# Patient Record
Sex: MALE | Race: WHITE | NOT HISPANIC OR LATINO | Employment: FULL TIME | URBAN - METROPOLITAN AREA
[De-identification: names, ages, dates, MRNs, and addresses within clinical notes are randomized per-mention and may not be internally consistent; named-entity substitution may affect disease eponyms.]

---

## 2017-01-04 ENCOUNTER — ALLSCRIPTS OFFICE VISIT (OUTPATIENT)
Dept: OTHER | Facility: OTHER | Age: 42
End: 2017-01-04

## 2017-01-09 ENCOUNTER — GENERIC CONVERSION - ENCOUNTER (OUTPATIENT)
Dept: OTHER | Facility: OTHER | Age: 42
End: 2017-01-09

## 2017-01-12 ENCOUNTER — LAB CONVERSION - ENCOUNTER (OUTPATIENT)
Dept: OTHER | Facility: OTHER | Age: 42
End: 2017-01-12

## 2017-01-12 LAB
A/G RATIO (HISTORICAL): 1.4 (CALC) (ref 1–2.5)
ALBUMIN SERPL BCP-MCNC: 4.2 G/DL (ref 3.6–5.1)
ALP SERPL-CCNC: 78 U/L (ref 40–115)
ALT SERPL W P-5'-P-CCNC: 20 U/L (ref 9–46)
AMYLASE (HISTORICAL): 29 U/L (ref 21–101)
AST SERPL W P-5'-P-CCNC: 25 U/L (ref 10–40)
BASOPHILS # BLD AUTO: 0.5 %
BASOPHILS # BLD AUTO: 28 CELLS/UL (ref 0–200)
BILIRUB SERPL-MCNC: 0.9 MG/DL (ref 0.2–1.2)
BUN SERPL-MCNC: 11 MG/DL (ref 7–25)
BUN/CREA RATIO (HISTORICAL): NORMAL (CALC) (ref 6–22)
CALCIUM SERPL-MCNC: 9.3 MG/DL (ref 8.6–10.3)
CHLORIDE SERPL-SCNC: 105 MMOL/L (ref 98–110)
CHOLEST SERPL-MCNC: 113 MG/DL (ref 125–200)
CHOLEST/HDLC SERPL: 3.1 (CALC)
CO2 SERPL-SCNC: 26 MMOL/L (ref 20–31)
CREAT SERPL-MCNC: 0.65 MG/DL (ref 0.6–1.35)
DEPRECATED RDW RBC AUTO: 15.2 % (ref 11–15)
EGFR AFRICAN AMERICAN (HISTORICAL): 138 ML/MIN/1.73M2
EGFR-AMERICAN CALC (HISTORICAL): 119 ML/MIN/1.73M2
EOSINOPHIL # BLD AUTO: 246 CELLS/UL (ref 15–500)
EOSINOPHIL # BLD AUTO: 4.4 %
GAMMA GLOBULIN (HISTORICAL): 3 G/DL (CALC) (ref 1.9–3.7)
GLUCOSE (HISTORICAL): 93 MG/DL (ref 65–99)
HCT VFR BLD AUTO: 40.9 % (ref 38.5–50)
HDLC SERPL-MCNC: 36 MG/DL
HGB BLD-MCNC: 13.4 G/DL (ref 13.2–17.1)
LDL CHOLESTEROL (HISTORICAL): 40 MG/DL (CALC)
LIPASE SERPL-CCNC: 29 U/L (ref 7–60)
LYMPHOCYTES # BLD AUTO: 2206 CELLS/UL (ref 850–3900)
LYMPHOCYTES # BLD AUTO: 39.4 %
MCH RBC QN AUTO: 29.5 PG (ref 27–33)
MCHC RBC AUTO-ENTMCNC: 32.9 G/DL (ref 32–36)
MCV RBC AUTO: 89.9 FL (ref 80–100)
MONOCYTES # BLD AUTO: 325 CELLS/UL (ref 200–950)
MONOCYTES (HISTORICAL): 5.8 %
NEUTROPHILS # BLD AUTO: 2794 CELLS/UL (ref 1500–7800)
NEUTROPHILS # BLD AUTO: 49.9 %
NON-HDL-CHOL (CHOL-HDL) (HISTORICAL): 77 MG/DL (CALC)
PLATELET # BLD AUTO: 188 THOUSAND/UL (ref 140–400)
PMV BLD AUTO: 8.8 FL (ref 7.5–11.5)
POTASSIUM SERPL-SCNC: 4.3 MMOL/L (ref 3.5–5.3)
RBC # BLD AUTO: 4.55 MILLION/UL (ref 4.2–5.8)
SODIUM SERPL-SCNC: 141 MMOL/L (ref 135–146)
TOTAL PROTEIN (HISTORICAL): 7.2 G/DL (ref 6.1–8.1)
TRIGL SERPL-MCNC: 186 MG/DL
WBC # BLD AUTO: 5.6 THOUSAND/UL (ref 3.8–10.8)

## 2017-01-17 ENCOUNTER — ALLSCRIPTS OFFICE VISIT (OUTPATIENT)
Dept: OTHER | Facility: OTHER | Age: 42
End: 2017-01-17

## 2017-01-24 ENCOUNTER — ALLSCRIPTS OFFICE VISIT (OUTPATIENT)
Dept: OTHER | Facility: OTHER | Age: 42
End: 2017-01-24

## 2017-01-31 ENCOUNTER — ALLSCRIPTS OFFICE VISIT (OUTPATIENT)
Dept: OTHER | Facility: OTHER | Age: 42
End: 2017-01-31

## 2017-01-31 ENCOUNTER — GENERIC CONVERSION - ENCOUNTER (OUTPATIENT)
Dept: OTHER | Facility: OTHER | Age: 42
End: 2017-01-31

## 2017-02-13 ENCOUNTER — ANESTHESIA EVENT (OUTPATIENT)
Dept: PERIOP | Facility: HOSPITAL | Age: 42
End: 2017-02-13
Payer: COMMERCIAL

## 2017-02-13 RX ORDER — OMEGA-3-ACID ETHYL ESTERS 1 G/1
2 CAPSULE, LIQUID FILLED ORAL 2 TIMES DAILY
COMMUNITY
End: 2018-04-06 | Stop reason: SDUPTHER

## 2017-02-13 RX ORDER — ASPIRIN 325 MG
325 TABLET ORAL DAILY
COMMUNITY
End: 2018-04-18

## 2017-02-13 RX ORDER — B-COMPLEX WITH VITAMIN C
500 TABLET ORAL
COMMUNITY
End: 2017-11-18 | Stop reason: HOSPADM

## 2017-02-13 RX ORDER — ALBUTEROL SULFATE 90 UG/1
1-2 AEROSOL, METERED RESPIRATORY (INHALATION) EVERY 4 HOURS PRN
COMMUNITY
End: 2018-07-16 | Stop reason: SDUPTHER

## 2017-02-13 RX ORDER — PANTOPRAZOLE SODIUM 40 MG/1
40 TABLET, DELAYED RELEASE ORAL DAILY
Status: ON HOLD | COMMUNITY
End: 2017-11-18

## 2017-02-13 RX ORDER — TRAZODONE HYDROCHLORIDE 50 MG/1
50 TABLET ORAL
COMMUNITY
End: 2018-05-01 | Stop reason: SDUPTHER

## 2017-02-13 RX ORDER — ROSUVASTATIN CALCIUM 20 MG/1
20 TABLET, COATED ORAL DAILY
COMMUNITY
End: 2018-03-14 | Stop reason: SDUPTHER

## 2017-02-14 ENCOUNTER — ANESTHESIA (OUTPATIENT)
Dept: PERIOP | Facility: HOSPITAL | Age: 42
End: 2017-02-14
Payer: COMMERCIAL

## 2017-02-14 ENCOUNTER — HOSPITAL ENCOUNTER (OUTPATIENT)
Facility: HOSPITAL | Age: 42
Setting detail: OUTPATIENT SURGERY
Discharge: HOME | End: 2017-02-14
Attending: SURGERY | Admitting: SURGERY
Payer: COMMERCIAL

## 2017-02-14 ENCOUNTER — APPOINTMENT (OUTPATIENT)
Dept: RADIOLOGY | Facility: HOSPITAL | Age: 42
End: 2017-02-14
Payer: COMMERCIAL

## 2017-02-14 VITALS
DIASTOLIC BLOOD PRESSURE: 86 MMHG | BODY MASS INDEX: 25.06 KG/M2 | HEIGHT: 71 IN | RESPIRATION RATE: 18 BRPM | HEART RATE: 83 BPM | SYSTOLIC BLOOD PRESSURE: 143 MMHG | WEIGHT: 179 LBS | OXYGEN SATURATION: 96 % | TEMPERATURE: 97.1 F

## 2017-02-14 DIAGNOSIS — K82.8 GALLBLADDER SLUDGE: ICD-10-CM

## 2017-02-14 PROBLEM — K85.10 GALLSTONE PANCREATITIS: Status: ACTIVE | Noted: 2017-02-14

## 2017-02-14 PROCEDURE — A9270 NON-COVERED ITEM OR SERVICE: HCPCS | Performed by: ANESTHESIOLOGY

## 2017-02-14 PROCEDURE — 88304 TISSUE EXAM BY PATHOLOGIST: CPT | Performed by: SURGERY

## 2017-02-14 PROCEDURE — A9270 NON-COVERED ITEM OR SERVICE: HCPCS | Performed by: NURSE ANESTHETIST, CERTIFIED REGISTERED

## 2017-02-14 PROCEDURE — 74300 X-RAY BILE DUCTS/PANCREAS: CPT

## 2017-02-14 RX ORDER — MEPERIDINE HYDROCHLORIDE 25 MG/ML
12.5 INJECTION INTRAMUSCULAR; INTRAVENOUS; SUBCUTANEOUS AS NEEDED
Status: DISCONTINUED | OUTPATIENT
Start: 2017-02-14 | End: 2017-02-14 | Stop reason: HOSPADM

## 2017-02-14 RX ORDER — FENTANYL CITRATE 50 UG/ML
INJECTION, SOLUTION INTRAMUSCULAR; INTRAVENOUS AS NEEDED
Status: DISCONTINUED | OUTPATIENT
Start: 2017-02-14 | End: 2017-02-14 | Stop reason: SURG

## 2017-02-14 RX ORDER — SODIUM CHLORIDE, SODIUM LACTATE, POTASSIUM CHLORIDE, CALCIUM CHLORIDE 600; 310; 30; 20 MG/100ML; MG/100ML; MG/100ML; MG/100ML
INJECTION, SOLUTION INTRAVENOUS CONTINUOUS PRN
Status: DISCONTINUED | OUTPATIENT
Start: 2017-02-14 | End: 2017-02-14 | Stop reason: SURG

## 2017-02-14 RX ORDER — ONDANSETRON 2 MG/ML
4 INJECTION INTRAMUSCULAR; INTRAVENOUS EVERY 6 HOURS PRN
Status: DISCONTINUED | OUTPATIENT
Start: 2017-02-14 | End: 2017-02-14 | Stop reason: HOSPADM

## 2017-02-14 RX ORDER — MORPHINE SULFATE 2 MG/ML
2 INJECTION, SOLUTION INTRAMUSCULAR; INTRAVENOUS EVERY 2 HOUR PRN
Status: DISCONTINUED | OUTPATIENT
Start: 2017-02-14 | End: 2017-02-14 | Stop reason: HOSPADM

## 2017-02-14 RX ORDER — GLYCOPYRROLATE 0.2 MG/ML
INJECTION INTRAMUSCULAR; INTRAVENOUS AS NEEDED
Status: DISCONTINUED | OUTPATIENT
Start: 2017-02-14 | End: 2017-02-14 | Stop reason: SURG

## 2017-02-14 RX ORDER — OXYCODONE HYDROCHLORIDE AND ACETAMINOPHEN 5; 325 MG/1; MG/1
1 TABLET ORAL EVERY 4 HOURS PRN
Status: DISCONTINUED | OUTPATIENT
Start: 2017-02-14 | End: 2017-02-14 | Stop reason: HOSPADM

## 2017-02-14 RX ORDER — LIDOCAINE HYDROCHLORIDE 10 MG/ML
INJECTION, SOLUTION INFILTRATION; PERINEURAL AS NEEDED
Status: DISCONTINUED | OUTPATIENT
Start: 2017-02-14 | End: 2017-02-14 | Stop reason: SURG

## 2017-02-14 RX ORDER — SODIUM CHLORIDE, SODIUM LACTATE, POTASSIUM CHLORIDE, CALCIUM CHLORIDE 600; 310; 30; 20 MG/100ML; MG/100ML; MG/100ML; MG/100ML
50 INJECTION, SOLUTION INTRAVENOUS CONTINUOUS
Status: DISCONTINUED | OUTPATIENT
Start: 2017-02-14 | End: 2017-02-14 | Stop reason: HOSPADM

## 2017-02-14 RX ORDER — LABETALOL HYDROCHLORIDE 5 MG/ML
INJECTION, SOLUTION INTRAVENOUS AS NEEDED
Status: DISCONTINUED | OUTPATIENT
Start: 2017-02-14 | End: 2017-02-14 | Stop reason: SURG

## 2017-02-14 RX ORDER — OXYCODONE HYDROCHLORIDE AND ACETAMINOPHEN 5; 325 MG/1; MG/1
1 TABLET ORAL EVERY 6 HOURS PRN
Qty: 20 TABLET | Refills: 0 | Status: SHIPPED | OUTPATIENT
Start: 2017-02-14 | End: 2017-02-24

## 2017-02-14 RX ORDER — ONDANSETRON 2 MG/ML
INJECTION INTRAMUSCULAR; INTRAVENOUS AS NEEDED
Status: DISCONTINUED | OUTPATIENT
Start: 2017-02-14 | End: 2017-02-14 | Stop reason: SURG

## 2017-02-14 RX ORDER — ROCURONIUM BROMIDE 10 MG/ML
INJECTION, SOLUTION INTRAVENOUS AS NEEDED
Status: DISCONTINUED | OUTPATIENT
Start: 2017-02-14 | End: 2017-02-14 | Stop reason: SURG

## 2017-02-14 RX ORDER — FENTANYL CITRATE/PF 50 MCG/ML
50 SYRINGE (ML) INJECTION
Status: DISCONTINUED | OUTPATIENT
Start: 2017-02-14 | End: 2017-02-14 | Stop reason: HOSPADM

## 2017-02-14 RX ORDER — MIDAZOLAM HYDROCHLORIDE 1 MG/ML
INJECTION INTRAMUSCULAR; INTRAVENOUS AS NEEDED
Status: DISCONTINUED | OUTPATIENT
Start: 2017-02-14 | End: 2017-02-14 | Stop reason: SURG

## 2017-02-14 RX ORDER — SODIUM CHLORIDE, SODIUM LACTATE, POTASSIUM CHLORIDE, CALCIUM CHLORIDE 600; 310; 30; 20 MG/100ML; MG/100ML; MG/100ML; MG/100ML
100 INJECTION, SOLUTION INTRAVENOUS
Status: COMPLETED | OUTPATIENT
Start: 2017-02-14 | End: 2017-02-14

## 2017-02-14 RX ORDER — PROPOFOL 10 MG/ML
INJECTION, EMULSION INTRAVENOUS AS NEEDED
Status: DISCONTINUED | OUTPATIENT
Start: 2017-02-14 | End: 2017-02-14 | Stop reason: SURG

## 2017-02-14 RX ORDER — KETOROLAC TROMETHAMINE 30 MG/ML
INJECTION, SOLUTION INTRAMUSCULAR; INTRAVENOUS AS NEEDED
Status: DISCONTINUED | OUTPATIENT
Start: 2017-02-14 | End: 2017-02-14 | Stop reason: SURG

## 2017-02-14 RX ORDER — ACETAMINOPHEN 325 MG/1
650 TABLET ORAL ONCE
Status: DISCONTINUED | OUTPATIENT
Start: 2017-02-14 | End: 2017-02-14 | Stop reason: HOSPADM

## 2017-02-14 RX ADMIN — HYDROMORPHONE HYDROCHLORIDE 0.5 MG: 1 INJECTION, SOLUTION INTRAMUSCULAR; INTRAVENOUS; SUBCUTANEOUS at 10:54

## 2017-02-14 RX ADMIN — SODIUM CHLORIDE, SODIUM LACTATE, POTASSIUM CHLORIDE, AND CALCIUM CHLORIDE 50 ML/HR: .6; .31; .03; .02 INJECTION, SOLUTION INTRAVENOUS at 10:58

## 2017-02-14 RX ADMIN — FENTANYL CITRATE 100 MCG: 50 INJECTION, SOLUTION INTRAMUSCULAR; INTRAVENOUS at 09:14

## 2017-02-14 RX ADMIN — HYDROMORPHONE HYDROCHLORIDE 0.5 MG: 1 INJECTION, SOLUTION INTRAMUSCULAR; INTRAVENOUS; SUBCUTANEOUS at 10:46

## 2017-02-14 RX ADMIN — ROCURONIUM BROMIDE 30 MG: 10 INJECTION, SOLUTION INTRAVENOUS at 09:16

## 2017-02-14 RX ADMIN — HYDROMORPHONE HYDROCHLORIDE 0.5 MG: 1 INJECTION, SOLUTION INTRAMUSCULAR; INTRAVENOUS; SUBCUTANEOUS at 11:01

## 2017-02-14 RX ADMIN — PROPOFOL 100 MG: 10 INJECTION, EMULSION INTRAVENOUS at 09:16

## 2017-02-14 RX ADMIN — ROCURONIUM BROMIDE 10 MG: 10 INJECTION, SOLUTION INTRAVENOUS at 09:30

## 2017-02-14 RX ADMIN — FENTANYL CITRATE 50 MCG: 50 INJECTION, SOLUTION INTRAMUSCULAR; INTRAVENOUS at 10:29

## 2017-02-14 RX ADMIN — SODIUM CHLORIDE, SODIUM LACTATE, POTASSIUM CHLORIDE, AND CALCIUM CHLORIDE 100 ML/HR: .6; .31; .03; .02 INJECTION, SOLUTION INTRAVENOUS at 09:00

## 2017-02-14 RX ADMIN — ROCURONIUM BROMIDE 10 MG: 10 INJECTION, SOLUTION INTRAVENOUS at 09:53

## 2017-02-14 RX ADMIN — ENOXAPARIN SODIUM 40 MG: 40 INJECTION SUBCUTANEOUS at 09:00

## 2017-02-14 RX ADMIN — MIDAZOLAM HYDROCHLORIDE 2 MG: 1 INJECTION, SOLUTION INTRAMUSCULAR; INTRAVENOUS at 09:05

## 2017-02-14 RX ADMIN — KETOROLAC TROMETHAMINE 30 MG: 30 INJECTION, SOLUTION INTRAMUSCULAR at 09:55

## 2017-02-14 RX ADMIN — CEFAZOLIN SODIUM 2000 MG: 2 SOLUTION INTRAVENOUS at 09:19

## 2017-02-14 RX ADMIN — SODIUM CHLORIDE, SODIUM LACTATE, POTASSIUM CHLORIDE, AND CALCIUM CHLORIDE: .6; .31; .03; .02 INJECTION, SOLUTION INTRAVENOUS at 09:06

## 2017-02-14 RX ADMIN — HYDROMORPHONE HYDROCHLORIDE 1 MG: 1 INJECTION, SOLUTION INTRAMUSCULAR; INTRAVENOUS; SUBCUTANEOUS at 11:20

## 2017-02-14 RX ADMIN — NEOSTIGMINE METHYLSULFATE 4 MG: 1 INJECTION INTRAMUSCULAR; INTRAVENOUS; SUBCUTANEOUS at 10:03

## 2017-02-14 RX ADMIN — FENTANYL CITRATE 50 MCG: 50 INJECTION, SOLUTION INTRAMUSCULAR; INTRAVENOUS at 10:35

## 2017-02-14 RX ADMIN — LIDOCAINE HYDROCHLORIDE 100 MG: 10 INJECTION, SOLUTION INFILTRATION; PERINEURAL at 09:15

## 2017-02-14 RX ADMIN — HYDROMORPHONE HYDROCHLORIDE 0.5 MG: 1 INJECTION, SOLUTION INTRAMUSCULAR; INTRAVENOUS; SUBCUTANEOUS at 10:41

## 2017-02-14 RX ADMIN — FENTANYL CITRATE 100 MCG: 50 INJECTION, SOLUTION INTRAMUSCULAR; INTRAVENOUS at 09:31

## 2017-02-14 RX ADMIN — LABETALOL HYDROCHLORIDE 5 MG: 5 INJECTION, SOLUTION INTRAVENOUS at 09:40

## 2017-02-14 RX ADMIN — HYDROMORPHONE HYDROCHLORIDE 1 MG: 1 INJECTION, SOLUTION INTRAMUSCULAR; INTRAVENOUS; SUBCUTANEOUS at 11:38

## 2017-02-14 RX ADMIN — PROPOFOL 200 MG: 10 INJECTION, EMULSION INTRAVENOUS at 09:15

## 2017-02-14 RX ADMIN — PROPOFOL 100 MG: 10 INJECTION, EMULSION INTRAVENOUS at 09:17

## 2017-02-14 RX ADMIN — DEXAMETHASONE SODIUM PHOSPHATE 10 MG: 10 INJECTION INTRAMUSCULAR; INTRAVENOUS at 09:55

## 2017-02-14 RX ADMIN — ONDANSETRON 4 MG: 2 INJECTION INTRAMUSCULAR; INTRAVENOUS at 09:55

## 2017-02-14 RX ADMIN — GLYCOPYRROLATE 0.8 MG: 0.2 INJECTION, SOLUTION INTRAMUSCULAR; INTRAVENOUS at 10:03

## 2017-03-02 ENCOUNTER — APPOINTMENT (EMERGENCY)
Dept: CT IMAGING | Facility: HOSPITAL | Age: 42
End: 2017-03-02
Payer: COMMERCIAL

## 2017-03-02 ENCOUNTER — HOSPITAL ENCOUNTER (EMERGENCY)
Facility: HOSPITAL | Age: 42
End: 2017-03-02
Attending: EMERGENCY MEDICINE | Admitting: EMERGENCY MEDICINE
Payer: COMMERCIAL

## 2017-03-02 ENCOUNTER — HOSPITAL ENCOUNTER (INPATIENT)
Facility: HOSPITAL | Age: 42
LOS: 8 days | Discharge: HOME | DRG: 698 | End: 2017-03-10
Attending: INTERNAL MEDICINE | Admitting: HOSPITALIST
Payer: COMMERCIAL

## 2017-03-02 VITALS
TEMPERATURE: 97.9 F | OXYGEN SATURATION: 96 % | BODY MASS INDEX: 26.13 KG/M2 | HEART RATE: 50 BPM | RESPIRATION RATE: 18 BRPM | HEIGHT: 71 IN | SYSTOLIC BLOOD PRESSURE: 136 MMHG | WEIGHT: 186.62 LBS | DIASTOLIC BLOOD PRESSURE: 73 MMHG

## 2017-03-02 DIAGNOSIS — N28.89 RIGHT RENAL MASS: ICD-10-CM

## 2017-03-02 DIAGNOSIS — K85.90 PANCREATITIS: ICD-10-CM

## 2017-03-02 DIAGNOSIS — N28.89 KIDNEY MASS: Primary | ICD-10-CM

## 2017-03-02 DIAGNOSIS — K85.90 PANCREATITIS: Primary | ICD-10-CM

## 2017-03-02 PROBLEM — F17.200 SMOKER: Status: ACTIVE | Noted: 2017-03-02

## 2017-03-02 LAB
ALBUMIN SERPL BCP-MCNC: 3.7 G/DL (ref 3.5–5)
ALP SERPL-CCNC: 66 U/L (ref 46–116)
ALT SERPL W P-5'-P-CCNC: 25 U/L (ref 12–78)
ANION GAP SERPL CALCULATED.3IONS-SCNC: 9 MMOL/L (ref 4–13)
AST SERPL W P-5'-P-CCNC: 16 U/L (ref 5–45)
BASOPHILS # BLD AUTO: 0.05 THOUSANDS/ΜL (ref 0–0.1)
BASOPHILS NFR BLD AUTO: 1 % (ref 0–1)
BILIRUB DIRECT SERPL-MCNC: 0.06 MG/DL (ref 0–0.2)
BILIRUB SERPL-MCNC: 0.2 MG/DL (ref 0.2–1)
BILIRUB UR QL STRIP: NEGATIVE
BUN SERPL-MCNC: 11 MG/DL (ref 5–25)
CALCIUM SERPL-MCNC: 9.3 MG/DL (ref 8.3–10.1)
CHLORIDE SERPL-SCNC: 106 MMOL/L (ref 100–108)
CLARITY UR: CLEAR
CO2 SERPL-SCNC: 28 MMOL/L (ref 21–32)
COLOR UR: YELLOW
CREAT SERPL-MCNC: 0.76 MG/DL (ref 0.6–1.3)
EOSINOPHIL # BLD AUTO: 0.25 THOUSAND/ΜL (ref 0–0.61)
EOSINOPHIL NFR BLD AUTO: 3 % (ref 0–6)
ERYTHROCYTE [DISTWIDTH] IN BLOOD BY AUTOMATED COUNT: 14.2 % (ref 11.6–15.1)
GFR SERPL CREATININE-BSD FRML MDRD: >60 ML/MIN/1.73SQ M
GLUCOSE SERPL-MCNC: 104 MG/DL (ref 65–140)
GLUCOSE UR STRIP-MCNC: NEGATIVE MG/DL
HCT VFR BLD AUTO: 42.9 % (ref 36.5–49.3)
HGB BLD-MCNC: 14.1 G/DL (ref 12–17)
HGB UR QL STRIP.AUTO: NEGATIVE
KETONES UR STRIP-MCNC: NEGATIVE MG/DL
LACTATE SERPL-SCNC: 0.9 MMOL/L (ref 0.5–2)
LEUKOCYTE ESTERASE UR QL STRIP: NEGATIVE
LIPASE SERPL-CCNC: 167 U/L (ref 73–393)
LYMPHOCYTES # BLD AUTO: 2.37 THOUSANDS/ΜL (ref 0.6–4.47)
LYMPHOCYTES NFR BLD AUTO: 31 % (ref 14–44)
MCH RBC QN AUTO: 30.1 PG (ref 26.8–34.3)
MCHC RBC AUTO-ENTMCNC: 32.9 G/DL (ref 31.4–37.4)
MCV RBC AUTO: 92 FL (ref 82–98)
MONOCYTES # BLD AUTO: 0.47 THOUSAND/ΜL (ref 0.17–1.22)
MONOCYTES NFR BLD AUTO: 6 % (ref 4–12)
NEUTROPHILS # BLD AUTO: 4.44 THOUSANDS/ΜL (ref 1.85–7.62)
NEUTS SEG NFR BLD AUTO: 58 % (ref 43–75)
NITRITE UR QL STRIP: NEGATIVE
NRBC BLD AUTO-RTO: 0 /100 WBCS
PH UR STRIP.AUTO: 7 [PH] (ref 4.5–8)
PLATELET # BLD AUTO: 258 THOUSANDS/UL (ref 149–390)
PMV BLD AUTO: 9.6 FL (ref 8.9–12.7)
POTASSIUM SERPL-SCNC: 3.6 MMOL/L (ref 3.5–5.3)
PROT SERPL-MCNC: 7.2 G/DL (ref 6.4–8.2)
PROT UR STRIP-MCNC: NEGATIVE MG/DL
RBC # BLD AUTO: 4.69 MILLION/UL (ref 3.88–5.62)
SODIUM SERPL-SCNC: 143 MMOL/L (ref 136–145)
SP GR UR STRIP.AUTO: 1.01 (ref 1–1.03)
TROPONIN I SERPL-MCNC: <0.02 NG/ML
UROBILINOGEN UR QL STRIP.AUTO: 0.2 E.U./DL
WBC # BLD AUTO: 7.63 THOUSAND/UL (ref 4.31–10.16)

## 2017-03-02 PROCEDURE — C9113 INJ PANTOPRAZOLE SODIUM, VIA: HCPCS | Performed by: HOSPITALIST

## 2017-03-02 PROCEDURE — 96375 TX/PRO/DX INJ NEW DRUG ADDON: CPT

## 2017-03-02 PROCEDURE — 93005 ELECTROCARDIOGRAM TRACING: CPT | Performed by: EMERGENCY MEDICINE

## 2017-03-02 PROCEDURE — A9270 NON-COVERED ITEM OR SERVICE: HCPCS | Performed by: HOSPITALIST

## 2017-03-02 PROCEDURE — 74177 CT ABD & PELVIS W/CONTRAST: CPT

## 2017-03-02 PROCEDURE — 85025 COMPLETE CBC W/AUTO DIFF WBC: CPT | Performed by: EMERGENCY MEDICINE

## 2017-03-02 PROCEDURE — 99285 EMERGENCY DEPT VISIT HI MDM: CPT

## 2017-03-02 PROCEDURE — 83690 ASSAY OF LIPASE: CPT | Performed by: EMERGENCY MEDICINE

## 2017-03-02 PROCEDURE — A9270 NON-COVERED ITEM OR SERVICE: HCPCS

## 2017-03-02 PROCEDURE — 96374 THER/PROPH/DIAG INJ IV PUSH: CPT

## 2017-03-02 PROCEDURE — A9270 NON-COVERED ITEM OR SERVICE: HCPCS | Performed by: EMERGENCY MEDICINE

## 2017-03-02 PROCEDURE — 87040 BLOOD CULTURE FOR BACTERIA: CPT | Performed by: EMERGENCY MEDICINE

## 2017-03-02 PROCEDURE — 84484 ASSAY OF TROPONIN QUANT: CPT | Performed by: EMERGENCY MEDICINE

## 2017-03-02 PROCEDURE — 81003 URINALYSIS AUTO W/O SCOPE: CPT | Performed by: EMERGENCY MEDICINE

## 2017-03-02 PROCEDURE — 80048 BASIC METABOLIC PNL TOTAL CA: CPT | Performed by: EMERGENCY MEDICINE

## 2017-03-02 PROCEDURE — A9270 NON-COVERED ITEM OR SERVICE: HCPCS | Performed by: PHYSICIAN ASSISTANT

## 2017-03-02 PROCEDURE — 80076 HEPATIC FUNCTION PANEL: CPT | Performed by: EMERGENCY MEDICINE

## 2017-03-02 PROCEDURE — 96376 TX/PRO/DX INJ SAME DRUG ADON: CPT

## 2017-03-02 PROCEDURE — 36415 COLL VENOUS BLD VENIPUNCTURE: CPT | Performed by: EMERGENCY MEDICINE

## 2017-03-02 PROCEDURE — 83605 ASSAY OF LACTIC ACID: CPT | Performed by: EMERGENCY MEDICINE

## 2017-03-02 RX ORDER — ALBUTEROL SULFATE 90 UG/1
1 AEROSOL, METERED RESPIRATORY (INHALATION) EVERY 4 HOURS PRN
Status: DISCONTINUED | OUTPATIENT
Start: 2017-03-02 | End: 2017-03-10 | Stop reason: HOSPADM

## 2017-03-02 RX ORDER — OXYCODONE HYDROCHLORIDE 5 MG/1
5 TABLET ORAL EVERY 4 HOURS PRN
Status: DISCONTINUED | OUTPATIENT
Start: 2017-03-02 | End: 2017-03-10

## 2017-03-02 RX ORDER — ONDANSETRON 2 MG/ML
4 INJECTION INTRAMUSCULAR; INTRAVENOUS ONCE
Status: COMPLETED | OUTPATIENT
Start: 2017-03-02 | End: 2017-03-02

## 2017-03-02 RX ORDER — SODIUM CHLORIDE 9 MG/ML
100 INJECTION, SOLUTION INTRAVENOUS CONTINUOUS
Status: DISPENSED | OUTPATIENT
Start: 2017-03-02 | End: 2017-03-03

## 2017-03-02 RX ORDER — DOCUSATE SODIUM 100 MG/1
100 CAPSULE, LIQUID FILLED ORAL 2 TIMES DAILY
Status: DISCONTINUED | OUTPATIENT
Start: 2017-03-02 | End: 2017-03-10 | Stop reason: HOSPADM

## 2017-03-02 RX ORDER — ONDANSETRON 2 MG/ML
4 INJECTION INTRAMUSCULAR; INTRAVENOUS EVERY 6 HOURS PRN
Status: DISCONTINUED | OUTPATIENT
Start: 2017-03-02 | End: 2017-03-10 | Stop reason: HOSPADM

## 2017-03-02 RX ORDER — CHLORAL HYDRATE 500 MG
1000 CAPSULE ORAL DAILY
Status: DISCONTINUED | OUTPATIENT
Start: 2017-03-03 | End: 2017-03-10 | Stop reason: HOSPADM

## 2017-03-02 RX ORDER — FENOFIBRATE 48 MG/1
48 TABLET, COATED ORAL DAILY
Status: DISCONTINUED | OUTPATIENT
Start: 2017-03-03 | End: 2017-03-10 | Stop reason: HOSPADM

## 2017-03-02 RX ORDER — MORPHINE SULFATE 2 MG/ML
2 INJECTION, SOLUTION INTRAMUSCULAR; INTRAVENOUS EVERY 4 HOURS PRN
Status: DISCONTINUED | OUTPATIENT
Start: 2017-03-02 | End: 2017-03-03

## 2017-03-02 RX ORDER — ACETAMINOPHEN 325 MG/1
650 TABLET ORAL EVERY 6 HOURS PRN
Status: DISCONTINUED | OUTPATIENT
Start: 2017-03-02 | End: 2017-03-10 | Stop reason: HOSPADM

## 2017-03-02 RX ORDER — ASPIRIN 325 MG
325 TABLET ORAL DAILY
Status: DISCONTINUED | OUTPATIENT
Start: 2017-03-03 | End: 2017-03-10 | Stop reason: HOSPADM

## 2017-03-02 RX ORDER — PANTOPRAZOLE SODIUM 40 MG/1
40 INJECTION, POWDER, FOR SOLUTION INTRAVENOUS EVERY 12 HOURS SCHEDULED
Status: DISCONTINUED | OUTPATIENT
Start: 2017-03-02 | End: 2017-03-05

## 2017-03-02 RX ORDER — NICOTINE 21 MG/24HR
1 PATCH, TRANSDERMAL 24 HOURS TRANSDERMAL DAILY
Status: DISCONTINUED | OUTPATIENT
Start: 2017-03-03 | End: 2017-03-10 | Stop reason: HOSPADM

## 2017-03-02 RX ORDER — TRAZODONE HYDROCHLORIDE 50 MG/1
50 TABLET ORAL
Status: DISCONTINUED | OUTPATIENT
Start: 2017-03-02 | End: 2017-03-10 | Stop reason: HOSPADM

## 2017-03-02 RX ORDER — ATORVASTATIN CALCIUM 40 MG/1
40 TABLET, FILM COATED ORAL
Status: DISCONTINUED | OUTPATIENT
Start: 2017-03-03 | End: 2017-03-07

## 2017-03-02 RX ORDER — SENNOSIDES 8.6 MG
1 TABLET ORAL DAILY
Status: DISCONTINUED | OUTPATIENT
Start: 2017-03-03 | End: 2017-03-07

## 2017-03-02 RX ORDER — NIACIN 100 MG
50 TABLET ORAL
Status: DISCONTINUED | OUTPATIENT
Start: 2017-03-03 | End: 2017-03-10 | Stop reason: HOSPADM

## 2017-03-02 RX ADMIN — PANCRELIPASE 24000 UNITS: 24000; 76000; 120000 CAPSULE, DELAYED RELEASE PELLETS ORAL at 22:24

## 2017-03-02 RX ADMIN — OXYCODONE HYDROCHLORIDE 5 MG: 5 TABLET ORAL at 22:53

## 2017-03-02 RX ADMIN — TRAZODONE HYDROCHLORIDE 50 MG: 50 TABLET ORAL at 22:24

## 2017-03-02 RX ADMIN — HYDROMORPHONE HYDROCHLORIDE 0.5 MG: 1 INJECTION, SOLUTION INTRAMUSCULAR; INTRAVENOUS; SUBCUTANEOUS at 17:42

## 2017-03-02 RX ADMIN — DOCUSATE SODIUM 100 MG: 100 CAPSULE, LIQUID FILLED ORAL at 22:25

## 2017-03-02 RX ADMIN — PANTOPRAZOLE SODIUM 40 MG: 40 INJECTION, POWDER, FOR SOLUTION INTRAVENOUS at 22:25

## 2017-03-02 RX ADMIN — SODIUM CHLORIDE 100 ML/HR: 0.9 INJECTION, SOLUTION INTRAVENOUS at 22:51

## 2017-03-02 RX ADMIN — IOHEXOL 100 ML: 350 INJECTION, SOLUTION INTRAVENOUS at 14:39

## 2017-03-02 RX ADMIN — ONDANSETRON 4 MG: 2 INJECTION INTRAMUSCULAR; INTRAVENOUS at 15:47

## 2017-03-02 RX ADMIN — Medication 0.5 MG: at 17:42

## 2017-03-02 RX ADMIN — HYDROMORPHONE HYDROCHLORIDE 0.5 MG: 1 INJECTION, SOLUTION INTRAMUSCULAR; INTRAVENOUS; SUBCUTANEOUS at 20:08

## 2017-03-02 RX ADMIN — HYDROMORPHONE HYDROCHLORIDE 0.5 MG: 1 INJECTION, SOLUTION INTRAMUSCULAR; INTRAVENOUS; SUBCUTANEOUS at 15:46

## 2017-03-03 LAB
ATRIAL RATE: 52 BPM
P AXIS: 57 DEGREES
PR INTERVAL: 142 MS
QRS AXIS: 69 DEGREES
QRSD INTERVAL: 116 MS
QT INTERVAL: 414 MS
QTC INTERVAL: 385 MS
T WAVE AXIS: 63 DEGREES
VENTRICULAR RATE: 52 BPM

## 2017-03-03 PROCEDURE — A9270 NON-COVERED ITEM OR SERVICE: HCPCS | Performed by: HOSPITALIST

## 2017-03-03 PROCEDURE — C9113 INJ PANTOPRAZOLE SODIUM, VIA: HCPCS | Performed by: HOSPITALIST

## 2017-03-03 PROCEDURE — A9270 NON-COVERED ITEM OR SERVICE: HCPCS | Performed by: PHYSICIAN ASSISTANT

## 2017-03-03 PROCEDURE — A9270 NON-COVERED ITEM OR SERVICE: HCPCS | Performed by: INTERNAL MEDICINE

## 2017-03-03 RX ORDER — DEXTROSE AND SODIUM CHLORIDE 5; .9 G/100ML; G/100ML
100 INJECTION, SOLUTION INTRAVENOUS CONTINUOUS
Status: DISCONTINUED | OUTPATIENT
Start: 2017-03-03 | End: 2017-03-03

## 2017-03-03 RX ORDER — SODIUM CHLORIDE 9 MG/ML
75 INJECTION, SOLUTION INTRAVENOUS CONTINUOUS
Status: DISCONTINUED | OUTPATIENT
Start: 2017-03-03 | End: 2017-03-10 | Stop reason: HOSPADM

## 2017-03-03 RX ADMIN — SODIUM CHLORIDE 150 ML/HR: 0.9 INJECTION, SOLUTION INTRAVENOUS at 10:42

## 2017-03-03 RX ADMIN — HYDROMORPHONE HYDROCHLORIDE 1 MG: 1 INJECTION, SOLUTION INTRAMUSCULAR; INTRAVENOUS; SUBCUTANEOUS at 21:59

## 2017-03-03 RX ADMIN — SODIUM CHLORIDE 150 ML/HR: 0.9 INJECTION, SOLUTION INTRAVENOUS at 17:51

## 2017-03-03 RX ADMIN — PANTOPRAZOLE SODIUM 40 MG: 40 INJECTION, POWDER, FOR SOLUTION INTRAVENOUS at 22:00

## 2017-03-03 RX ADMIN — DOCUSATE SODIUM 100 MG: 100 CAPSULE, LIQUID FILLED ORAL at 17:44

## 2017-03-03 RX ADMIN — Medication 50 MG: at 08:25

## 2017-03-03 RX ADMIN — OXYCODONE HYDROCHLORIDE 5 MG: 5 TABLET ORAL at 12:49

## 2017-03-03 RX ADMIN — ENOXAPARIN SODIUM 40 MG: 40 INJECTION SUBCUTANEOUS at 08:23

## 2017-03-03 RX ADMIN — OXYCODONE HYDROCHLORIDE 5 MG: 5 TABLET ORAL at 08:23

## 2017-03-03 RX ADMIN — DEXTROSE AND SODIUM CHLORIDE 100 ML/HR: 5; .9 INJECTION, SOLUTION INTRAVENOUS at 08:53

## 2017-03-03 RX ADMIN — TRAZODONE HYDROCHLORIDE 50 MG: 50 TABLET ORAL at 21:59

## 2017-03-03 RX ADMIN — OXYCODONE HYDROCHLORIDE 5 MG: 5 TABLET ORAL at 02:32

## 2017-03-03 RX ADMIN — Medication 1000 MG: at 08:24

## 2017-03-03 RX ADMIN — PANCRELIPASE 24000 UNITS: 24000; 76000; 120000 CAPSULE, DELAYED RELEASE PELLETS ORAL at 17:43

## 2017-03-03 RX ADMIN — ASPIRIN 325 MG: 325 TABLET ORAL at 08:24

## 2017-03-03 RX ADMIN — FENOFIBRATE 48 MG: 48 TABLET ORAL at 08:24

## 2017-03-03 RX ADMIN — HYDROMORPHONE HYDROCHLORIDE 1 MG: 1 INJECTION, SOLUTION INTRAMUSCULAR; INTRAVENOUS; SUBCUTANEOUS at 17:40

## 2017-03-03 RX ADMIN — PANTOPRAZOLE SODIUM 40 MG: 40 INJECTION, POWDER, FOR SOLUTION INTRAVENOUS at 08:22

## 2017-03-03 RX ADMIN — HYDROMORPHONE HYDROCHLORIDE 1 MG: 1 INJECTION, SOLUTION INTRAMUSCULAR; INTRAVENOUS; SUBCUTANEOUS at 08:53

## 2017-03-03 RX ADMIN — HYDROMORPHONE HYDROCHLORIDE 1 MG: 1 INJECTION, SOLUTION INTRAMUSCULAR; INTRAVENOUS; SUBCUTANEOUS at 14:30

## 2017-03-04 LAB
ALBUMIN SERPL BCP-MCNC: 3.2 G/DL (ref 3.5–5)
ALP SERPL-CCNC: 47 U/L (ref 46–116)
ALT SERPL W P-5'-P-CCNC: 22 U/L (ref 12–78)
ANION GAP SERPL CALCULATED.3IONS-SCNC: 6 MMOL/L (ref 4–13)
AST SERPL W P-5'-P-CCNC: 13 U/L (ref 5–45)
BILIRUB SERPL-MCNC: 0.3 MG/DL (ref 0.2–1)
BUN SERPL-MCNC: 8 MG/DL (ref 5–25)
CALCIUM SERPL-MCNC: 8.6 MG/DL (ref 8.3–10.1)
CHLORIDE SERPL-SCNC: 111 MMOL/L (ref 100–108)
CO2 SERPL-SCNC: 27 MMOL/L (ref 21–32)
CREAT SERPL-MCNC: 0.58 MG/DL (ref 0.6–1.3)
ERYTHROCYTE [DISTWIDTH] IN BLOOD BY AUTOMATED COUNT: 14.4 % (ref 11.6–15.1)
GFR SERPL CREATININE-BSD FRML MDRD: >60 ML/MIN/1.73SQ M
GLUCOSE SERPL-MCNC: 80 MG/DL (ref 65–140)
HCT VFR BLD AUTO: 39.6 % (ref 36.5–49.3)
HGB BLD-MCNC: 13.2 G/DL (ref 12–17)
LIPASE SERPL-CCNC: 87 U/L (ref 73–393)
MAGNESIUM SERPL-MCNC: 2 MG/DL (ref 1.6–2.6)
MCH RBC QN AUTO: 30.8 PG (ref 26.8–34.3)
MCHC RBC AUTO-ENTMCNC: 33.3 G/DL (ref 31.4–37.4)
MCV RBC AUTO: 93 FL (ref 82–98)
PLATELET # BLD AUTO: 189 THOUSANDS/UL (ref 149–390)
PMV BLD AUTO: 10 FL (ref 8.9–12.7)
POTASSIUM SERPL-SCNC: 3.7 MMOL/L (ref 3.5–5.3)
PROT SERPL-MCNC: 6.2 G/DL (ref 6.4–8.2)
RBC # BLD AUTO: 4.28 MILLION/UL (ref 3.88–5.62)
SODIUM SERPL-SCNC: 144 MMOL/L (ref 136–145)
WBC # BLD AUTO: 5.46 THOUSAND/UL (ref 4.31–10.16)

## 2017-03-04 PROCEDURE — A9270 NON-COVERED ITEM OR SERVICE: HCPCS | Performed by: HOSPITALIST

## 2017-03-04 PROCEDURE — C9113 INJ PANTOPRAZOLE SODIUM, VIA: HCPCS | Performed by: HOSPITALIST

## 2017-03-04 PROCEDURE — A9270 NON-COVERED ITEM OR SERVICE: HCPCS | Performed by: INTERNAL MEDICINE

## 2017-03-04 PROCEDURE — 83735 ASSAY OF MAGNESIUM: CPT | Performed by: INTERNAL MEDICINE

## 2017-03-04 PROCEDURE — 85027 COMPLETE CBC AUTOMATED: CPT | Performed by: INTERNAL MEDICINE

## 2017-03-04 PROCEDURE — A9270 NON-COVERED ITEM OR SERVICE: HCPCS | Performed by: PHYSICIAN ASSISTANT

## 2017-03-04 PROCEDURE — 80053 COMPREHEN METABOLIC PANEL: CPT | Performed by: INTERNAL MEDICINE

## 2017-03-04 PROCEDURE — 83690 ASSAY OF LIPASE: CPT | Performed by: INTERNAL MEDICINE

## 2017-03-04 RX ADMIN — OXYCODONE HYDROCHLORIDE 5 MG: 5 TABLET ORAL at 12:21

## 2017-03-04 RX ADMIN — HYDROMORPHONE HYDROCHLORIDE 1 MG: 1 INJECTION, SOLUTION INTRAMUSCULAR; INTRAVENOUS; SUBCUTANEOUS at 06:36

## 2017-03-04 RX ADMIN — ASPIRIN 325 MG: 325 TABLET ORAL at 09:59

## 2017-03-04 RX ADMIN — PANCRELIPASE 24000 UNITS: 24000; 76000; 120000 CAPSULE, DELAYED RELEASE PELLETS ORAL at 09:59

## 2017-03-04 RX ADMIN — HYDROMORPHONE HYDROCHLORIDE 1 MG: 1 INJECTION, SOLUTION INTRAMUSCULAR; INTRAVENOUS; SUBCUTANEOUS at 09:54

## 2017-03-04 RX ADMIN — NICOTINE 1 PATCH: 14 PATCH, EXTENDED RELEASE TRANSDERMAL at 09:58

## 2017-03-04 RX ADMIN — SODIUM CHLORIDE 150 ML/HR: 0.9 INJECTION, SOLUTION INTRAVENOUS at 13:41

## 2017-03-04 RX ADMIN — DOCUSATE SODIUM 100 MG: 100 CAPSULE, LIQUID FILLED ORAL at 17:20

## 2017-03-04 RX ADMIN — PANCRELIPASE 24000 UNITS: 24000; 76000; 120000 CAPSULE, DELAYED RELEASE PELLETS ORAL at 17:20

## 2017-03-04 RX ADMIN — Medication 1000 MG: at 09:59

## 2017-03-04 RX ADMIN — Medication 50 MG: at 10:04

## 2017-03-04 RX ADMIN — SODIUM CHLORIDE 150 ML/HR: 0.9 INJECTION, SOLUTION INTRAVENOUS at 07:20

## 2017-03-04 RX ADMIN — FENOFIBRATE 48 MG: 48 TABLET ORAL at 09:59

## 2017-03-04 RX ADMIN — HYDROMORPHONE HYDROCHLORIDE 1 MG: 1 INJECTION, SOLUTION INTRAMUSCULAR; INTRAVENOUS; SUBCUTANEOUS at 17:18

## 2017-03-04 RX ADMIN — ENOXAPARIN SODIUM 40 MG: 40 INJECTION SUBCUTANEOUS at 09:59

## 2017-03-04 RX ADMIN — OXYCODONE HYDROCHLORIDE 5 MG: 5 TABLET ORAL at 18:00

## 2017-03-04 RX ADMIN — PANTOPRAZOLE SODIUM 40 MG: 40 INJECTION, POWDER, FOR SOLUTION INTRAVENOUS at 21:41

## 2017-03-04 RX ADMIN — ONDANSETRON 4 MG: 2 INJECTION INTRAMUSCULAR; INTRAVENOUS at 09:53

## 2017-03-04 RX ADMIN — DOCUSATE SODIUM 100 MG: 100 CAPSULE, LIQUID FILLED ORAL at 09:59

## 2017-03-04 RX ADMIN — HYDROMORPHONE HYDROCHLORIDE 1 MG: 1 INJECTION, SOLUTION INTRAMUSCULAR; INTRAVENOUS; SUBCUTANEOUS at 13:43

## 2017-03-04 RX ADMIN — PANCRELIPASE 24000 UNITS: 24000; 76000; 120000 CAPSULE, DELAYED RELEASE PELLETS ORAL at 12:21

## 2017-03-04 RX ADMIN — TRAZODONE HYDROCHLORIDE 50 MG: 50 TABLET ORAL at 21:41

## 2017-03-04 RX ADMIN — HYDROMORPHONE HYDROCHLORIDE 1 MG: 1 INJECTION, SOLUTION INTRAMUSCULAR; INTRAVENOUS; SUBCUTANEOUS at 03:23

## 2017-03-04 RX ADMIN — HYDROMORPHONE HYDROCHLORIDE 1 MG: 1 INJECTION, SOLUTION INTRAMUSCULAR; INTRAVENOUS; SUBCUTANEOUS at 20:37

## 2017-03-04 RX ADMIN — SENNOSIDES 8.6 MG: 8.6 TABLET, FILM COATED ORAL at 09:59

## 2017-03-04 RX ADMIN — SODIUM CHLORIDE 150 ML/HR: 0.9 INJECTION, SOLUTION INTRAVENOUS at 20:34

## 2017-03-04 RX ADMIN — PANTOPRAZOLE SODIUM 40 MG: 40 INJECTION, POWDER, FOR SOLUTION INTRAVENOUS at 09:59

## 2017-03-05 LAB
ALBUMIN SERPL BCP-MCNC: 3.4 G/DL (ref 3.5–5)
ALP SERPL-CCNC: 55 U/L (ref 46–116)
ALT SERPL W P-5'-P-CCNC: 29 U/L (ref 12–78)
ANION GAP SERPL CALCULATED.3IONS-SCNC: 8 MMOL/L (ref 4–13)
AST SERPL W P-5'-P-CCNC: 19 U/L (ref 5–45)
BILIRUB SERPL-MCNC: 0.35 MG/DL (ref 0.2–1)
BUN SERPL-MCNC: 6 MG/DL (ref 5–25)
CALCIUM SERPL-MCNC: 9 MG/DL (ref 8.3–10.1)
CHLORIDE SERPL-SCNC: 108 MMOL/L (ref 100–108)
CHOLEST SERPL-MCNC: 101 MG/DL (ref 50–200)
CO2 SERPL-SCNC: 28 MMOL/L (ref 21–32)
CREAT SERPL-MCNC: 0.66 MG/DL (ref 0.6–1.3)
GFR SERPL CREATININE-BSD FRML MDRD: >60 ML/MIN/1.73SQ M
GLUCOSE SERPL-MCNC: 84 MG/DL (ref 65–140)
HDLC SERPL-MCNC: 32 MG/DL (ref 40–60)
LDLC SERPL CALC-MCNC: 40 MG/DL (ref 0–100)
LIPASE SERPL-CCNC: 65 U/L (ref 73–393)
POTASSIUM SERPL-SCNC: 3.7 MMOL/L (ref 3.5–5.3)
PROT SERPL-MCNC: 6.6 G/DL (ref 6.4–8.2)
SODIUM SERPL-SCNC: 144 MMOL/L (ref 136–145)
TRIGL SERPL-MCNC: 144 MG/DL

## 2017-03-05 PROCEDURE — A9270 NON-COVERED ITEM OR SERVICE: HCPCS | Performed by: HOSPITALIST

## 2017-03-05 PROCEDURE — A9270 NON-COVERED ITEM OR SERVICE: HCPCS | Performed by: INTERNAL MEDICINE

## 2017-03-05 PROCEDURE — 80061 LIPID PANEL: CPT | Performed by: PHYSICIAN ASSISTANT

## 2017-03-05 PROCEDURE — C9113 INJ PANTOPRAZOLE SODIUM, VIA: HCPCS | Performed by: HOSPITALIST

## 2017-03-05 PROCEDURE — 80053 COMPREHEN METABOLIC PANEL: CPT | Performed by: INTERNAL MEDICINE

## 2017-03-05 PROCEDURE — 83690 ASSAY OF LIPASE: CPT | Performed by: INTERNAL MEDICINE

## 2017-03-05 PROCEDURE — A9270 NON-COVERED ITEM OR SERVICE: HCPCS | Performed by: PHYSICIAN ASSISTANT

## 2017-03-05 RX ORDER — PANTOPRAZOLE SODIUM 40 MG/1
40 TABLET, DELAYED RELEASE ORAL
Status: DISCONTINUED | OUTPATIENT
Start: 2017-03-06 | End: 2017-03-10 | Stop reason: HOSPADM

## 2017-03-05 RX ADMIN — OXYCODONE HYDROCHLORIDE 5 MG: 5 TABLET ORAL at 08:04

## 2017-03-05 RX ADMIN — HYDROMORPHONE HYDROCHLORIDE 1 MG: 1 INJECTION, SOLUTION INTRAMUSCULAR; INTRAVENOUS; SUBCUTANEOUS at 05:15

## 2017-03-05 RX ADMIN — SENNOSIDES 8.6 MG: 8.6 TABLET, FILM COATED ORAL at 08:04

## 2017-03-05 RX ADMIN — NICOTINE 1 PATCH: 14 PATCH, EXTENDED RELEASE TRANSDERMAL at 08:08

## 2017-03-05 RX ADMIN — DOCUSATE SODIUM 100 MG: 100 CAPSULE, LIQUID FILLED ORAL at 08:04

## 2017-03-05 RX ADMIN — HYDROMORPHONE HYDROCHLORIDE 1 MG: 1 INJECTION, SOLUTION INTRAMUSCULAR; INTRAVENOUS; SUBCUTANEOUS at 17:47

## 2017-03-05 RX ADMIN — SODIUM CHLORIDE 150 ML/HR: 0.9 INJECTION, SOLUTION INTRAVENOUS at 10:12

## 2017-03-05 RX ADMIN — DOCUSATE SODIUM 100 MG: 100 CAPSULE, LIQUID FILLED ORAL at 17:47

## 2017-03-05 RX ADMIN — HYDROMORPHONE HYDROCHLORIDE 1 MG: 1 INJECTION, SOLUTION INTRAMUSCULAR; INTRAVENOUS; SUBCUTANEOUS at 00:39

## 2017-03-05 RX ADMIN — TRAZODONE HYDROCHLORIDE 50 MG: 50 TABLET ORAL at 21:47

## 2017-03-05 RX ADMIN — ATORVASTATIN CALCIUM 40 MG: 40 TABLET, FILM COATED ORAL at 17:49

## 2017-03-05 RX ADMIN — SODIUM CHLORIDE 150 ML/HR: 0.9 INJECTION, SOLUTION INTRAVENOUS at 02:56

## 2017-03-05 RX ADMIN — OXYCODONE HYDROCHLORIDE 5 MG: 5 TABLET ORAL at 12:17

## 2017-03-05 RX ADMIN — PANCRELIPASE 24000 UNITS: 24000; 76000; 120000 CAPSULE, DELAYED RELEASE PELLETS ORAL at 17:48

## 2017-03-05 RX ADMIN — FENOFIBRATE 48 MG: 48 TABLET ORAL at 08:04

## 2017-03-05 RX ADMIN — ASPIRIN 325 MG: 325 TABLET ORAL at 08:04

## 2017-03-05 RX ADMIN — HYDROMORPHONE HYDROCHLORIDE 1 MG: 1 INJECTION, SOLUTION INTRAMUSCULAR; INTRAVENOUS; SUBCUTANEOUS at 10:12

## 2017-03-05 RX ADMIN — HYDROMORPHONE HYDROCHLORIDE 1 MG: 1 INJECTION, SOLUTION INTRAMUSCULAR; INTRAVENOUS; SUBCUTANEOUS at 13:43

## 2017-03-05 RX ADMIN — ENOXAPARIN SODIUM 40 MG: 40 INJECTION SUBCUTANEOUS at 08:11

## 2017-03-05 RX ADMIN — HYDROMORPHONE HYDROCHLORIDE 1 MG: 1 INJECTION, SOLUTION INTRAMUSCULAR; INTRAVENOUS; SUBCUTANEOUS at 21:47

## 2017-03-05 RX ADMIN — PANTOPRAZOLE SODIUM 40 MG: 40 INJECTION, POWDER, FOR SOLUTION INTRAVENOUS at 08:08

## 2017-03-05 RX ADMIN — PANCRELIPASE 24000 UNITS: 24000; 76000; 120000 CAPSULE, DELAYED RELEASE PELLETS ORAL at 12:17

## 2017-03-05 RX ADMIN — SODIUM CHLORIDE 75 ML/HR: 0.9 INJECTION, SOLUTION INTRAVENOUS at 19:06

## 2017-03-05 RX ADMIN — Medication 1000 MG: at 08:04

## 2017-03-05 RX ADMIN — OXYCODONE HYDROCHLORIDE 5 MG: 5 TABLET ORAL at 16:19

## 2017-03-05 RX ADMIN — PANCRELIPASE 24000 UNITS: 24000; 76000; 120000 CAPSULE, DELAYED RELEASE PELLETS ORAL at 08:04

## 2017-03-05 RX ADMIN — Medication 50 MG: at 08:04

## 2017-03-06 LAB
ALBUMIN SERPL BCP-MCNC: 3.6 G/DL (ref 3.5–5)
ALP SERPL-CCNC: 55 U/L (ref 46–116)
ALT SERPL W P-5'-P-CCNC: 30 U/L (ref 12–78)
ANION GAP SERPL CALCULATED.3IONS-SCNC: 7 MMOL/L (ref 4–13)
AST SERPL W P-5'-P-CCNC: 16 U/L (ref 5–45)
BILIRUB SERPL-MCNC: 0.38 MG/DL (ref 0.2–1)
BUN SERPL-MCNC: 7 MG/DL (ref 5–25)
CALCIUM SERPL-MCNC: 9.2 MG/DL (ref 8.3–10.1)
CHLORIDE SERPL-SCNC: 106 MMOL/L (ref 100–108)
CO2 SERPL-SCNC: 26 MMOL/L (ref 21–32)
CREAT SERPL-MCNC: 0.8 MG/DL (ref 0.6–1.3)
GFR SERPL CREATININE-BSD FRML MDRD: >60 ML/MIN/1.73SQ M
GLUCOSE SERPL-MCNC: 126 MG/DL (ref 65–140)
LIPASE SERPL-CCNC: 90 U/L (ref 73–393)
POTASSIUM SERPL-SCNC: 3.9 MMOL/L (ref 3.5–5.3)
PROT SERPL-MCNC: 7 G/DL (ref 6.4–8.2)
SODIUM SERPL-SCNC: 139 MMOL/L (ref 136–145)

## 2017-03-06 PROCEDURE — 80053 COMPREHEN METABOLIC PANEL: CPT | Performed by: INTERNAL MEDICINE

## 2017-03-06 PROCEDURE — A9270 NON-COVERED ITEM OR SERVICE: HCPCS | Performed by: HOSPITALIST

## 2017-03-06 PROCEDURE — A9270 NON-COVERED ITEM OR SERVICE: HCPCS | Performed by: PHYSICIAN ASSISTANT

## 2017-03-06 PROCEDURE — A9270 NON-COVERED ITEM OR SERVICE: HCPCS | Performed by: INTERNAL MEDICINE

## 2017-03-06 PROCEDURE — 83690 ASSAY OF LIPASE: CPT | Performed by: INTERNAL MEDICINE

## 2017-03-06 RX ADMIN — Medication 50 MG: at 08:27

## 2017-03-06 RX ADMIN — PANTOPRAZOLE SODIUM 40 MG: 40 TABLET, DELAYED RELEASE ORAL at 05:29

## 2017-03-06 RX ADMIN — HYDROMORPHONE HYDROCHLORIDE 1 MG: 1 INJECTION, SOLUTION INTRAMUSCULAR; INTRAVENOUS; SUBCUTANEOUS at 07:14

## 2017-03-06 RX ADMIN — HYDROMORPHONE HYDROCHLORIDE 1 MG: 1 INJECTION, SOLUTION INTRAMUSCULAR; INTRAVENOUS; SUBCUTANEOUS at 13:28

## 2017-03-06 RX ADMIN — HYDROMORPHONE HYDROCHLORIDE 1 MG: 1 INJECTION, SOLUTION INTRAMUSCULAR; INTRAVENOUS; SUBCUTANEOUS at 20:02

## 2017-03-06 RX ADMIN — HYDROMORPHONE HYDROCHLORIDE 1 MG: 1 INJECTION, SOLUTION INTRAMUSCULAR; INTRAVENOUS; SUBCUTANEOUS at 02:17

## 2017-03-06 RX ADMIN — HYDROMORPHONE HYDROCHLORIDE 1 MG: 1 INJECTION, SOLUTION INTRAMUSCULAR; INTRAVENOUS; SUBCUTANEOUS at 23:55

## 2017-03-06 RX ADMIN — ASPIRIN 325 MG: 325 TABLET ORAL at 08:27

## 2017-03-06 RX ADMIN — HYDROMORPHONE HYDROCHLORIDE 1 MG: 1 INJECTION, SOLUTION INTRAMUSCULAR; INTRAVENOUS; SUBCUTANEOUS at 10:52

## 2017-03-06 RX ADMIN — OXYCODONE HYDROCHLORIDE 5 MG: 5 TABLET ORAL at 12:32

## 2017-03-06 RX ADMIN — ENOXAPARIN SODIUM 40 MG: 40 INJECTION SUBCUTANEOUS at 08:30

## 2017-03-06 RX ADMIN — SENNOSIDES 8.6 MG: 8.6 TABLET, FILM COATED ORAL at 08:26

## 2017-03-06 RX ADMIN — DOCUSATE SODIUM 100 MG: 100 CAPSULE, LIQUID FILLED ORAL at 08:27

## 2017-03-06 RX ADMIN — OXYCODONE HYDROCHLORIDE 5 MG: 5 TABLET ORAL at 08:26

## 2017-03-06 RX ADMIN — PANCRELIPASE 24000 UNITS: 24000; 76000; 120000 CAPSULE, DELAYED RELEASE PELLETS ORAL at 08:27

## 2017-03-06 RX ADMIN — SODIUM CHLORIDE 75 ML/HR: 0.9 INJECTION, SOLUTION INTRAVENOUS at 20:05

## 2017-03-06 RX ADMIN — SODIUM CHLORIDE 75 ML/HR: 0.9 INJECTION, SOLUTION INTRAVENOUS at 08:32

## 2017-03-06 RX ADMIN — NICOTINE 1 PATCH: 14 PATCH, EXTENDED RELEASE TRANSDERMAL at 08:30

## 2017-03-06 RX ADMIN — PANCRELIPASE 24000 UNITS: 24000; 76000; 120000 CAPSULE, DELAYED RELEASE PELLETS ORAL at 12:32

## 2017-03-06 RX ADMIN — FENOFIBRATE 48 MG: 48 TABLET ORAL at 08:26

## 2017-03-06 RX ADMIN — PANCRELIPASE 24000 UNITS: 24000; 76000; 120000 CAPSULE, DELAYED RELEASE PELLETS ORAL at 17:17

## 2017-03-06 RX ADMIN — DOCUSATE SODIUM 100 MG: 100 CAPSULE, LIQUID FILLED ORAL at 17:17

## 2017-03-06 RX ADMIN — Medication 1000 MG: at 08:27

## 2017-03-06 RX ADMIN — TRAZODONE HYDROCHLORIDE 50 MG: 50 TABLET ORAL at 23:55

## 2017-03-06 RX ADMIN — HYDROMORPHONE HYDROCHLORIDE 1 MG: 1 INJECTION, SOLUTION INTRAMUSCULAR; INTRAVENOUS; SUBCUTANEOUS at 16:40

## 2017-03-06 RX ADMIN — OXYCODONE HYDROCHLORIDE 5 MG: 5 TABLET ORAL at 17:17

## 2017-03-06 RX ADMIN — OXYCODONE HYDROCHLORIDE 5 MG: 5 TABLET ORAL at 22:03

## 2017-03-07 LAB
BACTERIA BLD CULT: NORMAL
BACTERIA BLD CULT: NORMAL

## 2017-03-07 PROCEDURE — A9270 NON-COVERED ITEM OR SERVICE: HCPCS | Performed by: HOSPITALIST

## 2017-03-07 PROCEDURE — A9270 NON-COVERED ITEM OR SERVICE: HCPCS | Performed by: PHYSICIAN ASSISTANT

## 2017-03-07 PROCEDURE — A9270 NON-COVERED ITEM OR SERVICE: HCPCS | Performed by: INTERNAL MEDICINE

## 2017-03-07 RX ORDER — ROSUVASTATIN CALCIUM 10 MG/1
20 TABLET, COATED ORAL
Status: DISCONTINUED | OUTPATIENT
Start: 2017-03-07 | End: 2017-03-08 | Stop reason: CLARIF

## 2017-03-07 RX ORDER — SENNOSIDES 8.6 MG
2 TABLET ORAL 2 TIMES DAILY
Status: DISCONTINUED | OUTPATIENT
Start: 2017-03-08 | End: 2017-03-08

## 2017-03-07 RX ORDER — ATORVASTATIN CALCIUM 80 MG/1
80 TABLET, FILM COATED ORAL
Status: DISCONTINUED | OUTPATIENT
Start: 2017-03-07 | End: 2017-03-07 | Stop reason: ALTCHOICE

## 2017-03-07 RX ORDER — HYDROMORPHONE HCL 110MG/55ML
1.5 PATIENT CONTROLLED ANALGESIA SYRINGE INTRAVENOUS
Status: DISCONTINUED | OUTPATIENT
Start: 2017-03-07 | End: 2017-03-10

## 2017-03-07 RX ORDER — POLYETHYLENE GLYCOL 3350 17 G/17G
17 POWDER, FOR SOLUTION ORAL DAILY PRN
Status: DISCONTINUED | OUTPATIENT
Start: 2017-03-07 | End: 2017-03-10 | Stop reason: HOSPADM

## 2017-03-07 RX ADMIN — ASPIRIN 325 MG: 325 TABLET ORAL at 08:00

## 2017-03-07 RX ADMIN — Medication 50 MG: at 07:57

## 2017-03-07 RX ADMIN — OXYCODONE HYDROCHLORIDE 5 MG: 5 TABLET ORAL at 06:33

## 2017-03-07 RX ADMIN — HYDROMORPHONE HYDROCHLORIDE 1 MG: 1 INJECTION, SOLUTION INTRAMUSCULAR; INTRAVENOUS; SUBCUTANEOUS at 03:01

## 2017-03-07 RX ADMIN — ENOXAPARIN SODIUM 40 MG: 40 INJECTION SUBCUTANEOUS at 08:00

## 2017-03-07 RX ADMIN — HYDROMORPHONE HYDROCHLORIDE 1 MG: 1 INJECTION, SOLUTION INTRAMUSCULAR; INTRAVENOUS; SUBCUTANEOUS at 08:47

## 2017-03-07 RX ADMIN — HYDROMORPHONE HYDROCHLORIDE 1.5 MG: 2 INJECTION, SOLUTION INTRAMUSCULAR; INTRAVENOUS; SUBCUTANEOUS at 20:54

## 2017-03-07 RX ADMIN — HYDROMORPHONE HYDROCHLORIDE 1 MG: 1 INJECTION, SOLUTION INTRAMUSCULAR; INTRAVENOUS; SUBCUTANEOUS at 12:00

## 2017-03-07 RX ADMIN — FENOFIBRATE 48 MG: 48 TABLET ORAL at 08:00

## 2017-03-07 RX ADMIN — SODIUM CHLORIDE 75 ML/HR: 0.9 INJECTION, SOLUTION INTRAVENOUS at 21:00

## 2017-03-07 RX ADMIN — BISACODYL 10 MG: 5 TABLET, COATED ORAL at 11:59

## 2017-03-07 RX ADMIN — PANCRELIPASE 24000 UNITS: 24000; 76000; 120000 CAPSULE, DELAYED RELEASE PELLETS ORAL at 11:59

## 2017-03-07 RX ADMIN — Medication 1000 MG: at 08:00

## 2017-03-07 RX ADMIN — SENNOSIDES 8.6 MG: 8.6 TABLET, FILM COATED ORAL at 08:00

## 2017-03-07 RX ADMIN — HYDROMORPHONE HYDROCHLORIDE 1 MG: 1 INJECTION, SOLUTION INTRAMUSCULAR; INTRAVENOUS; SUBCUTANEOUS at 18:44

## 2017-03-07 RX ADMIN — PANCRELIPASE 24000 UNITS: 24000; 76000; 120000 CAPSULE, DELAYED RELEASE PELLETS ORAL at 07:57

## 2017-03-07 RX ADMIN — DOCUSATE SODIUM 100 MG: 100 CAPSULE, LIQUID FILLED ORAL at 17:16

## 2017-03-07 RX ADMIN — HYDROMORPHONE HYDROCHLORIDE 1 MG: 1 INJECTION, SOLUTION INTRAMUSCULAR; INTRAVENOUS; SUBCUTANEOUS at 15:21

## 2017-03-07 RX ADMIN — SODIUM CHLORIDE 75 ML/HR: 0.9 INJECTION, SOLUTION INTRAVENOUS at 08:04

## 2017-03-07 RX ADMIN — ROSUVASTATIN CALCIUM 20 MG: 10 TABLET, COATED ORAL at 20:56

## 2017-03-07 RX ADMIN — NICOTINE 1 PATCH: 14 PATCH, EXTENDED RELEASE TRANSDERMAL at 08:00

## 2017-03-07 RX ADMIN — PANTOPRAZOLE SODIUM 40 MG: 40 TABLET, DELAYED RELEASE ORAL at 06:29

## 2017-03-07 RX ADMIN — OXYCODONE HYDROCHLORIDE 5 MG: 5 TABLET ORAL at 07:56

## 2017-03-07 RX ADMIN — OXYCODONE HYDROCHLORIDE 5 MG: 5 TABLET ORAL at 17:18

## 2017-03-07 RX ADMIN — DOCUSATE SODIUM 100 MG: 100 CAPSULE, LIQUID FILLED ORAL at 08:00

## 2017-03-07 RX ADMIN — PANCRELIPASE 24000 UNITS: 24000; 76000; 120000 CAPSULE, DELAYED RELEASE PELLETS ORAL at 17:16

## 2017-03-08 ENCOUNTER — APPOINTMENT (INPATIENT)
Dept: RADIOLOGY | Facility: HOSPITAL | Age: 42
DRG: 698 | End: 2017-03-08
Payer: COMMERCIAL

## 2017-03-08 DIAGNOSIS — K75.0 ABSCESS OF LIVER: ICD-10-CM

## 2017-03-08 LAB
ALBUMIN SERPL BCP-MCNC: 3.6 G/DL (ref 3.5–5)
ALP SERPL-CCNC: 56 U/L (ref 46–116)
ALT SERPL W P-5'-P-CCNC: 24 U/L (ref 12–78)
ANION GAP SERPL CALCULATED.3IONS-SCNC: 8 MMOL/L (ref 4–13)
AST SERPL W P-5'-P-CCNC: 10 U/L (ref 5–45)
BILIRUB SERPL-MCNC: 0.39 MG/DL (ref 0.2–1)
BUN SERPL-MCNC: 7 MG/DL (ref 5–25)
CALCIUM SERPL-MCNC: 9.2 MG/DL (ref 8.3–10.1)
CHLORIDE SERPL-SCNC: 106 MMOL/L (ref 100–108)
CO2 SERPL-SCNC: 28 MMOL/L (ref 21–32)
CREAT SERPL-MCNC: 0.68 MG/DL (ref 0.6–1.3)
GFR SERPL CREATININE-BSD FRML MDRD: >60 ML/MIN/1.73SQ M
GLUCOSE SERPL-MCNC: 93 MG/DL (ref 65–140)
LIPASE SERPL-CCNC: 68 U/L (ref 73–393)
POTASSIUM SERPL-SCNC: 3.9 MMOL/L (ref 3.5–5.3)
PROT SERPL-MCNC: 7.2 G/DL (ref 6.4–8.2)
SODIUM SERPL-SCNC: 142 MMOL/L (ref 136–145)

## 2017-03-08 PROCEDURE — A9270 NON-COVERED ITEM OR SERVICE: HCPCS | Performed by: HOSPITALIST

## 2017-03-08 PROCEDURE — 80053 COMPREHEN METABOLIC PANEL: CPT | Performed by: INTERNAL MEDICINE

## 2017-03-08 PROCEDURE — A9585 GADOBUTROL INJECTION: HCPCS | Performed by: INTERNAL MEDICINE

## 2017-03-08 PROCEDURE — 83690 ASSAY OF LIPASE: CPT | Performed by: INTERNAL MEDICINE

## 2017-03-08 PROCEDURE — A9270 NON-COVERED ITEM OR SERVICE: HCPCS | Performed by: INTERNAL MEDICINE

## 2017-03-08 PROCEDURE — A9270 NON-COVERED ITEM OR SERVICE: HCPCS | Performed by: PHYSICIAN ASSISTANT

## 2017-03-08 PROCEDURE — 74183 MRI ABD W/O CNTR FLWD CNTR: CPT

## 2017-03-08 RX ORDER — SENNOSIDES 8.6 MG
2 TABLET ORAL 2 TIMES DAILY PRN
Status: DISCONTINUED | OUTPATIENT
Start: 2017-03-08 | End: 2017-03-10 | Stop reason: HOSPADM

## 2017-03-08 RX ORDER — ROSUVASTATIN CALCIUM 20 MG/1
20 TABLET, COATED ORAL
Status: DISCONTINUED | OUTPATIENT
Start: 2017-03-08 | End: 2017-03-10 | Stop reason: HOSPADM

## 2017-03-08 RX ADMIN — GADOBUTROL 8 ML: 604.72 INJECTION INTRAVENOUS at 14:54

## 2017-03-08 RX ADMIN — ROSUVASTATIN CALCIUM 20 MG: 20 TABLET, COATED ORAL at 17:24

## 2017-03-08 RX ADMIN — DOCUSATE SODIUM 100 MG: 100 CAPSULE, LIQUID FILLED ORAL at 08:07

## 2017-03-08 RX ADMIN — OXYCODONE HYDROCHLORIDE 5 MG: 5 TABLET ORAL at 22:17

## 2017-03-08 RX ADMIN — PANCRELIPASE 24000 UNITS: 24000; 76000; 120000 CAPSULE, DELAYED RELEASE PELLETS ORAL at 08:07

## 2017-03-08 RX ADMIN — HYDROMORPHONE HYDROCHLORIDE 1.5 MG: 2 INJECTION, SOLUTION INTRAMUSCULAR; INTRAVENOUS; SUBCUTANEOUS at 00:00

## 2017-03-08 RX ADMIN — Medication 1000 MG: at 08:07

## 2017-03-08 RX ADMIN — HYDROMORPHONE HYDROCHLORIDE 1.5 MG: 2 INJECTION, SOLUTION INTRAMUSCULAR; INTRAVENOUS; SUBCUTANEOUS at 13:14

## 2017-03-08 RX ADMIN — NICOTINE 1 PATCH: 14 PATCH, EXTENDED RELEASE TRANSDERMAL at 08:10

## 2017-03-08 RX ADMIN — HYDROMORPHONE HYDROCHLORIDE 1.5 MG: 2 INJECTION, SOLUTION INTRAMUSCULAR; INTRAVENOUS; SUBCUTANEOUS at 19:39

## 2017-03-08 RX ADMIN — SENNOSIDES 17.2 MG: 8.6 TABLET, FILM COATED ORAL at 08:07

## 2017-03-08 RX ADMIN — FENOFIBRATE 48 MG: 48 TABLET ORAL at 08:13

## 2017-03-08 RX ADMIN — PANCRELIPASE 24000 UNITS: 24000; 76000; 120000 CAPSULE, DELAYED RELEASE PELLETS ORAL at 12:23

## 2017-03-08 RX ADMIN — SODIUM CHLORIDE 75 ML/HR: 0.9 INJECTION, SOLUTION INTRAVENOUS at 22:40

## 2017-03-08 RX ADMIN — PANTOPRAZOLE SODIUM 40 MG: 40 TABLET, DELAYED RELEASE ORAL at 06:43

## 2017-03-08 RX ADMIN — HYDROMORPHONE HYDROCHLORIDE 1.5 MG: 2 INJECTION, SOLUTION INTRAMUSCULAR; INTRAVENOUS; SUBCUTANEOUS at 22:23

## 2017-03-08 RX ADMIN — ASPIRIN 325 MG: 325 TABLET ORAL at 08:07

## 2017-03-08 RX ADMIN — HYDROMORPHONE HYDROCHLORIDE 1.5 MG: 2 INJECTION, SOLUTION INTRAMUSCULAR; INTRAVENOUS; SUBCUTANEOUS at 16:32

## 2017-03-08 RX ADMIN — Medication 50 MG: at 08:13

## 2017-03-08 RX ADMIN — PANCRELIPASE 24000 UNITS: 24000; 76000; 120000 CAPSULE, DELAYED RELEASE PELLETS ORAL at 17:24

## 2017-03-08 RX ADMIN — TRAZODONE HYDROCHLORIDE 50 MG: 50 TABLET ORAL at 00:00

## 2017-03-08 RX ADMIN — HYDROMORPHONE HYDROCHLORIDE 1.5 MG: 2 INJECTION, SOLUTION INTRAMUSCULAR; INTRAVENOUS; SUBCUTANEOUS at 09:49

## 2017-03-08 RX ADMIN — HYDROMORPHONE HYDROCHLORIDE 1.5 MG: 2 INJECTION, SOLUTION INTRAMUSCULAR; INTRAVENOUS; SUBCUTANEOUS at 06:43

## 2017-03-08 RX ADMIN — DOCUSATE SODIUM 100 MG: 100 CAPSULE, LIQUID FILLED ORAL at 17:24

## 2017-03-08 RX ADMIN — ENOXAPARIN SODIUM 40 MG: 40 INJECTION SUBCUTANEOUS at 08:09

## 2017-03-09 PROCEDURE — A9270 NON-COVERED ITEM OR SERVICE: HCPCS | Performed by: HOSPITALIST

## 2017-03-09 PROCEDURE — A9270 NON-COVERED ITEM OR SERVICE: HCPCS | Performed by: INTERNAL MEDICINE

## 2017-03-09 RX ORDER — HYDROMORPHONE HCL 110MG/55ML
PATIENT CONTROLLED ANALGESIA SYRINGE INTRAVENOUS
Status: DISPENSED
Start: 2017-03-09 | End: 2017-03-10

## 2017-03-09 RX ADMIN — HYDROMORPHONE HYDROCHLORIDE 1.5 MG: 2 INJECTION, SOLUTION INTRAMUSCULAR; INTRAVENOUS; SUBCUTANEOUS at 12:30

## 2017-03-09 RX ADMIN — Medication 1000 MG: at 09:38

## 2017-03-09 RX ADMIN — FENOFIBRATE 48 MG: 48 TABLET ORAL at 09:38

## 2017-03-09 RX ADMIN — ENOXAPARIN SODIUM 40 MG: 40 INJECTION SUBCUTANEOUS at 09:38

## 2017-03-09 RX ADMIN — SODIUM CHLORIDE 75 ML/HR: 0.9 INJECTION, SOLUTION INTRAVENOUS at 12:24

## 2017-03-09 RX ADMIN — HYDROMORPHONE HYDROCHLORIDE 1.5 MG: 2 INJECTION, SOLUTION INTRAMUSCULAR; INTRAVENOUS; SUBCUTANEOUS at 01:21

## 2017-03-09 RX ADMIN — ASPIRIN 325 MG: 325 TABLET ORAL at 09:38

## 2017-03-09 RX ADMIN — TRAZODONE HYDROCHLORIDE 50 MG: 50 TABLET ORAL at 00:02

## 2017-03-09 RX ADMIN — HYDROMORPHONE HYDROCHLORIDE 1.5 MG: 2 INJECTION, SOLUTION INTRAMUSCULAR; INTRAVENOUS; SUBCUTANEOUS at 06:19

## 2017-03-09 RX ADMIN — PANCRELIPASE 24000 UNITS: 24000; 76000; 120000 CAPSULE, DELAYED RELEASE PELLETS ORAL at 09:37

## 2017-03-09 RX ADMIN — DOCUSATE SODIUM 100 MG: 100 CAPSULE, LIQUID FILLED ORAL at 09:38

## 2017-03-09 RX ADMIN — NICOTINE 1 PATCH: 14 PATCH, EXTENDED RELEASE TRANSDERMAL at 09:38

## 2017-03-09 RX ADMIN — ROSUVASTATIN CALCIUM 20 MG: 20 TABLET, COATED ORAL at 18:33

## 2017-03-09 RX ADMIN — PANCRELIPASE 24000 UNITS: 24000; 76000; 120000 CAPSULE, DELAYED RELEASE PELLETS ORAL at 18:33

## 2017-03-09 RX ADMIN — HYDROMORPHONE HYDROCHLORIDE 1.5 MG: 2 INJECTION, SOLUTION INTRAMUSCULAR; INTRAVENOUS; SUBCUTANEOUS at 15:31

## 2017-03-09 RX ADMIN — PANCRELIPASE 24000 UNITS: 24000; 76000; 120000 CAPSULE, DELAYED RELEASE PELLETS ORAL at 12:31

## 2017-03-09 RX ADMIN — HYDROMORPHONE HYDROCHLORIDE 1.5 MG: 2 INJECTION, SOLUTION INTRAMUSCULAR; INTRAVENOUS; SUBCUTANEOUS at 09:38

## 2017-03-09 RX ADMIN — DOCUSATE SODIUM 100 MG: 100 CAPSULE, LIQUID FILLED ORAL at 18:33

## 2017-03-09 RX ADMIN — PANTOPRAZOLE SODIUM 40 MG: 40 TABLET, DELAYED RELEASE ORAL at 06:19

## 2017-03-09 RX ADMIN — Medication 50 MG: at 09:39

## 2017-03-09 RX ADMIN — HYDROMORPHONE HYDROCHLORIDE 1.5 MG: 2 INJECTION, SOLUTION INTRAMUSCULAR; INTRAVENOUS; SUBCUTANEOUS at 18:33

## 2017-03-10 VITALS
HEART RATE: 55 BPM | HEIGHT: 71 IN | RESPIRATION RATE: 18 BRPM | SYSTOLIC BLOOD PRESSURE: 137 MMHG | DIASTOLIC BLOOD PRESSURE: 64 MMHG | WEIGHT: 186 LBS | BODY MASS INDEX: 26.04 KG/M2 | TEMPERATURE: 97.8 F | OXYGEN SATURATION: 95 %

## 2017-03-10 PROCEDURE — A9270 NON-COVERED ITEM OR SERVICE: HCPCS | Performed by: INTERNAL MEDICINE

## 2017-03-10 PROCEDURE — A9270 NON-COVERED ITEM OR SERVICE: HCPCS | Performed by: HOSPITALIST

## 2017-03-10 RX ORDER — DOCUSATE SODIUM 100 MG/1
100 CAPSULE, LIQUID FILLED ORAL 2 TIMES DAILY
Qty: 60 CAPSULE | Refills: 0 | Status: SHIPPED | OUTPATIENT
Start: 2017-03-10 | End: 2017-08-08 | Stop reason: HOSPADM

## 2017-03-10 RX ORDER — HYDROMORPHONE HCL 110MG/55ML
PATIENT CONTROLLED ANALGESIA SYRINGE INTRAVENOUS
Status: COMPLETED
Start: 2017-03-10 | End: 2017-03-10

## 2017-03-10 RX ORDER — ACETAMINOPHEN 325 MG/1
650 TABLET ORAL EVERY 6 HOURS PRN
Qty: 30 TABLET | Refills: 0 | Status: SHIPPED | OUTPATIENT
Start: 2017-03-10 | End: 2017-04-09

## 2017-03-10 RX ORDER — HYDROMORPHONE HCL 110MG/55ML
1.5 PATIENT CONTROLLED ANALGESIA SYRINGE INTRAVENOUS
Status: DISCONTINUED | OUTPATIENT
Start: 2017-03-10 | End: 2017-03-10 | Stop reason: HOSPADM

## 2017-03-10 RX ORDER — HEPARIN SODIUM 5000 [USP'U]/ML
INJECTION, SOLUTION INTRAVENOUS; SUBCUTANEOUS
Status: COMPLETED
Start: 2017-03-10 | End: 2017-03-10

## 2017-03-10 RX ORDER — SENNOSIDES 8.6 MG
2 TABLET ORAL 2 TIMES DAILY PRN
Qty: 120 EACH | Refills: 0 | Status: SHIPPED | OUTPATIENT
Start: 2017-03-10 | End: 2017-05-08

## 2017-03-10 RX ORDER — NICOTINE 21 MG/24HR
1 PATCH, TRANSDERMAL 24 HOURS TRANSDERMAL DAILY
Qty: 30 PATCH | Refills: 0 | Status: SHIPPED | OUTPATIENT
Start: 2017-03-10 | End: 2017-04-09

## 2017-03-10 RX ORDER — MORPHINE SULFATE 15 MG/1
15 TABLET ORAL EVERY 4 HOURS PRN
Status: DISCONTINUED | OUTPATIENT
Start: 2017-03-10 | End: 2017-03-10 | Stop reason: HOSPADM

## 2017-03-10 RX ORDER — MORPHINE SULFATE 15 MG/1
TABLET ORAL
Qty: 60 TABLET | Refills: 0 | Status: SHIPPED | OUTPATIENT
Start: 2017-03-10 | End: 2017-05-08

## 2017-03-10 RX ORDER — POLYETHYLENE GLYCOL 3350 17 G/17G
17 POWDER, FOR SOLUTION ORAL DAILY PRN
Qty: 14 EACH | Refills: 0 | Status: SHIPPED | OUTPATIENT
Start: 2017-03-10 | End: 2017-05-08

## 2017-03-10 RX ADMIN — HYDROMORPHONE HYDROCHLORIDE 1 MG: 1 INJECTION, SOLUTION INTRAMUSCULAR; INTRAVENOUS; SUBCUTANEOUS at 08:43

## 2017-03-10 RX ADMIN — HYDROMORPHONE HYDROCHLORIDE 1.5 MG: 2 INJECTION, SOLUTION INTRAMUSCULAR; INTRAVENOUS; SUBCUTANEOUS at 01:30

## 2017-03-10 RX ADMIN — Medication 50 MG: at 08:35

## 2017-03-10 RX ADMIN — ASPIRIN 325 MG: 325 TABLET ORAL at 08:35

## 2017-03-10 RX ADMIN — HEPARIN SODIUM 5000 UNITS: 5000 INJECTION, SOLUTION INTRAVENOUS; SUBCUTANEOUS at 05:59

## 2017-03-10 RX ADMIN — NICOTINE 1 PATCH: 14 PATCH, EXTENDED RELEASE TRANSDERMAL at 08:35

## 2017-03-10 RX ADMIN — PANTOPRAZOLE SODIUM 40 MG: 40 TABLET, DELAYED RELEASE ORAL at 05:59

## 2017-03-10 RX ADMIN — DOCUSATE SODIUM 100 MG: 100 CAPSULE, LIQUID FILLED ORAL at 08:35

## 2017-03-10 RX ADMIN — ENOXAPARIN SODIUM 40 MG: 40 INJECTION SUBCUTANEOUS at 08:35

## 2017-03-10 RX ADMIN — Medication 1000 MG: at 08:35

## 2017-03-10 RX ADMIN — SENNOSIDES 17.2 MG: 8.6 TABLET, FILM COATED ORAL at 13:29

## 2017-03-10 RX ADMIN — MORPHINE SULFATE 15 MG: 15 TABLET ORAL at 12:22

## 2017-03-10 RX ADMIN — FENOFIBRATE 48 MG: 48 TABLET ORAL at 08:33

## 2017-03-10 RX ADMIN — MORPHINE SULFATE 15 MG: 15 TABLET ORAL at 08:34

## 2017-03-10 RX ADMIN — PANCRELIPASE 24000 UNITS: 24000; 76000; 120000 CAPSULE, DELAYED RELEASE PELLETS ORAL at 12:22

## 2017-03-10 RX ADMIN — SODIUM CHLORIDE 75 ML/HR: 0.9 INJECTION, SOLUTION INTRAVENOUS at 01:29

## 2017-03-10 RX ADMIN — PANCRELIPASE 24000 UNITS: 24000; 76000; 120000 CAPSULE, DELAYED RELEASE PELLETS ORAL at 08:34

## 2017-03-16 ENCOUNTER — ALLSCRIPTS OFFICE VISIT (OUTPATIENT)
Dept: OTHER | Facility: OTHER | Age: 42
End: 2017-03-16

## 2017-03-21 ENCOUNTER — ALLSCRIPTS OFFICE VISIT (OUTPATIENT)
Dept: OTHER | Facility: OTHER | Age: 42
End: 2017-03-21

## 2017-03-23 ENCOUNTER — ALLSCRIPTS OFFICE VISIT (OUTPATIENT)
Dept: OTHER | Facility: OTHER | Age: 42
End: 2017-03-23

## 2017-03-30 ENCOUNTER — GENERIC CONVERSION - ENCOUNTER (OUTPATIENT)
Dept: OTHER | Facility: OTHER | Age: 42
End: 2017-03-30

## 2017-04-12 ENCOUNTER — ALLSCRIPTS OFFICE VISIT (OUTPATIENT)
Dept: OTHER | Facility: OTHER | Age: 42
End: 2017-04-12

## 2017-04-25 ENCOUNTER — ALLSCRIPTS OFFICE VISIT (OUTPATIENT)
Dept: OTHER | Facility: OTHER | Age: 42
End: 2017-04-25

## 2017-04-25 DIAGNOSIS — K86.1 OTHER CHRONIC PANCREATITIS (HCC): ICD-10-CM

## 2017-04-26 ENCOUNTER — ALLSCRIPTS OFFICE VISIT (OUTPATIENT)
Dept: OTHER | Facility: OTHER | Age: 42
End: 2017-04-26

## 2017-04-27 ENCOUNTER — GENERIC CONVERSION - ENCOUNTER (OUTPATIENT)
Dept: OTHER | Facility: OTHER | Age: 42
End: 2017-04-27

## 2017-04-27 ENCOUNTER — LAB CONVERSION - ENCOUNTER (OUTPATIENT)
Dept: OTHER | Facility: OTHER | Age: 42
End: 2017-04-27

## 2017-04-27 LAB
A/G RATIO (HISTORICAL): 2.3 (CALC) (ref 1–2.5)
ALBUMIN SERPL BCP-MCNC: 5.1 G/DL (ref 3.6–5.1)
ALP SERPL-CCNC: 70 U/L (ref 40–115)
ALT SERPL W P-5'-P-CCNC: 24 U/L (ref 9–46)
AST SERPL W P-5'-P-CCNC: 27 U/L (ref 10–40)
BILIRUB SERPL-MCNC: 1.8 MG/DL (ref 0.2–1.2)
BUN SERPL-MCNC: 12 MG/DL (ref 7–25)
BUN/CREA RATIO (HISTORICAL): ABNORMAL (CALC) (ref 6–22)
CALCIUM SERPL-MCNC: 9.9 MG/DL (ref 8.6–10.3)
CHLORIDE SERPL-SCNC: 103 MMOL/L (ref 98–110)
CHOLEST SERPL-MCNC: 114 MG/DL (ref 125–200)
CHOLEST/HDLC SERPL: 4.1 (CALC)
CO2 SERPL-SCNC: 26 MMOL/L (ref 20–31)
CREAT SERPL-MCNC: 0.8 MG/DL (ref 0.6–1.35)
EGFR AFRICAN AMERICAN (HISTORICAL): 126 ML/MIN/1.73M2
EGFR-AMERICAN CALC (HISTORICAL): 109 ML/MIN/1.73M2
GAMMA GLOBULIN (HISTORICAL): 2.2 G/DL (CALC) (ref 1.9–3.7)
GLUCOSE (HISTORICAL): 100 MG/DL (ref 65–99)
HDLC SERPL-MCNC: 28 MG/DL
LDL CHOLESTEROL (HISTORICAL): ABNORMAL MG/DL (CALC)
LIPASE SERPL-CCNC: 15 U/L (ref 7–60)
NON-HDL-CHOL (CHOL-HDL) (HISTORICAL): 86 MG/DL (CALC)
POTASSIUM SERPL-SCNC: 4.3 MMOL/L (ref 3.5–5.3)
SODIUM SERPL-SCNC: 140 MMOL/L (ref 135–146)
TOTAL PROTEIN (HISTORICAL): 7.3 G/DL (ref 6.1–8.1)
TRIGL SERPL-MCNC: 449 MG/DL

## 2017-05-08 RX ORDER — TEMAZEPAM 15 MG/1
15 CAPSULE ORAL
COMMUNITY
End: 2017-05-08

## 2017-05-09 ENCOUNTER — ANESTHESIA EVENT (OUTPATIENT)
Dept: PERIOP | Facility: HOSPITAL | Age: 42
End: 2017-05-09
Payer: COMMERCIAL

## 2017-05-09 ENCOUNTER — GENERIC CONVERSION - ENCOUNTER (OUTPATIENT)
Dept: PERIOP | Facility: HOSPITAL | Age: 42
End: 2017-05-09

## 2017-05-09 ENCOUNTER — ANESTHESIA (OUTPATIENT)
Dept: PERIOP | Facility: HOSPITAL | Age: 42
End: 2017-05-09
Payer: COMMERCIAL

## 2017-05-09 ENCOUNTER — APPOINTMENT (OUTPATIENT)
Dept: RADIOLOGY | Facility: HOSPITAL | Age: 42
End: 2017-05-09
Payer: COMMERCIAL

## 2017-05-09 ENCOUNTER — HOSPITAL ENCOUNTER (OUTPATIENT)
Facility: HOSPITAL | Age: 42
Setting detail: OUTPATIENT SURGERY
Discharge: HOME | End: 2017-05-09
Attending: ANESTHESIOLOGY | Admitting: ANESTHESIOLOGY
Payer: COMMERCIAL

## 2017-05-09 VITALS
WEIGHT: 184 LBS | DIASTOLIC BLOOD PRESSURE: 84 MMHG | HEIGHT: 71 IN | BODY MASS INDEX: 25.76 KG/M2 | SYSTOLIC BLOOD PRESSURE: 125 MMHG | RESPIRATION RATE: 18 BRPM | OXYGEN SATURATION: 94 % | HEART RATE: 61 BPM | TEMPERATURE: 97.7 F

## 2017-05-09 PROCEDURE — 72070 X-RAY EXAM THORAC SPINE 2VWS: CPT

## 2017-05-09 PROCEDURE — A9270 NON-COVERED ITEM OR SERVICE: HCPCS | Performed by: ANESTHESIOLOGY

## 2017-05-09 RX ORDER — PROPOFOL 10 MG/ML
INJECTION, EMULSION INTRAVENOUS CONTINUOUS PRN
Status: DISCONTINUED | OUTPATIENT
Start: 2017-05-09 | End: 2017-05-09 | Stop reason: SURG

## 2017-05-09 RX ORDER — OXYCODONE HYDROCHLORIDE AND ACETAMINOPHEN 5; 325 MG/1; MG/1
2 TABLET ORAL ONCE
Status: DISCONTINUED | OUTPATIENT
Start: 2017-05-09 | End: 2020-01-06

## 2017-05-09 RX ORDER — ONDANSETRON 2 MG/ML
4 INJECTION INTRAMUSCULAR; INTRAVENOUS ONCE
Status: DISCONTINUED | OUTPATIENT
Start: 2017-05-09 | End: 2017-05-09 | Stop reason: HOSPADM

## 2017-05-09 RX ORDER — MIDAZOLAM HYDROCHLORIDE 1 MG/ML
INJECTION INTRAMUSCULAR; INTRAVENOUS AS NEEDED
Status: DISCONTINUED | OUTPATIENT
Start: 2017-05-09 | End: 2017-05-09 | Stop reason: SURG

## 2017-05-09 RX ORDER — LIDOCAINE HYDROCHLORIDE 20 MG/ML
INJECTION, SOLUTION INFILTRATION; PERINEURAL AS NEEDED
Status: DISCONTINUED | OUTPATIENT
Start: 2017-05-09 | End: 2017-05-09 | Stop reason: HOSPADM

## 2017-05-09 RX ORDER — DEXAMETHASONE SODIUM PHOSPHATE 10 MG/ML
INJECTION, SOLUTION INTRAMUSCULAR; INTRAVENOUS AS NEEDED
Status: DISCONTINUED | OUTPATIENT
Start: 2017-05-09 | End: 2017-05-09 | Stop reason: HOSPADM

## 2017-05-09 RX ORDER — SODIUM CHLORIDE, SODIUM LACTATE, POTASSIUM CHLORIDE, CALCIUM CHLORIDE 600; 310; 30; 20 MG/100ML; MG/100ML; MG/100ML; MG/100ML
INJECTION, SOLUTION INTRAVENOUS CONTINUOUS PRN
Status: DISCONTINUED | OUTPATIENT
Start: 2017-05-09 | End: 2017-05-09 | Stop reason: SURG

## 2017-05-09 RX ORDER — METOCLOPRAMIDE HYDROCHLORIDE 5 MG/ML
10 INJECTION INTRAMUSCULAR; INTRAVENOUS ONCE AS NEEDED
Status: DISCONTINUED | OUTPATIENT
Start: 2017-05-09 | End: 2017-05-09 | Stop reason: HOSPADM

## 2017-05-09 RX ORDER — SODIUM CHLORIDE, SODIUM LACTATE, POTASSIUM CHLORIDE, CALCIUM CHLORIDE 600; 310; 30; 20 MG/100ML; MG/100ML; MG/100ML; MG/100ML
100 INJECTION, SOLUTION INTRAVENOUS CONTINUOUS
Status: DISCONTINUED | OUTPATIENT
Start: 2017-05-09 | End: 2017-05-09 | Stop reason: HOSPADM

## 2017-05-09 RX ORDER — OXYCODONE HYDROCHLORIDE AND ACETAMINOPHEN 5; 325 MG/1; MG/1
2 TABLET ORAL ONCE
Status: COMPLETED | OUTPATIENT
Start: 2017-05-09 | End: 2017-05-09

## 2017-05-09 RX ORDER — MAGNESIUM HYDROXIDE 1200 MG/15ML
LIQUID ORAL AS NEEDED
Status: DISCONTINUED | OUTPATIENT
Start: 2017-05-09 | End: 2017-05-09 | Stop reason: HOSPADM

## 2017-05-09 RX ORDER — FENTANYL CITRATE 50 UG/ML
INJECTION, SOLUTION INTRAMUSCULAR; INTRAVENOUS AS NEEDED
Status: DISCONTINUED | OUTPATIENT
Start: 2017-05-09 | End: 2017-05-09 | Stop reason: SURG

## 2017-05-09 RX ORDER — BUPIVACAINE HYDROCHLORIDE 2.5 MG/ML
INJECTION, SOLUTION INFILTRATION; PERINEURAL AS NEEDED
Status: DISCONTINUED | OUTPATIENT
Start: 2017-05-09 | End: 2017-05-09 | Stop reason: HOSPADM

## 2017-05-09 RX ORDER — FENTANYL CITRATE/PF 50 MCG/ML
50 SYRINGE (ML) INJECTION
Status: DISCONTINUED | OUTPATIENT
Start: 2017-05-09 | End: 2017-05-09 | Stop reason: HOSPADM

## 2017-05-09 RX ADMIN — SODIUM CHLORIDE, SODIUM LACTATE, POTASSIUM CHLORIDE, AND CALCIUM CHLORIDE: .6; .31; .03; .02 INJECTION, SOLUTION INTRAVENOUS at 07:18

## 2017-05-09 RX ADMIN — FENTANYL CITRATE 50 MCG: 50 INJECTION, SOLUTION INTRAMUSCULAR; INTRAVENOUS at 08:16

## 2017-05-09 RX ADMIN — SODIUM CHLORIDE, SODIUM LACTATE, POTASSIUM CHLORIDE, AND CALCIUM CHLORIDE 100 ML/HR: .6; .31; .03; .02 INJECTION, SOLUTION INTRAVENOUS at 09:16

## 2017-05-09 RX ADMIN — HYDROMORPHONE HYDROCHLORIDE 0.2 MG: 1 INJECTION, SOLUTION INTRAMUSCULAR; INTRAVENOUS; SUBCUTANEOUS at 09:40

## 2017-05-09 RX ADMIN — HYDROMORPHONE HYDROCHLORIDE 0.2 MG: 1 INJECTION, SOLUTION INTRAMUSCULAR; INTRAVENOUS; SUBCUTANEOUS at 09:20

## 2017-05-09 RX ADMIN — HYDROMORPHONE HYDROCHLORIDE 0.2 MG: 1 INJECTION, SOLUTION INTRAMUSCULAR; INTRAVENOUS; SUBCUTANEOUS at 09:36

## 2017-05-09 RX ADMIN — FENTANYL CITRATE 25 MCG: 50 INJECTION, SOLUTION INTRAMUSCULAR; INTRAVENOUS at 08:21

## 2017-05-09 RX ADMIN — MIDAZOLAM HYDROCHLORIDE 2 MG: 1 INJECTION, SOLUTION INTRAMUSCULAR; INTRAVENOUS at 08:14

## 2017-05-09 RX ADMIN — OXYCODONE HYDROCHLORIDE AND ACETAMINOPHEN 2 TABLET: 5; 325 TABLET ORAL at 10:11

## 2017-05-09 RX ADMIN — FENTANYL CITRATE 25 MCG: 50 INJECTION, SOLUTION INTRAMUSCULAR; INTRAVENOUS at 08:32

## 2017-05-09 RX ADMIN — HYDROMORPHONE HYDROCHLORIDE 0.2 MG: 1 INJECTION, SOLUTION INTRAMUSCULAR; INTRAVENOUS; SUBCUTANEOUS at 09:25

## 2017-05-09 RX ADMIN — PROPOFOL 100 MCG/KG/MIN: 10 INJECTION, EMULSION INTRAVENOUS at 08:16

## 2017-05-09 RX ADMIN — FENTANYL CITRATE 50 MCG: 50 INJECTION INTRAMUSCULAR; INTRAVENOUS at 09:06

## 2017-05-09 RX ADMIN — HYDROMORPHONE HYDROCHLORIDE 0.2 MG: 1 INJECTION, SOLUTION INTRAMUSCULAR; INTRAVENOUS; SUBCUTANEOUS at 09:30

## 2017-05-09 RX ADMIN — FENTANYL CITRATE 50 MCG: 50 INJECTION INTRAMUSCULAR; INTRAVENOUS at 09:11

## 2017-05-18 ENCOUNTER — ALLSCRIPTS OFFICE VISIT (OUTPATIENT)
Dept: OTHER | Facility: OTHER | Age: 42
End: 2017-05-18

## 2017-05-22 ENCOUNTER — GENERIC CONVERSION - ENCOUNTER (OUTPATIENT)
Dept: OTHER | Facility: OTHER | Age: 42
End: 2017-05-22

## 2017-05-26 ENCOUNTER — ALLSCRIPTS OFFICE VISIT (OUTPATIENT)
Dept: OTHER | Facility: OTHER | Age: 42
End: 2017-05-26

## 2017-05-30 ENCOUNTER — ALLSCRIPTS OFFICE VISIT (OUTPATIENT)
Dept: OTHER | Facility: OTHER | Age: 42
End: 2017-05-30

## 2017-05-31 ENCOUNTER — ANESTHESIA EVENT (OUTPATIENT)
Dept: PERIOP | Facility: HOSPITAL | Age: 42
End: 2017-05-31
Payer: COMMERCIAL

## 2017-06-01 ENCOUNTER — HOSPITAL ENCOUNTER (OUTPATIENT)
Facility: HOSPITAL | Age: 42
Setting detail: OUTPATIENT SURGERY
Discharge: HOME | End: 2017-06-01
Attending: ANESTHESIOLOGY | Admitting: ANESTHESIOLOGY
Payer: COMMERCIAL

## 2017-06-01 ENCOUNTER — ALLSCRIPTS OFFICE VISIT (OUTPATIENT)
Dept: OTHER | Facility: OTHER | Age: 42
End: 2017-06-01

## 2017-06-01 ENCOUNTER — APPOINTMENT (OUTPATIENT)
Dept: RADIOLOGY | Facility: HOSPITAL | Age: 42
End: 2017-06-01
Payer: COMMERCIAL

## 2017-06-01 ENCOUNTER — ANESTHESIA (OUTPATIENT)
Dept: PERIOP | Facility: HOSPITAL | Age: 42
End: 2017-06-01
Payer: COMMERCIAL

## 2017-06-01 VITALS
SYSTOLIC BLOOD PRESSURE: 122 MMHG | OXYGEN SATURATION: 95 % | RESPIRATION RATE: 15 BRPM | DIASTOLIC BLOOD PRESSURE: 73 MMHG | HEIGHT: 71 IN | HEART RATE: 63 BPM | BODY MASS INDEX: 25.65 KG/M2 | TEMPERATURE: 97.7 F | WEIGHT: 183.2 LBS

## 2017-06-01 PROCEDURE — 72070 X-RAY EXAM THORAC SPINE 2VWS: CPT

## 2017-06-01 RX ORDER — ONDANSETRON 2 MG/ML
4 INJECTION INTRAMUSCULAR; INTRAVENOUS ONCE AS NEEDED
Status: DISCONTINUED | OUTPATIENT
Start: 2017-06-01 | End: 2017-06-01 | Stop reason: HOSPADM

## 2017-06-01 RX ORDER — FENTANYL CITRATE 50 UG/ML
INJECTION, SOLUTION INTRAMUSCULAR; INTRAVENOUS AS NEEDED
Status: DISCONTINUED | OUTPATIENT
Start: 2017-06-01 | End: 2017-06-01 | Stop reason: SURG

## 2017-06-01 RX ORDER — DEXAMETHASONE SODIUM PHOSPHATE 10 MG/ML
INJECTION, SOLUTION INTRAMUSCULAR; INTRAVENOUS AS NEEDED
Status: DISCONTINUED | OUTPATIENT
Start: 2017-06-01 | End: 2017-06-01 | Stop reason: HOSPADM

## 2017-06-01 RX ORDER — MIDAZOLAM HYDROCHLORIDE 1 MG/ML
INJECTION INTRAMUSCULAR; INTRAVENOUS AS NEEDED
Status: DISCONTINUED | OUTPATIENT
Start: 2017-06-01 | End: 2017-06-01 | Stop reason: SURG

## 2017-06-01 RX ORDER — PROPOFOL 10 MG/ML
INJECTION, EMULSION INTRAVENOUS AS NEEDED
Status: DISCONTINUED | OUTPATIENT
Start: 2017-06-01 | End: 2017-06-01 | Stop reason: SURG

## 2017-06-01 RX ORDER — FENTANYL CITRATE/PF 50 MCG/ML
25 SYRINGE (ML) INJECTION
Status: COMPLETED | OUTPATIENT
Start: 2017-06-01 | End: 2017-06-01

## 2017-06-01 RX ORDER — LIDOCAINE HYDROCHLORIDE 20 MG/ML
INJECTION, SOLUTION INFILTRATION; PERINEURAL AS NEEDED
Status: DISCONTINUED | OUTPATIENT
Start: 2017-06-01 | End: 2017-06-01 | Stop reason: HOSPADM

## 2017-06-01 RX ORDER — LIDOCAINE HYDROCHLORIDE 10 MG/ML
INJECTION, SOLUTION INFILTRATION; PERINEURAL AS NEEDED
Status: DISCONTINUED | OUTPATIENT
Start: 2017-06-01 | End: 2017-06-01 | Stop reason: SURG

## 2017-06-01 RX ORDER — MEPERIDINE HYDROCHLORIDE 25 MG/ML
12.5 INJECTION INTRAMUSCULAR; INTRAVENOUS; SUBCUTANEOUS ONCE AS NEEDED
Status: DISCONTINUED | OUTPATIENT
Start: 2017-06-01 | End: 2017-06-01 | Stop reason: HOSPADM

## 2017-06-01 RX ORDER — SODIUM CHLORIDE, SODIUM LACTATE, POTASSIUM CHLORIDE, CALCIUM CHLORIDE 600; 310; 30; 20 MG/100ML; MG/100ML; MG/100ML; MG/100ML
75 INJECTION, SOLUTION INTRAVENOUS CONTINUOUS
Status: DISCONTINUED | OUTPATIENT
Start: 2017-06-01 | End: 2017-06-01

## 2017-06-01 RX ORDER — PROPOFOL 10 MG/ML
INJECTION, EMULSION INTRAVENOUS CONTINUOUS PRN
Status: DISCONTINUED | OUTPATIENT
Start: 2017-06-01 | End: 2017-06-01 | Stop reason: SURG

## 2017-06-01 RX ORDER — MAGNESIUM HYDROXIDE 1200 MG/15ML
LIQUID ORAL AS NEEDED
Status: DISCONTINUED | OUTPATIENT
Start: 2017-06-01 | End: 2017-06-01 | Stop reason: HOSPADM

## 2017-06-01 RX ORDER — BUPIVACAINE HYDROCHLORIDE 2.5 MG/ML
INJECTION, SOLUTION INFILTRATION; PERINEURAL AS NEEDED
Status: DISCONTINUED | OUTPATIENT
Start: 2017-06-01 | End: 2017-06-01 | Stop reason: HOSPADM

## 2017-06-01 RX ADMIN — LIDOCAINE HYDROCHLORIDE 50 MG: 10 INJECTION, SOLUTION INFILTRATION; PERINEURAL at 08:43

## 2017-06-01 RX ADMIN — FENTANYL CITRATE 25 MCG: 50 INJECTION INTRAMUSCULAR; INTRAVENOUS at 09:53

## 2017-06-01 RX ADMIN — SODIUM CHLORIDE, SODIUM LACTATE, POTASSIUM CHLORIDE, AND CALCIUM CHLORIDE: .6; .31; .03; .02 INJECTION, SOLUTION INTRAVENOUS at 08:15

## 2017-06-01 RX ADMIN — FENTANYL CITRATE 25 MCG: 50 INJECTION INTRAMUSCULAR; INTRAVENOUS at 09:46

## 2017-06-01 RX ADMIN — PROPOFOL 50 MG: 10 INJECTION, EMULSION INTRAVENOUS at 08:50

## 2017-06-01 RX ADMIN — FENTANYL CITRATE 25 MCG: 50 INJECTION INTRAMUSCULAR; INTRAVENOUS at 09:37

## 2017-06-01 RX ADMIN — PROPOFOL 50 MG: 10 INJECTION, EMULSION INTRAVENOUS at 09:00

## 2017-06-01 RX ADMIN — FENTANYL CITRATE 25 MCG: 50 INJECTION INTRAMUSCULAR; INTRAVENOUS at 09:42

## 2017-06-01 RX ADMIN — FENTANYL CITRATE 100 MCG: 50 INJECTION, SOLUTION INTRAMUSCULAR; INTRAVENOUS at 08:43

## 2017-06-01 RX ADMIN — PROPOFOL 30 MG: 10 INJECTION, EMULSION INTRAVENOUS at 09:10

## 2017-06-01 RX ADMIN — MIDAZOLAM HYDROCHLORIDE 2 MG: 1 INJECTION, SOLUTION INTRAMUSCULAR; INTRAVENOUS at 08:39

## 2017-06-01 RX ADMIN — PROPOFOL 50 MCG/KG/MIN: 10 INJECTION, EMULSION INTRAVENOUS at 08:43

## 2017-06-02 ENCOUNTER — GENERIC CONVERSION - ENCOUNTER (OUTPATIENT)
Dept: OTHER | Facility: OTHER | Age: 42
End: 2017-06-02

## 2017-06-20 DIAGNOSIS — R79.89 OTHER SPECIFIED ABNORMAL FINDINGS OF BLOOD CHEMISTRY: ICD-10-CM

## 2017-06-20 DIAGNOSIS — K86.1 OTHER CHRONIC PANCREATITIS (HCC): ICD-10-CM

## 2017-06-20 DIAGNOSIS — E78.1 PURE HYPERGLYCERIDEMIA: ICD-10-CM

## 2017-06-27 ENCOUNTER — GENERIC CONVERSION - ENCOUNTER (OUTPATIENT)
Dept: OTHER | Facility: OTHER | Age: 42
End: 2017-06-27

## 2017-06-30 ENCOUNTER — GENERIC CONVERSION - ENCOUNTER (OUTPATIENT)
Dept: OTHER | Facility: OTHER | Age: 42
End: 2017-06-30

## 2017-07-03 ENCOUNTER — ALLSCRIPTS OFFICE VISIT (OUTPATIENT)
Dept: OTHER | Facility: OTHER | Age: 42
End: 2017-07-03

## 2017-07-06 ENCOUNTER — ALLSCRIPTS OFFICE VISIT (OUTPATIENT)
Dept: OTHER | Facility: OTHER | Age: 42
End: 2017-07-06

## 2017-07-13 ENCOUNTER — GENERIC CONVERSION - ENCOUNTER (OUTPATIENT)
Dept: OTHER | Facility: OTHER | Age: 42
End: 2017-07-13

## 2017-07-20 ENCOUNTER — ALLSCRIPTS OFFICE VISIT (OUTPATIENT)
Dept: OTHER | Facility: OTHER | Age: 42
End: 2017-07-20

## 2017-07-21 ENCOUNTER — GENERIC CONVERSION - ENCOUNTER (OUTPATIENT)
Dept: OTHER | Facility: OTHER | Age: 42
End: 2017-07-21

## 2017-07-22 LAB
ALT SERPL W P-5'-P-CCNC: 61 U/L (ref 9–46)
AST SERPL W P-5'-P-CCNC: 30 U/L (ref 10–40)
BILIRUBIN DIRECT (HISTORICAL): 0.1 MG/DL

## 2017-07-26 ENCOUNTER — GENERIC CONVERSION - ENCOUNTER (OUTPATIENT)
Dept: OTHER | Facility: OTHER | Age: 42
End: 2017-07-26

## 2017-08-02 ENCOUNTER — ALLSCRIPTS OFFICE VISIT (OUTPATIENT)
Dept: OTHER | Facility: OTHER | Age: 42
End: 2017-08-02

## 2017-08-08 ENCOUNTER — ANESTHESIA EVENT (OUTPATIENT)
Dept: PERIOP | Facility: HOSPITAL | Age: 42
End: 2017-08-08
Payer: COMMERCIAL

## 2017-08-08 ENCOUNTER — HOSPITAL ENCOUNTER (OUTPATIENT)
Facility: HOSPITAL | Age: 42
Setting detail: OUTPATIENT SURGERY
Discharge: HOME | End: 2017-08-08
Attending: ANESTHESIOLOGY | Admitting: ANESTHESIOLOGY
Payer: COMMERCIAL

## 2017-08-08 ENCOUNTER — ANESTHESIA (OUTPATIENT)
Dept: PERIOP | Facility: HOSPITAL | Age: 42
End: 2017-08-08
Payer: COMMERCIAL

## 2017-08-08 ENCOUNTER — APPOINTMENT (OUTPATIENT)
Dept: RADIOLOGY | Facility: HOSPITAL | Age: 42
End: 2017-08-08
Payer: COMMERCIAL

## 2017-08-08 VITALS
RESPIRATION RATE: 16 BRPM | OXYGEN SATURATION: 96 % | SYSTOLIC BLOOD PRESSURE: 124 MMHG | WEIGHT: 193 LBS | HEART RATE: 66 BPM | DIASTOLIC BLOOD PRESSURE: 79 MMHG | TEMPERATURE: 97 F | HEIGHT: 71 IN | BODY MASS INDEX: 27.02 KG/M2

## 2017-08-08 PROCEDURE — 72070 X-RAY EXAM THORAC SPINE 2VWS: CPT

## 2017-08-08 RX ORDER — DEXAMETHASONE SODIUM PHOSPHATE 10 MG/ML
INJECTION, SOLUTION INTRAMUSCULAR; INTRAVENOUS AS NEEDED
Status: DISCONTINUED | OUTPATIENT
Start: 2017-08-08 | End: 2017-08-08 | Stop reason: HOSPADM

## 2017-08-08 RX ORDER — ONDANSETRON 2 MG/ML
INJECTION INTRAMUSCULAR; INTRAVENOUS AS NEEDED
Status: DISCONTINUED | OUTPATIENT
Start: 2017-08-08 | End: 2017-08-08 | Stop reason: SURG

## 2017-08-08 RX ORDER — LIDOCAINE HYDROCHLORIDE 20 MG/ML
INJECTION, SOLUTION INFILTRATION; PERINEURAL AS NEEDED
Status: DISCONTINUED | OUTPATIENT
Start: 2017-08-08 | End: 2017-08-08 | Stop reason: HOSPADM

## 2017-08-08 RX ORDER — SODIUM CHLORIDE, SODIUM LACTATE, POTASSIUM CHLORIDE, CALCIUM CHLORIDE 600; 310; 30; 20 MG/100ML; MG/100ML; MG/100ML; MG/100ML
100 INJECTION, SOLUTION INTRAVENOUS CONTINUOUS
Status: DISCONTINUED | OUTPATIENT
Start: 2017-08-08 | End: 2017-08-08 | Stop reason: HOSPADM

## 2017-08-08 RX ORDER — MIDAZOLAM HYDROCHLORIDE 1 MG/ML
INJECTION INTRAMUSCULAR; INTRAVENOUS AS NEEDED
Status: DISCONTINUED | OUTPATIENT
Start: 2017-08-08 | End: 2017-08-08 | Stop reason: SURG

## 2017-08-08 RX ORDER — PROPOFOL 10 MG/ML
INJECTION, EMULSION INTRAVENOUS AS NEEDED
Status: DISCONTINUED | OUTPATIENT
Start: 2017-08-08 | End: 2017-08-08 | Stop reason: SURG

## 2017-08-08 RX ORDER — FENTANYL CITRATE 50 UG/ML
INJECTION, SOLUTION INTRAMUSCULAR; INTRAVENOUS AS NEEDED
Status: DISCONTINUED | OUTPATIENT
Start: 2017-08-08 | End: 2017-08-08 | Stop reason: SURG

## 2017-08-08 RX ORDER — GLYCOPYRROLATE 0.2 MG/ML
INJECTION INTRAMUSCULAR; INTRAVENOUS AS NEEDED
Status: DISCONTINUED | OUTPATIENT
Start: 2017-08-08 | End: 2017-08-08 | Stop reason: SURG

## 2017-08-08 RX ORDER — OXYCODONE HYDROCHLORIDE AND ACETAMINOPHEN 5; 325 MG/1; MG/1
1 TABLET ORAL EVERY 4 HOURS PRN
Status: DISCONTINUED | OUTPATIENT
Start: 2017-08-08 | End: 2017-08-08

## 2017-08-08 RX ORDER — OXYCODONE HYDROCHLORIDE AND ACETAMINOPHEN 5; 325 MG/1; MG/1
2 TABLET ORAL EVERY 4 HOURS PRN
Status: DISCONTINUED | OUTPATIENT
Start: 2017-08-08 | End: 2017-08-08 | Stop reason: HOSPADM

## 2017-08-08 RX ORDER — BUPIVACAINE HYDROCHLORIDE 2.5 MG/ML
INJECTION, SOLUTION INFILTRATION; PERINEURAL AS NEEDED
Status: DISCONTINUED | OUTPATIENT
Start: 2017-08-08 | End: 2017-08-08 | Stop reason: HOSPADM

## 2017-08-08 RX ADMIN — ONDANSETRON 4 MG: 2 INJECTION INTRAMUSCULAR; INTRAVENOUS at 08:23

## 2017-08-08 RX ADMIN — SODIUM CHLORIDE, SODIUM LACTATE, POTASSIUM CHLORIDE, AND CALCIUM CHLORIDE 100 ML/HR: .6; .31; .03; .02 INJECTION, SOLUTION INTRAVENOUS at 07:58

## 2017-08-08 RX ADMIN — PROPOFOL 30 MG: 10 INJECTION, EMULSION INTRAVENOUS at 08:29

## 2017-08-08 RX ADMIN — PROPOFOL 30 MG: 10 INJECTION, EMULSION INTRAVENOUS at 08:25

## 2017-08-08 RX ADMIN — PROPOFOL 30 MG: 10 INJECTION, EMULSION INTRAVENOUS at 08:22

## 2017-08-08 RX ADMIN — OXYCODONE HYDROCHLORIDE AND ACETAMINOPHEN 2 TABLET: 5; 325 TABLET ORAL at 09:27

## 2017-08-08 RX ADMIN — FENTANYL CITRATE 50 MCG: 50 INJECTION, SOLUTION INTRAMUSCULAR; INTRAVENOUS at 08:19

## 2017-08-08 RX ADMIN — PROPOFOL 30 MG: 10 INJECTION, EMULSION INTRAVENOUS at 08:37

## 2017-08-08 RX ADMIN — FENTANYL CITRATE 50 MCG: 50 INJECTION, SOLUTION INTRAMUSCULAR; INTRAVENOUS at 08:22

## 2017-08-08 RX ADMIN — GLYCOPYRROLATE 0.2 MG: 0.2 INJECTION, SOLUTION INTRAMUSCULAR; INTRAVENOUS at 08:17

## 2017-08-08 RX ADMIN — MIDAZOLAM HYDROCHLORIDE 2 MG: 1 INJECTION, SOLUTION INTRAMUSCULAR; INTRAVENOUS at 08:18

## 2017-08-08 RX ADMIN — PROPOFOL 30 MG: 10 INJECTION, EMULSION INTRAVENOUS at 08:33

## 2017-08-14 ENCOUNTER — HOSPITAL ENCOUNTER (EMERGENCY)
Facility: HOSPITAL | Age: 42
Discharge: HOME | End: 2017-08-14
Attending: EMERGENCY MEDICINE | Admitting: EMERGENCY MEDICINE
Payer: COMMERCIAL

## 2017-08-14 ENCOUNTER — APPOINTMENT (EMERGENCY)
Dept: CT IMAGING | Facility: HOSPITAL | Age: 42
End: 2017-08-14
Payer: COMMERCIAL

## 2017-08-14 VITALS
WEIGHT: 195.11 LBS | BODY MASS INDEX: 27.21 KG/M2 | RESPIRATION RATE: 19 BRPM | TEMPERATURE: 98.3 F | OXYGEN SATURATION: 98 % | DIASTOLIC BLOOD PRESSURE: 78 MMHG | HEART RATE: 68 BPM | SYSTOLIC BLOOD PRESSURE: 133 MMHG

## 2017-08-14 DIAGNOSIS — G89.29 CHRONIC ABDOMINAL PAIN: Primary | ICD-10-CM

## 2017-08-14 DIAGNOSIS — R10.9 CHRONIC ABDOMINAL PAIN: Primary | ICD-10-CM

## 2017-08-14 LAB
ALBUMIN SERPL BCP-MCNC: 4.3 G/DL (ref 3.5–5)
ALP SERPL-CCNC: 73 U/L (ref 46–116)
ALT SERPL W P-5'-P-CCNC: 39 U/L (ref 12–78)
ANION GAP SERPL CALCULATED.3IONS-SCNC: 8 MMOL/L (ref 4–13)
AST SERPL W P-5'-P-CCNC: 25 U/L (ref 5–45)
BASOPHILS # BLD AUTO: 0.05 THOUSANDS/ΜL (ref 0–0.1)
BASOPHILS NFR BLD AUTO: 1 % (ref 0–1)
BILIRUB SERPL-MCNC: 0.4 MG/DL (ref 0.2–1)
BUN SERPL-MCNC: 16 MG/DL (ref 5–25)
CALCIUM SERPL-MCNC: 9.6 MG/DL (ref 8.3–10.1)
CHLORIDE SERPL-SCNC: 104 MMOL/L (ref 100–108)
CO2 SERPL-SCNC: 27 MMOL/L (ref 21–32)
CREAT SERPL-MCNC: 0.78 MG/DL (ref 0.6–1.3)
EOSINOPHIL # BLD AUTO: 0.15 THOUSAND/ΜL (ref 0–0.61)
EOSINOPHIL NFR BLD AUTO: 2 % (ref 0–6)
ERYTHROCYTE [DISTWIDTH] IN BLOOD BY AUTOMATED COUNT: 12.7 % (ref 11.6–15.1)
GFR SERPL CREATININE-BSD FRML MDRD: 110 ML/MIN/1.73SQ M
GLUCOSE SERPL-MCNC: 98 MG/DL (ref 65–140)
HCT VFR BLD AUTO: 46 % (ref 36.5–49.3)
HGB BLD-MCNC: 15.5 G/DL (ref 12–17)
INR PPP: 0.85 (ref 0.86–1.16)
LACTATE SERPL-SCNC: 0.5 MMOL/L (ref 0.5–2)
LIPASE SERPL-CCNC: 169 U/L (ref 73–393)
LYMPHOCYTES # BLD AUTO: 2.74 THOUSANDS/ΜL (ref 0.6–4.47)
LYMPHOCYTES NFR BLD AUTO: 29 % (ref 14–44)
MCH RBC QN AUTO: 31.5 PG (ref 26.8–34.3)
MCHC RBC AUTO-ENTMCNC: 33.7 G/DL (ref 31.4–37.4)
MCV RBC AUTO: 94 FL (ref 82–98)
MONOCYTES # BLD AUTO: 0.61 THOUSAND/ΜL (ref 0.17–1.22)
MONOCYTES NFR BLD AUTO: 7 % (ref 4–12)
NEUTROPHILS # BLD AUTO: 5.66 THOUSANDS/ΜL (ref 1.85–7.62)
NEUTS SEG NFR BLD AUTO: 61 % (ref 43–75)
NRBC BLD AUTO-RTO: 0 /100 WBCS
PLATELET # BLD AUTO: 190 THOUSANDS/UL (ref 149–390)
PMV BLD AUTO: 10.3 FL (ref 8.9–12.7)
POTASSIUM SERPL-SCNC: 4.3 MMOL/L (ref 3.5–5.3)
PROT SERPL-MCNC: 7.7 G/DL (ref 6.4–8.2)
PROTHROMBIN TIME: 11.8 SECONDS (ref 12.1–14.4)
RBC # BLD AUTO: 4.92 MILLION/UL (ref 3.88–5.62)
SODIUM SERPL-SCNC: 139 MMOL/L (ref 136–145)
TSH SERPL DL<=0.05 MIU/L-ACNC: 1.19 UIU/ML (ref 0.36–3.74)
WBC # BLD AUTO: 9.32 THOUSAND/UL (ref 4.31–10.16)

## 2017-08-14 PROCEDURE — 83690 ASSAY OF LIPASE: CPT | Performed by: EMERGENCY MEDICINE

## 2017-08-14 PROCEDURE — 36415 COLL VENOUS BLD VENIPUNCTURE: CPT | Performed by: EMERGENCY MEDICINE

## 2017-08-14 PROCEDURE — 74177 CT ABD & PELVIS W/CONTRAST: CPT

## 2017-08-14 PROCEDURE — 85025 COMPLETE CBC W/AUTO DIFF WBC: CPT | Performed by: EMERGENCY MEDICINE

## 2017-08-14 PROCEDURE — 83605 ASSAY OF LACTIC ACID: CPT | Performed by: EMERGENCY MEDICINE

## 2017-08-14 PROCEDURE — 84443 ASSAY THYROID STIM HORMONE: CPT | Performed by: EMERGENCY MEDICINE

## 2017-08-14 PROCEDURE — 80053 COMPREHEN METABOLIC PANEL: CPT | Performed by: EMERGENCY MEDICINE

## 2017-08-14 PROCEDURE — 99284 EMERGENCY DEPT VISIT MOD MDM: CPT

## 2017-08-14 PROCEDURE — 85610 PROTHROMBIN TIME: CPT | Performed by: EMERGENCY MEDICINE

## 2017-08-14 RX ADMIN — IOHEXOL 100 ML: 350 INJECTION, SOLUTION INTRAVENOUS at 19:13

## 2017-08-15 ENCOUNTER — GENERIC CONVERSION - ENCOUNTER (OUTPATIENT)
Dept: OTHER | Facility: OTHER | Age: 42
End: 2017-08-15

## 2017-08-16 ENCOUNTER — ALLSCRIPTS OFFICE VISIT (OUTPATIENT)
Dept: OTHER | Facility: OTHER | Age: 42
End: 2017-08-16

## 2017-08-18 ENCOUNTER — GENERIC CONVERSION - ENCOUNTER (OUTPATIENT)
Dept: OTHER | Facility: OTHER | Age: 42
End: 2017-08-18

## 2017-08-22 ENCOUNTER — GENERIC CONVERSION - ENCOUNTER (OUTPATIENT)
Dept: OTHER | Facility: OTHER | Age: 42
End: 2017-08-22

## 2017-09-05 ENCOUNTER — ALLSCRIPTS OFFICE VISIT (OUTPATIENT)
Dept: OTHER | Facility: OTHER | Age: 42
End: 2017-09-05

## 2017-09-15 ENCOUNTER — GENERIC CONVERSION - ENCOUNTER (OUTPATIENT)
Dept: OTHER | Facility: OTHER | Age: 42
End: 2017-09-15

## 2017-09-19 ENCOUNTER — GENERIC CONVERSION - ENCOUNTER (OUTPATIENT)
Dept: OTHER | Facility: OTHER | Age: 42
End: 2017-09-19

## 2017-11-15 ENCOUNTER — GENERIC CONVERSION - ENCOUNTER (OUTPATIENT)
Dept: OTHER | Facility: OTHER | Age: 42
End: 2017-11-15

## 2017-11-15 ENCOUNTER — HOSPITAL ENCOUNTER (INPATIENT)
Facility: HOSPITAL | Age: 42
LOS: 2 days | Discharge: HOME | DRG: 392 | End: 2017-11-18
Attending: EMERGENCY MEDICINE | Admitting: INTERNAL MEDICINE
Payer: COMMERCIAL

## 2017-11-15 ENCOUNTER — APPOINTMENT (EMERGENCY)
Dept: CT IMAGING | Facility: HOSPITAL | Age: 42
DRG: 392 | End: 2017-11-15
Payer: COMMERCIAL

## 2017-11-15 DIAGNOSIS — R07.9 CHEST PAIN: ICD-10-CM

## 2017-11-15 DIAGNOSIS — K86.1 CHRONIC PANCREATITIS (HCC): ICD-10-CM

## 2017-11-15 DIAGNOSIS — R10.13 EPIGASTRIC PAIN: Primary | ICD-10-CM

## 2017-11-15 DIAGNOSIS — K29.00 ACUTE GASTRITIS WITHOUT HEMORRHAGE, UNSPECIFIED GASTRITIS TYPE: ICD-10-CM

## 2017-11-15 DIAGNOSIS — K86.9 PANCREATIC LESION: ICD-10-CM

## 2017-11-15 LAB
ALBUMIN SERPL BCP-MCNC: 4.4 G/DL (ref 3.5–5)
ALP SERPL-CCNC: 70 U/L (ref 46–116)
ALT SERPL W P-5'-P-CCNC: 35 U/L (ref 12–78)
ANION GAP SERPL CALCULATED.3IONS-SCNC: 11 MMOL/L (ref 4–13)
AST SERPL W P-5'-P-CCNC: 13 U/L (ref 5–45)
BASOPHILS # BLD AUTO: 0.03 THOUSANDS/ΜL (ref 0–0.1)
BASOPHILS NFR BLD AUTO: 0 % (ref 0–1)
BILIRUB SERPL-MCNC: 0.6 MG/DL (ref 0.2–1)
BUN SERPL-MCNC: 11 MG/DL (ref 5–25)
CALCIUM SERPL-MCNC: 9.7 MG/DL (ref 8.3–10.1)
CHLORIDE SERPL-SCNC: 101 MMOL/L (ref 100–108)
CO2 SERPL-SCNC: 28 MMOL/L (ref 21–32)
CREAT SERPL-MCNC: 0.96 MG/DL (ref 0.6–1.3)
DEPRECATED D DIMER PPP: 466 NG/ML (FEU) (ref 0–424)
EOSINOPHIL # BLD AUTO: 0.12 THOUSAND/ΜL (ref 0–0.61)
EOSINOPHIL NFR BLD AUTO: 1 % (ref 0–6)
ERYTHROCYTE [DISTWIDTH] IN BLOOD BY AUTOMATED COUNT: 12.2 % (ref 11.6–15.1)
GFR SERPL CREATININE-BSD FRML MDRD: 96 ML/MIN/1.73SQ M
GLUCOSE SERPL-MCNC: 113 MG/DL (ref 65–140)
HCT VFR BLD AUTO: 45.4 % (ref 36.5–49.3)
HGB BLD-MCNC: 15.2 G/DL (ref 12–17)
LACTATE SERPL-SCNC: 1.1 MMOL/L (ref 0.5–2)
LIPASE SERPL-CCNC: 335 U/L (ref 73–393)
LYMPHOCYTES # BLD AUTO: 1.21 THOUSANDS/ΜL (ref 0.6–4.47)
LYMPHOCYTES NFR BLD AUTO: 10 % (ref 14–44)
MCH RBC QN AUTO: 31.5 PG (ref 26.8–34.3)
MCHC RBC AUTO-ENTMCNC: 33.5 G/DL (ref 31.4–37.4)
MCV RBC AUTO: 94 FL (ref 82–98)
MONOCYTES # BLD AUTO: 0.89 THOUSAND/ΜL (ref 0.17–1.22)
MONOCYTES NFR BLD AUTO: 8 % (ref 4–12)
NEUTROPHILS # BLD AUTO: 9.63 THOUSANDS/ΜL (ref 1.85–7.62)
NEUTS SEG NFR BLD AUTO: 81 % (ref 43–75)
NRBC BLD AUTO-RTO: 0 /100 WBCS
PLATELET # BLD AUTO: 183 THOUSANDS/UL (ref 149–390)
PMV BLD AUTO: 10.3 FL (ref 8.9–12.7)
POTASSIUM SERPL-SCNC: 3.7 MMOL/L (ref 3.5–5.3)
PROT SERPL-MCNC: 7.9 G/DL (ref 6.4–8.2)
RBC # BLD AUTO: 4.82 MILLION/UL (ref 3.88–5.62)
SODIUM SERPL-SCNC: 140 MMOL/L (ref 136–145)
TROPONIN I SERPL-MCNC: <0.02 NG/ML
WBC # BLD AUTO: 11.93 THOUSAND/UL (ref 4.31–10.16)

## 2017-11-15 PROCEDURE — 36415 COLL VENOUS BLD VENIPUNCTURE: CPT | Performed by: EMERGENCY MEDICINE

## 2017-11-15 PROCEDURE — 71275 CT ANGIOGRAPHY CHEST: CPT

## 2017-11-15 PROCEDURE — 80053 COMPREHEN METABOLIC PANEL: CPT | Performed by: EMERGENCY MEDICINE

## 2017-11-15 PROCEDURE — 83605 ASSAY OF LACTIC ACID: CPT | Performed by: EMERGENCY MEDICINE

## 2017-11-15 PROCEDURE — 85025 COMPLETE CBC W/AUTO DIFF WBC: CPT | Performed by: EMERGENCY MEDICINE

## 2017-11-15 PROCEDURE — 96361 HYDRATE IV INFUSION ADD-ON: CPT

## 2017-11-15 PROCEDURE — 83690 ASSAY OF LIPASE: CPT | Performed by: EMERGENCY MEDICINE

## 2017-11-15 PROCEDURE — 74177 CT ABD & PELVIS W/CONTRAST: CPT

## 2017-11-15 PROCEDURE — 84484 ASSAY OF TROPONIN QUANT: CPT | Performed by: EMERGENCY MEDICINE

## 2017-11-15 PROCEDURE — 93005 ELECTROCARDIOGRAM TRACING: CPT | Performed by: EMERGENCY MEDICINE

## 2017-11-15 PROCEDURE — 85379 FIBRIN DEGRADATION QUANT: CPT | Performed by: EMERGENCY MEDICINE

## 2017-11-15 PROCEDURE — 96374 THER/PROPH/DIAG INJ IV PUSH: CPT

## 2017-11-15 RX ORDER — KETOROLAC TROMETHAMINE 30 MG/ML
15 INJECTION, SOLUTION INTRAMUSCULAR; INTRAVENOUS ONCE
Status: COMPLETED | OUTPATIENT
Start: 2017-11-15 | End: 2017-11-15

## 2017-11-15 RX ORDER — ASPIRIN 81 MG/1
162 TABLET, CHEWABLE ORAL ONCE
Status: COMPLETED | OUTPATIENT
Start: 2017-11-15 | End: 2017-11-15

## 2017-11-15 RX ADMIN — IOHEXOL 100 ML: 350 INJECTION, SOLUTION INTRAVENOUS at 23:10

## 2017-11-15 RX ADMIN — KETOROLAC TROMETHAMINE 15 MG: 30 INJECTION, SOLUTION INTRAMUSCULAR at 22:31

## 2017-11-15 RX ADMIN — ASPIRIN 81 MG 162 MG: 81 TABLET ORAL at 22:31

## 2017-11-15 RX ADMIN — SODIUM CHLORIDE 1000 ML: 0.9 INJECTION, SOLUTION INTRAVENOUS at 22:33

## 2017-11-16 ENCOUNTER — ANESTHESIA EVENT (INPATIENT)
Dept: PERIOP | Facility: HOSPITAL | Age: 42
DRG: 392 | End: 2017-11-16
Payer: COMMERCIAL

## 2017-11-16 ENCOUNTER — ANESTHESIA (INPATIENT)
Dept: PERIOP | Facility: HOSPITAL | Age: 42
DRG: 392 | End: 2017-11-16
Payer: COMMERCIAL

## 2017-11-16 PROBLEM — K29.00 ACUTE GASTRITIS WITHOUT HEMORRHAGE: Status: ACTIVE | Noted: 2017-11-15

## 2017-11-16 PROBLEM — I45.10 RBBB: Status: ACTIVE | Noted: 2017-11-16

## 2017-11-16 PROBLEM — D72.829 LEUKOCYTOSIS: Status: ACTIVE | Noted: 2017-11-16

## 2017-11-16 PROBLEM — R10.13 EPIGASTRIC ABDOMINAL PAIN: Status: ACTIVE | Noted: 2017-11-16

## 2017-11-16 PROBLEM — K86.9 PANCREATIC LESION: Status: ACTIVE | Noted: 2017-02-14

## 2017-11-16 PROBLEM — E78.5 HLD (HYPERLIPIDEMIA): Status: ACTIVE | Noted: 2017-11-16

## 2017-11-16 PROBLEM — R10.13 EPIGASTRIC PAIN: Status: ACTIVE | Noted: 2017-11-15

## 2017-11-16 PROBLEM — R79.89 POSITIVE D DIMER: Status: ACTIVE | Noted: 2017-11-16

## 2017-11-16 PROBLEM — R07.9 CHEST PAIN: Status: ACTIVE | Noted: 2017-11-16

## 2017-11-16 LAB
AMYLASE SERPL-CCNC: 54 IU/L (ref 25–115)
ANION GAP SERPL CALCULATED.3IONS-SCNC: 6 MMOL/L (ref 4–13)
APTT PPP: 30 SECONDS (ref 23–35)
ATRIAL RATE: 99 BPM
BUN SERPL-MCNC: 11 MG/DL (ref 5–25)
CALCIUM SERPL-MCNC: 8.5 MG/DL (ref 8.3–10.1)
CHLORIDE SERPL-SCNC: 106 MMOL/L (ref 100–108)
CHOLEST SERPL-MCNC: 73 MG/DL (ref 50–200)
CO2 SERPL-SCNC: 28 MMOL/L (ref 21–32)
CREAT SERPL-MCNC: 0.89 MG/DL (ref 0.6–1.3)
ERYTHROCYTE [DISTWIDTH] IN BLOOD BY AUTOMATED COUNT: 12.3 % (ref 11.6–15.1)
GFR SERPL CREATININE-BSD FRML MDRD: 104 ML/MIN/1.73SQ M
GLUCOSE SERPL-MCNC: 103 MG/DL (ref 65–140)
HCT VFR BLD AUTO: 38.2 % (ref 36.5–49.3)
HDLC SERPL-MCNC: 30 MG/DL (ref 40–60)
HGB BLD-MCNC: 12.9 G/DL (ref 12–17)
INR PPP: 1.12 (ref 0.86–1.16)
LDLC SERPL CALC-MCNC: 11 MG/DL (ref 0–100)
MAGNESIUM SERPL-MCNC: 1.8 MG/DL (ref 1.6–2.6)
MCH RBC QN AUTO: 31.9 PG (ref 26.8–34.3)
MCHC RBC AUTO-ENTMCNC: 33.8 G/DL (ref 31.4–37.4)
MCV RBC AUTO: 94 FL (ref 82–98)
P AXIS: 50 DEGREES
PLATELET # BLD AUTO: 140 THOUSANDS/UL (ref 149–390)
PLATELET # BLD AUTO: 140 THOUSANDS/UL (ref 149–390)
PMV BLD AUTO: 10.2 FL (ref 8.9–12.7)
PMV BLD AUTO: 10.2 FL (ref 8.9–12.7)
POTASSIUM SERPL-SCNC: 4.1 MMOL/L (ref 3.5–5.3)
PR INTERVAL: 130 MS
PROTHROMBIN TIME: 14.6 SECONDS (ref 12.1–14.4)
QRS AXIS: 61 DEGREES
QRSD INTERVAL: 108 MS
QT INTERVAL: 336 MS
QTC INTERVAL: 431 MS
RBC # BLD AUTO: 4.05 MILLION/UL (ref 3.88–5.62)
SODIUM SERPL-SCNC: 140 MMOL/L (ref 136–145)
T WAVE AXIS: 46 DEGREES
TRIGL SERPL-MCNC: 159 MG/DL
TROPONIN I SERPL-MCNC: <0.02 NG/ML
TSH SERPL DL<=0.05 MIU/L-ACNC: 1.77 UIU/ML (ref 0.36–3.74)
VENTRICULAR RATE: 99 BPM
WBC # BLD AUTO: 6.06 THOUSAND/UL (ref 4.31–10.16)

## 2017-11-16 PROCEDURE — 84484 ASSAY OF TROPONIN QUANT: CPT | Performed by: PHYSICIAN ASSISTANT

## 2017-11-16 PROCEDURE — 83735 ASSAY OF MAGNESIUM: CPT | Performed by: PHYSICIAN ASSISTANT

## 2017-11-16 PROCEDURE — 84443 ASSAY THYROID STIM HORMONE: CPT | Performed by: PHYSICIAN ASSISTANT

## 2017-11-16 PROCEDURE — 80048 BASIC METABOLIC PNL TOTAL CA: CPT | Performed by: PHYSICIAN ASSISTANT

## 2017-11-16 PROCEDURE — 0DB68ZX EXCISION OF STOMACH, VIA NATURAL OR ARTIFICIAL OPENING ENDOSCOPIC, DIAGNOSTIC: ICD-10-PCS | Performed by: INTERNAL MEDICINE

## 2017-11-16 PROCEDURE — 85049 AUTOMATED PLATELET COUNT: CPT | Performed by: PHYSICIAN ASSISTANT

## 2017-11-16 PROCEDURE — 85730 THROMBOPLASTIN TIME PARTIAL: CPT | Performed by: PHYSICIAN ASSISTANT

## 2017-11-16 PROCEDURE — 80061 LIPID PANEL: CPT | Performed by: PHYSICIAN ASSISTANT

## 2017-11-16 PROCEDURE — 88342 IMHCHEM/IMCYTCHM 1ST ANTB: CPT | Performed by: INTERNAL MEDICINE

## 2017-11-16 PROCEDURE — 99285 EMERGENCY DEPT VISIT HI MDM: CPT

## 2017-11-16 PROCEDURE — C9113 INJ PANTOPRAZOLE SODIUM, VIA: HCPCS | Performed by: PHYSICIAN ASSISTANT

## 2017-11-16 PROCEDURE — 36415 COLL VENOUS BLD VENIPUNCTURE: CPT | Performed by: PHYSICIAN ASSISTANT

## 2017-11-16 PROCEDURE — 82150 ASSAY OF AMYLASE: CPT | Performed by: PHYSICIAN ASSISTANT

## 2017-11-16 PROCEDURE — 88305 TISSUE EXAM BY PATHOLOGIST: CPT | Performed by: INTERNAL MEDICINE

## 2017-11-16 PROCEDURE — 85610 PROTHROMBIN TIME: CPT | Performed by: PHYSICIAN ASSISTANT

## 2017-11-16 PROCEDURE — 0DB38ZX EXCISION OF LOWER ESOPHAGUS, VIA NATURAL OR ARTIFICIAL OPENING ENDOSCOPIC, DIAGNOSTIC: ICD-10-PCS | Performed by: INTERNAL MEDICINE

## 2017-11-16 PROCEDURE — 96375 TX/PRO/DX INJ NEW DRUG ADDON: CPT

## 2017-11-16 PROCEDURE — 85027 COMPLETE CBC AUTOMATED: CPT | Performed by: PHYSICIAN ASSISTANT

## 2017-11-16 RX ORDER — ATORVASTATIN CALCIUM 40 MG/1
40 TABLET, FILM COATED ORAL
Status: DISCONTINUED | OUTPATIENT
Start: 2017-11-16 | End: 2017-11-18 | Stop reason: HOSPADM

## 2017-11-16 RX ORDER — OXYCODONE HYDROCHLORIDE 5 MG/1
5 TABLET ORAL EVERY 4 HOURS PRN
Status: DISCONTINUED | OUTPATIENT
Start: 2017-11-16 | End: 2017-11-18 | Stop reason: HOSPADM

## 2017-11-16 RX ORDER — PANTOPRAZOLE SODIUM 40 MG/1
GRANULE, DELAYED RELEASE ORAL
COMMUNITY
End: 2018-04-20 | Stop reason: HOSPADM

## 2017-11-16 RX ORDER — SODIUM CHLORIDE, SODIUM GLUCONATE, SODIUM ACETATE, POTASSIUM CHLORIDE, MAGNESIUM CHLORIDE, SODIUM PHOSPHATE, DIBASIC, AND POTASSIUM PHOSPHATE .53; .5; .37; .037; .03; .012; .00082 G/100ML; G/100ML; G/100ML; G/100ML; G/100ML; G/100ML; G/100ML
125 INJECTION, SOLUTION INTRAVENOUS CONTINUOUS
Status: DISCONTINUED | OUTPATIENT
Start: 2017-11-16 | End: 2017-11-16

## 2017-11-16 RX ORDER — NIACIN 500 MG/1
500 TABLET, EXTENDED RELEASE ORAL
Status: DISCONTINUED | OUTPATIENT
Start: 2017-11-16 | End: 2017-11-18 | Stop reason: HOSPADM

## 2017-11-16 RX ORDER — ONDANSETRON 2 MG/ML
4 INJECTION INTRAMUSCULAR; INTRAVENOUS ONCE AS NEEDED
Status: COMPLETED | OUTPATIENT
Start: 2017-11-16 | End: 2017-11-16

## 2017-11-16 RX ORDER — PROPOFOL 10 MG/ML
INJECTION, EMULSION INTRAVENOUS AS NEEDED
Status: DISCONTINUED | OUTPATIENT
Start: 2017-11-16 | End: 2017-11-16 | Stop reason: SURG

## 2017-11-16 RX ORDER — SODIUM CHLORIDE, SODIUM LACTATE, POTASSIUM CHLORIDE, CALCIUM CHLORIDE 600; 310; 30; 20 MG/100ML; MG/100ML; MG/100ML; MG/100ML
INJECTION, SOLUTION INTRAVENOUS CONTINUOUS PRN
Status: DISCONTINUED | OUTPATIENT
Start: 2017-11-16 | End: 2017-11-16 | Stop reason: SURG

## 2017-11-16 RX ORDER — ONDANSETRON 2 MG/ML
4 INJECTION INTRAMUSCULAR; INTRAVENOUS EVERY 6 HOURS PRN
Status: DISCONTINUED | OUTPATIENT
Start: 2017-11-16 | End: 2017-11-18 | Stop reason: HOSPADM

## 2017-11-16 RX ORDER — ONDANSETRON 4 MG/1
TABLET, FILM COATED ORAL
COMMUNITY
Start: 2017-07-20 | End: 2018-02-14 | Stop reason: SDUPTHER

## 2017-11-16 RX ORDER — ALBUTEROL SULFATE 90 UG/1
1 AEROSOL, METERED RESPIRATORY (INHALATION) EVERY 4 HOURS PRN
Status: DISCONTINUED | OUTPATIENT
Start: 2017-11-16 | End: 2017-11-18 | Stop reason: HOSPADM

## 2017-11-16 RX ORDER — LANOLIN ALCOHOL/MO/W.PET/CERES
500 CREAM (GRAM) TOPICAL
Status: DISCONTINUED | OUTPATIENT
Start: 2017-11-16 | End: 2017-11-16

## 2017-11-16 RX ORDER — NIACIN 100 MG
50 TABLET ORAL
Status: DISCONTINUED | OUTPATIENT
Start: 2017-11-16 | End: 2017-11-16 | Stop reason: DRUGHIGH

## 2017-11-16 RX ORDER — EPINEPHRINE 0.3 MG/.3ML
INJECTION INTRAMUSCULAR
COMMUNITY
End: 2020-06-23

## 2017-11-16 RX ORDER — TRAZODONE HYDROCHLORIDE 50 MG/1
TABLET ORAL
COMMUNITY
Start: 2017-03-09 | End: 2017-11-18 | Stop reason: HOSPADM

## 2017-11-16 RX ORDER — ENALAPRILAT 2.5 MG/2ML
0.62 INJECTION INTRAVENOUS EVERY 6 HOURS PRN
Status: DISCONTINUED | OUTPATIENT
Start: 2017-11-16 | End: 2017-11-18 | Stop reason: HOSPADM

## 2017-11-16 RX ORDER — CHLORZOXAZONE 750 MG/1
750 TABLET ORAL 2 TIMES DAILY
COMMUNITY
End: 2018-04-20 | Stop reason: HOSPADM

## 2017-11-16 RX ORDER — CALCIUM CARBONATE 200(500)MG
1000 TABLET,CHEWABLE ORAL DAILY PRN
Status: DISCONTINUED | OUTPATIENT
Start: 2017-11-16 | End: 2017-11-18 | Stop reason: HOSPADM

## 2017-11-16 RX ORDER — LIDOCAINE HYDROCHLORIDE 10 MG/ML
INJECTION, SOLUTION INFILTRATION; PERINEURAL AS NEEDED
Status: DISCONTINUED | OUTPATIENT
Start: 2017-11-16 | End: 2017-11-16 | Stop reason: SURG

## 2017-11-16 RX ORDER — ACETAMINOPHEN 325 MG/1
650 TABLET ORAL EVERY 6 HOURS PRN
Status: DISCONTINUED | OUTPATIENT
Start: 2017-11-16 | End: 2017-11-18 | Stop reason: HOSPADM

## 2017-11-16 RX ORDER — ALBUTEROL SULFATE 90 UG/1
AEROSOL, METERED RESPIRATORY (INHALATION)
COMMUNITY
Start: 2016-12-15 | End: 2017-11-18 | Stop reason: HOSPADM

## 2017-11-16 RX ORDER — SODIUM CHLORIDE 9 MG/ML
125 INJECTION, SOLUTION INTRAVENOUS CONTINUOUS
Status: DISCONTINUED | OUTPATIENT
Start: 2017-11-16 | End: 2017-11-16

## 2017-11-16 RX ORDER — ASPIRIN 325 MG
325 TABLET ORAL DAILY
Status: DISCONTINUED | OUTPATIENT
Start: 2017-11-16 | End: 2017-11-18 | Stop reason: HOSPADM

## 2017-11-16 RX ORDER — TRAZODONE HYDROCHLORIDE 50 MG/1
50 TABLET ORAL
Status: DISCONTINUED | OUTPATIENT
Start: 2017-11-16 | End: 2017-11-18 | Stop reason: HOSPADM

## 2017-11-16 RX ORDER — ROSUVASTATIN CALCIUM 20 MG/1
TABLET, COATED ORAL
COMMUNITY
End: 2017-11-18 | Stop reason: HOSPADM

## 2017-11-16 RX ORDER — PANTOPRAZOLE SODIUM 40 MG/1
40 INJECTION, POWDER, FOR SOLUTION INTRAVENOUS EVERY 12 HOURS SCHEDULED
Status: DISCONTINUED | OUTPATIENT
Start: 2017-11-16 | End: 2017-11-18

## 2017-11-16 RX ORDER — NITROGLYCERIN 0.4 MG/1
0.4 TABLET SUBLINGUAL
Status: DISCONTINUED | OUTPATIENT
Start: 2017-11-16 | End: 2017-11-18 | Stop reason: HOSPADM

## 2017-11-16 RX ORDER — NICOTINE 21 MG/24HR
1 PATCH, TRANSDERMAL 24 HOURS TRANSDERMAL DAILY
Status: DISCONTINUED | OUTPATIENT
Start: 2017-11-16 | End: 2017-11-18 | Stop reason: HOSPADM

## 2017-11-16 RX ADMIN — HYDROMORPHONE HYDROCHLORIDE 1 MG: 1 INJECTION, SOLUTION INTRAMUSCULAR; INTRAVENOUS; SUBCUTANEOUS at 18:15

## 2017-11-16 RX ADMIN — PROPOFOL 150 MG: 10 INJECTION, EMULSION INTRAVENOUS at 14:04

## 2017-11-16 RX ADMIN — HYDROMORPHONE HYDROCHLORIDE 1 MG: 1 INJECTION, SOLUTION INTRAMUSCULAR; INTRAVENOUS; SUBCUTANEOUS at 06:14

## 2017-11-16 RX ADMIN — NICOTINE 1 PATCH: 14 PATCH TRANSDERMAL at 09:13

## 2017-11-16 RX ADMIN — TRAZODONE HYDROCHLORIDE 50 MG: 50 TABLET ORAL at 21:40

## 2017-11-16 RX ADMIN — PANCRELIPASE 12000 UNITS: 30000; 6000; 19000 CAPSULE, DELAYED RELEASE PELLETS ORAL at 08:30

## 2017-11-16 RX ADMIN — PANTOPRAZOLE SODIUM 40 MG: 40 INJECTION, POWDER, FOR SOLUTION INTRAVENOUS at 21:41

## 2017-11-16 RX ADMIN — ONDANSETRON 4 MG: 2 INJECTION INTRAMUSCULAR; INTRAVENOUS at 00:28

## 2017-11-16 RX ADMIN — PANCRELIPASE 12000 UNITS: 30000; 6000; 19000 CAPSULE, DELAYED RELEASE PELLETS ORAL at 17:02

## 2017-11-16 RX ADMIN — NIACIN 500 MG: 500 TABLET, FILM COATED, EXTENDED RELEASE ORAL at 10:30

## 2017-11-16 RX ADMIN — ATORVASTATIN CALCIUM 40 MG: 40 TABLET, FILM COATED ORAL at 17:01

## 2017-11-16 RX ADMIN — HYDROMORPHONE HYDROCHLORIDE 1 MG: 1 INJECTION, SOLUTION INTRAMUSCULAR; INTRAVENOUS; SUBCUTANEOUS at 02:45

## 2017-11-16 RX ADMIN — SODIUM CHLORIDE, SODIUM GLUCONATE, SODIUM ACETATE, POTASSIUM CHLORIDE AND MAGNESIUM CHLORIDE 125 ML/HR: 526; 502; 368; 37; 30 INJECTION, SOLUTION INTRAVENOUS at 00:51

## 2017-11-16 RX ADMIN — HYDROMORPHONE HYDROCHLORIDE 1 MG: 1 INJECTION, SOLUTION INTRAMUSCULAR; INTRAVENOUS; SUBCUTANEOUS at 15:17

## 2017-11-16 RX ADMIN — Medication 50 MG: at 08:30

## 2017-11-16 RX ADMIN — ASPIRIN 325 MG: 325 TABLET ORAL at 09:13

## 2017-11-16 RX ADMIN — HYDROMORPHONE HYDROCHLORIDE 1 MG: 1 INJECTION, SOLUTION INTRAMUSCULAR; INTRAVENOUS; SUBCUTANEOUS at 00:28

## 2017-11-16 RX ADMIN — PANTOPRAZOLE SODIUM 40 MG: 40 INJECTION, POWDER, FOR SOLUTION INTRAVENOUS at 09:13

## 2017-11-16 RX ADMIN — PANCRELIPASE 12000 UNITS: 30000; 6000; 19000 CAPSULE, DELAYED RELEASE PELLETS ORAL at 12:11

## 2017-11-16 RX ADMIN — HYDROMORPHONE HYDROCHLORIDE 1 MG: 1 INJECTION, SOLUTION INTRAMUSCULAR; INTRAVENOUS; SUBCUTANEOUS at 21:41

## 2017-11-16 RX ADMIN — SODIUM CHLORIDE, SODIUM LACTATE, POTASSIUM CHLORIDE, AND CALCIUM CHLORIDE: .6; .31; .03; .02 INJECTION, SOLUTION INTRAVENOUS at 14:00

## 2017-11-16 RX ADMIN — HYDROMORPHONE HYDROCHLORIDE 1 MG: 1 INJECTION, SOLUTION INTRAMUSCULAR; INTRAVENOUS; SUBCUTANEOUS at 09:14

## 2017-11-16 RX ADMIN — LIDOCAINE HYDROCHLORIDE 50 MG: 10 INJECTION, SOLUTION INFILTRATION; PERINEURAL at 14:03

## 2017-11-16 RX ADMIN — HYDROMORPHONE HYDROCHLORIDE 1 MG: 1 INJECTION, SOLUTION INTRAMUSCULAR; INTRAVENOUS; SUBCUTANEOUS at 12:18

## 2017-11-16 RX ADMIN — ENOXAPARIN SODIUM 40 MG: 40 INJECTION SUBCUTANEOUS at 09:14

## 2017-11-16 NOTE — ED NOTES
Called pharmacy to let them know that the niacin order was supposed to be 500 mg not 50 mg; changed order  Gave pt 50 mg so far  Pharmacist will send down additional dose (450mg)        Rosalio Lo RN  11/16/17 1447

## 2017-11-16 NOTE — ED NOTES
Pt is complaining about pain again, notified Efren Lind since he is not due for his next dose of pain medication for another hour       Baljit Allred RN  11/16/17 6100

## 2017-11-16 NOTE — ED NOTES
Pt wife called, wanted to have an update  She also requested to have the doctor call her if she was not here when they see the patient       Eugenia Allred RN  11/16/17 0399

## 2017-11-16 NOTE — CONSULTS
Consultation - [facility]  Oral Jackson 38 y o  male MRN: [id number]  Unit/Bed#: ED 08 Encounter: [id number]        Consults    Reason for Consult / Principal Problem: Epigastric/Chest Pain, Nausea, Vomiting    HPI: Mr Estephania Man is a 39 yo M with a PMH of chronic pancreatitis with pseudocyst formation s/p pseudocyst drainage with cyst gastrostomy about 1 year ago with subsequent complication of infected cyst and liver abscess treated with pigtail stents and drainage, hypertriglyceridemia, tobacco use, GERD, asthma, presenting with 2-3 weeks of persistent epigastric pain radiating up into the chest and jaw associated with intermittent nausea/vomiting  Prior to this he was doing very well because he was receiving celiac plexus/splanchnic nerve blocks with Dr Soledad Alvarenga for chornic pancreatitis  He went to [facility] when this epigastric pain started because it was different from his chronic pancreatitis pain and they noted gastritis on CT  The pain became dulled and bearable until yesterday when the pain became severe around 12:30PM prompting ER visit  His PCP increased his protonix to BID yesterday and he was schedule to see Dr Jj Olmos later this month  His last EGD was over 1 year ago  He has been taking protonix daily for a few years and believes his reflux has generally been under control but with his chronic pain, he is has been unable to tell what his pain is always from  He denies dysphagia or hematemesis  He is having normal formed BMs without melena or hematochezia  He denies unintentional weight loss  He denies fever at home and takes his temperature regularly  He continues to smoke but has cut down significantly   He occasionally drinks 2-3 beers socially, usually once monthly at most     REVIEW OF SYSTEMS: Negative except for as stated above    Historical Information   Past Medical History:   Diagnosis Date    Asthma     GERD (gastroesophageal reflux disease)     Hyperlipidemia     Liver abscess     Meniscus tear     Pancreatitis      Past Surgical History:   Procedure Laterality Date    CHOLECYSTECTOMY      KNEE ARTHROSCOPY Bilateral     LIVER SURGERY      PANCREAS SURGERY      stents    SD INJECT NERV BLCK,CELIAC PLEXUS Bilateral 5/9/2017    Procedure: CELIAC PLEXUS BLOCK ;  Surgeon: Sacha Everett MD;  Location: [facility];  Service: Pain Management     SD INJECT NERV BLCK,CELIAC PLEXUS Bilateral 6/1/2017    Procedure: SPLANCHNIC NERVE BLOCK at T12;  Surgeon: Sacha Everett MD;  Location: [facility];  Service: Pain Management     SD INJECT NERV BLCK,CELIAC PLEXUS Bilateral 8/8/2017    Procedure: BILATERAL SPLANCHNIC NERVE BLOCK T12;  Surgeon: Sacha Everett MD;  Location: [facility];  Service: Pain Management     SD LAP,CHOLECYSTECTOMY N/A 2/14/2017    Procedure: LAPAROSCOPIC CHOLECYSTECTOMY, IOC, POSSIBLE OPEN ;  Surgeon: Traecy Galindo MD;  Location: [facility];  Service: General    ROTATOR CUFF REPAIR Right     SHOULDER ARTHROSCOPY      SHOULDER ARTHROSCOPY Right      Social History   History   Alcohol Use No     History   Drug Use No     History   Smoking Status    Current Every Day Smoker    Packs/day: 0 50    Years: 20 00   Smokeless Tobacco    Not on file     Family History   Problem Relation Age of Onset    Cirrhosis Mother        Meds/Allergies       (Not in a hospital admission)  Current Facility-Administered Medications   Medication Dose Route Frequency    acetaminophen (TYLENOL) tablet 650 mg  650 mg Oral Q6H PRN    albuterol (PROVENTIL HFA,VENTOLIN HFA) inhaler 1 puff  1 puff Inhalation Q4H PRN    aspirin tablet 325 mg  325 mg Oral Daily    atorvastatin (LIPITOR) tablet 40 mg  40 mg Oral Daily With Dinner    calcium carbonate (TUMS) chewable tablet 1,000 mg  1,000 mg Oral Daily PRN    enalaprilat (VASOTEC) injection 0 625 mg  0 625 mg Intravenous Q6H PRN    enoxaparin (LOVENOX) subcutaneous injection 40 mg  40 mg Subcutaneous Daily    HYDROmorphone (DILAUDID) 1 mg/mL injection 1 mg  1 mg Intravenous Q3H PRN    magnesium hydroxide (MILK OF MAGNESIA) 400 mg/5 mL oral suspension 30 mL  30 mL Oral Daily PRN    multi-electrolyte (ISOLYTE-S PH 7 4 equivalent) IV solution  125 mL/hr Intravenous Continuous    niacin (NIASPAN) CR tablet 500 mg  500 mg Oral Daily With Breakfast    nicotine (NICODERM CQ) 14 mg/24hr TD 24 hr patch 1 patch  1 patch Transdermal Daily    nitroglycerin (NITROSTAT) SL tablet 0 4 mg  0 4 mg Sublingual Q5 Min PRN    ondansetron (ZOFRAN) injection 4 mg  4 mg Intravenous Q6H PRN    oxyCODONE (ROXICODONE) IR tablet 5 mg  5 mg Oral Q4H PRN    pancrelipase (Lip-Prot-Amyl) (CREON) delayed release capsule 12,000 Units  12,000 Units Oral TID With Meals    pantoprazole (PROTONIX) injection 40 mg  40 mg Intravenous Q12H CARRIE    sodium chloride 0 9 % infusion  125 mL/hr Intravenous Continuous    traZODone (DESYREL) tablet 50 mg  50 mg Oral HS     Facility-Administered Medications Ordered in Other Encounters   Medication Dose Route Frequency    oxyCODONE-acetaminophen (PERCOCET) 5-325 mg per tablet 2 tablet  2 tablet Oral Once       Allergies   Allergen Reactions    Bee Venom Swelling           Objective     Blood pressure 105/63, pulse 70, temperature 98 6 °F (37 °C), temperature source Oral, resp  rate 20, height 5' 11" (1 803 m), weight 92 1 kg (203 lb), SpO2 94 %      No intake or output data in the 24 hours ending 11/16/17 1043      PHYSICAL EXAM:      General Appearance:   Alert, cooperative, no distress   HEENT:   Normocephalic, atraumatic, anicteric   Neck:  Supple, symmetrical, trachea midline   Lungs:   Clear to auscultation bilaterally, no respiratory distress   Heart[de-identified]   RRR, no murmur   Abdomen:   Non-distended, soft, BS active, (+) TTP in the epigastric region   Rectal:   Deferred    Extremities:  No cyanosis or edema    Skin:  No jaundice or pallor     Lab Results:     Results from last 7 days  Lab Units 11/16/17  0561 11/15/17  2227   WBC Thousand/uL 6 06 11 93*   HEMOGLOBIN g/dL 12 9 15 2   HEMATOCRIT % 38 2 45 4   PLATELETS Thousands/uL 140*  140* 183   NEUTROS PCT %  --  81*   LYMPHS PCT %  --  10*   MONOS PCT %  --  8   EOS PCT %  --  1       Results from last 7 days  Lab Units 11/16/17  0539 11/15/17  2227   SODIUM mmol/L 140 140   POTASSIUM mmol/L 4 1 3 7   CHLORIDE mmol/L 106 101   CO2 mmol/L 28 28   BUN mg/dL 11 11   CREATININE mg/dL 0 89 0 96   CALCIUM mg/dL 8 5 9 7   TOTAL PROTEIN g/dL  --  7 9   BILIRUBIN TOTAL mg/dL  --  0 60   ALK PHOS U/L  --  70   ALT U/L  --  35   AST U/L  --  13   GLUCOSE RANDOM mg/dL 103 113       Results from last 7 days  Lab Units 11/16/17  0539   INR  1 12       Results from last 7 days  Lab Units 11/16/17  0539 11/15/17  2227   LIPASE u/L  --  335   AMYLASE IU/L 54  --        Imaging Studies: I have personally reviewed pertinent imaging studies  Pe Study With Ct Abdomen & Pelvis With Contrast  Result Date: 11/16/2017  Impression: CTA chest: No pulmonary embolus or acute intrathoracic process  CT abdomen and pelvis: Severe nonspecific gastritis with thickening extending to the GE junction  Minimal subdiaphragmatic free fluid extends through the esophageal hiatus into the right posterior mediastinum  Segmental patchy hypoenhancement of the gastric cardia and fundus  is nonspecific  Intramural phlegmon, early necrosis, or infiltrative process may have this appearance  No bowel obstruction, free gas, or emphysematous gastritis  Focal hypodensity reported in the tail of the pancreas by [facility] radiologist appears to be posterior to the pancreatic parenchyma rather than within the parenchyma images 80-82 series 606  Hepatomegaly measuring 24 cm craniocaudal has progressed since August 14, 2017        ASSESSMENT and PLAN:        Epigastric Abdominal Pain  Severe Gastritis  - Suspect his abdominal pain is from severe gastritis extending to the GE junction noted on CT with minimal subdiaphragmatic free fluid extending through the esophageal hiatus into the posterior mediastinum; also with patchy gastric cardia and fundus  - Trops neg, EKG unremarkable  - Diff diagnosis includes severe reflux induced gastritis/esophagitis v Soriano's v H  Pylori v malignancy v reactive etiology from acute on chronic pancreatitis  - No free air or obstruction noted on CT  - Keep NPO  - Plan for EGD today to evaluate the gastritis and biopsy  - Continue protonix BID  - Further recommendations pending EGD      Focal Hypodensity Posterior to Pancreatic Tail  Hx Chronic Pancreatitis  - Hypodensity noted in the tail of the pancreas v posterior to the pancreatic parenchyma  - Unclear if this could be fatty tissue v scarring  - He will likely need MRI/MRCP for further clarification  - Continue creon supplements with meals once he starts taking PO  - Check TG  - Lipase and LFTs normal on admission with no objective signs of acute pancreatitis        Patient will be seen and examined by Dr Chris Polk  All rod medical decisions were made by Dr Chris Polk  Thank you for allowing us to participate in the care of this patient  We will follow with you closely

## 2017-11-16 NOTE — ED PROVIDER NOTES
History  Chief Complaint   Patient presents with    Chest Pain     pt c/o cp that started around noon today  Pt states it hurts more with breathing  38year-old male presents with 10 hours of epigastric pain that has radiated to his chest in the proceeding hours  Patient describes severe pressure that started gradually while at rest and has progressed  Patient states breathing aggravates the pain and nothing alleviates it  ROS: Patient denies fever/chills, nausea/vomiting, diarrhea, dyspnea, anorexia, constipation, diaphoresis, groin pain, dysuria, hematuria, melena, or back/neck pain  All other systems reviewed and negative  Patient denies any recent illnesses  Patient denies any recent use of antibiotics, international travel, or trauma  Patient notes history of prior cholecystectomy and surgery for a liver cyst  Patient notes history of chronic pancreatitis that has been in remission  The patient denies a history of atherosclerotic disease (CAD/TIA/CVA/PAD)  Patient denies any history of hypertension, affirms hyperlipidemia, denies diabetes; denies any early family history of CAD (male less than 51yo or female less than 73 yo)  The patient affirms any use of tobacco in the past 90 days  The patient denies any use of illicit drugs, including cocaine  Patient denies any immobilization of at least 3 days or surgery in the past 4 weeks  Patient denies any history of DVT or PE  Patient denies any malignancy with treatment within the past 6 months  Objective:   Vital signs reviewed  Constitutional: Moderate acute distress  Eyes: No scleral icterus  HENT: Head normocephalic  Pharynx moist    CV: Borderline tachycardic rate and regular rhythm  Respiratory: Lungs clear to auscultation bilaterally without adventitious sounds  Abdomen: Inspection of an abdomen with previous abdominal surgical incisions noted without erythema, rashes or ecchymosis noted   No abdominal pulsations noted  Normal bowel sounds with no bruit auscultated  Soft abdomen  Palpitation noted tenderness in the epigastric area with lesser in the remainder; tenderness noted over McBurney's point  No masses or pulsatile aorta noted on examination  No rebound or guarding noted on examination, non-peritoneal exam    Back: Normal inspection with no rash or signs of herpes zoster  Costovertebral tenderness not present  Skin: No ecchymosis of the umbilicus (negative Drew's sign) or flank (negative Grey Flaherty's sign)  Warm and dry  Extremities: Non-tender lower extremities without asymmetry  Neuro: Alert  Answers questions appropriately  Psych: Normal mood and affect  Medical Decision Making   Abdominal pain with a broad differential  Will establish IV access and make patient NPO considering possibility of surgical intervention required  Will administer ketorolac for pain control  Will initiate fluids at this point in the assessment  Differential includes significant likelihood of intra-abdominal pathology and based on patient's exam findings and history  Will obtain EKG and troponin to evaluate for potential referred pain related to ACS  Will obtain CBC to evaluate for anemia and leukocytosis  Will obtain CMP to evaluate electrolytes, renal and hepatic function  Will obtain lipase to evaluate for potential pancreatitis  Will obtain lactate to evaluate for mesenteric ischemia and sepsis  Patient is a low risk Dyana Shi but he did drive back from [place] today continuously for 4 hours  As such will obtain D-dimer to evaluate potential for pulmonary embolism considering his pain now includes his chest is noted to be pleuritic  Based on patient's history, physical exam and presenting complaint, will obtain contrast enhanced CT imaging of patient's abdomen and pelvis to further evaluate for possible intraabdominal pathology including appendicitis, diverticulitis, or obstruction                 Prior to Admission Medications   Prescriptions Last Dose Informant Patient Reported? Taking? Chlorzoxazone (LORZONE) 750 MG TABS   Yes Yes   Sig: Take 750 mg by mouth 2 (two) times a day   EPINEPHrine (EPIPEN 2-ARIEL) 0 3 mg/0 3 mL SOAJ   Yes No   Sig: EpiPen 2-Ariel 0 3 MG/0 3ML Injection Solution Auto-injector  INJECT 0 3ML INTRAMUSCULARLY AS DIRECTED     Quantity: 1;  Refills: 1      Toro Briceño ; Active  2 Solution Auto-injector Pen   FLAXSEED, LINSEED, PO  Self Yes No   Sig: Take 1,000 mg by mouth 3 (three) times a day   Pancrelipase, Lip-Prot-Amyl, (CREON) 67808 units CPEP   Yes Yes   Sig: Take by mouth   albuterol (PROVENTIL HFA,VENTOLIN HFA) 90 mcg/act inhaler   Yes No   Sig: Inhale 1-2 puffs every 4 (four) hours as needed for wheezing     albuterol (VENTOLIN HFA) 90 mcg/act inhaler   Yes Yes   Sig: Inhale   aspirin 325 mg tablet   Yes No   Sig: Take 325 mg by mouth daily     choline fenofibrate (TRILIPIX) 135 MG capsule   Yes No   Sig: Take 135 mg by mouth daily     niacin 50 mg tablet  Self Yes Yes   Sig: Take 50 mg by mouth daily with breakfast   omega-3-acid ethyl esters (LOVAZA) 1 g capsule   Yes Yes   Sig: Take 2 g by mouth 2 (two) times a day     ondansetron (ZOFRAN) 4 mg tablet   Yes Yes   Sig: Take by mouth   pancrelipase, Lip-Prot-Amyl, (CREON) 12,000 units capsule   Yes Yes   Sig: Take by mouth   pancrelipase, Lip-Prot-Amyl, (CREON) 24,000 units   No No   Sig: Take 1 capsule by mouth 3 (three) times a day with meals for 30 days   Patient taking differently: Take 12,000 Units by mouth daily     pantoprazole (PROTONIX) 40 mg   Yes No   Sig: Take by mouth   pantoprazole (PROTONIX) 40 mg tablet   Yes No   Sig: Take 40 mg by mouth daily   rosuvastatin (CRESTOR) 20 MG tablet   Yes No   Sig: Take 20 mg by mouth daily     rosuvastatin (CRESTOR) 20 MG tablet   Yes No   Sig: Take by mouth   traZODone (DESYREL) 50 mg tablet  Self Yes No   Sig: Take 50 mg by mouth daily at bedtime   traZODone (DESYREL) 50 mg tablet   Yes Yes   Sig: Take by mouth      Facility-Administered Medications: None       Past Medical History:   Diagnosis Date    Asthma     GERD (gastroesophageal reflux disease)     Hyperlipidemia     Liver abscess     Meniscus tear     Pancreatitis        Past Surgical History:   Procedure Laterality Date    CHOLECYSTECTOMY      KNEE ARTHROSCOPY Bilateral     LIVER SURGERY      PANCREAS SURGERY      stents    CT INJECT NERV BLCK,CELIAC PLEXUS Bilateral 5/9/2017    Procedure: CELIAC PLEXUS BLOCK ;  Surgeon: Katelin Stein MD;  Location: [facility];  Service: Pain Management     CT INJECT NERV BLCK,CELIAC PLEXUS Bilateral 6/1/2017    Procedure: SPLANCHNIC NERVE BLOCK at T12;  Surgeon: Katelin Stein MD;  [facility];  Service: Pain Management     CT INJECT NERV BLCK,CELIAC PLEXUS Bilateral 8/8/2017    Procedure: BILATERAL SPLANCHNIC NERVE BLOCK T12;  Surgeon: Katelin Stein MD;  [facility]: [facility];  Service: Pain Management     CT LAP,CHOLECYSTECTOMY N/A 2/14/2017    Procedure: LAPAROSCOPIC CHOLECYSTECTOMY, IOC, POSSIBLE OPEN ;  Surgeon: Wyline Schilder, MD;  Location: [facility];  Service: General    ROTATOR CUFF REPAIR Right     SHOULDER ARTHROSCOPY      SHOULDER ARTHROSCOPY Right        Family History   Problem Relation Age of Onset    Cirrhosis Mother      I have reviewed and agree with the history as documented      Social History   Substance Use Topics    Smoking status: Current Every Day Smoker     Packs/day: 0 50     Years: 20 00    Smokeless tobacco: Not on file    Alcohol use No        Review of Systems    Physical Exam  ED Triage Vitals   Temperature Pulse Respirations Blood Pressure SpO2   11/15/17 2203 11/15/17 2200 11/15/17 2200 11/15/17 2200 11/15/17 2200   98 8 °F (37 1 °C) 99 20 151/87 97 %      Temp Source Heart Rate Source Patient Position - Orthostatic VS BP Location FiO2 (%)   11/15/17 2203 11/15/17 2200 11/15/17 2200 11/15/17 2200 --   Oral Monitor Lying Right arm       Pain Score       11/15/17 2158       8           Orthostatic Vital Signs  Vitals:    11/15/17 2330 11/16/17 0037 11/16/17 0105 11/16/17 0200   BP:  145/81 126/59 131/68   Pulse: 80 88 82 81   Patient Position - Orthostatic VS:  Lying Lying        Physical Exam    ED Medications  Medications   multi-electrolyte (ISOLYTE-S PH 7 4 equivalent) IV solution (125 mL/hr Intravenous New Bag 11/16/17 0051)   albuterol (PROVENTIL HFA,VENTOLIN HFA) inhaler 1 puff (not administered)   aspirin tablet 325 mg (not administered)   niacin tablet 50 mg (not administered)   pancrelipase (Lip-Prot-Amyl) (CREON) delayed release capsule 12,000 Units (not administered)   atorvastatin (LIPITOR) tablet 40 mg (not administered)   traZODone (DESYREL) tablet 50 mg (not administered)   pantoprazole (PROTONIX) injection 40 mg (not administered)   sodium chloride 0 9 % infusion (not administered)   magnesium hydroxide (MILK OF MAGNESIA) 400 mg/5 mL oral suspension 30 mL (not administered)   ondansetron (ZOFRAN) injection 4 mg (not administered)   calcium carbonate (TUMS) chewable tablet 1,000 mg (not administered)   nicotine (NICODERM CQ) 14 mg/24hr TD 24 hr patch 1 patch (not administered)   enoxaparin (LOVENOX) subcutaneous injection 40 mg (not administered)   acetaminophen (TYLENOL) tablet 650 mg (not administered)   HYDROmorphone (DILAUDID) 1 mg/mL injection 1 mg (not administered)   enalaprilat (VASOTEC) injection 0 625 mg (not administered)   nitroglycerin (NITROSTAT) SL tablet 0 4 mg (not administered)   aspirin chewable tablet 162 mg (162 mg Oral Given 11/15/17 2231)   ketorolac (TORADOL) 30 mg/mL injection 15 mg (15 mg Intravenous Given 11/15/17 2231)   sodium chloride 0 9 % bolus 1,000 mL (0 mL Intravenous Stopped 11/15/17 2333)   iohexol (OMNIPAQUE) 350 MG/ML injection (MULTI-DOSE) 100 mL (100 mL Intravenous Given 11/15/17 2310)   HYDROmorphone (DILAUDID) 1 mg/mL injection 1 mg (1 mg Intravenous Given 11/16/17 0028) ondansetron (ZOFRAN) injection 4 mg (4 mg Intravenous Given 11/16/17 0028)   HYDROmorphone (DILAUDID) 1 mg/mL injection 1 mg (1 mg Intravenous Given 11/16/17 0245)       Diagnostic Studies  Results Reviewed     Procedure Component Value Units Date/Time    Basic metabolic panel [[id number]]     Lab Status:  No result Specimen:  Blood     TSH, 3rd generation [[id number]]     Lab Status:  No result Specimen:  Blood     Magnesium [[id number]]     Lab Status:  No result Specimen:  Blood     CBC (With Platelets) [[id number]]     Lab Status:  No result Specimen:  Blood     Protime-INR [[id number]]     Lab Status:  No result Specimen:  Blood     APTT [[id number]]     Lab Status:  No result Specimen:  Blood     Platelet count [[id number]]     Lab Status:  No result Specimen:  Blood     Troponin I [[id number]]     Lab Status:  No result Specimen:  Blood     Troponin I [[id number]]     Lab Status:  No result Specimen:  Blood     Amylase [[id number]]     Lab Status:  No result Specimen:  Blood     Troponin I [[id number]]  (Normal) Collected:  11/15/17 2227    Lab Status:  Final result Specimen:  Blood from Arm, Left Updated:  11/15/17 2257     Troponin I <0 02 ng/mL     Narrative:         [employer] Chemistry analyzer 99% cutoff is > 0 04 ng/mL in network labs    o cTnI 99% cutoff is useful only when applied to patients in the clinical setting of myocardial ischemia  o cTnI 99% cutoff should be interpreted in the context of clinical history, ECG findings and possibly cardiac imaging to establish correct diagnosis  o cTnI 99% cutoff may be suggestive but clearly not indicative of a coronary event without the clinical setting of myocardial ischemia  Lactic acid, plasma [[id number]]  (Normal) Collected:  11/15/17 2227    Lab Status:  Final result Specimen:  Blood from Arm, Left Updated:  11/15/17 2254     LACTIC ACID 1 1 mmol/L     Narrative:         Result may be elevated if tourniquet was used during collection      CMP [[id number]] Collected: 11/15/17 2227    Lab Status:  Final result Specimen:  Blood from Arm, Left Updated:  11/15/17 2251     Sodium 140 mmol/L      Potassium 3 7 mmol/L      Chloride 101 mmol/L      CO2 28 mmol/L      Anion Gap 11 mmol/L      BUN 11 mg/dL      Creatinine 0 96 mg/dL      Glucose 113 mg/dL      Calcium 9 7 mg/dL      AST 13 U/L      ALT 35 U/L      Alkaline Phosphatase 70 U/L      Total Protein 7 9 g/dL      Albumin 4 4 g/dL      Total Bilirubin 0 60 mg/dL      eGFR 96 ml/min/1 73sq m     Narrative:         National Kidney Disease Education Program recommendations are as follows:  GFR calculation is accurate only with a steady state creatinine  Chronic Kidney disease less than 60 ml/min/1 73 sq  meters  Kidney failure less than 15 ml/min/1 73 sq  meters      Lipase [[id number]]  (Normal) Collected:  11/15/17 2227    Lab Status:  Final result Specimen:  Blood from Arm, Left Updated:  11/15/17 2251     Lipase 335 u/L     D-dimer, quantitative [[id number]]  (Abnormal) Collected:  11/15/17 2227    Lab Status:  Final result Specimen:  Blood from Arm, Left Updated:  11/15/17 2249     D-Dimer, Quant 466 (H) ng/ml (FEU)     CBC and differential [[id number]]  (Abnormal) Collected:  11/15/17 2227    Lab Status:  Final result Specimen:  Blood from Arm, Left Updated:  11/15/17 2235     WBC 11 93 (H) Thousand/uL      RBC 4 82 Million/uL      Hemoglobin 15 2 g/dL      Hematocrit 45 4 %      MCV 94 fL      MCH 31 5 pg      MCHC 33 5 g/dL      RDW 12 2 %      MPV 10 3 fL      Platelets 425 Thousands/uL      nRBC 0 /100 WBCs      Neutrophils Relative 81 (H) %      Lymphocytes Relative 10 (L) %      Monocytes Relative 8 %      Eosinophils Relative 1 %      Basophils Relative 0 %      Neutrophils Absolute 9 63 (H) Thousands/µL      Lymphocytes Absolute 1 21 Thousands/µL      Monocytes Absolute 0 89 Thousand/µL      Eosinophils Absolute 0 12 Thousand/µL      Basophils Absolute 0 03 Thousands/µL                  PE Study with CT abdomen & pelvis with contrast   ED Interpretation by Rajan Paiz MD (28/79 0025)   IMPRESSION:   1  Abnormal heterogeneously thickened appearance of the gastric fundus is present  Differential   would include fundal gastritis  Infiltrating malignancy not excluded; definitive GI evaluation is   recommended  2  Focal hypodensity in the tail of the pancreas measures up to 12 mm  Differential would include   edema  There is left upper quadrant stranding which is felt likely to originate from the stomach,   however correlation with amylase and lipase is recommended  However differential for the   appearance of the pancreas would include a pancreatic tail mass lesion and definitive evaluation is   recommended  Procedures  Procedures       Phone Contacts  ED Phone Contact    ED Course  ED Course as of Nov 16 0415   Wed Nov 15, 2017   2331 Patient's EKG demonstrates normal sinus rhythm  There is an incomplete right bundle-branch block which is consistent with the prior EKG  There is no acute ST segment or T-wave changes  Thu Nov 16, 2017   0025 Patient's CT findings with abnormal appearance of the stomach in the pancreas  Explained this to him in the possibility of  malignancy but based upon patient's history and current findings, more consistent with his history of chronic pancreatitis  Patient agreeable for admission for more definitive evaluation by Gastroenterology            HEART Risk Score    Flowsheet Row Most Recent Value   History  0 Filed at: 11/15/2017 2252   ECG  0 Filed at: 11/15/2017 2252   Age  0 Filed at: 11/15/2017 2252   Risk Factors  1 Filed at: 11/15/2017 2252   Troponin  0 Filed at: 11/15/2017 2252   Heart Score Risk Calculator   History  0 Filed at: 11/15/2017 2252   ECG  0 Filed at: 11/15/2017 2252   Age  0 Filed at: 11/15/2017 2252   Risk Factors  1 Filed at: 11/15/2017 2252   Troponin  0 Filed at: 11/15/2017 2252   HEART Score  1 Filed at: 11/15/2017 2252   HEART Score  1 Filed at: 11/15/2017 2252                      Jonas' Criteria for PE    Flowsheet Row Most Recent Value   Wells' Criteria for PE   Clinical signs and symptoms of DVT  0 Filed at: 11/15/2017 2253   PE is primary diagnosis or equally likely  0 Filed at: 11/15/2017 2253   HR >100  0 Filed at: 11/15/2017 2253   Immobilization at least 3 days or Surgery in the previous 4 weeks  0 Filed at: 11/15/2017 2253   Previous, objectively diagnosed PE or DVT  0 Filed at: 11/15/2017 2253   Hemoptysis  0 Filed at: 11/15/2017 2253   Malignancy with treatment within 6 months or palliative  0 Filed at: 11/15/2017 2253   Wells' Criteria Total  0 Filed at: 11/15/2017 2253            MDM  CritCare Time    Disposition  Final diagnoses:   Epigastric pain   Pancreatic lesion   Chronic pancreatitis ([facility] [address] )   Chest pain     Time reflects when diagnosis was documented in both MDM as applicable and the Disposition within this note     Time User Action Codes Description Comment    11/16/2017 12:28 AM Wild Srinivas Add [R10 13] Epigastric pain     11/16/2017 12:28 AM Mcguire Chute [K86 9] Pancreatic lesion     11/16/2017 12:28 AM Wild Srinivas Add [K86 1] Chronic pancreatitis ([facility] [address] )     11/16/2017 12:28 AM Wild Srinivas Add [R07 9] Chest pain       ED Disposition     ED Disposition Condition Comment    Admit  Case was discussed with CAREY and the patient's admission status was agreed to be Admission Status: inpatient status to the service of Dr Hannah Correia    None       Patient's Medications   Discharge Prescriptions    No medications on file     No discharge procedures on file      ED Provider  Electronically Signed by           Dianne Alatorre MD  11/16/17 3504

## 2017-11-16 NOTE — OP NOTE
**** GI/ENDOSCOPY REPORT ****     PATIENT NAME: Efrain Mcghee - VISIT ID:  Patient ID: [id number]   YOB: 1975     INTRODUCTION: Esophagogastroduodenoscopy - A 38 male patient presents for   an inpatient Esophagogastroduodenoscopy at [address] [place] [address]  INDICATIONS: Nausea, vomiting, abdominal pain, abdnormal ct scan     CONSENT: The benefits, risks, and alternatives to the procedure were   discussed and informed consent was obtained from the patient  PREPARATION:  EKG, pulse, pulse oximetry and blood pressure were monitored   throughout the procedure  MEDICATIONS: Anesthesia administered by anesthesiologist      PROCEDURE:  The endoscope was passed without difficulty through the mouth   under direct visualization and advanced to the 2nd portion of the   duodenum  The scope was withdrawn and the mucosa was carefully examined  FINDINGS:   Esophagus: LA Class C esophagitis was found in the distal   third of the esophagus  Multiple random biopsies was taken from the   distal third of the esophagus  Stomach: Slightly prominent folds in the   fundus  Multiple random biopsies was taken  The antrum appeared to be   normal   Pylorus: The pylorus appeared to be normal, symmetrical, and   patent  Duodenum: The duodenal bulb and 2nd portion of the duodenum   appeared to be normal      COMPLICATIONS: There were no complications  IMPRESSIONS: Esophagitis seen in the distal third of the esophagus  Slightly prominent folds in the fundus maybe due to recent   nausea/vomiting  Normal antrum  Normal, symmetrical, and patent pylorus  Normal duodenal bulb and 2nd portion of the duodenum  RECOMMENDATIONS: Avoid all non-steroidal anti-inflammatory drugs (NSAID's)   including but not limited to Ibuprofen, Advil, Motrin, and Nuprin  Start   anti-reflux diet  Continue taking the following medications as prescribed:   Protonix  EGD recommended in 3 months       ESTIMATED BLOOD LOSS:     PATHOLOGY SPECIMENS: Multiple random biopsies taken from the distal third   of the esophagus  Multiple random biopsies taken  PROCEDURE CODES: 29219 - EGD flexible; with biopsy     ICD-9 Codes: 530 10 Esophagitis, unspecified     ICD-10 Codes: K20 9 Esophagitis, unspecified     PERFORMED BY: DAINA Herrera  on 11/16/2017  Version 1, electronically signed by DAINA Ellison  on 11/16/2017 at   14:25

## 2017-11-16 NOTE — ANESTHESIA POSTPROCEDURE EVALUATION
Post-Op Assessment Note      CV Status:  Stable    Mental Status:  Alert and awake    Hydration Status:  Euvolemic    PONV Controlled:  Controlled    Airway Patency:  Patent    Post Op Vitals Reviewed: Yes          Staff: CRNA           /60 (11/16/17 1413)    Temp      Pulse 79 (11/16/17 1413)   Resp 16 (11/16/17 1413)    SpO2 99 % (11/16/17 1413)

## 2017-11-16 NOTE — ANESTHESIA PREPROCEDURE EVALUATION
Review of Systems/Medical History  Patient summary reviewed  Chart reviewed  No history of anesthetic complications     Cardiovascular  EKG reviewed, Exercise tolerance: good,  Hyperlipidemia, Dysrhythmias (RBBB), ,    Pulmonary  Smoker , Tobacco cessation counseling given, Asthma (never hospitalized for asthma, uses rescue inhaler several times daily during change of seasons): Asthma type of rescue: PRN inhaler, No recent URI , ,        GI/Hepatic    GERD , Liver disease (Liver abscess), ,   Comment: Confirmed NPO appropriate  Vomiting yesterday afternoon (bilious, nonbloodY) but nothing since  Pancreatic pseudocyst, chronic abdominal pain       Comment: Renal mass     Endo/Other  Negative endo/other ROS      GYN       Hematology  Negative hematology ROS   Thrombocytopenia,    Musculoskeletal  Negative musculoskeletal ROS        Neurology  Negative neurology ROS      Psychology   Negative psychology ROS            Physical Exam    Airway    Mallampati score: II  TM Distance: >3 FB  Neck ROM: full     Dental       Cardiovascular  Rhythm: regular, Rate: normal, Cardiovascular exam normal    Pulmonary  Pulmonary exam normal No wheezes,     Other Findings        Anesthesia Plan  ASA Score- 3       Anesthesia Type- IV sedation with anesthesia with ASA Monitors  Additional Monitors:   Airway Plan:     Comment: I discussed the risks and benefits of IV sedation anesthesia including the possibility of the need to convert to general anesthesia and the potential risk of awareness  The patient was given the opportunity to ask questions, which were answered          Induction- intravenous  Informed Consent- Anesthetic plan and risks discussed with patient  I personally reviewed this patient with the CRNA  Discussed and agreed on the Anesthesia Plan with the CRNA       Lab Results   Component Value Date    WBC 6 06 11/16/2017    HGB 12 9 11/16/2017    HCT 38 2 11/16/2017    MCV 94 11/16/2017     (L) 11/16/2017     (L) 11/16/2017     Lab Results   Component Value Date    GLUCOSE 103 11/16/2017    CALCIUM 8 5 11/16/2017     11/16/2017    K 4 1 11/16/2017    CO2 28 11/16/2017     11/16/2017    BUN 11 11/16/2017    CREATININE 0 89 11/16/2017     Lab Results   Component Value Date    ALT 35 11/15/2017    AST 13 11/15/2017    ALKPHOS 70 11/15/2017    BILITOT 0 60 11/15/2017     Lab Results   Component Value Date    INR 1 12 11/16/2017    INR 0 85 (L) 08/14/2017    PROTIME 14 6 (H) 11/16/2017    PROTIME 11 8 (L) 08/14/2017

## 2017-11-16 NOTE — H&P
History and Physical - [address] [place] [address] Internal Medicine    Patient Information: Hillary Zarco 38 y o  male MRN: [id number]  Unit/Bed#: ED 08 Encounter: [id number]  Admitting Physician: Raquel Stockton PA-C  PCP: Tien Jaffe MD  Date of Admission:  11/16/17    Assessment/Plan:    Hospital Problem List:     Principal Problem:    Epigastric abdominal pain  Active Problems:    Pancreatic lesion    Smoker    Leukocytosis    Positive D dimer    RBBB    Chest pain    HLD (hyperlipidemia)      Plan for the Primary Problem(s):  · Epigastric abdominal pain/chest pain/pancreatic lesion  · Reviewed CT-GI consult, IV PPI, zofran, fluids, NPO, IV dilaudid, trend trops, tele monitoring, lipid panel, HTN control, home cholesterol meds, ASA, nitro PRN    Plan for Additional Problems:   · Tobacco use  · nicoderm patch, cessation counseling  · Leukocytosis  · Monitor CBC  · Positive dimer  · Negative for PE  · RBBB  · Avoid BB, tele  · HLD  · See above    VTE Prophylaxis: Enoxaparin (Lovenox)  / sequential compression device   Code Status: full  POLST: POLST form is not discussed and not completed at this time  Anticipated Length of Stay:  Patient will be admitted on an Inpatient basis with an anticipated length of stay of  > 2 midnights  Justification for Hospital Stay: GI workup    Total Time for Visit, including Counseling / Coordination of Care: 1 hour  Greater than 50% of this total time spent on direct patient counseling and coordination of care  Chief Complaint:   Epigastric abdominal pain    History of Present Illness:    Hillary Zarco is a 38 y o  male who presents with PMHx of chronic pancreatitis 2/2 HLD, GERD, asthma, liver abscess, and pancreatic cyst presenting with epigastric abdominal pain radiating into chest and left jaw x fifteen hours  Aggravated by driving, relived when lying on back  Recently increased PPI, follows Dr Elizabeth Sessions  Admits to nausea and vomiting but denies any other systemic sx at this time  Denies cardiac/ca in his FHx  Review of Systems:    Review of Systems   Constitutional: Negative for chills and fever  HENT: Negative for congestion  Eyes: Negative for visual disturbance  Respiratory: Negative for cough, shortness of breath and wheezing  Cardiovascular: Positive for chest pain  Negative for palpitations and leg swelling  Gastrointestinal: Positive for abdominal pain, nausea and vomiting  Negative for abdominal distention, constipation and diarrhea  Genitourinary: Negative for hematuria  Musculoskeletal: Negative for gait problem  Skin: Negative for color change  Neurological: Negative for dizziness, syncope, speech difficulty, weakness, light-headedness, numbness and headaches  Psychiatric/Behavioral: Negative for confusion         Past Medical and Surgical History:     Past Medical History:   Diagnosis Date    Asthma     GERD (gastroesophageal reflux disease)     Hyperlipidemia     Liver abscess     Meniscus tear     Pancreatitis        Past Surgical History:   Procedure Laterality Date    CHOLECYSTECTOMY      KNEE ARTHROSCOPY Bilateral     LIVER SURGERY      PANCREAS SURGERY      stents    UT INJECT NERV BLCK,CELIAC PLEXUS Bilateral 5/9/2017    Procedure: CELIAC PLEXUS BLOCK ;  Surgeon: Katelin Stein MD;  [facility];  Service: Pain Management     UT INJECT NERV BLCK,CELIAC PLEXUS Bilateral 6/1/2017    Procedure: SPLANCHNIC NERVE BLOCK at T12;  Surgeon: Katelin Stein MD;  [facility];  Service: Pain Management     UT INJECT NERV BLCK,CELIAC PLEXUS Bilateral 8/8/2017    Procedure: BILATERAL SPLANCHNIC NERVE BLOCK T12;  Surgeon: Katelin Stein MD;  [facility];  Service: Pain Management     UT LAP,CHOLECYSTECTOMY N/A 2/14/2017    Procedure: LAPAROSCOPIC CHOLECYSTECTOMY, IOC, POSSIBLE OPEN ;  Surgeon: Wyline Schilder, MD;  [facility];  Service: General    ROTATOR CUFF REPAIR Right     SHOULDER ARTHROSCOPY      SHOULDER ARTHROSCOPY Right        Meds/Allergies:    Prior to Admission medications    Medication Sig Start Date End Date Taking? Authorizing Provider   albuterol (VENTOLIN HFA) 90 mcg/act inhaler Inhale 12/15/16  Yes Historical Provider, MD   Chlorzoxazone (LORZONE) 750 MG TABS Take 750 mg by mouth 2 (two) times a day   Yes Historical Provider, MD   niacin 50 mg tablet Take 50 mg by mouth daily with breakfast   Yes Historical Provider, MD   omega-3-acid ethyl esters (LOVAZA) 1 g capsule Take 2 g by mouth 2 (two) times a day     Yes Historical Provider, MD   ondansetron (ZOFRAN) 4 mg tablet Take by mouth 7/20/17  Yes Historical Provider, MD   pancrelipiman Lip-Prot-Amyl, (CREON) 12,000 units capsule Take by mouth 4/13/17  Yes Historical Provider, MD   Pancrelipiman Lip-Prot-Amyl, (CREON) 59936 units CPEP Take by mouth 8/31/16  Yes Historical Provider, MD   traZODone (DESYREL) 50 mg tablet Take by mouth 3/9/17  Yes Historical Provider, MD   albuterol (PROVENTIL HFA,VENTOLIN HFA) 90 mcg/act inhaler Inhale 1-2 puffs every 4 (four) hours as needed for wheezing      Historical Provider, MD   aspirin 325 mg tablet Take 325 mg by mouth daily      Historical Provider, MD   choline fenofibrate (TRILIPIX) 135 MG capsule Take 135 mg by mouth daily      Historical Provider, MD   EPINEPHrine (EPIPEN 2-LARRY) 0 3 mg/0 3 mL SOAJ EpiPen 2-Larry 0 3 MG/0 3ML Injection Solution Auto-injector  INJECT 0 3ML INTRAMUSCULARLY AS DIRECTED     Quantity: 1;  Refills: 1      Toro Briceño ; Active  2 Solution Auto-injector Pen    Historical Provider, MD   FLAXSEED LINSEED, PO Take 1,000 mg by mouth 3 (three) times a day    Historical Provider, MD   pancrelipase, Lip-Prot-Amyl, (CREON) 24,000 units Take 1 capsule by mouth 3 (three) times a day with meals for 30 days  Patient taking differently: Take 12,000 Units by mouth daily   3/10/17 8/8/17  Aurora Arredondo DO   pantoprazole (PROTONIX) 40 mg tablet Take 40 mg by mouth daily    Historical Provider, MD   pantoprazole (PROTONIX) 40 mg Take by mouth    Historical Provider, MD   rosuvastatin (CRESTOR) 20 MG tablet Take 20 mg by mouth daily      Historical Provider, MD   rosuvastatin (CRESTOR) 20 MG tablet Take by mouth    Historical Provider, MD   traZODone (DESYREL) 50 mg tablet Take 50 mg by mouth daily at bedtime    Historical Provider, MD   Gabapentin, Once-Daily, (GRALISE PO) Take by mouth  11/16/17  Historical Provider, MD     nurse med rec    Allergies: Allergies   Allergen Reactions    Bee Venom Swelling       Social History:     Marital Status: /Civil Union   Occupation:   Patient Pre-hospital Living Situation: family  Patient Pre-hospital Level of Mobility: full  Patient Pre-hospital Diet Restrictions: none  Substance Use History:   History   Alcohol Use No     History   Smoking Status    Current Every Day Smoker    Packs/day: 0 50    Years: 20 00   Smokeless Tobacco    Not on file     History   Drug Use No       Family History:    Family History   Problem Relation Age of Onset    Cirrhosis Mother        Physical Exam:     Vitals:   Blood Pressure: 131/68 (11/16/17 0200)  Pulse: 81 (11/16/17 0200)  Temperature: 98 8 °F (37 1 °C) (11/15/17 2203)  Temp Source: Oral (11/15/17 2203)  Respirations: 18 (11/16/17 0200)  Height: 5' 11" (180 3 cm) (11/15/17 2158)  Weight - Scale: 92 1 kg (203 lb) (11/15/17 2158)  SpO2: 95 % (11/16/17 0200)    Physical Exam   Constitutional: He is oriented to person, place, and time  He appears well-developed and well-nourished  No distress  HENT:   Head: Normocephalic and atraumatic  Mouth/Throat: Oropharynx is clear and moist  No oropharyngeal exudate  Eyes: Conjunctivae are normal  Right eye exhibits no discharge  Left eye exhibits no discharge  No scleral icterus  Neck: Neck supple  Cardiovascular: Normal rate, regular rhythm, normal heart sounds and intact distal pulses  Exam reveals no gallop and no friction rub      No murmur heard   Pulmonary/Chest: Effort normal and breath sounds normal  No respiratory distress  He has no wheezes  He has no rales  He exhibits no tenderness  Abdominal: Soft  Bowel sounds are normal  He exhibits no distension and no mass  There is tenderness  There is no rebound and no guarding  Musculoskeletal: Normal range of motion  He exhibits no edema, tenderness or deformity  Neurological: He is alert and oriented to person, place, and time  No cranial nerve deficit  Coordination normal    Skin: Skin is warm and dry  No rash noted  He is not diaphoretic  No erythema  No pallor  Psychiatric: He has a normal mood and affect  His behavior is normal    Nursing note and vitals reviewed  Additional Data:     Lab Results: I have personally reviewed pertinent reports  Results from last 7 days  Lab Units 11/15/17  2227   WBC Thousand/uL 11 93*   HEMOGLOBIN g/dL 15 2   HEMATOCRIT % 45 4   PLATELETS Thousands/uL 183   NEUTROS PCT % 81*   LYMPHS PCT % 10*   MONOS PCT % 8   EOS PCT % 1       Results from last 7 days  Lab Units 11/15/17  2227   SODIUM mmol/L 140   POTASSIUM mmol/L 3 7   CHLORIDE mmol/L 101   CO2 mmol/L 28   BUN mg/dL 11   CREATININE mg/dL 0 96   CALCIUM mg/dL 9 7   TOTAL PROTEIN g/dL 7 9   BILIRUBIN TOTAL mg/dL 0 60   ALK PHOS U/L 70   ALT U/L 35   AST U/L 13   GLUCOSE RANDOM mg/dL 113           Imaging: I have personally reviewed pertinent reports  No results found  EKG, Pathology, and Other Studies Reviewed on Admission:   · PE study, CBC, dimer, lactic, CMP, lipase, trop x 1    Allscripts / Epic Records Reviewed: No     ** Please Note: This note has been constructed using a voice recognition system   **

## 2017-11-16 NOTE — CASE MANAGEMENT
[facility] in the [facility] by [facility] for 2017  Network Utilization Review Department  Phone: [phone]; Fax [phone]  ATTENTION: The Network Utilization Review Department is now centralized for our 7 Facilities  Make a note that we have a new phone and fax numbers for our Department  Please call with any questions or concerns to [phone] and carefully follow the prompts so that you are directed to the right person  All voicemails are confidential  Fax any determinations, approvals, denials, and requests for initial or continue stay review clinical to [phone]  Due to HIGH CALL volume, it would be easier if you could please send faxed requests to expedite your requests and in part, help us provide discharge notifications faster  Initial Clinical Review    Admission: Date/Time/Statement: 11/16/17 @ 0029     Orders Placed This Encounter   Procedures    Inpatient Admission (expected length of stay for this patient is greater than two midnights)     Standing Status:   Standing     Number of Occurrences:   1     Order Specific Question:   Admitting Physician     Answer:   Chuy Blank [[id number]]     Order Specific Question:   Level of Care     Answer:   Med Surg [16]     Order Specific Question:   Estimated length of stay     Answer:   More than 2 Midnights     Order Specific Question:   Certification     Answer:   I certify that inpatient services are medically necessary for this patient for a duration of greater than two midnights  See H&P and MD Progress Notes for additional information about the patient's course of treatment           ED: Date/Time/Mode of Arrival:   ED Arrival Information     Expected Arrival Acuity Means of Arrival Escorted By Service Admission Type    - 11/15/2017 21:54 Emergent Walk-In Self General Medicine Emergency    Arrival Complaint    CHEST PAIN          Chief Complaint:   Chief Complaint   Patient presents with   Mami Lynn Chest Pain     pt c/o cp that started around noon today  Pt states it hurts more with breathing  History of Illness: Bryon Moser is a 38 y o  male who presents with PMHx of chronic pancreatitis 2/2 HLD, GERD, asthma, liver abscess, and pancreatic cyst presenting with epigastric abdominal pain radiating into chest and left jaw x fifteen hours  Aggravated by driving, relived when lying on back  Recently increased PPI, follows Dr Linda Dia  Admits to nausea and vomiting but denies any other systemic sx at this time  Denies cardiac/ca in his FHx  Positive for chest pain  Negative for palpitations and leg swelling  Gastrointestinal: Positive for abdominal pain, nausea and vomiting    ED Vital Signs:   ED Triage Vitals   Temperature Pulse Respirations Blood Pressure SpO2   11/15/17 2203 11/15/17 2200 11/15/17 2200 11/15/17 2200 11/15/17 2200   98 8 °F (37 1 °C) 99 20 151/87 97 %      Temp Source Heart Rate Source Patient Position - Orthostatic VS BP Location FiO2 (%)   11/15/17 2203 11/15/17 2200 11/15/17 2200 11/15/17 2200 --   Oral Monitor Lying Right arm       Pain Score       11/15/17 2158       8        Wt Readings from Last 1 Encounters:   11/15/17 92 1 kg (203 lb)       Vital Signs (abnormal):      Abnormal Labs/Diagnostic Test Results:   Lab Units 11/15/17  2227   WBC Thousand/uL 11 93*   HEMOGLOBIN g/dL 15 2   HEMATOCRIT % 45 4   PLATELETS Thousands/uL 183   NEUTROS PCT % 81*   LYMPHS PCT % 10*   MONOS PCT % 8   EOS PCT % 1         Results from last 7 days  Lab Units 11/15/17  2227   SODIUM mmol/L 140   POTASSIUM mmol/L 3 7   CHLORIDE mmol/L 101   CO2 mmol/L 28   BUN mg/dL 11   CREATININE mg/dL 0 96   CALCIUM mg/dL 9 7   TOTAL PROTEIN g/dL 7 9   BILIRUBIN TOTAL mg/dL 0 60   ALK PHOS U/L 70   ALT U/L 35   AST U/L 13   GLUCOSE RANDOM mg/dL 113        CTA CHEST/ABD:CTA chest:     No pulmonary embolus or acute intrathoracic process      CT abdomen and pelvis:     Severe nonspecific gastritis with thickening extending to the GE junction  Minimal subdiaphragmatic free fluid extends through the esophageal hiatus into the right posterior mediastinum  Segmental patchy hypoenhancement of the gastric cardia and fundus   is nonspecific    Intramural phlegmon, early necrosis, or infiltrative process may have this appearance      No bowel obstruction, free gas, or emphysematous gastritis      Focal hypodensity reported in the tail of the pancreas by [facility] radiologist appears to be posterior to the pancreatic parenchyma rather than within the parenchyma images 80-82 series 606      Hepatomegaly measuring 24 cm craniocaudal has progressed since August 14, 2017            ED Treatment:   Medication Administration from 11/15/2017 2154 to 11/16/2017 1023       Date/Time Order Dose Route Action Action by Comments     11/15/2017 2231 aspirin chewable tablet 162 mg 162 mg Oral Given Halley Easley RN      11/15/2017 2231 ketorolac (TORADOL) 30 mg/mL injection 15 mg 15 mg Intravenous Given Halley Easley RN      11/15/2017 2333 sodium chloride 0 9 % bolus 1,000 mL 0 mL Intravenous Stopped Zane Allred RN      11/15/2017 2233 sodium chloride 0 9 % bolus 1,000 mL 1,000 mL Intravenous New Bag Halley Easley RN      11/15/2017 2310 iohexol (OMNIPAQUE) 350 MG/ML injection (MULTI-DOSE) 100 mL 100 mL Intravenous Given Tori Mosquera      11/16/2017 0028 HYDROmorphone (DILAUDID) 1 mg/mL injection 1 mg 1 mg Intravenous Given Zane Allred RN      11/16/2017 0028 ondansetron (ZOFRAN) injection 4 mg 4 mg Intravenous Given Zane Allred RN      11/16/2017 0051 multi-electrolyte (ISOLYTE-S PH 7 4 equivalent) IV solution 125 mL/hr Intravenous New Bag Zane Allred RN      11/16/2017 0245 HYDROmorphone (DILAUDID) 1 mg/mL injection 1 mg 1 mg Intravenous Given Zane Allred RN      11/16/2017 0913 aspirin tablet 325 mg 325 mg Oral Given Tri Nguyen RN      11/16/2017 0830 niacin tablet 50 mg 50 mg Oral Given Lamont Barton RN      11/16/2017 0830 pancrelipase (Lip-Prot-Amyl) (CREON) delayed release capsule 12,000 Units 12,000 Units Oral Given Lamont Barton RN      11/16/2017 0913 pantoprazole (PROTONIX) injection 40 mg 40 mg Intravenous Given Lamont Barton RN      11/16/2017 0617 sodium chloride 0 9 % infusion 0 mL/hr Intravenous Hold Zane Allred RN hold and continue fluids that are already running per CAREY schultz labar     11/16/2017 0913 nicotine (NICODERM CQ) 14 mg/24hr TD 24 hr patch 1 patch 1 patch Transdermal Medication Applied Lamont Barton RN      11/16/2017 0914 enoxaparin (LOVENOX) subcutaneous injection 40 mg 40 mg Subcutaneous Given Lamont Barton RN      11/16/2017 0914 HYDROmorphone (DILAUDID) 1 mg/mL injection 1 mg 1 mg Intravenous Given Lamont Barton RN      11/16/2017 0621 HYDROmorphone (DILAUDID) 1 mg/mL injection 1 mg 1 mg Intravenous Given Zane Allred RN      11/16/2017 0955 niacin ER capsule 500 mg 500 mg Oral Not Given Lamont Barton RN D/C order          Past Medical/Surgical History: Active Ambulatory Problems     Diagnosis Date Noted    Pancreatic lesion 02/14/2017    Renal mass 03/02/2017    Smoker 03/02/2017     Resolved Ambulatory Problems     Diagnosis Date Noted    No Resolved Ambulatory Problems     Past Medical History:   Diagnosis Date    Asthma     GERD (gastroesophageal reflux disease)     Hyperlipidemia     Liver abscess     Meniscus tear     Pancreatitis        Admitting Diagnosis: Chest pain [R07 9]    Age/Sex: 38 y o  male    Assessment/Plan:   Assessment/Plan:     Hospital Problem List:      Principal Problem:    Epigastric abdominal pain  Active Problems:    Pancreatic lesion    Smoker    Leukocytosis    Positive D dimer    RBBB    Chest pain    HLD (hyperlipidemia)        Plan for the Primary Problem(s):  · Epigastric abdominal pain/chest pain/pancreatic lesion  ?  Reviewed CT-GI consult, IV PPI, zofran, fluids, NPO, IV dilaudid, trend trops, tele monitoring, lipid panel, HTN control, home cholesterol meds, ASA, nitro PRN     Plan for Additional Problems:   · Tobacco use  ? nicoderm patch, cessation counseling  · Leukocytosis  ? Monitor CBC  · Positive dimer  ? Negative for PE  · RBBB  ? Avoid BB, tele  · HLD  ? See above     VTE Prophylaxis: Enoxaparin (Lovenox)  / sequential compression device   Code Status: full  POLST: POLST form is not discussed and not completed at this time      Anticipated Length of Stay:  Patient will be admitted on an Inpatient basis with an anticipated length of stay of  > 2 midnights     Justification for Hospital Stay: GI workup     Admission Orders:  INPT [facility]  TELE  OOB  CONSULT GASTROENTEROLOGY  IS  SEQ COMP DEVICE  NPO  Or: EGD  Scheduled Meds:   aspirin 325 mg Oral Daily   atorvastatin 40 mg Oral Daily With Dinner   enoxaparin 40 mg Subcutaneous Daily   niacin 500 mg Oral Daily With Breakfast   nicotine 1 patch Transdermal Daily   pancrelipase (Lip-Prot-Amyl) 12,000 Units Oral TID With Meals   pantoprazole 40 mg Intravenous Q12H [facility]   traZODone 50 mg Oral HS     Continuous Infusions:   multi-electrolyte 125 mL/hr Last Rate: 125 mL/hr (11/16/17 0051)   sodium chloride 125 mL/hr Last Rate: Stopped (11/16/17 0617)     PRN Meds:   acetaminophen    albuterol    calcium carbonate    enalaprilat    HYDROmorphone X2    magnesium hydroxide    nitroglycerin    Ondansetron X1    oxyCODONE

## 2017-11-16 NOTE — ED NOTES
EGD scheduled for 1300  Called house supervisor to let her know about bed assignment after procedure  House supervisor stated that she will call PACS to have pt have the first bed available        Gonzalez Campo RN  11/16/17 6625

## 2017-11-17 LAB
ALBUMIN SERPL BCP-MCNC: 3.3 G/DL (ref 3.5–5)
ALP SERPL-CCNC: 47 U/L (ref 46–116)
ALT SERPL W P-5'-P-CCNC: 24 U/L (ref 12–78)
ANION GAP SERPL CALCULATED.3IONS-SCNC: 6 MMOL/L (ref 4–13)
AST SERPL W P-5'-P-CCNC: 14 U/L (ref 5–45)
BASOPHILS # BLD AUTO: 0.02 THOUSANDS/ΜL (ref 0–0.1)
BASOPHILS NFR BLD AUTO: 0 % (ref 0–1)
BILIRUB SERPL-MCNC: 0.4 MG/DL (ref 0.2–1)
BUN SERPL-MCNC: 8 MG/DL (ref 5–25)
CALCIUM SERPL-MCNC: 8.7 MG/DL (ref 8.3–10.1)
CHLORIDE SERPL-SCNC: 106 MMOL/L (ref 100–108)
CO2 SERPL-SCNC: 29 MMOL/L (ref 21–32)
CREAT SERPL-MCNC: 0.72 MG/DL (ref 0.6–1.3)
EOSINOPHIL # BLD AUTO: 0.2 THOUSAND/ΜL (ref 0–0.61)
EOSINOPHIL NFR BLD AUTO: 3 % (ref 0–6)
ERYTHROCYTE [DISTWIDTH] IN BLOOD BY AUTOMATED COUNT: 12 % (ref 11.6–15.1)
GFR SERPL CREATININE-BSD FRML MDRD: 113 ML/MIN/1.73SQ M
GLUCOSE SERPL-MCNC: 119 MG/DL (ref 65–140)
GLUCOSE SERPL-MCNC: 77 MG/DL (ref 65–140)
HCT VFR BLD AUTO: 38.7 % (ref 36.5–49.3)
HGB BLD-MCNC: 12.9 G/DL (ref 12–17)
LYMPHOCYTES # BLD AUTO: 0.91 THOUSANDS/ΜL (ref 0.6–4.47)
LYMPHOCYTES NFR BLD AUTO: 15 % (ref 14–44)
MCH RBC QN AUTO: 31.5 PG (ref 26.8–34.3)
MCHC RBC AUTO-ENTMCNC: 33.3 G/DL (ref 31.4–37.4)
MCV RBC AUTO: 95 FL (ref 82–98)
MONOCYTES # BLD AUTO: 0.64 THOUSAND/ΜL (ref 0.17–1.22)
MONOCYTES NFR BLD AUTO: 11 % (ref 4–12)
NEUTROPHILS # BLD AUTO: 4.1 THOUSANDS/ΜL (ref 1.85–7.62)
NEUTS SEG NFR BLD AUTO: 69 % (ref 43–75)
NRBC BLD AUTO-RTO: 0 /100 WBCS
PLATELET # BLD AUTO: 150 THOUSANDS/UL (ref 149–390)
PMV BLD AUTO: 10.8 FL (ref 8.9–12.7)
POTASSIUM SERPL-SCNC: 4.1 MMOL/L (ref 3.5–5.3)
PROT SERPL-MCNC: 6.5 G/DL (ref 6.4–8.2)
RBC # BLD AUTO: 4.09 MILLION/UL (ref 3.88–5.62)
SODIUM SERPL-SCNC: 141 MMOL/L (ref 136–145)
WBC # BLD AUTO: 5.91 THOUSAND/UL (ref 4.31–10.16)

## 2017-11-17 PROCEDURE — 82948 REAGENT STRIP/BLOOD GLUCOSE: CPT

## 2017-11-17 PROCEDURE — 85025 COMPLETE CBC W/AUTO DIFF WBC: CPT | Performed by: GENERAL PRACTICE

## 2017-11-17 PROCEDURE — C9113 INJ PANTOPRAZOLE SODIUM, VIA: HCPCS | Performed by: PHYSICIAN ASSISTANT

## 2017-11-17 PROCEDURE — 93005 ELECTROCARDIOGRAM TRACING: CPT | Performed by: NURSE PRACTITIONER

## 2017-11-17 PROCEDURE — 80053 COMPREHEN METABOLIC PANEL: CPT | Performed by: GENERAL PRACTICE

## 2017-11-17 RX ORDER — SUCRALFATE ORAL 1 G/10ML
1000 SUSPENSION ORAL
Status: DISCONTINUED | OUTPATIENT
Start: 2017-11-17 | End: 2017-11-18 | Stop reason: HOSPADM

## 2017-11-17 RX ORDER — METOCLOPRAMIDE HYDROCHLORIDE 5 MG/ML
5 INJECTION INTRAMUSCULAR; INTRAVENOUS ONCE
Status: COMPLETED | OUTPATIENT
Start: 2017-11-17 | End: 2017-11-17

## 2017-11-17 RX ADMIN — HYDROMORPHONE HYDROCHLORIDE 1 MG: 1 INJECTION, SOLUTION INTRAMUSCULAR; INTRAVENOUS; SUBCUTANEOUS at 06:36

## 2017-11-17 RX ADMIN — SUCRALFATE 1000 MG: 1 SUSPENSION ORAL at 12:56

## 2017-11-17 RX ADMIN — HYDROMORPHONE HYDROCHLORIDE 1 MG: 1 INJECTION, SOLUTION INTRAMUSCULAR; INTRAVENOUS; SUBCUTANEOUS at 22:39

## 2017-11-17 RX ADMIN — SUCRALFATE 1000 MG: 1 SUSPENSION ORAL at 16:04

## 2017-11-17 RX ADMIN — METOCLOPRAMIDE 5 MG: 5 INJECTION, SOLUTION INTRAMUSCULAR; INTRAVENOUS at 12:55

## 2017-11-17 RX ADMIN — NICOTINE 1 PATCH: 14 PATCH TRANSDERMAL at 08:00

## 2017-11-17 RX ADMIN — PANCRELIPASE 12000 UNITS: 30000; 6000; 19000 CAPSULE, DELAYED RELEASE PELLETS ORAL at 17:49

## 2017-11-17 RX ADMIN — HYDROMORPHONE HYDROCHLORIDE 1 MG: 1 INJECTION, SOLUTION INTRAMUSCULAR; INTRAVENOUS; SUBCUTANEOUS at 12:57

## 2017-11-17 RX ADMIN — HYDROMORPHONE HYDROCHLORIDE 1 MG: 1 INJECTION, SOLUTION INTRAMUSCULAR; INTRAVENOUS; SUBCUTANEOUS at 09:57

## 2017-11-17 RX ADMIN — HYDROMORPHONE HYDROCHLORIDE 1 MG: 1 INJECTION, SOLUTION INTRAMUSCULAR; INTRAVENOUS; SUBCUTANEOUS at 16:04

## 2017-11-17 RX ADMIN — PANTOPRAZOLE SODIUM 40 MG: 40 INJECTION, POWDER, FOR SOLUTION INTRAVENOUS at 08:01

## 2017-11-17 RX ADMIN — ATORVASTATIN CALCIUM 40 MG: 40 TABLET, FILM COATED ORAL at 16:04

## 2017-11-17 RX ADMIN — TRAZODONE HYDROCHLORIDE 50 MG: 50 TABLET ORAL at 21:51

## 2017-11-17 RX ADMIN — PANTOPRAZOLE SODIUM 40 MG: 40 INJECTION, POWDER, FOR SOLUTION INTRAVENOUS at 19:33

## 2017-11-17 RX ADMIN — ENOXAPARIN SODIUM 40 MG: 40 INJECTION SUBCUTANEOUS at 08:00

## 2017-11-17 RX ADMIN — PANCRELIPASE 12000 UNITS: 30000; 6000; 19000 CAPSULE, DELAYED RELEASE PELLETS ORAL at 08:01

## 2017-11-17 RX ADMIN — ASPIRIN 325 MG: 325 TABLET ORAL at 08:01

## 2017-11-17 RX ADMIN — HYDROMORPHONE HYDROCHLORIDE 1 MG: 1 INJECTION, SOLUTION INTRAMUSCULAR; INTRAVENOUS; SUBCUTANEOUS at 03:33

## 2017-11-17 RX ADMIN — NIACIN 500 MG: 500 TABLET, FILM COATED, EXTENDED RELEASE ORAL at 08:01

## 2017-11-17 RX ADMIN — OXYCODONE HYDROCHLORIDE 5 MG: 5 TABLET ORAL at 23:23

## 2017-11-17 RX ADMIN — HYDROMORPHONE HYDROCHLORIDE 1 MG: 1 INJECTION, SOLUTION INTRAMUSCULAR; INTRAVENOUS; SUBCUTANEOUS at 19:32

## 2017-11-17 NOTE — ASSESSMENT & PLAN NOTE
· Present on admission  With RBBB  · Denies Chest Pain  Troponin series negative  · Will repeat EKG

## 2017-11-17 NOTE — PLAN OF CARE
DISCHARGE PLANNING     Discharge to home or other facility with appropriate resources Progressing        GASTROINTESTINAL - ADULT     Minimal or absence of nausea and/or vomiting Progressing        INFECTION - ADULT     Absence or prevention of progression during hospitalization Progressing        Knowledge Deficit     Patient/family/caregiver demonstrates understanding of disease process, treatment plan, medications, and discharge instructions Progressing        PAIN - ADULT     Verbalizes/displays adequate comfort level or baseline comfort level Progressing        SAFETY ADULT     Patient will remain free of falls Progressing     Maintain or return to baseline ADL function Progressing     Maintain or return mobility status to optimal level Progressing

## 2017-11-17 NOTE — PROGRESS NOTES
Progress Note - Cindy Costar 38 y o  male MRN: [id number]    Unit/Bed#: -01 Encounter: [id number]        * Epigastric abdominal pain   Assessment & Plan    · Likely secondary to Acute gastritis  Hx of pancreatic lesion/Renal Mass  Patient not tolerating p o  intake well  He reports worsening of abdominal pain and nausea after breakfast   · CT reviewed note, Severe nonspecific gastritis with thickening extending to the GE junction   Minimal subdiaphragmatic free fluid extends through the esophageal hiatus into the right posterior mediastinum   Segmental patchy hypoenhancement of the gastric cardia and fundus is nonspecific   Intramural phlegmon, early necrosis, or infiltrative process may have this appearance  Worsen Hepatomegaly measuring 24 cm  · GI following thank you for treatment and recommendation plan  · Continue PPI, Carafate  HLD (hyperlipidemia)   Assessment & Plan    · Continue Niacin and statin  Chest pain   Assessment & Plan    · Present on admission  With RBBB  · Denies Chest Pain  Troponin series negative  · Will repeat EKG  RBBB   Assessment & Plan    · On admission  · Will repeat EKG if persistent will consult Cardiology for further management  Leukocytosis   Assessment & Plan    POA  Resolved today  Smoker   Assessment & Plan    · Smoking cessation done  · Continue nicotine patch  VTE Pharmacologic Prophylaxis:   Pharmacologic: Enoxaparin (Lovenox)  Mechanical: Mechanical VTE prophylaxis in place  Patient Centered Rounds: I have performed bedside rounds with nursing staff today  Discussions with Specialists or Other Care Team Provider: GI, Primary Nurse, Case management  Education and Discussions with Family / Patient: Patient  Time Spent for Care: 36  More than 50% of total time spent on counseling and coordination of care as described above      Current Length of Stay: 1 day(s)  Current Patient Status: Inpatient Certification Statement: The patient will continue to require additional inpatient hospital stay due to Persistent epigastric Pain, not tolerating meals  Discharge Plan: Possibly adwoa  Code Status: Level 1 - Full Code    Subjective: " Sucks"  Pt is awake alert oriented x3  Patient report persistent pain, nausea a specially after eating breakfast this morning  Objective:   Vitals:   Temp (24hrs), Av 3 °F (36 8 °C), Min:97 8 °F (36 6 °C), Max:98 8 °F (37 1 °C)    HR:  [64-79] 67  Resp:  [16-18] 18  BP: ()/(50-78) 120/73  SpO2:  [92 %-100 %] 92 %  Body mass index is 28 56 kg/m²  Input and Output Summary (last 24 hours): Intake/Output Summary (Last 24 hours) at 17 1319  Last data filed at 17   Gross per 24 hour   Intake             1010 ml   Output                0 ml   Net             1010 ml       Physical Exam:     Physical Exam   Constitutional: He is oriented to person, place, and time  He appears well-developed  HENT:   Head: Normocephalic and atraumatic  Neck: Normal range of motion  Neck supple  Cardiovascular: Normal rate, regular rhythm and normal heart sounds  No murmur heard  Pulmonary/Chest: Effort normal and breath sounds normal  No respiratory distress  Abdominal: Soft  Bowel sounds are normal  There is tenderness (Across the upper portion of abdomen  )  Musculoskeletal: Normal range of motion  Neurological: He is alert and oriented to person, place, and time  Skin: Skin is warm and dry  Psychiatric: He has a normal mood and affect  Nursing note and vitals reviewed        Additional Data:   Labs:    Results from last 7 days  Lab Units 17  0505   WBC Thousand/uL 5 91   HEMOGLOBIN g/dL 12 9   HEMATOCRIT % 38 7   PLATELETS Thousands/uL 150   NEUTROS PCT % 69   LYMPHS PCT % 15   MONOS PCT % 11   EOS PCT % 3       Results from last 7 days  Lab Units 17  0505   SODIUM mmol/L 141   POTASSIUM mmol/L 4 1   CHLORIDE mmol/L 106   CO2 mmol/L 29   BUN mg/dL 8   CREATININE mg/dL 0 72   CALCIUM mg/dL 8 7   TOTAL PROTEIN g/dL 6 5   BILIRUBIN TOTAL mg/dL 0 40   ALK PHOS U/L 47   ALT U/L 24   AST U/L 14   GLUCOSE RANDOM mg/dL 119       Results from last 7 days  Lab Units 11/16/17  0539   INR  1 12       * I Have Reviewed All Lab Data Listed Above  * Additional Pertinent Lab Tests Reviewed: [facility] [facility] Admission Reviewed    Imaging:    Imaging Reports Reviewed Today Include: CT     Cultures:   Blood Culture:   Lab Results   Component Value Date    BLOODCX No Growth After 5 Days  03/02/2017    BLOODCX No Growth After 5 Days  03/02/2017     Urine Culture: No results found for: URINECX  Sputum Culture: No components found for: SPUTUMCX  Wound Culture: No results found for: WOUNDCULT    Last 24 Hours Medication List:     aspirin 325 mg Oral Daily   atorvastatin 40 mg Oral Daily With Dinner   enoxaparin 40 mg Subcutaneous Daily   niacin 500 mg Oral Daily With Breakfast   nicotine 1 patch Transdermal Daily   pancrelipase (Lip-Prot-Amyl) 12,000 Units Oral TID With Meals   pantoprazole 40 mg Intravenous Q12H [facility]   sucralfate 1,000 mg Oral BID AC   traZODone 50 mg Oral HS        Today, Patient Was Seen By: LUANA Harvey    ** Please Note: Dragon 360 Dictation voice to text software may have been used in the creation of this document   **

## 2017-11-17 NOTE — PROGRESS NOTES
GI Progress Note - Oral Jackson 38 y o  male MRN: [id number]    Unit/Bed#: -01 Encounter: [id number]    Subjective: Mr Estephania Man felt okay yesterday after the procedure, but after breakfast this AM he had more epigastric discomfort, early satiety, and bloating  He had a normal bowel movement on 11/15  Passing gas  No nausea or vomiting  Objective:     Vitals: Blood pressure 120/73, pulse 67, temperature 98 °F (36 7 °C), temperature source Oral, resp  rate 18, height 5' 11" (1 803 m), weight 92 9 kg (204 lb 12 9 oz), SpO2 92 %  ,Body mass index is 28 56 kg/m²        Intake/Output Summary (Last 24 hours) at 11/17/17 1512  Last data filed at 11/16/17 2021   Gross per 24 hour   Intake              860 ml   Output                0 ml   Net              860 ml       Physical Exam:     General Appearance: Alert, no acute distress  Lungs: CTA, no respiratory distress  Heart: RRR, no murmur  Abdomen: Non-distended, soft, BS active, (+) TTP in the epigastric region  Extremities: No cyanosis or edema    Invasive Devices     Peripheral Intravenous Line            Peripheral IV 11/15/17 Left Antecubital 1 day                Lab Results:    Results from last 7 days  Lab Units 11/17/17  0505   WBC Thousand/uL 5 91   HEMOGLOBIN g/dL 12 9   HEMATOCRIT % 38 7   PLATELETS Thousands/uL 150   NEUTROS PCT % 69   LYMPHS PCT % 15   MONOS PCT % 11   EOS PCT % 3       Results from last 7 days  Lab Units 11/17/17  0505   SODIUM mmol/L 141   POTASSIUM mmol/L 4 1   CHLORIDE mmol/L 106   CO2 mmol/L 29   BUN mg/dL 8   CREATININE mg/dL 0 72   CALCIUM mg/dL 8 7   TOTAL PROTEIN g/dL 6 5   BILIRUBIN TOTAL mg/dL 0 40   ALK PHOS U/L 47   ALT U/L 24   AST U/L 14   GLUCOSE RANDOM mg/dL 119       Results from last 7 days  Lab Units 11/16/17  0539   INR  1 12       Results from last 7 days  Lab Units 11/16/17  0539 11/15/17  2227   LIPASE u/L  --  335   AMYLASE IU/L 54  --        Imaging Studies: I have personally reviewed pertinent imaging studies  Pe Study With Ct Abdomen & Pelvis With Contrast  Result Date: 11/16/2017  Impression: CTA chest: No pulmonary embolus or acute intrathoracic process  CT abdomen and pelvis: Severe nonspecific gastritis with thickening extending to the GE junction  Minimal subdiaphragmatic free fluid extends through the esophageal hiatus into the right posterior mediastinum  Segmental patchy hypoenhancement of the gastric cardia and fundus  is nonspecific  Intramural phlegmon, early necrosis, or infiltrative process may have this appearance  No bowel obstruction, free gas, or emphysematous gastritis  Focal hypodensity reported in the tail of the pancreas by [facility] radiologist appears to be posterior to the pancreatic parenchyma rather than within the parenchyma images 80-82 series 606  Hepatomegaly measuring 24 cm craniocaudal has progressed since August 14, 2017  The major findings are in agreement with the preliminary report provided by [facility] which was provided shortly after completion of the exam  The additional finding of  progressive hepatomegaly   will now be communicated with patient's clinical  team by our radiology liaison        Assessment and Plan:       Epigastric Abdominal Pain  Severe Gastritis  - Suspect his abdominal pain is from severe gastritis due to CT findings  - S/P EGD yesterday showing severe esophagitis, slightly prominent folds in the fundus, s/p random biopsies  - F/U biopsies in 1-2 weeks  - Continue protonix 40 mg BID for now, add carafate 1 g BID  - Diet as tolerated, recommend small meals low in fat  - Repeat EGD in 3 months  - Pt stable for discharge from GI standpoint, can follow up as outpatient, suspect symptoms to slowly improve with PPI/carafate and supportive care       Focal Hypodensity Posterior to Pancreatic Tail  Hx Chronic Pancreatitis  - Hypodensity noted in the tail of the pancreas v posterior to the pancreatic parenchyma  - Unclear if this could be fatty tissue v scarring  - He will need MRI/MRCP for further clarification as outpatient  - Continue creon supplements with meals  - TG WNL, lipase and LFTs WNL        The patient was seen by Dr Lindsay Leon  GI will sign off  Call with questions

## 2017-11-17 NOTE — CASE MANAGEMENT
[address] [place] [address] in the [employer] [facility] [employer] [facility] for 2017  Network Utilization Review Department  Phone: [phone]; Fax [phone]  ATTENTION: The Network Utilization Review Department is now centralized for our 7 Facilities  Make a note that we have a new phone and fax numbers for our Department  Please call with any questions or concerns to [phone] and carefully follow the prompts so that you are directed to the right person  All voicemails are confidential  Fax any determinations, approvals, denials, and requests for initial or continue stay review clinical to [phone]  Due to HIGH CALL volume, it would be easier if you could please send faxed requests to expedite your requests and in part, help us provide discharge notifications faster    Continued Stay Review    Date: 11/17  Vital Signs: /73   Pulse 67   Temp 98 °F (36 7 °C) (Oral)   Resp 18   Ht 5' 11" (1 803 m)   Wt 92 9 kg (204 lb 12 9 oz)   SpO2 92%   BMI 28 56 kg/m²     Medications:   Scheduled Meds:   aspirin 325 mg Oral Daily   atorvastatin 40 mg Oral Daily With Dinner   enoxaparin 40 mg Subcutaneous Daily   niacin 500 mg Oral Daily With Breakfast   nicotine 1 patch Transdermal Daily   pancrelipase (Lip-Prot-Amyl) 12,000 Units Oral TID With Meals   pantoprazole 40 mg Intravenous Q12H [address]   sucralfate 1,000 mg Oral BID AC   traZODone 50 mg Oral HS     Continuous Infusions:    PRN Meds:   acetaminophen    albuterol    calcium carbonate    enalaprilat    HYDROmorphone    magnesium hydroxide    nitroglycerin    ondansetron    oxyCODONE    Abnormal Labs/Diagnostic Results: alb  3 3    Age/Sex: 38 y o  male     Assessment/Plan:     Discharge Plan:  TBD     GI consult 11/16  Epigastric Abdominal Pain  Severe Gastritis  - Suspect his abdominal pain is from severe gastritis extending to the GE junction noted on CT with minimal subdiaphragmatic free fluid extending through the esophageal hiatus into the posterior mediastinum; also with patchy gastric cardia and fundus  - Trops neg, EKG unremarkable  - Diff diagnosis includes severe reflux induced gastritis/esophagitis v Soriano's v H  Pylori v malignancy v reactive etiology from acute on chronic pancreatitis  - No free air or obstruction noted on CT  - Keep NPO  - Plan for EGD today to evaluate the gastritis and biopsy  - Continue protonix BID  - Further recommendations pending EGD   Focal Hypodensity Posterior to Pancreatic Tail  Hx Chronic Pancreatitis  - Hypodensity noted in the tail of the pancreas v posterior to the pancreatic parenchyma  - Unclear if this could be fatty tissue v scarring  - He will likely need MRI/MRCP for further clarification  - Continue creon supplements with meals once he starts taking PO  - Check TG  - Lipase and LFTs normal on admission with no objective signs of acute pancreatitis          EGD 11/16  FINDINGS:   Esophagus: LA Class C esophagitis was found in the distal   third of the esophagus  Multiple random biopsies was taken from the   distal third of the esophagus  Stomach: Slightly prominent folds in the   fundus  Multiple random biopsies was taken  The antrum appeared to be   normal   Pylorus: The pylorus appeared to be normal, symmetrical, and   patent  Duodenum: The duodenal bulb and 2nd portion of the duodenum   appeared to be normal      COMPLICATIONS: There were no complications  IMPRESSIONS: Esophagitis seen in the distal third of the esophagus  Slightly prominent folds in the fundus maybe due to recent   nausea/vomiting  Normal antrum  Normal, symmetrical, and patent pylorus  Normal duodenal bulb and 2nd portion of the duodenum  RECOMMENDATIONS: Avoid all non-steroidal anti-inflammatory drugs (NSAID's)   including but not limited to Ibuprofen, Advil, Motrin, and Nuprin  Start   anti-reflux diet  Continue taking the following medications as prescribed:   Protonix   EGD recommended in 3 months

## 2017-11-17 NOTE — ASSESSMENT & PLAN NOTE
· Likely secondary to Acute gastritis  Hx of pancreatic lesion/Renal Mass  Patient not tolerating p o  intake well  He reports worsening of abdominal pain and nausea after breakfast   · CT reviewed note, Severe nonspecific gastritis with thickening extending to the GE junction   Minimal subdiaphragmatic free fluid extends through the esophageal hiatus into the right posterior mediastinum   Segmental patchy hypoenhancement of the gastric cardia and fundus is nonspecific   Intramural phlegmon, early necrosis, or infiltrative process may have this appearance  Worsen Hepatomegaly measuring 24 cm  · GI following thank you for treatment and recommendation plan  · Continue PPI, Carafate

## 2017-11-18 VITALS
RESPIRATION RATE: 18 BRPM | HEART RATE: 78 BPM | TEMPERATURE: 98.6 F | SYSTOLIC BLOOD PRESSURE: 137 MMHG | WEIGHT: 204.81 LBS | OXYGEN SATURATION: 98 % | BODY MASS INDEX: 28.67 KG/M2 | DIASTOLIC BLOOD PRESSURE: 75 MMHG | HEIGHT: 71 IN

## 2017-11-18 PROBLEM — R79.89 POSITIVE D DIMER: Status: RESOLVED | Noted: 2017-11-16 | Resolved: 2017-11-18

## 2017-11-18 PROBLEM — D72.829 LEUKOCYTOSIS: Status: RESOLVED | Noted: 2017-11-16 | Resolved: 2017-11-18

## 2017-11-18 PROBLEM — E78.5 HLD (HYPERLIPIDEMIA): Status: RESOLVED | Noted: 2017-11-16 | Resolved: 2017-11-18

## 2017-11-18 PROBLEM — R07.9 CHEST PAIN: Status: RESOLVED | Noted: 2017-11-16 | Resolved: 2017-11-18

## 2017-11-18 PROBLEM — R10.13 EPIGASTRIC ABDOMINAL PAIN: Status: RESOLVED | Noted: 2017-11-16 | Resolved: 2017-11-18

## 2017-11-18 LAB
ATRIAL RATE: 69 BPM
P AXIS: 44 DEGREES
PR INTERVAL: 150 MS
QRS AXIS: 23 DEGREES
QRSD INTERVAL: 104 MS
QT INTERVAL: 382 MS
QTC INTERVAL: 409 MS
T WAVE AXIS: 20 DEGREES
VENTRICULAR RATE: 69 BPM

## 2017-11-18 PROCEDURE — C9113 INJ PANTOPRAZOLE SODIUM, VIA: HCPCS | Performed by: PHYSICIAN ASSISTANT

## 2017-11-18 RX ORDER — OXYCODONE HYDROCHLORIDE 5 MG/1
5 TABLET ORAL EVERY 4 HOURS PRN
Qty: 20 TABLET | Refills: 0 | Status: SHIPPED | OUTPATIENT
Start: 2017-11-18 | End: 2017-11-28 | Stop reason: ALTCHOICE

## 2017-11-18 RX ORDER — SUCRALFATE ORAL 1 G/10ML
1000 SUSPENSION ORAL
Qty: 420 ML | Refills: 0 | Status: ON HOLD | OUTPATIENT
Start: 2017-11-18 | End: 2018-04-18

## 2017-11-18 RX ORDER — PANTOPRAZOLE SODIUM 40 MG/1
40 TABLET, DELAYED RELEASE ORAL
Status: DISCONTINUED | OUTPATIENT
Start: 2017-11-18 | End: 2017-11-18 | Stop reason: HOSPADM

## 2017-11-18 RX ORDER — PANTOPRAZOLE SODIUM 40 MG/1
40 TABLET, DELAYED RELEASE ORAL 2 TIMES DAILY
Qty: 60 TABLET | Refills: 0 | Status: SHIPPED | OUTPATIENT
Start: 2017-11-18 | End: 2018-02-22 | Stop reason: SDUPTHER

## 2017-11-18 RX ORDER — NIACIN 500 MG/1
500 TABLET, EXTENDED RELEASE ORAL
Qty: 30 TABLET | Refills: 0 | Status: SHIPPED | OUTPATIENT
Start: 2017-11-19 | End: 2018-03-08 | Stop reason: SDUPTHER

## 2017-11-18 RX ADMIN — PANTOPRAZOLE SODIUM 40 MG: 40 INJECTION, POWDER, FOR SOLUTION INTRAVENOUS at 08:08

## 2017-11-18 RX ADMIN — ASPIRIN 325 MG: 325 TABLET ORAL at 08:08

## 2017-11-18 RX ADMIN — ONDANSETRON 4 MG: 2 INJECTION INTRAMUSCULAR; INTRAVENOUS at 02:34

## 2017-11-18 RX ADMIN — HYDROMORPHONE HYDROCHLORIDE 1 MG: 1 INJECTION, SOLUTION INTRAMUSCULAR; INTRAVENOUS; SUBCUTANEOUS at 13:49

## 2017-11-18 RX ADMIN — HYDROMORPHONE HYDROCHLORIDE 1 MG: 1 INJECTION, SOLUTION INTRAMUSCULAR; INTRAVENOUS; SUBCUTANEOUS at 10:31

## 2017-11-18 RX ADMIN — NIACIN 500 MG: 500 TABLET, FILM COATED, EXTENDED RELEASE ORAL at 08:07

## 2017-11-18 RX ADMIN — NICOTINE 1 PATCH: 14 PATCH TRANSDERMAL at 08:09

## 2017-11-18 RX ADMIN — ENOXAPARIN SODIUM 40 MG: 40 INJECTION SUBCUTANEOUS at 08:08

## 2017-11-18 RX ADMIN — PANCRELIPASE 12000 UNITS: 30000; 6000; 19000 CAPSULE, DELAYED RELEASE PELLETS ORAL at 08:09

## 2017-11-18 RX ADMIN — HYDROMORPHONE HYDROCHLORIDE 1 MG: 1 INJECTION, SOLUTION INTRAMUSCULAR; INTRAVENOUS; SUBCUTANEOUS at 07:29

## 2017-11-18 RX ADMIN — SUCRALFATE 1000 MG: 1 SUSPENSION ORAL at 07:30

## 2017-11-18 RX ADMIN — HYDROMORPHONE HYDROCHLORIDE 1 MG: 1 INJECTION, SOLUTION INTRAMUSCULAR; INTRAVENOUS; SUBCUTANEOUS at 01:28

## 2017-11-18 RX ADMIN — PANCRELIPASE 12000 UNITS: 30000; 6000; 19000 CAPSULE, DELAYED RELEASE PELLETS ORAL at 12:31

## 2017-11-18 NOTE — PROGRESS NOTES
[facility] Progress Note  Patient: Suly Villarreal 38 y o  male   MRN: [id number]  PCP: Paige Moulton MD  Unit/Bed#: -01 Encounter: [id number]  Date Of Visit: 11/18/17    Assessment:    Principal Problem:    Epigastric abdominal pain  Active Problems:    Pancreatic lesion    Smoker    Leukocytosis    Positive D dimer    RBBB    Chest pain    HLD (hyperlipidemia)    Epigastric pain    Acute gastritis without hemorrhage      Plan:    Assessment & Plan     · Likely secondary to Acute gastritis  Hx of chronic pancreatitis   egd yesterday with esophagitis-avoid nsaids;   · Continue PPI, Carafate        HLD (hyperlipidemia)   Assessment & Plan     · Continue Niacin and statin         Chest pain   Assessment & Plan     · resolved         RBBB   Assessment & Plan     Cp resolved         Leukocytosis   Assessment & Plan     POA  Resolved        Smoker   Assessment & Plan     · Smoking cessation done  · Continue nicotine patch           Chronic pancreatitis due to hypertriglyceridemia-follows with dr Velia Elizondo as outpatient-needs mri/mrcp to follow up pancreatic tail lesion-he has fup appt already pending dr Velia Elizondo 11/28/17      VTE Pharmacologic Prophylaxis:   Pharmacologic: Enoxaparin (Lovenox)  Mechanical VTE Prophylaxis in Place: Yes    Patient Centered Rounds: I have performed bedside rounds with nursing staff today  Discussions with Specialists or Other Care Team Provider:     Education and Discussions with Family / Patient:     Time Spent for Care: 30 minutes  More than 50% of total time spent on counseling and coordination of care as described above  Current Length of Stay: 2 day(s)    Current Patient Status: Inpatient   Certification Statement: The patient will continue to require additional inpatient hospital stay due to likely discharge today    Discharge Plan: home    Code Status: Level 1 - Full Code      Subjective:    Tolerating clear liquids-had some abdominal pain last evening  Objective:     Vitals:   Temp (24hrs), Av 8 °F (37 1 °C), Min:98 2 °F (36 8 °C), Max:99 6 °F (37 6 °C)    HR:  [68-85] 78  Resp:  [18] 18  BP: (129-145)/(67-94) 137/75  SpO2:  [93 %-98 %] 98 %  Body mass index is 28 56 kg/m²  Input and Output Summary (last 24 hours):     No intake or output data in the 24 hours ending 17 0845    Physical Exam:     Physical Exam   Constitutional: He is oriented to person, place, and time  He appears well-developed and well-nourished  HENT:   Head: Normocephalic  Eyes: Pupils are equal, round, and reactive to light  Cardiovascular: Normal rate and regular rhythm  Pulmonary/Chest: Effort normal and breath sounds normal    Abdominal: Soft  There is no tenderness  Musculoskeletal: He exhibits no edema  Neurological: He is alert and oriented to person, place, and time  Additional Data:     Labs:      Results from last 7 days  Lab Units 17  0505   WBC Thousand/uL 5 91   HEMOGLOBIN g/dL 12 9   HEMATOCRIT % 38 7   PLATELETS Thousands/uL 150   NEUTROS PCT % 69   LYMPHS PCT % 15   MONOS PCT % 11   EOS PCT % 3       Results from last 7 days  Lab Units 17  0505   SODIUM mmol/L 141   POTASSIUM mmol/L 4 1   CHLORIDE mmol/L 106   CO2 mmol/L 29   BUN mg/dL 8   CREATININE mg/dL 0 72   CALCIUM mg/dL 8 7   TOTAL PROTEIN g/dL 6 5   BILIRUBIN TOTAL mg/dL 0 40   ALK PHOS U/L 47   ALT U/L 24   AST U/L 14   GLUCOSE RANDOM mg/dL 119       Results from last 7 days  Lab Units 17  0539   INR  1 12       * I Have Reviewed All Lab Data Listed Above  * Additional Pertinent Lab Tests Reviewed:  All Labs Within Last 24 Hours Reviewed    Imaging:    Imaging Reports Reviewed Today Include:   Imaging Personally Reviewed by Myself Includes:      Recent Cultures (last 7 days):           Last 24 Hours Medication List:     aspirin 325 mg Oral Daily   atorvastatin 40 mg Oral Daily With Dinner   enoxaparin 40 mg Subcutaneous Daily   niacin 500 mg Oral Daily With Breakfast   nicotine 1 patch Transdermal Daily   pancrelipase (Lip-Prot-Amyl) 12,000 Units Oral TID With Meals   pantoprazole 40 mg Intravenous Q12H [address]   sucralfate 1,000 mg Oral BID AC   traZODone 50 mg Oral HS        Today, Patient Was Seen By: Richard Melgar MD    ** Please Note: Dragon 360 Dictation voice to text software may have been used in the creation of this document   **

## 2017-11-18 NOTE — DISCHARGE SUMMARY
Discharge Summary - [address] [facility]    Patient Information: Quoc Whiting 38 y o  male MRN: [id number]  Unit/Bed#: -01 Encounter: [id number]    Discharging Physician / Practitioner: Nickolas Amato MD  PCP: Ki Roger MD  Admission Date: 11/15/2017  Discharge Date: 11/18/17    Reason for Admission: abdominal pain    Discharge Diagnoses:     Principal Problem (Resolved):    Epigastric abdominal pain  Active Problems:    Pancreatic lesion    Smoker    RBBB    Epigastric pain    Acute gastritis without hemorrhage  Resolved Problems:    Leukocytosis    Positive D dimer    Chest pain    HLD (hyperlipidemia)      Consultations During Hospital Stay:  · GI    Procedures Performed:     · EGD    Significant Findings:     · esophagitis    Incidental Findings:   ·      Test Results Pending at Discharge (will require follow up): · Biopsies from egd     Outpatient Tests Requested:  · MRI/MRCP-will schedule through GI    Complications:      Hospital Course:     Quoc Whiting is a 38 y o  male patient who originally presented to the hospital on 11/15/2017 due to epigastric pain  Patient has a known history of chronic pancreatitis and follows with Gastroenterology for this  He came to the emergency department as he developed epigastric pain similar to his prior pancreatitis pain in the emergency department CT did not show pancreatitis but showed a tail pancreatic mass and severe nonspecific gastritis with thickening to the GE junction and intramural phlegmon early necrosis or infiltrative process may have similar appearance he was admitted and followed up by GI he had an EGD performed on November 16th which showed esophagitis in the distal 3rd of the esophagus slightly prominent folds in the fundus may be due to recent nausea vomiting  He was started on Carafate    His diet was advanced and his abdominal pain had improved by the time of discharge    Condition at Discharge: stable     Discharge Day Visit / Exam:     * Please refer to separate progress for these details *    Discharge instructions/Information to patient and family:   See after visit summary for information provided to patient and family  Provisions for Follow-Up Care:  See after visit summary for information related to follow-up care and any pertinent home health orders  Disposition:     Home    For Discharges to [facility] SNF:   · Not Applicable to this Patient - Not Applicable to this Patient    Planned Readmission:     Discharge Statement:  I spent 40 minutes discharging the patient  This time was spent on the day of discharge  I had direct contact with the patient on the day of discharge  Greater than 50% of the total time was spent examining patient, answering all patient questions, arranging and discussing plan of care with patient as well as directly providing post-discharge instructions  Additional time then spent on discharge activities  Discharge Medications:  See after visit summary for reconciled discharge medications provided to patient and family  ** Please Note: Dragon 360 Dictation voice to text software may have been used in the creation of this document   **

## 2017-11-18 NOTE — PROGRESS NOTES
The pantoprazole has / have been converted to Oral per [facility] IV-to[facility]PO Auto-Conversion Protocol for Adults as approved by the Pharmacy and Therapeutics Committee  The patient met all eligible criteria:  3 Age = 25years old   2) Received at least one dose of the IV form   3) Receiving at least one other scheduled oral/enteral medication   4) Tolerating an oral/enteral diet   and did not have any exclusions:   1) Critical care patient   2) Active GI bleed (IF assessing H2RAs or PPIs)   3) Continuous tube feeding (IF assessing cipro, doxycycline, levofloxacin, minocycline, rifampin, or voriconazole)   4) Receiving PO vancomycin (IF assessing metronidazole)   5) Persistent nausea and/or vomiting   6) Ileus or gastrointestinal obstruction   7) Kaleigh/nasogastric tube set for continuous suction   8) Specific order not to automatically convert to PO (in the order's comments or if discussed in the most recent Infectious Disease or primary team's progress notes)

## 2017-11-26 ENCOUNTER — GENERIC CONVERSION - ENCOUNTER (OUTPATIENT)
Dept: OTHER | Facility: OTHER | Age: 42
End: 2017-11-26

## 2017-11-28 ENCOUNTER — ALLSCRIPTS OFFICE VISIT (OUTPATIENT)
Dept: OTHER | Facility: OTHER | Age: 42
End: 2017-11-28

## 2017-11-29 ENCOUNTER — HOSPITAL ENCOUNTER (EMERGENCY)
Facility: HOSPITAL | Age: 42
Discharge: HOME | End: 2017-11-29
Attending: EMERGENCY MEDICINE | Admitting: EMERGENCY MEDICINE
Payer: COMMERCIAL

## 2017-11-29 VITALS
SYSTOLIC BLOOD PRESSURE: 142 MMHG | OXYGEN SATURATION: 96 % | HEART RATE: 74 BPM | RESPIRATION RATE: 21 BRPM | HEIGHT: 71 IN | TEMPERATURE: 99.1 F | WEIGHT: 195 LBS | BODY MASS INDEX: 27.3 KG/M2 | DIASTOLIC BLOOD PRESSURE: 93 MMHG

## 2017-11-29 DIAGNOSIS — R10.13 EPIGASTRIC PAIN: Primary | ICD-10-CM

## 2017-11-29 LAB
ALBUMIN SERPL BCP-MCNC: 3.9 G/DL (ref 3.5–5)
ALP SERPL-CCNC: 61 U/L (ref 46–116)
ALT SERPL W P-5'-P-CCNC: 24 U/L (ref 12–78)
ANION GAP SERPL CALCULATED.3IONS-SCNC: 9 MMOL/L (ref 4–13)
AST SERPL W P-5'-P-CCNC: 16 U/L (ref 5–45)
BASOPHILS # BLD AUTO: 0.08 THOUSANDS/ΜL (ref 0–0.1)
BASOPHILS NFR BLD AUTO: 1 % (ref 0–1)
BILIRUB SERPL-MCNC: 0.6 MG/DL (ref 0.2–1)
BUN SERPL-MCNC: 9 MG/DL (ref 5–25)
CALCIUM SERPL-MCNC: 9.4 MG/DL (ref 8.3–10.1)
CHLORIDE SERPL-SCNC: 100 MMOL/L (ref 100–108)
CO2 SERPL-SCNC: 29 MMOL/L (ref 21–32)
CREAT SERPL-MCNC: 0.83 MG/DL (ref 0.6–1.3)
EOSINOPHIL # BLD AUTO: 0.57 THOUSAND/ΜL (ref 0–0.61)
EOSINOPHIL NFR BLD AUTO: 4 % (ref 0–6)
ERYTHROCYTE [DISTWIDTH] IN BLOOD BY AUTOMATED COUNT: 12.2 % (ref 11.6–15.1)
GFR SERPL CREATININE-BSD FRML MDRD: 107 ML/MIN/1.73SQ M
GLUCOSE SERPL-MCNC: 105 MG/DL (ref 65–140)
HCT VFR BLD AUTO: 42.7 % (ref 36.5–49.3)
HGB BLD-MCNC: 14.5 G/DL (ref 12–17)
LIPASE SERPL-CCNC: 264 U/L (ref 73–393)
LYMPHOCYTES # BLD AUTO: 2.21 THOUSANDS/ΜL (ref 0.6–4.47)
LYMPHOCYTES NFR BLD AUTO: 16 % (ref 14–44)
MCH RBC QN AUTO: 31 PG (ref 26.8–34.3)
MCHC RBC AUTO-ENTMCNC: 34 G/DL (ref 31.4–37.4)
MCV RBC AUTO: 91 FL (ref 82–98)
MONOCYTES # BLD AUTO: 0.94 THOUSAND/ΜL (ref 0.17–1.22)
MONOCYTES NFR BLD AUTO: 7 % (ref 4–12)
NEUTROPHILS # BLD AUTO: 9.65 THOUSANDS/ΜL (ref 1.85–7.62)
NEUTS SEG NFR BLD AUTO: 71 % (ref 43–75)
NRBC BLD AUTO-RTO: 0 /100 WBCS
PLATELET # BLD AUTO: 282 THOUSANDS/UL (ref 149–390)
PMV BLD AUTO: 9.7 FL (ref 8.9–12.7)
POTASSIUM SERPL-SCNC: 3.7 MMOL/L (ref 3.5–5.3)
PROT SERPL-MCNC: 7.5 G/DL (ref 6.4–8.2)
RBC # BLD AUTO: 4.68 MILLION/UL (ref 3.88–5.62)
SODIUM SERPL-SCNC: 138 MMOL/L (ref 136–145)
TROPONIN I SERPL-MCNC: <0.02 NG/ML
WBC # BLD AUTO: 13.51 THOUSAND/UL (ref 4.31–10.16)

## 2017-11-29 PROCEDURE — 96374 THER/PROPH/DIAG INJ IV PUSH: CPT

## 2017-11-29 PROCEDURE — 36415 COLL VENOUS BLD VENIPUNCTURE: CPT | Performed by: EMERGENCY MEDICINE

## 2017-11-29 PROCEDURE — 93005 ELECTROCARDIOGRAM TRACING: CPT | Performed by: EMERGENCY MEDICINE

## 2017-11-29 PROCEDURE — 96375 TX/PRO/DX INJ NEW DRUG ADDON: CPT

## 2017-11-29 PROCEDURE — 85025 COMPLETE CBC W/AUTO DIFF WBC: CPT | Performed by: EMERGENCY MEDICINE

## 2017-11-29 PROCEDURE — 80053 COMPREHEN METABOLIC PANEL: CPT | Performed by: EMERGENCY MEDICINE

## 2017-11-29 PROCEDURE — 84484 ASSAY OF TROPONIN QUANT: CPT | Performed by: EMERGENCY MEDICINE

## 2017-11-29 PROCEDURE — 96361 HYDRATE IV INFUSION ADD-ON: CPT

## 2017-11-29 PROCEDURE — 99284 EMERGENCY DEPT VISIT MOD MDM: CPT

## 2017-11-29 PROCEDURE — 83690 ASSAY OF LIPASE: CPT | Performed by: EMERGENCY MEDICINE

## 2017-11-29 RX ORDER — ONDANSETRON 4 MG/1
4 TABLET, ORALLY DISINTEGRATING ORAL EVERY 8 HOURS PRN
Qty: 20 TABLET | Refills: 0 | Status: SHIPPED | OUTPATIENT
Start: 2017-11-29 | End: 2018-05-01 | Stop reason: SDUPTHER

## 2017-11-29 RX ORDER — OXYCODONE HYDROCHLORIDE AND ACETAMINOPHEN 5; 325 MG/1; MG/1
1-2 TABLET ORAL EVERY 6 HOURS PRN
Qty: 12 TABLET | Refills: 0 | Status: SHIPPED | OUTPATIENT
Start: 2017-11-29 | End: 2018-04-18

## 2017-11-29 RX ORDER — ONDANSETRON 2 MG/ML
4 INJECTION INTRAMUSCULAR; INTRAVENOUS ONCE
Status: COMPLETED | OUTPATIENT
Start: 2017-11-29 | End: 2017-11-29

## 2017-11-29 RX ORDER — MORPHINE SULFATE 10 MG/ML
6 INJECTION, SOLUTION INTRAMUSCULAR; INTRAVENOUS ONCE
Status: COMPLETED | OUTPATIENT
Start: 2017-11-29 | End: 2017-11-29

## 2017-11-29 RX ADMIN — SODIUM CHLORIDE 1000 ML: 0.9 INJECTION, SOLUTION INTRAVENOUS at 19:54

## 2017-11-29 RX ADMIN — MORPHINE SULFATE 6 MG: 10 INJECTION, SOLUTION INTRAMUSCULAR; INTRAVENOUS at 19:54

## 2017-11-29 RX ADMIN — ONDANSETRON 4 MG: 2 INJECTION INTRAMUSCULAR; INTRAVENOUS at 19:54

## 2017-11-29 NOTE — PROGRESS NOTES
Assessment    1  Abnormal LFTs (790 6) (R79 89)   2  Chronic pain (338 29) (G89 29)   3  Chronic pancreatitis, unspecified pancreatitis type (577 1) (K86 1)    Discussion/Summary  Discussion Summary:   Is a 40-year-old male with chronic pancreatitis secondary to high triglycerides and gallbladder sludge status post gallbladder removal was doing very well until recently with celiac nerve plexus blocks  He was hospitalized for severe reflux chest pain he had endoscopy with Dr Fiorella Taveras   he will continue on the Protonix 40 mg by mouth twice a day for 8 weeks  I told him to go up on the Carafate to 4 times a day and I told him to use Pepcid or Zantac as needed (symptoms are so bad this week  he will continue on the Creon which is weight based dose  he will continue with the celiac plexus nerve blocks with pain management  I told her to quit smoking  He is not having alcohol  visit was over 25 minutes over half of which is been counseling the patient regarding her antacid  Counseling Documentation With Imm: The patient was counseled regarding diagnostic results,-- prognosis,-- risks and benefits of treatment options  History of Present Illness  HPI: He is a 40-year-old male with chronic acute pancreatitis secondary to high triglycerides and gallbladder sludge  His gallbladder is out  The patient was doing very well with celiac plexus nerve blocks for chronic pancreatitis  Until recently  He was hospitalized for severe epigastric abdominal pain he had an endoscopy that showed LA grade C reflux esophagitis  In addition the patient has had dysphagia  He's gone up on his Protonix to twice a day only for about a week now  He has no nausea no burning no heartburn or melena no hematochezia  He is making cigarettes is not drinking alcohol  He's been taking Carafate over the last week but only twice a day        Review of Systems  Complete-Male GI Adult:  Constitutional: No fever or chills, feels well, no tiredness, no recent weight gain or weight loss  Eyes: No complaints of eye pain, no red eyes, no discharge from eyes, no itchy eyes  ENT: no complaints of earache, no hearing loss, no nosebleeds, no nasal discharge, no sore throat, no hoarseness  Cardiovascular: No complaints of slow heart rate, no fast heart rate, no chest pain, no palpitations, no leg claudication, no lower extremity  Respiratory: No complaints of shortness of breath, no wheezing, no cough, no SOB on exertion, no orthopnea or PND  Gastrointestinal: as noted in HPI  Genitourinary: No complaints of dysuria, no incontinence, no hesitancy, no nocturia, no genital lesion, no testicular pain  Musculoskeletal: No complaints of arthralgia, no myalgias, no joint swelling or stiffness, no limb pain or swelling  Integumentary: No complaints of skin rash or skin lesions, no itching, no skin wound, no dry skin  Neurological: No compliants of headache, no confusion, no convulsions, no numbness or tingling, no dizziness or fainting, no limb weakness, no difficulty walking  Psychiatric: Is not suicidal, no sleep disturbances, no anxiety or depression, no change in personality, no emotional problems  Endocrine: No complaints of proptosis, no hot flashes, no muscle weakness, no erectile dysfunction, no deepening of the voice, no feelings of weakness  Hematologic/Lymphatic: No complaints of swollen glands, no swollen glands in the neck, does not bleed easily, no easy bruising  ROS Reviewed:   ROS reviewed  Active Problems    1  Abnormal LFTs (790 6) (R79 89)   2  Asthma due to seasonal allergies (493 90) (J45 909)   3  Chronic pain (338 29) (G89 29)   4  Chronic pain syndrome (338 4) (G89 4)   5  Chronic pancreatitis, unspecified pancreatitis type (577 1) (K86 1)   6  Gallbladder sludge (575 8) (K82 8)   7  Gastritis without bleeding, unspecified chronicity, unspecified gastritis type (535 50) (K29 70)   8  High triglycerides (272 1) (E78 1)   9   History of allergy (V15 09) (Z88 9)   10  Hyperlipemia, mixed (272 2) (E78 2)   11  Insomnia, persistent (307 42) (G47 00)   12  Lesion of liver (573 8) (K76 9)   13  Liver abscess (572 0) (K75 0)   14  Muscular aches (729 1) (M79 1)   15  Nausea and vomiting (787 01) (R11 2)   16  Need for influenza vaccination (V04 81) (Z23)   17  Pancreatic pseudocyst/cyst (577 2) (K86 2,K86 3)   18  Postoperative state (V45 89) (Z98 890)   19  Renal mass (593 9) (N28 89)   20  Urinary retention (788 20) (R33 9)    Past Medical History  1  History of esophageal reflux (V12 79) (Z87 19)   2  History of hyperlipidemia (V12 29) (Z86 39)   3  History of pancreatitis (V12 79) (Z87 19)   4  History of Tear, meniscus (836 2) (Z06 081N)  Active Problems And Past Medical History Reviewed: The active problems and past medical history were reviewed and updated today  Surgical History    1  History of Cholecystectomy Laparoscopic   2  History of Knee Arthroscopy (Therapeutic)   3  History of Shoulder Arthroscopy With Rotator Cuff Repair  Surgical History Reviewed: The surgical history was reviewed and updated today  Family History  Mother    1  Denied: Family history of mental disorder   2  Denied: Family history of Illicit drug use  Grandmother    3  Family history of malignant neoplasm (V16 9) (Z80 9)  Grandfather    4  Family history of cardiac disorder (V17 49) (Z82 49)  Family History Reviewed: The family history was reviewed and updated today  Social History     · History of Consumes alcohol occasionally (V49 89) (Z78 9)   · Current every day smoker (305 1) (F17 200)   · Daily caffeine consumption   · Full-time employment   · History of No alcohol use  Social History Reviewed: The social history was reviewed and updated today  The social history was reviewed and is unchanged  Current Meds   1  Aspirin 325 MG Oral Tablet; TAKE 1 TABLET DAILY; Therapy: (Recorded:10Klf0785) to Recorded   2   Creon 23280 UNIT Oral Capsule Delayed Release Particles; Take 1 tablet with each snack between meals; Therapy: 73Rfv5618 to (Last Rx:26Mim9846)  Requested for: 11Xlu4293; Status: ACTIVE - Transmit to Pharmacy - Awaiting Verification Ordered   3  Creon 96383 UNIT Oral Capsule Delayed Release Particles; TAKE 1 CAPSULE 3 times daily; Therapy: 57Duw0432 to (Evaluate:62Mro2223)  Requested for: 24Jan2017; Last Rx:24Jan2017; Status: ACTIVE - Transmit to Pharmacy - Awaiting Verification Ordered   4  EpiPen 2-Larry 0 3 MG/0 3ML Injection Solution Auto-injector; INJECT 0 3ML INTRAMUSCULARLY AS DIRECTED  Requested for: 54Mtf7592; Last Rx:53Wlf2862 Ordered   5  Fenofibric Acid 135 MG Oral Capsule Delayed Release; TAKE ONE CAPSULE BY MOUTH EVERY DAY; Therapy: 76AAJ0615 to (Evaluate:07Ohj7932)  Requested for: 29Tjp9604; Last Rx:85Lqe9316 Ordered   6  Flaxseed (Linseed) 1000 MG CAPS; TAKE 3 TABLET BY MOUTH DAILY; Therapy: (Teetee Score) to Recorded   7  Niacin ER (Antihyperlipidemic) 500 MG Oral Tablet Extended Release; TAKE ONE TABLET BY MOUTH NIGHTLY AT BEDTIME; Therapy: 76SVF5476 to (Evaluate:93Phu8579)  Requested for: 49NBT7990; Last Rx:60Ble5472 Ordered   8  Omega-3-acid Ethyl Esters 1 GM Oral Capsule; TAKE 2 CAPSULES BY MOUTH TWICE DAILY; Therapy: 35Gmu0578 to (Evaluate:92Ebk0910)  Requested for: 93Twd8209; Last Rx:30Pcd0889 Ordered   9  Ondansetron HCl - 4 MG Oral Tablet; TAKE 1 TABLET Every 8 hours PRN nausea and vomiting; Therapy: 70Kio3973 to (Evaluate:25Eev9316)  Requested for: 76Wea3955; Last Rx:15Ipm9000; Status: ACTIVE - Transmit to [facility] Verification Ordered   10  Pantoprazole Sodium 40 MG Oral Tablet Delayed Release; Take 1 tablet twice daily; Therapy: 09HOQ9228 to (Last Rx:09Uon9908)  Requested for: 68BWA3611; Status:  ACTIVE - Transmit to [facility] Verification Ordered   11  Rosuvastatin Calcium 20 MG Oral Tablet; take one tablet by mouth every day;   Therapy: 89YHI6124 to (Felicitas Kirk)  Requested for: 55POU8269; Last  Rx:18Oct2017; Status: ACTIVE - Transmit to Pharmacy - Awaiting Verification Ordered   12  TraZODone HCl - 50 MG Oral Tablet; TAKE ONE TABLET BY MOUTH NIGHTLY AT  BEDTIME AS NEEDED FOR SLEEP; Therapy: 70CJI9719 to (Evaluate:10Lrr8318)  Requested for: 94LBH0269; Last  Rx:09Mar2017 Ordered   13  Ventolin  (90 Base) MCG/ACT Inhalation Aerosol Solution; INHALE 1 TO 2 PUFFS  EVERY 4 TO 6 HOURS AS NEEDED; Therapy: 19HWK5970 to (Last Rx:85Fhs9682)  Requested for: 65Vmn0867 Ordered  Medication List Reviewed: The medication list was reviewed and updated today  Allergies  1  No Known Drug Allergies  2  Bee sting    Vitals  Vital Signs    Recorded: 07VQV6976 08:41AM   Heart Rate 78   Systolic 711   Diastolic 90   Height 5 ft 11 in   Weight 199 lb 2 08 oz   BMI Calculated 27 77   BSA Calculated 2 1       Physical Exam   Constitutional  General appearance: No acute distress, well appearing and well nourished  Eyes  Conjunctiva and lids: No swelling, erythema, or discharge  Pupils and irises: Equal, round and reactive to light  Ears, Nose, Mouth, and Throat  External inspection of ears and nose: Normal    Otoscopic examination: Tympanic membrance translucent with normal light reflex  Canals patent without erythema  Nasal mucosa, septum, and turbinates: Normal without edema or erythema  Oropharynx: Normal with no erythema, edema, exudate or lesions  Pulmonary  Respiratory effort: No increased work of breathing or signs of respiratory distress  Auscultation of lungs: Clear to auscultation, equal breath sounds bilaterally, no wheezes, no rales, no rhonci  Cardiovascular  Palpation of heart: Normal PMI, no thrills  Auscultation of heart: Normal rate and rhythm, normal S1 and S2, without murmurs  Examination of extremities for edema and/or varicosities: Normal    Carotid pulses: Normal    Abdomen  Abdomen: Non-tender, no masses  Liver and spleen: No hepatomegaly or splenomegaly  Lymphatic  Palpation of lymph nodes in neck: No lymphadenopathy  Musculoskeletal  Gait and station: Normal    Digits and nails: Normal without clubbing or cyanosis  Inspection/palpation of joints, bones, and muscles: Normal    Skin  Skin and subcutaneous tissue: Normal without rashes or lesions  Neurologic  Cranial nerves: Cranial nerves 2-12 intact  Reflexes: 2+ and symmetric  Sensation: No sensory loss           Future Appointments    Date/Time Provider Specialty Site   11/30/2017 08:00 AM Estephania Aguilera, [facility]   01/15/2018 08:30 AM Estephania Aguilera, [facility]       Signatures   Electronically signed by : Otf Rader MD; Nov 28 2017  8:52AM EST                       (Author)

## 2017-11-30 ENCOUNTER — ALLSCRIPTS OFFICE VISIT (OUTPATIENT)
Dept: OTHER | Facility: OTHER | Age: 42
End: 2017-11-30

## 2017-11-30 ENCOUNTER — GENERIC CONVERSION - ENCOUNTER (OUTPATIENT)
Dept: OTHER | Facility: OTHER | Age: 42
End: 2017-11-30

## 2017-11-30 LAB
ATRIAL RATE: 82 BPM
P AXIS: 47 DEGREES
PR INTERVAL: 136 MS
QRS AXIS: 61 DEGREES
QRSD INTERVAL: 112 MS
QT INTERVAL: 370 MS
QTC INTERVAL: 432 MS
T WAVE AXIS: 64 DEGREES
VENTRICULAR RATE: 82 BPM

## 2017-11-30 NOTE — DISCHARGE INSTRUCTIONS
Abdominal Pain   WHAT YOU NEED TO KNOW:   Abdominal pain can be dull, achy, or sharp  You may have pain in one area of your abdomen, or in your entire abdomen  Your pain may be caused by a condition such as constipation, food sensitivity or poisoning, infection, or a blockage  Abdominal pain can also be from a hernia, appendicitis, or an ulcer  Liver, gallbladder, or kidney conditions can also cause abdominal pain  The cause of your abdominal pain may be unknown  DISCHARGE INSTRUCTIONS:   Return to the emergency department if:   · You have new chest pain or shortness of breath  · You have pulsing pain in your upper abdomen or lower back that suddenly becomes constant  · Your pain is in the right lower abdominal area and worsens with movement  · You have a fever over 100 4°F (38°C) or shaking chills  · You are vomiting and cannot keep food or liquids down  · Your pain does not improve or gets worse over the next 8 to 12 hours  · You see blood in your vomit or bowel movements, or they look black and tarry  · Your skin or the whites of your eyes turn yellow  · You are a woman and have a large amount of vaginal bleeding that is not your monthly period  Contact your healthcare provider if:   · You have pain in your lower back  · You are a man and have pain in your testicles  · You have pain when you urinate  · You have questions or concerns about your condition or care  Follow up with your healthcare provider within 24 hours or as directed:  Write down your questions so you remember to ask them during your visits  Medicines:   · Medicines  may be given to calm your stomach and prevent vomiting or to decrease pain  Ask how to take pain medicine safely  · Take your medicine as directed  Contact your healthcare provider if you think your medicine is not helping or if you have side effects  Tell him of her if you are allergic to any medicine   Keep a list of the medicines, vitamins, and herbs you take  Include the amounts, and when and why you take them  Bring the list or the pill bottles to follow-up visits  Carry your medicine list with you in case of an emergency  © 2017 [employer] Information is for End User's use only and may not be sold, redistributed or otherwise used for commercial purposes  All illustrations and images included in CareNotes® are the copyrighted property of [employer]  or [employer]  The above information is an  only  It is not intended as medical advice for individual conditions or treatments  Talk to your doctor, nurse or pharmacist before following any medical regimen to see if it is safe and effective for you

## 2017-11-30 NOTE — ED PROVIDER NOTES
History  Chief Complaint   Patient presents with    Abdominal Pain     Pt reports abdominal pain located b/l UQ starting Sunday night  Pt reports hx of gastritis - which he believes is worsened  Patient is a 40-year-old male  He has chronic abdominal pain  He has a history of a cholecystectomy  He has chronic pancreatitis related to high triglycerides and gallbladder sludge post cholecystectomy  He was recently admitted for severe gastritis  He underwent EGD  Biopsy showed nonspecific mild inflammatory changes  There was no malignancy  There is no H pylori eye  No eosinophilic esophagitis  He did have a CT scan at that time  It showed findings consistent with gastritis  It was otherwise nonspecific  He was admitted shortly before Thanksgiving  He returns to the emergency room today complaining of epigastric pain  It is unrelieved with antacids  He did follow up with GI who recommended addition of an H2 blocker to his PPI  He does take pancreatic enzymes  He also is on sucralfate  None of this has helped  He vomited  There was no blood  There was no bile  No constipation or diarrhea  No fever or chills  No urinary complaints  Currently pain is moderately severe  Does not radiate  It has been worse since Monday  Prior to Admission Medications   Prescriptions Last Dose Informant Patient Reported? Taking? Chlorzoxazone (LORZONE) 750 MG TABS   Yes No   Sig: Take 750 mg by mouth 2 (two) times a day   EPINEPHrine (EPIPEN 2-ARIEL) 0 3 mg/0 3 mL SOAJ   Yes No   Sig: EpiPen 2-Ariel 0 3 MG/0 3ML Injection Solution Auto-injector  INJECT 0 3ML INTRAMUSCULARLY AS DIRECTED     Quantity: 1;  Refills: 1      Toro Briceño ; Active  2 Solution Auto-injector Pen   FLAXSEED, LINSEED, PO  Self Yes No   Sig: Take 1,000 mg by mouth 3 (three) times a day   Pancrelipase, Lip-Prot-Amyl, (CREON) 48377 units CPEP   Yes No   Sig: Take by mouth   albuterol (PROVENTIL HFA,VENTOLIN HFA) 90 mcg/act inhaler   Yes No   Sig: Inhale 1-2 puffs every 4 (four) hours as needed for wheezing     aspirin 325 mg tablet   Yes No   Sig: Take 325 mg by mouth daily     choline fenofibrate (TRILIPIX) 135 MG capsule   Yes No   Sig: Take 135 mg by mouth daily     niacin (NIASPAN) 500 mg CR tablet   No No   Sig: Take 1 tablet by mouth daily with breakfast   omega-3-acid ethyl esters (LOVAZA) 1 g capsule   Yes No   Sig: Take 2 g by mouth 2 (two) times a day     ondansetron (ZOFRAN) 4 mg tablet   Yes No   Sig: Take by mouth   oxyCODONE (ROXICODONE) 5 mg immediate release tablet   No No   Sig: Take 1 tablet by mouth every 4 (four) hours as needed for moderate pain for up to 10 days Max Daily Amount: 30 mg   pancrelipase, Lip-Prot-Amyl, (CREON) 12,000 units capsule   Yes No   Sig: Take by mouth   pancrelipase, Lip-Prot-Amyl, (CREON) 24,000 units   No No   Sig: Take 1 capsule by mouth 3 (three) times a day with meals for 30 days   Patient taking differently: Take 12,000 Units by mouth daily     pantoprazole (PROTONIX) 40 mg   Yes No   Sig: Take by mouth   pantoprazole (PROTONIX) 40 mg tablet   No No   Sig: Take 1 tablet by mouth 2 (two) times a day   rosuvastatin (CRESTOR) 20 MG tablet   Yes No   Sig: Take 20 mg by mouth daily     sucralfate (CARAFATE) 1 g/10 mL suspension   No No   Sig: Take 10 mL by mouth 2 (two) times a day before meals   traZODone (DESYREL) 50 mg tablet  Self Yes No   Sig: Take 50 mg by mouth daily at bedtime      Facility-Administered Medications: None       Past Medical History:   Diagnosis Date    Asthma     GERD (gastroesophageal reflux disease)     Hyperlipidemia     Liver abscess     Meniscus tear     Pancreatitis        Past Surgical History:   Procedure Laterality Date    CHOLECYSTECTOMY      ESOPHAGOGASTRODUODENOSCOPY N/A 11/16/2017    Procedure: ESOPHAGOGASTRODUODENOSCOPY (EGD); Surgeon: Mavis Castorena MD;  Location: [facility];   Service: Gastroenterology    KNEE ARTHROSCOPY Bilateral     LIVER SURGERY      PANCREAS SURGERY      stents    AK INJECT NERV BLCK,CELIAC PLEXUS Bilateral 5/9/2017    Procedure: CELIAC PLEXUS BLOCK ;  Surgeon: Sharyn Felder MD;  [facility];  Service: Pain Management     AK INJECT NERV BLCK,CELIAC PLEXUS Bilateral 6/1/2017    Procedure: SPLANCHNIC NERVE BLOCK at T12;  Surgeon: Sharyn Felder MD;  [facility];  Service: Pain Management     AK INJECT NERV BLCK,CELIAC PLEXUS Bilateral 8/8/2017    Procedure: BILATERAL SPLANCHNIC NERVE BLOCK T12;  Surgeon: Sharyn Felder MD;  [facility];  Service: Pain Management     AK LAP,CHOLECYSTECTOMY N/A 2/14/2017    Procedure: LAPAROSCOPIC CHOLECYSTECTOMY, IOC, POSSIBLE OPEN ;  Surgeon: Hossein Rice MD;  [facility];  Service: General    ROTATOR CUFF REPAIR Right     SHOULDER ARTHROSCOPY      SHOULDER ARTHROSCOPY Right        Family History   Problem Relation Age of Onset    Cirrhosis Mother      I have reviewed and agree with the history as documented  Social History   Substance Use Topics    Smoking status: Current Every Day Smoker     Packs/day: 0 50     Years: 20 00    Smokeless tobacco: Never Used    Alcohol use No        Review of Systems   Constitutional: Negative for chills and fever  HENT: Negative for rhinorrhea and sore throat  Eyes: Negative for pain, redness and visual disturbance  Respiratory: Negative for cough and shortness of breath  Cardiovascular: Negative for chest pain and leg swelling  Gastrointestinal: Positive for abdominal pain, nausea and vomiting  Negative for diarrhea  Endocrine: Negative for polydipsia and polyuria  Genitourinary: Negative for dysuria, frequency and hematuria  Musculoskeletal: Negative for back pain and neck pain  Skin: Negative for rash and wound  Allergic/Immunologic: Negative for immunocompromised state  Neurological: Negative for weakness, numbness and headaches     Psychiatric/Behavioral: Negative for hallucinations and suicidal ideas  All other systems reviewed and are negative  Physical Exam  ED Triage Vitals [11/29/17 1856]   Temperature Pulse Respirations Blood Pressure SpO2   99 1 °F (37 3 °C) 99 21 126/90 99 %      Temp Source Heart Rate Source Patient Position - Orthostatic VS BP Location FiO2 (%)   Oral Monitor Sitting Left arm --      Pain Score       9           Orthostatic Vital Signs  Vitals:    11/29/17 1856   BP: 126/90   Pulse: 99   Patient Position - Orthostatic VS: Sitting       Physical Exam   Constitutional: He is oriented to person, place, and time  He appears well-developed and well-nourished  He appears distressed  HENT:   Head: Normocephalic and atraumatic  Mouth/Throat: Oropharynx is clear and moist    Eyes: Right eye exhibits no discharge  Left eye exhibits no discharge  No scleral icterus  Neck: Normal range of motion  Neck supple  Cardiovascular: Normal rate, regular rhythm, normal heart sounds and intact distal pulses  Exam reveals no gallop and no friction rub  No murmur heard  Pulmonary/Chest: Effort normal and breath sounds normal  No respiratory distress  He has no wheezes  He has no rales  Abdominal: Soft  Bowel sounds are normal  He exhibits no distension and no mass  There is tenderness  There is no rebound and no guarding  No hernia  There is a moderate amount of tenderness to the epigastrium and right upper quadrant  There is no pulsatile mass  There are no peritoneal signs  Musculoskeletal: Normal range of motion  He exhibits no edema, tenderness or deformity  No CVA tenderness  No calf tenderness  Neurological: He is alert and oriented to person, place, and time  He has normal strength  No sensory deficit  GCS eye subscore is 4  GCS verbal subscore is 5  GCS motor subscore is 6  Skin: Skin is warm and dry  No rash noted  He is not diaphoretic  Psychiatric: He has a normal mood and affect  His behavior is normal    Vitals reviewed        ED Medications  Medications   sodium chloride 0 9 % bolus 1,000 mL (1,000 mL Intravenous New Bag 11/29/17 1954)   ondansetron (ZOFRAN) injection 4 mg (4 mg Intravenous Given 11/29/17 1954)   morphine (PF) 10 mg/mL injection 6 mg (6 mg Intravenous Given 11/29/17 1954)       Diagnostic Studies  Results Reviewed     Procedure Component Value Units Date/Time    Troponin I [[id number]]  (Normal) Collected:  11/29/17 1952    Lab Status:  Final result Specimen:  Blood from Arm, Right Updated:  11/29/17 2021     Troponin I <0 02 ng/mL     Narrative:         [employer] Chemistry analyzer 99% cutoff is > 0 04 ng/mL in network labs    o cTnI 99% cutoff is useful only when applied to patients in the clinical setting of myocardial ischemia  o cTnI 99% cutoff should be interpreted in the context of clinical history, ECG findings and possibly cardiac imaging to establish correct diagnosis  o cTnI 99% cutoff may be suggestive but clearly not indicative of a coronary event without the clinical setting of myocardial ischemia      Lipase [[id number]]  (Normal) Collected:  11/29/17 1952    Lab Status:  Final result Specimen:  Blood from Arm, Right Updated:  11/29/17 2016     Lipase 264 u/L     Comprehensive metabolic panel [[id number]] Collected:  11/29/17 1952    Lab Status:  Final result Specimen:  Blood from Arm, Right Updated:  11/29/17 2016     Sodium 138 mmol/L      Potassium 3 7 mmol/L      Chloride 100 mmol/L      CO2 29 mmol/L      Anion Gap 9 mmol/L      BUN 9 mg/dL      Creatinine 0 83 mg/dL      Glucose 105 mg/dL      Calcium 9 4 mg/dL      AST 16 U/L      ALT 24 U/L      Alkaline Phosphatase 61 U/L      Total Protein 7 5 g/dL      Albumin 3 9 g/dL      Total Bilirubin 0 60 mg/dL      eGFR 107 ml/min/1 73sq m     Narrative:         National Kidney Disease Education Program recommendations are as follows:  GFR calculation is accurate only with a steady state creatinine  Chronic Kidney disease less than 60 ml/min/1 73 sq  meters  Kidney failure less than 15 ml/min/1 73 sq  meters  CBC and differential [[id number]]  (Abnormal) Collected:  11/29/17 1952    Lab Status:  Final result Specimen:  Blood from Arm, Right Updated:  11/29/17 1959     WBC 13 51 (H) Thousand/uL      RBC 4 68 Million/uL      Hemoglobin 14 5 g/dL      Hematocrit 42 7 %      MCV 91 fL      MCH 31 0 pg      MCHC 34 0 g/dL      RDW 12 2 %      MPV 9 7 fL      Platelets 400 Thousands/uL      nRBC 0 /100 WBCs      Neutrophils Relative 71 %      Lymphocytes Relative 16 %      Monocytes Relative 7 %      Eosinophils Relative 4 %      Basophils Relative 1 %      Neutrophils Absolute 9 65 (H) Thousands/µL      Lymphocytes Absolute 2 21 Thousands/µL      Monocytes Absolute 0 94 Thousand/µL      Eosinophils Absolute 0 57 Thousand/µL      Basophils Absolute 0 08 Thousands/µL                  No orders to display              Procedures  ECG 12 Lead Documentation  Date/Time: 11/29/2017 7:48 PM  Performed by: Freda Eisenmenger by: Kiley Ortiz     ECG reviewed by me, the ED Provider: yes    Patient location:  ED  Comments:      Normal sinus rhythm with sinus arrhythmia  Incomplete right bundle branch block  Unchanged from old EKG  No ischemic ST wave changes  Phone Contacts  ED Phone Contact    ED Course  ED Course                                MDM  Number of Diagnoses or Management Options  Diagnosis management comments: Labs are only significant for mildly elevated white count  Patient feels better after ED treatment  He does not have an acute surgical abdomen  Does have a history of chronic abdominal pain  At this point patient is appropriate for discharge and follow up with his gastroenterologist for definitive management         Amount and/or Complexity of Data Reviewed  Clinical lab tests: ordered and reviewed  Review and summarize past medical records: yes  Independent visualization of images, tracings, or specimens: yes      Richa Time    Disposition  Final diagnoses:   Epigastric pain     Time reflects when diagnosis was documented in both MDM as applicable and the Disposition within this note     Time User Action Codes Description Comment    11/29/2017  8:27 PM Sary Lenz Add [R10 13] Epigastric pain       ED Disposition     ED Disposition Condition Comment    Discharge  Anastasiia East discharge to home/self care  Condition at discharge: Good        Follow-up Information     Follow up With Specialties Details Why Contact Info    Shlomo Espinal MD Gastroenterology In 2 days  [address] [place] [address]/[facility] [address]  [phone]          Patient's Medications   Discharge Prescriptions    ONDANSETRON (ZOFRAN-ODT) 4 MG DISINTEGRATING TABLET    Take 1 tablet by mouth every 8 (eight) hours as needed for nausea or vomiting       Start Date: 11/29/2017End Date: --       Order Dose: 4 mg       Quantity: 20 tablet    Refills: 0    OXYCODONE-ACETAMINOPHEN (PERCOCET) 5-325 MG PER TABLET    Take 1-2 tablets by mouth every 6 (six) hours as needed for severe pain Max Daily Amount: 8 tablets       Start Date: 11/29/2017End Date: --       Order Dose: 1-2 tablets       Quantity: 12 tablet    Refills: 0     No discharge procedures on file      ED Provider  Electronically Signed by           Sudie Riedel, MD  11/29/17 2028

## 2017-12-01 ENCOUNTER — ALLSCRIPTS OFFICE VISIT (OUTPATIENT)
Dept: OTHER | Facility: OTHER | Age: 42
End: 2017-12-01

## 2017-12-05 NOTE — PROGRESS NOTES
Assessment    1  Chronic pain syndrome (338 4) (G89 4)   2  Chronic pancreatitis, unspecified pancreatitis type (577 1) (K86 1)    Plan  Chronic pain syndrome, Chronic pancreatitis, unspecified pancreatitis type    · TraMADol HCl - 50 MG Oral Tablet; TAKE 1 TABLET Twice daily for pain   Rx By: Atiya Oden; Dispense: 15 Days ; #:30 Tablet; Refill: 0; For: Chronic pain syndrome, Chronic pancreatitis, unspecified pancreatitis type; LAVONNE = N; Print Rx    Complete risks and benefits of the procedure were reviewed including bleeding, infection, tissue reaction, allergic reaction and nerve injury with verbal and written consent being obtained  Discussion/Summary    Mr Efren Torres is a 12-year-old male who presents today with a history of chronic pancreatitis secondary to pseudocyst formation  Patient has chronic abdominal pain  We have done a series of Splanchnic nerve blocks and he has done really well  Abdominal pain has gradually returned and he is interested in a repeat injection at this time  I will go ahead and schedule him for a repeat bilateral L2-Splanchnic nerve block  Risk includes bleeding, infection, nerve injury, hypotension and paralysis  He verbalizes understanding  We will contact the hospital to schedule the case and once a time slot has been approved, we will contact patient accordingly  He is encouraged to continue Lorzone 750mg po BID  A refill will be sent to his pharmacy  Today he was also given a short term supply for tramadol 50mg po bid x 15 day supply to provide relief until he gets the procedure done  Long term opiates is not necessary in this case  The patient has the current Goals: Continue to manage pain and improve ADLs  The patent has the current Barriers: None  Patient is able to Self-Care  Educational resources provided: Re: splanchnic nerve block  Possible side effects of new medications were reviewed with the patient/guardian today   The treatment plan was reviewed with the patient/guardian  The patient/guardian understands and agrees with the treatment plan   The patient was counseled regarding instructions for management, risk factor reductions, prognosis, patient and family education, impressions, risks and benefits of treatment options and importance of compliance with treatment  Chief Complaint    1  Pain    History of Present Illness  The patient is a 42-year-old male who presents today with chronic abdominal pain with h/o chronic pancreatitis liver abscess and multiple abdominal surgeries  We performed splanchnic nerve block back in August 2017  He reports about 4 months of pain relief greater than 80 percent  Patient states that his pain has returned and that he would like to undergo a repeat injection at this time  He takes Lorzone 750mg po Q12HRS which controls some of his pain  He states that he was recently in the ER for severe abdominal pain and it was recommended that he undergoes the procedure again for pain relief  He was given a 4 day supply of oxycodone upon discharge from the ER  Pain score is 8/10  Referring physician is  Stella Espino   Primary Care physician is  Dr Karolina Joy presents with complaints of gradual onset of constant episodes of severe bilateral abdominal pain, described as sharp, burning and cramping  On a scale of 1 to 10, the patient rates the pain as 8  Symptoms are worsening  Review of Systems    Constitutional: no fever, no recent weight gain and no recent weight loss  Eyes: no double vision and no blurry vision  Cardiovascular: no chest pain, no palpitations and no lower extremity edema  Respiratory: no complaints of shortness of breath and no wheezing  Musculoskeletal: no difficulty walking, no muscle weakness, no joint stiffness, no joint swelling, no limb swelling, no pain in extremity and no decreased range of motion     Neurological: no dizziness, no difficulty swallowing, no memory loss, no loss of consciousness and no seizures  Gastrointestinal: nausea and vomiting, but no constipation and no diarrhea  Genitourinary: no difficulty initiating urine stream, no genital pain and no frequent urination  Integumentary: no complaints of skin rash  Psychiatric: no depression  Endocrine: no excessive thirst, no adrenal disease, no hypothyroidism and no hyperthyroidism  Hematologic/Lymphatic: no tendency for easy bruising and no tendency for easy bleeding  ROS reviewed  Active Problems    1  Abnormal LFTs (790 6) (R79 89)   2  Asthma due to seasonal allergies (493 90) (J45 909)   3  Chronic pain (338 29) (G89 29)   4  Chronic pain syndrome (338 4) (G89 4)   5  Chronic pancreatitis, unspecified pancreatitis type (577 1) (K86 1)   6  Gallbladder sludge (575 8) (K82 8)   7  Gastritis without bleeding, unspecified chronicity, unspecified gastritis type (535 50)   (K29 70)   8  High triglycerides (272 1) (E78 1)   9  History of allergy (V15 09) (Z88 9)   10  Hyperlipemia, mixed (272 2) (E78 2)   11  Insomnia, persistent (307 42) (G47 00)   12  Lesion of liver (573 8) (K76 9)   13  Liver abscess (572 0) (K75 0)   14  Muscular aches (729 1) (M79 1)   15  Nausea and vomiting (787 01) (R11 2)   16  Need for influenza vaccination (V04 81) (Z23)   17  Pancreatic pseudocyst/cyst (577 2) (K86 2,K86 3)   18  Postoperative state (V45 89) (Z98 890)   19  Renal mass (593 9) (N28 89)   20  Urinary retention (788 20) (R33 9)    Past Medical History    1  History of esophageal reflux (V12 79) (Z87 19)   2  History of hyperlipidemia (V12 29) (Z86 39)   3  History of pancreatitis (V12 79) (Z87 19)   4  History of Tear, meniscus (836 2) (Y46 399M)    The active problems and past medical history were reviewed and updated today  Surgical History    1  History of Cholecystectomy Laparoscopic   2  History of Knee Arthroscopy (Therapeutic)   3   History of Shoulder Arthroscopy With Rotator Cuff Repair    The surgical history was reviewed and updated today  Family History  Mother    1  Denied: Family history of mental disorder   2  Denied: Family history of Illicit drug use  Grandmother    3  Family history of malignant neoplasm (V16 9) (Z80 9)  Grandfather    4  Family history of cardiac disorder (V17 49) (Z82 49)    The family history was reviewed and updated today  Social History    · History of Consumes alcohol occasionally (V49 89) (Z78 9)   · Current every day smoker (305 1) (F17 200)   · Daily caffeine consumption   · Full-time employment   · History of No alcohol use    Current Meds   1  Aspirin 325 MG Oral Tablet; TAKE 1 TABLET DAILY; Therapy: (Recorded:58Ghu0093) to Recorded   2  Creon 22737 UNIT Oral Capsule Delayed Release Particles; Take 1 tablet with each   snack between meals; Therapy: 01Atz6252 to (Last Rx:45Bie1782)  Requested for: 13Apr2017; Status: ACTIVE -   Transmit to Pharmacy - Awaiting Verification Ordered   3  Creon 76148 UNIT Oral Capsule Delayed Release Particles; TAKE 1 CAPSULE 3 times   daily; Therapy: 13Lqn6715 to (Evaluate:39Vxk7034)  Requested for: 24Jan2017; Last   Rx:24Jan2017; Status: ACTIVE - Transmit to Pharmacy - Awaiting Verification Ordered   4  EpiPen 2-Larry 0 3 MG/0 3ML Injection Solution Auto-injector; INJECT 0 3ML   INTRAMUSCULARLY AS DIRECTED  Requested for: 21Aug2017; Last Rx:21Aug2017   Ordered   5  Fenofibric Acid 135 MG Oral Capsule Delayed Release; TAKE ONE CAPSULE BY   MOUTH EVERY DAY; Therapy: 92OVZ5239 to (Evaluate:73Spn1727)  Requested for: 01Fau1052; Last   Rx:22Aug2017 Ordered   6  Flaxseed (Linseed) 1000 MG CAPS; TAKE 3 TABLET BY MOUTH DAILY; Therapy: (Jasmine Kellogg) to Recorded   7  Niacin ER (Antihyperlipidemic) 500 MG Oral Tablet Extended Release; TAKE ONE   TABLET BY MOUTH NIGHTLY AT BEDTIME; Therapy: 59CGO0762 to (Evaluate:74Duw0324)  Requested for: 65SSW3068; Last   Rx:27Nov2017 Ordered   8   Omega-3-acid Ethyl Esters 1 GM Oral Capsule; TAKE 2 CAPSULES BY MOUTH TWICE   DAILY; Therapy: 79Cmh9130 to (Evaluate:58Ecg3944)  Requested for: 09Fxa4712; Last   Rx:81Bxq2253 Ordered   9  Ondansetron HCl - 4 MG Oral Tablet; TAKE 1 TABLET Every 8 hours PRN nausea and   vomiting; Therapy: 02Ibq4220 to (Evaluate:68Tqq5779)  Requested for: 04Qvh0668; Last   Rx:36Mko9273; Status: ACTIVE - Transmit to [facility] Verification Ordered   10  Pantoprazole Sodium 40 MG Oral Tablet Delayed Release; Take 1 tablet twice daily; Therapy: 04EXW1858 to (Last Rx:09Dcg0994)  Requested for: 67TJM3559; Status:    ACTIVE - Transmit to [facility] Verification Ordered   11  Rosuvastatin Calcium 20 MG Oral Tablet; take one tablet by mouth every day; Therapy: 76KCG8619 to 61 60 34)  Requested for: 26POF0244; Last    Rx:08Nlm3395; Status: ACTIVE - Transmit to [facility] Verification Ordered   12  TraZODone HCl - 50 MG Oral Tablet; TAKE ONE TABLET BY MOUTH NIGHTLY AT    BEDTIME AS NEEDED FOR SLEEP; Therapy: 76DIQ7855 to (Evaluate:09Sxm0804)  Requested for: 54GRV2383; Last    Rx:09Mar2017 Ordered   13  Ventolin  (90 Base) MCG/ACT Inhalation Aerosol Solution; INHALE 1 TO 2 PUFFS    EVERY 4 TO 6 HOURS AS NEEDED; Therapy: 47NVE3296 to (Last Rx:81Boj1759)  Requested for: 75Wjx5187 Ordered    The medication list was reviewed and updated today  Allergies    1  No Known Drug Allergies    2  Bee sting    Vitals  Vital Signs    Recorded: 91Uzg1172 08:17AM   Temperature 98 2 F   Heart Rate 88   Systolic 347   Diastolic 94   Height 5 ft 11 in   Weight 193 lb    BMI Calculated 26 92   BSA Calculated 2 08   Pain Scale 8     Physical Exam    Constitutional   General appearance: Well developed, well nourished, alert, in no distress, non-toxic and no overt pain behavior  Eyes   Sclera: anicteric   HEENT   Hearing grossly intact  Neck   Neck: Supple, symmetric, trachea midline, no masses      Pulmonary   Respiratory effort: Even and unlabored  Cardiovascular   Examination of extremities: No edema or pitting edema present  Abdomen   Abdomen: Abnormal   The abdomen was flat  There was moderate tenderness in the periumbilical area, in the suprapubic area, in the left upper quadrant and in the left lower quadrant  The abdomen was firm  The abdomen was normal to percussion  There is no suprapubic tenderness  Subxiphoid tenderness and bilateral flank tenderness  Skin   Skin and subcutaneous tissue: Normal without rashes or lesions, well hydrated  Lymphatic   Cervical Lymph Nodes: no adenopathy   Axillary Lymph Nodes: No lymphadenopathy  Groin Lymph Nodes: No lymphadenopathy  Psychiatric   Mood and affect: Mood and affect appropriate  Neurologic   Cranial nerves: Cranial nerves II-XII grossly intact      Musculoskeletal   Gait and station: Normal        Future Appointments    Date/Time Provider Specialty Site   01/15/2018 08:30 AM Nedra Madsen, [address] [place] [address]     Signatures   Electronically signed by : Ismael Brown MD; Dec  1 2017  8:50AM EST                       (Author)

## 2017-12-14 ENCOUNTER — GENERIC CONVERSION - ENCOUNTER (OUTPATIENT)
Dept: OTHER | Facility: OTHER | Age: 42
End: 2017-12-14

## 2017-12-26 ENCOUNTER — GENERIC CONVERSION - ENCOUNTER (OUTPATIENT)
Dept: OTHER | Facility: OTHER | Age: 42
End: 2017-12-26

## 2017-12-28 ENCOUNTER — ANESTHESIA (OUTPATIENT)
Dept: PERIOP | Facility: HOSPITAL | Age: 42
End: 2017-12-28
Payer: COMMERCIAL

## 2017-12-28 ENCOUNTER — ANESTHESIA EVENT (OUTPATIENT)
Dept: PERIOP | Facility: HOSPITAL | Age: 42
End: 2017-12-28
Payer: COMMERCIAL

## 2017-12-28 ENCOUNTER — HOSPITAL ENCOUNTER (OUTPATIENT)
Facility: HOSPITAL | Age: 42
Setting detail: OUTPATIENT SURGERY
Discharge: HOME | End: 2017-12-28
Attending: ANESTHESIOLOGY | Admitting: ANESTHESIOLOGY
Payer: COMMERCIAL

## 2017-12-28 ENCOUNTER — APPOINTMENT (OUTPATIENT)
Dept: RADIOLOGY | Facility: HOSPITAL | Age: 42
End: 2017-12-28
Payer: COMMERCIAL

## 2017-12-28 VITALS
HEART RATE: 84 BPM | WEIGHT: 192 LBS | RESPIRATION RATE: 18 BRPM | SYSTOLIC BLOOD PRESSURE: 141 MMHG | TEMPERATURE: 98.3 F | DIASTOLIC BLOOD PRESSURE: 75 MMHG | HEIGHT: 71 IN | OXYGEN SATURATION: 96 % | BODY MASS INDEX: 26.88 KG/M2

## 2017-12-28 PROCEDURE — 72070 X-RAY EXAM THORAC SPINE 2VWS: CPT

## 2017-12-28 RX ORDER — ONDANSETRON 2 MG/ML
INJECTION INTRAMUSCULAR; INTRAVENOUS AS NEEDED
Status: DISCONTINUED | OUTPATIENT
Start: 2017-12-28 | End: 2017-12-28 | Stop reason: SURG

## 2017-12-28 RX ORDER — SODIUM CHLORIDE, SODIUM LACTATE, POTASSIUM CHLORIDE, CALCIUM CHLORIDE 600; 310; 30; 20 MG/100ML; MG/100ML; MG/100ML; MG/100ML
125 INJECTION, SOLUTION INTRAVENOUS CONTINUOUS
Status: DISCONTINUED | OUTPATIENT
Start: 2017-12-28 | End: 2017-12-28 | Stop reason: HOSPADM

## 2017-12-28 RX ORDER — BUPIVACAINE HYDROCHLORIDE 2.5 MG/ML
INJECTION, SOLUTION INFILTRATION; PERINEURAL AS NEEDED
Status: DISCONTINUED | OUTPATIENT
Start: 2017-12-28 | End: 2017-12-28 | Stop reason: HOSPADM

## 2017-12-28 RX ORDER — FENTANYL CITRATE/PF 50 MCG/ML
50 SYRINGE (ML) INJECTION
Status: DISCONTINUED | OUTPATIENT
Start: 2017-12-28 | End: 2017-12-28 | Stop reason: HOSPADM

## 2017-12-28 RX ORDER — MAGNESIUM HYDROXIDE 1200 MG/15ML
LIQUID ORAL AS NEEDED
Status: DISCONTINUED | OUTPATIENT
Start: 2017-12-28 | End: 2017-12-28 | Stop reason: HOSPADM

## 2017-12-28 RX ORDER — MIDAZOLAM HYDROCHLORIDE 1 MG/ML
INJECTION INTRAMUSCULAR; INTRAVENOUS AS NEEDED
Status: DISCONTINUED | OUTPATIENT
Start: 2017-12-28 | End: 2017-12-28 | Stop reason: SURG

## 2017-12-28 RX ORDER — ONDANSETRON 2 MG/ML
4 INJECTION INTRAMUSCULAR; INTRAVENOUS ONCE AS NEEDED
Status: DISCONTINUED | OUTPATIENT
Start: 2017-12-28 | End: 2017-12-28 | Stop reason: HOSPADM

## 2017-12-28 RX ORDER — LIDOCAINE HYDROCHLORIDE 20 MG/ML
INJECTION, SOLUTION INFILTRATION; PERINEURAL AS NEEDED
Status: DISCONTINUED | OUTPATIENT
Start: 2017-12-28 | End: 2017-12-28 | Stop reason: HOSPADM

## 2017-12-28 RX ORDER — FENTANYL CITRATE 50 UG/ML
INJECTION, SOLUTION INTRAMUSCULAR; INTRAVENOUS AS NEEDED
Status: DISCONTINUED | OUTPATIENT
Start: 2017-12-28 | End: 2017-12-28 | Stop reason: SURG

## 2017-12-28 RX ORDER — RANITIDINE 150 MG/1
150 TABLET ORAL 2 TIMES DAILY
COMMUNITY
End: 2018-04-18

## 2017-12-28 RX ORDER — DEXAMETHASONE SODIUM PHOSPHATE 10 MG/ML
INJECTION, SOLUTION INTRAMUSCULAR; INTRAVENOUS AS NEEDED
Status: DISCONTINUED | OUTPATIENT
Start: 2017-12-28 | End: 2017-12-28 | Stop reason: HOSPADM

## 2017-12-28 RX ADMIN — HYDROMORPHONE HYDROCHLORIDE 0.5 MG: 1 INJECTION, SOLUTION INTRAMUSCULAR; INTRAVENOUS; SUBCUTANEOUS at 14:46

## 2017-12-28 RX ADMIN — HYDROMORPHONE HYDROCHLORIDE 0.5 MG: 1 INJECTION, SOLUTION INTRAMUSCULAR; INTRAVENOUS; SUBCUTANEOUS at 14:06

## 2017-12-28 RX ADMIN — HYDROMORPHONE HYDROCHLORIDE 0.5 MG: 1 INJECTION, SOLUTION INTRAMUSCULAR; INTRAVENOUS; SUBCUTANEOUS at 14:00

## 2017-12-28 RX ADMIN — FENTANYL CITRATE 50 MCG: 50 INJECTION, SOLUTION INTRAMUSCULAR; INTRAVENOUS at 13:13

## 2017-12-28 RX ADMIN — SODIUM CHLORIDE, SODIUM LACTATE, POTASSIUM CHLORIDE, AND CALCIUM CHLORIDE: .6; .31; .03; .02 INJECTION, SOLUTION INTRAVENOUS at 13:09

## 2017-12-28 RX ADMIN — FENTANYL CITRATE 50 MCG: 50 INJECTION, SOLUTION INTRAMUSCULAR; INTRAVENOUS at 13:26

## 2017-12-28 RX ADMIN — HYDROMORPHONE HYDROCHLORIDE 0.5 MG: 1 INJECTION, SOLUTION INTRAMUSCULAR; INTRAVENOUS; SUBCUTANEOUS at 14:24

## 2017-12-28 RX ADMIN — FENTANYL CITRATE 50 MCG: 50 INJECTION INTRAMUSCULAR; INTRAVENOUS at 13:45

## 2017-12-28 RX ADMIN — HYDROMORPHONE HYDROCHLORIDE 0.5 MG: 1 INJECTION, SOLUTION INTRAMUSCULAR; INTRAVENOUS; SUBCUTANEOUS at 13:51

## 2017-12-28 RX ADMIN — HYDROMORPHONE HYDROCHLORIDE 0.5 MG: 1 INJECTION, SOLUTION INTRAMUSCULAR; INTRAVENOUS; SUBCUTANEOUS at 14:34

## 2017-12-28 RX ADMIN — ONDANSETRON 4 MG: 2 INJECTION INTRAMUSCULAR; INTRAVENOUS at 13:13

## 2017-12-28 RX ADMIN — FENTANYL CITRATE 50 MCG: 50 INJECTION, SOLUTION INTRAMUSCULAR; INTRAVENOUS at 13:34

## 2017-12-28 RX ADMIN — FENTANYL CITRATE 50 MCG: 50 INJECTION, SOLUTION INTRAMUSCULAR; INTRAVENOUS at 13:17

## 2017-12-28 RX ADMIN — FENTANYL CITRATE 50 MCG: 50 INJECTION INTRAMUSCULAR; INTRAVENOUS at 13:50

## 2017-12-28 RX ADMIN — MIDAZOLAM 2 MG: 1 INJECTION INTRAMUSCULAR; INTRAVENOUS at 13:13

## 2017-12-28 RX ADMIN — HYDROMORPHONE HYDROCHLORIDE 0.5 MG: 1 INJECTION, SOLUTION INTRAMUSCULAR; INTRAVENOUS; SUBCUTANEOUS at 14:13

## 2017-12-28 NOTE — INTERIM OP NOTE
SPLANCHNIC NERVE BLOCK  Postoperative Note  PATIENT NAME: Shima Escobar  : 1975  MRN: [id number]  [facility]    Surgery Date: 2017    Preop Diagnosis:  Chronic pain syndrome [G89 4]    Post-Op Diagnosis Codes:     * Chronic pain syndrome [G89 4]    Procedure(s) (LRB):  SPLANCHNIC NERVE BLOCK (Bilateral)    Surgeon(s) and Role:     Chacha Castro MD - Primary    Specimens:  * No specimens in log *    Estimated Blood Loss:   Minimal    Anesthesia Type:   Conscious Sedation    Findings:    None  Complications:   None    SIGNATURE: Nisreen Crowder MD   DATE: 2017   TIME: 1:44 PM

## 2017-12-28 NOTE — ANESTHESIA PREPROCEDURE EVALUATION
Review of Systems/Medical History  Patient summary reviewed  Chart reviewed  No history of anesthetic complications     Cardiovascular  EKG reviewed, Negative cardio ROS Hyperlipidemia, Dysrhythmias, ,    Pulmonary  Smoker more than 10 packs per year , Tobacco cessation counseling given, Asthma: , ,   Comment: Active smoker  Smoked this AM      GI/Hepatic    GERD , Liver disease, ,   Comment: Pancreatic cyst  Chronic Abdominal Pain     Negative  ROS        Endo/Other  Negative endo/other ROS      GYN       Hematology  Negative hematology ROS      Musculoskeletal  Negative musculoskeletal ROS        Neurology  Negative neurology ROS      Psychology   Negative psychology ROS            Physical Exam    Airway    Mallampati score: II         Dental   No notable dental hx     Cardiovascular  Comment: Negative ROS, Rhythm: regular, Rate: normal,     Pulmonary  Pulmonary exam normal     Other Findings        Anesthesia Plan  ASA Score- 3     Anesthesia Type- IV sedation with anesthesia with ASA Monitors  Additional Monitors:   Airway Plan:         Plan Factors-Patient not instructed to abstain from smoking on day of procedure  Patient smoked on day of surgery  Induction- intravenous  Postoperative Plan- Plan for postoperative opioid use  Informed Consent- Anesthetic plan and risks discussed with patient and spouse  I personally reviewed this patient with the CRNA  Discussed and agreed on the Anesthesia Plan with the CRNA  Vic Guillaume

## 2017-12-28 NOTE — ANESTHESIA POSTPROCEDURE EVALUATION
Post-Op Assessment Note      CV Status:  Stable    Mental Status:  Alert and awake    Hydration Status:  Euvolemic    PONV Controlled:  Controlled    Airway Patency:  Patent    Post Op Vitals Reviewed: Yes          Staff: CRNA, Anesthesiologist           BP   139/85   Temp      Pulse  72   Resp   18   SpO2 98

## 2017-12-28 NOTE — OP NOTE
OPERATIVE REPORT  PATIENT NAME: Jose Raul Bowens    :  1975  MRN: [id number]  Pt Location: [facility]    SURGERY DATE: 2017    Surgeon(s) and Role:     * Mateo Smis MD - Primary    Preop Diagnosis:  Chronic pain syndrome [G89 4]    Post-Op Diagnosis Codes:     * Chronic pain syndrome [G89 4]    Procedure(s) (LRB):  SPLANCHNIC NERVE BLOCK (Bilateral)    Specimen(s):  * No specimens in log *    Estimated Blood Loss:   Minimal    Drains:       Anesthesia Type:   IV sedation    Operative Indications:  Chronic pain syndrome [G89 4]  Chronic Abdominal Pain   Chronic Pancreatitis      Operative Findings:  None     Complications:   None    Procedure and Technique:    MEDICATIONS INJECTED: 12 mL of bupivacaine 0 25% (6 mL per side) plus 20mg of dexamethasone (10mg per side) in 4cc of sterile saline  LOCAL ANESTHETIC USED: 4 mL of 1% lidocaine  ESTIMATED BLOOD LOSS: None  COMPLICATIONS: None      TECHNIQUE: Time-out was taken to identify the correct patient, procedure and side prior to starting the procedure  With the patient lying prone, the area to be injected was widely prepped and draped in the usual sterile fashion using DuraPrep and a drape  The anatomical target site was determined using fluoroscopy by squaring off the superior endplate of J59, ipsilaterally obliquing the C-arm intensifier until the tip of the T12 transverse process was at the anterolateral border of the T12 vertebral body, and then moving the C-arm intensifier caudal to move the 12th rib out of the fluoroscopic target view  Local anesthetic was given by raising a skin wheal and going down to the hub of the 27-gauge 1 25-inch needle  A 22-gauge 5-inch Quincke needle was advanced at the midbody of T12 to a point in the anterior one-third of the T12 vertebral body utilizing A-P and lateral intermittent fluoroscopy   2-3 mL of Omnipaque 300 contrast was injected to show unilateral spread along the anterolateral surface of the T12 vertebral body, and no vascular uptake       The above technique was then repeated for the opposite side with contrast injected again to show unilateral spread superior and inferior along the anterolateral T12 body  Medication was then injected slowly in 3 mL increments after negative aspirations of the needle to confirm the needle had not slipped intravascular  The procedure was completed without complications and was tolerated well  The patient was monitored after the procedure  The patient and his wife were given post-procedure and discharge instructions to follow at home  The patient was discharged in stable condition  The patient will call the office to make a follow up appointment in 4 weeks  I was present for the entire procedure    Patient Disposition:  PACU immediately post-op and discharge home with a reliable escort      SIGNATURE: Thais Mckeon MD  DATE: December 28, 2017  TIME: 1:51 PM

## 2018-01-08 ENCOUNTER — APPOINTMENT (EMERGENCY)
Dept: RADIOLOGY | Facility: HOSPITAL | Age: 43
End: 2018-01-08
Payer: COMMERCIAL

## 2018-01-08 ENCOUNTER — HOSPITAL ENCOUNTER (EMERGENCY)
Facility: HOSPITAL | Age: 43
Discharge: HOME | End: 2018-01-08
Attending: EMERGENCY MEDICINE | Admitting: EMERGENCY MEDICINE
Payer: COMMERCIAL

## 2018-01-08 VITALS
SYSTOLIC BLOOD PRESSURE: 134 MMHG | BODY MASS INDEX: 26.6 KG/M2 | HEART RATE: 97 BPM | RESPIRATION RATE: 18 BRPM | DIASTOLIC BLOOD PRESSURE: 97 MMHG | OXYGEN SATURATION: 98 % | HEIGHT: 71 IN | TEMPERATURE: 97.7 F | WEIGHT: 190 LBS

## 2018-01-08 DIAGNOSIS — S90.02XA CONTUSION OF LEFT ANKLE, INITIAL ENCOUNTER: Primary | ICD-10-CM

## 2018-01-08 PROCEDURE — 99283 EMERGENCY DEPT VISIT LOW MDM: CPT

## 2018-01-08 PROCEDURE — 73600 X-RAY EXAM OF ANKLE: CPT

## 2018-01-08 RX ORDER — GINSENG 100 MG
1 CAPSULE ORAL ONCE
Status: DISCONTINUED | OUTPATIENT
Start: 2018-01-08 | End: 2018-01-08 | Stop reason: HOSPADM

## 2018-01-08 NOTE — DISCHARGE INSTRUCTIONS
Contusion in Adults   WHAT YOU NEED TO KNOW:   A contusion is a bruise that appears on your skin after an injury  A bruise happens when small blood vessels tear but skin does not  When blood vessels tear, blood leaks into nearby tissue, such as soft tissue or muscle  DISCHARGE INSTRUCTIONS:   Return to the emergency department if:   · You have new trouble moving the injured area  · You have tingling or numbness in or near the injured area  · Your hand or foot below the bruise gets cold or turns pale  Contact your healthcare provider if:   · You find a new lump in the injured area  · Your symptoms do not improve with treatment after 4 to 5 days  · You have questions or concerns about your condition or care  Medicines: You may need any of the following:  · NSAIDs  help decrease swelling and pain or fever  This medicine is available with or without a doctor's order  NSAIDs can cause stomach bleeding or kidney problems in certain people  If you take blood thinner medicine, always ask your healthcare provider if NSAIDs are safe for you  Always read the medicine label and follow directions  · Prescription pain medicine  may be given  Do not wait until the pain is severe before you take your medicine  · Take your medicine as directed  Contact your healthcare provider if you think your medicine is not helping or if you have side effects  Tell him of her if you are allergic to any medicine  Keep a list of the medicines, vitamins, and herbs you take  Include the amounts, and when and why you take them  Bring the list or the pill bottles to follow-up visits  Carry your medicine list with you in case of an emergency  Follow up with your healthcare provider as directed: You may need to return within a week to check your injury again  Write down your questions so you remember to ask them during your visits    Help a contusion heal:   · Rest the injured area  or use it less than usual  If you bruised your leg or foot, you may need crutches or a cane to help you walk  This will help you keep weight off your injured body part  · Apply ice  to decrease swelling and pain  Ice may also help prevent tissue damage  Use an ice pack, or put crushed ice in a plastic bag  Cover it with a towel and place it on your bruise for 15 to 20 minutes every hour or as directed  · Use compression  to support the area and decrease swelling  Wrap an elastic bandage around the area over the bruised muscle  Make sure the bandage is not too tight  You should be able to fit 1 finger between the bandage and your skin  · Elevate (raise) your injured body part  above the level of your heart to help decrease pain and swelling  Use pillows, blankets, or rolled towels to elevate the area as often as you can  · Do not drink alcohol  as directed  Alcohol may slow healing  · Do not stretch injured muscles  right after your injury  Ask your healthcare provider when and how you may safely stretch after your injury  Gentle stretches can help increase your flexibility  · Do not massage the area or put heating pads  on the bruise right after your injury  Heat and massage may slow healing  Your healthcare provider may tell you to apply heat after several days  At that time, heat will start to help the injury heal   Prevent another contusion:   · Stretch and warm up before you play sports or exercise  · Wear protective gear when you play sports  Examples are shin guards and padding  · If you begin a new physical activity, start slowly to give your body a chance to adjust   © 2017 [facility] Information is for End User's use only and may not be sold, redistributed or otherwise used for commercial purposes  All illustrations and images included in CareNotes® are the copyrighted property of "[employer]  or [employer]  The above information is an  only   It is not intended as medical advice for individual conditions or treatments  Talk to your doctor, nurse or pharmacist before following any medical regimen to see if it is safe and effective for you  Motrin for pain        Abrasion   WHAT YOU NEED TO KNOW:   An abrasion is a scrape on your skin  It happens when your skin rubs against a rough surface  Some examples of an abrasion include rug burn, a skinned elbow, or road rash  Abrasions can be many shapes and sizes  The wound may hurt, bleed, bruise, or swell  DISCHARGE INSTRUCTIONS:   Return to the emergency department if:   · The bleeding does not stop after 10 minutes of firm pressure  · You cannot rinse one or more foreign objects out of your wound  · You have red streaks on your skin coming from your wound  Contact your healthcare provider if:   · You have a fever or chills  · Your abrasion is red, warm, swollen, or draining pus  · You have questions or concerns about your condition or care  Care for your abrasion:   · Wash your hands and dry them with a clean towel  · Press a clean cloth against your wound to stop any bleeding  · Rinse your wound with a lot of clean water  Do not use harsh soap, alcohol, or iodine solutions  · Use a clean, wet cloth to remove any objects, such as small pieces of rocks or dirt  · Rub antibiotic ointment on your wound  This may help prevent infection and help your wound heal     · Cover the wound with a non-stick bandage  Change the bandage daily, and if gets wet or dirty  Follow up with your healthcare provider as directed:  Write down your questions so you remember to ask them during your visits  © 2017 [address]  Information is for End User's use only and may not be sold, redistributed or otherwise used for commercial purposes  All illustrations and images included in CareNotes® are the copyrighted property of A D A M , Inc  or [employer]  The above information is an  only   It is not intended as medical advice for individual conditions or treatments  Talk to your doctor, nurse or pharmacist before following any medical regimen to see if it is safe and effective for you  Ankle Sprain   WHAT YOU NEED TO KNOW:   An ankle sprain happens when 1 or more ligaments in your ankle joint stretch or tear  Ligaments are tough tissues that connect bones  Ligaments support your joints and keep your bones in place  DISCHARGE INSTRUCTIONS:   Return to the emergency department if:   · You have severe pain in your ankle  · Your foot or toes are cold or numb  · Your ankle becomes more weak or unstable (wobbly)  · You are unable to put any weight on your ankle or foot  · Your swelling has increased or returned  Contact your healthcare provider if:   · Your pain does not go away, even after treatment  · You have questions or concerns about your condition or care  Medicines: You may need any of the following:  · NSAIDs , such as ibuprofen, help decrease swelling, pain, and fever  This medicine is available with or without a doctor's order  NSAIDs can cause stomach bleeding or kidney problems in certain people  If you take blood thinner medicine, always ask your healthcare provider if NSAIDs are safe for you  Always read the medicine label and follow directions  · Acetaminophen  decreases pain  It is available without a doctor's order  Ask how much to take and how often to take it  Follow directions  Acetaminophen can cause liver damage if not taken correctly  · Prescription pain medicine  may be given  Ask how to take this medicine safely  · Take your medicine as directed  Contact your healthcare provider if you think your medicine is not helping or if you have side effects  Tell him or her if you are allergic to any medicine  Keep a list of the medicines, vitamins, and herbs you take  Include the amounts, and when and why you take them   Bring the list or the pill bottles to follow-up visits  Carry your medicine list with you in case of an emergency  Self care:   · Use support devices,  such as a brace, cast, or splint, may be needed to limit your movement and protect your joint  You may need to use crutches to decrease your pain as you move around  · Go to physical therapy as directed  A physical therapist teaches you exercises to help improve movement and strength, and to decrease pain  · Rest  your ankle so that it can heal  Return to normal activities as directed  · Apply ice on your ankle for 15 to 20 minutes every hour or as directed  Use an ice pack, or put crushed ice in a plastic bag  Cover it with a towel  Ice helps prevent tissue damage and decreases swelling and pain  · Compress  your ankle  Ask if you should wrap an elastic bandage around your injured ligament  An elastic bandage provides support and helps decrease swelling and movement so your joint can heal  Wear as long as directed  · Elevate  your ankle above the level of your heart as often as you can  This will help decrease swelling and pain  Prop your ankle on pillows or blankets to keep it elevated comfortably  Prevent another ankle sprain:   · Let your ankle heal   Find out how long your ligament needs to heal  Do not do any physical activity until your healthcare provider says it is okay  If you start activity too soon, you may develop a more serious injury  · Always warm up and stretch  before you exercise or play sports  · Use the right equipment  Always wear shoes that fit well and are made for the activity that you are doing  You may also need ankle supports, elbow and knee pads, or braces  Follow up with your healthcare provider as directed:  Write down your questions so you remember to ask them during your visits  © 2017 [address] Information is for End User's use only and may not be sold, redistributed or otherwise used for commercial purposes   All illustrations and images included in CareNotes® are the copyrighted property of A D A M [employer] Inc  or [employer]  The above information is an  only  It is not intended as medical advice for individual conditions or treatments  Talk to your doctor, nurse or pharmacist before following any medical regimen to see if it is safe and effective for you

## 2018-01-08 NOTE — ED PROVIDER NOTES
History  Chief Complaint   Patient presents with    Ankle Injury     Patient states he hit his right ankle on a dresser while pulling out a bed  Unable to bear any weight  Patient is a 49-year-old male  Last night he rolled out of bed striking his right ankle on a dresser  He is complaining of pain and difficulty weight-bearing  No associated motor sensory complaints  He denies other injuries  There is an abrasion  He is unclear of his last tetanus vaccination  Prior to Admission Medications   Prescriptions Last Dose Informant Patient Reported? Taking? Chlorzoxazone (LORZONE) 750 MG TABS   Yes No   Sig: Take 750 mg by mouth 2 (two) times a day   EPINEPHrine (EPIPEN 2-ARIEL) 0 3 mg/0 3 mL SOAJ   Yes No   Sig: EpiPen 2-Ariel 0 3 MG/0 3ML Injection Solution Auto-injector  INJECT 0 3ML INTRAMUSCULARLY AS DIRECTED     Quantity: 1;  Refills: 1      Toro Briceño ; Active  2 Solution Auto-injector Pen   FLAXSEED, LINSEED, PO  Self Yes No   Sig: Take 1,000 mg by mouth 3 (three) times a day   Pancrelipase, Lip-Prot-Amyl, (CREON) 26172 units CPEP   Yes No   Sig: Take by mouth   albuterol (PROVENTIL HFA,VENTOLIN HFA) 90 mcg/act inhaler   Yes No   Sig: Inhale 1-2 puffs every 4 (four) hours as needed for wheezing     aspirin 325 mg tablet   Yes No   Sig: Take 325 mg by mouth daily     choline fenofibrate (TRILIPIX) 135 MG capsule   Yes No   Sig: Take 135 mg by mouth daily     niacin (NIASPAN) 500 mg CR tablet   No No   Sig: Take 1 tablet by mouth daily with breakfast   omega-3-acid ethyl esters (LOVAZA) 1 g capsule   Yes No   Sig: Take 2 g by mouth 2 (two) times a day     ondansetron (ZOFRAN) 4 mg tablet   Yes No   Sig: Take by mouth   ondansetron (ZOFRAN-ODT) 4 mg disintegrating tablet   No No   Sig: Take 1 tablet by mouth every 8 (eight) hours as needed for nausea or vomiting   oxyCODONE-acetaminophen (PERCOCET) 5-325 mg per tablet   No No   Sig: Take 1-2 tablets by mouth every 6 (six) hours as needed for severe pain Max Daily Amount: 8 tablets   pancrelipase, Lip-Prot-Amyl, (CREON) 12,000 units capsule   Yes No   Sig: Take by mouth   pancrelipase, Lip-Prot-Amyl, (CREON) 24,000 units   No No   Sig: Take 1 capsule by mouth 3 (three) times a day with meals for 30 days   Patient taking differently: Take 12,000 Units by mouth daily     pantoprazole (PROTONIX) 40 mg   Yes No   Sig: Take by mouth   pantoprazole (PROTONIX) 40 mg tablet   No No   Sig: Take 1 tablet by mouth 2 (two) times a day   ranitidine (ZANTAC) 150 mg tablet   Yes No   Sig: Take 150 mg by mouth 2 (two) times a day   rosuvastatin (CRESTOR) 20 MG tablet   Yes No   Sig: Take 20 mg by mouth daily     sucralfate (CARAFATE) 1 g/10 mL suspension   No No   Sig: Take 10 mL by mouth 2 (two) times a day before meals   traZODone (DESYREL) 50 mg tablet  Self Yes No   Sig: Take 50 mg by mouth daily at bedtime      Facility-Administered Medications: None       Past Medical History:   Diagnosis Date    Asthma     Chronic pain disorder     GERD (gastroesophageal reflux disease)     Hyperlipidemia     Liver abscess     Meniscus tear     Pancreatitis        Past Surgical History:   Procedure Laterality Date    CHOLECYSTECTOMY      ESOPHAGOGASTRODUODENOSCOPY N/A 11/16/2017    Procedure: ESOPHAGOGASTRODUODENOSCOPY (EGD); Surgeon: Joslyn Lau MD;  Location: [facility];   Service: Gastroenterology    KNEE ARTHROSCOPY Bilateral     LIVER SURGERY      PANCREAS SURGERY      stents    PA INJECT NERV [address]  PLEXUS Bilateral 5/9/2017    Procedure: CELIAC PLEXUS BLOCK ;  Surgeon: Espinoza Wilkinson MD;  [facility];  Service: Pain Management     PA INJECT NERV BLCK,CELIAC PLEXUS Bilateral 6/1/2017    Procedure: SPLANCHNIC NERVE BLOCK at T12;  Surgeon: Espinoza Wilkinson MD;  [facility];  Service: Pain Management     PA INJECT NERV [address] Bilateral 8/8/2017    Procedure: BILATERAL SPLANCHNIC NERVE BLOCK T12;  Surgeon: Jos Marshall Nickolas Ugarte MD;  [facility];  Service: Pain Management     MS INJECT NERV BLCK,PARAVERT SYMPATH Bilateral 12/28/2017    Procedure: SPLANCHNIC NERVE BLOCK;  Surgeon: Jose J Murillo MD;  [facility];  Service: Pain Management     MS LAP,CHOLECYSTECTOMY N/A 2/14/2017    Procedure: LAPAROSCOPIC CHOLECYSTECTOMY, IOC, POSSIBLE OPEN ;  Surgeon: Laurinda Lundborg, MD;  [facility];  Service: General    ROTATOR CUFF REPAIR Right     SHOULDER ARTHROSCOPY      SHOULDER ARTHROSCOPY Right        Family History   Problem Relation Age of Onset    Cirrhosis Mother      I have reviewed and agree with the history as documented  Social History   Substance Use Topics    Smoking status: Current Every Day Smoker     Packs/day: 0 50     Years: 20 00    Smokeless tobacco: Never Used    Alcohol use Yes      Comment: rarely        Review of Systems   Skin: Positive for wound  Negative for pallor  Neurological: Negative for weakness and numbness  All other systems reviewed and are negative  Physical Exam  ED Triage Vitals   Temperature Pulse Respirations Blood Pressure SpO2   01/08/18 1138 01/08/18 1140 01/08/18 1140 01/08/18 1140 01/08/18 1140   97 7 °F (36 5 °C) 97 18 134/97 98 %      Temp Source Heart Rate Source Patient Position - Orthostatic VS BP Location FiO2 (%)   01/08/18 1138 01/08/18 1140 01/08/18 1140 01/08/18 1140 --   Oral Monitor Sitting Left arm       Pain Score       01/08/18 1140       6           Orthostatic Vital Signs  Vitals:    01/08/18 1140   BP: 134/97   Pulse: 97   Patient Position - Orthostatic VS: Sitting       Physical Exam   Constitutional: He is oriented to person, place, and time  He appears well-developed and well-nourished  HENT:   Head: Normocephalic and atraumatic  Eyes: Conjunctivae are normal  Right eye exhibits no discharge  Left eye exhibits no discharge  Neck: Normal range of motion  Neck supple  Pulmonary/Chest: Effort normal  No respiratory distress  Musculoskeletal: Normal range of motion  He exhibits tenderness  He exhibits no edema or deformity  There is a contusion to the medial malleolus  There is an abrasion  No lateral malleolus tenderness  No proximal 5th metatarsal tenderness  Neurovascular exam is normal   Simon Net test is negative  No fibular head tenderness  Rest of the ankle and foot are nontender  Neurological: He is alert and oriented to person, place, and time  Skin: Skin is warm and dry  Psychiatric: He has a normal mood and affect  His behavior is normal    Vitals reviewed  ED Medications  Medications   tetanus-diphtheria-acellular pertussis (BOOSTRIX) IM injection 0 5 mL (not administered)   bacitracin topical ointment 1 small application (not administered)       Diagnostic Studies  Results Reviewed     None                 XR ankle 2 views RIGHT   ED Interpretation by Andres George MD (01/08 2154)   No fracture or dislocation  Final Result by Tiffany Morin MD (01/08 9422)      No acute bony pathology  Punctate density below the skin medially, perhaps calcification or tiny foreign body  Workstation performed: [id number]                    Procedures  Procedures       Phone Contacts  ED Phone Contact    ED Course  ED Course                                MDM  Number of Diagnoses or Management Options  Diagnosis management comments: X-rays negative for fracture dislocation  There is a punctate calcification near the foot that may represent a foreign body  This is distant to the injury  I do not feel this is acute         Amount and/or Complexity of Data Reviewed  Tests in the radiology section of CPT®: ordered and reviewed      CritCare Time    Disposition  Final diagnoses:   Contusion of left ankle, initial encounter     Time reflects when diagnosis was documented in both MDM as applicable and the Disposition within this note     Time User Action Codes Description Comment    1/8/2018  2:05 PM Simón Mohan Antony SCHULTZ Add [S90 02XA] Contusion of left ankle, initial encounter       ED Disposition     ED Disposition Condition Comment    Discharge  Yosef Diaz discharge to home/self care  Condition at discharge: Good        Follow-up Information     Follow up With Specialties Details Why Contact Info    Judy Benoit DPM Podiatry In 1 week As needed; if not better [phone] [address]  [phone]          Patient's Medications   Discharge Prescriptions    No medications on file     No discharge procedures on file      ED Provider  Electronically Signed by           Darlyn Ochoa MD  01/08/18 7122

## 2018-01-09 NOTE — RESULT NOTES
Verified Results  (1) LIPID PANEL, FASTING 26Apr2017 12:04PM Erica Lozoya     Test Name Result Flag Reference   CHOLESTEROL, TOTAL 114 mg/dL L 125-200   HDL CHOLESTEROL 28 mg/dL L > OR = 40   TRIGLICERIDES 508 mg/dL H <150   LDL-CHOLESTEROL  mg/dL (calc)  <130   LDL cholesterol not calculated  Triglyceride levels  greater than 400 mg/dL invalidate calculated LDL results  Desirable range <100 mg/dL for patients with CHD or  diabetes and <70 mg/dL for diabetic patients with  known heart disease  CHOL/HDLC RATIO 4 1 (calc)  < OR = 5 0   NON HDL CHOLESTEROL 86 mg/dL (calc)     Target for non-HDL cholesterol is 30 mg/dL higher than   LDL cholesterol target  (1) COMPREHENSIVE METABOLIC PANEL [id number] 11:49DQ Eirca Lozoya     Test Name Result Flag Reference   GLUCOSE 100 mg/dL H 65-99   Fasting reference interval     For someone without known diabetes, a glucose value  between 100 and 125 mg/dL is consistent with  prediabetes and should be confirmed with a  follow-up test    UREA NITROGEN (BUN) 12 mg/dL  7-25   CREATININE 0 80 mg/dL  0 60-1 35   eGFR NON-AFR   AMERICAN 109 mL/min/1 73m2  > OR = 60   eGFR AFRICAN AMERICAN 126 mL/min/1 73m2  > OR = 60   BUN/CREATININE RATIO   3-14   NOT APPLICABLE (calc)   SODIUM 140 mmol/L  135-146   POTASSIUM 4 3 mmol/L  3 5-5 3   CHLORIDE 103 mmol/L     CARBON DIOXIDE 26 mmol/L  20-31   CALCIUM 9 9 mg/dL  8 6-10 3   PROTEIN, TOTAL 7 3 g/dL  6 1-8 1   ALBUMIN 5 1 g/dL  3 6-5 1   GLOBULIN 2 2 g/dL (calc)  1 9-3 7   ALBUMIN/GLOBULIN RATIO 2 3 (calc)  1 0-2 5   BILIRUBIN, TOTAL 1 8 mg/dL H 0 2-1 2   ALKALINE PHOSPHATASE 70 U/L     AST 27 U/L  10-40   ALT 24 U/L  9-46     (1) LIPASE 26Apr2017 12:04PM Erica Lozoya   REPORT COMMENT:  FASTING:YES     Test Name Result Flag Reference   LIPASE 15 U/L  7-60

## 2018-01-10 NOTE — RESULT NOTES
Verified Results  (1) LIPID PANEL, FASTING [id number] 12:58PM [facility]     Test Name Result Flag Reference   CHOLESTEROL, TOTAL 108 mg/dL L 125-200   HDL CHOLESTEROL 28 mg/dL L > OR = 40   TRIGLICERIDES 661 mg/dL  <150   LDL-CHOLESTEROL 57 mg/dL (calc)  <130   Desirable range <100 mg/dL for patients with CHD or  diabetes and <70 mg/dL for diabetic patients with  known heart disease  CHOL/HDLC RATIO 3 9 (calc)  < OR = 5 0   NON HDL CHOLESTEROL 80 mg/dL (calc)     Target for non-HDL cholesterol is 30 mg/dL higher than   LDL cholesterol target  (1) CBC/PLT/DIFF [id number] 12:58PM [facility]     Test Name Result Flag Reference   WHITE BLOOD CELL COUNT 7 5 Thousand/uL  3 8-10 8   RED BLOOD CELL COUNT 4 20 Million/uL  4 20-5 80   HEMOGLOBIN 12 7 g/dL L 13 2-17 1   HEMATOCRIT 38 3 % L 38 5-50 0   MCV 91 2 fL  80 0-100 0   MCH 30 2 pg  27 0-33 0   EOSINOPHILS 3 0 %     BASOPHILS 0 5 %     ABSOLUTE MONOCYTES 375 cells/uL  200-950   ABSOLUTE EOSINOPHILS 225 cells/uL     ABSOLUTE BASOPHILS 38 cells/uL  0-200   NEUTROPHILS 58 7 %     LYMPHOCYTES 32 8 %     MONOCYTES 5 0 %     MCHC 33 1 g/dL  32 0-36 0   RDW 14 1 %  11 0-15 0   PLATELET COUNT 118 Thousand/uL  140-400   MPV 8 2 fL  7 5-11 5   ABSOLUTE NEUTROPHILS 4403 cells/uL  7748-5376   ABSOLUTE LYMPHOCYTES 2460 cells/uL  850-3900     (1) URINE CULTURE [id number] 12:58PM Komli Mediauchard   REPORT COMMENT:  FASTING:YES     Test Name Result Flag Reference   CULTURE, URINE, ROUTINE      CULTURE, URINE, ROUTINE         MICRO NUMBER:      [id number]    TEST STATUS:       FINAL    SPECIMEN SOURCE:   URINE    SPECIMEN QUALITY:  ADEQUATE    RESULT:            No Growth     (1) COMPREHENSIVE METABOLIC PANEL [id number] 93:32KX [facility]     Test Name Result Flag Reference   GLUCOSE 89 mg/dL  65-99   Fasting reference interval   UREA NITROGEN (BUN) 10 mg/dL  7-25   CREATININE 0 66 mg/dL  0 60-1 35   eGFR NON-AFR   AMERICAN 118 mL/min/1 73m2  > OR = 60   eGFR AFRICAN AMERICAN 137 mL/min/1 73m2  > OR = 60   BUN/CREATININE RATIO   3-97   NOT APPLICABLE (calc)   ALT 13 U/L  9-46   ALBUMIN 4 2 g/dL  3 6-5 1   GLOBULIN 3 7 g/dL (calc)  1 9-3 7   ALBUMIN/GLOBULIN RATIO 1 1 (calc)  1 0-2 5   BILIRUBIN, TOTAL 0 4 mg/dL  0 2-1 2   ALKALINE PHOSPHATASE 76 U/L     AST 15 U/L  10-40   SODIUM 137 mmol/L  135-146   POTASSIUM 4 0 mmol/L  3 5-5 3   CHLORIDE 101 mmol/L     CARBON DIOXIDE 25 mmol/L  20-31   CALCIUM 9 8 mg/dL  8 6-10 3   PROTEIN, TOTAL 7 9 g/dL  6 1-8 1       Discussion/Summary   inform pt of labs    chol fine   liver function normal    how is he feeling

## 2018-01-10 NOTE — RESULT NOTES
Message   ensure labs get to pt's GI   md sharp   does [facility] have a lipidologist?     Verified Results  (1) LIPID PANEL, FASTING [id number] 10:00AM [facility] Bells     Test Name Result Flag Reference   CHOLESTEROL, TOTAL 170 mg/dL  125-200   HDL CHOLESTEROL 32 mg/dL L > OR = 40   TRIGLICERIDES 928 mg/dL H <150   LDL-CHOLESTEROL  mg/dL (calc)  <130   LDL cholesterol not calculated  Triglyceride levels  greater than 400 mg/dL invalidate calculated LDL results  Desirable range <100 mg/dL for patients with CHD or  diabetes and <70 mg/dL for diabetic patients with  known heart disease  CHOL/HDLC RATIO 5 3 (calc) H < OR = 5 0   NON HDL CHOLESTEROL 138 mg/dL (calc)     Target for non-HDL cholesterol is 30 mg/dL higher than   LDL cholesterol target  (1) COMPREHENSIVE METABOLIC PANEL [id number] 14:18IA [facility]     Test Name Result Flag Reference   GLUCOSE 95 mg/dL  65-99   Fasting reference interval   UREA NITROGEN (BUN) 7 mg/dL  7-25   CREATININE 0 77 mg/dL  0 60-1 35   eGFR NON-AFR   AMERICAN 111 mL/min/1 73m2  > OR = 60   eGFR AFRICAN AMERICAN 129 mL/min/1 73m2  > OR = 60   BUN/CREATININE RATIO   0-08   NOT APPLICABLE (calc)    U/L H 9-46   ALBUMIN 4 4 g/dL  3 6-5 1   GLOBULIN 2 2 g/dL (calc)  1 9-3 7   ALBUMIN/GLOBULIN RATIO 2 0 (calc)  1 0-2 5   BILIRUBIN, TOTAL 1 1 mg/dL  0 2-1 2   ALKALINE PHOSPHATASE 75 U/L     AST 96 U/L H 10-40   SODIUM 140 mmol/L  135-146   POTASSIUM 4 2 mmol/L  3 5-5 3   CHLORIDE 104 mmol/L     CARBON DIOXIDE 28 mmol/L  20-31   CALCIUM 9 2 mg/dL  8 6-10 3   PROTEIN, TOTAL 6 6 g/dL  6 1-8 1     (1) CBC/PLT/DIFF [id number] 10:00AM [facility]     Test Name Result Flag Reference   WHITE BLOOD CELL COUNT 4 9 Thousand/uL  3 8-10 8   RED BLOOD CELL COUNT 4 75 Million/uL  4 20-5 80   HEMOGLOBIN 15 1 g/dL  13 2-17 1   HEMATOCRIT 45 1 %  38 5-50 0   MCV 94 9 fL  80 0-100 0   MCH 31 8 pg  27 0-33 0   EOSINOPHILS 1 8 %     BASOPHILS 0 5 %     ABSOLUTE MONOCYTES 358 cells/uL  200-950   ABSOLUTE EOSINOPHILS 88 cells/uL     ABSOLUTE BASOPHILS 25 cells/uL  0-200   NEUTROPHILS 56 2 %     LYMPHOCYTES 34 2 %     MONOCYTES 7 3 %     MCHC 33 5 g/dL  32 0-36 0   RDW 13 4 %  11 0-15 0   PLATELET COUNT 193 Thousand/uL  140-400   MPV 9 2 fL  7 5-11 5   ABSOLUTE NEUTROPHILS 2754 cells/uL  8123-8665   ABSOLUTE LYMPHOCYTES 1676 cells/uL  850-3900     (Q) URINALYSIS REFLEX [id number] 10:00AM Kaley Kelley   REPORT COMMENT:  FASTING:YES     Test Name Result Flag Reference   COLOR YELLOW  YELLOW   APPEARANCE CLEAR  CLEAR   SPECIFIC GRAVITY 1 007  1 001-1 035   PH 7 0  5 0-8 0   GLUCOSE NEGATIVE  NEGATIVE   BILIRUBIN NEGATIVE  NEGATIVE   KETONES NEGATIVE  NEGATIVE   OCCULT BLOOD NEGATIVE  NEGATIVE   PROTEIN NEGATIVE  NEGATIVE   NITRITE NEGATIVE  NEGATIVE   LEUKOCYTE ESTERASE NEGATIVE  NEGATIVE       Discussion/Summary   inform pt chol numbers climbing   along with liver function    review diet    confim he is taking his meds

## 2018-01-11 NOTE — RESULT NOTES
Message   Recorded as Task   Date: 06/02/2017 09:08 AM, Created By: Derek Moe   Task Name: Follow Up   Assigned To: Caprice Zhu   Regarding Patient: Gianfranco Gallagher, Status: Active   Comment:    Caprice Zhu - 02 Jun 2017 9:08 AM     TASK CREATED  F/u procedure-Celiac Plexus Sympathetic Block on 6/1/17  Pt reports site soreness that he rates a 4-5/10  Pain is located in his low back and the L side of his abdomen  Site is clean and dry with no s/s of injection  No redness, drainage, or swelling  No fever  Is to make an appoint in 3-4 wks     To Carr - 80 Jun 2017 10:40 AM     TASK REPLIED TO: Previously Assigned To To alvares md aware        Signatures   Electronically signed by : Sophie Landin, ; Jun 2 2017 11:47AM EST                       (Author)

## 2018-01-11 NOTE — PROCEDURES
Procedures by Ismael Brown MD at 5/9/2017  10:50 AM      Author:  Ismael Brown MD Service:  Anesthesiology Author Type:  Physician     Filed:  5/11/2017 10:20 AM Date of Service:  5/9/2017 10:50 AM Status:  Signed     :  Ismael Brown MD (Physician)            PROCEDURE:  1) Bilateral Splanchnic nerve block/Celiac Plexus Nerve Block             2) Fluoroscopic needle guidance   DIAGNOSIS: 1) Chronic Abdominal Pain           2) Chronic Pancreatitis   PHYSICIAN:  DAINA Perrinuyler Prose: 12 mL of bupivacaine 0 25% (6 mL per side) plus 20mg of dexamethasone (10mg per side) in 4cc of sterile saline  LOCAL ANESTHETIC USED: 4 mL of 1% lidocaine  ESTIMATED BLOOD LOSS: None  COMPLICATIONS: None     TECHNIQUE: Time-out was taken to identify the correct patient, procedure and side prior to starting the procedure  With the patient lying prone, the area to be injected was widely prepped and draped in the usual sterile fashion using DuraPrep  and a drape  The anatomical target site was determined using fluoroscopy by squaring off the superior endplate of Y65, ipsilaterally obliquing the C-arm intensifier until the tip of the T12 transverse process was at the anterolateral border of the T12  vertebral body, and then moving the C-arm intensifier caudal to move the 12th rib out of the fluoroscopic target view  Local anesthetic was given by raising a skin wheal and going down to the hub of the 27-gauge 1 25-inch needle  A 22-gauge 5-inch Quincke  needle was advanced at the midbody of T12 to a point in the anterior one-third of the T12 vertebral body utilizing A-P and lateral intermittent fluoroscopy  2-3 mL of Omnipaque 240 contrast was injected to show unilateral spread along the anterolateral  surface of the T12 vertebral body, and no vascular uptake   The above technique was then repeated for the opposite side with contrast injected again to show unilateral spread superior and inferior along the anterolateral T12 body  Medication was then injected  slowly in 3 mL increments after negative aspirations of the needle to confirm the needle had not slipped intravascular  The procedure was completed without complications and was tolerated well  The patient was monitored after the procedure  The patient  and his wife was given post-procedure and discharge instructions to follow at home  The patient was discharged in stable condition  A follow-up appointment was made  Pre-procedure pain score: 7-8/10   Post-procedure pain score: 4-5/10             Received Niko Leigh MD  May 11 2017 10:21AM Eastern Standard Time

## 2018-01-11 NOTE — MISCELLANEOUS
Message   Recorded as Task   Date: 08/22/2017 01:23 PM, Created By: Jeannie Weathers   Task Name: Miscellaneous   Assigned To: [facility] es clinical,Team   Regarding Patient: Shweta Rowan, Status: In Progress   Comment:    Ellen Daniel - 22 Aug 2017 1:23 PM     TASK CREATED  pt is calling and will be out of the lorzone samples that were given to him to hold him over until it will come from the specialty pharmacy  he is just calling to confirm to make sure that everything was sent and that he will be recieving his prescription   # [phone]   KingMariluz - 22 Aug 2017 1:40 PM     TASK IN PROGRESS   Mariluz Granado - 22 Aug 2017 1:52 PM     TASK EDITED  S/w [place] [facility]  They do not have a script for pt  Will refax order form at this time  S/w pt and advised of above  Informed him that we will set samples aside for him and he will  today  LoyMariluz - 22 Aug 2017 2:09 PM     TASK EDITED  Pt arrived to  samples  Active Problems    1  Abnormal LFTs (790 6) (R79 89)   2  Asthma due to seasonal allergies (493 90) (J45 909)   3  Chronic pain (338 29) (G89 29)   4  Chronic pain syndrome (338 4) (G89 4)   5  Chronic pancreatitis, unspecified pancreatitis type (577 1) (K86 1)   6  Gallbladder sludge (575 8) (K82 8)   7  High triglycerides (272 1) (E78 1)   8  History of allergy (V15 09) (Z88 9)   9  Hyperlipemia, mixed (272 2) (E78 2)   10  Insomnia, persistent (307 42) (G47 00)   11  Lesion of liver (573 8) (K76 9)   12  Liver abscess (572 0) (K75 0)   13  Muscular aches (729 1) (M79 1)   14  Nausea and vomiting (787 01) (R11 2)   15  Pancreatic pseudocyst/cyst (577 2) (K86 2,K86 3)   16  Postoperative state (V45 89) (Z98 890)   17  Renal mass (593 9) (N28 89)   18  Urinary retention (788 20) (R33 9)    Current Meds   1  Aspirin 325 MG Oral Tablet; TAKE 1 TABLET DAILY; Therapy: (Recorded:93Fwr4531) to Recorded   2  Creon 56401 UNIT Oral Capsule Delayed Release Particles;  Take 1 tablet with each snack between meals; Therapy: 53Cft2701 to (Last Rx:80Ilv2734)  Requested for: 13Apr2017; Status: ACTIVE   - Transmit to Pharmacy - Awaiting Verification Ordered   3  Creon 73484 UNIT Oral Capsule Delayed Release Particles; TAKE 1 CAPSULE 3 times   daily; Therapy: 53Evu8036 to (Evaluate:63Qki4898)  Requested for: 24Jan2017; Last   Rx:24Jan2017; Status: ACTIVE - Transmit to Pharmacy - Awaiting Verification Ordered   4  EpiPen 2-Larry 0 3 MG/0 3ML Injection Solution Auto-injector; INJECT 0 3ML   INTRAMUSCULARLY AS DIRECTED  Requested for: 28Eqh9253; Last Rx:72Ufb3542   Ordered   5  Fenofibric Acid 135 MG Oral Capsule Delayed Release (Trilipix); TAKE ONE CAPSULE   BY MOUTH EVERY DAY; Therapy: 76SGB9132 to (Evaluate:88Mho1403)  Requested for: 56Nvm2361; Last   Rx:17Ybv7358 Ordered   6  Flaxseed (Linseed) 1000 MG CAPS; TAKE 3 TABLET BY MOUTH DAILY; Therapy: (Floydene Jorje) to Recorded   7  Niacin ER (Antihyperlipidemic) 500 MG Oral Tablet Extended Release; TAKE ONE   TABLET BY MOUTH NIGHTLY AT BEDTIME; Therapy: 28BYF5165 to (Evaluate:52Vjr4640)  Requested for: 22Aug2017; Last   Rx:69Qdn1233; Status: [address] to [facility] Verification Ordered   8  Omega-3-acid Ethyl Esters 1 GM Oral Capsule (Lovaza); TAKE 2 CAPSULES BY MOUTH   TWICE DAILY; Therapy: 36Oll4650 to (Evaluate:58Vts1485)  Requested for: 52Hec5284; Last   Rx:26Qfl9257 Ordered   9  Ondansetron HCl - 4 MG Oral Tablet; TAKE 1 TABLET Every 8 hours PRN nausea and   vomiting; Therapy: 78Vds3605 to (Evaluate:43Ogl4149)  Requested for: 48Tfg1882; Last   Rx:99Jfl0847; Status: ACTIVE - Transmit to [facility] Verification Ordered   10  Protonix 40 MG Oral Packet; take 1 tablet by mouth an 1/2 hour before breakfast daily; Therapy: (Floydene Jorje) to Recorded   11   Rosuvastatin Calcium 20 MG Oral Tablet (Crestor); TAKE 1 TABLET DAILY  Requested    for: 18Apr2017; Last Rx:18Apr2017; Status: ACTIVE - Transmit to Pharmacy - Awaiting    Verification Ordered   12  TraZODone HCl - 50 MG Oral Tablet; TAKE ONE TABLET BY MOUTH NIGHTLY AT    BEDTIME AS NEEDED FOR SLEEP; Therapy: 08LIX9171 to (Evaluate:62Mcv1103)  Requested for: 00CDH8829; Last    Rx:09Mar2017 Ordered   13  Ventolin  (90 Base) MCG/ACT Inhalation Aerosol Solution; INHALE 1 TO 2    PUFFS EVERY 4 TO 6 HOURS AS NEEDED; Therapy: 40BHZ9952 to (Last Rx:08Aug2017)  Requested for: 93Yca9101 Ordered    Allergies    1  No Known Drug Allergies    2   Bee sting    Signatures   Electronically signed by : Candance Deed, ; Aug 22 2017  2:10PM EST                       (Author)

## 2018-01-11 NOTE — MISCELLANEOUS
Message   Recorded as Task   Date: 09/14/2017 07:52 AM, Created By: Patel Amato   Task Name: Care Coordination   Assigned To: [facility] es clinical,Team   Regarding Patient: Tyler Sebastian, Status: In Progress   Comment:    Cyndi Merritt - 14 Sep 2017 7:52 AM     TASK CREATED  [employer]  Phone: [phone]   Message: SHE IS CALLING TO DETERMINE IF DR PROVIDED ANY RESTRICTIONS & OR ANY ORDERS, PLS CB  Mariluz Granado - 14 Sep 2017 8:20 AM     TASK IN PROGRESS   Mariluz Granado - 14 Sep 2017 8:21 AM     TASK EDITED  Returned call but Giuliana Pastor not in until 9AM  Pt is seeing SI 9/15  Mariluz Granado - 14 Sep 2017 9:42 AM     TASK EDITED  lmom for Sayda to   Mariluz Granado - 19 Sep 2017 8:31 AM     TASK EDITED  2 attempts made to reach Giuliana Pastor  Will await her return call if she still needs information regarding pt  Active Problems    1  Abnormal LFTs (790 6) (R79 89)   2  Asthma due to seasonal allergies (493 90) (J45 909)   3  Chronic pain (338 29) (G89 29)   4  Chronic pain syndrome (338 4) (G89 4)   5  Chronic pancreatitis, unspecified pancreatitis type (577 1) (K86 1)   6  Gallbladder sludge (575 8) (K82 8)   7  High triglycerides (272 1) (E78 1)   8  History of allergy (V15 09) (Z88 9)   9  Hyperlipemia, mixed (272 2) (E78 2)   10  Insomnia, persistent (307 42) (G47 00)   11  Lesion of liver (573 8) (K76 9)   12  Liver abscess (572 0) (K75 0)   13  Muscular aches (729 1) (M79 1)   14  Nausea and vomiting (787 01) (R11 2)   15  Pancreatic pseudocyst/cyst (577 2) (K86 2,K86 3)   16  Postoperative state (V45 89) (Z98 890)   17  Renal mass (593 9) (N28 89)   18  Urinary retention (788 20) (R33 9)    Current Meds   1  Aspirin 325 MG Oral Tablet; TAKE 1 TABLET DAILY; Therapy: (Recorded:75Oxd2078) to Recorded   2  Creon 57650 UNIT Oral Capsule Delayed Release Particles; Take 1 tablet with each   snack between meals;    Therapy: 13Apr2017 to (Last Rx:13Apr2017)  Requested for: 13Apr2017; Status: ACTIVE   - Transmit to Pharmacy - Awaiting Verification Ordered   3  Creon 43911 UNIT Oral Capsule Delayed Release Particles; TAKE 1 CAPSULE 3 times   daily; Therapy: 90Dcf3866 to (Evaluate:64Erc9019)  Requested for: 24Jan2017; Last   Rx:24Jan2017; Status: ACTIVE - Transmit to Pharmacy - Awaiting Verification Ordered   4  EpiPen 2-Larry 0 3 MG/0 3ML Injection Solution Auto-injector; INJECT 0 3ML   INTRAMUSCULARLY AS DIRECTED  Requested for: 21Aug2017; Last Rx:21Aug2017   Ordered   5  Fenofibric Acid 135 MG Oral Capsule Delayed Release (Trilipix); TAKE ONE CAPSULE   BY MOUTH EVERY DAY; Therapy: 11BAR7441 to (Evaluate:18Ggz8223)  Requested for: 38Ofj3442; Last   Rx:32Kex4814 Ordered   6  Flaxseed (Linseed) 1000 MG CAPS; TAKE 3 TABLET BY MOUTH DAILY; Therapy: (Justine Shaw) to Recorded   7  Niacin ER (Antihyperlipidemic) 500 MG Oral Tablet Extended Release; TAKE ONE   TABLET BY MOUTH NIGHTLY AT BEDTIME; Therapy: 42IGH0917 to (Evaluate:58Dpr0465)  Requested for: 30Hqj7516; Last   Rx:27Xwn1287 Ordered   8  Omega-3-acid Ethyl Esters 1 GM Oral Capsule (Lovaza); TAKE 2 CAPSULES BY MOUTH   TWICE DAILY; Therapy: 03Azf2841 to (Evaluate:23Bkx4217)  Requested for: 96Rad1689; Last   Rx:66Qgb1361 Ordered   9  Ondansetron HCl - 4 MG Oral Tablet; TAKE 1 TABLET Every 8 hours PRN nausea and   vomiting; Therapy: 97Rdt7054 to (Evaluate:20Zae5359)  Requested for: 24Njv2902; Last   Rx:99Oue3579; Status: ACTIVE - Transmit to [facility] Verification Ordered   10  Protonix 40 MG Oral Packet; take 1 tablet by mouth an 1/2 hour before breakfast daily; Therapy: (Justine hSaw) to Recorded   11  Rosuvastatin Calcium 20 MG Oral Tablet (Crestor); TAKE 1 TABLET DAILY  Requested    for: 76Bma8918; Last Rx:04Lro5651; Status: ACTIVE - Transmit to Pharmacy - Awaiting    Verification Ordered   12  TraZODone HCl - 50 MG Oral Tablet; TAKE ONE TABLET BY MOUTH NIGHTLY AT    BEDTIME AS NEEDED FOR SLEEP;     Therapy: 84FAQ2731 to (Evaluate:65Hsr7184)  Requested for: 79JHA5635; Last    Rx:09Mar2017 Ordered   13  Ventolin  (90 Base) MCG/ACT Inhalation Aerosol Solution; INHALE 1 TO 2    PUFFS EVERY 4 TO 6 HOURS AS NEEDED; Therapy: 44MPQ1518 to (Last Rx:68Rvr8068)  Requested for: 83Zng5328 Ordered    Allergies    1  No Known Drug Allergies    2   Bee sting    Signatures   Electronically signed by : Oneyda Pritchard, ; Sep 19 2017  8:31AM EST                       (Author)

## 2018-01-11 NOTE — MISCELLANEOUS
Message   Recorded as Task   Date: 06/27/2017 11:39 AM, Created By: Junior Kennedy   Task Name: Miscellaneous   Assigned To: SPA es clinical,Team   Regarding Patient: Tari Barragan, Status: In Progress   Comment:    FranckjoseJanett - 27 Jun 2017 11:39 AM     TASK CREATED  phone call from patient stating that he only has three days left of nuerontin  patient questioning if he is supposed to be continuing this medication? patient's pharamacist told him that if he just stops taking this medication he could be at risk for seizures  please call patient at [phone]  Elena Farooq - 27 Jun 2017 12:02 PM     TASK IN PROGRESS   Elena Farooq - 27 Jun 2017 12:16 PM     TASK EDITED  Spoke to pt, he is currently taking gabapentin 300mg tid since 6/1/17  Pt has noticed 30-40% improvement in his pain and has been able to more active  He did have alot of drowsy/sleepiness last week but reports it has improved recently  If pt is to continue on the gabapentin requesting a refill to [facility] on file  Pt has an sovs on 7/3/17  Fran Gee - 27 Jun 2017 1:15 PM     TASK REPLIED TO: Previously Assigned To Fran eGe  Refill sent to [facility] - 27 Jun 2017 1:27 PM     TASK IN PROGRESS   Elena Farooq - 27 Jun 2017 1:31 PM     TASK EDITED  Pt aware to continue gabapentin, refilled sent & confirmed sovs         Active Problems    1  Abnormal LFTs (790 6) (R79 89)   2  Asthma due to seasonal allergies (493 90) (J45 909)   3  Chronic pain (338 29) (G89 29)   4  Chronic pain syndrome (338 4) (G89 4)   5  Chronic pancreatitis, unspecified pancreatitis type (577 1) (K86 1)   6  Gallbladder sludge (575 8) (K82 8)   7  High triglycerides (272 1) (E78 1)   8  History of allergy (V15 09) (Z88 9)   9  Hyperlipemia, mixed (272 2) (E78 2)   10  Insomnia, persistent (307 42) (G47 00)   11  Lesion of liver (573 8) (K76 9)   12  Liver abscess (572 0) (K75 0)   13   Muscular aches (729 1) (M79 1) 14  Pancreatic pseudocyst/cyst (577 2) (K86 2,K86 3)   15  Postoperative state (V45 89) (Z98 890)   16  Renal mass (593 9) (N28 89)    Current Meds   1  Aspirin 325 MG Oral Tablet; TAKE 1 TABLET DAILY; Therapy: (Recorded:23Fds9017) to Recorded   2  Creon 47412 UNIT Oral Capsule Delayed Release Particles; Take 1 tablet with each   snack between meals; Therapy: 88Hld9364 to (Last Rx:13Apr2017)  Requested for: 13Apr2017; Status: ACTIVE   - Transmit to Pharmacy - Awaiting Verification Ordered   3  Creon 80294 UNIT Oral Capsule Delayed Release Particles; TAKE 1 CAPSULE 3 times   daily; Therapy: 36Eia1174 to (Evaluate:92Tdu8926)  Requested for: 24Jan2017; Last   Rx:24Jan2017; Status: ACTIVE - Transmit to Pharmacy - Awaiting Verification Ordered   4  EpiPen 2-Larry 0 3 MG/0 3ML Injection Solution Auto-injector; INJECT 0 3ML   INTRAMUSCULARLY AS DIRECTED  Requested for: 32Nrg0403; Last Rx:20Gdn6352;   Status: ACTIVE - Transmit to Pharmacy - Awaiting Verification Ordered   5  Fenofibric Acid 135 MG Oral Capsule Delayed Release (Trilipix); TAKE ONE CAPSULE   BY MOUTH EVERY DAY; Therapy: 29HDK6064 to (Evaluate:71Xji2027)  Requested for: 43AKX9675; Last   Rx:09Mar2017; Status: ACTIVE - Transmit to [facility] Verification Ordered   6  Flaxseed (Linseed) 1000 MG CAPS; TAKE 3 TABLET BY MOUTH DAILY; Therapy: (Lola Rincon) to Recorded   7  Gabapentin 300 MG Oral Capsule; TAKE 1 CAPSULE 3 TIMES DAILY; Therapy: 80FDT1662 to (Evaluate:88Bix1838); Last MT:62HPR6938 Ordered   8  Niacin ER (Antihyperlipidemic) 500 MG Oral Tablet Extended Release; TAKE ONE   TABLET BY MOUTH NIGHTLY AT BEDTIME; Therapy: 08PSS6399 to (Evaluate:39Hwe0478)  Requested for: 81RPE3047; Last   Rx:17Jun2017 Ordered   9  Omega-3-acid Ethyl Esters 1 GM Oral Capsule (Lovaza); TAKE 2 CAPSULES BY MOUTH   TWICE DAILY; Therapy: 88Jxy3709 to ([phone])  Requested for: 24Apr2017; Last   Rx:24Apr2017 Ordered   10   Protonix 40 MG Oral Packet; take 1 tablet by mouth an 1/2 hour before breakfast daily; Therapy: (Naseem Fagan) to Recorded   11  Rosuvastatin Calcium 20 MG Oral Tablet (Crestor); TAKE 1 TABLET DAILY  Requested    for: 18Apr2017; Last Rx:18Apr2017; Status: ACTIVE - Transmit to Pharmacy - Awaiting    Verification Ordered   12  TraZODone HCl - 50 MG Oral Tablet; TAKE ONE TABLET BY MOUTH NIGHTLY AT    BEDTIME AS NEEDED FOR SLEEP; Therapy: 69BNA3889 to (Evaluate:28Wxj4537)  Requested for: 81GKZ1110; Last    Rx:09Mar2017 Ordered   13  Ventolin  (90 Base) MCG/ACT Inhalation Aerosol Solution; INHALE 1 TO 2    PUFFS EVERY 4 TO 6 HOURS AS NEEDED; Therapy: 76EDU8943 to (Last Rx:29Csk6467)  Requested for: 18Csa7044 Ordered    Allergies    1  No Known Drug Allergies    2   Bee sting    Signatures   Electronically signed by : Risa Gardner RN; Jun 27 2017  1:31PM EST                       (Author)

## 2018-01-11 NOTE — MISCELLANEOUS
Attention disability company        This letter is in reference to [employer]  He has inflammation in his pancreas and his liver and his gallbladder and will be undergoing a gallbladder removal on February 14  He will then have a follow-up on March 2 I expect she will be able to return to work on March 3  He continues to be debilitated with abdominal pain and symptoms related to this problem please contact me for further questions or if more information  as needed  Sincerely          Carie BRANNON          Electronically signed by:Rajiv Griffiths MD  Feb  3 2017  5:41PM EST

## 2018-01-12 VITALS
BODY MASS INDEX: 26.83 KG/M2 | DIASTOLIC BLOOD PRESSURE: 66 MMHG | TEMPERATURE: 97.4 F | SYSTOLIC BLOOD PRESSURE: 128 MMHG | HEART RATE: 71 BPM | OXYGEN SATURATION: 96 % | WEIGHT: 192.38 LBS

## 2018-01-12 VITALS
WEIGHT: 189.38 LBS | SYSTOLIC BLOOD PRESSURE: 118 MMHG | DIASTOLIC BLOOD PRESSURE: 88 MMHG | BODY MASS INDEX: 26.41 KG/M2

## 2018-01-12 VITALS
WEIGHT: 177.4 LBS | BODY MASS INDEX: 24.03 KG/M2 | DIASTOLIC BLOOD PRESSURE: 100 MMHG | SYSTOLIC BLOOD PRESSURE: 140 MMHG | HEIGHT: 72 IN | HEART RATE: 72 BPM

## 2018-01-12 VITALS
BODY MASS INDEX: 25.69 KG/M2 | TEMPERATURE: 97.8 F | HEART RATE: 72 BPM | SYSTOLIC BLOOD PRESSURE: 152 MMHG | HEIGHT: 71 IN | DIASTOLIC BLOOD PRESSURE: 100 MMHG | WEIGHT: 183.5 LBS

## 2018-01-12 VITALS
BODY MASS INDEX: 27.06 KG/M2 | DIASTOLIC BLOOD PRESSURE: 80 MMHG | HEIGHT: 71 IN | HEART RATE: 91 BPM | OXYGEN SATURATION: 97 % | WEIGHT: 193.25 LBS | SYSTOLIC BLOOD PRESSURE: 142 MMHG

## 2018-01-12 NOTE — MISCELLANEOUS
Message  Return to work or school:   Alfa Hoffmann is under my professional care  He was seen in my office on March 23   He is able to return to work on   April 13            Signatures   Electronically signed by : Cuba Monaco, [facility]; Mar 30 2017  3:06PM EST                       (Author)

## 2018-01-13 VITALS
WEIGHT: 178.5 LBS | SYSTOLIC BLOOD PRESSURE: 124 MMHG | HEIGHT: 71 IN | BODY MASS INDEX: 24.99 KG/M2 | HEART RATE: 92 BPM | DIASTOLIC BLOOD PRESSURE: 90 MMHG

## 2018-01-13 VITALS
SYSTOLIC BLOOD PRESSURE: 136 MMHG | TEMPERATURE: 98.1 F | HEIGHT: 72 IN | RESPIRATION RATE: 16 BRPM | DIASTOLIC BLOOD PRESSURE: 90 MMHG | HEART RATE: 70 BPM | WEIGHT: 173 LBS | BODY MASS INDEX: 23.43 KG/M2

## 2018-01-13 VITALS
DIASTOLIC BLOOD PRESSURE: 76 MMHG | SYSTOLIC BLOOD PRESSURE: 136 MMHG | HEIGHT: 71 IN | BODY MASS INDEX: 27.21 KG/M2 | WEIGHT: 194.38 LBS | HEART RATE: 80 BPM

## 2018-01-13 VITALS
HEIGHT: 71 IN | DIASTOLIC BLOOD PRESSURE: 82 MMHG | OXYGEN SATURATION: 98 % | HEART RATE: 85 BPM | SYSTOLIC BLOOD PRESSURE: 124 MMHG | WEIGHT: 188 LBS | BODY MASS INDEX: 26.32 KG/M2

## 2018-01-13 VITALS
BODY MASS INDEX: 25.19 KG/M2 | HEIGHT: 72 IN | DIASTOLIC BLOOD PRESSURE: 70 MMHG | WEIGHT: 186 LBS | SYSTOLIC BLOOD PRESSURE: 118 MMHG

## 2018-01-13 VITALS
DIASTOLIC BLOOD PRESSURE: 84 MMHG | HEIGHT: 71 IN | BODY MASS INDEX: 26.51 KG/M2 | SYSTOLIC BLOOD PRESSURE: 130 MMHG | HEART RATE: 84 BPM | WEIGHT: 189.38 LBS

## 2018-01-13 VITALS
SYSTOLIC BLOOD PRESSURE: 120 MMHG | BODY MASS INDEX: 24.69 KG/M2 | HEIGHT: 72 IN | HEART RATE: 78 BPM | DIASTOLIC BLOOD PRESSURE: 84 MMHG | WEIGHT: 182.25 LBS | OXYGEN SATURATION: 98 %

## 2018-01-13 NOTE — MISCELLANEOUS
Message   Recorded as Task   Date: 08/18/2017 02:55 PM, Created By: Shadia Cheung   Task Name: Miscellaneous   Assigned To: SPA es clinical,Team   Regarding Patient: Gianfranco Gallagher, Status: Active   CommentSarah Fontaine - 18 Aug 2017 2:55 PM     TASK CREATED  Message from [address] ([phone]) was left with the answering service for this pt  Lorzone 750mg twice a day was supposed to be called in on 8/16  Only has 1 pill left/ please call pt when it is sent in  Thank You! Cuba Epp - 18 Aug 2017 2:57 PM     TASK REASSIGNED: Previously Assigned To LUCRECIA Villavicencio - 18 Aug 2017 3:12 PM     TASK REPLIED TO: Previously Assigned To To Carr  The prescription goes through the specialty pharmacy-[facility] number [phone]  The order was sent out yesterday  Cuba Epp - 18 Aug 2017 3:36 PM     TASK EDITED  S/w pt and informed him that med comes from specialty pharmacy and that they may call to verify insurance and/or shipment of med  He verbalized understanding  Active Problems    1  Abnormal LFTs (790 6) (R79 89)   2  Asthma due to seasonal allergies (493 90) (J45 909)   3  Chronic pain (338 29) (G89 29)   4  Chronic pain syndrome (338 4) (G89 4)   5  Chronic pancreatitis, unspecified pancreatitis type (577 1) (K86 1)   6  Gallbladder sludge (575 8) (K82 8)   7  High triglycerides (272 1) (E78 1)   8  History of allergy (V15 09) (Z88 9)   9  Hyperlipemia, mixed (272 2) (E78 2)   10  Insomnia, persistent (307 42) (G47 00)   11  Lesion of liver (573 8) (K76 9)   12  Liver abscess (572 0) (K75 0)   13  Muscular aches (729 1) (M79 1)   14  Nausea and vomiting (787 01) (R11 2)   15  Pancreatic pseudocyst/cyst (577 2) (K86 2,K86 3)   16  Postoperative state (V45 89) (Z98 890)   17  Renal mass (593 9) (N28 89)   18  Urinary retention (788 20) (R33 9)    Current Meds   1  Aspirin 325 MG Oral Tablet; TAKE 1 TABLET DAILY;    Therapy: (Recorded:02Bkw4527) to Recorded   2  Creon 58538 UNIT Oral Capsule Delayed Release Particles; Take 1 tablet with each   snack between meals; Therapy: 26Avj6316 to (Last Rx:38Qku2005)  Requested for: 13Apr2017; Status: ACTIVE   - Transmit to Pharmacy - Awaiting Verification Ordered   3  Creon 22590 UNIT Oral Capsule Delayed Release Particles; TAKE 1 CAPSULE 3 times   daily; Therapy: 32Eui5799 to (Evaluate:34Sgz2858)  Requested for: 24Jan2017; Last   Rx:24Jan2017; Status: ACTIVE - Transmit to Pharmacy - Awaiting Verification Ordered   4  EpiPen 2-Larry 0 3 MG/0 3ML Injection Solution Auto-injector; INJECT 0 3ML   INTRAMUSCULARLY AS DIRECTED  Requested for: 03Agz9160; Last Rx:37Yei9747;   Status: ACTIVE - Transmit to Pharmacy - Awaiting Verification Ordered   5  Fenofibric Acid 135 MG Oral Capsule Delayed Release (Trilipix); TAKE ONE CAPSULE   BY MOUTH EVERY DAY; Therapy: 13JBK3431 to (Evaluate:08Kow0844)  Requested for: 23TKZ1609; Last   Rx:09Mar2017; Status: ACTIVE - Transmit to [facility] Verification Ordered   6  Flaxseed (Linseed) 1000 MG CAPS; TAKE 3 TABLET BY MOUTH DAILY; Therapy: (Wanetta Scale) to Recorded   7  Niacin ER (Antihyperlipidemic) 500 MG Oral Tablet Extended Release; TAKE ONE   TABLET BY MOUTH NIGHTLY AT BEDTIME; Therapy: 78CRA7048 to (Evaluate:32Geh0634)  Requested for: 74FKK8625; Last   Rx:17Jun2017 Ordered   8  Omega-3-acid Ethyl Esters 1 GM Oral Capsule (Lovaza); TAKE 2 CAPSULES BY MOUTH   TWICE DAILY; Therapy: 63Cfl9719 to [phone])  Requested for: 85Gzc4763; Last   Rx:63Idp8532 Ordered   9  Ondansetron HCl - 4 MG Oral Tablet; TAKE 1 TABLET Every 8 hours PRN nausea and   vomiting; Therapy: 34Uni0578 to (Evaluate:31Myi9308)  Requested for: 11Odx8883; Last   Rx:54Mdm6058; Status: ACTIVE - Transmit to [facility] Verification Ordered   10  Protonix 40 MG Oral Packet; take 1 tablet by mouth an 1/2 hour before breakfast daily; Therapy: (Wanetta Scale) to Recorded   11  Rosuvastatin Calcium 20 MG Oral Tablet (Crestor); TAKE 1 TABLET DAILY  Requested    for: 18Apr2017; Last Rx:18Apr2017; Status: ACTIVE - Transmit to Pharmacy - Awaiting    Verification Ordered   12  TraZODone HCl - 50 MG Oral Tablet; TAKE ONE TABLET BY MOUTH NIGHTLY AT    BEDTIME AS NEEDED FOR SLEEP; Therapy: 82JQJ7797 to (Evaluate:84Amv4776)  Requested for: 44QJD6843; Last    Rx:09Mar2017 Ordered   13  Ventolin  (90 Base) MCG/ACT Inhalation Aerosol Solution; INHALE 1 TO 2    PUFFS EVERY 4 TO 6 HOURS AS NEEDED; Therapy: 41XSC1872 to (Last Rx:08Aug2017)  Requested for: 30Lzs3046 Ordered    Allergies    1  No Known Drug Allergies    2   Bee sting    Signatures   Electronically signed by : Tammie Sanchez, ; Aug 18 2017  3:37PM EST                       (Author)

## 2018-01-13 NOTE — MISCELLANEOUS
Assessment    1  Chronic pancreatitis, unspecified pancreatitis type (577 1) (K86 1)   2  Renal mass (593 9) (N28 89)   3  Pancreatic pseudocyst/cyst (577 2) (Z27 9,D09 0)    Plan  Chronic pancreatitis, unspecified pancreatitis type    · Morphine Sulfate ER 15 MG Oral Tablet Extended Release   Rx By: Mono Rawls; Dispense: 10 Days ; #:30 Tablet Extended Release; Refill: 0; For: Chronic pancreatitis, unspecified pancreatitis type; LAVONNE = N; Print Rx   · Morphine Sulfate 15 MG Oral Tablet; TAKE 1 TO 2 TABLETS EVERY 6 HOURS AS  NEEDED FOR PAIN   Rx By: Mono Rawls; Dispense: 3 Days ; #:20 Tablet; Refill: 0; For: Chronic pancreatitis, unspecified pancreatitis type; LAVONNE = N; Print Rx  Renal mass    · Brenda - Jenny MARIN, Ivana Franz  (Urology) Physician Referral  Consult Only: the expectation is  that the referring provider will communicate back to the patient on treatment options  Evaluation and Treatment: the expectation is that the referred to provider will  communicate back to the patient on treatment options  Status: Active  Requested for:  21Mar2017   Ordered; For: Renal mass; Ordered By: Mono Rawls Performed:  Due: 34NFO7688; Last Updated By: Alejandra Gonzalez; 3/21/2017 10:58:28 AM  are Referring to a non- Preferred Provider : Patient refused suggestion for      recommended provider  Care Summary provided  : Yes    Discussion/Summary  Discussion Summary:   Renal mass  will schedule with urology per pt request local provider   chronic pancreatitis  to f/u with Yaw Samaniego in regard to further management   Medication SE Review and Pt Understands Tx: Possible side effects of new medications were reviewed with the patient/guardian today  The treatment plan was reviewed with the patient/guardian  The patient/guardian understands and agrees with the treatment plan      Chief Complaint  Chief Complaint Free Text Note Form: Pt is here for a JELANI      History of Present Illness  TCM Communication [facility]:  The patient is being contacted for follow-up after hospitalization  He was hospitalized at [facility]  The date of admission: 3/2/17, date of discharge: 3/10/17  He was discharged to home  Medications reviewed and updated today  He did not schedule a follow up appointment  Follow-up appointments with other specialists: aron, in three days , urologist not made  Symptoms: upper abdominal pain, but no nausea and no vomiting  Pain is located in the upper quadrants  There is no radiation  The patient describes the pain as dull  The symptoms occur constantly  He describes this as moderate in severity  Exacerbating factors:  eating and magic bullet , smoothy  Relieving factors:  opioid analgesics  Referrals Needed:  urologist , local will not drive out to [place]  Counseling was provided to the patient  Topics counseled included diagnostic results, instructions for management, risk factor reductions and importance of compliance with treatment  Communication performed and completed by   HPI: f/u form hosp initially in [facility] but after they was a mass on my kidney transfer to [place] , because they did not have a urologist in [facility] , they did nothing and told me to f/u on outpt,       Review of Systems  Complete-Male:   Constitutional: feeling poorly  Eyes: No complaints of eye pain, no red eyes, no discharge from eyes, no itchy eyes  ENT: no complaints of earache, no hearing loss, no nosebleeds, no nasal discharge, no sore throat, no hoarseness  Cardiovascular: No complaints of slow heart rate, no fast heart rate, no chest pain, no palpitations, no leg claudication, no lower extremity  Respiratory: No complaints of shortness of breath, no wheezing, no cough, no SOB on exertion, no orthopnea or PND  Gastrointestinal: abdominal pain and persists even after the surgery  Genitourinary: No complaints of dysuria, no incontinence, no hesitancy, no nocturia, no genital lesion, no testicular pain  Musculoskeletal: No complaints of arthralgia, no myalgias, no joint swelling or stiffness, no limb pain or swelling  ROS Reviewed:   ROS reviewed  Active Problems    1  Abnormal LFTs (790 6) (R79 89)   2  Asthma due to seasonal allergies (493 90) (J45 909)   3  Chronic pancreatitis, unspecified pancreatitis type (577 1) (K86 1)   4  Gallbladder sludge (575 8) (K82 8)   5  High triglycerides (272 1) (E78 1)   6  History of allergy (V15 09) (Z88 9)   7  Hyperlipemia, mixed (272 2) (E78 2)   8  Insomnia, persistent (307 42) (G47 00)   9  Lesion of liver (573 8) (K76 9)   10  Liver abscess (572 0) (K75 0)   11  Muscular aches (729 1) (M79 1)   12  Pancreatic pseudocyst/cyst (577 2) (K86 2,K86 3)   13  Postoperative state (V45 89) (Z98 890)    Past Medical History    1  History of hyperlipidemia (V12 29) (Z86 39)   2  History of pancreatitis (V12 79) (Z87 19)   3  History of Tear, meniscus (836 2) (B35 282L)    Surgical History    1  History of Knee Arthroscopy (Therapeutic)   2  History of Shoulder Arthroscopy With Rotator Cuff Repair  Surgical History Reviewed: The surgical history was reviewed and updated today  Family History  Mother    1  Denied: Family history of mental disorder   2  Denied: Family history of Illicit drug use  Grandmother    3  Family history of malignant neoplasm (V16 9) (Z80 9)  Grandfather    4  Family history of cardiac disorder (V17 49) (Z82 49)  Family History Reviewed: The family history was reviewed and updated today  Social History    · Consumes alcohol occasionally (V49 89) (Z78 9)   · Current every day smoker (305 1) (F17 200)   · Daily caffeine consumption   · Full-time employment  Social History Reviewed: The social history was reviewed and updated today  The social history was reviewed and is unchanged  Current Meds   1  Aspirin 325 MG Oral Tablet; TAKE 1 TABLET DAILY; Therapy: (Recorded:65Jdp7250) to Recorded   2   Creon 31575 UNIT Oral Capsule Delayed Release Particles; TAKE 1 CAPSULE 3 times   daily; Therapy: 45Zfl7602 to (Evaluate:68Csj4692)  Requested for: 24Jan2017; Last   Rx:24Jan2017; Status: ACTIVE - Transmit to Pharmacy - Awaiting Verification Ordered   3  EpiPen 2-Larry 0 3 MG/0 3ML Injection Solution Auto-injector; INJECT 0 3ML   INTRAMUSCULARLY AS DIRECTED  Requested for: 51Gjw3818; Last Rx:48Ije3633;   Status: ACTIVE - Transmit to Pharmacy - Awaiting Verification Ordered   4  Fenofibric Acid 135 MG Oral Capsule Delayed Release; TAKE ONE CAPSULE BY   MOUTH EVERY DAY; Therapy: 81PYA0564 to (Evaluate:20Qex5971)  Requested for: 14SHR2161; Last   Rx:09Mar2017; Status: ACTIVE - Transmit to Pharmacy - Awaiting Verification Ordered   5  Flaxseed (Linseed) 1000 MG CAPS; TAKE 3 TABLET BY MOUTH DAILY; Therapy: (Michael Comings) to Recorded   6  Lactinex Oral Tablet Chewable; take 1 tablet by mouth once daily with antibiotics    Requested for: 07Lst2016; Last Rx:25Wiz6909; Status: ACTIVE - Transmit to [facility] Verification Ordered   7  Lovaza 1 GM Oral Capsule; TAKE 4 CAPSULES DAILY; Therapy: (Michael Butt) to Recorded   8  Morphine Sulfate 15 MG CAPS; Therapy: (Salvador Roberson) to Recorded   9  Niacin ER (Antihyperlipidemic) 500 MG Oral Tablet Extended Release; TAKE ONE   TABLET BY MOUTH NIGHTLY AT BEDTIME; Therapy: 45YRU5968 to (Evaluate:05Jun2017)  Requested for: 30QJM4755; Last   Rx:07Mar2017 Ordered   10  Protonix 40 MG Oral Packet; take 1 tablet by mouth an 1/2 hour before breakfast daily; Therapy: (Michael Comings) to Recorded   11  Rosuvastatin Calcium 20 MG Oral Tablet; TAKE 1 TABLET DAILY  Requested for:    65REU0816; Last Rx:04Oct2016 Ordered   12  Temazepam 15 MG Oral Capsule; TAKE 1 CAPSULE AT BEDTIME AS NEEDED FOR    SLEEP; Therapy: (Recorded:39Krh7065) to Recorded   13  TraZODone HCl - 50 MG Oral Tablet; TAKE ONE TABLET BY MOUTH NIGHTLY AT    BEDTIME AS NEEDED FOR SLEEP;     Therapy: 98NAO5999 to (Evaluate:08Jey8670)  Requested for: 53JID6747; Last    Rx:09Mar2017 Ordered   14  Ventolin  (90 Base) MCG/ACT Inhalation Aerosol Solution; INHALE 1 TO 2 PUFFS    EVERY 4 TO 6 HOURS AS NEEDED; Therapy: 71FWZ1661 to (Last Rx:79Zju3381)  Requested for: 23Sfo1506 Ordered  Medication List Reviewed: The medication list was reviewed and updated today  Allergies    1  No Known Drug Allergies    2  Bee sting    Vitals  Signs   Recorded: 21Mar2017 10:01AM   Heart Rate: 66  Systolic: 226  Diastolic: 80  Height: 5 ft 11 5 in  Weight: 188 lb 6 08 oz  BMI Calculated: 25 91  BSA Calculated: 2 06  O2 Saturation: 98    Physical Exam    Constitutional   General appearance: No acute distress, well appearing and well nourished  Eyes   Conjunctiva and lids: No swelling, erythema, or discharge  Pupils and irises: Equal, round and reactive to light  Ears, Nose, Mouth, and Throat   External inspection of ears and nose: Normal     Oropharynx: Normal with no erythema, edema, exudate or lesions  Pulmonary   Respiratory effort: No increased work of breathing or signs of respiratory distress  Auscultation of lungs: Clear to auscultation, equal breath sounds bilaterally, no wheezes, no rales, no rhonci  Cardiovascular   Auscultation of heart: Normal rate and rhythm, normal S1 and S2, without murmurs  Examination of extremities for edema and/or varicosities: Normal     Carotid pulses: Normal     Abdomen   Abdomen: Abnormal   The abdomen was flat  Bowel sounds were normal  Skin findings: no collateral vein dilation  There was mild tenderness in the right upper quadrant and in the left upper quadrant  The abdomen was not firm and not rigid  No rebound tenderness  No guarding  There was a negative Vasquez's sign and negative Rovsing's sign  no masses palpated  The abdomen was normal to percussion  no CVA tenderness   Skin   Skin and subcutaneous tissue: Normal without rashes or lesions           Future Appointments    Date/Time Provider Specialty Site   04/19/2017 09:30 AM MejiaMirtha [facility], [address] Gastro[address] Adult [facility] [place]   03/23/2017 08:00 [facility] [address] [place] [address] [place]   05/18/2017 09:30 AM John Leonardo, [facility]     Signatures   Electronically signed by :  LUANA Vera; Mar 21 2017  6:07PM EST                       (Author)    Electronically signed by : DAINA Temple ; Mar 21 2017  6:47PM EST

## 2018-01-13 NOTE — MISCELLANEOUS
Assessment    1  Pancreatic pseudocyst/cyst (577 2) (K86 2,K86 3)   2  Lesion of liver (573 8) (K76 9)   3  Asthma due to seasonal allergies (493 90) (J45 909)    Plan  Asthma due to seasonal allergies    · Ventolin  (90 Base) MCG/ACT Inhalation Aerosol Solution; INHALE 1 TO 2  PUFFS EVERY 4 TO 6 HOURS AS NEEDED   Rx By: Tracey Wang; Dispense: 0 Days ; #:1 X 18 GM Inhaler; Refill: 2; For: Asthma due to seasonal allergies; LAVONNE = N; Verified Transmission to [facility] # 158; Last Updated By: System, SureScripts; 12/15/2016 10:27:55 AM  Lesion of liver    · (1) CBC/PLT/DIFF; Status:Active; Requested for:66Svf2668;    Perform:[facility]; Due:59Bfb2860;Ordered; For:Lesion of liver; Ordered By:Toro Phillips;   · (1) COMPREHENSIVE METABOLIC PANEL; Status:Active; Requested for:55Xjy3268;    Perform:[facility]; Due:45Hxt6030;Ordered; For:Lesion of liver; Ordered By:Toro Phillips;   · (1) LIPID PANEL, FASTING; Status:Active; Requested for:85Fmu5118;    Perform:[facility]; Due:92Vky0484;Ordered; For:Lesion of liver; Ordered By:Toro Phillips;   · (1) URINALYSIS w URINE C/S REFLEX (will reflex a microscopy if leukocytes, occult  blood, or nitrites are not within normal limits); Status:Active; Requested for:57Ueb0435;    Perform:[facility]; Due:54Gea3676;Ordered; For:Lesion of liver; Ordered By:Toro Phillips;  Lesion of liver, Pancreatic pseudocyst/cyst    · OxyCODONE HCl - 10 MG Oral Tablet; TAKE 1 TABLET Every 6 hours   Rx By: Tracey Wang; Dispense: 8 Days ; #:30 Tablet; Refill: 0; For: Lesion of liver, Pancreatic pseudocyst/cyst; LAVONNE = N; Print Rx    Discussion/Summary  Counseling Documentation With Imm: The patient was counseled regarding diagnostic results, instructions for management, patient and family education  Medication SE Review and Pt Understands Tx: Possible side effects of new medications were reviewed with the patient/guardian today   The treatment plan was reviewed with the patient/guardian  The patient/guardian understands and agrees with the treatment plan   Transitional Care: Continuing care needed for: Recurrent pancreatitis  Chief Complaint  Chief Complaint Free Text Note Form: Pt is here for a JELANI      History of Present Illness  TCM Communication [facility]: The patient is being contacted for follow-up after hospitalization  Hospital records were not available  He was hospitalized at [facility]  The 30 nov -9dec 2016 [facility]  Diagnosis: liver abscess, s/p pancreatitis with stents  He was discharged to home  Medications reviewed and updated today  He scheduled a follow up appointment  Follow-up appointments with other specialists: Infectious disease , MD CORCORAN JAN 2017  Symptoms: fatigue and incisional pain  very sore Counseling was provided to the patient  Topics counseled included instructions for management, risk factor reductions, activities of daily living and importance of compliance with treatment  Communication performed and completed by Dotty Edmond   HPI: f/u [facility]   liver abscess, recurrent pancreatitis      Review of Systems  Complete-Male:   Constitutional: feeling tired, but no fever and no chills  Eyes: No complaints of eye pain, no red eyes, no discharge from eyes, no itchy eyes  ENT: no complaints of earache, no hearing loss, no nosebleeds, no nasal discharge, no sore throat, no hoarseness  Cardiovascular: No complaints of slow heart rate, no fast heart rate, no chest pain, no palpitations, no leg claudication, no lower extremity  Respiratory: No complaints of shortness of breath, no wheezing, no cough, no SOB on exertion, no orthopnea or PND  Gastrointestinal: abdominal pain and hurts to move but much better, but no nausea, no constipation, no diarrhea and no blood in stools     Genitourinary: No complaints of dysuria, no incontinence, no hesitancy, no nocturia, no genital lesion, no testicular pain    Musculoskeletal: No complaints of arthralgia, no myalgias, no joint swelling or stiffness, no limb pain or swelling  Integumentary: skin wound  Neurological: No compliants of headache, no confusion, no convulsions, no numbness or tingling, no dizziness or fainting, no limb weakness, no difficulty walking  Psychiatric: sleep disturbances, but Is not suicidal, no sleep disturbances, no anxiety or depression, no change in personality, no emotional problems  Endocrine: No complaints of proptosis, no hot flashes, no muscle weakness, no erectile dysfunction, no deepening of the voice, no feelings of weakness  Hematologic/Lymphatic: No complaints of swollen glands, no swollen glands in the neck, does not bleed easily, no easy bruising  ROS Reviewed:   ROS reviewed  Active Problems    1  Abnormal LFTs (790 6) (R79 89)   2  Chronic pancreatitis, unspecified pancreatitis type (577 1) (K86 1)   3  High triglycerides (272 1) (E78 1)   4  History of allergy (V15 09) (Z88 9)   5  Hyperlipemia, mixed (272 2) (E78 2)   6  Insomnia, persistent (307 42) (G47 00)   7  Pancreatic pseudocyst/cyst (577 2) (A29 9,L43 8)    Past Medical History    1  History of hyperlipidemia (V12 29) (Z86 39)   2  History of pancreatitis (V12 79) (Z87 19)   3  History of Tear, meniscus (836 2) (O62 247Z)    Surgical History    1  History of Knee Arthroscopy   2  History of Shoulder Arthroscopy With Rotator Cuff Repair  Surgical History Reviewed: The surgical history was reviewed and updated today  Family History  Mother    1  Denied: Family history of mental disorder   2  Denied: Family history of Illicit drug use  Grandmother    3  Family history of malignant neoplasm (V16 9) (Z80 9)  Grandfather    4  Family history of cardiac disorder (V17 49) (Z82 49)  Family History Reviewed: The family history was reviewed and updated today         Social History    · Consumes alcohol occasionally (V49 89) (Z78 9)   · Current every day smoker (305 1) (F17 200)   · Daily caffeine consumption   · Full-time employment  Social History Reviewed: The social history was reviewed and updated today  The social history was reviewed and is unchanged  Current Meds   1  Aspirin 325 MG Oral Tablet; TAKE 1 TABLET DAILY; Therapy: (Recorded:90Ygc7149) to Recorded   2  Creon 34367 UNIT Oral Capsule Delayed Release Particles; TAKE 1 CAPSULE 3 times   daily; Therapy: 36Nij2504 to (Evaluate:29Mar2017)  Requested for: 91Ykx4590; Last   Rx:73Jvq6391; Status: ACTIVE - Transmit to Pharmacy - Awaiting Verification Ordered   3  EpiPen 2-Larry 0 3 MG/0 3ML Injection Solution Auto-injector; INJECT 0 3ML   INTRAMUSCULARLY AS DIRECTED  Requested for: 48Ufy2022; Last Rx:53Mpt9604;   Status: ACTIVE - Transmit to Pharmacy - Awaiting Verification Ordered   4  Fenofibric Acid 135 MG Oral Capsule Delayed Release; TAKE 1 CAPSULE DAILY; Last   Rx:09Bbe9961 Ordered   5  Fenofibric Acid 135 MG Oral Capsule Delayed Release; TAKE 1 CAPSULE DAILY; Therapy: 70UBL2489 to (Del Rosario Pro)  Requested for: 15BEH9526; Last   Rx:91Zsg7904; Status: [address] to Pharmacy - Awaiting Verification Ordered   6  Flaxseed (Linseed) 1000 MG CAPS; TAKE 3 TABLET BY MOUTH DAILY; Therapy: (Dian Andressa) to Recorded   7  Lovaza 1 GM Oral Capsule; TAKE 4 CAPSULES DAILY; Therapy: (Dian Andressa) to Recorded   8  Niacin ER (Antihyperlipidemic) 500 MG Oral Tablet Extended Release; TAKE 1 TABLET AT   BEDTIME  Requested for: 62UTE5929; Last Rx:09Nov2016; Status: ACTIVE - Transmit to   Pharmacy - Awaiting Verification Ordered   9  OxyCODONE HCl TABS; Therapy: (Recorded:50Coo9445) to Recorded   10  Penicillin V Potassium TABS; Therapy: (Recorded:90Hwi6712) to Recorded   11  Protonix 40 MG Oral Packet; take 1 tablet by mouth an 1/2 hour before breakfast daily; Therapy: (Dian Andressa) to Recorded   12   Rosuvastatin Calcium 20 MG Oral Tablet; TAKE 1 TABLET DAILY Requested for:    83SCD8643; Last Rx:13Qmr6354 Ordered   13  Temazepam 15 MG Oral Capsule; TAKE 1 CAPSULE AT BEDTIME AS NEEDED FOR    SLEEP; Therapy: (Pamela Marcum) to Recorded   14  TraZODone HCl - 50 MG Oral Tablet; TAKE 1 TABLET AT BEDTIME AS NEEDED FOR    SLEEP; Therapy: 47UGF4412 to (Jovita Mendosa)  Requested for: 14YYP1837; Last    Rx:13Pcr9874 Ordered   15  TraZODone HCl - 50 MG Oral Tablet; TAKE 1/2 to 1 tab TABLET AT BEDTIME AS NEEDED;    Last Rx:57Jvw7559 Ordered  Medication List Reviewed: The medication list was reviewed and updated today  Allergies    1  No Known Drug Allergies    2  Bee sting    Vitals  Signs   Recorded: 10Hcx9868 09:38AM   Heart Rate: 74  Systolic: 438  Diastolic: 72  Height: 5 ft 11 5 in  Weight: 174 lb   BMI Calculated: 23 93  BSA Calculated: 2  O2 Saturation: 100  Temperature: 98 2 F    Physical Exam    Constitutional   General appearance: No acute distress, well appearing and well nourished  appears tired  Eyes   Pupils and irises: Equal, round and reactive to light  Ears, Nose, Mouth, and Throat   External inspection of ears and nose: Normal     Oropharynx: Normal with no erythema, edema, exudate or lesions  Pulmonary   Respiratory effort: No increased work of breathing or signs of respiratory distress  Auscultation of lungs: Clear to auscultation, equal breath sounds bilaterally, no wheezes, no rales, no rhonci  Cardiovascular   Auscultation of heart: Normal rate and rhythm, normal S1 and S2, without murmurs  Abdomen   Abdomen: Non-tender, no masses  tenderness stent cite right mid abd  Lymphatic   Palpation of lymph nodes in neck: No lymphadenopathy  Musculoskeletal   Gait and station: Normal     Skin wounds well healed     Psychiatric   Orientation to person, place and time: Normal     Mood and affect: Normal          Future Appointments    Date/Time Provider Specialty Site   01/16/2017 09:15 AM Tracey Wang, [facility] [address]     Signatures   Electronically signed by :  LUANA Aguero; Dec 15 2016 12:50PM EST                       (Author)    Electronically signed by : DAINA George ; Dec 15 2016  4:56PM EST

## 2018-01-13 NOTE — MISCELLANEOUS
Assessment    1  Chronic pancreatitis, unspecified pancreatitis type (577 1) (K86 1)   2  Gastritis without bleeding, unspecified chronicity, unspecified gastritis type (535 50)   (K29 70)   3  High triglycerides (272 1) (E78 1)    Discussion/Summary  Discussion Summary:   Gastritis/chronic pancreatitis/high triglycerides  Discussed plan of care with patient, advised to continue present medications to include Protonix Zantac Carafate Creon  Advised to maintain bland diet clear liquids avoidance of acidic foods, no caffeine, no alcohol  Advise patient if discomfort pain persist is to return to the emergency room  At this point patient feels well enough to continue to work, will be doing a run today  Patient to maintain follow-up with GI  Counseling Documentation With Imm: The patient was counseled regarding instructions for management, patient and family education, importance of compliance with treatment  Medication SE Review and Pt Understands Tx: Possible side effects of new medications were reviewed with the patient/guardian today  The treatment plan was reviewed with the patient/guardian  The patient/guardian understands and agrees with the treatment plan      Chief Complaint  Chief Complaint Free Text Note Form: JELANI, Patient states that he is in pain from his gastritis  History of Present Illness  TCM Communication [facility]: The patient is being contacted for follow-up after hospitalization  He was hospitalized at [facility]  The date of admission: 11/15/2017, date of discharge: 11/18/2017  Diagnosis: SEVERE GASTRITIS  He was discharged to home  Medications reviewed and updated today  He scheduled a follow up appointment  Follow-up appointments with other specialists: [facility]     Symptoms: middle abdominal pain, but no fever, no weakness, no dizziness, no headache, no fatigue, no cough, no shortness of breath, no chest pain, no back pain on left side, no back pain on right side, no arm pain left side, no arm pain on right side, no leg pain on left side, no leg pain on right side, no upper abdominal pain, no lower abdominal pain, no rash:, no anorexia, no nausea, no vomiting, no loose stools, no constipation, no pain with urinating, no incisional pain, no wound drainage and no swelling  Counseling was provided to the patient  Communication performed and completed by Nedra Batista   HPI: f/u on the abd pain, when into the hospital on or about 15th of November and discharged on the 18th November,  When into the ER on the 15th for severe epigastric discomfort, states feeling tight upper abdomen  While in the hospital they did do a CT scan and they did do an EGD was told to is severe esophagitis  Prior to going into the hospital denies any change in diet admits to 1 or 2 beers/like beer over the course of a month, also admits to drinking about pot of coffee per day has returned to work and the diet has been more truck like  Prior to going into the hospital had increased the Protonix to twice a day and had been seen by Dr Summer Montanez  At time of discharge was feeling somewhat better, had return to work, and continues to work driving a truck, locally  was in the ER last night , for the same thing, once again denies any change in diet at this point has been taking Zantac twice a day, Protonix twice a day, and was told to start Carafate  Continues to have discomfort in upper abdomen, nonradiating has not had anything to eat, hurts to much to swallow , no vomiting, negative diarrhea constipation, denies issue with asthma no wheezing cough      Review of Systems  Complete-Male:   Constitutional: No fever or chills, feels well, no tiredness, no recent weight gain or weight loss  Eyes: No complaints of eye pain, no red eyes, no discharge from eyes, no itchy eyes  ENT: no complaints of earache, no hearing loss, no nosebleeds, no nasal discharge, no sore throat, no hoarseness     Cardiovascular: No complaints of slow heart rate, no fast heart rate, no chest pain, no palpitations, no leg claudication, no lower extremity  Respiratory: No complaints of shortness of breath, no wheezing, no cough, no SOB on exertion, no orthopnea or PND  Gastrointestinal: abdominal pain, but no nausea, no vomiting, no constipation, no diarrhea and no blood in stools  Genitourinary: No complaints of dysuria, no incontinence, no hesitancy, no nocturia, no genital lesion, no testicular pain  Musculoskeletal: No complaints of arthralgia, no myalgias, no joint swelling or stiffness, no limb pain or swelling  ROS Reviewed:   ROS reviewed  Active Problems    1  Asthma due to seasonal allergies (493 90) (J45 909)   2  Chronic pain syndrome (338 4) (G89 4)   3  Gallbladder sludge (575 8) (K82 8)   4  Gastritis without bleeding, unspecified chronicity, unspecified gastritis type (535 50)   (K29 70)   5  High triglycerides (272 1) (E78 1)   6  History of allergy (V15 09) (Z88 9)   7  Hyperlipemia, mixed (272 2) (E78 2)   8  Insomnia, persistent (307 42) (G47 00)   9  Lesion of liver (573 8) (K76 9)   10  Liver abscess (572 0) (K75 0)   11  Muscular aches (729 1) (M79 1)   12  Nausea and vomiting (787 01) (R11 2)   13  Need for influenza vaccination (V04 81) (Z23)   14  Pancreatic pseudocyst/cyst (577 2) (K86 2,K86 3)   15  Postoperative state (V45 89) (Z98 890)   16  Renal mass (593 9) (N28 89)   17  Urinary retention (788 20) (R33 9)    Past Medical History    1  History of esophageal reflux (V12 79) (Z87 19)   2  History of hyperlipidemia (V12 29) (Z86 39)   3  History of pancreatitis (V12 79) (Z87 19)   4  History of Tear, meniscus (836 2) (Y66 623Y)    Surgical History    1  History of Cholecystectomy Laparoscopic   2  History of Knee Arthroscopy (Therapeutic)   3  History of Shoulder Arthroscopy With Rotator Cuff Repair  Surgical History Reviewed: The surgical history was reviewed and updated today  Family History  Mother    1   Denied: Family history of mental disorder   2  Denied: Family history of Illicit drug use  Grandmother    3  Family history of malignant neoplasm (V16 9) (Z80 9)  Grandfather    4  Family history of cardiac disorder (V17 49) (Z82 49)  Family History Reviewed: The family history was reviewed and updated today  Social History    · History of Consumes alcohol occasionally (V49 89) (Z78 9)   · Current every day smoker (305 1) (F17 200)   · Daily caffeine consumption   · Full-time employment   · History of No alcohol use  Social History Reviewed: The social history was reviewed and updated today  The social history was reviewed and is unchanged  Current Meds   1  Aspirin 325 MG Oral Tablet; TAKE 1 TABLET DAILY; Therapy: (Recorded:47Jhi5794) to Recorded   2  Creon 06836 UNIT Oral Capsule Delayed Release Particles; Take 1 tablet with each   snack between meals; Therapy: 81Nrn9071 to (Last Rx:13Apr2017)  Requested for: 13Apr2017; Status: ACTIVE -   Transmit to Pharmacy - Awaiting Verification Ordered   3  Creon 38884 UNIT Oral Capsule Delayed Release Particles; TAKE 1 CAPSULE 3 times   daily; Therapy: 77Mrx0552 to (Evaluate:13Pvz3868)  Requested for: 24Jan2017; Last   Rx:24Jan2017; Status: ACTIVE - Transmit to Pharmacy - Awaiting Verification Ordered   4  EpiPen 2-Larry 0 3 MG/0 3ML Injection Solution Auto-injector; INJECT 0 3ML   INTRAMUSCULARLY AS DIRECTED  Requested for: 63Zyo5946; Last Rx:15Mhi8511   Ordered   5  Fenofibric Acid 135 MG Oral Capsule Delayed Release; TAKE ONE CAPSULE BY   MOUTH EVERY DAY; Therapy: 96VXZ2852 to (Evaluate:20Mpd1362)  Requested for: 84Vmb3562; Last   Rx:37Don9541 Ordered   6  Flaxseed (Linseed) 1000 MG CAPS; TAKE 3 TABLET BY MOUTH DAILY; Therapy: (Shreya Rubio) to Recorded   7  Niacin ER (Antihyperlipidemic) 500 MG Oral Tablet Extended Release; TAKE ONE   TABLET BY MOUTH NIGHTLY AT BEDTIME;    Therapy: 45HMX3492 to (Yuval Sepulveda)  Requested for: 052 576 88 48; Last Rx: 97Mrc0904 Ordered   8  Omega-3-acid Ethyl Esters 1 GM Oral Capsule; TAKE 2 CAPSULES BY MOUTH TWICE   DAILY; Therapy: 59Hta1380 to (Evaluate:91Qnq8108)  Requested for: 58Ran0392; Last   Rx:49Mvq1416 Ordered   9  Ondansetron HCl - 4 MG Oral Tablet; TAKE 1 TABLET Every 8 hours PRN nausea and   vomiting; Therapy: 97Swb4246 to (Evaluate:51Kcw1363)  Requested for: 58Amu0489; Last   Rx:91Ask1864; Status: ACTIVE - Transmit to [facility] Verification Ordered   10  Pantoprazole Sodium 40 MG Oral Tablet Delayed Release; Take 1 tablet twice daily; Therapy: 59IBK7371 to (Last Rx:44Nar9698)  Requested for: 25JDU4197; Status:    ACTIVE - Transmit to [facility] Verification Ordered   11  Rosuvastatin Calcium 20 MG Oral Tablet; take one tablet by mouth every day; Therapy: 61SXK6274 to 61 60 34)  Requested for: 05LUN2464; Last    Rx:19Lnu5288; Status: ACTIVE - Transmit to [facility] Verification Ordered   12  TraZODone HCl - 50 MG Oral Tablet; TAKE ONE TABLET BY MOUTH NIGHTLY AT    BEDTIME AS NEEDED FOR SLEEP; Therapy: 97JXD3856 to (Evaluate:91Cih1654)  Requested for: 11SCR3986; Last    Rx:09Mar2017 Ordered   13  Ventolin  (90 Base) MCG/ACT Inhalation Aerosol Solution; INHALE 1 TO 2 PUFFS    EVERY 4 TO 6 HOURS AS NEEDED; Therapy: 13YZH7011 to (Last Rx:27Hxo2549)  Requested for: 20Bro4094 Ordered  Medication List Reviewed: The medication list was reviewed and updated today  Allergies    1  No Known Drug Allergies    2  Bee sting    Vitals  Signs   Recorded: 34CXD2080 08:12AM   Temperature: 97 2 F  Heart Rate: 99  Respiration: 17  Systolic: 788  Diastolic: 80  Height: 5 ft 11 in  Weight: 197 lb 2 oz  BMI Calculated: 27 49  BSA Calculated: 2 1  O2 Saturation: 97  Pain Scale: 5-6    Physical Exam    Constitutional   General appearance: No acute distress, well appearing and well nourished  Eyes   Conjunctiva and lids: No swelling, erythema, or discharge      Pupils and irises: Equal, round and reactive to light  Ears, Nose, Mouth, and Throat   External inspection of ears and nose: Normal     Nasal mucosa, septum, and turbinates: Normal without edema or erythema  Oropharynx: Normal with no erythema, edema, exudate or lesions  Pulmonary   Respiratory effort: No increased work of breathing or signs of respiratory distress  Auscultation of lungs: Clear to auscultation, equal breath sounds bilaterally, no wheezes, no rales, no rhonci  Cardiovascular   Auscultation of heart: Normal rate and rhythm, normal S1 and S2, without murmurs  Abdomen   Abdomen: Abnormal   The abdomen was rounded  Bowel sounds were normal  The abdomen was soft and nontender  The abdomen was not firm and not rigid  No rebound tenderness  No guarding  The abdomen was normal to percussion  no CVA tenderness   Lymphatic   Palpation of lymph nodes in neck: No lymphadenopathy  Musculoskeletal   Gait and station: Normal     Digits and nails: Normal without clubbing or cyanosis  Inspection/palpation of joints, bones, and muscles: Normal     Skin   Skin and subcutaneous tissue: Normal without rashes or lesions  Psychiatric   Orientation to person, place and time: Normal     Mood and affect: Normal          Results/Data  PHQ-9 Adult Depression Screening 22DFV3154 08:15AM User, [facility]     Test Name Result Flag Reference   PHQ-9 Adult Depression Score 5     Over the last two weeks, how often have you been bothered by any of the following problems?   Little interest or pleasure in doing things: Not at all - 0  Feeling down, depressed, or hopeless: Not at all - 0  Trouble falling or staying asleep, or sleeping too much: Nearly every day - 3  Feeling tired or having little energy: Not at all - 0  Poor appetite or over eating: More than half the days - 2  Feeling bad about yourself - or that you are a failure or have let yourself or your family down: Not at all - 0  Trouble concentrating on things, such as reading the newspaper or watching television: Not at all - 0  Moving or speaking so slowly that other people could have noticed  Or the opposite -  being so fidgety or restless that you have been moving around a lot more than usual: Not at all - 0  Thoughts that you would be better off dead, or of hurting yourself in some way: Not at all - 0   PHQ-9 Adult Depression Screening Negative     PHQ-9 Difficulty Level Not difficult at all     PHQ-9 Severity Mild Depression         Future Appointments    Date/Time Provider Specialty Site   01/15/2018 08:30 AM Deng Kirkpatrick, [facility]     Signatures   Electronically signed by :  LUANA Licona; Nov 30 2017  9:16AM EST                       (Author)    Electronically signed by : DAINA Silva ; Nov 30 2017  9:48AM EST

## 2018-01-14 VITALS
TEMPERATURE: 97.2 F | SYSTOLIC BLOOD PRESSURE: 120 MMHG | HEART RATE: 99 BPM | OXYGEN SATURATION: 97 % | DIASTOLIC BLOOD PRESSURE: 80 MMHG | RESPIRATION RATE: 17 BRPM | WEIGHT: 197.13 LBS | HEIGHT: 71 IN | BODY MASS INDEX: 27.6 KG/M2

## 2018-01-14 VITALS
DIASTOLIC BLOOD PRESSURE: 90 MMHG | SYSTOLIC BLOOD PRESSURE: 118 MMHG | HEIGHT: 71 IN | HEART RATE: 78 BPM | WEIGHT: 199.13 LBS | BODY MASS INDEX: 27.88 KG/M2

## 2018-01-14 VITALS
DIASTOLIC BLOOD PRESSURE: 80 MMHG | HEART RATE: 84 BPM | HEIGHT: 71 IN | SYSTOLIC BLOOD PRESSURE: 124 MMHG | BODY MASS INDEX: 25.76 KG/M2 | WEIGHT: 184 LBS

## 2018-01-14 VITALS
HEART RATE: 76 BPM | DIASTOLIC BLOOD PRESSURE: 88 MMHG | BODY MASS INDEX: 27.86 KG/M2 | WEIGHT: 199 LBS | HEIGHT: 71 IN | SYSTOLIC BLOOD PRESSURE: 138 MMHG

## 2018-01-14 VITALS
WEIGHT: 184.38 LBS | SYSTOLIC BLOOD PRESSURE: 122 MMHG | BODY MASS INDEX: 25.81 KG/M2 | HEART RATE: 89 BPM | HEIGHT: 71 IN | DIASTOLIC BLOOD PRESSURE: 80 MMHG | OXYGEN SATURATION: 98 %

## 2018-01-14 VITALS
WEIGHT: 196.25 LBS | BODY MASS INDEX: 27.48 KG/M2 | OXYGEN SATURATION: 98 % | HEIGHT: 71 IN | SYSTOLIC BLOOD PRESSURE: 124 MMHG | DIASTOLIC BLOOD PRESSURE: 80 MMHG | HEART RATE: 99 BPM

## 2018-01-14 VITALS
SYSTOLIC BLOOD PRESSURE: 146 MMHG | DIASTOLIC BLOOD PRESSURE: 70 MMHG | TEMPERATURE: 96.9 F | HEART RATE: 69 BPM | WEIGHT: 197 LBS | BODY MASS INDEX: 27.48 KG/M2 | OXYGEN SATURATION: 98 %

## 2018-01-14 VITALS
WEIGHT: 185 LBS | HEIGHT: 72 IN | SYSTOLIC BLOOD PRESSURE: 122 MMHG | TEMPERATURE: 98.2 F | DIASTOLIC BLOOD PRESSURE: 82 MMHG | BODY MASS INDEX: 25.06 KG/M2

## 2018-01-14 NOTE — RESULT NOTES
Message   Recorded as Task   Date: 05/22/2017 10:30 AM, Created By: Stefania Garcia   Task Name: Follow Up   Assigned To: Caprice Zhu   Regarding Patient: Ashley Sanchez, Status: Active   Comment:    Caprice Zhu - 22 May 2017 10:30 AM     TASK CREATED  F/u procedure-Celiac Plexus Sympathetic Injection on 5/9/17-[facility]  Pt reports a 60% improvement in abdominal pain since the injection  Has no pain in the "liver" area, but still has some pancreatic pain that can go to a 6-7/10  Describes this pain as shooting  Has not had to go back to the hospital   Is overall very pleased with results  Has a f/u appoint on 5/26     Deyanira Kapoor - 22 May 2017 10:37 AM     TASK REPLIED TO: Previously Assigned To Aristides Beck MD [facility]        Signatures   Electronically signed by : Jack Dhillon, ; May 22 2017 11:33AM EST                       (Author)

## 2018-01-14 NOTE — RESULT NOTES
Verified Results  (1) LIPID PANEL, FASTING 26Apr2017 12:04PM Bimal Wilde     Test Name Result Flag Reference   CHOLESTEROL, TOTAL 114 mg/dL L 125-200   HDL CHOLESTEROL 28 mg/dL L > OR = 40   TRIGLICERIDES 026 mg/dL H <150   LDL-CHOLESTEROL  mg/dL (calc)  <130   LDL cholesterol not calculated  Triglyceride levels  greater than 400 mg/dL invalidate calculated LDL results  Desirable range <100 mg/dL for patients with CHD or  diabetes and <70 mg/dL for diabetic patients with  known heart disease  CHOL/HDLC RATIO 4 1 (calc)  < OR = 5 0   NON HDL CHOLESTEROL 86 mg/dL (calc)     Target for non-HDL cholesterol is 30 mg/dL higher than   LDL cholesterol target  (1) COMPREHENSIVE METABOLIC PANEL [id number] 50:38LQ Bimal Wilde     Test Name Result Flag Reference   GLUCOSE 100 mg/dL H 65-99   Fasting reference interval     For someone without known diabetes, a glucose value  between 100 and 125 mg/dL is consistent with  prediabetes and should be confirmed with a  follow-up test    UREA NITROGEN (BUN) 12 mg/dL  7-25   CREATININE 0 80 mg/dL  0 60-1 35   eGFR NON-AFR   AMERICAN 109 mL/min/1 73m2  > OR = 60   eGFR AFRICAN AMERICAN 126 mL/min/1 73m2  > OR = 60   BUN/CREATININE RATIO   9-41   NOT APPLICABLE (calc)   SODIUM 140 mmol/L  135-146   POTASSIUM 4 3 mmol/L  3 5-5 3   CHLORIDE 103 mmol/L     CARBON DIOXIDE 26 mmol/L  20-31   CALCIUM 9 9 mg/dL  8 6-10 3   PROTEIN, TOTAL 7 3 g/dL  6 1-8 1   ALBUMIN 5 1 g/dL  3 6-5 1   GLOBULIN 2 2 g/dL (calc)  1 9-3 7   ALBUMIN/GLOBULIN RATIO 2 3 (calc)  1 0-2 5   BILIRUBIN, TOTAL 1 8 mg/dL H 0 2-1 2   ALKALINE PHOSPHATASE 70 U/L     AST 27 U/L  10-40   ALT 24 U/L  9-46     (1) LIPASE 26Apr2017 12:04PM Bimal Wilde   REPORT COMMENT:  FASTING:YES     Test Name Result Flag Reference   LIPASE 15 U/L  7-60

## 2018-01-15 ENCOUNTER — GENERIC CONVERSION - ENCOUNTER (OUTPATIENT)
Dept: OTHER | Facility: OTHER | Age: 43
End: 2018-01-15

## 2018-01-15 VITALS
OXYGEN SATURATION: 98 % | WEIGHT: 184 LBS | DIASTOLIC BLOOD PRESSURE: 74 MMHG | HEIGHT: 72 IN | HEART RATE: 93 BPM | SYSTOLIC BLOOD PRESSURE: 128 MMHG | BODY MASS INDEX: 24.92 KG/M2

## 2018-01-15 NOTE — PROCEDURES
Procedures by Emilie Escobedo MD at 6/1/2017  10:37 AM      Author:  Emilie Escobedo MD Service:  Anesthesiology Author Type:  Physician     Filed:  6/2/2017  4:44 PM Date of Service:  6/1/2017 10:37 AM Status:  Signed     :  Emilie Escobedo MD (Physician)            PROCEDURE: 1) Bilateral Splanchnic nerve block/Celiac Plexus Nerve Block    2) Fluoroscopic needle guidance   DIAGNOSIS: 1) Chronic Abdominal Pain    2) Chronic Pancreatitis   PHYSICIAN: DAINA Herrera    [facility]: 12 mL of bupivacaine 0 25% (6 mL per side) plus 20mg of dexamethasone (10mg per side) in 4cc of sterile saline  LOCAL ANESTHETIC USED: 4 mL of 1% lidocaine  ESTIMATED BLOOD LOSS: None  COMPLICATIONS: None      TECHNIQUE: Time-out was taken to identify the correct patient, procedure and side prior to starting the procedure  With the patient lying prone, the area to be injected was widely prepped and draped in the usual sterile fashion using DuraPrep and a drape  The anatomical target site was determined using fluoroscopy by squaring off the superior endplate of N67, ipsilaterally obliquing the C-arm intensifier until the tip of the T12 transverse process was at the anterolateral border of the T12 vertebral body,  and then moving the C-arm intensifier caudal to move the 12th rib out of the fluoroscopic target view  Local anesthetic was given by raising a skin wheal and going down to the hub of the 27-gauge 1 25-inch needle  A 22-gauge 5-inch Quincke needle was  advanced at the midbody of T12 to a point in the anterior one-third of the T12 vertebral body utilizing A-P and lateral intermittent fluoroscopy  2-3 mL of Omnipaque 240 contrast was injected to show unilateral spread along the anterolateral surface of  the T12 vertebral body, and no vascular uptake   The above technique was then repeated for the opposite side with contrast injected again to show unilateral spread superior and inferior along the anterolateral T12 body  Medication was then injected slowly  in 3 mL increments after negative aspirations of the needle to confirm the needle had not slipped intravascular  The procedure was completed without complications and was tolerated well  The patient was monitored after the procedure  The patient and his  wife was given post-procedure and discharge instructions to follow at home  The patient was discharged in stable condition  A follow-up appointment was made for 4 weeks             Received Eud Araya MD  Jun 2 2017  4:44PM [facility] Standard Time

## 2018-01-16 NOTE — MISCELLANEOUS
Message   Recorded as Task   Date: 07/20/2017 02:02 PM, Created By: Alana Peace   Task Name: Call Back   Assigned To: SPA es clinical,Team   Regarding Patient: Gerardo Grace, Status: Active   Comment:    Tiesha Hilton - 20 Jul 2017 2:02 PM     [address] [address]- Patient called stating he was recently in the hospital 07/18/17 (ER)   stated he then went to his PCP today   stated his blood work was done  & it was found that his Liver levels were up   stated he was instructed not to take any pain meds and will have to go back to have his blood work done again (tomorrow)    PCP wants him to cut done on his dosage   stated  he has been taking Gralise   after taking  med feeling dizzy   patient would like a c/b [phone]   Yumiko Villanueva - 20 Jul 2017 3:23 PM     TASK IN PROGRESS   Yumiko Villanueva - 20 Jul 2017 3:34 PM     TASK EDITED  Pt reports that when he got up to the 1800mg of the Gralise he began feeling dizzy  Lashaun Irons, Mon and Tues he was dizzy, nauseated and vomited so Tues he went to the ER b/c he thought his pancreas was acting up  He was told today by his PCP Dr Gordan Essex that his liver blood work had been 1/3 but was up to 3 8 in the ER  His PCP told him not to take his gralise tonight and then have repeat blood work drawn tomorrow morning  PCP told him that if the blood work comes down he will have have him start the gralise again but only 2 caps at bedtime not 3  Pt said his PCP said he would send a note to Dr Michelle Flynn on how the blood work comes out tomorrow  I told pt I would make Dr Michelle Flnyn aware and asked pt to c/b tomorrow and make us aware what he is advised to do with his Gralise by his PCP based on the results of the blood work  Luis Eduardo Barbosa - 20 Jul 2017 4:27 PM     TASK REPLIED TO: Previously Assigned To Luis Eduardo alvares md aware   Make sure he keeps his appoint for ov f/u   Cheri Fontenot - 21 Jul 2017 8:09 AM     TASK COMPLETED   Janett Reid - 26 Jul 2017 1:12 PM     TASK REACTIVATED   Janett Reid - 26 Jul 2017 1:14 PM     TASK EDITED  phone call from patient stating that since decreasing the dose of Gralise he is still experiencing bouts of dizziness, and it is continuing to get worse  please call patient at [phone]  Mary Bourgeois - 26 Jul 2017 2:10 PM     TASK EDITED  ***FYI***    S/w pt regarding above  Per pt he is taking the 1200mg of gralise at HS, and his "pancreatic pain" is still there  Per pt the dizziness was at it's worst today, he was unable to drive  Per pt he is using the zofran that his pcp prescribed to help with the nausea  RN asked pt if he has reached out again to his PCP to update him  Per pt his PCP is on vacation this week and he will see him on 8/1 for an ov  Per pt someone in the office was supposed to call him if his blood work was "out of Sarthak Enma "    RN advised pt to contact his PCP office today and report his continued symptoms to the covering practitioner  Advised pt that I would be relaying this information to SI and would call back with any further suggestions or recommendations for same  Also advised pt that if his dizziness, pain and nausea continue to get worse that he should return to the ER  Pt verbalized understanding of all information and was appreciative of c/b   ****  please advise any further recs? Dash - 26 Jul 2017 2:25 PM     TASK REPLIED TO: Previously Assigned To Dash  I have performed a Splanchnic NB and he responded very well  I will schedule him for a repeat Splanchnic NB and if he does well will consider the next course of action  I have relayed this information to my MA Camilo Valerio who will cordinate scheduling the procedure at the hospital  Thank you! Active Problems    1  Abnormal LFTs (790 6) (R79 89)   2  Asthma due to seasonal allergies (493 90) (J45 909)   3  Chronic pain (338 29) (G89 29)   4  Chronic pain syndrome (338 4) (G89 4)   5   Chronic pancreatitis, unspecified pancreatitis type (577 1) (K86 1)   6  Gallbladder sludge (575 8) (K82 8)   7  High triglycerides (272 1) (E78 1)   8  History of allergy (V15 09) (Z88 9)   9  Hyperlipemia, mixed (272 2) (E78 2)   10  Insomnia, persistent (307 42) (G47 00)   11  Lesion of liver (573 8) (K76 9)   12  Liver abscess (572 0) (K75 0)   13  Muscular aches (729 1) (M79 1)   14  Nausea and vomiting (787 01) (R11 2)   15  Pancreatic pseudocyst/cyst (577 2) (K86 2,K86 3)   16  Postoperative state (V45 89) (Z98 890)   17  Renal mass (593 9) (N28 89)    Current Meds   1  Aspirin 325 MG Oral Tablet; TAKE 1 TABLET DAILY; Therapy: (Recorded:55Kze5513) to Recorded   2  Creon 30380 UNIT Oral Capsule Delayed Release Particles; Take 1 tablet with each   snack between meals; Therapy: 13Apr2017 to (Last Rx:13Apr2017)  Requested for: 13Apr2017; Status: ACTIVE   - Transmit to Pharmacy - Awaiting Verification Ordered   3  Creon 31418 UNIT Oral Capsule Delayed Release Particles; TAKE 1 CAPSULE 3 times   daily; Therapy: 09Yeo4421 to (Evaluate:12Vsq0005)  Requested for: 24Jan2017; Last   Rx:24Jan2017; Status: ACTIVE - Transmit to Pharmacy - Awaiting Verification Ordered   4  EpiPen 2-Larry 0 3 MG/0 3ML Injection Solution Auto-injector; INJECT 0 3ML   INTRAMUSCULARLY AS DIRECTED  Requested for: 07Nzi4177; Last Rx:51Aeh8929;   Status: ACTIVE - Transmit to Pharmacy - Awaiting Verification Ordered   5  Fenofibric Acid 135 MG Oral Capsule Delayed Release (Trilipix); TAKE ONE CAPSULE   BY MOUTH EVERY DAY; Therapy: 19RUF9890 to (Evaluate:19Wow9090)  Requested for: 80PWG3171; Last   Rx:09Mar2017; Status: ACTIVE - Transmit to [facility] Verification Ordered   6  Flaxseed (Linseed) 1000 MG CAPS; TAKE 3 TABLET BY MOUTH DAILY; Therapy: (Rona Blizzard) to Recorded   7  Gralise 600 MG Oral Tablet; take 3 tablets at bedtime;    Therapy: 81BZG9376 to (Evaluate:62Jdz8735)  Requested for: 25NBA9746; Last   Rx:63Ncl1261; Status: ACTIVE - Transmit to Pharmacy - Awaiting Verification Ordered   8  Niacin ER (Antihyperlipidemic) 500 MG Oral Tablet Extended Release; TAKE ONE   TABLET BY MOUTH NIGHTLY AT BEDTIME; Therapy: 72TFT1245 to (Evaluate:91Jej6239)  Requested for: 31ZUS0651; Last   Rx:67Cwg1526 Ordered   9  Omega-3-acid Ethyl Esters 1 GM Oral Capsule (Lovaza); TAKE 2 CAPSULES BY MOUTH   TWICE DAILY; Therapy: 91Hrh9336 to (03 17 74 [phone] 53)  Requested for: 45Zwg6132; Last   Rx:32Mno1315 Ordered   10  Ondansetron HCl - 4 MG Oral Tablet; TAKE 1 TABLET Every 8 hours PRN nausea and    vomiting; Therapy: 48Sux9536 to (Evaluate:97Gke9779)  Requested for: 21Uvf7151; Last    Rx:92Sxj0731; Status: ACTIVE - Transmit to [facility] Verification Ordered   11  Protonix 40 MG Oral Packet; take 1 tablet by mouth an 1/2 hour before breakfast daily; Therapy: (Renetta Bassett) to Recorded   12  Rosuvastatin Calcium 20 MG Oral Tablet (Crestor); TAKE 1 TABLET DAILY  Requested    for: 17Oco2832; Last Rx:36Naz8474; Status: ACTIVE - Transmit to Pharmacy - Awaiting    Verification Ordered   13  TraZODone HCl - 50 MG Oral Tablet; TAKE ONE TABLET BY MOUTH NIGHTLY AT    BEDTIME AS NEEDED FOR SLEEP; Therapy: 38BNN4811 to (Evaluate:29Hgk7135)  Requested for: 50MFI2973; Last    Rx:09Mar2017 Ordered   14  Ventolin  (90 Base) MCG/ACT Inhalation Aerosol Solution; INHALE 1 TO 2    PUFFS EVERY 4 TO 6 HOURS AS NEEDED; Therapy: 57UED4796 to (Last Rx:80Wah2185)  Requested for: 06Uay8338 Ordered    Allergies    1  No Known Drug Allergies    2   Bee sting    Signatures   Electronically signed by : Rigoberto Brewster RN; Jul 26 2017  3:02PM EST                       (Author)

## 2018-01-16 NOTE — MISCELLANEOUS
Message   Recorded as Task   Date: 11/30/2017 12:27 PM, Created By: Brandyn Ramirez   Task Name: Miscellaneous   Assigned To: SPA  clinical,Team   Regarding Patient: Maye Lemus, Status: Active   Comment:    RussTiffany - 30 Nov 2017 12:27 PM     TASK CREATED  Pt's wife Robert Cerrato called stating pt was in the hospital 3 times for gastritis  She said the ER doctor suggested he have another block and would like to schedule this  Does pt need an office visit first or can this be scheduled? Pt was last seen for an office visit on 9/15/17  Pls call Robert Cerrato at [phone]  Esthela Odor - 30 Nov 2017 12:34 PM     TASK EDITED  Ok to schedule injection? Terry Flatten - 30 Nov 2017 12:55 PM     TASK REPLIED TO: Previously Assigned To Terry Wilkinson  schedule OV with me   Chanel Sanchez - 30 Nov 2017 1:01 PM     TASK REASSIGNED: Previously Assigned To SPA es clinical,Team   Cyndi Merritt - 30 Nov 2017 2:17 PM     TASK REPLIED TO: Previously Assigned To [id number]  schedulded tomorrow morning        Active Problems    1  Abnormal LFTs (790 6) (R79 89)   2  Asthma due to seasonal allergies (493 90) (J45 909)   3  Chronic pain (338 29) (G89 29)   4  Chronic pain syndrome (338 4) (G89 4)   5  Chronic pancreatitis, unspecified pancreatitis type (577 1) (K86 1)   6  Gallbladder sludge (575 8) (K82 8)   7  Gastritis without bleeding, unspecified chronicity, unspecified gastritis type (535 50)   (K29 70)   8  High triglycerides (272 1) (E78 1)   9  History of allergy (V15 09) (Z88 9)   10  Hyperlipemia, mixed (272 2) (E78 2)   11  Insomnia, persistent (307 42) (G47 00)   12  Lesion of liver (573 8) (K76 9)   13  Liver abscess (572 0) (K75 0)   14  Muscular aches (729 1) (M79 1)   15  Nausea and vomiting (787 01) (R11 2)   16  Need for influenza vaccination (V04 81) (Z23)   17  Pancreatic pseudocyst/cyst (577 2) (K86 2,K86 3)   18  Postoperative state (V45 89) (Z98 890)   19   Renal mass (593 9) (N28 89)   20  Urinary retention (788 20) (R33 9)    Current Meds   1  Aspirin 325 MG Oral Tablet; TAKE 1 TABLET DAILY; Therapy: (Recorded:97Sia5082) to Recorded   2  Creon 85916 UNIT Oral Capsule Delayed Release Particles; Take 1 tablet with each   snack between meals; Therapy: 39Gwq2910 to (Last Rx:26Rds7217)  Requested for: 41Blx7537; Status: ACTIVE   - Transmit to Pharmacy - Awaiting Verification Ordered   3  Creon 83788 UNIT Oral Capsule Delayed Release Particles; TAKE 1 CAPSULE 3 times   daily; Therapy: 03Agn9232 to (Evaluate:78Pyk4917)  Requested for: 24Jan2017; Last   Rx:24Jan2017; Status: ACTIVE - Transmit to Pharmacy - Awaiting Verification Ordered   4  EpiPen 2-Larry 0 3 MG/0 3ML Injection Solution Auto-injector; INJECT 0 3ML   INTRAMUSCULARLY AS DIRECTED  Requested for: 41Qth3845; Last Rx:88Thc0444   Ordered   5  Fenofibric Acid 135 MG Oral Capsule Delayed Release (Trilipix); TAKE ONE CAPSULE   BY MOUTH EVERY DAY; Therapy: 50FLZ3996 to (Evaluate:34Bsu1937)  Requested for: 83Gzt2697; Last   Rx:56Gng8227 Ordered   6  Flaxseed (Linseed) 1000 MG CAPS; TAKE 3 TABLET BY MOUTH DAILY; Therapy: (Connor Allison) to Recorded   7  Niacin ER (Antihyperlipidemic) 500 MG Oral Tablet Extended Release; TAKE ONE   TABLET BY MOUTH NIGHTLY AT BEDTIME; Therapy: 60GNZ6311 to (Evaluate:00Yop4004)  Requested for: 89XWR0155; Last   Rx:27Nov2017 Ordered   8  Omega-3-acid Ethyl Esters 1 GM Oral Capsule (Lovaza); TAKE 2 CAPSULES BY MOUTH   TWICE DAILY; Therapy: 47Hfb3078 to (Evaluate:25Ces1686)  Requested for: 96Ygs9728; Last   Rx:59Dap0265 Ordered   9  Ondansetron HCl - 4 MG Oral Tablet; TAKE 1 TABLET Every 8 hours PRN nausea and   vomiting; Therapy: 26Yhc8355 to (Evaluate:29Brf3484)  Requested for: 90Sdk4816; Last   Rx:16Ohf7160; Status: ACTIVE - Transmit to [facility] Verification Ordered   10  Pantoprazole Sodium 40 MG Oral Tablet Delayed Release (Protonix); Take 1 tablet twice    daily;     Therapy: 40VVN7858 to (Last Rx:22Vex8050)  Requested for: 95XBI7950; Status:    ACTIVE - Transmit to [facility] Verification Ordered   11  Rosuvastatin Calcium 20 MG Oral Tablet (Crestor); take one tablet by mouth every day; Therapy: 18MIP6118 to 96 656584)  Requested for: 97XDN9688; Last    Rx:18Oct2017; Status: ACTIVE - Transmit to [facility] Verification Ordered   12  TraZODone HCl - 50 MG Oral Tablet; TAKE ONE TABLET BY MOUTH NIGHTLY AT    BEDTIME AS NEEDED FOR SLEEP; Therapy: 08LAM6754 to (Evaluate:92Zyw6773)  Requested for: 44GAJ9120; Last    Rx:09Mar2017 Ordered   13  Ventolin  (90 Base) MCG/ACT Inhalation Aerosol Solution; INHALE 1 TO 2    PUFFS EVERY 4 TO 6 HOURS AS NEEDED; Therapy: 16GSJ2547 to (Last Rx:77Jwd3689)  Requested for: 93Lje0237 Ordered    Allergies    1  No Known Drug Allergies    2   Bee sting    Signatures   Electronically signed by : Piedad Huntley RN; Nov 30 2017  2:24PM EST                       (Author)

## 2018-01-17 NOTE — RESULT NOTES
Discussion/Summary   Bx of the stomach and esophagus did not have any worrisome findings     Verified Results  (1) TISSUE EXAM [id number] 02:10PM Kvng Morales     Test Name Result Flag Reference   LAB AP CASE REPORT (Report)     Surgical Pathology Report             Case: [id number]                   Authorizing Provider: Chaz Diaz MD      Collected:      11/16/2017 1410        Ordering Location:   [address] [place] [address] Received:      11/16/2017 142                    Operating Room                                 Pathologist:      Marnie Carrillo MD                             Specimens:  A) - Stomach, fundus                                          B) - Esophagus, esophagus   LAB AP FINAL DIAGNOSIS (Report)     A  Stomach, fundus, biopsy:  - Benign oxyntic-type gastric mucosa with mild reactive change  - Negative for gastritis, intestinal metaplasia, dysplasia or carcinoma  - Negative for Helicobacter pylori, confirmed by immunohistochemical   stain, evaluated with appropriate positive controls  B  Esophagus, biopsy:  - Benign squamous mucosa with with mild nonspecific reactive change  - Intraepithelial eosinophils are not increased, negative for features of   eosinophilic esophagitis  - Glandular mucosa is not present, cannot evaluate for intestinal   metaplasia (Soriano's esophagus)  - Negative for dysplasia or carcinoma  Electronically signed by Marnie Carrillo MD on 11/21/2017 at 1:55 PM   LAB AP SURGICAL ADDITIONAL INFORMATION (Report)     All controls performed with the immunohistochemical stains reported above   reacted appropriately  These tests were developed and their performance   characteristics determined by [facility] or   [address] [place] [address]  They may not be cleared or approved by the [place]  Food and Drug Administration  The FDA has determined that such clearance   or approval is not necessary  These tests are used for clinical purposes     They should not be regarded as investigational or for research  This   laboratory has been approved by CLIA 88, designated as a high-complexity   laboratory and is qualified to perform these tests  Interpretation performed at [facility]   LAB AP GROSS DESCRIPTION (Report)     A  The specimen is received in formalin, labeled with the patient's name   and hospital number, and is designated stomach fundus  The specimen   consists of 2 rubbery, tan-brown tissue fragments measuring from 0 2 up to   0 3 cm in greatest dimension  Entirely submitted  One cassette  B  The specimen is received in formalin, labeled with the patient's name   and hospital number, and is designated esophagus  The specimen consists   of two, rubbery, tan-brown tissue fragments each measuring up to 0 2 cm in   greatest dimension  Entirely submitted  One cassette  Note: The estimated total formalin fixation time based upon information   provided by the submitting clinician and the standard processing schedule   is less than 72 hours    SResendez   LAB AP CLINICAL INFORMATION Cold bx  R/o h pylori     Cold bx  R/o h pylori

## 2018-01-17 NOTE — MISCELLANEOUS
Message   Recorded as Task   Date: 06/30/2017 10:56 AM, Created By: Bhakti Baker   Task Name: Miscellaneous   Assigned To: SPA es clinical,Team   Regarding Patient: Moises Wade, Status: Active   Comment:    Ellen Daniel - 30 Jun 2017 10:56 AM     TASK CREATED  pt is at [facility] and he says he refill is not there but it was supposed to been sent  please call pt back at [phone]   Precious Alexander - 30 Jun 2017 1:25 PM     TASK EDITED  *** FYI ***  confirmed gabapentin 300 mg was not received by [facility] pharmacy on 6/27  Rx called in  S/w pt, advised rx was called in, should be available for pu shortly  Pt verbalized understanding  Radha Segunanurag - 30 Jun 2017 1:59 PM     TASK REPLIED TO: Previously Assigned To [facility]        Active Problems    1  Abnormal LFTs (790 6) (R79 89)   2  Asthma due to seasonal allergies (493 90) (J45 909)   3  Chronic pain (338 29) (G89 29)   4  Chronic pain syndrome (338 4) (G89 4)   5  Chronic pancreatitis, unspecified pancreatitis type (577 1) (K86 1)   6  Gallbladder sludge (575 8) (K82 8)   7  High triglycerides (272 1) (E78 1)   8  History of allergy (V15 09) (Z88 9)   9  Hyperlipemia, mixed (272 2) (E78 2)   10  Insomnia, persistent (307 42) (G47 00)   11  Lesion of liver (573 8) (K76 9)   12  Liver abscess (572 0) (K75 0)   13  Muscular aches (729 1) (M79 1)   14  Pancreatic pseudocyst/cyst (577 2) (K86 2,K86 3)   15  Postoperative state (V45 89) (Z98 890)   16  Renal mass (593 9) (N28 89)    Current Meds   1  Aspirin 325 MG Oral Tablet; TAKE 1 TABLET DAILY; Therapy: (Recorded:86Lkq3807) to Recorded   2  Creon 44401 UNIT Oral Capsule Delayed Release Particles; Take 1 tablet with each   snack between meals; Therapy: 90Mrq3677 to (Last Rx:78Erw9218)  Requested for: 56Qho2193; Status: ACTIVE   - Transmit to Pharmacy - Awaiting Verification Ordered   3  Creon 71374 UNIT Oral Capsule Delayed Release Particles; TAKE 1 CAPSULE 3 times   daily;    Therapy: 61Qca7093 to (Evaluate:94Fud6157)  Requested for: 24Jan2017; Last   Rx:24Jan2017; Status: ACTIVE - Transmit to Pharmacy - Awaiting Verification Ordered   4  EpiPen 2-Larry 0 3 MG/0 3ML Injection Solution Auto-injector; INJECT 0 3ML   INTRAMUSCULARLY AS DIRECTED  Requested for: 24Aug2016; Last Rx:60Nrr2716;   Status: ACTIVE - Transmit to Pharmacy - Awaiting Verification Ordered   5  Fenofibric Acid 135 MG Oral Capsule Delayed Release (Trilipix); TAKE ONE CAPSULE   BY MOUTH EVERY DAY; Therapy: 32YEZ8273 to (Evaluate:99Rpl5901)  Requested for: 86VLC9195; Last   Rx:09Mar2017; Status: ACTIVE - Transmit to [facility] Verification Ordered   6  Flaxseed (Linseed) 1000 MG CAPS; TAKE 3 TABLET BY MOUTH DAILY; Therapy: ( [place]) to Recorded   7  Gabapentin 300 MG Oral Capsule; TAKE 1 CAPSULE 3 TIMES DAILY; Therapy: 29INW5521 to (Evaluate:38Ord5831); Last YB:91JQX6691 Ordered   8  Niacin ER (Antihyperlipidemic) 500 MG Oral Tablet Extended Release; TAKE ONE   TABLET BY MOUTH NIGHTLY AT BEDTIME; Therapy: 51XQY3708 to (Evaluate:68Olr2686)  Requested for: 50PPD9493; Last   Rx:17Jun2017 Ordered   9  Omega-3-acid Ethyl Esters 1 GM Oral Capsule (Lovaza); TAKE 2 CAPSULES BY MOUTH   TWICE DAILY; Therapy: 69Aca2838 to [phone])  Requested for: 24Apr2017; Last   Rx:24Apr2017 Ordered   10  Protonix 40 MG Oral Packet; take 1 tablet by mouth an 1/2 hour before breakfast daily; Therapy: ( [place]) to Recorded   11  Rosuvastatin Calcium 20 MG Oral Tablet (Crestor); TAKE 1 TABLET DAILY  Requested    for: 18Apr2017; Last Rx:18Apr2017; Status: ACTIVE - Transmit to Pharmacy - Awaiting    Verification Ordered   12  TraZODone HCl - 50 MG Oral Tablet; TAKE ONE TABLET BY MOUTH NIGHTLY AT    BEDTIME AS NEEDED FOR SLEEP; Therapy: 62YNV8528 to (Evaluate:57Gzp0205)  Requested for: 91QWY4830; Last    Rx:09Mar2017 Ordered   13   Ventolin  (90 Base) MCG/ACT Inhalation Aerosol Solution; INHALE 1 TO 2    PUFFS EVERY 4 TO 6 HOURS AS NEEDED; Therapy: 08MVB1093 to (Last Rx:60Kha7003)  Requested for: 78Txv2066 Ordered    Allergies    1  No Known Drug Allergies    2   Bee sting    Signatures   Electronically signed by : Prabha Molina, ; Jun 30 2017  2:03PM EST                       (Author)

## 2018-01-17 NOTE — RESULT NOTES
Message   Recorded as Task   Date: 07/20/2017 02:02 PM, Created By: Fay Rubio   Task Name: Call Back   Assigned To: SPA es clinical,Team   Regarding Patient: Ksenia Whatley, Status: Active   Comment:    Tiesha Hilton - 20 Jul 2017 2:02 PM     [address]- Patient called stating he was recently in the hospital 07/18/17 (ER)   stated he then went to his PCP today   stated his blood work was done  & it was found that his Liver levels were up   stated he was instructed not to take any pain meds and will have to go back to have his blood work done again (tomorrow)    PCP wants him to cut done on his dosage   stated  he has been taking Gralise   after taking  med feeling dizzy   patient would like a c/b [phone]   Yumiko Villanueva - 20 Jul 2017 3:23 PM     TASK IN PROGRESS   Yumiko Villanueva - 20 Jul 2017 3:34 PM     TASK EDITED  Pt reports that when he got up to the 1800mg of the Gralise he began feeling dizzy  Timothy Lust, Mon and Tues he was dizzy, nauseated and vomited so Tues he went to the ER b/c he thought his pancreas was acting up  He was told today by his PCP Dr Celso Massey that his liver blood work had been 1/3 but was up to 3 8 in the ER  His PCP told him not to take his gralise tonight and then have repeat blood work drawn tomorrow morning  PCP told him that if the blood work comes down he will have have him start the gralise again but only 2 caps at bedtime not 3  Pt said his PCP said he would send a note to Dr Ashley Mccracken on how the blood work comes out tomorrow  I told pt I would make Dr Ashley Mccracken aware and asked pt to c/b tomorrow and make us aware what he is advised to do with his Gralise by his PCP based on the results of the blood work  Namrata Underwood - 20 Jul 2017 4:27 PM     TASK REPLIED TO: Previously Assigned To Namrata alvares md aware   Make sure he keeps his appoint for ov f/u        Signatures   Electronically signed by : Celia Vásquez, ; Jul 21 2017  8:09AM EST (Author)

## 2018-01-18 ENCOUNTER — GENERIC CONVERSION - ENCOUNTER (OUTPATIENT)
Dept: OTHER | Facility: OTHER | Age: 43
End: 2018-01-18

## 2018-01-18 NOTE — MISCELLANEOUS
Message   Recorded as Task   Date: 07/12/2017 01:24 PM, Created By: Diana Harvey   Task Name: Miscellaneous   Assigned To: SPA es clinical,Team   Regarding Patient: Ana De La Garza, Status: In Progress   Comment:    Tiffany Solano - 12 Jul 2017 1:24 PM     TASK CREATED  Pt called stating Dr Thu Muñiz wanted him to let him know how he is doing on new medication prescribed -- Gralise  He said he is not sleepy all day, but the pain is coming back more and more every day  Pt can be reached at [phone]  Yumiko Villanueva - 12 Jul 2017 1:59 PM     TASK IN PROGRESS   Yumiko Villanueva - 12 Jul 2017 2:14 PM     TASK EDITED  S/W pt who states he started the gralise packet on 7/3, and he's taking 2 tabs of the 600mg tabs at dinner  Pt not at home to confirm but that would make today Day 10 of the starter pack  Pt said it's not making him too drowsy but it's not helping his pain yet  Alicia Ort & Mon he spent the entire day in bed, pain was bad he couldn't even get up  The pain he has thought of going to the ER a couple times b/c of the amt of pain he has had  He trying to avoid going back to the hospital  He doesn't even want to move b/c of the pain  Told pt I would give update to Dr Thu Muñiz and our office will c/b with his rec regarding his pain and his gralise  Milbert Slipper - 13 Jul 2017 8:56 AM     TASK REPLIED TO: Previously Assigned To Milbert Slipper  i sent over gralise 600mg po 3 tabs at bedtime  I spoke with patient  He is aware  Gralise has helped with less drowsiness during the day  Lucrecia Meadows - 13 Jul 2017 9:31 AM     TASK IN PROGRESS        Active Problems    1  Abnormal LFTs (790 6) (R79 89)   2  Asthma due to seasonal allergies (493 90) (J45 909)   3  Chronic pain (338 29) (G89 29)   4  Chronic pain syndrome (338 4) (G89 4)   5  Chronic pancreatitis, unspecified pancreatitis type (577 1) (K86 1)   6  Gallbladder sludge (575 8) (K82 8)   7  High triglycerides (272 1) (E78 1)   8   History of allergy (V15 09) (Z88 9)   9  Hyperlipemia, mixed (272 2) (E78 2)   10  Insomnia, persistent (307 42) (G47 00)   11  Lesion of liver (573 8) (K76 9)   12  Liver abscess (572 0) (K75 0)   13  Muscular aches (729 1) (M79 1)   14  Pancreatic pseudocyst/cyst (577 2) (K86 2,K86 3)   15  Postoperative state (V45 89) (Z98 890)   16  Renal mass (593 9) (N28 89)    Current Meds   1  Aspirin 325 MG Oral Tablet; TAKE 1 TABLET DAILY; Therapy: (Recorded:66Dys8410) to Recorded   2  Creon 40109 UNIT Oral Capsule Delayed Release Particles; Take 1 tablet with each   snack between meals; Therapy: 56Tyu9067 to (Last Rx:13Apr2017)  Requested for: 13Apr2017; Status: ACTIVE   - Transmit to Pharmacy - Awaiting Verification Ordered   3  Creon 39057 UNIT Oral Capsule Delayed Release Particles; TAKE 1 CAPSULE 3 times   daily; Therapy: 20Uoc8072 to (Evaluate:93Fkp3150)  Requested for: 24Jan2017; Last   Rx:24Jan2017; Status: ACTIVE - Transmit to Pharmacy - Awaiting Verification Ordered   4  EpiPen 2-Larry 0 3 MG/0 3ML Injection Solution Auto-injector; INJECT 0 3ML   INTRAMUSCULARLY AS DIRECTED  Requested for: 24Aug2016; Last Rx:92Erw0082;   Status: ACTIVE - Transmit to Pharmacy - Awaiting Verification Ordered   5  Fenofibric Acid 135 MG Oral Capsule Delayed Release (Trilipix); TAKE ONE CAPSULE   BY MOUTH EVERY DAY; Therapy: 09USR8710 to (Evaluate:24Pfd6080)  Requested for: 68CVJ0607; Last   Rx:09Mar2017; Status: ACTIVE - Transmit to [facility] Verification Ordered   6  Flaxseed (Linseed) 1000 MG CAPS; TAKE 3 TABLET BY MOUTH DAILY; Therapy: ([place]) to Recorded   7  Gralise 600 MG Oral Tablet; take 3 tablets at bedtime; Therapy: 64WIJ0545 to (Evaluate:70Crm9082)  Requested for: 67XAW7108; Last   Rx:19Tyc8332; Status: ACTIVE - Transmit to [place] Verification Ordered   8  Niacin ER (Antihyperlipidemic) 500 MG Oral Tablet Extended Release; TAKE ONE   TABLET BY MOUTH NIGHTLY AT BEDTIME;    Therapy: 42OZC7921 to (Evaluate:28Psv1430)  Requested for: 01WZI5089; Last   Rx:17Jun2017 Ordered   9  Omega-3-acid Ethyl Esters 1 GM Oral Capsule (Lovaza); TAKE 2 CAPSULES BY MOUTH   TWICE DAILY; Therapy: 26Ruv0123 to ([phone])  Requested for: 24Apr2017; Last   Rx:24Apr2017 Ordered   10  Protonix 40 MG Oral Packet; take 1 tablet by mouth an 1/2 hour before breakfast daily; Therapy: ([place]) to Recorded   11  Rosuvastatin Calcium 20 MG Oral Tablet (Crestor); TAKE 1 TABLET DAILY  Requested    for: 18Apr2017; Last Rx:18Apr2017; Status: ACTIVE - Transmit to Pharmacy - Awaiting    Verification Ordered   12  TraZODone HCl - 50 MG Oral Tablet; TAKE ONE TABLET BY MOUTH NIGHTLY AT    BEDTIME AS NEEDED FOR SLEEP; Therapy: 79NMN7128 to (Evaluate:73Cny7679)  Requested for: 65DGU0212; Last    Rx:09Mar2017 Ordered   13  Ventolin  (90 Base) MCG/ACT Inhalation Aerosol Solution; INHALE 1 TO 2    PUFFS EVERY 4 TO 6 HOURS AS NEEDED; Therapy: 89KBX9952 to (Last Rx:54Ufc9975)  Requested for: 07Xcc8226 Ordered    Allergies    1  No Known Drug Allergies    2  Bee sting    Signatures   Electronically signed by :  Tori Mosquera, ; Jul 13 2017  9:31AM EST                       (Author)

## 2018-01-22 VITALS
BODY MASS INDEX: 27.77 KG/M2 | OXYGEN SATURATION: 98 % | SYSTOLIC BLOOD PRESSURE: 128 MMHG | HEART RATE: 86 BPM | WEIGHT: 198.38 LBS | HEIGHT: 71 IN | DIASTOLIC BLOOD PRESSURE: 74 MMHG | TEMPERATURE: 97.6 F

## 2018-01-22 VITALS
DIASTOLIC BLOOD PRESSURE: 82 MMHG | HEART RATE: 72 BPM | BODY MASS INDEX: 27.44 KG/M2 | WEIGHT: 196 LBS | HEIGHT: 71 IN | SYSTOLIC BLOOD PRESSURE: 126 MMHG

## 2018-01-22 VITALS
DIASTOLIC BLOOD PRESSURE: 80 MMHG | HEART RATE: 66 BPM | OXYGEN SATURATION: 98 % | WEIGHT: 188.38 LBS | BODY MASS INDEX: 25.52 KG/M2 | SYSTOLIC BLOOD PRESSURE: 124 MMHG | HEIGHT: 72 IN

## 2018-01-23 VITALS
HEART RATE: 88 BPM | DIASTOLIC BLOOD PRESSURE: 94 MMHG | SYSTOLIC BLOOD PRESSURE: 126 MMHG | WEIGHT: 193 LBS | TEMPERATURE: 98.2 F | HEIGHT: 71 IN | BODY MASS INDEX: 27.02 KG/M2

## 2018-01-23 NOTE — MISCELLANEOUS
Message   Recorded as Task   Date: 12/28/2017 03:00 PM, Created By: Benjamin Bergeron   Task Name: Follow Up   Assigned To: SPA es procedure,Team   Regarding Patient: Haylie Macedo, Status: In Progress   Comment:    Mariluz Granado - 28 Dec 2017 3:00 PM     TASK CREATED  Call 1/4/18  S/p B Splanchnic nerve block 12/28 at [facility] - 59 Jan 2018 1:44 PM     TASK EDITED  1st attempt LM to COB needing %of relief and pain level  Mariluz Granado - 05 Jan 2018 10:10 AM     TASK EDITED  has 1/18 f/u Doneta Felty - 11 Jan 2018 11:27 AM     TASK EDITED  LMOM on home/cell # for pt to C/B, C/B # provided  Mariluz Granado - 15 Fran 2018 10:54 AM     TASK EDITED  Pt did not return calls but has 1/18 f/u ov        Active Problems    1  Abnormal LFTs (790 6) (R79 89)   2  Asthma due to seasonal allergies (493 90) (J45 909)   3  Chronic pain (338 29) (G89 29)   4  Chronic pain syndrome (338 4) (G89 4)   5  Chronic pancreatitis, unspecified pancreatitis type (577 1) (K86 1)   6  Gallbladder sludge (575 8) (K82 8)   7  Gastritis without bleeding, unspecified chronicity, unspecified gastritis type (535 50)   (K29 70)   8  High triglycerides (272 1) (E78 1)   9  History of allergy (V15 09) (Z88 9)   10  Hyperlipemia, mixed (272 2) (E78 2)   11  Insomnia, persistent (307 42) (G47 00)   12  Lesion of liver (573 8) (K76 9)   13  Liver abscess (572 0) (K75 0)   14  Muscular aches (729 1) (M79 1)   15  Nausea and vomiting (787 01) (R11 2)   16  Need for influenza vaccination (V04 81) (Z23)   17  Pancreatic pseudocyst/cyst (577 2) (K86 2,K86 3)   18  Postoperative state (V45 89) (Z98 890)   19  Renal mass (593 9) (N28 89)   20  Urinary retention (788 20) (R33 9)    Current Meds   1  Aspirin 325 MG Oral Tablet; TAKE 1 TABLET DAILY; Therapy: (Recorded:96Fcr9640) to Recorded   2  Creon 73985 UNIT Oral Capsule Delayed Release Particles; Take 1 tablet with each   snack between meals;    Therapy: 13Apr2017 to (Last Rx:13Apr2017) Requested for: 13Apr2017; Status: ACTIVE   - Transmit to Pharmacy - Awaiting Verification Ordered   3  Creon 00212 UNIT Oral Capsule Delayed Release Particles; TAKE 1 CAPSULE 3 times   daily; Therapy: 97Tjr8683 to (Evaluate:21Nyr7489)  Requested for: 24Jan2017; Last   Rx:24Jan2017; Status: ACTIVE - Transmit to Pharmacy - Awaiting Verification Ordered   4  EpiPen 2-Larry 0 3 MG/0 3ML Injection Solution Auto-injector; INJECT 0 3ML   INTRAMUSCULARLY AS DIRECTED  Requested for: 41Qil9509; Last Rx:42Npt0701   Ordered   5  Fenofibric Acid 135 MG Oral Capsule Delayed Release (Trilipix); TAKE ONE CAPSULE   BY MOUTH EVERY DAY; Therapy: 77ESY3346 to (Evaluate:14Mar2018)  Requested for: 26HYS6237; Last   Rx:93Xpx1779 Ordered   6  Flaxseed (Linseed) 1000 MG CAPS; TAKE 3 TABLET BY MOUTH DAILY; Therapy: ([place]) to Recorded   7  Lorzone TABS; 750 mg tab 1 tab twice daily; Therapy: 41YWE2186 to [phone]) Recorded   8  Niacin ER (Antihyperlipidemic) 500 MG Oral Tablet Extended Release; TAKE ONE   TABLET BY MOUTH AT BEDTIME; Therapy: 22DPM1733 to (Evaluate:13Jan2018)  Requested for: 74QHQ9870; Last   Rx:73Gwa4992 Ordered   9  Omega-3-acid Ethyl Esters 1 GM Oral Capsule (Lovaza); TAKE 2 CAPSULES BY MOUTH   TWICE DAILY; Therapy: 93Dkt7736 to (Evaluate:24Apr2018)  Requested for: 26Oct2017; Last   Rx:65Dly5853 Ordered   10  Ondansetron HCl - 4 MG Oral Tablet; TAKE 1 TABLET Every 8 hours PRN nausea and    vomiting; Therapy: 94Ffi9747 to (Evaluate:62Hcc4150)  Requested for: 19Qff7474; Last    Rx:85Nwi1423; Status: ACTIVE - Transmit to [facility] Verification Ordered   11  Pantoprazole Sodium 40 MG Oral Tablet Delayed Release (Protonix); Take 1 tablet twice    daily; Therapy: 84END6108 to (Last Rx:24Fjk8615)  Requested for: 47BLZ9343; Status:    ACTIVE - Transmit to [place] Verification Ordered   12   Rosuvastatin Calcium 20 MG Oral Tablet (Crestor); take one tablet by mouth every day;    Therapy: 66MUA5553 to (Evaluate:17Mar2018)  Requested for: 72CLR7723; Last    Rx:18Oct2017; Status: [address] genesis Thao Ordered   13  TraMADol HCl - 50 MG Oral Tablet; TAKE 1 TABLET Twice daily for pain; Therapy: 09MOS2416 to (Evaluate:29Dec2017); Last Rx:29Oit4968 Ordered   14  TraMADol HCl - 50 MG Oral Tablet; TAKE 1 TABLET Twice daily for pain; Therapy: 54ISK8674 to (Evaluate:12Jan2018); Last Rx:61Jsp9649 Ordered   15  TraZODone HCl - 50 MG Oral Tablet; TAKE ONE TABLET BY MOUTH NIGHTLY AT    BEDTIME AS NEEDED FOR SLEEP; Therapy: 46DXX0500 to (Evaluate:89Hra0751)  Requested for: 08BLA4778; Last    Rx:09Mar2017 Ordered   16  Ventolin  (90 Base) MCG/ACT Inhalation Aerosol Solution; INHALE 1 TO 2    PUFFS EVERY 4 TO 6 HOURS AS NEEDED; Therapy: 92JIS2743 to (Last Rx:52Xiv9981)  Requested for: 57Vou8705 Ordered    Allergies    1  No Known Drug Allergies    2   Bee sting    Signatures   Electronically signed by : Jessica Greer, ; Fran 15 2018 10:54AM EST                       (Author)

## 2018-01-23 NOTE — MISCELLANEOUS
Message   Recorded as Task   Date: 12/26/2017 12:37 PM, Created By: Sonia Rubio   Task Name: Miscellaneous   Assigned To: SPA es clinical,Team   Regarding Patient: Tracy Patel, Status: Active   Comment:    Sonia Rubio - 26 Dec 2017 12:37 PM     TASK CREATED  Pt's wife called stating that Dr Simone Juarez was supposed to send rx to the pharmacy [facility] - on file) for Lorzone 750 mg  The patient is completly out of his medication  There is a previous task on this stating that the medication was denied but I was not able to reactivate it  Pt is requesting a call back at [phone]  hSeila De Paz - 26 Dec 2017 1:29 PM     TASK EDITED  S/w pt 's wife, as per release of heatl form  Pt 's Jesus Jackson was denied by insurance as per previous task  Pt has none left and is very upset that he will have nothing to take  Should we give patient samples? Would you like to try an alternative medication? Please advise  Xavi Reen - 26 Dec 2017 3:33 PM     TASK REPLIED TO: Previously Assigned To Xavi Rene  We can provide him with samples in the meantime  He should schedule an OV with Sheila Tesfaye - 26 Dec 2017 3:41 PM     TASK EDITED  Called wife to offer samples, she said she spoke to Beny Huff at [facility] and they never received the rx, can you confirm the dosage and quantity and I will call in the Jesus Jackson to his pharmacy again  thanks  Xavi Rene - 26 Dec 2017 4:00 PM     TASK REPLIED TO: Previously Assigned To Xavi Rene  He is on Lorzone 750mg po Q12hrs  I believe it goes through a specialty pharmacy  Double check with Sheila Barnard - 26 Dec 2017 4:16 PM     TASK EDITED  Called in Jesus Jackson to [facility], as they have been filling the rx for the pt since August    Sheila De Paz - 26 Dec 2017 4:28 PM     TASK EDITED  Called pt 's wife to update her that I called in rx to [facility] listed in [employer], also made pt f/u/med refill with [facility] on 1/18 to arrive at 8:15          Active Problems    1  Abnormal LFTs (790 6) (R79 89)   2  Asthma due to seasonal allergies (493 90) (J45 909)   3  Chronic pain (338 29) (G89 29)   4  Chronic pain syndrome (338 4) (G89 4)   5  Chronic pancreatitis, unspecified pancreatitis type (577 1) (K86 1)   6  Gallbladder sludge (575 8) (K82 8)   7  Gastritis without bleeding, unspecified chronicity, unspecified gastritis type (535 50)   (K29 70)   8  High triglycerides (272 1) (E78 1)   9  History of allergy (V15 09) (Z88 9)   10  Hyperlipemia, mixed (272 2) (E78 2)   11  Insomnia, persistent (307 42) (G47 00)   12  Lesion of liver (573 8) (K76 9)   13  Liver abscess (572 0) (K75 0)   14  Muscular aches (729 1) (M79 1)   15  Nausea and vomiting (787 01) (R11 2)   16  Need for influenza vaccination (V04 81) (Z23)   17  Pancreatic pseudocyst/cyst (577 2) (K86 2,K86 3)   18  Postoperative state (V45 89) (Z98 890)   19  Renal mass (593 9) (N28 89)   20  Urinary retention (788 20) (R33 9)    Current Meds   1  Aspirin 325 MG Oral Tablet; TAKE 1 TABLET DAILY; Therapy: (Recorded:24Ums7335) to Recorded   2  Creon 60814 UNIT Oral Capsule Delayed Release Particles; Take 1 tablet with each   snack between meals; Therapy: 75Suk7493 to (Last Rx:74Swv4179)  Requested for: 13Apr2017; Status: ACTIVE   - Transmit to Pharmacy - Awaiting Verification Ordered   3  Creon 71101 UNIT Oral Capsule Delayed Release Particles; TAKE 1 CAPSULE 3 times   daily; Therapy: 63Gsv2078 to (Evaluate:69Ics1534)  Requested for: 24Jan2017; Last   Rx:24Jan2017; Status: ACTIVE - Transmit to Pharmacy - Awaiting Verification Ordered   4  EpiPen 2-Larry 0 3 MG/0 3ML Injection Solution Auto-injector; INJECT 0 3ML   INTRAMUSCULARLY AS DIRECTED  Requested for: 21Aug2017; Last Rx:16Zno4664   Ordered   5  Fenofibric Acid 135 MG Oral Capsule Delayed Release (Trilipix); TAKE ONE CAPSULE   BY MOUTH EVERY DAY; Therapy: 23YCJ1871 to (Evaluate:14Mar2018)  Requested for: 00EUI6130; Last   Rx:97Vkl6099 Ordered   6  Flaxseed (Linseed) 1000 MG CAPS; TAKE 3 TABLET BY MOUTH DAILY; Therapy: ([place]) to Recorded   7  Lorzone TABS; 750 mg tab 1 tab twice daily; Therapy: 72ONJ2674 to () Recorded   8  Niacin ER (Antihyperlipidemic) 500 MG Oral Tablet Extended Release; TAKE ONE   TABLET BY MOUTH AT BEDTIME; Therapy: 68SPF9328 to (Evaluate:13Jan2018)  Requested for: 37QVK7154; Last   Rx:81Uso7564 Ordered   9  Omega-3-acid Ethyl Esters 1 GM Oral Capsule (Lovaza); TAKE 2 CAPSULES BY MOUTH   TWICE DAILY; Therapy: 75Onc7711 to (Evaluate:54Wnm9276)  Requested for: 01Abp8211; Last   Rx:83Eeb4212 Ordered   10  Ondansetron HCl - 4 MG Oral Tablet; TAKE 1 TABLET Every 8 hours PRN nausea and    vomiting; Therapy: 00Elr5671 to (Evaluate:14Amk4766)  Requested for: 68Rwz3120; Last    Rx:10Fbw7822; Status: ACTIVE - Transmit to [facility] Verification Ordered   11  Pantoprazole Sodium 40 MG Oral Tablet Delayed Release (Protonix); Take 1 tablet twice    daily; Therapy: 34INH2094 to (Last Rx:20Wwd9310)  Requested for: 68NCR4289; Status:    ACTIVE - Transmit to [facility] Verification Ordered   12  Rosuvastatin Calcium 20 MG Oral Tablet (Crestor); take one tablet by mouth every day; Therapy: 29HML6231 to [phone])  Requested for: 66YSJ9621; Last    Rx:56Rjs9418; Status: ACTIVE - Transmit to [facility] Verification Ordered   13  TraMADol HCl - 50 MG Oral Tablet; TAKE 1 TABLET Twice daily for pain; Therapy: 47IFX0563 to (Evaluate:32Svx6702); Last Rx:50Mqj0307 Ordered   14  TraZODone HCl - 50 MG Oral Tablet; TAKE ONE TABLET BY MOUTH NIGHTLY AT    BEDTIME AS NEEDED FOR SLEEP; Therapy: 27FSI8290 to (Evaluate:03Xbc7676)  Requested for: 50TJL8214; Last    Rx:09Mar2017 Ordered   15  Ventolin  (90 Base) MCG/ACT Inhalation Aerosol Solution; INHALE 1 TO 2    PUFFS EVERY 4 TO 6 HOURS AS NEEDED;     Therapy: 26ZHI1107 to (Last Rx:51Jwo2542)  Requested for: 73Ohc2914 Ordered    Allergies    1  No Known Drug Allergies    2   Bee sting    Signatures   Electronically signed by : Viry Lopez, ; Dec 26 2017  4:28PM EST                       (Author)

## 2018-01-23 NOTE — MISCELLANEOUS
Message   Recorded as Task   Date: 12/14/2017 12:24 PM, Created By: Estela Herring   Task Name: Miscellaneous   Assigned To: [facility] es clinical,Team   Regarding Patient: Maye Lemus, Status: In Progress   CommentPrem Torres - 14 Dec 2017 12:24 PM     TASK CREATED  Pt called requesting more pain medication  Will be running out  Please call [phone]  Mariluz Granado - 14 Dec 2017 12:35 PM     TASK EDITED  Pt was given 2 week supply of pain medicine at 12/1 ov to get him to splanchnic nerve block but that is not scheduled yet  Do you want to order more pain meds? When do you want to book the OR for this procedure? Pt prefers Thursdays  Terry Wilkinson - 58 Dec 2017 1:15 PM     TASK REPLIED TO: Previously Assigned To Terry Wilkinson  Can he be done on December 28th Thursday  He needs to be seen in the office to get prescription  I can see him on Friday or Anytime next week  Mariluz Granado - 14 Dec 2017 1:23 PM     TASK IN PROGRESS   Mariluz Granado - 14 Dec 2017 1:29 PM     TASK EDITED  Pt will run out of tramadol tomorrow and Pt will be in [place] tomorrow with work  He may be able to come in today by 330/4pm if SI available  Otherwise will be out of tramadol til next week  Pt states he was hoping injection would be scheduled yet and I advised him  is working with OR  Please advise  Terry Wilkinson - 23 Dec 2017 2:04 PM     TASK REPLIED TO: Previously Assigned To Terry Wilkinson  schedule for 315pm if possible F/U MED REFILL with me   Mariluz Granado - 14 Dec 2017 3:02 PM     TASK EDITED  Pt will be in today by 4 to  2 week supply of tramadol  Pt made aware that we are working to schedule splenchnic block in OR  Will call him when scheduled  Mariluz Granado - 14 Dec 2017 3:02 PM     TASK EDITED  Tramadol script needed  Terry Wilkinson - 43 Dec 2017 3:42 PM     TASK REPLIED TO: Previously Assigned To Yu Holland - 14 Dec 2017 3:47 PM     TASK EDITED  Pt picked up script   Pt scheduled for OR procedure 12/28 at 12:45  Pt made aware  Mariluz Granado - 14 Dec 2017 3:47 PM     TASK IN PROGRESS        Active Problems    1  Abnormal LFTs (790 6) (R79 89)   2  Asthma due to seasonal allergies (493 90) (J45 909)   3  Chronic pain (338 29) (G89 29)   4  Chronic pain syndrome (338 4) (G89 4)   5  Chronic pancreatitis, unspecified pancreatitis type (577 1) (K86 1)   6  Gallbladder sludge (575 8) (K82 8)   7  Gastritis without bleeding, unspecified chronicity, unspecified gastritis type (535 50)   (K29 70)   8  High triglycerides (272 1) (E78 1)   9  History of allergy (V15 09) (Z88 9)   10  Hyperlipemia, mixed (272 2) (E78 2)   11  Insomnia, persistent (307 42) (G47 00)   12  Lesion of liver (573 8) (K76 9)   13  Liver abscess (572 0) (K75 0)   14  Muscular aches (729 1) (M79 1)   15  Nausea and vomiting (787 01) (R11 2)   16  Need for influenza vaccination (V04 81) (Z23)   17  Pancreatic pseudocyst/cyst (577 2) (K86 2,K86 3)   18  Postoperative state (V45 89) (Z98 890)   19  Renal mass (593 9) (N28 89)   20  Urinary retention (788 20) (R33 9)    Current Meds   1  Aspirin 325 MG Oral Tablet; TAKE 1 TABLET DAILY; Therapy: (Recorded:39Gqv0585) to Recorded   2  Creon 76030 UNIT Oral Capsule Delayed Release Particles; Take 1 tablet with each   snack between meals; Therapy: 56Skq6895 to (Last Rx:24Xju2186)  Requested for: 13Apr2017; Status: ACTIVE   - Transmit to Pharmacy - Awaiting Verification Ordered   3  Creon 00670 UNIT Oral Capsule Delayed Release Particles; TAKE 1 CAPSULE 3 times   daily; Therapy: 77Tfc2626 to (Evaluate:26Rfs6927)  Requested for: 24Jan2017; Last   Rx:24Jan2017; Status: ACTIVE - Transmit to Pharmacy - Awaiting Verification Ordered   4  EpiPen 2-Larry 0 3 MG/0 3ML Injection Solution Auto-injector; INJECT 0 3ML   INTRAMUSCULARLY AS DIRECTED  Requested for: 71Xhg7586; Last Rx:83Vuq0346   Ordered   5   Fenofibric Acid 135 MG Oral Capsule Delayed Release (Trilipix); TAKE ONE CAPSULE   BY MOUTH EVERY DAY; Therapy: 27ICO5273 to (Evaluate:14Mar2018)  Requested for: 72DBX9381; Last   Rx:07Quq4024 Ordered   6  Flaxseed (Linseed) 1000 MG CAPS; TAKE 3 TABLET BY MOUTH DAILY; Therapy: (Camila Galina) to Recorded   7  Niacin ER (Antihyperlipidemic) 500 MG Oral Tablet Extended Release; TAKE ONE   TABLET BY MOUTH AT BEDTIME; Therapy: 43XXL7613 to (Evaluate:13Jan2018)  Requested for: 02CGH8725; Last   Rx:89Owq8668 Ordered   8  Omega-3-acid Ethyl Esters 1 GM Oral Capsule (Lovaza); TAKE 2 CAPSULES BY MOUTH   TWICE DAILY; Therapy: 81Mfq9836 to (Evaluate:79Wlt6926)  Requested for: 15Nko9141; Last   Rx:92Oyh5032 Ordered   9  Ondansetron HCl - 4 MG Oral Tablet; TAKE 1 TABLET Every 8 hours PRN nausea and   vomiting; Therapy: 48Yyo0965 to (Evaluate:30Dqs2551)  Requested for: 14Var7399; Last   Rx:21Cvg9470; Status: ACTIVE - Transmit to [facility] Verification Ordered   10  Pantoprazole Sodium 40 MG Oral Tablet Delayed Release (Protonix); Take 1 tablet twice    daily; Therapy: 38EAA3970 to (Last Rx:77Hbm2161)  Requested for: 02OUX6336; Status:    ACTIVE - Transmit to [facility] Verification Ordered   11  Rosuvastatin Calcium 20 MG Oral Tablet (Crestor); take one tablet by mouth every day; Therapy: 27EOH3462 to 61 60 34)  Requested for: 50GKD9852; Last    Rx:43Ubh2743; Status: ACTIVE - Transmit to [facility] Verification Ordered   12  TraMADol HCl - 50 MG Oral Tablet; TAKE 1 TABLET Twice daily for pain; Therapy: 40DOL6493 to (Evaluate:83Qxj6151); Last Rx:99Xzz8571 Ordered   13  TraZODone HCl - 50 MG Oral Tablet; TAKE ONE TABLET BY MOUTH NIGHTLY AT    BEDTIME AS NEEDED FOR SLEEP; Therapy: 54IGD1739 to (Evaluate:97Olj2277)  Requested for: 23IUI0727; Last    Rx:09Mar2017 Ordered   14  Ventolin  (90 Base) MCG/ACT Inhalation Aerosol Solution; INHALE 1 TO 2    PUFFS EVERY 4 TO 6 HOURS AS NEEDED;     Therapy: 02YGG9556 to (Last Rx:53Rgu2952) Requested for: 78Naf0264 Ordered    Allergies    1  No Known Drug Allergies    2   Bee sting    Signatures   Electronically signed by : Xavi Short, ; Dec 14 2017  3:47PM EST                       (Author)

## 2018-01-24 VITALS
TEMPERATURE: 98.8 F | WEIGHT: 193 LBS | BODY MASS INDEX: 27.02 KG/M2 | SYSTOLIC BLOOD PRESSURE: 130 MMHG | DIASTOLIC BLOOD PRESSURE: 86 MMHG | HEIGHT: 71 IN | HEART RATE: 92 BPM

## 2018-02-05 ENCOUNTER — TELEPHONE (OUTPATIENT)
Dept: PAIN MEDICINE | Facility: CLINIC | Age: 43
End: 2018-02-05

## 2018-02-05 ENCOUNTER — TELEPHONE (OUTPATIENT)
Dept: OBGYN CLINIC | Facility: HOSPITAL | Age: 43
End: 2018-02-05

## 2018-02-05 NOTE — TELEPHONE ENCOUNTER
Caller: Kristy Navarro, wife  Call back number: [phone]    Patient needs a refill of lorzone 750mg  He takes 1 twice daily  He has 4 pills left   Per Kristy Navarro, they were given samples of this when they were in last

## 2018-02-05 NOTE — TELEPHONE ENCOUNTER
The prescription need to be filled out using an external prescription pad that is in the office and sent to a specialty pharmacy  Please complete and I will sign off on it  Lorzone 750mg po tid    Thanks

## 2018-02-06 NOTE — TELEPHONE ENCOUNTER
S/w pt  States specialty pharmacy does not take his insurance, so it was called in to [facility] time  Advised pt will call in prescription today  Pt states he has enough to last til tomorrow

## 2018-02-06 NOTE — TELEPHONE ENCOUNTER
Called in 30 day supply with 2 refills to [facility] on file  Per pharmacy, may have to order med  S/w pt and offered samples until in stockk  Pt states wife will stop in today

## 2018-02-14 DIAGNOSIS — R11.2 INTRACTABLE VOMITING WITH NAUSEA, UNSPECIFIED VOMITING TYPE: ICD-10-CM

## 2018-02-14 DIAGNOSIS — F50.2 VOMITING ASSOCIATED WITH BULIMIA NERVOSA WITH NAUSEA: Primary | ICD-10-CM

## 2018-02-14 RX ORDER — ONDANSETRON 4 MG/1
4 TABLET, FILM COATED ORAL EVERY 8 HOURS PRN
Qty: 21 TABLET | Refills: 3 | Status: SHIPPED | OUTPATIENT
Start: 2018-02-14 | End: 2018-04-26

## 2018-02-22 DIAGNOSIS — K21.9 GASTROESOPHAGEAL REFLUX DISEASE WITHOUT ESOPHAGITIS: Primary | ICD-10-CM

## 2018-02-22 RX ORDER — PANTOPRAZOLE SODIUM 40 MG/1
1 TABLET, DELAYED RELEASE ORAL 2 TIMES DAILY
COMMUNITY
Start: 2017-11-15 | End: 2018-02-22 | Stop reason: SDUPTHER

## 2018-02-22 RX ORDER — PANTOPRAZOLE SODIUM 40 MG/1
40 TABLET, DELAYED RELEASE ORAL 2 TIMES DAILY
Qty: 60 TABLET | Refills: 0 | Status: SHIPPED | OUTPATIENT
Start: 2018-02-22 | End: 2018-03-08 | Stop reason: SDUPTHER

## 2018-03-08 DIAGNOSIS — E78.49 OTHER HYPERLIPIDEMIA: Primary | ICD-10-CM

## 2018-03-08 DIAGNOSIS — K21.9 GASTROESOPHAGEAL REFLUX DISEASE WITHOUT ESOPHAGITIS: ICD-10-CM

## 2018-03-08 RX ORDER — PANTOPRAZOLE SODIUM 40 MG/1
40 TABLET, DELAYED RELEASE ORAL 2 TIMES DAILY
Qty: 60 TABLET | Refills: 3 | Status: SHIPPED | OUTPATIENT
Start: 2018-03-08 | End: 2018-03-23 | Stop reason: SDUPTHER

## 2018-03-08 RX ORDER — NIACIN 500 MG/1
500 TABLET, EXTENDED RELEASE ORAL
Qty: 30 TABLET | Refills: 3 | Status: SHIPPED | OUTPATIENT
Start: 2018-03-08 | End: 2018-08-20 | Stop reason: SDUPTHER

## 2018-03-14 DIAGNOSIS — E78.2 MIXED HYPERLIPIDEMIA: Primary | ICD-10-CM

## 2018-03-19 ENCOUNTER — TELEPHONE (OUTPATIENT)
Dept: GASTROENTEROLOGY | Facility: CLINIC | Age: 43
End: 2018-03-19

## 2018-03-19 DIAGNOSIS — K86.1 CHRONIC PANCREATITIS, UNSPECIFIED PANCREATITIS TYPE (HCC): Primary | ICD-10-CM

## 2018-03-19 RX ORDER — ROSUVASTATIN CALCIUM 20 MG/1
20 TABLET, COATED ORAL DAILY
Qty: 90 TABLET | Refills: 1 | Status: ON HOLD | OUTPATIENT
Start: 2018-03-19 | End: 2018-06-26

## 2018-03-23 DIAGNOSIS — K21.9 GASTROESOPHAGEAL REFLUX DISEASE WITHOUT ESOPHAGITIS: ICD-10-CM

## 2018-03-23 RX ORDER — PANTOPRAZOLE SODIUM 40 MG/1
40 TABLET, DELAYED RELEASE ORAL 2 TIMES DAILY
Qty: 180 TABLET | Refills: 0 | Status: SHIPPED | OUTPATIENT
Start: 2018-03-23 | End: 2018-06-12 | Stop reason: SDUPTHER

## 2018-03-26 DIAGNOSIS — E78.2 MIXED HYPERLIPIDEMIA: Primary | ICD-10-CM

## 2018-04-06 DIAGNOSIS — E78.2 MIXED HYPERLIPIDEMIA: Primary | ICD-10-CM

## 2018-04-09 RX ORDER — OMEGA-3-ACID ETHYL ESTERS 1 G/1
2 CAPSULE, LIQUID FILLED ORAL 2 TIMES DAILY
Qty: 360 CAPSULE | Refills: 1 | Status: SHIPPED | OUTPATIENT
Start: 2018-04-09 | End: 2018-11-08 | Stop reason: SDUPTHER

## 2018-04-18 ENCOUNTER — APPOINTMENT (EMERGENCY)
Dept: CT IMAGING | Facility: HOSPITAL | Age: 43
DRG: 392 | End: 2018-04-18
Payer: COMMERCIAL

## 2018-04-18 ENCOUNTER — HOSPITAL ENCOUNTER (INPATIENT)
Facility: HOSPITAL | Age: 43
LOS: 2 days | Discharge: HOME | DRG: 392 | End: 2018-04-20
Attending: EMERGENCY MEDICINE | Admitting: FAMILY MEDICINE
Payer: COMMERCIAL

## 2018-04-18 DIAGNOSIS — B17.9 ACUTE HEPATITIS: Primary | ICD-10-CM

## 2018-04-18 DIAGNOSIS — K29.00 ACUTE GASTRITIS WITHOUT HEMORRHAGE: ICD-10-CM

## 2018-04-18 DIAGNOSIS — R10.13 EPIGASTRIC PAIN: ICD-10-CM

## 2018-04-18 PROBLEM — R74.01 TRANSAMINITIS: Status: ACTIVE | Noted: 2018-04-18

## 2018-04-18 PROBLEM — R10.9 ABDOMINAL PAIN: Status: ACTIVE | Noted: 2018-04-18

## 2018-04-18 PROBLEM — D72.819 LEUKOPENIA: Status: ACTIVE | Noted: 2018-04-18

## 2018-04-18 PROBLEM — K86.1 CHRONIC PANCREATITIS (HCC): Status: ACTIVE | Noted: 2018-04-18

## 2018-04-18 LAB
ALBUMIN SERPL BCP-MCNC: 3 G/DL (ref 3.5–5)
ALP SERPL-CCNC: 108 U/L (ref 46–116)
ALT SERPL W P-5'-P-CCNC: 2280 U/L (ref 12–78)
ANION GAP SERPL CALCULATED.3IONS-SCNC: 10 MMOL/L (ref 4–13)
APAP SERPL-MCNC: <2 UG/ML (ref 10–30)
AST SERPL W P-5'-P-CCNC: 2947 U/L (ref 5–45)
BASOPHILS # BLD AUTO: 0.02 THOUSANDS/ΜL (ref 0–0.1)
BASOPHILS NFR BLD AUTO: 1 % (ref 0–1)
BILIRUB DIRECT SERPL-MCNC: 0.22 MG/DL (ref 0–0.2)
BILIRUB SERPL-MCNC: 1.74 MG/DL (ref 0.2–1)
BILIRUB UR QL STRIP: NEGATIVE
BUN SERPL-MCNC: 9 MG/DL (ref 5–25)
CALCIUM SERPL-MCNC: 8.9 MG/DL (ref 8.3–10.1)
CHLORIDE SERPL-SCNC: 103 MMOL/L (ref 100–108)
CK SERPL-CCNC: 126 U/L (ref 39–308)
CLARITY UR: CLEAR
CO2 SERPL-SCNC: 22 MMOL/L (ref 21–32)
COLOR UR: YELLOW
CREAT SERPL-MCNC: 0.66 MG/DL (ref 0.6–1.3)
EOSINOPHIL # BLD AUTO: 0.06 THOUSAND/ΜL (ref 0–0.61)
EOSINOPHIL NFR BLD AUTO: 3 % (ref 0–6)
ERYTHROCYTE [DISTWIDTH] IN BLOOD BY AUTOMATED COUNT: 11.9 % (ref 11.6–15.1)
ETHANOL SERPL-MCNC: <3 MG/DL (ref 0–3)
GFR SERPL CREATININE-BSD FRML MDRD: 117 ML/MIN/1.73SQ M
GLUCOSE SERPL-MCNC: 122 MG/DL (ref 65–140)
GLUCOSE UR STRIP-MCNC: NEGATIVE MG/DL
HCT VFR BLD AUTO: 40.5 % (ref 36.5–49.3)
HGB BLD-MCNC: 14.9 G/DL (ref 12–17)
HGB UR QL STRIP.AUTO: NEGATIVE
KETONES UR STRIP-MCNC: NEGATIVE MG/DL
LEUKOCYTE ESTERASE UR QL STRIP: NEGATIVE
LIPASE SERPL-CCNC: 164 U/L (ref 73–393)
LYMPHOCYTES # BLD AUTO: 0.57 THOUSANDS/ΜL (ref 0.6–4.47)
LYMPHOCYTES NFR BLD AUTO: 26 % (ref 14–44)
MCH RBC QN AUTO: 33.6 PG (ref 26.8–34.3)
MCHC RBC AUTO-ENTMCNC: 36.8 G/DL (ref 31.4–37.4)
MCV RBC AUTO: 91 FL (ref 82–98)
MONOCYTES # BLD AUTO: 0.15 THOUSAND/ΜL (ref 0.17–1.22)
MONOCYTES NFR BLD AUTO: 7 % (ref 4–12)
NEUTROPHILS # BLD AUTO: 1.41 THOUSANDS/ΜL (ref 1.85–7.62)
NEUTS SEG NFR BLD AUTO: 63 % (ref 43–75)
NITRITE UR QL STRIP: NEGATIVE
NRBC BLD AUTO-RTO: 0 /100 WBCS
PH UR STRIP.AUTO: 7.5 [PH] (ref 4.5–8)
PLATELET # BLD AUTO: 153 THOUSANDS/UL (ref 149–390)
PMV BLD AUTO: 10.7 FL (ref 8.9–12.7)
POTASSIUM SERPL-SCNC: 5.9 MMOL/L (ref 3.5–5.3)
PROT SERPL-MCNC: 7.1 G/DL (ref 6.4–8.2)
PROT UR STRIP-MCNC: NEGATIVE MG/DL
RBC # BLD AUTO: 4.43 MILLION/UL (ref 3.88–5.62)
SALICYLATES SERPL-MCNC: <3 MG/DL (ref 3–20)
SODIUM SERPL-SCNC: 135 MMOL/L (ref 136–145)
SP GR UR STRIP.AUTO: 1.01 (ref 1–1.03)
UROBILINOGEN UR QL STRIP.AUTO: 0.2 E.U./DL
WBC # BLD AUTO: 2.22 THOUSAND/UL (ref 4.31–10.16)

## 2018-04-18 PROCEDURE — 80053 COMPREHEN METABOLIC PANEL: CPT | Performed by: EMERGENCY MEDICINE

## 2018-04-18 PROCEDURE — 36415 COLL VENOUS BLD VENIPUNCTURE: CPT | Performed by: EMERGENCY MEDICINE

## 2018-04-18 PROCEDURE — 96361 HYDRATE IV INFUSION ADD-ON: CPT

## 2018-04-18 PROCEDURE — 96365 THER/PROPH/DIAG IV INF INIT: CPT

## 2018-04-18 PROCEDURE — 80320 DRUG SCREEN QUANTALCOHOLS: CPT | Performed by: EMERGENCY MEDICINE

## 2018-04-18 PROCEDURE — 99285 EMERGENCY DEPT VISIT HI MDM: CPT

## 2018-04-18 PROCEDURE — 82550 ASSAY OF CK (CPK): CPT | Performed by: PHYSICIAN ASSISTANT

## 2018-04-18 PROCEDURE — 99254 IP/OBS CNSLTJ NEW/EST MOD 60: CPT | Performed by: INTERNAL MEDICINE

## 2018-04-18 PROCEDURE — G0480 DRUG TEST DEF 1-7 CLASSES: HCPCS | Performed by: EMERGENCY MEDICINE

## 2018-04-18 PROCEDURE — 96366 THER/PROPH/DIAG IV INF ADDON: CPT

## 2018-04-18 PROCEDURE — 96376 TX/PRO/DX INJ SAME DRUG ADON: CPT

## 2018-04-18 PROCEDURE — 96375 TX/PRO/DX INJ NEW DRUG ADDON: CPT

## 2018-04-18 PROCEDURE — 85025 COMPLETE CBC W/AUTO DIFF WBC: CPT | Performed by: EMERGENCY MEDICINE

## 2018-04-18 PROCEDURE — 99222 1ST HOSP IP/OBS MODERATE 55: CPT | Performed by: INTERNAL MEDICINE

## 2018-04-18 PROCEDURE — 81003 URINALYSIS AUTO W/O SCOPE: CPT | Performed by: EMERGENCY MEDICINE

## 2018-04-18 PROCEDURE — 74177 CT ABD & PELVIS W/CONTRAST: CPT

## 2018-04-18 PROCEDURE — 83690 ASSAY OF LIPASE: CPT | Performed by: EMERGENCY MEDICINE

## 2018-04-18 PROCEDURE — 80074 ACUTE HEPATITIS PANEL: CPT | Performed by: EMERGENCY MEDICINE

## 2018-04-18 PROCEDURE — C9113 INJ PANTOPRAZOLE SODIUM, VIA: HCPCS | Performed by: INTERNAL MEDICINE

## 2018-04-18 PROCEDURE — 80329 ANALGESICS NON-OPIOID 1 OR 2: CPT | Performed by: EMERGENCY MEDICINE

## 2018-04-18 PROCEDURE — 82248 BILIRUBIN DIRECT: CPT | Performed by: PHYSICIAN ASSISTANT

## 2018-04-18 PROCEDURE — 96374 THER/PROPH/DIAG INJ IV PUSH: CPT

## 2018-04-18 RX ORDER — ALBUTEROL SULFATE 90 UG/1
1 AEROSOL, METERED RESPIRATORY (INHALATION) EVERY 4 HOURS PRN
Status: DISCONTINUED | OUTPATIENT
Start: 2018-04-18 | End: 2018-04-20 | Stop reason: HOSPADM

## 2018-04-18 RX ORDER — PRAVASTATIN SODIUM 40 MG
40 TABLET ORAL
Status: DISCONTINUED | OUTPATIENT
Start: 2018-04-18 | End: 2018-04-18

## 2018-04-18 RX ORDER — TRAZODONE HYDROCHLORIDE 50 MG/1
50 TABLET ORAL
Status: DISCONTINUED | OUTPATIENT
Start: 2018-04-18 | End: 2018-04-20 | Stop reason: HOSPADM

## 2018-04-18 RX ORDER — NIACIN 500 MG/1
500 TABLET, EXTENDED RELEASE ORAL
Status: DISCONTINUED | OUTPATIENT
Start: 2018-04-19 | End: 2018-04-20 | Stop reason: HOSPADM

## 2018-04-18 RX ORDER — CYCLOBENZAPRINE HCL 10 MG
5 TABLET ORAL
Status: DISCONTINUED | OUTPATIENT
Start: 2018-04-18 | End: 2018-04-20 | Stop reason: HOSPADM

## 2018-04-18 RX ORDER — SUCRALFATE 1 G/1
1 TABLET ORAL
Status: DISCONTINUED | OUTPATIENT
Start: 2018-04-18 | End: 2018-04-18

## 2018-04-18 RX ORDER — OXYCODONE HYDROCHLORIDE 5 MG/1
5 TABLET ORAL EVERY 4 HOURS PRN
Status: DISCONTINUED | OUTPATIENT
Start: 2018-04-18 | End: 2018-04-18

## 2018-04-18 RX ORDER — NICOTINE 21 MG/24HR
1 PATCH, TRANSDERMAL 24 HOURS TRANSDERMAL DAILY
Status: DISCONTINUED | OUTPATIENT
Start: 2018-04-18 | End: 2018-04-20 | Stop reason: HOSPADM

## 2018-04-18 RX ORDER — SODIUM CHLORIDE 9 MG/ML
125 INJECTION, SOLUTION INTRAVENOUS CONTINUOUS
Status: DISCONTINUED | OUTPATIENT
Start: 2018-04-18 | End: 2018-04-18

## 2018-04-18 RX ORDER — ONDANSETRON 2 MG/ML
4 INJECTION INTRAMUSCULAR; INTRAVENOUS ONCE
Status: COMPLETED | OUTPATIENT
Start: 2018-04-18 | End: 2018-04-18

## 2018-04-18 RX ORDER — ONDANSETRON 2 MG/ML
4 INJECTION INTRAMUSCULAR; INTRAVENOUS EVERY 6 HOURS PRN
Status: DISCONTINUED | OUTPATIENT
Start: 2018-04-18 | End: 2018-04-20 | Stop reason: HOSPADM

## 2018-04-18 RX ORDER — SODIUM CHLORIDE, SODIUM GLUCONATE, SODIUM ACETATE, POTASSIUM CHLORIDE, MAGNESIUM CHLORIDE, SODIUM PHOSPHATE, DIBASIC, AND POTASSIUM PHOSPHATE .53; .5; .37; .037; .03; .012; .00082 G/100ML; G/100ML; G/100ML; G/100ML; G/100ML; G/100ML; G/100ML
1000 INJECTION, SOLUTION INTRAVENOUS ONCE
Status: COMPLETED | OUTPATIENT
Start: 2018-04-18 | End: 2018-04-18

## 2018-04-18 RX ORDER — OXYCODONE HYDROCHLORIDE 10 MG/1
10 TABLET ORAL EVERY 4 HOURS PRN
Status: DISCONTINUED | OUTPATIENT
Start: 2018-04-18 | End: 2018-04-20 | Stop reason: HOSPADM

## 2018-04-18 RX ORDER — HEPARIN SODIUM 5000 [USP'U]/ML
5000 INJECTION, SOLUTION INTRAVENOUS; SUBCUTANEOUS EVERY 8 HOURS SCHEDULED
Status: DISCONTINUED | OUTPATIENT
Start: 2018-04-18 | End: 2018-04-20 | Stop reason: HOSPADM

## 2018-04-18 RX ORDER — PANTOPRAZOLE SODIUM 40 MG/1
40 INJECTION, POWDER, FOR SOLUTION INTRAVENOUS EVERY 12 HOURS SCHEDULED
Status: DISCONTINUED | OUTPATIENT
Start: 2018-04-18 | End: 2018-04-20

## 2018-04-18 RX ORDER — CHLORAL HYDRATE 500 MG
1000 CAPSULE ORAL DAILY
Status: DISCONTINUED | OUTPATIENT
Start: 2018-04-18 | End: 2018-04-20 | Stop reason: HOSPADM

## 2018-04-18 RX ORDER — FENOFIBRATE 48 MG/1
48 TABLET, COATED ORAL DAILY
Status: DISCONTINUED | OUTPATIENT
Start: 2018-04-18 | End: 2018-04-18

## 2018-04-18 RX ORDER — SODIUM CHLORIDE 9 MG/ML
150 INJECTION, SOLUTION INTRAVENOUS CONTINUOUS
Status: DISCONTINUED | OUTPATIENT
Start: 2018-04-18 | End: 2018-04-19

## 2018-04-18 RX ORDER — HYDROMORPHONE HCL 110MG/55ML
2 PATIENT CONTROLLED ANALGESIA SYRINGE INTRAVENOUS EVERY 4 HOURS PRN
Status: DISCONTINUED | OUTPATIENT
Start: 2018-04-18 | End: 2018-04-20 | Stop reason: HOSPADM

## 2018-04-18 RX ORDER — ACETAMINOPHEN 325 MG/1
650 TABLET ORAL EVERY 6 HOURS PRN
Status: DISCONTINUED | OUTPATIENT
Start: 2018-04-18 | End: 2018-04-20 | Stop reason: HOSPADM

## 2018-04-18 RX ADMIN — FENOFIBRATE 48 MG: 48 TABLET, FILM COATED ORAL at 12:43

## 2018-04-18 RX ADMIN — OXYCODONE HYDROCHLORIDE 10 MG: 10 TABLET ORAL at 19:07

## 2018-04-18 RX ADMIN — SODIUM CHLORIDE 150 ML/HR: 0.9 INJECTION, SOLUTION INTRAVENOUS at 20:31

## 2018-04-18 RX ADMIN — NICOTINE 1 PATCH: 14 PATCH, EXTENDED RELEASE TRANSDERMAL at 12:42

## 2018-04-18 RX ADMIN — OXYCODONE HYDROCHLORIDE 5 MG: 5 TABLET ORAL at 15:27

## 2018-04-18 RX ADMIN — HYDROMORPHONE HYDROCHLORIDE 2 MG: 2 INJECTION INTRAMUSCULAR; INTRAVENOUS; SUBCUTANEOUS at 21:10

## 2018-04-18 RX ADMIN — SODIUM CHLORIDE, SODIUM GLUCONATE, SODIUM ACETATE, POTASSIUM CHLORIDE, MAGNESIUM CHLORIDE, SODIUM PHOSPHATE, DIBASIC, AND POTASSIUM PHOSPHATE 1000 ML: .53; .5; .37; .037; .03; .012; .00082 INJECTION, SOLUTION INTRAVENOUS at 06:56

## 2018-04-18 RX ADMIN — IOHEXOL 100 ML: 350 INJECTION, SOLUTION INTRAVENOUS at 07:31

## 2018-04-18 RX ADMIN — ONDANSETRON 4 MG: 2 INJECTION INTRAMUSCULAR; INTRAVENOUS at 06:00

## 2018-04-18 RX ADMIN — PANTOPRAZOLE SODIUM 40 MG: 40 INJECTION, POWDER, FOR SOLUTION INTRAVENOUS at 12:43

## 2018-04-18 RX ADMIN — SODIUM CHLORIDE 1000 ML: 0.9 INJECTION, SOLUTION INTRAVENOUS at 06:18

## 2018-04-18 RX ADMIN — OXYCODONE HYDROCHLORIDE 10 MG: 10 TABLET ORAL at 23:32

## 2018-04-18 RX ADMIN — HYDROMORPHONE HYDROCHLORIDE 1 MG: 1 INJECTION, SOLUTION INTRAMUSCULAR; INTRAVENOUS; SUBCUTANEOUS at 06:00

## 2018-04-18 RX ADMIN — HYDROMORPHONE HYDROCHLORIDE 1 MG: 1 INJECTION, SOLUTION INTRAMUSCULAR; INTRAVENOUS; SUBCUTANEOUS at 07:53

## 2018-04-18 RX ADMIN — HYDROMORPHONE HYDROCHLORIDE 1 MG: 1 INJECTION, SOLUTION INTRAMUSCULAR; INTRAVENOUS; SUBCUTANEOUS at 17:27

## 2018-04-18 RX ADMIN — PANTOPRAZOLE SODIUM 40 MG: 40 INJECTION, POWDER, FOR SOLUTION INTRAVENOUS at 21:10

## 2018-04-18 RX ADMIN — ONDANSETRON 4 MG: 2 INJECTION INTRAMUSCULAR; INTRAVENOUS at 14:49

## 2018-04-18 RX ADMIN — HEPARIN SODIUM 5000 UNITS: 5000 INJECTION, SOLUTION INTRAVENOUS; SUBCUTANEOUS at 21:13

## 2018-04-18 RX ADMIN — SODIUM CHLORIDE, SODIUM GLUCONATE, SODIUM ACETATE, POTASSIUM CHLORIDE, MAGNESIUM CHLORIDE, SODIUM PHOSPHATE, DIBASIC, AND POTASSIUM PHOSPHATE 1000 ML: .53; .5; .37; .037; .03; .012; .00082 INJECTION, SOLUTION INTRAVENOUS at 08:38

## 2018-04-18 RX ADMIN — HYDROMORPHONE HYDROCHLORIDE 1 MG: 1 INJECTION, SOLUTION INTRAMUSCULAR; INTRAVENOUS; SUBCUTANEOUS at 07:23

## 2018-04-18 RX ADMIN — Medication 1000 MG: at 12:43

## 2018-04-18 RX ADMIN — SODIUM CHLORIDE 125 ML/HR: 0.9 INJECTION, SOLUTION INTRAVENOUS at 12:43

## 2018-04-18 RX ADMIN — HEPARIN SODIUM 5000 UNITS: 5000 INJECTION, SOLUTION INTRAVENOUS; SUBCUTANEOUS at 14:54

## 2018-04-18 RX ADMIN — HYDROMORPHONE HYDROCHLORIDE 1 MG: 1 INJECTION, SOLUTION INTRAMUSCULAR; INTRAVENOUS; SUBCUTANEOUS at 12:43

## 2018-04-18 RX ADMIN — HYDROMORPHONE HYDROCHLORIDE 1 MG: 1 INJECTION, SOLUTION INTRAMUSCULAR; INTRAVENOUS; SUBCUTANEOUS at 10:30

## 2018-04-18 NOTE — CONSULTS
Consultation - [address] [facility]  Karolhoang Costar 37 y o  male MRN: [id number]  Unit/Bed#: -01 Encounter: [id number]        Consults    Reason for Consult / Principal Problem: Abdominal Pain, Transaminitis    HPI: Mr Simi Dai is a 36 yo M with a PMH of familial hypertriglyceridemia, hx severe pancreatitis a few years ago with persistent pseudocyst requiring cyst gastrostomy further complicated by liver abscess, chronic pancreatitis seen by pain management with recurrent nerve blocks, presenting with acute onset of L sided abdominal pain on Monday associated with nausea and bilious vomiting  He has not had pain like this since having acute on chronic pancreatitis around 1 year ago  He and his wife have been experiencing mild upper respiratory symptoms over the past few weeks  He also notes feeling very fatigued as well as lightheaded/dizzy over the past few weeks  He doesn't know if his blood pressure has been low  He denies any episodes of syncope  He has been having normal BMs without melena or hematochezia  He denies starting any new medications recently  He does take Aleve but not more than 1-2 times daily at most  He denies significant alcohol use  He was taking Arlon Seema recently due to his upper respiratory symptoms  He does admit to having a few beers on Sunday and he does this intermittently despite our recommendations previously to abstain completely from alcohol due to his history of pancreatitis  He continues to smoke  He feels like his reflux symptoms are under control on protonix 40 mg BID and his last EGD was in November 2017 which showed class C esophagitis, prominent folds in the fundus, otherwise normal appearing  He has been on lorzone per his pain management doctor and he recently decreased the dose from TID to BID because he has been fatigued and very drowsy      REVIEW OF SYSTEMS: Negative except for as stated above      Historical Information   Past Medical History: Diagnosis Date    Asthma     Chronic pain disorder     GERD (gastroesophageal reflux disease)     Hyperlipidemia     Liver abscess     Meniscus tear     Pancreatitis      Past Surgical History:   Procedure Laterality Date    CHOLECYSTECTOMY      ESOPHAGOGASTRODUODENOSCOPY N/A 11/16/2017    Procedure: ESOPHAGOGASTRODUODENOSCOPY (EGD); Surgeon: Deirdre Goodman MD;  Location: [facility];   Service: Gastroenterology    KNEE ARTHROSCOPY Bilateral     LIVER SURGERY      PANCREAS SURGERY      stents    AZ INJECT NERV BLCK,CELIAC PLEXUS Bilateral 5/9/2017    Procedure: CELIAC PLEXUS BLOCK ;  Surgeon: Mack Jones MD;  Location: [facility];  Service: Pain Management     AZ INJECT NERV BLCK,CELIAC PLEXUS Bilateral 6/1/2017    Procedure: SPLANCHNIC NERVE BLOCK at T12;  Surgeon: Mack Jones MD;  [facility];  Service: Pain Management     AZ INJECT NERV BLCK,CELIAC PLEXUS Bilateral 8/8/2017    Procedure: BILATERAL SPLANCHNIC NERVE BLOCK T12;  Surgeon: Mack Jones MD;  [facility];  Service: Pain Management     AZ INJECT NERV Di Signs Bilateral 12/28/2017    Procedure: SPLANCHNIC NERVE BLOCK;  Surgeon: Mack Jones MD;  [facility];  Service: Pain Management     AZ LAP,CHOLECYSTECTOMY N/A 2/14/2017    Procedure: LAPAROSCOPIC CHOLECYSTECTOMY, IOC, POSSIBLE OPEN ;  Surgeon: Lennox Hillock, MD;  Location: [facility];  Service: General    ROTATOR CUFF REPAIR Right     SHOULDER ARTHROSCOPY      SHOULDER ARTHROSCOPY Right      Social History   History   Alcohol Use    Yes     Comment: rarely     History   Drug Use No     History   Smoking Status    Current Every Day Smoker    Packs/day: 0 50    Years: 20 00   Smokeless Tobacco    Never Used     Family History   Problem Relation Age of Onset    Cirrhosis Mother        Meds/Allergies     Prescriptions Prior to Admission   Medication    albuterol (PROVENTIL HFA,VENTOLIN HFA) 90 mcg/act inhaler    Chlorzoxazone (LORZONE) 750 MG TABS    choline fenofibrate (TRILIPIX) 135 MG capsule    FLAXSEED, LINSEED, PO    niacin (NIASPAN) 500 mg CR tablet    omega-3-acid ethyl esters (LOVAZA) 1 g capsule    ondansetron (ZOFRAN) 4 mg tablet    pancrelipase, Lip-Prot-Amyl, (CREON) 12,000 units capsule    Pancrelipase, Lip-Prot-Amyl, (CREON) 31431 units CPEP    rosuvastatin (CRESTOR) 20 MG tablet    traZODone (DESYREL) 50 mg tablet    EPINEPHrine (EPIPEN 2-ARIEL) 0 3 mg/0 3 mL SOAJ    ondansetron (ZOFRAN-ODT) 4 mg disintegrating tablet    pantoprazole (PROTONIX) 40 mg tablet    pantoprazole (PROTONIX) 40 mg     Current Facility-Administered Medications   Medication Dose Route Frequency    acetaminophen (TYLENOL) tablet 650 mg  650 mg Oral Q6H PRN    albuterol (PROVENTIL HFA,VENTOLIN HFA) inhaler 1 puff  1 puff Inhalation Q4H PRN    cyclobenzaprine (FLEXERIL) tablet 5 mg  5 mg Oral HS    fenofibrate (TRICOR) tablet 48 mg  48 mg Oral Daily    fish oil capsule 1,000 mg  1,000 mg Oral Daily    heparin (porcine) subcutaneous injection 5,000 Units  5,000 Units Subcutaneous Q8H [address]    HYDROmorphone (DILAUDID) injection 1 mg  1 mg Intravenous Q4H PRN    [START ON 4/19/2018] niacin (NIASPAN) CR tablet 500 mg  500 mg Oral Daily With Breakfast    nicotine (NICODERM CQ) 14 mg/24hr TD 24 hr patch 1 patch  1 patch Transdermal Daily    ondansetron (ZOFRAN) injection 4 mg  4 mg Intravenous Q6H PRN    oxyCODONE (ROXICODONE) IR tablet 5 mg  5 mg Oral Q4H PRN    pantoprazole (PROTONIX) injection 40 mg  40 mg Intravenous Q12H CARRIE    pravastatin (PRAVACHOL) tablet 40 mg  40 mg Oral Daily With Dinner    sodium chloride 0 9 % infusion  125 mL/hr Intravenous Continuous    traZODone (DESYREL) tablet 50 mg  50 mg Oral HS     Facility-Administered Medications Ordered in Other Encounters   Medication Dose Route Frequency    oxyCODONE-acetaminophen (PERCOCET) 5-325 mg per tablet 2 tablet  2 tablet Oral Once       Allergies   Allergen Reactions    Bee Venom Swelling           Objective     Blood pressure 150/89, pulse 60, temperature 98 4 °F (36 9 °C), temperature source Oral, resp  rate 17, height 5' 11" (1 803 m), weight 87 9 kg (193 lb 12 6 oz), SpO2 97 %  Intake/Output Summary (Last 24 hours) at 04/18/18 1545  Last data filed at 04/18/18 1400   Gross per 24 hour   Intake          3160 42 ml   Output                0 ml   Net          3160 42 ml         PHYSICAL EXAM:      General Appearance:   Alert, oriented x3, no acute distress   HENT[de-identified]  Eyes:   Normocephalic, atraumatic   (+) Mild scleral icterus   Neck:  Supple, symmetrical, trachea midline   Lungs:   Clear to auscultation bilaterally, no respiratory distress   Heart[de-identified]   RRR, no murmur   Abdomen:   Non-distended, soft, BS active, (+) TTP in the epigastric and LUQ without guarding   Rectal:   Deferred    Extremities:  No cyanosis or LE edema    Pulses:  2+ and symmetric all extremities    Skin:  No jaundice or pallor     Lab Results:     Results from last 7 days  Lab Units 04/18/18  0549   WBC Thousand/uL 2 22*   HEMOGLOBIN g/dL 14 9   HEMATOCRIT % 40 5   PLATELETS Thousands/uL 153   NEUTROS PCT % 63   LYMPHS PCT % 26   MONOS PCT % 7   EOS PCT % 3       Results from last 7 days  Lab Units 04/18/18  0549   SODIUM mmol/L 135*   POTASSIUM mmol/L 5 9*   CHLORIDE mmol/L 103   CO2 mmol/L 22   BUN mg/dL 9   CREATININE mg/dL 0 66   CALCIUM mg/dL 8 9   TOTAL PROTEIN g/dL 7 1   BILIRUBIN TOTAL mg/dL 1 74*   ALK PHOS U/L 108   ALT U/L 2,280*   AST U/L 2,947*   GLUCOSE RANDOM mg/dL 122           Results from last 7 days  Lab Units 04/18/18  0549   LIPASE u/L 164       Imaging Studies: I have personally reviewed pertinent imaging studies  Ct Abdomen Pelvis With Contrast  Result Date: 4/18/2018  Impression: Severe chronic gastritis with mild active inflammation of the gastric cardia and GE junction  Adjacent shotty reactive lymph nodes are present    No emphysematous gastritis, free gas, or abscess  Prominent soft tissue tract extending from the medial inferior gastric cardia inferiorly towards the posterior superior distal pancreatic body as seen on images 79 through 81 series 602  This is in the area of prior severe active inflammation and attributed to scarring  No fluid or gaseous fistulous tract  Stable hepatomegaly  Trace new multifocal patchy groundglass opacities right middle lobe and right lung base attributed to scarring  The study was marked in [facility] for immediate notification          ASSESSMENT and PLAN:    Principal Problem:    Abdominal pain  Active Problems:    Smoker    Acute gastritis without hemorrhage    Transaminitis    Chronic pancreatitis (HCC)    Leukopenia    Transaminitis  Hyperbilirubinemia  - Significant transaminitis on admission of AST 2,946/ALT 2,280 with a Tbili of 1 74  - Suspect this may be ischemic hepatitis due to report of feeling lightheaded, dizzy, unable to stand up at times v acute viral hepatitis v hepatotoxicity from lorzone for pain management v component of alcoholic hepatitis  - Tylenol, salicylate, and med alcohol level neg on admission  - Pt reports he had a few beers on Sunday but otherwise denies significant binge  - Vitals have been stable since admission with no episodes of hypotension  - CT on admission shows hepatomegaly, no hepatic lesions, no biliary dilatation, s/p cholecystectomy  - Check direct bilirubin  - Check CMP, INR, acute hep panel in AM  - Hold lorzone and hepatotoxic medications  - Hold statin and fenofibrate for now; these can be restarted if LFTs are coming down  - If LFTs do not improve, may need to get an MRI/MRCP to evaluate for microlithiasis or sludge though this seems mostly hepatocellular  - Pt again educated on important of abstinence from alcohol, even occasional intake      Upper Abdominal Pain  Chronic Pancreatitis  - CT on admission shows 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; severe gastritis up to the GE junction, slightly prominent soft tissue from the gastric cardia towards the distal pancreatic body of unclear etiology and could represent scarring from prior pancreatitis  - Depending on his symptoms and LFTs, MRI/MRCP may be of benefit for further clarification if any biliary disease and to look at pancreas for any evidence of acute inflammation  - Lipase normal on admission  - Due to significant pain, keep NPO for bowel and pancreas rest  - IVF hydration, antiemetics and pain control PRN      Chronic Gastritis/Esophagitis  Abnormal CT scan  - He feels his chronic reflux is controlled on protonix 40 mg BID but due to chronic findings of severe gastritis and thickening at the GE junction with lymphadenopathy, he should have a repeat EGD  - He is also using NSAIDs for knee pain and was taking matilda seltzer recently  - Tentative EGD tomorrow to evaluate for PUD and rebiopsy for Soriano's  - If not significant findings, an EUS should be considered to evaluate the persistent LAD which was noted on his CT on November as well      Patient will be seen and examined by Dr Pillo Spain  All rod medical decisions were made by Dr Pillo Spain  Thank you for allowing us to participate in the care of this present patient  We will follow with you closely

## 2018-04-18 NOTE — H&P
H&P- Cinda Alexis 1975, 37 y o  male MRN: [id number]    Unit/Bed#: -01 Encounter: [id number]    Primary Care Provider: Honey Tee MD   Date and time admitted to hospital: 4/18/2018  5:21 AM        * Abdominal pain   Assessment & Plan    Differentials gastritis versus esophagitis versus infectious etiology/pancreatitis/acute hepatitis    CT of the abdomen showed Severe chronic gastritis with mild active inflammation of the gastric cardia and GE junction     -Protonix IV b i d  and sucralfate  GI consult  NPO after midnight For possible EGD tomorrow  Clear liquid diet, advance as tolerated            Leukopenia   Assessment & Plan    White count-2 22 on admission  Patient had no up history of leukopenia in the past   No evidence of active infection  Continue to monitor CBC, temps        Chronic pancreatitis [facility])   Assessment & Plan    Patient had multiple episodes of acute pancreatitis in the past   Currently lipase level is normal and CT scan did show any evidence of acute inflammation  Continue Creon replacement  Monitor clinically        Transaminitis   Assessment & Plan    Patient had elevated transaminases  Tylenol level negative  Check acute hepatitis panel  Follow-up with GI  Trend liver enzymes  Smoker   Assessment & Plan    Nicotine patch  Counseled on smoking cessation          VTE Prophylaxis: Heparin  / sequential compression device   Code Status: level 1  POLST: There is no POLST form on file for this patient (pre-hospital)  Discussion with family:     Anticipated Length of Stay:  Patient will be admitted on an Inpatient basis with an anticipated length of stay of greater 2 midnights  Justification for Hospital Stay:  Abdominal pain evaluation    Total Time for Visit, including Counseling / Coordination of Care: 20 minutes  Greater than 50% of this total time spent on direct patient counseling and coordination of care      Chief Complaint:  Abdominal pain    History of Present Illness:    Lashanda Pal is a 37 y o  male who presents with abdominal pain  Patient has prior past medical history of gastritis, chronic pancreatitis believed likely from elevated triglycerides status post cholecystectomy  Patient also has history of chronic abdominal pain for which he gets celiac pleuxes block follows with pain management  Abdominalpain started 2 days ago was getting worse, radiating to the back, associated with nausea and multiple episodes of emesis  Denies any hematemesis, melena, black stools, diarrhea  No sick contacts  Denies any fevers or chills  Is a chronic smoker, also admits to have taken a few pills of Aleve every day for knee pain  His recent alcohol use was 1 week ago(a couple beers)  Patient had EGD 6 months ago which showed evidence of esophagitis, was placed on Protonix and sucralfate  Patient reports taking those medications regularly  Review of Systems:    Review of Systems   Constitutional: Positive for activity change and fatigue  Negative for appetite change and chills  Respiratory: Negative for cough and shortness of breath  Cardiovascular: Negative for chest pain  Gastrointestinal: Positive for abdominal pain, nausea and vomiting  Negative for blood in stool, constipation and diarrhea  Genitourinary: Negative for dysuria  Musculoskeletal: Positive for back pain  Skin: Negative for rash  Neurological: Negative for dizziness  All other systems reviewed and are negative  Past Medical and Surgical History:     Past Medical History:   Diagnosis Date    Asthma     Chronic pain disorder     GERD (gastroesophageal reflux disease)     Hyperlipidemia     Liver abscess     Meniscus tear     Pancreatitis        Past Surgical History:   Procedure Laterality Date    CHOLECYSTECTOMY      ESOPHAGOGASTRODUODENOSCOPY N/A 11/16/2017    Procedure: ESOPHAGOGASTRODUODENOSCOPY (EGD); Surgeon: Celina Saenz MD;  Location: [facility];   Service: Gastroenterology    KNEE ARTHROSCOPY Bilateral     LIVER SURGERY      PANCREAS SURGERY      stents    PA INJECT NERV BLCK,CELIAC PLEXUS Bilateral 5/9/2017    Procedure: CELIAC PLEXUS BLOCK ;  Surgeon: Arsh Roman MD;  Location: [facility];  Service: Pain Management     PA INJECT NERV BLCK,CELIAC PLEXUS Bilateral 6/1/2017    Procedure: SPLANCHNIC NERVE BLOCK at T12;  Surgeon: Arsh Roman MD;  [facility];  Service: Pain Management     PA INJECT NERV BLCK,CELIAC PLEXUS Bilateral 8/8/2017    Procedure: BILATERAL SPLANCHNIC NERVE BLOCK T12;  Surgeon: Arsh Roman MD;  [facility];  Service: Pain Management     PA INJECT NERV [place] Martinet Bilateral 12/28/2017    Procedure: SPLANCHNIC NERVE BLOCK;  Surgeon: Arsh Roman MD;  [facility];  Service: Pain Management     PA LAP,CHOLECYSTECTOMY N/A 2/14/2017    Procedure: LAPAROSCOPIC CHOLECYSTECTOMY, IOC, POSSIBLE OPEN ;  Surgeon: Bishop Jay Jay MD;  [facility];  Service: General    ROTATOR CUFF REPAIR Right     SHOULDER ARTHROSCOPY      SHOULDER ARTHROSCOPY Right        Meds/Allergies:    Prior to Admission medications    Medication Sig Start Date End Date Taking?  Authorizing Provider   albuterol (PROVENTIL HFA,VENTOLIN HFA) 90 mcg/act inhaler Inhale 1-2 puffs every 4 (four) hours as needed for wheezing     Yes Historical Provider, MD   Chlorzoxazone (LORZONE) 750 MG TABS Take 750 mg by mouth 2 (two) times a day   Yes Historical Provider, MD   choline fenofibrate (TRILIPIX) 135 MG capsule Take 1 capsule (135 mg total) by mouth daily 3/26/18  Yes LUANA Johns   FLAXSEED, LINSEED, PO Take 1,000 mg by mouth 3 (three) times a day   Yes Historical Provider, MD   niacin (NIASPAN) 500 mg CR tablet Take 1 tablet (500 mg total) by mouth daily with breakfast 3/8/18  Yes LUANA Johns   omega-3-acid ethyl esters (LOVAZA) 1 g capsule Take 2 capsules (2 g total) by mouth 2 (two) times a day for 90 days 4/9/18 7/8/18 Yes LUANA Silveira   ondansetron (ZOFRAN) 4 mg tablet Take 1 tablet (4 mg total) by mouth every 8 (eight) hours as needed for nausea or vomiting 2/14/18  Yes LUANA Silveira   pancrelipase, Lip-Prot-Amyl, (CREON) 12,000 units capsule Take by mouth 4/13/17  Yes Historical Provider, MD   Pancrelipase, Lip-Prot-Amyl, (CREON) 33432 units CPEP Take 1 capsule (36,000 Units total) by mouth 3 (three) times a day before meals 3/19/18  Yes Dinora Son PA-C   rosuvastatin (CRESTOR) 20 MG tablet Take 1 tablet (20 mg total) by mouth daily 3/19/18  Yes LUANA Silveira   traZODone (DESYREL) 50 mg tablet Take 50 mg by mouth daily at bedtime   Yes Historical Provider, MD   ranitidine (ZANTAC) 150 mg tablet Take 150 mg by mouth 2 (two) times a day  4/18/18 Yes Historical Provider, MD   sucralfate (CARAFATE) 1 g/10 mL suspension Take 10 mL by mouth 2 (two) times a day before meals 11/18/17 4/18/18 Yes Gisselle Green MD   EPINEPHrine (EPIPEN 2-LARRY) 0 3 mg/0 3 mL SOAJ EpiPen 2-Larry 0 3 MG/0 3ML Injection Solution Auto-injector  INJECT 0 3ML INTRAMUSCULARLY AS DIRECTED     Quantity: 1;  Refills: 1      Alan CRAFT, Nikki Blocker ; Active  2 Solution Auto-injector Pen    Historical Provider, MD   ondansetron (ZOFRAN-ODT) 4 mg disintegrating tablet Take 1 tablet by mouth every 8 (eight) hours as needed for nausea or vomiting 11/29/17   Berna Rm MD   pantoprazole (PROTONIX) 40 mg tablet Take 1 tablet (40 mg total) by mouth 2 (two) times a day 3/23/18   LUANA Silveira   pantoprazole (PROTONIX) 40 mg Take by mouth    Historical Provider, MD   aspirin 325 mg tablet Take 325 mg by mouth daily    4/18/18  Historical Provider, MD   oxyCODONE-acetaminophen (PERCOCET) 5-325 mg per tablet Take 1-2 tablets by mouth every 6 (six) hours as needed for severe pain Max Daily Amount: 8 tablets 11/29/17 4/18/18  Berna Rm MD   pancrelipase, Lip-Prot-Amyl, (CREON) 24,000 units Take 1 capsule by mouth 3 (three) times a day with meals for 30 days  Patient taking differently: Take 12,000 Units by mouth daily   3/10/17 4/18/18  Romayne Rink, DO     I have reviewed home medications with patient personally  Allergies: Allergies   Allergen Reactions    Bee Venom Swelling       Social History:     Marital Status: /Civil Union   Occupation:   Patient Pre-hospital Living Situation:  Living with family  Patient Pre-hospital Level of Mobility:   Independent  tPre-hospital Level of Mobility:  Patient Pre-hospital Diet Restrictions:  Substance Use History:   History   Alcohol Use    Yes     Comment: rarely     History   Smoking Status    Current Every Day Smoker    Packs/day: 0 50    Years: 20 00   Smokeless Tobacco    Never Used     History   Drug Use No       Family History:    Family History   Problem Relation Age of Onset    Cirrhosis Mother        Physical Exam:     Vitals:   Blood Pressure: 150/89 (04/18/18 1104)  Pulse: 60 (04/18/18 1104)  Temperature: 98 4 °F (36 9 °C) (04/18/18 1104)  Temp Source: Oral (04/18/18 1104)  Respirations: 17 (04/18/18 1104)  Height: 5' 11" (180 3 cm) (04/18/18 1104)  Weight - Scale: 87 9 kg (193 lb 12 6 oz) (04/18/18 1104)  SpO2: 97 % (04/18/18 1104)    Physical Exam   Constitutional: He is oriented to person, place, and time  He appears well-developed and well-nourished  HENT:   Head: Normocephalic and atraumatic  Eyes: EOM are normal  Pupils are equal, round, and reactive to light  Neck: Normal range of motion  Neck supple  Cardiovascular: Normal rate and regular rhythm  Pulmonary/Chest: Effort normal and breath sounds normal    Abdominal: Soft  Bowel sounds are normal  There is tenderness  Musculoskeletal: Normal range of motion  Neurological: He is alert and oriented to person, place, and time  Skin: Skin is warm and dry  Psychiatric: He has a normal mood and affect           Additional Data:     Lab Results: I have personally reviewed pertinent reports  Results from last 7 days  Lab Units 04/18/18  0549   WBC Thousand/uL 2 22*   HEMOGLOBIN g/dL 14 9   HEMATOCRIT % 40 5   PLATELETS Thousands/uL 153   NEUTROS PCT % 63   LYMPHS PCT % 26   MONOS PCT % 7   EOS PCT % 3       Results from last 7 days  Lab Units 04/18/18  0549   SODIUM mmol/L 135*   POTASSIUM mmol/L 5 9*   CHLORIDE mmol/L 103   CO2 mmol/L 22   BUN mg/dL 9   CREATININE mg/dL 0 66   CALCIUM mg/dL 8 9   TOTAL PROTEIN g/dL 7 1   BILIRUBIN TOTAL mg/dL 1 74*   ALK PHOS U/L 108   ALT U/L 2,280*   AST U/L 2,947*   GLUCOSE RANDOM mg/dL 122           Imaging: I have personally reviewed pertinent reports  CT abdomen pelvis with contrast   Final Result by Zakia Skinner MD (04/18 4781)      Severe chronic gastritis with mild active inflammation of the gastric cardia and GE junction  Adjacent shotty reactive lymph nodes are present  No emphysematous gastritis, free gas, or abscess  Prominent soft tissue tract extending from the medial inferior gastric cardia inferiorly towards the posterior superior distal pancreatic body as seen on images 79 through 81 series 602  This is in the area of prior severe active inflammation and    attributed to scarring  No fluid or gaseous fistulous tract  Stable hepatomegaly  Trace new multifocal patchy groundglass opacities right middle lobe and right lung base attributed to scarring  The study was marked in [place] [address] for immediate notification  Workstation performed: [id number]             EKG, Pathology, and Other Studies Reviewed on Admission:   · EKG:     Allscripts / Epic Records Reviewed: Yes     ** Please Note: This note has been constructed using a voice recognition system   **

## 2018-04-18 NOTE — ED CARE HANDOFF
Emergency Department Sign Out Note        Sign out and transfer of care from Dr Kathi Martínez  See Separate Emergency Department note  The patient, Radha Morillo, was evaluated by the previous provider for   Workup Completed:  H&P, CBC    ED Course / Workup Pending (followup):  CMP, Lipase, CT scan    Laboratory studies were noted for wally id elevations of LFTs  I discussed this with GI as well as his CT findings  GI recommends admission for trending of LFTs and formal GI evaluation  ED Course      Procedures  MDM  CritCare Time      Disposition  Final diagnoses:   Acute hepatitis     Time reflects when diagnosis was documented in both MDM as applicable and the Disposition within this note     Time User Action Codes Description Comment    4/18/2018  9:51 AM Jo Hensley Add [B17 9] Acute hepatitis       ED Disposition     ED Disposition Condition Comment    Admit  Case was discussed with CAREY and the patient's admission status was agreed to be Admission Status: inpatient status to the service of Dr Sara Millard  Follow-up Information    None       Patient's Medications   Discharge Prescriptions    No medications on file     No discharge procedures on file         ED Provider  Electronically Signed by     Emperatriz Burt MD  04/18/18 0225

## 2018-04-18 NOTE — ASSESSMENT & PLAN NOTE
White count-2 22 on admission  Patient had no up history of leukopenia in the past   No evidence of active infection     Continue to monitor CBC, temps

## 2018-04-18 NOTE — ASSESSMENT & PLAN NOTE
Patient had multiple episodes of acute pancreatitis in the past   Currently lipase level is normal and CT scan did show any evidence of acute inflammation    Continue Creon replacement  Monitor clinically

## 2018-04-18 NOTE — ASSESSMENT & PLAN NOTE
Patient had elevated transaminases  Tylenol level negative  Check acute hepatitis panel  Follow-up with GI  Trend liver enzymes

## 2018-04-18 NOTE — LETTER
[address] 3RD FLOOR MED SURG UNIT  [address] [address]  No information on file  April 20, 2018     Patient: Breanne Jenkins   YOB: 1975   Date of Visit: 4/18/2018       To Whom it May Concern:    Breanne Jenkins is under my professional care  He was seen in the hospital from 4/18/2018   to 04/20/18  He may return to work on 04/23/2018  If you have any questions or concerns, please don't hesitate to call           Sincerely,          Perry Sheets MD

## 2018-04-18 NOTE — ED PROVIDER NOTES
History  Chief Complaint   Patient presents with    Abdominal Pain     c/o LUQ ABD pain  [place] Betancourt HX of pancratitis and states "this feels the same as last time"     37y o  year-old male presents with 1 day of epigastric abdominal pain with occasional radiation to the back  Patient describes severe "hurts" he has difficulty further characterizign that came on gradually, worsening and continues  Patient states drinking aggravates the pain and nothing alleviates it  Patient has a history of chronic pancreatitis secondary to hypertriglyceridemia, feels like similar episodes  ROS: Patient associates nausea with multiple episodes of nonbloody, nonbilious emesis; denies fever/chills, diarrhea, dyspnea, anorexia, constipation, diaphoresis, chest pain, groin pain, dysuria, hematuria, melena, or back/neck pain  All other systems reviewed and negative  Patient denies any recent illnesses  Patient denies any recent use of antibiotics, international travel, or trauma  Patient notes history of multiple prior abdominal surgeries due to chronic pancreatitis  Objective:   Vital signs reviewed  Constitutional: moderate acute distress  Eyes: No scleral icterus  HENT: Head normocephalic  Pharynx moist    CV: Regular rate and rhythm  Respiratory: Lungs clear to auscultation bilaterally without adventitious sounds  Abdomen: Inspection of an abdomen with previous abdominal surgical incisions noted without erythema, rashes or ecchymosis noted  No abdominal pulsations noted  Normal bowel sounds with no bruit auscultated  Soft abdomen  Palpitation noted tenderness in the epigastrium in the left upper quadrant with no tenderness in the remainder of the abdominal field; tenderness not over McBurney's point  No masses or pulsatile aorta noted on examination  No rebound or guarding noted on examination, non-peritoneal exam     Back: Normal inspection with no rash or signs of herpes zoster   Costovertebral tenderness not present  Skin: No ecchymosis of the umbilicus (negative [place]'s sign) or flank (negative [place]'s sign)  Warm and dry  Extremities: Non-tender lower extremities without asymmetry  Neuro: Alert  Answers questions appropriately  Psych: Normal mood and affect  Medical Decision Making   Abdominal pain with a broad differential  Will establish IV access and make patient NPO considering possibility of surgical intervention required  Will administer hydromorphone for pain control  Will initiate aggressive fluid resuscitation at this point in the assessment  Differential includes significant likelihood of intra-abdominal pathology and based on patient's exam findings and history  Will obtain CBC to evaluate for anemia and leukocytosis  Will obtain CMP to evaluate electrolytes, renal and hepatic function  Will obtain lipase to evaluate for potential pancreatitis  Will obtain lactate to evaluate for mesenteric ischemia and sepsis  Will obtain urinalysis to evaluate for possible urinary infectious sources and ketones to evaluate potential hydration state  Based on patient's history, physical exam and presenting complaint, will obtain contrast enhanced CT imaging of patient's abdomen and pelvis to further evaluate for possible intraabdominal pathology including peripancreatic abscess, diverticulitis, or obstruction  Abdominal Pain       Prior to Admission Medications   Prescriptions Last Dose Informant Patient Reported? Taking? Chlorzoxazone (LORZONE) 750 MG TABS 4/17/2018 at Unknown time  Yes Yes   Sig: Take 750 mg by mouth 2 (two) times a day   EPINEPHrine (EPIPEN 2-ARIEL) 0 3 mg/0 3 mL SOAJ   Yes No   Sig: EpiPen 2-Ariel 0 3 MG/0 3ML Injection Solution Auto-injector  INJECT 0 3ML INTRAMUSCULARLY AS DIRECTED     Quantity: 1;  Refills: 1      Toro Briceño ; Active  2 Solution Auto-injector Pen   FLAXSEED, LINSEED, PO 4/17/2018 at Unknown time Self Yes Yes   Sig: Take 1,000 mg by mouth 3 (three) times a day   Pancrelipase, Lip-Prot-Amyl, (CREON) 54164 units CPEP 4/17/2018 at Unknown time  No Yes   Sig: Take 1 capsule (36,000 Units total) by mouth 3 (three) times a day before meals   albuterol (PROVENTIL HFA,VENTOLIN HFA) 90 mcg/act inhaler Past Week at Unknown time  Yes Yes   Sig: Inhale 1-2 puffs every 4 (four) hours as needed for wheezing     niacin (NIASPAN) 500 mg CR tablet 4/17/2018 at Unknown time  No Yes   Sig: Take 1 tablet (500 mg total) by mouth daily with breakfast   omega-3-acid ethyl esters (LOVAZA) 1 g capsule 4/17/2018 at Unknown time  No Yes   Sig: Take 2 capsules (2 g total) by mouth 2 (two) times a day for 90 days   ondansetron (ZOFRAN) 4 mg tablet 4/17/2018 at Unknown time  No Yes   Sig: Take 1 tablet (4 mg total) by mouth every 8 (eight) hours as needed for nausea or vomiting   ondansetron (ZOFRAN-ODT) 4 mg disintegrating tablet   No No   Sig: Take 1 tablet by mouth every 8 (eight) hours as needed for nausea or vomiting   pancrelipase, Lip-Prot-Amyl, (CREON) 12,000 units capsule 4/17/2018 at Unknown time  Yes Yes   Sig: Take by mouth   pantoprazole (PROTONIX) 40 mg   Yes No   Sig: Take by mouth   pantoprazole (PROTONIX) 40 mg tablet   No No   Sig: Take 1 tablet (40 mg total) by mouth 2 (two) times a day   rosuvastatin (CRESTOR) 20 MG tablet 4/17/2018 at Unknown time  No Yes   Sig: Take 1 tablet (20 mg total) by mouth daily   traZODone (DESYREL) 50 mg tablet 4/17/2018 at Unknown time Self Yes Yes   Sig: Take 50 mg by mouth daily at bedtime      Facility-Administered Medications: None       Past Medical History:   Diagnosis Date    Asthma     Chronic pain disorder     GERD (gastroesophageal reflux disease)     Hyperlipidemia     Liver abscess     Meniscus tear     Pancreatitis        Past Surgical History:   Procedure Laterality Date    CHOLECYSTECTOMY      ESOPHAGOGASTRODUODENOSCOPY N/A 11/16/2017    Procedure: ESOPHAGOGASTRODUODENOSCOPY (EGD);   Surgeon: Tere Brenner MD; Location: [facility]; Service: Gastroenterology    ESOPHAGOGASTRODUODENOSCOPY N/A 4/19/2018    Procedure: ESOPHAGOGASTRODUODENOSCOPY (EGD); Surgeon: Benita George MD;  Location: [facility]; Service: Gastroenterology    KNEE ARTHROSCOPY Bilateral     LIVER SURGERY      PANCREAS SURGERY      stents    SD INJECT NERV BLCK,CELIAC PLEXUS Bilateral 5/9/2017    Procedure: CELIAC PLEXUS BLOCK ;  Surgeon: Barrett Briceño MD;  [facility]: [facility];  Service: Pain Management     SD INJECT NERV BLCK,CELIAC PLEXUS Bilateral 6/1/2017    Procedure: SPLANCHNIC NERVE BLOCK at T12;  Surgeon: Barrett Briceño MD;  [facility];  Service: Pain Management     SD INJECT NERV BLCK,CELIAC PLEXUS Bilateral 8/8/2017    Procedure: BILATERAL SPLANCHNIC NERVE BLOCK T12;  Surgeon: Barrett Briceño MD;  [facility];  Service: Pain Management     SD INJECT NERV Vamshi Gammons Bilateral 12/28/2017    Procedure: SPLANCHNIC NERVE BLOCK;  Surgeon: Barrett Briceño MD;  [facility];  Service: Pain Management     SD LAP,CHOLECYSTECTOMY N/A 2/14/2017    Procedure: LAPAROSCOPIC CHOLECYSTECTOMY, IOC, POSSIBLE OPEN ;  Surgeon: Isabella Wild MD;  [facility];  Service: General    ROTATOR CUFF REPAIR Right     SHOULDER ARTHROSCOPY      SHOULDER ARTHROSCOPY Right        Family History   Problem Relation Age of Onset    Cirrhosis Mother      I have reviewed and agree with the history as documented  Social History   Substance Use Topics    Smoking status: Current Every Day Smoker     Packs/day: 0 50     Years: 20 00    Smokeless tobacco: Never Used    Alcohol use Yes      Comment: rarely        Review of Systems   Gastrointestinal: Positive for abdominal pain         Physical Exam  ED Triage Vitals [04/18/18 0524]   Temperature Pulse Respirations Blood Pressure SpO2   98 1 °F (36 7 °C) 94 18 145/85 97 %      Temp Source Heart Rate Source Patient Position - Orthostatic VS BP Location FiO2 (%)   Oral Monitor Sitting Right arm --      Pain Score       Worst Possible Pain           Orthostatic Vital Signs  Vitals:    04/19/18 1622 04/19/18 1645 04/19/18 2253 04/20/18 0700   BP: 134/88 154/85 133/78 150/91   Pulse: 77 83 78 64   Patient Position - Orthostatic VS:  Lying Lying Lying       Physical Exam    ED Medications  Medications   ondansetron (ZOFRAN) injection 4 mg (4 mg Intravenous Given 4/18/18 0600)   HYDROmorphone (DILAUDID) injection 1 mg (1 mg Intravenous Given 4/18/18 0600)   sodium chloride 0 9 % bolus 1,000 mL (0 mL Intravenous Stopped 4/18/18 0725)   multi-electrolyte (ISOLYTE-S PH 7 4) bolus 1,000 mL (0 mL Intravenous Stopped 4/18/18 1019)   multi-electrolyte (ISOLYTE-S PH 7 4) bolus 1,000 mL (0 mL Intravenous Stopped 4/18/18 0839)   HYDROmorphone (DILAUDID) injection 1 mg (1 mg Intravenous Given 4/18/18 0723)   iohexol (OMNIPAQUE) 350 MG/ML injection (MULTI-DOSE) 100 mL (100 mL Intravenous Given 4/18/18 0731)   HYDROmorphone (DILAUDID) injection 1 mg (1 mg Intravenous Given 4/18/18 0753)   HYDROmorphone (DILAUDID) injection 1 mg (1 mg Intravenous Given 4/18/18 1030)       Diagnostic Studies  Results Reviewed     Procedure Component Value Units Date/Time    Hepatitis panel, acute [[id number]]  (Normal) Collected:  04/18/18 0958    Lab Status:  Final result Specimen:  Blood from Arm, Left Updated:  04/19/18 1015     Hepatitis B Surface Ag Non-reactive     Hep A IgM Non-reactive     Hepatitis C Ab Non-reactive     Hep B C IgM Non-reactive    Bilirubin, direct [[id number]]  (Abnormal) Collected:  04/18/18 0958    Lab Status:  Final result Specimen:  Blood from Arm, Left Updated:  04/18/18 1710     Bilirubin, Direct 0 22 (H) mg/dL     CK (with reflex to MB) [[id number]]  (Normal) Collected:  04/18/18 0958    Lab Status:  Final result Specimen:  Blood from Arm, Left Updated:  04/18/18 1304     Total  U/L     Ethanol [[id number]]  (Normal) Collected:  04/18/18 0945    Lab Status:  Final result Specimen:  Blood from Arm, Left Updated:  04/18/18 1036     Ethanol Lvl <3 mg/dL     Acetaminophen level [[id number]]  (Abnormal) Collected:  04/18/18 0958    Lab Status:  Final result Specimen:  Blood from Arm, Left Updated:  04/18/18 1025     Acetaminophen Level <2 0 (L) ug/mL     Salicylate level [[id number]]  (Abnormal) Collected:  04/18/18 0958    Lab Status:  Final result Specimen:  Blood from Arm, Left Updated:  46/33/39 8456     Salicylate Lvl <3 (L) mg/dL     UA w Reflex to Microscopic [[id number]]  (Normal) Collected:  04/18/18 0757    Lab Status:  Final result Specimen:  Urine Updated:  04/18/18 0810     Color, UA Yellow     Clarity, UA Clear     Specific Gravity, UA 1 010     pH, UA 7 5     Leukocytes, UA Negative     Nitrite, UA Negative     Protein, UA Negative mg/dl      Glucose, UA Negative mg/dl      Ketones, UA Negative mg/dl      Urobilinogen, UA 0 2 E U /dl      Bilirubin, UA Negative     Blood, UA Negative    CMP [[id number]]  (Abnormal) Collected:  04/18/18 0549    Lab Status:  Final result Specimen:  Blood from Arm, Right Updated:  04/18/18 7925     Sodium 135 (L) mmol/L      Potassium 5 9 (H) mmol/L      Chloride 103 mmol/L      CO2 22 mmol/L      Anion Gap 10 mmol/L      BUN 9 mg/dL      Creatinine 0 66 mg/dL      Glucose 122 mg/dL      Calcium 8 9 mg/dL      AST 2,947 (H) U/L      ALT 2,280 (H) U/L      Alkaline Phosphatase 108 U/L      Total Protein 7 1 g/dL      Albumin 3 0 (L) g/dL      Total Bilirubin 1 74 (H) mg/dL      eGFR 117 ml/min/1 73sq m     Narrative:         National Kidney Disease Education Program recommendations are as follows:  GFR calculation is accurate only with a steady state creatinine  Chronic Kidney disease less than 60 ml/min/1 73 sq  meters  Kidney failure less than 15 ml/min/1 73 sq  meters      Lipase [[id number]]  (Normal) Collected:  04/18/18 0549    Lab Status:  Final result Specimen:  Blood from Arm, Right Updated:  04/18/18 0705     Lipase 164 u/L     CBC and differential [[id number]] (Abnormal) Collected:  04/18/18 0549    Lab Status:  Final result Specimen:  Blood from Arm, Right Updated:  04/18/18 0558     WBC 2 22 (L) Thousand/uL      RBC 4 43 Million/uL      Hemoglobin 14 9 g/dL      Hematocrit 40 5 %      MCV 91 fL      MCH 33 6 pg      MCHC 36 8 g/dL      RDW 11 9 %      MPV 10 7 fL      Platelets 814 Thousands/uL      nRBC 0 /100 WBCs      Neutrophils Relative 63 %      Lymphocytes Relative 26 %      Monocytes Relative 7 %      Eosinophils Relative 3 %      Basophils Relative 1 %      Neutrophils Absolute 1 41 (L) Thousands/µL      Lymphocytes Absolute 0 57 (L) Thousands/µL      Monocytes Absolute 0 15 (L) Thousand/µL      Eosinophils Absolute 0 06 Thousand/µL      Basophils Absolute 0 02 Thousands/µL                  CT abdomen pelvis with contrast   Final Result by Storm Yost MD (04/18 1020)      Severe chronic gastritis with mild active inflammation of the gastric cardia and GE junction  Adjacent shotty reactive lymph nodes are present  No emphysematous gastritis, free gas, or abscess  Prominent soft tissue tract extending from the medial inferior gastric cardia inferiorly towards the posterior superior distal pancreatic body as seen on images 79 through 81 series 602  This is in the area of prior severe active inflammation and    attributed to scarring  No fluid or gaseous fistulous tract  Stable hepatomegaly  Trace new multifocal patchy groundglass opacities right middle lobe and right lung base attributed to scarring  The study was marked in [facility] for immediate notification             Workstation performed: [id number]                    Procedures  Procedures       Phone Contacts  ED Phone Contact    ED Course  ED Course                                MDM  CritCare Time    Disposition  Final diagnoses:   Acute hepatitis     Time reflects when diagnosis was documented in both MDM as applicable and the Disposition within this note     Time User Action Codes Description Comment    4/18/2018  9:51 AM Dayana HERNDON Add [B17 9] Acute hepatitis     4/18/2018 12:22 PM Mankala, Syamala Add [K29 00] Acute gastritis without hemorrhage     4/19/2018  8:38 AM Natalie Matthew Add [R10 13] Epigastric pain     4/19/2018  3:41 PM Javier Flowers Modify [K29 00] Acute gastritis without hemorrhage     4/19/2018  3:41 PM Javier Flowers Modify [R10 13] Epigastric pain     4/20/2018 10:49 AM Mankala, Syamala Modify [K29 00] Acute gastritis without hemorrhage     4/20/2018 10:49 AM Mankala, Syamala Modify [R10 13] Epigastric pain       ED Disposition     ED Disposition Condition Comment    Admit  Case was discussed with CAREY and the patient's admission status was agreed to be Admission Status: inpatient status to the service of Dr Whit Gaming          Follow-up Information     Follow up With Specialties Details Why Contact Info    Lex Watkins MD Gastroenterology Follow up  [address] [phone]      Noel Saavedra MD Family Medicine Schedule an appointment as soon as possible for a visit in 1 week(s)  [address] [facility]  [phone]          Discharge Medication List as of 4/20/2018 11:44 AM      START taking these medications    Details   oxyCODONE (ROXICODONE) 5 mg immediate release tablet Take 1 tablet (5 mg total) by mouth every 6 (six) hours as needed for moderate pain for up to 5 days Max Daily Amount: 20 mg, Starting Fri 4/20/2018, Until Wed 4/25/2018, Print         CONTINUE these medications which have NOT CHANGED    Details   albuterol (PROVENTIL HFA,VENTOLIN HFA) 90 mcg/act inhaler Inhale 1-2 puffs every 4 (four) hours as needed for wheezing  , Until Discontinued, Historical Med      FLAXSEED, LINSEED, PO Take 1,000 mg by mouth 3 (three) times a day, Until Discontinued, Historical Med      niacin (NIASPAN) 500 mg CR tablet Take 1 tablet (500 mg total) by mouth daily with breakfast, Starting Thu 3/8/2018, Normal omega-3-acid ethyl esters (LOVAZA) 1 g capsule Take 2 capsules (2 g total) by mouth 2 (two) times a day for 90 days, Starting Mon 4/9/2018, Until Sun 7/8/2018, Normal      ondansetron (ZOFRAN) 4 mg tablet Take 1 tablet (4 mg total) by mouth every 8 (eight) hours as needed for nausea or vomiting, Starting Wed 2/14/2018, Normal      !! pancrelipase, Lip-Prot-Amyl, (CREON) 12,000 units capsule Take by mouth, Starting Thu 4/13/2017, Historical Med      !! Pancrelipase, Lip-Prot-Amyl, (CREON) 86501 units CPEP Take 1 capsule (36,000 Units total) by mouth 3 (three) times a day before meals, Starting Mon 3/19/2018, Normal      rosuvastatin (CRESTOR) 20 MG tablet Take 1 tablet (20 mg total) by mouth daily, Starting Mon 3/19/2018, Normal      traZODone (DESYREL) 50 mg tablet Take 50 mg by mouth daily at bedtime, Until Discontinued, Historical Med      choline fenofibrate (TRILIPIX) 135 MG capsule Take 1 capsule (135 mg total) by mouth daily, Starting Mon 3/26/2018, Normal      EPINEPHrine (EPIPEN 2-ARIEL) 0 3 mg/0 3 mL SOAJ EpiPen 2-Ariel 0 3 MG/0 3ML Injection Solution Auto-injector  INJECT 0 3ML INTRAMUSCULARLY AS DIRECTED  Quantity: 1;  Refills: 1      Toro Briceño ; Active  2 Solution Auto-injector Pen, Historical Med      ondansetron (ZOFRAN-ODT) 4 mg disintegrating tablet Take 1 tablet by mouth every 8 (eight) hours as needed for nausea or vomiting, Starting Wed 11/29/2017, Print      pantoprazole (PROTONIX) 40 mg tablet Take 1 tablet (40 mg total) by mouth 2 (two) times a day, Starting Fri 3/23/2018, Normal       !! - Potential duplicate medications found  Please discuss with provider        STOP taking these medications       Chlorzoxazone (LORZONE) 750 MG TABS Comments:   Reason for Stopping:         pantoprazole (PROTONIX) 40 mg Comments:   Reason for Stopping:               Outpatient Discharge Orders  Discharge Diet     Activity as tolerated         ED Provider  Electronically Signed by           Millicent Ware Lilibeth Guardado MD  04/25/18 9985

## 2018-04-18 NOTE — LETTER
[address] FLOOR MED SURG UNIT  [address] [address]  No information on file  April 20, 2018     Patient: Hillary Zarco   YOB: 1975   Date of Visit: 4/18/2018       To Whom it May Concern:    Hillary Zarco is under my professional care  He was seen in the hospital from 4/18/2018   to 04/20/18  He {Return to school/sport/work:48264}  If you have any questions or concerns, please don't hesitate to call           Sincerely,          Humphrey Lewis MD

## 2018-04-18 NOTE — ASSESSMENT & PLAN NOTE
Differentials gastritis versus esophagitis versus infectious etiology/pancreatitis/acute hepatitis    CT of the abdomen showed Severe chronic gastritis with mild active inflammation of the gastric cardia and GE junction     -Protonix IV b i d  and sucralfate  GI consult  NPO after midnight For possible EGD tomorrow  Clear liquid diet, advance as tolerated

## 2018-04-19 ENCOUNTER — ANESTHESIA (INPATIENT)
Dept: PERIOP | Facility: HOSPITAL | Age: 43
DRG: 392 | End: 2018-04-19
Payer: COMMERCIAL

## 2018-04-19 ENCOUNTER — ANESTHESIA EVENT (INPATIENT)
Dept: PERIOP | Facility: HOSPITAL | Age: 43
DRG: 392 | End: 2018-04-19
Payer: COMMERCIAL

## 2018-04-19 PROBLEM — E78.1 HYPERTRIGLYCERIDEMIA: Status: ACTIVE | Noted: 2018-04-19

## 2018-04-19 LAB
ALBUMIN SERPL BCP-MCNC: 3.2 G/DL (ref 3.5–5)
ALP SERPL-CCNC: 68 U/L (ref 46–116)
ALT SERPL W P-5'-P-CCNC: 1231 U/L (ref 12–78)
ANION GAP SERPL CALCULATED.3IONS-SCNC: 8 MMOL/L (ref 4–13)
AST SERPL W P-5'-P-CCNC: 753 U/L (ref 5–45)
BASOPHILS # BLD AUTO: 0.02 THOUSANDS/ΜL (ref 0–0.1)
BASOPHILS NFR BLD AUTO: 1 % (ref 0–1)
BILIRUB SERPL-MCNC: 1.4 MG/DL (ref 0.2–1)
BUN SERPL-MCNC: 5 MG/DL (ref 5–25)
CALCIUM SERPL-MCNC: 8.4 MG/DL (ref 8.3–10.1)
CHLORIDE SERPL-SCNC: 100 MMOL/L (ref 100–108)
CHOLEST SERPL-MCNC: 183 MG/DL (ref 50–200)
CO2 SERPL-SCNC: 25 MMOL/L (ref 21–32)
CREAT SERPL-MCNC: 0.62 MG/DL (ref 0.6–1.3)
EOSINOPHIL # BLD AUTO: 0.11 THOUSAND/ΜL (ref 0–0.61)
EOSINOPHIL NFR BLD AUTO: 4 % (ref 0–6)
ERYTHROCYTE [DISTWIDTH] IN BLOOD BY AUTOMATED COUNT: 12.3 % (ref 11.6–15.1)
GFR SERPL CREATININE-BSD FRML MDRD: 120 ML/MIN/1.73SQ M
GLUCOSE SERPL-MCNC: 86 MG/DL (ref 65–140)
HAV IGM SER QL: NORMAL
HBV CORE IGM SER QL: NORMAL
HBV SURFACE AG SER QL: NORMAL
HCT VFR BLD AUTO: 38.5 % (ref 36.5–49.3)
HCV AB SER QL: NORMAL
HDLC SERPL-MCNC: 13 MG/DL (ref 40–60)
HGB BLD-MCNC: 13.3 G/DL (ref 12–17)
LDH SERPL-CCNC: 179 U/L (ref 81–234)
LYMPHOCYTES # BLD AUTO: 1.19 THOUSANDS/ΜL (ref 0.6–4.47)
LYMPHOCYTES NFR BLD AUTO: 38 % (ref 14–44)
MCH RBC QN AUTO: 31.9 PG (ref 26.8–34.3)
MCHC RBC AUTO-ENTMCNC: 34.5 G/DL (ref 31.4–37.4)
MCV RBC AUTO: 92 FL (ref 82–98)
MONOCYTES # BLD AUTO: 0.33 THOUSAND/ΜL (ref 0.17–1.22)
MONOCYTES NFR BLD AUTO: 11 % (ref 4–12)
NEUTROPHILS # BLD AUTO: 1.47 THOUSANDS/ΜL (ref 1.85–7.62)
NEUTS SEG NFR BLD AUTO: 47 % (ref 43–75)
NONHDLC SERPL-MCNC: 170 MG/DL
NRBC BLD AUTO-RTO: 0 /100 WBCS
PLATELET # BLD AUTO: 131 THOUSANDS/UL (ref 149–390)
PMV BLD AUTO: 10.5 FL (ref 8.9–12.7)
POTASSIUM SERPL-SCNC: 3.6 MMOL/L (ref 3.5–5.3)
PROT SERPL-MCNC: 6.4 G/DL (ref 6.4–8.2)
RBC # BLD AUTO: 4.17 MILLION/UL (ref 3.88–5.62)
SODIUM SERPL-SCNC: 133 MMOL/L (ref 136–145)
TRIGL SERPL-MCNC: 784 MG/DL
WBC # BLD AUTO: 3.14 THOUSAND/UL (ref 4.31–10.16)

## 2018-04-19 PROCEDURE — 99232 SBSQ HOSP IP/OBS MODERATE 35: CPT | Performed by: INTERNAL MEDICINE

## 2018-04-19 PROCEDURE — 83615 LACTATE (LD) (LDH) ENZYME: CPT | Performed by: PHYSICIAN ASSISTANT

## 2018-04-19 PROCEDURE — 88305 TISSUE EXAM BY PATHOLOGIST: CPT | Performed by: PATHOLOGY

## 2018-04-19 PROCEDURE — 0DB98ZX EXCISION OF DUODENUM, VIA NATURAL OR ARTIFICIAL OPENING ENDOSCOPIC, DIAGNOSTIC: ICD-10-PCS | Performed by: INTERNAL MEDICINE

## 2018-04-19 PROCEDURE — 88342 IMHCHEM/IMCYTCHM 1ST ANTB: CPT | Performed by: PATHOLOGY

## 2018-04-19 PROCEDURE — 0DB68ZX EXCISION OF STOMACH, VIA NATURAL OR ARTIFICIAL OPENING ENDOSCOPIC, DIAGNOSTIC: ICD-10-PCS | Performed by: INTERNAL MEDICINE

## 2018-04-19 PROCEDURE — 85025 COMPLETE CBC W/AUTO DIFF WBC: CPT | Performed by: INTERNAL MEDICINE

## 2018-04-19 PROCEDURE — 43239 EGD BIOPSY SINGLE/MULTIPLE: CPT | Performed by: INTERNAL MEDICINE

## 2018-04-19 PROCEDURE — 86235 NUCLEAR ANTIGEN ANTIBODY: CPT | Performed by: PHYSICIAN ASSISTANT

## 2018-04-19 PROCEDURE — 86038 ANTINUCLEAR ANTIBODIES: CPT | Performed by: PHYSICIAN ASSISTANT

## 2018-04-19 PROCEDURE — 80061 LIPID PANEL: CPT | Performed by: INTERNAL MEDICINE

## 2018-04-19 PROCEDURE — C9113 INJ PANTOPRAZOLE SODIUM, VIA: HCPCS | Performed by: INTERNAL MEDICINE

## 2018-04-19 PROCEDURE — 80053 COMPREHEN METABOLIC PANEL: CPT | Performed by: INTERNAL MEDICINE

## 2018-04-19 RX ORDER — PROPOFOL 10 MG/ML
INJECTION, EMULSION INTRAVENOUS AS NEEDED
Status: DISCONTINUED | OUTPATIENT
Start: 2018-04-19 | End: 2018-04-19 | Stop reason: SURG

## 2018-04-19 RX ORDER — LIDOCAINE HYDROCHLORIDE 10 MG/ML
INJECTION, SOLUTION INFILTRATION; PERINEURAL AS NEEDED
Status: DISCONTINUED | OUTPATIENT
Start: 2018-04-19 | End: 2018-04-19 | Stop reason: SURG

## 2018-04-19 RX ADMIN — PANTOPRAZOLE SODIUM 40 MG: 40 INJECTION, POWDER, FOR SOLUTION INTRAVENOUS at 10:30

## 2018-04-19 RX ADMIN — OXYCODONE HYDROCHLORIDE 10 MG: 10 TABLET ORAL at 04:09

## 2018-04-19 RX ADMIN — CYCLOBENZAPRINE HYDROCHLORIDE 5 MG: 10 TABLET, FILM COATED ORAL at 21:27

## 2018-04-19 RX ADMIN — HYDROMORPHONE HYDROCHLORIDE 2 MG: 2 INJECTION INTRAMUSCULAR; INTRAVENOUS; SUBCUTANEOUS at 06:10

## 2018-04-19 RX ADMIN — TRAZODONE HYDROCHLORIDE 50 MG: 50 TABLET ORAL at 21:27

## 2018-04-19 RX ADMIN — LIDOCAINE HYDROCHLORIDE 50 MG: 10 INJECTION, SOLUTION INFILTRATION; PERINEURAL at 15:34

## 2018-04-19 RX ADMIN — SODIUM CHLORIDE 150 ML/HR: 0.9 INJECTION, SOLUTION INTRAVENOUS at 09:36

## 2018-04-19 RX ADMIN — HEPARIN SODIUM 5000 UNITS: 5000 INJECTION, SOLUTION INTRAVENOUS; SUBCUTANEOUS at 21:27

## 2018-04-19 RX ADMIN — OXYCODONE HYDROCHLORIDE 10 MG: 10 TABLET ORAL at 13:33

## 2018-04-19 RX ADMIN — HYDROMORPHONE HYDROCHLORIDE 2 MG: 2 INJECTION INTRAMUSCULAR; INTRAVENOUS; SUBCUTANEOUS at 10:29

## 2018-04-19 RX ADMIN — HYDROMORPHONE HYDROCHLORIDE 2 MG: 2 INJECTION INTRAMUSCULAR; INTRAVENOUS; SUBCUTANEOUS at 21:20

## 2018-04-19 RX ADMIN — HEPARIN SODIUM 5000 UNITS: 5000 INJECTION, SOLUTION INTRAVENOUS; SUBCUTANEOUS at 06:10

## 2018-04-19 RX ADMIN — PROPOFOL 150 MG: 10 INJECTION, EMULSION INTRAVENOUS at 15:34

## 2018-04-19 RX ADMIN — PANTOPRAZOLE SODIUM 40 MG: 40 INJECTION, POWDER, FOR SOLUTION INTRAVENOUS at 21:27

## 2018-04-19 RX ADMIN — NICOTINE 1 PATCH: 14 PATCH, EXTENDED RELEASE TRANSDERMAL at 09:52

## 2018-04-19 RX ADMIN — OXYCODONE HYDROCHLORIDE 10 MG: 10 TABLET ORAL at 19:13

## 2018-04-19 RX ADMIN — ONDANSETRON 4 MG: 2 INJECTION INTRAMUSCULAR; INTRAVENOUS at 01:22

## 2018-04-19 RX ADMIN — OXYCODONE HYDROCHLORIDE 10 MG: 10 TABLET ORAL at 09:16

## 2018-04-19 RX ADMIN — HYDROMORPHONE HYDROCHLORIDE 2 MG: 2 INJECTION INTRAMUSCULAR; INTRAVENOUS; SUBCUTANEOUS at 16:57

## 2018-04-19 RX ADMIN — HYDROMORPHONE HYDROCHLORIDE 2 MG: 2 INJECTION INTRAMUSCULAR; INTRAVENOUS; SUBCUTANEOUS at 01:23

## 2018-04-19 RX ADMIN — ONDANSETRON 4 MG: 2 INJECTION INTRAMUSCULAR; INTRAVENOUS at 13:29

## 2018-04-19 RX ADMIN — ONDANSETRON 4 MG: 2 INJECTION INTRAMUSCULAR; INTRAVENOUS at 21:20

## 2018-04-19 RX ADMIN — HEPARIN SODIUM 5000 UNITS: 5000 INJECTION, SOLUTION INTRAVENOUS; SUBCUTANEOUS at 16:57

## 2018-04-19 NOTE — ASSESSMENT & PLAN NOTE
Patient has elevated triglycerides, in 7 100s  Statin and fenofibrate were held secondary to elevated transaminases    Discussed with GI, okay to resume starting from tomorrow as long as LFTs are trending down

## 2018-04-19 NOTE — ANESTHESIA PREPROCEDURE EVALUATION
Review of Systems/Medical History  Patient summary reviewed  Chart reviewed      Cardiovascular  Exercise tolerance: good,  Hyperlipidemia, Dysrhythmias, ,    Pulmonary  Smoker cigarette smoker  , Pneumonia, Asthma: PRN med  controlled Last rescue: < 6 months ago Asthma type of rescue: PRN inhaler,   Comment: H/O pneumonia 4 years ago; smoker 1/3 pack/day     GI/Hepatic    GERD well controlled, Liver disease, , Pancreatic problem,   Comment: H/O of pancreatitis (2 years ago), liver abscess (1 year ago)          Endo/Other     GYN       Hematology   Musculoskeletal       Neurology   Psychology         Lab Results   Component Value Date    WBC 3 14 (L) 04/19/2018    HGB 13 3 04/19/2018    HCT 38 5 04/19/2018    MCV 92 04/19/2018     (L) 04/19/2018     Lab Results   Component Value Date    GLUCOSE 86 04/19/2018    CALCIUM 8 4 04/19/2018     (L) 04/19/2018    K 3 6 04/19/2018    CO2 25 04/19/2018     04/19/2018    BUN 5 04/19/2018    CREATININE 0 62 04/19/2018     Lab Results   Component Value Date    INR 1 12 11/16/2017    INR 0 85 (L) 08/14/2017    PROTIME 14 6 (H) 11/16/2017    PROTIME 11 8 (L) 08/14/2017     Lab Results   Component Value Date    INR 1 12 11/16/2017    INR 0 85 (L) 08/14/2017    PROTIME 14 6 (H) 11/16/2017    PROTIME 11 8 (L) 08/14/2017     Lab Results   Component Value Date    HGBA1C 5 6 10/10/2016         Physical Exam    Airway    Mallampati score: III  TM Distance: >3 FB  Neck ROM: full     Dental   No notable dental hx     Cardiovascular  Rhythm: regular, Rate: normal,     Pulmonary  Pulmonary exam normal     Other Findings        Anesthesia Plan  ASA Score- 3     Anesthesia Type- IV sedation with anesthesia with ASA Monitors  Additional Monitors:   Airway Plan:         Plan Factors-Patient not instructed to abstain from smoking on day of procedure  Patient did not smoke on day of surgery  Induction- intravenous      Postoperative Plan-     Informed Consent- Anesthetic plan and risks discussed with patient  I personally reviewed this patient with the CRNA  Discussed and agreed on the Anesthesia Plan with the CRNA  Courtney Juarez

## 2018-04-19 NOTE — PROGRESS NOTES
Progress Note - Cindy Haver 1975, 37 y o  male MRN: [id number]    Unit/Bed#: -01 Encounter: [id number]    Primary Care Provider: Perico Ryan MD   Date and time admitted to hospital: 4/18/2018  5:21 AM        * Abdominal pain   Assessment & Plan    Differentials gastritis versus esophagitis versus infectious etiology/pancreatitis/acute hepatitis    CT of the abdomen showed Severe chronic gastritis with mild active inflammation of the gastric cardia and GE junction  Protonix IV b i d  and sucralfate  GI recommendations appreciated  EGD today               Elevated liver enzymes   Assessment & Plan    Patient had elevated transaminases and bilirubin  May be related to drug-induced versus ischemic origin  Patient was on [address] for muscle spasms and statin/fenofibrate for hypertriglyceridemia which were held since admission  Blood Tylenol level negative  Acute hepatitis panel negative  Liver enzymes trending down, continue to monitor CMP        Acute gastritis without hemorrhage   Assessment & Plan    Continue PPI  Follow up with results of EGD        Hypertriglyceridemia   Assessment & Plan    Patient has elevated triglycerides, in 7 100s  Statin and fenofibrate were held secondary to elevated transaminases  Discussed with GI, okay to resume starting from tomorrow as long as LFTs are trending down        Chronic pancreatitis [facility])   Assessment & Plan    Patient had multiple episodes of acute pancreatitis in the past   Currently lipase level is normal and CT scan did show any evidence of acute inflammation  Continue Creon replacement  Monitor clinically            VTE Pharmacologic Prophylaxis:   Pharmacologic: Enoxaparin (Lovenox)  Mechanical VTE Prophylaxis in Place: Yes    Patient Centered Rounds: I have performed bedside rounds with nursing staff today      Discussions with Specialists or Other Care Team Provider:  GI    Education and Discussions with Family / Patient:  Patient    Time Spent for Care: 20 minutes  More than 50% of total time spent on counseling and coordination of care as described above  Current Length of Stay: 1 day(s)    Current Patient Status: Inpatient   Certification Statement: The patient will continue to require additional inpatient hospital stay due to Management of abdominal pain, monitoring liver enzymes    Discharge Plan:  Follow up with GI, patient is clinically better with improving liver enzymes possibly tomorrow or on Saturday    Code Status: Level 1 - Full Code      Subjective:   Patient seen and examined  He does admit that his abdominal pain is slightly better, denies any nausea vomiting  Objective:     Vitals:   Temp (24hrs), Av 5 °F (36 9 °C), Min:98 4 °F (36 9 °C), Max:98 5 °F (36 9 °C)    HR:  [60-82] 82  Resp:  [18] 18  BP: (134-152)/(78-87) 152/87  SpO2:  [93 %-97 %] 95 %  Body mass index is 27 03 kg/m²  Input and Output Summary (last 24 hours): Intake/Output Summary (Last 24 hours) at 18 1351  Last data filed at 18 2031   Gross per 24 hour   Intake          1077 09 ml   Output                0 ml   Net          1077 09 ml       Physical Exam:     Physical Exam   Constitutional: He appears well-developed and well-nourished  HENT:   Head: Normocephalic and atraumatic  Eyes: EOM are normal  Pupils are equal, round, and reactive to light  Neck: Normal range of motion  Neck supple  Cardiovascular: Normal rate and regular rhythm  Pulmonary/Chest: Effort normal and breath sounds normal    Abdominal: Soft  Bowel sounds are normal  There is tenderness  Musculoskeletal: Normal range of motion  Neurological: He is alert  Skin: Skin is warm           Additional Data:     Labs:      Results from last 7 days  Lab Units 18  0608   WBC Thousand/uL 3 14*   HEMOGLOBIN g/dL 13 3   HEMATOCRIT % 38 5   PLATELETS Thousands/uL 131*   NEUTROS PCT % 47   LYMPHS PCT % 38   MONOS PCT % 11   EOS PCT % 4       Results from last 7 days  Lab Units 04/19/18  0608   SODIUM mmol/L 133*   POTASSIUM mmol/L 3 6   CHLORIDE mmol/L 100   CO2 mmol/L 25   BUN mg/dL 5   CREATININE mg/dL 0 62   CALCIUM mg/dL 8 4   TOTAL PROTEIN g/dL 6 4   BILIRUBIN TOTAL mg/dL 1 40*   ALK PHOS U/L 68   ALT U/L 1,231*   AST U/L 753*   GLUCOSE RANDOM mg/dL 86           * I Have Reviewed All Lab Data Listed Above  * Additional Pertinent Lab Tests Reviewed: [address] Admission Reviewed    Imaging:      Recent Cultures (last 7 days):           Last 24 Hours Medication List:     Current Facility-Administered Medications:  acetaminophen 650 mg Oral Q6H PRN Enrico Hays MD    albuterol 1 puff Inhalation Q4H PRN Enrico Hays MD    cyclobenzaprine 5 mg Oral HS Enrico Hays MD    fish oil 1,000 mg Oral Daily Enrico Hays MD    heparin (porcine) 5,000 Units Subcutaneous Q8H [address] Enrico Hays MD    HYDROmorphone 2 mg Intravenous Q4H PRN Tony Arizmendi MD    niacin 500 mg Oral Daily With Merlin Barlow, MD    nicotine 1 patch Transdermal Daily Enrico Hays MD    ondansetron 4 mg Intravenous Q6H PRN Enrico Hays MD    oxyCODONE 10 mg Oral Q4H PRN Tony Arizmendi MD    pantoprazole 40 mg Intravenous Q12H Sara Mario MD    sodium chloride 150 mL/hr Intravenous Continuous Karishma Griffin, PA-C Last Rate: 150 mL/hr (04/19/18 0936)   traZODone 50 mg Oral HS Enrico Hays MD      Facility-Administered Medications Ordered in Other Encounters:  oxyCODONE-acetaminophen 2 tablet Oral Once Jimmy Ashraf MD        Today, Patient Was Seen By: Enrico Hays MD    ** Please Note: Dictation voice to text software may have been used in the creation of this document   **

## 2018-04-19 NOTE — ANESTHESIA POSTPROCEDURE EVALUATION
Post-Op Assessment Note      CV Status:  Stable    Mental Status:  Alert and awake    Hydration Status:  Euvolemic    PONV Controlled:  Controlled    Airway Patency:  Patent    Post Op Vitals Reviewed: Yes          Staff: CRNA           /80 (04/19/18 1545)    Temp 98 6 °F (37 °C) (04/19/18 1545)    Pulse 77 (04/19/18 1545)   Resp (!) 10 (04/19/18 1545)    SpO2 95 % (04/19/18 1545)

## 2018-04-19 NOTE — OP NOTE
**** GI/ENDOSCOPY REPORT ****     PATIENT NAME: Wendie Metzger - VISIT ID:  Patient ID: [id number]   YOB: 1975     INTRODUCTION: Esophagogastroduodenoscopy - A 37 male patient presents for   an inpatient Esophagogastroduodenoscopy at [address] [place] [address]  INDICATIONS: Abnormal CT scan (esophagitis, gastritis, and   lymphadenopathy)  CONSENT: The benefits, risks, and alternatives to the procedure were   discussed and informed consent was obtained from the patient  PREPARATION:  EKG, pulse, pulse oximetry and blood pressure were monitored   throughout the procedure  ASA Classification: Class 3 - Patient has severe   systemic disturbance that may or may not be related to the disorder   requiring surgery  MEDICATIONS: Anesthesia-check records     PROCEDURE:  The endoscope was passed without difficulty through the mouth   under direct visualization and advanced to the 2nd portion of the   duodenum  The scope was withdrawn and the mucosa was carefully examined  Retroflexion was performed  FINDINGS:   Esophagus: The esophagus appeared to be normal    GE junction:   The GE junction appeared to be normal   Stomach: Gastritis was found in   the stomach  Multiple biopsies was taken  Duodenum: The duodenum   appeared to be normal    Multiple random biopsies was taken  COMPLICATIONS: There were no complications  IMPRESSIONS: Normal esophagus  Normal GE junction  Gastritis found  Multiple biopsies taken  Normal duodenum  Multiple biopsies taken  RECOMMENDATIONS: Follow-up on the results of the biopsy specimens  Anti-reflux measures: Raise the head of the bed 4 to 6 inches  Avoid   smoking  Avoid excess coffee, tea or other caffeinated beverages  Avoid   garments that fit tightly through the abdomen  Avoid eating before bed  Continue current medications  Resume previous diet  Schedule outpatient   endoscopic ultrasound       ESTIMATED BLOOD LOSS:     PATHOLOGY SPECIMENS: Multiple biopsies taken  Associated finding:   Gastritis  Multiple random biopsies taken  PROCEDURE CODES:     ICD-9 Codes: 535 50 Unspecified gastritides and gastroduodenitis, without   mention of hemorrhage     ICD-10 Codes: K29 Gastritis and duodenitis     PERFORMED BY: DAINA Duggan  on 04/19/2018  Version 1, electronically signed by DAINA Ramires  on 04/19/2018   at 15:54

## 2018-04-19 NOTE — ASSESSMENT & PLAN NOTE
Patient had elevated transaminases and bilirubin  May be related to drug-induced versus ischemic origin  Patient was on [address] for muscle spasms and statin/fenofibrate for hypertriglyceridemia which were held since admission  Blood Tylenol level negative  Acute hepatitis panel negative      Liver enzymes trending down, continue to monitor CMP

## 2018-04-19 NOTE — CASE MANAGEMENT
Initial Clinical Review    Admission: Date/Time/Statement: 4/18/18 @ 0952     Orders Placed This Encounter   Procedures    Inpatient Admission (expected length of stay for this patient is greater than two midnights)     Standing Status:   Standing     Number of Occurrences:   1     Order Specific Question:   Admitting Physician     Answer:   Jay Scanlon [[id number]]     Order Specific Question:   Level of Care     Answer:   Med Surg [[id number]]     Order Specific Question:   Estimated length of stay     Answer:   More than 2 Midnights     Order Specific Question:   Certification     Answer:   I certify that inpatient services are medically necessary for this patient for a duration of greater than two midnights  See H&P and MD Progress Notes for additional information about the patient's course of treatment  ED: Date/Time/Mode of Arrival:   ED Arrival Information     Expected Arrival Acuity Means of Arrival Escorted By Service Admission Type    - 4/18/2018 05:16 Urgent Walk-In Self General Medicine Urgent    Arrival Complaint    abd pain          Chief Complaint:   Chief Complaint   Patient presents with    Abdominal Pain     c/o LUQ ABD pain  Ferol Frank HX of pancratitis and states "this feels the same as last time"       History of Illness: Toby Olivarez is a 37 y o  male who presents with abdominal pain  Patient has prior past medical history of gastritis, chronic pancreatitis believed likely from elevated triglycerides status post cholecystectomy  Patient also has history of chronic abdominal pain for which he gets celiac pleuxes block follows with pain management  Abdominalpain started 2 days ago was getting worse, radiating to the back, associated with nausea and multiple episodes of emesis  Denies any hematemesis, melena, black stools, diarrhea  No sick contacts  Denies any fevers or chills  Is a chronic smoker, also admits to have taken a few pills of Aleve every day for knee pain   His recent alcohol use was 1 week ago(a couple beers)  Patient had EGD 6 months ago which showed evidence of esophagitis, was placed on Protonix and sucralfate  Patient reports taking those medications regularly  ED Vital Signs:   ED Triage Vitals [04/18/18 0524]   Temperature Pulse Respirations Blood Pressure SpO2   98 1 °F (36 7 °C) 94 18 145/85 97 %      Temp Source Heart Rate Source Patient Position - Orthostatic VS BP Location FiO2 (%)   Oral Monitor Sitting Right arm --      Pain Score       Worst Possible Pain        Wt Readings from Last 1 Encounters:   04/18/18 87 9 kg (193 lb 12 6 oz)       Vital Signs (abnormal): wnl    Abnormal Labs/Diagnostic Test Results: na   135, K   5 9, ast   2947, alt  2280, alb  3 0, total bili  1 74, wbc  2 22  CT abd - Severe chronic gastritis with mild active inflammation of the gastric cardia and GE junction   Adjacent shotty reactive lymph nodes are present   No emphysematous gastritis, free gas, or abscess  Prominent soft tissue tract extending from the medial inferior gastric cardia inferiorly towards the posterior superior distal pancreatic body as seen on images 79 through 81 series 602   This is in the area of prior severe active inflammation and   attributed to scarring   No fluid or gaseous fistulous tract  Stable hepatomegaly  Trace new multifocal patchy groundglass opacities right middle lobe and right lung base attributed to scarring      ED Treatment:   Medication Administration from 04/18/2018 0516 to 04/18/2018 1101       Date/Time Order Dose Route Action Action by Comments     04/18/2018 0600 ondansetron (ZOFRAN) injection 4 mg 4 mg Intravenous Given Yoandy Bales RN      04/18/2018 0600 HYDROmorphone (DILAUDID) injection 1 mg 1 mg Intravenous Given Yoandy Bales RN      04/18/2018 0725 sodium chloride 0 9 % bolus 1,000 mL 0 mL Intravenous Lenny Cuba RN      04/18/2018 0618 sodium chloride 0 9 % bolus 1,000 mL 1,000 mL Intravenous Gerard Munson RN 04/18/2018 1019 multi-electrolyte (ISOLYTE-S PH 7 4) bolus 1,000 mL 0 mL Intravenous Stopped You Chacortag, RN      04/18/2018 0656 multi-electrolyte (ISOLYTE-S PH 7 4) bolus 1,000 mL 1,000 mL Intravenous New Bag Harris Cooks, RN      04/18/2018 1976 multi-electrolyte (ISOLYTE-S PH 7 4) bolus 1,000 mL 0 mL Intravenous Stopped You Austinisabel, RN      04/18/2018 9842 multi-electrolyte (ISOLYTE-S PH 7 4) bolus 1,000 mL 1,000 mL Intravenous [address] You [facility]      04/18/2018 9405 HYDROmorphone (DILAUDID) injection 1 mg 1 mg Intravenous Given Avelino Melissa RN      04/18/2018 0731 iohexol (OMNIPAQUE) 350 MG/ML injection (MULTI-DOSE) 100 mL 100 mL Intravenous Given Malgorzata Seaytist      04/18/2018 0753 HYDROmorphone (DILAUDID) injection 1 mg 1 mg Intravenous Given Avelino Melissa RN      04/18/2018 1030 HYDROmorphone (DILAUDID) injection 1 mg 1 mg Intravenous Given Avelino Melissa RN           Past Medical/Surgical History:    Active Ambulatory Problems     Diagnosis Date Noted    Pancreatic lesion 02/14/2017    Renal mass 03/02/2017    Smoker 03/02/2017    RBBB 11/16/2017    Epigastric pain 11/15/2017    Acute gastritis without hemorrhage 11/15/2017     Resolved Ambulatory Problems     Diagnosis Date Noted    Leukocytosis 11/16/2017    Positive D dimer 11/16/2017    Chest pain 11/16/2017    Epigastric abdominal pain 11/16/2017    HLD (hyperlipidemia) 11/16/2017     Past Medical History:   Diagnosis Date    Asthma     Chronic pain disorder     GERD (gastroesophageal reflux disease)     Hyperlipidemia     Liver abscess     Meniscus tear     Pancreatitis        Admitting Diagnosis: Acute hepatitis [B17 9]  Abdominal pain [R10 9]    Age/Sex: 37 y o  male    Assessment/Plan:   Abdominal pain   Assessment & Plan     Differentials gastritis versus esophagitis versus infectious etiology/pancreatitis/acute hepatitis   CT of the abdomen showed Severe chronic gastritis with mild active inflammation of the gastric cardia and GE junction    -Protonix IV b i d  and sucralfate  GI consult  NPO after midnight For possible EGD tomorrow  Clear liquid diet, advance as tolerated        Leukopenia   Assessment & Plan     White count-2 22 on admission  Patient had no up history of leukopenia in the past   No evidence of active infection  Continue to monitor CBC, temps        Chronic pancreatitis [facility])   Assessment & Plan     Patient had multiple episodes of acute pancreatitis in the past   Currently lipase level is normal and CT scan did show any evidence of acute inflammation  Continue Creon replacement  Monitor clinically        Transaminitis   Assessment & Plan     Patient had elevated transaminases  Tylenol level negative    Check acute hepatitis panel  Follow-up with GI  Trend liver enzymes         Smoker   Assessment & Plan     Nicotine patch  Counseled on smoking cessation           VTE Prophylaxis: Heparin  / sequential compression device   Code Status: level 1  POLST: There is no POLST form on file for this patient (pre-hospital)  Discussion with family:   Anticipated Length of Stay:  Patient will be admitted on an Inpatient basis with an anticipated length of stay of greater 2 midnights     Justification for Hospital Stay:  Abdominal pain evaluation     Admission Orders:  Scheduled Meds:   Current Facility-Administered Medications:  acetaminophen 650 mg Oral Q6H PRN Gideon Chandra MD    albuterol 1 puff Inhalation Q4H PRN Gideon Chandra MD    cyclobenzaprine 5 mg Oral HS Gideon Chandar MD    fish oil 1,000 mg Oral Daily Gideon Chandra MD    heparin (porcine) 5,000 Units Subcutaneous Q8H [address] Gideon Chandra MD    HYDROmorphone 2 mg Intravenous Q4H PRN Fabrice Salmon MD    niacin 500 mg Oral Daily With Breakfast Gideon Chandra MD    nicotine 1 patch Transdermal Daily Gideon Chandra MD    ondansetron 4 mg Intravenous Q6H PRN Gideon Chandra MD    oxyCODONE 10 mg Oral Q4H PRN Matthew Lencho Fu MD    pantoprazole 40 mg Intravenous Q12H [address] Destiny Morales MD    sodium chloride 150 mL/hr Intravenous Continuous Vannesa Snider PA-C Last Rate: 150 mL/hr (04/19/18 0936)   traZODone 50 mg Oral HS Destiny Morales MD      Facility-Administered Medications Ordered in Other Encounters:  oxyCODONE-acetaminophen 2 tablet Oral Once Lizzette Castro MD     Continuous Infusions:   sodium chloride 150 mL/hr Last Rate: 150 mL/hr (04/19/18 0936)     PRN Meds:   acetaminophen    albuterol    HYDROmorphone  x4    Ondansetron   x2    oxyCODONE   x4    NPO   Act as colby   Up and OOB as colby   SCD  EGD- 4/19  GI consult   4/19  Lipid profile , cmp, cbc, LD , DEANNA  , anti-smooth    GI consult  4/18  ASSESSMENT and PLAN:    Principal Problem:    Abdominal pain  Active Problems:    Smoker    Acute gastritis without hemorrhage    Transaminitis    Chronic pancreatitis (HCC)    Leukopenia   Transaminitis  Hyperbilirubinemia  - Significant transaminitis on admission of AST 2,946/ALT 2,280 with a Tbili of 1 74  - Suspect this may be ischemic hepatitis due to report of feeling lightheaded, dizzy, unable to stand up at times v acute viral hepatitis v hepatotoxicity from lorzone for pain management v component of alcoholic hepatitis  - Tylenol, salicylate, and med alcohol level neg on admission  - Pt reports he had a few beers on Sunday but otherwise denies significant binge  - Vitals have been stable since admission with no episodes of hypotension  - CT on admission shows hepatomegaly, no hepatic lesions, no biliary dilatation, s/p cholecystectomy  - Check direct bilirubin  - Check CMP, INR, acute hep panel in AM  - Hold lorzone and hepatotoxic medications  - Hold statin and fenofibrate for now; these can be restarted if LFTs are coming down  - If LFTs do not improve, may need to get an MRI/MRCP to evaluate for microlithiasis or sludge though this seems mostly hepatocellular  - Pt again educated on important of abstinence from alcohol, even occasional intake   Upper Abdominal Pain  Chronic Pancreatitis  - CT on admission shows 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; severe gastritis up to the GE junction, slightly prominent soft tissue from the gastric cardia towards the distal pancreatic body of unclear etiology and could represent scarring from prior pancreatitis  - Depending on his symptoms and LFTs, MRI/MRCP may be of benefit for further clarification if any biliary disease and to look at pancreas for any evidence of acute inflammation  - Lipase normal on admission  - Due to significant pain, keep NPO for bowel and pancreas rest  - IVF hydration, antiemetics and pain control PRN   Chronic Gastritis/Esophagitis  Abnormal CT scan  - He feels his chronic reflux is controlled on protonix 40 mg BID but due to chronic findings of severe gastritis and thickening at the GE junction with lymphadenopathy, he should have a repeat EGD  - He is also using NSAIDs for knee pain and was taking matilda seltzer recently  - Tentative EGD tomorrow to evaluate for PUD and rebiopsy for Soriano's  - If not significant findings, an EUS should be considered to evaluate the persistent LAD which was noted on his CT on November as well

## 2018-04-19 NOTE — ASSESSMENT & PLAN NOTE
Differentials gastritis versus esophagitis versus infectious etiology/pancreatitis/acute hepatitis    CT of the abdomen showed Severe chronic gastritis with mild active inflammation of the gastric cardia and GE junction      Protonix IV b i d  and sucralfate  GI recommendations appreciated  EGD today

## 2018-04-20 ENCOUNTER — TELEPHONE (OUTPATIENT)
Dept: GASTROENTEROLOGY | Facility: CLINIC | Age: 43
End: 2018-04-20

## 2018-04-20 VITALS
RESPIRATION RATE: 17 BRPM | WEIGHT: 193.78 LBS | OXYGEN SATURATION: 94 % | SYSTOLIC BLOOD PRESSURE: 150 MMHG | BODY MASS INDEX: 27.13 KG/M2 | DIASTOLIC BLOOD PRESSURE: 91 MMHG | HEIGHT: 71 IN | HEART RATE: 64 BPM | TEMPERATURE: 97.6 F

## 2018-04-20 LAB
ACTIN IGG SERPL-ACNC: 7 UNITS (ref 0–19)
ALBUMIN SERPL BCP-MCNC: 2.6 G/DL (ref 3.5–5)
ALP SERPL-CCNC: 86 U/L (ref 46–116)
ALT SERPL W P-5'-P-CCNC: 772 U/L (ref 12–78)
ANION GAP SERPL CALCULATED.3IONS-SCNC: 5 MMOL/L (ref 4–13)
AST SERPL W P-5'-P-CCNC: 288 U/L (ref 5–45)
BILIRUB SERPL-MCNC: 0.9 MG/DL (ref 0.2–1)
BUN SERPL-MCNC: 7 MG/DL (ref 5–25)
CALCIUM SERPL-MCNC: 8.7 MG/DL (ref 8.3–10.1)
CHLORIDE SERPL-SCNC: 101 MMOL/L (ref 100–108)
CO2 SERPL-SCNC: 31 MMOL/L (ref 21–32)
CREAT SERPL-MCNC: 0.75 MG/DL (ref 0.6–1.3)
GFR SERPL CREATININE-BSD FRML MDRD: 111 ML/MIN/1.73SQ M
GLUCOSE SERPL-MCNC: 138 MG/DL (ref 65–140)
POTASSIUM SERPL-SCNC: 3.6 MMOL/L (ref 3.5–5.3)
PROT SERPL-MCNC: 6.6 G/DL (ref 6.4–8.2)
RYE IGE QN: NEGATIVE
SODIUM SERPL-SCNC: 137 MMOL/L (ref 136–145)

## 2018-04-20 PROCEDURE — 80053 COMPREHEN METABOLIC PANEL: CPT | Performed by: INTERNAL MEDICINE

## 2018-04-20 PROCEDURE — 99238 HOSP IP/OBS DSCHRG MGMT 30/<: CPT | Performed by: INTERNAL MEDICINE

## 2018-04-20 PROCEDURE — 99232 SBSQ HOSP IP/OBS MODERATE 35: CPT | Performed by: INTERNAL MEDICINE

## 2018-04-20 PROCEDURE — C9113 INJ PANTOPRAZOLE SODIUM, VIA: HCPCS | Performed by: INTERNAL MEDICINE

## 2018-04-20 RX ORDER — PANTOPRAZOLE SODIUM 40 MG/1
40 TABLET, DELAYED RELEASE ORAL
Status: DISCONTINUED | OUTPATIENT
Start: 2018-04-20 | End: 2018-04-20 | Stop reason: HOSPADM

## 2018-04-20 RX ORDER — OXYCODONE HYDROCHLORIDE 5 MG/1
5 TABLET ORAL EVERY 6 HOURS PRN
Qty: 20 TABLET | Refills: 0 | Status: SHIPPED | OUTPATIENT
Start: 2018-04-20 | End: 2018-04-25 | Stop reason: ALTCHOICE

## 2018-04-20 RX ADMIN — HYDROMORPHONE HYDROCHLORIDE 2 MG: 2 INJECTION INTRAMUSCULAR; INTRAVENOUS; SUBCUTANEOUS at 05:56

## 2018-04-20 RX ADMIN — NIACIN 500 MG: 500 TABLET, FILM COATED, EXTENDED RELEASE ORAL at 08:34

## 2018-04-20 RX ADMIN — ONDANSETRON 4 MG: 2 INJECTION INTRAMUSCULAR; INTRAVENOUS at 08:01

## 2018-04-20 RX ADMIN — HEPARIN SODIUM 5000 UNITS: 5000 INJECTION, SOLUTION INTRAVENOUS; SUBCUTANEOUS at 05:55

## 2018-04-20 RX ADMIN — OXYCODONE HYDROCHLORIDE 10 MG: 10 TABLET ORAL at 08:49

## 2018-04-20 RX ADMIN — NICOTINE 1 PATCH: 14 PATCH, EXTENDED RELEASE TRANSDERMAL at 08:35

## 2018-04-20 RX ADMIN — PANTOPRAZOLE SODIUM 40 MG: 40 INJECTION, POWDER, FOR SOLUTION INTRAVENOUS at 08:34

## 2018-04-20 RX ADMIN — Medication 1000 MG: at 08:34

## 2018-04-20 RX ADMIN — HYDROMORPHONE HYDROCHLORIDE 2 MG: 2 INJECTION INTRAMUSCULAR; INTRAVENOUS; SUBCUTANEOUS at 10:03

## 2018-04-20 NOTE — PLAN OF CARE
DISCHARGE PLANNING     Discharge to home or other facility with appropriate resources Progressing        GASTROINTESTINAL - ADULT     Maintains or returns to baseline bowel function Progressing        INFECTION - ADULT     Absence or prevention of progression during hospitalization Progressing     Absence of fever/infection during neutropenic period Progressing        Knowledge Deficit     Patient/family/caregiver demonstrates understanding of disease process, treatment plan, medications, and discharge instructions Progressing        PAIN - ADULT     Verbalizes/displays adequate comfort level or baseline comfort level Progressing        Potential for Falls     Patient will remain free of falls Progressing        SAFETY ADULT     Patient will remain free of falls Progressing     Maintain or return to baseline ADL function Progressing     Maintain or return mobility status to optimal level Progressing

## 2018-04-20 NOTE — TELEPHONE ENCOUNTER
----- Message from Jenny Negrete PA-C sent at 4/20/2018 11:15 AM EDT -----  This patient needs an outpatient EUS with Dr Jose Raul Hartley in the next few weeks  He will be discharged from [facility] today  Thank you

## 2018-04-20 NOTE — PROGRESS NOTES
The pantoprazole has / have been converted to Oral per [facility] for Adults as approved by the Pharmacy and Therapeutics Committee  The patient met all eligible criteria:  3 Age = 23years old   2) Received at least one dose of the IV form   3) Receiving at least one other scheduled oral/enteral medication   4) Tolerating an oral/enteral diet   and did not have any exclusions:   1) Critical care patient   2) Active GI bleed (IF assessing H2RAs or PPIs)   3) Continuous tube feeding (IF assessing cipro, doxycycline, levofloxacin, minocycline, rifampin, or voriconazole)   4) Receiving PO vancomycin (IF assessing metronidazole)   5) Persistent nausea and/or vomiting   6) Ileus or gastrointestinal obstruction   7) Kaleigh/nasogastric tube set for continuous suction   8) Specific order not to automatically convert to PO (in the order's comments or if discussed in the most recent Infectious Disease or primary team's progress notes)

## 2018-04-20 NOTE — PROGRESS NOTES
GI Progress Note - Jose Simon 37 y o  male MRN: [id number]    Unit/Bed#: -01 Encounter: [id number]    Subjective: He had an episode of emesis after his meal last night  He tolerated breakfast this morning with no problems thus far  Overall his pain is improved  Objective:     Vitals: Blood pressure 150/91, pulse 64, temperature 97 6 °F (36 4 °C), temperature source Oral, resp  rate 17, height 5' 11" (1 803 m), weight 87 9 kg (193 lb 12 6 oz), SpO2 94 %  ,Body mass index is 27 03 kg/m²  Intake/Output Summary (Last 24 hours) at 04/20/18 1107  Last data filed at 04/19/18 1542   Gross per 24 hour   Intake              100 ml   Output                0 ml   Net              100 ml       Physical Exam:     General Appearance: Alert, oriented x3, no acute distress  Lungs: CTA bilaterally, no respiratory distress  Heart: RRR, no murmur  Abdomen: Non-distended, bowel sounds active, soft, (+) mild TTP in the epigastric region  Extremities: No cyanosis or LE edema    Invasive Devices     Peripheral Intravenous Line            Peripheral IV 04/18/18 Left Arm 2 days    Peripheral IV 04/18/18 Left Hand 2 days                Lab Results:    Results from last 7 days  Lab Units 04/19/18  0608   WBC Thousand/uL 3 14*   HEMOGLOBIN g/dL 13 3   HEMATOCRIT % 38 5   PLATELETS Thousands/uL 131*   NEUTROS PCT % 47   LYMPHS PCT % 38   MONOS PCT % 11   EOS PCT % 4       Results from last 7 days  Lab Units 04/20/18  0436   SODIUM mmol/L 137   POTASSIUM mmol/L 3 6   CHLORIDE mmol/L 101   CO2 mmol/L 31   BUN mg/dL 7   CREATININE mg/dL 0 75   CALCIUM mg/dL 8 7   TOTAL PROTEIN g/dL 6 6   BILIRUBIN TOTAL mg/dL 0 90   ALK PHOS U/L 86   ALT U/L 772*   AST U/L 288*   GLUCOSE RANDOM mg/dL 138           Results from last 7 days  Lab Units 04/18/18  0549   LIPASE u/L 164       Imaging Studies: I have personally reviewed pertinent imaging studies        Ct Abdomen Pelvis With Contrast  Result Date: 4/18/2018  Impression: Severe chronic gastritis with mild active inflammation of the gastric cardia and GE junction  Adjacent shotty reactive lymph nodes are present  No emphysematous gastritis, free gas, or abscess  Prominent soft tissue tract extending from the medial inferior gastric cardia inferiorly towards the posterior superior distal pancreatic body as seen on images 79 through 81 series 602  This is in the area of prior severe active inflammation and attributed to scarring  No fluid or gaseous fistulous tract  Stable hepatomegaly  Trace new multifocal patchy groundglass opacities right middle lobe and right lung base attributed to scarring  The study was marked in [facility] for immediate notification          Assessment and Plan:     Transaminitis  Hyperbilirubinemia  - Suspect ischemic hepatitis v DILI due to significant elevation of transaminases on admission with rapid improvement  - Hepatitis panel negative, tylenol/salicylate/alcohol level neg on admission  - Follow up DEANNA and anti-smooth muscle ab for autoimmune hepatitis though this is less likely  - Okay to restart statin and fenofibrate; pt may need increase in fenofibrate due to trigylcerides of 754 noted on admission  - Hold lorzone due to listed hepatotoxicity as adverse effect  - Follow up with pain management to discuss other options if needed  - Pt again educated on important of abstinence from alcohol, even occasional intake due to his history        Upper Abdominal Pain  Chronic Pancreatitis  - CT on admission shows 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; severe gastritis up to the GE junction, slightly prominent soft tissue from the gastric cardia towards the distal pancreatic body of unclear etiology and could represent scarring from prior pancreatitis  - EGD yesterday  - Pt will need outpatient EUS to evaluate chronic gastritis noted on CT as this may be external and to evaluate persistent lymphadenopathy at the GE junction and gastrohepatic LN  - Continue low fat diet for chronic pancreatitis  - Continue pancrelipase supplements  - Follow up with pain management        Chronic Gastritis/Esophagitis  Abnormal CT scan  - EGD yesterday showed normal esophagus, normal GE junction, gastritis in the stomach  - Follow up biopsies from stomach and duodenum  - Continue protonix 40 mg BID  - Outpatient EUS as above  - Avoid NSAIDs      The patient's care will be discussed with Dr Maria Luz Grajeda   The patient is stable from a GI standpoint

## 2018-04-20 NOTE — PLAN OF CARE
Discharge to home or other facility with appropriate resources Adequate for Discharge      Maintains or returns to baseline bowel function Adequate for Discharge      Absence or prevention of progression during hospitalization Adequate for Discharge      Absence of fever/infection during neutropenic period Adequate for Discharge      Patient/family/caregiver demonstrates understanding of disease process, treatment plan, medications, and discharge instructions Adequate for Discharge      Verbalizes/displays adequate comfort level or baseline comfort level Adequate for Discharge      Patient will remain free of falls Adequate for Discharge      Patient will remain free of falls Adequate for Discharge      Maintain or return to baseline ADL function Adequate for Discharge      Maintain or return mobility status to optimal level Adequate for Discharge

## 2018-04-20 NOTE — ASSESSMENT & PLAN NOTE
Likely secondary to acute gastritis  CT of the abdomen showed Severe chronic gastritis with mild active inflammation of the gastric cardia and GE junction with some reactive lymphadenopathy  EGD showed diffuse gastritis  Continue Protonix and reflex precaution  Patient to follow up with Gastroenterology for endoscopic ultrasound as outpatient  Patient was discharged on oxycodone for 5 days

## 2018-04-20 NOTE — ASSESSMENT & PLAN NOTE
Patient has elevated triglycerides, in 7 100s  Statin and fenofibrate were initially held while in the hospital secondary to increased transaminases but were later resumed on discharge

## 2018-04-20 NOTE — ASSESSMENT & PLAN NOTE
Patient had elevated transaminases and bilirubin  May be related to drug-induced versus ischemic origin  Blood Tylenol level negative  Acute hepatitis panel negative  Likely causes drug induced(Lorzone) versus ischemic  Christa Don was discontinued on discharge in patient is recommended to follow up with his pain management for other options      Liver enzymes trending down, continue to monitor CMP

## 2018-04-20 NOTE — DISCHARGE SUMMARY
Discharge- Suly Villarreal 1975, 37 y o  male MRN: [id number]    Unit/Bed#: -01 Encounter: [id number]    Primary Care Provider: Paige Moulton MD   Date and time admitted to hospital: 4/18/2018  5:21 AM        * Abdominal pain   Assessment & Plan    Likely secondary to acute gastritis  CT of the abdomen showed Severe chronic gastritis with mild active inflammation of the gastric cardia and GE junction with some reactive lymphadenopathy  EGD showed diffuse gastritis  Continue Protonix and reflex precaution  Patient to follow up with Gastroenterology for endoscopic ultrasound as outpatient  Patient was discharged on oxycodone for 5 days  Elevated liver enzymes   Assessment & Plan    Patient had elevated transaminases and bilirubin  May be related to drug-induced versus ischemic origin  Blood Tylenol level negative  Acute hepatitis panel negative  Likely causes drug induced(Lorzone) versus ischemic  Briseyda Slider was discontinued on discharge in patient is recommended to follow up with his pain management for other options      Liver enzymes trending down, continue to monitor CMP        Acute gastritis without hemorrhage   Assessment & Plan    Continue PPI  EGD showed gastritis        Hypertriglyceridemia   Assessment & Plan    Patient has elevated triglycerides, in 7 100s  Statin and fenofibrate were initially held while in the hospital secondary to increased transaminases but were later resumed on discharge        Smoker   Assessment & Plan    Nicotine patch  Counseled on smoking cessation              Discharging Physician / Practitioner: Mercedes Francisco MD  PCP: Paige Moulton MD  Admission Date:   Admission Orders     Ordered        04/18/18 6472  Inpatient Admission (expected length of stay for this patient is greater than two midnights)  Once             Discharge Date: 04/20/18    Resolved Problems  Date Reviewed: 4/20/2018    None          Consultations During [facility] Stay:  Gastroenterology    Procedures Performed:     EGD-showed gastritis    Significant Findings / Test Results:   CT of the abdomen  Severe chronic gastritis with mild active inflammation of the gastric cardia and GE junction  Adjacent shotty reactive lymph nodes are present  No emphysematous gastritis, free gas, or abscess      Prominent soft tissue tract extending from the medial inferior gastric cardia inferiorly towards the posterior superior distal pancreatic body as seen on images 79 through 81 series 602  This is in the area of prior severe active inflammation and   attributed to scarring  No fluid or gaseous fistulous tract      Stable hepatomegaly      Trace new multifocal patchy groundglass opacities right middle lobe and right lung base attributed to scarring  Incidental Findings:   · None    Test Results Pending at Discharge (will require follow up): Biopsy results/ EGD     Outpatient Tests Requested:  None    Complications:  None    Reason for Admission:  Abdominal pain  Hospital Course:     Oral Jackson is a 37 y o  male patient past medical history of familial hypertriglyceridemia, history of severe pancreatitis with persistent pseudocyst requiring assisted gastrostomy, chronic pancreatitis seen by pain management with recurrent nerve blocks who originally presented to the hospital on 4/18/2018 due to complaints of acute onset abdominal pain associated with nausea and vomiting  He also admitted to have had lightheadedness/dizziness over the past few weeks  On admission patient had elevated transaminitis in 2000's with elevated T bili as well  Tylenol, sats light and met all call level negative on admission  CT of the abdomen showed hepatomegaly, no hepatic lesions, no biliary dilatation, hypoplastic pancreatic tail versus severe fatty infiltration and severe gastritis up to GE junction    Patient was evaluated by Gastroenterology as well, later underwent upper endoscopy which showed severe gastritis in the antrum  Patient was placed on Protonix and pain medications which improved clinically  Patient did have some pain with diet  He is recommended to continue Protonix and make an outpatient appointment with Gastroenterology for endoscopic ultrasound  He was prescribed with 20 tablets of oxycodone for pain  He also had elevated transaminases, which were trended and improved slowly this is likely believed secondary to ischemic/drug induced  Patient was on [address] for muscle spasms which could have contributed to that  Lower zone was discontinued and patient is recommended to follow up with pain management for alternative options  His triglycerides were checked and elevated as well, recommended to continue home medications  Patient was clinically stable for discharge  Please see above list of diagnoses and related plan for additional information  Condition at Discharge: stable     Discharge Day Visit / Exam:     Subjective:  Patient seen and examined  He feels better overall  Had some nausea this morning felt better with Zofran  Has intermittent abdominal pain related to food    Vitals: Blood Pressure: 150/91 (04/20/18 0700)  Pulse: 64 (04/20/18 0700)  Temperature: 97 6 °F (36 4 °C) (04/20/18 0700)  Temp Source: Oral (04/20/18 0700)  Respirations: 17 (04/20/18 0700)  Height: 5' 11" (180 3 cm) (04/18/18 1104)  Weight - Scale: 87 9 kg (193 lb 12 6 oz) (04/18/18 1104)  SpO2: 94 % (04/20/18 0700)     Exam:   Physical Exam   Constitutional: He is oriented to person, place, and time  He appears well-developed and well-nourished  HENT:   Head: Normocephalic and atraumatic  Eyes: EOM are normal  Pupils are equal, round, and reactive to light  Neck: Normal range of motion  Neck supple  Cardiovascular: Normal rate and regular rhythm  Pulmonary/Chest: Effort normal and breath sounds normal    Abdominal: Soft  Bowel sounds are normal  There is tenderness     Musculoskeletal: Normal range of motion  Neurological: He is alert and oriented to person, place, and time  Skin: Skin is warm and dry  Vitals reviewed  Discussion with Family:  Patient    Discharge instructions/Information to patient and family:   See after visit summary for information provided to patient and family  Provisions for Follow-Up Care:  See after visit summary for information related to follow-up care and any pertinent home health orders  Disposition:     Home    For Discharges to [address] [facility]:   · Not Applicable to this Patient - Not Applicable to this Patient    Planned Readmission:  none     Discharge Statement:  I spent 25 minutes discharging the patient  This time was spent on the day of discharge  I had direct contact with the patient on the day of discharge  Greater than 50% of the total time was spent examining patient, answering all patient questions, arranging and discussing plan of care with patient as well as directly providing post-discharge instructions  Additional time then spent on discharge activities  Discharge Medications:  See after visit summary for reconciled discharge medications provided to patient and family        ** Please Note: This note has been constructed using a voice recognition system **

## 2018-04-23 ENCOUNTER — TRANSITIONAL CARE MANAGEMENT (OUTPATIENT)
Dept: FAMILY MEDICINE CLINIC | Facility: CLINIC | Age: 43
End: 2018-04-23

## 2018-04-23 NOTE — CASE MANAGEMENT
Notification of Discharge  This is a Notification of Discharge from our facility [address]  Please be advised that this patient has been discharge from our facility  Below you will find the admission and discharge date and time including the patients disposition  PRESENTATION DATE: 4/18/2018  5:21 AM  IP ADMISSION DATE: 4/18/18 0952  DISCHARGE DATE: 4/20/2018  1:17 PM  DISPOSITION: [id number] in the [employer] [facility] [employer] [place] for 2017  Network Utilization Review Department  Phone: [phone]; Fax [phone]  ATTENTION: The Network Utilization Review Department is now centralized for our 7 Facilities  Make a note that we have a new phone and fax numbers for our Department  Please call with any questions or concerns to [phone] and carefully follow the prompts so that you are directed to the right person  All voicemails are confidential  Fax any determinations, approvals, denials, and requests for initial or continue stay review clinical to [phone]  Due to HIGH CALL volume, it would be easier if you could please send faxed requests to expedite your requests and in part, help us provide discharge notifications faster

## 2018-04-24 ENCOUNTER — TELEPHONE (OUTPATIENT)
Dept: GASTROENTEROLOGY | Facility: CLINIC | Age: 43
End: 2018-04-24

## 2018-04-24 DIAGNOSIS — E78.2 MIXED HYPERLIPIDEMIA: ICD-10-CM

## 2018-04-26 RX ORDER — CHLORZOXAZONE 750 MG/1
TABLET ORAL
Status: ON HOLD | COMMUNITY
Start: 2017-12-26 | End: 2018-05-08

## 2018-04-26 RX ORDER — FENOFIBRIC ACID 135 MG/1
1 CAPSULE, DELAYED RELEASE ORAL DAILY
COMMUNITY
Start: 2016-09-06 | End: 2019-01-10 | Stop reason: SDUPTHER

## 2018-04-26 RX ORDER — HYDROMORPHONE HYDROCHLORIDE 2 MG/1
2 TABLET ORAL EVERY 4 HOURS
Status: ON HOLD | COMMUNITY
Start: 2015-08-04 | End: 2018-05-08

## 2018-04-26 RX ORDER — TRAMADOL HYDROCHLORIDE 50 MG/1
TABLET ORAL 2 TIMES DAILY
Status: ON HOLD | COMMUNITY
Start: 2017-12-01 | End: 2018-05-08

## 2018-04-26 RX ORDER — ASPIRIN 325 MG
1 TABLET ORAL DAILY
Status: ON HOLD | COMMUNITY
End: 2018-05-08

## 2018-05-01 ENCOUNTER — OFFICE VISIT (OUTPATIENT)
Dept: GASTROENTEROLOGY | Facility: CLINIC | Age: 43
End: 2018-05-01
Payer: COMMERCIAL

## 2018-05-01 VITALS
WEIGHT: 199.38 LBS | BODY MASS INDEX: 27.81 KG/M2 | DIASTOLIC BLOOD PRESSURE: 86 MMHG | HEART RATE: 76 BPM | SYSTOLIC BLOOD PRESSURE: 122 MMHG

## 2018-05-01 DIAGNOSIS — G47.00 INSOMNIA, UNSPECIFIED TYPE: Primary | ICD-10-CM

## 2018-05-01 DIAGNOSIS — K29.70 GASTRITIS, PRESENCE OF BLEEDING UNSPECIFIED, UNSPECIFIED CHRONICITY, UNSPECIFIED GASTRITIS TYPE: Primary | ICD-10-CM

## 2018-05-01 DIAGNOSIS — K86.1 CHRONIC PANCREATITIS, UNSPECIFIED PANCREATITIS TYPE (HCC): ICD-10-CM

## 2018-05-01 DIAGNOSIS — R74.01 TRANSAMINITIS: ICD-10-CM

## 2018-05-01 PROCEDURE — 99213 OFFICE O/P EST LOW 20 MIN: CPT | Performed by: PHYSICIAN ASSISTANT

## 2018-05-01 PROCEDURE — 1111F DSCHRG MED/CURRENT MED MERGE: CPT | Performed by: PHYSICIAN ASSISTANT

## 2018-05-01 RX ORDER — ONDANSETRON 4 MG/1
4 TABLET, ORALLY DISINTEGRATING ORAL EVERY 8 HOURS PRN
Qty: 20 TABLET | Refills: 0 | Status: SHIPPED | OUTPATIENT
Start: 2018-05-01 | End: 2018-08-30 | Stop reason: SDUPTHER

## 2018-05-01 RX ORDER — TRAZODONE HYDROCHLORIDE 50 MG/1
50 TABLET ORAL
Qty: 30 TABLET | Refills: 3 | Status: SHIPPED | OUTPATIENT
Start: 2018-05-01 | End: 2018-12-18 | Stop reason: SDUPTHER

## 2018-05-01 NOTE — PROGRESS NOTES
[facility] - Outpatient Follow-up Note  Destiny Monge 37 y o  male MRN: [id number]  Encounter: [id number]          ASSESSMENT AND PLAN:      1  Transaminitis  - Suspect this is 2/2 ischemic hepatitis as side effect from meds v DILI from [address] has since been discontinued  - Will order repeat LFTs today; AST was 288 and  most recently on 4/20    2  Epigastric Pain  - This could be 2/2 chronic pancreatitis v acute gastritis noted on CT  - Continue protonix 40 mg BID; he is also taking zantac 150 mg BID and PRN maalox for now  - He is seeing pain management tomorrow to discuss repeat celiac plexus block    3  Severe Gastritis and Persistent LAD at GE junction  - EGD on 4/19 showed normal esophagus, normal GE junction, gastritis, normal duodenum; biopsies were negative  - Will schedule for EUS in the next month to evaluate the external stomach wall and peristent LAD at the GE junction and gastrohepatic LN    4  Chronic Pancreatitis  - Continue creon supplements  - Pt advised regarding smoking cessation and abstinence from alcohol  - Follow up with pain management  - Pending repeat LFTs, will get either contrasted MRI abdomen v MRI/MRCP to evalute the soft tissue tract noted on CT from the gastric cardia to the distal pancreatic body      Follow up pending repeat LFTs and MRI     ______________________________________________________________________    SUBJECTIVE:  Gaurang Silver is a 38 yo M with a significant PMH of severe pancreatitis due to hypertriglyceridemia and alcohol complicated by pseudocyst years ago requiring cyst gastrostomy and subsequent development of liver abscess, presenting for hospital follow up due to epigastric pain and transaminitis  He had a significant transaminitis of almost 3000 which was likely secondary to ischemic hepatitis or drug-induced liver injury    Following the hospital though liver numbers decreased dramatically with his last blood work on 04/20 showing an AST of 288 and ALT of 722  It was suspected that the medication llorzone could have caused this is a does have side effects of hepatotoxicity and drowsiness and he was very dizzy, lightheaded  He feels much better since stopping lorzone altogether  He is seeing Dr Ashley Mccracken tomorrow to discuss any other pain management options as well as a possible repeat nerve block for chronic pancreatitis pain  He had an EGD as well in the hospital due to severe chronic gastritis and lymphadenopathy noted on CT which was also present on CT in November  He had gastritis but it was not severe so he is being set up for an EUS as outpatient  Overall he feels better  He is still having epigastric pain but it is improved  He denies nausea, vomiting, hematemesis, melena, or hematochezia  He is able to tolerate PO and his weight is stable  REVIEW OF SYSTEMS IS OTHERWISE NEGATIVE  Historical Information   Past Medical History:   Diagnosis Date    Asthma     Chronic pain disorder     GERD (gastroesophageal reflux disease)     Hyperlipidemia     Liver abscess     Meniscus tear     Pancreatitis      Past Surgical History:   Procedure Laterality Date    CHOLECYSTECTOMY      ESOPHAGOGASTRODUODENOSCOPY N/A 11/16/2017    Procedure: ESOPHAGOGASTRODUODENOSCOPY (EGD); Surgeon: To Garcia MD;  Location: [facility]; Service: Gastroenterology    ESOPHAGOGASTRODUODENOSCOPY N/A 4/19/2018    Procedure: ESOPHAGOGASTRODUODENOSCOPY (EGD); Surgeon: Nellie Silva MD;  Location: [facility];   Service: Gastroenterology    KNEE ARTHROSCOPY Bilateral     LIVER SURGERY      PANCREAS SURGERY      stents    WA INJECT NERV BLCK,CELIAC PLEXUS Bilateral 5/9/2017    Procedure: CELIAC PLEXUS BLOCK ;  Surgeon: Syeda Duvall MD;  Location: [facility];  Service: Pain Management     WA INJECT NERV BLCK,CELIAC PLEXUS Bilateral 6/1/2017    Procedure: SPLANCHNIC NERVE BLOCK at T12;  Surgeon: Syeda Duvall MD;  Location: [facility];  Service: Pain Management     GA INJECT NERV BLCK,CELIAC PLEXUS Bilateral 8/8/2017    Procedure: BILATERAL SPLANCHNIC NERVE BLOCK T12;  Surgeon: Barrett Briceño MD;  [facility]: [facility];  Service: Pain Management     GA INJECT NERV BLCK,PARAVERT SYMPATH Bilateral 12/28/2017    Procedure: SPLANCHNIC NERVE BLOCK;  Surgeon: Barrett Briceño MD;  [facility];  Service: Pain Management     GA LAP,CHOLECYSTECTOMY N/A 2/14/2017    Procedure: LAPAROSCOPIC CHOLECYSTECTOMY, IOC, POSSIBLE OPEN ;  Surgeon: Isabella Wild MD;  Location: [facility];  Service: General    ROTATOR CUFF REPAIR Right     SHOULDER ARTHROSCOPY      SHOULDER ARTHROSCOPY Right      Social History   History   Alcohol Use    Yes     Comment: rarely     History   Drug Use No     History   Smoking Status    Current Every Day Smoker    Packs/day: 0 50    Years: 20 00   Smokeless Tobacco    Never Used     Family History   Problem Relation Age of Onset    Cirrhosis Mother        Meds/Allergies       Current Outpatient Prescriptions:     albuterol (PROVENTIL HFA,VENTOLIN HFA) 90 mcg/act inhaler    aspirin 325 mg tablet    Chlorzoxazone (LORZONE) 750 MG TABS    choline fenofibrate (TRILIPIX) 135 MG capsule    EPINEPHrine (EPIPEN 2-ARIEL) 0 3 mg/0 3 mL SOAJ    FLAXSEED, LINSEED, PO    niacin (NIASPAN) 500 mg CR tablet    omega-3-acid ethyl esters (LOVAZA) 1 g capsule    ondansetron (ZOFRAN-ODT) 4 mg disintegrating tablet    pancrelipase, Lip-Prot-Amyl, (CREON) 12,000 units capsule    Pancrelipase, Lip-Prot-Amyl, (CREON) 12418 units CPEP    pantoprazole (PROTONIX) 40 mg tablet    rosuvastatin (CRESTOR) 20 MG tablet    traZODone (DESYREL) 50 mg tablet    Choline Fenofibrate (FENOFIBRIC ACID) 135 MG CPDR    HYDROmorphone (DILAUDID) 2 mg tablet    traMADol (ULTRAM) 50 mg tablet  No current facility-administered medications for this visit       Facility-Administered Medications Ordered in Other Visits:   Elizabeth Severe (PERCOCET) 5-325 mg per tablet 2 tablet, 2 tablet, Oral, Once    Allergies   Allergen Reactions    Bee Venom Swelling           Objective     Blood pressure 122/86, pulse 76, weight 90 4 kg (199 lb 6 oz)  Body mass index is 27 81 kg/m²  PHYSICAL EXAM:      General Appearance:   Alert, cooperative, no distress   HEENT:   Normocephalic, atraumatic, anicteric      Neck:  Supple, symmetrical, trachea midline   Lungs:   Clear to auscultation bilaterally; no rales, rhonchi or wheezing; respirations unlabored    Heart[de-identified]   Regular rate and rhythm; no murmur, rub, or gallop  Abdomen:   Soft, non-tender, non-distended; normal bowel sounds; no masses, no organomegaly    Rectal:   Deferred    Extremities:  No cyanosis, clubbing or edema    Pulses:  2+ and symmetric    Skin:  No jaundice, rashes, or lesions    Lymph nodes:  No palpable cervical lymphadenopathy        Lab Results:   No visits with results within 1 Day(s) from this visit     Latest known visit with results is:   Admission on 04/18/2018, Discharged on 04/20/2018   Component Date Value    WBC 04/18/2018 2 22*    RBC 04/18/2018 4 43     Hemoglobin 04/18/2018 14 9     Hematocrit 04/18/2018 40 5     MCV 04/18/2018 91     MCH 04/18/2018 33 6     MCHC 04/18/2018 36 8     RDW 04/18/2018 11 9     MPV 04/18/2018 10 7     Platelets 44/94/2094 153     nRBC 04/18/2018 0     Neutrophils Relative 04/18/2018 63     Lymphocytes Relative 04/18/2018 26     Monocytes Relative 04/18/2018 7     Eosinophils Relative 04/18/2018 3     Basophils Relative 04/18/2018 1     Neutrophils Absolute 04/18/2018 1 41*    Lymphocytes Absolute 04/18/2018 0 57*    Monocytes Absolute 04/18/2018 0 15*    Eosinophils Absolute 04/18/2018 0 06     Basophils Absolute 04/18/2018 0 02     Sodium 04/18/2018 135*    Potassium 04/18/2018 5 9*    Chloride 04/18/2018 103     CO2 04/18/2018 22     Anion Gap 04/18/2018 10     BUN 04/18/2018 9     Creatinine 04/18/2018 0 66     Glucose 04/18/2018 122     Calcium 04/18/2018 8 9     AST 04/18/2018 2947*    ALT 04/18/2018 2280*    Alkaline Phosphatase 04/18/2018 108     Total Protein 04/18/2018 7 1     Albumin 04/18/2018 3 0*    Total Bilirubin 04/18/2018 1 74*    eGFR 04/18/2018 117     Lipase 04/18/2018 164     Color, UA 04/18/2018 Yellow     Clarity, UA 04/18/2018 Clear     Specific Gravity, UA 04/18/2018 1 010     pH, UA 04/18/2018 7 5     Leukocytes, UA 04/18/2018 Negative     Nitrite, UA 04/18/2018 Negative     Protein, UA 04/18/2018 Negative     Glucose, UA 04/18/2018 Negative     Ketones, UA 04/18/2018 Negative     Urobilinogen, UA 04/18/2018 0 2     Bilirubin, UA 04/18/2018 Negative     Blood, UA 04/18/2018 Negative     Hepatitis B Surface Ag 04/18/2018 Non-reactive     Hep A IgM 04/18/2018 Non-reactive     Hepatitis C Ab 04/18/2018 Non-reactive     Hep B C IgM 04/18/2018 Non-reactive     Ethanol Lvl 74/66/1269 <3     Salicylate Lvl 60/14/8369 <3*    Acetaminophen Level 04/18/2018 <2 0*    Total CK 04/18/2018 126     Bilirubin, Direct 04/18/2018 0 22*    WBC 04/19/2018 3 14*    RBC 04/19/2018 4 17     Hemoglobin 04/19/2018 13 3     Hematocrit 04/19/2018 38 5     MCV 04/19/2018 92     MCH 04/19/2018 31 9     MCHC 04/19/2018 34 5     RDW 04/19/2018 12 3     MPV 04/19/2018 10 5     Platelets 20/31/9424 131*    nRBC 04/19/2018 0     Neutrophils Relative 04/19/2018 47     Lymphocytes Relative 04/19/2018 38     Monocytes Relative 04/19/2018 11     Eosinophils Relative 04/19/2018 4     Basophils Relative 04/19/2018 1     Neutrophils Absolute 04/19/2018 1 47*    Lymphocytes Absolute 04/19/2018 1 19     Monocytes Absolute 04/19/2018 0 33     Eosinophils Absolute 04/19/2018 0 11     Basophils Absolute 04/19/2018 0 02     Sodium 04/19/2018 133*    Potassium 04/19/2018 3 6     Chloride 04/19/2018 100     CO2 04/19/2018 25     Anion Gap 04/19/2018 8     BUN 04/19/2018 5     Creatinine 04/19/2018 0 62     Glucose 04/19/2018 86     Calcium 04/19/2018 8 4     AST 04/19/2018 753*    ALT 04/19/2018 1231*    Alkaline Phosphatase 04/19/2018 68     Total Protein 04/19/2018 6 4     Albumin 04/19/2018 3 2*    Total Bilirubin 04/19/2018 1 40*    eGFR 04/19/2018 120     Cholesterol 04/19/2018 183     Triglycerides 04/19/2018 784*    HDL, Direct 04/19/2018 13*    LDL Calculated 04/19/2018      Non-HDL-Chol (CHOL-HDL) 04/19/2018 170     LD 04/19/2018 179     DEANNA 04/19/2018 Negative     Smooth Muscle Ab 04/19/2018 7     Case Report 04/19/2018                      Value:Surgical Pathology Report                         Case: [id number]                                   Authorizing Provider:  Benita George MD           Collected:           04/19/2018 1540              Ordering Location:     [address] [place] [address] Received:            04/19/2018 1623                                     Operating Room                                                               Pathologist:           Pinky Allison MD                                                         Specimens:   A) - Duodenum                                                                                       B) - Stomach                                                                               Final Diagnosis 04/19/2018                      Value: This result contains rich text formatting which cannot be displayed here   Additional Information 04/19/2018                      Value: This result contains rich text formatting which cannot be displayed here  Levora Weber Gross Description 04/19/2018                      Value: This result contains rich text formatting which cannot be displayed here      Clinical Information 04/19/2018                      Value:Cold bx r/o c sprue    Sodium 04/20/2018 137     Potassium 04/20/2018 3 6     Chloride 04/20/2018 101     CO2 04/20/2018 31     Anion Gap 04/20/2018 5     BUN 04/20/2018 7  Creatinine 04/20/2018 0 75     Glucose 04/20/2018 138     Calcium 04/20/2018 8 7     AST 04/20/2018 288*    ALT 04/20/2018 772*    Alkaline Phosphatase 04/20/2018 86     Total Protein 04/20/2018 6 6     Albumin 04/20/2018 2 6*    Total Bilirubin 04/20/2018 0 90     eGFR 04/20/2018 111          Radiology Results:   Ct Abdomen Pelvis With Contrast    Result Date: 4/18/2018  Narrative: CT ABDOMEN AND PELVIS WITH IV CONTRAST INDICATION:   abd pain  COMPARISON: CT abdomen and pelvis 11/15/2017 TECHNIQUE:  CT examination of the abdomen and pelvis was performed  Axial, sagittal, and coronal 2D reformatted images were created from the source data and submitted for interpretation  Radiation dose length product (DLP) for this visit:  687 mGy-cm   This examination, like all CT scans performed in the [facility], was performed utilizing techniques to minimize radiation dose exposure, including the use of iterative reconstruction and automated exposure control  IV Contrast:  100 mL of iohexol (OMNIPAQUE)  350 Enteric Contrast:  Enteric contrast was not administered  FINDINGS: ABDOMEN LOWER CHEST:  Trace patchy groundglass opacities in the right lung base and right middle lobe attributed to new focal scarring  LIVER/BILIARY TREE:  Liver is enlarged  Right lobe of liver measures 24 cm craniocaudal   No significant interval change  Mild atrophy of the left lobe  No focal hepatic lesions are noted  No biliary dilatation  GALLBLADDER:  Gallbladder is surgically absent  SPLEEN:  Unremarkable  PANCREAS:  Hypoplastic tail of the pancreas versus severe fat infiltration  No change  ADRENAL GLANDS:  Unremarkable  KIDNEYS/URETERS:  Unremarkable  No hydronephrosis  STOMACH AND BOWEL:  Severe nonspecific thickening of the stomach extending up to the gastric cardia and GE junction  Mild stranding adjacent to the gastric cardia and minimal thickening of the adjacent left diaphragmatic leodan   Slightly prominent elongated soft tissue extending from the medial inferior gastric cardia inferiorly towards the posterior superior distal pancreatic body as seen on images 79 through 81 series 602  This is in the area of prior severe active inflammation and attributed to scarring  No fluid or gaseous fistulous tract  No emphysematous gastritis or pneumatosis intestinalis  No bowel obstruction  APPENDIX:  A normal appendix was visualized  ABDOMINOPELVIC CAVITY:  Trace free fluid in the dependent pelvis  No free gas  Shotty lymph nodes adjacent to the GE junction and gastric cardia  Shotty gastrohepatic lymph nodes again noted  VESSELS:  Aortoiliac calcification  No aneurysm  Prominent collateral vessels in the ventral mid and left upper abdomen  Recanalized umbilical vein is noted  Small perigastric and perisplenic varices  PELVIS REPRODUCTIVE ORGANS:  Unremarkable for patient's age  URINARY BLADDER:  Unremarkable  ABDOMINAL WALL/INGUINAL REGIONS: Subcentimeter ventral umbilical abdominal wall diastases containing fat  No bowel herniation  Tiny bilateral fat-containing inguinal hernias  No inguinal mass  OSSEOUS STRUCTURES:  No acute fracture or osseous destructive lesion identified  Mild degenerative changes of the spine and bilateral hips  Stable bilateral femoral head AVN     Impression: Severe chronic gastritis with mild active inflammation of the gastric cardia and GE junction  Adjacent shotty reactive lymph nodes are present  No emphysematous gastritis, free gas, or abscess  Prominent soft tissue tract extending from the medial inferior gastric cardia inferiorly towards the posterior superior distal pancreatic body as seen on images 79 through 81 series 602  This is in the area of prior severe active inflammation and attributed to scarring  No fluid or gaseous fistulous tract  Stable hepatomegaly  Trace new multifocal patchy groundglass opacities right middle lobe and right lung base attributed to scarring  The study was marked in [facility] for immediate notification

## 2018-05-01 NOTE — LETTER
May 1, 2018     Marydouglas Reynolds, [address]    Patient: Delma Pandey   YOB: 1975   Date of Visit: 5/1/2018       Dear Dr Toney Organ: Thank you for referring Demla Pandey to me for evaluation  Below are my notes for this consultation  If you have questions, please do not hesitate to call me  I look forward to following your patient along with you  Sincerely,        Kourtney Gamboa PA-C        CC: No Recipients  Lauren Connell  5/1/2018  9:14 AM  Sign at close encounter  [facility] - Outpatient Follow-up Note  Delma Pandey 37 y o  male MRN: [id number]  Encounter: [id number]          ASSESSMENT AND PLAN:      1  Transaminitis  - Suspect this is 2/2 ischemic hepatitis as side effect from meds v DILI from [address] has since been discontinued  - Will order repeat LFTs today; AST was 288 and  most recently on 4/20    2  Epigastric Pain  - This could be 2/2 chronic pancreatitis v acute gastritis noted on CT  - Continue protonix 40 mg BID; he is also taking zantac 150 mg BID and PRN maalox for now  - He is seeing pain management tomorrow to discuss repeat celiac plexus block    3  Severe Gastritis and Persistent LAD at GE junction  - EGD on 4/19 showed normal esophagus, normal GE junction, gastritis, normal duodenum; biopsies were negative  - Will schedule for EUS in the next month to evaluate the external stomach wall and peristent LAD at the GE junction and gastrohepatic LN    4   Chronic Pancreatitis  - Continue creon supplements  - Pt advised regarding smoking cessation and abstinence from alcohol  - Follow up with pain management  - Pending repeat LFTs, will get either contrasted MRI abdomen v MRI/MRCP to evalute the soft tissue tract noted on CT from the gastric cardia to the distal pancreatic body      Follow up pending repeat LFTs and MRI     ______________________________________________________________________    SUBJECTIVE:  Jh Lopez is a 36 yo M with a significant PMH of severe pancreatitis due to hypertriglyceridemia and alcohol complicated by pseudocyst years ago requiring cyst gastrostomy and subsequent development of liver abscess, presenting for hospital follow up due to epigastric pain and transaminitis  He had a significant transaminitis of almost 3000 which was likely secondary to ischemic hepatitis or drug-induced liver injury  Following the hospital though liver numbers decreased dramatically with his last blood work on 04/20 showing an AST of 288 and ALT of 722  It was suspected that the medication llorzone could have caused this is a does have side effects of hepatotoxicity and drowsiness and he was very dizzy, lightheaded  He feels much better since stopping lorzone altogether  He is seeing Dr Nicolas Gregorio tomorrow to discuss any other pain management options as well as a possible repeat nerve block for chronic pancreatitis pain  He had an EGD as well in the hospital due to severe chronic gastritis and lymphadenopathy noted on CT which was also present on CT in November  He had gastritis but it was not severe so he is being set up for an EUS as outpatient  Overall he feels better  He is still having epigastric pain but it is improved  He denies nausea, vomiting, hematemesis, melena, or hematochezia  He is able to tolerate PO and his weight is stable  REVIEW OF SYSTEMS IS OTHERWISE NEGATIVE  Historical Information   Past Medical History:   Diagnosis Date    Asthma     Chronic pain disorder     GERD (gastroesophageal reflux disease)     Hyperlipidemia     Liver abscess     Meniscus tear     Pancreatitis      Past Surgical History:   Procedure Laterality Date    CHOLECYSTECTOMY      ESOPHAGOGASTRODUODENOSCOPY N/A 11/16/2017    Procedure: ESOPHAGOGASTRODUODENOSCOPY (EGD);   Surgeon: Marco Molina MD;  Location: [facility]; Service: Gastroenterology    ESOPHAGOGASTRODUODENOSCOPY N/A 4/19/2018    Procedure: ESOPHAGOGASTRODUODENOSCOPY (EGD); Surgeon: Arvind Haines MD;  Location: [facility];   Service: Gastroenterology    KNEE ARTHROSCOPY Bilateral     LIVER SURGERY      PANCREAS SURGERY      stents    MA INJECT NERV BLCK,CELIAC PLEXUS Bilateral 5/9/2017    Procedure: CELIAC PLEXUS BLOCK ;  Surgeon: Sacha Everett MD;  [facility];  Service: Pain Management     MA INJECT NERV BLCK,CELIAC PLEXUS Bilateral 6/1/2017    Procedure: SPLANCHNIC NERVE BLOCK at T12;  Surgeon: Sacha Everett MD;  [facility];  Service: Pain Management     MA INJECT NERV BLCK,CELIAC PLEXUS Bilateral 8/8/2017    Procedure: BILATERAL SPLANCHNIC NERVE BLOCK T12;  Surgeon: Sacha Everett MD;  [facility];  Service: Pain Management     MA INJECT NERV Mennie Sylvia Bilateral 12/28/2017    Procedure: SPLANCHNIC NERVE BLOCK;  Surgeon: Sacha Everett MD;  [facility];  Service: Pain Management     MA LAP,CHOLECYSTECTOMY N/A 2/14/2017    Procedure: LAPAROSCOPIC CHOLECYSTECTOMY, IOC, POSSIBLE OPEN ;  Surgeon: Tracey Galindo MD;  [facility];  Service: General    ROTATOR CUFF REPAIR Right     SHOULDER ARTHROSCOPY      SHOULDER ARTHROSCOPY Right      Social History   History   Alcohol Use    Yes     Comment: rarely     History   Drug Use No     History   Smoking Status    Current Every Day Smoker    Packs/day: 0 50    Years: 20 00   Smokeless Tobacco    Never Used     Family History   Problem Relation Age of Onset    Cirrhosis Mother        Meds/Allergies       Current Outpatient Prescriptions:     albuterol (PROVENTIL HFA,VENTOLIN HFA) 90 mcg/act inhaler    aspirin 325 mg tablet    Chlorzoxazone (LORZONE) 750 MG TABS    choline fenofibrate (TRILIPIX) 135 MG capsule    EPINEPHrine (EPIPEN 2-ARIEL) 0 3 mg/0 3 mL SOAJ    FLAXSEED, LINSEED, PO    niacin (NIASPAN) 500 mg CR tablet    omega-3-acid ethyl esters (LOVAZA) 1 g capsule    ondansetron (ZOFRAN-ODT) 4 mg disintegrating tablet    pancrelipase, Lip-Prot-Amyl, (CREON) 12,000 units capsule    Pancrelipase, Lip-Prot-Amyl, (CREON) 81549 units CPEP    pantoprazole (PROTONIX) 40 mg tablet    rosuvastatin (CRESTOR) 20 MG tablet    traZODone (DESYREL) 50 mg tablet    Choline Fenofibrate (FENOFIBRIC ACID) 135 MG CPDR    HYDROmorphone (DILAUDID) 2 mg tablet    traMADol (ULTRAM) 50 mg tablet  No current facility-administered medications for this visit  Facility-Administered Medications Ordered in Other Visits:     oxyCODONE-acetaminophen (PERCOCET) 5-325 mg per tablet 2 tablet, 2 tablet, Oral, Once    Allergies   Allergen Reactions    Bee Venom Swelling           Objective     Blood pressure 122/86, pulse 76, weight 90 4 kg (199 lb 6 oz)  Body mass index is 27 81 kg/m²  PHYSICAL EXAM:      General Appearance:   Alert, cooperative, no distress   HEENT:   Normocephalic, atraumatic, anicteric      Neck:  Supple, symmetrical, trachea midline   Lungs:   Clear to auscultation bilaterally; no rales, rhonchi or wheezing; respirations unlabored    Heart[de-identified]   Regular rate and rhythm; no murmur, rub, or gallop  Abdomen:   Soft, non-tender, non-distended; normal bowel sounds; no masses, no organomegaly    Rectal:   Deferred    Extremities:  No cyanosis, clubbing or edema    Pulses:  2+ and symmetric    Skin:  No jaundice, rashes, or lesions    Lymph nodes:  No palpable cervical lymphadenopathy        Lab Results:   No visits with results within 1 Day(s) from this visit     Latest known visit with results is:   Admission on 04/18/2018, Discharged on 04/20/2018   Component Date Value    WBC 04/18/2018 2 22*    RBC 04/18/2018 4 43     Hemoglobin 04/18/2018 14 9     Hematocrit 04/18/2018 40 5     MCV 04/18/2018 91     MCH 04/18/2018 33 6     MCHC 04/18/2018 36 8     RDW 04/18/2018 11 9     MPV 04/18/2018 10 7     Platelets 25/75/1435 153     nRBC 04/18/2018 0     Neutrophils Relative 04/18/2018 63     Lymphocytes Relative 04/18/2018 26     Monocytes Relative 04/18/2018 7     Eosinophils Relative 04/18/2018 3     Basophils Relative 04/18/2018 1     Neutrophils Absolute 04/18/2018 1 41*    Lymphocytes Absolute 04/18/2018 0 57*    Monocytes Absolute 04/18/2018 0 15*    Eosinophils Absolute 04/18/2018 0 06     Basophils Absolute 04/18/2018 0 02     Sodium 04/18/2018 135*    Potassium 04/18/2018 5 9*    Chloride 04/18/2018 103     CO2 04/18/2018 22     Anion Gap 04/18/2018 10     BUN 04/18/2018 9     Creatinine 04/18/2018 0 66     Glucose 04/18/2018 122     Calcium 04/18/2018 8 9     AST 04/18/2018 2947*    ALT 04/18/2018 2280*    Alkaline Phosphatase 04/18/2018 108     Total Protein 04/18/2018 7 1     Albumin 04/18/2018 3 0*    Total Bilirubin 04/18/2018 1 74*    eGFR 04/18/2018 117     Lipase 04/18/2018 164     Color, UA 04/18/2018 Yellow     Clarity, UA 04/18/2018 Clear     Specific Gravity, UA 04/18/2018 1 010     pH, UA 04/18/2018 7 5     Leukocytes, UA 04/18/2018 Negative     Nitrite, UA 04/18/2018 Negative     Protein, UA 04/18/2018 Negative     Glucose, UA 04/18/2018 Negative     Ketones, UA 04/18/2018 Negative     Urobilinogen, UA 04/18/2018 0 2     Bilirubin, UA 04/18/2018 Negative     Blood, UA 04/18/2018 Negative     Hepatitis B Surface Ag 04/18/2018 Non-reactive     Hep A IgM 04/18/2018 Non-reactive     Hepatitis C Ab 04/18/2018 Non-reactive     Hep B C IgM 04/18/2018 Non-reactive     Ethanol Lvl 44/53/4596 <3     Salicylate Lvl 76/79/4597 <3*    Acetaminophen Level 04/18/2018 <2 0*    Total CK 04/18/2018 126     Bilirubin, Direct 04/18/2018 0 22*    WBC 04/19/2018 3 14*    RBC 04/19/2018 4 17     Hemoglobin 04/19/2018 13 3     Hematocrit 04/19/2018 38 5     MCV 04/19/2018 92     MCH 04/19/2018 31 9     MCHC 04/19/2018 34 5     RDW 04/19/2018 12 3     MPV 04/19/2018 10 5     Platelets 04/19/2018 131*    nRBC 04/19/2018 0     Neutrophils Relative 04/19/2018 47     Lymphocytes Relative 04/19/2018 38     Monocytes Relative 04/19/2018 11     Eosinophils Relative 04/19/2018 4     Basophils Relative 04/19/2018 1     Neutrophils Absolute 04/19/2018 1 47*    Lymphocytes Absolute 04/19/2018 1 19     Monocytes Absolute 04/19/2018 0 33     Eosinophils Absolute 04/19/2018 0 11     Basophils Absolute 04/19/2018 0 02     Sodium 04/19/2018 133*    Potassium 04/19/2018 3 6     Chloride 04/19/2018 100     CO2 04/19/2018 25     Anion Gap 04/19/2018 8     BUN 04/19/2018 5     Creatinine 04/19/2018 0 62     Glucose 04/19/2018 86     Calcium 04/19/2018 8 4     AST 04/19/2018 753*    ALT 04/19/2018 1231*    Alkaline Phosphatase 04/19/2018 68     Total Protein 04/19/2018 6 4     Albumin 04/19/2018 3 2*    Total Bilirubin 04/19/2018 1 40*    eGFR 04/19/2018 120     Cholesterol 04/19/2018 183     Triglycerides 04/19/2018 784*    HDL, Direct 04/19/2018 13*    LDL Calculated 04/19/2018      Non-HDL-Chol (CHOL-HDL) 04/19/2018 170     LD 04/19/2018 179     DEANNA 04/19/2018 Negative     Smooth Muscle Ab 04/19/2018 7     Case Report 04/19/2018                      Value:Surgical Pathology Report                         Case: [id number]                                   Authorizing Provider:  Sia Patiño MD           Collected:           04/19/2018 1540              Ordering Location:     [address] [place] [address] Received:            04/19/2018 1623                                     Operating Room                                                               Pathologist:           Kimberly Hutton MD                                                         Specimens:   A) - Duodenum                                                                                       B) - Stomach                                                                               Final Diagnosis 04/19/2018 Value:This result contains rich text formatting which cannot be displayed here   Additional Information 04/19/2018                      Value: This result contains rich text formatting which cannot be displayed here  Tracy Castro Gross Description 04/19/2018                      Value: This result contains rich text formatting which cannot be displayed here   Clinical Information 04/19/2018                      Value:Cold bx r/o c sprue    Sodium 04/20/2018 137     Potassium 04/20/2018 3 6     Chloride 04/20/2018 101     CO2 04/20/2018 31     Anion Gap 04/20/2018 5     BUN 04/20/2018 7     Creatinine 04/20/2018 0 75     Glucose 04/20/2018 138     Calcium 04/20/2018 8 7     AST 04/20/2018 288*    ALT 04/20/2018 772*    Alkaline Phosphatase 04/20/2018 86     Total Protein 04/20/2018 6 6     Albumin 04/20/2018 2 6*    Total Bilirubin 04/20/2018 0 90     eGFR 04/20/2018 111          Radiology Results:   Ct Abdomen Pelvis With Contrast    Result Date: 4/18/2018  Narrative: CT ABDOMEN AND PELVIS WITH IV CONTRAST INDICATION:   abd pain  COMPARISON: CT abdomen and pelvis 11/15/2017 TECHNIQUE:  CT examination of the abdomen and pelvis was performed  Axial, sagittal, and coronal 2D reformatted images were created from the source data and submitted for interpretation  Radiation dose length product (DLP) for this visit:  687 mGy-cm   This examination, like all CT scans performed in the [facility], was performed utilizing techniques to minimize radiation dose exposure, including the use of iterative reconstruction and automated exposure control  IV Contrast:  100 mL of iohexol (OMNIPAQUE)  350 Enteric Contrast:  Enteric contrast was not administered  FINDINGS: ABDOMEN LOWER CHEST:  Trace patchy groundglass opacities in the right lung base and right middle lobe attributed to new focal scarring  LIVER/BILIARY TREE:  Liver is enlarged    Right lobe of liver measures 24 cm craniocaudal   No significant interval change  Mild atrophy of the left lobe  No focal hepatic lesions are noted  No biliary dilatation  GALLBLADDER:  Gallbladder is surgically absent  SPLEEN:  Unremarkable  PANCREAS:  Hypoplastic tail of the pancreas versus severe fat infiltration  No change  ADRENAL GLANDS:  Unremarkable  KIDNEYS/URETERS:  Unremarkable  No hydronephrosis  STOMACH AND BOWEL:  Severe nonspecific thickening of the stomach extending up to the gastric cardia and GE junction  Mild stranding adjacent to the gastric cardia and minimal thickening of the adjacent left diaphragmatic leodan  Slightly prominent elongated soft tissue extending from the medial inferior gastric cardia inferiorly towards the posterior superior distal pancreatic body as seen on images 79 through 81 series 602  This is in the area of prior severe active inflammation and attributed to scarring  No fluid or gaseous fistulous tract  No emphysematous gastritis or pneumatosis intestinalis  No bowel obstruction  APPENDIX:  A normal appendix was visualized  ABDOMINOPELVIC CAVITY:  Trace free fluid in the dependent pelvis  No free gas  Shotty lymph nodes adjacent to the GE junction and gastric cardia  Shotty gastrohepatic lymph nodes again noted  VESSELS:  Aortoiliac calcification  No aneurysm  Prominent collateral vessels in the ventral mid and left upper abdomen  Recanalized umbilical vein is noted  Small perigastric and perisplenic varices  PELVIS REPRODUCTIVE ORGANS:  Unremarkable for patient's age  URINARY BLADDER:  Unremarkable  ABDOMINAL WALL/INGUINAL REGIONS: Subcentimeter ventral umbilical abdominal wall diastases containing fat  No bowel herniation  Tiny bilateral fat-containing inguinal hernias  No inguinal mass  OSSEOUS STRUCTURES:  No acute fracture or osseous destructive lesion identified  Mild degenerative changes of the spine and bilateral hips    Stable bilateral femoral head AVN     Impression: Severe chronic gastritis with mild active inflammation of the gastric cardia and GE junction  Adjacent shotty reactive lymph nodes are present  No emphysematous gastritis, free gas, or abscess  Prominent soft tissue tract extending from the medial inferior gastric cardia inferiorly towards the posterior superior distal pancreatic body as seen on images 79 through 81 series 602  This is in the area of prior severe active inflammation and attributed to scarring  No fluid or gaseous fistulous tract  Stable hepatomegaly  Trace new multifocal patchy groundglass opacities right middle lobe and right lung base attributed to scarring  The study was marked in [facility] for immediate notification

## 2018-05-02 ENCOUNTER — OFFICE VISIT (OUTPATIENT)
Dept: PAIN MEDICINE | Facility: CLINIC | Age: 43
End: 2018-05-02
Payer: COMMERCIAL

## 2018-05-02 VITALS
HEART RATE: 80 BPM | RESPIRATION RATE: 18 BRPM | BODY MASS INDEX: 27.86 KG/M2 | SYSTOLIC BLOOD PRESSURE: 134 MMHG | WEIGHT: 199 LBS | HEIGHT: 71 IN | DIASTOLIC BLOOD PRESSURE: 80 MMHG

## 2018-05-02 DIAGNOSIS — K86.1 OTHER CHRONIC PANCREATITIS (HCC): Primary | ICD-10-CM

## 2018-05-02 DIAGNOSIS — G89.4 CHRONIC PAIN SYNDROME: ICD-10-CM

## 2018-05-02 PROCEDURE — 99214 OFFICE O/P EST MOD 30 MIN: CPT | Performed by: ANESTHESIOLOGY

## 2018-05-02 NOTE — PROGRESS NOTES
Assessment:  1  Other chronic pancreatitis ([facility] [address] )     2  Chronic pain syndrome         Plan: This is a 47-year-old male who presents today for follow-up office visit for management of chronic abdominal pain secondary to chronic pancreatitis  In the past, we have performed a bilateral splanchnic nerve block which provided greater than 90% pain relief  Patient reports pain relief which lasted for 4-5 months  Last procedure was back in December 2017  Abdominal pain has gradually returned  He was recently admitted for chronic abdominal pain and elevated LFTs  Patient states that Ayr Plaster was discontinued  He states that he would like to schedule repeat injection  Today, I will proceed forward and schedule him for bilateral splanchnic nerve block  Procedure will be done at the hospital  under mac sedation  I cautioned patient regarding using opiates for abdominal pain  This may cause worsening pain secondary to constipation  Complete risks and benefits including bleeding, infection, tissue reaction, nerve injury and allergic reaction were discussed  The approach was demonstrated using models and literature was provided  Verbal and written consent was obtained  My impressions and treatment recommendations were discussed in detail with the patient who verbalized understanding and had no further questions  Discharge instructions were provided  I personally saw and examined the patient and I agree with the above discussed plan of care  History of Present Illness:  Anastasiia East is a 37 y o  male who presents for a follow up office visit in regards to Abdominal Pain  The patients current symptoms include abdominal pain  He states that he was in the hospital recently for elevated LFTs  He was subsequently taken off lorzone as the GI doctor suspected that this was the cause of the elevation in his LFTs  He was given a short course of oxycodone 5mg - 20 tabs  He states that he is not taking it  He states that his pain is rated 7/10  He states that his pain is in the elkin-umbilical area  In the past, we have done a series of bilateral splanchnic nerve blocks which he states helped greater than 90% pain relief for 4-5 months  He has a bilateral splanchnic nerve block back in 12/28/17  He states that he would like a repeat injection  He continues to follow up with Dr Linda Dia  I have personally reviewed and/or updated the patient's past medical history, past surgical history, family history, social history, current medications, allergies, and vital signs today  Review of Systems   Respiratory: Negative for shortness of breath  Cardiovascular: Negative for chest pain  Gastrointestinal: Positive for abdominal pain  Negative for constipation, diarrhea, nausea and vomiting  Musculoskeletal: Negative for arthralgias, gait problem, joint swelling and myalgias  Skin: Negative for rash  Neurological: Negative for dizziness, seizures and weakness  All other systems reviewed and are negative  Patient Active Problem List   Diagnosis    Pancreatic lesion    Renal mass    Smoker    RBBB    Epigastric pain    Acute gastritis without hemorrhage    Abdominal pain    Elevated liver enzymes    Chronic pancreatitis (HCC)    Leukopenia    Hypertriglyceridemia       Past Medical History:   Diagnosis Date    Asthma     Chronic pain disorder     GERD (gastroesophageal reflux disease)     Hyperlipidemia     Liver abscess     Meniscus tear     Pancreatitis        Past Surgical History:   Procedure Laterality Date    CHOLECYSTECTOMY      ESOPHAGOGASTRODUODENOSCOPY N/A 11/16/2017    Procedure: ESOPHAGOGASTRODUODENOSCOPY (EGD); Surgeon: Alhaji Venegas MD;  Location: [facility]; Service: Gastroenterology    ESOPHAGOGASTRODUODENOSCOPY N/A 4/19/2018    Procedure: ESOPHAGOGASTRODUODENOSCOPY (EGD); Surgeon: Jeramy Barber MD;  Location: [facility];   Service: Gastroenterology    KNEE ARTHROSCOPY Bilateral     LIVER SURGERY      PANCREAS SURGERY      stents    LA INJECT NERV BLCK,CELIAC PLEXUS Bilateral 5/9/2017    Procedure: CELIAC PLEXUS BLOCK ;  Surgeon: Renzo Parker MD;  Location: [facility];  Service: Pain Management     LA INJECT NERV BLCK,CELIAC PLEXUS Bilateral 6/1/2017    Procedure: SPLANCHNIC NERVE BLOCK at T12;  Surgeon: Renzo Parker MD;  [facility];  Service: Pain Management     LA INJECT NERV BLCK,CELIAC PLEXUS Bilateral 8/8/2017    Procedure: BILATERAL SPLANCHNIC NERVE BLOCK T12;  Surgeon: Renzo Parker MD;  [facility];  Service: Pain Management     LA INJECT NERV Clovia Bilis Bilateral 12/28/2017    Procedure: SPLANCHNIC NERVE BLOCK;  Surgeon: Renzo Parker MD;  [facility];  Service: Pain Management     LA LAP,CHOLECYSTECTOMY N/A 2/14/2017    Procedure: LAPAROSCOPIC CHOLECYSTECTOMY, IOC, POSSIBLE OPEN ;  Surgeon: Jewel Espinal MD;  [facility];  Service: General    ROTATOR CUFF REPAIR Right     SHOULDER ARTHROSCOPY      SHOULDER ARTHROSCOPY Right        Family History   Problem Relation Age of Onset    Cirrhosis Mother        Social History     Occupational History    Not on file       Social History Main Topics    Smoking status: Current Every Day Smoker     Packs/day: 0 50     Years: 20 00    Smokeless tobacco: Never Used    Alcohol use Yes      Comment: rarely    Drug use: No    Sexual activity: Not on file       Current Outpatient Prescriptions on File Prior to Visit   Medication Sig    albuterol (PROVENTIL HFA,VENTOLIN HFA) 90 mcg/act inhaler Inhale 1-2 puffs every 4 (four) hours as needed for wheezing      aspirin 325 mg tablet Take 1 tablet by mouth daily    Chlorzoxazone (LORZONE) 750 MG TABS Take by mouth    Choline Fenofibrate (FENOFIBRIC ACID) 135 MG CPDR Take 1 capsule by mouth daily    choline fenofibrate (TRILIPIX) 135 MG capsule Take 1 capsule (135 mg total) by mouth daily    EPINEPHrine (EPIPEN 2-ARIEL) 0 3 mg/0 3 mL SOAJ EpiPen 2-Ariel 0 3 MG/0 3ML Injection Solution Auto-injector  INJECT 0 3ML INTRAMUSCULARLY AS DIRECTED  Quantity: 1;  Refills: 1      Toro Briceño ; Active  2 Solution Auto-injector Pen    FLAXSEED, LINSEED, PO Take 1,000 mg by mouth 3 (three) times a day    HYDROmorphone (DILAUDID) 2 mg tablet Take 2 mg by mouth every 4 (four) hours    niacin (NIASPAN) 500 mg CR tablet Take 1 tablet (500 mg total) by mouth daily with breakfast    omega-3-acid ethyl esters (LOVAZA) 1 g capsule Take 2 capsules (2 g total) by mouth 2 (two) times a day for 90 days    ondansetron (ZOFRAN-ODT) 4 mg disintegrating tablet Take 1 tablet (4 mg total) by mouth every 8 (eight) hours as needed for nausea or vomiting    pancrelipase, Lip-Prot-Amyl, (CREON) 12,000 units capsule Take by mouth    Pancrelipase, Lip-Prot-Amyl, (CREON) 03457 units CPEP Take 1 capsule (36,000 Units total) by mouth 3 (three) times a day before meals    pantoprazole (PROTONIX) 40 mg tablet Take 1 tablet (40 mg total) by mouth 2 (two) times a day    rosuvastatin (CRESTOR) 20 MG tablet Take 1 tablet (20 mg total) by mouth daily    traMADol (ULTRAM) 50 mg tablet Take by mouth Twice daily    traZODone (DESYREL) 50 mg tablet Take 1 tablet (50 mg total) by mouth daily at bedtime     Current Facility-Administered Medications on File Prior to Visit   Medication    oxyCODONE-acetaminophen (PERCOCET) 5-325 mg per tablet 2 tablet       Allergies   Allergen Reactions    Bee Venom Swelling       Physical Exam:    /80   Pulse 80   Resp 18   Ht 5' 11" (1 803 m)   Wt 90 3 kg (199 lb)   BMI 27 75 kg/m²     Constitutional:normal, well developed, well nourished, alert, in no distress and non-toxic and no overt pain behavior    Eyes:anicteric  HEENT:grossly intact  Neck:supple, symmetric, trachea midline and no masses   Pulmonary:even and unlabored  Cardiovascular:No edema or pitting edema present  Skin:Normal without rashes or lesions and well hydrated  Psychiatric:Mood and affect appropriate  Neurologic:Cranial Nerves II-XII grossly intact  Musculoskeletal:normal      Periumbilical tenderness   No guarding, no rebound  Imaging

## 2018-05-03 ENCOUNTER — TELEPHONE (OUTPATIENT)
Dept: GASTROENTEROLOGY | Facility: MEDICAL CENTER | Age: 43
End: 2018-05-03

## 2018-05-03 NOTE — TELEPHONE ENCOUNTER
----- Message from Sierra Riedel, PA-C sent at 5/1/2018  8:28 AM EDT -----  This patient needs an EUS scheduled  Dr Heriberto Hughes saw him while he was inpatient so if it can be scheduled with him that'd would be great  This can be done in the next 4-6 weeks, it is not necessarily urgent  Thank you  The patient is aware and expecting a call

## 2018-05-07 ENCOUNTER — HOSPITAL ENCOUNTER (EMERGENCY)
Facility: HOSPITAL | Age: 43
Discharge: HOME | End: 2018-05-08
Attending: EMERGENCY MEDICINE
Payer: COMMERCIAL

## 2018-05-07 ENCOUNTER — APPOINTMENT (EMERGENCY)
Dept: CT IMAGING | Facility: HOSPITAL | Age: 43
End: 2018-05-07
Payer: COMMERCIAL

## 2018-05-07 DIAGNOSIS — R10.9 ABDOMINAL PAIN: Primary | ICD-10-CM

## 2018-05-07 DIAGNOSIS — K29.70 GASTRITIS: ICD-10-CM

## 2018-05-07 LAB
ALBUMIN SERPL BCP-MCNC: 3.9 G/DL (ref 3.5–5)
ALP SERPL-CCNC: 69 U/L (ref 46–116)
ALT SERPL W P-5'-P-CCNC: 32 U/L (ref 12–78)
ANION GAP SERPL CALCULATED.3IONS-SCNC: 10 MMOL/L (ref 4–13)
AST SERPL W P-5'-P-CCNC: 37 U/L (ref 5–45)
BASOPHILS # BLD AUTO: 0.07 THOUSANDS/ΜL (ref 0–0.1)
BASOPHILS NFR BLD AUTO: 0 % (ref 0–1)
BILIRUB SERPL-MCNC: 1.6 MG/DL (ref 0.2–1)
BUN SERPL-MCNC: 13 MG/DL (ref 5–25)
CALCIUM SERPL-MCNC: 9 MG/DL (ref 8.3–10.1)
CHLORIDE SERPL-SCNC: 100 MMOL/L (ref 100–108)
CO2 SERPL-SCNC: 26 MMOL/L (ref 21–32)
CREAT SERPL-MCNC: 0.92 MG/DL (ref 0.6–1.3)
EOSINOPHIL # BLD AUTO: 0.43 THOUSAND/ΜL (ref 0–0.61)
EOSINOPHIL NFR BLD AUTO: 3 % (ref 0–6)
ERYTHROCYTE [DISTWIDTH] IN BLOOD BY AUTOMATED COUNT: 11.9 % (ref 11.6–15.1)
GFR SERPL CREATININE-BSD FRML MDRD: 100 ML/MIN/1.73SQ M
GLUCOSE SERPL-MCNC: 119 MG/DL (ref 65–140)
HCT VFR BLD AUTO: 45 % (ref 36.5–49.3)
HGB BLD-MCNC: 15.4 G/DL (ref 12–17)
LACTATE SERPL-SCNC: 1.1 MMOL/L (ref 0.5–2)
LIPASE SERPL-CCNC: 328 U/L (ref 73–393)
LYMPHOCYTES # BLD AUTO: 1.82 THOUSANDS/ΜL (ref 0.6–4.47)
LYMPHOCYTES NFR BLD AUTO: 11 % (ref 14–44)
MCH RBC QN AUTO: 32 PG (ref 26.8–34.3)
MCHC RBC AUTO-ENTMCNC: 34.2 G/DL (ref 31.4–37.4)
MCV RBC AUTO: 94 FL (ref 82–98)
MONOCYTES # BLD AUTO: 1.14 THOUSAND/ΜL (ref 0.17–1.22)
MONOCYTES NFR BLD AUTO: 7 % (ref 4–12)
NEUTROPHILS # BLD AUTO: 13.25 THOUSANDS/ΜL (ref 1.85–7.62)
NEUTS SEG NFR BLD AUTO: 79 % (ref 43–75)
NRBC BLD AUTO-RTO: 0 /100 WBCS
PLATELET # BLD AUTO: 227 THOUSANDS/UL (ref 149–390)
PMV BLD AUTO: 10.7 FL (ref 8.9–12.7)
POTASSIUM SERPL-SCNC: 4.4 MMOL/L (ref 3.5–5.3)
PROT SERPL-MCNC: 7.1 G/DL (ref 6.4–8.2)
RBC # BLD AUTO: 4.81 MILLION/UL (ref 3.88–5.62)
SODIUM SERPL-SCNC: 136 MMOL/L (ref 136–145)
WBC # BLD AUTO: 16.81 THOUSAND/UL (ref 4.31–10.16)

## 2018-05-07 PROCEDURE — 96361 HYDRATE IV INFUSION ADD-ON: CPT

## 2018-05-07 PROCEDURE — 80053 COMPREHEN METABOLIC PANEL: CPT | Performed by: NURSE PRACTITIONER

## 2018-05-07 PROCEDURE — 74177 CT ABD & PELVIS W/CONTRAST: CPT

## 2018-05-07 PROCEDURE — 96374 THER/PROPH/DIAG INJ IV PUSH: CPT

## 2018-05-07 PROCEDURE — 83605 ASSAY OF LACTIC ACID: CPT | Performed by: NURSE PRACTITIONER

## 2018-05-07 PROCEDURE — 36415 COLL VENOUS BLD VENIPUNCTURE: CPT | Performed by: NURSE PRACTITIONER

## 2018-05-07 PROCEDURE — 85025 COMPLETE CBC W/AUTO DIFF WBC: CPT | Performed by: NURSE PRACTITIONER

## 2018-05-07 PROCEDURE — 96375 TX/PRO/DX INJ NEW DRUG ADDON: CPT

## 2018-05-07 PROCEDURE — 83690 ASSAY OF LIPASE: CPT | Performed by: NURSE PRACTITIONER

## 2018-05-07 RX ORDER — KETOROLAC TROMETHAMINE 30 MG/ML
30 INJECTION, SOLUTION INTRAMUSCULAR; INTRAVENOUS ONCE
Status: COMPLETED | OUTPATIENT
Start: 2018-05-07 | End: 2018-05-07

## 2018-05-07 RX ORDER — ONDANSETRON 2 MG/ML
4 INJECTION INTRAMUSCULAR; INTRAVENOUS ONCE
Status: COMPLETED | OUTPATIENT
Start: 2018-05-07 | End: 2018-05-07

## 2018-05-07 RX ADMIN — KETOROLAC TROMETHAMINE 30 MG: 30 INJECTION, SOLUTION INTRAMUSCULAR at 23:12

## 2018-05-07 RX ADMIN — ONDANSETRON 4 MG: 2 INJECTION INTRAMUSCULAR; INTRAVENOUS at 23:11

## 2018-05-07 RX ADMIN — HYDROMORPHONE HYDROCHLORIDE 1 MG: 1 INJECTION, SOLUTION INTRAMUSCULAR; INTRAVENOUS; SUBCUTANEOUS at 23:13

## 2018-05-07 RX ADMIN — SODIUM CHLORIDE 1000 ML: 0.9 INJECTION, SOLUTION INTRAVENOUS at 23:11

## 2018-05-08 ENCOUNTER — TELEPHONE (OUTPATIENT)
Dept: GASTROENTEROLOGY | Facility: CLINIC | Age: 43
End: 2018-05-08

## 2018-05-08 ENCOUNTER — HOSPITAL ENCOUNTER (INPATIENT)
Facility: HOSPITAL | Age: 43
LOS: 2 days | Discharge: HOME | DRG: 389 | End: 2018-05-11
Attending: EMERGENCY MEDICINE | Admitting: INTERNAL MEDICINE
Payer: COMMERCIAL

## 2018-05-08 VITALS
BODY MASS INDEX: 27.67 KG/M2 | SYSTOLIC BLOOD PRESSURE: 119 MMHG | RESPIRATION RATE: 20 BRPM | HEART RATE: 80 BPM | OXYGEN SATURATION: 94 % | WEIGHT: 198.41 LBS | TEMPERATURE: 99 F | DIASTOLIC BLOOD PRESSURE: 89 MMHG

## 2018-05-08 DIAGNOSIS — K29.70 GASTRITIS: ICD-10-CM

## 2018-05-08 DIAGNOSIS — R10.9 ABDOMINAL PAIN: Primary | ICD-10-CM

## 2018-05-08 DIAGNOSIS — K86.1 CHRONIC PANCREATITIS, UNSPECIFIED PANCREATITIS TYPE (HCC): ICD-10-CM

## 2018-05-08 DIAGNOSIS — K20.90 ESOPHAGITIS: ICD-10-CM

## 2018-05-08 PROBLEM — K56.7 ILEUS (HCC): Status: ACTIVE | Noted: 2018-05-08

## 2018-05-08 LAB
ALBUMIN SERPL BCP-MCNC: 3.6 G/DL (ref 3.5–5)
ALP SERPL-CCNC: 61 U/L (ref 46–116)
ALT SERPL W P-5'-P-CCNC: 27 U/L (ref 12–78)
ANION GAP SERPL CALCULATED.3IONS-SCNC: 4 MMOL/L (ref 4–13)
AST SERPL W P-5'-P-CCNC: 15 U/L (ref 5–45)
BASOPHILS # BLD AUTO: 0.04 THOUSANDS/ΜL (ref 0–0.1)
BASOPHILS NFR BLD AUTO: 0 % (ref 0–1)
BILIRUB SERPL-MCNC: 1.5 MG/DL (ref 0.2–1)
BUN SERPL-MCNC: 10 MG/DL (ref 5–25)
CALCIUM SERPL-MCNC: 8.9 MG/DL (ref 8.3–10.1)
CHLORIDE SERPL-SCNC: 102 MMOL/L (ref 100–108)
CO2 SERPL-SCNC: 29 MMOL/L (ref 21–32)
CREAT SERPL-MCNC: 0.89 MG/DL (ref 0.6–1.3)
EOSINOPHIL # BLD AUTO: 0.68 THOUSAND/ΜL (ref 0–0.61)
EOSINOPHIL NFR BLD AUTO: 6 % (ref 0–6)
ERYTHROCYTE [DISTWIDTH] IN BLOOD BY AUTOMATED COUNT: 12.2 % (ref 11.6–15.1)
GFR SERPL CREATININE-BSD FRML MDRD: 103 ML/MIN/1.73SQ M
GLUCOSE SERPL-MCNC: 121 MG/DL (ref 65–140)
HCT VFR BLD AUTO: 43.1 % (ref 36.5–49.3)
HGB BLD-MCNC: 14.6 G/DL (ref 12–17)
LIPASE SERPL-CCNC: 172 U/L (ref 73–393)
LYMPHOCYTES # BLD AUTO: 1.47 THOUSANDS/ΜL (ref 0.6–4.47)
LYMPHOCYTES NFR BLD AUTO: 12 % (ref 14–44)
MCH RBC QN AUTO: 31.9 PG (ref 26.8–34.3)
MCHC RBC AUTO-ENTMCNC: 33.9 G/DL (ref 31.4–37.4)
MCV RBC AUTO: 94 FL (ref 82–98)
MONOCYTES # BLD AUTO: 0.86 THOUSAND/ΜL (ref 0.17–1.22)
MONOCYTES NFR BLD AUTO: 7 % (ref 4–12)
NEUTROPHILS # BLD AUTO: 9.03 THOUSANDS/ΜL (ref 1.85–7.62)
NEUTS SEG NFR BLD AUTO: 74 % (ref 43–75)
NRBC BLD AUTO-RTO: 0 /100 WBCS
PLATELET # BLD AUTO: 171 THOUSANDS/UL (ref 149–390)
PMV BLD AUTO: 10.7 FL (ref 8.9–12.7)
POTASSIUM SERPL-SCNC: 3.7 MMOL/L (ref 3.5–5.3)
PROT SERPL-MCNC: 6.8 G/DL (ref 6.4–8.2)
RBC # BLD AUTO: 4.57 MILLION/UL (ref 3.88–5.62)
SODIUM SERPL-SCNC: 135 MMOL/L (ref 136–145)
TROPONIN I SERPL-MCNC: <0.02 NG/ML
WBC # BLD AUTO: 12.14 THOUSAND/UL (ref 4.31–10.16)

## 2018-05-08 PROCEDURE — 84484 ASSAY OF TROPONIN QUANT: CPT | Performed by: EMERGENCY MEDICINE

## 2018-05-08 PROCEDURE — 85025 COMPLETE CBC W/AUTO DIFF WBC: CPT | Performed by: EMERGENCY MEDICINE

## 2018-05-08 PROCEDURE — 99219 PR INITIAL OBSERVATION CARE/DAY 50 MINUTES: CPT | Performed by: INTERNAL MEDICINE

## 2018-05-08 PROCEDURE — 36415 COLL VENOUS BLD VENIPUNCTURE: CPT | Performed by: EMERGENCY MEDICINE

## 2018-05-08 PROCEDURE — C9113 INJ PANTOPRAZOLE SODIUM, VIA: HCPCS | Performed by: INTERNAL MEDICINE

## 2018-05-08 PROCEDURE — 83690 ASSAY OF LIPASE: CPT | Performed by: EMERGENCY MEDICINE

## 2018-05-08 PROCEDURE — 99285 EMERGENCY DEPT VISIT HI MDM: CPT

## 2018-05-08 PROCEDURE — 99284 EMERGENCY DEPT VISIT MOD MDM: CPT

## 2018-05-08 PROCEDURE — 80053 COMPREHEN METABOLIC PANEL: CPT | Performed by: EMERGENCY MEDICINE

## 2018-05-08 PROCEDURE — 93005 ELECTROCARDIOGRAM TRACING: CPT

## 2018-05-08 RX ORDER — ALBUTEROL SULFATE 90 UG/1
1 AEROSOL, METERED RESPIRATORY (INHALATION) EVERY 4 HOURS PRN
Status: DISCONTINUED | OUTPATIENT
Start: 2018-05-08 | End: 2018-05-11 | Stop reason: HOSPADM

## 2018-05-08 RX ORDER — ACETAMINOPHEN 325 MG/1
650 TABLET ORAL EVERY 6 HOURS PRN
Status: DISCONTINUED | OUTPATIENT
Start: 2018-05-08 | End: 2018-05-11 | Stop reason: HOSPADM

## 2018-05-08 RX ORDER — FENOFIBRATE 145 MG/1
145 TABLET, COATED ORAL DAILY
Status: DISCONTINUED | OUTPATIENT
Start: 2018-05-08 | End: 2018-05-11 | Stop reason: HOSPADM

## 2018-05-08 RX ORDER — PANTOPRAZOLE SODIUM 40 MG/1
40 INJECTION, POWDER, FOR SOLUTION INTRAVENOUS EVERY 12 HOURS SCHEDULED
Status: DISCONTINUED | OUTPATIENT
Start: 2018-05-08 | End: 2018-05-09

## 2018-05-08 RX ORDER — DEXTROSE, SODIUM CHLORIDE, AND POTASSIUM CHLORIDE 5; .45; .15 G/100ML; G/100ML; G/100ML
100 INJECTION INTRAVENOUS CONTINUOUS
Status: DISCONTINUED | OUTPATIENT
Start: 2018-05-08 | End: 2018-05-08

## 2018-05-08 RX ORDER — RANITIDINE 150 MG/1
150 CAPSULE ORAL 2 TIMES DAILY
COMMUNITY
End: 2019-11-06

## 2018-05-08 RX ORDER — MAGNESIUM HYDROXIDE/ALUMINUM HYDROXICE/SIMETHICONE 120; 1200; 1200 MG/30ML; MG/30ML; MG/30ML
30 SUSPENSION ORAL ONCE
Status: COMPLETED | OUTPATIENT
Start: 2018-05-08 | End: 2018-05-08

## 2018-05-08 RX ORDER — ONDANSETRON 2 MG/ML
4 INJECTION INTRAMUSCULAR; INTRAVENOUS EVERY 6 HOURS PRN
Status: DISCONTINUED | OUTPATIENT
Start: 2018-05-08 | End: 2018-05-11 | Stop reason: HOSPADM

## 2018-05-08 RX ORDER — NICOTINE 21 MG/24HR
1 PATCH, TRANSDERMAL 24 HOURS TRANSDERMAL DAILY
Status: DISCONTINUED | OUTPATIENT
Start: 2018-05-08 | End: 2018-05-11 | Stop reason: HOSPADM

## 2018-05-08 RX ORDER — SODIUM CHLORIDE 9 MG/ML
100 INJECTION, SOLUTION INTRAVENOUS CONTINUOUS
Status: DISCONTINUED | OUTPATIENT
Start: 2018-05-08 | End: 2018-05-11 | Stop reason: HOSPADM

## 2018-05-08 RX ORDER — NIACIN 500 MG/1
500 TABLET, EXTENDED RELEASE ORAL
Status: DISCONTINUED | OUTPATIENT
Start: 2018-05-09 | End: 2018-05-11 | Stop reason: HOSPADM

## 2018-05-08 RX ORDER — CHLORAL HYDRATE 500 MG
1000 CAPSULE ORAL 2 TIMES DAILY
Status: DISCONTINUED | OUTPATIENT
Start: 2018-05-08 | End: 2018-05-11 | Stop reason: HOSPADM

## 2018-05-08 RX ORDER — DOCUSATE SODIUM 100 MG/1
100 CAPSULE, LIQUID FILLED ORAL 2 TIMES DAILY PRN
Status: DISCONTINUED | OUTPATIENT
Start: 2018-05-08 | End: 2018-05-11 | Stop reason: HOSPADM

## 2018-05-08 RX ORDER — PRAVASTATIN SODIUM 40 MG
40 TABLET ORAL
Status: DISCONTINUED | OUTPATIENT
Start: 2018-05-08 | End: 2018-05-11 | Stop reason: HOSPADM

## 2018-05-08 RX ORDER — TRAZODONE HYDROCHLORIDE 50 MG/1
50 TABLET ORAL
Status: DISCONTINUED | OUTPATIENT
Start: 2018-05-08 | End: 2018-05-11 | Stop reason: HOSPADM

## 2018-05-08 RX ORDER — FAMOTIDINE 20 MG/1
20 TABLET, FILM COATED ORAL 2 TIMES DAILY
Status: DISCONTINUED | OUTPATIENT
Start: 2018-05-08 | End: 2018-05-11 | Stop reason: HOSPADM

## 2018-05-08 RX ORDER — HEPARIN SODIUM 5000 [USP'U]/ML
5000 INJECTION, SOLUTION INTRAVENOUS; SUBCUTANEOUS EVERY 8 HOURS SCHEDULED
Status: DISCONTINUED | OUTPATIENT
Start: 2018-05-08 | End: 2018-05-11 | Stop reason: HOSPADM

## 2018-05-08 RX ADMIN — HYDROMORPHONE HYDROCHLORIDE 0.5 MG: 1 INJECTION, SOLUTION INTRAMUSCULAR; INTRAVENOUS; SUBCUTANEOUS at 13:55

## 2018-05-08 RX ADMIN — HEPARIN SODIUM 5000 UNITS: 5000 INJECTION, SOLUTION INTRAVENOUS; SUBCUTANEOUS at 15:07

## 2018-05-08 RX ADMIN — HYDROMORPHONE HYDROCHLORIDE 1 MG: 1 INJECTION, SOLUTION INTRAMUSCULAR; INTRAVENOUS; SUBCUTANEOUS at 11:26

## 2018-05-08 RX ADMIN — Medication 1000 MG: at 17:07

## 2018-05-08 RX ADMIN — SODIUM CHLORIDE 100 ML/HR: 0.9 INJECTION, SOLUTION INTRAVENOUS at 15:02

## 2018-05-08 RX ADMIN — HEPARIN SODIUM 5000 UNITS: 5000 INJECTION, SOLUTION INTRAVENOUS; SUBCUTANEOUS at 21:40

## 2018-05-08 RX ADMIN — IOHEXOL 100 ML: 350 INJECTION, SOLUTION INTRAVENOUS at 00:00

## 2018-05-08 RX ADMIN — FENOFIBRATE 145 MG: 145 TABLET, FILM COATED ORAL at 15:07

## 2018-05-08 RX ADMIN — HYDROMORPHONE HYDROCHLORIDE 0.2 MG: 1 INJECTION, SOLUTION INTRAMUSCULAR; INTRAVENOUS; SUBCUTANEOUS at 21:37

## 2018-05-08 RX ADMIN — HYDROMORPHONE HYDROCHLORIDE 0.2 MG: 1 INJECTION, SOLUTION INTRAMUSCULAR; INTRAVENOUS; SUBCUTANEOUS at 16:57

## 2018-05-08 RX ADMIN — ALUMINUM HYDROXIDE, MAGNESIUM HYDROXIDE, AND SIMETHICONE 30 ML: 200; 200; 20 SUSPENSION ORAL at 09:58

## 2018-05-08 RX ADMIN — PANTOPRAZOLE SODIUM 40 MG: 40 INJECTION, POWDER, FOR SOLUTION INTRAVENOUS at 15:07

## 2018-05-08 RX ADMIN — PANTOPRAZOLE SODIUM 40 MG: 40 INJECTION, POWDER, FOR SOLUTION INTRAVENOUS at 21:40

## 2018-05-08 RX ADMIN — PRAVASTATIN SODIUM 40 MG: 40 TABLET ORAL at 17:07

## 2018-05-08 RX ADMIN — TRAZODONE HYDROCHLORIDE 50 MG: 50 TABLET ORAL at 21:40

## 2018-05-08 RX ADMIN — LIDOCAINE HYDROCHLORIDE 15 ML: 20 SOLUTION ORAL; TOPICAL at 09:58

## 2018-05-08 RX ADMIN — NICOTINE 1 PATCH: 14 PATCH, EXTENDED RELEASE TRANSDERMAL at 15:07

## 2018-05-08 RX ADMIN — FAMOTIDINE 20 MG: 20 TABLET ORAL at 17:07

## 2018-05-08 NOTE — H&P
History and Physical - [facility]    Patient Information: Anne Washington 37 y o  male MRN: [id number]  Unit/Bed#: -01 Encounter: [id number]  Admitting Physician: Erendira Ha DO  PCP: Damián Alarcon MD  Date of Admission:  05/08/18    Assessment/Plan:    Hospital Problem List:     Principal Problem:    Abdominal pain  Active Problems:    Leukocytosis    Ileus ([address] )      Plan for the Primary Problem(s):    # Abdominal pain  - known hx of chronic pancreatitis for which he just recently b/l splanic nerve block on 5/2  Recent EGD in feb also noted with gastritis  S/p ct scan noting small bowel ileus    - Consult GI  - NPO  - IV PPI BID, and pt chronically on h2 blocker thus will cont  - Have educated patient given ileus on ct scan aim would be to minimize narcotics thus only place on very minimal dose until input per GI  Patient specifically asking for dilaudid  - IVF hydration  - cont supportive care  - cont creon    # Leukocytosis - likely reactive, already trending down  No focal source of infection  Cont to hold abx    # Hyponatremia, mild - IVF hydration, no change in mental status    Plan for Additional Problems:     # HL - cont meds    Disp: Plan of care extensively discussed with patient and his wife  VTE Prophylaxis: Heparin  / sequential compression device   Code Status: Full code  POLST: There is no POLST form on file for this patient (pre-hospital)    Anticipated Length of Stay:  Patient will be admitted on an Observation basis with an anticipated length of stay of  < 2 midnights  Justification for Hospital Stay: Abdominal pain    Total Time for Visit, including Counseling / Coordination of Care: 1 hour  Greater than 50% of this total time spent on direct patient counseling and coordination of care      Chief Complaint:  Epigastric Abdominal pain    History of Present Illness:    Anne Washington is a 37 y o  male medical history significant for gastritis, chronic pancreatitis, chronic pain disorder; he follows up with pain specialist and actually received on May 2nd bilateral splanchnic nerve block, history of hyperlipidemia he presents to the ER with complaint of abdominal pain  States he that it occurred initially over the weekend after having the lasgna  He had actually presented to the ER a day prior, CT scan of the abdomen was done and he was discharged early this morning  He presents back to the ER due persistent the abdominal pain  His last bowel movement was yesterday he states he was liquid  He reports of 1 episode of vomiting  Pain is located in epigastric region, extending bilaterally and up into his chest, he reported of acid reflux  He denies melena, hematemesis  His last EGD was on April 19th, revealing gastritis  Review of Systems:    Review of Systems   Constitutional: Negative  HENT: Negative  Eyes: Negative  Respiratory: Negative  Cardiovascular: Negative  Gastrointestinal: Positive for abdominal pain, nausea and vomiting  Endocrine: Negative  Genitourinary: Negative  Musculoskeletal: Negative  Allergic/Immunologic: Negative  Neurological: Negative  Hematological: Negative  Psychiatric/Behavioral: Negative  Past Medical and Surgical History:     Past Medical History:   Diagnosis Date    Asthma     Chronic pain disorder     GERD (gastroesophageal reflux disease)     Hyperlipidemia     Liver abscess     Meniscus tear     Pancreatitis        Past Surgical History:   Procedure Laterality Date    CHOLECYSTECTOMY      ESOPHAGOGASTRODUODENOSCOPY N/A 11/16/2017    Procedure: ESOPHAGOGASTRODUODENOSCOPY (EGD); Surgeon: Vaibhav Duggan MD;  Location: [facility]; Service: Gastroenterology    ESOPHAGOGASTRODUODENOSCOPY N/A 4/19/2018    Procedure: ESOPHAGOGASTRODUODENOSCOPY (EGD); Surgeon: Paxton Riley MD;  Location: [facility];   Service: Gastroenterology    KNEE ARTHROSCOPY Bilateral     LIVER SURGERY      PANCREAS SURGERY      stents    KS INJECT NERV BLCK,CELIAC PLEXUS Bilateral 5/9/2017    Procedure: CELIAC PLEXUS BLOCK ;  Surgeon: Lizzette Castro MD;  [facility];  Service: Pain Management     KS INJECT NERV BLCK,CELIAC PLEXUS Bilateral 6/1/2017    Procedure: SPLANCHNIC NERVE BLOCK at T12;  Surgeon: Lizzette Castro MD;  [facility];  Service: Pain Management     KS INJECT NERV BLCK,CELIAC PLEXUS Bilateral 8/8/2017    Procedure: BILATERAL SPLANCHNIC NERVE BLOCK T12;  Surgeon: Lizzette Castro MD;  [facility];  Service: Pain Management     KS INJECT NERV Roman Kehr Bilateral 12/28/2017    Procedure: SPLANCHNIC NERVE BLOCK;  Surgeon: Lizzette Castro MD;  [facility];  Service: Pain Management     KS LAP,CHOLECYSTECTOMY N/A 2/14/2017    Procedure: LAPAROSCOPIC CHOLECYSTECTOMY, IOC, POSSIBLE OPEN ;  Surgeon: Colleen Garber MD;  [facility];  Service: General    ROTATOR CUFF REPAIR Right     SHOULDER ARTHROSCOPY      SHOULDER ARTHROSCOPY Right        Meds/Allergies:    Prior to Admission medications    Medication Sig Start Date End Date Taking? Authorizing Provider   ranitidine (ZANTAC) 150 MG capsule Take 150 mg by mouth 2 (two) times a day   Yes Historical Provider, MD   albuterol (PROVENTIL HFA,VENTOLIN HFA) 90 mcg/act inhaler Inhale 1-2 puffs every 4 (four) hours as needed for wheezing      Historical Provider, MD Munoz Fenofibrate (FENOFIBRIC ACID) 135 MG CPDR Take 1 capsule by mouth daily 9/6/16   Historical Provider, MD   choline fenofibrate (TRILIPIX) 135 MG capsule Take 1 capsule (135 mg total) by mouth daily 4/24/18   LUANA Zarco   EPINEPHrine (EPIPEN 2-LARRY) 0 3 mg/0 3 mL SOAJ EpiPen 2-Larry 0 3 MG/0 3ML Injection Solution Auto-injector  INJECT 0 3ML INTRAMUSCULARLY AS DIRECTED     Quantity: 1;  Refills: 1      Corlis Bumps, Allison Starch ; Active  2 Solution Auto-injector Pen    Historical Provider, MD   FLAXSEED, LINSEED, PO Take 1,000 mg by mouth 3 (three) times a day    Historical Provider, MD   niacin (NIASPAN) 500 mg CR tablet Take 1 tablet (500 mg total) by mouth daily with breakfast 3/8/18   LUANA Gordon   omega-3-acid ethyl esters (LOVAZA) 1 g capsule Take 2 capsules (2 g total) by mouth 2 (two) times a day for 90 days 4/9/18 7/8/18  LUANA Gordon   ondansetron (ZOFRAN-ODT) 4 mg disintegrating tablet Take 1 tablet (4 mg total) by mouth every 8 (eight) hours as needed for nausea or vomiting 5/1/18   LUANA Gordon   pancrelipase, Lip-Prot-Amyl, (CREON) 12,000 units capsule Take 12,000 units of lipase by mouth 3 (three) times a day with meals   4/13/17   Historical Provider, MD   Pancrelipase, Lip-Prot-Amyl, (CREON) 63502 units CPEP Take 1 capsule (36,000 Units total) by mouth 3 (three) times a day before meals 3/19/18   Dinora Gardner PA-C   pantoprazole (PROTONIX) 40 mg tablet Take 1 tablet (40 mg total) by mouth 2 (two) times a day 3/23/18   LUANA Gordon   rosuvastatin (CRESTOR) 20 MG tablet Take 1 tablet (20 mg total) by mouth daily 3/19/18   LUANA Gordon   traZODone (DESYREL) 50 mg tablet Take 1 tablet (50 mg total) by mouth daily at bedtime 5/1/18   LUANA Gordon   aspirin 325 mg tablet Take 1 tablet by mouth daily  5/8/18  Historical Provider, MD   Chlorzoxazone (LORZONE) 750 MG TABS Take by mouth 12/26/17 5/8/18  Historical Provider, MD   HYDROmorphone (DILAUDID) 2 mg tablet Take 2 mg by mouth every 4 (four) hours 8/4/15 5/8/18  Historical Provider, MD   traMADol Victor Bakes) 50 mg tablet Take by mouth Twice daily 12/1/17 5/8/18  Historical Provider, MD SILVESTRE have reviewed home medications with patient personally  Allergies:    Allergies   Allergen Reactions    Bee Venom Swelling       Social History:     Marital Status: /Civil Union   Occupation: Presently employed  Patient Pre-hospital Living Situation:  With his wife Resides at home  Patient Pre-hospital Level of Mobility:  Independent  Patient Pre-hospital Diet Restrictions:  None  Substance Use History:   History   Alcohol Use    Yes     Comment: rarely     History   Smoking Status    Current Every Day Smoker    Packs/day: 0 50    Years: 20 00   Smokeless Tobacco    Never Used     History   Drug Use No       Family History:    non-contributory    Physical Exam:     Vitals:   Blood Pressure: 152/87 (05/08/18 1200)  Pulse: 85 (05/08/18 1200)  Temperature: 98 8 °F (37 1 °C) (05/08/18 1200)  Temp Source: Oral (05/08/18 1200)  Respirations: 20 (05/08/18 1200)  Height: 5' 11" (180 3 cm) (05/08/18 1200)  Weight - Scale: 87 9 kg (193 lb 12 6 oz) (05/08/18 1200)  SpO2: 94 % (05/08/18 1200)    General Appearance:  Alert, cooperative, no distress, appears stated age   Head:  Normocephalic, without obvious abnormality, atraumatic   Neck: Supple   Lungs:   Clear to auscultation bilaterally, respirations unlabored   Chest Wall:  No tenderness or deformity    Heart:  Regular rate and rhythm, S1 and S2 normal, no murmur, rub or gallop   Abdomen:   Soft, tenderness in epigastric region, bowel sounds active all four quadrants,  no masses, no organomegaly   Extremities: Extremities normal, atraumatic, no cyanosis or edema   Pulses: 2+ and symmetric all extremities   Skin: Skin color, texture, turgor normal, no rashes or lesions   Lymph nodes:    Neurologic: CNII-XII intact, speech fluent, comprehensible, no facial asymmetry; normal strength 5/5 in all major muscle groups, sensation and reflexes throughout       Additional Data:     Lab Results: I have personally reviewed pertinent reports          Results from last 7 days  Lab Units 05/08/18  0957   WBC Thousand/uL 12 14*   HEMOGLOBIN g/dL 14 6   HEMATOCRIT % 43 1   PLATELETS Thousands/uL 171   NEUTROS PCT % 74   LYMPHS PCT % 12*   MONOS PCT % 7   EOS PCT % 6       Results from last 7 days  Lab Units 05/08/18  0957   SODIUM mmol/L 135*   POTASSIUM mmol/L 3 7   CHLORIDE mmol/L 102   CO2 mmol/L 29   BUN mg/dL 10 CREATININE mg/dL 0 89   CALCIUM mg/dL 8 9   TOTAL PROTEIN g/dL 6 8   BILIRUBIN TOTAL mg/dL 1 50*   ALK PHOS U/L 61   ALT U/L 27   AST U/L 15   GLUCOSE RANDOM mg/dL 121           Imaging: I have personally reviewed pertinent reports  Ct Abdomen Pelvis With Contrast    Result Date: 5/8/2018  Narrative: CT ABDOMEN AND PELVIS WITH IV CONTRAST INDICATION:   pain, hx pancreatitis  COMPARISON: CT abdomen and pelvis 4/18/2018 TECHNIQUE:  CT examination of the abdomen and pelvis was performed  Axial, sagittal, and coronal 2D reformatted images were created from the source data and submitted for interpretation  Radiation dose length product (DLP) for this visit:  674 91 mGy-cm   This examination, like all CT scans performed in the [facility], was performed utilizing techniques to minimize radiation dose exposure, including the use of iterative  reconstruction and automated exposure control  IV Contrast:  100 mL of iohexol (OMNIPAQUE)  350 Enteric Contrast:  Enteric contrast was not administered  FINDINGS: ABDOMEN LOWER CHEST:  New trace bilateral pleural effusions right larger than left  Bibasilar dependent hypoventilatory change  New trace fluid adjacent to the distal esophagus  LIVER/BILIARY TREE:  Liver is enlarged  Right lobe of liver measures 24 cm craniocaudal   No significant interval change  Mild atrophy of the left lobe  No focal hepatic lesions are noted  No biliary dilatation  GALLBLADDER:  Gallbladder is surgically absent  SPLEEN:  Unremarkable  PANCREAS:  Hypoplastic tail of the pancreas versus severe fat infiltration  No change ADRENAL GLANDS:  Unremarkable  KIDNEYS/URETERS:  Unremarkable  No hydronephrosis  STOMACH AND BOWEL:  New low-density thickening of the GE and gastric cardiac and fundal wall  There is mild adjacent stranding   Slightly prominent soft tissue stranding or thickening extending from the medial inferior gastric cardia towards the superior distal pancreatic body similar to the prior exam as seen on images 76 through 80 series 601  No emphysematous gastritis or pneumatosis intestinalis  No bowel obstruction  Gas fluid levels are present in mid small bowel  APPENDIX:  A normal appendix was visualized  ABDOMINOPELVIC CAVITY:  Minimal free fluid in the dependent pelvis  No free gas  Shotty gastroesophageal junction and gastrohepatic lymph nodes again noted  No significant interval change  VESSELS:  Aortoiliac calcification  No aneurysm  Prominent collateral vessels in the ventral mid and left upper abdomen  Recanalized umbilical vein  Small perigastric and perisplenic varices  PELVIS REPRODUCTIVE ORGANS:  Unremarkable for patient's age  URINARY BLADDER:  Minimal thickening of the bladder wall attributed to under distention  ABDOMINAL WALL/INGUINAL REGIONS: Subcentimeter ventral umbilical abdominal wall diastases containing fat  No bowel herniation  Tiny bilateral fat-containing inguinal hernias  No inguinal mass  OSSEOUS STRUCTURES:  No acute fracture or destructive osseous lesion  Mild degenerative changes of the spine and bilateral hips  Stable bilateral femoral head AVN  Impression: Severe progressive or recurrent proximal gastritis with new distal esophagitis  No emphysematous gastritis, abscess, or perforation  New trace bilateral pleural effusions right greater than left  Mild reactive mid small bowel ileus  No other significant interval change  Findings are consistent with the preliminary report from [employer] which was provided shortly after completion of the exam  Workstation performed: [id number]     Ct Abdomen Pelvis With Contrast    Result Date: 4/18/2018  Narrative: CT ABDOMEN AND PELVIS WITH IV CONTRAST INDICATION:   abd pain  COMPARISON: CT abdomen and pelvis 11/15/2017 TECHNIQUE:  CT examination of the abdomen and pelvis was performed   Axial, sagittal, and coronal 2D reformatted images were created from the source data and submitted for interpretation  Radiation dose length product (DLP) for this visit:  687 mGy-cm   This examination, like all CT scans performed in the [facility], was performed utilizing techniques to minimize radiation dose exposure, including the use of iterative reconstruction and automated exposure control  IV Contrast:  100 mL of iohexol (OMNIPAQUE)  350 Enteric Contrast:  Enteric contrast was not administered  FINDINGS: ABDOMEN LOWER CHEST:  Trace patchy groundglass opacities in the right lung base and right middle lobe attributed to new focal scarring  LIVER/BILIARY TREE:  Liver is enlarged  Right lobe of liver measures 24 cm craniocaudal   No significant interval change  Mild atrophy of the left lobe  No focal hepatic lesions are noted  No biliary dilatation  GALLBLADDER:  Gallbladder is surgically absent  SPLEEN:  Unremarkable  PANCREAS:  Hypoplastic tail of the pancreas versus severe fat infiltration  No change  ADRENAL GLANDS:  Unremarkable  KIDNEYS/URETERS:  Unremarkable  No hydronephrosis  STOMACH AND BOWEL:  Severe nonspecific thickening of the stomach extending up to the gastric cardia and GE junction  Mild stranding adjacent to the gastric cardia and minimal thickening of the adjacent left diaphragmatic leodan  Slightly prominent elongated soft tissue extending from the medial inferior gastric cardia inferiorly towards the posterior superior distal pancreatic body as seen on images 79 through 81 series 602  This is in the area of prior severe active inflammation and attributed to scarring  No fluid or gaseous fistulous tract  No emphysematous gastritis or pneumatosis intestinalis  No bowel obstruction  APPENDIX:  A normal appendix was visualized  ABDOMINOPELVIC CAVITY:  Trace free fluid in the dependent pelvis  No free gas  Shotty lymph nodes adjacent to the GE junction and gastric cardia  Shotty gastrohepatic lymph nodes again noted  VESSELS:  Aortoiliac calcification  No aneurysm  Prominent collateral vessels in the ventral mid and left upper abdomen  Recanalized umbilical vein is noted  Small perigastric and perisplenic varices  PELVIS REPRODUCTIVE ORGANS:  Unremarkable for patient's age  URINARY BLADDER:  Unremarkable  ABDOMINAL WALL/INGUINAL REGIONS: Subcentimeter ventral umbilical abdominal wall diastases containing fat  No bowel herniation  Tiny bilateral fat-containing inguinal hernias  No inguinal mass  OSSEOUS STRUCTURES:  No acute fracture or osseous destructive lesion identified  Mild degenerative changes of the spine and bilateral hips  Stable bilateral femoral head AVN     Impression: Severe chronic gastritis with mild active inflammation of the gastric cardia and GE junction  Adjacent shotty reactive lymph nodes are present  No emphysematous gastritis, free gas, or abscess  Prominent soft tissue tract extending from the medial inferior gastric cardia inferiorly towards the posterior superior distal pancreatic body as seen on images 79 through 81 series 602  This is in the area of prior severe active inflammation and attributed to scarring  No fluid or gaseous fistulous tract  Stable hepatomegaly  Trace new multifocal patchy groundglass opacities right middle lobe and right lung base attributed to scarring  The study was marked in [facility] for immediate notification  Workstation performed: [id number]       EKG, Pathology, and Other Studies Reviewed on Admission:   · EKG:     [employer] Records Reviewed: Yes     ** Please Note: Dragon 360 Dictation voice to text software may have been used in the creation of this document   **

## 2018-05-08 NOTE — PLAN OF CARE
Problem: Potential for Falls  Goal: Patient will remain free of falls  INTERVENTIONS:  - Assess patient frequently for physical needs  -  Identify cognitive and physical deficits and behaviors that affect risk of falls    -  [place] fall precautions as indicated by assessment   - Educate patient/family on patient safety including physical limitations  - Instruct patient to call for assistance with activity based on assessment  - Modify environment to reduce risk of injury  - Consider OT/PT consult to assist with strengthening/mobility   Outcome: Progressing      Problem: PAIN - ADULT  Goal: Verbalizes/displays adequate comfort level or baseline comfort level  Interventions:  - Encourage patient to monitor pain and request assistance  - Assess pain using appropriate pain scale  - Administer analgesics based on type and severity of pain and evaluate response  - Implement non-pharmacological measures as appropriate and evaluate response  - Consider cultural and social influences on pain and pain management  - Notify physician/advanced practitioner if interventions unsuccessful or patient reports new pain  Outcome: Progressing      Problem: INFECTION - ADULT  Goal: Absence or prevention of progression during hospitalization  INTERVENTIONS:  - Assess and monitor for signs and symptoms of infection  - Monitor lab/diagnostic results  - Monitor all insertion sites, i e  indwelling lines, tubes, and drains  - Monitor endotracheal (as able) and nasal secretions for changes in amount and color  - [place] appropriate cooling/warming therapies per order  - Administer medications as ordered  - Instruct and encourage patient and family to use good hand hygiene technique  - Identify and instruct in appropriate isolation precautions for identified infection/condition  Outcome: Progressing    Goal: Absence of fever/infection during neutropenic period  INTERVENTIONS:  - Monitor WBC  - Implement neutropenic guidelines  Outcome: Progressing      Problem: SAFETY ADULT  Goal: Maintain or return to baseline ADL function  INTERVENTIONS:  -  Assess patient's ability to carry out ADLs; assess patient's baseline for ADL function and identify physical deficits which impact ability to perform ADLs (bathing, care of mouth/teeth, toileting, grooming, dressing, etc )  - Assess/evaluate cause of self-care deficits   - Assess range of motion  - Assess patient's mobility; develop plan if impaired  - Assess patient's need for assistive devices and provide as appropriate  - Encourage maximum independence but intervene and supervise when necessary  ¯ Involve family in performance of ADLs  ¯ Assess for home care needs following discharge   ¯ Request OT consult to assist with ADL evaluation and planning for discharge  ¯ Provide patient education as appropriate  Outcome: Progressing    Goal: Maintain or return mobility status to optimal level  INTERVENTIONS:  - Assess patient's baseline mobility status (ambulation, transfers, stairs, etc )    - Identify cognitive and physical deficits and behaviors that affect mobility  - Identify mobility aids required to assist with transfers and/or ambulation (gait belt, sit-to-stand, lift, walker, cane, etc )  - [place] fall precautions as indicated by assessment  - Record patient progress and toleration of activity level on Mobility SBAR; progress patient to next Phase/Stage  - Instruct patient to call for assistance with activity based on assessment  - Request Rehabilitation consult to assist with strengthening/weightbearing, etc   Outcome: Progressing      Problem: DISCHARGE PLANNING  Goal: Discharge to home or other facility with appropriate resources  INTERVENTIONS:  - Identify barriers to discharge w/patient and caregiver  - Arrange for needed discharge resources and transportation as appropriate  - Identify discharge learning needs (meds, wound care, etc )  - Arrange for interpretive services to assist at discharge as needed  - Refer to Case Management Department for coordinating discharge planning if the patient needs post-hospital services based on physician/advanced practitioner order or complex needs related to functional status, cognitive ability, or social support system  Outcome: Progressing      Problem: Knowledge Deficit  Goal: Patient/family/caregiver demonstrates understanding of disease process, treatment plan, medications, and discharge instructions  Complete learning assessment and assess knowledge base    Interventions:  - Provide teaching at level of understanding  - Provide teaching via preferred learning methods  Outcome: Progressing

## 2018-05-08 NOTE — ED PROVIDER NOTES
History  Chief Complaint   Patient presents with    Chest Pain     here this morning with abd pain, now going to chest     Patient returns to the emergency department after being discharged at 2:00 a m  where he was evaluated for upper abdominal pain  He had a CT scan which was consistent with worsening gastritis and an elevated white count and mildly elevated bilirubin  He was discharged after being given Protonix intravenously  He returns now stating that the discomfort is shooting up into his chest   He denies back or lower belly pain  He denies jaw arm or neck pain  He denies diaphoresis or clamminess  He denies respiratory distress  He denies trauma fall  He denies fever chills  Prior to Admission Medications   Prescriptions Last Dose Informant Patient Reported? Taking? Chlorzoxazone (LORZONE) 750 MG TABS  Self Yes No   Sig: Take by mouth   Choline Fenofibrate (FENOFIBRIC ACID) 135 MG CPDR  Self Yes No   Sig: Take 1 capsule by mouth daily   EPINEPHrine (EPIPEN 2-ARIEL) 0 3 mg/0 3 mL SOAJ  Self Yes No   Sig: EpiPen 2-Ariel 0 3 MG/0 3ML Injection Solution Auto-injector  INJECT 0 3ML INTRAMUSCULARLY AS DIRECTED     Quantity: 1;  Refills: 1      Toro Briceño ; Active  2 Solution Auto-injector Pen   FLAXSEED, LINSEED, PO  Self Yes No   Sig: Take 1,000 mg by mouth 3 (three) times a day   HYDROmorphone (DILAUDID) 2 mg tablet  Self Yes No   Sig: Take 2 mg by mouth every 4 (four) hours   Pancrelipase, Lip-Prot-Amyl, (CREON) 02306 units CPEP  Self No No   Sig: Take 1 capsule (36,000 Units total) by mouth 3 (three) times a day before meals   albuterol (PROVENTIL HFA,VENTOLIN HFA) 90 mcg/act inhaler  Self Yes No   Sig: Inhale 1-2 puffs every 4 (four) hours as needed for wheezing     aspirin 325 mg tablet  Self Yes No   Sig: Take 1 tablet by mouth daily   choline fenofibrate (TRILIPIX) 135 MG capsule  Self No No   Sig: Take 1 capsule (135 mg total) by mouth daily   niacin (NIASPAN) 500 mg CR tablet Self No No   Sig: Take 1 tablet (500 mg total) by mouth daily with breakfast   omega-3-acid ethyl esters (LOVAZA) 1 g capsule  Self No No   Sig: Take 2 capsules (2 g total) by mouth 2 (two) times a day for 90 days   ondansetron (ZOFRAN-ODT) 4 mg disintegrating tablet   No No   Sig: Take 1 tablet (4 mg total) by mouth every 8 (eight) hours as needed for nausea or vomiting   pancrelipase, Lip-Prot-Amyl, (CREON) 12,000 units capsule  Self Yes No   Sig: Take by mouth   pantoprazole (PROTONIX) 40 mg tablet  Self No No   Sig: Take 1 tablet (40 mg total) by mouth 2 (two) times a day   rosuvastatin (CRESTOR) 20 MG tablet  Self No No   Sig: Take 1 tablet (20 mg total) by mouth daily   traMADol (ULTRAM) 50 mg tablet  Self Yes No   Sig: Take by mouth Twice daily   traZODone (DESYREL) 50 mg tablet   No No   Sig: Take 1 tablet (50 mg total) by mouth daily at bedtime      Facility-Administered Medications: None       Past Medical History:   Diagnosis Date    Asthma     Chronic pain disorder     GERD (gastroesophageal reflux disease)     Hyperlipidemia     Liver abscess     Meniscus tear     Pancreatitis        Past Surgical History:   Procedure Laterality Date    CHOLECYSTECTOMY      ESOPHAGOGASTRODUODENOSCOPY N/A 11/16/2017    Procedure: ESOPHAGOGASTRODUODENOSCOPY (EGD); Surgeon: Brooklynn De Jesus MD;  Location: [facility]; Service: Gastroenterology    ESOPHAGOGASTRODUODENOSCOPY N/A 4/19/2018    Procedure: ESOPHAGOGASTRODUODENOSCOPY (EGD); Surgeon: Sola Cohen MD;  Location: [facility];   Service: Gastroenterology    KNEE ARTHROSCOPY Bilateral     LIVER SURGERY      PANCREAS SURGERY      stents    MI INJECT NERV BLCK,CELIAC PLEXUS Bilateral 5/9/2017    Procedure: CELIAC PLEXUS BLOCK ;  Surgeon: Deirdre Tong MD;  [facility]: [facility];  Service: Pain Management     MI INJECT NERV BLCK,CELIAC PLEXUS Bilateral 6/1/2017    Procedure: SPLANCHNIC NERVE BLOCK at T12;  Surgeon: Deirdre Tong MD;  [facility]; Service: Pain Management     MO INJECT NERV BLCK,CELIAC PLEXUS Bilateral 8/8/2017    Procedure: BILATERAL SPLANCHNIC NERVE BLOCK T12;  Surgeon: Collin Seo MD;  [facility];  Service: Pain Management     MO INJECT NERV Birdena Portal Bilateral 12/28/2017    Procedure: SPLANCHNIC NERVE BLOCK;  Surgeon: Collin Seo MD;  [facility];  Service: Pain Management     MO LAP,CHOLECYSTECTOMY N/A 2/14/2017    Procedure: LAPAROSCOPIC CHOLECYSTECTOMY, IOC, POSSIBLE OPEN ;  Surgeon: Greg Gonzalez MD;  [facility];  Service: General    ROTATOR CUFF REPAIR Right     SHOULDER ARTHROSCOPY      SHOULDER ARTHROSCOPY Right        Family History   Problem Relation Age of Onset    Cirrhosis Mother      I have reviewed and agree with the history as documented  Social History   Substance Use Topics    Smoking status: Current Every Day Smoker     Packs/day: 0 50     Years: 20 00    Smokeless tobacco: Never Used    Alcohol use Yes      Comment: rarely        Review of Systems   Constitutional: Negative  Negative for activity change, appetite change, chills, diaphoresis, fatigue and fever  HENT: Negative  Negative for congestion  Eyes: Negative  Negative for photophobia and visual disturbance  Respiratory: Negative  Negative for apnea, cough, choking, chest tightness, shortness of breath, wheezing and stridor  Cardiovascular: Positive for chest pain  Negative for palpitations and leg swelling  Gastrointestinal: Positive for abdominal pain  Negative for blood in stool, constipation, diarrhea, nausea, rectal pain and vomiting  Endocrine: Negative  Genitourinary: Negative  Negative for dysuria, frequency and urgency  Musculoskeletal: Negative  Negative for back pain, myalgias, neck pain and neck stiffness  Skin: Negative  Negative for rash and wound  Allergic/Immunologic: Negative  Neurological: Negative    Negative for dizziness, tremors, seizures, syncope, facial asymmetry, speech difficulty, weakness, light-headedness, numbness and headaches  Hematological: Negative  Psychiatric/Behavioral: Negative  Negative for agitation, behavioral problems, confusion and self-injury  Physical Exam  ED Triage Vitals   Temperature Pulse Respirations Blood Pressure SpO2   05/08/18 0857 05/08/18 0857 05/08/18 0857 05/08/18 1100 05/08/18 0857   98 6 °F (37 °C) 85 18 138/93 96 %      Temp src Heart Rate Source Patient Position - Orthostatic VS BP Location FiO2 (%)   -- -- -- -- --             Pain Score       05/08/18 0857       9           Orthostatic Vital Signs  Vitals:    05/08/18 0857 05/08/18 1100   BP:  138/93   Pulse: 85 79       Physical Exam   Constitutional: He is oriented to person, place, and time  He appears well-developed and well-nourished  Nontoxic appearance without respiratory distress  Patient looks mildly uncomfortable sitting upright in the stretcher  HENT:   Head: Normocephalic and atraumatic  Right Ear: External ear normal    Left Ear: External ear normal    Mouth/Throat: Oropharynx is clear and moist    Eyes: Conjunctivae and EOM are normal  Pupils are equal, round, and reactive to light  Neck: Normal range of motion  Neck supple  Cardiovascular: Normal rate, regular rhythm, normal heart sounds and intact distal pulses  Pulmonary/Chest: Effort normal and breath sounds normal  No respiratory distress  He has no wheezes  He has no rales  He exhibits no tenderness  Abdominal: Soft  Bowel sounds are normal  He exhibits no distension and no mass  There is no tenderness  There is no rebound and no guarding  No hernia  No peritoneal signs  Mild epigastric tenderness to palpation  Musculoskeletal: Normal range of motion  He exhibits no edema, tenderness or deformity  Neurological: He is alert and oriented to person, place, and time  He has normal reflexes  He displays normal reflexes  No cranial nerve deficit or sensory deficit   He exhibits normal muscle tone  Coordination normal    Skin: Skin is warm and dry  No rash noted  No erythema  No pallor  Psychiatric: He has a normal mood and affect  His behavior is normal  Judgment and thought content normal    Nursing note and vitals reviewed  ED Medications  Medications   HYDROmorphone (DILAUDID) injection 1 mg (not administered)   aluminum-magnesium hydroxide-simethicone (MYLANTA) 200-200-20 mg/5 mL oral suspension 30 mL (30 mL Oral Given 5/8/18 0958)   lidocaine viscous (XYLOCAINE) 2 % mucosal solution 15 mL (15 mL Swish & Spit Given 5/8/18 0958)       Diagnostic Studies  Results Reviewed     Procedure Component Value Units Date/Time    Troponin I [[id number]]  (Normal) Collected:  05/08/18 0957    Lab Status:  Final result Specimen:  Blood from Arm, Right Updated:  05/08/18 1042     Troponin I <0 02 ng/mL     Narrative:         [employer] Chemistry analyzer 99% cutoff is > 0 04 ng/mL in network labs    o cTnI 99% cutoff is useful only when applied to patients in the clinical setting of myocardial ischemia  o cTnI 99% cutoff should be interpreted in the context of clinical history, ECG findings and possibly cardiac imaging to establish correct diagnosis  o cTnI 99% cutoff may be suggestive but clearly not indicative of a coronary event without the clinical setting of myocardial ischemia      Lipase [[id number]]  (Normal) Collected:  05/08/18 0957    Lab Status:  Final result Specimen:  Blood from Arm, Right Updated:  05/08/18 1035     Lipase 172 u/L     Comprehensive metabolic panel [[id number]]  (Abnormal) Collected:  05/08/18 0957    Lab Status:  Final result Specimen:  Blood from Arm, Right Updated:  05/08/18 1035     Sodium 135 (L) mmol/L      Potassium 3 7 mmol/L      Chloride 102 mmol/L      CO2 29 mmol/L      Anion Gap 4 mmol/L      BUN 10 mg/dL      Creatinine 0 89 mg/dL      Glucose 121 mg/dL      Calcium 8 9 mg/dL      AST 15 U/L      ALT 27 U/L      Alkaline Phosphatase 61 U/L Total Protein 6 8 g/dL      Albumin 3 6 g/dL      Total Bilirubin 1 50 (H) mg/dL      eGFR 103 ml/min/1 73sq m     Narrative:         National Kidney Disease Education Program recommendations are as follows:  GFR calculation is accurate only with a steady state creatinine  Chronic Kidney disease less than 60 ml/min/1 73 sq  meters  Kidney failure less than 15 ml/min/1 73 sq  meters      CBC and differential [[id number]]  (Abnormal) Collected:  05/08/18 0957    Lab Status:  Final result Specimen:  Blood from Arm, Right Updated:  05/08/18 1019     WBC 12 14 (H) Thousand/uL      RBC 4 57 Million/uL      Hemoglobin 14 6 g/dL      Hematocrit 43 1 %      MCV 94 fL      MCH 31 9 pg      MCHC 33 9 g/dL      RDW 12 2 %      MPV 10 7 fL      Platelets 787 Thousands/uL      nRBC 0 /100 WBCs      Neutrophils Relative 74 %      Lymphocytes Relative 12 (L) %      Monocytes Relative 7 %      Eosinophils Relative 6 %      Basophils Relative 0 %      Neutrophils Absolute 9 03 (H) Thousands/µL      Lymphocytes Absolute 1 47 Thousands/µL      Monocytes Absolute 0 86 Thousand/µL      Eosinophils Absolute 0 68 (H) Thousand/µL      Basophils Absolute 0 04 Thousands/µL                  No orders to display              Procedures  ECG 12 Lead Documentation  Date/Time: 5/8/2018 9:05 AM  Performed by: Dian Amato  Authorized by: Marcelo BURNETT     ECG reviewed by me, the ED Provider: yes    Patient location:  ED  Previous ECG:     Previous ECG:  Compared to current    Similarity:  No change  Interpretation:     Interpretation: normal    Rate:     ECG rate:  89    ECG rate assessment: normal    Rhythm:     Rhythm: sinus rhythm    Ectopy:     Ectopy: none    QRS:     QRS axis:  Normal    QRS intervals:  Normal  Conduction:     Conduction: normal    ST segments:     ST segments:  Normal  T waves:     T waves: normal               Phone Contacts  ED Phone Contact    ED Course  ED Course as of May 08 1125   Tue May 08, 2018   1125 This yuliWas discussed with karan the nurse practitioner for the hospitalist service was accepted this patient for the service  Patient had minimal relief with the GI cocktail  Dilaudid was given  Pending transfer to the [place] [id number] floor  [facility]  CritCare Time    Disposition  Final diagnoses:   Abdominal pain   Gastritis   Esophagitis     Time reflects when diagnosis was documented in both MDM as applicable and the Disposition within this note     Time User Action Codes Description Comment    5/8/2018 11:21 AM Tyrel Lee Add [R10 9] Abdominal pain     5/8/2018 11:21 AM Rajan Edgar Add [K29 70] Gastritis     5/8/2018 11:21 AM Rajan Edgar Add [K20 9] Esophagitis       ED Disposition     ED Disposition Condition Comment    Admit  Case was discussed with Dr Purvi Pena and the patient's admission status was agreed to be Admission Status: observation status to the service of Dr Tricia Cummings    None       Patient's Medications   Discharge Prescriptions    No medications on file     No discharge procedures on file      ED Provider  Electronically Signed by           Rosena Hatchet, MD  05/08/18 8499

## 2018-05-08 NOTE — ED PROVIDER NOTES
History  Chief Complaint   Patient presents with    Abdominal Pain     pt co of abd pain onset today, +n/v/d/chills     26-year-old male patient presenting here with a chief complaint of upper abdominal pain  He reports that the pain has intensified throughout the day  He reports that he could not miss work so he did work all day and now is presenting here  He has a history of pancreatitis and reports that he did have a big meal with lasagna and meat sauce yesterday which may or may not have a the influence and today's presentation  He denies fever chills  His pain is localized to the upper abdomen  He has history of cholecystectomy  Differentials include pancreatitis or acute on chronic pancreatitis as well as gastroenteritis  Prior to Admission Medications   Prescriptions Last Dose Informant Patient Reported? Taking? Choline Fenofibrate (FENOFIBRIC ACID) 135 MG CPDR  Self Yes No   Sig: Take 1 capsule by mouth daily   EPINEPHrine (EPIPEN 2-ARIEL) 0 3 mg/0 3 mL SOAJ  Self Yes No   Sig: EpiPen 2-Ariel 0 3 MG/0 3ML Injection Solution Auto-injector  INJECT 0 3ML INTRAMUSCULARLY AS DIRECTED     Quantity: 1;  Refills: 1      Toro Briceño ; Active  2 Solution Auto-injector Pen   FLAXSEED, LINSEED, PO  Self Yes No   Sig: Take 1,000 mg by mouth 3 (three) times a day   Pancrelipase, Lip-Prot-Amyl, (CREON) 98127 units CPEP  Self No No   Sig: Take 1 capsule (36,000 Units total) by mouth 3 (three) times a day before meals   albuterol (PROVENTIL HFA,VENTOLIN HFA) 90 mcg/act inhaler  Self Yes No   Sig: Inhale 1-2 puffs every 4 (four) hours as needed for wheezing     choline fenofibrate (TRILIPIX) 135 MG capsule  Self No No   Sig: Take 1 capsule (135 mg total) by mouth daily   niacin (NIASPAN) 500 mg CR tablet  Self No No   Sig: Take 1 tablet (500 mg total) by mouth daily with breakfast   omega-3-acid ethyl esters (LOVAZA) 1 g capsule  Self No No   Sig: Take 2 capsules (2 g total) by mouth 2 (two) times a day for 90 days   ondansetron (ZOFRAN-ODT) 4 mg disintegrating tablet   No No   Sig: Take 1 tablet (4 mg total) by mouth every 8 (eight) hours as needed for nausea or vomiting   pancrelipase, Lip-Prot-Amyl, (CREON) 12,000 units capsule  Self Yes No   Sig: Take 12,000 units of lipase by mouth 3 (three) times a day with meals     pantoprazole (PROTONIX) 40 mg tablet  Self No No   Sig: Take 1 tablet (40 mg total) by mouth 2 (two) times a day   rosuvastatin (CRESTOR) 20 MG tablet  Self No No   Sig: Take 1 tablet (20 mg total) by mouth daily   traZODone (DESYREL) 50 mg tablet   No No   Sig: Take 1 tablet (50 mg total) by mouth daily at bedtime      Facility-Administered Medications: None       Past Medical History:   Diagnosis Date    Asthma     Chronic pain disorder     GERD (gastroesophageal reflux disease)     Hyperlipidemia     Liver abscess     Meniscus tear     Pancreatitis        Past Surgical History:   Procedure Laterality Date    CHOLECYSTECTOMY      ESOPHAGOGASTRODUODENOSCOPY N/A 11/16/2017    Procedure: ESOPHAGOGASTRODUODENOSCOPY (EGD); Surgeon: Opal Cooks, MD;  [facility]; Service: Gastroenterology    ESOPHAGOGASTRODUODENOSCOPY N/A 4/19/2018    Procedure: ESOPHAGOGASTRODUODENOSCOPY (EGD); Surgeon: Alex Nichole MD;  [facility];   Service: Gastroenterology    KNEE ARTHROSCOPY Bilateral     LIVER SURGERY      PANCREAS SURGERY      stents    CT INJECT NERV BLCK,CELIAC PLEXUS Bilateral 5/9/2017    Procedure: CELIAC PLEXUS BLOCK ;  Surgeon: Katelin Stein MD;  [facility];  Service: Pain Management     CT INJECT NERV BLCK,CELIAC PLEXUS Bilateral 6/1/2017    Procedure: SPLANCHNIC NERVE BLOCK at T12;  Surgeon: Katelin Stein MD;  [facility];  Service: Pain Management     CT INJECT NERV BLCK,CELIAC PLEXUS Bilateral 8/8/2017    Procedure: BILATERAL SPLANCHNIC NERVE BLOCK T12;  Surgeon: Katelin Stein MD;  [facility];  Service: Pain Management     CT INJECT NERV [facility] SYMPATH Bilateral 12/28/2017    Procedure: SPLANCHNIC NERVE BLOCK;  Surgeon: Dorita Gracia MD;  [facility];  Service: Pain Management     MT LAP,CHOLECYSTECTOMY N/A 2/14/2017    Procedure: LAPAROSCOPIC CHOLECYSTECTOMY, IOC, POSSIBLE OPEN ;  Surgeon: Pina Acevedo MD;  [facility];  Service: General    ROTATOR CUFF REPAIR Right     SHOULDER ARTHROSCOPY      SHOULDER ARTHROSCOPY Right        Family History   Problem Relation Age of Onset    Cirrhosis Mother      I have reviewed and agree with the history as documented  Social History   Substance Use Topics    Smoking status: Current Every Day Smoker     Packs/day: 0 50     Years: 20 00    Smokeless tobacco: Never Used    Alcohol use Yes      Comment: rarely        Review of Systems   Constitutional: Negative for diaphoresis, fatigue and fever  HENT: Negative for congestion, ear pain, nosebleeds and sore throat  Eyes: Negative for photophobia, pain, discharge and visual disturbance  Respiratory: Negative for cough, choking, chest tightness, shortness of breath and wheezing  Cardiovascular: Negative for chest pain and palpitations  Gastrointestinal: Positive for abdominal pain  Negative for abdominal distention, diarrhea and vomiting  Genitourinary: Negative for dysuria, flank pain and frequency  Musculoskeletal: Negative for back pain, gait problem and joint swelling  Skin: Negative for color change and rash  Neurological: Negative for dizziness, syncope and headaches  Psychiatric/Behavioral: Negative for behavioral problems and confusion  The patient is not nervous/anxious  All other systems reviewed and are negative        Physical Exam  ED Triage Vitals   Temperature Pulse Respirations Blood Pressure SpO2   05/07/18 2228 05/07/18 2226 05/07/18 2226 05/07/18 2226 05/07/18 2226   99 °F (37 2 °C) 98 20 169/76 96 %      Temp Source Heart Rate Source Patient Position - Orthostatic VS BP Location FiO2 (%)   05/07/18 2228 05/07/18 2226 05/07/18 2226 05/07/18 2226 --   Oral Monitor Sitting Right arm       Pain Score       05/07/18 2226       Worst Possible Pain           Orthostatic Vital Signs  Vitals:    05/08/18 0045 05/08/18 0130 05/08/18 0200 05/08/18 0215   BP: 169/79 140/97 119/89    Pulse: 86 84 81 80   Patient Position - Orthostatic VS:           Physical Exam   Constitutional: He is oriented to person, place, and time  He appears well-developed and well-nourished  HENT:   Head: Normocephalic and atraumatic  Eyes: Pupils are equal, round, and reactive to light  Neck: Normal range of motion  Neck supple  Cardiovascular: Normal rate, regular rhythm, normal heart sounds and normal pulses  PMI is not displaced  Pulmonary/Chest: Effort normal and breath sounds normal  No respiratory distress  Abdominal: Soft  He exhibits no distension  There is tenderness (Localized to the upper abdomen  Greatest over the mid right upper quadrant)  There is no guarding  Musculoskeletal: Normal range of motion  Lymphadenopathy:     He has no cervical adenopathy  Neurological: He is alert and oriented to person, place, and time  Skin: Skin is warm and dry  No rash noted  He is not diaphoretic  No pallor  Psychiatric: He has a normal mood and affect  Vitals reviewed        ED Medications  Medications   sodium chloride 0 9 % bolus 1,000 mL (0 mL Intravenous Stopped 5/8/18 0011)   ondansetron (ZOFRAN) injection 4 mg (4 mg Intravenous Given 5/7/18 2311)   ketorolac (TORADOL) injection 30 mg (30 mg Intravenous Given 5/7/18 2312)   HYDROmorphone (DILAUDID) injection 1 mg (1 mg Intravenous Given 5/7/18 2313)   iohexol (OMNIPAQUE) 350 MG/ML injection (MULTI-DOSE) 100 mL (100 mL Intravenous Given 5/8/18 0000)       Diagnostic Studies  Results Reviewed     Procedure Component Value Units Date/Time    Lactic acid, plasma [[id number]]  (Normal) Collected:  05/07/18 2314    Lab Status:  Final result Specimen:  Blood from Arm, Right Updated:  05/07/18 2348     LACTIC ACID 1 1 mmol/L     Narrative:         Result may be elevated if tourniquet was used during collection  Comprehensive metabolic panel [[id number]]  (Abnormal) Collected:  05/07/18 2314    Lab Status:  Final result Specimen:  Blood from Arm, Right Updated:  05/07/18 2344     Sodium 136 mmol/L      Potassium 4 4 mmol/L      Chloride 100 mmol/L      CO2 26 mmol/L      Anion Gap 10 mmol/L      BUN 13 mg/dL      Creatinine 0 92 mg/dL      Glucose 119 mg/dL      Calcium 9 0 mg/dL      AST 37 U/L      ALT 32 U/L      Alkaline Phosphatase 69 U/L      Total Protein 7 1 g/dL      Albumin 3 9 g/dL      Total Bilirubin 1 60 (H) mg/dL      eGFR 100 ml/min/1 73sq m     Narrative:         National Kidney Disease Education Program recommendations are as follows:  GFR calculation is accurate only with a steady state creatinine  Chronic Kidney disease less than 60 ml/min/1 73 sq  meters  Kidney failure less than 15 ml/min/1 73 sq  meters      Lipase [[id number]]  (Normal) Collected:  05/07/18 2314    Lab Status:  Final result Specimen:  Blood from Arm, Right Updated:  05/07/18 2344     Lipase 328 u/L     CBC and differential [[id number]]  (Abnormal) Collected:  05/07/18 2314    Lab Status:  Final result Specimen:  Blood from Arm, Right Updated:  05/07/18 2325     WBC 16 81 (H) Thousand/uL      RBC 4 81 Million/uL      Hemoglobin 15 4 g/dL      Hematocrit 45 0 %      MCV 94 fL      MCH 32 0 pg      MCHC 34 2 g/dL      RDW 11 9 %      MPV 10 7 fL      Platelets 397 Thousands/uL      nRBC 0 /100 WBCs      Neutrophils Relative 79 (H) %      Lymphocytes Relative 11 (L) %      Monocytes Relative 7 %      Eosinophils Relative 3 %      Basophils Relative 0 %      Neutrophils Absolute 13 25 (H) Thousands/µL      Lymphocytes Absolute 1 82 Thousands/µL      Monocytes Absolute 1 14 Thousand/µL      Eosinophils Absolute 0 43 Thousand/µL      Basophils Absolute 0 07 Thousands/µL CT abdomen pelvis with contrast   Final Result by Zakia Skinner MD (05/08 1970)      Severe progressive or recurrent proximal gastritis with new distal esophagitis  No emphysematous gastritis, abscess, or perforation  New trace bilateral pleural effusions right greater than left  Mild reactive mid small bowel ileus  No other significant interval change  Findings are consistent with the preliminary report from [facility] which was provided shortly after completion of the exam          Workstation performed: [id number]                    Procedures  Procedures       Phone Contacts  ED Phone Contact    ED Course                               MDM  Number of Diagnoses or Management Options  Abdominal pain: new and requires workup  Gastritis: new and requires workup     Amount and/or Complexity of Data Reviewed  Clinical lab tests: reviewed and ordered  Tests in the radiology section of CPT®: reviewed and ordered  Independent visualization of images, tracings, or specimens: yes    Patient Progress  Patient progress: improved    CritCare Time    Disposition  Final diagnoses:   Abdominal pain   Gastritis     Time reflects when diagnosis was documented in both MDM as applicable and the Disposition within this note     Time User Action Codes Description Comment    5/8/2018  2:05 AM Katlin Wilkinson Add [R10 9] Abdominal pain     5/9/2018  6:24 AM Ron Lemus Add [K29 70] Gastritis       ED Disposition     ED Disposition Condition Comment    Discharge  Craig Dear discharge to home/self care      Condition at discharge: Stable        Follow-up Information     Follow up With Specialties Details Why [address] [facility], [address] [facility] [address] [phone]          Discharge Medication List as of 5/8/2018  2:06 AM      CONTINUE these medications which have NOT CHANGED    Details   albuterol (PROVENTIL HFA,VENTOLIN HFA) 90 mcg/act inhaler Inhale 1-2 puffs every 4 (four) hours as needed for wheezing  , Historical Med      !! Choline Fenofibrate (FENOFIBRIC ACID) 135 MG CPDR Take 1 capsule by mouth daily, Starting Tue 9/6/2016, Historical Med      !! choline fenofibrate (TRILIPIX) 135 MG capsule Take 1 capsule (135 mg total) by mouth daily, Starting Tue 4/24/2018, Normal      EPINEPHrine (EPIPEN 2-ARIEL) 0 3 mg/0 3 mL SOAJ EpiPen 2-Ariel 0 3 MG/0 3ML Injection Solution Auto-injector  INJECT 0 3ML INTRAMUSCULARLY AS DIRECTED  Quantity: 1;  Refills: 1      Toro Briceño ; Active  2 Solution Auto-injector Pen, Historical Med      FLAXSEED, LINSEED, PO Take 1,000 mg by mouth 3 (three) times a day, Until Discontinued, Historical Med      niacin (NIASPAN) 500 mg CR tablet Take 1 tablet (500 mg total) by mouth daily with breakfast, Starting Thu 3/8/2018, Normal      omega-3-acid ethyl esters (LOVAZA) 1 g capsule Take 2 capsules (2 g total) by mouth 2 (two) times a day for 90 days, Starting Mon 4/9/2018, Until Sun 7/8/2018, Normal      ondansetron (ZOFRAN-ODT) 4 mg disintegrating tablet Take 1 tablet (4 mg total) by mouth every 8 (eight) hours as needed for nausea or vomiting, Starting Tue 5/1/2018, Print      !! pancrelipase, Lip-Prot-Amyl, (CREON) 12,000 units capsule Take by mouth, Starting Thu 4/13/2017, Historical Med      !!  Pancrelipase, Lip-Prot-Amyl, (CREON) 09607 units CPEP Take 1 capsule (36,000 Units total) by mouth 3 (three) times a day before meals, Starting Mon 3/19/2018, Normal      pantoprazole (PROTONIX) 40 mg tablet Take 1 tablet (40 mg total) by mouth 2 (two) times a day, Starting Fri 3/23/2018, Normal      rosuvastatin (CRESTOR) 20 MG tablet Take 1 tablet (20 mg total) by mouth daily, Starting Mon 3/19/2018, Normal      traZODone (DESYREL) 50 mg tablet Take 1 tablet (50 mg total) by mouth daily at bedtime, Starting Tue 5/1/2018, Normal      aspirin 325 mg tablet Take 1 tablet by mouth daily, Historical Med      Chlorzoxazone (Altamese Sale) 750 MG TABS Take by mouth, Starting Tue 12/26/2017, Historical Med      HYDROmorphone (DILAUDID) 2 mg tablet Take 2 mg by mouth every 4 (four) hours, Starting Tue 8/4/2015, Historical Med      traMADol (ULTRAM) 50 mg tablet Take by mouth Twice daily, Starting Fri 12/1/2017, Historical Med       !! - Potential duplicate medications found  Please discuss with provider  No discharge procedures on file      ED Provider  Electronically Signed by           Mumtaz Solorio  05/09/18 0436

## 2018-05-08 NOTE — DISCHARGE INSTRUCTIONS

## 2018-05-09 ENCOUNTER — APPOINTMENT (INPATIENT)
Dept: MRI IMAGING | Facility: HOSPITAL | Age: 43
DRG: 389 | End: 2018-05-09
Payer: COMMERCIAL

## 2018-05-09 PROBLEM — K20.90 ESOPHAGITIS: Status: ACTIVE | Noted: 2018-05-08

## 2018-05-09 LAB
ALBUMIN SERPL BCP-MCNC: 3 G/DL (ref 3.5–5)
ALP SERPL-CCNC: 50 U/L (ref 46–116)
ALT SERPL W P-5'-P-CCNC: 21 U/L (ref 12–78)
ANION GAP SERPL CALCULATED.3IONS-SCNC: 10 MMOL/L (ref 4–13)
AST SERPL W P-5'-P-CCNC: 15 U/L (ref 5–45)
ATRIAL RATE: 89 BPM
BILIRUB DIRECT SERPL-MCNC: 0.15 MG/DL (ref 0–0.2)
BILIRUB SERPL-MCNC: 0.9 MG/DL (ref 0.2–1)
BUN SERPL-MCNC: 7 MG/DL (ref 5–25)
CALCIUM SERPL-MCNC: 8.3 MG/DL (ref 8.3–10.1)
CHLORIDE SERPL-SCNC: 103 MMOL/L (ref 100–108)
CO2 SERPL-SCNC: 24 MMOL/L (ref 21–32)
CREAT SERPL-MCNC: 0.67 MG/DL (ref 0.6–1.3)
ERYTHROCYTE [DISTWIDTH] IN BLOOD BY AUTOMATED COUNT: 11.9 % (ref 11.6–15.1)
GFR SERPL CREATININE-BSD FRML MDRD: 116 ML/MIN/1.73SQ M
GLUCOSE P FAST SERPL-MCNC: 93 MG/DL (ref 65–99)
GLUCOSE SERPL-MCNC: 93 MG/DL (ref 65–140)
HCT VFR BLD AUTO: 37.7 % (ref 36.5–49.3)
HGB BLD-MCNC: 12.6 G/DL (ref 12–17)
MCH RBC QN AUTO: 32 PG (ref 26.8–34.3)
MCHC RBC AUTO-ENTMCNC: 33.4 G/DL (ref 31.4–37.4)
MCV RBC AUTO: 96 FL (ref 82–98)
P AXIS: 56 DEGREES
PLATELET # BLD AUTO: 140 THOUSANDS/UL (ref 149–390)
PMV BLD AUTO: 10.4 FL (ref 8.9–12.7)
POTASSIUM SERPL-SCNC: 3.6 MMOL/L (ref 3.5–5.3)
PR INTERVAL: 132 MS
PROT SERPL-MCNC: 6.4 G/DL (ref 6.4–8.2)
QRS AXIS: 63 DEGREES
QRSD INTERVAL: 110 MS
QT INTERVAL: 374 MS
QTC INTERVAL: 455 MS
RBC # BLD AUTO: 3.94 MILLION/UL (ref 3.88–5.62)
SODIUM SERPL-SCNC: 137 MMOL/L (ref 136–145)
T WAVE AXIS: 49 DEGREES
TRIGL SERPL-MCNC: 461 MG/DL
VENTRICULAR RATE: 89 BPM
WBC # BLD AUTO: 7.84 THOUSAND/UL (ref 4.31–10.16)

## 2018-05-09 PROCEDURE — 85027 COMPLETE CBC AUTOMATED: CPT | Performed by: INTERNAL MEDICINE

## 2018-05-09 PROCEDURE — C9113 INJ PANTOPRAZOLE SODIUM, VIA: HCPCS | Performed by: PHYSICIAN ASSISTANT

## 2018-05-09 PROCEDURE — 93010 ELECTROCARDIOGRAM REPORT: CPT | Performed by: INTERNAL MEDICINE

## 2018-05-09 PROCEDURE — 80048 BASIC METABOLIC PNL TOTAL CA: CPT | Performed by: INTERNAL MEDICINE

## 2018-05-09 PROCEDURE — 84478 ASSAY OF TRIGLYCERIDES: CPT | Performed by: PHYSICIAN ASSISTANT

## 2018-05-09 PROCEDURE — A9585 GADOBUTROL INJECTION: HCPCS | Performed by: RADIOLOGY

## 2018-05-09 PROCEDURE — 76377 3D RENDER W/INTRP POSTPROCES: CPT

## 2018-05-09 PROCEDURE — 80076 HEPATIC FUNCTION PANEL: CPT | Performed by: PHYSICIAN ASSISTANT

## 2018-05-09 PROCEDURE — 99253 IP/OBS CNSLTJ NEW/EST LOW 45: CPT | Performed by: INTERNAL MEDICINE

## 2018-05-09 PROCEDURE — C9113 INJ PANTOPRAZOLE SODIUM, VIA: HCPCS | Performed by: INTERNAL MEDICINE

## 2018-05-09 PROCEDURE — 99232 SBSQ HOSP IP/OBS MODERATE 35: CPT | Performed by: INTERNAL MEDICINE

## 2018-05-09 PROCEDURE — 74183 MRI ABD W/O CNTR FLWD CNTR: CPT

## 2018-05-09 RX ORDER — SENNOSIDES 8.6 MG
1 TABLET ORAL
Status: DISCONTINUED | OUTPATIENT
Start: 2018-05-09 | End: 2018-05-09

## 2018-05-09 RX ORDER — METOCLOPRAMIDE HYDROCHLORIDE 5 MG/ML
5 INJECTION INTRAMUSCULAR; INTRAVENOUS ONCE
Status: COMPLETED | OUTPATIENT
Start: 2018-05-09 | End: 2018-05-09

## 2018-05-09 RX ORDER — POLYETHYLENE GLYCOL 3350 17 G/17G
17 POWDER, FOR SOLUTION ORAL DAILY
Status: DISCONTINUED | OUTPATIENT
Start: 2018-05-09 | End: 2018-05-09

## 2018-05-09 RX ORDER — PANTOPRAZOLE SODIUM 40 MG/1
40 TABLET, DELAYED RELEASE ORAL
Status: DISCONTINUED | OUTPATIENT
Start: 2018-05-09 | End: 2018-05-09

## 2018-05-09 RX ORDER — SUCRALFATE ORAL 1 G/10ML
1000 SUSPENSION ORAL
Status: COMPLETED | OUTPATIENT
Start: 2018-05-09 | End: 2018-05-10

## 2018-05-09 RX ADMIN — Medication 1000 MG: at 17:48

## 2018-05-09 RX ADMIN — GADOBUTROL 8 ML: 604.72 INJECTION INTRAVENOUS at 13:29

## 2018-05-09 RX ADMIN — FAMOTIDINE 20 MG: 20 TABLET ORAL at 08:47

## 2018-05-09 RX ADMIN — HYDROMORPHONE HYDROCHLORIDE 0.5 MG: 1 INJECTION, SOLUTION INTRAMUSCULAR; INTRAVENOUS; SUBCUTANEOUS at 17:48

## 2018-05-09 RX ADMIN — DOCUSATE SODIUM 100 MG: 100 CAPSULE, LIQUID FILLED ORAL at 09:55

## 2018-05-09 RX ADMIN — HEPARIN SODIUM 5000 UNITS: 5000 INJECTION, SOLUTION INTRAVENOUS; SUBCUTANEOUS at 13:57

## 2018-05-09 RX ADMIN — TRAZODONE HYDROCHLORIDE 50 MG: 50 TABLET ORAL at 21:38

## 2018-05-09 RX ADMIN — PANCRELIPASE 12000 UNITS: 30000; 6000; 19000 CAPSULE, DELAYED RELEASE PELLETS ORAL at 16:16

## 2018-05-09 RX ADMIN — HYDROMORPHONE HYDROCHLORIDE 1 MG: 1 INJECTION, SOLUTION INTRAMUSCULAR; INTRAVENOUS; SUBCUTANEOUS at 21:44

## 2018-05-09 RX ADMIN — PRAVASTATIN SODIUM 40 MG: 40 TABLET ORAL at 17:48

## 2018-05-09 RX ADMIN — POLYETHYLENE GLYCOL 3350 17 G: 17 POWDER, FOR SOLUTION ORAL at 14:01

## 2018-05-09 RX ADMIN — PANTOPRAZOLE SODIUM 40 MG: 40 INJECTION, POWDER, FOR SOLUTION INTRAVENOUS at 08:46

## 2018-05-09 RX ADMIN — SODIUM CHLORIDE 100 ML/HR: 0.9 INJECTION, SOLUTION INTRAVENOUS at 11:44

## 2018-05-09 RX ADMIN — SODIUM CHLORIDE 8 MG/HR: 9 INJECTION, SOLUTION INTRAVENOUS at 16:18

## 2018-05-09 RX ADMIN — HEPARIN SODIUM 5000 UNITS: 5000 INJECTION, SOLUTION INTRAVENOUS; SUBCUTANEOUS at 06:05

## 2018-05-09 RX ADMIN — SODIUM CHLORIDE 100 ML/HR: 0.9 INJECTION, SOLUTION INTRAVENOUS at 01:03

## 2018-05-09 RX ADMIN — HYDROMORPHONE HYDROCHLORIDE 0.2 MG: 1 INJECTION, SOLUTION INTRAMUSCULAR; INTRAVENOUS; SUBCUTANEOUS at 09:55

## 2018-05-09 RX ADMIN — FAMOTIDINE 20 MG: 20 TABLET ORAL at 17:53

## 2018-05-09 RX ADMIN — FENOFIBRATE 145 MG: 145 TABLET, FILM COATED ORAL at 08:47

## 2018-05-09 RX ADMIN — SUCRALFATE 1000 MG: 1 SUSPENSION ORAL at 21:38

## 2018-05-09 RX ADMIN — SUCRALFATE 1000 MG: 1 SUSPENSION ORAL at 17:48

## 2018-05-09 RX ADMIN — HEPARIN SODIUM 5000 UNITS: 5000 INJECTION, SOLUTION INTRAVENOUS; SUBCUTANEOUS at 21:38

## 2018-05-09 RX ADMIN — HYDROMORPHONE HYDROCHLORIDE 1 MG: 1 INJECTION, SOLUTION INTRAMUSCULAR; INTRAVENOUS; SUBCUTANEOUS at 13:55

## 2018-05-09 RX ADMIN — NIACIN 500 MG: 500 TABLET, FILM COATED, EXTENDED RELEASE ORAL at 07:56

## 2018-05-09 RX ADMIN — METOCLOPRAMIDE 5 MG: 5 INJECTION, SOLUTION INTRAMUSCULAR; INTRAVENOUS at 13:47

## 2018-05-09 RX ADMIN — HYDROMORPHONE HYDROCHLORIDE 0.2 MG: 1 INJECTION, SOLUTION INTRAMUSCULAR; INTRAVENOUS; SUBCUTANEOUS at 02:51

## 2018-05-09 RX ADMIN — NICOTINE 1 PATCH: 14 PATCH, EXTENDED RELEASE TRANSDERMAL at 08:52

## 2018-05-09 RX ADMIN — HYDROMORPHONE HYDROCHLORIDE 0.2 MG: 1 INJECTION, SOLUTION INTRAMUSCULAR; INTRAVENOUS; SUBCUTANEOUS at 06:13

## 2018-05-09 NOTE — CASE MANAGEMENT
Initial Clinical Review    Admission: Date/Time/Statement: OBS 5/8   1124    Orders Placed This Encounter   Procedures    Place in Observation (expected length of stay for this patient is less than two midnights)     Standing Status:   Standing     Number of Occurrences:   1     Order Specific Question:   Admitting Physician     Answer:   Eddie Uribe [[id number]]     Order Specific Question:   Level of Care     Answer:   Med Surg [16]         ED: Date/Time/Mode of Arrival:   ED Arrival Information     Expected Arrival Acuity Means of Arrival Escorted By Service Admission Type    - 5/8/2018 08:51 Urgent Walk-In Spouse General Medicine Urgent    Arrival Complaint    CHEST PAIN          Chief Complaint:   Chief Complaint   Patient presents with    Chest Pain     here this morning with abd pain, now going to chest       History of Illness: Delma Pandey is a 37 y o  male medical history significant for gastritis, chronic pancreatitis, chronic pain disorder; he follows up with pain specialist and actually received on May 2nd bilateral splanchnic nerve block, history of hyperlipidemia he presents to the ER with complaint of abdominal pain  States he that it occurred initially over the weekend after having the lasgna  He had actually presented to the ER a day prior, CT scan of the abdomen was done and he was discharged early this morning  He presents back to the ER due persistent the abdominal pain  His last bowel movement was yesterday he states he was liquid  He reports of 1 episode of vomiting  Pain is located in epigastric region, extending bilaterally and up into his chest, he reported of acid reflux  He denies melena, hematemesis  His last EGD was on April 19th, revealing gastritis       ED Vital Signs:   ED Triage Vitals   Temperature Pulse Respirations Blood Pressure SpO2   05/08/18 0857 05/08/18 0857 05/08/18 0857 05/08/18 1100 05/08/18 0857   98 6 °F (37 °C) 85 18 138/93 96 %      Temp Source Heart Rate Source Patient Position - Orthostatic VS BP Location FiO2 (%)   05/08/18 1200 -- 05/08/18 1200 05/08/18 1200 --   Oral  Lying Right arm       Pain Score       05/08/18 0857       9        Wt Readings from Last 1 Encounters:   05/08/18 87 9 kg (193 lb 12 6 oz)       Vital Signs (abnormal): wnl    Abnormal Labs/Diagnostic Test Results: Na  135, total bili 1 50, wbc  12 14    ED Treatment:   Medication Administration from 05/08/2018 0851 to 05/08/2018 1157       Date/Time Order Dose Route Action Action by Comments     05/08/2018 0958 aluminum-magnesium hydroxide-simethicone (MYLANTA) 200-200-20 mg/5 mL oral suspension 30 mL 30 mL Oral Given Tamy Olguin RN      05/08/2018 0958 lidocaine viscous (XYLOCAINE) 2 % mucosal solution 15 mL 15 mL Swish & Spit Given Tamy Olguin RN      05/08/2018 1126 HYDROmorphone (DILAUDID) injection 1 mg 1 mg Intravenous Given Tamy Olguin RN POSS 1          Past Medical/Surgical History: Active Ambulatory Problems     Diagnosis Date Noted    Pancreatic lesion 02/14/2017    Renal mass 03/02/2017    Smoker 03/02/2017    Leukocytosis 11/16/2017    RBBB 11/16/2017    Epigastric pain 11/15/2017    Acute gastritis without hemorrhage 11/15/2017    Abdominal pain 04/18/2018    Elevated liver enzymes 04/18/2018    Chronic pancreatitis ([facility] [address] ) 04/18/2018    Leukopenia 04/18/2018    Hypertriglyceridemia 04/19/2018    Chronic pain syndrome 05/02/2018     Resolved Ambulatory Problems     Diagnosis Date Noted    Positive D dimer 11/16/2017    Chest pain 11/16/2017    Epigastric abdominal pain 11/16/2017    HLD (hyperlipidemia) 11/16/2017     Past Medical History:   Diagnosis Date    Asthma     Chronic pain disorder     GERD (gastroesophageal reflux disease)     Hyperlipidemia     Liver abscess     Meniscus tear     Pancreatitis        Admitting Diagnosis: Gastritis [K29 70]  Chest pain [R07 9]  Esophagitis [K20 9]  Abdominal pain [R10 9]    Age/Sex: 37 y o  male    Assessment/Plan: Hospital Problem List:      Principal Problem:    Abdominal pain  Active Problems:    Leukocytosis    Ileus (HCC)   Plan for the Primary Problem(s):   # Abdominal pain  - known hx of chronic pancreatitis for which he just recently b/l splanic nerve block on 5/2  Recent EGD in feb also noted with gastritis  S/p ct scan noting small bowel ileus    - Consult GI  - NPO  - IV PPI BID, and pt chronically on h2 blocker thus will cont  - Have educated patient given ileus on ct scan aim would be to minimize narcotics thus only place on very minimal dose until input per GI  Patient specifically asking for dilaudid  - IVF hydration  - cont supportive care  - cont creon   # Leukocytosis - likely reactive, already trending down  No focal source of infection  Cont to hold abx   # Hyponatremia, mild - IVF hydration, no change in mental status   Plan for Additional Problems:   # HL - cont meds   Disp: Plan of care extensively discussed with patient and his wife  VTE Prophylaxis: Heparin  / sequential compression device   Code Status: Full code  POLST: There is no POLST form on file for this patient (pre-hospital)     Anticipated Length of Stay:  Patient will be admitted on an Observation basis with an anticipated length of stay of  < 2 midnights     Justification for Hospital Stay: Abdominal pain    Admission Orders:  Scheduled Meds:   Current Facility-Administered Medications:  acetaminophen 650 mg Oral Q6H PRN Idalmis Grams, DO    albuterol 1 puff Inhalation Q4H PRN Idalmis Grams, DO    docusate sodium 100 mg Oral BID PRN Idalmis Grams, DO    famotidine 20 mg Oral BID Idalmis Grams, DO    fenofibrate 145 mg Oral Daily Idalmis Grams, DO    fish oil 1,000 mg Oral BID Idalmis Grams, DO    heparin (porcine) 5,000 Units Subcutaneous Q8H [facility] Idalmis Grams, DO    HYDROmorphone 0 2 mg Intravenous Q4H PRN Idalmis Grams, DO    niacin 500 mg Oral Daily With Breakfast Idalmis Grams, DO nicotine 1 patch Transdermal Daily Tiffany Mcfadden, DO    ondansetron 4 mg Intravenous Q6H PRN Tiffany Mcfadden, DO    pancrelipase (Lip-Prot-Amyl) 12,000 Units Oral TID AC Tiffany Mcfadden DO    pancrelipase (Lip-Prot-Amyl) 36,000 Units Oral TID AC Tiffany Mcfadden DO    pantoprazole 40 mg Intravenous Q12H [facility] Tiffany Mcfadden DO    pravastatin 40 mg Oral Daily With Dinner Tiffany Mcfadden DO    sodium chloride 100 mL/hr Intravenous Continuous Tiffany Mcfadden DO Last Rate: 100 mL/hr (05/09/18 0103)   traZODone 50 mg Oral HS Tiffany Mcfadden DO      Facility-Administered Medications Ordered in Other Encounters:  oxyCODONE-acetaminophen 2 tablet Oral Once Renzo Parker MD     Continuous Infusions:   sodium chloride 100 mL/hr Last Rate: 100 mL/hr (05/09/18 0103)     PRN Meds:   acetaminophen    albuterol    docusate sodium    HYDROmorphone  q4h prn  x4    ondansetron    NPO   SCD  Up and OOB as [place]by   GI consult  5/9 cbc , bmp  plt  140

## 2018-05-09 NOTE — CASE MANAGEMENT
Continued Stay Review    Date: 05/09/18    Vital Signs: /61 (BP Location: Left arm)   Pulse 77   Temp 97 6 °F (36 4 °C) (Oral)   Resp 18   Ht 5' 11" (1 803 m)   Wt 87 9 kg (193 lb 12 6 oz)   SpO2 96%   BMI 27 03 kg/m²     Medications:   Scheduled Meds:   Current Facility-Administered Medications:  acetaminophen 650 mg Oral Q6H PRN   albuterol 1 puff Inhalation Q4H PRN   docusate sodium 100 mg Oral BID PRN   famotidine 20 mg Oral BID   fenofibrate 145 mg Oral Daily   fish oil 1,000 mg Oral BID   heparin (porcine) 5,000 Units Subcutaneous Q8H [facility]   HYDROmorphone 0 5 mg Intravenous Q4H PRN   HYDROmorphone 1 mg Intravenous Q4H PRN   niacin 500 mg Oral Daily With Breakfast   nicotine 1 patch Transdermal Daily   ondansetron 4 mg Intravenous Q6H PRN   pancrelipase (Lip-Prot-Amyl) 12,000 Units Oral PRN   pancrelipase (Lip-Prot-Amyl) 36,000 Units Oral TID AC   pantoprazole 40 mg Oral BID AC   polyethylene glycol 17 g Oral Daily   pravastatin 40 mg Oral Daily With Dinner   senna 1 tablet Oral HS   sodium chloride 100 mL/hr Intravenous Continuous   traZODone 50 mg Oral HS     Facility-Administered Medications Ordered in Other Encounters:  oxyCODONE-acetaminophen 2 tablet Oral Once Ismael Brown MD     Continuous Infusions:   sodium chloride 100 mL/hr Last Rate: 100 mL/hr (05/09/18 1144)     PRN Meds:   acetaminophen    albuterol    docusate sodium    HYDROmorphone    HYDROmorphone    ondansetron    pancrelipase (Lip-Prot-Amyl)    Abnormal Labs/Diagnostic Results:   PLATELETS 066*     Albumin 3 0       Triglycerides 461       MRI Abd pending    Age/Sex: 37 y o  male     Assessment:     Principal Problem:    Abdominal pain  Active Problems:    Leukocytosis    Ileus (HCC)        Plan:     # Abdominal pain  - known hx of chronic pancreatitis for which he just recently b/l splanic nerve block on 5/2   Recent EGD in feb also noted with gastritis   S/p ct scan noting small bowel ileus    - GI consulted  - Remains NPO, can consider clears, will await GI input  - IV PPI BID, and pt chronically on h2 blocker thus will cont  - Have educated patient given ileus on ct scan aim would be to minimize narcotics thus only place on very minimal dose until input per GI    - IVF hydration  - cont supportive care     # Chronic pancreatitits, chronic pain syndrome s/p recent b/l splanic nerve block on 5/2     # Leukocytosis - likely reactive, resolved  No source of infxn     # Hyponatremia, resolved     # HL - cont med     VTE Pharmacologic Prophylaxis:   Pharmacologic: Heparin  Mechanical VTE Prophylaxis in Place: Yes     Current Patient Status: Inpatient   Certification Statement: The patient will continue to require additional inpatient hospital stay due to abd pain       Discharge Plan: TBD      Thank you,  [facility] in the [employer] for 2017  Network Utilization Review Department  Phone: [phone]; Fax [phone]  ATTENTION: The Network Utilization Review Department is now centralized for our 7 Facilities  Make a note that we have a new phone and fax numbers for our Department  Please call with any questions or concerns to [phone] and carefully follow the prompts so that you are directed to the right person  All voicemails are confidential  Fax any determinations, approvals, denials, and requests for initial or continue stay review clinical to [phone]  Due to HIGH CALL volume, it would be easier if you could please send faxed requests to expedite your requests and in part, help us provide discharge notifications faster

## 2018-05-09 NOTE — CONSULTS
Consultation - [address] [facility]  Hume Sara 37 y o  male MRN: [id number]  Unit/Bed#: -01 Encounter: [id number]        Inpatient consult to gastroenterology  Consult performed by: Yessy Hinton ordered by: Jenn Horn          Reason for Consult / Principal Problem: Abdominal Pain    HPI: Mr Diana Castillo is a 36 yo M with a PMH of familial hypertriglyceridemia, hx severe pancreatitis a few years ago complicated by pseudocyst requiring cyst gastrostomy which was then complicated further by development of liver abscess, chronic pancreatitis requiring intermittent nerve blocks for relief of pain and creon, presenting with acute onset of upper abdominal pain radiating into the chest and down bilateral flanks after ingestion of lasagna Bart night  He had one associated episode of nonbloody emesis and an episode of diarrhea  No further bowel movements or episodes of vomiting since Monday  He was recently hospitalized from 4/18-4/20 with epigastric pain associated with nausea/vomiting as well as significant transaminitis which was likely 2/2 ischemic hepatitis v DILI  He had an EGD on 4/19 to evaluate the severe, chronic gastritis noted on CT and he was found to have a relatively normal appearing esophagus and mild gastritis for which biopsies were negative  The plan was for outpatient EUS for further evaluation of the external stomach wall  He was seen in follow up on 5/1, overall feeling better since hospitalization but still with residual pain  He feels this pain is much worse than the pain he was experiencing on his last admission  He denies any other changes recently such as medication changes  He is going to be scheduled for another bilateral splanchnic nerve block with pain management      REVIEW OF SYSTEMS:     Historical Information   Past Medical History:   Diagnosis Date    Asthma     Chronic pain disorder     GERD (gastroesophageal reflux disease)     Hyperlipidemia  Liver abscess     Meniscus tear     Pancreatitis      Past Surgical History:   Procedure Laterality Date    CHOLECYSTECTOMY      ESOPHAGOGASTRODUODENOSCOPY N/A 11/16/2017    Procedure: ESOPHAGOGASTRODUODENOSCOPY (EGD); Surgeon: Mavis Castorena MD;  Location: [facility]; Service: Gastroenterology    ESOPHAGOGASTRODUODENOSCOPY N/A 4/19/2018    Procedure: ESOPHAGOGASTRODUODENOSCOPY (EGD); Surgeon: Merlin Lulas, MD;  Location: [facility];   Service: Gastroenterology    KNEE ARTHROSCOPY Bilateral     LIVER SURGERY      PANCREAS SURGERY      stents    LA INJECT NERV BLCK,CELIAC PLEXUS Bilateral 5/9/2017    Procedure: CELIAC PLEXUS BLOCK ;  Surgeon: Shanta Lopez MD;  [facility];  Service: Pain Management     LA INJECT NERV BLCK,CELIAC PLEXUS Bilateral 6/1/2017    Procedure: SPLANCHNIC NERVE BLOCK at T12;  Surgeon: Shanta Lopez MD;  [facility];  Service: Pain Management     LA INJECT NERV BLCK,CELIAC PLEXUS Bilateral 8/8/2017    Procedure: BILATERAL SPLANCHNIC NERVE BLOCK T12;  Surgeon: Shanta Lopez MD;  [facility];  Service: Pain Management     LA INJECT NERV Donalee Clubs Bilateral 12/28/2017    Procedure: SPLANCHNIC NERVE BLOCK;  Surgeon: Shanta Lopez MD;  [facility];  Service: Pain Management     LA LAP,CHOLECYSTECTOMY N/A 2/14/2017    Procedure: LAPAROSCOPIC CHOLECYSTECTOMY, IOC, POSSIBLE OPEN ;  Surgeon: Carroll Cockayne, MD;  [facility];  Service: General    ROTATOR CUFF REPAIR Right     SHOULDER ARTHROSCOPY      SHOULDER ARTHROSCOPY Right      Social History   History   Alcohol Use    Yes     Comment: rarely     History   Drug Use No     History   Smoking Status    Current Every Day Smoker    Packs/day: 0 50    Years: 20 00   Smokeless Tobacco    Never Used     Family History   Problem Relation Age of Onset    Cirrhosis Mother        Meds/Allergies     Prescriptions Prior to Admission   Medication    ranitidine (ZANTAC) 150 MG capsule    albuterol (PROVENTIL HFA,VENTOLIN HFA) 90 mcg/act inhaler    Choline Fenofibrate (FENOFIBRIC ACID) 135 MG CPDR    choline fenofibrate (TRILIPIX) 135 MG capsule    EPINEPHrine (EPIPEN 2-ARIEL) 0 3 mg/0 3 mL SOAJ    FLAXSEED, LINSEED, PO    niacin (NIASPAN) 500 mg CR tablet    omega-3-acid ethyl esters (LOVAZA) 1 g capsule    ondansetron (ZOFRAN-ODT) 4 mg disintegrating tablet    pancrelipase, Lip-Prot-Amyl, (CREON) 12,000 units capsule    Pancrelipase, Lip-Prot-Amyl, (CREON) 78720 units CPEP    pantoprazole (PROTONIX) 40 mg tablet    rosuvastatin (CRESTOR) 20 MG tablet    traZODone (DESYREL) 50 mg tablet     Current Facility-Administered Medications   Medication Dose Route Frequency    acetaminophen (TYLENOL) tablet 650 mg  650 mg Oral Q6H PRN    albuterol (PROVENTIL HFA,VENTOLIN HFA) inhaler 1 puff  1 puff Inhalation Q4H PRN    docusate sodium (COLACE) capsule 100 mg  100 mg Oral BID PRN    famotidine (PEPCID) tablet 20 mg  20 mg Oral BID    fenofibrate (TRICOR) tablet 145 mg  145 mg Oral Daily    fish oil capsule 1,000 mg  1,000 mg Oral BID    heparin (porcine) subcutaneous injection 5,000 Units  5,000 Units Subcutaneous Q8H [address]    HYDROmorphone (DILAUDID) injection 0 2 mg  0 2 mg Intravenous Q4H PRN    metoclopramide (REGLAN) injection 5 mg  5 mg Intravenous Once    niacin (NIASPAN) CR tablet 500 mg  500 mg Oral Daily With Breakfast    nicotine (NICODERM CQ) 14 mg/24hr TD 24 hr patch 1 patch  1 patch Transdermal Daily    ondansetron (ZOFRAN) injection 4 mg  4 mg Intravenous Q6H PRN    pancrelipase (Lip-Prot-Amyl) (CREON) delayed release capsule 12,000 Units  12,000 Units Oral PRN    pancrelipase (Lip-Prot-Amyl) (CREON) delayed release capsule 36,000 Units  36,000 Units Oral TID AC    pantoprazole (PROTONIX) EC tablet 40 mg  40 mg Oral BID AC    pravastatin (PRAVACHOL) tablet 40 mg  40 mg Oral Daily With Dinner    sodium chloride 0 9 % infusion  100 mL/hr Intravenous Continuous    traZODone (DESYREL) tablet 50 mg  50 mg Oral HS     Facility-Administered Medications Ordered in Other Encounters   Medication Dose Route Frequency    oxyCODONE-acetaminophen (PERCOCET) 5-325 mg per tablet 2 tablet  2 tablet Oral Once       Allergies   Allergen Reactions    Bee Venom Swelling           Objective     Blood pressure 128/61, pulse 77, temperature 97 6 °F (36 4 °C), temperature source Oral, resp  rate 18, height 5' 11" (1 803 m), weight 87 9 kg (193 lb 12 6 oz), SpO2 96 %        Intake/Output Summary (Last 24 hours) at 05/09/18 1224  Last data filed at 05/09/18 4143   Gross per 24 hour   Intake              980 ml   Output             1650 ml   Net             -670 ml         PHYSICAL EXAM:      General Appearance:   Alert, oriented x3, appears to be in moderate distress 2/2 abdominal pain    HEENT:   Normocephalic, atraumatic   Neck:  Supple, symmetrical, trachea midline   Lungs:   Clear to auscultation bilaterally, no respiratory distress   Heart[de-identified]   RRR, no murmur   Abdomen:   Non-distended, soft, BS hypoactive, (+) generalized TTP   Rectal:   Deferred    Extremities:  No cyanosis or LE edema    Pulses:  2+ and symmetric all extremities    Skin:  No jaundice or pallor     Lab Results:     Results from last 7 days  Lab Units 05/09/18  0526 05/08/18  0957   WBC Thousand/uL 7 84 12 14*   HEMOGLOBIN g/dL 12 6 14 6   HEMATOCRIT % 37 7 43 1   PLATELETS Thousands/uL 140* 171   NEUTROS PCT %  --  74   LYMPHS PCT %  --  12*   MONOS PCT %  --  7   EOS PCT %  --  6       Results from last 7 days  Lab Units 05/09/18  0526 05/08/18  0957   SODIUM mmol/L 137 135*   POTASSIUM mmol/L 3 6 3 7   CHLORIDE mmol/L 103 102   CO2 mmol/L 24 29   BUN mg/dL 7 10   CREATININE mg/dL 0 67 0 89   CALCIUM mg/dL 8 3 8 9   TOTAL PROTEIN g/dL  --  6 8   BILIRUBIN TOTAL mg/dL  --  1 50*   ALK PHOS U/L  --  61   ALT U/L  --  27   AST U/L  --  15   GLUCOSE RANDOM mg/dL 93 121           Results from last 7 days  Lab Units 05/08/18  0957   LIPASE u/L 172       Imaging Studies: I have personally reviewed pertinent imaging studies  No results found  ASSESSMENT and PLAN:      Abdominal Pain  Small Bowel Ileus  - CT A/P on admission shows hepatomegaly, s/p cholecystectomy, hypoplastic tail of pancreas, gastritis and GE thickening, slightly prominent tissue stranding/thickening from the inferior gastric cardia toward superior distal pancreatic body, small bowel ileus noted which is felt to be reactive from severe progressive or recurrent gastritis  - Recent EGD on 4/19 showed normal esophagus, normal GE junction, and mild gastritis; these findings do not correlate with the chronic findings of severe gastritis and esophagitis on CT scan  - Labs on admission showed Tbili of 1 6, normal lipase, leukocytosis of 16 81 which has resolved, normal Hb, negative troponin  - Check TG, check LFTS today with direct bili  - MRI/MRCP to evaluate biliary tree and pancreatic tissue further due to findings of soft tissue prominence between the gastric cardia and pancreas in the setting of recent significant transaminitis; this could be unrelated but he is having recurrent, severe pain of unclear etiology  - Keep NPO for now for bowel rest due to small bowel ileus noted on CT; this is likely reactive to another process, hence MRI evaluation  - Trial reglan  - IVF hydration, supportive care  - Would recommend increasing pain control to help alleviate his pain with subsequent limiting of narcotics as this will make his ileus worse    Chronic Pancreatitis  - Previously 2/2 hypertriglyceridemia and possible alcohol component  - Check TG as above  - Creon supplements only when patient is taking PO; hold if patient is NPO      Patient will be seen and examined by Dr Madalyn Mayfield  All rod medical decisions were made by Dr Madalyn Mayfield  Thank you for allowing us to participate in the care of this patient  We will follow with you closely

## 2018-05-09 NOTE — PROGRESS NOTES
[address] Internal Medicine Progress Note  Patient: Vineet Ceja 37 y o  male   MRN: [id number]  PCP: Yosvany Conley MD  Unit/Bed#: [facility] [address] Encounter: [id number]  Date Of Visit: 05/09/18    Assessment:    Principal Problem:    Abdominal pain  Active Problems:    Leukocytosis    Ileus ([facility] [address] )      Plan:    # Abdominal pain  - known hx of chronic pancreatitis for which he just recently b/l splanic nerve block on 5/2  Recent EGD in feb also noted with gastritis  S/p ct scan noting small bowel ileus    - GI consulted  - Remains NPO, can consider clears, will await GI input  - IV PPI BID, and pt chronically on h2 blocker thus will cont  - Have educated patient given ileus on ct scan aim would be to minimize narcotics thus only place on very minimal dose until input per GI    - IVF hydration  - cont supportive care    # Chronic pancreatitits, chronic pain syndrome s/p recent b/l splanic nerve block on 5/2     # Leukocytosis - likely reactive, resolved  No source of infxn     # Hyponatremia, resolved     # HL - cont med     VTE Pharmacologic Prophylaxis:   Pharmacologic: Heparin  Mechanical VTE Prophylaxis in Place: Yes    Patient Centered Rounds: I have performed bedside rounds with nursing staff today  Discussions with Specialists or Other Care Team Provider:     Education and Discussions with Family / Patient     Time Spent for Care: 30 minutes  More than 50% of total time spent on counseling and coordination of care as described above      Current Length of Stay: 0 day(s)    Current Patient Status: Inpatient   Certification Statement: The patient will continue to require additional inpatient hospital stay due to abd pain    Discharge Plan / Estimated Discharge Date:     Code Status: Level 1 - Full Code      Subjective:   Persistent abdominal pain states radiates in left and right upper quadrants (most prominent on the left); c/o nausea but no vomiting, last bm on Monday, + flatus    Objective:     Vitals:   Temp (24hrs), Av 4 °F (36 9 °C), Min:97 6 °F (36 4 °C), Max:98 8 °F (37 1 °C)    HR:  [74-86] 77  Resp:  [14-20] 18  BP: (120-152)/(61-93) 128/61  SpO2:  [94 %-96 %] 96 %  Body mass index is 27 03 kg/m²  Input and Output Summary (last 24 hours): Intake/Output Summary (Last 24 hours) at 18 1029  Last data filed at 18 0838   Gross per 24 hour   Intake              980 ml   Output             1650 ml   Net             -670 ml       Physical Exam:     Physical Exam   Constitutional: He is oriented to person, place, and time  He appears well-developed and well-nourished  Neck: Neck supple  Cardiovascular: Normal rate, regular rhythm, normal heart sounds and intact distal pulses  Exam reveals no gallop and no friction rub  No murmur heard  Pulmonary/Chest: Effort normal and breath sounds normal  No respiratory distress  He has no wheezes  He has no rales  He exhibits no tenderness  Abdominal: Soft  Bowel sounds are normal  He exhibits no distension and no mass  There is tenderness  There is no rebound and no guarding  In epigastric and LUQ   Musculoskeletal: Normal range of motion  He exhibits no edema, tenderness or deformity  Neurological: He is alert and oriented to person, place, and time  He has normal reflexes  He displays normal reflexes  No cranial nerve deficit  He exhibits normal muscle tone  Coordination normal    Skin: Skin is warm and dry         Additional Data:     Labs:      Results from last 7 days  Lab Units 18  0526 18  0957   WBC Thousand/uL 7 84 12 14*   HEMOGLOBIN g/dL 12 6 14 6   HEMATOCRIT % 37 7 43 1   PLATELETS Thousands/uL 140* 171   NEUTROS PCT %  --  74   LYMPHS PCT %  --  12*   MONOS PCT %  --  7   EOS PCT %  --  6       Results from last 7 days  Lab Units 18  0526 18  0957   SODIUM mmol/L 137 135*   POTASSIUM mmol/L 3 6 3 7   CHLORIDE mmol/L 103 102   CO2 mmol/L 24 29   BUN mg/dL 7 10   CREATININE mg/dL 0 67 0 89   CALCIUM mg/dL 8 3 8  9   TOTAL PROTEIN g/dL  --  6 8   BILIRUBIN TOTAL mg/dL  --  1 50*   ALK PHOS U/L  --  61   ALT U/L  --  27   AST U/L  --  15   GLUCOSE RANDOM mg/dL 93 121           * I Have Reviewed All Lab Data Listed Above  * Additional Pertinent Lab Tests Reviewed: All Labs Within Last 24 Hours Reviewed    Imaging:    Imaging Reports Reviewed Today Include:   Imaging Personally Reviewed by Myself Includes:      Recent Cultures (last 7 days):           Last 24 Hours Medication List:     Current Facility-Administered Medications:  acetaminophen 650 mg Oral Q6H PRN Sherill Edwards, DO    albuterol 1 puff Inhalation Q4H PRN Sherill Edwards, DO    docusate sodium 100 mg Oral BID PRN Sherill Edwards, DO    famotidine 20 mg Oral BID Sherill Edwards, DO    fenofibrate 145 mg Oral Daily Sherill Edwards, DO    fish oil 1,000 mg Oral BID Sherill Edwards, DO    heparin (porcine) 5,000 Units Subcutaneous Q8H [address] Sherill Edwards, DO    HYDROmorphone 0 2 mg Intravenous Q4H PRN Sherill Edwards, DO    niacin 500 mg Oral Daily With Breakfast Sherill Edwards, DO    nicotine 1 patch Transdermal Daily Sherill Edwards, DO    ondansetron 4 mg Intravenous Q6H PRN Sherill Edwards, DO    pancrelipase (Lip-Prot-Amyl) 12,000 Units Oral TID AC Sherill Edwards, DO    pancrelipase (Lip-Prot-Amyl) 36,000 Units Oral TID AC Sherill Edwards, DO    pantoprazole 40 mg Oral BID AC Sherill Edwards, DO    pravastatin 40 mg Oral Daily With Dinner Sherill Jerry, DO    sodium chloride 100 mL/hr Intravenous Continuous Chaparro Edwards, DO Last Rate: 100 mL/hr (05/09/18 0103)   traZODone 50 mg Oral HS Sherill Jerry, DO      Facility-Administered Medications Ordered in Other Encounters:  oxyCODONE-acetaminophen 2 tablet Oral Once Latosha Recinos MD        Today, Patient Was Seen By: Chaparro Edwards DO    ** Please Note: This note has been constructed using a voice recognition system   **

## 2018-05-09 NOTE — PROGRESS NOTES
The pantoprazole has / have been converted to Oral per [facility] Protocol for Adults as approved by the Pharmacy and Therapeutics Committee  The patient met all eligible criteria:  3 Age = 23years old   2) Received at least one dose of the IV form   3) Receiving at least one other scheduled oral/enteral medication   4) Tolerating an oral/enteral diet   and did not have any exclusions:   1) Critical care patient   2) Active GI bleed (IF assessing H2RAs or PPIs)   3) Continuous tube feeding (IF assessing cipro, doxycycline, levofloxacin, minocycline, rifampin, or voriconazole)   4) Receiving PO vancomycin (IF assessing metronidazole)   5) Persistent nausea and/or vomiting   6) Ileus or gastrointestinal obstruction   7) Kaleigh/nasogastric tube set for continuous suction   8) Specific order not to automatically convert to PO (in the order's comments or if discussed in the most recent Infectious Disease or primary team's progress notes)

## 2018-05-09 NOTE — PLAN OF CARE
Problem: DISCHARGE PLANNING - CARE MANAGEMENT  Goal: Discharge to post-acute care or home with appropriate resources  INTERVENTIONS:  - Conduct assessment to determine patient/family and health care team treatment goals, and need for post-acute services based on payer coverage, community resources, and patient preferences, and barriers to discharge  - Address psychosocial, clinical, and financial barriers to discharge as identified in assessment in conjunction with the patient/family and health care team  - Arrange appropriate level of post-acute services according to patients   needs and preference and payer coverage in collaboration with the physician and health care team  - Communicate with and update the patient/family, physician, and health care team regarding progress on the discharge plan  - Arrange appropriate transportation to post-acute venues  Outcome: Progressing  CM met with pt at bedside  CM reviewed obs notification  Pt was agreeable and signed  CM provided copy and placed original in medical records  CM reviewed discharge planning process including the following: identifying help at home, patient preference for discharge planning needs, pharmacy preference, and availability of treatment team to discuss questions or concerns patient and/or family may have regarding understanding medications and recognizing signs and symptoms once discharged  CM also encouraged patient to follow up with all recommended appointments after discharge  Patient advised of importance for patient and family to participate in managing patients medical well being  CM name and role reviewed and Discharge Checklist provided  Encouraged patient and caregiver to review prior to discharge

## 2018-05-10 ENCOUNTER — ANESTHESIA EVENT (INPATIENT)
Dept: PERIOP | Facility: HOSPITAL | Age: 43
DRG: 389 | End: 2018-05-10
Payer: COMMERCIAL

## 2018-05-10 ENCOUNTER — ANESTHESIA (INPATIENT)
Dept: PERIOP | Facility: HOSPITAL | Age: 43
DRG: 389 | End: 2018-05-10
Payer: COMMERCIAL

## 2018-05-10 LAB
ALBUMIN SERPL BCP-MCNC: 2.9 G/DL (ref 3.5–5)
ALP SERPL-CCNC: 48 U/L (ref 46–116)
ALT SERPL W P-5'-P-CCNC: 23 U/L (ref 12–78)
ANION GAP SERPL CALCULATED.3IONS-SCNC: 9 MMOL/L (ref 4–13)
AST SERPL W P-5'-P-CCNC: 15 U/L (ref 5–45)
BILIRUB SERPL-MCNC: 0.5 MG/DL (ref 0.2–1)
BUN SERPL-MCNC: 7 MG/DL (ref 5–25)
CALCIUM SERPL-MCNC: 8.8 MG/DL (ref 8.3–10.1)
CHLORIDE SERPL-SCNC: 103 MMOL/L (ref 100–108)
CO2 SERPL-SCNC: 27 MMOL/L (ref 21–32)
CREAT SERPL-MCNC: 0.67 MG/DL (ref 0.6–1.3)
GFR SERPL CREATININE-BSD FRML MDRD: 116 ML/MIN/1.73SQ M
GLUCOSE SERPL-MCNC: 87 MG/DL (ref 65–140)
POTASSIUM SERPL-SCNC: 3.7 MMOL/L (ref 3.5–5.3)
PROT SERPL-MCNC: 6.5 G/DL (ref 6.4–8.2)
SODIUM SERPL-SCNC: 139 MMOL/L (ref 136–145)

## 2018-05-10 PROCEDURE — C9113 INJ PANTOPRAZOLE SODIUM, VIA: HCPCS | Performed by: PHYSICIAN ASSISTANT

## 2018-05-10 PROCEDURE — 99232 SBSQ HOSP IP/OBS MODERATE 35: CPT | Performed by: INTERNAL MEDICINE

## 2018-05-10 PROCEDURE — 0DJ08ZZ INSPECTION OF UPPER INTESTINAL TRACT, VIA NATURAL OR ARTIFICIAL OPENING ENDOSCOPIC: ICD-10-PCS | Performed by: INTERNAL MEDICINE

## 2018-05-10 PROCEDURE — 43235 EGD DIAGNOSTIC BRUSH WASH: CPT | Performed by: INTERNAL MEDICINE

## 2018-05-10 PROCEDURE — 80053 COMPREHEN METABOLIC PANEL: CPT | Performed by: INTERNAL MEDICINE

## 2018-05-10 RX ORDER — PROPOFOL 10 MG/ML
INJECTION, EMULSION INTRAVENOUS AS NEEDED
Status: DISCONTINUED | OUTPATIENT
Start: 2018-05-10 | End: 2018-05-10 | Stop reason: SURG

## 2018-05-10 RX ORDER — SUCRALFATE ORAL 1 G/10ML
1000 SUSPENSION ORAL
Status: DISCONTINUED | OUTPATIENT
Start: 2018-05-10 | End: 2018-05-10

## 2018-05-10 RX ORDER — LIDOCAINE HYDROCHLORIDE 10 MG/ML
INJECTION, SOLUTION INFILTRATION; PERINEURAL AS NEEDED
Status: DISCONTINUED | OUTPATIENT
Start: 2018-05-10 | End: 2018-05-10 | Stop reason: SURG

## 2018-05-10 RX ORDER — PANTOPRAZOLE SODIUM 40 MG/1
40 TABLET, DELAYED RELEASE ORAL
Status: DISCONTINUED | OUTPATIENT
Start: 2018-05-10 | End: 2018-05-11 | Stop reason: HOSPADM

## 2018-05-10 RX ORDER — SUCRALFATE ORAL 1 G/10ML
1000 SUSPENSION ORAL
Status: DISCONTINUED | OUTPATIENT
Start: 2018-05-10 | End: 2018-05-11 | Stop reason: HOSPADM

## 2018-05-10 RX ADMIN — PROPOFOL 120 MG: 10 INJECTION, EMULSION INTRAVENOUS at 13:08

## 2018-05-10 RX ADMIN — HYDROMORPHONE HYDROCHLORIDE 0.5 MG: 1 INJECTION, SOLUTION INTRAMUSCULAR; INTRAVENOUS; SUBCUTANEOUS at 07:21

## 2018-05-10 RX ADMIN — TRAZODONE HYDROCHLORIDE 50 MG: 50 TABLET ORAL at 22:39

## 2018-05-10 RX ADMIN — HYDROMORPHONE HYDROCHLORIDE 1 MG: 1 INJECTION, SOLUTION INTRAMUSCULAR; INTRAVENOUS; SUBCUTANEOUS at 22:39

## 2018-05-10 RX ADMIN — PRAVASTATIN SODIUM 40 MG: 40 TABLET ORAL at 15:30

## 2018-05-10 RX ADMIN — HEPARIN SODIUM 5000 UNITS: 5000 INJECTION, SOLUTION INTRAVENOUS; SUBCUTANEOUS at 06:00

## 2018-05-10 RX ADMIN — HYDROMORPHONE HYDROCHLORIDE 1 MG: 1 INJECTION, SOLUTION INTRAMUSCULAR; INTRAVENOUS; SUBCUTANEOUS at 08:48

## 2018-05-10 RX ADMIN — LIDOCAINE HYDROCHLORIDE 100 MG: 10 INJECTION, SOLUTION INFILTRATION; PERINEURAL at 13:08

## 2018-05-10 RX ADMIN — Medication 1000 MG: at 17:18

## 2018-05-10 RX ADMIN — FAMOTIDINE 20 MG: 20 TABLET ORAL at 08:48

## 2018-05-10 RX ADMIN — SODIUM CHLORIDE 100 ML/HR: 0.9 INJECTION, SOLUTION INTRAVENOUS at 02:20

## 2018-05-10 RX ADMIN — SUCRALFATE 1000 MG: 1 SUSPENSION ORAL at 22:39

## 2018-05-10 RX ADMIN — SODIUM CHLORIDE 100 ML/HR: 0.9 INJECTION, SOLUTION INTRAVENOUS at 15:50

## 2018-05-10 RX ADMIN — SODIUM CHLORIDE 8 MG/HR: 9 INJECTION, SOLUTION INTRAVENOUS at 02:22

## 2018-05-10 RX ADMIN — PANTOPRAZOLE SODIUM 40 MG: 40 TABLET, DELAYED RELEASE ORAL at 17:18

## 2018-05-10 RX ADMIN — PANCRELIPASE 12000 UNITS: 30000; 6000; 19000 CAPSULE, DELAYED RELEASE PELLETS ORAL at 15:30

## 2018-05-10 RX ADMIN — HYDROMORPHONE HYDROCHLORIDE 1 MG: 1 INJECTION, SOLUTION INTRAMUSCULAR; INTRAVENOUS; SUBCUTANEOUS at 13:56

## 2018-05-10 RX ADMIN — HEPARIN SODIUM 5000 UNITS: 5000 INJECTION, SOLUTION INTRAVENOUS; SUBCUTANEOUS at 14:12

## 2018-05-10 RX ADMIN — HYDROMORPHONE HYDROCHLORIDE 0.5 MG: 1 INJECTION, SOLUTION INTRAMUSCULAR; INTRAVENOUS; SUBCUTANEOUS at 03:06

## 2018-05-10 RX ADMIN — Medication 1000 MG: at 08:47

## 2018-05-10 RX ADMIN — FENOFIBRATE 145 MG: 145 TABLET, FILM COATED ORAL at 08:48

## 2018-05-10 RX ADMIN — FAMOTIDINE 20 MG: 20 TABLET ORAL at 17:19

## 2018-05-10 RX ADMIN — SUCRALFATE 1000 MG: 1 SUSPENSION ORAL at 06:40

## 2018-05-10 RX ADMIN — ONDANSETRON 4 MG: 2 INJECTION INTRAMUSCULAR; INTRAVENOUS at 21:58

## 2018-05-10 RX ADMIN — NICOTINE 1 PATCH: 14 PATCH, EXTENDED RELEASE TRANSDERMAL at 08:48

## 2018-05-10 RX ADMIN — HEPARIN SODIUM 5000 UNITS: 5000 INJECTION, SOLUTION INTRAVENOUS; SUBCUTANEOUS at 22:39

## 2018-05-10 RX ADMIN — SUCRALFATE 1000 MG: 1 SUSPENSION ORAL at 17:18

## 2018-05-10 RX ADMIN — HYDROMORPHONE HYDROCHLORIDE 1 MG: 1 INJECTION, SOLUTION INTRAMUSCULAR; INTRAVENOUS; SUBCUTANEOUS at 18:39

## 2018-05-10 RX ADMIN — HYDROMORPHONE HYDROCHLORIDE 0.5 MG: 1 INJECTION, SOLUTION INTRAMUSCULAR; INTRAVENOUS; SUBCUTANEOUS at 15:30

## 2018-05-10 NOTE — PROGRESS NOTES
[address] Internal Medicine Progress Note  Patient: Anastasiia East 37 y o  male   MRN: [id number]  PCP: Regi Up MD  Unit/Bed#: -33 Encounter: [id number]  Date Of Visit: 05/10/18    Assessment:    Principal Problem:    Abdominal pain  Active Problems:    Leukocytosis    Ileus ([address] )    Esophagitis      Plan:    # Abdominal pain  - known hx of chronic pancreatitis for which he just recently b/l splanic nerve block on 5/2  Recent EGD in feb also noted with gastritis  S/p ct scan noting small bowel ileus    - GI on board  - s/p MRI revealing extensive thickening in GE junction and suggesting eval for nubia galindo tear, thus plan for ED today  - Diet advanced to clears  - on protonix gtt, and pt chronically on h2 blocker thus will cont  - Ileus appear to be resolving + flatus, though no bm yet but patient has not really bean eating    - IVF hydration  - cont prn pain analgesics with bowel regimen  - cont supportive care    # Chronic pancreatitits, chronic pain syndrome s/p recent b/l splanic nerve block on 5/2  - no sign of acute superimposed pancreatitis      # HL - cont med     VTE Pharmacologic Prophylaxis:   Pharmacologic: Heparin  Mechanical VTE Prophylaxis in Place: Yes    Patient Centered Rounds: I have performed bedside rounds with nursing staff today  Discussions with Specialists or Other Care Team Provider:     Education and Discussions with Family / Patient     Time Spent for Care: 30 minutes  More than 50% of total time spent on counseling and coordination of care as described above  Current Length of Stay: 1 day(s)    Current Patient Status: Inpatient   Certification Statement: The patient will continue to require additional inpatient hospital stay due to abd pain    Discharge Plan / Estimated Discharge Date:     Code Status: Level 1 - Full Code      Subjective:   Ambulating around room and states he was able to walk around the hallway yesterday  Abd pain persists    No n/v; + flatus    Objective:     Vitals:   Temp (24hrs), Av 2 °F (36 8 °C), Min:98 1 °F (36 7 °C), Max:98 3 °F (36 8 °C)    HR:  [64-79] 67  Resp:  [16-18] 18  BP: (106-124)/(67-76) 106/76  SpO2:  [94 %-96 %] 94 %  Body mass index is 27 03 kg/m²  Input and Output Summary (last 24 hours): Intake/Output Summary (Last 24 hours) at 05/10/18 1043  Last data filed at 05/10/18 0643   Gross per 24 hour   Intake              531 ml   Output              950 ml   Net             -419 ml       Physical Exam:     Physical Exam   Constitutional: He is oriented to person, place, and time  He appears well-developed and well-nourished  Neck: Neck supple  Cardiovascular: Normal rate, regular rhythm, normal heart sounds and intact distal pulses  Exam reveals no gallop and no friction rub  No murmur heard  Pulmonary/Chest: Effort normal and breath sounds normal  No respiratory distress  He has no wheezes  He has no rales  He exhibits no tenderness  Abdominal: Soft  Bowel sounds are normal  He exhibits no distension and no mass  There is tenderness  There is no rebound and no guarding  In epigastric and LUQ   Musculoskeletal: Normal range of motion  He exhibits no edema, tenderness or deformity  Neurological: He is alert and oriented to person, place, and time  He has normal reflexes  No cranial nerve deficit  He exhibits normal muscle tone  Coordination normal    Skin: Skin is warm and dry         Additional Data:     Labs:      Results from last 7 days  Lab Units 18  0526 18  0957   WBC Thousand/uL 7 84 12 14*   HEMOGLOBIN g/dL 12 6 14 6   HEMATOCRIT % 37 7 43 1   PLATELETS Thousands/uL 140* 171   NEUTROS PCT %  --  74   LYMPHS PCT %  --  12*   MONOS PCT %  --  7   EOS PCT %  --  6       Results from last 7 days  Lab Units 05/10/18  0429   SODIUM mmol/L 139   POTASSIUM mmol/L 3 7   CHLORIDE mmol/L 103   CO2 mmol/L 27   BUN mg/dL 7   CREATININE mg/dL 0 67   CALCIUM mg/dL 8 8   TOTAL PROTEIN g/dL 6 5 BILIRUBIN TOTAL mg/dL 0 50   ALK PHOS U/L 48   ALT U/L 23   AST U/L 15   GLUCOSE RANDOM mg/dL 87           * I Have Reviewed All Lab Data Listed Above  * Additional Pertinent Lab Tests Reviewed:  All Labs Within Last 24 Hours Reviewed    Imaging:    Imaging Reports Reviewed Today Include:   Imaging Personally Reviewed by Myself Includes:      Recent Cultures (last 7 days):           Last 24 Hours Medication List:     Current Facility-Administered Medications:  acetaminophen 650 mg Oral Q6H PRN Cuca Field, DO    albuterol 1 puff Inhalation Q4H PRN Cuca Field, DO    docusate sodium 100 mg Oral BID PRN Cuca Field, DO    famotidine 20 mg Oral BID Cuca Field, DO    fenofibrate 145 mg Oral Daily Cuca Field, DO    fish oil 1,000 mg Oral BID Cuca Field, DO    heparin (porcine) 5,000 Units Subcutaneous Q8H [address] Cuca Field, DO    HYDROmorphone 0 5 mg Intravenous Q4H PRN Cuca Field, DO    HYDROmorphone 1 mg Intravenous Q4H PRN Cuca Field, DO    niacin 500 mg Oral Daily With Breakfast Cuca Field, DO    nicotine 1 patch Transdermal Daily Cuca Field, DO    ondansetron 4 mg Intravenous Q6H PRN Cuca Field, DO    pancrelipase (Lip-Prot-Amyl) 12,000 Units Oral PRN Bo Boyd, PA-TAYLOR    pancrelipase (Lip-Prot-Amyl) 36,000 Units Oral TID AC Dinora Son PA-C    pantoprozole (PROTONIX) infusion (Continuous) 8 mg/hr Intravenous Continuous Bo Boyd PA-C Last Rate: 8 mg/hr (05/10/18 0222)   pravastatin 40 mg Oral Daily With Dinner Cuca Field, DO    sodium chloride 100 mL/hr Intravenous Continuous Cuca Field, DO Last Rate: 100 mL/hr (05/10/18 0220)   traZODone 50 mg Oral HS Cuca Field, DO      Facility-Administered Medications Ordered in Other Encounters:  oxyCODONE-acetaminophen 2 tablet Oral Once Syeda Duvall MD        Today, Patient Was Seen By: Cuca Perez DO    ** Please Note: This note has been constructed using a voice recognition system   **

## 2018-05-10 NOTE — ANESTHESIA PREPROCEDURE EVALUATION
Review of Systems/Medical History  Patient summary reviewed  Chart reviewed      Cardiovascular  Exercise tolerance: good,  Hyperlipidemia, Dysrhythmias ,    Pulmonary  Smoker cigarette smoker  , Pneumonia, Asthma , PRN med  controlled Last rescue: < 6 months ago Asthma type of rescue: PRN inhaler,   Comment: H/O pneumonia 4 years ago; smoker 1/3 pack/day     GI/Hepatic    GERD well controlled, Liver disease , Pancreatic problem,   Comment: H/O of pancreatitis (2 years ago), liver abscess (1 year ago)          Endo/Other     GYN       Hematology   Musculoskeletal       Neurology   Psychology         Lab Results   Component Value Date    WBC 7 84 05/09/2018    HGB 12 6 05/09/2018    HCT 37 7 05/09/2018    MCV 96 05/09/2018     (L) 05/09/2018     Lab Results   Component Value Date    GLUCOSE 87 05/10/2018    CALCIUM 8 8 05/10/2018     05/10/2018    K 3 7 05/10/2018    CO2 27 05/10/2018     05/10/2018    BUN 7 05/10/2018    CREATININE 0 67 05/10/2018     Lab Results   Component Value Date    INR 1 12 11/16/2017    INR 0 85 (L) 08/14/2017    PROTIME 14 6 (H) 11/16/2017    PROTIME 11 8 (L) 08/14/2017     Lab Results   Component Value Date    INR 1 12 11/16/2017    INR 0 85 (L) 08/14/2017    PROTIME 14 6 (H) 11/16/2017    PROTIME 11 8 (L) 08/14/2017     Lab Results   Component Value Date    HGBA1C 5 6 10/10/2016         Physical Exam    Airway    Mallampati score: III  TM Distance: >3 FB  Neck ROM: full     Dental   No notable dental hx     Cardiovascular  Rhythm: regular, Rate: normal,     Pulmonary  Pulmonary exam normal     Other Findings        Anesthesia Plan  ASA Score- 3     Anesthesia Type- IV sedation with anesthesia with ASA Monitors  Additional Monitors:   Airway Plan:     Comment: I, Dr Oksana Howard, the attending physician, has personally seen and evaluated the patient prior to anesthetic care    I have reviewed the pre-anesthetic record, and other medical records if appropriate to the anesthetic care  Risks and benefits discussed with patient; patient consented and agrees to proceed  If a CRNA is involved in the case, I have reviewed the CRNA assessment, if present, and agree  The patient is in a suitable condition to proceed with my formulated anesthetic plan        Plan Factors-Patient not instructed to abstain from smoking on day of procedure  Patient did not smoke on day of surgery  Induction- intravenous  Postoperative Plan-     Informed Consent- Anesthetic plan and risks discussed with patient  I personally reviewed this patient with the CRNA  Discussed and agreed on the Anesthesia Plan with the CRNA  Herbert French

## 2018-05-10 NOTE — CASE MANAGEMENT
Initial Clinical Review     Admission: Date/Time/Statement: OBS 5/8   1124  Converted to IP on 5/9  1029 for treatment of abd pain            Orders Placed This Encounter   Procedures    Place in Observation (expected length of stay for this patient is less than two midnights)       Standing Status:   Standing       Number of Occurrences:   1       Order Specific Question:   Admitting Physician       Answer:   Santa Meneses [[id number]]       Order Specific Question:   Level of Care       Answer:   Med Surg [16]            ED: Date/Time/Mode of Arrival:             ED Arrival Information      Expected Arrival Acuity Means of Arrival Escorted By Service Admission Type     - 5/8/2018 08:51 Urgent Walk-In Spouse General Medicine Urgent     Arrival Complaint     CHEST PAIN             Chief Complaint:        Chief Complaint   Patient presents with    Chest Pain       here this morning with abd pain, now going to chest         History of Illness: Esmer Bain a 37 y  o  male medical history significant for gastritis, chronic pancreatitis, chronic pain disorder; he follows up with pain specialist and actually received on May 2nd bilateral splanchnic nerve block, history of hyperlipidemia he presents to the ER with complaint of abdominal pain   States he that it occurred initially over the weekend after having the lasgna  He had actually presented to the ER a day prior, CT scan of the abdomen was done and he was discharged early this morning   He presents back to the ER due persistent the abdominal pain   His last bowel movement was yesterday he states he was liquid   He reports of 1 episode of vomiting   Pain is located in epigastric region, extending bilaterally and up into his chest, he reported of acid reflux   He denies melena, hematemesis   His last EGD was on April 19th, revealing gastritis       ED Vital Signs:            ED Triage Vitals   Temperature Pulse Respirations Blood Pressure SpO2   05/08/18 0857 05/08/18 0857 05/08/18 0857 05/08/18 1100 05/08/18 0857   98 6 °F (37 °C) 85 18 138/93 96 %       Temp Source Heart Rate Source Patient Position - Orthostatic VS BP Location FiO2 (%)   05/08/18 1200 -- 05/08/18 1200 05/08/18 1200 --   Oral   Lying Right arm         Pain Score           05/08/18 0857           9                Wt Readings from Last 1 Encounters:   05/08/18 87 9 kg (193 lb 12 6 oz)         Vital Signs (abnormal): wnl     Abnormal Labs/Diagnostic Test Results: Na  135, total bili 1 50, wbc  12 14     ED Treatment:              Medication Administration from 05/08/2018 0851 to 05/08/2018 1157        Date/Time Order Dose Route Action Action by Comments       05/08/2018 0958 aluminum-magnesium hydroxide-simethicone (MYLANTA) 200-200-20 mg/5 mL oral suspension 30 mL 30 mL Oral Given Duke Hannah, MAME         05/08/2018 0958 lidocaine viscous (XYLOCAINE) 2 % mucosal solution 15 mL 15 mL Swish & Spit Given Dukekadeem Hannah, RN         05/08/2018 1126 HYDROmorphone (DILAUDID) injection 1 mg 1 mg Intravenous Given Duke Brielle, RN POSS 1             Past Medical/Surgical History:         Active Ambulatory Problems     Diagnosis Date Noted    Pancreatic lesion 02/14/2017    Renal mass 03/02/2017    Smoker 03/02/2017    Leukocytosis 11/16/2017    RBBB 11/16/2017    Epigastric pain 11/15/2017    Acute gastritis without hemorrhage 11/15/2017    Abdominal pain 04/18/2018    Elevated liver enzymes 04/18/2018    Chronic pancreatitis ([facility] [address] ) 04/18/2018    Leukopenia 04/18/2018    Hypertriglyceridemia 04/19/2018    Chronic pain syndrome 05/02/2018           Resolved Ambulatory Problems     Diagnosis Date Noted    Positive D dimer 11/16/2017    Chest pain 11/16/2017    Epigastric abdominal pain 11/16/2017    HLD (hyperlipidemia) 11/16/2017           Past Medical History:   Diagnosis Date    Asthma      Chronic pain disorder      GERD (gastroesophageal reflux disease)      Hyperlipidemia      Liver abscess      Meniscus tear      Pancreatitis           Admitting Diagnosis: Gastritis [K29 70]  Chest pain [R07 9]  Esophagitis [K20 9]  Abdominal pain [R10 9]     Age/Sex: 37 y o  male   Assessment/Plan: Hospital Problem List:    Principal Problem:    Abdominal pain  Active Problems:    Leukocytosis    Ileus (HCC)   Plan for the Primary Problem(s):   # Abdominal pain  - known hx of chronic pancreatitis for which he just recently b/l splanic nerve block on 5/2   Recent EGD in feb also noted with gastritis   S/p ct scan noting small bowel ileus    - Consult GI  - NPO  - IV PPI BID, and pt chronically on h2 blocker thus will cont  - Have educated patient given ileus on ct scan aim would be to minimize narcotics thus only place on very minimal dose until input per GI  Patient specifically asking for dilaudid  - IVF hydration  - cont supportive care  - cont creon   # Leukocytosis - likely reactive, already trending down   No focal source of infection   Cont to hold abx   # Hyponatremia, mild - IVF hydration, no change in mental status   Plan for Additional Problems:   # HL - cont meds   Disp: Plan of care extensively discussed with patient and his wife  VTE Prophylaxis: Heparin  / sequential compression device   Code Status: Full code  POLST: There is no POLST form on file for this patient (pre-hospital)   Anticipated Length of Stay: Thao Roque will be admitted on an Observation basis with an anticipated length of stay of  < 2 midnights    Justification for Hospital Stay: Abdominal pain     Admission Orders:  Scheduled Meds:   Current Facility-Administered Medications:  acetaminophen 650 mg Oral Q6H PRN [place] Acron, DO     albuterol 1 puff Inhalation Q4H PRN [place] Acron, DO     docusate sodium 100 mg Oral BID PRN [place] Acron, DO     famotidine 20 mg Oral BID [place] Acron, DO     fenofibrate 145 mg Oral Daily [place] Acron, DO     fish oil 1,000 mg Oral BID [place] Acron, DO     heparin (porcine) 5,000 Units Subcutaneous [facility] Brandyn Armstrong DO     HYDROmorphone 0 2 mg Intravenous Q4H PRN Brandyn Armstrong DO     niacin 500 mg Oral Daily With Breakfast Brandyn Armstrong DO     nicotine 1 patch Transdermal Daily Brandyn Armstrong DO     ondansetron 4 mg Intravenous Q6H PRN Brandyn Armstrong DO     pancrelipase (Lip-Prot-Amyl) 12,000 Units Oral TID AC Brandyn Armstrong,      pancrelipase (Lip-Prot-Amyl) 36,000 Units Oral TID AC Brandyn Armstrong DO     pantoprazole 40 mg Intravenous Q12H [address] Brandyn Armstrong DO     pravastatin 40 mg Oral Daily With Dinner Brandyn Armstrong DO     sodium chloride 100 mL/hr Intravenous Continuous Brandyn Armstrong DO Last Rate: 100 mL/hr (05/09/18 0103)   traZODone 50 mg Oral HS Brandyn Armstrong DO        Facility-Administered Medications Ordered in Other Encounters:  oxyCODONE-acetaminophen 2 tablet Oral Once Ponce Kaiser MD      Continuous Infusions:   sodium chloride 100 mL/hr Last Rate: 100 mL/hr (05/09/18 0103)      PRN Meds:   acetaminophen    albuterol    docusate sodium    HYDROmorphone  q4h prn  x6    ondansetron     NPO   SCD  Up and OOB as colby   GI consult  5/9 cbc , bmp  plt  140    GI consult  5/9  1  Epigastric pain/nausea and vomiting with CT scan finding suggestive of thickening in the distal esophagus/GE junction-suggestive of either severe esophagitis versus a Felisa-Wheatley tear  Esophagus appeared normal on EGD recently   -will continue Protonix 40 mg IV b i d  in addition to Carafate q i d  Karen Mort -clear liquid diet today followed by NPO past midnight for endoscopic evaluation tomorrow   -ileus appears to be resolved as patient is tolerating clear liquid diet and passing gas    2   Abnormal liver functions with mild elevation of total bilirubin which has now resolved, there is no evidence of choledocholithiasis or biliary obstruction on MRI  Pancreas appears normal on the MRI    Meanwhile continue pancreatic enzymes for chronic pancreatitis    3  Thickened gastric wall and lymphadenopathy:  EGD recently did not show severe gastritis or esophagitis which was noted on the CT scan, he is planned for outpatient endoscopic ultrasound for evaluation of gastric thickening and lymphadenopathy  Assessment:   Principal Problem:    Abdominal pain  Active Problems:    Leukocytosis    Ileus (HCC)    Esophagitis   Plan:   # Abdominal pain  - known hx of chronic pancreatitis for which he just recently b/l splanic nerve block on 5/2   Recent EGD in feb also noted with gastritis   S/p ct scan noting small bowel ileus    - GI on board  - s/p MRI revealing extensive thickening in GE junction and suggesting eval for nubia galindo tear, thus plan for ED today  - Diet advanced to clears  - on protonix gtt, and pt chronically on h2 blocker thus will cont  - Ileus appear to be resolving + flatus, though no bm yet but patient has not really bean eating    - IVF hydration  - cont prn pain analgesics with bowel regimen  - cont supportive care   # Chronic pancreatitits, chronic pain syndrome s/p recent b/l splanic nerve block on 5/2  - no sign of acute superimposed pancreatitis    # HL - cont med   VTE Pharmacologic Prophylaxis:   Pharmacologic: Heparin  Mechanical VTE Prophylaxis in Place: Yes   Patient Centered Rounds: I have performed bedside rounds with nursing staff today    Discussions with Specialists or Other Care Team Provider:    Education and Discussions with Family / Patient    Time Spent for Care: 30 minutes    More than 50% of total time spent on counseling and coordination of care as described above    Current Length of Stay: 1 day(s)   Current Patient Status: Inpatient   Certification Statement: The patient will continue to require additional inpatient hospital stay due to abd pain  [address] / Estimated Discharge Date:  TBD

## 2018-05-10 NOTE — OP NOTE
**** GI/ENDOSCOPY REPORT ****     PATIENT NAME: Kelsey Benson - VISIT ID:  Patient ID: [id number]   YOB: 1975     INTRODUCTION: Esophagogastroduodenoscopy - A 37 male patient presents for   an inpatient Esophagogastroduodenoscopy at [address] [place] [address]  INDICATIONS: Abdominal pain  CONSENT: The benefits, risks, and alternatives to the procedure were   discussed and informed consent was obtained from the patient  PREPARATION:  EKG, pulse, pulse oximetry and blood pressure were monitored   throughout the procedure  ASA Classification: Class 2 - Patient has mild   to moderate systemic disturbance that may or may not be related to the   disorder requiring surgery  MEDICATIONS: Anesthesia administered by anesthesiologist      PROCEDURE:  The endoscope was passed without difficulty through the mouth   under direct visualization and advanced to the 2nd portion of the   duodenum  The scope was withdrawn and the mucosa was carefully examined  FINDINGS:   Esophagus: Mild reflux-induced esophagitis was found in the GE   junction  Stomach: On retroflexed view, the antrum, body of the stomach,   cardia, fundus, and incisura and greater curvature of the stomach body   appeared to be normal   Duodenum: The duodenal bulb, 1st portion of the   duodenum, and 2nd portion of the duodenum appeared to be normal      COMPLICATIONS: There were no complications  IMPRESSIONS: Mild esophagitis seen in the GE junction  Normal antrum, body   of the stomach, cardia, fundus, and incisura and greater curvature of the   stomach body  Normal duodenal bulb, 1st portion of the duodenum, and 2nd   portion of the duodenum  RECOMMENDATIONS: Return to floor  Avoid all non-steroidal   anti-inflammatory drugs (NSAID's) including but not limited to Ibuprofen,   Advil, Motrin, and Nuprin  Anti-reflux measures: Raise the head of the bed   4 to 6 inches  Avoid smoking   Avoid excess coffee, tea or other caffeinated beverages  Avoid garments that fit tightly through the   abdomen  Avoid eating before bed  Start anti-reflux diet  Continue taking   the following medications as prescribed: COntinue PPI BID and carafate 1 g   QID  Would recommend outpatient EUS for gastric wall thickening that has   been noted on the MRI/CT in the past      ESTIMATED BLOOD LOSS: None  PATHOLOGY SPECIMENS: No     PROCEDURE CODES:     ICD-9 Codes: 789 00 Abdominal pain, unspecified site 530 11 Reflux   esophagitis     ICD-10 Codes: R10 Abdominal and pelvic pain K20 9 Esophagitis, unspecified     PERFORMED BY: DAINA Smiley  on 05/10/2018  Version 1, electronically signed by DAINA Pretty  on 05/10/2018   at 13:16

## 2018-05-10 NOTE — ANESTHESIA POSTPROCEDURE EVALUATION
Post-Op Assessment Note      CV Status:  Stable    Mental Status:  Alert and awake    Hydration Status:  Euvolemic    PONV Controlled:  Controlled    Airway Patency:  Patent    Post Op Vitals Reviewed: Yes          Staff: CRNA       Comments: vss sv nonobstructed uneventful          BP   148/68   Temp      Pulse 74   Resp 22   SpO2 98

## 2018-05-11 ENCOUNTER — TELEPHONE (OUTPATIENT)
Dept: PAIN MEDICINE | Facility: MEDICAL CENTER | Age: 43
End: 2018-05-11

## 2018-05-11 ENCOUNTER — TELEPHONE (OUTPATIENT)
Dept: RADIOLOGY | Facility: CLINIC | Age: 43
End: 2018-05-11

## 2018-05-11 VITALS
HEART RATE: 59 BPM | DIASTOLIC BLOOD PRESSURE: 73 MMHG | BODY MASS INDEX: 27.02 KG/M2 | OXYGEN SATURATION: 97 % | TEMPERATURE: 97.7 F | HEIGHT: 71 IN | RESPIRATION RATE: 16 BRPM | SYSTOLIC BLOOD PRESSURE: 134 MMHG | WEIGHT: 193 LBS

## 2018-05-11 PROBLEM — K56.7 ILEUS (HCC): Status: RESOLVED | Noted: 2018-05-08 | Resolved: 2018-05-11

## 2018-05-11 PROBLEM — K86.1 OTHER CHRONIC PANCREATITIS (HCC): Status: ACTIVE | Noted: 2018-05-11

## 2018-05-11 PROBLEM — D72.829 LEUKOCYTOSIS: Status: RESOLVED | Noted: 2017-11-16 | Resolved: 2018-05-11

## 2018-05-11 PROCEDURE — 99239 HOSP IP/OBS DSCHRG MGMT >30: CPT | Performed by: INTERNAL MEDICINE

## 2018-05-11 PROCEDURE — 99232 SBSQ HOSP IP/OBS MODERATE 35: CPT | Performed by: PHYSICIAN ASSISTANT

## 2018-05-11 RX ORDER — ACETAMINOPHEN 325 MG/1
650 TABLET ORAL EVERY 6 HOURS PRN
Qty: 30 TABLET | Refills: 0 | Status: ON HOLD
Start: 2018-05-11 | End: 2018-10-29 | Stop reason: ALTCHOICE

## 2018-05-11 RX ORDER — OXYCODONE HYDROCHLORIDE 5 MG/1
5 TABLET ORAL EVERY 6 HOURS PRN
Qty: 20 TABLET | Refills: 0 | Status: SHIPPED | OUTPATIENT
Start: 2018-05-11 | End: 2018-05-21 | Stop reason: ALTCHOICE

## 2018-05-11 RX ORDER — SUCRALFATE 1 G/1
1 TABLET ORAL 4 TIMES DAILY
Qty: 28 TABLET | Refills: 0 | Status: SHIPPED | OUTPATIENT
Start: 2018-05-11 | End: 2018-08-09 | Stop reason: CLARIF

## 2018-05-11 RX ORDER — DOCUSATE SODIUM 100 MG/1
100 CAPSULE, LIQUID FILLED ORAL 2 TIMES DAILY PRN
Qty: 10 CAPSULE | Refills: 0 | Status: ON HOLD
Start: 2018-05-11 | End: 2018-09-11

## 2018-05-11 RX ADMIN — SUCRALFATE 1000 MG: 1 SUSPENSION ORAL at 06:10

## 2018-05-11 RX ADMIN — HYDROMORPHONE HYDROCHLORIDE 0.5 MG: 1 INJECTION, SOLUTION INTRAMUSCULAR; INTRAVENOUS; SUBCUTANEOUS at 08:48

## 2018-05-11 RX ADMIN — Medication 1000 MG: at 08:50

## 2018-05-11 RX ADMIN — SUCRALFATE 1000 MG: 1 SUSPENSION ORAL at 10:55

## 2018-05-11 RX ADMIN — FAMOTIDINE 20 MG: 20 TABLET ORAL at 08:50

## 2018-05-11 RX ADMIN — SODIUM CHLORIDE 100 ML/HR: 0.9 INJECTION, SOLUTION INTRAVENOUS at 04:43

## 2018-05-11 RX ADMIN — NICOTINE 1 PATCH: 14 PATCH, EXTENDED RELEASE TRANSDERMAL at 08:50

## 2018-05-11 RX ADMIN — PANTOPRAZOLE SODIUM 40 MG: 40 TABLET, DELAYED RELEASE ORAL at 06:10

## 2018-05-11 RX ADMIN — HYDROMORPHONE HYDROCHLORIDE 1 MG: 1 INJECTION, SOLUTION INTRAMUSCULAR; INTRAVENOUS; SUBCUTANEOUS at 04:42

## 2018-05-11 RX ADMIN — PANCRELIPASE 36000 UNITS: 24000; 76000; 120000 CAPSULE, DELAYED RELEASE PELLETS ORAL at 06:12

## 2018-05-11 RX ADMIN — NIACIN 500 MG: 500 TABLET, FILM COATED, EXTENDED RELEASE ORAL at 08:50

## 2018-05-11 RX ADMIN — SODIUM CHLORIDE 100 ML/HR: 0.9 INJECTION, SOLUTION INTRAVENOUS at 10:53

## 2018-05-11 RX ADMIN — PANCRELIPASE 36000 UNITS: 24000; 76000; 120000 CAPSULE, DELAYED RELEASE PELLETS ORAL at 10:54

## 2018-05-11 RX ADMIN — FENOFIBRATE 145 MG: 145 TABLET, FILM COATED ORAL at 08:50

## 2018-05-11 RX ADMIN — HEPARIN SODIUM 5000 UNITS: 5000 INJECTION, SOLUTION INTRAVENOUS; SUBCUTANEOUS at 06:10

## 2018-05-11 RX ADMIN — HYDROMORPHONE HYDROCHLORIDE 1 MG: 1 INJECTION, SOLUTION INTRAMUSCULAR; INTRAVENOUS; SUBCUTANEOUS at 10:53

## 2018-05-11 NOTE — PLAN OF CARE
Problem: Potential for Falls  Goal: Patient will remain free of falls  INTERVENTIONS:  - Assess patient frequently for physical needs  -  Identify cognitive and physical deficits and behaviors that affect risk of falls    -  [place] fall precautions as indicated by assessment   - Educate patient/family on patient safety including physical limitations  - Instruct patient to call for assistance with activity based on assessment  - Modify environment to reduce risk of injury  - Consider OT/PT consult to assist with strengthening/mobility   Outcome: Progressing      Problem: PAIN - ADULT  Goal: Verbalizes/displays adequate comfort level or baseline comfort level  Interventions:  - Encourage patient to monitor pain and request assistance  - Assess pain using appropriate pain scale  - Administer analgesics based on type and severity of pain and evaluate response  - Implement non-pharmacological measures as appropriate and evaluate response  - Consider cultural and social influences on pain and pain management  - Notify physician/advanced practitioner if interventions unsuccessful or patient reports new pain   Outcome: Progressing      Problem: INFECTION - ADULT  Goal: Absence or prevention of progression during hospitalization  INTERVENTIONS:  - Assess and monitor for signs and symptoms of infection  - Monitor lab/diagnostic results  - Monitor all insertion sites, i e  indwelling lines, tubes, and drains  - Monitor endotracheal (as able) and nasal secretions for changes in amount and color  - [place] appropriate cooling/warming therapies per order  - Administer medications as ordered  - Instruct and encourage patient and family to use good hand hygiene technique  - Identify and instruct in appropriate isolation precautions for identified infection/condition   Outcome: Progressing    Goal: Absence of fever/infection during neutropenic period  INTERVENTIONS:  - Monitor WBC  - Implement neutropenic guidelines   Outcome: Progressing      Problem: SAFETY ADULT  Goal: Maintain or return to baseline ADL function  INTERVENTIONS:  -  Assess patient's ability to carry out ADLs; assess patient's baseline for ADL function and identify physical deficits which impact ability to perform ADLs (bathing, care of mouth/teeth, toileting, grooming, dressing, etc )  - Assess/evaluate cause of self-care deficits   - Assess range of motion  - Assess patient's mobility; develop plan if impaired  - Assess patient's need for assistive devices and provide as appropriate  - Encourage maximum independence but intervene and supervise when necessary  ¯ Involve family in performance of ADLs  ¯ Assess for home care needs following discharge   ¯ Request OT consult to assist with ADL evaluation and planning for discharge  ¯ Provide patient education as appropriate   Outcome: Progressing    Goal: Maintain or return mobility status to optimal level  INTERVENTIONS:  - Assess patient's baseline mobility status (ambulation, transfers, stairs, etc )    - Identify cognitive and physical deficits and behaviors that affect mobility  - Identify mobility aids required to assist with transfers and/or ambulation (gait belt, sit-to-stand, lift, walker, cane, etc )  - [place] fall precautions as indicated by assessment  - Record patient progress and toleration of activity level on Mobility SBAR; progress patient to next Phase/Stage  - Instruct patient to call for assistance with activity based on assessment  - Request Rehabilitation consult to assist with strengthening/weightbearing, etc    Outcome: Progressing      Problem: DISCHARGE PLANNING  Goal: Discharge to home or other facility with appropriate resources  INTERVENTIONS:  - Identify barriers to discharge w/patient and caregiver  - Arrange for needed discharge resources and transportation as appropriate  - Identify discharge learning needs (meds, wound care, etc )  - Arrange for interpretive services to assist at discharge as needed  - Refer to Case Management Department for coordinating discharge planning if the patient needs post-hospital services based on physician/advanced practitioner order or complex needs related to functional status, cognitive ability, or social support system   Outcome: Progressing      Problem: Knowledge Deficit  Goal: Patient/family/caregiver demonstrates understanding of disease process, treatment plan, medications, and discharge instructions  Complete learning assessment and assess knowledge base    Interventions:  - Provide teaching at level of understanding  - Provide teaching via preferred learning methods   Outcome: Progressing      Problem: DISCHARGE PLANNING - CARE MANAGEMENT  Goal: Discharge to post-acute care or home with appropriate resources  INTERVENTIONS:  - Conduct assessment to determine patient/family and health care team treatment goals, and need for post-acute services based on payer coverage, community resources, and patient preferences, and barriers to discharge  - Address psychosocial, clinical, and financial barriers to discharge as identified in assessment in conjunction with the patient/family and health care team  - Arrange appropriate level of post-acute services according to patients   needs and preference and payer coverage in collaboration with the physician and health care team  - Communicate with and update the patient/family, physician, and health care team regarding progress on the discharge plan  - Arrange appropriate transportation to post-acute venues   Outcome: Progressing

## 2018-05-11 NOTE — DISCHARGE INSTRUCTIONS
Abstain from all alcohol use    No driving or operation heavy machinery while on narcotics    Pancreatitis   WHAT YOU NEED TO KNOW:   What is pancreatitis? Pancreatitis is inflammation of your pancreas  The pancreas is an organ that makes insulin  It also makes enzymes (digestive juices) that help your body digest food  Pancreatitis may be an acute (short-term) problem that happens only once  It may become a chronic (long-term) problem that comes and goes over time  What causes pancreatitis? Pancreatitis is usually caused by alcohol or gallstones  Less common causes are certain medicines, an injury to the abdomen, some procedures, and infections  High levels of triglycerides (fats) and calcium may also cause pancreatitis  What are the signs and symptoms of pancreatitis? · Severe burning, stabbing, or aching pain that starts in the top of your abdomen and spreads to your back    · Fever    · Nausea and vomiting    · Abdomen that is tender to the touch    · Weight loss without trying  How is pancreatitis diagnosed? Your healthcare provider will examine you and ask about your symptoms  You may need any of the following:  · Blood tests  may show infection, pancreas function, or provide information about your overall health  · An x-ray, ultrasound, or CT  may show the cause of your pancreatitis and help healthcare providers plan your treatment  You may be given contrast liquid to help your pancreas show up better in the pictures  Tell the healthcare provider if you have ever had an allergic reaction to contrast liquid  · Endoscopic retrograde cholangiopancreatography (ERCP)  is a procedure used to find stones, tumors, or narrowed bile ducts  A long tube with a camera on the end is passed down your throat and into your stomach and abdomen  How is pancreatitis treated? Treatment depends on the cause of your pancreatitis  You may need to stay in the hospital for treatment and more tests    · Medicines: ¨ Antibiotics  treat a bacterial infection  ¨ Prescription pain medicine  may be given  Ask your healthcare provider how to take this medicine safely  Some prescription pain medicines contain acetaminophen  Do not take other medicines that contain acetaminophen without talking to your healthcare provider  Too much acetaminophen may cause liver damage  Prescription pain medicine may cause constipation  Ask your healthcare provider how to prevent or treat constipation  · Surgery  may be needed to open or widen blocked bile ducts or drain fluid from your pancreas  Your gallbladder may need to be removed if gallstones are causing your pancreatitis  How can I manage my symptoms? · Rest  when you feel it is needed  Slowly start to do more each day  Return to your usual activities as directed  · Do not drink any alcohol  If you need help to stop drinking, contact the following organization:   ¨ Alcoholics Anonymous  Web Address: http://www [employer]/      · Ask your healthcare provider or dietitian about the best foods to eat  You may need to eat foods that are low in fat if you have chronic pancreatitis  When should I seek immediate care? · You have severe pain in your abdomen and you are vomiting  When should I contact my healthcare provider? · You have a fever  · You continue to lose weight without trying  · Your skin or the whites of your eyes turn yellow  · You have questions or concerns about your condition or care  CARE AGREEMENT:   You have the right to help plan your care  Learn about your health condition and how it may be treated  Discuss treatment options with your caregivers to decide what care you want to receive  You always have the right to refuse treatment  The above information is an  only  It is not intended as medical advice for individual conditions or treatments   Talk to your doctor, nurse or pharmacist before following any medical regimen to see if it is safe and effective for you  © 2017 [employer] [employer] Information is for End User's use only and may not be sold, redistributed or otherwise used for commercial purposes  All illustrations and images included in CareNotes® are the copyrighted property of A D A M [employer] Inc  or [employer]

## 2018-05-11 NOTE — TELEPHONE ENCOUNTER
Pt wife called stating pt has been hospitalized and needs to be seen immediately by Dr Frank Craft  Pt was sent home with some pain medication from hospital  An appt was made for Monday 5/14 with Dr Frank Craft, wife was very appreciative

## 2018-05-11 NOTE — TELEPHONE ENCOUNTER
BOZENA    Received a call from [facility]  PA caring for patient states that pt has had frequent hospitalizations r/t abdominal pain  She states pain r/t chronic pancreatitis and can [facility] expedite scheduling patient for planned procedure with SI  Advised  aware of request and will reach out to OR for available time  PA states patient is anticipated to be discharged today

## 2018-05-11 NOTE — PROGRESS NOTES
GI Progress Note - Christianne Mccormack 37 y o  male MRN: [id number]    Unit/Bed#: -01 Encounter: [id number]    Subjective: He continues to have LUQ/epigastric pain radiation down bilateral flanks, consistent with previous pain related to chronic pancreatitis  He has no alleviating factors and is waiting to hear back from pain management to schedule repeated splanchnic nerve block  He does not want to use chronic narcotics for pain control because he wants to continue working  Objective:     Vitals: Blood pressure 134/73, pulse 59, temperature 97 7 °F (36 5 °C), temperature source Oral, resp  rate 16, height 5' 11" (1 803 m), weight 87 5 kg (193 lb), SpO2 97 %  ,Body mass index is 26 92 kg/m²        Intake/Output Summary (Last 24 hours) at 05/11/18 1142  Last data filed at 05/10/18 1550   Gross per 24 hour   Intake             1250 ml   Output                0 ml   Net             1250 ml       Physical Exam:     General Appearance: Alert, oriented x3, appears uncomfortable  Lungs: CTA bilaterally, no respiratory distress  Heart: RRR, no murmur  Abdomen: Non-distended, soft, BS active, (+) generalized TTP  Extremities: No cyanosis or LE edema    Invasive Devices     Peripheral Intravenous Line            Peripheral IV 05/08/18 Right Antecubital 3 days                Lab Results:    Results from last 7 days  Lab Units 05/09/18  0526 05/08/18  0957   WBC Thousand/uL 7 84 12 14*   HEMOGLOBIN g/dL 12 6 14 6   HEMATOCRIT % 37 7 43 1   PLATELETS Thousands/uL 140* 171   NEUTROS PCT %  --  74   LYMPHS PCT %  --  12*   MONOS PCT %  --  7   EOS PCT %  --  6       Results from last 7 days  Lab Units 05/10/18  0429   SODIUM mmol/L 139   POTASSIUM mmol/L 3 7   CHLORIDE mmol/L 103   CO2 mmol/L 27   BUN mg/dL 7   CREATININE mg/dL 0 67   CALCIUM mg/dL 8 8   TOTAL PROTEIN g/dL 6 5   BILIRUBIN TOTAL mg/dL 0 50   ALK PHOS U/L 48   ALT U/L 23   AST U/L 15   GLUCOSE RANDOM mg/dL 87           Results from last 7 days  Lab Units 05/08/18  0957   LIPASE u/L 172       Imaging Studies: I have personally reviewed pertinent imaging studies  Mri Abdomen W Wo Contrast And Mrcp  Result Date: 5/9/2018  Impression: No evidence of choledocholithiasis or biliary dilation Extensive thickening noted near the GE junction with surrounding inflammatory changes, this may be due to however in the setting of flow vomiting and acute abdominal pain evaluation for Felisa-[place] tear suggested  Endoscopy can be helpful for further evaluation Small bilateral effusion          Assessment and Plan:   Principal Problem:    Abdominal pain  Active Problems:    Leukocytosis    Ileus (HCC)    Esophagitis    Acute on Chronic Pancreatitis  Small Bowel Ileus  - CT A/P on admission shows hepatomegaly, s/p cholecystectomy, hypoplastic tail of pancreas, gastritis and GE thickening, slightly prominent tissue stranding/thickening from the inferior gastric cardia toward superior distal pancreatic body, small bowel ileus noted which is felt to be reactive   - Subsequent MRI/MRCP showed normal appearing liver, patent portal and hepatic veins, normal biliary tree, normal pancreas, but with extensive thickening near the GE junction concerninig for MWT with history of vomiting  - S/P EGD yesterday showing mild esophagitis at the GE junction, otherwise normal EGD  - Suspect his symptoms are 2/2 acute on chronic pancreatitis and he is supposed to be getting another bilateral splanchnic nerve block with his pain management doctor in the near future  - Low fat diet; ileus questionable and has resolved  - Supportive care with antiemetics and pain medications  - We discussed the importance of quitting smoking as this is a risk factor of pancreatitis and may be contributing to his acute on chronic pain episodes; we also discussed alcohol use which he does rarely but this can also cause acute on chronic pain  - Called pain management office and discussed with RN to help expedite scheduling of nerve block      Esophagitis  Gastric Wall Thickening  - Continue protonix 40 mg BID; would use carafate 1 g QID for 1-2 weeks  - Pt is in the process of scheduling outpatient EUS to evaluate the gastric wall thickening noted on repeat CTs       Chronic Pancreatitis  - Previously 2/2 hypertriglyceridemia  -  on admission; continue treatment of hypertriglyceridemia including niacin, fenofibrate, and statin  - Creon supplements        The patient's care will be discussed with Dr Meraz Fruit  The patient is stable for discharge from GI standpoint

## 2018-05-11 NOTE — DISCHARGE SUMMARY
Discharge Summary - [address] [facility]    Patient Information: Anne Washington 37 y o  male MRN: [id number]  Unit/Bed#: -01 Encounter: [id number]    Discharging Physician / Practitioner: Erendira Ha DO  PCP: Damián Alarcon MD  Admission Date: 5/8/2018  Discharge Date: 05/11/18    Reason for Admission:  Abdominal pain    Discharge Diagnoses:     Principal Problem:    Chronic pancreatitis ([facility] )  Active Problems:    Abdominal pain    Esophagitis  Resolved Problems:    Leukocytosis    Ileus ([facility] )    HPI: Anne Washington is a 37 y o  male medical history significant for gastritis, chronic pancreatitis, chronic pain disorder; he follows up with pain specialist and actually received on May 2nd bilateral splanchnic nerve block, history of hyperlipidemia he presents to the ER with complaint of abdominal pain  States he that it occurred initially over the weekend after having the lasgna  He had actually presented to the ER a day prior, CT scan of the abdomen was done and he was discharged early this morning  He presents back to the ER due persistent the abdominal pain  His last bowel movement was yesterday he states he was liquid  He reports of 1 episode of vomiting  Pain is located in epigastric region, extending bilaterally and up into his chest, he reported of acid reflux  He denies melena, hematemesis  His last EGD was on April 19th, revealing gastritis  Consultations During Hospital Stay:  · Gastroenterology    Procedures Performed:     · EGD  · MRCP  · CT scan abdomen pelvis    Significant Findings:     · Refer to hospital course    Incidental Findings:   ·     Test Results Pending at Discharge (will require follow up):   ·      Outpatient Tests Requested:    Complications:  None    Hospital Course:       # Chronic abdominal pain  -  Turns out secondary to chronic pancreatitis  - GI was on board  - Noted on ct scan of ?  Small bowel ileus though pt did not clinically present as such  - MRI was also done, given results, GI proceeded EGD, pt w/ hx of gastritis from prior EGD  EGD revealing mild esophagitis  - Pt to cont on PPI BID and carafate x 1 week  - In regards to hx of chronic pancreatitis, he  follows up with pain management and actually had received prior bilateral splanchnic nerve block  His last appointment was on 05/02 unfortunately the procedure could not be done therefore had to be rescheduled  GI is in the process of obtaining a quicker appointment  Okay per pain specialist to give short course of p r n  oxycodone as patient does not have a pain contract  Patient has been extensively educated that he is not to drive or operate heavy machinery while on narcotics  - diet advanced with tolerant  -patient educated at length to abstain from all alcohol use, he had admitted to GI that during the week he had drank couple of beers   -continue Creon      # HL - cont meds    Disp: D/c to home  Plan of care extensively discussed with patient at length  Condition at Discharge: stable     Discharge Day Visit / Exam:     Subjective:  No sign of pain distress, chronic abd pain as usual; tolerated advancement in diet  Vitals: Blood Pressure: 134/73 (05/11/18 0700)  Pulse: 59 (05/11/18 0700)  Temperature: 97 7 °F (36 5 °C) (05/11/18 0700)  Temp Source: Oral (05/11/18 0700)  Respirations: 16 (05/11/18 0700)  Height: 5' 11" (180 3 cm) (05/10/18 1132)  Weight - Scale: 87 5 kg (193 lb) (05/10/18 1132)  SpO2: 97 % (05/11/18 0700)  Exam:   Physical Exam   Constitutional: He is oriented to person, place, and time  He appears well-developed and well-nourished  Neck: Neck supple  Cardiovascular: Normal rate, regular rhythm, normal heart sounds and intact distal pulses  Exam reveals no gallop and no friction rub  No murmur heard  Pulmonary/Chest: Effort normal and breath sounds normal  No respiratory distress  He has no wheezes  He has no rales  He exhibits no tenderness  Abdominal: Soft   Bowel sounds are normal  He exhibits no distension and no mass  There is tenderness  There is no rebound and no guarding  In epigastric to deep palpation   Musculoskeletal: Normal range of motion  He exhibits no edema, tenderness or deformity  Neurological: He is alert and oriented to person, place, and time  He has normal reflexes  He displays normal reflexes  No cranial nerve deficit  He exhibits normal muscle tone  Coordination normal    Skin: Skin is warm and dry  Discharge instructions/Information to patient and family:   See after visit summary for information provided to patient and family  Provisions for Follow-Up Care:  See after visit summary for information related to follow-up care and any pertinent home health orders  Disposition:     Home    For Discharges to   [address] SNF:   · Not Applicable to this Patient - Not Applicable to this Patient    Planned Readmission: No     Discharge Statement:  I spent > 30 minutes discharging the patient  This time was spent on the day of discharge  I had direct contact with the patient on the day of discharge  Greater than 50% of the total time was spent examining patient, answering all patient questions, arranging and discussing plan of care with patient as well as directly providing post-discharge instructions  Additional time then spent on discharge activities  Discharge Medications:  See after visit summary for reconciled discharge medications provided to patient and family  ** Please Note: Dragon 360 Dictation voice to text software may have been used in the creation of this document   **

## 2018-05-14 ENCOUNTER — OFFICE VISIT (OUTPATIENT)
Dept: PAIN MEDICINE | Facility: CLINIC | Age: 43
End: 2018-05-14
Payer: COMMERCIAL

## 2018-05-14 ENCOUNTER — TRANSITIONAL CARE MANAGEMENT (OUTPATIENT)
Dept: FAMILY MEDICINE CLINIC | Facility: CLINIC | Age: 43
End: 2018-05-14

## 2018-05-14 VITALS
SYSTOLIC BLOOD PRESSURE: 126 MMHG | HEART RATE: 74 BPM | RESPIRATION RATE: 18 BRPM | WEIGHT: 193 LBS | HEIGHT: 71 IN | BODY MASS INDEX: 27.02 KG/M2 | DIASTOLIC BLOOD PRESSURE: 74 MMHG

## 2018-05-14 DIAGNOSIS — R10.13 EPIGASTRIC PAIN: ICD-10-CM

## 2018-05-14 DIAGNOSIS — K86.1 CHRONIC PANCREATITIS, UNSPECIFIED PANCREATITIS TYPE (HCC): ICD-10-CM

## 2018-05-14 DIAGNOSIS — G89.4 CHRONIC PAIN SYNDROME: Primary | ICD-10-CM

## 2018-05-14 PROCEDURE — 99213 OFFICE O/P EST LOW 20 MIN: CPT | Performed by: ANESTHESIOLOGY

## 2018-05-14 PROCEDURE — 1111F DSCHRG MED/CURRENT MED MERGE: CPT | Performed by: ANESTHESIOLOGY

## 2018-05-14 RX ORDER — CYCLOBENZAPRINE HCL 10 MG
10 TABLET ORAL 3 TIMES DAILY PRN
Qty: 60 TABLET | Refills: 0 | Status: SHIPPED | OUTPATIENT
Start: 2018-05-14 | End: 2018-06-13 | Stop reason: SDUPTHER

## 2018-05-14 NOTE — PROGRESS NOTES
Assessment:  1  Chronic pain syndrome  cyclobenzaprine (FLEXERIL) 10 mg tablet   2  Epigastric pain     3  Chronic pancreatitis, unspecified pancreatitis type ([facility] [address] )         Plan: This is a 78-year-old male who presents today for follow-up office visit for management of chronic abdominal pain secondary to chronic pancreatitis  In the past, we have performed a bilateral splanchnic nerve block which provided greater than 90% pain relief  He was seen about 2 weeks ago in the office and we scheduled a repeat injection  Patient was seen in the ER because he had worsening abdominal pain  He was on lorzone with good relief of pain but ? Elevation in his LFTs and lorzone was discontinued  At this time, I will begin flexeril 10mg po TID  I informed him that he should give me a call and left me know if it is helping  He should follow up with Dr Bunny Mercer - repeat LFTs  Also we may consider restarting lorzone  Patient states that he does not think that his elevated LFTs was from [place]  He states that he was taking a lot of aleve in addition to lorzone at the time he had trouble in his knee  He attributes increase in his LFTs to the aleve as he has been on lorzone in the past without any issues  He will discuss this with Dr Griffith  Patient has 4 tablets of oxycodone left over  I advised him to abstain from using oxycodone for abdominal pain  He will take 1 tablet daily if needed and taper off completely  My impressions and treatment recommendations were discussed in detail with the patient who verbalized understanding and had no further questions  Discharge instructions were provided  I personally saw and examined the patient and I agree with the above discussed plan of care  History of Present Illness:  Vineet Ceja is a 37 y o  male who presents for a follow up office visit in regards to Abdominal Pain  The patients current symptoms include diffuse abdominal pain   Patient states that he was in the ER last week  For  5 days  He states that that he had severe pain and was given some oxycodone upon discharge  He states that the nerve block wore off  He is scheduled for a bilateral splanchnic nerve block in 2 weeks - 5/29/18  He states that he has some nausea  Pain is 8/10  He was on lorzone and gralise in the past and it elevated his LFTs and he was subsequently taken off  He has also tried tramadol, and gabapentin w/o much relief  I have personally reviewed and/or updated the patient's past medical history, past surgical history, family history, social history, current medications, allergies, and vital signs today  Review of Systems   Respiratory: Negative for shortness of breath  Cardiovascular: Negative for chest pain  Gastrointestinal: Positive for abdominal pain and nausea  Negative for constipation, diarrhea and vomiting  Musculoskeletal: Negative for arthralgias, gait problem, joint swelling and myalgias  Skin: Negative for rash  Neurological: Negative for dizziness, seizures and weakness  All other systems reviewed and are negative  Patient Active Problem List   Diagnosis    Pancreatic lesion    Renal mass    Smoker    RBBB    Epigastric pain    Acute gastritis without hemorrhage    Abdominal pain    Elevated liver enzymes    Chronic pancreatitis (HCC)    Leukopenia    Hypertriglyceridemia    Chronic pain syndrome    Esophagitis    Other chronic pancreatitis (HCC)       Past Medical History:   Diagnosis Date    Asthma     Chronic pain disorder     GERD (gastroesophageal reflux disease)     Hyperlipidemia     Liver abscess     Meniscus tear     Pancreatitis        Past Surgical History:   Procedure Laterality Date    CHOLECYSTECTOMY      ESOPHAGOGASTRODUODENOSCOPY N/A 11/16/2017    Procedure: ESOPHAGOGASTRODUODENOSCOPY (EGD); Surgeon: Lisa Garcia MD;  [facility];   Service: Gastroenterology    ESOPHAGOGASTRODUODENOSCOPY N/A 4/19/2018 Procedure: ESOPHAGOGASTRODUODENOSCOPY (EGD); Surgeon: Manva Roberson MD;  [facility]; Service: Gastroenterology    ESOPHAGOGASTRODUODENOSCOPY N/A 5/10/2018    Procedure: ESOPHAGOGASTRODUODENOSCOPY (EGD); Surgeon: Prabhjot Garcia MD;  [facility]; Service: Gastroenterology    KNEE ARTHROSCOPY Bilateral     LIVER SURGERY      PANCREAS SURGERY      stents    IN INJECT NERV BLCK,CELIAC PLEXUS Bilateral 5/9/2017    Procedure: CELIAC PLEXUS BLOCK ;  Surgeon: Mack Jones MD;  [facility];  Service: Pain Management     IN INJECT NERV BLCK,CELIAC PLEXUS Bilateral 6/1/2017    Procedure: SPLANCHNIC NERVE BLOCK at T12;  Surgeon: Mack Jones MD;  [facility];  Service: Pain Management     IN INJECT NERV BLCK,CELIAC PLEXUS Bilateral 8/8/2017    Procedure: BILATERAL SPLANCHNIC NERVE BLOCK T12;  Surgeon: Mack Jones MD;  [facility];  Service: Pain Management     IN INJECT NERV Di Signs Bilateral 12/28/2017    Procedure: SPLANCHNIC NERVE BLOCK;  Surgeon: Mack Jones MD;  [facility];  Service: Pain Management     IN LAP,CHOLECYSTECTOMY N/A 2/14/2017    Procedure: LAPAROSCOPIC CHOLECYSTECTOMY, IOC, POSSIBLE OPEN ;  Surgeon: Lennox Hillock, MD;  [facility];  Service: General    ROTATOR CUFF REPAIR Right     SHOULDER ARTHROSCOPY      SHOULDER ARTHROSCOPY Right        Family History   Problem Relation Age of Onset    Cirrhosis Mother        Social History     Occupational History    Not on file       Social History Main Topics    Smoking status: Current Every Day Smoker     Packs/day: 0 50     Years: 20 00    Smokeless tobacco: Never Used    Alcohol use Yes      Comment: rarely    Drug use: No    Sexual activity: Not on file       Current Outpatient Prescriptions on File Prior to Visit   Medication Sig    acetaminophen (TYLENOL) 325 mg tablet Take 2 tablets (650 mg total) by mouth every 6 (six) hours as needed for fever  albuterol (PROVENTIL HFA,VENTOLIN HFA) 90 mcg/act inhaler Inhale 1-2 puffs every 4 (four) hours as needed for wheezing      Choline Fenofibrate (FENOFIBRIC ACID) 135 MG CPDR Take 1 capsule by mouth daily    docusate sodium (COLACE) 100 mg capsule Take 1 capsule (100 mg total) by mouth 2 (two) times a day as needed for constipation    EPINEPHrine (EPIPEN 2-ARIEL) 0 3 mg/0 3 mL SOAJ EpiPen 2-Ariel 0 3 MG/0 3ML Injection Solution Auto-injector  INJECT 0 3ML INTRAMUSCULARLY AS DIRECTED     Quantity: 1;  Refills: 1      Toro Briceño ; Active  2 Solution Auto-injector Pen    FLAXSEED, LINSEED, PO Take 1,000 mg by mouth 3 (three) times a day    niacin (NIASPAN) 500 mg CR tablet Take 1 tablet (500 mg total) by mouth daily with breakfast    omega-3-acid ethyl esters (LOVAZA) 1 g capsule Take 2 capsules (2 g total) by mouth 2 (two) times a day for 90 days    ondansetron (ZOFRAN-ODT) 4 mg disintegrating tablet Take 1 tablet (4 mg total) by mouth every 8 (eight) hours as needed for nausea or vomiting    oxyCODONE (ROXICODONE) 5 mg immediate release tablet Take 1 tablet (5 mg total) by mouth every 6 (six) hours as needed for moderate pain for up to 10 days Max Daily Amount: 20 mg    pancrelipase, Lip-Prot-Amyl, (CREON) 12,000 units capsule Take 12,000 units of lipase by mouth 3 (three) times a day with meals      Pancrelipase, Lip-Prot-Amyl, (CREON) 23721 units CPEP Take 1 capsule (36,000 Units total) by mouth 3 (three) times a day before meals    pantoprazole (PROTONIX) 40 mg tablet Take 1 tablet (40 mg total) by mouth 2 (two) times a day    ranitidine (ZANTAC) 150 MG capsule Take 150 mg by mouth 2 (two) times a day    rosuvastatin (CRESTOR) 20 MG tablet Take 1 tablet (20 mg total) by mouth daily    sucralfate (CARAFATE) 1 g tablet Take 1 tablet (1 g total) by mouth 4 (four) times a day for 7 days    traZODone (DESYREL) 50 mg tablet Take 1 tablet (50 mg total) by mouth daily at bedtime     Current Facility-Administered Medications on File Prior to Visit   Medication    oxyCODONE-acetaminophen (PERCOCET) 5-325 mg per tablet 2 tablet       Allergies   Allergen Reactions    Bee Venom Swelling       Physical Exam:    /74   Pulse 74   Resp 18   Ht 5' 11" (1 803 m)   Wt 87 5 kg (193 lb)   BMI 26 92 kg/m²     Constitutional:normal, well developed, well nourished, alert, in no distress and non-toxic and no overt pain behavior    Eyes:anicteric  HEENT:grossly intact  Neck:supple, symmetric, trachea midline and no masses   Pulmonary:even and unlabored  Cardiovascular:No edema or pitting edema present  Skin:Normal without rashes or lesions and well hydrated  Psychiatric:Mood and affect appropriate  Neurologic:Cranial Nerves II-XII grossly intact  Musculoskeletal:normal    Imaging

## 2018-05-15 ENCOUNTER — TELEPHONE (OUTPATIENT)
Dept: PAIN MEDICINE | Facility: MEDICAL CENTER | Age: 43
End: 2018-05-15

## 2018-05-21 NOTE — PRE-PROCEDURE INSTRUCTIONS
Pre-Surgery Instructions:   Medication Instructions    acetaminophen (TYLENOL) 325 mg tablet Patient was instructed by Physician and understands   albuterol (PROVENTIL HFA,VENTOLIN HFA) 90 mcg/act inhaler Patient was instructed by Physician and understands   Choline Fenofibrate (FENOFIBRIC ACID) 135 MG CPDR Patient was instructed by Physician and understands   cyclobenzaprine (FLEXERIL) 10 mg tablet Patient was instructed by Physician and understands   docusate sodium (COLACE) 100 mg capsule Patient was instructed by Physician and understands   FLAXSEED, LINSEED, PO Patient was instructed by Physician and understands   niacin (NIASPAN) 500 mg CR tablet Patient was instructed by Physician and understands   omega-3-acid ethyl esters (LOVAZA) 1 g capsule Patient was instructed by Physician and understands   oxyCODONE (ROXICODONE) 5 mg immediate release tablet Patient was instructed by Physician and understands   pancrelipase, Lip-Prot-Amyl, (CREON) 12,000 units capsule Patient was instructed by Physician and understands   Pancrelipase, Lip-Prot-Amyl, (CREON) 60378 units CPEP Patient was instructed by Physician and understands   pantoprazole (PROTONIX) 40 mg tablet Patient was instructed by Physician and understands   ranitidine (ZANTAC) 150 MG capsule Patient was instructed by Physician and understands   rosuvastatin (CRESTOR) 20 MG tablet Patient was instructed by Physician and understands   traZODone (DESYREL) 50 mg tablet Patient was instructed by Physician and understands

## 2018-05-22 ENCOUNTER — OFFICE VISIT (OUTPATIENT)
Dept: FAMILY MEDICINE CLINIC | Facility: CLINIC | Age: 43
End: 2018-05-22
Payer: COMMERCIAL

## 2018-05-22 VITALS
SYSTOLIC BLOOD PRESSURE: 125 MMHG | DIASTOLIC BLOOD PRESSURE: 80 MMHG | HEART RATE: 86 BPM | WEIGHT: 194.6 LBS | HEIGHT: 71 IN | OXYGEN SATURATION: 94 % | BODY MASS INDEX: 27.24 KG/M2

## 2018-05-22 DIAGNOSIS — G89.4 CHRONIC PAIN SYNDROME: ICD-10-CM

## 2018-05-22 DIAGNOSIS — K29.50 CHRONIC GASTRITIS WITHOUT BLEEDING, UNSPECIFIED GASTRITIS TYPE: ICD-10-CM

## 2018-05-22 DIAGNOSIS — K20.90 ESOPHAGITIS: Primary | ICD-10-CM

## 2018-05-22 PROCEDURE — 99495 TRANSJ CARE MGMT MOD F2F 14D: CPT | Performed by: FAMILY MEDICINE

## 2018-05-22 NOTE — PROGRESS NOTES
Assessment/Plan:     No problem-specific Assessment & Plan notes found for this encounter  Diagnoses and all orders for this visit:    Esophagitis    Chronic gastritis without bleeding, unspecified gastritis type    Chronic pain syndrome      to follow up with GI, to continue current medications, discussed dietary choices   chronic pain syndrome   to follow up with pain management  Subjective:     Patient ID: Anastasiia East is a 37 y o  male  Was in for pain , block probably wore off, they initially thought it was related to my stomach and pancreas  Doing better   Has appt with pain management ,   Has been to taking the flexeril but it does make me tired , was given by pain , he wants to get me back on the lorazone  Before I went into the ER , was doing fine   Was taking the alleve 5 per day , and did have the lasagna the day prior ,   Ws told about the gastritis by GI , after the   Has stopped the carafate   Now on the protonix BID , along with the zantac which seem s to be working ,  Weight back to normal           Review of Systems   Constitutional: Negative for appetite change, chills, fever and unexpected weight change  HENT: Negative for congestion, dental problem, ear pain, hearing loss, postnasal drip, rhinorrhea, sinus pain, sinus pressure, sneezing, sore throat, tinnitus and voice change  Eyes: Negative for visual disturbance  Respiratory: Negative for apnea, cough, chest tightness and shortness of breath  Cardiovascular: Negative for chest pain, palpitations and leg swelling  Gastrointestinal: Negative for abdominal pain, blood in stool, constipation, diarrhea, nausea and vomiting  Endocrine: Negative for cold intolerance, heat intolerance, polydipsia, polyphagia and polyuria  Genitourinary: Negative for decreased urine volume, difficulty urinating, dysuria, frequency and hematuria  Musculoskeletal: Negative for arthralgias, back pain, gait problem, joint swelling and myalgias  Skin: Negative for color change, rash and wound  Allergic/Immunologic: Negative for environmental allergies and food allergies  Neurological: Negative for dizziness, syncope, weakness, light-headedness, numbness and headaches  Hematological: Negative for adenopathy  Does not bruise/bleed easily  Psychiatric/Behavioral: Negative for sleep disturbance and suicidal ideas  The patient is not nervous/anxious  Objective:     Physical Exam   Constitutional: He is oriented to person, place, and time  He appears well-developed and well-nourished  HENT:   Head: Normocephalic and atraumatic  Eyes: EOM are normal    Neck: Normal range of motion  Neck supple  Cardiovascular: Normal rate, regular rhythm and normal heart sounds  Pulmonary/Chest: Effort normal and breath sounds normal    Abdominal: Soft  Bowel sounds are normal    Musculoskeletal: Normal range of motion  Neurological: He is alert and oriented to person, place, and time  He has normal reflexes  Skin: Skin is warm and dry  Psychiatric: He has a normal mood and affect  His behavior is normal  Judgment and thought content normal          Vitals:    05/22/18 1107   BP: 125/80   Pulse: 86   SpO2: 94%   Weight: 88 3 kg (194 lb 9 6 oz)   Height: 5' 11" (1 803 m)       Transitional Care Management Review:  Oral Jackson is a 37 y o  male here for TCM follow up       During the TCM phone call patient stated:    Date and time hospital follow up call was made:  5/14/2018 [facility] reviewed:  Records reviewed  Patient was hopsitalized at:  [facility]  Date of admission:  5/7/18  Date of discharge:  5/11/18  Were the patients medicaitons reviewed and updated:  No  Current symptoms:  None, Back pain - right side, Back pain - left side  Post hospital issues:  None  Should patient be enrolled in anticoag monitoring?:  No  Scheduled for follow up?:  Yes  Referrals needed:  Pain Management  Did you obtain your prescribed medications:  Yes  Do you need help managing your perscriptions or medications:  No  Is transportation to your appointments needed:  No  I have advised the patient to call PCP with any new or worsening symptoms (please type in name along with any credentials):  Shahram Macedo  Living Arrangements:  Family members  Are you recieving outpatient services:  No  Are you recieving home care services:  No  Are you using any community resources:  No  Current waiver service:  No  Have you fallen in the last 12 months:  No  Interperter language line required?:  No  Counseling:  Patient             LUANA Villegas

## 2018-05-28 ENCOUNTER — ANESTHESIA EVENT (OUTPATIENT)
Dept: PERIOP | Facility: HOSPITAL | Age: 43
End: 2018-05-28
Payer: COMMERCIAL

## 2018-05-29 ENCOUNTER — APPOINTMENT (OUTPATIENT)
Dept: RADIOLOGY | Facility: HOSPITAL | Age: 43
End: 2018-05-29
Payer: COMMERCIAL

## 2018-05-29 ENCOUNTER — ANESTHESIA (OUTPATIENT)
Dept: PERIOP | Facility: HOSPITAL | Age: 43
End: 2018-05-29
Payer: COMMERCIAL

## 2018-05-29 ENCOUNTER — HOSPITAL ENCOUNTER (OUTPATIENT)
Facility: HOSPITAL | Age: 43
Setting detail: OUTPATIENT SURGERY
Discharge: HOME | End: 2018-05-29
Attending: ANESTHESIOLOGY | Admitting: ANESTHESIOLOGY
Payer: COMMERCIAL

## 2018-05-29 VITALS
HEIGHT: 71 IN | WEIGHT: 189.82 LBS | RESPIRATION RATE: 20 BRPM | OXYGEN SATURATION: 96 % | TEMPERATURE: 97.9 F | SYSTOLIC BLOOD PRESSURE: 121 MMHG | DIASTOLIC BLOOD PRESSURE: 71 MMHG | BODY MASS INDEX: 26.57 KG/M2 | HEART RATE: 92 BPM

## 2018-05-29 DIAGNOSIS — G89.4 CHRONIC PAIN SYNDROME: Primary | ICD-10-CM

## 2018-05-29 PROCEDURE — 64530 N BLOCK INJ CELIAC PELUS: CPT | Performed by: ANESTHESIOLOGY

## 2018-05-29 PROCEDURE — 72070 X-RAY EXAM THORAC SPINE 2VWS: CPT

## 2018-05-29 RX ORDER — SODIUM CHLORIDE, SODIUM LACTATE, POTASSIUM CHLORIDE, CALCIUM CHLORIDE 600; 310; 30; 20 MG/100ML; MG/100ML; MG/100ML; MG/100ML
50 INJECTION, SOLUTION INTRAVENOUS CONTINUOUS
Status: DISCONTINUED | OUTPATIENT
Start: 2018-05-29 | End: 2018-05-29 | Stop reason: HOSPADM

## 2018-05-29 RX ORDER — FENTANYL CITRATE 50 UG/ML
INJECTION, SOLUTION INTRAMUSCULAR; INTRAVENOUS AS NEEDED
Status: DISCONTINUED | OUTPATIENT
Start: 2018-05-29 | End: 2018-05-29 | Stop reason: SURG

## 2018-05-29 RX ORDER — 0.9 % SODIUM CHLORIDE 0.9 %
VIAL (ML) INJECTION AS NEEDED
Status: DISCONTINUED | OUTPATIENT
Start: 2018-05-29 | End: 2018-05-29 | Stop reason: HOSPADM

## 2018-05-29 RX ORDER — LIDOCAINE HYDROCHLORIDE 20 MG/ML
INJECTION, SOLUTION INFILTRATION; PERINEURAL AS NEEDED
Status: DISCONTINUED | OUTPATIENT
Start: 2018-05-29 | End: 2018-05-29 | Stop reason: HOSPADM

## 2018-05-29 RX ORDER — PROPOFOL 10 MG/ML
INJECTION, EMULSION INTRAVENOUS CONTINUOUS PRN
Status: DISCONTINUED | OUTPATIENT
Start: 2018-05-29 | End: 2018-05-29 | Stop reason: SURG

## 2018-05-29 RX ORDER — PROPOFOL 10 MG/ML
INJECTION, EMULSION INTRAVENOUS AS NEEDED
Status: DISCONTINUED | OUTPATIENT
Start: 2018-05-29 | End: 2018-05-29 | Stop reason: SURG

## 2018-05-29 RX ORDER — SODIUM CHLORIDE, SODIUM LACTATE, POTASSIUM CHLORIDE, CALCIUM CHLORIDE 600; 310; 30; 20 MG/100ML; MG/100ML; MG/100ML; MG/100ML
INJECTION, SOLUTION INTRAVENOUS CONTINUOUS PRN
Status: DISCONTINUED | OUTPATIENT
Start: 2018-05-29 | End: 2018-05-29 | Stop reason: SURG

## 2018-05-29 RX ORDER — BUPIVACAINE HYDROCHLORIDE 2.5 MG/ML
INJECTION, SOLUTION INFILTRATION; PERINEURAL AS NEEDED
Status: DISCONTINUED | OUTPATIENT
Start: 2018-05-29 | End: 2018-05-29 | Stop reason: HOSPADM

## 2018-05-29 RX ORDER — MIDAZOLAM HYDROCHLORIDE 1 MG/ML
INJECTION INTRAMUSCULAR; INTRAVENOUS AS NEEDED
Status: DISCONTINUED | OUTPATIENT
Start: 2018-05-29 | End: 2018-05-29 | Stop reason: SURG

## 2018-05-29 RX ORDER — DEXAMETHASONE SODIUM PHOSPHATE 10 MG/ML
INJECTION, SOLUTION INTRAMUSCULAR; INTRAVENOUS AS NEEDED
Status: DISCONTINUED | OUTPATIENT
Start: 2018-05-29 | End: 2018-05-29 | Stop reason: HOSPADM

## 2018-05-29 RX ORDER — OXYCODONE HYDROCHLORIDE 5 MG/1
5 TABLET ORAL 2 TIMES DAILY PRN
Qty: 14 TABLET | Refills: 0 | Status: ON HOLD | OUTPATIENT
Start: 2018-05-29 | End: 2018-06-26

## 2018-05-29 RX ORDER — FENTANYL CITRATE/PF 50 MCG/ML
50 SYRINGE (ML) INJECTION
Status: DISCONTINUED | OUTPATIENT
Start: 2018-05-29 | End: 2018-05-29 | Stop reason: HOSPADM

## 2018-05-29 RX ADMIN — PROPOFOL 100 MCG/KG/MIN: 10 INJECTION, EMULSION INTRAVENOUS at 08:21

## 2018-05-29 RX ADMIN — PROPOFOL 50 MG: 10 INJECTION, EMULSION INTRAVENOUS at 08:21

## 2018-05-29 RX ADMIN — FENTANYL CITRATE 50 MCG: 50 INJECTION INTRAMUSCULAR; INTRAVENOUS at 09:12

## 2018-05-29 RX ADMIN — HYDROMORPHONE HYDROCHLORIDE 0.5 MG: 1 INJECTION, SOLUTION INTRAMUSCULAR; INTRAVENOUS; SUBCUTANEOUS at 09:15

## 2018-05-29 RX ADMIN — FENTANYL CITRATE 50 MCG: 50 INJECTION INTRAMUSCULAR; INTRAVENOUS at 09:08

## 2018-05-29 RX ADMIN — HYDROMORPHONE HYDROCHLORIDE 0.5 MG: 1 INJECTION, SOLUTION INTRAMUSCULAR; INTRAVENOUS; SUBCUTANEOUS at 09:26

## 2018-05-29 RX ADMIN — FENTANYL CITRATE 100 MCG: 50 INJECTION, SOLUTION INTRAMUSCULAR; INTRAVENOUS at 08:19

## 2018-05-29 RX ADMIN — PROPOFOL 50 MG: 10 INJECTION, EMULSION INTRAVENOUS at 08:33

## 2018-05-29 RX ADMIN — HYDROMORPHONE HYDROCHLORIDE 0.5 MG: 1 INJECTION, SOLUTION INTRAMUSCULAR; INTRAVENOUS; SUBCUTANEOUS at 09:20

## 2018-05-29 RX ADMIN — MIDAZOLAM HYDROCHLORIDE 2 MG: 1 INJECTION, SOLUTION INTRAMUSCULAR; INTRAVENOUS at 08:19

## 2018-05-29 RX ADMIN — PROPOFOL 50 MG: 10 INJECTION, EMULSION INTRAVENOUS at 08:39

## 2018-05-29 RX ADMIN — SODIUM CHLORIDE, SODIUM LACTATE, POTASSIUM CHLORIDE, AND CALCIUM CHLORIDE: .6; .31; .03; .02 INJECTION, SOLUTION INTRAVENOUS at 08:18

## 2018-05-29 NOTE — H&P
History of Present Illness: The patient is a 37 y o  male who presents with complaints of abdominal pain secondary to chronic Pancreatitis  Patient is here today for a Bilateral Splanchnic Nerve Block  Pain is rated 7/10  Patient Active Problem List   Diagnosis    Pancreatic lesion    Renal mass    Smoker    RBBB    Epigastric pain    Acute gastritis without hemorrhage    Abdominal pain    Elevated liver enzymes    Chronic pancreatitis (HCC)    Leukopenia    Hypertriglyceridemia    Chronic pain syndrome    Esophagitis    Other chronic pancreatitis (HCC)    Chronic gastritis without bleeding       Past Medical History:   Diagnosis Date    Asthma     Chronic pain disorder     GERD (gastroesophageal reflux disease)     Hyperlipidemia     Liver abscess     Meniscus tear     left knee work injury last assessed 08/24/2016    Pancreatitis     Pneumonia        Past Surgical History:   Procedure Laterality Date    CHOLECYSTECTOMY      ESOPHAGOGASTRODUODENOSCOPY N/A 11/16/2017    Procedure: ESOPHAGOGASTRODUODENOSCOPY (EGD); Surgeon: Anya Kumar MD;  [facility]; Service: Gastroenterology    ESOPHAGOGASTRODUODENOSCOPY N/A 4/19/2018    Procedure: ESOPHAGOGASTRODUODENOSCOPY (EGD); Surgeon: Gilbert Huerta MD;  [facility]; Service: Gastroenterology    ESOPHAGOGASTRODUODENOSCOPY N/A 5/10/2018    Procedure: ESOPHAGOGASTRODUODENOSCOPY (EGD); Surgeon: Gina Cherry MD;  [facility];   Service: Gastroenterology    KNEE ARTHROSCOPY Bilateral     KNEE ARTHROSCOPY Right 2009    cibishino last assessed 08/24/2016    LIVER SURGERY      PANCREAS SURGERY      stents    [place] INJECT NERV BLCK,CELIAC PLEXUS Bilateral 5/9/2017    Procedure: CELIAC PLEXUS BLOCK ;  Surgeon: Jimmy Ashraf MD;  [facility];  Service: Pain Management     [place] INJECT NERV BLCK,CELIAC PLEXUS Bilateral 6/1/2017    Procedure: SPLANCHNIC NERVE BLOCK at T12;  Surgeon: Jimmy Ashraf MD;  [facility];  Service: Pain Management     ID INJECT NERV BLCK,CELIAC PLEXUS Bilateral 8/8/2017    Procedure: BILATERAL SPLANCHNIC NERVE BLOCK T12;  Surgeon: Dallas Fernandez MD;  [facility];  Service: Pain Management     ID INJECT NERV Gearl Goodie Bilateral 12/28/2017    Procedure: SPLANCHNIC NERVE BLOCK;  Surgeon: Dallas Fernandez MD;  [facility];  Service: Pain Management     ID LAP,CHOLECYSTECTOMY N/A 2/14/2017    Procedure: LAPAROSCOPIC CHOLECYSTECTOMY, IOC, POSSIBLE OPEN ;  Surgeon: Claudia Saleh MD;  [facility];  Service: General    ROTATOR CUFF REPAIR Right     SHOULDER ARTHROSCOPY      SHOULDER ARTHROSCOPY Right        No current facility-administered medications for this encounter  Facility-Administered Medications Ordered in Other Encounters:     oxyCODONE-acetaminophen (PERCOCET) 5-325 mg per tablet 2 tablet, 2 tablet, Oral, Once, Dallas Fernandez MD    Allergies   Allergen Reactions    Bee Venom Swelling       Physical Exam:   Vitals:    05/29/18 0747   BP: 142/90   Pulse: 90   Resp: 18   Temp: 98 5 °F (36 9 °C)   SpO2: 97%     General: Awake, Alert, Oriented x 3, Mood and affect appropriate  Respiratory: Respirations even and unlabored  Cardiovascular: Peripheral pulses intact; no edema  Musculoskeletal Exam: WNL  Tenderness to palpation at the mid-epigastric regions and upper quadrants b/l      ASA Score: 2    Assessment: Chronic Pancreatitis    Plan: Bilateral Splanchnic Nerve Block

## 2018-05-29 NOTE — ANESTHESIA PREPROCEDURE EVALUATION
Review of Systems/Medical History  Patient summary reviewed  Chart reviewed  No history of anesthetic complications     Cardiovascular  Exercise tolerance (METS): >4,  Hyperlipidemia,    Pulmonary       GI/Hepatic    GERD well controlled, Liver disease , Pancreatic problem,             Endo/Other     GYN       Hematology   Musculoskeletal       Neurology   Psychology           Physical Exam    Airway    Mallampati score: II  TM Distance: >3 FB  Neck ROM: full     Dental   No notable dental hx     Cardiovascular      Pulmonary      Other Findings        Anesthesia Plan  ASA Score- 3     Anesthesia Type- IV sedation with anesthesia with ASA Monitors  Additional Monitors:   Airway Plan:         Plan Factors-    Induction- intravenous  Postoperative Plan-     Informed Consent- Anesthetic plan and risks discussed with patient  I personally reviewed this patient with the CRNA  Discussed and agreed on the Anesthesia Plan with the CRNA  Stef Arredondo

## 2018-05-29 NOTE — OP NOTE
OPERATIVE REPORT  PATIENT NAME: Craig Oden    :  1975  MRN: [id number]  Pt [facility]: [facility]    SURGERY DATE: 2018    Surgeon(s) and Role:     * Arsh Roman MD - Primary    Preop Diagnosis:  Chronic pain syndrome [G89 4]  Other chronic pancreatitis ([address] [facility] ) [K86 1]    Post-Op Diagnosis Codes:     * Chronic pain syndrome [G89 4]     * Other chronic pancreatitis ([facility] [address] ) [K86 1]    Procedure(s) (LRB):  SPLANCHNIC NERVE BLOCK (Bilateral)    Specimen(s):  * No specimens in log *    Estimated Blood Loss:   Minimal    Drains: None        Anesthesia Type:   IV Sedation with Anesthesia    Operative Indications:  Chronic pain syndrome [G89 4]  Other chronic pancreatitis ([address] ) [K86 1]    Operative Findings:  None    Complications:   None    Procedure and Technique:     MEDICATIONS INJECTED: 12 mL of bupivacaine 0 25% (6 mL per side) plus 20mg of dexamethasone (10mg per side) in 4cc of sterile saline  LOCAL ANESTHETIC USED: 5 mL of 2% lidocaine  ESTIMATED BLOOD LOSS: None  COMPLICATIONS: None      TECHNIQUE: Time-out was taken to identify the correct patient, procedure and side prior to starting the procedure  With the patient lying prone, the area to be injected was widely prepped and draped in the usual sterile fashion using DuraPrep and a drape  The anatomical target site was determined using fluoroscopy by squaring off the superior endplate of A19, ipsilaterally obliquing the C-arm intensifier until the tip of the T12 transverse process was at the anterolateral border of the T12 vertebral body, and then moving the C-arm intensifier caudal to move the 12th rib out of the fluoroscopic target view  Local anesthetic was given by raising a skin wheal and going down to the hub of the 27-gauge 1 25-inch needle  A 22-gauge 5-inch Quincke needle was advanced at the midbody of T12 to a point in the anterior one-third of the T12 vertebral body utilizing A-P and lateral intermittent fluoroscopy   3 mL of Omnipaque 300 contrast was injected to show unilateral spread along the anterolateral surface of the T12 vertebral body, and no vascular uptake       The above technique was then repeated for the opposite side with contrast injected again to show unilateral spread superior and inferior along the anterolateral T12 body  Medication was then injected slowly in 3 mL increments after negative aspirations of the needle to confirm the needle had not slipped intravascular  The procedure was completed without complications and was tolerated well  The patient was monitored after the procedure  The patient and his wife were given post-procedure and discharge instructions to follow at home  The patient was discharged in stable condition  The patient will call the office to make a follow up appointment in 4 weeks       I was present for the entire procedure     Patient Disposition:  PACU immediately post-op and discharge home with a reliable escort        SIGNATURE: Lj Blanton MD  DATE: May 29, 2018  TIME: 8:57 AM

## 2018-05-29 NOTE — DISCHARGE INSTRUCTIONS
SPLANCHNIC NERVE BLOCK (DISCHARGE CARE)      ACTIVITY  · DO NOT DRIVE or operate machinery today  · You may resume normal activities tomorrow as tolerated  CARE OF THE INJECTION SITE  · For soreness or pain, apply ice to the area today (20 minute on/20 minutes off)  · [facility] if you have any of the following: redness, drainage, swelling or fever above 100°F     SPECIAL INSTRUCTIONS  · You may experience the following up for 6 hours after the procedure:  · Diarrhea  · Dizziness  Lying down usually helps  Sit up and then stand gradually, with assistance if needed  · Notify the [facility] or go to the nearest emergency room if you have severe abdominal pain, bloody urine or inability to pass urine  Be sure to let a medical professional know that you have had a Splanchnic nerve block  MEDICATIONS  · Continue to take all routine medications  · Our office may have instructed you to hold some medications  If you have any problems specifically related to your procedure, please call our office at [phone]  Problems not related to your procedure should be directed at your primary care physician

## 2018-06-05 ENCOUNTER — TELEPHONE (OUTPATIENT)
Dept: PAIN MEDICINE | Facility: MEDICAL CENTER | Age: 43
End: 2018-06-05

## 2018-06-05 NOTE — TELEPHONE ENCOUNTER
Pt is calling to give an update on how he is feeling since is injections 1 week from today  Pt states he is still having a lot of pain on the right side of abdomen, pt started to take Lorzone 750 mg per Dr Francy Castorena orders but the pain has worsen while taking it  Pt would like a call back at [phone]      Pt would like to go back to work Monday 6/11 and his job would need a note stating that Dr Elba Redmond approves work duties with no restrictions

## 2018-06-05 NOTE — TELEPHONE ENCOUNTER
I called patient and informed him that more pain and soreness is typical after the procedure and he should give procedure some more time  Continue lorzone as directed  Please can you draft an note stating that patient can return to full duty on Monday 6/11/18 without any restrictions  I will sign off on it  Thank you

## 2018-06-11 NOTE — TELEPHONE ENCOUNTER
Patient called stating he is having a lot of pain after he had his injections  Patient also stated he stopped at the office on Friday to  the letter but the office was closed   Would like a c/b [phone]

## 2018-06-11 NOTE — TELEPHONE ENCOUNTER
Rep (disability) called and LM on [employer] @ 221pm requesting info to be sent to [phone] or call [phone] Claim# [id number]  Stated patient is stating he is unable to work

## 2018-06-11 NOTE — TELEPHONE ENCOUNTER
I spoke with patient today and advised him that soreness and pain is typical following procedure  I also informed him that he see me asap for follow up re-evaluation  Please call patient and schedule f/u OV appt for re-evaluation  Thank you

## 2018-06-11 NOTE — TELEPHONE ENCOUNTER
S/w pt  Staets right sided abdominal pain still present and he has been "in agony since Saturday " States out of roxicodone at this point and lorzone is not helping  Advised SI not in until 2pm but will cb later with any recommendations

## 2018-06-12 DIAGNOSIS — E78.49 OTHER HYPERLIPIDEMIA: ICD-10-CM

## 2018-06-12 DIAGNOSIS — K21.9 GASTROESOPHAGEAL REFLUX DISEASE WITHOUT ESOPHAGITIS: ICD-10-CM

## 2018-06-12 RX ORDER — PANTOPRAZOLE SODIUM 40 MG/1
40 TABLET, DELAYED RELEASE ORAL 2 TIMES DAILY
Qty: 180 TABLET | Refills: 0 | Status: SHIPPED | OUTPATIENT
Start: 2018-06-12 | End: 2018-10-01 | Stop reason: SDUPTHER

## 2018-06-12 RX ORDER — NIACIN 500 MG/1
500 TABLET, EXTENDED RELEASE ORAL
Qty: 90 TABLET | Refills: 0 | Status: SHIPPED | OUTPATIENT
Start: 2018-06-12 | End: 2018-10-01 | Stop reason: SDUPTHER

## 2018-06-13 ENCOUNTER — OFFICE VISIT (OUTPATIENT)
Dept: PAIN MEDICINE | Facility: CLINIC | Age: 43
End: 2018-06-13
Payer: COMMERCIAL

## 2018-06-13 ENCOUNTER — TELEPHONE (OUTPATIENT)
Dept: PAIN MEDICINE | Facility: CLINIC | Age: 43
End: 2018-06-13

## 2018-06-13 VITALS
HEART RATE: 103 BPM | RESPIRATION RATE: 18 BRPM | BODY MASS INDEX: 26.46 KG/M2 | DIASTOLIC BLOOD PRESSURE: 92 MMHG | HEIGHT: 71 IN | WEIGHT: 189 LBS | SYSTOLIC BLOOD PRESSURE: 136 MMHG

## 2018-06-13 DIAGNOSIS — K86.1 CHRONIC PANCREATITIS, UNSPECIFIED PANCREATITIS TYPE (HCC): Primary | ICD-10-CM

## 2018-06-13 DIAGNOSIS — G89.4 CHRONIC PAIN SYNDROME: ICD-10-CM

## 2018-06-13 PROCEDURE — 99213 OFFICE O/P EST LOW 20 MIN: CPT | Performed by: ANESTHESIOLOGY

## 2018-06-13 RX ORDER — CYCLOBENZAPRINE HCL 10 MG
10 TABLET ORAL 3 TIMES DAILY PRN
Qty: 90 TABLET | Refills: 0 | Status: ON HOLD | OUTPATIENT
Start: 2018-06-13 | End: 2018-09-11

## 2018-06-13 NOTE — TELEPHONE ENCOUNTER
PT dropped off [facility] paperwork to be filled out  I put on Dr Mccarthy [employer] desk and I'm to call pt when done to pick it up

## 2018-06-13 NOTE — LETTER
June 13, 2018     Patient: Krystian Wallace   YOB: 1975   Date of Visit: 6/13/2018       To Whom it May Concern:    Krystian Wallace is under my professional care  He was seen in my office on 6/13/2018  He is currently undergoing treatment  He may not return to work until he is cleared by me in 4 weeks  If you have any questions or concerns, please don't hesitate to call           Sincerely,          Ismael Brown MD        CC: Lashanda Ramos MD

## 2018-06-19 DIAGNOSIS — E78.2 MIXED HYPERLIPIDEMIA: ICD-10-CM

## 2018-06-21 ENCOUNTER — HOSPITAL ENCOUNTER (INPATIENT)
Facility: HOSPITAL | Age: 43
LOS: 4 days | Discharge: HOME | DRG: 439 | End: 2018-06-26
Attending: EMERGENCY MEDICINE | Admitting: INTERNAL MEDICINE
Payer: COMMERCIAL

## 2018-06-21 ENCOUNTER — APPOINTMENT (EMERGENCY)
Dept: CT IMAGING | Facility: HOSPITAL | Age: 43
DRG: 439 | End: 2018-06-21
Payer: COMMERCIAL

## 2018-06-21 ENCOUNTER — TELEPHONE (OUTPATIENT)
Dept: PAIN MEDICINE | Facility: MEDICAL CENTER | Age: 43
End: 2018-06-21

## 2018-06-21 DIAGNOSIS — R52 INTRACTABLE PAIN: ICD-10-CM

## 2018-06-21 DIAGNOSIS — E78.1 HYPERTRIGLYCERIDEMIA: ICD-10-CM

## 2018-06-21 DIAGNOSIS — G89.4 CHRONIC PAIN SYNDROME: ICD-10-CM

## 2018-06-21 DIAGNOSIS — Z72.0 NICOTINE ABUSE: ICD-10-CM

## 2018-06-21 DIAGNOSIS — E78.2 MIXED HYPERLIPIDEMIA: ICD-10-CM

## 2018-06-21 DIAGNOSIS — K85.90 PANCREATITIS: Primary | ICD-10-CM

## 2018-06-21 LAB
ALBUMIN SERPL BCP-MCNC: 4 G/DL (ref 3.5–5)
ALP SERPL-CCNC: 69 U/L (ref 46–116)
ALT SERPL W P-5'-P-CCNC: 31 U/L (ref 12–78)
ANION GAP SERPL CALCULATED.3IONS-SCNC: 9 MMOL/L (ref 4–13)
AST SERPL W P-5'-P-CCNC: 20 U/L (ref 5–45)
BASOPHILS # BLD AUTO: 0.05 THOUSANDS/ΜL (ref 0–0.1)
BASOPHILS NFR BLD AUTO: 0 % (ref 0–1)
BILIRUB DIRECT SERPL-MCNC: 0.07 MG/DL (ref 0–0.2)
BILIRUB SERPL-MCNC: 0.5 MG/DL (ref 0.2–1)
BUN SERPL-MCNC: 19 MG/DL (ref 5–25)
CALCIUM SERPL-MCNC: 8.9 MG/DL (ref 8.3–10.1)
CHLORIDE SERPL-SCNC: 101 MMOL/L (ref 100–108)
CO2 SERPL-SCNC: 25 MMOL/L (ref 21–32)
CREAT SERPL-MCNC: 0.9 MG/DL (ref 0.6–1.3)
EOSINOPHIL # BLD AUTO: 0.2 THOUSAND/ΜL (ref 0–0.61)
EOSINOPHIL NFR BLD AUTO: 1 % (ref 0–6)
ERYTHROCYTE [DISTWIDTH] IN BLOOD BY AUTOMATED COUNT: 12.7 % (ref 11.6–15.1)
GFR SERPL CREATININE-BSD FRML MDRD: 103 ML/MIN/1.73SQ M
GLUCOSE SERPL-MCNC: 125 MG/DL (ref 65–140)
HCT VFR BLD AUTO: 44.2 % (ref 36.5–49.3)
HGB BLD-MCNC: 15.2 G/DL (ref 12–17)
IMM GRANULOCYTES # BLD AUTO: 0.09 THOUSAND/UL (ref 0–0.2)
IMM GRANULOCYTES NFR BLD AUTO: 1 % (ref 0–2)
INR PPP: 0.99 (ref 0.86–1.17)
LIPASE SERPL-CCNC: 611 U/L (ref 73–393)
LYMPHOCYTES # BLD AUTO: 2.05 THOUSANDS/ΜL (ref 0.6–4.47)
LYMPHOCYTES NFR BLD AUTO: 15 % (ref 14–44)
MCH RBC QN AUTO: 31.7 PG (ref 26.8–34.3)
MCHC RBC AUTO-ENTMCNC: 34.4 G/DL (ref 31.4–37.4)
MCV RBC AUTO: 92 FL (ref 82–98)
MONOCYTES # BLD AUTO: 1 THOUSAND/ΜL (ref 0.17–1.22)
MONOCYTES NFR BLD AUTO: 7 % (ref 4–12)
NEUTROPHILS # BLD AUTO: 10.5 THOUSANDS/ΜL (ref 1.85–7.62)
NEUTS SEG NFR BLD AUTO: 76 % (ref 43–75)
NRBC BLD AUTO-RTO: 0 /100 WBCS
PLATELET # BLD AUTO: 236 THOUSANDS/UL (ref 149–390)
PMV BLD AUTO: 9.9 FL (ref 8.9–12.7)
POTASSIUM SERPL-SCNC: 3.3 MMOL/L (ref 3.5–5.3)
PROT SERPL-MCNC: 7 G/DL (ref 6.4–8.2)
PROTHROMBIN TIME: 13 SECONDS (ref 11.8–14.2)
RBC # BLD AUTO: 4.8 MILLION/UL (ref 3.88–5.62)
SODIUM SERPL-SCNC: 135 MMOL/L (ref 136–145)
TROPONIN I SERPL-MCNC: <0.02 NG/ML
WBC # BLD AUTO: 13.89 THOUSAND/UL (ref 4.31–10.16)

## 2018-06-21 PROCEDURE — 96374 THER/PROPH/DIAG INJ IV PUSH: CPT

## 2018-06-21 PROCEDURE — 80076 HEPATIC FUNCTION PANEL: CPT | Performed by: EMERGENCY MEDICINE

## 2018-06-21 PROCEDURE — 85025 COMPLETE CBC W/AUTO DIFF WBC: CPT | Performed by: EMERGENCY MEDICINE

## 2018-06-21 PROCEDURE — 84484 ASSAY OF TROPONIN QUANT: CPT | Performed by: EMERGENCY MEDICINE

## 2018-06-21 PROCEDURE — 96375 TX/PRO/DX INJ NEW DRUG ADDON: CPT

## 2018-06-21 PROCEDURE — 36415 COLL VENOUS BLD VENIPUNCTURE: CPT | Performed by: EMERGENCY MEDICINE

## 2018-06-21 PROCEDURE — 85610 PROTHROMBIN TIME: CPT | Performed by: EMERGENCY MEDICINE

## 2018-06-21 PROCEDURE — 96361 HYDRATE IV INFUSION ADD-ON: CPT

## 2018-06-21 PROCEDURE — 80048 BASIC METABOLIC PNL TOTAL CA: CPT | Performed by: EMERGENCY MEDICINE

## 2018-06-21 PROCEDURE — 93005 ELECTROCARDIOGRAM TRACING: CPT

## 2018-06-21 PROCEDURE — 96376 TX/PRO/DX INJ SAME DRUG ADON: CPT

## 2018-06-21 PROCEDURE — 83690 ASSAY OF LIPASE: CPT | Performed by: EMERGENCY MEDICINE

## 2018-06-21 RX ORDER — ONDANSETRON 2 MG/ML
4 INJECTION INTRAMUSCULAR; INTRAVENOUS ONCE
Status: COMPLETED | OUTPATIENT
Start: 2018-06-21 | End: 2018-06-21

## 2018-06-21 RX ADMIN — ONDANSETRON 4 MG: 2 INJECTION, SOLUTION INTRAMUSCULAR; INTRAVENOUS at 22:34

## 2018-06-21 RX ADMIN — HYDROMORPHONE HYDROCHLORIDE 1 MG: 1 INJECTION, SOLUTION INTRAMUSCULAR; INTRAVENOUS; SUBCUTANEOUS at 23:04

## 2018-06-21 RX ADMIN — HYDROMORPHONE HYDROCHLORIDE 1 MG: 1 INJECTION, SOLUTION INTRAMUSCULAR; INTRAVENOUS; SUBCUTANEOUS at 22:34

## 2018-06-21 RX ADMIN — SODIUM CHLORIDE 1000 ML: 0.9 INJECTION, SOLUTION INTRAVENOUS at 22:37

## 2018-06-21 NOTE — TELEPHONE ENCOUNTER
Afua Roman from [facility] is calling asking if Dr Alexsandra Gupta can fax over office notes to support a short disability claim that pt filed   Afua Roman can be reached at [phone]    The claim# [id number]

## 2018-06-21 NOTE — TELEPHONE ENCOUNTER
S/W [employer] rep regarding below message  Requested dates for STD Claim  Need beginning and end date

## 2018-06-22 ENCOUNTER — TELEPHONE (OUTPATIENT)
Dept: PAIN MEDICINE | Facility: MEDICAL CENTER | Age: 43
End: 2018-06-22

## 2018-06-22 ENCOUNTER — APPOINTMENT (EMERGENCY)
Dept: CT IMAGING | Facility: HOSPITAL | Age: 43
DRG: 439 | End: 2018-06-22
Payer: COMMERCIAL

## 2018-06-22 DIAGNOSIS — G89.4 CHRONIC PAIN SYNDROME: Primary | ICD-10-CM

## 2018-06-22 LAB
ALBUMIN SERPL BCP-MCNC: 4 G/DL (ref 3.5–5)
ALP SERPL-CCNC: 68 U/L (ref 46–116)
ALT SERPL W P-5'-P-CCNC: 28 U/L (ref 12–78)
ANION GAP SERPL CALCULATED.3IONS-SCNC: 12 MMOL/L (ref 4–13)
AST SERPL W P-5'-P-CCNC: 16 U/L (ref 5–45)
ATRIAL RATE: 85 BPM
BASOPHILS # BLD AUTO: 0.04 THOUSANDS/ΜL (ref 0–0.1)
BASOPHILS NFR BLD AUTO: 0 % (ref 0–1)
BILIRUB SERPL-MCNC: 1.2 MG/DL (ref 0.2–1)
BUN SERPL-MCNC: 14 MG/DL (ref 5–25)
CALCIUM SERPL-MCNC: 9.2 MG/DL (ref 8.3–10.1)
CHLORIDE SERPL-SCNC: 102 MMOL/L (ref 100–108)
CO2 SERPL-SCNC: 24 MMOL/L (ref 21–32)
CREAT SERPL-MCNC: 0.84 MG/DL (ref 0.6–1.3)
EOSINOPHIL # BLD AUTO: 0.24 THOUSAND/ΜL (ref 0–0.61)
EOSINOPHIL NFR BLD AUTO: 2 % (ref 0–6)
ERYTHROCYTE [DISTWIDTH] IN BLOOD BY AUTOMATED COUNT: 12.4 % (ref 11.6–15.1)
GFR SERPL CREATININE-BSD FRML MDRD: 106 ML/MIN/1.73SQ M
GLUCOSE SERPL-MCNC: 131 MG/DL (ref 65–140)
HCT VFR BLD AUTO: 44.2 % (ref 36.5–49.3)
HGB BLD-MCNC: 15 G/DL (ref 12–17)
IMM GRANULOCYTES # BLD AUTO: 0.06 THOUSAND/UL (ref 0–0.2)
IMM GRANULOCYTES NFR BLD AUTO: 0 % (ref 0–2)
LYMPHOCYTES # BLD AUTO: 2.77 THOUSANDS/ΜL (ref 0.6–4.47)
LYMPHOCYTES NFR BLD AUTO: 20 % (ref 14–44)
MCH RBC QN AUTO: 31.3 PG (ref 26.8–34.3)
MCHC RBC AUTO-ENTMCNC: 33.9 G/DL (ref 31.4–37.4)
MCV RBC AUTO: 92 FL (ref 82–98)
MONOCYTES # BLD AUTO: 1 THOUSAND/ΜL (ref 0.17–1.22)
MONOCYTES NFR BLD AUTO: 7 % (ref 4–12)
NEUTROPHILS # BLD AUTO: 9.45 THOUSANDS/ΜL (ref 1.85–7.62)
NEUTS SEG NFR BLD AUTO: 71 % (ref 43–75)
NRBC BLD AUTO-RTO: 0 /100 WBCS
P AXIS: 51 DEGREES
PLATELET # BLD AUTO: 217 THOUSANDS/UL (ref 149–390)
PMV BLD AUTO: 9.3 FL (ref 8.9–12.7)
POTASSIUM SERPL-SCNC: 3.6 MMOL/L (ref 3.5–5.3)
PR INTERVAL: 144 MS
PROT SERPL-MCNC: 7.1 G/DL (ref 6.4–8.2)
QRS AXIS: 46 DEGREES
QRSD INTERVAL: 112 MS
QT INTERVAL: 394 MS
QTC INTERVAL: 468 MS
RBC # BLD AUTO: 4.79 MILLION/UL (ref 3.88–5.62)
SODIUM SERPL-SCNC: 138 MMOL/L (ref 136–145)
T WAVE AXIS: 32 DEGREES
TRIGL SERPL-MCNC: 898 MG/DL
VENTRICULAR RATE: 85 BPM
WBC # BLD AUTO: 13.56 THOUSAND/UL (ref 4.31–10.16)

## 2018-06-22 PROCEDURE — 99285 EMERGENCY DEPT VISIT HI MDM: CPT

## 2018-06-22 PROCEDURE — 99223 1ST HOSP IP/OBS HIGH 75: CPT | Performed by: INTERNAL MEDICINE

## 2018-06-22 PROCEDURE — 85025 COMPLETE CBC W/AUTO DIFF WBC: CPT | Performed by: PHYSICIAN ASSISTANT

## 2018-06-22 PROCEDURE — 36415 COLL VENOUS BLD VENIPUNCTURE: CPT | Performed by: PHYSICIAN ASSISTANT

## 2018-06-22 PROCEDURE — 84478 ASSAY OF TRIGLYCERIDES: CPT | Performed by: PHYSICIAN ASSISTANT

## 2018-06-22 PROCEDURE — 93010 ELECTROCARDIOGRAM REPORT: CPT | Performed by: INTERNAL MEDICINE

## 2018-06-22 PROCEDURE — 80053 COMPREHEN METABOLIC PANEL: CPT | Performed by: PHYSICIAN ASSISTANT

## 2018-06-22 PROCEDURE — 74177 CT ABD & PELVIS W/CONTRAST: CPT

## 2018-06-22 PROCEDURE — 99254 IP/OBS CNSLTJ NEW/EST MOD 60: CPT | Performed by: INTERNAL MEDICINE

## 2018-06-22 PROCEDURE — 96376 TX/PRO/DX INJ SAME DRUG ADON: CPT

## 2018-06-22 RX ORDER — CHLORAL HYDRATE 500 MG
1000 CAPSULE ORAL DAILY
Status: DISCONTINUED | OUTPATIENT
Start: 2018-06-22 | End: 2018-06-25

## 2018-06-22 RX ORDER — FAMOTIDINE 20 MG/1
20 TABLET, FILM COATED ORAL 2 TIMES DAILY
Status: DISCONTINUED | OUTPATIENT
Start: 2018-06-22 | End: 2018-06-26 | Stop reason: HOSPADM

## 2018-06-22 RX ORDER — PROCHLORPERAZINE 25 MG
25 SUPPOSITORY, RECTAL RECTAL EVERY 12 HOURS
Status: DISCONTINUED | OUTPATIENT
Start: 2018-06-22 | End: 2018-06-22

## 2018-06-22 RX ORDER — HYDRALAZINE HYDROCHLORIDE 20 MG/ML
10 INJECTION INTRAMUSCULAR; INTRAVENOUS EVERY 6 HOURS PRN
Status: DISCONTINUED | OUTPATIENT
Start: 2018-06-22 | End: 2018-06-26 | Stop reason: HOSPADM

## 2018-06-22 RX ORDER — NICOTINE 21 MG/24HR
1 PATCH, TRANSDERMAL 24 HOURS TRANSDERMAL DAILY
Status: DISCONTINUED | OUTPATIENT
Start: 2018-06-22 | End: 2018-06-26 | Stop reason: HOSPADM

## 2018-06-22 RX ORDER — PANTOPRAZOLE SODIUM 40 MG/1
40 TABLET, DELAYED RELEASE ORAL 2 TIMES DAILY
Status: DISCONTINUED | OUTPATIENT
Start: 2018-06-22 | End: 2018-06-26 | Stop reason: HOSPADM

## 2018-06-22 RX ORDER — PROMETHAZINE HYDROCHLORIDE 25 MG/ML
12.5 INJECTION, SOLUTION INTRAMUSCULAR; INTRAVENOUS EVERY 6 HOURS PRN
Status: DISCONTINUED | OUTPATIENT
Start: 2018-06-22 | End: 2018-06-26 | Stop reason: HOSPADM

## 2018-06-22 RX ORDER — TRAZODONE HYDROCHLORIDE 50 MG/1
50 TABLET ORAL
Status: DISCONTINUED | OUTPATIENT
Start: 2018-06-22 | End: 2018-06-26 | Stop reason: HOSPADM

## 2018-06-22 RX ORDER — OXYCODONE HYDROCHLORIDE 5 MG/1
5 TABLET ORAL 2 TIMES DAILY PRN
Qty: 14 TABLET | Refills: 0 | Status: SHIPPED | OUTPATIENT
Start: 2018-06-22 | End: 2018-06-26 | Stop reason: HOSPADM

## 2018-06-22 RX ORDER — ONDANSETRON 2 MG/ML
4 INJECTION INTRAMUSCULAR; INTRAVENOUS EVERY 6 HOURS PRN
Status: DISCONTINUED | OUTPATIENT
Start: 2018-06-22 | End: 2018-06-26 | Stop reason: HOSPADM

## 2018-06-22 RX ORDER — ATORVASTATIN CALCIUM 40 MG/1
40 TABLET, FILM COATED ORAL
Status: DISCONTINUED | OUTPATIENT
Start: 2018-06-22 | End: 2018-06-26 | Stop reason: HOSPADM

## 2018-06-22 RX ORDER — SODIUM CHLORIDE, SODIUM LACTATE, POTASSIUM CHLORIDE, CALCIUM CHLORIDE 600; 310; 30; 20 MG/100ML; MG/100ML; MG/100ML; MG/100ML
200 INJECTION, SOLUTION INTRAVENOUS CONTINUOUS
Status: DISCONTINUED | OUTPATIENT
Start: 2018-06-22 | End: 2018-06-23

## 2018-06-22 RX ORDER — SODIUM CHLORIDE 9 MG/ML
125 INJECTION, SOLUTION INTRAVENOUS CONTINUOUS
Status: DISCONTINUED | OUTPATIENT
Start: 2018-06-22 | End: 2018-06-22

## 2018-06-22 RX ADMIN — HYDROMORPHONE HYDROCHLORIDE 0.5 MG: 1 INJECTION, SOLUTION INTRAMUSCULAR; INTRAVENOUS; SUBCUTANEOUS at 08:09

## 2018-06-22 RX ADMIN — IOHEXOL 100 ML: 350 INJECTION, SOLUTION INTRAVENOUS at 00:57

## 2018-06-22 RX ADMIN — PANCRELIPASE 36000 UNITS: 24000; 76000; 120000 CAPSULE, DELAYED RELEASE PELLETS ORAL at 11:33

## 2018-06-22 RX ADMIN — ENOXAPARIN SODIUM 40 MG: 100 INJECTION SUBCUTANEOUS at 09:20

## 2018-06-22 RX ADMIN — Medication 1000 MG: at 09:20

## 2018-06-22 RX ADMIN — PROMETHAZINE HYDROCHLORIDE 12.5 MG: 25 INJECTION INTRAMUSCULAR; INTRAVENOUS at 16:01

## 2018-06-22 RX ADMIN — FAMOTIDINE 20 MG: 20 TABLET ORAL at 17:03

## 2018-06-22 RX ADMIN — FAMOTIDINE 20 MG: 20 TABLET ORAL at 09:20

## 2018-06-22 RX ADMIN — TRAZODONE HYDROCHLORIDE 50 MG: 50 TABLET ORAL at 21:42

## 2018-06-22 RX ADMIN — ATORVASTATIN CALCIUM 40 MG: 40 TABLET, FILM COATED ORAL at 16:01

## 2018-06-22 RX ADMIN — SODIUM CHLORIDE, SODIUM LACTATE, POTASSIUM CHLORIDE, AND CALCIUM CHLORIDE 200 ML/HR: .6; .31; .03; .02 INJECTION, SOLUTION INTRAVENOUS at 20:08

## 2018-06-22 RX ADMIN — HYDROMORPHONE HYDROCHLORIDE 1 MG: 1 INJECTION, SOLUTION INTRAMUSCULAR; INTRAVENOUS; SUBCUTANEOUS at 02:02

## 2018-06-22 RX ADMIN — HYDROMORPHONE HYDROCHLORIDE 1 MG: 1 INJECTION, SOLUTION INTRAMUSCULAR; INTRAVENOUS; SUBCUTANEOUS at 10:59

## 2018-06-22 RX ADMIN — HYDROMORPHONE HYDROCHLORIDE 1 MG: 1 INJECTION, SOLUTION INTRAMUSCULAR; INTRAVENOUS; SUBCUTANEOUS at 17:03

## 2018-06-22 RX ADMIN — SODIUM CHLORIDE 125 ML/HR: 0.9 INJECTION, SOLUTION INTRAVENOUS at 05:15

## 2018-06-22 RX ADMIN — HYDROMORPHONE HYDROCHLORIDE 1 MG: 1 INJECTION, SOLUTION INTRAMUSCULAR; INTRAVENOUS; SUBCUTANEOUS at 23:09

## 2018-06-22 RX ADMIN — PANCRELIPASE 36000 UNITS: 24000; 76000; 120000 CAPSULE, DELAYED RELEASE PELLETS ORAL at 09:17

## 2018-06-22 RX ADMIN — HYDROMORPHONE HYDROCHLORIDE 0.5 MG: 1 INJECTION, SOLUTION INTRAMUSCULAR; INTRAVENOUS; SUBCUTANEOUS at 05:37

## 2018-06-22 RX ADMIN — HYDROMORPHONE HYDROCHLORIDE 0.5 MG: 1 INJECTION, SOLUTION INTRAMUSCULAR; INTRAVENOUS; SUBCUTANEOUS at 11:40

## 2018-06-22 RX ADMIN — HYDROMORPHONE HYDROCHLORIDE 0.5 MG: 1 INJECTION, SOLUTION INTRAMUSCULAR; INTRAVENOUS; SUBCUTANEOUS at 03:00

## 2018-06-22 RX ADMIN — SODIUM CHLORIDE, SODIUM LACTATE, POTASSIUM CHLORIDE, AND CALCIUM CHLORIDE 200 ML/HR: .6; .31; .03; .02 INJECTION, SOLUTION INTRAVENOUS at 13:54

## 2018-06-22 RX ADMIN — HYDROMORPHONE HYDROCHLORIDE 0.5 MG: 1 INJECTION, SOLUTION INTRAMUSCULAR; INTRAVENOUS; SUBCUTANEOUS at 05:08

## 2018-06-22 RX ADMIN — ONDANSETRON 4 MG: 2 INJECTION INTRAMUSCULAR; INTRAVENOUS at 12:18

## 2018-06-22 RX ADMIN — ONDANSETRON 4 MG: 2 INJECTION INTRAMUSCULAR; INTRAVENOUS at 20:08

## 2018-06-22 RX ADMIN — HYDROMORPHONE HYDROCHLORIDE 1 MG: 1 INJECTION, SOLUTION INTRAMUSCULAR; INTRAVENOUS; SUBCUTANEOUS at 20:08

## 2018-06-22 RX ADMIN — NICOTINE 1 PATCH: 21 PATCH, EXTENDED RELEASE TRANSDERMAL at 09:21

## 2018-06-22 RX ADMIN — PANTOPRAZOLE SODIUM 40 MG: 40 TABLET, DELAYED RELEASE ORAL at 16:01

## 2018-06-22 RX ADMIN — HYDROMORPHONE HYDROCHLORIDE 1 MG: 1 INJECTION, SOLUTION INTRAMUSCULAR; INTRAVENOUS; SUBCUTANEOUS at 00:19

## 2018-06-22 RX ADMIN — HYDROMORPHONE HYDROCHLORIDE 1 MG: 1 INJECTION, SOLUTION INTRAMUSCULAR; INTRAVENOUS; SUBCUTANEOUS at 13:55

## 2018-06-22 NOTE — CASE MANAGEMENT
Thank you,  [address]  [facility] in the [facility] by [employer] for 2017  Network Utilization Review Department  Phone: [phone]; Fax [phone]  ATTENTION: The Network Utilization Review Department is now centralized for our 7 Facilities  Make a note that we have a new phone and fax numbers for our Department  Please call with any questions or concerns to [phone] and carefully follow the prompts so that you are directed to the right person  All voicemails are confidential  Fax any determinations, approvals, denials, and requests for initial or continue stay review clinical to [phone]  Due to HIGH CALL volume, it would be easier if you could please send faxed requests to expedite your requests and in part, help us provide discharge notifications faster  Initial Clinical Review    Admission: Date/Time/Statement: INSHREYA 6/22/18 @ 0213     Orders Placed This Encounter   Procedures    Inpatient Admission (expected length of stay for this patient is greater than two midnights)     Standing Status:   Standing     Number of Occurrences:   1     Order Specific Question:   Admitting Physician     Answer:   Cammy Lipoma [[id number]]     Order Specific Question:   Level of Care     Answer:   Med Surg [[id number]]     Order Specific Question:   Estimated length of stay     Answer:   More than 2 Midnights     Order Specific Question:   Certification     Answer:   I certify that inpatient services are medically necessary for this patient for a duration of greater than two midnights  See H&P and MD Progress Notes for additional information about the patient's course of treatment           ED: Date/Time/Mode of Arrival:   ED Arrival Information     Expected Arrival Acuity Means of Arrival Escorted By Service Admission Type    - 6/21/2018 21:03 Urgent Wheelchair Spouse General Medicine Urgent    Arrival Complaint    ABDOMINAL PAIN           Chief Complaint:   Chief Complaint Patient presents with    Abdominal Pain     Pt suffers from chronic pancreatitis and is c/o pain that goes across abdominal, receives nerve blocker injections for pain  began vomiting 20 minutes ago       History of Illness: Craig Oden is a [address] y o  male who presents with complaints of abdominal pain that began at noon yesterday  Patient states he is nauseated and vomited several times  Patient's last time he vomited was when he was in the parking coming into the emergency room  Patient has history of pancreatitis      ED Vital Signs:   ED Triage Vitals   Temperature Pulse Respirations Blood Pressure SpO2   06/21/18 2257 06/21/18 2109 06/21/18 2109 06/21/18 2109 06/21/18 2109   98 4 °F (36 9 °C) 100 18 (!) 159/113 99 %      Temp Source Heart Rate Source Patient Position - Orthostatic VS BP Location FiO2 (%)   06/21/18 2109 06/21/18 2109 06/21/18 2304 06/21/18 2304 --   Oral Monitor Lying Right arm       Pain Score       06/21/18 2109       9        Wt Readings from Last 1 Encounters:   06/21/18 88 5 kg (195 lb)       Vital Signs (abnormal):   06/22/18 0230  --  73  18   183/103  92 %  --  --   06/22/18 0200  --  88  20   178/112  96 %  None (Room air)  Lying   06/22/18 0115  --  83  21   198/113  95 %  --  --   06/21/18 2304  --  82  18   155/103  96 %  None (Room air)  Lying   06/21/18 2257  98 4 °F (36 9 °C)  --  --  --  --  --  --   06/21/18 2109  --  100  18   159/113  99 %  --  --         Abnormal Labs/Diagnostic Test Results:   Lab Units 06/21/18  2208   WBC Thousand/uL 13 89*   HEMOGLOBIN g/dL 15 2   HEMATOCRIT % 44 2   PLATELETS Thousands/uL 236   NEUTROS PCT % 76*   LYMPHS PCT % 15   MONOS PCT % 7   EOS PCT % 1         Results from last 7 days  Lab Units 06/21/18  2208   SODIUM mmol/L 135*   POTASSIUM mmol/L 3 3*   CHLORIDE mmol/L 101   CO2 mmol/L 25   BUN mg/dL 19   CREATININE mg/dL 0 90   CALCIUM mg/dL 8 9   TOTAL PROTEIN g/dL 7 0   BILIRUBIN TOTAL mg/dL 0 50   ALK PHOS U/L 69   ALT U/L 31   AST U/L 20   GLUCOSE RANDOM mg/dL 125         Results from last 7 days  Lab Units 06/21/18  2208   INR   0 99        LIPASE 611    TOTAL BILI 1 20         Imaging: I have personally reviewed pertinent reports  CT abdomen pelvis with contrast -  VRAD read -   Evidence of pancreatitis  No evidence of necrosis    CT abdomen pelvis with contrast    (Results Pending)         EKG, Pathology, and Other Studies Reviewed on Admission:   · EKG:  NSR       ED Treatment:   Medication Administration from 06/21/2018 2103 to 06/22/2018 1009       Date/Time Order Dose Route Action Action by Comments     06/21/2018 2234 HYDROmorphone (DILAUDID) injection 1 mg 1 mg Intravenous Given Durga More RN      06/21/2018 2234 ondansetron (ZOFRAN) injection 4 mg 4 mg Intravenous Given Durga More RN      06/22/2018 0006 sodium chloride 0 9 % bolus 1,000 mL 0 mL Intravenous Stopped Carey Cannon RN      06/21/2018 2237 sodium chloride 0 9 % bolus 1,000 mL 1,000 mL Intravenous New Bag Durga More RN      06/21/2018 2304 HYDROmorphone (DILAUDID) injection 1 mg 1 mg Intravenous Given Durga More RN      06/22/2018 0019 HYDROmorphone (DILAUDID) injection 1 mg 1 mg Intravenous Given Carey Cannon RN      06/22/2018 0057 iohexol (OMNIPAQUE) 350 MG/ML injection (MULTI-DOSE) 100 mL 100 mL Intravenous Given Mercedes Gallego      06/22/2018 0202 HYDROmorphone (DILAUDID) injection 1 mg 1 mg Intravenous Given Zachariah Reading      06/22/2018 0920 fish oil capsule 1,000 mg 1,000 mg Oral Given Rita Pelrain Stockyobany      06/22/2018 0917 pancrelipase (Lip-Prot-Amyl) (CREON) delayed release capsule 36,000 Units 36,000 Units Oral Given Rita Pelrain [facility]      06/22/2018 0607 pantoprazole (PROTONIX) EC tablet 40 mg 0 mg Oral Hold Carey Cannon RN Pt is NPO     06/22/2018 0920 famotidine (PEPCID) tablet 20 mg 20 mg Oral Given Rita Pelrain Stockyobany      06/22/2018 0515 sodium chloride 0 9 % infusion 125 mL/hr Intravenous New Bag Carey Cannon RN 06/22/2018 0921 nicotine (NICODERM CQ) 21 mg/24 hr TD 24 hr patch 1 patch 1 patch Transdermal Medication Applied Amber Mayorga      06/22/2018 0920 enoxaparin (LOVENOX) subcutaneous injection 40 mg 40 mg Subcutaneous Given Esthela Blountyobany      06/22/2018 0809 HYDROmorphone (DILAUDID) injection 0 5 mg 0 5 mg Intravenous Given Angelita Olivia      06/22/2018 5257 HYDROmorphone (DILAUDID) injection 0 5 mg 0 5 mg Intravenous Given Joceline Ramsey RN      06/22/2018 0300 HYDROmorphone (DILAUDID) injection 0 5 mg 0 5 mg Intravenous Given Joceline Ramsey RN      06/22/2018 6563 HYDROmorphone (DILAUDID) injection 0 5 mg 0 5 mg Intravenous Given Joceline Ramsey RN           Past Medical/Surgical History:    Active Ambulatory Problems     Diagnosis Date Noted    Pancreatic lesion 02/14/2017    Renal mass 03/02/2017    Smoker 03/02/2017    RBBB 11/16/2017    Epigastric pain 11/15/2017    Acute gastritis without hemorrhage 11/15/2017    Abdominal pain 04/18/2018    Elevated liver enzymes 04/18/2018    Chronic pancreatitis ([facility] [address] ) 04/18/2018    Leukopenia 04/18/2018    Hypertriglyceridemia 04/19/2018    Chronic pain syndrome 05/02/2018    Esophagitis 05/08/2018    Other chronic pancreatitis ([facility] [address] ) 05/11/2018    Chronic gastritis without bleeding 05/22/2018     Resolved Ambulatory Problems     Diagnosis Date Noted    Leukocytosis 11/16/2017    Positive D dimer 11/16/2017    Chest pain 11/16/2017    Epigastric abdominal pain 11/16/2017    HLD (hyperlipidemia) 11/16/2017    Ileus ([facility] [address] ) 05/08/2018     Past Medical History:   Diagnosis Date    Asthma     Chronic pain disorder     GERD (gastroesophageal reflux disease)     Hyperlipidemia     Liver abscess     Meniscus tear     Pancreatitis     Pneumonia        Admitting Diagnosis: Abdominal pain [R10 9]    Age/Sex: 37 y o  male    Assessment/Plan:   * Chronic pancreatitis ([facility] [address] )   Assessment & Plan     Admit  IV fluids  GI consult  Pain control        Chronic gastritis without bleeding   Assessment & Plan     Continue Protonix and Pepcid             VTE Prophylaxis: Enoxaparin (Lovenox)  / sequential compression device   Code Status:  Full  POLST: There is no POLST form on file for this patient (pre-hospital)  Discussion with family:  There is no family present for discussion      Anticipated Length of Stay:  Patient will be admitted on an Inpatient basis with an anticipated length of stay of  more than 2 midnights     Justification for Hospital Stay:  Pain control, IV fluids         Admission Orders:  INPT  NPO  SEQ COMP DEVICE  OOB  CONSULT GASTROENTEROLOGY    Scheduled Meds:   Current Facility-Administered Medications:  atorvastatin 40 mg Oral Daily With [employer] , PA-C    enoxaparin 40 mg Subcutaneous Daily Ernst Quiver, PA-C    famotidine 20 mg Oral BID Ernst Quiver, PA-C    fish oil 1,000 mg Oral Daily Ernst Quiver, PA-C    HYDROmorphone 0 5 mg Intravenous Q3H PRN Ernst Quiver, PA-C    nicotine 1 patch Transdermal Daily Ernst Quiver, PA-C    ondansetron 4 mg Intravenous Q6H PRN Ernst Quiver, PA-C    pancrelipase (Lip-Prot-Amyl) 36,000 Units Oral TID AC Ernst Quiver, PA-C    pantoprazole 40 mg Oral BID Ernst Quiver, PA-C    sodium chloride 125 mL/hr Intravenous Continuous Ernst Quiver, PA-C Last Rate: 125 mL/hr (06/22/18 0515)   traZODone 50 mg Oral HS Ernst Quiver, PA-C      Facility-Administered Medications Ordered in Other Encounters:  oxyCODONE-acetaminophen 2 tablet Oral Once Shanta Lopez MD     Continuous Infusions:   sodium chloride 125 mL/hr Last Rate: 125 mL/hr (06/22/18 0515)     PRN Meds: HYDROmorphone    ondansetron

## 2018-06-22 NOTE — ED NOTES
Attempted ultrasound guided IV start above right antecubital, arterial access gained  Catheter removed, pressure held x 5 minutes, pressure dressing applied       Orrin Castleman, RN  06/21/18 3407

## 2018-06-22 NOTE — ED NOTES
Pt c/o increased abdominal pain, nausea, and rigid abdomin  Spoke with dr Bigg Huston, dr Bigg Huston to reevaluate pt        Sydnee Mayorga  06/22/18 4672

## 2018-06-22 NOTE — TELEPHONE ENCOUNTER
I can send over a short term prescription for oxycodone 5mg po BID X 7 days supply  electronically and he should make an appointment to see me next week  Thank you

## 2018-06-22 NOTE — ED PROVIDER NOTES
History  Chief Complaint   Patient presents with    Abdominal Pain     Pt suffers from chronic pancreatitis and is c/o pain that goes across abdominal, receives nerve blocker injections for pain  began vomiting 20 minutes ago     HPI patient is a 57-year-old male, history of chronic pancreatitis, patient reports he developed abdominal pain through the day, describes an epigastric pain which is a soreness, somewhat sharp at times radiating towards his back consistent with his chronic pancreatitis  Patient reports he has been trying to tolerate the pain all day and then 20 minutes prior to arrival started with vomiting  Patient complains of a soreness uncomfortable feeling in his upper abdomen again with radiation towards his spine  Patient reports this is the pain he receives with his chronic pancreatitis  Patient reports recent nerve block to try and relieve the pain have been unsuccessful  Past medical history of chronic pancreatitis, gastritis  Family history noncontributory  Social history smoker, denies drug abuse    Prior to Admission Medications   Prescriptions Last Dose Informant Patient Reported? Taking? Choline Fenofibrate (FENOFIBRIC ACID) 135 MG CPDR  Self Yes No   Sig: Take 1 capsule by mouth daily   EPINEPHrine (EPIPEN 2-ARIEL) 0 3 mg/0 3 mL SOAJ  Self Yes No   Sig: EpiPen 2-Ariel 0 3 MG/0 3ML Injection Solution Auto-injector  INJECT 0 3ML INTRAMUSCULARLY AS DIRECTED     Quantity: 1;  Refills: 1      Toro Briceño ; Active  2 Solution Auto-injector Pen   FLAXSEED, LINSEED, PO  Self Yes No   Sig: Take 1,000 mg by mouth 3 (three) times a day   Pancrelipase, Lip-Prot-Amyl, (CREON) 56458 units CPEP  Self No No   Sig: Take 1 capsule (36,000 Units total) by mouth 3 (three) times a day before meals   acetaminophen (TYLENOL) 325 mg tablet   No No   Sig: Take 2 tablets (650 mg total) by mouth every 6 (six) hours as needed for fever   albuterol (PROVENTIL HFA,VENTOLIN HFA) 90 mcg/act inhaler  Self Yes No   Sig: Inhale 1-2 puffs every 4 (four) hours as needed for wheezing     cyclobenzaprine (FLEXERIL) 10 mg tablet   No No   Sig: Take 1 tablet (10 mg total) by mouth 3 (three) times a day as needed for muscle spasms for up to 30 days   docusate sodium (COLACE) 100 mg capsule   No No   Sig: Take 1 capsule (100 mg total) by mouth 2 (two) times a day as needed for constipation   niacin (NIASPAN) 500 mg CR tablet   No No   Sig: Take 1 tablet (500 mg total) by mouth daily with breakfast for 90 days   omega-3-acid ethyl esters (LOVAZA) 1 g capsule  Self No No   Sig: Take 2 capsules (2 g total) by mouth 2 (two) times a day for 90 days   ondansetron (ZOFRAN-ODT) 4 mg disintegrating tablet   No No   Sig: Take 1 tablet (4 mg total) by mouth every 8 (eight) hours as needed for nausea or vomiting   oxyCODONE (ROXICODONE) 5 mg immediate release tablet   No No   Sig: Take 1 tablet (5 mg total) by mouth 2 (two) times a day as needed for moderate pain for up to 7 days Max Daily Amount: 10 mg   pancrelipase, Lip-Prot-Amyl, (CREON) 12,000 units capsule  Self Yes No   Sig: Take 12,000 units of lipase by mouth 3 (three) times a day with meals     pantoprazole (PROTONIX) 40 mg tablet   No No   Sig: Take 1 tablet (40 mg total) by mouth 2 (two) times a day for 90 days   ranitidine (ZANTAC) 150 MG capsule   Yes No   Sig: Take 150 mg by mouth 2 (two) times a day   rosuvastatin (CRESTOR) 20 MG tablet  Self No No   Sig: Take 1 tablet (20 mg total) by mouth daily   Patient taking differently: Take by mouth daily     sucralfate (CARAFATE) 1 g tablet   No No   Sig: Take 1 tablet (1 g total) by mouth 4 (four) times a day for 7 days   traZODone (DESYREL) 50 mg tablet   No No   Sig: Take 1 tablet (50 mg total) by mouth daily at bedtime      Facility-Administered Medications: None       Past Medical History:   Diagnosis Date    Asthma     Chronic pain disorder     GERD (gastroesophageal reflux disease)     Hyperlipidemia     Liver abscess     Meniscus tear     left knee work injury last assessed 08/24/2016    Pancreatitis     Pneumonia        Past Surgical History:   Procedure Laterality Date    CHOLECYSTECTOMY      ESOPHAGOGASTRODUODENOSCOPY N/A 11/16/2017    Procedure: ESOPHAGOGASTRODUODENOSCOPY (EGD); Surgeon: Tere Brenner MD;  [facility]; Service: Gastroenterology    ESOPHAGOGASTRODUODENOSCOPY N/A 4/19/2018    Procedure: ESOPHAGOGASTRODUODENOSCOPY (EGD); Surgeon: Julian Ge MD;  [facility]; Service: Gastroenterology    ESOPHAGOGASTRODUODENOSCOPY N/A 5/10/2018    Procedure: ESOPHAGOGASTRODUODENOSCOPY (EGD); Surgeon: Ileana Owens MD;  [facility];   Service: Gastroenterology    KNEE ARTHROSCOPY Bilateral     KNEE ARTHROSCOPY Right 2009    cibishino last assessed 08/24/2016   [place] [facility]      NERVE BLOCK Bilateral 5/29/2018    Procedure: SPLANCHNIC NERVE BLOCK;  Surgeon: Armando Bee MD;  [facility];  Service: Pain Management     PANCREAS SURGERY      stents    MT INJECT NERV [address] Bilateral 5/9/2017    Procedure: CELIAC PLEXUS BLOCK ;  Surgeon: Armando Bee MD;  [facility];  Service: Pain Management     MT INJECT NERV BLCK,CELIAC PLEXUS Bilateral 6/1/2017    Procedure: SPLANCHNIC NERVE BLOCK at T12;  Surgeon: Armando Bee MD;  [facility];  Service: Pain Management     MT INJECT NERV BLCK,CELIAC PLEXUS Bilateral 8/8/2017    Procedure: BILATERAL SPLANCHNIC NERVE BLOCK T12;  Surgeon: Armando Bee MD;  [facility];  Service: Pain Management     MT INJECT NERV [address] Bilateral 12/28/2017    Procedure: SPLANCHNIC NERVE BLOCK;  Surgeon: Armando Bee MD;  [facility];  Service: Pain Management     MT LAP,CHOLECYSTECTOMY N/A 2/14/2017    Procedure: LAPAROSCOPIC CHOLECYSTECTOMY, IOC, POSSIBLE OPEN ;  Surgeon: Lanre Miller MD;  [facility];  Service: General    ROTATOR CUFF REPAIR Right     SHOULDER ARTHROSCOPY      SHOULDER ARTHROSCOPY Right        Family History   Problem Relation Age of Onset    Cirrhosis Mother     Heart disease Other         cardiac disorder    Cancer Other      I have reviewed and agree with the history as documented  Social History   Substance Use Topics    Smoking status: Current Every Day Smoker     Packs/day: 0 50     Years: 20 00    Smokeless tobacco: Never Used    Alcohol use Yes      Comment: rarely, occasionally, history of no alcohol use per allscripts        Review of Systems   Constitutional: Negative for diaphoresis, fatigue and fever  HENT: Negative for congestion, ear pain, nosebleeds and sore throat  Eyes: Negative for photophobia, pain, discharge and visual disturbance  Respiratory: Negative for cough, choking, chest tightness, shortness of breath and wheezing  Cardiovascular: Negative for chest pain and palpitations  Gastrointestinal: Positive for abdominal pain, nausea and vomiting  Negative for abdominal distention and diarrhea  Genitourinary: Negative for dysuria, flank pain and frequency  Musculoskeletal: Negative for back pain, gait problem and joint swelling  Skin: Negative for color change and rash  Neurological: Negative for dizziness, syncope and headaches  Psychiatric/Behavioral: Negative for behavioral problems and confusion  The patient is not nervous/anxious  All other systems reviewed and are negative  Physical Exam  Physical Exam   Constitutional: He is oriented to person, place, and time  He appears well-developed and well-nourished  HENT:   Head: Normocephalic  Right Ear: External ear normal    Left Ear: External ear normal    Nose: Nose normal    Mouth/Throat: Oropharynx is clear and moist    Eyes: EOM and lids are normal  Pupils are equal, round, and reactive to light  Neck: Normal range of motion  Neck supple  Cardiovascular: Normal rate, regular rhythm and normal heart sounds      Pulmonary/Chest: Effort normal and breath sounds normal  No respiratory distress  Abdominal: Soft  Bowel sounds are normal  He exhibits no distension and no mass  There is tenderness  There is no rebound and no guarding  No hernia  There is epigastric tenderness without rebound or guarding   Musculoskeletal: Normal range of motion  He exhibits no deformity  Neurological: He is alert and oriented to person, place, and time  Skin: Skin is warm and dry  Psychiatric: He has a normal mood and affect  Nursing note and vitals reviewed     Pulse oximetry is 92% on room air adequate oxygenation, there is no hypoxia    Vital Signs  ED Triage Vitals   Temperature Pulse Respirations Blood Pressure SpO2   06/21/18 2257 06/21/18 2109 06/21/18 2109 06/21/18 2109 06/21/18 2109   98 4 °F (36 9 °C) 100 18 (!) 159/113 99 %      Temp Source Heart Rate Source Patient Position - Orthostatic VS BP Location FiO2 (%)   06/21/18 2109 06/21/18 2109 06/21/18 2304 06/21/18 2304 --   Oral Monitor Lying Right arm       Pain Score       06/21/18 2109       9           Vitals:    06/21/18 2304 06/22/18 0115 06/22/18 0200 06/22/18 0230   BP: (!) 155/103 (!) 198/113 (!) 178/112 (!) 183/103   Pulse: 82 83 88 73   Patient Position - Orthostatic VS: Lying  Lying        Visual Acuity      ED Medications  Medications   fish oil capsule 1,000 mg (not administered)   pancrelipase (Lip-Prot-Amyl) (CREON) delayed release capsule 36,000 Units (not administered)   pantoprazole (PROTONIX) EC tablet 40 mg (not administered)   famotidine (PEPCID) tablet 20 mg (not administered)   atorvastatin (LIPITOR) tablet 40 mg (not administered)   traZODone (DESYREL) tablet 50 mg (not administered)   ondansetron (ZOFRAN) injection 4 mg (not administered)   sodium chloride 0 9 % infusion (not administered)   nicotine (NICODERM CQ) 21 mg/24 hr TD 24 hr patch 1 patch (not administered)   enoxaparin (LOVENOX) subcutaneous injection 40 mg (not administered)   HYDROmorphone (DILAUDID) injection 0 5 mg (0 5 mg Intravenous Given 6/22/18 0300)   HYDROmorphone (DILAUDID) injection 1 mg (1 mg Intravenous Given 6/21/18 2234)   ondansetron (ZOFRAN) injection 4 mg (4 mg Intravenous Given 6/21/18 2234)   sodium chloride 0 9 % bolus 1,000 mL (0 mL Intravenous Stopped 6/22/18 0006)   HYDROmorphone (DILAUDID) injection 1 mg (1 mg Intravenous Given 6/21/18 2304)   HYDROmorphone (DILAUDID) injection 1 mg (1 mg Intravenous Given 6/22/18 0019)   iohexol (OMNIPAQUE) 350 MG/ML injection (MULTI-DOSE) 100 mL (100 mL Intravenous Given 6/22/18 0057)   HYDROmorphone (DILAUDID) injection 1 mg (1 mg Intravenous Given 6/22/18 0202)       Diagnostic Studies  Results Reviewed     Procedure Component Value Units Date/Time    Platelet count [[id number]]     Lab Status:  No result Specimen:  Blood     Hepatic function panel [[id number]]  (Normal) Collected:  06/21/18 2208    Lab Status:  Final result Specimen:  Blood from Arm, Right Updated:  06/21/18 2245     Total Bilirubin 0 50 mg/dL      Bilirubin, Direct 0 07 mg/dL      Alkaline Phosphatase 69 U/L      AST 20 U/L      ALT 31 U/L      Total Protein 7 0 g/dL      Albumin 4 0 g/dL     Troponin I [[id number]]  (Normal) Collected:  06/21/18 2208    Lab Status:  Final result Specimen:  Blood from Arm, Right Updated:  06/21/18 2245     Troponin I <0 02 ng/mL     Basic metabolic panel [[id number]]  (Abnormal) Collected:  06/21/18 2208    Lab Status:  Final result Specimen:  Blood from Arm, Right Updated:  06/21/18 2244     Sodium 135 (L) mmol/L      Potassium 3 3 (L) mmol/L      Chloride 101 mmol/L      CO2 25 mmol/L      Anion Gap 9 mmol/L      BUN 19 mg/dL      Creatinine 0 90 mg/dL      Glucose 125 mg/dL      Calcium 8 9 mg/dL      eGFR 103 ml/min/1 73sq m     Narrative:         National Kidney Disease Education Program recommendations are as follows:  GFR calculation is accurate only with a steady state creatinine  Chronic Kidney disease less than 60 ml/min/1 73 sq  meters  Kidney failure less than 15 ml/min/1 73 sq  meters  Lipase [[id number]]  (Abnormal) Collected:  06/21/18 2208    Lab Status:  Final result Specimen:  Blood from Arm, Right Updated:  06/21/18 2244     Lipase 611 (H) u/L     Protime-INR [[id number]]  (Normal) Collected:  06/21/18 2208    Lab Status:  Final result Specimen:  Blood from Arm, Right Updated:  06/21/18 2235     Protime 13 0 seconds      INR 0 99    CBC and differential [[id number]]  (Abnormal) Collected:  06/21/18 2208    Lab Status:  Final result Specimen:  Blood from Arm, Right Updated:  06/21/18 2223     WBC 13 89 (H) Thousand/uL      RBC 4 80 Million/uL      Hemoglobin 15 2 g/dL      Hematocrit 44 2 %      MCV 92 fL      MCH 31 7 pg      MCHC 34 4 g/dL      RDW 12 7 %      MPV 9 9 fL      Platelets 756 Thousands/uL      nRBC 0 /100 WBCs      Neutrophils Relative 76 (H) %      Immat GRANS % 1 %      Lymphocytes Relative 15 %      Monocytes Relative 7 %      Eosinophils Relative 1 %      Basophils Relative 0 %      Neutrophils Absolute 10 50 (H) Thousands/µL      Immature Grans Absolute 0 09 Thousand/uL      Lymphocytes Absolute 2 05 Thousands/µL      Monocytes Absolute 1 00 Thousand/µL      Eosinophils Absolute 0 20 Thousand/µL      Basophils Absolute 0 05 Thousands/µL                  CT abdomen pelvis with contrast    (Results Pending)              Procedures  ECG 12 Lead Documentation  Date/Time: 6/21/2018 10:41 PM  Performed by: Isamar Blanchard  Authorized by: Isamar Blanchard     Indications / Diagnosis:  Epigastric pain  ECG reviewed by me, the ED Provider: yes    Patient location:  ED  Previous ECG:     Previous ECG:  Compared to current    Comparison ECG info:   May 8, 2018    Similarity:  No change  Interpretation:     Interpretation: normal    Rate:     ECG rate:  Eighty-five    ECG rate assessment: normal    Rhythm:     Rhythm: sinus rhythm    Ectopy:     Ectopy: none    QRS:     QRS axis:  Normal  Conduction:     Conduction: normal    ST segments:     ST segments: Non-specific  Comments:      Normal sinus rhythm no acute ST-T wave changes, no ST elevations  Phone Contacts  ED Phone Contact    ED Course        diagnostic testing showed normal liver functions no sign hepatitis, patient's electrolytes were normal no sign of renal dysfunction, lipase was elevated at 611 consistent with pancreatitis  Patient's INR was normal no sign of severe coagulopathy  white count was elevated at 13 8, consistent with inflammation, hemoglobin was normal at 15 no sign of anemia  CT scan abdomen pelvis read by virtual Radiology showed chronic pancreatitis  Patient persisted with abdominal pain required multiple 4-5 injections of Dilaudid IV pain meds to control his pain  Discussed with the patient will require admission  Discussed with the hospitalist service  MDM medical decision making 53-year-old male with recurrent pancreatitis, presents with elevated lipase and epigastric pain, CT consistent with pancreatitis, discussed with the hospitalist service will require admission, recurrent pancreatitis with intractable pain  CritCare Time    Disposition  Final diagnoses:   Pancreatitis   Intractable pain     Time reflects when diagnosis was documented in both MDM as applicable and the Disposition within this note     Time User Action Codes Description Comment    6/22/2018  1:29 AM Brittani Guadalupe [K85 [id number]] Pancreatitis     6/22/2018  1:29 AM Brittani Guadalupe [[id number]] Intractable pain       ED Disposition     ED Disposition Condition Comment    Admit  Case was discussed with the hospitalist service and the patient's admission status was agreed to be 2 midnights the service of Dr Radha Gonzalez    None         Patient's Medications   Discharge Prescriptions    No medications on file     No discharge procedures on file      ED Provider  Electronically Signed by           Terry Bañuelos MD  06/22/18 7665

## 2018-06-22 NOTE — SOCIAL WORK
Not a readmission  LACE 57  Cm met w pt and his wife  Pt has support at home  No POA or advanced directive and he denied need for resources  Resides in [facility]  ind w adls  No dme  Drives  Employed  No hx VNA  Uses [facility] [place] [facility]  [employer]  Has ride home at d c no other reported needs at this time    CM reviewed discharge planning process including the following: identifying help at home, patient preference for discharge planning needs, pharmacy preference, and availability of treatment team to discuss questions or concerns patient and/or family may have regarding understanding medications and recognizing signs and symptoms once discharged  CM also encouraged patient to follow up with all recommended appointments after discharge  Patient advised of importance for patient and family to participate in managing patients medical well being  CM name and role reviewed and Discharge Checklist provided  Encouraged patient and caregiver to review prior to discharge

## 2018-06-22 NOTE — TELEPHONE ENCOUNTER
Pt wife called stating he is currently in the ED since yesterday  Pt is in a lot of pain and the nerve block that was done was unsuccessful  Pt wife wants to know if Dr Javon Walker can prescribe some kind of pain medication for pt   Pt wife Keyana Dose can be reached at [phone]

## 2018-06-22 NOTE — TELEPHONE ENCOUNTER
BOZENA    S/w wife  Pt is being admitted  Advised her to f/u with [facility] when pt is being discharged for further instructions

## 2018-06-22 NOTE — ED NOTES
Dr Klarissa Johnson called  Pt c/o pain 10/10 requesting additional pain medication        David Mayorga  06/22/18 6838

## 2018-06-22 NOTE — ED NOTES
Pt c/o of nausea, spoke with dr Adolfo Perez, awaiting orders       [facility]  06/22/18 1652

## 2018-06-22 NOTE — PLAN OF CARE
Problem: DISCHARGE PLANNING - CARE MANAGEMENT  Goal: Discharge to post-acute care or home with appropriate resources  INTERVENTIONS:  - Conduct assessment to determine patient/family and health care team treatment goals, and need for post-acute services based on payer coverage, community resources, and patient preferences, and barriers to discharge  - Address psychosocial, clinical, and financial barriers to discharge as identified in assessment in conjunction with the patient/family and health care team  - Arrange appropriate level of post-acute services according to patients   needs and preference and payer coverage in collaboration with the physician and health care team  - Communicate with and update the patient/family, physician, and health care team regarding progress on the discharge plan  - Arrange appropriate transportation to post-acute venues  Outcome: Progressing  Not a readmission  LACE 57  Cm met w pt and his wife  Pt has support at home  No POA or advanced directive and he denied need for resources  Resides in [facility] w adls  No dme  Drives  Employed  No hx VNA  [facility]ses [facility] [place] [facility] [employer] [facility]  [employer]  Has ride home at d c no other reported needs at this time    CM reviewed discharge planning process including the following: identifying help at home, patient preference for discharge planning needs, pharmacy preference, and availability of treatment team to discuss questions or concerns patient and/or family may have regarding understanding medications and recognizing signs and symptoms once discharged  CM also encouraged patient to follow up with all recommended appointments after discharge  Patient advised of importance for patient and family to participate in managing patients medical well being  CM name and role reviewed and Discharge Checklist provided  Encouraged patient and caregiver to review prior to discharge

## 2018-06-22 NOTE — H&P
H&P- Radha Morillo 1975, 37 y o  male MRN: [id number]    Unit/Bed#: ED 13 Encounter: [id number]    Primary Care Provider: Trang Russ MD   Date and time admitted to hospital: 6/21/2018  9:20 PM        * Chronic pancreatitis ([facility] [address] )   Assessment & Plan    Admit  IV fluids  GI consult  Pain control        Chronic gastritis without bleeding   Assessment & Plan    Continue Protonix and Pepcid          VTE Prophylaxis: Enoxaparin (Lovenox)  / sequential compression device   Code Status:  Full  POLST: There is no POLST form on file for this patient (pre-hospital)  Discussion with family:  There is no family present for discussion  Anticipated Length of Stay:  Patient will be admitted on an Inpatient basis with an anticipated length of stay of  more than 2 midnights  Justification for Hospital Stay:  Pain control, IV fluids    Total Time for Visit, including Counseling / Coordination of Care: 30 minutes  Greater than 50% of this total time spent on direct patient counseling and coordination of care  Chief Complaint:   Abdominal pain    History of Present Illness:    Radha Morillo is a 37 y o  male who presents with complaints of abdominal pain that began at noon yesterday  Patient states he is nauseated and vomited several times  Patient's last time he vomited was when he was in the parking coming into the emergency room  Patient denies fever, chills, chest pain, shortness of breath  Patient has history of pancreatitis  Review of Systems:    Review of Systems   Gastrointestinal: Positive for abdominal pain  All other systems reviewed and are negative        Past Medical and Surgical History:     Past Medical History:   Diagnosis Date    Asthma     Chronic pain disorder     GERD (gastroesophageal reflux disease)     Hyperlipidemia     Liver abscess     Meniscus tear     left knee work injury last assessed 08/24/2016    Pancreatitis     Pneumonia        Past Surgical History:   Procedure Laterality Date    CHOLECYSTECTOMY      ESOPHAGOGASTRODUODENOSCOPY N/A 11/16/2017    Procedure: ESOPHAGOGASTRODUODENOSCOPY (EGD); Surgeon: Artur Goel MD;  [facility]; Service: Gastroenterology    ESOPHAGOGASTRODUODENOSCOPY N/A 4/19/2018    Procedure: ESOPHAGOGASTRODUODENOSCOPY (EGD); Surgeon: Zoe Matos MD;  [facility]; Service: Gastroenterology    ESOPHAGOGASTRODUODENOSCOPY N/A 5/10/2018    Procedure: ESOPHAGOGASTRODUODENOSCOPY (EGD); Surgeon: Pennie High MD;  [facility]; Service: Gastroenterology    KNEE ARTHROSCOPY Bilateral     KNEE ARTHROSCOPY Right 2009    cibishino last assessed 08/24/2016   Isaac Peck LIVER SURGERY      NERVE BLOCK Bilateral 5/29/2018    Procedure: SPLANCHNIC NERVE BLOCK;  Surgeon: Micheline Dalton MD;  [facility];  Service: Pain Management     PANCREAS SURGERY      stents    KS INJECT NERV [address] Bilateral 5/9/2017    Procedure: CELIAC PLEXUS BLOCK ;  Surgeon: Micheline Dalton MD;  [facility];  Service: Pain Management     KS INJECT NERV BLCK,CELIAC PLEXUS Bilateral 6/1/2017    Procedure: SPLANCHNIC NERVE BLOCK at T12;  Surgeon: Micheline Dalton MD;  [facility];  Service: Pain Management     KS INJECT NERV BLCK,CELIAC PLEXUS Bilateral 8/8/2017    Procedure: BILATERAL SPLANCHNIC NERVE BLOCK T12;  Surgeon: Micheline Dalton MD;  [facility];  Service: Pain Management     KS INJECT NERV [place] Bilateral 12/28/2017    Procedure: SPLANCHNIC NERVE BLOCK;  Surgeon: Micheline Dalton MD;  [facility];  Service: Pain Management     KS LAP,CHOLECYSTECTOMY N/A 2/14/2017    Procedure: LAPAROSCOPIC CHOLECYSTECTOMY, IOC, POSSIBLE OPEN ;  Surgeon: Ana Torres MD;  [facility];  Service: General    ROTATOR CUFF REPAIR Right     SHOULDER ARTHROSCOPY      SHOULDER ARTHROSCOPY Right        Meds/Allergies:    Prior to Admission medications    Medication Sig Start Date End Date Taking? Authorizing Provider   acetaminophen (TYLENOL) 325 mg tablet Take 2 tablets (650 mg total) by mouth every 6 (six) hours as needed for fever 5/11/18   Hien John DO   albuterol (PROVENTIL HFA,VENTOLIN HFA) 90 mcg/act inhaler Inhale 1-2 puffs every 4 (four) hours as needed for wheezing      Historical Provider, MD Munoz Fenofibrate (FENOFIBRIC ACID) 135 MG CPDR Take 1 capsule by mouth daily 9/6/16   Historical Provider, MD   cyclobenzaprine (FLEXERIL) 10 mg tablet Take 1 tablet (10 mg total) by mouth 3 (three) times a day as needed for muscle spasms for up to 30 days 6/13/18 7/13/18  Fernando Weber MD   docusate sodium (COLACE) 100 mg capsule Take 1 capsule (100 mg total) by mouth 2 (two) times a day as needed for constipation 5/11/18   Hien John DO   EPINEPHrine (EPIPEN 2-LARRY) 0 3 mg/0 3 mL SOAJ EpiPen 2-Larry 0 3 MG/0 3ML Injection Solution Auto-injector  INJECT 0 3ML INTRAMUSCULARLY AS DIRECTED     Quantity: 1;  Refills: 1      Toro Briceño ; Active  2 Solution Auto-injector Pen    Historical Provider, MD   FLAXSEED LINSEED, PO Take 1,000 mg by mouth 3 (three) times a day    Historical Provider, MD   niacin (NIASPAN) 500 mg CR tablet Take 1 tablet (500 mg total) by mouth daily with breakfast for 90 days 6/12/18 9/10/18  LUANA Oden   omega-3-acid ethyl esters (LOVAZA) 1 g capsule Take 2 capsules (2 g total) by mouth 2 (two) times a day for 90 days 4/9/18 7/8/18  LUANA Oden   ondansetron (ZOFRAN-ODT) 4 mg disintegrating tablet Take 1 tablet (4 mg total) by mouth every 8 (eight) hours as needed for nausea or vomiting 5/1/18   LUANA Oden   oxyCODONE (ROXICODONE) 5 mg immediate release tablet Take 1 tablet (5 mg total) by mouth 2 (two) times a day as needed for moderate pain for up to 7 days Max Daily Amount: 10 mg 5/29/18 6/5/18  Fernando Weber MD   pancrelipase, Lip-Prot-Amyl, (CREON) 12,000 units capsule Take 12,000 units of lipase by mouth 3 (three) times a day with meals   4/13/17   Historical Provider, MD   Pancrelipase, Lip-Prot-Amyl, (CREON) 57108 units CPEP Take 1 capsule (36,000 Units total) by mouth 3 (three) times a day before meals 3/19/18   NICKY AmayaC   pantoprazole (PROTONIX) 40 mg tablet Take 1 tablet (40 mg total) by mouth 2 (two) times a day for 90 days 6/12/18 9/10/18  LUANA Rogers   ranitidine (ZANTAC) 150 MG capsule Take 150 mg by mouth 2 (two) times a day    Historical Provider, MD   rosuvastatin (CRESTOR) 20 MG tablet Take 1 tablet (20 mg total) by mouth daily  Patient taking differently: Take by mouth daily   3/19/18   LUANA Rogers   sucralfate (CARAFATE) 1 g tablet Take 1 tablet (1 g total) by mouth 4 (four) times a day for 7 days 5/11/18 5/18/18  Richard Neff DO   traZODone (DESYREL) 50 mg tablet Take 1 tablet (50 mg total) by mouth daily at bedtime 5/1/18   LUANA Rogers     I have reviewed home medications with patient personally  Allergies:    Allergies   Allergen Reactions    Bee Venom Swelling       Social History:     Marital Status: /Civil Union   Occupation:    Patient Pre-hospital Living Situation:  Lives at home  Patient Pre-hospital Level of Mobility:  Ambulatory  Patient Pre-hospital Diet Restrictions:  None  Substance Use History:   History   Alcohol Use    Yes     Comment: rarely, occasionally, history of no alcohol use per allscripts     History   Smoking Status    Current Every Day Smoker    Packs/day: 0 50    Years: 20 00   Smokeless Tobacco    Never Used     History   Drug Use No       Family History:    Family History   Problem Relation Age of Onset    Cirrhosis Mother     Heart disease Other         cardiac disorder    Cancer Other        Physical Exam:     Vitals:   Blood Pressure: (!) 183/103 (06/22/18 0230)  Pulse: 73 (06/22/18 0230)  Temperature: 98 4 °F (36 9 °C) (06/21/18 2257)  Temp Source: Oral (06/21/18 2257)  Respirations: 18 (06/22/18 0230)  Height: 5' 11" (180 3 cm) (06/21/18 2109)  Weight - Scale: 88 5 kg (195 lb) (06/21/18 2109)  SpO2: 92 % (06/22/18 0230)    Physical Exam   Constitutional: He is oriented to person, place, and time  He appears well-developed and well-nourished  HENT:   Head: Normocephalic and atraumatic  Right Ear: External ear normal    Left Ear: External ear normal    Nose: Nose normal    Mouth/Throat: Oropharynx is clear and moist    Eyes: Conjunctivae and EOM are normal  Pupils are equal, round, and reactive to light  Neck: Normal range of motion  Cardiovascular: Normal rate, regular rhythm and normal heart sounds  Pulmonary/Chest: Effort normal and breath sounds normal    Abdominal: Soft  Bowel sounds are normal  There is tenderness  Musculoskeletal: Normal range of motion  He exhibits no edema  Neurological: He is alert and oriented to person, place, and time  Skin: Skin is warm and dry  Psychiatric: He has a normal mood and affect  His behavior is normal  Judgment and thought content normal    Vitals reviewed  Additional Data:     Lab Results: I have personally reviewed pertinent reports  Results from last 7 days  Lab Units 06/21/18  2208   WBC Thousand/uL 13 89*   HEMOGLOBIN g/dL 15 2   HEMATOCRIT % 44 2   PLATELETS Thousands/uL 236   NEUTROS PCT % 76*   LYMPHS PCT % 15   MONOS PCT % 7   EOS PCT % 1       Results from last 7 days  Lab Units 06/21/18  2208   SODIUM mmol/L 135*   POTASSIUM mmol/L 3 3*   CHLORIDE mmol/L 101   CO2 mmol/L 25   BUN mg/dL 19   CREATININE mg/dL 0 90   CALCIUM mg/dL 8 9   TOTAL PROTEIN g/dL 7 0   BILIRUBIN TOTAL mg/dL 0 50   ALK PHOS U/L 69   ALT U/L 31   AST U/L 20   GLUCOSE RANDOM mg/dL 125       Results from last 7 days  Lab Units 06/21/18  2208   INR  0 99               Imaging: I have personally reviewed pertinent reports  CT abdomen pelvis with contrast -  VRAD read -   Evidence of pancreatitis  No evidence of necrosis    CT abdomen pelvis with contrast    (Results Pending)       EKG, Pathology, and Other Studies Reviewed on Admission:   · EKG:  NSR    Allscripts / Epic Records Reviewed: Yes     ** Please Note: This note has been constructed using a voice recognition system   **

## 2018-06-22 NOTE — ED NOTES
Pt c/o of 10/10 pain and requesting medication  Pt states previous dose of pain medication did not work  Physician called  Dr Sofía Grider will notify physician about medication order       Shanna Rolle  06/22/18 23 Suzanne Yanes  06/22/18 2715

## 2018-06-22 NOTE — ED NOTES
Patient is c/o 10/10 abdominal pain  Requesting additional pain medication, Dr Geiger made aware   Awaiting further orders at this time       Kaiden Israel RN  06/22/18 6038

## 2018-06-22 NOTE — CONSULTS
Consultation - [facility]  Geraldine Manzo 37 y o  male MRN: [id number]  Unit/Bed#: ED 16 Encounter: [id number]        Consults    Reason for Consult / Principal Problem: Abdominal Pain, History of Chronic Pancreatitis    HPI: Mr Dennie Atlas is a 38 yo M with a PMH of familial hypertriglyceridemia, history of severe pancreatitis 3-4 years ago complicated by pseudocyst requiring cyst gastrostomy and then further complicated by development of liver abscess, chronic pancreatitis requiring splanchnic nerve blocks for pain control and creon supplements, tobacco use, presenting with increasing, severe abdominal pain since yesterday  He was hospitalized in the beginning of May for similar pain, nausea, vomiting  He had an endoscopy on the 10th of May due to significant gastric wall thickening noted on CT scan  He was found to have mild esophagitis at the GE junction otherwise normal appearing stomach and duodenum  He was discharged and scheduled for a repeat nerve block with his pain management doctor  He had a nerve block on May 29th and it worked for a few days but then the pain started again  This is abnormal as the nerve block lasted multiple months last time he had it  He was started on Flexeril in addition to it for further pain control  The family reports there was discussion of repeating the nerve blocks sooner  The pain became excruciating yesterday and unbearable so he came to the ER  He was having associated nausea/vomiting  He denies any hematemesis  He continues to smoke  He denies any alcohol use in the past few weeks  His triglycerides were 461 during his last admission on May 9th  He denies any new medications except for Flexeril which he was started on by his pain management doctor      REVIEW OF SYSTEMS: Negative except for as stated above    Historical Information   Past Medical History:   Diagnosis Date    Asthma     Chronic pain disorder     GERD (gastroesophageal reflux disease)     Hyperlipidemia     Liver abscess     Meniscus tear     left knee work injury last assessed 08/24/2016    Pancreatitis     Pneumonia      Past Surgical History:   Procedure Laterality Date    CHOLECYSTECTOMY      ESOPHAGOGASTRODUODENOSCOPY N/A 11/16/2017    Procedure: ESOPHAGOGASTRODUODENOSCOPY (EGD); Surgeon: Danny Fenton MD;  [facility]; Service: Gastroenterology    ESOPHAGOGASTRODUODENOSCOPY N/A 4/19/2018    Procedure: ESOPHAGOGASTRODUODENOSCOPY (EGD); Surgeon: Angelita Gomez MD;  [facility]; Service: Gastroenterology    ESOPHAGOGASTRODUODENOSCOPY N/A 5/10/2018    Procedure: ESOPHAGOGASTRODUODENOSCOPY (EGD); Surgeon: Feliberto Galindo MD;  [facility];   Service: Gastroenterology    KNEE ARTHROSCOPY Bilateral     KNEE ARTHROSCOPY Right 2009    cibishino last assessed 08/24/2016   Padmaja Baker LIVER SURGERY      NERVE BLOCK Bilateral 5/29/2018    Procedure: SPLANCHNIC NERVE BLOCK;  Surgeon: Schuyler Barber MD;  [facility];  Service: Pain Management     PANCREAS SURGERY      stents    ID INJECT NERV [address] Bilateral 5/9/2017    Procedure: CELIAC PLEXUS BLOCK ;  Surgeon: Schuyler Barber MD;  [facility];  Service: Pain Management     ID INJECT NERV BLCK,CELIAC PLEXUS Bilateral 6/1/2017    Procedure: SPLANCHNIC NERVE BLOCK at T12;  Surgeon: Schuyler Barber MD;  [facility];  Service: Pain Management     ID INJECT NERV BLCK,CELIAC PLEXUS Bilateral 8/8/2017    Procedure: BILATERAL SPLANCHNIC NERVE BLOCK T12;  Surgeon: Schuyler Barber MD;  [facility];  Service: Pain Management     ID INJECT NERV [place] Bilateral 12/28/2017    Procedure: SPLANCHNIC NERVE BLOCK;  Surgeon: Schuyler Barber MD;  [facility];  Service: Pain Management     ID LAP,CHOLECYSTECTOMY N/A 2/14/2017    Procedure: LAPAROSCOPIC CHOLECYSTECTOMY, IOC, POSSIBLE OPEN ;  Surgeon: Tao Dominguez MD;  [facility];  Service: General  ROTATOR CUFF REPAIR Right     SHOULDER ARTHROSCOPY      SHOULDER ARTHROSCOPY Right      Social History   History   Alcohol Use    Yes     Comment: rarely, occasionally, history of no alcohol use per allscripts     History   Drug Use No     History   Smoking Status    Current Every Day Smoker    Packs/day: 0 50    Years: 20 00   Smokeless Tobacco    Never Used     Family History   Problem Relation Age of Onset    Cirrhosis Mother     Heart disease Other         cardiac disorder    Cancer Other        Meds/Allergies       (Not in a hospital admission)  Current Facility-Administered Medications   Medication Dose Route Frequency    atorvastatin (LIPITOR) tablet 40 mg  40 mg Oral Daily With Dinner    enoxaparin (LOVENOX) subcutaneous injection 40 mg  40 mg Subcutaneous Daily    famotidine (PEPCID) tablet 20 mg  20 mg Oral BID    fish oil capsule 1,000 mg  1,000 mg Oral Daily    HYDROmorphone (DILAUDID) injection 0 5 mg  0 5 mg Intravenous Q3H PRN    nicotine (NICODERM CQ) 21 mg/24 hr TD 24 hr patch 1 patch  1 patch Transdermal Daily    ondansetron (ZOFRAN) injection 4 mg  4 mg Intravenous Q6H PRN    pancrelipase (Lip-Prot-Amyl) (CREON) delayed release capsule 36,000 Units  36,000 Units Oral TID AC    pantoprazole (PROTONIX) EC tablet 40 mg  40 mg Oral BID    sodium chloride 0 9 % infusion  125 mL/hr Intravenous Continuous    traZODone (DESYREL) tablet 50 mg  50 mg Oral HS     Facility-Administered Medications Ordered in Other Encounters   Medication Dose Route Frequency    oxyCODONE-acetaminophen (PERCOCET) 5-325 mg per tablet 2 tablet  2 tablet Oral Once       Allergies   Allergen Reactions    Bee Venom Swelling           Objective     Blood pressure (!) 188/92, pulse 76, temperature 98 4 °F (36 9 °C), temperature source Oral, resp  rate 18, height 5' 11" (1 803 m), weight 88 5 kg (195 lb), SpO2 96 %      No intake or output data in the 24 hours ending 06/22/18 1315      PHYSICAL EXAM: General Appearance:   Alert, oriented x3, appears to be in moderate/severe pain   HENT[de-identified]   Eyes:  Normocephalic, atraumatic    Anicteric   Neck:  Supple, symmetrical, trachea midline   Lungs:   Clear to auscultation bilaterally, no respiratory distress   Heart[de-identified]   RRR, no murmur   Abdomen:   Non-distended, soft, BS active, (+) TTP in the epigastric/LUQ region   Rectal:   Deferred    Extremities:  No cyanosis or LE edema    Pulses:  2+ and symmetric all extremities    Skin:  No jaundice or pallor     Lab Results:     Results from last 7 days  Lab Units 06/22/18  0518   WBC Thousand/uL 13 56*   HEMOGLOBIN g/dL 15 0   HEMATOCRIT % 44 2   PLATELETS Thousands/uL 217   NEUTROS PCT % 71   LYMPHS PCT % 20   MONOS PCT % 7   EOS PCT % 2       Results from last 7 days  Lab Units 06/22/18  0518   SODIUM mmol/L 138   POTASSIUM mmol/L 3 6   CHLORIDE mmol/L 102   CO2 mmol/L 24   BUN mg/dL 14   CREATININE mg/dL 0 84   CALCIUM mg/dL 9 2   TOTAL PROTEIN g/dL 7 1   BILIRUBIN TOTAL mg/dL 1 20*   ALK PHOS U/L 68   ALT U/L 28   AST U/L 16   GLUCOSE RANDOM mg/dL 131       Results from last 7 days  Lab Units 06/21/18  2208   INR  0 99       Results from last 7 days  Lab Units 06/21/18  2208   LIPASE u/L 611*       Imaging Studies: I have personally reviewed pertinent imaging studies  Ct Abdomen Pelvis With Contrast  Result Date: 6/22/2018  Impression: Significant interval improvement in the gastric wall edema and fluid with mild residual remaining at the fundus and EG junction  The adjacent minimal fluid stranding about the pancreatic body and tail is also improved  Prior cholecystectomy        ASSESSMENT and PLAN:      Abdominal Pain  Chronic Pancreatitis  - CT A/P on admission with interval improvement of gastric wall edema and fluid with mild residual remaining fluid in the fundus and GE junction, minimal fluid stranding around pancreatic body improved  - Suspect he is having pain related to chronic pancreatitis and ineffective nerve block with pain management on 5/29  - Aggressive pain control  - Aggressive IVF hydration with lactated ringers  - Antiemetics PRN  - Check TG level due to his history of hypertriglyceridemia  - Discussed smoking cessation due to the risks of pancreatitis associated with smoking as this is contributing to his episodes  - Pt denies alcohol use recently  - Serial abdominal exams  - NPO until able to tolerate PO  - Creon supplements only if eating      Patient will be seen and examined by Dr Luis Daniel Ochoa

## 2018-06-22 NOTE — ED NOTES
Pt continues to have pain after 0 5 mg Dilaudid  Taiwo Samuels [facility] made aware   OK to give one more dose of 0 5 mg dilaudid IV       Grabiel Yarbrough, MAME  06/22/18 1298

## 2018-06-23 LAB
ALBUMIN SERPL BCP-MCNC: 3.6 G/DL (ref 3.5–5)
ALP SERPL-CCNC: 60 U/L (ref 46–116)
ALT SERPL W P-5'-P-CCNC: 23 U/L (ref 12–78)
ANION GAP SERPL CALCULATED.3IONS-SCNC: 11 MMOL/L (ref 4–13)
AST SERPL W P-5'-P-CCNC: 15 U/L (ref 5–45)
BILIRUB SERPL-MCNC: 1.9 MG/DL (ref 0.2–1)
BUN SERPL-MCNC: 5 MG/DL (ref 5–25)
CALCIUM SERPL-MCNC: 9.2 MG/DL (ref 8.3–10.1)
CHLORIDE SERPL-SCNC: 96 MMOL/L (ref 100–108)
CO2 SERPL-SCNC: 24 MMOL/L (ref 21–32)
CREAT SERPL-MCNC: 0.64 MG/DL (ref 0.6–1.3)
ERYTHROCYTE [DISTWIDTH] IN BLOOD BY AUTOMATED COUNT: 11.9 % (ref 11.6–15.1)
GFR SERPL CREATININE-BSD FRML MDRD: 118 ML/MIN/1.73SQ M
GLUCOSE SERPL-MCNC: 151 MG/DL (ref 65–140)
HCT VFR BLD AUTO: 42.3 % (ref 36.5–49.3)
HGB BLD-MCNC: 14.5 G/DL (ref 12–17)
LIPASE SERPL-CCNC: 710 U/L (ref 73–393)
MCH RBC QN AUTO: 31 PG (ref 26.8–34.3)
MCHC RBC AUTO-ENTMCNC: 34.3 G/DL (ref 31.4–37.4)
MCV RBC AUTO: 91 FL (ref 82–98)
PLATELET # BLD AUTO: 164 THOUSANDS/UL (ref 149–390)
PMV BLD AUTO: 9.9 FL (ref 8.9–12.7)
POTASSIUM SERPL-SCNC: 3.4 MMOL/L (ref 3.5–5.3)
PROT SERPL-MCNC: 7.5 G/DL (ref 6.4–8.2)
RBC # BLD AUTO: 4.67 MILLION/UL (ref 3.88–5.62)
SODIUM SERPL-SCNC: 131 MMOL/L (ref 136–145)
WBC # BLD AUTO: 14.36 THOUSAND/UL (ref 4.31–10.16)

## 2018-06-23 PROCEDURE — 80053 COMPREHEN METABOLIC PANEL: CPT | Performed by: INTERNAL MEDICINE

## 2018-06-23 PROCEDURE — 99232 SBSQ HOSP IP/OBS MODERATE 35: CPT | Performed by: INTERNAL MEDICINE

## 2018-06-23 PROCEDURE — 83690 ASSAY OF LIPASE: CPT | Performed by: INTERNAL MEDICINE

## 2018-06-23 PROCEDURE — 85027 COMPLETE CBC AUTOMATED: CPT | Performed by: INTERNAL MEDICINE

## 2018-06-23 RX ORDER — SODIUM CHLORIDE AND POTASSIUM CHLORIDE .9; .15 G/100ML; G/100ML
150 SOLUTION INTRAVENOUS CONTINUOUS
Status: DISCONTINUED | OUTPATIENT
Start: 2018-06-23 | End: 2018-06-26 | Stop reason: HOSPADM

## 2018-06-23 RX ORDER — POLYETHYLENE GLYCOL 3350 17 G/17G
17 POWDER, FOR SOLUTION ORAL DAILY
Status: DISCONTINUED | OUTPATIENT
Start: 2018-06-23 | End: 2018-06-26 | Stop reason: HOSPADM

## 2018-06-23 RX ORDER — NIACIN 500 MG/1
500 TABLET, EXTENDED RELEASE ORAL
Status: DISCONTINUED | OUTPATIENT
Start: 2018-06-23 | End: 2018-06-26 | Stop reason: HOSPADM

## 2018-06-23 RX ORDER — DOCUSATE SODIUM 100 MG/1
100 CAPSULE, LIQUID FILLED ORAL 2 TIMES DAILY
Status: DISCONTINUED | OUTPATIENT
Start: 2018-06-23 | End: 2018-06-26 | Stop reason: HOSPADM

## 2018-06-23 RX ORDER — FENOFIBRATE 145 MG/1
145 TABLET, COATED ORAL DAILY
Status: DISCONTINUED | OUTPATIENT
Start: 2018-06-23 | End: 2018-06-26 | Stop reason: HOSPADM

## 2018-06-23 RX ORDER — SENNOSIDES 8.6 MG
1 TABLET ORAL
Status: DISCONTINUED | OUTPATIENT
Start: 2018-06-23 | End: 2018-06-26 | Stop reason: HOSPADM

## 2018-06-23 RX ADMIN — HYDROMORPHONE HYDROCHLORIDE 1 MG: 1 INJECTION, SOLUTION INTRAMUSCULAR; INTRAVENOUS; SUBCUTANEOUS at 08:19

## 2018-06-23 RX ADMIN — PANTOPRAZOLE SODIUM 40 MG: 40 TABLET, DELAYED RELEASE ORAL at 05:07

## 2018-06-23 RX ADMIN — SODIUM CHLORIDE AND POTASSIUM CHLORIDE 150 ML/HR: .9; .15 SOLUTION INTRAVENOUS at 17:24

## 2018-06-23 RX ADMIN — ENOXAPARIN SODIUM 40 MG: 100 INJECTION SUBCUTANEOUS at 08:19

## 2018-06-23 RX ADMIN — PROMETHAZINE HYDROCHLORIDE 12.5 MG: 25 INJECTION INTRAMUSCULAR; INTRAVENOUS at 05:07

## 2018-06-23 RX ADMIN — ATORVASTATIN CALCIUM 40 MG: 40 TABLET, FILM COATED ORAL at 15:48

## 2018-06-23 RX ADMIN — FAMOTIDINE 20 MG: 20 TABLET ORAL at 17:23

## 2018-06-23 RX ADMIN — HYDROMORPHONE HYDROCHLORIDE 1 MG: 1 INJECTION, SOLUTION INTRAMUSCULAR; INTRAVENOUS; SUBCUTANEOUS at 14:26

## 2018-06-23 RX ADMIN — HYDROMORPHONE HYDROCHLORIDE 1 MG: 1 INJECTION, SOLUTION INTRAMUSCULAR; INTRAVENOUS; SUBCUTANEOUS at 20:38

## 2018-06-23 RX ADMIN — HYDROMORPHONE HYDROCHLORIDE 1 MG: 1 INJECTION, SOLUTION INTRAMUSCULAR; INTRAVENOUS; SUBCUTANEOUS at 23:45

## 2018-06-23 RX ADMIN — FAMOTIDINE 20 MG: 20 TABLET ORAL at 08:19

## 2018-06-23 RX ADMIN — HYDROMORPHONE HYDROCHLORIDE 1 MG: 1 INJECTION, SOLUTION INTRAMUSCULAR; INTRAVENOUS; SUBCUTANEOUS at 05:08

## 2018-06-23 RX ADMIN — NICOTINE 1 PATCH: 21 PATCH, EXTENDED RELEASE TRANSDERMAL at 09:00

## 2018-06-23 RX ADMIN — SODIUM CHLORIDE, SODIUM LACTATE, POTASSIUM CHLORIDE, AND CALCIUM CHLORIDE 200 ML/HR: .6; .31; .03; .02 INJECTION, SOLUTION INTRAVENOUS at 02:05

## 2018-06-23 RX ADMIN — HYDROMORPHONE HYDROCHLORIDE 1 MG: 1 INJECTION, SOLUTION INTRAMUSCULAR; INTRAVENOUS; SUBCUTANEOUS at 11:26

## 2018-06-23 RX ADMIN — FENOFIBRATE 145 MG: 145 TABLET, FILM COATED ORAL at 15:48

## 2018-06-23 RX ADMIN — ONDANSETRON 4 MG: 2 INJECTION INTRAMUSCULAR; INTRAVENOUS at 11:26

## 2018-06-23 RX ADMIN — DOCUSATE SODIUM 100 MG: 100 CAPSULE, LIQUID FILLED ORAL at 17:23

## 2018-06-23 RX ADMIN — TRAZODONE HYDROCHLORIDE 50 MG: 50 TABLET ORAL at 21:31

## 2018-06-23 RX ADMIN — SODIUM CHLORIDE, SODIUM LACTATE, POTASSIUM CHLORIDE, AND CALCIUM CHLORIDE 200 ML/HR: .6; .31; .03; .02 INJECTION, SOLUTION INTRAVENOUS at 13:01

## 2018-06-23 RX ADMIN — HYDROMORPHONE HYDROCHLORIDE 1 MG: 1 INJECTION, SOLUTION INTRAMUSCULAR; INTRAVENOUS; SUBCUTANEOUS at 02:04

## 2018-06-23 RX ADMIN — PANTOPRAZOLE SODIUM 40 MG: 40 TABLET, DELAYED RELEASE ORAL at 15:48

## 2018-06-23 RX ADMIN — HYDROMORPHONE HYDROCHLORIDE 1 MG: 1 INJECTION, SOLUTION INTRAMUSCULAR; INTRAVENOUS; SUBCUTANEOUS at 17:27

## 2018-06-23 NOTE — PLAN OF CARE
Problem: PAIN - ADULT  Goal: Verbalizes/displays adequate comfort level or baseline comfort level  Interventions:  - Encourage patient to monitor pain and request assistance  - Assess pain using appropriate pain scale  - Administer analgesics based on type and severity of pain and evaluate response  - Implement non-pharmacological measures as appropriate and evaluate response  - Consider cultural and social influences on pain and pain management  - Notify physician/advanced practitioner if interventions unsuccessful or patient reports new pain  Outcome: Progressing      Problem: INFECTION - ADULT  Goal: Absence or prevention of progression during hospitalization  INTERVENTIONS:  - Assess and monitor for signs and symptoms of infection  - Monitor lab/diagnostic results  - Monitor all insertion sites, i e  indwelling lines, tubes, and drains  - Monitor endotracheal (as able) and nasal secretions for changes in amount and color  - [place] appropriate cooling/warming therapies per order  - Administer medications as ordered  - Instruct and encourage patient and family to use good hand hygiene technique  - Identify and instruct in appropriate isolation precautions for identified infection/condition  Outcome: Progressing    Goal: Absence of fever/infection during neutropenic period  INTERVENTIONS:  - Monitor WBC  - Implement neutropenic guidelines  Outcome: Progressing      Problem: SAFETY ADULT  Goal: Patient will remain free of falls  INTERVENTIONS:  - Assess patient frequently for physical needs  -  Identify cognitive and physical deficits and behaviors that affect risk of falls    -  [place] fall precautions as indicated by assessment   - Educate patient/family on patient safety including physical limitations  - Instruct patient to call for assistance with activity based on assessment  - Modify environment to reduce risk of injury  - Consider OT/PT consult to assist with strengthening/mobility  Outcome: Progressing    Goal: Maintain or return to baseline ADL function  INTERVENTIONS:  -  Assess patient's ability to carry out ADLs; assess patient's baseline for ADL function and identify physical deficits which impact ability to perform ADLs (bathing, care of mouth/teeth, toileting, grooming, dressing, etc )  - Assess/evaluate cause of self-care deficits   - Assess range of motion  - Assess patient's mobility; develop plan if impaired  - Assess patient's need for assistive devices and provide as appropriate  - Encourage maximum independence but intervene and supervise when necessary  ¯ Involve family in performance of ADLs  ¯ Assess for home care needs following discharge   ¯ Request OT consult to assist with ADL evaluation and planning for discharge  ¯ Provide patient education as appropriate  Outcome: Progressing    Goal: Maintain or return mobility status to optimal level  INTERVENTIONS:  - Assess patient's baseline mobility status (ambulation, transfers, stairs, etc )    - Identify cognitive and physical deficits and behaviors that affect mobility  - Identify mobility aids required to assist with transfers and/or ambulation (gait belt, sit-to-stand, lift, walker, cane, etc )  - [place] fall precautions as indicated by assessment  - Record patient progress and toleration of activity level on Mobility SBAR; progress patient to next Phase/Stage  - Instruct patient to call for assistance with activity based on assessment  - Request Rehabilitation consult to assist with strengthening/weightbearing, etc   Outcome: Progressing      Problem: DISCHARGE PLANNING  Goal: Discharge to home or other facility with appropriate resources  INTERVENTIONS:  - Identify barriers to discharge w/patient and caregiver  - Arrange for needed discharge resources and transportation as appropriate  - Identify discharge learning needs (meds, wound care, etc )  - Arrange for interpretive services to assist at discharge as needed  - Refer to Case Management Department for coordinating discharge planning if the patient needs post-hospital services based on physician/advanced practitioner order or complex needs related to functional status, cognitive ability, or social support system  Outcome: Progressing      Problem: Knowledge Deficit  Goal: Patient/family/caregiver demonstrates understanding of disease process, treatment plan, medications, and discharge instructions  Complete learning assessment and assess knowledge base    Interventions:  - Provide teaching at level of understanding  - Provide teaching via preferred learning methods  Outcome: Progressing      Problem: GASTROINTESTINAL - ADULT  Goal: Minimal or absence of nausea and/or vomiting  INTERVENTIONS:  - Administer IV fluids as ordered to ensure adequate hydration  - Maintain NPO status until nausea and vomiting are resolved  - Nasogastric tube as ordered  - Administer ordered antiemetic medications as needed  - Provide nonpharmacologic comfort measures as appropriate  - Advance diet as tolerated, if ordered  - Nutrition services referral to assist patient with adequate nutrition and appropriate food choices  Outcome: Progressing    Goal: Maintains or returns to baseline bowel function  INTERVENTIONS:  - Assess bowel function  - Encourage oral fluids to ensure adequate hydration  - Administer IV fluids as ordered to ensure adequate hydration  - Administer ordered medications as needed  - Encourage mobilization and activity  - Nutrition services referral to assist patient with appropriate food choices  Outcome: Progressing    Goal: Maintains adequate nutritional intake  INTERVENTIONS:  - Monitor percentage of each meal consumed  - Identify factors contributing to decreased intake, treat as appropriate  - Assist with meals as needed  - Monitor I&O, WT and lab values  - Obtain nutrition services referral as needed  Outcome: Progressing    Goal: Establish and maintain optimal ostomy function  INTERVENTIONS:  - Assess bowel function  - Encourage oral fluids to ensure adequate hydration  - Administer IV fluids as ordered to ensure adequate hydration  - Administer ordered medications as needed  - Encourage mobilization and activity  - Nutrition services referral to assist patient with appropriate food choices  - Assess stoma site  Outcome: Progressing

## 2018-06-23 NOTE — PLAN OF CARE
DISCHARGE PLANNING - CARE MANAGEMENT     Discharge to post-acute care or home with appropriate resources Progressing

## 2018-06-23 NOTE — PROGRESS NOTES
Progress Note- Lashanda Pal 37 y o  male MRN: [id number]    Unit/Bed#: -01 Encounter: [id number]      Assessment and Plan:    37year old male with acute on chronic pancreatitis due to elevated triglycerides  TG still elevated despite multiple medications  He is already s/p cholecystectomy  Keep NPO today  Continue IVF  If pain improved tomorrow can likely start liquids and advance to low fat diet         ______________________________________________________________________    Subjective:     Pt still with abdominal pain  Not hungry        Medication Administration - last 24 hours from 06/22/2018 1702 to 06/23/2018 1702       Date/Time Order Dose Route Action Action by     06/23/2018 0820 fish oil capsule 1,000 mg 1,000 mg Oral Not Given Elza Prather, MAME     06/23/2018 1514 pancrelipase (Lip-Prot-Amyl) (CREON) delayed release capsule 36,000 Units 36,000 Units Oral Not Given Elza Prather, RN     06/23/2018 1126 pancrelipase (Lip-Prot-Amyl) (CREON) delayed release capsule 36,000 Units 36,000 Units Oral Not Given Elza Prather, RN     06/23/2018 0820 pancrelipase (Lip-Prot-Amyl) (CREON) delayed release capsule 36,000 Units 36,000 Units Oral Not Given Elza Prather, RN     06/23/2018 1548 pantoprazole (PROTONIX) EC tablet 40 mg 40 mg Oral Given Teena Kirby RN     06/23/2018 0507 pantoprazole (PROTONIX) EC tablet 40 mg 40 mg Oral Given Karie Madsen, MAME     06/23/2018 0819 famotidine (PEPCID) tablet 20 mg 20 mg Oral Given Teena Kirby, MAME     06/22/2018 1703 famotidine (PEPCID) tablet 20 mg 20 mg Oral Given Teena Kirby, RN     06/23/2018 1548 atorvastatin (LIPITOR) tablet 40 mg 40 mg Oral Given Teena Kirby, RN     06/22/2018 2142 traZODone (DESYREL) tablet 50 mg 50 mg Oral Given Karie Madsen RN     06/23/2018 1126 ondansetron (ZOFRAN) injection 4 mg 4 mg Intravenous Given Teena Baker RN     06/22/2018 2008 ondansetron (ZOFRAN) injection 4 mg 4 mg Intravenous Given Karie Madsen RN     06/23/2018 0900 nicotine (NICODERM CQ) 21 mg/24 hr TD 24 hr patch 1 patch 1 patch Transdermal Medication Applied Teena Samuel, RN     06/23/2018 0859 nicotine (NICODERM CQ) 21 mg/24 hr TD 24 hr patch 1 patch 1 patch Transdermal Patch Removed Troy Maday, RN     06/23/2018 0819 enoxaparin (LOVENOX) subcutaneous injection 40 mg 40 mg Subcutaneous Given Teena Farris Karla, RN     06/23/2018 1651 lactated ringers infusion 0 mL/hr Intravenous Stopped Teena Baker, RN     06/23/2018 1301 lactated ringers infusion 200 mL/hr Intravenous 1540 [facility] Brenton Lard, [address] [facility]     06/23/2018 0205 lactated ringers infusion 200 mL/hr Intravenous New Bag Maurene Brand, RN     06/22/2018 2008 lactated ringers infusion 200 mL/hr Intravenous New Bag Maurene Brand, RN     06/23/2018 1426 HYDROmorphone (DILAUDID) injection 1 mg 1 mg Intravenous Given Lannis Maday, RN     06/23/2018 1126 HYDROmorphone (DILAUDID) injection 1 mg 1 mg Intravenous Given Lannis Maday, RN     06/23/2018 4866 HYDROmorphone (DILAUDID) injection 1 mg 1 mg Intravenous Given Lannis Maday, RN     06/23/2018 6249 HYDROmorphone (DILAUDID) injection 1 mg 1 mg Intravenous Given Maurene Brand, RN     06/23/2018 4305 HYDROmorphone (DILAUDID) injection 1 mg 1 mg Intravenous Given Maurene Brand, RN     06/22/2018 2309 HYDROmorphone (DILAUDID) injection 1 mg 1 mg Intravenous Given Maurene Brand, RN     06/22/2018 2008 HYDROmorphone (DILAUDID) injection 1 mg 1 mg Intravenous Given Maurene Brand, RN     06/22/2018 1703 HYDROmorphone (DILAUDID) injection 1 mg 1 mg Intravenous Given Teena Samuel, RN     06/23/2018 0507 promethazine (PHENERGAN) injection 12 5 mg 12 5 mg Intravenous Given Maurene Brand, RN     06/23/2018 1525 niacin (NIASPAN) CR tablet 500 mg 500 mg Oral Not Given Lannis Maday, RN     06/23/2018 1548 fenofibrate (TRICOR) tablet 145 mg 145 mg Oral Given Teena Baker RN     06/23/2018 1525 polyethylene glycol (MIRALAX) packet 17 g 17 g Oral Not Given Troy Nassar RN          Objective:     Vitals: Blood pressure 168/94, pulse 90, temperature 98 2 °F (36 8 °C), temperature source Oral, resp  rate 18, height 5' 11" (1 803 m), weight 88 kg (194 lb 0 1 oz), SpO2 94 %  ,Body mass index is 27 06 kg/m²        Intake/Output Summary (Last 24 hours) at 06/23/18 1702  Last data filed at 06/23/18 1301   Gross per 24 hour   Intake             3190 ml   Output                0 ml   Net             3190 ml       Physical Exam:   General Appearance: Awake and alert, in no acute distress  Abdomen: Soft, tender to palpation in the epigastrium, non-distended; bowel sounds normal; no masses or no organomegaly    Invasive Devices     Peripheral Intravenous Line            Peripheral IV 06/21/18 Right Antecubital 2 days    Peripheral IV 06/21/18 Left Hand 1 day                Lab Results:  Admission on 06/21/2018   Component Date Value    WBC 06/21/2018 13 89*    RBC 06/21/2018 4 80     Hemoglobin 06/21/2018 15 2     Hematocrit 06/21/2018 44 2     MCV 06/21/2018 92     MCH 06/21/2018 31 7     MCHC 06/21/2018 34 4     RDW 06/21/2018 12 7     MPV 06/21/2018 9 9     Platelets 34/15/5785 236     nRBC 06/21/2018 0     Neutrophils Relative 06/21/2018 76*    Immat GRANS % 06/21/2018 1     Lymphocytes Relative 06/21/2018 15     Monocytes Relative 06/21/2018 7     Eosinophils Relative 06/21/2018 1     Basophils Relative 06/21/2018 0     Neutrophils Absolute 06/21/2018 10 50*    Immature Grans Absolute 06/21/2018 0 09     Lymphocytes Absolute 06/21/2018 2 05     Monocytes Absolute 06/21/2018 1 00     Eosinophils Absolute 06/21/2018 0 20     Basophils Absolute 06/21/2018 0 05     Sodium 06/21/2018 135*    Potassium 06/21/2018 3 3*    Chloride 06/21/2018 101     CO2 06/21/2018 25     Anion Gap 06/21/2018 9     BUN 06/21/2018 19     Creatinine 06/21/2018 0 90     Glucose 06/21/2018 125     Calcium 06/21/2018 8 9     eGFR 06/21/2018 103     Total Bilirubin 06/21/2018 0 50     Bilirubin, Direct 06/21/2018 0 07     Alkaline Phosphatase 06/21/2018 69     AST 06/21/2018 20     ALT 06/21/2018 31     Total Protein 06/21/2018 7 0     Albumin 06/21/2018 4 0     Lipase 06/21/2018 611*    Troponin I 06/21/2018 <0 02     Protime 06/21/2018 13 0     INR 06/21/2018 0 99     WBC 06/22/2018 13 56*    RBC 06/22/2018 4 79     Hemoglobin 06/22/2018 15 0     Hematocrit 06/22/2018 44 2     MCV 06/22/2018 92     MCH 06/22/2018 31 3     MCHC 06/22/2018 33 9     RDW 06/22/2018 12 4     MPV 06/22/2018 9 3     Platelets 23/56/1516 217     nRBC 06/22/2018 0     Neutrophils Relative 06/22/2018 71     Immat GRANS % 06/22/2018 0     Lymphocytes Relative 06/22/2018 20     Monocytes Relative 06/22/2018 7     Eosinophils Relative 06/22/2018 2     Basophils Relative 06/22/2018 0     Neutrophils Absolute 06/22/2018 9 45*    Immature Grans Absolute 06/22/2018 0 06     Lymphocytes Absolute 06/22/2018 2 77     Monocytes Absolute 06/22/2018 1 00     Eosinophils Absolute 06/22/2018 0 24     Basophils Absolute 06/22/2018 0 04     Sodium 06/22/2018 138     Potassium 06/22/2018 3 6     Chloride 06/22/2018 102     CO2 06/22/2018 24     Anion Gap 06/22/2018 12     BUN 06/22/2018 14     Creatinine 06/22/2018 0 84     Glucose 06/22/2018 131     Calcium 06/22/2018 9 2     AST 06/22/2018 16     ALT 06/22/2018 28     Alkaline Phosphatase 06/22/2018 68     Total Protein 06/22/2018 7 1     Albumin 06/22/2018 4 0     Total Bilirubin 06/22/2018 1 20*    eGFR 06/22/2018 106     Triglycerides 06/22/2018 898*    Ventricular Rate 06/21/2018 85     Atrial Rate 06/21/2018 85     OR Interval 06/21/2018 144     QRSD Interval 06/21/2018 112     QT Interval 06/21/2018 394     QTC Interval 06/21/2018 468     P Axis 06/21/2018 51     QRS Axis 06/21/2018 46     T Wave Axis 06/21/2018 32     Sodium 06/23/2018 131*    Potassium 06/23/2018 3 4*    Chloride 06/23/2018 96*    CO2 06/23/2018 24     Anion Gap 06/23/2018 11     BUN 06/23/2018 5     Creatinine 06/23/2018 0 64     Glucose 06/23/2018 151*    Calcium 06/23/2018 9 2     AST 06/23/2018 15     ALT 06/23/2018 23     Alkaline Phosphatase 06/23/2018 60     Total Protein 06/23/2018 7 5     Albumin 06/23/2018 3 6     Total Bilirubin 06/23/2018 1 90*    eGFR 06/23/2018 118     WBC 06/23/2018 14 36*    RBC 06/23/2018 4 67     Hemoglobin 06/23/2018 14 5     Hematocrit 06/23/2018 42 3     MCV 06/23/2018 91     MCH 06/23/2018 31 0     MCHC 06/23/2018 34 3     RDW 06/23/2018 11 9     Platelets 21/79/2042 164     MPV 06/23/2018 9 9     Lipase 06/23/2018 710*       Imaging Studies: I have personally reviewed pertinent imaging studies

## 2018-06-23 NOTE — PROGRESS NOTES
[facility] Progress Note  Patient: Craig Dear 37 y o  male   MRN: [id number]  PCP: Barb Cardona MD  Unit/Bed#: -01 Encounter: [id number]  Date Of Visit: 06/23/18    Assessment:    Principal Problem:    Chronic pancreatitis ([address] )  Active Problems:    Chronic gastritis without bleeding      Plan:    # Abdominal pain - secondary to chronic pancreatitis/chronic pain syndrome  S/p ct a/p actually improved from prior admission    - Hx of chronic pancreatitis secondary to hypertriglyceridemia, complicated pseudocyst   S/p recent nerve block end of may  - Repeat TG elevated from prior, he reports he is compliant with his meds and diet  - GI on board  - cont prn IV pain analgesics with bowel regimen, will start to titrate down tomorrow  - NPO  - IVF hydraiton  - cont supportive care  - pancreatic supplements when no longer NPO    # hypertriglyceridemia - continue statin, niacin, fenofibrate  - educated on low-fat low-cholesterol diet    # Hyponatremia - secondary to poor po intake, change IV fluids to normal saline    # Hypokalemia - replete accordingly    # Leukocytosis - likely reactive, no sign of acute infxn process, febrile    VTE Pharmacologic Prophylaxis:   Pharmacologic: Enoxaparin (Lovenox)  Mechanical VTE Prophylaxis in Place: Yes    Patient Centered Rounds: I have performed bedside rounds with nursing staff today  Discussions with Specialists or Other Care Team Provider:     Education and Discussions with Family / Patient:  Patient and his wife    Time Spent for Care: 30 minutes  More than 50% of total time spent on counseling and coordination of care as described above      Current Length of Stay: 1 day(s)    Current Patient Status: Inpatient   Certification Statement: The patient will continue to require additional inpatient hospital stay due to Chronic pancreatitis    Discharge Plan / Estimated Discharge Date:     Code Status: Level 1 - Full Code      Subjective:   Pt seen and examined at bedside  Wife at bedside  Asking for IV Dilaudid exactly every 3 hrs; Remains npo  Reporting still of abd pain denies nausea vomiting    Objective:     Vitals:   Temp (24hrs), Av 4 °F (36 9 °C), Min:98 2 °F (36 8 °C), Max:98 8 °F (37 1 °C)    HR:  [65-90] 90  Resp:  [18] 18  BP: (148-168)/(82-95) 168/94  SpO2:  [94 %-96 %] 94 %  Body mass index is 27 06 kg/m²  Input and Output Summary (last 24 hours): Intake/Output Summary (Last 24 hours) at 18 1616  Last data filed at 18 1301   Gross per 24 hour   Intake             3190 ml   Output                0 ml   Net             3190 ml       Physical Exam:     Physical Exam   Constitutional: He is oriented to person, place, and time  He appears well-developed  Neck: Neck supple  Cardiovascular: Normal rate, regular rhythm, normal heart sounds and intact distal pulses  Exam reveals no gallop and no friction rub  No murmur heard  Pulmonary/Chest: Effort normal and breath sounds normal  No respiratory distress  He has no wheezes  He has no rales  He exhibits no tenderness  Abdominal: Soft  Bowel sounds are normal  He exhibits no distension  There is tenderness  There is no rebound and no guarding  Musculoskeletal: Normal range of motion  He exhibits no edema, tenderness or deformity  Neurological: He is alert and oriented to person, place, and time  He has normal reflexes  He displays normal reflexes  No cranial nerve deficit  He exhibits normal muscle tone  Coordination normal    Skin: Skin is warm and dry       Additional Data:     Labs:      Results from last 7 days  Lab Units 18  0520 18  0518   WBC Thousand/uL 14 36* 13 56*   HEMOGLOBIN g/dL 14 5 15 0   HEMATOCRIT % 42 3 44 2   PLATELETS Thousands/uL 164 217   NEUTROS PCT %  --  71   LYMPHS PCT %  --  20   MONOS PCT %  --  7   EOS PCT %  --  2       Results from last 7 days  Lab Units 18  0520   SODIUM mmol/L 131*   POTASSIUM mmol/L 3 4*   CHLORIDE mmol/L 96*   CO2 mmol/L 24   BUN mg/dL 5   CREATININE mg/dL 0 64   CALCIUM mg/dL 9 2   TOTAL PROTEIN g/dL 7 5   BILIRUBIN TOTAL mg/dL 1 90*   ALK PHOS U/L 60   ALT U/L 23   AST U/L 15   GLUCOSE RANDOM mg/dL 151*       Results from last 7 days  Lab Units 06/21/18  2208   INR  0 99       * I Have Reviewed All Lab Data Listed Above  * Additional Pertinent Lab Tests Reviewed:  All Labs Within Last 24 Hours Reviewed    Imaging:    Imaging Reports Reviewed Today Include:   Imaging Personally Reviewed by Myself Includes:      Recent Cultures (last 7 days):           Last 24 Hours Medication List:     Current Facility-Administered Medications:  atorvastatin 40 mg Oral Daily With Advance RAY Benjamin    docusate sodium 100 mg Oral BID Cuca Field, DO    enoxaparin 40 mg Subcutaneous Daily Jorge A Rai PA-C    famotidine 20 mg Oral BID Jorge A Rai PA-C    fenofibrate 145 mg Oral Daily Cuca Field, DO    fish oil 1,000 mg Oral Daily Jorge A Rai PA-C    hydrALAZINE 10 mg Intravenous Q6H PRN Cuca Field, DO    HYDROmorphone 1 mg Intravenous Q3H PRN Cuca Field, DO    lactated ringers 200 mL/hr Intravenous Continuous Botwan Boyd PA-C Last Rate: 200 mL/hr (06/23/18 1301)   niacin 500 mg Oral Daily With Breakfast Cuca Field, DO    nicotine 1 patch Transdermal Daily Jorge A Rai PA-C    ondansetron 4 mg Intravenous Q6H PRN Jorge A Rai PA-C    pancrelipase (Lip-Prot-Amyl) 36,000 Units Oral TID AC Dinora Shadyio Sicard, PA-C    pantoprazole 40 mg Oral BID Jorge A Rai PA-C    polyethylene glycol 17 g Oral Daily Cuca Field, DO    promethazine 12 5 mg Intravenous Q6H PRN Cuca Field, DO    senna 1 tablet Oral HS Cuca Field, DO    traZODone 50 mg Oral HS Jorge A Rai PA-C      Facility-Administered Medications Ordered in Other Encounters:  oxyCODONE-acetaminophen 2 tablet Oral Once Syeda Duvall MD        Today, Patient Was Seen By: Minh George Venessa Peña, DO    ** Please Note: This note has been constructed using a voice recognition system   **

## 2018-06-23 NOTE — PLAN OF CARE
PAIN - ADULT     Verbalizes/displays adequate comfort level or baseline comfort level Not Progressing          DISCHARGE PLANNING     Discharge to home or other facility with appropriate resources Progressing        DISCHARGE PLANNING - CARE MANAGEMENT     Discharge to post-acute care or home with appropriate resources Progressing        GASTROINTESTINAL - ADULT     Minimal or absence of nausea and/or vomiting Progressing     Maintains or returns to baseline bowel function Progressing     Maintains adequate nutritional intake Progressing     Establish and maintain optimal ostomy function Progressing        INFECTION - ADULT     Absence or prevention of progression during hospitalization Progressing     Absence of fever/infection during neutropenic period Progressing        Knowledge Deficit     Patient/family/caregiver demonstrates understanding of disease process, treatment plan, medications, and discharge instructions Progressing        SAFETY ADULT     Patient will remain free of falls Progressing     Maintain or return to baseline ADL function Progressing     Maintain or return mobility status to optimal level Progressing

## 2018-06-24 PROCEDURE — 99232 SBSQ HOSP IP/OBS MODERATE 35: CPT | Performed by: INTERNAL MEDICINE

## 2018-06-24 RX ADMIN — HYDROMORPHONE HYDROCHLORIDE 0.5 MG: 1 INJECTION, SOLUTION INTRAMUSCULAR; INTRAVENOUS; SUBCUTANEOUS at 18:22

## 2018-06-24 RX ADMIN — FAMOTIDINE 20 MG: 20 TABLET ORAL at 08:41

## 2018-06-24 RX ADMIN — FAMOTIDINE 20 MG: 20 TABLET ORAL at 17:30

## 2018-06-24 RX ADMIN — ONDANSETRON 4 MG: 2 INJECTION INTRAMUSCULAR; INTRAVENOUS at 15:16

## 2018-06-24 RX ADMIN — HYDROMORPHONE HYDROCHLORIDE 1 MG: 1 INJECTION, SOLUTION INTRAMUSCULAR; INTRAVENOUS; SUBCUTANEOUS at 02:37

## 2018-06-24 RX ADMIN — NICOTINE 1 PATCH: 21 PATCH, EXTENDED RELEASE TRANSDERMAL at 08:41

## 2018-06-24 RX ADMIN — ATORVASTATIN CALCIUM 40 MG: 40 TABLET, FILM COATED ORAL at 15:38

## 2018-06-24 RX ADMIN — FENOFIBRATE 145 MG: 145 TABLET, FILM COATED ORAL at 08:41

## 2018-06-24 RX ADMIN — PANTOPRAZOLE SODIUM 40 MG: 40 TABLET, DELAYED RELEASE ORAL at 05:27

## 2018-06-24 RX ADMIN — PANTOPRAZOLE SODIUM 40 MG: 40 TABLET, DELAYED RELEASE ORAL at 15:38

## 2018-06-24 RX ADMIN — DOCUSATE SODIUM 100 MG: 100 CAPSULE, LIQUID FILLED ORAL at 08:41

## 2018-06-24 RX ADMIN — HYDROMORPHONE HYDROCHLORIDE 1 MG: 1 INJECTION, SOLUTION INTRAMUSCULAR; INTRAVENOUS; SUBCUTANEOUS at 05:26

## 2018-06-24 RX ADMIN — HYDROMORPHONE HYDROCHLORIDE 1 MG: 1 INJECTION, SOLUTION INTRAMUSCULAR; INTRAVENOUS; SUBCUTANEOUS at 12:05

## 2018-06-24 RX ADMIN — HYDROMORPHONE HYDROCHLORIDE 0.5 MG: 1 INJECTION, SOLUTION INTRAMUSCULAR; INTRAVENOUS; SUBCUTANEOUS at 15:16

## 2018-06-24 RX ADMIN — HYDROMORPHONE HYDROCHLORIDE 0.5 MG: 1 INJECTION, SOLUTION INTRAMUSCULAR; INTRAVENOUS; SUBCUTANEOUS at 21:26

## 2018-06-24 RX ADMIN — HYDROMORPHONE HYDROCHLORIDE 1 MG: 1 INJECTION, SOLUTION INTRAMUSCULAR; INTRAVENOUS; SUBCUTANEOUS at 08:41

## 2018-06-24 RX ADMIN — DOCUSATE SODIUM 100 MG: 100 CAPSULE, LIQUID FILLED ORAL at 17:30

## 2018-06-24 RX ADMIN — ENOXAPARIN SODIUM 40 MG: 100 INJECTION SUBCUTANEOUS at 08:41

## 2018-06-24 RX ADMIN — SODIUM CHLORIDE AND POTASSIUM CHLORIDE 150 ML/HR: .9; .15 SOLUTION INTRAVENOUS at 02:41

## 2018-06-24 RX ADMIN — SODIUM CHLORIDE AND POTASSIUM CHLORIDE 150 ML/HR: .9; .15 SOLUTION INTRAVENOUS at 12:10

## 2018-06-24 RX ADMIN — TRAZODONE HYDROCHLORIDE 50 MG: 50 TABLET ORAL at 21:27

## 2018-06-24 NOTE — PLAN OF CARE
Problem: PAIN - ADULT  Goal: Verbalizes/displays adequate comfort level or baseline comfort level  Interventions:  - Encourage patient to monitor pain and request assistance  - Assess pain using appropriate pain scale  - Administer analgesics based on type and severity of pain and evaluate response  - Implement non-pharmacological measures as appropriate and evaluate response  - Consider cultural and social influences on pain and pain management  - Notify physician/advanced practitioner if interventions unsuccessful or patient reports new pain   Outcome: Progressing      Problem: INFECTION - ADULT  Goal: Absence or prevention of progression during hospitalization  INTERVENTIONS:  - Assess and monitor for signs and symptoms of infection  - Monitor lab/diagnostic results  - Monitor all insertion sites, i e  indwelling lines, tubes, and drains  - Monitor endotracheal (as able) and nasal secretions for changes in amount and color  - [place] appropriate cooling/warming therapies per order  - Administer medications as ordered  - Instruct and encourage patient and family to use good hand hygiene technique  - Identify and instruct in appropriate isolation precautions for identified infection/condition   Outcome: Progressing    Goal: Absence of fever/infection during neutropenic period  INTERVENTIONS:  - Monitor WBC  - Implement neutropenic guidelines   Outcome: Progressing      Problem: SAFETY ADULT  Goal: Patient will remain free of falls  INTERVENTIONS:  - Assess patient frequently for physical needs  -  Identify cognitive and physical deficits and behaviors that affect risk of falls    -  [place] fall precautions as indicated by assessment   - Educate patient/family on patient safety including physical limitations  - Instruct patient to call for assistance with activity based on assessment  - Modify environment to reduce risk of injury  - Consider OT/PT consult to assist with strengthening/mobility   Outcome: Progressing    Goal: Maintain or return to baseline ADL function  INTERVENTIONS:  -  Assess patient's ability to carry out ADLs; assess patient's baseline for ADL function and identify physical deficits which impact ability to perform ADLs (bathing, care of mouth/teeth, toileting, grooming, dressing, etc )  - Assess/evaluate cause of self-care deficits   - Assess range of motion  - Assess patient's mobility; develop plan if impaired  - Assess patient's need for assistive devices and provide as appropriate  - Encourage maximum independence but intervene and supervise when necessary  ¯ Involve family in performance of ADLs  ¯ Assess for home care needs following discharge   ¯ Request OT consult to assist with ADL evaluation and planning for discharge  ¯ Provide patient education as appropriate   Outcome: Progressing    Goal: Maintain or return mobility status to optimal level  INTERVENTIONS:  - Assess patient's baseline mobility status (ambulation, transfers, stairs, etc )    - Identify cognitive and physical deficits and behaviors that affect mobility  - Identify mobility aids required to assist with transfers and/or ambulation (gait belt, sit-to-stand, lift, walker, cane, etc )  - [place] fall precautions as indicated by assessment  - Record patient progress and toleration of activity level on Mobility SBAR; progress patient to next Phase/Stage  - Instruct patient to call for assistance with activity based on assessment  - Request Rehabilitation consult to assist with strengthening/weightbearing, etc    Outcome: Progressing      Problem: DISCHARGE PLANNING  Goal: Discharge to home or other facility with appropriate resources  INTERVENTIONS:  - Identify barriers to discharge w/patient and caregiver  - Arrange for needed discharge resources and transportation as appropriate  - Identify discharge learning needs (meds, wound care, etc )  - Arrange for interpretive services to assist at discharge as needed  - Refer to Case Management Department for coordinating discharge planning if the patient needs post-hospital services based on physician/advanced practitioner order or complex needs related to functional status, cognitive ability, or social support system   Outcome: Progressing      Problem: Knowledge Deficit  Goal: Patient/family/caregiver demonstrates understanding of disease process, treatment plan, medications, and discharge instructions  Complete learning assessment and assess knowledge base    Interventions:  - Provide teaching at level of understanding  - Provide teaching via preferred learning methods   Outcome: Progressing      Problem: GASTROINTESTINAL - ADULT  Goal: Minimal or absence of nausea and/or vomiting  INTERVENTIONS:  - Administer IV fluids as ordered to ensure adequate hydration  - Maintain NPO status until nausea and vomiting are resolved  - Nasogastric tube as ordered  - Administer ordered antiemetic medications as needed  - Provide nonpharmacologic comfort measures as appropriate  - Advance diet as tolerated, if ordered  - Nutrition services referral to assist patient with adequate nutrition and appropriate food choices   Outcome: Progressing    Goal: Maintains or returns to baseline bowel function  INTERVENTIONS:  - Assess bowel function  - Encourage oral fluids to ensure adequate hydration  - Administer IV fluids as ordered to ensure adequate hydration  - Administer ordered medications as needed  - Encourage mobilization and activity  - Nutrition services referral to assist patient with appropriate food choices   Outcome: Progressing    Goal: Maintains adequate nutritional intake  INTERVENTIONS:  - Monitor percentage of each meal consumed  - Identify factors contributing to decreased intake, treat as appropriate  - Assist with meals as needed  - Monitor I&O, WT and lab values  - Obtain nutrition services referral as needed   Outcome: Progressing    Goal: Establish and maintain optimal ostomy function  INTERVENTIONS:  - Assess bowel function  - Encourage oral fluids to ensure adequate hydration  - Administer IV fluids as ordered to ensure adequate hydration  - Administer ordered medications as needed  - Encourage mobilization and activity  - Nutrition services referral to assist patient with appropriate food choices  - Assess stoma site   Outcome: Progressing

## 2018-06-24 NOTE — PROGRESS NOTES
[facility] Progress Note  Patient: Lolis Sanchez 37 y o  male   MRN: [id number]  PCP: Christos Day MD  Unit/Bed#: -01 Encounter: [id number]  Date Of Visit: 06/24/18    Assessment:    Principal Problem:    Chronic pancreatitis ([address] )  Active Problems:    Chronic gastritis without bleeding      Plan:    # Abdominal pain - secondary to chronic pancreatitis/chronic pain syndrome  S/p ct a/p actually improved from prior admission    - Hx of chronic pancreatitis secondary to hypertriglyceridemia, complicated pseudocyst   S/p recent nerve block end of may  - Repeat TG elevated from prior, he reports he is compliant with his meds and diet  - GI on board  - cont prn IV pain analgesics with bowel regimen, start to titrate down tomorrow  - Start on clears  - IVF hydraiton  - cont supportive care  - resume creon    # hypertriglyceridemia - continue statin, niacin, fenofibrate  - educated on low-fat low-cholesterol diet    # Hyponatremia - secondary to poor po intake, cont IVF hydration    # Leukocytosis - likely reactive, no sign of acute infxn process, febrile    VTE Pharmacologic Prophylaxis:   Pharmacologic: Enoxaparin (Lovenox)  Mechanical VTE Prophylaxis in Place: Yes    Patient Centered Rounds: I have performed bedside rounds with nursing staff today  Discussions with Specialists or Other Care Team Provider:     Education and Discussions with Family / Patient:  Patient and his wife    Time Spent for Care: 30 minutes  More than 50% of total time spent on counseling and coordination of care as described above      Current Length of Stay: 2 day(s)    Current Patient Status: Inpatient   Certification Statement: The patient will continue to require additional inpatient hospital stay due to Chronic pancreatitis    Discharge Plan / Estimated Discharge Date:     Code Status: Level 1 - Full Code      Subjective:   Has been asking for dilaudid exactly on the dot sometimes pt is even sleeping when RN goes in  No n/v  Feels ready to start on clears    Objective:     Vitals:   Temp (24hrs), Av 3 °F (36 8 °C), Min:98 2 °F (36 8 °C), Max:98 5 °F (36 9 °C)    HR:  [] 103  Resp:  [18-20] 20  BP: (137-168)/(87-94) 137/91  SpO2:  [94 %-95 %] 95 %  Body mass index is 27 06 kg/m²  Input and Output Summary (last 24 hours): Intake/Output Summary (Last 24 hours) at 18 1332  Last data filed at 18 0300   Gross per 24 hour   Intake             1000 ml   Output              650 ml   Net              350 ml       Physical Exam:     Physical Exam   Constitutional: He is oriented to person, place, and time  He appears well-developed  Neck: Neck supple  Cardiovascular: Normal rate, regular rhythm, normal heart sounds and intact distal pulses  Exam reveals no gallop and no friction rub  No murmur heard  Pulmonary/Chest: Effort normal and breath sounds normal  No respiratory distress  He has no wheezes  He has no rales  He exhibits no tenderness  Abdominal: Soft  Bowel sounds are normal  He exhibits no distension  There is tenderness  There is no rebound and no guarding  Musculoskeletal: Normal range of motion  He exhibits no edema, tenderness or deformity  Neurological: He is alert and oriented to person, place, and time  He has normal reflexes  No cranial nerve deficit  He exhibits normal muscle tone  Coordination normal    Skin: Skin is warm and dry       Additional Data:     Labs:      Results from last 7 days  Lab Units 18  0520 18  0518   WBC Thousand/uL 14 36* 13 56*   HEMOGLOBIN g/dL 14 5 15 0   HEMATOCRIT % 42 3 44 2   PLATELETS Thousands/uL 164 217   NEUTROS PCT %  --  71   LYMPHS PCT %  --  20   MONOS PCT %  --  7   EOS PCT %  --  2       Results from last 7 days  Lab Units 18  0520   SODIUM mmol/L 131*   POTASSIUM mmol/L 3 4*   CHLORIDE mmol/L 96*   CO2 mmol/L 24   BUN mg/dL 5   CREATININE mg/dL 0 64   CALCIUM mg/dL 9 2   TOTAL PROTEIN g/dL 7 5   BILIRUBIN TOTAL mg/dL 1 90*   ALK PHOS U/L 60   ALT U/L 23   AST U/L 15   GLUCOSE RANDOM mg/dL 151*       Results from last 7 days  Lab Units 06/21/18  2208   INR  0 99       * I Have Reviewed All Lab Data Listed Above  * Additional Pertinent Lab Tests Reviewed: No New Labs Available For Today    Imaging:    Imaging Reports Reviewed Today Include:   Imaging Personally Reviewed by Myself Includes:      Recent Cultures (last 7 days):           Last 24 Hours Medication List:     Current Facility-Administered Medications:  atorvastatin 40 mg Oral Daily With [employer] , PA-C    docusate sodium 100 mg Oral BID [facility], DO    [facility]arin [facility]cutaneous [facility]    famotidine 20 mg Oral BID [facility], PA-C    fenofibrate 145 mg Oral Daily [facility], DO    fish oil 1,000 mg Oral Daily [facility], [facility]    hydrALAZINE 10 mg Intravenous Q6H PRN [facility], DO    HYDROmorphone 0 5 mg Intravenous Q3H PRN [facility], DO    niacin 500 mg Oral Daily With Breakfast [facility] Kalyan, DO    nicotine 1 patch Transdermal Daily [facility], [facility]-TAYL[facility]    ondansetron 4 mg Intravenous Q6H PRN [facility], PA-C    pancrelipase (Lip-Prot-Amyl) 36,000 Units Oral TID AC [facility], PA-TAYLOR    pantoprazole 40 mg Oral BID [facility]-TAYLOR    polyethylene glycol 17 g Oral Daily [facility],     promethazine 12 5 mg Intravenous Q6H PRN [facility],     senna 1 tablet Oral HS [facility], DO    sodium chloride 0 9 % with KCl 20 mEq/L 150 mL/hr Intravenous Continuous Jeanine Kalyan,  Last Rate: 150 mL/hr (06/24/18 1210)   traZODone 50 mg Oral HS KrystleBROOKLYNN Sykes-TAYLOR      Facility-Administered Medications Ordered in Other Encounters:  oxyCODONE-acetaminophen 2 tablet Oral Once Deborah Kumar MD        Today, Patient Was Seen By: Jeanine Biggs DO    ** Please Note: This note has been constructed using a voice recognition system   **

## 2018-06-25 LAB
ALBUMIN SERPL BCP-MCNC: 2.7 G/DL (ref 3.5–5)
ALP SERPL-CCNC: 87 U/L (ref 46–116)
ALT SERPL W P-5'-P-CCNC: 68 U/L (ref 12–78)
ANION GAP SERPL CALCULATED.3IONS-SCNC: 11 MMOL/L (ref 4–13)
AST SERPL W P-5'-P-CCNC: 72 U/L (ref 5–45)
BILIRUB DIRECT SERPL-MCNC: 0.29 MG/DL (ref 0–0.2)
BILIRUB DIRECT SERPL-MCNC: 0.34 MG/DL (ref 0–0.2)
BILIRUB SERPL-MCNC: 0.9 MG/DL (ref 0.2–1)
BUN SERPL-MCNC: 9 MG/DL (ref 5–25)
CALCIUM SERPL-MCNC: 9.1 MG/DL (ref 8.3–10.1)
CHLORIDE SERPL-SCNC: 99 MMOL/L (ref 100–108)
CO2 SERPL-SCNC: 25 MMOL/L (ref 21–32)
CREAT SERPL-MCNC: 0.62 MG/DL (ref 0.6–1.3)
GFR SERPL CREATININE-BSD FRML MDRD: 120 ML/MIN/1.73SQ M
GLUCOSE SERPL-MCNC: 112 MG/DL (ref 65–140)
LIPASE SERPL-CCNC: 314 U/L (ref 73–393)
MAGNESIUM SERPL-MCNC: 2 MG/DL (ref 1.6–2.6)
POTASSIUM SERPL-SCNC: 3.6 MMOL/L (ref 3.5–5.3)
PROT SERPL-MCNC: 7.1 G/DL (ref 6.4–8.2)
SODIUM SERPL-SCNC: 135 MMOL/L (ref 136–145)

## 2018-06-25 PROCEDURE — 99232 SBSQ HOSP IP/OBS MODERATE 35: CPT | Performed by: INTERNAL MEDICINE

## 2018-06-25 PROCEDURE — 99232 SBSQ HOSP IP/OBS MODERATE 35: CPT | Performed by: PHYSICIAN ASSISTANT

## 2018-06-25 PROCEDURE — 80048 BASIC METABOLIC PNL TOTAL CA: CPT | Performed by: INTERNAL MEDICINE

## 2018-06-25 PROCEDURE — 83735 ASSAY OF MAGNESIUM: CPT | Performed by: INTERNAL MEDICINE

## 2018-06-25 PROCEDURE — 83690 ASSAY OF LIPASE: CPT | Performed by: INTERNAL MEDICINE

## 2018-06-25 PROCEDURE — 82248 BILIRUBIN DIRECT: CPT | Performed by: PHYSICIAN ASSISTANT

## 2018-06-25 PROCEDURE — 80076 HEPATIC FUNCTION PANEL: CPT | Performed by: PHYSICIAN ASSISTANT

## 2018-06-25 RX ORDER — OXYCODONE HYDROCHLORIDE 5 MG/1
5 TABLET ORAL EVERY 4 HOURS PRN
Status: DISCONTINUED | OUTPATIENT
Start: 2018-06-25 | End: 2018-06-26 | Stop reason: HOSPADM

## 2018-06-25 RX ORDER — CHLORAL HYDRATE 500 MG
1000 CAPSULE ORAL 2 TIMES DAILY
Status: DISCONTINUED | OUTPATIENT
Start: 2018-06-25 | End: 2018-06-26 | Stop reason: HOSPADM

## 2018-06-25 RX ADMIN — Medication 1000 MG: at 09:26

## 2018-06-25 RX ADMIN — SODIUM CHLORIDE AND POTASSIUM CHLORIDE 150 ML/HR: .9; .15 SOLUTION INTRAVENOUS at 05:07

## 2018-06-25 RX ADMIN — ATORVASTATIN CALCIUM 40 MG: 40 TABLET, FILM COATED ORAL at 17:08

## 2018-06-25 RX ADMIN — ENOXAPARIN SODIUM 40 MG: 100 INJECTION SUBCUTANEOUS at 09:26

## 2018-06-25 RX ADMIN — SODIUM CHLORIDE AND POTASSIUM CHLORIDE 150 ML/HR: .9; .15 SOLUTION INTRAVENOUS at 17:10

## 2018-06-25 RX ADMIN — FAMOTIDINE 20 MG: 20 TABLET ORAL at 09:26

## 2018-06-25 RX ADMIN — PANCRELIPASE 36000 UNITS: 24000; 76000; 120000 CAPSULE, DELAYED RELEASE PELLETS ORAL at 09:27

## 2018-06-25 RX ADMIN — TRAZODONE HYDROCHLORIDE 50 MG: 50 TABLET ORAL at 21:11

## 2018-06-25 RX ADMIN — HYDROMORPHONE HYDROCHLORIDE 0.5 MG: 1 INJECTION, SOLUTION INTRAMUSCULAR; INTRAVENOUS; SUBCUTANEOUS at 09:31

## 2018-06-25 RX ADMIN — NIACIN 500 MG: 500 TABLET, FILM COATED, EXTENDED RELEASE ORAL at 09:25

## 2018-06-25 RX ADMIN — HYDROMORPHONE HYDROCHLORIDE 0.5 MG: 1 INJECTION, SOLUTION INTRAMUSCULAR; INTRAVENOUS; SUBCUTANEOUS at 05:05

## 2018-06-25 RX ADMIN — PANTOPRAZOLE SODIUM 40 MG: 40 TABLET, DELAYED RELEASE ORAL at 17:08

## 2018-06-25 RX ADMIN — PANCRELIPASE 36000 UNITS: 24000; 76000; 120000 CAPSULE, DELAYED RELEASE PELLETS ORAL at 11:47

## 2018-06-25 RX ADMIN — PANTOPRAZOLE SODIUM 40 MG: 40 TABLET, DELAYED RELEASE ORAL at 05:10

## 2018-06-25 RX ADMIN — Medication 1000 MG: at 17:08

## 2018-06-25 RX ADMIN — FAMOTIDINE 20 MG: 20 TABLET ORAL at 17:08

## 2018-06-25 RX ADMIN — HYDROMORPHONE HYDROCHLORIDE 0.2 MG: 1 INJECTION, SOLUTION INTRAMUSCULAR; INTRAVENOUS; SUBCUTANEOUS at 20:44

## 2018-06-25 RX ADMIN — HYDROMORPHONE HYDROCHLORIDE 0.5 MG: 1 INJECTION, SOLUTION INTRAMUSCULAR; INTRAVENOUS; SUBCUTANEOUS at 00:23

## 2018-06-25 RX ADMIN — PANCRELIPASE 36000 UNITS: 24000; 76000; 120000 CAPSULE, DELAYED RELEASE PELLETS ORAL at 17:09

## 2018-06-25 RX ADMIN — FENOFIBRATE 145 MG: 145 TABLET, FILM COATED ORAL at 09:26

## 2018-06-25 RX ADMIN — HYDROMORPHONE HYDROCHLORIDE 0.2 MG: 1 INJECTION, SOLUTION INTRAMUSCULAR; INTRAVENOUS; SUBCUTANEOUS at 16:03

## 2018-06-25 RX ADMIN — OXYCODONE HYDROCHLORIDE 5 MG: 5 TABLET ORAL at 19:51

## 2018-06-25 RX ADMIN — HYDROMORPHONE HYDROCHLORIDE 0.2 MG: 1 INJECTION, SOLUTION INTRAMUSCULAR; INTRAVENOUS; SUBCUTANEOUS at 12:57

## 2018-06-25 RX ADMIN — NICOTINE 1 PATCH: 21 PATCH, EXTENDED RELEASE TRANSDERMAL at 09:31

## 2018-06-25 NOTE — PROGRESS NOTES
[facility] Progress Note  Patient: Geraldine Manzo 37 y o  male   MRN: [id number]  PCP: Rosalio Hernandes MD  Unit/Bed#: [facility] Encounter: [id number]  Date Of Visit: 06/25/18    Assessment:    Principal Problem:    Chronic pancreatitis ([facility] [address] )  Active Problems:    Chronic gastritis without bleeding      Plan:    # Abdominal pain - secondary to chronic pancreatitis/chronic pain syndrome  S/p ct a/p actually improved from prior admission    - Hx of chronic pancreatitis secondary to hypertriglyceridemia, complicated by pseudocyst   S/p recent nerve block end of May, appears to ineffective  - Repeated TG elevated from prior, he reports he is compliant with his meds and diet  - GI on board  - cont prn pain analgesics with bowel regimen, cont to titrate down IV dilaudid to 0 2mg q4 prn for severe pain, add oxy 5mg for moderate  - Advance diet to full liquid  - cont creon  - Cont IVF hydraiton  - cont supportive care    # Hypertriglyceridemia - Discussed with endocrinology already on appropriate medications, recommends increase of crestor to 40mg upon discharge, LFTs w/in nml range  Suspects pt is non compliant with low fat diet and recommends nutrition consult  - continue statin, niacin, fenofibrate  - educated on low-fat low-cholesterol diet; will have official nutrition consult  - Upon discharge increase crestor to 40mg    # Hyponatremia - secondary to poor po intake, cont IVF hydration    # Leukocytosis - likely reactive, no sign of acute infxn process, febrile    VTE Pharmacologic Prophylaxis:   Pharmacologic: Enoxaparin (Lovenox)  Mechanical VTE Prophylaxis in Place: Yes    Patient Centered Rounds: I have performed bedside rounds with nursing staff today  Discussions with Specialists or Other Care Team Provider:     Education and Discussions with Family / Patient:  Patient     Time Spent for Care: 30 minutes    More than 50% of total time spent on counseling and coordination of care as described above     Current Length of Stay: 3 day(s)    Current Patient Status: Inpatient   Certification Statement: The patient will continue to require additional inpatient hospital stay due to Chronic pancreatitis    Discharge Plan / Estimated Discharge Date: D/c planning 1-2 days    Code Status: Level 1 - Full Code      Subjective:   + abd pain though states improved, states this is the best he has felt since admission  Still requesting for IV dilaudid around the clock though  Afebrile, non toxic appearing    Objective:     Vitals:   Temp (24hrs), Av 6 °F (37 °C), Min:98 1 °F (36 7 °C), Max:99 °F (37 2 °C)    HR:  [] 85  Resp:  [18-20] 18  BP: (119-181)/(64-74) 119/64  SpO2:  [96 %-98 %] 96 %  Body mass index is 27 06 kg/m²  Input and Output Summary (last 24 hours): Intake/Output Summary (Last 24 hours) at 18 1147  Last data filed at 18 0511   Gross per 24 hour   Intake              960 ml   Output                0 ml   Net              960 ml       Physical Exam:     Physical Exam   Constitutional: He is oriented to person, place, and time  He appears well-developed  Neck: Neck supple  Cardiovascular: Normal rate, regular rhythm, normal heart sounds and intact distal pulses  Exam reveals no gallop and no friction rub  No murmur heard  Pulmonary/Chest: Effort normal and breath sounds normal  No respiratory distress  He has no wheezes  He has no rales  He exhibits no tenderness  Abdominal: Soft  Bowel sounds are normal  He exhibits no distension  There is tenderness  There is no rebound and no guarding  Musculoskeletal: Normal range of motion  He exhibits no edema, tenderness or deformity  Neurological: He is alert and oriented to person, place, and time  He has normal reflexes  No cranial nerve deficit  He exhibits normal muscle tone  Coordination normal    Skin: Skin is warm and dry       Additional Data:     Labs:      Results from last 7 days  Lab Units 18  0520 06/22/18  0518   WBC Thousand/uL 14 36* 13 56*   HEMOGLOBIN g/dL 14 5 15 0   HEMATOCRIT % 42 3 44 2   PLATELETS Thousands/uL 164 217   NEUTROS PCT %  --  71   LYMPHS PCT %  --  20   MONOS PCT %  --  7   EOS PCT %  --  2       Results from last 7 days  Lab Units 06/25/18  0446 06/23/18  0520   SODIUM mmol/L 135* 131*   POTASSIUM mmol/L 3 6 3 4*   CHLORIDE mmol/L 99* 96*   CO2 mmol/L 25 24   BUN mg/dL 9 5   CREATININE mg/dL 0 62 0 64   CALCIUM mg/dL 9 1 9 2   TOTAL PROTEIN g/dL  --  7 5   BILIRUBIN TOTAL mg/dL  --  1 90*   ALK PHOS U/L  --  60   ALT U/L  --  23   AST U/L  --  15   GLUCOSE RANDOM mg/dL 112 151*       Results from last 7 days  Lab Units 06/21/18  2208   INR  0 99       * I Have Reviewed All Lab Data Listed Above  * Additional Pertinent Lab Tests Reviewed:  All Labs Within Last 24 Hours Reviewed    Imaging:    Imaging Reports Reviewed Today Include:   Imaging Personally Reviewed by Myself Includes:      Recent Cultures (last 7 days):           Last 24 Hours Medication List:     Current Facility-Administered Medications:  atorvastatin 40 mg Oral Daily With [employer] , PA-C    docusate sodium 100 mg Oral BID Vergil Burns, DO    enoxaparin 40 mg Subcutaneous Daily Ernst Quiver, PA-C    famotidine 20 mg Oral BID Ernst Quiver, PA-C    fenofibrate 145 mg Oral Daily Vergil Burns, DO    fish oil 1,000 mg Oral BID Vergil Burns, DO    hydrALAZINE 10 mg Intravenous Q6H PRN Vergil Burns, DO    HYDROmorphone 0 2 mg Intravenous Q4H PRN Vergil Burns, DO    niacin 500 mg Oral Daily With Breakfast Vergil Burns, DO    nicotine 1 patch Transdermal Daily Ernst Quiver, PA-C    ondansetron 4 mg Intravenous Q6H PRN Ernst Quiver, PA-C    oxyCODONE 5 mg Oral Q4H PRN Vergil Burns, DO    pancrelipase (Lip-Prot-Amyl) 36,000 Units Oral TID AC Dinora Eloise Vila, PA-C    pantoprazole 40 mg Oral BID Ernst Quiver, PA-C    polyethylene glycol 17 g Oral Daily Abhijeet Burns DO promethazine 12 5 mg Intravenous Q6H PRN Schroeder Lav, DO    senna 1 tablet Oral HS Schroeder Lav, DO    sodium chloride 0 9 % with KCl 20 mEq/L 150 mL/hr Intravenous Continuous Schroeder Lav, DO Last Rate: 150 mL/hr (06/25/18 0507)   traZODone 50 mg Oral HS Kerrie Mullen PA-C      Facility-Administered Medications Ordered in Other Encounters:  oxyCODONE-acetaminophen 2 tablet Oral Once Jeremiah Murrell MD        Today, Patient Was Seen By: Elda Juarez DO    ** Please Note: This note has been constructed using a voice recognition system   **

## 2018-06-25 NOTE — PROGRESS NOTES
GI Progress Note - Vineet Ceja 37 y o  male MRN: [id number]    Unit/Bed#: -01 Encounter: [id number]    Subjective: He tolerated clear liquids so far as well as pudding  Still with epigastric pain after eating  No nausea or vomiting  He had a BM yesterday  Objective:     Vitals: Blood pressure 119/64, pulse 85, temperature 98 1 °F (36 7 °C), temperature source Oral, resp  rate 18, height 5' 11" (1 803 m), weight 88 kg (194 lb 0 1 oz), SpO2 96 %  ,Body mass index is 27 06 kg/m²        Intake/Output Summary (Last 24 hours) at 06/25/18 1552  Last data filed at 06/25/18 1257   Gross per 24 hour   Intake             1800 ml   Output              400 ml   Net             1400 ml       Physical Exam:     General Appearance: Alert, oriented x3, no acute distress  Lungs: CTA bilaterally, no respiratory distress  Heart: RRR, no murmur  Abdomen: Soft, non-distended, bowel sounds normoactive, (+) TTP in the epigastric region  Extremities: No cyanosis or LE edema    Invasive Devices     Peripheral Intravenous Line            Peripheral IV 06/21/18 Right Antecubital 4 days                Lab Results:    Results from last 7 days  Lab Units 06/23/18  0520 06/22/18  0518   WBC Thousand/uL 14 36* 13 56*   HEMOGLOBIN g/dL 14 5 15 0   HEMATOCRIT % 42 3 44 2   PLATELETS Thousands/uL 164 217   NEUTROS PCT %  --  71   LYMPHS PCT %  --  20   MONOS PCT %  --  7   EOS PCT %  --  2       Results from last 7 days  Lab Units 06/25/18  0446 06/23/18  0520   SODIUM mmol/L 135* 131*   POTASSIUM mmol/L 3 6 3 4*   CHLORIDE mmol/L 99* 96*   CO2 mmol/L 25 24   BUN mg/dL 9 5   CREATININE mg/dL 0 62 0 64   CALCIUM mg/dL 9 1 9 2   TOTAL PROTEIN g/dL  --  7 5   BILIRUBIN TOTAL mg/dL  --  1 90*   ALK PHOS U/L  --  60   ALT U/L  --  23   AST U/L  --  15   GLUCOSE RANDOM mg/dL 112 151*       Results from last 7 days  Lab Units 06/21/18  2208   INR  0 99       Results from last 7 days  Lab Units 06/25/18  0446   LIPASE u/L 314       Imaging Studies: I have personally reviewed pertinent imaging studies  Ct Abdomen Pelvis With Contrast  Result Date: 6/22/2018  Impression: Significant interval improvement in the gastric wall edema and fluid with mild residual remaining at the fundus and EG junction  The adjacent minimal fluid stranding about the pancreatic body and tail is also improved  Prior cholecystectomy   Findings are consistent with the preliminary report from [facility] which was provided shortly after completion of the exam        Assessment and Plan:       Acute on Chronic Pancreatitis  - CT A/P on admission with interval improvement of gastric wall edema and fluid with mild residual remaining fluid in the fundus and GE junction, minimal fluid stranding around pancreatic body improved  - TG elevated at 898 on admission  - Slowly improving; continue low fat diet  - SLIM to discuss with endocrine regarding further management of hypertriglyceridemia as he is already on multiple medications  - Antiemetics PRN  - Recheck TG tomorrow AM  - Smoking cessation  - Creon supplements TID AC  - Follow up with pain management for celiac plexus block  - Add LFTs and bilirubin to labwork today      The patient will be seen by Dr Marianna Waters

## 2018-06-26 VITALS
HEART RATE: 79 BPM | SYSTOLIC BLOOD PRESSURE: 124 MMHG | TEMPERATURE: 98.6 F | WEIGHT: 194 LBS | BODY MASS INDEX: 27.16 KG/M2 | OXYGEN SATURATION: 97 % | DIASTOLIC BLOOD PRESSURE: 67 MMHG | HEIGHT: 71 IN | RESPIRATION RATE: 16 BRPM

## 2018-06-26 LAB
ALBUMIN SERPL BCP-MCNC: 2.4 G/DL (ref 3.5–5)
ALP SERPL-CCNC: 84 U/L (ref 46–116)
ALT SERPL W P-5'-P-CCNC: 111 U/L (ref 12–78)
ANION GAP SERPL CALCULATED.3IONS-SCNC: 6 MMOL/L (ref 4–13)
AST SERPL W P-5'-P-CCNC: 101 U/L (ref 5–45)
BILIRUB SERPL-MCNC: 0.4 MG/DL (ref 0.2–1)
BUN SERPL-MCNC: 10 MG/DL (ref 5–25)
CALCIUM SERPL-MCNC: 8.7 MG/DL (ref 8.3–10.1)
CHLORIDE SERPL-SCNC: 102 MMOL/L (ref 100–108)
CO2 SERPL-SCNC: 26 MMOL/L (ref 21–32)
CREAT SERPL-MCNC: 0.6 MG/DL (ref 0.6–1.3)
ERYTHROCYTE [DISTWIDTH] IN BLOOD BY AUTOMATED COUNT: 12.3 % (ref 11.6–15.1)
GFR SERPL CREATININE-BSD FRML MDRD: 121 ML/MIN/1.73SQ M
GLUCOSE SERPL-MCNC: 103 MG/DL (ref 65–140)
HCT VFR BLD AUTO: 33.9 % (ref 36.5–49.3)
HGB BLD-MCNC: 11.4 G/DL (ref 12–17)
LIPASE SERPL-CCNC: 856 U/L (ref 73–393)
MCH RBC QN AUTO: 31.5 PG (ref 26.8–34.3)
MCHC RBC AUTO-ENTMCNC: 33.6 G/DL (ref 31.4–37.4)
MCV RBC AUTO: 94 FL (ref 82–98)
PLATELET # BLD AUTO: 153 THOUSANDS/UL (ref 149–390)
PMV BLD AUTO: 10.1 FL (ref 8.9–12.7)
POTASSIUM SERPL-SCNC: 3.7 MMOL/L (ref 3.5–5.3)
PROT SERPL-MCNC: 6.5 G/DL (ref 6.4–8.2)
RBC # BLD AUTO: 3.62 MILLION/UL (ref 3.88–5.62)
SODIUM SERPL-SCNC: 134 MMOL/L (ref 136–145)
TRIGL SERPL-MCNC: 258 MG/DL
WBC # BLD AUTO: 5.08 THOUSAND/UL (ref 4.31–10.16)

## 2018-06-26 PROCEDURE — 85027 COMPLETE CBC AUTOMATED: CPT | Performed by: INTERNAL MEDICINE

## 2018-06-26 PROCEDURE — 99239 HOSP IP/OBS DSCHRG MGMT >30: CPT | Performed by: INTERNAL MEDICINE

## 2018-06-26 PROCEDURE — 84478 ASSAY OF TRIGLYCERIDES: CPT | Performed by: PHYSICIAN ASSISTANT

## 2018-06-26 PROCEDURE — 80053 COMPREHEN METABOLIC PANEL: CPT | Performed by: PHYSICIAN ASSISTANT

## 2018-06-26 PROCEDURE — 83690 ASSAY OF LIPASE: CPT | Performed by: INTERNAL MEDICINE

## 2018-06-26 RX ORDER — ROSUVASTATIN CALCIUM 20 MG/1
40 TABLET, COATED ORAL DAILY
Qty: 30 TABLET | Refills: 0 | Status: SHIPPED | OUTPATIENT
Start: 2018-06-26 | End: 2018-06-26

## 2018-06-26 RX ORDER — NICOTINE 21 MG/24HR
1 PATCH, TRANSDERMAL 24 HOURS TRANSDERMAL DAILY
Qty: 28 PATCH | Refills: 0 | Status: ON HOLD | OUTPATIENT
Start: 2018-06-27 | End: 2018-09-11

## 2018-06-26 RX ORDER — OXYCODONE HYDROCHLORIDE 5 MG/1
5 TABLET ORAL EVERY 6 HOURS PRN
Qty: 16 TABLET | Refills: 0 | Status: SHIPPED | OUTPATIENT
Start: 2018-06-26 | End: 2018-06-26

## 2018-06-26 RX ORDER — ROSUVASTATIN CALCIUM 20 MG/1
40 TABLET, COATED ORAL DAILY
Qty: 30 TABLET | Refills: 0 | Status: SHIPPED | OUTPATIENT
Start: 2018-06-26 | End: 2018-08-09 | Stop reason: SDUPTHER

## 2018-06-26 RX ORDER — OXYCODONE HYDROCHLORIDE 5 MG/1
5 TABLET ORAL EVERY 6 HOURS PRN
Qty: 16 TABLET | Refills: 0 | Status: SHIPPED | OUTPATIENT
Start: 2018-06-26 | End: 2018-06-28 | Stop reason: SDUPTHER

## 2018-06-26 RX ADMIN — Medication 1000 MG: at 09:40

## 2018-06-26 RX ADMIN — PANTOPRAZOLE SODIUM 40 MG: 40 TABLET, DELAYED RELEASE ORAL at 04:59

## 2018-06-26 RX ADMIN — SODIUM CHLORIDE AND POTASSIUM CHLORIDE 150 ML/HR: .9; .15 SOLUTION INTRAVENOUS at 07:52

## 2018-06-26 RX ADMIN — ENOXAPARIN SODIUM 40 MG: 100 INJECTION SUBCUTANEOUS at 09:40

## 2018-06-26 RX ADMIN — FAMOTIDINE 20 MG: 20 TABLET ORAL at 09:41

## 2018-06-26 RX ADMIN — NICOTINE 1 PATCH: 21 PATCH, EXTENDED RELEASE TRANSDERMAL at 09:42

## 2018-06-26 RX ADMIN — PANCRELIPASE 36000 UNITS: 24000; 76000; 120000 CAPSULE, DELAYED RELEASE PELLETS ORAL at 09:45

## 2018-06-26 RX ADMIN — HYDROMORPHONE HYDROCHLORIDE 0.2 MG: 1 INJECTION, SOLUTION INTRAMUSCULAR; INTRAVENOUS; SUBCUTANEOUS at 03:31

## 2018-06-26 RX ADMIN — HYDROMORPHONE HYDROCHLORIDE 0.2 MG: 1 INJECTION, SOLUTION INTRAMUSCULAR; INTRAVENOUS; SUBCUTANEOUS at 12:29

## 2018-06-26 RX ADMIN — NIACIN 500 MG: 500 TABLET, FILM COATED, EXTENDED RELEASE ORAL at 09:43

## 2018-06-26 RX ADMIN — FENOFIBRATE 145 MG: 145 TABLET, FILM COATED ORAL at 09:43

## 2018-06-26 RX ADMIN — HYDROMORPHONE HYDROCHLORIDE 0.2 MG: 1 INJECTION, SOLUTION INTRAMUSCULAR; INTRAVENOUS; SUBCUTANEOUS at 08:02

## 2018-06-26 NOTE — PROGRESS NOTES
Discharge Summary - [facility] Internal Medicine    Patient Information: Toby Olivarez 37 y o  male MRN: [id number]  Unit/Bed#: -01 Encounter: [id number]    Discharging Physician / Practitioner: Lupe Lopez MD  PCP: Elizabeth Johnson MD  Admission Date: 6/21/2018  Discharge Date: 06/26/18    Disposition:     Home    Reason for Admission:  Abdominal pain/chronic pancreatitis    Discharge Diagnoses:     Principal Problem:    Chronic pancreatitis ([address] )  Active Problems:    Chronic gastritis without bleeding  Resolved Problems:    * No resolved hospital problems  *      Consultations During Hospital Stay:  · GI    Procedures Performed:     · None    Significant Findings / Test Results:   CT of the abdomen pelvis with contrast  Significant interval improvement in the gastric wall edema and fluid with mild residual remaining at the fundus and EG junction   The adjacent minimal fluid stranding about the pancreatic body and tail is also improved  Prior cholecystectomy    Incidental Findings:   · None    Test Results Pending at Discharge (will require follow up): · None     Outpatient Tests Requested:  · None    Complications:  None    Hospital Course:     Toby Olivarez is a 37 y o  male patient with past medical history of familial hypertriglyceridemia, history of severe pancreatitis complicated by pseudocyst requiring cyst gastrostomy and then further complicated by development of liver abscesses, chronic pancreatitis requiring splanchnic nerve blocks for pain control and Creon supplements, active tobacco abuse who originally presented to the hospital on 6/21/2018 due to worsening abdominal pain, nausea, vomiting  Patient had multiple admissions for chronic pancreatitis with recent one in May 2018  At that time he underwent endoscopy and found to have mild esophagitis at the GE junction otherwise normal appearing stomach and duodenum    Patient was discharged to have an Celiac plexus nerve block with his pain management doctor, he had it on May 29th after discharge, he notes that it worked for few days but then pain restarted again  Patient was admitted for chronic pancreatitis, managed with supportive care including IV fluids and pain management  Gastroenterology followed the patient as well  He does have history of hypertriglyceridemia contributing to acute on chronic pancreatitis  Patient's Crestor dose was increased to 40 mg at discharge  With pain management and supportive care patient clinically did well, noted improvement in his pain, tolerating diet well and was stable for discharge  He is discharged on 16 tablets of oxycodone, recommended to follow up with GI and his pain management doctor as outpatient  Condition at Discharge: stable     Discharge Day Visit / Exam:     Subjective:  Patient seen and examined  He notes his pain is slightly improved able to tolerate diet well  Vitals: Blood Pressure: 124/67 (06/26/18 0700)  Pulse: 79 (06/26/18 0700)  Temperature: 98 6 °F (37 °C) (06/26/18 0700)  Temp Source: Oral (06/26/18 0700)  Respirations: 16 (06/26/18 0700)  Height: 5' 11" (180 3 cm) (06/22/18 1440)  Weight - Scale: 88 kg (194 lb 0 1 oz) (06/22/18 1440)  SpO2: 97 % (06/26/18 0700)  Exam:   Physical Exam   Constitutional: He is oriented to person, place, and time  He appears well-developed and well-nourished  HENT:   Head: Normocephalic and atraumatic  Eyes: EOM are normal  Pupils are equal, round, and reactive to light  Neck: Normal range of motion  Neck supple  Cardiovascular: Normal rate and regular rhythm  Pulmonary/Chest: Effort normal and breath sounds normal    Abdominal: Soft  There is tenderness  Musculoskeletal: Normal range of motion  Neurological: He is alert and oriented to person, place, and time  Skin: Skin is warm and dry         Discussion with Family:  Discussed with patient in detail about the management plan  Discharge instructions/Information to patient and family:   See after visit summary for information provided to patient and family  Provisions for Follow-Up Care:  See after visit summary for information related to follow-up care and any pertinent home health orders  Planned Readmission:  None     Discharge Statement:  I spent 35 minutes discharging the patient  This time was spent on the day of discharge  I had direct contact with the patient on the day of discharge  Greater than 50% of the total time was spent examining patient, answering all patient questions, arranging and discussing plan of care with patient as well as directly providing post-discharge instructions  Additional time then spent on discharge activities  Discharge Medications:  See after visit summary for reconciled discharge medications provided to patient and family        ** Please Note: This note has been constructed using a voice recognition system **

## 2018-06-26 NOTE — PLAN OF CARE
Problem: DISCHARGE PLANNING - CARE MANAGEMENT  Goal: Discharge to post-acute care or home with appropriate resources  INTERVENTIONS:  - Conduct assessment to determine patient/family and health care team treatment goals, and need for post-acute services based on payer coverage, community resources, and patient preferences, and barriers to discharge  - Address psychosocial, clinical, and financial barriers to discharge as identified in assessment in conjunction with the patient/family and health care team  - Arrange appropriate level of post-acute services according to patient's   needs and preference and payer coverage in collaboration with the physician and health care team  - Communicate with and update the patient/family, physician, and health care team regarding progress on the discharge plan  - Arrange appropriate transportation to post-acute venues   Outcome: Completed Date Met: 06/26/18  CM met with pt at bedside  Pt to be discharged home w/no needs  He continues to refuse [facility] services  Wife Relda Jay will transport home

## 2018-06-26 NOTE — DISCHARGE SUMMARY
Discharge Summary - [facility] Internal Medicine    Patient Information: Richelle George 37 y o  male MRN: [id number]  Unit/Bed#: -01 Encounter: [id number]    Discharging Physician / Practitioner: Eliel Nunez MD  PCP: Wai Zavala MD  Admission Date: 6/21/2018  Discharge Date: 06/26/18    Disposition:     Home    Reason for Admission:  Abdominal pain/chronic pancreatitis    Discharge Diagnoses:     Principal Problem:    Chronic pancreatitis ([address] )  Active Problems:    Chronic gastritis without bleeding  Resolved Problems:    * No resolved hospital problems  *      Consultations During Hospital Stay:  · GI    Procedures Performed:     · None    Significant Findings / Test Results:   CT of the abdomen pelvis with contrast  Significant interval improvement in the gastric wall edema and fluid with mild residual remaining at the fundus and EG junction   The adjacent minimal fluid stranding about the pancreatic body and tail is also improved  Prior cholecystectomy    Incidental Findings:   · None    Test Results Pending at Discharge (will require follow up): · None     Outpatient Tests Requested:  · None    Complications:  None    Hospital Course:     Richelle George is a 37 y o  male patient with past medical history of familial hypertriglyceridemia, history of severe pancreatitis complicated by pseudocyst requiring cyst gastrostomy and then further complicated by development of liver abscesses, chronic pancreatitis requiring splanchnic nerve blocks for pain control and Creon supplements, active tobacco abuse who originally presented to the hospital on 6/21/2018 due to worsening abdominal pain, nausea, vomiting  Patient had multiple admissions for chronic pancreatitis with recent one in May 2018  At that time he underwent endoscopy and found to have mild esophagitis at the GE junction otherwise normal appearing stomach and duodenum    Patient was discharged to have an Celiac plexus nerve block with his pain management doctor, he had it on May 29th after discharge, he notes that it worked for few days but then pain restarted again  Patient was admitted for chronic pancreatitis, managed with supportive care including IV fluids and pain management  Gastroenterology followed the patient as well  He does have history of hypertriglyceridemia contributing to acute on chronic pancreatitis  Patient's Crestor dose was increased to 40 mg at discharge  With pain management and supportive care patient clinically did well, noted improvement in his pain, tolerating diet well and was stable for discharge  He is discharged on 16 tablets of oxycodone, recommended to follow up with GI and his pain management doctor as outpatient  Condition at Discharge: stable     Discharge Day Visit / Exam:     Subjective:  Patient seen and examined  He notes his pain is slightly improved able to tolerate diet well  Vitals: Blood Pressure: 124/67 (06/26/18 0700)  Pulse: 79 (06/26/18 0700)  Temperature: 98 6 °F (37 °C) (06/26/18 0700)  Temp Source: Oral (06/26/18 0700)  Respirations: 16 (06/26/18 0700)  Height: 5' 11" (180 3 cm) (06/22/18 1440)  Weight - Scale: 88 kg (194 lb 0 1 oz) (06/22/18 1440)  SpO2: 97 % (06/26/18 0700)  Exam:   Physical Exam   Constitutional: He is oriented to person, place, and time  He appears well-developed and well-nourished  HENT:   Head: Normocephalic and atraumatic  Eyes: EOM are normal  Pupils are equal, round, and reactive to light  Neck: Normal range of motion  Neck supple  Cardiovascular: Normal rate and regular rhythm  Pulmonary/Chest: Effort normal and breath sounds normal    Abdominal: Soft  There is tenderness  Musculoskeletal: Normal range of motion  Neurological: He is alert and oriented to person, place, and time  Skin: Skin is warm and dry         Discussion with Family:  Discussed with patient in detail about the management plan  Discharge instructions/Information to patient and family:   See after visit summary for information provided to patient and family  Provisions for Follow-Up Care:  See after visit summary for information related to follow-up care and any pertinent home health orders  Planned Readmission:  None     Discharge Statement:  I spent 35 minutes discharging the patient  This time was spent on the day of discharge  I had direct contact with the patient on the day of discharge  Greater than 50% of the total time was spent examining patient, answering all patient questions, arranging and discussing plan of care with patient as well as directly providing post-discharge instructions  Additional time then spent on discharge activities  Discharge Medications:  See after visit summary for reconciled discharge medications provided to patient and family        ** Please Note: This note has been constructed using a voice recognition system **

## 2018-06-26 NOTE — SOCIAL WORK
CM met with pt at bedside  Pt to be discharged home w/no needs  [facility]  Wife Kalyan Grimes will transport home

## 2018-06-27 ENCOUNTER — TRANSITIONAL CARE MANAGEMENT (OUTPATIENT)
Dept: FAMILY MEDICINE CLINIC | Facility: CLINIC | Age: 43
End: 2018-06-27

## 2018-06-27 NOTE — CASE MANAGEMENT
Notification of Discharge  This is a Notification of Discharge from our facility [address]  Please be advised that this patient has been discharge from our facility  Below you will find the admission and discharge date and time including the patients disposition  PRESENTATION DATE: 6/21/2018  9:20 PM  IP ADMISSION DATE: 6/22/18 0213  DISCHARGE DATE: 6/26/2018  4:04 PM  DISPOSITION: [id number] [facility] in the [place] for 2017  Network Utilization Review Department  Phone: [phone]; Fax [phone]  ATTENTION: The Network Utilization Review Department is now centralized for our 7 Facilities  Make a note that we have a new phone and fax numbers for our Department  Please call with any questions or concerns to [phone] and carefully follow the prompts so that you are directed to the right person  All voicemails are confidential  Fax any determinations, approvals, denials, and requests for initial or continue stay review clinical to [phone]  Due to HIGH CALL volume, it would be easier if you could please send faxed requests to expedite your requests and in part, help us provide discharge notifications faster

## 2018-06-28 ENCOUNTER — OFFICE VISIT (OUTPATIENT)
Dept: PAIN MEDICINE | Facility: CLINIC | Age: 43
End: 2018-06-28
Payer: COMMERCIAL

## 2018-06-28 VITALS — WEIGHT: 191 LBS | RESPIRATION RATE: 20 BRPM | HEIGHT: 71 IN | BODY MASS INDEX: 26.74 KG/M2 | HEART RATE: 78 BPM

## 2018-06-28 DIAGNOSIS — F11.20 UNCOMPLICATED OPIOID DEPENDENCE (HCC): ICD-10-CM

## 2018-06-28 DIAGNOSIS — G89.4 CHRONIC PAIN SYNDROME: Primary | ICD-10-CM

## 2018-06-28 DIAGNOSIS — Z79.891 LONG-TERM CURRENT USE OF OPIATE ANALGESIC: ICD-10-CM

## 2018-06-28 PROCEDURE — 99214 OFFICE O/P EST MOD 30 MIN: CPT | Performed by: ANESTHESIOLOGY

## 2018-06-28 PROCEDURE — 80305 DRUG TEST PRSMV DIR OPT OBS: CPT | Performed by: ANESTHESIOLOGY

## 2018-06-28 PROCEDURE — 1111F DSCHRG MED/CURRENT MED MERGE: CPT | Performed by: ANESTHESIOLOGY

## 2018-06-28 RX ORDER — OXYCODONE HYDROCHLORIDE 5 MG/1
5 TABLET ORAL 3 TIMES DAILY PRN
Qty: 90 TABLET | Refills: 0 | Status: ON HOLD | OUTPATIENT
Start: 2018-06-28 | End: 2018-09-17

## 2018-06-28 RX ORDER — PREGABALIN 75 MG/1
75 CAPSULE ORAL 2 TIMES DAILY
Qty: 60 CAPSULE | Refills: 0 | Status: SHIPPED | OUTPATIENT
Start: 2018-06-28 | End: 2018-07-26 | Stop reason: SDUPTHER

## 2018-06-28 NOTE — PROGRESS NOTES
Pt  C/o abdomen pain     Assessment:  1  Chronic pain syndrome  oxyCODONE (ROXICODONE) 5 mg immediate release tablet    pregabalin (LYRICA) 75 mg capsule   2  Uncomplicated opioid dependence ([address] )     3  Long-term current use of opiate analgesic       Plan:  The patient is a 75-year-old male who presents today with chronic abdominal pain  He is here today for follow-up office visit  Most recently, we performed bilateral splanchnic nerve block  Patient reports no relief of pain  He was admitted multiple times due to severe abdominal pain  Currently he was given a short-term prescription for oxycodone 5 mg p o  Q 6 hours  Patient reports good relief of pain by at least 50%  Patient denies aberrant behavior  Patient reports adequate pain relief  Patient continues to perform ADLs without difficulty  Patient denies adverse side effects  Considering patient has good relief of pain on low-dose opiate therapy  I am okay continuing this dosage  However, I will cut back to q 8 hours  As I am concerned regarding possible gastrointestinal effect, constipation associated with chronic opiate use  I will provide him with a prescription for oxycodone 5 mg p o  Q 8 hours p r n  Pain  In addition, I would add Lyrica 75 mg p o  B i d  To help with neuropathic component of his pain  Patient will be given a 1 month supply of oxycodone and Lyrica  I will see him back in 4 weeks for reassessment and medication refill  He verbalizes understanding  Opiate agreement was reviewed and signed  There are risks associated with opioid medications, including dependence, addiction and tolerance  The patient understands and agrees to use these medications only as prescribed  Potential side effects of the medications include, but are not limited to, constipation, drowsiness, addiction, impaired judgment and risk of fatal overdose if not taken as prescribed   The patient was warned against driving while taking sedation medications  Sharing medications is a felony  At this point in time, the patient is showing no signs of addiction, abuse, diversion or suicidal ideation  A urine drug screen was collected at today's office visit as part of our medication management protocol  The point of care testing results were appropriate for what was being prescribed  The specimen will be sent for confirmatory testing  The drug screen is medically necessary because the patient is either dependent on opioid medication or is being considered for opioid medication therapy and the results could impact ongoing or future treatment  The drug screen is to evaluate for the presences or absence of prescribed, non-prescribed, and/or illicit drugs/substances  [place] Prescription Drug Monitoring Program report was reviewed and was appropriate     I will give the injection another 4 weeks to see if it helps  If he continues to have pain, I will schedule him for a repeat Bilateral Splanchnic Nerve Block  My impressions and treatment recommendations were discussed in detail with the patient who verbalized understanding and had no further questions  Discharge instructions were provided  I personally saw and examined the patient and I agree with the above discussed plan of care  History of Present Illness:  Breanne Jenkins is a 37 y o  male who presents for a follow up office visit in regards to Abdominal Pain  The patients current symptoms include sharp shooting abdominal pain which is constant  He had a bilateral Splanchnic Nerve Block 5/29/18  He states that it has not helped with his pain  He was admitted multiple times to the hospital with complaints of abdominal pain and was given a short term prescription for oxycodone 5mg po q6hrs  He states that the medicine relieved his pain by at least 50%  He denies any side effects from opiates  He states that he would like to get a refill until his pain subsides   In addition he takes flexeril 10mg 3x/day  We have tried gabapentin in the past and patient states that it caused excessive sedation/drowsiness  Patient was given short term prescription for oxycodone 5mg upon discharge from the ER  Pain Contract Signed: 6/28/18, Last Urine Drug Screen: 6/28/18    I have personally reviewed and/or updated the patient's past medical history, past surgical history, family history, social history, current medications, allergies, and vital signs today  Review of Systems   Respiratory: Negative for shortness of breath  Cardiovascular: Negative for chest pain  Gastrointestinal: Negative for constipation, diarrhea, nausea and vomiting  Musculoskeletal: Negative for arthralgias, gait problem, joint swelling and myalgias  Skin: Negative for rash  Neurological: Negative for dizziness, seizures and weakness  All other systems reviewed and are negative  Patient Active Problem List   Diagnosis    Pancreatic lesion    Renal mass    Smoker    RBBB    Epigastric pain    Acute gastritis without hemorrhage    Abdominal pain    Elevated liver enzymes    Chronic pancreatitis (HCC)    Leukopenia    Hypertriglyceridemia    Chronic pain syndrome    Esophagitis    Other chronic pancreatitis (HCC)    Chronic gastritis without bleeding       Past Medical History:   Diagnosis Date    Asthma     Chronic pain disorder     GERD (gastroesophageal reflux disease)     Hyperlipidemia     Liver abscess     Meniscus tear     left knee work injury last assessed 08/24/2016    Pancreatitis     Pneumonia        Past Surgical History:   Procedure Laterality Date    CHOLECYSTECTOMY      ESOPHAGOGASTRODUODENOSCOPY N/A 11/16/2017    Procedure: ESOPHAGOGASTRODUODENOSCOPY (EGD); Surgeon: Kaitlynn Gould MD;  Location: [facility]; Service: Gastroenterology    ESOPHAGOGASTRODUODENOSCOPY N/A 4/19/2018    Procedure: ESOPHAGOGASTRODUODENOSCOPY (EGD); Surgeon: Blake Hermosillo MD;  [facility];   Service: Gastroenterology    ESOPHAGOGASTRODUODENOSCOPY N/A 5/10/2018    Procedure: ESOPHAGOGASTRODUODENOSCOPY (EGD); Surgeon: Al Tapia MD;  [facility];   Service: Gastroenterology    KNEE ARTHROSCOPY Bilateral     KNEE ARTHROSCOPY Right 2009    cibishino last assessed 08/24/2016   [facility] LIVER SURGERY      NERVE BLOCK Bilateral 5/29/2018    Procedure: SPLANCHNIC NERVE BLOCK;  Surgeon: Katia Wilson MD;  [facility];  Service: Pain Management     PANCREAS SURGERY      stents    UT INJECT NERV BLCK,CELIAC PLEXUS Bilateral 5/9/2017    Procedure: CELIAC PLEXUS BLOCK ;  Surgeon: Katia Wilson MD;  [facility];  Service: Pain Management     UT INJECT NERV BLCK,CELIAC PLEXUS Bilateral 6/1/2017    Procedure: SPLANCHNIC NERVE BLOCK at T12;  Surgeon: Katia Wilson MD;  [facility];  Service: Pain Management     UT INJECT NERV BLCK,CELIAC PLEXUS Bilateral 8/8/2017    Procedure: BILATERAL SPLANCHNIC NERVE BLOCK T12;  Surgeon: Katia Wilson MD;  [facility];  Service: Pain Management     UT INJECT NERV Nelli Pal Bilateral 12/28/2017    Procedure: SPLANCHNIC NERVE BLOCK;  Surgeon: Katia Wilson MD;  [facility];  Service: Pain Management     UT LAP,CHOLECYSTECTOMY N/A 2/14/2017    Procedure: LAPAROSCOPIC CHOLECYSTECTOMY, IOC, POSSIBLE OPEN ;  Surgeon: Hiro Worrell MD;  [facility];  Service: General    ROTATOR CUFF REPAIR Right     SHOULDER ARTHROSCOPY      SHOULDER ARTHROSCOPY Right        Family History   Problem Relation Age of Onset    Cirrhosis Mother     Heart disease Other         cardiac disorder    Cancer Other        Social History     Occupational History    fulltime employment      Social History Main Topics    Smoking status: Current Every Day Smoker     Packs/day: 0 50     Years: 20 00    Smokeless tobacco: Never Used    Alcohol use Yes      Comment: rarely, occasionally, history of no alcohol use per allscripts    Drug use: No    Sexual activity: Not on file       Current Outpatient Prescriptions on File Prior to Visit   Medication Sig    albuterol (PROVENTIL HFA,VENTOLIN HFA) 90 mcg/act inhaler Inhale 1-2 puffs every 4 (four) hours as needed for wheezing      Choline Fenofibrate (FENOFIBRIC ACID) 135 MG CPDR Take 1 capsule by mouth daily    cyclobenzaprine (FLEXERIL) 10 mg tablet Take 1 tablet (10 mg total) by mouth 3 (three) times a day as needed for muscle spasms for up to 30 days    EPINEPHrine (EPIPEN 2-ARIEL) 0 3 mg/0 3 mL SOAJ EpiPen 2-Ariel 0 3 MG/0 3ML Injection Solution Auto-injector  INJECT 0 3ML INTRAMUSCULARLY AS DIRECTED     Quantity: 1;  Refills: 1      Toro Briceño ; Active  2 Solution Auto-injector Pen    FLAXSEED, LINSEED, PO Take 1,000 mg by mouth 3 (three) times a day    niacin (NIASPAN) 500 mg CR tablet Take 1 tablet (500 mg total) by mouth daily with breakfast for 90 days    omega-3-acid ethyl esters (LOVAZA) 1 g capsule Take 2 capsules (2 g total) by mouth 2 (two) times a day for 90 days    ondansetron (ZOFRAN-ODT) 4 mg disintegrating tablet Take 1 tablet (4 mg total) by mouth every 8 (eight) hours as needed for nausea or vomiting    oxyCODONE (ROXICODONE) 5 mg immediate release tablet Take 1 tablet (5 mg total) by mouth every 6 (six) hours as needed for moderate pain for up to 4 days Max Daily Amount: 20 mg    pancrelipase, Lip-Prot-Amyl, (CREON) 12,000 units capsule Take 12,000 units of lipase by mouth 3 (three) times a day with meals      Pancrelipase, Lip-Prot-Amyl, (CREON) 51877 units CPEP Take 1 capsule (36,000 Units total) by mouth 3 (three) times a day before meals    pantoprazole (PROTONIX) 40 mg tablet Take 1 tablet (40 mg total) by mouth 2 (two) times a day for 90 days    ranitidine (ZANTAC) 150 MG capsule Take 150 mg by mouth 2 (two) times a day    rosuvastatin (CRESTOR) 20 MG tablet Take 2 tablets (40 mg total) by mouth daily    traZODone (DESYREL) 50 mg tablet Take 1 tablet (50 mg total) by mouth daily at bedtime    acetaminophen (TYLENOL) 325 mg tablet Take 2 tablets (650 mg total) by mouth every 6 (six) hours as needed for fever    docusate sodium (COLACE) 100 mg capsule Take 1 capsule (100 mg total) by mouth 2 (two) times a day as needed for constipation    nicotine (NICODERM CQ) 21 mg/24 hr TD 24 hr patch Place 1 patch on the skin daily    sucralfate (CARAFATE) 1 g tablet Take 1 tablet (1 g total) by mouth 4 (four) times a day for 7 days     Current Facility-Administered Medications on File Prior to Visit   Medication    oxyCODONE-acetaminophen (PERCOCET) 5-325 mg per tablet 2 tablet       Allergies   Allergen Reactions    Bee Venom Swelling       Physical Exam:    Pulse 78   Resp 20   Ht 5' 11" (1 803 m)   Wt 86 6 kg (191 lb)   BMI 26 64 kg/m²     Constitutional:normal, well developed, well nourished, alert, in no distress and non-toxic and no overt pain behavior    Eyes:anicteric  HEENT:grossly intact  Neck:supple, symmetric, trachea midline and no masses   Pulmonary:even and unlabored  Cardiovascular:No edema or pitting edema present  Skin:Normal without rashes or lesions and well hydrated  Psychiatric:Mood and affect appropriate  Neurologic:Cranial Nerves II-XII grossly intact  Musculoskeletal:normal    Imaging    '

## 2018-07-02 ENCOUNTER — TELEPHONE (OUTPATIENT)
Dept: PAIN MEDICINE | Facility: CLINIC | Age: 43
End: 2018-07-02

## 2018-07-06 ENCOUNTER — TELEPHONE (OUTPATIENT)
Dept: OBGYN CLINIC | Facility: HOSPITAL | Age: 43
End: 2018-07-06

## 2018-07-06 NOTE — TELEPHONE ENCOUNTER
[facility] wondering what the patients current work status is and if and when he would be able to return to work      Vamshi Dye pt

## 2018-07-16 DIAGNOSIS — J45.909 UNCOMPLICATED ASTHMA, UNSPECIFIED ASTHMA SEVERITY, UNSPECIFIED WHETHER PERSISTENT: Primary | ICD-10-CM

## 2018-07-19 DIAGNOSIS — E78.49 OTHER HYPERLIPIDEMIA: ICD-10-CM

## 2018-07-19 DIAGNOSIS — E78.2 MIXED HYPERLIPIDEMIA: ICD-10-CM

## 2018-07-25 RX ORDER — NIACIN 500 MG/1
TABLET, EXTENDED RELEASE ORAL
Qty: 30 TABLET | Refills: 2 | OUTPATIENT
Start: 2018-07-25

## 2018-07-26 ENCOUNTER — OFFICE VISIT (OUTPATIENT)
Dept: PAIN MEDICINE | Facility: CLINIC | Age: 43
End: 2018-07-26
Payer: COMMERCIAL

## 2018-07-26 VITALS
SYSTOLIC BLOOD PRESSURE: 136 MMHG | BODY MASS INDEX: 26.74 KG/M2 | HEIGHT: 71 IN | RESPIRATION RATE: 16 BRPM | WEIGHT: 191 LBS | DIASTOLIC BLOOD PRESSURE: 98 MMHG | HEART RATE: 82 BPM

## 2018-07-26 DIAGNOSIS — F11.20 UNCOMPLICATED OPIOID DEPENDENCE (HCC): ICD-10-CM

## 2018-07-26 DIAGNOSIS — K86.1 OTHER CHRONIC PANCREATITIS (HCC): ICD-10-CM

## 2018-07-26 DIAGNOSIS — G89.4 CHRONIC PAIN SYNDROME: Primary | ICD-10-CM

## 2018-07-26 PROCEDURE — 99214 OFFICE O/P EST MOD 30 MIN: CPT | Performed by: ANESTHESIOLOGY

## 2018-07-26 RX ORDER — PREGABALIN 75 MG/1
75 CAPSULE ORAL 3 TIMES DAILY
Qty: 90 CAPSULE | Refills: 1 | Status: SHIPPED | OUTPATIENT
Start: 2018-07-26 | End: 2018-11-08 | Stop reason: DRUGHIGH

## 2018-07-26 NOTE — PROGRESS NOTES
Assessment:  1  Chronic pain syndrome     2  Uncomplicated opioid dependence ([facility] [place] [address] )     3  Other chronic pancreatitis ([facility] [place] [address] )           Plan: This is a 47-year-old male who presents today for return visit for management of chronic abdominal pain secondary to chronic pancreatitis  At his last visit, we started low-dose opiates oxycodone 5 mg p o  T i d  In addition to Lyrica 75 mg p o  B i d   Patient reports decent relief with Lyrica  He states that he would like to come off oxycodone  At this time, I recommend a slow taper off oxycodone  Patient reports having 12 tablets of oxycodone left over  I advised patient to take 1 tablet daily over the course of the next 12 days until he is out completely of the medication  Regarding Lyrica dosage, I recommend increasing the dose of Lyrica to 75mg po TID  I informed patient that if he has any significant side effects such as drowsiness or sedation, he should let us know and not discontinue the medication abruptly  There are risks associated with opioid medications, including dependence, addiction and tolerance  The patient understands and agrees to use these medications only as prescribed  Potential side effects of the medications include, but are not limited to, constipation, drowsiness, addiction, impaired judgment and risk of fatal overdose if not taken as prescribed  The patient was warned against driving while taking sedation medications  Sharing medications is a felony  At this point in time, the patient is showing no signs of addiction, abuse, diversion or suicidal ideation  [place] Prescription Drug Monitoring Program report was reviewed and was appropriate     Patient states that he would like to return back to work Mid-August after he is off the oxycodone completely  My impressions and treatment recommendations were discussed in detail with the patient who verbalized understanding and had no further questions    Discharge instructions were provided  I personally saw and examined the patient and I agree with the above discussed plan of care  History of Present Illness:  Greg Freedman is a 37 y o  male who presents for a follow up office visit in regards to Abdominal Pain  The patients current symptoms include abdominal pain from chronic pancreatitis  He had a bilateral splanchnic nerve block 2 months ago  He states that the procedure helped relieve his pain for 8 weeks  He states that he feels that the pain is slowly returning  He was started on lyrica 75mg po BID  He states that the lyrica is helping  He also is alos on oxycodone 5mg po TID which he states takes the edge off his pain  His last dose of oxycodone 5mg was last night  Patients states that he would like to come off oxycodone 5mg  He states that he has approximately 12 oxycodone 5mg tablets left over  Current pain medications includes:  Oxycodone 5 mg p o  t i d   The patient reports that this regimen is providing 60% pain relief  The patient is reporting no side effects from this pain medication regimen  Pain Contract Signed: 6/28/18  UDS 6/28/18    I have personally reviewed and/or updated the patient's past medical history, past surgical history, family history, social history, current medications, allergies, and vital signs today  Review of Systems   Respiratory: Negative for shortness of breath  Cardiovascular: Negative for chest pain  Gastrointestinal: Negative for constipation, diarrhea, nausea and vomiting  Musculoskeletal: Negative for arthralgias, gait problem, joint swelling and myalgias  Skin: Negative for rash  Neurological: Negative for dizziness, seizures and weakness  All other systems reviewed and are negative        Patient Active Problem List   Diagnosis    Pancreatic lesion    Renal mass    Smoker    RBBB    Epigastric pain    Acute gastritis without hemorrhage    Abdominal pain    Elevated liver enzymes    [facility] [address] [facility]    Leukopenia    Hypertriglyceridemia    Chronic pain syndrome    Esophagitis    Other chronic pancreatitis (HCC)    Chronic gastritis without bleeding    Uncomplicated opioid dependence ([facility] [address] )    Long-term current use of opiate analgesic       Past Medical History:   Diagnosis Date    Asthma     Chronic pain disorder     GERD (gastroesophageal reflux disease)     Hyperlipidemia     Liver abscess     Meniscus tear     left knee work injury last assessed 08/24/2016    Pancreatitis     Pneumonia        Past Surgical History:   Procedure Laterality Date    CHOLECYSTECTOMY      ESOPHAGOGASTRODUODENOSCOPY N/A 11/16/2017    Procedure: ESOPHAGOGASTRODUODENOSCOPY (EGD); Surgeon: Tere Brenner MD;  [facility]; Service: Gastroenterology    ESOPHAGOGASTRODUODENOSCOPY N/A 4/19/2018    Procedure: ESOPHAGOGASTRODUODENOSCOPY (EGD); Surgeon: Julian Ge MD;  [facility]; Service: Gastroenterology    ESOPHAGOGASTRODUODENOSCOPY N/A 5/10/2018    Procedure: ESOPHAGOGASTRODUODENOSCOPY (EGD); Surgeon: Ileana Owens MD;  [facility];   Service: Gastroenterology    KNEE ARTHROSCOPY Bilateral     KNEE ARTHROSCOPY Right 2009    cibishino last assessed 08/24/2016    LIVER SURGERY      NERVE BLOCK Bilateral 5/29/2018    Procedure: SPLANCHNIC NERVE BLOCK;  Surgeon: Armando Bee MD;  [facility];  Service: Pain Management     PANCREAS SURGERY      stents    MD INJECT NERV BLCK,CELIAC PLEXUS Bilateral 5/9/2017    Procedure: CELIAC PLEXUS BLOCK ;  Surgeon: Armando Bee MD;  [facility];  Service: Pain Management     MD INJECT NERV BLCK,CELIAC PLEXUS Bilateral 6/1/2017    Procedure: SPLANCHNIC NERVE BLOCK at T12;  Surgeon: Armando Bee MD;  [facility];  Service: Pain Management     MD INJECT NERV BLCK,CELIAC PLEXUS Bilateral 8/8/2017    Procedure: BILATERAL SPLANCHNIC NERVE BLOCK T12;  Surgeon: Armando Bee MD;  [facility];  Service: Pain Management     CA INJECT NERV BLCK,PARAVERT SYMPATH Bilateral 12/28/2017    Procedure: SPLANCHNIC NERVE BLOCK;  Surgeon: Arelis Morales MD;  [facility];  Service: Pain Management     CA LAP,CHOLECYSTECTOMY N/A 2/14/2017    Procedure: LAPAROSCOPIC CHOLECYSTECTOMY, IOC, POSSIBLE OPEN ;  Surgeon: Regis Billy MD;  [facility];  Service: General    ROTATOR CUFF REPAIR Right     SHOULDER ARTHROSCOPY      SHOULDER ARTHROSCOPY Right        Family History   Problem Relation Age of Onset    Cirrhosis Mother     Heart disease Other         cardiac disorder    Cancer Other        Social History     Occupational History    fulltime employment      Social History Main Topics    Smoking status: Current Every Day Smoker     Packs/day: 0 50     Years: 20 00    Smokeless tobacco: Never Used    Alcohol use Yes      Comment: rarely, occasionally, history of no alcohol use per allscripts    Drug use: No    Sexual activity: Not on file       Current Outpatient Prescriptions on File Prior to Visit   Medication Sig    acetaminophen (TYLENOL) 325 mg tablet Take 2 tablets (650 mg total) by mouth every 6 (six) hours as needed for fever    Choline Fenofibrate (FENOFIBRIC ACID) 135 MG CPDR Take 1 capsule by mouth daily    docusate sodium (COLACE) 100 mg capsule Take 1 capsule (100 mg total) by mouth 2 (two) times a day as needed for constipation    EPINEPHrine (EPIPEN 2-LARRY) 0 3 mg/0 3 mL SOAJ EpiPen 2-Larry 0 3 MG/0 3ML Injection Solution Auto-injector  INJECT 0 3ML INTRAMUSCULARLY AS DIRECTED     Quantity: 1;  Refills: 1      Toro Briceño ; Active  2 Solution Auto-injector Pen    FLAXSEED, LINSEED, PO Take 1,000 mg by mouth 3 (three) times a day    niacin (NIASPAN) 500 mg CR tablet Take 1 tablet (500 mg total) by mouth daily with breakfast for 90 days    nicotine (NICODERM CQ) 21 mg/24 hr TD 24 hr patch Place 1 patch on the skin daily    ondansetron (ZOFRAN-ODT) 4 mg disintegrating tablet Take 1 tablet (4 mg total) by mouth every 8 (eight) hours as needed for nausea or vomiting    oxyCODONE (ROXICODONE) 5 mg immediate release tablet Take 1 tablet (5 mg total) by mouth 3 (three) times a day as needed for moderate pain for up to 30 days Max Daily Amount: 15 mg    pancrelipase, Lip-Prot-Amyl, (CREON) 12,000 units capsule Take 12,000 units of lipase by mouth 3 (three) times a day with meals      Pancrelipase, Lip-Prot-Amyl, (CREON) 41906 units CPEP Take 1 capsule (36,000 Units total) by mouth 3 (three) times a day before meals    pantoprazole (PROTONIX) 40 mg tablet Take 1 tablet (40 mg total) by mouth 2 (two) times a day for 90 days    pregabalin (LYRICA) 75 mg capsule Take 1 capsule (75 mg total) by mouth 2 (two) times a day for 30 days    ranitidine (ZANTAC) 150 MG capsule Take 150 mg by mouth 2 (two) times a day    rosuvastatin (CRESTOR) 20 MG tablet Take 2 tablets (40 mg total) by mouth daily    traZODone (DESYREL) 50 mg tablet Take 1 tablet (50 mg total) by mouth daily at bedtime    VENTOLIN  (90 Base) MCG/ACT inhaler INHALE 1 TO 2 PUFFS BY MOUTH EVERY 4 TO 6 HOURS as needed     cyclobenzaprine (FLEXERIL) 10 mg tablet Take 1 tablet (10 mg total) by mouth 3 (three) times a day as needed for muscle spasms for up to 30 days    omega-3-acid ethyl esters (LOVAZA) 1 g capsule Take 2 capsules (2 g total) by mouth 2 (two) times a day for 90 days    sucralfate (CARAFATE) 1 g tablet Take 1 tablet (1 g total) by mouth 4 (four) times a day for 7 days     Current Facility-Administered Medications on File Prior to Visit   Medication    oxyCODONE-acetaminophen (PERCOCET) 5-325 mg per tablet 2 tablet       Allergies   Allergen Reactions    Bee Venom Swelling       Physical Exam:    /98   Pulse 82   Resp 16   Ht 5' 11" (1 803 m)   Wt 86 6 kg (191 lb)   BMI 26 64 kg/m²     Constitutional:normal, well developed, well nourished, alert, in no distress and non-toxic and no overt pain behavior    Eyes:anicteric  HEENT:grossly intact  Neck:supple, symmetric, trachea midline and no masses   Pulmonary:even and unlabored  Cardiovascular:No edema or pitting edema present  Skin:Normal without rashes or lesions and well hydrated  Psychiatric:Mood and affect appropriate  Neurologic:Cranial Nerves II-XII grossly intact  Musculoskeletal:normal    Imaging

## 2018-08-01 ENCOUNTER — TELEPHONE (OUTPATIENT)
Dept: FAMILY MEDICINE CLINIC | Facility: CLINIC | Age: 43
End: 2018-08-01

## 2018-08-01 NOTE — TELEPHONE ENCOUNTER
Pts wife called the pt need to have repeat labs done again  Please create the order  I will call the wife when its ready   Pt goes to [facility]

## 2018-08-06 DIAGNOSIS — K86.1 CHRONIC PANCREATITIS, UNSPECIFIED PANCREATITIS TYPE (HCC): ICD-10-CM

## 2018-08-06 DIAGNOSIS — E78.2 MIXED HYPERLIPIDEMIA: Primary | ICD-10-CM

## 2018-08-06 DIAGNOSIS — Z11.4 SCREENING FOR HIV (HUMAN IMMUNODEFICIENCY VIRUS): ICD-10-CM

## 2018-08-09 ENCOUNTER — TELEPHONE (OUTPATIENT)
Dept: PAIN MEDICINE | Facility: CLINIC | Age: 43
End: 2018-08-09

## 2018-08-09 ENCOUNTER — OFFICE VISIT (OUTPATIENT)
Dept: FAMILY MEDICINE CLINIC | Facility: CLINIC | Age: 43
End: 2018-08-09
Payer: COMMERCIAL

## 2018-08-09 VITALS
BODY MASS INDEX: 28.56 KG/M2 | DIASTOLIC BLOOD PRESSURE: 72 MMHG | WEIGHT: 204 LBS | RESPIRATION RATE: 18 BRPM | OXYGEN SATURATION: 96 % | TEMPERATURE: 98.5 F | SYSTOLIC BLOOD PRESSURE: 120 MMHG | HEART RATE: 106 BPM | HEIGHT: 71 IN

## 2018-08-09 DIAGNOSIS — E78.2 MIXED HYPERLIPIDEMIA: ICD-10-CM

## 2018-08-09 DIAGNOSIS — G89.4 CHRONIC PAIN SYNDROME: ICD-10-CM

## 2018-08-09 DIAGNOSIS — K86.1 IDIOPATHIC CHRONIC PANCREATITIS (HCC): Primary | ICD-10-CM

## 2018-08-09 PROCEDURE — 99214 OFFICE O/P EST MOD 30 MIN: CPT | Performed by: FAMILY MEDICINE

## 2018-08-09 RX ORDER — ROSUVASTATIN CALCIUM 40 MG/1
40 TABLET, COATED ORAL DAILY
Qty: 90 TABLET | Refills: 1 | Status: SHIPPED | OUTPATIENT
Start: 2018-08-09 | End: 2019-01-10 | Stop reason: SDUPTHER

## 2018-08-09 NOTE — PROGRESS NOTES
Assessment/Plan:         Diagnoses and all orders for this visit:    Idiopathic chronic pancreatitis (HCC)    Mixed hyperlipidemia  -     rosuvastatin (CRESTOR) 40 MG tablet; Take 1 tablet (40 mg total) by mouth daily    Chronic pain syndrome         chronic pancreatitis/ chronic pain syndrome  Stable, currently under care both GI pain management encourage continue diet modification to continue current medicines as prescribed no changes made  Hyperlipidemia  Stable to continue current medicines as listed reviewed with patient, no issues concerns will obtain updated lipid panel      Subjective:      Patient ID: Cinda Alexis is a 37 y o  male  F/u   Did not go for the labs yet   Has been to pain management [facility], place on lyrica for the chronic pancreatitis, on other meds strictly as needed,   Has been gaining weight   Has been weaning off the oxy , looks forward to returning to work, once off the meds completely     Still lifting the Lab , up  The stairs 10yrs old     Asthma currently taking the alb , rare and only as needs , feels most likely related to pneumonia of years ago Has not had any recent exacerbation, nonsmoker    Chol   Continues to take the crestor at hosp was increased to 40 mg , continues on the niaspan 500mg qd , fenofibrate   has been watching diet lot of chicken staying away from fast foods, no alcohol,  No issues concerns in regards to medications    Chronic pancreatitis   continues on creon with meals and as directed    has been watching diet fairly controlled in regards to pain working with pain control  /pain management        The following portions of the patient's history were reviewed and updated as appropriate:   He has a past medical history of Asthma; Chronic pain disorder; GERD (gastroesophageal reflux disease); Hyperlipidemia; Liver abscess; Meniscus tear; Pancreatitis; and Pneumonia ,   does not have any pertinent problems on file  ,   has a past surgical history that includes Rotator cuff repair (Right); Pancreas surgery; Cholecystectomy; pr lap,cholecystectomy (N/A, 2/14/2017); Liver surgery; pr inject nerv blck,celiac plexus (Bilateral, 5/9/2017); pr inject nerv blck,celiac plexus (Bilateral, 6/1/2017); pr inject nerv blck,celiac plexus (Bilateral, 8/8/2017); Esophagogastroduodenoscopy (N/A, 11/16/2017); pr inject nerv blck,paravert sympath (Bilateral, 12/28/2017); Knee arthroscopy (Bilateral); Shoulder arthroscopy; Shoulder arthroscopy (Right); Esophagogastroduodenoscopy (N/A, 4/19/2018); Esophagogastroduodenoscopy (N/A, 5/10/2018); Knee arthroscopy (Right, 2009); and NERVE BLOCK (Bilateral, 5/29/2018)  ,  family history includes Cancer in his other; Cirrhosis in his mother; Heart disease in his other  ,   reports that he has been smoking  He has a 10 00 pack-year smoking history  He has never used smokeless tobacco  He reports that he drinks alcohol  He reports that he does not use drugs  ,  is allergic to bee venom         Review of Systems   Constitutional: Negative for appetite change, chills, fever and unexpected weight change  HENT: Negative for congestion, dental problem, ear pain, hearing loss, postnasal drip, rhinorrhea, sinus pain, sinus pressure, sneezing, sore throat, tinnitus and voice change  Eyes: Negative for visual disturbance  Respiratory: Negative for apnea, cough, chest tightness and shortness of breath  Cardiovascular: Negative for chest pain, palpitations and leg swelling  Gastrointestinal: Negative for abdominal pain, blood in stool, constipation, diarrhea, nausea and vomiting  Endocrine: Negative for cold intolerance, heat intolerance, polydipsia, polyphagia and polyuria  Genitourinary: Negative for decreased urine volume, difficulty urinating, dysuria, frequency and hematuria  Musculoskeletal: Negative for arthralgias, back pain, gait problem, joint swelling and myalgias  Skin: Negative for color change, rash and wound     Allergic/Immunologic: Negative for environmental allergies and food allergies  Neurological: Negative for dizziness, syncope, weakness, light-headedness, numbness and headaches  Hematological: Negative for adenopathy  Does not bruise/bleed easily  Psychiatric/Behavioral: Negative for sleep disturbance and suicidal ideas  The patient is not nervous/anxious  Objective:  Vitals:    08/09/18 0842   BP: 120/72   Pulse: (!) 106   Resp: 18   Temp: 98 5 °F (36 9 °C)   SpO2: 96%      Physical Exam   Constitutional: He is oriented to person, place, and time  He appears well-developed and well-nourished  HENT:   Head: Normocephalic and atraumatic  Eyes: EOM are normal    Neck: Normal range of motion  Neck supple  Cardiovascular: Normal rate, regular rhythm and normal heart sounds  Pulmonary/Chest: Effort normal and breath sounds normal    Abdominal: Soft  Bowel sounds are normal    Musculoskeletal: Normal range of motion  Neurological: He is alert and oriented to person, place, and time  Skin: Skin is warm and dry  Psychiatric: He has a normal mood and affect   His behavior is normal  Judgment and thought content normal

## 2018-08-14 ENCOUNTER — TELEPHONE (OUTPATIENT)
Dept: PAIN MEDICINE | Facility: CLINIC | Age: 43
End: 2018-08-14

## 2018-08-14 NOTE — TELEPHONE ENCOUNTER
PT stopped in to see if his med recs have been sent to the [address] [place] [address]  I sent Gilda Das an email  Once I get a response from Gilda Das, I will let the pt know

## 2018-08-15 LAB
ALBUMIN SERPL-MCNC: 3.9 G/DL (ref 3.6–5.1)
ALBUMIN/GLOB SERPL: 1.6 (CALC) (ref 1–2.5)
ALP SERPL-CCNC: 147 U/L (ref 40–115)
ALT SERPL-CCNC: 347 U/L (ref 9–46)
AMYLASE SERPL-CCNC: 25 U/L (ref 21–101)
AST SERPL-CCNC: 301 U/L (ref 10–40)
BASOPHILS # BLD AUTO: 30 CELLS/UL (ref 0–200)
BASOPHILS NFR BLD AUTO: 0.9 %
BILIRUB SERPL-MCNC: 1.2 MG/DL (ref 0.2–1.2)
BUN SERPL-MCNC: 8 MG/DL (ref 7–25)
BUN/CREAT SERPL: ABNORMAL (CALC) (ref 6–22)
CALCIUM SERPL-MCNC: 8.8 MG/DL (ref 8.6–10.3)
CHLORIDE SERPL-SCNC: 105 MMOL/L (ref 98–110)
CHOLEST SERPL-MCNC: 245 MG/DL
CHOLEST/HDLC SERPL: 18.8 (CALC)
CO2 SERPL-SCNC: 25 MMOL/L (ref 20–32)
CREAT SERPL-MCNC: 0.72 MG/DL (ref 0.6–1.35)
EOSINOPHIL # BLD AUTO: 79 CELLS/UL (ref 15–500)
EOSINOPHIL NFR BLD AUTO: 2.4 %
ERYTHROCYTE [DISTWIDTH] IN BLOOD BY AUTOMATED COUNT: 15.4 % (ref 11–15)
GLOBULIN SER CALC-MCNC: 2.5 G/DL (CALC) (ref 1.9–3.7)
GLUCOSE SERPL-MCNC: 126 MG/DL (ref 65–99)
HCT VFR BLD AUTO: 43 % (ref 38.5–50)
HDLC SERPL-MCNC: 13 MG/DL
HGB BLD-MCNC: 14.5 G/DL (ref 13.2–17.1)
HIV 1+2 AB+HIV1 P24 AG SERPL QL IA: NORMAL
LIPASE SERPL-CCNC: 26 U/L (ref 7–60)
LYMPHOCYTES # BLD AUTO: 1304 CELLS/UL (ref 850–3900)
LYMPHOCYTES NFR BLD AUTO: 39.5 %
MCH RBC QN AUTO: 31.5 PG (ref 27–33)
MCHC RBC AUTO-ENTMCNC: 33.7 G/DL (ref 32–36)
MCV RBC AUTO: 93.5 FL (ref 80–100)
MONOCYTES # BLD AUTO: 347 CELLS/UL (ref 200–950)
MONOCYTES NFR BLD AUTO: 10.5 %
NEUTROPHILS # BLD AUTO: 1541 CELLS/UL (ref 1500–7800)
NEUTROPHILS NFR BLD AUTO: 46.7 %
NONHDLC SERPL-MCNC: 232 MG/DL (CALC)
PLATELET # BLD AUTO: 120 THOUSAND/UL (ref 140–400)
PMV BLD REES-ECKER: 11.2 FL (ref 7.5–12.5)
POTASSIUM SERPL-SCNC: 4.1 MMOL/L (ref 3.5–5.3)
PROT SERPL-MCNC: 6.4 G/DL (ref 6.1–8.1)
RBC # BLD AUTO: 4.6 MILLION/UL (ref 4.2–5.8)
SL AMB EGFR AFRICAN AMERICAN: 131 ML/MIN/1.73M2
SL AMB EGFR NON AFRICAN AMERICAN: 113 ML/MIN/1.73M2
SODIUM SERPL-SCNC: 139 MMOL/L (ref 135–146)
TRIGL SERPL-MCNC: 746 MG/DL
WBC # BLD AUTO: 3.3 THOUSAND/UL (ref 3.8–10.8)

## 2018-08-18 DIAGNOSIS — E78.2 MIXED HYPERLIPIDEMIA: ICD-10-CM

## 2018-08-18 DIAGNOSIS — E78.49 OTHER HYPERLIPIDEMIA: ICD-10-CM

## 2018-08-20 RX ORDER — NIACIN 500 MG/1
TABLET, EXTENDED RELEASE ORAL
Qty: 30 TABLET | Refills: 2 | Status: SHIPPED | OUTPATIENT
Start: 2018-08-20 | End: 2018-11-12

## 2018-08-30 DIAGNOSIS — G47.00 INSOMNIA, UNSPECIFIED TYPE: ICD-10-CM

## 2018-09-01 NOTE — SOCIAL WORK
CM met with pt at bedside  CM reviewed obs notification  Pt was agreeable and signed  CM provided copy and placed original in medical records  CM reviewed discharge planning process including the following: identifying help at home, patient preference for discharge planning needs, pharmacy preference, and availability of treatment team to discuss questions or concerns patient and/or family may have regarding understanding medications and recognizing signs and symptoms once discharged  CM also encouraged patient to follow up with all recommended appointments after discharge  Patient advised of importance for patient and family to participate in managing patients medical well being  CM name and role reviewed and Discharge Checklist provided  Encouraged patient and caregiver to review prior to discharge  Principal Discharge DX:	Left knee pain

## 2018-09-04 ENCOUNTER — TELEPHONE (OUTPATIENT)
Dept: PAIN MEDICINE | Facility: MEDICAL CENTER | Age: 43
End: 2018-09-04

## 2018-09-04 RX ORDER — ONDANSETRON 4 MG/1
TABLET, ORALLY DISINTEGRATING ORAL
Qty: 20 TABLET | Refills: 0 | Status: SHIPPED | OUTPATIENT
Start: 2018-09-04 | End: 2018-10-26 | Stop reason: SDUPTHER

## 2018-09-04 NOTE — TELEPHONE ENCOUNTER
Patients wife called and LM on anserve @ 154pm requesting to move up appt sooner  Appt has been r/s to 09/17/18 as patient is having a lot of pain  Patients wife would like to know if patient can be seen sooner or if there is something that the patient can do to help with the pain   Stated patient returned to work but is really having a lot of trouble (medical condition) brenda bah [phone]

## 2018-09-05 NOTE — TELEPHONE ENCOUNTER
Do you want to see this patient in a same day spot or have him wait til 9/17, which was your first available?

## 2018-09-06 NOTE — TELEPHONE ENCOUNTER
S/w pt's wife and advised the same  She states pt has enough to get him to 9/17 ov  Pt will call [facility] prior to ov if experiencing side effects or no improvement in pain

## 2018-09-08 DIAGNOSIS — E78.2 MIXED HYPERLIPIDEMIA: ICD-10-CM

## 2018-09-10 ENCOUNTER — HOSPITAL ENCOUNTER (INPATIENT)
Facility: HOSPITAL | Age: 43
LOS: 6 days | Discharge: HOME | DRG: 438 | End: 2018-09-17
Attending: EMERGENCY MEDICINE | Admitting: INTERNAL MEDICINE
Payer: COMMERCIAL

## 2018-09-10 DIAGNOSIS — K86.1 ACUTE ON CHRONIC PANCREATITIS (HCC): Primary | ICD-10-CM

## 2018-09-10 DIAGNOSIS — G89.4 CHRONIC PAIN SYNDROME: ICD-10-CM

## 2018-09-10 DIAGNOSIS — K85.90 ACUTE ON CHRONIC PANCREATITIS (HCC): Primary | ICD-10-CM

## 2018-09-10 RX ORDER — ROSUVASTATIN CALCIUM 20 MG/1
TABLET, COATED ORAL
Qty: 90 TABLET | Refills: 0 | Status: ON HOLD | OUTPATIENT
Start: 2018-09-10 | End: 2018-09-11

## 2018-09-10 RX ORDER — ONDANSETRON 2 MG/ML
4 INJECTION INTRAMUSCULAR; INTRAVENOUS ONCE
Status: COMPLETED | OUTPATIENT
Start: 2018-09-11 | End: 2018-09-11

## 2018-09-11 ENCOUNTER — APPOINTMENT (INPATIENT)
Dept: RADIOLOGY | Facility: HOSPITAL | Age: 43
DRG: 438 | End: 2018-09-11
Payer: COMMERCIAL

## 2018-09-11 ENCOUNTER — APPOINTMENT (EMERGENCY)
Dept: CT IMAGING | Facility: HOSPITAL | Age: 43
DRG: 438 | End: 2018-09-11
Payer: COMMERCIAL

## 2018-09-11 PROBLEM — K21.9 GERD (GASTROESOPHAGEAL REFLUX DISEASE): Status: ACTIVE | Noted: 2018-09-11

## 2018-09-11 PROBLEM — K85.90 ACUTE ON CHRONIC PANCREATITIS (HCC): Status: ACTIVE | Noted: 2018-04-18

## 2018-09-11 PROBLEM — E87.1 HYPONATREMIA: Status: ACTIVE | Noted: 2018-09-11

## 2018-09-11 LAB
ALBUMIN SERPL BCP-MCNC: 3.1 G/DL (ref 3.5–5)
ALBUMIN SERPL BCP-MCNC: 3.5 G/DL (ref 3.5–5)
ALP SERPL-CCNC: 112 U/L (ref 46–116)
ALP SERPL-CCNC: 88 U/L (ref 46–116)
ALT SERPL W P-5'-P-CCNC: 44 U/L (ref 12–78)
ALT SERPL W P-5'-P-CCNC: 51 U/L (ref 12–78)
ANION GAP SERPL CALCULATED.3IONS-SCNC: 17 MMOL/L (ref 4–13)
ANION GAP SERPL CALCULATED.3IONS-SCNC: 21 MMOL/L (ref 4–13)
APTT PPP: 33 SECONDS (ref 24–36)
AST SERPL W P-5'-P-CCNC: 61 U/L (ref 5–45)
AST SERPL W P-5'-P-CCNC: 78 U/L (ref 5–45)
BACTERIA UR QL AUTO: ABNORMAL /HPF
BASOPHILS # BLD AUTO: 0.06 THOUSANDS/ΜL (ref 0–0.1)
BASOPHILS NFR BLD AUTO: 0 % (ref 0–1)
BILIRUB SERPL-MCNC: 0.6 MG/DL (ref 0.2–1)
BILIRUB SERPL-MCNC: 1 MG/DL (ref 0.2–1)
BILIRUB UR QL STRIP: NEGATIVE
BUN SERPL-MCNC: 13 MG/DL (ref 5–25)
BUN SERPL-MCNC: 17 MG/DL (ref 5–25)
CALCIUM SERPL-MCNC: 7.6 MG/DL (ref 8.3–10.1)
CALCIUM SERPL-MCNC: 8.6 MG/DL (ref 8.3–10.1)
CHLORIDE SERPL-SCNC: 100 MMOL/L (ref 100–108)
CHLORIDE SERPL-SCNC: 101 MMOL/L (ref 100–108)
CHOLEST SERPL-MCNC: 167 MG/DL (ref 50–200)
CLARITY UR: CLEAR
CO2 SERPL-SCNC: 11 MMOL/L (ref 21–32)
CO2 SERPL-SCNC: 15 MMOL/L (ref 21–32)
COLOR UR: ABNORMAL
CREAT SERPL-MCNC: 0.84 MG/DL (ref 0.6–1.3)
CREAT SERPL-MCNC: 0.93 MG/DL (ref 0.6–1.3)
EOSINOPHIL # BLD AUTO: 0.16 THOUSAND/ΜL (ref 0–0.61)
EOSINOPHIL NFR BLD AUTO: 1 % (ref 0–6)
ERYTHROCYTE [DISTWIDTH] IN BLOOD BY AUTOMATED COUNT: 14 % (ref 11.6–15.1)
ERYTHROCYTE [DISTWIDTH] IN BLOOD BY AUTOMATED COUNT: 14.2 % (ref 11.6–15.1)
FIBRINOGEN PPP-MCNC: 413 MG/DL (ref 227–495)
GFR SERPL CREATININE-BSD FRML MDRD: 106 ML/MIN/1.73SQ M
GFR SERPL CREATININE-BSD FRML MDRD: 99 ML/MIN/1.73SQ M
GLUCOSE SERPL-MCNC: 127 MG/DL (ref 65–140)
GLUCOSE SERPL-MCNC: 154 MG/DL (ref 65–140)
GLUCOSE UR STRIP-MCNC: NEGATIVE MG/DL
HCT VFR BLD AUTO: 44.1 % (ref 36.5–49.3)
HCT VFR BLD AUTO: 48 % (ref 36.5–49.3)
HDLC SERPL-MCNC: 14 MG/DL (ref 40–60)
HGB BLD-MCNC: 14.7 G/DL (ref 12–17)
HGB BLD-MCNC: 16.8 G/DL (ref 12–17)
HGB UR QL STRIP.AUTO: NEGATIVE
IMM GRANULOCYTES # BLD AUTO: 0.07 THOUSAND/UL (ref 0–0.2)
IMM GRANULOCYTES NFR BLD AUTO: 0 % (ref 0–2)
INR PPP: 1.02 (ref 0.86–1.17)
KETONES UR STRIP-MCNC: NEGATIVE MG/DL
LACTATE SERPL-SCNC: <0.3 MMOL/L (ref 0.5–2)
LEUKOCYTE ESTERASE UR QL STRIP: NEGATIVE
LIPASE SERPL-CCNC: 945 U/L (ref 73–393)
LYMPHOCYTES # BLD AUTO: 2.27 THOUSANDS/ΜL (ref 0.6–4.47)
LYMPHOCYTES NFR BLD AUTO: 14 % (ref 14–44)
MAGNESIUM SERPL-MCNC: 1.8 MG/DL (ref 1.6–2.6)
MCH RBC QN AUTO: 32.3 PG (ref 26.8–34.3)
MCH RBC QN AUTO: 33.7 PG (ref 26.8–34.3)
MCHC RBC AUTO-ENTMCNC: 33.3 G/DL (ref 31.4–37.4)
MCHC RBC AUTO-ENTMCNC: 35 G/DL (ref 31.4–37.4)
MCV RBC AUTO: 96 FL (ref 82–98)
MCV RBC AUTO: 97 FL (ref 82–98)
MONOCYTES # BLD AUTO: 0.89 THOUSAND/ΜL (ref 0.17–1.22)
MONOCYTES NFR BLD AUTO: 6 % (ref 4–12)
NEUTROPHILS # BLD AUTO: 12.47 THOUSANDS/ΜL (ref 1.85–7.62)
NEUTS SEG NFR BLD AUTO: 79 % (ref 43–75)
NITRITE UR QL STRIP: NEGATIVE
NON-SQ EPI CELLS URNS QL MICRO: ABNORMAL /HPF
NONHDLC SERPL-MCNC: 153 MG/DL
NRBC BLD AUTO-RTO: 0 /100 WBCS
PH UR STRIP.AUTO: 6.5 [PH] (ref 4.5–8)
PHOSPHATE SERPL-MCNC: 2.8 MG/DL (ref 2.7–4.5)
PLATELET # BLD AUTO: 158 THOUSANDS/UL (ref 149–390)
PLATELET # BLD AUTO: 207 THOUSANDS/UL (ref 149–390)
PMV BLD AUTO: 10.4 FL (ref 8.9–12.7)
PMV BLD AUTO: 11.1 FL (ref 8.9–12.7)
POTASSIUM SERPL-SCNC: 3.9 MMOL/L (ref 3.5–5.3)
POTASSIUM SERPL-SCNC: 5.1 MMOL/L (ref 3.5–5.3)
PROT SERPL-MCNC: 5.9 G/DL (ref 6.4–8.2)
PROT SERPL-MCNC: 7.1 G/DL (ref 6.4–8.2)
PROT UR STRIP-MCNC: ABNORMAL MG/DL
PROTHROMBIN TIME: 13.3 SECONDS (ref 11.8–14.2)
RBC # BLD AUTO: 4.55 MILLION/UL (ref 3.88–5.62)
RBC # BLD AUTO: 4.98 MILLION/UL (ref 3.88–5.62)
RBC #/AREA URNS AUTO: ABNORMAL /HPF
SODIUM SERPL-SCNC: 132 MMOL/L (ref 136–145)
SODIUM SERPL-SCNC: 133 MMOL/L (ref 136–145)
SP GR UR STRIP.AUTO: 1.02 (ref 1–1.03)
TRIGL SERPL-MCNC: 1655 MG/DL
UROBILINOGEN UR QL STRIP.AUTO: 0.2 E.U./DL
WBC # BLD AUTO: 12.02 THOUSAND/UL (ref 4.31–10.16)
WBC # BLD AUTO: 15.92 THOUSAND/UL (ref 4.31–10.16)
WBC #/AREA URNS AUTO: ABNORMAL /HPF

## 2018-09-11 PROCEDURE — 85384 FIBRINOGEN ACTIVITY: CPT | Performed by: NURSE PRACTITIONER

## 2018-09-11 PROCEDURE — 74177 CT ABD & PELVIS W/CONTRAST: CPT

## 2018-09-11 PROCEDURE — 80053 COMPREHEN METABOLIC PANEL: CPT | Performed by: PHYSICIAN ASSISTANT

## 2018-09-11 PROCEDURE — 80053 COMPREHEN METABOLIC PANEL: CPT | Performed by: EMERGENCY MEDICINE

## 2018-09-11 PROCEDURE — 99254 IP/OBS CNSLTJ NEW/EST MOD 60: CPT | Performed by: INTERNAL MEDICINE

## 2018-09-11 PROCEDURE — 99222 1ST HOSP IP/OBS MODERATE 55: CPT | Performed by: INTERNAL MEDICINE

## 2018-09-11 PROCEDURE — 02HV33Z INSERTION OF INFUSION DEVICE INTO SUPERIOR VENA CAVA, PERCUTANEOUS APPROACH: ICD-10-PCS | Performed by: INTERNAL MEDICINE

## 2018-09-11 PROCEDURE — 84100 ASSAY OF PHOSPHORUS: CPT | Performed by: PHYSICIAN ASSISTANT

## 2018-09-11 PROCEDURE — 85027 COMPLETE CBC AUTOMATED: CPT | Performed by: PHYSICIAN ASSISTANT

## 2018-09-11 PROCEDURE — 80061 LIPID PANEL: CPT | Performed by: PHYSICIAN ASSISTANT

## 2018-09-11 PROCEDURE — 71045 X-RAY EXAM CHEST 1 VIEW: CPT

## 2018-09-11 PROCEDURE — 96365 THER/PROPH/DIAG IV INF INIT: CPT

## 2018-09-11 PROCEDURE — 99285 EMERGENCY DEPT VISIT HI MDM: CPT

## 2018-09-11 PROCEDURE — 96374 THER/PROPH/DIAG INJ IV PUSH: CPT

## 2018-09-11 PROCEDURE — 83735 ASSAY OF MAGNESIUM: CPT | Performed by: PHYSICIAN ASSISTANT

## 2018-09-11 PROCEDURE — 96361 HYDRATE IV INFUSION ADD-ON: CPT

## 2018-09-11 PROCEDURE — 81001 URINALYSIS AUTO W/SCOPE: CPT | Performed by: EMERGENCY MEDICINE

## 2018-09-11 PROCEDURE — 6A550Z3 PHERESIS OF PLASMA, SINGLE: ICD-10-PCS | Performed by: INTERNAL MEDICINE

## 2018-09-11 PROCEDURE — 85730 THROMBOPLASTIN TIME PARTIAL: CPT | Performed by: PHYSICIAN ASSISTANT

## 2018-09-11 PROCEDURE — 83690 ASSAY OF LIPASE: CPT | Performed by: EMERGENCY MEDICINE

## 2018-09-11 PROCEDURE — 85025 COMPLETE CBC W/AUTO DIFF WBC: CPT | Performed by: EMERGENCY MEDICINE

## 2018-09-11 PROCEDURE — 96375 TX/PRO/DX INJ NEW DRUG ADDON: CPT

## 2018-09-11 PROCEDURE — 83605 ASSAY OF LACTIC ACID: CPT | Performed by: EMERGENCY MEDICINE

## 2018-09-11 PROCEDURE — 36415 COLL VENOUS BLD VENIPUNCTURE: CPT | Performed by: EMERGENCY MEDICINE

## 2018-09-11 PROCEDURE — 85610 PROTHROMBIN TIME: CPT | Performed by: PHYSICIAN ASSISTANT

## 2018-09-11 RX ORDER — TRAZODONE HYDROCHLORIDE 50 MG/1
50 TABLET ORAL
Status: DISCONTINUED | OUTPATIENT
Start: 2018-09-11 | End: 2018-09-17 | Stop reason: HOSPADM

## 2018-09-11 RX ORDER — SODIUM CHLORIDE 9 MG/ML
200 INJECTION, SOLUTION INTRAVENOUS CONTINUOUS
Status: DISCONTINUED | OUTPATIENT
Start: 2018-09-11 | End: 2018-09-14

## 2018-09-11 RX ORDER — DOCUSATE SODIUM 100 MG/1
100 CAPSULE, LIQUID FILLED ORAL 2 TIMES DAILY PRN
Status: DISCONTINUED | OUTPATIENT
Start: 2018-09-11 | End: 2018-09-17 | Stop reason: HOSPADM

## 2018-09-11 RX ORDER — SODIUM CHLORIDE, SODIUM GLUCONATE, SODIUM ACETATE, POTASSIUM CHLORIDE, MAGNESIUM CHLORIDE, SODIUM PHOSPHATE, DIBASIC, AND POTASSIUM PHOSPHATE .53; .5; .37; .037; .03; .012; .00082 G/100ML; G/100ML; G/100ML; G/100ML; G/100ML; G/100ML; G/100ML
200 INJECTION, SOLUTION INTRAVENOUS CONTINUOUS
Status: DISCONTINUED | OUTPATIENT
Start: 2018-09-11 | End: 2018-09-11

## 2018-09-11 RX ORDER — OXYCODONE HYDROCHLORIDE 5 MG/1
5 TABLET ORAL ONCE
Status: COMPLETED | OUTPATIENT
Start: 2018-09-11 | End: 2018-09-11

## 2018-09-11 RX ORDER — ALBUMIN, HUMAN INJ 5% 5 %
175 SOLUTION INTRAVENOUS ONCE
Status: COMPLETED | OUTPATIENT
Start: 2018-09-11 | End: 2018-09-12

## 2018-09-11 RX ORDER — NICOTINE 21 MG/24HR
1 PATCH, TRANSDERMAL 24 HOURS TRANSDERMAL DAILY
Status: DISCONTINUED | OUTPATIENT
Start: 2018-09-11 | End: 2018-09-17 | Stop reason: HOSPADM

## 2018-09-11 RX ORDER — ACETAMINOPHEN 325 MG/1
650 TABLET ORAL EVERY 6 HOURS PRN
Status: DISCONTINUED | OUTPATIENT
Start: 2018-09-11 | End: 2018-09-17 | Stop reason: HOSPADM

## 2018-09-11 RX ORDER — ONDANSETRON 2 MG/ML
4 INJECTION INTRAMUSCULAR; INTRAVENOUS EVERY 6 HOURS PRN
Status: DISCONTINUED | OUTPATIENT
Start: 2018-09-11 | End: 2018-09-17 | Stop reason: HOSPADM

## 2018-09-11 RX ORDER — ONDANSETRON 2 MG/ML
4 INJECTION INTRAMUSCULAR; INTRAVENOUS ONCE
Status: DISCONTINUED | OUTPATIENT
Start: 2018-09-11 | End: 2018-09-12

## 2018-09-11 RX ORDER — PREGABALIN 75 MG/1
75 CAPSULE ORAL 3 TIMES DAILY
Status: DISCONTINUED | OUTPATIENT
Start: 2018-09-11 | End: 2018-09-17 | Stop reason: HOSPADM

## 2018-09-11 RX ORDER — FENOFIBRATE 145 MG/1
145 TABLET, COATED ORAL DAILY
Status: DISCONTINUED | OUTPATIENT
Start: 2018-09-11 | End: 2018-09-17 | Stop reason: HOSPADM

## 2018-09-11 RX ORDER — PANTOPRAZOLE SODIUM 40 MG/1
40 TABLET, DELAYED RELEASE ORAL 2 TIMES DAILY
Status: DISCONTINUED | OUTPATIENT
Start: 2018-09-11 | End: 2018-09-17 | Stop reason: HOSPADM

## 2018-09-11 RX ORDER — NIACIN 500 MG/1
500 TABLET, EXTENDED RELEASE ORAL
Status: DISCONTINUED | OUTPATIENT
Start: 2018-09-11 | End: 2018-09-17 | Stop reason: HOSPADM

## 2018-09-11 RX ORDER — ONDANSETRON 4 MG/1
4 TABLET, ORALLY DISINTEGRATING ORAL ONCE
Status: COMPLETED | OUTPATIENT
Start: 2018-09-11 | End: 2018-09-11

## 2018-09-11 RX ORDER — SODIUM CHLORIDE, SODIUM GLUCONATE, SODIUM ACETATE, POTASSIUM CHLORIDE, MAGNESIUM CHLORIDE, SODIUM PHOSPHATE, DIBASIC, AND POTASSIUM PHOSPHATE .53; .5; .37; .037; .03; .012; .00082 G/100ML; G/100ML; G/100ML; G/100ML; G/100ML; G/100ML; G/100ML
1000 INJECTION, SOLUTION INTRAVENOUS ONCE
Status: COMPLETED | OUTPATIENT
Start: 2018-09-11 | End: 2018-09-11

## 2018-09-11 RX ORDER — ATORVASTATIN CALCIUM 40 MG/1
80 TABLET, FILM COATED ORAL
Status: DISCONTINUED | OUTPATIENT
Start: 2018-09-11 | End: 2018-09-17 | Stop reason: HOSPADM

## 2018-09-11 RX ADMIN — PREGABALIN 75 MG: 75 CAPSULE ORAL at 23:02

## 2018-09-11 RX ADMIN — HYDROMORPHONE HYDROCHLORIDE 1 MG: 1 INJECTION, SOLUTION INTRAMUSCULAR; INTRAVENOUS; SUBCUTANEOUS at 11:34

## 2018-09-11 RX ADMIN — HYDROMORPHONE HYDROCHLORIDE 0.5 MG: 1 INJECTION, SOLUTION INTRAMUSCULAR; INTRAVENOUS; SUBCUTANEOUS at 03:31

## 2018-09-11 RX ADMIN — ONDANSETRON 4 MG: 2 INJECTION INTRAMUSCULAR; INTRAVENOUS at 13:31

## 2018-09-11 RX ADMIN — NIACIN 500 MG: 500 TABLET, FILM COATED, EXTENDED RELEASE ORAL at 09:22

## 2018-09-11 RX ADMIN — ONDANSETRON 4 MG: 4 TABLET, ORALLY DISINTEGRATING ORAL at 03:06

## 2018-09-11 RX ADMIN — IOHEXOL 35 ML: 350 INJECTION, SOLUTION INTRAVENOUS at 02:14

## 2018-09-11 RX ADMIN — ACETAMINOPHEN 650 MG: 325 TABLET, FILM COATED ORAL at 20:45

## 2018-09-11 RX ADMIN — PANCRELIPASE 12000 UNITS: 30000; 6000; 19000 CAPSULE, DELAYED RELEASE PELLETS ORAL at 09:22

## 2018-09-11 RX ADMIN — HYDROMORPHONE HYDROCHLORIDE 1 MG: 1 INJECTION, SOLUTION INTRAMUSCULAR; INTRAVENOUS; SUBCUTANEOUS at 23:05

## 2018-09-11 RX ADMIN — SODIUM CHLORIDE, SODIUM GLUCONATE, SODIUM ACETATE, POTASSIUM CHLORIDE, MAGNESIUM CHLORIDE, SODIUM PHOSPHATE, DIBASIC, AND POTASSIUM PHOSPHATE 200 ML/HR: .53; .5; .37; .037; .03; .012; .00082 INJECTION, SOLUTION INTRAVENOUS at 04:27

## 2018-09-11 RX ADMIN — TRAZODONE HYDROCHLORIDE 50 MG: 50 TABLET, FILM COATED ORAL at 23:02

## 2018-09-11 RX ADMIN — PREGABALIN 75 MG: 75 CAPSULE ORAL at 09:20

## 2018-09-11 RX ADMIN — SODIUM CHLORIDE 125 ML/HR: 0.9 INJECTION, SOLUTION INTRAVENOUS at 09:29

## 2018-09-11 RX ADMIN — OXYCODONE HYDROCHLORIDE 5 MG: 5 TABLET ORAL at 02:55

## 2018-09-11 RX ADMIN — NICOTINE 1 PATCH: 21 PATCH TRANSDERMAL at 09:23

## 2018-09-11 RX ADMIN — SODIUM CHLORIDE 1000 ML: 0.9 INJECTION, SOLUTION INTRAVENOUS at 01:10

## 2018-09-11 RX ADMIN — HYDROMORPHONE HYDROCHLORIDE 1 MG: 1 INJECTION, SOLUTION INTRAMUSCULAR; INTRAVENOUS; SUBCUTANEOUS at 14:42

## 2018-09-11 RX ADMIN — ONDANSETRON 4 MG: 2 INJECTION INTRAMUSCULAR; INTRAVENOUS at 01:10

## 2018-09-11 RX ADMIN — SODIUM CHLORIDE 200 ML/HR: 0.9 INJECTION, SOLUTION INTRAVENOUS at 14:43

## 2018-09-11 RX ADMIN — HYDROMORPHONE HYDROCHLORIDE 1 MG: 1 INJECTION, SOLUTION INTRAMUSCULAR; INTRAVENOUS; SUBCUTANEOUS at 01:10

## 2018-09-11 RX ADMIN — ATORVASTATIN CALCIUM 80 MG: 40 TABLET, FILM COATED ORAL at 16:59

## 2018-09-11 RX ADMIN — PANTOPRAZOLE SODIUM 40 MG: 40 TABLET, DELAYED RELEASE ORAL at 16:59

## 2018-09-11 RX ADMIN — PANCRELIPASE 12000 UNITS: 30000; 6000; 19000 CAPSULE, DELAYED RELEASE PELLETS ORAL at 16:59

## 2018-09-11 RX ADMIN — ALBUMIN HUMAN 175 G: 0.05 INJECTION, SOLUTION INTRAVENOUS at 20:57

## 2018-09-11 RX ADMIN — Medication 1 G: at 20:31

## 2018-09-11 RX ADMIN — PREGABALIN 75 MG: 75 CAPSULE ORAL at 16:59

## 2018-09-11 RX ADMIN — HYDROMORPHONE HYDROCHLORIDE 1 MG: 1 INJECTION, SOLUTION INTRAMUSCULAR; INTRAVENOUS; SUBCUTANEOUS at 16:01

## 2018-09-11 RX ADMIN — SODIUM CHLORIDE 200 ML/HR: 0.9 INJECTION, SOLUTION INTRAVENOUS at 20:10

## 2018-09-11 RX ADMIN — FENOFIBRATE 145 MG: 145 TABLET, FILM COATED ORAL at 09:20

## 2018-09-11 RX ADMIN — HYDROMORPHONE HYDROCHLORIDE 1 MG: 1 INJECTION, SOLUTION INTRAMUSCULAR; INTRAVENOUS; SUBCUTANEOUS at 19:23

## 2018-09-11 RX ADMIN — ENOXAPARIN SODIUM 40 MG: 40 INJECTION SUBCUTANEOUS at 09:20

## 2018-09-11 RX ADMIN — PANTOPRAZOLE SODIUM 40 MG: 40 TABLET, DELAYED RELEASE ORAL at 05:10

## 2018-09-11 RX ADMIN — HYDROMORPHONE HYDROCHLORIDE 1 MG: 1 INJECTION, SOLUTION INTRAMUSCULAR; INTRAVENOUS; SUBCUTANEOUS at 08:22

## 2018-09-11 RX ADMIN — SODIUM CHLORIDE, SODIUM GLUCONATE, SODIUM ACETATE, POTASSIUM CHLORIDE, MAGNESIUM CHLORIDE, SODIUM PHOSPHATE, DIBASIC, AND POTASSIUM PHOSPHATE 1000 ML: .53; .5; .37; .037; .03; .012; .00082 INJECTION, SOLUTION INTRAVENOUS at 03:01

## 2018-09-11 RX ADMIN — HYDROMORPHONE HYDROCHLORIDE 1 MG: 1 INJECTION, SOLUTION INTRAMUSCULAR; INTRAVENOUS; SUBCUTANEOUS at 05:10

## 2018-09-11 NOTE — ASSESSMENT & PLAN NOTE
-Present with epigastric pain nausea and vomiting X 1 day  -Reports that he is currently being followed by GI last visit was about 3 months ago  -No fever, chills  -Lipase level 945  -Admit to med/surg  -NPO for now, will advance as tolerated  -continue home medications  -IV normal saline  -pain management PRN  -GI consult

## 2018-09-11 NOTE — CONSULTS
Consultation - [facility]  Jose Lovell 37 y o  male MRN: [id number]  Unit/Bed#: -01 Encounter: [id number]         Reason for Consult / Principal Problem:  Abdominal pain, history of chronic pancreatitis    HPI:  Sathish Cochran is a 75-year-old male with history of chronic pancreatitis complicated by pseudocyst in the past, status post celiac plexus block, status post cholecystectomy, and tobacco abuse  Patient presented to the emergency room yesterday for worsening abdominal pain associated with nausea and vomiting since Sunday  He was due to follow-up with pain management on 9/17  In the meantime, he was advised to increase Lyrica  His last bowel movement was on Sunday  Patient denies recent alcohol binge  He will drink about 3 beers in a week  He has a half a pack a day smoker  No new medications  He is compliant with his Creon  His pancreatitis was thought to be secondary to hypertriglyceridemia in the past   Patient's triglycerides spiked in June during his last bout of pancreatitis to 898  This was treated, and down trended to 250 with treatment  Latest triglyceride level on 08/14 was 746  On admission, CT of the abdomen revealed fat stranding in the upper abdomen located near the pancreas and gastric antrum  No obvious pseudocyst or abscess seen  Lipase 945 on admission  White blood cell count elevated at 15k  Patient lying in bed comfortably this morning  He reports that his abdominal pain is slightly improved since admission  He has not vomited  His vitals are stable  Patient's last EGD was in May  This revealed mild reflux esophagitis  Otherwise, exam was normal  Patient due to have outpatient EUS for gastric thickening on numerous CTs  He denies current pyrosis or regurgitation  Review of Systems:    CONSTITUTIONAL: Denies any fever, chills, or rigors  Good appetite, and no recent weight loss  HEENT: No earache or tinnitus   Denies hearing loss or visual disturbances  CARDIOVASCULAR: No chest pain or palpitations  RESPIRATORY: Denies any cough, hemoptysis, shortness of breath or dyspnea on exertion  GASTROINTESTINAL: As noted in the History of Present Illness  GENITOURINARY: No problems with urination  Denies any hematuria or dysuria  NEUROLOGIC: No dizziness or vertigo, denies headaches  MUSCULOSKELETAL: Denies any muscle or joint pain  SKIN: Denies skin rashes or itching  ENDOCRINE: Denies excessive thirst  Denies intolerance to heat or cold  PSYCHOSOCIAL: Denies depression or anxiety  Denies any recent memory loss  Historical Information   Past Medical History:   Diagnosis Date    Asthma     Chronic pain disorder     GERD (gastroesophageal reflux disease)     Hyperlipidemia     Liver abscess     Meniscus tear     left knee work injury last assessed 08/24/2016    Pancreatitis     Pneumonia      Past Surgical History:   Procedure Laterality Date    CHOLECYSTECTOMY      ESOPHAGOGASTRODUODENOSCOPY N/A 11/16/2017    Procedure: ESOPHAGOGASTRODUODENOSCOPY (EGD); Surgeon: Tere Brenner MD;  Location: [facility]; Service: Gastroenterology    ESOPHAGOGASTRODUODENOSCOPY N/A 4/19/2018    Procedure: ESOPHAGOGASTRODUODENOSCOPY (EGD); Surgeon: Julian Ge MD;  [facility]; Service: Gastroenterology    ESOPHAGOGASTRODUODENOSCOPY N/A 5/10/2018    Procedure: ESOPHAGOGASTRODUODENOSCOPY (EGD); Surgeon: Ileana Owens MD;  [facility]: [facility];   Service: Gastroenterology    KNEE ARTHROSCOPY Bilateral     KNEE ARTHROSCOPY Right 2009    cibishino last assessed 08/24/2016   [facility] LIVER SURGERY      NERVE BLOCK Bilateral 5/29/2018    Procedure: SPLANCHNIC NERVE BLOCK;  Surgeon: Armando Bee MD;  [facility];  Service: Pain Management     PANCREAS SURGERY      stents    IL INJECT NERV KAICK,CELIAC PLEXUS Bilateral 5/9/2017    Procedure: CELIAC PLEXUS BLOCK ;  Surgeon: Armando Bee MD;  [facility];  Service: Pain Management     NM INJECT NERV BLCK,CELIAC PLEXUS Bilateral 6/1/2017    Procedure: SPLANCHNIC NERVE BLOCK at T12;  Surgeon: Arsh Roman MD;  [facility];  Service: Pain Management     NM INJECT NERV BLCK,CELIAC PLEXUS Bilateral 8/8/2017    Procedure: BILATERAL SPLANCHNIC NERVE BLOCK T12;  Surgeon: Arsh Roman MD;  [facility];  Service: Pain Management     NM INJECT NERV [place] Bilateral 12/28/2017    Procedure: SPLANCHNIC NERVE BLOCK;  Surgeon: Arsh Roman MD;  [facility];  Service: Pain Management     NM LAP,CHOLECYSTECTOMY N/A 2/14/2017    Procedure: LAPAROSCOPIC CHOLECYSTECTOMY, IOC, POSSIBLE OPEN ;  Surgeon: Bishop Jay Jay MD;  [facility];  Service: General    ROTATOR CUFF REPAIR Right     SHOULDER ARTHROSCOPY      SHOULDER ARTHROSCOPY Right      Social History   History   Alcohol Use    0 6 oz/week    1 Cans of beer per week     Comment: rarely, occasionally, history of no alcohol use per allscripts     History   Drug Use No     History   Smoking Status    Current Every Day Smoker    Packs/day: 0 50    Years: 20 00   Smokeless Tobacco    Never Used     Family History   Problem Relation Age of Onset    Cirrhosis Mother     Heart disease Other         cardiac disorder    Cancer Other         Meds/Allergies     Current Facility-Administered Medications   Medication Dose Route Frequency    acetaminophen (TYLENOL) tablet 650 mg  650 mg Oral Q6H PRN    atorvastatin (LIPITOR) tablet 80 mg  80 mg Oral Daily With Dinner    docusate sodium (COLACE) capsule 100 mg  100 mg Oral BID PRN    enoxaparin (LOVENOX) subcutaneous injection 40 mg  40 mg Subcutaneous Daily    fenofibrate (TRICOR) tablet 145 mg  145 mg Oral Daily    HYDROmorphone (DILAUDID) injection 1 mg  1 mg Intravenous Q4H PRN    multi-electrolyte (ISOLYTE-S PH 7 4 equivalent) IV solution  200 mL/hr Intravenous Continuous    niacin (NIASPAN) CR tablet 500 mg  500 mg Oral Daily With Breakfast    nicotine (NICODERM CQ) 21 mg/24 hr TD 24 hr patch 1 patch  1 patch Transdermal Daily    ondansetron (ZOFRAN) injection 4 mg  4 mg Intravenous Once    ondansetron (ZOFRAN) injection 4 mg  4 mg Intravenous Q6H PRN    pancrelipase (Lip-Prot-Amyl) (CREON) delayed release capsule 12,000 Units  12,000 Units Oral TID With Meals    pantoprazole (PROTONIX) EC tablet 40 mg  40 mg Oral BID    pregabalin (LYRICA) capsule 75 mg  75 mg Oral TID    sodium chloride 0 9 % infusion  200 mL/hr Intravenous Continuous    traZODone (DESYREL) tablet 50 mg  50 mg Oral HS     Facility-Administered Medications Ordered in Other Encounters   Medication Dose Route Frequency    oxyCODONE-acetaminophen (PERCOCET) 5-325 mg per tablet 2 tablet  2 tablet Oral Once       Allergies   Allergen Reactions    Bee Venom Swelling         Objective     Blood pressure 143/67, pulse 96, temperature 98 5 °F (36 9 °C), temperature source Oral, resp  rate 18, height 5' 11" (1 803 m), weight 94 8 kg (208 lb 15 9 oz), SpO2 95 %  Intake/Output Summary (Last 24 hours) at 09/11/18 1210  Last data filed at 09/11/18 0110   Gross per 24 hour   Intake             1000 ml   Output                0 ml   Net             1000 ml         PHYSICAL EXAM:      General Appearance:   Alert and oriented x 3   Cooperative, and in no respiratory distress   HEENT:   Normocephalic, atraumatic, anicteric      Neck:  Supple, symmetrical, trachea midline   Lungs:   Clear to auscultation bilaterally   Heart[de-identified]   Regular rate and rhythm   Abdomen:   Soft, epigastric to mid abdominal tenderness without guarding, non-distended; normal bowel sounds; no masses, no organomegaly    Genitalia:   Deferred    Rectal:   Deferred    Extremities:  No cyanosis, clubbing or edema    Pulses:  2+ and symmetric all extremities    Skin:  Skin color, texture, turgor normal, no rashes or lesions    Lymph nodes:  No palpable cervical or supraclavicular lymphadenopathy        Lab Results:     Results from last 7 days  Lab Units 09/11/18  0608 09/11/18  0059   WBC Thousand/uL 12 02* 15 92*   HEMOGLOBIN g/dL 14 7 16 8   HEMATOCRIT % 44 1 48 0   PLATELETS Thousands/uL 158 207   NEUTROS PCT %  --  79*   LYMPHS PCT %  --  14   MONOS PCT %  --  6   EOS PCT %  --  1       Results from last 7 days  Lab Units 09/11/18  0608   SODIUM mmol/L 133*   POTASSIUM mmol/L 3 9   CHLORIDE mmol/L 101   CO2 mmol/L 11*   BUN mg/dL 13   CREATININE mg/dL 0 93   CALCIUM mg/dL 7 6*   ALK PHOS U/L 88   ALT U/L 44   AST U/L 61*       Results from last 7 days  Lab Units 09/11/18  0608   INR  1 02       Results from last 7 days  Lab Units 09/11/18  0059   LIPASE u/L 945*       Imaging Studies: I have personally reviewed pertinent imaging studies  Ct Abdomen Pelvis With Contrast    Result Date: 9/11/2018  Impression: Large amount of fat stranding is seen in the upper abdomen centered upon the pancreas and the gastric antrum  Statistically, this is most likely secondary to pancreatitis however the degree of fat stranding extends anteriorly more than is normally seen for pancreatitis suggesting this may have a gastric or duodenal component; upon review of the patient's prior exams, it is noted patient has had severe gastritis in the past   Recommend correlation with pancreas enzymes  Consider contrast examination in the appropriate clinical setting  No emphysematous gastritis  No obvious peritoneal collections (pseudocyst or abscess) on this noncontrast examination  No renal stones  No small bowel obstruction  Normal appendix  Workstation performed: [id number]       ASSESSMENT and PLAN:      1) Acute on chronic pancreatitis - Patient with evidence of fat stranding on CT, as well as abdominal pain  Patient's lipase mildly elevated  In the past, his pancreatitis was secondary to hypertriglyceridemia  He is on Crestor as an outpatient  He is also an everyday smoker  He has occasional alcohol use     - Check triglycerides, were elevated in mid August  We will likely need input from endocrinology regarding this  - Continue IV hydration 200 ml/hr   - NPO for now   - Creon with meals once patient is stable to advance diet  - Smoking cessation, low fat diet     2) History of gastric thickening - As noted on previous CTs  He has fat stranding during this admission  He had an EGD this year that did not show gastritis  He has no GERD symptoms currently  - Continue PPI BID  - Outpatient EUS as discussed previously    The patient was seen and examined by Dr Celso Darby, all rod medical decisions were made with Dr Celso Darby  Thank you for allowing us to participate in the care of this pleasant patient  We will follow up with you closely

## 2018-09-11 NOTE — ED PROVIDER NOTES
History  Chief Complaint   Patient presents with    Abdominal Pain     pt with chronic abdominal problem, started with upper abdomen pain, N/V/D since yesterday     37year-old male presents with 1 day of bilateral upper abdominal pain  Patient describes severe squeezing pain that came on gradually, worsening and accompanied by more severe shooting pain and continues in the ER  Patient states nothing aggravates the pain and nothing alleviates it  ROS: Patient associates nausea with multiple episodes of nonbloody, nonbilious emesis and worsening diarrhea; denies fever/chills, dyspnea, anorexia, constipation, diaphoresis, chest pain, groin pain, dysuria, hematuria, melena, or back/neck pain  All other systems reviewed and negative  Patient   denies any recent illnesses  Patient denies any recent use of antibiotics, international travel, or trauma  Objective:   Vital signs reviewed  Constitutional: moderate acute distress  Eyes: No scleral icterus  HENT: Head normocephalic  Pharynx moist    CV: Tachycardic rate and regular rhythm  Respiratory: Lungs clear to auscultation bilaterally without adventitious sounds  Abdomen: Inspection of an abdomen with previous abdominal surgical incisions noted without erythema, rashes or ecchymosis noted  No abdominal pulsations noted  Normal bowel sounds with no bruit auscultated  Soft abdomen  Palpitation noted tenderness mainly upper fields but lesser in the lower fields  No masses or pulsatile aorta noted on examination  No rebound or guarding noted on examination, non-peritoneal exam    Back: Normal inspection with no rash or signs of herpes zoster  Costovertebral tenderness not present  Skin: No ecchymosis of the umbilicus (negative Newton's sign) or flank (negative Grey Flaherty's sign)  Warm and dry  Extremities: Non-tender lower extremities without asymmetry  Neuro: Alert  Answers questions appropriately  Psych: Normal mood and affect       Medical Decision Making   Abdominal pain with a broad differential  Will establish IV access and make patient NPO considering possibility of surgical intervention required  Will administer hydromorphone for pain control  Will initaite fluids at this point in the assessment with concerns for pancreatitis  Differential includes significant likelihood of intra-abdominal pathology and based on patient's exam findings and history  Will obtain CBC to evaluate for anemia and leukocytosis  Will obtain CMP to evaluate electrolytes, renal and hepatic function  Will obtain lipase to evaluate for potential pancreatitis  Will obtain lactate to evaluate for mesenteric ischemia and sepsis  Will obtain urinalysis to evaluate for possible urinary infectious sources and ketones to evaluate potential hydration state  Based on patient's history, physical exam and presenting complaint, will obtain contrast enhanced CT imaging of patient's abdomen and pelvis to further evaluate for possible intraabdominal pathology including pancreatic complications including necrotizing pancreatitis  Prior to Admission Medications   Prescriptions Last Dose Informant Patient Reported? Taking? Choline Fenofibrate (FENOFIBRIC ACID) 135 MG CPDR  Self Yes No   Sig: Take 1 capsule by mouth daily   EPINEPHrine (EPIPEN 2-ARIEL) 0 3 mg/0 3 mL SOAJ  Self Yes No   Sig: EpiPen 2-Ariel 0 3 MG/0 3ML Injection Solution Auto-injector  INJECT 0 3ML INTRAMUSCULARLY AS DIRECTED     Quantity: 1;  Refills: 1      Toro Briceño ; Active  2 Solution Auto-injector Pen   FLAXSEED, LINSEED, PO  Self Yes No   Sig: Take 1,000 mg by mouth 3 (three) times a day   Pancrelipase, Lip-Prot-Amyl, (CREON) 98594 units CPEP  Self No No   Sig: Take 1 capsule (36,000 Units total) by mouth 3 (three) times a day before meals   VENTOLIN  (90 Base) MCG/ACT inhaler   No No   Sig: INHALE 1 TO 2 PUFFS BY MOUTH EVERY 4 TO 6 HOURS as needed    acetaminophen (TYLENOL) 325 mg tablet No No   Sig: Take 2 tablets (650 mg total) by mouth every 6 (six) hours as needed for fever   choline fenofibrate (TRILIPIX) 135 MG capsule   No No   Sig: TAKE ONE CAPSULE BY MOUTH ONCE A DAY    cyclobenzaprine (FLEXERIL) 10 mg tablet   No No   Sig: Take 1 tablet (10 mg total) by mouth 3 (three) times a day as needed for muscle spasms for up to 30 days   docusate sodium (COLACE) 100 mg capsule   No No   Sig: Take 1 capsule (100 mg total) by mouth 2 (two) times a day as needed for constipation   niacin (NIASPAN) 500 mg CR tablet   No No   Sig: Take 1 tablet (500 mg total) by mouth daily with breakfast for 90 days   niacin (NIASPAN) 500 mg CR tablet   No No   Sig: TAKE ONE TABLET BY MOUTH EVERY DAY WITH BREAKFAST    nicotine (NICODERM CQ) 21 mg/24 hr TD 24 hr patch   No No   Sig: Place 1 patch on the skin daily   omega-3-acid ethyl esters (LOVAZA) 1 g capsule  Self No No   Sig: Take 2 capsules (2 g total) by mouth 2 (two) times a day for 90 days   ondansetron (ZOFRAN-ODT) 4 mg disintegrating tablet   No No   Sig: DISSOLVE ONE TABLET IN MOUTH EVERY 8 HOURS AS NEEDED FOR NAUSEA AND VOMITING    oxyCODONE (ROXICODONE) 5 mg immediate release tablet   No No   Sig: Take 1 tablet (5 mg total) by mouth 3 (three) times a day as needed for moderate pain for up to 30 days Max Daily Amount: 15 mg   pancrelipase, Lip-Prot-Amyl, (CREON) 12,000 units capsule  Self Yes No   Sig: Take 12,000 units of lipase by mouth 3 (three) times a day with meals     pantoprazole (PROTONIX) 40 mg tablet   No No   Sig: Take 1 tablet (40 mg total) by mouth 2 (two) times a day for 90 days   pregabalin (LYRICA) 75 mg capsule   No No   Sig: Take 1 capsule (75 mg total) by mouth 3 (three) times a day for 30 days   ranitidine (ZANTAC) 150 MG capsule   Yes No   Sig: Take 150 mg by mouth 2 (two) times a day   rosuvastatin (CRESTOR) 20 MG tablet   No No   Sig: TAKE ONE TABLET BY MOUTH EVERY DAY    rosuvastatin (CRESTOR) 40 MG tablet   No No   Sig: Take 1 tablet (40 mg total) by mouth daily   traZODone (DESYREL) 50 mg tablet   No No   Sig: Take 1 tablet (50 mg total) by mouth daily at bedtime      Facility-Administered Medications: None       Past Medical History:   Diagnosis Date    Asthma     Chronic pain disorder     GERD (gastroesophageal reflux disease)     Hyperlipidemia     Liver abscess     Meniscus tear     left knee work injury last assessed 08/24/2016    Pancreatitis     Pneumonia        Past Surgical History:   Procedure Laterality Date    CHOLECYSTECTOMY      ESOPHAGOGASTRODUODENOSCOPY N/A 11/16/2017    Procedure: ESOPHAGOGASTRODUODENOSCOPY (EGD); Surgeon: Kaitlynn Gould MD;  Location: [facility]; Service: Gastroenterology    ESOPHAGOGASTRODUODENOSCOPY N/A 4/19/2018    Procedure: ESOPHAGOGASTRODUODENOSCOPY (EGD); Surgeon: Blake Hermosillo MD;  [facility]; Service: Gastroenterology    ESOPHAGOGASTRODUODENOSCOPY N/A 5/10/2018    Procedure: ESOPHAGOGASTRODUODENOSCOPY (EGD); Surgeon: Al Tapia MD;  [facility];   Service: Gastroenterology    KNEE ARTHROSCOPY Bilateral     KNEE ARTHROSCOPY Right 2009    cibishino last assessed 08/24/2016    LIVER SURGERY      NERVE BLOCK Bilateral 5/29/2018    Procedure: SPLANCHNIC NERVE BLOCK;  Surgeon: Katia Wilson MD;  [facility];  Service: Pain Management     PANCREAS SURGERY      stents    DC INJECT NERV BLCK,CELIAC PLEXUS Bilateral 5/9/2017    Procedure: CELIAC PLEXUS BLOCK ;  Surgeon: Katia Wilson MD;  [facility];  Service: Pain Management     DC INJECT NERV BLCK,CELIAC PLEXUS Bilateral 6/1/2017    Procedure: SPLANCHNIC NERVE BLOCK at T12;  Surgeon: Katia Wilson MD;  [facility];  Service: Pain Management     DC INJECT NERV BLCK,CELIAC PLEXUS Bilateral 8/8/2017    Procedure: BILATERAL SPLANCHNIC NERVE BLOCK T12;  Surgeon: Katia Wilson MD;  [facility];  Service: Pain Management     DC INJECT NERV BLCK,PARAVERT SYMPATH Bilateral 12/28/2017    Procedure: SPLANCHNIC NERVE BLOCK;  Surgeon: Sahnta Lopez MD;  [facility];  Service: Pain Management     DC LAP,CHOLECYSTECTOMY N/A 2/14/2017    Procedure: LAPAROSCOPIC CHOLECYSTECTOMY, IOC, POSSIBLE OPEN ;  Surgeon: Carroll Cockayne, MD;  [facility];  Service: General    ROTATOR CUFF REPAIR Right     SHOULDER ARTHROSCOPY      SHOULDER ARTHROSCOPY Right        Family History   Problem Relation Age of Onset    Cirrhosis Mother     Heart disease Other         cardiac disorder    Cancer Other      I have reviewed and agree with the history as documented  Social History   Substance Use Topics    Smoking status: Current Every Day Smoker     Packs/day: 0 50     Years: 20 00    Smokeless tobacco: Never Used    Alcohol use Yes      Comment: rarely, occasionally, history of no alcohol use per allscripts        Review of Systems   All other systems reviewed and are negative        Physical Exam  Physical Exam    Vital Signs  ED Triage Vitals [09/10/18 2240]   Temperature Pulse Respirations Blood Pressure SpO2   98 2 °F (36 8 °C) 102 20 131/80 100 %      Temp Source Heart Rate Source Patient Position - Orthostatic VS BP Location FiO2 (%)   Oral Monitor Sitting Right arm --      Pain Score       7           Vitals:    09/10/18 2240 09/11/18 0118 09/11/18 0402   BP: 131/80 (!) 166/106 165/92   Pulse: 102 92 99   Patient Position - Orthostatic VS: Sitting Sitting Sitting       Visual Acuity      ED Medications  Medications   multi-electrolyte (ISOLYTE-S PH 7 4 equivalent) IV solution (not administered)   HYDROmorphone (DILAUDID) injection 1 mg (not administered)   ondansetron (ZOFRAN) injection 4 mg (not administered)   HYDROmorphone (DILAUDID) injection 1 mg (1 mg Intravenous Given 9/11/18 0110)   sodium chloride 0 9 % bolus 1,000 mL (0 mL Intravenous Stopped 9/11/18 0326)   ondansetron (ZOFRAN) injection 4 mg (4 mg Intravenous Given 9/11/18 0110)   multi-electrolyte (ISOLYTE-S PH 7 4) bolus 1,000 mL (0 mL Intravenous Stopped 9/11/18 0402)   iohexol (OMNIPAQUE) 350 MG/ML injection (MULTI-DOSE) 100 mL (35 mL Intravenous Given 9/11/18 0214)   oxyCODONE (ROXICODONE) IR tablet 5 mg (5 mg Oral Given 9/11/18 0255)   ondansetron (ZOFRAN-ODT) dispersible tablet 4 mg (4 mg Oral Given 9/11/18 0306)   HYDROmorphone (DILAUDID) injection 0 5 mg (0 5 mg Intravenous Given 9/11/18 0331)       Diagnostic Studies  Results Reviewed     Procedure Component Value Units Date/Time    Lactic acid, plasma [[id number]] Collected:  09/11/18 0308    Lab Status: In process Specimen:  Blood from Hand, Left Updated:  09/11/18 0311    CMP [[id number]]  (Abnormal) Collected:  09/11/18 0059    Lab Status:  Final result Specimen:  Blood from Arm, Left Updated:  09/11/18 0158     Sodium 132 (L) mmol/L      Potassium 5 1 mmol/L      Chloride 100 mmol/L      CO2 15 (L) mmol/L      ANION GAP 17 (H) mmol/L      BUN 17 mg/dL      Creatinine 0 84 mg/dL      Glucose 127 mg/dL      Calcium 8 6 mg/dL      AST 78 (H) U/L      ALT 51 U/L      Alkaline Phosphatase 112 U/L      Total Protein 7 1 g/dL      Albumin 3 5 g/dL      Total Bilirubin 0 60 mg/dL      eGFR 106 ml/min/1 73sq m     Narrative:         National Kidney Disease Education Program recommendations are as follows:  GFR calculation is accurate only with a steady state creatinine  Chronic Kidney disease less than 60 ml/min/1 73 sq  meters  Kidney failure less than 15 ml/min/1 73 sq  meters      Lipase [[id number]]  (Abnormal) Collected:  09/11/18 0059    Lab Status:  Final result Specimen:  Blood from Arm, Left Updated:  09/11/18 0157     Lipase 945 (H) u/L     CBC and differential [[id number]]  (Abnormal) Collected:  09/11/18 0059    Lab Status:  Final result Specimen:  Blood from Arm, Left Updated:  09/11/18 0105     WBC 15 92 (H) Thousand/uL      RBC 4 98 Million/uL      Hemoglobin 16 8 g/dL      Hematocrit 48 0 %      MCV 96 fL      MCH 33 7 pg      MCHC 35 0 g/dL      RDW 14 2 % MPV 11 1 fL      Platelets 324 Thousands/uL      nRBC 0 /100 WBCs      Neutrophils Relative 79 (H) %      Immat GRANS % 0 %      Lymphocytes Relative 14 %      Monocytes Relative 6 %      Eosinophils Relative 1 %      Basophils Relative 0 %      Neutrophils Absolute 12 47 (H) Thousands/µL      Immature Grans Absolute 0 07 Thousand/uL      Lymphocytes Absolute 2 27 Thousands/µL      Monocytes Absolute 0 89 Thousand/µL      Eosinophils Absolute 0 16 Thousand/µL      Basophils Absolute 0 06 Thousands/µL     UA w Reflex to Microscopic [[id number]]     Lab Status:  No result Specimen:  Urine                  CT abdomen pelvis with contrast   Final Result by Judy Montes MD (09/11 0230)      Large amount of fat stranding is seen in the upper abdomen centered upon the pancreas and the gastric antrum  Statistically, this is most likely secondary to pancreatitis however the degree of fat stranding extends anteriorly more than is normally seen    for pancreatitis suggesting this may have a gastric or duodenal component; upon review of the patient's prior exams, it is noted patient has had severe gastritis in the past   Recommend correlation with pancreas enzymes  Consider contrast examination    in the appropriate clinical setting  No emphysematous gastritis  No obvious peritoneal collections (pseudocyst or abscess) on this noncontrast examination  No renal stones  No small bowel obstruction  Normal appendix          Workstation performed: [id number]                    Procedures  Procedures       Phone Contacts  ED Phone Contact    ED Course                               MDM  CritCare Time    Disposition  Final diagnoses:   Acute on chronic pancreatitis [facility])     Time reflects when diagnosis was documented in both MDM as applicable and the Disposition within this note     Time User Action Codes Description Comment    9/11/2018  3:44 AM Sadaf Bryan Add [K85 90] Acute pancreatitis     9/11/2018  3:44 AM Wild Espino Add [D15 95,  K86 1] Acute on chronic pancreatitis ([facility] 75 )     9/11/2018  3:44 AM Wild Espino Modify [K85 90,  K86 1] Acute on chronic pancreatitis ([facility] 75 )     9/11/2018  3:44 AM Wild Espino Remove [K85 90] Acute pancreatitis       ED Disposition     ED Disposition Condition Comment    Admit  Case was discussed with CAREY and the patient's admission status was agreed to be Admission Status: inpatient status to the service of Dr Mikhail Arias    None         Patient's Medications   Discharge Prescriptions    No medications on file     No discharge procedures on file      ED Provider  Electronically Signed by           Dianne Alatorre MD  09/11/18 7657

## 2018-09-11 NOTE — CASE MANAGEMENT
Initial Clinical Review    Admission: Date/Time/Statement: 9/11/18 @ 0345     Orders Placed This Encounter   Procedures    Inpatient Admission (expected length of stay for this patient is greater than two midnights)     Standing Status:   Standing     Number of Occurrences:   1     Order Specific Question:   Admitting Physician     Answer:   Francisco Orellana [[id number]]     Order Specific Question:   Level of Care     Answer:   Med Surg [16]     Order Specific Question:   Estimated length of stay     Answer:   More than 2 Midnights     Order Specific Question:   Certification     Answer:   I certify that inpatient services are medically necessary for this patient for a duration of greater than two midnights  See H&P and MD Progress Notes for additional information about the patient's course of treatment  ED: Date/Time/Mode of Arrival:   ED Arrival Information     Expected Arrival Acuity Means of Arrival Escorted By Service Admission Type    - 9/10/2018 22:30 Urgent Walk-In Self General Medicine Urgent    Arrival Complaint    ABD PAIN          Chief Complaint:   Chief Complaint   Patient presents with    Abdominal Pain     pt with chronic abdominal problem, started with upper abdomen pain, N/V/D since yesterday       History of Illness: 36 yo male from home to ED w/ c/o epigastric pain , N/V x1 day    Hx pancreatitis, last episode 3 months ago      ED Vital Signs:   ED Triage Vitals [09/10/18 2240]   Temperature Pulse Respirations Blood Pressure SpO2   98 2 °F (36 8 °C) 102 20 131/80 100 %      Temp Source Heart Rate Source Patient Position - Orthostatic VS BP Location FiO2 (%)   Oral Monitor Sitting Right arm --      Pain Score       7        Wt Readings from Last 1 Encounters:   09/11/18 94 8 kg (208 lb 15 9 oz)       Vital Signs (abnormal): wnl     Abnormal Labs/Diagnostic Test Results: Na  132, Co2  15, an gap 17, ast 78  Lipase  945, wbc  15 92  CT abd- Large amount of fat stranding is seen in the upper abdomen centered upon the pancreas and the gastric antrum   Statistically, this is most likely secondary to pancreatitis however the degree of fat stranding extends anteriorly more than is normally seen   for pancreatitis suggesting this may have a gastric or duodenal component; upon review of the patient's prior exams, it is noted patient has had severe gastritis in the past   Recommend correlation with pancreas enzymes       ED Treatment:   Medication Administration from 09/10/2018 2230 to 09/11/2018 0458       Date/Time Order Dose Route Action Action by Comments     09/11/2018 0110 HYDROmorphone (DILAUDID) injection 1 mg 1 mg Intravenous Given Bacilio Sensor, RN      09/11/2018 0326 sodium chloride 0 9 % bolus 1,000 mL 0 mL Intravenous Stopped Kandace Mooney RN      09/11/2018 0110 sodium chloride 0 9 % bolus 1,000 mL 1,000 mL Intravenous Gerard Barba, RN      09/11/2018 0110 ondansetron (ZOFRAN) injection 4 mg 4 mg Intravenous Given Bacilio Sensor, RN      09/11/2018 0402 multi-electrolyte (ISOLYTE-S PH 7 4) bolus 1,000 mL 0 mL Intravenous Stopped Kandace Mooney RN      09/11/2018 0301 multi-electrolyte (ISOLYTE-S PH 7 4) bolus 1,000 mL 1,000 mL Intravenous New Bag Kandace Mooney RN      09/11/2018 0427 multi-electrolyte (ISOLYTE-S PH 7 4 equivalent) IV solution 200 mL/hr Intravenous New Bag Kandace Mooney RN      09/11/2018 0214 iohexol (OMNIPAQUE) 350 MG/ML injection (MULTI-DOSE) 100 mL 35 mL Intravenous Given [facility]      09/11/2018 0255 oxyCODONE (ROXICODONE) IR tablet 5 mg 5 mg Oral Given Kandace Mooney RN      09/11/2018 0306 ondansetron (ZOFRAN-ODT) dispersible tablet 4 mg 4 mg Oral Given Kandace Mooney RN      09/11/2018 0331 HYDROmorphone (DILAUDID) injection 0 5 mg 0 5 mg Intravenous Given Samanta Pandya RN           Past Medical/Surgical History:    Active Ambulatory Problems     Diagnosis Date Noted    Pancreatic lesion 02/14/2017    Renal mass 03/02/2017    Smoker 03/02/2017    Leukocytosis 11/16/2017    RBBB 11/16/2017    Epigastric pain 11/15/2017    Acute gastritis without hemorrhage 11/15/2017    Abdominal pain 04/18/2018    Elevated liver enzymes 04/18/2018    Acute on chronic pancreatitis ([place] [id number] ) 04/18/2018    Leukopenia 04/18/2018    Hypertriglyceridemia 04/19/2018    Chronic pain syndrome 05/02/2018    Esophagitis 05/08/2018    Other chronic pancreatitis ([place] [address] ) 05/11/2018    Chronic gastritis without bleeding 32/69/6583    Uncomplicated opioid dependence ([place] [address] ) 06/28/2018    Long-term current use of opiate analgesic 06/28/2018     Past Medical History:   Diagnosis Date    Asthma     Chronic pain disorder     GERD (gastroesophageal reflux disease)     Hyperlipidemia     Liver abscess     Meniscus tear     Pancreatitis     Pneumonia        Admitting Diagnosis: Abdominal pain [R10 9]  Acute on chronic pancreatitis (HCC) [K85 90, K86 1]    Age/Sex: 37 y o  male    Assessment/Plan: 38 yo male admitted from home w/ acute on chronic pancreatitis ,found lipase to be 945  Will admit to [facility]  Make NPO advance diet as colby , cont  Home meds, IV NSS, pain mngt prn and consult GI        Admission Orders:  Scheduled Meds:   Current Facility-Administered Medications:  acetaminophen 650 mg Oral Q6H PRN     atorvastatin 80 mg Oral Daily With Dinner     docusate sodium 100 mg Oral BID PRN     enoxaparin 40 mg Subcutaneous Daily     fenofibrate 145 mg Oral Daily     HYDROmorphone 1 mg Intravenous Q4H PRN x2    multi-electrolyte 200 mL/hr Intravenous Continuous  Last Rate: 200 mL/hr (09/11/18 0427)   niacin 500 mg Oral Daily With Breakfast     nicotine 1 patch Transdermal Daily     ondansetron 4 mg Intravenous Once     ondansetron 4 mg Intravenous Q6H PRN     pancrelipase (Lip-Prot-Amyl) 12,000 Units Oral TID With Meals     pantoprazole 40 mg Oral BID     pregabalin 75 mg Oral TID     sodium chloride 200 mL/hr Intravenous Continuous  Last Rate: 200 mL/hr (09/11/18 1137)   traZODone 50 mg Oral HS       GI consult   NPO   I&O   Daily weight   Up and OOB as colby   9/11 lipid profile, coags, phos ,mg , cmp , lactic acid , lipase    Wbc  12 02, na  133, Co2  11, an gap  21, gluc 154, ronny  7 6, ast  5 9, alb  3 14, wbc 12 02

## 2018-09-11 NOTE — PLAN OF CARE
DISCHARGE PLANNING     Discharge to home or other facility with appropriate resources Progressing        GASTROINTESTINAL - ADULT     Minimal or absence of nausea and/or vomiting Progressing        Knowledge Deficit     Patient/family/caregiver demonstrates understanding of disease process, treatment plan, medications, and discharge instructions Progressing        PAIN - ADULT     Verbalizes/displays adequate comfort level or baseline comfort level Progressing

## 2018-09-11 NOTE — PROGRESS NOTES
Progress Note - Critical Care   Herve Moses 37 y o  male MRN: [id number]  Unit/Bed#: -01 Encounter: [id number]    Assessment:   Principal Problem:    Acute on chronic pancreatitis ([address] )  Active Problems:    Smoker    Leukocytosis    Hypertriglyceridemia    Chronic pain syndrome    GERD (gastroesophageal reflux disease)    Hyponatremia    Plan:      Neuro:   -monitor neuro status   -[facility] ICU   -sleep hygiene  -PRN dilaudid for abdominal pain   CV:   -monitor hemodynamics   -aggressive fluid resuscitation with NS @ 200 ml/hr   -optimize electrolytes   -HD catheter insertion for plasmapheresis    Lung:   -monitor respiratory status   -early mobilization  -incentive spirometry   -smoking cessation    GI:   -acute on chronic pancreatitis secondary to hypertriglyceridemia   -abdominal pain stable, continue with serial exams  -no pseudocyst or abscess noted on CT scan  -will initiate plasmapheresis today   -continue home dose medications  -GI following, appreciate recommendations    FEN:   -monitor electrolytes and replete as necessary   -initiate plasmapheresis    :   -monitor urine output    ID:   -monitor WBCs and temperature curve    Heme:   -lovenox for dvt prophylaxis    Endo:   -monitor glucose levels via BMP    Msk/Skin:   -early mobilization    Disposition:   -transfer to ICU for plasmapheresis     ______________________________________________________________________    Chief Complaint: abdominal pain       HPI/24hr events:   Herve Moses is a 52-year-old male with a history of chronic pancreatitis complicated by pseudocyst in the past, status post celiac plexus block, status post cholecystectomy, tobacco abuse  Patient presented the emergency room on 09/10 for worsening abdominal pain associated with nausea and vomiting since Sunday he was due to follow up with his pain management doctor on 09/17  In the meantime, he was advised to increase Lyrica  His last bowel movement was on Sunday    Patient denied any recent alcohol bingeing  He does drink about 3 beers a week  His last movement was on Sunday  He has history of half a pack a day smoking  Previously, his pancreatitis was thought to be secondary to hypertriglyceridemia  Patient's triglyceride spiked this past Sherin  On this admission, CT of the abdomen revealed fat stranding in the upper abdomen located near the pancreas and gastric antrum  There was no obvious pseudocyst or abscess seen  His lipase was 945 on admission  He takes Crestor as an outpatient  He was started on aggressive IV fluid hydration and his Creon was continued  ______________________________________________________________________    Physical Exam:   PHYSICAL EXAM  General :   Well developed, well nourished, age appropriate affect, behavior, orientation, thought content, thought processes, judgement, and insight  Articulate  English primary language  Neuro:   GCS= 15  CN 2-12 intact  Non Focal  HEENT:  Normocephalic, atraumatic, Pupils 3 brisk bilat  PERRLA, EOMI, hearing grossly intact  Symmetrical facial expressions without droop or slurred speech  Tongue midline without fasiculations  Neck:  No JVD, FROM, No masses/adenopathy  Back:   Symmetrical, atruamatic, spinous process pain free on palpation  Cardiovascular:   No heaves,lifts,thrills  S1/S2 Kevin@[employer] No noted MRGC  Pulmonary:   Symmetrical expansion of chest  Resp even & unlabored  GI :   Abd Soft, generalized tenderness + BSX4 quads  No palp/pulsitile masses  :  N/A  Musculoskeletal:  Symmetrical  No obvious deformity  FROM in UE & LE  Muscle strength 5/5  No lymphadenopathy  Extrem warm to touch  DTR grossly intact  Brachial/radial/femoral/popliteal/pedal/posterior tibial pulses +2  No lesions noted  CN 2-12 grossly intact  No rhomberg   Sensation and motor function intact   _____________________________________________________________________  Vitals:    09/11/18 0402 09/11/18 0510 09/11/18 0600 18 0700   BP: 165/92 165/73  143/67   BP Location: Right arm Right arm  Right arm   Pulse: 99 98  96   Resp:    Temp:  98 5 °F (36 9 °C)  98 5 °F (36 9 °C)   TempSrc:  Oral  Oral   SpO2: 95%   95%   Weight:   94 8 kg (208 lb 15 9 oz)    Height:  5' 11" (1 803 m)       Temperature:   Temp (24hrs), Av 4 °F (36 9 °C), Min:98 2 °F (36 8 °C), Max:98 5 °F (36 9 °C)    Current Temperature: 98 5 °F (36 9 °C)  Weights:   IBW: 75 3 kg    Body mass index is 29 15 kg/m²  Weight (last 2 days)     Date/Time   Weight    18 0600  94 8 (209)    09/10/18 2240  92 5 (204)            Hemodynamic Monitoring:  N/A     Non-Invasive/Invasive Ventilation Settings:  Respiratory    Lab Data (Last 4 hours)    None         O2/Vent Data (Last 4 hours)    None              No results found for: PHART, GJP9GTC, PO2ART, XTC5POX, X5IPCWDM, BEART, SOURCE  SpO2: SpO2: 95 %  Intake and Outputs:  I/O       701 - 09/10 0700 09/10 07 -  07 07 -  0700    IV Piggyback  1000     Total Intake(mL/kg)  1000 (10 5)     Net   +1000                 Nutrition:        Diet Orders            Start     Ordered    18 0500  Diet NPO; Sips with meds  Diet effective now     Question Answer Comment   Diet Type NPO    NPO Except: Sips with meds    RD to adjust diet per protocol?  Yes        18 0459        Labs:     Results from last 7 days  Lab Units 18  0608 18  0059   WBC Thousand/uL 12 02* 15 92*   HEMOGLOBIN g/dL 14 7 16 8   HEMATOCRIT % 44 1 48 0   PLATELETS Thousands/uL 158 207   NEUTROS PCT %  --  79*   MONOS PCT %  --  6      Results from last 7 days  Lab Units 18  0608 18  0059   SODIUM mmol/L 133* 132*   POTASSIUM mmol/L 3 9 5 1   CHLORIDE mmol/L 101 100   CO2 mmol/L 11* 15*   BUN mg/dL 13 17   CREATININE mg/dL 0 93 0 84   CALCIUM mg/dL 7 6* 8 6   ALK PHOS U/L 88 112   ALT U/L 44 51   AST U/L 61* 78*       Results from last 7 days  Lab Units 18  0608   MAGNESIUM mg/dL 1 8       Results from last 7 days  Lab Units 09/11/18  0608   PHOSPHORUS mg/dL 2 8        Results from last 7 days  Lab Units 09/11/18  0608   INR  1 02   PTT seconds 33       Results from last 7 days  Lab Units 09/11/18  0608   LACTIC ACID mmol/L <0 3*       0  Lab Value Date/Time   TROPONINI <0 02 06/21/2018 2208   TROPONINI <0 02 05/08/2018 0957   TROPONINI <0 02 11/29/2017 1952   TROPONINI <0 02 11/16/2017 1018   TROPONINI <0 02 11/16/2017 0752   TROPONINI <0 02 11/16/2017 0539   TROPONINI <0 02 11/15/2017 2227   TROPONINI <0 02 03/02/2017 1358     Imaging:   XR chest portable ICU   Final Result      Right-sided dual-lumen catheter with catheter tip projecting over the superior cavoatrial junction  No pneumothorax  Workstation performed: [id number]         CT abdomen pelvis with contrast   Final Result      Large amount of fat stranding is seen in the upper abdomen centered upon the pancreas and the gastric antrum  Statistically, this is most likely secondary to pancreatitis however the degree of fat stranding extends anteriorly more than is normally seen    for pancreatitis suggesting this may have a gastric or duodenal component; upon review of the patient's prior exams, it is noted patient has had severe gastritis in the past   Recommend correlation with pancreas enzymes  Consider contrast examination    in the appropriate clinical setting  No emphysematous gastritis  No obvious peritoneal collections (pseudocyst or abscess) on this noncontrast examination  No renal stones  No small bowel obstruction  Normal appendix  Workstation performed: [id number]           EKG: NSR   Micro:  Lab Results   Component Value Date    BLOODCX No Growth After 5 Days  03/02/2017    BLOODCX No Growth After 5 Days  03/02/2017     Allergies:    Allergies   Allergen Reactions    Bee Venom Swelling     Medications:   Scheduled Meds:  Current Facility-Administered Medications:  acetaminophen 650 mg Oral Q6H PRN Ezio Edwards PA-C    atorvastatin 80 mg Oral Daily With Bridg, RAY    docusate sodium 100 mg Oral BID PRN Ezio Edwards PA-C    enoxaparin 40 mg Subcutaneous Daily Ezio Edwards PA-C    fenofibrate 145 mg Oral Daily Ezio Edwards PA-C    HYDROmorphone 1 mg Intravenous Q4H PRN Yamilet Payne MD    multi-electrolyte 200 mL/hr Intravenous Continuous Darnell Domínguez MD Last Rate: 200 mL/hr (09/11/18 0427)   niacin 500 mg Oral Daily With Breakfast Ezio Edwards PA-C    nicotine 1 patch Transdermal Daily Ezio Edwards PA-C    ondansetron 4 mg Intravenous Once Darnell Domínguez MD    ondansetron 4 mg Intravenous Q6H PRN Ezio Edwards PA-C    pancrelipase (Lip-Prot-Amyl) 12,000 Units Oral TID With Meals Ezio Edwards PA-C    pantoprazole 40 mg Oral BID Ezio Edwards PA-C    pregabalin 75 mg Oral TID Ezio Edwards PA-C    sodium chloride 200 mL/hr Intravenous Continuous Yamilet Payne MD Last Rate: 200 mL/hr (09/11/18 1137)   traZODone 50 mg Oral HS Ezio Edwards PA-C      Facility-Administered Medications Ordered in Other Encounters:  oxyCODONE-acetaminophen 2 tablet Oral Once Armando Bee MD     Continuous Infusions:  multi-electrolyte 200 mL/hr Last Rate: 200 mL/hr (09/11/18 0427)   sodium chloride 200 mL/hr Last Rate: 200 mL/hr (09/11/18 1137)     PRN Meds:    acetaminophen 650 mg Q6H PRN   docusate sodium 100 mg BID PRN   HYDROmorphone 1 mg Q4H PRN   ondansetron 4 mg Q6H PRN     VTE Pharmacologic Prophylaxis: Sequential compression device (Venodyne)  and Enoxaparin (Lovenox)  VTE Mechanical Prophylaxis: sequential compression device  Invasive lines and devices: Invasive Devices     Peripheral Intravenous Line            Peripheral IV 09/11/18 Left Hand less than 1 day              Counseling / Coordination of Care  Total Critical Care time spent 34 minutes excluding procedures, teaching and family updates        Code Status: Level 1 - Full Code    Portions of the record may have been created with voice recognition software  Occasional wrong word or "sound a like" substitutions may have occurred due to the inherent limitations of voice recognition software  Read the chart carefully and recognize, using context, where substitutions have occurred      LUANA Daniels

## 2018-09-11 NOTE — H&P
[facility] Internal Medicine  H&P- Oral Jackson 1975, 37 y o  male MRN: [id number]    Unit/Bed#: -01 Encounter: [id number]    Primary Care Provider: LUANA Bates   Date and time admitted to hospital: 9/10/2018 11:40 PM        * Acute on chronic pancreatitis [facility])   Assessment & Plan    -Present with epigastric pain nausea and vomiting X 1 day  -Reports that he is currently being followed by GI last visit was about 3 months ago  -No fever, chills  -Lipase level 945  -Admit to med/surg  -NPO for now, will advance as tolerated  -continue home medications  -IV normal saline  -pain management PRN  -GI consult        Hyponatremia   Assessment & Plan    -Sodium at 132  -IV normal saline  -AM CMP        Chronic pain syndrome   Assessment & Plan    -reports being followed by pain management  -recently started on lyrica  -Continue outpatient pain management follow up        GERD (gastroesophageal reflux disease)   Assessment & Plan    -Continue protonix        Hypertriglyceridemia   Assessment & Plan    -Continue home medications        Smoker   Assessment & Plan    -Smoking cessation counseling  -Nicotine patch              VTE Prophylaxis: Enoxaparin (Lovenox)  / sequential compression device   Code Status: Level 1 Full code  POLST: POLST is not applicable to this patient  Discussion with family: None    Anticipated Length of Stay:  Patient will be admitted on an Inpatient basis with an anticipated length of stay of  > 2 midnights  Justification for Hospital Stay: Chronic pancreatitis, Hyponatremia, Leukocytosis    Total Time for Visit, including Counseling / Coordination of Care: 30 minutes  Greater than 50% of this total time spent on direct patient counseling and coordination of care  Chief Complaint:   Abdominal pain    History of Present Illness:    Oral Jackson is a 37 y o  male who presents to the ER complaining of epigastric pain with nausea and vomiting X 1 day   He has hx of chronic pancreatitis and reports being followed by GI  He states that his last episode of severe abdominal pain with nausea an vomiting was three months ago  He continues to see pain management for chronic pan syndrome  He denies SOB, chest pain, fever, chills, dizziness weakness  Review of Systems:    Review of Systems   Constitutional: Negative for activity change, chills, fatigue and fever  HENT: Negative for sore throat, tinnitus, trouble swallowing and voice change  Eyes: Negative for photophobia and visual disturbance  Respiratory: Negative for cough, chest tightness, shortness of breath and wheezing  Cardiovascular: Negative for chest pain, palpitations and leg swelling  Gastrointestinal: Positive for abdominal pain, nausea and vomiting  Negative for diarrhea  Endocrine: Negative for polydipsia, polyphagia and polyuria  Genitourinary: Negative for difficulty urinating, dysuria, flank pain and hematuria  Musculoskeletal: Positive for back pain  Negative for gait problem, neck pain and neck stiffness  Skin: Negative for color change, pallor and rash  Neurological: Negative for dizziness, weakness, light-headedness and headaches  Hematological: Negative for adenopathy  Psychiatric/Behavioral: Negative for agitation, confusion and sleep disturbance  The patient is not nervous/anxious  Past Medical and Surgical History:     Past Medical History:   Diagnosis Date    Asthma     Chronic pain disorder     GERD (gastroesophageal reflux disease)     Hyperlipidemia     Liver abscess     Meniscus tear     left knee work injury last assessed 08/24/2016    Pancreatitis     Pneumonia        Past Surgical History:   Procedure Laterality Date    CHOLECYSTECTOMY      ESOPHAGOGASTRODUODENOSCOPY N/A 11/16/2017    Procedure: ESOPHAGOGASTRODUODENOSCOPY (EGD); Surgeon: Blu Lopez MD;  Location[facility];   Service: Gastroenterology    ESOPHAGOGASTRODUODENOSCOPY N/A 4/19/2018    Procedure: ESOPHAGOGASTRODUODENOSCOPY (EGD); Surgeon: Rm Ochoa MD;  [facility]; Service: Gastroenterology    ESOPHAGOGASTRODUODENOSCOPY N/A 5/10/2018    Procedure: ESOPHAGOGASTRODUODENOSCOPY (EGD); Surgeon: Pablo Hanks MD;  [facility]; Service: Gastroenterology    KNEE ARTHROSCOPY Bilateral     KNEE ARTHROSCOPY Right 2009    cibishino last assessed 08/24/2016   [employer] LIVER SURGERY      NERVE BLOCK Bilateral 5/29/2018    Procedure: SPLANCHNIC NERVE BLOCK;  Surgeon: Harriet Coker MD;  [facility];  Service: Pain Management     PANCREAS SURGERY      stents    AL INJECT NERV [address] Bilateral 5/9/2017    Procedure: CELIAC PLEXUS BLOCK ;  Surgeon: Harriet Coker MD;  [facility];  Service: Pain Management     AL INJECT NERV BLCK,CELIAC PLEXUS Bilateral 6/1/2017    Procedure: SPLANCHNIC NERVE BLOCK at T12;  Surgeon: Harriet Coker MD;  [facility];  Service: Pain Management     AL INJECT NERV BLCK,CELIAC PLEXUS Bilateral 8/8/2017    Procedure: BILATERAL SPLANCHNIC NERVE BLOCK T12;  Surgeon: Harriet Coker MD;  [facility];  Service: Pain Management     AL INJECT NERV Letty Goodpasture Bilateral 12/28/2017    Procedure: SPLANCHNIC NERVE BLOCK;  Surgeon: Harriet Coker MD;  [facility];  Service: Pain Management     AL LAP,CHOLECYSTECTOMY N/A 2/14/2017    Procedure: LAPAROSCOPIC CHOLECYSTECTOMY, IOC, POSSIBLE OPEN ;  Surgeon: Estephanie Torres MD;  [facility];  Service: General    ROTATOR CUFF REPAIR Right     SHOULDER ARTHROSCOPY      SHOULDER ARTHROSCOPY Right        Meds/Allergies:    Prior to Admission medications    Medication Sig Start Date End Date Taking?  Authorizing Provider   Choline Fenofibrate (FENOFIBRIC ACID) 135 MG CPDR Take 1 capsule by mouth daily 9/6/16  Yes Historical Provider, BLAKE BROWN, PO Take 1,000 mg by mouth 3 (three) times a day   Yes Historical Provider, MD   niacin (NIASPAN) 500 mg CR tablet TAKE ONE TABLET BY MOUTH EVERY DAY WITH BREAKFAST  8/20/18  Yes LUANA Frey   pancrelipase, Lip-Prot-Amyl, (CREON) 12,000 units capsule Take 12,000 units of lipase by mouth 3 (three) times a day with meals   4/13/17  Yes Historical Provider, MD   Pancrelipase, Lip-Prot-Amyl, (CREON) 18024 units CPEP Take 1 capsule (36,000 Units total) by mouth 3 (three) times a day before meals 3/19/18  Yes Dinora Son PA-C   ranitidine (ZANTAC) 150 MG capsule Take 150 mg by mouth 2 (two) times a day   Yes Historical Provider, MD   rosuvastatin (CRESTOR) 40 MG tablet Take 1 tablet (40 mg total) by mouth daily 8/9/18  Yes LUANA Frey   traZODone (DESYREL) 50 mg tablet Take 1 tablet (50 mg total) by mouth daily at bedtime 5/1/18  Yes LUANA Frey   VENTOLIN  (90 Base) MCG/ACT inhaler INHALE 1 TO 2 PUFFS BY MOUTH EVERY 4 TO 6 HOURS as needed  7/16/18  Yes LUANA Frey   rosuvastatin (CRESTOR) 20 MG tablet TAKE ONE TABLET BY MOUTH EVERY DAY  9/10/18 9/11/18 Yes LUANA Frey   acetaminophen (TYLENOL) 325 mg tablet Take 2 tablets (650 mg total) by mouth every 6 (six) hours as needed for fever 5/11/18   Lawence Cheeks, DO   EPINEPHrine (EPIPEN 2-LARRY) 0 3 mg/0 3 mL SOAJ EpiPen 2-Larry 0 3 MG/0 3ML Injection Solution Auto-injector  INJECT 0 3ML INTRAMUSCULARLY AS DIRECTED     Quantity: 1;  Refills: 1      Toro Briceño ; Active  2 Solution Auto-injector Pen    Historical Provider, MD   niacin (NIASPAN) 500 mg CR tablet Take 1 tablet (500 mg total) by mouth daily with breakfast for 90 days 6/12/18 9/10/18  LUANA Frey   omega-3-acid ethyl esters (LOVAZA) 1 g capsule Take 2 capsules (2 g total) by mouth 2 (two) times a day for 90 days 4/9/18 7/8/18  LUANA Frey   ondansetron (ZOFRAN-ODT) 4 mg disintegrating tablet DISSOLVE ONE TABLET IN MOUTH EVERY 8 HOURS AS NEEDED FOR NAUSEA AND VOMITING  9/4/18   LUANA Frey   oxyCODONE (ROXICODONE) 5 mg immediate release tablet Take 1 tablet (5 mg total) by mouth 3 (three) times a day as needed for moderate pain for up to 30 days Max Daily Amount: 15 mg 6/28/18 7/28/18  Sharyn Felder MD   pantoprazole (PROTONIX) 40 mg tablet Take 1 tablet (40 mg total) by mouth 2 (two) times a day for 90 days 6/12/18 9/10/18  LUANA Mlyes   pregabalin (LYRICA) 75 mg capsule Take 1 capsule (75 mg total) by mouth 3 (three) times a day for 30 days  Patient taking differently: Take 150 mg by mouth 2 (two) times a day   7/26/18 8/25/18  Sharyn Felder MD   choline fenofibrate (TRILIPIX) 135 MG capsule TAKE ONE CAPSULE BY MOUTH ONCE A DAY  8/20/18 9/11/18  LUANA Myles   cyclobenzaprine (FLEXERIL) 10 mg tablet Take 1 tablet (10 mg total) by mouth 3 (three) times a day as needed for muscle spasms for up to 30 days 6/13/18 9/11/18  Sharyn Felder MD   docusate sodium (COLACE) 100 mg capsule Take 1 capsule (100 mg total) by mouth 2 (two) times a day as needed for constipation 5/11/18 9/11/18  Alec Silveira DO   nicotine (NICODERM CQ) 21 mg/24 hr TD 24 hr patch Place 1 patch on the skin daily 6/27/18 9/11/18  Chelly Richmond MD     I have reviewed home medications with patient personally  Allergies:    Allergies   Allergen Reactions    Bee Venom Swelling       Social History:     Marital Status: /Civil Union   Occupation:   Patient Pre-hospital Living Situation: Lives with family  Patient Pre-hospital Level of Mobility: active  Patient Pre-hospital Diet Restrictions: None reported  Substance Use History:   History   Alcohol Use    0 6 oz/week    1 Cans of beer per week     Comment: rarely, occasionally, history of no alcohol use per allscripts     History   Smoking Status    Current Every Day Smoker    Packs/day: 0 50    Years: 20 00   Smokeless Tobacco    Never Used     History   Drug Use No       Family History:    Family History   Problem Relation Age of Onset    Cirrhosis Mother     Heart disease Other cardiac disorder    Cancer Other        Physical Exam:     Vitals:   Blood Pressure: 165/73 (09/11/18 0510)  Pulse: 98 (09/11/18 0510)  Temperature: 98 5 °F (36 9 °C) (09/11/18 0510)  Temp Source: Oral (09/11/18 0510)  Respirations: 20 (09/11/18 0510)  Height: 5' 11" (180 3 cm) (09/11/18 0510)  Weight - Scale: 94 8 kg (208 lb 15 9 oz) (09/11/18 0600)  SpO2: 95 % (09/11/18 0402)    Physical Exam   Constitutional: He is oriented to person, place, and time  He appears well-developed and well-nourished  He appears distressed  HENT:   Head: Normocephalic and atraumatic  Mouth/Throat: Oropharynx is clear and moist    Eyes: EOM are normal  Pupils are equal, round, and reactive to light  Right eye exhibits no discharge  Left eye exhibits no discharge  Neck: Normal range of motion  Neck supple  No JVD present  Cardiovascular: Regular rhythm and intact distal pulses  Exam reveals no friction rub  No murmur heard  tachycardic   Pulmonary/Chest: Effort normal and breath sounds normal  No stridor  No respiratory distress  He has no wheezes  He has no rales  Abdominal: Soft  He exhibits no distension  There is tenderness  There is no rebound  Musculoskeletal: Normal range of motion  He exhibits no edema, tenderness or deformity  Neurological: He is oriented to person, place, and time  Skin: Skin is warm and dry  No rash noted  He is not diaphoretic  No erythema  No pallor  Psychiatric: He has a normal mood and affect  Vitals reviewed  Additional Data:     Lab Results: I have personally reviewed pertinent reports          Results from last 7 days  Lab Units 09/11/18  0608 09/11/18  0059   WBC Thousand/uL 12 02* 15 92*   HEMOGLOBIN g/dL 14 7 16 8   HEMATOCRIT % 44 1 48 0   PLATELETS Thousands/uL 158 207   NEUTROS PCT %  --  79*   LYMPHS PCT %  --  14   MONOS PCT %  --  6   EOS PCT %  --  1       Results from last 7 days  Lab Units 09/11/18  0059   SODIUM mmol/L 132*   POTASSIUM mmol/L 5 1 CHLORIDE mmol/L 100   CO2 mmol/L 15*   BUN mg/dL 17   CREATININE mg/dL 0 84   CALCIUM mg/dL 8 6   ALK PHOS U/L 112   ALT U/L 51   AST U/L 78*       Results from last 7 days  Lab Units 09/11/18  0608   INR  1 02               Imaging: I have personally reviewed pertinent reports  CT abdomen pelvis with contrast   Final Result by Haven Schafer MD (09/11 0230)      Large amount of fat stranding is seen in the upper abdomen centered upon the pancreas and the gastric antrum  Statistically, this is most likely secondary to pancreatitis however the degree of fat stranding extends anteriorly more than is normally seen    for pancreatitis suggesting this may have a gastric or duodenal component; upon review of the patient's prior exams, it is noted patient has had severe gastritis in the past   Recommend correlation with pancreas enzymes  Consider contrast examination    in the appropriate clinical setting  No emphysematous gastritis  No obvious peritoneal collections (pseudocyst or abscess) on this noncontrast examination  No renal stones  No small bowel obstruction  Normal appendix  Workstation performed: [id number]             EKG, Pathology, and Other Studies Reviewed on Admission:   · EKG: none    Allscripts / Epic Records Reviewed: Yes     ** Please Note: This note has been constructed using a voice recognition system   **

## 2018-09-11 NOTE — PLAN OF CARE
Problem: DISCHARGE PLANNING - CARE MANAGEMENT  Goal: Discharge to post-acute care or home with appropriate resources  INTERVENTIONS:  - Conduct assessment to determine patient/family and health care team treatment goals, and need for post-acute services based on payer coverage, community resources, and patient preferences, and barriers to discharge  - Address psychosocial, clinical, and financial barriers to discharge as identified in assessment in conjunction with the patient/family and health care team  - Arrange appropriate level of post-acute services according to patients   needs and preference and payer coverage in collaboration with the physician and health care team  - Communicate with and update the patient/family, physician, and health care team regarding progress on the discharge plan  - Arrange appropriate transportation to post-acute venues  Outcome: Progressing  CM met with pt at bedside  Pt lives in [facility] with his wife, Miranda Church  Pt denies DME and completes ADL's independently  Pt denies hx of rehab and [facility]  Pt uses [facility]  Pt denies hx of MH and SA  Pt's wife is POA and he does not have AD  Pt declined information  Pt works as   CM reviewed discharge planning process including the following: identifying help at home, patient preference for discharge planning needs, pharmacy preference, and availability of treatment team to discuss questions or concerns patient and/or family may have regarding understanding medications and recognizing signs and symptoms once discharged  CM also encouraged patient to follow up with all recommended appointments after discharge  Patient advised of importance for patient and family to participate in managing patients medical well being  CM name and role reviewed  Discharge Checklist reviewed and CM will continue to monitor for progress toward discharge goals in nursing and provider rounds  Pt has no needs at this time   Cm department to follow pt through dc

## 2018-09-11 NOTE — ASSESSMENT & PLAN NOTE
-reports being followed by pain management  -recently started on lyrica  -Continue outpatient pain management follow up

## 2018-09-11 NOTE — PROCEDURES
Central Line Insertion  Date/Time: 9/11/2018 4:54 PM  Performed by: Valeriy Crowder  Authorized by: Valeriy Crowder     Patient location:  Bedside  Consent:     Consent obtained:  Written (obtained by Dr Abel Rivera)    Consent given by:  Patient    Risks discussed:  Arterial puncture, incorrect placement, nerve damage, infection, pneumothorax and bleeding    Alternatives discussed:  No treatment  Universal protocol:     Procedure explained and questions answered to patient or proxy's satisfaction: yes      Relevant documents present and verified: yes      Test results available and properly labeled: yes      Radiology Images displayed and confirmed  If images not available, report reviewed: yes      Required blood products, implants, devices, and special equipment available: yes      Site/side marked: yes      Immediately prior to procedure, a time out was called: yes      Patient identity confirmed:  Hospital-assigned identification number, verbally with patient and arm band  Pre-procedure details:     Hand hygiene: Hand hygiene performed prior to insertion      Sterile barrier technique: All elements of maximal sterile technique followed      Skin preparation:  2% chlorhexidine    Skin preparation agent: Skin preparation agent completely dried prior to procedure    Indications:     Central line indications: other (comment)      Central line indications comment:  Plasmapheresis  Anesthesia (see MAR for exact dosages):      Anesthesia method:  Local infiltration    Local anesthetic:  Lidocaine 1% w/o epi  Procedure details:     Location:  Right internal jugular    Vessel type: vein      Laterality:  Right    Approach: percutaneous technique used      Patient position:  Trendelenburg    Catheter type:  Triple lumen 16cm    Catheter size:  12 Fr    Landmarks identified: yes      Ultrasound guidance: yes      Sterile ultrasound techniques: Sterile gel and sterile probe covers were used      Number of attempts:  1 Successful placement: yes    Post-procedure details:     Post-procedure:  Dressing applied and line sutured    Assessment:  Blood return through all ports and no pneumothorax on x-ray    Patient tolerance of procedure:   Tolerated well, no immediate complications

## 2018-09-12 LAB
ALBUMIN SERPL BCP-MCNC: 3.2 G/DL (ref 3.5–5)
ALP SERPL-CCNC: 22 U/L (ref 46–116)
ALT SERPL W P-5'-P-CCNC: 19 U/L (ref 12–78)
ANION GAP SERPL CALCULATED.3IONS-SCNC: 6 MMOL/L (ref 4–13)
AST SERPL W P-5'-P-CCNC: 15 U/L (ref 5–45)
BASOPHILS # BLD AUTO: 0.01 THOUSANDS/ΜL (ref 0–0.1)
BASOPHILS NFR BLD AUTO: 0 % (ref 0–1)
BILIRUB SERPL-MCNC: 1.8 MG/DL (ref 0.2–1)
BUN SERPL-MCNC: 7 MG/DL (ref 5–25)
CA-I BLD-SCNC: 0.92 MMOL/L (ref 1.12–1.32)
CALCIUM SERPL-MCNC: 7 MG/DL (ref 8.3–10.1)
CHLORIDE SERPL-SCNC: 102 MMOL/L (ref 100–108)
CO2 SERPL-SCNC: 25 MMOL/L (ref 21–32)
CREAT SERPL-MCNC: 0.73 MG/DL (ref 0.6–1.3)
EOSINOPHIL # BLD AUTO: 0.13 THOUSAND/ΜL (ref 0–0.61)
EOSINOPHIL NFR BLD AUTO: 2 % (ref 0–6)
ERYTHROCYTE [DISTWIDTH] IN BLOOD BY AUTOMATED COUNT: 13.4 % (ref 11.6–15.1)
GFR SERPL CREATININE-BSD FRML MDRD: 112 ML/MIN/1.73SQ M
GLUCOSE SERPL-MCNC: 109 MG/DL (ref 65–140)
HCT VFR BLD AUTO: 40.1 % (ref 36.5–49.3)
HGB BLD-MCNC: 13.3 G/DL (ref 12–17)
IMM GRANULOCYTES # BLD AUTO: 0.03 THOUSAND/UL (ref 0–0.2)
IMM GRANULOCYTES NFR BLD AUTO: 0 % (ref 0–2)
LYMPHOCYTES # BLD AUTO: 1.24 THOUSANDS/ΜL (ref 0.6–4.47)
LYMPHOCYTES NFR BLD AUTO: 15 % (ref 14–44)
MCH RBC QN AUTO: 32.2 PG (ref 26.8–34.3)
MCHC RBC AUTO-ENTMCNC: 33.2 G/DL (ref 31.4–37.4)
MCV RBC AUTO: 97 FL (ref 82–98)
MONOCYTES # BLD AUTO: 0.59 THOUSAND/ΜL (ref 0.17–1.22)
MONOCYTES NFR BLD AUTO: 7 % (ref 4–12)
NEUTROPHILS # BLD AUTO: 6.13 THOUSANDS/ΜL (ref 1.85–7.62)
NEUTS SEG NFR BLD AUTO: 76 % (ref 43–75)
NRBC BLD AUTO-RTO: 0 /100 WBCS
PLATELET # BLD AUTO: 93 THOUSANDS/UL (ref 149–390)
PMV BLD AUTO: 10.6 FL (ref 8.9–12.7)
POTASSIUM SERPL-SCNC: 3.3 MMOL/L (ref 3.5–5.3)
PROT SERPL-MCNC: 5 G/DL (ref 6.4–8.2)
RBC # BLD AUTO: 4.13 MILLION/UL (ref 3.88–5.62)
SODIUM SERPL-SCNC: 133 MMOL/L (ref 136–145)
TRIGL SERPL-MCNC: 262 MG/DL
WBC # BLD AUTO: 8.13 THOUSAND/UL (ref 4.31–10.16)

## 2018-09-12 PROCEDURE — 84478 ASSAY OF TRIGLYCERIDES: CPT | Performed by: PHYSICIAN ASSISTANT

## 2018-09-12 PROCEDURE — 85025 COMPLETE CBC W/AUTO DIFF WBC: CPT | Performed by: PHYSICIAN ASSISTANT

## 2018-09-12 PROCEDURE — 82330 ASSAY OF CALCIUM: CPT | Performed by: PHYSICIAN ASSISTANT

## 2018-09-12 PROCEDURE — 80053 COMPREHEN METABOLIC PANEL: CPT | Performed by: PHYSICIAN ASSISTANT

## 2018-09-12 PROCEDURE — 99233 SBSQ HOSP IP/OBS HIGH 50: CPT | Performed by: INTERNAL MEDICINE

## 2018-09-12 RX ADMIN — SODIUM CHLORIDE 200 ML/HR: 0.9 INJECTION, SOLUTION INTRAVENOUS at 14:42

## 2018-09-12 RX ADMIN — SODIUM CHLORIDE 200 ML/HR: 0.9 INJECTION, SOLUTION INTRAVENOUS at 19:41

## 2018-09-12 RX ADMIN — PREGABALIN 75 MG: 75 CAPSULE ORAL at 22:03

## 2018-09-12 RX ADMIN — PANCRELIPASE 12000 UNITS: 30000; 6000; 19000 CAPSULE, DELAYED RELEASE PELLETS ORAL at 12:06

## 2018-09-12 RX ADMIN — TRAZODONE HYDROCHLORIDE 50 MG: 50 TABLET, FILM COATED ORAL at 22:03

## 2018-09-12 RX ADMIN — HYDROMORPHONE HYDROCHLORIDE 1 MG: 1 INJECTION, SOLUTION INTRAMUSCULAR; INTRAVENOUS; SUBCUTANEOUS at 10:05

## 2018-09-12 RX ADMIN — NICOTINE 1 PATCH: 21 PATCH TRANSDERMAL at 08:33

## 2018-09-12 RX ADMIN — ENOXAPARIN SODIUM 40 MG: 40 INJECTION SUBCUTANEOUS at 08:35

## 2018-09-12 RX ADMIN — NIACIN 500 MG: 500 TABLET, FILM COATED, EXTENDED RELEASE ORAL at 07:30

## 2018-09-12 RX ADMIN — HYDROMORPHONE HYDROCHLORIDE 1 MG: 1 INJECTION, SOLUTION INTRAMUSCULAR; INTRAVENOUS; SUBCUTANEOUS at 14:19

## 2018-09-12 RX ADMIN — PANTOPRAZOLE SODIUM 40 MG: 40 TABLET, DELAYED RELEASE ORAL at 06:08

## 2018-09-12 RX ADMIN — PANCRELIPASE 12000 UNITS: 30000; 6000; 19000 CAPSULE, DELAYED RELEASE PELLETS ORAL at 07:30

## 2018-09-12 RX ADMIN — SODIUM CHLORIDE 200 ML/HR: 0.9 INJECTION, SOLUTION INTRAVENOUS at 05:06

## 2018-09-12 RX ADMIN — HYDROMORPHONE HYDROCHLORIDE 1 MG: 1 INJECTION, SOLUTION INTRAMUSCULAR; INTRAVENOUS; SUBCUTANEOUS at 06:08

## 2018-09-12 RX ADMIN — PANTOPRAZOLE SODIUM 40 MG: 40 TABLET, DELAYED RELEASE ORAL at 16:53

## 2018-09-12 RX ADMIN — PANCRELIPASE 12000 UNITS: 30000; 6000; 19000 CAPSULE, DELAYED RELEASE PELLETS ORAL at 16:51

## 2018-09-12 RX ADMIN — HYDROMORPHONE HYDROCHLORIDE 1 MG: 1 INJECTION, SOLUTION INTRAMUSCULAR; INTRAVENOUS; SUBCUTANEOUS at 22:02

## 2018-09-12 RX ADMIN — PREGABALIN 75 MG: 75 CAPSULE ORAL at 08:40

## 2018-09-12 RX ADMIN — FENOFIBRATE 145 MG: 145 TABLET, FILM COATED ORAL at 08:35

## 2018-09-12 RX ADMIN — HYDROMORPHONE HYDROCHLORIDE 1 MG: 1 INJECTION, SOLUTION INTRAMUSCULAR; INTRAVENOUS; SUBCUTANEOUS at 02:06

## 2018-09-12 RX ADMIN — ATORVASTATIN CALCIUM 80 MG: 40 TABLET, FILM COATED ORAL at 16:53

## 2018-09-12 RX ADMIN — HYDROMORPHONE HYDROCHLORIDE 1 MG: 1 INJECTION, SOLUTION INTRAMUSCULAR; INTRAVENOUS; SUBCUTANEOUS at 18:05

## 2018-09-12 RX ADMIN — ONDANSETRON 4 MG: 2 INJECTION INTRAMUSCULAR; INTRAVENOUS at 10:10

## 2018-09-12 RX ADMIN — SODIUM CHLORIDE 200 ML/HR: 0.9 INJECTION, SOLUTION INTRAVENOUS at 10:10

## 2018-09-12 RX ADMIN — PREGABALIN 75 MG: 75 CAPSULE ORAL at 16:53

## 2018-09-12 NOTE — PROGRESS NOTES
Progress Note - Critical Care   Cruzito Sara 37 y o  male MRN: [id number]  Unit/Bed#:  Encounter: [id number]    Attending Physician: Perla Ribeiro MD      ______________________________________________________________________  Assessment and Plan:   Principal Problem:    Acute on chronic pancreatitis [facility])  Active Problems:    Smoker    Leukocytosis    Hypertriglyceridemia    Chronic pain syndrome    GERD (gastroesophageal reflux disease)    Hyponatremia  Resolved Problems:    * No resolved hospital problems  *        Neuro:   [facility] ICU  Sleep hygiene  Abdominal pain-continue p r n  Dilaudid    CV:   Continue to monitor hemodynamics closely, and continue aggressive fluid resuscitation    Pulm: Tobacco abuse-recommend smoking sensation  Monitor respiratory status closely, encourage early mobilization and incentive spirometry    GI:   Acute on chronic pancreatitis secondary to hypertriglyceridemia-continue with serial abdominal exams, underwent plasmapheresis yesterday with significant improvement in triglycerides, continue Creon and home medication, GI following-appreciate their input    :   Follow renal indices in urine outpu    F/E/N:   Monitor electrolytes and replace as neede    ID:   Follow WBC and fever curve    Heme:   Lovenox for DVT prophylaxis    Endo:   Monitor glucose levels on BMP     Msk/Skin:   Out of bed as able, early mobilization    Disposition:   Transfer back to [facility] today    Code Status: Level 1 - Full Code    ______________________________________________________________________    Chief Complaint:  Abdominal pain    24 Hour Events:   Admitted to Internal Medicine service yesterday with acute on chronic pancreatitis secondary to hypertriglyceridemia  His triglycerides peaked at 1655 hours and patient was transferred to the step-down unit for plasmapheresis  A line was placed and he was for pharesed yesterday and his triglycerides this morning are 262      Review of Systems Gastrointestinal: Positive for abdominal pain      ______________________________________________________________  Physical Exam   Constitutional: Oriented to person, place, and time and well-developed, well-nourished, and in no acute distress  Head: Normocephalic and atraumatic  Eyes: Conjunctivae are normal  Pupils are equal, round, and reactive to light  Neck: Neck supple  No JVD present  Cardiovascular: Normal rate, regular rhythm and normal heart sounds  Exam reveals no gallop and no friction rub  No murmur heard  Pulmonary/Chest: Effort normal and breath sounds normal    Abdominal: Soft  Bowel sounds are normal  No distension  + generalized tenderness  Back: Symmetric, no CVA tenderness  Genitourinary: Deferred   Musculoskeletal: Normal range of motion  No clubbing, cyanosis, or edema  Lymphadenopathy:   No cervical adenopathy  Neurological: Alert and oriented to person, place, and time  No cranial nerve deficit  GCS score is 15  Skin: Skin is warm and dry  No rash noted  Psychiatric: Mood and affect normal      ______________________________________________________________________  Vitals:    18 0300 18 0533 18 0700 18 0734   BP: 123/76  137/72 137/74   BP Location: Right arm  Right arm Right arm   Pulse: 95  97 101   Resp:  18   Temp: 98 3 °F (36 8 °C)   100 °F (37 8 °C)   TempSrc: Oral   Oral   SpO2: 96%  91% 94%   Weight:  95 2 kg (209 lb 14 1 oz)     Height:           Temperature:   Temp (24hrs), Av 5 °F (37 5 °C), Min:98 2 °F (36 8 °C), Max:100 9 °F (38 3 °C)    Current Temperature: 100 °F (37 8 °C)  Weights:   IBW: 75 3 kg    Body mass index is 29 27 kg/m²    Weight (last 2 days)     Date/Time   Weight    18 0533  95 2 (209 88)    18 0600  94 8 (209)    09/10/18 2240  92 5 (204)            Hemodynamic Monitoring:  N/A     Non-Invasive/Invasive Ventilation Settings:  Respiratory    Lab Data (Last 4 hours)    None         O2/Vent Data (Last 4 hours)    None              No results found for: PHART, KUE1IFT, PO2ART, ELK2URC, Q6ZSXAHO, BEART, SOURCE  SpO2: SpO2: 94 %  Intake and Outputs:  I/O       09/10 0701 - 09/11 0700 09/11 0701 - 09/12 0700 09/12 0701 - 09/13 0700    I V  (mL/kg)  2800 (29 4)     IV Piggyback 1000      Total Intake(mL/kg) 1000 (10 5) 2800 (29 4)     Urine (mL/kg/hr)  600 (0 3)     Total Output   600      Net +1000 +2200                   Nutrition:        Diet Orders            Start     Ordered    09/11/18 0500  Diet NPO; Sips with meds  Diet effective now     Question Answer Comment   Diet Type NPO    NPO Except: Sips with meds    RD to adjust diet per protocol?  Yes        09/11/18 0459          Labs:     Results from last 7 days  Lab Units 09/12/18  0445 09/11/18  0608 09/11/18  0059   WBC Thousand/uL 8 13 12 02* 15 92*   HEMOGLOBIN g/dL 13 3 14 7 16 8   HEMATOCRIT % 40 1 44 1 48 0   PLATELETS Thousands/uL 93* 158 207   NEUTROS PCT % 76*  --  79*   MONOS PCT % 7  --  6       Results from last 7 days  Lab Units 09/11/18  0608 09/11/18  0059   SODIUM mmol/L 133* 132*   POTASSIUM mmol/L 3 9 5 1   CHLORIDE mmol/L 101 100   CO2 mmol/L 11* 15*   BUN mg/dL 13 17   CREATININE mg/dL 0 93 0 84   CALCIUM mg/dL 7 6* 8 6   ALK PHOS U/L 88 112   ALT U/L 44 51   AST U/L 61* 78*       Results from last 7 days  Lab Units 09/11/18  0608   MAGNESIUM mg/dL 1 8     Lab Results   Component Value Date    PHOS 2 8 09/11/2018        Results from last 7 days  Lab Units 09/11/18  0608   INR  1 02   PTT seconds 33       0  Lab Value Date/Time   TROPONINI <0 02 06/21/2018 2208   TROPONINI <0 02 05/08/2018 0957   TROPONINI <0 02 11/29/2017 1952   TROPONINI <0 02 11/16/2017 1018   TROPONINI <0 02 11/16/2017 0752   TROPONINI <0 02 11/16/2017 0539   TROPONINI <0 02 11/15/2017 2227   TROPONINI <0 02 03/02/2017 1358       Results from last 7 days  Lab Units 09/11/18  0608   LACTIC ACID mmol/L <0 3*     ABG:No results found for: PHART, FKE1VZA, PO2ART, WHU5AQQ, O0JNNMKM, BEART, SOURCE  Imaging:   XR chest portable ICU   Final Result      Right-sided dual-lumen catheter with catheter tip projecting over the superior cavoatrial junction  No pneumothorax  Workstation performed: [id number]         CT abdomen pelvis with contrast   Final Result      Large amount of fat stranding is seen in the upper abdomen centered upon the pancreas and the gastric antrum  Statistically, this is most likely secondary to pancreatitis however the degree of fat stranding extends anteriorly more than is normally seen    for pancreatitis suggesting this may have a gastric or duodenal component; upon review of the patient's prior exams, it is noted patient has had severe gastritis in the past   Recommend correlation with pancreas enzymes  Consider contrast examination    in the appropriate clinical setting  No emphysematous gastritis  No obvious peritoneal collections (pseudocyst or abscess) on this noncontrast examination  No renal stones  No small bowel obstruction  Normal appendix  Workstation performed: [id number]            I have personally reviewed pertinent reports  EKG:  Sinus tachycardia  Micro:  Lab Results   Component Value Date    BLOODCX No Growth After 5 Days  03/02/2017    BLOODCX No Growth After 5 Days  03/02/2017     Allergies:    Allergies   Allergen Reactions    Bee Venom Swelling     Medications:   Scheduled Meds:  Current Facility-Administered Medications:  acetaminophen 650 mg Oral Q6H PRN Ezio Edwards PA-C    atorvastatin 80 mg Oral Daily With Dinner Ezio Edwards PA-C    docusate sodium 100 mg Oral BID PRN Ezio Edwards PA-C    enoxaparin 40 mg Subcutaneous Daily Ezio Edwards PA-C    fenofibrate 145 mg Oral Daily Ezio Edwards PA-C    HYDROmorphone 1 mg Intravenous Q4H PRN Evan Rodriguez MD    niacin 500 mg Oral Daily With Breakfast Ezio Edwards PA-C    nicotine 1 patch Transdermal Daily Ezio Edwards PA-C    ondansetron 4 mg Intravenous Q6H PRN Ezio Edwards PA-C    pancrelipase (Lip-Prot-Amyl) 12,000 Units Oral TID With Meals Ezio Edwards PA-C    pantoprazole 40 mg Oral BID Ezio Edwards PA-C    pregabalin 75 mg Oral TID Ezio Edwards PA-C    sodium chloride 200 mL/hr Intravenous Continuous Jake Sequeira MD Last Rate: 200 mL/hr (09/12/18 0506)   traZODone 50 mg Oral HS Ezio Edwards PA-C      Facility-Administered Medications Ordered in Other Encounters:  oxyCODONE-acetaminophen 2 tablet Oral Once Arelis Morales MD     Continuous Infusions:  sodium chloride 200 mL/hr Last Rate: 200 mL/hr (09/12/18 0506)     PRN Meds:    acetaminophen 650 mg Q6H PRN   docusate sodium 100 mg BID PRN   HYDROmorphone 1 mg Q4H PRN   ondansetron 4 mg Q6H PRN     VTE Pharmacologic Prophylaxis: Enoxaparin (Lovenox)  VTE Mechanical Prophylaxis: sequential compression device  Invasive lines and devices: Invasive Devices     Central Venous Catheter Line            CVC Central Lines 09/11/18 Triple 16cm less than 1 day          Peripheral Intravenous Line            Peripheral IV 09/11/18 Left Hand 1 day                     Portions of the record may have been created with voice recognition software  Occasional wrong word or "sound a like" substitutions may have occurred due to the inherent limitations of voice recognition software  Read the chart carefully and recognize, using context, where substitutions have occurred      Mac Semen

## 2018-09-12 NOTE — PROGRESS NOTES
Progress Note - ICU Transfer to SD/MS tele   Nica Keene 37 y o  male MRN: [id number]  [facility]   Unit/Bed#:  Encounter: [id number]    Code Status: Level 1 - Full Code  POA:    POLST:      Reason for ICU admission:  Acute pancreatitis secondary to hypertriglyceridemia    Active problems:   Principal Problem:    Acute on chronic pancreatitis ([address] )  Active Problems:    Smoker    Leukocytosis    Hypertriglyceridemia    Chronic pain syndrome    GERD (gastroesophageal reflux disease)    Hyponatremia  Resolved Problems:    * No resolved hospital problems  *      Consultants:  GI    History of Present Illness: Nica Keene is a 37 y o  male who presents to the ER complaining of epigastric pain with nausea and vomiting X 1 day  He has hx of chronic pancreatitis and reports being followed by GI  He states that his last episode of severe abdominal pain with nausea an vomiting was three months ago  He continues to see pain management for chronic pan syndrome  He denies SOB, chest pain, fever, chills, dizziness weakness  Summary of clinical course: The patient was admitted to the internal medicine service  His triglycerides peaked at 1655 hours and so he was transferred to the intensive care unit for line placement and plasmapheresis  He underwent plasmapheresis and his triglycerides improved to 262  GI is following for his acute on chronic pancreatitis        Recent or scheduled procedures: none    Outstanding/pending diagnostics: none    Cultures:  None       Mobilization Plan:  Out of bed as able    Nutrition Plan:  NPO    Discharge Plan:   Patient should be ready for discharge to home on home meds after acute pancreatitis has resolved    Initial Physical Therapy Recommendations:  Not applicable  Initial Occupational Therapy Recommendations:  Not applicable  Initial /Plan:  Pending    Home medications that are not reordered and reason why:  Changed to our formulary meds     Specific Diagnosis Plan:  Acute on chronic pancreatitis secondary to hypertriglyceridemia-continue with serial abdominal exams, underwent plasmapheresis yesterday with significant improvement in triglycerides, continue Creon and home medication, GI following-appreciate their input    Spoke with Dr Evelyn Bowen regarding transfer  Please call [phone] with any questions or concerns  Portions of the record may have been created with voice recognition software  Occasional wrong word or "sound a like" substitutions may have occurred due to the inherent limitations of voice recognition software  Read the chart carefully and recognize, using context, where substitutions have occurred      Tamika Zepeda

## 2018-09-12 NOTE — PROGRESS NOTES
Progress Note - Vineet Ceja 37 y o  male MRN: [id number]    Unit/Bed#:  Encounter: [id number]        Subjective:     Patient underwent plasmapheresis yesterday  Patient also had an NG tube temporarily placed in order to relieve abdominal distension  Patient reports that his abdominal pain is improved since admission  He denies nausea or vomiting  He has not had a bowel movement yet  ROS: As noted in the HPI, otherwise all others negative  Objective:     Vitals: Blood pressure 137/74, pulse 101, temperature 100 °F (37 8 °C), temperature source Oral, resp  rate 18, height 5' 11" (1 803 m), weight 95 2 kg (209 lb 14 1 oz), SpO2 94 %  ,Body mass index is 29 27 kg/m²  Intake/Output Summary (Last 24 hours) at 09/12/18 1006  Last data filed at 09/12/18 0413   Gross per 24 hour   Intake             2800 ml   Output              600 ml   Net             2200 ml       Physical Exam:     General Appearance: Alert and oriented x 3   In no respiratory distress  Lungs: Clear to auscultation bilaterally  Heart: Regular rate and rhythm  Abdomen: Soft, upper abdominal tenderness without guarding,   Moderately distended; bowel sounds normal; no masses or no organomegaly  Extremities: No cyanosis, edema    Invasive Devices     Central Venous Catheter Line            CVC Central Lines 09/11/18 Triple 16cm less than 1 day          Peripheral Intravenous Line            Peripheral IV 09/11/18 Left Hand 1 day                Lab Results:    Results from last 7 days  Lab Units 09/12/18  0445   WBC Thousand/uL 8 13   HEMOGLOBIN g/dL 13 3   HEMATOCRIT % 40 1   PLATELETS Thousands/uL 93*   NEUTROS PCT % 76*   LYMPHS PCT % 15   MONOS PCT % 7   EOS PCT % 2       Results from last 7 days  Lab Units 09/12/18  0828   SODIUM mmol/L 133*   POTASSIUM mmol/L 3 3*   CHLORIDE mmol/L 102   CO2 mmol/L 25   BUN mg/dL 7   CREATININE mg/dL 0 73   CALCIUM mg/dL 7 0*   ALK PHOS U/L 22*   ALT U/L 19   AST U/L 15       Results from last 7 days  Lab Units 09/11/18  0608   INR  1 02       Results from last 7 days  Lab Units 09/11/18  0059   LIPASE u/L 945*       Imaging Studies: I have personally reviewed pertinent imaging studies  Xr Chest Portable Icu    Result Date: 9/11/2018  Impression: Right-sided dual-lumen catheter with catheter tip projecting over the superior cavoatrial junction  No pneumothorax  Workstation performed: [id number]     Ct Abdomen Pelvis With Contrast    Result Date: 9/11/2018  Impression: Large amount of fat stranding is seen in the upper abdomen centered upon the pancreas and the gastric antrum  Statistically, this is most likely secondary to pancreatitis however the degree of fat stranding extends anteriorly more than is normally seen for pancreatitis suggesting this may have a gastric or duodenal component; upon review of the patient's prior exams, it is noted patient has had severe gastritis in the past   Recommend correlation with pancreas enzymes  Consider contrast examination in the appropriate clinical setting  No emphysematous gastritis  No obvious peritoneal collections (pseudocyst or abscess) on this noncontrast examination  No renal stones  No small bowel obstruction  Normal appendix  Workstation performed: [id number]         Assessment and Plan:     1) Acute on chronic pancreatitis secondary to hypertriglyceridemia -  Patient underwent plasmapheresis yesterday as his triglycerides were elevated at 1600  Post plasmapheresis, his triglycerides are now at 262  Patient has had numerous episodes of pancreatitis secondary to hypertriglyceridemia  He follows with pain management for celiac plexus block    -  Continue IV hydration with close monitoring of renal function and urine output  -  Continue Creon once  he is eating  -  Abstinence from alcohol and smoking cessation in order to prevent further episodes  -  Clear liquid diet as tolerated  -  Serial abdominal exams  -  He will be transferred back to the medicine floor  -  Close follow-up with Endocrinology to control  triglycerides       2) History of gastric thickening - As noted on previous CTs  He has fat stranding during this admission  He had an EGD this year that did not show gastritis    - Continue PPI BID  - Outpatient EUS

## 2018-09-13 LAB
ALBUMIN SERPL BCP-MCNC: 2.6 G/DL (ref 3.5–5)
ALP SERPL-CCNC: 32 U/L (ref 46–116)
ALT SERPL W P-5'-P-CCNC: 27 U/L (ref 12–78)
ANION GAP SERPL CALCULATED.3IONS-SCNC: 4 MMOL/L (ref 4–13)
AST SERPL W P-5'-P-CCNC: 32 U/L (ref 5–45)
BILIRUB SERPL-MCNC: 1.2 MG/DL (ref 0.2–1)
BUN SERPL-MCNC: 4 MG/DL (ref 5–25)
CALCIUM SERPL-MCNC: 7.8 MG/DL (ref 8.3–10.1)
CHLORIDE SERPL-SCNC: 105 MMOL/L (ref 100–108)
CO2 SERPL-SCNC: 29 MMOL/L (ref 21–32)
CREAT SERPL-MCNC: 0.7 MG/DL (ref 0.6–1.3)
ERYTHROCYTE [DISTWIDTH] IN BLOOD BY AUTOMATED COUNT: 13.4 % (ref 11.6–15.1)
GFR SERPL CREATININE-BSD FRML MDRD: 114 ML/MIN/1.73SQ M
GLUCOSE SERPL-MCNC: 99 MG/DL (ref 65–140)
HCT VFR BLD AUTO: 33.2 % (ref 36.5–49.3)
HGB BLD-MCNC: 10.9 G/DL (ref 12–17)
MCH RBC QN AUTO: 32.2 PG (ref 26.8–34.3)
MCHC RBC AUTO-ENTMCNC: 32.8 G/DL (ref 31.4–37.4)
MCV RBC AUTO: 98 FL (ref 82–98)
PLATELET # BLD AUTO: 75 THOUSANDS/UL (ref 149–390)
PMV BLD AUTO: 10.8 FL (ref 8.9–12.7)
POTASSIUM SERPL-SCNC: 3.4 MMOL/L (ref 3.5–5.3)
PROT SERPL-MCNC: 5.1 G/DL (ref 6.4–8.2)
RBC # BLD AUTO: 3.38 MILLION/UL (ref 3.88–5.62)
SODIUM SERPL-SCNC: 138 MMOL/L (ref 136–145)
WBC # BLD AUTO: 5.85 THOUSAND/UL (ref 4.31–10.16)

## 2018-09-13 PROCEDURE — 85027 COMPLETE CBC AUTOMATED: CPT | Performed by: NURSE PRACTITIONER

## 2018-09-13 PROCEDURE — 99233 SBSQ HOSP IP/OBS HIGH 50: CPT | Performed by: INTERNAL MEDICINE

## 2018-09-13 PROCEDURE — 80053 COMPREHEN METABOLIC PANEL: CPT | Performed by: NURSE PRACTITIONER

## 2018-09-13 RX ADMIN — TRAZODONE HYDROCHLORIDE 50 MG: 50 TABLET, FILM COATED ORAL at 21:27

## 2018-09-13 RX ADMIN — FENOFIBRATE 145 MG: 145 TABLET, FILM COATED ORAL at 08:28

## 2018-09-13 RX ADMIN — NICOTINE 1 PATCH: 21 PATCH TRANSDERMAL at 08:29

## 2018-09-13 RX ADMIN — PREGABALIN 75 MG: 75 CAPSULE ORAL at 08:28

## 2018-09-13 RX ADMIN — SODIUM CHLORIDE 200 ML/HR: 0.9 INJECTION, SOLUTION INTRAVENOUS at 17:15

## 2018-09-13 RX ADMIN — HYDROMORPHONE HYDROCHLORIDE 1 MG: 1 INJECTION, SOLUTION INTRAMUSCULAR; INTRAVENOUS; SUBCUTANEOUS at 21:51

## 2018-09-13 RX ADMIN — SODIUM CHLORIDE 200 ML/HR: 0.9 INJECTION, SOLUTION INTRAVENOUS at 05:54

## 2018-09-13 RX ADMIN — PANTOPRAZOLE SODIUM 40 MG: 40 TABLET, DELAYED RELEASE ORAL at 05:15

## 2018-09-13 RX ADMIN — PANTOPRAZOLE SODIUM 40 MG: 40 TABLET, DELAYED RELEASE ORAL at 17:14

## 2018-09-13 RX ADMIN — ATORVASTATIN CALCIUM 80 MG: 40 TABLET, FILM COATED ORAL at 17:14

## 2018-09-13 RX ADMIN — SODIUM CHLORIDE 200 ML/HR: 0.9 INJECTION, SOLUTION INTRAVENOUS at 12:30

## 2018-09-13 RX ADMIN — HYDROMORPHONE HYDROCHLORIDE 1 MG: 1 INJECTION, SOLUTION INTRAMUSCULAR; INTRAVENOUS; SUBCUTANEOUS at 13:57

## 2018-09-13 RX ADMIN — SODIUM CHLORIDE 200 ML/HR: 0.9 INJECTION, SOLUTION INTRAVENOUS at 00:46

## 2018-09-13 RX ADMIN — HYDROMORPHONE HYDROCHLORIDE 1 MG: 1 INJECTION, SOLUTION INTRAMUSCULAR; INTRAVENOUS; SUBCUTANEOUS at 05:54

## 2018-09-13 RX ADMIN — NIACIN 500 MG: 500 TABLET, FILM COATED, EXTENDED RELEASE ORAL at 08:28

## 2018-09-13 RX ADMIN — HYDROMORPHONE HYDROCHLORIDE 1 MG: 1 INJECTION, SOLUTION INTRAMUSCULAR; INTRAVENOUS; SUBCUTANEOUS at 02:01

## 2018-09-13 RX ADMIN — HYDROMORPHONE HYDROCHLORIDE 1 MG: 1 INJECTION, SOLUTION INTRAMUSCULAR; INTRAVENOUS; SUBCUTANEOUS at 09:52

## 2018-09-13 RX ADMIN — PANCRELIPASE 12000 UNITS: 30000; 6000; 19000 CAPSULE, DELAYED RELEASE PELLETS ORAL at 18:03

## 2018-09-13 RX ADMIN — PREGABALIN 75 MG: 75 CAPSULE ORAL at 21:27

## 2018-09-13 RX ADMIN — ENOXAPARIN SODIUM 40 MG: 40 INJECTION SUBCUTANEOUS at 08:28

## 2018-09-13 RX ADMIN — HYDROMORPHONE HYDROCHLORIDE 1 MG: 1 INJECTION, SOLUTION INTRAMUSCULAR; INTRAVENOUS; SUBCUTANEOUS at 18:00

## 2018-09-13 RX ADMIN — PREGABALIN 75 MG: 75 CAPSULE ORAL at 17:14

## 2018-09-13 RX ADMIN — PANCRELIPASE 12000 UNITS: 30000; 6000; 19000 CAPSULE, DELAYED RELEASE PELLETS ORAL at 08:29

## 2018-09-13 RX ADMIN — SODIUM CHLORIDE 200 ML/HR: 0.9 INJECTION, SOLUTION INTRAVENOUS at 21:51

## 2018-09-13 NOTE — PROGRESS NOTES
Progress Note - Sreekanth Galindo 37 y o  male MRN: [id number]    Unit/Bed#: -01 Encounter: [id number]        Subjective:     No events overnight  Patient reports that he did trial a clear liquid diet yesterday, he did have abdominal pain  Overall, his abdominal pain is improved  He is not vomiting  His not passed bowel movement yet  ROS: As noted in the HPI, otherwise all others negative  Objective:     Vitals: Blood pressure 130/72, pulse 91, temperature 98 4 °F (36 9 °C), temperature source Oral, resp  rate 18, height 5' 11" (1 803 m), weight 95 2 kg (209 lb 14 1 oz), SpO2 96 %  ,Body mass index is 29 27 kg/m²  Intake/Output Summary (Last 24 hours) at 09/13/18 1530  Last data filed at 09/13/18 0554   Gross per 24 hour   Intake          3946 67 ml   Output              700 ml   Net          3246 67 ml       Physical Exam:     General Appearance: Alert and oriented x 3   In no respiratory distress  Lungs: Clear to auscultation bilaterally, no rales or rhonchi  Heart: Regular rate and rhythm  Abdomen: Soft, moderately distended, upper abdominal tenderness without guarding much improved from yesterday's exam; bowel sounds normal; no masses or no organomegaly  Extremities: No cyanosis, edema    Invasive Devices     Central Venous Catheter Line            CVC Central Lines 09/11/18 Triple 16cm 1 day          Peripheral Intravenous Line            Peripheral IV 09/11/18 Left Hand 2 days                Lab Results:    Results from last 7 days  Lab Units 09/13/18  0531 09/12/18  0445   WBC Thousand/uL 5 85 8 13   HEMOGLOBIN g/dL 10 9* 13 3   HEMATOCRIT % 33 2* 40 1   PLATELETS Thousands/uL 75* 93*   NEUTROS PCT %  --  76*   LYMPHS PCT %  --  15   MONOS PCT %  --  7   EOS PCT %  --  2       Results from last 7 days  Lab Units 09/13/18  0531   SODIUM mmol/L 138   POTASSIUM mmol/L 3 4*   CHLORIDE mmol/L 105   CO2 mmol/L 29   BUN mg/dL 4*   CREATININE mg/dL 0 70   CALCIUM mg/dL 7 8*   ALK PHOS U/L 32*   ALT U/L 27   AST U/L 32       Results from last 7 days  Lab Units 09/11/18  0608   INR  1 02       Results from last 7 days  Lab Units 09/11/18  0059   LIPASE u/L 945*       Imaging Studies: I have personally reviewed pertinent imaging studies  Xr Chest Portable Icu    Result Date: 9/11/2018  Impression: Right-sided dual-lumen catheter with catheter tip projecting over the superior cavoatrial junction  No pneumothorax  Workstation performed: [id number]     Ct Abdomen Pelvis With Contrast    Result Date: 9/11/2018  Impression: Large amount of fat stranding is seen in the upper abdomen centered upon the pancreas and the gastric antrum  Statistically, this is most likely secondary to pancreatitis however the degree of fat stranding extends anteriorly more than is normally seen for pancreatitis suggesting this may have a gastric or duodenal component; upon review of the patient's prior exams, it is noted patient has had severe gastritis in the past   Recommend correlation with pancreas enzymes  Consider contrast examination in the appropriate clinical setting  No emphysematous gastritis  No obvious peritoneal collections (pseudocyst or abscess) on this noncontrast examination  No renal stones  No small bowel obstruction  Normal appendix  Workstation performed: [id number]         Assessment and Plan:     1) Acute on chronic pancreatitis secondary to hypertriglyceridemia - Patient is status post plasmapheresis yesterday  His triglycerides improved from 1600 to 262  Fortunately, he does not have a leukocytosis  Patient has had episodes of pancreatitis secondary to hypertriglyceridemia in the past   He follows with pain management for celiac plexus blocks    CT on admission showing significant fat stranding   - Continue Creon with meals  - Clear liquid diet as tolerated  - Consider repeat imaging if patient's abdominal pain worsens or patient develops fever to rule out developing necrosis or pseudocyst  - Gentle IV hydration with monitoring of renal function and urine output

## 2018-09-13 NOTE — PROGRESS NOTES
[facility] Progress Note  Patient: Anne Washington 37 y o  male   MRN: [id number]  PCP: LUANA Streeter  Unit/Bed#: -01 Encounter: [id number]  Date Of Visit: 18    Assessment/Plan:          · Acute on chronic pancreatitis secondary to hypertriglyceridemia  Patient received plasmapheresis  Feeling better  On clear liquid diet  GI following  Diet advance as per GI service  · Chronic pain secondary to chronic pancreatitis  Continue with pain management and current medications  · Hyponatremia-hemodynamic  Continue to monitor  · Tobacco abuse  Encouraged to quit  · GERD:  Continue with PPI  · Hypertriglyceridemia  Received plasmapheresis  Also continue with oral medications/tricor/niacin      VTE Pharmacologic Prophylaxis:   Pharmacologic: Enoxaparin (Lovenox)  Mechanical VTE Prophylaxis in Place: Yes    Patient Centered Rounds: I have performed bedside rounds with nursing staff today  Discussions with Specialists or Other Care Team Provider:  Yes    Education and Discussions with Family / Patient:  Yes    Time Spent for Care: 35+ minutes  More than 50% of total time spent on counseling and coordination of care as described above  Current Length of Stay: 2 day(s)    Current Patient Status: Inpatient   Certification Statement: The patient will continue to require additional inpatient hospital stay due to Pancreatitis    Discharge Plan:  Home when stable    Code Status: Level 1 - Full Code      Subjective:   Feels little better  Denies any chest pain  Denies any fever or chills  Objective:     Vitals:   Temp (24hrs), Av 1 °F (37 3 °C), Min:98 4 °F (36 9 °C), Max:100 °F (37 8 °C)    HR:  [86-99] 91  Resp:  [17-37] 18  BP: (111-130)/(60-79) 130/72  SpO2:  [92 %-98 %] 96 %  Body mass index is 29 27 kg/m²  Input and Output Summary (last 24 hours):        Intake/Output Summary (Last 24 hours) at 18 1202  Last data filed at 18 0554   Gross per 24 hour Intake          3946 67 ml   Output              700 ml   Net          3246 67 ml           Physical Exam:     Vital signs are reviewed as above  Constitutional:  Patient lying in bed  Not in any respiratory distress  Eyes: EOM grossly intact  Conjunctivae slightly pale  Patient has anicteric sclera  HENT: Oropharynx are slightly dry  Did not notice any significant lesions on the tongue  Head normocephalic  Neck:  Has right-sided access for plasmapheresis  Cardiac: I did not hear any rubs or gallop  Patient appears to be in sinus rhythm  Respiratory: Patient not in significant respiratory distress  Air entry in general is fair  GI: Abdomen is distended  He has tenderness in upper part of his abdomen  Bowel sounds are audible  Neurologic:  Patient is awake and alert  Neurological examination is grossly intact  No obvious focal neurological deficit noticed  Skin: Skin is warm and dry  Psychiatric:  Depressed  Musculoskeletal  Patient moving all extremities while lying in bed  Extremities: Patient has no significant cyanosis, clubbing, or lower extremity edema       Additional Data:     Labs:      Results from last 7 days  Lab Units 09/13/18  0531 09/12/18  0445   WBC Thousand/uL 5 85 8 13   HEMOGLOBIN g/dL 10 9* 13 3   HEMATOCRIT % 33 2* 40 1   PLATELETS Thousands/uL 75* 93*   NEUTROS PCT %  --  76*   LYMPHS PCT %  --  15   MONOS PCT %  --  7   EOS PCT %  --  2       Results from last 7 days  Lab Units 09/13/18  0531   SODIUM mmol/L 138   POTASSIUM mmol/L 3 4*   CHLORIDE mmol/L 105   CO2 mmol/L 29   BUN mg/dL 4*   CREATININE mg/dL 0 70   CALCIUM mg/dL 7 8*   ALK PHOS U/L 32*   ALT U/L 27   AST U/L 32       Results from last 7 days  Lab Units 09/11/18  0608   INR  1 02       * I Have Reviewed All Lab Data Listed Above  * Additional Pertinent Lab Tests Reviewed:  All Labs Within Last 24 Hours Reviewed              Last 24 Hours Medication List:     Current Facility-Administered Medications:  acetaminophen 650 mg Oral Q6H PRN Ezio Edwards PA-C    atorvastatin 80 mg Oral Daily With Izzy Money, RAY    docusate sodium 100 mg Oral BID PRN Ezio Edwards PA-C    enoxaparin 40 mg Subcutaneous Daily Ezio Edwards PA-C    fenofibrate 145 mg Oral Daily Ezio Edwards PA-C    HYDROmorphone 1 mg Intravenous Q4H PRN Dar Little MD    niacin 500 mg Oral Daily With Breakfast Ezio Edwards PA-C    nicotine 1 patch Transdermal Daily Ezio Edwards PA-C    ondansetron 4 mg Intravenous Q6H PRN Ezio Edwards PA-C    pancrelipase (Lip-Prot-Amyl) 12,000 Units Oral TID With Meals Ezio Edwards PA-C    pantoprazole 40 mg Oral BID Ezio Edwards PA-C    pregabalin 75 mg Oral TID Ezio Edwards PA-C    sodium chloride 200 mL/hr Intravenous Continuous Dar Little MD Last Rate: 200 mL/hr (09/13/18 0554)   traZODone 50 mg Oral HS Ezio Edwards PA-C      Facility-Administered Medications Ordered in Other Encounters:  oxyCODONE-acetaminophen 2 tablet Oral Once Arsh Roman MD        Today, Patient Was Seen By: Beatriz Pedro MD    ** Please Note: Dragon 360 Dictation voice to text software may have been used in the creation of this document   **

## 2018-09-13 NOTE — PLAN OF CARE
DISCHARGE PLANNING     Discharge to home or other facility with appropriate resources Progressing        DISCHARGE PLANNING - CARE MANAGEMENT     Discharge to post-acute care or home with appropriate resources Progressing        GASTROINTESTINAL - ADULT     Minimal or absence of nausea and/or vomiting Progressing        Knowledge Deficit     Patient/family/caregiver demonstrates understanding of disease process, treatment plan, medications, and discharge instructions Progressing        PAIN - ADULT     Verbalizes/displays adequate comfort level or baseline comfort level Progressing        Potential for Falls     Patient will remain free of falls Progressing

## 2018-09-14 LAB
ALBUMIN SERPL BCP-MCNC: 2.5 G/DL (ref 3.5–5)
ALP SERPL-CCNC: 50 U/L (ref 46–116)
ALT SERPL W P-5'-P-CCNC: 51 U/L (ref 12–78)
ANION GAP SERPL CALCULATED.3IONS-SCNC: 6 MMOL/L (ref 4–13)
AST SERPL W P-5'-P-CCNC: 58 U/L (ref 5–45)
BILIRUB SERPL-MCNC: 1.3 MG/DL (ref 0.2–1)
BUN SERPL-MCNC: 3 MG/DL (ref 5–25)
CALCIUM SERPL-MCNC: 8.1 MG/DL (ref 8.3–10.1)
CHLORIDE SERPL-SCNC: 105 MMOL/L (ref 100–108)
CO2 SERPL-SCNC: 28 MMOL/L (ref 21–32)
CREAT SERPL-MCNC: 0.68 MG/DL (ref 0.6–1.3)
ERYTHROCYTE [DISTWIDTH] IN BLOOD BY AUTOMATED COUNT: 13.4 % (ref 11.6–15.1)
GFR SERPL CREATININE-BSD FRML MDRD: 115 ML/MIN/1.73SQ M
GLUCOSE SERPL-MCNC: 92 MG/DL (ref 65–140)
HCT VFR BLD AUTO: 32.7 % (ref 36.5–49.3)
HGB BLD-MCNC: 10.7 G/DL (ref 12–17)
LIPASE SERPL-CCNC: 133 U/L (ref 73–393)
MCH RBC QN AUTO: 32.3 PG (ref 26.8–34.3)
MCHC RBC AUTO-ENTMCNC: 32.7 G/DL (ref 31.4–37.4)
MCV RBC AUTO: 99 FL (ref 82–98)
PLATELET # BLD AUTO: 88 THOUSANDS/UL (ref 149–390)
PMV BLD AUTO: 11 FL (ref 8.9–12.7)
POTASSIUM SERPL-SCNC: 3.2 MMOL/L (ref 3.5–5.3)
PROT SERPL-MCNC: 5.6 G/DL (ref 6.4–8.2)
RBC # BLD AUTO: 3.31 MILLION/UL (ref 3.88–5.62)
SODIUM SERPL-SCNC: 139 MMOL/L (ref 136–145)
TRIGL SERPL-MCNC: 125 MG/DL
WBC # BLD AUTO: 5.77 THOUSAND/UL (ref 4.31–10.16)

## 2018-09-14 PROCEDURE — 83690 ASSAY OF LIPASE: CPT | Performed by: INTERNAL MEDICINE

## 2018-09-14 PROCEDURE — 99233 SBSQ HOSP IP/OBS HIGH 50: CPT | Performed by: INTERNAL MEDICINE

## 2018-09-14 PROCEDURE — 80053 COMPREHEN METABOLIC PANEL: CPT | Performed by: INTERNAL MEDICINE

## 2018-09-14 PROCEDURE — 85027 COMPLETE CBC AUTOMATED: CPT | Performed by: INTERNAL MEDICINE

## 2018-09-14 PROCEDURE — 84478 ASSAY OF TRIGLYCERIDES: CPT | Performed by: INTERNAL MEDICINE

## 2018-09-14 RX ORDER — POTASSIUM CHLORIDE AND SODIUM CHLORIDE 900; 300 MG/100ML; MG/100ML
75 INJECTION, SOLUTION INTRAVENOUS CONTINUOUS
Status: DISCONTINUED | OUTPATIENT
Start: 2018-09-14 | End: 2018-09-17 | Stop reason: HOSPADM

## 2018-09-14 RX ADMIN — PANCRELIPASE 36000 UNITS: 24000; 76000; 120000 CAPSULE, DELAYED RELEASE PELLETS ORAL at 12:43

## 2018-09-14 RX ADMIN — TRAZODONE HYDROCHLORIDE 50 MG: 50 TABLET, FILM COATED ORAL at 22:24

## 2018-09-14 RX ADMIN — PREGABALIN 75 MG: 75 CAPSULE ORAL at 08:13

## 2018-09-14 RX ADMIN — FENOFIBRATE 145 MG: 145 TABLET, FILM COATED ORAL at 08:13

## 2018-09-14 RX ADMIN — SODIUM CHLORIDE 200 ML/HR: 0.9 INJECTION, SOLUTION INTRAVENOUS at 07:27

## 2018-09-14 RX ADMIN — PANTOPRAZOLE SODIUM 40 MG: 40 TABLET, DELAYED RELEASE ORAL at 15:12

## 2018-09-14 RX ADMIN — HYDROMORPHONE HYDROCHLORIDE 1 MG: 1 INJECTION, SOLUTION INTRAMUSCULAR; INTRAVENOUS; SUBCUTANEOUS at 06:29

## 2018-09-14 RX ADMIN — ENOXAPARIN SODIUM 40 MG: 40 INJECTION SUBCUTANEOUS at 08:13

## 2018-09-14 RX ADMIN — PREGABALIN 75 MG: 75 CAPSULE ORAL at 15:12

## 2018-09-14 RX ADMIN — POTASSIUM CHLORIDE AND SODIUM CHLORIDE 150 ML/HR: 900; 300 INJECTION, SOLUTION INTRAVENOUS at 23:00

## 2018-09-14 RX ADMIN — NICOTINE 1 PATCH: 21 PATCH TRANSDERMAL at 08:13

## 2018-09-14 RX ADMIN — HYDROMORPHONE HYDROCHLORIDE 1 MG: 1 INJECTION, SOLUTION INTRAMUSCULAR; INTRAVENOUS; SUBCUTANEOUS at 19:19

## 2018-09-14 RX ADMIN — HYDROMORPHONE HYDROCHLORIDE 1 MG: 1 INJECTION, SOLUTION INTRAMUSCULAR; INTRAVENOUS; SUBCUTANEOUS at 02:28

## 2018-09-14 RX ADMIN — HYDROMORPHONE HYDROCHLORIDE 1 MG: 1 INJECTION, SOLUTION INTRAMUSCULAR; INTRAVENOUS; SUBCUTANEOUS at 23:30

## 2018-09-14 RX ADMIN — NIACIN 500 MG: 500 TABLET, FILM COATED, EXTENDED RELEASE ORAL at 08:14

## 2018-09-14 RX ADMIN — ATORVASTATIN CALCIUM 80 MG: 40 TABLET, FILM COATED ORAL at 16:52

## 2018-09-14 RX ADMIN — HYDROMORPHONE HYDROCHLORIDE 1 MG: 1 INJECTION, SOLUTION INTRAMUSCULAR; INTRAVENOUS; SUBCUTANEOUS at 15:10

## 2018-09-14 RX ADMIN — PANCRELIPASE 36000 UNITS: 24000; 76000; 120000 CAPSULE, DELAYED RELEASE PELLETS ORAL at 18:16

## 2018-09-14 RX ADMIN — PANTOPRAZOLE SODIUM 40 MG: 40 TABLET, DELAYED RELEASE ORAL at 06:29

## 2018-09-14 RX ADMIN — PANCRELIPASE 12000 UNITS: 30000; 6000; 19000 CAPSULE, DELAYED RELEASE PELLETS ORAL at 08:15

## 2018-09-14 RX ADMIN — HYDROMORPHONE HYDROCHLORIDE 1 MG: 1 INJECTION, SOLUTION INTRAMUSCULAR; INTRAVENOUS; SUBCUTANEOUS at 10:51

## 2018-09-14 RX ADMIN — POTASSIUM CHLORIDE AND SODIUM CHLORIDE 150 ML/HR: 900; 300 INJECTION, SOLUTION INTRAVENOUS at 16:15

## 2018-09-14 RX ADMIN — POTASSIUM CHLORIDE AND SODIUM CHLORIDE 150 ML/HR: 900; 300 INJECTION, SOLUTION INTRAVENOUS at 09:26

## 2018-09-14 RX ADMIN — PREGABALIN 75 MG: 75 CAPSULE ORAL at 22:24

## 2018-09-14 NOTE — PROGRESS NOTES
Progress Note - [facility] 37 y o  male MRN: [id number]    Unit/Bed#: -01 Encounter: [id number]        Subjective:     No new complaints  Patient was able to tolerate chicken broth yesterday  However, he reports about 45 min afterwards he had abdominal pain  He passed a small bowel movement yesterday  ROS: As noted in the HPI, otherwise all others negative  Objective:     Vitals: Blood pressure 141/71, pulse 92, temperature 98 5 °F (36 9 °C), temperature source Oral, resp  rate 18, height 5' 11" (1 803 m), weight 95 2 kg (209 lb 14 1 oz), SpO2 96 %  ,Body mass index is 29 27 kg/m²  Intake/Output Summary (Last 24 hours) at 09/14/18 1118  Last data filed at 09/14/18 0926   Gross per 24 hour   Intake          6346 67 ml   Output                0 ml   Net          6346 67 ml       Physical Exam:     General Appearance: Alert and oriented x 3  In no respiratory distress  Lungs: Clear to auscultation bilaterally, no rales or rhonchi  Heart: Regular rate and rhythm  Abdomen: Soft, upper abdominal tenderness without guarding or rigidity, non-distended; bowel sounds normal; no masses or no organomegaly  Extremities: No cyanosis, edema    Invasive Devices     Central Venous Catheter Line            CVC Central Lines 09/11/18 Triple 16cm 2 days          Peripheral Intravenous Line            Peripheral IV 09/11/18 Left Hand 3 days                Lab Results:    Results from last 7 days  Lab Units 09/14/18  0635  09/12/18  0445   WBC Thousand/uL 5 77  < > 8 13   HEMOGLOBIN g/dL 10 7*  < > 13 3   HEMATOCRIT % 32 7*  < > 40 1   PLATELETS Thousands/uL 88*  < > 93*   NEUTROS PCT %  --   --  76*   LYMPHS PCT %  --   --  15   MONOS PCT %  --   --  7   EOS PCT %  --   --  2   < > = values in this interval not displayed      Results from last 7 days  Lab Units 09/14/18  0635   SODIUM mmol/L 139   POTASSIUM mmol/L 3 2*   CHLORIDE mmol/L 105   CO2 mmol/L 28   BUN mg/dL 3*   CREATININE mg/dL 0 68   CALCIUM mg/dL 8 1*   ALK PHOS U/L 50   ALT U/L 51   AST U/L 58*       Results from last 7 days  Lab Units 09/11/18  0608   INR  1 02       Results from last 7 days  Lab Units 09/14/18  0635   LIPASE u/L 133       Imaging Studies: I have personally reviewed pertinent imaging studies  Xr Chest Portable Icu    Result Date: 9/11/2018  Impression: Right-sided dual-lumen catheter with catheter tip projecting over the superior cavoatrial junction  No pneumothorax  Workstation performed: [id number]     Ct Abdomen Pelvis With Contrast    Result Date: 9/11/2018  Impression: Large amount of fat stranding is seen in the upper abdomen centered upon the pancreas and the gastric antrum  Statistically, this is most likely secondary to pancreatitis however the degree of fat stranding extends anteriorly more than is normally seen for pancreatitis suggesting this may have a gastric or duodenal component; upon review of the patient's prior exams, it is noted patient has had severe gastritis in the past   Recommend correlation with pancreas enzymes  Consider contrast examination in the appropriate clinical setting  No emphysematous gastritis  No obvious peritoneal collections (pseudocyst or abscess) on this noncontrast examination  No renal stones  No small bowel obstruction  Normal appendix  Workstation performed: [id number]         Assessment and Plan:     1) Acute on chronic pancreatitis secondary to hypertriglyceridemia - Patient had successful plasmapheresis  His triglycerides went from 1600 2-62  They are 125 today  Patient had significant fat stranding on CT  He follows with pain manage her celiac plexus blocks  It appears that his bowels starting to move, which is reassuring  He has no leukocytosis or fevers    - Continue Creon with meals  - Clear liquid diet as tolerated, may be able to advance tomorrow to low-fat diet  - Will hold off on repeat imaging for now as his abdominal pain appears to be stable, he does not have fevers or leukocytosis    He does have history of pseudocyst in the past   - Would decrease IV hydration at this point with continued monitoring of renal function and urine output  - Outpatient management of his triglycerides

## 2018-09-14 NOTE — PROGRESS NOTES
[facility] Progress Note  Patient: Ashli Guadarrama 37 y o  male   MRN: [id number]  PCP: LUANA Orona  Unit/Bed#: [facility] [place]01 Encounter: [id number]  Date Of Visit: 18    Assessment/Plan:          · Acute on recurrent/chronic pancreatitis associated with hypertriglyceridemia  Patient status post plasmapheresis  Making very slow progress  His abdomen is still quite distended  Likely has peritonitis secondary to pancreatitis  GI following  · Tobacco abuse  Encouraged to quit  · GERD  Continue with PPI  · Hypokalemia  Add potassium in IV fluids  · Mild anemia-likely secondary to hydration  · Thrombocytopenia  Platelets slightly better  Continue to monitor  · Leukocytosis-likely secondary to stress/reactive  Improved      VTE Pharmacologic Prophylaxis:   Pharmacologic: Enoxaparin (Lovenox)  Mechanical VTE Prophylaxis in Place: Yes    Patient Centered Rounds: I have performed bedside rounds with nursing staff today  Discussions with Specialists or Other Care Team Provider:  Yes    Education and Discussions with Family / Patient:  Yes      Current Length of Stay: 3 day(s)    Current Patient Status: Inpatient   Certification Statement: The patient will continue to require additional inpatient hospital stay due to IV fluids/pancreatitis    Discharge Plan:  Home when stable    Code Status: Level 1 - Full Code      Subjective:   Patient feels okay  Moving his bowels  Passing flatus  Still lot of abdominal pain  No significant nausea or vomiting  No fever or chills    Objective:     Vitals:   Temp (24hrs), Av 7 °F (37 1 °C), Min:98 4 °F (36 9 °C), Max:99 1 °F (37 3 °C)    HR:  [90-93] 92  Resp:  [18] 18  BP: (130-141)/(71-74) 141/71  SpO2:  [95 %-96 %] 96 %  Body mass index is 29 27 kg/m²  Input and Output Summary (last 24 hours):        Intake/Output Summary (Last 24 hours) at 18 1001  Last data filed at 18 0926   Gross per 24 hour   Intake          6346 67 ml Output                0 ml   Net          6346 67 ml           Physical Exam:     Patient up in the room  Just came out of the bathroom  Vital signs reviewed as above  Not in any respiratory distress  Abdomen is distended and tender  Bowel sounds are audible  Decreased breath sounds at the bases  Oropharynx are hydrated  Conjunctiva slightly pale  Awake and alert  Oriented x3  IJ right-sided access for plasmapheresis-advised staff to ask GI service if this could be removed    Additional Data:     Labs:      Results from last 7 days  Lab Units 09/14/18  0635  09/12/18  0445   WBC Thousand/uL 5 77  < > 8 13   HEMOGLOBIN g/dL 10 7*  < > 13 3   HEMATOCRIT % 32 7*  < > 40 1   PLATELETS Thousands/uL 88*  < > 93*   NEUTROS PCT %  --   --  76*   LYMPHS PCT %  --   --  15   MONOS PCT %  --   --  7   EOS PCT %  --   --  2   < > = values in this interval not displayed  Results from last 7 days  Lab Units 09/14/18  0635   SODIUM mmol/L 139   POTASSIUM mmol/L 3 2*   CHLORIDE mmol/L 105   CO2 mmol/L 28   BUN mg/dL 3*   CREATININE mg/dL 0 68   CALCIUM mg/dL 8 1*   ALK PHOS U/L 50   ALT U/L 51   AST U/L 58*       Results from last 7 days  Lab Units 09/11/18  0608   INR  1 02       * I Have Reviewed All Lab Data Listed Above  * Additional Pertinent Lab Tests Reviewed:  All Labs Within Last 24 Hours Reviewed      Recent Cultures (last 7 days):           Last 24 Hours Medication List:     Current Facility-Administered Medications:  acetaminophen 650 mg Oral Q6H PRN Ezio Edwards PA-C    atorvastatin 80 mg Oral Daily With [employer] RAY Edwards    docusate sodium 100 mg Oral BID PRN Ezio Edwards PA-C    enoxaparin 40 mg Subcutaneous Daily Ezio Edwards PA-C    fenofibrate 145 mg Oral Daily Ezio Edwards PA-C    HYDROmorphone 1 mg Intravenous Q4H PRN Rosa Nuno MD    niacin 500 mg Oral Daily With Breakfast Ezio Edwards PA-C    nicotine 1 patch Transdermal Daily Ezio Edwards PA-C    ondansetron 4 mg Intravenous Q6H PRN Ezio Edwards RAY    pancrelipase (Lip-Prot-Amyl) 36,000 Units Oral TID With Meals Cherelle Larose PA-C    pantoprazole 40 mg Oral BID Ezio Edwards PA-C    pregabalin 75 mg Oral TID Ezio Edwards PA-C    sodium chloride 0 9 % with KCl 40 mEq/L 150 mL/hr Intravenous Continuous Betty Beckman MD Last Rate: 150 mL/hr (09/14/18 0926)   traZODone 50 mg Oral HS Ezio Edwards PA-C      Facility-Administered Medications Ordered in Other Encounters:  oxyCODONE-acetaminophen 2 tablet Oral Once Barrett Briceño MD        Today, Patient Was Seen By: Betty Beckman MD    ** Please Note: Dragon 360 Dictation voice to text software may have been used in the creation of this document   **

## 2018-09-15 PROCEDURE — 99232 SBSQ HOSP IP/OBS MODERATE 35: CPT | Performed by: INTERNAL MEDICINE

## 2018-09-15 RX ORDER — POTASSIUM CHLORIDE 20 MEQ/1
40 TABLET, EXTENDED RELEASE ORAL
Status: COMPLETED | OUTPATIENT
Start: 2018-09-15 | End: 2018-09-17

## 2018-09-15 RX ADMIN — PANCRELIPASE 36000 UNITS: 24000; 76000; 120000 CAPSULE, DELAYED RELEASE PELLETS ORAL at 08:10

## 2018-09-15 RX ADMIN — ENOXAPARIN SODIUM 40 MG: 40 INJECTION SUBCUTANEOUS at 08:09

## 2018-09-15 RX ADMIN — PREGABALIN 75 MG: 75 CAPSULE ORAL at 16:28

## 2018-09-15 RX ADMIN — POTASSIUM CHLORIDE 40 MEQ: 1500 TABLET, EXTENDED RELEASE ORAL at 10:30

## 2018-09-15 RX ADMIN — HYDROMORPHONE HYDROCHLORIDE 1 MG: 1 INJECTION, SOLUTION INTRAMUSCULAR; INTRAVENOUS; SUBCUTANEOUS at 08:06

## 2018-09-15 RX ADMIN — TRAZODONE HYDROCHLORIDE 50 MG: 50 TABLET, FILM COATED ORAL at 23:26

## 2018-09-15 RX ADMIN — POTASSIUM CHLORIDE AND SODIUM CHLORIDE 150 ML/HR: 900; 300 INJECTION, SOLUTION INTRAVENOUS at 05:24

## 2018-09-15 RX ADMIN — HYDROMORPHONE HYDROCHLORIDE 1 MG: 1 INJECTION, SOLUTION INTRAMUSCULAR; INTRAVENOUS; SUBCUTANEOUS at 20:43

## 2018-09-15 RX ADMIN — PREGABALIN 75 MG: 75 CAPSULE ORAL at 08:10

## 2018-09-15 RX ADMIN — HYDROMORPHONE HYDROCHLORIDE 1 MG: 1 INJECTION, SOLUTION INTRAMUSCULAR; INTRAVENOUS; SUBCUTANEOUS at 12:18

## 2018-09-15 RX ADMIN — NIACIN 500 MG: 500 TABLET, FILM COATED, EXTENDED RELEASE ORAL at 08:10

## 2018-09-15 RX ADMIN — POTASSIUM CHLORIDE AND SODIUM CHLORIDE 150 ML/HR: 900; 300 INJECTION, SOLUTION INTRAVENOUS at 19:36

## 2018-09-15 RX ADMIN — POTASSIUM CHLORIDE AND SODIUM CHLORIDE 150 ML/HR: 900; 300 INJECTION, SOLUTION INTRAVENOUS at 12:23

## 2018-09-15 RX ADMIN — PANCRELIPASE 36000 UNITS: 24000; 76000; 120000 CAPSULE, DELAYED RELEASE PELLETS ORAL at 13:00

## 2018-09-15 RX ADMIN — PANCRELIPASE 36000 UNITS: 24000; 76000; 120000 CAPSULE, DELAYED RELEASE PELLETS ORAL at 16:29

## 2018-09-15 RX ADMIN — HYDROMORPHONE HYDROCHLORIDE 1 MG: 1 INJECTION, SOLUTION INTRAMUSCULAR; INTRAVENOUS; SUBCUTANEOUS at 03:45

## 2018-09-15 RX ADMIN — ONDANSETRON 4 MG: 2 INJECTION INTRAMUSCULAR; INTRAVENOUS at 21:57

## 2018-09-15 RX ADMIN — PANTOPRAZOLE SODIUM 40 MG: 40 TABLET, DELAYED RELEASE ORAL at 05:24

## 2018-09-15 RX ADMIN — ATORVASTATIN CALCIUM 80 MG: 40 TABLET, FILM COATED ORAL at 16:28

## 2018-09-15 RX ADMIN — NICOTINE 1 PATCH: 21 PATCH TRANSDERMAL at 08:09

## 2018-09-15 RX ADMIN — PANTOPRAZOLE SODIUM 40 MG: 40 TABLET, DELAYED RELEASE ORAL at 16:28

## 2018-09-15 RX ADMIN — POTASSIUM CHLORIDE 40 MEQ: 1500 TABLET, EXTENDED RELEASE ORAL at 16:28

## 2018-09-15 RX ADMIN — HYDROMORPHONE HYDROCHLORIDE 1 MG: 1 INJECTION, SOLUTION INTRAMUSCULAR; INTRAVENOUS; SUBCUTANEOUS at 16:26

## 2018-09-15 RX ADMIN — PREGABALIN 75 MG: 75 CAPSULE ORAL at 20:43

## 2018-09-15 RX ADMIN — FENOFIBRATE 145 MG: 145 TABLET, FILM COATED ORAL at 08:10

## 2018-09-15 NOTE — PROGRESS NOTES
[facility] Progress Note  Patient: Richelle George 37 y o  male   MRN: [id number]  PCP: LUANA Meeks  Unit/Bed#: -[place] Encounter: [id number]  Date Of Visit: 09/15/18    Assessment/Plan:          · Acute on chronic recurrent pancreatitis secondary to hypertriglyceridemia  He also consumes some alcohol here and there  Encouraged him to quit drinking altogether  Clinically he is slightly better  Would discontinue his central line which was put in for plasmapheresis  · Hypertriglyceridemia  Status post plasmapheresis  Triglycerides have come down nicely  · Tobacco abuse  Encouraged to quit  · Leukocytosis-resolved  Likely reactive  · Hypokalemia  Would also give him few doses of oral potassium  Also added potassium in his IV fluids  · Thrombocytopenia  ?  Mild DIC secondary to acute pancreatitis  Continue to monitor  · GERD:  Continue with PPI      VTE Pharmacologic Prophylaxis:   Pharmacologic: Enoxaparin (Lovenox)  Mechanical VTE Prophylaxis in Place: Yes    Patient Centered Rounds: I have performed bedside rounds with nursing staff today  Discussions with Specialists or Other Care Team Provider:  Yes    Education and Discussions with Family / Patient:  Yes      Current Length of Stay: 4 day(s)    Current Patient Status: Inpatient   Certification Statement: The patient will continue to require additional inpatient hospital stay due to Pancreatitis    Discharge Plan:  Home when stable    Code Status: Level 1 - Full Code      Subjective:   Feels little better today  He is moving his bowels  No nausea or vomiting  Continues to have significant abdominal pain  No fever or    Objective:     Vitals:   Temp (24hrs), Av 4 °F (37 4 °C), Min:98 9 °F (37 2 °C), Max:99 8 °F (37 7 °C)    HR:  [] 84  Resp:  [18-19] 18  BP: (133-141)/(71-77) 138/71  SpO2:  [95 %-96 %] 96 %  Body mass index is 29 27 kg/m²  Input and Output Summary (last 24 hours):        Intake/Output Summary (Last 24 hours) at 09/15/18 0926  Last data filed at 09/15/18 0524   Gross per 24 hour   Intake           2194 5 ml   Output                0 ml   Net           2194 5 ml           Physical Exam:     Vital signs are reviewed as above  Patient lying in bed  Not in any respiratory distress  Abdomen is slightly softer today  Bowel sounds are audible  Awake and alert  Oriented x3  Mood and affect are pleasant  Decreased breath sounds at the bases  S1 and S2 audible  Skin is warm and dry  Oropharynx hydrated      Additional Data:     Labs:      Results from last 7 days  Lab Units 09/14/18  0635  09/12/18  0445   WBC Thousand/uL 5 77  < > 8 13   HEMOGLOBIN g/dL 10 7*  < > 13 3   HEMATOCRIT % 32 7*  < > 40 1   PLATELETS Thousands/uL 88*  < > 93*   NEUTROS PCT %  --   --  76*   LYMPHS PCT %  --   --  15   MONOS PCT %  --   --  7   EOS PCT %  --   --  2   < > = values in this interval not displayed  Results from last 7 days  Lab Units 09/14/18  0635   SODIUM mmol/L 139   POTASSIUM mmol/L 3 2*   CHLORIDE mmol/L 105   CO2 mmol/L 28   BUN mg/dL 3*   CREATININE mg/dL 0 68   CALCIUM mg/dL 8 1*   ALK PHOS U/L 50   ALT U/L 51   AST U/L 58*       Results from last 7 days  Lab Units 09/11/18  0608   INR  1 02       * I Have Reviewed All Lab Data Listed Above  * Additional Pertinent Lab Tests Reviewed:  All Labs Within Last 24 Hours Reviewed      Recent Cultures (last 7 days):           Last 24 Hours Medication List:     Current Facility-Administered Medications:  acetaminophen 650 mg Oral Q6H PRN Ezio Edwards PA-C    atorvastatin 80 mg Oral Daily With [employer] RAY Edwards    docusate sodium 100 mg Oral BID PRN Ezio Edwards PA-C    enoxaparin 40 mg Subcutaneous Daily Ezio Edwards PA-C    fenofibrate 145 mg Oral Daily Ezio Edwards PA-C    HYDROmorphone 1 mg Intravenous Q4H PRN Payton Machuca MD    niacin 500 mg Oral Daily With Breakfast Ezio Edwards PA-C    nicotine 1 patch Transdermal Daily Ezio Edwards PA-C    ondansetron 4 mg Intravenous Q6H PRN Ezio Edwards PA-C    pancrelipase (Lip-Prot-Amyl) 36,000 Units Oral TID With Meals Beny Benjamin PA-C    pantoprazole 40 mg Oral BID Ezio Edwards PA-C    potassium chloride 40 mEq Oral TID With Meals Marie Qureshi MD    pregabalin 75 mg Oral TID Ezio Edwards PA-C    sodium chloride 0 9 % with KCl 40 mEq/L 150 mL/hr Intravenous Continuous Marie Qureshi MD Last Rate: 150 mL/hr (09/15/18 0524)   traZODone 50 mg Oral HS Ezio Edwards PA-C      Facility-Administered Medications Ordered in Other Encounters:  oxyCODONE-acetaminophen 2 tablet Oral Once Ismael Brown MD        Today, Patient Was Seen By: Marie Qureshi MD    ** Please Note: Dragon 360 Dictation voice to text software may have been used in the creation of this document   **

## 2018-09-16 PROBLEM — R74.8 ABNORMAL TRANSAMINASES: Status: ACTIVE | Noted: 2018-04-18

## 2018-09-16 PROBLEM — D72.829 LEUKOCYTOSIS: Status: RESOLVED | Noted: 2017-11-16 | Resolved: 2018-09-16

## 2018-09-16 LAB
ALBUMIN SERPL BCP-MCNC: 2.7 G/DL (ref 3.5–5)
ALP SERPL-CCNC: 75 U/L (ref 46–116)
ALT SERPL W P-5'-P-CCNC: 98 U/L (ref 12–78)
ANION GAP SERPL CALCULATED.3IONS-SCNC: 7 MMOL/L (ref 4–13)
AST SERPL W P-5'-P-CCNC: 87 U/L (ref 5–45)
BILIRUB SERPL-MCNC: 0.7 MG/DL (ref 0.2–1)
BUN SERPL-MCNC: 4 MG/DL (ref 5–25)
CALCIUM SERPL-MCNC: 9.3 MG/DL (ref 8.3–10.1)
CHLORIDE SERPL-SCNC: 102 MMOL/L (ref 100–108)
CO2 SERPL-SCNC: 29 MMOL/L (ref 21–32)
CREAT SERPL-MCNC: 0.69 MG/DL (ref 0.6–1.3)
GFR SERPL CREATININE-BSD FRML MDRD: 115 ML/MIN/1.73SQ M
GLUCOSE SERPL-MCNC: 112 MG/DL (ref 65–140)
POTASSIUM SERPL-SCNC: 4.3 MMOL/L (ref 3.5–5.3)
PROT SERPL-MCNC: 6.7 G/DL (ref 6.4–8.2)
SODIUM SERPL-SCNC: 138 MMOL/L (ref 136–145)

## 2018-09-16 PROCEDURE — 99233 SBSQ HOSP IP/OBS HIGH 50: CPT | Performed by: INTERNAL MEDICINE

## 2018-09-16 PROCEDURE — 80053 COMPREHEN METABOLIC PANEL: CPT | Performed by: INTERNAL MEDICINE

## 2018-09-16 RX ADMIN — PANTOPRAZOLE SODIUM 40 MG: 40 TABLET, DELAYED RELEASE ORAL at 16:48

## 2018-09-16 RX ADMIN — NICOTINE 1 PATCH: 21 PATCH TRANSDERMAL at 09:33

## 2018-09-16 RX ADMIN — ENOXAPARIN SODIUM 40 MG: 40 INJECTION SUBCUTANEOUS at 09:34

## 2018-09-16 RX ADMIN — PREGABALIN 75 MG: 75 CAPSULE ORAL at 09:35

## 2018-09-16 RX ADMIN — POTASSIUM CHLORIDE AND SODIUM CHLORIDE 150 ML/HR: 900; 300 INJECTION, SOLUTION INTRAVENOUS at 07:39

## 2018-09-16 RX ADMIN — HYDROMORPHONE HYDROCHLORIDE 1 MG: 1 INJECTION, SOLUTION INTRAMUSCULAR; INTRAVENOUS; SUBCUTANEOUS at 13:46

## 2018-09-16 RX ADMIN — POTASSIUM CHLORIDE 40 MEQ: 1500 TABLET, EXTENDED RELEASE ORAL at 16:48

## 2018-09-16 RX ADMIN — HYDROMORPHONE HYDROCHLORIDE 1 MG: 1 INJECTION, SOLUTION INTRAMUSCULAR; INTRAVENOUS; SUBCUTANEOUS at 18:49

## 2018-09-16 RX ADMIN — ATORVASTATIN CALCIUM 80 MG: 40 TABLET, FILM COATED ORAL at 16:47

## 2018-09-16 RX ADMIN — PANTOPRAZOLE SODIUM 40 MG: 40 TABLET, DELAYED RELEASE ORAL at 05:27

## 2018-09-16 RX ADMIN — PREGABALIN 75 MG: 75 CAPSULE ORAL at 21:08

## 2018-09-16 RX ADMIN — PREGABALIN 75 MG: 75 CAPSULE ORAL at 16:48

## 2018-09-16 RX ADMIN — POTASSIUM CHLORIDE 40 MEQ: 1500 TABLET, EXTENDED RELEASE ORAL at 08:00

## 2018-09-16 RX ADMIN — PANCRELIPASE 36000 UNITS: 24000; 76000; 120000 CAPSULE, DELAYED RELEASE PELLETS ORAL at 13:00

## 2018-09-16 RX ADMIN — NIACIN 500 MG: 500 TABLET, FILM COATED, EXTENDED RELEASE ORAL at 08:00

## 2018-09-16 RX ADMIN — ONDANSETRON 4 MG: 2 INJECTION INTRAMUSCULAR; INTRAVENOUS at 15:32

## 2018-09-16 RX ADMIN — HYDROMORPHONE HYDROCHLORIDE 1 MG: 1 INJECTION, SOLUTION INTRAMUSCULAR; INTRAVENOUS; SUBCUTANEOUS at 09:30

## 2018-09-16 RX ADMIN — FENOFIBRATE 145 MG: 145 TABLET, FILM COATED ORAL at 09:33

## 2018-09-16 RX ADMIN — POTASSIUM CHLORIDE AND SODIUM CHLORIDE 150 ML/HR: 900; 300 INJECTION, SOLUTION INTRAVENOUS at 01:07

## 2018-09-16 RX ADMIN — TRAZODONE HYDROCHLORIDE 50 MG: 50 TABLET, FILM COATED ORAL at 21:08

## 2018-09-16 RX ADMIN — HYDROMORPHONE HYDROCHLORIDE 1 MG: 1 INJECTION, SOLUTION INTRAMUSCULAR; INTRAVENOUS; SUBCUTANEOUS at 23:05

## 2018-09-16 RX ADMIN — POTASSIUM CHLORIDE 40 MEQ: 1500 TABLET, EXTENDED RELEASE ORAL at 13:07

## 2018-09-16 RX ADMIN — PANCRELIPASE 36000 UNITS: 24000; 76000; 120000 CAPSULE, DELAYED RELEASE PELLETS ORAL at 09:34

## 2018-09-16 RX ADMIN — HYDROMORPHONE HYDROCHLORIDE 1 MG: 1 INJECTION, SOLUTION INTRAMUSCULAR; INTRAVENOUS; SUBCUTANEOUS at 01:01

## 2018-09-16 RX ADMIN — HYDROMORPHONE HYDROCHLORIDE 1 MG: 1 INJECTION, SOLUTION INTRAMUSCULAR; INTRAVENOUS; SUBCUTANEOUS at 05:27

## 2018-09-16 RX ADMIN — POTASSIUM CHLORIDE AND SODIUM CHLORIDE 75 ML/HR: 900; 300 INJECTION, SOLUTION INTRAVENOUS at 16:25

## 2018-09-16 RX ADMIN — PANCRELIPASE 36000 UNITS: 24000; 76000; 120000 CAPSULE, DELAYED RELEASE PELLETS ORAL at 16:48

## 2018-09-16 NOTE — PROGRESS NOTES
[facility] Progress Note  Patient: Shahana Mccormack 37 y o  male   MRN: [id number]  PCP: LUANA Hector  Unit/Bed#: MS 316Elizabeth Encounter: [id number]  Date Of Visit: 18    Assessment/Plan:          · Acute on chronic recurrent pancreatitis secondary to hypertriglyceridemia  Clinically he is better although his transaminases are high  He had right IJ for plasmapheresis which was removed yesterday  GI service has signed off  Advanced his diet  Follow-up labs in the morning  Making slow progress  Cut back on IV fluids  · Hypertriglyceridemia  Low-fat diet  Continue with other treatment  · Leukocytosis-resolved  · Chronic pain  Continue with pain control  · Tobacco abuse  Encouraged to quit  · Abnormal transaminases  Plan as above  · Hyponatremia  Hemodynamic  Continue to monitor  · Hypokalemia  Repleted and follow-up as needed  · GERD:  Continue with PPI  · Thrombocytopenia  Continue to monitor      VTE Pharmacologic Prophylaxis:   Pharmacologic: Enoxaparin (Lovenox)  Mechanical VTE Prophylaxis in Place: Yes    Patient Centered Rounds: I have performed bedside rounds with nursing staff today  Discussions with Specialists or Other Care Team Provider:  Yes    Education and Discussions with Family / Patient:  Yes        Current Length of Stay: 5 day(s)    Current Patient Status: Inpatient   Certification Statement: The patient will continue to require additional inpatient hospital stay due to Acute pancreatitis-recurrent    Discharge Plan:  Home when stable    Code Status: Level 1 - Full Code      Subjective:   Feels okay  Not worsening of the pain  Denies any fever or chills  Denies any nausea, vomiting, diarrhea  Has not moved his bowels today  Objective:     Vitals:   Temp (24hrs), Av 1 °F (37 3 °C), Min:99 1 °F (37 3 °C), Max:99 1 °F (37 3 °C)    HR:  [89] 89  Resp:  [18] 18  BP: (128)/(78) 128/78  SpO2:  [96 %] 96 %  Body mass index is 29 4 kg/m²         Input and Output Summary (last 24 hours): Intake/Output Summary (Last 24 hours) at 09/16/18 1127  Last data filed at 09/16/18 0739   Gross per 24 hour   Intake             2478 ml   Output              200 ml   Net             2278 ml           Physical Exam:     Vital signs are reviewed as above  In bed and not in any respiratory distress  Abdomen is soft  It is not worse  Bowel sounds are audible  Oropharynx are better hydrated  Conjunctiva slightly pale  Awake and alert  Oriented x3  Decreased breath sounds at the bases  No cyanosis, clubbing, or lower extremities edema  Depressed  S1 and S2 audible      Additional Data:     Labs:      Results from last 7 days  Lab Units 09/14/18  0635  09/12/18  0445   WBC Thousand/uL 5 77  < > 8 13   HEMOGLOBIN g/dL 10 7*  < > 13 3   HEMATOCRIT % 32 7*  < > 40 1   PLATELETS Thousands/uL 88*  < > 93*   NEUTROS PCT %  --   --  76*   LYMPHS PCT %  --   --  15   MONOS PCT %  --   --  7   EOS PCT %  --   --  2   < > = values in this interval not displayed  Results from last 7 days  Lab Units 09/16/18  0604   SODIUM mmol/L 138   POTASSIUM mmol/L 4 3   CHLORIDE mmol/L 102   CO2 mmol/L 29   BUN mg/dL 4*   CREATININE mg/dL 0 69   CALCIUM mg/dL 9 3   ALK PHOS U/L 75   ALT U/L 98*   AST U/L 87*       Results from last 7 days  Lab Units 09/11/18  0608   INR  1 02       * I Have Reviewed All Lab Data Listed Above    * Additional Pertinent Lab Tests Reviewed:  Labs reviewed as above    Delete    Recent Cultures (last 7 days):           Last 24 Hours Medication List:     Current Facility-Administered Medications:  acetaminophen 650 mg Oral Q6H PRN Ezio Edwards PA-C    atorvastatin 80 mg Oral Daily With [employer] RAY Edwards    docusate sodium 100 mg Oral BID PRN Ezio Edwards PA-C    enoxaparin 40 mg Subcutaneous Daily Ezio Edwards PA-C    fenofibrate 145 mg Oral Daily Ezio Edwards PA-C    HYDROmorphone 1 mg Intravenous Q4H PRN Vani Hargrove MD    niacin 500 mg Oral Daily With Breakfast Ezio Edwards PA-C    nicotine 1 patch Transdermal Daily Ezio Edwards PA-C    ondansetron 4 mg Intravenous Q6H PRN Ezio Edwards PA-C    pancrelipase (Lip-Prot-Amyl) 36,000 Units Oral TID With Meals Collette Kudo, PA-C    pantoprazole 40 mg Oral BID Ezio Edwards PA-C    potassium chloride 40 mEq Oral TID With Meals Otis Nassar MD    pregabalin 75 mg Oral TID Ezio Edwards PA-C    sodium chloride 0 9 % with KCl 40 mEq/L 75 mL/hr Intravenous Continuous Otis Nassar MD Last Rate: 150 mL/hr (09/16/18 0739)   traZODone 50 mg Oral HS Ezio Edwards PA-C      Facility-Administered Medications Ordered in Other Encounters:  oxyCODONE-acetaminophen 2 tablet Oral Once Joby Rodriguez MD        Today, Patient Was Seen By: Otis Nassar MD    ** Please Note: Dragon 360 Dictation voice to text software may have been used in the creation of this document   **

## 2018-09-16 NOTE — TREATMENT PLAN
Patient seen again later today  Patient not really feeling well  Nauseous  No bowel movement yet  Had some food today more than usual   He appears comfortable  Discussed with patient and family  Advised to divide his meals into smaller meals and more frequent instead of the meals 3 times a day  Ordered labs for tomorrow  May have to reach consult GI service symptoms continued to get worse  May need repeat CT scan/ultrasound of his pancreas

## 2018-09-17 ENCOUNTER — TRANSITIONAL CARE MANAGEMENT (OUTPATIENT)
Dept: FAMILY MEDICINE CLINIC | Facility: CLINIC | Age: 43
End: 2018-09-17

## 2018-09-17 VITALS
TEMPERATURE: 97.8 F | HEART RATE: 86 BPM | RESPIRATION RATE: 18 BRPM | BODY MASS INDEX: 29.51 KG/M2 | OXYGEN SATURATION: 95 % | DIASTOLIC BLOOD PRESSURE: 77 MMHG | SYSTOLIC BLOOD PRESSURE: 124 MMHG | HEIGHT: 71 IN | WEIGHT: 210.76 LBS

## 2018-09-17 LAB
ALBUMIN SERPL BCP-MCNC: 2.9 G/DL (ref 3.5–5)
ALP SERPL-CCNC: 74 U/L (ref 46–116)
ALT SERPL W P-5'-P-CCNC: 87 U/L (ref 12–78)
ANION GAP SERPL CALCULATED.3IONS-SCNC: 10 MMOL/L (ref 4–13)
AST SERPL W P-5'-P-CCNC: 58 U/L (ref 5–45)
BILIRUB SERPL-MCNC: 0.7 MG/DL (ref 0.2–1)
BUN SERPL-MCNC: 8 MG/DL (ref 5–25)
CALCIUM SERPL-MCNC: 9.7 MG/DL (ref 8.3–10.1)
CHLORIDE SERPL-SCNC: 100 MMOL/L (ref 100–108)
CO2 SERPL-SCNC: 27 MMOL/L (ref 21–32)
CREAT SERPL-MCNC: 0.72 MG/DL (ref 0.6–1.3)
ERYTHROCYTE [DISTWIDTH] IN BLOOD BY AUTOMATED COUNT: 13.1 % (ref 11.6–15.1)
GFR SERPL CREATININE-BSD FRML MDRD: 113 ML/MIN/1.73SQ M
GLUCOSE SERPL-MCNC: 121 MG/DL (ref 65–140)
HCT VFR BLD AUTO: 35.6 % (ref 36.5–49.3)
HGB BLD-MCNC: 11.7 G/DL (ref 12–17)
LIPASE SERPL-CCNC: 307 U/L (ref 73–393)
MCH RBC QN AUTO: 31.7 PG (ref 26.8–34.3)
MCHC RBC AUTO-ENTMCNC: 32.9 G/DL (ref 31.4–37.4)
MCV RBC AUTO: 97 FL (ref 82–98)
PLATELET # BLD AUTO: 117 THOUSANDS/UL (ref 149–390)
PMV BLD AUTO: 11.7 FL (ref 8.9–12.7)
POTASSIUM SERPL-SCNC: 4.3 MMOL/L (ref 3.5–5.3)
PROT SERPL-MCNC: 7.2 G/DL (ref 6.4–8.2)
RBC # BLD AUTO: 3.69 MILLION/UL (ref 3.88–5.62)
SODIUM SERPL-SCNC: 137 MMOL/L (ref 136–145)
TRIGL SERPL-MCNC: 151 MG/DL
WBC # BLD AUTO: 6.77 THOUSAND/UL (ref 4.31–10.16)

## 2018-09-17 PROCEDURE — 84478 ASSAY OF TRIGLYCERIDES: CPT | Performed by: INTERNAL MEDICINE

## 2018-09-17 PROCEDURE — 83690 ASSAY OF LIPASE: CPT | Performed by: INTERNAL MEDICINE

## 2018-09-17 PROCEDURE — 85027 COMPLETE CBC AUTOMATED: CPT | Performed by: INTERNAL MEDICINE

## 2018-09-17 PROCEDURE — 99239 HOSP IP/OBS DSCHRG MGMT >30: CPT | Performed by: INTERNAL MEDICINE

## 2018-09-17 PROCEDURE — 80053 COMPREHEN METABOLIC PANEL: CPT | Performed by: INTERNAL MEDICINE

## 2018-09-17 RX ORDER — OXYCODONE HYDROCHLORIDE 5 MG/1
5 TABLET ORAL 3 TIMES DAILY PRN
Qty: 30 TABLET | Refills: 0 | Status: SHIPPED | OUTPATIENT
Start: 2018-09-17 | End: 2018-09-26 | Stop reason: SDUPTHER

## 2018-09-17 RX ADMIN — POTASSIUM CHLORIDE AND SODIUM CHLORIDE 75 ML/HR: 900; 300 INJECTION, SOLUTION INTRAVENOUS at 05:45

## 2018-09-17 RX ADMIN — NIACIN 500 MG: 500 TABLET, FILM COATED, EXTENDED RELEASE ORAL at 08:12

## 2018-09-17 RX ADMIN — NICOTINE 1 PATCH: 21 PATCH TRANSDERMAL at 08:16

## 2018-09-17 RX ADMIN — FENOFIBRATE 145 MG: 145 TABLET, FILM COATED ORAL at 08:12

## 2018-09-17 RX ADMIN — PANCRELIPASE 36000 UNITS: 24000; 76000; 120000 CAPSULE, DELAYED RELEASE PELLETS ORAL at 08:13

## 2018-09-17 RX ADMIN — HYDROMORPHONE HYDROCHLORIDE 1 MG: 1 INJECTION, SOLUTION INTRAMUSCULAR; INTRAVENOUS; SUBCUTANEOUS at 08:07

## 2018-09-17 RX ADMIN — PREGABALIN 75 MG: 75 CAPSULE ORAL at 08:12

## 2018-09-17 RX ADMIN — ENOXAPARIN SODIUM 40 MG: 40 INJECTION SUBCUTANEOUS at 08:12

## 2018-09-17 RX ADMIN — HYDROMORPHONE HYDROCHLORIDE 1 MG: 1 INJECTION, SOLUTION INTRAMUSCULAR; INTRAVENOUS; SUBCUTANEOUS at 04:04

## 2018-09-17 RX ADMIN — PANCRELIPASE 36000 UNITS: 24000; 76000; 120000 CAPSULE, DELAYED RELEASE PELLETS ORAL at 12:15

## 2018-09-17 RX ADMIN — PANTOPRAZOLE SODIUM 40 MG: 40 TABLET, DELAYED RELEASE ORAL at 05:44

## 2018-09-17 RX ADMIN — POTASSIUM CHLORIDE 40 MEQ: 1500 TABLET, EXTENDED RELEASE ORAL at 08:12

## 2018-09-17 NOTE — NURSING NOTE
Patient discharged to home in the care of wife   All discharge teaching complete, AVS reviewed  IV and wristbands removed  All prescriptions and belongings sent home with patient  Patient escorted to elevator by RN, ambulatory  Observed patient discharged safely in stable condition

## 2018-09-17 NOTE — DISCHARGE SUMMARY
Discharge Summary - [facility] Internal Medicine    Patient Information: Lolis Sanchez 37 y o  male MRN: [id number]  Unit/Bed#: -01 Encounter: [id number]    Discharging Physician / Practitioner: William Christensen MD  PCP: Mumtaz Silverio  Admission Date: 9/10/2018  Discharge Date: 09/17/18    Reason for Admission:  Acute on chronic pancreatitis    Discharge Diagnoses:     Principal Problem:    Acute on chronic pancreatitis [facility])  Active Problems:    Smoker    Abnormal transaminases    Hypertriglyceridemia    Chronic pain syndrome    GERD (gastroesophageal reflux disease)    Hyponatremia  Resolved Problems:    Leukocytosis    Present on Admission:   Smoker   Acute on chronic pancreatitis ([address] )   Chronic pain syndrome   GERD (gastroesophageal reflux disease)   Hypertriglyceridemia   Hyponatremia   (Resolved) Leukocytosis   Abnormal transaminases    Consultations During Hospital Stay:  · GI    Procedures Performed:     · CT scan abdomen/pelvis  · Right IJ placement for plasmapheresis which has been removed    Significant Findings:     · Pancreatitis    Incidental Findings:   · As above     Test Results Pending at Discharge (will require follow up): · None     Outpatient Tests Requested:  · None    Complications:  None    Hospital Course:     Lolis Sanchez is a 37 y o  male patient who originally presented to the hospital on 9/10/2018 due to abdominal pain  He has history of hypertriglyceridemia and recurrent pancreatitis  His triglycerides were elevated  He was in ICU and had right-sided catheter placed for plasmapheresis  After few rounds of plasmapheresis, his platelets came down  He made slow progress, however, he is feeling much better today  Tolerating his current diet  Moving his bowels  Advised him to go slow on his diet  He is eager to be discharged home  He would resume most of his home medications on discharge   I also gave him a prescription for his narcotic-as he missed his appointment he had with his pain management team while he was in the hospital   Give him 30 pills with no refills  Advised him not to over uses narcotics whatsoever  He was hydrated with IV fluids and also control his pain with pain medications  Received PPI intravenously  He was advised to follow up with his primary care physician and gastroenterologist soon  Condition at Discharge: fair     Discharge Day Visit / Exam:     Subjective:  Feels much better today  Tolerating his diet  Has less abdominal pain  Moving his bowels  Vitals: Blood Pressure: 124/77 (09/17/18 0715)  Pulse: 86 (09/17/18 0715)  Temperature: 97 8 °F (36 6 °C) (09/17/18 0715)  Temp Source: Oral (09/17/18 0715)  Respirations: 18 (09/17/18 0715)  Height: 5' 11" (180 3 cm) (09/11/18 0510)  Weight - Scale: 95 6 kg (210 lb 12 2 oz) (09/17/18 0600)  SpO2: 95 % (09/17/18 0715)  Exam:        Vital signs are reviewed as above  Patient up in his room  Feels much better  Has less abdominal pain/tenderness  Bowel sounds are audible  His abdomen is less distended  Awake and alert  Oriented x3  Mood and affect are pleasant  Skin is warm and dry  Oropharynx are hydrated  Discharge instructions/Information to patient and family:   See after visit summary for information provided to patient and family  Provisions for Follow-Up Care:  See after visit summary for information related to follow-up care and any pertinent home health orders  Disposition:     Home    For Discharges to [facility]:   · Not Applicable to this Patient - Not Applicable to this Patient    Planned Readmission:  None     Discharge Statement:  I spent 40+ minutes discharging the patient  This time was spent on the day of discharge  I had direct contact with the patient on the day of discharge   Greater than 50% of the total time was spent examining patient, answering all patient questions, arranging and discussing plan of care with patient as well as directly providing post-discharge instructions  Additional time then spent on discharge activities  Discharge Medications:  See after visit summary for reconciled discharge medications provided to patient and family  ** Please Note: Dragon 360 Dictation voice to text software may have been used in the creation of this document   **

## 2018-09-17 NOTE — PLAN OF CARE

## 2018-09-19 ENCOUNTER — OFFICE VISIT (OUTPATIENT)
Dept: FAMILY MEDICINE CLINIC | Facility: CLINIC | Age: 43
End: 2018-09-19
Payer: COMMERCIAL

## 2018-09-19 ENCOUNTER — TELEPHONE (OUTPATIENT)
Dept: FAMILY MEDICINE CLINIC | Facility: CLINIC | Age: 43
End: 2018-09-19

## 2018-09-19 ENCOUNTER — OFFICE VISIT (OUTPATIENT)
Dept: PAIN MEDICINE | Facility: CLINIC | Age: 43
End: 2018-09-19
Payer: COMMERCIAL

## 2018-09-19 VITALS
HEART RATE: 81 BPM | HEIGHT: 71 IN | DIASTOLIC BLOOD PRESSURE: 74 MMHG | WEIGHT: 200 LBS | BODY MASS INDEX: 28 KG/M2 | OXYGEN SATURATION: 98 % | SYSTOLIC BLOOD PRESSURE: 115 MMHG

## 2018-09-19 VITALS
RESPIRATION RATE: 14 BRPM | DIASTOLIC BLOOD PRESSURE: 66 MMHG | WEIGHT: 210 LBS | HEART RATE: 87 BPM | HEIGHT: 71 IN | BODY MASS INDEX: 29.4 KG/M2 | SYSTOLIC BLOOD PRESSURE: 116 MMHG

## 2018-09-19 DIAGNOSIS — K86.1 OTHER CHRONIC PANCREATITIS (HCC): ICD-10-CM

## 2018-09-19 DIAGNOSIS — G89.4 CHRONIC PAIN SYNDROME: Primary | ICD-10-CM

## 2018-09-19 DIAGNOSIS — K85.90 ACUTE ON CHRONIC PANCREATITIS (HCC): ICD-10-CM

## 2018-09-19 DIAGNOSIS — E78.1 HYPERTRIGLYCERIDEMIA: Primary | ICD-10-CM

## 2018-09-19 DIAGNOSIS — K86.1 ACUTE ON CHRONIC PANCREATITIS (HCC): ICD-10-CM

## 2018-09-19 PROCEDURE — 99496 TRANSJ CARE MGMT HIGH F2F 7D: CPT | Performed by: FAMILY MEDICINE

## 2018-09-19 PROCEDURE — 99214 OFFICE O/P EST MOD 30 MIN: CPT | Performed by: ANESTHESIOLOGY

## 2018-09-19 PROCEDURE — 1111F DSCHRG MED/CURRENT MED MERGE: CPT | Performed by: FAMILY MEDICINE

## 2018-09-19 RX ORDER — PREGABALIN 100 MG/1
100 CAPSULE ORAL 3 TIMES DAILY
Qty: 90 CAPSULE | Refills: 2 | Status: SHIPPED | OUTPATIENT
Start: 2018-09-19 | End: 2019-01-10 | Stop reason: SDUPTHER

## 2018-09-19 NOTE — PROGRESS NOTES
Assessment:  1  Chronic pain syndrome  pregabalin (LYRICA) 100 mg capsule       Plan: This is a 25-year-old male who presents with chronic abdominal pain secondary to chronic pancreatitis  The patient has had a series of splanchnic nerve block in the past which helped relieve his pain symptoms  His residual pain is managed with Lyrica 75 mg 2 capsules b i d         Abdominal pain has gradually returned  At this time, I will schedule him for repeat bilateral splanchnic nerve block  Procedure will be scheduled at the hospital under mac sedation  In the interim, patient with continued on Lyrica  I will write for a prescription for Lyrica 100 mg p o  3 times a day  with 2 refills  Complete risks and benefits including bleeding, infection, tissue reaction, nerve injury and allergic reaction were discussed  The approach was demonstrated using models and literature was provided  Verbal and written consent was obtained  My impressions and treatment recommendations were discussed in detail with the patient who verbalized understanding and had no further questions  Discharge instructions were provided  I personally saw and examined the patient and I agree with the above discussed plan of care  History of Present Illness:  Greg Freedman is a 37 y o  male who presents for a follow up office visit in regards to Abdominal Pain  The patients current symptoms include chronic abdominal pain which he describes as sharp  Chronic abdominal pain is due to chronic pancreatitis  The patient has had a series of bilateral splanchnic nerve blocks  Patient reports recent admission to the hospital due to abdominal pain flare-up from chronic pancreatitis  Last bilateral splanchnic nerve block was performed approximately 4 months ago  Patient is due for repeat injection      In the interim, his pain is managed with  Lyrica 75 mg 2 capsules b i d     I have personally reviewed and/or updated the patient's past medical history, past surgical history, family history, social history, current medications, allergies, and vital signs today  Review of Systems   Respiratory: Negative for shortness of breath  Cardiovascular: Negative for chest pain  Gastrointestinal: Positive for nausea  Negative for constipation, diarrhea and vomiting  Musculoskeletal: Negative for arthralgias, gait problem, joint swelling and myalgias  Skin: Negative for rash  Neurological: Negative for dizziness, seizures and weakness  All other systems reviewed and are negative  Patient Active Problem List   Diagnosis    Pancreatic lesion    Renal mass    Smoker    RBBB    Epigastric pain    Acute gastritis without hemorrhage    Abdominal pain    Abnormal transaminases    Acute on chronic pancreatitis (HCC)    Leukopenia    Hypertriglyceridemia    Chronic pain syndrome    Esophagitis    Other chronic pancreatitis (HCC)    Chronic gastritis without bleeding    Uncomplicated opioid dependence (HCC)    Long-term current use of opiate analgesic    GERD (gastroesophageal reflux disease)    Hyponatremia       Past Medical History:   Diagnosis Date    Asthma     Chronic pain disorder     GERD (gastroesophageal reflux disease)     Hyperlipidemia     Liver abscess     Meniscus tear     left knee work injury last assessed 08/24/2016    Pancreatitis     Pneumonia        Past Surgical History:   Procedure Laterality Date    CHOLECYSTECTOMY      ESOPHAGOGASTRODUODENOSCOPY N/A 11/16/2017    Procedure: ESOPHAGOGASTRODUODENOSCOPY (EGD); Surgeon: Artur Goel MD;  [facility]; Service: Gastroenterology    ESOPHAGOGASTRODUODENOSCOPY N/A 4/19/2018    Procedure: ESOPHAGOGASTRODUODENOSCOPY (EGD); Surgeon: Zoe Matos MD;  [facility]; Service: Gastroenterology    ESOPHAGOGASTRODUODENOSCOPY N/A 5/10/2018    Procedure: ESOPHAGOGASTRODUODENOSCOPY (EGD); Surgeon: Pennie High MD;  [facility];   Service: Gastroenterology    KNEE ARTHROSCOPY Bilateral     KNEE ARTHROSCOPY Right 2009    cibsandrine last assessed 08/24/2016   [facility] LIVER SURGERY      NERVE BLOCK Bilateral 5/29/2018    Procedure: SPLANCHNIC NERVE BLOCK;  Surgeon: Lorne Fierro MD;  [facility];  Service: Pain Management     PANCREAS SURGERY      stents    DE INJECT NERV BLCK,CELIAC PLEXUS Bilateral 5/9/2017    Procedure: CELIAC PLEXUS BLOCK ;  Surgeon: Lorne Fierro MD;  [facility];  Service: Pain Management     DE INJECT NERV BLCK,CELIAC PLEXUS Bilateral 6/1/2017    Procedure: SPLANCHNIC NERVE BLOCK at T12;  Surgeon: Lorne Fierro MD;  [facility];  Service: Pain Management     DE INJECT NERV BLCK,CELIAC PLEXUS Bilateral 8/8/2017    Procedure: BILATERAL SPLANCHNIC NERVE BLOCK T12;  Surgeon: Lorne Fierro MD;  [facility];  Service: Pain Management     DE INJECT NERV Degroot Gravely Bilateral 12/28/2017    Procedure: SPLANCHNIC NERVE BLOCK;  Surgeon: Lorne Fierro MD;  [facility];  Service: Pain Management     DE LAP,CHOLECYSTECTOMY N/A 2/14/2017    Procedure: LAPAROSCOPIC CHOLECYSTECTOMY, IOC, POSSIBLE OPEN ;  Surgeon: Janneth Watts MD;  [facility];  Service: General    ROTATOR CUFF REPAIR Right     SHOULDER ARTHROSCOPY      SHOULDER ARTHROSCOPY Right        Family History   Problem Relation Age of Onset    Cirrhosis Mother     Heart disease Other         cardiac disorder    Cancer Other        Social History     Occupational History    fulltime employment      Social History Main Topics    Smoking status: Current Every Day Smoker     Packs/day: 0 50     Years: 20 00    Smokeless tobacco: Never Used    Alcohol use 0 6 oz/week     1 Cans of beer per week      Comment: rarely, occasionally, history of no alcohol use per allscripts    Drug use: No    Sexual activity: No       Current Outpatient Prescriptions on File Prior to Visit   Medication Sig    acetaminophen (TYLENOL) 325 mg tablet Take 2 tablets (650 mg total) by mouth every 6 (six) hours as needed for fever    Choline Fenofibrate (FENOFIBRIC ACID) 135 MG CPDR Take 1 capsule by mouth daily    EPINEPHrine (EPIPEN 2-ARIEL) 0 3 mg/0 3 mL SOAJ EpiPen 2-Ariel 0 3 MG/0 3ML Injection Solution Auto-injector  INJECT 0 3ML INTRAMUSCULARLY AS DIRECTED     Quantity: 1;  Refills: 1      Toro Briceño ; Active  2 Solution Auto-injector Pen    FLAXSEED, LINSEED, PO Take 1,000 mg by mouth 3 (three) times a day    niacin (NIASPAN) 500 mg CR tablet TAKE ONE TABLET BY MOUTH EVERY DAY WITH BREAKFAST     ondansetron (ZOFRAN-ODT) 4 mg disintegrating tablet DISSOLVE ONE TABLET IN MOUTH EVERY 8 HOURS AS NEEDED FOR NAUSEA AND VOMITING     oxyCODONE (ROXICODONE) 5 mg immediate release tablet Take 1 tablet (5 mg total) by mouth 3 (three) times a day as needed for moderate pain for up to 10 days Max Daily Amount: 15 mg    pancrelipase, Lip-Prot-Amyl, (CREON) 12,000 units capsule Take 12,000 units of lipase by mouth 3 (three) times a day with meals      Pancrelipase, Lip-Prot-Amyl, (CREON) 23639 units CPEP Take 1 capsule (36,000 Units total) by mouth 3 (three) times a day before meals    ranitidine (ZANTAC) 150 MG capsule Take 150 mg by mouth 2 (two) times a day    rosuvastatin (CRESTOR) 40 MG tablet Take 1 tablet (40 mg total) by mouth daily    traZODone (DESYREL) 50 mg tablet Take 1 tablet (50 mg total) by mouth daily at bedtime    VENTOLIN  (90 Base) MCG/ACT inhaler INHALE 1 TO 2 PUFFS BY MOUTH EVERY 4 TO 6 HOURS as needed     niacin (NIASPAN) 500 mg CR tablet Take 1 tablet (500 mg total) by mouth daily with breakfast for 90 days    omega-3-acid ethyl esters (LOVAZA) 1 g capsule Take 2 capsules (2 g total) by mouth 2 (two) times a day for 90 days    pantoprazole (PROTONIX) 40 mg tablet Take 1 tablet (40 mg total) by mouth 2 (two) times a day for 90 days    pregabalin (LYRICA) 75 mg capsule Take 1 capsule (75 mg total) by mouth 3 (three) times a day for 30 days (Patient taking differently: Take 150 mg by mouth 2 (two) times a day  )     Current Facility-Administered Medications on File Prior to Visit   Medication    oxyCODONE-acetaminophen (PERCOCET) 5-325 mg per tablet 2 tablet       Allergies   Allergen Reactions    Bee Venom Swelling       Physical Exam:    /66   Pulse 87   Resp 14   Ht 5' 11" (1 803 m)   Wt 95 3 kg (210 lb)   BMI 29 29 kg/m²     Constitutional:normal, well developed, well nourished, alert, in no distress and non-toxic and no overt pain behavior    Eyes:anicteric  HEENT:grossly intact  Neck:supple, symmetric, trachea midline and no masses   Pulmonary:even and unlabored  Cardiovascular:No edema or pitting edema present  Skin:Normal without rashes or lesions and well hydrated  Psychiatric:Mood and affect appropriate  Neurologic:Cranial Nerves II-XII grossly intact  Musculoskeletal:normal    Imaging

## 2018-09-19 NOTE — PROGRESS NOTES
Assessment/Plan:     No problem-specific Assessment & Plan notes found for this encounter  Diagnoses and all orders for this visit:    Hypertriglyceridemia  -     Ambulatory referral to [facility]; Future    Acute on chronic pancreatitis [facility])      discussed plan care patient at this point will continue current medications, will seek out evaluation with lipid spec    Subjective:     Patient ID: Magi Sorensen is a 37 y o  male  F/u on the hosp   This all started last Monday , started to feel Ill , kept building , where then had to go to ER   Doing well since they did the procedure to remove the fats/chol from the blood ,plasmapheresis,  Did has been recommended to be seen by a lipid specialist /clinic to evaluate for potential new treatments/medications   not sure where to go, no referrals have been made through the hospital, has yet to research through insurance company willing to go to   [place] or [place]   currently well, negative nausea vomiting diarrhea constipation  Continues to see  Pain management for chronic pain pancreatitis   MD Brittany Hernandez , has had nerve block , multiple time , has recently increased leyrica to TID dosing ,         Review of Systems   Constitutional: Negative for appetite change, chills, fever and unexpected weight change  HENT: Negative for congestion, dental problem, ear pain, hearing loss, postnasal drip, rhinorrhea, sinus pain, sinus pressure, sneezing, sore throat, tinnitus and voice change  Eyes: Negative for visual disturbance  Respiratory: Negative for apnea, cough, chest tightness and shortness of breath  Cardiovascular: Negative for chest pain, palpitations and leg swelling  Gastrointestinal: Negative for abdominal pain, blood in stool, constipation, diarrhea, nausea and vomiting  Endocrine: Negative for cold intolerance, heat intolerance, polydipsia, polyphagia and polyuria     Genitourinary: Negative for decreased urine volume, difficulty urinating, dysuria, frequency and hematuria  Musculoskeletal: Negative for arthralgias, back pain, gait problem, joint swelling and myalgias  Skin: Negative for color change, rash and wound  Allergic/Immunologic: Negative for environmental allergies and food allergies  Neurological: Negative for dizziness, syncope, weakness, light-headedness, numbness and headaches  Hematological: Negative for adenopathy  Does not bruise/bleed easily  Psychiatric/Behavioral: Negative for sleep disturbance and suicidal ideas  The patient is not nervous/anxious  Objective:     Physical Exam   Constitutional: He is oriented to person, place, and time  He appears well-developed and well-nourished  HENT:   Head: Normocephalic and atraumatic  Eyes: EOM are normal    Neck: Normal range of motion  Neck supple  Cardiovascular: Normal rate, regular rhythm and normal heart sounds  Pulmonary/Chest: Effort normal and breath sounds normal    Abdominal: Soft  Bowel sounds are normal    Musculoskeletal: Normal range of motion  Neurological: He is alert and oriented to person, place, and time  Skin: Skin is warm and dry  Psychiatric: He has a normal mood and affect  His behavior is normal  Judgment and thought content normal          Vitals:    09/19/18 1244   BP: 115/74   Pulse: 81   SpO2: 98%   Weight: 90 7 kg (200 lb)   Height: 5' 11" (1 803 m)       Transitional Care Management Review:  Greg Freedman is a 37 y o  male here for TCM follow up       During the TCM phone call patient stated:    Date and time hospital follow up call was made:  9/17/2018  1:07 PM  Hospital care reviewed:  Records reviewed  Patient was hopsitalized at:  [facility]  Date of admission:  9/10/18  Date of discharge:  9/17/18  Diagnosis:  Acute on chronic pancreatitis [facility])   Disposition:  Home  Were the patients medicaitons reviewed and updated:  No  Current symptoms:  (Comment: Patient complains of pain)  Should patient be enrolled in anticoag monitoring?:  No  Scheduled for follow up?:  Yes  Did you obtain your prescribed medications:  Yes  Do you need help managing your perscriptions or medications:  No  Is transportation to your appointments needed:  No  I have advised the patient to call PCP with any new or worsening symptoms (please type in name along with any credentials):  Adolfo Carr MA  Living Arrangements:  Family members  Are you recieving outpatient services:  No  Are you recieving home care services:  No  Are you using any community resources:  No  Current waiver service:  No  Have you fallen in the last 12 months:  No  Interperter language line required?:  No  Counseling:  Family  Comments:  Patient was advised to consider seeing a specialist out in [place] [id number] for his condition             LUANA Silveira

## 2018-09-19 NOTE — TELEPHONE ENCOUNTER
Patients states the was looking up a lipid doctors and he found named DAINA Orellana   He would like you to inquire about the doctor for him her is a cardiologist but the patient states that specializes in lipids    Please advise should the patient schedule an appt and if so can you please put the the in

## 2018-09-20 ENCOUNTER — TELEPHONE (OUTPATIENT)
Dept: PAIN MEDICINE | Facility: CLINIC | Age: 43
End: 2018-09-20

## 2018-09-26 ENCOUNTER — TELEPHONE (OUTPATIENT)
Dept: RADIOLOGY | Facility: CLINIC | Age: 43
End: 2018-09-26

## 2018-09-26 DIAGNOSIS — G89.4 CHRONIC PAIN SYNDROME: ICD-10-CM

## 2018-09-26 RX ORDER — OXYCODONE HYDROCHLORIDE 5 MG/1
5 TABLET ORAL 3 TIMES DAILY PRN
Qty: 30 TABLET | Refills: 0 | Status: SHIPPED | OUTPATIENT
Start: 2018-09-26 | End: 2018-10-09 | Stop reason: SDUPTHER

## 2018-09-26 NOTE — TELEPHONE ENCOUNTER
S/w pt and informed Splanchnic Nerve Block is scheduled for 10/29/18 @ 2pm at [facility] see below for med request

## 2018-09-26 NOTE — TELEPHONE ENCOUNTER
----- Message from Darryl Holbrook RN sent at 9/26/2018 10:11 AM EDT -----  Regarding: FW: Non-Urgent Medical Question  Contact: [phone]   See pt request below about scheduling  Please forward to SI when completed to address pt's pain medication request       ----- Message -----  From: Valerie Juan  Sent: 9/26/2018  10:07 AM  To: [facility]  Subject: Non-Urgent Medical Question                      I was contacting you in regards to the scheduling of my procedure  It has been a week since my visit and have not been contacted as to the scheduling     I was also contacting to see if Dr Mark Saha could give me something for the worsening pain

## 2018-09-26 NOTE — TELEPHONE ENCOUNTER
I sent over electronically a short term prescription for oxycodone 5mg po tid prn, # 30 tablets until he gets his injection

## 2018-10-01 DIAGNOSIS — K21.9 GASTROESOPHAGEAL REFLUX DISEASE WITHOUT ESOPHAGITIS: ICD-10-CM

## 2018-10-01 DIAGNOSIS — E78.49 OTHER HYPERLIPIDEMIA: ICD-10-CM

## 2018-10-01 RX ORDER — NIACIN 500 MG/1
TABLET, EXTENDED RELEASE ORAL
Qty: 90 TABLET | Refills: 0 | Status: SHIPPED | OUTPATIENT
Start: 2018-10-01 | End: 2018-11-21 | Stop reason: SDUPTHER

## 2018-10-01 RX ORDER — PANTOPRAZOLE SODIUM 40 MG/1
TABLET, DELAYED RELEASE ORAL
Qty: 180 TABLET | Refills: 0 | Status: SHIPPED | OUTPATIENT
Start: 2018-10-01 | End: 2019-01-10 | Stop reason: SDUPTHER

## 2018-10-05 DIAGNOSIS — K86.1 CHRONIC PANCREATITIS, UNSPECIFIED PANCREATITIS TYPE (HCC): ICD-10-CM

## 2018-10-06 ENCOUNTER — HOSPITAL ENCOUNTER (EMERGENCY)
Facility: HOSPITAL | Age: 43
Discharge: HOME | End: 2018-10-06
Attending: EMERGENCY MEDICINE | Admitting: EMERGENCY MEDICINE
Payer: COMMERCIAL

## 2018-10-06 ENCOUNTER — APPOINTMENT (EMERGENCY)
Dept: RADIOLOGY | Facility: HOSPITAL | Age: 43
End: 2018-10-06
Payer: COMMERCIAL

## 2018-10-06 ENCOUNTER — APPOINTMENT (EMERGENCY)
Dept: ULTRASOUND IMAGING | Facility: HOSPITAL | Age: 43
End: 2018-10-06
Payer: COMMERCIAL

## 2018-10-06 VITALS
TEMPERATURE: 98.3 F | RESPIRATION RATE: 16 BRPM | BODY MASS INDEX: 27.89 KG/M2 | WEIGHT: 199.96 LBS | SYSTOLIC BLOOD PRESSURE: 121 MMHG | OXYGEN SATURATION: 97 % | HEART RATE: 74 BPM | DIASTOLIC BLOOD PRESSURE: 93 MMHG

## 2018-10-06 DIAGNOSIS — M72.2 PLANTAR FASCIITIS OF LEFT FOOT: ICD-10-CM

## 2018-10-06 DIAGNOSIS — M25.472 BILATERAL SWELLING OF FEET AND ANKLES: Primary | ICD-10-CM

## 2018-10-06 DIAGNOSIS — M79.605 PAIN IN BOTH LOWER EXTREMITIES: ICD-10-CM

## 2018-10-06 DIAGNOSIS — M25.471 BILATERAL SWELLING OF FEET AND ANKLES: Primary | ICD-10-CM

## 2018-10-06 DIAGNOSIS — M79.604 PAIN IN BOTH LOWER EXTREMITIES: ICD-10-CM

## 2018-10-06 DIAGNOSIS — M25.474 BILATERAL SWELLING OF FEET AND ANKLES: Primary | ICD-10-CM

## 2018-10-06 DIAGNOSIS — M25.475 BILATERAL SWELLING OF FEET AND ANKLES: Primary | ICD-10-CM

## 2018-10-06 LAB
ALBUMIN SERPL BCP-MCNC: 4 G/DL (ref 3.5–5)
ALP SERPL-CCNC: 93 U/L (ref 46–116)
ALT SERPL W P-5'-P-CCNC: 37 U/L (ref 12–78)
ANION GAP SERPL CALCULATED.3IONS-SCNC: 8 MMOL/L (ref 4–13)
AST SERPL W P-5'-P-CCNC: 30 U/L (ref 5–45)
BILIRUB SERPL-MCNC: 0.3 MG/DL (ref 0.2–1)
BUN SERPL-MCNC: 12 MG/DL (ref 5–25)
CALCIUM SERPL-MCNC: 8.8 MG/DL (ref 8.3–10.1)
CHLORIDE SERPL-SCNC: 102 MMOL/L (ref 100–108)
CO2 SERPL-SCNC: 27 MMOL/L (ref 21–32)
CREAT SERPL-MCNC: 0.87 MG/DL (ref 0.6–1.3)
GFR SERPL CREATININE-BSD FRML MDRD: 104 ML/MIN/1.73SQ M
GLUCOSE SERPL-MCNC: 105 MG/DL (ref 65–140)
NT-PROBNP SERPL-MCNC: 42 PG/ML
POTASSIUM SERPL-SCNC: 4.1 MMOL/L (ref 3.5–5.3)
PROT SERPL-MCNC: 7 G/DL (ref 6.4–8.2)
SODIUM SERPL-SCNC: 137 MMOL/L (ref 136–145)

## 2018-10-06 PROCEDURE — 93970 EXTREMITY STUDY: CPT | Performed by: SURGERY

## 2018-10-06 PROCEDURE — 80053 COMPREHEN METABOLIC PANEL: CPT | Performed by: EMERGENCY MEDICINE

## 2018-10-06 PROCEDURE — 73630 X-RAY EXAM OF FOOT: CPT

## 2018-10-06 PROCEDURE — 83880 ASSAY OF NATRIURETIC PEPTIDE: CPT | Performed by: EMERGENCY MEDICINE

## 2018-10-06 PROCEDURE — 93970 EXTREMITY STUDY: CPT

## 2018-10-06 PROCEDURE — 99284 EMERGENCY DEPT VISIT MOD MDM: CPT

## 2018-10-06 PROCEDURE — 36415 COLL VENOUS BLD VENIPUNCTURE: CPT | Performed by: EMERGENCY MEDICINE

## 2018-10-06 RX ORDER — NAPROXEN 250 MG/1
500 TABLET ORAL ONCE
Status: COMPLETED | OUTPATIENT
Start: 2018-10-06 | End: 2018-10-06

## 2018-10-06 RX ORDER — NAPROXEN 500 MG/1
500 TABLET ORAL 2 TIMES DAILY WITH MEALS
Qty: 20 TABLET | Refills: 0 | Status: ON HOLD | OUTPATIENT
Start: 2018-10-06 | End: 2018-10-29 | Stop reason: ALTCHOICE

## 2018-10-06 RX ORDER — HYDROCODONE BITARTRATE AND ACETAMINOPHEN 5; 325 MG/1; MG/1
1 TABLET ORAL EVERY 6 HOURS PRN
Qty: 12 TABLET | Refills: 0 | Status: SHIPPED | OUTPATIENT
Start: 2018-10-06 | End: 2018-10-09

## 2018-10-06 RX ORDER — OXYCODONE HYDROCHLORIDE AND ACETAMINOPHEN 5; 325 MG/1; MG/1
1 TABLET ORAL ONCE
Status: COMPLETED | OUTPATIENT
Start: 2018-10-06 | End: 2018-10-06

## 2018-10-06 RX ADMIN — OXYCODONE HYDROCHLORIDE AND ACETAMINOPHEN 1 TABLET: 5; 325 TABLET ORAL at 13:42

## 2018-10-06 RX ADMIN — NAPROXEN 500 MG: 250 TABLET ORAL at 13:42

## 2018-10-06 NOTE — DISCHARGE INSTRUCTIONS
Please return if he developed worsening or other concerning symptoms otherwise please cut back on your Lyrica from 3 pills to 2 pills follow up with family doctor for re-evaluation in 2-3 days as instructed as discussed    Leg Edema   WHAT YOU NEED TO KNOW:   Leg edema is swelling caused by fluid buildup  Your legs may swell if you sit or stand for long periods of time, are pregnant, or are injured  Swelling may also occur if you have heart failure or circulation problems  This means that your heart does not pump blood through your body as it should  DISCHARGE INSTRUCTIONS:   Self-care:   · Elevate your legs:  Raise your legs above the level of your heart as often as you can  This will help decrease swelling and pain  Prop your legs on pillows or blankets to keep them elevated comfortably  · Wear pressure stockings: These tight stockings put pressure on your legs to promote blood flow and prevent blood clots  Wear the stockings during the day  Do not wear them while you sleep  · Apply heat:  Heat helps decrease pain and swelling  Apply heat on the area for 20 to 30 minutes every 2 hours for as many days as directed  · Stay active:  Do not stand or sit for long periods of time  Ask your healthcare provider about the best exercise plan for you  · Eat healthy foods:  Healthy foods include fruits, vegetables, whole-grain breads, low-fat dairy products, beans, lean meats, and fish  Ask if you need to be on a special diet  Limit salt  Salt will make your body hold even more fluid  Follow up with your healthcare provider as directed:  Write down your questions so you remember to ask them during your visits  Contact your healthcare provider if:   · You have a fever or feel more tired than usual     · The veins in your legs look larger than usual  They may look full or bulging  · Your legs itch or feel heavy  · You have red or white areas or sores on your legs   The skin may also appear dimpled or have indentations  · You are gaining weight  · You have trouble moving your ankles  · The swelling does not go away, or other parts of your body swell  · You have questions or concerns about your condition or care  Return to the emergency department if:   · You cannot walk  · You feel faint or confused  · Your skin turns blue or gray  · Your leg feels warm, tender, and painful  It may be swollen and red  · You have chest pain or trouble breathing that is worse when you lie down  · You suddenly feel lightheaded and have trouble breathing  · You have new and sudden chest pain  You may have more pain when you take deep breaths or cough  You may also cough up blood  © 2017 [address] Information is for End User's use only and may not be sold, redistributed or otherwise used for commercial purposes  All illustrations and images included in CareNotes® are the copyrighted property of [employer]  or [employer]  The above information is an  only  It is not intended as medical advice for individual conditions or treatments  Talk to your doctor, nurse or pharmacist before following any medical regimen to see if it is safe and effective for you  Plantar Fasciitis   WHAT YOU NEED TO KNOW:   Rest, ice, and foot supports can help your plantar fascia heal  You may need medicines to decrease swelling and pain  A physical therapist can teach you exercises to help improve movement and strength, and to decrease pain  DISCHARGE INSTRUCTIONS:   Contact your healthcare provider or podiatrist if:   · Your pain and swelling increase  · You develop knee, hip, or back pain  · You have questions or concerns about your condition or care  Medicines: You may  need any of the following:  · Acetaminophen  decreases pain and fever  It is available without a doctor's order  Ask how much to give your child and how often to give it  Follow directions   Read the labels of all other medicines your child uses to see if they also contain acetaminophen, or ask your child's doctor or pharmacist  Acetaminophen can cause liver damage if not taken correctly  · NSAIDs , such as ibuprofen, help decrease swelling, pain, and fever  NSAIDs can cause stomach bleeding or kidney problems in certain people  If you take blood thinner medicine, always ask your healthcare provider if NSAIDs are safe for you  Always read the medicine label and follow directions  · Take your medicine as directed  Contact your healthcare provider if you think your medicine is not helping or if you have side effects  Tell him of her if you are allergic to any medicine  Keep a list of the medicines, vitamins, and herbs you take  Include the amounts, and when and why you take them  Bring the list or the pill bottles to follow-up visits  Carry your medicine list with you in case of an emergency  Self-care:   · Wear your splint or shoe inserts as directed  You may need to wear a splint at night to keep your foot stretched while you sleep  This will help prevent sharp pain first thing in the morning  Shoe inserts will help decrease stress on your plantar fascia when you walk or exercise  · Rest as directed  Rest as much as possible to decrease swelling and prevent more damage  Ask your healthcare provider when you can return to your normal activities  · Apply ice on your plantar fascia  Ice helps prevent tissue damage and decreases swelling and pain  Fill a water bottle with water and freeze it  Wrap a towel around the bottle or cover it with a pillow case  Roll the water bottle under your foot for 10 minutes in the morning and after work  · Massage your plantar fascia as directed  This may help decrease swelling and pain  Roll a golf ball under your foot for 10 minutes  Repeat 3 times each day  · Go to physical therapy as directed    A physical therapist teaches you exercises to help improve movement and strength, and to decrease pain  Prevent plantar fasciitis:   · Maintain a healthy weight  This will help decrease stress on your feet  Ask your healthcare provider how much you should weigh  Ask him to help you create a weight loss plan if you are overweight  · Do low-impact exercises  Low-impact exercises decrease stress on your plantar fascia  Examples include swimming or bicycling  · Start new activities slowly  Increase the intensity and time gradually  · Wear shoes that fit well and support your arch  Replace your shoes before the padding or shock absorption wears out  Do not walk or  bare feet or sandals for long periods of time  · Stretch before you exercise  Ask your healthcare provider how to stretch your plantar fascia and calf muscles  Follow up with your healthcare provider or podiatrist as directed:  Write down your questions so you remember to ask them during your visits  © 2017 [address] Information is for End User's use only and may not be sold, redistributed or otherwise used for commercial purposes  All illustrations and images included in CareNotes® are the copyrighted property of [employer]  or [employer]  The above information is an  only  It is not intended as medical advice for individual conditions or treatments  Talk to your doctor, nurse or pharmacist before following any medical regimen to see if it is safe and effective for you  Leg Pain   WHAT YOU NEED TO KNOW:   Leg pain may be caused by a variety of health conditions  Your tests did not show any broken bones or blood clots  DISCHARGE INSTRUCTIONS:   Return to the emergency department if:   · You have a fever  · Your leg starts to swell  · Your leg pain gets worse  · You have numbness or tingling in your leg or toes  · You cannot put any weight on or move your leg    Contact your healthcare provider if:   · Your pain does not decrease, even after treatment  · You have questions or concerns about your condition or care  Medicines:   · NSAIDs , such as ibuprofen, help decrease swelling, pain, and fever  This medicine is available with or without a doctor's order  NSAIDs can cause stomach bleeding or kidney problems in certain people  If you take blood thinner medicine, always ask your healthcare provider if NSAIDs are safe for you  Always read the medicine label and follow directions  · Take your medicine as directed  Contact your healthcare provider if you think your medicine is not helping or if you have side effects  Tell him of her if you are allergic to any medicine  Keep a list of the medicines, vitamins, and herbs you take  Include the amounts, and when and why you take them  Bring the list or the pill bottles to follow-up visits  Carry your medicine list with you in case of an emergency  Follow up with your healthcare provider as directed: You may need more tests to find the cause of your leg pain  You may need to see an orthopedic specialist or a physical therapist  Write down your questions so you remember to ask them during your visits  Manage your leg pain:   · Rest  your injured leg so that it can heal  You may need an immobilizer, brace, or splint to limit the movement of your leg  You may need to avoid putting any weight on your leg for at least 48 hours  Return to normal activities as directed  · Ice  the injury for 20 minutes every 4 hours for up to 24 hours, or as directed  Use an ice pack, or put crushed ice in a plastic bag  Cover it with a towel to protect your skin  Ice helps prevent tissue damage and decreases swelling and pain  · Elevate  your injured leg above the level of your heart as often as you can  This will help decrease swelling and pain  If possible, prop your leg on pillows or blankets to keep the area elevated comfortably  · Use assistive devices as directed    You may need to use a cane or crutches  Assistive devices help decrease pain and pressure on your leg when you walk  Ask your healthcare provider for more information about assistive devices and how to use them correctly  · Maintain a healthy weight  Extra body weight can cause pressure and pain in your hip, knee, and ankle joints  Ask your healthcare provider how much you should weigh  Ask him to help you create a weight loss plan if you are overweight  © 2017 [address] [facility] Information is for End User's use only and may not be sold, redistributed or otherwise used for commercial purposes  All illustrations and images included in CareNotes® are the copyrighted property of [employer]  or [employer]  The above information is an  only  It is not intended as medical advice for individual conditions or treatments  Talk to your doctor, nurse or pharmacist before following any medical regimen to see if it is safe and effective for you

## 2018-10-06 NOTE — ED PROVIDER NOTES
History  Chief Complaint   Patient presents with    Foot Swelling     pt co of increased B/L feet and ankle swelling noticed yesterday, pt tripped and fell off last step yesterday, not sure if that is related      70-year-old male with history of chronic pancreatitis, chronic pain, presenting for evaluation of bilateral foot pain swelling  Patient is here with his wife reports over the last couple days he has noticed he has had increasing pain in both of his feet primarily his left plantar arch radiates to the dorsum of his left foot and lateral ankle  He states over the last day or so he noticed that both of his lateral ankles were mildly swollen, he tripped off of a step yesterday and thought it might be related to this  The pain does radiate up his legs he does have a burning on the dorsums of both of his feet as well to his mid shin, the pain is otherwise nonradiating it is worse when he walks and moves particularly bad when he steps off of his left foot better with his home medications, there are no other associated symptoms with this  No history of similar he thought it might be related to his increased dose of Lyrica which was double 2 weeks ago  Otherwise patient denies fevers chills chest pain cough shortness of breath weakness paresthesias or anesthesia nausea vomiting abdominal pain flank or back pain other joint pain swelling rashes skin color changes temperature changes he has no other injuries complaints or concerns otherwise denies a complete review systems as noted he has no signs symptoms of heart failure, no other medications no other signs symptoms risk factors for DVT or PE other than hospitalization last 30 days            Prior to Admission Medications   Prescriptions Last Dose Informant Patient Reported? Taking?    Choline Fenofibrate (FENOFIBRIC ACID) 135 MG CPDR  Self Yes No   Sig: Take 1 capsule by mouth daily   EPINEPHrine (EPIPEN 2-ARIEL) 0 3 mg/0 3 mL SOAJ  Self Yes No   Sig: EpiPen 2-Larry 0 3 MG/0 3ML Injection Solution Auto-injector  INJECT 0 3ML INTRAMUSCULARLY AS DIRECTED     Quantity: 1;  Refills: 1      Toro Briceño ; Active  2 Solution Auto-injector Pen   FLAXSEED, LINSEED, PO  Self Yes No   Sig: Take 1,000 mg by mouth 3 (three) times a day   Pancrelipase, Lip-Prot-Amyl, (CREON) 57895 units CPEP   No No   Sig: Take 1 capsule (36,000 Units total) by mouth 3 (three) times a day before meals   VENTOLIN  (90 Base) MCG/ACT inhaler   No No   Sig: INHALE 1 TO 2 PUFFS BY MOUTH EVERY 4 TO 6 HOURS as needed    acetaminophen (TYLENOL) 325 mg tablet   No No   Sig: Take 2 tablets (650 mg total) by mouth every 6 (six) hours as needed for fever   niacin (NIASPAN) 500 mg CR tablet   No No   Sig: TAKE ONE TABLET BY MOUTH EVERY DAY WITH BREAKFAST    niacin (NIASPAN) 500 mg CR tablet   No No   Sig: TAKE 1 TABLET DAILY WITH BREAKFAST   omega-3-acid ethyl esters (LOVAZA) 1 g capsule  Self No No   Sig: Take 2 capsules (2 g total) by mouth 2 (two) times a day for 90 days   ondansetron (ZOFRAN-ODT) 4 mg disintegrating tablet   No No   Sig: DISSOLVE ONE TABLET IN MOUTH EVERY 8 HOURS AS NEEDED FOR NAUSEA AND VOMITING    oxyCODONE (ROXICODONE) 5 mg immediate release tablet   No No   Sig: Take 1 tablet (5 mg total) by mouth 3 (three) times a day as needed for moderate pain for up to 10 days Max Daily Amount: 15 mg   pancrelipase, Lip-Prot-Amyl, (CREON) 12,000 units capsule  Self Yes No   Sig: Take 12,000 units of lipase by mouth 3 (three) times a day with meals     pantoprazole (PROTONIX) 40 mg tablet   No No   Sig: TAKE 1 TABLET TWICE A DAY   pregabalin (LYRICA) 100 mg capsule   No No   Sig: Take 1 capsule (100 mg total) by mouth 3 (three) times a day for 30 days   pregabalin (LYRICA) 75 mg capsule   No No   Sig: Take 1 capsule (75 mg total) by mouth 3 (three) times a day for 30 days   Patient taking differently: Take 150 mg by mouth 2 (two) times a day     ranitidine (ZANTAC) 150 MG capsule   Yes No Sig: Take 150 mg by mouth 2 (two) times a day   rosuvastatin (CRESTOR) 40 MG tablet   No No   Sig: Take 1 tablet (40 mg total) by mouth daily   traZODone (DESYREL) 50 mg tablet   No No   Sig: Take 1 tablet (50 mg total) by mouth daily at bedtime      Facility-Administered Medications: None       Past Medical History:   Diagnosis Date    Asthma     Chronic pain disorder     GERD (gastroesophageal reflux disease)     Hyperlipidemia     Liver abscess     Meniscus tear     left knee work injury last assessed 08/24/2016    Pancreatitis     Pneumonia        Past Surgical History:   Procedure Laterality Date    CHOLECYSTECTOMY      ESOPHAGOGASTRODUODENOSCOPY N/A 11/16/2017    Procedure: ESOPHAGOGASTRODUODENOSCOPY (EGD); Surgeon: Rebekah Martinez MD;  Location: [facility]; Service: Gastroenterology    ESOPHAGOGASTRODUODENOSCOPY N/A 4/19/2018    Procedure: ESOPHAGOGASTRODUODENOSCOPY (EGD); Surgeon: Latoya Nath MD;  Location: [facility]; Service: Gastroenterology    ESOPHAGOGASTRODUODENOSCOPY N/A 5/10/2018    Procedure: ESOPHAGOGASTRODUODENOSCOPY (EGD); Surgeon: Usman Hartley MD;  Location: [facility];   Service: Gastroenterology    KNEE ARTHROSCOPY Bilateral     KNEE ARTHROSCOPY Right 2009    cibishino last assessed 08/24/2016    LIVER SURGERY      NERVE BLOCK Bilateral 5/29/2018    Procedure: SPLANCHNIC NERVE BLOCK;  Surgeon: Rafi Silverman MD;  [facility]: [facility];  Service: Pain Management     PANCREAS SURGERY      stents    HI INJECT NERV [address] Bilateral 5/9/2017    Procedure: CELIAC PLEXUS BLOCK ;  Surgeon: Rafi Silverman MD;  [facility];  Service: Pain Management     HI INJECT NERV BLCK,CELIAC PLEXUS Bilateral 6/1/2017    Procedure: SPLANCHNIC NERVE BLOCK at T12;  Surgeon: Rafi Silverman MD;  [facility];  Service: Pain Management     HI INJECT NERV [address] Bilateral 8/8/2017    Procedure: BILATERAL SPLANCHNIC NERVE BLOCK T12;  Surgeon: Carie Moreira Reymundo Apley, MD;  [facility];  Service: Pain Management     DC INJECT NERV BLCK,PARAVERT SYMPATH Bilateral 12/28/2017    Procedure: SPLANCHNIC NERVE BLOCK;  Surgeon: Jorge Musa MD;  [facility];  Service: Pain Management     DC LAP,CHOLECYSTECTOMY N/A 2/14/2017    Procedure: LAPAROSCOPIC CHOLECYSTECTOMY, IOC, POSSIBLE OPEN ;  Surgeon: Anneliese Arias MD;  [facility];  Service: General    ROTATOR CUFF REPAIR Right     SHOULDER ARTHROSCOPY      SHOULDER ARTHROSCOPY Right        Family History   Problem Relation Age of Onset    Cirrhosis Mother     Heart disease Other         cardiac disorder    Cancer Other      I have reviewed and agree with the history as documented  Social History   Substance Use Topics    Smoking status: Current Every Day Smoker     Packs/day: 0 50     Years: 20 00    Smokeless tobacco: Never Used    Alcohol use 0 6 oz/week     1 Cans of beer per week      Comment: rarely, occasionally, history of no alcohol use per allscripts        Review of Systems   Constitutional: Negative for activity change, appetite change, chills and fever  HENT: Negative for rhinorrhea and sore throat  Respiratory: Negative for cough, chest tightness and shortness of breath  Cardiovascular: Negative for chest pain and palpitations  Gastrointestinal: Negative for abdominal pain, diarrhea, nausea and vomiting  Endocrine: Negative for polydipsia and polyphagia  Genitourinary: Negative for dysuria, frequency and urgency  Musculoskeletal: Positive for joint swelling  Negative for arthralgias, back pain, gait problem and myalgias  Bilateral leg and foot pain   Skin: Negative for color change, pallor, rash and wound  Neurological: Negative for dizziness, weakness, light-headedness, numbness and headaches  Hematological: Negative for adenopathy  Does not bruise/bleed easily  Psychiatric/Behavioral: Negative for agitation and behavioral problems     All other systems reviewed and are negative  Physical Exam  Physical Exam   Constitutional: He is oriented to person, place, and time  He appears well-developed and well-nourished  No distress  Very well-appearing comfortable no acute distress, wife at bedside   HENT:   Head: Normocephalic and atraumatic  Eyes: Pupils are equal, round, and reactive to light  EOM are normal    Neck: Normal range of motion  Neck supple  No tracheal deviation present  Cardiovascular: Normal rate, regular rhythm and normal heart sounds  Exam reveals no gallop and no friction rub  No murmur heard  Pulmonary/Chest: Effort normal and breath sounds normal  He has no wheezes  He has no rales  Abdominal: Soft  Bowel sounds are normal  He exhibits no distension  There is no tenderness  There is no rebound and no guarding  Musculoskeletal:   Patient has 2+ symmetric popliteal dorsalis pedis and posterior tibial pulses his feet are well perfused cap refill is brisk sensations intact motor is intact, there are no overlying skin changes, no warmth erythema drainage, he has full active and passive range of motion of both ankle joints without significant discomfort there is trace pedal edema as well as edema over the lateral ankles however there is no unilateral leg swelling no calf pain or tenderness and no varicosities, he is tender over his left plantar arch and has a toe extension of the left foot only consistent with plantar fasciitis, there are no other acute ischemic infectious inflammatory traumatic findings on complete muscle skeletal exam   Neurological: He is alert and oriented to person, place, and time  No cranial nerve deficit  He exhibits normal muscle tone  Coordination normal    Skin: Skin is warm and dry  No rash noted  Psychiatric: He has a normal mood and affect  His behavior is normal    Nursing note and vitals reviewed        Vital Signs  ED Triage Vitals   Temperature Pulse Respirations Blood Pressure SpO2 10/06/18 1253 10/06/18 1252 10/06/18 1252 10/06/18 1252 10/06/18 1252   98 3 °F (36 8 °C) 74 16 121/93 97 %      Temp Source Heart Rate Source Patient Position - Orthostatic VS BP Location FiO2 (%)   10/06/18 1253 10/06/18 1252 10/06/18 1252 10/06/18 1252 --   Oral Monitor Sitting Right arm       Pain Score       10/06/18 1252       5           Vitals:    10/06/18 1252 10/06/18 1300   BP: 121/93 121/93   Pulse: 74    Patient Position - Orthostatic VS: Sitting        Visual Acuity      ED Medications  Medications   naproxen (NAPROSYN) tablet 500 mg (500 mg Oral Given 10/6/18 1342)   oxyCODONE-acetaminophen (PERCOCET) 5-325 mg per tablet 1 tablet (1 tablet Oral Given 10/6/18 1342)       Diagnostic Studies  Results Reviewed     Procedure Component Value Units Date/Time    B-type natriuretic peptide [[id number]]  (Normal) Collected:  10/06/18 1356    Lab Status:  Final result Specimen:  Blood from Hand, Right Updated:  10/06/18 1423     NT-proBNP 42 pg/mL     Comprehensive metabolic panel [[id number]] Collected:  10/06/18 1356    Lab Status:  Final result Specimen:  Blood from Hand, Right Updated:  10/06/18 1417     Sodium 137 mmol/L      Potassium 4 1 mmol/L      Chloride 102 mmol/L      CO2 27 mmol/L      ANION GAP 8 mmol/L      BUN 12 mg/dL      Creatinine 0 87 mg/dL      Glucose 105 mg/dL      Calcium 8 8 mg/dL      AST 30 U/L      ALT 37 U/L      Alkaline Phosphatase 93 U/L      Total Protein 7 0 g/dL      Albumin 4 0 g/dL      Total Bilirubin 0 30 mg/dL      eGFR 104 ml/min/1 73sq m     Narrative:         National Kidney Disease Education Program recommendations are as follows:  GFR calculation is accurate only with a steady state creatinine  Chronic Kidney disease less than 60 ml/min/1 73 sq  meters  Kidney failure less than 15 ml/min/1 73 sq  meters                   XR foot 3+ views LEFT   ED Interpretation by Ronda Hernandez DO (10/06 1400)   No definite bony abnormality      VAS lower limb venous duplex study, complete bilateral    (Results Pending)          Duplex resulted as negative bilaterally    Procedures  Procedures       Phone Contacts  ED Phone Contact    ED Course  ED Course as of Oct 06 1544   Sat Oct 06, 2018   1423 NT-proBNP: 42   1423 Creatinine: 0 87   1424 Albumin: 4 0   1506 Patient seen evaluated discharge understands, will profuse feed no DVT no other acute ischemic infectious inflammatory traumatic findings his hepatic function renal function and BNP are normal, patient wished to cut back from 3 to 2 tabs of lyrica a day, f/u with pcp this week,  agrees to discharge return follow-up instructions                                MDM  Number of Diagnoses or Management Options  Bilateral swelling of feet and ankles:   Pain in both lower extremities:   Plantar fasciitis of left foot:   Diagnosis management comments: 43-year-old male with a history of chronic pain, chronic pancreatitis here for evaluation of several days of bilateral leg pain and mild ankle swelling, he thought this may be related to his Lyrica, no other new medications no other complaints or concerns otherwise denies a complete 12 system review of systems, on exam here he is here with his wife he is afebrile normal vital signs is clinically very well appearing he is tender over his left plantar fascia he has trace edema symmetrically without calf pain or tenderness or unilateral leg swelling there are no other acute ischemic infectious inflammatory traumatic findings, given his multiple comorbidities, recent hospitalization, will check CMP, BNP, bilateral duplex with hospitalization last 30 days and new onset leg swelling although it is very mild, x-ray of his left foot has there was traumatic mechanism involved, although physical exam is most consistent with plantar fasciitis, if negative likely discharge with symptom control close return follow-up instructions early follow-up with primary care doctor, disposition pending further evaluation reassessment    CritCare Time    Disposition  Final diagnoses:   Plantar fasciitis of left foot   Bilateral swelling of feet and ankles   Pain in both lower extremities     Time reflects when diagnosis was documented in both MDM as applicable and the Disposition within this note     Time User Action Codes Description Comment    10/6/2018  3:06 PM Jaime Fernandez [M72 2] Plantar fasciitis of left foot     10/6/2018  3:06 PM Erasmo Pitcher Add [E08 810,  M25 476] Bilateral swelling of feet and ankles     10/6/2018  3:07 PM Erasmo Pitcher Add [J61 336,  M80 605] Pain in both lower extremities     10/6/2018  3:07 PM Alexander Snider [M72 2] Plantar fasciitis of left foot     10/6/2018  3:07 PM Erasmo Pitcher Modify [W92 941,  M25 476] Bilateral swelling of feet and ankles       ED Disposition     ED Disposition Condition Comment    Discharge  Pedrito Recinos discharge to home/self care      Condition at discharge: Stable        Follow-up Information     Follow up With Specialties Details Why Contact Info Additional Information    [address] [place] [address] Emergency Department Emergency Medicine  If symptoms worsen [facility] [address]  [phone] [facility], [address] [place], [place], [address] [facility], [address] [place] [address], Nurse Practitioner In 3 days  Idania Johnston [address]  [phone]             Discharge Medication List as of 10/6/2018  3:09 PM      START taking these medications    Details   HYDROcodone-acetaminophen (NORCO) 5-325 mg per tablet Take 1 tablet by mouth every 6 (six) hours as needed for pain for up to 3 days Max Daily Amount: 4 tablets, Starting Sat 10/6/2018, Until Tue 10/9/2018, Print      naproxen (NAPROSYN) 500 mg tablet Take 1 tablet (500 mg total) by mouth 2 (two) times a day with meals for 10 days, Starting Sat 10/6/2018, Until Tue 10/16/2018, Print         CONTINUE these medications which have NOT CHANGED    Details   acetaminophen (TYLENOL) 325 mg tablet Take 2 tablets (650 mg total) by mouth every 6 (six) hours as needed for fever, Starting Fri 5/11/2018, No Print      Choline Fenofibrate (FENOFIBRIC ACID) 135 MG CPDR Take 1 capsule by mouth daily, Starting Tue 9/6/2016, Historical Med      EPINEPHrine (EPIPEN 2-ARIEL) 0 3 mg/0 3 mL SOAJ EpiPen 2-Ariel 0 3 MG/0 3ML Injection Solution Auto-injector  INJECT 0 3ML INTRAMUSCULARLY AS DIRECTED  Quantity: 1;  Refills: 1      Toro Briceño ; Active  2 Solution Auto-injector Pen, Historical Med      FLAXSEED, LINSEED, PO Take 1,000 mg by mouth 3 (three) times a day, Until Discontinued, Historical Med      !! niacin (NIASPAN) 500 mg CR tablet TAKE ONE TABLET BY MOUTH EVERY DAY WITH BREAKFAST , Normal      !! niacin (NIASPAN) 500 mg CR tablet TAKE 1 TABLET DAILY WITH BREAKFAST, Normal      omega-3-acid ethyl esters (LOVAZA) 1 g capsule Take 2 capsules (2 g total) by mouth 2 (two) times a day for 90 days, Starting Mon 4/9/2018, Until Sun 7/8/2018, Normal      ondansetron (ZOFRAN-ODT) 4 mg disintegrating tablet DISSOLVE ONE TABLET IN MOUTH EVERY 8 HOURS AS NEEDED FOR NAUSEA AND VOMITING , Normal      oxyCODONE (ROXICODONE) 5 mg immediate release tablet Take 1 tablet (5 mg total) by mouth 3 (three) times a day as needed for moderate pain for up to 10 days Max Daily Amount: 15 mg, Starting Wed 9/26/2018, Until Sat 10/6/2018, Normal      !! pancrelipase, Lip-Prot-Amyl, (CREON) 12,000 units capsule Take 12,000 units of lipase by mouth 3 (three) times a day with meals  , Starting Thu 4/13/2017, Historical Med      !!  Pancrelipase, Lip-Prot-Amyl, (CREON) 66033 units CPEP Take 1 capsule (36,000 Units total) by mouth 3 (three) times a day before meals, Starting Fri 10/5/2018, Normal      pantoprazole (PROTONIX) 40 mg tablet TAKE 1 TABLET TWICE A DAY, Normal      pregabalin (LYRICA) 100 mg capsule Take 1 capsule (100 mg total) by mouth 3 (three) times a day for 30 days, Starting Wed 9/19/2018, Until Fri 10/19/2018, Print      ranitidine (ZANTAC) 150 MG capsule Take 150 mg by mouth 2 (two) times a day, Historical Med      rosuvastatin (CRESTOR) 40 MG tablet Take 1 tablet (40 mg total) by mouth daily, Starting Thu 8/9/2018, Normal      traZODone (DESYREL) 50 mg tablet Take 1 tablet (50 mg total) by mouth daily at bedtime, Starting Tue 5/1/2018, Normal      VENTOLIN  (90 Base) MCG/ACT inhaler INHALE 1 TO 2 PUFFS BY MOUTH EVERY 4 TO 6 HOURS as needed , Normal       !! - Potential duplicate medications found  Please discuss with provider  No discharge procedures on file      ED Provider  Electronically Signed by           Salomón Del Toro DO  10/06/18 0219

## 2018-10-08 ENCOUNTER — TELEPHONE (OUTPATIENT)
Dept: RADIOLOGY | Facility: CLINIC | Age: 43
End: 2018-10-08

## 2018-10-08 NOTE — TELEPHONE ENCOUNTER
----- Message from Yosef Diaz sent at 10/8/2018  9:36 AM EDT -----  Regarding: Non-Urgent Medical Question  Contact: [phone]  I am contacting you today in regards to a recent ER visit in which it was determined the lyrica I am taking is causing foot and ankle swelling  This conclusion was reached because all tests came back negative and the lyrica dosage was the only thing that has changed  It was suggested I go from taking it 3x a day to 2x and contact Dr Ninfa Essex asap  I am also experiencing a ruse in pain as I have ran out of the pain medication  If you could please advise me as to what to do it would be greatly appreciated as the swelling has not completely gone done and there has been a rise in my pain

## 2018-10-08 NOTE — TELEPHONE ENCOUNTER
S/w pt  States swelling in bl feet has decreased since reducing lyrica to 2x day but feet are still swollen  Pt is out of oxycodone at this time  Was given 12 tabs of hydrocodone in ED on 10/6  Pt states that is not touching pain and pain is increasing   Pt is scheduled for BL splanchnic nerve block 10/29  Please advise on recommendations at this time  Pt is aware that SI back in office tomorrow 10/9

## 2018-10-09 ENCOUNTER — TELEPHONE (OUTPATIENT)
Dept: PAIN MEDICINE | Facility: MEDICAL CENTER | Age: 43
End: 2018-10-09

## 2018-10-09 DIAGNOSIS — G89.4 CHRONIC PAIN SYNDROME: ICD-10-CM

## 2018-10-09 RX ORDER — OXYCODONE HYDROCHLORIDE 5 MG/1
5 TABLET ORAL 3 TIMES DAILY PRN
Qty: 90 TABLET | Refills: 0 | Status: SHIPPED | OUTPATIENT
Start: 2018-10-09 | End: 2018-11-08 | Stop reason: ALTCHOICE

## 2018-10-09 NOTE — TELEPHONE ENCOUNTER
S/w pharmacy regarding oxyCODONE (ROXICODONE) 5 mg immediate release tablet     Per pharmacist @ [facility],  he can only dispense 86 tablets not 90

## 2018-10-09 NOTE — TELEPHONE ENCOUNTER
Please advised patient that I will send a prescription for oxycodone 5 mg p o  t i d  (electronically) times 30 day supply until procedure is completed  In the meantime, he should continue with Lyrica 100 mg twice a day  Swelling should gradually subside  If swelling persists he should let us know

## 2018-10-10 ENCOUNTER — OFFICE VISIT (OUTPATIENT)
Dept: FAMILY MEDICINE CLINIC | Facility: CLINIC | Age: 43
End: 2018-10-10
Payer: COMMERCIAL

## 2018-10-10 VITALS
SYSTOLIC BLOOD PRESSURE: 138 MMHG | BODY MASS INDEX: 29.4 KG/M2 | DIASTOLIC BLOOD PRESSURE: 82 MMHG | RESPIRATION RATE: 17 BRPM | HEIGHT: 71 IN | OXYGEN SATURATION: 94 % | WEIGHT: 210 LBS | TEMPERATURE: 97.7 F | HEART RATE: 79 BPM

## 2018-10-10 DIAGNOSIS — R60.0 MILD PERIPHERAL EDEMA: Primary | ICD-10-CM

## 2018-10-10 PROCEDURE — 99213 OFFICE O/P EST LOW 20 MIN: CPT | Performed by: FAMILY MEDICINE

## 2018-10-10 NOTE — PROGRESS NOTES
Assessment/Plan:         Diagnoses and all orders for this visit:    Mild peripheral edema      discussed plan of care most likely secondary to medication, recommended follow-up plan of pain management, with decreasing dose of Lyrica, recommended low salt sodium diet elevation of extremities, call if any issues changes worsening        Subjective:      Patient ID: Evita Mandel is a 37 y o  male  Swelling to ankles   Was seen in the ER , and everyting was looked at , ekg , cxr kidneys and liver they did it all   This all started after the lyrica was doubled   was at lyrica 75 mg bid , then increased to 100mg tid  Did call the pain management md sakina, he did understand and has decreased the lyrica to 200mg qd     Denies any sob ,fermín , exercise intolerance , cp palpitations             The following portions of the patient's history were reviewed and updated as appropriate:   He has a past medical history of Asthma; Chronic pain disorder; GERD (gastroesophageal reflux disease); Hyperlipidemia; Liver abscess; Meniscus tear; Pancreatitis; and Pneumonia ,   does not have any pertinent problems on file  ,   has a past surgical history that includes Rotator cuff repair (Right); Pancreas surgery; Cholecystectomy; pr lap,cholecystectomy (N/A, 2/14/2017); Liver surgery; pr inject nerv blck,celiac plexus (Bilateral, 5/9/2017); pr inject nerv blck,celiac plexus (Bilateral, 6/1/2017); pr inject nerv blck,celiac plexus (Bilateral, 8/8/2017); Esophagogastroduodenoscopy (N/A, 11/16/2017); pr inject nerv blck,paravert sympath (Bilateral, 12/28/2017); Knee arthroscopy (Bilateral); Shoulder arthroscopy; Shoulder arthroscopy (Right); Esophagogastroduodenoscopy (N/A, 4/19/2018); Esophagogastroduodenoscopy (N/A, 5/10/2018); Knee arthroscopy (Right, 2009); and NERVE BLOCK (Bilateral, 5/29/2018)  ,  family history includes Cancer in his other; Cirrhosis in his mother; Heart disease in his other  ,   reports that he has been smoking    He has a 10 00 pack-year smoking history  He has never used smokeless tobacco  He reports that he drinks about 0 6 oz of alcohol per week   He reports that he does not use drugs  ,  is allergic to bee venom         Review of Systems   Constitutional: Negative for appetite change, chills, fever and unexpected weight change  HENT: Negative for congestion, dental problem, ear pain, hearing loss, postnasal drip, rhinorrhea, sinus pain, sinus pressure, sneezing, sore throat, tinnitus and voice change  Eyes: Negative for visual disturbance  Respiratory: Negative for apnea, cough, chest tightness and shortness of breath  Cardiovascular: Positive for leg swelling  Negative for chest pain and palpitations  Gastrointestinal: Negative for abdominal pain, blood in stool, constipation, diarrhea, nausea and vomiting  Endocrine: Negative for cold intolerance, heat intolerance, polydipsia, polyphagia and polyuria  Genitourinary: Negative for decreased urine volume, difficulty urinating, dysuria, frequency and hematuria  Musculoskeletal: Negative for arthralgias, back pain, gait problem, joint swelling and myalgias  Skin: Negative for color change, rash and wound  Allergic/Immunologic: Negative for environmental allergies and food allergies  Neurological: Negative for dizziness, syncope, weakness, light-headedness, numbness and headaches  Hematological: Negative for adenopathy  Does not bruise/bleed easily  Psychiatric/Behavioral: Negative for sleep disturbance and suicidal ideas  The patient is not nervous/anxious  Objective:  Vitals:    10/10/18 0938   BP: 138/82   Pulse: 79   Resp: 17   Temp: 97 7 °F (36 5 °C)   SpO2: 94%      Physical Exam   Constitutional: He is oriented to person, place, and time  He appears well-developed and well-nourished  No distress  HENT:   Head: Normocephalic and atraumatic     Right Ear: External ear normal    Left Ear: External ear normal    Eyes: Conjunctivae are normal  No scleral icterus  Neck: Normal range of motion  Neck supple  No thyromegaly present  Cardiovascular: Normal rate, regular rhythm and normal heart sounds  No murmur heard  Pulmonary/Chest: Effort normal and breath sounds normal    Musculoskeletal: Normal range of motion  Lymphadenopathy:     He has no cervical adenopathy  Neurological: He is alert and oriented to person, place, and time  Skin: Skin is warm and dry  Psychiatric: He has a normal mood and affect   His behavior is normal  Judgment and thought content normal

## 2018-10-10 NOTE — TELEPHONE ENCOUNTER
SERGIOI    S/w pharmacy  Pt picked up 86 tabs  Pharmacy did not have sufficient quantity to fill full script for 90 tabs

## 2018-10-12 DIAGNOSIS — J45.909 UNCOMPLICATED ASTHMA, UNSPECIFIED ASTHMA SEVERITY, UNSPECIFIED WHETHER PERSISTENT: ICD-10-CM

## 2018-10-12 RX ORDER — ALBUTEROL SULFATE 90 UG/1
2 AEROSOL, METERED RESPIRATORY (INHALATION) EVERY 6 HOURS PRN
Qty: 18 G | Refills: 2 | Status: SHIPPED | OUTPATIENT
Start: 2018-10-12 | End: 2019-01-10 | Stop reason: SDUPTHER

## 2018-10-12 NOTE — TELEPHONE ENCOUNTER
----- Message from Luis Angel Navarro sent at 10/12/2018 10:31 AM EDT -----  Regarding: FW: Prescription Question  Contact: [phone]      ----- Message -----  From: Pedrito Recinos  Sent: 10/12/2018   6:15 AM  To: [facility]  Subject: Prescription Question                            I am requesting if I can get a refill on my inhaler  [facility] had requested a renewal about a week ago and since that time I have been in for an office visit  I am about to run out of it and with the way the weather is I really need it now

## 2018-10-25 DIAGNOSIS — G47.00 INSOMNIA, UNSPECIFIED TYPE: ICD-10-CM

## 2018-10-26 RX ORDER — ONDANSETRON 4 MG/1
TABLET, ORALLY DISINTEGRATING ORAL
Qty: 20 TABLET | Refills: 0 | Status: SHIPPED | OUTPATIENT
Start: 2018-10-26 | End: 2019-01-10 | Stop reason: SDUPTHER

## 2018-10-28 ENCOUNTER — ANESTHESIA EVENT (OUTPATIENT)
Dept: PERIOP | Facility: HOSPITAL | Age: 43
End: 2018-10-28
Payer: COMMERCIAL

## 2018-10-29 ENCOUNTER — APPOINTMENT (OUTPATIENT)
Dept: RADIOLOGY | Facility: HOSPITAL | Age: 43
End: 2018-10-29
Payer: COMMERCIAL

## 2018-10-29 ENCOUNTER — HOSPITAL ENCOUNTER (OUTPATIENT)
Facility: HOSPITAL | Age: 43
Setting detail: OUTPATIENT SURGERY
Discharge: HOME | End: 2018-10-29
Attending: ANESTHESIOLOGY | Admitting: ANESTHESIOLOGY
Payer: COMMERCIAL

## 2018-10-29 ENCOUNTER — ANESTHESIA (OUTPATIENT)
Dept: PERIOP | Facility: HOSPITAL | Age: 43
End: 2018-10-29
Payer: COMMERCIAL

## 2018-10-29 VITALS
SYSTOLIC BLOOD PRESSURE: 136 MMHG | RESPIRATION RATE: 19 BRPM | HEART RATE: 73 BPM | HEIGHT: 71 IN | TEMPERATURE: 97.6 F | DIASTOLIC BLOOD PRESSURE: 81 MMHG | BODY MASS INDEX: 28.22 KG/M2 | WEIGHT: 201.6 LBS | OXYGEN SATURATION: 97 %

## 2018-10-29 PROCEDURE — 64530 N BLOCK INJ CELIAC PELUS: CPT | Performed by: ANESTHESIOLOGY

## 2018-10-29 PROCEDURE — 72070 X-RAY EXAM THORAC SPINE 2VWS: CPT

## 2018-10-29 RX ORDER — FENTANYL CITRATE 50 UG/ML
INJECTION, SOLUTION INTRAMUSCULAR; INTRAVENOUS AS NEEDED
Status: DISCONTINUED | OUTPATIENT
Start: 2018-10-29 | End: 2018-10-29 | Stop reason: SURG

## 2018-10-29 RX ORDER — SODIUM CHLORIDE, SODIUM LACTATE, POTASSIUM CHLORIDE, CALCIUM CHLORIDE 600; 310; 30; 20 MG/100ML; MG/100ML; MG/100ML; MG/100ML
125 INJECTION, SOLUTION INTRAVENOUS CONTINUOUS
Status: DISCONTINUED | OUTPATIENT
Start: 2018-10-29 | End: 2018-10-29 | Stop reason: HOSPADM

## 2018-10-29 RX ORDER — BUPIVACAINE HYDROCHLORIDE 2.5 MG/ML
INJECTION, SOLUTION INFILTRATION; PERINEURAL AS NEEDED
Status: DISCONTINUED | OUTPATIENT
Start: 2018-10-29 | End: 2018-10-29 | Stop reason: HOSPADM

## 2018-10-29 RX ORDER — PROPOFOL 10 MG/ML
INJECTION, EMULSION INTRAVENOUS AS NEEDED
Status: DISCONTINUED | OUTPATIENT
Start: 2018-10-29 | End: 2018-10-29 | Stop reason: SURG

## 2018-10-29 RX ORDER — DEXAMETHASONE SODIUM PHOSPHATE 10 MG/ML
INJECTION, SOLUTION INTRAMUSCULAR; INTRAVENOUS AS NEEDED
Status: DISCONTINUED | OUTPATIENT
Start: 2018-10-29 | End: 2018-10-29 | Stop reason: HOSPADM

## 2018-10-29 RX ORDER — LIDOCAINE HYDROCHLORIDE 20 MG/ML
INJECTION, SOLUTION INFILTRATION; PERINEURAL AS NEEDED
Status: DISCONTINUED | OUTPATIENT
Start: 2018-10-29 | End: 2018-10-29 | Stop reason: HOSPADM

## 2018-10-29 RX ORDER — FENTANYL CITRATE/PF 50 MCG/ML
50 SYRINGE (ML) INJECTION
Status: COMPLETED | OUTPATIENT
Start: 2018-10-29 | End: 2018-10-29

## 2018-10-29 RX ORDER — MIDAZOLAM HYDROCHLORIDE 1 MG/ML
INJECTION INTRAMUSCULAR; INTRAVENOUS AS NEEDED
Status: DISCONTINUED | OUTPATIENT
Start: 2018-10-29 | End: 2018-10-29 | Stop reason: SURG

## 2018-10-29 RX ORDER — PROPOFOL 10 MG/ML
INJECTION, EMULSION INTRAVENOUS CONTINUOUS PRN
Status: DISCONTINUED | OUTPATIENT
Start: 2018-10-29 | End: 2018-10-29 | Stop reason: SURG

## 2018-10-29 RX ADMIN — MIDAZOLAM HYDROCHLORIDE 2 MG: 1 INJECTION, SOLUTION INTRAMUSCULAR; INTRAVENOUS at 14:10

## 2018-10-29 RX ADMIN — PROPOFOL 20 MG: 10 INJECTION, EMULSION INTRAVENOUS at 14:21

## 2018-10-29 RX ADMIN — SODIUM CHLORIDE, SODIUM LACTATE, POTASSIUM CHLORIDE, AND CALCIUM CHLORIDE 125 ML/HR: .6; .31; .03; .02 INJECTION, SOLUTION INTRAVENOUS at 13:27

## 2018-10-29 RX ADMIN — FENTANYL CITRATE 50 MCG: 50 INJECTION INTRAMUSCULAR; INTRAVENOUS at 15:14

## 2018-10-29 RX ADMIN — PROPOFOL 100 MG: 10 INJECTION, EMULSION INTRAVENOUS at 14:19

## 2018-10-29 RX ADMIN — FENTANYL CITRATE 50 MCG: 50 INJECTION INTRAMUSCULAR; INTRAVENOUS at 14:57

## 2018-10-29 RX ADMIN — FENTANYL CITRATE 50 MCG: 50 INJECTION, SOLUTION INTRAMUSCULAR; INTRAVENOUS at 14:20

## 2018-10-29 RX ADMIN — FENTANYL CITRATE 50 MCG: 50 INJECTION INTRAMUSCULAR; INTRAVENOUS at 15:20

## 2018-10-29 RX ADMIN — FENTANYL CITRATE 50 MCG: 50 INJECTION, SOLUTION INTRAMUSCULAR; INTRAVENOUS at 14:10

## 2018-10-29 RX ADMIN — FENTANYL CITRATE 50 MCG: 50 INJECTION INTRAMUSCULAR; INTRAVENOUS at 15:05

## 2018-10-29 RX ADMIN — PROPOFOL 20 MG: 10 INJECTION, EMULSION INTRAVENOUS at 14:24

## 2018-10-29 RX ADMIN — PROPOFOL 100 MCG/KG/MIN: 10 INJECTION, EMULSION INTRAVENOUS at 14:21

## 2018-10-29 NOTE — ANESTHESIA POSTPROCEDURE EVALUATION
Post-Op Assessment Note      CV Status:  Stable    Mental Status:  Alert and awake    Hydration Status:  Euvolemic    PONV Controlled:  Controlled    Airway Patency:  Patent    Post Op Vitals Reviewed: Yes          Staff: CRNA           /81 (10/29/18 1445) 129/81   Temp 97 6 °F (36 4 °C) (10/29/18 1445)    Pulse 77 (10/29/18 1445)79   Resp 12 (10/29/18 1445) 18   SpO2   100%

## 2018-10-29 NOTE — ANESTHESIA PREPROCEDURE EVALUATION
Review of Systems/Medical History  Patient summary reviewed  Chart reviewed  No history of anesthetic complications     Cardiovascular  Exercise tolerance (METS): >4,  Hyperlipidemia, Dysrhythmias ,    Pulmonary  Pneumonia, Asthma ,        GI/Hepatic    GERD well controlled, Liver disease , Pancreatic problem,        Negative  ROS        Endo/Other  Negative endo/other ROS      GYN  Negative gynecology ROS          Hematology  Negative hematology ROS      Musculoskeletal  Negative musculoskeletal ROS        Neurology  Negative neurology ROS      Psychology   Negative psychology ROS              Physical Exam    Airway    Mallampati score: II  TM Distance: >3 FB  Neck ROM: full     Dental   No notable dental hx     Cardiovascular      Pulmonary      Other Findings        Anesthesia Plan  ASA Score- 3     Anesthesia Type- IV sedation with anesthesia with ASA Monitors  Additional Monitors:   Airway Plan:         Plan Factors-    Induction- intravenous  Postoperative Plan-     Informed Consent- Anesthetic plan and risks discussed with patient  I personally reviewed this patient with the CRNA  Discussed and agreed on the Anesthesia Plan with the CRNA  Niko Dover

## 2018-11-06 ENCOUNTER — APPOINTMENT (EMERGENCY)
Dept: CT IMAGING | Facility: HOSPITAL | Age: 43
End: 2018-11-06
Payer: COMMERCIAL

## 2018-11-06 ENCOUNTER — HOSPITAL ENCOUNTER (EMERGENCY)
Facility: HOSPITAL | Age: 43
Discharge: HOME | End: 2018-11-06
Attending: EMERGENCY MEDICINE | Admitting: EMERGENCY MEDICINE
Payer: COMMERCIAL

## 2018-11-06 VITALS
HEIGHT: 71 IN | HEART RATE: 81 BPM | DIASTOLIC BLOOD PRESSURE: 88 MMHG | WEIGHT: 199 LBS | RESPIRATION RATE: 16 BRPM | BODY MASS INDEX: 27.86 KG/M2 | TEMPERATURE: 97.9 F | OXYGEN SATURATION: 96 % | SYSTOLIC BLOOD PRESSURE: 135 MMHG

## 2018-11-06 DIAGNOSIS — E78.1 HYPERTRIGLYCERIDEMIA: ICD-10-CM

## 2018-11-06 DIAGNOSIS — E80.6 HYPERBILIRUBINEMIA: ICD-10-CM

## 2018-11-06 DIAGNOSIS — E86.0 DEHYDRATION: ICD-10-CM

## 2018-11-06 DIAGNOSIS — K29.80 DUODENITIS: ICD-10-CM

## 2018-11-06 DIAGNOSIS — R11.2 NAUSEA & VOMITING: Primary | ICD-10-CM

## 2018-11-06 LAB
ALBUMIN SERPL BCP-MCNC: 3.6 G/DL (ref 3.5–5)
ALP SERPL-CCNC: 109 U/L (ref 46–116)
ALT SERPL W P-5'-P-CCNC: 171 U/L (ref 12–78)
ANION GAP SERPL CALCULATED.3IONS-SCNC: 17 MMOL/L (ref 4–13)
AST SERPL W P-5'-P-CCNC: 258 U/L (ref 5–45)
BASOPHILS # BLD AUTO: 0.04 THOUSANDS/ΜL (ref 0–0.1)
BASOPHILS NFR BLD AUTO: 1 % (ref 0–1)
BILIRUB DIRECT SERPL-MCNC: 0.62 MG/DL (ref 0–0.2)
BILIRUB SERPL-MCNC: 2.9 MG/DL (ref 0.2–1)
BUN SERPL-MCNC: 17 MG/DL (ref 5–25)
CALCIUM SERPL-MCNC: 8.6 MG/DL (ref 8.3–10.1)
CHLORIDE SERPL-SCNC: 100 MMOL/L (ref 100–108)
CO2 SERPL-SCNC: 19 MMOL/L (ref 21–32)
CREAT SERPL-MCNC: 0.95 MG/DL (ref 0.6–1.3)
EOSINOPHIL # BLD AUTO: 0.06 THOUSAND/ΜL (ref 0–0.61)
EOSINOPHIL NFR BLD AUTO: 1 % (ref 0–6)
ERYTHROCYTE [DISTWIDTH] IN BLOOD BY AUTOMATED COUNT: 13.4 % (ref 11.6–15.1)
GFR SERPL CREATININE-BSD FRML MDRD: 96 ML/MIN/1.73SQ M
GLUCOSE SERPL-MCNC: 100 MG/DL (ref 65–140)
HCT VFR BLD AUTO: 52.4 % (ref 36.5–49.3)
HGB BLD-MCNC: 18 G/DL (ref 12–17)
IMM GRANULOCYTES # BLD AUTO: 0.03 THOUSAND/UL (ref 0–0.2)
IMM GRANULOCYTES NFR BLD AUTO: 1 % (ref 0–2)
LIPASE SERPL-CCNC: 123 U/L (ref 73–393)
LYMPHOCYTES # BLD AUTO: 1.94 THOUSANDS/ΜL (ref 0.6–4.47)
LYMPHOCYTES NFR BLD AUTO: 32 % (ref 14–44)
MCH RBC QN AUTO: 32 PG (ref 26.8–34.3)
MCHC RBC AUTO-ENTMCNC: 34.4 G/DL (ref 31.4–37.4)
MCV RBC AUTO: 93 FL (ref 82–98)
MONOCYTES # BLD AUTO: 0.7 THOUSAND/ΜL (ref 0.17–1.22)
MONOCYTES NFR BLD AUTO: 11 % (ref 4–12)
NEUTROPHILS # BLD AUTO: 3.38 THOUSANDS/ΜL (ref 1.85–7.62)
NEUTS SEG NFR BLD AUTO: 54 % (ref 43–75)
NRBC BLD AUTO-RTO: 0 /100 WBCS
PLATELET # BLD AUTO: 127 THOUSANDS/UL (ref 149–390)
PMV BLD AUTO: 12.4 FL (ref 8.9–12.7)
POTASSIUM SERPL-SCNC: 4.3 MMOL/L (ref 3.5–5.3)
PROT SERPL-MCNC: 6.9 G/DL (ref 6.4–8.2)
RBC # BLD AUTO: 5.62 MILLION/UL (ref 3.88–5.62)
SODIUM SERPL-SCNC: 136 MMOL/L (ref 136–145)
TRIGL SERPL-MCNC: 1233 MG/DL
WBC # BLD AUTO: 6.15 THOUSAND/UL (ref 4.31–10.16)

## 2018-11-06 PROCEDURE — 85025 COMPLETE CBC W/AUTO DIFF WBC: CPT | Performed by: EMERGENCY MEDICINE

## 2018-11-06 PROCEDURE — 96361 HYDRATE IV INFUSION ADD-ON: CPT

## 2018-11-06 PROCEDURE — 84478 ASSAY OF TRIGLYCERIDES: CPT | Performed by: EMERGENCY MEDICINE

## 2018-11-06 PROCEDURE — 96375 TX/PRO/DX INJ NEW DRUG ADDON: CPT

## 2018-11-06 PROCEDURE — 80048 BASIC METABOLIC PNL TOTAL CA: CPT | Performed by: EMERGENCY MEDICINE

## 2018-11-06 PROCEDURE — 99284 EMERGENCY DEPT VISIT MOD MDM: CPT

## 2018-11-06 PROCEDURE — 80076 HEPATIC FUNCTION PANEL: CPT | Performed by: EMERGENCY MEDICINE

## 2018-11-06 PROCEDURE — C9113 INJ PANTOPRAZOLE SODIUM, VIA: HCPCS | Performed by: EMERGENCY MEDICINE

## 2018-11-06 PROCEDURE — 83690 ASSAY OF LIPASE: CPT | Performed by: EMERGENCY MEDICINE

## 2018-11-06 PROCEDURE — 96374 THER/PROPH/DIAG INJ IV PUSH: CPT

## 2018-11-06 PROCEDURE — 36415 COLL VENOUS BLD VENIPUNCTURE: CPT | Performed by: EMERGENCY MEDICINE

## 2018-11-06 PROCEDURE — 74177 CT ABD & PELVIS W/CONTRAST: CPT

## 2018-11-06 RX ORDER — ONDANSETRON 4 MG/1
4 TABLET, ORALLY DISINTEGRATING ORAL EVERY 6 HOURS PRN
Qty: 20 TABLET | Refills: 0 | Status: SHIPPED | OUTPATIENT
Start: 2018-11-06 | End: 2018-11-12

## 2018-11-06 RX ORDER — ONDANSETRON 2 MG/ML
4 INJECTION INTRAMUSCULAR; INTRAVENOUS ONCE
Status: COMPLETED | OUTPATIENT
Start: 2018-11-06 | End: 2018-11-06

## 2018-11-06 RX ORDER — PANTOPRAZOLE SODIUM 40 MG/1
40 INJECTION, POWDER, FOR SOLUTION INTRAVENOUS ONCE
Status: COMPLETED | OUTPATIENT
Start: 2018-11-06 | End: 2018-11-06

## 2018-11-06 RX ADMIN — SODIUM CHLORIDE 1000 ML: 0.9 INJECTION, SOLUTION INTRAVENOUS at 16:19

## 2018-11-06 RX ADMIN — ONDANSETRON 4 MG: 2 INJECTION INTRAMUSCULAR; INTRAVENOUS at 16:19

## 2018-11-06 RX ADMIN — PANTOPRAZOLE SODIUM 40 MG: 40 INJECTION, POWDER, FOR SOLUTION INTRAVENOUS at 16:20

## 2018-11-06 RX ADMIN — IOHEXOL 100 ML: 350 INJECTION, SOLUTION INTRAVENOUS at 17:55

## 2018-11-06 NOTE — ED NOTES
Patient transported to [address] [place] [address], [facility]  11/06/18 9671

## 2018-11-06 NOTE — ED PROVIDER NOTES
History  Chief Complaint   Patient presents with    Abdominal Pain     Patient presents with adbominal pain for the last 2 days  +n/v/d Has history of Pancreatitis     HPI    Prior to Admission Medications   Prescriptions Last Dose Informant Patient Reported? Taking? Choline Fenofibrate (FENOFIBRIC ACID) 135 MG CPDR  Self Yes No   Sig: Take 1 capsule by mouth daily   EPINEPHrine (EPIPEN 2-ARIEL) 0 3 mg/0 3 mL SOAJ  Self Yes No   Sig: EpiPen 2-Ariel 0 3 MG/0 3ML Injection Solution Auto-injector  INJECT 0 3ML INTRAMUSCULARLY AS DIRECTED     Quantity: 1;  Refills: 1      Toro Briceño ; Active  2 Solution Auto-injector Pen   Pancrelipase, Lip-Prot-Amyl, (CREON) 88438 units CPEP   No No   Sig: Take 1 capsule (36,000 Units total) by mouth 3 (three) times a day before meals   albuterol (VENTOLIN HFA) 90 mcg/act inhaler   No No   Sig: Inhale 2 puffs every 6 (six) hours as needed for wheezing Prn   niacin (NIASPAN) 500 mg CR tablet   No No   Sig: TAKE ONE TABLET BY MOUTH EVERY DAY WITH BREAKFAST    niacin (NIASPAN) 500 mg CR tablet   No No   Sig: TAKE 1 TABLET DAILY WITH BREAKFAST   omega-3-acid ethyl esters (LOVAZA) 1 g capsule  Self No No   Sig: Take 2 capsules (2 g total) by mouth 2 (two) times a day for 90 days   ondansetron (ZOFRAN-ODT) 4 mg disintegrating tablet   No No   Sig: DISSOLVE ONE TABLET IN MOUTH EVERY EIGHT HOURS AS NEEDED NAUSEA AND VOMITING    oxyCODONE (ROXICODONE) 5 mg immediate release tablet   No No   Sig: Take 1 tablet (5 mg total) by mouth 3 (three) times a day as needed for moderate pain for up to 30 days Max Daily Amount: 15 mg   pancrelipase, Lip-Prot-Amyl, (CREON) 12,000 units capsule  Self Yes No   Sig: Take 12,000 units of lipase by mouth 3 (three) times a day with meals     pantoprazole (PROTONIX) 40 mg tablet   No No   Sig: TAKE 1 TABLET TWICE A DAY   pregabalin (LYRICA) 100 mg capsule   No No   Sig: Take 1 capsule (100 mg total) by mouth 3 (three) times a day for 30 days   pregabalin (LYRICA) 75 mg capsule   No No   Sig: Take 1 capsule (75 mg total) by mouth 3 (three) times a day for 30 days   Patient taking differently: Take 150 mg by mouth 2 (two) times a day     ranitidine (ZANTAC) 150 MG capsule   Yes No   Sig: Take 150 mg by mouth 2 (two) times a day   rosuvastatin (CRESTOR) 40 MG tablet   No No   Sig: Take 1 tablet (40 mg total) by mouth daily   traZODone (DESYREL) 50 mg tablet   No No   Sig: Take 1 tablet (50 mg total) by mouth daily at bedtime   Patient taking differently: Take by mouth daily at bedtime        Facility-Administered Medications: None       Past Medical History:   Diagnosis Date    Asthma     Chronic pain disorder     GERD (gastroesophageal reflux disease)     Hyperlipidemia     Liver abscess     Meniscus tear     left knee work injury last assessed 08/24/2016    Pancreatitis     Pneumonia        Past Surgical History:   Procedure Laterality Date    CELIAC PLEXUS BLOCK Bilateral 10/29/2018    Procedure: SPLANCHNIC NERVE BLOCK;  Surgeon: Jl Esteves MD;  [facility];  Service: Pain Management     CHOLECYSTECTOMY      ESOPHAGOGASTRODUODENOSCOPY N/A 11/16/2017    Procedure: ESOPHAGOGASTRODUODENOSCOPY (EGD); Surgeon: Leopold Ewings, MD;  [facility]; Service: Gastroenterology    ESOPHAGOGASTRODUODENOSCOPY N/A 4/19/2018    Procedure: ESOPHAGOGASTRODUODENOSCOPY (EGD); Surgeon: Demetris Zepeda MD;  [facility]; Service: Gastroenterology    ESOPHAGOGASTRODUODENOSCOPY N/A 5/10/2018    Procedure: ESOPHAGOGASTRODUODENOSCOPY (EGD); Surgeon: Wanda Brunner MD;  [facility];   Service: Gastroenterology    KNEE ARTHROSCOPY Bilateral     KNEE ARTHROSCOPY Right 2009    cibishino last assessed 08/24/2016    LIVER SURGERY      NERVE BLOCK Bilateral 5/29/2018    Procedure: SPLANCHNIC NERVE BLOCK;  Surgeon: Jl Esteves MD;  [facility];  Service: Pain Management     PANCREAS SURGERY      stents    [place] INJECT NERV [address]  PLEXUS Bilateral 5/9/2017    Procedure: CELIAC PLEXUS BLOCK ;  Surgeon: Florence Flynn MD;  [facility];  Service: Pain Management     [place] INJECT NERV BLCK,CELIAC PLEXUS Bilateral 6/1/2017    Procedure: SPLANCHNIC NERVE BLOCK at T12;  Surgeon: Florence Flynn MD;  [facility];  Service: Pain Management     [place] INJECT NERV BLCK,CELIAC PLEXUS Bilateral 8/8/2017    Procedure: BILATERAL SPLANCHNIC NERVE BLOCK T12;  Surgeon: Florence Flynn MD;  [facility];  Service: Pain Management     [place] INJECT NERV Kay Good Bilateral 12/28/2017    Procedure: SPLANCHNIC NERVE BLOCK;  Surgeon: Florence Flynn MD;  [facility];  Service: Pain Management     [place] LAP,CHOLECYSTECTOMY N/A 2/14/2017    Procedure: LAPAROSCOPIC CHOLECYSTECTOMY, IOC, POSSIBLE OPEN ;  Surgeon: Prashanth Pearson MD;  [facility];  Service: General    ROTATOR CUFF REPAIR Right     SHOULDER ARTHROSCOPY      SHOULDER ARTHROSCOPY Right        Family History   Problem Relation Age of Onset    Cirrhosis Mother     Heart disease Other         cardiac disorder    Cancer Other      I have reviewed and agree with the history as documented  Social History   Substance Use Topics    Smoking status: Current Every Day Smoker     Packs/day: 0 50     Years: 20 00    Smokeless tobacco: Never Used    Alcohol use 0 6 oz/week     1 Cans of beer per week      Comment: rarely, occasionally, history of no alcohol use per allscripts        Review of Systems    Physical Exam  Physical Exam   Constitutional: He is oriented to person, place, and time  He appears well-developed and well-nourished  No distress  HENT:   Head: Normocephalic and atraumatic  Mouth/Throat: Oropharynx is clear and moist    Eyes: Pupils are equal, round, and reactive to light  Conjunctivae are normal    Neck: Normal range of motion  No tracheal deviation present     Cardiovascular: Normal rate, regular rhythm, normal heart sounds and intact distal pulses  Pulmonary/Chest: Effort normal and breath sounds normal  No respiratory distress  Abdominal: Soft  Bowel sounds are normal  He exhibits no distension  There is tenderness (Very mild) in the right upper quadrant, epigastric area and left upper quadrant  There is no rigidity, no rebound and no guarding  Neurological: He is alert and oriented to person, place, and time  GCS eye subscore is 4  GCS verbal subscore is 5  GCS motor subscore is 6  Skin: Skin is warm and dry  Psychiatric: He has a normal mood and affect  His behavior is normal    Nursing note and vitals reviewed        Vital Signs  ED Triage Vitals [11/06/18 1436]   Temperature Pulse Respirations Blood Pressure SpO2   97 9 °F (36 6 °C) 94 18 (!) 168/106 98 %      Temp Source Heart Rate Source Patient Position - Orthostatic VS BP Location FiO2 (%)   Oral Monitor Sitting Left arm --      Pain Score       5           Vitals:    11/06/18 1436 11/06/18 1815   BP: (!) 168/106 135/88   Pulse: 94 81   Patient Position - Orthostatic VS: Sitting Lying       Visual Acuity      ED Medications  Medications   sodium chloride 0 9 % bolus 1,000 mL (0 mL Intravenous Stopped 11/6/18 1927)   ondansetron (ZOFRAN) injection 4 mg (4 mg Intravenous Given 11/6/18 1619)   pantoprazole (PROTONIX) injection 40 mg (40 mg Intravenous Given 11/6/18 1620)   iohexol (OMNIPAQUE) 350 MG/ML injection (MULTI-DOSE) 100 mL (100 mL Intravenous Given 11/6/18 1755)       Diagnostic Studies  Results Reviewed     Procedure Component Value Units Date/Time    Basic metabolic panel [[id number]]  (Abnormal) Collected:  11/06/18 1712    Lab Status:  Final result Specimen:  Blood from Arm, Right Updated:  11/06/18 1740     Sodium 136 mmol/L      Potassium 4 3 mmol/L      Chloride 100 mmol/L      CO2 19 (L) mmol/L      ANION GAP 17 (H) mmol/L      BUN 17 mg/dL      Creatinine 0 95 mg/dL      Glucose 100 mg/dL      Calcium 8 6 mg/dL      eGFR 96 ml/min/1 73sq m     Narrative:         Consolidated [place] [facility] Disease Education Program recommendations are as follows:  GFR calculation is accurate only with a steady state creatinine  Chronic Kidney disease less than 60 ml/min/1 73 sq  meters  Kidney failure less than 15 ml/min/1 73 sq  meters      Lipase [[id number]]  (Normal) Collected:  11/06/18 1712    Lab Status:  Final result Specimen:  Blood from Arm, Right Updated:  11/06/18 1740     Lipase 123 u/L     Hepatic function panel [[id number]]  (Abnormal) Collected:  11/06/18 1712    Lab Status:  Final result Specimen:  Blood from Arm, Right Updated:  11/06/18 1740     Total Bilirubin 2 90 (H) mg/dL      Bilirubin, Direct 0 62 (H) mg/dL      Alkaline Phosphatase 109 U/L       (H) U/L       (H) U/L      Total Protein 6 9 g/dL      Albumin 3 6 g/dL     Triglycerides [[id number]]  (Abnormal) Collected:  11/06/18 1712    Lab Status:  Final result Specimen:  Blood from Arm, Right Updated:  11/06/18 1740     Triglycerides 1,233 (H) mg/dL     CBC and differential [[id number]]  (Abnormal) Collected:  11/06/18 1559    Lab Status:  Final result Specimen:  Blood from Arm, Right Updated:  11/06/18 1611     WBC 6 15 Thousand/uL      RBC 5 62 Million/uL      Hemoglobin 18 0 (H) g/dL      Hematocrit 52 4 (H) %      MCV 93 fL      MCH 32 0 pg      MCHC 34 4 g/dL      RDW 13 4 %      MPV 12 4 fL      Platelets 721 (L) Thousands/uL      nRBC 0 /100 WBCs      Neutrophils Relative 54 %      Immat GRANS % 1 %      Lymphocytes Relative 32 %      Monocytes Relative 11 %      Eosinophils Relative 1 %      Basophils Relative 1 %      Neutrophils Absolute 3 38 Thousands/µL      Immature Grans Absolute 0 03 Thousand/uL      Lymphocytes Absolute 1 94 Thousands/µL      Monocytes Absolute 0 70 Thousand/µL      Eosinophils Absolute 0 06 Thousand/µL      Basophils Absolute 0 04 Thousands/µL                  CT abdomen pelvis with contrast   Final Result by Yamile Jeffries MD (11/06 1816)      Mild diffuse thickening of the proximal duodenum with some surrounding fat stranding in keeping with a nonspecific duodenitis  Ulcer disease is part of the differential diagnosis  No definite pancreatitis  Workstation performed: [id number]                    Procedures  Procedures       Phone Contacts  ED Phone Contact    ED Course                               MDM  Number of Diagnoses or Management Options  Dehydration: new and requires workup  Duodenitis: new and requires workup  Hyperbilirubinemia: new and requires workup  Hypertriglyceridemia:   Nausea & vomiting: new and requires workup  Diagnosis management comments: This is a 40-year-old male who presents here today with nausea, vomiting, and diarrhea  He states this has been going on for about two days, initially vomiting about four to 5 times a day, but now just dry heaving  He states he is still having a hard time tolerating liquids because of the nausea and vomiting  He has taken his prescribed ODT Zofran which is not helping his symptoms  He has had about 4-5 episodes of diarrhea a day  He did take a dose of Pepto-Bismol which did not help  He did have a "twinge" of epigastric abdominal pain  He states he had a splenic nerve block last week so has significantly decreased ability to feel pain, and is uncertain of whether the pain represents acute problems such as recurrent pancreatitis or is just due to his repetitive vomiting  He denies any fevers, URI symptoms, urinary complaints, known sick contacts or bad food exposures  He has a history of recurrent pancreatitis due to hypertriglyceridemia, and on last admission in September required plasmapheresis far his triglycerides  He states he has been taking his medications as prescribed, but despite doing so continues to have recurrent problems  He did have a cholecystectomy and denies any other abdominal surgeries  He denies any complications secondary to the nerve block from last week      ROS: Otherwise negative, unless stated as above  He is well-appearing, in no acute distress  He has very mild upper abdominal tenderness to palpation without peritoneal signs  The remainder of his exam is unremarkable  Differential includes viral GI illness versus food poisoning, versus recurrent episode of pancreatitis  We will check lab work and CT scan to evaluate  The patient's bilirubin today is 2 90  He has had hyperbilirubinemia previously, but the previous high value was 1 9  His AST and ALT are mildly elevated to [address], and had previously been elevated far more significantly than this  His lipase is normal today, but his triglycerides are again significantly elevated  Does have a mild anion gap was slightly low CO2, though normal electrolytes, which likely reflects his recent persistent vomiting and dehydration  His hemoglobin is elevated, which likely reflects hemoconcentration  He does have a thrombocytopenia, which she has had multiple times previously, though it is better than his more recent values  His CT scan today shows findings consistent with duodenitis, but no definitive pancreatitis  He does have a history of gastritis, so this does raise concern for peptic ulcer disease  Patient feels much better after medications here  He is able to tolerate fluids without recurrent vomiting  I discussed with him the options of admission as observation for monitoring of his LFTs as they are up again without a definitive etiology, verses discharge home for outpatient management of his symptoms, and return for worsening of them  His wife states that she made a follow-up appointment with his doctor in two days for follow-up while he was waiting here  The patient and his wife elected to be discharged home  He was advised to return for worsening or changing symptoms  The patient and his wife expressed understanding with this plan         Amount and/or Complexity of Data Reviewed  Clinical lab tests: reviewed and ordered  Tests in the radiology section of CPT®: reviewed and ordered  Decide to obtain previous medical records or to obtain history from someone other than the patient: yes  Review and summarize past medical records: yes  Independent visualization of images, tracings, or specimens: yes      CritCare Time    Disposition  Final diagnoses:   Nausea & vomiting   Duodenitis   Hyperbilirubinemia   Hypertriglyceridemia   Dehydration     Time reflects when diagnosis was documented in both MDM as applicable and the Disposition within this note     Time User Action Codes Description Comment    11/6/2018  6:49 PM Yue-TesDaniel vizcarra Och Add [R11 2] Nausea & vomiting     11/6/2018  6:49 PM Tunrer-TesDaniel vizcarra Och Add [K29 80] Duodenitis     11/6/2018  6:49 PM Yue-TesDaniel vizcarra Och Add [E80 6] Hyperbilirubinemia     11/6/2018  6:49 PM Turner-Teszackery Daniel Och Add [E78 1] Hypertriglyceridemia     11/6/2018  6:49 PM Turner-TesDaniel vizcarra Och Add [E86 0] Dehydration       ED Disposition     ED Disposition Condition Comment    Discharge  Yolis Clemens discharge to home/self care      Condition at discharge: Good        Follow-up Information     Follow up With Specialties Details Why Contact Info Additional Information    Meka Chu, [address] Lanza, Nurse Practitioner Go in 2 days as scheduled Pawan Tran  [address]  [facility] Schedule an appointment as soon as possible for a visit to follow up on your duodenitis and liver enzymes [address]  [address]  [address] [facility] [address] [phone] [facility] [address]  [address] [place], [place], [address]          Discharge Medication List as of 11/6/2018  7:20 PM      START taking these medications    Details   !! ondansetron (ZOFRAN-ODT) 4 mg disintegrating tablet Take 1 tablet (4 mg total) by mouth every 6 (six) hours as needed for nausea or vomiting, Starting Tue 11/6/2018, Print       !! - Potential duplicate medications found  Please discuss with provider  CONTINUE these medications which have NOT CHANGED    Details   albuterol (VENTOLIN HFA) 90 mcg/act inhaler Inhale 2 puffs every 6 (six) hours as needed for wheezing Prn, Starting Fri 10/12/2018, Until Sat 10/12/2019, Normal      Choline Fenofibrate (FENOFIBRIC ACID) 135 MG CPDR Take 1 capsule by mouth daily, Starting Tue 9/6/2016, Historical Med      EPINEPHrine (EPIPEN 2-ARIEL) 0 3 mg/0 3 mL SOAJ EpiPen 2-Ariel 0 3 MG/0 3ML Injection Solution Auto-injector  INJECT 0 3ML INTRAMUSCULARLY AS DIRECTED  Quantity: 1;  Refills: 1      Toro Briceño ; Active  2 Solution Auto-injector Pen, Historical Med      !! niacin (NIASPAN) 500 mg CR tablet TAKE ONE TABLET BY MOUTH EVERY DAY WITH BREAKFAST , Normal      !! niacin (NIASPAN) 500 mg CR tablet TAKE 1 TABLET DAILY WITH BREAKFAST, Normal      omega-3-acid ethyl esters (LOVAZA) 1 g capsule Take 2 capsules (2 g total) by mouth 2 (two) times a day for 90 days, Starting Mon 4/9/2018, Until Mon 10/29/2018, Normal      !! ondansetron (ZOFRAN-ODT) 4 mg disintegrating tablet DISSOLVE ONE TABLET IN MOUTH EVERY EIGHT HOURS AS NEEDED NAUSEA AND VOMITING , Normal      oxyCODONE (ROXICODONE) 5 mg immediate release tablet Take 1 tablet (5 mg total) by mouth 3 (three) times a day as needed for moderate pain for up to 30 days Max Daily Amount: 15 mg, Starting Tue 10/9/2018, Until Thu 11/8/2018, Normal      !! pancrelipase, Lip-Prot-Amyl, (CREON) 12,000 units capsule Take 12,000 units of lipase by mouth 3 (three) times a day with meals  , Starting Thu 4/13/2017, Historical Med      !!  Pancrelipase, Lip-Prot-Amyl, (CREON) 38522 units CPEP Take 1 capsule (36,000 Units total) by mouth 3 (three) times a day before meals, Starting Fri 10/5/2018, Normal      pantoprazole (PROTONIX) 40 mg tablet TAKE 1 TABLET TWICE A DAY, Normal      pregabalin (LYRICA) 100 mg capsule Take 1 capsule (100 mg total) by mouth 3 (three) times a day for 30 days, Starting Wed 9/19/2018, Until Mon 10/29/2018, Print      pregabalin (LYRICA) 75 mg capsule Take 1 capsule (75 mg total) by mouth 3 (three) times a day for 30 days, Starting Thu 7/26/2018, Until Mon 10/29/2018, Print      ranitidine (ZANTAC) 150 MG capsule Take 150 mg by mouth 2 (two) times a day, Historical Med      rosuvastatin (CRESTOR) 40 MG tablet Take 1 tablet (40 mg total) by mouth daily, Starting Thu 8/9/2018, Normal      traZODone (DESYREL) 50 mg tablet Take 1 tablet (50 mg total) by mouth daily at bedtime, Starting Tue 5/1/2018, Normal       !! - Potential duplicate medications found  Please discuss with provider  No discharge procedures on file      ED Provider  Electronically Signed by           Angelita Anne MD  11/06/18 4561

## 2018-11-06 NOTE — ED NOTES
MD aware tourniquet was found left on arm since at least 1712        Mike Carrasco RN  11/06/18 9675

## 2018-11-07 NOTE — DISCHARGE INSTRUCTIONS
Take your medications as prescribed  Follow up with your primary care doctor on Thursday as scheduled, and you should have your liver enzymes repeated at this visit  Follow up with your GI doctor for further evaluation of your duodenitis and liver enzymes  Return for worsening or changing symptoms, persistent vomiting and inability to tolerate fluids despite the medications, severe pain, development of fevers, or for any other concerns  Acute Nausea and Vomiting, Ambulatory Care   GENERAL INFORMATION:   Acute nausea and vomiting  starts suddenly, gets worse quickly, and lasts a short time  Nausea and vomiting may be caused by pregnancy, alcohol, infection, or medicines  Common related symptoms include the following:   · Fever    · Abdominal swelling    · Pain, tenderness, or a lump in the abdomen    · Splashing sounds heard in your stomach when you move  Seek immediate care for the following symptoms:   · Blood in your vomit or bowel movements    · Sudden, severe pain in your chest and upper abdomen after hard vomiting    · Dizziness, dry mouth, and thirst    · Urinating very little or not at all    · Muscle weakness, leg cramps, and trouble breathing    · A heart beat that is faster than normal    · Vomiting for more than 48 hours  Treatment for acute nausea and vomiting  may include medicines to calm your stomach and stop the vomiting  You may need IV fluids if you are dehydrated  Manage your nausea and vomiting:   · Drink liquids as directed to prevent dehydration  Ask how much liquid to drink each day and which liquids are best for you  You may need to drink an oral rehydration solution (ORS)  ORS contains water, salts, and sugar that are needed to replace the lost body fluids  Ask what kind of ORS to use, how much to drink, and where to get it  · Eat smaller meals, more often  Eat small amounts of food every 2 to 3 hours, even if you are not hungry   Food in your stomach may help decrease your nausea  · Avoid stress  Find ways to relax and manage your stress  Headaches due to stress may cause nausea and vomiting  Get more rest and sleep  Follow up with your healthcare provider as directed:  Write down your questions so you remember to ask them during your visits  CARE AGREEMENT:   You have the right to help plan your care  Learn about your health condition and how it may be treated  Discuss treatment options with your caregivers to decide what care you want to receive  You always have the right to refuse treatment  The above information is an  only  It is not intended as medical advice for individual conditions or treatments  Talk to your doctor, nurse or pharmacist before following any medical regimen to see if it is safe and effective for you  © 2014 [address] is for End User's use only and may not be sold, redistributed or otherwise used for commercial purposes  All illustrations and images included in CareNotes® are the copyrighted property of A D A M [employer]  or [employer]  Duodenitis   WHAT YOU NEED TO KNOW:   Duodenitis is inflammation or irritation of the duodenum  The duodenum is the first part of the small intestine, just below your stomach  DISCHARGE INSTRUCTIONS:   Return to the emergency department if:   · You have severe abdominal pain  · You have bloody or black, tarry bowel movements or vomit  Contact your healthcare provider if:   · You have new or worsening symptoms, even after treatment  · You have questions or concerns about your condition or care  Medicines:   · Medicines  may be given to treat an infection or to control stomach acid  · Take your medicine as directed  Contact your healthcare provider if you think your medicine is not helping or if you have side effects  Tell him of her if you are allergic to any medicine  Keep a list of the medicines, vitamins, and herbs you take   Include the amounts, and when and why you take them  Bring the list or the pill bottles to follow-up visits  Carry your medicine list with you in case of an emergency  Follow up with your healthcare provider as directed: You may need ongoing tests or treatment  Write down your questions so you remember to ask them during your visits  Do not smoke:  Nicotine and other chemicals in cigarettes and cigars can cause blood vessel and lung damage  Ask your healthcare provider for information if you currently smoke and need help to quit  E-cigarettes or smokeless tobacco still contain nicotine  Talk to your healthcare provider before you use these products  Limit or do not drink alcohol:  Alcohol can make your duodenitis worse  Talk to your healthcare provider if you need help to stop drinking  Keep batteries and similar objects out of the reach of children:  Babies often put items in their mouths to explore them  Button batteries are easy to swallow and can cause serious damage  Keep the battery covers of electronic devices such as remote controls taped closed  Store all batteries and toxic materials where children cannot get to them  Use childproof locks to keep children away from dangerous materials  Manage or prevent duodenitis:   · Do not take NSAIDs or aspirin unless directed  These and similar medicines can cause irritation  It may help to take NSAIDs with food, but you may not be able to take them at all  · Do not eat foods that cause irritation  Foods such as oranges and salsa can cause burning or pain  Eat a variety of healthy foods  Examples include fruits (not citrus), vegetables, low-fat dairy products, beans, whole-grain breads, and lean meats and fish  Try to eat small meals, and drink water with your meals  Do not eat for at least 3 hours before you go to bed    © 2017 [employer] Information is for End User's use only and may not be sold, redistributed or otherwise used for commercial purposes  All illustrations and images included in CareNotes® are the copyrighted property of A D A M , [employer]  or [employer]  The above information is an  only  It is not intended as medical advice for individual conditions or treatments  Talk to your doctor, nurse or pharmacist before following any medical regimen to see if it is safe and effective for you

## 2018-11-08 ENCOUNTER — OFFICE VISIT (OUTPATIENT)
Dept: FAMILY MEDICINE CLINIC | Facility: CLINIC | Age: 43
End: 2018-11-08
Payer: COMMERCIAL

## 2018-11-08 VITALS
DIASTOLIC BLOOD PRESSURE: 80 MMHG | BODY MASS INDEX: 29.92 KG/M2 | SYSTOLIC BLOOD PRESSURE: 144 MMHG | HEART RATE: 101 BPM | OXYGEN SATURATION: 97 % | HEIGHT: 70 IN | TEMPERATURE: 97.7 F | WEIGHT: 209 LBS

## 2018-11-08 DIAGNOSIS — K86.1 ACUTE ON CHRONIC PANCREATITIS (HCC): Primary | ICD-10-CM

## 2018-11-08 DIAGNOSIS — Z23 NEED FOR INFLUENZA VACCINATION: ICD-10-CM

## 2018-11-08 DIAGNOSIS — E78.1 HYPERTRIGLYCERIDEMIA: ICD-10-CM

## 2018-11-08 DIAGNOSIS — K85.90 ACUTE ON CHRONIC PANCREATITIS (HCC): Primary | ICD-10-CM

## 2018-11-08 DIAGNOSIS — G89.4 CHRONIC PAIN SYNDROME: ICD-10-CM

## 2018-11-08 DIAGNOSIS — E78.2 MIXED HYPERLIPIDEMIA: ICD-10-CM

## 2018-11-08 PROCEDURE — 99214 OFFICE O/P EST MOD 30 MIN: CPT | Performed by: FAMILY MEDICINE

## 2018-11-08 PROCEDURE — 90686 IIV4 VACC NO PRSV 0.5 ML IM: CPT

## 2018-11-08 PROCEDURE — 90471 IMMUNIZATION ADMIN: CPT

## 2018-11-08 RX ORDER — OMEGA-3-ACID ETHYL ESTERS 1 G/1
CAPSULE, LIQUID FILLED ORAL
Qty: 360 CAPSULE | Refills: 1 | Status: SHIPPED | OUTPATIENT
Start: 2018-11-08 | End: 2019-01-10 | Stop reason: SDUPTHER

## 2018-11-08 NOTE — PROGRESS NOTES
Assessment/Plan:         Diagnoses and all orders for this visit:    Acute on chronic pancreatitis ([facility] [address] )  -     Comprehensive metabolic panel; Future  -     CBC and differential; Future    Need for influenza vaccination  -     SYRINGE/SINGLE-DOSE VIAL: influenza vaccine, [id number], quadrivalent, 0 5 mL, preservative-free, for patients 3+ yr (FLUZONE)    Chronic pain syndrome    Hypertriglyceridemia        Discussed plan care reviewed ER recommendations, to continue current medications maintain follow-up GI, will repeat CMP, continue to advance diet as tolerated call for any changes worsening  Chronic pain syndrome  Stable currently under care of pain management  Hypertriglyceridemia  Stable continue current medications    Subjective:      Patient ID: Lonnie Barrera is a 37 y o  male  F/u ER   Wile in [facility], [place] started with abd pain , did not go to ER   Pain sharp , prior to that was having nausea , gagging , dry heaving   Thought related to the nerve block which I received days prior   Was in [facility] , wife was also ill at the time , similar complaints but to a lesser degree   felt nauseous was dry heaving with some abdominal pain, denies any fevers chills specific pain to back  Since last seen in the emergency room has been feeling somewhat better, appetite improving  Chicken pot pie for lunch today having no issues concerns bowel habits returned to normal  I do of scheduled appointment with Dr Radha Fritz next Thursday  Chronic pain under care Dr Alicia Reeves real changes some improvement, no change in medications  Cholesterol continues to take medications for cholesterol no changes        The following portions of the patient's history were reviewed and updated as appropriate:   He has a past medical history of Asthma; Chronic pain disorder; GERD (gastroesophageal reflux disease); Hyperlipidemia; Liver abscess; Meniscus tear; Pancreatitis; and Pneumonia ,   does not have any pertinent problems on file  , has a past surgical history that includes Rotator cuff repair (Right); Pancreas surgery; Cholecystectomy; pr lap,cholecystectomy (N/A, 2/14/2017); Liver surgery; pr inject nerv blck,celiac plexus (Bilateral, 5/9/2017); pr inject nerv blck,celiac plexus (Bilateral, 6/1/2017); pr inject nerv blck,celiac plexus (Bilateral, 8/8/2017); Esophagogastroduodenoscopy (N/A, 11/16/2017); pr inject nerv blck,paravert sympath (Bilateral, 12/28/2017); Knee arthroscopy (Bilateral); Shoulder arthroscopy; Shoulder arthroscopy (Right); Esophagogastroduodenoscopy (N/A, 4/19/2018); Esophagogastroduodenoscopy (N/A, 5/10/2018); Knee arthroscopy (Right, 2009); NERVE BLOCK (Bilateral, 5/29/2018); and Celiac plexus block (Bilateral, 10/29/2018)  ,  family history includes Cancer in his other; Cirrhosis in his mother; Heart disease in his other  ,   reports that he has been smoking  He has a 10 00 pack-year smoking history  He has never used smokeless tobacco  He reports that he drinks about 0 6 oz of alcohol per week   He reports that he does not use drugs  ,  is allergic to bee venom         Review of Systems   Constitutional: Negative for appetite change, chills, fever and unexpected weight change  HENT: Negative for congestion, dental problem, ear pain, hearing loss, postnasal drip, rhinorrhea, sinus pain, sinus pressure, sneezing, sore throat, tinnitus and voice change  Eyes: Negative for visual disturbance  Respiratory: Negative for apnea, cough, chest tightness and shortness of breath  Cardiovascular: Negative for chest pain, palpitations and leg swelling  Gastrointestinal: Negative for abdominal pain, blood in stool, constipation, diarrhea, nausea and vomiting  Endocrine: Negative for cold intolerance, heat intolerance, polydipsia, polyphagia and polyuria  Genitourinary: Negative for decreased urine volume, difficulty urinating, dysuria, frequency and hematuria     Musculoskeletal: Negative for arthralgias, back pain, gait problem, joint swelling and myalgias  Skin: Negative for color change, rash and wound  Allergic/Immunologic: Negative for environmental allergies and food allergies  Neurological: Negative for dizziness, syncope, weakness, light-headedness, numbness and headaches  Hematological: Negative for adenopathy  Does not bruise/bleed easily  Psychiatric/Behavioral: Negative for sleep disturbance and suicidal ideas  The patient is not nervous/anxious  Objective:  Vitals:    11/08/18 1523   BP: 144/80   Pulse: 101   Temp: 97 7 °F (36 5 °C)   SpO2: 97%      Physical Exam   Constitutional: He is oriented to person, place, and time  He appears well-developed and well-nourished  No distress  HENT:   Head: Normocephalic and atraumatic  Right Ear: External ear normal    Left Ear: External ear normal    Eyes: Conjunctivae are normal  No scleral icterus  Neck: Normal range of motion  Neck supple  Cardiovascular: Normal rate, regular rhythm and normal heart sounds  Pulmonary/Chest: Effort normal and breath sounds normal  He has no wheezes  He exhibits no tenderness  Abdominal: Soft  Bowel sounds are normal  He exhibits no distension  There is no tenderness  There is no guarding  Musculoskeletal: Normal range of motion  Lymphadenopathy:     He has no cervical adenopathy  Neurological: He is alert and oriented to person, place, and time  He has normal reflexes  Skin: Skin is warm and dry  Psychiatric: He has a normal mood and affect   His behavior is normal  Judgment and thought content normal

## 2018-11-14 LAB
ALBUMIN SERPL-MCNC: 4.4 G/DL (ref 3.6–5.1)
ALBUMIN/GLOB SERPL: 1.6 (CALC) (ref 1–2.5)
ALP SERPL-CCNC: 83 U/L (ref 40–115)
ALT SERPL-CCNC: 32 U/L (ref 9–46)
AST SERPL-CCNC: 22 U/L (ref 10–40)
BASOPHILS # BLD AUTO: 17 CELLS/UL (ref 0–200)
BASOPHILS NFR BLD AUTO: 0.3 %
BILIRUB SERPL-MCNC: 0.5 MG/DL (ref 0.2–1.2)
BUN SERPL-MCNC: 12 MG/DL (ref 7–25)
BUN/CREAT SERPL: ABNORMAL (CALC) (ref 6–22)
CALCIUM SERPL-MCNC: 9.8 MG/DL (ref 8.6–10.3)
CHLORIDE SERPL-SCNC: 102 MMOL/L (ref 98–110)
CO2 SERPL-SCNC: 29 MMOL/L (ref 20–32)
CREAT SERPL-MCNC: 0.73 MG/DL (ref 0.6–1.35)
EOSINOPHIL # BLD AUTO: 80 CELLS/UL (ref 15–500)
EOSINOPHIL NFR BLD AUTO: 1.4 %
ERYTHROCYTE [DISTWIDTH] IN BLOOD BY AUTOMATED COUNT: 13.3 % (ref 11–15)
GLOBULIN SER CALC-MCNC: 2.8 G/DL (CALC) (ref 1.9–3.7)
GLUCOSE SERPL-MCNC: 102 MG/DL (ref 65–99)
HCT VFR BLD AUTO: 43.7 % (ref 38.5–50)
HGB BLD-MCNC: 14.9 G/DL (ref 13.2–17.1)
LYMPHOCYTES # BLD AUTO: 2160 CELLS/UL (ref 850–3900)
LYMPHOCYTES NFR BLD AUTO: 37.9 %
MCH RBC QN AUTO: 32 PG (ref 27–33)
MCHC RBC AUTO-ENTMCNC: 34.1 G/DL (ref 32–36)
MCV RBC AUTO: 94 FL (ref 80–100)
MONOCYTES # BLD AUTO: 507 CELLS/UL (ref 200–950)
MONOCYTES NFR BLD AUTO: 8.9 %
NEUTROPHILS # BLD AUTO: 2936 CELLS/UL (ref 1500–7800)
NEUTROPHILS NFR BLD AUTO: 51.5 %
PLATELET # BLD AUTO: 185 THOUSAND/UL (ref 140–400)
PMV BLD REES-ECKER: 11.3 FL (ref 7.5–12.5)
POTASSIUM SERPL-SCNC: 4.7 MMOL/L (ref 3.5–5.3)
PROT SERPL-MCNC: 7.2 G/DL (ref 6.1–8.1)
RBC # BLD AUTO: 4.65 MILLION/UL (ref 4.2–5.8)
SL AMB EGFR AFRICAN AMERICAN: 130 ML/MIN/1.73M2
SL AMB EGFR NON AFRICAN AMERICAN: 112 ML/MIN/1.73M2
SODIUM SERPL-SCNC: 139 MMOL/L (ref 135–146)
WBC # BLD AUTO: 5.7 THOUSAND/UL (ref 3.8–10.8)

## 2018-11-15 ENCOUNTER — OFFICE VISIT (OUTPATIENT)
Dept: GASTROENTEROLOGY | Facility: CLINIC | Age: 43
End: 2018-11-15
Payer: COMMERCIAL

## 2018-11-15 VITALS
DIASTOLIC BLOOD PRESSURE: 90 MMHG | HEART RATE: 95 BPM | BODY MASS INDEX: 30.31 KG/M2 | SYSTOLIC BLOOD PRESSURE: 122 MMHG | WEIGHT: 211.25 LBS

## 2018-11-15 DIAGNOSIS — K86.1 CHRONIC PANCREATITIS, UNSPECIFIED PANCREATITIS TYPE (HCC): ICD-10-CM

## 2018-11-15 DIAGNOSIS — K21.00 GASTROESOPHAGEAL REFLUX DISEASE WITH ESOPHAGITIS: ICD-10-CM

## 2018-11-15 DIAGNOSIS — Z12.11 COLON CANCER SCREENING: Primary | ICD-10-CM

## 2018-11-15 DIAGNOSIS — R10.12 LUQ ABDOMINAL PAIN: ICD-10-CM

## 2018-11-15 PROCEDURE — 99214 OFFICE O/P EST MOD 30 MIN: CPT | Performed by: PHYSICIAN ASSISTANT

## 2018-11-15 NOTE — PROGRESS NOTES
[facility] - Outpatient Follow-up Note  Shima Po 37 y o  male MRN: [id number]  Encounter: [id number]          ASSESSMENT AND PLAN:      1  Chronic Pancreatitis  - 2/2 hypertriglyceridemia on multiple agents for control but his last check showed TG in 1200s  - Appt scheduled with Dr Alia Chu later this month for further help with control of TG  - Continue creon with meals and snacks  - Continue follow up with Dr Lorie Metzger for splanchnic nerve blocks and control of chronic pancreatitis pain  - Discussed importance of abstaining from alcohol, pt reports no alcohol use  - He continues to smoke and is aware of the associated with pancreatitis and pancreatic cancer    2  LUQ Abdominal Pain  - Suspect he had an acute viral enteritis when he went to the ER on 11/6 with abdominal pain, N/V/D, and mildly elevated LFTs  - He has some residual LUQ pain but his symptoms have otherwise resolved and his LFTs are back to normal  - Stress importance of controlling TG and smoking cessation as these will contribute to chronic pain from continued inflammation of the pancreas    3  GERD with esophagitis  - Continue protonix 40 mg BID; stop zantac 150 mg BID  - If symptoms remain controlled for the next 2-3 months, can try decreasing protonix to daily and eventually stop PPI    4  Colon Cancer Screening  - Pt is 37, new guideline suggest starting colon cancer screening at 39  - No alarms symptoms or known family history  - Pt will check with insurance for coverage and let us know/schedule  - Will set up reminder for the new year to schedule colonoscopy regardless      Follow up in February  Call if any issues in the meantime  ______________________________________________________________________    SUBJECTIVE:  Mr Trisha Mcclellan is a 36 yo M presenting for follow-up of an episode of abdominal pain, nausea, vomiting, diarrhea or 2 weeks ago after he returned from a trip to [place]     The pain felt like his recurrent pancreatitis which is typically from his high triglycerides but he does not typically have diarrhea accompanied with the pain  He was seen in the ER and had a CT scan which did not show any pancreatitis but showed an area of focal inflammation in the duodenum  He also had mildly elevated LFTs  He reports his diarrhea resolved after a few days but he has continued to have some abdominal pain in the left side of his abdomen which has improved some  He had blood work on the 13th which showed resolution of his abnormal LFTs  He denies any sick contacts while in [place] and his wife was fine  He denies any alcohol use while he was in [place]  He does continue to smoke and he is aware of the risks of pancreatitis and recurrent episodes of pain with smoking  His triglycerides were noted to be elevated to 1200 when he was at the hospital and he is going to be seeing a a cardiologist who focuses on triglycerides management later this month  Of note, he did have multiple EGDs this year the last being in May and this showed mild distal esophagitis without any other abnormalities  He is taking protonix 40 mg BID and zatac BID without any symptoms of reflux  REVIEW OF SYSTEMS IS OTHERWISE NEGATIVE  Historical Information   Past Medical History:   Diagnosis Date    Asthma     Chronic pain disorder     GERD (gastroesophageal reflux disease)     Hyperlipidemia     Liver abscess     Meniscus tear     left knee work injury last assessed 08/24/2016    Pancreatitis     Pneumonia      Past Surgical History:   Procedure Laterality Date    CELIAC PLEXUS BLOCK Bilateral 10/29/2018    Procedure: SPLANCHNIC NERVE BLOCK;  Surgeon: Letitia Ramirez MD;  [facility];  Service: Pain Management     CHOLECYSTECTOMY      ESOPHAGOGASTRODUODENOSCOPY N/A 11/16/2017    Procedure: ESOPHAGOGASTRODUODENOSCOPY (EGD); Surgeon: Ashley Negron MD;  [facility];   Service: Gastroenterology   Guru Ackerman ESOPHAGOGASTRODUODENOSCOPY N/A 4/19/2018    Procedure: ESOPHAGOGASTRODUODENOSCOPY (EGD); Surgeon: Latoya Nath MD;  [facility]; Service: Gastroenterology    ESOPHAGOGASTRODUODENOSCOPY N/A 5/10/2018    Procedure: ESOPHAGOGASTRODUODENOSCOPY (EGD); Surgeon: Usman Hartley MD;  [facility];   Service: Gastroenterology    KNEE ARTHROSCOPY Bilateral     KNEE ARTHROSCOPY Right 2009    cibishino last assessed 08/24/2016   Brooke Dong LIVER SURGERY      NERVE BLOCK Bilateral 5/29/2018    Procedure: SPLANCHNIC NERVE BLOCK;  Surgeon: Rafi Silverman MD;  [facility];  Service: Pain Management     PANCREAS SURGERY      stents    MD INJECT NERV [address] Bilateral 5/9/2017    Procedure: CELIAC PLEXUS BLOCK ;  Surgeon: Rafi Silverman MD;  [facility];  Service: Pain Management     MD INJECT NERV BLCK,CELIAC PLEXUS Bilateral 6/1/2017    Procedure: SPLANCHNIC NERVE BLOCK at T12;  Surgeon: Rafi Silverman MD;  [facility];  Service: Pain Management     MD INJECT NERV BLCK,CELIAC PLEXUS Bilateral 8/8/2017    Procedure: BILATERAL SPLANCHNIC NERVE BLOCK T12;  Surgeon: Rafi Silverman MD;  [facility];  Service: Pain Management     MD INJECT NERV Sharlon Bennington Bilateral 12/28/2017    Procedure: SPLANCHNIC NERVE BLOCK;  Surgeon: Rafi Silverman MD;  [facility];  Service: Pain Management     MD LAP,CHOLECYSTECTOMY N/A 2/14/2017    Procedure: LAPAROSCOPIC CHOLECYSTECTOMY, IOC, POSSIBLE OPEN ;  Surgeon: Chance Cisneros MD;  [facility];  Service: General    ROTATOR CUFF REPAIR Right     SHOULDER ARTHROSCOPY      SHOULDER ARTHROSCOPY Right      Social History   History   Alcohol Use    0 6 oz/week    1 Cans of beer per week     Comment: rarely, occasionally, history of no alcohol use per allscripts     History   Drug Use No     History   Smoking Status    Current Every Day Smoker    Packs/day: 0 50    Years: 20 00   Smokeless Tobacco    Never Used     Family History   Problem Relation Age of Onset    Cirrhosis Mother     Heart disease Other         cardiac disorder    Cancer Other        Meds/Allergies       Current Outpatient Prescriptions:     albuterol (VENTOLIN HFA) 90 mcg/act inhaler    Choline Fenofibrate (FENOFIBRIC ACID) 135 MG CPDR    EPINEPHrine (EPIPEN 2-ARIEL) 0 3 mg/0 3 mL SOAJ    niacin (NIASPAN) 500 mg CR tablet    omega-3-acid ethyl esters (LOVAZA) 1 g capsule    ondansetron (ZOFRAN-ODT) 4 mg disintegrating tablet    pancrelipase, Lip-Prot-Amyl, (CREON) 12,000 units capsule    Pancrelipase, Lip-Prot-Amyl, (CREON) 60345 units CPEP    pantoprazole (PROTONIX) 40 mg tablet    ranitidine (ZANTAC) 150 MG capsule    rosuvastatin (CRESTOR) 40 MG tablet    traZODone (DESYREL) 50 mg tablet    pregabalin (LYRICA) 100 mg capsule  No current facility-administered medications for this visit  Facility-Administered Medications Ordered in Other Visits:     oxyCODONE-acetaminophen (PERCOCET) 5-325 mg per tablet 2 tablet, 2 tablet, Oral, Once    Allergies   Allergen Reactions    Bee Venom Swelling           Objective     Blood pressure 122/90, pulse 95, weight 95 8 kg (211 lb 4 oz)  Body mass index is 30 31 kg/m²  PHYSICAL EXAM:      General Appearance:   Alert, cooperative, no distress   HEENT:   Normocephalic, atraumatic, anicteric      Neck:  Supple, symmetrical, trachea midline   Lungs:   Clear to auscultation bilaterally; no rales, rhonchi or wheezing; respirations unlabored    Heart[de-identified]   Regular rate and rhythm; no murmur, rub, or gallop  Abdomen:   Soft, non-tender, non-distended; normal bowel sounds; no masses, no organomegaly    Genitalia:   Deferred    Rectal:   Deferred    Extremities:  No cyanosis, clubbing or edema    Pulses:  2+ and symmetric    Skin:  No jaundice, rashes, or lesions    Lymph nodes:  No palpable cervical lymphadenopathy        Lab Results:   No visits with results within 1 Day(s) from this visit  Latest known visit with results is:   Orders Only on 11/13/2018   Component Date Value    Glucose, Random 11/13/2018 102*    BUN 11/13/2018 12     Creatinine 11/13/2018 0 73     eGFR Non  11/13/2018 112     SL AMB EGFR  AMER* 11/13/2018 130     SL AMB BUN/CREATININE RA* 08/37/4673 NOT APPLICABLE     Sodium 65/05/1955 139     Potassium 11/13/2018 4 7     Chloride 11/13/2018 102     CO2 11/13/2018 29     SL AMB CALCIUM 11/13/2018 9 8     SL AMB PROTEIN, TOTAL 11/13/2018 7 2     Albumin 11/13/2018 4 4     Globulin 11/13/2018 2 8     Albumin/Globulin Ratio 11/13/2018 1 6     TOTAL BILIRUBIN 11/13/2018 0 5     Alkaline Phosphatase 11/13/2018 83     SL AMB AST 11/13/2018 22     SL AMB ALT 11/13/2018 32     White Blood Cell Count 11/13/2018 5 7     Red Blood Cell Count 11/13/2018 4 65     Hemoglobin 11/13/2018 14 9     HCT 11/13/2018 43 7     MCV 11/13/2018 94 0     MCH 11/13/2018 32 0     MCHC 11/13/2018 34 1     RDW 11/13/2018 13 3     Platelet Count 30/46/4086 185     SL AMB MPV 11/13/2018 11 3     Neutrophils (Absolute) 11/13/2018 2936     Lymphocytes (Absolute) 11/13/2018 2160     Monocytes (Absolute) 11/13/2018 507     Eosinophils (Absolute) 11/13/2018 80     Basophils ABS 11/13/2018 17     Neutrophils 11/13/2018 51 5     Lymphocytes 11/13/2018 37 9     Monocytes 11/13/2018 8 9     Eosinophils 11/13/2018 1 4     Basophils PCT 11/13/2018 0 3          Radiology Results:   Xr Spine Thoracic 2 Vw    Result Date: 10/30/2018  Narrative: THORACIC SPINE INDICATION: 49-year-old male, splanchnic nerve block COMPARISON:  5/29/2018 VIEWS:  XR SPINE THORACIC 2 VW 5 portable C-arm spot films 2 minutes, 6 seconds fluoroscopy time FINDINGS: Fluoroscopic guidance for pain management procedure  Bilateral spinal needles project at the level of the ventral aspect of L1 vertebra  Prevertebral contrast was injected  Please see procedure report for further details    Similar to previous  Impression: Fluoroscopic guidance for pain management procedure  Please see procedure report for further details Workstation performed: [id number]     Ct Abdomen Pelvis With Contrast    Result Date: 11/6/2018  Narrative: CT ABDOMEN AND PELVIS WITH IV CONTRAST INDICATION:   N/V/D, hx recurrent pancreatitis  "Patient presents with adbominal pain for the last 2 days  +n/v/d Has history of Pancreatitis" COMPARISON:  CT abdomen pelvis 9/11/2018  TECHNIQUE:  CT examination of the abdomen and pelvis was performed  Axial, sagittal, and coronal 2D reformatted images were created from the source data and submitted for interpretation  Radiation dose length product (DLP) for this visit:  669 mGy-cm   This examination, like all CT scans performed in the [facility], was performed utilizing techniques to minimize radiation dose exposure, including the use of iterative reconstruction and automated exposure control  IV Contrast:  100 mL of iohexol (OMNIPAQUE) Enteric Contrast:  Enteric contrast was not administered  FINDINGS: ABDOMEN LOWER CHEST:  No clinically significant abnormality identified in the visualized lower chest  LIVER/BILIARY TREE:  Unremarkable  GALLBLADDER:  Gallbladder is surgically absent  SPLEEN:  Unremarkable  PANCREAS:  Status post partial pancreatectomy  No definite pancreatitis  ADRENAL GLANDS:  Unremarkable  KIDNEYS/URETERS:  Unremarkable  No hydronephrosis  STOMACH AND BOWEL:  Focal thickening of the proximal duodenum with surrounding fat stranding in keeping with a nonspecific duodenitis  APPENDIX:  Noninflamed  ABDOMINOPELVIC CAVITY:  No ascites or free intraperitoneal air  No lymphadenopathy  VESSELS:  Unremarkable for patient's age  PELVIS REPRODUCTIVE ORGANS:  Unremarkable for patient's age  URINARY BLADDER:  Unremarkable  ABDOMINAL WALL/INGUINAL REGIONS:  Unremarkable  OSSEOUS STRUCTURES:  No acute fracture or destructive osseous lesion     Impression: Mild diffuse thickening of the proximal duodenum with some surrounding fat stranding in keeping with a nonspecific duodenitis  Ulcer disease is part of the differential diagnosis  No definite pancreatitis

## 2018-11-21 ENCOUNTER — OFFICE VISIT (OUTPATIENT)
Dept: CARDIOLOGY CLINIC | Facility: CLINIC | Age: 43
End: 2018-11-21
Payer: COMMERCIAL

## 2018-11-21 VITALS
BODY MASS INDEX: 30.49 KG/M2 | SYSTOLIC BLOOD PRESSURE: 122 MMHG | HEIGHT: 70 IN | DIASTOLIC BLOOD PRESSURE: 78 MMHG | WEIGHT: 213 LBS | HEART RATE: 85 BPM

## 2018-11-21 DIAGNOSIS — E78.1 HYPERTRIGLYCERIDEMIA: ICD-10-CM

## 2018-11-21 DIAGNOSIS — Z00.00 HEALTHCARE MAINTENANCE: ICD-10-CM

## 2018-11-21 DIAGNOSIS — K86.1 OTHER CHRONIC PANCREATITIS (HCC): Primary | ICD-10-CM

## 2018-11-21 DIAGNOSIS — F17.200 SMOKER: ICD-10-CM

## 2018-11-21 PROCEDURE — 99214 OFFICE O/P EST MOD 30 MIN: CPT | Performed by: INTERNAL MEDICINE

## 2018-11-21 RX ORDER — NIACIN 500 MG/1
1000 TABLET, EXTENDED RELEASE ORAL
Qty: 90 TABLET | Refills: 3 | Status: ON HOLD | OUTPATIENT
Start: 2018-11-21 | End: 2019-04-17 | Stop reason: SDUPTHER

## 2018-11-21 NOTE — PROGRESS NOTES
Cardiology Consultation     Washington Grajeda  [phone]  1975  HEART & VASCULAR [facility] [address] [facility]    I had the pleasure of seeing Washington Grajeda for a consultation regarding hypertriglyceridemia requested by Dr Burgos    History of the Presenting Illness, Discussion/Summary and My Plan are as follows:    He is a pleasant 53-year-old gentleman with a history of hypertriglyceridemia, multiple episodes of acute pancreatitis, with resulting chronic pancreatitis and pancreatic pseudocyst, also complicated by hepatic abscess in the past, chronic pain, status post celiac plexus block  He drinks about 6 alcoholic drinks a week  Also smokes about half pack of cigarettes a day  A detailed diet history was obtained, except for some white rise in his diet, overall intake of refined carbohydrates and fat is low  Has put on some weight ever since he has been on Lyrica  Is 28 and he exercises by walking about a mi a day  Recent admission for acute pancreatitis was in September 2018, admission triglycerides were 1600, underwent plasmapheresis with normalization of triglycerides, again 1200 on 11/6/2018  Cardiac physical exam is unremarkable  No stigmata of hypertriglyceridemia on exam       Plan:    Hypertriglyceridemia complicated by multiple episodes of acute pancreatitis:  Patient also has chronic pancreatitis and pancreatic pseudocyst   It appears that he has these episodes mostly when his triglycerides are over 1000  He is already on maximum rosuvastatin, Lovaza and fenofibrate, on 500 mg of extended release niacin  I will increase his niacin to 1000 mg nightly  This can further be increased to 2000 mg if necessary  Other modifiable risk factors are tobacco use, alcohol use, weight gain and increasing activity    I told him to cut down his alcohol intake substantially to no more than 1-2 drinks per week which he is willing to do  To exercise more and try to lose weight-it appears that he has gained weight ever since he has been on Lyrica  I will also check an exercise treadmill stress test since he has not had 1 recently, non HDL cholesterol levels are around 150  Check A1c, was 5 6 in 2016    Follow-up in in 4 months with lipid profile    Printed information was also provided  Dietary changes were discussed in detail to lower triglycerides, printed information was also provided  Overall decrease intake of carbohydrates and substitute with salads/fresh fruit/greens  Substitute  whole wheat products (with more fiber) instead of refined carbohydrate such as white rice, white pasta, sugar, white bread etc    Overall increase intake of fiber/salads  Better control of his diabetes will also help-the above dietary changes should also help improve Diabetes control  Limit intake of Alcohol to a minimum - no more than 2 drinks per week - if your triglycerides are high  He has already seen 2 dietitians  No diagnosis found    Patient Active Problem List   Diagnosis    Pancreatic lesion    Renal mass    Smoker    RBBB    Epigastric pain    Acute gastritis without hemorrhage    Abdominal pain    Abnormal transaminases    Acute on chronic pancreatitis (HCC)    Leukopenia    Hypertriglyceridemia    Chronic pain syndrome    Esophagitis    Other chronic pancreatitis (HCC)    Chronic gastritis without bleeding    Uncomplicated opioid dependence (HCC)    Long-term current use of opiate analgesic    GERD (gastroesophageal reflux disease)    Hyponatremia     Past Medical History:   Diagnosis Date    Asthma     Chronic pain disorder     GERD (gastroesophageal reflux disease)     Hyperlipidemia     Liver abscess     Meniscus tear     left knee work injury last assessed 08/24/2016    Pancreatitis     Pneumonia      Social History     Social History    Marital status: /Civil Union     Spouse name: N/A    Number of children: N/A    Years of education: N/A     Occupational History    fulltime employment      Social History Main Topics    Smoking status: Current Every Day Smoker     Packs/day: 0 50     Years: 20 00    Smokeless tobacco: Never Used    Alcohol use 0 6 oz/week     1 Cans of beer per week      Comment: rarely, occasionally, history of no alcohol use per allscripts    Drug use: No    Sexual activity: No     Other Topics Concern    Not on file     Social History Narrative    Lives with family    Daily caffeine consumption          Family History   Problem Relation Age of Onset    Cirrhosis Mother     Heart disease Other         cardiac disorder    Cancer Other      Past Surgical History:   Procedure Laterality Date    CELIAC PLEXUS BLOCK Bilateral 10/29/2018    Procedure: SPLANCHNIC NERVE BLOCK;  Surgeon: Zofia Reyna MD;  [facility];  Service: Pain Management     CHOLECYSTECTOMY      ESOPHAGOGASTRODUODENOSCOPY N/A 11/16/2017    Procedure: ESOPHAGOGASTRODUODENOSCOPY (EGD); Surgeon: Erna Braga MD;  [facility]; Service: Gastroenterology    ESOPHAGOGASTRODUODENOSCOPY N/A 4/19/2018    Procedure: ESOPHAGOGASTRODUODENOSCOPY (EGD); Surgeon: Nils Moritz, MD;  [facility]; Service: Gastroenterology    ESOPHAGOGASTRODUODENOSCOPY N/A 5/10/2018    Procedure: ESOPHAGOGASTRODUODENOSCOPY (EGD); Surgeon: Danae Montes MD;  [facility];   Service: Gastroenterology    KNEE ARTHROSCOPY Bilateral     KNEE ARTHROSCOPY Right 2009    cibishino last assessed 08/24/2016   Gabbi Stillar LIVER SURGERY      NERVE BLOCK Bilateral 5/29/2018    Procedure: SPLANCHNIC NERVE BLOCK;  Surgeon: Zofia Reyna MD;  [facility];  Service: Pain Management     PANCREAS SURGERY      stents    VA INJECT NERV BLCK,CELIAC PLEXUS Bilateral 5/9/2017    Procedure: CELIAC PLEXUS BLOCK ;  Surgeon: Zofia Reyna MD;  [facility];  Service: Pain Management     ME INJECT NERV BLCK,CELIAC PLEXUS Bilateral 6/1/2017    Procedure: SPLANCHNIC NERVE BLOCK at T12;  Surgeon: Sherron Crespo MD;  [facility];  Service: Pain Management     ME INJECT NERV BLCK,CELIAC PLEXUS Bilateral 8/8/2017    Procedure: BILATERAL SPLANCHNIC NERVE BLOCK T12;  Surgeon: Sherron Crespo MD;  [facility];  Service: Pain Management     ME INJECT NERV Abraham Shola Bilateral 12/28/2017    Procedure: SPLANCHNIC NERVE BLOCK;  Surgeon: Sherron Crespo MD;  [facility];  Service: Pain Management     ME LAP,CHOLECYSTECTOMY N/A 2/14/2017    Procedure: LAPAROSCOPIC CHOLECYSTECTOMY, IOC, POSSIBLE OPEN ;  Surgeon: Rambo Dumont MD;  [facility];  Service: General    ROTATOR CUFF REPAIR Right     SHOULDER ARTHROSCOPY      SHOULDER ARTHROSCOPY Right        Current Outpatient Prescriptions:     albuterol (VENTOLIN HFA) 90 mcg/act inhaler, Inhale 2 puffs every 6 (six) hours as needed for wheezing Prn, Disp: 18 g, Rfl: 2    Choline Fenofibrate (FENOFIBRIC ACID) 135 MG CPDR, Take 1 capsule by mouth daily, Disp: , Rfl:     EPINEPHrine (EPIPEN 2-LARRY) 0 3 mg/0 3 mL SOAJ, EpiPen 2-Larry 0 3 MG/0 3ML Injection Solution Auto-injector INJECT 0 3ML INTRAMUSCULARLY AS DIRECTED    Quantity: 1;  Refills: 1   Toro Briceño ; Active 2 Solution Auto-injector Pen, Disp: , Rfl:     niacin (NIASPAN) 500 mg CR tablet, TAKE 1 TABLET DAILY WITH BREAKFAST, Disp: 90 tablet, Rfl: 0    omega-3-acid ethyl esters (LOVAZA) 1 g capsule, TAKE 2 CAPSULES TWICE A DAY, Disp: 360 capsule, Rfl: 1    ondansetron (ZOFRAN-ODT) 4 mg disintegrating tablet, DISSOLVE ONE TABLET IN MOUTH EVERY EIGHT HOURS AS NEEDED NAUSEA AND VOMITING , Disp: 20 tablet, Rfl: 0    pancrelipase, Lip-Prot-Amyl, (CREON) 12,000 units capsule, Take 12,000 units of lipase by mouth as needed for snacks, Disp: 60 capsule, Rfl: 2    Pancrelipase, Lip-Prot-Amyl, (CREON) 78945 units CPEP, Take 1 capsule (36,000 Units total) by mouth 3 (three) times a day before meals, Disp: 180 capsule, Rfl: 2    pantoprazole (PROTONIX) 40 mg tablet, TAKE 1 TABLET TWICE A DAY, Disp: 180 tablet, Rfl: 0    pregabalin (LYRICA) 100 mg capsule, Take 1 capsule (100 mg total) by mouth 3 (three) times a day for 30 days, Disp: 90 capsule, Rfl: 2    ranitidine (ZANTAC) 150 MG capsule, Take 150 mg by mouth 2 (two) times a day, Disp: , Rfl:     rosuvastatin (CRESTOR) 40 MG tablet, Take 1 tablet (40 mg total) by mouth daily, Disp: 90 tablet, Rfl: 1    traZODone (DESYREL) 50 mg tablet, Take 1 tablet (50 mg total) by mouth daily at bedtime (Patient taking differently: Take by mouth daily at bedtime  ), Disp: 30 tablet, Rfl: 3  No current facility-administered medications for this visit  Facility-Administered Medications Ordered in Other Visits:     oxyCODONE-acetaminophen (PERCOCET) 5-325 mg per tablet 2 tablet, 2 tablet, Oral, Once, Iris Robison MD  Allergies   Allergen Reactions    Bee Venom Swelling     Vitals:    11/21/18 1129   BP: 122/78   BP Location: Left arm   Patient Position: Sitting   Cuff Size: Large   Pulse: 85   Weight: 96 6 kg (213 lb)   Height: 5' 10" (1 778 m)       Labs:not applicable    Imaging: Xr Spine Thoracic 2 Vw    Result Date: 10/30/2018  Narrative: THORACIC SPINE INDICATION: 57-year-old male, splanchnic nerve block COMPARISON:  5/29/2018 VIEWS:  XR SPINE THORACIC 2 VW 5 portable C-arm spot films 2 minutes, 6 seconds fluoroscopy time FINDINGS: Fluoroscopic guidance for pain management procedure  Bilateral spinal needles project at the level of the ventral aspect of L1 vertebra  Prevertebral contrast was injected  Please see procedure report for further details  Similar to previous  Impression: Fluoroscopic guidance for pain management procedure    Please see procedure report for further details Workstation performed: [id number]     Ct Abdomen Pelvis With Contrast    Result Date: 11/6/2018  Narrative: CT ABDOMEN AND PELVIS WITH IV CONTRAST INDICATION:   N/V/D, hx recurrent pancreatitis  "Patient presents with adbominal pain for the last 2 days  +n/v/d Has history of Pancreatitis" COMPARISON:  CT abdomen pelvis 9/11/2018  TECHNIQUE:  CT examination of the abdomen and pelvis was performed  Axial, sagittal, and coronal 2D reformatted images were created from the source data and submitted for interpretation  Radiation dose length product (DLP) for this visit:  669 mGy-cm   This examination, like all CT scans performed in the [facility], was performed utilizing techniques to minimize radiation dose exposure, including the use of iterative reconstruction and automated exposure control  IV Contrast:  100 mL of iohexol (OMNIPAQUE) Enteric Contrast:  Enteric contrast was not administered  FINDINGS: ABDOMEN LOWER CHEST:  No clinically significant abnormality identified in the visualized lower chest  LIVER/BILIARY TREE:  Unremarkable  GALLBLADDER:  Gallbladder is surgically absent  SPLEEN:  Unremarkable  PANCREAS:  Status post partial pancreatectomy  No definite pancreatitis  ADRENAL GLANDS:  Unremarkable  KIDNEYS/URETERS:  Unremarkable  No hydronephrosis  STOMACH AND BOWEL:  Focal thickening of the proximal duodenum with surrounding fat stranding in keeping with a nonspecific duodenitis  APPENDIX:  Noninflamed  ABDOMINOPELVIC CAVITY:  No ascites or free intraperitoneal air  No lymphadenopathy  VESSELS:  Unremarkable for patient's age  PELVIS REPRODUCTIVE ORGANS:  Unremarkable for patient's age  URINARY BLADDER:  Unremarkable  ABDOMINAL WALL/INGUINAL REGIONS:  Unremarkable  OSSEOUS STRUCTURES:  No acute fracture or destructive osseous lesion  Impression: Mild diffuse thickening of the proximal duodenum with some surrounding fat stranding in keeping with a nonspecific duodenitis  Ulcer disease is part of the differential diagnosis  No definite pancreatitis   Workstation performed: [id number]       Review of Systems:  Review of Systems   Constitutional: Negative  HENT: Negative  Eyes: Negative  Respiratory: Negative  Cardiovascular: Negative  Gastrointestinal: Positive for abdominal pain  Negative for abdominal distention, anal bleeding, blood in stool, constipation, diarrhea, nausea, rectal pain and vomiting  Endocrine: Negative  Genitourinary: Negative  Musculoskeletal: Negative  Skin: Negative  Allergic/Immunologic: Negative  Neurological: Negative  Hematological: Negative  Psychiatric/Behavioral: Negative  Physical Exam:  Physical Exam   Constitutional: He appears well-developed and well-nourished  No distress  HENT:   Head: Normocephalic and atraumatic  Eyes: Pupils are equal, round, and reactive to light  Right eye exhibits no discharge  Left eye exhibits no discharge  Neck: Normal range of motion  No JVD present  No tracheal deviation present  No thyromegaly present  Cardiovascular: Normal rate and intact distal pulses  Exam reveals no friction rub  No murmur heard  Pulmonary/Chest: Effort normal  No stridor  No respiratory distress  He has no wheezes  He has no rales  Abdominal: Soft  He exhibits no distension  There is no tenderness  There is no rebound  Musculoskeletal: Normal range of motion  He exhibits no edema or deformity  Neurological: He is alert  No cranial nerve deficit  Coordination normal    Skin: Skin is warm  No rash noted  He is not diaphoretic  No erythema  No pallor

## 2018-11-26 ENCOUNTER — OFFICE VISIT (OUTPATIENT)
Dept: FAMILY MEDICINE CLINIC | Facility: CLINIC | Age: 43
End: 2018-11-26
Payer: COMMERCIAL

## 2018-11-26 VITALS
HEIGHT: 70 IN | SYSTOLIC BLOOD PRESSURE: 120 MMHG | BODY MASS INDEX: 30.92 KG/M2 | HEART RATE: 94 BPM | OXYGEN SATURATION: 98 % | WEIGHT: 216 LBS | DIASTOLIC BLOOD PRESSURE: 78 MMHG | TEMPERATURE: 97.4 F

## 2018-11-26 DIAGNOSIS — K21.00 GASTROESOPHAGEAL REFLUX DISEASE WITH ESOPHAGITIS: ICD-10-CM

## 2018-11-26 DIAGNOSIS — G89.4 CHRONIC PAIN SYNDROME: ICD-10-CM

## 2018-11-26 DIAGNOSIS — E78.1 HYPERTRIGLYCERIDEMIA: Primary | ICD-10-CM

## 2018-11-26 DIAGNOSIS — K85.90 ACUTE ON CHRONIC PANCREATITIS (HCC): ICD-10-CM

## 2018-11-26 DIAGNOSIS — K86.1 ACUTE ON CHRONIC PANCREATITIS (HCC): ICD-10-CM

## 2018-11-26 PROCEDURE — 3008F BODY MASS INDEX DOCD: CPT | Performed by: FAMILY MEDICINE

## 2018-11-26 PROCEDURE — 99214 OFFICE O/P EST MOD 30 MIN: CPT | Performed by: FAMILY MEDICINE

## 2018-11-26 NOTE — PROGRESS NOTES
Assessment/Plan:         Diagnoses and all orders for this visit:    Hypertriglyceridemia    Chronic pain syndrome    Acute on chronic pancreatitis ([facility] [address] )    Gastroesophageal reflux disease with esophagitis        Hyperlipidemia  Stable, discussed medications with patient, discussed dietary modifications with patient, will continue current medicines changes, stressed importance of abstinence from alcohol  Chronic pain syndrome/chronic pancreatitis  Stable, continue management pain  GERD  Stable, to continue current medication  Continue to encourage smoking cessation      Subjective:      Patient ID: Valerie Juan is a 37 y o  male  Here to f/u   Doing well ready to go back to work   from the cardiology Increased the niacin to 1000mg , did have a discussion in regard to the plasmapholresis , plans are to get a stress test at some point, otherwise doing well no changes denies any chest pain shortness of breath difficulty breathing  Understand the diet and smoking less than half per day , rare, etoh ,   Seen by GI, no change in medications continues with Roni, has been trying to follow diet, denies any change in abdominal pain discomfort continues to see pain management for chronic pancreatitis  Continues on Zantac for heartburn, in regards to cholesterol continues on niacin plus Crestor plus to fenofibrate        The following portions of the patient's history were reviewed and updated as appropriate:   He has a past medical history of Asthma; Chronic pain disorder; GERD (gastroesophageal reflux disease); Hyperlipidemia; Liver abscess; Meniscus tear; Pancreatitis; and Pneumonia ,   does not have any pertinent problems on file  ,   has a past surgical history that includes Rotator cuff repair (Right); Pancreas surgery; Cholecystectomy; pr lap,cholecystectomy (N/A, 2/14/2017);  Liver surgery; pr inject nerv blck,celiac plexus (Bilateral, 5/9/2017); pr inject nerv blck,celiac plexus (Bilateral, 6/1/2017); pr inject nerv blck,celiac plexus (Bilateral, 8/8/2017); Esophagogastroduodenoscopy (N/A, 11/16/2017); pr inject nerv blck,paravert sympath (Bilateral, 12/28/2017); Knee arthroscopy (Bilateral); Shoulder arthroscopy; Shoulder arthroscopy (Right); Esophagogastroduodenoscopy (N/A, 4/19/2018); Esophagogastroduodenoscopy (N/A, 5/10/2018); Knee arthroscopy (Right, 2009); NERVE BLOCK (Bilateral, 5/29/2018); and Celiac plexus block (Bilateral, 10/29/2018)  ,  family history includes Cancer in his other; Cirrhosis in his mother; Heart disease in his other  ,   reports that he has been smoking  He has a 10 00 pack-year smoking history  He has never used smokeless tobacco  He reports that he drinks about 0 6 oz of alcohol per week   He reports that he does not use drugs  ,  is allergic to bee venom         Review of Systems   Constitutional: Negative for appetite change, chills, fever and unexpected weight change  HENT: Negative for congestion, dental problem, ear pain, hearing loss, postnasal drip, rhinorrhea, sinus pain, sinus pressure, sneezing, sore throat, tinnitus and voice change  Eyes: Negative for visual disturbance  Respiratory: Negative for apnea, cough, chest tightness and shortness of breath  Cardiovascular: Negative for chest pain, palpitations and leg swelling  Gastrointestinal: Negative for abdominal pain, blood in stool, constipation, diarrhea, nausea and vomiting  Endocrine: Negative for cold intolerance, heat intolerance, polydipsia, polyphagia and polyuria  Genitourinary: Negative for decreased urine volume, difficulty urinating, dysuria, frequency and hematuria  Musculoskeletal: Negative for arthralgias, back pain, gait problem, joint swelling and myalgias  Skin: Negative for color change, rash and wound  Allergic/Immunologic: Negative for environmental allergies and food allergies  Neurological: Negative for dizziness, syncope, weakness, light-headedness, numbness and headaches  Hematological: Negative for adenopathy  Does not bruise/bleed easily  Psychiatric/Behavioral: Negative for sleep disturbance and suicidal ideas  The patient is not nervous/anxious  Objective:  Vitals:    11/26/18 0845   BP: 120/78   Pulse: 94   Temp: (!) 97 4 °F (36 3 °C)   SpO2: 98%      Physical Exam   Constitutional: He is oriented to person, place, and time  He appears well-developed and well-nourished  No distress  HENT:   Head: Normocephalic and atraumatic  Eyes: Conjunctivae are normal  No scleral icterus  Neck: Normal range of motion  Neck supple  Cardiovascular: Normal rate, regular rhythm and normal heart sounds  Pulmonary/Chest: Effort normal and breath sounds normal    Abdominal: Soft  Bowel sounds are normal  He exhibits no distension  There is no guarding  Musculoskeletal: Normal range of motion  Neurological: He is alert and oriented to person, place, and time  Skin: Skin is warm and dry  Psychiatric: He has a normal mood and affect   His behavior is normal  Judgment and thought content normal

## 2018-12-05 ENCOUNTER — TELEPHONE (OUTPATIENT)
Dept: GASTROENTEROLOGY | Facility: CLINIC | Age: 43
End: 2018-12-05

## 2018-12-12 ENCOUNTER — OFFICE VISIT (OUTPATIENT)
Dept: PAIN MEDICINE | Facility: CLINIC | Age: 43
End: 2018-12-12
Payer: COMMERCIAL

## 2018-12-12 VITALS
SYSTOLIC BLOOD PRESSURE: 118 MMHG | RESPIRATION RATE: 16 BRPM | WEIGHT: 216 LBS | DIASTOLIC BLOOD PRESSURE: 74 MMHG | HEART RATE: 74 BPM | HEIGHT: 70 IN | BODY MASS INDEX: 30.92 KG/M2

## 2018-12-12 DIAGNOSIS — K86.1 OTHER CHRONIC PANCREATITIS (HCC): Primary | ICD-10-CM

## 2018-12-12 PROCEDURE — 99214 OFFICE O/P EST MOD 30 MIN: CPT | Performed by: ANESTHESIOLOGY

## 2018-12-12 NOTE — PROGRESS NOTES
Assessment:  1  Other chronic pancreatitis (HCC)  [place] spine and pain procedure     Plan: This is a 49-year-old male who presents today for follow-up office visit for management of chronic abdominal pain due to chronic pancreatitis  We have performed a series of bilateral splanchnic nerve blocks which has provided relief of his pain symptoms for 2-3 months duration  Patient is 6 weeks postop  Patient has returned back to work as he was feeling better  Pain is gradually returning, with moderate intensity  I advised patient that we will schedule a repeat bilateral splanchnic nerve block as his pain is becoming more progressive and constant  Patient verbalized understanding  Complete risks and benefits including bleeding, infection, tissue reaction, nerve injury and allergic reaction were discussed  The approach was demonstrated using models and literature was provided  Verbal and written consent was obtained  My impressions and treatment recommendations were discussed in detail with the patient who verbalized understanding and had no further questions  Discharge instructions were provided  I personally saw and examined the patient and I agree with the above discussed plan of care  History of Present Illness:  Ajay Polk is a 37 y o  male who presents for a follow up office visit in regards to Abdominal Pain  The patients current symptoms include chronic abdominal pain from chronic pancreatitis  Patient had a bilateral splanchnic nerve block on October 29, 2018  Patient reports greater than 50% pain relief  Pain relief remains ongoing  Patient is here today for routine follow-up  Patient reports that he had gone back to work and his symptoms where bit aggravated  Pain is 5/10  He is on lyrica 100mg po BID      I have personally reviewed and/or updated the patient's past medical history, past surgical history, family history, social history, current medications, allergies, and vital signs today      Review of Systems   Respiratory: Negative for shortness of breath  Cardiovascular: Negative for chest pain  Gastrointestinal: Negative for constipation, diarrhea, nausea and vomiting  Musculoskeletal: Negative for arthralgias, gait problem, joint swelling and myalgias  Skin: Negative for rash  Neurological: Negative for dizziness, seizures and weakness  All other systems reviewed and are negative  Patient Active Problem List   Diagnosis    Pancreatic lesion    Renal mass    Smoker    RBBB    Epigastric pain    Acute gastritis without hemorrhage    Abdominal pain    Abnormal transaminases    Acute on chronic pancreatitis (HCC)    Leukopenia    Hypertriglyceridemia    Chronic pain syndrome    Esophagitis    Other chronic pancreatitis (HCC)    Chronic gastritis without bleeding    Uncomplicated opioid dependence (HCC)    Long-term current use of opiate analgesic    GERD (gastroesophageal reflux disease)    Hyponatremia    Healthcare maintenance       Past Medical History:   Diagnosis Date    Asthma     Chronic pain disorder     GERD (gastroesophageal reflux disease)     Hyperlipidemia     Liver abscess     Meniscus tear     left knee work injury last assessed 08/24/2016    Pancreatitis     Pneumonia        Past Surgical History:   Procedure Laterality Date    CELIAC PLEXUS BLOCK Bilateral 10/29/2018    Procedure: SPLANCHNIC NERVE BLOCK;  Surgeon: Jose J Murillo MD;  [facility];  Service: Pain Management     CHOLECYSTECTOMY      ESOPHAGOGASTRODUODENOSCOPY N/A 11/16/2017    Procedure: ESOPHAGOGASTRODUODENOSCOPY (EGD); Surgeon: Lukas Perez MD;  [facility]; Service: Gastroenterology    ESOPHAGOGASTRODUODENOSCOPY N/A 4/19/2018    Procedure: ESOPHAGOGASTRODUODENOSCOPY (EGD); Surgeon: Coy Adkins MD;  [facility];   Service: Gastroenterology    ESOPHAGOGASTRODUODENOSCOPY N/A 5/10/2018    Procedure: ESOPHAGOGASTRODUODENOSCOPY (EGD); Surgeon: Ollie Sharpe MD;  [facility];   Service: Gastroenterology    KNEE ARTHROSCOPY Bilateral     KNEE ARTHROSCOPY Right 2009    cibishino last assessed 08/24/2016   [facility] LIVER SURGERY      NERVE BLOCK Bilateral 5/29/2018    Procedure: SPLANCHNIC NERVE BLOCK;  Surgeon: Karyn Peraza MD;  [facility];  Service: Pain Management     PANCREAS SURGERY      stents    WV INJECT NERV BLCK,CELIAC PLEXUS Bilateral 5/9/2017    Procedure: CELIAC PLEXUS BLOCK ;  Surgeon: Karyn Peraza MD;  [facility];  Service: Pain Management     WV INJECT NERV BLCK,CELIAC PLEXUS Bilateral 6/1/2017    Procedure: SPLANCHNIC NERVE BLOCK at T12;  Surgeon: Karyn Peraza MD;  [facility];  Service: Pain Management     WV INJECT NERV BLCK,CELIAC PLEXUS Bilateral 8/8/2017    Procedure: BILATERAL SPLANCHNIC NERVE BLOCK T12;  Surgeon: Karyn Peraza MD;  [facility];  Service: Pain Management     WV INJECT NERV Robert Daily Bilateral 12/28/2017    Procedure: SPLANCHNIC NERVE BLOCK;  Surgeon: Karyn Peraza MD;  [facility];  Service: Pain Management     WV LAP,CHOLECYSTECTOMY N/A 2/14/2017    Procedure: LAPAROSCOPIC CHOLECYSTECTOMY, IOC, POSSIBLE OPEN ;  Surgeon: Maral Fountain MD;  [facility];  Service: General    ROTATOR CUFF REPAIR Right     SHOULDER ARTHROSCOPY      SHOULDER ARTHROSCOPY Right        Family History   Problem Relation Age of Onset    Cirrhosis Mother     Heart disease Other         cardiac disorder    Cancer Other        Social History     Occupational History    fulltime employment      Social History Main Topics    Smoking status: Current Every Day Smoker     Packs/day: 0 50     Years: 20 00    Smokeless tobacco: Never Used    Alcohol use 0 6 oz/week     1 Cans of beer per week      Comment: rarely, occasionally, history of no alcohol use per allscripts    Drug use: No    Sexual activity: No       Current Outpatient Prescriptions on File Prior to Visit   Medication Sig    albuterol (VENTOLIN HFA) 90 mcg/act inhaler Inhale 2 puffs every 6 (six) hours as needed for wheezing Prn    Choline Fenofibrate (FENOFIBRIC ACID) 135 MG CPDR Take 1 capsule by mouth daily    EPINEPHrine (EPIPEN 2-ARIEL) 0 3 mg/0 3 mL SOAJ EpiPen 2-Ariel 0 3 MG/0 3ML Injection Solution Auto-injector  INJECT 0 3ML INTRAMUSCULARLY AS DIRECTED     Quantity: 1;  Refills: 1      Toor Briceño ; Active  2 Solution Auto-injector Pen    niacin (NIASPAN) 500 mg CR tablet Take 2 tablets (1,000 mg total) by mouth daily at bedtime    omega-3-acid ethyl esters (LOVAZA) 1 g capsule TAKE 2 CAPSULES TWICE A DAY    ondansetron (ZOFRAN-ODT) 4 mg disintegrating tablet DISSOLVE ONE TABLET IN MOUTH EVERY EIGHT HOURS AS NEEDED NAUSEA AND VOMITING     pancrelipase, Lip-Prot-Amyl, (CREON) 12,000 units capsule Take 12,000 units of lipase by mouth as needed for snacks    Pancrelipase, Lip-Prot-Amyl, (CREON) 36803 units CPEP Take 1 capsule (36,000 Units total) by mouth 3 (three) times a day before meals    pantoprazole (PROTONIX) 40 mg tablet TAKE 1 TABLET TWICE A DAY    pregabalin (LYRICA) 100 mg capsule Take 1 capsule (100 mg total) by mouth 3 (three) times a day for 30 days    ranitidine (ZANTAC) 150 MG capsule Take 150 mg by mouth 2 (two) times a day    rosuvastatin (CRESTOR) 40 MG tablet Take 1 tablet (40 mg total) by mouth daily    traZODone (DESYREL) 50 mg tablet Take 1 tablet (50 mg total) by mouth daily at bedtime (Patient taking differently: Take by mouth daily at bedtime  )     Current Facility-Administered Medications on File Prior to Visit   Medication    oxyCODONE-acetaminophen (PERCOCET) 5-325 mg per tablet 2 tablet       Allergies   Allergen Reactions    Bee Venom Swelling       Physical Exam:    /74   Pulse 74   Resp 16   Ht 5' 10" (1 778 m)   Wt 98 kg (216 lb)   BMI 30 99 kg/m²     Constitutional:normal, well developed, well nourished, alert, in no distress and non-toxic and no overt pain behavior    Eyes:anicteric  HEENT:grossly intact  Neck:supple, symmetric, trachea midline and no masses   Pulmonary:even and unlabored  Cardiovascular:No edema or pitting edema present  Skin:Normal without rashes or lesions and well hydrated  Psychiatric:Mood and affect appropriate  Neurologic:Cranial Nerves II-XII grossly intact  Musculoskeletal:normal    Imaging

## 2018-12-18 DIAGNOSIS — G47.00 INSOMNIA, UNSPECIFIED TYPE: ICD-10-CM

## 2018-12-18 RX ORDER — TRAZODONE HYDROCHLORIDE 50 MG/1
TABLET ORAL
Qty: 30 TABLET | Refills: 2 | Status: SHIPPED | OUTPATIENT
Start: 2018-12-18 | End: 2019-08-07 | Stop reason: SDUPTHER

## 2019-01-07 NOTE — PRE-PROCEDURE INSTRUCTIONS
Pre-Surgery Instructions:   Medication Instructions    albuterol (VENTOLIN HFA) 90 mcg/act inhaler Patient was instructed by Physician and understands   Choline Fenofibrate (FENOFIBRIC ACID) 135 MG CPDR Patient was instructed by Physician and understands   niacin (NIASPAN) 500 mg CR tablet Patient was instructed by Physician and understands   omega-3-acid ethyl esters (LOVAZA) 1 g capsule Patient was instructed by Physician and understands   pancrelipase, Lip-Prot-Amyl, (CREON) 12,000 units capsule Patient was instructed by Physician and understands   pantoprazole (PROTONIX) 40 mg tablet Patient was instructed by Physician and understands   pregabalin (LYRICA) 100 mg capsule Patient was instructed by Physician and understands   rosuvastatin (CRESTOR) 40 MG tablet Patient was instructed by Physician and understands   traZODone (DESYREL) 50 mg tablet Patient was instructed by Physician and understands

## 2019-01-09 ENCOUNTER — ANESTHESIA EVENT (OUTPATIENT)
Dept: PERIOP | Facility: HOSPITAL | Age: 44
End: 2019-01-09
Payer: COMMERCIAL

## 2019-01-10 ENCOUNTER — ANESTHESIA (OUTPATIENT)
Dept: PERIOP | Facility: HOSPITAL | Age: 44
End: 2019-01-10
Payer: COMMERCIAL

## 2019-01-10 ENCOUNTER — HOSPITAL ENCOUNTER (OUTPATIENT)
Facility: HOSPITAL | Age: 44
Setting detail: OUTPATIENT SURGERY
Discharge: HOME | End: 2019-01-10
Attending: ANESTHESIOLOGY | Admitting: ANESTHESIOLOGY
Payer: COMMERCIAL

## 2019-01-10 ENCOUNTER — APPOINTMENT (OUTPATIENT)
Dept: RADIOLOGY | Facility: HOSPITAL | Age: 44
End: 2019-01-10
Payer: COMMERCIAL

## 2019-01-10 VITALS
BODY MASS INDEX: 30.25 KG/M2 | OXYGEN SATURATION: 96 % | DIASTOLIC BLOOD PRESSURE: 67 MMHG | HEART RATE: 65 BPM | SYSTOLIC BLOOD PRESSURE: 134 MMHG | WEIGHT: 216.05 LBS | TEMPERATURE: 96.9 F | HEIGHT: 71 IN | RESPIRATION RATE: 25 BRPM

## 2019-01-10 DIAGNOSIS — E78.2 MIXED HYPERLIPIDEMIA: ICD-10-CM

## 2019-01-10 DIAGNOSIS — G89.4 CHRONIC PAIN SYNDROME: ICD-10-CM

## 2019-01-10 DIAGNOSIS — G47.00 INSOMNIA, UNSPECIFIED TYPE: ICD-10-CM

## 2019-01-10 DIAGNOSIS — J45.909 UNCOMPLICATED ASTHMA, UNSPECIFIED ASTHMA SEVERITY, UNSPECIFIED WHETHER PERSISTENT: ICD-10-CM

## 2019-01-10 DIAGNOSIS — K86.1 CHRONIC PANCREATITIS, UNSPECIFIED PANCREATITIS TYPE (HCC): ICD-10-CM

## 2019-01-10 DIAGNOSIS — K21.9 GASTROESOPHAGEAL REFLUX DISEASE WITHOUT ESOPHAGITIS: ICD-10-CM

## 2019-01-10 PROCEDURE — 72070 X-RAY EXAM THORAC SPINE 2VWS: CPT

## 2019-01-10 PROCEDURE — 64530 N BLOCK INJ CELIAC PELUS: CPT | Performed by: ANESTHESIOLOGY

## 2019-01-10 RX ORDER — PANTOPRAZOLE SODIUM 40 MG/1
40 TABLET, DELAYED RELEASE ORAL 2 TIMES DAILY
Qty: 180 TABLET | Refills: 0 | Status: SHIPPED | OUTPATIENT
Start: 2019-01-10 | End: 2019-05-06 | Stop reason: SDUPTHER

## 2019-01-10 RX ORDER — PROPOFOL 10 MG/ML
INJECTION, EMULSION INTRAVENOUS CONTINUOUS PRN
Status: DISCONTINUED | OUTPATIENT
Start: 2019-01-10 | End: 2019-01-10 | Stop reason: SURG

## 2019-01-10 RX ORDER — FENTANYL CITRATE/PF 50 MCG/ML
50 SYRINGE (ML) INJECTION
Status: COMPLETED | OUTPATIENT
Start: 2019-01-10 | End: 2019-01-10

## 2019-01-10 RX ORDER — DEXAMETHASONE SODIUM PHOSPHATE 10 MG/ML
INJECTION, SOLUTION INTRAMUSCULAR; INTRAVENOUS AS NEEDED
Status: DISCONTINUED | OUTPATIENT
Start: 2019-01-10 | End: 2019-01-10 | Stop reason: HOSPADM

## 2019-01-10 RX ORDER — HYDROMORPHONE HCL/PF 1 MG/ML
0.5 SYRINGE (ML) INJECTION
Status: COMPLETED | OUTPATIENT
Start: 2019-01-10 | End: 2019-01-10

## 2019-01-10 RX ORDER — ALBUTEROL SULFATE 90 UG/1
2 AEROSOL, METERED RESPIRATORY (INHALATION) EVERY 6 HOURS PRN
Qty: 3 INHALER | Refills: 0 | Status: SHIPPED | OUTPATIENT
Start: 2019-01-10 | End: 2019-09-10 | Stop reason: SDUPTHER

## 2019-01-10 RX ORDER — ONDANSETRON 2 MG/ML
4 INJECTION INTRAMUSCULAR; INTRAVENOUS ONCE AS NEEDED
Status: DISCONTINUED | OUTPATIENT
Start: 2019-01-10 | End: 2019-01-10 | Stop reason: HOSPADM

## 2019-01-10 RX ORDER — FENTANYL CITRATE 50 UG/ML
INJECTION, SOLUTION INTRAMUSCULAR; INTRAVENOUS AS NEEDED
Status: DISCONTINUED | OUTPATIENT
Start: 2019-01-10 | End: 2019-01-10 | Stop reason: SURG

## 2019-01-10 RX ORDER — PREGABALIN 100 MG/1
100 CAPSULE ORAL 3 TIMES DAILY
Qty: 90 CAPSULE | Refills: 0 | Status: SHIPPED | OUTPATIENT
Start: 2019-01-10 | End: 2019-01-10 | Stop reason: SDUPTHER

## 2019-01-10 RX ORDER — LIDOCAINE HYDROCHLORIDE 20 MG/ML
INJECTION, SOLUTION INFILTRATION; PERINEURAL AS NEEDED
Status: DISCONTINUED | OUTPATIENT
Start: 2019-01-10 | End: 2019-01-10 | Stop reason: HOSPADM

## 2019-01-10 RX ORDER — PROPOFOL 10 MG/ML
INJECTION, EMULSION INTRAVENOUS AS NEEDED
Status: DISCONTINUED | OUTPATIENT
Start: 2019-01-10 | End: 2019-01-10 | Stop reason: SURG

## 2019-01-10 RX ORDER — ONDANSETRON 4 MG/1
8 TABLET, ORALLY DISINTEGRATING ORAL EVERY 8 HOURS SCHEDULED
Qty: 30 TABLET | Refills: 0 | Status: SHIPPED | OUTPATIENT
Start: 2019-01-10 | End: 2019-06-04 | Stop reason: SDUPTHER

## 2019-01-10 RX ORDER — BUPIVACAINE HYDROCHLORIDE 2.5 MG/ML
INJECTION, SOLUTION INFILTRATION; PERINEURAL AS NEEDED
Status: DISCONTINUED | OUTPATIENT
Start: 2019-01-10 | End: 2019-01-10 | Stop reason: HOSPADM

## 2019-01-10 RX ORDER — PREGABALIN 100 MG/1
100 CAPSULE ORAL 3 TIMES DAILY
Qty: 90 CAPSULE | Refills: 0 | Status: SHIPPED | OUTPATIENT
Start: 2019-01-10 | End: 2019-03-21 | Stop reason: SDUPTHER

## 2019-01-10 RX ORDER — ROSUVASTATIN CALCIUM 40 MG/1
40 TABLET, COATED ORAL DAILY
Qty: 90 TABLET | Refills: 0 | Status: SHIPPED | OUTPATIENT
Start: 2019-01-10 | End: 2019-01-10 | Stop reason: SDUPTHER

## 2019-01-10 RX ORDER — 0.9 % SODIUM CHLORIDE 0.9 %
VIAL (ML) INJECTION AS NEEDED
Status: DISCONTINUED | OUTPATIENT
Start: 2019-01-10 | End: 2019-01-10 | Stop reason: HOSPADM

## 2019-01-10 RX ORDER — MIDAZOLAM HYDROCHLORIDE 1 MG/ML
INJECTION INTRAMUSCULAR; INTRAVENOUS AS NEEDED
Status: DISCONTINUED | OUTPATIENT
Start: 2019-01-10 | End: 2019-01-10 | Stop reason: SURG

## 2019-01-10 RX ORDER — FENOFIBRIC ACID 135 MG/1
1 CAPSULE, DELAYED RELEASE ORAL DAILY
Qty: 90 CAPSULE | Refills: 0 | Status: SHIPPED | OUTPATIENT
Start: 2019-01-10 | End: 2019-05-06 | Stop reason: SDUPTHER

## 2019-01-10 RX ORDER — ONDANSETRON 2 MG/ML
INJECTION INTRAMUSCULAR; INTRAVENOUS AS NEEDED
Status: DISCONTINUED | OUTPATIENT
Start: 2019-01-10 | End: 2019-01-10 | Stop reason: SURG

## 2019-01-10 RX ORDER — ROSUVASTATIN CALCIUM 40 MG/1
40 TABLET, COATED ORAL DAILY
Qty: 90 TABLET | Refills: 0 | Status: SHIPPED | OUTPATIENT
Start: 2019-01-10 | End: 2019-05-06 | Stop reason: SDUPTHER

## 2019-01-10 RX ORDER — OMEGA-3-ACID ETHYL ESTERS 1 G/1
2 CAPSULE, LIQUID FILLED ORAL 2 TIMES DAILY
Qty: 360 CAPSULE | Refills: 3 | Status: SHIPPED | OUTPATIENT
Start: 2019-01-10 | End: 2019-05-06 | Stop reason: SDUPTHER

## 2019-01-10 RX ORDER — FENOFIBRIC ACID 135 MG/1
1 CAPSULE, DELAYED RELEASE ORAL DAILY
Qty: 90 CAPSULE | Refills: 1 | Status: SHIPPED | OUTPATIENT
Start: 2019-01-10 | End: 2019-01-10 | Stop reason: SDUPTHER

## 2019-01-10 RX ORDER — PANTOPRAZOLE SODIUM 40 MG/1
40 TABLET, DELAYED RELEASE ORAL 2 TIMES DAILY
Qty: 180 TABLET | Refills: 0 | Status: SHIPPED | OUTPATIENT
Start: 2019-01-10 | End: 2019-01-10 | Stop reason: SDUPTHER

## 2019-01-10 RX ORDER — SODIUM CHLORIDE, SODIUM LACTATE, POTASSIUM CHLORIDE, CALCIUM CHLORIDE 600; 310; 30; 20 MG/100ML; MG/100ML; MG/100ML; MG/100ML
INJECTION, SOLUTION INTRAVENOUS CONTINUOUS PRN
Status: DISCONTINUED | OUTPATIENT
Start: 2019-01-10 | End: 2019-01-10 | Stop reason: SURG

## 2019-01-10 RX ADMIN — FENTANYL CITRATE 50 MCG: 50 INJECTION, SOLUTION INTRAMUSCULAR; INTRAVENOUS at 09:00

## 2019-01-10 RX ADMIN — PROPOFOL 50 MG: 10 INJECTION, EMULSION INTRAVENOUS at 08:55

## 2019-01-10 RX ADMIN — SODIUM CHLORIDE, SODIUM LACTATE, POTASSIUM CHLORIDE, AND CALCIUM CHLORIDE: .6; .31; .03; .02 INJECTION, SOLUTION INTRAVENOUS at 08:47

## 2019-01-10 RX ADMIN — FENTANYL CITRATE 50 MCG: 50 INJECTION, SOLUTION INTRAMUSCULAR; INTRAVENOUS at 09:29

## 2019-01-10 RX ADMIN — PROPOFOL 50 MG: 10 INJECTION, EMULSION INTRAVENOUS at 08:57

## 2019-01-10 RX ADMIN — PROPOFOL 50 MG: 10 INJECTION, EMULSION INTRAVENOUS at 09:04

## 2019-01-10 RX ADMIN — FENTANYL CITRATE 50 MCG: 50 INJECTION, SOLUTION INTRAMUSCULAR; INTRAVENOUS at 08:51

## 2019-01-10 RX ADMIN — PROPOFOL 100 MCG/KG/MIN: 10 INJECTION, EMULSION INTRAVENOUS at 08:55

## 2019-01-10 RX ADMIN — LIDOCAINE HYDROCHLORIDE 100 MG: 20 INJECTION, SOLUTION INTRAVENOUS at 08:54

## 2019-01-10 RX ADMIN — FENTANYL CITRATE 50 MCG: 50 INJECTION, SOLUTION INTRAMUSCULAR; INTRAVENOUS at 09:38

## 2019-01-10 RX ADMIN — HYDROMORPHONE HYDROCHLORIDE 0.5 MG: 1 INJECTION, SOLUTION INTRAMUSCULAR; INTRAVENOUS; SUBCUTANEOUS at 09:48

## 2019-01-10 RX ADMIN — PROPOFOL 50 MG: 10 INJECTION, EMULSION INTRAVENOUS at 09:03

## 2019-01-10 RX ADMIN — MIDAZOLAM HYDROCHLORIDE 2 MG: 1 INJECTION, SOLUTION INTRAMUSCULAR; INTRAVENOUS at 08:46

## 2019-01-10 RX ADMIN — HYDROMORPHONE HYDROCHLORIDE 0.5 MG: 1 INJECTION, SOLUTION INTRAMUSCULAR; INTRAVENOUS; SUBCUTANEOUS at 09:54

## 2019-01-10 RX ADMIN — ONDANSETRON 4 MG: 2 INJECTION INTRAMUSCULAR; INTRAVENOUS at 09:12

## 2019-01-10 NOTE — TELEPHONE ENCOUNTER
From: Baljit Urrutia  Sent: 1/10/2019 1:46 PM EST  Subject: Medication Renewal Request    Baljit Hendersonmicah would like a refill of the following medications:     omega-3-acid ethyl esters (LOVAZA) 1 g capsule LUANA Benitez]    Preferred pharmacy: [facility]        Medication renewals requested in this message routed separately:     Pancrelipase, Lip-Prot-Amyl, (CREON) 81072 units CPEP Rubin Li PA-C]

## 2019-01-10 NOTE — DISCHARGE INSTRUCTIONS
Pancreatitis   WHAT YOU NEED TO KNOW:   Pancreatitis is inflammation of your pancreas  The pancreas is an organ that makes insulin  It also makes enzymes (digestive juices) that help your body digest food  Pancreatitis may be an acute (short-term) problem that happens only once  It may become a chronic (long-term) problem that comes and goes over time  DISCHARGE INSTRUCTIONS:   Seek care immediately if:   · You have severe pain in your abdomen and you are vomiting  Contact your healthcare provider if:   · You have a fever  · You continue to lose weight without trying  · Your skin or the whites of your eyes turn yellow  · You have questions or concerns about your condition or care  Medicines: You may need any of the following:  · Antibiotics  treat a bacterial infection  · Prescription pain medicine  may be given  Ask your healthcare provider how to take this medicine safely  Some prescription pain medicines contain acetaminophen  Do not take other medicines that contain acetaminophen without talking to your healthcare provider  Too much acetaminophen may cause liver damage  Prescription pain medicine may cause constipation  Ask your healthcare provider how to prevent or treat constipation  · Take your medicine as directed  Contact your healthcare provider if you think your medicine is not helping or if you have side effects  Tell him or her if you are allergic to any medicine  Keep a list of the medicines, vitamins, and herbs you take  Include the amounts, and when and why you take them  Bring the list or the pill bottles to follow-up visits  Carry your medicine list with you in case of an emergency  Self-care:   · Rest  when you feel it is needed  Slowly start to do more each day  Return to your usual activities as directed  · Do not drink any alcohol    If you need help to stop drinking, contact the following organization:   ¨ Alcoholics Anonymous  Web Address: http://www [employer]/      · Ask your healthcare provider or dietitian about the best foods to eat  You may need to eat foods that are low in fat if you have chronic pancreatitis  Follow up with your healthcare provider as directed:  Write down your questions so you remember to ask them during your visits  © 2017 [employer] Information is for End User's use only and may not be sold, redistributed or otherwise used for commercial purposes  All illustrations and images included in CareNotes® are the copyrighted property of [employer]  or [employer]  The above information is an  only  It is not intended as medical advice for individual conditions or treatments  Talk to your doctor, nurse or pharmacist before following any medical regimen to see if it is safe and effective for you  Sympathetic Nerve Block   WHAT YOU NEED TO KNOW:   A sympathetic nerve block is an injection of anesthesia medicine around peripheral nerves near your spine  You may need a sympathetic nerve block to locate an area of pain or to relieve nerve pain  It may be used to help your healthcare provider guide your treatment  You may need a series of injections if your pain or condition improves after the first   DISCHARGE INSTRUCTIONS:   Call 911 if:   · You have a seizure  · You have trouble breathing  Seek care immediately if:   · You develop hives or swelling  · You have severe pain  Contact your healthcare provider if:   · You are dizzy or have ringing in your ears  · You feel numbness or tingling around your mouth  · You have numbness or weakness in a limb 8 hours or more after your procedure  · You continue to bleed at the site of the needle insertion  · You have questions or concerns about sympathetic nerve blocks  Self-care:   · Decrease your activity the day of your procedure  Do not return to work until your healthcare provider says it is okay      · Be careful if your leg is numb  Your leg may not be able to support your weight  Do not drive until numbness is gone  Follow up with your healthcare provider as directed:  Bring your symptom record to your follow-up visits  Write down your questions so you remember to ask them during your visits  © 2017 [employer] Information is for End User's use only and may not be sold, redistributed or otherwise used for commercial purposes  All illustrations and images included in CareNotes® are the copyrighted property of [employer]  or [employer]  The above information is an  only  It is not intended as medical advice for individual conditions or treatments  Talk to your doctor, nurse or pharmacist before following any medical regimen to see if it is safe and effective for you

## 2019-01-10 NOTE — OP NOTE
OPERATIVE REPORT  PATIENT NAME: Leny Basilio    :  1975  MRN: [id number]  Pt Location: [facility]    SURGERY DATE: 1/10/2019    Surgeon(s) and Role:     * Damon Fenton MD - Primary    Preop Diagnosis:  Other chronic pancreatitis ([facility] [address] ) [K86 1]    Post-Op Diagnosis Codes:     * Other chronic pancreatitis ([facility] [address] ) [K86 1]    Procedure(s) (LRB):  SPLANCHNIC NERVE BLOCK (Bilateral)    Specimen(s):  * No specimens in log *    Estimated Blood Loss:   Minimal    Drains:       Anesthesia Type:   IV Sedation with Anesthesia    Operative Indications: Other chronic pancreatitis (HCC) [K86 1]    Operative Findings:  None     Complications:   None    Procedure and Technique:  MEDICATIONS INJECTED: 12 mL of bupivacaine 0 25% (6 mL per side) plus 20mg of dexamethasone (10mg per side) in 4cc of sterile saline  LOCAL ANESTHETIC USED: 5 mL of 2% lidocaine  ESTIMATED BLOOD LOSS: None  COMPLICATIONS: None      TECHNIQUE: Time-out was taken to identify the correct patient, procedure and side prior to starting the procedure  With the patient lying prone, the area to be injected was widely prepped and draped in the usual sterile fashion using DuraPrep and a drape  The anatomical target site was determined using fluoroscopy by squaring off the superior endplate of H66, ipsilaterally obliquing the C-arm intensifier until the tip of the T12 transverse process was at the anterolateral border of the T12 vertebral body, and then moving the C-arm intensifier caudal to move the Transverse process out of the fluoroscopic target view  Local anesthetic was given by raising a skin wheal and going down to the hub of the 27-gauge 1 25-inch needle  A 22-gauge 3 5-inch Quincke needle was advanced at the midbody of T12 to a point in the anterior one-third of the T12 vertebral body utilizing A-P and lateral intermittent fluoroscopy   3 mL of Omnipaque 300 contrast was injected to show unilateral spread along the anterolateral surface of the T12 vertebral body, and no vascular uptake       The above technique was then repeated for the opposite side with contrast injected again to show unilateral spread superior and inferior along the anterolateral T12 vertebral body  Medication was then injected slowly in 3 mL increments after negative aspirations of the needle to confirm the needle had not slipped intravascular       The procedure was completed without complications and was tolerated well  The patient was monitored after the procedure  The patient and his wife were given post-procedure and discharge instructions to follow at home  The patient was discharged in stable condition   The patient will call the office to make a follow up appointment in 4 weeks       I was present for the entire procedure    Patient Disposition:  PACU     SIGNATURE: Emilie Escobedo MD  DATE: January 10, 2019  TIME: 9:21 AM

## 2019-01-10 NOTE — TELEPHONE ENCOUNTER
From: Buffy Taylor  Sent: 1/10/2019 1:46 PM EST  Subject: Medication Renewal Request    Buffy Taylor would like a refill of the following medications:     ondansetron (ZOFRAN-ODT) 4 mg disintegrating tablet LUANA [facility]]    Preferred pharmacy: [id number]        Medication renewals requested in this message routed separately:     pregabalin (LYRICA) 100 mg capsule Jason Davis MD]

## 2019-01-10 NOTE — ANESTHESIA PREPROCEDURE EVALUATION
Review of Systems/Medical History  Patient summary reviewed  Chart reviewed  No history of anesthetic complications     Cardiovascular  Exercise tolerance (METS): >4,  Hyperlipidemia,    Pulmonary  Pneumonia, Asthma , PRN med  controlled Last rescue: < 1 week ago Asthma type of rescue: PRN inhaler,        GI/Hepatic    GERD well controlled,             Endo/Other     GYN       Hematology   Musculoskeletal       Neurology   Psychology           Physical Exam    Airway    Mallampati score: II  TM Distance: >3 FB  Neck ROM: full     Dental   No notable dental hx     Cardiovascular      Pulmonary      Other Findings        Anesthesia Plan  ASA Score- 3     Anesthesia Type- IV sedation with anesthesia with ASA Monitors  Additional Monitors:   Airway Plan:         Plan Factors-    Induction- intravenous  Postoperative Plan-     Informed Consent- Anesthetic plan and risks discussed with patient  I personally reviewed this patient with the CRNA  Discussed and agreed on the Anesthesia Plan with the CRNA  Elysia Bell

## 2019-01-10 NOTE — H&P (VIEW-ONLY)
Assessment:  1  Other chronic pancreatitis (HCC)  FL spine and pain procedure     Plan: This is a 77-year-old male who presents today for follow-up office visit for management of chronic abdominal pain due to chronic pancreatitis  We have performed a series of bilateral splanchnic nerve blocks which has provided relief of his pain symptoms for 2-3 months duration  Patient is 6 weeks postop  Patient has returned back to work as he was feeling better  Pain is gradually returning, with moderate intensity  I advised patient that we will schedule a repeat bilateral splanchnic nerve block as his pain is becoming more progressive and constant  Patient verbalized understanding  Complete risks and benefits including bleeding, infection, tissue reaction, nerve injury and allergic reaction were discussed  The approach was demonstrated using models and literature was provided  Verbal and written consent was obtained  My impressions and treatment recommendations were discussed in detail with the patient who verbalized understanding and had no further questions  Discharge instructions were provided  I personally saw and examined the patient and I agree with the above discussed plan of care  History of Present Illness:  Cornelio Rincon is a 37 y o  male who presents for a follow up office visit in regards to Abdominal Pain  The patients current symptoms include chronic abdominal pain from chronic pancreatitis  Patient had a bilateral splanchnic nerve block on October 29, 2018  Patient reports greater than 50% pain relief  Pain relief remains ongoing  Patient is here today for routine follow-up  Patient reports that he had gone back to work and his symptoms where bit aggravated  Pain is 5/10  He is on lyrica 100mg po BID      I have personally reviewed and/or updated the patient's past medical history, past surgical history, family history, social history, current medications, allergies, and vital signs today      Review of Systems   Respiratory: Negative for shortness of breath  Cardiovascular: Negative for chest pain  Gastrointestinal: Negative for constipation, diarrhea, nausea and vomiting  Musculoskeletal: Negative for arthralgias, gait problem, joint swelling and myalgias  Skin: Negative for rash  Neurological: Negative for dizziness, seizures and weakness  All other systems reviewed and are negative  Patient Active Problem List   Diagnosis    Pancreatic lesion    Renal mass    Smoker    RBBB    Epigastric pain    Acute gastritis without hemorrhage    Abdominal pain    Abnormal transaminases    Acute on chronic pancreatitis (HCC)    Leukopenia    Hypertriglyceridemia    Chronic pain syndrome    Esophagitis    Other chronic pancreatitis (HCC)    Chronic gastritis without bleeding    Uncomplicated opioid dependence (HCC)    Long-term current use of opiate analgesic    GERD (gastroesophageal reflux disease)    Hyponatremia    Healthcare maintenance       Past Medical History:   Diagnosis Date    Asthma     Chronic pain disorder     GERD (gastroesophageal reflux disease)     Hyperlipidemia     Liver abscess     Meniscus tear     left knee work injury last assessed 08/24/2016    Pancreatitis     Pneumonia        Past Surgical History:   Procedure Laterality Date    CELIAC PLEXUS BLOCK Bilateral 10/29/2018    Procedure: SPLANCHNIC NERVE BLOCK;  Surgeon: Iris Robison MD;  [facility];  Service: Pain Management     CHOLECYSTECTOMY      ESOPHAGOGASTRODUODENOSCOPY N/A 11/16/2017    Procedure: ESOPHAGOGASTRODUODENOSCOPY (EGD); Surgeon: Harrold Buerger, MD;  [facility]; Service: Gastroenterology    ESOPHAGOGASTRODUODENOSCOPY N/A 4/19/2018    Procedure: ESOPHAGOGASTRODUODENOSCOPY (EGD); Surgeon: Oleksandr Ramirez MD;  [facility];   Service: Gastroenterology    ESOPHAGOGASTRODUODENOSCOPY N/A 5/10/2018    Procedure: ESOPHAGOGASTRODUODENOSCOPY (EGD); Surgeon: Gina Stubbs MD;  [facility];   Service: Gastroenterology    KNEE ARTHROSCOPY Bilateral     KNEE ARTHROSCOPY Right 2009    cibishino last assessed 08/24/2016   [facility] LIVER SURGERY      NERVE BLOCK Bilateral 5/29/2018    Procedure: SPLANCHNIC NERVE BLOCK;  Surgeon: Olman Reaves MD;  [facility];  Service: Pain Management     PANCREAS SURGERY      stents    ID INJECT NERV BLCK,CELIAC PLEXUS Bilateral 5/9/2017    Procedure: CELIAC PLEXUS BLOCK ;  Surgeon: Olman Reaves MD;  [facility];  Service: Pain Management     ID INJECT NERV BLCK,CELIAC PLEXUS Bilateral 6/1/2017    Procedure: SPLANCHNIC NERVE BLOCK at T12;  Surgeon: Olman Reaves MD;  [facility];  Service: Pain Management     ID INJECT NERV BLCK,CELIAC PLEXUS Bilateral 8/8/2017    Procedure: BILATERAL SPLANCHNIC NERVE BLOCK T12;  Surgeon: Olman Reaves MD;  [facility];  Service: Pain Management     ID INJECT NERV [facility] Bilateral 12/28/2017    Procedure: SPLANCHNIC NERVE BLOCK;  Surgeon: Olman Reaves MD;  [facility];  Service: Pain Management     ID LAP,CHOLECYSTECTOMY N/A 2/14/2017    Procedure: LAPAROSCOPIC CHOLECYSTECTOMY, IOC, POSSIBLE OPEN ;  Surgeon: Ted Belcher MD;  [facility];  Service: General    ROTATOR CUFF REPAIR Right     SHOULDER ARTHROSCOPY      SHOULDER ARTHROSCOPY Right        Family History   Problem Relation Age of Onset    Cirrhosis Mother     Heart disease Other         cardiac disorder    Cancer Other        Social History     Occupational History    fulltime employment      Social History Main Topics    Smoking status: Current Every Day Smoker     Packs/day: 0 50     Years: 20 00    Smokeless tobacco: Never Used    Alcohol use 0 6 oz/week     1 Cans of beer per week      Comment: rarely, occasionally, history of no alcohol use per allscripts    Drug use: No    Sexual activity: No       Current Outpatient Prescriptions on File Prior to Visit   Medication Sig    albuterol (VENTOLIN HFA) 90 mcg/act inhaler Inhale 2 puffs every 6 (six) hours as needed for wheezing Prn    Choline Fenofibrate (FENOFIBRIC ACID) 135 MG CPDR Take 1 capsule by mouth daily    EPINEPHrine (EPIPEN 2-ARIEL) 0 3 mg/0 3 mL SOAJ EpiPen 2-Ariel 0 3 MG/0 3ML Injection Solution Auto-injector  INJECT 0 3ML INTRAMUSCULARLY AS DIRECTED     Quantity: 1;  Refills: 1      Toro Briceño ; Active  2 Solution Auto-injector Pen    niacin (NIASPAN) 500 mg CR tablet Take 2 tablets (1,000 mg total) by mouth daily at bedtime    omega-3-acid ethyl esters (LOVAZA) 1 g capsule TAKE 2 CAPSULES TWICE A DAY    ondansetron (ZOFRAN-ODT) 4 mg disintegrating tablet DISSOLVE ONE TABLET IN MOUTH EVERY EIGHT HOURS AS NEEDED NAUSEA AND VOMITING     pancrelipase, Lip-Prot-Amyl, (CREON) 12,000 units capsule Take 12,000 units of lipase by mouth as needed for snacks    Pancrelipase, Lip-Prot-Amyl, (CREON) 87416 units CPEP Take 1 capsule (36,000 Units total) by mouth 3 (three) times a day before meals    pantoprazole (PROTONIX) 40 mg tablet TAKE 1 TABLET TWICE A DAY    pregabalin (LYRICA) 100 mg capsule Take 1 capsule (100 mg total) by mouth 3 (three) times a day for 30 days    ranitidine (ZANTAC) 150 MG capsule Take 150 mg by mouth 2 (two) times a day    rosuvastatin (CRESTOR) 40 MG tablet Take 1 tablet (40 mg total) by mouth daily    traZODone (DESYREL) 50 mg tablet Take 1 tablet (50 mg total) by mouth daily at bedtime (Patient taking differently: Take by mouth daily at bedtime  )     Current Facility-Administered Medications on File Prior to Visit   Medication    oxyCODONE-acetaminophen (PERCOCET) 5-325 mg per tablet 2 tablet       Allergies   Allergen Reactions    Bee Venom Swelling       Physical Exam:    /74   Pulse 74   Resp 16   Ht 5' 10" (1 778 m)   Wt 98 kg (216 lb)   BMI 30 99 kg/m²     Constitutional:normal, well developed, well nourished, alert, in no distress and non-toxic and no overt pain behavior    Eyes:anicteric  HEENT:grossly intact  Neck:supple, symmetric, trachea midline and no masses   Pulmonary:even and unlabored  Cardiovascular:No edema or pitting edema present  Skin:Normal without rashes or lesions and well hydrated  Psychiatric:Mood and affect appropriate  Neurologic:Cranial Nerves II-XII grossly intact  Musculoskeletal:normal    Imaging

## 2019-01-10 NOTE — ANESTHESIA POSTPROCEDURE EVALUATION
Post-Op Assessment Note      CV Status:  Stable    Mental Status:  Alert and awake    Hydration Status:  Euvolemic    PONV Controlled:  Controlled    Airway Patency:  Patent    Post Op Vitals Reviewed: Yes          Staff: CRNA, Anesthesiologist           /66 (01/10/19 0921)    Temp (!) 97 2 °F (36 2 °C) (01/10/19 0921)    Pulse 88 (01/10/19 0921)   Resp  18   SpO2   96%

## 2019-01-10 NOTE — TELEPHONE ENCOUNTER
From: Domsalvador Jam  Sent: 1/10/2019 1:46 PM EST  Subject: Medication Renewal Request    Ajay Polk would like a refill of the following medications:     Pancrelipase, Lip-Prot-Amyl, (CREON) 21993 units CPEP Olean Hodgkin, PA-C]    Preferred pharmacy: [facility]        Medication renewals requested in this message routed separately:     omega-3-acid ethyl esters (LOVAZA) 1 g capsule LUANA Cesar]

## 2019-01-13 DIAGNOSIS — K86.1 CHRONIC PANCREATITIS, UNSPECIFIED PANCREATITIS TYPE (HCC): ICD-10-CM

## 2019-01-14 RX ORDER — PANCRELIPASE 36000; 180000; 114000 [USP'U]/1; [USP'U]/1; [USP'U]/1
CAPSULE, DELAYED RELEASE PELLETS ORAL
Qty: 270 CAPSULE | Refills: 0 | Status: ON HOLD | OUTPATIENT
Start: 2019-01-14 | End: 2019-04-11 | Stop reason: SDUPTHER

## 2019-01-31 ENCOUNTER — DOCUMENTATION (OUTPATIENT)
Dept: GASTROENTEROLOGY | Facility: CLINIC | Age: 44
End: 2019-01-31

## 2019-02-27 ENCOUNTER — HOSPITAL ENCOUNTER (INPATIENT)
Facility: HOSPITAL | Age: 44
LOS: 3 days | Discharge: HOME | DRG: 642 | End: 2019-03-03
Attending: EMERGENCY MEDICINE | Admitting: INTERNAL MEDICINE
Payer: COMMERCIAL

## 2019-02-27 DIAGNOSIS — E78.1 HYPERTRIGLYCERIDEMIA: ICD-10-CM

## 2019-02-27 DIAGNOSIS — K85.90 ACUTE PANCREATITIS: Primary | ICD-10-CM

## 2019-02-27 PROCEDURE — 99285 EMERGENCY DEPT VISIT HI MDM: CPT

## 2019-02-28 ENCOUNTER — TELEPHONE (OUTPATIENT)
Dept: GASTROENTEROLOGY | Facility: MEDICAL CENTER | Age: 44
End: 2019-02-28

## 2019-02-28 ENCOUNTER — APPOINTMENT (EMERGENCY)
Dept: CT IMAGING | Facility: HOSPITAL | Age: 44
DRG: 642 | End: 2019-02-28
Payer: COMMERCIAL

## 2019-02-28 ENCOUNTER — APPOINTMENT (INPATIENT)
Dept: INTERVENTIONAL RADIOLOGY/VASCULAR | Facility: HOSPITAL | Age: 44
DRG: 642 | End: 2019-02-28
Payer: COMMERCIAL

## 2019-02-28 PROBLEM — R03.0 ELEVATED BLOOD PRESSURE READING: Status: ACTIVE | Noted: 2019-02-28

## 2019-02-28 LAB
ALBUMIN SERPL BCP-MCNC: 3.4 G/DL (ref 3.5–5)
ALBUMIN SERPL BCP-MCNC: 3.5 G/DL (ref 3.5–5)
ALP SERPL-CCNC: 101 U/L (ref 46–116)
ALP SERPL-CCNC: 109 U/L (ref 46–116)
ALT SERPL W P-5'-P-CCNC: 610 U/L (ref 12–78)
ALT SERPL W P-5'-P-CCNC: 677 U/L (ref 12–78)
ANION GAP SERPL CALCULATED.3IONS-SCNC: 11 MMOL/L (ref 4–13)
ANION GAP SERPL CALCULATED.3IONS-SCNC: 9 MMOL/L (ref 4–13)
AST SERPL W P-5'-P-CCNC: 396 U/L (ref 5–45)
AST SERPL W P-5'-P-CCNC: 511 U/L (ref 5–45)
BASOPHILS # BLD AUTO: 0.02 THOUSANDS/ΜL (ref 0–0.1)
BASOPHILS NFR BLD AUTO: 1 % (ref 0–1)
BILIRUB DIRECT SERPL-MCNC: 0.07 MG/DL (ref 0–0.2)
BILIRUB SERPL-MCNC: 0.7 MG/DL (ref 0.2–1)
BILIRUB SERPL-MCNC: 0.8 MG/DL (ref 0.2–1)
BUN SERPL-MCNC: 10 MG/DL (ref 5–25)
BUN SERPL-MCNC: 10 MG/DL (ref 5–25)
CALCIUM SERPL-MCNC: 8.6 MG/DL (ref 8.3–10.1)
CALCIUM SERPL-MCNC: 9.2 MG/DL (ref 8.3–10.1)
CHLORIDE SERPL-SCNC: 102 MMOL/L (ref 100–108)
CHLORIDE SERPL-SCNC: 103 MMOL/L (ref 100–108)
CO2 SERPL-SCNC: 23 MMOL/L (ref 21–32)
CO2 SERPL-SCNC: 26 MMOL/L (ref 21–32)
CREAT SERPL-MCNC: 0.62 MG/DL (ref 0.6–1.3)
CREAT SERPL-MCNC: 0.75 MG/DL (ref 0.6–1.3)
EOSINOPHIL # BLD AUTO: 0.08 THOUSAND/ΜL (ref 0–0.61)
EOSINOPHIL NFR BLD AUTO: 2 % (ref 0–6)
ERYTHROCYTE [DISTWIDTH] IN BLOOD BY AUTOMATED COUNT: 12.3 % (ref 11.6–15.1)
ERYTHROCYTE [DISTWIDTH] IN BLOOD BY AUTOMATED COUNT: 12.3 % (ref 11.6–15.1)
GFR SERPL CREATININE-BSD FRML MDRD: 111 ML/MIN/1.73SQ M
GFR SERPL CREATININE-BSD FRML MDRD: 120 ML/MIN/1.73SQ M
GLUCOSE SERPL-MCNC: 133 MG/DL (ref 65–140)
GLUCOSE SERPL-MCNC: 149 MG/DL (ref 65–140)
HCT VFR BLD AUTO: 40 % (ref 36.5–49.3)
HCT VFR BLD AUTO: 41.1 % (ref 36.5–49.3)
HGB BLD-MCNC: 14.3 G/DL (ref 12–17)
HGB BLD-MCNC: 15.3 G/DL (ref 12–17)
IMM GRANULOCYTES # BLD AUTO: 0.03 THOUSAND/UL (ref 0–0.2)
IMM GRANULOCYTES NFR BLD AUTO: 1 % (ref 0–2)
INR PPP: 0.94 (ref 0.86–1.17)
LIPASE SERPL-CCNC: 267 U/L (ref 73–393)
LIPASE SERPL-CCNC: 461 U/L (ref 73–393)
LYMPHOCYTES # BLD AUTO: 1.28 THOUSANDS/ΜL (ref 0.6–4.47)
LYMPHOCYTES NFR BLD AUTO: 31 % (ref 14–44)
MAGNESIUM SERPL-MCNC: 1.8 MG/DL (ref 1.6–2.6)
MAGNESIUM SERPL-MCNC: 1.9 MG/DL (ref 1.6–2.6)
MCH RBC QN AUTO: 33.4 PG (ref 26.8–34.3)
MCH RBC QN AUTO: 34.9 PG (ref 26.8–34.3)
MCHC RBC AUTO-ENTMCNC: 35.8 G/DL (ref 31.4–37.4)
MCHC RBC AUTO-ENTMCNC: 37.2 G/DL (ref 31.4–37.4)
MCV RBC AUTO: 94 FL (ref 82–98)
MCV RBC AUTO: 94 FL (ref 82–98)
MONOCYTES # BLD AUTO: 0.38 THOUSAND/ΜL (ref 0.17–1.22)
MONOCYTES NFR BLD AUTO: 9 % (ref 4–12)
NEUTROPHILS # BLD AUTO: 2.3 THOUSANDS/ΜL (ref 1.85–7.62)
NEUTS SEG NFR BLD AUTO: 56 % (ref 43–75)
NRBC BLD AUTO-RTO: 0 /100 WBCS
PHOSPHATE SERPL-MCNC: 2.9 MG/DL (ref 2.7–4.5)
PLATELET # BLD AUTO: 124 THOUSANDS/UL (ref 149–390)
PLATELET # BLD AUTO: 125 THOUSANDS/UL (ref 149–390)
PMV BLD AUTO: 10.6 FL (ref 8.9–12.7)
PMV BLD AUTO: 11.3 FL (ref 8.9–12.7)
POTASSIUM SERPL-SCNC: 4.5 MMOL/L (ref 3.5–5.3)
POTASSIUM SERPL-SCNC: 4.6 MMOL/L (ref 3.5–5.3)
PROT SERPL-MCNC: 6.9 G/DL (ref 6.4–8.2)
PROT SERPL-MCNC: 6.9 G/DL (ref 6.4–8.2)
PROTHROMBIN TIME: 12.5 SECONDS (ref 11.8–14.2)
RBC # BLD AUTO: 4.28 MILLION/UL (ref 3.88–5.62)
RBC # BLD AUTO: 4.38 MILLION/UL (ref 3.88–5.62)
SODIUM SERPL-SCNC: 136 MMOL/L (ref 136–145)
SODIUM SERPL-SCNC: 138 MMOL/L (ref 136–145)
TRIGL SERPL-MCNC: 2626 MG/DL
TRIGL SERPL-MCNC: 493 MG/DL
TRIGL SERPL-MCNC: >4000 MG/DL
WBC # BLD AUTO: 4.09 THOUSAND/UL (ref 4.31–10.16)
WBC # BLD AUTO: 6.34 THOUSAND/UL (ref 4.31–10.16)

## 2019-02-28 PROCEDURE — 02H633Z INSERTION OF INFUSION DEVICE INTO RIGHT ATRIUM, PERCUTANEOUS APPROACH: ICD-10-PCS | Performed by: RADIOLOGY

## 2019-02-28 PROCEDURE — 76937 US GUIDE VASCULAR ACCESS: CPT | Performed by: RADIOLOGY

## 2019-02-28 PROCEDURE — 83690 ASSAY OF LIPASE: CPT | Performed by: PHYSICIAN ASSISTANT

## 2019-02-28 PROCEDURE — 6A550Z3 PHERESIS OF PLASMA, SINGLE: ICD-10-PCS | Performed by: INTERNAL MEDICINE

## 2019-02-28 PROCEDURE — 85025 COMPLETE CBC W/AUTO DIFF WBC: CPT | Performed by: EMERGENCY MEDICINE

## 2019-02-28 PROCEDURE — 99254 IP/OBS CNSLTJ NEW/EST MOD 60: CPT | Performed by: INTERNAL MEDICINE

## 2019-02-28 PROCEDURE — 83690 ASSAY OF LIPASE: CPT | Performed by: EMERGENCY MEDICINE

## 2019-02-28 PROCEDURE — 96361 HYDRATE IV INFUSION ADD-ON: CPT

## 2019-02-28 PROCEDURE — 77001 FLUOROGUIDE FOR VEIN DEVICE: CPT | Performed by: RADIOLOGY

## 2019-02-28 PROCEDURE — 74177 CT ABD & PELVIS W/CONTRAST: CPT

## 2019-02-28 PROCEDURE — 80048 BASIC METABOLIC PNL TOTAL CA: CPT | Performed by: EMERGENCY MEDICINE

## 2019-02-28 PROCEDURE — 36556 INSERT NON-TUNNEL CV CATH: CPT | Performed by: RADIOLOGY

## 2019-02-28 PROCEDURE — 99223 1ST HOSP IP/OBS HIGH 75: CPT | Performed by: INTERNAL MEDICINE

## 2019-02-28 PROCEDURE — 84478 ASSAY OF TRIGLYCERIDES: CPT | Performed by: EMERGENCY MEDICINE

## 2019-02-28 PROCEDURE — 83735 ASSAY OF MAGNESIUM: CPT | Performed by: PHYSICIAN ASSISTANT

## 2019-02-28 PROCEDURE — 84100 ASSAY OF PHOSPHORUS: CPT | Performed by: PHYSICIAN ASSISTANT

## 2019-02-28 PROCEDURE — 36415 COLL VENOUS BLD VENIPUNCTURE: CPT | Performed by: EMERGENCY MEDICINE

## 2019-02-28 PROCEDURE — 85027 COMPLETE CBC AUTOMATED: CPT | Performed by: PHYSICIAN ASSISTANT

## 2019-02-28 PROCEDURE — 80076 HEPATIC FUNCTION PANEL: CPT | Performed by: EMERGENCY MEDICINE

## 2019-02-28 PROCEDURE — 96374 THER/PROPH/DIAG INJ IV PUSH: CPT

## 2019-02-28 PROCEDURE — 76937 US GUIDE VASCULAR ACCESS: CPT

## 2019-02-28 PROCEDURE — 36556 INSERT NON-TUNNEL CV CATH: CPT

## 2019-02-28 PROCEDURE — C9113 INJ PANTOPRAZOLE SODIUM, VIA: HCPCS | Performed by: PHYSICIAN ASSISTANT

## 2019-02-28 PROCEDURE — 77001 FLUOROGUIDE FOR VEIN DEVICE: CPT

## 2019-02-28 PROCEDURE — 84478 ASSAY OF TRIGLYCERIDES: CPT | Performed by: PHYSICIAN ASSISTANT

## 2019-02-28 PROCEDURE — 80053 COMPREHEN METABOLIC PANEL: CPT | Performed by: PHYSICIAN ASSISTANT

## 2019-02-28 PROCEDURE — 85610 PROTHROMBIN TIME: CPT | Performed by: PHYSICIAN ASSISTANT

## 2019-02-28 RX ORDER — HYDROMORPHONE HCL/PF 1 MG/ML
1 SYRINGE (ML) INJECTION EVERY 4 HOURS PRN
Status: DISCONTINUED | OUTPATIENT
Start: 2019-02-28 | End: 2019-02-28

## 2019-02-28 RX ORDER — MORPHINE SULFATE 10 MG/ML
8 INJECTION, SOLUTION INTRAMUSCULAR; INTRAVENOUS ONCE
Status: COMPLETED | OUTPATIENT
Start: 2019-02-28 | End: 2019-02-28

## 2019-02-28 RX ORDER — SODIUM CHLORIDE 9 MG/ML
200 INJECTION, SOLUTION INTRAVENOUS CONTINUOUS
Status: DISCONTINUED | OUTPATIENT
Start: 2019-02-28 | End: 2019-02-28

## 2019-02-28 RX ORDER — PANTOPRAZOLE SODIUM 40 MG/1
40 INJECTION, POWDER, FOR SOLUTION INTRAVENOUS
Status: DISCONTINUED | OUTPATIENT
Start: 2019-02-28 | End: 2019-03-01

## 2019-02-28 RX ORDER — ALBUMIN, HUMAN INJ 5% 5 %
187.5 SOLUTION INTRAVENOUS ONCE
Status: DISCONTINUED | OUTPATIENT
Start: 2019-02-28 | End: 2019-02-28

## 2019-02-28 RX ORDER — ONDANSETRON 2 MG/ML
4 INJECTION INTRAMUSCULAR; INTRAVENOUS EVERY 6 HOURS PRN
Status: DISCONTINUED | OUTPATIENT
Start: 2019-02-28 | End: 2019-03-03 | Stop reason: HOSPADM

## 2019-02-28 RX ORDER — SODIUM CHLORIDE, SODIUM LACTATE, POTASSIUM CHLORIDE, CALCIUM CHLORIDE 600; 310; 30; 20 MG/100ML; MG/100ML; MG/100ML; MG/100ML
250 INJECTION, SOLUTION INTRAVENOUS CONTINUOUS
Status: DISCONTINUED | OUTPATIENT
Start: 2019-02-28 | End: 2019-03-03 | Stop reason: HOSPADM

## 2019-02-28 RX ORDER — LIDOCAINE HYDROCHLORIDE 10 MG/ML
INJECTION, SOLUTION INFILTRATION; PERINEURAL CODE/TRAUMA/SEDATION MEDICATION
Status: COMPLETED | OUTPATIENT
Start: 2019-02-28 | End: 2019-02-28

## 2019-02-28 RX ORDER — ALBUTEROL SULFATE 90 UG/1
2 AEROSOL, METERED RESPIRATORY (INHALATION) EVERY 6 HOURS PRN
Status: DISCONTINUED | OUTPATIENT
Start: 2019-02-28 | End: 2019-03-02

## 2019-02-28 RX ORDER — NICOTINE 21 MG/24HR
1 PATCH, TRANSDERMAL 24 HOURS TRANSDERMAL DAILY
Status: DISCONTINUED | OUTPATIENT
Start: 2019-02-28 | End: 2019-03-03 | Stop reason: HOSPADM

## 2019-02-28 RX ORDER — MORPHINE SULFATE 10 MG/ML
6 INJECTION, SOLUTION INTRAMUSCULAR; INTRAVENOUS ONCE
Status: COMPLETED | OUTPATIENT
Start: 2019-02-28 | End: 2019-02-28

## 2019-02-28 RX ORDER — HYDROMORPHONE HCL/PF 1 MG/ML
1 SYRINGE (ML) INJECTION ONCE
Status: COMPLETED | OUTPATIENT
Start: 2019-02-28 | End: 2019-02-28

## 2019-02-28 RX ORDER — FENTANYL CITRATE 50 UG/ML
INJECTION, SOLUTION INTRAMUSCULAR; INTRAVENOUS CODE/TRAUMA/SEDATION MEDICATION
Status: COMPLETED | OUTPATIENT
Start: 2019-02-28 | End: 2019-02-28

## 2019-02-28 RX ORDER — ALBUMIN, HUMAN INJ 5% 5 %
187.5 SOLUTION INTRAVENOUS ONCE
Status: COMPLETED | OUTPATIENT
Start: 2019-02-28 | End: 2019-02-28

## 2019-02-28 RX ORDER — HYDRALAZINE HYDROCHLORIDE 20 MG/ML
5 INJECTION INTRAMUSCULAR; INTRAVENOUS EVERY 6 HOURS PRN
Status: DISCONTINUED | OUTPATIENT
Start: 2019-02-28 | End: 2019-03-03 | Stop reason: HOSPADM

## 2019-02-28 RX ORDER — HYDROMORPHONE HCL/PF 1 MG/ML
1 SYRINGE (ML) INJECTION
Status: DISCONTINUED | OUTPATIENT
Start: 2019-02-28 | End: 2019-03-03 | Stop reason: HOSPADM

## 2019-02-28 RX ADMIN — MORPHINE SULFATE 6 MG: 10 INJECTION INTRAVENOUS at 02:40

## 2019-02-28 RX ADMIN — SODIUM CHLORIDE 2000 ML: 0.9 INJECTION, SOLUTION INTRAVENOUS at 02:37

## 2019-02-28 RX ADMIN — SODIUM CHLORIDE, SODIUM LACTATE, POTASSIUM CHLORIDE, AND CALCIUM CHLORIDE 250 ML/HR: .6; .31; .03; .02 INJECTION, SOLUTION INTRAVENOUS at 14:34

## 2019-02-28 RX ADMIN — HYDROMORPHONE HYDROCHLORIDE 1 MG: 1 INJECTION, SOLUTION INTRAMUSCULAR; INTRAVENOUS; SUBCUTANEOUS at 17:52

## 2019-02-28 RX ADMIN — ENOXAPARIN SODIUM 40 MG: 40 INJECTION SUBCUTANEOUS at 08:36

## 2019-02-28 RX ADMIN — HYDROMORPHONE HYDROCHLORIDE 1 MG: 1 INJECTION, SOLUTION INTRAMUSCULAR; INTRAVENOUS; SUBCUTANEOUS at 14:33

## 2019-02-28 RX ADMIN — PANTOPRAZOLE SODIUM 40 MG: 40 INJECTION, POWDER, FOR SOLUTION INTRAVENOUS at 08:36

## 2019-02-28 RX ADMIN — FENTANYL CITRATE 50 MCG: 50 INJECTION, SOLUTION INTRAMUSCULAR; INTRAVENOUS at 12:42

## 2019-02-28 RX ADMIN — HYDROMORPHONE HYDROCHLORIDE 1 MG: 1 INJECTION, SOLUTION INTRAMUSCULAR; INTRAVENOUS; SUBCUTANEOUS at 08:32

## 2019-02-28 RX ADMIN — IOHEXOL 100 ML: 350 INJECTION, SOLUTION INTRAVENOUS at 03:19

## 2019-02-28 RX ADMIN — ALBUTEROL SULFATE 2 PUFF: 90 AEROSOL, METERED RESPIRATORY (INHALATION) at 08:35

## 2019-02-28 RX ADMIN — FENTANYL CITRATE 50 MCG: 50 INJECTION, SOLUTION INTRAMUSCULAR; INTRAVENOUS at 12:50

## 2019-02-28 RX ADMIN — NICOTINE 1 PATCH: 14 PATCH TRANSDERMAL at 08:34

## 2019-02-28 RX ADMIN — HYDROMORPHONE HYDROCHLORIDE 1 MG: 1 INJECTION, SOLUTION INTRAMUSCULAR; INTRAVENOUS; SUBCUTANEOUS at 10:17

## 2019-02-28 RX ADMIN — ONDANSETRON 4 MG: 2 INJECTION INTRAMUSCULAR; INTRAVENOUS at 08:32

## 2019-02-28 RX ADMIN — HYDROMORPHONE HYDROCHLORIDE 1 MG: 1 INJECTION, SOLUTION INTRAMUSCULAR; INTRAVENOUS; SUBCUTANEOUS at 20:50

## 2019-02-28 RX ADMIN — ONDANSETRON 4 MG: 2 INJECTION INTRAMUSCULAR; INTRAVENOUS at 14:34

## 2019-02-28 RX ADMIN — ONDANSETRON 4 MG: 2 INJECTION INTRAMUSCULAR; INTRAVENOUS at 20:50

## 2019-02-28 RX ADMIN — HYDROMORPHONE HYDROCHLORIDE 1 MG: 1 INJECTION, SOLUTION INTRAMUSCULAR; INTRAVENOUS; SUBCUTANEOUS at 04:43

## 2019-02-28 RX ADMIN — SODIUM CHLORIDE 200 ML/HR: 0.9 INJECTION, SOLUTION INTRAVENOUS at 11:47

## 2019-02-28 RX ADMIN — LIDOCAINE HYDROCHLORIDE 1 ML: 10 INJECTION, SOLUTION INFILTRATION; PERINEURAL at 13:01

## 2019-02-28 RX ADMIN — ALBUMIN (HUMAN) 187.5 G: 12.5 SOLUTION INTRAVENOUS at 15:18

## 2019-02-28 RX ADMIN — MORPHINE SULFATE 8 MG: 10 INJECTION INTRAVENOUS at 03:57

## 2019-02-28 RX ADMIN — SODIUM CHLORIDE 200 ML/HR: 0.9 INJECTION, SOLUTION INTRAVENOUS at 05:07

## 2019-02-28 RX ADMIN — LIDOCAINE HYDROCHLORIDE 5 ML: 10 INJECTION, SOLUTION INFILTRATION; PERINEURAL at 12:52

## 2019-02-28 NOTE — ASSESSMENT & PLAN NOTE
· Familial  · Patient's triglycerides are greater than 4000  · Currently takes fenofibric acid, niacin, Lovaza, Crestor, he states that he is compliant

## 2019-02-28 NOTE — CONSULTS
Consultation - [facility]  Rg Carter 37 y o  male MRN: [id number]  Unit/Bed#: ED 24 Encounter: [id number]        Consults    Reason for Consult / Principal Problem: Abdominal Pain, High TG    HPI: Ava Valle is a 36 yo M with a PMH of chronic pancreatitis 2/2 hypertriglyceridemia, presenting for acute onset of abdominal pain since yesterday  He reports he is compliant with his triglyceride medications and has been eating low-fat diet  Has a history of familial hypertriglyceridemia  He is found have elevated triglycerides greater than 4000 on admission and inflammation on CT consistent with either gastritis or pancreatitis  His lipase in the 400s  He reports this pain is worse and he has had in the past   He is status post cholecystectomy  In September he had a similar episode and he required plasmapheresis for triglycerides greater than 1000  He gets intermittent splanchnic nerve blocks with Dr Faustino Mcadams with pain management for his chronic pancreatitis  He still smokes but half pack a day  He drinks alcohol occasionally, he reports his last drink was a few weeks ago when he had a couple beers  He has been seeing a cardiologist was a lipid specialist and he recently increased his niacin      REVIEW OF SYSTEMS: Negative except for as stated above    Historical Information   Past Medical History:   Diagnosis Date    Asthma     Chronic pain disorder     GERD (gastroesophageal reflux disease)     Hyperlipidemia     Liver abscess     Meniscus tear     left knee work injury last assessed 08/24/2016    Pancreatitis     Pneumonia      Past Surgical History:   Procedure Laterality Date    CELIAC PLEXUS BLOCK Bilateral 10/29/2018    Procedure: SPLANCHNIC NERVE BLOCK;  Surgeon: Jessika Dc MD;  [facility];  Service: Pain Management     CELIAC PLEXUS BLOCK Bilateral 1/10/2019    Procedure: SPLANCHNIC NERVE BLOCK;  Surgeon: Jessika Dc MD;  [facility];  Service: Pain Management     CHOLECYSTECTOMY      ESOPHAGOGASTRODUODENOSCOPY N/A 11/16/2017    Procedure: ESOPHAGOGASTRODUODENOSCOPY (EGD); Surgeon: Miles Pro MD;  [facility]; Service: Gastroenterology    ESOPHAGOGASTRODUODENOSCOPY N/A 4/19/2018    Procedure: ESOPHAGOGASTRODUODENOSCOPY (EGD); Surgeon: Teresa Salgado MD;  [facility]; Service: Gastroenterology    ESOPHAGOGASTRODUODENOSCOPY N/A 5/10/2018    Procedure: ESOPHAGOGASTRODUODENOSCOPY (EGD); Surgeon: Ollie Sharpe MD;  [facility];   Service: Gastroenterology    KNEE ARTHROSCOPY Bilateral     KNEE ARTHROSCOPY Right 2009    cibishino last assessed 08/24/2016   [facility]      NERVE BLOCK Bilateral 5/29/2018    Procedure: SPLANCHNIC NERVE BLOCK;  Surgeon: Karyn Peraza MD;  [facility];  Service: Pain Management     PANCREAS SURGERY      stents    LA INJECT NERV [address] Bilateral 5/9/2017    Procedure: CELIAC PLEXUS BLOCK ;  Surgeon: Karyn Peraza MD;  [facility];  Service: Pain Management     LA INJECT NERV BLCK,CELIAC PLEXUS Bilateral 6/1/2017    Procedure: SPLANCHNIC NERVE BLOCK at T12;  Surgeon: Karyn Peraza MD;  [facility];  Service: Pain Management     LA INJECT NERV BLCK,CELIAC PLEXUS Bilateral 8/8/2017    Procedure: BILATERAL SPLANCHNIC NERVE BLOCK T12;  Surgeon: Karyn Peraza MD;  [facility];  Service: Pain Management     LA INJECT NERV [facility] Bilateral 12/28/2017    Procedure: SPLANCHNIC NERVE BLOCK;  Surgeon: Karyn Peraza MD;  [facility];  Service: Pain Management     LA LAP,CHOLECYSTECTOMY N/A 2/14/2017    Procedure: LAPAROSCOPIC CHOLECYSTECTOMY, IOC, POSSIBLE OPEN ;  Surgeon: Maral Fountain MD;  [facility];  Service: General    ROTATOR CUFF REPAIR Right     SHOULDER ARTHROSCOPY      SHOULDER ARTHROSCOPY Right      Social History   Social History     Substance and Sexual Activity   Alcohol Use Yes    Alcohol/week: 0 6 oz    Types: 1 Cans of beer per week    Comment: rarely, occasionally, history of no alcohol use per allscripts     Social History     Substance and Sexual Activity   Drug Use No     Social History     Tobacco Use   Smoking Status Current Every Day Smoker    Packs/day: 0 50    Years: 20 00    Pack years: 10 00   Smokeless Tobacco Never Used     Family History   Problem Relation Age of Onset    Cirrhosis Mother     Heart disease Other         cardiac disorder    Cancer Other        Meds/Allergies       (Not in a hospital admission)  Current Facility-Administered Medications   Medication Dose Route Frequency    albumin human (FLEXBUMIN) 5 % injection 187 5 g  187 5 g Intravenous Once    albuterol (PROVENTIL HFA,VENTOLIN HFA) inhaler 2 puff  2 puff Inhalation Q6H PRN    calcium gluconate 1 g in sodium chloride 0 9 % 100 mL IVPB  1 g Intravenous Q1H PRN    enoxaparin (LOVENOX) subcutaneous injection 40 mg  40 mg Subcutaneous Daily    hydrALAZINE (APRESOLINE) injection 5 mg  5 mg Intravenous Q6H PRN    HYDROmorphone (DILAUDID) injection 1 mg  1 mg Intravenous Q4H PRN    nicotine (NICODERM CQ) 14 mg/24hr TD 24 hr patch 1 patch  1 patch Transdermal Daily    ondansetron (ZOFRAN) injection 4 mg  4 mg Intravenous Q6H PRN    pantoprazole (PROTONIX) injection 40 mg  40 mg Intravenous Q24H CARRIE    sodium chloride 0 9 % infusion  200 mL/hr Intravenous Continuous     Facility-Administered Medications Ordered in Other Encounters   Medication Dose Route Frequency    oxyCODONE-acetaminophen (PERCOCET) 5-325 mg per tablet 2 tablet  2 tablet Oral Once       Allergies   Allergen Reactions    Bee Venom Swelling           Objective     Blood pressure (!) 186/106, pulse 84, temperature 97 5 °F (36 4 °C), temperature source Oral, resp  rate 18, height 5' 11" (1 803 m), weight 98 3 kg (216 lb 11 4 oz), SpO2 95 %        Intake/Output Summary (Last 24 hours) at 2/28/2019 1158  Last data filed at 2/28/2019 1147  Gross per 24 hour   Intake 3000 ml   Output 1500 ml   Net 1500 ml         PHYSICAL EXAM:      General Appearance:   Alert, oriented x3, appears in moderate distress due to abdominal pain   HEENT:   Normocephalic, atraumatic   Neck:  Supple, symmetrical, trachea midline   Lungs:   Clear to auscultation bilaterally, no respiratory distress   Heart[de-identified]   RRR, no murmur   Abdomen:   Non-distended, soft, BS active, NTTP   Rectal:   Deferred    Extremities:  No cyanosis or LE edema    Pulses:  2+ and symmetric all extremities    Skin:  No jaundice or pallor     Lab Results:   Results from last 7 days   Lab Units 02/28/19  0554 02/28/19  0025   WBC Thousand/uL 6 34 4 09*   HEMOGLOBIN g/dL 14 3 15 3   HEMATOCRIT % 40 0 41 1   PLATELETS Thousands/uL 124* 125*   NEUTROS PCT %  --  56   LYMPHS PCT %  --  31   MONOS PCT %  --  9   EOS PCT %  --  2     Results from last 7 days   Lab Units 02/28/19  0551   POTASSIUM mmol/L 4 6   CHLORIDE mmol/L 102   CO2 mmol/L 23   BUN mg/dL 10   CREATININE mg/dL 0 62   CALCIUM mg/dL 8 6   ALK PHOS U/L 101   ALT U/L 610*   AST U/L 396*         Results from last 7 days   Lab Units 02/28/19  0449   LIPASE u/L 267       Imaging Studies: I have personally reviewed pertinent imaging studies  Ct Abdomen Pelvis With Contrast  Result Date: 2/28/2019  Impression: On the coronal images there is fat stranding superior to the pancreas body 601:93 which abuts the stomach and may be the cause of the patient's gastritis  Gastric wall thickening compatible colitis and proximal duodenal wall thickening compatible with duodenitis  This may be secondary to the above-mentioned pancreatitis         ASSESSMENT and PLAN:      Acute Pancreatitis 2/2 Hypertriglyceridemia  - TG >4000 on admission, pt reports compliance with medications as outpatient as well as low fat diet but does continue to consume alcohol intermittently and smoke daily  - Will plan for plasma exchange today due to the significant of the TG level  - NPO  - IR consult for HD catheter placement  - Discussed with [facility] and will plan for plasmapheresis this afternoon after HD catheter is placed and pending how his TG respond, he may need additional rounds of plasmapheresis  - Check TG q12 hours  - Aggressive pain control, prn antiemetics  - Serial abdominal exams  - LR at 250 ml/hr  - DVT Prophylaxis      Patient will be seen and examined by Dr Ramez Pimentel  All rod medical decisions were made by Dr Ramez Pimentel  Thank you for allowing us to participate in the care of this patient  We will follow with you closely

## 2019-02-28 NOTE — PLAN OF CARE
Problem: PAIN - ADULT  Goal: Verbalizes/displays adequate comfort level or baseline comfort level  Description  Interventions:  - Encourage patient to monitor pain and request assistance  - Assess pain using appropriate pain scale  - Administer analgesics based on type and severity of pain and evaluate response  - Implement non-pharmacological measures as appropriate and evaluate response  - Consider cultural and social influences on pain and pain management  - Notify physician/advanced practitioner if interventions unsuccessful or patient reports new pain  Outcome: Progressing     Problem: INFECTION - ADULT  Goal: Absence or prevention of progression during hospitalization  Description  INTERVENTIONS:  - Assess and monitor for signs and symptoms of infection  - Monitor lab/diagnostic results  - Monitor all insertion sites, i e  indwelling lines, tubes, and drains  - Monitor endotracheal (as able) and nasal secretions for changes in amount and color  - Cascade appropriate cooling/warming therapies per order  - Administer medications as ordered  - Instruct and encourage patient and family to use good hand hygiene technique  - Identify and instruct in appropriate isolation precautions for identified infection/condition  Outcome: Progressing     Problem: SAFETY ADULT  Goal: Patient will remain free of falls  Description  INTERVENTIONS:  - Assess patient frequently for physical needs  -  Identify cognitive and physical deficits and behaviors that affect risk of falls    -  Cascade fall precautions as indicated by assessment   - Educate patient/family on patient safety including physical limitations  - Instruct patient to call for assistance with activity based on assessment  - Modify environment to reduce risk of injury  - Consider OT/PT consult to assist with strengthening/mobility  Outcome: Progressing     Problem: DISCHARGE PLANNING  Goal: Discharge to home or other facility with appropriate resources  Description  INTERVENTIONS:  - Identify barriers to discharge w/patient and caregiver  - Arrange for needed discharge resources and transportation as appropriate  - Identify discharge learning needs (meds, wound care, etc )  - Arrange for interpretive services to assist at discharge as needed  - Refer to Case Management Department for coordinating discharge planning if the patient needs post-hospital services based on physician/advanced practitioner order or complex needs related to functional status, cognitive ability, or social support system  Outcome: Progressing     Problem: GASTROINTESTINAL - ADULT  Goal: Minimal or absence of nausea and/or vomiting  Description  INTERVENTIONS:  - Administer IV fluids as ordered to ensure adequate hydration  - Maintain NPO status until nausea and vomiting are resolved  - Nasogastric tube as ordered  - Administer ordered antiemetic medications as needed  - Provide nonpharmacologic comfort measures as appropriate  - Advance diet as tolerated, if ordered  - Nutrition services referral to assist patient with adequate nutrition and appropriate food choices  Outcome: Progressing  Goal: Maintains or returns to baseline bowel function  Description  INTERVENTIONS:  - Assess bowel function  - Encourage oral fluids to ensure adequate hydration  - Administer IV fluids as ordered to ensure adequate hydration  - Administer ordered medications as needed  - Encourage mobilization and activity  - Nutrition services referral to assist patient with appropriate food choices  Outcome: Progressing  Goal: Maintains adequate nutritional intake  Description  INTERVENTIONS:  - Monitor percentage of each meal consumed  - Identify factors contributing to decreased intake, treat as appropriate  - Assist with meals as needed  - Monitor I&O, WT and lab values  - Obtain nutrition services referral as needed  Outcome: Progressing     Problem: METABOLIC, FLUID AND ELECTROLYTES - ADULT  Goal: Electrolytes maintained within normal limits  Description  INTERVENTIONS:  - Monitor labs and assess patient for signs and symptoms of electrolyte imbalances  - Administer electrolyte replacement as ordered  - Monitor response to electrolyte replacements, including repeat lab results as appropriate  - Instruct patient on fluid and nutrition as appropriate  Outcome: Progressing

## 2019-02-28 NOTE — ASSESSMENT & PLAN NOTE
· Likely related to pain  · Will order hydralazine IV Q 6 p r n   For systolic BP greater than 588  · Monitor vital signs

## 2019-02-28 NOTE — PROCEDURES
Central Line Insertion  Date/Time: 2/28/2019 1:05 PM  Performed by: Veronica Gallego MD  Authorized by: Veronica Gallego MD     Patient location:  IR  Consent:     Consent obtained:  Written    Consent given by:  Patient    Risks discussed:  Bleeding and infection  Universal protocol:     Procedure explained and questions answered to patient or proxy's satisfaction: yes      Relevant documents present and verified: yes      Test results available and properly labeled: yes      Required blood products, implants, devices, and special equipment available: yes      Immediately prior to procedure, a time out was called: yes      Patient identity confirmed:  Provided demographic data  Pre-procedure details:     Hand hygiene: Hand hygiene performed prior to insertion      Sterile barrier technique: All elements of maximal sterile technique followed      Skin preparation:  2% chlorhexidine    Skin preparation agent: Skin preparation agent completely dried prior to procedure    Indications:     Central line indications: other (comment)    Anesthesia (see MAR for exact dosages): Anesthesia method:  Local infiltration    Local anesthetic:  Lidocaine 1% w/o epi  Procedure details:     Location:  Right internal jugular    Vessel type: vein      Laterality:  Right    Approach: percutaneous technique used      Patient position:  Flat    Catheter type:  Double lumen    Catheter size:  14 Fr    Ultrasound guidance: yes      Sterile ultrasound techniques: Sterile gel and sterile probe covers were used      Number of attempts:  1    Successful placement: yes      Vessel of catheter tip end:  Right atrium  Post-procedure details:     Post-procedure:  Dressing applied and line sutured    Assessment:  Blood return through all ports, placement verified by x-ray and free fluid flow    Post-procedure complications: none      Patient tolerance of procedure:   Tolerated well, no immediate complications  Comments:      14 Fr 24 cm [employer] CaptureoFlow double lumen dialysis catheter

## 2019-02-28 NOTE — ED NOTES
Patient rang call bell, asking for pain medication  RN informed        Delia Carrillo  02/28/19 0715

## 2019-02-28 NOTE — UTILIZATION REVIEW
Initial Clinical Review    Admission: Date/Time/Statement: 2/28/19 @ 0347   Orders Placed This Encounter   Procedures    Inpatient Admission (expected length of stay for this patient Order details is greater than two midnights)     Standing Status:   Standing     Number of Occurrences:   1     Order Specific Question:   Admitting Physician     Answer:   Catia Lunsford [[id number]]     Order Specific Question:   Level of Care     Answer:   Med Surg [16]     Order Specific Question:   Estimated length of stay     Answer:   More than 2 Midnights     Order Specific Question:   Certification     Answer:   I certify that inpatient services are medically necessary for this patient for a duration of greater than two midnights  See H&P and MD Progress Notes for additional information about the patient's course of treatment  ED: Date/Time/Mode of Arrival:   ED Arrival Information     Expected Arrival Acuity Means of Arrival Escorted By Service Admission Type    - 2/27/2019 23:22 Urgent Walk-In Self General Medicine Urgent    Arrival Complaint    abd pain        Chief Complaint:   Chief Complaint   Patient presents with    Abdominal Pain     c/o abd pain, pt reports history of gastritis     History of Illness: 38 yo male to ED w/ chronic pain syndrome , c/o abd pain and Nausea x1 day   Pain assoc  W/ nausea , no vomiting     ED Vital Signs:   ED Triage Vitals [02/27/19 2336]   Temperature Pulse Respirations Blood Pressure SpO2   97 5 °F (36 4 °C) 89 18 (!) 176/104 98 %      Temp Source Heart Rate Source Patient Position - Orthostatic VS BP Location FiO2 (%)   Oral Monitor Lying Right arm --      Pain Score       8        Wt Readings from Last 1 Encounters:   02/28/19 98 3 kg (216 lb 11 4 oz)     Vital Signs (abnormal):   02/28/19 1200    80  19  184/108Abnormal   94 %  None (Room air)  Sitting   02/28/19 1000    84  18  186/106Abnormal   95 %  None (Room air)  Lying   02/28/19 0600    76    176/89Abnormal   90 %     02/28/19 0500    76    177/85Abnormal   91 %    Lying   02/28/19 0400    78    173/109Abnormal   97 %       02/28/19 0100    82    175/101Abnormal   97 %           Pertinent Labs/Diagnostic Test Results: triglycerides > 4000, gluc 149, ast  511, alt  677, lipase  461, wbc  4 09, plt   125  CT abd-    On the coronal images there is fat stranding superior to the pancreas body 601:93 which abuts the stomach and may be the cause of the patient's gastritis  Gastric wall thickening compatible colitis and proximal duodenal wall thickening compatible with duodenitis   This may be secondary to the above-mentioned pancreatitis            ED Treatment:   Medication Administration from 02/27/2019 2322 to 02/28/2019 1209       Date/Time Order Dose Route Action Action by Comments     02/28/2019 0240 morphine (PF) 10 mg/mL injection 6 mg 6 mg Intravenous Given Leon Mendoza RN      02/28/2019 0442 sodium chloride 0 9 % bolus 2,000 mL 0 mL Intravenous Stopped Leon Mendoza RN      02/28/2019 0237 sodium chloride 0 9 % bolus 2,000 mL 2,000 mL Intravenous [place] [facility] Keyanaelaine Gutierrez RN      02/28/2019 0319 iohexol (OMNIPAQUE) 350 MG/ML injection (MULTI-DOSE) 100 mL 100 mL Intravenous Given David Munoz [facility]      02/28/2019 0357 morphine (PF) 10 mg/mL injection 8 mg 8 mg Intravenous Given Justo Edgar, RN      02/28/2019 1147 sodium chloride 0 9 % infusion 200 mL/hr Intravenous [place]ansom Tyrkevan, RN      02/28/2019 1146 sodium chloride 0 9 % infusion 0 mL/hr Intravenous Stopped Bisi Gaitan RN      02/28/2019 0507 sodium chloride 0 9 % infusion 200 mL/hr Intravenous [address] Jayden Gutierrez [facility]      02/28/2019 0832 ondansetron (ZOFRAN) injection 4 mg 4 mg Intravenous Given Bisi Gaitan RN      02/28/2019 0834 nicotine (NICODERM CQ) 14 mg/24hr TD 24 hr patch 1 patch 1 patch Transdermal Medication Applied Bisi Gaitan RN      02/28/2019 0836 enoxaparin (LOVENOX) subcutaneous injection 40 mg 40 mg Subcutaneous Given Jenniffer Landon RN      02/28/2019 4840 HYDROmorphone (DILAUDID) injection 1 mg 1 mg Intravenous Given Jenniffer Landon RN      02/28/2019 0443 HYDROmorphone (DILAUDID) injection 1 mg 1 mg Intravenous Given Bebeto Seaman RN      02/28/2019 0836 pantoprazole (PROTONIX) injection 40 mg 40 mg Intravenous Given Jenniffer Landon RN      02/28/2019 0835 albuterol (PROVENTIL HFA,VENTOLIN HFA) inhaler 2 puff 2 puff Inhalation Given Jenniffer Landon RN      02/28/2019 1017 HYDROmorphone (DILAUDID) injection 1 mg 1 mg Intravenous Given Jenniffer Landon RN         Past Medical/Surgical History: Active Ambulatory Problems     Diagnosis Date Noted    Pancreatic lesion 02/14/2017    Renal mass 03/02/2017    Smoker 03/02/2017    RBBB 11/16/2017    Epigastric pain 11/15/2017    Acute gastritis without hemorrhage 11/15/2017    Abdominal pain 04/18/2018    Abnormal transaminases 04/18/2018    Acute on chronic pancreatitis (HCC) 04/18/2018    Leukopenia 04/18/2018    Hypertriglyceridemia 04/19/2018    Chronic pain syndrome 05/02/2018    Esophagitis 05/08/2018    Other chronic pancreatitis ([facility] [address] ) 05/11/2018    Chronic gastritis without bleeding 75/68/9905    Uncomplicated opioid dependence ([facility] [address] ) 06/28/2018    Long-term current use of opiate analgesic 06/28/2018    GERD (gastroesophageal reflux disease) 09/11/2018    Hyponatremia 09/11/2018    Healthcare maintenance 11/21/2018     Past Medical History:   Diagnosis Date    Asthma     Chronic pain disorder     GERD (gastroesophageal reflux disease)     Hyperlipidemia     Liver abscess     Meniscus tear     Pancreatitis     Pneumonia      Admitting Diagnosis: Abdominal pain [R10 9]  Age/Sex: 37 y o  male  Assessment/Plan: 36 yo male admitted w/ acute on chronic pancreatitis make NPO , IVF , pain meds prn , GI consult , lipase, supportive care   GERD - iv Protonix    Chronic pain syndrome - gets nerve blocks q 2-3 months  hyper triglycerides > 4000, compliant w/ meds at home, cont fenofibric acid, niacin , lovaza and crestor  Elevated BP - likely r/t pain , hydralazine prn and monitor        Admission Orders:  Scheduled Meds:   Current Facility-Administered Medications:  albumin human 187 5 g Intravenous Once    albuterol 2 puff Inhalation Q6H PRN    calcium gluconate 1 g Intravenous Q1H PRN    enoxaparin 40 mg Subcutaneous Daily    hydrALAZINE 5 mg Intravenous Q6H PRN    HYDROmorphone 1 mg Intravenous Q4H PRN    lactated ringers 250 mL/hr Intravenous Continuous    nicotine 1 patch Transdermal Daily    ondansetron 4 mg Intravenous Q6H PRN    pantoprazole 40 mg Intravenous Q24H [facility]      NPO   SCD  Up and oOB as colby   GI consult

## 2019-02-28 NOTE — ASSESSMENT & PLAN NOTE
Patient presents with epigastric abdominal pain he does have chronic pancreatitis it appears that he has been admitted in the past for acute on chronic pancreatitis    · NPO advanced as tolerated  · IV normal saline at 200 mL/hour  · Pain medication PRN   · GI consulted  · No fever, no chills  · Lipase elevated at >400  · Supportive care

## 2019-02-28 NOTE — ASSESSMENT & PLAN NOTE
· Being followed by Dr Giovanny Jang  · Patient gets bilateral splenic nerve blocks which helps with his symptoms every 2-3 months

## 2019-02-28 NOTE — H&P
H&P- Lynford Peak 1975, 37 y o  male MRN: [id number]    Unit/Bed#: ED 24 Encounter: [id number]    Primary Care Provider: LUANA Vera   Date and time admitted to hospital: 2/27/2019 11:31 PM        * Acute on chronic pancreatitis [facility])  Assessment & Plan  Patient presents with epigastric abdominal pain he does have chronic pancreatitis it appears that he has been admitted in the past for acute on chronic pancreatitis  · NPO advanced as tolerated  · IV normal saline at 200 mL/hour  · Pain medication PRN   · GI consulted  · No fever, no chills  · Lipase elevated at >400  · Supportive care    GERD (gastroesophageal reflux disease)  Assessment & Plan  · IV Protonix    Chronic pain syndrome  Assessment & Plan  · Being followed by Dr Simone Juarez  · Patient gets bilateral splenic nerve blocks which helps with his symptoms every 2-3 months    Hypertriglyceridemia  Assessment & Plan  · Familial  · Patient's triglycerides are greater than 4000  · Currently takes fenofibric acid, niacin, Lovaza, Crestor, he states that he is compliant      Smoker  Assessment & Plan  · Encouraged smoking cessation  · Start nicotine patch    Elevated blood pressure reading  Assessment & Plan  · Likely related to pain  · Will order hydralazine IV Q 6 p r n  For systolic BP greater than 095  · Monitor vital signs        VTE Prophylaxis: Enoxaparin (Lovenox)  / sequential compression device   Code Status:  Full code  POLST: There is no POLST form on file for this patient (pre-hospital)  Discussion with family:  Patient    Anticipated Length of Stay:  Patient will be admitted on an Inpatient basis with an anticipated length of stay of   2 midnights  Justification for Hospital Stay:  Acute on chronic pancreatitis    Total Time for Visit, including Counseling / Coordination of Care: 1 hour  Greater than 50% of this total time spent on direct patient counseling and coordination of care      Chief Complaint:   Abdominal    History of Present Illness:    Skip Naidu is a 37 y o  male with history of hypertriglyceridemia, chronic pancreatitis, chronic pain syndrome who presents with   Abdominal pain and nausea x 1 day  Pain is associated with nausea, but no vomiting, diarrhea, melena, hematochezia  Patient sees pain management for bilateral splenic nerve blocks every 2-3 months  His last visit with Dr Temitope Singh was about 2 months ago  he denies any shortness of breath, chest pain, fever, chills, weakness  Review of Systems:    Review of Systems   Constitutional: Negative for activity change and appetite change  HENT: Negative  Negative for congestion, dental problem, drooling and ear discharge  Eyes: Negative  Respiratory: Negative  Cardiovascular: Negative for chest pain, palpitations and leg swelling  Gastrointestinal: Positive for abdominal pain and nausea  Endocrine: Negative  Musculoskeletal: Negative  Skin: Negative  Neurological: Negative  Psychiatric/Behavioral: Negative  Past Medical and Surgical History:     Past Medical History:   Diagnosis Date    Asthma     Chronic pain disorder     GERD (gastroesophageal reflux disease)     Hyperlipidemia     Liver abscess     Meniscus tear     left knee work injury last assessed 08/24/2016    Pancreatitis     Pneumonia        Past Surgical History:   Procedure Laterality Date    CELIAC PLEXUS BLOCK Bilateral 10/29/2018    Procedure: SPLANCHNIC NERVE BLOCK;  Surgeon: Lenny Garcia MD;  [facility];  Service: Pain Management     CELIAC PLEXUS BLOCK Bilateral 1/10/2019    Procedure: SPLANCHNIC NERVE BLOCK;  Surgeon: Lenny Garcia MD;  [facility];  Service: Pain Management     CHOLECYSTECTOMY      ESOPHAGOGASTRODUODENOSCOPY N/A 11/16/2017    Procedure: ESOPHAGOGASTRODUODENOSCOPY (EGD); Surgeon: Ron Posada MD;  [facility];   Service: Gastroenterology    ESOPHAGOGASTRODUODENOSCOPY N/A 4/19/2018    Procedure: ESOPHAGOGASTRODUODENOSCOPY (EGD); Surgeon: Christiano Power MD;  [facility]; Service: Gastroenterology    ESOPHAGOGASTRODUODENOSCOPY N/A 5/10/2018    Procedure: ESOPHAGOGASTRODUODENOSCOPY (EGD); Surgeon: Pete Renee MD;  [facility]; Service: Gastroenterology    KNEE ARTHROSCOPY Bilateral     KNEE ARTHROSCOPY Right 2009    cibishino last assessed 08/24/2016   [facility] LIVER SURGERY      NERVE BLOCK Bilateral 5/29/2018    Procedure: SPLANCHNIC NERVE BLOCK;  Surgeon: Dahiana Johansen MD;  [facility];  Service: Pain Management     PANCREAS SURGERY      stents    OR INJECT NERV [address] Bilateral 5/9/2017    Procedure: CELIAC PLEXUS BLOCK ;  Surgeon: Dahiana Johansen MD;  [facility];  Service: Pain Management     OR INJECT NERV BLCK,CELIAC PLEXUS Bilateral 6/1/2017    Procedure: SPLANCHNIC NERVE BLOCK at T12;  Surgeon: Dahiana Johansen MD;  [facility];  Service: Pain Management     OR INJECT NERV BLCK,CELIAC PLEXUS Bilateral 8/8/2017    Procedure: BILATERAL SPLANCHNIC NERVE BLOCK T12;  Surgeon: Dahiana Johansen MD;  [facility];  Service: Pain Management     OR INJECT NERV Beaulah Favor Bilateral 12/28/2017    Procedure: SPLANCHNIC NERVE BLOCK;  Surgeon: Dahiana Johasnen MD;  [facility];  Service: Pain Management     OR LAP,CHOLECYSTECTOMY N/A 2/14/2017    Procedure: LAPAROSCOPIC CHOLECYSTECTOMY, IOC, POSSIBLE OPEN ;  Surgeon: Chava Byers MD;  [facility];  Service: General    ROTATOR CUFF REPAIR Right     SHOULDER ARTHROSCOPY      SHOULDER ARTHROSCOPY Right        Meds/Allergies:    Prior to Admission medications    Medication Sig Start Date End Date Taking?  Authorizing Provider   albuterol (VENTOLIN HFA) 90 mcg/act inhaler Inhale 2 puffs every 6 (six) hours as needed for wheezing Prn 1/10/19 1/10/20 Yes LUANA Tapia   Choline Fenofibrate (FENOFIBRIC ACID) 135 MG CPDR Take 1 capsule (135 mg total) by mouth daily 1/10/19  Yes LUANA Daugherty   CREON 20083 units CPEP TAKE 1 CAPSULE THREE TIMES A DAY BEFORE MEALS 1/14/19  Yes Hannah Calloway PA-C   EPINEPHrine (EPIPEN 2-LARRY) 0 3 mg/0 3 mL SOAJ EpiPen 2-Larry 0 3 MG/0 3ML Injection Solution Auto-injector  INJECT 0 3ML INTRAMUSCULARLY AS DIRECTED  Quantity: 1;  Refills: 1      Alan CRAFT, Telma Mario ; Active  2 Solution Auto-injector Pen   Yes Historical Provider, MD   niacin (NIASPAN) 500 mg CR tablet Take 2 tablets (1,000 mg total) by mouth daily at bedtime 11/21/18  Yes Jacey Colin MD   [id number] ethyl esters (LOVAZA) 1 g capsule Take 2 capsules (2 g total) by mouth 2 (two) times a day 1/10/19  Yes LUANA Daugherty   ondansetron (ZOFRAN-ODT) 4 mg disintegrating tablet Take 2 tablets (8 mg total) by mouth every 8 (eight) hours 1/10/19  Yes LUANA Daugherty   pancrelipase, Lip-Prot-Amyl, (CREON) 12,000 units capsule Take 12,000 units of lipase by mouth as needed for snacks 11/15/18  Yes Dinora Mensah PA-C   Pancrelipase, Lip-Prot-Amyl, (CREON) 22333 units CPEP Take 1 capsule (36,000 Units total) by mouth 3 (three) times a day before meals 1/10/19  Yes Dinora Mensah PA-C   pantoprazole (PROTONIX) 40 mg tablet Take 1 tablet (40 mg total) by mouth 2 (two) times a day 1/10/19  Yes LUANA Daugherty   pregabalin (LYRICA) 100 mg capsule Take 1 capsule (100 mg total) by mouth 3 (three) times a day for 30 days 1/10/19 2/28/19 Yes Karyn Peraza MD   ranitidine (ZANTAC) 150 MG capsule Take 150 mg by mouth 2 (two) times a day   Yes Historical Provider, MD   rosuvastatin (CRESTOR) 40 MG tablet Take 1 tablet (40 mg total) by mouth daily 1/10/19  Yes LUANA Daugherty   traZODone (DESYREL) 50 mg tablet TAKE ONE TABLET BY MOUTH NIGHTLY AT BEDTIME  12/18/18  Yes LUANA Daugherty     I have reviewed home medications with patient personally  Allergies:    Allergies   Allergen Reactions    Bee Venom Swelling       Social History:     Marital Status: /Civil Union   Occupation:   Patient Pre-hospital Living Situation:   Patient Pre-hospital Level of Mobility:   Patient Pre-hospital Diet Restrictions:   Substance Use History:   Social History     Substance and Sexual Activity   Alcohol Use Yes    Alcohol/week: 0 6 oz    Types: 1 Cans of beer per week    Comment: rarely, occasionally, history of no alcohol use per allscripts     Social History     Tobacco Use   Smoking Status Current Every Day Smoker    Packs/day: 0 50    Years: 20 00    Pack years: 10 00   Smokeless Tobacco Never Used     Social History     Substance and Sexual Activity   Drug Use No       Family History:    Family History   Problem Relation Age of Onset    Cirrhosis Mother     Heart disease Other         cardiac disorder    Cancer Other        Physical Exam:     Vitals:   Blood Pressure: (!) 173/109 (02/28/19 0400)  Pulse: 78 (02/28/19 0400)  Temperature: 97 5 °F (36 4 °C) (02/27/19 2336)  Temp Source: Oral (02/27/19 2336)  Respirations: 18 (02/27/19 2336)  Weight - Scale: 98 3 kg (216 lb 11 4 oz) (02/27/19 2336)  SpO2: 97 % (02/28/19 0400)    Physical Exam   Constitutional: He is oriented to person, place, and time  He appears well-developed and well-nourished  HENT:   Head: Normocephalic  Eyes: Pupils are equal, round, and reactive to light  Neck: Normal range of motion  Cardiovascular: Normal rate, regular rhythm, normal heart sounds and intact distal pulses  Exam reveals no gallop and no friction rub  No murmur heard  Pulmonary/Chest: Effort normal and breath sounds normal  No stridor  No respiratory distress  He has no wheezes  He has no rales  He exhibits no tenderness  Abdominal: Soft  Positive epigastric abdominal tenderness to palpation   Musculoskeletal: Normal range of motion  He exhibits no edema  Neurological: He is alert and oriented to person, place, and time  Skin: Skin is warm and dry             Additional Data:     Lab Results: I have personally reviewed pertinent reports  Results from last 7 days   Lab Units 02/28/19  0025   WBC Thousand/uL 4 09*   HEMOGLOBIN g/dL 15 3   HEMATOCRIT % 41 1   PLATELETS Thousands/uL 125*   NEUTROS PCT % 56   LYMPHS PCT % 31   MONOS PCT % 9   EOS PCT % 2     Results from last 7 days   Lab Units 02/28/19  0129   SODIUM mmol/L 138   POTASSIUM mmol/L 4 5   CHLORIDE mmol/L 103   CO2 mmol/L 26   BUN mg/dL 10   CREATININE mg/dL 0 75   ANION GAP mmol/L 9   CALCIUM mg/dL 9 2   ALBUMIN g/dL 3 5   TOTAL BILIRUBIN mg/dL 0 70   ALK PHOS U/L 109   ALT U/L 677*   AST U/L 511*   GLUCOSE RANDOM mg/dL 149*                       Imaging: I have personally reviewed pertinent reports  CT abdomen pelvis with contrast   Final Result by Erica Street MD (02/28 0334)      On the coronal images there is fat stranding superior to the pancreas body 601:93 which abuts the stomach and may be the cause of the patient's gastritis  Gastric wall thickening compatible colitis and proximal duodenal wall thickening compatible with duodenitis  This may be secondary to the above-mentioned pancreatitis  Workstation performed: [id number]             EKG, Pathology, and Other Studies Reviewed on Admission:   · EKG:     Allscripts / Epic Records Reviewed:  Yes

## 2019-02-28 NOTE — ED NOTES
New orders received for pain medications   patient informed  Chester Macias awaiting pharmacy      Daron Green RN  02/28/19 5308

## 2019-02-28 NOTE — ED PROVIDER NOTES
History  Chief Complaint   Patient presents with    Abdominal Pain     c/o abd pain, pt reports history of gastritis     Several hours gradual progressive moderate epigastric and LUQ pain similar to prior episodes of pancreatitis occurring in the context of hypertriglyceridemia and without aggravating or alleviating factors  No fever, nausea or vomiting  No CP or SOB or weakness or presyncope  No abdominal trauma  Sxs c/w prior pancreatitis episodes  Distant h/o cholecystectomy  No lower abd pain  No urinary sxs  No other sxs at this time aside from LUQ pain  Prior to Admission Medications   Prescriptions Last Dose Informant Patient Reported? Taking? CREON 88216 units CPEP   No No   Sig: TAKE 1 CAPSULE THREE TIMES A DAY BEFORE MEALS   Choline Fenofibrate (FENOFIBRIC ACID) 135 MG CPDR   No No   Sig: Take 1 capsule (135 mg total) by mouth daily   EPINEPHrine (EPIPEN 2-ARIEL) 0 3 mg/0 3 mL SOAJ  Self Yes No   Sig: EpiPen 2-Ariel 0 3 MG/0 3ML Injection Solution Auto-injector  INJECT 0 3ML INTRAMUSCULARLY AS DIRECTED     Quantity: 1;  Refills: 1      Toro Briceño ; Active  2 Solution Auto-injector Pen   Pancrelipase, Lip-Prot-Amyl, (CREON) 77383 units CPEP   No No   Sig: Take 1 capsule (36,000 Units total) by mouth 3 (three) times a day before meals   albuterol (VENTOLIN HFA) 90 mcg/act inhaler   No No   Sig: Inhale 2 puffs every 6 (six) hours as needed for wheezing Prn   niacin (NIASPAN) 500 mg CR tablet   No No   Sig: Take 2 tablets (1,000 mg total) by mouth daily at bedtime   omega-3-acid ethyl esters (LOVAZA) 1 g capsule   No No   Sig: Take 2 capsules (2 g total) by mouth 2 (two) times a day   ondansetron (ZOFRAN-ODT) 4 mg disintegrating tablet   No No   Sig: Take 2 tablets (8 mg total) by mouth every 8 (eight) hours   pancrelipase, Lip-Prot-Amyl, (CREON) 12,000 units capsule  Self No No   Sig: Take 12,000 units of lipase by mouth as needed for snacks   pantoprazole (PROTONIX) 40 mg tablet   No No Sig: Take 1 tablet (40 mg total) by mouth 2 (two) times a day   pregabalin (LYRICA) 100 mg capsule   No No   Sig: Take 1 capsule (100 mg total) by mouth 3 (three) times a day for 30 days   ranitidine (ZANTAC) 150 MG capsule  Self Yes No   Sig: Take 150 mg by mouth 2 (two) times a day   rosuvastatin (CRESTOR) 40 MG tablet   No No   Sig: Take 1 tablet (40 mg total) by mouth daily   traZODone (DESYREL) 50 mg tablet   No No   Sig: TAKE ONE TABLET BY MOUTH NIGHTLY AT BEDTIME       Facility-Administered Medications: None       Past Medical History:   Diagnosis Date    Asthma     Chronic pain disorder     GERD (gastroesophageal reflux disease)     Hyperlipidemia     Liver abscess     Meniscus tear     left knee work injury last assessed 08/24/2016    Pancreatitis     Pneumonia        Past Surgical History:   Procedure Laterality Date    CELIAC PLEXUS BLOCK Bilateral 10/29/2018    Procedure: SPLANCHNIC NERVE BLOCK;  Surgeon: Rafi Silverman MD;  [facility];  Service: Pain Management     CELIAC PLEXUS BLOCK Bilateral 1/10/2019    Procedure: SPLANCHNIC NERVE BLOCK;  Surgeon: Rafi Silverman MD;  [facility];  Service: Pain Management     CHOLECYSTECTOMY      ESOPHAGOGASTRODUODENOSCOPY N/A 11/16/2017    Procedure: ESOPHAGOGASTRODUODENOSCOPY (EGD); Surgeon: Rebekah Martinez MD;  [facility]; Service: Gastroenterology    ESOPHAGOGASTRODUODENOSCOPY N/A 4/19/2018    Procedure: ESOPHAGOGASTRODUODENOSCOPY (EGD); Surgeon: Latoya Nath MD;  [facility]; Service: Gastroenterology    ESOPHAGOGASTRODUODENOSCOPY N/A 5/10/2018    Procedure: ESOPHAGOGASTRODUODENOSCOPY (EGD); Surgeon: Usman Hartley MD;  [facility];   Service: Gastroenterology    KNEE ARTHROSCOPY Bilateral     KNEE ARTHROSCOPY Right 2009    cibishino last assessed 08/24/2016   Brooke Dong LIVER SURGERY      NERVE BLOCK Bilateral 5/29/2018    Procedure: SPLANCHNIC NERVE BLOCK;  Surgeon: Rafi Silverman MD;  [facility];  Service: Pain Management     PANCREAS SURGERY      stents    NC INJECT NERV BLCK,CELIAC PLEXUS Bilateral 5/9/2017    Procedure: CELIAC PLEXUS BLOCK ;  Surgeon: Jose J Murillo MD;  [facility];  Service: Pain Management     NC INJECT NERV BLCK,CELIAC PLEXUS Bilateral 6/1/2017    Procedure: SPLANCHNIC NERVE BLOCK at T12;  Surgeon: Jose J Murillo MD;  [facility];  Service: Pain Management     NC INJECT NERV BLCK,CELIAC PLEXUS Bilateral 8/8/2017    Procedure: BILATERAL SPLANCHNIC NERVE BLOCK T12;  Surgeon: Jose J Murillo MD;  [facility];  Service: Pain Management     NC INJECT NERV [place] Bilateral 12/28/2017    Procedure: SPLANCHNIC NERVE BLOCK;  Surgeon: Jose J Murillo MD;  [facility];  Service: Pain Management     NC LAP,CHOLECYSTECTOMY N/A 2/14/2017    Procedure: LAPAROSCOPIC CHOLECYSTECTOMY, IOC, POSSIBLE OPEN ;  Surgeon: Laurinda Lundborg, MD;  [facility];  Service: General    ROTATOR CUFF REPAIR Right     SHOULDER ARTHROSCOPY      SHOULDER ARTHROSCOPY Right        Family History   Problem Relation Age of Onset    Cirrhosis Mother     Heart disease Other         cardiac disorder    Cancer Other      I have reviewed and agree with the history as documented  Social History     Tobacco Use    Smoking status: Current Every Day Smoker     Packs/day: 0 50     Years: 20 00     Pack years: 10 00    Smokeless tobacco: Never Used   Substance Use Topics    Alcohol use: Yes     Alcohol/week: 0 6 oz     Types: 1 Cans of beer per week     Comment: rarely, occasionally, history of no alcohol use per allscripts    Drug use: No        Review of Systems   Constitutional: Negative for chills, diaphoresis, fatigue and fever  Gastrointestinal: Positive for abdominal pain  Negative for abdominal distention, nausea and vomiting  All other systems reviewed and are negative        Physical Exam  Physical Exam   Constitutional: He is oriented to person, place, and time  He appears well-developed and well-nourished  No distress  HENT:   Head: Normocephalic and atraumatic  Eyes: Pupils are equal, round, and reactive to light  Conjunctivae and EOM are normal    Neck: Normal range of motion  Neck supple  No JVD present  Cardiovascular: Normal rate, regular rhythm, normal heart sounds and intact distal pulses  Exam reveals no gallop and no friction rub  No murmur heard  Pulmonary/Chest: Effort normal and breath sounds normal  No stridor  No respiratory distress  He has no wheezes  He has no rales  Abdominal: Soft  He exhibits no distension and no mass  There is tenderness  There is no guarding  Mild epigastric and LUQ TTP  Musculoskeletal: Normal range of motion  He exhibits no edema, tenderness or deformity  Neurological: He is alert and oriented to person, place, and time  No cranial nerve deficit or sensory deficit  He exhibits normal muscle tone  Coordination normal    Skin: Skin is warm and dry  Capillary refill takes less than 2 seconds  He is not diaphoretic  Nursing note and vitals reviewed        Vital Signs  ED Triage Vitals [02/27/19 2336]   Temperature Pulse Respirations Blood Pressure SpO2   97 5 °F (36 4 °C) 89 18 (!) 176/104 98 %      Temp Source Heart Rate Source Patient Position - Orthostatic VS BP Location FiO2 (%)   Oral Monitor Lying Right arm --      Pain Score       8           Vitals:    02/27/19 2336 02/28/19 0100   BP: (!) 176/104 (!) 175/101   Pulse: 89 82   Patient Position - Orthostatic VS: Lying        Visual Acuity      ED Medications  Medications   morphine (PF) 10 mg/mL injection 8 mg (has no administration in time range)   morphine (PF) 10 mg/mL injection 6 mg (6 mg Intravenous Given 2/28/19 0240)   sodium chloride 0 9 % bolus 2,000 mL (2,000 mL Intravenous New Bag 2/28/19 0237)   iohexol (OMNIPAQUE) 350 MG/ML injection (MULTI-DOSE) 100 mL (100 mL Intravenous Given 2/28/19 0319)       Diagnostic Studies  Results Reviewed     Procedure Component Value Units Date/Time    Triglycerides [[id number]]  (Abnormal) Collected:  02/28/19 0251    Lab Status:  Final result Specimen:  Blood from Arm, Right Updated:  02/28/19 0345     Triglycerides >4,000 mg/dL     Basic metabolic panel [[id number]]  (Abnormal) Collected:  02/28/19 0129    Lab Status:  Final result Specimen:  Blood from Arm, Right Updated:  02/28/19 0220     Sodium 138 mmol/L      Potassium 4 5 mmol/L      Chloride 103 mmol/L      CO2 26 mmol/L      ANION GAP 9 mmol/L      BUN 10 mg/dL      Creatinine 0 75 mg/dL      Glucose 149 mg/dL      Calcium 9 2 mg/dL      eGFR 111 ml/min/1 73sq m     Narrative:       Specimen was ultracentrifuged for lipemia  National Kidney Disease Education Program recommendations are as follows:  GFR calculation is accurate only with a steady state creatinine  Chronic Kidney disease less than 60 ml/min/1 73 sq  meters  Kidney failure less than 15 ml/min/1 73 sq  meters      Hepatic function panel [[id number]]  (Abnormal) Collected:  02/28/19 0129    Lab Status:  Final result Specimen:  Blood from Arm, Right Updated:  02/28/19 0220     Total Bilirubin 0 70 mg/dL      Bilirubin, Direct 0 07 mg/dL      Alkaline Phosphatase 109 U/L       U/L       U/L      Total Protein 6 9 g/dL      Albumin 3 5 g/dL     Lipase [[id number]]  (Abnormal) Collected:  02/28/19 0129    Lab Status:  Final result Specimen:  Blood from Arm, Right Updated:  02/28/19 0220     Lipase 461 u/L     CBC and differential [[id number]]  (Abnormal) Collected:  02/28/19 0025    Lab Status:  Final result Specimen:  Blood from Arm, Right Updated:  02/28/19 0033     WBC 4 09 Thousand/uL      RBC 4 38 Million/uL      Hemoglobin 15 3 g/dL      Hematocrit 41 1 %      MCV 94 fL      MCH 34 9 pg      MCHC 37 2 g/dL      RDW 12 3 %      MPV 10 6 fL      Platelets 254 Thousands/uL      nRBC 0 /100 WBCs      Neutrophils Relative 56 %      Immat GRANS % 1 % Lymphocytes Relative 31 %      Monocytes Relative 9 %      Eosinophils Relative 2 %      Basophils Relative 1 %      Neutrophils Absolute 2 30 Thousands/µL      Immature Grans Absolute 0 03 Thousand/uL      Lymphocytes Absolute 1 28 Thousands/µL      Monocytes Absolute 0 38 Thousand/µL      Eosinophils Absolute 0 08 Thousand/µL      Basophils Absolute 0 02 Thousands/µL                  CT abdomen pelvis with contrast   Final Result by Marshall Storey MD (02/28 3064)      On the coronal images there is fat stranding superior to the pancreas body 601:93 which abuts the stomach and may be the cause of the patient's gastritis  Gastric wall thickening compatible colitis and proximal duodenal wall thickening compatible with duodenitis  This may be secondary to the above-mentioned pancreatitis  Workstation performed: [id number]                    Procedures  Procedures       Phone Contacts  ED Phone Contact    ED Course                               MDM  Number of Diagnoses or Management Options  Acute pancreatitis:   Hypertriglyceridemia:   Diagnosis management comments: Recurrent pancreatitis in setting of severely elevated triglycerides  Plan - IV fluids, NPO, pain control, admit          Amount and/or Complexity of Data Reviewed  Clinical lab tests: ordered and reviewed  Tests in the radiology section of CPT®: reviewed and ordered  Tests in the medicine section of CPT®: reviewed and ordered  Review and summarize past medical records: yes  Independent visualization of images, tracings, or specimens: yes        Disposition  Final diagnoses:   Acute pancreatitis   Hypertriglyceridemia     Time reflects when diagnosis was documented in both MDM as applicable and the Disposition within this note     Time User Action Codes Description Comment    2/28/2019  3:51 AM Dori Ugarte Add [K85 90] Acute pancreatitis     2/28/2019  3:51 AM Jose David Flores Add [E78 1] Hypertriglyceridemia       ED Disposition     ED Disposition Condition Date/Time Comment    Admit Stable Thu Feb 28, 2019  3:50 AM Case was discussed with CAREY and the patient's admission status was agreed to be Admission Status: inpatient status to the service of Dr Irving Byers   Follow-up Information    None         Patient's Medications   Discharge Prescriptions    No medications on file     No discharge procedures on file      ED Provider  Electronically Signed by           Liberty Vega MD  02/28/19 8318

## 2019-02-28 NOTE — ED NOTES
Informed patient that pain medicine is ordered every 4 hrs   next dose is due at 8:43am  Asked for increase in medication   will notify physician of patient request     Bisi Gaitan RN  02/28/19 8776

## 2019-02-28 NOTE — CONSULTS
IR Consult Note    HPI:  37year old male with acute on chronic pancreatitis and hypertriglyceridemia will require plasmapheresis and is referred for non-tunneled dialysis catheter placement  PMH:  Pancreatitis  GERD  HLD    PSH:  Celiac plexus block  Right rotator cuff repair    Physical exam:  Gen: NAD    Coags ok    A/P:  37year old male with acute on chronic pancreatitis and hypertriglyceridemia will require plasmapheresis  - Non-tunneled dialysis catheter placement

## 2019-03-01 LAB
ALBUMIN SERPL BCP-MCNC: 3.7 G/DL (ref 3.5–5)
ALP SERPL-CCNC: 31 U/L (ref 46–116)
ALT SERPL W P-5'-P-CCNC: 157 U/L (ref 12–78)
ANION GAP SERPL CALCULATED.3IONS-SCNC: 9 MMOL/L (ref 4–13)
AST SERPL W P-5'-P-CCNC: 58 U/L (ref 5–45)
BILIRUB SERPL-MCNC: 1.7 MG/DL (ref 0.2–1)
BUN SERPL-MCNC: 5 MG/DL (ref 5–25)
CALCIUM SERPL-MCNC: 8.5 MG/DL (ref 8.3–10.1)
CHLORIDE SERPL-SCNC: 106 MMOL/L (ref 100–108)
CO2 SERPL-SCNC: 27 MMOL/L (ref 21–32)
CREAT SERPL-MCNC: 0.64 MG/DL (ref 0.6–1.3)
ERYTHROCYTE [DISTWIDTH] IN BLOOD BY AUTOMATED COUNT: 12.8 % (ref 11.6–15.1)
GFR SERPL CREATININE-BSD FRML MDRD: 118 ML/MIN/1.73SQ M
GLUCOSE SERPL-MCNC: 100 MG/DL (ref 65–140)
HCT VFR BLD AUTO: 39.9 % (ref 36.5–49.3)
HGB BLD-MCNC: 13.4 G/DL (ref 12–17)
MCH RBC QN AUTO: 32 PG (ref 26.8–34.3)
MCHC RBC AUTO-ENTMCNC: 33.6 G/DL (ref 31.4–37.4)
MCV RBC AUTO: 95 FL (ref 82–98)
PLATELET # BLD AUTO: 90 THOUSANDS/UL (ref 149–390)
PMV BLD AUTO: 11.2 FL (ref 8.9–12.7)
POTASSIUM SERPL-SCNC: 3.6 MMOL/L (ref 3.5–5.3)
PROT SERPL-MCNC: 5.7 G/DL (ref 6.4–8.2)
RBC # BLD AUTO: 4.19 MILLION/UL (ref 3.88–5.62)
SODIUM SERPL-SCNC: 142 MMOL/L (ref 136–145)
TRIGL SERPL-MCNC: 409 MG/DL
WBC # BLD AUTO: 6.03 THOUSAND/UL (ref 4.31–10.16)

## 2019-03-01 PROCEDURE — C9113 INJ PANTOPRAZOLE SODIUM, VIA: HCPCS | Performed by: PHYSICIAN ASSISTANT

## 2019-03-01 PROCEDURE — 99232 SBSQ HOSP IP/OBS MODERATE 35: CPT | Performed by: INTERNAL MEDICINE

## 2019-03-01 PROCEDURE — 80053 COMPREHEN METABOLIC PANEL: CPT | Performed by: PHYSICIAN ASSISTANT

## 2019-03-01 PROCEDURE — 85027 COMPLETE CBC AUTOMATED: CPT | Performed by: PHYSICIAN ASSISTANT

## 2019-03-01 PROCEDURE — 84478 ASSAY OF TRIGLYCERIDES: CPT | Performed by: PHYSICIAN ASSISTANT

## 2019-03-01 RX ORDER — PREGABALIN 100 MG/1
100 CAPSULE ORAL 2 TIMES DAILY
Status: DISCONTINUED | OUTPATIENT
Start: 2019-03-01 | End: 2019-03-03 | Stop reason: HOSPADM

## 2019-03-01 RX ORDER — PANTOPRAZOLE SODIUM 40 MG/1
40 INJECTION, POWDER, FOR SOLUTION INTRAVENOUS EVERY 12 HOURS SCHEDULED
Status: DISCONTINUED | OUTPATIENT
Start: 2019-03-01 | End: 2019-03-03 | Stop reason: HOSPADM

## 2019-03-01 RX ORDER — PANTOPRAZOLE SODIUM 40 MG/1
40 INJECTION, POWDER, FOR SOLUTION INTRAVENOUS EVERY 12 HOURS SCHEDULED
Status: DISCONTINUED | OUTPATIENT
Start: 2019-03-01 | End: 2019-03-01

## 2019-03-01 RX ORDER — ATORVASTATIN CALCIUM 40 MG/1
80 TABLET, FILM COATED ORAL
Status: DISCONTINUED | OUTPATIENT
Start: 2019-03-01 | End: 2019-03-03 | Stop reason: HOSPADM

## 2019-03-01 RX ORDER — NIACIN 500 MG/1
1000 TABLET, EXTENDED RELEASE ORAL
Status: DISCONTINUED | OUTPATIENT
Start: 2019-03-01 | End: 2019-03-03 | Stop reason: HOSPADM

## 2019-03-01 RX ADMIN — HYDROMORPHONE HYDROCHLORIDE 1 MG: 1 INJECTION, SOLUTION INTRAMUSCULAR; INTRAVENOUS; SUBCUTANEOUS at 09:48

## 2019-03-01 RX ADMIN — HYDROMORPHONE HYDROCHLORIDE 1 MG: 1 INJECTION, SOLUTION INTRAMUSCULAR; INTRAVENOUS; SUBCUTANEOUS at 23:50

## 2019-03-01 RX ADMIN — HYDROMORPHONE HYDROCHLORIDE 1 MG: 1 INJECTION, SOLUTION INTRAMUSCULAR; INTRAVENOUS; SUBCUTANEOUS at 12:46

## 2019-03-01 RX ADMIN — NICOTINE 1 PATCH: 14 PATCH TRANSDERMAL at 09:52

## 2019-03-01 RX ADMIN — HYDROMORPHONE HYDROCHLORIDE 1 MG: 1 INJECTION, SOLUTION INTRAMUSCULAR; INTRAVENOUS; SUBCUTANEOUS at 16:36

## 2019-03-01 RX ADMIN — PANTOPRAZOLE SODIUM 40 MG: 40 INJECTION, POWDER, FOR SOLUTION INTRAVENOUS at 11:35

## 2019-03-01 RX ADMIN — SODIUM CHLORIDE, SODIUM LACTATE, POTASSIUM CHLORIDE, AND CALCIUM CHLORIDE 250 ML/HR: .6; .31; .03; .02 INJECTION, SOLUTION INTRAVENOUS at 11:36

## 2019-03-01 RX ADMIN — HYDROMORPHONE HYDROCHLORIDE 1 MG: 1 INJECTION, SOLUTION INTRAMUSCULAR; INTRAVENOUS; SUBCUTANEOUS at 06:24

## 2019-03-01 RX ADMIN — SODIUM CHLORIDE, SODIUM LACTATE, POTASSIUM CHLORIDE, AND CALCIUM CHLORIDE 250 ML/HR: .6; .31; .03; .02 INJECTION, SOLUTION INTRAVENOUS at 06:25

## 2019-03-01 RX ADMIN — PREGABALIN 100 MG: 100 CAPSULE ORAL at 18:01

## 2019-03-01 RX ADMIN — ONDANSETRON 4 MG: 2 INJECTION INTRAMUSCULAR; INTRAVENOUS at 16:35

## 2019-03-01 RX ADMIN — PREGABALIN 100 MG: 100 CAPSULE ORAL at 09:51

## 2019-03-01 RX ADMIN — ENOXAPARIN SODIUM 40 MG: 40 INJECTION SUBCUTANEOUS at 09:51

## 2019-03-01 RX ADMIN — HYDROMORPHONE HYDROCHLORIDE 1 MG: 1 INJECTION, SOLUTION INTRAMUSCULAR; INTRAVENOUS; SUBCUTANEOUS at 00:22

## 2019-03-01 RX ADMIN — ATORVASTATIN CALCIUM 80 MG: 40 TABLET, FILM COATED ORAL at 18:01

## 2019-03-01 RX ADMIN — HYDROMORPHONE HYDROCHLORIDE 1 MG: 1 INJECTION, SOLUTION INTRAMUSCULAR; INTRAVENOUS; SUBCUTANEOUS at 19:44

## 2019-03-01 RX ADMIN — PANTOPRAZOLE SODIUM 40 MG: 40 INJECTION, POWDER, FOR SOLUTION INTRAVENOUS at 21:51

## 2019-03-01 RX ADMIN — HYDROMORPHONE HYDROCHLORIDE 1 MG: 1 INJECTION, SOLUTION INTRAMUSCULAR; INTRAVENOUS; SUBCUTANEOUS at 03:20

## 2019-03-01 RX ADMIN — NIACIN 1000 MG: 500 TABLET, FILM COATED, EXTENDED RELEASE ORAL at 18:01

## 2019-03-01 RX ADMIN — SODIUM CHLORIDE, SODIUM LACTATE, POTASSIUM CHLORIDE, AND CALCIUM CHLORIDE 250 ML/HR: .6; .31; .03; .02 INJECTION, SOLUTION INTRAVENOUS at 19:46

## 2019-03-01 RX ADMIN — SODIUM CHLORIDE, SODIUM LACTATE, POTASSIUM CHLORIDE, AND CALCIUM CHLORIDE 250 ML/HR: .6; .31; .03; .02 INJECTION, SOLUTION INTRAVENOUS at 15:41

## 2019-03-01 NOTE — PROGRESS NOTES
[facility] Progress Note  Patient: Jonathan Smiley 37 y o  male   MRN: [id number]  PCP: LUANA Sneed  Unit/Bed#: -01 Encounter: [id number]  Date Of Visit: 19    Assessment:    Principal Problem:    Acute on chronic pancreatitis ([address] )  Active Problems:    Smoker    Hypertriglyceridemia    Chronic pain syndrome    GERD (gastroesophageal reflux disease)    Elevated blood pressure reading      Plan:    · 1  Acute on chronic pancreatitis secondary to familial hypertriglyceridemia- s/p plamaspharesis  Triglycerides improved from 4000 to 409  Will ask IR to remove HD catheter  GI following  Patient to restart diet and if tolerates, plan for dc in am   · 2  Elevated LFT's secondary to hypertriglyceridemia- improving  · 3  Chronic pain- patient gets nerve blocks as outpatient with Dr Blaine Crews       VTE Pharmacologic Prophylaxis:   Pharmacologic: Enoxaparin (Lovenox)  Mechanical VTE Prophylaxis in Place: Yes    Patient Centered Rounds: I have performed bedside rounds with nursing staff today  Discussions with Specialists or Other Care Team Provider:     Education and Discussions with Family / Patient:     Time Spent for Care: 20 minutes  More than 50% of total time spent on counseling and coordination of care as described above  Current Length of Stay: 1 day(s)    Current Patient Status: Inpatient   Certification Statement: The patient will continue to require additional inpatient hospital stay due to pancreatitis    Discharge Plan / Estimated Discharge Date: once pancreatitis improves    Code Status: Level 1 - Full Code      Subjective:   Patient seen and examined at bedside  Patient abdominal pain improving  Objective:     Vitals:   Temp (24hrs), Av 1 °F (36 7 °C), Min:98 1 °F (36 7 °C), Max:98 1 °F (36 7 °C)    Temp:  [98 1 °F (36 7 °C)] 98 1 °F (36 7 °C)  HR:  [92] 92  Resp:  [18] 18  BP: (138)/(90) 138/90  SpO2:  [97 %] 97 %  Body mass index is 30 23 kg/m²  Input and Output Summary (last 24 hours):     No intake or output data in the 24 hours ending 03/01/19 1524    Physical Exam:     Physical Exam   Constitutional: He is oriented to person, place, and time  He appears well-developed and well-nourished  HENT:   Head: Normocephalic and atraumatic  Eyes: Pupils are equal, round, and reactive to light  EOM are normal    Neck: Normal range of motion  Neck supple  No JVD present  No tracheal deviation present  No thyromegaly present  Cardiovascular: Normal rate, regular rhythm and normal heart sounds  Exam reveals no gallop and no friction rub  No murmur heard  Pulmonary/Chest: Effort normal and breath sounds normal  No stridor  No respiratory distress  He has no wheezes  Abdominal: Soft  Bowel sounds are normal  He exhibits no distension and no mass  There is tenderness  There is no guarding  Musculoskeletal: Normal range of motion  He exhibits no edema  Neurological: He is alert and oriented to person, place, and time  Skin: Skin is warm and dry  Vitals reviewed  Additional Data:     Labs:    Results from last 7 days   Lab Units 03/01/19  0930  02/28/19  0025   WBC Thousand/uL 6 03   < > 4 09*   HEMOGLOBIN g/dL 13 4   < > 15 3   HEMATOCRIT % 39 9   < > 41 1   PLATELETS Thousands/uL 90*   < > 125*   NEUTROS PCT %  --   --  56   LYMPHS PCT %  --   --  31   MONOS PCT %  --   --  9   EOS PCT %  --   --  2    < > = values in this interval not displayed  Results from last 7 days   Lab Units 03/01/19  0930   POTASSIUM mmol/L 3 6   CHLORIDE mmol/L 106   CO2 mmol/L 27   BUN mg/dL 5   CREATININE mg/dL 0 64   CALCIUM mg/dL 8 5   ALK PHOS U/L 31*   ALT U/L 157*   AST U/L 58*     Results from last 7 days   Lab Units 02/28/19  1147   INR  0 94       * I Have Reviewed All Lab Data Listed Above  * Additional Pertinent Lab Tests Reviewed:  All Labs For Current Hospital Admission Reviewed    Imaging:    Imaging Reports Reviewed Today Include:   Imaging Personally Reviewed by Myself Includes:      Recent Cultures (last 7 days):           Last 24 Hours Medication List:     Current Facility-Administered Medications:  albuterol 2 puff Inhalation Q6H PRN Brooklynn Edge Ofoegbcaron, PA-TAYLOR    atorvastatin 80 mg Oral Daily With Dinner Dinora E Makenna, PA-C    calcium gluconate 1 g Intravenous Q1H PRN Dinora E Makenna, PA-TAYLOR    enoxaparin 40 mg Subcutaneous Daily Berna N Ofoegbcaron, PA-C    hydrALAZINE 5 mg Intravenous Q6H PRN Mcfaddin Edge Ofoegbu, PA-C    HYDROmorphone 1 mg Intravenous Q3H PRN Orvilla Jay CRNP    lactated ringers 250 mL/hr Intravenous Continuous Tia Andrews PA-C Last Rate: 250 mL/hr (03/01/19 1136)   niacin 1,000 mg Oral Daily With Dinner Dinora E BROOKLYNN Mensah-C    nicotine 1 patch Transdermal Daily Berna N Ofoegbcaron, PA-C    ondansetron 4 mg Intravenous Q6H PRN Brooklynn Edge Ofoegbu, PA-C    pantoprazole 40 mg Intravenous Q12H [address] Dinora E BROOKLYNN Mensah-C    pregabalin 100 mg Oral BID Dinora Mensah PA-C      Facility-Administered Medications Ordered in Other Encounters:  oxyCODONE-acetaminophen 2 tablet Oral Once Pranay Nava MD        Today, Patient Was Seen By: Luis Alberto Nelson MD    ** Please Note: This note has been constructed using a voice recognition system   **

## 2019-03-01 NOTE — PROGRESS NOTES
GI Progress Note - Buffy Taylor 37 y o  male MRN: [id number]    Unit/Bed#: -01 Encounter: [id number]    Subjective:  He reports significant improvement in his abdominal pain today compared to yesterday  He underwent plasma exchange yesterday  He is passing gas today, no bowel movements during the admission  He wants to try some clear liquids  Objective:     Vitals: Blood pressure 138/90, pulse 92, temperature 98 1 °F (36 7 °C), resp  rate 18, height 5' 11" (1 803 m), weight 98 3 kg (216 lb 11 4 oz), SpO2 97 %  ,Body mass index is 30 23 kg/m²  No intake or output data in the 24 hours ending 03/01/19 1317    Physical Exam:     General Appearance: Alert, oriented x3, no acute distress  Lungs: Clear to auscultation bilaterally, no respiratory distress  Heart: RRR, no murmur  Abdomen: Non-distended, soft, BS active, (+) mild generalized TTP  Extremities: No cyanosis or LE edema    Invasive Devices     Peripheral Intravenous Line            Peripheral IV 02/27/19 Right Antecubital 1 day                Lab Results:  Results from last 7 days   Lab Units 03/01/19  0930  02/28/19  0025   WBC Thousand/uL 6 03   < > 4 09*   HEMOGLOBIN g/dL 13 4   < > 15 3   HEMATOCRIT % 39 9   < > 41 1   PLATELETS Thousands/uL 90*   < > 125*   NEUTROS PCT %  --   --  56   LYMPHS PCT %  --   --  31   MONOS PCT %  --   --  9   EOS PCT %  --   --  2    < > = values in this interval not displayed  Results from last 7 days   Lab Units 03/01/19  0930   POTASSIUM mmol/L 3 6   CHLORIDE mmol/L 106   CO2 mmol/L 27   BUN mg/dL 5   CREATININE mg/dL 0 64   CALCIUM mg/dL 8 5   ALK PHOS U/L 31*   ALT U/L 157*   AST U/L 58*     Results from last 7 days   Lab Units 02/28/19  1147   INR  0 94     Results from last 7 days   Lab Units 02/28/19  0449   LIPASE u/L 267       Imaging Studies: I have personally reviewed pertinent imaging studies        Ct Abdomen Pelvis With Contrast  Result Date: 2/28/2019  Impression: On the coronal images there is fat stranding superior to the pancreas body 601:93 which abuts the stomach and may be the cause of the patient's gastritis  Gastric wall thickening compatible colitis and proximal duodenal wall thickening compatible with duodenitis  This may be secondary to the above-mentioned pancreatitis  Ir Temp Hd Cath  Result Date: 2/28/2019  Impression: Impression: Successful nontunneled hemodialysis catheter placement through right IJV access  Assessment and Plan:     Acute Pancreatitis  Hypertriglyceridemia  - TG >4000 yesterday s/p plasma exchange yesterday via HD cath placed by IR  -  this morning, no further plasma exchange needed  - Abdominal pain is improving, continue serial abdominal exams  - Trial clear liquid diet  - Remove HD catheter  - Resume statin and niacin, will need to consider starting Vascepa on discharge for further control of TG  - Continue aggressive IVF hydration with LR at 250 ml/hr  - Recheck TG in the AM  - Antiemetics and pain medications PRN  - He should have a referral to endocrinology as outpatient for further assistance with controlling his TG  - Complete alcohol abstinence and smoking cessation encouraged      Transaminitis  - Suspect this may have been mild ischemia from hypertriglyceridemia which is now improving s/p plasma exchange and improvement in TG  - Continue to trend LFTs      The patient will be seen by Dr Harini Monsivais

## 2019-03-02 LAB
ANION GAP SERPL CALCULATED.3IONS-SCNC: 8 MMOL/L (ref 4–13)
BASOPHILS # BLD AUTO: 0.01 THOUSANDS/ΜL (ref 0–0.1)
BASOPHILS NFR BLD AUTO: 0 % (ref 0–1)
BUN SERPL-MCNC: 7 MG/DL (ref 5–25)
CALCIUM SERPL-MCNC: 8.7 MG/DL (ref 8.3–10.1)
CHLORIDE SERPL-SCNC: 106 MMOL/L (ref 100–108)
CO2 SERPL-SCNC: 27 MMOL/L (ref 21–32)
CREAT SERPL-MCNC: 0.63 MG/DL (ref 0.6–1.3)
EOSINOPHIL # BLD AUTO: 0.13 THOUSAND/ΜL (ref 0–0.61)
EOSINOPHIL NFR BLD AUTO: 3 % (ref 0–6)
ERYTHROCYTE [DISTWIDTH] IN BLOOD BY AUTOMATED COUNT: 12.9 % (ref 11.6–15.1)
GFR SERPL CREATININE-BSD FRML MDRD: 119 ML/MIN/1.73SQ M
GLUCOSE SERPL-MCNC: 130 MG/DL (ref 65–140)
HCT VFR BLD AUTO: 36.9 % (ref 36.5–49.3)
HGB BLD-MCNC: 12.2 G/DL (ref 12–17)
IMM GRANULOCYTES # BLD AUTO: 0.02 THOUSAND/UL (ref 0–0.2)
IMM GRANULOCYTES NFR BLD AUTO: 0 % (ref 0–2)
LYMPHOCYTES # BLD AUTO: 1.15 THOUSANDS/ΜL (ref 0.6–4.47)
LYMPHOCYTES NFR BLD AUTO: 25 % (ref 14–44)
MCH RBC QN AUTO: 32 PG (ref 26.8–34.3)
MCHC RBC AUTO-ENTMCNC: 33.1 G/DL (ref 31.4–37.4)
MCV RBC AUTO: 97 FL (ref 82–98)
MONOCYTES # BLD AUTO: 0.46 THOUSAND/ΜL (ref 0.17–1.22)
MONOCYTES NFR BLD AUTO: 10 % (ref 4–12)
NEUTROPHILS # BLD AUTO: 2.85 THOUSANDS/ΜL (ref 1.85–7.62)
NEUTS SEG NFR BLD AUTO: 62 % (ref 43–75)
NRBC BLD AUTO-RTO: 0 /100 WBCS
PLATELET # BLD AUTO: 80 THOUSANDS/UL (ref 149–390)
PMV BLD AUTO: 11.5 FL (ref 8.9–12.7)
POTASSIUM SERPL-SCNC: 3.7 MMOL/L (ref 3.5–5.3)
RBC # BLD AUTO: 3.81 MILLION/UL (ref 3.88–5.62)
SODIUM SERPL-SCNC: 141 MMOL/L (ref 136–145)
TRIGL SERPL-MCNC: 263 MG/DL
WBC # BLD AUTO: 4.62 THOUSAND/UL (ref 4.31–10.16)

## 2019-03-02 PROCEDURE — 99232 SBSQ HOSP IP/OBS MODERATE 35: CPT | Performed by: INTERNAL MEDICINE

## 2019-03-02 PROCEDURE — 84478 ASSAY OF TRIGLYCERIDES: CPT | Performed by: INTERNAL MEDICINE

## 2019-03-02 PROCEDURE — 85025 COMPLETE CBC W/AUTO DIFF WBC: CPT | Performed by: INTERNAL MEDICINE

## 2019-03-02 PROCEDURE — 80048 BASIC METABOLIC PNL TOTAL CA: CPT | Performed by: INTERNAL MEDICINE

## 2019-03-02 PROCEDURE — C9113 INJ PANTOPRAZOLE SODIUM, VIA: HCPCS | Performed by: PHYSICIAN ASSISTANT

## 2019-03-02 RX ORDER — ALBUTEROL SULFATE 90 UG/1
2 AEROSOL, METERED RESPIRATORY (INHALATION) EVERY 4 HOURS PRN
Status: DISCONTINUED | OUTPATIENT
Start: 2019-03-02 | End: 2019-03-03 | Stop reason: HOSPADM

## 2019-03-02 RX ADMIN — ATORVASTATIN CALCIUM 80 MG: 40 TABLET, FILM COATED ORAL at 17:10

## 2019-03-02 RX ADMIN — SODIUM CHLORIDE, SODIUM LACTATE, POTASSIUM CHLORIDE, AND CALCIUM CHLORIDE 250 ML/HR: .6; .31; .03; .02 INJECTION, SOLUTION INTRAVENOUS at 07:43

## 2019-03-02 RX ADMIN — SODIUM CHLORIDE, SODIUM LACTATE, POTASSIUM CHLORIDE, AND CALCIUM CHLORIDE 250 ML/HR: .6; .31; .03; .02 INJECTION, SOLUTION INTRAVENOUS at 03:39

## 2019-03-02 RX ADMIN — PREGABALIN 100 MG: 100 CAPSULE ORAL at 17:10

## 2019-03-02 RX ADMIN — PANTOPRAZOLE SODIUM 40 MG: 40 INJECTION, POWDER, FOR SOLUTION INTRAVENOUS at 08:49

## 2019-03-02 RX ADMIN — NICOTINE 1 PATCH: 14 PATCH TRANSDERMAL at 08:50

## 2019-03-02 RX ADMIN — HYDROMORPHONE HYDROCHLORIDE 1 MG: 1 INJECTION, SOLUTION INTRAMUSCULAR; INTRAVENOUS; SUBCUTANEOUS at 23:25

## 2019-03-02 RX ADMIN — SODIUM CHLORIDE, SODIUM LACTATE, POTASSIUM CHLORIDE, AND CALCIUM CHLORIDE 250 ML/HR: .6; .31; .03; .02 INJECTION, SOLUTION INTRAVENOUS at 16:19

## 2019-03-02 RX ADMIN — SODIUM CHLORIDE, SODIUM LACTATE, POTASSIUM CHLORIDE, AND CALCIUM CHLORIDE 250 ML/HR: .6; .31; .03; .02 INJECTION, SOLUTION INTRAVENOUS at 12:15

## 2019-03-02 RX ADMIN — PANTOPRAZOLE SODIUM 40 MG: 40 INJECTION, POWDER, FOR SOLUTION INTRAVENOUS at 21:33

## 2019-03-02 RX ADMIN — ONDANSETRON 4 MG: 2 INJECTION INTRAMUSCULAR; INTRAVENOUS at 10:56

## 2019-03-02 RX ADMIN — HYDROMORPHONE HYDROCHLORIDE 1 MG: 1 INJECTION, SOLUTION INTRAMUSCULAR; INTRAVENOUS; SUBCUTANEOUS at 13:50

## 2019-03-02 RX ADMIN — SODIUM CHLORIDE, SODIUM LACTATE, POTASSIUM CHLORIDE, AND CALCIUM CHLORIDE 250 ML/HR: .6; .31; .03; .02 INJECTION, SOLUTION INTRAVENOUS at 20:17

## 2019-03-02 RX ADMIN — SODIUM CHLORIDE, SODIUM LACTATE, POTASSIUM CHLORIDE, AND CALCIUM CHLORIDE 250 ML/HR: .6; .31; .03; .02 INJECTION, SOLUTION INTRAVENOUS at 00:09

## 2019-03-02 RX ADMIN — HYDROMORPHONE HYDROCHLORIDE 1 MG: 1 INJECTION, SOLUTION INTRAMUSCULAR; INTRAVENOUS; SUBCUTANEOUS at 10:55

## 2019-03-02 RX ADMIN — HYDROMORPHONE HYDROCHLORIDE 1 MG: 1 INJECTION, SOLUTION INTRAMUSCULAR; INTRAVENOUS; SUBCUTANEOUS at 03:37

## 2019-03-02 RX ADMIN — HYDROMORPHONE HYDROCHLORIDE 1 MG: 1 INJECTION, SOLUTION INTRAMUSCULAR; INTRAVENOUS; SUBCUTANEOUS at 17:10

## 2019-03-02 RX ADMIN — NIACIN 1000 MG: 500 TABLET, FILM COATED, EXTENDED RELEASE ORAL at 17:12

## 2019-03-02 RX ADMIN — HYDROMORPHONE HYDROCHLORIDE 1 MG: 1 INJECTION, SOLUTION INTRAMUSCULAR; INTRAVENOUS; SUBCUTANEOUS at 07:43

## 2019-03-02 RX ADMIN — HYDROMORPHONE HYDROCHLORIDE 1 MG: 1 INJECTION, SOLUTION INTRAMUSCULAR; INTRAVENOUS; SUBCUTANEOUS at 20:16

## 2019-03-02 RX ADMIN — PREGABALIN 100 MG: 100 CAPSULE ORAL at 08:51

## 2019-03-02 NOTE — PROGRESS NOTES
[facility] Progress Note  Patient: Jaden Gutierrez 37 y o  male   MRN: [id number]  PCP: LUANA Lord  Unit/Bed#: -01 Encounter: [id number]  Date Of Visit: 19    Assessment:    Principal Problem:    Acute on chronic pancreatitis ([address] )  Active Problems:    Smoker    Hypertriglyceridemia    Chronic pain syndrome    GERD (gastroesophageal reflux disease)    Elevated blood pressure reading      Plan:    · 1  Acute on chronic pancreatitis secondary to familial hypertriglyceridemia- s/p plamaspharesis  Triglycerides improved from 4000 to 263  GI following  If patient tolerates diet, plan for dc in am   · 2  Elevated LFT's secondary ischemic hepatopathy in the setting of hypertriglyceridemia- improving  · 3  Chronic pain- patient gets nerve blocks as outpatient with Dr Molly Correia       VTE Pharmacologic Prophylaxis:   Pharmacologic: Enoxaparin (Lovenox)  Mechanical VTE Prophylaxis in Place: Yes    Patient Centered Rounds: I have performed bedside rounds with nursing staff today  Discussions with Specialists or Other Care Team Provider:     Education and Discussions with Family / Patient:     Time Spent for Care: 20 minutes  More than 50% of total time spent on counseling and coordination of care as described above  Current Length of Stay: 2 day(s)    Current Patient Status: Inpatient   Certification Statement: The patient will continue to require additional inpatient hospital stay due to pancreatitis    Discharge Plan / Estimated Discharge Date: once pancreatitis improves    Code Status: Level 1 - Full Code      Subjective:   Patient seen and examined at bedside  Patient abdominal pain improving  Objective:     Vitals:   Temp (24hrs), Av 7 °F (36 5 °C), Min:97 7 °F (36 5 °C), Max:97 7 °F (36 5 °C)    Temp:  [97 7 °F (36 5 °C)] 97 7 °F (36 5 °C)  HR:  [73-85] 85  Resp:  [18] 18  BP: (132)/(64) 132/64  SpO2:  [95 %-96 %] 95 %  Body mass index is 30 23 kg/m²       Input and Output Summary (last 24 hours): Intake/Output Summary (Last 24 hours) at 3/2/2019 1450  Last data filed at 3/2/2019 1215  Gross per 24 hour   Intake 5480 83 ml   Output    Net 5480 83 ml       Physical Exam:     Physical Exam   Constitutional: He is oriented to person, place, and time  He appears well-developed and well-nourished  HENT:   Head: Normocephalic and atraumatic  Eyes: Pupils are equal, round, and reactive to light  EOM are normal    Neck: Normal range of motion  Neck supple  No JVD present  No tracheal deviation present  No thyromegaly present  Cardiovascular: Normal rate, regular rhythm and normal heart sounds  Exam reveals no gallop and no friction rub  No murmur heard  Pulmonary/Chest: Effort normal and breath sounds normal  No stridor  No respiratory distress  He has no wheezes  Abdominal: Soft  Bowel sounds are normal  He exhibits no distension and no mass  There is tenderness  There is no guarding  Musculoskeletal: Normal range of motion  He exhibits no edema  Neurological: He is alert and oriented to person, place, and time  Skin: Skin is warm and dry  Vitals reviewed  Additional Data:     Labs:    Results from last 7 days   Lab Units 03/02/19  0455   WBC Thousand/uL 4 62   HEMOGLOBIN g/dL 12 2   HEMATOCRIT % 36 9   PLATELETS Thousands/uL 80*   NEUTROS PCT % 62   LYMPHS PCT % 25   MONOS PCT % 10   EOS PCT % 3     Results from last 7 days   Lab Units 03/02/19  0455 03/01/19  0930   POTASSIUM mmol/L 3 7 3 6   CHLORIDE mmol/L 106 106   CO2 mmol/L 27 27   BUN mg/dL 7 5   CREATININE mg/dL 0 63 0 64   CALCIUM mg/dL 8 7 8 5   ALK PHOS U/L  --  31*   ALT U/L  --  157*   AST U/L  --  58*     Results from last 7 days   Lab Units 02/28/19  1147   INR  0 94       * I Have Reviewed All Lab Data Listed Above  * Additional Pertinent Lab Tests Reviewed:  All Labs For Current Hospital Admission Reviewed    Imaging:    Imaging Reports Reviewed Today Include:   Imaging Personally Reviewed by Myself Includes:      Recent Cultures (last 7 days):           Last 24 Hours Medication List:     Current Facility-Administered Medications:  albuterol 2 puff Inhalation Q4H PRN Natna Thomas MD    atorvastatin 80 mg Oral Daily With Dinner Dinora Mensah PA-C    calcium gluconate 1 g Intravenous Q1H PRN Dinora SYEDA Mensah PA-C    enoxaparin 40 mg Subcutaneous Daily Berna Bourgeois PA-C    hydrALAZINE 5 mg Intravenous Q6H PRN Virginia Moriah Bourgeois PA-C    HYDROmorphone 1 mg Intravenous Q3H PRN Lisa Falling, CRNP    lactated ringers 250 mL/hr Intravenous Continuous Rio Sehppard PA-C Last Rate: 250 mL/hr (03/02/19 1215)   niacin 1,000 mg Oral Daily With Dinner Dinora E RAY Mensah    nicotine 1 patch Transdermal Daily Virginia Moriah Bourgeois PA-C    ondansetron 4 mg Intravenous Q6H PRN Virginia Moriah Bourgeois PA-C    pantoprazole 40 mg Intravenous Q12H [facility] E MensahRAY    pregabalin 100 mg Oral BID Dinora E RAY Mensah      Facility-Administered Medications Ordered in Other Encounters:  oxyCODONE-acetaminophen 2 tablet Oral Once Joaquim Leyva MD        Today, Patient Was Seen By: Natan Thomas MD    ** Please Note: This note has been constructed using a voice recognition system   **

## 2019-03-03 VITALS
DIASTOLIC BLOOD PRESSURE: 89 MMHG | TEMPERATURE: 98.1 F | RESPIRATION RATE: 16 BRPM | SYSTOLIC BLOOD PRESSURE: 140 MMHG | HEIGHT: 71 IN | WEIGHT: 216.71 LBS | BODY MASS INDEX: 30.34 KG/M2 | HEART RATE: 64 BPM | OXYGEN SATURATION: 96 %

## 2019-03-03 LAB
ANION GAP SERPL CALCULATED.3IONS-SCNC: 8 MMOL/L (ref 4–13)
BASOPHILS # BLD AUTO: 0.02 THOUSANDS/ΜL (ref 0–0.1)
BASOPHILS NFR BLD AUTO: 1 % (ref 0–1)
BUN SERPL-MCNC: 5 MG/DL (ref 5–25)
CALCIUM SERPL-MCNC: 9 MG/DL (ref 8.3–10.1)
CHLORIDE SERPL-SCNC: 105 MMOL/L (ref 100–108)
CO2 SERPL-SCNC: 28 MMOL/L (ref 21–32)
CREAT SERPL-MCNC: 0.76 MG/DL (ref 0.6–1.3)
EOSINOPHIL # BLD AUTO: 0.1 THOUSAND/ΜL (ref 0–0.61)
EOSINOPHIL NFR BLD AUTO: 3 % (ref 0–6)
ERYTHROCYTE [DISTWIDTH] IN BLOOD BY AUTOMATED COUNT: 12.7 % (ref 11.6–15.1)
GFR SERPL CREATININE-BSD FRML MDRD: 110 ML/MIN/1.73SQ M
GLUCOSE SERPL-MCNC: 117 MG/DL (ref 65–140)
HCT VFR BLD AUTO: 36 % (ref 36.5–49.3)
HGB BLD-MCNC: 11.9 G/DL (ref 12–17)
IMM GRANULOCYTES # BLD AUTO: 0.04 THOUSAND/UL (ref 0–0.2)
IMM GRANULOCYTES NFR BLD AUTO: 1 % (ref 0–2)
LYMPHOCYTES # BLD AUTO: 1.33 THOUSANDS/ΜL (ref 0.6–4.47)
LYMPHOCYTES NFR BLD AUTO: 34 % (ref 14–44)
MCH RBC QN AUTO: 32.2 PG (ref 26.8–34.3)
MCHC RBC AUTO-ENTMCNC: 33.1 G/DL (ref 31.4–37.4)
MCV RBC AUTO: 98 FL (ref 82–98)
MONOCYTES # BLD AUTO: 0.38 THOUSAND/ΜL (ref 0.17–1.22)
MONOCYTES NFR BLD AUTO: 10 % (ref 4–12)
NEUTROPHILS # BLD AUTO: 2.05 THOUSANDS/ΜL (ref 1.85–7.62)
NEUTS SEG NFR BLD AUTO: 51 % (ref 43–75)
NRBC BLD AUTO-RTO: 0 /100 WBCS
PLATELET # BLD AUTO: 84 THOUSANDS/UL (ref 149–390)
PMV BLD AUTO: 11.8 FL (ref 8.9–12.7)
POTASSIUM SERPL-SCNC: 3.9 MMOL/L (ref 3.5–5.3)
RBC # BLD AUTO: 3.69 MILLION/UL (ref 3.88–5.62)
SODIUM SERPL-SCNC: 141 MMOL/L (ref 136–145)
WBC # BLD AUTO: 3.92 THOUSAND/UL (ref 4.31–10.16)

## 2019-03-03 PROCEDURE — 99239 HOSP IP/OBS DSCHRG MGMT >30: CPT | Performed by: INTERNAL MEDICINE

## 2019-03-03 PROCEDURE — 85025 COMPLETE CBC W/AUTO DIFF WBC: CPT | Performed by: INTERNAL MEDICINE

## 2019-03-03 PROCEDURE — C9113 INJ PANTOPRAZOLE SODIUM, VIA: HCPCS | Performed by: PHYSICIAN ASSISTANT

## 2019-03-03 PROCEDURE — 80048 BASIC METABOLIC PNL TOTAL CA: CPT | Performed by: INTERNAL MEDICINE

## 2019-03-03 RX ORDER — OXYCODONE HYDROCHLORIDE 5 MG/1
5 TABLET ORAL EVERY 4 HOURS PRN
Qty: 30 TABLET | Refills: 0 | Status: SHIPPED | OUTPATIENT
Start: 2019-03-03 | End: 2019-03-13

## 2019-03-03 RX ADMIN — NICOTINE 1 PATCH: 14 PATCH TRANSDERMAL at 08:22

## 2019-03-03 RX ADMIN — PREGABALIN 100 MG: 100 CAPSULE ORAL at 08:23

## 2019-03-03 RX ADMIN — HYDROMORPHONE HYDROCHLORIDE 1 MG: 1 INJECTION, SOLUTION INTRAMUSCULAR; INTRAVENOUS; SUBCUTANEOUS at 08:00

## 2019-03-03 RX ADMIN — HYDROMORPHONE HYDROCHLORIDE 1 MG: 1 INJECTION, SOLUTION INTRAMUSCULAR; INTRAVENOUS; SUBCUTANEOUS at 11:07

## 2019-03-03 RX ADMIN — HYDROMORPHONE HYDROCHLORIDE 1 MG: 1 INJECTION, SOLUTION INTRAMUSCULAR; INTRAVENOUS; SUBCUTANEOUS at 02:36

## 2019-03-03 RX ADMIN — SODIUM CHLORIDE, SODIUM LACTATE, POTASSIUM CHLORIDE, AND CALCIUM CHLORIDE 250 ML/HR: .6; .31; .03; .02 INJECTION, SOLUTION INTRAVENOUS at 04:11

## 2019-03-03 RX ADMIN — PANTOPRAZOLE SODIUM 40 MG: 40 INJECTION, POWDER, FOR SOLUTION INTRAVENOUS at 08:22

## 2019-03-03 RX ADMIN — SODIUM CHLORIDE, SODIUM LACTATE, POTASSIUM CHLORIDE, AND CALCIUM CHLORIDE 250 ML/HR: .6; .31; .03; .02 INJECTION, SOLUTION INTRAVENOUS at 10:29

## 2019-03-03 RX ADMIN — ENOXAPARIN SODIUM 40 MG: 40 INJECTION SUBCUTANEOUS at 08:22

## 2019-03-03 RX ADMIN — SODIUM CHLORIDE, SODIUM LACTATE, POTASSIUM CHLORIDE, AND CALCIUM CHLORIDE 250 ML/HR: .6; .31; .03; .02 INJECTION, SOLUTION INTRAVENOUS at 00:17

## 2019-03-03 NOTE — DISCHARGE SUMMARY
Discharge Summary - [facility]    Patient Information: Angelika Morgan 37 y o  male MRN: [id number]  Unit/Bed#: -01 Encounter: [id number]    Discharging Physician / Practitioner: Yady Acharya MD  PCP: Cynthia Shaw [address] [place] [address]  Admission Date: 2/27/2019  Discharge Date: 03/03/19    Disposition:     Home    Reason for Admission: Abdominal pain    Discharge Diagnoses:     Principal Problem:    Acute on chronic pancreatitis ([address] )  Active Problems:    Smoker    Hypertriglyceridemia    Chronic pain syndrome    GERD (gastroesophageal reflux disease)    Elevated blood pressure reading  Resolved Problems:    * No resolved hospital problems  *      Consultations During Hospital Stay:  · GI    Procedures Performed:     · Plasmapharesis    Significant Findings / Test Results:     · CT abdomen pelvis with contrast  Impression:       On the coronal images there is fat stranding superior to the pancreas body 601:93 which abuts the stomach and may be the cause of the patient's gastritis  Gastric wall thickening compatible colitis and proximal duodenal wall thickening compatible with duodenitis   This may be secondary to the above-mentioned pancreatitis  ·     Incidental Findings:   ·      Test Results Pending at Discharge (will require follow up):   ·      Outpatient Tests Requested:  ·     Complications:  none    Hospital Course:     Angelika Morgan is a 37 y o  male patient who originally presented to the hospital on 2/27/2019 due to complaints of abdominal pain  He was found to have acute on chronic pancreatitis due to hypertriglyceridemia and was given plasmapheresis  His triglyceride improved and his abdominal pain improved  Patient is tolerating his diet and will be discharged home  Patient to followup with GI as outpatient  Condition at Discharge: stable     Discharge Day Visit / Exam:     Subjective:  Patient seen and examined at bedside  Patient has no new complaints    Vitals: Blood Pressure: 140/89 (03/02/19 1555)  Pulse: 64 (03/02/19 1557)  Temperature: 98 1 °F (36 7 °C) (03/02/19 1555)  Temp Source: Oral (02/27/19 2336)  Respirations: 16 (03/02/19 1555)  Height: 5' 11" (180 3 cm) (02/28/19 1000)  Weight - Scale: 98 3 kg (216 lb 11 4 oz) (02/28/19 1000)  SpO2: 96 % (03/02/19 1557)  Exam:   Physical Exam   Constitutional: He is oriented to person, place, and time  He appears well-developed and well-nourished  HENT:   Head: Normocephalic and atraumatic  Eyes: Pupils are equal, round, and reactive to light  EOM are normal    Neck: Normal range of motion  Neck supple  No JVD present  No tracheal deviation present  No thyromegaly present  Cardiovascular: Normal rate, regular rhythm and normal heart sounds  Exam reveals no gallop and no friction rub  No murmur heard  Pulmonary/Chest: Effort normal and breath sounds normal  No stridor  No respiratory distress  Abdominal: Bowel sounds are normal  He exhibits no distension and no mass  There is tenderness  There is no guarding  Musculoskeletal: Normal range of motion  He exhibits no edema  Neurological: He is alert and oriented to person, place, and time  Skin: Skin is warm and dry  Discussion with Family:     Discharge instructions/Information to patient and family:   See after visit summary for information provided to patient and family  Provisions for Follow-Up Care:  See after visit summary for information related to follow-up care and any pertinent home health orders  Planned Readmission: none     Discharge Statement:  I spent 50 minutes discharging the patient  This time was spent on the day of discharge  I had direct contact with the patient on the day of discharge  Greater than 50% of the total time was spent examining patient, answering all patient questions, arranging and discussing plan of care with patient as well as directly providing post-discharge instructions    Additional time then spent on discharge activities  Discharge Medications:  See after visit summary for reconciled discharge medications provided to patient and family        ** Please Note: This note has been constructed using a voice recognition system **

## 2019-03-03 NOTE — PROGRESS NOTES
Patient is discharged  Patient was already given discharge documents and prescription  Patient is waiting for his wife for ride home

## 2019-03-04 ENCOUNTER — TRANSITIONAL CARE MANAGEMENT (OUTPATIENT)
Dept: FAMILY MEDICINE CLINIC | Facility: CLINIC | Age: 44
End: 2019-03-04

## 2019-03-04 NOTE — UTILIZATION REVIEW
Notification of Discharge  This is a Notification of Discharge from our facility [address]  Please be advised that this patient has been discharge from our facility  Below you will find the admission and discharge date and time including the patients disposition  PRESENTATION DATE: 2/27/2019 11:31 PM  IP ADMISSION DATE: 2/28/19 0347  DISCHARGE DATE: 3/3/2019  2:26 PM  DISPOSITION: [facility] Utilization Review Department  Phone: [phone]; Fax [phone]  [email or web address]  ATTENTION: Please call with any questions or concerns to [phone]  and carefully listen to the prompts so that you are directed to the right person  Send all requests for admission clinical reviews, approved or denied determinations and any other requests to fax [phone]   All voicemails are confidential

## 2019-03-14 ENCOUNTER — OFFICE VISIT (OUTPATIENT)
Dept: FAMILY MEDICINE CLINIC | Facility: CLINIC | Age: 44
End: 2019-03-14
Payer: COMMERCIAL

## 2019-03-14 VITALS
BODY MASS INDEX: 30.24 KG/M2 | HEIGHT: 71 IN | HEART RATE: 75 BPM | TEMPERATURE: 96.7 F | WEIGHT: 216 LBS | OXYGEN SATURATION: 97 % | DIASTOLIC BLOOD PRESSURE: 66 MMHG | SYSTOLIC BLOOD PRESSURE: 132 MMHG

## 2019-03-14 DIAGNOSIS — K85.90 ACUTE ON CHRONIC PANCREATITIS (HCC): Primary | ICD-10-CM

## 2019-03-14 DIAGNOSIS — K86.1 ACUTE ON CHRONIC PANCREATITIS (HCC): Primary | ICD-10-CM

## 2019-03-14 DIAGNOSIS — Z71.6 ENCOUNTER FOR SMOKING CESSATION COUNSELING: ICD-10-CM

## 2019-03-14 DIAGNOSIS — G89.4 CHRONIC PAIN SYNDROME: ICD-10-CM

## 2019-03-14 PROCEDURE — 99496 TRANSJ CARE MGMT HIGH F2F 7D: CPT | Performed by: FAMILY MEDICINE

## 2019-03-14 RX ORDER — NICOTINE 21 MG/24HR
1 PATCH, TRANSDERMAL 24 HOURS TRANSDERMAL EVERY 24 HOURS
Qty: 28 PATCH | Refills: 0 | Status: SHIPPED | OUTPATIENT
Start: 2019-03-14 | End: 2019-09-20

## 2019-03-14 NOTE — PROGRESS NOTES
Assessment/Plan:     No problem-specific Assessment & Plan notes found for this encounter  Diagnoses and all orders for this visit:    Acute on chronic pancreatitis (HCC)    Chronic pain syndrome     Acute pancreatitis secondary hypertriglyceridemia  Currently stable has follow-up appointments GI and Cardiology    Chronic pain syndrome  Id currently under care Dr Leandro Alcantara it easy    Smoking cessation program  Discussed plan of care instructions on use of patches decreasing over course of call for additional          Subjective:     Patient ID: Judy Navarro is a 53 y o  male  Here to f/u on hosp   Pancreatitis ,   Started rather quickly this time while at work , just after eating a salad , pain came on so quick this time ,   It was recently found that Alida Cohen could be casuing the issue ,   They are just not sure  Why   It was reccommended to have port placed ,   originally presented to the hospital on 2/27/2019 due to complaints of abdominal pain  He was found to have acute on chronic pancreatitis due to hypertriglyceridemia and was given plasmapheresis  His triglyceride improved and his abdominal pain improved  Presently doing well   Has spoken to md brenner, about med changes , ? Review of Systems   Constitutional: Negative for appetite change, chills, fever and unexpected weight change  HENT: Negative for congestion, dental problem, ear pain, hearing loss, postnasal drip, rhinorrhea, sinus pressure, sinus pain, sneezing, sore throat, tinnitus and voice change  Eyes: Negative for visual disturbance  Respiratory: Negative for apnea, cough, chest tightness and shortness of breath  Cardiovascular: Negative for chest pain, palpitations and leg swelling  Gastrointestinal: Negative for abdominal pain, blood in stool, constipation, diarrhea, nausea and vomiting  Endocrine: Negative for cold intolerance, heat intolerance, polydipsia, polyphagia and polyuria     Genitourinary: Negative for decreased urine volume, difficulty urinating, dysuria, frequency and hematuria  Musculoskeletal: Negative for arthralgias, back pain, gait problem, joint swelling and myalgias  Skin: Negative for color change, rash and wound  Allergic/Immunologic: Negative for environmental allergies and food allergies  Neurological: Negative for dizziness, syncope, weakness, light-headedness, numbness and headaches  Hematological: Negative for adenopathy  Does not bruise/bleed easily  Psychiatric/Behavioral: Negative for sleep disturbance and suicidal ideas  The patient is not nervous/anxious  Objective:     Physical Exam   Constitutional: He is oriented to person, place, and time  He appears well-developed and well-nourished  HENT:   Head: Normocephalic and atraumatic  Right Ear: External ear normal    Left Ear: External ear normal    Mouth/Throat: Oropharynx is clear and moist    Eyes: Conjunctivae are normal  No scleral icterus  Neck: Normal range of motion  Neck supple  Cardiovascular: Normal rate, regular rhythm and normal heart sounds  Pulmonary/Chest: Effort normal and breath sounds normal    Abdominal: Soft  Bowel sounds are normal    Musculoskeletal: Normal range of motion  Lymphadenopathy:     He has no cervical adenopathy  Neurological: He is alert and oriented to person, place, and time  He has normal reflexes  Skin: Skin is warm and dry  Psychiatric: He has a normal mood and affect  His behavior is normal  Judgment and thought content normal          Vitals:    03/14/19 0953   BP: 132/66   Pulse: 75   Temp: (!) 96 7 °F (35 9 °C)   SpO2: 97%   Weight: 98 kg (216 lb)   Height: 5' 11" (1 803 m)       Transitional Care Management Review:  Dale Chacon is a 53 y o  male here for TCM follow up       During the TCM phone call patient stated:    TCM Call (since 2/11/2019)     Date and time call was made  3/4/2019  1:52 PM    Hospital care reviewed  Records reviewed    Patient was hospitialized at  [facility]    Date of Admission  02/27/19    Date of discharge  03/03/19    Diagnosis  Acute on chronic pancreatitis [facility])     Disposition  Home    Were the patients medications reviewed and updated  Yes    Current Symptoms  Middle abdominal pain    Middle abdominal pain severity  Mild    Middle abdominal pain onset  Gradual pain is bareable      TCM Call (since 2/11/2019)     Post hospital issues  None    Should patient be enrolled in anticoag monitoring? No    Scheduled for follow up? Patient Refused    Patient refusal reason  Patient refused to schedule stating that he has the specialist appointment's to go to first and then maybe he will schedule  Patients specialists  Other (comment)    Other specialists names  Dr Bonny Rubio    Other specialists contcat #  Gastro    Referrals needed  no    Did you obtain your prescribed medications  Yes    Do you need help managing your prescriptions or medications  No    Is transportation to your appointment needed  No    I have advised the patient to call PCP with any new or worsening symptoms  Sandra Wang  A  Living Arrangements  Spouse or Significiant other; Children    Are you recieving any outpatient services  No    Are you recieving home care services  No    Are you using any community resources  No    Current waiver services  No    Have you fallen in the last 12 months  No    Interperter language line needed  No    Counseling  Patient    Counseling topics  Importance of RX compliance          LUANA Shelby      BMI Counseling: Body mass index is 30 13 kg/m²  Discussed the patient's BMI with him  The BMI is above average  BMI counseling and education was provided to the patient   Nutrition recommendations include reducing portion sizes, decreasing overall calorie intake, 3-5 servings of fruits/vegetables daily, reducing fast food intake, consuming healthier snacks, decreasing soda and/or juice intake, moderation in carbohydrate intake, increasing intake of lean protein, reducing intake of saturated fat and trans fat and reducing intake of cholesterol

## 2019-03-14 NOTE — PATIENT INSTRUCTIONS
Weight Management   AMBULATORY CARE:   Why it is important to manage your weight:  Being overweight increases your risk of health conditions such as heart disease, high blood pressure, type 2 diabetes, and certain types of cancer  It can also increase your risk for osteoarthritis, sleep apnea, and other respiratory problems  Aim for a slow, steady weight loss  Even a small amount of weight loss can lower your risk of health problems  How to lose weight safely:  A safe and healthy way to lose weight is to eat fewer calories and get regular exercise  You can lose up about 1 pound a week by decreasing the number of calories you eat by 500 calories each day  You can decrease calories by eating smaller portion sizes or by cutting out high-calorie foods  Read labels to find out how many calories are in the foods you eat  You can also burn calories with exercise such as walking, swimming, or biking  You will be more likely to keep weight off if you make these changes part of your lifestyle  Healthy meal plan for weight management:  A healthy meal plan includes a variety of foods, contains fewer calories, and helps you stay healthy  A healthy meal plan includes the following:  · Eat whole-grain foods more often  A healthy meal plan should contain fiber  Fiber is the part of grains, fruits, and vegetables that is not broken down by your body  Whole-grain foods are healthy and provide extra fiber in your diet  Some examples of whole-grain foods are whole-wheat breads and pastas, oatmeal, brown rice, and bulgur  · Eat a variety of vegetables every day  Include dark, leafy greens such as spinach, kale, jemima greens, and mustard greens  Eat yellow and orange vegetables such as carrots, sweet potatoes, and winter squash  · Eat a variety of fruits every day  Choose fresh or canned fruit (canned in its own juice or light syrup) instead of juice  Fruit juice has very little or no fiber  · Eat low-fat dairy foods  Drink fat-free (skim) milk or 1% milk  Eat fat-free yogurt and low-fat cottage cheese  Try low-fat cheeses such as mozzarella and other reduced-fat cheeses  · Choose meat and other protein foods that are low in fat  Choose beans or other legumes such as split peas or lentils  Choose fish, skinless poultry (chicken or turkey), or lean cuts of red meat (beef or pork)  Before you cook meat or poultry, cut off any visible fat  · Use less fat and oil  Try baking foods instead of frying them  Add less fat, such as margarine, sour cream, regular salad dressing and mayonnaise to foods  Eat fewer high-fat foods  Some examples of high-fat foods include french fries, doughnuts, ice cream, and cakes  · Eat fewer sweets  Limit foods and drinks that are high in sugar  This includes candy, cookies, regular soda, and sweetened drinks  Ways to decrease calories:   · Eat smaller portions  ¨ Use a small plate with smaller servings  ¨ Do not eat second helpings  ¨ When you eat at a restaurant, ask for a box and place half of your meal in the box before you eat  ¨ Share an entrée with someone else  · Replace high-calorie snacks with healthy, low-calorie snacks  ¨ Choose fresh fruit, vegetables, fat-free rice cakes, or air-popped popcorn instead of potato chips, nuts, or chocolate  ¨ Choose water or calorie-free drinks instead of soda or sweetened drinks  · Eat regular meals  Skipping meals can lead to overeating later in the day  Eat a healthy snack in place of a meal if you do not have time to eat a regular meal      · Do not shop for groceries when you are hungry  You may be more likely to make unhealthy food choices  Take a grocery list of healthy foods and shop after you have eaten  Exercise:  Exercise at least 30 minutes per day on most days of the week  Some examples of exercise include walking, biking, dancing, and swimming   You can also fit in more physical activity by taking the stairs instead of the elevator or parking farther away from stores  Ask your healthcare provider about the best exercise plan for you  Other things to consider as you try to lose weight:   · Be aware of situations that may give you the urge to overeat, such as eating while watching television  Find ways to avoid these situations  For example, read a book, go for a walk, or do crafts  · Meet with a weight loss support group or friends who are also trying to lose weight  This may help you stay motivated to continue working on your weight loss goals  © 2017 [address] [facility] Information is for End User's use only and may not be sold, redistributed or otherwise used for commercial purposes  All illustrations and images included in CareNotes® are the copyrighted property of [employer]  or [employer]  The above information is an  only  It is not intended as medical advice for individual conditions or treatments  Talk to your doctor, nurse or pharmacist before following any medical regimen to see if it is safe and effective for you  Low Fat Diet   AMBULATORY CARE:   A low-fat diet  is an eating plan that is low in total fat, unhealthy fat, and cholesterol  You may need to follow a low-fat diet if you have trouble digesting or absorbing fat  You may also need to follow this diet if you have high cholesterol  You can also lower your cholesterol by increasing the amount of fiber in your diet  Soluble fiber is a type of fiber that helps to decrease cholesterol levels  Different types of fat in food:   · Limit unhealthy fats  A diet that is high in cholesterol, saturated fat, and trans fat may cause unhealthy cholesterol levels  Unhealthy cholesterol levels increase your risk of heart disease  ¨ Cholesterol:  Limit intake of cholesterol to less than 200 mg per day  Cholesterol is found in meat, eggs, and dairy      ¨ Saturated fat:  Limit saturated fat to less than 7% of your total daily calories  Ask your dietitian how many calories you need each day  Saturated fat is found in butter, cheese, ice cream, whole milk, and palm oil  Saturated fat is also found in meat, such as beef, pork, chicken skin, and processed meats  Processed meats include sausage, hot dogs, and bologna  ¨ Trans fat:  Avoid trans fat as much as possible  Trans fat is used in fried and baked foods  Foods that say trans fat free on the label may still have up to 0 5 grams of trans fat per serving  · Include healthy fats  Replace foods that are high in saturated and trans fat with foods high in healthy fats  This may help to decrease high cholesterol levels  ¨ Monounsaturated fats: These are found in avocados, nuts, and vegetable oils, such as olive, canola, and sunflower oil  ¨ Polyunsaturated fats: These can be found in vegetable oils, such as soybean or corn oil  Omega-3 fats can help to decrease the risk of heart disease  Omega-3 fats are found in fish, such as salmon, herring, trout, and tuna  Omega-3 fats can also be found in plant foods, such as walnuts, flaxseed, soybeans, and canola oil    Foods to limit or avoid:   · Grains:      ¨ Snacks that are made with partially hydrogenated oils, such as chips, regular crackers, and butter-flavored popcorn    ¨ High-fat baked goods, such as biscuits, croissants, doughnuts, pies, cookies, and pastries    · Dairy:      ¨ Whole milk, 2% milk, and yogurt and ice cream made with whole milk    ¨ Half and half creamer, heavy cream, and whipping cream    ¨ Cheese, cream cheese, and sour cream    · Meats and proteins:      ¨ High-fat cuts of meat (T-bone steak, regular hamburger, and ribs)    ¨ Fried meat, poultry (turkey and chicken), and fish    ¨ Poultry (chicken and turkey) with skin    ¨ Cold cuts (salami or bologna), hot dogs, llanos, and sausage    ¨ Whole eggs and egg yolks    · Vegetables and fruits with added fat:      ¨ Fried vegetables or vegetables in butter or high-fat sauces, such as cream or cheese sauces    ¨ Fried fruit or fruit served with butter or cream    · Fats:      ¨ Butter, stick margarine, and shortening    ¨ Coconut, palm oil, and palm kernel oil  Foods to include:   · Grains:      ¨ Whole-grain breads, cereals, pasta, and brown rice    ¨ Low-fat crackers and pretzels    · Vegetables and fruits:      ¨ Fresh, frozen, or canned vegetables (no salt or low-sodium)    ¨ Fresh, frozen, dried, or canned fruit (canned in light syrup or fruit juice)    ¨ Avocado    · Low-fat dairy products:      ¨ Nonfat (skim) or 1% milk    ¨ Nonfat or low-fat cheese, yogurt, and cottage cheese    · Meats and proteins:      ¨ Chicken or turkey with no skin    ¨ Baked or broiled fish    ¨ Lean beef and pork (loin, round, extra lean hamburger)    ¨ Beans and peas, unsalted nuts, soy products    ¨ Egg whites and substitutes    ¨ Seeds and nuts    · Fats:      ¨ Unsaturated oil, such as canola, olive, peanut, soybean, or sunflower oil    ¨ Soft or liquid margarine and vegetable oil spread    ¨ Low-fat salad dressing  Other ways to decrease fat:   · Read food labels before you buy foods  Choose foods that have less than 30% of calories from fat  Choose low-fat or fat-free dairy products  Remember that fat free does not mean calorie free  These foods still contain calories, and too many calories can lead to weight gain  · Trim fat from meat and avoid fried food  Trim all visible fat from meat before you cook it  Remove the skin from poultry  Do not springer meat, fish, or poultry  Bake, roast, boil, or broil these foods instead  Avoid fried foods  Eat a baked potato instead of [place] fries  Steam vegetables instead of sautéing them in butter  · Add less fat to foods  Use imitation llanos bits on salads and baked potatoes instead of regular llanos bits  Use fat-free or low-fat salad dressings instead of regular dressings   Use low-fat or nonfat butter-flavored topping instead of regular butter or margarine on popcorn and other foods  Ways to decrease fat in recipes:  Replace high-fat ingredients with low-fat or nonfat ones  This may cause baked goods to be drier than usual  You may need to use nonfat cooking spray on pans to prevent food from sticking  You also may need to change the amount of other ingredients, such as water, in the recipe  Try the following:  · Use low-fat or light margarine instead of regular margarine or shortening  · Use lean ground turkey breast or chicken, or lean ground beef (less than 5% fat) instead of hamburger  · Add 1 teaspoon of canola oil to 8 ounces of skim milk instead of using cream or half and half  · Use grated zucchini, carrots, or apples in breads instead of coconut  · Use blenderized, low-fat cottage cheese, plain tofu, or low-fat ricotta cheese instead of cream cheese  · Use 1 egg white and 1 teaspoon of canola oil, or use ¼ cup (2 ounces) of fat-free egg substitute instead of a whole egg  · Replace half of the oil that is called for in a recipe with applesauce when you bake  Use 3 tablespoons of cocoa powder and 1 tablespoon of canola oil instead of a square of baking chocolate  How to increase fiber:  Eat enough high-fiber foods to get 20 to 30 grams of fiber every day  Slowly increase your fiber intake to avoid stomach cramps, gas, and other problems  · Eat 3 ounces of whole-grain foods each day  An ounce is about 1 slice of bread  Eat whole-grain breads, such as whole-wheat bread  Whole wheat, whole-wheat flour, or other whole grains should be listed as the first ingredient on the food label  Replace white flour with whole-grain flour or use half of each in recipes  Whole-grain flour is heavier than white flour, so you may have to add more yeast or baking powder  · Eat a high-fiber cereal for breakfast   Oatmeal is a good source of soluble fiber  Look for cereals that have bran or fiber in the name   Choose whole-grain products, such as brown rice, barley, and whole-wheat pasta  · Eat more beans, peas, and lentils  For example, add beans to soups or salads  Eat at least 5 cups of fruits and vegetables each day  Eat fruits and vegetables with the peel because the peel is high in fiber  © 2017 [address] [employer]  Information is for End User's use only and may not be sold, redistributed or otherwise used for commercial purposes  All illustrations and images included in CareNotes® are the copyrighted property of [employer] , [employer]  or [employer]  The above information is an  only  It is not intended as medical advice for individual conditions or treatments  Talk to your doctor, nurse or pharmacist before following any medical regimen to see if it is safe and effective for you  Heart Healthy Diet   AMBULATORY CARE:   A heart healthy diet  is an eating plan low in total fat, unhealthy fats, and sodium (salt)  A heart healthy diet helps decrease your risk for heart disease and stroke  Limit the amount of fat you eat to 25% to 35% of your total daily calories  Limit sodium to less than 2,300 mg each day  Healthy fats:  Healthy fats can help improve cholesterol levels  The risk for heart disease is decreased when cholesterol levels are normal  Choose healthy fats, such as the following:  · Unsaturated fat  is found in foods such as soybean, canola, olive, corn, and safflower oils  It is also found in soft tub margarine that is made with liquid vegetable oil  · Omega-3 fat  is found in certain fish, such as salmon, tuna, and trout, and in walnuts and flaxseed  Unhealthy fats:  Unhealthy fats can cause unhealthy cholesterol levels in your blood and increase your risk of heart disease  Limit unhealthy fats, such as the following:  · Cholesterol  is found in animal foods, such as eggs and lobster, and in dairy products made from whole milk   Limit cholesterol to less than 300 milligrams (mg) each day  You may need to limit cholesterol to 200 mg each day if you have heart disease  · Saturated fat  is found in meats, such as llanos and hamburger  It is also found in chicken or turkey skin, whole milk, and butter  Limit saturated fat to less than 7% of your total daily calories  Limit saturated fat to less than 6% if you have heart disease or are at increased risk for it  · Trans fat  is found in packaged foods, such as potato chips and cookies  It is also in hard margarine, some fried foods, and shortening  Avoid trans fats as much as possible    Heart healthy foods and drinks to include:  Ask your dietitian or healthcare provider how many servings to have from each of the following food groups:  · Grains:      ¨ Whole-wheat breads, cereals, and pastas, and brown rice    ¨ Low-fat, low-sodium crackers and chips    · Vegetables:      ¨ Broccoli, green beans, green peas, and spinach    ¨ Collards, kale, and lima beans    ¨ Carrots, sweet potatoes, tomatoes, and peppers    ¨ Canned vegetables with no salt added    · Fruits:      ¨ Bananas, peaches, pears, and pineapple    ¨ Grapes, raisins, and dates    ¨ Oranges, tangerines, grapefruit, orange juice, and grapefruit juice    ¨ Apricots, mangoes, melons, and papaya    ¨ Raspberries and strawberries    ¨ Canned fruit with no added sugar    · Low-fat dairy products:      ¨ Nonfat (skim) milk, 1% milk, and low-fat almond, cashew, or soy milks fortified with calcium    ¨ Low-fat cheese, regular or frozen yogurt, and cottage cheese    · Meats and proteins , such as lean cuts of beef and pork (loin, leg, round), skinless chicken and turkey, legumes, soy products, egg whites, and nuts  Foods and drinks to limit or avoid:  Ask your dietitian or healthcare provider about these and other foods that are high in unhealthy fat, sodium, and sugar:  · Snack or packaged foods , such as frozen dinners, cookies, macaroni and cheese, and cereals with more than 300 mg of sodium per serving    · Canned or dry mixes  for cakes, soups, sauces, or gravies    · Vegetables with added sodium , such as instant potatoes, vegetables with added sauces, or regular canned vegetables    · Other foods high in sodium , such as ketchup, barbecue sauce, salad dressing, pickles, olives, soy sauce, and miso    · High-fat dairy foods  such as whole or 2% milk, cream cheese, or sour cream, and cheeses     · High-fat protein foods  such as high-fat cuts of beef (T-bone steaks, ribs), chicken or turkey with skin, and organ meats, such as liver    · Cured or smoked meats , such as hot dogs, llanos, and sausage    · Unhealthy fats and oils , such as butter, stick margarine, shortening, and cooking oils such as coconut or palm oil    · Food and drinks high in sugar , such as soft drinks (soda), sports drinks, sweetened tea, candy, cake, cookies, pies, and doughnuts  Other diet guidelines to follow:   · Eat more foods containing omega-3 fats  Eat fish high in omega-3 fats at least 2 times a week  · Limit alcohol  Too much alcohol can damage your heart and raise your blood pressure  Women should limit alcohol to 1 drink a day  Men should limit alcohol to 2 drinks a day  A drink of alcohol is 12 ounces of beer, 5 ounces of wine, or 1½ ounces of liquor  · Choose low-sodium foods  High-sodium foods can lead to high blood pressure  Add little or no salt to food you prepare  Use herbs and spices in place of salt  · Eat more fiber  to help lower cholesterol levels  Eat at least 5 servings of fruits and vegetables each day  Eat 3 ounces of whole-grain foods each day  Legumes (beans) are also a good source of fiber  Lifestyle guidelines:   · Do not smoke  Nicotine and other chemicals in cigarettes and cigars can cause lung and heart damage  Ask your healthcare provider for information if you currently smoke and need help to quit  E-cigarettes or smokeless tobacco still contain nicotine  Talk to your healthcare provider before you use these products  · Exercise regularly  to help you maintain a healthy weight and improve your blood pressure and cholesterol levels  Ask your healthcare provider about the best exercise plan for you  Do not start an exercise program without asking your healthcare provider  Follow up with your healthcare provider as directed:  Write down your questions so you remember to ask them during your visits  © 2017 [address] [employer]  Information is for End User's use only and may not be sold, redistributed or otherwise used for commercial purposes  All illustrations and images included in CareNotes® are the copyrighted property of [employer]  or [employer]  The above information is an  only  It is not intended as medical advice for individual conditions or treatments  Talk to your doctor, nurse or pharmacist before following any medical regimen to see if it is safe and effective for you

## 2019-03-21 DIAGNOSIS — G89.4 CHRONIC PAIN SYNDROME: ICD-10-CM

## 2019-03-27 ENCOUNTER — TELEPHONE (OUTPATIENT)
Dept: PAIN MEDICINE | Facility: CLINIC | Age: 44
End: 2019-03-27

## 2019-03-27 DIAGNOSIS — G89.4 CHRONIC PAIN SYNDROME: ICD-10-CM

## 2019-03-27 DIAGNOSIS — R10.10 PAIN OF UPPER ABDOMEN: ICD-10-CM

## 2019-03-27 DIAGNOSIS — K86.1 ACUTE ON CHRONIC PANCREATITIS (HCC): Primary | ICD-10-CM

## 2019-03-27 DIAGNOSIS — K85.90 ACUTE ON CHRONIC PANCREATITIS (HCC): Primary | ICD-10-CM

## 2019-04-04 ENCOUNTER — OFFICE VISIT (OUTPATIENT)
Dept: GASTROENTEROLOGY | Facility: CLINIC | Age: 44
End: 2019-04-04
Payer: COMMERCIAL

## 2019-04-04 VITALS
SYSTOLIC BLOOD PRESSURE: 136 MMHG | DIASTOLIC BLOOD PRESSURE: 96 MMHG | WEIGHT: 214.38 LBS | BODY MASS INDEX: 29.9 KG/M2 | HEART RATE: 81 BPM

## 2019-04-04 DIAGNOSIS — K86.1 CHRONIC PANCREATITIS, UNSPECIFIED PANCREATITIS TYPE (HCC): ICD-10-CM

## 2019-04-04 DIAGNOSIS — E78.1 HYPERTRIGLYCERIDEMIA: Primary | ICD-10-CM

## 2019-04-04 DIAGNOSIS — R14.0 BLOATING: ICD-10-CM

## 2019-04-04 PROCEDURE — 99214 OFFICE O/P EST MOD 30 MIN: CPT | Performed by: PHYSICIAN ASSISTANT

## 2019-04-11 ENCOUNTER — APPOINTMENT (EMERGENCY)
Dept: CT IMAGING | Facility: HOSPITAL | Age: 44
DRG: 440 | End: 2019-04-11
Payer: COMMERCIAL

## 2019-04-11 ENCOUNTER — HOSPITAL ENCOUNTER (INPATIENT)
Facility: HOSPITAL | Age: 44
LOS: 6 days | Discharge: HOME | DRG: 440 | End: 2019-04-17
Attending: EMERGENCY MEDICINE | Admitting: HOSPITALIST
Payer: COMMERCIAL

## 2019-04-11 ENCOUNTER — APPOINTMENT (INPATIENT)
Dept: RADIOLOGY | Facility: HOSPITAL | Age: 44
DRG: 440 | End: 2019-04-11
Payer: COMMERCIAL

## 2019-04-11 DIAGNOSIS — K86.1 ACUTE ON CHRONIC PANCREATITIS (HCC): Primary | ICD-10-CM

## 2019-04-11 DIAGNOSIS — K85.90 ACUTE ON CHRONIC PANCREATITIS (HCC): Primary | ICD-10-CM

## 2019-04-11 DIAGNOSIS — E83.51 HYPOCALCEMIA: ICD-10-CM

## 2019-04-11 DIAGNOSIS — E78.1 HYPERTRIGLYCERIDEMIA: ICD-10-CM

## 2019-04-11 PROBLEM — R10.10 UPPER ABDOMINAL PAIN: Status: ACTIVE | Noted: 2019-04-11

## 2019-04-11 LAB
ALBUMIN SERPL BCP-MCNC: 3.1 G/DL (ref 3.5–5)
ALP SERPL-CCNC: 101 U/L (ref 46–116)
ALT SERPL W P-5'-P-CCNC: 61 U/L (ref 12–78)
ANION GAP SERPL CALCULATED.3IONS-SCNC: 14 MMOL/L (ref 4–13)
AST SERPL W P-5'-P-CCNC: 21 U/L (ref 5–45)
BASOPHILS # BLD AUTO: 0.03 THOUSANDS/ΜL (ref 0–0.1)
BASOPHILS NFR BLD AUTO: 0 % (ref 0–1)
BILIRUB SERPL-MCNC: 0.5 MG/DL (ref 0.2–1)
BILIRUB UR QL STRIP: NEGATIVE
BUN SERPL-MCNC: 9 MG/DL (ref 5–25)
CA-I BLD-SCNC: 0.88 MMOL/L (ref 1.12–1.32)
CALCIUM SERPL-MCNC: 7.1 MG/DL (ref 8.3–10.1)
CHLORIDE SERPL-SCNC: 102 MMOL/L (ref 100–108)
CLARITY UR: CLEAR
CO2 SERPL-SCNC: 21 MMOL/L (ref 21–32)
COLOR UR: YELLOW
CREAT SERPL-MCNC: 0.72 MG/DL (ref 0.6–1.3)
EOSINOPHIL # BLD AUTO: 0.04 THOUSAND/ΜL (ref 0–0.61)
EOSINOPHIL NFR BLD AUTO: 1 % (ref 0–6)
ERYTHROCYTE [DISTWIDTH] IN BLOOD BY AUTOMATED COUNT: 12.6 % (ref 11.6–15.1)
GFR SERPL CREATININE-BSD FRML MDRD: 112 ML/MIN/1.73SQ M
GLUCOSE SERPL-MCNC: 113 MG/DL (ref 65–140)
GLUCOSE SERPL-MCNC: 117 MG/DL (ref 65–140)
GLUCOSE SERPL-MCNC: 118 MG/DL (ref 65–140)
GLUCOSE SERPL-MCNC: 119 MG/DL (ref 65–140)
GLUCOSE SERPL-MCNC: 120 MG/DL (ref 65–140)
GLUCOSE SERPL-MCNC: 162 MG/DL (ref 65–140)
GLUCOSE SERPL-MCNC: 99 MG/DL (ref 65–140)
GLUCOSE UR STRIP-MCNC: ABNORMAL MG/DL
HCT VFR BLD AUTO: 37.1 % (ref 36.5–49.3)
HGB BLD-MCNC: 12.8 G/DL (ref 12–17)
HGB UR QL STRIP.AUTO: NEGATIVE
IMM GRANULOCYTES # BLD AUTO: 0.03 THOUSAND/UL (ref 0–0.2)
IMM GRANULOCYTES NFR BLD AUTO: 0 % (ref 0–2)
KETONES UR STRIP-MCNC: NEGATIVE MG/DL
LACTATE SERPL-SCNC: <0.3 MMOL/L (ref 0.5–2)
LEUKOCYTE ESTERASE UR QL STRIP: NEGATIVE
LIPASE SERPL-CCNC: 661 U/L (ref 73–393)
LYMPHOCYTES # BLD AUTO: 1.24 THOUSANDS/ΜL (ref 0.6–4.47)
LYMPHOCYTES NFR BLD AUTO: 14 % (ref 14–44)
MCH RBC QN AUTO: 32.5 PG (ref 26.8–34.3)
MCHC RBC AUTO-ENTMCNC: 34.5 G/DL (ref 31.4–37.4)
MCV RBC AUTO: 94 FL (ref 82–98)
MONOCYTES # BLD AUTO: 0.51 THOUSAND/ΜL (ref 0.17–1.22)
MONOCYTES NFR BLD AUTO: 6 % (ref 4–12)
NEUTROPHILS # BLD AUTO: 6.75 THOUSANDS/ΜL (ref 1.85–7.62)
NEUTS SEG NFR BLD AUTO: 79 % (ref 43–75)
NITRITE UR QL STRIP: NEGATIVE
NRBC BLD AUTO-RTO: 0 /100 WBCS
PH UR STRIP.AUTO: 7.5 [PH]
PLATELET # BLD AUTO: 115 THOUSANDS/UL (ref 149–390)
PMV BLD AUTO: 10.9 FL (ref 8.9–12.7)
POTASSIUM SERPL-SCNC: 3.7 MMOL/L (ref 3.5–5.3)
PROT SERPL-MCNC: 5.8 G/DL (ref 6.4–8.2)
PROT UR STRIP-MCNC: NEGATIVE MG/DL
RBC # BLD AUTO: 3.94 MILLION/UL (ref 3.88–5.62)
SODIUM SERPL-SCNC: 137 MMOL/L (ref 136–145)
SP GR UR STRIP.AUTO: 1.01 (ref 1–1.03)
TRIGL SERPL-MCNC: 1746 MG/DL
TRIGL SERPL-MCNC: 1758 MG/DL
UROBILINOGEN UR QL STRIP.AUTO: 0.2 E.U./DL
WBC # BLD AUTO: 8.6 THOUSAND/UL (ref 4.31–10.16)

## 2019-04-11 PROCEDURE — 36556 INSERT NON-TUNNEL CV CATH: CPT | Performed by: NURSE PRACTITIONER

## 2019-04-11 PROCEDURE — 36415 COLL VENOUS BLD VENIPUNCTURE: CPT | Performed by: EMERGENCY MEDICINE

## 2019-04-11 PROCEDURE — 84478 ASSAY OF TRIGLYCERIDES: CPT | Performed by: EMERGENCY MEDICINE

## 2019-04-11 PROCEDURE — 81003 URINALYSIS AUTO W/O SCOPE: CPT | Performed by: EMERGENCY MEDICINE

## 2019-04-11 PROCEDURE — NC001 PR NO CHARGE: Performed by: INTERNAL MEDICINE

## 2019-04-11 PROCEDURE — 96376 TX/PRO/DX INJ SAME DRUG ADON: CPT

## 2019-04-11 PROCEDURE — 74177 CT ABD & PELVIS W/CONTRAST: CPT

## 2019-04-11 PROCEDURE — 83605 ASSAY OF LACTIC ACID: CPT | Performed by: EMERGENCY MEDICINE

## 2019-04-11 PROCEDURE — 02HV33Z INSERTION OF INFUSION DEVICE INTO SUPERIOR VENA CAVA, PERCUTANEOUS APPROACH: ICD-10-PCS | Performed by: GENERAL PRACTICE

## 2019-04-11 PROCEDURE — 96375 TX/PRO/DX INJ NEW DRUG ADDON: CPT

## 2019-04-11 PROCEDURE — 83690 ASSAY OF LIPASE: CPT | Performed by: EMERGENCY MEDICINE

## 2019-04-11 PROCEDURE — 84478 ASSAY OF TRIGLYCERIDES: CPT | Performed by: PHYSICIAN ASSISTANT

## 2019-04-11 PROCEDURE — 99285 EMERGENCY DEPT VISIT HI MDM: CPT | Performed by: EMERGENCY MEDICINE

## 2019-04-11 PROCEDURE — 96366 THER/PROPH/DIAG IV INF ADDON: CPT

## 2019-04-11 PROCEDURE — 85025 COMPLETE CBC W/AUTO DIFF WBC: CPT | Performed by: EMERGENCY MEDICINE

## 2019-04-11 PROCEDURE — 71045 X-RAY EXAM CHEST 1 VIEW: CPT

## 2019-04-11 PROCEDURE — NC001 PR NO CHARGE: Performed by: NURSE PRACTITIONER

## 2019-04-11 PROCEDURE — 99285 EMERGENCY DEPT VISIT HI MDM: CPT

## 2019-04-11 PROCEDURE — 82948 REAGENT STRIP/BLOOD GLUCOSE: CPT

## 2019-04-11 PROCEDURE — 80053 COMPREHEN METABOLIC PANEL: CPT | Performed by: EMERGENCY MEDICINE

## 2019-04-11 PROCEDURE — 82330 ASSAY OF CALCIUM: CPT | Performed by: EMERGENCY MEDICINE

## 2019-04-11 PROCEDURE — 96365 THER/PROPH/DIAG IV INF INIT: CPT

## 2019-04-11 PROCEDURE — 99223 1ST HOSP IP/OBS HIGH 75: CPT | Performed by: HOSPITALIST

## 2019-04-11 PROCEDURE — 99254 IP/OBS CNSLTJ NEW/EST MOD 60: CPT | Performed by: INTERNAL MEDICINE

## 2019-04-11 RX ORDER — FENOFIBRATE 145 MG/1
145 TABLET, COATED ORAL DAILY
Status: DISCONTINUED | OUTPATIENT
Start: 2019-04-11 | End: 2019-04-17 | Stop reason: HOSPADM

## 2019-04-11 RX ORDER — NIACIN 500 MG/1
1000 TABLET, EXTENDED RELEASE ORAL
Status: DISCONTINUED | OUTPATIENT
Start: 2019-04-11 | End: 2019-04-12

## 2019-04-11 RX ORDER — SODIUM CHLORIDE, SODIUM LACTATE, POTASSIUM CHLORIDE, CALCIUM CHLORIDE 600; 310; 30; 20 MG/100ML; MG/100ML; MG/100ML; MG/100ML
125 INJECTION, SOLUTION INTRAVENOUS CONTINUOUS
Status: DISCONTINUED | OUTPATIENT
Start: 2019-04-11 | End: 2019-04-11

## 2019-04-11 RX ORDER — HYDROMORPHONE HCL/PF 1 MG/ML
1 SYRINGE (ML) INJECTION ONCE
Status: COMPLETED | OUTPATIENT
Start: 2019-04-11 | End: 2019-04-11

## 2019-04-11 RX ORDER — ONDANSETRON 2 MG/ML
4 INJECTION INTRAMUSCULAR; INTRAVENOUS EVERY 6 HOURS PRN
Status: DISCONTINUED | OUTPATIENT
Start: 2019-04-11 | End: 2019-04-17 | Stop reason: HOSPADM

## 2019-04-11 RX ORDER — DEXTROSE AND SODIUM CHLORIDE 5; .9 G/100ML; G/100ML
125 INJECTION, SOLUTION INTRAVENOUS CONTINUOUS
Status: DISCONTINUED | OUTPATIENT
Start: 2019-04-11 | End: 2019-04-11

## 2019-04-11 RX ORDER — HYDROMORPHONE HCL/PF 1 MG/ML
1 SYRINGE (ML) INJECTION ONCE AS NEEDED
Status: COMPLETED | OUTPATIENT
Start: 2019-04-11 | End: 2019-04-11

## 2019-04-11 RX ORDER — HYDROMORPHONE HCL/PF 1 MG/ML
1 SYRINGE (ML) INJECTION EVERY 2 HOUR PRN
Status: DISCONTINUED | OUTPATIENT
Start: 2019-04-11 | End: 2019-04-16

## 2019-04-11 RX ORDER — SODIUM CHLORIDE, SODIUM GLUCONATE, SODIUM ACETATE, POTASSIUM CHLORIDE, MAGNESIUM CHLORIDE, SODIUM PHOSPHATE, DIBASIC, AND POTASSIUM PHOSPHATE .53; .5; .37; .037; .03; .012; .00082 G/100ML; G/100ML; G/100ML; G/100ML; G/100ML; G/100ML; G/100ML
1000 INJECTION, SOLUTION INTRAVENOUS ONCE
Status: COMPLETED | OUTPATIENT
Start: 2019-04-11 | End: 2019-04-11

## 2019-04-11 RX ORDER — ONDANSETRON 4 MG/1
8 TABLET, ORALLY DISINTEGRATING ORAL EVERY 8 HOURS SCHEDULED
Status: DISCONTINUED | OUTPATIENT
Start: 2019-04-11 | End: 2019-04-17 | Stop reason: HOSPADM

## 2019-04-11 RX ORDER — HYDROMORPHONE HCL/PF 1 MG/ML
1 SYRINGE (ML) INJECTION ONCE
Status: DISCONTINUED | OUTPATIENT
Start: 2019-04-11 | End: 2019-04-11

## 2019-04-11 RX ORDER — ONDANSETRON 2 MG/ML
4 INJECTION INTRAMUSCULAR; INTRAVENOUS ONCE
Status: DISCONTINUED | OUTPATIENT
Start: 2019-04-11 | End: 2019-04-17 | Stop reason: HOSPADM

## 2019-04-11 RX ORDER — TRAZODONE HYDROCHLORIDE 50 MG/1
50 TABLET ORAL
Status: DISCONTINUED | OUTPATIENT
Start: 2019-04-11 | End: 2019-04-17 | Stop reason: HOSPADM

## 2019-04-11 RX ORDER — MORPHINE SULFATE 10 MG/ML
6 INJECTION, SOLUTION INTRAMUSCULAR; INTRAVENOUS ONCE
Status: COMPLETED | OUTPATIENT
Start: 2019-04-11 | End: 2019-04-11

## 2019-04-11 RX ORDER — CHLORAL HYDRATE 500 MG
1000 CAPSULE ORAL 2 TIMES DAILY
Status: DISCONTINUED | OUTPATIENT
Start: 2019-04-11 | End: 2019-04-12

## 2019-04-11 RX ORDER — PREGABALIN 100 MG/1
100 CAPSULE ORAL 3 TIMES DAILY
Status: DISCONTINUED | OUTPATIENT
Start: 2019-04-11 | End: 2019-04-17 | Stop reason: HOSPADM

## 2019-04-11 RX ORDER — NICOTINE 21 MG/24HR
1 PATCH, TRANSDERMAL 24 HOURS TRANSDERMAL DAILY
Status: DISCONTINUED | OUTPATIENT
Start: 2019-04-11 | End: 2019-04-17 | Stop reason: HOSPADM

## 2019-04-11 RX ORDER — ATORVASTATIN CALCIUM 40 MG/1
80 TABLET, FILM COATED ORAL
Status: DISCONTINUED | OUTPATIENT
Start: 2019-04-11 | End: 2019-04-17 | Stop reason: HOSPADM

## 2019-04-11 RX ORDER — FAMOTIDINE 20 MG/1
20 TABLET, FILM COATED ORAL 2 TIMES DAILY
Status: DISCONTINUED | OUTPATIENT
Start: 2019-04-11 | End: 2019-04-17 | Stop reason: HOSPADM

## 2019-04-11 RX ORDER — ALBUMIN, HUMAN INJ 5% 5 %
225 SOLUTION INTRAVENOUS ONCE
Status: DISCONTINUED | OUTPATIENT
Start: 2019-04-11 | End: 2019-04-17 | Stop reason: HOSPADM

## 2019-04-11 RX ORDER — HYDROMORPHONE HCL/PF 1 MG/ML
1 SYRINGE (ML) INJECTION
Status: DISCONTINUED | OUTPATIENT
Start: 2019-04-11 | End: 2019-04-11

## 2019-04-11 RX ORDER — SODIUM CHLORIDE, SODIUM LACTATE, POTASSIUM CHLORIDE, CALCIUM CHLORIDE 600; 310; 30; 20 MG/100ML; MG/100ML; MG/100ML; MG/100ML
200 INJECTION, SOLUTION INTRAVENOUS CONTINUOUS
Status: DISCONTINUED | OUTPATIENT
Start: 2019-04-11 | End: 2019-04-16

## 2019-04-11 RX ORDER — NICOTINE 21 MG/24HR
1 PATCH, TRANSDERMAL 24 HOURS TRANSDERMAL EVERY 24 HOURS
Status: CANCELLED | OUTPATIENT
Start: 2019-04-11

## 2019-04-11 RX ORDER — PANTOPRAZOLE SODIUM 40 MG/1
40 TABLET, DELAYED RELEASE ORAL
Status: DISCONTINUED | OUTPATIENT
Start: 2019-04-11 | End: 2019-04-17 | Stop reason: HOSPADM

## 2019-04-11 RX ORDER — ONDANSETRON 4 MG/1
4 TABLET, ORALLY DISINTEGRATING ORAL ONCE
Status: COMPLETED | OUTPATIENT
Start: 2019-04-11 | End: 2019-04-11

## 2019-04-11 RX ADMIN — RIFAXIMIN 550 MG: 550 TABLET ORAL at 14:45

## 2019-04-11 RX ADMIN — HYDROMORPHONE HYDROCHLORIDE 1 MG: 1 INJECTION, SOLUTION INTRAMUSCULAR; INTRAVENOUS; SUBCUTANEOUS at 23:42

## 2019-04-11 RX ADMIN — ONDANSETRON 4 MG: 2 INJECTION INTRAMUSCULAR; INTRAVENOUS at 07:58

## 2019-04-11 RX ADMIN — ONDANSETRON 8 MG: 4 TABLET, ORALLY DISINTEGRATING ORAL at 23:43

## 2019-04-11 RX ADMIN — ONDANSETRON 4 MG: 4 TABLET, ORALLY DISINTEGRATING ORAL at 03:08

## 2019-04-11 RX ADMIN — HYDROMORPHONE HYDROCHLORIDE 1 MG: 1 INJECTION, SOLUTION INTRAMUSCULAR; INTRAVENOUS; SUBCUTANEOUS at 21:19

## 2019-04-11 RX ADMIN — HYDROMORPHONE HYDROCHLORIDE 1 MG: 1 INJECTION, SOLUTION INTRAMUSCULAR; INTRAVENOUS; SUBCUTANEOUS at 09:30

## 2019-04-11 RX ADMIN — PANTOPRAZOLE SODIUM 40 MG: 40 TABLET, DELAYED RELEASE ORAL at 08:48

## 2019-04-11 RX ADMIN — NIACIN 1000 MG: 500 TABLET, FILM COATED, EXTENDED RELEASE ORAL at 23:44

## 2019-04-11 RX ADMIN — HYDROMORPHONE HYDROCHLORIDE 1 MG: 1 INJECTION, SOLUTION INTRAMUSCULAR; INTRAVENOUS; SUBCUTANEOUS at 13:31

## 2019-04-11 RX ADMIN — ATORVASTATIN CALCIUM 80 MG: 40 TABLET, FILM COATED ORAL at 23:43

## 2019-04-11 RX ADMIN — SODIUM CHLORIDE, SODIUM GLUCONATE, SODIUM ACETATE, POTASSIUM CHLORIDE, MAGNESIUM CHLORIDE, SODIUM PHOSPHATE, DIBASIC, AND POTASSIUM PHOSPHATE 1000 ML: .53; .5; .37; .037; .03; .012; .00082 INJECTION, SOLUTION INTRAVENOUS at 06:28

## 2019-04-11 RX ADMIN — SODIUM CHLORIDE, SODIUM LACTATE, POTASSIUM CHLORIDE, AND CALCIUM CHLORIDE 250 ML/HR: .6; .31; .03; .02 INJECTION, SOLUTION INTRAVENOUS at 18:28

## 2019-04-11 RX ADMIN — SODIUM CHLORIDE 1000 ML: 0.9 INJECTION, SOLUTION INTRAVENOUS at 03:14

## 2019-04-11 RX ADMIN — SODIUM CHLORIDE 0.5 UNITS/HR: 9 INJECTION, SOLUTION INTRAVENOUS at 11:36

## 2019-04-11 RX ADMIN — HYDROMORPHONE HYDROCHLORIDE 1 MG: 1 INJECTION, SOLUTION INTRAMUSCULAR; INTRAVENOUS; SUBCUTANEOUS at 06:28

## 2019-04-11 RX ADMIN — SODIUM CHLORIDE, SODIUM LACTATE, POTASSIUM CHLORIDE, AND CALCIUM CHLORIDE 125 ML/HR: .6; .31; .03; .02 INJECTION, SOLUTION INTRAVENOUS at 08:56

## 2019-04-11 RX ADMIN — Medication 1000 MG: at 09:34

## 2019-04-11 RX ADMIN — IOHEXOL 100 ML: 350 INJECTION, SOLUTION INTRAVENOUS at 05:44

## 2019-04-11 RX ADMIN — HYDROMORPHONE HYDROCHLORIDE 1 MG: 1 INJECTION, SOLUTION INTRAMUSCULAR; INTRAVENOUS; SUBCUTANEOUS at 15:31

## 2019-04-11 RX ADMIN — ENOXAPARIN SODIUM 40 MG: 40 INJECTION SUBCUTANEOUS at 09:34

## 2019-04-11 RX ADMIN — ONDANSETRON 8 MG: 4 TABLET, ORALLY DISINTEGRATING ORAL at 14:45

## 2019-04-11 RX ADMIN — HYDROMORPHONE HYDROCHLORIDE 1 MG: 1 INJECTION, SOLUTION INTRAMUSCULAR; INTRAVENOUS; SUBCUTANEOUS at 03:13

## 2019-04-11 RX ADMIN — Medication 1000 MG: at 17:50

## 2019-04-11 RX ADMIN — HYDROMORPHONE HYDROCHLORIDE 1 MG: 1 INJECTION, SOLUTION INTRAMUSCULAR; INTRAVENOUS; SUBCUTANEOUS at 17:50

## 2019-04-11 RX ADMIN — RIFAXIMIN 550 MG: 550 TABLET ORAL at 08:48

## 2019-04-11 RX ADMIN — DEXTROSE AND SODIUM CHLORIDE 125 ML/HR: 5; .9 INJECTION, SOLUTION INTRAVENOUS at 11:32

## 2019-04-11 RX ADMIN — PREGABALIN 100 MG: 100 CAPSULE ORAL at 15:31

## 2019-04-11 RX ADMIN — MORPHINE SULFATE 6 MG: 10 INJECTION INTRAVENOUS at 05:36

## 2019-04-11 RX ADMIN — HYDROMORPHONE HYDROCHLORIDE 1 MG: 1 INJECTION, SOLUTION INTRAMUSCULAR; INTRAVENOUS; SUBCUTANEOUS at 04:20

## 2019-04-11 RX ADMIN — FAMOTIDINE 20 MG: 20 TABLET ORAL at 17:50

## 2019-04-11 RX ADMIN — DEXTROSE AND SODIUM CHLORIDE 125 ML/HR: 5; .9 INJECTION, SOLUTION INTRAVENOUS at 16:43

## 2019-04-11 RX ADMIN — HYDROMORPHONE HYDROCHLORIDE 1 MG: 1 INJECTION, SOLUTION INTRAMUSCULAR; INTRAVENOUS; SUBCUTANEOUS at 11:30

## 2019-04-11 RX ADMIN — HYDROMORPHONE HYDROCHLORIDE 1 MG: 1 INJECTION, SOLUTION INTRAMUSCULAR; INTRAVENOUS; SUBCUTANEOUS at 03:39

## 2019-04-11 RX ADMIN — FAMOTIDINE 20 MG: 20 TABLET ORAL at 09:33

## 2019-04-11 RX ADMIN — HYDROMORPHONE HYDROCHLORIDE 1 MG: 1 INJECTION, SOLUTION INTRAMUSCULAR; INTRAVENOUS; SUBCUTANEOUS at 19:14

## 2019-04-11 RX ADMIN — PREGABALIN 100 MG: 100 CAPSULE ORAL at 09:33

## 2019-04-11 RX ADMIN — FENOFIBRATE 145 MG: 145 TABLET, COATED ORAL at 09:34

## 2019-04-11 RX ADMIN — SODIUM CHLORIDE, SODIUM GLUCONATE, SODIUM ACETATE, POTASSIUM CHLORIDE, MAGNESIUM CHLORIDE, SODIUM PHOSPHATE, DIBASIC, AND POTASSIUM PHOSPHATE 1000 ML: .53; .5; .37; .037; .03; .012; .00082 INJECTION, SOLUTION INTRAVENOUS at 03:30

## 2019-04-11 RX ADMIN — PANTOPRAZOLE SODIUM 40 MG: 40 TABLET, DELAYED RELEASE ORAL at 17:50

## 2019-04-11 RX ADMIN — NICOTINE 1 PATCH: 21 PATCH, EXTENDED RELEASE TRANSDERMAL at 09:29

## 2019-04-11 RX ADMIN — RIFAXIMIN 550 MG: 550 TABLET ORAL at 23:42

## 2019-04-11 RX ADMIN — CALCIUM GLUCONATE 1 G: 98 INJECTION, SOLUTION INTRAVENOUS at 07:58

## 2019-04-12 ENCOUNTER — TELEPHONE (OUTPATIENT)
Dept: RADIOLOGY | Facility: CLINIC | Age: 44
End: 2019-04-12

## 2019-04-12 LAB
ALBUMIN SERPL BCP-MCNC: 3.8 G/DL (ref 3.5–5)
ALP SERPL-CCNC: 37 U/L (ref 46–116)
ALT SERPL W P-5'-P-CCNC: 42 U/L (ref 12–78)
ANION GAP SERPL CALCULATED.3IONS-SCNC: 6 MMOL/L (ref 4–13)
AST SERPL W P-5'-P-CCNC: 20 U/L (ref 5–45)
BILIRUB SERPL-MCNC: 1.6 MG/DL (ref 0.2–1)
BUN SERPL-MCNC: 5 MG/DL (ref 5–25)
CALCIUM SERPL-MCNC: 8.2 MG/DL (ref 8.3–10.1)
CHLORIDE SERPL-SCNC: 106 MMOL/L (ref 100–108)
CO2 SERPL-SCNC: 28 MMOL/L (ref 21–32)
CREAT SERPL-MCNC: 0.65 MG/DL (ref 0.6–1.3)
EST. AVERAGE GLUCOSE BLD GHB EST-MCNC: 111 MG/DL
GFR SERPL CREATININE-BSD FRML MDRD: 117 ML/MIN/1.73SQ M
GLUCOSE SERPL-MCNC: 113 MG/DL (ref 65–140)
HBA1C MFR BLD: 5.5 % (ref 4.2–6.3)
LIPASE SERPL-CCNC: 188 U/L (ref 73–393)
MAGNESIUM SERPL-MCNC: 1.9 MG/DL (ref 1.6–2.6)
POTASSIUM SERPL-SCNC: 3.9 MMOL/L (ref 3.5–5.3)
PROT SERPL-MCNC: 5.6 G/DL (ref 6.4–8.2)
SODIUM SERPL-SCNC: 140 MMOL/L (ref 136–145)
TRIGL SERPL-MCNC: 564 MG/DL
TSH SERPL DL<=0.05 MIU/L-ACNC: 0.96 UIU/ML (ref 0.36–3.74)

## 2019-04-12 PROCEDURE — 80053 COMPREHEN METABOLIC PANEL: CPT | Performed by: PHYSICIAN ASSISTANT

## 2019-04-12 PROCEDURE — 99232 SBSQ HOSP IP/OBS MODERATE 35: CPT | Performed by: INTERNAL MEDICINE

## 2019-04-12 PROCEDURE — 84443 ASSAY THYROID STIM HORMONE: CPT | Performed by: PHYSICIAN ASSISTANT

## 2019-04-12 PROCEDURE — 99232 SBSQ HOSP IP/OBS MODERATE 35: CPT | Performed by: NURSE PRACTITIONER

## 2019-04-12 PROCEDURE — 99252 IP/OBS CONSLTJ NEW/EST SF 35: CPT | Performed by: INTERNAL MEDICINE

## 2019-04-12 PROCEDURE — 83735 ASSAY OF MAGNESIUM: CPT | Performed by: PHYSICIAN ASSISTANT

## 2019-04-12 PROCEDURE — 83690 ASSAY OF LIPASE: CPT | Performed by: NURSE PRACTITIONER

## 2019-04-12 PROCEDURE — 84478 ASSAY OF TRIGLYCERIDES: CPT | Performed by: PHYSICIAN ASSISTANT

## 2019-04-12 PROCEDURE — 83036 HEMOGLOBIN GLYCOSYLATED A1C: CPT | Performed by: PHYSICIAN ASSISTANT

## 2019-04-12 RX ORDER — CHLORAL HYDRATE 500 MG
2000 CAPSULE ORAL 2 TIMES DAILY
Status: DISCONTINUED | OUTPATIENT
Start: 2019-04-12 | End: 2019-04-17 | Stop reason: HOSPADM

## 2019-04-12 RX ORDER — ALBUMIN, HUMAN INJ 5% 5 %
225 SOLUTION INTRAVENOUS ONCE
Status: DISCONTINUED | OUTPATIENT
Start: 2019-04-12 | End: 2019-04-13

## 2019-04-12 RX ORDER — NIACIN 500 MG/1
1500 TABLET, EXTENDED RELEASE ORAL
Status: DISCONTINUED | OUTPATIENT
Start: 2019-04-12 | End: 2019-04-17 | Stop reason: HOSPADM

## 2019-04-12 RX ORDER — ALBUMIN, HUMAN INJ 5% 5 %
225 SOLUTION INTRAVENOUS ONCE
Status: DISCONTINUED | OUTPATIENT
Start: 2019-04-12 | End: 2019-04-12

## 2019-04-12 RX ADMIN — RIFAXIMIN 550 MG: 550 TABLET ORAL at 06:32

## 2019-04-12 RX ADMIN — HYDROMORPHONE HYDROCHLORIDE 1 MG: 1 INJECTION, SOLUTION INTRAMUSCULAR; INTRAVENOUS; SUBCUTANEOUS at 13:14

## 2019-04-12 RX ADMIN — RIFAXIMIN 550 MG: 550 TABLET ORAL at 21:19

## 2019-04-12 RX ADMIN — HYDROMORPHONE HYDROCHLORIDE 1 MG: 1 INJECTION, SOLUTION INTRAMUSCULAR; INTRAVENOUS; SUBCUTANEOUS at 06:32

## 2019-04-12 RX ADMIN — ONDANSETRON 8 MG: 4 TABLET, ORALLY DISINTEGRATING ORAL at 06:32

## 2019-04-12 RX ADMIN — FAMOTIDINE 20 MG: 20 TABLET ORAL at 08:56

## 2019-04-12 RX ADMIN — FAMOTIDINE 20 MG: 20 TABLET ORAL at 17:21

## 2019-04-12 RX ADMIN — HYDROMORPHONE HYDROCHLORIDE 1 MG: 1 INJECTION, SOLUTION INTRAMUSCULAR; INTRAVENOUS; SUBCUTANEOUS at 03:41

## 2019-04-12 RX ADMIN — PANTOPRAZOLE SODIUM 40 MG: 40 TABLET, DELAYED RELEASE ORAL at 06:32

## 2019-04-12 RX ADMIN — SODIUM CHLORIDE, SODIUM LACTATE, POTASSIUM CHLORIDE, AND CALCIUM CHLORIDE 250 ML/HR: .6; .31; .03; .02 INJECTION, SOLUTION INTRAVENOUS at 10:27

## 2019-04-12 RX ADMIN — ATORVASTATIN CALCIUM 80 MG: 40 TABLET, FILM COATED ORAL at 17:20

## 2019-04-12 RX ADMIN — HYDROMORPHONE HYDROCHLORIDE 1 MG: 1 INJECTION, SOLUTION INTRAMUSCULAR; INTRAVENOUS; SUBCUTANEOUS at 01:26

## 2019-04-12 RX ADMIN — PREGABALIN 100 MG: 100 CAPSULE ORAL at 21:19

## 2019-04-12 RX ADMIN — NICOTINE 1 PATCH: 21 PATCH, EXTENDED RELEASE TRANSDERMAL at 09:00

## 2019-04-12 RX ADMIN — HYDROMORPHONE HYDROCHLORIDE 1 MG: 1 INJECTION, SOLUTION INTRAMUSCULAR; INTRAVENOUS; SUBCUTANEOUS at 17:15

## 2019-04-12 RX ADMIN — ENOXAPARIN SODIUM 40 MG: 40 INJECTION SUBCUTANEOUS at 09:00

## 2019-04-12 RX ADMIN — PREGABALIN 100 MG: 100 CAPSULE ORAL at 08:56

## 2019-04-12 RX ADMIN — HYDROMORPHONE HYDROCHLORIDE 1 MG: 1 INJECTION, SOLUTION INTRAMUSCULAR; INTRAVENOUS; SUBCUTANEOUS at 08:56

## 2019-04-12 RX ADMIN — PREGABALIN 100 MG: 100 CAPSULE ORAL at 17:20

## 2019-04-12 RX ADMIN — ONDANSETRON 8 MG: 4 TABLET, ORALLY DISINTEGRATING ORAL at 13:14

## 2019-04-12 RX ADMIN — PANTOPRAZOLE SODIUM 40 MG: 40 TABLET, DELAYED RELEASE ORAL at 17:21

## 2019-04-12 RX ADMIN — HYDROMORPHONE HYDROCHLORIDE 1 MG: 1 INJECTION, SOLUTION INTRAMUSCULAR; INTRAVENOUS; SUBCUTANEOUS at 21:18

## 2019-04-12 RX ADMIN — HYDROMORPHONE HYDROCHLORIDE 1 MG: 1 INJECTION, SOLUTION INTRAMUSCULAR; INTRAVENOUS; SUBCUTANEOUS at 23:25

## 2019-04-12 RX ADMIN — FENOFIBRATE 145 MG: 145 TABLET, COATED ORAL at 08:56

## 2019-04-12 RX ADMIN — Medication 2000 MG: at 17:21

## 2019-04-12 RX ADMIN — HYDROMORPHONE HYDROCHLORIDE 1 MG: 1 INJECTION, SOLUTION INTRAMUSCULAR; INTRAVENOUS; SUBCUTANEOUS at 10:59

## 2019-04-12 RX ADMIN — SODIUM CHLORIDE, SODIUM LACTATE, POTASSIUM CHLORIDE, AND CALCIUM CHLORIDE 250 ML/HR: .6; .31; .03; .02 INJECTION, SOLUTION INTRAVENOUS at 19:20

## 2019-04-12 RX ADMIN — Medication 2000 MG: at 08:56

## 2019-04-12 RX ADMIN — NIACIN 1500 MG: 500 TABLET, FILM COATED, EXTENDED RELEASE ORAL at 21:25

## 2019-04-12 RX ADMIN — HYDROMORPHONE HYDROCHLORIDE 1 MG: 1 INJECTION, SOLUTION INTRAMUSCULAR; INTRAVENOUS; SUBCUTANEOUS at 19:18

## 2019-04-12 RX ADMIN — SODIUM CHLORIDE, SODIUM LACTATE, POTASSIUM CHLORIDE, AND CALCIUM CHLORIDE 250 ML/HR: .6; .31; .03; .02 INJECTION, SOLUTION INTRAVENOUS at 15:11

## 2019-04-12 RX ADMIN — HYDROMORPHONE HYDROCHLORIDE 1 MG: 1 INJECTION, SOLUTION INTRAMUSCULAR; INTRAVENOUS; SUBCUTANEOUS at 15:12

## 2019-04-12 RX ADMIN — RIFAXIMIN 550 MG: 550 TABLET ORAL at 13:14

## 2019-04-12 RX ADMIN — ONDANSETRON 8 MG: 4 TABLET, ORALLY DISINTEGRATING ORAL at 21:19

## 2019-04-13 LAB
ANION GAP SERPL CALCULATED.3IONS-SCNC: 7 MMOL/L (ref 4–13)
BUN SERPL-MCNC: 5 MG/DL (ref 5–25)
CALCIUM SERPL-MCNC: 8.9 MG/DL (ref 8.3–10.1)
CHLORIDE SERPL-SCNC: 104 MMOL/L (ref 100–108)
CO2 SERPL-SCNC: 29 MMOL/L (ref 21–32)
CREAT SERPL-MCNC: 0.81 MG/DL (ref 0.6–1.3)
ERYTHROCYTE [DISTWIDTH] IN BLOOD BY AUTOMATED COUNT: 12.7 % (ref 11.6–15.1)
GFR SERPL CREATININE-BSD FRML MDRD: 107 ML/MIN/1.73SQ M
GLUCOSE SERPL-MCNC: 128 MG/DL (ref 65–140)
HCT VFR BLD AUTO: 36.6 % (ref 36.5–49.3)
HGB BLD-MCNC: 12 G/DL (ref 12–17)
LIPASE SERPL-CCNC: 87 U/L (ref 73–393)
MAGNESIUM SERPL-MCNC: 1.8 MG/DL (ref 1.6–2.6)
MCH RBC QN AUTO: 31.6 PG (ref 26.8–34.3)
MCHC RBC AUTO-ENTMCNC: 32.8 G/DL (ref 31.4–37.4)
MCV RBC AUTO: 96 FL (ref 82–98)
PHOSPHATE SERPL-MCNC: 3.4 MG/DL (ref 2.7–4.5)
PLATELET # BLD AUTO: 95 THOUSANDS/UL (ref 149–390)
PMV BLD AUTO: 11.2 FL (ref 8.9–12.7)
POTASSIUM SERPL-SCNC: 3.6 MMOL/L (ref 3.5–5.3)
RBC # BLD AUTO: 3.8 MILLION/UL (ref 3.88–5.62)
SODIUM SERPL-SCNC: 140 MMOL/L (ref 136–145)
TRIGL SERPL-MCNC: 393 MG/DL
WBC # BLD AUTO: 4.67 THOUSAND/UL (ref 4.31–10.16)

## 2019-04-13 PROCEDURE — 80048 BASIC METABOLIC PNL TOTAL CA: CPT | Performed by: NURSE PRACTITIONER

## 2019-04-13 PROCEDURE — 84478 ASSAY OF TRIGLYCERIDES: CPT | Performed by: NURSE PRACTITIONER

## 2019-04-13 PROCEDURE — 84100 ASSAY OF PHOSPHORUS: CPT | Performed by: NURSE PRACTITIONER

## 2019-04-13 PROCEDURE — 83735 ASSAY OF MAGNESIUM: CPT | Performed by: NURSE PRACTITIONER

## 2019-04-13 PROCEDURE — 83690 ASSAY OF LIPASE: CPT | Performed by: NURSE PRACTITIONER

## 2019-04-13 PROCEDURE — 99232 SBSQ HOSP IP/OBS MODERATE 35: CPT | Performed by: INTERNAL MEDICINE

## 2019-04-13 PROCEDURE — 85027 COMPLETE CBC AUTOMATED: CPT | Performed by: NURSE PRACTITIONER

## 2019-04-13 PROCEDURE — 99232 SBSQ HOSP IP/OBS MODERATE 35: CPT | Performed by: PHYSICIAN ASSISTANT

## 2019-04-13 RX ORDER — ALBUMIN, HUMAN INJ 5% 5 %
225 SOLUTION INTRAVENOUS ONCE
Status: COMPLETED | OUTPATIENT
Start: 2019-04-13 | End: 2019-04-13

## 2019-04-13 RX ADMIN — SODIUM CHLORIDE, SODIUM LACTATE, POTASSIUM CHLORIDE, AND CALCIUM CHLORIDE 250 ML/HR: .6; .31; .03; .02 INJECTION, SOLUTION INTRAVENOUS at 13:13

## 2019-04-13 RX ADMIN — HYDROMORPHONE HYDROCHLORIDE 1 MG: 1 INJECTION, SOLUTION INTRAMUSCULAR; INTRAVENOUS; SUBCUTANEOUS at 07:37

## 2019-04-13 RX ADMIN — PREGABALIN 100 MG: 100 CAPSULE ORAL at 16:22

## 2019-04-13 RX ADMIN — HYDROMORPHONE HYDROCHLORIDE 1 MG: 1 INJECTION, SOLUTION INTRAMUSCULAR; INTRAVENOUS; SUBCUTANEOUS at 20:56

## 2019-04-13 RX ADMIN — SODIUM CHLORIDE, SODIUM LACTATE, POTASSIUM CHLORIDE, AND CALCIUM CHLORIDE 250 ML/HR: .6; .31; .03; .02 INJECTION, SOLUTION INTRAVENOUS at 04:17

## 2019-04-13 RX ADMIN — HYDROMORPHONE HYDROCHLORIDE 1 MG: 1 INJECTION, SOLUTION INTRAMUSCULAR; INTRAVENOUS; SUBCUTANEOUS at 05:52

## 2019-04-13 RX ADMIN — ATORVASTATIN CALCIUM 80 MG: 40 TABLET, FILM COATED ORAL at 16:22

## 2019-04-13 RX ADMIN — SODIUM CHLORIDE, SODIUM LACTATE, POTASSIUM CHLORIDE, AND CALCIUM CHLORIDE 250 ML/HR: .6; .31; .03; .02 INJECTION, SOLUTION INTRAVENOUS at 17:33

## 2019-04-13 RX ADMIN — ALBUMIN (HUMAN) 225 G: 12.5 SOLUTION INTRAVENOUS at 08:24

## 2019-04-13 RX ADMIN — PREGABALIN 100 MG: 100 CAPSULE ORAL at 09:49

## 2019-04-13 RX ADMIN — SODIUM CHLORIDE, SODIUM LACTATE, POTASSIUM CHLORIDE, AND CALCIUM CHLORIDE 250 ML/HR: .6; .31; .03; .02 INJECTION, SOLUTION INTRAVENOUS at 08:23

## 2019-04-13 RX ADMIN — SODIUM CHLORIDE, SODIUM LACTATE, POTASSIUM CHLORIDE, AND CALCIUM CHLORIDE 250 ML/HR: .6; .31; .03; .02 INJECTION, SOLUTION INTRAVENOUS at 00:10

## 2019-04-13 RX ADMIN — ONDANSETRON 8 MG: 4 TABLET, ORALLY DISINTEGRATING ORAL at 14:32

## 2019-04-13 RX ADMIN — NICOTINE 1 PATCH: 21 PATCH, EXTENDED RELEASE TRANSDERMAL at 09:51

## 2019-04-13 RX ADMIN — HYDROMORPHONE HYDROCHLORIDE 1 MG: 1 INJECTION, SOLUTION INTRAMUSCULAR; INTRAVENOUS; SUBCUTANEOUS at 16:22

## 2019-04-13 RX ADMIN — Medication 2000 MG: at 09:49

## 2019-04-13 RX ADMIN — PANTOPRAZOLE SODIUM 40 MG: 40 TABLET, DELAYED RELEASE ORAL at 16:22

## 2019-04-13 RX ADMIN — PANTOPRAZOLE SODIUM 40 MG: 40 TABLET, DELAYED RELEASE ORAL at 06:05

## 2019-04-13 RX ADMIN — RIFAXIMIN 550 MG: 550 TABLET ORAL at 14:32

## 2019-04-13 RX ADMIN — RIFAXIMIN 550 MG: 550 TABLET ORAL at 05:52

## 2019-04-13 RX ADMIN — HYDROMORPHONE HYDROCHLORIDE 1 MG: 1 INJECTION, SOLUTION INTRAMUSCULAR; INTRAVENOUS; SUBCUTANEOUS at 18:34

## 2019-04-13 RX ADMIN — FENOFIBRATE 145 MG: 145 TABLET, COATED ORAL at 09:49

## 2019-04-13 RX ADMIN — ENOXAPARIN SODIUM 40 MG: 40 INJECTION SUBCUTANEOUS at 09:50

## 2019-04-13 RX ADMIN — HYDROMORPHONE HYDROCHLORIDE 1 MG: 1 INJECTION, SOLUTION INTRAMUSCULAR; INTRAVENOUS; SUBCUTANEOUS at 03:39

## 2019-04-13 RX ADMIN — HYDROMORPHONE HYDROCHLORIDE 1 MG: 1 INJECTION, SOLUTION INTRAMUSCULAR; INTRAVENOUS; SUBCUTANEOUS at 09:49

## 2019-04-13 RX ADMIN — CALCIUM GLUCONATE 1 G: 98 INJECTION, SOLUTION INTRAVENOUS at 08:26

## 2019-04-13 RX ADMIN — Medication 2000 MG: at 17:33

## 2019-04-13 RX ADMIN — SODIUM CHLORIDE, SODIUM LACTATE, POTASSIUM CHLORIDE, AND CALCIUM CHLORIDE 250 ML/HR: .6; .31; .03; .02 INJECTION, SOLUTION INTRAVENOUS at 22:02

## 2019-04-13 RX ADMIN — HYDROMORPHONE HYDROCHLORIDE 1 MG: 1 INJECTION, SOLUTION INTRAMUSCULAR; INTRAVENOUS; SUBCUTANEOUS at 14:32

## 2019-04-13 RX ADMIN — FAMOTIDINE 20 MG: 20 TABLET ORAL at 09:49

## 2019-04-13 RX ADMIN — FAMOTIDINE 20 MG: 20 TABLET ORAL at 17:33

## 2019-04-13 RX ADMIN — HYDROMORPHONE HYDROCHLORIDE 1 MG: 1 INJECTION, SOLUTION INTRAMUSCULAR; INTRAVENOUS; SUBCUTANEOUS at 12:20

## 2019-04-13 RX ADMIN — ONDANSETRON 8 MG: 4 TABLET, ORALLY DISINTEGRATING ORAL at 05:52

## 2019-04-13 RX ADMIN — HYDROMORPHONE HYDROCHLORIDE 1 MG: 1 INJECTION, SOLUTION INTRAMUSCULAR; INTRAVENOUS; SUBCUTANEOUS at 23:23

## 2019-04-14 ENCOUNTER — APPOINTMENT (INPATIENT)
Dept: CT IMAGING | Facility: HOSPITAL | Age: 44
DRG: 440 | End: 2019-04-14
Payer: COMMERCIAL

## 2019-04-14 ENCOUNTER — APPOINTMENT (INPATIENT)
Dept: ULTRASOUND IMAGING | Facility: HOSPITAL | Age: 44
DRG: 440 | End: 2019-04-14
Payer: COMMERCIAL

## 2019-04-14 LAB
ALBUMIN SERPL BCP-MCNC: 4.3 G/DL (ref 3.5–5)
ALBUMIN SERPL BCP-MCNC: 4.5 G/DL (ref 3.5–5)
ALP SERPL-CCNC: 34 U/L (ref 46–116)
ALP SERPL-CCNC: 35 U/L (ref 46–116)
ALT SERPL W P-5'-P-CCNC: 25 U/L (ref 12–78)
ALT SERPL W P-5'-P-CCNC: 25 U/L (ref 12–78)
ANION GAP SERPL CALCULATED.3IONS-SCNC: 5 MMOL/L (ref 4–13)
ANION GAP SERPL CALCULATED.3IONS-SCNC: 5 MMOL/L (ref 4–13)
AST SERPL W P-5'-P-CCNC: 14 U/L (ref 5–45)
AST SERPL W P-5'-P-CCNC: 14 U/L (ref 5–45)
BASOPHILS # BLD AUTO: 0.02 THOUSANDS/ΜL (ref 0–0.1)
BASOPHILS # BLD AUTO: 0.02 THOUSANDS/ΜL (ref 0–0.1)
BASOPHILS NFR BLD AUTO: 0 % (ref 0–1)
BASOPHILS NFR BLD AUTO: 0 % (ref 0–1)
BILIRUB SERPL-MCNC: 0.7 MG/DL (ref 0.2–1)
BILIRUB SERPL-MCNC: 1.5 MG/DL (ref 0.2–1)
BUN SERPL-MCNC: 6 MG/DL (ref 5–25)
BUN SERPL-MCNC: 6 MG/DL (ref 5–25)
CALCIUM SERPL-MCNC: 9.1 MG/DL (ref 8.3–10.1)
CALCIUM SERPL-MCNC: 9.1 MG/DL (ref 8.3–10.1)
CHLORIDE SERPL-SCNC: 103 MMOL/L (ref 100–108)
CHLORIDE SERPL-SCNC: 104 MMOL/L (ref 100–108)
CO2 SERPL-SCNC: 31 MMOL/L (ref 21–32)
CO2 SERPL-SCNC: 31 MMOL/L (ref 21–32)
CREAT SERPL-MCNC: 0.92 MG/DL (ref 0.6–1.3)
CREAT SERPL-MCNC: 0.96 MG/DL (ref 0.6–1.3)
EOSINOPHIL # BLD AUTO: 0.12 THOUSAND/ΜL (ref 0–0.61)
EOSINOPHIL # BLD AUTO: 0.13 THOUSAND/ΜL (ref 0–0.61)
EOSINOPHIL NFR BLD AUTO: 3 % (ref 0–6)
EOSINOPHIL NFR BLD AUTO: 3 % (ref 0–6)
ERYTHROCYTE [DISTWIDTH] IN BLOOD BY AUTOMATED COUNT: 12.5 % (ref 11.6–15.1)
ERYTHROCYTE [DISTWIDTH] IN BLOOD BY AUTOMATED COUNT: 12.5 % (ref 11.6–15.1)
GFR SERPL CREATININE-BSD FRML MDRD: 94 ML/MIN/1.73SQ M
GFR SERPL CREATININE-BSD FRML MDRD: 99 ML/MIN/1.73SQ M
GLUCOSE SERPL-MCNC: 106 MG/DL (ref 65–140)
GLUCOSE SERPL-MCNC: 111 MG/DL (ref 65–140)
HCT VFR BLD AUTO: 35 % (ref 36.5–49.3)
HCT VFR BLD AUTO: 36.6 % (ref 36.5–49.3)
HGB BLD-MCNC: 11.8 G/DL (ref 12–17)
HGB BLD-MCNC: 12.1 G/DL (ref 12–17)
IMM GRANULOCYTES # BLD AUTO: 0.02 THOUSAND/UL (ref 0–0.2)
IMM GRANULOCYTES # BLD AUTO: 0.02 THOUSAND/UL (ref 0–0.2)
IMM GRANULOCYTES NFR BLD AUTO: 0 % (ref 0–2)
IMM GRANULOCYTES NFR BLD AUTO: 0 % (ref 0–2)
LIPASE SERPL-CCNC: 73 U/L (ref 73–393)
LYMPHOCYTES # BLD AUTO: 1.48 THOUSANDS/ΜL (ref 0.6–4.47)
LYMPHOCYTES # BLD AUTO: 1.57 THOUSANDS/ΜL (ref 0.6–4.47)
LYMPHOCYTES NFR BLD AUTO: 32 % (ref 14–44)
LYMPHOCYTES NFR BLD AUTO: 33 % (ref 14–44)
MAGNESIUM SERPL-MCNC: 1.7 MG/DL (ref 1.6–2.6)
MCH RBC QN AUTO: 31.8 PG (ref 26.8–34.3)
MCH RBC QN AUTO: 32.2 PG (ref 26.8–34.3)
MCHC RBC AUTO-ENTMCNC: 33.1 G/DL (ref 31.4–37.4)
MCHC RBC AUTO-ENTMCNC: 33.7 G/DL (ref 31.4–37.4)
MCV RBC AUTO: 96 FL (ref 82–98)
MCV RBC AUTO: 96 FL (ref 82–98)
MONOCYTES # BLD AUTO: 0.38 THOUSAND/ΜL (ref 0.17–1.22)
MONOCYTES # BLD AUTO: 0.44 THOUSAND/ΜL (ref 0.17–1.22)
MONOCYTES NFR BLD AUTO: 10 % (ref 4–12)
MONOCYTES NFR BLD AUTO: 8 % (ref 4–12)
NEUTROPHILS # BLD AUTO: 2.53 THOUSANDS/ΜL (ref 1.85–7.62)
NEUTROPHILS # BLD AUTO: 2.67 THOUSANDS/ΜL (ref 1.85–7.62)
NEUTS SEG NFR BLD AUTO: 55 % (ref 43–75)
NEUTS SEG NFR BLD AUTO: 56 % (ref 43–75)
NRBC BLD AUTO-RTO: 0 /100 WBCS
NRBC BLD AUTO-RTO: 0 /100 WBCS
PHOSPHATE SERPL-MCNC: 4.1 MG/DL (ref 2.7–4.5)
PLATELET # BLD AUTO: 100 THOUSANDS/UL (ref 149–390)
PLATELET # BLD AUTO: 103 THOUSANDS/UL (ref 149–390)
PMV BLD AUTO: 11.2 FL (ref 8.9–12.7)
PMV BLD AUTO: 11.5 FL (ref 8.9–12.7)
POTASSIUM SERPL-SCNC: 3.6 MMOL/L (ref 3.5–5.3)
POTASSIUM SERPL-SCNC: 3.7 MMOL/L (ref 3.5–5.3)
PROT SERPL-MCNC: 5.9 G/DL (ref 6.4–8.2)
PROT SERPL-MCNC: 6 G/DL (ref 6.4–8.2)
RBC # BLD AUTO: 3.66 MILLION/UL (ref 3.88–5.62)
RBC # BLD AUTO: 3.8 MILLION/UL (ref 3.88–5.62)
SODIUM SERPL-SCNC: 139 MMOL/L (ref 136–145)
SODIUM SERPL-SCNC: 140 MMOL/L (ref 136–145)
TRIGL SERPL-MCNC: 188 MG/DL
WBC # BLD AUTO: 4.61 THOUSAND/UL (ref 4.31–10.16)
WBC # BLD AUTO: 4.79 THOUSAND/UL (ref 4.31–10.16)

## 2019-04-14 PROCEDURE — 99232 SBSQ HOSP IP/OBS MODERATE 35: CPT | Performed by: INTERNAL MEDICINE

## 2019-04-14 PROCEDURE — 83690 ASSAY OF LIPASE: CPT | Performed by: PHYSICIAN ASSISTANT

## 2019-04-14 PROCEDURE — 99232 SBSQ HOSP IP/OBS MODERATE 35: CPT | Performed by: PHYSICIAN ASSISTANT

## 2019-04-14 PROCEDURE — 84100 ASSAY OF PHOSPHORUS: CPT | Performed by: PHYSICIAN ASSISTANT

## 2019-04-14 PROCEDURE — 74177 CT ABD & PELVIS W/CONTRAST: CPT

## 2019-04-14 PROCEDURE — 83735 ASSAY OF MAGNESIUM: CPT | Performed by: PHYSICIAN ASSISTANT

## 2019-04-14 PROCEDURE — 84478 ASSAY OF TRIGLYCERIDES: CPT | Performed by: PHYSICIAN ASSISTANT

## 2019-04-14 PROCEDURE — 80053 COMPREHEN METABOLIC PANEL: CPT | Performed by: PHYSICIAN ASSISTANT

## 2019-04-14 PROCEDURE — 85025 COMPLETE CBC W/AUTO DIFF WBC: CPT | Performed by: PHYSICIAN ASSISTANT

## 2019-04-14 PROCEDURE — 76700 US EXAM ABDOM COMPLETE: CPT

## 2019-04-14 RX ORDER — KETOROLAC TROMETHAMINE 30 MG/ML
30 INJECTION, SOLUTION INTRAMUSCULAR; INTRAVENOUS ONCE
Status: COMPLETED | OUTPATIENT
Start: 2019-04-14 | End: 2019-04-14

## 2019-04-14 RX ORDER — SUCRALFATE ORAL 1 G/10ML
1000 SUSPENSION ORAL
Status: DISCONTINUED | OUTPATIENT
Start: 2019-04-14 | End: 2019-04-17 | Stop reason: HOSPADM

## 2019-04-14 RX ORDER — HYDROMORPHONE HCL/PF 1 MG/ML
1 SYRINGE (ML) INJECTION ONCE
Status: COMPLETED | OUTPATIENT
Start: 2019-04-14 | End: 2019-04-14

## 2019-04-14 RX ADMIN — ONDANSETRON 8 MG: 4 TABLET, ORALLY DISINTEGRATING ORAL at 13:39

## 2019-04-14 RX ADMIN — HYDROMORPHONE HYDROCHLORIDE 1 MG: 1 INJECTION, SOLUTION INTRAMUSCULAR; INTRAVENOUS; SUBCUTANEOUS at 17:41

## 2019-04-14 RX ADMIN — Medication 2000 MG: at 17:40

## 2019-04-14 RX ADMIN — HYDROMORPHONE HYDROCHLORIDE 1 MG: 1 INJECTION, SOLUTION INTRAMUSCULAR; INTRAVENOUS; SUBCUTANEOUS at 19:43

## 2019-04-14 RX ADMIN — RIFAXIMIN 550 MG: 550 TABLET ORAL at 21:50

## 2019-04-14 RX ADMIN — PREGABALIN 100 MG: 100 CAPSULE ORAL at 00:45

## 2019-04-14 RX ADMIN — PREGABALIN 100 MG: 100 CAPSULE ORAL at 09:07

## 2019-04-14 RX ADMIN — SODIUM CHLORIDE, SODIUM LACTATE, POTASSIUM CHLORIDE, AND CALCIUM CHLORIDE 250 ML/HR: .6; .31; .03; .02 INJECTION, SOLUTION INTRAVENOUS at 11:22

## 2019-04-14 RX ADMIN — PANCRELIPASE 36000 UNITS: 24000; 76000; 120000 CAPSULE, DELAYED RELEASE PELLETS ORAL at 00:46

## 2019-04-14 RX ADMIN — HYDROMORPHONE HYDROCHLORIDE 1 MG: 1 INJECTION, SOLUTION INTRAMUSCULAR; INTRAVENOUS; SUBCUTANEOUS at 02:34

## 2019-04-14 RX ADMIN — PANTOPRAZOLE SODIUM 40 MG: 40 TABLET, DELAYED RELEASE ORAL at 06:36

## 2019-04-14 RX ADMIN — SUCRALFATE 1000 MG: 1 SUSPENSION ORAL at 17:40

## 2019-04-14 RX ADMIN — HYDROMORPHONE HYDROCHLORIDE 1 MG: 1 INJECTION, SOLUTION INTRAMUSCULAR; INTRAVENOUS; SUBCUTANEOUS at 04:30

## 2019-04-14 RX ADMIN — HYDROMORPHONE HYDROCHLORIDE 1 MG: 1 INJECTION, SOLUTION INTRAMUSCULAR; INTRAVENOUS; SUBCUTANEOUS at 00:43

## 2019-04-14 RX ADMIN — ENOXAPARIN SODIUM 40 MG: 40 INJECTION SUBCUTANEOUS at 09:06

## 2019-04-14 RX ADMIN — FENOFIBRATE 145 MG: 145 TABLET, COATED ORAL at 09:06

## 2019-04-14 RX ADMIN — PREGABALIN 100 MG: 100 CAPSULE ORAL at 17:41

## 2019-04-14 RX ADMIN — FAMOTIDINE 20 MG: 20 TABLET ORAL at 09:06

## 2019-04-14 RX ADMIN — HYDROMORPHONE HYDROCHLORIDE 1 MG: 1 INJECTION, SOLUTION INTRAMUSCULAR; INTRAVENOUS; SUBCUTANEOUS at 09:02

## 2019-04-14 RX ADMIN — Medication 2000 MG: at 09:07

## 2019-04-14 RX ADMIN — ATORVASTATIN CALCIUM 80 MG: 40 TABLET, FILM COATED ORAL at 17:40

## 2019-04-14 RX ADMIN — NIACIN 1500 MG: 500 TABLET, FILM COATED, EXTENDED RELEASE ORAL at 21:50

## 2019-04-14 RX ADMIN — HYDROMORPHONE HYDROCHLORIDE 1 MG: 1 INJECTION, SOLUTION INTRAMUSCULAR; INTRAVENOUS; SUBCUTANEOUS at 21:49

## 2019-04-14 RX ADMIN — HYDROMORPHONE HYDROCHLORIDE 1 MG: 1 INJECTION, SOLUTION INTRAMUSCULAR; INTRAVENOUS; SUBCUTANEOUS at 13:38

## 2019-04-14 RX ADMIN — ONDANSETRON 8 MG: 4 TABLET, ORALLY DISINTEGRATING ORAL at 00:45

## 2019-04-14 RX ADMIN — ONDANSETRON 8 MG: 4 TABLET, ORALLY DISINTEGRATING ORAL at 21:50

## 2019-04-14 RX ADMIN — IOHEXOL 100 ML: 350 INJECTION, SOLUTION INTRAVENOUS at 19:55

## 2019-04-14 RX ADMIN — NIACIN 1500 MG: 500 TABLET, FILM COATED, EXTENDED RELEASE ORAL at 00:51

## 2019-04-14 RX ADMIN — PREGABALIN 100 MG: 100 CAPSULE ORAL at 21:50

## 2019-04-14 RX ADMIN — KETOROLAC TROMETHAMINE 30 MG: 30 INJECTION, SOLUTION INTRAMUSCULAR at 00:43

## 2019-04-14 RX ADMIN — NICOTINE 1 PATCH: 21 PATCH, EXTENDED RELEASE TRANSDERMAL at 09:06

## 2019-04-14 RX ADMIN — SUCRALFATE 1000 MG: 1 SUSPENSION ORAL at 21:50

## 2019-04-14 RX ADMIN — HYDROMORPHONE HYDROCHLORIDE 1 MG: 1 INJECTION, SOLUTION INTRAMUSCULAR; INTRAVENOUS; SUBCUTANEOUS at 15:40

## 2019-04-14 RX ADMIN — FAMOTIDINE 20 MG: 20 TABLET ORAL at 17:40

## 2019-04-14 RX ADMIN — RIFAXIMIN 550 MG: 550 TABLET ORAL at 00:45

## 2019-04-14 RX ADMIN — RIFAXIMIN 550 MG: 550 TABLET ORAL at 06:29

## 2019-04-14 RX ADMIN — PANTOPRAZOLE SODIUM 40 MG: 40 TABLET, DELAYED RELEASE ORAL at 17:40

## 2019-04-14 RX ADMIN — ONDANSETRON 8 MG: 4 TABLET, ORALLY DISINTEGRATING ORAL at 06:29

## 2019-04-14 RX ADMIN — HYDROMORPHONE HYDROCHLORIDE 1 MG: 1 INJECTION, SOLUTION INTRAMUSCULAR; INTRAVENOUS; SUBCUTANEOUS at 06:29

## 2019-04-14 RX ADMIN — SUCRALFATE 1000 MG: 1 SUSPENSION ORAL at 13:39

## 2019-04-14 RX ADMIN — SODIUM CHLORIDE, SODIUM LACTATE, POTASSIUM CHLORIDE, AND CALCIUM CHLORIDE 250 ML/HR: .6; .31; .03; .02 INJECTION, SOLUTION INTRAVENOUS at 15:41

## 2019-04-14 RX ADMIN — RIFAXIMIN 550 MG: 550 TABLET ORAL at 13:39

## 2019-04-14 RX ADMIN — HYDROMORPHONE HYDROCHLORIDE 1 MG: 1 INJECTION, SOLUTION INTRAMUSCULAR; INTRAVENOUS; SUBCUTANEOUS at 11:22

## 2019-04-15 LAB
ALBUMIN SERPL BCP-MCNC: 4.3 G/DL (ref 3.5–5)
ALP SERPL-CCNC: 37 U/L (ref 46–116)
ALT SERPL W P-5'-P-CCNC: 25 U/L (ref 12–78)
ANION GAP SERPL CALCULATED.3IONS-SCNC: 7 MMOL/L (ref 4–13)
AST SERPL W P-5'-P-CCNC: 16 U/L (ref 5–45)
BASOPHILS # BLD AUTO: 0.02 THOUSANDS/ΜL (ref 0–0.1)
BASOPHILS NFR BLD AUTO: 0 % (ref 0–1)
BILIRUB SERPL-MCNC: 1.4 MG/DL (ref 0.2–1)
BUN SERPL-MCNC: 5 MG/DL (ref 5–25)
CALCIUM SERPL-MCNC: 9.6 MG/DL (ref 8.3–10.1)
CHLORIDE SERPL-SCNC: 103 MMOL/L (ref 100–108)
CO2 SERPL-SCNC: 30 MMOL/L (ref 21–32)
CREAT SERPL-MCNC: 0.88 MG/DL (ref 0.6–1.3)
EOSINOPHIL # BLD AUTO: 0.12 THOUSAND/ΜL (ref 0–0.61)
EOSINOPHIL NFR BLD AUTO: 2 % (ref 0–6)
ERYTHROCYTE [DISTWIDTH] IN BLOOD BY AUTOMATED COUNT: 12.2 % (ref 11.6–15.1)
GFR SERPL CREATININE-BSD FRML MDRD: 103 ML/MIN/1.73SQ M
GLUCOSE SERPL-MCNC: 108 MG/DL (ref 65–140)
HCT VFR BLD AUTO: 38 % (ref 36.5–49.3)
HGB BLD-MCNC: 12.7 G/DL (ref 12–17)
IMM GRANULOCYTES # BLD AUTO: 0.02 THOUSAND/UL (ref 0–0.2)
IMM GRANULOCYTES NFR BLD AUTO: 0 % (ref 0–2)
LIPASE SERPL-CCNC: 70 U/L (ref 73–393)
LYMPHOCYTES # BLD AUTO: 1.23 THOUSANDS/ΜL (ref 0.6–4.47)
LYMPHOCYTES NFR BLD AUTO: 21 % (ref 14–44)
MCH RBC QN AUTO: 32.1 PG (ref 26.8–34.3)
MCHC RBC AUTO-ENTMCNC: 33.4 G/DL (ref 31.4–37.4)
MCV RBC AUTO: 96 FL (ref 82–98)
MONOCYTES # BLD AUTO: 0.48 THOUSAND/ΜL (ref 0.17–1.22)
MONOCYTES NFR BLD AUTO: 8 % (ref 4–12)
NEUTROPHILS # BLD AUTO: 3.93 THOUSANDS/ΜL (ref 1.85–7.62)
NEUTS SEG NFR BLD AUTO: 69 % (ref 43–75)
NRBC BLD AUTO-RTO: 0 /100 WBCS
PLATELET # BLD AUTO: 124 THOUSANDS/UL (ref 149–390)
PMV BLD AUTO: 10.6 FL (ref 8.9–12.7)
POTASSIUM SERPL-SCNC: 3.8 MMOL/L (ref 3.5–5.3)
PROT SERPL-MCNC: 6.5 G/DL (ref 6.4–8.2)
RBC # BLD AUTO: 3.96 MILLION/UL (ref 3.88–5.62)
SODIUM SERPL-SCNC: 140 MMOL/L (ref 136–145)
WBC # BLD AUTO: 5.8 THOUSAND/UL (ref 4.31–10.16)

## 2019-04-15 PROCEDURE — 99232 SBSQ HOSP IP/OBS MODERATE 35: CPT | Performed by: INTERNAL MEDICINE

## 2019-04-15 PROCEDURE — 83690 ASSAY OF LIPASE: CPT | Performed by: PHYSICIAN ASSISTANT

## 2019-04-15 PROCEDURE — 80053 COMPREHEN METABOLIC PANEL: CPT | Performed by: PHYSICIAN ASSISTANT

## 2019-04-15 PROCEDURE — 99232 SBSQ HOSP IP/OBS MODERATE 35: CPT | Performed by: PHYSICIAN ASSISTANT

## 2019-04-15 PROCEDURE — 85025 COMPLETE CBC W/AUTO DIFF WBC: CPT | Performed by: PHYSICIAN ASSISTANT

## 2019-04-15 RX ORDER — POLYETHYLENE GLYCOL 3350 17 G/17G
17 POWDER, FOR SOLUTION ORAL DAILY
Status: DISCONTINUED | OUTPATIENT
Start: 2019-04-15 | End: 2019-04-17 | Stop reason: HOSPADM

## 2019-04-15 RX ADMIN — FENOFIBRATE 145 MG: 145 TABLET, COATED ORAL at 09:11

## 2019-04-15 RX ADMIN — PANTOPRAZOLE SODIUM 40 MG: 40 TABLET, DELAYED RELEASE ORAL at 17:08

## 2019-04-15 RX ADMIN — HYDROMORPHONE HYDROCHLORIDE 1 MG: 1 INJECTION, SOLUTION INTRAMUSCULAR; INTRAVENOUS; SUBCUTANEOUS at 06:49

## 2019-04-15 RX ADMIN — HYDROMORPHONE HYDROCHLORIDE 1 MG: 1 INJECTION, SOLUTION INTRAMUSCULAR; INTRAVENOUS; SUBCUTANEOUS at 21:10

## 2019-04-15 RX ADMIN — RIFAXIMIN 550 MG: 550 TABLET ORAL at 06:46

## 2019-04-15 RX ADMIN — SUCRALFATE 1000 MG: 1 SUSPENSION ORAL at 06:46

## 2019-04-15 RX ADMIN — SUCRALFATE 1000 MG: 1 SUSPENSION ORAL at 17:00

## 2019-04-15 RX ADMIN — Medication 2000 MG: at 09:11

## 2019-04-15 RX ADMIN — PANTOPRAZOLE SODIUM 40 MG: 40 TABLET, DELAYED RELEASE ORAL at 06:46

## 2019-04-15 RX ADMIN — Medication 2000 MG: at 21:09

## 2019-04-15 RX ADMIN — HYDROMORPHONE HYDROCHLORIDE 1 MG: 1 INJECTION, SOLUTION INTRAMUSCULAR; INTRAVENOUS; SUBCUTANEOUS at 12:22

## 2019-04-15 RX ADMIN — SODIUM CHLORIDE, SODIUM LACTATE, POTASSIUM CHLORIDE, AND CALCIUM CHLORIDE 250 ML/HR: .6; .31; .03; .02 INJECTION, SOLUTION INTRAVENOUS at 00:40

## 2019-04-15 RX ADMIN — PANCRELIPASE 36000 UNITS: 24000; 76000; 120000 CAPSULE, DELAYED RELEASE PELLETS ORAL at 12:25

## 2019-04-15 RX ADMIN — PREGABALIN 100 MG: 100 CAPSULE ORAL at 09:12

## 2019-04-15 RX ADMIN — ONDANSETRON 8 MG: 4 TABLET, ORALLY DISINTEGRATING ORAL at 21:10

## 2019-04-15 RX ADMIN — NIACIN 1500 MG: 500 TABLET, FILM COATED, EXTENDED RELEASE ORAL at 21:12

## 2019-04-15 RX ADMIN — PANCRELIPASE 36000 UNITS: 24000; 76000; 120000 CAPSULE, DELAYED RELEASE PELLETS ORAL at 17:09

## 2019-04-15 RX ADMIN — HYDROMORPHONE HYDROCHLORIDE 1 MG: 1 INJECTION, SOLUTION INTRAMUSCULAR; INTRAVENOUS; SUBCUTANEOUS at 14:47

## 2019-04-15 RX ADMIN — HYDROMORPHONE HYDROCHLORIDE 1 MG: 1 INJECTION, SOLUTION INTRAMUSCULAR; INTRAVENOUS; SUBCUTANEOUS at 17:00

## 2019-04-15 RX ADMIN — ONDANSETRON 8 MG: 4 TABLET, ORALLY DISINTEGRATING ORAL at 06:46

## 2019-04-15 RX ADMIN — NICOTINE 1 PATCH: 21 PATCH, EXTENDED RELEASE TRANSDERMAL at 11:31

## 2019-04-15 RX ADMIN — ATORVASTATIN CALCIUM 80 MG: 40 TABLET, FILM COATED ORAL at 17:00

## 2019-04-15 RX ADMIN — HYDROMORPHONE HYDROCHLORIDE 1 MG: 1 INJECTION, SOLUTION INTRAMUSCULAR; INTRAVENOUS; SUBCUTANEOUS at 19:11

## 2019-04-15 RX ADMIN — HYDROMORPHONE HYDROCHLORIDE 1 MG: 1 INJECTION, SOLUTION INTRAMUSCULAR; INTRAVENOUS; SUBCUTANEOUS at 04:47

## 2019-04-15 RX ADMIN — SODIUM CHLORIDE, SODIUM LACTATE, POTASSIUM CHLORIDE, AND CALCIUM CHLORIDE 200 ML/HR: .6; .31; .03; .02 INJECTION, SOLUTION INTRAVENOUS at 16:15

## 2019-04-15 RX ADMIN — HYDROMORPHONE HYDROCHLORIDE 1 MG: 1 INJECTION, SOLUTION INTRAMUSCULAR; INTRAVENOUS; SUBCUTANEOUS at 23:25

## 2019-04-15 RX ADMIN — HYDROMORPHONE HYDROCHLORIDE 1 MG: 1 INJECTION, SOLUTION INTRAMUSCULAR; INTRAVENOUS; SUBCUTANEOUS at 09:13

## 2019-04-15 RX ADMIN — ONDANSETRON 8 MG: 4 TABLET, ORALLY DISINTEGRATING ORAL at 14:47

## 2019-04-15 RX ADMIN — SUCRALFATE 1000 MG: 1 SUSPENSION ORAL at 12:20

## 2019-04-15 RX ADMIN — SODIUM CHLORIDE, SODIUM LACTATE, POTASSIUM CHLORIDE, AND CALCIUM CHLORIDE 200 ML/HR: .6; .31; .03; .02 INJECTION, SOLUTION INTRAVENOUS at 23:24

## 2019-04-15 RX ADMIN — HYDROMORPHONE HYDROCHLORIDE 1 MG: 1 INJECTION, SOLUTION INTRAMUSCULAR; INTRAVENOUS; SUBCUTANEOUS at 00:01

## 2019-04-15 RX ADMIN — FAMOTIDINE 20 MG: 20 TABLET ORAL at 21:09

## 2019-04-15 RX ADMIN — PREGABALIN 100 MG: 100 CAPSULE ORAL at 17:00

## 2019-04-15 RX ADMIN — ENOXAPARIN SODIUM 40 MG: 40 INJECTION SUBCUTANEOUS at 09:11

## 2019-04-15 RX ADMIN — SUCRALFATE 1000 MG: 1 SUSPENSION ORAL at 21:08

## 2019-04-15 RX ADMIN — PREGABALIN 100 MG: 100 CAPSULE ORAL at 21:10

## 2019-04-15 RX ADMIN — POLYETHYLENE GLYCOL 3350 17 G: 17 POWDER, FOR SOLUTION ORAL at 12:21

## 2019-04-15 RX ADMIN — FAMOTIDINE 20 MG: 20 TABLET ORAL at 09:11

## 2019-04-16 PROBLEM — I10 HYPERTENSION: Status: ACTIVE | Noted: 2019-04-16

## 2019-04-16 LAB
ALBUMIN SERPL BCP-MCNC: 3.8 G/DL (ref 3.5–5)
ALP SERPL-CCNC: 37 U/L (ref 46–116)
ALT SERPL W P-5'-P-CCNC: 21 U/L (ref 12–78)
ANION GAP SERPL CALCULATED.3IONS-SCNC: 6 MMOL/L (ref 4–13)
AST SERPL W P-5'-P-CCNC: 18 U/L (ref 5–45)
BASOPHILS # BLD AUTO: 0.02 THOUSANDS/ΜL (ref 0–0.1)
BASOPHILS NFR BLD AUTO: 1 % (ref 0–1)
BILIRUB SERPL-MCNC: 0.8 MG/DL (ref 0.2–1)
BUN SERPL-MCNC: 4 MG/DL (ref 5–25)
CALCIUM SERPL-MCNC: 8.9 MG/DL (ref 8.3–10.1)
CHLORIDE SERPL-SCNC: 105 MMOL/L (ref 100–108)
CO2 SERPL-SCNC: 31 MMOL/L (ref 21–32)
CREAT SERPL-MCNC: 0.81 MG/DL (ref 0.6–1.3)
EOSINOPHIL # BLD AUTO: 0.11 THOUSAND/ΜL (ref 0–0.61)
EOSINOPHIL NFR BLD AUTO: 3 % (ref 0–6)
ERYTHROCYTE [DISTWIDTH] IN BLOOD BY AUTOMATED COUNT: 12.5 % (ref 11.6–15.1)
GFR SERPL CREATININE-BSD FRML MDRD: 107 ML/MIN/1.73SQ M
GLUCOSE SERPL-MCNC: 114 MG/DL (ref 65–140)
HCT VFR BLD AUTO: 35.3 % (ref 36.5–49.3)
HGB BLD-MCNC: 11.7 G/DL (ref 12–17)
IMM GRANULOCYTES # BLD AUTO: 0.01 THOUSAND/UL (ref 0–0.2)
IMM GRANULOCYTES NFR BLD AUTO: 0 % (ref 0–2)
LIPASE SERPL-CCNC: 61 U/L (ref 73–393)
LYMPHOCYTES # BLD AUTO: 1.32 THOUSANDS/ΜL (ref 0.6–4.47)
LYMPHOCYTES NFR BLD AUTO: 31 % (ref 14–44)
MCH RBC QN AUTO: 31.9 PG (ref 26.8–34.3)
MCHC RBC AUTO-ENTMCNC: 33.1 G/DL (ref 31.4–37.4)
MCV RBC AUTO: 96 FL (ref 82–98)
MONOCYTES # BLD AUTO: 0.46 THOUSAND/ΜL (ref 0.17–1.22)
MONOCYTES NFR BLD AUTO: 11 % (ref 4–12)
NEUTROPHILS # BLD AUTO: 2.3 THOUSANDS/ΜL (ref 1.85–7.62)
NEUTS SEG NFR BLD AUTO: 54 % (ref 43–75)
NRBC BLD AUTO-RTO: 0 /100 WBCS
PLATELET # BLD AUTO: 128 THOUSANDS/UL (ref 149–390)
PMV BLD AUTO: 11.2 FL (ref 8.9–12.7)
POTASSIUM SERPL-SCNC: 3.8 MMOL/L (ref 3.5–5.3)
PROT SERPL-MCNC: 6 G/DL (ref 6.4–8.2)
RBC # BLD AUTO: 3.67 MILLION/UL (ref 3.88–5.62)
SODIUM SERPL-SCNC: 142 MMOL/L (ref 136–145)
TRIGL SERPL-MCNC: 140 MG/DL
WBC # BLD AUTO: 4.22 THOUSAND/UL (ref 4.31–10.16)

## 2019-04-16 PROCEDURE — 99232 SBSQ HOSP IP/OBS MODERATE 35: CPT | Performed by: INTERNAL MEDICINE

## 2019-04-16 PROCEDURE — 84478 ASSAY OF TRIGLYCERIDES: CPT | Performed by: PHYSICIAN ASSISTANT

## 2019-04-16 PROCEDURE — 83690 ASSAY OF LIPASE: CPT | Performed by: PHYSICIAN ASSISTANT

## 2019-04-16 PROCEDURE — 99232 SBSQ HOSP IP/OBS MODERATE 35: CPT | Performed by: PHYSICIAN ASSISTANT

## 2019-04-16 PROCEDURE — 85025 COMPLETE CBC W/AUTO DIFF WBC: CPT | Performed by: PHYSICIAN ASSISTANT

## 2019-04-16 PROCEDURE — 80053 COMPREHEN METABOLIC PANEL: CPT | Performed by: PHYSICIAN ASSISTANT

## 2019-04-16 RX ORDER — HYDROMORPHONE HCL 110MG/55ML
2 PATIENT CONTROLLED ANALGESIA SYRINGE INTRAVENOUS EVERY 4 HOURS PRN
Status: DISCONTINUED | OUTPATIENT
Start: 2019-04-16 | End: 2019-04-16

## 2019-04-16 RX ORDER — OXYCODONE HCL 10 MG/1
10 TABLET, FILM COATED, EXTENDED RELEASE ORAL EVERY 12 HOURS SCHEDULED
Status: DISCONTINUED | OUTPATIENT
Start: 2019-04-16 | End: 2019-04-17 | Stop reason: HOSPADM

## 2019-04-16 RX ORDER — HYDRALAZINE HYDROCHLORIDE 20 MG/ML
5 INJECTION INTRAMUSCULAR; INTRAVENOUS EVERY 6 HOURS PRN
Status: DISCONTINUED | OUTPATIENT
Start: 2019-04-16 | End: 2019-04-17 | Stop reason: HOSPADM

## 2019-04-16 RX ORDER — HYDROMORPHONE HCL 110MG/55ML
2 PATIENT CONTROLLED ANALGESIA SYRINGE INTRAVENOUS EVERY 4 HOURS PRN
Status: DISCONTINUED | OUTPATIENT
Start: 2019-04-16 | End: 2019-04-17 | Stop reason: HOSPADM

## 2019-04-16 RX ORDER — HYDRALAZINE HYDROCHLORIDE 20 MG/ML
5 INJECTION INTRAMUSCULAR; INTRAVENOUS EVERY 6 HOURS PRN
Status: DISCONTINUED | OUTPATIENT
Start: 2019-04-16 | End: 2019-04-16

## 2019-04-16 RX ORDER — OXYCODONE HYDROCHLORIDE 5 MG/1
5 TABLET ORAL EVERY 4 HOURS PRN
Status: DISCONTINUED | OUTPATIENT
Start: 2019-04-16 | End: 2019-04-17 | Stop reason: HOSPADM

## 2019-04-16 RX ADMIN — HYDROMORPHONE HYDROCHLORIDE 1 MG: 1 INJECTION, SOLUTION INTRAMUSCULAR; INTRAVENOUS; SUBCUTANEOUS at 07:57

## 2019-04-16 RX ADMIN — NIACIN 1500 MG: 500 TABLET, FILM COATED, EXTENDED RELEASE ORAL at 21:16

## 2019-04-16 RX ADMIN — OXYCODONE HYDROCHLORIDE 5 MG: 5 TABLET ORAL at 19:54

## 2019-04-16 RX ADMIN — TRAZODONE HYDROCHLORIDE 50 MG: 50 TABLET ORAL at 21:16

## 2019-04-16 RX ADMIN — PREGABALIN 100 MG: 100 CAPSULE ORAL at 19:56

## 2019-04-16 RX ADMIN — PREGABALIN 100 MG: 100 CAPSULE ORAL at 08:08

## 2019-04-16 RX ADMIN — SUCRALFATE 1000 MG: 1 SUSPENSION ORAL at 21:15

## 2019-04-16 RX ADMIN — PANCRELIPASE 36000 UNITS: 24000; 76000; 120000 CAPSULE, DELAYED RELEASE PELLETS ORAL at 08:07

## 2019-04-16 RX ADMIN — FAMOTIDINE 20 MG: 20 TABLET ORAL at 08:05

## 2019-04-16 RX ADMIN — HYDROMORPHONE HYDROCHLORIDE 2 MG: 2 INJECTION INTRAMUSCULAR; INTRAVENOUS; SUBCUTANEOUS at 18:29

## 2019-04-16 RX ADMIN — PANTOPRAZOLE SODIUM 40 MG: 40 TABLET, DELAYED RELEASE ORAL at 19:55

## 2019-04-16 RX ADMIN — HYDROMORPHONE HYDROCHLORIDE 1 MG: 1 INJECTION, SOLUTION INTRAMUSCULAR; INTRAVENOUS; SUBCUTANEOUS at 02:31

## 2019-04-16 RX ADMIN — SUCRALFATE 1000 MG: 1 SUSPENSION ORAL at 19:55

## 2019-04-16 RX ADMIN — PANCRELIPASE 36000 UNITS: 24000; 76000; 120000 CAPSULE, DELAYED RELEASE PELLETS ORAL at 19:57

## 2019-04-16 RX ADMIN — POLYETHYLENE GLYCOL 3350 17 G: 17 POWDER, FOR SOLUTION ORAL at 08:08

## 2019-04-16 RX ADMIN — ATORVASTATIN CALCIUM 80 MG: 40 TABLET, FILM COATED ORAL at 19:55

## 2019-04-16 RX ADMIN — HYDROMORPHONE HYDROCHLORIDE 1 MG: 1 INJECTION, SOLUTION INTRAMUSCULAR; INTRAVENOUS; SUBCUTANEOUS at 14:34

## 2019-04-16 RX ADMIN — ENOXAPARIN SODIUM 40 MG: 40 INJECTION SUBCUTANEOUS at 08:06

## 2019-04-16 RX ADMIN — ONDANSETRON 8 MG: 4 TABLET, ORALLY DISINTEGRATING ORAL at 14:34

## 2019-04-16 RX ADMIN — PANCRELIPASE 36000 UNITS: 24000; 76000; 120000 CAPSULE, DELAYED RELEASE PELLETS ORAL at 12:23

## 2019-04-16 RX ADMIN — ONDANSETRON 8 MG: 4 TABLET, ORALLY DISINTEGRATING ORAL at 05:39

## 2019-04-16 RX ADMIN — HYDROMORPHONE HYDROCHLORIDE 1 MG: 1 INJECTION, SOLUTION INTRAMUSCULAR; INTRAVENOUS; SUBCUTANEOUS at 12:22

## 2019-04-16 RX ADMIN — HYDROMORPHONE HYDROCHLORIDE 1 MG: 1 INJECTION, SOLUTION INTRAMUSCULAR; INTRAVENOUS; SUBCUTANEOUS at 05:36

## 2019-04-16 RX ADMIN — Medication 2000 MG: at 19:56

## 2019-04-16 RX ADMIN — ONDANSETRON 8 MG: 4 TABLET, ORALLY DISINTEGRATING ORAL at 21:15

## 2019-04-16 RX ADMIN — SODIUM CHLORIDE, SODIUM LACTATE, POTASSIUM CHLORIDE, AND CALCIUM CHLORIDE 200 ML/HR: .6; .31; .03; .02 INJECTION, SOLUTION INTRAVENOUS at 04:32

## 2019-04-16 RX ADMIN — SUCRALFATE 1000 MG: 1 SUSPENSION ORAL at 06:14

## 2019-04-16 RX ADMIN — Medication 2000 MG: at 08:04

## 2019-04-16 RX ADMIN — PANTOPRAZOLE SODIUM 40 MG: 40 TABLET, DELAYED RELEASE ORAL at 06:14

## 2019-04-16 RX ADMIN — PREGABALIN 100 MG: 100 CAPSULE ORAL at 21:16

## 2019-04-16 RX ADMIN — SUCRALFATE 1000 MG: 1 SUSPENSION ORAL at 12:22

## 2019-04-16 RX ADMIN — SODIUM CHLORIDE, SODIUM LACTATE, POTASSIUM CHLORIDE, AND CALCIUM CHLORIDE 200 ML/HR: .6; .31; .03; .02 INJECTION, SOLUTION INTRAVENOUS at 10:25

## 2019-04-16 RX ADMIN — NICOTINE 1 PATCH: 21 PATCH, EXTENDED RELEASE TRANSDERMAL at 08:05

## 2019-04-16 RX ADMIN — FAMOTIDINE 20 MG: 20 TABLET ORAL at 19:56

## 2019-04-16 RX ADMIN — FENOFIBRATE 145 MG: 145 TABLET, COATED ORAL at 08:04

## 2019-04-16 RX ADMIN — HYDROMORPHONE HYDROCHLORIDE 1 MG: 1 INJECTION, SOLUTION INTRAMUSCULAR; INTRAVENOUS; SUBCUTANEOUS at 10:25

## 2019-04-16 RX ADMIN — OXYCODONE HYDROCHLORIDE 10 MG: 10 TABLET, FILM COATED, EXTENDED RELEASE ORAL at 21:16

## 2019-04-17 VITALS
HEART RATE: 62 BPM | OXYGEN SATURATION: 90 % | RESPIRATION RATE: 18 BRPM | WEIGHT: 215.61 LBS | TEMPERATURE: 98.3 F | BODY MASS INDEX: 30.19 KG/M2 | DIASTOLIC BLOOD PRESSURE: 88 MMHG | HEIGHT: 71 IN | SYSTOLIC BLOOD PRESSURE: 139 MMHG

## 2019-04-17 LAB
ALBUMIN SERPL BCP-MCNC: 4 G/DL (ref 3.5–5)
ALP SERPL-CCNC: 65 U/L (ref 46–116)
ALT SERPL W P-5'-P-CCNC: 25 U/L (ref 12–78)
ANION GAP SERPL CALCULATED.3IONS-SCNC: 10 MMOL/L (ref 4–13)
AST SERPL W P-5'-P-CCNC: 19 U/L (ref 5–45)
BASOPHILS # BLD AUTO: 0.02 THOUSANDS/ΜL (ref 0–0.1)
BASOPHILS NFR BLD AUTO: 0 % (ref 0–1)
BILIRUB SERPL-MCNC: 0.6 MG/DL (ref 0.2–1)
BUN SERPL-MCNC: 8 MG/DL (ref 5–25)
CALCIUM SERPL-MCNC: 9.4 MG/DL (ref 8.3–10.1)
CHLORIDE SERPL-SCNC: 105 MMOL/L (ref 100–108)
CO2 SERPL-SCNC: 27 MMOL/L (ref 21–32)
CREAT SERPL-MCNC: 0.95 MG/DL (ref 0.6–1.3)
EOSINOPHIL # BLD AUTO: 0.12 THOUSAND/ΜL (ref 0–0.61)
EOSINOPHIL NFR BLD AUTO: 3 % (ref 0–6)
ERYTHROCYTE [DISTWIDTH] IN BLOOD BY AUTOMATED COUNT: 12.3 % (ref 11.6–15.1)
GFR SERPL CREATININE-BSD FRML MDRD: 96 ML/MIN/1.73SQ M
GLUCOSE SERPL-MCNC: 139 MG/DL (ref 65–140)
HCT VFR BLD AUTO: 37.6 % (ref 36.5–49.3)
HGB BLD-MCNC: 12.6 G/DL (ref 12–17)
IMM GRANULOCYTES # BLD AUTO: 0.01 THOUSAND/UL (ref 0–0.2)
IMM GRANULOCYTES NFR BLD AUTO: 0 % (ref 0–2)
LIPASE SERPL-CCNC: 86 U/L (ref 73–393)
LYMPHOCYTES # BLD AUTO: 1.45 THOUSANDS/ΜL (ref 0.6–4.47)
LYMPHOCYTES NFR BLD AUTO: 32 % (ref 14–44)
MCH RBC QN AUTO: 32.1 PG (ref 26.8–34.3)
MCHC RBC AUTO-ENTMCNC: 33.5 G/DL (ref 31.4–37.4)
MCV RBC AUTO: 96 FL (ref 82–98)
MONOCYTES # BLD AUTO: 0.5 THOUSAND/ΜL (ref 0.17–1.22)
MONOCYTES NFR BLD AUTO: 11 % (ref 4–12)
NEUTROPHILS # BLD AUTO: 2.38 THOUSANDS/ΜL (ref 1.85–7.62)
NEUTS SEG NFR BLD AUTO: 54 % (ref 43–75)
NRBC BLD AUTO-RTO: 0 /100 WBCS
PLATELET # BLD AUTO: 145 THOUSANDS/UL (ref 149–390)
PMV BLD AUTO: 10.9 FL (ref 8.9–12.7)
POTASSIUM SERPL-SCNC: 3.7 MMOL/L (ref 3.5–5.3)
PROT SERPL-MCNC: 6.6 G/DL (ref 6.4–8.2)
RBC # BLD AUTO: 3.93 MILLION/UL (ref 3.88–5.62)
SODIUM SERPL-SCNC: 142 MMOL/L (ref 136–145)
WBC # BLD AUTO: 4.48 THOUSAND/UL (ref 4.31–10.16)

## 2019-04-17 PROCEDURE — 85025 COMPLETE CBC W/AUTO DIFF WBC: CPT | Performed by: PHYSICIAN ASSISTANT

## 2019-04-17 PROCEDURE — 80053 COMPREHEN METABOLIC PANEL: CPT | Performed by: PHYSICIAN ASSISTANT

## 2019-04-17 PROCEDURE — 99238 HOSP IP/OBS DSCHRG MGMT 30/<: CPT | Performed by: PHYSICIAN ASSISTANT

## 2019-04-17 PROCEDURE — 83690 ASSAY OF LIPASE: CPT | Performed by: PHYSICIAN ASSISTANT

## 2019-04-17 RX ORDER — NIACIN 500 MG/1
1500 TABLET, EXTENDED RELEASE ORAL
Qty: 90 TABLET | Refills: 0 | Status: SHIPPED | OUTPATIENT
Start: 2019-04-17 | End: 2019-05-06 | Stop reason: SDUPTHER

## 2019-04-17 RX ORDER — OXYCODONE HCL 10 MG/1
10 TABLET, FILM COATED, EXTENDED RELEASE ORAL EVERY 12 HOURS SCHEDULED
Qty: 10 TABLET | Refills: 0 | Status: ON HOLD | OUTPATIENT
Start: 2019-04-17 | End: 2019-09-09 | Stop reason: SDUPTHER

## 2019-04-17 RX ORDER — OXYCODONE HYDROCHLORIDE 5 MG/1
5 TABLET ORAL EVERY 4 HOURS PRN
Qty: 20 TABLET | Refills: 0 | Status: ON HOLD | OUTPATIENT
Start: 2019-04-17 | End: 2019-09-09 | Stop reason: SDUPTHER

## 2019-04-17 RX ADMIN — OXYCODONE HYDROCHLORIDE 10 MG: 10 TABLET, FILM COATED, EXTENDED RELEASE ORAL at 08:49

## 2019-04-17 RX ADMIN — ENOXAPARIN SODIUM 40 MG: 40 INJECTION SUBCUTANEOUS at 08:50

## 2019-04-17 RX ADMIN — SUCRALFATE 1000 MG: 1 SUSPENSION ORAL at 12:37

## 2019-04-17 RX ADMIN — POLYETHYLENE GLYCOL 3350 17 G: 17 POWDER, FOR SOLUTION ORAL at 08:50

## 2019-04-17 RX ADMIN — PREGABALIN 100 MG: 100 CAPSULE ORAL at 08:49

## 2019-04-17 RX ADMIN — Medication 2000 MG: at 08:49

## 2019-04-17 RX ADMIN — ONDANSETRON 8 MG: 4 TABLET, ORALLY DISINTEGRATING ORAL at 05:31

## 2019-04-17 RX ADMIN — PANTOPRAZOLE SODIUM 40 MG: 40 TABLET, DELAYED RELEASE ORAL at 05:31

## 2019-04-17 RX ADMIN — SUCRALFATE 1000 MG: 1 SUSPENSION ORAL at 05:32

## 2019-04-17 RX ADMIN — PANCRELIPASE 36000 UNITS: 24000; 76000; 120000 CAPSULE, DELAYED RELEASE PELLETS ORAL at 12:37

## 2019-04-17 RX ADMIN — FAMOTIDINE 20 MG: 20 TABLET ORAL at 08:49

## 2019-04-17 RX ADMIN — NICOTINE 1 PATCH: 21 PATCH, EXTENDED RELEASE TRANSDERMAL at 08:53

## 2019-04-17 RX ADMIN — FENOFIBRATE 145 MG: 145 TABLET, COATED ORAL at 08:49

## 2019-04-17 RX ADMIN — PANCRELIPASE 36000 UNITS: 24000; 76000; 120000 CAPSULE, DELAYED RELEASE PELLETS ORAL at 08:50

## 2019-04-18 ENCOUNTER — TRANSITIONAL CARE MANAGEMENT (OUTPATIENT)
Dept: FAMILY MEDICINE CLINIC | Facility: CLINIC | Age: 44
End: 2019-04-18

## 2019-04-18 ENCOUNTER — ANESTHESIA EVENT (OUTPATIENT)
Dept: PERIOP | Facility: HOSPITAL | Age: 44
End: 2019-04-18
Payer: COMMERCIAL

## 2019-04-18 ENCOUNTER — HOSPITAL ENCOUNTER (OUTPATIENT)
Facility: HOSPITAL | Age: 44
Setting detail: OUTPATIENT SURGERY
Discharge: HOME | End: 2019-04-18
Attending: ANESTHESIOLOGY | Admitting: ANESTHESIOLOGY
Payer: COMMERCIAL

## 2019-04-18 ENCOUNTER — ANESTHESIA (OUTPATIENT)
Dept: PERIOP | Facility: HOSPITAL | Age: 44
End: 2019-04-18
Payer: COMMERCIAL

## 2019-04-18 ENCOUNTER — APPOINTMENT (OUTPATIENT)
Dept: RADIOLOGY | Facility: HOSPITAL | Age: 44
End: 2019-04-18
Payer: COMMERCIAL

## 2019-04-18 VITALS
WEIGHT: 206 LBS | DIASTOLIC BLOOD PRESSURE: 68 MMHG | BODY MASS INDEX: 28.84 KG/M2 | RESPIRATION RATE: 29 BRPM | HEART RATE: 69 BPM | SYSTOLIC BLOOD PRESSURE: 111 MMHG | TEMPERATURE: 97.8 F | HEIGHT: 71 IN | OXYGEN SATURATION: 95 %

## 2019-04-18 PROCEDURE — 64530 N BLOCK INJ CELIAC PELUS: CPT | Performed by: ANESTHESIOLOGY

## 2019-04-18 PROCEDURE — 76000 FLUOROSCOPY <1 HR PHYS/QHP: CPT

## 2019-04-18 RX ORDER — HYDROMORPHONE HCL/PF 1 MG/ML
0.2 SYRINGE (ML) INJECTION
Status: DISCONTINUED | OUTPATIENT
Start: 2019-04-18 | End: 2019-04-18 | Stop reason: HOSPADM

## 2019-04-18 RX ORDER — ONDANSETRON 2 MG/ML
4 INJECTION INTRAMUSCULAR; INTRAVENOUS ONCE AS NEEDED
Status: DISCONTINUED | OUTPATIENT
Start: 2019-04-18 | End: 2019-04-18 | Stop reason: HOSPADM

## 2019-04-18 RX ORDER — DEXAMETHASONE SODIUM PHOSPHATE 10 MG/ML
INJECTION, SOLUTION INTRAMUSCULAR; INTRAVENOUS AS NEEDED
Status: DISCONTINUED | OUTPATIENT
Start: 2019-04-18 | End: 2019-04-18 | Stop reason: HOSPADM

## 2019-04-18 RX ORDER — FENTANYL CITRATE 50 UG/ML
INJECTION, SOLUTION INTRAMUSCULAR; INTRAVENOUS AS NEEDED
Status: DISCONTINUED | OUTPATIENT
Start: 2019-04-18 | End: 2019-04-18 | Stop reason: SURG

## 2019-04-18 RX ORDER — BUPIVACAINE HYDROCHLORIDE 2.5 MG/ML
INJECTION, SOLUTION INFILTRATION; PERINEURAL AS NEEDED
Status: DISCONTINUED | OUTPATIENT
Start: 2019-04-18 | End: 2019-04-18 | Stop reason: HOSPADM

## 2019-04-18 RX ORDER — 0.9 % SODIUM CHLORIDE 0.9 %
VIAL (ML) INJECTION AS NEEDED
Status: DISCONTINUED | OUTPATIENT
Start: 2019-04-18 | End: 2019-04-18 | Stop reason: HOSPADM

## 2019-04-18 RX ORDER — MIDAZOLAM HYDROCHLORIDE 1 MG/ML
INJECTION INTRAMUSCULAR; INTRAVENOUS AS NEEDED
Status: DISCONTINUED | OUTPATIENT
Start: 2019-04-18 | End: 2019-04-18 | Stop reason: SURG

## 2019-04-18 RX ORDER — FENTANYL CITRATE/PF 50 MCG/ML
25 SYRINGE (ML) INJECTION
Status: DISCONTINUED | OUTPATIENT
Start: 2019-04-18 | End: 2019-04-18 | Stop reason: HOSPADM

## 2019-04-18 RX ORDER — PROPOFOL 10 MG/ML
INJECTION, EMULSION INTRAVENOUS AS NEEDED
Status: DISCONTINUED | OUTPATIENT
Start: 2019-04-18 | End: 2019-04-18 | Stop reason: SURG

## 2019-04-18 RX ORDER — PROPOFOL 10 MG/ML
INJECTION, EMULSION INTRAVENOUS CONTINUOUS PRN
Status: DISCONTINUED | OUTPATIENT
Start: 2019-04-18 | End: 2019-04-18 | Stop reason: SURG

## 2019-04-18 RX ORDER — SODIUM CHLORIDE, SODIUM LACTATE, POTASSIUM CHLORIDE, CALCIUM CHLORIDE 600; 310; 30; 20 MG/100ML; MG/100ML; MG/100ML; MG/100ML
INJECTION, SOLUTION INTRAVENOUS CONTINUOUS PRN
Status: DISCONTINUED | OUTPATIENT
Start: 2019-04-18 | End: 2019-04-18 | Stop reason: SURG

## 2019-04-18 RX ORDER — LIDOCAINE HYDROCHLORIDE 20 MG/ML
INJECTION, SOLUTION INFILTRATION; PERINEURAL AS NEEDED
Status: DISCONTINUED | OUTPATIENT
Start: 2019-04-18 | End: 2019-04-18 | Stop reason: HOSPADM

## 2019-04-18 RX ADMIN — PROPOFOL 20 MG: 10 INJECTION, EMULSION INTRAVENOUS at 14:29

## 2019-04-18 RX ADMIN — HYDROMORPHONE HYDROCHLORIDE 0.2 MG: 1 INJECTION, SOLUTION INTRAMUSCULAR; INTRAVENOUS; SUBCUTANEOUS at 15:25

## 2019-04-18 RX ADMIN — HYDROMORPHONE HYDROCHLORIDE 0.2 MG: 1 INJECTION, SOLUTION INTRAMUSCULAR; INTRAVENOUS; SUBCUTANEOUS at 15:20

## 2019-04-18 RX ADMIN — SODIUM CHLORIDE, SODIUM LACTATE, POTASSIUM CHLORIDE, AND CALCIUM CHLORIDE: .6; .31; .03; .02 INJECTION, SOLUTION INTRAVENOUS at 14:00

## 2019-04-18 RX ADMIN — HYDROMORPHONE HYDROCHLORIDE 0.2 MG: 1 INJECTION, SOLUTION INTRAMUSCULAR; INTRAVENOUS; SUBCUTANEOUS at 15:40

## 2019-04-18 RX ADMIN — FENTANYL CITRATE 25 MCG: 50 INJECTION INTRAMUSCULAR; INTRAVENOUS at 15:09

## 2019-04-18 RX ADMIN — HYDROMORPHONE HYDROCHLORIDE 0.2 MG: 1 INJECTION, SOLUTION INTRAMUSCULAR; INTRAVENOUS; SUBCUTANEOUS at 15:50

## 2019-04-18 RX ADMIN — PROPOFOL 20 MG: 10 INJECTION, EMULSION INTRAVENOUS at 14:15

## 2019-04-18 RX ADMIN — MIDAZOLAM HYDROCHLORIDE 2 MG: 1 INJECTION, SOLUTION INTRAMUSCULAR; INTRAVENOUS at 14:04

## 2019-04-18 RX ADMIN — HYDROMORPHONE HYDROCHLORIDE 0.2 MG: 1 INJECTION, SOLUTION INTRAMUSCULAR; INTRAVENOUS; SUBCUTANEOUS at 15:35

## 2019-04-18 RX ADMIN — FENTANYL CITRATE 25 MCG: 50 INJECTION INTRAMUSCULAR; INTRAVENOUS at 15:02

## 2019-04-18 RX ADMIN — PROPOFOL 120 MCG/KG/MIN: 10 INJECTION, EMULSION INTRAVENOUS at 14:11

## 2019-04-18 RX ADMIN — FENTANYL CITRATE 100 MCG: 50 INJECTION, SOLUTION INTRAMUSCULAR; INTRAVENOUS at 14:11

## 2019-04-18 RX ADMIN — HYDROMORPHONE HYDROCHLORIDE 0.2 MG: 1 INJECTION, SOLUTION INTRAMUSCULAR; INTRAVENOUS; SUBCUTANEOUS at 15:30

## 2019-04-18 RX ADMIN — PROPOFOL 30 MG: 10 INJECTION, EMULSION INTRAVENOUS at 14:13

## 2019-04-18 RX ADMIN — HYDROMORPHONE HYDROCHLORIDE 0.2 MG: 1 INJECTION, SOLUTION INTRAMUSCULAR; INTRAVENOUS; SUBCUTANEOUS at 15:45

## 2019-04-18 RX ADMIN — HYDROMORPHONE HYDROCHLORIDE 0.2 MG: 1 INJECTION, SOLUTION INTRAMUSCULAR; INTRAVENOUS; SUBCUTANEOUS at 15:15

## 2019-04-19 ENCOUNTER — TELEPHONE (OUTPATIENT)
Dept: PAIN MEDICINE | Facility: MEDICAL CENTER | Age: 44
End: 2019-04-19

## 2019-04-25 ENCOUNTER — OFFICE VISIT (OUTPATIENT)
Dept: FAMILY MEDICINE CLINIC | Facility: CLINIC | Age: 44
End: 2019-04-25
Payer: COMMERCIAL

## 2019-04-25 VITALS
TEMPERATURE: 97.7 F | OXYGEN SATURATION: 96 % | SYSTOLIC BLOOD PRESSURE: 130 MMHG | RESPIRATION RATE: 16 BRPM | HEIGHT: 71 IN | BODY MASS INDEX: 28.84 KG/M2 | HEART RATE: 86 BPM | WEIGHT: 206 LBS | DIASTOLIC BLOOD PRESSURE: 80 MMHG

## 2019-04-25 DIAGNOSIS — K86.1 ACUTE ON CHRONIC PANCREATITIS (HCC): ICD-10-CM

## 2019-04-25 DIAGNOSIS — K85.90 ACUTE ON CHRONIC PANCREATITIS (HCC): ICD-10-CM

## 2019-04-25 DIAGNOSIS — E78.1 HYPERTRIGLYCERIDEMIA: Primary | ICD-10-CM

## 2019-04-25 PROCEDURE — 99495 TRANSJ CARE MGMT MOD F2F 14D: CPT | Performed by: FAMILY MEDICINE

## 2019-05-06 DIAGNOSIS — E78.1 HYPERTRIGLYCERIDEMIA: ICD-10-CM

## 2019-05-06 DIAGNOSIS — G89.4 CHRONIC PAIN SYNDROME: ICD-10-CM

## 2019-05-06 DIAGNOSIS — E78.2 MIXED HYPERLIPIDEMIA: ICD-10-CM

## 2019-05-06 DIAGNOSIS — Z71.6 ENCOUNTER FOR SMOKING CESSATION COUNSELING: ICD-10-CM

## 2019-05-06 DIAGNOSIS — K21.9 GASTROESOPHAGEAL REFLUX DISEASE WITHOUT ESOPHAGITIS: ICD-10-CM

## 2019-05-06 RX ORDER — ROSUVASTATIN CALCIUM 40 MG/1
40 TABLET, COATED ORAL DAILY
Qty: 90 TABLET | Refills: 1 | Status: SHIPPED | OUTPATIENT
Start: 2019-05-06 | End: 2019-09-10 | Stop reason: SDUPTHER

## 2019-05-06 RX ORDER — NIACIN 500 MG/1
1500 TABLET, EXTENDED RELEASE ORAL
Qty: 360 TABLET | Refills: 3 | Status: SHIPPED | OUTPATIENT
Start: 2019-05-06 | End: 2019-09-20

## 2019-05-06 RX ORDER — OMEGA-3-ACID ETHYL ESTERS 1 G/1
2 CAPSULE, LIQUID FILLED ORAL 2 TIMES DAILY
Qty: 360 CAPSULE | Refills: 3 | Status: SHIPPED | OUTPATIENT
Start: 2019-05-06 | End: 2019-09-10 | Stop reason: SDUPTHER

## 2019-05-06 RX ORDER — FENOFIBRIC ACID 135 MG/1
1 CAPSULE, DELAYED RELEASE ORAL DAILY
Qty: 90 CAPSULE | Refills: 3 | Status: SHIPPED | OUTPATIENT
Start: 2019-05-06 | End: 2019-05-06 | Stop reason: SDUPTHER

## 2019-05-06 RX ORDER — PANTOPRAZOLE SODIUM 40 MG/1
40 TABLET, DELAYED RELEASE ORAL 2 TIMES DAILY
Qty: 180 TABLET | Refills: 3 | Status: SHIPPED | OUTPATIENT
Start: 2019-05-06 | End: 2019-08-06 | Stop reason: SDUPTHER

## 2019-05-06 RX ORDER — NIACIN 500 MG/1
1500 TABLET, EXTENDED RELEASE ORAL
Qty: 360 TABLET | Refills: 3 | Status: SHIPPED | OUTPATIENT
Start: 2019-05-06 | End: 2019-05-06 | Stop reason: SDUPTHER

## 2019-05-06 RX ORDER — FENOFIBRIC ACID 135 MG/1
1 CAPSULE, DELAYED RELEASE ORAL DAILY
Qty: 90 CAPSULE | Refills: 3 | Status: SHIPPED | OUTPATIENT
Start: 2019-05-06 | End: 2019-11-14 | Stop reason: SDUPTHER

## 2019-05-06 RX ORDER — PREGABALIN 100 MG/1
100 CAPSULE ORAL 3 TIMES DAILY
Qty: 90 CAPSULE | Refills: 0 | Status: SHIPPED | OUTPATIENT
Start: 2019-05-06 | End: 2019-06-25 | Stop reason: SDUPTHER

## 2019-05-06 RX ORDER — OMEGA-3-ACID ETHYL ESTERS 1 G/1
2 CAPSULE, LIQUID FILLED ORAL 2 TIMES DAILY
Qty: 360 CAPSULE | Refills: 3 | Status: SHIPPED | OUTPATIENT
Start: 2019-05-06 | End: 2019-05-06 | Stop reason: SDUPTHER

## 2019-05-22 DIAGNOSIS — E78.1 FAMILIAL HYPERTRIGLYCERIDEMIA: Primary | ICD-10-CM

## 2019-05-22 LAB
ALBUMIN SERPL-MCNC: 4.4 G/DL (ref 3.6–5.1)
ALBUMIN/GLOB SERPL: 1.9 (CALC) (ref 1–2.5)
ALP SERPL-CCNC: 78 U/L (ref 40–115)
ALT SERPL-CCNC: 28 U/L (ref 9–46)
AST SERPL-CCNC: 20 U/L (ref 10–40)
BILIRUB SERPL-MCNC: 0.7 MG/DL (ref 0.2–1.2)
BUN SERPL-MCNC: 13 MG/DL (ref 7–25)
BUN/CREAT SERPL: ABNORMAL (CALC) (ref 6–22)
CALCIUM SERPL-MCNC: 9.6 MG/DL (ref 8.6–10.3)
CHLORIDE SERPL-SCNC: 105 MMOL/L (ref 98–110)
CO2 SERPL-SCNC: 28 MMOL/L (ref 20–32)
CREAT SERPL-MCNC: 0.78 MG/DL (ref 0.6–1.35)
GLOBULIN SER CALC-MCNC: 2.3 G/DL (CALC) (ref 1.9–3.7)
GLUCOSE SERPL-MCNC: 137 MG/DL (ref 65–99)
POTASSIUM SERPL-SCNC: 4.3 MMOL/L (ref 3.5–5.3)
PROT SERPL-MCNC: 6.7 G/DL (ref 6.1–8.1)
SL AMB EGFR AFRICAN AMERICAN: 125 ML/MIN/1.73M2
SL AMB EGFR NON AFRICAN AMERICAN: 108 ML/MIN/1.73M2
SODIUM SERPL-SCNC: 140 MMOL/L (ref 135–146)
TRIGL SERPL-MCNC: 695 MG/DL

## 2019-05-23 ENCOUNTER — TELEPHONE (OUTPATIENT)
Dept: GASTROENTEROLOGY | Facility: CLINIC | Age: 44
End: 2019-05-23

## 2019-05-29 ENCOUNTER — OFFICE VISIT (OUTPATIENT)
Dept: PAIN MEDICINE | Facility: CLINIC | Age: 44
End: 2019-05-29
Payer: COMMERCIAL

## 2019-05-29 VITALS
WEIGHT: 206 LBS | HEART RATE: 92 BPM | RESPIRATION RATE: 16 BRPM | HEIGHT: 71 IN | SYSTOLIC BLOOD PRESSURE: 126 MMHG | BODY MASS INDEX: 28.84 KG/M2 | DIASTOLIC BLOOD PRESSURE: 84 MMHG

## 2019-05-29 DIAGNOSIS — G89.4 CHRONIC PAIN SYNDROME: ICD-10-CM

## 2019-05-29 DIAGNOSIS — K86.1 ACUTE ON CHRONIC PANCREATITIS (HCC): ICD-10-CM

## 2019-05-29 DIAGNOSIS — K86.1 OTHER CHRONIC PANCREATITIS (HCC): Primary | ICD-10-CM

## 2019-05-29 DIAGNOSIS — K85.90 ACUTE ON CHRONIC PANCREATITIS (HCC): ICD-10-CM

## 2019-05-29 PROCEDURE — 99213 OFFICE O/P EST LOW 20 MIN: CPT | Performed by: ANESTHESIOLOGY

## 2019-05-30 ENCOUNTER — TELEPHONE (OUTPATIENT)
Dept: GASTROENTEROLOGY | Facility: CLINIC | Age: 44
End: 2019-05-30

## 2019-06-03 ENCOUNTER — TELEPHONE (OUTPATIENT)
Dept: GASTROENTEROLOGY | Facility: CLINIC | Age: 44
End: 2019-06-03

## 2019-06-04 DIAGNOSIS — G47.00 INSOMNIA, UNSPECIFIED TYPE: ICD-10-CM

## 2019-06-05 RX ORDER — ONDANSETRON 4 MG/1
TABLET, ORALLY DISINTEGRATING ORAL
Qty: 30 TABLET | Refills: 0 | Status: SHIPPED | OUTPATIENT
Start: 2019-06-05 | End: 2019-08-06 | Stop reason: SDUPTHER

## 2019-06-21 ENCOUNTER — TELEPHONE (OUTPATIENT)
Dept: GASTROENTEROLOGY | Facility: CLINIC | Age: 44
End: 2019-06-21

## 2019-06-21 LAB — TRIGL SERPL-MCNC: 275 MG/DL

## 2019-06-25 DIAGNOSIS — G89.4 CHRONIC PAIN SYNDROME: ICD-10-CM

## 2019-06-25 RX ORDER — PREGABALIN 100 MG/1
100 CAPSULE ORAL 3 TIMES DAILY
Qty: 90 CAPSULE | Refills: 1 | Status: SHIPPED | OUTPATIENT
Start: 2019-06-25 | End: 2019-09-20 | Stop reason: SDUPTHER

## 2019-07-15 ENCOUNTER — TELEPHONE (OUTPATIENT)
Dept: GASTROENTEROLOGY | Facility: CLINIC | Age: 44
End: 2019-07-15

## 2019-07-15 DIAGNOSIS — E78.1 HYPERTRIGLYCERIDEMIA: Primary | ICD-10-CM

## 2019-07-15 NOTE — TELEPHONE ENCOUNTER
----- Message from Nica Keene sent at 7/13/2019 10:01 AM EDT -----  Regarding: Non-Urgent Medical Question  Contact: [phone]  I was wondering, since the increase in the Niacin, if you might be able to check my liver function with the next blood-work? I've been getting infrequent pains in that area and just get concerned  given my history with medications  who knows , it could just be in my head also

## 2019-07-15 NOTE — TELEPHONE ENCOUNTER
Yes that is fine, I put the CMP order in  So he needs to get the triglycerides and CMP checked sometime at the end of July and I will call him with the results

## 2019-07-26 ENCOUNTER — TELEPHONE (OUTPATIENT)
Dept: PAIN MEDICINE | Facility: MEDICAL CENTER | Age: 44
End: 2019-07-26

## 2019-07-26 NOTE — TELEPHONE ENCOUNTER
Pt wife is calling asking if patient can schedule another back inj  Last one was in April and she stated Dr Brittany Hernandez said every 4 months      Ana Hale can be reached at [phone]

## 2019-08-02 PROBLEM — K85.90 PANCREATITIS, UNSPECIFIED PANCREATITIS TYPE: Status: ACTIVE | Noted: 2019-08-02

## 2019-08-05 ENCOUNTER — TELEPHONE (OUTPATIENT)
Dept: GASTROENTEROLOGY | Facility: CLINIC | Age: 44
End: 2019-08-05

## 2019-08-05 NOTE — PRE-PROCEDURE INSTRUCTIONS
Pre-Surgery Instructions:   Medication Instructions    albuterol (VENTOLIN HFA) 90 mcg/act inhaler prn    Choline Fenofibrate (FENOFIBRIC ACID) 135 MG CPDR Patient was instructed by Physician and understands   niacin (NIASPAN) 500 mg CR tablet Patient was instructed by Physician and understands   omega-3-acid ethyl esters (LOVAZA) 1 g capsule Patient was instructed by Physician and understands   Pancrelipase, Lip-Prot-Amyl, (CREON) 49501 units CPEP Patient was instructed by Physician and understands   pantoprazole (PROTONIX) 40 mg tablet Patient was instructed by Physician and understands   ranitidine (ZANTAC) 150 MG capsule Patient was instructed by Physician and understands   rosuvastatin (CRESTOR) 40 MG tablet Patient was instructed by Physician and understands

## 2019-08-06 ENCOUNTER — PATIENT MESSAGE (OUTPATIENT)
Dept: FAMILY MEDICINE CLINIC | Facility: CLINIC | Age: 44
End: 2019-08-06

## 2019-08-06 DIAGNOSIS — K21.9 GASTROESOPHAGEAL REFLUX DISEASE WITHOUT ESOPHAGITIS: ICD-10-CM

## 2019-08-06 DIAGNOSIS — G47.00 INSOMNIA, UNSPECIFIED TYPE: ICD-10-CM

## 2019-08-06 RX ORDER — PANTOPRAZOLE SODIUM 40 MG/1
40 TABLET, DELAYED RELEASE ORAL 2 TIMES DAILY
Qty: 180 TABLET | Refills: 3 | Status: SHIPPED | OUTPATIENT
Start: 2019-08-06 | End: 2020-01-06 | Stop reason: SDUPTHER

## 2019-08-06 RX ORDER — ONDANSETRON 4 MG/1
4 TABLET, ORALLY DISINTEGRATING ORAL EVERY 8 HOURS PRN
Qty: 30 TABLET | Refills: 0 | Status: SHIPPED | OUTPATIENT
Start: 2019-08-06 | End: 2019-10-15 | Stop reason: SDUPTHER

## 2019-08-06 NOTE — TELEPHONE ENCOUNTER
From: Shahana Mccormack  To: LUANA Hector  Sent: 8/6/2019 9:09 AM EDT  Subject: Prescription Question    I am contacting you to see if I can get the zofran and protonix refilled  The refills would have to go to [facility] in [facility] on [id number] as we no longer use [facility]  The protonix is usually a 90 day refill  Also Ms Ananya Sanders has me getting bloodwork done this week

## 2019-08-07 DIAGNOSIS — G47.00 INSOMNIA, UNSPECIFIED TYPE: ICD-10-CM

## 2019-08-08 LAB
ALBUMIN SERPL-MCNC: 3.8 G/DL (ref 3.6–5.1)
ALBUMIN/GLOB SERPL: 1.6 (CALC) (ref 1–2.5)
ALP SERPL-CCNC: 146 U/L (ref 40–115)
ALT SERPL-CCNC: 161 U/L (ref 9–46)
AST SERPL-CCNC: 146 U/L (ref 10–40)
BILIRUB SERPL-MCNC: 1.5 MG/DL (ref 0.2–1.2)
BUN SERPL-MCNC: 6 MG/DL (ref 7–25)
BUN/CREAT SERPL: 8 (CALC) (ref 6–22)
CALCIUM SERPL-MCNC: 9 MG/DL (ref 8.6–10.3)
CHLORIDE SERPL-SCNC: 105 MMOL/L (ref 98–110)
CO2 SERPL-SCNC: 24 MMOL/L (ref 20–32)
CREAT SERPL-MCNC: 0.79 MG/DL (ref 0.6–1.35)
GLOBULIN SER CALC-MCNC: 2.4 G/DL (CALC) (ref 1.9–3.7)
GLUCOSE SERPL-MCNC: 121 MG/DL (ref 65–99)
POTASSIUM SERPL-SCNC: 3.8 MMOL/L (ref 3.5–5.3)
PROT SERPL-MCNC: 6.2 G/DL (ref 6.1–8.1)
SL AMB EGFR AFRICAN AMERICAN: 125 ML/MIN/1.73M2
SL AMB EGFR NON AFRICAN AMERICAN: 108 ML/MIN/1.73M2
SODIUM SERPL-SCNC: 137 MMOL/L (ref 135–146)

## 2019-08-08 RX ORDER — TRAZODONE HYDROCHLORIDE 50 MG/1
TABLET ORAL
Qty: 30 TABLET | Refills: 1 | Status: SHIPPED | OUTPATIENT
Start: 2019-08-08 | End: 2019-12-10 | Stop reason: SDUPTHER

## 2019-08-12 ENCOUNTER — TELEPHONE (OUTPATIENT)
Dept: GASTROENTEROLOGY | Facility: CLINIC | Age: 44
End: 2019-08-12

## 2019-08-12 NOTE — TELEPHONE ENCOUNTER
----- Message from Elder Lowe PA-C sent at 8/12/2019  4:09 PM EDT -----  Please let him know all of his LFTs have bumped up    Have him stop the Niacin and this should be discussed with Dinora when she is back on Wednedsay

## 2019-08-14 ENCOUNTER — TELEPHONE (OUTPATIENT)
Dept: RADIOLOGY | Facility: CLINIC | Age: 44
End: 2019-08-14

## 2019-08-14 NOTE — TELEPHONE ENCOUNTER
S/w [facility] [address] [facility] [place], no auth req for [place] [address] [address]  8/14/19 339 pm  Call ref # [id number]    Pt is scheduled @ [facility] 8/20/19 8 am

## 2019-08-16 ENCOUNTER — TELEPHONE (OUTPATIENT)
Dept: GASTROENTEROLOGY | Facility: CLINIC | Age: 44
End: 2019-08-16

## 2019-08-16 DIAGNOSIS — E78.1 HYPERTRIGLYCERIDEMIA: ICD-10-CM

## 2019-08-16 DIAGNOSIS — R79.89 ELEVATED LFTS: Primary | ICD-10-CM

## 2019-08-16 NOTE — TELEPHONE ENCOUNTER
----- Message from Stephanie Anderson PA-C sent at 8/16/2019  2:18 PM EDT -----  I saw his LFTs were elevated mildly when I was gone and Allen Babcock recommended for him to stop niacin for now  I am going to order a CMP for him to get in 2 weeks along with the triglycerides to see what they are and if his LFTs are better I will have him restart the niacin at 1 5 g at bedtime  The niacin can elevate the liver numbers so the higher dose may have caused it

## 2019-08-19 ENCOUNTER — ANESTHESIA EVENT (OUTPATIENT)
Dept: PERIOP | Facility: HOSPITAL | Age: 44
End: 2019-08-19
Payer: COMMERCIAL

## 2019-08-20 ENCOUNTER — APPOINTMENT (OUTPATIENT)
Dept: RADIOLOGY | Facility: HOSPITAL | Age: 44
End: 2019-08-20
Payer: COMMERCIAL

## 2019-08-20 ENCOUNTER — ANESTHESIA (OUTPATIENT)
Dept: PERIOP | Facility: HOSPITAL | Age: 44
End: 2019-08-20
Payer: COMMERCIAL

## 2019-08-20 ENCOUNTER — HOSPITAL ENCOUNTER (OUTPATIENT)
Facility: HOSPITAL | Age: 44
Setting detail: OUTPATIENT SURGERY
Discharge: HOME | End: 2019-08-20
Attending: ANESTHESIOLOGY | Admitting: ANESTHESIOLOGY
Payer: COMMERCIAL

## 2019-08-20 ENCOUNTER — APPOINTMENT (EMERGENCY)
Dept: CT IMAGING | Facility: HOSPITAL | Age: 44
End: 2019-08-20
Payer: COMMERCIAL

## 2019-08-20 ENCOUNTER — HOSPITAL ENCOUNTER (EMERGENCY)
Facility: HOSPITAL | Age: 44
Discharge: HOME | End: 2019-08-20
Attending: EMERGENCY MEDICINE | Admitting: EMERGENCY MEDICINE
Payer: COMMERCIAL

## 2019-08-20 VITALS
OXYGEN SATURATION: 96 % | HEIGHT: 71 IN | BODY MASS INDEX: 29.07 KG/M2 | DIASTOLIC BLOOD PRESSURE: 67 MMHG | TEMPERATURE: 97.9 F | HEART RATE: 80 BPM | RESPIRATION RATE: 24 BRPM | WEIGHT: 207.67 LBS | SYSTOLIC BLOOD PRESSURE: 113 MMHG

## 2019-08-20 VITALS
DIASTOLIC BLOOD PRESSURE: 81 MMHG | OXYGEN SATURATION: 94 % | RESPIRATION RATE: 18 BRPM | SYSTOLIC BLOOD PRESSURE: 141 MMHG | WEIGHT: 207.67 LBS | HEART RATE: 95 BPM | TEMPERATURE: 98.2 F | BODY MASS INDEX: 28.96 KG/M2

## 2019-08-20 DIAGNOSIS — I95.9 HYPOTENSION: Primary | ICD-10-CM

## 2019-08-20 LAB
ALBUMIN SERPL BCP-MCNC: 3.1 G/DL (ref 3.5–5)
ALP SERPL-CCNC: 185 U/L (ref 46–116)
ALT SERPL W P-5'-P-CCNC: 174 U/L (ref 12–78)
ANION GAP SERPL CALCULATED.3IONS-SCNC: 14 MMOL/L (ref 4–13)
APTT PPP: 29 SECONDS (ref 23–37)
AST SERPL W P-5'-P-CCNC: 205 U/L (ref 5–45)
BASE EX.OXY STD BLDV CALC-SCNC: 91.9 % (ref 60–80)
BASE EXCESS BLDV CALC-SCNC: -1.3 MMOL/L
BASOPHILS # BLD MANUAL: 0 THOUSAND/UL (ref 0–0.1)
BASOPHILS NFR MAR MANUAL: 0 % (ref 0–1)
BILIRUB SERPL-MCNC: 1.5 MG/DL (ref 0.2–1)
BUN SERPL-MCNC: 16 MG/DL (ref 5–25)
CALCIUM SERPL-MCNC: 8 MG/DL (ref 8.3–10.1)
CHLORIDE SERPL-SCNC: 98 MMOL/L (ref 100–108)
CO2 SERPL-SCNC: 22 MMOL/L (ref 21–32)
CREAT SERPL-MCNC: 0.8 MG/DL (ref 0.6–1.3)
EOSINOPHIL # BLD MANUAL: 0 THOUSAND/UL (ref 0–0.4)
EOSINOPHIL NFR BLD MANUAL: 0 % (ref 0–6)
ERYTHROCYTE [DISTWIDTH] IN BLOOD BY AUTOMATED COUNT: 15.4 % (ref 11.6–15.1)
GFR SERPL CREATININE-BSD FRML MDRD: 107 ML/MIN/1.73SQ M
GLUCOSE SERPL-MCNC: 213 MG/DL (ref 65–140)
HCO3 BLDV-SCNC: 23.8 MMOL/L (ref 24–30)
HCT VFR BLD AUTO: 41.3 % (ref 36.5–49.3)
HGB BLD-MCNC: 14.2 G/DL (ref 12–17)
INR PPP: 1.25 (ref 0.84–1.19)
LACTATE SERPL-SCNC: 1.4 MMOL/L (ref 0.5–2)
LIPASE SERPL-CCNC: 81 U/L (ref 73–393)
LYMPHOCYTES # BLD AUTO: 0.65 THOUSAND/UL (ref 0.6–4.47)
LYMPHOCYTES # BLD AUTO: 24 % (ref 14–44)
MAGNESIUM SERPL-MCNC: 1.6 MG/DL (ref 1.6–2.6)
MCH RBC QN AUTO: 34.1 PG (ref 26.8–34.3)
MCHC RBC AUTO-ENTMCNC: 34.4 G/DL (ref 31.4–37.4)
MCV RBC AUTO: 99 FL (ref 82–98)
MONOCYTES # BLD AUTO: 0.05 THOUSAND/UL (ref 0–1.22)
MONOCYTES NFR BLD: 2 % (ref 4–12)
NEUTROPHILS # BLD MANUAL: 1.92 THOUSAND/UL (ref 1.85–7.62)
NEUTS SEG NFR BLD AUTO: 71 % (ref 43–75)
NRBC BLD AUTO-RTO: 0 /100 WBCS
NT-PROBNP SERPL-MCNC: 11 PG/ML
O2 CT BLDV-SCNC: 18.8 ML/DL
PCO2 BLDV: 41.3 MM HG (ref 42–50)
PH BLDV: 7.38 [PH] (ref 7.3–7.4)
PLATELET # BLD AUTO: 112 THOUSANDS/UL (ref 149–390)
PLATELET BLD QL SMEAR: ABNORMAL
PMV BLD AUTO: 10.4 FL (ref 8.9–12.7)
PO2 BLDV: 87.9 MM HG (ref 35–45)
POTASSIUM SERPL-SCNC: 4.1 MMOL/L (ref 3.5–5.3)
PROCALCITONIN SERPL-MCNC: 0.89 NG/ML
PROT SERPL-MCNC: 6.2 G/DL (ref 6.4–8.2)
PROTHROMBIN TIME: 15.8 SECONDS (ref 11.6–14.5)
RBC # BLD AUTO: 4.17 MILLION/UL (ref 3.88–5.62)
SODIUM SERPL-SCNC: 134 MMOL/L (ref 136–145)
TOTAL CELLS COUNTED SPEC: 100
TROPONIN I SERPL-MCNC: <0.02 NG/ML
TROPONIN I SERPL-MCNC: <0.02 NG/ML
VARIANT LYMPHS # BLD AUTO: 3 %
WBC # BLD AUTO: 2.71 THOUSAND/UL (ref 4.31–10.16)

## 2019-08-20 PROCEDURE — 83735 ASSAY OF MAGNESIUM: CPT | Performed by: EMERGENCY MEDICINE

## 2019-08-20 PROCEDURE — 93005 ELECTROCARDIOGRAM TRACING: CPT

## 2019-08-20 PROCEDURE — 83880 ASSAY OF NATRIURETIC PEPTIDE: CPT | Performed by: EMERGENCY MEDICINE

## 2019-08-20 PROCEDURE — 82805 BLOOD GASES W/O2 SATURATION: CPT | Performed by: EMERGENCY MEDICINE

## 2019-08-20 PROCEDURE — 85007 BL SMEAR W/DIFF WBC COUNT: CPT | Performed by: EMERGENCY MEDICINE

## 2019-08-20 PROCEDURE — 99285 EMERGENCY DEPT VISIT HI MDM: CPT

## 2019-08-20 PROCEDURE — 74176 CT ABD & PELVIS W/O CONTRAST: CPT

## 2019-08-20 PROCEDURE — 71250 CT THORAX DX C-: CPT

## 2019-08-20 PROCEDURE — 84484 ASSAY OF TROPONIN QUANT: CPT | Performed by: EMERGENCY MEDICINE

## 2019-08-20 PROCEDURE — 87040 BLOOD CULTURE FOR BACTERIA: CPT | Performed by: EMERGENCY MEDICINE

## 2019-08-20 PROCEDURE — 83605 ASSAY OF LACTIC ACID: CPT | Performed by: EMERGENCY MEDICINE

## 2019-08-20 PROCEDURE — 85610 PROTHROMBIN TIME: CPT | Performed by: EMERGENCY MEDICINE

## 2019-08-20 PROCEDURE — 36415 COLL VENOUS BLD VENIPUNCTURE: CPT | Performed by: EMERGENCY MEDICINE

## 2019-08-20 PROCEDURE — 83690 ASSAY OF LIPASE: CPT | Performed by: EMERGENCY MEDICINE

## 2019-08-20 PROCEDURE — 64680 INJECTION TREATMENT OF NERVE: CPT | Performed by: ANESTHESIOLOGY

## 2019-08-20 PROCEDURE — 99284 EMERGENCY DEPT VISIT MOD MDM: CPT | Performed by: EMERGENCY MEDICINE

## 2019-08-20 PROCEDURE — 85730 THROMBOPLASTIN TIME PARTIAL: CPT | Performed by: EMERGENCY MEDICINE

## 2019-08-20 PROCEDURE — 85027 COMPLETE CBC AUTOMATED: CPT | Performed by: EMERGENCY MEDICINE

## 2019-08-20 PROCEDURE — 80053 COMPREHEN METABOLIC PANEL: CPT | Performed by: EMERGENCY MEDICINE

## 2019-08-20 PROCEDURE — 76000 FLUOROSCOPY <1 HR PHYS/QHP: CPT

## 2019-08-20 PROCEDURE — 84145 PROCALCITONIN (PCT): CPT | Performed by: EMERGENCY MEDICINE

## 2019-08-20 RX ORDER — PROPOFOL 10 MG/ML
INJECTION, EMULSION INTRAVENOUS CONTINUOUS PRN
Status: DISCONTINUED | OUTPATIENT
Start: 2019-08-20 | End: 2019-08-20 | Stop reason: SURG

## 2019-08-20 RX ORDER — LIDOCAINE HYDROCHLORIDE 10 MG/ML
INJECTION, SOLUTION EPIDURAL; INFILTRATION; INTRACAUDAL; PERINEURAL AS NEEDED
Status: DISCONTINUED | OUTPATIENT
Start: 2019-08-20 | End: 2019-08-20 | Stop reason: SURG

## 2019-08-20 RX ORDER — SODIUM CHLORIDE, SODIUM LACTATE, POTASSIUM CHLORIDE, CALCIUM CHLORIDE 600; 310; 30; 20 MG/100ML; MG/100ML; MG/100ML; MG/100ML
125 INJECTION, SOLUTION INTRAVENOUS CONTINUOUS
Status: DISCONTINUED | OUTPATIENT
Start: 2019-08-20 | End: 2019-08-20 | Stop reason: HOSPADM

## 2019-08-20 RX ORDER — SODIUM CHLORIDE, SODIUM LACTATE, POTASSIUM CHLORIDE, CALCIUM CHLORIDE 600; 310; 30; 20 MG/100ML; MG/100ML; MG/100ML; MG/100ML
100 INJECTION, SOLUTION INTRAVENOUS CONTINUOUS
Status: CANCELLED | OUTPATIENT
Start: 2019-08-20 | End: 2019-08-20

## 2019-08-20 RX ORDER — BUPIVACAINE HYDROCHLORIDE 2.5 MG/ML
INJECTION, SOLUTION INFILTRATION; PERINEURAL AS NEEDED
Status: DISCONTINUED | OUTPATIENT
Start: 2019-08-20 | End: 2019-08-20 | Stop reason: HOSPADM

## 2019-08-20 RX ORDER — 0.9 % SODIUM CHLORIDE 0.9 %
VIAL (ML) INJECTION AS NEEDED
Status: DISCONTINUED | OUTPATIENT
Start: 2019-08-20 | End: 2019-08-20 | Stop reason: HOSPADM

## 2019-08-20 RX ORDER — METOCLOPRAMIDE HYDROCHLORIDE 5 MG/ML
10 INJECTION INTRAMUSCULAR; INTRAVENOUS ONCE AS NEEDED
Status: DISCONTINUED | OUTPATIENT
Start: 2019-08-20 | End: 2019-08-20 | Stop reason: HOSPADM

## 2019-08-20 RX ORDER — LIDOCAINE HYDROCHLORIDE 20 MG/ML
INJECTION, SOLUTION INFILTRATION; PERINEURAL AS NEEDED
Status: DISCONTINUED | OUTPATIENT
Start: 2019-08-20 | End: 2019-08-20 | Stop reason: HOSPADM

## 2019-08-20 RX ORDER — FENTANYL CITRATE/PF 50 MCG/ML
25 SYRINGE (ML) INJECTION
Status: DISCONTINUED | OUTPATIENT
Start: 2019-08-20 | End: 2019-08-20 | Stop reason: HOSPADM

## 2019-08-20 RX ORDER — ONDANSETRON 2 MG/ML
INJECTION INTRAMUSCULAR; INTRAVENOUS AS NEEDED
Status: DISCONTINUED | OUTPATIENT
Start: 2019-08-20 | End: 2019-08-20 | Stop reason: SURG

## 2019-08-20 RX ORDER — MIDAZOLAM HYDROCHLORIDE 1 MG/ML
INJECTION INTRAMUSCULAR; INTRAVENOUS AS NEEDED
Status: DISCONTINUED | OUTPATIENT
Start: 2019-08-20 | End: 2019-08-20 | Stop reason: SURG

## 2019-08-20 RX ORDER — DEXAMETHASONE SODIUM PHOSPHATE 10 MG/ML
INJECTION, SOLUTION INTRAMUSCULAR; INTRAVENOUS AS NEEDED
Status: DISCONTINUED | OUTPATIENT
Start: 2019-08-20 | End: 2019-08-20 | Stop reason: HOSPADM

## 2019-08-20 RX ORDER — HYDROMORPHONE HCL/PF 1 MG/ML
0.5 SYRINGE (ML) INJECTION
Status: DISCONTINUED | OUTPATIENT
Start: 2019-08-20 | End: 2019-08-20 | Stop reason: HOSPADM

## 2019-08-20 RX ORDER — FENTANYL CITRATE 50 UG/ML
INJECTION, SOLUTION INTRAMUSCULAR; INTRAVENOUS AS NEEDED
Status: DISCONTINUED | OUTPATIENT
Start: 2019-08-20 | End: 2019-08-20 | Stop reason: SURG

## 2019-08-20 RX ORDER — 0.9 % SODIUM CHLORIDE 0.9 %
3 VIAL (ML) INJECTION AS NEEDED
Status: DISCONTINUED | OUTPATIENT
Start: 2019-08-20 | End: 2019-08-20 | Stop reason: HOSPADM

## 2019-08-20 RX ORDER — ONDANSETRON 2 MG/ML
4 INJECTION INTRAMUSCULAR; INTRAVENOUS ONCE AS NEEDED
Status: DISCONTINUED | OUTPATIENT
Start: 2019-08-20 | End: 2019-08-20 | Stop reason: HOSPADM

## 2019-08-20 RX ORDER — PROPOFOL 10 MG/ML
INJECTION, EMULSION INTRAVENOUS AS NEEDED
Status: DISCONTINUED | OUTPATIENT
Start: 2019-08-20 | End: 2019-08-20 | Stop reason: SURG

## 2019-08-20 RX ADMIN — FENTANYL CITRATE 25 MCG: 50 INJECTION, SOLUTION INTRAMUSCULAR; INTRAVENOUS at 09:03

## 2019-08-20 RX ADMIN — MIDAZOLAM HYDROCHLORIDE 2 MG: 1 INJECTION, SOLUTION INTRAMUSCULAR; INTRAVENOUS at 08:13

## 2019-08-20 RX ADMIN — SODIUM CHLORIDE, SODIUM LACTATE, POTASSIUM CHLORIDE, AND CALCIUM CHLORIDE 125 ML/HR: .6; .31; .03; .02 INJECTION, SOLUTION INTRAVENOUS at 07:31

## 2019-08-20 RX ADMIN — FENTANYL CITRATE 50 MCG: 50 INJECTION, SOLUTION INTRAMUSCULAR; INTRAVENOUS at 08:16

## 2019-08-20 RX ADMIN — PROPOFOL 100 MG: 10 INJECTION, EMULSION INTRAVENOUS at 08:17

## 2019-08-20 RX ADMIN — FENTANYL CITRATE 50 MCG: 50 INJECTION, SOLUTION INTRAMUSCULAR; INTRAVENOUS at 08:20

## 2019-08-20 RX ADMIN — PROPOFOL 30 MG: 10 INJECTION, EMULSION INTRAVENOUS at 08:21

## 2019-08-20 RX ADMIN — FENTANYL CITRATE 25 MCG: 50 INJECTION, SOLUTION INTRAMUSCULAR; INTRAVENOUS at 09:08

## 2019-08-20 RX ADMIN — FENTANYL CITRATE 25 MCG: 50 INJECTION, SOLUTION INTRAMUSCULAR; INTRAVENOUS at 08:56

## 2019-08-20 RX ADMIN — PROPOFOL 50 MG: 10 INJECTION, EMULSION INTRAVENOUS at 08:19

## 2019-08-20 RX ADMIN — PROPOFOL 50 MG: 10 INJECTION, EMULSION INTRAVENOUS at 08:28

## 2019-08-20 RX ADMIN — HYDROMORPHONE HYDROCHLORIDE 0.5 MG: 1 INJECTION, SOLUTION INTRAMUSCULAR; INTRAVENOUS; SUBCUTANEOUS at 09:19

## 2019-08-20 RX ADMIN — SODIUM CHLORIDE 1000 ML: 0.9 INJECTION, SOLUTION INTRAVENOUS at 11:56

## 2019-08-20 RX ADMIN — LIDOCAINE HYDROCHLORIDE 50 MG: 10 INJECTION, SOLUTION EPIDURAL; INFILTRATION; INTRACAUDAL; PERINEURAL at 08:17

## 2019-08-20 RX ADMIN — PROPOFOL 150 MCG/KG/MIN: 10 INJECTION, EMULSION INTRAVENOUS at 08:18

## 2019-08-20 RX ADMIN — PROPOFOL 50 MG: 10 INJECTION, EMULSION INTRAVENOUS at 08:29

## 2019-08-20 RX ADMIN — HYDROMORPHONE HYDROCHLORIDE 0.5 MG: 1 INJECTION, SOLUTION INTRAMUSCULAR; INTRAVENOUS; SUBCUTANEOUS at 09:12

## 2019-08-20 RX ADMIN — ONDANSETRON 4 MG: 2 INJECTION INTRAMUSCULAR; INTRAVENOUS at 08:18

## 2019-08-20 NOTE — DISCHARGE INSTRUCTIONS
Abdominal Pain   WHAT YOU NEED TO KNOW:   Abdominal pain can be dull, achy, or sharp  You may have pain in one area of your abdomen, or in your entire abdomen  Your pain may be caused by a condition such as constipation, food sensitivity or poisoning, infection, or a blockage  Abdominal pain can also be from a hernia, appendicitis, or an ulcer  Liver, gallbladder, or kidney conditions can also cause abdominal pain  The cause of your abdominal pain may be unknown  DISCHARGE INSTRUCTIONS:   Return to the emergency department if:   · You have new chest pain or shortness of breath  · You have pulsing pain in your upper abdomen or lower back that suddenly becomes constant  · Your pain is in the right lower abdominal area and worsens with movement  · You have a fever over 100 4°F (38°C) or shaking chills  · You are vomiting and cannot keep food or liquids down  · Your pain does not improve or gets worse over the next 8 to 12 hours  · You see blood in your vomit or bowel movements, or they look black and tarry  · Your skin or the whites of your eyes turn yellow  · You are a woman and have a large amount of vaginal bleeding that is not your monthly period  Contact your healthcare provider if:   · You have pain in your lower back  · You are a man and have pain in your testicles  · You have pain when you urinate  · You have questions or concerns about your condition or care  Follow up with your healthcare provider within 24 hours or as directed:  Write down your questions so you remember to ask them during your visits  Medicines:   · Medicines  may be given to calm your stomach and prevent vomiting or to decrease pain  Ask how to take pain medicine safely  · Take your medicine as directed  Contact your healthcare provider if you think your medicine is not helping or if you have side effects  Tell him of her if you are allergic to any medicine   Keep a list of the medicines, vitamins, and herbs you take  Include the amounts, and when and why you take them  Bring the list or the pill bottles to follow-up visits  Carry your medicine list with you in case of an emergency  © 2017 [employer]  Information is for End User's use only and may not be sold, redistributed or otherwise used for commercial purposes  All illustrations and images included in CareNotes® are the copyrighted property of A D A M [employer] Inc  or [employer]  The above information is an  only  It is not intended as medical advice for individual conditions or treatments  Talk to your doctor, nurse or pharmacist before following any medical regimen to see if it is safe and effective for you  PROCEDURE DISCHARGE INSTRUCTIONS: BILATERAL SPLANCHNIC NERVE BLOCK INJECTION      ACTIVITY  · Do not drive or operate machinery today  · No strenuous activity today - bending, lifting, etc    · You may resume normal activities starting tomorrow - start slowly and as tolerated  · You may shower today, but not tub baths or hot tubs  · Please use caution and common sense, especially with weight-bearing activities  CARE OF THE INJECTION SITE  · If you have soreness or pain apply ice to the area today (20 minutes on and 20 minutes off)  · Starting tomorrow, you can take off Bandaid  · Notify [facility] if you have any of the following: redness, drainage, swelling or fever above 100°F     SPECIAL INSTRUCTIONS  · You may experience diarrhea over the next 24hrs  This is normal    MEDICATIONS  · Please call my office to schedule follow up with me or my PA in 6 week  · Continue to take all routine medications

## 2019-08-20 NOTE — ANESTHESIA PREPROCEDURE EVALUATION
Review of Systems/Medical History  Patient summary reviewed  Chart reviewed  No history of anesthetic complications     Cardiovascular  Exercise tolerance (METS): >4,  Hyperlipidemia, Hypertension , Dysrhythmias (RBBB) ,    Pulmonary  Smoker , Tobacco cessation counseling given , Asthma ,        GI/Hepatic    GERD , Liver disease (liver abscess) ,   Comment: Confirmed NPO appropriate  Chronic pancreatitis 2/2 hypertriglyceridemia, most recent flare 3/2019     Negative  ROS        Endo/Other  Negative endo/other ROS      GYN       Hematology  Negative hematology ROS      Musculoskeletal  Negative musculoskeletal ROS        Neurology  Negative neurology ROS      Psychology     No chronic opioid dependence Chronic pain,            Physical Exam    Airway    Mallampati score: II  TM Distance: >3 FB  Neck ROM: full     Dental       Cardiovascular  Rhythm: regular, Rate: normal, Cardiovascular exam normal    Pulmonary  Pulmonary exam normal Breath sounds clear to auscultation,     Other Findings        Anesthesia Plan  ASA Score- 2     Anesthesia Type- IV sedation with anesthesia with ASA Monitors  Additional Monitors:   Airway Plan:     Comment: I discussed the risks and benefits of IV sedation anesthesia including the possibility of the need to convert to general anesthesia and the potential risk of awareness  The patient was given the opportunity to ask questions, which were answered        Plan Factors-    Induction- intravenous  Postoperative Plan-     Informed Consent- Anesthetic plan and risks discussed with patient  I personally reviewed this patient with the CRNA  Discussed and agreed on the Anesthesia Plan with the CRNA             Lab Results   Component Value Date    WBC 4 48 04/17/2019    HGB 12 6 04/17/2019    HCT 37 6 04/17/2019    MCV 96 04/17/2019     (L) 04/17/2019     Lab Results   Component Value Date    SODIUM 137 08/07/2019    K 3 8 08/07/2019     08/07/2019    CO2 24 08/07/2019    BUN 6 (L) 08/07/2019    CREATININE 0 79 08/07/2019    GLUC 121 (H) 08/07/2019    CALCIUM 9 0 08/07/2019     Lab Results   Component Value Date     (H) 08/07/2019     (H) 08/07/2019    ALKPHOS 146 (H) 08/07/2019    BILITOT 1 8 (H) 04/26/2017

## 2019-08-20 NOTE — ED PROVIDER NOTES
History  Chief Complaint   Patient presents with    Hypotension     pt with hx of nerve block for chronic pain; received dilaudid after procedure; reporting weakness, ambulatory issues     53 year male presents to the ED for evaluation of transient hypotension s/p spinal injections for chronic pain  He has had the procedure done multiple times in the past and has never had this issue  Patient states that he the injections done, status post injections the was a lot of pain was given two doses of Dilaudid  He has had Dilaudid on multiple occasions in the past and states he has never had a reaction like this to that  And looking through the patient's previous visit the patient is hypotensive at 78/47  Patient did have rebound of his blood pressure to normal and was discharged and had been across the street getting something to eat was wife noted that he was nodding off  He was not easily arousable and she but in the car and brought him back here  While here in the waiting room the patient also had what sounds like a near syncopal episode but was easily arousable  Currently, in bed, supine, the patient as a blood pressure 117/64  The patient is not tachycardic  The patient will be evaluated with a differential diagnosis which includes was not limited to drug reaction, injury to adjacent organs during the time of pain control injections, dehydration  History provided by:  Patient   used: No    Medical Problem   Severity:  Mild  Onset quality:  Gradual  Timing:  Constant  Progression:  Worsening  Chronicity:  New  Associated symptoms: no abdominal pain, no cough, no ear pain, no fever, no headaches, no myalgias, no rhinorrhea, no sore throat and no wheezing        Prior to Admission Medications   Prescriptions Last Dose Informant Patient Reported? Taking?    Choline Fenofibrate (FENOFIBRIC ACID) 135 MG CPDR   No No   Sig: Take 1 capsule (135 mg total) by mouth daily   EPINEPHrine (EPIPEN 2-ARIEL) 0 3 mg/0 3 mL SOAJ  Self Yes No   Sig: EpiPen 2-Ariel 0 3 MG/0 3ML Injection Solution Auto-injector  INJECT 0 3ML INTRAMUSCULARLY AS DIRECTED     Quantity: 1;  Refills: 1      Toro Briceño ; Active  2 Solution Auto-injector Pen   Pancrelipase, Lip-Prot-Amyl, (CREON) 13171 units CPEP   No No   Sig: Take 1 capsule (36,000 Units total) by mouth 3 (three) times a day before meals   albuterol (VENTOLIN HFA) 90 mcg/act inhaler  Self No No   Sig: Inhale 2 puffs every 6 (six) hours as needed for wheezing Prn   niacin (NIASPAN) 500 mg CR tablet   No No   Sig: Take 3 tablets (1,500 mg total) by mouth daily at bedtime   nicotine (NICODERM CQ) 21 mg/24 hr TD 24 hr patch  Self No No   Sig: Place 1 patch on the skin every 24 hours   omega-3-acid ethyl esters (LOVAZA) 1 g capsule   No No   Sig: Take 2 capsules (2 g total) by mouth 2 (two) times a day   ondansetron (ZOFRAN-ODT) 4 mg disintegrating tablet   No No   Sig: Take 1 tablet (4 mg total) by mouth every 8 (eight) hours as needed for nausea or vomiting for up to 90 days   oxyCODONE (OxyCONTIN) 10 mg 12 hr tablet   No No   Sig: Take 1 tablet (10 mg total) by mouth every 12 (twelve) hoursMax Daily Amount: 20 mg   oxyCODONE (ROXICODONE) 5 mg immediate release tablet   No No   Sig: Take 1 tablet (5 mg total) by mouth every 4 (four) hours as needed for moderate painMax Daily Amount: 30 mg   pancrelipase, Lip-Prot-Amyl, (CREON) 12,000 units capsule   No No   Sig: Take 12,000 units of lipase by mouth as needed for snacks   pantoprazole (PROTONIX) 40 mg tablet   No No   Sig: Take 1 tablet (40 mg total) by mouth 2 (two) times a day for 90 days   pregabalin (LYRICA) 100 mg capsule   No No   Sig: Take 1 capsule (100 mg total) by mouth 3 (three) times a day for 30 days   ranitidine (ZANTAC) 150 MG capsule  Self Yes No   Sig: Take 150 mg by mouth 2 (two) times a day   rosuvastatin (CRESTOR) 40 MG tablet   No No   Sig: Take 1 tablet (40 mg total) by mouth daily   traZODone (DESYREL) 50 mg tablet   No No   Sig: TAKE ONE TABLET BY MOUTH NIGHTLY AT BEDTIME       Facility-Administered Medications: None       Past Medical History:   Diagnosis Date    Asthma     Chronic pain disorder     GERD (gastroesophageal reflux disease)     Hyperlipidemia     Liver abscess     Meniscus tear     left knee work injury last assessed 08/24/2016    Pancreatitis     Pneumonia        Past Surgical History:   Procedure Laterality Date    CELIAC PLEXUS BLOCK Bilateral 10/29/2018    Procedure: SPLANCHNIC NERVE BLOCK;  Surgeon: Erik Padron MD;  [facility];  Service: Pain Management     CELIAC PLEXUS BLOCK Bilateral 1/10/2019    Procedure: SPLANCHNIC NERVE BLOCK;  Surgeon: Erik Padron MD;  [facility];  Service: Pain Management     CHOLECYSTECTOMY      ESOPHAGOGASTRODUODENOSCOPY N/A 11/16/2017    Procedure: ESOPHAGOGASTRODUODENOSCOPY (EGD); Surgeon: Jim Andrade MD;  [facility]; Service: Gastroenterology    ESOPHAGOGASTRODUODENOSCOPY N/A 4/19/2018    Procedure: ESOPHAGOGASTRODUODENOSCOPY (EGD); Surgeon: Rivas Stevens MD;  [facility]; Service: Gastroenterology    ESOPHAGOGASTRODUODENOSCOPY N/A 5/10/2018    Procedure: ESOPHAGOGASTRODUODENOSCOPY (EGD); Surgeon: Juan Francisco Ogden MD;  [facility];   Service: Gastroenterology    [facility]  2/28/2019    KNEE ARTHROSCOPY Bilateral     KNEE ARTHROSCOPY Right 2009    cibishino last assessed 08/24/2016    KNEE ARTHROSCOPY Right 07/01/2019    LIVER SURGERY      NERVE BLOCK Bilateral 5/29/2018    Procedure: SPLANCHNIC NERVE BLOCK;  Surgeon: Erik Padron MD;  [facility];  Service: Pain Management     PANCREAS SURGERY      stents    PANCREATIC CYST EXCISION      HI INJECT NERV BLCK,CELIAC PLEXUS Bilateral 5/9/2017    Procedure: CELIAC PLEXUS BLOCK ;  Surgeon: rEik Padron MD;  [facility];  Service: Pain Management     HI INJECT NERV BLCK,CELIAC PLEXUS Bilateral 6/1/2017    Procedure: SPLANCHNIC NERVE BLOCK at T12;  Surgeon: Geoff Dunne MD;  [facility];  Service: Pain Management     MD INJECT NERV BLCK,CELIAC PLEXUS Bilateral 8/8/2017    Procedure: BILATERAL SPLANCHNIC NERVE BLOCK T12;  Surgeon: Geoff Dunne MD;  [facility];  Service: Pain Management     MD INJECT NERV BLCK,CELIAC PLEXUS Bilateral 4/18/2019    Procedure: BLOCK / INJECTION CELIAC PLEXUS;  Surgeon: Geoff Dunne MD;  [facility];  Service: Pain Management     MD INJECT NERV BLCK,CELIAC PLEXUS Bilateral 8/20/2019    Procedure: SPLANCHNIC NERVE BLOCK;  Surgeon: eGoff Dunne MD;  [facility];  Service: Pain Management     MD INJECT NERV Lawrence Ping Bilateral 12/28/2017    Procedure: SPLANCHNIC NERVE BLOCK;  Surgeon: Geoff Dunne MD;  [facility];  Service: Pain Management     MD LAP,CHOLECYSTECTOMY N/A 2/14/2017    Procedure: LAPAROSCOPIC CHOLECYSTECTOMY, IOC, POSSIBLE OPEN ;  Surgeon: Patricia Valdes MD;  [facility];  Service: General    ROTATOR CUFF REPAIR Right     SHOULDER ARTHROSCOPY      SHOULDER ARTHROSCOPY Right        Family History   Problem Relation Age of Onset    Cirrhosis Mother     Heart disease Other         cardiac disorder    Cancer Other      I have reviewed and agree with the history as documented  Social History     Tobacco Use    Smoking status: Current Every Day Smoker     Packs/day: 0 50     Years: 20 00     Pack years: 10 00    Smokeless tobacco: Never Used   Substance Use Topics    Alcohol use: Yes     Alcohol/week: 1 0 standard drinks     Types: 1 Cans of beer per week     Drinks per session: 3 or 4     Comment: rarely, occasionally, history of no alcohol use per allscripts    Drug use: No        Review of Systems   Constitutional: Negative for fever  HENT: Negative for ear pain, rhinorrhea and sore throat  Respiratory: Negative for cough and wheezing      Gastrointestinal: Negative for abdominal pain  Musculoskeletal: Negative for myalgias  Neurological: Negative for headaches  All other systems reviewed and are negative  Physical Exam  Physical Exam   Constitutional: He is oriented to person, place, and time  He appears well-developed and well-nourished  No distress  HENT:   Head: Normocephalic and atraumatic  Right Ear: External ear normal    Left Ear: External ear normal    Eyes: Conjunctivae and EOM are normal  Right eye exhibits no discharge  Left eye exhibits no discharge  No scleral icterus  Neck: Normal range of motion  Neck supple  No JVD present  No tracheal deviation present  No thyromegaly present  Cardiovascular: Normal rate and regular rhythm  Pulmonary/Chest: Effort normal and breath sounds normal  No stridor  No respiratory distress  He has no wheezes  He has no rales  Abdominal: Soft  Bowel sounds are normal  He exhibits no distension  There is no tenderness  Musculoskeletal: Normal range of motion  He exhibits no edema, tenderness or deformity  Neurological: He is alert and oriented to person, place, and time  No cranial nerve deficit  Coordination normal    Skin: Skin is warm and dry  He is not diaphoretic  Psychiatric: He has a normal mood and affect  His behavior is normal    Nursing note and vitals reviewed        Vital Signs  ED Triage Vitals   Temperature Pulse Respirations Blood Pressure SpO2   08/20/19 1116 08/20/19 1114 08/20/19 1114 08/20/19 1115 08/20/19 1114   98 2 °F (36 8 °C) 83 20 127/68 96 %      Temp Source Heart Rate Source Patient Position - Orthostatic VS BP Location FiO2 (%)   08/20/19 1116 08/20/19 1114 08/20/19 1114 08/20/19 1114 --   Oral Monitor Sitting Right arm       Pain Score       08/20/19 1118       No Pain           Vitals:    08/20/19 1415 08/20/19 1617 08/20/19 1618 08/20/19 1619   BP: 130/78 140/81 139/75 141/81   Pulse: 76 83 86 95   Patient Position - Orthostatic VS: Lying Lying - Orthostatic VS Sitting - Orthostatic VS Standing - Orthostatic VS         Visual Acuity      ED Medications  Medications   sodium chloride 0 9 % bolus 1,000 mL (0 mL Intravenous Stopped 8/20/19 1209)       Diagnostic Studies  Results Reviewed     Procedure Component Value Units Date/Time    Blood culture [[id number]] Collected:  08/20/19 1145    Lab Status:  Preliminary result Specimen:  Blood from Arm, Left Updated:  08/21/19 2201     Blood Culture No Growth at 24 hrs  Blood culture [[id number]] Collected:  08/20/19 1145    Lab Status:  Preliminary result Specimen:  Blood from Arm, Left Updated:  08/21/19 2201     Blood Culture No Growth at 24 hrs  Procalcitonin [[id number]]  (Abnormal) Collected:  08/20/19 1145    Lab Status:  Final result Specimen:  Blood from Arm, Left Updated:  08/20/19 1853     Procalcitonin 0 89 ng/ml     Troponin I [[id number]]  (Normal) Collected:  08/20/19 1450    Lab Status:  Final result Specimen:  Blood from Arm, Right Updated:  08/20/19 1514     Troponin I <0 02 ng/mL     Lactate Blood [[id number]]  (Normal) Collected:  08/20/19 1145    Lab Status:  Final result Specimen:  Blood from Arm, Left Updated:  08/20/19 1336     LACTIC ACID 1 4 mmol/L     Narrative:       Result may be elevated if tourniquet was used during collection      CBC and differential [[id number]]  (Abnormal) Collected:  08/20/19 1145    Lab Status:  Final result Specimen:  Blood from Arm, Left Updated:  08/20/19 1256     WBC 2 71 Thousand/uL      RBC 4 17 Million/uL      Hemoglobin 14 2 g/dL      Hematocrit 41 3 %      MCV 99 fL      MCH 34 1 pg      MCHC 34 4 g/dL      RDW 15 4 %      MPV 10 4 fL      Platelets 578 Thousands/uL      nRBC 0 /100 WBCs     B-type natriuretic peptide [[id number]]  (Normal) Collected:  08/20/19 1145    Lab Status:  Final result Specimen:  Blood from Arm, Left Updated:  08/20/19 1253     NT-proBNP 11 pg/mL     Comprehensive metabolic panel [[id number]]  (Abnormal) Collected:  08/20/19 1145    Lab Status:  Final result Specimen:  Blood from Arm, Left Updated:  08/20/19 1253     Sodium 134 mmol/L      Potassium 4 1 mmol/L      Chloride 98 mmol/L      CO2 22 mmol/L      ANION GAP 14 mmol/L      BUN 16 mg/dL      Creatinine 0 80 mg/dL      Glucose 213 mg/dL      Calcium 8 0 mg/dL       U/L       U/L      Alkaline Phosphatase 185 U/L      Total Protein 6 2 g/dL      Albumin 3 1 g/dL      Total Bilirubin 1 50 mg/dL      eGFR 107 ml/min/1 73sq m     Narrative:       [place] guidelines for Chronic Kidney Disease (CKD):     Stage 1 with normal or high GFR (GFR > 90 mL/min/1 73 square meters)    Stage 2 Mild CKD (GFR = 60-89 mL/min/1 73 square meters)    Stage 3A Moderate CKD (GFR = 45-59 mL/min/1 73 square meters)    Stage 3B Moderate CKD (GFR = 30-44 mL/min/1 73 square meters)    Stage 4 Severe CKD (GFR = 15-29 mL/min/1 73 square meters)    Stage 5 End Stage CKD (GFR <15 mL/min/1 73 square meters)  Note: GFR calculation is accurate only with a steady state creatinine    Lipase [[id number]]  (Normal) Collected:  08/20/19 1145    Lab Status:  Final result Specimen:  Blood from Arm, Left Updated:  08/20/19 1253     Lipase 81 u/L     Magnesium [[id number]]  (Normal) Collected:  08/20/19 1145    Lab Status:  Final result Specimen:  Blood from Arm, Left Updated:  08/20/19 1253     Magnesium 1 6 mg/dL     Troponin I [[id number]]  (Normal) Collected:  08/20/19 1145    Lab Status:  Final result Specimen:  Blood from Arm, Left Updated:  08/20/19 1213     Troponin I <0 02 ng/mL     Protime-INR [[id number]]  (Abnormal) Collected:  08/20/19 1145    Lab Status:  Final result Specimen:  Blood from Arm, Left Updated:  08/20/19 1209     Protime 15 8 seconds      INR 1 25    APTT [[id number]]  (Normal) Collected:  08/20/19 1145    Lab Status:  Final result Specimen:  Blood from Arm, Left Updated:  08/20/19 1209     PTT 29 seconds     Blood gas, venous [[id number]]  (Abnormal) Collected:  08/20/19 1145    Lab Status:  Final result Specimen:  Blood from Arm, Left Updated:  08/20/19 1157     pH, Shola 7 379     pCO2, Shola 41 3 mm Hg      pO2, Shola 87 9 mm Hg      HCO3, Shola 23 8 mmol/L      Base Excess, Shola -1 3 mmol/L      O2 Content, Shola 18 8 ml/dL      O2 HGB, VENOUS 91 9 %                  CT chest abdomen pelvis wo contrast   Final Result by Bryce Ryan MD (08/20 1407)      1  Retroperitoneal contrast along the right proximal psoas/diaphragmatic leodan with retrocrural gas and stranding around the celiac axis  These changes are likely related to recent bilateral splanchnic nerve block though correlation with procedural    technique recommended  2   Hepatomegaly with diffuse hepatic steatosis  3   Bilateral hip AVN  Workstation performed: [id number]                    Procedures  Procedures       ED Course                               MDM  Number of Diagnoses or Management Options  Hypotension: new and requires workup     Amount and/or Complexity of Data Reviewed  Clinical lab tests: reviewed and ordered  Tests in the radiology section of CPT®: ordered and reviewed  Decide to obtain previous medical records or to obtain history from someone other than the patient: yes  Review and summarize past medical records: yes    Patient Progress  Patient progress: stable      Disposition  Final diagnoses:   Hypotension     Time reflects when diagnosis was documented in both MDM as applicable and the Disposition within this note     Time User Action Codes Description Comment    8/20/2019  4:30 PM Christopher Pack Add [I95 9] Hypotension       ED Disposition     ED Disposition Condition Date/Time Comment    Discharge Stable Tue Aug 20, 2019  4:30 PM Hugo Aldrich discharge to home/self care              Follow-up Information     Follow up With Specialties Details Why Contact Info    Carlos Nuñez, [address], Nurse Practitioner   [place] [address]  [phone] Discharge Medication List as of 8/20/2019  4:30 PM      CONTINUE these medications which have NOT CHANGED    Details   albuterol (VENTOLIN HFA) 90 mcg/act inhaler Inhale 2 puffs every 6 (six) hours as needed for wheezing Prn, Starting Thu 1/10/2019, Until Fri 1/10/2020, Normal      Choline Fenofibrate (FENOFIBRIC ACID) 135 MG CPDR Take 1 capsule (135 mg total) by mouth daily, Starting Mon 5/6/2019, Normal      EPINEPHrine (EPIPEN 2-ARIEL) 0 3 mg/0 3 mL SOAJ EpiPen 2-Ariel 0 3 MG/0 3ML Injection Solution Auto-injector  INJECT 0 3ML INTRAMUSCULARLY AS DIRECTED  Quantity: 1;  Refills: 1      Toro Briceño ; Active  2 Solution Auto-injector Pen, Historical Med      niacin (NIASPAN) 500 mg CR tablet Take 3 tablets (1,500 mg total) by mouth daily at bedtime, Starting Mon 5/6/2019, Normal      nicotine (NICODERM CQ) 21 mg/24 hr TD 24 hr patch Place 1 patch on the skin every 24 hours, Starting Thu 3/14/2019, Normal      omega-3-acid ethyl esters (LOVAZA) 1 g capsule Take 2 capsules (2 g total) by mouth 2 (two) times a day, Starting Mon 5/6/2019, Normal      ondansetron (ZOFRAN-ODT) 4 mg disintegrating tablet Take 1 tablet (4 mg total) by mouth every 8 (eight) hours as needed for nausea or vomiting for up to 90 days, Starting Tue 8/6/2019, Until Mon 11/4/2019, Normal      oxyCODONE (OxyCONTIN) 10 mg 12 hr tablet Take 1 tablet (10 mg total) by mouth every 12 (twelve) hoursMax Daily Amount: 20 mg, Starting Wed 4/17/2019, Normal      oxyCODONE (ROXICODONE) 5 mg immediate release tablet Take 1 tablet (5 mg total) by mouth every 4 (four) hours as needed for moderate painMax Daily Amount: 30 mg, Starting Wed 4/17/2019, Normal      !! pancrelipase, Lip-Prot-Amyl, (CREON) 12,000 units capsule Take 12,000 units of lipase by mouth as needed for snacks, Starting Thu 4/4/2019, Normal      !!  Pancrelipase, Lip-Prot-Amyl, (CREON) 51886 units CPEP Take 1 capsule (36,000 Units total) by mouth 3 (three) times a day before meals, Starting Thu 4/4/2019, Normal      pantoprazole (PROTONIX) 40 mg tablet Take 1 tablet (40 mg total) by mouth 2 (two) times a day for 90 days, Starting Tue 8/6/2019, Until Mon 11/4/2019, Normal      pregabalin (LYRICA) 100 mg capsule Take 1 capsule (100 mg total) by mouth 3 (three) times a day for 30 days, Starting Tue 6/25/2019, Until Tue 8/20/2019, Normal      ranitidine (ZANTAC) 150 MG capsule Take 150 mg by mouth 2 (two) times a day, Historical Med      rosuvastatin (CRESTOR) 40 MG tablet Take 1 tablet (40 mg total) by mouth daily, Starting Mon 5/6/2019, Normal      traZODone (DESYREL) 50 mg tablet TAKE ONE TABLET BY MOUTH NIGHTLY AT BEDTIME , Normal       !! - Potential duplicate medications found  Please discuss with provider  No discharge procedures on file      ED Provider  Electronically Signed by           Lydia Lema DO  08/22/19 7313

## 2019-08-20 NOTE — OP NOTE
OPERATIVE REPORT  PATIENT NAME: Rosy Oviedo    :  1975  MRN: [id number]  Pt Location: [facility]    SURGERY DATE: 2019    Surgeon(s) and Role:     * Monse Perez MD - Primary    Preop Diagnosis:  Pancreatitis, unspecified pancreatitis type [K85 90]  Upper abdominal pain [R10 10]    Post-Op Diagnosis Codes:     * Pancreatitis, unspecified pancreatitis type [K85 90]     * Upper abdominal pain [R10 10]    Procedure(s) (LRB):  SPLANCHNIC NERVE BLOCK (Bilateral)    Specimen(s):  * No specimens in log *    Estimated Blood Loss:   Minimal    Drains:  * No LDAs found *    Anesthesia Type:   IV Sedation with Anesthesia    Operative Indications:  Pancreatitis, unspecified pancreatitis type [K85 90]  Upper abdominal pain [R10 10]    Operative Findings:  NONE    Complications:   None    Procedure and Technique: BILATERAL SPLANCHNIC NERVE BLOCK     MEDICATIONS INJECTED: 15 mL of bupivacaine 0 25% (7 5 mL per side) plus 20mg of dexamethasone (10mg per side) in 5cc of sterile saline and 6 mL of Omnipaque 240 contrast     OTHER LOCAL ANESTHETIC USED: 10 mL of 2% lidocaine     ESTIMATED BLOOD LOSS: None  COMPLICATIONS: None      TECHNIQUE: Time-out was taken to identify the correct patient, procedure and side prior to starting the procedure  With the patient lying prone, the area to be injected was widely prepped and draped in the usual sterile fashion using DuraPrep and a drape  The anatomical target site was determined using fluoroscopy by squaring off the superior endplate of E79, ipsilaterally obliquing the C-arm intensifier until the tip of the T12 transverse process was at the anterolateral border of the T12 vertebral body, and then moving the C-arm intensifier caudal to move the Transverse process out of the fluoroscopic target view  Local anesthetic 2% lidocaine 2cc's was given by raising a skin wheal and going down to the hub of the 27-gauge 1 25-inch needle   A 22-gauge 5-inch Quincke needle was advanced at the midbody of T12 to a point in the anterior one-third of the T12 vertebral body utilizing A-P and lateral intermittent fluoroscopy and also utilizing digital subtraction  6 mL of Omnipaque 240 contrast was injected to show unilateral spread along the anterolateral surface of the T12 vertebral body, and no vascular uptake       The above technique was then repeated for the opposite side with contrast injected again to show unilateral spread superior and inferior along the anterolateral T12 vertebral body  Medication was then injected slowly in 3 mL increments after negative aspirations of the needle to confirm the needle had not slipped intravascular       The procedure was completed without complications and was tolerated well  The patient was monitored after the procedure  The patient and his wife were given post-procedure and discharge instructions to follow at home  The patient was discharged in stable condition  The patient will call the office to make a follow up appointment in 6 weeks      I was present for the entire procedure    Patient Disposition:  PACU     SIGNATURE: Faviola Rubio MD  DATE: August 20, 2019  TIME: 8:59 AM

## 2019-08-20 NOTE — ANESTHESIA POSTPROCEDURE EVALUATION
Post-Op Assessment Note    CV Status:  Stable  Pain Score: 0    Pain management: adequate     Mental Status:  Awake   Hydration Status:  Stable   PONV Controlled:  None   Airway Patency:  Patent and adequate   Post Op Vitals Reviewed: Yes      Staff: CRNA           BP   111/63   Temp   97 9   Pulse  107   Resp   14   SpO2   97

## 2019-08-20 NOTE — H&P
History of Present Illness: The patient is a 53 y o  male who presents with complaints of abdominal pain  Patient Active Problem List   Diagnosis    Pancreatic lesion    Renal mass    Encounter for smoking cessation counseling    Leukocytosis    RBBB    Epigastric pain    Acute gastritis without hemorrhage    Abdominal pain    Abnormal transaminases    Acute on chronic pancreatitis (HCC)    Leukopenia    Hypertriglyceridemia    Chronic pain syndrome    Esophagitis    Other chronic pancreatitis (HCC)    Chronic gastritis without bleeding    Uncomplicated opioid dependence ([address] )    Long-term current use of opiate analgesic    GERD (gastroesophageal reflux disease)    Hyponatremia    Healthcare maintenance    Elevated blood pressure reading    Upper abdominal pain    Hypertension    Pancreatitis, unspecified pancreatitis type       Past Medical History:   Diagnosis Date    Asthma     Chronic pain disorder     GERD (gastroesophageal reflux disease)     Hyperlipidemia     Liver abscess     Meniscus tear     left knee work injury last assessed 08/24/2016    Pancreatitis     Pneumonia        Past Surgical History:   Procedure Laterality Date    CELIAC PLEXUS BLOCK Bilateral 10/29/2018    Procedure: SPLANCHNIC NERVE BLOCK;  Surgeon: Georgie Scales MD;  [facility];  Service: Pain Management     CELIAC PLEXUS BLOCK Bilateral 1/10/2019    Procedure: SPLANCHNIC NERVE BLOCK;  Surgeon: Georgie Scales MD;  [facility];  Service: Pain Management     CHOLECYSTECTOMY      ESOPHAGOGASTRODUODENOSCOPY N/A 11/16/2017    Procedure: ESOPHAGOGASTRODUODENOSCOPY (EGD); Surgeon: Mary Lou Renteria MD;  [facility]; Service: Gastroenterology    ESOPHAGOGASTRODUODENOSCOPY N/A 4/19/2018    Procedure: ESOPHAGOGASTRODUODENOSCOPY (EGD); Surgeon: Mare Cuellar MD;  [facility];   Service: Gastroenterology    ESOPHAGOGASTRODUODENOSCOPY N/A 5/10/2018    Procedure: ESOPHAGOGASTRODUODENOSCOPY (EGD); Surgeon: Erasmo Crespo MD;  [facility];   Service: Gastroenterology    IR TEMP HD CATH  2/28/2019    KNEE ARTHROSCOPY Bilateral     KNEE ARTHROSCOPY Right 2009    cibishino last assessed 08/24/2016    KNEE ARTHROSCOPY Right 07/01/2019    LIVER SURGERY      NERVE BLOCK Bilateral 5/29/2018    Procedure: SPLANCHNIC NERVE BLOCK;  Surgeon: Dameon Chavez MD;  [facility];  Service: Pain Management     PANCREAS SURGERY      stents    PANCREATIC CYST EXCISION      AR INJECT NERV [address] Bilateral 5/9/2017    Procedure: CELIAC PLEXUS BLOCK ;  Surgeon: Dameon Chavez MD;  [facility];  Service: Pain Management     AR INJECT NERV BLCK,CELIAC PLEXUS Bilateral 6/1/2017    Procedure: SPLANCHNIC NERVE BLOCK at T12;  Surgeon: Dameon Chavez MD;  [facility];  Service: Pain Management     AR INJECT NERV BLCK,CELIAC PLEXUS Bilateral 8/8/2017    Procedure: BILATERAL SPLANCHNIC NERVE BLOCK T12;  Surgeon: Dameon Chavez MD;  [facility];  Service: Pain Management     AR INJECT NERV BLCK,CELIAC PLEXUS Bilateral 4/18/2019    Procedure: BLOCK / INJECTION CELIAC PLEXUS;  Surgeon: Dameon Chavez MD;  [facility];  Service: Pain Management     AR INJECT NERV Benjamín Sprinkle Bilateral 12/28/2017    Procedure: SPLANCHNIC NERVE BLOCK;  Surgeon: Dameon Chavez MD;  [facility];  Service: Pain Management     AR LAP,CHOLECYSTECTOMY N/A 2/14/2017    Procedure: LAPAROSCOPIC CHOLECYSTECTOMY, IOC, POSSIBLE OPEN ;  Surgeon: Sirisha Ruffin MD;  [facility];  Service: General    ROTATOR CUFF REPAIR Right     SHOULDER ARTHROSCOPY      SHOULDER ARTHROSCOPY Right          Current Facility-Administered Medications:     lactated ringers infusion, 125 mL/hr, Intravenous, Continuous, Calixto Pozo MD, Last Rate: 125 mL/hr at 08/20/19 0731, 125 mL/hr at 08/20/19 0731    Facility-Administered Medications Ordered in Other Encounters:     oxyCODONE-acetaminophen (PERCOCET) 5-325 mg per tablet 2 tablet, 2 tablet, Oral, Once, Marquis Collette MD    Allergies   Allergen Reactions    Bee Venom Swelling       Physical Exam:   Vitals:    08/20/19 0727   BP: 157/75   Pulse: 86   Resp: 18   Temp: 97 5 °F (36 4 °C)   SpO2: 96%     General: Awake, Alert, Oriented x 3, Mood and affect appropriate  Respiratory: Respirations even and unlabored  Cardiovascular: Peripheral pulses intact; no edema  Musculoskeletal Exam: wnl    ASA Score: 3    Patient/Chart Verification  Patient ID Verified: Verbal, Armband  ID Band Applied: Yes  Consents Confirmed: Anesthesia  Beta Blocker given : No  Does Patient Have a Prosthetic Device/Implant: Yes, see comment(right shoulder)    Assessment: Chronic Pancreatitis     Plan: Bilateral Splanchnic Nerve Block

## 2019-08-21 ENCOUNTER — TELEPHONE (OUTPATIENT)
Dept: RADIOLOGY | Facility: CLINIC | Age: 44
End: 2019-08-21

## 2019-08-21 LAB
ATRIAL RATE: 78 BPM
P AXIS: 63 DEGREES
PR INTERVAL: 144 MS
QRS AXIS: 59 DEGREES
QRSD INTERVAL: 114 MS
QT INTERVAL: 402 MS
QTC INTERVAL: 458 MS
T WAVE AXIS: 56 DEGREES
VENTRICULAR RATE: 78 BPM

## 2019-08-21 PROCEDURE — 93010 ELECTROCARDIOGRAM REPORT: CPT | Performed by: INTERNAL MEDICINE

## 2019-08-21 NOTE — TELEPHONE ENCOUNTER
SERGIOI    S/w pt  States went to ED post OR procedure  BP 70/40 at that time  Hypotension resolved and /80 prior to leaving ED  Pt reports no pain relief as of yet  Pt aware that may take 1-2 weeks for full benefit of steroid  Pt to cb next week if no improvement   Scheduled f/u ov 10/9 with SI

## 2019-08-21 NOTE — TELEPHONE ENCOUNTER
Pt is s/p splanchnic block with SI 8/20    Per pt questionnaire, pt reports "Still have pain in pancreas and have pain in lower right back  Had to return to emergency room about 30 minutes after discharge  Turned grey, was dizzy, and my blood pressure had dropped  Finally came back up around 2:30 pm "    Will call pt to f/u

## 2019-08-25 LAB
BACTERIA BLD CULT: NORMAL
BACTERIA BLD CULT: NORMAL

## 2019-09-04 ENCOUNTER — TELEPHONE (OUTPATIENT)
Dept: GASTROENTEROLOGY | Facility: CLINIC | Age: 44
End: 2019-09-04

## 2019-09-04 NOTE — TELEPHONE ENCOUNTER
----- Message from Cinda Alexis sent at 9/4/2019 10:52 AM EDT -----  Regarding: Non-Urgent Medical Question  Contact: [phone]  I had the bloodwork done this morning  And also if you refill my lavaza and crestor  I use the [facility] pharmacy now on [facility]

## 2019-09-05 ENCOUNTER — TELEPHONE (OUTPATIENT)
Dept: FAMILY MEDICINE CLINIC | Facility: CLINIC | Age: 44
End: 2019-09-05

## 2019-09-05 ENCOUNTER — TELEPHONE (OUTPATIENT)
Dept: GASTROENTEROLOGY | Facility: CLINIC | Age: 44
End: 2019-09-05

## 2019-09-05 LAB
ALBUMIN SERPL-MCNC: 3.6 G/DL (ref 3.6–5.1)
ALBUMIN/GLOB SERPL: 1.5 (CALC) (ref 1–2.5)
ALP SERPL-CCNC: 266 U/L (ref 40–115)
ALT SERPL-CCNC: 224 U/L (ref 9–46)
AST SERPL-CCNC: 241 U/L (ref 10–40)
BILIRUB SERPL-MCNC: 1.1 MG/DL (ref 0.2–1.2)
BUN SERPL-MCNC: 5 MG/DL (ref 7–25)
BUN/CREAT SERPL: 7 (CALC) (ref 6–22)
CALCIUM SERPL-MCNC: 9 MG/DL (ref 8.6–10.3)
CHLORIDE SERPL-SCNC: 105 MMOL/L (ref 98–110)
CO2 SERPL-SCNC: 28 MMOL/L (ref 20–32)
CREAT SERPL-MCNC: 0.71 MG/DL (ref 0.6–1.35)
GLOBULIN SER CALC-MCNC: 2.4 G/DL (CALC) (ref 1.9–3.7)
GLUCOSE SERPL-MCNC: 113 MG/DL (ref 65–99)
POTASSIUM SERPL-SCNC: 3.7 MMOL/L (ref 3.5–5.3)
PROT SERPL-MCNC: 6 G/DL (ref 6.1–8.1)
SL AMB EGFR AFRICAN AMERICAN: 130 ML/MIN/1.73M2
SL AMB EGFR NON AFRICAN AMERICAN: 113 ML/MIN/1.73M2
SODIUM SERPL-SCNC: 138 MMOL/L (ref 135–146)
TRIGL SERPL-MCNC: 1353 MG/DL

## 2019-09-05 NOTE — TELEPHONE ENCOUNTER
Spoke to pt and advised, He is having abdominal pain on/off  He is going to start by calling his PCP and if his abdominal pain worsens go to the ED   Told pt to CB if he needs anything

## 2019-09-05 NOTE — TELEPHONE ENCOUNTER
----- Message from Kimberly Hamlin PA-C sent at 9/5/2019 11:46 AM EDT -----  Please call him and tell him his triglycerides are 1,353 so he needs to call his PCP and see how they want to adjust his medications  If he is having abdominal pain, he will need to go to the ER to get his triglycerides down quickly  His liver numbers are also elevated due to his triglycerides being high but they are stable compared to the past month  If his PCP doesn't know what to do with his medications, he will need to see an endocrinologist or special cardiologist for his triglycerides

## 2019-09-05 NOTE — TELEPHONE ENCOUNTER
Pt's wife called , pt triglycerides are elevated 1,353  Dr Burgos's office call to inform him he needs to go to the er or to f/u with you   Pts wife would like to know if you could order the plasmapheresis ? Which is usual what is done when he goes to the er    She was informed they have the machine needed in the office building next to the hospital

## 2019-09-06 ENCOUNTER — APPOINTMENT (EMERGENCY)
Dept: CT IMAGING | Facility: HOSPITAL | Age: 44
DRG: 439 | End: 2019-09-06
Payer: COMMERCIAL

## 2019-09-06 ENCOUNTER — HOSPITAL ENCOUNTER (INPATIENT)
Facility: HOSPITAL | Age: 44
LOS: 3 days | Discharge: HOME | DRG: 439 | End: 2019-09-09
Attending: EMERGENCY MEDICINE | Admitting: INTERNAL MEDICINE
Payer: COMMERCIAL

## 2019-09-06 DIAGNOSIS — E78.1 HYPERTRIGLYCERIDEMIA: ICD-10-CM

## 2019-09-06 DIAGNOSIS — K85.90 ACUTE PANCREATITIS: Primary | ICD-10-CM

## 2019-09-06 DIAGNOSIS — K86.1 ACUTE ON CHRONIC PANCREATITIS (HCC): ICD-10-CM

## 2019-09-06 DIAGNOSIS — K85.90 ACUTE ON CHRONIC PANCREATITIS (HCC): ICD-10-CM

## 2019-09-06 LAB
ALBUMIN SERPL BCP-MCNC: 3.2 G/DL (ref 3.5–5)
ALP SERPL-CCNC: 265 U/L (ref 46–116)
ALT SERPL W P-5'-P-CCNC: 184 U/L (ref 12–78)
ANION GAP SERPL CALCULATED.3IONS-SCNC: 11 MMOL/L (ref 4–13)
AST SERPL W P-5'-P-CCNC: 143 U/L (ref 5–45)
BASOPHILS # BLD AUTO: 0.02 THOUSANDS/ΜL (ref 0–0.1)
BASOPHILS NFR BLD AUTO: 0 % (ref 0–1)
BILIRUB SERPL-MCNC: 0.8 MG/DL (ref 0.2–1)
BILIRUB UR QL STRIP: NEGATIVE
BUN SERPL-MCNC: 10 MG/DL (ref 5–25)
CALCIUM SERPL-MCNC: 8.7 MG/DL (ref 8.3–10.1)
CHLORIDE SERPL-SCNC: 102 MMOL/L (ref 100–108)
CLARITY UR: CLEAR
CO2 SERPL-SCNC: 26 MMOL/L (ref 21–32)
COLOR UR: YELLOW
CREAT SERPL-MCNC: 0.8 MG/DL (ref 0.6–1.3)
EOSINOPHIL # BLD AUTO: 0.05 THOUSAND/ΜL (ref 0–0.61)
EOSINOPHIL NFR BLD AUTO: 1 % (ref 0–6)
ERYTHROCYTE [DISTWIDTH] IN BLOOD BY AUTOMATED COUNT: 15.7 % (ref 11.6–15.1)
GFR SERPL CREATININE-BSD FRML MDRD: 107 ML/MIN/1.73SQ M
GLUCOSE SERPL-MCNC: 135 MG/DL (ref 65–140)
GLUCOSE UR STRIP-MCNC: NEGATIVE MG/DL
HCT VFR BLD AUTO: 39.2 % (ref 36.5–49.3)
HGB BLD-MCNC: 13.1 G/DL (ref 12–17)
HGB UR QL STRIP.AUTO: NEGATIVE
IMM GRANULOCYTES # BLD AUTO: 0.04 THOUSAND/UL (ref 0–0.2)
IMM GRANULOCYTES NFR BLD AUTO: 1 % (ref 0–2)
KETONES UR STRIP-MCNC: NEGATIVE MG/DL
LACTATE SERPL-SCNC: 0.8 MMOL/L (ref 0.5–2)
LEUKOCYTE ESTERASE UR QL STRIP: NEGATIVE
LIPASE SERPL-CCNC: 281 U/L (ref 73–393)
LYMPHOCYTES # BLD AUTO: 1.51 THOUSANDS/ΜL (ref 0.6–4.47)
LYMPHOCYTES NFR BLD AUTO: 33 % (ref 14–44)
MCH RBC QN AUTO: 34.4 PG (ref 26.8–34.3)
MCHC RBC AUTO-ENTMCNC: 33.4 G/DL (ref 31.4–37.4)
MCV RBC AUTO: 103 FL (ref 82–98)
MONOCYTES # BLD AUTO: 0.35 THOUSAND/ΜL (ref 0.17–1.22)
MONOCYTES NFR BLD AUTO: 8 % (ref 4–12)
NEUTROPHILS # BLD AUTO: 2.58 THOUSANDS/ΜL (ref 1.85–7.62)
NEUTS SEG NFR BLD AUTO: 57 % (ref 43–75)
NITRITE UR QL STRIP: NEGATIVE
NRBC BLD AUTO-RTO: 0 /100 WBCS
PH UR STRIP.AUTO: 6 [PH]
PLATELET # BLD AUTO: 106 THOUSANDS/UL (ref 149–390)
PMV BLD AUTO: 11 FL (ref 8.9–12.7)
POTASSIUM SERPL-SCNC: 3.9 MMOL/L (ref 3.5–5.3)
PROT SERPL-MCNC: 6.8 G/DL (ref 6.4–8.2)
PROT UR STRIP-MCNC: NEGATIVE MG/DL
RBC # BLD AUTO: 3.81 MILLION/UL (ref 3.88–5.62)
SODIUM SERPL-SCNC: 139 MMOL/L (ref 136–145)
SP GR UR STRIP.AUTO: 1.01 (ref 1–1.03)
TRIGL SERPL-MCNC: 1436 MG/DL
UROBILINOGEN UR QL STRIP.AUTO: 0.2 E.U./DL
WBC # BLD AUTO: 4.55 THOUSAND/UL (ref 4.31–10.16)

## 2019-09-06 PROCEDURE — 96374 THER/PROPH/DIAG INJ IV PUSH: CPT

## 2019-09-06 PROCEDURE — 83690 ASSAY OF LIPASE: CPT | Performed by: EMERGENCY MEDICINE

## 2019-09-06 PROCEDURE — 80053 COMPREHEN METABOLIC PANEL: CPT | Performed by: EMERGENCY MEDICINE

## 2019-09-06 PROCEDURE — 81003 URINALYSIS AUTO W/O SCOPE: CPT | Performed by: INTERNAL MEDICINE

## 2019-09-06 PROCEDURE — 85025 COMPLETE CBC W/AUTO DIFF WBC: CPT | Performed by: EMERGENCY MEDICINE

## 2019-09-06 PROCEDURE — 83605 ASSAY OF LACTIC ACID: CPT | Performed by: EMERGENCY MEDICINE

## 2019-09-06 PROCEDURE — 99223 1ST HOSP IP/OBS HIGH 75: CPT | Performed by: INTERNAL MEDICINE

## 2019-09-06 PROCEDURE — 96376 TX/PRO/DX INJ SAME DRUG ADON: CPT

## 2019-09-06 PROCEDURE — 74177 CT ABD & PELVIS W/CONTRAST: CPT

## 2019-09-06 PROCEDURE — 99285 EMERGENCY DEPT VISIT HI MDM: CPT

## 2019-09-06 PROCEDURE — 96361 HYDRATE IV INFUSION ADD-ON: CPT

## 2019-09-06 PROCEDURE — 36415 COLL VENOUS BLD VENIPUNCTURE: CPT | Performed by: EMERGENCY MEDICINE

## 2019-09-06 PROCEDURE — 96375 TX/PRO/DX INJ NEW DRUG ADDON: CPT

## 2019-09-06 PROCEDURE — 84478 ASSAY OF TRIGLYCERIDES: CPT | Performed by: EMERGENCY MEDICINE

## 2019-09-06 PROCEDURE — 99285 EMERGENCY DEPT VISIT HI MDM: CPT | Performed by: EMERGENCY MEDICINE

## 2019-09-06 RX ORDER — CHLORAL HYDRATE 500 MG
2000 CAPSULE ORAL 2 TIMES DAILY
Status: DISCONTINUED | OUTPATIENT
Start: 2019-09-06 | End: 2019-09-09 | Stop reason: HOSPADM

## 2019-09-06 RX ORDER — PANTOPRAZOLE SODIUM 40 MG/1
40 TABLET, DELAYED RELEASE ORAL
Status: DISCONTINUED | OUTPATIENT
Start: 2019-09-07 | End: 2019-09-09 | Stop reason: HOSPADM

## 2019-09-06 RX ORDER — FAMOTIDINE 20 MG/1
20 TABLET, FILM COATED ORAL 2 TIMES DAILY
Status: DISCONTINUED | OUTPATIENT
Start: 2019-09-06 | End: 2019-09-09 | Stop reason: HOSPADM

## 2019-09-06 RX ORDER — HYDROMORPHONE HCL/PF 1 MG/ML
1 SYRINGE (ML) INJECTION ONCE
Status: COMPLETED | OUTPATIENT
Start: 2019-09-06 | End: 2019-09-06

## 2019-09-06 RX ORDER — NICOTINE 21 MG/24HR
1 PATCH, TRANSDERMAL 24 HOURS TRANSDERMAL DAILY
Status: DISCONTINUED | OUTPATIENT
Start: 2019-09-07 | End: 2019-09-09 | Stop reason: HOSPADM

## 2019-09-06 RX ORDER — ONDANSETRON 2 MG/ML
4 INJECTION INTRAMUSCULAR; INTRAVENOUS ONCE
Status: COMPLETED | OUTPATIENT
Start: 2019-09-06 | End: 2019-09-06

## 2019-09-06 RX ORDER — PREGABALIN 100 MG/1
100 CAPSULE ORAL 2 TIMES DAILY
Status: DISCONTINUED | OUTPATIENT
Start: 2019-09-06 | End: 2019-09-09 | Stop reason: HOSPADM

## 2019-09-06 RX ORDER — HYDROMORPHONE HCL/PF 1 MG/ML
1 SYRINGE (ML) INJECTION EVERY 4 HOURS PRN
Status: DISCONTINUED | OUTPATIENT
Start: 2019-09-06 | End: 2019-09-09 | Stop reason: HOSPADM

## 2019-09-06 RX ORDER — SODIUM CHLORIDE, SODIUM LACTATE, POTASSIUM CHLORIDE, CALCIUM CHLORIDE 600; 310; 30; 20 MG/100ML; MG/100ML; MG/100ML; MG/100ML
200 INJECTION, SOLUTION INTRAVENOUS CONTINUOUS
Status: DISCONTINUED | OUTPATIENT
Start: 2019-09-06 | End: 2019-09-07

## 2019-09-06 RX ORDER — ATORVASTATIN CALCIUM 40 MG/1
40 TABLET, FILM COATED ORAL
Status: DISCONTINUED | OUTPATIENT
Start: 2019-09-06 | End: 2019-09-09 | Stop reason: HOSPADM

## 2019-09-06 RX ORDER — NICOTINE 21 MG/24HR
1 PATCH, TRANSDERMAL 24 HOURS TRANSDERMAL EVERY 24 HOURS
Status: DISCONTINUED | OUTPATIENT
Start: 2019-09-06 | End: 2019-09-06

## 2019-09-06 RX ORDER — ALBUTEROL SULFATE 90 UG/1
2 AEROSOL, METERED RESPIRATORY (INHALATION) EVERY 6 HOURS PRN
Status: DISCONTINUED | OUTPATIENT
Start: 2019-09-06 | End: 2019-09-09 | Stop reason: HOSPADM

## 2019-09-06 RX ORDER — SODIUM CHLORIDE 9 MG/ML
1000 INJECTION, SOLUTION INTRAVENOUS ONCE
Status: COMPLETED | OUTPATIENT
Start: 2019-09-06 | End: 2019-09-06

## 2019-09-06 RX ORDER — FENOFIBRATE 145 MG/1
145 TABLET, COATED ORAL DAILY
Status: DISCONTINUED | OUTPATIENT
Start: 2019-09-07 | End: 2019-09-07

## 2019-09-06 RX ORDER — TRAZODONE HYDROCHLORIDE 50 MG/1
50 TABLET ORAL
Status: DISCONTINUED | OUTPATIENT
Start: 2019-09-06 | End: 2019-09-09 | Stop reason: HOSPADM

## 2019-09-06 RX ORDER — ONDANSETRON 2 MG/ML
4 INJECTION INTRAMUSCULAR; INTRAVENOUS EVERY 6 HOURS PRN
Status: DISCONTINUED | OUTPATIENT
Start: 2019-09-06 | End: 2019-09-09 | Stop reason: HOSPADM

## 2019-09-06 RX ADMIN — SODIUM CHLORIDE, SODIUM LACTATE, POTASSIUM CHLORIDE, AND CALCIUM CHLORIDE 200 ML/HR: .6; .31; .03; .02 INJECTION, SOLUTION INTRAVENOUS at 19:24

## 2019-09-06 RX ADMIN — ONDANSETRON 4 MG: 2 INJECTION INTRAMUSCULAR; INTRAVENOUS at 13:25

## 2019-09-06 RX ADMIN — HYDROMORPHONE HYDROCHLORIDE 1 MG: 1 INJECTION, SOLUTION INTRAMUSCULAR; INTRAVENOUS; SUBCUTANEOUS at 13:24

## 2019-09-06 RX ADMIN — MORPHINE SULFATE 2 MG: 2 INJECTION, SOLUTION INTRAMUSCULAR; INTRAVENOUS at 18:22

## 2019-09-06 RX ADMIN — FAMOTIDINE 20 MG: 20 TABLET ORAL at 19:27

## 2019-09-06 RX ADMIN — SODIUM CHLORIDE 1000 ML/HR: 0.9 INJECTION, SOLUTION INTRAVENOUS at 13:24

## 2019-09-06 RX ADMIN — ATORVASTATIN CALCIUM 40 MG: 40 TABLET, FILM COATED ORAL at 19:28

## 2019-09-06 RX ADMIN — IOHEXOL 100 ML: 350 INJECTION, SOLUTION INTRAVENOUS at 15:50

## 2019-09-06 RX ADMIN — PREGABALIN 100 MG: 100 CAPSULE ORAL at 19:27

## 2019-09-06 RX ADMIN — HYDROMORPHONE HYDROCHLORIDE 1 MG: 1 INJECTION, SOLUTION INTRAMUSCULAR; INTRAVENOUS; SUBCUTANEOUS at 15:24

## 2019-09-06 RX ADMIN — MORPHINE SULFATE 2 MG: 2 INJECTION, SOLUTION INTRAMUSCULAR; INTRAVENOUS at 21:29

## 2019-09-06 RX ADMIN — HYDROMORPHONE HYDROCHLORIDE 1 MG: 1 INJECTION, SOLUTION INTRAMUSCULAR; INTRAVENOUS; SUBCUTANEOUS at 19:57

## 2019-09-06 RX ADMIN — HYDROMORPHONE HYDROCHLORIDE 1 MG: 1 INJECTION, SOLUTION INTRAMUSCULAR; INTRAVENOUS; SUBCUTANEOUS at 15:57

## 2019-09-06 RX ADMIN — Medication 2000 MG: at 19:27

## 2019-09-06 RX ADMIN — TRAZODONE HYDROCHLORIDE 50 MG: 50 TABLET ORAL at 21:29

## 2019-09-06 NOTE — PLAN OF CARE
Problem: PAIN - ADULT  Goal: Verbalizes/displays adequate comfort level or baseline comfort level  Description  Interventions:  - Encourage patient to monitor pain and request assistance  - Assess pain using appropriate pain scale  - Administer analgesics based on type and severity of pain and evaluate response  - Implement non-pharmacological measures as appropriate and evaluate response  - Consider cultural and social influences on pain and pain management  - Notify physician/advanced practitioner if interventions unsuccessful or patient reports new pain  Outcome: Progressing     Problem: SAFETY ADULT  Goal: Patient will remain free of falls  Description  INTERVENTIONS:  - Assess patient frequently for physical needs  -  Identify cognitive and physical deficits and behaviors that affect risk of falls    -  [place] fall precautions as indicated by assessment   - Educate patient/family on patient safety including physical limitations  - Instruct patient to call for assistance with activity based on assessment  - Modify environment to reduce risk of injury  - Consider OT/PT consult to assist with strengthening/mobility  Outcome: Progressing

## 2019-09-06 NOTE — ASSESSMENT & PLAN NOTE
CT of the abdomen demonstrated no acute inflammatory process  Lipase level normal   Triglycerides are elevated  Patient does reports that his pain is like acute pancreatitis  He recently received splanchnic nerve block 2 weeks ago  Reportedly has not been taking niacin due to worsening LFTs    Patient reports compliance in taking Creon, Lovaza and Crestor    Continue IV fluid hydration  Pain control  NPO  GI consult

## 2019-09-06 NOTE — H&P
H&P- Huy Ricks 1975, 53 y o  male MRN: [id number]    Unit/Bed#: AYAAN Encounter: [id number]    Primary Care Provider: LUANA Pinon   Date and time admitted to hospital: 9/6/2019 12:35 PM        * Acute on chronic pancreatitis [facility])  Assessment & Plan  CT of the abdomen demonstrated no acute inflammatory process  Lipase level normal   Triglycerides are elevated  Patient does reports that his pain is like acute pancreatitis  He recently received splanchnic nerve block 2 weeks ago  Reportedly has not been taking niacin due to worsening LFTs  Patient reports compliance in taking Creon, Lovaza and Crestor    Continue IV fluid hydration  Pain control  NPO  GI consult    Hypertriglyceridemia  Assessment & Plan  Continue current medications  Reportedly he was told to discontinue niacin due to elevated LFTs    Hypertension  Assessment & Plan  Blood pressure is stable continue current medications    Uncomplicated opioid dependence ([facility] [address] )  Assessment & Plan  Prior history of opioid dependence  Currently off opioids as outpatient he gets no block and follows up closely with pain management  Will give IV narcotics for acute pancreatitis     Esophagitis  Assessment & Plan  Protonix and Zantac      VTE Prophylaxis: Encourage ambulation  / reason for no mechanical VTE prophylaxis Low risk   Code Status:  Full code  POLST: POLST form is not discussed and not completed at this time  Discussion with family:  Patient    Anticipated Length of Stay:  Patient will be admitted on an Inpatient basis with an anticipated length of stay of  greater 2 midnights  Justification for Hospital Stay:  Pancreatitis    Total Time for Visit, including Counseling / Coordination of Care: 30 minutes  Greater than 50% of this total time spent on direct patient counseling and coordination of care      Chief Complaint:   Abdominal pain    History of Present Illness:    Huy Ricks is a 53 y o  male with prior past medical history of chronic pancreatitis and multiple hospital admissions for acute pancreatitis believed secondary to hypertriglyceridemia who presents with abdominal pain, nausea ongoing for 3 days  A week ago he received splanchnic no block for his chronic pancreatitis  He states 2 days ago he had triglycerides checked which were markedly elevated, his GI doctor recommended that he go to ED but he did not go until today  His pain was acutely worse who presented to ED for further evaluation  No fevers or chills  No blood in the stool or hematemesis  Review of Systems:    Review of Systems   Constitutional: Negative for chills and fever  Respiratory: Negative for cough, chest tightness and shortness of breath  Gastrointestinal: Positive for abdominal pain and nausea  Negative for blood in stool, constipation, diarrhea and vomiting  Genitourinary: Negative for dysuria  Musculoskeletal: Negative for back pain  Neurological: Negative for dizziness, weakness and light-headedness  All other systems reviewed and are negative  Past Medical and Surgical History:     Past Medical History:   Diagnosis Date    Asthma     Chronic pain disorder     GERD (gastroesophageal reflux disease)     Hyperlipidemia     Liver abscess     Meniscus tear     left knee work injury last assessed 08/24/2016    Pancreatitis     Pneumonia        Past Surgical History:   Procedure Laterality Date    CELIAC PLEXUS BLOCK Bilateral 10/29/2018    Procedure: SPLANCHNIC NERVE BLOCK;  Surgeon: Bernardo Aguilar MD;  [facility];  Service: Pain Management     CELIAC PLEXUS BLOCK Bilateral 1/10/2019    Procedure: SPLANCHNIC NERVE BLOCK;  Surgeon: Bernardo Aguilar MD;  [facility];  Service: Pain Management     CHOLECYSTECTOMY      ESOPHAGOGASTRODUODENOSCOPY N/A 11/16/2017    Procedure: ESOPHAGOGASTRODUODENOSCOPY (EGD); Surgeon: Angelita Ramirez MD;  [facility];   Service: Gastroenterology    ESOPHAGOGASTRODUODENOSCOPY N/A 4/19/2018    Procedure: ESOPHAGOGASTRODUODENOSCOPY (EGD); Surgeon: Sola Cohen MD;  [facility]; Service: Gastroenterology    ESOPHAGOGASTRODUODENOSCOPY N/A 5/10/2018    Procedure: ESOPHAGOGASTRODUODENOSCOPY (EGD); Surgeon: Staci Bourgeois MD;  [facility];   Service: Gastroenterology    IR TEMP HD CATH  2/28/2019    KNEE ARTHROSCOPY Bilateral     KNEE ARTHROSCOPY Right 2009    cibishino last assessed 08/24/2016    KNEE ARTHROSCOPY Right 07/01/2019    LIVER SURGERY      NERVE BLOCK Bilateral 5/29/2018    Procedure: SPLANCHNIC NERVE BLOCK;  Surgeon: Deirdre Tong MD;  [facility];  Service: Pain Management     PANCREAS SURGERY      stents    PANCREATIC CYST EXCISION      MS INJECT NERV BLCK,CELIAC PLEXUS Bilateral 5/9/2017    Procedure: CELIAC PLEXUS BLOCK ;  Surgeon: Deirdre Tong MD;  [facility];  Service: Pain Management     MS INJECT NERV BLCK,CELIAC PLEXUS Bilateral 6/1/2017    Procedure: SPLANCHNIC NERVE BLOCK at T12;  Surgeon: Deirdre Tong MD;  [facility];  Service: Pain Management     MS INJECT NERV BLCK,CELIAC PLEXUS Bilateral 8/8/2017    Procedure: BILATERAL SPLANCHNIC NERVE BLOCK T12;  Surgeon: Deirdre Tong MD;  [facility];  Service: Pain Management     MS INJECT NERV BLCK,CELIAC PLEXUS Bilateral 4/18/2019    Procedure: BLOCK / INJECTION CELIAC PLEXUS;  Surgeon: Deirdre Tong MD;  [facility];  Service: Pain Management     MS INJECT NERV BLCK,CELIAC PLEXUS Bilateral 8/20/2019    Procedure: SPLANCHNIC NERVE BLOCK;  Surgeon: Deirdre Tong MD;  [facility];  Service: Pain Management     MS INJECT NERV Archana Billing Bilateral 12/28/2017    Procedure: SPLANCHNIC NERVE BLOCK;  Surgeon: Deirdre Tong MD;  [facility];  Service: Pain Management     MS LAP,CHOLECYSTECTOMY N/A 2/14/2017    Procedure: LAPAROSCOPIC CHOLECYSTECTOMY, IOC, POSSIBLE OPEN ;  Surgeon: Dorina Dickerson MD;  [facility];  Service: General    ROTATOR CUFF REPAIR Right     SHOULDER ARTHROSCOPY      SHOULDER ARTHROSCOPY Right        Meds/Allergies:    Prior to Admission medications    Medication Sig Start Date End Date Taking? Authorizing Provider   albuterol (VENTOLIN HFA) 90 mcg/act inhaler Inhale 2 puffs every 6 (six) hours as needed for wheezing Prn 1/10/19 1/10/20  LUANA Sanches   Choline Fenofibrate (FENOFIBRIC ACID) 135 MG CPDR Take 1 capsule (135 mg total) by mouth daily 5/6/19   LUANA Sanches   EPINEPHrine (EPIPEN 2-LARRY) 0 3 mg/0 3 mL SOAJ EpiPen 2-Larry 0 3 MG/0 3ML Injection Solution Auto-injector  INJECT 0 3ML INTRAMUSCULARLY AS DIRECTED     Quantity: 1;  Refills: 1      Alan CRAFT, Courtney Oviedo ; Active  2 Solution Auto-injector Pen    Historical Provider, MD   niacin (NIASPAN) 500 mg CR tablet Take 3 tablets (1,500 mg total) by mouth daily at bedtime 5/6/19   LUANA Sanches   nicotine (NICODERM CQ) 21 mg/24 hr TD 24 hr patch Place 1 patch on the skin every 24 hours 3/14/19   LUANA Sanches   omega-3-acid ethyl esters (LOVAZA) 1 g capsule Take 2 capsules (2 g total) by mouth 2 (two) times a day 5/6/19   LUANA Sanches   ondansetron (ZOFRAN-ODT) 4 mg disintegrating tablet Take 1 tablet (4 mg total) by mouth every 8 (eight) hours as needed for nausea or vomiting for up to 90 days 8/6/19 11/4/19  LUANA Sanches   oxyCODONE (OxyCONTIN) 10 mg 12 hr tablet Take 1 tablet (10 mg total) by mouth every 12 (twelve) hoursMax Daily Amount: 20 mg 4/17/19   Alta Willett PA-C   oxyCODONE (ROXICODONE) 5 mg immediate release tablet Take 1 tablet (5 mg total) by mouth every 4 (four) hours as needed for moderate painMax Daily Amount: 30 mg 4/17/19   Alta Willett PA-C   pancrelipase, Lip-Prot-Amyl, (CREON) 12,000 units capsule Take 12,000 units of lipase by mouth as needed for snacks 4/4/19   Dinora Mensah PA-C   Pancrelipase, Lip-Prot-Amyl, (CREON) 48970 units CPEP Take 1 capsule (36,000 Units total) by mouth 3 (three) times a day before meals 4/4/19   Dinora Mensah PA-C   pantoprazole (PROTONIX) 40 mg tablet Take 1 tablet (40 mg total) by mouth 2 (two) times a day for 90 days 8/6/19 11/4/19  LUANA Hanna   pregabalin (LYRICA) 100 mg capsule Take 1 capsule (100 mg total) by mouth 3 (three) times a day for 30 days 6/25/19 8/20/19  Jany Skinner MD   ranitidine (ZANTAC) 150 MG capsule Take 150 mg by mouth 2 (two) times a day    Historical Provider, MD   rosuvastatin (CRESTOR) 40 MG tablet Take 1 tablet (40 mg total) by mouth daily 5/6/19   LUANA Hanna   traZODone (DESYREL) 50 mg tablet TAKE ONE TABLET BY MOUTH NIGHTLY AT BEDTIME  8/8/19   LUANA Hanna     I have reviewed home medications with patient personally  Allergies:    Allergies   Allergen Reactions    Bee Venom Swelling       Social History:     Marital Status: /Civil Union   Occupation:   Patient Pre-hospital Living Situation:  Patient Pre-hospital Level of Mobility  Patient Pre-hospital Diet Restrictions:  Substance Use History:   Social History     Substance and Sexual Activity   Alcohol Use Yes    Alcohol/week: 1 0 standard drinks    Types: 1 Cans of beer per week    Drinks per session: 3 or 4    Comment: rarely, occasionally, history of no alcohol use per allscripts     Social History     Tobacco Use   Smoking Status Current Every Day Smoker    Packs/day: 0 50    Years: 20 00    Pack years: 10 00   Smokeless Tobacco Never Used     Social History     Substance and Sexual Activity   Drug Use No       Family History:    Family History   Problem Relation Age of Onset    Cirrhosis Mother     Heart disease Other         cardiac disorder    Cancer Other        Physical Exam:     Vitals:   Blood Pressure: 152/84 (09/06/19 1645)  Pulse: 75 (09/06/19 1645)  Temperature: 98 5 °F (36 9 °C) (09/06/19 1227)  Temp Source: Oral (09/06/19 1227)  Respirations: 18 (09/06/19 1645)  SpO2: 95 % (09/06/19 1645)    Physical Exam Constitutional: He is oriented to person, place, and time  He appears well-developed and well-nourished  Eyes: Pupils are equal, round, and reactive to light  EOM are normal    Neck: Normal range of motion  Neck supple  Cardiovascular: Normal rate and regular rhythm  Pulmonary/Chest: Effort normal and breath sounds normal    Abdominal: Soft  Bowel sounds are normal  There is tenderness  Musculoskeletal: Normal range of motion  Neurological: He is alert and oriented to person, place, and time  Skin: Skin is warm and dry  Additional Data:     Lab Results: I have personally reviewed pertinent reports  and I have personally reviewed pertinent films in PACS    Results from last 7 days   Lab Units 09/06/19  1319   WBC Thousand/uL 4 55   HEMOGLOBIN g/dL 13 1   HEMATOCRIT % 39 2   PLATELETS Thousands/uL 106*   NEUTROS PCT % 57   LYMPHS PCT % 33   MONOS PCT % 8   EOS PCT % 1     Results from last 7 days   Lab Units 09/06/19  1319   SODIUM mmol/L 139   POTASSIUM mmol/L 3 9   CHLORIDE mmol/L 102   CO2 mmol/L 26   BUN mg/dL 10   CREATININE mg/dL 0 80   ANION GAP mmol/L 11   CALCIUM mg/dL 8 7   ALBUMIN g/dL 3 2*   TOTAL BILIRUBIN mg/dL 0 80   ALK PHOS U/L 265*   ALT U/L 184*   AST U/L 143*   GLUCOSE RANDOM mg/dL 135                 Results from last 7 days   Lab Units 09/06/19  1319   LACTIC ACID mmol/L 0 8       Imaging: I have personally reviewed pertinent reports  CT abdomen pelvis with contrast   Final Result by Julian Camarillo MD (09/06 2079)      No acute inflammatory process  Stable mild splenomegaly  Stable hepatomegaly with fatty infiltration  Workstation performed: [id number]             EKG, Pathology, and Other Studies Reviewed on Admission:   · EKG:     Allscripts / Epic Records Reviewed: Yes     ** Please Note: This note has been constructed using a voice recognition system   **

## 2019-09-06 NOTE — ASSESSMENT & PLAN NOTE
Prior history of opioid dependence    Currently off opioids as outpatient he gets no block and follows up closely with pain management  Will give IV narcotics for acute pancreatitis

## 2019-09-06 NOTE — ED PROVIDER NOTES
History  Chief Complaint   Patient presents with    Abdominal Pain     pt with hx of chronic pancreatitis reports increassed pain since beginning of week, instructed by PCP to be evaluated in the ED     HPI  17-year-old male past medical history of chronic pancreatitis secondary to hypertriglyceridemia presents with abdominal pain in the left upper quadrant that radiates to epigastric area for the last approximately 3 days which has worsened today  Nothing worsens or improves pain  He states 2 days ago he had his triglycerides checked which were markedly elevated, his GI doctor recommended that he go to the ED but he put it off until today  He has required plasma exchange for hypertriglyceridemia previously most recently in April of this year  He had a splanchnic nerve block bilateral low done by pain management 8/20/19  Prior to Admission Medications   Prescriptions Last Dose Informant Patient Reported? Taking? Choline Fenofibrate (FENOFIBRIC ACID) 135 MG CPDR   No No   Sig: Take 1 capsule (135 mg total) by mouth daily   EPINEPHrine (EPIPEN 2-ARIEL) 0 3 mg/0 3 mL SOAJ  Self Yes No   Sig: EpiPen 2-Ariel 0 3 MG/0 3ML Injection Solution Auto-injector  INJECT 0 3ML INTRAMUSCULARLY AS DIRECTED     Quantity: 1;  Refills: 1      Toro Briceño ; Active  2 Solution Auto-injector Pen   Pancrelipase, Lip-Prot-Amyl, (CREON) 71217 units CPEP   No No   Sig: Take 1 capsule (36,000 Units total) by mouth 3 (three) times a day before meals   albuterol (VENTOLIN HFA) 90 mcg/act inhaler  Self No No   Sig: Inhale 2 puffs every 6 (six) hours as needed for wheezing Prn   niacin (NIASPAN) 500 mg CR tablet   No No   Sig: Take 3 tablets (1,500 mg total) by mouth daily at bedtime   nicotine (NICODERM CQ) 21 mg/24 hr TD 24 hr patch  Self No No   Sig: Place 1 patch on the skin every 24 hours   omega-3-acid ethyl esters (LOVAZA) 1 g capsule   No No   Sig: Take 2 capsules (2 g total) by mouth 2 (two) times a day   ondansetron (ZOFRAN-ODT) 4 mg disintegrating tablet   No No   Sig: Take 1 tablet (4 mg total) by mouth every 8 (eight) hours as needed for nausea or vomiting for up to 90 days   oxyCODONE (OxyCONTIN) 10 mg 12 hr tablet   No No   Sig: Take 1 tablet (10 mg total) by mouth every 12 (twelve) hoursMax Daily Amount: 20 mg   oxyCODONE (ROXICODONE) 5 mg immediate release tablet   No No   Sig: Take 1 tablet (5 mg total) by mouth every 4 (four) hours as needed for moderate painMax Daily Amount: 30 mg   pancrelipase, Lip-Prot-Amyl, (CREON) 12,000 units capsule   No No   Sig: Take 12,000 units of lipase by mouth as needed for snacks   pantoprazole (PROTONIX) 40 mg tablet   No No   Sig: Take 1 tablet (40 mg total) by mouth 2 (two) times a day for 90 days   pregabalin (LYRICA) 100 mg capsule   No No   Sig: Take 1 capsule (100 mg total) by mouth 3 (three) times a day for 30 days   ranitidine (ZANTAC) 150 MG capsule  Self Yes No   Sig: Take 150 mg by mouth 2 (two) times a day   rosuvastatin (CRESTOR) 40 MG tablet   No No   Sig: Take 1 tablet (40 mg total) by mouth daily   traZODone (DESYREL) 50 mg tablet   No No   Sig: TAKE ONE TABLET BY MOUTH NIGHTLY AT BEDTIME       Facility-Administered Medications: None       Past Medical History:   Diagnosis Date    Asthma     Chronic pain disorder     GERD (gastroesophageal reflux disease)     Hyperlipidemia     Liver abscess     Meniscus tear     left knee work injury last assessed 08/24/2016    Pancreatitis     Pneumonia        Past Surgical History:   Procedure Laterality Date    CELIAC PLEXUS BLOCK Bilateral 10/29/2018    Procedure: SPLANCHNIC NERVE BLOCK;  Surgeon: Nicole Bone MD;  [facility];  Service: Pain Management     CELIAC PLEXUS BLOCK Bilateral 1/10/2019    Procedure: SPLANCHNIC NERVE BLOCK;  Surgeon: Nicole Bone MD;  [facility];  Service: Pain Management     CHOLECYSTECTOMY      ESOPHAGOGASTRODUODENOSCOPY N/A 11/16/2017    Procedure: ESOPHAGOGASTRODUODENOSCOPY (EGD); Surgeon: Ye Byrne MD;  [facility]; Service: Gastroenterology    ESOPHAGOGASTRODUODENOSCOPY N/A 4/19/2018    Procedure: ESOPHAGOGASTRODUODENOSCOPY (EGD); Surgeon: Tyrell Grullon MD;  [facility]; Service: Gastroenterology    ESOPHAGOGASTRODUODENOSCOPY N/A 5/10/2018    Procedure: ESOPHAGOGASTRODUODENOSCOPY (EGD); Surgeon: Kareem Quintanilla MD;  [facility];   Service: Gastroenterology    IR TEMP HD CATH  2/28/2019    KNEE ARTHROSCOPY Bilateral     KNEE ARTHROSCOPY Right 2009    cibishino last assessed 08/24/2016    KNEE ARTHROSCOPY Right 07/01/2019    LIVER SURGERY      NERVE BLOCK Bilateral 5/29/2018    Procedure: SPLANCHNIC NERVE BLOCK;  Surgeon: Dorita Gracia MD;  [facility];  Service: Pain Management     PANCREAS SURGERY      stents    PANCREATIC CYST EXCISION      OR INJECT NERV BLCK,CELIAC PLEXUS Bilateral 5/9/2017    Procedure: CELIAC PLEXUS BLOCK ;  Surgeon: Dorita Gracia MD;  [facility];  Service: Pain Management     OR INJECT NERV BLCK,CELIAC PLEXUS Bilateral 6/1/2017    Procedure: SPLANCHNIC NERVE BLOCK at T12;  Surgeon: Dorita Gracia MD;  [facility];  Service: Pain Management     OR INJECT NERV BLCK,CELIAC PLEXUS Bilateral 8/8/2017    Procedure: BILATERAL SPLANCHNIC NERVE BLOCK T12;  Surgeon: Dorita Gracia MD;  [facility];  Service: Pain Management     OR INJECT NERV BLCK,CELIAC PLEXUS Bilateral 4/18/2019    Procedure: BLOCK / INJECTION CELIAC PLEXUS;  Surgeon: Dorita Gracia MD;  [facility];  Service: Pain Management     OR INJECT NERV BLCK,CELIAC PLEXUS Bilateral 8/20/2019    Procedure: SPLANCHNIC NERVE BLOCK;  Surgeon: Dorita Gracia MD;  [facility];  Service: Pain Management     OR INJECT NERV BLCK,PARAVERT SYMPATH Bilateral 12/28/2017    Procedure: SPLANCHNIC NERVE BLOCK;  Surgeon: Dorita Gracia MD;  [facility];  Service: Pain Management     OR LAP,CHOLECYSTECTOMY N/A 2/14/2017    Procedure: LAPAROSCOPIC CHOLECYSTECTOMY, IOC, POSSIBLE OPEN ;  Surgeon: Greg Gonzalez MD;  [facility];  Service: General    ROTATOR CUFF REPAIR Right     SHOULDER ARTHROSCOPY      SHOULDER ARTHROSCOPY Right        Family History   Problem Relation Age of Onset    Cirrhosis Mother     Heart disease Other         cardiac disorder    Cancer Other      I have reviewed and agree with the history as documented  Social History     Tobacco Use    Smoking status: Current Every Day Smoker     Packs/day: 0 50     Years: 20 00     Pack years: 10 00    Smokeless tobacco: Never Used   Substance Use Topics    Alcohol use: Yes     Alcohol/week: 1 0 standard drinks     Types: 1 Cans of beer per week     Drinks per session: 3 or 4     Comment: rarely, occasionally, history of no alcohol use per allscripts    Drug use: No        Review of Systems   Constitutional: Negative for chills and fever  HENT: Negative for dental problem and ear pain  Eyes: Negative for pain and redness  Respiratory: Negative for cough and shortness of breath  Cardiovascular: Negative for chest pain and palpitations  Gastrointestinal: Positive for abdominal pain  Negative for nausea  Endocrine: Negative for polydipsia and polyphagia  Genitourinary: Negative for dysuria and frequency  Musculoskeletal: Negative for arthralgias and joint swelling  Skin: Negative for color change and rash  Neurological: Negative for dizziness and headaches  Psychiatric/Behavioral: Negative for behavioral problems and confusion  All other systems reviewed and are negative  Physical Exam  Physical Exam   Constitutional: He is oriented to person, place, and time  He appears well-developed and well-nourished  No distress  HENT:   Head: Atraumatic  Right Ear: External ear normal    Left Ear: External ear normal    Nose: Nose normal    Eyes: Pupils are equal, round, and reactive to light  Conjunctivae and EOM are normal    Neck: Normal range of motion  Neck supple  No JVD present  Cardiovascular: Normal rate, regular rhythm and normal heart sounds  No murmur heard  Pulmonary/Chest: Effort normal and breath sounds normal  No respiratory distress  He has no wheezes  Abdominal: Soft  Bowel sounds are normal  He exhibits no distension  There is tenderness  TTP LUQ   Musculoskeletal: Normal range of motion  He exhibits no edema  Neurological: He is alert and oriented to person, place, and time  No cranial nerve deficit  Skin: Skin is warm and dry  Capillary refill takes less than 2 seconds  He is not diaphoretic  Psychiatric: He has a normal mood and affect  His behavior is normal    Nursing note and vitals reviewed        Vital Signs  ED Triage Vitals [09/06/19 1227]   Temperature Pulse Respirations Blood Pressure SpO2   98 5 °F (36 9 °C) 93 20 (!) 182/105 95 %      Temp Source Heart Rate Source Patient Position - Orthostatic VS BP Location FiO2 (%)   Oral Monitor Sitting Left arm --      Pain Score       6           Vitals:    09/06/19 1227 09/06/19 1400 09/06/19 1524   BP: (!) 182/105 159/99 153/96   Pulse: 93 89 76   Patient Position - Orthostatic VS: Sitting Lying Lying         Visual Acuity      ED Medications  Medications   sodium chloride 0 9 % infusion (0 mL/hr Intravenous Stopped 9/6/19 1522)   ondansetron (ZOFRAN) injection 4 mg (4 mg Intravenous Given 9/6/19 1325)   HYDROmorphone (DILAUDID) injection 1 mg (1 mg Intravenous Given 9/6/19 1324)   HYDROmorphone (DILAUDID) injection 1 mg (1 mg Intravenous Given 9/6/19 1524)   iohexol (OMNIPAQUE) 350 MG/ML injection (MULTI-DOSE) 100 mL (100 mL Intravenous Given 9/6/19 1550)   HYDROmorphone (DILAUDID) injection 1 mg (1 mg Intravenous Given 9/6/19 1557)       Diagnostic Studies  Results Reviewed     Procedure Component Value Units Date/Time    Comprehensive metabolic panel [[id number]]  (Abnormal) Collected:  09/06/19 1319    Lab Status: Final result Specimen:  Blood from Arm, Right Updated:  09/06/19 1504     Sodium 139 mmol/L      Potassium 3 9 mmol/L      Chloride 102 mmol/L      CO2 26 mmol/L      ANION GAP 11 mmol/L      BUN 10 mg/dL      Creatinine 0 80 mg/dL      Glucose 135 mg/dL      Calcium 8 7 mg/dL       U/L       U/L      Alkaline Phosphatase 265 U/L      Total Protein 6 8 g/dL      Albumin 3 2 g/dL      Total Bilirubin 0 80 mg/dL      eGFR 107 ml/min/1 73sq m     Narrative:       [place] guidelines for Chronic Kidney Disease (CKD):     Stage 1 with normal or high GFR (GFR > 90 mL/min/1 73 square meters)    Stage 2 Mild CKD (GFR = 60-89 mL/min/1 73 square meters)    Stage 3A Moderate CKD (GFR = 45-59 mL/min/1 73 square meters)    Stage 3B Moderate CKD (GFR = 30-44 mL/min/1 73 square meters)    Stage 4 Severe CKD (GFR = 15-29 mL/min/1 73 square meters)    Stage 5 End Stage CKD (GFR <15 mL/min/1 73 square meters)  Note: GFR calculation is accurate only with a steady state creatinine    Lipase [[id number]]  (Normal) Collected:  09/06/19 1319    Lab Status:  Final result Specimen:  Blood from Arm, Right Updated:  09/06/19 1438     Lipase 281 u/L     Triglycerides [[id number]]  (Abnormal) Collected:  09/06/19 1319    Lab Status:  Final result Specimen:  Blood from Arm, Right Updated:  09/06/19 1438     Triglycerides 1,436 mg/dL     Lactic acid, plasma [[id number]]  (Normal) Collected:  09/06/19 1319    Lab Status:  Final result Specimen:  Blood from Arm, Right Updated:  09/06/19 1437     LACTIC ACID 0 8 mmol/L     Narrative:       Result may be elevated if tourniquet was used during collection      CBC and differential [[id number]]  (Abnormal) Collected:  09/06/19 1319    Lab Status:  Final result Specimen:  Blood from Arm, Right Updated:  09/06/19 1344     WBC 4 55 Thousand/uL      RBC 3 81 Million/uL      Hemoglobin 13 1 g/dL      Hematocrit 39 2 %       fL      MCH 34 4 pg      MCHC 33 4 g/dL      RDW 15 7 %      MPV 11 0 fL      Platelets 411 Thousands/uL      nRBC 0 /100 WBCs      Neutrophils Relative 57 %      Immat GRANS % 1 %      Lymphocytes Relative 33 %      Monocytes Relative 8 %      Eosinophils Relative 1 %      Basophils Relative 0 %      Neutrophils Absolute 2 58 Thousands/µL      Immature Grans Absolute 0 04 Thousand/uL      Lymphocytes Absolute 1 51 Thousands/µL      Monocytes Absolute 0 35 Thousand/µL      Eosinophils Absolute 0 05 Thousand/µL      Basophils Absolute 0 02 Thousands/µL     UA w Reflex to Microscopic w Reflex to Culture [[id number]]     Lab Status:  No result Specimen:  Urine                  CT abdomen pelvis with contrast   Final Result by Marleen Beatty MD (09/06 1559)      No acute inflammatory process  Stable mild splenomegaly  Stable hepatomegaly with fatty infiltration  Workstation performed: [id number]                    Procedures  Procedures       ED Course                               MDM  History of chronic pancreatitis 2/2 hypertriglyceridemia, triglycerides markedly elevated, ct unremarkable, given symptoms and history with the acutely elevated TG, will admit to hospitalist  [facility] contracted to provide plasmapheresis at any time, [phone]  Disposition  Final diagnoses:   Acute pancreatitis   Hypertriglyceridemia     Time reflects when diagnosis was documented in both MDM as applicable and the Disposition within this note     Time User Action Codes Description Comment    9/6/2019  4:10 PM Dapbhavin Shipley Add [K85 90] Acute pancreatitis     9/6/2019  4:13 PM Ingrid Wilson [E78 1] Hypertriglyceridemia       ED Disposition     ED Disposition Condition Date/Time Comment    Admit Stable Fri Sep 6, 2019  4:11 PM Case was discussed with Dr Cristina White and the patient's admission status was agreed to be Admission Status: inpatient status to the service of Dr Cristina White           Follow-up Information    None         Patient's Medications   Discharge Prescriptions    No medications on file     No discharge procedures on file      ED Provider  Electronically Signed by           Leighton Menendez MD  09/06/19 5564

## 2019-09-06 NOTE — ED NOTES
1  CC  Abdominal pain    2  Is this admission due to an injury? no    3  Orientation status A&Oxs 4     4  Abnormal labs/abnormal focused assessment/abnormal vitals UIJ=092, ALT=184, Triglycerides=1,436    5  Medications/ drips    6  Last time narcotics were given/ pain medication 1mg IVP Diluadid@ 15:57, for a total of 3mg     7  IV lines/drains/ etc  20RAC, Ultrasound guided  8  Isolation status none    9  Skin WDL    10  Ambulation status Ambulatory    11   ED phone # [phone]     Jenise Wong RN  09/06/19 1244

## 2019-09-07 LAB
ALBUMIN SERPL BCP-MCNC: 2.8 G/DL (ref 3.5–5)
ALP SERPL-CCNC: 147 U/L (ref 46–116)
ALT SERPL W P-5'-P-CCNC: 123 U/L (ref 12–78)
ANION GAP SERPL CALCULATED.3IONS-SCNC: 8 MMOL/L (ref 4–13)
AST SERPL W P-5'-P-CCNC: 83 U/L (ref 5–45)
BILIRUB SERPL-MCNC: 0.8 MG/DL (ref 0.2–1)
BUN SERPL-MCNC: 7 MG/DL (ref 5–25)
CALCIUM SERPL-MCNC: 8.4 MG/DL (ref 8.3–10.1)
CHLORIDE SERPL-SCNC: 101 MMOL/L (ref 100–108)
CO2 SERPL-SCNC: 28 MMOL/L (ref 21–32)
CREAT SERPL-MCNC: 0.7 MG/DL (ref 0.6–1.3)
ERYTHROCYTE [DISTWIDTH] IN BLOOD BY AUTOMATED COUNT: 15.4 % (ref 11.6–15.1)
GFR SERPL CREATININE-BSD FRML MDRD: 113 ML/MIN/1.73SQ M
GLUCOSE SERPL-MCNC: 100 MG/DL (ref 65–140)
GLUCOSE SERPL-MCNC: 106 MG/DL (ref 65–140)
GLUCOSE SERPL-MCNC: 112 MG/DL (ref 65–140)
GLUCOSE SERPL-MCNC: 126 MG/DL (ref 65–140)
GLUCOSE SERPL-MCNC: 157 MG/DL (ref 65–140)
GLUCOSE SERPL-MCNC: 209 MG/DL (ref 65–140)
HCT VFR BLD AUTO: 35.2 % (ref 36.5–49.3)
HGB BLD-MCNC: 11.6 G/DL (ref 12–17)
MAGNESIUM SERPL-MCNC: 1.2 MG/DL (ref 1.6–2.6)
MCH RBC QN AUTO: 33.7 PG (ref 26.8–34.3)
MCHC RBC AUTO-ENTMCNC: 33 G/DL (ref 31.4–37.4)
MCV RBC AUTO: 102 FL (ref 82–98)
PLATELET # BLD AUTO: 81 THOUSANDS/UL (ref 149–390)
PMV BLD AUTO: 11 FL (ref 8.9–12.7)
POTASSIUM SERPL-SCNC: 3.2 MMOL/L (ref 3.5–5.3)
PROT SERPL-MCNC: 5.8 G/DL (ref 6.4–8.2)
RBC # BLD AUTO: 3.44 MILLION/UL (ref 3.88–5.62)
SODIUM SERPL-SCNC: 137 MMOL/L (ref 136–145)
TRIGL SERPL-MCNC: 793 MG/DL
WBC # BLD AUTO: 3.97 THOUSAND/UL (ref 4.31–10.16)

## 2019-09-07 PROCEDURE — 82948 REAGENT STRIP/BLOOD GLUCOSE: CPT

## 2019-09-07 PROCEDURE — 80053 COMPREHEN METABOLIC PANEL: CPT | Performed by: INTERNAL MEDICINE

## 2019-09-07 PROCEDURE — 99254 IP/OBS CNSLTJ NEW/EST MOD 60: CPT | Performed by: INTERNAL MEDICINE

## 2019-09-07 PROCEDURE — 84478 ASSAY OF TRIGLYCERIDES: CPT | Performed by: INTERNAL MEDICINE

## 2019-09-07 PROCEDURE — 83735 ASSAY OF MAGNESIUM: CPT | Performed by: INTERNAL MEDICINE

## 2019-09-07 PROCEDURE — 85027 COMPLETE CBC AUTOMATED: CPT | Performed by: INTERNAL MEDICINE

## 2019-09-07 PROCEDURE — 99232 SBSQ HOSP IP/OBS MODERATE 35: CPT | Performed by: FAMILY MEDICINE

## 2019-09-07 RX ORDER — POTASSIUM CHLORIDE 20 MEQ/1
40 TABLET, EXTENDED RELEASE ORAL ONCE
Status: COMPLETED | OUTPATIENT
Start: 2019-09-07 | End: 2019-09-07

## 2019-09-07 RX ORDER — SODIUM CHLORIDE AND POTASSIUM CHLORIDE .9; .15 G/100ML; G/100ML
100 SOLUTION INTRAVENOUS CONTINUOUS
Status: DISCONTINUED | OUTPATIENT
Start: 2019-09-07 | End: 2019-09-07

## 2019-09-07 RX ORDER — MAGNESIUM SULFATE HEPTAHYDRATE 40 MG/ML
2 INJECTION, SOLUTION INTRAVENOUS ONCE
Status: COMPLETED | OUTPATIENT
Start: 2019-09-07 | End: 2019-09-07

## 2019-09-07 RX ORDER — DEXTROSE MONOHYDRATE 50 MG/ML
75 INJECTION, SOLUTION INTRAVENOUS CONTINUOUS
Status: DISCONTINUED | OUTPATIENT
Start: 2019-09-07 | End: 2019-09-09

## 2019-09-07 RX ADMIN — HYDROMORPHONE HYDROCHLORIDE 1 MG: 1 INJECTION, SOLUTION INTRAMUSCULAR; INTRAVENOUS; SUBCUTANEOUS at 11:52

## 2019-09-07 RX ADMIN — Medication 2000 MG: at 08:14

## 2019-09-07 RX ADMIN — ATORVASTATIN CALCIUM 40 MG: 40 TABLET, FILM COATED ORAL at 16:13

## 2019-09-07 RX ADMIN — HYDROMORPHONE HYDROCHLORIDE 1 MG: 1 INJECTION, SOLUTION INTRAMUSCULAR; INTRAVENOUS; SUBCUTANEOUS at 20:13

## 2019-09-07 RX ADMIN — MAGNESIUM SULFATE HEPTAHYDRATE 2 G: 40 INJECTION, SOLUTION INTRAVENOUS at 12:00

## 2019-09-07 RX ADMIN — HYDROMORPHONE HYDROCHLORIDE 1 MG: 1 INJECTION, SOLUTION INTRAMUSCULAR; INTRAVENOUS; SUBCUTANEOUS at 16:13

## 2019-09-07 RX ADMIN — MORPHINE SULFATE 2 MG: 2 INJECTION, SOLUTION INTRAMUSCULAR; INTRAVENOUS at 14:08

## 2019-09-07 RX ADMIN — POTASSIUM CHLORIDE 40 MEQ: 1500 TABLET, EXTENDED RELEASE ORAL at 11:55

## 2019-09-07 RX ADMIN — SODIUM CHLORIDE, SODIUM LACTATE, POTASSIUM CHLORIDE, AND CALCIUM CHLORIDE 200 ML/HR: .6; .31; .03; .02 INJECTION, SOLUTION INTRAVENOUS at 05:42

## 2019-09-07 RX ADMIN — SODIUM CHLORIDE, SODIUM LACTATE, POTASSIUM CHLORIDE, AND CALCIUM CHLORIDE 200 ML/HR: .6; .31; .03; .02 INJECTION, SOLUTION INTRAVENOUS at 10:09

## 2019-09-07 RX ADMIN — NICOTINE 1 PATCH: 21 PATCH TRANSDERMAL at 08:14

## 2019-09-07 RX ADMIN — HYDROMORPHONE HYDROCHLORIDE 1 MG: 1 INJECTION, SOLUTION INTRAMUSCULAR; INTRAVENOUS; SUBCUTANEOUS at 03:59

## 2019-09-07 RX ADMIN — DEXTROSE 75 ML/HR: 5 SOLUTION INTRAVENOUS at 11:57

## 2019-09-07 RX ADMIN — HYDROMORPHONE HYDROCHLORIDE 1 MG: 1 INJECTION, SOLUTION INTRAMUSCULAR; INTRAVENOUS; SUBCUTANEOUS at 08:14

## 2019-09-07 RX ADMIN — MORPHINE SULFATE 2 MG: 2 INJECTION, SOLUTION INTRAMUSCULAR; INTRAVENOUS at 22:19

## 2019-09-07 RX ADMIN — FAMOTIDINE 20 MG: 20 TABLET ORAL at 08:14

## 2019-09-07 RX ADMIN — ONDANSETRON 4 MG: 2 INJECTION INTRAMUSCULAR; INTRAVENOUS at 03:59

## 2019-09-07 RX ADMIN — FAMOTIDINE 20 MG: 20 TABLET ORAL at 17:45

## 2019-09-07 RX ADMIN — PANTOPRAZOLE SODIUM 40 MG: 40 TABLET, DELAYED RELEASE ORAL at 06:42

## 2019-09-07 RX ADMIN — PANCRELIPASE 36000 UNITS: 24000; 76000; 120000 CAPSULE, DELAYED RELEASE PELLETS ORAL at 16:12

## 2019-09-07 RX ADMIN — PREGABALIN 100 MG: 100 CAPSULE ORAL at 17:45

## 2019-09-07 RX ADMIN — FENOFIBRATE 145 MG: 145 TABLET, COATED ORAL at 08:14

## 2019-09-07 RX ADMIN — TRAZODONE HYDROCHLORIDE 50 MG: 50 TABLET ORAL at 21:22

## 2019-09-07 RX ADMIN — PREGABALIN 100 MG: 100 CAPSULE ORAL at 08:14

## 2019-09-07 RX ADMIN — Medication 2000 MG: at 17:45

## 2019-09-07 RX ADMIN — HYDROMORPHONE HYDROCHLORIDE 1 MG: 1 INJECTION, SOLUTION INTRAMUSCULAR; INTRAVENOUS; SUBCUTANEOUS at 00:02

## 2019-09-07 RX ADMIN — MORPHINE SULFATE 2 MG: 2 INJECTION, SOLUTION INTRAMUSCULAR; INTRAVENOUS at 17:49

## 2019-09-07 RX ADMIN — MORPHINE SULFATE 2 MG: 2 INJECTION, SOLUTION INTRAMUSCULAR; INTRAVENOUS at 06:42

## 2019-09-07 RX ADMIN — SODIUM CHLORIDE, SODIUM LACTATE, POTASSIUM CHLORIDE, AND CALCIUM CHLORIDE 200 ML/HR: .6; .31; .03; .02 INJECTION, SOLUTION INTRAVENOUS at 00:07

## 2019-09-07 RX ADMIN — PANTOPRAZOLE SODIUM 40 MG: 40 TABLET, DELAYED RELEASE ORAL at 16:13

## 2019-09-07 RX ADMIN — SODIUM CHLORIDE 0.5 UNITS/HR: 9 INJECTION, SOLUTION INTRAVENOUS at 14:24

## 2019-09-07 NOTE — PROGRESS NOTES
Progress Note - Forrestine Decant 1975, 53 y o  male MRN: [id number]    Unit/Bed#: -01 Encounter: [id number]    Primary Care Provider: LUANA Plata   Date and time admitted to hospital: 9/6/2019 12:35 PM        Hypertension  Assessment & Plan  Blood pressure is stable continue current medications    Uncomplicated opioid dependence ([address] )  Assessment & Plan  Prior history of opioid dependence  Currently off opioids as outpatient he gets nerve blocks and follows up closely with pain management  Will give IV narcotics for acute pancreatitis     Esophagitis  Assessment & Plan  Protonix and Zantac    Hypertriglyceridemia  Assessment & Plan  Continue current medications  Reportedly he was told to discontinue niacin due to elevated LFTs  Presented with triglycerides more than 1000  Today, with right level is decreased to 793  Abnormal transaminases  Assessment & Plan  Likely related to acute pancreatitis  Continue to monitor  * Acute on chronic pancreatitis [facility])  Assessment & Plan  CT of the abdomen demonstrated no acute inflammatory process  Lipase level normal   Triglycerides are elevated, but significantly improved now  Patient does report that his pain is like acute pancreatitis that he is familiar with  He recently received splanchnic nerve block 2 weeks ago  Reportedly has not been taking niacin due to worsening LFTs  Patient reports compliance in taking Creon, Lovaza and Crestor    Continue IV fluid hydration  Pain control  Started clear liquid diet    Patient will be treated with insulin drip for elevated triglycerides  Discussed with GI       VTE Pharmacologic Prophylaxis:   Pharmacologic: Pharmacologic VTE Prophylaxis contraindicated due to Thrombocytopenia  Mechanical VTE Prophylaxis in Place: Yes    Patient Centered Rounds: I have performed bedside rounds with nursing staff today      Discussions with Specialists or Other Care Team Provider:  GI    Education and Discussions with Family / Patient:  Patient    Time Spent for Care: 20 minutes  More than 50% of total time spent on counseling and coordination of care as described above  Current Length of Stay: 1 day(s)    Current Patient Status: Inpatient   Certification Statement: The patient will continue to require additional inpatient hospital stay due to Pancreatitis and high triglycerides    Discharge Plan:  24 hrs    Code Status: Level 1 - Full Code      Subjective:   Patient was seen and examined  He states that he is not feeling well today  He feels abdominal pain and reports loss of appetite  Objective:     Vitals:   Temp (24hrs), Av 3 °F (36 8 °C), Min:98 1 °F (36 7 °C), Max:98 8 °F (37 1 °C)    Temp:  [98 1 °F (36 7 °C)-98 8 °F (37 1 °C)] 98 1 °F (36 7 °C)  HR:  [69-89] 72  Resp:  [18-20] 18  BP: (152-164)/() 153/86  SpO2:  [94 %-97 %] 95 %  Body mass index is 30 44 kg/m²  Input and Output Summary (last 24 hours): Intake/Output Summary (Last 24 hours) at 2019 1356  Last data filed at 2019 1008  Gross per 24 hour   Intake 2000 ml   Output 1200 ml   Net 800 ml       Physical Exam:     Physical Exam   Constitutional: He appears well-developed and well-nourished  HENT:   Head: Normocephalic and atraumatic  Cardiovascular: Normal rate, regular rhythm and normal heart sounds  Pulmonary/Chest: Effort normal and breath sounds normal  No respiratory distress  Abdominal: Soft  Bowel sounds are normal  There is tenderness (Epigastric; right upper quadrant and left upper quadrant pain)  Musculoskeletal: He exhibits no edema or deformity  Neurological: He is alert  Skin: Skin is warm  Capillary refill takes less than 2 seconds  No rash noted  No erythema  Psychiatric: He has a normal mood and affect             Additional Data:     Labs:    Results from last 7 days   Lab Units 19  0535 19  1319   WBC Thousand/uL 3 97* 4 55   HEMOGLOBIN g/dL 11 6* 13 1   HEMATOCRIT % 35 2* 39 2 PLATELETS Thousands/uL 81* 106*   NEUTROS PCT %  --  57   LYMPHS PCT %  --  33   MONOS PCT %  --  8   EOS PCT %  --  1     Results from last 7 days   Lab Units 09/07/19  0535   SODIUM mmol/L 137   POTASSIUM mmol/L 3 2*   CHLORIDE mmol/L 101   CO2 mmol/L 28   BUN mg/dL 7   CREATININE mg/dL 0 70   ANION GAP mmol/L 8   CALCIUM mg/dL 8 4   ALBUMIN g/dL 2 8*   TOTAL BILIRUBIN mg/dL 0 80   ALK PHOS U/L 147*   ALT U/L 123*   AST U/L 83*   GLUCOSE RANDOM mg/dL 112                 Results from last 7 days   Lab Units 09/06/19  1319   LACTIC ACID mmol/L 0 8           * I Have Reviewed All Lab Data Listed Above  * Additional Pertinent Lab Tests Reviewed:  All Labs Within Last 24 Hours Reviewed    Imaging:    CT abdomen  Recent Cultures (last 7 days):           Last 24 Hours Medication List:     Current Facility-Administered Medications:  albuterol 2 puff Inhalation Q6H PRN Elba Villalobos MD    atorvastatin 40 mg Oral Daily With Matilde Alvarez MD    dextrose 75 mL/hr Intravenous Continuous Elsi Alvarez MD Last Rate: 75 mL/hr (09/07/19 1157)   famotidine 20 mg Oral BID Elba Villalobos MD    fish oil 2,000 mg Oral BID Elba Villalobos MD    HYDROmorphone 1 mg Intravenous Q4H PRN Elba Villalobos MD    insulin regular (HumuLIN R,NovoLIN R) infusion 0 3-21 Units/hr Intravenous Titrated Elsi Alvarez MD    magnesium sulfate 2 g Intravenous Once Elsi Alvarez MD    morphine injection 2 mg Intravenous Q3H PRN Elba Villalobos MD    nicotine 1 patch Transdermal Daily Elba Villalobos MD    ondansetron 4 mg Intravenous Q6H PRN Elba Villalobos MD    pancrelipase (Lip-Prot-Amyl) 12,000 Units Oral PRN Elba Villalobos MD    pancrelipase (Lip-Prot-Amyl) 36,000 Units Oral TID AC Elba Villalobos MD    pantoprazole 40 mg Oral BID AC Elba Villalobos MD    pregabalin 100 mg Oral BID Elba Villalobos MD    traZODone 50 mg Oral HS Elba Villalobos MD      Facility-Administered Medications Ordered in Other Encounters:  oxyCODONE-acetaminophen 2 tablet Oral Once Liliana Chavez MD        Today, Patient Was Seen By: Arnulfo Mariee MD    ** Please Note: Dictation voice to text software may have been used in the creation of this document   **

## 2019-09-07 NOTE — PLAN OF CARE
Problem: PAIN - ADULT  Goal: Verbalizes/displays adequate comfort level or baseline comfort level  Description  Interventions:  - Encourage patient to monitor pain and request assistance  - Assess pain using appropriate pain scale  - Administer analgesics based on type and severity of pain and evaluate response  - Implement non-pharmacological measures as appropriate and evaluate response  - Consider cultural and social influences on pain and pain management  - Notify physician/advanced practitioner if interventions unsuccessful or patient reports new pain  Outcome: Progressing     Problem: SAFETY ADULT  Goal: Patient will remain free of falls  Description  INTERVENTIONS:  - Assess patient frequently for physical needs  -  Identify cognitive and physical deficits and behaviors that affect risk of falls    -  [place] fall precautions as indicated by assessment   - Educate patient/family on patient safety including physical limitations  - Instruct patient to call for assistance with activity based on assessment  - Modify environment to reduce risk of injury  - Consider OT/PT consult to assist with strengthening/mobility  Outcome: Progressing     Problem: GASTROINTESTINAL - ADULT  Goal: Minimal or absence of nausea and/or vomiting  Description  INTERVENTIONS:  - Administer IV fluids if ordered to ensure adequate hydration  - Maintain NPO status until nausea and vomiting are resolved  - Nasogastric tube if ordered  - Administer ordered antiemetic medications as needed  - Provide nonpharmacologic comfort measures as appropriate  - Advance diet as tolerated, if ordered  - Consider nutrition services referral to assist patient with adequate nutrition and appropriate food choices  Outcome: Progressing  Goal: Maintains adequate nutritional intake  Description  INTERVENTIONS:  - Monitor percentage of each meal consumed  - Identify factors contributing to decreased intake, treat as appropriate  - Assist with meals as needed  - Monitor I&O, weight, and lab values if indicated  - Obtain nutrition services referral as needed  Outcome: Progressing     Problem: METABOLIC, FLUID AND ELECTROLYTES - ADULT  Goal: Electrolytes maintained within normal limits  Description  INTERVENTIONS:  - Monitor labs and assess patient for signs and symptoms of electrolyte imbalances  - Administer electrolyte replacement as ordered  - Monitor response to electrolyte replacements, including repeat lab results as appropriate  - Instruct patient on fluid and nutrition as appropriate  Outcome: Progressing

## 2019-09-07 NOTE — ASSESSMENT & PLAN NOTE
CT of the abdomen demonstrated no acute inflammatory process  Lipase level normal   Triglycerides are elevated, but significantly improved now  Patient does report that his pain is like acute pancreatitis that he is familiar with  He recently received splanchnic nerve block 2 weeks ago  Reportedly has not been taking niacin due to worsening LFTs  Patient reports compliance in taking Creon, Lovaza and Crestor    Continue IV fluid hydration  Pain control  Started clear liquid diet    Patient will be treated with insulin drip for elevated triglycerides    Discussed with GI

## 2019-09-07 NOTE — ASSESSMENT & PLAN NOTE
Prior history of opioid dependence    Currently off opioids as outpatient he gets nerve blocks and follows up closely with pain management  Will give IV narcotics for acute pancreatitis

## 2019-09-07 NOTE — CONSULTS
Consultation - [facility]) [facility]  Delma Pandey 53 y o  male MRN: [id number]  Unit/Bed#: -01 Encounter: [id number]      Inpatient consult to gastroenterology  Consult performed by: Kell Garcia MD  Consult ordered by: Smith Miller MD           Reason for Consult / Principal Problem:  Acute on chronic pancreatitis    HPI: Delma Pandey is a 53y o  year old male who presents with epigastric and right upper quadrant abdominal pain  The patient reports that is consistent with prior episodes of pancreatitis  His lipase is normal and his CT does not show peripancreatic inflammation  The patient has known chronic pancreatitis secondary to recurrent episodes of pancreatitis from a combination of alcohol, smoking, and high triglycerides  The patient does not drink any alcohol any more  He still does smoke cigarettes and he reports that he is down to a half pack a day  Recently over the last month his triglycerides have been uncontrolled and yesterday they were checked and they were over 1400  The patient reports that he is compliant with his Crestor, his Lovaza, and his fibrate medication  He denies nausea vomiting  He does have chills  He denies fevers  He has no melena hematochezia no diarrhea no constipation  His liver enzymes are mildly elevated  This happened after his fenofibrate dose was increased and this may be the cause  The patient has had an extensive evaluation for chronic pancreatitis  He has had endoscopic ultrasound in the past   He sees the pain physician and has celiac nerve neurolysis which is really been very helpful for him in managing his chronic pancreatitis pain  It has enabled him to be off of narcotic pain medication  REVIEW OF SYSTEMS:     CONSTITUTIONAL: Denies any fever, chills, or rigors  Good appetite, and no recent weight loss  HEENT: No earache or tinnitus  Denies hearing loss or visual disturbances    CARDIOVASCULAR: No chest pain or palpitations  RESPIRATORY: Denies any cough, hemoptysis, shortness of breath or dyspnea on exertion  GASTROINTESTINAL: As noted in the History of Present Illness  GENITOURINARY: No problems with urination  Denies any hematuria or dysuria  NEUROLOGIC: No dizziness or vertigo, denies headaches  MUSCULOSKELETAL: Denies any muscle or joint pain  SKIN: Denies skin rashes or itching  ENDOCRINE: Denies excessive thirst  Denies intolerance to heat or cold  PSYCHOSOCIAL: Denies depression or anxiety  Denies any recent memory loss  Historical Information   Past Medical History:   Diagnosis Date    Asthma     Chronic pain disorder     GERD (gastroesophageal reflux disease)     Hyperlipidemia     Liver abscess     Meniscus tear     left knee work injury last assessed 08/24/2016    Pancreatitis     Pneumonia      Past Surgical History:   Procedure Laterality Date    CELIAC PLEXUS BLOCK Bilateral 10/29/2018    Procedure: SPLANCHNIC NERVE BLOCK;  Surgeon: Jany Skinner MD;  [facility];  Service: Pain Management     CELIAC PLEXUS BLOCK Bilateral 1/10/2019    Procedure: SPLANCHNIC NERVE BLOCK;  Surgeon: Jany Skinner MD;  [facility];  Service: Pain Management     CHOLECYSTECTOMY      ESOPHAGOGASTRODUODENOSCOPY N/A 11/16/2017    Procedure: ESOPHAGOGASTRODUODENOSCOPY (EGD); Surgeon: Desiree Almeida MD;  [facility]; Service: Gastroenterology    ESOPHAGOGASTRODUODENOSCOPY N/A 4/19/2018    Procedure: ESOPHAGOGASTRODUODENOSCOPY (EGD); Surgeon: Sia Patiño MD;  [facility]; Service: Gastroenterology    ESOPHAGOGASTRODUODENOSCOPY N/A 5/10/2018    Procedure: ESOPHAGOGASTRODUODENOSCOPY (EGD); Surgeon: Loli Nelson MD;  [facility];   Service: Gastroenterology    IR TEMP  CATH  2/28/2019    KNEE ARTHROSCOPY Bilateral     KNEE ARTHROSCOPY Right 2009    cibishino last assessed 08/24/2016    KNEE ARTHROSCOPY Right 07/01/2019    LIVER SURGERY      NERVE BLOCK Bilateral 5/29/2018    Procedure: SPLANCHNIC NERVE BLOCK;  Surgeon: Liliana Chavez MD;  [facility];  Service: Pain Management     PANCREAS SURGERY      stents    PANCREATIC CYST EXCISION      NJ INJECT NERV BLCK,CELIAC PLEXUS Bilateral 5/9/2017    Procedure: CELIAC PLEXUS BLOCK ;  Surgeon: Liliana Chavez MD;  [facility];  Service: Pain Management     NJ INJECT NERV BLCK,CELIAC PLEXUS Bilateral 6/1/2017    Procedure: SPLANCHNIC NERVE BLOCK at T12;  Surgeon: Liliana Chavez MD;  [facility];  Service: Pain Management     NJ INJECT NERV BLCK,CELIAC PLEXUS Bilateral 8/8/2017    Procedure: BILATERAL SPLANCHNIC NERVE BLOCK T12;  Surgeon: Liliana Chavez MD;  [facility];  Service: Pain Management     NJ INJECT NERV BLCK,CELIAC PLEXUS Bilateral 4/18/2019    Procedure: BLOCK / INJECTION CELIAC PLEXUS;  Surgeon: Liliana Chavez MD;  [facility];  Service: Pain Management     NJ INJECT NERV BLCK,CELIAC PLEXUS Bilateral 8/20/2019    Procedure: SPLANCHNIC NERVE BLOCK;  Surgeon: Liliana Chavez MD;  [facility];  Service: Pain Management     NJ INJECT NERV [place] Bilateral 12/28/2017    Procedure: SPLANCHNIC NERVE BLOCK;  Surgeon: Liliana Chavez MD;  [facility];  Service: Pain Management     NJ LAP,CHOLECYSTECTOMY N/A 2/14/2017    Procedure: LAPAROSCOPIC CHOLECYSTECTOMY, IOC, POSSIBLE OPEN ;  Surgeon: Zafar Sotelo MD;  [facility];  Service: General    ROTATOR CUFF REPAIR Right     SHOULDER ARTHROSCOPY      SHOULDER ARTHROSCOPY Right      Social History   Social History     Substance and Sexual Activity   Alcohol Use Yes    Alcohol/week: 1 0 standard drinks    Types: 1 Cans of beer per week    Drinks per session: 3 or 4    Comment: rarely, occasionally, history of no alcohol use per allscripts     Social History     Substance and Sexual Activity   Drug Use No     Social History     Tobacco Use   Smoking Status Current Every Day Smoker    Packs/day: 0 50    Years: 20 00    Pack years: 10 00   Smokeless Tobacco Never Used     Family History   Problem Relation Age of Onset    Cirrhosis Mother     Heart disease Other         cardiac disorder    Cancer Other        Meds/Allergies     Medications Prior to Admission   Medication    albuterol (VENTOLIN HFA) 90 mcg/act inhaler    Choline Fenofibrate (FENOFIBRIC ACID) 135 MG CPDR    EPINEPHrine (EPIPEN 2-ARIEL) 0 3 mg/0 3 mL SOAJ    niacin (NIASPAN) 500 mg CR tablet    nicotine (NICODERM CQ) 21 mg/24 hr TD 24 hr patch    omega-3-acid ethyl esters (LOVAZA) 1 g capsule    ondansetron (ZOFRAN-ODT) 4 mg disintegrating tablet    oxyCODONE (OxyCONTIN) 10 mg 12 hr tablet    oxyCODONE (ROXICODONE) 5 mg immediate release tablet    pancrelipase, Lip-Prot-Amyl, (CREON) 12,000 units capsule    Pancrelipase, Lip-Prot-Amyl, (CREON) 48967 units CPEP    pantoprazole (PROTONIX) 40 mg tablet    pregabalin (LYRICA) 100 mg capsule    ranitidine (ZANTAC) 150 MG capsule    rosuvastatin (CRESTOR) 40 MG tablet    traZODone (DESYREL) 50 mg tablet     Current Facility-Administered Medications   Medication Dose Route Frequency    albuterol (PROVENTIL HFA,VENTOLIN HFA) inhaler 2 puff  2 puff Inhalation Q6H PRN    atorvastatin (LIPITOR) tablet 40 mg  40 mg Oral Daily With Dinner    famotidine (PEPCID) tablet 20 mg  20 mg Oral BID    fenofibrate (TRICOR) tablet 145 mg  145 mg Oral Daily    fish oil capsule 2,000 mg  2,000 mg Oral BID    HYDROmorphone (DILAUDID) injection 1 mg  1 mg Intravenous Q4H PRN    magnesium sulfate 2 g/50 mL IVPB (premix) 2 g  2 g Intravenous Once    morphine injection 2 mg  2 mg Intravenous Q3H PRN    nicotine (NICODERM CQ) 21 mg/24 hr TD 24 hr patch 1 patch  1 patch Transdermal Daily    ondansetron (ZOFRAN) injection 4 mg  4 mg Intravenous Q6H PRN    pancrelipase (Lip-Prot-Amyl) (CREON) delayed release capsule 12,000 Units  12,000 Units Oral PRN    pancrelipase (Lip-Prot-Amyl) (CREON) delayed release capsule 36,000 Units  36,000 Units Oral TID AC    pantoprazole (PROTONIX) EC tablet 40 mg  40 mg Oral BID AC    pregabalin (LYRICA) capsule 100 mg  100 mg Oral BID    sodium chloride 0 9 % with KCl 20 mEq/L infusion (premix)  100 mL/hr Intravenous Continuous    traZODone (DESYREL) tablet 50 mg  50 mg Oral HS     Facility-Administered Medications Ordered in Other Encounters   Medication Dose Route Frequency    oxyCODONE-acetaminophen (PERCOCET) 5-325 mg per tablet 2 tablet  2 tablet Oral Once       Allergies   Allergen Reactions    Bee Venom Swelling       Objective   Blood pressure 153/86, pulse 72, temperature 98 1 °F (36 7 °C), temperature source Oral, resp  rate 18, height 5' 11" (1 803 m), weight 99 kg (218 lb 4 1 oz), SpO2 95 %  Intake/Output Summary (Last 24 hours) at 9/7/2019 1114  Last data filed at 9/7/2019 1008  Gross per 24 hour   Intake 2000 ml   Output 1200 ml   Net 800 ml         PHYSICAL EXAM:      General Appearance:   Alert, cooperative, no distress, appears stated age    HEENT:   Normocephalic, atraumatic, anicteric      Neck:  Supple, symmetrical, trachea midline, no adenopathy;    thyroid: no enlargement/tenderness/nodules; no carotid  bruit or JVD    Lungs:   Clear to auscultation bilaterally; no rales, rhonchi or wheezing; respirations unlabored    Heart[de-identified]   S1 and S2 normal; regular rate and rhythm; no murmur, rub, or gallop     Abdomen:   Soft, tender, non-distended; normal bowel sounds; no masses, no organomegaly    Genitalia:   Deferred    Rectal:   Deferred    Extremities:  No cyanosis, clubbing or edema    Pulses:  2+ and symmetric all extremities    Skin:  Skin color, texture, turgor normal, no rashes or lesions    Lymph nodes:  No palpable cervical, axillary or inguinal lymphadenopathy        Lab Results:   Admission on 09/06/2019   Component Date Value    WBC 09/06/2019 4 55     RBC 09/06/2019 3 81*    Hemoglobin 09/06/2019 13 1     Hematocrit 09/06/2019 39 2     MCV 09/06/2019 103*    MCH 09/06/2019 34 4*    MCHC 09/06/2019 33 4     RDW 09/06/2019 15 7*    MPV 09/06/2019 11 0     Platelets 24/74/2260 106*    nRBC 09/06/2019 0     Neutrophils Relative 09/06/2019 57     Immat GRANS % 09/06/2019 1     Lymphocytes Relative 09/06/2019 33     Monocytes Relative 09/06/2019 8     Eosinophils Relative 09/06/2019 1     Basophils Relative 09/06/2019 0     Neutrophils Absolute 09/06/2019 2 58     Immature Grans Absolute 09/06/2019 0 04     Lymphocytes Absolute 09/06/2019 1 51     Monocytes Absolute 09/06/2019 0 35     Eosinophils Absolute 09/06/2019 0 05     Basophils Absolute 09/06/2019 0 02     Sodium 09/06/2019 139     Potassium 09/06/2019 3 9     Chloride 09/06/2019 102     CO2 09/06/2019 26     ANION GAP 09/06/2019 11     BUN 09/06/2019 10     Creatinine 09/06/2019 0 80     Glucose 09/06/2019 135     Calcium 09/06/2019 8 7     AST 09/06/2019 143*    ALT 09/06/2019 184*    Alkaline Phosphatase 09/06/2019 265*    Total Protein 09/06/2019 6 8     Albumin 09/06/2019 3 2*    Total Bilirubin 09/06/2019 0 80     eGFR 09/06/2019 107     Lipase 09/06/2019 281     LACTIC ACID 09/06/2019 0 8     Color, UA 09/06/2019 Yellow     Clarity, UA 09/06/2019 Clear     Specific Gravity, UA 09/06/2019 1 015     pH, UA 09/06/2019 6 0     Leukocytes, UA 09/06/2019 Negative     Nitrite, UA 09/06/2019 Negative     Protein, UA 09/06/2019 Negative     Glucose, UA 09/06/2019 Negative     Ketones, UA 09/06/2019 Negative     Urobilinogen, UA 09/06/2019 0 2     Bilirubin, UA 09/06/2019 Negative     Blood, UA 09/06/2019 Negative     Triglycerides 09/06/2019 1,436*    Sodium 09/07/2019 137     Potassium 09/07/2019 3 2*    Chloride 09/07/2019 101     CO2 09/07/2019 28     ANION GAP 09/07/2019 8     BUN 09/07/2019 7     Creatinine 09/07/2019 0 70     Glucose 09/07/2019 112     Calcium 09/07/2019 8 4     AST 09/07/2019 83*    ALT 09/07/2019 123*    Alkaline Phosphatase 09/07/2019 147*    Total Protein 09/07/2019 5 8*    Albumin 09/07/2019 2 8*    Total Bilirubin 09/07/2019 0 80     eGFR 09/07/2019 113     Magnesium 09/07/2019 1 2*    WBC 09/07/2019 3 97*    RBC 09/07/2019 3 44*    Hemoglobin 09/07/2019 11 6*    Hematocrit 09/07/2019 35 2*    MCV 09/07/2019 102*    MCH 09/07/2019 33 7     MCHC 09/07/2019 33 0     RDW 09/07/2019 15 4*    Platelets 10/48/9429 81*    MPV 09/07/2019 11 0     Triglycerides 09/07/2019 793*       Imaging Studies: I have personally reviewed pertinent imaging studies  I have reviewed his CT scan of the abdomen pelvis which shows no inflammatory process    ASSESSMENT & PLAN:  Principal Problem:    Acute on chronic pancreatitis (HCC)  Active Problems:    Hypertriglyceridemia    Chronic pain syndrome    Esophagitis    Uncomplicated opioid dependence ([facility] [address] )    Hypertension    He is a 24-year-old male with acute on chronic pancreatitis likely secondary to high uncontrolled triglycerides and smoking  Given his lack of SIRS and a normal lipase and normal CT imaging I do not think he needs plasmapheresis at present  I have spoken to the hospitalist service and we are going to start an insulin drip for control of triglycerides  I have stopped his fenofibrate because I think this is causing his liver enzymes to go up    NPO  Lactated Ringer at 200 mL an hour  Incentive spirometry and DVT prophylaxis  Insulin drip and D10 for treatment of triglyceridemia  I have stopped his fenofibrate  Follow LFTs  Pain control and antiemetics  Serial abdominal exams  If his triglycerides do not go down he may require plasmapheresis  I do think that the patient needs to see a lipid specialist   Our service as an outpatient and his primary care nurse practitioner have been treating his triglycerides for years and used to have episodes of high spiking triglycerides with repeated damage to the pancreas which is obviously alarming and not good  Continue Creon pancreatic enzyme supplementation  Continued follow-up with the pain service for celiac nerve plexus neurolysis    Rakan Yee MD  9/7/2019,11:14 AM

## 2019-09-07 NOTE — UTILIZATION REVIEW
Initial Clinical Review    Admission: Date/Time/Statement: Inpatient Admission Orders (From admission, onward)     Ordered        09/06/19 1610  Inpatient Admission (expected length of stay for this patient Order details is greater than two midnights)  Once                   Orders Placed This Encounter   Procedures    Inpatient Admission (expected length of stay for this patient Order details is greater than two midnights)     Standing Status:   Standing     Number of Occurrences:   1     Order Specific Question:   Admitting Physician     Answer:   Kathy Thomas [id number]     Order Specific Question:   Level of Care     Answer:   Med Surg [16]     Order Specific Question:   Estimated length of stay     Answer:   More than 2 Midnights     Order Specific Question:   Certification     Answer:   I certify that inpatient services are medically necessary for this patient for a duration of greater than two midnights  See H&P and MD Progress Notes for additional information about the patient's course of treatment  ED Arrival Information     Expected Arrival Acuity Means of Arrival Escorted By Service Admission Type    - 9/6/2019 12:16 Emergent Walk-In Self General Medicine Emergency    Arrival Complaint    Abdominal pain        Chief Complaint   Patient presents with    Abdominal Pain     pt with hx of chronic pancreatitis reports increassed pain since beginning of week, instructed by PCP to be evaluated in the ED     Assessment/Plan:   53y Male to ED presents with epigastric and right upper quadrant abdominal pain, similar to prior episodes of pancreatitis  PMH of Chronic pancreatitis from Etoh  He does not drink anymore  Elevated liver enzymes after fenofibrate dose was increased  Recent splanchnic nerve block 2 wks ago  Admit Inpatient level of care for Acute on chronic pancreatitis and Hypertriglyceridemia   Elevated triglycerides, NPO, IV Fluids, Gastroenterology consult, pain control and continue Creon, Lovaza and Crestor  9/7 Gastroenterology cons; NPO, IV Fluids, incentive spirometry, Insulin drip and D10 for treatment of triglyceridemia, d/c fenofibrate, f/i LFTs, pain/ antiemetics control and serial abdominal exams  If triglycerides do not go down he may require plasmapheresis  Continue Creon pancreatic enzyme supplementation  9/7 Progress notes; triglycerides trending down, continue IV Fluids, pain control, start clear liquid diet and continue Insulin drip for elevated triglycerides    ED Triage Vitals [09/06/19 1227]   Temperature Pulse Respirations Blood Pressure SpO2   98 5 °F (36 9 °C) 93 20 (!) 182/105 95 %      Temp Source Heart Rate Source Patient Position - Orthostatic VS BP Location FiO2 (%)   Oral Monitor Sitting Left arm --      Pain Score       6        Wt Readings from Last 1 Encounters:   09/06/19 99 kg (218 lb 4 1 oz)     Additional Vital Signs:   09/07/19 0700  98 1 °F (36 7 °C)  72  18    95 %  None (Room air)    09/06/19 2300  98 1 °F (36 7 °C)  69  20  153/86  95 %  None (Room air)    09/06/19 1759  98 8 °F (37 1 °C)  87  20  164/100  94 %  None (Room air)      Pertinent Labs/Diagnostic Test Results:   9/6 CT Abd/ Pelvis - No acute inflammatory process  Stable mild splenomegaly    Stable hepatomegaly with fatty infiltration     Results from last 7 days   Lab Units 09/07/19  0535 09/06/19  1319   WBC Thousand/uL 3 97* 4 55   HEMOGLOBIN g/dL 11 6* 13 1   HEMATOCRIT % 35 2* 39 2   PLATELETS Thousands/uL 81* 106*   NEUTROS ABS Thousands/µL  --  2 58         Results from last 7 days   Lab Units 09/07/19  0535 09/06/19  1319 09/04/19  1024   SODIUM mmol/L 137 139 138   POTASSIUM mmol/L 3 2* 3 9 3 7   CHLORIDE mmol/L 101 102 105   CO2 mmol/L 28 26 28   ANION GAP mmol/L 8 11  --    BUN mg/dL 7 10 5*   CREATININE mg/dL 0 70 0 80 0 71   EGFR ml/min/1 73sq m 113 107  --    SL AMB CALCIUM mg/dL  --   --  9 0   CALCIUM mg/dL 8 4 8 7  --    MAGNESIUM mg/dL 1 2*  --   --      Results from last 7 days Lab Units 09/07/19  0535 09/06/19  1319 09/04/19  1024   AST U/L 83* 143* 241*   ALT U/L 123* 184* 224*   ALK PHOS U/L 147* 265* 266*   TOTAL PROTEIN g/dL 5 8* 6 8 6 0*   ALBUMIN g/dL 2 8* 3 2* 3 6   TOTAL BILIRUBIN mg/dL 0 80 0 80 1 1         Results from last 7 days   Lab Units 09/07/19  0535 09/06/19  1319 09/04/19  1024   GLUCOSE RANDOM mg/dL 112 135 113*     Results from last 7 days   Lab Units 09/06/19  1319   LACTIC ACID mmol/L 0 8     Results from last 7 days   Lab Units 09/06/19  1319   LIPASE u/L 281     Results from last 7 days   Lab Units 09/06/19  2024   CLARITY UA  Clear   COLOR UA  Yellow   SPEC GRAV UA  1 015   PH UA  6 0   GLUCOSE UA mg/dl Negative   KETONES UA mg/dl Negative   BLOOD UA  Negative   PROTEIN UA mg/dl Negative   NITRITE UA  Negative   BILIRUBIN UA  Negative   UROBILINOGEN UA E U /dl 0 2   LEUKOCYTES UA  Negative     ED Treatment:   Medication Administration from 09/06/2019 1216 to 09/06/2019 1757       Date/Time Order Dose Route Action     09/06/2019 1324 sodium chloride 0 9 % infusion 1,000 mL/hr Intravenous New Bag     09/06/2019 1325 ondansetron (ZOFRAN) injection 4 mg 4 mg Intravenous Given     09/06/2019 1324 HYDROmorphone (DILAUDID) injection 1 mg 1 mg Intravenous Given     09/06/2019 1524 HYDROmorphone (DILAUDID) injection 1 mg 1 mg Intravenous Given     09/06/2019 1550 iohexol (OMNIPAQUE) 350 MG/ML injection (MULTI-DOSE) 100 mL 100 mL Intravenous Given     09/06/2019 1557 HYDROmorphone (DILAUDID) injection 1 mg 1 mg Intravenous Given        Past Medical History:   Diagnosis Date    Asthma     Chronic pain disorder     GERD (gastroesophageal reflux disease)     Hyperlipidemia     Liver abscess     Meniscus tear     left knee work injury last assessed 08/24/2016    Pancreatitis     Pneumonia      Present on Admission:   Chronic pain syndrome   Acute on chronic pancreatitis (HCC)   Esophagitis   Hypertriglyceridemia   Hypertension   Uncomplicated opioid dependence [facility])    Admitting Diagnosis: Acute pancreatitis [K85 90]  Abdominal pain [R10 9]  Hypertriglyceridemia [E78 1]     Age/Sex: 53 y o  male     Admission Orders:  IP CONSULT TO GASTROENTEROLOGY  Clear liquid    Current Facility-Administered Medications:  albuterol 2 puff Inhalation Q6H PRN   atorvastatin 40 mg Oral Daily With Dinner   dextrose 75 mL/hr Intravenous Continuous   famotidine 20 mg Oral BID   fish oil 2,000 mg Oral BID   HYDROmorphone 1 mg Intravenous Q4H PRN  9/6x1, 9/7 x4   insulin regular (HumuLIN R,NovoLIN R) infusion 0 3-21 Units/hr Intravenous Titrated   magnesium sulfate 2 g Intravenous Once   morphine injection 2 mg Intravenous Q3H PRN 9/6 x2, 9/7 x1   nicotine 1 patch Transdermal Daily   ondansetron 4 mg Intravenous Q6H PRN  9/7 x1   pancrelipase (Lip-Prot-Amyl) 12,000 Units Oral PRN   pancrelipase (Lip-Prot-Amyl) 36,000 Units Oral TID AC   pantoprazole 40 mg Oral BID AC   potassium chloride 40 mEq Oral Once   pregabalin 100 mg Oral BID   traZODone 50 mg Oral HS     lactated ringers infusion   Rate: 200 mL/hr Dose: 200 mL/hr  Freq: Continuous Route: IV  Last Dose: Stopped (09/07/19 1154)  Start: 09/06/19 1815 End: 09/07/19 1104    Network Utilization Review Department  Phone: [phone]; Fax [phone]  [email or web address]  ATTENTION: Please call with any questions or concerns to [phone]  and carefully listen to the prompts so that you are directed to the right person  Send all requests for admission clinical reviews, approved or denied determinations and any other requests to fax [phone]   All voicemails are confidential

## 2019-09-07 NOTE — ASSESSMENT & PLAN NOTE
Continue current medications  Reportedly he was told to discontinue niacin due to elevated LFTs  Presented with triglycerides more than 1000  Today, with right level is decreased to 793

## 2019-09-08 LAB
ALBUMIN SERPL BCP-MCNC: 3 G/DL (ref 3.5–5)
ALP SERPL-CCNC: 136 U/L (ref 46–116)
ALT SERPL W P-5'-P-CCNC: 115 U/L (ref 12–78)
AMYLASE SERPL-CCNC: 26 IU/L (ref 25–115)
ANION GAP SERPL CALCULATED.3IONS-SCNC: 9 MMOL/L (ref 4–13)
AST SERPL W P-5'-P-CCNC: 84 U/L (ref 5–45)
BILIRUB SERPL-MCNC: 1.1 MG/DL (ref 0.2–1)
BUN SERPL-MCNC: 7 MG/DL (ref 5–25)
CALCIUM SERPL-MCNC: 8 MG/DL (ref 8.3–10.1)
CHLORIDE SERPL-SCNC: 99 MMOL/L (ref 100–108)
CO2 SERPL-SCNC: 28 MMOL/L (ref 21–32)
CREAT SERPL-MCNC: 0.79 MG/DL (ref 0.6–1.3)
FOLATE SERPL-MCNC: >20 NG/ML (ref 3.1–17.5)
GFR SERPL CREATININE-BSD FRML MDRD: 108 ML/MIN/1.73SQ M
GLUCOSE SERPL-MCNC: 106 MG/DL (ref 65–140)
GLUCOSE SERPL-MCNC: 112 MG/DL (ref 65–140)
GLUCOSE SERPL-MCNC: 119 MG/DL (ref 65–140)
GLUCOSE SERPL-MCNC: 134 MG/DL (ref 65–140)
GLUCOSE SERPL-MCNC: 136 MG/DL (ref 65–140)
GLUCOSE SERPL-MCNC: 138 MG/DL (ref 65–140)
GLUCOSE SERPL-MCNC: 141 MG/DL (ref 65–140)
GLUCOSE SERPL-MCNC: 145 MG/DL (ref 65–140)
GLUCOSE SERPL-MCNC: 148 MG/DL (ref 65–140)
GLUCOSE SERPL-MCNC: 151 MG/DL (ref 65–140)
GLUCOSE SERPL-MCNC: 162 MG/DL (ref 65–140)
GLUCOSE SERPL-MCNC: 171 MG/DL (ref 65–140)
GLUCOSE SERPL-MCNC: 95 MG/DL (ref 65–140)
LIPASE SERPL-CCNC: 62 U/L (ref 73–393)
POTASSIUM SERPL-SCNC: 3.3 MMOL/L (ref 3.5–5.3)
PROT SERPL-MCNC: 6 G/DL (ref 6.4–8.2)
SODIUM SERPL-SCNC: 136 MMOL/L (ref 136–145)
TRIGL SERPL-MCNC: 643 MG/DL
VIT B12 SERPL-MCNC: 770 PG/ML (ref 100–900)

## 2019-09-08 PROCEDURE — 82607 VITAMIN B-12: CPT | Performed by: INTERNAL MEDICINE

## 2019-09-08 PROCEDURE — 80053 COMPREHEN METABOLIC PANEL: CPT | Performed by: INTERNAL MEDICINE

## 2019-09-08 PROCEDURE — 82150 ASSAY OF AMYLASE: CPT | Performed by: INTERNAL MEDICINE

## 2019-09-08 PROCEDURE — 82948 REAGENT STRIP/BLOOD GLUCOSE: CPT

## 2019-09-08 PROCEDURE — 82746 ASSAY OF FOLIC ACID SERUM: CPT | Performed by: INTERNAL MEDICINE

## 2019-09-08 PROCEDURE — 99232 SBSQ HOSP IP/OBS MODERATE 35: CPT | Performed by: FAMILY MEDICINE

## 2019-09-08 PROCEDURE — 99232 SBSQ HOSP IP/OBS MODERATE 35: CPT | Performed by: INTERNAL MEDICINE

## 2019-09-08 PROCEDURE — 84478 ASSAY OF TRIGLYCERIDES: CPT | Performed by: PHYSICIAN ASSISTANT

## 2019-09-08 PROCEDURE — 83690 ASSAY OF LIPASE: CPT | Performed by: INTERNAL MEDICINE

## 2019-09-08 RX ORDER — SODIUM CHLORIDE 9 MG/ML
25 INJECTION, SOLUTION INTRAVENOUS CONTINUOUS
Status: DISCONTINUED | OUTPATIENT
Start: 2019-09-08 | End: 2019-09-09

## 2019-09-08 RX ORDER — POTASSIUM CHLORIDE 20 MEQ/1
40 TABLET, EXTENDED RELEASE ORAL ONCE
Status: COMPLETED | OUTPATIENT
Start: 2019-09-08 | End: 2019-09-08

## 2019-09-08 RX ADMIN — Medication 2000 MG: at 08:14

## 2019-09-08 RX ADMIN — MORPHINE SULFATE 2 MG: 2 INJECTION, SOLUTION INTRAMUSCULAR; INTRAVENOUS at 06:08

## 2019-09-08 RX ADMIN — SODIUM CHLORIDE 25 ML/HR: 0.9 INJECTION, SOLUTION INTRAVENOUS at 00:17

## 2019-09-08 RX ADMIN — ONDANSETRON 4 MG: 2 INJECTION INTRAMUSCULAR; INTRAVENOUS at 06:08

## 2019-09-08 RX ADMIN — POTASSIUM CHLORIDE 40 MEQ: 1500 TABLET, EXTENDED RELEASE ORAL at 04:46

## 2019-09-08 RX ADMIN — HYDROMORPHONE HYDROCHLORIDE 1 MG: 1 INJECTION, SOLUTION INTRAMUSCULAR; INTRAVENOUS; SUBCUTANEOUS at 12:09

## 2019-09-08 RX ADMIN — MORPHINE SULFATE 2 MG: 2 INJECTION, SOLUTION INTRAMUSCULAR; INTRAVENOUS at 22:53

## 2019-09-08 RX ADMIN — ONDANSETRON 4 MG: 2 INJECTION INTRAMUSCULAR; INTRAVENOUS at 17:44

## 2019-09-08 RX ADMIN — PANTOPRAZOLE SODIUM 40 MG: 40 TABLET, DELAYED RELEASE ORAL at 08:14

## 2019-09-08 RX ADMIN — ATORVASTATIN CALCIUM 40 MG: 40 TABLET, FILM COATED ORAL at 18:36

## 2019-09-08 RX ADMIN — TRAZODONE HYDROCHLORIDE 50 MG: 50 TABLET ORAL at 21:07

## 2019-09-08 RX ADMIN — MORPHINE SULFATE 2 MG: 2 INJECTION, SOLUTION INTRAMUSCULAR; INTRAVENOUS at 17:44

## 2019-09-08 RX ADMIN — HYDROMORPHONE HYDROCHLORIDE 1 MG: 1 INJECTION, SOLUTION INTRAMUSCULAR; INTRAVENOUS; SUBCUTANEOUS at 00:20

## 2019-09-08 RX ADMIN — PREGABALIN 100 MG: 100 CAPSULE ORAL at 08:14

## 2019-09-08 RX ADMIN — HYDROMORPHONE HYDROCHLORIDE 1 MG: 1 INJECTION, SOLUTION INTRAMUSCULAR; INTRAVENOUS; SUBCUTANEOUS at 16:26

## 2019-09-08 RX ADMIN — PREGABALIN 100 MG: 100 CAPSULE ORAL at 18:36

## 2019-09-08 RX ADMIN — PANCRELIPASE 36000 UNITS: 24000; 76000; 120000 CAPSULE, DELAYED RELEASE PELLETS ORAL at 08:14

## 2019-09-08 RX ADMIN — HYDROMORPHONE HYDROCHLORIDE 1 MG: 1 INJECTION, SOLUTION INTRAMUSCULAR; INTRAVENOUS; SUBCUTANEOUS at 21:07

## 2019-09-08 RX ADMIN — PANTOPRAZOLE SODIUM 40 MG: 40 TABLET, DELAYED RELEASE ORAL at 18:36

## 2019-09-08 RX ADMIN — DEXTROSE 75 ML/HR: 5 SOLUTION INTRAVENOUS at 00:19

## 2019-09-08 RX ADMIN — HYDROMORPHONE HYDROCHLORIDE 1 MG: 1 INJECTION, SOLUTION INTRAMUSCULAR; INTRAVENOUS; SUBCUTANEOUS at 04:17

## 2019-09-08 RX ADMIN — FAMOTIDINE 20 MG: 20 TABLET ORAL at 18:36

## 2019-09-08 RX ADMIN — FAMOTIDINE 20 MG: 20 TABLET ORAL at 08:14

## 2019-09-08 RX ADMIN — MORPHINE SULFATE 2 MG: 2 INJECTION, SOLUTION INTRAMUSCULAR; INTRAVENOUS at 10:19

## 2019-09-08 RX ADMIN — NICOTINE 1 PATCH: 21 PATCH TRANSDERMAL at 08:13

## 2019-09-08 RX ADMIN — DEXTROSE 75 ML/HR: 5 SOLUTION INTRAVENOUS at 13:46

## 2019-09-08 RX ADMIN — MORPHINE SULFATE 2 MG: 2 INJECTION, SOLUTION INTRAMUSCULAR; INTRAVENOUS at 14:11

## 2019-09-08 RX ADMIN — PANCRELIPASE 36000 UNITS: 24000; 76000; 120000 CAPSULE, DELAYED RELEASE PELLETS ORAL at 19:56

## 2019-09-08 RX ADMIN — Medication 2000 MG: at 18:36

## 2019-09-08 RX ADMIN — HYDROMORPHONE HYDROCHLORIDE 1 MG: 1 INJECTION, SOLUTION INTRAMUSCULAR; INTRAVENOUS; SUBCUTANEOUS at 08:13

## 2019-09-08 NOTE — PLAN OF CARE
Problem: PAIN - ADULT  Goal: Verbalizes/displays adequate comfort level or baseline comfort level  Description  Interventions:  - Encourage patient to monitor pain and request assistance  - Assess pain using appropriate pain scale  - Administer analgesics based on type and severity of pain and evaluate response  - Implement non-pharmacological measures as appropriate and evaluate response  - Consider cultural and social influences on pain and pain management  - Notify physician/advanced practitioner if interventions unsuccessful or patient reports new pain  Outcome: Progressing     Problem: SAFETY ADULT  Goal: Patient will remain free of falls  Description  INTERVENTIONS:  - Assess patient frequently for physical needs  -  Identify cognitive and physical deficits and behaviors that affect risk of falls    -  [place] fall precautions as indicated by assessment   - Educate patient/family on patient safety including physical limitations  - Instruct patient to call for assistance with activity based on assessment  - Modify environment to reduce risk of injury  - Consider OT/PT consult to assist with strengthening/mobility  Outcome: Progressing     Problem: GASTROINTESTINAL - ADULT  Goal: Minimal or absence of nausea and/or vomiting  Description  INTERVENTIONS:  - Administer IV fluids if ordered to ensure adequate hydration  - Maintain NPO status until nausea and vomiting are resolved  - Nasogastric tube if ordered  - Administer ordered antiemetic medications as needed  - Provide nonpharmacologic comfort measures as appropriate  - Advance diet as tolerated, if ordered  - Consider nutrition services referral to assist patient with adequate nutrition and appropriate food choices  Outcome: Progressing  Goal: Maintains adequate nutritional intake  Description  INTERVENTIONS:  - Monitor percentage of each meal consumed  - Identify factors contributing to decreased intake, treat as appropriate  - Assist with meals as needed  - Monitor I&O, weight, and lab values if indicated  - Obtain nutrition services referral as needed  Outcome: Progressing     Problem: METABOLIC, FLUID AND ELECTROLYTES - ADULT  Goal: Electrolytes maintained within normal limits  Description  INTERVENTIONS:  - Monitor labs and assess patient for signs and symptoms of electrolyte imbalances  - Administer electrolyte replacement as ordered  - Monitor response to electrolyte replacements, including repeat lab results as appropriate  - Instruct patient on fluid and nutrition as appropriate  Outcome: Progressing  Goal: Fluid balance maintained  Description  INTERVENTIONS:  - Monitor labs   - Monitor I/O and WT  - Instruct patient on fluid and nutrition as appropriate  - Assess for signs & symptoms of volume excess or deficit  Outcome: Progressing  Goal: Glucose maintained within target range  Description  INTERVENTIONS:  - Monitor Blood Glucose as ordered  - Assess for signs and symptoms of hyperglycemia and hypoglycemia  - Administer ordered medications to maintain glucose within target range  - Assess nutritional intake and initiate nutrition service referral as needed  Outcome: Progressing     Problem: Potential for Falls  Goal: Patient will remain free of falls  Description  INTERVENTIONS:  - Assess patient frequently for physical needs  -  Identify cognitive and physical deficits and behaviors that affect risk of falls    -  [place] fall precautions as indicated by assessment   - Educate patient/family on patient safety including physical limitations  - Instruct patient to call for assistance with activity based on assessment  - Modify environment to reduce risk of injury  - Consider OT/PT consult to assist with strengthening/mobility  Outcome: Progressing

## 2019-09-08 NOTE — ASSESSMENT & PLAN NOTE
CT of the abdomen demonstrated no acute inflammatory process  Lipase level normal   Triglycerides are elevated, but significantly improved now  Patient does report that his pain is like acute pancreatitis that he is familiar with  He recently received splanchnic nerve block 2 weeks ago  Reportedly has not been taking niacin due to worsening LFTs  Patient reports compliance in taking Creon, Lovaza and Crestor    Continue IV fluid hydration  Pain control  Started clear liquid diet    Patient will be treated with insulin drip for elevated triglycerides  Triglyceride levels have improved today  Discussed with GI and patient will continue insulin drip through 1800 today

## 2019-09-08 NOTE — ASSESSMENT & PLAN NOTE
Continue current medications  Presented with triglycerides more than 1000  Today, after insulin drip, triglycerides are improved at around 600

## 2019-09-08 NOTE — PROGRESS NOTES
Progress Note - Anne Washington 1975, 53 y o  male MRN: [id number]    Unit/Bed#: -01 Encounter: [id number]    Primary Care Provider: LUANA Streeter   Date and time admitted to hospital: 9/6/2019 12:35 PM        Esophagitis  Assessment & Plan  Protonix and Zantac    Hypertriglyceridemia  Assessment & Plan  Continue current medications  Presented with triglycerides more than 1000  Today, after insulin drip, triglycerides are improved at around 600  Abnormal transaminases  Assessment & Plan  Likely related to acute pancreatitis  Continue to monitor  * Acute on chronic pancreatitis [facility])  Assessment & Plan  CT of the abdomen demonstrated no acute inflammatory process  Lipase level normal   Triglycerides are elevated, but significantly improved now  Patient does report that his pain is like acute pancreatitis that he is familiar with  He recently received splanchnic nerve block 2 weeks ago  Reportedly has not been taking niacin due to worsening LFTs  Patient reports compliance in taking Creon, Lovaza and Crestor    Continue IV fluid hydration  Pain control  Started clear liquid diet    Patient will be treated with insulin drip for elevated triglycerides  Triglyceride levels have improved today  Discussed with GI and patient will continue insulin drip through 1800 today  VTE Pharmacologic Prophylaxis:   Pharmacologic: Pharmacologic VTE Prophylaxis contraindicated due to Thrombocytopenia  Mechanical VTE Prophylaxis in Place: Yes    Patient Centered Rounds: I have performed bedside rounds with nursing staff today  Discussions with Specialists or Other Care Team Provider:  GI    Education and Discussions with Family / Patient:  Patient    Time Spent for Care: 20 minutes  More than 50% of total time spent on counseling and coordination of care as described above      Current Length of Stay: 2 day(s)    Current Patient Status: Inpatient   Certification Statement: The patient will continue to require additional inpatient hospital stay due to Pancreatitis    Discharge Plan:  Tomorrow    Code Status: Level 1 - Full Code      Subjective:   Patient was seen and examined today  He continues to experience pain in the epigastric region  Objective:     Vitals:   Temp (24hrs), Av 9 °F (36 6 °C), Min:97 5 °F (36 4 °C), Max:98 3 °F (36 8 °C)    Temp:  [97 5 °F (36 4 °C)-98 3 °F (36 8 °C)] 98 3 °F (36 8 °C)  HR:  [71-82] 71  Resp:  [18-20] 20  BP: (133-152)/(80-89) 152/80  SpO2:  [94 %-98 %] 96 %  Body mass index is 30 44 kg/m²  Input and Output Summary (last 24 hours): Intake/Output Summary (Last 24 hours) at 2019 1224  Last data filed at 2019 0900  Gross per 24 hour   Intake 1890 ml   Output 1600 ml   Net 290 ml       Physical Exam:     Physical Exam   Constitutional: He appears well-developed and well-nourished  HENT:   Head: Normocephalic and atraumatic  Cardiovascular: Normal rate, regular rhythm and normal heart sounds  Pulmonary/Chest: Effort normal and breath sounds normal  No respiratory distress  Abdominal: Soft  Bowel sounds are normal  There is tenderness (Epigastric area)  Musculoskeletal: He exhibits no edema or deformity  Neurological: He is alert  Skin: Skin is warm  Capillary refill takes less than 2 seconds  No rash noted  No erythema  Psychiatric: He has a normal mood and affect             Additional Data:     Labs:    Results from last 7 days   Lab Units 19  0535 19  1319   WBC Thousand/uL 3 97* 4 55   HEMOGLOBIN g/dL 11 6* 13 1   HEMATOCRIT % 35 2* 39 2   PLATELETS Thousands/uL 81* 106*   NEUTROS PCT %  --  57   LYMPHS PCT %  --  33   MONOS PCT %  --  8   EOS PCT %  --  1     Results from last 7 days   Lab Units 19  0007   SODIUM mmol/L 136   POTASSIUM mmol/L 3 3*   CHLORIDE mmol/L 99*   CO2 mmol/L 28   BUN mg/dL 7   CREATININE mg/dL 0 79   ANION GAP mmol/L 9   CALCIUM mg/dL 8 0*   ALBUMIN g/dL 3 0*   TOTAL BILIRUBIN mg/dL 1 10*   ALK PHOS U/L 136*   ALT U/L 115*   AST U/L 84*   GLUCOSE RANDOM mg/dL 148*         Results from last 7 days   Lab Units 09/08/19  1141 09/08/19  1008 09/08/19  0806 09/08/19  0601 09/08/19  0405 09/08/19  0200 09/08/19  0008 09/07/19  2218 09/07/19  2001 09/07/19  1809 09/07/19  1604 09/07/19  1426   POC GLUCOSE mg/dl 106 95 162* 134 119 151* 141* 126 100 157* 209* 106         Results from last 7 days   Lab Units 09/06/19  1319   LACTIC ACID mmol/L 0 8           * I Have Reviewed All Lab Data Listed Above  * Additional Pertinent Lab Tests Reviewed:  All Labs Within Last 24 Hours Reviewed    Imaging:    CT abdomen    Recent Cultures (last 7 days):           Last 24 Hours Medication List:     Current Facility-Administered Medications:  albuterol 2 puff Inhalation Q6H PRN Timothy Green MD    atorvastatin 40 mg Oral Daily With Magdalena Hayes MD    dextrose 75 mL/hr Intravenous Continuous Joselito Reeves MD Last Rate: 75 mL/hr (09/08/19 0019)   famotidine 20 mg Oral BID Timothy Green MD    fish oil 2,000 mg Oral BID Timothy Green MD    HYDROmorphone 1 mg Intravenous Q4H PRN Timothy Green MD    insulin regular (HumuLIN R,NovoLIN R) infusion 0 3-21 Units/hr Intravenous Titrated Joselito Reeves MD Last Rate: Stopped (09/08/19 1016)   morphine injection 2 mg Intravenous Q3H PRN Timothy Green MD    nicotine 1 patch Transdermal Daily Timothy Green MD    ondansetron 4 mg Intravenous Q6H PRN Timothy Green MD    pancrelipase (Lip-Prot-Amyl) 12,000 Units Oral PRN Timothy Green MD    pancrelipase (Lip-Prot-Amyl) 36,000 Units Oral TID AC Timothy Green MD    pantoprazole 40 mg Oral BID AC Timothy Green MD    pregabalin 100 mg Oral BID Timothy Green MD    sodium chloride 25 mL/hr Intravenous Continuous Heather Savage PA-C Last Rate: 25 mL/hr (09/08/19 0017)   traZODone 50 mg Oral HS Timothy Green MD      Facility-Administered Medications Ordered in Other Encounters:  oxyCODONE-acetaminophen 2 tablet Oral Once Deirdre Tong MD        Today, Patient Was Seen By: Scooby Shi MD    ** Please Note: Dictation voice to text software may have been used in the creation of this document   **

## 2019-09-08 NOTE — PROGRESS NOTES
Progress Note  Huy Ricks 53 y o  male MRN: [id number]  Unit/Bed#: -01 Encounter: [id number]    Subjective:  He feels better  He is tolerating a liquid diet and wants to advance his diet  He has less abdominal pain  He has no fevers chills nausea vomiting melena hematochezia  He did well overnight with the insulin drip  His triglycerides have come down  Objective:     Vitals:   Vitals:    09/08/19 1454   BP: 145/77   Pulse: 75   Resp: 18   Temp: 98 °F (36 7 °C)   SpO2: 98%   ,Body mass index is 30 44 kg/m²        Intake/Output Summary (Last 24 hours) at 9/8/2019 1543  Last data filed at 9/8/2019 1344  Gross per 24 hour   Intake 3430 ml   Output 1600 ml   Net 1830 ml       Physical Exam:     General Appearance: Alert, appears stated age and cooperative  Lungs: Clear to auscultation bilaterally, no rales or rhonchi, no labored breathing/accessory muscle use  Heart: Regular rate and rhythm, S1, S2 normal, no murmur, click, rub or gallop  Abdomen: Soft, non-tender, non-distended; bowel sounds normal; no masses or no organomegaly  Extremities: No cyanosis, edema    Invasive Devices     Peripheral Intravenous Line            Peripheral IV 09/06/19 Left Forearm 1 day    Peripheral IV 09/07/19 Right Hand less than 1 day                Lab Results:  Admission on 09/06/2019   Component Date Value    WBC 09/06/2019 4 55     RBC 09/06/2019 3 81*    Hemoglobin 09/06/2019 13 1     Hematocrit 09/06/2019 39 2     MCV 09/06/2019 103*    MCH 09/06/2019 34 4*    MCHC 09/06/2019 33 4     RDW 09/06/2019 15 7*    MPV 09/06/2019 11 0     Platelets 69/37/4013 106*    nRBC 09/06/2019 0     Neutrophils Relative 09/06/2019 57     Immat GRANS % 09/06/2019 1     Lymphocytes Relative 09/06/2019 33     Monocytes Relative 09/06/2019 8     Eosinophils Relative 09/06/2019 1     Basophils Relative 09/06/2019 0     Neutrophils Absolute 09/06/2019 2 58     Immature Grans Absolute 09/06/2019 0 04     Lymphocytes Absolute 09/06/2019 1 51     Monocytes Absolute 09/06/2019 0 35     Eosinophils Absolute 09/06/2019 0 05     Basophils Absolute 09/06/2019 0 02     Sodium 09/06/2019 139     Potassium 09/06/2019 3 9     Chloride 09/06/2019 102     CO2 09/06/2019 26     ANION GAP 09/06/2019 11     BUN 09/06/2019 10     Creatinine 09/06/2019 0 80     Glucose 09/06/2019 135     Calcium 09/06/2019 8 7     AST 09/06/2019 143*    ALT 09/06/2019 184*    Alkaline Phosphatase 09/06/2019 265*    Total Protein 09/06/2019 6 8     Albumin 09/06/2019 3 2*    Total Bilirubin 09/06/2019 0 80     eGFR 09/06/2019 107     Lipase 09/06/2019 281     LACTIC ACID 09/06/2019 0 8     Color, UA 09/06/2019 Yellow     Clarity, UA 09/06/2019 Clear     Specific Gravity, UA 09/06/2019 1 015     pH, UA 09/06/2019 6 0     Leukocytes, UA 09/06/2019 Negative     Nitrite, UA 09/06/2019 Negative     Protein, UA 09/06/2019 Negative     Glucose, UA 09/06/2019 Negative     Ketones, UA 09/06/2019 Negative     Urobilinogen, UA 09/06/2019 0 2     Bilirubin, UA 09/06/2019 Negative     Blood, UA 09/06/2019 Negative     Triglycerides 09/06/2019 1,436*    Sodium 09/07/2019 137     Potassium 09/07/2019 3 2*    Chloride 09/07/2019 101     CO2 09/07/2019 28     ANION GAP 09/07/2019 8     BUN 09/07/2019 7     Creatinine 09/07/2019 0 70     Glucose 09/07/2019 112     Calcium 09/07/2019 8 4     AST 09/07/2019 83*    ALT 09/07/2019 123*    Alkaline Phosphatase 09/07/2019 147*    Total Protein 09/07/2019 5 8*    Albumin 09/07/2019 2 8*    Total Bilirubin 09/07/2019 0 80     eGFR 09/07/2019 113     Magnesium 09/07/2019 1 2*    WBC 09/07/2019 3 97*    RBC 09/07/2019 3 44*    Hemoglobin 09/07/2019 11 6*    Hematocrit 09/07/2019 35 2*    MCV 09/07/2019 102*    MCH 09/07/2019 33 7     MCHC 09/07/2019 33 0     RDW 09/07/2019 15 4*    Platelets 29/42/0323 81*    MPV 09/07/2019 11 0     Triglycerides 09/07/2019 793*    POC Glucose 09/07/2019 106     POC Glucose 09/07/2019 209*    POC Glucose 09/07/2019 157*    POC Glucose 09/07/2019 100     Sodium 09/08/2019 136     Potassium 09/08/2019 3 3*    Chloride 09/08/2019 99*    CO2 09/08/2019 28     ANION GAP 09/08/2019 9     BUN 09/08/2019 7     Creatinine 09/08/2019 0 79     Glucose 09/08/2019 148*    Calcium 09/08/2019 8 0*    AST 09/08/2019 84*    ALT 09/08/2019 115*    Alkaline Phosphatase 09/08/2019 136*    Total Protein 09/08/2019 6 0*    Albumin 09/08/2019 3 0*    Total Bilirubin 09/08/2019 1 10*    eGFR 09/08/2019 108     POC Glucose 09/07/2019 126     POC Glucose 09/08/2019 141*    POC Glucose 09/08/2019 151*    Vitamin B-12 09/08/2019 770     Folate 09/08/2019 >20 0*    Lipase 09/08/2019 62*    Amylase 09/08/2019 26     POC Glucose 09/08/2019 119     POC Glucose 09/08/2019 134     Triglycerides 09/08/2019 643*    POC Glucose 09/08/2019 162*    POC Glucose 09/08/2019 95     POC Glucose 09/08/2019 106     POC Glucose 09/08/2019 171*       Imaging Studies:   I have personally reviewed pertinent imaging studies  Ct Chest Abdomen Pelvis Wo Contrast    Result Date: 8/20/2019  Narrative: CT CHEST, ABDOMEN AND PELVIS WITHOUT IV CONTRAST INDICATION:   Hypotension and weakness  COMPARISON:  4/14/2019 and 4/18/2019 TECHNIQUE: CT examination of the chest, abdomen and pelvis was performed without intravenous contrast   Axial, sagittal, and coronal 2D reformatted images were created from the source data and submitted for interpretation  Radiation dose length product (DLP) for this visit:  23-14-20-09 mGy-cm   This examination, like all CT scans performed in the [facility], was performed utilizing techniques to minimize radiation dose exposure, including the use of iterative reconstruction and automated exposure control  Enteric contrast was administered  FINDINGS: CHEST LUNGS:  Lungs are clear  There is no tracheal or endobronchial lesion   PLEURA: Unremarkable  HEART/GREAT VESSELS:  Unremarkable for patient's age  MEDIASTINUM AND FER:  Unremarkable  CHEST WALL AND LOWER NECK:   Unremarkable  ABDOMEN LIVER/BILIARY TREE:  Liver is enlarged and diffusely decreased in density consistent with fatty change  No CT evidence of suspicious hepatic mass  Normal hepatic contours  No biliary dilatation  GALLBLADDER:  Gallbladder is surgically absent  SPLEEN:  Unremarkable  PANCREAS:  Unremarkable  ADRENAL GLANDS:  Unremarkable  KIDNEYS/URETERS:  Unremarkable  No hydronephrosis  STOMACH AND BOWEL:  There is colonic diverticulosis without evidence of acute diverticulitis  APPENDIX:  No findings to suggest appendicitis  ABDOMINOPELVIC CAVITY: There is mild stranding around the celiac axis  There is retrocrural gas at the T12 level  There is contrast in the right retroperitoneum overlying the proximal psoas/diaphragmatic leodan, also at the T12 level  No ascites or free  intraperitoneal air  No lymphadenopathy  VESSELS:  Unremarkable for patient's age  PELVIS REPRODUCTIVE ORGANS:  Unremarkable for patient's age  URINARY BLADDER:  Unremarkable  ABDOMINAL WALL/INGUINAL REGIONS:  Unremarkable  OSSEOUS STRUCTURES:  No acute fracture or destructive osseous lesion  There is bilateral hip AVN  Impression: 1  Retroperitoneal contrast along the right proximal psoas/diaphragmatic leodan with retrocrural gas and stranding around the celiac axis  These changes are likely related to recent bilateral splanchnic nerve block though correlation with procedural technique recommended  2   Hepatomegaly with diffuse hepatic steatosis  3   Bilateral hip AVN  Workstation performed: [id number]     Fl < 1 Hour    Result Date: 8/29/2019  Narrative: FLUOROSCOPY LESS THAN ONE HOUR INDICATION:  Pancreatitis  COMPARISON:  NA  IMAGES:  70 FINDINGS: Imaging with guidance was provided to Dr Eleonora Goins during described bilateral splanchnic nerve block   1 minute 25 seconds of fluoroscopic time was utilized 6 mL of Omnipaque 240 was utilized  Impression: Imaging guidance provided during pain management procedure  Please see separate procedure notes for additional detail   Workstation performed: [id number]     Ct Abdomen Pelvis With Contrast    Result Date: 9/6/2019  Narrative: CT ABDOMEN AND PELVIS WITH IV CONTRAST INDICATION:   abdominal pain, h/o pancreatitis  COMPARISON:  CT scan 8/20/2019  TECHNIQUE:  CT examination of the abdomen and pelvis was performed  Axial, sagittal, and coronal 2D reformatted images were created from the source data and submitted for interpretation  Radiation dose length product (DLP) for this visit:  613 mGy-cm   This examination, like all CT scans performed in the [facility], was performed utilizing techniques to minimize radiation dose exposure, including the use of iterative reconstruction and automated exposure control  IV Contrast:  100 mL of iohexol (OMNIPAQUE) Enteric Contrast:  Enteric contrast was not administered  FINDINGS: ABDOMEN LOWER CHEST:  No clinically significant abnormality identified in the visualized lower chest  LIVER/BILIARY TREE:  Stable hepatomegaly with steatosis  GALLBLADDER:  Removed  SPLEEN:  The spleen is enlarged, measuring  14 1 cm  PANCREAS:  Unremarkable  ADRENAL GLANDS:  Unremarkable  KIDNEYS/URETERS:  Unremarkable  No hydronephrosis  STOMACH AND BOWEL:  Unremarkable  APPENDIX:  A normal appendix was visualized  ABDOMINOPELVIC CAVITY:  No ascites or free intraperitoneal air  No lymphadenopathy  VESSELS:  Unremarkable for patient's age  PELVIS REPRODUCTIVE ORGANS:  Unremarkable for patient's age  URINARY BLADDER:  Unremarkable  ABDOMINAL WALL/INGUINAL REGIONS:  Unremarkable  OSSEOUS STRUCTURES:  Bilateral hip AVN  No acute fracture or destructive osseous lesion  Impression: No acute inflammatory process  Stable mild splenomegaly  Stable hepatomegaly with fatty infiltration   Workstation performed: [id number] Assessment & Plan  Principal Problem:    Acute on chronic pancreatitis (HCC)  Active Problems:    Abnormal transaminases    Hypertriglyceridemia    Chronic pain syndrome    Esophagitis    Uncomplicated opioid dependence ([facility] [address] )    Hypertension    He is a 57-year-old male with acute on chronic pancreatitis secondary to high triglycerides and continued smoking  He has been on insulin drip for 24 hours and his triglycerides have come down very nicely to 640   He reports less abdominal pain with no nausea and vomiting and is tolerating liquid diet  Advance diet as tolerated  I have stopped his fenofibrate due to abnormal liver function tests and I told him to do a 2 week break and then resume it at the lower dose  Continue insulin drip to control triglycerides  He will have follow-up with Dr Joann Morales for control of triglycerides  Smoking cessation  Incentive spirometry and DVT prophylaxis      [address] Emily Guzman MD  0/6/2401,5:72 PM

## 2019-09-09 VITALS
OXYGEN SATURATION: 98 % | WEIGHT: 218.26 LBS | SYSTOLIC BLOOD PRESSURE: 142 MMHG | DIASTOLIC BLOOD PRESSURE: 97 MMHG | RESPIRATION RATE: 18 BRPM | TEMPERATURE: 98 F | HEART RATE: 62 BPM | HEIGHT: 71 IN | BODY MASS INDEX: 30.56 KG/M2

## 2019-09-09 DIAGNOSIS — K85.90 ACUTE ON CHRONIC PANCREATITIS (HCC): ICD-10-CM

## 2019-09-09 DIAGNOSIS — K86.1 ACUTE ON CHRONIC PANCREATITIS (HCC): ICD-10-CM

## 2019-09-09 LAB
ALBUMIN SERPL BCP-MCNC: 3.2 G/DL (ref 3.5–5)
ALP SERPL-CCNC: 144 U/L (ref 46–116)
ALT SERPL W P-5'-P-CCNC: 86 U/L (ref 12–78)
ANION GAP SERPL CALCULATED.3IONS-SCNC: 7 MMOL/L (ref 4–13)
AST SERPL W P-5'-P-CCNC: 37 U/L (ref 5–45)
BASOPHILS # BLD AUTO: 0.01 THOUSANDS/ΜL (ref 0–0.1)
BASOPHILS NFR BLD AUTO: 0 % (ref 0–1)
BILIRUB SERPL-MCNC: 0.7 MG/DL (ref 0.2–1)
BUN SERPL-MCNC: 7 MG/DL (ref 5–25)
CALCIUM SERPL-MCNC: 8.8 MG/DL (ref 8.3–10.1)
CHLORIDE SERPL-SCNC: 102 MMOL/L (ref 100–108)
CO2 SERPL-SCNC: 29 MMOL/L (ref 21–32)
CREAT SERPL-MCNC: 0.78 MG/DL (ref 0.6–1.3)
EOSINOPHIL # BLD AUTO: 0.06 THOUSAND/ΜL (ref 0–0.61)
EOSINOPHIL NFR BLD AUTO: 2 % (ref 0–6)
ERYTHROCYTE [DISTWIDTH] IN BLOOD BY AUTOMATED COUNT: 14 % (ref 11.6–15.1)
GFR SERPL CREATININE-BSD FRML MDRD: 108 ML/MIN/1.73SQ M
GLUCOSE SERPL-MCNC: 123 MG/DL (ref 65–140)
GLUCOSE SERPL-MCNC: 124 MG/DL (ref 65–140)
GLUCOSE SERPL-MCNC: 135 MG/DL (ref 65–140)
GLUCOSE SERPL-MCNC: 146 MG/DL (ref 65–140)
GLUCOSE SERPL-MCNC: 152 MG/DL (ref 65–140)
GLUCOSE SERPL-MCNC: 162 MG/DL (ref 65–140)
HCT VFR BLD AUTO: 35.3 % (ref 36.5–49.3)
HGB BLD-MCNC: 11.5 G/DL (ref 12–17)
IMM GRANULOCYTES # BLD AUTO: 0.01 THOUSAND/UL (ref 0–0.2)
IMM GRANULOCYTES NFR BLD AUTO: 0 % (ref 0–2)
LYMPHOCYTES # BLD AUTO: 1.24 THOUSANDS/ΜL (ref 0.6–4.47)
LYMPHOCYTES NFR BLD AUTO: 47 % (ref 14–44)
MCH RBC QN AUTO: 33.6 PG (ref 26.8–34.3)
MCHC RBC AUTO-ENTMCNC: 32.6 G/DL (ref 31.4–37.4)
MCV RBC AUTO: 103 FL (ref 82–98)
MONOCYTES # BLD AUTO: 0.34 THOUSAND/ΜL (ref 0.17–1.22)
MONOCYTES NFR BLD AUTO: 13 % (ref 4–12)
NEUTROPHILS # BLD AUTO: 1 THOUSANDS/ΜL (ref 1.85–7.62)
NEUTS SEG NFR BLD AUTO: 38 % (ref 43–75)
NRBC BLD AUTO-RTO: 0 /100 WBCS
PLATELET # BLD AUTO: 77 THOUSANDS/UL (ref 149–390)
PMV BLD AUTO: 11.7 FL (ref 8.9–12.7)
POTASSIUM SERPL-SCNC: 3.8 MMOL/L (ref 3.5–5.3)
PROT SERPL-MCNC: 6.4 G/DL (ref 6.4–8.2)
RBC # BLD AUTO: 3.42 MILLION/UL (ref 3.88–5.62)
SODIUM SERPL-SCNC: 138 MMOL/L (ref 136–145)
TRIGL SERPL-MCNC: 523 MG/DL
WBC # BLD AUTO: 2.66 THOUSAND/UL (ref 4.31–10.16)

## 2019-09-09 PROCEDURE — 99239 HOSP IP/OBS DSCHRG MGMT >30: CPT | Performed by: FAMILY MEDICINE

## 2019-09-09 PROCEDURE — 82948 REAGENT STRIP/BLOOD GLUCOSE: CPT

## 2019-09-09 PROCEDURE — 80053 COMPREHEN METABOLIC PANEL: CPT | Performed by: FAMILY MEDICINE

## 2019-09-09 PROCEDURE — 84478 ASSAY OF TRIGLYCERIDES: CPT | Performed by: FAMILY MEDICINE

## 2019-09-09 PROCEDURE — 85025 COMPLETE CBC W/AUTO DIFF WBC: CPT | Performed by: FAMILY MEDICINE

## 2019-09-09 RX ORDER — OXYCODONE HYDROCHLORIDE 5 MG/1
5 TABLET ORAL EVERY 4 HOURS PRN
Qty: 20 TABLET | Refills: 0 | Status: SHIPPED | OUTPATIENT
Start: 2019-09-09 | End: 2019-09-09 | Stop reason: SDUPTHER

## 2019-09-09 RX ORDER — OXYCODONE HYDROCHLORIDE 5 MG/1
5 TABLET ORAL EVERY 4 HOURS PRN
Qty: 20 TABLET | Refills: 0 | Status: SHIPPED | OUTPATIENT
Start: 2019-09-09 | End: 2019-09-20

## 2019-09-09 RX ORDER — OXYCODONE HCL 10 MG/1
10 TABLET, FILM COATED, EXTENDED RELEASE ORAL EVERY 12 HOURS SCHEDULED
Qty: 20 TABLET | Refills: 0 | Status: SHIPPED | OUTPATIENT
Start: 2019-09-09 | End: 2019-09-20

## 2019-09-09 RX ADMIN — HYDROMORPHONE HYDROCHLORIDE 1 MG: 1 INJECTION, SOLUTION INTRAMUSCULAR; INTRAVENOUS; SUBCUTANEOUS at 06:15

## 2019-09-09 RX ADMIN — FAMOTIDINE 20 MG: 20 TABLET ORAL at 08:03

## 2019-09-09 RX ADMIN — SODIUM CHLORIDE 25 ML/HR: 0.9 INJECTION, SOLUTION INTRAVENOUS at 00:24

## 2019-09-09 RX ADMIN — NICOTINE 1 PATCH: 21 PATCH TRANSDERMAL at 08:02

## 2019-09-09 RX ADMIN — PREGABALIN 100 MG: 100 CAPSULE ORAL at 08:03

## 2019-09-09 RX ADMIN — DEXTROSE 75 ML/HR: 5 SOLUTION INTRAVENOUS at 02:53

## 2019-09-09 RX ADMIN — MORPHINE SULFATE 2 MG: 2 INJECTION, SOLUTION INTRAMUSCULAR; INTRAVENOUS at 02:56

## 2019-09-09 RX ADMIN — Medication 2000 MG: at 08:03

## 2019-09-09 RX ADMIN — HYDROMORPHONE HYDROCHLORIDE 1 MG: 1 INJECTION, SOLUTION INTRAMUSCULAR; INTRAVENOUS; SUBCUTANEOUS at 01:16

## 2019-09-09 RX ADMIN — PANTOPRAZOLE SODIUM 40 MG: 40 TABLET, DELAYED RELEASE ORAL at 06:20

## 2019-09-09 RX ADMIN — PANCRELIPASE 36000 UNITS: 24000; 76000; 120000 CAPSULE, DELAYED RELEASE PELLETS ORAL at 06:19

## 2019-09-09 RX ADMIN — MORPHINE SULFATE 2 MG: 2 INJECTION, SOLUTION INTRAMUSCULAR; INTRAVENOUS at 09:20

## 2019-09-09 NOTE — ASSESSMENT & PLAN NOTE
Continue current medications  Presented with triglycerides more than 1000    Today, after insulin drip, triglycerides are improved at around 500

## 2019-09-09 NOTE — SOCIAL WORK
CM met with pt at bedside  Pt reports he information remains the same from 4/2019 admission  Pt lives in [place] [facility] [place] wife  Pt lives in 2 story home that has [address] luc[address]  Pt is independent  Pt denies hx of HHC and rehab  Pt uses [facility]  Pt's wife, Blake Tanner is his healthcare rep  Pt denies hx of MH and SA  Pt drives  Pt reports family will transport home  CM reviewed discharge planning process including the following: identifying help at home, patient preference for discharge planning needs, pharmacy preference, and availability of treatment team to discuss questions or concerns patient and/or family may have regarding understanding medications and recognizing signs and symptoms once discharged  CM also encouraged patient to follow up with all recommended appointments after discharge  Patient advised of importance for patient and family to participate in managing patients medical well being  Pt has no needs at this time  CM dept to follow pt through dc

## 2019-09-09 NOTE — ASSESSMENT & PLAN NOTE
Prior history of opioid dependence  Currently off opioids as outpatient he gets nerve blocks and follows up closely with pain management  Will give IV narcotics for acute pancreatitis   Provided with less than a month supply of oxycodone immediate release for pain management of acute flare of pancreatitis  Patient will follow up with his pain management specialist   Reviewed PDMP  His last refill of opioids was on 07/01/2019

## 2019-09-09 NOTE — ASSESSMENT & PLAN NOTE
CT of the abdomen demonstrated no acute inflammatory process  Lipase level normal   Triglycerides are elevated, but significantly improved now  Patient does report that his pain is like acute pancreatitis that he is familiar with  He recently received splanchnic nerve block 2 weeks ago  Reportedly has not been taking niacin due to worsening LFTs  Patient reports compliance in taking Creon, Lovaza and Crestor      Patient tolerated diet  Will require pain medications at home  Triglyceride levels have further improved today  Discussed with GI and patient will continue insulin drip through 1800 today

## 2019-09-09 NOTE — DISCHARGE SUMMARY
Discharge- Lolis Sanchez 1975, 53 y o  male MRN: [id number]    Unit/Bed#: -01 Encounter: [id number]    Primary Care Provider: LUANA Silverio   Date and time admitted to hospital: 9/6/2019 12:35 PM        Uncomplicated opioid dependence [facility])  Assessment & Plan  Prior history of opioid dependence  Currently off opioids as outpatient he gets nerve blocks and follows up closely with pain management  Will give IV narcotics for acute pancreatitis   Provided with less than a month supply of oxycodone immediate release for pain management of acute flare of pancreatitis  Patient will follow up with his pain management specialist   Reviewed PDMP  His last refill of opioids was on 07/01/2019  Hypertriglyceridemia  Assessment & Plan  Continue current medications  Presented with triglycerides more than 1000  Today, after insulin drip, triglycerides are improved at around 500    Abnormal transaminases  Assessment & Plan  Likely related to acute pancreatitis  Continue to monitor  * Acute on chronic pancreatitis [facility])  Assessment & Plan  CT of the abdomen demonstrated no acute inflammatory process  Lipase level normal   Triglycerides are elevated, but significantly improved now  Patient does report that his pain is like acute pancreatitis that he is familiar with  He recently received splanchnic nerve block 2 weeks ago  Reportedly has not been taking niacin due to worsening LFTs  Patient reports compliance in taking Creon, Lovaza and Crestor      Patient tolerated diet  Will require pain medications at home  Triglyceride levels have further improved today  Discussed with GI and patient will continue insulin drip through 1800 today          Discharge Summary - [address] [facility]    Patient Information: Lolis Sanchez 53 y o  male MRN: [id number]  Unit/Bed#: -01 Encounter: [id number]    Discharging Physician / Practitioner: Kris Villa MD  PCP: Miguel Silverio  Admission Date: 9/6/2019  Discharge Date: 09/09/19    Reason for Admission:  Pancreatitis    Discharge Diagnoses:     Principal Problem:    Acute on chronic pancreatitis [facility])  Active Problems:    Abnormal transaminases    Hypertriglyceridemia    Chronic pain syndrome    Esophagitis    Uncomplicated opioid dependence ([address] )    Hypertension  Resolved Problems:    * No resolved hospital problems  *      Consultations During Hospital Stay:  · GI    Procedures Performed:     · None    Significant Findings / Test Results:     · CT abdomen:  Unremarkable pancreas    Incidental Findings:   CT abdomen:Stable mild splenomegaly      Stable hepatomegaly with fatty infiltration    Test Results Pending at Discharge (will require follow up): · None     Outpatient Tests Requested:  · None    Complications:  None    Hospital Course:     Shahana Mccromack is a 53 y o  male patient who originally presented to the hospital on 9/6/2019 due to acute abdominal pain  patient does have  prior past medical history of chronic pancreatitis and multiple hospital admissions for acute pancreatitis believed secondary to hypertriglyceridemia who presents with abdominal pain, nausea ongoing for 3 days  A week ago he received splanchnic no block for his chronic pancreatitis  He states 2 days ago he had triglycerides checked which were markedly elevated, his GI doctor recommended that he go to ED but he did not go until today  His pain was acutely worse who presented to ED for further evaluation  No fevers or chills  No blood in the stool or hematemesis  As part of the hospitalization, patient has had CT scan of the abdomen, it did not reveal any acute inflammatory process around pancreas  He did have elevated triglycerides of 1436  He was treated with insulin drip and triglycerides on his discharge day was 523  Patient has clinically improved  He was able to tolerate diet    I have provided him with a script for oxycodone of immediate release for less than a month supply  He will follow up with his pain specialist upon discharge  Condition at Discharge: stable     Discharge Day Visit / Exam:     Subjective:  Patient was seen and examined today  He still complains of abdominal discomfort  He is, however, able to tolerate diet  Vitals: Blood Pressure: 142/97 (09/09/19 0700)  Pulse: 62 (09/09/19 0700)  Temperature: 98 °F (36 7 °C) (09/09/19 0700)  Temp Source: Oral (09/09/19 0700)  Respirations: 18 (09/09/19 0700)  Height: 5' 11" (180 3 cm) (09/06/19 1759)  Weight - Scale: 99 kg (218 lb 4 1 oz) (09/06/19 1759)  SpO2: 98 % (09/09/19 0700)  Exam:   Physical Exam   Constitutional: He appears well-developed and well-nourished  HENT:   Head: Normocephalic and atraumatic  Neck: Normal range of motion  Cardiovascular: Normal rate, regular rhythm and normal heart sounds  Pulmonary/Chest: Effort normal and breath sounds normal  No respiratory distress  Abdominal: Soft  Bowel sounds are normal  There is tenderness (Epigastric discomfort)  Musculoskeletal: He exhibits no edema or deformity  Neurological: He is alert  Skin: Skin is warm  Capillary refill takes less than 2 seconds  No rash noted  No erythema  Psychiatric: He has a normal mood and affect  Discussion with Family:     Discharge instructions/Information to patient and family:   See after visit summary for information provided to patient and family  Provisions for Follow-Up Care:  See after visit summary for information related to follow-up care and any pertinent home health orders  Disposition:     Home    For Discharges to [facility] SNF:   · Not Applicable to this Patient - Not Applicable to this Patient    Planned Readmission:  No     Discharge Statement:  I spent 45 minutes discharging the patient  This time was spent on the day of discharge  I had direct contact with the patient on the day of discharge   Greater than 50% of the total time was spent examining patient, answering all patient questions, arranging and discussing plan of care with patient as well as directly providing post-discharge instructions  Additional time then spent on discharge activities  Discharge Medications:  See after visit summary for reconciled discharge medications provided to patient and family        ** Please Note: This note has been constructed using a voice recognition system **

## 2019-09-10 DIAGNOSIS — J45.909 UNCOMPLICATED ASTHMA, UNSPECIFIED ASTHMA SEVERITY, UNSPECIFIED WHETHER PERSISTENT: ICD-10-CM

## 2019-09-10 DIAGNOSIS — E78.2 MIXED HYPERLIPIDEMIA: ICD-10-CM

## 2019-09-10 RX ORDER — OMEGA-3-ACID ETHYL ESTERS 1 G/1
2 CAPSULE, LIQUID FILLED ORAL 2 TIMES DAILY
Qty: 360 CAPSULE | Refills: 0 | Status: SHIPPED | OUTPATIENT
Start: 2019-09-10 | End: 2019-12-10 | Stop reason: SDUPTHER

## 2019-09-10 RX ORDER — ALBUTEROL SULFATE 90 UG/1
2 AEROSOL, METERED RESPIRATORY (INHALATION) EVERY 6 HOURS PRN
Qty: 3 INHALER | Refills: 0 | Status: SHIPPED | OUTPATIENT
Start: 2019-09-10 | End: 2019-11-14 | Stop reason: SDUPTHER

## 2019-09-10 RX ORDER — ROSUVASTATIN CALCIUM 40 MG/1
40 TABLET, COATED ORAL DAILY
Qty: 90 TABLET | Refills: 0 | Status: SHIPPED | OUTPATIENT
Start: 2019-09-10 | End: 2019-12-10 | Stop reason: SDUPTHER

## 2019-09-11 NOTE — UTILIZATION REVIEW
Notification of Discharge  This is a Notification of Discharge from our facility [address]  Please be advised that this patient has been discharge from our facility  Below you will find the admission and discharge date and time including the patients disposition  PRESENTATION DATE: 9/6/2019 12:35 PM  OBS ADMISSION DATE:   IP ADMISSION DATE: 9/6/19 1610   DISCHARGE DATE: 9/9/2019 11:35 AM  DISPOSITION: Home/Self Care Home/Self Care   Admission Orders listed below:  Admission Orders (From admission, onward)     Ordered        09/06/19 1610  Inpatient Admission (expected length of stay for this patient Order details is greater than two midnights)  Once                   Please contact the UR Department if additional information is required to close this patient's authorization/case  [address]  Utilization Review Department  Phone: [phone]; Fax [phone]  [email or web address]  ATTENTION: Please call with any questions or concerns to [phone]  and carefully listen to the prompts so that you are directed to the right person  Send all requests for admission clinical reviews, approved or denied determinations and any other requests to fax [phone]   All voicemails are confidential

## 2019-09-13 ENCOUNTER — TELEPHONE (OUTPATIENT)
Dept: PAIN MEDICINE | Facility: CLINIC | Age: 44
End: 2019-09-13

## 2019-09-13 NOTE — TELEPHONE ENCOUNTER
Delivery           Non-Urgent Medical Question  9/11/2019 2:58 PM Reply    RE: FW: Non-Urgent Medical Lorri Soulier 9/12/2019 12:19 PM     RE: FW: Non-Urgent Medical Question  9/12/2019 4:16 PM Reply    To: [address] [place] [address]      From: Sunitha Montalvo      Created: 9/12/2019 4:16 PM        *-*-*This message has not been handled  *-*-*    I am still on the  lyrica  I just dont understand it  Usually the pain goes away slowly, but this time it hasnt  I do not know if it was my triglycerides spiking, or what  I do still have the pain, even though they prescribed the oxycodone 5 mg from the hospital    ----- Message -----  From: Lj Blanton MD  Sent: 9/12/2019 12:19 PM EDT  To: Oral Jackson  Subject: RE: FW: Non-Urgent Medical Question  Mario Husbands,    Are you still on the Lyrica?    ----- Message -----  From: Oral Jackson  Sent: 9/11/2019   2:58 PM EDT  To: Violet Toscano  Subject: Non-Urgent Medical Steffanie June    I was recently in the hospital for abdominal pain and of course my triglycerides   Since I have still been having abdominal pain on both upper left and right areas,  they had given me oxycodone 5mg but it hasn't seemed to be helping much as I am still having pain   I do not know what to do, as I do not think the injection is helping much either

## 2019-09-20 ENCOUNTER — OFFICE VISIT (OUTPATIENT)
Dept: PAIN MEDICINE | Facility: CLINIC | Age: 44
End: 2019-09-20
Payer: COMMERCIAL

## 2019-09-20 VITALS
SYSTOLIC BLOOD PRESSURE: 136 MMHG | HEIGHT: 71 IN | WEIGHT: 214 LBS | HEART RATE: 80 BPM | BODY MASS INDEX: 29.96 KG/M2 | DIASTOLIC BLOOD PRESSURE: 82 MMHG

## 2019-09-20 DIAGNOSIS — K85.90 PANCREATITIS, UNSPECIFIED PANCREATITIS TYPE: Primary | ICD-10-CM

## 2019-09-20 DIAGNOSIS — R10.13 EPIGASTRIC PAIN: ICD-10-CM

## 2019-09-20 DIAGNOSIS — G89.4 CHRONIC PAIN SYNDROME: ICD-10-CM

## 2019-09-20 PROCEDURE — 99214 OFFICE O/P EST MOD 30 MIN: CPT | Performed by: PHYSICIAN ASSISTANT

## 2019-09-20 RX ORDER — PREGABALIN 100 MG/1
100 CAPSULE ORAL 3 TIMES DAILY
Qty: 90 CAPSULE | Refills: 1 | Status: SHIPPED | OUTPATIENT
Start: 2019-09-20 | End: 2019-11-06 | Stop reason: SDUPTHER

## 2019-09-20 NOTE — H&P (VIEW-ONLY)
Assessment:  1  Pancreatitis, unspecified pancreatitis type    2  Epigastric pain    3  Chronic pain syndrome        Plan:  Patient is a 61-year-old male with chronic pancreatitis status post bilateral splanchnic nerve block  He is stable on exam today however feels last injection was not as effective as previous injection in May of 2019  He would like to consider repeat nerve block in 8 weeks but would like to discuss with his wife 1st as he was concerned regarding recent emergency visit for hypertension  I reviewed with patient that hypotension is a possible side effect after procedure however patient feels this was unlike previous bouts of hypotension as he was difficult to arouse and describes near syncopal episode  In regards to increasing pain he was encouraged to start Lyrica 100 mg 3 times daily  If he does know any side effects such as lower extremity swelling he was advised to cut back medication to twice daily  He verbalizes understanding  [place] Prescription Drug Monitoring Program report was reviewed and was appropriate     Complete risks and benefits including bleeding, infection, tissue reaction, nerve injury and allergic reaction were discussed  The approach was demonstrated using models and literature was provided  Verbal and written consent was obtained  The patient will follow-up in 8 weeks for medication prescription refill and reevaluation  The patient was advised to contact the office should their symptoms worsen in the interim  The patient was agreeable and verbalized an understanding  History of Present Illness: The patient is a 53 y o  male last seen on 8/20/19 who presents for a follow up office visit in regards to chronic pain syndrome secondary to pancreatitis  The patient currently reports worsening symptoms since last visit  On 08/20/2019 he completed bilateral splenic nerve block and notes less pain relief than previous injections    He typically finds pain attacks are less intense when present after injections however find epigastric pain continues to be sharp, burning, and cramping  He rates pain as a 7/10 on pain scale today  He additionally notes extreme hypotension which led patient to ED and hospital admission after procedure  He is stable on exam today  He did not increase his Lyrica to 3 times daily as previously discussed and office as he was concerned for lower extremity edema  Current pain medications includes:  Lyrica 100 mg b i d     The patient reports that this regimen is providing mild pain relief  The patient is reporting no side effects from this pain medication regimen  I have personally reviewed and/or updated the patient's past medical history, past surgical history, family history, social history, current medications, allergies, and vital signs today  Review of Systems:    Review of Systems   Respiratory: Negative for shortness of breath  Cardiovascular: Negative for chest pain  Gastrointestinal: Positive for nausea  Negative for constipation, diarrhea and vomiting  Musculoskeletal: Negative for arthralgias, gait problem, joint swelling and myalgias  Skin: Negative for rash  Neurological: Negative for dizziness, seizures and weakness  All other systems reviewed and are negative          Past Medical History:   Diagnosis Date    Asthma     Chronic pain disorder     GERD (gastroesophageal reflux disease)     Hyperlipidemia     Liver abscess     Meniscus tear     left knee work injury last assessed 08/24/2016    Pancreatitis     Pneumonia        Past Surgical History:   Procedure Laterality Date    CELIAC PLEXUS BLOCK Bilateral 10/29/2018    Procedure: SPLANCHNIC NERVE BLOCK;  Surgeon: Dani Wang MD;  [facility];  Service: Pain Management     CELIAC PLEXUS BLOCK Bilateral 1/10/2019    Procedure: SPLANCHNIC NERVE BLOCK;  Surgeon: Dani Wang MD;  [facility];  Service: Pain Management     CHOLECYSTECTOMY      ESOPHAGOGASTRODUODENOSCOPY N/A 11/16/2017    Procedure: ESOPHAGOGASTRODUODENOSCOPY (EGD); Surgeon: Javier Villar MD;  [facility]; Service: Gastroenterology    ESOPHAGOGASTRODUODENOSCOPY N/A 4/19/2018    Procedure: ESOPHAGOGASTRODUODENOSCOPY (EGD); Surgeon: Grace Chino MD;  [facility]; Service: Gastroenterology    ESOPHAGOGASTRODUODENOSCOPY N/A 5/10/2018    Procedure: ESOPHAGOGASTRODUODENOSCOPY (EGD); Surgeon: Latha Hoyt MD;  [facility];   Service: Gastroenterology    IR TEMP HD CATH  2/28/2019    KNEE ARTHROSCOPY Bilateral     KNEE ARTHROSCOPY Right 2009    cibishino last assessed 08/24/2016    KNEE ARTHROSCOPY Right 07/01/2019    LIVER SURGERY      NERVE BLOCK Bilateral 5/29/2018    Procedure: SPLANCHNIC NERVE BLOCK;  Surgeon: Reymundo Tran MD;  [facility];  Service: Pain Management     PANCREAS SURGERY      stents    PANCREATIC CYST EXCISION      IN INJECT NERV [address] Bilateral 5/9/2017    Procedure: CELIAC PLEXUS BLOCK ;  Surgeon: Reymundo Tran MD;  [facility];  Service: Pain Management     IN INJECT NERV BLCK,CELIAC PLEXUS Bilateral 6/1/2017    Procedure: SPLANCHNIC NERVE BLOCK at T12;  Surgeon: Reymundo Tran MD;  [facility];  Service: Pain Management     IN INJECT NERV BLCK,CELIAC PLEXUS Bilateral 8/8/2017    Procedure: BILATERAL SPLANCHNIC NERVE BLOCK T12;  Surgeon: Reymundo Tran MD;  [facility];  Service: Pain Management     IN INJECT NERV BLCK,CELIAC PLEXUS Bilateral 4/18/2019    Procedure: BLOCK / INJECTION CELIAC PLEXUS;  Surgeon: Reymundo Tran MD;  [facility];  Service: Pain Management     IN INJECT NERV BLCK,CELIAC PLEXUS Bilateral 8/20/2019    Procedure: SPLANCHNIC NERVE BLOCK;  Surgeon: Reymundo Tran MD;  [facility];  Service: Pain Management     IN INJECT NERV BLCK,PARAVERT SYMPATH Bilateral 12/28/2017    Procedure: SPLANCHNIC NERVE BLOCK;  Surgeon: Matthew Vital MD;  [facility];  Service: Pain Management     AR LAP,CHOLECYSTECTOMY N/A 2/14/2017    Procedure: LAPAROSCOPIC CHOLECYSTECTOMY, IOC, POSSIBLE OPEN ;  Surgeon: Colleen Chiu MD;  [facility];  Service: General    ROTATOR CUFF REPAIR Right     SHOULDER ARTHROSCOPY      SHOULDER ARTHROSCOPY Right        Family History   Problem Relation Age of Onset    Cirrhosis Mother     Heart disease Other         cardiac disorder    Cancer Other        Social History     Occupational History    Occupation: fulltime employment   Tobacco Use    Smoking status: Current Every Day Smoker     Packs/day: 0 50     Years: 20 00     Pack years: 10 00    Smokeless tobacco: Never Used   Substance and Sexual Activity    Alcohol use:  Yes     Alcohol/week: 1 0 standard drinks     Types: 1 Cans of beer per week     Drinks per session: 3 or 4     Comment: rarely, occasionally, history of no alcohol use per allscripts    Drug use: No    Sexual activity: Yes     Partners: Female         Current Outpatient Medications:     albuterol (VENTOLIN HFA) 90 mcg/act inhaler, Inhale 2 puffs every 6 (six) hours as needed for wheezing Prn, Disp: 3 Inhaler, Rfl: 0    Choline Fenofibrate (FENOFIBRIC ACID) 135 MG CPDR, Take 1 capsule (135 mg total) by mouth daily, Disp: 90 capsule, Rfl: 3    omega-3-acid ethyl esters (LOVAZA) 1 g capsule, Take 2 capsules (2 g total) by mouth 2 (two) times a day, Disp: 360 capsule, Rfl: 0    ondansetron (ZOFRAN-ODT) 4 mg disintegrating tablet, Take 1 tablet (4 mg total) by mouth every 8 (eight) hours as needed for nausea or vomiting for up to 90 days, Disp: 30 tablet, Rfl: 0    pancrelipase, Lip-Prot-Amyl, (CREON) 12,000 units capsule, Take 12,000 units of lipase by mouth as needed for snacks, Disp: 90 capsule, Rfl: 2    Pancrelipase, Lip-Prot-Amyl, (CREON) 82872 units CPEP, Take 1 capsule (36,000 Units total) by mouth 3 (three) times a day before meals, Disp: 268 capsule, Rfl: 2    pantoprazole (PROTONIX) 40 mg tablet, Take 1 tablet (40 mg total) by mouth 2 (two) times a day for 90 days, Disp: 180 tablet, Rfl: 3    pregabalin (LYRICA) 100 mg capsule, Take 1 capsule (100 mg total) by mouth 3 (three) times a day, Disp: 90 capsule, Rfl: 1    ranitidine (ZANTAC) 150 MG capsule, Take 150 mg by mouth 2 (two) times a day, Disp: , Rfl:     rosuvastatin (CRESTOR) 40 MG tablet, Take 1 tablet (40 mg total) by mouth daily, Disp: 90 tablet, Rfl: 0    traZODone (DESYREL) 50 mg tablet, TAKE ONE TABLET BY MOUTH NIGHTLY AT BEDTIME , Disp: 30 tablet, Rfl: 1    EPINEPHrine (EPIPEN 2-ARIEL) 0 3 mg/0 3 mL SOAJ, EpiPen 2-Ariel 0 3 MG/0 3ML Injection Solution Auto-injector INJECT 0 3ML INTRAMUSCULARLY AS DIRECTED  Quantity: 1;  Refills: 1   Toro Briceño ; Active 2 Solution Auto-injector Pen, Disp: , Rfl:   No current facility-administered medications for this visit  Facility-Administered Medications Ordered in Other Visits:     oxyCODONE-acetaminophen (PERCOCET) 5-325 mg per tablet 2 tablet, 2 tablet, Oral, Once, Chilton Merlin, MD    Allergies   Allergen Reactions    Bee Venom Swelling       Physical Exam:    /82 (BP Location: Left arm, Patient Position: Sitting, Cuff Size: Large)   Pulse 80   Ht 5' 11" (1 803 m)   Wt 97 1 kg (214 lb)   BMI 29 85 kg/m²     Constitutional:normal, well developed, well nourished, alert, in no distress and non-toxic and no overt pain behavior    Eyes:anicteric  HEENT:grossly intact  Neck:supple, symmetric, trachea midline and no masses   Pulmonary:even and unlabored  Cardiovascular:No edema or pitting edema present  Skin:Normal without rashes or lesions and well hydrated  Psychiatric:Mood and affect appropriate  Neurologic:Cranial Nerves II-XII grossly intact  Musculoskeletal:normal      Imaging  FL spine and pain procedure    (Results Pending)         Orders Placed This Encounter   Procedures    FL spine and pain procedure

## 2019-09-20 NOTE — PROGRESS NOTES
Assessment:  1  Pancreatitis, unspecified pancreatitis type    2  Epigastric pain    3  Chronic pain syndrome        Plan:  Patient is a 49-year-old male with chronic pancreatitis status post bilateral splanchnic nerve block  He is stable on exam today however feels last injection was not as effective as previous injection in May of 2019  He would like to consider repeat nerve block in 8 weeks but would like to discuss with his wife 1st as he was concerned regarding recent emergency visit for hypertension  I reviewed with patient that hypotension is a possible side effect after procedure however patient feels this was unlike previous bouts of hypotension as he was difficult to arouse and describes near syncopal episode  In regards to increasing pain he was encouraged to start Lyrica 100 mg 3 times daily  If he does know any side effects such as lower extremity swelling he was advised to cut back medication to twice daily  He verbalizes understanding  [place] Prescription Drug Monitoring Program report was reviewed and was appropriate     Complete risks and benefits including bleeding, infection, tissue reaction, nerve injury and allergic reaction were discussed  The approach was demonstrated using models and literature was provided  Verbal and written consent was obtained  The patient will follow-up in 8 weeks for medication prescription refill and reevaluation  The patient was advised to contact the office should their symptoms worsen in the interim  The patient was agreeable and verbalized an understanding  History of Present Illness: The patient is a 53 y o  male last seen on 8/20/19 who presents for a follow up office visit in regards to chronic pain syndrome secondary to pancreatitis  The patient currently reports worsening symptoms since last visit  On 08/20/2019 he completed bilateral splenic nerve block and notes less pain relief than previous injections    He typically finds pain attacks are less intense when present after injections however find epigastric pain continues to be sharp, burning, and cramping  He rates pain as a 7/10 on pain scale today  He additionally notes extreme hypotension which led patient to ED and hospital admission after procedure  He is stable on exam today  He did not increase his Lyrica to 3 times daily as previously discussed and office as he was concerned for lower extremity edema  Current pain medications includes:  Lyrica 100 mg b i d     The patient reports that this regimen is providing mild pain relief  The patient is reporting no side effects from this pain medication regimen  I have personally reviewed and/or updated the patient's past medical history, past surgical history, family history, social history, current medications, allergies, and vital signs today  Review of Systems:    Review of Systems   Respiratory: Negative for shortness of breath  Cardiovascular: Negative for chest pain  Gastrointestinal: Positive for nausea  Negative for constipation, diarrhea and vomiting  Musculoskeletal: Negative for arthralgias, gait problem, joint swelling and myalgias  Skin: Negative for rash  Neurological: Negative for dizziness, seizures and weakness  All other systems reviewed and are negative          Past Medical History:   Diagnosis Date    Asthma     Chronic pain disorder     GERD (gastroesophageal reflux disease)     Hyperlipidemia     Liver abscess     Meniscus tear     left knee work injury last assessed 08/24/2016    Pancreatitis     Pneumonia        Past Surgical History:   Procedure Laterality Date    CELIAC PLEXUS BLOCK Bilateral 10/29/2018    Procedure: SPLANCHNIC NERVE BLOCK;  Surgeon: Liliana Chavez MD;  [facility];  Service: Pain Management     CELIAC PLEXUS BLOCK Bilateral 1/10/2019    Procedure: SPLANCHNIC NERVE BLOCK;  Surgeon: Liliana Chavez MD;  [facility];  Service: Pain Management     CHOLECYSTECTOMY      ESOPHAGOGASTRODUODENOSCOPY N/A 11/16/2017    Procedure: ESOPHAGOGASTRODUODENOSCOPY (EGD); Surgeon: Michael Rincon MD;  [facility]; Service: Gastroenterology    ESOPHAGOGASTRODUODENOSCOPY N/A 4/19/2018    Procedure: ESOPHAGOGASTRODUODENOSCOPY (EGD); Surgeon: Tod Longo MD;  [facility]; Service: Gastroenterology    ESOPHAGOGASTRODUODENOSCOPY N/A 5/10/2018    Procedure: ESOPHAGOGASTRODUODENOSCOPY (EGD); Surgeon: Sonia Phillip MD;  [facility];   Service: Gastroenterology    IR TEMP HD CATH  2/28/2019    KNEE ARTHROSCOPY Bilateral     KNEE ARTHROSCOPY Right 2009    cibishino last assessed 08/24/2016    KNEE ARTHROSCOPY Right 07/01/2019    LIVER SURGERY      NERVE BLOCK Bilateral 5/29/2018    Procedure: SPLANCHNIC NERVE BLOCK;  Surgeon: Lizzette Castro MD;  [facility];  Service: Pain Management     PANCREAS SURGERY      stents    PANCREATIC CYST EXCISION      [place] INJECT NERV [address] Bilateral 5/9/2017    Procedure: CELIAC PLEXUS BLOCK ;  Surgeon: Lizzette Castro MD;  [facility];  Service: Pain Management     AK INJECT NERV BLCK,CELIAC PLEXUS Bilateral 6/1/2017    Procedure: SPLANCHNIC NERVE BLOCK at T12;  Surgeon: Lizzette Castro MD;  [facility];  Service: Pain Management     AK INJECT NERV BLCK,CELIAC PLEXUS Bilateral 8/8/2017    Procedure: BILATERAL SPLANCHNIC NERVE BLOCK T12;  Surgeon: Lizzette Castro MD;  [facility];  Service: Pain Management     AK INJECT NERV BLCK,CELIAC PLEXUS Bilateral 4/18/2019    Procedure: BLOCK / INJECTION CELIAC PLEXUS;  Surgeon: Lizzette Castro MD;  [facility];  Service: Pain Management     AK INJECT NERV BLCK,CELIAC PLEXUS Bilateral 8/20/2019    Procedure: SPLANCHNIC NERVE BLOCK;  Surgeon: Lizzette Castro MD;  [facility];  Service: Pain Management     AK INJECT NERV BLCK,PARAVERT SYMPATH Bilateral 12/28/2017    Procedure: SPLANCHNIC NERVE BLOCK;  Surgeon: Bard Maribell MD;  [facility];  Service: Pain Management     OR LAP,CHOLECYSTECTOMY N/A 2/14/2017    Procedure: LAPAROSCOPIC CHOLECYSTECTOMY, IOC, POSSIBLE OPEN ;  Surgeon: Kristine Galaviz MD;  [facility];  Service: General    ROTATOR CUFF REPAIR Right     SHOULDER ARTHROSCOPY      SHOULDER ARTHROSCOPY Right        Family History   Problem Relation Age of Onset    Cirrhosis Mother     Heart disease Other         cardiac disorder    Cancer Other        Social History     Occupational History    Occupation: fulltime employment   Tobacco Use    Smoking status: Current Every Day Smoker     Packs/day: 0 50     Years: 20 00     Pack years: 10 00    Smokeless tobacco: Never Used   Substance and Sexual Activity    Alcohol use:  Yes     Alcohol/week: 1 0 standard drinks     Types: 1 Cans of beer per week     Drinks per session: 3 or 4     Comment: rarely, occasionally, history of no alcohol use per allscripts    Drug use: No    Sexual activity: Yes     Partners: Female         Current Outpatient Medications:     albuterol (VENTOLIN HFA) 90 mcg/act inhaler, Inhale 2 puffs every 6 (six) hours as needed for wheezing Prn, Disp: 3 Inhaler, Rfl: 0    Choline Fenofibrate (FENOFIBRIC ACID) 135 MG CPDR, Take 1 capsule (135 mg total) by mouth daily, Disp: 90 capsule, Rfl: 3    omega-3-acid ethyl esters (LOVAZA) 1 g capsule, Take 2 capsules (2 g total) by mouth 2 (two) times a day, Disp: 360 capsule, Rfl: 0    ondansetron (ZOFRAN-ODT) 4 mg disintegrating tablet, Take 1 tablet (4 mg total) by mouth every 8 (eight) hours as needed for nausea or vomiting for up to 90 days, Disp: 30 tablet, Rfl: 0    pancrelipase, Lip-Prot-Amyl, (CREON) 12,000 units capsule, Take 12,000 units of lipase by mouth as needed for snacks, Disp: 90 capsule, Rfl: 2    Pancrelipase, Lip-Prot-Amyl, (CREON) 92019 units CPEP, Take 1 capsule (36,000 Units total) by mouth 3 (three) times a day before meals, Disp: 268 capsule, Rfl: 2    pantoprazole (PROTONIX) 40 mg tablet, Take 1 tablet (40 mg total) by mouth 2 (two) times a day for 90 days, Disp: 180 tablet, Rfl: 3    pregabalin (LYRICA) 100 mg capsule, Take 1 capsule (100 mg total) by mouth 3 (three) times a day, Disp: 90 capsule, Rfl: 1    ranitidine (ZANTAC) 150 MG capsule, Take 150 mg by mouth 2 (two) times a day, Disp: , Rfl:     rosuvastatin (CRESTOR) 40 MG tablet, Take 1 tablet (40 mg total) by mouth daily, Disp: 90 tablet, Rfl: 0    traZODone (DESYREL) 50 mg tablet, TAKE ONE TABLET BY MOUTH NIGHTLY AT BEDTIME , Disp: 30 tablet, Rfl: 1    EPINEPHrine (EPIPEN 2-ARIEL) 0 3 mg/0 3 mL SOAJ, EpiPen 2-Ariel 0 3 MG/0 3ML Injection Solution Auto-injector INJECT 0 3ML INTRAMUSCULARLY AS DIRECTED  Quantity: 1;  Refills: 1   Toro Briceño ; Active 2 Solution Auto-injector Pen, Disp: , Rfl:   No current facility-administered medications for this visit  Facility-Administered Medications Ordered in Other Visits:     oxyCODONE-acetaminophen (PERCOCET) 5-325 mg per tablet 2 tablet, 2 tablet, Oral, Once, Dorita Gracia MD    Allergies   Allergen Reactions    Bee Venom Swelling       Physical Exam:    /82 (BP Location: Left arm, Patient Position: Sitting, Cuff Size: Large)   Pulse 80   Ht 5' 11" (1 803 m)   Wt 97 1 kg (214 lb)   BMI 29 85 kg/m²     Constitutional:normal, well developed, well nourished, alert, in no distress and non-toxic and no overt pain behavior    Eyes:anicteric  HEENT:grossly intact  Neck:supple, symmetric, trachea midline and no masses   Pulmonary:even and unlabored  Cardiovascular:No edema or pitting edema present  Skin:Normal without rashes or lesions and well hydrated  Psychiatric:Mood and affect appropriate  Neurologic:Cranial Nerves II-XII grossly intact  Musculoskeletal:normal      Imaging  FL spine and pain procedure    (Results Pending)         Orders Placed This Encounter   Procedures    FL spine and pain procedure

## 2019-10-04 ENCOUNTER — TELEPHONE (OUTPATIENT)
Dept: GASTROENTEROLOGY | Facility: CLINIC | Age: 44
End: 2019-10-04

## 2019-10-04 DIAGNOSIS — E78.1 HYPERTRIGLYCERIDEMIA: Primary | ICD-10-CM

## 2019-10-04 NOTE — TELEPHONE ENCOUNTER
----- Message from Garo Hoffman sent at 10/4/2019 12:43 PM EDT -----  Regarding: Referral Request  Contact: [phone]  I have finally found an endocrinologist willing to take me on  They are asking that I have a referral and that I send the past 3 months of my medical records and labs sent over to them  I do not know if you can help me with this  The doctor I am seeing is Naomi Sanon [phone]  [facility]  Any help would be appreciated

## 2019-10-04 NOTE — TELEPHONE ENCOUNTER
Spoke with pt, advised I placed referral for Endocrinologist  Sree Poole pt to have office fax us a medical release form, and we will send it to [facility] to get his records sent

## 2019-10-11 ENCOUNTER — TELEPHONE (OUTPATIENT)
Dept: RADIOLOGY | Facility: CLINIC | Age: 44
End: 2019-10-11

## 2019-10-11 PROBLEM — R10.13 ABDOMINAL PAIN, EPIGASTRIC: Status: ACTIVE | Noted: 2019-10-11

## 2019-10-11 NOTE — TELEPHONE ENCOUNTER
S/w Janna Arceo [address] [facility], [place] auth [place] [id number] [employer] NB  Call ref # [id number] 10/11/19 844am      Pt is scheduled on 10/17/19 8am @ [facility]

## 2019-10-15 DIAGNOSIS — G47.00 INSOMNIA, UNSPECIFIED TYPE: ICD-10-CM

## 2019-10-15 RX ORDER — ONDANSETRON 4 MG/1
TABLET, ORALLY DISINTEGRATING ORAL
Qty: 30 TABLET | Refills: 0 | Status: SHIPPED | OUTPATIENT
Start: 2019-10-15 | End: 2020-01-06 | Stop reason: SDUPTHER

## 2019-10-16 ENCOUNTER — ANESTHESIA EVENT (OUTPATIENT)
Dept: PERIOP | Facility: HOSPITAL | Age: 44
End: 2019-10-16
Payer: COMMERCIAL

## 2019-10-17 ENCOUNTER — ANESTHESIA (OUTPATIENT)
Dept: PERIOP | Facility: HOSPITAL | Age: 44
End: 2019-10-17
Payer: COMMERCIAL

## 2019-10-17 ENCOUNTER — HOSPITAL ENCOUNTER (OUTPATIENT)
Facility: HOSPITAL | Age: 44
Setting detail: OUTPATIENT SURGERY
Discharge: HOME | End: 2019-10-17
Attending: ANESTHESIOLOGY | Admitting: ANESTHESIOLOGY
Payer: COMMERCIAL

## 2019-10-17 ENCOUNTER — APPOINTMENT (OUTPATIENT)
Dept: RADIOLOGY | Facility: HOSPITAL | Age: 44
End: 2019-10-17
Payer: COMMERCIAL

## 2019-10-17 VITALS
HEIGHT: 71 IN | HEART RATE: 87 BPM | RESPIRATION RATE: 20 BRPM | OXYGEN SATURATION: 94 % | TEMPERATURE: 98.1 F | WEIGHT: 213.19 LBS | BODY MASS INDEX: 29.85 KG/M2 | DIASTOLIC BLOOD PRESSURE: 80 MMHG | SYSTOLIC BLOOD PRESSURE: 139 MMHG

## 2019-10-17 DIAGNOSIS — G89.4 CHRONIC PAIN SYNDROME: Primary | ICD-10-CM

## 2019-10-17 PROCEDURE — 64680 INJECTION TREATMENT OF NERVE: CPT | Performed by: ANESTHESIOLOGY

## 2019-10-17 RX ORDER — LIDOCAINE HYDROCHLORIDE 10 MG/ML
0.5 INJECTION, SOLUTION EPIDURAL; INFILTRATION; INTRACAUDAL; PERINEURAL ONCE AS NEEDED
Status: DISCONTINUED | OUTPATIENT
Start: 2019-10-17 | End: 2019-10-17 | Stop reason: HOSPADM

## 2019-10-17 RX ORDER — OXYCODONE AND ACETAMINOPHEN 7.5; 325 MG/1; MG/1
1 TABLET ORAL EVERY 8 HOURS PRN
Qty: 15 TABLET | Refills: 0 | Status: SHIPPED | OUTPATIENT
Start: 2019-10-17 | End: 2019-10-22

## 2019-10-17 RX ORDER — PROPOFOL 10 MG/ML
INJECTION, EMULSION INTRAVENOUS CONTINUOUS PRN
Status: DISCONTINUED | OUTPATIENT
Start: 2019-10-17 | End: 2019-10-17 | Stop reason: SURG

## 2019-10-17 RX ORDER — HYDROMORPHONE HCL/PF 1 MG/ML
0.5 SYRINGE (ML) INJECTION
Status: COMPLETED | OUTPATIENT
Start: 2019-10-17 | End: 2019-10-17

## 2019-10-17 RX ORDER — 0.9 % SODIUM CHLORIDE 0.9 %
VIAL (ML) INJECTION AS NEEDED
Status: DISCONTINUED | OUTPATIENT
Start: 2019-10-17 | End: 2019-10-17 | Stop reason: HOSPADM

## 2019-10-17 RX ORDER — PROPOFOL 10 MG/ML
INJECTION, EMULSION INTRAVENOUS AS NEEDED
Status: DISCONTINUED | OUTPATIENT
Start: 2019-10-17 | End: 2019-10-17 | Stop reason: SURG

## 2019-10-17 RX ORDER — BUPIVACAINE HYDROCHLORIDE 2.5 MG/ML
INJECTION, SOLUTION INFILTRATION; PERINEURAL AS NEEDED
Status: DISCONTINUED | OUTPATIENT
Start: 2019-10-17 | End: 2019-10-17 | Stop reason: HOSPADM

## 2019-10-17 RX ORDER — DEXAMETHASONE SODIUM PHOSPHATE 10 MG/ML
INJECTION, SOLUTION INTRAMUSCULAR; INTRAVENOUS AS NEEDED
Status: DISCONTINUED | OUTPATIENT
Start: 2019-10-17 | End: 2019-10-17 | Stop reason: HOSPADM

## 2019-10-17 RX ORDER — LIDOCAINE HYDROCHLORIDE 20 MG/ML
INJECTION, SOLUTION INFILTRATION; PERINEURAL AS NEEDED
Status: DISCONTINUED | OUTPATIENT
Start: 2019-10-17 | End: 2019-10-17 | Stop reason: HOSPADM

## 2019-10-17 RX ORDER — METOCLOPRAMIDE HYDROCHLORIDE 5 MG/ML
10 INJECTION INTRAMUSCULAR; INTRAVENOUS ONCE AS NEEDED
Status: DISCONTINUED | OUTPATIENT
Start: 2019-10-17 | End: 2019-10-17 | Stop reason: HOSPADM

## 2019-10-17 RX ORDER — SODIUM CHLORIDE, SODIUM LACTATE, POTASSIUM CHLORIDE, CALCIUM CHLORIDE 600; 310; 30; 20 MG/100ML; MG/100ML; MG/100ML; MG/100ML
125 INJECTION, SOLUTION INTRAVENOUS CONTINUOUS
Status: DISCONTINUED | OUTPATIENT
Start: 2019-10-17 | End: 2019-10-17 | Stop reason: HOSPADM

## 2019-10-17 RX ORDER — LIDOCAINE HYDROCHLORIDE 10 MG/ML
INJECTION, SOLUTION INFILTRATION; PERINEURAL AS NEEDED
Status: DISCONTINUED | OUTPATIENT
Start: 2019-10-17 | End: 2019-10-17 | Stop reason: SURG

## 2019-10-17 RX ORDER — MIDAZOLAM HYDROCHLORIDE 1 MG/ML
INJECTION INTRAMUSCULAR; INTRAVENOUS AS NEEDED
Status: DISCONTINUED | OUTPATIENT
Start: 2019-10-17 | End: 2019-10-17 | Stop reason: SURG

## 2019-10-17 RX ORDER — DIPHENHYDRAMINE HYDROCHLORIDE 50 MG/ML
12.5 INJECTION INTRAMUSCULAR; INTRAVENOUS ONCE AS NEEDED
Status: DISCONTINUED | OUTPATIENT
Start: 2019-10-17 | End: 2019-10-17 | Stop reason: HOSPADM

## 2019-10-17 RX ORDER — FENTANYL CITRATE/PF 50 MCG/ML
50 SYRINGE (ML) INJECTION
Status: DISCONTINUED | OUTPATIENT
Start: 2019-10-17 | End: 2019-10-17 | Stop reason: HOSPADM

## 2019-10-17 RX ORDER — MEPERIDINE HYDROCHLORIDE 50 MG/ML
12.5 INJECTION INTRAMUSCULAR; INTRAVENOUS; SUBCUTANEOUS ONCE AS NEEDED
Status: DISCONTINUED | OUTPATIENT
Start: 2019-10-17 | End: 2019-10-17 | Stop reason: HOSPADM

## 2019-10-17 RX ORDER — PROMETHAZINE HYDROCHLORIDE 25 MG/ML
25 INJECTION, SOLUTION INTRAMUSCULAR; INTRAVENOUS ONCE AS NEEDED
Status: DISCONTINUED | OUTPATIENT
Start: 2019-10-17 | End: 2019-10-17 | Stop reason: HOSPADM

## 2019-10-17 RX ORDER — FENTANYL CITRATE 50 UG/ML
INJECTION, SOLUTION INTRAMUSCULAR; INTRAVENOUS AS NEEDED
Status: DISCONTINUED | OUTPATIENT
Start: 2019-10-17 | End: 2019-10-17 | Stop reason: SURG

## 2019-10-17 RX ADMIN — SODIUM CHLORIDE, SODIUM LACTATE, POTASSIUM CHLORIDE, AND CALCIUM CHLORIDE 125 ML/HR: .6; .31; .03; .02 INJECTION, SOLUTION INTRAVENOUS at 07:58

## 2019-10-17 RX ADMIN — MIDAZOLAM HYDROCHLORIDE 2 MG: 1 INJECTION, SOLUTION INTRAMUSCULAR; INTRAVENOUS at 08:16

## 2019-10-17 RX ADMIN — PROPOFOL 120 MCG/KG/MIN: 10 INJECTION, EMULSION INTRAVENOUS at 08:24

## 2019-10-17 RX ADMIN — PROPOFOL 50 MG: 10 INJECTION, EMULSION INTRAVENOUS at 08:33

## 2019-10-17 RX ADMIN — PROPOFOL 100 MG: 10 INJECTION, EMULSION INTRAVENOUS at 08:24

## 2019-10-17 RX ADMIN — HYDROMORPHONE HYDROCHLORIDE 0.5 MG: 1 INJECTION, SOLUTION INTRAMUSCULAR; INTRAVENOUS; SUBCUTANEOUS at 09:39

## 2019-10-17 RX ADMIN — FENTANYL CITRATE 50 MCG: 50 INJECTION, SOLUTION INTRAMUSCULAR; INTRAVENOUS at 08:24

## 2019-10-17 RX ADMIN — FENTANYL CITRATE 50 MCG: 50 INJECTION, SOLUTION INTRAMUSCULAR; INTRAVENOUS at 08:26

## 2019-10-17 RX ADMIN — PROPOFOL 50 MG: 10 INJECTION, EMULSION INTRAVENOUS at 08:40

## 2019-10-17 RX ADMIN — HYDROMORPHONE HYDROCHLORIDE 0.5 MG: 1 INJECTION, SOLUTION INTRAMUSCULAR; INTRAVENOUS; SUBCUTANEOUS at 09:25

## 2019-10-17 RX ADMIN — HYDROMORPHONE HYDROCHLORIDE 0.5 MG: 1 INJECTION, SOLUTION INTRAMUSCULAR; INTRAVENOUS; SUBCUTANEOUS at 09:00

## 2019-10-17 RX ADMIN — HYDROMORPHONE HYDROCHLORIDE 0.5 MG: 1 INJECTION, SOLUTION INTRAMUSCULAR; INTRAVENOUS; SUBCUTANEOUS at 09:15

## 2019-10-17 RX ADMIN — PROPOFOL 50 MG: 10 INJECTION, EMULSION INTRAVENOUS at 08:36

## 2019-10-17 RX ADMIN — PROPOFOL 50 MG: 10 INJECTION, EMULSION INTRAVENOUS at 08:28

## 2019-10-17 RX ADMIN — LIDOCAINE HYDROCHLORIDE 50 MG: 10 INJECTION, SOLUTION INFILTRATION; PERINEURAL at 08:24

## 2019-10-17 RX ADMIN — PROPOFOL 50 MG: 10 INJECTION, EMULSION INTRAVENOUS at 08:38

## 2019-10-17 NOTE — OP NOTE
OPERATIVE REPORT  PATIENT NAME: Shanika Srinivasan    :  1975  MRN: [id number]  Pt Location: [facility]    SURGERY DATE: 10/17/2019    Surgeon(s) and Role:     * Sanjuana Martinez MD - Primary    Preop Diagnosis:  Pancreatitis, unspecified pancreatitis type [K85 90]  Abdominal pain, epigastric [R10 13]    Post-Op Diagnosis Codes:     * Pancreatitis, unspecified pancreatitis type [K85 90]     * Abdominal pain, epigastric [R10 13]    Procedure(s) (LRB):  SPLANCHNIC NERVE BLOCK (Bilateral)    Specimen(s):  * No specimens in log *    Estimated Blood Loss:   Minimal    Drains:  * No LDAs found *    Anesthesia Type:   IV Sedation with Anesthesia    Operative Indications:  Pancreatitis, unspecified pancreatitis type [K85 90]  Abdominal pain, epigastric [R10 13]    Operative Findings:  None    Complications:   None    Procedure and Technique: BILATERAL SPLANCHNIC NERVE BLOCK      MEDICATIONS INJECTED: 10 mL of bupivacaine 0 25% (5 mL per side) plus 20mg of dexamethasone (10mg per side) in 5cc of sterile saline and 5 mL of Omnipaque 240 contrast     OTHER LOCAL ANESTHETIC USED: 10 mL of 2% lidocaine      ESTIMATED BLOOD LOSS: None  COMPLICATIONS: None      TECHNIQUE: Time-out was taken to identify the correct patient, procedure and side prior to starting the procedure  With the patient lying prone, the area to be injected was widely prepped and draped in the usual sterile fashion using DuraPrep and a drape  The anatomical target site was determined using fluoroscopy by squaring off the superior endplate of S64, ipsilaterally obliquing the C-arm intensifier until the tip of the T11 transverse process was at the anterolateral border of the T11 vertebral body, and then moving the C-arm intensifier caudal to move the Transverse process out of the fluoroscopic target view  Local anesthetic 2% lidocaine 2cc's was given by raising a skin wheal and going down to the hub of the 27-gauge 1 25-inch needle   A 22-gauge 5-inch Quincke needle was advanced at the midbody of T11 to a point in the anterior one-third of the T11 vertebral body utilizing A-P and lateral intermittent fluoroscopy and also utilizing digital subtraction  5 mL of Omnipaque 240 contrast was injected to show unilateral spread along the anterolateral surface of the T11 vertebral body, and no vascular uptake       The above technique was then repeated for the opposite side with contrast injected again to show unilateral spread superior and inferior along the anterolateral T11 vertebral body  Medication was then injected slowly in 3 mL increments after negative aspirations of the needle to confirm the needle had not slipped intravascular       The procedure was completed without complications and was tolerated well  The patient was monitored after the procedure  The patient and his wife were given post-procedure and discharge instructions to follow at home  The patient was discharged in stable condition  The patient will call the office to make a follow up appointment in 6 weeks      I was present for the entire procedure    Patient Disposition:  PACU     SIGNATURE: Grayson Pink MD  DATE: October 17, 2019  TIME: 8:54 AM

## 2019-10-17 NOTE — DISCHARGE SUMMARY
1  Do not apply heat to any area that is numb  If you have discomfort or soreness at the injection site, you may apply ice today, 20 minutes on and 20 minutes off  Tomorrow you may use ice or warm, moist heat  Do not apply ice or heat directly to the skin  2  If you experience severe shortness of breath, go to the Emergency Room  3  You may have numbness for several hours from the local anesthetic  Please use caution and common sense, especially with weight-bearing activities  4  You may have an increase or change in the discomfort for 36-48 hours after your treatment  Apply ice and continue with any pain medicine you have been prescribed  5  Do not do anything strenuous today  You may shower, but no tub baths or hot tubs today  You may resume your normal activities tomorrow, but do not overdo it  Resume normal activities slowly when you are feeling better  6  If you experience redness, drainage or swelling at the injection site, or if you develop a fever above 100 degrees, please call [facility] at [phone] or go to the Emergency Room  7  Continue to take all routine medicines prescribed by your primary care physician unless otherwise instructed by our staff  Most blood thinners should be started again according to your regularly scheduled dosing  If you have any questions, please give our office a call  If you have a problem specifically related to your procedure, please call our office at [phone]  Problems not related to your procedure should be directed to your primary care physician

## 2019-10-17 NOTE — INTERVAL H&P NOTE
H&P reviewed  After examining the patient I find no changes in the patients condition since the H&P had been written      Vitals:    10/17/19 0725   BP: 165/92   Pulse: 84   Resp: 18   Temp: 98 °F (36 7 °C)   SpO2: 95%

## 2019-10-17 NOTE — H&P
History of Present Illness: The patient is a 53 y o  male who presents with complaints of chronic abdominal pain  Patient Active Problem List   Diagnosis    Pancreatic lesion    Renal mass    Encounter for smoking cessation counseling    Leukocytosis    RBBB    Epigastric pain    Acute gastritis without hemorrhage    Abdominal pain    Abnormal transaminases    Acute on chronic pancreatitis (HCC)    Leukopenia    Hypertriglyceridemia    Chronic pain syndrome    Esophagitis    Other chronic pancreatitis (HCC)    Chronic gastritis without bleeding    Uncomplicated opioid dependence ([address] )    Long-term current use of opiate analgesic    GERD (gastroesophageal reflux disease)    Hyponatremia    Healthcare maintenance    Elevated blood pressure reading    Upper abdominal pain    Hypertension    Pancreatitis, unspecified pancreatitis type    Abdominal pain, epigastric       Past Medical History:   Diagnosis Date    Asthma     Chronic pain disorder     GERD (gastroesophageal reflux disease)     Hyperlipidemia     Liver abscess     Meniscus tear     left knee work injury last assessed 08/24/2016    Pancreatitis     Pneumonia        Past Surgical History:   Procedure Laterality Date    CELIAC PLEXUS BLOCK Bilateral 10/29/2018    Procedure: SPLANCHNIC NERVE BLOCK;  Surgeon: Cesar Longo MD;  [facility];  Service: Pain Management     CELIAC PLEXUS BLOCK Bilateral 1/10/2019    Procedure: SPLANCHNIC NERVE BLOCK;  Surgeon: Cesar Longo MD;  [facility];  Service: Pain Management     CHOLECYSTECTOMY      ESOPHAGOGASTRODUODENOSCOPY N/A 11/16/2017    Procedure: ESOPHAGOGASTRODUODENOSCOPY (EGD); Surgeon: Sathish Davenport MD;  [facility]; Service: Gastroenterology    ESOPHAGOGASTRODUODENOSCOPY N/A 4/19/2018    Procedure: ESOPHAGOGASTRODUODENOSCOPY (EGD); Surgeon: Gabriela Ascencio MD;  [facility];   Service: Gastroenterology    ESOPHAGOGASTRODUODENOSCOPY N/A 5/10/2018    Procedure: ESOPHAGOGASTRODUODENOSCOPY (EGD); Surgeon: Natividad Contreras MD;  [facility];   Service: Gastroenterology    IR TEMP HD CATH  2/28/2019    KNEE ARTHROSCOPY Bilateral     KNEE ARTHROSCOPY Right 2009    cibishino last assessed 08/24/2016    KNEE ARTHROSCOPY Right 07/01/2019    LIVER SURGERY      NERVE BLOCK Bilateral 5/29/2018    Procedure: SPLANCHNIC NERVE BLOCK;  Surgeon: Porsha Smith MD;  [facility];  Service: Pain Management     PANCREAS SURGERY      stents    PANCREATIC CYST EXCISION      [place]JECT NERV [address] Bilateral 5/9/2017    Procedure: CELIAC PLEXUS BLOCK ;  Surgeon: Porsha Smith MD;  [facility];  Service: Pain Management     VA INJECT NERV BLCK,CELIAC PLEXUS Bilateral 6/1/2017    Procedure: SPLANCHNIC NERVE BLOCK at T12;  Surgeon: Porsha Smith MD;  [facility];  Service: Pain Management     VA INJECT NERV BLCK,CELIAC PLEXUS Bilateral 8/8/2017    Procedure: BILATERAL SPLANCHNIC NERVE BLOCK T12;  Surgeon: Porsha Smith MD;  [facility];  Service: Pain Management     VA INJECT NERV BLCK,CELIAC PLEXUS Bilateral 4/18/2019    Procedure: BLOCK / INJECTION CELIAC PLEXUS;  Surgeon: Porsha Smith MD;  [facility];  Service: Pain Management     VA INJECT NERV BLCK,CELIAC PLEXUS Bilateral 8/20/2019    Procedure: SPLANCHNIC NERVE BLOCK;  Surgeon: Porsha Smith MD;  [facility];  Service: Pain Management     VA INJECT NERV Marinell Arce Bilateral 12/28/2017    Procedure: SPLANCHNIC NERVE BLOCK;  Surgeon: Porsha Smith MD;  [facility];  Service: Pain Management     [place] LAP,CHOLECYSTECTOMY N/A 2/14/2017    Procedure: LAPAROSCOPIC CHOLECYSTECTOMY, IOC, POSSIBLE OPEN ;  Surgeon: Zack Slade MD;  [facility];  Service: General    ROTATOR CUFF REPAIR Right     SHOULDER ARTHROSCOPY      SHOULDER ARTHROSCOPY Right          Current Facility-Administered Medications:    lactated ringers infusion, 125 mL/hr, Intravenous, Continuous, Sameera Krishnamurthy MD, Last Rate: 125 mL/hr at 10/17/19 0758, 125 mL/hr at 10/17/19 0758    lidocaine (PF) (XYLOCAINE-MPF) 1 % injection 0 5 mL, 0 5 mL, Infiltration, Once PRN, Sameera Krishnamurthy MD    Facility-Administered Medications Ordered in Other Encounters:     oxyCODONE-acetaminophen (PERCOCET) 5-325 mg per tablet 2 tablet, 2 tablet, Oral, Once, Faviola Rubio MD    Allergies   Allergen Reactions    Bee Venom Swelling       Physical Exam:   Vitals:    10/17/19 0725   BP: 165/92   Pulse: 84   Resp: 18   Temp: 98 °F (36 7 °C)   SpO2: 95%     General: Awake, Alert, Oriented x 3, Mood and affect appropriate  Respiratory: Respirations even and unlabored  Cardiovascular: Peripheral pulses intact; no edema  Musculoskeletal Exam: wnl    ASA Score: 2    Patient/Chart Verification  Patient ID Verified: Verbal, Armband  ID Band Applied: Yes  Consents Confirmed: Anesthesia  H&P( within 30 days) Verified: Yes  Beta Blocker given : No  Pre-op Lab/Test Results Available: N/A  Does Patient Have a Prosthetic Device/Implant: Yes, see comment(Pins rt shoulder)  Advance Directive: Patient does not have advance directive - would not like information    Assessment: Chronic Abdominal Pain/Pancreatitis    Plan: Bilateral Splanchnic Nerve Block

## 2019-10-17 NOTE — DISCHARGE INSTRUCTIONS
1  Do not apply heat to any area that is numb  If you have discomfort or soreness at the injection site, you may apply ice today, 20 minutes on and 20 minutes off  Tomorrow you may use ice or warm, moist heat  Do not apply ice or heat directly to the skin  2  If you experience severe shortness of breath, go to the Emergency Room  3  You may have numbness for several hours from the local anesthetic  Please use caution and common sense, especially with weight-bearing activities  4  You may have an increase or change in the discomfort for 36-48 hours after your treatment  Apply ice and continue with any pain medicine you have been prescribed  5  Do not do anything strenuous today  You may shower, but no tub baths or hot tubs today  You may resume your normal activities tomorrow, but do not overdo it  Resume normal activities slowly when you are feeling better  6  If you experience redness, drainage or swelling at the injection site, or if you develop a fever above 100 degrees, please call [facility] at [phone] or go to the Emergency Room  7  Continue to take all routine medicines prescribed by your primary care physician unless otherwise instructed by our staff  Most blood thinners should be started again according to your regularly scheduled dosing  If you have any questions, please give our office a call      If you have a problem specifically related to your procedure, please call our office at [phone]  Problems not related to your procedure should be directed to your primary care physician      Abdominal Pain   WHAT YOU NEED TO KNOW:   Abdominal pain can be dull, achy, or sharp  You may have pain in one area of your abdomen, or in your entire abdomen  Your pain may be caused by a condition such as constipation, food sensitivity or poisoning, infection, or a blockage  Abdominal pain can also be from a hernia, appendicitis, or an ulcer   Liver, gallbladder, or kidney conditions can also cause abdominal pain  The cause of your abdominal pain may be unknown  DISCHARGE INSTRUCTIONS:   Return to the emergency department if:   · You have new chest pain or shortness of breath  · You have pulsing pain in your upper abdomen or lower back that suddenly becomes constant  · Your pain is in the right lower abdominal area and worsens with movement  · You have a fever over 100 4°F (38°C) or shaking chills  · You are vomiting and cannot keep food or liquids down  · Your pain does not improve or gets worse over the next 8 to 12 hours  · You see blood in your vomit or bowel movements, or they look black and tarry  · Your skin or the whites of your eyes turn yellow  · You are a woman and have a large amount of vaginal bleeding that is not your monthly period  Contact your healthcare provider if:   · You have pain in your lower back  · You are a man and have pain in your testicles  · You have pain when you urinate  · You have questions or concerns about your condition or care  Follow up with your healthcare provider within 24 hours or as directed:  Write down your questions so you remember to ask them during your visits  Medicines:   · Medicines  may be given to calm your stomach and prevent vomiting or to decrease pain  Ask how to take pain medicine safely  · Take your medicine as directed  Contact your healthcare provider if you think your medicine is not helping or if you have side effects  Tell him of her if you are allergic to any medicine  Keep a list of the medicines, vitamins, and herbs you take  Include the amounts, and when and why you take them  Bring the list or the pill bottles to follow-up visits  Carry your medicine list with you in case of an emergency  © 2017 [employer] Information is for End User's use only and may not be sold, redistributed or otherwise used for commercial purposes   All illustrations and images included in [facility] are the copyrighted property of [employer]  or [employer]  The above information is an  only  It is not intended as medical advice for individual conditions or treatments  Talk to your doctor, nurse or pharmacist before following any medical regimen to see if it is safe and effective for you

## 2019-10-17 NOTE — ANESTHESIA POSTPROCEDURE EVALUATION
Post-Op Assessment Note    CV Status:  Stable  Pain Score: 2    Pain management: adequate     Mental Status:  Alert and awake   Hydration Status:  Euvolemic   PONV Controlled:  Controlled   Airway Patency:  Patent   Post Op Vitals Reviewed: Yes      Staff: CRNA           /71 (10/17/19 0849)    Temp 98 °F (36 7 °C) (10/17/19 0849)    Pulse 83 (10/17/19 0849)   Resp (!) 9 (10/17/19 0849)    SpO2 95 % (10/17/19 0849)

## 2019-10-17 NOTE — ANESTHESIA PREPROCEDURE EVALUATION
Review of Systems/Medical History          Cardiovascular  Hyperlipidemia, Hypertension , Dysrhythmias ,    Pulmonary  Pneumonia, Asthma ,        GI/Hepatic    GERD , Liver disease , Pancreatic problem,             Endo/Other     GYN       Hematology   Musculoskeletal       Neurology   Psychology           Physical Exam    Airway    Mallampati score: II         Dental   No notable dental hx     Cardiovascular      Pulmonary      Other Findings        Anesthesia Plan  ASA Score- 3     Anesthesia Type- IV sedation with anesthesia with ASA Monitors  Additional Monitors:   Airway Plan:     Comment: I, Dr Mary Ann Kenyon, the attending physician, have personally seen and evaluated the patient prior to anesthetic care  I have reviewed the pre-anesthetic record, and other medical records if appropriate to the anesthetic care  If a CRNA is involved in the case, I have reviewed the CRNA assessment, if present, and agree  The patient is in a suitable condition to proceed with my formulated anesthetic plan        Plan Factors-    Induction- intravenous  Postoperative Plan-     Informed Consent- Anesthetic plan and risks discussed with patient  I personally reviewed this patient with the CRNA  Discussed and agreed on the Anesthesia Plan with the CRNA  Titus Apodaca

## 2019-11-06 ENCOUNTER — OFFICE VISIT (OUTPATIENT)
Dept: PAIN MEDICINE | Facility: CLINIC | Age: 44
End: 2019-11-06
Payer: COMMERCIAL

## 2019-11-06 VITALS
RESPIRATION RATE: 20 BRPM | HEIGHT: 71 IN | HEART RATE: 100 BPM | BODY MASS INDEX: 30.15 KG/M2 | DIASTOLIC BLOOD PRESSURE: 83 MMHG | SYSTOLIC BLOOD PRESSURE: 128 MMHG | WEIGHT: 215.4 LBS

## 2019-11-06 DIAGNOSIS — K85.90 ACUTE ON CHRONIC PANCREATITIS (HCC): ICD-10-CM

## 2019-11-06 DIAGNOSIS — K86.1 ACUTE ON CHRONIC PANCREATITIS (HCC): ICD-10-CM

## 2019-11-06 DIAGNOSIS — K86.1 OTHER CHRONIC PANCREATITIS (HCC): Primary | ICD-10-CM

## 2019-11-06 DIAGNOSIS — K85.90 PANCREATITIS, UNSPECIFIED PANCREATITIS TYPE: ICD-10-CM

## 2019-11-06 DIAGNOSIS — G89.4 CHRONIC PAIN SYNDROME: ICD-10-CM

## 2019-11-06 PROCEDURE — 99213 OFFICE O/P EST LOW 20 MIN: CPT | Performed by: ANESTHESIOLOGY

## 2019-11-06 RX ORDER — PREGABALIN 100 MG/1
100 CAPSULE ORAL 3 TIMES DAILY
Qty: 90 CAPSULE | Refills: 2 | Status: SHIPPED | OUTPATIENT
Start: 2019-11-06 | End: 2020-03-13

## 2019-11-06 RX ORDER — FAMOTIDINE 40 MG/1
40 TABLET, FILM COATED ORAL
COMMUNITY

## 2019-11-06 NOTE — PROGRESS NOTES
Assessment:  1  Other chronic pancreatitis ([facility] [address] )     2  Acute on chronic pancreatitis ([facility] [address] )     3  Pancreatitis, unspecified pancreatitis type     4  Chronic pain syndrome  pregabalin (LYRICA) 100 mg capsule       Plan: This is a 43-year-old male with history of chronic abdominal pain due to pancreatitis  Patient has had a series of bilateral splanchnic nerve blocks with excellent pain relief times 3-4 month duration  Patient's pain is currently stable  I will refill Lyrica 100 mg p o  T i d  At this time, patient will schedule follow-up with me as needed  If his pain returns, we will schedule repeat bilateral splanchnic nerve block  He verbalized understanding  My impressions and treatment recommendations were discussed in detail with the patient who verbalized understanding and had no further questions  Discharge instructions were provided  I personally saw and examined the patient and I agree with the above discussed plan of care  New Medications Ordered This Visit   Medications    famotidine (PEPCID) 40 MG tablet     Sig: Take 40 mg by mouth 2 (two) times a day       History of Present Illness:  Hugo Aldrich is a 53 y o  male who presents for a follow up office visit in regards to abdominal pain due to chronic pancreatitis  The patients current symptoms include intermittent, burning, sharp abdominal pain  Patient had a bilateral splanchnic nerve block  He reports feeling a lot better  Today, he rates his pain 6/10  No recent changes in health  Patient's pain is managed with Lyrica 100 mg p o  T i d  I have personally reviewed and/or updated the patient's past medical history, past surgical history, family history, social history, current medications, allergies, and vital signs today  Review of Systems   Respiratory: Negative for shortness of breath  Cardiovascular: Negative for chest pain  Gastrointestinal: Positive for nausea and vomiting   Negative for constipation and diarrhea  Musculoskeletal: Negative for arthralgias, gait problem, joint swelling and myalgias  Skin: Negative for rash  Neurological: Negative for dizziness, seizures and weakness  All other systems reviewed and are negative  Patient Active Problem List   Diagnosis    Pancreatic lesion    Renal mass    Encounter for smoking cessation counseling    Leukocytosis    RBBB    Epigastric pain    Acute gastritis without hemorrhage    Abdominal pain    Abnormal transaminases    Acute on chronic pancreatitis (HCC)    Leukopenia    Hypertriglyceridemia    Chronic pain syndrome    Esophagitis    Other chronic pancreatitis (HCC)    Chronic gastritis without bleeding    Uncomplicated opioid dependence ([facility] [address] )    Long-term current use of opiate analgesic    GERD (gastroesophageal reflux disease)    Hyponatremia    Healthcare maintenance    Elevated blood pressure reading    Upper abdominal pain    Hypertension    Pancreatitis, unspecified pancreatitis type    Abdominal pain, epigastric       Past Medical History:   Diagnosis Date    Asthma     Chronic pain disorder     GERD (gastroesophageal reflux disease)     Hyperlipidemia     Liver abscess     Meniscus tear     left knee work injury last assessed 08/24/2016    Pancreatitis     Pneumonia        Past Surgical History:   Procedure Laterality Date    CELIAC PLEXUS BLOCK Bilateral 10/29/2018    Procedure: SPLANCHNIC NERVE BLOCK;  Surgeon: Rosette Watson MD;  [facility];  Service: Pain Management     CELIAC PLEXUS BLOCK Bilateral 1/10/2019    Procedure: SPLANCHNIC NERVE BLOCK;  Surgeon: Rosette Watson MD;  [facility];  Service: Pain Management     CHOLECYSTECTOMY      ESOPHAGOGASTRODUODENOSCOPY N/A 11/16/2017    Procedure: ESOPHAGOGASTRODUODENOSCOPY (EGD); Surgeon: Rangel Pérez MD;  [facility];   Service: Gastroenterology    ESOPHAGOGASTRODUODENOSCOPY N/A 4/19/2018    Procedure: ESOPHAGOGASTRODUODENOSCOPY (EGD); Surgeon: Serge Santillan MD;  [facility]; Service: Gastroenterology    ESOPHAGOGASTRODUODENOSCOPY N/A 5/10/2018    Procedure: ESOPHAGOGASTRODUODENOSCOPY (EGD); Surgeon: Javier Griffiths MD;  [facility];   Service: Gastroenterology    IR TEMP HD CATH  2/28/2019    KNEE ARTHROSCOPY Bilateral     KNEE ARTHROSCOPY Right 2009    cibishino last assessed 08/24/2016    KNEE ARTHROSCOPY Right 07/01/2019    LIVER SURGERY      NERVE BLOCK Bilateral 5/29/2018    Procedure: SPLANCHNIC NERVE BLOCK;  Surgeon: Brittany Aldana MD;  [facility];  Service: Pain Management     PANCREAS SURGERY      stents    PANCREATIC CYST EXCISION      AZ INJECT NERV BLCK,CELIAC PLEXUS Bilateral 5/9/2017    Procedure: CELIAC PLEXUS BLOCK ;  Surgeon: Brittany Aldana MD;  [facility];  Service: Pain Management     AZ INJECT NERV BLCK,CELIAC PLEXUS Bilateral 6/1/2017    Procedure: SPLANCHNIC NERVE BLOCK at T12;  Surgeon: Brittany Aldana MD;  [facility];  Service: Pain Management     AZ INJECT NERV BLCK,CELIAC PLEXUS Bilateral 8/8/2017    Procedure: BILATERAL SPLANCHNIC NERVE BLOCK T12;  Surgeon: Brittany Aldana MD;  [facility];  Service: Pain Management     AZ INJECT NERV BLCK,CELIAC PLEXUS Bilateral 4/18/2019    Procedure: BLOCK / INJECTION CELIAC PLEXUS;  Surgeon: Brittany Aldana MD;  [facility];  Service: Pain Management     AZ INJECT NERV BLCK,CELIAC PLEXUS Bilateral 8/20/2019    Procedure: SPLANCHNIC NERVE BLOCK;  Surgeon: Brittany Aldana MD;  [facility];  Service: Pain Management     AZ INJECT NERV BLCK,CELIAC PLEXUS Bilateral 10/17/2019    Procedure: SPLANCHNIC NERVE BLOCK;  Surgeon: Brittany Aldana MD;  [facility];  Service: Pain Management     AZ INJECT NERV BLCK,PARAVERT SYMPATH Bilateral 12/28/2017    Procedure: SPLANCHNIC NERVE BLOCK;  Surgeon: Brittany Aldana MD;  [facility];  Service: Pain Management  IL LAP,CHOLECYSTECTOMY N/A 2/14/2017    Procedure: LAPAROSCOPIC CHOLECYSTECTOMY, IOC, POSSIBLE OPEN ;  Surgeon: Joaquín Ng MD;  [facility];  Service: General    ROTATOR CUFF REPAIR Right     SHOULDER ARTHROSCOPY      SHOULDER ARTHROSCOPY Right        Family History   Problem Relation Age of Onset    Cirrhosis Mother     Heart disease Other         cardiac disorder    Cancer Other        Social History     Occupational History    Occupation: fulltime employment   Tobacco Use    Smoking status: Current Every Day Smoker     Packs/day: 0 50     Years: 20 00     Pack years: 10 00    Smokeless tobacco: Never Used   Substance and Sexual Activity    Alcohol use: Yes     Alcohol/week: 10 0 standard drinks     Types: 10 Cans of beer per week     Drinks per session: 3 or 4    Drug use: No    Sexual activity: Yes     Partners: Female       Current Outpatient Medications on File Prior to Visit   Medication Sig    albuterol (VENTOLIN HFA) 90 mcg/act inhaler Inhale 2 puffs every 6 (six) hours as needed for wheezing Prn    Choline Fenofibrate (FENOFIBRIC ACID) 135 MG CPDR Take 1 capsule (135 mg total) by mouth daily    EPINEPHrine (EPIPEN 2-ARIEL) 0 3 mg/0 3 mL SOAJ EpiPen 2-Ariel 0 3 MG/0 3ML Injection Solution Auto-injector  INJECT 0 3ML INTRAMUSCULARLY AS DIRECTED     Quantity: 1;  Refills: 1      Toro Briceño ; Active  2 Solution Auto-injector Pen    famotidine (PEPCID) 40 MG tablet Take 40 mg by mouth 2 (two) times a day    omega-3-acid ethyl esters (LOVAZA) 1 g capsule Take 2 capsules (2 g total) by mouth 2 (two) times a day    ondansetron (ZOFRAN-ODT) 4 mg disintegrating tablet DISSOLVE ONE TABLET IN MOUTH EVERY EIGHT HOURS AS NEEDED NAUSEA AND VOMITING for up to 90 days     pancrelipase, Lip-Prot-Amyl, (CREON) 12,000 units capsule Take 12,000 units of lipase by mouth as needed for snacks    Pancrelipase, Lip-Prot-Amyl, (CREON) 16630 units CPEP Take 1 capsule (36,000 Units total) by mouth 3 (three) times a day before meals    pantoprazole (PROTONIX) 40 mg tablet Take 1 tablet (40 mg total) by mouth 2 (two) times a day for 90 days    pregabalin (LYRICA) 100 mg capsule Take 1 capsule (100 mg total) by mouth 3 (three) times a day    rosuvastatin (CRESTOR) 40 MG tablet Take 1 tablet (40 mg total) by mouth daily    traZODone (DESYREL) 50 mg tablet TAKE ONE TABLET BY MOUTH NIGHTLY AT BEDTIME     [DISCONTINUED] ranitidine (ZANTAC) 150 MG capsule Take 150 mg by mouth 2 (two) times a day     Current Facility-Administered Medications on File Prior to Visit   Medication    oxyCODONE-acetaminophen (PERCOCET) 5-325 mg per tablet 2 tablet       Allergies   Allergen Reactions    Bee Venom Swelling       Physical Exam:    /83   Pulse 100   Resp 20   Ht 5' 11" (1 803 m)   Wt 97 7 kg (215 lb 6 4 oz)   BMI 30 04 kg/m²     Constitutional:normal, well developed, well nourished, alert, in no distress and non-toxic and no overt pain behavior    Eyes:anicteric  HEENT:grossly intact  Neck:supple, symmetric, trachea midline and no masses   Pulmonary:even and unlabored  Cardiovascular:No edema or pitting edema present  Skin:Normal without rashes or lesions and well hydrated  Psychiatric:Mood and affect appropriate  Neurologic:Cranial Nerves II-XII grossly intact  Musculoskeletal:normal    Imaging

## 2019-11-14 DIAGNOSIS — E78.2 MIXED HYPERLIPIDEMIA: ICD-10-CM

## 2019-11-14 DIAGNOSIS — E78.1 HYPERTRIGLYCERIDEMIA: ICD-10-CM

## 2019-11-14 DIAGNOSIS — J45.909 UNCOMPLICATED ASTHMA, UNSPECIFIED ASTHMA SEVERITY, UNSPECIFIED WHETHER PERSISTENT: ICD-10-CM

## 2019-11-14 RX ORDER — FENOFIBRIC ACID 135 MG/1
1 CAPSULE, DELAYED RELEASE ORAL DAILY
Qty: 90 CAPSULE | Refills: 0 | Status: SHIPPED | OUTPATIENT
Start: 2019-11-14 | End: 2020-01-06 | Stop reason: SDUPTHER

## 2019-11-14 RX ORDER — ALBUTEROL SULFATE 90 UG/1
2 AEROSOL, METERED RESPIRATORY (INHALATION) EVERY 6 HOURS PRN
Qty: 3 INHALER | Refills: 0 | Status: SHIPPED | OUTPATIENT
Start: 2019-11-14 | End: 2020-08-24 | Stop reason: SDUPTHER

## 2019-11-23 NOTE — TELEPHONE ENCOUNTER
From: Chyna Peck  Sent: 1/10/2019 1:46 PM EST  Subject: Medication Renewal Request    Chyna Peck would like a refill of the following medications:     pregabalin (LYRICA) 100 mg capsule Corina Pettit MD]    Preferred pharmacy: [facility] [id number]        Medication renewals requested in this message routed separately:     ondansetron (ZOFRAN-ODT) 4 mg disintegrating tablet LUANA Thomas]
SENT
normal...

## 2019-12-06 DIAGNOSIS — E78.2 MIXED HYPERLIPIDEMIA: ICD-10-CM

## 2019-12-10 DIAGNOSIS — E78.2 MIXED HYPERLIPIDEMIA: ICD-10-CM

## 2019-12-10 DIAGNOSIS — G47.00 INSOMNIA, UNSPECIFIED TYPE: ICD-10-CM

## 2019-12-10 RX ORDER — OMEGA-3-ACID ETHYL ESTERS 1 G/1
2 CAPSULE, LIQUID FILLED ORAL 2 TIMES DAILY
Qty: 360 CAPSULE | Refills: 0 | Status: SHIPPED | OUTPATIENT
Start: 2019-12-10 | End: 2020-07-01

## 2019-12-10 RX ORDER — ROSUVASTATIN CALCIUM 40 MG/1
40 TABLET, COATED ORAL DAILY
Qty: 90 TABLET | Refills: 0 | Status: SHIPPED | OUTPATIENT
Start: 2019-12-10 | End: 2020-01-06 | Stop reason: SDUPTHER

## 2019-12-20 NOTE — TELEPHONE ENCOUNTER
Left messages and will send a letter instructing the patient to please contact us to discuss making a follow up appointment for medication refills  Please accept or deny request so I can complete task

## 2019-12-22 RX ORDER — OMEGA-3-ACID ETHYL ESTERS 1 G/1
2 CAPSULE, LIQUID FILLED ORAL 2 TIMES DAILY
Qty: 360 CAPSULE | Refills: 0 | Status: SHIPPED | OUTPATIENT
Start: 2019-12-22 | End: 2020-01-13

## 2019-12-22 RX ORDER — TRAZODONE HYDROCHLORIDE 50 MG/1
50 TABLET ORAL
Qty: 30 TABLET | Refills: 0 | Status: SHIPPED | OUTPATIENT
Start: 2019-12-22 | End: 2020-06-23

## 2020-01-06 ENCOUNTER — OFFICE VISIT (OUTPATIENT)
Dept: FAMILY MEDICINE CLINIC | Facility: CLINIC | Age: 45
End: 2020-01-06
Payer: COMMERCIAL

## 2020-01-06 VITALS
HEIGHT: 71 IN | DIASTOLIC BLOOD PRESSURE: 80 MMHG | SYSTOLIC BLOOD PRESSURE: 130 MMHG | WEIGHT: 211 LBS | HEART RATE: 94 BPM | RESPIRATION RATE: 14 BRPM | BODY MASS INDEX: 29.54 KG/M2 | TEMPERATURE: 97.4 F | OXYGEN SATURATION: 96 %

## 2020-01-06 DIAGNOSIS — G47.00 INSOMNIA, UNSPECIFIED TYPE: ICD-10-CM

## 2020-01-06 DIAGNOSIS — K85.90 ACUTE ON CHRONIC PANCREATITIS (HCC): Primary | ICD-10-CM

## 2020-01-06 DIAGNOSIS — K21.9 GASTROESOPHAGEAL REFLUX DISEASE WITHOUT ESOPHAGITIS: ICD-10-CM

## 2020-01-06 DIAGNOSIS — E78.2 MIXED HYPERLIPIDEMIA: ICD-10-CM

## 2020-01-06 DIAGNOSIS — E78.1 HYPERTRIGLYCERIDEMIA: ICD-10-CM

## 2020-01-06 DIAGNOSIS — K86.1 ACUTE ON CHRONIC PANCREATITIS (HCC): Primary | ICD-10-CM

## 2020-01-06 PROCEDURE — 99214 OFFICE O/P EST MOD 30 MIN: CPT | Performed by: FAMILY MEDICINE

## 2020-01-06 RX ORDER — ONDANSETRON 4 MG/1
4 TABLET, ORALLY DISINTEGRATING ORAL EVERY 8 HOURS PRN
Qty: 30 TABLET | Refills: 3 | Status: SHIPPED | OUTPATIENT
Start: 2020-01-06 | End: 2020-05-28

## 2020-01-06 RX ORDER — PANTOPRAZOLE SODIUM 40 MG/1
40 TABLET, DELAYED RELEASE ORAL 2 TIMES DAILY
Qty: 180 TABLET | Refills: 3 | Status: SHIPPED | OUTPATIENT
Start: 2020-01-06 | End: 2020-09-15

## 2020-01-06 RX ORDER — ROSUVASTATIN CALCIUM 40 MG/1
40 TABLET, COATED ORAL DAILY
Qty: 90 TABLET | Refills: 0 | Status: SHIPPED | OUTPATIENT
Start: 2020-01-06 | End: 2020-06-15

## 2020-01-06 RX ORDER — FENOFIBRIC ACID 135 MG/1
1 CAPSULE, DELAYED RELEASE ORAL DAILY
Qty: 90 CAPSULE | Refills: 0 | Status: SHIPPED | OUTPATIENT
Start: 2020-01-06 | End: 2020-02-12

## 2020-01-06 NOTE — PROGRESS NOTES
BMI Counseling: Body mass index is 29 43 kg/m²  The BMI is above normal  Nutrition recommendations include decreasing portion sizes, decreasing fast food intake, limiting drinks that contain sugar and moderation in carbohydrate intake  Exercise recommendations include moderate physical activity 150 minutes/week  No pharmacotherapy was ordered  Depression Screening and Follow-up Plan: Patient's depression screening was positive with a PHQ-2 score of 6  Their PHQ-9 score was 12  Clincally patient does not have depression  No treatment is required  Patient declines further evaluation by mental health professional and/or medications  Brief counseling provided  Will re-evaluate at next office visit  Tobacco Cessation Counseling: Tobacco cessation counseling was provided  The patient is sincerely urged to quit consumption of tobacco  He is not ready to quit tobacco      Assessment/Plan:     Chronic Problems:  No problem-specific Assessment & Plan notes found for this encounter  Visit Diagnosis:  Diagnoses and all orders for this visit:    Acute on chronic pancreatitis ([address] )    Insomnia, unspecified type  -     ondansetron (ZOFRAN-ODT) 4 mg disintegrating tablet; Take 1 tablet (4 mg total) by mouth every 8 (eight) hours as needed for nausea or vomiting    Gastroesophageal reflux disease without esophagitis  -     pantoprazole (PROTONIX) 40 mg tablet; Take 1 tablet (40 mg total) by mouth 2 (two) times a day    Mixed hyperlipidemia  -     rosuvastatin (CRESTOR) 40 MG tablet; Take 1 tablet (40 mg total) by mouth daily  -     Choline Fenofibrate (FENOFIBRIC ACID) 135 MG CPDR; Take 1 capsule (135 mg total) by mouth daily    Hypertriglyceridemia  -     Choline Fenofibrate (FENOFIBRIC ACID) 135 MG CPDR;  Take 1 capsule (135 mg total) by mouth daily    Chronic pancreatitis /hypertriglyceridemia   stable, to continue current medications per direction GI specialty, maintain scheduled follow-up with GI   additionally treated by endocrinology to maintain current therapies and treatments   asthma   stable, reviewed step plan care no recent flares   GERD/esophagitis   stable, to continue current medications        Subjective:    Patient ID: Susan Rodriguez is a 53 y o  male  Doing well   Chronic pancreatitis , Dinora casper  trgilycerides , up and down , still undercare of both the gi spec and endocrine , has been receiving insulin therapy in the thoughts that it will control the triglyceride and the action of the pancreas  Isamar Ape last night   They really want me to work even more on the diet     Pain management Beonit Espinoza not much at all over the holiday 5 beers that is all , not on a daily or weekly basis    Sister recently dx pancreatic cancer 62 yr , [facility]     Negative chest pain palpitations shortness of breath difficulty breathing cough lesions rash   denies any change in bowel or bladder, negative nausea vomiting diarrhea         The following portions of the patient's history were reviewed and updated as appropriate: allergies, current medications, past family history, past medical history, past social history, past surgical history and problem list     Review of Systems   Constitutional: Negative for appetite change, chills, fever and unexpected weight change  HENT: Negative for congestion, dental problem, ear pain, hearing loss, postnasal drip, rhinorrhea, sinus pressure, sinus pain, sneezing, sore throat, tinnitus and voice change  Eyes: Negative for visual disturbance  Respiratory: Negative for apnea, cough, chest tightness and shortness of breath  Cardiovascular: Negative for chest pain, palpitations and leg swelling  Gastrointestinal: Negative for abdominal pain, blood in stool, constipation, diarrhea, nausea and vomiting  Endocrine: Negative for cold intolerance, heat intolerance, polydipsia, polyphagia and polyuria     Genitourinary: Negative for decreased urine volume, difficulty urinating, dysuria, frequency and hematuria  Musculoskeletal: Negative for arthralgias, back pain, gait problem, joint swelling and myalgias  Skin: Negative for color change, rash and wound  Allergic/Immunologic: Negative for environmental allergies and food allergies  Neurological: Negative for dizziness, syncope, weakness, light-headedness, numbness and headaches  Hematological: Negative for adenopathy  Does not bruise/bleed easily  Psychiatric/Behavioral: Negative for sleep disturbance and suicidal ideas  The patient is not nervous/anxious  /80 (BP Location: Left arm, Patient Position: Sitting, Cuff Size: Adult)   Pulse 94   Temp (!) 97 4 °F (36 3 °C)   Resp 14   Ht 5' 11" (1 803 m)   Wt 95 7 kg (211 lb)   SpO2 96%   BMI 29 43 kg/m²   Social History     Socioeconomic History    Marital status: /Civil Union     Spouse name: Not on file    Number of children: Not on file    Years of education: Not on file    Highest education level: Not on file   Occupational History    Occupation: fulltime employment   Social Needs    Financial resource strain: Not on file    Food insecurity:     Worry: Not on file     Inability: Not on file    Transportation needs:     Medical: Not on file     Non-medical: Not on file   Tobacco Use    Smoking status: Current Every Day Smoker     Packs/day: 0 50     Years: 20 00     Pack years: 10 00    Smokeless tobacco: Never Used   Substance and Sexual Activity    Alcohol use:  Yes     Alcohol/week: 10 0 standard drinks     Types: 10 Cans of beer per week     Drinks per session: 3 or 4    Drug use: No    Sexual activity: Yes     Partners: Female   Lifestyle    Physical activity:     Days per week: Not on file     Minutes per session: Not on file    Stress: Not on file   Relationships    Social connections:     Talks on phone: Not on file     Gets together: Not on file     Attends [employer] service: Not on file     Active member of club or organization: Not on file     Attends meetings of clubs or organizations: Not on file     Relationship status: Not on file    Intimate partner violence:     Fear of current or ex partner: Not on file     Emotionally abused: Not on file     Physically abused: Not on file     Forced sexual activity: Not on file   Other Topics Concern    Not on file   Social History Narrative    Lives with family    Daily caffeine consumption     Past Medical History:   Diagnosis Date    Asthma     Chronic pain disorder     GERD (gastroesophageal reflux disease)     Hyperlipidemia     Liver abscess     Meniscus tear     left knee work injury last assessed 08/24/2016    Pancreatitis     Pneumonia      Family History   Problem Relation Age of Onset    Cirrhosis Mother     Heart disease Other         cardiac disorder    Cancer Other      Past Surgical History:   Procedure Laterality Date    CELIAC PLEXUS BLOCK Bilateral 10/29/2018    Procedure: SPLANCHNIC NERVE BLOCK;  Surgeon: Shabbir José MD;  [facility];  Service: Pain Management     CELIAC PLEXUS BLOCK Bilateral 1/10/2019    Procedure: SPLANCHNIC NERVE BLOCK;  Surgeon: Shabbir José MD;  [facility];  Service: Pain Management     CHOLECYSTECTOMY      ESOPHAGOGASTRODUODENOSCOPY N/A 11/16/2017    Procedure: ESOPHAGOGASTRODUODENOSCOPY (EGD); Surgeon: Nadia Allen MD;  [facility]; Service: Gastroenterology    ESOPHAGOGASTRODUODENOSCOPY N/A 4/19/2018    Procedure: ESOPHAGOGASTRODUODENOSCOPY (EGD); Surgeon: Loren Roger MD;  [facility]; Service: Gastroenterology    ESOPHAGOGASTRODUODENOSCOPY N/A 5/10/2018    Procedure: ESOPHAGOGASTRODUODENOSCOPY (EGD); Surgeon: Ondina Schneider MD;  [facility];   Service: Gastroenterology    [facility] CATH  2/28/2019    KNEE ARTHROSCOPY Bilateral     KNEE ARTHROSCOPY Right 2009    cibishino last assessed 08/24/2016    KNEE ARTHROSCOPY Right 07/01/2019    LIVER SURGERY      NERVE BLOCK Bilateral 5/29/2018    Procedure: SPLANCHNIC NERVE BLOCK;  Surgeon: Brittany Aldana MD;  [facility];  Service: Pain Management     PANCREAS SURGERY      stents    PANCREATIC CYST EXCISION      [place] INJECT NERV [address] Bilateral 5/9/2017    Procedure: CELIAC PLEXUS BLOCK ;  Surgeon: Brittany Aldana MD;  [facility];  Service: Pain Management     [place] INJECT NERV BLCK,CELIAC PLEXUS Bilateral 6/1/2017    Procedure: SPLANCHNIC NERVE BLOCK at T12;  Surgeon: Brittany Aldana MD;  [facility];  Service: Pain Management     [place] INJECT NERV BLCK,CELIAC PLEXUS Bilateral 8/8/2017    Procedure: BILATERAL SPLANCHNIC NERVE BLOCK T12;  Surgeon: Brittany Aldana MD;  [facility];  Service: Pain Management     [place] INJECT NERV BLCK,CELIAC PLEXUS Bilateral 4/18/2019    Procedure: BLOCK / INJECTION CELIAC PLEXUS;  Surgeon: Brittany Aldana MD;  [facility];  Service: Pain Management     [place] INJECT NERV BLCK,CELIAC PLEXUS Bilateral 8/20/2019    Procedure: SPLANCHNIC NERVE BLOCK;  Surgeon: Brittany Aldana MD;  [facility];  Service: Pain Management     [place] INJECT NERV [address] Bilateral 10/17/2019    Procedure: SPLANCHNIC NERVE BLOCK;  Surgeon: Brittany Aldana MD;  [facility];  Service: Pain Management     [place] INJECT NERV Cayla Shack Bilateral 12/28/2017    Procedure: SPLANCHNIC NERVE BLOCK;  Surgeon: Brittany Aldana MD;  [facility];  Service: Pain Management     [place] LAP,CHOLECYSTECTOMY N/A 2/14/2017    Procedure: LAPAROSCOPIC CHOLECYSTECTOMY, IOC, POSSIBLE OPEN ;  Surgeon: Thomas Russo MD;  [facility];  Service: General    ROTATOR CUFF REPAIR Right     SHOULDER ARTHROSCOPY      SHOULDER ARTHROSCOPY Right        Current Outpatient Medications:     albuterol (VENTOLIN HFA) 90 mcg/act inhaler, Inhale 2 puffs every 6 (six) hours as needed for wheezing Prn, Disp: 3 Inhaler, Rfl: 0    Choline Fenofibrate (FENOFIBRIC ACID) 135 MG CPDR, Take 1 capsule (135 mg total) by mouth daily, Disp: 90 capsule, Rfl: 0    EPINEPHrine (EPIPEN 2-ARIEL) 0 3 mg/0 3 mL SOAJ, EpiPen 2-Ariel 0 3 MG/0 3ML Injection Solution Auto-injector INJECT 0 3ML INTRAMUSCULARLY AS DIRECTED  Quantity: 1;  Refills: 1   Toro Briceño ; Active 2 Solution Auto-injector Pen, Disp: , Rfl:     famotidine (PEPCID) 40 MG tablet, Take 40 mg by mouth 2 (two) times a day, Disp: , Rfl:     omega-3-acid ethyl esters (LOVAZA) 1 g capsule, Take 2 capsules (2 g total) by mouth 2 (two) times a day, Disp: 360 capsule, Rfl: 0    omega-3-acid ethyl esters (LOVAZA) 1 g capsule, Take 2 capsules (2 g total) by mouth 2 (two) times a day, Disp: 360 capsule, Rfl: 0    ondansetron (ZOFRAN-ODT) 4 mg disintegrating tablet, Take 1 tablet (4 mg total) by mouth every 8 (eight) hours as needed for nausea or vomiting, Disp: 30 tablet, Rfl: 3    pancrelipase, Lip-Prot-Amyl, (CREON) 12,000 units capsule, Take 12,000 units of lipase by mouth as needed for snacks, Disp: 90 capsule, Rfl: 2    Pancrelipase, Lip-Prot-Amyl, (CREON) 95889 units CPEP, Take 1 capsule (36,000 Units total) by mouth 3 (three) times a day before meals, Disp: 268 capsule, Rfl: 2    pantoprazole (PROTONIX) 40 mg tablet, Take 1 tablet (40 mg total) by mouth 2 (two) times a day, Disp: 180 tablet, Rfl: 3    pregabalin (LYRICA) 100 mg capsule, Take 1 capsule (100 mg total) by mouth 3 (three) times a day, Disp: 90 capsule, Rfl: 2    rosuvastatin (CRESTOR) 40 MG tablet, Take 1 tablet (40 mg total) by mouth daily, Disp: 90 tablet, Rfl: 0    traZODone (DESYREL) 50 mg tablet, Take 1 tablet (50 mg total) by mouth daily at bedtime, Disp: 30 tablet, Rfl: 0  No current facility-administered medications for this visit       Allergies   Allergen Reactions    Bee Venom Swelling          Lab Review   Admission on 09/06/2019, Discharged on 09/09/2019   Component Date Value    WBC 09/06/2019 4 55     RBC 09/06/2019 3 81*  Hemoglobin 09/06/2019 13 1     Hematocrit 09/06/2019 39 2     MCV 09/06/2019 103*    MCH 09/06/2019 34 4*    MCHC 09/06/2019 33 4     RDW 09/06/2019 15 7*    MPV 09/06/2019 11 0     Platelets 35/87/3495 106*    nRBC 09/06/2019 0     Neutrophils Relative 09/06/2019 57     Immat GRANS % 09/06/2019 1     Lymphocytes Relative 09/06/2019 33     Monocytes Relative 09/06/2019 8     Eosinophils Relative 09/06/2019 1     Basophils Relative 09/06/2019 0     Neutrophils Absolute 09/06/2019 2 58     Immature Grans Absolute 09/06/2019 0 04     Lymphocytes Absolute 09/06/2019 1 51     Monocytes Absolute 09/06/2019 0 35     Eosinophils Absolute 09/06/2019 0 05     Basophils Absolute 09/06/2019 0 02     Sodium 09/06/2019 139     Potassium 09/06/2019 3 9     Chloride 09/06/2019 102     CO2 09/06/2019 26     ANION GAP 09/06/2019 11     BUN 09/06/2019 10     Creatinine 09/06/2019 0 80     Glucose 09/06/2019 135     Calcium 09/06/2019 8 7     AST 09/06/2019 143*    ALT 09/06/2019 184*    Alkaline Phosphatase 09/06/2019 265*    Total Protein 09/06/2019 6 8     Albumin 09/06/2019 3 2*    Total Bilirubin 09/06/2019 0 80     eGFR 09/06/2019 107     Lipase 09/06/2019 281     LACTIC ACID 09/06/2019 0 8     Color, UA 09/06/2019 Yellow     Clarity, UA 09/06/2019 Clear     Specific Gravity, UA 09/06/2019 1 015     pH, UA 09/06/2019 6 0     Leukocytes, UA 09/06/2019 Negative     Nitrite, UA 09/06/2019 Negative     Protein, UA 09/06/2019 Negative     Glucose, UA 09/06/2019 Negative     Ketones, UA 09/06/2019 Negative     Urobilinogen, UA 09/06/2019 0 2     Bilirubin, UA 09/06/2019 Negative     Blood, UA 09/06/2019 Negative     Triglycerides 09/06/2019 1,436*    Sodium 09/07/2019 137     Potassium 09/07/2019 3 2*    Chloride 09/07/2019 101     CO2 09/07/2019 28     ANION GAP 09/07/2019 8     BUN 09/07/2019 7     Creatinine 09/07/2019 0 70     Glucose 09/07/2019 112     Calcium 09/07/2019 8 4     AST 09/07/2019 83*    ALT 09/07/2019 123*    Alkaline Phosphatase 09/07/2019 147*    Total Protein 09/07/2019 5 8*    Albumin 09/07/2019 2 8*    Total Bilirubin 09/07/2019 0 80     eGFR 09/07/2019 113     Magnesium 09/07/2019 1 2*    WBC 09/07/2019 3 97*    RBC 09/07/2019 3 44*    Hemoglobin 09/07/2019 11 6*    Hematocrit 09/07/2019 35 2*    MCV 09/07/2019 102*    MCH 09/07/2019 33 7     MCHC 09/07/2019 33 0     RDW 09/07/2019 15 4*    Platelets 73/00/0239 81*    MPV 09/07/2019 11 0     Triglycerides 09/07/2019 793*    POC Glucose 09/07/2019 106     POC Glucose 09/07/2019 209*    POC Glucose 09/07/2019 157*    POC Glucose 09/07/2019 100     Sodium 09/08/2019 136     Potassium 09/08/2019 3 3*    Chloride 09/08/2019 99*    CO2 09/08/2019 28     ANION GAP 09/08/2019 9     BUN 09/08/2019 7     Creatinine 09/08/2019 0 79     Glucose 09/08/2019 148*    Calcium 09/08/2019 8 0*    AST 09/08/2019 84*    ALT 09/08/2019 115*    Alkaline Phosphatase 09/08/2019 136*    Total Protein 09/08/2019 6 0*    Albumin 09/08/2019 3 0*    Total Bilirubin 09/08/2019 1 10*    eGFR 09/08/2019 108     POC Glucose 09/07/2019 126     POC Glucose 09/08/2019 141*    POC Glucose 09/08/2019 151*    Vitamin B-12 09/08/2019 770     Folate 09/08/2019 >20 0*    Lipase 09/08/2019 62*    Amylase 09/08/2019 26     POC Glucose 09/08/2019 119     POC Glucose 09/08/2019 134     Triglycerides 09/08/2019 643*    POC Glucose 09/08/2019 162*    POC Glucose 09/08/2019 95     POC Glucose 09/08/2019 106     POC Glucose 09/08/2019 171*    POC Glucose 09/08/2019 136     POC Glucose 09/08/2019 112     POC Glucose 09/08/2019 138     POC Glucose 09/08/2019 145*    POC Glucose 09/09/2019 162*    POC Glucose 09/09/2019 124     WBC 09/09/2019 2 66*    RBC 09/09/2019 3 42*    Hemoglobin 09/09/2019 11 5*    Hematocrit 09/09/2019 35 3*    MCV 09/09/2019 103*    MCH 09/09/2019 33 6     MCHC 09/09/2019 32 6     RDW 09/09/2019 14 0     MPV 09/09/2019 11 7     Platelets 42/54/6884 77*    nRBC 09/09/2019 0     Neutrophils Relative 09/09/2019 38*    Immat GRANS % 09/09/2019 0     Lymphocytes Relative 09/09/2019 47*    Monocytes Relative 09/09/2019 13*    Eosinophils Relative 09/09/2019 2     Basophils Relative 09/09/2019 0     Neutrophils Absolute 09/09/2019 1 00*    Immature Grans Absolute 09/09/2019 0 01     Lymphocytes Absolute 09/09/2019 1 24     Monocytes Absolute 09/09/2019 0 34     Eosinophils Absolute 09/09/2019 0 06     Basophils Absolute 09/09/2019 0 01     Sodium 09/09/2019 138     Potassium 09/09/2019 3 8     Chloride 09/09/2019 102     CO2 09/09/2019 29     ANION GAP 09/09/2019 7     BUN 09/09/2019 7     Creatinine 09/09/2019 0 78     Glucose 09/09/2019 152*    Calcium 09/09/2019 8 8     AST 09/09/2019 37     ALT 09/09/2019 86*    Alkaline Phosphatase 09/09/2019 144*    Total Protein 09/09/2019 6 4     Albumin 09/09/2019 3 2*    Total Bilirubin 09/09/2019 0 70     eGFR 09/09/2019 108     Triglycerides 09/09/2019 523*    POC Glucose 09/09/2019 146*    POC Glucose 09/09/2019 123     POC Glucose 09/09/2019 135    Orders Only on 09/04/2019   Component Date Value    Triglycerides 09/04/2019 1,353*    Glucose, Random 09/04/2019 113*    BUN 09/04/2019 5*    Creatinine 09/04/2019 0 71     eGFR Non  09/04/2019 113     eGFR  09/04/2019 130     SL AMB BUN/CREATININE RA* 09/04/2019 7     Sodium 09/04/2019 138     Potassium 09/04/2019 3 7     Chloride 09/04/2019 105     CO2 09/04/2019 28     SL AMB CALCIUM 09/04/2019 9 0     Protein, Total 09/04/2019 6 0*    Albumin 09/04/2019 3 6     Globulin 09/04/2019 2 4     Albumin/Globulin Ratio 09/04/2019 1 5     TOTAL BILIRUBIN 09/04/2019 1 1     Alkaline Phosphatase 09/04/2019 266*    AST 09/04/2019 241*    ALT 09/04/2019 224*   Admission on 08/20/2019, Discharged on 08/20/2019 Component Date Value    WBC 08/20/2019 2 71*    RBC 08/20/2019 4 17     Hemoglobin 08/20/2019 14 2     Hematocrit 08/20/2019 41 3     MCV 08/20/2019 99*    MCH 08/20/2019 34 1     MCHC 08/20/2019 34 4     RDW 08/20/2019 15 4*    MPV 08/20/2019 10 4     Platelets 39/46/9945 112*    nRBC 08/20/2019 0     NT-proBNP 08/20/2019 11     Sodium 08/20/2019 134*    Potassium 08/20/2019 4 1     Chloride 08/20/2019 98*    CO2 08/20/2019 22     ANION GAP 08/20/2019 14*    BUN 08/20/2019 16     Creatinine 08/20/2019 0 80     Glucose 08/20/2019 213*    Calcium 08/20/2019 8 0*    AST 08/20/2019 205*    ALT 08/20/2019 174*    Alkaline Phosphatase 08/20/2019 185*    Total Protein 08/20/2019 6 2*    Albumin 08/20/2019 3 1*    Total Bilirubin 08/20/2019 1 50*    eGFR 08/20/2019 107     LACTIC ACID 08/20/2019 1 4     Lipase 08/20/2019 81     Magnesium 08/20/2019 1 6     pH, Shola 08/20/2019 7 379     pCO2, Shola 08/20/2019 41 3*    pO2, Shola 08/20/2019 87 9*    HCO3, Shola 08/20/2019 23 8*    Base Excess, Shola 08/20/2019 -1 3     O2 Content, Shola 08/20/2019 18 8     O2 HGB, VENOUS 08/20/2019 91 9*    Protime 08/20/2019 15 8*    INR 08/20/2019 1 25*    PTT 08/20/2019 29     Procalcitonin 08/20/2019 0 89*    Troponin I 08/20/2019 <0 02     Blood Culture 08/20/2019 No Growth After 5 Days   Blood Culture 08/20/2019 No Growth After 5 Days       Segmented % 08/20/2019 71     Lymphocytes % 08/20/2019 24     Monocytes % 08/20/2019 2*    Eosinophils, % 08/20/2019 0     Basophils % 08/20/2019 0     Atypical Lymphocytes % 08/20/2019 3*    Absolute Neutrophils 08/20/2019 1 92     Lymphocytes Absolute 08/20/2019 0 65     Monocytes Absolute 08/20/2019 0 05     Eosinophils Absolute 08/20/2019 0 00     Basophils Absolute 08/20/2019 0 00     Total Counted 08/20/2019 100     Platelet Estimate 74/29/3565 Borderline*    Troponin I 08/20/2019 <0 02     Ventricular Rate 08/20/2019 78     Atrial Rate 08/20/2019 78     TX Interval 08/20/2019 144     QRSD Interval 08/20/2019 114     QT Interval 08/20/2019 402     QTC Interval 08/20/2019 458     P Axis 08/20/2019 63     QRS Axis 08/20/2019 59     T Wave Axis 08/20/2019 56    Orders Only on 08/07/2019   Component Date Value    Glucose, Random 08/07/2019 121*    BUN 08/07/2019 6*    Creatinine 08/07/2019 0 79     eGFR Non  08/07/2019 108     eGFR  08/07/2019 125     SL AMB BUN/CREATININE RA* 08/07/2019 8     Sodium 08/07/2019 137     Potassium 08/07/2019 3 8     Chloride 08/07/2019 105     CO2 08/07/2019 24     SL AMB CALCIUM 08/07/2019 9 0     Protein, Total 08/07/2019 6 2     Albumin 08/07/2019 3 8     Globulin 08/07/2019 2 4     Albumin/Globulin Ratio 08/07/2019 1 6     TOTAL BILIRUBIN 08/07/2019 1 5*    Alkaline Phosphatase 08/07/2019 146*    AST 08/07/2019 146*    ALT 08/07/2019 161*        Imaging: No results found  Objective:     Physical Exam   Constitutional: He is oriented to person, place, and time  He appears well-developed and well-nourished  No distress  HENT:   Head: Normocephalic and atraumatic  Eyes: EOM are normal    Neck: Normal range of motion  Neck supple  Cardiovascular: Normal rate, regular rhythm and normal heart sounds  Pulmonary/Chest: Effort normal and breath sounds normal    Abdominal: Soft  Bowel sounds are normal    Musculoskeletal: Normal range of motion  Neurological: He is alert and oriented to person, place, and time  He has normal reflexes  Skin: Skin is warm and dry  Psychiatric: He has a normal mood and affect  His behavior is normal  Judgment and thought content normal          There are no Patient Instructions on file for this visit  Michael Standard, CRNP    Portions of the record may have been created with voice recognition software    Occasional wrong word or "sound a like" substitutions may have occurred due to the inherent limitations of voice recognition software  Read the chart carefully and recognize, using context, where substitutions have occurred

## 2020-01-13 RX ORDER — HYDROCODONE BITARTRATE AND ACETAMINOPHEN 7.5; 325 MG/1; MG/1
TABLET ORAL EVERY 8 HOURS
COMMUNITY
End: 2020-02-24

## 2020-01-15 ENCOUNTER — APPOINTMENT (EMERGENCY)
Dept: CT IMAGING | Facility: HOSPITAL | Age: 45
End: 2020-01-15
Payer: COMMERCIAL

## 2020-01-15 ENCOUNTER — OFFICE VISIT (OUTPATIENT)
Dept: GASTROENTEROLOGY | Facility: CLINIC | Age: 45
End: 2020-01-15
Payer: COMMERCIAL

## 2020-01-15 ENCOUNTER — HOSPITAL ENCOUNTER (EMERGENCY)
Facility: HOSPITAL | Age: 45
Discharge: HOME | End: 2020-01-15
Attending: EMERGENCY MEDICINE | Admitting: EMERGENCY MEDICINE
Payer: COMMERCIAL

## 2020-01-15 VITALS
SYSTOLIC BLOOD PRESSURE: 162 MMHG | WEIGHT: 211.42 LBS | HEIGHT: 71 IN | HEART RATE: 79 BPM | BODY MASS INDEX: 29.6 KG/M2 | DIASTOLIC BLOOD PRESSURE: 88 MMHG | RESPIRATION RATE: 18 BRPM | OXYGEN SATURATION: 95 % | TEMPERATURE: 98.6 F

## 2020-01-15 VITALS
HEIGHT: 71 IN | DIASTOLIC BLOOD PRESSURE: 92 MMHG | SYSTOLIC BLOOD PRESSURE: 168 MMHG | BODY MASS INDEX: 29.43 KG/M2 | WEIGHT: 210.2 LBS | HEART RATE: 91 BPM

## 2020-01-15 DIAGNOSIS — K29.70 GASTRITIS: Primary | ICD-10-CM

## 2020-01-15 DIAGNOSIS — E78.1 HYPERTRIGLYCERIDEMIA: Primary | ICD-10-CM

## 2020-01-15 DIAGNOSIS — K85.90 PANCREATITIS, UNSPECIFIED PANCREATITIS TYPE: ICD-10-CM

## 2020-01-15 LAB
ALBUMIN SERPL BCP-MCNC: 3.7 G/DL (ref 3.5–5)
ALP SERPL-CCNC: 167 U/L (ref 46–116)
ALT SERPL W P-5'-P-CCNC: 238 U/L (ref 12–78)
ANION GAP SERPL CALCULATED.3IONS-SCNC: 17 MMOL/L (ref 4–13)
AST SERPL W P-5'-P-CCNC: 403 U/L (ref 5–45)
BASOPHILS # BLD AUTO: 0.03 THOUSANDS/ΜL (ref 0–0.1)
BASOPHILS NFR BLD AUTO: 1 % (ref 0–1)
BILIRUB SERPL-MCNC: 1.1 MG/DL (ref 0.2–1)
BUN SERPL-MCNC: 11 MG/DL (ref 5–25)
CALCIUM SERPL-MCNC: 8.6 MG/DL (ref 8.3–10.1)
CHLORIDE SERPL-SCNC: 102 MMOL/L (ref 100–108)
CO2 SERPL-SCNC: 18 MMOL/L (ref 21–32)
CREAT SERPL-MCNC: 0.95 MG/DL (ref 0.6–1.3)
EOSINOPHIL # BLD AUTO: 0.06 THOUSAND/ΜL (ref 0–0.61)
EOSINOPHIL NFR BLD AUTO: 1 % (ref 0–6)
ERYTHROCYTE [DISTWIDTH] IN BLOOD BY AUTOMATED COUNT: 12.4 % (ref 11.6–15.1)
GFR SERPL CREATININE-BSD FRML MDRD: 96 ML/MIN/1.73SQ M
GLUCOSE SERPL-MCNC: 194 MG/DL (ref 65–140)
HCT VFR BLD AUTO: 45.9 % (ref 36.5–49.3)
HGB BLD-MCNC: 16.1 G/DL (ref 12–17)
HOLD SPECIMEN: NORMAL
IMM GRANULOCYTES # BLD AUTO: 0.01 THOUSAND/UL (ref 0–0.2)
IMM GRANULOCYTES NFR BLD AUTO: 0 % (ref 0–2)
LIPASE SERPL-CCNC: 156 U/L (ref 73–393)
LYMPHOCYTES # BLD AUTO: 1.48 THOUSANDS/ΜL (ref 0.6–4.47)
LYMPHOCYTES NFR BLD AUTO: 28 % (ref 14–44)
MCH RBC QN AUTO: 35.2 PG (ref 26.8–34.3)
MCHC RBC AUTO-ENTMCNC: 35.1 G/DL (ref 31.4–37.4)
MCV RBC AUTO: 100 FL (ref 82–98)
MONOCYTES # BLD AUTO: 0.35 THOUSAND/ΜL (ref 0.17–1.22)
MONOCYTES NFR BLD AUTO: 7 % (ref 4–12)
NEUTROPHILS # BLD AUTO: 3.44 THOUSANDS/ΜL (ref 1.85–7.62)
NEUTS SEG NFR BLD AUTO: 63 % (ref 43–75)
NRBC BLD AUTO-RTO: 0 /100 WBCS
PLATELET # BLD AUTO: 125 THOUSANDS/UL (ref 149–390)
PMV BLD AUTO: 11.6 FL (ref 8.9–12.7)
POTASSIUM SERPL-SCNC: 4.1 MMOL/L (ref 3.5–5.3)
PROT SERPL-MCNC: 7.6 G/DL (ref 6.4–8.2)
RBC # BLD AUTO: 4.57 MILLION/UL (ref 3.88–5.62)
SODIUM SERPL-SCNC: 137 MMOL/L (ref 136–145)
WBC # BLD AUTO: 5.37 THOUSAND/UL (ref 4.31–10.16)

## 2020-01-15 PROCEDURE — 80053 COMPREHEN METABOLIC PANEL: CPT | Performed by: EMERGENCY MEDICINE

## 2020-01-15 PROCEDURE — 99284 EMERGENCY DEPT VISIT MOD MDM: CPT | Performed by: EMERGENCY MEDICINE

## 2020-01-15 PROCEDURE — 96374 THER/PROPH/DIAG INJ IV PUSH: CPT

## 2020-01-15 PROCEDURE — C9113 INJ PANTOPRAZOLE SODIUM, VIA: HCPCS | Performed by: EMERGENCY MEDICINE

## 2020-01-15 PROCEDURE — 96361 HYDRATE IV INFUSION ADD-ON: CPT

## 2020-01-15 PROCEDURE — 74177 CT ABD & PELVIS W/CONTRAST: CPT

## 2020-01-15 PROCEDURE — 36415 COLL VENOUS BLD VENIPUNCTURE: CPT

## 2020-01-15 PROCEDURE — 96375 TX/PRO/DX INJ NEW DRUG ADDON: CPT

## 2020-01-15 PROCEDURE — 85025 COMPLETE CBC W/AUTO DIFF WBC: CPT | Performed by: EMERGENCY MEDICINE

## 2020-01-15 PROCEDURE — 83690 ASSAY OF LIPASE: CPT | Performed by: EMERGENCY MEDICINE

## 2020-01-15 PROCEDURE — 99213 OFFICE O/P EST LOW 20 MIN: CPT | Performed by: PHYSICIAN ASSISTANT

## 2020-01-15 PROCEDURE — 99284 EMERGENCY DEPT VISIT MOD MDM: CPT

## 2020-01-15 RX ORDER — SUCRALFATE ORAL 1 G/10ML
1000 SUSPENSION ORAL ONCE
Status: COMPLETED | OUTPATIENT
Start: 2020-01-15 | End: 2020-01-15

## 2020-01-15 RX ORDER — SUCRALFATE ORAL 1 G/10ML
1 SUSPENSION ORAL
Qty: 420 ML | Refills: 0 | Status: SHIPPED | OUTPATIENT
Start: 2020-01-15 | End: 2020-02-24

## 2020-01-15 RX ORDER — HYDROMORPHONE HCL/PF 1 MG/ML
1 SYRINGE (ML) INJECTION ONCE
Status: COMPLETED | OUTPATIENT
Start: 2020-01-15 | End: 2020-01-15

## 2020-01-15 RX ORDER — PANTOPRAZOLE SODIUM 40 MG/1
40 INJECTION, POWDER, FOR SOLUTION INTRAVENOUS ONCE
Status: COMPLETED | OUTPATIENT
Start: 2020-01-15 | End: 2020-01-15

## 2020-01-15 RX ADMIN — HYDROMORPHONE HYDROCHLORIDE 1 MG: 1 INJECTION, SOLUTION INTRAMUSCULAR; INTRAVENOUS; SUBCUTANEOUS at 16:46

## 2020-01-15 RX ADMIN — SUCRALFATE 1000 MG: 1 SUSPENSION ORAL at 18:12

## 2020-01-15 RX ADMIN — SODIUM CHLORIDE 1000 ML: 0.9 INJECTION, SOLUTION INTRAVENOUS at 16:46

## 2020-01-15 RX ADMIN — IOHEXOL 100 ML: 350 INJECTION, SOLUTION INTRAVENOUS at 17:14

## 2020-01-15 RX ADMIN — PANTOPRAZOLE SODIUM 40 MG: 40 INJECTION, POWDER, FOR SOLUTION INTRAVENOUS at 18:12

## 2020-01-15 NOTE — ED PROVIDER NOTES
History  Chief Complaint   Patient presents with    Abdominal Pain     pt reports abdominal pain since yesterday, hx of pancreatitis     52 yo male who was sent by PCP for eval due to recurrent epigastric pain c/w his pancreatitis due to high trigycerides (PCP and endocrine trying to control)  Pain worsened after work yesterday (about 24 hours)  Denies nausea currently  History provided by:  Patient   used: No    Abdominal Pain   Pain location:  Epigastric  Pain quality: aching    Pain radiates to:  Does not radiate  Pain severity:  Moderate  Onset quality:  Gradual  Duration:  1 day  Timing:  Constant  Progression:  Unchanged  Chronicity:  Recurrent  Relieved by:  Nothing  Worsened by:  Palpation and movement  Ineffective treatments:  None tried  Associated symptoms: anorexia    Associated symptoms: no chest pain, no chills, no constipation, no diarrhea, no dysuria, no fatigue, no fever, no hematuria, no nausea, no shortness of breath, no sore throat and no vomiting        Prior to Admission Medications   Prescriptions Last Dose Informant Patient Reported? Taking? Choline Fenofibrate (FENOFIBRIC ACID) 135 MG CPDR  Self No No   Sig: Take 1 capsule (135 mg total) by mouth daily   EPINEPHrine (EPIPEN 2-ARIEL) 0 3 mg/0 3 mL SOAJ  Self Yes No   Sig: EpiPen 2-Ariel 0 3 MG/0 3ML Injection Solution Auto-injector  INJECT 0 3ML INTRAMUSCULARLY AS DIRECTED     Quantity: 1;  Refills: 1      oTro Briceño ; Active  2 Solution Auto-injector Pen   Evolocumab 140 MG/ML SOAJ  Self Yes No   Sig: Inject 140 mg under the skin every 14 (fourteen) days   HYDROcodone-acetaminophen (NORCO) 7 5-325 mg per tablet  Self Yes No   Sig: every 8 (eight) hours   Pancrelipase, Lip-Prot-Amyl, (CREON) 85913 units CPEP  Self No No   Sig: Take 1 capsule (36,000 Units total) by mouth 3 (three) times a day before meals   albuterol (VENTOLIN HFA) 90 mcg/act inhaler  Self No No   Sig: Inhale 2 puffs every 6 (six) hours as needed for wheezing Prn   famotidine (PEPCID) 40 MG tablet  Self Yes No   Sig: Take 40 mg by mouth 2 (two) times a day   insulin degludec (TRESIBA) 100 units/mL injection pen  Self Yes No   Sig: 15 units subcut in hs   omega-3-acid ethyl esters (LOVAZA) 1 g capsule  Self No No   Sig: Take 2 capsules (2 g total) by mouth 2 (two) times a day   ondansetron (ZOFRAN-ODT) 4 mg disintegrating tablet  Self No No   Sig: Take 1 tablet (4 mg total) by mouth every 8 (eight) hours as needed for nausea or vomiting   pancrelipase, Lip-Prot-Amyl, (CREON) 12,000 units capsule  Self No No   Sig: Take 12,000 units of lipase by mouth as needed for snacks   pantoprazole (PROTONIX) 40 mg tablet  Self No No   Sig: Take 1 tablet (40 mg total) by mouth 2 (two) times a day   pregabalin (LYRICA) 100 mg capsule  Self No No   Sig: Take 1 capsule (100 mg total) by mouth 3 (three) times a day   rosuvastatin (CRESTOR) 40 MG tablet  Self No No   Sig: Take 1 tablet (40 mg total) by mouth daily   traZODone (DESYREL) 50 mg tablet  Self No No   Sig: Take 1 tablet (50 mg total) by mouth daily at bedtime      Facility-Administered Medications: None       Past Medical History:   Diagnosis Date    Asthma     Chronic pain disorder     GERD (gastroesophageal reflux disease)     Hyperlipidemia     Liver abscess     Meniscus tear     left knee work injury last assessed 08/24/2016    Pancreatitis     Pneumonia        Past Surgical History:   Procedure Laterality Date    CELIAC PLEXUS BLOCK Bilateral 10/29/2018    Procedure: SPLANCHNIC NERVE BLOCK;  Surgeon: Reymundo Tran MD;  [facility];  Service: Pain Management     CELIAC PLEXUS BLOCK Bilateral 1/10/2019    Procedure: SPLANCHNIC NERVE BLOCK;  Surgeon: Reymundo Tran MD;  [facility];  Service: Pain Management     CHOLECYSTECTOMY      ESOPHAGOGASTRODUODENOSCOPY N/A 11/16/2017    Procedure: ESOPHAGOGASTRODUODENOSCOPY (EGD);   Surgeon: Javier Villar MD;  [facility]; Service: Gastroenterology    ESOPHAGOGASTRODUODENOSCOPY N/A 4/19/2018    Procedure: ESOPHAGOGASTRODUODENOSCOPY (EGD); Surgeon: Sharon Rossi MD;  [facility]; Service: Gastroenterology    ESOPHAGOGASTRODUODENOSCOPY N/A 5/10/2018    Procedure: ESOPHAGOGASTRODUODENOSCOPY (EGD); Surgeon: Tej Carter MD;  [facility];   Service: Gastroenterology    IR TEMP HD CATH  2/28/2019    KNEE ARTHROSCOPY Bilateral     KNEE ARTHROSCOPY Right 2009    cibishino last assessed 08/24/2016    KNEE ARTHROSCOPY Right 07/01/2019    LIVER SURGERY      NERVE BLOCK Bilateral 5/29/2018    Procedure: SPLANCHNIC NERVE BLOCK;  Surgeon: Kristina Walsh MD;  [facility];  Service: Pain Management     PANCREAS SURGERY      stents    PANCREATIC CYST EXCISION      VT INJECT NERV BLCK,CELIAC PLEXUS Bilateral 5/9/2017    Procedure: CELIAC PLEXUS BLOCK ;  Surgeon: Kristina Walsh MD;  [facility];  Service: Pain Management     VT INJECT NERV BLCK,CELIAC PLEXUS Bilateral 6/1/2017    Procedure: SPLANCHNIC NERVE BLOCK at T12;  Surgeon: Kristina Walsh MD;  [facility];  Service: Pain Management     VT INJECT NERV BLCK,CELIAC PLEXUS Bilateral 8/8/2017    Procedure: BILATERAL SPLANCHNIC NERVE BLOCK T12;  Surgeon: Kristina Walsh MD;  [facility];  Service: Pain Management     VT INJECT NERV BLCK,CELIAC PLEXUS Bilateral 4/18/2019    Procedure: BLOCK / INJECTION CELIAC PLEXUS;  Surgeon: Kristina Walsh MD;  [facility];  Service: Pain Management     VT INJECT NERV BLCK,CELIAC PLEXUS Bilateral 8/20/2019    Procedure: SPLANCHNIC NERVE BLOCK;  Surgeon: Kristina Walsh MD;  [facility];  Service: Pain Management     VT INJECT NERV [address] Bilateral 10/17/2019    Procedure: SPLANCHNIC NERVE BLOCK;  Surgeon: Kristina Walsh MD;  [facility];  Service: Pain Management     VT INJECT NERV BLCK,PARAVERT SYMPATH Bilateral 12/28/2017    Procedure: SPLANCHNIC NERVE BLOCK;  Surgeon: Dameon Chavez MD;  [facility];  Service: Pain Management     [place] LAP,CHOLECYSTECTOMY N/A 2/14/2017    Procedure: LAPAROSCOPIC CHOLECYSTECTOMY, IOC, POSSIBLE OPEN ;  Surgeon: Sirisha Ruffin MD;  [facility];  Service: General    ROTATOR CUFF REPAIR Right     SHOULDER ARTHROSCOPY      SHOULDER ARTHROSCOPY Right        Family History   Problem Relation Age of Onset    Cirrhosis Mother     Heart disease Other         cardiac disorder    Cancer Other      I have reviewed and agree with the history as documented  Social History     Tobacco Use    Smoking status: Current Every Day Smoker     Packs/day: 0 50     Years: 20 00     Pack years: 10 00    Smokeless tobacco: Never Used   Substance Use Topics    Alcohol use: Yes     Alcohol/week: 10 0 standard drinks     Types: 10 Cans of beer per week     Drinks per session: 3 or 4     Comment: quit    Drug use: No        Review of Systems   Constitutional: Negative for chills, fatigue and fever  HENT: Negative for congestion and sore throat  Eyes: Negative for visual disturbance  Respiratory: Negative for shortness of breath and wheezing  Cardiovascular: Negative for chest pain and palpitations  Gastrointestinal: Positive for abdominal pain (epigastric) and anorexia  Negative for constipation, diarrhea, nausea and vomiting  Genitourinary: Negative for dysuria and hematuria  Musculoskeletal: Negative for neck pain and neck stiffness  Skin: Negative for pallor and rash  Neurological: Negative for headaches  Psychiatric/Behavioral: Negative for confusion  All other systems reviewed and are negative  Physical Exam  Physical Exam   Constitutional: He is oriented to person, place, and time  He appears well-developed and well-nourished  No distress  HENT:   Head: Normocephalic and atraumatic     Right Ear: External ear normal    Left Ear: External ear normal    Mouth/Throat: Oropharynx is clear and moist  Eyes: EOM are normal    Neck: Neck supple  Cardiovascular: Normal rate and regular rhythm  No murmur heard  Pulmonary/Chest: Effort normal and breath sounds normal    Abdominal: Soft  Bowel sounds are normal  He exhibits no distension  There is tenderness in the epigastric area  Musculoskeletal: Normal range of motion  He exhibits no edema  Neurological: He is alert and oriented to person, place, and time  Skin: Skin is warm  No rash noted  No pallor  Psychiatric: He has a normal mood and affect  His behavior is normal    Nursing note and vitals reviewed        Vital Signs  ED Triage Vitals   Temperature Pulse Respirations Blood Pressure SpO2   01/15/20 1511 01/15/20 1511 01/15/20 1511 01/15/20 1511 01/15/20 1511   98 6 °F (37 °C) 100 18 (!) 183/105 95 %      Temp Source Heart Rate Source Patient Position - Orthostatic VS BP Location FiO2 (%)   01/15/20 1511 01/15/20 1511 01/15/20 1511 01/15/20 1511 --   Oral Monitor Sitting Left arm       Pain Score       01/15/20 1646       9           Vitals:    01/15/20 1511 01/15/20 1727   BP: (!) 183/105 162/88   Pulse: 100 79   Patient Position - Orthostatic VS: Sitting Lying         Visual Acuity      ED Medications  Medications   sodium chloride 0 9 % bolus 1,000 mL (1,000 mL Intravenous Not Given 1/15/20 1812)   HYDROmorphone (DILAUDID) injection 1 mg (1 mg Intravenous Given 1/15/20 1646)   sodium chloride 0 9 % bolus 1,000 mL (0 mL Intravenous Stopped 1/15/20 1812)   iohexol (OMNIPAQUE) 350 MG/ML injection (MULTI-DOSE) 100 mL (100 mL Intravenous Given 1/15/20 1714)   pantoprazole (PROTONIX) injection 40 mg (40 mg Intravenous Given 1/15/20 1812)   sucralfate (CARAFATE) oral suspension 1,000 mg (1,000 mg Oral Given 1/15/20 1812)       Diagnostic Studies  Results Reviewed     Procedure Component Value Units Date/Time    Comprehensive metabolic panel [[id number]]  (Abnormal) Collected:  01/15/20 1516    Lab Status:  Final result Specimen:  Blood from Arm, Right Updated:  01/15/20 1559     Sodium 137 mmol/L      Potassium 4 1 mmol/L      Chloride 102 mmol/L      CO2 18 mmol/L      ANION GAP 17 mmol/L      BUN 11 mg/dL      Creatinine 0 95 mg/dL      Glucose 194 mg/dL      Calcium 8 6 mg/dL       U/L       U/L      Alkaline Phosphatase 167 U/L      Total Protein 7 6 g/dL      Albumin 3 7 g/dL      Total Bilirubin 1 10 mg/dL      eGFR 96 ml/min/1 73sq m     Narrative:       National Kid[employer] guidelines for Chronic Kidney Disease (CKD):     Stage 1 with normal or high GFR (GFR > 90 mL/min/1 73 square meters)    Stage 2 Mild CKD (GFR = 60-89 mL/min/1 73 square meters)    Stage 3A Moderate CKD (GFR = 45-59 mL/min/1 73 square meters)    Stage 3B Moderate CKD (GFR = 30-44 mL/min/1 73 square meters)    Stage 4 Severe CKD (GFR = 15-29 mL/min/1 73 square meters)    Stage 5 End Stage CKD (GFR <15 mL/min/1 73 square meters)  Note: GFR calculation is accurate only with a steady state creatinine    Lipase [[id number]]  (Normal) Collected:  01/15/20 1516    Lab Status:  Final result Specimen:  Blood from Arm, Right Updated:  01/15/20 1559     Lipase 156 u/L     CBC and differential [[id number]]  (Abnormal) Collected:  01/15/20 1516    Lab Status:  Final result Specimen:  Blood from Arm, Right Updated:  01/15/20 1527     WBC 5 37 Thousand/uL      RBC 4 57 Million/uL      Hemoglobin 16 1 g/dL      Hematocrit 45 9 %       fL      MCH 35 2 pg      MCHC 35 1 g/dL      RDW 12 4 %      MPV 11 6 fL      Platelets 755 Thousands/uL      nRBC 0 /100 WBCs      Neutrophils Relative 63 %      Immat GRANS % 0 %      Lymphocytes Relative 28 %      Monocytes Relative 7 %      Eosinophils Relative 1 %      Basophils Relative 1 %      Neutrophils Absolute 3 44 Thousands/µL      Immature Grans Absolute 0 01 Thousand/uL      Lymphocytes Absolute 1 48 Thousands/µL      Monocytes Absolute 0 35 Thousand/µL      Eosinophils Absolute 0 06 Thousand/µL      Basophils Absolute 0 03 Thousands/µL                  CT abdomen pelvis with contrast   Final Result by Evelyn Chang DO (01/15 1752)   Diffuse gastric wall thickening similar prior exam concerning for gastritis  Stable hepatosplenomegaly with fatty infiltration  Workstation performed: [id number]                    Procedures  Procedures         ED Course  ED Course as of Fran 15 1921   Wed Fran 15, 2020   1549 Pt seen and examined  52 yo male who was sent by PCP for eval due to recurrent epigastric pain c/w his pancreatitis due to high trigycerides (PCP and endocrine trying to control)  Pain worsened after work yesterday (about 24 hours)  Denies nausea currently  Will give 2 LIVF, dilaudid and check labs, CT a/p        1655 Pt difficult stick, IV established and medicated and IVF x 2 - awaiting CT a/p       4896 CT shows Diffuse gastric wall thickening similar prior exam concerning for gastritis  Stable hepatosplenomegaly with fatty infiltration  Discussed with pt - will treat with protonix (takes at home along with pepcid) and carafate - will give script for carafate  MDM      Disposition  Final diagnoses:   Gastritis     Time reflects when diagnosis was documented in both MDM as applicable and the Disposition within this note     Time User Action Codes Description Comment    1/15/2020  6:02 PM Pennie Golden Add [K29 70] Gastritis       ED Disposition     ED Disposition Condition Date/Time Comment    Discharge Stable Wed Fran 15, 2020  6:02 PM Stefani Saab discharge to home/self care              Follow-up Information     Follow up With Specialties Details Why Contact Cesario Barnard, [address] Nurse Practitioner Schedule an appointment as soon as possible for a visit  As needed [address] [place] [address]  [phone]            Discharge Medication List as of 1/15/2020  6:02 PM      START taking these medications    Details sucralfate (CARAFATE) 1 g/10 mL suspension Take 10 mL (1 g total) by mouth 4 (four) times a day (with meals and at bedtime), Starting Wed 1/15/2020, Print         CONTINUE these medications which have NOT CHANGED    Details   albuterol (VENTOLIN HFA) 90 mcg/act inhaler Inhale 2 puffs every 6 (six) hours as needed for wheezing Prn, Starting Thu 11/14/2019, Until Fri 11/13/2020, Normal      Choline Fenofibrate (FENOFIBRIC ACID) 135 MG CPDR Take 1 capsule (135 mg total) by mouth daily, Starting Mon 1/6/2020, Normal      EPINEPHrine (EPIPEN 2-ARIEL) 0 3 mg/0 3 mL SOAJ EpiPen 2-Ariel 0 3 MG/0 3ML Injection Solution Auto-injector  INJECT 0 3ML INTRAMUSCULARLY AS DIRECTED  Quantity: 1;  Refills: 1      Toro Briceño ; Active  2 Solution Auto-injector Pen, Historical Med      Evolocumab 140 MG/ML SOAJ Inject 140 mg under the skin every 14 (fourteen) days, Starting Fri 12/6/2019, Historical Med      famotidine (PEPCID) 40 MG tablet Take 40 mg by mouth 2 (two) times a day, Historical Med      HYDROcodone-acetaminophen (NORCO) 7 5-325 mg per tablet every 8 (eight) hours, Historical Med      insulin degludec (TRESIBA) 100 units/mL injection pen 15 units subcut in hs, Historical Med      omega-3-acid ethyl esters (LOVAZA) 1 g capsule Take 2 capsules (2 g total) by mouth 2 (two) times a day, Starting Tue 12/10/2019, Normal      ondansetron (ZOFRAN-ODT) 4 mg disintegrating tablet Take 1 tablet (4 mg total) by mouth every 8 (eight) hours as needed for nausea or vomiting, Starting Mon 1/6/2020, Normal      !! pancrelipase, Lip-Prot-Amyl, (CREON) 12,000 units capsule Take 12,000 units of lipase by mouth as needed for snacks, Starting Thu 4/4/2019, Normal      !!  Pancrelipase, Lip-Prot-Amyl, (CREON) 75383 units CPEP Take 1 capsule (36,000 Units total) by mouth 3 (three) times a day before meals, Starting Thu 4/4/2019, Normal      pantoprazole (PROTONIX) 40 mg tablet Take 1 tablet (40 mg total) by mouth 2 (two) times a day, Starting Mon 1/6/2020, Normal      pregabalin (LYRICA) 100 mg capsule Take 1 capsule (100 mg total) by mouth 3 (three) times a day, Starting Wed 11/6/2019, Normal      rosuvastatin (CRESTOR) 40 MG tablet Take 1 tablet (40 mg total) by mouth daily, Starting Mon 1/6/2020, Normal      traZODone (DESYREL) 50 mg tablet Take 1 tablet (50 mg total) by mouth daily at bedtime, Starting Sun 12/22/2019, Normal       !! - Potential duplicate medications found  Please discuss with provider  No discharge procedures on file      ED Provider  Electronically Signed by           Juan Miguel Banerjee DO  01/15/20 4877

## 2020-01-15 NOTE — ED NOTES
This nurse attempted an IV X2 and was unable to obtain   Anya Bañuelos was contacted to come and do an IV with the ultrasound at this time     Steven Bocanegra RN  01/15/20 4754

## 2020-01-15 NOTE — PROGRESS NOTES
[facility]  Rosy Oviedo 53 y o  male MRN: [id number]       CC: Abdominal pain, fever, history of pancreatitis secondary to hypertriglyceridemia    HPI: Mr Reid Simmons is a 53year old male with history of chronic pancreatitis complicated by pseudocyst the past, status post celiac plexus block, status post cholecystectomy and tobacco abuse  Patient has history of hypertriglyceridemia requiring patient have insulin drip and plasmapheresis in the past   Patient is here with new onset abdominal pain that began about 2 days ago  He reports that he did have fever last night  His pain is located in the left upper quadrant radiating to his back  In December, he followed up with Endocrinology at [facility]  His triglycerides were above 3000 at that point  Patient reports that he was recommended medication, however his insurance is not covering this  He did not go to the hospital sooner as his wife recently started night shift and they have 2 elderly dogs at home that he is taking care of  Patient's last EGD was in May  This revealed mild reflux esophagitis  Otherwise, exam was normal     Review of Systems:    CONSTITUTIONAL: Denies any fever, chills, or rigors  Good appetite, and no recent weight loss  HEENT: No earache or tinnitus  Denies hearing loss or visual disturbances  CARDIOVASCULAR: No chest pain or palpitations  RESPIRATORY: Denies any cough, hemoptysis, shortness of breath or dyspnea on exertion  GASTROINTESTINAL: As noted in the History of Present Illness  GENITOURINARY: No problems with urination  Denies any hematuria or dysuria  NEUROLOGIC: No dizziness or vertigo, denies headaches  MUSCULOSKELETAL: Denies any muscle or joint pain  SKIN: Denies skin rashes or itching  ENDOCRINE: Denies excessive thirst  Denies intolerance to heat or cold  PSYCHOSOCIAL: Denies depression or anxiety  Denies any recent memory loss         Current Outpatient Medications Medication Sig Dispense Refill    Evolocumab 140 MG/ML SOAJ Inject 140 mg under the skin every 14 (fourteen) days      insulin degludec (TRESIBA) 100 units/mL injection pen 15 units subcut in hs      albuterol (VENTOLIN HFA) 90 mcg/act inhaler Inhale 2 puffs every 6 (six) hours as needed for wheezing Prn 3 Inhaler 0    Choline Fenofibrate (FENOFIBRIC ACID) 135 MG CPDR Take 1 capsule (135 mg total) by mouth daily 90 capsule 0    EPINEPHrine (EPIPEN 2-ARIEL) 0 3 mg/0 3 mL SOAJ EpiPen 2-Ariel 0 3 MG/0 3ML Injection Solution Auto-injector  INJECT 0 3ML INTRAMUSCULARLY AS DIRECTED  Quantity: 1;  Refills: 1      Toro Briceño ; Active  2 Solution Auto-injector Pen      famotidine (PEPCID) 40 MG tablet Take 40 mg by mouth 2 (two) times a day      HYDROcodone-acetaminophen (NORCO) 7 5-325 mg per tablet every 8 (eight) hours      omega-3-acid ethyl esters (LOVAZA) 1 g capsule Take 2 capsules (2 g total) by mouth 2 (two) times a day 360 capsule 0    ondansetron (ZOFRAN-ODT) 4 mg disintegrating tablet Take 1 tablet (4 mg total) by mouth every 8 (eight) hours as needed for nausea or vomiting 30 tablet 3    pancrelipase, Lip-Prot-Amyl, (CREON) 12,000 units capsule Take 12,000 units of lipase by mouth as needed for snacks 90 capsule 2    Pancrelipase, Lip-Prot-Amyl, (CREON) 57419 units CPEP Take 1 capsule (36,000 Units total) by mouth 3 (three) times a day before meals 268 capsule 2    pantoprazole (PROTONIX) 40 mg tablet Take 1 tablet (40 mg total) by mouth 2 (two) times a day 180 tablet 3    pregabalin (LYRICA) 100 mg capsule Take 1 capsule (100 mg total) by mouth 3 (three) times a day 90 capsule 2    rosuvastatin (CRESTOR) 40 MG tablet Take 1 tablet (40 mg total) by mouth daily 90 tablet 0    traZODone (DESYREL) 50 mg tablet Take 1 tablet (50 mg total) by mouth daily at bedtime 30 tablet 0     No current facility-administered medications for this visit        Past Medical History:   Diagnosis Date    Asthma     Chronic pain disorder     GERD (gastroesophageal reflux disease)     Hyperlipidemia     Liver abscess     Meniscus tear     left knee work injury last assessed 08/24/2016    Pancreatitis     Pneumonia      Past Surgical History:   Procedure Laterality Date    CELIAC PLEXUS BLOCK Bilateral 10/29/2018    Procedure: SPLANCHNIC NERVE BLOCK;  Surgeon: Eliud Guardado MD;  [facility];  Service: Pain Management     CELIAC PLEXUS BLOCK Bilateral 1/10/2019    Procedure: SPLANCHNIC NERVE BLOCK;  Surgeon: Eliud Guardado MD;  [facility];  Service: Pain Management     CHOLECYSTECTOMY      ESOPHAGOGASTRODUODENOSCOPY N/A 11/16/2017    Procedure: ESOPHAGOGASTRODUODENOSCOPY (EGD); Surgeon: Jasper Velásquez MD;  [facility]; Service: Gastroenterology    ESOPHAGOGASTRODUODENOSCOPY N/A 4/19/2018    Procedure: ESOPHAGOGASTRODUODENOSCOPY (EGD); Surgeon: Mariposa Owens MD;  [facility]; Service: Gastroenterology    ESOPHAGOGASTRODUODENOSCOPY N/A 5/10/2018    Procedure: ESOPHAGOGASTRODUODENOSCOPY (EGD); Surgeon: Barry Pascual MD;  [facility];   Service: Gastroenterology    IR TEMP HD CATH  2/28/2019    KNEE ARTHROSCOPY Bilateral     KNEE ARTHROSCOPY Right 2009    cibishino last assessed 08/24/2016    KNEE ARTHROSCOPY Right 07/01/2019    LIVER SURGERY      NERVE BLOCK Bilateral 5/29/2018    Procedure: SPLANCHNIC NERVE BLOCK;  Surgeon: Eliud Guardado MD;  [facility];  Service: Pain Management     PANCREAS SURGERY      stents    PANCREATIC CYST EXCISION      DE INJECT NERV [address] Bilateral 5/9/2017    Procedure: CELIAC PLEXUS BLOCK ;  Surgeon: Eliud Guardado MD;  [facility];  Service: Pain Management     DE INJECT NERV BLCK,CELIAC PLEXUS Bilateral 6/1/2017    Procedure: SPLANCHNIC NERVE BLOCK at T12;  Surgeon: Eliud Guardado MD;  [facility];  Service: Pain Management     DE INJECT NERV BLCK,CELIAC PLEXUS Bilateral 8/8/2017    Procedure: BILATERAL SPLANCHNIC NERVE BLOCK T12;  Surgeon: Dameon Chavez MD;  [facility];  Service: Pain Management     AL INJECT NERV BLCK,CELIAC PLEXUS Bilateral 4/18/2019    Procedure: BLOCK / INJECTION CELIAC PLEXUS;  Surgeon: Dameon Chavez MD;  [facility];  Service: Pain Management     AL INJECT NERV BLCK,CELIAC PLEXUS Bilateral 8/20/2019    Procedure: SPLANCHNIC NERVE BLOCK;  Surgeon: Dameon Chavez MD;  [facility];  Service: Pain Management     AL INJECT NERV BLCK,CELIAC PLEXUS Bilateral 10/17/2019    Procedure: SPLANCHNIC NERVE BLOCK;  Surgeon: Dameon Chavez MD;  [facility];  Service: Pain Management     AL INJECT NERV Benjamín Sprinkle Bilateral 12/28/2017    Procedure: SPLANCHNIC NERVE BLOCK;  Surgeon: Dameon Chavez MD;  [facility];  Service: Pain Management     AL LAP,CHOLECYSTECTOMY N/A 2/14/2017    Procedure: LAPAROSCOPIC CHOLECYSTECTOMY, IOC, POSSIBLE OPEN ;  Surgeon: Sirisha Ruffin MD;  [facility];  Service: General    ROTATOR CUFF REPAIR Right     SHOULDER ARTHROSCOPY      SHOULDER ARTHROSCOPY Right      Social History     Socioeconomic History    Marital status: /Civil Union     Spouse name: None    Number of children: None    Years of education: None    Highest education level: None   Occupational History    Occupation: fulltime employment   Social Needs    Financial resource strain: None    Food insecurity:     Worry: None     Inability: None    Transportation needs:     Medical: None     Non-medical: None   Tobacco Use    Smoking status: Current Every Day Smoker     Packs/day: 0 50     Years: 20 00     Pack years: 10 00    Smokeless tobacco: Never Used   Substance and Sexual Activity    Alcohol use:  Yes     Alcohol/week: 10 0 standard drinks     Types: 10 Cans of beer per week     Drinks per session: 3 or 4    Drug use: No    Sexual activity: Yes     Partners: Female   Lifestyle    Physical activity:     Days per week: None     Minutes per session: None    Stress: None   Relationships    Social connections:     Talks on phone: None     Gets together: None     Attends Taoism service: None     Active member of club or organization: None     Attends meetings of clubs or organizations: None     Relationship status: None    Intimate partner violence:     Fear of current or ex partner: None     Emotionally abused: None     Physically abused: None     Forced sexual activity: None   Other Topics Concern    None   Social History Narrative    Lives with family    Daily caffeine consumption     Family History   Problem Relation Age of Onset    Cirrhosis Mother     Heart disease Other         cardiac disorder    Cancer Other             PHYSICAL EXAM:    Vitals:    01/15/20 1359   BP: 168/92   Pulse: 91   Weight: 95 3 kg (210 lb 3 2 oz)   Height: 5' 11" (1 803 m)     General Appearance:   Alert and oriented x 3   Cooperative, and in no respiratory distress   HEENT:   Normocephalic, atraumatic, anicteric      Neck:  Supple, symmetrical, trachea midline   Lungs:   Clear to auscultation bilaterally    Heart[de-identified]   Regular rate and rhythm   Abdomen:   Soft, non-tender, non-distended; normal bowel sounds; no masses, no organomegaly    Genitalia:   Deferred    Rectal:   Deferred    Extremities:  No cyanosis, clubbing or edema    Pulses:  2+ and symmetric all extremities    Skin:  Skin color, texture, turgor normal, no rashes or lesions    Lymph nodes:  No palpable cervical or supraclavicular lymphadenopathy        Lab Results:   Results from last 6 Months   Lab Units 09/09/19  0426   WBC Thousand/uL 2 66*   HEMOGLOBIN g/dL 11 5*   HEMATOCRIT % 35 3*   PLATELETS Thousands/uL 77*   NEUTROS PCT % 38*   LYMPHS PCT % 47*   MONOS PCT % 13*   EOS PCT % 2     Results from last 6 Months   Lab Units 09/09/19  0426   POTASSIUM mmol/L 3 8   CHLORIDE mmol/L 102   CO2 mmol/L 29   BUN mg/dL 7   CREATININE mg/dL 0 78 CALCIUM mg/dL 8 8   ALK PHOS U/L 144*   ALT U/L 86*   AST U/L 37     Results from last 6 Months   Lab Units 08/20/19  1145   INR  1 25*     Results from last 6 Months   Lab Units 09/08/19  0434   LIPASE u/L 62*   AMYLASE IU/L 26       Imaging Studies: I have personally reviewed pertinent imaging studies  Fl < 1 Hour    Result Date: 10/18/2019  Impression: Fluoroscopic guidance provided for surgical procedure  Please refer to the separate procedure notes for additional details  Workstation performed: [id number]       ASSESSMENT and PLAN:      1) Mid abdominal pain, fever and history of hypertriglyceridemia resulting in pancreatitis - Based on patient's symptoms and history, in fearful that he does have another episode of pancreatitis  He had elevated triglycerides above 3000 in December  His endocrinologist wanted to start him on Repatha, however his insurance is not covering this  - Instructed patient to be seen in the emergency room    I offered ambulance to transfer him, however he is refusing   - Made our GI team inpatient aware        Follow up after ED eval

## 2020-01-24 ENCOUNTER — TELEPHONE (OUTPATIENT)
Dept: GASTROENTEROLOGY | Facility: CLINIC | Age: 45
End: 2020-01-24

## 2020-01-24 NOTE — TELEPHONE ENCOUNTER
Spoke to pt and advised, told pt to CB if he does not hear from anyone to schedule his EUS after a week  [place] please advise colon, thank you!

## 2020-01-24 NOTE — TELEPHONE ENCOUNTER
----- Message from Darryle Billet sent at 1/24/2020  9:40 AM EST -----  Regarding: Non-Urgent Medical Question  Contact: [phone]  I know Radha Gauthier had wanted me to schedule an endoscopic ultrasound to look at my stomach lining and I think my pancreas  , at the same time wanted me to get a colonoscopy  Since my last visit to the [facility]  I think maybe its time to do so

## 2020-01-24 NOTE — TELEPHONE ENCOUNTER
Spoke with patient he was advise to make a follow-up appointment after his recent visit to ER  He was advised to have EGD and colonoscopy by Dinora at an earlier visit    Xander Linton how should we proceed with scheduling patient

## 2020-01-24 NOTE — TELEPHONE ENCOUNTER
We can schedule EUS, I will forward this to our [facility] office  He will need to schedule colonoscopy at another time since they are usually not done at the same time  Thank you  Ansley Douglas! He needs EUS for chronic pancreatitis and gastric wall thickening  Thank you!

## 2020-02-12 ENCOUNTER — TELEPHONE (OUTPATIENT)
Dept: GASTROENTEROLOGY | Facility: CLINIC | Age: 45
End: 2020-02-12

## 2020-02-12 DIAGNOSIS — E78.1 HYPERTRIGLYCERIDEMIA: ICD-10-CM

## 2020-02-12 DIAGNOSIS — E78.2 MIXED HYPERLIPIDEMIA: ICD-10-CM

## 2020-02-12 RX ORDER — FENOFIBRIC ACID 135 MG/1
CAPSULE, DELAYED RELEASE ORAL
Qty: 90 CAPSULE | Refills: 0 | Status: SHIPPED | OUTPATIENT
Start: 2020-02-12 | End: 2020-05-06 | Stop reason: SDUPTHER

## 2020-02-12 NOTE — TELEPHONE ENCOUNTER
Spoke with patient he was scheduled for EUS on 3/16 was told they could not do colonoscopy the same day  Please assist with scheduling colonoscopy for patient

## 2020-02-12 NOTE — TELEPHONE ENCOUNTER
----- Message from Tri Marvin sent at 2/12/2020  8:59 AM EST -----  Regarding: Non-Urgent Medical Question  Contact: [phone]  I did get the appointment for the endoscopic ultrasound on March 16th  Although they did not schedule the colonoscopy

## 2020-02-12 NOTE — TELEPHONE ENCOUNTER
----- Message from Denice  sent at 2/12/2020  8:59 AM EST -----  Regarding: Non-Urgent Medical Question  Contact: [phone]  I did get the appointment for the endoscopic ultrasound on March 16th  Although they did not schedule the colonoscopy

## 2020-02-23 ENCOUNTER — ANESTHESIA EVENT (OUTPATIENT)
Dept: GASTROENTEROLOGY | Facility: HOSPITAL | Age: 45
End: 2020-02-23

## 2020-02-24 ENCOUNTER — HOSPITAL ENCOUNTER (OUTPATIENT)
Dept: GASTROENTEROLOGY | Facility: HOSPITAL | Age: 45
Setting detail: OUTPATIENT SURGERY
Discharge: HOME | End: 2020-02-24
Attending: INTERNAL MEDICINE | Admitting: INTERNAL MEDICINE
Payer: COMMERCIAL

## 2020-02-24 ENCOUNTER — ANESTHESIA (OUTPATIENT)
Dept: GASTROENTEROLOGY | Facility: HOSPITAL | Age: 45
End: 2020-02-24

## 2020-02-24 VITALS
HEIGHT: 71 IN | BODY MASS INDEX: 28.7 KG/M2 | TEMPERATURE: 97.6 F | RESPIRATION RATE: 26 BRPM | DIASTOLIC BLOOD PRESSURE: 64 MMHG | HEART RATE: 67 BPM | SYSTOLIC BLOOD PRESSURE: 154 MMHG | WEIGHT: 205.03 LBS | OXYGEN SATURATION: 95 %

## 2020-02-24 DIAGNOSIS — Z12.11 COLON CANCER SCREENING: ICD-10-CM

## 2020-02-24 LAB — GLUCOSE SERPL-MCNC: 89 MG/DL (ref 65–140)

## 2020-02-24 PROCEDURE — 88305 TISSUE EXAM BY PATHOLOGIST: CPT | Performed by: PATHOLOGY

## 2020-02-24 PROCEDURE — 82948 REAGENT STRIP/BLOOD GLUCOSE: CPT

## 2020-02-24 PROCEDURE — 45385 COLONOSCOPY W/LESION REMOVAL: CPT | Performed by: INTERNAL MEDICINE

## 2020-02-24 RX ORDER — PROPOFOL 10 MG/ML
INJECTION, EMULSION INTRAVENOUS AS NEEDED
Status: DISCONTINUED | OUTPATIENT
Start: 2020-02-24 | End: 2020-02-24 | Stop reason: SURG

## 2020-02-24 RX ORDER — SODIUM CHLORIDE, SODIUM LACTATE, POTASSIUM CHLORIDE, CALCIUM CHLORIDE 600; 310; 30; 20 MG/100ML; MG/100ML; MG/100ML; MG/100ML
125 INJECTION, SOLUTION INTRAVENOUS CONTINUOUS
Status: DISCONTINUED | OUTPATIENT
Start: 2020-02-24 | End: 2020-02-28 | Stop reason: HOSPADM

## 2020-02-24 RX ORDER — LIDOCAINE HYDROCHLORIDE 10 MG/ML
INJECTION, SOLUTION EPIDURAL; INFILTRATION; INTRACAUDAL; PERINEURAL AS NEEDED
Status: DISCONTINUED | OUTPATIENT
Start: 2020-02-24 | End: 2020-02-24 | Stop reason: SURG

## 2020-02-24 RX ADMIN — PROPOFOL 50 MG: 10 INJECTION, EMULSION INTRAVENOUS at 07:44

## 2020-02-24 RX ADMIN — SODIUM CHLORIDE, SODIUM LACTATE, POTASSIUM CHLORIDE, AND CALCIUM CHLORIDE: .6; .31; .03; .02 INJECTION, SOLUTION INTRAVENOUS at 07:14

## 2020-02-24 RX ADMIN — PROPOFOL 50 MG: 10 INJECTION, EMULSION INTRAVENOUS at 07:45

## 2020-02-24 RX ADMIN — PROPOFOL 50 MG: 10 INJECTION, EMULSION INTRAVENOUS at 07:42

## 2020-02-24 RX ADMIN — PROPOFOL 50 MG: 10 INJECTION, EMULSION INTRAVENOUS at 07:38

## 2020-02-24 RX ADMIN — PROPOFOL 120 MG: 10 INJECTION, EMULSION INTRAVENOUS at 07:35

## 2020-02-24 RX ADMIN — PROPOFOL 30 MG: 10 INJECTION, EMULSION INTRAVENOUS at 07:36

## 2020-02-24 RX ADMIN — PROPOFOL 50 MG: 10 INJECTION, EMULSION INTRAVENOUS at 07:43

## 2020-02-24 RX ADMIN — LIDOCAINE HYDROCHLORIDE 20 MG: 10 INJECTION, SOLUTION EPIDURAL; INFILTRATION; INTRACAUDAL; PERINEURAL at 07:35

## 2020-02-24 RX ADMIN — PROPOFOL 50 MG: 10 INJECTION, EMULSION INTRAVENOUS at 07:37

## 2020-02-24 NOTE — ANESTHESIA POSTPROCEDURE EVALUATION
Post-Op Assessment Note    CV Status:  Stable       Mental Status:  Awake and alert   Hydration Status:  Stable   PONV Controlled:  None   Airway Patency:  Patent   Post Op Vitals Reviewed: Yes      Staff: CRNA           /71 (02/24/20 0750)    Temp 97 6 °F (36 4 °C) (02/24/20 0750)    Pulse 72 (02/24/20 0750)   Resp 16 (02/24/20 0750)    SpO2 92 % (02/24/20 0750)    Post procedure VS noted above, SV non obstructed on RA

## 2020-02-24 NOTE — DISCHARGE INSTRUCTIONS
Colonoscopy   WHAT YOU NEED TO KNOW:   A colonoscopy is a procedure to examine the inside of your colon (intestine) with a scope  Polyps or tissue growths may have been removed during your colonoscopy  It is normal to feel bloated and to have some abdominal discomfort  You should be passing gas  If you have hemorrhoids or you had polyps removed, you may have a small amount of bleeding  DISCHARGE INSTRUCTIONS:   Seek care immediately if:   · You have a large amount of bright red blood in your bowel movements  · Your abdomen is hard and firm and you have severe pain  · You have sudden trouble breathing  Contact your healthcare provider if:   · You develop a rash or hives  · You have a fever within 24 hours of your procedure  · You have not had a bowel movement for 3 days after your procedure  · You have questions or concerns about your condition or care  Activity:   · Do not lift, strain, or run  for 3 days after your procedure  · Rest after your procedure  You have been given medicine to relax you  Do not  drive or make important decisions until the day after your procedure  Return to your normal activity as directed  · Relieve gas and discomfort from bloating  by lying on your right side with a heating pad on your abdomen  You may need to take short walks to help the gas move out  Eat small meals until bloating is relieved  If you had polyps removed: For 7 days after your procedure:  · Do not  take aspirin  · Do not  go on long car rides  Help prevent constipation:   · Eat a variety of healthy foods  Healthy foods include fruit, vegetables, whole-grain breads, low-fat dairy products, beans, lean meat, and fish  Ask if you need to be on a special diet  Your healthcare provider may recommend that you eat high-fiber foods such as cooked beans  Fiber helps you have regular bowel movements  · Drink liquids as directed    Adults should drink between 9 and 13 eight-ounce cups of liquid every day  Ask what amount is best for you  For most people, good liquids to drink are water, juice, and milk  · Exercise as directed  Talk to your healthcare provider about the best exercise plan for you  Exercise can help prevent constipation, decrease your blood pressure and improve your health  Follow up with your healthcare provider as directed:  Write down your questions so you remember to ask them during your visits  © 2017 [facility] Information is for End User's use only and may not be sold, redistributed or otherwise used for commercial purposes  All illustrations and images included in CareNotes® are the copyrighted property of A D A M , Inc  or [employer]  The above information is an  only  It is not intended as medical advice for individual conditions or treatments  Talk to your doctor, nurse or pharmacist before following any medical regimen to see if it is safe and effective for you

## 2020-02-24 NOTE — H&P
History and Physical - [facility]  Maraino Corado 53 y o  male MRN: [id number]                  HPI: Mariano Corado is a 53y o  year old male who presents for colonoscopy for abdominal pain and colon cancer screening      REVIEW OF SYSTEMS: Per the HPI, and otherwise unremarkable  Historical Information   Past Medical History:   Diagnosis Date    Asthma     Chronic pain disorder     GERD (gastroesophageal reflux disease)     Hyperlipidemia     Liver abscess     Meniscus tear     left knee work injury last assessed 08/24/2016    Pancreatitis     Pneumonia      Past Surgical History:   Procedure Laterality Date    CELIAC PLEXUS BLOCK Bilateral 10/29/2018    Procedure: SPLANCHNIC NERVE BLOCK;  Surgeon: Grayson Pink MD;  [facility];  Service: Pain Management     CELIAC PLEXUS BLOCK Bilateral 1/10/2019    Procedure: SPLANCHNIC NERVE BLOCK;  Surgeon: Grayson Pink MD;  [facility];  Service: Pain Management     CHOLECYSTECTOMY      ESOPHAGOGASTRODUODENOSCOPY N/A 11/16/2017    Procedure: ESOPHAGOGASTRODUODENOSCOPY (EGD); Surgeon: Isiah Holguin MD;  [facility]; Service: Gastroenterology    ESOPHAGOGASTRODUODENOSCOPY N/A 4/19/2018    Procedure: ESOPHAGOGASTRODUODENOSCOPY (EGD); Surgeon: Paige Ornelas MD;  [facility]; Service: Gastroenterology    ESOPHAGOGASTRODUODENOSCOPY N/A 5/10/2018    Procedure: ESOPHAGOGASTRODUODENOSCOPY (EGD); Surgeon: Kala Taylor MD;  [facility];   Service: Gastroenterology    IR TEMP HD CATH  2/28/2019    KNEE ARTHROSCOPY Bilateral     KNEE ARTHROSCOPY Right 2009    cibishino last assessed 08/24/2016    KNEE ARTHROSCOPY Right 07/01/2019    LIVER SURGERY      NERVE BLOCK Bilateral 5/29/2018    Procedure: SPLANCHNIC NERVE BLOCK;  Surgeon: Grayson Pink MD;  [facility];  Service: Pain Management     PANCREAS SURGERY      stents    PANCREATIC CYST EXCISION      NE INJECT NERV [address]  PLEXUS Bilateral 5/9/2017    Procedure: CELIAC PLEXUS BLOCK ;  Surgeon: Georgie Scales MD;  [facility];  Service: Pain Management     SD INJECT NERV BLCK,CELIAC PLEXUS Bilateral 6/1/2017    Procedure: SPLANCHNIC NERVE BLOCK at T12;  Surgeon: Georgie Scales MD;  [facility];  Service: Pain Management     SD INJECT NERV BLCK,CELIAC PLEXUS Bilateral 8/8/2017    Procedure: BILATERAL SPLANCHNIC NERVE BLOCK T12;  Surgeon: Georgie Scales MD;  [facility];  Service: Pain Management     SD INJECT NERV BLCK,CELIAC PLEXUS Bilateral 4/18/2019    Procedure: BLOCK / INJECTION CELIAC PLEXUS;  Surgeon: Georgie Scales MD;  [facility];  Service: Pain Management     SD INJECT NERV BLCK,CELIAC PLEXUS Bilateral 8/20/2019    Procedure: SPLANCHNIC NERVE BLOCK;  Surgeon: Georgie Scales MD;  [facility];  Service: Pain Management     SD INJECT NERV BLCK,CELIAC PLEXUS Bilateral 10/17/2019    Procedure: SPLANCHNIC NERVE BLOCK;  Surgeon: Georgie Scales MD;  [facility];  Service: Pain Management     SD INJECT NERV Kwesi Matty Bilateral 12/28/2017    Procedure: SPLANCHNIC NERVE BLOCK;  Surgeon: Georgie Scales MD;  [facility];  Service: Pain Management     SD LAP,CHOLECYSTECTOMY N/A 2/14/2017    Procedure: LAPAROSCOPIC CHOLECYSTECTOMY, IOC, POSSIBLE OPEN ;  Surgeon: Mike Olivera MD;  [facility];  Service: General    ROTATOR CUFF REPAIR Right     SHOULDER ARTHROSCOPY      SHOULDER ARTHROSCOPY Right      Social History   Social History     Substance and Sexual Activity   Alcohol Use Yes    Alcohol/week: 10 0 standard drinks    Types: 10 Cans of beer per week    Drinks per session: 3 or 4    Comment: quit     Social History     Substance and Sexual Activity   Drug Use No     Social History     Tobacco Use   Smoking Status Current Every Day Smoker    Packs/day: 0 50    Years: 20 00    Pack years: 10 00   Smokeless Tobacco Never Used     Family History   Problem Relation Age of Onset    Cirrhosis Mother     Heart disease Other         cardiac disorder    Cancer Other        Meds/Allergies       (Not in a hospital admission)    Allergies   Allergen Reactions    Bee Venom Swelling       Objective     Blood pressure (!) 171/104, pulse 88, temperature 98 2 °F (36 8 °C), temperature source Temporal, resp  rate 22, height 5' 11" (1 803 m), weight 93 kg (205 lb 0 4 oz), SpO2 97 %  PHYSICAL EXAM    BP (!) 171/104   Pulse 88   Temp 98 2 °F (36 8 °C) (Temporal)   Resp 22   Ht 5' 11" (1 803 m)   Wt 93 kg (205 lb 0 4 oz)   SpO2 97%   BMI 28 60 kg/m²       Gen: NAD  CV: RRR  CHEST: Clear  ABD: soft, NT/ND  EXT: no edema      ASSESSMENT/PLAN:  This is a 53y o  year old male here for colonoscopy, and he is stable and optimized for his procedure

## 2020-02-24 NOTE — ANESTHESIA PREPROCEDURE EVALUATION
Review of Systems/Medical History  Patient summary reviewed  Chart reviewed      Cardiovascular  Exercise tolerance (METS): >4,  Hyperlipidemia, Hypertension controlled, Dysrhythmias ,    Pulmonary  Smoker cigarette smoker  , Tobacco cessation counseling given Cumulative Pack Years: 25, Pneumonia, Asthma , well controlled/ stable ,        GI/Hepatic    GERD , Liver disease ,   Comment: H/o liver abscess s/p drainage     Negative  ROS        Endo/Other  Negative endo/other ROS   Comment: H/o pancreatitis    GYN  Negative gynecology ROS          Hematology   Musculoskeletal  Negative musculoskeletal ROS        Neurology  Negative neurology ROS      Psychology   Negative psychology ROS              Physical Exam    Airway    Mallampati score: II  TM Distance: >3 FB  Neck ROM: full     Dental   No notable dental hx     Cardiovascular  Rhythm: regular, Rate: normal, Cardiovascular exam normal    Pulmonary  Breath sounds clear to auscultation, Decreased breath sounds,     Other Findings        Anesthesia Plan  ASA Score- 2     Anesthesia Type- IV sedation with anesthesia with ASA Monitors  Additional Monitors:   Airway Plan:         Plan Factors-    Induction- intravenous  Postoperative Plan- Plan for postoperative opioid use  Informed Consent- Anesthetic plan and risks discussed with patient  I personally reviewed this patient with the CRNA  Discussed and agreed on the Anesthesia Plan with the CRNA  Pee Segura

## 2020-02-27 ENCOUNTER — TELEPHONE (OUTPATIENT)
Dept: GASTROENTEROLOGY | Facility: CLINIC | Age: 45
End: 2020-02-27

## 2020-02-27 NOTE — TELEPHONE ENCOUNTER
----- Message from Rj Allen MD sent at 2/27/2020  9:50 AM EST -----  Please tell him the polyps were precancerous

## 2020-02-27 NOTE — TELEPHONE ENCOUNTER
Spoke with patient advised of precancerous pathology  He voiced understanding and did not have any questions

## 2020-03-12 DIAGNOSIS — G89.4 CHRONIC PAIN SYNDROME: ICD-10-CM

## 2020-03-13 RX ORDER — PREGABALIN 100 MG/1
CAPSULE ORAL
Qty: 90 CAPSULE | Refills: 1 | Status: SHIPPED | OUTPATIENT
Start: 2020-03-13 | End: 2020-06-08

## 2020-03-15 ENCOUNTER — ANESTHESIA EVENT (OUTPATIENT)
Dept: GASTROENTEROLOGY | Facility: HOSPITAL | Age: 45
End: 2020-03-15

## 2020-03-15 NOTE — ANESTHESIA PREPROCEDURE EVALUATION
Review of Systems/Medical History  Patient summary reviewed  Chart reviewed  No history of anesthetic complications     Cardiovascular  Hyperlipidemia, Hypertension ,    Pulmonary  Smoker cigarette smoker  , Asthma ,        GI/Hepatic    GERD , Liver disease (h/o liver abscess) , Pancreatic problem (pancreatic lesion; h/o acute on chronic pancreatitis),             Endo/Other     GYN       Hematology   Musculoskeletal       Neurology   Psychology     Chronic opioid dependence Chronic pain,        EKG 8/2019:  Normal sinus rhythm  Normal ECG    Lab Results   Component Value Date    WBC 5 37 01/15/2020    HGB 16 1 01/15/2020    HCT 45 9 01/15/2020     (H) 01/15/2020     (L) 01/15/2020     Lab Results   Component Value Date    SODIUM 137 01/15/2020    K 4 1 01/15/2020     01/15/2020    CO2 18 (L) 01/15/2020    BUN 11 01/15/2020    CREATININE 0 95 01/15/2020    GLUC 194 (H) 01/15/2020    CALCIUM 8 6 01/15/2020     Lab Results   Component Value Date    INR 1 25 (H) 08/20/2019    INR 0 94 02/28/2019    INR 1 02 09/11/2018    PROTIME 15 8 (H) 08/20/2019    PROTIME 12 5 02/28/2019    PROTIME 13 3 09/11/2018           Physical Exam    Airway    Mallampati score: II  TM Distance: >3 FB  Neck ROM: full     Dental   No notable dental hx     Cardiovascular  Cardiovascular exam normal    Pulmonary  Pulmonary exam normal     Other Findings        Anesthesia Plan  ASA Score- 3     Anesthesia Type- IV sedation with anesthesia with ASA Monitors  Additional Monitors:   Airway Plan:         Plan Factors-    Induction- intravenous  Postoperative Plan-     Informed Consent- Anesthetic plan and risks discussed with patient and spouse  I personally reviewed this patient with the CRNA  Discussed and agreed on the Anesthesia Plan with the CRNA  Deonte Koehler

## 2020-03-16 ENCOUNTER — ANESTHESIA (OUTPATIENT)
Dept: GASTROENTEROLOGY | Facility: HOSPITAL | Age: 45
End: 2020-03-16

## 2020-03-16 ENCOUNTER — HOSPITAL ENCOUNTER (OUTPATIENT)
Dept: GASTROENTEROLOGY | Facility: HOSPITAL | Age: 45
Setting detail: OUTPATIENT SURGERY
Discharge: HOME | End: 2020-03-16
Attending: INTERNAL MEDICINE | Admitting: INTERNAL MEDICINE
Payer: COMMERCIAL

## 2020-03-16 VITALS
DIASTOLIC BLOOD PRESSURE: 74 MMHG | TEMPERATURE: 99 F | HEART RATE: 77 BPM | RESPIRATION RATE: 16 BRPM | OXYGEN SATURATION: 100 % | SYSTOLIC BLOOD PRESSURE: 140 MMHG

## 2020-03-16 DIAGNOSIS — K86.9 PANCREATIC LESION: ICD-10-CM

## 2020-03-16 LAB — GLUCOSE SERPL-MCNC: 69 MG/DL (ref 65–140)

## 2020-03-16 PROCEDURE — 88305 TISSUE EXAM BY PATHOLOGIST: CPT | Performed by: PATHOLOGY

## 2020-03-16 PROCEDURE — 88342 IMHCHEM/IMCYTCHM 1ST ANTB: CPT | Performed by: PATHOLOGY

## 2020-03-16 PROCEDURE — 43237 ENDOSCOPIC US EXAM ESOPH: CPT | Performed by: INTERNAL MEDICINE

## 2020-03-16 PROCEDURE — 43239 EGD BIOPSY SINGLE/MULTIPLE: CPT | Performed by: INTERNAL MEDICINE

## 2020-03-16 PROCEDURE — 82948 REAGENT STRIP/BLOOD GLUCOSE: CPT

## 2020-03-16 RX ORDER — FENTANYL CITRATE 50 UG/ML
INJECTION, SOLUTION INTRAMUSCULAR; INTRAVENOUS AS NEEDED
Status: DISCONTINUED | OUTPATIENT
Start: 2020-03-16 | End: 2020-03-16 | Stop reason: SURG

## 2020-03-16 RX ORDER — GLYCOPYRROLATE 0.2 MG/ML
INJECTION INTRAMUSCULAR; INTRAVENOUS AS NEEDED
Status: DISCONTINUED | OUTPATIENT
Start: 2020-03-16 | End: 2020-03-16 | Stop reason: SURG

## 2020-03-16 RX ORDER — PROPOFOL 10 MG/ML
INJECTION, EMULSION INTRAVENOUS CONTINUOUS PRN
Status: DISCONTINUED | OUTPATIENT
Start: 2020-03-16 | End: 2020-03-16 | Stop reason: SURG

## 2020-03-16 RX ORDER — PROPOFOL 10 MG/ML
INJECTION, EMULSION INTRAVENOUS AS NEEDED
Status: DISCONTINUED | OUTPATIENT
Start: 2020-03-16 | End: 2020-03-16 | Stop reason: SURG

## 2020-03-16 RX ORDER — SODIUM CHLORIDE 9 MG/ML
INJECTION, SOLUTION INTRAVENOUS CONTINUOUS PRN
Status: DISCONTINUED | OUTPATIENT
Start: 2020-03-16 | End: 2020-03-16 | Stop reason: SURG

## 2020-03-16 RX ADMIN — FENTANYL CITRATE 50 MCG: 50 INJECTION, SOLUTION INTRAMUSCULAR; INTRAVENOUS at 15:03

## 2020-03-16 RX ADMIN — GLYCOPYRROLATE 0.2 MG: 0.2 INJECTION, SOLUTION INTRAMUSCULAR; INTRAVENOUS at 15:03

## 2020-03-16 RX ADMIN — PROPOFOL 100 MG: 10 INJECTION, EMULSION INTRAVENOUS at 15:03

## 2020-03-16 RX ADMIN — PROPOFOL 50 MG: 10 INJECTION, EMULSION INTRAVENOUS at 15:30

## 2020-03-16 RX ADMIN — PROPOFOL 140 MCG/KG/MIN: 10 INJECTION, EMULSION INTRAVENOUS at 15:03

## 2020-03-16 RX ADMIN — PROPOFOL 50 MG: 10 INJECTION, EMULSION INTRAVENOUS at 15:24

## 2020-03-16 RX ADMIN — SODIUM CHLORIDE: 0.9 INJECTION, SOLUTION INTRAVENOUS at 14:54

## 2020-03-16 RX ADMIN — PROPOFOL 50 MG: 10 INJECTION, EMULSION INTRAVENOUS at 15:20

## 2020-03-16 RX ADMIN — FENTANYL CITRATE 50 MCG: 50 INJECTION, SOLUTION INTRAMUSCULAR; INTRAVENOUS at 15:14

## 2020-03-16 RX ADMIN — PROPOFOL 50 MG: 10 INJECTION, EMULSION INTRAVENOUS at 15:05

## 2020-03-16 RX ADMIN — PROPOFOL 50 MG: 10 INJECTION, EMULSION INTRAVENOUS at 15:40

## 2020-03-16 NOTE — ANESTHESIA POSTPROCEDURE EVALUATION
Post-Op Assessment Note    CV Status:  Stable    Pain management: adequate     Mental Status:  Alert and awake   Hydration Status:  Euvolemic   PONV Controlled:  Controlled   Airway Patency:  Patent   Post Op Vitals Reviewed: Yes      Staff: CRNA           /74 (03/16/20 1559)    Temp      Pulse 77 (03/16/20 1559)   Resp (!) 97 (03/16/20 1559)    SpO2 98 % (03/16/20 1559)

## 2020-03-16 NOTE — H&P
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 presents with gastric wall thickening and chronic pancreatitis  Review of Systems    Historical Information   Past Medical History:   Diagnosis Date    Asthma     Chronic pain disorder     GERD (gastroesophageal reflux disease)     Hyperlipidemia     Liver abscess     Meniscus tear     left knee work injury last assessed 08/24/2016    Pancreatitis     Pneumonia      Past Surgical History:   Procedure Laterality Date    CELIAC PLEXUS BLOCK Bilateral 10/29/2018    Procedure: SPLANCHNIC NERVE BLOCK;  Surgeon: Gabby Turner MD;  [facility];  Service: Pain Management     CELIAC PLEXUS BLOCK Bilateral 1/10/2019    Procedure: SPLANCHNIC NERVE BLOCK;  Surgeon: Gabby Turner MD;  [facility];  Service: Pain Management     CHOLECYSTECTOMY      ESOPHAGOGASTRODUODENOSCOPY N/A 11/16/2017    Procedure: ESOPHAGOGASTRODUODENOSCOPY (EGD); Surgeon: Kathia Henderson MD;  [facility]; Service: Gastroenterology    ESOPHAGOGASTRODUODENOSCOPY N/A 4/19/2018    Procedure: ESOPHAGOGASTRODUODENOSCOPY (EGD); Surgeon: Sherin Santos MD;  [facility]; Service: Gastroenterology    ESOPHAGOGASTRODUODENOSCOPY N/A 5/10/2018    Procedure: ESOPHAGOGASTRODUODENOSCOPY (EGD); Surgeon: Garrick Metcalf MD;  [facility];   Service: Gastroenterology    IR TEMP HD CATH  2/28/2019    KNEE ARTHROSCOPY Bilateral     KNEE ARTHROSCOPY Right 2009    cibishino last assessed 08/24/2016    KNEE ARTHROSCOPY Right 07/01/2019    LIVER SURGERY      NERVE BLOCK Bilateral 5/29/2018    Procedure: SPLANCHNIC NERVE BLOCK;  Surgeon: Gabby Turner MD;  [facility];  Service: Pain Management     PANCREAS SURGERY      stents    PANCREATIC CYST EXCISION      [place] INJECT [address] Bilateral 5/9/2017    Procedure: CELIAC PLEXUS BLOCK ; Surgeon: Eliud Guardado MD;  [facility];  Service: Pain Management     IA INJECT NERV BLCK,CELIAC PLEXUS Bilateral 6/1/2017    Procedure: SPLANCHNIC NERVE BLOCK at T12;  Surgeon: Eliud Guardado MD;  [facility];  Service: Pain Management     IA INJECT NERV BLCK,CELIAC PLEXUS Bilateral 8/8/2017    Procedure: BILATERAL SPLANCHNIC NERVE BLOCK T12;  Surgeon: Eliud Guardado MD;  [facility];  Service: Pain Management     IA INJECT NERV BLCK,CELIAC PLEXUS Bilateral 4/18/2019    Procedure: BLOCK / INJECTION CELIAC PLEXUS;  Surgeon: Eliud Guardado MD;  [facility];  Service: Pain Management     IA INJECT NERV BLCK,CELIAC PLEXUS Bilateral 8/20/2019    Procedure: SPLANCHNIC NERVE BLOCK;  Surgeon: Eliud Guardado MD;  [facility];  Service: Pain Management     IA INJECT NERV BLCK,CELIAC PLEXUS Bilateral 10/17/2019    Procedure: SPLANCHNIC NERVE BLOCK;  Surgeon: Eliud Guardado MD;  [facility];  Service: Pain Management     IA INJECT NERV Erich Dings Bilateral 12/28/2017    Procedure: SPLANCHNIC NERVE BLOCK;  Surgeon: Eliud Guardado MD;  [facility];  Service: Pain Management     IA LAP,CHOLECYSTECTOMY N/A 2/14/2017    Procedure: LAPAROSCOPIC CHOLECYSTECTOMY, IOC, POSSIBLE OPEN ;  Surgeon: Manfred Duong MD;  [facility];  Service: General    ROTATOR CUFF REPAIR Right     SHOULDER ARTHROSCOPY      SHOULDER ARTHROSCOPY Right      Social History   Social History     Substance and Sexual Activity   Alcohol Use Yes    Alcohol/week: 10 0 standard drinks    Types: 10 Cans of beer per week    Drinks per session: 3 or 4    Comment: quit     Social History     Substance and Sexual Activity   Drug Use No     Social History     Tobacco Use   Smoking Status Current Every Day Smoker    Packs/day: 0 50    Years: 20 00    Pack years: 10 00   Smokeless Tobacco Never Used     Family History   Problem Relation Age of Onset    Cirrhosis Mother    Barbara  Heart disease Other         cardiac disorder    Cancer Other        Meds/Allergies       (Not in a hospital admission)    Allergies   Allergen Reactions    Bee Venom Swelling       Objective     BP (!) 176/100   Pulse 89   Temp 99 °F (37 2 °C) (Oral)   Resp 18   SpO2 98%       PHYSICAL EXAM    Gen: NAD  CV: RRR  CHEST: Clear  ABD: soft, NT/ND  EXT: no edema  Neuro: AAO      ASSESSMENT/PLAN:  This is a 50y o  year old male here for EGD and EUS for gastric wall thickening and chronic pancreatitis      PLAN:   Procedure:  EGD/EUS

## 2020-03-20 ENCOUNTER — TELEPHONE (OUTPATIENT)
Dept: GASTROENTEROLOGY | Facility: MEDICAL CENTER | Age: 45
End: 2020-03-20

## 2020-03-20 NOTE — TELEPHONE ENCOUNTER
----- Message from Magen Rm MD sent at 3/20/2020 12:28 PM EDT -----  Please call patient :  Biopsies showed mild inflammation  Continue treatment for acid reflux and gastritis  Follow up with Dr Aysha Hernandez

## 2020-03-27 PROBLEM — R10.84 GENERALIZED ABDOMINAL PAIN: Status: ACTIVE | Noted: 2018-04-18

## 2020-03-27 PROBLEM — R19.4 CHANGE IN BOWEL HABITS: Status: ACTIVE | Noted: 2020-03-27

## 2020-03-31 ENCOUNTER — TELEPHONE (OUTPATIENT)
Dept: GASTROENTEROLOGY | Facility: CLINIC | Age: 45
End: 2020-03-31

## 2020-03-31 NOTE — TELEPHONE ENCOUNTER
----- Message from Ericka Tao MD sent at 3/31/2020 12:36 PM EDT -----   Please put him on for a video visit with richard next week

## 2020-04-06 RX ORDER — SUCRALFATE ORAL 1 G/10ML
1 SUSPENSION ORAL
COMMUNITY
Start: 2020-01-15 | End: 2020-04-22

## 2020-04-06 RX ORDER — LIDOCAINE, MENTHOL 4.8; 1.2 G/120G; G/120G
GEL ORAL
COMMUNITY
End: 2021-09-14

## 2020-04-06 RX ORDER — NIACIN 500 MG/1
500 TABLET, EXTENDED RELEASE ORAL
COMMUNITY
Start: 2020-01-20 | End: 2020-09-15

## 2020-04-07 ENCOUNTER — TELEPHONE (OUTPATIENT)
Dept: GASTROENTEROLOGY | Facility: CLINIC | Age: 45
End: 2020-04-07

## 2020-04-07 ENCOUNTER — TELEMEDICINE (OUTPATIENT)
Dept: GASTROENTEROLOGY | Facility: CLINIC | Age: 45
End: 2020-04-07
Payer: COMMERCIAL

## 2020-04-07 DIAGNOSIS — Z72.0 TOBACCO USE: ICD-10-CM

## 2020-04-07 DIAGNOSIS — R74.01 TRANSAMINITIS: ICD-10-CM

## 2020-04-07 DIAGNOSIS — K29.30 CHRONIC SUPERFICIAL GASTRITIS WITHOUT BLEEDING: Primary | ICD-10-CM

## 2020-04-07 DIAGNOSIS — K86.1 OTHER CHRONIC PANCREATITIS (HCC): ICD-10-CM

## 2020-04-07 DIAGNOSIS — E78.1 HYPERTRIGLYCERIDEMIA: ICD-10-CM

## 2020-04-07 PROCEDURE — 99214 OFFICE O/P EST MOD 30 MIN: CPT | Performed by: PHYSICIAN ASSISTANT

## 2020-04-07 RX ORDER — DEXLANSOPRAZOLE 30 MG/1
30 CAPSULE, DELAYED RELEASE ORAL 2 TIMES DAILY
Qty: 60 CAPSULE | Refills: 2 | Status: SHIPPED | OUTPATIENT
Start: 2020-04-07 | End: 2020-04-07 | Stop reason: CLARIF

## 2020-04-07 RX ORDER — ESOMEPRAZOLE MAGNESIUM 40 MG/1
CAPSULE, DELAYED RELEASE ORAL
Qty: 60 CAPSULE | Refills: 2 | Status: SHIPPED | OUTPATIENT
Start: 2020-04-07 | End: 2020-08-17

## 2020-04-08 ENCOUNTER — TELEPHONE (OUTPATIENT)
Dept: GASTROENTEROLOGY | Facility: CLINIC | Age: 45
End: 2020-04-08

## 2020-04-13 ENCOUNTER — TELEMEDICINE (OUTPATIENT)
Dept: FAMILY MEDICINE CLINIC | Facility: CLINIC | Age: 45
End: 2020-04-13

## 2020-04-13 VITALS
BODY MASS INDEX: 28.7 KG/M2 | WEIGHT: 205 LBS | DIASTOLIC BLOOD PRESSURE: 108 MMHG | TEMPERATURE: 99.7 F | HEIGHT: 71 IN | SYSTOLIC BLOOD PRESSURE: 192 MMHG

## 2020-04-13 DIAGNOSIS — I10 ESSENTIAL HYPERTENSION: Primary | ICD-10-CM

## 2020-04-13 DIAGNOSIS — Z20.828 EXPOSURE TO SARS-ASSOCIATED CORONAVIRUS: ICD-10-CM

## 2020-04-13 PROCEDURE — 87635 SARS-COV-2 COVID-19 AMP PRB: CPT

## 2020-04-13 PROCEDURE — 99213 OFFICE O/P EST LOW 20 MIN: CPT | Performed by: FAMILY MEDICINE

## 2020-04-13 RX ORDER — AMLODIPINE BESYLATE 5 MG/1
5 TABLET ORAL DAILY
Qty: 30 TABLET | Refills: 1 | Status: SHIPPED | OUTPATIENT
Start: 2020-04-13 | End: 2020-04-22

## 2020-04-14 LAB — SARS-COV-2 RNA SPEC QL NAA+PROBE: NOT DETECTED

## 2020-04-15 ENCOUNTER — TELEPHONE (OUTPATIENT)
Dept: FAMILY MEDICINE CLINIC | Facility: CLINIC | Age: 45
End: 2020-04-15

## 2020-04-22 ENCOUNTER — TELEMEDICINE (OUTPATIENT)
Dept: FAMILY MEDICINE CLINIC | Facility: CLINIC | Age: 45
End: 2020-04-22
Payer: COMMERCIAL

## 2020-04-22 VITALS
HEART RATE: 86 BPM | DIASTOLIC BLOOD PRESSURE: 88 MMHG | TEMPERATURE: 98.2 F | WEIGHT: 201 LBS | BODY MASS INDEX: 28.14 KG/M2 | SYSTOLIC BLOOD PRESSURE: 142 MMHG | HEIGHT: 71 IN

## 2020-04-22 DIAGNOSIS — I10 ESSENTIAL HYPERTENSION: ICD-10-CM

## 2020-04-22 PROCEDURE — 99214 OFFICE O/P EST MOD 30 MIN: CPT | Performed by: FAMILY MEDICINE

## 2020-04-22 RX ORDER — AMLODIPINE BESYLATE 10 MG/1
10 TABLET ORAL DAILY
Qty: 30 TABLET | Refills: 1 | Status: SHIPPED | OUTPATIENT
Start: 2020-04-22 | End: 2020-06-15

## 2020-05-06 ENCOUNTER — TELEMEDICINE (OUTPATIENT)
Dept: FAMILY MEDICINE CLINIC | Facility: CLINIC | Age: 45
End: 2020-05-06
Payer: COMMERCIAL

## 2020-05-06 VITALS
BODY MASS INDEX: 27.72 KG/M2 | DIASTOLIC BLOOD PRESSURE: 90 MMHG | SYSTOLIC BLOOD PRESSURE: 143 MMHG | WEIGHT: 198 LBS | TEMPERATURE: 98.1 F | HEIGHT: 71 IN

## 2020-05-06 DIAGNOSIS — I10 ESSENTIAL HYPERTENSION: Primary | ICD-10-CM

## 2020-05-06 DIAGNOSIS — E78.1 HYPERTRIGLYCERIDEMIA: ICD-10-CM

## 2020-05-06 DIAGNOSIS — E78.2 MIXED HYPERLIPIDEMIA: ICD-10-CM

## 2020-05-06 DIAGNOSIS — K86.1 OTHER CHRONIC PANCREATITIS (HCC): ICD-10-CM

## 2020-05-06 PROCEDURE — 99214 OFFICE O/P EST MOD 30 MIN: CPT | Performed by: FAMILY MEDICINE

## 2020-05-06 RX ORDER — LISINOPRIL 10 MG/1
10 TABLET ORAL DAILY
Qty: 90 TABLET | Refills: 3 | Status: SHIPPED | OUTPATIENT
Start: 2020-05-06 | End: 2020-06-01 | Stop reason: SDUPTHER

## 2020-05-06 RX ORDER — FENOFIBRIC ACID 135 MG/1
1 CAPSULE, DELAYED RELEASE ORAL DAILY
Qty: 90 CAPSULE | Refills: 2 | Status: SHIPPED | OUTPATIENT
Start: 2020-05-06 | End: 2021-02-15 | Stop reason: SDUPTHER

## 2020-05-14 ENCOUNTER — APPOINTMENT (EMERGENCY)
Dept: RADIOLOGY | Facility: HOSPITAL | Age: 45
End: 2020-05-14
Payer: COMMERCIAL

## 2020-05-14 ENCOUNTER — HOSPITAL ENCOUNTER (EMERGENCY)
Facility: HOSPITAL | Age: 45
Discharge: HOME | End: 2020-05-14
Attending: EMERGENCY MEDICINE | Admitting: EMERGENCY MEDICINE
Payer: COMMERCIAL

## 2020-05-14 VITALS
BODY MASS INDEX: 28.69 KG/M2 | SYSTOLIC BLOOD PRESSURE: 140 MMHG | WEIGHT: 205.69 LBS | HEART RATE: 75 BPM | TEMPERATURE: 98.3 F | RESPIRATION RATE: 22 BRPM | DIASTOLIC BLOOD PRESSURE: 80 MMHG | OXYGEN SATURATION: 97 %

## 2020-05-14 DIAGNOSIS — T14.90XA INHALATION INJURY: ICD-10-CM

## 2020-05-14 DIAGNOSIS — J45.909 ACUTE ASTHMATIC BRONCHITIS: Primary | ICD-10-CM

## 2020-05-14 DIAGNOSIS — R07.9 CHEST PAIN: ICD-10-CM

## 2020-05-14 LAB
ANION GAP SERPL CALCULATED.3IONS-SCNC: 13 MMOL/L (ref 4–13)
APTT PPP: 36 SECONDS (ref 23–37)
ATRIAL RATE: 77 BPM
ATRIAL RATE: 85 BPM
BASOPHILS # BLD AUTO: 0.04 THOUSANDS/ΜL (ref 0–0.1)
BASOPHILS NFR BLD AUTO: 1 % (ref 0–1)
BUN SERPL-MCNC: 13 MG/DL (ref 5–25)
CALCIUM SERPL-MCNC: 8.4 MG/DL (ref 8.3–10.1)
CHLORIDE SERPL-SCNC: 106 MMOL/L (ref 100–108)
CO2 SERPL-SCNC: 24 MMOL/L (ref 21–32)
CREAT SERPL-MCNC: 0.73 MG/DL (ref 0.6–1.3)
D DIMER PPP FEU-MCNC: <0.27 UG/ML FEU
EOSINOPHIL # BLD AUTO: 0.06 THOUSAND/ΜL (ref 0–0.61)
EOSINOPHIL NFR BLD AUTO: 1 % (ref 0–6)
ERYTHROCYTE [DISTWIDTH] IN BLOOD BY AUTOMATED COUNT: 13.1 % (ref 11.6–15.1)
GFR SERPL CREATININE-BSD FRML MDRD: 110 ML/MIN/1.73SQ M
GLUCOSE SERPL-MCNC: 125 MG/DL (ref 65–140)
HCT VFR BLD AUTO: 39.1 % (ref 36.5–49.3)
HGB BLD-MCNC: 13.3 G/DL (ref 12–17)
IMM GRANULOCYTES # BLD AUTO: 0.02 THOUSAND/UL (ref 0–0.2)
IMM GRANULOCYTES NFR BLD AUTO: 0 % (ref 0–2)
INR PPP: 1.27 (ref 0.84–1.19)
LYMPHOCYTES # BLD AUTO: 2.21 THOUSANDS/ΜL (ref 0.6–4.47)
LYMPHOCYTES NFR BLD AUTO: 39 % (ref 14–44)
MAGNESIUM SERPL-MCNC: 1.9 MG/DL (ref 1.6–2.6)
MCH RBC QN AUTO: 33.5 PG (ref 26.8–34.3)
MCHC RBC AUTO-ENTMCNC: 34 G/DL (ref 31.4–37.4)
MCV RBC AUTO: 99 FL (ref 82–98)
MONOCYTES # BLD AUTO: 0.58 THOUSAND/ΜL (ref 0.17–1.22)
MONOCYTES NFR BLD AUTO: 10 % (ref 4–12)
NEUTROPHILS # BLD AUTO: 2.81 THOUSANDS/ΜL (ref 1.85–7.62)
NEUTS SEG NFR BLD AUTO: 49 % (ref 43–75)
NRBC BLD AUTO-RTO: 0 /100 WBCS
P AXIS: 60 DEGREES
P AXIS: 60 DEGREES
PLATELET # BLD AUTO: 108 THOUSANDS/UL (ref 149–390)
PMV BLD AUTO: 11 FL (ref 8.9–12.7)
POTASSIUM SERPL-SCNC: 3.6 MMOL/L (ref 3.5–5.3)
PR INTERVAL: 160 MS
PR INTERVAL: 162 MS
PROTHROMBIN TIME: 16 SECONDS (ref 11.6–14.5)
QRS AXIS: 58 DEGREES
QRS AXIS: 63 DEGREES
QRSD INTERVAL: 120 MS
QRSD INTERVAL: 126 MS
QT INTERVAL: 392 MS
QT INTERVAL: 408 MS
QTC INTERVAL: 461 MS
QTC INTERVAL: 466 MS
RBC # BLD AUTO: 3.97 MILLION/UL (ref 3.88–5.62)
SARS-COV-2 RNA RESP QL NAA+PROBE: NEGATIVE
SODIUM SERPL-SCNC: 143 MMOL/L (ref 136–145)
T WAVE AXIS: 55 DEGREES
T WAVE AXIS: 62 DEGREES
TROPONIN I SERPL-MCNC: <0.02 NG/ML
TROPONIN I SERPL-MCNC: <0.02 NG/ML
TSH SERPL DL<=0.05 MIU/L-ACNC: 1.14 UIU/ML (ref 0.36–3.74)
VENTRICULAR RATE: 77 BPM
VENTRICULAR RATE: 85 BPM
WBC # BLD AUTO: 5.72 THOUSAND/UL (ref 4.31–10.16)

## 2020-05-14 PROCEDURE — 96361 HYDRATE IV INFUSION ADD-ON: CPT

## 2020-05-14 PROCEDURE — 85610 PROTHROMBIN TIME: CPT | Performed by: PHYSICIAN ASSISTANT

## 2020-05-14 PROCEDURE — 36415 COLL VENOUS BLD VENIPUNCTURE: CPT | Performed by: PHYSICIAN ASSISTANT

## 2020-05-14 PROCEDURE — 99285 EMERGENCY DEPT VISIT HI MDM: CPT | Performed by: PHYSICIAN ASSISTANT

## 2020-05-14 PROCEDURE — 87635 SARS-COV-2 COVID-19 AMP PRB: CPT | Performed by: PHYSICIAN ASSISTANT

## 2020-05-14 PROCEDURE — 84443 ASSAY THYROID STIM HORMONE: CPT | Performed by: PHYSICIAN ASSISTANT

## 2020-05-14 PROCEDURE — 80048 BASIC METABOLIC PNL TOTAL CA: CPT | Performed by: PHYSICIAN ASSISTANT

## 2020-05-14 PROCEDURE — 85025 COMPLETE CBC W/AUTO DIFF WBC: CPT | Performed by: PHYSICIAN ASSISTANT

## 2020-05-14 PROCEDURE — 71045 X-RAY EXAM CHEST 1 VIEW: CPT

## 2020-05-14 PROCEDURE — 84484 ASSAY OF TROPONIN QUANT: CPT | Performed by: PHYSICIAN ASSISTANT

## 2020-05-14 PROCEDURE — 99285 EMERGENCY DEPT VISIT HI MDM: CPT

## 2020-05-14 PROCEDURE — 93010 ELECTROCARDIOGRAM REPORT: CPT | Performed by: INTERNAL MEDICINE

## 2020-05-14 PROCEDURE — 83735 ASSAY OF MAGNESIUM: CPT | Performed by: PHYSICIAN ASSISTANT

## 2020-05-14 PROCEDURE — 85379 FIBRIN DEGRADATION QUANT: CPT | Performed by: PHYSICIAN ASSISTANT

## 2020-05-14 PROCEDURE — 85730 THROMBOPLASTIN TIME PARTIAL: CPT | Performed by: PHYSICIAN ASSISTANT

## 2020-05-14 PROCEDURE — 96360 HYDRATION IV INFUSION INIT: CPT

## 2020-05-14 PROCEDURE — 93005 ELECTROCARDIOGRAM TRACING: CPT

## 2020-05-14 RX ORDER — ALBUTEROL SULFATE 90 UG/1
2 AEROSOL, METERED RESPIRATORY (INHALATION) ONCE
Status: COMPLETED | OUTPATIENT
Start: 2020-05-14 | End: 2020-05-14

## 2020-05-14 RX ORDER — ALBUTEROL SULFATE 90 UG/1
2 AEROSOL, METERED RESPIRATORY (INHALATION) EVERY 4 HOURS PRN
Qty: 1 INHALER | Refills: 0 | Status: SHIPPED | OUTPATIENT
Start: 2020-05-14 | End: 2020-09-15

## 2020-05-14 RX ORDER — PREDNISONE 50 MG/1
50 TABLET ORAL DAILY
Qty: 4 TABLET | Refills: 0 | Status: SHIPPED | OUTPATIENT
Start: 2020-05-14 | End: 2020-05-18 | Stop reason: ALTCHOICE

## 2020-05-14 RX ORDER — SODIUM CHLORIDE 9 MG/ML
125 INJECTION, SOLUTION INTRAVENOUS CONTINUOUS
Status: DISCONTINUED | OUTPATIENT
Start: 2020-05-14 | End: 2020-05-14 | Stop reason: HOSPADM

## 2020-05-14 RX ADMIN — SODIUM CHLORIDE 125 ML/HR: 0.9 INJECTION, SOLUTION INTRAVENOUS at 17:04

## 2020-05-14 RX ADMIN — ALBUTEROL SULFATE 2 PUFF: 90 AEROSOL, METERED RESPIRATORY (INHALATION) at 16:55

## 2020-05-14 RX ADMIN — PREDNISONE 50 MG: 20 TABLET ORAL at 19:59

## 2020-05-15 LAB
ATRIAL RATE: 90 BPM
P AXIS: 64 DEGREES
PR INTERVAL: 158 MS
QRS AXIS: 65 DEGREES
QRSD INTERVAL: 120 MS
QT INTERVAL: 390 MS
QTC INTERVAL: 477 MS
T WAVE AXIS: 62 DEGREES
VENTRICULAR RATE: 90 BPM

## 2020-05-15 PROCEDURE — 93010 ELECTROCARDIOGRAM REPORT: CPT | Performed by: INTERNAL MEDICINE

## 2020-05-18 ENCOUNTER — TELEMEDICINE (OUTPATIENT)
Dept: FAMILY MEDICINE CLINIC | Facility: CLINIC | Age: 45
End: 2020-05-18
Payer: COMMERCIAL

## 2020-05-18 VITALS
WEIGHT: 205 LBS | DIASTOLIC BLOOD PRESSURE: 78 MMHG | TEMPERATURE: 98.1 F | HEIGHT: 71 IN | BODY MASS INDEX: 28.7 KG/M2 | SYSTOLIC BLOOD PRESSURE: 128 MMHG

## 2020-05-18 DIAGNOSIS — E78.1 HYPERTRIGLYCERIDEMIA: Primary | ICD-10-CM

## 2020-05-18 DIAGNOSIS — J45.909 UNCOMPLICATED ASTHMA, UNSPECIFIED ASTHMA SEVERITY, UNSPECIFIED WHETHER PERSISTENT: ICD-10-CM

## 2020-05-18 DIAGNOSIS — I10 ESSENTIAL HYPERTENSION: ICD-10-CM

## 2020-05-18 PROCEDURE — 99214 OFFICE O/P EST MOD 30 MIN: CPT | Performed by: FAMILY MEDICINE

## 2020-05-28 DIAGNOSIS — G47.00 INSOMNIA, UNSPECIFIED TYPE: ICD-10-CM

## 2020-05-28 RX ORDER — ONDANSETRON 4 MG/1
TABLET, ORALLY DISINTEGRATING ORAL
Qty: 30 TABLET | Refills: 0 | Status: SHIPPED | OUTPATIENT
Start: 2020-05-28 | End: 2020-07-01

## 2020-06-01 DIAGNOSIS — I10 ESSENTIAL HYPERTENSION: ICD-10-CM

## 2020-06-01 RX ORDER — LISINOPRIL 20 MG/1
20 TABLET ORAL DAILY
Qty: 30 TABLET | Refills: 3 | Status: SHIPPED | OUTPATIENT
Start: 2020-06-01 | End: 2020-09-25

## 2020-06-08 DIAGNOSIS — G89.4 CHRONIC PAIN SYNDROME: ICD-10-CM

## 2020-06-08 RX ORDER — PREGABALIN 100 MG/1
CAPSULE ORAL
Qty: 90 CAPSULE | Refills: 0 | Status: SHIPPED | OUTPATIENT
Start: 2020-06-08 | End: 2020-07-27

## 2020-06-12 DIAGNOSIS — E78.2 MIXED HYPERLIPIDEMIA: ICD-10-CM

## 2020-06-15 ENCOUNTER — OFFICE VISIT (OUTPATIENT)
Dept: FAMILY MEDICINE CLINIC | Facility: CLINIC | Age: 45
End: 2020-06-15
Payer: COMMERCIAL

## 2020-06-15 VITALS
TEMPERATURE: 96.6 F | SYSTOLIC BLOOD PRESSURE: 122 MMHG | BODY MASS INDEX: 29.03 KG/M2 | OXYGEN SATURATION: 97 % | HEART RATE: 88 BPM | DIASTOLIC BLOOD PRESSURE: 78 MMHG | HEIGHT: 71 IN | WEIGHT: 207.4 LBS

## 2020-06-15 DIAGNOSIS — K86.1 OTHER CHRONIC PANCREATITIS (HCC): ICD-10-CM

## 2020-06-15 DIAGNOSIS — I10 ESSENTIAL HYPERTENSION: ICD-10-CM

## 2020-06-15 DIAGNOSIS — E78.1 HYPERTRIGLYCERIDEMIA: ICD-10-CM

## 2020-06-15 DIAGNOSIS — F40.243 FEAR OF FLYING: Primary | ICD-10-CM

## 2020-06-15 PROCEDURE — 3078F DIAST BP <80 MM HG: CPT | Performed by: FAMILY MEDICINE

## 2020-06-15 PROCEDURE — 3008F BODY MASS INDEX DOCD: CPT | Performed by: FAMILY MEDICINE

## 2020-06-15 PROCEDURE — 99214 OFFICE O/P EST MOD 30 MIN: CPT | Performed by: FAMILY MEDICINE

## 2020-06-15 PROCEDURE — 4004F PT TOBACCO SCREEN RCVD TLK: CPT | Performed by: FAMILY MEDICINE

## 2020-06-15 PROCEDURE — 3074F SYST BP LT 130 MM HG: CPT | Performed by: FAMILY MEDICINE

## 2020-06-15 RX ORDER — ROSUVASTATIN CALCIUM 40 MG/1
TABLET, COATED ORAL
Qty: 90 TABLET | Refills: 0 | Status: SHIPPED | OUTPATIENT
Start: 2020-06-15 | End: 2020-09-21

## 2020-06-15 RX ORDER — AMLODIPINE BESYLATE 5 MG/1
5 TABLET ORAL DAILY
Qty: 30 TABLET | Refills: 3 | Status: SHIPPED | OUTPATIENT
Start: 2020-06-15 | End: 2020-10-21

## 2020-06-15 RX ORDER — LORAZEPAM 0.5 MG/1
0.5 TABLET ORAL DAILY PRN
Qty: 14 TABLET | Refills: 0 | Status: SHIPPED | OUTPATIENT
Start: 2020-06-15 | End: 2020-09-15

## 2020-06-21 DIAGNOSIS — G47.00 INSOMNIA, UNSPECIFIED TYPE: ICD-10-CM

## 2020-06-21 DIAGNOSIS — Z91.030 BEE STING ALLERGY: Primary | ICD-10-CM

## 2020-06-23 RX ORDER — EPINEPHRINE 0.3 MG/.3ML
INJECTION INTRAMUSCULAR
Qty: 2 EACH | Refills: 0 | OUTPATIENT
Start: 2020-06-23 | End: 2021-09-14

## 2020-06-23 RX ORDER — TRAZODONE HYDROCHLORIDE 50 MG/1
TABLET ORAL
Qty: 30 TABLET | Refills: 0 | Status: SHIPPED | OUTPATIENT
Start: 2020-06-23 | End: 2021-01-13

## 2020-06-30 DIAGNOSIS — E78.2 MIXED HYPERLIPIDEMIA: ICD-10-CM

## 2020-07-01 DIAGNOSIS — G47.00 INSOMNIA, UNSPECIFIED TYPE: ICD-10-CM

## 2020-07-01 RX ORDER — ONDANSETRON 4 MG/1
TABLET, ORALLY DISINTEGRATING ORAL
Qty: 30 TABLET | Refills: 0 | Status: SHIPPED | OUTPATIENT
Start: 2020-07-01 | End: 2020-08-17

## 2020-07-01 RX ORDER — OMEGA-3-ACID ETHYL ESTERS 1 G/1
CAPSULE, LIQUID FILLED ORAL
Qty: 360 CAPSULE | Refills: 0 | Status: SHIPPED | OUTPATIENT
Start: 2020-07-01 | End: 2020-10-09

## 2020-07-27 DIAGNOSIS — G89.4 CHRONIC PAIN SYNDROME: ICD-10-CM

## 2020-07-27 RX ORDER — PREGABALIN 100 MG/1
CAPSULE ORAL
Qty: 90 CAPSULE | Refills: 0 | Status: SHIPPED | OUTPATIENT
Start: 2020-07-27 | End: 2020-09-15 | Stop reason: SDUPTHER

## 2020-08-15 DIAGNOSIS — G47.00 INSOMNIA, UNSPECIFIED TYPE: ICD-10-CM

## 2020-08-15 DIAGNOSIS — K29.30 CHRONIC SUPERFICIAL GASTRITIS WITHOUT BLEEDING: ICD-10-CM

## 2020-08-17 RX ORDER — ONDANSETRON 4 MG/1
TABLET, ORALLY DISINTEGRATING ORAL
Qty: 30 TABLET | Refills: 0 | Status: SHIPPED | OUTPATIENT
Start: 2020-08-17 | End: 2020-11-09

## 2020-08-17 RX ORDER — ESOMEPRAZOLE MAGNESIUM 40 MG/1
40 CAPSULE, DELAYED RELEASE ORAL
Qty: 60 CAPSULE | Refills: 5 | Status: SHIPPED | OUTPATIENT
Start: 2020-08-17 | End: 2021-08-18

## 2020-08-24 ENCOUNTER — HOSPITAL ENCOUNTER (EMERGENCY)
Facility: HOSPITAL | Age: 45
Discharge: HOME | End: 2020-08-24
Attending: EMERGENCY MEDICINE | Admitting: EMERGENCY MEDICINE
Payer: COMMERCIAL

## 2020-08-24 ENCOUNTER — APPOINTMENT (EMERGENCY)
Dept: CT IMAGING | Facility: HOSPITAL | Age: 45
End: 2020-08-24
Payer: COMMERCIAL

## 2020-08-24 VITALS
BODY MASS INDEX: 27.2 KG/M2 | OXYGEN SATURATION: 97 % | RESPIRATION RATE: 20 BRPM | DIASTOLIC BLOOD PRESSURE: 87 MMHG | SYSTOLIC BLOOD PRESSURE: 131 MMHG | HEART RATE: 67 BPM | WEIGHT: 195 LBS | TEMPERATURE: 98.3 F

## 2020-08-24 DIAGNOSIS — J45.909 UNCOMPLICATED ASTHMA, UNSPECIFIED ASTHMA SEVERITY, UNSPECIFIED WHETHER PERSISTENT: ICD-10-CM

## 2020-08-24 DIAGNOSIS — R10.13 EPIGASTRIC PAIN: ICD-10-CM

## 2020-08-24 DIAGNOSIS — R10.32 LEFT LOWER QUADRANT ABDOMINAL PAIN: ICD-10-CM

## 2020-08-24 DIAGNOSIS — R10.9 ABDOMINAL PAIN, UNSPECIFIED ABDOMINAL LOCATION: ICD-10-CM

## 2020-08-24 DIAGNOSIS — K52.9 COLITIS: Primary | ICD-10-CM

## 2020-08-24 LAB
ALBUMIN SERPL BCP-MCNC: 3.5 G/DL (ref 3.5–5)
ALP SERPL-CCNC: 135 U/L (ref 46–116)
ALT SERPL W P-5'-P-CCNC: 66 U/L (ref 12–78)
ANION GAP SERPL CALCULATED.3IONS-SCNC: 7 MMOL/L (ref 4–13)
AST SERPL W P-5'-P-CCNC: 85 U/L (ref 5–45)
BASOPHILS # BLD AUTO: 0.01 THOUSANDS/ΜL (ref 0–0.1)
BASOPHILS NFR BLD AUTO: 0 % (ref 0–1)
BILIRUB SERPL-MCNC: 0.7 MG/DL (ref 0.2–1)
BUN SERPL-MCNC: 11 MG/DL (ref 5–25)
CALCIUM SERPL-MCNC: 8.3 MG/DL (ref 8.3–10.1)
CHLORIDE SERPL-SCNC: 107 MMOL/L (ref 100–108)
CO2 SERPL-SCNC: 28 MMOL/L (ref 21–32)
CREAT SERPL-MCNC: 0.65 MG/DL (ref 0.6–1.3)
EOSINOPHIL # BLD AUTO: 0.05 THOUSAND/ΜL (ref 0–0.61)
EOSINOPHIL NFR BLD AUTO: 1 % (ref 0–6)
ERYTHROCYTE [DISTWIDTH] IN BLOOD BY AUTOMATED COUNT: 13.6 % (ref 11.6–15.1)
GFR SERPL CREATININE-BSD FRML MDRD: 116 ML/MIN/1.73SQ M
GLUCOSE SERPL-MCNC: 90 MG/DL (ref 65–140)
HCT VFR BLD AUTO: 34.8 % (ref 36.5–49.3)
HGB BLD-MCNC: 11.5 G/DL (ref 12–17)
IMM GRANULOCYTES # BLD AUTO: 0.02 THOUSAND/UL (ref 0–0.2)
IMM GRANULOCYTES NFR BLD AUTO: 0 % (ref 0–2)
LIPASE SERPL-CCNC: 166 U/L (ref 73–393)
LYMPHOCYTES # BLD AUTO: 1.71 THOUSANDS/ΜL (ref 0.6–4.47)
LYMPHOCYTES NFR BLD AUTO: 37 % (ref 14–44)
MCH RBC QN AUTO: 33.5 PG (ref 26.8–34.3)
MCHC RBC AUTO-ENTMCNC: 33 G/DL (ref 31.4–37.4)
MCV RBC AUTO: 102 FL (ref 82–98)
MONOCYTES # BLD AUTO: 0.38 THOUSAND/ΜL (ref 0.17–1.22)
MONOCYTES NFR BLD AUTO: 8 % (ref 4–12)
NEUTROPHILS # BLD AUTO: 2.51 THOUSANDS/ΜL (ref 1.85–7.62)
NEUTS SEG NFR BLD AUTO: 54 % (ref 43–75)
NRBC BLD AUTO-RTO: 0 /100 WBCS
PLATELET # BLD AUTO: 87 THOUSANDS/UL (ref 149–390)
PMV BLD AUTO: 11.2 FL (ref 8.9–12.7)
POTASSIUM SERPL-SCNC: 3.9 MMOL/L (ref 3.5–5.3)
PROT SERPL-MCNC: 6.5 G/DL (ref 6.4–8.2)
RBC # BLD AUTO: 3.43 MILLION/UL (ref 3.88–5.62)
SODIUM SERPL-SCNC: 142 MMOL/L (ref 136–145)
WBC # BLD AUTO: 4.68 THOUSAND/UL (ref 4.31–10.16)

## 2020-08-24 PROCEDURE — 83690 ASSAY OF LIPASE: CPT | Performed by: EMERGENCY MEDICINE

## 2020-08-24 PROCEDURE — 74177 CT ABD & PELVIS W/CONTRAST: CPT

## 2020-08-24 PROCEDURE — 80053 COMPREHEN METABOLIC PANEL: CPT | Performed by: EMERGENCY MEDICINE

## 2020-08-24 PROCEDURE — 99284 EMERGENCY DEPT VISIT MOD MDM: CPT

## 2020-08-24 PROCEDURE — 85025 COMPLETE CBC W/AUTO DIFF WBC: CPT | Performed by: EMERGENCY MEDICINE

## 2020-08-24 PROCEDURE — 99285 EMERGENCY DEPT VISIT HI MDM: CPT | Performed by: EMERGENCY MEDICINE

## 2020-08-24 PROCEDURE — 36415 COLL VENOUS BLD VENIPUNCTURE: CPT | Performed by: EMERGENCY MEDICINE

## 2020-08-24 PROCEDURE — G1004 CDSM NDSC: HCPCS

## 2020-08-24 PROCEDURE — 96374 THER/PROPH/DIAG INJ IV PUSH: CPT

## 2020-08-24 PROCEDURE — 96375 TX/PRO/DX INJ NEW DRUG ADDON: CPT

## 2020-08-24 RX ORDER — MAGNESIUM HYDROXIDE/ALUMINUM HYDROXICE/SIMETHICONE 120; 1200; 1200 MG/30ML; MG/30ML; MG/30ML
30 SUSPENSION ORAL ONCE
Status: COMPLETED | OUTPATIENT
Start: 2020-08-24 | End: 2020-08-24

## 2020-08-24 RX ORDER — METRONIDAZOLE 500 MG/1
500 TABLET ORAL EVERY 8 HOURS SCHEDULED
Qty: 21 TABLET | Refills: 0 | Status: SHIPPED | OUTPATIENT
Start: 2020-08-24 | End: 2020-08-31

## 2020-08-24 RX ORDER — CIPROFLOXACIN 500 MG/1
500 TABLET, FILM COATED ORAL 2 TIMES DAILY
Qty: 14 TABLET | Refills: 0 | Status: SHIPPED | OUTPATIENT
Start: 2020-08-24 | End: 2020-08-31

## 2020-08-24 RX ORDER — MORPHINE SULFATE 4 MG/ML
4 INJECTION, SOLUTION INTRAMUSCULAR; INTRAVENOUS ONCE
Status: COMPLETED | OUTPATIENT
Start: 2020-08-24 | End: 2020-08-24

## 2020-08-24 RX ORDER — ONDANSETRON 2 MG/ML
4 INJECTION INTRAMUSCULAR; INTRAVENOUS ONCE
Status: COMPLETED | OUTPATIENT
Start: 2020-08-24 | End: 2020-08-24

## 2020-08-24 RX ORDER — CIPROFLOXACIN 500 MG/1
500 TABLET, FILM COATED ORAL ONCE
Status: COMPLETED | OUTPATIENT
Start: 2020-08-24 | End: 2020-08-24

## 2020-08-24 RX ORDER — METRONIDAZOLE 500 MG/1
500 TABLET ORAL ONCE
Status: COMPLETED | OUTPATIENT
Start: 2020-08-24 | End: 2020-08-24

## 2020-08-24 RX ORDER — LIDOCAINE HYDROCHLORIDE 20 MG/ML
15 SOLUTION OROPHARYNGEAL ONCE
Status: COMPLETED | OUTPATIENT
Start: 2020-08-24 | End: 2020-08-24

## 2020-08-24 RX ORDER — OMEPRAZOLE 20 MG/1
20 CAPSULE, DELAYED RELEASE ORAL DAILY
COMMUNITY
End: 2021-09-16 | Stop reason: ALTCHOICE

## 2020-08-24 RX ADMIN — LIDOCAINE HYDROCHLORIDE 15 ML: 20 SOLUTION ORAL; TOPICAL at 19:03

## 2020-08-24 RX ADMIN — CIPROFLOXACIN HYDROCHLORIDE 500 MG: 500 TABLET, FILM COATED ORAL at 21:52

## 2020-08-24 RX ADMIN — METRONIDAZOLE 500 MG: 500 TABLET, FILM COATED ORAL at 21:52

## 2020-08-24 RX ADMIN — ONDANSETRON 4 MG: 2 INJECTION INTRAMUSCULAR; INTRAVENOUS at 17:36

## 2020-08-24 RX ADMIN — ALUMINUM HYDROXIDE, MAGNESIUM HYDROXIDE, AND SIMETHICONE 30 ML: 200; 200; 20 SUSPENSION ORAL at 19:03

## 2020-08-24 RX ADMIN — IOHEXOL 100 ML: 350 INJECTION, SOLUTION INTRAVENOUS at 20:00

## 2020-08-24 RX ADMIN — MORPHINE SULFATE 4 MG: 4 INJECTION INTRAVENOUS at 17:36

## 2020-08-24 NOTE — ED PROVIDER NOTES
Pt Name: Dale Chacon  MRN: [id number]  [place] 1975  Age/Sex: 50 y o  male  Date of evaluation: 8/24/2020  PCP: Daniel Martin, [address] [place] [address]    Chief Complaint   Patient presents with    Abdominal Pain     Patient presents to ED with co "abdominal pain around my pancreas x 1 week " Patient reports nausea  HPI    50 y o  male presenting with abdominal pain  States he has had abdominal pain for the past week  Then it resolved for couple days however over the last 2-3 days as return  Points primarily towards his epigastric/right upper quadrant region and left lower quadrant region  States has a history of gastritis and pancreatitis  Also has history of cholecystectomy  Reports nausea, no vomiting  States he does not take anything for pain  His last alcoholic beverage was 1/2 weeks ago  Mentions that his pancreatitis was due to hypertriglyceridemia  No trauma to the area  Denies urinary symptoms  States over the past 2 days he has had some watery diarrhea  No blood in stool  No fevers or chills, no sick contacts  Past Medical and Surgical History    Past Medical History:   Diagnosis Date    Asthma     Chronic pain disorder     GERD (gastroesophageal reflux disease)     Hyperlipidemia     Liver abscess     Meniscus tear     left knee work injury last assessed 08/24/2016    Pancreatitis     Pneumonia        Past Surgical History:   Procedure Laterality Date    CELIAC PLEXUS BLOCK Bilateral 10/29/2018    Procedure: SPLANCHNIC NERVE BLOCK;  Surgeon: Thais Mckeon MD;  [facility];  Service: Pain Management     CELIAC PLEXUS BLOCK Bilateral 1/10/2019    Procedure: SPLANCHNIC NERVE BLOCK;  Surgeon: Thais Mckeon MD;  [facility];  Service: Pain Management     CHOLECYSTECTOMY      ESOPHAGOGASTRODUODENOSCOPY N/A 11/16/2017    Procedure: ESOPHAGOGASTRODUODENOSCOPY (EGD); Surgeon: Elisabeth Fuentes MD;  [facility];   Service: Gastroenterology    ESOPHAGOGASTRODUODENOSCOPY N/A 4/19/2018    Procedure: ESOPHAGOGASTRODUODENOSCOPY (EGD); Surgeon: Tres Sung MD;  [facility]; Service: Gastroenterology    ESOPHAGOGASTRODUODENOSCOPY N/A 5/10/2018    Procedure: ESOPHAGOGASTRODUODENOSCOPY (EGD); Surgeon: Pham Green MD;  [facility];   Service: Gastroenterology    IR TEMPORARY DIALYSIS CATHETER PLACEMENT  2/28/2019    KNEE ARTHROSCOPY Bilateral     KNEE ARTHROSCOPY Right 2009    cibishino last assessed 08/24/2016    KNEE ARTHROSCOPY Right 07/01/2019    LIVER SURGERY      NERVE BLOCK Bilateral 5/29/2018    Procedure: SPLANCHNIC NERVE BLOCK;  Surgeon: Sophie Lombardi MD;  [facility];  Service: Pain Management     PANCREAS SURGERY      stents    PANCREATIC CYST EXCISION      [place] INJECT NERV BLCK,CELIAC PLEXUS Bilateral 5/9/2017    Procedure: CELIAC PLEXUS BLOCK ;  Surgeon: Sophie Lombardi MD;  [facility];  Service: Pain Management     [place] INJECT NERV BLCK,CELIAC PLEXUS Bilateral 6/1/2017    Procedure: SPLANCHNIC NERVE BLOCK at T12;  Surgeon: Sophie Lombardi MD;  [facility];  Service: Pain Management     [place] INJECT NERV BLCK,CELIAC PLEXUS Bilateral 8/8/2017    Procedure: BILATERAL SPLANCHNIC NERVE BLOCK T12;  Surgeon: Sophie Lombardi MD;  [facility];  Service: Pain Management     [place] INJECT NERV BLCK,CELIAC PLEXUS Bilateral 4/18/2019    Procedure: BLOCK / INJECTION CELIAC PLEXUS;  Surgeon: Sophie Lombardi MD;  [facility];  Service: Pain Management     [place] INJECT NERV BLCK,CELIAC PLEXUS Bilateral 8/20/2019    Procedure: SPLANCHNIC NERVE BLOCK;  Surgeon: Sophie Lombardi MD;  [facility];  Service: Pain Management     [place] INJECT NERV BLCK,CELIAC PLEXUS Bilateral 10/17/2019    Procedure: SPLANCHNIC NERVE BLOCK;  Surgeon: Sophie Lombardi MD;  [facility];  Service: Pain Management     [place] INJECT NERV BLCK,PARAVERT SYMPATH Bilateral 12/28/2017    Procedure: SPLANCHNIC NERVE BLOCK;  Surgeon: Rafi Silverman MD;  [facility];  Service: Pain Management     MD LAP,CHOLECYSTECTOMY N/A 2/14/2017    Procedure: LAPAROSCOPIC CHOLECYSTECTOMY, IOC, POSSIBLE OPEN ;  Surgeon: Chance Cisneros MD;  [facility];  Service: General    ROTATOR CUFF REPAIR Right     SHOULDER ARTHROSCOPY      SHOULDER ARTHROSCOPY Right        Family History   Problem Relation Age of Onset    Cirrhosis Mother     Heart disease Other         cardiac disorder    Cancer Other        Social History     Tobacco Use    Smoking status: Current Every Day Smoker     Packs/day: 0 50     Years: 20 00     Pack years: 10 00    Smokeless tobacco: Never Used   Substance Use Topics    Alcohol use: Yes     Alcohol/week: 10 0 standard drinks     Types: 10 Cans of beer per week     Drinks per session: 3 or 4     Comment: quit    Drug use: No           Allergies    Allergies   Allergen Reactions    Bee Venom Swelling       Home Medications    Prior to Admission medications    Medication Sig Start Date End Date Taking?  Authorizing Provider   albuterol (PROVENTIL HFA,VENTOLIN HFA) 90 mcg/act inhaler Inhale 2 puffs every 4 (four) hours as needed for wheezing 5/14/20   Wanda Manzo PA-C   albuterol (VENTOLIN HFA) 90 mcg/act inhaler Inhale 2 puffs every 6 (six) hours as needed for wheezing Prn 11/14/19 11/13/20  Bo Estrin, CRNP   amLODIPine (NORVASC) 5 mg tablet Take 1 tablet (5 mg total) by mouth daily 6/15/20   Bo Estrin, CRNP   Choline Fenofibrate (FENOFIBRIC ACID) 135 MG CPDR Take 1 capsule (135 mg total) by mouth daily 5/6/20   Bo Estrin, CRNP   diclofenac sodium (VOLTAREN) 1 % diclofenac 1 % topical gel   APPLY 2 GRAMS TO THE AFFECTED AREA(S) BY TOPICAL ROUTE 4 TIMES PER DAY    Historical Provider, MD   EPIPEN 2-ARIEL 0 3 MG/0 3ML SOAJ Inject one syringe (0 3mg) intramuscularly once as directed 6/23/20   Bo Estrin, CRNP   esomeprazole (NexIUM) 40 MG capsule Take 1 capsule (40 mg total) by mouth 2 (two) times a day before meals 8/17/20   Dinora Mensah PA-C   famotidine (PEPCID) 40 MG tablet Take 40 mg by mouth 2 (two) times a day    Historical Provider, MD   Lidocaine-Menthol 4-1 % GEL NuLido 4 %-1 % topical gel   Apply to affected area 2-4 times a day as needed for pain    Historical Provider, MD   lisinopril (ZESTRIL) 20 mg tablet Take 1 tablet (20 mg total) by mouth daily 6/1/20   LUANA Gaitan   LORazepam (ATIVAN) 0 5 mg tablet Take 1 tablet (0 5 mg total) by mouth daily as needed for anxiety 6/15/20   LUANA Gaitan   niacin (NIASPAN) 500 mg CR tablet Take 500 mg by mouth 1/20/20 1/19/21  Historical Provider, MD   omega-3-acid ethyl esters (LOVAZA) 1 g capsule TAKE TWO CAPSULES BY MOUTH TWICE DAILY  7/1/20   LUNAA Gaitan   ondansetron (ZOFRAN-ODT) 4 mg disintegrating tablet DISSOLVE ONE TABLET IN MOUTH EVERY EIGHT HOURS AS NEEDED NAUSEA AND VOMITING  8/17/20   LUANA Gaitan   pancrelipase, Lip-Prot-Amyl, (CREON) 12,000 units capsule Take 12,000 units of lipase by mouth as needed for snacks 4/4/19   Dinora Mensah PA-C   Pancrelipase, Lip-Prot-Amyl, (Creon) 25066 units CPEP Take by mouth    Historical Provider, MD   pantoprazole (PROTONIX) 40 mg tablet Take 1 tablet (40 mg total) by mouth 2 (two) times a day 1/6/20   LUANA Gaitan   pregabalin (LYRICA) 100 mg capsule TAKE ONE CAPSULE BY MOUTH THREE TIMES DAILY  7/27/20   Sherron Crespo MD   rosuvastatin (CRESTOR) 40 MG tablet TAKE ONE TABLET BY MOUTH EVERY DAY  6/15/20   LUANA Gaitan   traZODone (DESYREL) 50 mg tablet TAKE ONE TABLET BY MOUTH NIGHTLY AT BEDTIME  6/23/20   LUANA Gaitan           Review of Systems    Review of Systems   Constitutional: Negative for chills and fever  HENT: Negative for rhinorrhea and sore throat  Eyes: Negative for pain and visual disturbance  Respiratory: Negative for cough and shortness of breath      Cardiovascular: Negative for chest pain and leg swelling  Gastrointestinal: Positive for abdominal pain, diarrhea and nausea  Negative for vomiting  Genitourinary: Negative for dysuria and hematuria  Musculoskeletal: Negative for back pain and myalgias  Skin: Negative for rash and wound  Neurological: Negative for syncope and headaches  All other systems reviewed and negative  Physical Exam      ED Triage Vitals [08/24/20 1543]   Temperature Pulse Respirations Blood Pressure SpO2   98 3 °F (36 8 °C) 80 18 118/64 97 %      Temp Source Heart Rate Source Patient Position - Orthostatic VS BP Location FiO2 (%)   Temporal Monitor Sitting Left arm --      Pain Score       8               Physical Exam  Constitutional:       General: He is not in acute distress  Appearance: He is not ill-appearing  HENT:      Head: Normocephalic and atraumatic  Nose: Nose normal       Mouth/Throat:      Mouth: Mucous membranes are moist    Eyes:      Extraocular Movements: Extraocular movements intact  Pupils: Pupils are equal, round, and reactive to light  Neck:      Musculoskeletal: Normal range of motion and neck supple  Cardiovascular:      Rate and Rhythm: Normal rate and regular rhythm  Pulmonary:      Effort: No respiratory distress  Breath sounds: Normal breath sounds  No wheezing  Abdominal:      General: There is no distension  Comments: Epigastric and left lower quadrant tenderness to palpation   Musculoskeletal: Normal range of motion  General: No swelling or deformity  Skin:     General: Skin is warm  Findings: No erythema  Neurological:      Mental Status: He is alert and oriented to person, place, and time  Mental status is at baseline                Diagnostic Results      Labs:    Results Reviewed     Procedure Component Value Units Date/Time    CMP [[id number]]  (Abnormal) Collected:  08/24/20 1816    Lab Status:  Final result Specimen:  Blood from Arm, Right Updated:  08/24/20 1912     Sodium 142 mmol/L      Potassium 3 9 mmol/L      Chloride 107 mmol/L      CO2 28 mmol/L      ANION GAP 7 mmol/L      BUN 11 mg/dL      Creatinine 0 65 mg/dL      Glucose 90 mg/dL      Calcium 8 3 mg/dL      AST 85 U/L      ALT 66 U/L      Alkaline Phosphatase 135 U/L      Total Protein 6 5 g/dL      Albumin 3 5 g/dL      Total Bilirubin 0 70 mg/dL      eGFR 116 ml/min/1 73sq m     Narrative:       National Kid[employer] Foundation guidelines for Chronic Kidney Disease (CKD):     Stage 1 with normal or high GFR (GFR > 90 mL/min/1 73 square meters)    Stage 2 Mild CKD (GFR = 60-89 mL/min/1 73 square meters)    Stage 3A Moderate CKD (GFR = 45-59 mL/min/1 73 square meters)    Stage 3B Moderate CKD (GFR = 30-44 mL/min/1 73 square meters)    Stage 4 Severe CKD (GFR = 15-29 mL/min/1 73 square meters)    Stage 5 End Stage CKD (GFR <15 mL/min/1 73 square meters)  Note: GFR calculation is accurate only with a steady state creatinine    Lipase [[id number]]  (Normal) Collected:  08/24/20 1816    Lab Status:  Final result Specimen:  Blood from Arm, Right Updated:  08/24/20 1912     Lipase 166 u/L     CBC and differential [[id number]]  (Abnormal) Collected:  08/24/20 1731    Lab Status:  Final result Specimen:  Blood from Arm, Right Updated:  08/24/20 1833     WBC 4 68 Thousand/uL      RBC 3 43 Million/uL      Hemoglobin 11 5 g/dL      Hematocrit 34 8 %       fL      MCH 33 5 pg      MCHC 33 0 g/dL      RDW 13 6 %      MPV 11 2 fL      Platelets 87 Thousands/uL      nRBC 0 /100 WBCs      Neutrophils Relative 54 %      Immat GRANS % 0 %      Lymphocytes Relative 37 %      Monocytes Relative 8 %      Eosinophils Relative 1 %      Basophils Relative 0 %      Neutrophils Absolute 2 51 Thousands/µL      Immature Grans Absolute 0 02 Thousand/uL      Lymphocytes Absolute 1 71 Thousands/µL      Monocytes Absolute 0 38 Thousand/µL      Eosinophils Absolute 0 05 Thousand/µL      Basophils Absolute 0 01 Thousands/µL           All labs reviewed and utilized in the medical decision making process    Radiology:    CT Abdomen pelvis with contrast    (Results Pending)       All radiology studies independently viewed by me and interpreted by the radiologist     Procedure    Procedures        MDM    Patient presenting with abdominal pain as described above  History of chronic pancreatitis and gastritis  Abdominal examination primarily concerning for gastritis/ulcer and possible diverticulitis  However differential includes pancreatitis, colitis, ureterolithiasis  Will give the patient analgesia and antiemetic medication  Will obtain blood work  Will obtain CT scan of abdomen/pelvis  ED Course as of Aug 24 1916   Mon Aug 24, 2020   1853 WBC: 4 68   1853 Hemoglobin(!): 11 5   1853 Platelet Count(!): 87   1912 CMP clinically reassuring  1913 Slightly elevated but not high enough for acute pancreatitis  Lipase: 166   1915 Patient care signed out to Dr Ibeth Hernandez with CT scan still pending, if negative, he can likely be discharged home              Medications   morphine (PF) 4 mg/mL injection 4 mg (4 mg Intravenous Given 8/24/20 1736)   ondansetron (ZOFRAN) injection 4 mg (4 mg Intravenous Given 8/24/20 1736)   aluminum-magnesium hydroxide-simethicone (MYLANTA) 200-200-20 mg/5 mL oral suspension 30 mL (30 mL Oral Given 8/24/20 1903)   Lidocaine Viscous HCl (XYLOCAINE) 2 % mucosal solution 15 mL (15 mL Swish & Spit Given 8/24/20 1903)           FINAL IMPRESSION    Final diagnoses:   Epigastric pain   Left lower quadrant abdominal pain         DISPOSITION    Time reflects when diagnosis was documented in both MDM as applicable and the Disposition within this note     Time User Action Codes Description Comment    8/24/2020  7:15 PM Paulino [place] Add [R10 13] Epigastric pain     8/24/2020  7:15 PM Paulino [place] Add [R10 32] Left lower quadrant abdominal pain       ED Disposition     None      Follow-up Information    None           PATIENT REFERRED TO:    No follow-up provider specified  DISCHARGE MEDICATIONS:    Patient's Medications   Discharge Prescriptions    No medications on file       No discharge procedures on file           Maricarmen Calderon, [address],   08/24/20 4848

## 2020-08-25 ENCOUNTER — TELEPHONE (OUTPATIENT)
Dept: GASTROENTEROLOGY | Facility: CLINIC | Age: 45
End: 2020-08-25

## 2020-08-25 DIAGNOSIS — K29.30 CHRONIC SUPERFICIAL GASTRITIS WITHOUT BLEEDING: Primary | ICD-10-CM

## 2020-08-25 RX ORDER — ALBUTEROL SULFATE 90 UG/1
2 AEROSOL, METERED RESPIRATORY (INHALATION) EVERY 6 HOURS PRN
Qty: 3 INHALER | Refills: 0 | Status: SHIPPED | OUTPATIENT
Start: 2020-08-25 | End: 2020-09-15

## 2020-08-25 NOTE — TELEPHONE ENCOUNTER
It looks like there was some fluid next to the colon that the radiologist interpreted as inflammatory from a possible recent episode of colitis since he reported diarrhea for the past 2 days  Colitis can happen from a virus or infection  The pancreas looked normal, the stomach looked thickened which is a chronic finding  His labs otherwise looked pretty good, 2 of his liver numbers were a little elevated but better than previously so we should just repeat labs in 6 weeks to recheck his liver numbers along with checking his triglycerides if he hasn't had those checked recently  He should have a CMP and triglycerides bloodwork checked in 6 weeks

## 2020-08-25 NOTE — ED CARE HANDOFF
Emergency Department Sign Out Note        Sign out and transfer of care from [facility]  See Separate Emergency Department note  The patient, Cornelio Rincon, was evaluated by the previous provider for Dr David Garzon  Workup Completed:  Labs, ct scan  ED Course / Workup Pending (followup):  Ct scan shows mild inflammation/free fluid to R colon and parcacolic gutter  Abdomen mildly tender on R side, non-peritoneal  This may acute infectious colitis  Pt stable, not in severe pain, ok for d/c home w/ GI f/u  Not having diarrhea  Will start on cipro and flagyl                             Procedures  MDM    Disposition  Final diagnoses:   Epigastric pain   Left lower quadrant abdominal pain   Colitis   Abdominal pain, unspecified abdominal location     Time reflects when diagnosis was documented in both MDM as applicable and the Disposition within this note     Time User Action Codes Description Comment    8/24/2020  7:15 PM Rayetta Platts Add [R10 13] Epigastric pain     8/24/2020  7:15 PM Rayetta Platts Add [R10 32] Left lower quadrant abdominal pain     8/24/2020  9:41 PM Maye Rakes [K52 9] Colitis     8/24/2020  9:41 PM Merla Murrain Add [R10 9] Abdominal pain, unspecified abdominal location     8/24/2020  9:41 PM Kenya Board [R10 13] Epigastric pain     8/24/2020  9:41 PM Merla Murrain Modify [K52 9] Colitis       ED Disposition     ED Disposition Condition Date/Time Comment    Discharge Stable Mon Aug 24, 2020  9:41 PM Cornelio Rincon discharge to home/self care              Follow-up Information    None       Discharge Medication List as of 8/24/2020  9:42 PM      START taking these medications    Details   ciprofloxacin (CIPRO) 500 mg tablet Take 1 tablet (500 mg total) by mouth 2 (two) times a day for 7 days, Starting Mon 8/24/2020, Until Mon 8/31/2020, Print      metroNIDAZOLE (FLAGYL) 500 mg tablet Take 1 tablet (500 mg total) by mouth every 8 (eight) hours for 7 days, Starting Mon 8/24/2020, Until Mon 8/31/2020, Print         CONTINUE these medications which have NOT CHANGED    Details   !! albuterol (PROVENTIL HFA,VENTOLIN HFA) 90 mcg/act inhaler Inhale 2 puffs every 4 (four) hours as needed for wheezing, Starting Thu 5/14/2020, Normal      !! albuterol (VENTOLIN HFA) 90 mcg/act inhaler Inhale 2 puffs every 6 (six) hours as needed for wheezing Prn, Starting Thu 11/14/2019, Until Fri 11/13/2020, Normal      amLODIPine (NORVASC) 5 mg tablet Take 1 tablet (5 mg total) by mouth daily, Starting Mon 6/15/2020, Normal      Choline Fenofibrate (FENOFIBRIC ACID) 135 MG CPDR Take 1 capsule (135 mg total) by mouth daily, Starting Wed 5/6/2020, Normal      esomeprazole (NexIUM) 40 MG capsule Take 1 capsule (40 mg total) by mouth 2 (two) times a day before meals, Starting Mon 8/17/2020, Normal      famotidine (PEPCID) 40 MG tablet Take 40 mg by mouth 2 (two) times a day, Historical Med      lisinopril (ZESTRIL) 20 mg tablet Take 1 tablet (20 mg total) by mouth daily, Starting Mon 6/1/2020, Normal      omega-3-acid ethyl esters (LOVAZA) 1 g capsule TAKE TWO CAPSULES BY MOUTH TWICE DAILY , Normal      ondansetron (ZOFRAN-ODT) 4 mg disintegrating tablet DISSOLVE ONE TABLET IN MOUTH EVERY EIGHT HOURS AS NEEDED NAUSEA AND VOMITING , Normal      !! pancrelipase, Lip-Prot-Amyl, (CREON) 12,000 units capsule Take 12,000 units of lipase by mouth as needed for snacks, Starting Thu 4/4/2019, Normal      !!  Pancrelipase, Lip-Prot-Amyl, (Creon) 45387 units CPEP Take by mouth, Historical Med      pregabalin (LYRICA) 100 mg capsule TAKE ONE CAPSULE BY MOUTH THREE TIMES DAILY , Normal      rosuvastatin (CRESTOR) 40 MG tablet TAKE ONE TABLET BY MOUTH EVERY DAY , Normal      traZODone (DESYREL) 50 mg tablet TAKE ONE TABLET BY MOUTH NIGHTLY AT BEDTIME , Normal      diclofenac sodium (VOLTAREN) 1 % diclofenac 1 % topical gel   APPLY 2 GRAMS TO THE AFFECTED AREA(S) BY TOPICAL ROUTE 4 TIMES PER DAY, Historical Med      EPIPEN 2-ARIEL 0 3 MG/0 3ML SOAJ Inject one syringe (0 3mg) intramuscularly once as directed, Normal      Lidocaine-Menthol 4-1 % GEL NuLido 4 %-1 % topical gel   Apply to affected area 2-4 times a day as needed for pain, Historical Med      LORazepam (ATIVAN) 0 5 mg tablet Take 1 tablet (0 5 mg total) by mouth daily as needed for anxiety, Starting Mon 6/15/2020, Normal      niacin (NIASPAN) 500 mg CR tablet Take 500 mg by mouth, Starting Mon 1/20/2020, Until Tue 1/19/2021, Historical Med      omeprazole (PriLOSEC) 20 mg delayed release capsule Take 20 mg by mouth daily, Historical Med      pantoprazole (PROTONIX) 40 mg tablet Take 1 tablet (40 mg total) by mouth 2 (two) times a day, Starting Mon 1/6/2020, Normal       !! - Potential duplicate medications found  Please discuss with provider  No discharge procedures on file         ED Provider  Electronically Signed by     Agus Louis DO  08/24/20 4040

## 2020-08-25 NOTE — TELEPHONE ENCOUNTER
----- Message from Baljit Urrutia sent at 8/25/2020  1:03 PM EDT -----  Regarding: Non-Urgent Medical Question  Contact: [phone]  I was recently in the emergency room for what I thought was my stomach or pancreas, but was surprised by their diagnosis  They told me it was Colitis  Now they did do bloodwork and a cat scan, I was just wondering if you could look into it further as they were very vague with me       Thank you for any assistance,    Baljit Urrutia

## 2020-09-11 DIAGNOSIS — E78.2 MIXED HYPERLIPIDEMIA: ICD-10-CM

## 2020-09-11 RX ORDER — ROSUVASTATIN CALCIUM 40 MG/1
TABLET, COATED ORAL
Qty: 90 TABLET | Refills: 0 | OUTPATIENT
Start: 2020-09-11

## 2020-09-15 ENCOUNTER — TELEPHONE (OUTPATIENT)
Dept: PAIN MEDICINE | Facility: CLINIC | Age: 45
End: 2020-09-15

## 2020-09-15 ENCOUNTER — OFFICE VISIT (OUTPATIENT)
Dept: FAMILY MEDICINE CLINIC | Facility: CLINIC | Age: 45
End: 2020-09-15
Payer: COMMERCIAL

## 2020-09-15 VITALS
SYSTOLIC BLOOD PRESSURE: 118 MMHG | WEIGHT: 200.6 LBS | HEIGHT: 71 IN | HEART RATE: 83 BPM | DIASTOLIC BLOOD PRESSURE: 76 MMHG | TEMPERATURE: 98.2 F | OXYGEN SATURATION: 99 % | BODY MASS INDEX: 28.08 KG/M2

## 2020-09-15 DIAGNOSIS — K86.1 OTHER CHRONIC PANCREATITIS (HCC): Primary | ICD-10-CM

## 2020-09-15 DIAGNOSIS — E78.1 HYPERTRIGLYCERIDEMIA: ICD-10-CM

## 2020-09-15 DIAGNOSIS — G89.4 CHRONIC PAIN SYNDROME: ICD-10-CM

## 2020-09-15 PROCEDURE — 99214 OFFICE O/P EST MOD 30 MIN: CPT | Performed by: FAMILY MEDICINE

## 2020-09-15 RX ORDER — INSULIN DEGLUDEC INJECTION 100 U/ML
INJECTION, SOLUTION SUBCUTANEOUS
COMMUNITY
Start: 2020-06-22

## 2020-09-15 RX ORDER — PREGABALIN 100 MG/1
100 CAPSULE ORAL 2 TIMES DAILY
Qty: 90 CAPSULE | Refills: 3 | Status: SHIPPED | OUTPATIENT
Start: 2020-09-15 | End: 2021-03-09

## 2020-09-15 NOTE — PROGRESS NOTES
BMI Counseling: Body mass index is 27 98 kg/m²  The BMI is above normal  Nutrition recommendations include decreasing portion sizes, decreasing fast food intake, limiting drinks that contain sugar, moderation in carbohydrate intake and reducing intake of saturated and trans fat  Exercise recommendations include exercising 3-5 times per week and obtaining a gym membership  No pharmacotherapy was ordered  Depression Screening and Follow-up Plan: Clincally patient does not have depression  No treatment is required  Tobacco Cessation Counseling: Tobacco cessation counseling was provided  The patient is sincerely urged to quit consumption of tobacco  He is not ready to quit tobacco      Assessment/Plan:     Chronic Problems:  No problem-specific Assessment & Plan notes found for this encounter  Visit Diagnosis:  Diagnoses and all orders for this visit:    Other chronic pancreatitis (HCC)    Chronic pain syndrome  -     pregabalin (LYRICA) 100 mg capsule; Take 1 capsule (100 mg total) by mouth 2 (two) times a day    Hypertriglyceridemia    Other orders  -     insulin degludec Lady Contras FlexTouch) 100 units/mL injection pen; 20 units subcut in hs    Chronic pain syndrome  Discussed plan of care, refill on Lyrica x1, patient to schedule follow-up with pain management  Chronic pancreatitis  Discussed in reviewed issues, currently off of Creon secondary to cost, has scheduled follow-up with GI, instructed to repeat previously she has scheduled laboratories  htn  Stable, continue current medications      Subjective:    Patient ID: Leny Basilio is a 50 y o  male      Here for hosp f/u   Abdominal pain, diagnosed colitis, no longer with antibiotics times to denies any fever chills nausea vomiting, negative blood in stool   chronic pan , pain management   md presley , has yet to schedule f/u appt   Chronic pancreatitis, elevated triglycerides  Has made contact GI, has yet to go for follow-up laboratories in regard to triglycerides  No longer taking the creon, secondary to cost ,   htn no problems with meds , not cost prohibitive ,   Negative chest pain palpitations shortness of breath difficulty breathing cough lesions rash  Knee , workmans comp issue still out , Md comp , continues to work limited schedule  Blood pressure, Norvasc, negative changes      The following portions of the patient's history were reviewed and updated as appropriate: allergies, current medications, past family history, past medical history, past social history, past surgical history and problem list     Review of Systems   Constitutional: Negative for appetite change, chills, fever and unexpected weight change  HENT: Negative for congestion, dental problem, ear pain, hearing loss, postnasal drip, rhinorrhea, sinus pressure, sinus pain, sneezing, sore throat, tinnitus and voice change  Eyes: Negative for visual disturbance  Respiratory: Negative for apnea, cough, chest tightness and shortness of breath  Cardiovascular: Negative for chest pain, palpitations and leg swelling  Gastrointestinal: Negative for abdominal pain, blood in stool, constipation, diarrhea, nausea and vomiting  Endocrine: Negative for cold intolerance, heat intolerance, polydipsia, polyphagia and polyuria  Genitourinary: Negative for decreased urine volume, difficulty urinating, dysuria, frequency and hematuria  Musculoskeletal: Positive for arthralgias (Knee injury, workman's comp)  Negative for back pain, gait problem, joint swelling and myalgias  Skin: Negative for color change, rash and wound  Allergic/Immunologic: Negative for environmental allergies and food allergies  Neurological: Negative for dizziness, syncope, weakness, light-headedness, numbness and headaches  Hematological: Negative for adenopathy  Does not bruise/bleed easily  Psychiatric/Behavioral: Negative for sleep disturbance and suicidal ideas  The patient is not nervous/anxious  /76   Pulse 83   Temp 98 2 °F (36 8 °C)   Ht 5' 11" (1 803 m)   Wt 91 kg (200 lb 9 6 oz)   SpO2 99%   BMI 27 98 kg/m²   Social History     Socioeconomic History    Marital status: /Civil Union     Spouse name: Not on file    Number of children: Not on file    Years of education: Not on file    Highest education level: Not on file   Occupational History    Occupation: fulltime employment   Social Needs    Financial resource strain: Not on file    Food insecurity     Worry: Not on file     Inability: Not on file   [employer] needs     Medical: Not on file     Non-medical: Not on file   Tobacco Use    Smoking status: Current Every Day Smoker     Packs/day: 0 50     Years: 20 00     Pack years: 10 00    Smokeless tobacco: Never Used   Substance and Sexual Activity    Alcohol use:  Yes     Alcohol/week: 10 0 standard drinks     Types: 10 Cans of beer per week     Drinks per session: 3 or 4     Comment: quit    Drug use: No    Sexual activity: Yes     Partners: Female   Lifestyle    Physical activity     Days per week: Not on file     Minutes per session: Not on file    Stress: Not on file   Relationships    Social connections     Talks on phone: Not on file     Gets together: Not on file     Attends Buddhism service: Not on file     Active member of club or organization: Not on file     Attends meetings of clubs or organizations: Not on file     Relationship status: Not on file    Intimate partner violence     Fear of current or ex partner: Not on file     Emotionally abused: Not on file     Physically abused: Not on file     Forced sexual activity: Not on file   Other Topics Concern    Not on file   Social History Narrative    Lives with family    Daily caffeine consumption     Past Medical History:   Diagnosis Date    Asthma     Chronic pain disorder     GERD (gastroesophageal reflux disease)     Hyperlipidemia     Liver abscess     Meniscus tear     left knee work injury last assessed 08/24/2016    Pancreatitis     Pneumonia      Family History   Problem Relation Age of Onset    Cirrhosis Mother     Heart disease Other         cardiac disorder    Cancer Other      Past Surgical History:   Procedure Laterality Date    CELIAC PLEXUS BLOCK Bilateral 10/29/2018    Procedure: SPLANCHNIC NERVE BLOCK;  Surgeon: Florence Flynn MD;  [facility];  Service: Pain Management     CELIAC PLEXUS BLOCK Bilateral 1/10/2019    Procedure: SPLANCHNIC NERVE BLOCK;  Surgeon: Florence Flynn MD;  [facility];  Service: Pain Management     CHOLECYSTECTOMY      ESOPHAGOGASTRODUODENOSCOPY N/A 11/16/2017    Procedure: ESOPHAGOGASTRODUODENOSCOPY (EGD); Surgeon: Parrish Billingsley MD;  [facility]; Service: Gastroenterology    ESOPHAGOGASTRODUODENOSCOPY N/A 4/19/2018    Procedure: ESOPHAGOGASTRODUODENOSCOPY (EGD); Surgeon: Pierce Campuzano MD;  [facility]; Service: Gastroenterology    ESOPHAGOGASTRODUODENOSCOPY N/A 5/10/2018    Procedure: ESOPHAGOGASTRODUODENOSCOPY (EGD); Surgeon: Steve Donahue MD;  [facility];   Service: Gastroenterology    IR TEMPORARY DIALYSIS CATHETER PLACEMENT  2/28/2019    KNEE ARTHROSCOPY Bilateral     KNEE ARTHROSCOPY Right 2009    cibishino last assessed 08/24/2016    KNEE ARTHROSCOPY Right 07/01/2019    LIVER SURGERY      NERVE BLOCK Bilateral 5/29/2018    Procedure: SPLANCHNIC NERVE BLOCK;  Surgeon: Florence Flynn MD;  [facility];  Service: Pain Management     PANCREAS SURGERY      stents    PANCREATIC CYST EXCISION      HI INJECT NERV BLCK,CELIAC PLEXUS Bilateral 5/9/2017    Procedure: CELIAC PLEXUS BLOCK ;  Surgeon: Florence Flynn MD;  [facility];  Service: Pain Management     HI INJECT NERV BLCK,CELIAC PLEXUS Bilateral 6/1/2017    Procedure: SPLANCHNIC NERVE BLOCK at T12;  Surgeon: Florence Flynn MD;  [facility];  Service: Pain Management     HI INJECT NERV [address] Bilateral 8/8/2017    Procedure: BILATERAL SPLANCHNIC NERVE BLOCK T12;  Surgeon: Iris Robison MD;  [facility];  Service: Pain Management     MT INJECT NERV BLCK,CELIAC PLEXUS Bilateral 4/18/2019    Procedure: BLOCK / INJECTION CELIAC PLEXUS;  Surgeon: Iris Robison MD;  [facility];  Service: Pain Management     MT INJECT NERV BLCK,CELIAC PLEXUS Bilateral 8/20/2019    Procedure: SPLANCHNIC NERVE BLOCK;  Surgeon: Iris Robison MD;  [facility];  Service: Pain Management     MT INJECT NERV BLCK,CELIAC PLEXUS Bilateral 10/17/2019    Procedure: SPLANCHNIC NERVE BLOCK;  Surgeon: Iris Robison MD;  [facility];  Service: Pain Management     MT INJECT NERV Jamal Diego Bilateral 12/28/2017    Procedure: SPLANCHNIC NERVE BLOCK;  Surgeon: Iris Robison MD;  [facility];  Service: Pain Management     MT LAP,CHOLECYSTECTOMY N/A 2/14/2017    Procedure: LAPAROSCOPIC CHOLECYSTECTOMY, IOC, POSSIBLE OPEN ;  Surgeon: Cresencio Silva MD;  [facility];  Service: General    ROTATOR CUFF REPAIR Right     SHOULDER ARTHROSCOPY      SHOULDER ARTHROSCOPY Right        Current Outpatient Medications:     insulin degludec Devonda Furry FlexTouch) 100 units/mL injection pen, 20 units subcut in hs, Disp: , Rfl:     amLODIPine (NORVASC) 5 mg tablet, Take 1 tablet (5 mg total) by mouth daily, Disp: 30 tablet, Rfl: 3    Choline Fenofibrate (FENOFIBRIC ACID) 135 MG CPDR, Take 1 capsule (135 mg total) by mouth daily, Disp: 90 capsule, Rfl: 2    diclofenac sodium (VOLTAREN) 1 %, diclofenac 1 % topical gel  APPLY 2 GRAMS TO THE AFFECTED AREA(S) BY TOPICAL ROUTE 4 TIMES PER DAY, Disp: , Rfl:     EPIPEN 2-ARIEL 0 3 MG/0 3ML SOAJ, Inject one syringe (0 3mg) intramuscularly once as directed, Disp: 2 each, Rfl: 0    esomeprazole (NexIUM) 40 MG capsule, Take 1 capsule (40 mg total) by mouth 2 (two) times a day before meals, Disp: 60 capsule, Rfl: 5    famotidine (PEPCID) 40 MG tablet, Take 40 mg by mouth 2 (two) times a day, Disp: , Rfl:     Lidocaine-Menthol 4-1 % GEL, NuLido 4 %-1 % topical gel  Apply to affected area 2-4 times a day as needed for pain, Disp: , Rfl:     lisinopril (ZESTRIL) 20 mg tablet, Take 1 tablet (20 mg total) by mouth daily, Disp: 30 tablet, Rfl: 3    omega-3-acid ethyl esters (LOVAZA) 1 g capsule, TAKE TWO CAPSULES BY MOUTH TWICE DAILY , Disp: 360 capsule, Rfl: 0    omeprazole (PriLOSEC) 20 mg delayed release capsule, Take 20 mg by mouth daily, Disp: , Rfl:     ondansetron (ZOFRAN-ODT) 4 mg disintegrating tablet, DISSOLVE ONE TABLET IN MOUTH EVERY EIGHT HOURS AS NEEDED NAUSEA AND VOMITING , Disp: 30 tablet, Rfl: 0    pancrelipase, Lip-Prot-Amyl, (CREON) 12,000 units capsule, Take 12,000 units of lipase by mouth as needed for snacks, Disp: 90 capsule, Rfl: 2    Pancrelipase, Lip-Prot-Amyl, (Creon) 50247 units CPEP, Take by mouth, Disp: , Rfl:     pregabalin (LYRICA) 100 mg capsule, Take 1 capsule (100 mg total) by mouth 2 (two) times a day, Disp: 90 capsule, Rfl: 3    rosuvastatin (CRESTOR) 40 MG tablet, TAKE ONE TABLET BY MOUTH EVERY DAY , Disp: 90 tablet, Rfl: 0    traZODone (DESYREL) 50 mg tablet, TAKE ONE TABLET BY MOUTH NIGHTLY AT BEDTIME , Disp: 30 tablet, Rfl: 0    Allergies   Allergen Reactions    Bee Venom Swelling          Lab Review   Admission on 08/24/2020, Discharged on 08/24/2020   Component Date Value    WBC 08/24/2020 4 68     RBC 08/24/2020 3 43*    Hemoglobin 08/24/2020 11 5*    Hematocrit 08/24/2020 34 8*    MCV 08/24/2020 102*    MCH 08/24/2020 33 5     MCHC 08/24/2020 33 0     RDW 08/24/2020 13 6     MPV 08/24/2020 11 2     Platelets 09/31/8470 87*    nRBC 08/24/2020 0     Neutrophils Relative 08/24/2020 54     Immat GRANS % 08/24/2020 0     Lymphocytes Relative 08/24/2020 37     Monocytes Relative 08/24/2020 8     Eosinophils Relative 08/24/2020 1     Basophils Relative 08/24/2020 0     Neutrophils Absolute 08/24/2020 2 51     Immature Grans Absolute 08/24/2020 0 02     Lymphocytes Absolute 08/24/2020 1 71     Monocytes Absolute 08/24/2020 0 38     Eosinophils Absolute 08/24/2020 0 05     Basophils Absolute 08/24/2020 0 01     Sodium 08/24/2020 142     Potassium 08/24/2020 3 9     Chloride 08/24/2020 107     CO2 08/24/2020 28     ANION GAP 08/24/2020 7     BUN 08/24/2020 11     Creatinine 08/24/2020 0 65     Glucose 08/24/2020 90     Calcium 08/24/2020 8 3     AST 08/24/2020 85*    ALT 08/24/2020 66     Alkaline Phosphatase 08/24/2020 135*    Total Protein 08/24/2020 6 5     Albumin 08/24/2020 3 5     Total Bilirubin 08/24/2020 0 70     eGFR 08/24/2020 116     Lipase 08/24/2020 166    Admission on 05/14/2020, Discharged on 05/14/2020   Component Date Value    WBC 05/14/2020 5 72     RBC 05/14/2020 3 97     Hemoglobin 05/14/2020 13 3     Hematocrit 05/14/2020 39 1     MCV 05/14/2020 99*    MCH 05/14/2020 33 5     MCHC 05/14/2020 34 0     RDW 05/14/2020 13 1     MPV 05/14/2020 11 0     Platelets 09/57/8059 108*    nRBC 05/14/2020 0     Neutrophils Relative 05/14/2020 49     Immat GRANS % 05/14/2020 0     Lymphocytes Relative 05/14/2020 39     Monocytes Relative 05/14/2020 10     Eosinophils Relative 05/14/2020 1     Basophils Relative 05/14/2020 1     Neutrophils Absolute 05/14/2020 2 81     Immature Grans Absolute 05/14/2020 0 02     Lymphocytes Absolute 05/14/2020 2 21     Monocytes Absolute 05/14/2020 0 58     Eosinophils Absolute 05/14/2020 0 06     Basophils Absolute 05/14/2020 0 04     Protime 05/14/2020 16 0*    INR 05/14/2020 1 27*    PTT 05/14/2020 36     TSH 3RD GENERATON 05/14/2020 1  141     Magnesium 05/14/2020 1 9     Sodium 05/14/2020 143     Potassium 05/14/2020 3 6     Chloride 05/14/2020 106     CO2 05/14/2020 24     ANION GAP 05/14/2020 13     BUN 05/14/2020 13     Creatinine 05/14/2020 0 73     Glucose 05/14/2020 125     Calcium 05/14/2020 8 4  eGFR 05/14/2020 110     D-Dimer, Quant 05/14/2020 <0 27     Troponin I 05/14/2020 <0 02     SARS-CoV-2 05/14/2020 Negative     Troponin I 05/14/2020 <0 02     Ventricular Rate 05/14/2020 77     Atrial Rate 05/14/2020 77     MS Interval 05/14/2020 162     QRSD Interval 05/14/2020 126     QT Interval 05/14/2020 408     QTC Interval 05/14/2020 461     P Axis 05/14/2020 60     QRS Axis 05/14/2020 63     T Wave Axis 05/14/2020 62     Ventricular Rate 05/14/2020 85     Atrial Rate 05/14/2020 85     MS Interval 05/14/2020 160     QRSD Interval 05/14/2020 120     QT Interval 05/14/2020 392     QTC Interval 05/14/2020 466     P Axis 05/14/2020 60     QRS Axis 05/14/2020 58     T Wave Axis 05/14/2020 55     Ventricular Rate 05/14/2020 90     Atrial Rate 05/14/2020 90     MS Interval 05/14/2020 158     QRSD Interval 05/14/2020 120     QT Interval 05/14/2020 390     QTC Interval 05/14/2020 477     P Axis 05/14/2020 64     QRS Axis 05/14/2020 65     T Wave Axis 05/14/2020 62    Orders Only on 04/13/2020   Component Date Value    SARS-CoV-2  04/13/2020 Not Detected    Hospital Outpatient Visit on 03/16/2020   Component Date Value    POC Glucose 03/16/2020 69     Case Report 03/16/2020                      Value:Surgical Pathology Report                         Case: [id number]                                   Authorizing Provider:  Haven De León MD             Collected:           03/16/2020 1549              Ordering Location:     [address] [place] [address]      Received:            03/16/2020 [facility]                                                           Pathologist:           Kady Salomon MD                                                              Specimens:   A) - Stomach, Distal gastric body bx-cold bx                                                        B) - Stomach, Mid gastric body bx-cold bx C) - Stomach, Proximal gastric body/fundus-cold bx                                         Addendum 03/16/2020                      Value: This result contains rich text formatting which cannot be displayed here   Final Diagnosis 03/16/2020                      Value: This result contains rich text formatting which cannot be displayed here   Additional Information 03/16/2020                      Value: This result contains rich text formatting which cannot be displayed here  Tracey Gross Description 03/16/2020                      Value: This result contains rich text formatting which cannot be displayed here  Imaging: Ct Abdomen Pelvis With Contrast    Result Date: 8/24/2020  Narrative: CT ABDOMEN AND PELVIS WITH IV CONTRAST INDICATION:   LLQ and epigastric ttp   24-year-old male; history of epigastric pain and left lower quadrant abdominal pain COMPARISON:  Multiple prior examinations, most recent CT abdomen pelvis exam dated 1/15/2020  TECHNIQUE:  CT examination of the abdomen and pelvis was performed  Axial, sagittal, and coronal 2D reformatted images were created from the source data and submitted for interpretation  Radiation dose length product (DLP) for this visit:  712 86 mGy-cm   This examination, like all CT scans performed in the [facility], was performed utilizing techniques to minimize radiation dose exposure, including the use of iterative  reconstruction and automated exposure control  IV Contrast:  100 mL of iohexol (OMNIPAQUE) Enteric Contrast:  Enteric contrast was not administered  FINDINGS: ABDOMEN LOWER CHEST:  No clinically significant abnormality identified in the visualized lower chest  LIVER/BILIARY TREE:  Stable hepatomegaly without any enhancing lesions  Mild fatty infiltration  GALLBLADDER:  Gallbladder is surgically absent  SPLEEN:  Stable mild splenomegaly and accessory splenule at the hilum  PANCREAS:  Unremarkable   ADRENAL GLANDS: Unremarkable  KIDNEYS/URETERS:  Unremarkable  No hydronephrosis  STOMACH AND BOWEL:  Redemonstrated diffuse gastric wall thickening, similar in appearance to prior study  Stable nonopacified average caliber small bowel without evidence of obstruction  APPENDIX:  No findings to suggest appendicitis  ABDOMINOPELVIC CAVITY:  No ascites  No pneumoperitoneum  No lymphadenopathy  Small amount of inflammatory stranding/free fluid seen in the right paracolic gutter, nonspecific  VESSELS:  Atherosclerotic changes are present  No evidence of aneurysm  PELVIS REPRODUCTIVE ORGANS:  Unremarkable for patient's age  URINARY BLADDER:  Unremarkable  ABDOMINAL WALL/INGUINAL REGIONS:  Unchanged tiny fat-containing periumbilical and inguinal hernias  OSSEOUS STRUCTURES:  No acute fracture or destructive osseous lesion  Stable bilateral femoral head sclerotic densities compatible with sequela of AVN  Impression: New small volume fluid/inflammatory stranding in the right paracolic gutter, nonspecific but may reflect sequela of recent infectious/inflammatory etiology i e  focal colitis  No bowel obstruction, discrete collections, or free air in the abdomen or pelvis  Stable hepatosplenomegaly  Workstation performed: [id number]       Objective:     Physical Exam  Constitutional:       General: He is not in acute distress  Appearance: He is well-developed  He is not ill-appearing  HENT:      Head: Normocephalic and atraumatic  Eyes:      General: No scleral icterus  Conjunctiva/sclera: Conjunctivae normal    Neck:      Musculoskeletal: Normal range of motion and neck supple  Cardiovascular:      Rate and Rhythm: Normal rate and regular rhythm  Heart sounds: Normal heart sounds  Pulmonary:      Effort: Pulmonary effort is normal       Breath sounds: Normal breath sounds  Musculoskeletal: Normal range of motion  Skin:     General: Skin is warm and dry     Neurological:      Mental Status: He is alert and oriented to person, place, and time  Deep Tendon Reflexes: Reflexes are normal and symmetric  Psychiatric:         Behavior: Behavior normal          Thought Content: Thought content normal          Judgment: Judgment normal            There are no Patient Instructions on file for this visit  LUANA Hernandez    Portions of the record may have been created with voice recognition software  Occasional wrong word or "sound a like" substitutions may have occurred due to the inherent limitations of voice recognition software  Read the chart carefully and recognize, using context, where substitutions have occurred

## 2020-09-15 NOTE — TELEPHONE ENCOUNTER
----- Message from Martina Tapia RN sent at 9/15/2020  8:17 AM EDT -----  Regarding: FW: Prescription Question  Contact: [phone]  Please schedule with SP or Letitia Uribe  Pt not seen since 11/2019 and was only seen by SI  Please send back to clinical pool after scheduled to address refill request until pt can be seen in office  ----- Message -----  From: Tania Dong  Sent: 9/14/2020   9:21 PM EDT  To: [facility]  Subject: Prescription Question                            How long does it take for a refill request?  If an appointment is needed please let me know  I have been planning an appointment as the pain is coming back  The ER told me it was an infection in my intestines   I put this off until absolutely needed, I do not understand the delay

## 2020-09-15 NOTE — TELEPHONE ENCOUNTER
Lm on vm for pt to c/b  Pls sched as per below ASAPOLO w/Dr Ronak Garcia or [facility]  Then send back to Clinical as per below

## 2020-09-21 ENCOUNTER — TELEPHONE (OUTPATIENT)
Dept: PAIN MEDICINE | Facility: CLINIC | Age: 45
End: 2020-09-21

## 2020-09-21 DIAGNOSIS — E78.2 MIXED HYPERLIPIDEMIA: ICD-10-CM

## 2020-09-21 RX ORDER — ROSUVASTATIN CALCIUM 40 MG/1
TABLET, COATED ORAL
Qty: 90 TABLET | Refills: 0 | Status: SHIPPED | OUTPATIENT
Start: 2020-09-21 | End: 2020-09-22 | Stop reason: SDUPTHER

## 2020-09-21 NOTE — TELEPHONE ENCOUNTER
----- Message from Martina Tapia RN sent at 9/15/2020  8:17 AM EDT -----  Regarding: FW: Prescription Question  Contact: [phone]  Please schedule with SP or Letitia Uribe  Pt not seen since 11/2019 and was only seen by SI  Please send back to clinical pool after scheduled to address refill request until pt can be seen in office  ----- Message -----  From: Tania Dong  Sent: 9/14/2020   9:21 PM EDT  To: [facility]  Subject: Prescription Question                            How long does it take for a refill request?  If an appointment is needed please let me know  I have been planning an appointment as the pain is coming back  The ER told me it was an infection in my intestines   I put this off until absolutely needed, I do not understand the delay

## 2020-09-22 DIAGNOSIS — E78.2 MIXED HYPERLIPIDEMIA: ICD-10-CM

## 2020-09-23 ENCOUNTER — OFFICE VISIT (OUTPATIENT)
Dept: PAIN MEDICINE | Facility: CLINIC | Age: 45
End: 2020-09-23
Payer: COMMERCIAL

## 2020-09-23 VITALS
TEMPERATURE: 98.4 F | HEIGHT: 71 IN | SYSTOLIC BLOOD PRESSURE: 119 MMHG | WEIGHT: 204.2 LBS | RESPIRATION RATE: 20 BRPM | HEART RATE: 93 BPM | DIASTOLIC BLOOD PRESSURE: 79 MMHG | BODY MASS INDEX: 28.59 KG/M2

## 2020-09-23 DIAGNOSIS — R10.13 EPIGASTRIC PAIN: Primary | ICD-10-CM

## 2020-09-23 DIAGNOSIS — K85.90 PANCREATITIS, UNSPECIFIED PANCREATITIS TYPE: ICD-10-CM

## 2020-09-23 DIAGNOSIS — K86.1 OTHER CHRONIC PANCREATITIS (HCC): ICD-10-CM

## 2020-09-23 PROCEDURE — 99214 OFFICE O/P EST MOD 30 MIN: CPT | Performed by: STUDENT IN AN ORGANIZED HEALTH CARE EDUCATION/TRAINING PROGRAM

## 2020-09-23 RX ORDER — ROSUVASTATIN CALCIUM 40 MG/1
40 TABLET, COATED ORAL DAILY
Qty: 90 TABLET | Refills: 0 | Status: SHIPPED | OUTPATIENT
Start: 2020-09-23 | End: 2021-03-30

## 2020-09-23 NOTE — PROGRESS NOTES
Pain Medicine Follow-Up Note    Assessment:  1  Epigastric pain    2  Other chronic pancreatitis ([address] )    3  Pancreatitis, unspecified pancreatitis type        Plan:  Orders Placed This Encounter   Procedures    FL spine and pain procedure     Standing Status:   Future     Standing Expiration Date:   9/23/2024     Order Specific Question:   Reason for Exam:     Answer:   celiac plexus block right     Order Specific Question:   Anticoagulant hold needed? Answer:   No       No orders of the defined types were placed in this encounter  My impressions and treatment recommendations were discussed in detail with the patient who verbalized understanding and had no further questions  This is a 25-year-old male who returns for follow-up with recurrence of abdominal pain secondary to chronic pancreatitis  He has responded well to bilateral celiac plexus blocks in the past   Given his symptoms and improvement in the past, we will schedule him for repeat block  This will be done with my partner Dr Jamison Lyon and I will be present as well  We will be doing one side at a time  [place] Prescription Drug Monitoring Program report was reviewed and was appropriate      Given the patient's symptoms, examination findings and imaging results noted above, I discussed the utility of proceeding with a Bilateral celiac plexus block under fluoroscopic guidance  Using an anatomical model, the procedure, as well as its potential risks, benefits, and reasonable alternatives were discussed in detail  Discussed risks of the procedure included but are not limited to bleeding, infection, allergic reaction, nerve damage, hematoma fomation, abscess formation, failure of the pain to improve and potential worsening of the pain        Since Mr Rowdy Valdez has failed at least 6 weeks of conservative measures including over-the-counter pain medications, prescription medications and a home exercise program it is reasonable to proceed with the above injection  The patient verbalized understanding to the potential risks, benefits, and reasonable alternatives to the above injection and wishes to proceed  His response will help to further determine a treatment plan  Follow-up is planned in 4 weeks following injection or sooner as warranted  Discharge instructions were provided  I personally saw and examined the patient and I agree with the above discussed plan of care  History of Present Illness:    Judy Navarro is a 50 y o  male who presents to [facility] for interval re-evaluation of the above stated pain complaints  The patient has a past medical and chronic pain history as outlined in the assessment section  He was last seen on 10/17/19 for bilateral celiac plexus block  He reports excellent relief from the procedure and returns with recurrence of symptoms  Since his last visit he reports that his symptoms have worsened  He rates his pain as a 6/10  The pain is located in the epigastrium and bilateral upper abdomen and is described as sharp and shooting and is intermittent in nature  He reports that the symptoms are consistent with his symptoms prior to the previous injection  He also continues with pregabalin 100 mg t i d  As prescribed by his PCP  He has no issues with this medication and reports continued relief with it  Other than as stated above, the patient denies any interval changes in medications, medical condition, mental condition, symptoms, or allergies since the last office visit  Review of Systems:    Review of Systems   Respiratory: Negative for shortness of breath  Cardiovascular: Negative for chest pain  Gastrointestinal: Positive for nausea  Negative for constipation, diarrhea and vomiting  Musculoskeletal: Negative for arthralgias, gait problem, joint swelling and myalgias  Skin: Negative for rash  Neurological: Negative for dizziness, seizures and weakness  All other systems reviewed and are negative  Patient Active Problem List   Diagnosis    Pancreatic lesion    Renal mass    Encounter for smoking cessation counseling    Leukocytosis    RBBB    Epigastric pain    Acute gastritis without hemorrhage    Generalized abdominal pain    Abnormal transaminases    Acute on chronic pancreatitis (HCC)    Leukopenia    Hypertriglyceridemia    Chronic pain syndrome    Esophagitis    Other chronic pancreatitis (HCC)    Chronic gastritis without bleeding    Uncomplicated opioid dependence ([facility] [address] )    Long-term current use of opiate analgesic    GERD (gastroesophageal reflux disease)    Hyponatremia    Healthcare maintenance    Elevated blood pressure reading    Upper abdominal pain    Hypertension    Pancreatitis, unspecified pancreatitis type    Abdominal pain, epigastric    Change in bowel habits       Past Medical History:   Diagnosis Date    Asthma     Chronic pain disorder     GERD (gastroesophageal reflux disease)     Hyperlipidemia     Liver abscess     Meniscus tear     left knee work injury last assessed 08/24/2016    Pancreatitis     Pneumonia        Past Surgical History:   Procedure Laterality Date    CELIAC PLEXUS BLOCK Bilateral 10/29/2018    Procedure: SPLANCHNIC NERVE BLOCK;  Surgeon: Jose Costello MD;  [facility];  Service: Pain Management     CELIAC PLEXUS BLOCK Bilateral 1/10/2019    Procedure: SPLANCHNIC NERVE BLOCK;  Surgeon: Jose Costello MD;  [facility];  Service: Pain Management     CHOLECYSTECTOMY      ESOPHAGOGASTRODUODENOSCOPY N/A 11/16/2017    Procedure: ESOPHAGOGASTRODUODENOSCOPY (EGD); Surgeon: Nel Correia MD;  [facility]; Service: Gastroenterology    ESOPHAGOGASTRODUODENOSCOPY N/A 4/19/2018    Procedure: ESOPHAGOGASTRODUODENOSCOPY (EGD); Surgeon: Ravin Sarah MD;  [facility];   Service: Gastroenterology    ESOPHAGOGASTRODUODENOSCOPY N/A 5/10/2018    Procedure: ESOPHAGOGASTRODUODENOSCOPY (EGD); Surgeon: Gabriele Martínez MD;  [facility];   Service: Gastroenterology    IR TEMPORARY DIALYSIS CATHETER PLACEMENT  2/28/2019    KNEE ARTHROSCOPY Bilateral     KNEE ARTHROSCOPY Right 2009    [place]ino last assessed 08/24/2016    KNEE ARTHROSCOPY Right 07/01/2019    LIVER SURGERY      NERVE BLOCK Bilateral 5/29/2018    Procedure: SPLANCHNIC NERVE BLOCK;  Surgeon: Nasim Wang MD;  [facility];  Service: Pain Management     PANCREAS SURGERY      stents    PANCREATIC CYST EXCISION      TN INJECT NERV BLCK,CELIAC PLEXUS Bilateral 5/9/2017    Procedure: CELIAC PLEXUS BLOCK ;  Surgeon: Nasim Wang MD;  [facility];  Service: Pain Management     TN INJECT NERV BLCK,CELIAC PLEXUS Bilateral 6/1/2017    Procedure: SPLANCHNIC NERVE BLOCK at T12;  Surgeon: Nasim Wang MD;  [facility];  Service: Pain Management     TN INJECT NERV BLCK,CELIAC PLEXUS Bilateral 8/8/2017    Procedure: BILATERAL SPLANCHNIC NERVE BLOCK T12;  Surgeon: Nasim Wang MD;  [facility];  Service: Pain Management     TN INJECT NERV BLCK,CELIAC PLEXUS Bilateral 4/18/2019    Procedure: BLOCK / INJECTION CELIAC PLEXUS;  Surgeon: Nasim Wang MD;  [facility];  Service: Pain Management     TN INJECT NERV BLCK,CELIAC PLEXUS Bilateral 8/20/2019    Procedure: SPLANCHNIC NERVE BLOCK;  Surgeon: Nasim Wang MD;  [facility];  Service: Pain Management     TN INJECT NERV BLCK,CELIAC PLEXUS Bilateral 10/17/2019    Procedure: SPLANCHNIC NERVE BLOCK;  Surgeon: Nasim Wang MD;  [facility];  Service: Pain Management     TN INJECT NERV [place] Bilateral 12/28/2017    Procedure: SPLANCHNIC NERVE BLOCK;  Surgeon: Nasim Wang MD;  [facility];  Service: Pain Management     TN LAP,CHOLECYSTECTOMY N/A 2/14/2017    Procedure: LAPAROSCOPIC CHOLECYSTECTOMY, IOC, POSSIBLE OPEN ;  Surgeon: Chet Reardon MD;  [facility];  Service: General    ROTATOR CUFF REPAIR Right     SHOULDER ARTHROSCOPY      SHOULDER ARTHROSCOPY Right        Family History   Problem Relation Age of Onset    Cirrhosis Mother     Heart disease Other         cardiac disorder    Cancer Other        Social History     Occupational History    Occupation: fulltime employment   Tobacco Use    Smoking status: Current Every Day Smoker     Packs/day: 0 50     Years: 20 00     Pack years: 10 00    Smokeless tobacco: Never Used   Substance and Sexual Activity    Alcohol use: Not Currently     Alcohol/week: 10 0 standard drinks     Types: 10 Cans of beer per week     Drinks per session: 3 or 4     Comment: quit    Drug use: No    Sexual activity: Yes     Partners: Female         Current Outpatient Medications:     amLODIPine (NORVASC) 5 mg tablet, Take 1 tablet (5 mg total) by mouth daily, Disp: 30 tablet, Rfl: 3    Choline Fenofibrate (FENOFIBRIC ACID) 135 MG CPDR, Take 1 capsule (135 mg total) by mouth daily, Disp: 90 capsule, Rfl: 2    diclofenac sodium (VOLTAREN) 1 %, diclofenac 1 % topical gel  APPLY 2 GRAMS TO THE AFFECTED AREA(S) BY TOPICAL ROUTE 4 TIMES PER DAY, Disp: , Rfl:     EPIPEN 2-ARIEL 0 3 MG/0 3ML SOAJ, Inject one syringe (0 3mg) intramuscularly once as directed, Disp: 2 each, Rfl: 0    esomeprazole (NexIUM) 40 MG capsule, Take 1 capsule (40 mg total) by mouth 2 (two) times a day before meals, Disp: 60 capsule, Rfl: 5    famotidine (PEPCID) 40 MG tablet, Take 40 mg by mouth 2 (two) times a day, Disp: , Rfl:     insulin degludec Dois Minors FlexTouch) 100 units/mL injection pen, 20 units subcut in hs, Disp: , Rfl:     Lidocaine-Menthol 4-1 % GEL, NuLido 4 %-1 % topical gel  Apply to affected area 2-4 times a day as needed for pain, Disp: , Rfl:     lisinopril (ZESTRIL) 20 mg tablet, Take 1 tablet (20 mg total) by mouth daily, Disp: 30 tablet, Rfl: 3    omega-3-acid ethyl esters (LOVAZA) 1 g capsule, TAKE TWO CAPSULES BY MOUTH TWICE DAILY , Disp: 360 capsule, Rfl: 0    omeprazole (PriLOSEC) 20 mg delayed release capsule, Take 20 mg by mouth daily, Disp: , Rfl:     ondansetron (ZOFRAN-ODT) 4 mg disintegrating tablet, DISSOLVE ONE TABLET IN MOUTH EVERY EIGHT HOURS AS NEEDED NAUSEA AND VOMITING , Disp: 30 tablet, Rfl: 0    pancrelipase, Lip-Prot-Amyl, (CREON) 12,000 units capsule, Take 12,000 units of lipase by mouth as needed for snacks, Disp: 90 capsule, Rfl: 2    Pancrelipase, Lip-Prot-Amyl, (Creon) 03647 units CPEP, Take by mouth, Disp: , Rfl:     pregabalin (LYRICA) 100 mg capsule, Take 1 capsule (100 mg total) by mouth 2 (two) times a day, Disp: 90 capsule, Rfl: 3    traZODone (DESYREL) 50 mg tablet, TAKE ONE TABLET BY MOUTH NIGHTLY AT BEDTIME , Disp: 30 tablet, Rfl: 0    rosuvastatin (CRESTOR) 40 MG tablet, Take 1 tablet (40 mg total) by mouth daily, Disp: 90 tablet, Rfl: 0    Allergies   Allergen Reactions    Bee Venom Swelling       Physical Exam:    /79   Pulse 93   Temp 98 4 °F (36 9 °C) (Temporal)   Resp 20   Ht 5' 11" (1 803 m)   Wt 92 6 kg (204 lb 3 2 oz)   BMI 28 48 kg/m²     Constitutional:normal, well developed, well nourished, alert, in no distress and non-toxic and no overt pain behavior    Eyes:anicteric  HEENT:grossly intact  Neck:supple, symmetric, trachea midline and no masses   Pulmonary:even and unlabored  Cardiovascular:No edema or pitting edema present  Skin:Normal without rashes or lesions and well hydrated  Psychiatric:Mood and affect appropriate  Neurologic:Cranial Nerves II-XII grossly intact  Musculoskeletal:normal    Imaging  FL spine and pain procedure    (Results Pending)         Orders Placed This Encounter   Procedures    FL spine and pain procedure

## 2020-09-25 DIAGNOSIS — I10 ESSENTIAL HYPERTENSION: ICD-10-CM

## 2020-09-25 RX ORDER — LISINOPRIL 20 MG/1
TABLET ORAL
Qty: 30 TABLET | Refills: 0 | Status: SHIPPED | OUTPATIENT
Start: 2020-09-25 | End: 2020-11-27

## 2020-09-27 ENCOUNTER — TELEPHONE (OUTPATIENT)
Dept: OTHER | Facility: OTHER | Age: 45
End: 2020-09-27

## 2020-09-27 NOTE — TELEPHONE ENCOUNTER
----- Message from Shelley Simpson MA sent at 9/24/2020  8:35 AM EDT -----  Regarding: Schedule Pt  Arsenio Lara,    Dr Verona Paniagua ordered Right Celiac Plexus Block for this pt to be done with Dr Karrie Proctor in [place]  Dr Verona Paniagua has already discussed this with Dr Karrie Proctor and also Dr Verona Paniagua would like to be present for the procedure so if it could be scheduled on a Friday if possible  Pt is aware he will be going to [place], I am not sure if they told him about COVID test as I was not here  Can you please schedule this and let me know if you need me to do anything?  Thanks

## 2020-09-28 NOTE — TELEPHONE ENCOUNTER
On reviewing the chart, it appears that Dr Hood Wilkins was doing splanchnic nerve blocks for this patient  I have never done those, so I would suggest speaking to Dr Peyton Granado to see if he can continue doing them for this patient

## 2020-10-09 DIAGNOSIS — E78.2 MIXED HYPERLIPIDEMIA: ICD-10-CM

## 2020-10-09 RX ORDER — OMEGA-3-ACID ETHYL ESTERS 1 G/1
CAPSULE, LIQUID FILLED ORAL
Qty: 360 CAPSULE | Refills: 0 | Status: SHIPPED | OUTPATIENT
Start: 2020-10-09 | End: 2021-01-13

## 2020-10-20 DIAGNOSIS — I10 ESSENTIAL HYPERTENSION: ICD-10-CM

## 2020-10-21 RX ORDER — AMLODIPINE BESYLATE 5 MG/1
TABLET ORAL
Qty: 30 TABLET | Refills: 0 | Status: SHIPPED | OUTPATIENT
Start: 2020-10-21 | End: 2020-11-27

## 2020-10-28 NOTE — TELEPHONE ENCOUNTER
Sorry for the delay  Unfortunately no other providers perform this procedure in our practice at this time and I currently do not perform them  He will likely need referral to Interventional Radiology    I will try to find the doctors who perform this procedure

## 2020-10-29 ENCOUNTER — TELEPHONE (OUTPATIENT)
Dept: RADIOLOGY | Facility: CLINIC | Age: 45
End: 2020-10-29

## 2020-10-29 DIAGNOSIS — G89.4 CHRONIC PAIN SYNDROME: Primary | ICD-10-CM

## 2020-11-08 DIAGNOSIS — G47.00 INSOMNIA, UNSPECIFIED TYPE: ICD-10-CM

## 2020-11-09 RX ORDER — ONDANSETRON 4 MG/1
TABLET, ORALLY DISINTEGRATING ORAL
Qty: 30 TABLET | Refills: 0 | Status: SHIPPED | OUTPATIENT
Start: 2020-11-09 | End: 2020-12-18

## 2020-11-10 ENCOUNTER — TELEPHONE (OUTPATIENT)
Dept: FAMILY MEDICINE CLINIC | Facility: CLINIC | Age: 45
End: 2020-11-10

## 2020-11-10 ENCOUNTER — TELEPHONE (OUTPATIENT)
Dept: PAIN MEDICINE | Facility: CLINIC | Age: 45
End: 2020-11-10

## 2020-11-12 ENCOUNTER — OFFICE VISIT (OUTPATIENT)
Dept: GASTROENTEROLOGY | Facility: CLINIC | Age: 45
End: 2020-11-12
Payer: COMMERCIAL

## 2020-11-12 ENCOUNTER — TELEPHONE (OUTPATIENT)
Dept: GASTROENTEROLOGY | Facility: CLINIC | Age: 45
End: 2020-11-12

## 2020-11-12 VITALS
TEMPERATURE: 98.4 F | SYSTOLIC BLOOD PRESSURE: 128 MMHG | WEIGHT: 205 LBS | BODY MASS INDEX: 28.59 KG/M2 | DIASTOLIC BLOOD PRESSURE: 82 MMHG | HEART RATE: 91 BPM

## 2020-11-12 DIAGNOSIS — K86.1 CHRONIC PANCREATITIS, UNSPECIFIED PANCREATITIS TYPE (HCC): ICD-10-CM

## 2020-11-12 DIAGNOSIS — R14.0 ABDOMINAL DISTENTION: Primary | ICD-10-CM

## 2020-11-12 DIAGNOSIS — K59.00 CONSTIPATION, ACUTE: ICD-10-CM

## 2020-11-12 PROCEDURE — 99214 OFFICE O/P EST MOD 30 MIN: CPT | Performed by: PHYSICIAN ASSISTANT

## 2020-11-12 RX ORDER — PANCRELIPASE 60000; 12000; 38000 [USP'U]/1; [USP'U]/1; [USP'U]/1
12000 CAPSULE, DELAYED RELEASE PELLETS ORAL AS NEEDED
Qty: 90 CAPSULE | Refills: 2 | Status: SHIPPED | OUTPATIENT
Start: 2020-11-12 | End: 2021-12-03

## 2020-11-12 RX ORDER — PANCRELIPASE 36000; 180000; 114000 [USP'U]/1; [USP'U]/1; [USP'U]/1
36000 CAPSULE, DELAYED RELEASE PELLETS ORAL
Qty: 90 CAPSULE | Refills: 3 | Status: SHIPPED | OUTPATIENT
Start: 2020-11-12 | End: 2021-12-03

## 2020-11-21 NOTE — NURSING NOTE
D/c instructions and return to work release provided  All questions answered   Pt and spouse ambulated off unit independently
I have reviewed and confirmed nurses' notes...

## 2020-11-27 DIAGNOSIS — I10 ESSENTIAL HYPERTENSION: ICD-10-CM

## 2020-11-27 RX ORDER — LISINOPRIL 20 MG/1
TABLET ORAL
Qty: 30 TABLET | Refills: 0 | Status: SHIPPED | OUTPATIENT
Start: 2020-11-27 | End: 2020-11-30

## 2020-11-27 RX ORDER — AMLODIPINE BESYLATE 5 MG/1
TABLET ORAL
Qty: 30 TABLET | Refills: 0 | Status: SHIPPED | OUTPATIENT
Start: 2020-11-27 | End: 2020-12-30

## 2020-11-30 ENCOUNTER — TELEPHONE (OUTPATIENT)
Dept: PAIN MEDICINE | Facility: CLINIC | Age: 45
End: 2020-11-30

## 2020-11-30 ENCOUNTER — TELEPHONE (OUTPATIENT)
Dept: FAMILY MEDICINE CLINIC | Facility: CLINIC | Age: 45
End: 2020-11-30

## 2020-11-30 DIAGNOSIS — I10 ESSENTIAL HYPERTENSION: ICD-10-CM

## 2020-11-30 RX ORDER — LISINOPRIL 20 MG/1
TABLET ORAL
Qty: 30 TABLET | Refills: 0 | Status: SHIPPED | OUTPATIENT
Start: 2020-11-30 | End: 2020-12-30

## 2020-12-03 DIAGNOSIS — K86.1 CHRONIC PANCREATITIS (HCC): Primary | ICD-10-CM

## 2020-12-10 LAB — HBA1C MFR BLD HPLC: 5.1 %

## 2020-12-14 ENCOUNTER — HOSPITAL ENCOUNTER (OUTPATIENT)
Dept: CT IMAGING | Facility: CLINIC | Age: 45
Discharge: HOME | End: 2020-12-14
Payer: COMMERCIAL

## 2020-12-14 DIAGNOSIS — K86.1 CHRONIC PANCREATITIS, UNSPECIFIED PANCREATITIS TYPE (HCC): ICD-10-CM

## 2020-12-14 DIAGNOSIS — K59.00 CONSTIPATION, ACUTE: ICD-10-CM

## 2020-12-14 DIAGNOSIS — R14.0 ABDOMINAL DISTENTION: ICD-10-CM

## 2020-12-14 PROCEDURE — G1004 CDSM NDSC: HCPCS

## 2020-12-14 PROCEDURE — 74177 CT ABD & PELVIS W/CONTRAST: CPT

## 2020-12-14 RX ADMIN — IOHEXOL 100 ML: 350 INJECTION, SOLUTION INTRAVENOUS at 10:19

## 2020-12-17 DIAGNOSIS — G47.00 INSOMNIA, UNSPECIFIED TYPE: ICD-10-CM

## 2020-12-18 ENCOUNTER — TELEPHONE (OUTPATIENT)
Dept: GASTROENTEROLOGY | Facility: CLINIC | Age: 45
End: 2020-12-18

## 2020-12-18 DIAGNOSIS — K58.0 IRRITABLE BOWEL SYNDROME WITH DIARRHEA: Primary | ICD-10-CM

## 2020-12-18 DIAGNOSIS — K74.60 CIRRHOSIS OF LIVER WITH ASCITES, UNSPECIFIED HEPATIC CIRRHOSIS TYPE (HCC): ICD-10-CM

## 2020-12-18 DIAGNOSIS — R18.8 CIRRHOSIS OF LIVER WITH ASCITES, UNSPECIFIED HEPATIC CIRRHOSIS TYPE (HCC): ICD-10-CM

## 2020-12-18 RX ORDER — ONDANSETRON 4 MG/1
TABLET, ORALLY DISINTEGRATING ORAL
Qty: 30 TABLET | Refills: 0 | Status: SHIPPED | OUTPATIENT
Start: 2020-12-18 | End: 2021-03-15

## 2020-12-22 DIAGNOSIS — K29.00 ACUTE GASTRITIS WITHOUT HEMORRHAGE, UNSPECIFIED GASTRITIS TYPE: Primary | ICD-10-CM

## 2020-12-30 ENCOUNTER — TELEPHONE (OUTPATIENT)
Dept: GASTROENTEROLOGY | Facility: CLINIC | Age: 45
End: 2020-12-30

## 2020-12-30 DIAGNOSIS — I10 ESSENTIAL HYPERTENSION: ICD-10-CM

## 2020-12-30 RX ORDER — LISINOPRIL 20 MG/1
TABLET ORAL
Qty: 30 TABLET | Refills: 0 | Status: SHIPPED | OUTPATIENT
Start: 2020-12-30 | End: 2021-01-27

## 2020-12-30 RX ORDER — AMLODIPINE BESYLATE 5 MG/1
TABLET ORAL
Qty: 30 TABLET | Refills: 0 | Status: SHIPPED | OUTPATIENT
Start: 2020-12-30 | End: 2021-01-27

## 2020-12-31 ENCOUNTER — PREP FOR PROCEDURE (OUTPATIENT)
Dept: INTERVENTIONAL RADIOLOGY/VASCULAR | Facility: CLINIC | Age: 45
End: 2020-12-31

## 2020-12-31 ENCOUNTER — TELEMEDICINE (OUTPATIENT)
Dept: INTERVENTIONAL RADIOLOGY/VASCULAR | Facility: CLINIC | Age: 45
End: 2020-12-31
Payer: COMMERCIAL

## 2020-12-31 DIAGNOSIS — K86.1 ACUTE ON CHRONIC PANCREATITIS (HCC): Primary | ICD-10-CM

## 2020-12-31 DIAGNOSIS — K85.90 ACUTE ON CHRONIC PANCREATITIS (HCC): Primary | ICD-10-CM

## 2020-12-31 PROCEDURE — 99243 OFF/OP CNSLTJ NEW/EST LOW 30: CPT | Performed by: STUDENT IN AN ORGANIZED HEALTH CARE EDUCATION/TRAINING PROGRAM

## 2021-01-13 DIAGNOSIS — E78.2 MIXED HYPERLIPIDEMIA: ICD-10-CM

## 2021-01-13 DIAGNOSIS — G47.00 INSOMNIA, UNSPECIFIED TYPE: ICD-10-CM

## 2021-01-13 RX ORDER — OMEGA-3-ACID ETHYL ESTERS 1 G/1
CAPSULE, LIQUID FILLED ORAL
Qty: 360 CAPSULE | Refills: 0 | Status: SHIPPED | OUTPATIENT
Start: 2021-01-13 | End: 2021-05-12 | Stop reason: SDUPTHER

## 2021-01-13 RX ORDER — TRAZODONE HYDROCHLORIDE 50 MG/1
TABLET ORAL
Qty: 30 TABLET | Refills: 0 | Status: SHIPPED | OUTPATIENT
Start: 2021-01-13 | End: 2021-05-03

## 2021-01-20 ENCOUNTER — OFFICE VISIT (OUTPATIENT)
Dept: FAMILY MEDICINE CLINIC | Facility: CLINIC | Age: 46
End: 2021-01-20
Payer: COMMERCIAL

## 2021-01-20 VITALS
HEART RATE: 92 BPM | WEIGHT: 189.6 LBS | HEIGHT: 71 IN | OXYGEN SATURATION: 97 % | DIASTOLIC BLOOD PRESSURE: 81 MMHG | RESPIRATION RATE: 21 BRPM | TEMPERATURE: 97.6 F | BODY MASS INDEX: 26.54 KG/M2 | SYSTOLIC BLOOD PRESSURE: 130 MMHG

## 2021-01-20 DIAGNOSIS — M54.2 MUSCULOSKELETAL NECK PAIN: Primary | ICD-10-CM

## 2021-01-20 PROCEDURE — 99213 OFFICE O/P EST LOW 20 MIN: CPT | Performed by: NURSE PRACTITIONER

## 2021-01-20 RX ORDER — PREDNISONE 20 MG/1
20 TABLET ORAL DAILY
Qty: 5 TABLET | Refills: 0 | Status: SHIPPED | OUTPATIENT
Start: 2021-01-20 | End: 2021-08-05

## 2021-01-20 RX ORDER — OXYCODONE HYDROCHLORIDE AND ACETAMINOPHEN 5; 325 MG/1; MG/1
1 TABLET ORAL EVERY 6 HOURS PRN
Qty: 20 TABLET | Refills: 0 | OUTPATIENT
Start: 2021-01-20 | End: 2021-09-14

## 2021-01-20 NOTE — ASSESSMENT & PLAN NOTE
X-ray ordered  Advised to use ice and heat at site  Prednisone daily for 5 days for inflammation  May use Percocet every 6 hours as needed for acute pain  May continue to take Tylenol    Will evaluate the need for physical therapy and orthopedic

## 2021-01-20 NOTE — PROGRESS NOTES
Assessment/Plan:  BMI Counseling: Body mass index is 26 44 kg/m²  The BMI is above normal  Nutrition recommendations include decreasing portion sizes, encouraging healthy choices of fruits and vegetables, decreasing fast food intake, consuming healthier snacks, limiting drinks that contain sugar, moderation in carbohydrate intake, increasing intake of lean protein, reducing intake of saturated and trans fat and reducing intake of cholesterol  Exercise recommendations include exercising 3-5 times per week and strength training exercises  No pharmacotherapy was ordered  Musculoskeletal neck pain    X-ray ordered  Advised to use ice and heat at site  Prednisone daily for 5 days for inflammation  May use Percocet every 6 hours as needed for acute pain  May continue to take Tylenol  Will evaluate the need for physical therapy and orthopedic         Problem List Items Addressed This Visit        Other    Musculoskeletal neck pain - Primary       X-ray ordered  Advised to use ice and heat at site  Prednisone daily for 5 days for inflammation  May use Percocet every 6 hours as needed for acute pain  May continue to take Tylenol  Will evaluate the need for physical therapy and orthopedic         Relevant Medications    predniSONE 20 mg tablet    oxyCODONE-acetaminophen (PERCOCET) 5-325 mg per tablet    Other Relevant Orders    XR spine cervical complete 4 or 5 vw non injury            Subjective:      Patient ID: Fadia Cash is a 50 y o  male  Patient is here with complaints acute back pain  States that  His back his neck feels very tender  Onset yesterday  Denies any injury  Patient is a   Does have problems with abdominal discomfort  Does have an injection scheduled with intervention radiology tomorrow  Denies any numbness or tingling in his arms or legs        The following portions of the patient's history were reviewed and updated as appropriate: allergies, current medications, past family history, past medical history, past social history, past surgical history and problem list     Review of Systems   Constitutional: Negative  HENT: Negative  Eyes: Negative  Respiratory: Negative  Cardiovascular: Negative  Gastrointestinal: Negative  Endocrine: Negative  Genitourinary: Negative  Musculoskeletal: Positive for back pain and neck pain  Skin: Negative  Allergic/Immunologic: Negative  Neurological: Negative  Hematological: Negative  Psychiatric/Behavioral: Negative  Objective:      /81 (BP Location: Left arm, Patient Position: Sitting, Cuff Size: Adult)   Pulse 92   Temp 97 6 °F (36 4 °C)   Resp 21   Ht 5' 11" (1 803 m)   Wt 86 kg (189 lb 9 6 oz)   SpO2 97%   BMI 26 44 kg/m²          Physical Exam  Vitals signs and nursing note reviewed  Constitutional:       Appearance: Normal appearance  He is well-developed  HENT:      Head: Normocephalic and atraumatic  Eyes:      Pupils: Pupils are equal, round, and reactive to light  Neck:      Musculoskeletal: Normal range of motion  Cardiovascular:      Rate and Rhythm: Normal rate and regular rhythm  Pulmonary:      Effort: Pulmonary effort is normal       Breath sounds: Normal breath sounds  Musculoskeletal: Normal range of motion  General: Swelling and tenderness (cervical) present  Skin:     General: Skin is warm and dry  Neurological:      Mental Status: He is alert and oriented to person, place, and time  Psychiatric:         Behavior: Behavior normal          Thought Content:  Thought content normal          Judgment: Judgment normal            Labs:    Lab Results   Component Value Date    WBC 4 68 08/24/2020    HGB 11 5 (L) 08/24/2020    HCT 34 8 (L) 08/24/2020     (H) 08/24/2020    PLT 87 (L) 08/24/2020     Lab Results   Component Value Date     04/26/2017    K 3 9 08/24/2020     08/24/2020    CO2 28 08/24/2020    BUN 11 08/24/2020 CREATININE 0 65 08/24/2020    GLUF 93 05/09/2018    CALCIUM 8 3 08/24/2020    AST 85 (H) 08/24/2020    ALT 66 08/24/2020    ALKPHOS 135 (H) 08/24/2020    PROT 7 3 04/26/2017    BILITOT 1 8 (H) 04/26/2017    EGFR 116 08/24/2020     Lab Results   Component Value Date    CALCIUM 8 3 08/24/2020     04/26/2017    K 3 9 08/24/2020    CO2 28 08/24/2020     08/24/2020    BUN 11 08/24/2020    CREATININE 0 65 08/24/2020

## 2021-01-20 NOTE — PATIENT INSTRUCTIONS
Take prednisone daily for 5 days   Get x-ray done may use ice at site   take Percocet every 6 hours as needed for acute pain lots of fluids lots of fiber to prevent constipation with the Percocet  Neck Pain   WHAT YOU NEED TO KNOW:   You may have sudden neck pain that increases quickly  You may instead feel pain build slowly over time  Neck pain may go away in a few days or weeks, or it may continue for months  The pain may come and go, or be worse with certain movements  The pain may be only in your neck, or it may move to your arms, back, or shoulders  You may also have pain that starts in another body area and moves to your neck  Some types of neck pain are permanent  DISCHARGE INSTRUCTIONS:   Return to the emergency department if:   · You have an injury that causes neck pain and shooting pain down your arms or legs  · Your neck pain suddenly becomes severe  · You have neck pain along with numbness, tingling, or weakness in your arms or legs  · You have a stiff neck, a headache, and a fever  Contact your healthcare provider if:   · You have new or worsening symptoms  · Your symptoms continue even after treatment  · You have questions or concerns about your condition or care  Medicines: You may need any of the following:  · NSAIDs  , such as ibuprofen, help decrease swelling, pain, and fever  This medicine is available without a doctor's order  Ask your healthcare provider which medicine to take and how often to take it  Follow directions  NSAIDs can cause stomach bleeding or kidney problems if not taken correctly  If you take blood thinner medicine, always ask if NSAIDs are safe for you  · Acetaminophen  helps decrease pain and fever  Ask your healthcare provider how much to take and how often to take it  Follow directions  Acetaminophen can cause liver damage if not taken correctly  · Steroid medicine  may be used to reduce inflammation   This can help relieve pain caused by swelling  · Take your medicine as directed  Contact your healthcare provider if you think your medicine is not helping or if you have side effects  Tell him or her if you are allergic to any medicine  Keep a list of the medicines, vitamins, and herbs you take  Include the amounts, and when and why you take them  Bring the list or the pill bottles to follow-up visits  Carry your medicine list with you in case of an emergency  Manage or prevent neck pain:   · Rest your neck as directed  Do not make sudden movements, such as turning your head quickly  Your healthcare provider may recommend you wear a cervical collar for a short time  The collar will prevent you from moving your head  This will give your neck time to heal if an injury is causing your neck pain  Ask your healthcare provider when you can return to sports or other normal daily activities  · Apply heat as directed  Heat helps relieve pain and swelling  Use a heat wrap, or soak a small towel in warm water  Wring out the extra water  Apply the heat wrap or towel for 20 minutes every hour, or as directed  · Apply ice as directed  Ice helps relieve pain and swelling, and can help prevent tissue damage  Use an ice pack, or put ice in a bag  Cover the ice pack or back with a towel before you apply it to your neck  Apply the ice pack or ice for 15 minutes every hour, or as directed  Your healthcare provider can tell you how often to apply ice  · Do neck exercises as directed  Neck exercises help strengthen the muscles and increase range of motion  Your healthcare provider will tell you which exercises are right for you  He may give you instructions, or he may recommend that you work with a physical therapist  Your healthcare provider or therapist can make sure you are doing the exercises correctly  · Maintain good posture  Try to keep your head and shoulders lifted when you sit   If you work in front of a computer, make sure the monitor is at the right level  You should not need to look up down to see the screen  You should also not have to lean forward to be able to read what is on the screen  Make sure your keyboard, mouse, and other computer items are placed where you do not have to extend your shoulder to reach them  Get up often if you work in front of a computer or sit for long periods of time  Stretch or walk around to keep your neck muscles loose  Follow up with your healthcare provider as directed: Your healthcare provider may refer you to a specialist if your pain does not get better with treatment  Write down your questions so you remember to ask them during your visits  © Copyright [address] Information is for End User's use only and may not be sold, redistributed or otherwise used for commercial purposes  All illustrations and images included in CareNotes® are the copyrighted property of A D A M , Inc  or Kady Estrada  The above information is an  only  It is not intended as medical advice for individual conditions or treatments  Talk to your doctor, nurse or pharmacist before following any medical regimen to see if it is safe and effective for you

## 2021-01-21 ENCOUNTER — HOSPITAL ENCOUNTER (OUTPATIENT)
Dept: RADIOLOGY | Facility: HOSPITAL | Age: 46
Discharge: HOME | End: 2021-01-21
Payer: COMMERCIAL

## 2021-01-21 ENCOUNTER — HOSPITAL ENCOUNTER (OUTPATIENT)
Dept: CT IMAGING | Facility: HOSPITAL | Age: 46
Discharge: HOME | End: 2021-01-21
Attending: STUDENT IN AN ORGANIZED HEALTH CARE EDUCATION/TRAINING PROGRAM
Payer: COMMERCIAL

## 2021-01-21 DIAGNOSIS — M54.2 MUSCULOSKELETAL NECK PAIN: ICD-10-CM

## 2021-01-21 DIAGNOSIS — K85.90 ACUTE ON CHRONIC PANCREATITIS (HCC): ICD-10-CM

## 2021-01-21 DIAGNOSIS — K86.1 ACUTE ON CHRONIC PANCREATITIS (HCC): ICD-10-CM

## 2021-01-21 PROCEDURE — 72050 X-RAY EXAM NECK SPINE 4/5VWS: CPT

## 2021-01-21 PROCEDURE — 74150 CT ABDOMEN W/O CONTRAST: CPT | Performed by: STUDENT IN AN ORGANIZED HEALTH CARE EDUCATION/TRAINING PROGRAM

## 2021-01-21 RX ORDER — LIDOCAINE WITH 8.4% SOD BICARB 0.9%(10ML)
SYRINGE (ML) INJECTION CODE/TRAUMA/SEDATION MEDICATION
Status: COMPLETED | OUTPATIENT
Start: 2021-01-21 | End: 2021-01-21

## 2021-01-21 RX ORDER — BUPIVACAINE HYDROCHLORIDE 5 MG/ML
20 INJECTION, SOLUTION EPIDURAL; INTRACAUDAL ONCE
Status: DISCONTINUED | OUTPATIENT
Start: 2021-01-21 | End: 2021-01-25 | Stop reason: HOSPADM

## 2021-01-21 RX ADMIN — Medication 5 ML: at 12:10

## 2021-01-21 NOTE — BRIEF OP NOTE (RAD/CATH)
INTERVENTIONAL RADIOLOGY PROCEDURE NOTE    Date: 1/21/2021    Procedure: IR OTHER    Preoperative diagnosis:   1  Acute on chronic pancreatitis (HCC)         Postoperative diagnosis: Same  Surgeon: Jonathan Tanner MD     Assistant: None  No qualified resident was available  Blood loss: 0 ml    Specimens: None  Findings:   The patient was having too much pain during needle placement  Will reschedule nerve root block with sedation  Complications: None immediate      Anesthesia: local

## 2021-01-21 NOTE — PROGRESS NOTES
Interventional Radiology Preprocedure Note    History/Indication for procedure:   Tri Marvin is a 50 y o  male with a PMH of pancreatitis who presents for CT guided splanchnic nerve root block  There were no vitals taken for this visit      Relevant past medical history:    Past Medical History:   Diagnosis Date    Asthma     Chronic pain disorder     GERD (gastroesophageal reflux disease)     Hyperlipidemia     Liver abscess     Meniscus tear     left knee work injury last assessed 08/24/2016    Pancreatitis     Pneumonia      Patient Active Problem List   Diagnosis    Pancreatic lesion    Renal mass    Encounter for smoking cessation counseling    Leukocytosis    RBBB    Epigastric pain    Acute gastritis without hemorrhage    Generalized abdominal pain    Abnormal transaminases    Acute on chronic pancreatitis (HCC)    Leukopenia    Hypertriglyceridemia    Chronic pain syndrome    Esophagitis    Other chronic pancreatitis (HCC)    Chronic gastritis without bleeding    Uncomplicated opioid dependence ([facility] [address] )    Long-term current use of opiate analgesic    GERD (gastroesophageal reflux disease)    Hyponatremia    Healthcare maintenance    Elevated blood pressure reading    Upper abdominal pain    Hypertension    Pancreatitis, unspecified pancreatitis type    Abdominal pain, epigastric    Change in bowel habits    Musculoskeletal neck pain       Medications:    Inpatient Medications:     Scheduled Medications:  Current Facility-Administered Medications   Medication Dose Route Frequency Provider Last Rate    bupivacaine (PF)  20 mL Infiltration Once Jonathan Tanner MD         Infusions:       PRN:      Outpatient Medications:  Current Outpatient Medications on File Prior to Encounter   Medication Sig Dispense Refill    amLODIPine (NORVASC) 5 mg tablet TAKE ONE TABLET BY MOUTH EVERY DAY  30 tablet 0    Choline Fenofibrate (FENOFIBRIC ACID) 135 MG CPDR Take 1 capsule (135 mg total) by mouth daily 90 capsule 2    diclofenac sodium (VOLTAREN) 1 % diclofenac 1 % topical gel   APPLY 2 GRAMS TO THE AFFECTED AREA(S) BY TOPICAL ROUTE 4 TIMES PER DAY      EPIPEN 2-ARIEL 0 3 MG/0 3ML SOAJ Inject one syringe (0 3mg) intramuscularly once as directed 2 each 0    esomeprazole (NexIUM) 40 MG capsule Take 1 capsule (40 mg total) by mouth 2 (two) times a day before meals 60 capsule 5    famotidine (PEPCID) 40 MG tablet Take 40 mg by mouth 2 (two) times a day      insulin degludec David Linear FlexTouch) 100 units/mL injection pen 20 units subcut in hs      Lidocaine-Menthol 4-1 % GEL NuLido 4 %-1 % topical gel   Apply to affected area 2-4 times a day as needed for pain      lisinopril (ZESTRIL) 20 mg tablet TAKE ONE TABLET BY MOUTH ONCE A DAY  30 tablet 0    omega-3-acid ethyl esters (LOVAZA) 1 g capsule TAKE TWO CAPSULES BY MOUTH TWICE DAILY  360 capsule 0    omeprazole (PriLOSEC) 20 mg delayed release capsule Take 20 mg by mouth daily      ondansetron (ZOFRAN-ODT) 4 mg disintegrating tablet DISSOLVE ONE TABLET IN MOUTH EVERY EIGHT HOURS AS NEEDED for nausea and vomiting  30 tablet 0    oxyCODONE-acetaminophen (PERCOCET) 5-325 mg per tablet Take 1 tablet by mouth every 6 (six) hours as needed for moderate painMax Daily Amount: 4 tablets 20 tablet 0    pancrelipase, Lip-Prot-Amyl, (Creon) 12,000 units capsule Take 12,000 units of lipase by mouth as needed for snacks 90 capsule 2    Pancrelipase, Lip-Prot-Amyl, (Creon) 68204 units CPEP Take 1 capsule (36,000 Units total) by mouth 3 (three) times a day before meals 90 capsule 3    predniSONE 20 mg tablet Take 1 tablet (20 mg total) by mouth daily 5 tablet 0    pregabalin (LYRICA) 100 mg capsule Take 1 capsule (100 mg total) by mouth 2 (two) times a day 90 capsule 3    rosuvastatin (CRESTOR) 40 MG tablet Take 1 tablet (40 mg total) by mouth daily 90 tablet 0    traZODone (DESYREL) 50 mg tablet TAKE ONE TABLET BY MOUTH NIGHTLY AT BEDTIME  30 tablet 0     No current facility-administered medications on file prior to encounter  Allergies   Allergen Reactions    Bee Venom Swelling       Anticoagulants: none    Labs:   CBC with diff:   Lab Results   Component Value Date    WBC 4 68 08/24/2020    HGB 11 5 (L) 08/24/2020    HCT 34 8 (L) 08/24/2020     (H) 08/24/2020    PLT 87 (L) 08/24/2020    MCH 33 5 08/24/2020    MCHC 33 0 08/24/2020    RDW 13 6 08/24/2020    MPV 11 2 08/24/2020    NRBC 0 08/24/2020     BMP/CMP:  Lab Results   Component Value Date     04/26/2017    K 3 9 08/24/2020    K 3 7 09/04/2019     08/24/2020     09/04/2019    CO2 28 08/24/2020    CO2 28 09/04/2019    BUN 11 08/24/2020    BUN 5 (L) 09/04/2019    CREATININE 0 65 08/24/2020    CREATININE 0 80 04/26/2017    CALCIUM 8 3 08/24/2020    CALCIUM 9 0 09/04/2019    AST 85 (H) 08/24/2020     (H) 09/04/2019    ALT 66 08/24/2020     (H) 09/04/2019    ALKPHOS 135 (H) 08/24/2020    ALKPHOS 266 (H) 09/04/2019    PROT 7 3 04/26/2017    BILITOT 1 8 (H) 04/26/2017    EGFR 116 08/24/2020   ,     Coags:   Lab Results   Component Value Date    PTT 36 05/14/2020    INR 1 27 (H) 05/14/2020   ,          Relevant imaging studies:   Reviewed  Directed physical examination:  CONSTITUTIONAL: The patient appeared well in no acute distress  NEUROLOGICAL: alert, awake, answering questions appropriately  PSYCHIATRIC: Affect normal   PULMONARY: No respiratory distress  CARDIAC: normal sinus rhythm on monitor, without tachycardia  GASTROINTESTINAL: abdomen was soft, round, nontender  EXTREMITIES: No cyanosis  Assessment/Plan: For CT guided splanchnic nerve root block  Sedation/Anesthesia plan:  Local sedation will be used as needed for procedure      Consent with alternatives to the procedure, risks and benefits have been explained and discussed with the patient/patient's family: yes

## 2021-01-24 DIAGNOSIS — M50.30 DEGENERATIVE DISC DISEASE, CERVICAL: Primary | ICD-10-CM

## 2021-01-25 ENCOUNTER — TELEPHONE (OUTPATIENT)
Dept: INTERVENTIONAL RADIOLOGY/VASCULAR | Facility: HOSPITAL | Age: 46
End: 2021-01-25

## 2021-01-25 NOTE — PRE-PROCEDURE INSTRUCTIONS
Informed patient of location, date and time of procedure  Arrive at [facility] on  February 1st at 0730  NPO at midnight  Take meds in morning with a sip of water  Hold insulin  Have someone drive her home due to sedation   Pt verbalized understanding

## 2021-01-26 ENCOUNTER — TELEPHONE (OUTPATIENT)
Dept: FAMILY MEDICINE CLINIC | Facility: CLINIC | Age: 46
End: 2021-01-26

## 2021-01-26 NOTE — TELEPHONE ENCOUNTER
----- Message from Jono 2 sent at 1/24/2021  8:37 PM EST -----  Please let pt know that he has degenerative disc disease  Needs to follow up with orthopedics  Referral placed

## 2021-01-27 DIAGNOSIS — I10 ESSENTIAL HYPERTENSION: ICD-10-CM

## 2021-01-27 RX ORDER — AMLODIPINE BESYLATE 5 MG/1
TABLET ORAL
Qty: 30 TABLET | Refills: 0 | Status: SHIPPED | OUTPATIENT
Start: 2021-01-27 | End: 2021-02-25

## 2021-01-27 RX ORDER — LISINOPRIL 20 MG/1
TABLET ORAL
Qty: 30 TABLET | Refills: 0 | Status: SHIPPED | OUTPATIENT
Start: 2021-01-27 | End: 2021-02-25

## 2021-02-01 ENCOUNTER — HOSPITAL ENCOUNTER (OUTPATIENT)
Dept: INTERVENTIONAL RADIOLOGY/VASCULAR | Facility: HOSPITAL | Age: 46
Discharge: HOME | End: 2021-02-01
Attending: STUDENT IN AN ORGANIZED HEALTH CARE EDUCATION/TRAINING PROGRAM
Payer: COMMERCIAL

## 2021-02-01 VITALS
WEIGHT: 193.78 LBS | HEIGHT: 68 IN | RESPIRATION RATE: 17 BRPM | BODY MASS INDEX: 29.37 KG/M2 | TEMPERATURE: 98.3 F | HEART RATE: 64 BPM | OXYGEN SATURATION: 96 % | SYSTOLIC BLOOD PRESSURE: 114 MMHG | DIASTOLIC BLOOD PRESSURE: 62 MMHG

## 2021-02-01 DIAGNOSIS — K85.90 ACUTE ON CHRONIC PANCREATITIS (HCC): ICD-10-CM

## 2021-02-01 DIAGNOSIS — K86.1 ACUTE ON CHRONIC PANCREATITIS (HCC): ICD-10-CM

## 2021-02-01 LAB — GLUCOSE SERPL-MCNC: 95 MG/DL (ref 65–140)

## 2021-02-01 PROCEDURE — 64461 PVB THORACIC SINGLE INJ SITE: CPT | Performed by: STUDENT IN AN ORGANIZED HEALTH CARE EDUCATION/TRAINING PROGRAM

## 2021-02-01 PROCEDURE — 99152 MOD SED SAME PHYS/QHP 5/>YRS: CPT | Performed by: STUDENT IN AN ORGANIZED HEALTH CARE EDUCATION/TRAINING PROGRAM

## 2021-02-01 PROCEDURE — 64462 PVB THORACIC 2ND+ INJ SITE: CPT | Performed by: STUDENT IN AN ORGANIZED HEALTH CARE EDUCATION/TRAINING PROGRAM

## 2021-02-01 PROCEDURE — 64483 NJX AA&/STRD TFRM EPI L/S 1: CPT

## 2021-02-01 PROCEDURE — 82948 REAGENT STRIP/BLOOD GLUCOSE: CPT

## 2021-02-01 RX ORDER — BUPIVACAINE HYDROCHLORIDE 5 MG/ML
INJECTION, SOLUTION PERINEURAL CODE/TRAUMA/SEDATION MEDICATION
Status: COMPLETED | OUTPATIENT
Start: 2021-02-01 | End: 2021-02-01

## 2021-02-01 RX ORDER — BUPIVACAINE HYDROCHLORIDE AND EPINEPHRINE 5; 5 MG/ML; UG/ML
20 INJECTION, SOLUTION EPIDURAL; INTRACAUDAL; PERINEURAL ONCE
Status: DISCONTINUED | OUTPATIENT
Start: 2021-02-01 | End: 2021-02-05 | Stop reason: HOSPADM

## 2021-02-01 RX ORDER — LIDOCAINE WITH 8.4% SOD BICARB 0.9%(10ML)
SYRINGE (ML) INJECTION CODE/TRAUMA/SEDATION MEDICATION
Status: COMPLETED | OUTPATIENT
Start: 2021-02-01 | End: 2021-02-01

## 2021-02-01 RX ORDER — MIDAZOLAM HYDROCHLORIDE 2 MG/2ML
INJECTION, SOLUTION INTRAMUSCULAR; INTRAVENOUS CODE/TRAUMA/SEDATION MEDICATION
Status: COMPLETED | OUTPATIENT
Start: 2021-02-01 | End: 2021-02-01

## 2021-02-01 RX ORDER — FENTANYL CITRATE 50 UG/ML
INJECTION, SOLUTION INTRAMUSCULAR; INTRAVENOUS CODE/TRAUMA/SEDATION MEDICATION
Status: COMPLETED | OUTPATIENT
Start: 2021-02-01 | End: 2021-02-01

## 2021-02-01 RX ADMIN — FENTANYL CITRATE 50 MCG: 50 INJECTION, SOLUTION INTRAMUSCULAR; INTRAVENOUS at 08:58

## 2021-02-01 RX ADMIN — MIDAZOLAM HYDROCHLORIDE 1 MG: 1 INJECTION, SOLUTION INTRAMUSCULAR; INTRAVENOUS at 08:58

## 2021-02-01 RX ADMIN — FENTANYL CITRATE 50 MCG: 50 INJECTION, SOLUTION INTRAMUSCULAR; INTRAVENOUS at 09:03

## 2021-02-01 RX ADMIN — FENTANYL CITRATE 50 MCG: 50 INJECTION, SOLUTION INTRAMUSCULAR; INTRAVENOUS at 09:14

## 2021-02-01 RX ADMIN — MIDAZOLAM HYDROCHLORIDE 1 MG: 1 INJECTION, SOLUTION INTRAMUSCULAR; INTRAVENOUS at 09:03

## 2021-02-01 RX ADMIN — IOHEXOL 5 ML: 350 INJECTION, SOLUTION INTRAVENOUS at 09:31

## 2021-02-01 RX ADMIN — Medication 5 ML: at 09:08

## 2021-02-01 RX ADMIN — Medication 5 ML: at 09:12

## 2021-02-01 RX ADMIN — BUPIVACAINE HYDROCHLORIDE 20 ML: 5 INJECTION, SOLUTION PERINEURAL at 09:15

## 2021-02-01 RX ADMIN — MIDAZOLAM HYDROCHLORIDE 1 MG: 1 INJECTION, SOLUTION INTRAMUSCULAR; INTRAVENOUS at 09:14

## 2021-02-01 NOTE — PROGRESS NOTES
Interventional Radiology Preprocedure Note    History/Indication for procedure:   Ramesh Sweeney is a 50 y o  male with a PMH of chronic pancreatitis with epigastric pain who presents for b/l splanchnic nerve root injection  Relevant past medical history:    Past Medical History:   Diagnosis Date    Asthma     Chronic pain disorder     Diabetes mellitus ([facility] [address] )     GERD (gastroesophageal reflux disease)     Hyperlipidemia     Liver abscess     Meniscus tear     left knee work injury last assessed 08/24/2016    Pancreatitis     Pneumonia      Patient Active Problem List   Diagnosis    Pancreatic lesion    Renal mass    Encounter for smoking cessation counseling    Leukocytosis    RBBB    Epigastric pain    Acute gastritis without hemorrhage    Generalized abdominal pain    Abnormal transaminases    Acute on chronic pancreatitis (HCC)    Leukopenia    Hypertriglyceridemia    Chronic pain syndrome    Esophagitis    Other chronic pancreatitis (HCC)    Chronic gastritis without bleeding    Uncomplicated opioid dependence ([facility] [address] )    Long-term current use of opiate analgesic    GERD (gastroesophageal reflux disease)    Hyponatremia    Healthcare maintenance    Elevated blood pressure reading    Upper abdominal pain    Hypertension    Pancreatitis, unspecified pancreatitis type    Abdominal pain, epigastric    Change in bowel habits    Musculoskeletal neck pain       /68   Pulse 82   Temp 97 5 °F (36 4 °C) (Temporal)   Resp 18   Ht 5' 8" (1 727 m)   Wt 87 9 kg (193 lb 12 6 oz)   SpO2 97%   BMI 29 46 kg/m²     Medications:    Inpatient Medications:     Scheduled Medications:      Infusions:  No current facility-administered medications for this encounter         PRN:      Outpatient Medications:  Current Outpatient Medications on File Prior to Encounter   Medication Sig Dispense Refill    amLODIPine (NORVASC) 5 mg tablet TAKE ONE TABLET BY MOUTH EVERY DAY  30 tablet 0    Choline Fenofibrate (FENOFIBRIC ACID) 135 MG CPDR Take 1 capsule (135 mg total) by mouth daily 90 capsule 2    diclofenac sodium (VOLTAREN) 1 % diclofenac 1 % topical gel   APPLY 2 GRAMS TO THE AFFECTED AREA(S) BY TOPICAL ROUTE 4 TIMES PER DAY      esomeprazole (NexIUM) 40 MG capsule Take 1 capsule (40 mg total) by mouth 2 (two) times a day before meals 60 capsule 5    famotidine (PEPCID) 40 MG tablet Take 40 mg by mouth 2 (two) times a day      insulin degludec Jadene Metro FlexTouch) 100 units/mL injection pen 20 units subcut in hs      lisinopril (ZESTRIL) 20 mg tablet TAKE ONE TABLET BY MOUTH EVERY DAY  30 tablet 0    omega-3-acid ethyl esters (LOVAZA) 1 g capsule TAKE TWO CAPSULES BY MOUTH TWICE DAILY  360 capsule 0    omeprazole (PriLOSEC) 20 mg delayed release capsule Take 20 mg by mouth daily      ondansetron (ZOFRAN-ODT) 4 mg disintegrating tablet DISSOLVE ONE TABLET IN MOUTH EVERY EIGHT HOURS AS NEEDED for nausea and vomiting  30 tablet 0    pancrelipase, Lip-Prot-Amyl, (Creon) 12,000 units capsule Take 12,000 units of lipase by mouth as needed for snacks 90 capsule 2    pregabalin (LYRICA) 100 mg capsule Take 1 capsule (100 mg total) by mouth 2 (two) times a day 90 capsule 3    rosuvastatin (CRESTOR) 40 MG tablet Take 1 tablet (40 mg total) by mouth daily 90 tablet 0    traZODone (DESYREL) 50 mg tablet TAKE ONE TABLET BY MOUTH NIGHTLY AT BEDTIME  30 tablet 0    EPIPEN 2-ARIEL 0 3 MG/0 3ML SOAJ Inject one syringe (0 3mg) intramuscularly once as directed 2 each 0    Lidocaine-Menthol 4-1 % GEL NuLido 4 %-1 % topical gel   Apply to affected area 2-4 times a day as needed for pain      oxyCODONE-acetaminophen (PERCOCET) 5-325 mg per tablet Take 1 tablet by mouth every 6 (six) hours as needed for moderate painMax Daily Amount: 4 tablets 20 tablet 0    Pancrelipase, Lip-Prot-Amyl, (Creon) 09020 units CPEP Take 1 capsule (36,000 Units total) by mouth 3 (three) times a day before meals 90 capsule 3    predniSONE 20 mg tablet Take 1 tablet (20 mg total) by mouth daily 5 tablet 0     No current facility-administered medications on file prior to encounter  Allergies   Allergen Reactions    Bee Venom Swelling       Anticoagulants: none    ASA classification: ASA 3 - Patient with moderate systemic disease with functional limitations    Airway Assessment: II (hard and soft palate, upper portion of tonsils anduvula visible)    Relevant family history: None    Relevant review of systems: None    Prior sedation/anesthesia: yes    Can the patient lie flat? Yes     NPO Status: yes    Labs:   CBC with diff:   Lab Results   Component Value Date    WBC 4 68 08/24/2020    HGB 11 5 (L) 08/24/2020    HCT 34 8 (L) 08/24/2020     (H) 08/24/2020    PLT 87 (L) 08/24/2020    MCH 33 5 08/24/2020    MCHC 33 0 08/24/2020    RDW 13 6 08/24/2020    MPV 11 2 08/24/2020    NRBC 0 08/24/2020     BMP/CMP:  Lab Results   Component Value Date     04/26/2017    K 3 9 08/24/2020    K 3 7 09/04/2019     08/24/2020     09/04/2019    CO2 28 08/24/2020    CO2 28 09/04/2019    BUN 11 08/24/2020    BUN 5 (L) 09/04/2019    CREATININE 0 65 08/24/2020    CREATININE 0 80 04/26/2017    CALCIUM 8 3 08/24/2020    CALCIUM 9 0 09/04/2019    AST 85 (H) 08/24/2020     (H) 09/04/2019    ALT 66 08/24/2020     (H) 09/04/2019    ALKPHOS 135 (H) 08/24/2020    ALKPHOS 266 (H) 09/04/2019    PROT 7 3 04/26/2017    BILITOT 1 8 (H) 04/26/2017    EGFR 116 08/24/2020   ,     Coags:   Lab Results   Component Value Date    PTT 36 05/14/2020    INR 1 27 (H) 05/14/2020   ,          Relevant imaging studies:   Reviewed  Directed physical examination:  CONSTITUTIONAL: The patient appeared well in no acute distress  NEUROLOGICAL: alert, awake, answering questions appropriately  PSYCHIATRIC: Affect normal   PULMONARY: No respiratory distress  CARDIAC: normal sinus rhythm on monitor, without tachycardia    GASTROINTESTINAL: abdomen was soft, round, nontender  EXTREMITIES: No cyanosis  Assessment/Plan: For b/l splanchnic nerve root injection  Sedation/Anesthesia plan: Moderate sedation will be used as needed for procedure  Consent with alternatives to the procedure, risks and benefits have been explained and discussed with the patient/patient's family: yes

## 2021-02-01 NOTE — BRIEF OP NOTE (RAD/CATH)
INTERVENTIONAL RADIOLOGY PROCEDURE NOTE    Date: 2/1/2021    Procedure: IR OTHER    Preoperative diagnosis:   1  Acute on chronic pancreatitis (HCC)         Postoperative diagnosis: Same  Surgeon: Jarrell Lloyd MD     Assistant: None  No qualified resident was available  Blood loss: 2 ml    Specimens: None  Findings:   B/l splanchnic nerve root injection  Complications: None immediate      Anesthesia: conscious sedation

## 2021-02-01 NOTE — DISCHARGE INSTRUCTIONS
Peripheral Nerve Block   AMBULATORY CARE:   What you need to know about a peripheral nerve block:  A peripheral nerve block is a type of regional anesthesia  Medicine is given as an injection to numb part of your body  The arm and leg are the most common areas for a peripheral nerve block  Other areas include the head, neck, back, abdomen, and hip  You may need a peripheral nerve block during surgery or a procedure  You may have less pain after surgery, and be able to go home sooner  Peripheral nerve blocks can also be used to treat severe pain  The pain relief usually lasts 1 to 2 weeks  How to prepare for a peripheral nerve block:  Tell your healthcare provider if you or anyone in your family has ever had problems with anesthesia  You may need to arrange to have someone drive you home after your nerve block  Your healthcare provider will talk to you about how to prepare for your nerve block  He may tell you not to eat or drink anything after midnight on the day of your nerve block  Tell him about all the medicines you take, including vitamins and herbs  He will tell you which medicines to take or not take on the day of your nerve block  What will happen during a peripheral nerve block:   · You may receive medicine in your IV to make you sleepy and more relaxed  Your healthcare provider may use an ultrasound or nerve stimulator to find the nerves to numb  An ultrasound uses sound waves to show pictures of the body area on a monitor  A nerve stimulator uses a small electrical current that causes your muscle to twitch when the nerve is found  · Once the nerves are found, a needle is put through your skin into the tissue near the nerve  Anesthesia medicine is given through the needle into tissues around the nerve to be numbed  Medicine to reduce bleeding may also be given  There may be some discomfort when the numbing medicine is given  Your body part may feel tingly before it becomes numb      What will happen after a peripheral nerve block: You will be monitored until the numbness goes away  It may take a while before you can move the area that was numbed  If you are staying in the hospital, you may be taken to your room  If you will go home, you may be allowed to leave once you have feeling in the numbed area  You will be monitored until the numbness goes away  You may not be able to feel pain in the peripheral nerve block area for about 4 to 18 hours  Until you have full feeling back, you are at risk for falls and injury  Be careful not to bump the numbed body part  Risks of a peripheral nerve block: You may have bruising, bleeding, pain, or get an infection in the numbed area  You may have a hoarse voice, or blurry vision and a droopy eye  This is usually temporary  You may have an allergic reaction to the anesthesia  If the medicine enters a vein or you get too much, you may get headaches and have muscle twitching  You could also have a seizure or a heart attack  The peripheral nerve block may cause nerve damage, chronic pain, or loss of function of the body part  A peripheral nerve block in your upper body may damage your lungs  Call 911 if:   · You have any of the following signs of a heart attack:      ? Squeezing, pressure, or pain in your chest    ? You may  also have any of the following:     § Discomfort or pain in your back, neck, jaw, stomach, or arm    § Shortness of breath    § Nausea or vomiting    § Lightheadedness or a sudden cold sweat    · You have trouble breathing  · You have a seizure  Seek care immediately if:   · You develop swelling or hives  · You have a severe headache or muscle twitching that does not go away  · You have nausea or are vomiting  Contact your healthcare provider if:   · You have a fever  · You have increased numbness or pain in the numbed area  · You feel faint  · You have questions or concerns about peripheral nerve blocks      Self-care: · Change positions often  This will keep you from putting pressure on the area  · Prop the area or surround it with pillows while you sleep  This will keep you from rolling over onto the area while you sleep  It will also keep you from putting pressure on the area  · Protect the area from injury  You may not be able to feel pain in the peripheral nerve block area for about 4 to 18 hours  Until you have full feeling back, you are at risk for falls and injury  Be careful not to bump the numbed body part  Move slowly and carefully  Do not touch anything that might be hot  You might not feel the skin burn until it is severe  Protect the area in hot and cold weather  Follow up with your healthcare provider as directed:  Write down your questions so you remember to ask them during your visits  © Copyright [address] Information is for End User's use only and may not be sold, redistributed or otherwise used for commercial purposes  All illustrations and images included in CareNotes® are the copyrighted property of A [employer]  or [facility] [employer]   The above information is an  only  It is not intended as medical advice for individual conditions or treatments  Talk to your doctor, nurse or pharmacist before following any medical regimen to see if it is safe and effective for you

## 2021-02-04 DIAGNOSIS — J45.909 UNCOMPLICATED ASTHMA, UNSPECIFIED ASTHMA SEVERITY, UNSPECIFIED WHETHER PERSISTENT: ICD-10-CM

## 2021-02-07 RX ORDER — ALBUTEROL SULFATE 90 UG/1
AEROSOL, METERED RESPIRATORY (INHALATION)
Qty: 54 G | Refills: 0 | Status: SHIPPED | OUTPATIENT
Start: 2021-02-07 | End: 2021-12-20 | Stop reason: SDUPTHER

## 2021-02-15 DIAGNOSIS — E78.2 MIXED HYPERLIPIDEMIA: ICD-10-CM

## 2021-02-15 DIAGNOSIS — E78.1 HYPERTRIGLYCERIDEMIA: ICD-10-CM

## 2021-02-15 RX ORDER — FENOFIBRIC ACID 135 MG/1
1 CAPSULE, DELAYED RELEASE ORAL DAILY
Qty: 90 CAPSULE | Refills: 2 | Status: SHIPPED | OUTPATIENT
Start: 2021-02-15 | End: 2021-11-16

## 2021-02-25 ENCOUNTER — OFFICE VISIT (OUTPATIENT)
Dept: OBGYN CLINIC | Facility: CLINIC | Age: 46
End: 2021-02-25
Payer: COMMERCIAL

## 2021-02-25 VITALS
DIASTOLIC BLOOD PRESSURE: 82 MMHG | WEIGHT: 193 LBS | HEART RATE: 78 BPM | SYSTOLIC BLOOD PRESSURE: 136 MMHG | BODY MASS INDEX: 29.25 KG/M2 | HEIGHT: 68 IN

## 2021-02-25 DIAGNOSIS — S29.012A STRAIN OF RHOMBOID MUSCLE, INITIAL ENCOUNTER: ICD-10-CM

## 2021-02-25 DIAGNOSIS — I10 ESSENTIAL HYPERTENSION: ICD-10-CM

## 2021-02-25 DIAGNOSIS — M50.30 DEGENERATIVE DISC DISEASE, CERVICAL: Primary | ICD-10-CM

## 2021-02-25 DIAGNOSIS — M62.838 TRAPEZIUS MUSCLE SPASM: ICD-10-CM

## 2021-02-25 DIAGNOSIS — M47.22 OSTEOARTHRITIS OF SPINE WITH RADICULOPATHY, CERVICAL REGION: ICD-10-CM

## 2021-02-25 DIAGNOSIS — M54.12 RADICULOPATHY, CERVICAL REGION: ICD-10-CM

## 2021-02-25 PROCEDURE — 99203 OFFICE O/P NEW LOW 30 MIN: CPT | Performed by: ORTHOPAEDIC SURGERY

## 2021-02-25 RX ORDER — CYCLOBENZAPRINE HCL 5 MG
5 TABLET ORAL 3 TIMES DAILY PRN
Qty: 14 TABLET | Refills: 0 | Status: CANCELLED | OUTPATIENT
Start: 2021-02-25 | End: 2021-03-11

## 2021-02-25 RX ORDER — AMLODIPINE BESYLATE 5 MG/1
TABLET ORAL
Qty: 30 TABLET | Refills: 0 | Status: SHIPPED | OUTPATIENT
Start: 2021-02-25 | End: 2021-03-30

## 2021-02-25 RX ORDER — LISINOPRIL 20 MG/1
TABLET ORAL
Qty: 30 TABLET | Refills: 0 | Status: SHIPPED | OUTPATIENT
Start: 2021-02-25 | End: 2021-03-30
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
## 2021-03-06 LAB
A1AT SERPL-MCNC: 159 MG/DL (ref 83–199)
CERULOPLASMIN SERPL-MCNC: 27 MG/DL (ref 18–36)
FERRITIN SERPL-MCNC: 873 NG/ML (ref 38–380)
IRON SATN MFR SERPL: 41 % (CALC) (ref 20–48)
IRON SERPL-MCNC: 141 MCG/DL (ref 50–180)
TIBC SERPL-MCNC: 347 MCG/DL (CALC) (ref 250–425)

## 2021-03-08 ENCOUNTER — TELEPHONE (OUTPATIENT)
Dept: GASTROENTEROLOGY | Facility: CLINIC | Age: 46
End: 2021-03-08

## 2021-03-08 DIAGNOSIS — G89.4 CHRONIC PAIN SYNDROME: ICD-10-CM

## 2021-03-08 DIAGNOSIS — K74.60 CIRRHOSIS OF LIVER WITHOUT ASCITES, UNSPECIFIED HEPATIC CIRRHOSIS TYPE (HCC): ICD-10-CM

## 2021-03-08 DIAGNOSIS — R79.89 ELEVATED FERRITIN: Primary | ICD-10-CM

## 2021-03-08 NOTE — TELEPHONE ENCOUNTER
----- Message from Carlie Glez sent at 3/7/2021  7:44 PM EST -----  Regarding: Test Results Question  Contact: [phone]  I am sorry it took sooo long for the blood work  Been really busy at work  I did have it done and they sent it to a Gianna Jackman  I guess fat fingers on the keyboard  Tge results are in and a alit concerning to me

## 2021-03-08 NOTE — TELEPHONE ENCOUNTER
I responded to him in [employer]  Please mail him the bloodwork test for hemochromatosis, I ordered this  Thanks!

## 2021-03-09 RX ORDER — PREGABALIN 100 MG/1
CAPSULE ORAL
Qty: 90 CAPSULE | Refills: 0 | Status: SHIPPED | OUTPATIENT
Start: 2021-03-09 | End: 2021-04-29

## 2021-03-11 ENCOUNTER — HOSPITAL ENCOUNTER (OUTPATIENT)
Dept: MRI IMAGING | Facility: HOSPITAL | Age: 46
Discharge: HOME | End: 2021-03-11
Attending: ORTHOPAEDIC SURGERY
Payer: COMMERCIAL

## 2021-03-11 DIAGNOSIS — S29.012A STRAIN OF RHOMBOID MUSCLE, INITIAL ENCOUNTER: ICD-10-CM

## 2021-03-11 DIAGNOSIS — M62.838 TRAPEZIUS MUSCLE SPASM: ICD-10-CM

## 2021-03-11 DIAGNOSIS — M50.30 DEGENERATIVE DISC DISEASE, CERVICAL: ICD-10-CM

## 2021-03-11 PROCEDURE — 72141 MRI NECK SPINE W/O DYE: CPT

## 2021-03-11 PROCEDURE — G1004 CDSM NDSC: HCPCS

## 2021-03-12 ENCOUNTER — HOSPITAL ENCOUNTER (EMERGENCY)
Facility: HOSPITAL | Age: 46
Discharge: HOME | End: 2021-03-13
Attending: EMERGENCY MEDICINE
Payer: COMMERCIAL

## 2021-03-12 ENCOUNTER — APPOINTMENT (EMERGENCY)
Dept: CT IMAGING | Facility: HOSPITAL | Age: 46
End: 2021-03-12
Payer: COMMERCIAL

## 2021-03-12 DIAGNOSIS — K29.70 GASTRITIS: ICD-10-CM

## 2021-03-12 DIAGNOSIS — R10.9 ABDOMINAL PAIN: Primary | ICD-10-CM

## 2021-03-12 DIAGNOSIS — G47.00 INSOMNIA, UNSPECIFIED TYPE: ICD-10-CM

## 2021-03-12 LAB
ALBUMIN SERPL BCP-MCNC: 3.6 G/DL (ref 3.5–5)
ALP SERPL-CCNC: 121 U/L (ref 46–116)
ALT SERPL W P-5'-P-CCNC: 42 U/L (ref 12–78)
ANION GAP SERPL CALCULATED.3IONS-SCNC: 10 MMOL/L (ref 4–13)
AST SERPL W P-5'-P-CCNC: 27 U/L (ref 5–45)
BASOPHILS # BLD AUTO: 0.02 THOUSANDS/ΜL (ref 0–0.1)
BASOPHILS NFR BLD AUTO: 0 % (ref 0–1)
BILIRUB DIRECT SERPL-MCNC: 0.15 MG/DL (ref 0–0.2)
BILIRUB SERPL-MCNC: 0.37 MG/DL (ref 0.2–1)
BUN SERPL-MCNC: 15 MG/DL (ref 5–25)
CALCIUM SERPL-MCNC: 8.6 MG/DL (ref 8.3–10.1)
CHLORIDE SERPL-SCNC: 108 MMOL/L (ref 100–108)
CO2 SERPL-SCNC: 27 MMOL/L (ref 21–32)
CREAT SERPL-MCNC: 0.64 MG/DL (ref 0.6–1.3)
EOSINOPHIL # BLD AUTO: 0.12 THOUSAND/ΜL (ref 0–0.61)
EOSINOPHIL NFR BLD AUTO: 3 % (ref 0–6)
ERYTHROCYTE [DISTWIDTH] IN BLOOD BY AUTOMATED COUNT: 13.4 % (ref 11.6–15.1)
GFR SERPL CREATININE-BSD FRML MDRD: 116 ML/MIN/1.73SQ M
GLUCOSE SERPL-MCNC: 104 MG/DL (ref 65–140)
HCT VFR BLD AUTO: 38.4 % (ref 36.5–49.3)
HGB BLD-MCNC: 12.9 G/DL (ref 12–17)
LACTATE SERPL-SCNC: 0.7 MMOL/L (ref 0.5–2)
LIPASE SERPL-CCNC: 104 U/L (ref 73–393)
LYMPHOCYTES # BLD AUTO: 1.92 THOUSANDS/ΜL (ref 0.6–4.47)
LYMPHOCYTES NFR BLD AUTO: 39 % (ref 14–44)
MCH RBC QN AUTO: 33.3 PG (ref 26.8–34.3)
MCHC RBC AUTO-ENTMCNC: 33.6 G/DL (ref 31.4–37.4)
MCV RBC AUTO: 99 FL (ref 82–98)
MONOCYTES # BLD AUTO: 0.41 THOUSAND/ΜL (ref 0.17–1.22)
MONOCYTES NFR BLD AUTO: 8 % (ref 4–12)
NEUTROPHILS # BLD AUTO: 2.4 THOUSANDS/ΜL (ref 1.85–7.62)
NEUTS SEG NFR BLD AUTO: 50 % (ref 43–75)
PLATELET # BLD AUTO: 106 THOUSANDS/UL (ref 149–390)
PMV BLD AUTO: 11 FL (ref 8.9–12.7)
POTASSIUM SERPL-SCNC: 3.8 MMOL/L (ref 3.5–5.3)
PROT SERPL-MCNC: 6.7 G/DL (ref 6.4–8.2)
RBC # BLD AUTO: 3.87 MILLION/UL (ref 3.88–5.62)
SODIUM SERPL-SCNC: 145 MMOL/L (ref 136–145)
TROPONIN I SERPL-MCNC: <0.02 NG/ML
WBC # BLD AUTO: 4.87 THOUSAND/UL (ref 4.31–10.16)

## 2021-03-12 PROCEDURE — 74177 CT ABD & PELVIS W/CONTRAST: CPT

## 2021-03-12 PROCEDURE — 80048 BASIC METABOLIC PNL TOTAL CA: CPT | Performed by: EMERGENCY MEDICINE

## 2021-03-12 PROCEDURE — 84484 ASSAY OF TROPONIN QUANT: CPT | Performed by: EMERGENCY MEDICINE

## 2021-03-12 PROCEDURE — 36415 COLL VENOUS BLD VENIPUNCTURE: CPT | Performed by: EMERGENCY MEDICINE

## 2021-03-12 PROCEDURE — 96366 THER/PROPH/DIAG IV INF ADDON: CPT

## 2021-03-12 PROCEDURE — 93005 ELECTROCARDIOGRAM TRACING: CPT

## 2021-03-12 PROCEDURE — 96365 THER/PROPH/DIAG IV INF INIT: CPT

## 2021-03-12 PROCEDURE — 99284 EMERGENCY DEPT VISIT MOD MDM: CPT

## 2021-03-12 PROCEDURE — 83690 ASSAY OF LIPASE: CPT | Performed by: EMERGENCY MEDICINE

## 2021-03-12 PROCEDURE — 96375 TX/PRO/DX INJ NEW DRUG ADDON: CPT

## 2021-03-12 PROCEDURE — 99284 EMERGENCY DEPT VISIT MOD MDM: CPT | Performed by: EMERGENCY MEDICINE

## 2021-03-12 PROCEDURE — 80076 HEPATIC FUNCTION PANEL: CPT | Performed by: EMERGENCY MEDICINE

## 2021-03-12 PROCEDURE — 83605 ASSAY OF LACTIC ACID: CPT | Performed by: EMERGENCY MEDICINE

## 2021-03-12 PROCEDURE — 85025 COMPLETE CBC W/AUTO DIFF WBC: CPT | Performed by: EMERGENCY MEDICINE

## 2021-03-12 RX ORDER — ONDANSETRON 2 MG/ML
4 INJECTION INTRAMUSCULAR; INTRAVENOUS ONCE
Status: COMPLETED | OUTPATIENT
Start: 2021-03-12 | End: 2021-03-12

## 2021-03-12 RX ORDER — KETOROLAC TROMETHAMINE 30 MG/ML
15 INJECTION, SOLUTION INTRAMUSCULAR; INTRAVENOUS ONCE
Status: COMPLETED | OUTPATIENT
Start: 2021-03-12 | End: 2021-03-12

## 2021-03-12 RX ADMIN — SODIUM CHLORIDE, SODIUM LACTATE, POTASSIUM CHLORIDE, AND CALCIUM CHLORIDE 1000 ML: .6; .31; .03; .02 INJECTION, SOLUTION INTRAVENOUS at 22:33

## 2021-03-12 RX ADMIN — ONDANSETRON 4 MG: 2 INJECTION INTRAMUSCULAR; INTRAVENOUS at 22:34

## 2021-03-12 RX ADMIN — KETOROLAC TROMETHAMINE 15 MG: 30 INJECTION, SOLUTION INTRAMUSCULAR; INTRAVENOUS at 22:35

## 2021-03-12 RX ADMIN — IOHEXOL 100 ML: 350 INJECTION, SOLUTION INTRAVENOUS at 23:22

## 2021-03-12 NOTE — Clinical Note
Alisia Barroso was seen and treated in our emergency department on 3/12/2021  Diagnosis:     Bedford Lennox  may return to work on return date  He may return on this date: 03/15/2021         If you have any questions or concerns, please don't hesitate to call        Joby Sims MD    ______________________________           _______________          _______________  Hospital Representative                              Date                                Time

## 2021-03-12 NOTE — RESULT ENCOUNTER NOTE
Very unlikely to have hemochromatosis by these numbers    Please forward to advanced practitioner Makenna as I am not in charge of the patient's care

## 2021-03-13 ENCOUNTER — HOSPITAL ENCOUNTER (EMERGENCY)
Facility: HOSPITAL | Age: 46
Discharge: HOME | End: 2021-03-13
Attending: EMERGENCY MEDICINE | Admitting: EMERGENCY MEDICINE
Payer: COMMERCIAL

## 2021-03-13 VITALS
HEART RATE: 52 BPM | TEMPERATURE: 97.4 F | SYSTOLIC BLOOD PRESSURE: 124 MMHG | RESPIRATION RATE: 18 BRPM | DIASTOLIC BLOOD PRESSURE: 77 MMHG | OXYGEN SATURATION: 96 %

## 2021-03-13 VITALS
RESPIRATION RATE: 18 BRPM | HEART RATE: 72 BPM | SYSTOLIC BLOOD PRESSURE: 140 MMHG | DIASTOLIC BLOOD PRESSURE: 85 MMHG | OXYGEN SATURATION: 99 % | WEIGHT: 192.9 LBS | BODY MASS INDEX: 29.33 KG/M2 | TEMPERATURE: 97.7 F

## 2021-03-13 DIAGNOSIS — R16.1 SPLENOMEGALY: ICD-10-CM

## 2021-03-13 DIAGNOSIS — R16.0 ENLARGED LIVER: ICD-10-CM

## 2021-03-13 DIAGNOSIS — K31.89 GASTRIC WALL THICKENING: ICD-10-CM

## 2021-03-13 DIAGNOSIS — K76.0 FATTY LIVER: ICD-10-CM

## 2021-03-13 DIAGNOSIS — R10.13 ABDOMINAL PAIN, EPIGASTRIC: ICD-10-CM

## 2021-03-13 DIAGNOSIS — R10.9 ABDOMINAL PAIN, UNSPECIFIED ABDOMINAL LOCATION: Primary | ICD-10-CM

## 2021-03-13 LAB
ALBUMIN SERPL BCP-MCNC: 3.4 G/DL (ref 3.5–5)
ALP SERPL-CCNC: 83 U/L (ref 46–116)
ALT SERPL W P-5'-P-CCNC: 39 U/L (ref 12–78)
ANION GAP SERPL CALCULATED.3IONS-SCNC: 7 MMOL/L (ref 4–13)
AST SERPL W P-5'-P-CCNC: 25 U/L (ref 5–45)
BASOPHILS # BLD AUTO: 0.03 THOUSANDS/ΜL (ref 0–0.1)
BASOPHILS NFR BLD AUTO: 1 % (ref 0–1)
BILIRUB SERPL-MCNC: 0.67 MG/DL (ref 0.2–1)
BILIRUB UR QL STRIP: NEGATIVE
BUN SERPL-MCNC: 11 MG/DL (ref 5–25)
CALCIUM ALBUM COR SERPL-MCNC: 8.6 MG/DL (ref 8.3–10.1)
CALCIUM SERPL-MCNC: 8.1 MG/DL (ref 8.3–10.1)
CHLORIDE SERPL-SCNC: 108 MMOL/L (ref 100–108)
CLARITY UR: CLEAR
CO2 SERPL-SCNC: 27 MMOL/L (ref 21–32)
COLOR UR: YELLOW
CREAT SERPL-MCNC: 0.61 MG/DL (ref 0.6–1.3)
EOSINOPHIL # BLD AUTO: 0.1 THOUSAND/ΜL (ref 0–0.61)
EOSINOPHIL NFR BLD AUTO: 2 % (ref 0–6)
ERYTHROCYTE [DISTWIDTH] IN BLOOD BY AUTOMATED COUNT: 13 % (ref 11.6–15.1)
GFR SERPL CREATININE-BSD FRML MDRD: 118 ML/MIN/1.73SQ M
GLUCOSE SERPL-MCNC: 82 MG/DL (ref 65–140)
GLUCOSE UR STRIP-MCNC: NEGATIVE MG/DL
HCT VFR BLD AUTO: 42.9 % (ref 36.5–49.3)
HGB BLD-MCNC: 14.1 G/DL (ref 12–17)
HGB UR QL STRIP.AUTO: NEGATIVE
IMM GRANULOCYTES # BLD AUTO: 0.02 THOUSAND/UL (ref 0–0.2)
IMM GRANULOCYTES NFR BLD AUTO: 0 % (ref 0–2)
KETONES UR STRIP-MCNC: NEGATIVE MG/DL
LEUKOCYTE ESTERASE UR QL STRIP: NEGATIVE
LIPASE SERPL-CCNC: 50 U/L (ref 73–393)
LYMPHOCYTES # BLD AUTO: 1.56 THOUSANDS/ΜL (ref 0.6–4.47)
LYMPHOCYTES NFR BLD AUTO: 31 % (ref 14–44)
MCH RBC QN AUTO: 32.5 PG (ref 26.8–34.3)
MCHC RBC AUTO-ENTMCNC: 32.9 G/DL (ref 31.4–37.4)
MCV RBC AUTO: 99 FL (ref 82–98)
MONOCYTES # BLD AUTO: 0.5 THOUSAND/ΜL (ref 0.17–1.22)
MONOCYTES NFR BLD AUTO: 10 % (ref 4–12)
NEUTROPHILS # BLD AUTO: 2.88 THOUSANDS/ΜL (ref 1.85–7.62)
NEUTS SEG NFR BLD AUTO: 56 % (ref 43–75)
NITRITE UR QL STRIP: NEGATIVE
NRBC BLD AUTO-RTO: 0 /100 WBCS
PH UR STRIP.AUTO: 7 [PH]
PLATELET # BLD AUTO: 119 THOUSANDS/UL (ref 149–390)
PMV BLD AUTO: 11.8 FL (ref 8.9–12.7)
POTASSIUM SERPL-SCNC: 3.5 MMOL/L (ref 3.5–5.3)
PROT SERPL-MCNC: 6 G/DL (ref 6.4–8.2)
PROT UR STRIP-MCNC: NEGATIVE MG/DL
RBC # BLD AUTO: 4.34 MILLION/UL (ref 3.88–5.62)
SODIUM SERPL-SCNC: 142 MMOL/L (ref 136–145)
SP GR UR STRIP.AUTO: 1.02 (ref 1–1.03)
UROBILINOGEN UR QL STRIP.AUTO: 0.2 E.U./DL
WBC # BLD AUTO: 5.09 THOUSAND/UL (ref 4.31–10.16)

## 2021-03-13 PROCEDURE — 83690 ASSAY OF LIPASE: CPT | Performed by: EMERGENCY MEDICINE

## 2021-03-13 PROCEDURE — 99284 EMERGENCY DEPT VISIT MOD MDM: CPT

## 2021-03-13 PROCEDURE — 96374 THER/PROPH/DIAG INJ IV PUSH: CPT

## 2021-03-13 PROCEDURE — 36415 COLL VENOUS BLD VENIPUNCTURE: CPT | Performed by: EMERGENCY MEDICINE

## 2021-03-13 PROCEDURE — 85025 COMPLETE CBC W/AUTO DIFF WBC: CPT | Performed by: EMERGENCY MEDICINE

## 2021-03-13 PROCEDURE — 81003 URINALYSIS AUTO W/O SCOPE: CPT | Performed by: EMERGENCY MEDICINE

## 2021-03-13 PROCEDURE — 96375 TX/PRO/DX INJ NEW DRUG ADDON: CPT

## 2021-03-13 PROCEDURE — 99284 EMERGENCY DEPT VISIT MOD MDM: CPT | Performed by: EMERGENCY MEDICINE

## 2021-03-13 PROCEDURE — 96361 HYDRATE IV INFUSION ADD-ON: CPT

## 2021-03-13 PROCEDURE — 80053 COMPREHEN METABOLIC PANEL: CPT | Performed by: EMERGENCY MEDICINE

## 2021-03-13 RX ORDER — DICYCLOMINE HCL 20 MG
20 TABLET ORAL 2 TIMES DAILY
Qty: 20 TABLET | Refills: 0 | Status: SHIPPED | OUTPATIENT
Start: 2021-03-13 | End: 2021-03-13 | Stop reason: RX

## 2021-03-13 RX ORDER — DICYCLOMINE HCL 20 MG
20 TABLET ORAL 2 TIMES DAILY
Qty: 20 TABLET | Refills: 0 | OUTPATIENT
Start: 2021-03-13 | End: 2021-09-14

## 2021-03-13 RX ORDER — KETOROLAC TROMETHAMINE 30 MG/ML
15 INJECTION, SOLUTION INTRAMUSCULAR; INTRAVENOUS ONCE
Status: COMPLETED | OUTPATIENT
Start: 2021-03-13 | End: 2021-03-13

## 2021-03-13 RX ORDER — OXYCODONE HYDROCHLORIDE 5 MG/1
5 TABLET ORAL EVERY 4 HOURS PRN
Qty: 5 TABLET | Refills: 0 | Status: SHIPPED | OUTPATIENT
Start: 2021-03-13 | End: 2021-03-15

## 2021-03-13 RX ORDER — OXYCODONE HYDROCHLORIDE 5 MG/1
5 TABLET ORAL EVERY 4 HOURS PRN
Qty: 5 TABLET | Refills: 0 | Status: SHIPPED | OUTPATIENT
Start: 2021-03-13 | End: 2021-03-13 | Stop reason: RX

## 2021-03-13 RX ORDER — MORPHINE SULFATE 4 MG/ML
4 INJECTION, SOLUTION INTRAMUSCULAR; INTRAVENOUS ONCE
Status: COMPLETED | OUTPATIENT
Start: 2021-03-13 | End: 2021-03-13

## 2021-03-13 RX ORDER — MAGNESIUM HYDROXIDE/ALUMINUM HYDROXICE/SIMETHICONE 120; 1200; 1200 MG/30ML; MG/30ML; MG/30ML
30 SUSPENSION ORAL ONCE
Status: COMPLETED | OUTPATIENT
Start: 2021-03-13 | End: 2021-03-13

## 2021-03-13 RX ORDER — DICYCLOMINE HCL 20 MG
20 TABLET ORAL ONCE
Status: COMPLETED | OUTPATIENT
Start: 2021-03-13 | End: 2021-03-13

## 2021-03-13 RX ORDER — LIDOCAINE HYDROCHLORIDE 20 MG/ML
15 SOLUTION OROPHARYNGEAL ONCE
Status: COMPLETED | OUTPATIENT
Start: 2021-03-13 | End: 2021-03-13

## 2021-03-13 RX ADMIN — ALUMINA, MAGNESIA, AND SIMETHICONE ORAL SUSPENSION REGULAR STRENGTH 30 ML: 1200; 1200; 120 SUSPENSION ORAL at 15:59

## 2021-03-13 RX ADMIN — KETOROLAC TROMETHAMINE 15 MG: 30 INJECTION, SOLUTION INTRAMUSCULAR; INTRAVENOUS at 15:58

## 2021-03-13 RX ADMIN — SODIUM CHLORIDE 1000 ML: 0.9 INJECTION, SOLUTION INTRAVENOUS at 15:58

## 2021-03-13 RX ADMIN — LIDOCAINE HYDROCHLORIDE 15 ML: 20 SOLUTION ORAL; TOPICAL at 15:59

## 2021-03-13 RX ADMIN — MORPHINE SULFATE 4 MG: 4 INJECTION INTRAVENOUS at 17:51

## 2021-03-13 RX ADMIN — DICYCLOMINE HYDROCHLORIDE 20 MG: 20 TABLET ORAL at 15:58

## 2021-03-13 NOTE — ED PROVIDER NOTES
History  Chief Complaint   Patient presents with    Abdominal Pain     RLQ pain for days  Denies fever or n/v/d      53-year-old male presented complaining of a three day history of abdominal pain  He states that the pain was relatively mild at 1st and primary located in his right lower quadrant  Initially pain was intermittent but then became more constant and more severe  It then began to radiate up to his right upper quadrant and towards his epigastrium  Denies significant radiation to the back  Has had some associated nausea without vomiting  No fevers reported  He does report a history of chronic pancreatitis thought to be secondary to hypertriglyceridemia  He receives interventional radiology injections to the splanchnic nerve for pain control, last injection was few weeks ago  States that the symptoms seemed different than his typical pancreatitis pain  Past surgical history includes pancreatic pseudocyst removal, several endoscopic cyst drainages, and cholecystectomy  History provided by:  Patient   used: No    Abdominal Pain  Pain location:  RLQ  Pain quality: stabbing    Pain radiates to:  Epigastric region  Pain severity:  Moderate  Onset quality:  Gradual  Duration:  3 days  Timing:  Constant  Progression:  Worsening  Chronicity:  New  Relieved by:  Nothing  Worsened by:  Nothing  Ineffective treatments:  None tried  Associated symptoms: nausea    Associated symptoms: no chest pain, no chills, no cough, no dysuria, no fever, no hematuria, no shortness of breath, no sore throat and no vomiting        Prior to Admission Medications   Prescriptions Last Dose Informant Patient Reported? Taking?    Choline Fenofibrate (Fenofibric Acid) 135 MG CPDR   No No   Sig: Take 1 capsule (135 mg total) by mouth daily   EPIPEN 2-ARIEL 0 3 MG/0 3ML SOAJ  Self No No   Sig: Inject one syringe (0 3mg) intramuscularly once as directed   Lidocaine-Menthol 4-1 % GEL  Self Yes No   Sig: NuLido 4 %-1 % topical gel   Apply to affected area 2-4 times a day as needed for pain   Pancrelipase, Lip-Prot-Amyl, (Creon) 98961 units CPEP   No No   Sig: Take 1 capsule (36,000 Units total) by mouth 3 (three) times a day before meals   albuterol (PROVENTIL HFA,VENTOLIN HFA) 90 mcg/act inhaler   No No   Sig: INHALE TWO PUFFS BY MOUTH EVERY 6 HOURS AS NEEDED FOR WHEEZING   amLODIPine (NORVASC) 5 mg tablet   No No   Sig: TAKE ONE TABLET BY MOUTH EVERY DAY    diclofenac sodium (VOLTAREN) 1 %  Self Yes No   Sig: diclofenac 1 % topical gel   APPLY 2 GRAMS TO THE AFFECTED AREA(S) BY TOPICAL ROUTE 4 TIMES PER DAY   esomeprazole (NexIUM) 40 MG capsule  Self No No   Sig: Take 1 capsule (40 mg total) by mouth 2 (two) times a day before meals   famotidine (PEPCID) 40 MG tablet  Self Yes No   Sig: Take 40 mg by mouth 2 (two) times a day   insulin degludec Anny Bane FlexTouch) 100 units/mL injection pen  Self Yes No   Si units subcut in hs   lisinopril (ZESTRIL) 20 mg tablet   No No   Sig: TAKE ONE TABLET BY MOUTH EVERY DAY    omega-3-acid ethyl esters (LOVAZA) 1 g capsule   No No   Sig: TAKE TWO CAPSULES BY MOUTH TWICE DAILY    omeprazole (PriLOSEC) 20 mg delayed release capsule  Self Yes No   Sig: Take 20 mg by mouth daily   oxyCODONE-acetaminophen (PERCOCET) 5-325 mg per tablet   No No   Sig: Take 1 tablet by mouth every 6 (six) hours as needed for moderate painMax Daily Amount: 4 tablets   pancrelipase, Lip-Prot-Amyl, (Creon) 12,000 units capsule   No No   Sig: Take 12,000 units of lipase by mouth as needed for snacks   predniSONE 20 mg tablet   No No   Sig: Take 1 tablet (20 mg total) by mouth daily   pregabalin (LYRICA) 100 mg capsule   No No   Sig: TAKE ONE CAPSULE BY MOUTH TWICE DAILY    rosuvastatin (CRESTOR) 40 MG tablet  Self No No   Sig: Take 1 tablet (40 mg total) by mouth daily   traZODone (DESYREL) 50 mg tablet   No No   Sig: TAKE ONE TABLET BY MOUTH NIGHTLY AT BEDTIME       Facility-Administered Medications: None Past Medical History:   Diagnosis Date    Asthma     Chronic pain disorder     Diabetes mellitus (HCC)     GERD (gastroesophageal reflux disease)     Hyperlipidemia     Liver abscess     Meniscus tear     left knee work injury last assessed 08/24/2016    Pancreatitis     Pneumonia        Past Surgical History:   Procedure Laterality Date    CELIAC PLEXUS BLOCK Bilateral 10/29/2018    Procedure: SPLANCHNIC NERVE BLOCK;  Surgeon: Courtney Gurrola MD;  [facility];  Service: Pain Management     CELIAC PLEXUS BLOCK Bilateral 1/10/2019    Procedure: SPLANCHNIC NERVE BLOCK;  Surgeon: Courtney Gurrola MD;  [facility];  Service: Pain Management     CHOLECYSTECTOMY      ESOPHAGOGASTRODUODENOSCOPY N/A 11/16/2017    Procedure: ESOPHAGOGASTRODUODENOSCOPY (EGD); Surgeon: Oliverio Lozano MD;  [facility]; Service: Gastroenterology    ESOPHAGOGASTRODUODENOSCOPY N/A 4/19/2018    Procedure: ESOPHAGOGASTRODUODENOSCOPY (EGD); Surgeon: Katelyn Talamantes MD;  [facility]; Service: Gastroenterology    ESOPHAGOGASTRODUODENOSCOPY N/A 5/10/2018    Procedure: ESOPHAGOGASTRODUODENOSCOPY (EGD); Surgeon: Alirio Ureña MD;  [facility];   Service: Gastroenterology    IR TEMPORARY DIALYSIS CATHETER PLACEMENT  2/28/2019    KNEE ARTHROSCOPY Bilateral     KNEE ARTHROSCOPY Right 2009    cibishino last assessed 08/24/2016    KNEE ARTHROSCOPY Right 07/01/2019    LIVER SURGERY      NERVE BLOCK Bilateral 5/29/2018    Procedure: SPLANCHNIC NERVE BLOCK;  Surgeon: Courtney Gurrola MD;  [facility];  Service: Pain Management     PANCREAS SURGERY      stents    PANCREATIC CYST EXCISION      [place] INJECT NERV [address] Bilateral 5/9/2017    Procedure: CELIAC PLEXUS BLOCK ;  Surgeon: Courtney Gurrola MD;  [facility];  Service: Pain Management     IL INJECT NERV BLCK,CELIAC PLEXUS Bilateral 6/1/2017    Procedure: SPLANCHNIC NERVE BLOCK at T12;  Surgeon: Courtney Gurrola MD;  [facility];  Service: Pain Management     VT INJECT NERV BLCK,CELIAC PLEXUS Bilateral 8/8/2017    Procedure: BILATERAL SPLANCHNIC NERVE BLOCK T12;  Surgeon: Marcelo Flores MD;  [facility];  Service: Pain Management     VT INJECT NERV BLCK,CELIAC PLEXUS Bilateral 4/18/2019    Procedure: BLOCK / INJECTION CELIAC PLEXUS;  Surgeon: Marcelo Flores MD;  [facility];  Service: Pain Management     VT INJECT NERV BLCK,CELIAC PLEXUS Bilateral 8/20/2019    Procedure: SPLANCHNIC NERVE BLOCK;  Surgeon: Marcelo Flores MD;  [facility];  Service: Pain Management     VT INJECT NERV BLCK,CELIAC PLEXUS Bilateral 10/17/2019    Procedure: SPLANCHNIC NERVE BLOCK;  Surgeon: Marcelo Flores MD;  [facility];  Service: Pain Management     VT INJECT NERV [place] Bilateral 12/28/2017    Procedure: SPLANCHNIC NERVE BLOCK;  Surgeon: Marcelo Flores MD;  [facility];  Service: Pain Management     VT LAP,CHOLECYSTECTOMY N/A 2/14/2017    Procedure: LAPAROSCOPIC CHOLECYSTECTOMY, IOC, POSSIBLE OPEN ;  Surgeon: Rex Navarro MD;  [facility];  Service: General    ROTATOR CUFF REPAIR Right     SHOULDER ARTHROSCOPY      SHOULDER ARTHROSCOPY Right        Family History   Problem Relation Age of Onset    Cirrhosis Mother     Heart disease Other         cardiac disorder    Cancer Other      I have reviewed and agree with the history as documented  E-Cigarette/Vaping    E-Cigarette Use Never User      E-Cigarette/Vaping Substances    Nicotine No     THC No     CBD No     Flavoring No     Other No     Unknown No      Social History     Tobacco Use    Smoking status: Current Every Day Smoker     Packs/day: 1 00     Years: 20 00     Pack years: 20 00    Smokeless tobacco: Never Used   Substance Use Topics    Alcohol use:  Yes     Alcohol/week: 10 0 standard drinks     Types: 10 Cans of beer per week     Frequency: 2-4 times a month     Drinks per session: 1 or 2     Comment: quit    Drug use: No       Review of Systems   Constitutional: Negative for chills and fever  HENT: Negative for ear pain and sore throat  Eyes: Negative for pain and visual disturbance  Respiratory: Negative for cough and shortness of breath  Cardiovascular: Negative for chest pain and palpitations  Gastrointestinal: Positive for abdominal pain and nausea  Negative for vomiting  Genitourinary: Negative for dysuria and hematuria  Musculoskeletal: Negative for arthralgias, back pain, neck pain and neck stiffness  Skin: Negative for color change and rash  Neurological: Negative for seizures and syncope  All other systems reviewed and are negative  Physical Exam  Physical Exam  Vitals signs and nursing note reviewed  Constitutional:       Appearance: He is well-developed  HENT:      Head: Normocephalic and atraumatic  Eyes:      Conjunctiva/sclera: Conjunctivae normal    Neck:      Musculoskeletal: Neck supple  Cardiovascular:      Rate and Rhythm: Normal rate and regular rhythm  Heart sounds: No murmur  Pulmonary:      Effort: Pulmonary effort is normal  No respiratory distress  Breath sounds: Normal breath sounds  Abdominal:      Palpations: Abdomen is soft  Tenderness: There is no abdominal tenderness  Skin:     General: Skin is warm and dry  Capillary Refill: Capillary refill takes less than 2 seconds  Neurological:      General: No focal deficit present  Mental Status: He is alert and oriented to person, place, and time           Vital Signs  ED Triage Vitals [03/12/21 2125]   Temperature Pulse Respirations Blood Pressure SpO2   (!) 97 4 °F (36 3 °C) 76 17 145/85 97 %      Temp Source Heart Rate Source Patient Position - Orthostatic VS BP Location FiO2 (%)   Temporal Monitor Sitting Left arm --      Pain Score       7           Vitals:    03/12/21 2125 03/12/21 2349 03/13/21 0030   BP: 145/85 124/77    Pulse: 76 (!) 54 (!) 52   Patient Position - Orthostatic VS: Sitting Lying          Visual Acuity      ED Medications  Medications   lactated ringers bolus 1,000 mL (0 mL Intravenous Stopped 3/13/21 0052)   ondansetron (ZOFRAN) injection 4 mg (4 mg Intravenous Given 3/12/21 2234)   ketorolac (TORADOL) injection 15 mg (15 mg Intravenous Given 3/12/21 2235)   iohexol (OMNIPAQUE) 350 MG/ML injection (MULTI-DOSE) 100 mL (100 mL Intravenous Given 3/12/21 2322)       Diagnostic Studies  Results Reviewed     Procedure Component Value Units Date/Time    Troponin I [[id number]]  (Normal) Collected: 03/12/21 2232    Lab Status: Final result Specimen: Blood from Arm, Left Updated: 03/12/21 2259     Troponin I <0 02 ng/mL     Lactic acid [[id number]]  (Normal) Collected: 03/12/21 2232    Lab Status: Final result Specimen: Blood from Arm, Left Updated: 03/12/21 2258     LACTIC ACID 0 7 mmol/L     Narrative:      Result may be elevated if tourniquet was used during collection      Hepatic function panel [[id number]]  (Abnormal) Collected: 03/12/21 2232    Lab Status: Final result Specimen: Blood from Arm, Left Updated: 03/12/21 2255     Total Bilirubin 0 37 mg/dL      Bilirubin, Direct 0 15 mg/dL      Alkaline Phosphatase 121 U/L      AST 27 U/L      ALT 42 U/L      Total Protein 6 7 g/dL      Albumin 3 6 g/dL     Lipase [[id number]]  (Normal) Collected: 03/12/21 2232    Lab Status: Final result Specimen: Blood from Arm, Left Updated: 03/12/21 2255     Lipase 104 u/L     Basic metabolic panel [[id number]] Collected: 03/12/21 2232    Lab Status: Final result Specimen: Blood from Arm, Left Updated: 03/12/21 2255     Sodium 145 mmol/L      Potassium 3 8 mmol/L      Chloride 108 mmol/L      CO2 27 mmol/L      ANION GAP 10 mmol/L      BUN 15 mg/dL      Creatinine 0 64 mg/dL      Glucose 104 mg/dL      Calcium 8 6 mg/dL      eGFR 116 ml/min/1 73sq m     Narrative:      [place] guidelines for Chronic Kidney Disease (CKD):     Stage 1 with normal or high GFR (GFR > 90 mL/min/1 73 square meters)    Stage 2 Mild CKD (GFR = 60-89 mL/min/1 73 square meters)    Stage 3A Moderate CKD (GFR = 45-59 mL/min/1 73 square meters)    Stage 3B Moderate CKD (GFR = 30-44 mL/min/1 73 square meters)    Stage 4 Severe CKD (GFR = 15-29 mL/min/1 73 square meters)    Stage 5 End Stage CKD (GFR <15 mL/min/1 73 square meters)  Note: GFR calculation is accurate only with a steady state creatinine    CBC and differential [[id number]]  (Abnormal) Collected: 03/12/21 2232    Lab Status: Final result Specimen: Blood from Arm, Left Updated: 03/12/21 2242     WBC 4 87 Thousand/uL      RBC 3 87 Million/uL      Hemoglobin 12 9 g/dL      Hematocrit 38 4 %      MCV 99 fL      MCH 33 3 pg      MCHC 33 6 g/dL      RDW 13 4 %      MPV 11 0 fL      Platelets 220 Thousands/uL      Neutrophils Relative 50 %      Lymphocytes Relative 39 %      Monocytes Relative 8 %      Eosinophils Relative 3 %      Basophils Relative 0 %      Neutrophils Absolute 2 40 Thousands/µL      Lymphocytes Absolute 1 92 Thousands/µL      Monocytes Absolute 0 41 Thousand/µL      Eosinophils Absolute 0 12 Thousand/µL      Basophils Absolute 0 02 Thousands/µL                  CT abdomen pelvis with contrast   Final Result by Mimi Barber MD (03/13 0029)   Persistent gastric wall thickening concerning for gastritis  Fatty enlarged liver and splenomegaly  Trace ascites of unknown origin  Workstation performed: [id number]                 Procedures  Procedures         ED Course  ED Course as of Mar 24 0719   Fri Mar 12, 2021   2312 EKG reviewed by me, sinus bradycardia rate of 52, incomplete right bundle branch block, otherwise normal EKG  Normal intervals and no acute ST changes to suggest cardiac ischemia                                                MDM  Number of Diagnoses or Management Options  Abdominal pain: new and requires workup  Gastritis: new and requires workup     Amount and/or Complexity of Data Reviewed  Clinical lab tests: ordered and reviewed  Tests in the radiology section of CPT®: reviewed and ordered  Review and summarize past medical records: yes        Disposition  Final diagnoses:   Abdominal pain   Gastritis     Time reflects when diagnosis was documented in both MDM as applicable and the Disposition within this note     Time User Action Codes Description Comment    3/12/2021 10:22 PM Dickson Guadalupe [R10 9] Abdominal pain     3/13/2021 12:35 AM Dickson Guadalupe [K29 70] Gastritis       ED Disposition     ED Disposition Condition Date/Time Comment    Discharge Stable Sat Mar 13, 2021 12:34 AM Garo Hoffman discharge to home/self care              Follow-up Information     Follow up With Specialties Details Why Contact Info Additional [facility]   [address] [address]  [address] [place] [phone]  [address] [place] [address] [facility] [address], [place], [place], [address] [facility]          Discharge Medication List as of 3/13/2021 12:36 AM      CONTINUE these medications which have NOT CHANGED    Details   albuterol (PROVENTIL HFA,VENTOLIN HFA) 90 mcg/act inhaler INHALE TWO PUFFS BY MOUTH EVERY 6 HOURS AS NEEDED FOR WHEEZING, Normal      amLODIPine (NORVASC) 5 mg tablet TAKE ONE TABLET BY MOUTH EVERY DAY , Normal      Choline Fenofibrate (Fenofibric Acid) 135 MG CPDR Take 1 capsule (135 mg total) by mouth daily, Starting Mon 2/15/2021, Normal      diclofenac sodium (VOLTAREN) 1 % diclofenac 1 % topical gel   APPLY 2 GRAMS TO THE AFFECTED AREA(S) BY TOPICAL ROUTE 4 TIMES PER DAY, Historical Med      EPIPEN 2-ARIEL 0 3 MG/0 3ML SOAJ Inject one syringe (0 3mg) intramuscularly once as directed, Normal      esomeprazole (NexIUM) 40 MG capsule Take 1 capsule (40 mg total) by mouth 2 (two) times a day before meals, Starting Mon 8/17/2020, Normal      famotidine (PEPCID) 40 MG tablet Take 40 mg by mouth 2 (two) times a day, Historical Med      insulin degludec Veneda Leventhal FlexTouch) 100 units/mL injection pen 20 units subcut in hs, Historical Med      Lidocaine-Menthol 4-1 % GEL NuLido 4 %-1 % topical gel   Apply to affected area 2-4 times a day as needed for pain, Historical Med      lisinopril (ZESTRIL) 20 mg tablet TAKE ONE TABLET BY MOUTH EVERY DAY , Normal      omega-3-acid ethyl esters (LOVAZA) 1 g capsule TAKE TWO CAPSULES BY MOUTH TWICE DAILY , Normal      omeprazole (PriLOSEC) 20 mg delayed release capsule Take 20 mg by mouth daily, Historical Med      oxyCODONE-acetaminophen (PERCOCET) 5-325 mg per tablet Take 1 tablet by mouth every 6 (six) hours as needed for moderate painMax Daily Amount: 4 tablets, Starting Wed 1/20/2021, Normal      pancrelipase, Lip-Prot-Amyl, (Creon) 12,000 units capsule Take 12,000 units of lipase by mouth as needed for snacks, Starting u 11/12/2020, Normal      predniSONE 20 mg tablet Take 1 tablet (20 mg total) by mouth daily, Starting Wed 1/20/2021, Normal      pregabalin (LYRICA) 100 mg capsule TAKE ONE CAPSULE BY MOUTH TWICE DAILY , Normal      rosuvastatin (CRESTOR) 40 MG tablet Take 1 tablet (40 mg total) by mouth daily, Starting Wed 9/23/2020, Normal      traZODone (DESYREL) 50 mg tablet TAKE ONE TABLET BY MOUTH NIGHTLY AT BEDTIME , Normal      ondansetron (ZOFRAN-ODT) 4 mg disintegrating tablet DISSOLVE ONE TABLET IN MOUTH EVERY EIGHT HOURS AS NEEDED for nausea and vomiting , Normal           No discharge procedures on file      PDMP Review     None          ED Provider  Electronically Signed by           Bc Varner MD  03/24/21 4471

## 2021-03-13 NOTE — ED PROVIDER NOTES
History  Chief Complaint   Patient presents with    Abdominal Pain     c/o right sided abdominal pain  was seen yesterday and sent home but still persists     HPI    49 yo M chronic pancreatitis on insulin, HLD, gerd, asthma, chronic pain with injections to splanchic nerve, last injection Jan 2021, presents to the ed for eval of abd pain  Seen yesterday for similar symptoms with unremarkable blood work, and no acute findings on imaging  Four days of symptoms  Duration: intermittent   Pain currently: 7/10  Pain meds taken: none  Prior similar pain: no  Where: epigastric and right lower quadrant  Radiation: no  Associated N/V: no  Associated with food: unchanged with food  Emesis: no  Last BM: normal, this am  Urinary complaints:  no    Prior Surgeries cholecystectomy    Previous Imaging     Yesterday ct scan abd plevis:  IMPRESSION:  Persistent gastric wall thickening concerning for gastritis  Fatty enlarged liver and splenomegaly  Trace ascites of unknown origin  Workstation performed: [id number]    Last Colonsocopy/Endoscopy/GI visit:  2020: egd reflux  Four large polyps removed with cold snare polypectomy    Alcohol intake: social, intermittent,  A few days ago      Prior to Admission Medications   Prescriptions Last Dose Informant Patient Reported? Taking?    Choline Fenofibrate (Fenofibric Acid) 135 MG CPDR   No No   Sig: Take 1 capsule (135 mg total) by mouth daily   EPIPEN 2-ARIEL 0 3 MG/0 3ML SOAJ  Self No No   Sig: Inject one syringe (0 3mg) intramuscularly once as directed   Lidocaine-Menthol 4-1 % GEL  Self Yes No   Sig: NuLido 4 %-1 % topical gel   Apply to affected area 2-4 times a day as needed for pain   Pancrelipase, Lip-Prot-Amyl, (Creon) 23934 units CPEP   No No   Sig: Take 1 capsule (36,000 Units total) by mouth 3 (three) times a day before meals   albuterol (PROVENTIL HFA,VENTOLIN HFA) 90 mcg/act inhaler   No No   Sig: INHALE TWO PUFFS BY MOUTH EVERY 6 HOURS AS NEEDED FOR WHEEZING   amLODIPine (NORVASC) 5 mg tablet   No No   Sig: TAKE ONE TABLET BY MOUTH EVERY DAY    diclofenac sodium (VOLTAREN) 1 %  Self Yes No   Sig: diclofenac 1 % topical gel   APPLY 2 GRAMS TO THE AFFECTED AREA(S) BY TOPICAL ROUTE 4 TIMES PER DAY   esomeprazole (NexIUM) 40 MG capsule  Self No No   Sig: Take 1 capsule (40 mg total) by mouth 2 (two) times a day before meals   famotidine (PEPCID) 40 MG tablet  Self Yes No   Sig: Take 40 mg by mouth 2 (two) times a day   insulin degludec [employer] FlexTouch) 100 units/mL injection pen  Self Yes No   Si units subcut in hs   lisinopril (ZESTRIL) 20 mg tablet   No No   Sig: TAKE ONE TABLET BY MOUTH EVERY DAY    omega-3-acid ethyl esters (LOVAZA) 1 g capsule   No No   Sig: TAKE TWO CAPSULES BY MOUTH TWICE DAILY    omeprazole (PriLOSEC) 20 mg delayed release capsule  Self Yes No   Sig: Take 20 mg by mouth daily   ondansetron (ZOFRAN-ODT) 4 mg disintegrating tablet   No No   Sig: DISSOLVE ONE TABLET IN MOUTH EVERY EIGHT HOURS AS NEEDED for nausea and vomiting    oxyCODONE-acetaminophen (PERCOCET) 5-325 mg per tablet   No No   Sig: Take 1 tablet by mouth every 6 (six) hours as needed for moderate painMax Daily Amount: 4 tablets   pancrelipase, Lip-Prot-Amyl, (Creon) 12,000 units capsule   No No   Sig: Take 12,000 units of lipase by mouth as needed for snacks   predniSONE 20 mg tablet   No No   Sig: Take 1 tablet (20 mg total) by mouth daily   pregabalin (LYRICA) 100 mg capsule   No No   Sig: TAKE ONE CAPSULE BY MOUTH TWICE DAILY    rosuvastatin (CRESTOR) 40 MG tablet  Self No No   Sig: Take 1 tablet (40 mg total) by mouth daily   traZODone (DESYREL) 50 mg tablet   No No   Sig: TAKE ONE TABLET BY MOUTH NIGHTLY AT BEDTIME       Facility-Administered Medications: None       Past Medical History:   Diagnosis Date    Asthma     Chronic pain disorder     Diabetes mellitus (HCC)     GERD (gastroesophageal reflux disease)     Hyperlipidemia     Liver abscess     Meniscus tear     left knee work injury last assessed 08/24/2016    Pancreatitis     Pneumonia        Past Surgical History:   Procedure Laterality Date    CELIAC PLEXUS BLOCK Bilateral 10/29/2018    Procedure: SPLANCHNIC NERVE BLOCK;  Surgeon: Ancelmo Barrientos MD;  [facility];  Service: Pain Management     CELIAC PLEXUS BLOCK Bilateral 1/10/2019    Procedure: SPLANCHNIC NERVE BLOCK;  Surgeon: Ancelmo Barrientos MD;  [facility];  Service: Pain Management     CHOLECYSTECTOMY      ESOPHAGOGASTRODUODENOSCOPY N/A 11/16/2017    Procedure: ESOPHAGOGASTRODUODENOSCOPY (EGD); Surgeon: Stanley Roman MD;  [facility]; Service: Gastroenterology    ESOPHAGOGASTRODUODENOSCOPY N/A 4/19/2018    Procedure: ESOPHAGOGASTRODUODENOSCOPY (EGD); Surgeon: Chelita Mathis MD;  [facility]; Service: Gastroenterology    ESOPHAGOGASTRODUODENOSCOPY N/A 5/10/2018    Procedure: ESOPHAGOGASTRODUODENOSCOPY (EGD); Surgeon: Faith Davis MD;  [facility];   Service: Gastroenterology    IR TEMPORARY DIALYSIS CATHETER PLACEMENT  2/28/2019    KNEE ARTHROSCOPY Bilateral     KNEE ARTHROSCOPY Right 2009    cibishino last assessed 08/24/2016    KNEE ARTHROSCOPY Right 07/01/2019    LIVER SURGERY      NERVE BLOCK Bilateral 5/29/2018    Procedure: SPLANCHNIC NERVE BLOCK;  Surgeon: Ancelmo Barrientos MD;  [facility];  Service: Pain Management     PANCREAS SURGERY      stents    PANCREATIC CYST EXCISION      VT INJECT NERV BLCK,CELIAC PLEXUS Bilateral 5/9/2017    Procedure: CELIAC PLEXUS BLOCK ;  Surgeon: Ancelmo Barrientos MD;  [facility];  Service: Pain Management     VT INJECT NERV BLCK,CELIAC PLEXUS Bilateral 6/1/2017    Procedure: SPLANCHNIC NERVE BLOCK at T12;  Surgeon: Ancelmo Barrientos MD;  [facility];  Service: Pain Management     VT INJECT NERV BLCK,CELIAC PLEXUS Bilateral 8/8/2017    Procedure: BILATERAL SPLANCHNIC NERVE BLOCK T12;  Surgeon: Ancelmo Barrientos MD;  [facility];  Service: Pain Management     AL INJECT NERV BLCK,CELIAC PLEXUS Bilateral 4/18/2019    Procedure: BLOCK / INJECTION CELIAC PLEXUS;  Surgeon: Sujey Mcnamara MD;  [facility];  Service: Pain Management     AL INJECT NERV BLCK,CELIAC PLEXUS Bilateral 8/20/2019    Procedure: SPLANCHNIC NERVE BLOCK;  Surgeon: Sujey Mcnamara MD;  [facility];  Service: Pain Management     AL INJECT NERV BLCK,CELIAC PLEXUS Bilateral 10/17/2019    Procedure: SPLANCHNIC NERVE BLOCK;  Surgeon: Sujey Mcnamara MD;  [facility];  Service: Pain Management     AL INJECT NERV Maryl Platts Bilateral 12/28/2017    Procedure: SPLANCHNIC NERVE BLOCK;  Surgeon: Sujey Mcnamara MD;  [facility];  Service: Pain Management     AL LAP,CHOLECYSTECTOMY N/A 2/14/2017    Procedure: LAPAROSCOPIC CHOLECYSTECTOMY, IOC, POSSIBLE OPEN ;  Surgeon: Stephania Dickerson MD;  [facility];  Service: General    ROTATOR CUFF REPAIR Right     SHOULDER ARTHROSCOPY      SHOULDER ARTHROSCOPY Right        Family History   Problem Relation Age of Onset    Cirrhosis Mother     Heart disease Other         cardiac disorder    Cancer Other      I have reviewed and agree with the history as documented  E-Cigarette/Vaping    E-Cigarette Use Never User      E-Cigarette/Vaping Substances    Nicotine No     THC No     CBD No     Flavoring No     Other No     Unknown No      Social History     Tobacco Use    Smoking status: Current Every Day Smoker     Packs/day: 1 00     Years: 20 00     Pack years: 20 00    Smokeless tobacco: Never Used   Substance Use Topics    Alcohol use: Yes     Alcohol/week: 10 0 standard drinks     Types: 10 Cans of beer per week     Frequency: 2-4 times a month     Drinks per session: 1 or 2     Comment: quit    Drug use: No       Review of Systems   Constitutional: Negative for chills, fatigue and fever  HENT: Negative for nosebleeds and sore throat  Eyes: Negative for redness and visual disturbance  Respiratory: Negative for shortness of breath and wheezing  Cardiovascular: Negative for chest pain and palpitations  Gastrointestinal: Positive for abdominal pain  Negative for diarrhea  Endocrine: Negative for polyuria  Genitourinary: Negative for difficulty urinating and testicular pain  Musculoskeletal: Negative for back pain and neck stiffness  Skin: Negative for rash and wound  Neurological: Negative for seizures, speech difficulty and headaches  Psychiatric/Behavioral: Negative for dysphoric mood and hallucinations  All other systems reviewed and are negative  Physical Exam  Physical Exam  Vitals signs and nursing note reviewed  Constitutional:       Appearance: He is well-developed  HENT:      Head: Normocephalic and atraumatic  Right Ear: External ear normal       Left Ear: External ear normal    Eyes:      Conjunctiva/sclera: Conjunctivae normal    Neck:      Musculoskeletal: Normal range of motion  Cardiovascular:      Rate and Rhythm: Normal rate and regular rhythm  Heart sounds: Normal heart sounds  Pulmonary:      Effort: Pulmonary effort is normal       Breath sounds: Normal breath sounds  No wheezing  Chest:      Chest wall: No tenderness  Abdominal:      General: Bowel sounds are normal       Palpations: Abdomen is soft  Tenderness: There is no abdominal tenderness  There is no guarding  Comments: Generalized discomfort with no focal area of tenderness  Hepatomegaly noted, appears chronic   Musculoskeletal: Normal range of motion  Skin:     General: Skin is warm and dry  Findings: No rash  Neurological:      Mental Status: He is alert and oriented to person, place, and time  Cranial Nerves: No cranial nerve deficit  Sensory: No sensory deficit  Motor: No abnormal muscle tone        Coordination: Coordination normal          Vital Signs  ED Triage Vitals [03/13/21 1504]   Temperature Pulse Respirations Blood Pressure SpO2   97 7 °F (36 5 °C) 72 18 140/85 99 %      Temp Source Heart Rate Source Patient Position - Orthostatic VS BP Location FiO2 (%)   Oral Monitor Sitting Right arm --      Pain Score       8           Vitals:    03/13/21 1504   BP: 140/85   Pulse: 72   Patient Position - Orthostatic VS: Sitting         Visual Acuity      ED Medications  Medications   sodium chloride 0 9 % bolus 1,000 mL (0 mL Intravenous Stopped 3/13/21 1707)   ketorolac (TORADOL) injection 15 mg (15 mg Intravenous Given 3/13/21 1558)   dicyclomine (BENTYL) tablet 20 mg (20 mg Oral Given 3/13/21 1558)   aluminum-magnesium hydroxide-simethicone (MYLANTA) oral suspension 30 mL (30 mL Oral Given 3/13/21 1559)   Lidocaine Viscous HCl (XYLOCAINE) 2 % mucosal solution 15 mL (15 mL Swish & Swallow Given 3/13/21 1559)   morphine (PF) 4 mg/mL injection 4 mg (4 mg Intravenous Given 3/13/21 1751)       Diagnostic Studies  Results Reviewed     Procedure Component Value Units Date/Time    Comprehensive metabolic panel [[id number]]  (Abnormal) Collected: 03/13/21 1629    Lab Status: Final result Specimen: Blood from Arm, Right Updated: 03/13/21 1705     Sodium 142 mmol/L      Potassium 3 5 mmol/L      Chloride 108 mmol/L      CO2 27 mmol/L      ANION GAP 7 mmol/L      BUN 11 mg/dL      Creatinine 0 61 mg/dL      Glucose 82 mg/dL      Calcium 8 1 mg/dL      Corrected Calcium 8 6 mg/dL      AST 25 U/L      ALT 39 U/L      Alkaline Phosphatase 83 U/L      Total Protein 6 0 g/dL      Albumin 3 4 g/dL      Total Bilirubin 0 67 mg/dL      eGFR 118 ml/min/1 73sq m     Narrative:      [place]uma guidelines for Chronic Kidney Disease (CKD):     Stage 1 with normal or high GFR (GFR > 90 mL/min/1 73 square meters)    Stage 2 Mild CKD (GFR = 60-89 mL/min/1 73 square meters)    Stage 3A Moderate CKD (GFR = 45-59 mL/min/1 73 square meters)    Stage 3B Moderate CKD (GFR = 30-44 mL/min/1 73 square meters)    Stage 4 Severe CKD (GFR = 15-29 mL/min/1 73 square meters)    Stage 5 End Stage CKD (GFR <15 mL/min/1 73 square meters)  Note: GFR calculation is accurate only with a steady state creatinine    Lipase [[id number]]  (Abnormal) Collected: 03/13/21 1629    Lab Status: Final result Specimen: Blood from Arm, Right Updated: 03/13/21 1702     Lipase 50 u/L     CBC and differential [[id number]]  (Abnormal) Collected: 03/13/21 1551    Lab Status: Final result Specimen: Blood from Hand, Left Updated: 03/13/21 1645     WBC 5 09 Thousand/uL      RBC 4 34 Million/uL      Hemoglobin 14 1 g/dL      Hematocrit 42 9 %      MCV 99 fL      MCH 32 5 pg      MCHC 32 9 g/dL      RDW 13 0 %      MPV 11 8 fL      Platelets 145 Thousands/uL      nRBC 0 /100 WBCs      Neutrophils Relative 56 %      Immat GRANS % 0 %      Lymphocytes Relative 31 %      Monocytes Relative 10 %      Eosinophils Relative 2 %      Basophils Relative 1 %      Neutrophils Absolute 2 88 Thousands/µL      Immature Grans Absolute 0 02 Thousand/uL      Lymphocytes Absolute 1 56 Thousands/µL      Monocytes Absolute 0 50 Thousand/µL      Eosinophils Absolute 0 10 Thousand/µL      Basophils Absolute 0 03 Thousands/µL     UA (URINE) with reflex to Scope [[id number]] Collected: 03/13/21 1623    Lab Status: Final result Specimen: Urine, Clean Catch Updated: 03/13/21 1630     Color, UA Yellow     Clarity, UA Clear     Specific [place], UA 1 020     pH, UA 7 0     Leukocytes, UA Negative     Nitrite, UA Negative     Protein, UA Negative mg/dl      Glucose, UA Negative mg/dl      Ketones, UA Negative mg/dl      Urobilinogen, UA 0 2 E U /dl      Bilirubin, UA Negative     Blood, UA Negative                 No orders to display              Procedures  Procedures         ED Course  ED Course as of Mar 13 2135   Sat Mar 13, 2021   1617 CMP still not in process, lab error      1727 Leukocytes, UA: Negative   1727 Nitrite, UA: Negative   1727 Blood, UA: Negative   1733 Patient still having pain, states he is unable to take NSAIDs, will give patient short course of opioids till he can see GI on Monday  No acute changes to his labs, had imaging done yesterday  Patient understands that he needs further work up in outpatient setting, and states he will call GI first thing Monday for follow up  MDM     Repeat er visit for same abd pain  Given symptomatic treatments  Patient discharged with GI follow up  See ed course  The patient was instructed to follow up as documented  Strict return precautions were discussed with the patient and the patient was instructed to return to the emergency department immediately if symptoms worsen  The patient/patient family member acknowledged and were in agreement with plan  Disposition  Final diagnoses:   Abdominal pain, unspecified abdominal location   Gastric wall thickening   Fatty liver   Splenomegaly   Enlarged liver   Abdominal pain, epigastric     Time reflects when diagnosis was documented in both MDM as applicable and the Disposition within this note     Time User Action Codes Description Comment    3/13/2021  5:35  [address], [address] [R10 9] Abdominal pain, unspecified abdominal location     3/13/2021  5:36 PM [place] Add [K31 89] Gastric wall thickening     3/13/2021  5:36 PM [place] Add [K76 0] Fatty liver     3/13/2021  5:36 PM [facility] [place] Add [R16 1] Splenomegaly     3/13/2021  5:37 PM [place] Add [R16 0] Enlarged liver     3/13/2021  5:55 PM [place] Add [R10 13] Abdominal pain, epigastric       ED Disposition     ED Disposition Condition Date/Time Comment    Discharge Stable Sat Mar 13, 2021  5:33 PM Patrick Crawford discharge to home/self care              Follow-up Information     Follow up With Specialties Details Why Contact Info    Amena Padilla MD Gastroenterology Schedule an appointment as soon as possible for a visit in 2 days For follow up regarding your symptoms and recheck [address]  [address]  [facility] [address]  [phone]            Discharge Medication List as of 3/13/2021  5:50 PM      START taking these medications    Details   dicyclomine (BENTYL) 20 mg tablet Take 1 tablet (20 mg total) by mouth 2 (two) times a day, Starting Sat 3/13/2021, Normal      oxyCODONE (ROXICODONE) 5 mg immediate release tablet Take 1 tablet (5 mg total) by mouth every 4 (four) hours as needed for moderate pain for up to 2 daysMax Daily Amount: 30 mg, Starting Sat 3/13/2021, Until Mon 3/15/2021, Normal         CONTINUE these medications which have NOT CHANGED    Details   albuterol (PROVENTIL HFA,VENTOLIN HFA) 90 mcg/act inhaler INHALE TWO PUFFS BY MOUTH EVERY 6 HOURS AS NEEDED FOR WHEEZING, Normal      amLODIPine (NORVASC) 5 mg tablet TAKE ONE TABLET BY MOUTH EVERY DAY , Normal      Choline Fenofibrate (Fenofibric Acid) 135 MG CPDR Take 1 capsule (135 mg total) by mouth daily, Starting Mon 2/15/2021, Normal      diclofenac sodium (VOLTAREN) 1 % diclofenac 1 % topical gel   APPLY 2 GRAMS TO THE AFFECTED AREA(S) BY TOPICAL ROUTE 4 TIMES PER DAY, Historical Med      EPIPEN 2-ARIEL 0 3 MG/0 3ML SOAJ Inject one syringe (0 3mg) intramuscularly once as directed, Normal      esomeprazole (NexIUM) 40 MG capsule Take 1 capsule (40 mg total) by mouth 2 (two) times a day before meals, Starting Mon 8/17/2020, Normal      famotidine (PEPCID) 40 MG tablet Take 40 mg by mouth 2 (two) times a day, Historical Med      insulin degludec [place] FlexTouch) 100 units/mL injection pen 20 units subcut in hs, Historical Med      Lidocaine-Menthol 4-1 % GEL NuLido 4 %-1 % topical gel   Apply to affected area 2-4 times a day as needed for pain, Historical Med      lisinopril (ZESTRIL) 20 mg tablet TAKE ONE TABLET BY MOUTH EVERY DAY , Normal      omega-3-acid ethyl esters (LOVAZA) 1 g capsule TAKE TWO CAPSULES BY MOUTH TWICE DAILY , Normal      omeprazole (PriLOSEC) 20 mg delayed release capsule Take 20 mg by mouth daily, Historical Med      ondansetron (ZOFRAN-ODT) 4 mg disintegrating tablet DISSOLVE ONE TABLET IN MOUTH EVERY EIGHT HOURS AS NEEDED for nausea and vomiting , Normal      oxyCODONE-acetaminophen (PERCOCET) 5-325 mg per tablet Take 1 tablet by mouth every 6 (six) hours as needed for moderate painMax Daily Amount: 4 tablets, Starting Wed 1/20/2021, Normal      pancrelipase, Lip-Prot-Amyl, (Creon) 12,000 units capsule Take 12,000 units of lipase by mouth as needed for snacks, Starting Thu 11/12/2020, Normal      predniSONE 20 mg tablet Take 1 tablet (20 mg total) by mouth daily, Starting Wed 1/20/2021, Normal      pregabalin (LYRICA) 100 mg capsule TAKE ONE CAPSULE BY MOUTH TWICE DAILY , Normal      rosuvastatin (CRESTOR) 40 MG tablet Take 1 tablet (40 mg total) by mouth daily, Starting Wed 9/23/2020, Normal      traZODone (DESYREL) 50 mg tablet TAKE ONE TABLET BY MOUTH NIGHTLY AT BEDTIME , Normal               PDMP Review     None          ED Provider  Electronically Signed by           Venecia Reeves MD  03/13/21 8220

## 2021-03-15 LAB
ATRIAL RATE: 52 BPM
P AXIS: 16 DEGREES
PR INTERVAL: 156 MS
QRS AXIS: 56 DEGREES
QRSD INTERVAL: 118 MS
QT INTERVAL: 444 MS
QTC INTERVAL: 412 MS
T WAVE AXIS: 27 DEGREES
VENTRICULAR RATE: 52 BPM

## 2021-03-15 PROCEDURE — 93010 ELECTROCARDIOGRAM REPORT: CPT | Performed by: INTERNAL MEDICINE

## 2021-03-15 RX ORDER — ONDANSETRON 4 MG/1
TABLET, ORALLY DISINTEGRATING ORAL
Qty: 30 TABLET | Refills: 0 | Status: SHIPPED | OUTPATIENT
Start: 2021-03-15 | End: 2021-05-12 | Stop reason: SDUPTHER

## 2021-03-25 ENCOUNTER — OFFICE VISIT (OUTPATIENT)
Dept: OBGYN CLINIC | Facility: CLINIC | Age: 46
End: 2021-03-25
Payer: COMMERCIAL

## 2021-03-25 ENCOUNTER — OFFICE VISIT (OUTPATIENT)
Dept: GASTROENTEROLOGY | Facility: CLINIC | Age: 46
End: 2021-03-25
Payer: COMMERCIAL

## 2021-03-25 VITALS
HEIGHT: 68 IN | RESPIRATION RATE: 20 BRPM | DIASTOLIC BLOOD PRESSURE: 70 MMHG | WEIGHT: 197.6 LBS | BODY MASS INDEX: 29.95 KG/M2 | SYSTOLIC BLOOD PRESSURE: 116 MMHG | HEART RATE: 64 BPM

## 2021-03-25 VITALS
HEART RATE: 74 BPM | SYSTOLIC BLOOD PRESSURE: 122 MMHG | HEIGHT: 68 IN | WEIGHT: 197.2 LBS | BODY MASS INDEX: 29.89 KG/M2 | DIASTOLIC BLOOD PRESSURE: 80 MMHG

## 2021-03-25 DIAGNOSIS — K31.89 GASTRIC WALL THICKENING: ICD-10-CM

## 2021-03-25 DIAGNOSIS — K76.0 FATTY LIVER: ICD-10-CM

## 2021-03-25 DIAGNOSIS — R16.0 ENLARGED LIVER: ICD-10-CM

## 2021-03-25 DIAGNOSIS — M50.30 DEGENERATIVE DISC DISEASE, CERVICAL: ICD-10-CM

## 2021-03-25 DIAGNOSIS — R10.9 ABDOMINAL PAIN, UNSPECIFIED ABDOMINAL LOCATION: ICD-10-CM

## 2021-03-25 DIAGNOSIS — K86.1 OTHER CHRONIC PANCREATITIS (HCC): ICD-10-CM

## 2021-03-25 DIAGNOSIS — M54.12 RADICULOPATHY, CERVICAL REGION: Primary | ICD-10-CM

## 2021-03-25 DIAGNOSIS — E78.1 FAMILIAL HYPERTRIGLYCERIDEMIA: Primary | ICD-10-CM

## 2021-03-25 PROCEDURE — 99214 OFFICE O/P EST MOD 30 MIN: CPT | Performed by: PHYSICIAN ASSISTANT

## 2021-03-25 PROCEDURE — 99214 OFFICE O/P EST MOD 30 MIN: CPT | Performed by: ORTHOPAEDIC SURGERY

## 2021-03-25 NOTE — PROGRESS NOTES
[facility] - Outpatient Follow-up Note  Garo Hoffman 48 y o  male MRN: [id number]  Encounter: [id number]          ASSESSMENT AND PLAN:      1  RUQ Pain  - Seen in the ED on 3/12 and 3/13 for severe RUQ pain; unclear etiology, CT A/P only showed chronic gastric wall thickening and hepatosplenomegaly and his LFTs/lipse were WNL  - He will let me know if this happens again    2  Gastric wall thickening  - Chronically noted on imaging  - EUS in 2020 done for further evaluation which showed gastric wall thickening arising from the mucosa, biopsies unremarkable and no thickening/lesions of the submucosal or muscularis layers  - Continue PPI, he reports that his reflux symptoms are well controlled      3  Fatty liver  4  Enlarged liver  - He likely is developing cirrhosis from hepatic steatosis in the setting of hypertriglyceridemia, central adiposity and we discussed this extensively  - He is working on losing weight and is now under 200 lbs  - Workup for other causes of cirrhosis were negative including viral hepatitis, AIH; waiting for hemochromatosis mutation testing results due to significantly elevated ferritin but low suspicion for this  - Discussed complete alcohol cessation  - Abdominal imaging q6 months  - He has no HE, ascites/LE edema  - EGD last year did not show portal hypertensive changes so unclear if he just has severe steatosis with developing portal HTN without true cirrhosis at this point      5  Familial hypertriglyceridemia  6   Chronic pancreatitis (HCC)  - Check TG and A1C; he is seeing endocrine at the end of April  - Increase creon to 72K U with one meal and continue 36K U with his other meal; he only eats twice daily and reports urgent diarrhea at least 1x/week which may be from his chronic pancreatitis  - Smoking cessation; he quit last week  - No alcohol use    Follow up in 6 months   ______________________________________________________________________    SUBJECTIVE:  Claudeen Cone is a 47 yo M with a PMH of chronic pancreatitis, hypertriglyceridemia, HTN, GERD, chronic gastritis, tobacco use, presenting for hospital follow up after he was seen for severe RUQ pain about 10 days ago as well as imaging in the past 6 months which showed possible development of cirrhosis with nodular contours and hepatosplenomegaly  He reports that he was seen in the ER on March 12th and 13th for severe right upper quadrant pain which was not resolving  This was not associated with nausea, vomiting, changes in bowel habits  He had imaging done which showed hepatosplenomegaly but no other findings to explain his symptoms  He has chronic gastric wall thickening on imaging is had evaluation for this with an endoscopic ultrasound the past    He reports that he will occasionally get some mild discomfort in that area but nothing like what brought him to the ER  He reports that his reflux is controlled on pantoprazole  He is on Creon 57935 units twice daily  He will have urgent diarrhea at least once a week where 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  There is no black or bloody stool  He stopped smoking last week  He is working on losing weight and eating healthier  He still will have several beers on a Sunday but denies alcohol use other than that  REVIEW OF SYSTEMS IS OTHERWISE NEGATIVE        Historical Information   Past Medical History:   Diagnosis Date    Asthma     Chronic pain disorder     Diabetes mellitus ([address] )     GERD (gastroesophageal reflux disease)     Hyperlipidemia     Liver abscess     Meniscus tear     left knee work injury last assessed 08/24/2016    Pancreatitis     Pneumonia      Past Surgical History:   Procedure Laterality Date    CELIAC PLEXUS BLOCK Bilateral 10/29/2018    Procedure: SPLANCHNIC NERVE BLOCK;  Surgeon: Dani Wang MD; [facility];  Service: Pain Management     CELIAC PLEXUS BLOCK Bilateral 1/10/2019    Procedure: SPLANCHNIC NERVE BLOCK;  Surgeon: Dani Wang MD;  [facility];  Service: Pain Management     CHOLECYSTECTOMY      ESOPHAGOGASTRODUODENOSCOPY N/A 11/16/2017    Procedure: ESOPHAGOGASTRODUODENOSCOPY (EGD); Surgeon: Matilde López MD;  [facility]; Service: Gastroenterology    ESOPHAGOGASTRODUODENOSCOPY N/A 4/19/2018    Procedure: ESOPHAGOGASTRODUODENOSCOPY (EGD); Surgeon: Adam Gurrola MD;  [facility]; Service: Gastroenterology    ESOPHAGOGASTRODUODENOSCOPY N/A 5/10/2018    Procedure: ESOPHAGOGASTRODUODENOSCOPY (EGD); Surgeon: Danyel Saba MD;  [facility];   Service: Gastroenterology    IR TEMPORARY DIALYSIS CATHETER PLACEMENT  2/28/2019    KNEE ARTHROSCOPY Bilateral     KNEE ARTHROSCOPY Right 2009    cibishino last assessed 08/24/2016    KNEE ARTHROSCOPY Right 07/01/2019    LIVER SURGERY      NERVE BLOCK Bilateral 5/29/2018    Procedure: SPLANCHNIC NERVE BLOCK;  Surgeon: Dani Wang MD;  [facility];  Service: Pain Management     PANCREAS SURGERY      stents    PANCREATIC CYST EXCISION      WV INJECT NERV BLCK,CELIAC PLEXUS Bilateral 5/9/2017    Procedure: CELIAC PLEXUS BLOCK ;  Surgeon: Dani Wang MD;  [facility];  Service: Pain Management     WV INJECT NERV BLCK,CELIAC PLEXUS Bilateral 6/1/2017    Procedure: SPLANCHNIC NERVE BLOCK at T12;  Surgeon: Dani Wang MD;  [facility];  Service: Pain Management     WV INJECT NERV BLCK,CELIAC PLEXUS Bilateral 8/8/2017    Procedure: BILATERAL SPLANCHNIC NERVE BLOCK T12;  Surgeon: Dani Wang MD;  [facility];  Service: Pain Management     WV INJECT NERV BLCK,CELIAC PLEXUS Bilateral 4/18/2019    Procedure: BLOCK / INJECTION CELIAC PLEXUS;  Surgeon: Dani Wang MD;  [facility];  Service: Pain Management     WV INJECT NERV [address] Bilateral 8/20/2019    Procedure: SPLANCHNIC NERVE BLOCK;  Surgeon: Ancelmo Barrientos MD;  [facility];  Service: Pain Management     AK INJECT NERV BLCK,CELIAC PLEXUS Bilateral 10/17/2019    Procedure: SPLANCHNIC NERVE BLOCK;  Surgeon: Ancelmo Barrientos MD;  [facility];  Service: Pain Management     AK INJECT NERV Phoebe Perone Bilateral 12/28/2017    Procedure: SPLANCHNIC NERVE BLOCK;  Surgeon: Ancelmo Barrientos MD;  [facility];  Service: Pain Management     AK LAP,CHOLECYSTECTOMY N/A 2/14/2017    Procedure: LAPAROSCOPIC CHOLECYSTECTOMY, IOC, POSSIBLE OPEN ;  Surgeon: Edward Lou MD;  [facility];  Service: General    ROTATOR CUFF REPAIR Right     SHOULDER ARTHROSCOPY      SHOULDER ARTHROSCOPY Right      Social History   Social History     Substance and Sexual Activity   Alcohol Use Yes    Alcohol/week: 10 0 standard drinks    Types: 10 Cans of beer per week    Frequency: 2-4 times a month    Drinks per session: 1 or 2    Comment: quit     Social History     Substance and Sexual Activity   Drug Use No     Social History     Tobacco Use   Smoking Status Current Every Day Smoker    Packs/day: 1 00    Years: 20 00    Pack years: 20 00   Smokeless Tobacco Never Used     Family History   Problem Relation Age of Onset    Cirrhosis Mother     Heart disease Other         cardiac disorder    Cancer Other        Meds/Allergies       Current Outpatient Medications:     albuterol (PROVENTIL HFA,VENTOLIN HFA) 90 mcg/act inhaler    amLODIPine (NORVASC) 5 mg tablet    Choline Fenofibrate (Fenofibric Acid) 135 MG CPDR    diclofenac sodium (VOLTAREN) 1 %    dicyclomine (BENTYL) 20 mg tablet    EPIPEN 2-ARIEL 0 3 MG/0 3ML SOAJ    esomeprazole (NexIUM) 40 MG capsule    famotidine (PEPCID) 40 MG tablet    insulin degludec Sweta Poser FlexTouch) 100 units/mL injection pen    Lidocaine-Menthol 4-1 % GEL    lisinopril (ZESTRIL) 20 mg tablet    omega-3-acid ethyl esters (LOVAZA) 1 g capsule    omeprazole (PriLOSEC) 20 mg delayed release capsule    ondansetron (ZOFRAN-ODT) 4 mg disintegrating tablet    oxyCODONE-acetaminophen (PERCOCET) 5-325 mg per tablet    pancrelipase, Lip-Prot-Amyl, (Creon) 12,000 units capsule    predniSONE 20 mg tablet    pregabalin (LYRICA) 100 mg capsule    rosuvastatin (CRESTOR) 40 MG tablet    traZODone (DESYREL) 50 mg tablet    Pancrelipase, Lip-Prot-Amyl, (Creon) 27752 units CPEP    Allergies   Allergen Reactions    Bee Venom Swelling           Objective     Blood pressure 122/80, pulse 74, height 5' 8" (1 727 m), weight 89 4 kg (197 lb 3 2 oz)  Body mass index is 29 98 kg/m²  PHYSICAL EXAM:      General Appearance:   Alert, cooperative, no distress   HEENT:   Normocephalic, atraumatic, anicteric      Neck:  Supple, symmetrical, trachea midline   Lungs:   Clear to auscultation bilaterally; no rales, rhonchi or wheezing; respirations unlabored    Heart[de-identified]   Regular rate and rhythm; no murmur, rub, or gallop  Abdomen:   Soft, non-tender, non-distended; normal bowel sounds; no masses, no organomegaly    Genitalia:   Deferred    Rectal:   Deferred    Extremities:  No cyanosis, clubbing or edema    Pulses:  2+ and symmetric    Skin:  No jaundice, rashes, or lesions    Lymph nodes:  No palpable cervical lymphadenopathy        Lab Results:   No visits with results within 1 Day(s) from this visit     Latest known visit with results is:   Admission on 03/13/2021, Discharged on 03/13/2021   Component Date Value    WBC 03/13/2021 5 09     RBC 03/13/2021 4 34     Hemoglobin 03/13/2021 14 1     Hematocrit 03/13/2021 42 9     MCV 03/13/2021 99*    MCH 03/13/2021 32 5     MCHC 03/13/2021 32 9     RDW 03/13/2021 13 0     MPV 03/13/2021 11 8     Platelets 65/77/3018 119*    nRBC 03/13/2021 0     Neutrophils Relative 03/13/2021 56     Immat GRANS % 03/13/2021 0     Lymphocytes Relative 03/13/2021 31     Monocytes Relative 03/13/2021 10     Eosinophils Relative 03/13/2021 2     Basophils Relative 03/13/2021 1     Neutrophils Absolute 03/13/2021 2 88     Immature Grans Absolute 03/13/2021 0 02     Lymphocytes Absolute 03/13/2021 1 56     Monocytes Absolute 03/13/2021 0 50     Eosinophils Absolute 03/13/2021 0 10     Basophils Absolute 03/13/2021 0 03     Sodium 03/13/2021 142     Potassium 03/13/2021 3 5     Chloride 03/13/2021 108     CO2 03/13/2021 27     ANION GAP 03/13/2021 7     BUN 03/13/2021 11     Creatinine 03/13/2021 0 61     Glucose 03/13/2021 82     Calcium 03/13/2021 8 1*    Corrected Calcium 03/13/2021 8 6     AST 03/13/2021 25     ALT 03/13/2021 39     Alkaline Phosphatase 03/13/2021 83     Total Protein 03/13/2021 6 0*    Albumin 03/13/2021 3 4*    Total Bilirubin 03/13/2021 0 67     eGFR 03/13/2021 118     Lipase 03/13/2021 50*    Color, UA 03/13/2021 Yellow     Clarity, UA 03/13/2021 Clear     Specific Gravity, UA 03/13/2021 1 020     pH, UA 03/13/2021 7 0     Leukocytes, UA 03/13/2021 Negative     Nitrite, UA 03/13/2021 Negative     Protein, UA 03/13/2021 Negative     Glucose, UA 03/13/2021 Negative     Ketones, UA 03/13/2021 Negative     Urobilinogen, UA 03/13/2021 0 2     Bilirubin, UA 03/13/2021 Negative     Blood, UA 03/13/2021 Negative          Radiology Results:   Mri Cervical Spine Wo Contrast    Result Date: 3/16/2021  Narrative: MRI CERVICAL SPINE WITHOUT CONTRAST INDICATION: Evaluate for possible herniated disc  Patient has trapezius muscle spasm and spasm of the rhomboid muscle  COMPARISON:  None  TECHNIQUE:  Sagittal T1, sagittal T2, sagittal inversion recovery, axial T2, axial  2D merge Imaging performed on 1 5T MRI  IMAGE QUALITY:  Diagnostic FINDINGS: ALIGNMENT:  Straightening of the cervical spine without subluxation  Vertebral body stature is preserved  MARROW SIGNAL:  Endplate degenerative changes with mixed type I and type II Modic signal noted at C5-C6    Mixed type II and type III Modic degenerative changes noted at C6-C7  CERVICAL AND VISUALIZED THORACIC CORD:  Normal signal within the visualized cord  PREVERTEBRAL AND PARASPINAL SOFT TISSUES:  Normal  VISUALIZED POSTERIOR FOSSA:  The visualized posterior fossa demonstrates no abnormal signal  CERVICAL DISC SPACES:  Disc space narrowing at C5-C6 and C6-C7  C2-C3:  Moderate right facet arthropathy and hypertrophy with mild perifacet soft tissue edema  Correlate for active facet arthropathy  No spinal canal or foraminal stenosis  C3-C4:  Shallow central protrusion and left facet arthropathy with moderate to severe left foraminal stenosis  Mild spinal stenosis  Patent right neural foramina  C4-C5:  Left facet arthropathy with mild left foraminal stenosis  C5-C6:  Posterior disc osteophyte complex with right greater than left uncovertebral hypertrophy and suggestion of a right foraminal disc osteophyte complex resulting in moderate right foraminal stenosis  Patent spinal canal and left neural foramina  C6-C7:  Broad posterior protrusion with mild ventral indentation on the thecal sac and no greater than mild spinal stenosis  Patent neural foramina  C7-T1:  Normal  UPPER THORACIC DISC SPACES:  Normal      Impression: Cervical spondylosis with mild spinal stenosis and variable degrees of facet arthropathy and foraminal narrowing as described above  Please see above level by level discussion  Facet arthropathy is most pronounced on the right at C2-C3 where there is mild perifacet soft tissue edema  Correlate for active facet arthropathy at this level  Workstation performed: [id number]     Ct Abdomen Pelvis With Contrast    Result Date: 3/13/2021  Narrative: CT ABDOMEN AND PELVIS WITH IV CONTRAST INDICATION:   generalized abd pain  COMPARISON:  12/14/2020 TECHNIQUE:  CT examination of the abdomen and pelvis was performed   Axial, sagittal, and coronal 2D reformatted images were created from the source data and submitted for interpretation  Radiation dose length product (DLP) for this visit:  697 11 mGy-cm   This examination, like all CT scans performed in the [facility], was performed utilizing techniques to minimize radiation dose exposure, including the use of iterative  reconstruction and automated exposure control  IV Contrast:  100 mL of iohexol (OMNIPAQUE) Enteric Contrast:  Enteric contrast was not administered  FINDINGS: ABDOMEN LOWER CHEST:  No clinically significant abnormality identified in the visualized lower chest  LIVER/BILIARY TREE: Liver is enlarged  Liver is diffusely decreased in density consistent with fatty change  No CT evidence of suspicious hepatic mass  Normal hepatic contours  No biliary dilatation  GALLBLADDER:  Gallbladder is surgically absent  SPLEEN:  Spleen is enlarged PANCREAS:  Unremarkable  ADRENAL GLANDS:  Unremarkable  KIDNEYS/URETERS:  Unremarkable  No hydronephrosis  STOMACH AND BOWEL:  Persistent gastric wall thickening concerning for gastritis  APPENDIX:  A normal appendix was visualized  ABDOMINOPELVIC CAVITY:  Trace ascites  No pneumoperitoneum  No lymphadenopathy  VESSELS:  Unremarkable for patient's age  PELVIS REPRODUCTIVE ORGANS:  Unremarkable for patient's age  URINARY BLADDER:  Unremarkable  ABDOMINAL WALL/INGUINAL REGIONS:  Small fat-containing periumbilical hernia    OSSEOUS STRUCTURES:  No acute fracture or destructive osseous lesion  Impression: Persistent gastric wall thickening concerning for gastritis  Fatty enlarged liver and splenomegaly  Trace ascites of unknown origin   Workstation performed: [id number]
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
## 2021-03-30 DIAGNOSIS — Z23 ENCOUNTER FOR IMMUNIZATION: ICD-10-CM

## 2021-03-30 DIAGNOSIS — I10 ESSENTIAL HYPERTENSION: ICD-10-CM

## 2021-03-30 DIAGNOSIS — E78.2 MIXED HYPERLIPIDEMIA: ICD-10-CM

## 2021-03-30 RX ORDER — AMLODIPINE BESYLATE 5 MG/1
TABLET ORAL
Qty: 30 TABLET | Refills: 0 | Status: SHIPPED | OUTPATIENT
Start: 2021-03-30 | End: 2021-04-29

## 2021-03-30 RX ORDER — ROSUVASTATIN CALCIUM 40 MG/1
TABLET, COATED ORAL
Qty: 90 TABLET | Refills: 0 | Status: SHIPPED | OUTPATIENT
Start: 2021-03-30 | End: 2021-07-14

## 2021-03-30 RX ORDER — LISINOPRIL 20 MG/1
TABLET ORAL
Qty: 30 TABLET | Refills: 0 | Status: SHIPPED | OUTPATIENT
Start: 2021-03-30 | End: 2021-04-29

## 2021-04-07 ENCOUNTER — OFFICE VISIT (OUTPATIENT)
Dept: PAIN MEDICINE | Facility: CLINIC | Age: 46
End: 2021-04-07
Payer: COMMERCIAL

## 2021-04-07 VITALS
RESPIRATION RATE: 16 BRPM | SYSTOLIC BLOOD PRESSURE: 144 MMHG | BODY MASS INDEX: 30.68 KG/M2 | HEART RATE: 74 BPM | WEIGHT: 202.4 LBS | DIASTOLIC BLOOD PRESSURE: 53 MMHG | HEIGHT: 68 IN

## 2021-04-07 DIAGNOSIS — M54.12 RADICULOPATHY, CERVICAL REGION: ICD-10-CM

## 2021-04-07 DIAGNOSIS — M50.30 DEGENERATIVE DISC DISEASE, CERVICAL: ICD-10-CM

## 2021-04-07 DIAGNOSIS — M47.812 ARTHROPATHY OF CERVICAL FACET JOINT: Primary | ICD-10-CM

## 2021-04-07 PROCEDURE — 99244 OFF/OP CNSLTJ NEW/EST MOD 40: CPT | Performed by: STUDENT IN AN ORGANIZED HEALTH CARE EDUCATION/TRAINING PROGRAM

## 2021-04-07 NOTE — PATIENT INSTRUCTIONS
Epidural Steroid Injection   AMBULATORY CARE:   What you need to know about an epidural steroid injection (ARNEL):  An ARNEL is a procedure to inject steroid medicine into the epidural space  The epidural space is between your spinal cord and vertebrae  Steroids reduce inflammation and fluid buildup in your spine that may be causing pain  You may be given pain medicine along with the steroids  How to prepare for an ARNEL:  Your healthcare provider will talk to you about how to prepare for your procedure  He or she will tell you what medicines to take or not take on the day of your procedure  You may need to stop taking blood thinners or other medicines several days before your procedure  You may need to adjust any diabetes medicine you take on the day of your procedure  Steroid medicine can increase your blood sugar level  Arrange for someone to drive you home when you are discharged  What will happen during an ARNEL:   · You will be given medicine to numb the procedure area  You will be awake for the procedure, but you will not feel pain  You may also be given medicine to help you relax  Contrast liquid will be used to help your healthcare provider see the area better  Tell the healthcare provider if you have ever had an allergic reaction to contrast liquid  · Your healthcare provider may place the needle into your neck area, middle of your back, or tailbone area  He may inject the medicine next to the nerves that are causing your pain  He may instead inject the medicine into a larger area of the epidural space  This helps the medicine spread to more nerves  Your healthcare provider will use a fluoroscope to help guide the needle to the right place  A fluoroscope is a type of x-ray  After the procedure, a bandage will be placed over the injection site to prevent infection  What will happen after an ARNEL:  You will have a bandage over the injection site to prevent infection   Your healthcare provider will tell you when you can bathe and any activity guidelines  You will be able to go home  Risks of an ARNEL:  You may have temporary or permanent nerve damage or paralysis  You may have bleeding or develop a serious infection, such as meningitis (swelling of the brain coverings)  An abscess may also develop  An abscess is a pus-filled area under the skin  You may need surgery to fix the abscess  You may have a seizure, anxiety, or trouble sleeping  If you are a man, you may have temporary erectile dysfunction (not able to have an erection)  Call your local emergency number (911 in the [address] [place] [address],[address]) if:   · You have a seizure  · You have trouble moving your legs  Seek care immediately if:   · Blood soaks through your bandage  · You have a fever or chills, severe back pain, and the procedure area is sensitive to the touch  · You cannot control when you urinate or have a bowel movement  Call your doctor if:   · You have weakness or numbness in your legs  · Your wound is red, swollen, or draining pus  · You have nausea or are vomiting  · Your face or neck is red and you feel warm  · You have more pain than you had before the procedure  · You have swelling in your hands or feet  · You have questions or concerns about your condition or care  Care for your wound as directed: You may remove the bandage before you go to bed the day of your procedure  You may take a shower, but do not take a bath for at least 24 hours  Self-care:   · Do not drive,  use machines, or do strenuous activity for 24 hours after your procedure or as directed  · Continue other treatments  as directed  Steroid injections alone will not control your pain  The injections are meant to be used with other treatments, such as physical therapy  Follow up with your doctor as directed:  Write down your questions so you remember to ask them during your visits     © Copyright [employer] 2020 Information is for End User's use only and may not be sold, redistributed or otherwise used for commercial purposes  All illustrations and images included in CareNotes® are the copyrighted property of [employer] M [employer]  or Kady Estrada  The above information is an  only  It is not intended as medical advice for individual conditions or treatments  Talk to your doctor, nurse or pharmacist before following any medical regimen to see if it is safe and effective for you

## 2021-04-07 NOTE — PROGRESS NOTES
Pain Medicine Follow-Up Note    Assessment:  1  Arthropathy of cervical facet joint    2  Radiculopathy, cervical region    3  Degenerative disc disease, cervical        Plan:  Orders Placed This Encounter   Procedures    FL spine and pain procedure     Standing Status:   Future     Standing Expiration Date:   4/7/2025     Order Specific Question:   Reason for Exam:     Answer:   C6-7 MARY     Order Specific Question:   Anticoagulant hold needed? Answer:   No       No orders of the defined types were placed in this encounter  My impressions and treatment recommendations were discussed in detail with the patient who verbalized understanding and had no further questions  This is a 59-year-old male who returns to our office with new chief complaint of chronic neck pain and   Bilateral cervical radiculopathy  He has MRI finding of cervical spondylosis, and facet arthropathy throughout the cervical spine, most notable on the right at the C2-3 area  Most of his neck pain is below that area, more so center around the C6/C7 region  He does have varying degrees of foraminal narrowing as well  He had positive facet loading on exam indicative of notable source of axial back pain  He does appear to being quite notable pain today  Discussed that given radiculopathy symptoms,  Best initial course of interventional treatment would likely be cervical epidural steroid injection  However, he would definitely be candidate for MBB /RFA  He will otherwise continue Percocet and Lyrica as per PCP  Therefore, we discussed interlaminar C6-7 cervical epidural steroid injection fluoroscopic guidance  Complete risks and benefits including bleeding, infection, tissue reaction, nerve injury and allergic reaction were discussed  The approach was demonstrated using models and literature was provided  Verbal and written consent was obtained      [address] [facility] Prescription Drug Monitoring Program report was reviewed and was appropriate     Discharge instructions were provided  I personally saw and examined the patient and I agree with the above discussed plan of care  History of Present Illness:    Sherri Edwards is a 48 y o  male who presents to [facility] for interval re-evaluation of the above stated pain complaints  The patient has a past medical and chronic pain history as outlined in the assessment section  He was last seen on  09/23/2020 at which time he was referred to interventional radiology for splanchnic nerve block  He returns today with new chief complaint of bilateral neck pain  This is a chronic issue for the last 4 months  He reports that this is due to his job as a   Reports that he had some pot holes while driving  Reportedly drives 315-336 miles a day  Since that time has had significant neck pain  Was initially seen by Dr Andrea Hebert and underwent MRI and referred to us  The pain is moderate to severe  Current pain is 8/10 with interference with daily activities up to 10/10  Pain is nearly constant  He describes it as sharp and cutting in nature  his pain radiates down the bilateral upper extremities to the biceps region  Pain is increased with lying down, standing, bending, exercising, coughing, sneezing  No change with sitting, walking, bowel movement  Decreased with relaxation  Has MRI of the cervical spine demonstrating multilevel degenerative disc disease and facet arthropathy  Also has  Various degrees of foraminal narrowing  Other than as stated above, the patient denies any interval changes in medications, medical condition, mental condition, symptoms, or allergies since the last office visit  Review of Systems:    Review of Systems   Respiratory: Negative for shortness of breath  Cardiovascular: Negative for chest pain  Gastrointestinal: Negative for constipation, diarrhea, nausea and vomiting     Musculoskeletal: Positive for myalgias  Negative for arthralgias, gait problem and joint swelling  Skin: Negative for rash  Neurological: Positive for headaches  Negative for dizziness, seizures and weakness  All other systems reviewed and are negative  Patient Active Problem List   Diagnosis    Pancreatic lesion    Renal mass    Encounter for smoking cessation counseling    Leukocytosis    RBBB    Epigastric pain    Acute gastritis without hemorrhage    Generalized abdominal pain    Abnormal transaminases    Acute on chronic pancreatitis (HCC)    Leukopenia    Hypertriglyceridemia    Chronic pain syndrome    Esophagitis    Other chronic pancreatitis (HCC)    Chronic gastritis without bleeding    Uncomplicated opioid dependence ([facility] [address] )    Long-term current use of opiate analgesic    GERD (gastroesophageal reflux disease)    Hyponatremia    Healthcare maintenance    Elevated blood pressure reading    Upper abdominal pain    Hypertension    Pancreatitis, unspecified pancreatitis type    Abdominal pain, epigastric    Change in bowel habits    Musculoskeletal neck pain       Past Medical History:   Diagnosis Date    Asthma     Chronic pain disorder     [facility] mellitus ([email or web address] )     GERD (gastroesophageal reflux disease)     Hyperlipidemia     Liver abscess     Meniscus tear     left knee work injury last assessed 08/24/2016    Pancreatitis     Pneumonia        Past Surgical History:   Procedure Laterality Date    CELIAC PLEXUS BLOCK Bilateral 10/29/2018    Procedure: SPLANCHNIC NERVE BLOCK;  Surgeon: Claudia Carr MD;  [facility];  Service: Pain Management     CELIAC PLEXUS BLOCK Bilateral 1/10/2019    Procedure: SPLANCHNIC NERVE BLOCK;  Surgeon: Claudia Carr MD;  [facility];  Service: Pain Management     CHOLECYSTECTOMY      ESOPHAGOGASTRODUODENOSCOPY N/A 11/16/2017    Procedure: ESOPHAGOGASTRODUODENOSCOPY (EGD); Surgeon: Chepe Urrutia MD;  [facility];   Service: Gastroenterology    ESOPHAGOGASTRODUODENOSCOPY N/A 4/19/2018    Procedure: ESOPHAGOGASTRODUODENOSCOPY (EGD); Surgeon: Oleksandr Ramirez MD;  [facility]; Service: Gastroenterology    ESOPHAGOGASTRODUODENOSCOPY N/A 5/10/2018    Procedure: ESOPHAGOGASTRODUODENOSCOPY (EGD); Surgeon: Mahendra Costa MD;  [facility];   Service: Gastroenterology    IR TEMPORARY DIALYSIS CATHETER PLACEMENT  2/28/2019    KNEE ARTHROSCOPY Bilateral     KNEE ARTHROSCOPY Right 2009    cibishino last assessed 08/24/2016    KNEE ARTHROSCOPY Right 07/01/2019    LIVER SURGERY      NERVE BLOCK Bilateral 5/29/2018    Procedure: SPLANCHNIC NERVE BLOCK;  Surgeon: Iris Robison MD;  [facility];  Service: Pain Management     PANCREAS SURGERY      stents    PANCREATIC CYST EXCISION      IN INJECT NERV BLCK,CELIAC PLEXUS Bilateral 5/9/2017    Procedure: CELIAC PLEXUS BLOCK ;  Surgeon: Iris Robison MD;  [facility];  Service: Pain Management     IN INJECT NERV BLCK,CELIAC PLEXUS Bilateral 6/1/2017    Procedure: SPLANCHNIC NERVE BLOCK at T12;  Surgeon: Iris Robison MD;  [facility];  Service: Pain Management     IN INJECT NERV BLCK,CELIAC PLEXUS Bilateral 8/8/2017    Procedure: BILATERAL SPLANCHNIC NERVE BLOCK T12;  Surgeon: Iris Robison MD;  [facility];  Service: Pain Management     IN INJECT NERV BLCK,CELIAC PLEXUS Bilateral 4/18/2019    Procedure: BLOCK / INJECTION CELIAC PLEXUS;  Surgeon: Iris Robison MD;  [facility];  Service: Pain Management     IN INJECT NERV BLCK,CELIAC PLEXUS Bilateral 8/20/2019    Procedure: SPLANCHNIC NERVE BLOCK;  Surgeon: Iris Robison MD;  [facility];  Service: Pain Management     IN INJECT NERV BLCK,CELIAC PLEXUS Bilateral 10/17/2019    Procedure: SPLANCHNIC NERVE BLOCK;  Surgeon: Iris Robison MD;  [facility];  Service: Pain Management     IN INJECT NERV BLCK,PARAVERT SYMPATH Bilateral 12/28/2017    Procedure: SPLANCHNIC NERVE BLOCK;  Surgeon: Joaquin Moon MD;  [facility];  Service: Pain Management     [place] LAP,CHOLECYSTECTOMY N/A 2017    Procedure: LAPAROSCOPIC CHOLECYSTECTOMY, IOC, POSSIBLE OPEN ;  Surgeon: Kamryn Richey MD;  [facility];  Service: General    ROTATOR CUFF REPAIR Right     SHOULDER ARTHROSCOPY      SHOULDER ARTHROSCOPY Right        Family History   Problem Relation Age of Onset    Cirrhosis Mother     Heart disease Other         cardiac disorder    Cancer Other        Social History     Occupational History    Occupation: fulltime employment   Tobacco Use    Smoking status: Former Smoker     Packs/day:      Years:      Pack years: 20      Quit date: 3/15/2021     Years since quittin 0    Smokeless tobacco: Never Used   Substance and Sexual Activity    Alcohol use:  Yes     Alcohol/week: 10 0 standard drinks     Types: 10 Cans of beer per week     Frequency: 2-4 times a month     Drinks per session: 1 or 2     Comment: quit    Drug use: No    Sexual activity: Yes     Partners: Female         Current Outpatient Medications:     albuterol (PROVENTIL HFA,VENTOLIN HFA) 90 mcg/act inhaler, INHALE TWO PUFFS BY MOUTH EVERY 6 HOURS AS NEEDED FOR WHEEZING, Disp: 54 g, Rfl: 0    amLODIPine (NORVASC) 5 mg tablet, TAKE ONE TABLET BY MOUTH ONCE A DAY , Disp: 30 tablet, Rfl: 0    Choline Fenofibrate (Fenofibric Acid) 135 MG CPDR, Take 1 capsule (135 mg total) by mouth daily, Disp: 90 capsule, Rfl: 2    diclofenac sodium (VOLTAREN) 1 %, diclofenac 1 % topical gel  APPLY 2 GRAMS TO THE AFFECTED AREA(S) BY TOPICAL ROUTE 4 TIMES PER DAY, Disp: , Rfl:     dicyclomine (BENTYL) 20 mg tablet, Take 1 tablet (20 mg total) by mouth 2 (two) times a day, Disp: 20 tablet, Rfl: 0    EPIPEN 2-ARIEL 0 3 MG/0 3ML SOAJ, Inject one syringe (0 3mg) intramuscularly once as directed, Disp: 2 each, Rfl: 0    esomeprazole (NexIUM) 40 MG capsule, Take 1 capsule (40 mg total) by mouth 2 (two) times a day before meals, Disp: 60 capsule, Rfl: 5    famotidine (PEPCID) 40 MG tablet, Take 40 mg by mouth 2 (two) times a day, Disp: , Rfl:     insulin degludec [place] Rise FlexTouch) 100 units/mL injection pen, 20 units subcut in hs, Disp: , Rfl:     Lidocaine-Menthol 4-1 % GEL, NuLido 4 %-1 % topical gel  Apply to affected area 2-4 times a day as needed for pain, Disp: , Rfl:     lisinopril (ZESTRIL) 20 mg tablet, TAKE ONE TABLET BY MOUTH ONCE A DAY , Disp: 30 tablet, Rfl: 0    omega-3-acid ethyl esters (LOVAZA) 1 g capsule, TAKE TWO CAPSULES BY MOUTH TWICE DAILY , Disp: 360 capsule, Rfl: 0    omeprazole (PriLOSEC) 20 mg delayed release capsule, Take 20 mg by mouth daily, Disp: , Rfl:     ondansetron (ZOFRAN-ODT) 4 mg disintegrating tablet, DISSOLVE ONE TABLET IN MOUTH EVERY EIGHT HOURS AS NEEDED NAUSEA AND VOMITING , Disp: 30 tablet, Rfl: 0    oxyCODONE-acetaminophen (PERCOCET) 5-325 mg per tablet, Take 1 tablet by mouth every 6 (six) hours as needed for moderate painMax Daily Amount: 4 tablets, Disp: 20 tablet, Rfl: 0    pancrelipase, Lip-Prot-Amyl, (Creon) 12,000 units capsule, Take 12,000 units of lipase by mouth as needed for snacks, Disp: 90 capsule, Rfl: 2    Pancrelipase, Lip-Prot-Amyl, (Creon) 49858 units CPEP, Take 1 capsule (36,000 Units total) by mouth 3 (three) times a day before meals, Disp: 90 capsule, Rfl: 3    predniSONE 20 mg tablet, Take 1 tablet (20 mg total) by mouth daily, Disp: 5 tablet, Rfl: 0    pregabalin (LYRICA) 100 mg capsule, TAKE ONE CAPSULE BY MOUTH TWICE DAILY , Disp: 90 capsule, Rfl: 0    rosuvastatin (CRESTOR) 40 MG tablet, TAKE ONE TABLET BY MOUTH ONCE A DAY , Disp: 90 tablet, Rfl: 0    traZODone (DESYREL) 50 mg tablet, TAKE ONE TABLET BY MOUTH NIGHTLY AT BEDTIME , Disp: 30 tablet, Rfl: 0    Allergies   Allergen Reactions    Bee Venom Swelling       Physical Exam:    /53   Pulse 74   Resp 16   Ht 5' 8" (1 727 m)   Wt 91 8 kg (202 lb 6 4 oz)   BMI 30 77 kg/m²     Constitutional:normal, well developed, well nourished, alert, in no distress and non-toxic and no overt pain behavior  Eyes:anicteric  HEENT:grossly intact  Neck:supple, symmetric, trachea midline and no masses   Pulmonary:even and unlabored  Cardiovascular:No edema or pitting edema present  Skin:Normal without rashes or lesions and well hydrated  Psychiatric:Mood and affect appropriate  Neurologic:Cranial Nerves II-XII grossly intact  Musculoskeletal:normal     Cervical Spine Exam    Appearance:  Normal lordosis  Palpation/Tenderness:  left cervical paraspinal tenderness  right cervical paraspinal tenderness  left trapezium tenderness  right trapezium tenderness  Sensory:  no sensory deficits noted  Range of Motion:  Flexion: Moderately limited  with pain  Extension:  Moderately limited  with pain  Lateral Flexion - Left:  Moderately limited  with pain  Lateral Flexion - Right:  Moderately limited  with pain  Rotation - Left:  Moderately limited  with pain  Rotation - Right:  Moderately limited  with pain  Motor Strength:  Left Arm Flexion  5/5  Left Arm Extension  5/5  Right Arm Flexion  5/5  Right Arm Extension  5/5  Left Wrist Flexion  4/5  Left Wrist Extension  4/5  Left Finger Abduction  5/5  Right Finger Abduction  5/5  Left Pincer Grasp  5/5  Right Pincer Grasp  5/5  Left    5/5  Right   5/5  Reflexes:  Left Patellar:  2+   Right Patellar:  2+   Left Achilles:  2+   Right Achilles:  2+   Special Tests:  Left Spurlings:  negative  Right Spurlings  negative      Imaging    MRI CERVICAL SPINE WITHOUT CONTRAST     INDICATION: Evaluate for possible herniated disc    Patient has trapezius muscle spasm and spasm of the rhomboid muscle      COMPARISON:  None      TECHNIQUE:  Sagittal T1, sagittal T2, sagittal inversion recovery, axial T2, axial  2D merge  Imaging performed on 1 5T MRI    IMAGE QUALITY:  Diagnostic     FINDINGS:     ALIGNMENT:  Straightening of the cervical spine without subluxation  Vertebral body stature is preserved      MARROW SIGNAL:  Endplate degenerative changes with mixed type I and type II Modic signal noted at C5-C6  Mixed type II and type III Modic degenerative changes noted at C6-C7      CERVICAL AND VISUALIZED THORACIC CORD:  Normal signal within the visualized cord      PREVERTEBRAL AND PARASPINAL SOFT TISSUES:  Normal      VISUALIZED POSTERIOR FOSSA:  The visualized posterior fossa demonstrates no abnormal signal      CERVICAL DISC SPACES:  Disc space narrowing at C5-C6 and C6-C7      C2-C3:  Moderate right facet arthropathy and hypertrophy with mild perifacet soft tissue edema  Correlate for active facet arthropathy  No spinal canal or foraminal stenosis      C3-C4:  Shallow central protrusion and left facet arthropathy with moderate to severe left foraminal stenosis  Mild spinal stenosis  Patent right neural foramina      C4-C5:  Left facet arthropathy with mild left foraminal stenosis      C5-C6:  Posterior disc osteophyte complex with right greater than left uncovertebral hypertrophy and suggestion of a right foraminal disc osteophyte complex resulting in moderate right foraminal stenosis  Patent spinal canal and left neural foramina      C6-C7:  Broad posterior protrusion with mild ventral indentation on the thecal sac and no greater than mild spinal stenosis  Patent neural foramina      C7-T1:  Normal      UPPER THORACIC DISC SPACES:  Normal      IMPRESSION:     Cervical spondylosis with mild spinal stenosis and variable degrees of facet arthropathy and foraminal narrowing as described above  Please see above level by level discussion      Facet arthropathy is most pronounced on the right at C2-C3 where there is mild perifacet soft tissue edema    Correlate for active facet arthropathy at this level      FL spine and pain procedure    (Results Pending)         Orders Placed This Encounter   Procedures    FL spine and pain procedure

## 2021-04-07 NOTE — LETTER
April 7, 2021     Tyree GarciadouglasDO [address]  [address]  [place] [id number]    Patient: Franco Swain   YOB: 1975   Date of Visit: 4/7/2021       Dear Dr Pedro Ruiz:    Thank you for referring Franco Swain to me for evaluation  Below are my notes for this consultation  If you have questions, please do not hesitate to call me  I look forward to following your patient along with you  Sincerely,        Marilin Villela MD        CC: No Recipients  Marilin Villela MD  4/7/2021  9:44 AM  Signed  Pain Medicine Follow-Up Note    Assessment:  1  Arthropathy of cervical facet joint    2  Radiculopathy, cervical region    3  Degenerative disc disease, cervical        Plan:  Orders Placed This Encounter   Procedures    FL spine and pain procedure     Standing Status:   Future     Standing Expiration Date:   4/7/2025     Order Specific Question:   Reason for Exam:     Answer:   C6-7 MARY     Order Specific Question:   Anticoagulant hold needed? Answer:   No       No orders of the defined types were placed in this encounter  My impressions and treatment recommendations were discussed in detail with the patient who verbalized understanding and had no further questions  This is a 24-year-old male who returns to our office with new chief complaint of chronic neck pain and   Bilateral cervical radiculopathy  He has MRI finding of cervical spondylosis, and facet arthropathy throughout the cervical spine, most notable on the right at the C2-3 area  Most of his neck pain is below that area, more so center around the C6/C7 region  He does have varying degrees of foraminal narrowing as well  He had positive facet loading on exam indicative of notable source of axial back pain  He does appear to being quite notable pain today  Discussed that given radiculopathy symptoms,  Best initial course of interventional treatment would likely be cervical epidural steroid injection  However, he would definitely be candidate for MBB /RFA  He will otherwise continue Percocet and Lyrica as per PCP  Therefore, we discussed interlaminar C6-7 cervical epidural steroid injection fluoroscopic guidance  Complete risks and benefits including bleeding, infection, tissue reaction, nerve injury and allergic reaction were discussed  The approach was demonstrated using models and literature was provided  Verbal and written consent was obtained  [place] Prescription Drug Monitoring Program report was reviewed and was appropriate     Discharge instructions were provided  I personally saw and examined the patient and I agree with the above discussed plan of care  History of Present Illness:    Jonathan Smiley is a 48 y o  male who presents to [facility] for interval re-evaluation of the above stated pain complaints  The patient has a past medical and chronic pain history as outlined in the assessment section  He was last seen on  09/23/2020 at which time he was referred to interventional radiology for splanchnic nerve block  He returns today with new chief complaint of bilateral neck pain  This is a chronic issue for the last 4 months  He reports that this is due to his job as a   Reports that he had some pot holes while driving  Reportedly drives [phone] miles a day  Since that time has had significant neck pain  Was initially seen by Dr Damon Bowman and underwent MRI and referred to us  The pain is moderate to severe  Current pain is 8/10 with interference with daily activities up to 10/10  Pain is nearly constant  He describes it as sharp and cutting in nature  his pain radiates down the bilateral upper extremities to the biceps region  Pain is increased with lying down, standing, bending, exercising, coughing, sneezing  No change with sitting, walking, bowel movement  Decreased with relaxation        Has MRI of the cervical spine demonstrating multilevel degenerative disc disease and facet arthropathy  Also has  Various degrees of foraminal narrowing  Other than as stated above, the patient denies any interval changes in medications, medical condition, mental condition, symptoms, or allergies since the last office visit  Review of Systems:    Review of Systems   Respiratory: Negative for shortness of breath  Cardiovascular: Negative for chest pain  Gastrointestinal: Negative for constipation, diarrhea, nausea and vomiting  Musculoskeletal: Positive for myalgias  Negative for arthralgias, gait problem and joint swelling  Skin: Negative for rash  Neurological: Positive for headaches  Negative for dizziness, seizures and weakness  All other systems reviewed and are negative          Patient Active Problem List   Diagnosis    Pancreatic lesion    Renal mass    Encounter for smoking cessation counseling    Leukocytosis    RBBB    Epigastric pain    Acute gastritis without hemorrhage    Generalized abdominal pain    Abnormal transaminases    Acute on chronic pancreatitis (HCC)    Leukopenia    Hypertriglyceridemia    Chronic pain syndrome    Esophagitis    Other chronic pancreatitis (HCC)    Chronic gastritis without bleeding    Uncomplicated opioid dependence ([facility] [address] )    Long-term current use of opiate analgesic    GERD (gastroesophageal reflux disease)    Hyponatremia    Healthcare maintenance    Elevated blood pressure reading    Upper abdominal pain    Hypertension    Pancreatitis, unspecified pancreatitis type    Abdominal pain, epigastric    Change in bowel habits    Musculoskeletal neck pain       Past Medical History:   Diagnosis Date    Asthma     Chronic pain disorder     Diabetes mellitus ([facility] [address] )     GERD (gastroesophageal reflux disease)     Hyperlipidemia     Liver abscess     Meniscus tear     left knee work injury last assessed 08/24/2016    Pancreatitis     Pneumonia Past Surgical History:   Procedure Laterality Date    CELIAC PLEXUS BLOCK Bilateral 10/29/2018    Procedure: SPLANCHNIC NERVE BLOCK;  Surgeon: Espinoza Wilkinson MD;  [facility];  Service: Pain Management     CELIAC PLEXUS BLOCK Bilateral 1/10/2019    Procedure: SPLANCHNIC NERVE BLOCK;  Surgeon: Espinoza Wilkinson MD;  [facility];  Service: Pain Management     CHOLECYSTECTOMY      ESOPHAGOGASTRODUODENOSCOPY N/A 11/16/2017    Procedure: ESOPHAGOGASTRODUODENOSCOPY (EGD); Surgeon: Joslyn Lau MD;  [facility]; Service: Gastroenterology    ESOPHAGOGASTRODUODENOSCOPY N/A 4/19/2018    Procedure: ESOPHAGOGASTRODUODENOSCOPY (EGD); Surgeon: Lilian Bray MD;  [facility]; Service: Gastroenterology    ESOPHAGOGASTRODUODENOSCOPY N/A 5/10/2018    Procedure: ESOPHAGOGASTRODUODENOSCOPY (EGD); Surgeon: Lisa Esquivel MD;  [facility];   Service: Gastroenterology    IR TEMPORARY DIALYSIS CATHETER PLACEMENT  2/28/2019    KNEE ARTHROSCOPY Bilateral     KNEE ARTHROSCOPY Right 2009    cibishino last assessed 08/24/2016    KNEE ARTHROSCOPY Right 07/01/2019    LIVER SURGERY      NERVE BLOCK Bilateral 5/29/2018    Procedure: SPLANCHNIC NERVE BLOCK;  Surgeon: Espinoza Wilkinson MD;  [facility];  Service: Pain Management     PANCREAS SURGERY      stents    PANCREATIC CYST EXCISION      MA INJECT NERV [address] Bilateral 5/9/2017    Procedure: CELIAC PLEXUS BLOCK ;  Surgeon: Espinoza Wilkinson MD;  [facility];  Service: Pain Management     MA INJECT NERV BLCK,CELIAC PLEXUS Bilateral 6/1/2017    Procedure: SPLANCHNIC NERVE BLOCK at T12;  Surgeon: Espinoza Wilkinson MD;  [facility];  Service: Pain Management     MA INJECT NERV BLCK,CELIAC PLEXUS Bilateral 8/8/2017    Procedure: BILATERAL SPLANCHNIC NERVE BLOCK T12;  Surgeon: Espinoza Wilkinson MD;  [facility];  Service: Pain Management     MA INJECT NERV BLCK,CELIAC PLEXUS Bilateral 4/18/2019 Procedure: BLOCK / INJECTION CELIAC PLEXUS;  Surgeon: Ervin Myers MD;  [facility];  Service: Pain Management     SD INJECT NERV BLCK,CELIAC PLEXUS Bilateral 2019    Procedure: SPLANCHNIC NERVE BLOCK;  Surgeon: Ervin Myers MD;  [facility];  Service: Pain Management     SD INJECT NERV BLCK,CELIAC PLEXUS Bilateral 10/17/2019    Procedure: SPLANCHNIC NERVE BLOCK;  Surgeon: Ervin Myers MD;  [facility];  Service: Pain Management     SD INJECT NERV Hiro Haff Bilateral 2017    Procedure: SPLANCHNIC NERVE BLOCK;  Surgeon: Ervin Myers MD;  [facility];  Service: Pain Management     SD LAP,CHOLECYSTECTOMY N/A 2017    Procedure: LAPAROSCOPIC CHOLECYSTECTOMY, IOC, POSSIBLE OPEN ;  Surgeon: Gabbi Molina MD;  [facility];  Service: General    ROTATOR CUFF REPAIR Right     SHOULDER ARTHROSCOPY      SHOULDER ARTHROSCOPY Right        Family History   Problem Relation Age of Onset    Cirrhosis Mother     Heart disease Other         cardiac disorder    Cancer Other        Social History     Occupational History    Occupation: fulltime employment   Tobacco Use    Smoking status: Former Smoker     Packs/day: 1 00     Years: 20 00     Pack years: 20 00     Quit date: 3/15/2021     Years since quittin 0    Smokeless tobacco: Never Used   Substance and Sexual Activity    Alcohol use:  Yes     Alcohol/week: 10 0 standard drinks     Types: 10 Cans of beer per week     Frequency: 2-4 times a month     Drinks per session: 1 or 2     Comment: quit    Drug use: No    Sexual activity: Yes     Partners: Female         Current Outpatient Medications:     albuterol (PROVENTIL HFA,VENTOLIN HFA) 90 mcg/act inhaler, INHALE TWO PUFFS BY MOUTH EVERY 6 HOURS AS NEEDED FOR WHEEZING, Disp: 54 g, Rfl: 0    amLODIPine (NORVASC) 5 mg tablet, TAKE ONE TABLET BY MOUTH ONCE A DAY , Disp: 30 tablet, Rfl: 0    Choline Fenofibrate (Fenofibric Acid) 135 MG CPDR, Take 1 capsule (135 mg total) by mouth daily, Disp: 90 capsule, Rfl: 2    diclofenac sodium (VOLTAREN) 1 %, diclofenac 1 % topical gel  APPLY 2 GRAMS TO THE AFFECTED AREA(S) BY TOPICAL ROUTE 4 TIMES PER DAY, Disp: , Rfl:     dicyclomine (BENTYL) 20 mg tablet, Take 1 tablet (20 mg total) by mouth 2 (two) times a day, Disp: 20 tablet, Rfl: 0    EPIPEN 2-ARIEL 0 3 MG/0 3ML SOAJ, Inject one syringe (0 3mg) intramuscularly once as directed, Disp: 2 each, Rfl: 0    esomeprazole (NexIUM) 40 MG capsule, Take 1 capsule (40 mg total) by mouth 2 (two) times a day before meals, Disp: 60 capsule, Rfl: 5    famotidine (PEPCID) 40 MG tablet, Take 40 mg by mouth 2 (two) times a day, Disp: , Rfl:     insulin degludec [facility]) 100 units/mL injection pen, 20 units subcut in hs, Disp: , Rfl:     Lidocaine-Menthol 4-1 % GEL, NuLido 4 %-1 % topical gel  Apply to affected area 2-4 times a day as needed for pain, Disp: , Rfl:     lisinopril (ZESTRIL) 20 mg tablet, TAKE ONE TABLET BY MOUTH ONCE A DAY , Disp: 30 tablet, Rfl: 0    omega-3-acid ethyl esters (LOVAZA) 1 g capsule, TAKE TWO CAPSULES BY MOUTH TWICE DAILY , Disp: 360 capsule, Rfl: 0    omeprazole (PriLOSEC) 20 mg delayed release capsule, Take 20 mg by mouth daily, Disp: , Rfl:     ondansetron (ZOFRAN-ODT) 4 mg disintegrating tablet, DISSOLVE ONE TABLET IN MOUTH EVERY EIGHT HOURS AS NEEDED NAUSEA AND VOMITING , Disp: 30 tablet, Rfl: 0    oxyCODONE-acetaminophen (PERCOCET) 5-325 mg per tablet, Take 1 tablet by mouth every 6 (six) hours as needed for moderate painMax Daily Amount: 4 tablets, Disp: 20 tablet, Rfl: 0    pancrelipase, Lip-Prot-Amyl, (Creon) 12,000 units capsule, Take 12,000 units of lipase by mouth as needed for snacks, Disp: 90 capsule, Rfl: 2    Pancrelipase, Lip-Prot-Amyl, (Creon) 20218 units CPEP, Take 1 capsule (36,000 Units total) by mouth 3 (three) times a day before meals, Disp: 90 capsule, Rfl: 3    predniSONE 20 mg tablet, Take 1 tablet (20 mg total) by mouth daily, Disp: 5 tablet, Rfl: 0    pregabalin (LYRICA) 100 mg capsule, TAKE ONE CAPSULE BY MOUTH TWICE DAILY , Disp: 90 capsule, Rfl: 0    rosuvastatin (CRESTOR) 40 MG tablet, TAKE ONE TABLET BY MOUTH ONCE A DAY , Disp: 90 tablet, Rfl: 0    traZODone (DESYREL) 50 mg tablet, TAKE ONE TABLET BY MOUTH NIGHTLY AT BEDTIME , Disp: 30 tablet, Rfl: 0    Allergies   Allergen Reactions    Bee Venom Swelling       Physical Exam:    /53   Pulse 74   Resp 16   Ht 5' 8" (1 727 m)   Wt 91 8 kg (202 lb 6 4 oz)   BMI 30 77 kg/m²     Constitutional:normal, well developed, well nourished, alert, in no distress and non-toxic and no overt pain behavior  Eyes:anicteric  HEENT:grossly intact  Neck:supple, symmetric, trachea midline and no masses   Pulmonary:even and unlabored  Cardiovascular:No edema or pitting edema present  Skin:Normal without rashes or lesions and well hydrated  Psychiatric:Mood and affect appropriate  Neurologic:Cranial Nerves II-XII grossly intact  Musculoskeletal:normal     Cervical Spine Exam    Appearance:  Normal lordosis  Palpation/Tenderness:  left cervical paraspinal tenderness  right cervical paraspinal tenderness  left trapezium tenderness  right trapezium tenderness  Sensory:  no sensory deficits noted  Range of Motion:  Flexion:   Moderately limited  with pain  Extension:  Moderately limited  with pain  Lateral Flexion - Left:  Moderately limited  with pain  Lateral Flexion - Right:  Moderately limited  with pain  Rotation - Left:  Moderately limited  with pain  Rotation - Right:  Moderately limited  with pain  Motor Strength:  Left Arm Flexion  5/5  Left Arm Extension  5/5  Right Arm Flexion  5/5  Right Arm Extension  5/5  Left Wrist Flexion  4/5  Left Wrist Extension  4/5  Left Finger Abduction  5/5  Right Finger Abduction  5/5  Left Pincer Grasp  5/5  Right Pincer Grasp  5/5  Left    5/5  Right   5/5  Reflexes:  Left Patellar:  2+   Right Patellar: 2+   Left Achilles:  2+   Right Achilles:  2+   Special Tests:  Left Spurlings:  negative  Right Spurlings  negative      Imaging    MRI CERVICAL SPINE WITHOUT CONTRAST     INDICATION: Evaluate for possible herniated disc  Patient has trapezius muscle spasm and spasm of the rhomboid muscle      COMPARISON:  None      TECHNIQUE:  Sagittal T1, sagittal T2, sagittal inversion recovery, axial T2, axial  2D merge  Imaging performed on 1 5T MRI    IMAGE QUALITY:  Diagnostic     FINDINGS:     ALIGNMENT:  Straightening of the cervical spine without subluxation  Vertebral body stature is preserved      MARROW SIGNAL:  Endplate degenerative changes with mixed type I and type II Modic signal noted at C5-C6  Mixed type II and type III Modic degenerative changes noted at C6-C7      CERVICAL AND VISUALIZED THORACIC CORD:  Normal signal within the visualized cord      PREVERTEBRAL AND PARASPINAL SOFT TISSUES:  Normal      VISUALIZED POSTERIOR FOSSA:  The visualized posterior fossa demonstrates no abnormal signal      CERVICAL DISC SPACES:  Disc space narrowing at C5-C6 and C6-C7      C2-C3:  Moderate right facet arthropathy and hypertrophy with mild perifacet soft tissue edema  Correlate for active facet arthropathy  No spinal canal or foraminal stenosis      C3-C4:  Shallow central protrusion and left facet arthropathy with moderate to severe left foraminal stenosis  Mild spinal stenosis  Patent right neural foramina      C4-C5:  Left facet arthropathy with mild left foraminal stenosis      C5-C6:  Posterior disc osteophyte complex with right greater than left uncovertebral hypertrophy and suggestion of a right foraminal disc osteophyte complex resulting in moderate right foraminal stenosis  Patent spinal canal and left neural foramina      C6-C7:  Broad posterior protrusion with mild ventral indentation on the thecal sac and no greater than mild spinal stenosis    Patent neural foramina      C7-T1:  Normal      UPPER THORACIC DISC SPACES:  Normal      IMPRESSION:     Cervical spondylosis with mild spinal stenosis and variable degrees of facet arthropathy and foraminal narrowing as described above  Please see above level by level discussion      Facet arthropathy is most pronounced on the right at C2-C3 where there is mild perifacet soft tissue edema    Correlate for active facet arthropathy at this level      FL spine and pain procedure    (Results Pending)         Orders Placed This Encounter   Procedures    FL spine and pain procedure

## 2021-04-08 ENCOUNTER — IMMUNIZATIONS (OUTPATIENT)
Dept: FAMILY MEDICINE CLINIC | Facility: HOSPITAL | Age: 46
End: 2021-04-08

## 2021-04-08 DIAGNOSIS — Z23 ENCOUNTER FOR IMMUNIZATION: Primary | ICD-10-CM

## 2021-04-08 PROCEDURE — 91300 SARS-COV-2 / COVID-19 MRNA VACCINE (PFIZER-BIONTECH) 30 MCG: CPT

## 2021-04-08 PROCEDURE — 0001A SARS-COV-2 / COVID-19 MRNA VACCINE (PFIZER-BIONTECH) 30 MCG: CPT

## 2021-04-09 ENCOUNTER — TELEPHONE (OUTPATIENT)
Dept: RADIOLOGY | Facility: CLINIC | Age: 46
End: 2021-04-09

## 2021-04-09 NOTE — TELEPHONE ENCOUNTER
S/w pt and scheduled MARY for 5/13/21 @ 1 pm  Gave pre procedure instructions, masking//vaccine policy and COVID screening

## 2021-04-14 ENCOUNTER — TELEPHONE (OUTPATIENT)
Dept: GASTROENTEROLOGY | Facility: CLINIC | Age: 46
End: 2021-04-14

## 2021-04-14 NOTE — TELEPHONE ENCOUNTER
Spoke with patient  History of RUQ pain, gastric wall thickening, fatty liver, enlarged liver, familial hypertriglyceridemia, chronic pancreatitis  Patients blood testing for hereditary hemochromatosis is negative  He is aware  Patient has been following his weight loss regimen, and making better dietary choices  Reassured herbal remedies are not suggested, but up to the patient if he would like to try these       Patient would like to know fi he still needs to see a hematologist?

## 2021-04-14 NOTE — TELEPHONE ENCOUNTER
No he does not need to see the hematologist at this point  I ordered a A1C and triglyceride level for him to get this month sometime before he sees his endocrinologist so we will just follow this up and likely monitor his triglycerides every 3-6 months

## 2021-04-19 ENCOUNTER — TELEMEDICINE (OUTPATIENT)
Dept: FAMILY MEDICINE CLINIC | Facility: CLINIC | Age: 46
End: 2021-04-19
Payer: COMMERCIAL

## 2021-04-19 DIAGNOSIS — Z20.822 EXPOSURE TO COVID-19 VIRUS: ICD-10-CM

## 2021-04-19 DIAGNOSIS — B34.9 VIRAL INFECTION, UNSPECIFIED: ICD-10-CM

## 2021-04-19 PROCEDURE — 99213 OFFICE O/P EST LOW 20 MIN: CPT | Performed by: FAMILY MEDICINE

## 2021-04-19 NOTE — PROGRESS NOTES
COVID-19 Outpatient Progress Note    Assessment/Plan:    Problem List Items Addressed This Visit     None      Visit Diagnoses     Exposure to COVID-19 virus        Relevant Orders    Novel Coronavirus (Covid-19),PCR [facility] - Collected in Office    Viral infection, unspecified        Relevant Orders    Novel Coronavirus (Covid-19),PCR [facility] - Collected in Office         Disposition:     I recommended self-quarantine for 10 days and to watch for symptoms until 14 days after exposure  If patient were to develop symptoms, they should self isolate and call our office for further guidance  I recommended patient come to the office for further evaluation  I have spent 20 minutes directly with the patient  Greater than 50% of this time was spent in counseling/coordination of care regarding: prognosis, risks and benefits of treatment options, instructions for management, patient and family education, importance of treatment compliance, risk factor reductions and impressions  Encounter provider LUANA Antunez    Provider located at [facility] [id number] [address] [phone]    Recent Visits  No visits were found meeting these conditions  Showing recent visits within past 7 days and meeting all other requirements     Today's Visits  Date Type Provider Dept   04/19/21 Telemedicine LUANA Antunez [address] today's visits and meeting all other requirements     Future Appointments  No visits were found meeting these conditions  Showing future appointments within next 150 days and meeting all other requirements      This virtual check-in was done via [employer] Du[employer] and patient was informed that this is not a secure, HIPAA-compliant platform  He agrees to proceed      Patient agrees to participate in a virtual check in via telephone or video visit instead of presenting to the office to address urgent/immediate medical needs  Patient is aware this is a billable service  After connecting through [place], the patient was identified by name and date of birth  Dale Chacon was informed that this was a telemedicine visit and that the exam was being conducted confidentially over secure lines  My office door was closed  No one else was in the room  Dale Chacon acknowledged consent and understanding of privacy and security of the telemedicine visit  I informed the patient that I have reviewed his record in Epic and presented the opportunity for him to ask any questions regarding the visit today  The patient agreed to participate  Subjective:   Dale Chacon is a 48 y o  male who is concerned about COVID-19  Patient's symptoms include fever  Patient denies chills, fatigue, malaise, congestion, rhinorrhea, sore throat, anosmia, loss of taste, cough, shortness of breath, chest tightness, abdominal pain, nausea, vomiting, diarrhea, myalgias and headaches       Date of symptom onset: 4/17/2021  Date of exposure: 4/16/2021    Exposure:   Contact with a person who is under investigation (PUI) for or who is positive for COVID-19 within the last 14 days?: Yes    Hospitalized recently for fever and/or lower respiratory symptoms?: No      Currently a healthcare worker that is involved in direct patient care?: No      Works in a special setting where the risk of COVID-19 transmission may be high? (this may include long-term care, correctional and [facility] facilities; homeless shelters; assisted-living facilities and group homes ): No      Resident in a special setting where the risk of COVID-19 transmission may be high? (this may include long-term care, correctional and [facility] facilities; homeless shelters; assisted-living facilities and group homes ): No      [id number]  None presently  Neg treat    Lab Results   Component Value Date    SARSCOV2 Negative 05/14/2020    [address] [facility] 98 Not Detected 04/13/2020     Past Medical History:   Diagnosis Date    Asthma     Chronic pain disorder     Diabetes mellitus (HCC)     GERD (gastroesophageal reflux disease)     Hyperlipidemia     Liver abscess     Meniscus tear     left knee work injury last assessed 08/24/2016    Pancreatitis     Pneumonia      Past Surgical History:   Procedure Laterality Date    CELIAC PLEXUS BLOCK Bilateral 10/29/2018    Procedure: SPLANCHNIC NERVE BLOCK;  Surgeon: Emilie Escobedo MD;  [facility];  Service: Pain Management     CELIAC PLEXUS BLOCK Bilateral 1/10/2019    Procedure: SPLANCHNIC NERVE BLOCK;  Surgeon: Emilie Escobedo MD;  [facility];  Service: Pain Management     CHOLECYSTECTOMY      ESOPHAGOGASTRODUODENOSCOPY N/A 11/16/2017    Procedure: ESOPHAGOGASTRODUODENOSCOPY (EGD); Surgeon: Delmis Murillo MD;  [facility]; Service: Gastroenterology    ESOPHAGOGASTRODUODENOSCOPY N/A 4/19/2018    Procedure: ESOPHAGOGASTRODUODENOSCOPY (EGD); Surgeon: Iker Sierra MD;  [facility]; Service: Gastroenterology    ESOPHAGOGASTRODUODENOSCOPY N/A 5/10/2018    Procedure: ESOPHAGOGASTRODUODENOSCOPY (EGD); Surgeon: Finn Mir MD;  [facility];   Service: Gastroenterology    IR TEMPORARY DIALYSIS CATHETER PLACEMENT  2/28/2019    KNEE ARTHROSCOPY Bilateral     KNEE ARTHROSCOPY Right 2009    cibishino last assessed 08/24/2016    KNEE ARTHROSCOPY Right 07/01/2019    LIVER SURGERY      NERVE BLOCK Bilateral 5/29/2018    Procedure: SPLANCHNIC NERVE BLOCK;  Surgeon: Emilie Escobedo MD;  [facility];  Service: Pain Management     PANCREAS SURGERY      stents    PANCREATIC CYST EXCISION      OR INJECT NERV [address] Bilateral 5/9/2017    Procedure: CELIAC PLEXUS BLOCK ;  Surgeon: Emilie Escobedo MD;  [facility];  Service: Pain Management     OR INJECT NERV [address] Bilateral 6/1/2017    Procedure: SPLANCHNIC NERVE BLOCK at T12;  Surgeon: Kalyani Stiles Castro Abdullahi MD;  [facility];  Service: Pain Management     DE INJECT NERV BLCK,CELIAC PLEXUS Bilateral 8/8/2017    Procedure: BILATERAL SPLANCHNIC NERVE BLOCK T12;  Surgeon: Kaylin Jones MD;  [facility];  Service: Pain Management     DE INJECT NERV BLCK,CELIAC PLEXUS Bilateral 4/18/2019    Procedure: BLOCK / INJECTION CELIAC PLEXUS;  Surgeon: Kaylin Jones MD;  [facility];  Service: Pain Management     DE INJECT NERV BLCK,CELIAC PLEXUS Bilateral 8/20/2019    Procedure: SPLANCHNIC NERVE BLOCK;  Surgeon: Kaylin Jones MD;  [facility];  Service: Pain Management     DE INJECT NERV BLCK,CELIAC PLEXUS Bilateral 10/17/2019    Procedure: SPLANCHNIC NERVE BLOCK;  Surgeon: Kaylin Jones MD;  [facility];  Service: Pain Management     DE INJECT NERV [address] Bilateral 12/28/2017    Procedure: SPLANCHNIC NERVE BLOCK;  Surgeon: Kaylin Jones MD;  [facility];  Service: Pain Management     DE LAP,CHOLECYSTECTOMY N/A 2/14/2017    Procedure: LAPAROSCOPIC CHOLECYSTECTOMY, IOC, POSSIBLE OPEN ;  Surgeon: Afshan Perla MD;  [facility];  Service: General    ROTATOR CUFF REPAIR Right     SHOULDER ARTHROSCOPY      SHOULDER ARTHROSCOPY Right      Current Outpatient Medications   Medication Sig Dispense Refill    albuterol (PROVENTIL HFA,VENTOLIN HFA) 90 mcg/act inhaler INHALE TWO PUFFS BY MOUTH EVERY 6 HOURS AS NEEDED FOR WHEEZING 54 g 0    amLODIPine (NORVASC) 5 mg tablet TAKE ONE TABLET BY MOUTH ONCE A DAY  30 tablet 0    Choline Fenofibrate (Fenofibric Acid) 135 MG CPDR Take 1 capsule (135 mg total) by mouth daily 90 capsule 2    diclofenac sodium (VOLTAREN) 1 % diclofenac 1 % topical gel   APPLY 2 GRAMS TO THE AFFECTED AREA(S) BY TOPICAL ROUTE 4 TIMES PER DAY      dicyclomine (BENTYL) 20 mg tablet Take 1 tablet (20 mg total) by mouth 2 (two) times a day 20 tablet 0    EPIPEN 2-ARIEL 0 3 MG/0 3ML SOAJ Inject one syringe (0 3mg) intramuscularly once as directed 2 each 0    esomeprazole (NexIUM) 40 MG capsule Take 1 capsule (40 mg total) by mouth 2 (two) times a day before meals 60 capsule 5    famotidine (PEPCID) 40 MG tablet Take 40 mg by mouth 2 (two) times a day      insulin degludec Dillon Gitelman FlexTouch) 100 units/mL injection pen 20 units subcut in hs      Lidocaine-Menthol 4-1 % GEL NuLido 4 %-1 % topical gel   Apply to affected area 2-4 times a day as needed for pain      lisinopril (ZESTRIL) 20 mg tablet TAKE ONE TABLET BY MOUTH ONCE A DAY  30 tablet 0    omega-3-acid ethyl esters (LOVAZA) 1 g capsule TAKE TWO CAPSULES BY MOUTH TWICE DAILY  360 capsule 0    omeprazole (PriLOSEC) 20 mg delayed release capsule Take 20 mg by mouth daily      ondansetron (ZOFRAN-ODT) 4 mg disintegrating tablet DISSOLVE ONE TABLET IN MOUTH EVERY EIGHT HOURS AS NEEDED NAUSEA AND VOMITING  30 tablet 0    oxyCODONE-acetaminophen (PERCOCET) 5-325 mg per tablet Take 1 tablet by mouth every 6 (six) hours as needed for moderate painMax Daily Amount: 4 tablets 20 tablet 0    pancrelipase, Lip-Prot-Amyl, (Creon) 12,000 units capsule Take 12,000 units of lipase by mouth as needed for snacks 90 capsule 2    Pancrelipase, Lip-Prot-Amyl, (Creon) 00550 units CPEP Take 1 capsule (36,000 Units total) by mouth 3 (three) times a day before meals 90 capsule 3    predniSONE 20 mg tablet Take 1 tablet (20 mg total) by mouth daily 5 tablet 0    pregabalin (LYRICA) 100 mg capsule TAKE ONE CAPSULE BY MOUTH TWICE DAILY  90 capsule 0    rosuvastatin (CRESTOR) 40 MG tablet TAKE ONE TABLET BY MOUTH ONCE A DAY  90 tablet 0    traZODone (DESYREL) 50 mg tablet TAKE ONE TABLET BY MOUTH NIGHTLY AT BEDTIME  30 tablet 0     No current facility-administered medications for this visit  Allergies   Allergen Reactions    Bee Venom Swelling       Review of Systems   Constitutional: Positive for fever  Negative for chills and fatigue     HENT: Negative for congestion, rhinorrhea and sore throat  Respiratory: Negative for cough, chest tightness and shortness of breath  Gastrointestinal: Negative for abdominal pain, diarrhea, nausea and vomiting  Musculoskeletal: Negative for myalgias  Neurological: Negative for headaches  Objective: There were no vitals filed for this visit  Physical Exam  Constitutional:       General: He is not in acute distress  Appearance: He is not ill-appearing or toxic-appearing  HENT:      Head: Normocephalic and atraumatic  Eyes:      Conjunctiva/sclera: Conjunctivae normal    Pulmonary:      Effort: Pulmonary effort is normal  No respiratory distress  Neurological:      Mental Status: He is oriented to person, place, and time  VIRTUAL VISIT DISCLAIMER    Gloriadharmesh Grisel acknowledges that he has consented to an online visit or consultation  He understands that the online visit is based solely on information provided by him, and that, in the absence of a face-to-face physical evaluation by the physician, the diagnosis he receives is both limited and provisional in terms of accuracy and completeness  This is not intended to replace a full medical face-to-face evaluation by the physician  Pedrito Recinos understands and accepts these terms

## 2021-04-20 PROCEDURE — U0005 INFEC AGEN DETEC AMPLI PROBE: HCPCS | Performed by: FAMILY MEDICINE

## 2021-04-20 PROCEDURE — U0003 INFECTIOUS AGENT DETECTION BY NUCLEIC ACID (DNA OR RNA); SEVERE ACUTE RESPIRATORY SYNDROME CORONAVIRUS 2 (SARS-COV-2) (CORONAVIRUS DISEASE [COVID-19]), AMPLIFIED PROBE TECHNIQUE, MAKING USE OF HIGH THROUGHPUT TECHNOLOGIES AS DESCRIBED BY CMS-2020-01-R: HCPCS | Performed by: FAMILY MEDICINE

## 2021-04-21 LAB — SARS-COV-2 RNA RESP QL NAA+PROBE: NEGATIVE

## 2021-04-26 DIAGNOSIS — G89.4 CHRONIC PAIN SYNDROME: ICD-10-CM

## 2021-04-26 DIAGNOSIS — I10 ESSENTIAL HYPERTENSION: ICD-10-CM

## 2021-04-29 ENCOUNTER — IMMUNIZATIONS (OUTPATIENT)
Dept: FAMILY MEDICINE CLINIC | Facility: HOSPITAL | Age: 46
End: 2021-04-29

## 2021-04-29 DIAGNOSIS — Z23 ENCOUNTER FOR IMMUNIZATION: Primary | ICD-10-CM

## 2021-04-29 PROCEDURE — 0002A SARS-COV-2 / COVID-19 MRNA VACCINE (PFIZER-BIONTECH) 30 MCG: CPT

## 2021-04-29 PROCEDURE — 91300 SARS-COV-2 / COVID-19 MRNA VACCINE (PFIZER-BIONTECH) 30 MCG: CPT

## 2021-04-29 RX ORDER — LISINOPRIL 20 MG/1
TABLET ORAL
Qty: 30 TABLET | Refills: 0 | Status: SHIPPED | OUTPATIENT
Start: 2021-04-29 | End: 2021-06-01

## 2021-04-29 RX ORDER — PREGABALIN 100 MG/1
CAPSULE ORAL
Qty: 90 CAPSULE | Refills: 0 | Status: SHIPPED | OUTPATIENT
Start: 2021-04-29 | End: 2021-06-09

## 2021-04-29 RX ORDER — AMLODIPINE BESYLATE 5 MG/1
TABLET ORAL
Qty: 30 TABLET | Refills: 0 | Status: SHIPPED | OUTPATIENT
Start: 2021-04-29 | End: 2021-06-01

## 2021-05-02 DIAGNOSIS — G47.00 INSOMNIA, UNSPECIFIED TYPE: ICD-10-CM

## 2021-05-03 RX ORDER — TRAZODONE HYDROCHLORIDE 50 MG/1
TABLET ORAL
Qty: 30 TABLET | Refills: 0 | Status: SHIPPED | OUTPATIENT
Start: 2021-05-03 | End: 2021-07-14

## 2021-05-12 DIAGNOSIS — G47.00 INSOMNIA, UNSPECIFIED TYPE: ICD-10-CM

## 2021-05-12 DIAGNOSIS — E78.2 MIXED HYPERLIPIDEMIA: ICD-10-CM

## 2021-05-13 ENCOUNTER — HOSPITAL ENCOUNTER (OUTPATIENT)
Dept: RADIOLOGY | Facility: CLINIC | Age: 46
Discharge: HOME | End: 2021-05-13
Attending: STUDENT IN AN ORGANIZED HEALTH CARE EDUCATION/TRAINING PROGRAM | Admitting: STUDENT IN AN ORGANIZED HEALTH CARE EDUCATION/TRAINING PROGRAM
Payer: COMMERCIAL

## 2021-05-13 VITALS
OXYGEN SATURATION: 97 % | DIASTOLIC BLOOD PRESSURE: 73 MMHG | HEART RATE: 87 BPM | SYSTOLIC BLOOD PRESSURE: 116 MMHG | TEMPERATURE: 97.8 F | RESPIRATION RATE: 20 BRPM

## 2021-05-13 DIAGNOSIS — M54.12 RADICULOPATHY, CERVICAL REGION: ICD-10-CM

## 2021-05-13 PROCEDURE — 62321 NJX INTERLAMINAR CRV/THRC: CPT | Performed by: STUDENT IN AN ORGANIZED HEALTH CARE EDUCATION/TRAINING PROGRAM

## 2021-05-13 RX ORDER — ONDANSETRON 4 MG/1
TABLET, ORALLY DISINTEGRATING ORAL
Qty: 30 TABLET | Refills: 0 | OUTPATIENT
Start: 2021-05-13

## 2021-05-13 RX ORDER — OMEGA-3-ACID ETHYL ESTERS 1 G/1
CAPSULE, LIQUID FILLED ORAL
Qty: 360 CAPSULE | Refills: 0 | OUTPATIENT
Start: 2021-05-13

## 2021-05-13 RX ORDER — METHYLPREDNISOLONE ACETATE 80 MG/ML
80 INJECTION, SUSPENSION INTRA-ARTICULAR; INTRALESIONAL; INTRAMUSCULAR; PARENTERAL; SOFT TISSUE ONCE
Status: COMPLETED | OUTPATIENT
Start: 2021-05-13 | End: 2021-05-13

## 2021-05-13 RX ORDER — ONDANSETRON 4 MG/1
4 TABLET, ORALLY DISINTEGRATING ORAL EVERY 8 HOURS PRN
Qty: 30 TABLET | Refills: 0 | Status: SHIPPED | OUTPATIENT
Start: 2021-05-13 | End: 2021-07-14

## 2021-05-13 RX ORDER — OMEGA-3-ACID ETHYL ESTERS 1 G/1
2 CAPSULE, LIQUID FILLED ORAL 2 TIMES DAILY
Qty: 360 CAPSULE | Refills: 0 | Status: SHIPPED | OUTPATIENT
Start: 2021-05-13 | End: 2021-09-07

## 2021-05-13 RX ADMIN — IOHEXOL 1 ML: 300 INJECTION, SOLUTION INTRAVENOUS at 13:30

## 2021-05-13 RX ADMIN — METHYLPREDNISOLONE ACETATE 80 MG: 80 INJECTION, SUSPENSION INTRA-ARTICULAR; INTRALESIONAL; INTRAMUSCULAR; PARENTERAL; SOFT TISSUE at 13:31

## 2021-05-13 NOTE — DISCHARGE INSTR - LAB
Epidural Steroid Injection   WHAT YOU NEED TO KNOW:   An epidural steroid injection (ARNEL) is a procedure to inject steroid medicine into the epidural space  The epidural space is between your spinal cord and vertebrae  Steroids reduce inflammation and fluid buildup in your spine that may be causing pain  You may be given pain medicine along with the steroids  ACTIVITY  · Do not drive or operate machinery today  · No strenuous activity today - bending, lifting, etc   · You may resume normal activites starting tomorrow - start slowly and as tolerated  · You may shower today, but no tub baths or hot tubs  · You may have numbness for several hours from the local anesthetic  Please use caution and common sense, especially with weight-bearing activities  CARE OF THE INJECTION SITE  · If you have soreness or pain, apply ice to the area today (20 minutes on/20 minutes off)  · Starting tomorrow, you may use warm, moist heat or ice if needed  · You may have an increase or change in your discomfort for 36-48 hours after your treatment  · Apply ice and continue with any pain medication you have been prescribed  · [facility]ify [facility] if you have any of the following: redness, drainage, swelling, headache, stiff neck or fever above 100°F     SPECIAL INSTRUCTIONS  · Our office will contact you in approximately 7 days for a progress report  MEDICATIONS  · Continue to take all routine medications  · Our office may have instructed you to hold some medications  As no general anesthesia was used in today's procedure, you should not experience any side effects related to anesthesia  If you have a problem specifically related to your procedure, please call our office at [phone]  Problems not related to your procedure should be directed to your primary care physician

## 2021-05-13 NOTE — H&P
History of Present Illness: The patient is a 48 y o  male who presents with complaints of neck pain with cerbvical radciulopathy    Patient Active Problem List   Diagnosis    Pancreatic lesion    Renal mass    Encounter for smoking cessation counseling    Leukocytosis    RBBB    Epigastric pain    Acute gastritis without hemorrhage    Generalized abdominal pain    Abnormal transaminases    Acute on chronic pancreatitis (HCC)    Leukopenia    Hypertriglyceridemia    Chronic pain syndrome    Esophagitis    Other chronic pancreatitis (HCC)    Chronic gastritis without bleeding    Uncomplicated opioid dependence (HCC)    Long-term current use of opiate analgesic    GERD (gastroesophageal reflux disease)    Hyponatremia    Healthcare maintenance    Elevated blood pressure reading    Upper abdominal pain    Hypertension    Pancreatitis, unspecified pancreatitis type    Abdominal pain, epigastric    Change in bowel habits    Musculoskeletal neck pain       Past Medical History:   Diagnosis Date    Asthma     Chronic pain disorder     Diabetes mellitus ([facility] [address] )     GERD (gastroesophageal reflux disease)     Hyperlipidemia     Liver abscess     Meniscus tear     left knee work injury last assessed 08/24/2016    Pancreatitis     Pneumonia        Past Surgical History:   Procedure Laterality Date    CELIAC PLEXUS BLOCK Bilateral 10/29/2018    Procedure: SPLANCHNIC NERVE BLOCK;  Surgeon: Nisreen Crowder MD;  [facility];  Service: Pain Management     CELIAC PLEXUS BLOCK Bilateral 1/10/2019    Procedure: SPLANCHNIC NERVE BLOCK;  Surgeon: Nisreen Crowder MD;  [facility];  Service: Pain Management     CHOLECYSTECTOMY      ESOPHAGOGASTRODUODENOSCOPY N/A 11/16/2017    Procedure: ESOPHAGOGASTRODUODENOSCOPY (EGD); Surgeon: Jose Armando Jack MD;  [facility];   Service: Gastroenterology    ESOPHAGOGASTRODUODENOSCOPY N/A 4/19/2018    Procedure: ESOPHAGOGASTRODUODENOSCOPY (EGD); Surgeon: Mag Mosqueda MD;  [facility]; Service: Gastroenterology    ESOPHAGOGASTRODUODENOSCOPY N/A 5/10/2018    Procedure: ESOPHAGOGASTRODUODENOSCOPY (EGD); Surgeon: Lynda Lind MD;  [facility];   Service: Gastroenterology    IR TEMPORARY DIALYSIS CATHETER PLACEMENT  2/28/2019    KNEE ARTHROSCOPY Bilateral     KNEE ARTHROSCOPY Right 2009    cibishino last assessed 08/24/2016    KNEE ARTHROSCOPY Right 07/01/2019    LIVER SURGERY      NERVE BLOCK Bilateral 5/29/2018    Procedure: SPLANCHNIC NERVE BLOCK;  Surgeon: Claudia Carr MD;  [facility];  Service: Pain Management     PANCREAS SURGERY      stents    PANCREATIC CYST EXCISION      NC INJECT NERV BLCK,CELIAC PLEXUS Bilateral 5/9/2017    Procedure: CELIAC PLEXUS BLOCK ;  Surgeon: Claudia Carr MD;  [facility];  Service: Pain Management     NC INJECT NERV BLCK,CELIAC PLEXUS Bilateral 6/1/2017    Procedure: SPLANCHNIC NERVE BLOCK at T12;  Surgeon: Claudia Carr MD;  [facility];  Service: Pain Management     NC INJECT NERV BLCK,CELIAC PLEXUS Bilateral 8/8/2017    Procedure: BILATERAL SPLANCHNIC NERVE BLOCK T12;  Surgeon: Claudia Carr MD;  [facility];  Service: Pain Management     NC INJECT NERV BLCK,CELIAC PLEXUS Bilateral 4/18/2019    Procedure: BLOCK / INJECTION CELIAC PLEXUS;  Surgeon: Claudia Carr MD;  [facility];  Service: Pain Management     NC INJECT NERV BLCK,CELIAC PLEXUS Bilateral 8/20/2019    Procedure: SPLANCHNIC NERVE BLOCK;  Surgeon: Claudia Carr MD;  [facility];  Service: Pain Management     NC INJECT NERV BLCK,CELIAC PLEXUS Bilateral 10/17/2019    Procedure: SPLANCHNIC NERVE BLOCK;  Surgeon: Claudia Carr MD;  [facility];  Service: Pain Management     NC INJECT NERV BLCK,PARAVERT SYMPATH Bilateral 12/28/2017    Procedure: SPLANCHNIC NERVE BLOCK;  Surgeon: Claudia Carr MD;  [facility];  Service: Pain Management     NC LAP,CHOLECYSTECTOMY N/A 2/14/2017    Procedure: LAPAROSCOPIC CHOLECYSTECTOMY, IOC, POSSIBLE OPEN ;  Surgeon: Nima Centeno MD;  [facility];  Service: General    ROTATOR CUFF REPAIR Right     SHOULDER ARTHROSCOPY      SHOULDER ARTHROSCOPY Right          Current Outpatient Medications:     albuterol (PROVENTIL HFA,VENTOLIN HFA) 90 mcg/act inhaler, INHALE TWO PUFFS BY MOUTH EVERY 6 HOURS AS NEEDED FOR WHEEZING, Disp: 54 g, Rfl: 0    amLODIPine (NORVASC) 5 mg tablet, TAKE ONE TABLET BY MOUTH ONCE A DAY , Disp: 30 tablet, Rfl: 0    Choline Fenofibrate (Fenofibric Acid) 135 MG CPDR, Take 1 capsule (135 mg total) by mouth daily, Disp: 90 capsule, Rfl: 2    diclofenac sodium (VOLTAREN) 1 %, diclofenac 1 % topical gel  APPLY 2 GRAMS TO THE AFFECTED AREA(S) BY TOPICAL ROUTE 4 TIMES PER DAY, Disp: , Rfl:     dicyclomine (BENTYL) 20 mg tablet, Take 1 tablet (20 mg total) by mouth 2 (two) times a day, Disp: 20 tablet, Rfl: 0    EPIPEN 2-ARIEL 0 3 MG/0 3ML SOAJ, Inject one syringe (0 3mg) intramuscularly once as directed, Disp: 2 each, Rfl: 0    esomeprazole (NexIUM) 40 MG capsule, Take 1 capsule (40 mg total) by mouth 2 (two) times a day before meals, Disp: 60 capsule, Rfl: 5    famotidine (PEPCID) 40 MG tablet, Take 40 mg by mouth 2 (two) times a day, Disp: , Rfl:     insulin degludec Ardell Erick FlexTouch) 100 units/mL injection pen, 20 units subcut in hs, Disp: , Rfl:     Lidocaine-Menthol 4-1 % GEL, NuLido 4 %-1 % topical gel  Apply to affected area 2-4 times a day as needed for pain, Disp: , Rfl:     lisinopril (ZESTRIL) 20 mg tablet, TAKE ONE TABLET BY MOUTH ONCE A DAY , Disp: 30 tablet, Rfl: 0    omega-3-acid ethyl esters (LOVAZA) 1 g capsule, Take 2 capsules (2 g total) by mouth 2 (two) times a day, Disp: 360 capsule, Rfl: 0    omeprazole (PriLOSEC) 20 mg delayed release capsule, Take 20 mg by mouth daily, Disp: , Rfl:     ondansetron (ZOFRAN-ODT) 4 mg disintegrating tablet, Take 1 tablet (4 mg total) by mouth every 8 (eight) hours as needed for nausea or vomiting, Disp: 30 tablet, Rfl: 0    oxyCODONE-acetaminophen (PERCOCET) 5-325 mg per tablet, Take 1 tablet by mouth every 6 (six) hours as needed for moderate painMax Daily Amount: 4 tablets, Disp: 20 tablet, Rfl: 0    pancrelipase, Lip-Prot-Amyl, (Creon) 12,000 units capsule, Take 12,000 units of lipase by mouth as needed for snacks, Disp: 90 capsule, Rfl: 2    Pancrelipase, Lip-Prot-Amyl, (Creon) 31953 units CPEP, Take 1 capsule (36,000 Units total) by mouth 3 (three) times a day before meals, Disp: 90 capsule, Rfl: 3    predniSONE 20 mg tablet, Take 1 tablet (20 mg total) by mouth daily, Disp: 5 tablet, Rfl: 0    pregabalin (LYRICA) 100 mg capsule, TAKE ONE CAPSULE BY MOUTH TWICE DAILY , Disp: 90 capsule, Rfl: 0    rosuvastatin (CRESTOR) 40 MG tablet, TAKE ONE TABLET BY MOUTH ONCE A DAY , Disp: 90 tablet, Rfl: 0    traZODone (DESYREL) 50 mg tablet, TAKE ONE TABLET BY MOUTH AT BEDTIME , Disp: 30 tablet, Rfl: 0    Allergies   Allergen Reactions    Bee Venom Swelling       Physical Exam: There were no vitals filed for this visit  General: Awake, Alert, Oriented x 3, Mood and affect appropriate  Respiratory: Respirations even and unlabored  Cardiovascular: Peripheral pulses intact; no edema  Musculoskeletal Exam: neck pain cervical radiculopathy    ASA Score: 3    Patient/Chart Verification  Patient ID Verified: Verbal  ID Band Applied: No  Consents Confirmed: Procedural, To be obtained in the Pre-Procedure area  H&P( within 30 days) Verified: To be obtained in the Pre-Procedure area  Interval H&P(within 24 hr) Complete (required for Outpatients and Surgery Admit only): To be obtained in the Pre-Procedure area  Allergies Reviewed: Yes  Anticoag/NSAID held?: NA  Currently on antibiotics?: No    Assessment:   1   Radiculopathy, cervical region        Plan: C6-7 MARY

## 2021-05-20 ENCOUNTER — TELEPHONE (OUTPATIENT)
Dept: PAIN MEDICINE | Facility: CLINIC | Age: 46
End: 2021-05-20

## 2021-05-24 NOTE — TELEPHONE ENCOUNTER
Pt reports no improvement post inj   Pain level 7-8/10   Pt aware I will call next week for an update

## 2021-05-24 NOTE — TELEPHONE ENCOUNTER
If No improvement over the next week, next step would be MBB as he does have degenerative disease of the neck that is likely cuasing his symptoms

## 2021-05-28 DIAGNOSIS — I10 ESSENTIAL HYPERTENSION: ICD-10-CM

## 2021-06-01 RX ORDER — AMLODIPINE BESYLATE 5 MG/1
TABLET ORAL
Qty: 30 TABLET | Refills: 0 | Status: SHIPPED | OUTPATIENT
Start: 2021-06-01 | End: 2021-06-30

## 2021-06-01 RX ORDER — LISINOPRIL 20 MG/1
TABLET ORAL
Qty: 30 TABLET | Refills: 0 | Status: SHIPPED | OUTPATIENT
Start: 2021-06-01 | End: 2021-06-30

## 2021-06-08 ENCOUNTER — OFFICE VISIT (OUTPATIENT)
Dept: DERMATOLOGY | Facility: CLINIC | Age: 46
End: 2021-06-08
Payer: COMMERCIAL

## 2021-06-08 VITALS — HEIGHT: 71 IN | BODY MASS INDEX: 28.28 KG/M2 | TEMPERATURE: 97.7 F | WEIGHT: 202 LBS

## 2021-06-08 DIAGNOSIS — D22.9 MULTIPLE MELANOCYTIC NEVI: Primary | ICD-10-CM

## 2021-06-08 DIAGNOSIS — D17.9 LIPOMA, UNSPECIFIED SITE: ICD-10-CM

## 2021-06-08 DIAGNOSIS — D18.01 CHERRY ANGIOMA: ICD-10-CM

## 2021-06-08 DIAGNOSIS — G89.4 CHRONIC PAIN SYNDROME: ICD-10-CM

## 2021-06-08 DIAGNOSIS — L81.4 LENTIGO: ICD-10-CM

## 2021-06-08 PROCEDURE — 99204 OFFICE O/P NEW MOD 45 MIN: CPT | Performed by: DERMATOLOGY

## 2021-06-08 NOTE — PATIENT INSTRUCTIONS
MELANOCYTIC NEVI ("Moles")    Assessment and Plan:  Based on a thorough discussion of this condition and the management approach to it (including a comprehensive discussion of the known risks, side effects and potential benefits of treatment), the patient (family) agrees to implement the following specific plan:   When outside we recommend using a wide brim hat, sunglasses, long sleeve and pants, sunscreen with SPF 16+ with reapplication every 2 hours, or SPF specific clothing    Benign, reassured   Annual skin check     Melanocytic Nevi  Melanocytic nevi ("moles") are tan or brown, raised or flat areas of the skin which have an increased number of melanocytes  Melanocytes are the cells in our body which make pigment and account for skin color  Some moles are present at birth (I e , "congenital nevi"), while others come up later in life (i e , "acquired nevi")  The sun can stimulate the body to make more moles  Sunburns are not the only thing that triggers more moles  Chronic sun exposure can do it too  Clinically distinguishing a healthy mole from melanoma may be difficult, even for experienced dermatologists  The "ABCDE's" of moles have been suggested as a means of helping to alert a person to a suspicious mole and the possible increased risk of melanoma  The suggestions for raising alert are as follows:    Asymmetry: Healthy moles tend to be symmetric, while melanomas are often asymmetric  Asymmetry means if you draw a line through the mole, the two halves do not match in color, size, shape, or surface texture  Asymmetry can be a result of rapid enlargement of a mole, the development of a raised area on a previously flat lesion, scaling, ulceration, bleeding or scabbing within the mole  Any mole that starts to demonstrate "asymmetry" should be examined promptly by a board certified dermatologist      Border: Healthy moles tend to have discrete, even borders    The border of a melanoma often blends into the normal skin and does not sharply delineate the mole from normal skin  Any mole that starts to demonstrate "uneven borders" should be examined promptly by a board certified dermatologist      Color: Healthy moles tend to be one color throughout  Melanomas tend to be made up of different colors ranging from dark black, blue, white, or red  Any mole that demonstrates a color change should be examined promptly by a board certified dermatologist      Diameter: Healthy moles tend to be smaller than 0 6 cm in size; an exception are "congenital nevi" that can be larger  Melanomas tend to grow and can often be greater than 0 6 cm (1/4 of an inch, or the size of a pencil eraser)  This is only a guideline, and many normal moles may be larger than 0 6 cm without being unhealthy  Any mole that starts to change in size (small to bigger or bigger to smaller) should be examined promptly by a board certified dermatologist      Evolving: Healthy moles tend to "stay the same "  Melanomas may often show signs of change or evolution such as a change in size, shape, color, or elevation  Any mole that starts to itch, bleed, crust, burn, hurt, or ulcerate or demonstrate a change or evolution should be examined promptly by a board certified dermatologist         Angel Lawson and Plan:  Based on a thorough discussion of this condition and the management approach to it (including a comprehensive discussion of the known risks, side effects and potential benefits of treatment), the patient (family) agrees to implement the following specific plan:   When outside we recommend using a wide brim hat, sunglasses, long sleeve and pants, sunscreen with SPF 92+ with reapplication every 2 hours, or SPF specific clothing       What is a lentigo? A lentigo is a pigmented flat or slightly raised lesion with a clearly defined edge  Unlike an ephelis (freckle), it does not fade in the winter months   There are several kinds of lentigo  The name lentigo originally referred to its appearance resembling a small lentil  The plural of lentigo is lentigines, although lentigos is also in common use  Who gets lentigines? Lentigines can affect males and females of all ages and races  Solar lentigines are especially prevalent in fair skinned adults  Lentigines associated with syndromes are present at birth or arise during childhood  What causes lentigines? Common forms of lentigo are due to exposure to ultraviolet radiation:   Sun damage including sunburn    Indoor tanning    Phototherapy, especially photochemotherapy (PUVA)    Ionizing radiation, eg radiation therapy, can also cause lentigines  Several familial syndromes associated with widespread lentigines originate from mutations in Kareem-MAP kinase, mTOR signaling and PTEN pathways  What is the treatment for lentigines? Most lentigines are left alone  Attempts to lighten them may not be successful  The following approaches are used:   SPF 50+ broad-spectrum sunscreen    Hydroquinone bleaching cream    Alpha hydroxy acids    Vitamin C    Retinoids    Azelaic acid    Chemical peels  Individual lesions can be permanently removed using:   Cryotherapy    Intense pulsed light    Pigment lasers    How can lentigines be prevented? Lentigines associated with exposure ultraviolet radiation can be prevented by very careful sun protection  Clothing is more successful at preventing new lentigines than are sunscreens  What is the outlook for lentigines? Lentigines usually persist  They may increase in number with age and sun exposure  Some in sun-protected sites may fade and disappear      GRANDE ANGIOMAS    Assessment and Plan:  Based on a thorough discussion of this condition and the management approach to it (including a comprehensive discussion of the known risks, side effects and potential benefits of treatment), the patient (family) agrees to implement the following 125 125 125 125 specific plan:   Monitor for changes   Benign, reassured       Assessment and Plan:    Cherry angioma, also known as [employer] spots, are benign vascular skin lesions  A "cherry angioma" is a firm red, blue or purple papule, 0 1-1 cm in diameter  When thrombosed, they can appear black in colour until evaluated with a dermatoscope when the red or purple colour is more easily seen  Cherry angioma may develop on any part of the body but most often appear on the scalp, face, lips and trunk  An angioma is due to proliferating endothelial cells; these are the cells that line the inside of a blood vessel  Angiomas can arise in early life or later in life; the most common type of angioma is a cherry angioma  Cherry angiomas are very common in males and females of any age or race  They are more noticeable in white skin than in skin of colour  They markedly increase in number from about the age of 36  There may be a family history of similar lesions  Eruptive cherry angiomas have been rarely reported to be associated with internal malignancy  The cause of angiomas is unknown  Genetic analysis of cherry angiomas has shown that they frequently carry specific somatic missense mutations in the GNAQ and GNA11 (Q209H) genes, which are involved in other vascular and melanocytic proliferations  LIPOMA    Assessment and Plan:  Based on a thorough discussion of this condition and the management approach to it (including a comprehensive discussion of the known risks, side effects and potential benefits of treatment), the patient (family) agrees to implement the following specific plan:   Benign, assurance provided  Did discuss with patient that if he was interested in removal we could excise the Lipoma  Patient is interested in having these Lipoma's removed  Patient has been scheduled  Lipoma  A lipoma is a non-cancerous tumor that is made up of fat cells   It slowly grows under the skin in the subcutaneous tissue  A person may have a single lipoma or may have many lipomas  They are very common  Lipomas can occur in people of all ages, however, they tend to develop in adulthood and are most noticeable during middle age  They affect both sexes equally, although solitary lipomas are more common in women whilst multiple lipomas occur more frequently in men  The cause of lipomas is unknown  It is possible there may be genetic involvement as many patients with lipomas come from a family with a history of these tumors  Sometimes an injury such as a blunt blow to part of the body may trigger growth of a lipoma  People are often unaware of lipomas until they have grown large enough to become visible and palpable  This growth occurs slowly over several years  Some features of lipomas include:   A dome-shaped or egg-shaped lump about 2-10 cm in diameter (some may grow even larger)    It feels soft and smooth and is easily moved under the skin with the fingers    Some have a rubbery or doughy consistency    They are most common on the shoulders, neck, trunk and arms, but they can occur anywhere on the body where fat tissue is present  Most lipomas are symptomless, but some are painful on applying pressure  Lipomas that are tender or painful are usually angiolipomas  This means the lipoma has an increased number of small blood vessels  Painful lipomas are also a feature of adiposis dolorosa or Dercum disease  Diagnosis of lipoma is usually made clinically by finding a soft lump under the skin  However, if there is any doubt, a deep skin biopsy can be performed which will show typical histopathological features of lipoma and its variants  Most lipomas require no treatment  Most lipomas eventually stop growing and remain indefinitely without causing any problems   Occasionally, lipomas that interfere with the movement of adjacent muscles may require surgical removal  Several methods are available:   Simple surgical excision    Squeeze technique (a small incision is made over the lipoma and the fatty tissue is squeezed through the hole)    Liposuction 50 50

## 2021-06-08 NOTE — PROGRESS NOTES
[facility] Note     Patient Name: Sherri Edwards  Encounter Date: 6/8/2021     Have you been cared for by a [facility] Dermatologist in the last 3 years and, if so, which one? No    · Have you traveled outside of the [address] [place] [address] in the past 3 months or outside of the [facility] area in the last 2 weeks? No     May we call your Preferred Phone number to discuss your specific medical information? Yes     May we leave a detailed message that includes your specific medical information? Yes      Today's Chief Concerns:   Concern #1:  Spot of concern   Concern #2:      Past Medical History:  Have you personally ever had or currently have any of the following? · Skin cancer (such as Melanoma, Basal Cell Carcinoma, Squamous Cell Carcinoma? (If Yes, please provide more detail)- No  · Eczema: No  · Psoriasis: No  · HIV/AIDS: No  · Hepatitis B or C: No  · Tuberculosis: No  · Systemic Immunosuppression such as Diabetes, Biologic or Immunotherapy, Chemotherapy, Organ Transplantation, Bone Marrow Transplantation (If YES, please provide more detail): No  · Radiation Treatment (If YES, please provide more detail): No  · Any other major medical conditions/concerns? (If Yes, which types)- No    Social History:     What is/was your primary occupation?      What are your hobbies/past-times? Family History:  Have any of your "first degree relatives" (parent, brother, sister, or child) had any of the following       · Skin cancer such as Melanoma or Merkel Cell Carcinoma or Pancreatic Cancer? YES, Sister: Pancreatic Cancer  · Eczema, Asthma, Hay Fever or Seasonal Allergies: YES, Allergies  · Psoriasis or Psoriatic Arthritis: No  · Do any other medical conditions seem to run in your family? If Yes, what condition and which relatives?   NO    Current Medications:   (please update all dermatological medications before printing patient's AVS!)      Current Outpatient Medications:     albuterol (PROVENTIL HFA,VENTOLIN HFA) 90 mcg/act inhaler, INHALE TWO PUFFS BY MOUTH EVERY 6 HOURS AS NEEDED FOR WHEEZING, Disp: 54 g, Rfl: 0    amLODIPine (NORVASC) 5 mg tablet, TAKE ONE TABLET BY MOUTH ONCE A DAY , Disp: 30 tablet, Rfl: 0    Choline Fenofibrate (Fenofibric Acid) 135 MG CPDR, Take 1 capsule (135 mg total) by mouth daily, Disp: 90 capsule, Rfl: 2    diclofenac sodium (VOLTAREN) 1 %, diclofenac 1 % topical gel  APPLY 2 GRAMS TO THE AFFECTED AREA(S) BY TOPICAL ROUTE 4 TIMES PER DAY, Disp: , Rfl:     dicyclomine (BENTYL) 20 mg tablet, Take 1 tablet (20 mg total) by mouth 2 (two) times a day, Disp: 20 tablet, Rfl: 0    EPIPEN 2-ARIEL 0 3 MG/0 3ML SOAJ, Inject one syringe (0 3mg) intramuscularly once as directed, Disp: 2 each, Rfl: 0    esomeprazole (NexIUM) 40 MG capsule, Take 1 capsule (40 mg total) by mouth 2 (two) times a day before meals, Disp: 60 capsule, Rfl: 5    famotidine (PEPCID) 40 MG tablet, Take 40 mg by mouth 2 (two) times a day, Disp: , Rfl:     insulin degludec Jarold Mariluz FlexTouch) 100 units/mL injection pen, 20 units subcut in hs, Disp: , Rfl:     Lidocaine-Menthol 4-1 % GEL, NuLido 4 %-1 % topical gel  Apply to affected area 2-4 times a day as needed for pain, Disp: , Rfl:     lisinopril (ZESTRIL) 20 mg tablet, TAKE ONE TABLET BY MOUTH EVERY DAY , Disp: 30 tablet, Rfl: 0    omega-3-acid ethyl esters (LOVAZA) 1 g capsule, Take 2 capsules (2 g total) by mouth 2 (two) times a day, Disp: 360 capsule, Rfl: 0    omeprazole (PriLOSEC) 20 mg delayed release capsule, Take 20 mg by mouth daily, Disp: , Rfl:     ondansetron (ZOFRAN-ODT) 4 mg disintegrating tablet, Take 1 tablet (4 mg total) by mouth every 8 (eight) hours as needed for nausea or vomiting, Disp: 30 tablet, Rfl: 0    oxyCODONE-acetaminophen (PERCOCET) 5-325 mg per tablet, Take 1 tablet by mouth every 6 (six) hours as needed for moderate painMax Daily Amount: 4 tablets, Disp: 20 tablet, Rfl: 0    pancrelipase, Lip-Prot-Amyl, (Creon) 12,000 units capsule, Take 12,000 units of lipase by mouth as needed for snacks, Disp: 90 capsule, Rfl: 2    Pancrelipase, Lip-Prot-Amyl, (Creon) 69801 units CPEP, Take 1 capsule (36,000 Units total) by mouth 3 (three) times a day before meals, Disp: 90 capsule, Rfl: 3    predniSONE 20 mg tablet, Take 1 tablet (20 mg total) by mouth daily, Disp: 5 tablet, Rfl: 0    pregabalin (LYRICA) 100 mg capsule, TAKE ONE CAPSULE BY MOUTH TWICE DAILY , Disp: 90 capsule, Rfl: 0    rosuvastatin (CRESTOR) 40 MG tablet, TAKE ONE TABLET BY MOUTH ONCE A DAY , Disp: 90 tablet, Rfl: 0    traZODone (DESYREL) 50 mg tablet, TAKE ONE TABLET BY MOUTH AT BEDTIME , Disp: 30 tablet, Rfl: 0      Review of Systems:  Have you recently had or currently have any of the following? If YES, what are you doing for the problem? · Fever, chills or unintended weight loss: No  · Sudden loss or change in your vision: No  · Nausea, vomiting or blood in your stool: No  · Painful or swollen joints: No  · Wheezing or cough: No  · Changing mole or non-healing wound: YES, Right side of nose  · Nosebleeds: No  · Excessive sweating: No  · Easy or prolonged bleeding? No  · Over the last 2 weeks, how often have you been bothered by the following problems? · Taking little interest or pleasure in doing things: 1 - Not at All  · Feeling down, depressed, or hopeless: 1 - Not at All  · Rapid heartbeat with epinephrine:  No    · FEMALES ONLY:    · Are you pregnant or planning to become pregnant? N/A  · Are you currently or planning to be nursing or breast feeding? N/A    · Any known allergies? Allergies   Allergen Reactions    Bee Venom Swelling   ·       Physical Exam:     Was a chaperone (Derm Clinical Assistant) present throughout the entire Physical Exam? Yes     Did the Dermatology Team specifically  the patient on the importance of a Full Skin Exam to be sure that nothing is missed clinically? Yes}  o Did the patient ultimately request or accept a Full Skin Exam?  Yes  o Did the patient specifically refuse to have the areas "under-the-bra" examined by the Dermatologist? No  o Did the patient specifically refuse to have the areas "under-the-underwear" examined by the Dermatologist? No    CONSTITUTIONAL:   Vitals:    06/08/21 1040   Temp: 97 7 °F (36 5 °C)   Weight: 91 6 kg (202 lb)   Height: 5' 11" (1 803 m)           PSYCH: Normal mood and affect  EYES: Normal conjunctiva  ENT: Normal lips and oral mucosa  CARDIOVASCULAR: No edema  RESPIRATORY: Normal respirations  HEME/LYMPH/IMMUNO:  No regional lymphadenopathy except as noted below in "ASSESSMENT AND PLAN BY DIAGNOSIS"    SKIN:  FULL ORGAN SYSTEM EXAM  Hair, Scalp, Ears, Face Normal except as noted below in Assessment   Neck, Cervical Chain Nodes Normal except as noted below in Assessment   Right Arm/Hand/Fingers Normal except as noted below in Assessment   Left Arm/Hand/Fingers Normal except as noted below in Assessment   Chest/Breasts/Axillae Viewed areas Normal except as noted below in Assessment   Abdomen, Umbilicus Normal except as noted below in Assessment   Back/Spine Normal except as noted below in Assessment   Groin/Genitalia/Buttocks NOT EXAMINED   Right Leg, Foot, Toes Normal except as noted below in Assessment   Left Leg, Foot, Toes Normal except as noted below in Assessment        Assessment and Plan by Diagnosis:    History of Present Condition:     Duration:  How long has this been an issue for you? o  6 months   Location Affected:  Where on the body is this affecting you?    o  Right side of nose   Quality:  Is there any bleeding, pain, itch, burning/irritation, or redness associated with the skin lesion?    o  Hard   Severity:  Describe any bleeding, pain, itch, burning/irritation, or redness on a scale of 1 to 10 (with 10 being the worst)    o  N/A   Timing:  Does this condition seem to be there pretty constantly or do you notice it more at specific times throughout the day?    o  Constantly   Context:  Have you ever noticed that this condition seems to be associated with specific activities you do?    o  No   Modifying Factors:    o Anything that seems to make the condition worse?    -  Wearing glasses   o What have you tried to do to make the condition better?    -  No   Associated Signs and Symptoms:  Does this skin lesion seem to be associated with any of the following:  o      MELANOCYTIC NEVI ("Moles")    Physical Exam:   Anatomic Location Affected:   Mostly on sun-exposed areas of the trunk and extremities   Morphological Description:  Scattered, 1-4mm round to ovoid, symmetrical-appearing, even bordered, skin colored to dark brown macules/papules, mostly in sun-exposed areas   Pertinent Positives:   Pertinent Negatives: Additional History of Present Condition:      Assessment and Plan:  Based on a thorough discussion of this condition and the management approach to it (including a comprehensive discussion of the known risks, side effects and potential benefits of treatment), the patient (family) agrees to implement the following specific plan:   When outside we recommend using a wide brim hat, sunglasses, long sleeve and pants, sunscreen with SPF 15+ with reapplication every 2 hours, or SPF specific clothing    Benign, reassured   Annual skin check     Melanocytic Nevi  Melanocytic nevi ("moles") are tan or brown, raised or flat areas of the skin which have an increased number of melanocytes  Melanocytes are the cells in our body which make pigment and account for skin color  Some moles are present at birth (I e , "congenital nevi"), while others come up later in life (i e , "acquired nevi")  The sun can stimulate the body to make more moles  Sunburns are not the only thing that triggers more moles  Chronic sun exposure can do it too       Clinically distinguishing a healthy mole from melanoma may be difficult, even for experienced dermatologists  The "ABCDE's" of moles have been suggested as a means of helping to alert a person to a suspicious mole and the possible increased risk of melanoma  The suggestions for raising alert are as follows:    Asymmetry: Healthy moles tend to be symmetric, while melanomas are often asymmetric  Asymmetry means if you draw a line through the mole, the two halves do not match in color, size, shape, or surface texture  Asymmetry can be a result of rapid enlargement of a mole, the development of a raised area on a previously flat lesion, scaling, ulceration, bleeding or scabbing within the mole  Any mole that starts to demonstrate "asymmetry" should be examined promptly by a board certified dermatologist      Border: Healthy moles tend to have discrete, even borders  The border of a melanoma often blends into the normal skin and does not sharply delineate the mole from normal skin  Any mole that starts to demonstrate "uneven borders" should be examined promptly by a board certified dermatologist      Color: Healthy moles tend to be one color throughout  Melanomas tend to be made up of different colors ranging from dark black, blue, white, or red  Any mole that demonstrates a color change should be examined promptly by a board certified dermatologist      Diameter: Healthy moles tend to be smaller than 0 6 cm in size; an exception are "congenital nevi" that can be larger  Melanomas tend to grow and can often be greater than 0 6 cm (1/4 of an inch, or the size of a pencil eraser)  This is only a guideline, and many normal moles may be larger than 0 6 cm without being unhealthy    Any mole that starts to change in size (small to bigger or bigger to smaller) should be examined promptly by a board certified dermatologist      Evolving: Healthy moles tend to "stay the same "  Melanomas may often show signs of change or evolution such as a change in size, shape, color, or elevation  Any mole that starts to itch, bleed, crust, burn, hurt, or ulcerate or demonstrate a change or evolution should be examined promptly by a board certified dermatologist         LENTIGO    Physical Exam:   Anatomic Location Affected:  Shoulders   Morphological Description:  Light brown macules   Pertinent Positives:   Pertinent Negatives: Additional History of Present Condition:      Assessment and Plan:  Based on a thorough discussion of this condition and the management approach to it (including a comprehensive discussion of the known risks, side effects and potential benefits of treatment), the patient (family) agrees to implement the following specific plan:   When outside we recommend using a wide brim hat, sunglasses, long sleeve and pants, sunscreen with SPF 14+ with reapplication every 2 hours, or SPF specific clothing       What is a lentigo? A lentigo is a pigmented flat or slightly raised lesion with a clearly defined edge  Unlike an ephelis (freckle), it does not fade in the winter months  There are several kinds of lentigo  The name lentigo originally referred to its appearance resembling a small lentil  The plural of lentigo is lentigines, although lentigos is also in common use  Who gets lentigines? Lentigines can affect males and females of all ages and races  Solar lentigines are especially prevalent in fair skinned adults  Lentigines associated with syndromes are present at birth or arise during childhood  What causes lentigines? Common forms of lentigo are due to exposure to ultraviolet radiation:   Sun damage including sunburn    Indoor tanning    Phototherapy, especially photochemotherapy (PUVA)    Ionizing radiation, eg radiation therapy, can also cause lentigines  Several familial syndromes associated with widespread lentigines originate from mutations in Kareem-MAP kinase, mTOR signaling and PTEN pathways  What is the treatment for lentigines?   Most lentigines are left alone  Attempts to lighten them may not be successful  The following approaches are used:   SPF 50+ broad-spectrum sunscreen    Hydroquinone bleaching cream    Alpha hydroxy acids    Vitamin C    Retinoids    Azelaic acid    Chemical peels  Individual lesions can be permanently removed using:   Cryotherapy    Intense pulsed light    Pigment lasers    How can lentigines be prevented? Lentigines associated with exposure ultraviolet radiation can be prevented by very careful sun protection  Clothing is more successful at preventing new lentigines than are sunscreens  What is the outlook for lentigines? Lentigines usually persist  They may increase in number with age and sun exposure  Some in sun-protected sites may fade and disappear  GRANDE ANGIOMAS    Physical Exam:   Anatomic Location Affected:  trunk   Morphological Description:  Scattered cherry red, 1-4 mm papules   Pertinent Positives:   Pertinent Negatives: Additional History of Present Condition:      Assessment and Plan:  Based on a thorough discussion of this condition and the management approach to it (including a comprehensive discussion of the known risks, side effects and potential benefits of treatment), the patient (family) agrees to implement the following specific plan:   Monitor for changes   Benign, reassured       Assessment and Plan:    Cherry angioma, also known as [employer] spots, are benign vascular skin lesions  A "cherry angioma" is a firm red, blue or purple papule, 0 1-1 cm in diameter  When thrombosed, they can appear black in colour until evaluated with a dermatoscope when the red or purple colour is more easily seen  Cherry angioma may develop on any part of the body but most often appear on the scalp, face, lips and trunk  An angioma is due to proliferating endothelial cells; these are the cells that line the inside of a blood vessel      Angiomas can arise in early life or later in life; the most common type of angioma is a cherry angioma  Cherry angiomas are very common in males and females of any age or race  They are more noticeable in white skin than in skin of colour  They markedly increase in number from about the age of 36  There may be a family history of similar lesions  Eruptive cherry angiomas have been rarely reported to be associated with internal malignancy  The cause of angiomas is unknown  Genetic analysis of cherry angiomas has shown that they frequently carry specific somatic missense mutations in the GNAQ and GNA11 (Q209H) genes, which are involved in other vascular and melanocytic proliferations  LIPOMA    Physical Exam:   Anatomic Location Affected:  Left forearm & Right nasal root   Morphological Description:     Mobile subcutaneous nodules   Pertinent Positives:   Pertinent Negatives: Additional History of Present Condition:  Irritated when resting arm while driving    Assessment and Plan:  Based on a thorough discussion of this condition and the management approach to it (including a comprehensive discussion of the known risks, side effects and potential benefits of treatment), the patient (family) agrees to implement the following specific plan:   Benign, assurance provided  Did discuss with patient that if he was interested in removal we could excise the Lipoma  Patient is interested in having these Lipoma's removed  Patient has been scheduled  Lipoma  A lipoma is a non-cancerous tumor that is made up of fat cells  It slowly grows under the skin in the subcutaneous tissue  A person may have a single lipoma or may have many lipomas  They are very common  Lipomas can occur in people of all ages, however, they tend to develop in adulthood and are most noticeable during middle age  They affect both sexes equally, although solitary lipomas are more common in women whilst multiple lipomas occur more frequently in men      The cause of lipomas is unknown  It is possible there may be genetic involvement as many patients with lipomas come from a family with a history of these tumors  Sometimes an injury such as a blunt blow to part of the body may trigger growth of a lipoma  People are often unaware of lipomas until they have grown large enough to become visible and palpable  This growth occurs slowly over several years  Some features of lipomas include:   A dome-shaped or egg-shaped lump about 2-10 cm in diameter (some may grow even larger)    It feels soft and smooth and is easily moved under the skin with the fingers    Some have a rubbery or doughy consistency    They are most common on the shoulders, neck, trunk and arms, but they can occur anywhere on the body where fat tissue is present  Most lipomas are symptomless, but some are painful on applying pressure  Lipomas that are tender or painful are usually angiolipomas  This means the lipoma has an increased number of small blood vessels  Painful lipomas are also a feature of adiposis dolorosa or Dercum disease  Diagnosis of lipoma is usually made clinically by finding a soft lump under the skin  However, if there is any doubt, a deep skin biopsy can be performed which will show typical histopathological features of lipoma and its variants  Most lipomas require no treatment  Most lipomas eventually stop growing and remain indefinitely without causing any problems   Occasionally, lipomas that interfere with the movement of adjacent muscles may require surgical removal  Several methods are available:   Simple surgical excision    Squeeze technique (a small incision is made over the lipoma and the fatty tissue is squeezed through the hole)    Liposuction                    Scribe Attestation    I,:  Peña Coronel am acting as a scribe while in the presence of the attending physician :       I,:  Lc Sorensen MD personally performed the services described in this documentation    as scribed in my presence :

## 2021-06-09 RX ORDER — PREGABALIN 100 MG/1
CAPSULE ORAL
Qty: 90 CAPSULE | Refills: 0 | Status: SHIPPED | OUTPATIENT
Start: 2021-06-09 | End: 2021-08-01

## 2021-06-29 DIAGNOSIS — I10 ESSENTIAL HYPERTENSION: ICD-10-CM

## 2021-06-30 RX ORDER — AMLODIPINE BESYLATE 5 MG/1
TABLET ORAL
Qty: 30 TABLET | Refills: 0 | Status: SHIPPED | OUTPATIENT
Start: 2021-06-30 | End: 2021-07-31

## 2021-06-30 RX ORDER — LISINOPRIL 20 MG/1
TABLET ORAL
Qty: 30 TABLET | Refills: 0 | Status: SHIPPED | OUTPATIENT
Start: 2021-06-30 | End: 2021-07-31

## 2021-07-13 DIAGNOSIS — E78.2 MIXED HYPERLIPIDEMIA: ICD-10-CM

## 2021-07-13 DIAGNOSIS — G47.00 INSOMNIA, UNSPECIFIED TYPE: ICD-10-CM

## 2021-07-14 DIAGNOSIS — G47.00 INSOMNIA, UNSPECIFIED TYPE: ICD-10-CM

## 2021-07-14 RX ORDER — ROSUVASTATIN CALCIUM 40 MG/1
TABLET, COATED ORAL
Qty: 90 TABLET | Refills: 0 | Status: SHIPPED | OUTPATIENT
Start: 2021-07-14 | End: 2021-10-12

## 2021-07-14 RX ORDER — ONDANSETRON 4 MG/1
TABLET, ORALLY DISINTEGRATING ORAL
Qty: 30 TABLET | Refills: 0 | Status: SHIPPED | OUTPATIENT
Start: 2021-07-14 | End: 2021-08-18

## 2021-07-14 RX ORDER — TRAZODONE HYDROCHLORIDE 50 MG/1
TABLET ORAL
Qty: 30 TABLET | Refills: 0 | Status: SHIPPED | OUTPATIENT
Start: 2021-07-14 | End: 2021-08-13

## 2021-07-29 ENCOUNTER — PROCEDURE VISIT (OUTPATIENT)
Dept: DERMATOLOGY | Facility: CLINIC | Age: 46
End: 2021-07-29
Payer: COMMERCIAL

## 2021-07-29 DIAGNOSIS — D17.9 LIPOMA, UNSPECIFIED SITE: Primary | ICD-10-CM

## 2021-07-29 PROCEDURE — 88305 TISSUE EXAM BY PATHOLOGIST: CPT | Performed by: STUDENT IN AN ORGANIZED HEALTH CARE EDUCATION/TRAINING PROGRAM

## 2021-07-29 PROCEDURE — 11403 EXC TR-EXT B9+MARG 2.1-3CM: CPT | Performed by: DERMATOLOGY

## 2021-07-29 PROCEDURE — 88304 TISSUE EXAM BY PATHOLOGIST: CPT | Performed by: STUDENT IN AN ORGANIZED HEALTH CARE EDUCATION/TRAINING PROGRAM

## 2021-07-29 PROCEDURE — 12031 INTMD RPR S/A/T/EXT 2.5 CM/<: CPT | Performed by: DERMATOLOGY

## 2021-07-29 PROCEDURE — 12051 INTMD RPR FACE/MM 2.5 CM/<: CPT | Performed by: DERMATOLOGY

## 2021-07-29 PROCEDURE — 11421 EXC H-F-NK-SP B9+MARG 0.6-1: CPT | Performed by: DERMATOLOGY

## 2021-07-29 NOTE — PATIENT INSTRUCTIONS
Rationale for Procedure  A skin excision allows the dermatologist to remove a lesion  The procedure involves a local numbing medication and removing the entire lesion  Typically, the lesion is being removed because it is atypical, traumatized, or for significant appearance reasons  The area will be open like a brush burn and allowed to heal    There will be no sutures  Tissue is sent to pathologist who will reconfirm diagnosis and verify completeness of lesion removal     Description of Procedure  We would like to perform a skin excision today  A local anesthetic, similar to the kind that a dentist uses when filling a cavity, will be injected with a very small needle into the skin area to be sampled  The injected skin and tissue underneath should go to sleep and become numbed so that no further pain should be felt  A scalpel will be used to cut around the lesion and tissue will be submitted to pathology for examination  Depending on the diagnosis the lesion will be excised with a certain amount of normal skin to help assure completeness of lesion removal   The physician will discuss in advance the anticipated size and extent of removal    Bleeding will occur, but it will stopped with direct pressure, sutures, and electrocautery  Surgical Vaseline-type ointment will also applied after the procedure to help create a barrier between the wound and the outside world  Risks and Potential Complications  The advantage of a skin excision is that it allows us to remove a problem lesion quickly  Although this usually permits the lesion to heal as soon as possible with the least scarring, there are some risks and potential complications that include but are not limited to the following:  - Some bleeding is normal at time of procedure and some bleeding on gauze is normal  the first few days after surgery    Profuse bleeding and bleeding with swelling and pain should be reported as detailed  below  - Infection is uncommon in skin surgery  Infection should be reported and is indicated by pain, redness, and discharge of purulent material   - Some dull to at time sharp pain could occur initially the day after surgery  Persistent pain or increasing pain days after surgery is not expected and should be reported  - Every effort is made to minimize scar, but location, size, and genetics do play a role in scar appearance  A surgical wound does not achieve its optimal appearance until 6 months  There are several treatments available if scarring would be problematic including scar creams, silicone pad, laser and scar revision   - Skin discoloration can occur especially in people of color  Its important to avoid sun on wound in first 6 months after surgery  Treatment is available if pigment is problematic   - Incomplete removal of the lesion or recurrence of lesion can occur and this would then require further treatment and more surgery   - Nerve Damage/Numbness/Loss of Function is very rare, but is most likely to occur if lesion is large or if it is in a high risk location  - Allergic Reaction to lidocaine is rare  More commonly,  epinephrine is used  with the lidocaine  Occasionally, epinephrine (aka adrenalin) may cause a brief  feeling of anxiety or jitteriness  - The person at the microscope  (pathologist) may provide additional information that was unexpected  This unexpected finding could provoke the need for additional treatment or evaluation  What You Will Need to Do After the Procedure  1  Keep the area clean and dry the first day  Try NOT to remove the bandage for the first day  2  Gently clean the area with soap and water and apply Vaseline ointment (this is over the counter and not a prescription) to the excision  site for up to 2 weeks  3  Apply a clean appropriately sized bandage to area  Gauze and paper tape are recommended for sensitive skin    4  Return for suture removal as instructed (generally 1 week for the face, 2 weeks for the body)  5  Take Acetaminophen (Tylenol) for discomfort, if no contraindications  Do NOT take Ibuprofen or aspirin unless specifically told to do so by your Dermatologist because these medications can make bleeding worse  6  Call our office immediately for signs of infection: fever, chills, increased redness, warmth, tenderness, discomfort/pain, or pus or foul smell coming from the wound  If bleeding is noticed, place a clean cloth over the area and apply firm pressure for thirty minutes  Check the wound ONLY after 30 minutes of direct pressure; do not cheat and sneak a peak, as that does not count  If bleeding persists after 30 minutes of legitimate direct pressure, then try one more round of direct pressure for an additional 10 minutes to the area  Should the bleeding become heavier or not stop after the second attempt, call [facility] directly at [phone] (SKIN) or, if after hours, go to your local Emergency Room/Emergency Department

## 2021-07-29 NOTE — PROGRESS NOTES
PROCEDURE:  EXCISION WITH INTERMEDIATE LAYERED CLOSURE     Attending: Marley Courser   Assistant: Heriberto Peña    Pre-Op Diagnosis: Lipoma   Post-Op Diagnosis: Same   Benign versus Malignant Benign       A: Lesion Anatomic Location: Right Nasal Root    Pre-op size: 0 8 cm  Size of defect: 0 8 cm    Final repaired wound length:  1 cm    B; Lesion Anatomic Location: Left Elbow    Pre-op size: 2 2 cm  Size of defect: 2 2 cm  Final repaired wound length:  2 cm    Written and verbal, witnessed informed consent was obtained  I discussed that excision is a method of removing lesions both benign and malignant lesions  A portion of normal skin is often taken to ensure completeness of removal   I reviewed that procedure will include numbing the area,  cutting around and under defect, undermining tissue, and closing the wound with sutures both inside and out  These sutures are usually removed in 7 to 14 days  Risks (bleeding, pain, infection, scarring, recurrence) and benefits discussed  It was discussed with patient that every effort is made to minimize scar, but scarring is influenced also by extrinsic factor such as location, age and genetics  Time Out: performed:  yes  Correct patient: yes  Correct site per Clinic Report: yes  Correct site per Patient Report: yes    LOCAL ANESTHESIA: 1% xylocaine with epi     DESCRIPTION OF PROCEDURE: The patient was brought back into the procedure room, anesthetized locally, prepped and draped in the usual fashion  Using a #15 blade with a scalpel, the lesion was excised in elliptical fashion  The wound was  undermined in the  fascial plane  Hemostasis was achieved with light electrocoagulation  Purpose of undermining was to decrease wound tension and facilitate closure  A: The wound was closed with subcutaneous sutures as follows:      Deep suture:4-0 Monocryl Nose     Epidermal edge closure was accomplished with  Steri strips with benzoin    B   The wound was closed with subcutaneous sutures as follows:    Deep suture:4-0 Vicryl Forearm       Epidermal edge closure was accomplished with superficial sutures as follows:    Superficial suture: 4-0 Ethilon  Superficial suture type: Interrupted       Estimated blood loss less than 3ml  The patient tolerated the procedure well without any complications  The wound was cleaned with sterile saline, dried off, surgical vaseline ointment was applied, and the wound was covered  A pressure dressing was applied for stabilization and light pressure  The patient was given detailed oral and written instructions on postoperative care as detailed in consent  The patient left in good medical condition  POSTOP DISCUSSION DISCUSSION AND INSTRUCTIONS FOR PATIENT      Rationale for Procedure  A skin excision allows the dermatologist to remove a lesion  The procedure involves a local numbing medication and removing the entire lesion  Typically, the lesion is being removed because it is atypical, traumatized, or for significant appearance reasons  The area will be open like a brush burn and allowed to heal    There will be no sutures  Tissue is sent to pathologist who will reconfirm diagnosis and verify completeness of lesion removal     Description of Procedure  We would like to perform a skin excision today  A local anesthetic, similar to the kind that a dentist uses when filling a cavity, will be injected with a very small needle into the skin area to be sampled  The injected skin and tissue underneath should go to sleep and become numbed so that no further pain should be felt  A scalpel will be used to cut around the lesion and tissue will be submitted to pathology for examination    Depending on the diagnosis the lesion will be excised with a certain amount of normal skin to help assure completeness of lesion removal   The physician will discuss in advance the anticipated size and extent of removal    Bleeding will occur, but it will stopped with direct pressure, sutures, and electrocautery  Surgical Vaseline-type ointment will also applied after the procedure to help create a barrier between the wound and the outside world  Risks and Potential Complications  The advantage of a skin excision is that it allows us to remove a problem lesion quickly  Although this usually permits the lesion to heal as soon as possible with the least scarring, there are some risks and potential complications that include but are not limited to the following:  - Some bleeding is normal at time of procedure and some bleeding on gauze is normal  the first few days after surgery  Profuse bleeding and bleeding with swelling and pain should be reported as detailed  below  - Infection is uncommon in skin surgery  Infection should be reported and is indicated by pain, redness, and discharge of purulent material   - Some dull to at time sharp pain could occur initially the day after surgery  Persistent pain or increasing pain days after surgery is not expected and should be reported  - Every effort is made to minimize scar, but location, size, and genetics do play a role in scar appearance  A surgical wound does not achieve its optimal appearance until 6 months  There are several treatments available if scarring would be problematic including scar creams, silicone pad, laser and scar revision   - Skin discoloration can occur especially in people of color  Its important to avoid sun on wound in first 6 months after surgery  Treatment is available if pigment is problematic   - Incomplete removal of the lesion or recurrence of lesion can occur and this would then require further treatment and more surgery   - Nerve Damage/Numbness/Loss of Function is very rare, but is most likely to occur if lesion is large or if it is in a high risk location  - Allergic Reaction to lidocaine is rare  More commonly,  epinephrine is used  with the lidocaine    Occasionally, epinephrine (aka adrenalin) may cause a brief  feeling of anxiety or jitteriness  - The person at the microscope  (pathologist) may provide additional information that was unexpected  This unexpected finding could provoke the need for additional treatment or evaluation  What You Will Need to Do After the Procedure  1  Keep the area clean and dry the first day  Try NOT to remove the bandage for the first day  2  Gently clean the area with soap and water and apply Vaseline ointment (this is over the counter and not a prescription) to the excision  site for up to 2 weeks  3  Apply a clean appropriately sized bandage to area  Gauze and paper tape are recommended for sensitive skin  4  Return for suture removal as instructed (generally 1 week for the face, 2 weeks for the body)  5  Take Acetaminophen (Tylenol) for discomfort, if no contraindications  Do NOT take Ibuprofen or aspirin unless specifically told to do so by your Dermatologist because these medications can make bleeding worse  6  Call our office immediately for signs of infection: fever, chills, increased redness, warmth, tenderness, discomfort/pain, or pus or foul smell coming from the wound  If bleeding is noticed, place a clean cloth over the area and apply firm pressure for thirty minutes  Check the wound ONLY after 30 minutes of direct pressure; do not cheat and sneak a peak, as that does not count  If bleeding persists after 30 minutes of legitimate direct pressure, then try one more round of direct pressure for an additional 10 minutes to the area  Should the bleeding become heavier or not stop after the second attempt, call [facility] directly at [phone] (SKIN) or, if after hours, go to your local Emergency Room/Emergency Department        Scribe Attestation    I,:  Matt Ham MA am acting as a scribe while in the presence of the attending physician :       I,:  Ariella Victor MD personally performed the services described in this documentation    as scribed in my presence :

## 2021-07-30 DIAGNOSIS — G89.4 CHRONIC PAIN SYNDROME: ICD-10-CM

## 2021-07-30 DIAGNOSIS — I10 ESSENTIAL HYPERTENSION: ICD-10-CM

## 2021-07-31 RX ORDER — AMLODIPINE BESYLATE 5 MG/1
TABLET ORAL
Qty: 30 TABLET | Refills: 0 | Status: SHIPPED | OUTPATIENT
Start: 2021-07-31 | End: 2021-08-31

## 2021-07-31 RX ORDER — LISINOPRIL 20 MG/1
TABLET ORAL
Qty: 30 TABLET | Refills: 0 | Status: SHIPPED | OUTPATIENT
Start: 2021-07-31 | End: 2021-08-31

## 2021-08-01 RX ORDER — PREGABALIN 100 MG/1
CAPSULE ORAL
Qty: 90 CAPSULE | Refills: 0 | Status: SHIPPED | OUTPATIENT
Start: 2021-08-01 | End: 2021-09-16 | Stop reason: SDUPTHER

## 2021-08-05 ENCOUNTER — OFFICE VISIT (OUTPATIENT)
Dept: PAIN MEDICINE | Facility: CLINIC | Age: 46
End: 2021-08-05
Payer: COMMERCIAL

## 2021-08-05 VITALS
BODY MASS INDEX: 28.56 KG/M2 | DIASTOLIC BLOOD PRESSURE: 68 MMHG | SYSTOLIC BLOOD PRESSURE: 116 MMHG | WEIGHT: 204 LBS | HEIGHT: 71 IN | HEART RATE: 80 BPM

## 2021-08-05 DIAGNOSIS — M47.812 CERVICAL SPONDYLOSIS: Primary | ICD-10-CM

## 2021-08-05 DIAGNOSIS — M54.50 ACUTE BILATERAL LOW BACK PAIN, UNSPECIFIED WHETHER SCIATICA PRESENT: ICD-10-CM

## 2021-08-05 DIAGNOSIS — R20.2 NUMBNESS AND TINGLING IN RIGHT HAND: ICD-10-CM

## 2021-08-05 DIAGNOSIS — R20.0 NUMBNESS AND TINGLING IN RIGHT HAND: ICD-10-CM

## 2021-08-05 PROCEDURE — 99214 OFFICE O/P EST MOD 30 MIN: CPT | Performed by: STUDENT IN AN ORGANIZED HEALTH CARE EDUCATION/TRAINING PROGRAM

## 2021-08-05 RX ORDER — METHYLPREDNISOLONE 4 MG/1
TABLET ORAL
Qty: 1 EACH | Refills: 0 | OUTPATIENT
Start: 2021-08-05 | End: 2021-09-14

## 2021-08-05 NOTE — PROGRESS NOTES
Pain Medicine Follow-Up Note    Assessment:  1  Cervical spondylosis    2  Acute bilateral low back pain, unspecified whether sciatica present    3  Numbness and tingling in right hand        Plan:  Orders Placed This Encounter   Procedures    FL spine and pain procedure     Standing Status:   Future     Standing Expiration Date:   8/5/2025     Scheduling Instructions:      More symptomatic side first     Order Specific Question:   Reason for Exam:     Answer:   B/L CERVICAL c3-6 mbb#1     Order Specific Question:   Anticoagulant hold needed? Answer:   no    MRI lumbar spine wo contrast     Standing Status:   Future     Standing Expiration Date:   8/5/2025     Scheduling Instructions: There is no preparation for this test  Please leave your jewelry and valuables at home, wedding rings are the exception  Magnetic nail polish must be removed prior to arrival for your test  Please bring your insurance cards, a form of photo ID and a list of your medications with you  Arrive 15       minutes prior to your appointment time in order to register  Please bring any prior CT or MRI studies of this area that were not performed at a [facility] facility  To schedule this appointment, please contact Central Scheduling at [phone]  Prior to your appointment, please make sure you complete the MRI Screening Form when you e-Check in for your appointment  This will be available starting 7 days before your appointment in [address]  You may receive an e-mail with an activation code if you do not have a [employer] account  If you do not       have access to a device, we will complete your screening at your appointment  Order Specific Question:   What is the patient's sedation requirement? Answer:   No Sedation     Order Specific Question:   Release to patient through [employer]     Answer:   Immediate     Order Specific Question:   Is order priority selected as STAT?      Answer:   No     Order Specific Question:   Reason for Exam (FREE TEXT)     Answer:   lumbar radiculopathy    EMG 1 Limb     Standing Status:   Future     Standing Expiration Date:   8/5/2022     Order Specific Question:   Location:     Answer:   Right upper     Order Specific Question:   Reason for Exam:     Answer:   CERVICAL RADICULOPATHY VS CARPAL TUNNEL SYNDROME     Order Specific Question:   Possible Diagnosis: Answer:   median neuropathy (carpal tunnel syndrome)       New Medications Ordered This Visit   Medications    methylPREDNISolone 4 MG tablet therapy pack     Sig: Use as directed on package     Dispense:  1 each     Refill:  0       My impressions and treatment recommendations were discussed in detail with the patient who verbalized understanding and had no further questions  This is a 49-year-old male who presents to our office with increasing neck and low back pain  His issues have gotten progressively worse  He underwent recent cervical epidural steroid injection with limited relief  Appears much of his pain in the neck is axial in nature  We discussed cervical medial branch block at the C3-C6 level starting with the more symptomatic side 1st   If greater than 80% relief, would move forward with radiofrequency ablation  In regards to his low back pain, he has significant extension based low back pain in also reported intermittent leg numbness  He has some pain limited weakness noted on exam, but appears to have non pain limited weakness with bilateral dorsiflexion and plantar flexion  Will order MRI to rule out compressive pathology  He also has axial component to his symptoms  Discussed that we will need to work this up further to find the exact cause  He is also complaining of bilateral hand tingling in the median nerve distribution, right greater than left  He notes this is more noticeable when he has is right hand on the steering wheel for prolonged period of time    Patient drives long distances is a   Had positive Tinel's test and Phalen's on exam today, right greater than left  Will order EMG/NCV to rule out carpal tunnel syndrome  Based on results, may be need to hand surgery  Less inclined to think this is secondary to cervical radiculopathy  Also, due to patient's acute pain, will order Medrol Dosepak to help with more immediate pain control while he is undergoing studies and diagnostic blocks  [place] Prescription Drug Monitoring Program report was reviewed and was appropriate     Complete risks and benefits including bleeding, infection, tissue reaction, nerve injury and allergic reaction were discussed  The approach was demonstrated using models and literature was provided  Verbal and written consent was obtained  Discharge instructions were provided  I personally saw and examined the patient and I agree with the above discussed plan of care  History of Present Illness:    Molina Roger is a 48 y o  male who presents to [facility] for interval re-evaluation of the above stated pain complaints  The patient has a past medical and chronic pain history as outlined in the assessment section  He was last seen on 05/13/2021 for cervical epidural steroid injection  Reports that the injection helped for about 4 weeks  He returns today with worse symptoms  Pain is 8/10  Pain is most notable in the neck and low back  Pain is worse in the morning, evening  Pain is constant       Reports while driving reports tingling in both hands  Especially notable in the right when his hands on the steering wheel for prolonged period of time  Appears to describe issues in a median nerve distribution  Feels like pain is now also "traveling down" the back and low back pain has gotten worse  Reports pain has never been this bad  Reports intermittent pain radiating down legs while driving due to bouncing up and down       Other than as stated above, the patient denies any interval changes in medications, medical condition, mental condition, symptoms, or allergies since the last office visit  Review of Systems:    Review of Systems   Respiratory: Negative for shortness of breath  Cardiovascular: Negative for chest pain  Gastrointestinal: Negative for constipation, diarrhea, nausea and vomiting  Musculoskeletal: Positive for gait problem  Negative for arthralgias, joint swelling and myalgias  Pain in back   Skin: Negative for rash  Neurological: Negative for dizziness, seizures and weakness  All other systems reviewed and are negative          Patient Active Problem List   Diagnosis    Pancreatic lesion    Renal mass    Encounter for smoking cessation counseling    Leukocytosis    RBBB    Epigastric pain    Acute gastritis without hemorrhage    Generalized abdominal pain    Abnormal transaminases    Acute on chronic pancreatitis (HCC)    Leukopenia    Hypertriglyceridemia    Chronic pain syndrome    Esophagitis    Other chronic pancreatitis (HCC)    Chronic gastritis without bleeding    Uncomplicated opioid dependence ([facility] [address] )    Long-term current use of opiate analgesic    GERD (gastroesophageal reflux disease)    Hyponatremia    Healthcare maintenance    Elevated blood pressure reading    Upper abdominal pain    Hypertension    Pancreatitis, unspecified pancreatitis type    Abdominal pain, epigastric    Change in bowel habits    Musculoskeletal neck pain       Past Medical History:   Diagnosis Date    Asthma     Chronic pain disorder     Diabetes mellitus ([facility] [address] )     GERD (gastroesophageal reflux disease)     Hyperlipidemia     Liver abscess     Meniscus tear     left knee work injury last assessed 08/24/2016    Pancreatitis     Pneumonia        Past Surgical History:   Procedure Laterality Date    CELIAC PLEXUS BLOCK Bilateral 10/29/2018    Procedure: SPLANCHNIC NERVE BLOCK;  Surgeon: Arsh Roman MD; [facility];  Service: Pain Management     CELIAC PLEXUS BLOCK Bilateral 1/10/2019    Procedure: SPLANCHNIC NERVE BLOCK;  Surgeon: Mack Jones MD;  [facility];  Service: Pain Management     CHOLECYSTECTOMY      ESOPHAGOGASTRODUODENOSCOPY N/A 11/16/2017    Procedure: ESOPHAGOGASTRODUODENOSCOPY (EGD); Surgeon: Deirdre Goodman MD;  [facility]; Service: Gastroenterology    ESOPHAGOGASTRODUODENOSCOPY N/A 4/19/2018    Procedure: ESOPHAGOGASTRODUODENOSCOPY (EGD); Surgeon: Manav Roberson MD;  [facility]; Service: Gastroenterology    ESOPHAGOGASTRODUODENOSCOPY N/A 5/10/2018    Procedure: ESOPHAGOGASTRODUODENOSCOPY (EGD); Surgeon: Prabhjot Garcia MD;  [facility];   Service: Gastroenterology    IR TEMPORARY DIALYSIS CATHETER PLACEMENT  2/28/2019    KNEE ARTHROSCOPY Bilateral     KNEE ARTHROSCOPY Right 2009    cibishino last assessed 08/24/2016    KNEE ARTHROSCOPY Right 07/01/2019    LIVER SURGERY      NERVE BLOCK Bilateral 5/29/2018    Procedure: SPLANCHNIC NERVE BLOCK;  Surgeon: Mack Jones MD;  [facility];  Service: Pain Management     PANCREAS SURGERY      stents    PANCREATIC CYST EXCISION      NE INJECT NERV BLCK,CELIAC PLEXUS Bilateral 5/9/2017    Procedure: CELIAC PLEXUS BLOCK ;  Surgeon: Mack Jones MD;  [facility];  Service: Pain Management     NE INJECT NERV BLCK,CELIAC PLEXUS Bilateral 6/1/2017    Procedure: SPLANCHNIC NERVE BLOCK at T12;  Surgeon: Mack Jones MD;  [facility];  Service: Pain Management     NE INJECT NERV BLCK,CELIAC PLEXUS Bilateral 8/8/2017    Procedure: BILATERAL SPLANCHNIC NERVE BLOCK T12;  Surgeon: Mack Jones MD;  [facility];  Service: Pain Management     NE INJECT NERV BLCK,CELIAC PLEXUS Bilateral 4/18/2019    Procedure: BLOCK / INJECTION CELIAC PLEXUS;  Surgeon: Mack Jones MD;  [facility];  Service: Pain Management     NE INJECT NERV [address] Bilateral 2019    Procedure: SPLANCHNIC NERVE BLOCK;  Surgeon: Deborah Kumar MD;  [facility];  Service: Pain Management     IL INJECT NERV BLCK,CELIAC PLEXUS Bilateral 10/17/2019    Procedure: SPLANCHNIC NERVE BLOCK;  Surgeon: Deborah Kumar MD;  [facility];  Service: Pain Management     IL INJECT NERV BLCK,PARAVERT SYMPATH Bilateral 2017    Procedure: SPLANCHNIC NERVE BLOCK;  Surgeon: Deborah Kumar MD;  [facility];  Service: Pain Management     IL LAP,CHOLECYSTECTOMY N/A 2017    Procedure: LAPAROSCOPIC CHOLECYSTECTOMY, IOC, POSSIBLE OPEN ;  Surgeon: Cisco Pace MD;  [facility];  Service: General    ROTATOR CUFF REPAIR Right     SHOULDER ARTHROSCOPY      SHOULDER ARTHROSCOPY Right        Family History   Problem Relation Age of Onset    Cirrhosis Mother     Heart disease Other         cardiac disorder    Cancer Other        Social History     Occupational History    Occupation: fulltime employment   Tobacco Use    Smoking status: Former Smoker     Packs/day: 1 00     Years: 20 00     Pack years: 20 00     Quit date: 3/15/2021     Years since quittin 3    Smokeless tobacco: Never Used   Vaping Use    Vaping Use: Never used   Substance and Sexual Activity    Alcohol use: Not Currently     Alcohol/week: 10 0 standard drinks     Types: 10 Cans of beer per week     Comment: quit    Drug use: No    Sexual activity: Yes     Partners: Female         Current Outpatient Medications:     albuterol (PROVENTIL HFA,VENTOLIN HFA) 90 mcg/act inhaler, INHALE TWO PUFFS BY MOUTH EVERY 6 HOURS AS NEEDED FOR WHEEZING, Disp: 54 g, Rfl: 0    amLODIPine (NORVASC) 5 mg tablet, TAKE ONE TABLET BY MOUTH EVERY DAY , Disp: 30 tablet, Rfl: 0    Choline Fenofibrate (Fenofibric Acid) 135 MG CPDR, Take 1 capsule (135 mg total) by mouth daily, Disp: 90 capsule, Rfl: 2    EPIPEN 2-ARIEL 0 3 MG/0 3ML SOAJ, Inject one syringe (0 3mg) intramuscularly once as directed, Disp: 2 each, Rfl: 0    esomeprazole (NexIUM) 40 MG capsule, Take 1 capsule (40 mg total) by mouth 2 (two) times a day before meals, Disp: 60 capsule, Rfl: 5    famotidine (PEPCID) 40 MG tablet, Take 40 mg by mouth 2 (two) times a day, Disp: , Rfl:     insulin degludec Lindia Matsu FlexTouch) 100 units/mL injection pen, 20 units subcut in hs, Disp: , Rfl:     lisinopril (ZESTRIL) 20 mg tablet, TAKE ONE TABLET BY MOUTH EVERY DAY , Disp: 30 tablet, Rfl: 0    omega-3-acid ethyl esters (LOVAZA) 1 g capsule, Take 2 capsules (2 g total) by mouth 2 (two) times a day, Disp: 360 capsule, Rfl: 0    ondansetron (ZOFRAN-ODT) 4 mg disintegrating tablet, DISSOLVE ONE TABLET IN MOUTH EVERY EIGHT HOURS AS NEEDED for nausea or vomiting, Disp: 30 tablet, Rfl: 0    pancrelipase, Lip-Prot-Amyl, (Creon) 12,000 units capsule, Take 12,000 units of lipase by mouth as needed for snacks, Disp: 90 capsule, Rfl: 2    Pancrelipase, Lip-Prot-Amyl, (Creon) 89813 units CPEP, Take 1 capsule (36,000 Units total) by mouth 3 (three) times a day before meals, Disp: 90 capsule, Rfl: 3    pregabalin (LYRICA) 100 mg capsule, TAKE ONE CAPSULE BY MOUTH TWICE DAILY , Disp: 90 capsule, Rfl: 0    rosuvastatin (CRESTOR) 40 MG tablet, TAKE ONE TABLET BY MOUTH ONCE A DAY , Disp: 90 tablet, Rfl: 0    traZODone (DESYREL) 50 mg tablet, TAKE ONE TABLET BY MOUTH NIGHTLY AT BEDTIME , Disp: 30 tablet, Rfl: 0    diclofenac sodium (VOLTAREN) 1 %, diclofenac 1 % topical gel  APPLY 2 GRAMS TO THE AFFECTED AREA(S) BY TOPICAL ROUTE 4 TIMES PER DAY (Patient not taking: Reported on 8/5/2021), Disp: , Rfl:     dicyclomine (BENTYL) 20 mg tablet, Take 1 tablet (20 mg total) by mouth 2 (two) times a day (Patient not taking: Reported on 8/5/2021), Disp: 20 tablet, Rfl: 0    Lidocaine-Menthol 4-1 % GEL, NuLido 4 %-1 % topical gel  Apply to affected area 2-4 times a day as needed for pain (Patient not taking: Reported on 8/5/2021), Disp: , Rfl:     methylPREDNISolone 4 MG tablet therapy pack, Use as directed on package, Disp: 1 each, Rfl: 0    omeprazole (PriLOSEC) 20 mg delayed release capsule, Take 20 mg by mouth daily, Disp: , Rfl:     oxyCODONE-acetaminophen (PERCOCET) 5-325 mg per tablet, Take 1 tablet by mouth every 6 (six) hours as needed for moderate painMax Daily Amount: 4 tablets (Patient not taking: Reported on 8/5/2021), Disp: 20 tablet, Rfl: 0    Allergies   Allergen Reactions    Bee Venom Swelling       Physical Exam:    /68   Pulse 80   Ht 5' 11" (1 803 m)   Wt 92 5 kg (204 lb)   BMI 28 45 kg/m²     Constitutional:normal, well developed, well nourished, alert, in no distress and non-toxic and no overt pain behavior  Eyes:anicteric  HEENT:grossly intact  Neck:supple, symmetric, trachea midline and no masses   Pulmonary:even and unlabored  Cardiovascular:No edema or pitting edema present  Skin:Normal without rashes or lesions and well hydrated  Psychiatric:Mood and affect appropriate  Neurologic:Cranial Nerves II-XII grossly intact  Musculoskeletal:normal Tinel's test and Phalen's test positive on the right  Lumbar Spine Exam    Appearance:  Normal lordosis   Palpation/Tenderness:  left lumbar paraspinal tenderness  right lumbar paraspinal tenderness  Sensory:  no sensory deficits noted  Range of Motion:  Flexion:   Moderately limited  with pain  Extension:  Severely limited  with pain  Motor Strength:  Left hip flexion:  4/5  Left hip extension:  4/5  Right hip flexion:  4/5  Right hip extension:  4/5  Left knee flexion:  4/5  Left knee extension:  4/5  Right knee flexion:  4/5  Right knee extension:  4/5  Left foot dorsiflexion:  4/5  Left foot plantar flexion:  4/5  Right foot dorsiflexion:  4/5  Right foot plantar flexion:  4/5   True weakness with bilateral dorsiflexion and plantarflexion  Reflexes:  Left Patellar:  2+   Right Patellar:  2+   Left Achilles:  2+   Right Achilles:  2+    Lumbar facet loading positive bilaterally  Special Tests:  Seated slump test negative bilaterally        Imaging  FL spine and pain procedure    (Results Pending)   MRI lumbar spine wo contrast    (Results Pending)         Orders Placed This Encounter   Procedures    FL spine and pain procedure    MRI lumbar spine wo contrast    EMG 1 Limb

## 2021-08-12 ENCOUNTER — OFFICE VISIT (OUTPATIENT)
Dept: DERMATOLOGY | Facility: CLINIC | Age: 46
End: 2021-08-12

## 2021-08-12 DIAGNOSIS — L72.0 EPIDERMAL INCLUSION CYST: Primary | ICD-10-CM

## 2021-08-12 PROCEDURE — RECHECK: Performed by: DERMATOLOGY

## 2021-08-13 ENCOUNTER — TELEPHONE (OUTPATIENT)
Dept: FAMILY MEDICINE CLINIC | Facility: CLINIC | Age: 46
End: 2021-08-13

## 2021-08-13 DIAGNOSIS — G47.00 INSOMNIA, UNSPECIFIED TYPE: ICD-10-CM

## 2021-08-13 RX ORDER — TRAZODONE HYDROCHLORIDE 50 MG/1
TABLET ORAL
Qty: 30 TABLET | Refills: 0 | Status: SHIPPED | OUTPATIENT
Start: 2021-08-13 | End: 2021-09-07

## 2021-08-13 NOTE — TELEPHONE ENCOUNTER
Patient called stating he was carrying his dog down the stairs and he felt a pop in his neck and his hands started tingling  He called his pain management and they advised to go to the ER or be seen by us  I spoke with the nurses who advised the patient to be seen by the ER in case any nerve, muscle, or spine was damaged  Patient is aware and will go to the ER

## 2021-08-17 DIAGNOSIS — K29.30 CHRONIC SUPERFICIAL GASTRITIS WITHOUT BLEEDING: ICD-10-CM

## 2021-08-18 DIAGNOSIS — G47.00 INSOMNIA, UNSPECIFIED TYPE: ICD-10-CM

## 2021-08-18 RX ORDER — ONDANSETRON 4 MG/1
TABLET, ORALLY DISINTEGRATING ORAL
Qty: 30 TABLET | Refills: 0 | Status: SHIPPED | OUTPATIENT
Start: 2021-08-18 | End: 2021-10-03

## 2021-08-18 RX ORDER — ESOMEPRAZOLE MAGNESIUM 40 MG/1
CAPSULE, DELAYED RELEASE ORAL
Qty: 60 CAPSULE | Refills: 0 | Status: SHIPPED | OUTPATIENT
Start: 2021-08-18 | End: 2021-09-20

## 2021-08-18 NOTE — RESULT ENCOUNTER NOTE
DERMATOPATHOLOGY RESULT NOTE    Results reviewed by ordering physician  Reviewed  [employer]          Instructions for Clinical Derm Team:   (remember to route Result Note to appropriate staff):    None    Result & Plan by Specimen:    Specimen A: benign  Plan: reassured, benign      Specimen B: benign  Plan: reassured, benign

## 2021-08-24 ENCOUNTER — HOSPITAL ENCOUNTER (OUTPATIENT)
Dept: MRI IMAGING | Facility: CLINIC | Age: 46
Discharge: HOME | End: 2021-08-24
Payer: COMMERCIAL

## 2021-08-24 ENCOUNTER — HOSPITAL ENCOUNTER (OUTPATIENT)
Dept: RADIOLOGY | Facility: CLINIC | Age: 46
Discharge: HOME | End: 2021-08-24
Admitting: STUDENT IN AN ORGANIZED HEALTH CARE EDUCATION/TRAINING PROGRAM
Payer: COMMERCIAL

## 2021-08-24 VITALS
RESPIRATION RATE: 20 BRPM | OXYGEN SATURATION: 95 % | DIASTOLIC BLOOD PRESSURE: 58 MMHG | HEART RATE: 82 BPM | TEMPERATURE: 98.2 F | SYSTOLIC BLOOD PRESSURE: 115 MMHG

## 2021-08-24 DIAGNOSIS — M47.812 CERVICAL SPONDYLOSIS: ICD-10-CM

## 2021-08-24 DIAGNOSIS — M54.50 ACUTE BILATERAL LOW BACK PAIN, UNSPECIFIED WHETHER SCIATICA PRESENT: ICD-10-CM

## 2021-08-24 PROCEDURE — G1004 CDSM NDSC: HCPCS

## 2021-08-24 PROCEDURE — 72148 MRI LUMBAR SPINE W/O DYE: CPT

## 2021-08-24 PROCEDURE — 64490 INJ PARAVERT F JNT C/T 1 LEV: CPT | Performed by: STUDENT IN AN ORGANIZED HEALTH CARE EDUCATION/TRAINING PROGRAM

## 2021-08-24 PROCEDURE — 64491 INJ PARAVERT F JNT C/T 2 LEV: CPT | Performed by: STUDENT IN AN ORGANIZED HEALTH CARE EDUCATION/TRAINING PROGRAM

## 2021-08-24 PROCEDURE — 64492 INJ PARAVERT F JNT C/T 3 LEV: CPT | Performed by: STUDENT IN AN ORGANIZED HEALTH CARE EDUCATION/TRAINING PROGRAM

## 2021-08-24 RX ORDER — BUPIVACAINE HCL/PF 2.5 MG/ML
2 VIAL (ML) INJECTION ONCE
Status: COMPLETED | OUTPATIENT
Start: 2021-08-24 | End: 2021-08-24

## 2021-08-24 RX ADMIN — Medication 2 ML: at 13:45

## 2021-08-24 NOTE — DISCHARGE INSTR - LAB
ACTIVITY  · Please do activities that will bring on the normal pain that we are rating  For example, if vacuuming or walking increases the pain, do that  This will give the most accurate response to the diary  · You may shower, but no tub baths today, or applied heat  CARE OF THE INJECTION SITE  · This area may be numb for several hours after the injection  · Notify the [facility] if you have any of the following:  redness, drainage, swelling, or fever above 100°F     SPECIAL INSTRUCTIONS  · Please return the MBB diary to our office by mail, fax, or drop it off  MEDICATIONS  · Please do not take any break through or short acting pain medications for 8 hours after the block  · Continue to take all routine medications  · Our office may have instructed you to hold some medications  As no general anesthesia was used in today's procedure, you should not experience any side effects related to anesthesia  If you have a problem specifically related to your procedure, please call our office at [phone]  Problems not related to your procedure should be directed to your primary care physician

## 2021-08-25 LAB
HBA1C MFR BLD: 5.3 % OF TOTAL HGB
TRIGL SERPL-MCNC: 3135 MG/DL

## 2021-08-27 ENCOUNTER — TELEPHONE (OUTPATIENT)
Dept: GASTROENTEROLOGY | Facility: CLINIC | Age: 46
End: 2021-08-27

## 2021-08-27 ENCOUNTER — HOSPITAL ENCOUNTER (EMERGENCY)
Facility: HOSPITAL | Age: 46
Discharge: HOME | End: 2021-08-27
Attending: EMERGENCY MEDICINE | Admitting: EMERGENCY MEDICINE
Payer: COMMERCIAL

## 2021-08-27 VITALS
RESPIRATION RATE: 16 BRPM | HEART RATE: 94 BPM | TEMPERATURE: 98 F | OXYGEN SATURATION: 96 % | SYSTOLIC BLOOD PRESSURE: 125 MMHG | DIASTOLIC BLOOD PRESSURE: 76 MMHG

## 2021-08-27 DIAGNOSIS — E78.1 HYPERTRIGLYCERIDEMIA: Primary | ICD-10-CM

## 2021-08-27 LAB
ALBUMIN SERPL BCP-MCNC: 4 G/DL (ref 3.5–5)
ALP SERPL-CCNC: 166 U/L (ref 46–116)
ALT SERPL W P-5'-P-CCNC: 153 U/L (ref 12–78)
ANION GAP SERPL CALCULATED.3IONS-SCNC: 12 MMOL/L (ref 4–13)
AST SERPL W P-5'-P-CCNC: 147 U/L (ref 5–45)
BASOPHILS # BLD AUTO: 0.03 THOUSANDS/ΜL (ref 0–0.1)
BASOPHILS NFR BLD AUTO: 1 % (ref 0–1)
BILIRUB SERPL-MCNC: 0.72 MG/DL (ref 0.2–1)
BUN SERPL-MCNC: 16 MG/DL (ref 5–25)
CALCIUM SERPL-MCNC: 9.2 MG/DL (ref 8.3–10.1)
CHLORIDE SERPL-SCNC: 100 MMOL/L (ref 100–108)
CO2 SERPL-SCNC: 25 MMOL/L (ref 21–32)
CREAT SERPL-MCNC: 0.8 MG/DL (ref 0.6–1.3)
EOSINOPHIL # BLD AUTO: 0.06 THOUSAND/ΜL (ref 0–0.61)
EOSINOPHIL NFR BLD AUTO: 1 % (ref 0–6)
ERYTHROCYTE [DISTWIDTH] IN BLOOD BY AUTOMATED COUNT: 12.6 % (ref 11.6–15.1)
GFR SERPL CREATININE-BSD FRML MDRD: 106 ML/MIN/1.73SQ M
GLUCOSE SERPL-MCNC: 151 MG/DL (ref 65–140)
HCT VFR BLD AUTO: 40.7 % (ref 36.5–49.3)
HGB BLD-MCNC: 14.2 G/DL (ref 12–17)
IMM GRANULOCYTES # BLD AUTO: 0.02 THOUSAND/UL (ref 0–0.2)
IMM GRANULOCYTES NFR BLD AUTO: 0 % (ref 0–2)
LIPASE SERPL-CCNC: 93 U/L (ref 73–393)
LYMPHOCYTES # BLD AUTO: 1.49 THOUSANDS/ΜL (ref 0.6–4.47)
LYMPHOCYTES NFR BLD AUTO: 28 % (ref 14–44)
MCH RBC QN AUTO: 32.6 PG (ref 26.8–34.3)
MCHC RBC AUTO-ENTMCNC: 34.9 G/DL (ref 31.4–37.4)
MCV RBC AUTO: 94 FL (ref 82–98)
MONOCYTES # BLD AUTO: 0.44 THOUSAND/ΜL (ref 0.17–1.22)
MONOCYTES NFR BLD AUTO: 8 % (ref 4–12)
NEUTROPHILS # BLD AUTO: 3.32 THOUSANDS/ΜL (ref 1.85–7.62)
NEUTS SEG NFR BLD AUTO: 62 % (ref 43–75)
NRBC BLD AUTO-RTO: 0 /100 WBCS
PLATELET # BLD AUTO: 129 THOUSANDS/UL (ref 149–390)
PMV BLD AUTO: 10.4 FL (ref 8.9–12.7)
POTASSIUM SERPL-SCNC: 5.1 MMOL/L (ref 3.5–5.3)
PROT SERPL-MCNC: 7.5 G/DL (ref 6.4–8.2)
RBC # BLD AUTO: 4.35 MILLION/UL (ref 3.88–5.62)
SODIUM SERPL-SCNC: 137 MMOL/L (ref 136–145)
TRIGL SERPL-MCNC: 1365 MG/DL
WBC # BLD AUTO: 5.36 THOUSAND/UL (ref 4.31–10.16)

## 2021-08-27 PROCEDURE — 85025 COMPLETE CBC W/AUTO DIFF WBC: CPT | Performed by: EMERGENCY MEDICINE

## 2021-08-27 PROCEDURE — 36415 COLL VENOUS BLD VENIPUNCTURE: CPT | Performed by: EMERGENCY MEDICINE

## 2021-08-27 PROCEDURE — 80053 COMPREHEN METABOLIC PANEL: CPT | Performed by: EMERGENCY MEDICINE

## 2021-08-27 PROCEDURE — 99284 EMERGENCY DEPT VISIT MOD MDM: CPT | Performed by: EMERGENCY MEDICINE

## 2021-08-27 PROCEDURE — 84478 ASSAY OF TRIGLYCERIDES: CPT | Performed by: EMERGENCY MEDICINE

## 2021-08-27 PROCEDURE — 83690 ASSAY OF LIPASE: CPT | Performed by: EMERGENCY MEDICINE

## 2021-08-27 PROCEDURE — 99283 EMERGENCY DEPT VISIT LOW MDM: CPT

## 2021-08-27 NOTE — TELEPHONE ENCOUNTER
Dinora-  Patient is taking your advice and heading to the ER  Jamir Oscar He would like you to notify the ER that he is coming      [phone]

## 2021-08-31 ENCOUNTER — TELEPHONE (OUTPATIENT)
Dept: RADIOLOGY | Facility: CLINIC | Age: 46
End: 2021-08-31

## 2021-08-31 DIAGNOSIS — I10 ESSENTIAL HYPERTENSION: ICD-10-CM

## 2021-08-31 RX ORDER — AMLODIPINE BESYLATE 5 MG/1
TABLET ORAL
Qty: 30 TABLET | Refills: 0 | Status: SHIPPED | OUTPATIENT
Start: 2021-08-31 | End: 2021-09-29

## 2021-08-31 RX ORDER — LISINOPRIL 20 MG/1
TABLET ORAL
Qty: 30 TABLET | Refills: 0 | Status: SHIPPED | OUTPATIENT
Start: 2021-08-31 | End: 2021-09-29

## 2021-08-31 NOTE — TELEPHONE ENCOUNTER
S/P Right C3-C6 MBB #1  Pain diary received  Pt reports only one half hour of relief at start of trial   Less than 80% relief throughout rest of trial    Please advise next step    **Discussed Lumbar MRI results**

## 2021-08-31 NOTE — TELEPHONE ENCOUNTER
S/W pt and advised of below Lumbar MRI results  Pt states wife is dropping Pain Diary off today  Will await those results

## 2021-08-31 NOTE — TELEPHONE ENCOUNTER
----- Message from Fely Carney MD sent at 8/30/2021  4:25 PM EDT -----  MRI shows only mild degenerative changes with small disc protrusions, but no notable pinching of the nerves  We are currently working on his neck, would move forward with cervical MBB 2  If he had notable relief with first one

## 2021-09-01 ENCOUNTER — TELEPHONE (OUTPATIENT)
Dept: GASTROENTEROLOGY | Facility: CLINIC | Age: 46
End: 2021-09-01

## 2021-09-01 NOTE — TELEPHONE ENCOUNTER
The insulin increase should help in terms of lowering his triglycerides as well  I would recommend he call the endocrinologist back to see if they have any recommendations on increasing his insulin further or changing his medications in the meantime before his appt as this would be the best treatment for lowering his triglycerides

## 2021-09-01 NOTE — TELEPHONE ENCOUNTER
----- Message from Taj Sutton sent at 8/31/2021  6:41 PM EDT -----  Regarding: Test Results Question  Contact: [phone]  I was contacting you in regards to my latest visit to the [facility]  As I did not receive any treatment, I do not know what to do now  I called the Endocrinologist and can not get an appointment any sooner than Sept 20th  I have had a few pains here and there that were pretty good ones but nothing constant  My blood sugar has been high and I have increased my insulin,  and that has not helped  Any input or suggestions would be greatly appreciated

## 2021-09-01 NOTE — TELEPHONE ENCOUNTER
----- Message from Jose Simon sent at 8/31/2021  6:41 PM EDT -----  Regarding: Test Results Question  Contact: [phone]  I was contacting you in regards to my latest visit to the [facility]  As I did not receive any treatment, I do not know what to do now  I called the Endocrinologist and can not get an appointment any sooner than Sept 20th  I have had a few pains here and there that were pretty good ones but nothing constant  My blood sugar has been high and I have increased my insulin,  and that has not helped  Any input or suggestions would be greatly appreciated

## 2021-09-01 NOTE — TELEPHONE ENCOUNTER
Called pt and advised  Gilbert Lopez he called the endorinologist and when speaking to the  he said he got no where     Routing back to PA as a [employer]

## 2021-09-02 NOTE — TELEPHONE ENCOUNTER
Patient called back  Gave him the information from richard  Patient had no further questions or concerns

## 2021-09-07 DIAGNOSIS — E78.2 MIXED HYPERLIPIDEMIA: ICD-10-CM

## 2021-09-07 DIAGNOSIS — G47.00 INSOMNIA, UNSPECIFIED TYPE: ICD-10-CM

## 2021-09-07 RX ORDER — OMEGA-3-ACID ETHYL ESTERS 1 G/1
CAPSULE, LIQUID FILLED ORAL
Qty: 360 CAPSULE | Refills: 0 | OUTPATIENT
Start: 2021-09-07 | End: 2021-09-14

## 2021-09-07 RX ORDER — TRAZODONE HYDROCHLORIDE 50 MG/1
TABLET ORAL
Qty: 30 TABLET | Refills: 0 | Status: SHIPPED | OUTPATIENT
Start: 2021-09-07 | End: 2021-10-09

## 2021-09-08 NOTE — TELEPHONE ENCOUNTER
Would consider repeating to see if we can get more extended relief given degree of degeneration in the neck

## 2021-09-09 NOTE — TELEPHONE ENCOUNTER
S/w pt to schedule Right C3-C6 MBB #2  Pt doesn't want another test procedure  I explained process that 2 test procedures are required before moving forward with RFA  Pt is in a lot of pain and wants quicker pain relief  Any other recommendations? Pt aware Dr Jimmy Darnell out of office until Monday

## 2021-09-12 ENCOUNTER — HOSPITAL ENCOUNTER (OUTPATIENT)
Facility: HOSPITAL | Age: 46
Setting detail: OBSERVATION
Discharge: HOME | End: 2021-09-14
Attending: EMERGENCY MEDICINE | Admitting: STUDENT IN AN ORGANIZED HEALTH CARE EDUCATION/TRAINING PROGRAM
Payer: COMMERCIAL

## 2021-09-12 ENCOUNTER — TELEPHONE (OUTPATIENT)
Dept: OTHER | Facility: OTHER | Age: 46
End: 2021-09-12

## 2021-09-12 ENCOUNTER — APPOINTMENT (EMERGENCY)
Dept: RADIOLOGY | Facility: HOSPITAL | Age: 46
End: 2021-09-12
Payer: COMMERCIAL

## 2021-09-12 DIAGNOSIS — K29.70 GASTRITIS: ICD-10-CM

## 2021-09-12 DIAGNOSIS — E78.1 HYPERTRIGLYCERIDEMIA: ICD-10-CM

## 2021-09-12 DIAGNOSIS — R10.9 ABDOMINAL PAIN: Primary | ICD-10-CM

## 2021-09-12 DIAGNOSIS — R10.13 ABDOMINAL PAIN, EPIGASTRIC: ICD-10-CM

## 2021-09-12 LAB
BASOPHILS # BLD AUTO: 0.04 THOUSANDS/ΜL (ref 0–0.1)
BASOPHILS NFR BLD AUTO: 1 % (ref 0–1)
EOSINOPHIL # BLD AUTO: 0.1 THOUSAND/ΜL (ref 0–0.61)
EOSINOPHIL NFR BLD AUTO: 2 % (ref 0–6)
ERYTHROCYTE [DISTWIDTH] IN BLOOD BY AUTOMATED COUNT: 12.8 % (ref 11.6–15.1)
HCT VFR BLD AUTO: 41.9 % (ref 36.5–49.3)
HGB BLD-MCNC: 14.3 G/DL (ref 12–17)
IMM GRANULOCYTES # BLD AUTO: 0.01 THOUSAND/UL (ref 0–0.2)
IMM GRANULOCYTES NFR BLD AUTO: 0 % (ref 0–2)
LYMPHOCYTES # BLD AUTO: 2.41 THOUSANDS/ΜL (ref 0.6–4.47)
LYMPHOCYTES NFR BLD AUTO: 36 % (ref 14–44)
MCH RBC QN AUTO: 32.5 PG (ref 26.8–34.3)
MCHC RBC AUTO-ENTMCNC: 34.1 G/DL (ref 31.4–37.4)
MCV RBC AUTO: 95 FL (ref 82–98)
MONOCYTES # BLD AUTO: 0.64 THOUSAND/ΜL (ref 0.17–1.22)
MONOCYTES NFR BLD AUTO: 10 % (ref 4–12)
NEUTROPHILS # BLD AUTO: 3.42 THOUSANDS/ΜL (ref 1.85–7.62)
NEUTS SEG NFR BLD AUTO: 51 % (ref 43–75)
NRBC BLD AUTO-RTO: 0 /100 WBCS
PLATELET # BLD AUTO: 167 THOUSANDS/UL (ref 149–390)
PMV BLD AUTO: 10.9 FL (ref 8.9–12.7)
RBC # BLD AUTO: 4.4 MILLION/UL (ref 3.88–5.62)
WBC # BLD AUTO: 6.62 THOUSAND/UL (ref 4.31–10.16)

## 2021-09-12 PROCEDURE — 96374 THER/PROPH/DIAG INJ IV PUSH: CPT

## 2021-09-12 PROCEDURE — 84484 ASSAY OF TROPONIN QUANT: CPT | Performed by: PHYSICIAN ASSISTANT

## 2021-09-12 PROCEDURE — 84478 ASSAY OF TRIGLYCERIDES: CPT | Performed by: PHYSICIAN ASSISTANT

## 2021-09-12 PROCEDURE — 80048 BASIC METABOLIC PNL TOTAL CA: CPT | Performed by: PHYSICIAN ASSISTANT

## 2021-09-12 PROCEDURE — 83690 ASSAY OF LIPASE: CPT | Performed by: PHYSICIAN ASSISTANT

## 2021-09-12 PROCEDURE — 99285 EMERGENCY DEPT VISIT HI MDM: CPT | Performed by: PHYSICIAN ASSISTANT

## 2021-09-12 PROCEDURE — 36415 COLL VENOUS BLD VENIPUNCTURE: CPT | Performed by: PHYSICIAN ASSISTANT

## 2021-09-12 PROCEDURE — 80076 HEPATIC FUNCTION PANEL: CPT | Performed by: PHYSICIAN ASSISTANT

## 2021-09-12 PROCEDURE — 85025 COMPLETE CBC W/AUTO DIFF WBC: CPT | Performed by: PHYSICIAN ASSISTANT

## 2021-09-12 PROCEDURE — 99285 EMERGENCY DEPT VISIT HI MDM: CPT

## 2021-09-12 PROCEDURE — 71046 X-RAY EXAM CHEST 2 VIEWS: CPT

## 2021-09-12 PROCEDURE — 96375 TX/PRO/DX INJ NEW DRUG ADDON: CPT

## 2021-09-12 PROCEDURE — 96361 HYDRATE IV INFUSION ADD-ON: CPT

## 2021-09-12 PROCEDURE — 83605 ASSAY OF LACTIC ACID: CPT | Performed by: PHYSICIAN ASSISTANT

## 2021-09-12 RX ORDER — HYDROMORPHONE HCL/PF 1 MG/ML
1 SYRINGE (ML) INJECTION ONCE
Status: COMPLETED | OUTPATIENT
Start: 2021-09-12 | End: 2021-09-12

## 2021-09-12 RX ORDER — ONDANSETRON 2 MG/ML
4 INJECTION INTRAMUSCULAR; INTRAVENOUS ONCE
Status: COMPLETED | OUTPATIENT
Start: 2021-09-12 | End: 2021-09-12

## 2021-09-12 RX ADMIN — ONDANSETRON 4 MG: 2 INJECTION INTRAMUSCULAR; INTRAVENOUS at 23:27

## 2021-09-12 RX ADMIN — HYDROMORPHONE HYDROCHLORIDE 1 MG: 1 INJECTION, SOLUTION INTRAMUSCULAR; INTRAVENOUS; SUBCUTANEOUS at 23:27

## 2021-09-12 RX ADMIN — SODIUM CHLORIDE 2000 ML: 0.9 INJECTION, SOLUTION INTRAVENOUS at 23:22

## 2021-09-13 ENCOUNTER — APPOINTMENT (EMERGENCY)
Dept: CT IMAGING | Facility: HOSPITAL | Age: 46
End: 2021-09-13
Payer: COMMERCIAL

## 2021-09-13 PROBLEM — E87.6 HYPOKALEMIA: Status: ACTIVE | Noted: 2021-09-13

## 2021-09-13 PROBLEM — R73.03 PREDIABETES: Status: ACTIVE | Noted: 2019-11-06

## 2021-09-13 LAB
ALBUMIN SERPL BCP-MCNC: 3.8 G/DL (ref 3.5–5)
ALP SERPL-CCNC: 115 U/L (ref 46–116)
ALT SERPL W P-5'-P-CCNC: 88 U/L (ref 12–78)
ANION GAP SERPL CALCULATED.3IONS-SCNC: 11 MMOL/L (ref 4–13)
ANION GAP SERPL CALCULATED.3IONS-SCNC: 15 MMOL/L (ref 4–13)
AST SERPL W P-5'-P-CCNC: 108 U/L (ref 5–45)
ATRIAL RATE: 70 BPM
BILIRUB DIRECT SERPL-MCNC: 0.29 MG/DL (ref 0–0.2)
BILIRUB SERPL-MCNC: 1.44 MG/DL (ref 0.2–1)
BILIRUB UR QL STRIP: NEGATIVE
BUN SERPL-MCNC: 10 MG/DL (ref 5–25)
BUN SERPL-MCNC: 15 MG/DL (ref 5–25)
CALCIUM SERPL-MCNC: 8.1 MG/DL (ref 8.3–10.1)
CALCIUM SERPL-MCNC: 8.5 MG/DL (ref 8.3–10.1)
CHLORIDE SERPL-SCNC: 100 MMOL/L (ref 100–108)
CHLORIDE SERPL-SCNC: 105 MMOL/L (ref 100–108)
CLARITY UR: CLEAR
CO2 SERPL-SCNC: 23 MMOL/L (ref 21–32)
CO2 SERPL-SCNC: 24 MMOL/L (ref 21–32)
COLOR UR: YELLOW
CREAT SERPL-MCNC: 0.77 MG/DL (ref 0.6–1.3)
CREAT SERPL-MCNC: 0.82 MG/DL (ref 0.6–1.3)
GFR SERPL CREATININE-BSD FRML MDRD: 105 ML/MIN/1.73SQ M
GFR SERPL CREATININE-BSD FRML MDRD: 107 ML/MIN/1.73SQ M
GLUCOSE SERPL-MCNC: 110 MG/DL (ref 65–140)
GLUCOSE SERPL-MCNC: 117 MG/DL (ref 65–140)
GLUCOSE SERPL-MCNC: 118 MG/DL (ref 65–140)
GLUCOSE SERPL-MCNC: 122 MG/DL (ref 65–140)
GLUCOSE SERPL-MCNC: 130 MG/DL (ref 65–140)
GLUCOSE SERPL-MCNC: 142 MG/DL (ref 65–140)
GLUCOSE UR STRIP-MCNC: NEGATIVE MG/DL
HGB UR QL STRIP.AUTO: NEGATIVE
KETONES UR STRIP-MCNC: NEGATIVE MG/DL
LACTATE SERPL-SCNC: 1.1 MMOL/L (ref 0.5–2)
LEUKOCYTE ESTERASE UR QL STRIP: NEGATIVE
LIPASE SERPL-CCNC: 79 U/L (ref 73–393)
NITRITE UR QL STRIP: NEGATIVE
P AXIS: 41 DEGREES
PH UR STRIP.AUTO: 5 [PH]
POTASSIUM SERPL-SCNC: 3.1 MMOL/L (ref 3.5–5.3)
POTASSIUM SERPL-SCNC: 3.9 MMOL/L (ref 3.5–5.3)
PR INTERVAL: 146 MS
PROT SERPL-MCNC: 7.1 G/DL (ref 6.4–8.2)
PROT UR STRIP-MCNC: NEGATIVE MG/DL
QRS AXIS: 47 DEGREES
QRSD INTERVAL: 114 MS
QT INTERVAL: 424 MS
QTC INTERVAL: 457 MS
SODIUM SERPL-SCNC: 138 MMOL/L (ref 136–145)
SODIUM SERPL-SCNC: 140 MMOL/L (ref 136–145)
SP GR UR STRIP.AUTO: 1.01 (ref 1–1.03)
T WAVE AXIS: 46 DEGREES
TRIGL SERPL-MCNC: 1116 MG/DL
TROPONIN I SERPL-MCNC: <0.02 NG/ML
UROBILINOGEN UR QL STRIP.AUTO: 0.2 E.U./DL
VENTRICULAR RATE: 70 BPM

## 2021-09-13 PROCEDURE — 80048 BASIC METABOLIC PNL TOTAL CA: CPT | Performed by: PHYSICIAN ASSISTANT

## 2021-09-13 PROCEDURE — 93010 ELECTROCARDIOGRAM REPORT: CPT | Performed by: INTERNAL MEDICINE

## 2021-09-13 PROCEDURE — 81003 URINALYSIS AUTO W/O SCOPE: CPT | Performed by: PHYSICIAN ASSISTANT

## 2021-09-13 PROCEDURE — 74177 CT ABD & PELVIS W/CONTRAST: CPT

## 2021-09-13 PROCEDURE — 93005 ELECTROCARDIOGRAM TRACING: CPT

## 2021-09-13 PROCEDURE — 96376 TX/PRO/DX INJ SAME DRUG ADON: CPT

## 2021-09-13 PROCEDURE — 82948 REAGENT STRIP/BLOOD GLUCOSE: CPT

## 2021-09-13 PROCEDURE — C9113 INJ PANTOPRAZOLE SODIUM, VIA: HCPCS | Performed by: PHYSICIAN ASSISTANT

## 2021-09-13 PROCEDURE — 96375 TX/PRO/DX INJ NEW DRUG ADDON: CPT

## 2021-09-13 PROCEDURE — 99220 PR INITIAL OBSERVATION CARE/DAY 70 MINUTES: CPT | Performed by: FAMILY MEDICINE

## 2021-09-13 PROCEDURE — 99219 PR INITIAL OBSERVATION CARE/DAY 50 MINUTES: CPT | Performed by: INTERNAL MEDICINE

## 2021-09-13 PROCEDURE — 36415 COLL VENOUS BLD VENIPUNCTURE: CPT | Performed by: PHYSICIAN ASSISTANT

## 2021-09-13 RX ORDER — INSULIN GLARGINE 100 [IU]/ML
27 INJECTION, SOLUTION SUBCUTANEOUS EVERY MORNING
Status: DISCONTINUED | OUTPATIENT
Start: 2021-09-13 | End: 2021-09-14 | Stop reason: HOSPADM

## 2021-09-13 RX ORDER — FENOFIBRATE 145 MG/1
145 TABLET, COATED ORAL DAILY
Status: DISCONTINUED | OUTPATIENT
Start: 2021-09-13 | End: 2021-09-14 | Stop reason: HOSPADM

## 2021-09-13 RX ORDER — FAMOTIDINE 20 MG/1
40 TABLET, FILM COATED ORAL 2 TIMES DAILY
Status: DISCONTINUED | OUTPATIENT
Start: 2021-09-13 | End: 2021-09-14 | Stop reason: HOSPADM

## 2021-09-13 RX ORDER — MAGNESIUM HYDROXIDE/ALUMINUM HYDROXICE/SIMETHICONE 120; 1200; 1200 MG/30ML; MG/30ML; MG/30ML
30 SUSPENSION ORAL EVERY 6 HOURS PRN
Status: DISCONTINUED | OUTPATIENT
Start: 2021-09-13 | End: 2021-09-14 | Stop reason: HOSPADM

## 2021-09-13 RX ORDER — PREGABALIN 100 MG/1
100 CAPSULE ORAL 2 TIMES DAILY
Status: DISCONTINUED | OUTPATIENT
Start: 2021-09-13 | End: 2021-09-14 | Stop reason: HOSPADM

## 2021-09-13 RX ORDER — TRAZODONE HYDROCHLORIDE 50 MG/1
50 TABLET ORAL
Status: DISCONTINUED | OUTPATIENT
Start: 2021-09-13 | End: 2021-09-14 | Stop reason: HOSPADM

## 2021-09-13 RX ORDER — POTASSIUM CHLORIDE 20MEQ/15ML
40 LIQUID (ML) ORAL ONCE
Status: COMPLETED | OUTPATIENT
Start: 2021-09-13 | End: 2021-09-13

## 2021-09-13 RX ORDER — OXYCODONE HYDROCHLORIDE 5 MG/1
5 TABLET ORAL EVERY 6 HOURS PRN
Status: DISCONTINUED | OUTPATIENT
Start: 2021-09-13 | End: 2021-09-14 | Stop reason: HOSPADM

## 2021-09-13 RX ORDER — POTASSIUM CHLORIDE 14.9 MG/ML
20 INJECTION INTRAVENOUS ONCE
Status: COMPLETED | OUTPATIENT
Start: 2021-09-13 | End: 2021-09-13

## 2021-09-13 RX ORDER — SACCHAROMYCES BOULARDII 250 MG
250 CAPSULE ORAL 2 TIMES DAILY
Status: DISCONTINUED | OUTPATIENT
Start: 2021-09-13 | End: 2021-09-14 | Stop reason: HOSPADM

## 2021-09-13 RX ORDER — ALBUTEROL SULFATE 90 UG/1
2 AEROSOL, METERED RESPIRATORY (INHALATION) EVERY 6 HOURS PRN
Status: DISCONTINUED | OUTPATIENT
Start: 2021-09-13 | End: 2021-09-14 | Stop reason: HOSPADM

## 2021-09-13 RX ORDER — PANTOPRAZOLE SODIUM 40 MG/1
40 INJECTION, POWDER, FOR SOLUTION INTRAVENOUS ONCE
Status: COMPLETED | OUTPATIENT
Start: 2021-09-13 | End: 2021-09-13

## 2021-09-13 RX ORDER — ONDANSETRON 2 MG/ML
4 INJECTION INTRAMUSCULAR; INTRAVENOUS EVERY 6 HOURS PRN
Status: DISCONTINUED | OUTPATIENT
Start: 2021-09-13 | End: 2021-09-14 | Stop reason: HOSPADM

## 2021-09-13 RX ORDER — ATORVASTATIN CALCIUM 40 MG/1
80 TABLET, FILM COATED ORAL
Status: DISCONTINUED | OUTPATIENT
Start: 2021-09-13 | End: 2021-09-14 | Stop reason: HOSPADM

## 2021-09-13 RX ORDER — OXYCODONE HYDROCHLORIDE 5 MG/1
2.5 TABLET ORAL EVERY 4 HOURS PRN
Status: COMPLETED | OUTPATIENT
Start: 2021-09-13 | End: 2021-09-13

## 2021-09-13 RX ORDER — HYDROMORPHONE HCL/PF 1 MG/ML
1 SYRINGE (ML) INJECTION ONCE
Status: COMPLETED | OUTPATIENT
Start: 2021-09-13 | End: 2021-09-13

## 2021-09-13 RX ORDER — LIDOCAINE HYDROCHLORIDE 20 MG/ML
15 SOLUTION OROPHARYNGEAL 4 TIMES DAILY PRN
Status: DISCONTINUED | OUTPATIENT
Start: 2021-09-13 | End: 2021-09-14 | Stop reason: HOSPADM

## 2021-09-13 RX ORDER — LISINOPRIL 10 MG/1
20 TABLET ORAL DAILY
Status: DISCONTINUED | OUTPATIENT
Start: 2021-09-13 | End: 2021-09-14 | Stop reason: HOSPADM

## 2021-09-13 RX ORDER — PANTOPRAZOLE SODIUM 40 MG/1
40 TABLET, DELAYED RELEASE ORAL
Status: DISCONTINUED | OUTPATIENT
Start: 2021-09-13 | End: 2021-09-14 | Stop reason: HOSPADM

## 2021-09-13 RX ORDER — SODIUM CHLORIDE, SODIUM LACTATE, POTASSIUM CHLORIDE, CALCIUM CHLORIDE 600; 310; 30; 20 MG/100ML; MG/100ML; MG/100ML; MG/100ML
75 INJECTION, SOLUTION INTRAVENOUS CONTINUOUS
Status: DISCONTINUED | OUTPATIENT
Start: 2021-09-13 | End: 2021-09-14 | Stop reason: HOSPADM

## 2021-09-13 RX ORDER — AMLODIPINE BESYLATE 5 MG/1
5 TABLET ORAL DAILY
Status: DISCONTINUED | OUTPATIENT
Start: 2021-09-13 | End: 2021-09-14 | Stop reason: HOSPADM

## 2021-09-13 RX ADMIN — PREGABALIN 100 MG: 100 CAPSULE ORAL at 08:58

## 2021-09-13 RX ADMIN — OXYCODONE HYDROCHLORIDE 5 MG: 5 TABLET ORAL at 09:56

## 2021-09-13 RX ADMIN — FENOFIBRATE 145 MG: 145 TABLET, FILM COATED ORAL at 08:59

## 2021-09-13 RX ADMIN — INSULIN GLARGINE 27 UNITS: 100 INJECTION, SOLUTION SUBCUTANEOUS at 08:59

## 2021-09-13 RX ADMIN — FAMOTIDINE 40 MG: 20 TABLET ORAL at 17:52

## 2021-09-13 RX ADMIN — PANCRELIPASE 36000 UNITS: 24000; 76000; 120000 CAPSULE, DELAYED RELEASE PELLETS ORAL at 15:57

## 2021-09-13 RX ADMIN — ONDANSETRON 4 MG: 2 INJECTION INTRAMUSCULAR; INTRAVENOUS at 22:18

## 2021-09-13 RX ADMIN — LISINOPRIL 20 MG: 10 TABLET ORAL at 09:08

## 2021-09-13 RX ADMIN — IOHEXOL 100 ML: 350 INJECTION, SOLUTION INTRAVENOUS at 00:51

## 2021-09-13 RX ADMIN — FAMOTIDINE 40 MG: 20 TABLET ORAL at 09:08

## 2021-09-13 RX ADMIN — PREGABALIN 100 MG: 100 CAPSULE ORAL at 17:52

## 2021-09-13 RX ADMIN — OXYCODONE HYDROCHLORIDE 2.5 MG: 5 TABLET ORAL at 05:55

## 2021-09-13 RX ADMIN — POTASSIUM CHLORIDE 20 MEQ: 200 INJECTION, SOLUTION INTRAVENOUS at 06:08

## 2021-09-13 RX ADMIN — OXYCODONE HYDROCHLORIDE 5 MG: 5 TABLET ORAL at 15:56

## 2021-09-13 RX ADMIN — HYDROMORPHONE HYDROCHLORIDE 1 MG: 1 INJECTION, SOLUTION INTRAMUSCULAR; INTRAVENOUS; SUBCUTANEOUS at 03:04

## 2021-09-13 RX ADMIN — POTASSIUM CHLORIDE 40 MEQ: 20 SOLUTION ORAL at 05:56

## 2021-09-13 RX ADMIN — PANTOPRAZOLE SODIUM 40 MG: 40 INJECTION, POWDER, FOR SOLUTION INTRAVENOUS at 03:04

## 2021-09-13 RX ADMIN — OXYCODONE HYDROCHLORIDE 5 MG: 5 TABLET ORAL at 22:18

## 2021-09-13 RX ADMIN — MORPHINE SULFATE 2 MG: 2 INJECTION, SOLUTION INTRAMUSCULAR; INTRAVENOUS at 20:35

## 2021-09-13 RX ADMIN — SODIUM CHLORIDE, SODIUM LACTATE, POTASSIUM CHLORIDE, AND CALCIUM CHLORIDE 75 ML/HR: .6; .31; .03; .02 INJECTION, SOLUTION INTRAVENOUS at 06:07

## 2021-09-13 RX ADMIN — Medication 250 MG: at 09:01

## 2021-09-13 RX ADMIN — Medication 250 MG: at 17:52

## 2021-09-13 RX ADMIN — PANCRELIPASE 36000 UNITS: 24000; 76000; 120000 CAPSULE, DELAYED RELEASE PELLETS ORAL at 09:09

## 2021-09-13 RX ADMIN — PANTOPRAZOLE SODIUM 40 MG: 40 TABLET, DELAYED RELEASE ORAL at 07:30

## 2021-09-13 RX ADMIN — TRAZODONE HYDROCHLORIDE 50 MG: 50 TABLET ORAL at 22:18

## 2021-09-13 RX ADMIN — PANTOPRAZOLE SODIUM 40 MG: 40 TABLET, DELAYED RELEASE ORAL at 15:56

## 2021-09-13 RX ADMIN — ATORVASTATIN CALCIUM 80 MG: 40 TABLET, FILM COATED ORAL at 15:56

## 2021-09-13 RX ADMIN — AMLODIPINE BESYLATE 5 MG: 5 TABLET ORAL at 08:58

## 2021-09-13 NOTE — H&P
[address] [facility]  H&P- Quoc Whiting 1975, 48 y o  male MRN: [id number]  Unit/Bed#: ED 10 Encounter: [id number]  Primary Care Provider: LUANA Pérez   Date and time admitted to hospital: 9/12/2021 10:00 PM    * Hypertriglyceridemia  Assessment & Plan  · Triglycerides at 1116 with abdominal pain  · Appreciate GI consult  · Continue home long-acting insulin 27 units q d , statin, fenofibrate  · P r n  GI cocktail, heating pad, Zofran for pain, 1 time dose oxycodone, but will attempt to avoid opiates as much as possible so as not to exacerbate abdominal pain  · Sliding scale insulin with blood glucose checks q i d  Prediabetes  Assessment & Plan  · Continue outpatient follow-up with endocrinology  · Will continue home long-acting insulin 27 units q d  · Sliding scale insulin  · Blood glucose checks q i d , hypoglycemia protocol    Hypokalemia  Assessment & Plan  · Decreased at 3 1  · Will give 1 time dose IV potassium chloride 20 mEq and 1 time dose p o  Potassium chloride 40 mEq  · Recheck BMP    Acute gastritis  Assessment & Plan  · CT abdomen pelvis revealing diffuse gastric wall thickening related to gastritis, stable hepatomegaly  · Continue IV fluid hydration  · Low-fat diet  · GI consult      VTE Pharmacologic Prophylaxis: VTE Score: 2 Low Risk (Score 0-2) - Encourage Ambulation  Code Status: Level 1 - Full Code per pt      Anticipated Length of Stay: Patient will be admitted on an observation basis with an anticipated length of stay of less than 2 midnights secondary to See above  Total Time for Visit, including Counseling / Coordination of Care: 70 minutes Greater than 50% of this total time spent on direct patient counseling and coordination of care      Chief Complaint:    Chief Complaint   Patient presents with    Abdominal Pain     pt c/o right sided abdominal pain that tsrated this morning, +n/v/d       History of Present Illness:  Quoc Whiting is a 48 y o  male with a PMH of familial hypertriglyceridemia, chronic pancreatitis, chronic gastritis, pre diabetes who presents with complaint of sudden onset of epigastric pain and right upper quadrant pain x1 week, gradually worse over the last day  Reports to vomiting yesterday but nothing today  Denies nausea currently  Reports he feels this way when his triglycerides start to creep up again     Denies daily alcohol use or binge drinking  Reports he had 2 beers while at a  this past week  Review of Systems:  Review of Systems   Constitutional: Negative for activity change  Cardiovascular: Negative for chest pain  Gastrointestinal: Positive for abdominal pain and vomiting  Negative for nausea  Neurological: Negative for dizziness and headaches  Past Medical and Surgical History:   Past Medical History:   Diagnosis Date    Asthma     Chronic pain disorder     Diabetes mellitus ([facility] [address] )     GERD (gastroesophageal reflux disease)     Hyperlipidemia     Liver abscess     Meniscus tear     left knee work injury last assessed 2016    Pancreatitis     Pneumonia        Past Surgical History:   Procedure Laterality Date    CELIAC PLEXUS BLOCK Bilateral 10/29/2018    Procedure: SPLANCHNIC NERVE BLOCK;  Surgeon: Nimo Vargas MD;  [facility];  Service: Pain Management     CELIAC PLEXUS BLOCK Bilateral 1/10/2019    Procedure: SPLANCHNIC NERVE BLOCK;  Surgeon: Nimo Vargas MD;  [facility];  Service: Pain Management     CHOLECYSTECTOMY      ESOPHAGOGASTRODUODENOSCOPY N/A 2017    Procedure: ESOPHAGOGASTRODUODENOSCOPY (EGD); Surgeon: Radha Marcial MD;  [facility]; Service: Gastroenterology    ESOPHAGOGASTRODUODENOSCOPY N/A 2018    Procedure: ESOPHAGOGASTRODUODENOSCOPY (EGD); Surgeon: Yonathan Espino MD;  [facility]; Service: Gastroenterology    ESOPHAGOGASTRODUODENOSCOPY N/A 5/10/2018    Procedure: ESOPHAGOGASTRODUODENOSCOPY (EGD);   Surgeon: Zeferino Chen MD;  [facility];   Service: Gastroenterology    IR TEMPORARY DIALYSIS CATHETER PLACEMENT  2/28/2019    KNEE ARTHROSCOPY Bilateral     KNEE ARTHROSCOPY Right 2009    cibishino last assessed 08/24/2016    KNEE ARTHROSCOPY Right 07/01/2019    LIVER SURGERY      NERVE BLOCK Bilateral 5/29/2018    Procedure: SPLANCHNIC NERVE BLOCK;  Surgeon: Renzo Parker MD;  [facility];  Service: Pain Management     PANCREAS SURGERY      stents    PANCREATIC CYST EXCISION      AL INJECT NERV BLCK,CELIAC PLEXUS Bilateral 5/9/2017    Procedure: CELIAC PLEXUS BLOCK ;  Surgeon: Renzo Parker MD;  [facility];  Service: Pain Management     AL INJECT NERV BLCK,CELIAC PLEXUS Bilateral 6/1/2017    Procedure: SPLANCHNIC NERVE BLOCK at T12;  Surgeon: Renzo Parker MD;  [facility];  Service: Pain Management     AL INJECT NERV BLCK,CELIAC PLEXUS Bilateral 8/8/2017    Procedure: BILATERAL SPLANCHNIC NERVE BLOCK T12;  Surgeon: Renzo Parker MD;  [facility];  Service: Pain Management     AL INJECT NERV BLCK,CELIAC PLEXUS Bilateral 4/18/2019    Procedure: BLOCK / INJECTION CELIAC PLEXUS;  Surgeon: Renzo Parker MD;  [facility];  Service: Pain Management     AL INJECT NERV BLCK,CELIAC PLEXUS Bilateral 8/20/2019    Procedure: SPLANCHNIC NERVE BLOCK;  Surgeon: Renzo Parker MD;  [facility];  Service: Pain Management     AL INJECT NERV BLCK,CELIAC PLEXUS Bilateral 10/17/2019    Procedure: SPLANCHNIC NERVE BLOCK;  Surgeon: Renzo Parker MD;  [facility];  Service: Pain Management     AL INJECT NERV Clovia Bilis Bilateral 12/28/2017    Procedure: SPLANCHNIC NERVE BLOCK;  Surgeon: Renzo Parker MD;  [facility];  Service: Pain Management     AL LAP,CHOLECYSTECTOMY N/A 2/14/2017    Procedure: LAPAROSCOPIC CHOLECYSTECTOMY, IOC, POSSIBLE OPEN ;  Surgeon: Jewel Espinal MD;  [facility];  Service: General    ROTATOR CUFF REPAIR Right     SHOULDER ARTHROSCOPY      SHOULDER ARTHROSCOPY Right        Meds/Allergies:  Prior to Admission medications    Medication Sig Start Date End Date Taking?  Authorizing Provider   albuterol (PROVENTIL HFA,VENTOLIN HFA) 90 mcg/act inhaler INHALE TWO PUFFS BY MOUTH EVERY 6 HOURS AS NEEDED FOR WHEEZING 2/7/21   LUANA Wan   amLODIPine (NORVASC) 5 mg tablet TAKE ONE TABLET BY MOUTH EVERY DAY  8/31/21   LUANA Wan   Choline Fenofibrate (Fenofibric Acid) 135 MG CPDR Take 1 capsule (135 mg total) by mouth daily 2/15/21   LUANA Wan   diclofenac sodium (VOLTAREN) 1 % diclofenac 1 % topical gel   APPLY 2 GRAMS TO THE AFFECTED AREA(S) BY TOPICAL ROUTE 4 TIMES PER DAY  Patient not taking: Reported on 8/5/2021    Historical Provider, MD   dicyclomine (BENTYL) 20 mg tablet Take 1 tablet (20 mg total) by mouth 2 (two) times a day  Patient not taking: Reported on 8/5/2021 3/13/21   Beronica Singleton MD   EPIPEN 2-ARIEL 0 3 MG/0 3ML SOAJ Inject one syringe (0 3mg) intramuscularly once as directed 6/23/20   LUANA Wan   esomeprazole (NexIUM) 40 MG capsule TAKE ONE CAPSULE BY MOUTH TWICE DAILY BEFORE MEALS  8/18/21   Dinora Mensah PA-C   famotidine (PEPCID) 40 MG tablet Take 40 mg by mouth 2 (two) times a day    Historical Provider, MD   insulin degludec Susanna Goes FlexTouch) 100 units/mL injection pen 20 units subcut in hs 6/22/20   Historical Provider, MD   Lidocaine-Menthol 4-1 % GEL NuLido 4 %-1 % topical gel   Apply to affected area 2-4 times a day as needed for pain  Patient not taking: Reported on 8/5/2021    Historical Provider, MD   lisinopril (ZESTRIL) 20 mg tablet TAKE ONE TABLET BY MOUTH EVERY DAY  8/31/21   LUANA Wan   methylPREDNISolone 4 MG tablet therapy pack Use as directed on package 8/5/21   Annamaria Truong MD   omega-3-acid ethyl esters (LOVAZA) 1 g capsule TAKE TWO CAPSULES BY MOUTH TWICE DAILY  9/7/21   Georgena Staff, CRNP   omeprazole (PriLOSEC) 20 mg delayed release capsule Take 20 mg by mouth daily    Historical Provider, MD   ondansetron (ZOFRAN-ODT) 4 mg disintegrating tablet DISSOLVE ONE TABLET IN MOUTH EVERY EIGHT HOURS AS NEEDED NAUSEA OR VOMITING  21   Karolnetrevor AidLUANA   oxyCODONE-acetaminophen (PERCOCET) 5-325 mg per tablet Take 1 tablet by mouth every 6 (six) hours as needed for moderate painMax Daily Amount: 4 tablets  Patient not taking: Reported on 2021   LUANA Zuniga   pancrelipase, Lip-Prot-Amyl, (Creon) 12,000 units capsule Take 12,000 units of lipase by mouth as needed for snacks 20   Collette Kudo, PA-C   Pancrelipase, Lip-Prot-Amyl, (Creon) 06725 units CPEP Take 1 capsule (36,000 Units total) by mouth 3 (three) times a day before meals 20   Collette Kudo, PA-C   pregabalin (LYRICA) 100 mg capsule TAKE ONE CAPSULE BY MOUTH TWICE DAILY  21   Verneita AidLUANA   rosuvastatin (CRESTOR) 40 MG tablet TAKE ONE TABLET BY MOUTH ONCE A DAY  21   Verneita AidLUANA   traZODone (DESYREL) 50 mg tablet TAKE ONE TABLET BY MOUTH NIGHTLY AT BEDTIME  21   Verneita AidLUANA     I have reviewed home medications per review of chart and PDMP  Allergies:    Allergies   Allergen Reactions    Bee Venom Swelling       Social History:  Marital Status: /Civil Union     Patient Pre-hospital Living Situation: Home  Patient Pre-hospital Level of Mobility: walks  Patient Pre-hospital Diet Restrictions:  Low-fat  Substance Use History:   Social History     Substance and Sexual Activity   Alcohol Use Not Currently    Alcohol/week: 10 0 standard drinks    Types: 10 Cans of beer per week    Comment: quit     Social History     Tobacco Use   Smoking Status Former Smoker    Packs/day:     Years:     Pack years:     Quit date: 3/15/2021    Years since quittin 4   Smokeless Tobacco Never Used     Social History     Substance and Sexual Activity   Drug Use No       Family History:  Family History   Problem Relation Age of Onset    Cirrhosis Mother     Heart disease Other         cardiac disorder    Cancer Other        Physical Exam:     Vitals:   Blood Pressure: 139/78 (09/13/21 0430)  Pulse: 66 (09/13/21 0430)  Temperature: 98 °F (36 7 °C) (09/12/21 2131)  Temp Source: Oral (09/12/21 2131)  Respirations: 19 (09/13/21 0430)  Height: 5' 11" (180 3 cm) (09/12/21 2131)  Weight - Scale: 90 7 kg (200 lb) (09/12/21 2131)  SpO2: 95 % (09/13/21 0430)    Physical Exam  Vitals and nursing note reviewed  Constitutional:       General: He is not in acute distress  Appearance: Normal appearance  He is not diaphoretic  HENT:      Head: Normocephalic  Cardiovascular:      Rate and Rhythm: Normal rate and regular rhythm  Pulses: Normal pulses  Heart sounds: Normal heart sounds  Pulmonary:      Effort: Pulmonary effort is normal       Breath sounds: Normal breath sounds  Abdominal:      General: Abdomen is flat  Bowel sounds are normal       Palpations: Abdomen is soft  Tenderness: There is abdominal tenderness in the right upper quadrant and epigastric area  There is no guarding  Musculoskeletal:         General: No tenderness  Right lower leg: No edema  Left lower leg: No edema  Skin:     General: Skin is warm and dry  Neurological:      General: No focal deficit present  Mental Status: He is alert and oriented to person, place, and time  Psychiatric:         Mood and Affect: Mood normal          Behavior: Behavior normal          Thought Content:  Thought content normal          Judgment: Judgment normal          Additional Data:     Lab Results:  Results from last 7 days   Lab Units 09/12/21  2319   WBC Thousand/uL 6 62   HEMOGLOBIN g/dL 14 3   HEMATOCRIT % 41 9   PLATELETS Thousands/uL 167   NEUTROS PCT % 51   LYMPHS PCT % 36   MONOS PCT % 10   EOS PCT % 2     Results from last 7 days   Lab Units 09/12/21  2320   SODIUM mmol/L 138   POTASSIUM mmol/L 3 1* CHLORIDE mmol/L 100   CO2 mmol/L 23   BUN mg/dL 15   CREATININE mg/dL 0 82   ANION GAP mmol/L 15*   CALCIUM mg/dL 8 5   ALBUMIN g/dL 3 8   TOTAL BILIRUBIN mg/dL 1 44*   ALK PHOS U/L 115   ALT U/L 88*   AST U/L 108*   GLUCOSE RANDOM mg/dL 142*                 Results from last 7 days   Lab Units 09/12/21  2319   LACTIC ACID mmol/L 1 1       Imaging: Reviewed radiology reports from this admission including: abdominal/pelvic CT  CT abdomen pelvis with contrast   Final Result by Damion Plata MD (09/13 2298)      1  Redemonstrated diffuse gastric wall thickening which may be due to gastritis  2   Stable hepatomegaly  Workstation performed: [id number]         XR chest pa & lateral   ED Interpretation by Kary Gutierrez PA-C (09/13 0633)   No acute cardiopulmonary process          EKG and Other Studies Reviewed on Admission:   · EKG: No EKG obtained  ** Please Note: This note has been constructed using a voice recognition system   **

## 2021-09-13 NOTE — TELEPHONE ENCOUNTER
Patients wife is calling stating that she wanted the provider to know that the patient is in the emergency room again, had already gone through triage  She  stated that the patient had been having severe abdominal pain, and has been vomiting all day  She stated that his triglycerides were over 3,000

## 2021-09-13 NOTE — PLAN OF CARE
Problem: Potential for Falls  Goal: Patient will remain free of falls  Description: INTERVENTIONS:  - Educate patient/family on patient safety including physical limitations  - Instruct patient to call for assistance with activity   - Consult OT/PT to assist with strengthening/mobility   - Keep Call bell within reach  - Keep bed low and locked with side rails adjusted as appropriate  - Keep care items and personal belongings within reach  - Initiate and maintain comfort rounds  - Make Fall Risk Sign visible to staff  - Apply yellow socks and bracelet for high fall risk patients  - Consider moving patient to room near nurses station  9/13/2021 1325 by Kalani Jay RN  Outcome: Progressing  9/13/2021 1324 by Kalani Jay RN  Outcome: Progressing     Problem: PAIN - ADULT  Goal: Verbalizes/displays adequate comfort level or baseline comfort level  Description: Interventions:  - Encourage patient to monitor pain and request assistance  - Assess pain using appropriate pain scale  - Administer analgesics based on type and severity of pain and evaluate response  - Implement non-pharmacological measures as appropriate and evaluate response  - Consider cultural and social influences on pain and pain management  - Notify physician/advanced practitioner if interventions unsuccessful or patient reports new pain  Outcome: Progressing     Problem: INFECTION - ADULT  Goal: Absence or prevention of progression during hospitalization  Description: INTERVENTIONS:  - Assess and monitor for signs and symptoms of infection  - Monitor lab/diagnostic results  - Monitor all insertion sites, i e  indwelling lines, tubes, and drains  - Monitor endotracheal if appropriate and nasal secretions for changes in amount and color  - [place] appropriate cooling/warming therapies per order  - Administer medications as ordered  - Instruct and encourage patient and family to use good hand hygiene technique  - Identify and instruct in appropriate isolation precautions for identified infection/condition  Outcome: Progressing  Goal: Absence of fever/infection during neutropenic period  Description: INTERVENTIONS:  - Monitor WBC    Outcome: Progressing     Problem: DISCHARGE PLANNING  Goal: Discharge to home or other facility with appropriate resources  Description: INTERVENTIONS:  - Identify barriers to discharge w/patient and caregiver  - Arrange for needed discharge resources and transportation as appropriate  - Identify discharge learning needs (meds, wound care, etc )  - Arrange for interpretive services to assist at discharge as needed  - Refer to Case Management Department for coordinating discharge planning if the patient needs post-hospital services based on physician/advanced practitioner order or complex needs related to functional status, cognitive ability, or social support system  Outcome: Progressing     Problem: Knowledge Deficit  Goal: Patient/family/caregiver demonstrates understanding of disease process, treatment plan, medications, and discharge instructions  Description: Complete learning assessment and assess knowledge base    Interventions:  - Provide teaching at level of understanding  - Provide teaching via preferred learning methods  Outcome: Progressing

## 2021-09-13 NOTE — ASSESSMENT & PLAN NOTE
· Triglycerides at 1116 with abdominal pain  · Appreciate GI consult  · Continue home long-acting insulin 27 units q d , statin, fenofibrate  · P r n  GI cocktail, heating pad, Zofran for pain, 1 time dose oxycodone, but will attempt to avoid opiates as much as possible so as not to exacerbate abdominal pain  · Sliding scale insulin with blood glucose checks q i d

## 2021-09-13 NOTE — CONSULTS
Consultation - [facility]) [facility]  Craig Oden 48 y o  male MRN: [id number]  Unit/Bed#: ED 10 Encounter: [id number]      Inpatient consult to gastroenterology  Consult performed by: Adriana Pappas PA-C  Consult ordered by: Iona Chrery PA-C          Reason for Consult / Principal Problem:  Hypertriglyceridemia    HPI: Craig Oden is a 48y o  year old male who presents with past medical history significant for hypertriglyceridemia, diabetes, chronic pancreatitis, and fatty liver  Patient presents the [facility] Emergency Department yesterday with chief complaint of abdominal pain  Patient reports that around 330 in the morning on Sunday morning he woke up with severe abdominal discomfort with associated nausea vomiting and episode of diarrhea  Patient does suffer from hypertriglyceridemia  Patient's triglyceride level yesterday in the emergency department was 1116 which is actually down from previous  Patient reports that he did drink 2 beers on Saturday over a 4 hour period  Patient is not heavy drinker  Patient's CT scan from admission shows fatty liver disease as well as gastric wall thickening which is a chronic finding  Patient did undergo an endoscopic ultrasound regarding this finding in 2020  Patient reports that he did a regular breakfast today  Does report that his pain is improved since admission  Patient reports he has not had a break in any of his home meds  REVIEW OF SYSTEMS:     CONSTITUTIONAL: Denies any fever, chills, or rigors  Good appetite, and no recent weight loss  HEENT: No earache or tinnitus  Denies hearing loss or visual disturbances  CARDIOVASCULAR: No chest pain or palpitations  RESPIRATORY: Denies any cough, hemoptysis, shortness of breath or dyspnea on exertion  GASTROINTESTINAL: As noted in the History of Present Illness  GENITOURINARY: No problems with urination  Denies any hematuria or dysuria    NEUROLOGIC: No dizziness or vertigo, denies headaches  MUSCULOSKELETAL: Denies any muscle or joint pain  SKIN: Denies skin rashes or itching  ENDOCRINE: Denies excessive thirst  Denies intolerance to heat or cold  PSYCHOSOCIAL: Denies depression or anxiety  Denies any recent memory loss  Historical Information   Past Medical History:   Diagnosis Date    Asthma     Chronic pain disorder     Diabetes mellitus ([facility] [address] )     GERD (gastroesophageal reflux disease)     Hyperlipidemia     Liver abscess     Meniscus tear     left knee work injury last assessed 08/24/2016    Pancreatitis     Pneumonia      Past Surgical History:   Procedure Laterality Date    CELIAC PLEXUS BLOCK Bilateral 10/29/2018    Procedure: SPLANCHNIC NERVE BLOCK;  Surgeon: Lizzette Castro MD;  [facility];  Service: Pain Management     CELIAC PLEXUS BLOCK Bilateral 1/10/2019    Procedure: SPLANCHNIC NERVE BLOCK;  Surgeon: Lizzette Castro MD;  [facility];  Service: Pain Management     CHOLECYSTECTOMY      ESOPHAGOGASTRODUODENOSCOPY N/A 11/16/2017    Procedure: ESOPHAGOGASTRODUODENOSCOPY (EGD); Surgeon: Michael Rincon MD;  [facility]; Service: Gastroenterology    ESOPHAGOGASTRODUODENOSCOPY N/A 4/19/2018    Procedure: ESOPHAGOGASTRODUODENOSCOPY (EGD); Surgeon: Tod Longo MD;  [facility]; Service: Gastroenterology    ESOPHAGOGASTRODUODENOSCOPY N/A 5/10/2018    Procedure: ESOPHAGOGASTRODUODENOSCOPY (EGD); Surgeon: Sonia Phillip MD;  [facility];   Service: Gastroenterology    IR TEMPORARY DIALYSIS CATHETER PLACEMENT  2/28/2019    KNEE ARTHROSCOPY Bilateral     KNEE ARTHROSCOPY Right 2009    cibishino last assessed 08/24/2016    KNEE ARTHROSCOPY Right 07/01/2019    LIVER SURGERY      NERVE BLOCK Bilateral 5/29/2018    Procedure: SPLANCHNIC NERVE BLOCK;  Surgeon: Lizzette Castro MD;  [facility];  Service: Pain Management     PANCREAS SURGERY      stents    PANCREATIC CYST EXCISION      [place] INJECT NERV BLCK,CELIAC PLEXUS Bilateral 2017    Procedure: CELIAC PLEXUS BLOCK ;  Surgeon: Lorne Fierro MD;  [facility];  Service: Pain Management     NJ INJECT NERV BLCK,CELIAC PLEXUS Bilateral 2017    Procedure: SPLANCHNIC NERVE BLOCK at T12;  Surgeon: Lorne Fierro MD;  [facility];  Service: Pain Management     NJ INJECT NERV BLCK,CELIAC PLEXUS Bilateral 2017    Procedure: BILATERAL SPLANCHNIC NERVE BLOCK T12;  Surgeon: Lorne Fierro MD;  [facility];  Service: Pain Management     NJ INJECT NERV BLCK,CELIAC PLEXUS Bilateral 2019    Procedure: BLOCK / INJECTION CELIAC PLEXUS;  Surgeon: Lorne Fierro MD;  [facility];  Service: Pain Management     NJ INJECT NERV BLCK,CELIAC PLEXUS Bilateral 2019    Procedure: SPLANCHNIC NERVE BLOCK;  Surgeon: Lorne Fierro MD;  [facility];  Service: Pain Management     NJ INJECT NERV BLCK,CELIAC PLEXUS Bilateral 10/17/2019    Procedure: SPLANCHNIC NERVE BLOCK;  Surgeon: Lorne Fierro MD;  [facility];  Service: Pain Management     NJ INJECT NERV Degroot Gravely Bilateral 2017    Procedure: SPLANCHNIC NERVE BLOCK;  Surgeon: Lorne Fierro MD;  [facility];  Service: Pain Management     NJ LAP,CHOLECYSTECTOMY N/A 2017    Procedure: LAPAROSCOPIC CHOLECYSTECTOMY, IOC, POSSIBLE OPEN ;  Surgeon: Janneth Watts MD;  [facility];  Service: General    ROTATOR CUFF REPAIR Right     SHOULDER ARTHROSCOPY      SHOULDER ARTHROSCOPY Right      Social History   Social History     Substance and Sexual Activity   Alcohol Use Not Currently    Alcohol/week: 10 0 standard drinks    Types: 10 Cans of beer per week    Comment: quit     Social History     Substance and Sexual Activity   Drug Use No     Social History     Tobacco Use   Smoking Status Former Smoker    Packs/day: 1 00    Years: 20 00    Pack years: 20 00    Quit date: 3/15/2021    Years since quittin 4 Smokeless Tobacco Never Used     Family History   Problem Relation Age of Onset    Cirrhosis Mother     Heart disease Other         cardiac disorder    Cancer Other        Meds/Allergies     (Not in a hospital admission)    Current Facility-Administered Medications   Medication Dose Route Frequency    albuterol (PROVENTIL HFA,VENTOLIN HFA) inhaler 2 puff  2 puff Inhalation Q6H PRN    aluminum-magnesium hydroxide-simethicone (MYLANTA) oral suspension 30 mL  30 mL Oral Q6H PRN    amLODIPine (NORVASC) tablet 5 mg  5 mg Oral Daily    atorvastatin (LIPITOR) tablet 80 mg  80 mg Oral Daily With Dinner    famotidine (PEPCID) tablet 40 mg  40 mg Oral BID    fenofibrate (TRICOR) tablet 145 mg  145 mg Oral Daily    insulin glargine (LANTUS) subcutaneous injection 27 Units 0 27 mL  27 Units Subcutaneous QAM    insulin lispro (HumaLOG) 100 units/mL subcutaneous injection 1-5 Units  1-5 Units Subcutaneous TID AC    insulin lispro (HumaLOG) 100 units/mL subcutaneous injection 1-5 Units  1-5 Units Subcutaneous HS    lactated ringers infusion  75 mL/hr Intravenous Continuous    Lidocaine Viscous HCl (XYLOCAINE) 2 % mucosal solution 15 mL  15 mL Swish & Swallow 4x Daily PRN    lisinopril (ZESTRIL) tablet 20 mg  20 mg Oral Daily    ondansetron (ZOFRAN) injection 4 mg  4 mg Intravenous Q6H PRN    oxyCODONE (ROXICODONE) IR tablet 5 mg  5 mg Oral Q6H PRN    pancrelipase (Lip-Prot-Amyl) (CREON) delayed release capsule 12,000 Units  12,000 Units Oral Daily PRN    pancrelipase (Lip-Prot-Amyl) (CREON) delayed release capsule 36,000 Units  36,000 Units Oral TID With Meals    pantoprazole (PROTONIX) EC tablet 40 mg  40 mg Oral BID AC    pregabalin (LYRICA) capsule 100 mg  100 mg Oral BID    saccharomyces boulardii (FLORASTOR) capsule 250 mg  250 mg Oral BID    traZODone (DESYREL) tablet 50 mg  50 mg Oral HS       Allergies   Allergen Reactions    Bee Venom Swelling       Objective   Blood pressure 154/77, pulse 61, temperature 98 °F (36 7 °C), temperature source Oral, resp  rate 18, height 5' 11" (1 803 m), weight 90 7 kg (200 lb), SpO2 96 %  No intake or output data in the 24 hours ending 09/13/21 1216      PHYSICAL EXAM:      General Appearance:   Alert, cooperative, no distress, appears stated age    HEENT:   Normocephalic, atraumatic, anicteric      Neck:  Supple, symmetrical, trachea midline, no adenopathy;    thyroid: no enlargement/tenderness/nodules; no carotid  bruit or JVD    Lungs:   Clear to auscultation bilaterally; no rales, rhonchi or wheezing; respirations unlabored    Heart[de-identified]   S1 and S2 normal; regular rate and rhythm; no murmur, rub, or gallop     Abdomen:   Soft, non-tender, non-distended; normal bowel sounds; no masses, no organomegaly    Genitalia:   Deferred    Rectal:   Deferred    Extremities:  No cyanosis, clubbing or edema    Pulses:  2+ and symmetric all extremities    Skin:  Skin color, texture, turgor normal, no rashes or lesions    Lymph nodes:  No palpable cervical, axillary or inguinal lymphadenopathy        Lab Results:   Admission on 09/12/2021   Component Date Value    WBC 09/12/2021 6 62     RBC 09/12/2021 4 40     Hemoglobin 09/12/2021 14 3     Hematocrit 09/12/2021 41 9     MCV 09/12/2021 95     MCH 09/12/2021 32 5     MCHC 09/12/2021 34 1     RDW 09/12/2021 12 8     MPV 09/12/2021 10 9     Platelets 32/34/1303 167     nRBC 09/12/2021 0     Neutrophils Relative 09/12/2021 51     Immat GRANS % 09/12/2021 0     Lymphocytes Relative 09/12/2021 36     Monocytes Relative 09/12/2021 10     Eosinophils Relative 09/12/2021 2     Basophils Relative 09/12/2021 1     Neutrophils Absolute 09/12/2021 3 42     Immature Grans Absolute 09/12/2021 0 01     Lymphocytes Absolute 09/12/2021 2 41     Monocytes Absolute 09/12/2021 0 64     Eosinophils Absolute 09/12/2021 0 10     Basophils Absolute 09/12/2021 0 04     Total Bilirubin 09/12/2021 1 44*    Bilirubin, Direct 09/12/2021 0 29*    Alkaline Phosphatase 09/12/2021 115     AST 09/12/2021 108*    ALT 09/12/2021 88*    Total Protein 09/12/2021 7 1     Albumin 09/12/2021 3 8     Sodium 09/12/2021 138     Potassium 09/12/2021 3 1*    Chloride 09/12/2021 100     CO2 09/12/2021 23     ANION GAP 09/12/2021 15*    BUN 09/12/2021 15     Creatinine 09/12/2021 0 82     Glucose 09/12/2021 142*    Calcium 09/12/2021 8 5     eGFR 09/12/2021 105     Lipase 09/12/2021 79     Troponin I 09/12/2021 <0 02     Color, UA 09/13/2021 Yellow     Clarity, UA 09/13/2021 Clear     Specific Gravity, UA 09/13/2021 1 010     pH, UA 09/13/2021 5 0     Leukocytes, UA 09/13/2021 Negative     Nitrite, UA 09/13/2021 Negative     Protein, UA 09/13/2021 Negative     Glucose, UA 09/13/2021 Negative     Ketones, UA 09/13/2021 Negative     Urobilinogen, UA 09/13/2021 0 2     Bilirubin, UA 09/13/2021 Negative     Blood, UA 09/13/2021 Negative     Triglycerides 09/12/2021 1,116*    LACTIC ACID 09/12/2021 1 1     POC Glucose 09/13/2021 118     Sodium 09/13/2021 140     Potassium 09/13/2021 3 9     Chloride 09/13/2021 105     CO2 09/13/2021 24     ANION GAP 09/13/2021 11     BUN 09/13/2021 10     Creatinine 09/13/2021 0 77     Glucose 09/13/2021 122     Calcium 09/13/2021 8 1*    eGFR 09/13/2021 107     POC Glucose 09/13/2021 110        Imaging Studies: I have personally reviewed pertinent imaging studies  ASSESSMENT & PLAN:  Epigastric abdominal pain  Chronic pancreatitis  Hypertriglyceridemia  -CT AP from admission shows gastric wall thickening as well as hepatomegaly   -Patient did undergo endoscopic ultrasound due to gastric wall thickening which has been a chronic finding on imaging in March of 2020   -At this time patient has already had a regular diet for breakfast   Will continue   -Ambulate as tolerated    -Pain control and antiemetics   -Continue all home meds   -Close outpatient follow-up with endocrinology   -Serial abdominal exams  -Incentive spirometer    -Pantoprazole 40 mg twice a day  Patient will be seen and examined by Peter John  Will repeat laboratories tomorrow morning and if everything is stable and patient continues to feel well he should be discharged home  Will place task message for patient to have outpatient follow-up appointment      Anabell Davis PA-C  9/13/2021,12:16 PM

## 2021-09-13 NOTE — ASSESSMENT & PLAN NOTE
· CT abdomen pelvis revealing diffuse gastric wall thickening related to gastritis, stable hepatomegaly  · Continue IV fluid hydration  · Low-fat diet  · GI consult

## 2021-09-13 NOTE — ASSESSMENT & PLAN NOTE
· Decreased at 3 1  · Will give 1 time dose IV potassium chloride 20 mEq and 1 time dose p o   Potassium chloride 40 mEq  · Recheck BMP

## 2021-09-13 NOTE — ED PROVIDER NOTES
History  Chief Complaint   Patient presents with    Abdominal Pain     pt c/o right sided abdominal pain that tsrated this morning, +n/v/d     Breanne Jenkins is a 61-year-old male with past medical history including hypertriglyceridemia, pancreatitis, GERD, diabetes presenting to the emergency department for evaluation with chief complaint of abdominal pain  Patient states he awoke with right-sided abdominal pain, described as sharp, with radiation toward the epigastric region  He states that he had experienced a couple episodes of nonbloody, nonbilious emesis this morning  Patient also reports the onset of diarrhea today, denying melena or bright red blood per rectum  The patient states that he has been compliant with his cholesterol and diabetes medications  He states that he had consumed 2 beers yesterday, denying frequent etoh use  He states that his pain feels similar to prior bouts of pancreatitis  The patient has no pertinent surgical history  He has not appreciated any fevers or chills with the onset of symptoms today  Denies any urinary symptoms including dysuria, urgency, frequency, cloudy or malodorous urine, or flank pain  He denies any recent diet changes as well as medication changes  The patient was previously evaluated in this emergency department on 08/27/2021 for hypertriglyceridemia noting that his baseline triglyceride level is 300-400  Patient is followed by [facility] for this  Patient offers no further complaints or concerns at this time  Prior to Admission Medications   Prescriptions Last Dose Informant Patient Reported? Taking?    Choline Fenofibrate (Fenofibric Acid) 135 MG CPDR  Self No No   Sig: Take 1 capsule (135 mg total) by mouth daily   EPIPEN 2-ARIEL 0 3 MG/0 3ML SOAJ  Self No No   Sig: Inject one syringe (0 3mg) intramuscularly once as directed   Lidocaine-Menthol 4-1 % GEL  Self Yes No   Sig: NuLido 4 %-1 % topical gel   Apply to affected area 2-4 times a day as needed for pain   Patient not taking: Reported on 2021   Pancrelipase, Lip-Prot-Amyl, (Creon) 94141 units CPEP  Self No No   Sig: Take 1 capsule (36,000 Units total) by mouth 3 (three) times a day before meals   albuterol (PROVENTIL HFA,VENTOLIN HFA) 90 mcg/act inhaler  Self No No   Sig: INHALE TWO PUFFS BY MOUTH EVERY 6 HOURS AS NEEDED FOR WHEEZING   amLODIPine (NORVASC) 5 mg tablet   No No   Sig: TAKE ONE TABLET BY MOUTH EVERY DAY    diclofenac sodium (VOLTAREN) 1 %  Self Yes No   Sig: diclofenac 1 % topical gel   APPLY 2 GRAMS TO THE AFFECTED AREA(S) BY TOPICAL ROUTE 4 TIMES PER DAY   Patient not taking: Reported on 2021   dicyclomine (BENTYL) 20 mg tablet  Self No No   Sig: Take 1 tablet (20 mg total) by mouth 2 (two) times a day   Patient not taking: Reported on 2021   esomeprazole (NexIUM) 40 MG capsule   No No   Sig: TAKE ONE CAPSULE BY MOUTH TWICE DAILY BEFORE MEALS    famotidine (PEPCID) 40 MG tablet  Self Yes No   Sig: Take 40 mg by mouth 2 (two) times a day   insulin degludec Daphane Mcardle FlexTouch) 100 units/mL injection pen  Self Yes No   Si units subcut in hs   lisinopril (ZESTRIL) 20 mg tablet   No No   Sig: TAKE ONE TABLET BY MOUTH EVERY DAY    methylPREDNISolone 4 MG tablet therapy pack   No No   Sig: Use as directed on package   omega-3-acid ethyl esters (LOVAZA) 1 g capsule   No No   Sig: TAKE TWO CAPSULES BY MOUTH TWICE DAILY    omeprazole (PriLOSEC) 20 mg delayed release capsule  Self Yes No   Sig: Take 20 mg by mouth daily   ondansetron (ZOFRAN-ODT) 4 mg disintegrating tablet   No No   Sig: DISSOLVE ONE TABLET IN MOUTH EVERY EIGHT HOURS AS NEEDED NAUSEA OR VOMITING    oxyCODONE-acetaminophen (PERCOCET) 5-325 mg per tablet  Self No No   Sig: Take 1 tablet by mouth every 6 (six) hours as needed for moderate painMax Daily Amount: 4 tablets   Patient not taking: Reported on 2021   pancrelipase, Lip-Prot-Amyl, (Creon) 12,000 units capsule  Self No No   Sig: Take 12,000 units of lipase by mouth as needed for snacks   pregabalin (LYRICA) 100 mg capsule  Self No No   Sig: TAKE ONE CAPSULE BY MOUTH TWICE DAILY    rosuvastatin (CRESTOR) 40 MG tablet  Self No No   Sig: TAKE ONE TABLET BY MOUTH ONCE A DAY    traZODone (DESYREL) 50 mg tablet   No No   Sig: TAKE ONE TABLET BY MOUTH NIGHTLY AT BEDTIME       Facility-Administered Medications: None       Past Medical History:   Diagnosis Date    Asthma     Chronic pain disorder     Diabetes mellitus (HCC)     GERD (gastroesophageal reflux disease)     Hyperlipidemia     Liver abscess     Meniscus tear     left knee work injury last assessed 08/24/2016    Pancreatitis     Pneumonia        Past Surgical History:   Procedure Laterality Date    CELIAC PLEXUS BLOCK Bilateral 10/29/2018    Procedure: SPLANCHNIC NERVE BLOCK;  Surgeon: Nicole Bone MD;  [facility];  Service: Pain Management     CELIAC PLEXUS BLOCK Bilateral 1/10/2019    Procedure: SPLANCHNIC NERVE BLOCK;  Surgeon: Nicole Bone MD;  [facility];  Service: Pain Management     CHOLECYSTECTOMY      ESOPHAGOGASTRODUODENOSCOPY N/A 11/16/2017    Procedure: ESOPHAGOGASTRODUODENOSCOPY (EGD); Surgeon: Liberty Crow MD;  [facility]; Service: Gastroenterology    ESOPHAGOGASTRODUODENOSCOPY N/A 4/19/2018    Procedure: ESOPHAGOGASTRODUODENOSCOPY (EGD); Surgeon: Caitlin Marvin MD;  [facility]; Service: Gastroenterology    ESOPHAGOGASTRODUODENOSCOPY N/A 5/10/2018    Procedure: ESOPHAGOGASTRODUODENOSCOPY (EGD); Surgeon: Pancho Murphy MD;  [facility];   Service: Gastroenterology    IR TEMPORARY DIALYSIS CATHETER PLACEMENT  2/28/2019    KNEE ARTHROSCOPY Bilateral     KNEE ARTHROSCOPY Right 2009    cibishino last assessed 08/24/2016    KNEE ARTHROSCOPY Right 07/01/2019    LIVER SURGERY      NERVE BLOCK Bilateral 5/29/2018    Procedure: SPLANCHNIC NERVE BLOCK;  Surgeon: Nicole Bone MD;  [facility];  Service: Pain Management     PANCREAS SURGERY      stents    PANCREATIC CYST EXCISION      PA INJECT NERV BLCK,CELIAC PLEXUS Bilateral 5/9/2017    Procedure: CELIAC PLEXUS BLOCK ;  Surgeon: Lizzette Castro MD;  [facility];  Service: Pain Management     PA INJECT NERV BLCK,CELIAC PLEXUS Bilateral 6/1/2017    Procedure: SPLANCHNIC NERVE BLOCK at T12;  Surgeon: Lizzette Castro MD;  [facility];  Service: Pain Management     PA INJECT NERV BLCK,CELIAC PLEXUS Bilateral 8/8/2017    Procedure: BILATERAL SPLANCHNIC NERVE BLOCK T12;  Surgeon: Lizzette Castro MD;  [facility];  Service: Pain Management     PA INJECT NERV BLCK,CELIAC PLEXUS Bilateral 4/18/2019    Procedure: BLOCK / INJECTION CELIAC PLEXUS;  Surgeon: Lizzette Castro MD;  [facility];  Service: Pain Management     PA INJECT NERV BLCK,CELIAC PLEXUS Bilateral 8/20/2019    Procedure: SPLANCHNIC NERVE BLOCK;  Surgeon: Lizzette Castro MD;  [facility];  Service: Pain Management     PA INJECT NERV BLCK,CELIAC PLEXUS Bilateral 10/17/2019    Procedure: SPLANCHNIC NERVE BLOCK;  Surgeon: Lizzette Castro MD;  [facility];  Service: Pain Management     PA INJECT NERV Roman Kehr Bilateral 12/28/2017    Procedure: SPLANCHNIC NERVE BLOCK;  Surgeon: Lizzette Castro MD;  [facility];  Service: Pain Management     PA LAP,CHOLECYSTECTOMY N/A 2/14/2017    Procedure: LAPAROSCOPIC CHOLECYSTECTOMY, IOC, POSSIBLE OPEN ;  Surgeon: Colleen Garber MD;  [facility];  Service: General    ROTATOR CUFF REPAIR Right     SHOULDER ARTHROSCOPY      SHOULDER ARTHROSCOPY Right        Family History   Problem Relation Age of Onset    Cirrhosis Mother     Heart disease Other         cardiac disorder    Cancer Other      I have reviewed and agree with the history as documented      E-Cigarette/Vaping    E-Cigarette Use Never User      E-Cigarette/Vaping Substances    Nicotine No     THC No     CBD No     Flavoring No     Other No     Unknown No      Social History     Tobacco Use    Smoking status: Former Smoker     Packs/day: 1 00     Years: 20 00     Pack years: 20 00     Quit date: 3/15/2021     Years since quittin 4    Smokeless tobacco: Never Used   Vaping Use    Vaping Use: Never used   Substance Use Topics    Alcohol use: Not Currently     Alcohol/week: 10 0 standard drinks     Types: 10 Cans of beer per week     Comment: quit    Drug use: No       Review of Systems   Constitutional: Negative for chills, diaphoresis and fever  Gastrointestinal: Positive for abdominal pain, diarrhea, nausea and vomiting  All other systems reviewed and are negative  Physical Exam  Physical Exam  Vitals and nursing note reviewed  Constitutional:       General: He is not in acute distress  Appearance: He is well-developed  He is not ill-appearing, toxic-appearing or diaphoretic  HENT:      Head: Normocephalic and atraumatic  Eyes:      Conjunctiva/sclera: Conjunctivae normal    Cardiovascular:      Rate and Rhythm: Normal rate and regular rhythm  Pulses: Normal pulses  Pulmonary:      Effort: Pulmonary effort is normal  No respiratory distress  Breath sounds: Normal breath sounds  No wheezing, rhonchi or rales  Abdominal:      General: Bowel sounds are normal  There is distension  Palpations: Abdomen is soft  There is hepatomegaly  Tenderness: There is abdominal tenderness in the epigastric area  There is no right CVA tenderness, left CVA tenderness or rebound  Negative signs include Rovsing's sign and McBurney's sign  Comments: The patient has right-sided abdominal tenderness to palpation but is more so tender in the epigastric region  Musculoskeletal:         General: No tenderness  Normal range of motion  Cervical back: Normal range of motion and neck supple  Skin:     General: Skin is warm and dry  Capillary Refill: Capillary refill takes less than 2 seconds  Neurological:      Mental Status: He is alert     Psychiatric:         Mood and Affect: Mood normal          Vital Signs  ED Triage Vitals [09/12/21 2131]   Temperature Pulse Respirations Blood Pressure SpO2   98 °F (36 7 °C) 87 19 141/99 96 %      Temp Source Heart Rate Source Patient Position - Orthostatic VS BP Location FiO2 (%)   Oral Monitor Sitting Left arm --      Pain Score       8           Vitals:    09/12/21 2131 09/12/21 2205 09/12/21 2330   BP: 141/99 164/91 147/80   Pulse: 87 77 71   Patient Position - Orthostatic VS: Sitting Lying Lying         Visual Acuity      ED Medications  Medications   sodium chloride 0 9 % bolus 2,000 mL (2,000 mL Intravenous New Bag 9/12/21 2322)   ondansetron (ZOFRAN) injection 4 mg (4 mg Intravenous Given 9/12/21 2327)   HYDROmorphone (DILAUDID) injection 1 mg (1 mg Intravenous Given 9/12/21 2327)       Diagnostic Studies  Results Reviewed     Procedure Component Value Units Date/Time    Fingerstick Glucose (POCT) [[id number]]  (Normal) Collected: 09/14/21 1104    Lab Status: Final result Updated: 09/14/21 1115     POC Glucose 116 mg/dl     Fingerstick Glucose (POCT) [[id number]]  (Normal) Collected: 09/14/21 0755    Lab Status: Final result Updated: 09/14/21 0808     POC Glucose 124 mg/dl     Lipid panel [[id number]]  (Abnormal) Collected: 09/14/21 0529    Lab Status: Final result Specimen: Blood from Arm, Right Updated: 09/14/21 0601     Cholesterol 171 mg/dL      Triglycerides 672 mg/dL      HDL, Direct 23 mg/dL      LDL Calculated --     Non-HDL-Chol (CHOL-HDL) 148 mg/dl     Fingerstick Glucose (POCT) [[id number]]  (Normal) Collected: 09/13/21 2224    Lab Status: Final result Updated: 09/13/21 2226     POC Glucose 117 mg/dl     Fingerstick Glucose (POCT) [[id number]]  (Normal) Collected: 09/13/21 1555    Lab Status: Final result Updated: 09/13/21 1600     POC Glucose 130 mg/dl     Basic metabolic panel [[id number]]  (Abnormal) Collected: 09/13/21 1108    Lab Status: Final result Specimen: Blood from Arm, Right Updated: 09/13/21 1203     Sodium 140 mmol/L      Potassium 3 9 mmol/L      Chloride 105 mmol/L      CO2 24 mmol/L      ANION GAP 11 mmol/L      BUN 10 mg/dL      Creatinine 0 77 mg/dL      Glucose 122 mg/dL      Calcium 8 1 mg/dL      eGFR 107 ml/min/1 73sq m     Narrative:      [place] guidelines for Chronic Kidney Disease (CKD):     Stage 1 with normal or high GFR (GFR > 90 mL/min/1 73 square meters)    Stage 2 Mild CKD (GFR = 60-89 mL/min/1 73 square meters)    Stage 3A Moderate CKD (GFR = 45-59 mL/min/1 73 square meters)    Stage 3B Moderate CKD (GFR = 30-44 mL/min/1 73 square meters)    Stage 4 Severe CKD (GFR = 15-29 mL/min/1 73 square meters)    Stage 5 End Stage CKD (GFR <15 mL/min/1 73 square meters)  Note: GFR calculation is accurate only with a steady state creatinine    Fingerstick Glucose (POCT) [[id number]]  (Normal) Collected: 09/13/21 1102    Lab Status: Final result Updated: 09/13/21 1113     POC Glucose 110 mg/dl     Fingerstick Glucose (POCT) [[id number]]  (Normal) Collected: 09/13/21 0731    Lab Status: Final result Updated: 09/13/21 0737     POC Glucose 118 mg/dl     UA w Reflex to Microscopic w Reflex to Culture [[id number]] Collected: 09/13/21 0309    Lab Status: Final result Specimen: Urine, Clean Catch Updated: 09/13/21 0318     Color, UA Yellow     Clarity, UA Clear     Specific Gravity, UA 1 010     pH, UA 5 0     Leukocytes, UA Negative     Nitrite, UA Negative     Protein, UA Negative mg/dl      Glucose, UA Negative mg/dl      Ketones, UA Negative mg/dl      Urobilinogen, UA 0 2 E U /dl      Bilirubin, UA Negative     Blood, UA Negative    Hepatic function panel [[id number]]  (Abnormal) Collected: 09/12/21 2320    Lab Status: Final result Specimen: Blood from Arm, Right Updated: 09/13/21 0106     Total Bilirubin 1 44 mg/dL      Bilirubin, Direct 0 29 mg/dL      Alkaline Phosphatase 115 U/L       U/L      ALT 88 U/L      Total Protein 7 1 g/dL      Albumin 3 8 g/dL     Triglycerides [[id number]]  (Abnormal) Collected: 09/12/21 2320    Lab Status: Final result Specimen: Blood from Arm, Right Updated: 09/13/21 0041     Triglycerides 1,116 mg/dL     Basic metabolic panel [[id number]]  (Abnormal) Collected: 09/12/21 2320    Lab Status: Final result Specimen: Blood from Arm, Right Updated: 09/13/21 0023     Sodium 138 mmol/L      Potassium 3 1 mmol/L      Chloride 100 mmol/L      CO2 23 mmol/L      ANION GAP 15 mmol/L      BUN 15 mg/dL      Creatinine 0 82 mg/dL      Glucose 142 mg/dL      Calcium 8 5 mg/dL      eGFR 105 ml/min/1 73sq m     Narrative:      [place] guidelines for Chronic Kidney Disease (CKD):     Stage 1 with normal or high GFR (GFR > 90 mL/min/1 73 square meters)    Stage 2 Mild CKD (GFR = 60-89 mL/min/1 73 square meters)    Stage 3A Moderate CKD (GFR = 45-59 mL/min/1 73 square meters)    Stage 3B Moderate CKD (GFR = 30-44 mL/min/1 73 square meters)    Stage 4 Severe CKD (GFR = 15-29 mL/min/1 73 square meters)    Stage 5 End Stage CKD (GFR <15 mL/min/1 73 square meters)  Note: GFR calculation is accurate only with a steady state creatinine    Lipase [[id number]]  (Normal) Collected: 09/12/21 2320    Lab Status: Final result Specimen: Blood from Arm, Right Updated: 09/13/21 0023     Lipase 79 u/L     Lactic acid, plasma [[id number]]  (Normal) Collected: 09/12/21 2319    Lab Status: Final result Specimen: Blood from Arm, Right Updated: 09/13/21 0016     LACTIC ACID 1 1 mmol/L     Narrative:      Result may be elevated if tourniquet was used during collection      Troponin I [[id number]]  (Normal) Collected: 09/12/21 2320    Lab Status: Final result Specimen: Blood from Arm, Right Updated: 09/13/21 0015     Troponin I <0 02 ng/mL     CBC and differential [[id number]] Collected: 09/12/21 2319    Lab Status: Final result Specimen: Blood from Arm, Right Updated: 09/12/21 2355     WBC 6 62 Thousand/uL      RBC 4 40 Million/uL      Hemoglobin 14 3 g/dL      Hematocrit 41 9 %      MCV 95 fL      MCH 32 5 pg      MCHC 34 1 g/dL      RDW 12 8 %      MPV 10 9 fL      Platelets 994 Thousands/uL      nRBC 0 /100 WBCs      Neutrophils Relative 51 %      Immat GRANS % 0 %      Lymphocytes Relative 36 %      Monocytes Relative 10 %      Eosinophils Relative 2 %      Basophils Relative 1 %      Neutrophils Absolute 3 42 Thousands/µL      Immature Grans Absolute 0 01 Thousand/uL      Lymphocytes Absolute 2 41 Thousands/µL      Monocytes Absolute 0 64 Thousand/µL      Eosinophils Absolute 0 10 Thousand/µL      Basophils Absolute 0 04 Thousands/µL                  CT abdomen pelvis with contrast   Final Result by Stevie Erazo MD (09/13 7209)      1  Redemonstrated diffuse gastric wall thickening which may be due to gastritis  2   Stable hepatomegaly              Workstation performed: [id number]         XR chest pa & lateral   ED Interpretation by Bjorn Fitzgerald PA-C (09/13 4593)   No acute cardiopulmonary process      Final Result by Magalie Moraes MD (09/13 5404)      No acute disease in the chest                   Workstation performed: [id number]                    Procedures  ECG 12 Lead Documentation Only    Date/Time: 9/13/2021 12:03 AM  Performed by: Bjorn Fitzgerald PA-C  Authorized by: Bjorn Fitzgerald PA-C     Indications / Diagnosis:  Epigastric pain  Patient location:  ED  Previous ECG:     Previous ECG:  Unavailable  Interpretation:     Interpretation: abnormal    Rate:     ECG rate:  70    ECG rate assessment: normal    Rhythm:     Rhythm: sinus rhythm    Ectopy:     Ectopy: none    QRS:     QRS axis:  Normal    QRS intervals:  Normal  Conduction:     Conduction: abnormal      Abnormal conduction: incomplete RBBB    ST segments:     ST segments:  Normal  T waves:     T waves: normal               ED Course                             SBIRT 22yo+      Most Recent Value   SBIRT (24 yo +)   In order to provide better care to our patients, we are screening all of our patients for alcohol and drug use  Would it be okay to ask you these screening questions? Yes Filed at: 09/12/2021 2332   Initial Alcohol Screen: US AUDIT-C    1  How often do you have a drink containing alcohol?  0 Filed at: 09/12/2021 2332   2  How many drinks containing alcohol do you have on a typical day you are drinking? 0 Filed at: 09/12/2021 2332   3a  Male UNDER 65: How often do you have five or more drinks on one occasion? 0 Filed at: 09/12/2021 2332   3b  FEMALE Any Age, or MALE 65+: How often do you have 4 or more drinks on one occassion? 0 Filed at: 09/12/2021 2332   Audit-C Score  0 Filed at: 09/12/2021 2332   EUSEBIO: How many times in the past year have you    Used an illegal drug or used a prescription medication for non-medical reasons? Never Filed at: 09/12/2021 2332                    MDM  Number of Diagnoses or Management Options  Abdominal pain: new and requires workup  Gastritis: new and requires workup  Hypertriglyceridemia: new and requires workup  Diagnosis management comments: This is a 77-year-old male with past medical history including hypertriglyceridemia and pancreatitis presenting to the emergency department for evaluation with chief complaint of epigastric abdominal pain  Patient finds symptoms are similar to prior episodes of pancreatitis  He does report the onset of vomiting today which has resolved though persists with nausea  Patient denies fevers, chills, diaphoresis      Differential diagnosis includes but not limited to:  Esophagitis, gastritis, duodenitis, GERD, peptic ulcer disease, perforated viscus ulcer, pancreatitis 2/2 hypertriglyceridemia, cholelithiasis, acute cholecystitis, choledocholithiasis, hepatitis, appendicitis, colitis, diverticulitis, will obtain screening ECG/troponin though cardiac etiology less likely, vascular process s/a aaa less likely    Initial ED plan:  ECG, chest x-ray, abdominal labs, CT abdomen/pelvis    Final ED Assessment:  Vital signs stable on hospital presentation, examination as above  All labs and imaging independently reviewed with imaging interpreted by the radiologist   ECG without ischemic changes, troponin within normal   Lipase within normal as well  No leukocytosis or lactic acidosis  Patient found to have hypertriglyceridemia of 1116, glucose mildly elevated at 142  CT abdomen/pelvis reports stable hepatomegaly as well as redemonstrated diffuse gastric wall thickening which may be due to gastritis  Protonix was given in addition to analgesics  The patient had been updated of all lab and imaging results today  The patient's pain had ultimately persisted and he felt uncomfortable managing these symptoms at home for which I had recommended hospital admission for pain control and consideration of gastroenterology consultation which the patient is understanding and agreeable with  The case had been discussed with Jasmina BREWER, patient accepted to Medicine Service  Bridging orders placed            Amount and/or Complexity of Data Reviewed  Clinical lab tests: ordered and reviewed  Tests in the radiology section of CPT®: ordered and reviewed  Review and summarize past medical records: yes  Independent visualization of images, tracings, or specimens: yes    Risk of Complications, Morbidity, and/or Mortality  Presenting problems: moderate  Diagnostic procedures: moderate  Management options: moderate    Patient Progress  Patient progress: stable      Disposition  Final diagnoses:   Abdominal pain   Gastritis   Hypertriglyceridemia     Time reflects when diagnosis was documented in both MDM as applicable and the Disposition within this note     Time User Action Codes Description Comment    9/13/2021  3:33 AM Montezuma [place] Add [R10 9] Abdominal pain     9/13/2021  3:33 AM Constantine [place] Add [K29 70] Gastritis     9/13/2021  3:33 AM [place] Add [E78 1] Hypertriglyceridemia     9/14/2021  8:36 AM Haylee Juliocesar SOLORZANO Add [R10 13] Abdominal pain, epigastric       ED Disposition     ED Disposition Condition Date/Time Comment    Admit Stable Mon Sep 13, 2021  3:33 AM Case was discussed with Cherry Amato and the patient's admission status was agreed to be Admission Status: observation status to the service of Dr Maureen Prather   Follow-up Information     Follow up With Specialties Details Why Contact Info    Laurie Lund, [address], Nurse Practitioner Schedule an appointment as soon as possible for a visit in 1 week(s) Please call to get a CMP in 1 week [address] [facility] [address]  [phone]            Patient's Medications   Discharge Prescriptions    No medications on file     No discharge procedures on file      PDMP Review       Value Time User    PDMP Reviewed  Yes 8/5/2021  8:33 AM Jeremiah Gallego MD          ED Provider  Electronically Signed by           Bjorn Fitzgerald PA-C  09/17/21 2811

## 2021-09-13 NOTE — ASSESSMENT & PLAN NOTE
· Continue outpatient follow-up with endocrinology  · Will continue home long-acting insulin 27 units q d    · Sliding scale insulin  · Blood glucose checks q i d , hypoglycemia protocol

## 2021-09-14 VITALS
HEIGHT: 71 IN | OXYGEN SATURATION: 96 % | TEMPERATURE: 97.6 F | WEIGHT: 200 LBS | SYSTOLIC BLOOD PRESSURE: 131 MMHG | DIASTOLIC BLOOD PRESSURE: 74 MMHG | BODY MASS INDEX: 28 KG/M2 | HEART RATE: 62 BPM | RESPIRATION RATE: 33 BRPM

## 2021-09-14 PROBLEM — K29.00 ACUTE GASTRITIS: Status: RESOLVED | Noted: 2017-11-15 | Resolved: 2021-09-14

## 2021-09-14 PROBLEM — E87.6 HYPOKALEMIA: Status: RESOLVED | Noted: 2021-09-13 | Resolved: 2021-09-14

## 2021-09-14 LAB
CHOLEST SERPL-MCNC: 171 MG/DL (ref 50–200)
GLUCOSE SERPL-MCNC: 116 MG/DL (ref 65–140)
GLUCOSE SERPL-MCNC: 124 MG/DL (ref 65–140)
HDLC SERPL-MCNC: 23 MG/DL
NONHDLC SERPL-MCNC: 148 MG/DL
TRIGL SERPL-MCNC: 672 MG/DL

## 2021-09-14 PROCEDURE — 80061 LIPID PANEL: CPT | Performed by: PHYSICIAN ASSISTANT

## 2021-09-14 PROCEDURE — 99217 PR OBSERVATION CARE DISCHARGE MANAGEMENT: CPT | Performed by: FAMILY MEDICINE

## 2021-09-14 PROCEDURE — 82948 REAGENT STRIP/BLOOD GLUCOSE: CPT

## 2021-09-14 PROCEDURE — 36415 COLL VENOUS BLD VENIPUNCTURE: CPT | Performed by: PHYSICIAN ASSISTANT

## 2021-09-14 RX ORDER — OXYCODONE HYDROCHLORIDE 5 MG/1
5 TABLET ORAL EVERY 6 HOURS PRN
Qty: 10 TABLET | Refills: 0 | Status: SHIPPED | OUTPATIENT
Start: 2021-09-14 | End: 2021-09-16 | Stop reason: HOSPADM

## 2021-09-14 RX ADMIN — FAMOTIDINE 40 MG: 20 TABLET ORAL at 09:54

## 2021-09-14 RX ADMIN — LISINOPRIL 20 MG: 10 TABLET ORAL at 10:10

## 2021-09-14 RX ADMIN — FENOFIBRATE 145 MG: 145 TABLET, FILM COATED ORAL at 09:56

## 2021-09-14 RX ADMIN — AMLODIPINE BESYLATE 5 MG: 5 TABLET ORAL at 10:10

## 2021-09-14 RX ADMIN — OXYCODONE HYDROCHLORIDE 5 MG: 5 TABLET ORAL at 05:23

## 2021-09-14 RX ADMIN — Medication 250 MG: at 09:56

## 2021-09-14 RX ADMIN — PANCRELIPASE 36000 UNITS: 24000; 76000; 120000 CAPSULE, DELAYED RELEASE PELLETS ORAL at 09:58

## 2021-09-14 RX ADMIN — PANTOPRAZOLE SODIUM 40 MG: 40 TABLET, DELAYED RELEASE ORAL at 09:54

## 2021-09-14 RX ADMIN — PREGABALIN 100 MG: 100 CAPSULE ORAL at 09:54

## 2021-09-14 RX ADMIN — INSULIN GLARGINE 27 UNITS: 100 INJECTION, SOLUTION SUBCUTANEOUS at 09:54

## 2021-09-14 NOTE — UTILIZATION REVIEW
Initial Clinical Review    Admission: Date/Time/Statement:   Admission Orders (From admission, onward)     Ordered        09/13/21 0333  Place in Observation  Once                   Orders Placed This Encounter   Procedures    Place in Observation     Standing Status:   Standing     Number of Occurrences:   1     Order Specific Question:   Level of Care     Answer:   Med Surg [16]     ED Arrival Information     Expected Arrival Acuity    - 9/12/2021 21:05 Urgent         Means of arrival Escorted by Service Admission type    Walk-In Self Hospitalist Urgent         Arrival complaint    ABDOMINAL PAIN         Chief Complaint   Patient presents with    Abdominal Pain     pt c/o right sided abdominal pain that tsrated this morning, +n/v/d       Initial Presentation: 47 yo male to ED from home w/ sudden onset of epigastric pain and right upper quadrant pain x1 week, gradually worse over the last day  Reports to vomiting yesterday but nothing today  feels this way when his triglycerides start to creep up again  Triglycerides 1116  Admitted OBS status for GI consult , SSI and glucose checks QID   Hypokalemia 3 1 replace and recheck   Acute gastritis cont IVF , low fat diet   abd tenderness , RUQ and epigastric area   9/13 GI Consult   Epigastric abd pain , chronic pancreatitis amb as colby , pain control , serial abd exams    Repeat labs in am       ED Triage Vitals [09/12/21 2131]   Temperature Pulse Respirations Blood Pressure SpO2   98 °F (36 7 °C) 87 19 141/99 96 %      Temp Source Heart Rate Source Patient Position - Orthostatic VS BP Location FiO2 (%)   Oral Monitor Sitting Left arm --      Pain Score       8          Wt Readings from Last 1 Encounters:   09/12/21 90 7 kg (200 lb)     Additional Vital Signs:   09/14/21 0700  97 6 °F (36 4 °C)  63  --  130/87  --  97 %  --  Lying   09/13/21 2018  --  85  33Abnormal   168/92  124  98 %  --  --   09/13/21 0908  --  --  --  154/77  --  --  --  --   09/13/21 9197  -- --  --  154/77  --  --  --  --   09/13/21 0735  --  61  18  126/67  90  96 %  --  --   09/13/21 0430  --  66  19  139/78  100  95 %  None (Room air)  Lying   09/13/21 0330  --  81  18  141/71  98  95 %  None (Room air)  Lying   09/13/21 0315  --  83  19  159/83  109  94 %  None (Room air)  Lying   09/13/21 0230  --  67  20  159/81  110  96 %  None (Room air)  Lying   09/13/21 0100  --  70  22  169/71  102  95 %  None (Room air)  Lying   09/12/21 2332  --  --  --  --  --  --  None (Room air)  --   09/12/21 2330  --  71  18  147/80  106  94 %  None (Room air)  Lying   09/12/21 2205  --  77  18  164/91  --  97 %  None (Room air         Pertinent Labs/Diagnostic Test Results:   9/13 CT abd - 1  Redemonstrated diffuse gastric wall thickening which may be due to gastritis    2   Stable hepatomegaly    9/12 CXR No acute disease in the chest   9/13 EKG NSR   Results from last 7 days   Lab Units 09/12/21  2319   WBC Thousand/uL 6 62   HEMOGLOBIN g/dL 14 3   HEMATOCRIT % 41 9   PLATELETS Thousands/uL 167   NEUTROS ABS Thousands/µL 3 42     Results from last 7 days   Lab Units 09/13/21  1108 09/12/21  2320   SODIUM mmol/L 140 138   POTASSIUM mmol/L 3 9 3 1*   CHLORIDE mmol/L 105 100   CO2 mmol/L 24 23   ANION GAP mmol/L 11 15*   BUN mg/dL 10 15   CREATININE mg/dL 0 77 0 82   EGFR ml/min/1 73sq m 107 105   CALCIUM mg/dL 8 1* 8 5     Results from last 7 days   Lab Units 09/12/21  2320   AST U/L 108*   ALT U/L 88*   ALK PHOS U/L 115   TOTAL PROTEIN g/dL 7 1   ALBUMIN g/dL 3 8   TOTAL BILIRUBIN mg/dL 1 44*   BILIRUBIN DIRECT mg/dL 0 29*     Results from last 7 days   Lab Units 09/13/21  2224 09/13/21  1555 09/13/21  1102 09/13/21  0731   POC GLUCOSE mg/dl 117 130 110 118     Results from last 7 days   Lab Units 09/13/21  1108 09/12/21  2320   GLUCOSE RANDOM mg/dL 122 142*     Results from last 7 days   Lab Units 09/12/21  2320   TROPONIN I ng/mL <0 02       Results from last 7 days   Lab Units 09/12/21  2319   LACTIC ACID mmol/L 1 1       Results from last 7 days   Lab Units 09/12/21  2320   LIPASE u/L 79     Results from last 7 days   Lab Units 09/13/21  0309   CLARITY UA  Clear   COLOR UA  Yellow   SPEC GRAV UA  1 010   PH UA  5 0   GLUCOSE UA mg/dl Negative   KETONES UA mg/dl Negative   BLOOD UA  Negative   PROTEIN UA mg/dl Negative   NITRITE UA  Negative   BILIRUBIN UA  Negative   UROBILINOGEN UA E U /dl 0 2   LEUKOCYTES UA  Negative     ED Treatment:   Medication Administration from 09/12/2021 2105 to 09/14/2021 0804       Date/Time Order Dose Route Action     09/12/2021 2327 ondansetron (ZOFRAN) injection 4 mg 4 mg Intravenous Given     09/12/2021 2322 sodium chloride 0 9 % bolus 2,000 mL 2,000 mL Intravenous New Bag     09/12/2021 2327 HYDROmorphone (DILAUDID) injection 1 mg 1 mg Intravenous Given     09/13/2021 0304 HYDROmorphone (DILAUDID) injection 1 mg 1 mg Intravenous Given     09/13/2021 0304 pantoprazole (PROTONIX) injection 40 mg 40 mg Intravenous Given     09/13/2021 0858 amLODIPine (NORVASC) tablet 5 mg 5 mg Oral Given     09/13/2021 0859 fenofibrate (TRICOR) tablet 145 mg 145 mg Oral Given     09/13/2021 1556 pantoprazole (PROTONIX) EC tablet 40 mg 40 mg Oral Given     09/13/2021 0730 pantoprazole (PROTONIX) EC tablet 40 mg 40 mg Oral Given     09/13/2021 1752 famotidine (PEPCID) tablet 40 mg 40 mg Oral Given     09/13/2021 0908 famotidine (PEPCID) tablet 40 mg 40 mg Oral Given     09/13/2021 0859 insulin glargine (LANTUS) subcutaneous injection 27 Units 0 27 mL 27 Units Subcutaneous Given     09/13/2021 0908 lisinopril (ZESTRIL) tablet 20 mg 20 mg Oral Given     09/13/2021 1557 pancrelipase (Lip-Prot-Amyl) (CREON) delayed release capsule 36,000 Units 36,000 Units Oral Given     09/13/2021 0909 pancrelipase (Lip-Prot-Amyl) (CREON) delayed release capsule 36,000 Units 36,000 Units Oral Given     09/13/2021 1752 pregabalin (LYRICA) capsule 100 mg 100 mg Oral Given     09/13/2021 0858 pregabalin (LYRICA) capsule 100 mg 100 mg Oral Given     09/13/2021 1556 atorvastatin (LIPITOR) tablet 80 mg 80 mg Oral Given     09/13/2021 2218 traZODone (DESYREL) tablet 50 mg 50 mg Oral Given     09/13/2021 4949 potassium chloride 20 mEq IVPB (premix) 20 mEq Intravenous New Bag     09/13/2021 0556 potassium chloride oral solution 40 mEq 40 mEq Oral Given     09/13/2021 1401 lactated ringers infusion 75 mL/hr Intravenous New Bag     09/13/2021 1752 saccharomyces boulardii (FLORASTOR) capsule 250 mg 250 mg Oral Given     09/13/2021 0901 saccharomyces boulardii (FLORASTOR) capsule 250 mg 250 mg Oral Given     09/13/2021 2218 ondansetron (ZOFRAN) injection 4 mg 4 mg Intravenous Given     09/13/2021 0555 oxyCODONE (ROXICODONE) IR tablet 2 5 mg 2 5 mg Oral Given     09/14/2021 0523 oxyCODONE (ROXICODONE) IR tablet 5 mg 5 mg Oral Given     09/13/2021 2218 oxyCODONE (ROXICODONE) IR tablet 5 mg 5 mg Oral Given     09/13/2021 1556 oxyCODONE (ROXICODONE) IR tablet 5 mg 5 mg Oral Given     09/13/2021 0956 oxyCODONE (ROXICODONE) IR tablet 5 mg 5 mg Oral Given     09/13/2021 2035 morphine injection 2 mg 2 mg Intravenous Given        Past Medical History:   Diagnosis Date    Asthma     Chronic pain disorder     Diabetes mellitus ([facility] [address] )     GERD (gastroesophageal reflux disease)     Hyperlipidemia     Liver abscess     Meniscus tear     left knee work injury last assessed 08/24/2016    Pancreatitis     Pneumonia      Present on Admission:   Hypertriglyceridemia   Acute gastritis   Hypokalemia   Prediabetes      Admitting Diagnosis: Abdominal pain [R10 9]  Age/Sex: 48 y o  male  Admission Orders:  Scheduled Medications:  amLODIPine, 5 mg, Oral, Daily  atorvastatin, 80 mg, Oral, Daily With Dinner  famotidine, 40 mg, Oral, BID  fenofibrate, 145 mg, Oral, Daily  insulin glargine, 27 Units, Subcutaneous, QAM  insulin lispro, 1-5 Units, Subcutaneous, TID AC  insulin lispro, 1-5 Units, Subcutaneous, HS  lisinopril, 20 mg, Oral, Daily  pancrelipase (Lip-Prot-Amyl), 36,000 Units, Oral, TID With Meals  pantoprazole, 40 mg, Oral, BID AC  pregabalin, 100 mg, Oral, BID  saccharomyces boulardii, 250 mg, Oral, BID  traZODone, 50 mg, Oral, HS      Continuous IV Infusions:  lactated ringers, 75 mL/hr, Intravenous, Continuous      PRN Meds:  albuterol, 2 puff, Inhalation, Q6H PRN  aluminum-magnesium hydroxide-simethicone, 30 mL, Oral, Q6H PRN  Lidocaine Viscous HCl, 15 mL, Swish & Swallow, 4x Daily PRN  ondansetron, 4 mg, Intravenous, Q6H PRN  9/13  x1  oxyCODONE, 5 mg, Oral, Q6H PRN  pancrelipase (Lip-Prot-Amyl), 12,000 Units, Oral, Daily PRN    Fingerstick ac and hs     IP CONSULT TO GASTROENTEROLOGY    Network Utilization Review Department  ATTENTION: Please call with any questions or concerns to [phone] and carefully listen to the prompts so that you are directed to the right person  All voicemails are confidential   Norman Lindquist all requests for admission clinical reviews, approved or denied determinations and any other requests to dedicated fax number below belonging to the campus where the patient is receiving treatment   List of dedicated fax numbers for the Facilities:  [address] DENIALS (Administrative/Medical Necessity) [phone]   [address] (Maternity/NICU/Pediatrics) [phone]   [address] [place] [address] [address] [place] [address] [address] [facility] [facility] [phone]

## 2021-09-14 NOTE — ASSESSMENT & PLAN NOTE
· CT abdomen pelvis revealing diffuse gastric wall thickening related to gastritis, stable hepatomegaly  · Continue PPI

## 2021-09-14 NOTE — ASSESSMENT & PLAN NOTE
· Triglycerides at 1116 with abdominal pain  · Appreciate GI consult  · Continue home long-acting insulin 27 units q d , statin, fenofibrate  · Continue home medications    The patient does have a follow-up with endocrinology on Monday

## 2021-09-14 NOTE — DISCHARGE SUMMARY
[facility]  [facility] 1975, 48 y [facility]  male MRN: [id number]  Unit/Bed#: ED 10 Encounter: [id number]  Primary Care Provider: Mumtaz Plata   Date and time admitted to hospital: 9/12/2021 10:00 PM    Prediabetes  Assessment & Plan  · Continue outpatient follow-up with endocrinology  · Will continue home long-acting insulin 27 units q d  · Sliding scale insulin  · Blood glucose checks q i d , hypoglycemia protocol    * Hypertriglyceridemia  Assessment & Plan  · Triglycerides at 1116 with abdominal pain  · Appreciate GI consult  · Continue home long-acting insulin 27 units q d , statin, fenofibrate  · Continue home medications  The patient does have a follow-up with endocrinology on Monday    Acute gastritis-resolved as of 9/14/2021  Assessment & Plan  · CT abdomen pelvis revealing diffuse gastric wall thickening related to gastritis, stable hepatomegaly  · Continue PPI      Discharging Physician / Practitioner: Rosanne Stokes MD  PCP: Mumtaz Plata  Admission Date:   Admission Orders (From admission, onward)     Ordered        09/13/21 0333  Place in Observation  Once                   Discharge Date: 09/14/21    Medical Problems     Resolved Problems  Date Reviewed: 9/14/2021        Resolved    Acute gastritis 9/14/2021     Resolved by  Rosanne Stokes MD    Overview Signed 11/16/2017  9:48 AM by Ap Poe PA-C     Added automatically from request for surgery [id number]         Hypokalemia 9/14/2021     Resolved by  Rosanne Stokes MD                Consultations During Hospital Stay:  · Gastroenterology    Procedures Performed:   · CT scan:  Shows some diffuse gastric wall thickening which may be due to gastritis  There was stable hepatomegaly    Significant Findings / Test Results:   · See above  · Triglycerides on discharge were 672    Incidental Findings:   · See above     Test Results Pending at Discharge (will require follow up):    · None     Outpatient Tests Requested:  · CMP in 1 week    Complications:  None    Reason for Admission:  Abdominal pain    Hospital Course:     Magi Sorensen is a 48 y o  male patient who originally presented to the hospital on 2021 due to abdominal pain  History presenting Logan Nath is a 48 y o  male with a PMH of familial hypertriglyceridemia, chronic pancreatitis, chronic gastritis, pre diabetes who presents with complaint of sudden onset of epigastric pain and right upper quadrant pain x1 week, gradually worse over the last day  Reports to vomiting yesterday but nothing today  Denies nausea currently  Reports he feels this way when his triglycerides start to creep up again     Denies daily alcohol use or binge drinking  Reports he had 2 beers while at a  this past week  Hospital course:  Patient was admitted for above reasons  The patient has recurrent episodes of this  Patient's pain has improved to 7/10 today and he states that is okay  It is acceptable for him to go home at this time  I did speak to Gastroenterology  From their standpoint patient is okay for discharge  Patient has a follow-up with endocrinology on Monday  Patient's triglycerides did improve from 1100 to 600 during the hospitalization  Please see above list of diagnoses and related plan for additional information  Condition at Discharge: fair     Discharge Day Visit / Exam:     Subjective:  Patient seen examined  States he feels okay right now  Still has some pain but has improved    Vitals: Blood Pressure: 130/87 (21)  Pulse: 63 (21)  Temperature: 97 6 °F (36 4 °C) (21)  Temp Source: Oral (21)  Respirations: (!) 33 (21)  Height: 5' 11" (180 3 cm) (21)  Weight - Scale: 90 7 kg (200 lb) (21)  SpO2: 97 % (21)  Exam:   Physical Exam  (   General Appearance:    Alert, cooperative, no distress, appears stated age Lungs:     Clear to auscultation bilaterally, respirations unlabored       Heart:    Regular rate and rhythm, S1 and S2 normal, no murmur, rub    or gallop   Abdomen:     Mildly distended  Bowel sounds are present  There is no rebound or guarding           Extremities:   Extremities normal, atraumatic, no cyanosis or edema         Discharge instructions/Information to patient and family:   See after visit summary for information provided to patient and family  Provisions for Follow-Up Care:  See after visit summary for information related to follow-up care and any pertinent home health orders  Disposition:     Home    For Discharges to [facility] SNF:   · Not Applicable to this Patient - Not Applicable to this Patient    Planned Readmission:  Not anticipated     Discharge Statement:  I spent 25 minutes discharging the patient  This time was spent on the day of discharge  I had direct contact with the patient on the day of discharge  Greater than 50% of the total time was spent examining patient, answering all patient questions, arranging and discussing plan of care with patient as well as directly providing post-discharge instructions  Additional time then spent on discharge activities  Discharge Medications:  See after visit summary for reconciled discharge medications provided to patient and family        ** Please Note: This note has been constructed using a voice recognition system **

## 2021-09-15 ENCOUNTER — TELEPHONE (OUTPATIENT)
Dept: GASTROENTEROLOGY | Facility: CLINIC | Age: 46
End: 2021-09-15

## 2021-09-16 ENCOUNTER — TRANSITIONAL CARE MANAGEMENT (OUTPATIENT)
Dept: FAMILY MEDICINE CLINIC | Facility: CLINIC | Age: 46
End: 2021-09-16

## 2021-09-16 ENCOUNTER — OFFICE VISIT (OUTPATIENT)
Dept: FAMILY MEDICINE CLINIC | Facility: CLINIC | Age: 46
End: 2021-09-16
Payer: COMMERCIAL

## 2021-09-16 VITALS
HEIGHT: 71 IN | BODY MASS INDEX: 28 KG/M2 | OXYGEN SATURATION: 97 % | DIASTOLIC BLOOD PRESSURE: 78 MMHG | HEART RATE: 75 BPM | WEIGHT: 200 LBS | SYSTOLIC BLOOD PRESSURE: 126 MMHG | TEMPERATURE: 97.8 F

## 2021-09-16 DIAGNOSIS — G89.4 CHRONIC PAIN SYNDROME: ICD-10-CM

## 2021-09-16 DIAGNOSIS — E78.1 HYPERTRIGLYCERIDEMIA: Primary | ICD-10-CM

## 2021-09-16 PROCEDURE — 1111F DSCHRG MED/CURRENT MED MERGE: CPT | Performed by: NURSE PRACTITIONER

## 2021-09-16 PROCEDURE — 99496 TRANSJ CARE MGMT HIGH F2F 7D: CPT | Performed by: NURSE PRACTITIONER

## 2021-09-16 RX ORDER — PREGABALIN 100 MG/1
100 CAPSULE ORAL 2 TIMES DAILY
Qty: 60 CAPSULE | Refills: 0 | Status: SHIPPED | OUTPATIENT
Start: 2021-09-16 | End: 2021-10-19

## 2021-09-16 NOTE — TELEPHONE ENCOUNTER
I don't think there will be a quick fix to his pain, unfortunately  RFA could potentially provide long term relief if MBB's work  If he would rather return for OV to discuss he can do that

## 2021-09-16 NOTE — PROGRESS NOTES
INTERNAL MEDICINE TRANSITION OF CARE OFFICE VISIT  [facility] AT [facility]    NAME: Richelle George  AGE: 48 y o  SEX: male  : 1975   MRN: [id number]    DATE: 2021  TIME: 4:26 PM    Assessment and Plan     There are no diagnoses linked to this encounter  Problem List Items Addressed This Visit        Other    Hypertriglyceridemia - Primary     Is on statin and fenofibrate  To continue medications  Has follow-up with endocrinology on          Chronic pain syndrome    Relevant Medications    pregabalin (LYRICA) 100 mg capsule         Diagnosis ICD-10-CM Associated Orders   1  Hypertriglyceridemia  E78 1    2  Chronic pain syndrome  G89 4 pregabalin (LYRICA) 100 mg capsule       Return to office in: 3 months    Transitional Care Management Review   Richelle George is a 48 y o  male here for TCM follow-up    During the TCM phone call patient stated:    TCM Call (since 2021)     Date and time call was made  2021  3:02 PM    Hospital care reviewed  Records reviewed    Patient was hospitialized at  [facility]        Date of Admission  21    Date of discharge  21    Disposition  Home      TCM Call (since 2021)     Should patient be enrolled in anticoag monitoring? No    Scheduled for follow up? Yes    Did you obtain your prescribed medications  Yes    Do you need help managing your prescriptions or medications  No    Is transportation to your appointment needed  No    I have advised the patient to call PCP with any new or worsening symptoms  [place] MA           History of Present Illness     Patient presents to office for TCM visit  Hospitalized at [facility] - for abdominal pain  Was found to have triglycerides over 1,000   672 on discharge  Patient on statin and fenofibrate  Has follow-up with endo and GI in the next week or 2  Patient denies abdominal pain, fever, chills, changes in bowels, decreased urine output, n/v/d    Is seeking clearance to return to work  The following portions of the patient's history were reviewed and updated as appropriate: allergies, current medications, past family history, past medical history, past social history, past surgical history and problem list     Review of Systems   Review of Systems   Constitutional: Negative for chills and fever  HENT: Negative for ear pain and sore throat  Eyes: Negative for pain and visual disturbance  Respiratory: Negative for cough and shortness of breath  Cardiovascular: Negative for chest pain and palpitations  Gastrointestinal: Negative for abdominal distention, abdominal pain, constipation, diarrhea, nausea and vomiting  Genitourinary: Negative for decreased urine volume, difficulty urinating, dysuria, flank pain, frequency, hematuria and urgency  Musculoskeletal: Negative for arthralgias, back pain and myalgias  Skin: Negative for color change and rash  Neurological: Negative for dizziness, syncope, weakness, light-headedness, numbness and headaches  Psychiatric/Behavioral: Negative for confusion  The patient is not nervous/anxious          Active Problem List     Patient Active Problem List   Diagnosis    Pancreatic lesion    Renal mass    Encounter for smoking cessation counseling    Leukocytosis    RBBB    Epigastric pain    Generalized abdominal pain    Abnormal transaminases    Acute on chronic pancreatitis (HCC)    Leukopenia    Hypertriglyceridemia    Chronic pain syndrome    Esophagitis    Other chronic pancreatitis (HCC)    Chronic gastritis without bleeding    Uncomplicated opioid dependence ([facility] [address] )    Long-term current use of opiate analgesic    GERD (gastroesophageal reflux disease)    Hyponatremia    Healthcare maintenance    Elevated blood pressure reading    Upper abdominal pain    Hypertension    Pancreatitis, unspecified pancreatitis type    Abdominal pain, epigastric    Change in bowel habits    Musculoskeletal neck pain    Prediabetes       Objective   /78   Pulse 75   Temp 97 8 °F (36 6 °C)   Ht 5' 11" (1 803 m)   Wt 90 7 kg (200 lb)   SpO2 97%   BMI 27 89 kg/m²     Physical Exam  Vitals reviewed  Constitutional:       General: He is not in acute distress  Appearance: Normal appearance  He is not ill-appearing  HENT:      Head: Normocephalic and atraumatic  Right Ear: Tympanic membrane, ear canal and external ear normal       Left Ear: Tympanic membrane, ear canal and external ear normal       Nose: Nose normal       Mouth/Throat:      Mouth: Mucous membranes are moist       Pharynx: Oropharynx is clear  No oropharyngeal exudate or posterior oropharyngeal erythema  Eyes:      Conjunctiva/sclera: Conjunctivae normal       Pupils: Pupils are equal, round, and reactive to light  Cardiovascular:      Rate and Rhythm: Normal rate and regular rhythm  Heart sounds: Normal heart sounds  No murmur heard  Pulmonary:      Effort: Pulmonary effort is normal  No respiratory distress  Breath sounds: Normal breath sounds  Abdominal:      General: Bowel sounds are normal       Palpations: Abdomen is soft  There is no mass  Tenderness: There is no abdominal tenderness  There is no right CVA tenderness or left CVA tenderness  Musculoskeletal:         General: Normal range of motion  Cervical back: Normal range of motion and neck supple  No rigidity or tenderness  Right lower leg: No edema  Left lower leg: No edema  Skin:     General: Skin is warm and dry  Capillary Refill: Capillary refill takes less than 2 seconds  Neurological:      Mental Status: He is alert and oriented to person, place, and time     Psychiatric:         Mood and Affect: Mood normal          Behavior: Behavior normal          Pertinent Laboratory/Diagnostic Studies:  CBC:   Lab Results   Component Value Date/Time    WBC 6 62 09/12/2021 11:19 PM    WBC 5 6 01/11/2017 11:10 AM RBC 4 40 09/12/2021 11:19 PM    RBC 4 55 01/11/2017 11:10 AM    HGB 14 3 09/12/2021 11:19 PM    HGB 13 4 01/11/2017 11:10 AM    HCT 41 9 09/12/2021 11:19 PM    HCT 40 9 01/11/2017 11:10 AM    MCV 95 09/12/2021 11:19 PM    MCV 89 9 01/11/2017 11:10 AM    MCH 32 5 09/12/2021 11:19 PM    MCH 29 5 01/11/2017 11:10 AM    MCHC 34 1 09/12/2021 11:19 PM    MCHC 32 9 01/11/2017 11:10 AM    RDW 12 8 09/12/2021 11:19 PM    RDW 15 2 (H) 01/11/2017 11:10 AM    MPV 10 9 09/12/2021 11:19 PM    MPV 8 8 01/11/2017 11:10 AM     09/12/2021 11:19 PM     01/11/2017 11:10 AM    NRBC 0 09/12/2021 11:19 PM    NEUTOPHILPCT 51 09/12/2021 11:19 PM    LYMPHOPCT 36 09/12/2021 11:19 PM    MONOPCT 10 09/12/2021 11:19 PM    EOSPCT 2 09/12/2021 11:19 PM    BASOPCT 1 09/12/2021 11:19 PM    BASOPCT 0 5 01/11/2017 11:10 AM    NEUTROABS 3 42 09/12/2021 11:19 PM    NEUTROABS 2794 01/11/2017 11:10 AM    NEUTROABS 49 9 01/11/2017 11:10 AM    LYMPHSABS 2 41 09/12/2021 11:19 PM    LYMPHSABS 2206 01/11/2017 11:10 AM    LYMPHSABS 39 4 01/11/2017 11:10 AM    MONOSABS 0 64 09/12/2021 11:19 PM    MONOSABS 325 01/11/2017 11:10 AM    EOSABS 0 10 09/12/2021 11:19 PM    EOSABS 246 01/11/2017 11:10 AM    EOSABS 4 4 01/11/2017 11:10 AM     Chemistry Profile:   Lab Results   Component Value Date/Time     04/26/2017 12:04 PM    K 3 9 09/13/2021 11:08 AM    K 3 7 09/04/2019 10:24 AM     09/13/2021 11:08 AM     09/04/2019 10:24 AM    CO2 24 09/13/2021 11:08 AM    CO2 28 09/04/2019 10:24 AM    BUN 10 09/13/2021 11:08 AM    BUN 5 (L) 09/04/2019 10:24 AM    CREATININE 0 77 09/13/2021 11:08 AM    CREATININE 0 80 04/26/2017 12:04 PM    GLUC 122 09/13/2021 11:08 AM    GLUC 113 (H) 09/04/2019 10:24 AM    GLUF 93 05/09/2018 05:26 AM    CALCIUM 8 1 (L) 09/13/2021 11:08 AM    CALCIUM 9 0 09/04/2019 10:24 AM    CORRECTEDCA 8 6 03/13/2021 04:29 PM    MG 1 9 05/14/2020 05:05 PM    PHOS 4 1 04/14/2019 01:24 AM     (H) 09/12/2021 11:20 PM    AST 241 (H) 09/04/2019 10:24 AM    ALT 88 (H) 09/12/2021 11:20 PM     (H) 09/04/2019 10:24 AM    ALKPHOS 115 09/12/2021 11:20 PM    ALKPHOS 266 (H) 09/04/2019 10:24 AM    PROT 7 3 04/26/2017 12:04 PM    BILITOT 1 8 (H) 04/26/2017 12:04 PM    EGFR 107 09/13/2021 11:08 AM     Coagulation Studies:   Lab Results   Component Value Date/Time    PROTIME 16 0 (H) 05/14/2020 05:05 PM    INR 1 27 (H) 05/14/2020 05:05 PM    PTT 36 05/14/2020 05:05 PM     Cardiac Studies:   Lab Results   Component Value Date/Time    NTBNP 11 08/20/2019 11:45 AM    TROPONINI <0 02 09/12/2021 11:20 PM     Hepatology:   Lab Results   Component Value Date/Time    LIPASE 79 09/12/2021 11:20 PM    LIPASE 15 04/26/2017 12:04 PM     Endocrine Studies:   Lab Results   Component Value Date/Time    HGBA1C 5 3 08/24/2021 11:07 AM    CWP9DINJYEYN 1 141 05/14/2020 05:05 PM    LGJ1OHHKLTLH 1 08 10/10/2016 11:42 AM    TRIG 672 (H) 09/14/2021 05:29 AM    TRIG 3,135 (H) 08/24/2021 11:07 AM    CHOL 114 (L) 04/26/2017 12:04 PM    CHOLESTEROL 171 09/14/2021 05:29 AM    CHOLESTEROL 245 (H) 08/14/2018 10:08 AM    HDL 23 (L) 09/14/2021 05:29 AM    HDL 13 (L) 08/14/2018 10:08 AM    LDLCALC  09/14/2021 05:29 AM      Comment:      Calculated LDL invalid, triglycerides >400 mg/dl  This screening LDL is a calculated result  It does not have the accuracy of the Direct Measured LDL in the monitoring of patients with hyperlipidemia and/or statin therapy  Direct Measure LDL (KST715) must be ordered separately in these patients  [address]  08/14/2018 10:08 AM      Comment:         LDL cholesterol not calculated  Triglyceride levels  greater than 400 mg/dL invalidate calculated LDL results  Reference range: <100     Desirable range <100 mg/dL for primary prevention;    <70 mg/dL for patients with CHD or diabetic patients   with > or = 2 CHD risk factors       LDL-C is now calculated using the Gavin-Georges   calculation, which is a validated novel method providing better accuracy than the Friedewald equation in the   estimation of LDL-C  Holly Hassan  Lois Clement  4642;917(03): 0072-2142   (http://Wipebook/faq/UXC210)      LDLDIRECT 24 10/10/2016 11:42 AM    NONHDLC 148 09/14/2021 05:29 AM    NONHDLC 86 04/26/2017 12:04 PM     Iron Studies:   Lab Results   Component Value Date/Time    LABIRON 92 (H) 10/10/2016 11:42 AM    IRON 141 03/04/2021 10:51 AM    IRON 288 (H) 10/10/2016 11:42 AM    TIBC 347 03/04/2021 10:51 AM    TIBC 314 10/10/2016 11:42 AM    FERRITIN 873 (H) 03/04/2021 10:51 AM    FERRITIN 2202 (H) 10/10/2016 11:42 AM     Health Maintenance:   Lab Results   Component Value Date/Time    HEPCAB Non-reactive 04/18/2018 09:58 AM    HEPCAB NON-REACTIVE 10/10/2016 11:42 AM     Toxicology:   Lab Results   Component Value Date/Time    ETOH <3 04/18/2018 09:58 AM    ACTMNPHEN <2 0 (L) 48/85/2742 24:41 AM    SALICYLATE <3 (L) 40/18/0054 09:58 AM     Urine Protein/Creatinine Ratio: No results found for: CREATININEUR, PROTUR, UTPCR, LABMICR, MICROALBCRE, MICROCREAT  Urinalysis:   Lab Results   Component Value Date/Time    COLORU Yellow 09/13/2021 03:09 AM    COLORU YELLOW 09/15/2016 10:00 AM    CLARITYU Clear 09/13/2021 03:09 AM    SPECGRAV 1 010 09/13/2021 03:09 AM    SPECGRAV 1 007 09/15/2016 10:00 AM    PHUR 5 0 09/13/2021 03:09 AM    PHUR 6 5 09/11/2018 06:10 PM    PHUR 7 0 09/15/2016 10:00 AM    LEUKOCYTESUR Negative 09/13/2021 03:09 AM    LEUKOCYTESUR NEGATIVE 09/15/2016 10:00 AM    NITRITE Negative 09/13/2021 03:09 AM    NITRITE NEGATIVE 09/15/2016 10:00 AM    PROTEINUA NEGATIVE 09/15/2016 10:00 AM    GLUCOSEU Negative 09/13/2021 03:09 AM    KETONESU Negative 09/13/2021 03:09 AM    KETONESU NEGATIVE 09/15/2016 10:00 AM    BILIRUBINUR Negative 09/13/2021 03:09 AM    BILIRUBINUR NEGATIVE 09/15/2016 10:00 AM    BLOODU Negative 09/13/2021 03:09 AM      Urine Micro:   Lab Results   Component Value Date/Time    RBCUA None Seen 09/11/2018 06:10 PM    WBCUA 0-1 (A) 09/11/2018 06:10 PM    EPIS Occasional 09/11/2018 06:10 PM    BACTERIA Occasional 09/11/2018 06:10 PM     Urine Dip: No components found for: SLAMBCOLORUA, SLAMBCLAIRTY, SLAMBSG, SLAMBPHUA, SLAMBLEUKOUA, [address] [facility], [facility], [address], [address], [address]  Hematology:   Lab Results   Component Value Date/Time    FOLATE >20 0 (H) 09/08/2019 04:34 AM    [id number] 770 09/08/2019 04:34 AM     04/19/2018 06:08 AM     ID Studies:   Lab Results   Component Value Date/Time    LACTICACID 1 1 09/12/2021 11:19 PM    HEPBSAG Non-reactive 04/18/2018 09:58 AM    HEPBSAG NON-REACTIVE 10/10/2016 11:42 AM    HEPAIGM Non-reactive 04/18/2018 09:58 AM    HEPCAB Non-reactive 04/18/2018 09:58 AM    HEPCAB NON-REACTIVE 10/10/2016 11:42 AM    HEPBIGM Non-reactive 04/18/2018 09:58 AM    HEPBCAB NON-REACTIVE 10/10/2016 11:42 AM    EBVVCAIGM < OR = 0 90 10/10/2016 11:42 AM    EBVVCAIGG 4 98 (H) 10/10/2016 11:42 AM     Cultures:   Lab Results   Component Value Date/Time    BLOODCX No Growth After 5 Days  08/20/2019 11:45 AM    BLOODCX No Growth After 5 Days   08/20/2019 11:45 AM      Rheumatologic Studies:   Lab Results   Component Value Date/Time    CKTOTAL 126 04/18/2018 09:58 AM    DEANNA Negative 04/19/2018 06:08 AM    DEANNA NEGATIVE 10/10/2016 11:42 AM    AGRATIO 1 5 09/04/2019 10:24 AM    GAMMAGLOB 2 2 04/26/2017 12:04 PM     04/19/2018 06:08 AM    HEPBSAG Non-reactive 04/18/2018 09:58 AM    HEPBSAG NON-REACTIVE 10/10/2016 11:42 AM    HEPAIGM Non-reactive 04/18/2018 09:58 AM    HEPCAB Non-reactive 04/18/2018 09:58 AM    HEPCAB NON-REACTIVE 10/10/2016 11:42 AM    HEPBIGM Non-reactive 04/18/2018 09:58 AM         Current Medications     Current Outpatient Medications:     albuterol (PROVENTIL HFA,VENTOLIN HFA) 90 mcg/act inhaler, INHALE TWO PUFFS BY MOUTH EVERY 6 HOURS AS NEEDED FOR WHEEZING, Disp: 54 g, Rfl: 0    amLODIPine (NORVASC) 5 mg tablet, TAKE ONE TABLET BY MOUTH EVERY DAY , Disp: 30 tablet, Rfl: 0    Choline Fenofibrate (Fenofibric Acid) 135 MG CPDR, Take 1 capsule (135 mg total) by mouth daily, Disp: 90 capsule, Rfl: 2    esomeprazole (NexIUM) 40 MG capsule, TAKE ONE CAPSULE BY MOUTH TWICE DAILY BEFORE MEALS , Disp: 60 capsule, Rfl: 0    famotidine (PEPCID) 40 MG tablet, Take 40 mg by mouth 2 (two) times a day, Disp: , Rfl:     insulin degludec Samira Annas FlexTouch) 100 units/mL injection pen, 20 units subcut in hs, Disp: , Rfl:     lisinopril (ZESTRIL) 20 mg tablet, TAKE ONE TABLET BY MOUTH EVERY DAY , Disp: 30 tablet, Rfl: 0    ondansetron (ZOFRAN-ODT) 4 mg disintegrating tablet, DISSOLVE ONE TABLET IN MOUTH EVERY EIGHT HOURS AS NEEDED NAUSEA OR VOMITING , Disp: 30 tablet, Rfl: 0    pancrelipase, Lip-Prot-Amyl, (Creon) 12,000 units capsule, Take 12,000 units of lipase by mouth as needed for snacks, Disp: 90 capsule, Rfl: 2    Pancrelipase, Lip-Prot-Amyl, (Creon) 05954 units CPEP, Take 1 capsule (36,000 Units total) by mouth 3 (three) times a day before meals, Disp: 90 capsule, Rfl: 3    pregabalin (LYRICA) 100 mg capsule, Take 1 capsule (100 mg total) by mouth 2 (two) times a day, Disp: 60 capsule, Rfl: 0    rosuvastatin (CRESTOR) 40 MG tablet, TAKE ONE TABLET BY MOUTH ONCE A DAY , Disp: 90 tablet, Rfl: 0    traZODone (DESYREL) 50 mg tablet, TAKE ONE TABLET BY MOUTH NIGHTLY AT BEDTIME , Disp: 30 tablet, Rfl: 0    Patel Larios[address]

## 2021-09-16 NOTE — LETTER
September 16, 2021     Patient: Anastasiia East   YOB: 1975   Date of Visit: 9/16/2021       To Whom it May Concern:    Anastasiia East is under my professional care  He was seen in my office on 9/16/2021  He may return to work on 9/20/2021  If you have any questions or concerns, please don't hesitate to call           Sincerely,          LUANA Ribeiro        CC: No Recipients

## 2021-09-16 NOTE — PATIENT INSTRUCTIONS
Low Fat Diet   AMBULATORY CARE:   A low-fat diet  is an eating plan that is low in total fat, unhealthy fat, and cholesterol  You may need to follow a low-fat diet if you have trouble digesting or absorbing fat  You may also need to follow this diet if you have high cholesterol  You can also lower your cholesterol by increasing the amount of fiber in your diet  Soluble fiber is a type of fiber that helps to decrease cholesterol levels  Different types of fat in food:   · Limit unhealthy fats  A diet that is high in cholesterol, saturated fat, and trans fat may cause unhealthy cholesterol levels  Unhealthy cholesterol levels increase your risk of heart disease  ? Cholesterol:  Limit intake of cholesterol to less than 200 mg per day  Cholesterol is found in meat, eggs, and dairy  ? Saturated fat:  Limit saturated fat to less than 7% of your total daily calories  Ask your dietitian how many calories you need each day  Saturated fat is found in butter, cheese, ice cream, whole milk, and palm oil  Saturated fat is also found in meat, such as beef, pork, chicken skin, and processed meats  Processed meats include sausage, hot dogs, and bologna  ? Trans fat:  Avoid trans fat as much as possible  Trans fat is used in fried and baked foods  Foods that say trans fat free on the label may still have up to 0 5 grams of trans fat per serving  · Include healthy fats  Replace foods that are high in saturated and trans fat with foods high in healthy fats  This may help to decrease high cholesterol levels  ? Monounsaturated fats: These are found in avocados, nuts, and vegetable oils, such as olive, canola, and sunflower oil  ? Polyunsaturated fats: These can be found in vegetable oils, such as soybean or corn oil  Omega-3 fats can help to decrease the risk of heart disease  Omega-3 fats are found in fish, such as salmon, herring, trout, and tuna   Omega-3 fats can also be found in plant foods, such as walnuts, flaxseed, soybeans, and canola oil  Foods to limit or avoid:   · Grains:      ? Snacks that are made with partially hydrogenated oils, such as chips, regular crackers, and butter-flavored popcorn    ? High-fat baked goods, such as biscuits, croissants, doughnuts, pies, cookies, and pastries    · Dairy:      ? Whole milk, 2% milk, and yogurt and ice cream made with whole milk    ? Half and half creamer, heavy cream, and whipping cream    ? Cheese, cream cheese, and sour cream    · Meats and proteins:      ? High-fat cuts of meat (T-bone steak, regular hamburger, and ribs)    ? Fried meat, poultry (turkey and chicken), and fish    ? Poultry (chicken and turkey) with skin    ? Cold cuts (salami or bologna), hot dogs, llanos, and sausage    ? Whole eggs and egg yolks    · Vegetables and fruits with added fat:      ? Fried vegetables or vegetables in butter or high-fat sauces, such as cream or cheese sauces    ? Fried fruit or fruit served with butter or cream    · Fats:      ? Butter, stick margarine, and shortening    ? Coconut, palm oil, and palm kernel oil    Foods to include:   · Grains:      ? Whole-grain breads, cereals, pasta, and brown rice    ? Low-fat crackers and pretzels    · Vegetables and fruits:      ? Fresh, frozen, or canned vegetables (no salt or low-sodium)    ? Fresh, frozen, dried, or canned fruit (canned in light syrup or fruit juice)    ? Avocado    · Low-fat dairy products:      ? Nonfat (skim) or 1% milk    ? Nonfat or low-fat cheese, yogurt, and cottage cheese    · Meats and proteins:      ? Chicken or turkey with no skin    ? Baked or broiled fish    ? Lean beef and pork (loin, round, extra lean hamburger)    ? Beans and peas, unsalted nuts, soy products    ? Egg whites and substitutes    ? Seeds and nuts    · Fats:      ? Unsaturated oil, such as canola, olive, peanut, soybean, or sunflower oil    ? Soft or liquid margarine and vegetable oil spread    ?  Low-fat salad dressing    Other ways to decrease fat:   · Read food labels before you buy foods  Choose foods that have less than 30% of calories from fat  Choose low-fat or fat-free dairy products  Remember that fat free does not mean calorie free  These foods still contain calories, and too many calories can lead to weight gain  · Trim fat from meat and avoid fried food  Trim all visible fat from meat before you cook it  Remove the skin from poultry  Do not springer meat, fish, or poultry  Bake, roast, boil, or broil these foods instead  Avoid fried foods  Eat a baked potato instead of Western [place]ie fries  Steam vegetables instead of sautéing them in butter  · Add less fat to foods  Use imitation llanos bits on salads and baked potatoes instead of regular llanos bits  Use fat-free or low-fat salad dressings instead of regular dressings  Use low-fat or nonfat butter-flavored topping instead of regular butter or margarine on popcorn and other foods  Ways to decrease fat in recipes:  Replace high-fat ingredients with low-fat or nonfat ones  This may cause baked goods to be drier than usual  You may need to use nonfat cooking spray on pans to prevent food from sticking  You also may need to change the amount of other ingredients, such as water, in the recipe  Try the following:  · Use low-fat or light margarine instead of regular margarine or shortening  · Use lean ground turkey breast or chicken, or lean ground beef (less than 5% fat) instead of hamburger  · Add 1 teaspoon of canola oil to 8 ounces of skim milk instead of using cream or half and half  · Use grated zucchini, carrots, or apples in breads instead of coconut  · Use blenderized, low-fat cottage cheese, plain tofu, or low-fat ricotta cheese instead of cream cheese  · Use 1 egg white and 1 teaspoon of canola oil, or use ¼ cup (2 ounces) of fat-free egg substitute instead of a whole egg       · Replace half of the oil that is called for in a recipe with applesauce when you bake  Use 3 tablespoons of cocoa powder and 1 tablespoon of canola oil instead of a square of baking chocolate  How to increase fiber:  Eat enough high-fiber foods to get 20 to 30 grams of fiber every day  Slowly increase your fiber intake to avoid stomach cramps, gas, and other problems  · Eat 3 ounces of whole-grain foods each day  An ounce is about 1 slice of bread  Eat whole-grain breads, such as whole-wheat bread  Whole wheat, whole-wheat flour, or other whole grains should be listed as the first ingredient on the food label  Replace white flour with whole-grain flour or use half of each in recipes  Whole-grain flour is heavier than white flour, so you may have to add more yeast or baking powder  · Eat a high-fiber cereal for breakfast   Oatmeal is a good source of soluble fiber  Look for cereals that have bran or fiber in the name  Choose whole-grain products, such as brown rice, barley, and whole-wheat pasta  · Eat more beans, peas, and lentils  For example, add beans to soups or salads  Eat at least 5 cups of fruits and vegetables each day  Eat fruits and vegetables with the peel because the peel is high in fiber  © Copyright [employer] 2021 Information is for End User's use only and may not be sold, redistributed or otherwise used for commercial purposes  All illustrations and images included in CareNotes® are the copyrighted property of A D A M , Inc  or [facility]   The above information is an  only  It is not intended as medical advice for individual conditions or treatments  Talk to your doctor, nurse or pharmacist before following any medical regimen to see if it is safe and effective for you

## 2021-09-17 RX ORDER — EVOLOCUMAB 420 MG/3.5
420 KIT SUBCUTANEOUS
COMMUNITY
Start: 2021-04-29 | End: 2022-05-19 | Stop reason: ALTCHOICE

## 2021-09-17 RX ORDER — BLOOD SUGAR DIAGNOSTIC
STRIP MISCELLANEOUS
COMMUNITY
Start: 2021-04-29

## 2021-09-27 ENCOUNTER — OFFICE VISIT (OUTPATIENT)
Dept: PAIN MEDICINE | Facility: CLINIC | Age: 46
End: 2021-09-27
Payer: COMMERCIAL

## 2021-09-27 VITALS
RESPIRATION RATE: 18 BRPM | SYSTOLIC BLOOD PRESSURE: 129 MMHG | DIASTOLIC BLOOD PRESSURE: 86 MMHG | WEIGHT: 203.2 LBS | HEIGHT: 71 IN | HEART RATE: 84 BPM | BODY MASS INDEX: 28.45 KG/M2

## 2021-09-27 DIAGNOSIS — M54.50 CHRONIC BILATERAL LOW BACK PAIN WITHOUT SCIATICA: ICD-10-CM

## 2021-09-27 DIAGNOSIS — M79.18 MYOFASCIAL PAIN SYNDROME: Primary | ICD-10-CM

## 2021-09-27 DIAGNOSIS — M47.812 CERVICAL SPONDYLOSIS: ICD-10-CM

## 2021-09-27 DIAGNOSIS — G89.29 CHRONIC BILATERAL LOW BACK PAIN WITHOUT SCIATICA: ICD-10-CM

## 2021-09-27 PROCEDURE — 99214 OFFICE O/P EST MOD 30 MIN: CPT | Performed by: STUDENT IN AN ORGANIZED HEALTH CARE EDUCATION/TRAINING PROGRAM

## 2021-09-27 PROCEDURE — 1111F DSCHRG MED/CURRENT MED MERGE: CPT | Performed by: STUDENT IN AN ORGANIZED HEALTH CARE EDUCATION/TRAINING PROGRAM

## 2021-09-27 RX ORDER — TRAMADOL HYDROCHLORIDE 50 MG/1
50 TABLET ORAL EVERY 8 HOURS PRN
Qty: 25 TABLET | Refills: 0 | Status: SHIPPED | OUTPATIENT
Start: 2021-09-27 | End: 2022-05-19 | Stop reason: ALTCHOICE

## 2021-09-27 RX ORDER — ERGOCALCIFEROL (VITAMIN D2) 1250 MCG
50000 CAPSULE ORAL
COMMUNITY
Start: 2021-09-20 | End: 2022-07-21

## 2021-09-27 NOTE — PATIENT INSTRUCTIONS
Trigger Point Injection   AMBULATORY CARE:   A trigger point injection  is used to relax a muscle knot  This helps relieve pain  You may be able to have more than one trigger point treated during a session  How to prepare for a trigger point injection:   · Your healthcare provider will tell you how to prepare  Arrange to have someone drive you home after the injection  · Tell your provider about all medicines you take, including pain medicine, blood thinners, and muscle relaxers  He or she will tell you if you need to stop any medicine for the injection, and when to stop  He or she will tell you which medicines to take or not take on the day of the injection  · Tell your provider about all your allergies, including to any pain medicine  What will happen during a trigger point injection:   · You may be sitting or lying, depending on where the trigger point is located  Your healthcare provider will feel for a knot in the muscle  He or she may wally your skin over the knot  · Your provider will put a needle through your skin and into the trigger point  Saline (salt solution), pain relievers, or other medicines may be pushed through the needle into the trigger point  Your provider may use only a dry needle (no medicine)  He or she will pull the needle almost all the way out and then push it in again  He or she will repeat this several times until the muscle stops twitching or feels relaxed  · Your provider will remove the needle and stretch the muscle area  He or she may apply pressure to the area for 2 minutes  A bandage will be put over the injection site to prevent bleeding or an infection  What to expect after a trigger point injection:   · You may feel pain relief right away  It is normal for some pain to start again 2 hours later  An ice pack or over-the-counter pain medicine can help lower the pain  · You may feel sore in the injection site for a few days   If you need another injection in the same area, wait until the area is not sore  · Your healthcare provider may give you specific activity instructions to follow at home or recommend physical therapy  In general, you should try to stay active  Avoid strenuous activity for the first 3 or 4 days after the injection  · Do not have more injections if you still have trigger point pain after 2 or 3 injections  Risks of a trigger point injection:  You may have a severe allergic reaction to pain medicine injected  The injection may be painful, or you may be sore where you got the injection  You may bleed, bruise, or develop an infection in the injection area  The injection may cause you to feel faint  Rarely, the needle may cause muscle or blood vessel damage or your lung may collapse if you get the injection near your chest   Call your local emergency number (911 in the [address] [place] [address],[address]), or have someone call if:   · Your mouth and throat are swollen  · You are wheezing or have trouble breathing  · You have chest pain or your heart is beating faster than usual     · You feel like you are going to faint  When should I seek immediate care? · Your face is red or swollen  · You have hives that spread over your body  · You feel weak or dizzy  Call your doctor or pain specialist if:   · You have a fever within 1 week of the injection  · You have redness or swelling within 1 week of the injection  · You have new or worsening pain near the injection site  · You have questions or concerns about your condition or care  Self-care:   · Stay active after you have trigger point injections  Gently move your joints through their full range of motion during the first week  Avoid strenuous activity for 3 or 4 days  · Do regular stretches of the trigger point muscle  Place gentle pressure on the trigger point, and then stretch the muscle  Ask your healthcare provider for more information about how to stretch and apply pressure      · Apply ice to the injection site  Use an ice pack, or put ice in a plastic bag  Cover the bag with a towel before you apply it  Apply ice for 15 to 20 minutes every hour, or as directed  · Apply heat to trigger point sites  Heat can help relax muscles and relieve trigger point pain  Use a heat pack or a heating pad set on low  Apply heat for 15 minutes every hour, or as directed  Follow up with your doctor or pain specialist as directed:  Write down your questions so you remember to ask them during your visits  © Copyright [employer] 2021 Information is for End User's use only and may not be sold, redistributed or otherwise used for commercial purposes  All illustrations and images included in CareNotes® are the copyrighted property of A D A M , Inc  or [facility]  The above information is an  only  It is not intended as medical advice for individual conditions or treatments  Talk to your doctor, nurse or pharmacist before following any medical regimen to see if it is safe and effective for you

## 2021-09-27 NOTE — PROGRESS NOTES
Pain Medicine Follow-Up Note    Assessment:  1  Myofascial pain syndrome    2  Cervical spondylosis    3  Chronic bilateral low back pain without sciatica        Plan:  Orders Placed This Encounter   Procedures    FL spine and pain procedure     Standing Status:   Future     Standing Expiration Date:   9/27/2025     Order Specific Question:   Reason for Exam:     Answer:   TPI USGI     Order Specific Question:   Anticoagulant hold needed? Answer:   No       New Medications Ordered This Visit   Medications    ergocalciferol (ERGOCALCIFEROL) 1 25 MG (41968 UT) capsule     Sig: Take 50,000 Units by mouth    traMADol (ULTRAM) 50 mg tablet     Sig: Take 1 tablet (50 mg total) by mouth every 8 (eight) hours as needed for severe pain     Dispense:  25 tablet     Refill:  0       My impressions and treatment recommendations were discussed in detail with the patient who verbalized understanding and had no further questions  This is a 40-year-old male who returns to our office with chief complaint of neck pain  It is mostly in the lower cervical region  Has evidence of cervical spondylosis  Unfortunately has failed diagnostic MBB on the right side  Therefore will not proceed with this right now  Does have notable myofascial pain in bilateral lower trapezius musculature and in the right lower paraspinous musculature  He may benefit from trigger point injection which was discussed  Unfortunate, patient is relatively contraindicated to NSAIDs due to GI comorbidities  He is also on Lyrica 100 mg b i d   Also has tried oral steroids  All without significant relief  He continues to work and drives long distances  Given lack of improvement with conservative medications, will give him short course of low-dose tramadol 50 mg t i d  p r n     Advised him to take the medication after driving the 1st time to ensure that he does not have any increased drowsiness    He may take the medication 3 times a day as needed  Patient is notably on trazodone which I confirmed he only uses very sparingly and only half a tablet when he does use it  Therefore, it is safe to prescribed tramadol along with this medication  Patient does wish to stay away from opioids for the long-term, which I agree with  The goal is to use the medication as sparingly as possible  [place] Prescription Drug Monitoring Program report was reviewed and was appropriate     There are risks associated with opioid medications, including dependence, addiction and tolerance  The patient understands and agrees to use these medications only as prescribed  Potential side effects of the medications include, but are not limited to, constipation, drowsiness, addiction, impaired judgment and risk of fatal overdose if not taken as prescribed  The patient was warned against driving while taking sedation medications  Sharing medications is a felony  At this point in time, the patient is showing no signs of addiction, abuse, diversion or suicidal ideation  Complete risks and benefits including bleeding, infection, tissue reaction, nerve injury and allergic reaction were discussed  The approach was demonstrated using models and literature was provided  Verbal and written consent was obtained  Discharge instructions were provided  I personally saw and examined the patient and I agree with the above discussed plan of care  History of Present Illness:    Anastasiia East is a 48 y o  male who presents to [facility] for interval re-evaluation of the above stated pain complaints  The patient has a past medical and chronic pain history as outlined in the assessment section  He was last seen on 08/24/2021 for diagnostic right C3-C6 medial branch block with only 15 minutes of improvement from the procedure  Since his last visit, he also had hospitalization for abdominal pain and hypertriglyceridemia    Has EMG scheduled on 10/07/2021  Karen Wolff Also had MRI of the lumbar spine showing mild degenerative changes with small disc protrusions  No significant compressive pathology  However continues to complain of bilateral low back pain  Continues to see a specialist in regards to his chronic pancreatitis  Also continues on Lyrica 100 mg b i d  through PCP  Reports that neck pain is getting worse  There no laterality to the symptoms  Reports pain is worse when pushing heavy objects  Points to the midline C7 region as the main source of his symptoms  Reports also pain with bouncing up and down while driving  Other than as stated above, the patient denies any interval changes in medications, medical condition, mental condition, symptoms, or allergies since the last office visit  Review of Systems:    Review of Systems   Respiratory: Negative for shortness of breath  Cardiovascular: Negative for chest pain  Gastrointestinal: Negative for constipation, diarrhea, nausea and vomiting  Musculoskeletal: Negative for arthralgias, gait problem, joint swelling and myalgias  Skin: Negative for rash  Neurological: Negative for dizziness, seizures and weakness  All other systems reviewed and are negative          Patient Active Problem List   Diagnosis    Pancreatic lesion    Renal mass    Encounter for smoking cessation counseling    Leukocytosis    RBBB    Epigastric pain    Generalized abdominal pain    Abnormal transaminases    Acute on chronic pancreatitis (HCC)    Leukopenia    Hypertriglyceridemia    Chronic pain syndrome    Esophagitis    Other chronic pancreatitis (HCC)    Chronic gastritis without bleeding    Uncomplicated opioid dependence ([address] )    Long-term current use of opiate analgesic    GERD (gastroesophageal reflux disease)    Hyponatremia    Healthcare maintenance    Elevated blood pressure reading    Upper abdominal pain    Hypertension    Pancreatitis, unspecified pancreatitis type  Abdominal pain, epigastric    Change in bowel habits    Musculoskeletal neck pain    Prediabetes       Past Medical History:   Diagnosis Date    Asthma     Chronic pain disorder     Diabetes mellitus (HCC)     GERD (gastroesophageal reflux disease)     Hyperlipidemia     Liver abscess     Meniscus tear     left knee work injury last assessed 08/24/2016    Pancreatitis     Pneumonia        Past Surgical History:   Procedure Laterality Date    CELIAC PLEXUS BLOCK Bilateral 10/29/2018    Procedure: SPLANCHNIC NERVE BLOCK;  Surgeon: Lizzette Castro MD;  [facility];  Service: Pain Management     CELIAC PLEXUS BLOCK Bilateral 1/10/2019    Procedure: SPLANCHNIC NERVE BLOCK;  Surgeon: Lizzette Castro MD;  [facility];  Service: Pain Management     CHOLECYSTECTOMY      ESOPHAGOGASTRODUODENOSCOPY N/A 11/16/2017    Procedure: ESOPHAGOGASTRODUODENOSCOPY (EGD); Surgeon: Michael Rincon MD;  [facility]; Service: Gastroenterology    ESOPHAGOGASTRODUODENOSCOPY N/A 4/19/2018    Procedure: ESOPHAGOGASTRODUODENOSCOPY (EGD); Surgeon: Tod Longo MD;  [facility]; Service: Gastroenterology    ESOPHAGOGASTRODUODENOSCOPY N/A 5/10/2018    Procedure: ESOPHAGOGASTRODUODENOSCOPY (EGD); Surgeon: Sonia Phillip MD;  [facility];   Service: Gastroenterology    IR TEMPORARY DIALYSIS CATHETER PLACEMENT  2/28/2019    KNEE ARTHROSCOPY Bilateral     KNEE ARTHROSCOPY Right 2009    cibishino last assessed 08/24/2016    KNEE ARTHROSCOPY Right 07/01/2019    LIVER SURGERY      NERVE BLOCK Bilateral 5/29/2018    Procedure: SPLANCHNIC NERVE BLOCK;  Surgeon: Lizzette Castro MD;  [facility];  Service: Pain Management     PANCREAS SURGERY      stents    PANCREATIC CYST EXCISION      IA INJECT NERV KAICK,CELIAC PLEXUS Bilateral 5/9/2017    Procedure: CELIAC PLEXUS BLOCK ;  Surgeon: Lizzette Castro MD;  [facility];  Service: Pain Management     IA INJECT NERV BLCK,CELIAC PLEXUS Bilateral 2017    Procedure: SPLANCHNIC NERVE BLOCK at T12;  Surgeon: Jolynn Watts MD;  [facility];  Service: Pain Management     NV INJECT NERV BLCK,CELIAC PLEXUS Bilateral 2017    Procedure: BILATERAL SPLANCHNIC NERVE BLOCK T12;  Surgeon: Jolynn Watts MD;  [facility];  Service: Pain Management     NV INJECT NERV BLCK,CELIAC PLEXUS Bilateral 2019    Procedure: BLOCK / INJECTION CELIAC PLEXUS;  Surgeon: Jolynn Watts MD;  [facility];  Service: Pain Management     NV INJECT NERV BLCK,CELIAC PLEXUS Bilateral 2019    Procedure: SPLANCHNIC NERVE BLOCK;  Surgeon: Jolynn Watts MD;  [facility];  Service: Pain Management     NV INJECT NERV BLCK,CELIAC PLEXUS Bilateral 10/17/2019    Procedure: SPLANCHNIC NERVE BLOCK;  Surgeon: Jolynn Watts MD;  [facility];  Service: Pain Management     NV INJECT NERV Lyndy Ates Bilateral 2017    Procedure: SPLANCHNIC NERVE BLOCK;  Surgeon: Jolynn Watts MD;  [facility];  Service: Pain Management     NV LAP,CHOLECYSTECTOMY N/A 2017    Procedure: LAPAROSCOPIC CHOLECYSTECTOMY, IOC, POSSIBLE OPEN ;  Surgeon: Oseas Rivera MD;  [facility];  Service: General    ROTATOR CUFF REPAIR Right     SHOULDER ARTHROSCOPY      SHOULDER ARTHROSCOPY Right        Family History   Problem Relation Age of Onset    Cirrhosis Mother     Heart disease Other         cardiac disorder    Cancer Other        Social History     Occupational History    Occupation: fulltime employment   Tobacco Use    Smoking status: Former Smoker     Packs/day: 1 00     Years: 20 00     Pack years: 20 00     Quit date: 3/15/2021     Years since quittin 5    Smokeless tobacco: Never Used   Vaping Use    Vaping Use: Never used   Substance and Sexual Activity    Alcohol use: Not Currently     Alcohol/week: 10 0 standard drinks     Types: 10 Cans of beer per week     Comment: quit  Drug use: No    Sexual activity: Yes     Partners: Female         Current Outpatient Medications:     albuterol (PROVENTIL HFA,VENTOLIN HFA) 90 mcg/act inhaler, INHALE TWO PUFFS BY MOUTH EVERY 6 HOURS AS NEEDED FOR WHEEZING, Disp: 54 g, Rfl: 0    amLODIPine (NORVASC) 5 mg tablet, TAKE ONE TABLET BY MOUTH EVERY DAY , Disp: 30 tablet, Rfl: 0    Choline Fenofibrate (Fenofibric Acid) 135 MG CPDR, Take 1 capsule (135 mg total) by mouth daily, Disp: 90 capsule, Rfl: 2    ergocalciferol (ERGOCALCIFEROL) 1 25 MG (32471 UT) capsule, Take 50,000 Units by mouth, Disp: , Rfl:     esomeprazole (NexIUM) 40 MG capsule, TAKE ONE CAPSULE BY MOUTH TWICE DAILY before meals, Disp: 60 capsule, Rfl: 5    famotidine (PEPCID) 40 MG tablet, Take 40 mg by mouth 2 (two) times a day, Disp: , Rfl:     insulin degludec Marguerite Arthur FlexTouch) 100 units/mL injection pen, 20 units subcut in hs, Disp: , Rfl:     Insulin Pen Needle (Pen Needles) 32G X 5 MM MISC, use once daily as directed, Disp: , Rfl:     lisinopril (ZESTRIL) 20 mg tablet, TAKE ONE TABLET BY MOUTH EVERY DAY , Disp: 30 tablet, Rfl: 0    ondansetron (ZOFRAN-ODT) 4 mg disintegrating tablet, DISSOLVE ONE TABLET IN MOUTH EVERY EIGHT HOURS AS NEEDED NAUSEA OR VOMITING , Disp: 30 tablet, Rfl: 0    pancrelipase, Lip-Prot-Amyl, (Creon) 12,000 units capsule, Take 12,000 units of lipase by mouth as needed for snacks, Disp: 90 capsule, Rfl: 2    Pancrelipase, Lip-Prot-Amyl, (Creon) 15418 units CPEP, Take 1 capsule (36,000 Units total) by mouth 3 (three) times a day before meals, Disp: 90 capsule, Rfl: 3    pregabalin (LYRICA) 100 mg capsule, Take 1 capsule (100 mg total) by mouth 2 (two) times a day, Disp: 60 capsule, Rfl: 0    rosuvastatin (CRESTOR) 40 MG tablet, TAKE ONE TABLET BY MOUTH ONCE A DAY , Disp: 90 tablet, Rfl: 0    traZODone (DESYREL) 50 mg tablet, TAKE ONE TABLET BY MOUTH NIGHTLY AT BEDTIME , Disp: 30 tablet, Rfl: 0    Evolocumab with Infusor (Repatha Pushtronex System) 420 MG/3 5ML SOCT, Inject 420 mg under the skin every 28 days, Disp: , Rfl:     Lidocaine-Menthol 4-1 % PTCH, NuLido Patch 4 %-1 % topical  Apply to affected area twice a day  (Patient not taking: Reported on 9/27/2021), Disp: , Rfl:     traMADol (ULTRAM) 50 mg tablet, Take 1 tablet (50 mg total) by mouth every 8 (eight) hours as needed for severe pain, Disp: 25 tablet, Rfl: 0    Allergies   Allergen Reactions    Bee Venom Swelling       Physical Exam:    /86   Pulse 84   Resp 18   Ht 5' 11" (1 803 m)   Wt 92 2 kg (203 lb 3 2 oz)   BMI 28 34 kg/m²     Constitutional:normal, well developed, well nourished, alert, in no distress and non-toxic and no overt pain behavior  Eyes:anicteric  HEENT:grossly intact  Neck:supple, symmetric, trachea midline and no masses   Pulmonary:even and unlabored  Cardiovascular:No edema or pitting edema present  Skin:Normal without rashes or lesions and well hydrated  Psychiatric:Mood and affect appropriate  Neurologic:Cranial Nerves II-XII grossly intact  Musculoskeletal:  Tender to palpation over the bilateral cervical paraspinous musculature in the lower cervical spine  Imaging  FL spine and pain procedure    (Results Pending)     MRI CERVICAL SPINE WITHOUT CONTRAST     INDICATION: Evaluate for possible herniated disc  Patient has trapezius muscle spasm and spasm of the rhomboid muscle      COMPARISON:  None      TECHNIQUE:  Sagittal T1, sagittal T2, sagittal inversion recovery, axial T2, axial  2D merge  Imaging performed on 1 5T MRI    IMAGE QUALITY:  Diagnostic     FINDINGS:     ALIGNMENT:  Straightening of the cervical spine without subluxation  Vertebral body stature is preserved      MARROW SIGNAL:  Endplate degenerative changes with mixed type I and type II Modic signal noted at C5-C6    Mixed type II and type III Modic degenerative changes noted at C6-C7      CERVICAL AND VISUALIZED THORACIC CORD:  Normal signal within the visualized cord      PREVERTEBRAL AND PARASPINAL SOFT TISSUES:  Normal      VISUALIZED POSTERIOR FOSSA:  The visualized posterior fossa demonstrates no abnormal signal      CERVICAL DISC SPACES:  Disc space narrowing at C5-C6 and C6-C7      C2-C3:  Moderate right facet arthropathy and hypertrophy with mild perifacet soft tissue edema  Correlate for active facet arthropathy  No spinal canal or foraminal stenosis      C3-C4:  Shallow central protrusion and left facet arthropathy with moderate to severe left foraminal stenosis  Mild spinal stenosis  Patent right neural foramina      C4-C5:  Left facet arthropathy with mild left foraminal stenosis      C5-C6:  Posterior disc osteophyte complex with right greater than left uncovertebral hypertrophy and suggestion of a right foraminal disc osteophyte complex resulting in moderate right foraminal stenosis  Patent spinal canal and left neural foramina      C6-C7:  Broad posterior protrusion with mild ventral indentation on the thecal sac and no greater than mild spinal stenosis  Patent neural foramina      C7-T1:  Normal      UPPER THORACIC DISC SPACES:  Normal      IMPRESSION:     Cervical spondylosis with mild spinal stenosis and variable degrees of facet arthropathy and foraminal narrowing as described above  Please see above level by level discussion      Facet arthropathy is most pronounced on the right at C2-C3 where there is mild perifacet soft tissue edema  Correlate for active facet arthropathy at this level        MRI LUMBAR SPINE WITHOUT CONTRAST     08/24/2021     INDICATION: M54 5: Low back pain  Chronic low back pain radiating down both legs      COMPARISON:  None      TECHNIQUE:  Sagittal T1, sagittal T2, sagittal inversion recovery, axial T1 and axial T2, coronal T2     IMAGE QUALITY:  Diagnostic     FINDINGS:     VERTEBRAL BODIES:  There are 5 lumbar type vertebral bodies  Mild dextroscoliosis upper lumbar spine at the L2 level  No subluxation    Mild scattered degenerative endplate changes  No focally suspicious marrow lesions  No bone marrow edema or   compression abnormality  Small hemangioma in the right T12 pedicle image 12, series 4 incidentally noted      SACRUM:  Normal signal within the sacrum  No evidence of insufficiency or stress fracture      DISTAL CORD AND CONUS:  Normal size and signal within the distal cord and conus  The conus medullaris terminates at the L1-2 level      PARASPINAL SOFT TISSUES:  Paraspinal soft tissues are unremarkable      LOWER THORACIC DISC SPACES:  Normal disc height and signal   No disc herniation, canal stenosis or foraminal narrowing      LUMBAR DISC SPACES:     L1-L2:  Normal      L2-L3:  Normal      L3-L4:  Small far right lateral neural foraminal disc protrusion  Mild right neural foraminal narrowing  Central canal and left neural foramen patent      L4-L5:  Small left neural foraminal disc protrusion  Mild left neural foramen  Central canal and right neural foramen patent      L5-S1:  There is a diffuse disk bulge  No significant central canal or neural foraminal narrowing      IMPRESSION:     Mild noncompressive lumbar degenerative change        Orders Placed This Encounter   Procedures    FL spine and pain procedure

## 2021-09-29 DIAGNOSIS — I10 ESSENTIAL HYPERTENSION: ICD-10-CM

## 2021-09-29 RX ORDER — AMLODIPINE BESYLATE 5 MG/1
TABLET ORAL
Qty: 30 TABLET | Refills: 0 | Status: SHIPPED | OUTPATIENT
Start: 2021-09-29 | End: 2021-10-29

## 2021-09-29 RX ORDER — LISINOPRIL 20 MG/1
TABLET ORAL
Qty: 30 TABLET | Refills: 0 | Status: SHIPPED | OUTPATIENT
Start: 2021-09-29 | End: 2021-10-29

## 2021-10-03 DIAGNOSIS — G47.00 INSOMNIA, UNSPECIFIED TYPE: ICD-10-CM

## 2021-10-03 RX ORDER — ONDANSETRON 4 MG/1
TABLET, ORALLY DISINTEGRATING ORAL
Qty: 30 TABLET | Refills: 0 | Status: SHIPPED | OUTPATIENT
Start: 2021-10-03 | End: 2021-11-30

## 2021-10-07 ENCOUNTER — PROCEDURE VISIT (OUTPATIENT)
Dept: NEUROLOGY | Facility: CLINIC | Age: 46
End: 2021-10-07
Payer: COMMERCIAL

## 2021-10-07 DIAGNOSIS — R20.2 NUMBNESS AND TINGLING IN RIGHT HAND: ICD-10-CM

## 2021-10-07 DIAGNOSIS — R20.0 NUMBNESS AND TINGLING IN RIGHT HAND: ICD-10-CM

## 2021-10-07 PROCEDURE — 95908 NRV CNDJ TST 3-4 STUDIES: CPT | Performed by: PSYCHIATRY & NEUROLOGY

## 2021-10-07 PROCEDURE — 95886 MUSC TEST DONE W/N TEST COMP: CPT | Performed by: PSYCHIATRY & NEUROLOGY

## 2021-10-12 DIAGNOSIS — E78.2 MIXED HYPERLIPIDEMIA: ICD-10-CM

## 2021-10-12 RX ORDER — ROSUVASTATIN CALCIUM 40 MG/1
TABLET, COATED ORAL
Qty: 90 TABLET | Refills: 0 | Status: SHIPPED | OUTPATIENT
Start: 2021-10-12 | End: 2022-01-09

## 2021-10-13 ENCOUNTER — OFFICE VISIT (OUTPATIENT)
Dept: GASTROENTEROLOGY | Facility: CLINIC | Age: 46
End: 2021-10-13
Payer: COMMERCIAL

## 2021-10-13 ENCOUNTER — TELEPHONE (OUTPATIENT)
Dept: GASTROENTEROLOGY | Facility: CLINIC | Age: 46
End: 2021-10-13

## 2021-10-13 VITALS
WEIGHT: 209.2 LBS | SYSTOLIC BLOOD PRESSURE: 130 MMHG | HEIGHT: 71 IN | DIASTOLIC BLOOD PRESSURE: 80 MMHG | HEART RATE: 86 BPM | BODY MASS INDEX: 29.29 KG/M2

## 2021-10-13 DIAGNOSIS — K86.1 CHRONIC PANCREATITIS, UNSPECIFIED PANCREATITIS TYPE (HCC): ICD-10-CM

## 2021-10-13 DIAGNOSIS — K59.09 CHRONIC CONSTIPATION: Primary | ICD-10-CM

## 2021-10-13 DIAGNOSIS — E78.1 HYPERTRIGLYCERIDEMIA: ICD-10-CM

## 2021-10-13 PROCEDURE — 1111F DSCHRG MED/CURRENT MED MERGE: CPT | Performed by: PHYSICIAN ASSISTANT

## 2021-10-13 PROCEDURE — 3008F BODY MASS INDEX DOCD: CPT | Performed by: PHYSICIAN ASSISTANT

## 2021-10-13 PROCEDURE — 99214 OFFICE O/P EST MOD 30 MIN: CPT | Performed by: PHYSICIAN ASSISTANT

## 2021-10-18 DIAGNOSIS — G89.4 CHRONIC PAIN SYNDROME: ICD-10-CM

## 2021-10-19 RX ORDER — PREGABALIN 100 MG/1
CAPSULE ORAL
Qty: 60 CAPSULE | Refills: 0 | Status: SHIPPED | OUTPATIENT
Start: 2021-10-19 | End: 2021-11-16

## 2021-10-22 ENCOUNTER — TELEPHONE (OUTPATIENT)
Dept: RADIOLOGY | Facility: CLINIC | Age: 46
End: 2021-10-22

## 2021-10-25 ENCOUNTER — TELEPHONE (OUTPATIENT)
Dept: GASTROENTEROLOGY | Facility: CLINIC | Age: 46
End: 2021-10-25

## 2021-10-29 DIAGNOSIS — I10 ESSENTIAL HYPERTENSION: ICD-10-CM

## 2021-10-29 RX ORDER — AMLODIPINE BESYLATE 5 MG/1
TABLET ORAL
Qty: 30 TABLET | Refills: 0 | Status: SHIPPED | OUTPATIENT
Start: 2021-10-29 | End: 2021-11-24

## 2021-10-29 RX ORDER — LISINOPRIL 20 MG/1
TABLET ORAL
Qty: 30 TABLET | Refills: 0 | Status: SHIPPED | OUTPATIENT
Start: 2021-10-29 | End: 2021-11-24

## 2021-11-16 DIAGNOSIS — G89.4 CHRONIC PAIN SYNDROME: ICD-10-CM

## 2021-11-16 DIAGNOSIS — E78.2 MIXED HYPERLIPIDEMIA: ICD-10-CM

## 2021-11-16 DIAGNOSIS — E78.1 HYPERTRIGLYCERIDEMIA: ICD-10-CM

## 2021-11-16 RX ORDER — FENOFIBRIC ACID 135 MG/1
CAPSULE, DELAYED RELEASE ORAL
Qty: 90 CAPSULE | Refills: 0 | Status: SHIPPED | OUTPATIENT
Start: 2021-11-16 | End: 2022-02-10

## 2021-11-16 RX ORDER — PREGABALIN 100 MG/1
CAPSULE ORAL
Qty: 60 CAPSULE | Refills: 0 | Status: SHIPPED | OUTPATIENT
Start: 2021-11-16 | End: 2021-12-20 | Stop reason: SDUPTHER

## 2021-11-18 ENCOUNTER — PROCEDURE VISIT (OUTPATIENT)
Dept: PAIN MEDICINE | Facility: CLINIC | Age: 46
End: 2021-11-18
Payer: COMMERCIAL

## 2021-11-18 DIAGNOSIS — M79.18 MYOFASCIAL PAIN SYNDROME: Primary | ICD-10-CM

## 2021-11-18 PROCEDURE — 20553 NJX 1/MLT TRIGGER POINTS 3/>: CPT | Performed by: STUDENT IN AN ORGANIZED HEALTH CARE EDUCATION/TRAINING PROGRAM

## 2021-11-18 PROCEDURE — 76942 ECHO GUIDE FOR BIOPSY: CPT | Performed by: STUDENT IN AN ORGANIZED HEALTH CARE EDUCATION/TRAINING PROGRAM

## 2021-11-18 RX ORDER — METHYLPREDNISOLONE ACETATE 40 MG/ML
40 INJECTION, SUSPENSION INTRA-ARTICULAR; INTRALESIONAL; INTRAMUSCULAR; SOFT TISSUE ONCE
Status: COMPLETED | OUTPATIENT
Start: 2021-11-18 | End: 2021-11-19

## 2021-11-18 RX ORDER — BUPIVACAINE HYDROCHLORIDE 2.5 MG/ML
4 INJECTION, SOLUTION EPIDURAL; INFILTRATION; INTRACAUDAL ONCE
Status: COMPLETED | OUTPATIENT
Start: 2021-11-18 | End: 2021-11-19

## 2021-11-19 RX ADMIN — BUPIVACAINE HYDROCHLORIDE 4 ML: 2.5 INJECTION, SOLUTION EPIDURAL; INFILTRATION; INTRACAUDAL at 08:40

## 2021-11-19 RX ADMIN — METHYLPREDNISOLONE ACETATE 40 MG: 40 INJECTION, SUSPENSION INTRA-ARTICULAR; INTRALESIONAL; INTRAMUSCULAR; SOFT TISSUE at 08:41

## 2021-11-24 DIAGNOSIS — I10 ESSENTIAL HYPERTENSION: ICD-10-CM

## 2021-11-24 RX ORDER — LISINOPRIL 20 MG/1
TABLET ORAL
Qty: 30 TABLET | Refills: 0 | Status: SHIPPED | OUTPATIENT
Start: 2021-11-24 | End: 2021-12-29

## 2021-11-24 RX ORDER — AMLODIPINE BESYLATE 5 MG/1
TABLET ORAL
Qty: 30 TABLET | Refills: 0 | Status: SHIPPED | OUTPATIENT
Start: 2021-11-24 | End: 2021-12-29

## 2021-11-27 DIAGNOSIS — G47.00 INSOMNIA, UNSPECIFIED TYPE: ICD-10-CM

## 2021-11-30 RX ORDER — ONDANSETRON 4 MG/1
TABLET, ORALLY DISINTEGRATING ORAL
Qty: 30 TABLET | Refills: 0 | Status: SHIPPED | OUTPATIENT
Start: 2021-11-30 | End: 2022-01-24 | Stop reason: SDUPTHER

## 2021-12-03 DIAGNOSIS — R14.0 ABDOMINAL DISTENTION: ICD-10-CM

## 2021-12-03 DIAGNOSIS — K59.00 CONSTIPATION, ACUTE: ICD-10-CM

## 2021-12-03 DIAGNOSIS — K86.1 CHRONIC PANCREATITIS, UNSPECIFIED PANCREATITIS TYPE (HCC): ICD-10-CM

## 2021-12-03 RX ORDER — PANCRELIPASE 36000; 180000; 114000 [USP'U]/1; [USP'U]/1; [USP'U]/1
CAPSULE, DELAYED RELEASE PELLETS ORAL
Qty: 90 CAPSULE | Refills: 0 | Status: SHIPPED | OUTPATIENT
Start: 2021-12-03 | End: 2022-01-25

## 2021-12-03 RX ORDER — PANCRELIPASE 60000; 12000; 38000 [USP'U]/1; [USP'U]/1; [USP'U]/1
CAPSULE, DELAYED RELEASE PELLETS ORAL
Qty: 90 CAPSULE | Refills: 0 | Status: SHIPPED | OUTPATIENT
Start: 2021-12-03 | End: 2022-01-07

## 2021-12-08 DIAGNOSIS — E78.2 MIXED HYPERLIPIDEMIA: ICD-10-CM

## 2021-12-08 RX ORDER — OMEGA-3-ACID ETHYL ESTERS 1 G/1
CAPSULE, LIQUID FILLED ORAL
Qty: 360 CAPSULE | Refills: 0 | Status: SHIPPED | OUTPATIENT
Start: 2021-12-08 | End: 2022-04-06

## 2021-12-20 ENCOUNTER — TELEPHONE (OUTPATIENT)
Dept: GASTROENTEROLOGY | Facility: CLINIC | Age: 46
End: 2021-12-20

## 2021-12-20 DIAGNOSIS — G89.4 CHRONIC PAIN SYNDROME: ICD-10-CM

## 2021-12-20 DIAGNOSIS — J45.909 UNCOMPLICATED ASTHMA, UNSPECIFIED ASTHMA SEVERITY, UNSPECIFIED WHETHER PERSISTENT: ICD-10-CM

## 2021-12-21 RX ORDER — PREGABALIN 100 MG/1
100 CAPSULE ORAL 2 TIMES DAILY
Qty: 60 CAPSULE | Refills: 0 | Status: SHIPPED | OUTPATIENT
Start: 2021-12-21 | End: 2022-01-18

## 2021-12-21 RX ORDER — ALBUTEROL SULFATE 90 UG/1
2 AEROSOL, METERED RESPIRATORY (INHALATION) EVERY 6 HOURS PRN
Qty: 54 G | Refills: 0 | Status: SHIPPED | OUTPATIENT
Start: 2021-12-21 | End: 2022-01-24 | Stop reason: SDUPTHER

## 2021-12-29 DIAGNOSIS — I10 ESSENTIAL HYPERTENSION: ICD-10-CM

## 2021-12-29 RX ORDER — LISINOPRIL 20 MG/1
TABLET ORAL
Qty: 30 TABLET | Refills: 0 | Status: SHIPPED | OUTPATIENT
Start: 2021-12-29 | End: 2022-02-01

## 2021-12-29 RX ORDER — AMLODIPINE BESYLATE 5 MG/1
TABLET ORAL
Qty: 30 TABLET | Refills: 0 | Status: SHIPPED | OUTPATIENT
Start: 2021-12-29 | End: 2022-02-01

## 2022-01-07 DIAGNOSIS — K86.1 CHRONIC PANCREATITIS, UNSPECIFIED PANCREATITIS TYPE (HCC): ICD-10-CM

## 2022-01-07 RX ORDER — PANCRELIPASE 60000; 12000; 38000 [USP'U]/1; [USP'U]/1; [USP'U]/1
CAPSULE, DELAYED RELEASE PELLETS ORAL
Qty: 90 CAPSULE | Refills: 0 | Status: SHIPPED | OUTPATIENT
Start: 2022-01-07

## 2022-01-09 DIAGNOSIS — E78.2 MIXED HYPERLIPIDEMIA: ICD-10-CM

## 2022-01-09 RX ORDER — ROSUVASTATIN CALCIUM 40 MG/1
TABLET, COATED ORAL
Qty: 90 TABLET | Refills: 0 | Status: SHIPPED | OUTPATIENT
Start: 2022-01-09 | End: 2022-04-15

## 2022-01-13 ENCOUNTER — TELEMEDICINE (OUTPATIENT)
Dept: FAMILY MEDICINE CLINIC | Facility: CLINIC | Age: 47
End: 2022-01-13
Payer: COMMERCIAL

## 2022-01-13 DIAGNOSIS — B34.9 VIRAL INFECTION, UNSPECIFIED: Primary | ICD-10-CM

## 2022-01-13 PROCEDURE — U0003 INFECTIOUS AGENT DETECTION BY NUCLEIC ACID (DNA OR RNA); SEVERE ACUTE RESPIRATORY SYNDROME CORONAVIRUS 2 (SARS-COV-2) (CORONAVIRUS DISEASE [COVID-19]), AMPLIFIED PROBE TECHNIQUE, MAKING USE OF HIGH THROUGHPUT TECHNOLOGIES AS DESCRIBED BY CMS-2020-01-R: HCPCS | Performed by: NURSE PRACTITIONER

## 2022-01-13 PROCEDURE — 99213 OFFICE O/P EST LOW 20 MIN: CPT | Performed by: NURSE PRACTITIONER

## 2022-01-13 PROCEDURE — U0005 INFEC AGEN DETEC AMPLI PROBE: HCPCS | Performed by: NURSE PRACTITIONER

## 2022-01-13 NOTE — PROGRESS NOTES
COVID-19 Outpatient Progress Note    Assessment/Plan:    Problem List Items Addressed This Visit     None      Visit Diagnoses     Viral infection, unspecified    -  Primary    Encouraged incrased hydration, rest, vitamin-c, d, zinc  Warm air humidifier for room  To r/o Covid, call office for worsening/concerning symptoms    Relevant Orders    COVID Only - Office Collect         Disposition:     Recommended patient to come to the office to test for COVID-19  I have spent 8 minutes directly with the patient  Greater than 50% of this time was spent in counseling/coordination of care regarding: instructions for management and patient and family education  Encounter provider LUANA Guzmán    Provider located at [facility] [address] [facility]  [address] [facility] [address] [phone]    Recent Visits  No visits were found meeting these conditions  Showing recent visits within past 7 days and meeting all other requirements  Today's Visits  Date Type Provider Dept   01/13/22 [address] [facility], LUANA [facility] [address] [facility]   Showing today's visits and meeting all other requirements  Future Appointments  No visits were found meeting these conditions  Showing future appointments within next 150 days and meeting all other requirements     This virtual check-in was done via [address] and patient was informed that this is a secure, HIPAA-compliant platform  He agrees to proceed  Patient agrees to participate in a virtual check in via telephone or video visit instead of presenting to the office to address urgent/immediate medical needs  Patient is aware this is a billable service  After connecting through [facility], the patient was identified by name and date of birth  Dale Chacon was informed that this was a telemedicine visit and that the exam was being conducted confidentially over secure lines   My office door was closed  No one else was in the room  Skip Naidu acknowledged consent and understanding of privacy and security of the telemedicine visit  I informed the patient that I have reviewed his record in Epic and presented the opportunity for him to ask any questions regarding the visit today  The patient agreed to participate  Verification of patient location:  Patient is located in the following state in which I hold an active license: [place]    Subjective:   Skip Naidu is a 48 y o  male who is concerned about COVID-19  Patient's symptoms include fatigue, nasal congestion, cough and headache  Patient denies fever, chills, malaise, rhinorrhea, sore throat, anosmia, loss of taste, shortness of breath, chest tightness, abdominal pain, nausea, vomiting, diarrhea and myalgias       - Date of symptom onset: 1/8/2022      COVID-19 vaccination status: Fully vaccinated (primary series)    Exposure:   Contact with a person who is under investigation (PUI) for or who is positive for COVID-19 within the last 14 days?: No    Hospitalized recently for fever and/or lower respiratory symptoms?: No      Currently a healthcare worker that is involved in direct patient care?: No      Works in a special setting where the risk of COVID-19 transmission may be high? (this may include long-term care, correctional and penitentiary facilities; homeless shelters; assisted-living facilities and group homes ): No      Resident in a special setting where the risk of COVID-19 transmission may be high? (this may include long-term care, correctional and penitentiary facilities; homeless shelters; assisted-living facilities and group homes ): No      Lab Results   Component Value Date    SARSCOV2 Negative 04/20/2021    [address] [facility] 98 Not Detected 04/13/2020     Past Medical History:   Diagnosis Date    Asthma     Chronic pain disorder     Diabetes mellitus ([facility] [address] )     GERD (gastroesophageal reflux disease)     Hyperlipidemia     Liver abscess     Meniscus tear     left knee work injury last assessed 08/24/2016    Pancreatitis     Pneumonia      Past Surgical History:   Procedure Laterality Date    CELIAC PLEXUS BLOCK Bilateral 10/29/2018    Procedure: SPLANCHNIC NERVE BLOCK;  Surgeon: Lenny Garcia MD;  [facility];  Service: Pain Management     CELIAC PLEXUS BLOCK Bilateral 1/10/2019    Procedure: SPLANCHNIC NERVE BLOCK;  Surgeon: Lenny Garcia MD;  [facility];  Service: Pain Management     CHOLECYSTECTOMY      ESOPHAGOGASTRODUODENOSCOPY N/A 11/16/2017    Procedure: ESOPHAGOGASTRODUODENOSCOPY (EGD); Surgeon: Ron Posada MD;  [facility]; Service: Gastroenterology    ESOPHAGOGASTRODUODENOSCOPY N/A 4/19/2018    Procedure: ESOPHAGOGASTRODUODENOSCOPY (EGD); Surgeon: Chacha Chandra MD;  [facility]; Service: Gastroenterology    ESOPHAGOGASTRODUODENOSCOPY N/A 5/10/2018    Procedure: ESOPHAGOGASTRODUODENOSCOPY (EGD); Surgeon: Ladi Chauhan MD;  [facility];   Service: Gastroenterology    IR TEMPORARY DIALYSIS CATHETER PLACEMENT  2/28/2019    KNEE ARTHROSCOPY Bilateral     KNEE ARTHROSCOPY Right 2009    cibishino last assessed 08/24/2016    KNEE ARTHROSCOPY Right 07/01/2019    LIVER SURGERY      NERVE BLOCK Bilateral 5/29/2018    Procedure: SPLANCHNIC NERVE BLOCK;  Surgeon: Lenny Garcia MD;  [facility];  Service: Pain Management     PANCREAS SURGERY      stents    PANCREATIC CYST EXCISION      CT INJECT NERV BLCK,CELIAC PLEXUS Bilateral 5/9/2017    Procedure: CELIAC PLEXUS BLOCK ;  Surgeon: Lenny Garcia MD;  [facility];  Service: Pain Management     CT INJECT NERV BLCK,CELIAC PLEXUS Bilateral 6/1/2017    Procedure: SPLANCHNIC NERVE BLOCK at T12;  Surgeon: Lenny Garcia MD;  [facility];  Service: Pain Management     CT INJECT NERV BLCK,CELIAC PLEXUS Bilateral 8/8/2017    Procedure: BILATERAL SPLANCHNIC NERVE BLOCK T12;  Surgeon: Lenny Garcia MD; [facility];  Service: Pain Management     CA INJECT NERV BLCK,CELIAC PLEXUS Bilateral 4/18/2019    Procedure: BLOCK / INJECTION CELIAC PLEXUS;  Surgeon: Nisreen Crowder MD;  [facility];  Service: Pain Management     CA INJECT NERV BLCK,CELIAC PLEXUS Bilateral 8/20/2019    Procedure: SPLANCHNIC NERVE BLOCK;  Surgeon: Nisreen Crowder MD;  [facility];  Service: Pain Management     CA INJECT NERV BLCK,CELIAC PLEXUS Bilateral 10/17/2019    Procedure: SPLANCHNIC NERVE BLOCK;  Surgeon: Nisreen Crowder MD;  [facility];  Service: Pain Management     CA INJECT NERV Scherrie Cardinal Bilateral 12/28/2017    Procedure: SPLANCHNIC NERVE BLOCK;  Surgeon: Nisreen Crowder MD;  [facility];  Service: Pain Management     CA LAP,CHOLECYSTECTOMY N/A 2/14/2017    Procedure: LAPAROSCOPIC CHOLECYSTECTOMY, IOC, POSSIBLE OPEN ;  Surgeon: Sheba Parry MD;  [facility];  Service: General    ROTATOR CUFF REPAIR Right     SHOULDER ARTHROSCOPY      SHOULDER ARTHROSCOPY Right      Current Outpatient Medications   Medication Sig Dispense Refill    albuterol (PROVENTIL HFA,VENTOLIN HFA) 90 mcg/act inhaler Inhale 2 puffs every 6 (six) hours as needed for wheezing 54 g 0    amLODIPine (NORVASC) 5 mg tablet TAKE ONE TABLET BY MOUTH EVERY DAY 30 tablet 0    Choline Fenofibrate (Fenofibric Acid) 135 MG CPDR TAKE ONE CAPSULE BY MOUTH EVERY DAY 90 capsule 0    Creon 99428-18549 units capsule TAKE 1 CAPSULE OF 12,000 UNITS OF LIPASE BY MOUTH UP TO 3 TIMES A DAY AS NEEDED FOR SNACKS 90 capsule 0    Creon 09444-791925 units CPEP TAKE ONE CAPSULE (36,000 UNITS TOTAL) BY MOUTH THREE TIMES DAILY BEFORE MEALS 90 capsule 0    esomeprazole (NexIUM) 40 MG capsule TAKE ONE CAPSULE BY MOUTH TWICE DAILY before meals (Patient taking differently: Take 40 mg by mouth daily  ) 60 capsule 5    famotidine (PEPCID) 40 MG tablet Take 40 mg by mouth daily at bedtime as needed       insulin degludec Lucita Cobb FlexTouch) 100 units/mL injection pen 20 units subcut in hs      Insulin Pen Needle (Pen Needles) 32G X 5 MM MISC use once daily as directed      lisinopril (ZESTRIL) 20 mg tablet TAKE ONE TABLET BY MOUTH EVERY DAY 30 tablet 0    omega-3-acid ethyl esters (LOVAZA) 1 g capsule TAKE TWO CAPSULES BY MOUTH TWICE DAILY 360 capsule 0    ondansetron (ZOFRAN-ODT) 4 mg disintegrating tablet DISSOLVE ONE TABLET IN MOUTH EVERY EIGHT HOURS as needed for nausea or vomiting 30 tablet 0    pregabalin (LYRICA) 100 mg capsule Take 1 capsule (100 mg total) by mouth 2 (two) times a day 60 capsule 0    rosuvastatin (CRESTOR) 40 MG tablet TAKE ONE TABLET BY MOUTH EVERY DAY 90 tablet 0    traZODone (DESYREL) 50 mg tablet Take 1 tablet (50 mg total) by mouth daily at bedtime 30 tablet 5    ergocalciferol (ERGOCALCIFEROL) 1 25 MG (66932 UT) capsule Take 50,000 Units by mouth      Evolocumab with Infusor (Repatha Pushtronex System) 420 MG/3 5ML SOCT Inject 420 mg under the skin every 28 days (Patient not taking: Reported on 10/13/2021)      Lidocaine-Menthol 4-1 % PTCH NuLido Patch 4 %-1 % topical   Apply to affected area twice a day   traMADol (ULTRAM) 50 mg tablet Take 1 tablet (50 mg total) by mouth every 8 (eight) hours as needed for severe pain 25 tablet 0     No current facility-administered medications for this visit  Allergies   Allergen Reactions    Bee Venom Swelling       Review of Systems   Constitutional: Positive for fatigue  Negative for chills and fever  HENT: Positive for congestion  Negative for ear pain, postnasal drip, rhinorrhea, sinus pressure, sinus pain and sore throat  Eyes: Negative for pain and itching  Respiratory: Positive for cough  Negative for chest tightness and shortness of breath  Cardiovascular: Negative for chest pain and palpitations  Gastrointestinal: Negative for abdominal pain, diarrhea, nausea and vomiting     Genitourinary: Negative for decreased urine volume  Musculoskeletal: Negative for arthralgias, myalgias, neck pain and neck stiffness  Skin: Negative for color change and rash  Neurological: Positive for headaches  Negative for dizziness, weakness, light-headedness and numbness  Psychiatric/Behavioral: Negative for confusion  Objective: There were no vitals filed for this visit  Physical Exam  Constitutional:       General: He is not in acute distress  Appearance: Normal appearance  He is not ill-appearing  HENT:      Head: Normocephalic  Right Ear: External ear normal       Left Ear: External ear normal    Pulmonary:      Effort: Pulmonary effort is normal  No respiratory distress  Musculoskeletal:         General: Normal range of motion  Cervical back: Normal range of motion  Skin:     Coloration: Skin is not pale  Neurological:      Mental Status: He is alert and oriented to person, place, and time  Psychiatric:         Mood and Affect: Mood normal          Behavior: Behavior normal          VIRTUAL VISIT DISCLAIMER    Pedrito Recinos verbally agrees to participate in [employer]  Pt is aware that [employer] could be limited without vital signs or the ability to perform a full hands-on physical Lawrance Ktaelyn understands he or the provider may request at any time to terminate the video visit and request the patient to seek care or treatment in person

## 2022-01-13 NOTE — PATIENT INSTRUCTIONS
Viral Syndrome   AMBULATORY CARE:   Viral syndrome  is a term used for symptoms of an infection caused by a virus  Viruses are spread easily from person to person through the air and on shared items  Signs and symptoms  may start slowly or suddenly and last hours to days  They can be mild to severe and can change over days or hours  You may have any of the following:  · Fever and chills    · A runny or stuffy nose    · Cough, sore throat, or hoarseness    · Headache, or pain and pressure around your eyes    · Muscle aches and joint pain    · Shortness of breath or wheezing    · Abdominal pain, cramps, and diarrhea    · Nausea, vomiting, or loss of appetite    Call your local emergency number (911 in the [address] [facility]) or have someone else call if:   · You have a seizure  · You cannot be woken  · You have chest pain or trouble breathing  Seek care immediately if:   · You have a stiff neck, a bad headache, and sensitivity to light  · You feel weak, dizzy, or confused  · You stop urinating or urinate a lot less than usual     · You cough up blood or thick yellow or green mucus  · You have severe abdominal pain or your abdomen is larger than usual     Call your doctor if:   · Your symptoms do not get better with treatment or get worse after 3 days  · You have a rash or ear pain  · You have burning when you urinate  · You have questions or concerns about your condition or care  Treatment for viral syndrome  may include medicines to manage your symptoms  Antibiotics are not given for a viral infection  You may  need any of the following:  · Acetaminophen  decreases pain and fever  It is available without a doctor's order  Ask how much to take and how often to take it  Follow directions  Read the labels of all other medicines you are using to see if they also contain acetaminophen, or ask your doctor or pharmacist  Acetaminophen can cause liver damage if not taken correctly   Do not use more than 4 grams (4,000 milligrams) total of acetaminophen in one day  · NSAIDs , such as ibuprofen, help decrease swelling, pain, and fever  NSAIDs can cause stomach bleeding or kidney problems in certain people  If you take blood thinner medicine, always ask your healthcare provider if NSAIDs are safe for you  Always read the medicine label and follow directions  · Cold medicine  helps decrease swelling, control a cough, and relieve chest or nasal congestion  · Saline nasal spray  helps decrease nasal congestion  Manage your symptoms:   · Drink liquids as directed to prevent dehydration  Ask how much liquid to drink each day and which liquids are best for you  Ask if you should drink an oral rehydration solution (ORS)  An ORS has the right amounts of water, salts, and sugar you need to replace body fluids  This may help prevent dehydration caused by vomiting or diarrhea  Do not drink liquids with caffeine  Liquids with caffeine can make dehydration worse  · Get plenty of rest to help your body heal   Take naps throughout the day  Ask your healthcare provider when you can return to work and your normal activities  · Use a cool mist humidifier to help you breathe easier  Ask your healthcare provider how to use a cool mist humidifier  · Eat honey or use cough drops for a sore throat  Cough drops are available without a doctor's order  Follow directions for taking cough drops  · Do not smoke or be close to anyone who is smoking  Nicotine and other chemicals in cigarettes and cigars can cause lung damage  Smoking can also delay healing  Ask your healthcare provider for information if you currently smoke and need help to quit  E-cigarettes or smokeless tobacco still contain nicotine  Talk to your healthcare provider before you use these products  Prevent the spread of germs:       · Wash your hands often  Wash your hands several times each day   Wash after you use the bathroom, change a child's diaper, and before you prepare or eat food  Use soap and water every time  Rub your soapy hands together, lacing your fingers  Wash the front and back of your hands, and in between your fingers  Use the fingers of one hand to scrub under the fingernails of the other hand  Wash for at least 20 seconds  Rinse with warm, running water for several seconds  Then dry your hands with a clean towel or paper towel  Use hand  that contains alcohol if soap and water are not available  Do not touch your eyes, nose, or mouth without washing your hands first          · Cover a sneeze or cough  Use a tissue that covers your mouth and nose  Throw the tissue away in a trash can right away  Use the bend of your arm if a tissue is not available  Wash your hands well with soap and water or use a hand   · Stay away from others while you are sick  Avoid crowds as much as possible  · Ask about vaccines you may need  Talk to your healthcare provider about your vaccine history  He or she will tell you which vaccines you need, and when to get them  ? Get the influenza (flu) vaccine as soon as recommended each year  The flu vaccine is available starting in September or October  Flu viruses change, so it is important to get a flu vaccine every year  ? Get the pneumonia vaccine if recommended  This vaccine is usually recommended every 5 years  Your provider will tell you when to get this vaccine, if needed  Follow up with your doctor as directed:  Write down your questions so you remember to ask them during your visits  © [employer] 2021 Information is for End User's use only and may not be sold, redistributed or otherwise used for commercial purposes  All illustrations and images included in CareNotes® are the copyrighted property of [employer]  or Kady Estrada  The above information is an  only  It is not intended as medical advice for individual conditions or treatments  Talk to your doctor, nurse or pharmacist before following any medical regimen to see if it is safe and effective for you

## 2022-01-14 DIAGNOSIS — G89.4 CHRONIC PAIN SYNDROME: ICD-10-CM

## 2022-01-14 LAB — SARS-COV-2 RNA RESP QL NAA+PROBE: POSITIVE

## 2022-01-18 RX ORDER — PREGABALIN 100 MG/1
CAPSULE ORAL
Qty: 60 CAPSULE | Refills: 0 | Status: SHIPPED | OUTPATIENT
Start: 2022-01-18 | End: 2022-01-19 | Stop reason: SDUPTHER

## 2022-01-19 DIAGNOSIS — G89.4 CHRONIC PAIN SYNDROME: ICD-10-CM

## 2022-01-20 DIAGNOSIS — K86.1 CHRONIC PANCREATITIS, UNSPECIFIED PANCREATITIS TYPE (HCC): ICD-10-CM

## 2022-01-20 DIAGNOSIS — K59.00 CONSTIPATION, ACUTE: ICD-10-CM

## 2022-01-20 DIAGNOSIS — R14.0 ABDOMINAL DISTENTION: ICD-10-CM

## 2022-01-20 RX ORDER — PREGABALIN 100 MG/1
100 CAPSULE ORAL 2 TIMES DAILY
Qty: 60 CAPSULE | Refills: 0 | Status: SHIPPED | OUTPATIENT
Start: 2022-01-20 | End: 2022-02-21

## 2022-01-20 RX ORDER — PANCRELIPASE 36000; 180000; 114000 [USP'U]/1; [USP'U]/1; [USP'U]/1
CAPSULE, DELAYED RELEASE PELLETS ORAL
Qty: 90 CAPSULE | Refills: 0 | OUTPATIENT
Start: 2022-01-20

## 2022-01-24 DIAGNOSIS — K86.1 CHRONIC PANCREATITIS, UNSPECIFIED PANCREATITIS TYPE (HCC): ICD-10-CM

## 2022-01-24 DIAGNOSIS — J45.909 UNCOMPLICATED ASTHMA, UNSPECIFIED ASTHMA SEVERITY, UNSPECIFIED WHETHER PERSISTENT: ICD-10-CM

## 2022-01-24 DIAGNOSIS — R14.0 ABDOMINAL DISTENTION: ICD-10-CM

## 2022-01-24 DIAGNOSIS — K59.00 CONSTIPATION, ACUTE: ICD-10-CM

## 2022-01-24 DIAGNOSIS — G47.00 INSOMNIA, UNSPECIFIED TYPE: ICD-10-CM

## 2022-01-24 RX ORDER — PANCRELIPASE 36000; 180000; 114000 [USP'U]/1; [USP'U]/1; [USP'U]/1
CAPSULE, DELAYED RELEASE PELLETS ORAL
Qty: 90 CAPSULE | Refills: 0 | Status: CANCELLED | OUTPATIENT
Start: 2022-01-24

## 2022-01-25 RX ORDER — PANCRELIPASE 36000; 180000; 114000 [USP'U]/1; [USP'U]/1; [USP'U]/1
CAPSULE, DELAYED RELEASE PELLETS ORAL
Qty: 90 CAPSULE | Refills: 0 | Status: SHIPPED | OUTPATIENT
Start: 2022-01-25 | End: 2022-03-01

## 2022-01-25 RX ORDER — ONDANSETRON 4 MG/1
4 TABLET, ORALLY DISINTEGRATING ORAL EVERY 8 HOURS PRN
Qty: 30 TABLET | Refills: 0 | Status: SHIPPED | OUTPATIENT
Start: 2022-01-25 | End: 2022-04-01

## 2022-01-25 RX ORDER — ALBUTEROL SULFATE 90 UG/1
2 AEROSOL, METERED RESPIRATORY (INHALATION) EVERY 6 HOURS PRN
Qty: 54 G | Refills: 0 | Status: SHIPPED | OUTPATIENT
Start: 2022-01-25 | End: 2022-04-14

## 2022-01-26 NOTE — SOCIAL WORK
CM met with pt at bedside  Pt lives in [place] with his wife, Ervin Ku  Pt denies DME and completes ADL's independently  Pt denies hx of rehab and Adebayo Hess  Pt uses [facility]  Pt denies hx of MH and SA  Pt's wife is POA and he does not have AD  Pt declined information  Pt works as   CM reviewed discharge planning process including the following: identifying help at home, patient preference for discharge planning needs, pharmacy preference, and availability of treatment team to discuss questions or concerns patient and/or family may have regarding understanding medications and recognizing signs and symptoms once discharged  CM also encouraged patient to follow up with all recommended appointments after discharge  Patient advised of importance for patient and family to participate in managing patients medical well being  CM name and role reviewed  Discharge Checklist reviewed and CM will continue to monitor for progress toward discharge goals in nursing and provider rounds  Pt has no needs at this time  CM provided warm hand off to Claudio Tidwell, [address]  Cm department to follow pt through dc  normal sinus rhythm

## 2022-01-27 ENCOUNTER — TELEMEDICINE (OUTPATIENT)
Dept: GASTROENTEROLOGY | Facility: MEDICAL CENTER | Age: 47
End: 2022-01-27

## 2022-01-27 DIAGNOSIS — E78.1 HYPERTRIGLYCERIDEMIA: ICD-10-CM

## 2022-01-27 DIAGNOSIS — R10.10 UPPER ABDOMINAL PAIN: ICD-10-CM

## 2022-01-27 DIAGNOSIS — K85.90 ACUTE ON CHRONIC PANCREATITIS (HCC): Primary | ICD-10-CM

## 2022-01-27 DIAGNOSIS — R10.13 ABDOMINAL PAIN, EPIGASTRIC: ICD-10-CM

## 2022-01-27 DIAGNOSIS — R74.8 ABNORMAL TRANSAMINASES: ICD-10-CM

## 2022-01-27 DIAGNOSIS — K85.90 PANCREATITIS, UNSPECIFIED PANCREATITIS TYPE: ICD-10-CM

## 2022-01-27 DIAGNOSIS — K21.9 GASTROESOPHAGEAL REFLUX DISEASE WITHOUT ESOPHAGITIS: ICD-10-CM

## 2022-01-27 DIAGNOSIS — K86.1 ACUTE ON CHRONIC PANCREATITIS (HCC): Primary | ICD-10-CM

## 2022-01-27 PROCEDURE — 99214 OFFICE O/P EST MOD 30 MIN: CPT | Performed by: INTERNAL MEDICINE

## 2022-01-27 NOTE — PROGRESS NOTES
Virtual Regular Visit    Verification of patient location:    Patient is located in the following state in which I hold an active license [place]      Assessment/Plan:    Problem List Items Addressed This Visit        Digestive    Acute on chronic pancreatitis ([facility] [address] ) - Primary    Relevant Orders    MRI abdomen w wo contrast and mrcp    Endoscopic ultrasonography, GI (Upper) Linear with FNA    GERD (gastroesophageal reflux disease)    Pancreatitis, unspecified pancreatitis type    Relevant Orders    MRI abdomen w wo contrast and mrcp    Endoscopic ultrasonography, GI (Upper) Linear with FNA       Other    Abnormal transaminases    Relevant Orders    MRI abdomen w wo contrast and mrcp    Hypertriglyceridemia    Upper abdominal pain    Relevant Orders    MRI abdomen w wo contrast and mrcp    Endoscopic ultrasonography, GI (Upper) Linear with FNA    Abdominal pain, epigastric        57-year-old gentleman presents to us for evaluation of chronic pancreatitis and abdominal pain requesting a celiac plexus neurolysis/block  He has had multiple celiac plexus block by pain management  He would like to have a different approach  He does have abdominal pain which may suggest chronic pancreatitis but he does not have any evidence of exocrine pancreatic insufficiency or endocrine pancreatic insufficiency or imaging findings of chronic pancreatitis  He had endoscopic ultrasound done which showed hyperechoic foci and duct walls which are again suggestive of chronic pancreatitis but no other evidence of chronic pancreatitis was seen which would include pseudocyst, duct dilation common duct irregularity or calcifications  I discussed with him that since his LFTs have been elevated as well during these episodes it might be prudent to consider if he could have sphincter of Oddi dysfunction  I would recommended MRI/MRCP  If he does have sphincter of Oddi dysfunction then he may require an ERCP with sphincterotomy      Will also recommend checking for pancreatic fecal elastase  I will schedule him for an endoscopic ultrasound as well with the celiac plexus block  Discussed the procedure possible risks and complications with him  I felt that we could try this 1 time but if it does not work I am not sure if it will continue to offer him much relief  Reason for visit is   Chief Complaint   Patient presents with    Virtual Regular Visit        Encounter provider Merritt Alarcon MD    Provider located at [address] [id number]      Recent Visits  No visits were found meeting these conditions  Showing recent visits within past 7 days and meeting all other requirements  Today's Visits  Date Type Provider Dept   01/27/22 Telemedicine Merritt Alarcon MD [address] today's visits and meeting all other requirements  Future Appointments  No visits were found meeting these conditions  Showing future appointments within next 150 days and meeting all other requirements       The patient was identified by name and date of birth  Shankar Higgins was informed that this is a telemedicine visit and that the visit is being conducted through [facility] and patient was informed that this is not a secure, HIPAA-compliant platform  He agrees to proceed     My office door was closed  No one else was in the room  He acknowledged consent and understanding of privacy and security of the video platform  The patient has agreed to participate and understands they can discontinue the visit at any time  Patient is aware this is a billable service  Subjective  Shankar Higgins is a 48 y o  male with a history of chronic pancreatitis   This is thought to be secondary to hypertriglyceridemia  I have seen him in the past for endoscopic ultrasound  He did not have any pancreatic calcifications    It does not appear that he has exocrine pancreatic insufficiency either  He has been on Creon  He has had celiac plexus block by pain management  This has been doing well for him however he did have some experience with bleeding and worse pain during 1 of those sessions  He has had 6 - 7 injections  The last one was about 6 months ago  He has been having pain the past 2 months  His pain is in the right upper quadrant  The pain is there the whole day  The pain also travels to the left  He states that his pain has been consistent with his pancreatitis episodes  However I am not able to find any elevated lipase or CT imaging findings the past 1-2 years suggestive of pancreatitis  He feels his pain is usually increased during his episodes of hypertriglyceridemia  He does have elevated LFTs during these episodes  HPI     Past Medical History:   Diagnosis Date    Asthma     Chronic pain disorder     Diabetes mellitus (HCC)     GERD (gastroesophageal reflux disease)     Hyperlipidemia     Liver abscess     Meniscus tear     left knee work injury last assessed 08/24/2016    Pancreatitis     Pneumonia        Past Surgical History:   Procedure Laterality Date    CELIAC PLEXUS BLOCK Bilateral 10/29/2018    Procedure: SPLANCHNIC NERVE BLOCK;  Surgeon: Ervin Myers MD;  [facility];  Service: Pain Management     CELIAC PLEXUS BLOCK Bilateral 1/10/2019    Procedure: SPLANCHNIC NERVE BLOCK;  Surgeon: Ervin Myers MD;  [facility];  Service: Pain Management     CHOLECYSTECTOMY      ESOPHAGOGASTRODUODENOSCOPY N/A 11/16/2017    Procedure: ESOPHAGOGASTRODUODENOSCOPY (EGD); Surgeon: Swati Christiansen MD;  [facility]; Service: Gastroenterology    ESOPHAGOGASTRODUODENOSCOPY N/A 4/19/2018    Procedure: ESOPHAGOGASTRODUODENOSCOPY (EGD); Surgeon: Esa Osorio MD;  [facility]; Service: Gastroenterology    ESOPHAGOGASTRODUODENOSCOPY N/A 5/10/2018    Procedure: ESOPHAGOGASTRODUODENOSCOPY (EGD);   Surgeon: Jagdish Macias MD;  [facility];   Service: Gastroenterology    IR TEMPORARY DIALYSIS CATHETER PLACEMENT  2/28/2019    KNEE ARTHROSCOPY Bilateral     KNEE ARTHROSCOPY Right 2009    cibishino last assessed 08/24/2016    KNEE ARTHROSCOPY Right 07/01/2019    LIVER SURGERY      NERVE BLOCK Bilateral 5/29/2018    Procedure: SPLANCHNIC NERVE BLOCK;  Surgeon: Ml Joseph MD;  [facility];  Service: Pain Management     PANCREAS SURGERY      stents    PANCREATIC CYST EXCISION      VA INJECT NERV BLCK,CELIAC PLEXUS Bilateral 5/9/2017    Procedure: CELIAC PLEXUS BLOCK ;  Surgeon: Ml Joseph MD;  [facility];  Service: Pain Management     VA INJECT NERV BLCK,CELIAC PLEXUS Bilateral 6/1/2017    Procedure: SPLANCHNIC NERVE BLOCK at T12;  Surgeon: Ml Joseph MD;  [facility];  Service: Pain Management     VA INJECT NERV BLCK,CELIAC PLEXUS Bilateral 8/8/2017    Procedure: BILATERAL SPLANCHNIC NERVE BLOCK T12;  Surgeon: Ml Joseph MD;  [facility];  Service: Pain Management     VA INJECT NERV BLCK,CELIAC PLEXUS Bilateral 4/18/2019    Procedure: BLOCK / INJECTION CELIAC PLEXUS;  Surgeon: Ml Joseph MD;  [facility];  Service: Pain Management     VA INJECT NERV BLCK,CELIAC PLEXUS Bilateral 8/20/2019    Procedure: SPLANCHNIC NERVE BLOCK;  Surgeon: Ml Joseph MD;  [facility];  Service: Pain Management     VA INJECT NERV BLCK,CELIAC PLEXUS Bilateral 10/17/2019    Procedure: SPLANCHNIC NERVE BLOCK;  Surgeon: Ml Joseph MD;  [facility];  Service: Pain Management     VA INJECT NERV Gradie Sinks Bilateral 12/28/2017    Procedure: SPLANCHNIC NERVE BLOCK;  Surgeon: Ml Joseph MD;  [facility];  Service: Pain Management     VA LAP,CHOLECYSTECTOMY N/A 2/14/2017    Procedure: LAPAROSCOPIC CHOLECYSTECTOMY, IOC, POSSIBLE OPEN ;  Surgeon: Kenya Pratt MD;  [facility];  Service: General    ROTATOR CUFF REPAIR Right     SHOULDER ARTHROSCOPY      SHOULDER ARTHROSCOPY Right        Current Outpatient Medications   Medication Sig Dispense Refill    albuterol (PROVENTIL HFA,VENTOLIN HFA) 90 mcg/act inhaler Inhale 2 puffs every 6 (six) hours as needed for wheezing 54 g 0    amLODIPine (NORVASC) 5 mg tablet TAKE ONE TABLET BY MOUTH EVERY DAY 30 tablet 0    Choline Fenofibrate (Fenofibric Acid) 135 MG CPDR TAKE ONE CAPSULE BY MOUTH EVERY DAY 90 capsule 0    Creon 97927-86454 units capsule TAKE 1 CAPSULE OF 12,000 UNITS OF LIPASE BY MOUTH UP TO 3 TIMES A DAY AS NEEDED FOR SNACKS 90 capsule 0    famotidine (PEPCID) 40 MG tablet Take 40 mg by mouth daily at bedtime as needed       insulin degludec Girtha Camel FlexTouch) 100 units/mL injection pen 20 units subcut in hs      Insulin Pen Needle (Pen Needles) 32G X 5 MM MISC use once daily as directed      lisinopril (ZESTRIL) 20 mg tablet TAKE ONE TABLET BY MOUTH EVERY DAY 30 tablet 0    omega-3-acid ethyl esters (LOVAZA) 1 g capsule TAKE TWO CAPSULES BY MOUTH TWICE DAILY 360 capsule 0    ondansetron (ZOFRAN-ODT) 4 mg disintegrating tablet Take 1 tablet (4 mg total) by mouth every 8 (eight) hours as needed for nausea or vomiting 30 tablet 0    pregabalin (LYRICA) 100 mg capsule Take 1 capsule (100 mg total) by mouth 2 (two) times a day 60 capsule 0    rosuvastatin (CRESTOR) 40 MG tablet TAKE ONE TABLET BY MOUTH EVERY DAY 90 tablet 0    traZODone (DESYREL) 50 mg tablet Take 1 tablet (50 mg total) by mouth daily at bedtime 30 tablet 5    Creon 43390-994503 units CPEP TAKE ONE CAPSULE BY MOUTH THREE TIMES A DAY BEFORE MEALS 90 capsule 0    ergocalciferol (ERGOCALCIFEROL) 1 25 MG (15405 UT) capsule Take 50,000 Units by mouth      esomeprazole (NexIUM) 40 MG capsule TAKE ONE CAPSULE BY MOUTH TWICE DAILY before meals (Patient taking differently: Take 40 mg by mouth daily  ) 60 capsule 5    Evolocumab with Infusor (Repatha Pushtronex System) 420 MG/3 5ML SOCT Inject 420 mg under the skin every 28 days (Patient not taking: Reported on 10/13/2021)      Lidocaine-Menthol 4-1 % PTCH NuLido Patch 4 %-1 % topical   Apply to affected area twice a day   traMADol (ULTRAM) 50 mg tablet Take 1 tablet (50 mg total) by mouth every 8 (eight) hours as needed for severe pain 25 tablet 0     No current facility-administered medications for this visit  Allergies   Allergen Reactions    Bee Venom Swelling       Review of Systems    Video Exam    There were no vitals filed for this visit  Physical Exam     REVIEW OF SYSTEMS:    CONSTITUTIONAL: Denies any fever, chills, rigors, and weight loss  HEENT: No earache or tinnitus  Denies hearing loss or visual disturbances  CARDIOVASCULAR: No chest pain or palpitations  RESPIRATORY: Denies any cough, hemoptysis, shortness of breath or dyspnea on exertion  GASTROINTESTINAL: As noted in the History of Present Illness  GENITOURINARY: No problems with urination  Denies any hematuria or dysuria  NEUROLOGIC: No dizziness or vertigo, denies headaches  MUSCULOSKELETAL: Denies any muscle or joint pain  SKIN: Denies skin rashes or itching  ENDOCRINE: Denies excessive thirst  Denies intolerance to heat or cold  PSYCHOSOCIAL: Denies depression or anxiety  Denies any recent memory loss  PHYSICAL EXAMINATION:  Appearance and vitals taken from home devices    General Appearance:   Alert, cooperative, no distress   HEENT:  Normocephalic, atraumatic, anicteric  Neck supple, symmetrical, trachea midline  Lungs:   Equal chest rise and unlabored breathing, normal effort, no coughing  Abdomen:   No abdominal distension  Skin:   No jaundice, rashes, or lesions on visualized areas  Musculoskeletal:   Normal range of motion visualized  Psych:  Normal affect and normal insight  Neuro:  Alert and appropriate           I spent 20 minutes directly with the patient during this visit    VIRTUAL VISIT Carlos Moreno Adriana verbally agrees to participate in [employer]  Pt is aware that [employer] could be limited without vital signs or the ability to perform a full hands-on physical [employer] understands he or the provider may request at any time to terminate the video visit and request the patient to seek care or treatment in person

## 2022-01-31 DIAGNOSIS — I10 ESSENTIAL HYPERTENSION: ICD-10-CM

## 2022-02-01 DIAGNOSIS — I10 ESSENTIAL HYPERTENSION: ICD-10-CM

## 2022-02-01 RX ORDER — LISINOPRIL 20 MG/1
TABLET ORAL
Qty: 30 TABLET | Refills: 0 | Status: SHIPPED | OUTPATIENT
Start: 2022-02-01 | End: 2022-02-01 | Stop reason: SDUPTHER

## 2022-02-01 RX ORDER — AMLODIPINE BESYLATE 5 MG/1
TABLET ORAL
Qty: 30 TABLET | Refills: 0 | Status: SHIPPED | OUTPATIENT
Start: 2022-02-01 | End: 2022-02-01 | Stop reason: SDUPTHER

## 2022-02-02 RX ORDER — LISINOPRIL 20 MG/1
20 TABLET ORAL DAILY
Qty: 30 TABLET | Refills: 0 | Status: SHIPPED | OUTPATIENT
Start: 2022-02-02 | End: 2022-03-01

## 2022-02-02 RX ORDER — AMLODIPINE BESYLATE 5 MG/1
5 TABLET ORAL DAILY
Qty: 30 TABLET | Refills: 0 | Status: SHIPPED | OUTPATIENT
Start: 2022-02-02 | End: 2022-03-01

## 2022-02-10 DIAGNOSIS — E78.1 HYPERTRIGLYCERIDEMIA: ICD-10-CM

## 2022-02-10 DIAGNOSIS — E78.2 MIXED HYPERLIPIDEMIA: ICD-10-CM

## 2022-02-10 RX ORDER — FENOFIBRIC ACID 135 MG/1
CAPSULE, DELAYED RELEASE ORAL
Qty: 90 CAPSULE | Refills: 0 | Status: SHIPPED | OUTPATIENT
Start: 2022-02-10 | End: 2022-05-14

## 2022-02-14 ENCOUNTER — TELEPHONE (OUTPATIENT)
Dept: GASTROENTEROLOGY | Facility: CLINIC | Age: 47
End: 2022-02-14

## 2022-02-14 NOTE — TELEPHONE ENCOUNTER
Patients GI provider:  Dr Connor Celis    Number to return call: (  [phone]    Reason for call: Pt will need labs done prior to mri abdomen on 2-21-22, please enter and notify patient    Scheduled procedure/appointment date if applicable: Appt [id number]

## 2022-02-15 DIAGNOSIS — K85.90 ACUTE ON CHRONIC PANCREATITIS (HCC): Primary | ICD-10-CM

## 2022-02-15 DIAGNOSIS — K86.1 ACUTE ON CHRONIC PANCREATITIS (HCC): Primary | ICD-10-CM

## 2022-02-17 ENCOUNTER — OFFICE VISIT (OUTPATIENT)
Dept: FAMILY MEDICINE CLINIC | Facility: CLINIC | Age: 47
End: 2022-02-17
Payer: COMMERCIAL

## 2022-02-17 VITALS
TEMPERATURE: 97.2 F | BODY MASS INDEX: 30.38 KG/M2 | HEART RATE: 88 BPM | SYSTOLIC BLOOD PRESSURE: 122 MMHG | HEIGHT: 71 IN | OXYGEN SATURATION: 97 % | DIASTOLIC BLOOD PRESSURE: 80 MMHG | WEIGHT: 217 LBS

## 2022-02-17 DIAGNOSIS — E78.1 HYPERTRIGLYCERIDEMIA: ICD-10-CM

## 2022-02-17 DIAGNOSIS — K86.1 CHRONIC PANCREATITIS, UNSPECIFIED PANCREATITIS TYPE (HCC): ICD-10-CM

## 2022-02-17 DIAGNOSIS — K86.9 PANCREATIC LESION: ICD-10-CM

## 2022-02-17 DIAGNOSIS — K85.90 ACUTE ON CHRONIC PANCREATITIS (HCC): ICD-10-CM

## 2022-02-17 DIAGNOSIS — M79.671 PAIN IN BOTH FEET: Primary | ICD-10-CM

## 2022-02-17 DIAGNOSIS — M79.672 PAIN IN BOTH FEET: Primary | ICD-10-CM

## 2022-02-17 DIAGNOSIS — I10 PRIMARY HYPERTENSION: ICD-10-CM

## 2022-02-17 DIAGNOSIS — K86.1 ACUTE ON CHRONIC PANCREATITIS (HCC): ICD-10-CM

## 2022-02-17 PROCEDURE — 3725F SCREEN DEPRESSION PERFORMED: CPT | Performed by: FAMILY MEDICINE

## 2022-02-17 PROCEDURE — 3008F BODY MASS INDEX DOCD: CPT | Performed by: FAMILY MEDICINE

## 2022-02-17 PROCEDURE — 99214 OFFICE O/P EST MOD 30 MIN: CPT | Performed by: FAMILY MEDICINE

## 2022-02-17 NOTE — PROGRESS NOTES
BMI Counseling: Body mass index is 30 27 kg/m²  The BMI is above normal  Nutrition recommendations include decreasing portion sizes, decreasing fast food intake, limiting drinks that contain sugar, moderation in carbohydrate intake, increasing intake of lean protein, reducing intake of saturated and trans fat and reducing intake of cholesterol  Exercise recommendations include moderate physical activity 150 minutes/week and exercising 3-5 times per week  No pharmacotherapy was ordered  Rationale for BMI follow-up plan is due to patient being overweight or obese  Depression Screening and Follow-up Plan: Clincally patient does not have depression  No treatment is required  Assessment/Plan:     Chronic Problems:  Hypertension  Stable, with temporary continue current blood pressure medicine    Uncomplicated opioid dependence ([place] [id number] )  Currently in acute pain management      Visit Diagnosis:  Diagnoses and all orders for this visit:    Pain in both feet  Comments:  Referral made for Podiatry  Orders:  -     Ambulatory Referral to Podiatry; Future  -     CBC and differential; Future  -     Comprehensive metabolic panel; Future  -     TSH, 3rd generation; Future  -     Lipid panel; Future  -     UA w Reflex to Microscopic w Reflex to Culture -Lab Collect; Future    Chronic pancreatitis, unspecified pancreatitis type ([place] [id number] )  Comments:  Stable, presently a asymptomatic continue follow GI  Orders:  -     CBC and differential; Future  -     Comprehensive metabolic panel; Future  -     TSH, 3rd generation; Future  -     Lipid panel; Future  -     UA w Reflex to Microscopic w Reflex to Culture -Lab Collect; Future    Acute on chronic pancreatitis (HCC)  -     CBC and differential; Future  -     Comprehensive metabolic panel; Future  -     TSH, 3rd generation; Future  -     Lipid panel; Future  -     UA w Reflex to Microscopic w Reflex to Culture -Lab Collect;  Future    Pancreatic lesion  Comments:  Maintain scheduled follow-up with specialist in regard to additional studies  Orders:  -     CBC and differential; Future  -     Comprehensive metabolic panel; Future  -     TSH, 3rd generation; Future  -     Lipid panel; Future  -     UA w Reflex to Microscopic w Reflex to Culture -Lab Collect; Future    Primary hypertension  -     CBC and differential; Future  -     Comprehensive metabolic panel; Future  -     TSH, 3rd generation; Future  -     Lipid panel; Future  -     UA w Reflex to Microscopic w Reflex to Culture -Lab Collect; Future    Hypertriglyceridemia  -     CBC and differential; Future  -     Comprehensive metabolic panel; Future  -     TSH, 3rd generation; Future  -     Lipid panel; Future  -     UA w Reflex to Microscopic w Reflex to Culture -Lab Collect; Future          Subjective:    Patient ID: Franco Swain is a 48 y o  male  Feet are absolutely killing me   For the past weeks  All the time , typical foot wear , red wing work boots   Continues care with pain management regard to chronic back pain, has received injections  Pancreas chronic , has scheduled mri and f/u with spec   Continues with gi   Hypertriglyceride, med s, taking daily , tresiba , diet   Negative chest pain palpitations shortness of breath difficulty breathing cough lesions rash        The following portions of the patient's history were reviewed and updated as appropriate: allergies, current medications, past family history, past medical history, past social history, past surgical history and problem list     Review of Systems   Constitutional: Negative for appetite change, chills, fever and unexpected weight change  HENT: Negative for congestion, dental problem, ear pain, hearing loss, postnasal drip, rhinorrhea, sinus pressure, sinus pain, sneezing, sore throat, tinnitus and voice change  Eyes: Negative for visual disturbance  Respiratory: Negative for apnea, cough, chest tightness and shortness of breath      Cardiovascular: Negative for chest pain, palpitations and leg swelling  Gastrointestinal: Negative for abdominal pain, blood in stool, constipation, diarrhea, nausea and vomiting  Endocrine: Negative for cold intolerance, heat intolerance, polydipsia, polyphagia and polyuria  Genitourinary: Negative for decreased urine volume, difficulty urinating, dysuria, frequency and hematuria  Musculoskeletal: Negative for arthralgias, back pain, gait problem, joint swelling and myalgias  Skin: Negative for color change, rash and wound  Allergic/Immunologic: Negative for environmental allergies and food allergies  Neurological: Negative for dizziness, syncope, weakness, light-headedness, numbness and headaches  Hematological: Negative for adenopathy  Does not bruise/bleed easily  Psychiatric/Behavioral: Negative for sleep disturbance and suicidal ideas  The patient is not nervous/anxious            /80   Pulse 88   Temp (!) 97 2 °F (36 2 °C)   Ht 5' 11" (1 803 m)   Wt 98 4 kg (217 lb)   SpO2 97%   BMI 30 27 kg/m²   Social History     Socioeconomic History    Marital status: /Civil Union     Spouse name: Not on file    Number of children: Not on file    Years of education: Not on file    Highest education level: Not on file   Occupational History    Occupation: fulltime employment   Tobacco Use    Smoking status: Former Smoker     Packs/day: 1 00     Years: 20 00     Pack years: 20 00     Quit date: 3/15/2021     Years since quittin 9    Smokeless tobacco: Never Used   Vaping Use    Vaping Use: Never used   Substance and Sexual Activity    Alcohol use: Not Currently     Alcohol/week: 10 0 standard drinks     Types: 10 Cans of beer per week     Comment: quit    Drug use: No    Sexual activity: Yes     Partners: Female   Other Topics Concern    Not on file   Social History Narrative    Lives with family    Daily caffeine consumption     Social Determinants of Health     Financial Resource Strain: Not on file   Food Insecurity: Not on file   Transportation Needs: Not on file   Physical Activity: Not on file   Stress: Not on file   Social Connections: Not on file   Intimate Partner Violence: Not on file   Housing Stability: Not on file     Past Medical History:   Diagnosis Date    Asthma     Chronic pain disorder     GERD (gastroesophageal reflux disease)     Hyperlipidemia     Liver abscess     Meniscus tear     left knee work injury last assessed 08/24/2016    Pancreatitis     Pneumonia      Family History   Problem Relation Age of Onset    Cirrhosis Mother     Heart disease Other         cardiac disorder    Cancer Other      Past Surgical History:   Procedure Laterality Date    CELIAC PLEXUS BLOCK Bilateral 10/29/2018    Procedure: SPLANCHNIC NERVE BLOCK;  Surgeon: Chance Castillo MD;  [facility];  Service: Pain Management     CELIAC PLEXUS BLOCK Bilateral 1/10/2019    Procedure: SPLANCHNIC NERVE BLOCK;  Surgeon: Chance Castillo MD;  [facility];  Service: Pain Management     CHOLECYSTECTOMY      ESOPHAGOGASTRODUODENOSCOPY N/A 11/16/2017    Procedure: ESOPHAGOGASTRODUODENOSCOPY (EGD); Surgeon: Chaz Diaz MD;  [facility]; Service: Gastroenterology    ESOPHAGOGASTRODUODENOSCOPY N/A 4/19/2018    Procedure: ESOPHAGOGASTRODUODENOSCOPY (EGD); Surgeon: Diego Sanders MD;  [facility]; Service: Gastroenterology    ESOPHAGOGASTRODUODENOSCOPY N/A 5/10/2018    Procedure: ESOPHAGOGASTRODUODENOSCOPY (EGD); Surgeon: Fe Craig MD;  [facility];   Service: Gastroenterology    IR TEMPORARY DIALYSIS CATHETER PLACEMENT  2/28/2019    KNEE ARTHROSCOPY Bilateral     KNEE ARTHROSCOPY Right 2009    cibishino last assessed 08/24/2016    KNEE ARTHROSCOPY Right 07/01/2019    LIVER SURGERY      NERVE BLOCK Bilateral 5/29/2018    Procedure: SPLANCHNIC NERVE BLOCK;  Surgeon: Chance Castillo MD;  [facility];  Service: Pain Management     PANCREAS SURGERY stents    PANCREATIC CYST EXCISION      AK INJECT NERV BLCK,CELIAC PLEXUS Bilateral 5/9/2017    Procedure: CELIAC PLEXUS BLOCK ;  Surgeon: Madhuri Marcelo MD;  [facility];  Service: Pain Management     AK INJECT NERV BLCK,CELIAC PLEXUS Bilateral 6/1/2017    Procedure: SPLANCHNIC NERVE BLOCK at T12;  Surgeon: Madhuri Marcelo MD;  [facility];  Service: Pain Management     AK INJECT NERV BLCK,CELIAC PLEXUS Bilateral 8/8/2017    Procedure: BILATERAL SPLANCHNIC NERVE BLOCK T12;  Surgeon: Madhuri Marcelo MD;  [facility];  Service: Pain Management     AK INJECT NERV BLCK,CELIAC PLEXUS Bilateral 4/18/2019    Procedure: BLOCK / INJECTION CELIAC PLEXUS;  Surgeon: Madhuri Marcelo MD;  [facility];  Service: Pain Management     AK INJECT NERV [address] Bilateral 8/20/2019    Procedure: SPLANCHNIC NERVE BLOCK;  Surgeon: Madhuri Marcelo MD;  [facility];  Service: Pain Management     AK INJECT NERV [address] Bilateral 10/17/2019    Procedure: SPLANCHNIC NERVE BLOCK;  Surgeon: Madhuri Marcelo MD;  [facility];  Service: Pain Management     AK INJECT NERV BLCK,PARAVERT SYMPATH Bilateral 12/28/2017    Procedure: SPLANCHNIC NERVE BLOCK;  Surgeon: Madhuri Marcelo MD;  [facility];  Service: Pain Management     AK LAP,CHOLECYSTECTOMY N/A 2/14/2017    Procedure: LAPAROSCOPIC CHOLECYSTECTOMY, IOC, POSSIBLE OPEN ;  Surgeon: Traci Baldwin MD;  [facility];  Service: General    ROTATOR CUFF REPAIR Right     SHOULDER ARTHROSCOPY      SHOULDER ARTHROSCOPY Right        Current Outpatient Medications:     albuterol (PROVENTIL HFA,VENTOLIN HFA) 90 mcg/act inhaler, Inhale 2 puffs every 6 (six) hours as needed for wheezing, Disp: 54 g, Rfl: 0    amLODIPine (NORVASC) 5 mg tablet, Take 1 tablet (5 mg total) by mouth daily, Disp: 30 tablet, Rfl: 0    Choline Fenofibrate (Fenofibric Acid) 135 MG CPDR, TAKE ONE CAPSULE BY MOUTH EVERY DAY, Disp: 80 capsule, Rfl: 0    Creon 25080-83372 units capsule, TAKE 1 CAPSULE OF 12,000 UNITS OF LIPASE BY MOUTH UP TO 3 TIMES A DAY AS NEEDED FOR SNACKS, Disp: 90 capsule, Rfl: 0    Creon 08709-025446 units CPEP, TAKE ONE CAPSULE BY MOUTH THREE TIMES A DAY BEFORE MEALS, Disp: 90 capsule, Rfl: 0    esomeprazole (NexIUM) 40 MG capsule, TAKE ONE CAPSULE BY MOUTH TWICE DAILY before meals (Patient taking differently: Take 40 mg by mouth daily  ), Disp: 60 capsule, Rfl: 5    famotidine (PEPCID) 40 MG tablet, Take 40 mg by mouth daily at bedtime as needed , Disp: , Rfl:     insulin degludec Ardell Erick FlexTouch) 100 units/mL injection pen, 20 units subcut in hs, Disp: , Rfl:     Insulin Pen Needle (Pen Needles) 32G X 5 MM MISC, use once daily as directed, Disp: , Rfl:     lisinopril (ZESTRIL) 20 mg tablet, Take 1 tablet (20 mg total) by mouth daily, Disp: 30 tablet, Rfl: 0    omega-3-acid ethyl esters (LOVAZA) 1 g capsule, TAKE TWO CAPSULES BY MOUTH TWICE DAILY, Disp: 360 capsule, Rfl: 0    ondansetron (ZOFRAN-ODT) 4 mg disintegrating tablet, Take 1 tablet (4 mg total) by mouth every 8 (eight) hours as needed for nausea or vomiting, Disp: 30 tablet, Rfl: 0    pregabalin (LYRICA) 100 mg capsule, Take 1 capsule (100 mg total) by mouth 2 (two) times a day, Disp: 60 capsule, Rfl: 0    rosuvastatin (CRESTOR) 40 MG tablet, TAKE ONE TABLET BY MOUTH EVERY DAY, Disp: 90 tablet, Rfl: 0    traZODone (DESYREL) 50 mg tablet, Take 1 tablet (50 mg total) by mouth daily at bedtime, Disp: 30 tablet, Rfl: 5    ergocalciferol (ERGOCALCIFEROL) 1 25 MG (04562 UT) capsule, Take 50,000 Units by mouth, Disp: , Rfl:     Evolocumab with Infusor (Repatha Pushtronex System) 420 MG/3 5ML SOCT, Inject 420 mg under the skin every 28 days (Patient not taking: Reported on 10/13/2021), Disp: , Rfl:     Lidocaine-Menthol 4-1 % PTCH, NuLido Patch 4 %-1 % topical  Apply to affected area twice a day , Disp: , Rfl:     traMADol (ULTRAM) 50 mg tablet, Take 1 tablet (50 mg total) by mouth every 8 (eight) hours as needed for severe pain, Disp: 25 tablet, Rfl: 0    Allergies   Allergen Reactions    Bee Venom Swelling          Lab Review   Telemedicine on 01/13/2022   Component Date Value    SARS-CoV-2 01/13/2022 Positive*   Admission on 09/12/2021, Discharged on 09/14/2021   Component Date Value    WBC 09/12/2021 6 62     RBC 09/12/2021 4 40     Hemoglobin 09/12/2021 14 3     Hematocrit 09/12/2021 41 9     MCV 09/12/2021 95     MCH 09/12/2021 32 5     MCHC 09/12/2021 34 1     RDW 09/12/2021 12 8     MPV 09/12/2021 10 9     Platelets 89/74/7373 167     nRBC 09/12/2021 0     Neutrophils Relative 09/12/2021 51     Immat GRANS % 09/12/2021 0     Lymphocytes Relative 09/12/2021 36     Monocytes Relative 09/12/2021 10     Eosinophils Relative 09/12/2021 2     Basophils Relative 09/12/2021 1     Neutrophils Absolute 09/12/2021 3 42     Immature Grans Absolute 09/12/2021 0 01     Lymphocytes Absolute 09/12/2021 2 41     Monocytes Absolute 09/12/2021 0 64     Eosinophils Absolute 09/12/2021 0 10     Basophils Absolute 09/12/2021 0 04     Total Bilirubin 09/12/2021 1 44*    Bilirubin, Direct 09/12/2021 0 29*    Alkaline Phosphatase 09/12/2021 115     AST 09/12/2021 108*    ALT 09/12/2021 88*    Total Protein 09/12/2021 7 1     Albumin 09/12/2021 3 8     Sodium 09/12/2021 138     Potassium 09/12/2021 3 1*    Chloride 09/12/2021 100     CO2 09/12/2021 23     ANION GAP 09/12/2021 15*    BUN 09/12/2021 15     Creatinine 09/12/2021 0 82     Glucose 09/12/2021 142*    Calcium 09/12/2021 8 5     eGFR 09/12/2021 105     Lipase 09/12/2021 79     Troponin I 09/12/2021 <0 02     Color, UA 09/13/2021 Yellow     Clarity, UA 09/13/2021 Clear     Specific Gravity, UA 09/13/2021 1 010     pH, UA 09/13/2021 5 0     Leukocytes, UA 09/13/2021 Negative     Nitrite, UA 09/13/2021 Negative     Protein, UA 09/13/2021 Negative     Glucose, UA 09/13/2021 Negative     Ketones, UA 09/13/2021 Negative     Urobilinogen, UA 09/13/2021 0 2     Bilirubin, UA 09/13/2021 Negative     Blood, UA 09/13/2021 Negative     Triglycerides 09/12/2021 1,116*    LACTIC ACID 09/12/2021 1 1     POC Glucose 09/13/2021 118     Sodium 09/13/2021 140     Potassium 09/13/2021 3 9     Chloride 09/13/2021 105     CO2 09/13/2021 24     ANION GAP 09/13/2021 11     BUN 09/13/2021 10     Creatinine 09/13/2021 0 77     Glucose 09/13/2021 122     Calcium 09/13/2021 8 1*    eGFR 09/13/2021 107     POC Glucose 09/13/2021 110     POC Glucose 09/13/2021 130     Ventricular Rate 09/13/2021 70     Atrial Rate 09/13/2021 70     MT Interval 09/13/2021 146     QRSD Interval 09/13/2021 114     QT Interval 09/13/2021 424     QTC Interval 09/13/2021 457     P Axis 09/13/2021 41     QRS Axis 09/13/2021 47     T Wave Axis 09/13/2021 46     POC Glucose 09/13/2021 117     Cholesterol 09/14/2021 171     Triglycerides 09/14/2021 672*    HDL, Direct 09/14/2021 23*    LDL Calculated 09/14/2021      Non-HDL-Chol (CHOL-HDL) 09/14/2021 148     POC Glucose 09/14/2021 124     POC Glucose 09/14/2021 116    Admission on 08/27/2021, Discharged on 08/27/2021   Component Date Value    WBC 08/27/2021 5 36     RBC 08/27/2021 4 35     Hemoglobin 08/27/2021 14 2     Hematocrit 08/27/2021 40 7     MCV 08/27/2021 94     MCH 08/27/2021 32 6     MCHC 08/27/2021 34 9     RDW 08/27/2021 12 6     MPV 08/27/2021 10 4     Platelets 57/37/2990 129*    nRBC 08/27/2021 0     Neutrophils Relative 08/27/2021 62     Immat GRANS % 08/27/2021 0     Lymphocytes Relative 08/27/2021 28     Monocytes Relative 08/27/2021 8     Eosinophils Relative 08/27/2021 1     Basophils Relative 08/27/2021 1     Neutrophils Absolute 08/27/2021 3 32     Immature Grans Absolute 08/27/2021 0 02     Lymphocytes Absolute 08/27/2021 1 49     Monocytes Absolute 08/27/2021 0 44     Eosinophils Absolute 08/27/2021 0 06  Basophils Absolute 08/27/2021 0 03     Sodium 08/27/2021 137     Potassium 08/27/2021 5 1     Chloride 08/27/2021 100     CO2 08/27/2021 25     ANION GAP 08/27/2021 12     BUN 08/27/2021 16     Creatinine 08/27/2021 0 80     Glucose 08/27/2021 151*    Calcium 08/27/2021 9 2     AST 08/27/2021 147*    ALT 08/27/2021 153*    Alkaline Phosphatase 08/27/2021 166*    Total Protein 08/27/2021 7 5     Albumin 08/27/2021 4 0     Total Bilirubin 08/27/2021 0 72     eGFR 08/27/2021 106     Lipase 08/27/2021 93     Triglycerides 08/27/2021 1,365*   Orders Only on 08/24/2021   Component Date Value    Triglycerides 08/24/2021 3,135*    Hemoglobin A1C 08/24/2021 5 3         Imaging: No results found  Objective:     Physical Exam  Constitutional:       General: He is not in acute distress  Appearance: He is not ill-appearing or toxic-appearing  HENT:      Head: Normocephalic and atraumatic  Eyes:      General: No scleral icterus  Cardiovascular:      Pulses: Normal pulses  Pulmonary:      Effort: Pulmonary effort is normal    Musculoskeletal:         General: Tenderness (Plantar surfaces of bilateral feet, no apparent rashes deformities) present  Lymphadenopathy:      Cervical: No cervical adenopathy  Neurological:      Mental Status: He is oriented to person, place, and time  There are no Patient Instructions on file for this visit  LUANA Thomas    Portions of the record may have been created with voice recognition software  Occasional wrong word or "sound a like" substitutions may have occurred due to the inherent limitations of voice recognition software  Read the chart carefully and recognize, using context, where substitutions have occurred

## 2022-02-18 ENCOUNTER — TELEPHONE (OUTPATIENT)
Dept: GASTROENTEROLOGY | Facility: CLINIC | Age: 47
End: 2022-02-18

## 2022-02-18 DIAGNOSIS — G89.4 CHRONIC PAIN SYNDROME: ICD-10-CM

## 2022-02-18 NOTE — TELEPHONE ENCOUNTER
----- Message from [id number] sent at 2/15/2022 10:01 AM EST -----    ----- Message -----  From: Nannette Tobin MD  Sent: 1/27/2022   5:27 PM EST  To: [facility]    Please scheduel patient for MRI/ MRCP and EUS/ celiac block  He can be done in next 3 - 4 weeks  Thank you

## 2022-02-18 NOTE — TELEPHONE ENCOUNTER
Luis Freeman ok'd to schedule in  4 at 7:30am      Scheduled date of EUS Celiac Block(as of today):  3/15/22  Physician performing EUS Celiac Block: Dr Gibbs  Location of EUS Celiac Block: [facility]  Instructions reviewed with patient by: Cynthia/catarino  Clearances: N/A

## 2022-02-20 DIAGNOSIS — G89.4 CHRONIC PAIN SYNDROME: ICD-10-CM

## 2022-02-21 RX ORDER — PREGABALIN 100 MG/1
100 CAPSULE ORAL 2 TIMES DAILY
Qty: 60 CAPSULE | Refills: 0 | Status: SHIPPED | OUTPATIENT
Start: 2022-02-21

## 2022-02-21 RX ORDER — PREGABALIN 100 MG/1
CAPSULE ORAL
Qty: 60 CAPSULE | Refills: 0 | Status: SHIPPED | OUTPATIENT
Start: 2022-02-21 | End: 2022-05-19 | Stop reason: ALTCHOICE

## 2022-02-28 DIAGNOSIS — R14.0 ABDOMINAL DISTENTION: ICD-10-CM

## 2022-02-28 DIAGNOSIS — K59.00 CONSTIPATION, ACUTE: ICD-10-CM

## 2022-02-28 DIAGNOSIS — K86.1 CHRONIC PANCREATITIS, UNSPECIFIED PANCREATITIS TYPE (HCC): ICD-10-CM

## 2022-03-01 DIAGNOSIS — I10 ESSENTIAL HYPERTENSION: ICD-10-CM

## 2022-03-01 RX ORDER — AMLODIPINE BESYLATE 5 MG/1
TABLET ORAL
Qty: 30 TABLET | Refills: 0 | Status: SHIPPED | OUTPATIENT
Start: 2022-03-01 | End: 2022-04-03

## 2022-03-01 RX ORDER — LISINOPRIL 20 MG/1
TABLET ORAL
Qty: 30 TABLET | Refills: 0 | Status: SHIPPED | OUTPATIENT
Start: 2022-03-01 | End: 2022-04-03

## 2022-03-01 RX ORDER — PANCRELIPASE 36000; 180000; 114000 [USP'U]/1; [USP'U]/1; [USP'U]/1
CAPSULE, DELAYED RELEASE PELLETS ORAL
Qty: 90 CAPSULE | Refills: 0 | Status: SHIPPED | OUTPATIENT
Start: 2022-03-01 | End: 2022-06-06

## 2022-03-14 ENCOUNTER — TELEPHONE (OUTPATIENT)
Dept: OTHER | Facility: OTHER | Age: 47
End: 2022-03-14

## 2022-03-14 NOTE — TELEPHONE ENCOUNTER
Patient was refused for his MRI appointment on 3/14/2022 due to being late  Patient would like to know if he has to reschedule his colonoscopy

## 2022-03-15 ENCOUNTER — ANESTHESIA EVENT (OUTPATIENT)
Dept: GASTROENTEROLOGY | Facility: HOSPITAL | Age: 47
End: 2022-03-15

## 2022-03-15 ENCOUNTER — HOSPITAL ENCOUNTER (OUTPATIENT)
Dept: GASTROENTEROLOGY | Facility: HOSPITAL | Age: 47
Setting detail: OUTPATIENT SURGERY
Discharge: HOME | End: 2022-03-15
Attending: INTERNAL MEDICINE | Admitting: INTERNAL MEDICINE
Payer: COMMERCIAL

## 2022-03-15 ENCOUNTER — ANESTHESIA (OUTPATIENT)
Dept: GASTROENTEROLOGY | Facility: HOSPITAL | Age: 47
End: 2022-03-15

## 2022-03-15 VITALS
BODY MASS INDEX: 30.38 KG/M2 | SYSTOLIC BLOOD PRESSURE: 127 MMHG | TEMPERATURE: 96.6 F | HEIGHT: 71 IN | RESPIRATION RATE: 18 BRPM | OXYGEN SATURATION: 97 % | HEART RATE: 67 BPM | WEIGHT: 217 LBS | DIASTOLIC BLOOD PRESSURE: 69 MMHG

## 2022-03-15 DIAGNOSIS — K85.90 ACUTE ON CHRONIC PANCREATITIS (HCC): ICD-10-CM

## 2022-03-15 DIAGNOSIS — K86.1 ACUTE ON CHRONIC PANCREATITIS (HCC): ICD-10-CM

## 2022-03-15 DIAGNOSIS — K85.90 PANCREATITIS, UNSPECIFIED PANCREATITIS TYPE: ICD-10-CM

## 2022-03-15 DIAGNOSIS — R10.10 UPPER ABDOMINAL PAIN: ICD-10-CM

## 2022-03-15 LAB — GLUCOSE SERPL-MCNC: 117 MG/DL (ref 65–140)

## 2022-03-15 PROCEDURE — 82948 REAGENT STRIP/BLOOD GLUCOSE: CPT

## 2022-03-15 PROCEDURE — 43253 EGD US TRANSMURAL INJXN/MARK: CPT | Performed by: INTERNAL MEDICINE

## 2022-03-15 RX ORDER — PROPOFOL 10 MG/ML
INJECTION, EMULSION INTRAVENOUS AS NEEDED
Status: DISCONTINUED | OUTPATIENT
Start: 2022-03-15 | End: 2022-03-15

## 2022-03-15 RX ORDER — CIPROFLOXACIN 2 MG/ML
INJECTION, SOLUTION INTRAVENOUS CONTINUOUS PRN
Status: DISCONTINUED | OUTPATIENT
Start: 2022-03-15 | End: 2022-03-15

## 2022-03-15 RX ORDER — SODIUM CHLORIDE, SODIUM LACTATE, POTASSIUM CHLORIDE, CALCIUM CHLORIDE 600; 310; 30; 20 MG/100ML; MG/100ML; MG/100ML; MG/100ML
INJECTION, SOLUTION INTRAVENOUS CONTINUOUS PRN
Status: DISCONTINUED | OUTPATIENT
Start: 2022-03-15 | End: 2022-03-15

## 2022-03-15 RX ORDER — CIPROFLOXACIN 2 MG/ML
400 INJECTION, SOLUTION INTRAVENOUS ONCE
Status: CANCELLED | OUTPATIENT
Start: 2022-03-15 | End: 2022-03-15

## 2022-03-15 RX ORDER — BUPIVACAINE HYDROCHLORIDE 2.5 MG/ML
20 INJECTION, SOLUTION EPIDURAL; INFILTRATION; INTRACAUDAL ONCE
Status: COMPLETED | OUTPATIENT
Start: 2022-03-15 | End: 2022-03-15

## 2022-03-15 RX ADMIN — PROPOFOL 50 MG: 10 INJECTION, EMULSION INTRAVENOUS at 08:48

## 2022-03-15 RX ADMIN — PROPOFOL 50 MG: 10 INJECTION, EMULSION INTRAVENOUS at 08:53

## 2022-03-15 RX ADMIN — PROPOFOL 100 MG: 10 INJECTION, EMULSION INTRAVENOUS at 08:42

## 2022-03-15 RX ADMIN — PROPOFOL 50 MG: 10 INJECTION, EMULSION INTRAVENOUS at 09:14

## 2022-03-15 RX ADMIN — PROPOFOL 100 MG: 10 INJECTION, EMULSION INTRAVENOUS at 08:43

## 2022-03-15 RX ADMIN — CIPROFLOXACIN: 2 INJECTION, SOLUTION INTRAVENOUS at 08:40

## 2022-03-15 RX ADMIN — PROPOFOL 200 MG: 10 INJECTION, EMULSION INTRAVENOUS at 08:41

## 2022-03-15 RX ADMIN — PROPOFOL 50 MG: 10 INJECTION, EMULSION INTRAVENOUS at 08:55

## 2022-03-15 RX ADMIN — PROPOFOL 50 MG: 10 INJECTION, EMULSION INTRAVENOUS at 08:59

## 2022-03-15 RX ADMIN — PROPOFOL 50 MG: 10 INJECTION, EMULSION INTRAVENOUS at 08:50

## 2022-03-15 RX ADMIN — PROPOFOL 50 MG: 10 INJECTION, EMULSION INTRAVENOUS at 09:08

## 2022-03-15 RX ADMIN — PROPOFOL 50 MG: 10 INJECTION, EMULSION INTRAVENOUS at 09:02

## 2022-03-15 RX ADMIN — PROPOFOL 50 MG: 10 INJECTION, EMULSION INTRAVENOUS at 08:46

## 2022-03-15 RX ADMIN — BUPIVACAINE HYDROCHLORIDE 20 ML: 2.5 INJECTION, SOLUTION EPIDURAL; INFILTRATION; INTRACAUDAL; PERINEURAL at 09:11

## 2022-03-15 RX ADMIN — PROPOFOL 50 MG: 10 INJECTION, EMULSION INTRAVENOUS at 08:57

## 2022-03-15 RX ADMIN — PROPOFOL 50 MG: 10 INJECTION, EMULSION INTRAVENOUS at 09:05

## 2022-03-15 RX ADMIN — TRIAMCINOLONE ACETONIDE 80 MG: 10 INJECTION, SUSPENSION INTRA-ARTICULAR; INTRALESIONAL at 09:11

## 2022-03-15 RX ADMIN — SODIUM CHLORIDE, SODIUM LACTATE, POTASSIUM CHLORIDE, AND CALCIUM CHLORIDE: .6; .31; .03; .02 INJECTION, SOLUTION INTRAVENOUS at 08:14

## 2022-03-15 RX ADMIN — PROPOFOL 100 MG: 10 INJECTION, EMULSION INTRAVENOUS at 09:11

## 2022-03-15 NOTE — H&P
History and Physical - [facility]  Dev Miranda 50 y o  male MRN: [id number]    HPI: Dev Miranda is a 50y o  year old male who presents with chronic pancreatitis  Review of Systems    Historical Information   Past Medical History:   Diagnosis Date    Asthma     Chronic pain disorder     GERD (gastroesophageal reflux disease)     Hyperlipidemia     Liver abscess     Meniscus tear     left knee work injury last assessed 08/24/2016    Pancreatitis     Pneumonia      Past Surgical History:   Procedure Laterality Date    CELIAC PLEXUS BLOCK Bilateral 10/29/2018    Procedure: SPLANCHNIC NERVE BLOCK;  Surgeon: Colton Lu MD;  [facility];  Service: Pain Management     CELIAC PLEXUS BLOCK Bilateral 1/10/2019    Procedure: SPLANCHNIC NERVE BLOCK;  Surgeon: Colton Lu MD;  [facility];  Service: Pain Management     CHOLECYSTECTOMY      ESOPHAGOGASTRODUODENOSCOPY N/A 11/16/2017    Procedure: ESOPHAGOGASTRODUODENOSCOPY (EGD); Surgeon: Blank Bender MD;  [facility]; Service: Gastroenterology    ESOPHAGOGASTRODUODENOSCOPY N/A 4/19/2018    Procedure: ESOPHAGOGASTRODUODENOSCOPY (EGD); Surgeon: Jules Lopez MD;  [facility]; Service: Gastroenterology    ESOPHAGOGASTRODUODENOSCOPY N/A 5/10/2018    Procedure: ESOPHAGOGASTRODUODENOSCOPY (EGD); Surgeon: Anitra Bullock MD;  [facility];   Service: Gastroenterology    IR TEMPORARY DIALYSIS CATHETER PLACEMENT  2/28/2019    KNEE ARTHROSCOPY Bilateral     KNEE ARTHROSCOPY Right 2009    cibishino last assessed 08/24/2016    KNEE ARTHROSCOPY Right 07/01/2019    LIVER SURGERY      NERVE BLOCK Bilateral 5/29/2018    Procedure: SPLANCHNIC NERVE BLOCK;  Surgeon: Colton Lu MD;  [facility];  Service: Pain Management     PANCREAS SURGERY      stents    PANCREATIC CYST EXCISION      AK INJECT MARKEL [address] [place] [address] Street Bilateral 5/9/2017    Procedure: CELIAC PLEXUS BLOCK ; Surgeon: Sherron Crespo MD;  [facility];  Service: Pain Management     DC INJECT NERV BLCK,CELIAC PLEXUS Bilateral 2017    Procedure: SPLANCHNIC NERVE BLOCK at T12;  Surgeon: Sherron Crespo MD;  [facility];  Service: Pain Management     DC INJECT NERV BLCK,CELIAC PLEXUS Bilateral 2017    Procedure: BILATERAL SPLANCHNIC NERVE BLOCK T12;  Surgeon: Sherron Crespo MD;  [facility];  Service: Pain Management     DC INJECT NERV BLCK,CELIAC PLEXUS Bilateral 2019    Procedure: BLOCK / INJECTION CELIAC PLEXUS;  Surgeon: Sherron Crespo MD;  [facility];  Service: Pain Management     DC INJECT NERV BLCK,CELIAC PLEXUS Bilateral 2019    Procedure: SPLANCHNIC NERVE BLOCK;  Surgeon: Sherron Crespo MD;  [facility];  Service: Pain Management     DC INJECT NERV BLCK,CELIAC PLEXUS Bilateral 10/17/2019    Procedure: SPLANCHNIC NERVE BLOCK;  Surgeon: Sherorn Crespo MD;  [facility];  Service: Pain Management     DC INJECT NERV [address] Bilateral 2017    Procedure: SPLANCHNIC NERVE BLOCK;  Surgeon: Sherron Crespo MD;  [facility];  Service: Pain Management     DC LAP,CHOLECYSTECTOMY N/A 2017    Procedure: LAPAROSCOPIC CHOLECYSTECTOMY, IOC, POSSIBLE OPEN ;  Surgeon: Rambo Dumont MD;  [facility];  Service: General    ROTATOR CUFF REPAIR Right     SHOULDER ARTHROSCOPY      SHOULDER ARTHROSCOPY Right      Social History   Social History     Substance and Sexual Activity   Alcohol Use Yes    Alcohol/week: 10 0 standard drinks    Types: 10 Cans of beer per week    Comment: on weekends     Social History     Substance and Sexual Activity   Drug Use No     Social History     Tobacco Use   Smoking Status Former Smoker    Packs/day: 1 00    Years: 20 00    Pack years: 20 00    Quit date: 3/15/2021    Years since quittin 0   Smokeless Tobacco Never Used     Family History   Problem Relation Age of Onset    Cirrhosis Mother     Heart disease Other         cardiac disorder    Cancer Other        Meds/Allergies     (Not in a hospital admission)      Allergies   Allergen Reactions    Bee Venom Swelling       Objective     /67   Pulse 69   Temp (!) 97 2 °F (36 2 °C) (Tympanic)   Resp 18   Ht 5' 11" (1 803 m)   Wt 98 4 kg (217 lb)   SpO2 95%   BMI 30 27 kg/m²       PHYSICAL EXAM    Gen: NAD  CV: RRR  CHEST: Clear  ABD: soft, NT/ND  EXT: no edema  Neuro: AAO      ASSESSMENT/PLAN:  This is a 50y o  year old male here for eus with CPB for chronic pancretitis       PLAN:   Procedure: eus/ CPB

## 2022-03-15 NOTE — ANESTHESIA POSTPROCEDURE EVALUATION
Post-Op Assessment Note    CV Status:  Stable  Pain Score: 0    Pain management: adequate     Mental Status:  Awake   Hydration Status:  Stable   PONV Controlled:  None   Airway Patency:  Patent      Post Op Vitals Reviewed: Yes      Staff: CRNA         No complications documented      BP   116/56   Temp  96 6F   Pulse  78   Resp   16   SpO2   94% RA

## 2022-03-15 NOTE — ANESTHESIA PREPROCEDURE EVALUATION
Procedure:  ENDOSCOPIC ULTRASOUND (UPPER)    Relevant Problems   CARDIO   (+) Hypertension   (+) Hypertriglyceridemia   (+) RBBB      GI/HEPATIC   (+) Acute on chronic pancreatitis (HCC)   (+) Chronic pancreatitis (HCC)   (+) GERD (gastroesophageal reflux disease)   (+) Pancreatic lesion   (+) Pancreatitis, unspecified pancreatitis type      NEURO/PSYCH   (+) Chronic pain syndrome      Other   (+) Prediabetes   (+) Renal mass      COVID + (1/2022): no lingering symptoms  No hospitalization required  Physical Exam    Airway    Mallampati score: II  TM Distance: >3 FB  Neck ROM: full     Dental   No notable dental hx     Cardiovascular  Cardiovascular exam normal    Pulmonary  Pulmonary exam normal     Other Findings        Anesthesia Plan  ASA Score- 3     Anesthesia Type- IV sedation with anesthesia with ASA Monitors  Additional Monitors:   Airway Plan:     Comment: NPO after Mn        Plan Factors-Exercise tolerance (METS): >4 METS  Chart reviewed  Patient summary reviewed  Patient is not a current smoker  Induction- intravenous  Postoperative Plan-     Informed Consent- Anesthetic plan and risks discussed with patient  I personally reviewed this patient with the CRNA  Discussed and agreed on the Anesthesia Plan with the CRNA  Niko Dover

## 2022-03-17 NOTE — TELEPHONE ENCOUNTER
Left message for patient that he is not due for a colonoscopy until next year (recall already placed)  Left call back number for any questions or concerns

## 2022-04-01 DIAGNOSIS — G47.00 INSOMNIA, UNSPECIFIED TYPE: ICD-10-CM

## 2022-04-01 RX ORDER — ONDANSETRON 4 MG/1
TABLET, ORALLY DISINTEGRATING ORAL
Qty: 30 TABLET | Refills: 0 | Status: SHIPPED | OUTPATIENT
Start: 2022-04-01 | End: 2022-04-12

## 2022-04-03 DIAGNOSIS — I10 ESSENTIAL HYPERTENSION: ICD-10-CM

## 2022-04-03 RX ORDER — LISINOPRIL 20 MG/1
TABLET ORAL
Qty: 30 TABLET | Refills: 0 | Status: SHIPPED | OUTPATIENT
Start: 2022-04-03 | End: 2022-05-24

## 2022-04-03 RX ORDER — AMLODIPINE BESYLATE 5 MG/1
TABLET ORAL
Qty: 30 TABLET | Refills: 0 | Status: SHIPPED | OUTPATIENT
Start: 2022-04-03 | End: 2022-05-11

## 2022-04-05 DIAGNOSIS — E78.2 MIXED HYPERLIPIDEMIA: ICD-10-CM

## 2022-04-06 RX ORDER — OMEGA-3-ACID ETHYL ESTERS 1 G/1
CAPSULE, LIQUID FILLED ORAL
Qty: 360 CAPSULE | Refills: 0 | Status: SHIPPED | OUTPATIENT
Start: 2022-04-06 | End: 2022-07-06

## 2022-04-08 DIAGNOSIS — G47.00 INSOMNIA, UNSPECIFIED TYPE: ICD-10-CM

## 2022-04-12 RX ORDER — ONDANSETRON 4 MG/1
TABLET, ORALLY DISINTEGRATING ORAL
Qty: 30 TABLET | Refills: 0 | Status: SHIPPED | OUTPATIENT
Start: 2022-04-12 | End: 2022-06-12

## 2022-04-14 DIAGNOSIS — J45.909 UNCOMPLICATED ASTHMA, UNSPECIFIED ASTHMA SEVERITY, UNSPECIFIED WHETHER PERSISTENT: ICD-10-CM

## 2022-04-14 RX ORDER — ALBUTEROL SULFATE 90 UG/1
AEROSOL, METERED RESPIRATORY (INHALATION)
Qty: 54 G | Refills: 0 | Status: SHIPPED | OUTPATIENT
Start: 2022-04-14 | End: 2022-06-06

## 2022-04-15 DIAGNOSIS — E78.2 MIXED HYPERLIPIDEMIA: ICD-10-CM

## 2022-04-15 RX ORDER — ROSUVASTATIN CALCIUM 40 MG/1
TABLET, COATED ORAL
Qty: 90 TABLET | Refills: 0 | Status: SHIPPED | OUTPATIENT
Start: 2022-04-15

## 2022-05-11 DIAGNOSIS — I10 ESSENTIAL HYPERTENSION: ICD-10-CM

## 2022-05-11 RX ORDER — AMLODIPINE BESYLATE 5 MG/1
TABLET ORAL
Qty: 30 TABLET | Refills: 0 | Status: SHIPPED | OUTPATIENT
Start: 2022-05-11 | End: 2022-06-06

## 2022-05-14 DIAGNOSIS — E78.1 HYPERTRIGLYCERIDEMIA: ICD-10-CM

## 2022-05-14 DIAGNOSIS — E78.2 MIXED HYPERLIPIDEMIA: ICD-10-CM

## 2022-05-14 RX ORDER — FENOFIBRIC ACID 135 MG/1
CAPSULE, DELAYED RELEASE ORAL
Qty: 90 CAPSULE | Refills: 0 | Status: SHIPPED | OUTPATIENT
Start: 2022-05-14

## 2022-05-19 ENCOUNTER — OFFICE VISIT (OUTPATIENT)
Dept: FAMILY MEDICINE CLINIC | Facility: CLINIC | Age: 47
End: 2022-05-19
Payer: COMMERCIAL

## 2022-05-19 VITALS
WEIGHT: 206 LBS | SYSTOLIC BLOOD PRESSURE: 124 MMHG | TEMPERATURE: 97 F | HEART RATE: 70 BPM | DIASTOLIC BLOOD PRESSURE: 78 MMHG | HEIGHT: 71 IN | BODY MASS INDEX: 28.84 KG/M2 | OXYGEN SATURATION: 97 %

## 2022-05-19 DIAGNOSIS — I10 ESSENTIAL HYPERTENSION: ICD-10-CM

## 2022-05-19 DIAGNOSIS — I10 PRIMARY HYPERTENSION: ICD-10-CM

## 2022-05-19 DIAGNOSIS — K75.0 LIVER ABSCESS: ICD-10-CM

## 2022-05-19 DIAGNOSIS — R25.1 TREMOR OF BOTH OUTSTRETCHED HANDS: Primary | ICD-10-CM

## 2022-05-19 PROCEDURE — 3008F BODY MASS INDEX DOCD: CPT | Performed by: FAMILY MEDICINE

## 2022-05-19 PROCEDURE — 99214 OFFICE O/P EST MOD 30 MIN: CPT | Performed by: FAMILY MEDICINE

## 2022-05-19 NOTE — PROGRESS NOTES
BMI Counseling: Body mass index is 28 73 kg/m²  The BMI is above normal  Nutrition recommendations include decreasing portion sizes, decreasing fast food intake, limiting drinks that contain sugar, moderation in carbohydrate intake, increasing intake of lean protein, reducing intake of saturated and trans fat and reducing intake of cholesterol  Exercise recommendations include moderate physical activity 150 minutes/week and exercising 3-5 times per week  No pharmacotherapy was ordered  Rationale for BMI follow-up plan is due to patient being overweight or obese  Assessment/Plan:     Chronic Problems:  No problem-specific Assessment & Plan notes found for this encounter  Visit Diagnosis:  Diagnoses and all orders for this visit:    Tremor of both outstretched hands  Comments:  Discussed potentials with patient, encouraged avoidance of caffeine products/alcohol, obtain updated labs eval neurology  Orders:  -     Ambulatory Referral to Neurology; Future  -     CBC and differential; Future  -     Comprehensive metabolic panel; Future  -     TSH, 3rd generation; Future    Essential hypertension  Comments:  Stable within parameters continue present medications dietary modifications was encouraged  Orders:  -     CBC and differential; Future  -     Comprehensive metabolic panel; Future  -     TSH, 3rd generation; Future    Primary hypertension  -     CBC and differential; Future  -     Comprehensive metabolic panel; Future  -     TSH, 3rd generation; Future    Liver abscess  Comments:  Stable, Chronic pancreatitis, continued follow-up with GI          Subjective:    Patient ID: Tri Marvin is a 50 y o  male      F/o tremors to the hands   Has been going on for the past weeks / months   Increasing in frequency   Initially thought to be related to the lyrica there for held , with no benefit   Neg weakness , vision changes neg loss , double , has had some cramping of the hands ?, neg headaches ,  Gait / falls denies ,   Appetite unchanged , weight loss approx 10 , , noted more fatigue over thepast weeks , sleep 3-4 hrs, then otherrs with issue   No known chem exposure   Has worked in automotive industry   meds   [facility] , [facility]  Pancreatitis ,/  Rozetta Dire , endocrine md diamond ,   Will be starting with md tilley In regard to liver , early cirrohosis   Has been abstaining form etoh           The following portions of the patient's history were reviewed and updated as appropriate: allergies, current medications, past family history, past medical history, past social history, past surgical history and problem list     Review of Systems   Constitutional: Negative for appetite change, chills, fever and unexpected weight change  HENT: Negative for congestion, dental problem, ear pain, hearing loss, postnasal drip, rhinorrhea, sinus pressure, sinus pain, sneezing, sore throat, tinnitus and voice change  Eyes: Negative for visual disturbance  Respiratory: Negative for apnea, cough, chest tightness and shortness of breath  Cardiovascular: Negative for chest pain, palpitations and leg swelling  Gastrointestinal: Negative for abdominal pain, blood in stool, constipation, diarrhea, nausea and vomiting  Endocrine: Negative for cold intolerance, heat intolerance, polydipsia, polyphagia and polyuria  Genitourinary: Negative for decreased urine volume, difficulty urinating, dysuria, frequency and hematuria  Musculoskeletal: Negative for arthralgias, back pain, gait problem, joint swelling and myalgias  Skin: Negative for color change, rash and wound  Allergic/Immunologic: Negative for environmental allergies and food allergies  Neurological: Positive for tremors  Negative for dizziness, syncope, weakness, light-headedness, numbness and headaches  Hematological: Negative for adenopathy  Does not bruise/bleed easily  Psychiatric/Behavioral: Negative for sleep disturbance and suicidal ideas   The patient is not nervous/anxious  /78   Pulse 70   Temp (!) 97 °F (36 1 °C)   Ht 5' 11" (1 803 m)   Wt 93 4 kg (206 lb)   SpO2 97%   BMI 28 73 kg/m²   Social History     Socioeconomic History    Marital status: /Civil Union     Spouse name: Not on file    Number of children: Not on file    Years of education: Not on file    Highest education level: Not on file   Occupational History    Occupation: fulltime employment   Tobacco Use    Smoking status: Former Smoker     Packs/day:  00     Years:      Pack years:      Quit date: 3/15/2021     Years since quittin 1    Smokeless tobacco: Never Used   Vaping Use    Vaping Use: Never used   Substance and Sexual Activity    Alcohol use:  Yes     Alcohol/week: 10 0 standard drinks     Types: 10 Cans of beer per week     Comment: on weekends    Drug use: No    Sexual activity: Yes     Partners: Female   Other Topics Concern    Not on file   Social History Narrative    Lives with family    Daily caffeine consumption     Social Determinants of Health     Financial Resource Strain: Not on file   Food Insecurity: Not on file   Transportation Needs: Not on file   Physical Activity: Not on file   Stress: Not on file   Social Connections: Not on file   Intimate Partner Violence: Not on file   Housing Stability: Not on file     Past Medical History:   Diagnosis Date    Asthma     Chronic pain disorder     GERD (gastroesophageal reflux disease)     Hyperlipidemia     Liver abscess     Meniscus tear     left knee work injury last assessed 2016    Pancreatitis     Pneumonia      Family History   Problem Relation Age of Onset    Cirrhosis Mother     Heart disease Other         cardiac disorder    Cancer Other      Past Surgical History:   Procedure Laterality Date    CELIAC PLEXUS BLOCK Bilateral 10/29/2018    Procedure: SPLANCHNIC NERVE BLOCK;  Surgeon: Filipe Barrios MD;  [facility];  Service: Pain Management     CELIAC PLEXUS BLOCK Bilateral 1/10/2019    Procedure: SPLANCHNIC NERVE BLOCK;  Surgeon: Georgie Scales MD;  [facility];  Service: Pain Management     CHOLECYSTECTOMY      ESOPHAGOGASTRODUODENOSCOPY N/A 11/16/2017    Procedure: ESOPHAGOGASTRODUODENOSCOPY (EGD); Surgeon: Mary Lou Renteria MD;  [facility]; Service: Gastroenterology    ESOPHAGOGASTRODUODENOSCOPY N/A 4/19/2018    Procedure: ESOPHAGOGASTRODUODENOSCOPY (EGD); Surgeon: Mare Cuellar MD;  [facility]; Service: Gastroenterology    ESOPHAGOGASTRODUODENOSCOPY N/A 5/10/2018    Procedure: ESOPHAGOGASTRODUODENOSCOPY (EGD); Surgeon: Montserrat Farooq MD;  [facility];   Service: Gastroenterology    IR TEMPORARY DIALYSIS CATHETER PLACEMENT  2/28/2019    KNEE ARTHROSCOPY Bilateral     KNEE ARTHROSCOPY Right 2009    cibishino last assessed 08/24/2016    KNEE ARTHROSCOPY Right 07/01/2019    LIVER SURGERY      NERVE BLOCK Bilateral 5/29/2018    Procedure: SPLANCHNIC NERVE BLOCK;  Surgeon: Georgie Scales MD;  [facility];  Service: Pain Management     PANCREAS SURGERY      stents    PANCREATIC CYST EXCISION      WV INJECT NERV BLCK,CELIAC PLEXUS Bilateral 5/9/2017    Procedure: CELIAC PLEXUS BLOCK ;  Surgeon: Georgie Scales MD;  [facility];  Service: Pain Management     WV INJECT NERV BLCK,CELIAC PLEXUS Bilateral 6/1/2017    Procedure: SPLANCHNIC NERVE BLOCK at T12;  Surgeon: Georgie Scales MD;  [facility];  Service: Pain Management     WV INJECT NERV BLCK,CELIAC PLEXUS Bilateral 8/8/2017    Procedure: BILATERAL SPLANCHNIC NERVE BLOCK T12;  Surgeon: Georgie Scales MD;  [facility];  Service: Pain Management     WV INJECT NERV BLCK,CELIAC PLEXUS Bilateral 4/18/2019    Procedure: BLOCK / INJECTION CELIAC PLEXUS;  Surgeon: Georgie Scales MD;  [facility];  Service: Pain Management     WV INJECT NERV BLCK,CELIAC PLEXUS Bilateral 8/20/2019    Procedure: SPLANCHNIC NERVE BLOCK; Surgeon: Faviola Rubio MD;  [facility];  Service: Pain Management     [place] INJECT NERV BLCK,CELIAC PLEXUS Bilateral 10/17/2019    Procedure: SPLANCHNIC NERVE BLOCK;  Surgeon: Faviola Rubio MD;  [facility];  Service: Pain Management     AZ INJECT NERV Johnye Brittle Bilateral 12/28/2017    Procedure: SPLANCHNIC NERVE BLOCK;  Surgeon: Faviola Rubio MD;  [facility];  Service: Pain Management     [place] LAP,CHOLECYSTECTOMY N/A 2/14/2017    Procedure: LAPAROSCOPIC CHOLECYSTECTOMY, IOC, POSSIBLE OPEN ;  Surgeon: Jeovanny Neumann MD;  [facility];  Service: General    ROTATOR CUFF REPAIR Right     SHOULDER ARTHROSCOPY      SHOULDER ARTHROSCOPY Right        Current Outpatient Medications:     albuterol (PROVENTIL HFA,VENTOLIN HFA) 90 mcg/act inhaler, INHALE TWO PUFFS BY MOUTH EVERY 6 HOURS AS NEEDED FOR WHEEZING, Disp: 54 g, Rfl: 0    amLODIPine (NORVASC) 5 mg tablet, TAKE ONE TABLET BY MOUTH EVERY DAY, Disp: 30 tablet, Rfl: 0    Choline Fenofibrate (Fenofibric Acid) 135 MG CPDR, TAKE ONE CAPSULE BY MOUTH EVERY DAY, Disp: 90 capsule, Rfl: 0    Creon 26981-60490 units capsule, TAKE 1 CAPSULE OF 12,000 UNITS OF LIPASE BY MOUTH UP TO 3 TIMES A DAY AS NEEDED FOR SNACKS, Disp: 90 capsule, Rfl: 0    Creon 44321-246235 units CPEP, TAKE ONE CAPSULE BY MOUTH THREE TIMES DAILY BEFORE MEALS, Disp: 90 capsule, Rfl: 0    esomeprazole (NexIUM) 40 MG capsule, TAKE ONE CAPSULE BY MOUTH TWICE DAILY before meals (Patient taking differently: Take 40 mg by mouth in the morning ), Disp: 60 capsule, Rfl: 5    famotidine (PEPCID) 40 MG tablet, Take 40 mg by mouth daily at bedtime as needed , Disp: , Rfl:     insulin degludec Edu Pena FlexTouch) 100 units/mL injection pen, 20 units subcut in hs, Disp: , Rfl:     Insulin Pen Needle (Pen Needles) 32G X 5 MM MISC, use once daily as directed, Disp: , Rfl:     lisinopril (ZESTRIL) 20 mg tablet, TAKE ONE TABLET BY MOUTH EVERY DAY, Disp: 30 tablet, Rfl: 0    omega-3-acid ethyl esters (LOVAZA) 1 g capsule, TAKE TWO CAPSULES BY MOUTH TWICE DAILY, Disp: 360 capsule, Rfl: 0    ondansetron (ZOFRAN-ODT) 4 mg disintegrating tablet, DISSOLVE ONE TABLET IN MOUTH EVERY EIGHT HOURS AS NEEDED NAUSEA AND VOMITING, Disp: 30 tablet, Rfl: 0    pregabalin (LYRICA) 100 mg capsule, Take 1 capsule (100 mg total) by mouth 2 (two) times a day, Disp: 60 capsule, Rfl: 0    rosuvastatin (CRESTOR) 40 MG tablet, TAKE ONE TABLET BY MOUTH EVERY DAY, Disp: 90 tablet, Rfl: 0    traZODone (DESYREL) 50 mg tablet, Take 1 tablet (50 mg total) by mouth daily at bedtime, Disp: 30 tablet, Rfl: 5    ergocalciferol (ERGOCALCIFEROL) 1 25 MG (26409 UT) capsule, Take 50,000 Units by mouth, Disp: , Rfl:     Allergies   Allergen Reactions    Bee Venom Swelling          Lab Review   Hospital Outpatient Visit on 03/15/2022   Component Date Value    POC Glucose 03/15/2022 117    Telemedicine on 01/13/2022   Component Date Value    SARS-CoV-2 01/13/2022 Positive (A)        Imaging: No results found  Objective:     Physical Exam  Constitutional:       General: He is not in acute distress  Appearance: He is well-developed  He is not ill-appearing or toxic-appearing  HENT:      Head: Normocephalic and atraumatic  Eyes:      General: No scleral icterus  Conjunctiva/sclera: Conjunctivae normal    Neck:      Vascular: No carotid bruit  Cardiovascular:      Rate and Rhythm: Normal rate and regular rhythm  Pulses: Normal pulses  Heart sounds: Normal heart sounds  No murmur heard  Pulmonary:      Effort: Pulmonary effort is normal       Breath sounds: Normal breath sounds  Musculoskeletal:         General: Normal range of motion  Cervical back: Normal range of motion and neck supple  Right lower leg: No edema  Left lower leg: No edema  Lymphadenopathy:      Cervical: No cervical adenopathy  Skin:     General: Skin is warm and dry        Coloration: Skin is not jaundiced  Findings: No rash  Neurological:      Mental Status: He is alert and oriented to person, place, and time  Motor: Tremor ( outstretched hands fine) present  No weakness or pronator drift  Coordination: Coordination is intact  Gait: Abnormal gait: Wide-based  Deep Tendon Reflexes: Reflexes are normal and symmetric  Psychiatric:         Behavior: Behavior normal          Thought Content: Thought content normal          Judgment: Judgment normal            There are no Patient Instructions on file for this visit  LUANA Schilling    Portions of the record may have been created with voice recognition software  Occasional wrong word or "sound a like" substitutions may have occurred due to the inherent limitations of voice recognition software  Read the chart carefully and recognize, using context, where substitutions have occurred

## 2022-05-24 DIAGNOSIS — I10 ESSENTIAL HYPERTENSION: ICD-10-CM

## 2022-05-24 RX ORDER — LISINOPRIL 20 MG/1
TABLET ORAL
Qty: 30 TABLET | Refills: 0 | Status: SHIPPED | OUTPATIENT
Start: 2022-05-24 | End: 2022-06-22

## 2022-06-02 DIAGNOSIS — J45.909 UNCOMPLICATED ASTHMA, UNSPECIFIED ASTHMA SEVERITY, UNSPECIFIED WHETHER PERSISTENT: ICD-10-CM

## 2022-06-05 DIAGNOSIS — K59.00 CONSTIPATION, ACUTE: ICD-10-CM

## 2022-06-05 DIAGNOSIS — K86.1 CHRONIC PANCREATITIS, UNSPECIFIED PANCREATITIS TYPE (HCC): ICD-10-CM

## 2022-06-05 DIAGNOSIS — R14.0 ABDOMINAL DISTENTION: ICD-10-CM

## 2022-06-06 DIAGNOSIS — I10 ESSENTIAL HYPERTENSION: ICD-10-CM

## 2022-06-06 RX ORDER — ALBUTEROL SULFATE 90 UG/1
AEROSOL, METERED RESPIRATORY (INHALATION)
Qty: 54 G | Refills: 0 | Status: SHIPPED | OUTPATIENT
Start: 2022-06-06

## 2022-06-06 RX ORDER — PANCRELIPASE 36000; 180000; 114000 [USP'U]/1; [USP'U]/1; [USP'U]/1
CAPSULE, DELAYED RELEASE PELLETS ORAL
Qty: 90 CAPSULE | Refills: 0 | Status: SHIPPED | OUTPATIENT
Start: 2022-06-06 | End: 2022-07-07

## 2022-06-06 RX ORDER — AMLODIPINE BESYLATE 5 MG/1
TABLET ORAL
Qty: 30 TABLET | Refills: 0 | Status: SHIPPED | OUTPATIENT
Start: 2022-06-06 | End: 2022-08-01

## 2022-06-07 ENCOUNTER — TELEPHONE (OUTPATIENT)
Dept: NEUROLOGY | Facility: CLINIC | Age: 47
End: 2022-06-07

## 2022-06-07 NOTE — TELEPHONE ENCOUNTER
Patient calling to schedule new patient appointment for tremors of hands  No testing done  Triage intake sent

## 2022-06-12 DIAGNOSIS — G47.00 INSOMNIA, UNSPECIFIED TYPE: ICD-10-CM

## 2022-06-12 RX ORDER — ONDANSETRON 4 MG/1
TABLET, ORALLY DISINTEGRATING ORAL
Qty: 30 TABLET | Refills: 0 | Status: SHIPPED | OUTPATIENT
Start: 2022-06-12

## 2022-06-16 LAB
ALBUMIN SERPL-MCNC: 4.7 G/DL (ref 3.6–5.1)
ALBUMIN/GLOB SERPL: 2.4 (CALC) (ref 1–2.5)
ALP SERPL-CCNC: 131 U/L (ref 36–130)
ALT SERPL-CCNC: 58 U/L (ref 9–46)
AST SERPL-CCNC: 52 U/L (ref 10–40)
BASOPHILS # BLD AUTO: 21 CELLS/UL (ref 0–200)
BASOPHILS NFR BLD AUTO: 0.5 %
BILIRUB SERPL-MCNC: 1 MG/DL (ref 0.2–1.2)
BUN SERPL-MCNC: 10 MG/DL (ref 7–25)
BUN/CREAT SERPL: 19 (CALC) (ref 6–22)
CALCIUM SERPL-MCNC: 9.9 MG/DL (ref 8.6–10.3)
CHLORIDE SERPL-SCNC: 104 MMOL/L (ref 98–110)
CO2 SERPL-SCNC: 27 MMOL/L (ref 20–32)
CREAT SERPL-MCNC: 0.54 MG/DL (ref 0.6–1.35)
EOSINOPHIL # BLD AUTO: 50 CELLS/UL (ref 15–500)
EOSINOPHIL NFR BLD AUTO: 1.2 %
ERYTHROCYTE [DISTWIDTH] IN BLOOD BY AUTOMATED COUNT: 13.2 % (ref 11–15)
GLOBULIN SER CALC-MCNC: 2 G/DL (CALC) (ref 1.9–3.7)
GLUCOSE SERPL-MCNC: 121 MG/DL (ref 65–99)
HCT VFR BLD AUTO: 39.1 % (ref 38.5–50)
HGB BLD-MCNC: 13.3 G/DL (ref 13.2–17.1)
LYMPHOCYTES # BLD AUTO: 1428 CELLS/UL (ref 850–3900)
LYMPHOCYTES NFR BLD AUTO: 34 %
MCH RBC QN AUTO: 32.8 PG (ref 27–33)
MCHC RBC AUTO-ENTMCNC: 34 G/DL (ref 32–36)
MCV RBC AUTO: 96.3 FL (ref 80–100)
MONOCYTES # BLD AUTO: 391 CELLS/UL (ref 200–950)
MONOCYTES NFR BLD AUTO: 9.3 %
NEUTROPHILS # BLD AUTO: 2310 CELLS/UL (ref 1500–7800)
NEUTROPHILS NFR BLD AUTO: 55 %
PLATELET # BLD AUTO: 111 THOUSAND/UL (ref 140–400)
PMV BLD REES-ECKER: 11.2 FL (ref 7.5–12.5)
POTASSIUM SERPL-SCNC: 4 MMOL/L (ref 3.5–5.3)
PROT SERPL-MCNC: 6.7 G/DL (ref 6.1–8.1)
RBC # BLD AUTO: 4.06 MILLION/UL (ref 4.2–5.8)
SL AMB EGFR AFRICAN AMERICAN: 143 ML/MIN/1.73M2
SL AMB EGFR NON AFRICAN AMERICAN: 123 ML/MIN/1.73M2
SODIUM SERPL-SCNC: 140 MMOL/L (ref 135–146)
TSH SERPL-ACNC: 1.39 MIU/L (ref 0.4–4.5)
WBC # BLD AUTO: 4.2 THOUSAND/UL (ref 3.8–10.8)

## 2022-06-21 DIAGNOSIS — I10 ESSENTIAL HYPERTENSION: ICD-10-CM

## 2022-06-22 RX ORDER — LISINOPRIL 20 MG/1
TABLET ORAL
Qty: 30 TABLET | Refills: 0 | Status: SHIPPED | OUTPATIENT
Start: 2022-06-22 | End: 2022-07-20

## 2022-06-27 NOTE — TELEPHONE ENCOUNTER
2nd attempt to reach patient for referral   No answer  [facility]  Letter sent  Per Teddy Fitzgerald, called again after 6 days

## 2022-07-03 DIAGNOSIS — K86.1 CHRONIC PANCREATITIS, UNSPECIFIED PANCREATITIS TYPE (HCC): ICD-10-CM

## 2022-07-03 DIAGNOSIS — R14.0 ABDOMINAL DISTENTION: ICD-10-CM

## 2022-07-03 DIAGNOSIS — K59.00 CONSTIPATION, ACUTE: ICD-10-CM

## 2022-07-06 DIAGNOSIS — E78.2 MIXED HYPERLIPIDEMIA: ICD-10-CM

## 2022-07-06 RX ORDER — OMEGA-3-ACID ETHYL ESTERS 1 G/1
CAPSULE, LIQUID FILLED ORAL
Qty: 360 CAPSULE | Refills: 0 | Status: SHIPPED | OUTPATIENT
Start: 2022-07-06

## 2022-07-07 RX ORDER — PANCRELIPASE 36000; 180000; 114000 [USP'U]/1; [USP'U]/1; [USP'U]/1
CAPSULE, DELAYED RELEASE PELLETS ORAL
Qty: 90 CAPSULE | Refills: 0 | Status: SHIPPED | OUTPATIENT
Start: 2022-07-07

## 2022-07-19 DIAGNOSIS — I10 ESSENTIAL HYPERTENSION: ICD-10-CM

## 2022-07-20 DIAGNOSIS — I10 ESSENTIAL HYPERTENSION: ICD-10-CM

## 2022-07-20 RX ORDER — LISINOPRIL 20 MG/1
20 TABLET ORAL DAILY
Qty: 30 TABLET | Refills: 5 | Status: SHIPPED | OUTPATIENT
Start: 2022-07-20 | End: 2023-02-18

## 2022-07-20 RX ORDER — LISINOPRIL 20 MG/1
TABLET ORAL
Qty: 30 TABLET | Refills: 0 | Status: SHIPPED | OUTPATIENT
Start: 2022-07-20 | End: 2022-07-20 | Stop reason: SDUPTHER

## 2022-07-21 ENCOUNTER — TELEMEDICINE (OUTPATIENT)
Dept: NEUROLOGY | Facility: CLINIC | Age: 47
End: 2022-07-21
Payer: COMMERCIAL

## 2022-07-21 VITALS — WEIGHT: 205 LBS | HEIGHT: 71 IN | BODY MASS INDEX: 28.7 KG/M2

## 2022-07-21 DIAGNOSIS — E87.1 HYPONATREMIA: ICD-10-CM

## 2022-07-21 DIAGNOSIS — G25.2 KINETIC TREMOR: Primary | ICD-10-CM

## 2022-07-21 DIAGNOSIS — K75.0 LIVER ABSCESS: ICD-10-CM

## 2022-07-21 DIAGNOSIS — M47.812 CERVICAL SPONDYLOSIS: ICD-10-CM

## 2022-07-21 DIAGNOSIS — R25.1 TREMOR OF BOTH OUTSTRETCHED HANDS: ICD-10-CM

## 2022-07-21 PROCEDURE — 99204 OFFICE O/P NEW MOD 45 MIN: CPT | Performed by: PSYCHIATRY & NEUROLOGY

## 2022-07-21 RX ORDER — PROPRANOLOL HYDROCHLORIDE 10 MG/1
TABLET ORAL
Qty: 120 TABLET | Refills: 2 | Status: SHIPPED | OUTPATIENT
Start: 2022-07-21 | End: 2022-08-16 | Stop reason: SDUPTHER

## 2022-07-21 NOTE — PROGRESS NOTES
[facility] MULTIPLE SCLEROSIS CENTER  PATIENT:  Valerie Juan  MRN:  [id number]  :  1975  DATE OF SERVICE:  2022  Virtual Regular Visit    Verification of patient location:    Patient is located in the following state in which I [place] an [place]  Assessment/Plan:    Problem List Items Addressed This Visit        Digestive    Liver abscess       Musculoskeletal and Integument    Cervical spondylosis    Relevant Medications    propranolol (INDERAL) 10 mg tablet       Other    Hyponatremia    Kinetic tremor - Primary    Relevant Medications    propranolol (INDERAL) 10 mg tablet    Other Relevant Orders    MRI brain w wo contrast    Ammonia    Copper Level    Ceruloplasmin    Vitamin B12    BUN    Creatinine, serum    Tremor of both outstretched hands               Reason for visit is NP   Chief Complaint   Patient presents with    Virtual Regular Visit        Encounter provider Pedro Sanchez MD    Provider located at [address] [address]      Recent Visits  No visits were found meeting these conditions  Showing recent visits within past 7 days and meeting all other requirements  Today's Visits  Date Type Provider Dept   22 Telemedicine Pedro Sanchez MD  [facility]   Showing today's visits and meeting all other requirements  Future Appointments  No visits were found meeting these conditions  Showing future appointments within next 150 days and meeting all other requirements       The patient was identified by name and date of birth  Valerie Juan was informed that this is a telemedicine visit and that the visit is being conducted through [facility] and patient was informed this is a secure, HIPAA-complaint platform  He agrees to proceed     My office door was closed  No one else was in the room    He acknowledged consent and understanding of privacy and security of the video platform  The patient has agreed to participate and understands they can discontinue the visit at any time  Patient is aware this is a billable service  Subjective  Loyd Reddy is a 50 y o  male with progressive bilateral hands tremor   HPI   Mr Fam Natarajan is a 69-year-old male who presented to [facility] for evaluation of bilateral hand tremor  Patient was evaluated by Neurology in October 2021 were EMG study was completed for right upper extremity with normal findings described  Patient has imaging of the cervical spine in March 2021 with multilevel degenerative disc disease with arthropathy and arrival degree foraminal and spinal stenosis has been noted as well  Patient has acute on chronic pancreatitis with chronic pain syndrome  Patient described developing tremors in his hands bilaterally in 2021 with gradual progression has been noted  Patient has no resting tremor, tremor noted with action only  Patient described no tremors or shakiness in his head or lower extremity  Patient has no family history of tremors  Patient described having no changes with his gait  No concern for speech changes or bradykinesia described  Patient stated his tremor is worse in the morning involves both hands right worse than left  Patient has events when tremors subside  Patient has difficulties accommodating work related tasks including signing the paperwork  Patient has not notice changes in the tremor with alcohol use as he is not drinking alcohol, as per the patient  Patient completed serological workup previously with normal TSH, normal ceruloplasmin 2021, B12 level 770 in September 2019, patient HIV negative in 2018  Family history significant for cirrhosis in his mother, as per the chart review  ASSESSMENT AND PLAN    Mr Fam Natarajan has presented to [facility] for evaluation of tremor    Patient clinical presentation consistent with benign essential tremor with progressive worsening has been reported since 2021  Patient stated he has never had tremor prior 2021 with no family history of tremor has been described  Patient is not aware about tremor in his maternal grandmother and his mother as they diet in young age family history of cirrhosis in his mother  We have wide differential diagnosis including Zachary disease, stroke, benign essential tremor, metabolic dysfunction with tremors  Patient will be advised to consider additional serological workup in the light of liver and pancreatic disorders  MRI of the brain was also advised with without contrast, patient described no other focal neurological dysfunction which bring concern for stroke other than progressive worsening tremors  No other signs which bring concern for parkinsonism, in office evaluation will be advised for examination  We also agree patient would benefit initiating propranolol 10 mg twice daily with gradual increase does to 20 mg twice daily as tolerated  Patient blood pressure was within normal range in February 2022, patient taking amlodipine 5 mg for hypertension  Patient is to follow with [facility] advanced practitioner team Mrs [facility] in 2 months             Past Medical History:   Diagnosis Date    Asthma     Chronic pain disorder     GERD (gastroesophageal reflux disease)     Hyperlipidemia     Liver abscess     Meniscus tear     left knee work injury last assessed 08/24/2016    Pancreatitis     Pneumonia        Past Surgical History:   Procedure Laterality Date    CELIAC PLEXUS BLOCK Bilateral 10/29/2018    Procedure: SPLANCHNIC NERVE BLOCK;  Surgeon: Emilie Escobedo MD;  [facility];  Service: Pain Management     CELIAC PLEXUS BLOCK Bilateral 1/10/2019    Procedure: SPLANCHNIC NERVE BLOCK;  Surgeon: Emilie Escobedo MD;  [facility];  Service: Pain Management     CHOLECYSTECTOMY      ESOPHAGOGASTRODUODENOSCOPY N/A 11/16/2017    Procedure: ESOPHAGOGASTRODUODENOSCOPY (EGD); Surgeon: Maira Felipe MD;  [facility]; Service: Gastroenterology    ESOPHAGOGASTRODUODENOSCOPY N/A 4/19/2018    Procedure: ESOPHAGOGASTRODUODENOSCOPY (EGD); Surgeon: Doreen Zavala MD;  [facility]; Service: Gastroenterology    ESOPHAGOGASTRODUODENOSCOPY N/A 5/10/2018    Procedure: ESOPHAGOGASTRODUODENOSCOPY (EGD); Surgeon: Rossy Fair MD;  [facility];   Service: Gastroenterology    IR TEMPORARY DIALYSIS CATHETER PLACEMENT  2/28/2019    KNEE ARTHROSCOPY Bilateral     KNEE ARTHROSCOPY Right 2009    cibishino last assessed 08/24/2016    KNEE ARTHROSCOPY Right 07/01/2019    LIVER SURGERY      NERVE BLOCK Bilateral 5/29/2018    Procedure: SPLANCHNIC NERVE BLOCK;  Surgeon: Isaac Covington MD;  [facility];  Service: Pain Management     PANCREAS SURGERY      stents    PANCREATIC CYST EXCISION      AZ INJECT NERV BLCK,CELIAC PLEXUS Bilateral 5/9/2017    Procedure: CELIAC PLEXUS BLOCK ;  Surgeon: Isaac Covington MD;  [facility];  Service: Pain Management     AZ INJECT NERV BLCK,CELIAC PLEXUS Bilateral 6/1/2017    Procedure: SPLANCHNIC NERVE BLOCK at T12;  Surgeon: Isaac Covington MD;  [facility];  Service: Pain Management     AZ INJECT NERV BLCK,CELIAC PLEXUS Bilateral 8/8/2017    Procedure: BILATERAL SPLANCHNIC NERVE BLOCK T12;  Surgeon: Isaac Covington MD;  [facility];  Service: Pain Management     AZ INJECT NERV BLCK,CELIAC PLEXUS Bilateral 4/18/2019    Procedure: BLOCK / INJECTION CELIAC PLEXUS;  Surgeon: Isaac Covington MD;  [facility];  Service: Pain Management     AZ INJECT NERV BLCK,CELIAC PLEXUS Bilateral 8/20/2019    Procedure: SPLANCHNIC NERVE BLOCK;  Surgeon: Isaac Covington MD;  [facility];  Service: Pain Management     AZ INJECT NERV BLCK,CELIAC PLEXUS Bilateral 10/17/2019    Procedure: SPLANCHNIC NERVE BLOCK; Surgeon: Dahiana Johansen MD;  [facility];  Service: Pain Management     OH INJECT MARKEL Gregory Favor Bilateral 12/28/2017    Procedure: SPLANCHNIC NERVE BLOCK;  Surgeon: Dahiana Johansen MD;  [facility];  Service: Pain Management     OH LAP,CHOLECYSTECTOMY N/A 2/14/2017    Procedure: LAPAROSCOPIC CHOLECYSTECTOMY, IOC, POSSIBLE OPEN ;  Surgeon: Chava Byers MD;  [facility];  Service: General    ROTATOR CUFF REPAIR Right     SHOULDER ARTHROSCOPY      SHOULDER ARTHROSCOPY Right        Current Outpatient Medications   Medication Sig Dispense Refill    albuterol (PROVENTIL HFA,VENTOLIN HFA) 90 mcg/act inhaler INHALE TWO PUFFS BY MOUTH EVERY 6 HOURS AS NEEDED FOR WHEEZING 54 g 0    amLODIPine (NORVASC) 5 mg tablet TAKE ONE TABLET BY MOUTH ONCE A DAY 30 tablet 0    Choline Fenofibrate (Fenofibric Acid) 135 MG CPDR TAKE ONE CAPSULE BY MOUTH EVERY DAY 90 capsule 0    Creon 07111-14083 units capsule TAKE 1 CAPSULE OF 12,000 UNITS OF LIPASE BY MOUTH UP TO 3 TIMES A DAY AS NEEDED FOR SNACKS 90 capsule 0    Creon 39208-915967 units CPEP TAKE ONE CAPSULE BY MOUTH THREE TIMES A DAY BEFORE MEALS 90 capsule 0    Empagliflozin (Jardiance) 10 MG TABS Take 10 mg by mouth every morning      ergocalciferol (ERGOCALCIFEROL) 1 25 MG (14854 UT) capsule Take 50,000 Units by mouth      esomeprazole (NexIUM) 40 MG capsule TAKE ONE CAPSULE BY MOUTH TWICE DAILY before meals (Patient taking differently: Take 40 mg by mouth daily) 60 capsule 5    famotidine (PEPCID) 40 MG tablet Take 40 mg by mouth daily at bedtime as needed       insulin degludec Constanza Lands FlexTouch) 100 units/mL injection pen 20 units subcut in hs      Insulin Pen Needle (Pen Needles) 32G X 5 MM MISC use once daily as directed      lisinopril (ZESTRIL) 20 mg tablet Take 1 tablet (20 mg total) by mouth daily 30 tablet 5    omega-3-acid ethyl esters (LOVAZA) 1 g capsule TAKE TWO CAPSULES BY MOUTH TWICE DAILY 360 capsule 0    ondansetron (ZOFRAN-ODT) 4 mg disintegrating tablet DISSOLVE ONE TABLET IN MOUTH EVERY EIGHT HOURS AS NEEDED NAUSEA AND VOMITING 30 tablet 0    propranolol (INDERAL) 10 mg tablet Take 1 tab twice daily, if tolerated, then increase to 2 tabs in am and 1 tab at night in 3 days, then 2 tabs twice daily in 3 days 120 tablet 2    rosuvastatin (CRESTOR) 40 MG tablet TAKE ONE TABLET BY MOUTH EVERY DAY 90 tablet 0    traZODone (DESYREL) 50 mg tablet Take 1 tablet (50 mg total) by mouth daily at bedtime 30 tablet 5    pregabalin (LYRICA) 100 mg capsule Take 1 capsule (100 mg total) by mouth 2 (two) times a day (Patient not taking: Reported on 7/21/2022) 60 capsule 0     No current facility-administered medications for this visit  Allergies   Allergen Reactions    Bee Venom Swelling       Review of Systems   Constitutional: Negative for chills and fever  HENT: Negative for ear pain and sore throat  Eyes: Negative for pain and visual disturbance  Respiratory: Negative for cough and shortness of breath  Cardiovascular: Negative for chest pain and palpitations  Gastrointestinal: Negative for abdominal pain and vomiting  Genitourinary: Negative for dysuria, frequency and hematuria  Musculoskeletal: Negative for arthralgias and back pain  Skin: Negative for color change and rash  Neurological: Positive for tremors (bilateral hands) and light-headedness (often )  Negative for seizures and syncope  Psychiatric/Behavioral: Positive for sleep disturbance  All other systems reviewed and are negative  Video Exam    Vitals:    07/21/22 0917   Weight: 93 kg (205 lb)   Height: 5' 11" (1 803 m)       Physical Exam   CONSTITUTIONAL: NAD, pleasant  NECK: supple,  PSYCH: appropriate mood and affect  NEUROLOGIC COMPREHENSIVE EXAM: Patient is oriented to person, place and time, NAD; appropriate affect  CN II, III, IV, V, VI, VII,VIII,IX,X,XI-XII intact with EOMI,symmetric face noted   Motor: grossly intact UE/LE bilateral symmetric;  Coordination within normal limits on FTN and MERLY testing; action tremor noted b/l  I spent 30 minutes with patient today in which greater than 50% of the time was spent in counseling/coordination of care regarding Pathophysiology of underlying neurological dysfunction    VIRTUAL VISIT DISCLAIMER      Arlyn Moya verbally agrees to participate in [employer]  Pt is aware that [employer] could be limited without vital signs or the ability to perform a full hands-on physical Angi Light understands he or the provider may request at any time to terminate the video visit and request the patient to seek care or treatment in person

## 2022-08-01 DIAGNOSIS — I10 ESSENTIAL HYPERTENSION: ICD-10-CM

## 2022-08-01 RX ORDER — AMLODIPINE BESYLATE 5 MG/1
TABLET ORAL
Qty: 30 TABLET | Refills: 0 | Status: SHIPPED | OUTPATIENT
Start: 2022-08-01 | End: 2022-08-29

## 2022-08-03 ENCOUNTER — APPOINTMENT (EMERGENCY)
Dept: CT IMAGING | Facility: HOSPITAL | Age: 47
End: 2022-08-03
Payer: COMMERCIAL

## 2022-08-03 ENCOUNTER — HOSPITAL ENCOUNTER (EMERGENCY)
Facility: HOSPITAL | Age: 47
Discharge: HOME | End: 2022-08-03
Attending: EMERGENCY MEDICINE
Payer: COMMERCIAL

## 2022-08-03 VITALS
SYSTOLIC BLOOD PRESSURE: 140 MMHG | HEART RATE: 75 BPM | TEMPERATURE: 98.3 F | OXYGEN SATURATION: 98 % | RESPIRATION RATE: 22 BRPM | DIASTOLIC BLOOD PRESSURE: 70 MMHG
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
## 2022-08-03 DIAGNOSIS — R31.9 HEMATURIA: Primary | ICD-10-CM

## 2022-08-03 DIAGNOSIS — M54.50 LOW BACK PAIN: ICD-10-CM

## 2022-08-03 LAB
ANION GAP SERPL CALCULATED.3IONS-SCNC: 13 MMOL/L (ref 4–13)
BASOPHILS # BLD AUTO: 0.06 THOUSANDS/ΜL (ref 0–0.1)
BASOPHILS NFR BLD AUTO: 1 % (ref 0–1)
BILIRUB UR QL STRIP: NEGATIVE
BUN SERPL-MCNC: 26 MG/DL (ref 5–25)
CALCIUM SERPL-MCNC: 8.6 MG/DL (ref 8.3–10.1)
CHLORIDE SERPL-SCNC: 100 MMOL/L (ref 96–108)
CLARITY UR: CLEAR
CO2 SERPL-SCNC: 21 MMOL/L (ref 21–32)
COLOR UR: YELLOW
CREAT SERPL-MCNC: 0.85 MG/DL (ref 0.6–1.3)
EOSINOPHIL # BLD AUTO: 0.08 THOUSAND/ΜL (ref 0–0.61)
EOSINOPHIL NFR BLD AUTO: 1 % (ref 0–6)
ERYTHROCYTE [DISTWIDTH] IN BLOOD BY AUTOMATED COUNT: 13.2 % (ref 11.6–15.1)
GFR SERPL CREATININE-BSD FRML MDRD: 102 ML/MIN/1.73SQ M
GLUCOSE SERPL-MCNC: 119 MG/DL (ref 65–140)
GLUCOSE UR STRIP-MCNC: ABNORMAL MG/DL
HCT VFR BLD AUTO: 50 % (ref 36.5–49.3)
HGB BLD-MCNC: 16.5 G/DL (ref 12–17)
HGB UR QL STRIP.AUTO: NEGATIVE
IMM GRANULOCYTES # BLD AUTO: 0.02 THOUSAND/UL (ref 0–0.2)
IMM GRANULOCYTES NFR BLD AUTO: 0 % (ref 0–2)
KETONES UR STRIP-MCNC: NEGATIVE MG/DL
LEUKOCYTE ESTERASE UR QL STRIP: NEGATIVE
LYMPHOCYTES # BLD AUTO: 3.16 THOUSANDS/ΜL (ref 0.6–4.47)
LYMPHOCYTES NFR BLD AUTO: 39 % (ref 14–44)
MCH RBC QN AUTO: 32.8 PG (ref 26.8–34.3)
MCHC RBC AUTO-ENTMCNC: 33 G/DL (ref 31.4–37.4)
MCV RBC AUTO: 99 FL (ref 82–98)
MONOCYTES # BLD AUTO: 0.74 THOUSAND/ΜL (ref 0.17–1.22)
MONOCYTES NFR BLD AUTO: 9 % (ref 4–12)
NEUTROPHILS # BLD AUTO: 4.09 THOUSANDS/ΜL (ref 1.85–7.62)
NEUTS SEG NFR BLD AUTO: 50 % (ref 43–75)
NITRITE UR QL STRIP: NEGATIVE
NRBC BLD AUTO-RTO: 0 /100 WBCS
PH UR STRIP.AUTO: 6.5 [PH]
PLATELET # BLD AUTO: 202 THOUSANDS/UL (ref 149–390)
PMV BLD AUTO: 9.8 FL (ref 8.9–12.7)
POTASSIUM SERPL-SCNC: 3.9 MMOL/L (ref 3.5–5.3)
POTASSIUM SERPL-SCNC: 5.6 MMOL/L (ref 3.5–5.3)
PROT UR STRIP-MCNC: NEGATIVE MG/DL
RBC # BLD AUTO: 5.03 MILLION/UL (ref 3.88–5.62)
SODIUM SERPL-SCNC: 134 MMOL/L (ref 135–147)
SP GR UR STRIP.AUTO: 1.01 (ref 1–1.03)
UROBILINOGEN UR QL STRIP.AUTO: 1 E.U./DL
WBC # BLD AUTO: 8.15 THOUSAND/UL (ref 4.31–10.16)

## 2022-08-03 PROCEDURE — 81003 URINALYSIS AUTO W/O SCOPE: CPT | Performed by: PHYSICIAN ASSISTANT

## 2022-08-03 PROCEDURE — 99284 EMERGENCY DEPT VISIT MOD MDM: CPT

## 2022-08-03 PROCEDURE — 74177 CT ABD & PELVIS W/CONTRAST: CPT

## 2022-08-03 PROCEDURE — 36415 COLL VENOUS BLD VENIPUNCTURE: CPT | Performed by: PHYSICIAN ASSISTANT

## 2022-08-03 PROCEDURE — 99284 EMERGENCY DEPT VISIT MOD MDM: CPT | Performed by: PHYSICIAN ASSISTANT

## 2022-08-03 PROCEDURE — 85025 COMPLETE CBC W/AUTO DIFF WBC: CPT | Performed by: PHYSICIAN ASSISTANT

## 2022-08-03 PROCEDURE — 84132 ASSAY OF SERUM POTASSIUM: CPT | Performed by: PHYSICIAN ASSISTANT

## 2022-08-03 PROCEDURE — 96361 HYDRATE IV INFUSION ADD-ON: CPT

## 2022-08-03 PROCEDURE — 96360 HYDRATION IV INFUSION INIT: CPT

## 2022-08-03 PROCEDURE — 80048 BASIC METABOLIC PNL TOTAL CA: CPT | Performed by: PHYSICIAN ASSISTANT

## 2022-08-03 PROCEDURE — G1004 CDSM NDSC: HCPCS

## 2022-08-03 RX ORDER — HYDROCODONE BITARTRATE AND ACETAMINOPHEN 5; 325 MG/1; MG/1
1 TABLET ORAL EVERY 6 HOURS PRN
Qty: 12 TABLET | Refills: 0 | Status: SHIPPED | OUTPATIENT
Start: 2022-08-03 | End: 2022-08-06

## 2022-08-03 RX ORDER — METHOCARBAMOL 750 MG/1
750 TABLET, FILM COATED ORAL 4 TIMES DAILY
Qty: 40 TABLET | Refills: 0 | Status: SHIPPED | OUTPATIENT
Start: 2022-08-03 | End: 2022-08-16

## 2022-08-03 RX ORDER — IBUPROFEN 400 MG/1
400 TABLET ORAL 3 TIMES DAILY PRN
Qty: 15 TABLET | Refills: 0 | Status: SHIPPED | OUTPATIENT
Start: 2022-08-03 | End: 2022-08-16

## 2022-08-03 RX ADMIN — SODIUM CHLORIDE 1000 ML: 0.9 INJECTION, SOLUTION INTRAVENOUS at 07:40

## 2022-08-03 RX ADMIN — IOHEXOL 65 ML: 350 INJECTION, SOLUTION INTRAVENOUS at 08:37

## 2022-08-03 NOTE — ED PROVIDER NOTES
History  Chief Complaint   Patient presents with   [facility] [place] off pool ladder yesterday (approx 5-6 ft), denies head strike, denies thinners, c/o low back pain and hematuria      47-year-old male with past medical history significant for are pancreatitis, gastroesophageal reflux disease, hyperlipidemia presents to emergency department with chief complaint of right-sided abdominal and flank pain and hematuria following a fall approximately 5-6 feet off of a pool ladder onto ground landing on the right side that occurred yesterday     Onset reported as 1 day ago  Location of symptoms reported as the right flank  Quality is reported as dull pain associated with hematuria  Severity reported as mild to moderate  Associated symptoms:  Denies lower extremity paralysis paresthesia or weakness  Denies head injury loss of conscious  Denies neck pain  Denies chest pain  Positive for right flank pain  Positive for hematuria  Modifying factors: Movement exacerbates pain  Tylenol taken last night relieves pain  Context:  Patient denies any use of blood thinners  Patient states he was at home yesterday upon a pool ladder, approximately 5-6 feet on the pool ladder tipped over and he landed on the ground landing on his right side  He did not strike his head  There was no loss of consciousness  He actually did not feel that his fall was that severe however as the day progressed he noticed increasing pain in the right flank  This morning he became concerned when he went to the bathroom and there was bright red blood in the urine prompting to come to the emergency department for further evaluation  Old charts reviewed:  Patient was seen in the emergency department on 09/12/2021 for evaluation of abdominal pain  Social history reviewed  Positive alcohol use - 10 beers per week  Reformed smoker - 20 pk year history  Denies drug use    Past surgical history reviewed remarkable for cholecystectomy, pancreatic stenting/pancreatic cyst excision, rotator cuff repair, knee arthroscopy, shoulder arthroscopy  Patient presents with:  Fall: [place] off pool ladder yesterday (approx 5-6 ft), denies head strike, denies thinners, c/o low back pain and hematuria   Primary Exam  A: Patent  B: Equal breath sounds bilaterally  C: Pulses intact in all extremities, no active bleeding  D: No signs of neurologic impairment  E: exposure completed     No indication for chest xray as fall/pain isolated to low back/right flank  No chest wall tenderness on exam  No chest bruising or contusions, breath sounds present bilaterally  History provided by:  Patient and significant other   used: No    Fall  Associated symptoms: abdominal pain and back pain    Associated symptoms: no chest pain, no headaches, no nausea, no neck pain and no vomiting        Prior to Admission Medications   Prescriptions Last Dose Informant Patient Reported? Taking?    Choline Fenofibrate (Fenofibric Acid) 135 MG CPDR   No No   Sig: TAKE ONE CAPSULE BY MOUTH EVERY DAY   Creon 06848-90920 units capsule   No No   Sig: TAKE 1 CAPSULE OF 12,000 UNITS OF LIPASE BY MOUTH UP TO 3 TIMES A DAY AS NEEDED FOR SNACKS   Creon [id number] units CPEP   No No   Sig: TAKE ONE CAPSULE BY MOUTH THREE TIMES A DAY BEFORE MEALS   Empagliflozin (Jardiance) 10 MG TABS   Yes No   Sig: Take 10 mg by mouth every morning   Insulin Pen Needle (Pen Needles) 32G X 5 MM MISC  Self Yes No   Sig: use once daily as directed   albuterol (PROVENTIL HFA,VENTOLIN HFA) 90 mcg/act inhaler   No No   Sig: INHALE TWO PUFFS BY MOUTH EVERY 6 HOURS AS NEEDED FOR WHEEZING   amLODIPine (NORVASC) 5 mg tablet   No No   Sig: TAKE ONE TABLET BY MOUTH EVERY DAY   ergocalciferol (ERGOCALCIFEROL) 1 25 MG (21172 UT) capsule  Self Yes No   Sig: Take 50,000 Units by mouth   esomeprazole (NexIUM) 40 MG capsule   No No   Sig: TAKE ONE CAPSULE BY MOUTH TWICE DAILY before meals   Patient taking differently: Take 40 mg by mouth daily   famotidine (PEPCID) 40 MG tablet  Self Yes No   Sig: Take 40 mg by mouth daily at bedtime as needed    insulin degludec Mittalsaadia Crespo FlexTouch) 100 units/mL injection pen  Self Yes No   Si units subcut in hs   lisinopril (ZESTRIL) 20 mg tablet   No No   Sig: Take 1 tablet (20 mg total) by mouth daily   omega-3-acid ethyl esters (LOVAZA) 1 g capsule   No No   Sig: TAKE TWO CAPSULES BY MOUTH TWICE DAILY   ondansetron (ZOFRAN-ODT) 4 mg disintegrating tablet   No No   Sig: DISSOLVE ONE TABLET IN MOUTH EVERY EIGHT HOURS AS NEEDED NAUSEA AND VOMITING   pregabalin (LYRICA) 100 mg capsule   No No   Sig: Take 1 capsule (100 mg total) by mouth 2 (two) times a day   Patient not taking: Reported on 2022   propranolol (INDERAL) 10 mg tablet   No No   Sig: Take 1 tab twice daily, if tolerated, then increase to 2 tabs in am and 1 tab at night in 3 days, then 2 tabs twice daily in 3 days   rosuvastatin (CRESTOR) 40 MG tablet   No No   Sig: TAKE ONE TABLET BY MOUTH EVERY DAY   traZODone (DESYREL) 50 mg tablet   No No   Sig: Take 1 tablet (50 mg total) by mouth daily at bedtime      Facility-Administered Medications: None       Past Medical History:   Diagnosis Date    Asthma     Chronic pain disorder     GERD (gastroesophageal reflux disease)     Hyperlipidemia     Liver abscess     Meniscus tear     left knee work injury last assessed 2016    Pancreatitis     Pneumonia        Past Surgical History:   Procedure Laterality Date    CELIAC PLEXUS BLOCK Bilateral 10/29/2018    Procedure: SPLANCHNIC NERVE BLOCK;  Surgeon: Jorge Musa MD;  [facility];  Service: Pain Management     CELIAC PLEXUS BLOCK Bilateral 1/10/2019    Procedure: SPLANCHNIC NERVE BLOCK;  Surgeon: Jorge Musa MD;  [facility];  Service: Pain Management     CHOLECYSTECTOMY      ESOPHAGOGASTRODUODENOSCOPY N/A 2017    Procedure: ESOPHAGOGASTRODUODENOSCOPY (EGD); Surgeon: Delmis Murillo MD;  [facility]; Service: Gastroenterology    ESOPHAGOGASTRODUODENOSCOPY N/A 4/19/2018    Procedure: ESOPHAGOGASTRODUODENOSCOPY (EGD); Surgeon: Iker Sierra MD;  [facility]; Service: Gastroenterology    ESOPHAGOGASTRODUODENOSCOPY N/A 5/10/2018    Procedure: ESOPHAGOGASTRODUODENOSCOPY (EGD); Surgeon: Finn Mir MD;  [facility];   Service: Gastroenterology    IR TEMPORARY DIALYSIS CATHETER PLACEMENT  2/28/2019    KNEE ARTHROSCOPY Bilateral     KNEE ARTHROSCOPY Right 2009    cibishino last assessed 08/24/2016    KNEE ARTHROSCOPY Right 07/01/2019    LIVER SURGERY      NERVE BLOCK Bilateral 5/29/2018    Procedure: SPLANCHNIC NERVE BLOCK;  Surgeon: Emilie Escobedo MD;  [facility];  Service: Pain Management     PANCREAS SURGERY      stents    PANCREATIC CYST EXCISION      NV INJECT NERV BLCK,CELIAC PLEXUS Bilateral 5/9/2017    Procedure: CELIAC PLEXUS BLOCK ;  Surgeon: Emilie Escobedo MD;  [facility];  Service: Pain Management     NV INJECT NERV BLCK,CELIAC PLEXUS Bilateral 6/1/2017    Procedure: SPLANCHNIC NERVE BLOCK at T12;  Surgeon: Emilie Escobedo MD;  [facility];  Service: Pain Management     NV INJECT NERV BLCK,CELIAC PLEXUS Bilateral 8/8/2017    Procedure: BILATERAL SPLANCHNIC NERVE BLOCK T12;  Surgeon: Emilie Escobedo MD;  [facility];  Service: Pain Management     NV INJECT NERV BLCK,CELIAC PLEXUS Bilateral 4/18/2019    Procedure: BLOCK / INJECTION CELIAC PLEXUS;  Surgeon: Emilie Escobedo MD;  [facility];  Service: Pain Management     NV INJECT NERV BLCK,CELIAC PLEXUS Bilateral 8/20/2019    Procedure: SPLANCHNIC NERVE BLOCK;  Surgeon: Emilie Escobedo MD;  [facility];  Service: Pain Management     NV INJECT NERV BLCK,CELIAC PLEXUS Bilateral 10/17/2019    Procedure: SPLANCHNIC NERVE BLOCK;  Surgeon: Emilie Escobedo MD;  [facility];  Service: Pain Management     NV INJECT NERV Jayme Crews Bilateral 2017    Procedure: SPLANCHNIC NERVE BLOCK;  Surgeon: Emilie Escobedo MD;  [facility];  Service: Pain Management     OK LAP,CHOLECYSTECTOMY N/A 2017    Procedure: LAPAROSCOPIC CHOLECYSTECTOMY, IOC, POSSIBLE OPEN ;  Surgeon: Jose Mendez MD;  [facility];  Service: General    ROTATOR CUFF REPAIR Right     SHOULDER ARTHROSCOPY      SHOULDER ARTHROSCOPY Right        Family History   Problem Relation Age of Onset    Cirrhosis Mother     Heart disease Other         cardiac disorder    Cancer Other      I have reviewed and agree with the history as documented  E-Cigarette/Vaping    E-Cigarette Use Never User      E-Cigarette/Vaping Substances    Nicotine No     THC No     CBD No     Flavoring No     Other No     Unknown No      Social History     Tobacco Use    Smoking status: Former Smoker     Packs/day:      Years:      Pack years:      Quit date: 3/15/2021     Years since quittin 3    Smokeless tobacco: Never Used   Vaping Use    Vaping Use: Never used   Substance Use Topics    Alcohol use: Yes     Alcohol/week: 10 0 standard drinks     Types: 10 Cans of beer per week     Comment: on weekends    Drug use: No       Review of Systems   Respiratory: Negative for chest tightness and shortness of breath  Cardiovascular: Negative for chest pain and leg swelling  Gastrointestinal: Positive for abdominal pain  Negative for nausea and vomiting  Genitourinary: Positive for flank pain and hematuria  Negative for decreased urine volume, difficulty urinating, testicular pain and urgency  Musculoskeletal: Positive for back pain  Negative for arthralgias, gait problem, joint swelling, neck pain and neck stiffness  Skin: Negative for rash  Neurological: Negative for tremors, syncope, weakness, light-headedness, numbness and headaches  All other systems reviewed and are negative        Physical Exam  Physical Exam  Vitals and nursing note reviewed  Constitutional:       General: He is not in acute distress  Appearance: Normal appearance  HENT:      Head: Normocephalic and atraumatic  Right Ear: External ear normal       Left Ear: External ear normal       Nose: Nose normal    Eyes:      General: No scleral icterus  Right eye: No discharge  Left eye: No discharge  Conjunctiva/sclera: Conjunctivae normal    Cardiovascular:      Rate and Rhythm: Normal rate and regular rhythm  Pulses: Normal pulses  Pulmonary:      Effort: Pulmonary effort is normal  No respiratory distress  Abdominal:      General: There is no distension  Palpations: There is no mass  Tenderness: There is no abdominal tenderness  There is right CVA tenderness  Hernia: No hernia is present  Musculoskeletal:         General: No tenderness, deformity or signs of injury  Normal range of motion  Cervical back: Normal range of motion and neck supple  No rigidity or tenderness  Comments: There is no midline thoracic or lumbar spinal tenderness to palpation  No bony step offs or deformities on palpation  There is right lumbar paraspinal muscle tenderness to palpation  No saddle anesthesia  Nontender over the costovertebral angle bilaterally  Bilateral lower extremities: The patient is neurovascularly intact in the superficial and deep peroneal, sural, tibial, and saphenous nerve distributions there is normal sensation and good capillary refill within the toes  Strength 5/5 normal to bilateral lower extremities  FROM throughout BLE  No posterior calf pain or palpable cords  Skin:     Capillary Refill: Capillary refill takes less than 2 seconds  Coloration: Skin is not jaundiced or pale  Findings: No bruising, erythema or rash  Neurological:      General: No focal deficit present  Mental Status: He is alert and oriented to person, place, and time   Mental status is at baseline  Cranial Nerves: No cranial nerve deficit  Sensory: No sensory deficit  Motor: No weakness  Psychiatric:         Mood and Affect: Mood normal          Behavior: Behavior normal          Thought Content:  Thought content normal          Judgment: Judgment normal          Vital Signs  ED Triage Vitals   Temperature Pulse Respirations Blood Pressure SpO2   08/03/22 0647 08/03/22 0647 08/03/22 0647 08/03/22 0647 08/03/22 0647   98 3 °F (36 8 °C) 74 20 149/71 98 %      Temp Source Heart Rate Source Patient Position - Orthostatic VS BP Location FiO2 (%)   08/03/22 0647 08/03/22 0859 08/03/22 0647 08/03/22 0647 --   Oral Monitor Sitting Left arm       Pain Score       08/03/22 0800       3           Vitals:    08/03/22 0647 08/03/22 0859   BP: 149/71 140/70   Pulse: 74 75   Patient Position - Orthostatic VS: Sitting Lying         Visual Acuity  Visual Acuity    Flowsheet Row Most Recent Value   L Pupil Size (mm) 3   R Pupil Size (mm) 3          ED Medications  Medications   sodium chloride 0 9 % bolus 1,000 mL (0 mL Intravenous Stopped 8/3/22 1038)   iohexol (OMNIPAQUE) 350 MG/ML injection (MULTI-DOSE) 65 mL (65 mL Intravenous Given 8/3/22 0837)       Diagnostic Studies  Results Reviewed     Procedure Component Value Units Date/Time    Potassium [[id number]]  (Normal) Collected: 08/03/22 0909    Lab Status: Final result Specimen: Blood from Arm, Right Updated: 08/03/22 0927     Potassium 3 9 mmol/L     Basic metabolic panel [[id number]]  (Abnormal) Collected: 08/03/22 0737    Lab Status: Final result Specimen: Blood from Arm, Right Updated: 08/03/22 0800     Sodium 134 mmol/L      Potassium 5 6 mmol/L      Chloride 100 mmol/L      CO2 21 mmol/L      ANION GAP 13 mmol/L      BUN 26 mg/dL      Creatinine 0 85 mg/dL      Glucose 119 mg/dL      Calcium 8 6 mg/dL      eGFR 102 ml/min/1 73sq m     Narrative:      [place] guidelines for Chronic Kidney Disease (CKD):     Stage 1 with normal or high GFR (GFR > 90 mL/min/1 73 square meters)    Stage 2 Mild CKD (GFR = 60-89 mL/min/1 73 square meters)    Stage 3A Moderate CKD (GFR = 45-59 mL/min/1 73 square meters)    Stage 3B Moderate CKD (GFR = 30-44 mL/min/1 73 square meters)    Stage 4 Severe CKD (GFR = 15-29 mL/min/1 73 square meters)    Stage 5 End Stage CKD (GFR <15 mL/min/1 73 square meters)  Note: GFR calculation is accurate only with a steady state creatinine    UA w Reflex to Microscopic w Reflex to Culture [[id number]]  (Abnormal) Collected: 08/03/22 0738    Lab Status: Final result Specimen: Urine, Clean Catch Updated: 08/03/22 0752     Color, UA Yellow     Clarity, UA Clear     Specific Gravity, UA 1 010     pH, UA 6 5     Leukocytes, UA Negative     Nitrite, UA Negative     Protein, UA Negative mg/dl      Glucose,  (1/2%) mg/dl      Ketones, UA Negative mg/dl      Urobilinogen, UA 1 0 E U /dl      Bilirubin, UA Negative     Occult Blood, UA Negative    CBC and differential [[id number]]  (Abnormal) Collected: 08/03/22 0737    Lab Status: Final result Specimen: Blood from Arm, Right Updated: 08/03/22 0744     WBC 8 15 Thousand/uL      RBC 5 03 Million/uL      Hemoglobin 16 5 g/dL      Hematocrit 50 0 %      MCV 99 fL      MCH 32 8 pg      MCHC 33 0 g/dL      RDW 13 2 %      MPV 9 8 fL      Platelets 195 Thousands/uL      nRBC 0 /100 WBCs      Neutrophils Relative 50 %      Immat GRANS % 0 %      Lymphocytes Relative 39 %      Monocytes Relative 9 %      Eosinophils Relative 1 %      Basophils Relative 1 %      Neutrophils Absolute 4 09 Thousands/µL      Immature Grans Absolute 0 02 Thousand/uL      Lymphocytes Absolute 3 16 Thousands/µL      Monocytes Absolute 0 74 Thousand/µL      Eosinophils Absolute 0 08 Thousand/µL      Basophils Absolute 0 06 Thousands/µL                  CT abdomen pelvis with contrast   Final Result by Rebeca Weathers MD (08/03 9673)      No evidence of acute abdominopelvic process  No acute fracture  Chronic findings as above including stable hepatomegaly with features suggestive of cirrhosis and portal hypertension  Workstation performed: [id number]         CT recon only lumbar spine (no charge)   Final Result by Amaya Tapia MD (08/03 3396)      No fracture or traumatic subluxation  Workstation performed: [id number]                    Procedures  Procedures         ED Course  ED Course as of 08/03/22 1559   Wed Aug 03, 2022   0751 CBC and differential(!)  Reviewed  White blood cell count 8 1  Normal   Hemoglobin 16 is normal, hematocrit 50 is elevated  No anemia  0827 UA w Reflex to Microscopic w Reflex to Culture(!)  Reviewed  Elevated glucose  Negative for blood  Negative leukocytes  3636 Basic metabolic panel(!)  Reviewed  Potassium elevated 5 6  Results hemolyzed will repeat  Glucose 119  BUN mildly elevated 26  Creatinine 0 85  No renal failure  9738 CT abdomen pelvis with contrast  Images independently visualized by me  Radiology report reviewed:No evidence of acute abdominopelvic process  No acute fracture  Chronic findings as above including stable hepatomegaly with features suggestive of cirrhosis and portal hypertension  0656 CT recon only lumbar spine (no charge)  Images independently visualized by me  Radiology report reviewed: No fracture or traumatic subluxation  3160 Potassium  Repeat level 3 9  Improved  Initial result likely spurious secondary to hemolyzed sample  SBIRT 22yo+    Flowsheet Row Most Recent Value   SBIRT (23 yo +)    In order to provide better care to our patients, we are screening all of our patients for alcohol and drug use  Would it be okay to ask you these screening questions? Yes Filed at: 08/03/2022 7479   Initial Alcohol Screen: US AUDIT-C     1  How often do you have a drink containing alcohol? 0 Filed at: 08/03/2022 0751   2   How many drinks containing alcohol do you have on a typical day you are drinking? 0 Filed at: 08/03/2022 0755   3a  Male UNDER 65: How often do you have five or more drinks on one occasion? 0 Filed at: 08/03/2022 0755   3b  FEMALE Any Age, or MALE 65+: How often do you have 4 or more drinks on one occassion? 0 Filed at: 08/03/2022 0755   Audit-C Score 0 Filed at: 08/03/2022 3189   EUSEBIO: How many times in the past year have you    Used an illegal drug or used a prescription medication for non-medical reasons? Never Filed at: 08/03/2022 1881                    MDM  Number of Diagnoses or Management Options  Fall, initial encounter: new and requires workup  Hematuria: new and requires workup  Low back pain: new and requires workup  Diagnosis management comments: MDM  ddx includes but is not limited to vertebral compression fracture, renal injury, intra-abdominal injury, coagulopathy, UTI, kidney stone,    ED plan:  Check labs including CBC and chemistry panel, urinalysis to evaluate hematuria, CT of the abdomen and pelvis to evaluate for intra-abdominal injuries with recon of lumbar spine to evaluate for fracture/injury secondary to fall  ED results:   I have reviewed the patient's vital signs, nursing notes, and other relevant ancillary testing/information  I have had a detailed discussion with the patient regarding the historical points, examination findings, and any diagnostic results    Given the large differential diagnosis for this patient, the decision making in this case is of high complexity  ED course:  58-year-old with past medical history significant for liver and pancreas issues presents to the emergency department with complaint of flank pain and hematuria following a fall off a ladder yesterday  Urinalysis demonstrated no blood  It was remarkable for 500 glucose which is likely secondary to patient using Jardiance  No significant anemia on CBC    CT abdomen pelvis with recon of the lumbar spine demonstrated no acute intra-abdominal injury or spinal fracture  Discussed with patient flank pain likely secondary to musculoskeletal back pain  No fracture  Potassium on lab evaluation initially was elevated at 5 6 but his sample was hemolyzed  Repeat was normal at 3 9  Suspect initial result was spurious due to hemolyzed sample  Discussed all test results with patient at bedside  Stable for discharge in outpatient follow-up  Discussed diagnosis right low back pain  Discussed treatment with rest, ice, avoidance of aggravating activities, discussed use of NSAIDs, Percocet for pain and muscle relaxers  Standard narcotic precautions were given  Patient works as a   Work note given  Discussed diagnosis of hematuria  Instructed patient despite no hematuria on urinalysis if he had hematuria this morning he should have follow-up with urology for further evaluation as this could be sign of early bladder or  cancer  I discussed diagnosis and treatment plan with patient at bedside  Extended discussion with patient regarding the diagnosis, pathophysiology, expectant coarse and treatment plan  Instructed to follow up with pcp and recommended specialist in 3-5 days  Reviewed reasons to return to ed  Patient verbalized understanding of diagnosis and agreement with discharge plan of care as well as understanding of reasons to return to ed                   Amount and/or Complexity of Data Reviewed  Clinical lab tests: ordered and reviewed  Tests in the radiology section of CPT®: ordered and reviewed  Discussion of test results with the performing providers: yes  Obtain history from someone other than the patient: yes (Significant other)  Review and summarize past medical records: yes  Independent visualization of images, tracings, or specimens: yes    Risk of Complications, Morbidity, and/or Mortality  General comments: Disclaimers:    I have reasonably determine that electronically prescribing a controlled substance would be impractical for the patient to obtain the controlled substance prescribed by electronic prescription or would cause an untimely delay resulting in an adverse impact on the patient's medical condition        Patient was seen during the outbreak of the corona virus epidemic   Resources are limited due to the severity of patient illnesses associated with virus   Testing is also limited at this time   Discussed with patient at the time of this evaluation   Due to the fact that limited resources are available -treatment options are limited  Patient Progress  Patient progress: stable      Disposition  Final diagnoses:   Hematuria   Low back pain   Fall, initial encounter     Time reflects when diagnosis was documented in both MDM as applicable and the Disposition within this note     Time User Action Codes Description Comment    8/3/2022  7:34 AM Majel Crawford Add [R31 9] Hematuria     8/3/2022  7:34 AM Majel Crawford Add [M54 50] Low back pain     8/3/2022  7:34 AM Majel Crawford Add Ambrose Almanzar, initial encounter       ED Disposition     ED Disposition   Discharge    Condition   Stable    Date/Time   Wed Aug 3, 2022 10:15 AM    Comment   Ajay Polk discharge to home/self care                 Follow-up Information     Follow up With Specialties Details Why Contact Info Additional Information    Demetri Houston, [address], Nurse Practitioner Call in 2 days for further evaluation of symptoms [address] [facility] [address] Emergency Department Emergency Medicine Go to  If symptoms worsen [address] [facility] [address] [facility] Emergency Department, [address] [facility], [facility], [address]    Amena Hebert MD Urology Call in 1 week for further evaluation of blood in urine symptoms [address] [facility]  [address]  [place] [id number]  [phone]             Discharge Medication List as of 8/3/2022 10:19 AM      START taking these medications    Details   HYDROcodone-acetaminophen (NORCO) 5-325 mg per tablet Take 1 tablet by mouth every 6 (six) hours as needed for pain (back pain/initial rx ) for up to 3 days Max Daily Amount: 4 tablets, Starting Wed 8/3/2022, Until Sat 8/6/2022 at 2359, Normal      ibuprofen (MOTRIN) 400 mg tablet Take 1 tablet (400 mg total) by mouth 3 (three) times a day as needed for moderate pain (back pain/initial rx ) for up to 5 days, Starting Wed 8/3/2022, Until Mon 8/8/2022 at 2359, Normal      methocarbamol (ROBAXIN) 750 mg tablet Take 1 tablet (750 mg total) by mouth 4 (four) times a day for 5 days, Starting Wed 8/3/2022, Until Mon 8/8/2022, Normal         CONTINUE these medications which have NOT CHANGED    Details   albuterol (PROVENTIL HFA,VENTOLIN HFA) 90 mcg/act inhaler INHALE TWO PUFFS BY MOUTH EVERY 6 HOURS AS NEEDED FOR WHEEZING, Normal      amLODIPine (NORVASC) 5 mg tablet TAKE ONE TABLET BY MOUTH EVERY DAY, Normal      Choline Fenofibrate (Fenofibric Acid) 135 MG CPDR TAKE ONE CAPSULE BY MOUTH EVERY DAY, Normal      Creon 48855-60559 units capsule TAKE 1 CAPSULE OF 12,000 UNITS OF LIPASE BY MOUTH UP TO 3 TIMES A DAY AS NEEDED FOR SNACKS, Normal      Creon [id number] units CPEP TAKE ONE CAPSULE BY MOUTH THREE TIMES A DAY BEFORE MEALS, Normal      Empagliflozin (Jardiance) 10 MG TABS Take 10 mg by mouth every morning, Historical Med      ergocalciferol (ERGOCALCIFEROL) 1 25 MG (21379 UT) capsule Take 50,000 Units by mouth, Starting Mon 9/20/2021, Until Thu 7/21/2022 at 2359, Historical Med      esomeprazole (NexIUM) 40 MG capsule TAKE ONE CAPSULE BY MOUTH TWICE DAILY before meals, Normal      famotidine (PEPCID) 40 MG tablet Take 40 mg by mouth daily at bedtime as needed , Historical Med      insulin degludec Ben Carter FlexTouch) 100 units/mL injection pen 20 units subcut in hs, Historical Med Insulin Pen Needle (Pen Needles) 32G X 5 MM MISC use once daily as directed, Historical Med      lisinopril (ZESTRIL) 20 mg tablet Take 1 tablet (20 mg total) by mouth daily, Starting Wed 7/20/2022, Normal      omega-3-acid ethyl esters (LOVAZA) 1 g capsule TAKE TWO CAPSULES BY MOUTH TWICE DAILY, Normal      ondansetron (ZOFRAN-ODT) 4 mg disintegrating tablet DISSOLVE ONE TABLET IN MOUTH EVERY EIGHT HOURS AS NEEDED NAUSEA AND VOMITING, Normal      pregabalin (LYRICA) 100 mg capsule Take 1 capsule (100 mg total) by mouth 2 (two) times a day, Starting Mon 2/21/2022, Normal      propranolol (INDERAL) 10 mg tablet Take 1 tab twice daily, if tolerated, then increase to 2 tabs in am and 1 tab at night in 3 days, then 2 tabs twice daily in 3 days, Normal      rosuvastatin (CRESTOR) 40 MG tablet TAKE ONE TABLET BY MOUTH EVERY DAY, Normal      traZODone (DESYREL) 50 mg tablet Take 1 tablet (50 mg total) by mouth daily at bedtime, Starting Wed 10/13/2021, Normal             No discharge procedures on file      PDMP Review       Value Time User    PDMP Reviewed  Yes 1/20/2022  9:27 AM LUANA Jennings          ED Provider  Electronically Signed by           Porsha Cortez PA-C  08/03/22 2790

## 2022-08-03 NOTE — Clinical Note
Washington Grajeda was seen and treated in our emergency department on 8/3/2022  No restrictions            Diagnosis:     Seng Serrato  may return to work on return date  He may return on this date: 08/07/2022         If you have any questions or concerns, please don't hesitate to call        Lia Gallagher PA-C    ______________________________           _______________          _______________  Hospital Representative                              Date                                Time

## 2022-08-12 ENCOUNTER — TELEPHONE (OUTPATIENT)
Dept: NEUROLOGY | Facility: CLINIC | Age: 47
End: 2022-08-12

## 2022-08-12 NOTE — TELEPHONE ENCOUNTER
Spoke to Lilly, confirmed [facility]45[facility]

## 2022-08-16 ENCOUNTER — OFFICE VISIT (OUTPATIENT)
Dept: NEUROLOGY | Facility: CLINIC | Age: 47
End: 2022-08-16
Payer: COMMERCIAL

## 2022-08-16 VITALS
BODY MASS INDEX: 28.7 KG/M2 | HEIGHT: 71 IN | HEART RATE: 66 BPM | SYSTOLIC BLOOD PRESSURE: 138 MMHG | DIASTOLIC BLOOD PRESSURE: 70 MMHG | WEIGHT: 205 LBS | OXYGEN SATURATION: 98 %

## 2022-08-16 DIAGNOSIS — G25.0 TREMOR, ESSENTIAL: Primary | ICD-10-CM

## 2022-08-16 PROCEDURE — 99214 OFFICE O/P EST MOD 30 MIN: CPT

## 2022-08-16 RX ORDER — PROPRANOLOL HYDROCHLORIDE 20 MG/1
TABLET ORAL
Qty: 120 TABLET | Refills: 0 | Status: SHIPPED | OUTPATIENT
Start: 2022-08-16

## 2022-08-16 NOTE — ASSESSMENT & PLAN NOTE
Franco Swain is a 50year old male who returns to the office today to follow up on his tremor  He has noted approximately a 70% improvement in his tremor with Inderal 20 mg twice daily  His history and exam are not concerning for Parkinson's disease at this time and most consistent with a diagnosis of essential tremor  We discussed considering he has had a significant improvement with Inderal, that we can increase this further  We will start with 30 mg twice daily and in 2 weeks if needed he can further increase to 40 mg twice daily  I have asked him to stay well hydrated, and monitor for any signs or symptoms of lightheadedness or dizziness consistent with hypotension  I have also asked him to complete his previously ordered MRI brain as well as lab work to evaluate for any underlying etiology  He will follow-up in 6 months, we will call sooner with any abnormalities on his testing      Plan:    - Increase Propanolol from 20 mg twice a day, to 30 mg twice a day and if needed in 2 weeks can further increase to 40 mg twice a day  - Make sure you stay well hydrated; monitor for lightheadedness or dizziness  - Complete labs and MRI; will call with any abnormalities  - Follow up in 6 months; sooner if needed

## 2022-08-16 NOTE — PATIENT INSTRUCTIONS
- Increase Propanolol from 20 mg twice a day, to 30 mg twice a day and if needed in 2 weeks can further increase to 40 mg twice a day  - Make sure you stay well hydrated; monitor for lightheadedness or dizziness  - Complete labs and MRI; will call with any abnormalities  - Follow up in 6 months; sooner if needed

## 2022-08-16 NOTE — PROGRESS NOTES
Patient ID: Marta Kim is a 50 y o  male  Assessment/Plan:    Tremor, essential  Marta Kim is a 50year old male who returns to the office today to follow up on his tremor  He has noted approximately a 70% improvement in his tremor with Inderal 20 mg twice daily  His history and exam are not concerning for Parkinson's disease at this time and most consistent with a diagnosis of essential tremor  We discussed considering he has had a significant improvement with Inderal, that we can increase this further  We will start with 30 mg twice daily and in 2 weeks if needed he can further increase to 40 mg twice daily  I have asked him to stay well hydrated, and monitor for any signs or symptoms of lightheadedness or dizziness consistent with hypotension  I have also asked him to complete his previously ordered MRI brain as well as lab work to evaluate for any underlying etiology  He will follow-up in 6 months, we will call sooner with any abnormalities on his testing  Plan:    - Increase Propanolol from 20 mg twice a day, to 30 mg twice a day and if needed in 2 weeks can further increase to 40 mg twice a day  - Make sure you stay well hydrated; monitor for lightheadedness or dizziness  - Complete labs and MRI; will call with any abnormalities  - Follow up in 6 months; sooner if needed         Diagnoses and all orders for this visit:    Tremor, essential  -     propranolol (INDERAL) 20 mg tablet; Take 1 5 tabs by mouth twice a day, then increase to 2 tabs by mouth twice a day in 2 weeks if necessary  Stop at the lowest effective dose  I spent 30 minutes in face-to-face time and chart review with this patient today  Subjective:    HPI Marta Kim is a 50year old male who returns to the office today to follow up on his tremor  He was last seen by Dr Natasha Landeros 7/21/22, and has not yet completed his MRI Brain or lab work up due to scheduling and insurance reasons   He is taking Inderal 10 mg 2 tabs twice daily  He has noted about a 70% improvement in his tremor  He is tolerating this well without any adverse effects, lightheadedness or dizziness  BP today is 138/70  He states he can now do his paperwork for work without difficulty  He does not smoke, or drink coffee  He denies any other excess stimulant use  He drinks 1 cup of green tea daily  His tremor continues to be worse in the right hand compared to left, is not at rest and only with intentional movements, and is worse in the morning  He denies any freezing, magnetism of gait, postural instability, swallowing or walking difficulty, or bradykinesia  He denies any neurologic complaints today other than tremor  The following portions of the patient's history were reviewed and updated as appropriate: allergies, current medications, past family history, past medical history, past social history and past surgical history  Objective:    Blood pressure 138/70, pulse 66, height 5' 11" (1 803 m), weight 93 kg (205 lb), SpO2 98 %  Neurological Exam    On neurological examination patient is alert, awake, oriented and in no distress  Speech is fluent without dysarthria or aphasia  There is no facial bradykinesia, and facial expressions are full with no obvious weakness  Cranial nerves 2-12 were symmetrically intact bilaterally  No evidence of any focal weakness or sensory loss in the upper or lower extremities  Motor testing reveals 5/5 strength of the bilateral upper and lower extremities  There was no pronator drift  No fasciculations present  No abnormal involuntary movements at rest  Finger- to-nose reveals mild action tremor bilaterally right>left, but no ataxia and intact proprioceptive function, no dysmetria was noted  Rapid alternating movement normal and smooth  There is no cog wheeling rigidity noted  Sensation was intact to vibration, light touch, and temperature in bilateral upper and lower extremities   Deep tendon reflexes were 2+ and symmetric in the bilateral upper and 1+ and symmetric in bilateral lower extremities  He is able to rise easily without assistance from a seated position  Casual gait is normal including stance, stride, and arm swing  No shuffling gait and he is able to turn in 1-2 steps  Normal tandem gait  Romberg is absent  No postural instability, negative pull test      ROS:    Review of Systems   Constitutional: Negative  Negative for appetite change and fever  HENT: Negative  Negative for hearing loss, tinnitus, trouble swallowing and voice change  Eyes: Negative  Negative for photophobia and pain  Respiratory: Negative  Negative for shortness of breath  Cardiovascular: Negative  Negative for palpitations  Gastrointestinal: Negative  Negative for nausea and vomiting  Endocrine: Negative  Negative for cold intolerance  Genitourinary: Negative  Negative for dysuria, frequency and urgency  Musculoskeletal: Negative  Negative for myalgias and neck pain  Skin: Negative  Negative for rash  Neurological: Positive for tremors  Negative for dizziness, seizures, syncope, facial asymmetry, speech difficulty, weakness, light-headedness, numbness and headaches  Hematological: Negative  Does not bruise/bleed easily  Psychiatric/Behavioral: Negative  Negative for confusion, hallucinations and sleep disturbance  Reviewed ROS as entered by medical assistant

## 2022-08-26 ENCOUNTER — HOSPITAL ENCOUNTER (OUTPATIENT)
Dept: MRI IMAGING | Facility: HOSPITAL | Age: 47
End: 2022-08-26
Attending: INTERNAL MEDICINE
Payer: COMMERCIAL

## 2022-08-26 ENCOUNTER — HOSPITAL ENCOUNTER (OUTPATIENT)
Dept: MRI IMAGING | Facility: HOSPITAL | Age: 47
End: 2022-08-26
Attending: PSYCHIATRY & NEUROLOGY
Payer: COMMERCIAL

## 2022-08-26 DIAGNOSIS — R10.10 UPPER ABDOMINAL PAIN: ICD-10-CM

## 2022-08-26 DIAGNOSIS — K86.1 ACUTE ON CHRONIC PANCREATITIS (HCC): ICD-10-CM

## 2022-08-26 DIAGNOSIS — K85.90 PANCREATITIS, UNSPECIFIED PANCREATITIS TYPE: ICD-10-CM

## 2022-08-26 DIAGNOSIS — G25.2 KINETIC TREMOR: ICD-10-CM

## 2022-08-26 DIAGNOSIS — R74.8 ABNORMAL TRANSAMINASES: ICD-10-CM

## 2022-08-26 DIAGNOSIS — K85.90 ACUTE ON CHRONIC PANCREATITIS (HCC): ICD-10-CM

## 2022-08-26 PROCEDURE — 70553 MRI BRAIN STEM W/O & W/DYE: CPT

## 2022-08-26 PROCEDURE — G1004 CDSM NDSC: HCPCS

## 2022-08-26 PROCEDURE — 74183 MRI ABD W/O CNTR FLWD CNTR: CPT

## 2022-08-26 PROCEDURE — A9585 GADOBUTROL INJECTION: HCPCS | Performed by: PSYCHIATRY & NEUROLOGY

## 2022-08-26 RX ADMIN — GADOBUTROL 9 ML: 604.72 INJECTION INTRAVENOUS at 20:05

## 2022-08-29 DIAGNOSIS — I10 ESSENTIAL HYPERTENSION: ICD-10-CM

## 2022-08-29 RX ORDER — AMLODIPINE BESYLATE 5 MG/1
TABLET ORAL
Qty: 30 TABLET | Refills: 0 | Status: SHIPPED | OUTPATIENT
Start: 2022-08-29 | End: 2022-09-30

## 2022-09-16 DIAGNOSIS — G47.00 INSOMNIA, UNSPECIFIED TYPE: ICD-10-CM

## 2022-09-16 RX ORDER — ONDANSETRON 4 MG/1
TABLET, ORALLY DISINTEGRATING ORAL
Qty: 30 TABLET | Refills: 0 | Status: SHIPPED | OUTPATIENT
Start: 2022-09-16

## 2022-09-27 ENCOUNTER — OFFICE VISIT (OUTPATIENT)
Dept: OBGYN CLINIC | Facility: CLINIC | Age: 47
End: 2022-09-27
Payer: OTHER MISCELLANEOUS

## 2022-09-27 ENCOUNTER — APPOINTMENT (OUTPATIENT)
Dept: RADIOLOGY | Facility: CLINIC | Age: 47
End: 2022-09-27
Payer: COMMERCIAL

## 2022-09-27 VITALS
HEIGHT: 71 IN | DIASTOLIC BLOOD PRESSURE: 85 MMHG | BODY MASS INDEX: 28.56 KG/M2 | HEART RATE: 97 BPM | WEIGHT: 204 LBS | SYSTOLIC BLOOD PRESSURE: 139 MMHG

## 2022-09-27 DIAGNOSIS — S29.9XXA SOFT TISSUE INJURY OF LEFT CHEST WALL: Primary | ICD-10-CM

## 2022-09-27 DIAGNOSIS — S29.9XXA SOFT TISSUE INJURY OF LEFT CHEST WALL: ICD-10-CM

## 2022-09-27 PROCEDURE — 99213 OFFICE O/P EST LOW 20 MIN: CPT | Performed by: FAMILY MEDICINE

## 2022-09-27 PROCEDURE — 71046 X-RAY EXAM CHEST 2 VIEWS: CPT

## 2022-09-27 NOTE — LETTER
September 27, 2022     Patient: Cornelio Rincon  YOB: 1975  Date of Visit: 9/27/2022      To Whom it May Concern:    Cornelio Rincon is under my professional care  Leila Wilcox was seen in my office on 9/27/2022  Leila Wilcox is unable to return to work at this time  He will be re-evaluated after getting MRI done  If you have any questions or concerns, please don't hesitate to call           Sincerely,          [employer], DO        CC: No Recipients

## 2022-09-27 NOTE — PROGRESS NOTES
Assessment/Plan:  Assessment/Plan   Diagnoses and all orders for this visit:    Soft tissue injury of left chest wall  -     MRI chest wall wo contrast; Future  -     Diclofenac Sodium (VOLTAREN) 1 %; Apply 2 g topically 4 (four) times a day  -     XR chest pa & lateral; Future  -     Sling      75-year-old right-hand-dominant male with left chest wall pain following injury at work 09/09/2022  Discussed with patient physical exam, radiographs, impression and plan  X-rays chest are unremarkable for osseous abnormality  Physical exam cervical spine is unremarkable for midline or paraspinal tenderness  He has intact range of motion of cervical spine  There is negative axial load and negative Spurling's  Left shoulder tenderness at the anterior proximal humerus ankle process  There is range of motion limited to forward flexion 90°, abduction 90°, and internal rotation to lumbar spine with reproduction of pain at the left chest wall  He has significant tenderness at the left chest wall/pectorals major and tenderness at the proximal anterior humerus, mild tenderness at the coracoid process  There is weakness with adduction of the arm and 4/5  He has normal sensation, biceps reflex, and radial pulse both upper extremities  Clinical impression is that he is symptomatic from muscle injury to left chest wall  At this time I will refer him for MRI of left chest wall to evaluate for muscle tear as invasive management may be warranted  In the interim he may arm sling when outdoors  He is to apply topical diclofenac gel 3 to 4 times a day  He will follow up with me after getting MRI done  Subjective:   Patient ID: Yosef Diaz is a 50 y o  male  Chief Complaint   Patient presents with    Chest - Pain       75-year-old right-hand-dominant male  presents for evaluation of left chest wall pain onset from injury while at work on 09/09/2022    He was attempting to move a Pallet when he felt a pulling sensation at the left chest wall, he continued to try the lift a Pallet and he felt a tearing sensation  He had pain described as sudden in onset, localized to the left chest wall, radiating along the anterior aspect the upper arm, sharp and burning, worse with movement of the arm, associated with limited range of motion, and improved with resting  The next morning he was unable to push himself to get up out of bed  Reports being evaluated by occupational medicine and was referred to orthopedic care  He has tried taking Tylenol however has difficulty tolerating oral medications  He has been resting for the past 2 5 weeks  The following portions of the patient's history were reviewed and updated as appropriate: He  has a past medical history of Asthma, Chronic pain disorder, GERD (gastroesophageal reflux disease), Hyperlipidemia, Liver abscess, Meniscus tear, Pancreatitis, and Pneumonia  He  has a past surgical history that includes Rotator cuff repair (Right); Pancreas surgery; Cholecystectomy; pr lap,cholecystectomy (N/A, 2/14/2017); Liver surgery; pr inject nerv blck,celiac plexus (Bilateral, 5/9/2017); pr inject nerv blck,celiac plexus (Bilateral, 6/1/2017); pr inject nerv blck,celiac plexus (Bilateral, 8/8/2017); Esophagogastroduodenoscopy (N/A, 11/16/2017); pr inject nerv blck,paravert sympath (Bilateral, 12/28/2017); Knee arthroscopy (Bilateral); Shoulder arthroscopy; Shoulder arthroscopy (Right); Esophagogastroduodenoscopy (N/A, 4/19/2018); Esophagogastroduodenoscopy (N/A, 5/10/2018); Knee arthroscopy (Right, 2009); NERVE BLOCK (Bilateral, 5/29/2018); Celiac plexus block (Bilateral, 10/29/2018); Celiac plexus block (Bilateral, 1/10/2019); IR temporary dialysis catheter placement (2/28/2019); pr inject nerv blck,celiac plexus (Bilateral, 4/18/2019);  Pancreatic cyst excision; Knee arthroscopy (Right, 07/01/2019); pr inject nerv blck,celiac plexus (Bilateral, 8/20/2019); and pr inject estiven mena,celiac plexus (Bilateral, 10/17/2019)  His family history includes Cancer in his other; Cirrhosis in his mother; Heart disease in his other  He  reports that he quit smoking about 18 months ago  He has a 20 00 pack-year smoking history  He has never used smokeless tobacco  He reports current alcohol use of about 10 0 standard drinks of alcohol per week  He reports that he does not use drugs  He is allergic to bee venom       Review of Systems   Constitutional: Negative for chills and fever  HENT: Negative for sore throat  Eyes: Negative for visual disturbance  Respiratory: Negative for shortness of breath  Cardiovascular: Negative for palpitations  Gastrointestinal: Negative for abdominal pain  Genitourinary: Negative for difficulty urinating  Musculoskeletal: Positive for arthralgias  Negative for joint swelling  Skin: Negative for rash and wound  Neurological: Positive for weakness  Negative for numbness  Hematological: Does not bruise/bleed easily  Psychiatric/Behavioral: Negative for self-injury  Objective:  Vitals:    09/27/22 1323   BP: 139/85   Pulse: 97   Weight: 92 5 kg (204 lb)   Height: 5' 11" (1 803 m)     Back Exam     Comments:    Cervical spine  -no tenderness  -normal range of motion  -negative axial load  -negative Spurling's  -normal sensation and biceps reflex both upper extremities      Right Hand Exam     Muscle Strength   The patient has normal right wrist strength  Other   Sensation: normal  Pulse: present      Left Hand Exam     Muscle Strength   The patient has normal left wrist strength  Other   Sensation: normal  Pulse: present      Right Elbow Exam     Other   Sensation: normal      Left Elbow Exam     Tenderness   The patient is experiencing no tenderness  Range of Motion   The patient has normal left elbow ROM  Muscle Strength   The patient has normal left elbow strength (5/5 flexion and extension)      Other   Sensation: normal      Right Shoulder Exam     Other   Sensation: normal      Left Shoulder Exam     Tenderness   Left shoulder tenderness location: Proximal humerus, coracoid  Range of Motion   Active abduction: 90   Forward flexion: 90   Internal rotation 0 degrees: Lumbar     Muscle Strength   Left shoulder normal muscle strength: 4+/5 internal rotation, 4/5 adduction  Abduction: 5/5   External rotation: 5/5   Supraspinatus: 5/5     Tests   Vitale test: positive    Other   Sensation: normal     Comments:  Negative empty can test  Negative belly press          Strength/Myotome Testing     Left Wrist/Hand   Normal wrist strength    Right Wrist/Hand   Normal wrist strength      Physical Exam  Vitals and nursing note reviewed  Constitutional:       General: He is not in acute distress  Appearance: He is well-developed  He is not ill-appearing or diaphoretic  HENT:      Head: Normocephalic and atraumatic  Right Ear: External ear normal       Left Ear: External ear normal    Eyes:      Conjunctiva/sclera: Conjunctivae normal    Neck:      Trachea: No tracheal deviation  Cardiovascular:      Rate and Rhythm: Normal rate  Pulmonary:      Effort: Pulmonary effort is normal  No respiratory distress  Abdominal:      General: There is no distension  Musculoskeletal:         General: Tenderness (Left chest wall) present  No swelling, deformity or signs of injury  Skin:     General: Skin is warm and dry  Coloration: Skin is not jaundiced or pale  Neurological:      Mental Status: He is alert and oriented to person, place, and time  Psychiatric:         Mood and Affect: Mood normal          Behavior: Behavior normal          Thought Content:  Thought content normal          Judgment: Judgment normal          I have personally reviewed pertinent films in PACS and my interpretation is No osseous abnormality of the chest

## 2022-09-28 ENCOUNTER — TELEPHONE (OUTPATIENT)
Dept: OBGYN CLINIC | Facility: HOSPITAL | Age: 47
End: 2022-09-28

## 2022-09-28 NOTE — TELEPHONE ENCOUNTER
Dr Rosalio Avendano    Electronically faxed 9/27 OVN and work status to [employer] from [place]      Fax # [phone]

## 2022-09-30 DIAGNOSIS — I10 ESSENTIAL HYPERTENSION: ICD-10-CM

## 2022-09-30 RX ORDER — AMLODIPINE BESYLATE 5 MG/1
TABLET ORAL
Qty: 30 TABLET | Refills: 0 | Status: SHIPPED | OUTPATIENT
Start: 2022-09-30 | End: 2022-10-27

## 2022-10-27 DIAGNOSIS — I10 ESSENTIAL HYPERTENSION: ICD-10-CM

## 2022-10-27 RX ORDER — AMLODIPINE BESYLATE 5 MG/1
TABLET ORAL
Qty: 30 TABLET | Refills: 0 | Status: SHIPPED | OUTPATIENT
Start: 2022-10-27

## 2022-10-31 ENCOUNTER — HOSPITAL ENCOUNTER (OUTPATIENT)
Dept: MRI IMAGING | Facility: CLINIC | Age: 47
Discharge: HOME | End: 2022-10-31

## 2022-10-31 DIAGNOSIS — S29.9XXA SOFT TISSUE INJURY OF LEFT CHEST WALL: ICD-10-CM

## 2022-11-02 ENCOUNTER — VBI (OUTPATIENT)
Dept: ADMINISTRATIVE | Facility: OTHER | Age: 47
End: 2022-11-02

## 2022-11-04 ENCOUNTER — OFFICE VISIT (OUTPATIENT)
Dept: OBGYN CLINIC | Facility: CLINIC | Age: 47
End: 2022-11-04

## 2022-11-04 VITALS
HEART RATE: 98 BPM | DIASTOLIC BLOOD PRESSURE: 83 MMHG | HEIGHT: 71 IN | SYSTOLIC BLOOD PRESSURE: 132 MMHG | BODY MASS INDEX: 28.7 KG/M2 | WEIGHT: 205 LBS

## 2022-11-04 DIAGNOSIS — S46.212D BICEPS STRAIN, LEFT, SUBSEQUENT ENCOUNTER: ICD-10-CM

## 2022-11-04 DIAGNOSIS — S29.011D STRAIN OF LEFT PECTORALIS MUSCLE, SUBSEQUENT ENCOUNTER: Primary | ICD-10-CM

## 2022-11-04 DIAGNOSIS — S46.912D STRAIN OF LEFT SHOULDER, SUBSEQUENT ENCOUNTER: ICD-10-CM

## 2022-11-04 NOTE — PROGRESS NOTES
Assessment/Plan:  Assessment/Plan   Diagnoses and all orders for this visit:    Strain of left pectoralis muscle, subsequent encounter  -     Ambulatory Referral to Physical Therapy; Future    Strain of left shoulder, subsequent encounter  -     Ambulatory Referral to Physical Therapy; Future    Biceps strain, left, subsequent encounter  -     Ambulatory Referral to Physical Therapy; Future        57-year-old right-hand-dominant male with onset of left chest wall pain from injury at work on 09/09/2022  Discussed with patient MRI results, impression and plan  MRI of chest wall noted for intact pectoralis muscles without appreciation of muscle or tendon injury  Left shoulder noted for range of motion limited to forward flexion 100°, abduction 90°, and internal rotation to the lumbar spine  He has 4+/5 strength abduction and internal rotation  Clinical impression is that he is symptomatic from chest wall and left shoulder strain  I discussed regimen of formal therapy  He may discontinue arm sling  He is to start physical therapy as soon as possible and do home exercises as directed  He will follow up in 6 weeks at which point he will be re-evaluated  Subjective:   Patient ID: Elza Flores is a 50 y o  male  Chief Complaint   Patient presents with   • Chest - Follow-up       57-year-old right-hand-dominant male following up for onset left chest wall pain from injury at work on 09/09/2022  He was last seen by me more than 5 weeks ago which point he was referred for MRI of the chest   He has been trying to refrain from strenuous activity  He has pain described as localized to left chest wall and radiating to the anterior aspect the shoulder, worse with moving the arm, associated limited range of motion, and improved with resting  He has been wearing arm sling outdoors, but indoors has been moving the arm as tolerated                Review of Systems    Objective:  Vitals:    11/04/22 0811   BP: 132/83 Pulse: 98   Weight: 93 kg (205 lb)   Height: 5' 11" (1 803 m)     Left Shoulder Exam     Tenderness   The patient is experiencing tenderness in the biceps tendon (Anterior, pectoralis)  Range of Motion   Active abduction: 90   Forward flexion: 100   Internal rotation 0 degrees: Lumbar     Muscle Strength   Left shoulder normal muscle strength: 4+/5 abduction, internal rotation, and supraspinatus  External rotation: 5/5             Physical Exam  Vitals and nursing note reviewed  Constitutional:       General: He is not in acute distress  Appearance: He is well-developed  He is not ill-appearing or diaphoretic  HENT:      Head: Normocephalic and atraumatic  Right Ear: External ear normal       Left Ear: External ear normal    Eyes:      Conjunctiva/sclera: Conjunctivae normal    Neck:      Trachea: No tracheal deviation  Cardiovascular:      Rate and Rhythm: Normal rate  Pulmonary:      Effort: Pulmonary effort is normal  No respiratory distress  Abdominal:      General: There is no distension  Musculoskeletal:         General: Tenderness present  No swelling, deformity or signs of injury  Skin:     General: Skin is warm and dry  Coloration: Skin is not jaundiced or pale  Neurological:      Mental Status: He is alert and oriented to person, place, and time  Psychiatric:         Mood and Affect: Mood normal          Behavior: Behavior normal          Thought Content: Thought content normal          Judgment: Judgment normal          I have personally reviewed pertinent films in PACS and my interpretation is No appreciable chest wall muscle disruption

## 2022-11-04 NOTE — LETTER
November 4, 2022     Patient: Ratna Whelan  YOB: 1975  Date of Visit: 11/4/2022      To Whom it May Concern:    Ratna Whelan is under my professional care  Gibran Machado was seen in my office on 11/4/2022  Gibran Machado is unable to return to work at this time  He will be re-evaluated in 6 weeks  If you have any questions or concerns, please don't hesitate to call           Sincerely,          [employer], DO        CC: No Recipients

## 2022-11-07 DIAGNOSIS — E78.2 MIXED HYPERLIPIDEMIA: ICD-10-CM

## 2022-11-07 DIAGNOSIS — K59.00 CONSTIPATION, ACUTE: ICD-10-CM

## 2022-11-07 DIAGNOSIS — R14.0 ABDOMINAL DISTENTION: ICD-10-CM

## 2022-11-07 DIAGNOSIS — K86.1 CHRONIC PANCREATITIS, UNSPECIFIED PANCREATITIS TYPE (HCC): ICD-10-CM

## 2022-11-07 DIAGNOSIS — E78.1 HYPERTRIGLYCERIDEMIA: ICD-10-CM

## 2022-11-07 DIAGNOSIS — G47.00 INSOMNIA, UNSPECIFIED TYPE: ICD-10-CM

## 2022-11-08 DIAGNOSIS — K86.1 CHRONIC PANCREATITIS, UNSPECIFIED PANCREATITIS TYPE (HCC): ICD-10-CM

## 2022-11-08 RX ORDER — PANCRELIPASE 60000; 12000; 38000 [USP'U]/1; [USP'U]/1; [USP'U]/1
CAPSULE, DELAYED RELEASE PELLETS ORAL
Qty: 90 CAPSULE | Refills: 0 | Status: SHIPPED | OUTPATIENT
Start: 2022-11-08

## 2022-11-09 RX ORDER — PANCRELIPASE 36000; 180000; 114000 [USP'U]/1; [USP'U]/1; [USP'U]/1
CAPSULE, DELAYED RELEASE PELLETS ORAL
Qty: 90 CAPSULE | Refills: 0 | Status: SHIPPED | OUTPATIENT
Start: 2022-11-09 | End: 2022-11-10 | Stop reason: SDUPTHER

## 2022-11-09 RX ORDER — TRAZODONE HYDROCHLORIDE 50 MG/1
TABLET ORAL
Qty: 30 TABLET | Refills: 0 | Status: SHIPPED | OUTPATIENT
Start: 2022-11-09 | End: 2022-11-10 | Stop reason: SDUPTHER

## 2022-11-09 RX ORDER — OMEGA-3-ACID ETHYL ESTERS 1 G/1
CAPSULE, LIQUID FILLED ORAL
Qty: 360 CAPSULE | Refills: 0 | Status: SHIPPED | OUTPATIENT
Start: 2022-11-09 | End: 2022-11-10 | Stop reason: SDUPTHER

## 2022-11-09 RX ORDER — ONDANSETRON 4 MG/1
TABLET, ORALLY DISINTEGRATING ORAL
Qty: 30 TABLET | Refills: 0 | Status: SHIPPED | OUTPATIENT
Start: 2022-11-09 | End: 2022-11-10 | Stop reason: SDUPTHER

## 2022-11-10 DIAGNOSIS — E78.2 MIXED HYPERLIPIDEMIA: ICD-10-CM

## 2022-11-10 DIAGNOSIS — R14.0 ABDOMINAL DISTENTION: ICD-10-CM

## 2022-11-10 DIAGNOSIS — K86.1 CHRONIC PANCREATITIS, UNSPECIFIED PANCREATITIS TYPE (HCC): ICD-10-CM

## 2022-11-10 DIAGNOSIS — E78.1 HYPERTRIGLYCERIDEMIA: ICD-10-CM

## 2022-11-10 DIAGNOSIS — K59.00 CONSTIPATION, ACUTE: ICD-10-CM

## 2022-11-10 DIAGNOSIS — G47.00 INSOMNIA, UNSPECIFIED TYPE: ICD-10-CM

## 2022-11-10 RX ORDER — TRAZODONE HYDROCHLORIDE 50 MG/1
50 TABLET ORAL
Qty: 30 TABLET | Refills: 0 | Status: SHIPPED | OUTPATIENT
Start: 2022-11-10

## 2022-11-10 RX ORDER — PANCRELIPASE 36000; 180000; 114000 [USP'U]/1; [USP'U]/1; [USP'U]/1
1 CAPSULE, DELAYED RELEASE PELLETS ORAL
Qty: 90 CAPSULE | Refills: 0 | Status: SHIPPED | OUTPATIENT
Start: 2022-11-10

## 2022-11-10 RX ORDER — FENOFIBRIC ACID 135 MG/1
1 CAPSULE, DELAYED RELEASE ORAL DAILY
Qty: 90 CAPSULE | Refills: 0 | Status: SHIPPED | OUTPATIENT
Start: 2022-11-10

## 2022-11-10 RX ORDER — FENOFIBRIC ACID 135 MG/1
CAPSULE, DELAYED RELEASE ORAL
Qty: 90 CAPSULE | Refills: 0 | Status: SHIPPED | OUTPATIENT
Start: 2022-11-10

## 2022-11-10 RX ORDER — ONDANSETRON 4 MG/1
4 TABLET, ORALLY DISINTEGRATING ORAL EVERY 8 HOURS SCHEDULED
Qty: 30 TABLET | Refills: 0 | Status: SHIPPED | OUTPATIENT
Start: 2022-11-10

## 2022-11-10 RX ORDER — OMEGA-3-ACID ETHYL ESTERS 1 G/1
2 CAPSULE, LIQUID FILLED ORAL 2 TIMES DAILY
Qty: 360 CAPSULE | Refills: 0 | Status: SHIPPED | OUTPATIENT
Start: 2022-11-10

## 2022-11-15 ENCOUNTER — TELEPHONE (OUTPATIENT)
Dept: OBGYN CLINIC | Facility: CLINIC | Age: 47
End: 2022-11-15

## 2022-11-15 NOTE — TELEPHONE ENCOUNTER
Caller: Mart Pride w/ [facility] [employer]     Doctor/Office: Dr Cortes     #: [phone]      What needs to be faxed: Susana Ramirez from 11/4/2022     ATTN to: Mart Pride     Fax#: [phone]      Documents were successfully e-faxed

## 2022-11-22 ENCOUNTER — EVALUATION (OUTPATIENT)
Dept: PHYSICAL THERAPY | Facility: CLINIC | Age: 47
End: 2022-11-22

## 2022-11-22 DIAGNOSIS — S46.212D BICEPS STRAIN, LEFT, SUBSEQUENT ENCOUNTER: ICD-10-CM

## 2022-11-22 DIAGNOSIS — S46.912D STRAIN OF LEFT SHOULDER, SUBSEQUENT ENCOUNTER: ICD-10-CM

## 2022-11-22 DIAGNOSIS — S29.011D STRAIN OF LEFT PECTORALIS MUSCLE, SUBSEQUENT ENCOUNTER: Primary | ICD-10-CM

## 2022-11-22 NOTE — PROGRESS NOTES
PT Evaluation     Today's date: 2022  Patient name: Gurinder Bernal  : 1975  MRN: [id number]  Referring provider: Ketty Alas DO  Dx:   Encounter Diagnosis     ICD-10-CM    1  Strain of left shoulder, subsequent encounter  S46 912D Ambulatory Referral to Physical Therapy      2  Biceps strain, left, subsequent encounter  S46 212D Ambulatory Referral to Physical Therapy      3  Strain of left pectoralis muscle, subsequent encounter  S29 011D Ambulatory Referral to Physical Therapy                     Assessment  Assessment details: Pt is a 50 y o  male who presents to OP PT for IE following referral for L shoulder strain, L pec strain, and L biceps strain  Pt c/o pain in L pec region following injury at work in September  Upon formal assessment pt demonstrates, ROM deficits both actively and passively w/ flexion and abduction demonstrating greatest deficits  Strength deficits also noted w/ L sided testing, grossly 3+/5 L shoulder musculature and 4-/5 for L triceps and biceps  Extremely TTP over L pec major muscle  Normal scapular rhythm observed, does present w/ forward head and rounded shoulder posture  Given these findings pt is recommended for skilled PT intervention 2x wk to improve L shoulder strength and ROM and allow for safety RTW  Pt agreeable to POC  Initial HEP given and completed in clinic, handout provided  Understanding of Dx/Px/POC: good   Prognosis: good    Goals  STG (to be achieved w/i 2-4 weeks)  1  Pt will be I w/ initial HEP and report daily compliance  2  Pt will improve LUE AROM to WNL to promote ease w/ functional mobility  3  Pt will improve LUE strength to WNL to promote ease w/ functional mobility  LTGs (to be achieved within 6-8 weeks)   1  Pt will be I with HEP at d/c to promote PT carry-over  2  Pt will meet FOTO predicted score  3  Pt will report no >2/10 pain w/ all activities to indicate a significant improvement in activity tolerance and pain    4  Pt will be able to lift 20# OH and push-pull 100# sled without large increases in pain  5  Pt will be able to RTW full duty  Plan  Patient would benefit from: skilled physical therapy  Planned modality interventions: unattended electrical stimulation, ultrasound, traction, thermotherapy: hydrocollator packs and cryotherapy  Planned therapy interventions: abdominal trunk stabilization, joint mobilization, manual therapy, massage, Schumacher taping, neuromuscular re-education, strengthening, stretching, therapeutic activities, patient education, aquatic therapy, body mechanics training, balance/weight bearing training, therapeutic exercise, therapeutic training, work reintegration, home exercise program, graded motor, graded exercise, graded activity, gait training, functional ROM exercises and flexibility  Frequency: 2x week  Duration in weeks: 8  Treatment plan discussed with: patient        Subjective Evaluation    History of Present Illness  Mechanism of injury: Pt c/o L shoulder and chest wall pain from injury that occurred at work on 9/9/22 when pt was trying to move something heavy and heard audible pop  MRI has been completed with no remarkable findings  Physician recommended formal PT  Pt was wearing sling but has not been wearing recently per MD recommendations  Pt is currently not working per MD orders; job title is   In order to return to work full duty pt must be able to climb in a truck, move pallets with pallet liseth (200-2000#), carrying boxes (1#-80#), repeatedly reaching/lifting overhead  Next physician f/u 12/16  Pain location L chest and anterior aspect of shoulder  Pt rates pain as 10/10 W and 3/10 C/B  Some tingling into L pinky on palm side    Aggravating factors include: lifting grocery bags, donning shirt/jacket  Alleviating factors include: rest, ice            Objective     Postural Observations  Seated posture: fair  Standing posture: fair    Additional Postural Observation Details  Forward head & rounded shoulders    Palpation   Left   Tenderness of the pectoralis major  Left Shoulder Comments  Left pectoralis major: extreme tenderness  Tenderness     Left Shoulder   Tenderness in the coracoid process  Cervical/Thoracic Screen   Cervical range of motion within normal limits  Thoracic range of motion within normal limits    Active Range of Motion   Left Shoulder   Flexion: 98 (pain above 90) degrees   Abduction: 100 (pain above 90) degrees   External rotation 0°: WFL and with pain  Internal rotation BTB: L4 with pain    Right Shoulder   Normal active range of motion    Passive Range of Motion   Left Shoulder   Flexion: 105 degrees with pain  Abduction: 111 degrees with pain  External rotation 45°: 15 degrees with pain  Internal rotation 45°: WFL and with pain    Right Shoulder   Normal passive range of motion    Scapular Mobility   Left Shoulder   Scapular Dyskinesis: none  Scapular mobility: [facility]    Right Shoulder   Scapular Dyskinesis: none  [facility]apular [facility]    Joint Play   Left Shoulder  Joints within functional limits are the inferior capsule and cervical spine  Hypomobile in the posterior capsule and thoracic spine  Strength/Myotome Testing     Left Shoulder     Planes of Motion   Flexion: 3+   Abduction: 3+   External rotation at 0°: 3+   Internal rotation at 0°: 3+     Isolated Muscles   Biceps: 4-   Pectoralis major: 3   Triceps: 4     Right Shoulder   Normal muscle strength    Tests     Left Shoulder   Positive ULTT3                Precautions: asthma, GERD   * indicates exercises included in HEP    Manuals 11/22            L shoulder PROM 5' VR            Post & inf GHJ mobs (grade II-III)                          Pt education HEP, POC            Neuro Re-Ed             Blade squeezes * 5"x20            TB rows (M/L)             Bent over rows             Wall angels             Touchdown liftoffs              's KB carry Ther Ex             UBE             Pullies             Shoulder rolls (back only) * x20            Table slides (flex/abd) * 5"x20            Bicep curls * 5# x20            SA punch             Ball rolls on wall (CW/CCW)             Finger ladder             Pec stretch NV *           Cane/wand (flex/abd)             Thoracic foam roll progression NV *           Ulnar nerve glides  NV *                        Ther Activity             RTW activities              Lift box floor to table             Lift box table to [facility]             Push/pull sled                           Gait Training                                       Modalities             Ice (PRN)             TENS (PRN)

## 2022-11-26 DIAGNOSIS — E78.2 MIXED HYPERLIPIDEMIA: ICD-10-CM

## 2022-11-26 DIAGNOSIS — I10 ESSENTIAL HYPERTENSION: ICD-10-CM

## 2022-11-27 RX ORDER — AMLODIPINE BESYLATE 5 MG/1
TABLET ORAL
Qty: 30 TABLET | Refills: 0 | Status: SHIPPED | OUTPATIENT
Start: 2022-11-27

## 2022-11-27 RX ORDER — ROSUVASTATIN CALCIUM 40 MG/1
TABLET, COATED ORAL
Qty: 90 TABLET | Refills: 0 | Status: SHIPPED | OUTPATIENT
Start: 2022-11-27

## 2022-11-28 NOTE — PROGRESS NOTES
Daily Note     Today's date: 2022  Patient name: Maurice Dietz  : 1975  MRN: [id number]  Referring provider: Marcos Caceres*  Dx:   Encounter Diagnosis     ICD-10-CM    1  Strain of left shoulder, subsequent encounter  S46 912D       2  Biceps strain, left, subsequent encounter  S46 212D       3  Strain of left pectoralis muscle, subsequent encounter  S29 011D                      Subjective: The patient states that he was sore after his last session  Objective: See treatment diary below      Assessment: Initiated TE as outlined below in daily treatment diary  Tolerated treatment fair  He is limited with A/PROM and strength t/o his shoulder  He declined CP today at end of session, instructed him to ice later at home if needed and he verbalized understanding  Patient demonstrated fatigue post treatment and would benefit from continued PT  Plan: Continue per plan of care  Progress as able in upcoming visits         Precautions: asthma, GERD   * indicates exercises included in HEP    Manuals            L shoulder PROM 5' VR ML            Post & inf GHJ mobs (grade II-III)                          Pt education HEP, POC            Neuro Re-Ed             Blade squeezes * 5"x20 5"x20           TB rows (M/L)             Bent over rows             Wall angels             Touchdown liftoffs              's KB carry                          Ther Ex             UBE  5'           Pullies  Flex 2'           Shoulder rolls (back only) * x20 x20           Table slides (flex/abd) * 5"x20 5"x20           Bicep curls * 5# x20 5# 20x           SA punch             Ball rolls on wall (CW/CCW)             Finger ladder             Pec stretch NV *20"x4           Cane/wand (flex/abd)             Thoracic foam roll progression NV NV           Ulnar nerve glides  NV NV                        Ther Activity             RTW activities              Lift box floor to table Lift box table to [facility]             Push/pull sled                           Gait Training                                       Modalities             Ice (PRN)  Declined           TENS (PRN)

## 2022-11-29 ENCOUNTER — OFFICE VISIT (OUTPATIENT)
Dept: PHYSICAL THERAPY | Facility: CLINIC | Age: 47
End: 2022-11-29

## 2022-11-29 DIAGNOSIS — S46.912D STRAIN OF LEFT SHOULDER, SUBSEQUENT ENCOUNTER: Primary | ICD-10-CM

## 2022-11-29 DIAGNOSIS — S29.011D STRAIN OF LEFT PECTORALIS MUSCLE, SUBSEQUENT ENCOUNTER: ICD-10-CM

## 2022-11-29 DIAGNOSIS — S46.212D BICEPS STRAIN, LEFT, SUBSEQUENT ENCOUNTER: ICD-10-CM

## 2022-12-02 ENCOUNTER — OFFICE VISIT (OUTPATIENT)
Dept: PHYSICAL THERAPY | Facility: CLINIC | Age: 47
End: 2022-12-02

## 2022-12-02 DIAGNOSIS — S46.912D STRAIN OF LEFT SHOULDER, SUBSEQUENT ENCOUNTER: Primary | ICD-10-CM

## 2022-12-02 DIAGNOSIS — S29.011D STRAIN OF LEFT PECTORALIS MUSCLE, SUBSEQUENT ENCOUNTER: ICD-10-CM

## 2022-12-02 DIAGNOSIS — S46.212D BICEPS STRAIN, LEFT, SUBSEQUENT ENCOUNTER: ICD-10-CM

## 2022-12-02 NOTE — PROGRESS NOTES
Daily Note     Today's date: 2022  Patient name: Fady Carney  : 1975  MRN: [id number]  Referring provider: Geovanna Ramsey*  Dx:   Encounter Diagnosis     ICD-10-CM    1  Strain of left shoulder, subsequent encounter  S46 912D       2  Biceps strain, left, subsequent encounter  S46 212D       3  Strain of left pectoralis muscle, subsequent encounter  S29 011D           Start Time: 1015  Stop Time: 1100  Total time in clinic (min): 45 minutes    Subjective: *Pt reports pain is sig elevated today, he reports no changes in activities since previous session and no medical updates  Objective: See treatment diary below      Assessment: Kirill tolerates treatment fair with consistent cuing throughout  TE's were performed with decreased resistance and decreased reps  No new TE's were performed today  Following treatment, the patient exhibited good technique with therapeutic exercises and would benefit from continued PT  Plan: Continue per plan of care        Precautions: asthma, GERD   * indicates exercises included in HEP    Manuals           L shoulder PROM 5' VR ML  FH          Post & inf GHJ mobs (grade II-III)   FH          MCr   2 min, 1 pump          Pt education HEP, POC            Neuro Re-Ed             Blade squeezes * 5"x20 5"x20 5''x20          TB rows (M/L)             Bent over rows             Wall angels             Touchdown liftoffs              's KB carry                          Ther Ex             UBE  5' 5'          Pullies  Flex 2' Flex 2'          Shoulder rolls (back only) * x20 x20 x20          Table slides (flex/abd) * 5"x20 5"x20 5''x15          Bicep curls * 5# x20 5# 20x 20x no rivas          SA punch             Ball rolls on wall (CW/CCW)             Finger ladder             Pec stretch NV *20"x4 hold          Cane/wand (flex/abd)             Thoracic foam roll progression NV NV NC          Ulnar nerve glides  NV NV NC Submax flex, ext, IR, ER brittney   5''x10          No monies   AROM 2x10          Ther Activity             RTW activities              Lift box floor to table             Lift box table to [place]             Push/pull sled                           Gait Training                                       Modalities             Ice (PRN)  Declined           TENS (PRN)

## 2022-12-04 DIAGNOSIS — J45.909 UNCOMPLICATED ASTHMA, UNSPECIFIED ASTHMA SEVERITY, UNSPECIFIED WHETHER PERSISTENT: ICD-10-CM

## 2022-12-05 RX ORDER — ALBUTEROL SULFATE 90 UG/1
AEROSOL, METERED RESPIRATORY (INHALATION)
Qty: 54 G | Refills: 0 | Status: SHIPPED | OUTPATIENT
Start: 2022-12-05 | End: 2022-12-06

## 2022-12-06 ENCOUNTER — OFFICE VISIT (OUTPATIENT)
Dept: PHYSICAL THERAPY | Facility: CLINIC | Age: 47
End: 2022-12-06

## 2022-12-06 DIAGNOSIS — S29.011D STRAIN OF LEFT PECTORALIS MUSCLE, SUBSEQUENT ENCOUNTER: ICD-10-CM

## 2022-12-06 DIAGNOSIS — J45.909 UNCOMPLICATED ASTHMA, UNSPECIFIED ASTHMA SEVERITY, UNSPECIFIED WHETHER PERSISTENT: ICD-10-CM

## 2022-12-06 DIAGNOSIS — S46.912D STRAIN OF LEFT SHOULDER, SUBSEQUENT ENCOUNTER: Primary | ICD-10-CM

## 2022-12-06 DIAGNOSIS — S46.212D BICEPS STRAIN, LEFT, SUBSEQUENT ENCOUNTER: ICD-10-CM

## 2022-12-06 RX ORDER — ALBUTEROL SULFATE 90 UG/1
AEROSOL, METERED RESPIRATORY (INHALATION)
Qty: 54 G | Refills: 0 | Status: SHIPPED | OUTPATIENT
Start: 2022-12-06

## 2022-12-06 NOTE — PROGRESS NOTES
Daily Note     Today's date: 2022  Patient name: Judy Navarro  : 1975  MRN: [id number]  Referring provider: Katrina Paget*  Dx:   Encounter Diagnosis     ICD-10-CM    1  Strain of left shoulder, subsequent encounter  S46 912D       2  Biceps strain, left, subsequent encounter  S46 212D       3  Strain of left pectoralis muscle, subsequent encounter  S29 011D                      Subjective: Pt reports that he was sore for about a day and a half after last visit  Objective: See treatment diary below      Assessment: Pt continued to have high sx irritability  Unable to progress program secondary to pain however he was able to integrate bicep curls back into his program  He demonstrated empty end feels in all directions  Trial of subscap release to improve ROM however pt continued have ROM restrictions  Continue to progress as able  Plan: Continue per plan of care        Precautions: asthma, GERD   * indicates exercises included in HEP    Manuals          L shoulder PROM 5' VR ML  FH KB         Post & inf GHJ mobs (grade II-III)   FH KB gr 2          MCr   2 min, 1 pump supscap release KB         Pt education HEP, POC            Neuro Re-Ed             Blade squeezes * 5"x20 5"x20 5''x20 5''x20         TB rows (M/L)             Bent over rows             Wall angels             Touchdown liftoffs              's KB carry                          Ther Ex             UBE for posture  5' 5' 5'         Pullies  Flex 2' Flex 2' Flex 2'         Shoulder rolls (back only) * x20 x20 x20 x20         Table slides (flex/abd) * 5"x20 5"x20 5''x15 5''x15         Bicep curls * 5# x20 5# 20x 20x no rivas 5# 20x         SA punch             Ball rolls on wall (CW/CCW)             Finger ladder             Pec stretch NV *20"x4 hold          Cane/wand (flex/abd)             Thoracic foam roll progression NV NV NC          Ulnar nerve glides  NV NV NC          Submax flex, ext, IR, ER brittney   5''x10          No monies   AROM 2x10 AROM 2x10         Ther Activity             RTW activities              Lift box floor to table             Lift box table to [place]             Push/pull sled                           Gait Training                                       Modalities             Ice (PRN)  Declined           TENS (PRN)

## 2022-12-07 DIAGNOSIS — K86.1 CHRONIC PANCREATITIS, UNSPECIFIED PANCREATITIS TYPE (HCC): ICD-10-CM

## 2022-12-07 RX ORDER — PANCRELIPASE 60000; 12000; 38000 [USP'U]/1; [USP'U]/1; [USP'U]/1
CAPSULE, DELAYED RELEASE PELLETS ORAL
Qty: 90 CAPSULE | Refills: 0 | Status: SHIPPED | OUTPATIENT
Start: 2022-12-07

## 2022-12-09 ENCOUNTER — OFFICE VISIT (OUTPATIENT)
Dept: PHYSICAL THERAPY | Facility: CLINIC | Age: 47
End: 2022-12-09

## 2022-12-09 DIAGNOSIS — S29.011D STRAIN OF LEFT PECTORALIS MUSCLE, SUBSEQUENT ENCOUNTER: ICD-10-CM

## 2022-12-09 DIAGNOSIS — S46.212D BICEPS STRAIN, LEFT, SUBSEQUENT ENCOUNTER: ICD-10-CM

## 2022-12-09 DIAGNOSIS — S46.912D STRAIN OF LEFT SHOULDER, SUBSEQUENT ENCOUNTER: Primary | ICD-10-CM

## 2022-12-09 NOTE — PROGRESS NOTES
Daily Note     Today's date: 2022  Patient name: Washington Grajeda  : 1975  MRN: [id number]  Referring provider: Mary House*  Dx:   Encounter Diagnosis     ICD-10-CM    1  Strain of left shoulder, subsequent encounter  S46 912D       2  Biceps strain, left, subsequent encounter  S46 212D       3  Strain of left pectoralis muscle, subsequent encounter  S29 011D                      Subjective: Pt reports no changes in L pec pain  Objective: See treatment diary below      Assessment: Empty end feel during PROM secondary to pain, primarily in flexion/abd  Pt completes charted exercises within pain tolerance although high sx irritability remains, primarily in lower pectoral region  No improvement in sx post session  Plan: Continue per plan of care        Precautions: asthma, GERD   * indicates exercises included in HEP    Manuals         L shoulder PROM 5' VR ML  FH KB AF        Post & inf GHJ mobs (grade II-III)   FH KB gr 2          MCr   2 min, 1 pump supscap release KB         Pt education HEP, POC            Neuro Re-Ed             Blade squeezes * 5"x20 5"x20 5''x20 5''x20         TB rows (M/L)             Bent over rows             Wall angels             Touchdown liftoffs              's KB carry             horiz add iso     3"x20        ecc shoulder adduction     x10 YTB        Ther Ex             UBE for posture  5' 5' 5' 5'        Pullies  Flex 2' Flex 2' Flex 2'         Shoulder rolls (back only) * x20 x20 x20 x20         Table slides (flex/abd) * 5"x20 5"x20 5''x15 5''x15         Bicep curls * 5# x20 5# 20x 20x no rivas 5# 20x         SA punch             Ball rolls on wall (CW/CCW)             Finger ladder             Pec stretch NV *20"x4 hold          Cane/wand (flex/abd)     10"x10        Thoracic foam roll progression NV NV NC          Ulnar nerve glides  NV NV NC          Submax flex, ext, IR, ER brittney   5''x10          No monies AROM 2x10 AROM 2x10 2x10        Cane chest press     2x10        Ther Activity             RTW activities              Lift box floor to table             Lift box table to [facility]             Push/pull sled                           Gait Training                                       Modalities             Ice (PRN)  Declined           TENS (PRN)

## 2022-12-14 ENCOUNTER — OFFICE VISIT (OUTPATIENT)
Dept: FAMILY MEDICINE CLINIC | Facility: CLINIC | Age: 47
End: 2022-12-14

## 2022-12-14 VITALS
DIASTOLIC BLOOD PRESSURE: 70 MMHG | SYSTOLIC BLOOD PRESSURE: 118 MMHG | WEIGHT: 201 LBS | HEART RATE: 75 BPM | OXYGEN SATURATION: 98 % | BODY MASS INDEX: 28.14 KG/M2 | HEIGHT: 71 IN

## 2022-12-14 DIAGNOSIS — I10 ESSENTIAL HYPERTENSION: ICD-10-CM

## 2022-12-14 DIAGNOSIS — E78.1 HYPERTRIGLYCERIDEMIA: ICD-10-CM

## 2022-12-14 DIAGNOSIS — K86.1 CHRONIC PANCREATITIS, UNSPECIFIED PANCREATITIS TYPE (HCC): ICD-10-CM

## 2022-12-14 DIAGNOSIS — R10.31 RIGHT GROIN PAIN: Primary | ICD-10-CM

## 2022-12-14 NOTE — PROGRESS NOTES
Assessment/Plan:     Chronic Problems:  No problem-specific Assessment & Plan notes found for this encounter  Visit Diagnosis:  Diagnoses and all orders for this visit:    Right groin pain  Comments:  Discussed findings, recommend ultrasound, advise any changes worsening will need ER evaluation, avoidance of lifting heavy items negative straining  Orders:  -     US groin/inguinal area; Future  -     US abdomen limited; Future  -     CBC and differential; Future  -     UA w Reflex to Microscopic w Reflex to Culture -Lab Collect; Future    Hypertriglyceridemia  Comments:  Continue current medications per    Chronic pancreatitis, [facility] ([facility] )  Comments:  Stable, continue current care follow-up with endocrine    Essential hypertension  Comments:  Stable, continue current medications          Subjective:    Patient ID: Fawad Mancuso is a 50 y o  male      Right groin started yesterday     +reproducible with cough  No notable bulge negative testicular enlargement swelling tenderness  Negative dysuria fevers chills neg v/n/d/c/, appetite unchanged, negative abdominal pain, negative yellowing of eyes skin changes swelling to extremities  pmed hx ,, seen, liver early stage cirrhosis, fatty  Liver , hyperlipidemia , htn   Current meds , neg changess   Continues to follow with endocrinology, in regard to pancreatitis  (htn/cholesterol continues medicationsCrestor, omega-3's Lovaza, Creon, blood pressure continues lisinopril, amlodipine Inderal for tremors        The following portions of the patient's history were reviewed and updated as appropriate: allergies, current medications, past family history, past medical history, past social history, past surgical history and problem list     Review of Systems      /70   Pulse 75   Ht 5' 11" (1 803 m)   Wt 91 2 kg (201 lb)   SpO2 98%   BMI 28 03 kg/m²   Social History     Socioeconomic History   • Marital status: /Civil Union Spouse name: Not on file   • Number of children: Not on file   • Years of education: Not on file   • Highest education level: Not on file   Occupational History   • Occupation: fulltime employment   Tobacco Use   • Smoking status: Former     Packs/day: 1 00     Years: 20 00     Pack years: 20 00     Types: Cigarettes     Quit date: 3/15/2021     Years since quittin 7   • Smokeless tobacco: Never   Vaping Use   • Vaping Use: Never used   Substance and Sexual Activity   • Alcohol use:  Yes     Alcohol/week: 10 0 standard drinks     Types: 10 Cans of beer per week     Comment: on weekends   • Drug use: No   • Sexual activity: Yes     Partners: Female   Other Topics Concern   • Not on file   Social History Narrative    Lives with family    Daily caffeine consumption     Social Determinants of Health     Financial Resource Strain: Not on file   Food Insecurity: Not on file   Transportation Needs: Not on file   Physical Activity: Not on file   Stress: Not on file   Social Connections: Not on file   Intimate Partner Violence: Not on file   Housing Stability: Not on file     Past Medical History:   Diagnosis Date   • Asthma    • Chronic pain disorder    • GERD (gastroesophageal reflux disease)    • Hyperlipidemia    • Liver abscess    • Meniscus tear     left knee work injury last assessed 2016   • Pancreatitis    • Pneumonia      Family History   Problem Relation Age of Onset   • Cirrhosis Mother    • Heart disease Other         cardiac disorder   • Cancer Other      Past Surgical History:   Procedure Laterality Date   • CELIAC PLEXUS BLOCK Bilateral 10/29/2018    Procedure: SPLANCHNIC NERVE BLOCK;  Surgeon: Isaac Covington MD;  [facility];  Service: Pain Management    • CELIAC PLEXUS BLOCK Bilateral 1/10/2019    Procedure: SPLANCHNIC NERVE BLOCK;  Surgeon: Isaac Covington MD;  [facility];  Service: Pain Management    • CHOLECYSTECTOMY     • ESOPHAGOGASTRODUODENOSCOPY N/A 2017    Procedure: ESOPHAGOGASTRODUODENOSCOPY (EGD); Surgeon: Blank Bender MD;  [facility]; Service: Gastroenterology   • ESOPHAGOGASTRODUODENOSCOPY N/A 4/19/2018    Procedure: ESOPHAGOGASTRODUODENOSCOPY (EGD); Surgeon: Jules Lopez MD;  [facility]; Service: Gastroenterology   • ESOPHAGOGASTRODUODENOSCOPY N/A 5/10/2018    Procedure: ESOPHAGOGASTRODUODENOSCOPY (EGD); Surgeon: Anitra Bullock MD;  [facility];   Service: Gastroenterology   • IR TEMPORARY DIALYSIS CATHETER PLACEMENT  2/28/2019   • KNEE ARTHROSCOPY Bilateral    • KNEE ARTHROSCOPY Right 2009    cibishino last assessed 08/24/2016   • KNEE ARTHROSCOPY Right 07/01/2019   • LIVER SURGERY     • NERVE BLOCK Bilateral 5/29/2018    Procedure: SPLANCHNIC NERVE BLOCK;  Surgeon: Colton Lu MD;  [facility];  Service: Pain Management    • PANCREAS SURGERY      stents   • PANCREATIC CYST EXCISION     • NJ INJECT NERV BLCK,CELIAC PLEXUS Bilateral 5/9/2017    Procedure: CELIAC PLEXUS BLOCK ;  Surgeon: Colton Lu MD;  [facility];  Service: Pain Management    • NJ INJECT NERV BLCK,CELIAC PLEXUS Bilateral 6/1/2017    Procedure: SPLANCHNIC NERVE BLOCK at T12;  Surgeon: Colton Lu MD;  [facility];  Service: Pain Management    • NJ INJECT NERV BLCK,CELIAC PLEXUS Bilateral 8/8/2017    Procedure: BILATERAL SPLANCHNIC NERVE BLOCK T12;  Surgeon: Colton Lu MD;  [facility];  Service: Pain Management    • NJ INJECT NERV BLCK,CELIAC PLEXUS Bilateral 4/18/2019    Procedure: BLOCK / INJECTION CELIAC PLEXUS;  Surgeon: Colton Lu MD;  [facility];  Service: Pain Management    • NJ INJECT NERV BLCK,CELIAC PLEXUS Bilateral 8/20/2019    Procedure: SPLANCHNIC NERVE BLOCK;  Surgeon: Colton Lu MD;  [facility];  Service: Pain Management    • NJ INJECT NERV BLCK,CELIAC PLEXUS Bilateral 10/17/2019    Procedure: SPLANCHNIC NERVE BLOCK;  Surgeon: Colton Lu MD;  [facility];  Service: Pain Management    • AR INJECT NERV BLCK,PARAVERT SYMPATH Bilateral 12/28/2017    Procedure: SPLANCHNIC NERVE BLOCK;  Surgeon: Emilie Escobedo MD;  [facility];  Service: Pain Management    • AR LAP,CHOLECYSTECTOMY N/A 2/14/2017    Procedure: LAPAROSCOPIC CHOLECYSTECTOMY, IOC, POSSIBLE OPEN ;  Surgeon: Jose Mendez MD;  [facility];  Service: General   • ROTATOR CUFF REPAIR Right    • SHOULDER ARTHROSCOPY     • SHOULDER ARTHROSCOPY Right        Current Outpatient Medications:   •  albuterol (PROVENTIL HFA,VENTOLIN HFA) 90 mcg/act inhaler, INHALE TWO PUFFS BY MOUTH EVERY 6 HOURS AS NEEDED FOR WHEEZING, Disp: 54 g, Rfl: 0  •  amLODIPine (NORVASC) 5 mg tablet, TAKE ONE TABLET BY MOUTH EVERY DAY, Disp: 30 tablet, Rfl: 0  •  Choline Fenofibrate (Fenofibric Acid) 135 MG CPDR, TAKE ONE CAPSULE BY MOUTH EVERY DAY, Disp: 90 capsule, Rfl: 0  •  Choline Fenofibrate (Fenofibric Acid) 135 MG CPDR, Take 1 capsule (135 mg total) by mouth daily, Disp: 90 capsule, Rfl: 0  •  Creon 64074-49196 units capsule, take 1 capsule of 12,000 units of lipase by mouth up to 3 times daily as needed for snacks, Disp: 90 capsule, Rfl: 0  •  Diclofenac Sodium (VOLTAREN) 1 %, Apply 2 g topically 4 (four) times a day, Disp: 150 g, Rfl: 3  •  Empagliflozin 10 MG TABS, Take 10 mg by mouth every morning, Disp: , Rfl:   •  esomeprazole (NexIUM) 40 MG capsule, Take 1 capsule (40 mg total) by mouth 2 (two) times a day before meals, Disp: 60 capsule, Rfl: 11  •  famotidine (PEPCID) 40 MG tablet, Take 40 mg by mouth daily at bedtime as needed , Disp: , Rfl:   •  insulin degludec Maye Frieze FlexTouch) 100 units/mL injection pen, 20 units subcut in hs, Disp: , Rfl:   •  Insulin Pen Needle (Pen Needles) 32G X 5 MM MISC, use once daily as directed, Disp: , Rfl:   •  lisinopril (ZESTRIL) 20 mg tablet, Take 1 tablet (20 mg total) by mouth daily, Disp: 30 tablet, Rfl: 5  •  omega-3-acid ethyl esters (LOVAZA) 1 g capsule, Take 2 capsules (2 g total) by mouth 2 (two) times a day, Disp: 360 capsule, Rfl: 0  •  ondansetron (ZOFRAN-ODT) 4 mg disintegrating tablet, Take 1 tablet (4 mg total) by mouth every 8 (eight) hours, Disp: 30 tablet, Rfl: 0  •  Pancrelipase, Lip-Prot-Amyl, (Creon) 88222-497239 units CPEP, Take 1 capsule (36,000 Units total) by mouth 3 (three) times a day before meals, Disp: 90 capsule, Rfl: 0  •  propranolol (INDERAL) 20 mg tablet, TAKE 1 TABLET BY MOUTH TWICE DAILY, Disp: 60 tablet, Rfl: 5  •  rosuvastatin (CRESTOR) 40 MG tablet, TAKE ONE TABLET BY MOUTH EVERY DAY, Disp: 90 tablet, Rfl: 0  •  traZODone (DESYREL) 50 mg tablet, Take 1 tablet (50 mg total) by mouth daily at bedtime, Disp: 30 tablet, Rfl: 0  •  ergocalciferol (ERGOCALCIFEROL) 1 25 MG (67508 UT) capsule, Take 50,000 Units by mouth, Disp: , Rfl:     Allergies   Allergen Reactions   • Bee Venom Swelling          Lab Review   Admission on 08/03/2022, Discharged on 08/03/2022   Component Date Value   • WBC 08/03/2022 8 15    • RBC 08/03/2022 5 03    • Hemoglobin 08/03/2022 16 5    • Hematocrit 08/03/2022 50 0 (H)    • MCV 08/03/2022 99 (H)    • MCH 08/03/2022 32 8    • MCHC 08/03/2022 33 0    • RDW 08/03/2022 13 2    • MPV 08/03/2022 9 8    • Platelets 63/76/7928 202    • nRBC 08/03/2022 0    • Neutrophils Relative 08/03/2022 50    • Immat GRANS % 08/03/2022 0    • Lymphocytes Relative 08/03/2022 39    • Monocytes Relative 08/03/2022 9    • Eosinophils Relative 08/03/2022 1    • Basophils Relative 08/03/2022 1    • Neutrophils Absolute 08/03/2022 4 09    • Immature Grans Absolute 08/03/2022 0 02    • Lymphocytes Absolute 08/03/2022 3 16    • Monocytes Absolute 08/03/2022 0 74    • Eosinophils Absolute 08/03/2022 0 08    • Basophils Absolute 08/03/2022 0 06    • Sodium 08/03/2022 134 (L)    • Potassium 08/03/2022 5 6 (H)    • Chloride 08/03/2022 100    • CO2 08/03/2022 21    • ANION GAP 08/03/2022 13    • BUN 08/03/2022 26 (H)    • Creatinine 08/03/2022 0 85    • Glucose 08/03/2022 119    • Calcium 08/03/2022 8 6    • eGFR 08/03/2022 102    • Color, UA 08/03/2022 Yellow    • Clarity, UA 08/03/2022 Clear    • Specific Gravity, UA 08/03/2022 1 010    • pH, UA 08/03/2022 6 5    • Leukocytes, UA 08/03/2022 Negative    • Nitrite, UA 08/03/2022 Negative    • Protein, UA 08/03/2022 Negative    • Glucose, UA 08/03/2022 500 (1/2%) (A)    • Ketones, UA 08/03/2022 Negative    • Urobilinogen, UA 08/03/2022 1 0    • Bilirubin, UA 08/03/2022 Negative    • Occult Blood, UA 08/03/2022 Negative    • Potassium 08/03/2022 3 9    Orders Only on 06/16/2022   Component Date Value   • Glucose, Random 06/16/2022 121 (H)    • BUN 06/16/2022 10    • Creatinine 06/16/2022 0 54 (L)    • eGFR Non  06/16/2022 123    • eGFR  06/16/2022 143    • SL AMB BUN/CREATININE RA* 06/16/2022 19    • Sodium 06/16/2022 140    • Potassium 06/16/2022 4 0    • Chloride 06/16/2022 104    • CO2 06/16/2022 27    • Calcium 06/16/2022 9 9    • Protein, Total 06/16/2022 6 7    • Albumin 06/16/2022 4 7    • Globulin 06/16/2022 2 0    • Albumin/Globulin Ratio 06/16/2022 2 4    • TOTAL BILIRUBIN 06/16/2022 1 0    • Alkaline Phosphatase 06/16/2022 131 (H)    • AST 06/16/2022 52 (H)    • ALT 06/16/2022 58 (H)    • White Blood Cell Count 06/16/2022 4 2    • Red Blood Cell Count 06/16/2022 4 06 (L)    • Hemoglobin 06/16/2022 13 3    • HCT 06/16/2022 39 1    • MCV 06/16/2022 96 3    • MCH 06/16/2022 32 8    • MCHC 06/16/2022 34 0    • RDW 06/16/2022 13 2    • Platelet Count 35/60/2561 111 (L)    • SL AMB MPV 06/16/2022 11 2    • Neutrophils (Absolute) 06/16/2022 2,310    • Lymphocytes (Absolute) 06/16/2022 1,428    • Monocytes (Absolute) 06/16/2022 391    • Eosinophils (Absolute) 06/16/2022 50    • Basophils ABS 06/16/2022 21    • Neutrophils 06/16/2022 55    • Lymphocytes 06/16/2022 34 0    • Monocytes 06/16/2022 9 3    • Eosinophils 06/16/2022 1 2    • Basophils PCT 06/16/2022 0 5    • TSH 06/16/2022 1 39 Imaging: No results found  Objective:     Physical Exam  Constitutional:       General: He is not in acute distress  Appearance: He is not ill-appearing  HENT:      Head: Normocephalic and atraumatic  Cardiovascular:      Rate and Rhythm: Normal rate  Pulmonary:      Effort: Pulmonary effort is normal    Abdominal:      General: Abdomen is protuberant  Bowel sounds are normal       Palpations: Abdomen is soft  Tenderness: There is abdominal tenderness in the right lower quadrant  There is no right CVA tenderness, left CVA tenderness or guarding  Negative signs include Vasquez's sign and McBurney's sign  Hernia: A hernia is present  Hernia is present in the right inguinal area  Genitourinary:     Penis: Normal        Testes: Normal    Musculoskeletal:         General: Normal range of motion  Left lower leg: No edema  Lymphadenopathy:      Cervical: No cervical adenopathy  Skin:     General: Skin is warm and dry  Coloration: Skin is not jaundiced  Neurological:      Mental Status: He is oriented to person, place, and time  There are no Patient Instructions on file for this visit  LUANA Hernandez    Portions of the record may have been created with voice recognition software  Occasional wrong word or "sound a like" substitutions may have occurred due to the inherent limitations of voice recognition software  Read the chart carefully and recognize, using context, where substitutions have occurred

## 2022-12-16 ENCOUNTER — HOSPITAL ENCOUNTER (OUTPATIENT)
Dept: ULTRASOUND IMAGING | Facility: CLINIC | Age: 47
Discharge: HOME | End: 2022-12-16

## 2022-12-16 ENCOUNTER — APPOINTMENT (OUTPATIENT)
Dept: RADIOLOGY | Facility: CLINIC | Age: 47
End: 2022-12-16

## 2022-12-16 ENCOUNTER — OFFICE VISIT (OUTPATIENT)
Dept: OBGYN CLINIC | Facility: CLINIC | Age: 47
End: 2022-12-16

## 2022-12-16 VITALS
DIASTOLIC BLOOD PRESSURE: 76 MMHG | WEIGHT: 205 LBS | BODY MASS INDEX: 28.7 KG/M2 | HEIGHT: 71 IN | SYSTOLIC BLOOD PRESSURE: 128 MMHG | HEART RATE: 71 BPM

## 2022-12-16 DIAGNOSIS — S29.011D STRAIN OF LEFT PECTORALIS MUSCLE, SUBSEQUENT ENCOUNTER: ICD-10-CM

## 2022-12-16 DIAGNOSIS — R10.31 RIGHT GROIN PAIN: ICD-10-CM

## 2022-12-16 DIAGNOSIS — S49.92XD INJURY OF LEFT SHOULDER, SUBSEQUENT ENCOUNTER: ICD-10-CM

## 2022-12-16 DIAGNOSIS — S49.92XD INJURY OF LEFT SHOULDER, SUBSEQUENT ENCOUNTER: Primary | ICD-10-CM

## 2022-12-16 NOTE — LETTER
December 16, 2022     Patient: Sherri Edwards  YOB: 1975  Date of Visit: 12/16/2022      To Whom it May Concern:    Sherri Edwards is under my professional care  Yamile Paz was seen in my office on 12/16/2022  Yamile Paz is unable to return to work at this time      He will be re-evaluated after getting MRI done  If you have any questions or concerns, please don't hesitate to call           Sincerely,          Miley Cortes DO        CC: No Recipients

## 2022-12-16 NOTE — PROGRESS NOTES
Assessment/Plan:  Assessment/Plan   Diagnoses and all orders for this visit:    Injury of left shoulder, subsequent encounter  -     XR shoulder 2+ vw left; Future  -     MRI shoulder left wo contrast; Future    Strain of left pectoralis muscle, subsequent encounter          43-year-old right-hand-dominant male with onset of left chest wall and left shoulder pain from injury at work 09/09/2022  Discussed with patient physical exam, radiographs, impression and plan  X-rays left shoulder are unremarkable for acute osseous abnormality  Physical exam left shoulder noted for tenderness at the anterior and lateral aspects  He also has tenderness at the left pectoralis  He left shoulder has range of motion limited to forward flexion of 90°, abduction 90°, and internal rotation lumbar spine  He has 4+/5 to internal rotation, external rotation, and symptoms  There is pain with empty can test and there is positive Vitale maneuver  He is more than 3 months since injury and still having symptoms despite conservative management of topical diclofenac gel for more than 2 months, alternating between ice and heat, formal therapy and home exercises since 11/22/2022  At this time I will refer him for MRI of the left shoulder to evaluate for rotator cuff tear and impingement as invasive management may be warranted  He will follow up after getting MRI done  Subjective:   Patient ID: Dulady Moya is a 50 y o  male  Chief Complaint   Patient presents with   • Left Shoulder - Follow-up, Pain       43-year-old right-hand-dominant male following up for onset of left chest wall and shoulder pain from injury at work on 09/09/2022  He was last seen by me 6 weeks ago which point he was referred to formal therapy  He has been attending formal therapy and doing home exercises since 11/22/2022  He denies improvement in symptoms since last visit    He has pain described as localized to the left chest wall and anterior aspect of left shoulder, radiating distally along the anterior aspect the upper arm to the elbow, achy and burning and sometimes sharp, worse with elevating the arm and lifting, associated with limited range of motion and weakness, and improved with resting  He states that over the holidays he attempted lifting a 15 lb turkey and had difficulty doing so  Shoulder Pain  This is a new problem  The current episode started more than 1 month ago  The problem occurs daily  The problem has been unchanged  Associated symptoms include arthralgias and weakness  Pertinent negatives include no joint swelling or numbness  Exacerbated by: Arm use  He has tried rest, immobilization, NSAIDs, ice and heat (Physical therapy, home exercise) for the symptoms  The treatment provided mild relief  Review of Systems   Musculoskeletal: Positive for arthralgias  Negative for joint swelling  Neurological: Positive for weakness  Negative for numbness  Objective:  Vitals:    12/16/22 1058   BP: 128/76   Pulse: 71   Weight: 93 kg (205 lb)   Height: 5' 11" (1 803 m)     Left Elbow Exam     Muscle Strength   Pronation:  4/5   Supination:  4/5       Left Shoulder Exam     Tenderness   The patient is experiencing tenderness in the biceps tendon (Anterior, lateral, pectoral)  Range of Motion   Active abduction: 90   Forward flexion: 90   Internal rotation 0 degrees: Lumbar     Muscle Strength   Left shoulder normal muscle strength: 4+/5 external rotation, internal rotation, supraspinatus  Abduction: 5/5     Tests   Vitale test: positive    Comments:  Positive empty can test  Negative belly press            Physical Exam  Vitals and nursing note reviewed  Constitutional:       General: He is not in acute distress  Appearance: He is well-developed  He is not ill-appearing or diaphoretic  HENT:      Head: Normocephalic and atraumatic        Right Ear: External ear normal       Left Ear: External ear normal    Eyes: Conjunctiva/sclera: Conjunctivae normal    Neck:      Trachea: No tracheal deviation  Cardiovascular:      Rate and Rhythm: Normal rate  Pulmonary:      Effort: Pulmonary effort is normal  No respiratory distress  Abdominal:      General: There is no distension  Musculoskeletal:         General: Tenderness present  No swelling, deformity or signs of injury  Skin:     General: Skin is warm and dry  Coloration: Skin is not jaundiced or pale  Neurological:      Mental Status: He is alert and oriented to person, place, and time  Psychiatric:         Mood and Affect: Mood normal          Behavior: Behavior normal          Thought Content: Thought content normal          Judgment: Judgment normal          I have personally reviewed pertinent films in PACS and my interpretation is No acute osseous abnormality of left shoulder

## 2022-12-17 LAB
APPEARANCE UR: CLEAR
BACTERIA UR QL AUTO: ABNORMAL /HPF
BASOPHILS # BLD AUTO: 53 CELLS/UL (ref 0–200)
BASOPHILS NFR BLD AUTO: 0.9 %
BILIRUB UR QL STRIP: NEGATIVE
COLOR UR: YELLOW
EOSINOPHIL # BLD AUTO: 118 CELLS/UL (ref 15–500)
EOSINOPHIL NFR BLD AUTO: 2 %
ERYTHROCYTE [DISTWIDTH] IN BLOOD BY AUTOMATED COUNT: 13.1 % (ref 11–15)
GLUCOSE UR QL STRIP: ABNORMAL
HCT VFR BLD AUTO: 38 % (ref 38.5–50)
HGB BLD-MCNC: 12.8 G/DL (ref 13.2–17.1)
HGB UR QL STRIP: NEGATIVE
HYALINE CASTS #/AREA URNS LPF: ABNORMAL /LPF
KETONES UR QL STRIP: NEGATIVE
LEUKOCYTE ESTERASE UR QL STRIP: NEGATIVE
LYMPHOCYTES # BLD AUTO: 2065 CELLS/UL (ref 850–3900)
LYMPHOCYTES NFR BLD AUTO: 35 %
MCH RBC QN AUTO: 33.2 PG (ref 27–33)
MCHC RBC AUTO-ENTMCNC: 33.7 G/DL (ref 32–36)
MCV RBC AUTO: 98.4 FL (ref 80–100)
MONOCYTES # BLD AUTO: 472 CELLS/UL (ref 200–950)
MONOCYTES NFR BLD AUTO: 8 %
NEUTROPHILS # BLD AUTO: 3192 CELLS/UL (ref 1500–7800)
NEUTROPHILS NFR BLD AUTO: 54.1 %
NITRITE UR QL STRIP: NEGATIVE
PH UR STRIP: 7.5 [PH] (ref 5–8)
PLATELET # BLD AUTO: 99 THOUSAND/UL (ref 140–400)
PMV BLD REES-ECKER: 11.2 FL (ref 7.5–12.5)
PROT UR QL STRIP: NEGATIVE
RBC # BLD AUTO: 3.86 MILLION/UL (ref 4.2–5.8)
RBC #/AREA URNS HPF: ABNORMAL /HPF
SP GR UR STRIP: 1.01 (ref 1–1.03)
SQUAMOUS #/AREA URNS HPF: ABNORMAL /HPF
WBC # BLD AUTO: 5.9 THOUSAND/UL (ref 3.8–10.8)
WBC #/AREA URNS HPF: ABNORMAL /HPF

## 2022-12-19 ENCOUNTER — TELEPHONE (OUTPATIENT)
Dept: OBGYN CLINIC | Facility: HOSPITAL | Age: 47
End: 2022-12-19

## 2022-12-19 NOTE — TELEPHONE ENCOUNTER
Caller: Lissa Corado calling in from [place] [address]  insurance     Doctor/Office: Dr Frantz Powers    #: [phone]      What needs to be faxed: She is calling in wanting to obtain office notes from 12/16 faxed over to her          Fax#: [phone]      Documents were successfully e-faxed no concerns

## 2022-12-20 ENCOUNTER — EVALUATION (OUTPATIENT)
Dept: PHYSICAL THERAPY | Facility: CLINIC | Age: 47
End: 2022-12-20

## 2022-12-20 DIAGNOSIS — S29.011D STRAIN OF LEFT PECTORALIS MUSCLE, SUBSEQUENT ENCOUNTER: ICD-10-CM

## 2022-12-20 DIAGNOSIS — S46.912D STRAIN OF LEFT SHOULDER, SUBSEQUENT ENCOUNTER: Primary | ICD-10-CM

## 2022-12-20 DIAGNOSIS — S46.212D BICEPS STRAIN, LEFT, SUBSEQUENT ENCOUNTER: ICD-10-CM

## 2022-12-20 NOTE — PROGRESS NOTES
PT Re-evaluation     Today's date: 2022  Patient name: Génesis Chand  : 1975  MRN: [id number]  Referring provider: Aristides Hooker DO  Dx:   Encounter Diagnosis     ICD-10-CM    1  Strain of left shoulder, subsequent encounter  S46 912D Ambulatory Referral to Physical Therapy      2  Biceps strain, left, subsequent encounter  S46 212D Ambulatory Referral to Physical Therapy      3  Strain of left pectoralis muscle, subsequent encounter  S29 011D Ambulatory Referral to Physical Therapy                     Assessment  Assessment details: Pt is a 50 y o  male who presents to OP PT for IE following referral for L shoulder strain, L pec strain, and L biceps strain  Pt c/o pain in L pec region following injury at work in September  Since beginning physical therapy, pt has not reported any improvement in pain or ROM to this point  He is completing HEP at home  L shld MRI scheduled for January  Due to lack of improvement, along with continued high levels of pain and severe lack of function, referring pt back to MD for further testing  Discussed with pt continuing HEP in the meantime  Understanding of Dx/Px/POC: good   Prognosis: good    Goals  STG (to be achieved w/i 2-4 weeks) - NOT MET  1  Pt will be I w/ initial HEP and report daily compliance  2  Pt will improve LUE AROM to WNL to promote ease w/ functional mobility  3  Pt will improve LUE strength to WNL to promote ease w/ functional mobility  LTGs (to be achieved within 6-8 weeks) - NOT MET  1  Pt will be I with HEP at d/c to promote PT carry-over  2  Pt will meet FOTO predicted score  3  Pt will report no >2/10 pain w/ all activities to indicate a significant improvement in activity tolerance and pain  4  Pt will be able to lift 20# OH and push-pull 100# sled without large increases in pain  5  Pt will be able to RTW full duty         Plan: Pt on hold      Subjective Evaluation    History of Present Illness  Mechanism of injury: Pt c/o L shoulder and chest wall pain from injury that occurred at work on 9/9/22 when pt was trying to move something heavy and heard audible pop  He reports no improvement in pain or ROM since beginning physical therapy  He reports sometimes therapist PROM feels a little looser after, but makes his shoulder very painful the next day  Pain location L chest and anterior aspect of shoulder  Pt rates pain as 10/10 W and 3/10 C/B  Some tingling into L pinky on palm side  Aggravating factors include: lifting grocery bags, donning shirt/jacket  Alleviating factors include: rest, ice            Objective     Postural Observations  Seated posture: fair  Standing posture: fair    Additional Postural Observation Details  Forward head & rounded shoulders    Palpation   Left   Tenderness of the pectoralis major  Left Shoulder Comments  Left pectoralis major: extreme tenderness  Tenderness     Left Shoulder   Tenderness in the coracoid process  Cervical/Thoracic Screen   Cervical range of motion within normal limits  Thoracic range of motion within normal limits    Active Range of Motion   Left Shoulder   Flexion: 98 (pain above 90) degrees   Abduction: 100 (pain above 90) degrees   External rotation 0°: WFL and with pain  Internal rotation BTB: L4 with pain    Right Shoulder   Normal active range of motion    Passive Range of Motion   Left Shoulder   Flexion: 105 degrees with pain  Abduction: 111 degrees with pain  External rotation 45°: 15 degrees with pain  Internal rotation 45°: WFL and with pain    Right Shoulder   Normal passive range of motion    Scapular Mobility   Left Shoulder   Scapular Dyskinesis: none  Scapular mobility: WFL    Right Shoulder   Scapular Dyskinesis: none  Scapular mobility: [facility]    Joint Play   Left Shoulder  Joints within functional limits are the inferior capsule and cervical spine  Hypomobile in the posterior capsule and thoracic spine      Strength/Myotome Testing     Left Shoulder Planes of Motion   Flexion: 3+   Abduction: 3+   External rotation at 0°: 3+   Internal rotation at 0°: 3+     Isolated Muscles   Biceps: 4-   Pectoralis major: 3   Triceps: 4     Right Shoulder   Normal muscle strength    Tests     Left Shoulder   Positive ULTT3                Precautions: asthma, GERD   * indicates exercises included in HEP       Manuals 11/22 11/29 12/2 12/6 12/9 12/20       L shoulder PROM 5' VR ML  FH KB AF        Post & inf GHJ mobs (grade II-III)   FH KB gr 2          MCr   2 min, 1 pump supscap release KB         Pt education HEP, POC     TARUN       Neuro Re-Ed             Blade squeezes * 5"x20 5"x20 5''x20 5''x20         TB rows (M/L)             Bent over rows             Wall angels             Touchdown liftoffs              's KB carry             horiz add iso     3"x20        ecc shoulder adduction     x10 YTB        Ther Ex             UBE for posture  5' 5' 5' 5' 5'       Pullies  Flex 2' Flex 2' Flex 2'         Shoulder rolls (back only) * x20 x20 x20 x20         Table slides (flex/abd) * 5"x20 5"x20 5''x15 5''x15         Bicep curls * 5# x20 5# 20x 20x no rivas 5# 20x         SA punch             Ball rolls on wall (CW/CCW)             Finger ladder             Pec stretch NV *20"x4 hold          Cane/wand (flex/abd)     10"x10        Thoracic foam roll progression NV NV NC          Ulnar nerve glides  NV NV NC          Submax flex, ext, IR, ER brittney   5''x10          No monies   AROM 2x10 AROM 2x10 2x10        Cane chest press     2x10        Ther Activity             RTW activities              Lift box floor to table             Lift box table to [place]             Push/pull sled                           Gait Training                                       Modalities             Ice (PRN)  Declined           TENS (PRN)

## 2022-12-23 ENCOUNTER — APPOINTMENT (OUTPATIENT)
Dept: PHYSICAL THERAPY | Facility: CLINIC | Age: 47
End: 2022-12-23

## 2022-12-27 ENCOUNTER — APPOINTMENT (OUTPATIENT)
Dept: PHYSICAL THERAPY | Facility: CLINIC | Age: 47
End: 2022-12-27

## 2022-12-27 ENCOUNTER — TELEPHONE (OUTPATIENT)
Dept: FAMILY MEDICINE CLINIC | Facility: CLINIC | Age: 47
End: 2022-12-27

## 2022-12-27 DIAGNOSIS — R10.31 RIGHT GROIN PAIN: Primary | ICD-10-CM

## 2022-12-27 NOTE — TELEPHONE ENCOUNTER
Patient is inquiring on results of his CBC, red blood cells and platelets were low  Also what to do next regarding the [address]  Walk in

## 2022-12-28 DIAGNOSIS — I10 ESSENTIAL HYPERTENSION: ICD-10-CM

## 2022-12-28 DIAGNOSIS — D69.59 THROMBOCYTOPENIA, SECONDARY: Primary | ICD-10-CM

## 2022-12-28 RX ORDER — AMLODIPINE BESYLATE 5 MG/1
TABLET ORAL
Qty: 30 TABLET | Refills: 0 | Status: SHIPPED | OUTPATIENT
Start: 2022-12-28

## 2022-12-30 ENCOUNTER — APPOINTMENT (OUTPATIENT)
Dept: PHYSICAL THERAPY | Facility: CLINIC | Age: 47
End: 2022-12-30

## 2023-01-07 DIAGNOSIS — K86.1 CHRONIC PANCREATITIS, UNSPECIFIED PANCREATITIS TYPE (HCC): ICD-10-CM

## 2023-01-08 ENCOUNTER — APPOINTMENT (EMERGENCY)
Dept: ULTRASOUND IMAGING | Facility: HOSPITAL | Age: 48
End: 2023-01-08

## 2023-01-08 ENCOUNTER — HOSPITAL ENCOUNTER (EMERGENCY)
Facility: HOSPITAL | Age: 48
Discharge: HOME | End: 2023-01-09
Attending: EMERGENCY MEDICINE

## 2023-01-08 VITALS
TEMPERATURE: 98 F | OXYGEN SATURATION: 95 % | DIASTOLIC BLOOD PRESSURE: 88 MMHG | HEART RATE: 100 BPM | RESPIRATION RATE: 18 BRPM | SYSTOLIC BLOOD PRESSURE: 135 MMHG

## 2023-01-08 DIAGNOSIS — N50.811 RIGHT TESTICULAR PAIN: Primary | ICD-10-CM

## 2023-01-08 RX ORDER — KETOROLAC TROMETHAMINE 30 MG/ML
15 INJECTION, SOLUTION INTRAMUSCULAR; INTRAVENOUS ONCE
Status: COMPLETED | OUTPATIENT
Start: 2023-01-08 | End: 2023-01-08

## 2023-01-08 RX ADMIN — KETOROLAC TROMETHAMINE 15 MG: 30 INJECTION, SOLUTION INTRAMUSCULAR at 23:32

## 2023-01-09 LAB
BILIRUB UR QL STRIP: NEGATIVE
CLARITY UR: CLEAR
COLOR UR: YELLOW
GLUCOSE UR STRIP-MCNC: ABNORMAL MG/DL
HGB UR QL STRIP.AUTO: NEGATIVE
KETONES UR STRIP-MCNC: NEGATIVE MG/DL
LEUKOCYTE ESTERASE UR QL STRIP: NEGATIVE
NITRITE UR QL STRIP: NEGATIVE
PH UR STRIP.AUTO: 5.5 [PH]
PROT UR STRIP-MCNC: NEGATIVE MG/DL
SP GR UR STRIP.AUTO: 1.02 (ref 1–1.03)
UROBILINOGEN UR STRIP-ACNC: <2 MG/DL

## 2023-01-09 RX ORDER — IBUPROFEN 800 MG/1
800 TABLET ORAL 3 TIMES DAILY
Qty: 21 TABLET | Refills: 0 | Status: SHIPPED | OUTPATIENT
Start: 2023-01-09

## 2023-01-10 ENCOUNTER — HOSPITAL ENCOUNTER (OUTPATIENT)
Dept: MRI IMAGING | Facility: CLINIC | Age: 48
Discharge: HOME | End: 2023-01-10

## 2023-01-10 ENCOUNTER — TELEPHONE (OUTPATIENT)
Dept: UROLOGY | Facility: AMBULATORY SURGERY CENTER | Age: 48
End: 2023-01-10

## 2023-01-10 DIAGNOSIS — S49.92XD INJURY OF LEFT SHOULDER, SUBSEQUENT ENCOUNTER: ICD-10-CM

## 2023-01-10 NOTE — TELEPHONE ENCOUNTER
What is the reason for the patient’s appointment? NP- ER f/u [facility]    Patient can be reached at [phone]    What office location does the patient prefer? [place]     Any imaging done: in [facility]    Do we accept the patient's insurance or is the patient Self-Pay? [facility]    Has the patient had any previous Urologist(s)? No    Have patient records been requested? No    Has the patient had any outside testing done? No    Does the patient have a personal history of cancer?  No

## 2023-01-13 ENCOUNTER — TELEPHONE (OUTPATIENT)
Dept: SURGERY | Facility: CLINIC | Age: 48
End: 2023-01-13

## 2023-01-13 RX ORDER — PANCRELIPASE 60000; 12000; 38000 [USP'U]/1; [USP'U]/1; [USP'U]/1
CAPSULE, DELAYED RELEASE PELLETS ORAL
Qty: 90 CAPSULE | Refills: 0 | Status: SHIPPED | OUTPATIENT
Start: 2023-01-13

## 2023-01-13 NOTE — TELEPHONE ENCOUNTER
Rep: mani vega  Ref# [id number]  Active as of 3/6/2020 no future termination date  No referral req= ppo plan    [facility]

## 2023-01-16 ENCOUNTER — OFFICE VISIT (OUTPATIENT)
Dept: OBGYN CLINIC | Facility: CLINIC | Age: 48
End: 2023-01-16

## 2023-01-16 VITALS
HEART RATE: 76 BPM | DIASTOLIC BLOOD PRESSURE: 80 MMHG | SYSTOLIC BLOOD PRESSURE: 129 MMHG | HEIGHT: 71 IN | BODY MASS INDEX: 27.86 KG/M2 | WEIGHT: 199 LBS

## 2023-01-16 DIAGNOSIS — S46.212D BICEPS STRAIN, LEFT, SUBSEQUENT ENCOUNTER: ICD-10-CM

## 2023-01-16 DIAGNOSIS — S46.012D ROTATOR CUFF STRAIN, LEFT, SUBSEQUENT ENCOUNTER: Primary | ICD-10-CM

## 2023-01-16 DIAGNOSIS — S29.011D STRAIN OF LEFT PECTORALIS MUSCLE, SUBSEQUENT ENCOUNTER: ICD-10-CM

## 2023-01-16 DIAGNOSIS — M75.42 SUBACROMIAL IMPINGEMENT OF LEFT SHOULDER: ICD-10-CM

## 2023-01-16 RX ORDER — TRIAMCINOLONE ACETONIDE 40 MG/ML
40 INJECTION, SUSPENSION INTRA-ARTICULAR; INTRAMUSCULAR
Status: COMPLETED | OUTPATIENT
Start: 2023-01-16 | End: 2023-01-16

## 2023-01-16 RX ORDER — BUPIVACAINE HYDROCHLORIDE 2.5 MG/ML
4 INJECTION, SOLUTION INFILTRATION; PERINEURAL
Status: COMPLETED | OUTPATIENT
Start: 2023-01-16 | End: 2023-01-16

## 2023-01-16 RX ORDER — BUPIVACAINE HYDROCHLORIDE 2.5 MG/ML
1 INJECTION, SOLUTION INFILTRATION; PERINEURAL
Status: COMPLETED | OUTPATIENT
Start: 2023-01-16 | End: 2023-01-16

## 2023-01-16 RX ADMIN — TRIAMCINOLONE ACETONIDE 40 MG: 40 INJECTION, SUSPENSION INTRA-ARTICULAR; INTRAMUSCULAR at 09:10

## 2023-01-16 RX ADMIN — BUPIVACAINE HYDROCHLORIDE 1 ML: 2.5 INJECTION, SOLUTION INFILTRATION; PERINEURAL at 09:10

## 2023-01-16 RX ADMIN — BUPIVACAINE HYDROCHLORIDE 4 ML: 2.5 INJECTION, SOLUTION INFILTRATION; PERINEURAL at 09:10

## 2023-01-16 NOTE — LETTER
January 16, 2023     Patient: Manjinder Danielle  YOB: 1975  Date of Visit: 1/16/2023      To Whom it May Concern:    Manjinder Danielle is under my professional care  Kimberlee Sanders was seen in my office on 1/16/2023  Kimberlee Sanders is unable to return to work at this time  He will be re-evaluated in 5 weeks  If you have any questions or concerns, please don't hesitate to call           Sincerely,          Miley Cortes DO        CC: No Recipients

## 2023-01-17 ENCOUNTER — CONSULT (OUTPATIENT)
Dept: SURGERY | Facility: CLINIC | Age: 48
End: 2023-01-17

## 2023-01-17 VITALS
DIASTOLIC BLOOD PRESSURE: 64 MMHG | SYSTOLIC BLOOD PRESSURE: 116 MMHG | HEIGHT: 71 IN | HEART RATE: 75 BPM | RESPIRATION RATE: 16 BRPM | TEMPERATURE: 98 F | WEIGHT: 194 LBS | OXYGEN SATURATION: 93 % | BODY MASS INDEX: 27.16 KG/M2

## 2023-01-17 DIAGNOSIS — K40.20 NON-RECURRENT BILATERAL INGUINAL HERNIA WITHOUT OBSTRUCTION OR GANGRENE: Primary | ICD-10-CM

## 2023-01-17 DIAGNOSIS — R10.31 RIGHT GROIN PAIN: ICD-10-CM

## 2023-01-17 RX ORDER — HEPARIN SODIUM 5000 [USP'U]/ML
5000 INJECTION, SOLUTION INTRAVENOUS; SUBCUTANEOUS ONCE
OUTPATIENT
Start: 2023-01-17 | End: 2023-01-17

## 2023-01-17 RX ORDER — CHLORHEXIDINE GLUCONATE 4 G/100ML
SOLUTION TOPICAL DAILY PRN
OUTPATIENT
Start: 2023-01-17

## 2023-01-17 NOTE — H&P (VIEW-ONLY)
Assessment/Plan:  49-year-old male with bilateral reducible inguinal hernias  -Patient presents due to right groin pain, does not note any obvious bulge  -Has been tolerating a diet well and having normal bowel function  -On exam patient has reducible bilateral inguinal hernias  -BMP and CBC from 8/3/2022 reviewed  -CT scan of the abdomen and pelvis from 8/3/2022 report and images reviewed, this was also reviewed with the patient  -Ultrasound of the inguinal area from 12/16/2022 report reviewed  -Ultrasound of the scrotum and testicles from 1/8/2023 report reviewed  -Primary care physician note from 12/14/2022 reviewed  -Will plan for laparoscopic bilateral inguinal hernia repair with mesh, possible open  -CBC, CMP, PT/INR ordered  -EKG ordered  -Will request primary care physician risk stratification and optimization    A visual was used to display the anatomy and the proposed incision, procedure, steps, and mesh placement  The risks were explained at length and outlined, not limited to bleeding, infection, recurrence, pain, testicular loss, and damage to other structures  The planned approximately one hour procedure was discussed along with 1 - 2 week recovery, resolution of pain and swelling timeline, and 4 weeks of light duty without lifting  Questions were answered to satisfaction and consent was signed  Discussed with patient that his liver is large and protrudes downward into his abdominal cavity  Unsure if this will be in the way in regards to performing a laparoscopic inguinal hernia repair  Discussed that he may need an open repair pending laparoscopic evaluation  Patient voiced understanding  1  Non-recurrent bilateral inguinal hernia without obstruction or gangrene  -     Case request operating room: REPAIR HERNIA INGUINAL, LAPAROSCOPIC, Possible Open; Standing  -     CBC and differential; Future  -     Comprehensive metabolic panel; Future  -     Protime-INR; Future  -     EKG 12 lead;  Future  - Case request operating room: REPAIR HERNIA INGUINAL, LAPAROSCOPIC, Possible Open    2  Right groin pain  -     Ambulatory Referral to General Surgery  -     Case request operating room: REPAIR HERNIA INGUINAL, LAPAROSCOPIC, Possible Open; Standing  -     CBC and differential; Future  -     Comprehensive metabolic panel; Future  -     Protime-INR; Future  -     EKG 12 lead; Future  -     Case request operating room: REPAIR HERNIA INGUINAL, LAPAROSCOPIC, Possible Open               Subjective:      Patient ID: Fernanda Lesch is a 50 y o  male  Triage Notes:    Patient is a 40-year-old male who presents the office due to right groin pain  He states that a few weeks ago he noticed pain in his right groin  Is any obvious swelling or bulge  Notes that when he does lift something heavy he does notice pain in his right groin  He has been tolerating a diet well and having normal bowel function  The following portions of the patient's history were reviewed and updated as appropriate:   He  has a past medical history of Asthma, Chronic pain disorder, GERD (gastroesophageal reflux disease), Hyperlipidemia, Liver abscess, Meniscus tear, Pancreatitis, and Pneumonia    He   Patient Active Problem List    Diagnosis Date Noted   • Right groin pain 01/17/2023   • Non-recurrent bilateral inguinal hernia without obstruction or gangrene 01/17/2023   • Tremor, essential 08/16/2022   • Cervical spondylosis 07/21/2022   • Tremor of both outstretched hands 07/21/2022   • Liver abscess 05/19/2022   • Musculoskeletal neck pain 01/20/2021   • Change in bowel habits 03/27/2020   • Prediabetes 11/06/2019   • Abdominal pain, epigastric 10/11/2019   • Pancreatitis, unspecified pancreatitis type 08/02/2019   • Hypertension 04/16/2019   • Upper abdominal pain 04/11/2019   • Elevated blood pressure reading 02/28/2019   • Healthcare maintenance 11/21/2018   • GERD (gastroesophageal reflux disease) 09/11/2018   • Hyponatremia 09/11/2018 • Uncomplicated opioid dependence ([facility] ) 06/28/2018   • Long-term current use of opiate analgesic 06/28/2018   • Chronic gastritis without bleeding 05/22/2018   • Chronic pancreatitis ([facility] ) 05/11/2018   • Esophagitis 05/08/2018   • Chronic pain syndrome 05/02/2018   • Hypertriglyceridemia 04/19/2018   • Generalized abdominal pain 04/18/2018   • Abnormal transaminases 04/18/2018   • Acute on chronic pancreatitis ([place] [address] ) 04/18/2018   • Leukopenia 04/18/2018   • Leukocytosis 11/16/2017   • RBBB 11/16/2017   • Epigastric pain 11/15/2017   • Renal mass 03/02/2017   • Encounter for smoking cessation counseling 03/02/2017   • Pancreatic lesion 02/14/2017     He  has a past surgical history that includes Rotator cuff repair (Right); Pancreas surgery; Cholecystectomy; pr laparoscopy surg cholecystectomy (N/A, 2/14/2017); Liver surgery; pr injx anes celiac plexus w/wo radiologic monitrng (Bilateral, 5/9/2017); pr injx anes celiac plexus w/wo radiologic monitrng (Bilateral, 6/1/2017); pr injx anes celiac plexus w/wo radiologic monitrng (Bilateral, 8/8/2017); Esophagogastroduodenoscopy (N/A, 11/16/2017); pr injection anes lmbr/thrc paravertbrl sympathetic (Bilateral, 12/28/2017); Knee arthroscopy (Bilateral); Shoulder arthroscopy; Shoulder arthroscopy (Right); Esophagogastroduodenoscopy (N/A, 4/19/2018); Esophagogastroduodenoscopy (N/A, 5/10/2018); Knee arthroscopy (Right, 2009); NERVE BLOCK (Bilateral, 5/29/2018); Celiac plexus block (Bilateral, 10/29/2018); Celiac plexus block (Bilateral, 1/10/2019); IR temporary dialysis catheter placement (2/28/2019); pr injx anes celiac plexus w/wo radiologic monitrng (Bilateral, 4/18/2019); Pancreatic cyst excision; Knee arthroscopy (Right, 07/01/2019); pr injx anes celiac plexus w/wo radiologic monitrng (Bilateral, 8/20/2019); and pr injx anes celiac plexus w/wo radiologic monitrng (Bilateral, 10/17/2019)    His family history includes Cancer in his other; Cirrhosis in his mother; Heart disease in his other  He  reports that he quit smoking about 22 months ago  His smoking use included cigarettes  He has a 20 00 pack-year smoking history  He has never used smokeless tobacco  He reports current alcohol use of about 10 0 standard drinks per week  He reports that he does not use drugs    Current Outpatient Medications on File Prior to Visit   Medication Sig   • albuterol (PROVENTIL HFA,VENTOLIN HFA) 90 mcg/act inhaler INHALE TWO PUFFS BY MOUTH EVERY 6 HOURS AS NEEDED FOR WHEEZING   • amLODIPine (NORVASC) 5 mg tablet TAKE ONE TABLET BY MOUTH EVERY DAY   • Choline Fenofibrate (Fenofibric Acid) 135 MG CPDR TAKE ONE CAPSULE BY MOUTH EVERY DAY   • Choline Fenofibrate (Fenofibric Acid) 135 MG CPDR Take 1 capsule (135 mg total) by mouth daily   • Creon 83570-50377 units capsule take 1 capsule by mouth up to 3 times per day as needed for snacks   • Empagliflozin 10 MG TABS Take 10 mg by mouth every morning   • esomeprazole (NexIUM) 40 MG capsule Take 1 capsule (40 mg total) by mouth 2 (two) times a day before meals   • famotidine (PEPCID) 40 MG tablet Take 40 mg by mouth daily at bedtime as needed    • ibuprofen (MOTRIN) 800 mg tablet Take 1 tablet (800 mg total) by mouth 3 (three) times a day   • insulin degludec Reji Constant FlexTouch) 100 units/mL injection pen 20 units subcut in hs   • Insulin Pen Needle (Pen Needles) 32G X 5 MM MISC use once daily as directed   • lisinopril (ZESTRIL) 20 mg tablet Take 1 tablet (20 mg total) by mouth daily   • omega-3-acid ethyl esters (LOVAZA) 1 g capsule Take 2 capsules (2 g total) by mouth 2 (two) times a day   • ondansetron (ZOFRAN-ODT) 4 mg disintegrating tablet Take 1 tablet (4 mg total) by mouth every 8 (eight) hours   • Pancrelipase, Lip-Prot-Amyl, (Creon) 10669-112934 units CPEP Take 1 capsule (36,000 Units total) by mouth 3 (three) times a day before meals   • propranolol (INDERAL) 20 mg tablet TAKE 1 TABLET BY MOUTH TWICE DAILY   • rosuvastatin (CRESTOR) 40 MG tablet TAKE ONE TABLET BY MOUTH EVERY DAY   • traZODone (DESYREL) 50 mg tablet Take 1 tablet (50 mg total) by mouth daily at bedtime   • Diclofenac Sodium (VOLTAREN) 1 % Apply 2 g topically 4 (four) times a day   • ergocalciferol (ERGOCALCIFEROL) 1 25 MG (86617 UT) capsule Take 50,000 Units by mouth (Patient not taking: Reported on 1/17/2023)     No current facility-administered medications on file prior to visit  He is allergic to bee venom       Review of Systems   Constitutional: Negative for chills, fatigue and fever  HENT: Negative for congestion, hearing loss, rhinorrhea and sore throat  Eyes: Negative for pain and discharge  Respiratory: Negative for cough, chest tightness and shortness of breath  Cardiovascular: Negative for chest pain and palpitations  Gastrointestinal: Negative for abdominal pain, constipation, diarrhea, nausea and vomiting  Positive right groin pain   Endocrine: Negative for cold intolerance and heat intolerance  Genitourinary: Negative for difficulty urinating and dysuria  Musculoskeletal: Negative for back pain and neck pain  Skin: Negative for color change and rash  Allergic/Immunologic: Negative for environmental allergies and food allergies  Neurological: Negative for seizures and headaches  Hematological: Negative for adenopathy  Does not bruise/bleed easily  Psychiatric/Behavioral: Negative for confusion and hallucinations  Objective:      /64   Pulse 75   Temp 98 °F (36 7 °C) (Temporal)   Resp 16   Ht 5' 11" (1 803 m)   Wt 88 kg (194 lb)   SpO2 93%   BMI 27 06 kg/m²     Below is the patient's most recent value for Albumin, ALT, AST, BUN, Calcium, Chloride, Cholesterol, CO2, Creatinine, GFR, Glucose, HDL, Hematocrit, Hemoglobin, Hemoglobin A1C, LDL, Magnesium, Phosphorus, Platelets, Potassium, PSA, Sodium, Triglycerides, and WBC     Lab Results   Component Value Date    ALT 58 (H) 06/16/2022    AST 52 (H) 06/16/2022    BUN 26 (H) 08/03/2022    CALCIUM 8 6 08/03/2022     08/03/2022    CHOL 114 (L) 04/26/2017    CO2 21 08/03/2022    CREATININE 0 85 08/03/2022    HDL 23 (L) 09/14/2021    HCT 38 0 (L) 12/16/2022    HGB 12 8 (L) 12/16/2022    HGBA1C 5 4 08/24/2021    MG 1 9 05/14/2020    PHOS 4 1 04/14/2019    PLT 99 (L) 12/16/2022    K 3 9 08/03/2022     04/26/2017    TRIG 672 (H) 09/14/2021    WBC 5 9 12/16/2022     Note: for a comprehensive list of the patient's lab results, access the Results Review activity  Physical Exam  Constitutional:       Appearance: Normal appearance  HENT:      Head: Normocephalic and atraumatic  Nose: Nose normal    Eyes:      General: No scleral icterus  Conjunctiva/sclera: Conjunctivae normal    Cardiovascular:      Rate and Rhythm: Normal rate and regular rhythm  Heart sounds: Normal heart sounds  Pulmonary:      Effort: Pulmonary effort is normal       Breath sounds: Normal breath sounds  Abdominal:      General: There is no distension  Palpations: Abdomen is soft  Tenderness: There is no abdominal tenderness  Comments: Small reducible umbilical hernia, bilateral reducible inguinal hernias   Musculoskeletal:         General: No signs of injury  Skin:     General: Skin is warm  Coloration: Skin is not jaundiced  Neurological:      General: No focal deficit present  Mental Status: He is alert and oriented to person, place, and time     Psychiatric:         Mood and Affect: Mood normal          Behavior: Behavior normal

## 2023-01-17 NOTE — PROGRESS NOTES
Assessment/Plan:  53-year-old male with bilateral reducible inguinal hernias  -Patient presents due to right groin pain, does not note any obvious bulge  -Has been tolerating a diet well and having normal bowel function  -On exam patient has reducible bilateral inguinal hernias  -BMP and CBC from 8/3/2022 reviewed  -CT scan of the abdomen and pelvis from 8/3/2022 report and images reviewed, this was also reviewed with the patient  -Ultrasound of the inguinal area from 12/16/2022 report reviewed  -Ultrasound of the scrotum and testicles from 1/8/2023 report reviewed  -Primary care physician note from 12/14/2022 reviewed  -Will plan for laparoscopic bilateral inguinal hernia repair with mesh, possible open  -CBC, CMP, PT/INR ordered  -EKG ordered  -Will request primary care physician risk stratification and optimization    A visual was used to display the anatomy and the proposed incision, procedure, steps, and mesh placement  The risks were explained at length and outlined, not limited to bleeding, infection, recurrence, pain, testicular loss, and damage to other structures  The planned approximately one hour procedure was discussed along with 1 - 2 week recovery, resolution of pain and swelling timeline, and 4 weeks of light duty without lifting  Questions were answered to satisfaction and consent was signed  Discussed with patient that his liver is large and protrudes downward into his abdominal cavity  Unsure if this will be in the way in regards to performing a laparoscopic inguinal hernia repair  Discussed that he may need an open repair pending laparoscopic evaluation  Patient voiced understanding  1  Non-recurrent bilateral inguinal hernia without obstruction or gangrene  -     Case request operating room: REPAIR HERNIA INGUINAL, LAPAROSCOPIC, Possible Open; Standing  -     CBC and differential; Future  -     Comprehensive metabolic panel; Future  -     Protime-INR; Future  -     EKG 12 lead;  Future  - Case request operating room: REPAIR HERNIA INGUINAL, LAPAROSCOPIC, Possible Open    2  Right groin pain  -     Ambulatory Referral to General Surgery  -     Case request operating room: REPAIR HERNIA INGUINAL, LAPAROSCOPIC, Possible Open; Standing  -     CBC and differential; Future  -     Comprehensive metabolic panel; Future  -     Protime-INR; Future  -     EKG 12 lead; Future  -     Case request operating room: REPAIR HERNIA INGUINAL, LAPAROSCOPIC, Possible Open               Subjective:      Patient ID: Ellen Dooley is a 50 y o  male  Triage Notes:    Patient is a 28-year-old male who presents the office due to right groin pain  He states that a few weeks ago he noticed pain in his right groin  Is any obvious swelling or bulge  Notes that when he does lift something heavy he does notice pain in his right groin  He has been tolerating a diet well and having normal bowel function  The following portions of the patient's history were reviewed and updated as appropriate:   He  has a past medical history of Asthma, Chronic pain disorder, GERD (gastroesophageal reflux disease), Hyperlipidemia, Liver abscess, Meniscus tear, Pancreatitis, and Pneumonia    He   Patient Active Problem List    Diagnosis Date Noted   • Right groin pain 01/17/2023   • Non-recurrent bilateral inguinal hernia without obstruction or gangrene 01/17/2023   • Tremor, essential 08/16/2022   • Cervical spondylosis 07/21/2022   • Tremor of both outstretched hands 07/21/2022   • Liver abscess 05/19/2022   • Musculoskeletal neck pain 01/20/2021   • Change in bowel habits 03/27/2020   • Prediabetes 11/06/2019   • Abdominal pain, epigastric 10/11/2019   • Pancreatitis, unspecified pancreatitis type 08/02/2019   • Hypertension 04/16/2019   • Upper abdominal pain 04/11/2019   • Elevated blood pressure reading 02/28/2019   • Healthcare maintenance 11/21/2018   • GERD (gastroesophageal reflux disease) 09/11/2018   • Hyponatremia 09/11/2018 • Uncomplicated opioid dependence ([facility] ) 06/28/2018   • Long-term current use of opiate analgesic 06/28/2018   • Chronic gastritis without bleeding 05/22/2018   • Chronic pancreatitis ([facility] ) 05/11/2018   • Esophagitis 05/08/2018   • Chronic pain syndrome 05/02/2018   • Hypertriglyceridemia 04/19/2018   • Generalized abdominal pain 04/18/2018   • Abnormal transaminases 04/18/2018   • Acute on chronic pancreatitis ([place] [address] ) 04/18/2018   • Leukopenia 04/18/2018   • Leukocytosis 11/16/2017   • RBBB 11/16/2017   • Epigastric pain 11/15/2017   • Renal mass 03/02/2017   • Encounter for smoking cessation counseling 03/02/2017   • Pancreatic lesion 02/14/2017     He  has a past surgical history that includes Rotator cuff repair (Right); Pancreas surgery; Cholecystectomy; pr laparoscopy surg cholecystectomy (N/A, 2/14/2017); Liver surgery; pr injx anes celiac plexus w/wo radiologic monitrng (Bilateral, 5/9/2017); pr injx anes celiac plexus w/wo radiologic monitrng (Bilateral, 6/1/2017); pr injx anes celiac plexus w/wo radiologic monitrng (Bilateral, 8/8/2017); Esophagogastroduodenoscopy (N/A, 11/16/2017); pr injection anes lmbr/thrc paravertbrl sympathetic (Bilateral, 12/28/2017); Knee arthroscopy (Bilateral); Shoulder arthroscopy; Shoulder arthroscopy (Right); Esophagogastroduodenoscopy (N/A, 4/19/2018); Esophagogastroduodenoscopy (N/A, 5/10/2018); Knee arthroscopy (Right, 2009); NERVE BLOCK (Bilateral, 5/29/2018); Celiac plexus block (Bilateral, 10/29/2018); Celiac plexus block (Bilateral, 1/10/2019); IR temporary dialysis catheter placement (2/28/2019); pr injx anes celiac plexus w/wo radiologic monitrng (Bilateral, 4/18/2019); Pancreatic cyst excision; Knee arthroscopy (Right, 07/01/2019); pr injx anes celiac plexus w/wo radiologic monitrng (Bilateral, 8/20/2019); and pr injx anes celiac plexus w/wo radiologic monitrng (Bilateral, 10/17/2019)    His family history includes Cancer in his other; Cirrhosis in his mother; Heart disease in his other  He  reports that he quit smoking about 22 months ago  His smoking use included cigarettes  He has a 20 00 pack-year smoking history  He has never used smokeless tobacco  He reports current alcohol use of about 10 0 standard drinks per week  He reports that he does not use drugs    Current Outpatient Medications on File Prior to Visit   Medication Sig   • albuterol (PROVENTIL HFA,VENTOLIN HFA) 90 mcg/act inhaler INHALE TWO PUFFS BY MOUTH EVERY 6 HOURS AS NEEDED FOR WHEEZING   • amLODIPine (NORVASC) 5 mg tablet TAKE ONE TABLET BY MOUTH EVERY DAY   • Choline Fenofibrate (Fenofibric Acid) 135 MG CPDR TAKE ONE CAPSULE BY MOUTH EVERY DAY   • Choline Fenofibrate (Fenofibric Acid) 135 MG CPDR Take 1 capsule (135 mg total) by mouth daily   • Creon 13349-16029 units capsule take 1 capsule by mouth up to 3 times per day as needed for snacks   • Empagliflozin 10 MG TABS Take 10 mg by mouth every morning   • esomeprazole (NexIUM) 40 MG capsule Take 1 capsule (40 mg total) by mouth 2 (two) times a day before meals   • famotidine (PEPCID) 40 MG tablet Take 40 mg by mouth daily at bedtime as needed    • ibuprofen (MOTRIN) 800 mg tablet Take 1 tablet (800 mg total) by mouth 3 (three) times a day   • insulin degludec [place] FlexTouch) 100 units/mL injection pen 20 units subcut in hs   • Insulin Pen Needle (Pen Needles) 32G X 5 MM MISC use once daily as directed   • lisinopril (ZESTRIL) 20 mg tablet Take 1 tablet (20 mg total) by mouth daily   • omega-3-acid ethyl esters (LOVAZA) 1 g capsule Take 2 capsules (2 g total) by mouth 2 (two) times a day   • ondansetron (ZOFRAN-ODT) 4 mg disintegrating tablet Take 1 tablet (4 mg total) by mouth every 8 (eight) hours   • Pancrelipase, Lip-Prot-Amyl, (Creon) 40152-062048 units CPEP Take 1 capsule (36,000 Units total) by mouth 3 (three) times a day before meals   • propranolol (INDERAL) 20 mg tablet TAKE 1 TABLET BY MOUTH TWICE DAILY   • rosuvastatin (CRESTOR) 40 MG tablet TAKE ONE TABLET BY MOUTH EVERY DAY   • traZODone (DESYREL) 50 mg tablet Take 1 tablet (50 mg total) by mouth daily at bedtime   • Diclofenac Sodium (VOLTAREN) 1 % Apply 2 g topically 4 (four) times a day   • ergocalciferol (ERGOCALCIFEROL) 1 25 MG (94141 UT) capsule Take 50,000 Units by mouth (Patient not taking: Reported on 1/17/2023)     No current facility-administered medications on file prior to visit  He is allergic to bee venom       Review of Systems   Constitutional: Negative for chills, fatigue and fever  HENT: Negative for congestion, hearing loss, rhinorrhea and sore throat  Eyes: Negative for pain and discharge  Respiratory: Negative for cough, chest tightness and shortness of breath  Cardiovascular: Negative for chest pain and palpitations  Gastrointestinal: Negative for abdominal pain, constipation, diarrhea, nausea and vomiting  Positive right groin pain   Endocrine: Negative for cold intolerance and heat intolerance  Genitourinary: Negative for difficulty urinating and dysuria  Musculoskeletal: Negative for back pain and neck pain  Skin: Negative for color change and rash  Allergic/Immunologic: Negative for environmental allergies and food allergies  Neurological: Negative for seizures and headaches  Hematological: Negative for adenopathy  Does not bruise/bleed easily  Psychiatric/Behavioral: Negative for confusion and hallucinations  Objective:      /64   Pulse 75   Temp 98 °F (36 7 °C) (Temporal)   Resp 16   Ht 5' 11" (1 803 m)   Wt 88 kg (194 lb)   SpO2 93%   BMI 27 06 kg/m²     Below is the patient's most recent value for Albumin, ALT, AST, BUN, Calcium, Chloride, Cholesterol, CO2, Creatinine, GFR, Glucose, HDL, Hematocrit, Hemoglobin, Hemoglobin A1C, LDL, Magnesium, Phosphorus, Platelets, Potassium, PSA, Sodium, Triglycerides, and WBC     Lab Results   Component Value Date    ALT 58 (H) 06/16/2022    AST 52 (H) 06/16/2022    BUN 26 (H) 08/03/2022    CALCIUM 8 6 08/03/2022     08/03/2022    CHOL 114 (L) 04/26/2017    CO2 21 08/03/2022    CREATININE 0 85 08/03/2022    HDL 23 (L) 09/14/2021    HCT 38 0 (L) 12/16/2022    HGB 12 8 (L) 12/16/2022    HGBA1C 5 4 08/24/2021    MG 1 9 05/14/2020    PHOS 4 1 04/14/2019    PLT 99 (L) 12/16/2022    K 3 9 08/03/2022     04/26/2017    TRIG 672 (H) 09/14/2021    WBC 5 9 12/16/2022     Note: for a comprehensive list of the patient's lab results, access the Results Review activity  Physical Exam  Constitutional:       Appearance: Normal appearance  HENT:      Head: Normocephalic and atraumatic  Nose: Nose normal    Eyes:      General: No scleral icterus  Conjunctiva/sclera: Conjunctivae normal    Cardiovascular:      Rate and Rhythm: Normal rate and regular rhythm  Heart sounds: Normal heart sounds  Pulmonary:      Effort: Pulmonary effort is normal       Breath sounds: Normal breath sounds  Abdominal:      General: There is no distension  Palpations: Abdomen is soft  Tenderness: There is no abdominal tenderness  Comments: Small reducible umbilical hernia, bilateral reducible inguinal hernias   Musculoskeletal:         General: No signs of injury  Skin:     General: Skin is warm  Coloration: Skin is not jaundiced  Neurological:      General: No focal deficit present  Mental Status: He is alert and oriented to person, place, and time     Psychiatric:         Mood and Affect: Mood normal          Behavior: Behavior normal

## 2023-01-18 ENCOUNTER — RA CDI HCC (OUTPATIENT)
Dept: OTHER | Facility: HOSPITAL | Age: 48
End: 2023-01-18

## 2023-01-18 ENCOUNTER — TELEPHONE (OUTPATIENT)
Dept: FAMILY MEDICINE CLINIC | Facility: CLINIC | Age: 48
End: 2023-01-18

## 2023-01-18 NOTE — PROGRESS NOTES
[facility]  coding opportunities       Chart reviewed, no opportunity found: CHART REVIEWED, NO OPPORTUNITY FOUND        Patients Insurance        Commercial Insurance: [employer]

## 2023-01-18 NOTE — TELEPHONE ENCOUNTER
Patient called stating he now has a surgery with Dr Luz Maria Roberson in February 2/3/2023  He states he should not need a pre-op appointment since he saw you on 12/14/2022  I explained to him what Pre-Op appointments are and he states he shouldn't need on, he is "pretty cleared" from you from December  Patient requested I ask you if he needs a pre op or not?

## 2023-01-20 NOTE — ED PROVIDER NOTES
History  Chief Complaint   Patient presents with   • Testicle Pain     R testicle pain, known hernia, did something today that made it worse     43-year-old male presented to the emergency evaluation of groin pain  Patient has right testicle/groin pain, states that he has a hernia there, feels that he did some intermittent worse  Has not pain in the right groin area  Denies any testicle pain or tenderness  Denies any penile discharge or dysuria  Prior to Admission Medications   Prescriptions Last Dose Informant Patient Reported? Taking?    Choline Fenofibrate (Fenofibric Acid) 135 MG CPDR   No No   Sig: TAKE ONE CAPSULE BY MOUTH EVERY DAY   Choline Fenofibrate (Fenofibric Acid) 135 MG CPDR   No No   Sig: Take 1 capsule (135 mg total) by mouth daily   Diclofenac Sodium (VOLTAREN) 1 %   No No   Sig: Apply 2 g topically 4 (four) times a day   Empagliflozin 10 MG TABS   Yes No   Sig: Take 10 mg by mouth every morning   Insulin Pen Needle (Pen Needles) 32G X 5 MM MISC   Yes No   Sig: use once daily as directed   Pancrelipase, Lip-Prot-Amyl, (Creon) 07405-962155 units CPEP   No No   Sig: Take 1 capsule (36,000 Units total) by mouth 3 (three) times a day before meals   albuterol (PROVENTIL HFA,VENTOLIN HFA) 90 mcg/act inhaler   No No   Sig: INHALE TWO PUFFS BY MOUTH EVERY 6 HOURS AS NEEDED FOR WHEEZING   amLODIPine (NORVASC) 5 mg tablet   No No   Sig: TAKE ONE TABLET BY MOUTH EVERY DAY   ergocalciferol (ERGOCALCIFEROL) 1 25 MG (59034 UT) capsule   Yes No   Sig: Take 50,000 Units by mouth   Patient not taking: Reported on 2023   esomeprazole (NexIUM) 40 MG capsule   No No   Sig: Take 1 capsule (40 mg total) by mouth 2 (two) times a day before meals   famotidine (PEPCID) 40 MG tablet   Yes No   Sig: Take 40 mg by mouth daily at bedtime as needed    insulin degludec Crissie Crate FlexTouch) 100 units/mL injection pen   Yes No   Si units subcut in hs   lisinopril (ZESTRIL) 20 mg tablet   No No   Sig: Take 1 tablet (20 mg total) by mouth daily   omega-3-acid ethyl esters (LOVAZA) 1 g capsule   No No   Sig: Take 2 capsules (2 g total) by mouth 2 (two) times a day   ondansetron (ZOFRAN-ODT) 4 mg disintegrating tablet   No No   Sig: Take 1 tablet (4 mg total) by mouth every 8 (eight) hours   propranolol (INDERAL) 20 mg tablet   No No   Sig: TAKE 1 TABLET BY MOUTH TWICE DAILY   rosuvastatin (CRESTOR) 40 MG tablet   No No   Sig: TAKE ONE TABLET BY MOUTH EVERY DAY   traZODone (DESYREL) 50 mg tablet   No No   Sig: Take 1 tablet (50 mg total) by mouth daily at bedtime      Facility-Administered Medications: None       Past Medical History:   Diagnosis Date   • Asthma    • Chronic pain disorder    • GERD (gastroesophageal reflux disease)    • Hyperlipidemia    • Liver abscess    • Meniscus tear     left knee work injury last assessed 08/24/2016   • Pancreatitis    • Pneumonia        Past Surgical History:   Procedure Laterality Date   • CELIAC PLEXUS BLOCK Bilateral 10/29/2018    Procedure: SPLANCHNIC NERVE BLOCK;  Surgeon: Ana Laura Evans MD;  [facility];  Service: Pain Management    • CELIAC PLEXUS BLOCK Bilateral 1/10/2019    Procedure: SPLANCHNIC NERVE BLOCK;  Surgeon: Ana Laura Evans MD;  [facility];  Service: Pain Management    • CHOLECYSTECTOMY     • ESOPHAGOGASTRODUODENOSCOPY N/A 11/16/2017    Procedure: ESOPHAGOGASTRODUODENOSCOPY (EGD); Surgeon: Shasta Weiner MD;  [facility]; Service: Gastroenterology   • ESOPHAGOGASTRODUODENOSCOPY N/A 4/19/2018    Procedure: ESOPHAGOGASTRODUODENOSCOPY (EGD); Surgeon: Jolanta Staton MD;  [facility]; Service: Gastroenterology   • ESOPHAGOGASTRODUODENOSCOPY N/A 5/10/2018    Procedure: ESOPHAGOGASTRODUODENOSCOPY (EGD); Surgeon: Susan Bennett MD;  [facility];   Service: Gastroenterology   • IR TEMPORARY DIALYSIS CATHETER PLACEMENT  2/28/2019   • KNEE ARTHROSCOPY Bilateral    • KNEE ARTHROSCOPY Right 2009    edgardoishimaria elena last assessed 08/24/2016   • KNEE ARTHROSCOPY Right 07/01/2019   • LIVER SURGERY     • NERVE BLOCK Bilateral 5/29/2018    Procedure: SPLANCHNIC NERVE BLOCK;  Surgeon: Norma Ferraro MD;  [facility];  Service: Pain Management    • PANCREAS SURGERY      stents   • PANCREATIC CYST EXCISION     • MD INJECTION ANES LMBR/THRC PARAVERTBRL SYMPATHETIC Bilateral 12/28/2017    Procedure: SPLANCHNIC NERVE BLOCK;  Surgeon: Norma Ferraro MD;  [facility];  Service: Pain Management    •  [facility] W/WO RADIOLOGIC MONITRNG Bilateral 5/9/2017    Procedure: CELIAC PLEXUS BLOCK ;  Surgeon: Norma Ferraro MD;  [facility];  Service: Pain Management    •  [facility] W/WO RADIOLOGIC MONITRNG Bilateral 6/1/2017    Procedure: SPLANCHNIC NERVE BLOCK at T12;  Surgeon: Norma Ferraro MD;  [facility];  Service: Pain Management    •  [facility] W/WO RADIOLOGIC MONITRNG Bilateral 8/8/2017    Procedure: BILATERAL SPLANCHNIC NERVE BLOCK T12;  Surgeon: Norma Ferraro MD;  [facility];  Service: Pain Management    •  [facility] W/WO RADIOLOGIC MONITRNG Bilateral 4/18/2019    Procedure: BLOCK / INJECTION CELIAC PLEXUS;  Surgeon: Norma Ferraro MD;  [facility];  Service: Pain Management    • MD INJX ANES CELIAC PLEXUS W/WO RADIOLOGIC MONITRNG Bilateral 8/20/2019    Procedure: SPLANCHNIC NERVE BLOCK;  Surgeon: Norma Ferraro MD;  [facility];  Service: Pain Management    •  [facility] W/WO RADIOLOGIC MONITRNG Bilateral 10/17/2019    Procedure: SPLANCHNIC NERVE BLOCK;  Surgeon: Noram Ferraro MD;  [facility];  Service: Pain Management    • MD LAPAROSCOPY SURG CHOLECYSTECTOMY N/A 2/14/2017    Procedure: LAPAROSCOPIC CHOLECYSTECTOMY, IOC, POSSIBLE OPEN ;  Surgeon: Chiara Ruiz MD;  [facility];  Service: General   • ROTATOR CUFF REPAIR Right    • SHOULDER ARTHROSCOPY     • SHOULDER ARTHROSCOPY Right Family History   Problem Relation Age of Onset   • Cirrhosis Mother    • Heart disease Other         cardiac disorder   • Cancer Other      I have reviewed and agree with the history as documented  E-Cigarette/Vaping   • E-Cigarette Use Never User      E-Cigarette/Vaping Substances   • Nicotine Yes    • THC No    • CBD No    • Flavoring No    • Other No    • Unknown No      Social History     Tobacco Use   • Smoking status: Former     Packs/day: 1 00     Years: 20 00     Pack years: 20 00     Types: Cigarettes     Quit date: 3/15/2021     Years since quittin 8   • Smokeless tobacco: Never   Vaping Use   • Vaping Use: Never used   Substance Use Topics   • Alcohol use: Yes     Alcohol/week: 10 0 standard drinks     Types: 10 Cans of beer per week     Comment: on weekends   • Drug use: No       Review of Systems   Constitutional: Negative for appetite change, chills, fatigue and fever  HENT: Negative for sneezing and sore throat  Eyes: Negative for visual disturbance  Respiratory: Negative for cough, choking, chest tightness, shortness of breath and wheezing  Cardiovascular: Negative for chest pain and palpitations  Gastrointestinal: Negative for abdominal pain, constipation, diarrhea, nausea and vomiting  Genitourinary: Positive for testicular pain  Negative for difficulty urinating and dysuria  Neurological: Negative for dizziness, weakness, light-headedness, numbness and headaches  All other systems reviewed and are negative  Physical Exam  Physical Exam  Vitals and nursing note reviewed  Exam conducted with a chaperone present  Constitutional:       General: He is not in acute distress  Appearance: He is well-developed  He is not diaphoretic  HENT:      Head: Normocephalic and atraumatic  Eyes:      Pupils: Pupils are equal, round, and reactive to light  Neck:      Vascular: No JVD  Trachea: No tracheal deviation     Cardiovascular:      Rate and Rhythm: Normal rate and regular rhythm  Heart sounds: Normal heart sounds  No murmur heard  No friction rub  No gallop  Pulmonary:      Effort: Pulmonary effort is normal  No respiratory distress  Breath sounds: Normal breath sounds  No wheezing or rales  Abdominal:      General: Bowel sounds are normal  There is no distension  Palpations: Abdomen is soft  Tenderness: There is no abdominal tenderness  There is no guarding or rebound  Genitourinary:     Comments: There is tenderness to the right posterior testicle  Skin:     General: Skin is warm and dry  Coloration: Skin is not pale  Neurological:      Mental Status: He is alert and oriented to person, place, and time  Cranial Nerves: No cranial nerve deficit  Motor: No abnormal muscle tone     Psychiatric:         Behavior: Behavior normal          Vital Signs  ED Triage Vitals   Temperature Pulse Respirations Blood Pressure SpO2   01/08/23 2127 01/08/23 2127 01/08/23 2127 01/08/23 2127 01/08/23 2127   98 °F (36 7 °C) 100 18 135/88 95 %      Temp src Heart Rate Source Patient Position - Orthostatic VS BP Location FiO2 (%)   -- -- -- -- --             Pain Score       01/08/23 2332       9           Vitals:    01/08/23 2127   BP: 135/88   Pulse: 100         Visual Acuity      ED Medications  Medications   ketorolac (TORADOL) injection 15 mg (15 mg Intramuscular Given 1/8/23 2332)       Diagnostic Studies  Results Reviewed     Procedure Component Value Units Date/Time    UA w Reflex to Microscopic w Reflex to Culture [[id number]]  (Abnormal) Collected: 01/09/23 0124    Lab Status: Final result Specimen: Urine, Other Updated: 01/09/23 0136     Color, UA Yellow     Clarity, UA Clear     Specific Gravity, UA 1 017     pH, UA 5 5     Leukocytes, UA Negative     Nitrite, UA Negative     Protein, UA Negative mg/dl      Glucose,  (3/10%) mg/dl      Ketones, UA Negative mg/dl      Urobilinogen, UA <2 0 mg/dl      Bilirubin, UA Negative Occult Blood, UA Negative                 US scrotum and testicles   Final Result by Radha Rdz DO (01/09 0003)       No evidence of testicular torsion  Small fat-containing right inguinal hernia  Workstation performed: [id number]                    Procedures  Procedures         ED Course                                             Medical Decision Making  78-year-old male with right testicle pain, will give NSAID, check ultrasound, reassess  Right testicular pain: complicated acute illness or injury  Amount and/or Complexity of Data Reviewed  Radiology: ordered  Risk  Prescription drug management  Disposition  Final diagnoses:   Right testicular pain     Time reflects when diagnosis was documented in both MDM as applicable and the Disposition within this note     Time User Action Codes Description Comment    1/9/2023  1:55 AM Doug Busing Add [N50 811] Right testicular pain       ED Disposition     ED Disposition   Discharge    Condition   Stable    Date/Time   Mon Jan 9, 2023  1:54 AM    Comment   Sandie Oliveira discharge to home/self care                 Follow-up Information     Follow up With Specialties Details Why Contact Info Additional Information    Malik Charter, [address] Satya Lanza, Nurse Practitioner   [address]  [address] [place] [address]  [phone]       [facility] Urology   [address]  [facility] [place] [address]  [phone] [facility], [address] [place] [address]  [address] [facility], [address], [facility], [place], [address]          Discharge Medication List as of 1/9/2023  1:55 AM      START taking these medications    Details   ibuprofen (MOTRIN) 800 mg tablet Take 1 tablet (800 mg total) by mouth 3 (three) times a day, Starting Mon 1/9/2023, Normal         CONTINUE these medications which have NOT CHANGED    Details   albuterol (PROVENTIL HFA,VENTOLIN HFA) 90 mcg/act inhaler INHALE TWO PUFFS BY MOUTH EVERY 6 HOURS AS NEEDED FOR WHEEZING, Normal      amLODIPine (NORVASC) 5 mg tablet TAKE ONE TABLET BY MOUTH EVERY DAY, Normal      !! Choline Fenofibrate (Fenofibric Acid) 135 MG CPDR TAKE ONE CAPSULE BY MOUTH EVERY DAY, Normal      !!  Choline Fenofibrate (Fenofibric Acid) 135 MG CPDR Take 1 capsule (135 mg total) by mouth daily, Starting Thu 11/10/2022, Normal      Empagliflozin 10 MG TABS Take 10 mg by mouth every morning, Historical Med      esomeprazole (NexIUM) 40 MG capsule Take 1 capsule (40 mg total) by mouth 2 (two) times a day before meals, Starting Thu 10/20/2022, Normal      famotidine (PEPCID) 40 MG tablet Take 40 mg by mouth daily at bedtime as needed , Historical Med      insulin degludec Dori Ouch FlexTouch) 100 units/mL injection pen 20 units subcut in hs, Historical Med      Insulin Pen Needle (Pen Needles) 32G X 5 MM MISC use once daily as directed, Historical Med      lisinopril (ZESTRIL) 20 mg tablet Take 1 tablet (20 mg total) by mouth daily, Starting Wed 7/20/2022, Normal      omega-3-acid ethyl esters (LOVAZA) 1 g capsule Take 2 capsules (2 g total) by mouth 2 (two) times a day, Starting Thu 11/10/2022, Normal      ondansetron (ZOFRAN-ODT) 4 mg disintegrating tablet Take 1 tablet (4 mg total) by mouth every 8 (eight) hours, Starting Thu 11/10/2022, Normal      Pancrelipase, Lip-Prot-Amyl, (Creon) 09528-583405 units CPEP Take 1 capsule (36,000 Units total) by mouth 3 (three) times a day before meals, Starting Thu 11/10/2022, Normal      propranolol (INDERAL) 20 mg tablet TAKE 1 TABLET BY MOUTH TWICE DAILY, Normal      rosuvastatin (CRESTOR) 40 MG tablet TAKE ONE TABLET BY MOUTH EVERY DAY, Normal      traZODone (DESYREL) 50 mg tablet Take 1 tablet (50 mg total) by mouth daily at bedtime, Starting Thu 11/10/2022, Normal      Diclofenac Sodium (VOLTAREN) 1 % Apply 2 g topically 4 (four) times a day, Starting Tue 9/27/2022, Normal      ergocalciferol (ERGOCALCIFEROL) 1 25 MG (62836 UT) capsule Take 50,000 Units by mouth, Starting Mon 9/20/2021, Until Thu 7/21/2022 at 2359, Historical Med      Creon 51533-57547 units capsule take 1 capsule of 12,000 units of lipase by mouth up to 3 times daily as needed for snacks, Normal       !! - Potential duplicate medications found  Please discuss with provider  No discharge procedures on file      PDMP Review       Value Time User    PDMP Reviewed  Yes 1/20/2022  9:27 AM LUANA Nathan          ED Provider  Electronically Signed by           Angela Blum MD  01/19/23 7538

## 2023-01-23 ENCOUNTER — APPOINTMENT (OUTPATIENT)
Dept: PREADMISSION TESTING | Facility: HOSPITAL | Age: 48
End: 2023-01-23

## 2023-01-23 LAB
ALBUMIN SERPL-MCNC: 4.6 G/DL (ref 3.6–5.1)
ALBUMIN/GLOB SERPL: 1.9 (CALC) (ref 1–2.5)
ALP SERPL-CCNC: 138 U/L (ref 36–130)
ALT SERPL-CCNC: 48 U/L (ref 9–46)
AST SERPL-CCNC: 59 U/L (ref 10–40)
BASOPHILS # BLD AUTO: 51 CELLS/UL (ref 0–200)
BASOPHILS NFR BLD AUTO: 0.8 %
BILIRUB SERPL-MCNC: 1.2 MG/DL (ref 0.2–1.2)
BUN SERPL-MCNC: 14 MG/DL (ref 7–25)
BUN/CREAT SERPL: ABNORMAL (CALC) (ref 6–22)
CALCIUM SERPL-MCNC: 9.5 MG/DL (ref 8.6–10.3)
CHLORIDE SERPL-SCNC: 98 MMOL/L (ref 98–110)
CO2 SERPL-SCNC: 29 MMOL/L (ref 20–32)
CREAT SERPL-MCNC: 0.61 MG/DL (ref 0.6–1.29)
EOSINOPHIL # BLD AUTO: 102 CELLS/UL (ref 15–500)
EOSINOPHIL NFR BLD AUTO: 1.6 %
ERYTHROCYTE [DISTWIDTH] IN BLOOD BY AUTOMATED COUNT: 12.3 % (ref 11–15)
GFR/BSA.PRED SERPLBLD CYS-BASED-ARV: 118 ML/MIN/1.73M2
GLOBULIN SER CALC-MCNC: 2.4 G/DL (CALC) (ref 1.9–3.7)
GLUCOSE SERPL-MCNC: 91 MG/DL (ref 65–99)
HCT VFR BLD AUTO: 41.4 % (ref 38.5–50)
HGB BLD-MCNC: 14.1 G/DL (ref 13.2–17.1)
INR PPP: 1.1
LYMPHOCYTES # BLD AUTO: 1850 CELLS/UL (ref 850–3900)
LYMPHOCYTES NFR BLD AUTO: 28.9 %
MCH RBC QN AUTO: 33.7 PG (ref 27–33)
MCHC RBC AUTO-ENTMCNC: 34.1 G/DL (ref 32–36)
MCV RBC AUTO: 98.8 FL (ref 80–100)
MONOCYTES # BLD AUTO: 621 CELLS/UL (ref 200–950)
MONOCYTES NFR BLD AUTO: 9.7 %
NEUTROPHILS # BLD AUTO: 3776 CELLS/UL (ref 1500–7800)
NEUTROPHILS NFR BLD AUTO: 59 %
PLATELET # BLD AUTO: 114 THOUSAND/UL (ref 140–400)
PMV BLD REES-ECKER: 11.8 FL (ref 7.5–12.5)
POTASSIUM SERPL-SCNC: 4.2 MMOL/L (ref 3.5–5.3)
PROT SERPL-MCNC: 7 G/DL (ref 6.1–8.1)
PROTHROMBIN TIME: 11.2 SEC (ref 9–11.5)
RBC # BLD AUTO: 4.19 MILLION/UL (ref 4.2–5.8)
SODIUM SERPL-SCNC: 136 MMOL/L (ref 135–146)
WBC # BLD AUTO: 6.4 THOUSAND/UL (ref 3.8–10.8)

## 2023-01-24 ENCOUNTER — OFFICE VISIT (OUTPATIENT)
Dept: LAB | Facility: HOSPITAL | Age: 48
End: 2023-01-24

## 2023-01-24 DIAGNOSIS — R10.31 RIGHT GROIN PAIN: ICD-10-CM

## 2023-01-24 DIAGNOSIS — K40.20 NON-RECURRENT BILATERAL INGUINAL HERNIA WITHOUT OBSTRUCTION OR GANGRENE: ICD-10-CM

## 2023-01-24 LAB
ATRIAL RATE: 93 BPM
P AXIS: -15 DEGREES
PR INTERVAL: 114 MS
QRS AXIS: 12 DEGREES
QRSD INTERVAL: 122 MS
QT INTERVAL: 376 MS
QTC INTERVAL: 467 MS
T WAVE AXIS: 13 DEGREES
VENTRICULAR RATE: 93 BPM

## 2023-01-25 ENCOUNTER — OFFICE VISIT (OUTPATIENT)
Dept: FAMILY MEDICINE CLINIC | Facility: CLINIC | Age: 48
End: 2023-01-25

## 2023-01-25 VITALS
OXYGEN SATURATION: 97 % | TEMPERATURE: 97.2 F | BODY MASS INDEX: 27.13 KG/M2 | SYSTOLIC BLOOD PRESSURE: 124 MMHG | HEART RATE: 72 BPM | WEIGHT: 193.8 LBS | HEIGHT: 71 IN | DIASTOLIC BLOOD PRESSURE: 70 MMHG

## 2023-01-25 DIAGNOSIS — Z01.818 PRE-OP EXAMINATION: Primary | ICD-10-CM

## 2023-01-25 NOTE — PROGRESS NOTES
INTERNAL MEDICINE PRE-OPERATIVE EVALUATION  [facility]YS[facility] - [address] [facility] [address] [facility]    NAME: Carole Mckeon  AGE: 50 y o  SEX: male  : 1975     DATE: 2023     Internal Medicine Pre-Operative Evaluation:     Chief Complaint: Pre-operative Evaluation     Surgery: REPAIR HERNIA INGUINAL, LAPAROSCOPIC, Possible Open (Bilateral: Groin)  Anticipated Date of Surgery: 2/3/2023  Referring Provider: Tadeo Bui, *        History of Present Illness:     Carole Mckeon is a 50 y o  male who presents to the office today for a preoperative consultation at the request of surgeon, dangelo, who plans on performing REPAIR HERNIA INGUINAL, LAPAROSCOPIC, Possible Open (Bilateral: Groin)  Planned anesthesia is general  Patient has a bleeding risk of: no recent abnormal bleeding  Patient does not have objections to receiving blood products if needed  Current anti-platelet/anti-coagulation medications that the patient is prescribed includes: hold motrin , nsaids   Assessment of Chronic Conditions:   - Hypertension: stable   - chronic pancreatitis      Assessment of Cardiac Risk:  · Denies unstable or severe angina or MI in the last 6 weeks or history of stent placement in the last year   · Denies decompensated heart failure (e g  New onset heart failure, NYHA functional class IV heart failure, or worsening existing heart failure)  · Denies significant arrhythmias such as high grade AV block, symptomatic ventricular arrhythmia, newly recognized ventricular tachycardia, supraventricular tachycardia with resting heart rate >100, or symptomatic bradycardia  · Denies severe heart valve disease including aortic stenosis or symptomatic mitral stenosis     Exercise Capacity:  · Able to walk 4 blocks without symptoms?: Yes  · Able to walk 2 flights without symptoms?: Yes    Prior Anesthesia Reactions: No     Personal history of venous thromboembolic disease?  No    History of steroid use for >2 weeks within last year? No          Review of Systems:     Review of Systems   Constitutional: Negative for appetite change, chills, fever and unexpected weight change  HENT: Negative for congestion, dental problem, ear pain, hearing loss, postnasal drip, rhinorrhea, sinus pressure, sinus pain, sneezing, sore throat, tinnitus and voice change  Eyes: Negative for visual disturbance  Respiratory: Negative for apnea, cough, chest tightness and shortness of breath  Cardiovascular: Negative for chest pain, palpitations and leg swelling  Gastrointestinal: Negative for abdominal pain, blood in stool, constipation, diarrhea, nausea and vomiting  Endocrine: Negative for cold intolerance, heat intolerance, polydipsia, polyphagia and polyuria  Genitourinary: Negative for decreased urine volume, difficulty urinating, dysuria, frequency and hematuria  Musculoskeletal: Negative for arthralgias, back pain, gait problem, joint swelling and myalgias  Skin: Negative for color change, rash and wound  Allergic/Immunologic: Negative for environmental allergies and food allergies  Neurological: Negative for dizziness, syncope, weakness, light-headedness, numbness and headaches  Hematological: Negative for adenopathy  Does not bruise/bleed easily  Psychiatric/Behavioral: Negative for sleep disturbance and suicidal ideas  The patient is not nervous/anxious           Problem List:     Patient Active Problem List   Diagnosis   • Pancreatic lesion   • Renal mass   • Encounter for smoking cessation counseling   • Leukocytosis   • RBBB   • Epigastric pain   • Generalized abdominal pain   • Abnormal transaminases   • Acute on chronic pancreatitis (HCC)   • Leukopenia   • Hypertriglyceridemia   • Chronic pain syndrome   • Esophagitis   • Chronic pancreatitis (HCC)   • Chronic gastritis without bleeding   • Uncomplicated opioid dependence ([facility] [address] )   • Long-term current use of opiate analgesic   • GERD (gastroesophageal reflux disease)   • Hyponatremia   • Healthcare maintenance   • Elevated blood pressure reading   • Upper abdominal pain   • Hypertension   • Pancreatitis, unspecified pancreatitis type   • Abdominal pain, epigastric   • Change in bowel habits   • Musculoskeletal neck pain   • Prediabetes   • Liver abscess   • Cervical spondylosis   • Tremor of both outstretched hands   • Tremor, essential   • Right groin pain   • Non-recurrent bilateral inguinal hernia without obstruction or gangrene        Allergies:      Allergies   Allergen Reactions   • Bee Venom Swelling        Current Medications:       Current Outpatient Medications:   •  albuterol (PROVENTIL HFA,VENTOLIN HFA) 90 mcg/act inhaler, INHALE TWO PUFFS BY MOUTH EVERY 6 HOURS AS NEEDED FOR WHEEZING, Disp: 54 g, Rfl: 0  •  amLODIPine (NORVASC) 5 mg tablet, TAKE ONE TABLET BY MOUTH EVERY DAY, Disp: 30 tablet, Rfl: 0  •  Choline Fenofibrate (Fenofibric Acid) 135 MG CPDR, TAKE ONE CAPSULE BY MOUTH EVERY DAY, Disp: 90 capsule, Rfl: 0  •  Choline Fenofibrate (Fenofibric Acid) 135 MG CPDR, Take 1 capsule (135 mg total) by mouth daily, Disp: 90 capsule, Rfl: 0  •  Creon 04031-68848 units capsule, take 1 capsule by mouth up to 3 times per day as needed for snacks, Disp: 90 capsule, Rfl: 0  •  Diclofenac Sodium (VOLTAREN) 1 %, Apply 2 g topically 4 (four) times a day, Disp: 150 g, Rfl: 3  •  Empagliflozin 10 MG TABS, Take 10 mg by mouth every morning, Disp: , Rfl:   •  esomeprazole (NexIUM) 40 MG capsule, Take 1 capsule (40 mg total) by mouth 2 (two) times a day before meals, Disp: 60 capsule, Rfl: 11  •  famotidine (PEPCID) 40 MG tablet, Take 40 mg by mouth daily at bedtime as needed , Disp: , Rfl:   •  ibuprofen (MOTRIN) 800 mg tablet, Take 1 tablet (800 mg total) by mouth 3 (three) times a day, Disp: 21 tablet, Rfl: 0  •  insulin degludec Wilsheila Scottes FlexTouch) 100 units/mL injection pen, 20 units subcut in hs, Disp: , Rfl:   •  Insulin Pen Needle (Pen Needles) 32G X 5 MM MISC, use once daily as directed, Disp: , Rfl:   •  lisinopril (ZESTRIL) 20 mg tablet, Take 1 tablet (20 mg total) by mouth daily, Disp: 30 tablet, Rfl: 5  •  omega-3-acid ethyl esters (LOVAZA) 1 g capsule, Take 2 capsules (2 g total) by mouth 2 (two) times a day, Disp: 360 capsule, Rfl: 0  •  ondansetron (ZOFRAN-ODT) 4 mg disintegrating tablet, Take 1 tablet (4 mg total) by mouth every 8 (eight) hours, Disp: 30 tablet, Rfl: 0  •  Pancrelipase, Lip-Prot-Amyl, (Creon) 11051-692512 units CPEP, Take 1 capsule (36,000 Units total) by mouth 3 (three) times a day before meals, Disp: 90 capsule, Rfl: 0  •  propranolol (INDERAL) 20 mg tablet, TAKE 1 TABLET BY MOUTH TWICE DAILY, Disp: 60 tablet, Rfl: 5  •  rosuvastatin (CRESTOR) 40 MG tablet, TAKE ONE TABLET BY MOUTH EVERY DAY, Disp: 90 tablet, Rfl: 0  •  traZODone (DESYREL) 50 mg tablet, Take 1 tablet (50 mg total) by mouth daily at bedtime, Disp: 30 tablet, Rfl: 0  •  ergocalciferol (ERGOCALCIFEROL) 1 25 MG (03222 UT) capsule, Take 50,000 Units by mouth (Patient not taking: Reported on 1/17/2023), Disp: , Rfl:      Past History:     Past Medical History:   Diagnosis Date   • Asthma    • Chronic pain disorder    • GERD (gastroesophageal reflux disease)    • Hyperlipidemia    • Liver abscess    • Meniscus tear     left knee work injury last assessed 08/24/2016   • Pancreatitis    • Pneumonia         Past Surgical History:   Procedure Laterality Date   • CELIAC PLEXUS BLOCK Bilateral 10/29/2018    Procedure: SPLANCHNIC NERVE BLOCK;  Surgeon: Jesica Damon MD;  [facility];  Service: Pain Management    • CELIAC PLEXUS BLOCK Bilateral 1/10/2019    Procedure: SPLANCHNIC NERVE BLOCK;  Surgeon: Jesica Damon MD;  [facility];  Service: Pain Management    • CHOLECYSTECTOMY     • ESOPHAGOGASTRODUODENOSCOPY N/A 11/16/2017    Procedure: ESOPHAGOGASTRODUODENOSCOPY (EGD); Surgeon: Marga Villa MD;  [facility];   Service: Gastroenterology • ESOPHAGOGASTRODUODENOSCOPY N/A 4/19/2018    Procedure: ESOPHAGOGASTRODUODENOSCOPY (EGD); Surgeon: Branden Kemp MD;  [facility]; Service: Gastroenterology   • ESOPHAGOGASTRODUODENOSCOPY N/A 5/10/2018    Procedure: ESOPHAGOGASTRODUODENOSCOPY (EGD); Surgeon: Ienz Esquivel MD;  [facility];   Service: Gastroenterology   • IR TEMPORARY DIALYSIS CATHETER PLACEMENT  2/28/2019   • KNEE ARTHROSCOPY Bilateral    • KNEE ARTHROSCOPY Right 2009    cibishino last assessed 08/24/2016   • KNEE ARTHROSCOPY Right 07/01/2019   • LIVER SURGERY     • NERVE BLOCK Bilateral 5/29/2018    Procedure: SPLANCHNIC NERVE BLOCK;  Surgeon: Jossie Hall MD;  [facility];  Service: Pain Management    • PANCREAS SURGERY      stents   • PANCREATIC CYST EXCISION     • MD INJECTION ANES LMBR/THRC PARAVERTBRL SYMPATHETIC Bilateral 12/28/2017    Procedure: SPLANCHNIC NERVE BLOCK;  Surgeon: Jossie Hall MD;  [facility];  Service: Pain Management    • MD INJX ANES CELIAC PLEXUS W/WO RADIOLOGIC MONITRNG Bilateral 5/9/2017    Procedure: CELIAC PLEXUS BLOCK ;  Surgeon: Jossie Hall MD;  [facility];  Service: Pain Management    • MD INJX ANES CELIAC PLEXUS W/WO RADIOLOGIC MONITRNG Bilateral 6/1/2017    Procedure: SPLANCHNIC NERVE BLOCK at T12;  Surgeon: Jossie Hall MD;  [facility];  Service: Pain Management    • MD INJX ANES CELIAC PLEXUS W/WO RADIOLOGIC MONITRNG Bilateral 8/8/2017    Procedure: BILATERAL SPLANCHNIC NERVE BLOCK T12;  Surgeon: Jossie Hall MD;  [facility];  Service: Pain Management    • MD INJX ANES CELIAC PLEXUS W/WO RADIOLOGIC MONITRNG Bilateral 4/18/2019    Procedure: BLOCK / INJECTION CELIAC PLEXUS;  Surgeon: Jossie Hall MD;  [facility];  Service: Pain Management    • MD INJX ANES CELIAC PLEXUS W/WO RADIOLOGIC MONITRNG Bilateral 8/20/2019    Procedure: SPLANCHNIC NERVE BLOCK;  Surgeon: Jossie Hall MD;  [facility];  Service: Pain Management    • AK INJX ANES CELIAC PLEXUS W/WO RADIOLOGIC MONITRNG Bilateral 10/17/2019    Procedure: SPLANCHNIC NERVE BLOCK;  Surgeon: Estefany Holcomb MD;  [facility];  Service: Pain Management    • AK LAPAROSCOPY SURG CHOLECYSTECTOMY N/A 2017    Procedure: LAPAROSCOPIC CHOLECYSTECTOMY, IOC, POSSIBLE OPEN ;  Surgeon: Pebbles Chaves MD;  [facility];  Service: General   • ROTATOR CUFF REPAIR Right    • SHOULDER ARTHROSCOPY     • SHOULDER ARTHROSCOPY Right         Family History   Problem Relation Age of Onset   • Cirrhosis Mother    • Heart disease Other         cardiac disorder   • Cancer Other         Social History     Socioeconomic History   • Marital status: /Civil Union     Spouse name: Not on file   • Number of children: Not on file   • Years of education: Not on file   • Highest education level: Not on file   Occupational History   • Occupation: fulltime employment   Tobacco Use   • Smoking status: Former     Packs/day: 1 00     Years: 20 00     Pack years: 20      Types: Cigarettes     Quit date: 3/15/2021     Years since quittin 8   • Smokeless tobacco: Never   Vaping Use   • Vaping Use: Never used   Substance and Sexual Activity   • Alcohol use:  Yes     Alcohol/week: 10 0 standard drinks     Types: 10 Cans of beer per week     Comment: on weekends   • Drug use: No   • Sexual activity: Yes     Partners: Female   Other Topics Concern   • Not on file   Social History Narrative    Lives with family    Daily caffeine consumption     Social Determinants of Health     Financial Resource Strain: Not on file   Food Insecurity: Not on file   Transportation Needs: Not on file   Physical Activity: Not on file   Stress: Not on file   Social Connections: Not on file   Intimate Partner Violence: Not on file   Housing Stability: Not on file        Physical Exam:      /84 (BP Location: Left arm, Patient Position: Sitting, Cuff Size: Standard)   Pulse 72   Temp (!) 97 2 °F (36 2 °C) (Tympanic)   Ht 5' 11" (1 803 m)   Wt 87 9 kg (193 lb 12 8 oz)   SpO2 97%   BMI 27 03 kg/m²     Physical Exam  Constitutional:       General: He is not in acute distress  Appearance: He is well-developed  He is not ill-appearing or toxic-appearing  HENT:      Head: Normocephalic and atraumatic  Nose: Nose normal    Eyes:      General: No scleral icterus  Cardiovascular:      Rate and Rhythm: Normal rate and regular rhythm  Heart sounds: Normal heart sounds  Pulmonary:      Effort: Pulmonary effort is normal       Breath sounds: Normal breath sounds  Abdominal:      General: Abdomen is protuberant  Bowel sounds are normal       Palpations: Abdomen is soft  Musculoskeletal:         General: Normal range of motion  Cervical back: Normal range of motion and neck supple  Skin:     General: Skin is warm and dry  Neurological:      Mental Status: He is alert and oriented to person, place, and time  Deep Tendon Reflexes: Reflexes are normal and symmetric  Psychiatric:         Mood and Affect: Mood normal          Behavior: Behavior normal          Thought Content: Thought content normal          Judgment: Judgment normal            Data:     Pre-operative work-up    Laboratory Results: I have personally reviewed the pertinent laboratory results/reports     EKG: I have personally reviewed pertinent reports  Previous cardiopulmonary studies within the past year:       Assessment:     No diagnosis found  Plan:     50 y o  male with planned surgery:REPAIR HERNIA INGUINAL, LAPAROSCOPIC, Possible Open (Bilateral: Groin)      Cardiac Risk Estimation: per the Revised Cardiac Risk Index (Circ  100:1043, 1999), the patient's risk factors for cardiac complications include on insulin and secondary to chronic pancreeatitis , putting him in: RCI RISK CLASS II (1 risk factor, risk of major cardiac compl  appr  1 3%)      1  Further preoperative workup as follows:   - None; no further preoperative work-up is required    2  Medication Management/Recommendations:   - None, continue medication regimen including morning of surgery, with sip of water  - Patient has been instructed to avoid herbs or non-directed vitamins the week prior to surgery to ensure no drug interactions with perioperative surgical and anesthetic medications  - Patient should continue antihypertensive medications up through and including the day of surgery  - Patient should hold diuretic medication the day of surgery  - Insulin Management: hold night prior   - Patient has been instructed to avoid aspirin containing medications or non-steroidal anti-inflammatory drugs for the week preceding surgery  3  Prophylaxis for cardiac events with perioperative beta-blockers: not indicated  4  Patient requires further consultation with: None    Clearance  Patient is CLEARED for surgery without any additional cardiac testing       LUANA Corbett  [place] [id number] [address] [address]  Phone#  [phone]  Fax#  [phone]

## 2023-01-30 NOTE — PRE-PROCEDURE INSTRUCTIONS
Pre-Surgery Instructions:   Medication Instructions   • albuterol (PROVENTIL HFA,VENTOLIN HFA) 90 mcg/act inhaler Uses PRN- OK to take day of surgery   • amLODIPine (NORVASC) 5 mg tablet Take day of surgery  • Choline Fenofibrate (Fenofibric Acid) 135 MG CPDR Take day of surgery  • Creon 29744-96456 units capsule Hold day of surgery  • Empagliflozin 10 MG TABS Instructions provided by MD   • esomeprazole (NexIUM) 40 MG capsule Take day of surgery  • famotidine (PEPCID) 40 MG tablet Take night before surgery   • insulin degludec Timothy Captain FlexTouch) 100 units/mL injection pen Instructions provided by MD   • lisinopril (ZESTRIL) 20 mg tablet Hold day of surgery  • Multiple Vitamins-Minerals (MULTIVITAMIN ADULTS PO) Stop taking   • omega-3-acid ethyl esters (LOVAZA) 1 g capsule Stop taking   • ondansetron (ZOFRAN-ODT) 4 mg disintegrating tablet Uses PRN- OK to take day of surgery   • Pancrelipase, Lip-Prot-Amyl, (Creon) 25083-713329 units CPEP Hold day of surgery  • propranolol (INDERAL) 20 mg tablet Take day of surgery  • rosuvastatin (CRESTOR) 40 MG tablet Take day of surgery  • traZODone (DESYREL) 50 mg tablet Take night before surgery   Have you had / have a sore throat? No  Have you had / have a cough less than 1 week? No  Have you had / have a fever greater than 100 0 - 100  4? No  Are you experiencing any shortness of breath? No    Review with patient via phone medications and showering instructions  Advised don't take NSAID's, ok tylenol products  Advised ASC call with surgery schedule time, nothing eat or drink after midnight  Verbalized understanding

## 2023-02-01 ENCOUNTER — HOSPITAL ENCOUNTER (EMERGENCY)
Facility: HOSPITAL | Age: 48
Discharge: HOME | End: 2023-02-01
Attending: EMERGENCY MEDICINE

## 2023-02-01 ENCOUNTER — TELEPHONE (OUTPATIENT)
Dept: OBGYN CLINIC | Facility: HOSPITAL | Age: 48
End: 2023-02-01

## 2023-02-01 ENCOUNTER — APPOINTMENT (EMERGENCY)
Dept: RADIOLOGY | Facility: HOSPITAL | Age: 48
End: 2023-02-01

## 2023-02-01 VITALS
SYSTOLIC BLOOD PRESSURE: 157 MMHG | TEMPERATURE: 97.9 F | RESPIRATION RATE: 20 BRPM | DIASTOLIC BLOOD PRESSURE: 72 MMHG | HEART RATE: 93 BPM | OXYGEN SATURATION: 99 %

## 2023-02-01 DIAGNOSIS — S93.491A SPRAIN OF ANTERIOR TALOFIBULAR LIGAMENT OF RIGHT ANKLE, INITIAL ENCOUNTER: Primary | ICD-10-CM

## 2023-02-01 NOTE — ED PROVIDER NOTES
History  Chief Complaint   Patient presents with   • Ankle Injury     R ankle and knee pain after a fall off chair while standing, denies head strike or thinners      Sudden onset R ankle and foot pain 5 days ago after falling off a chair  Still able to bear weight and ambulate but pain and bruising have been increasing since onset  No cp or sob or leg swelling  Not on thinners  No knee or hip pain  Prior to Admission Medications   Prescriptions Last Dose Informant Patient Reported? Taking?    Choline Fenofibrate (Fenofibric Acid) 135 MG CPDR   No No   Sig: Take 1 capsule (135 mg total) by mouth daily   Creon 87980-14331 units capsule   No No   Sig: take 1 capsule by mouth up to 3 times per day as needed for snacks   Empagliflozin 10 MG TABS   Yes No   Sig: Take 10 mg by mouth every morning   Insulin Pen Needle (Pen Needles) 32G X 5 MM MISC   Yes No   Sig: use once daily as directed   Multiple Vitamins-Minerals (MULTIVITAMIN ADULTS PO)   Yes No   Sig: Take by mouth daily after breakfast   Pancrelipase, Lip-Prot-Amyl, (Creon) 20112-655073 units CPEP   No No   Sig: Take 1 capsule (36,000 Units total) by mouth 3 (three) times a day before meals   albuterol (PROVENTIL HFA,VENTOLIN HFA) 90 mcg/act inhaler   No No   Sig: INHALE TWO PUFFS BY MOUTH EVERY 6 HOURS AS NEEDED FOR WHEEZING   amLODIPine (NORVASC) 5 mg tablet   No No   Sig: TAKE ONE TABLET BY MOUTH EVERY DAY   esomeprazole (NexIUM) 40 MG capsule   No No   Sig: Take 1 capsule (40 mg total) by mouth 2 (two) times a day before meals   Patient taking differently: Take 40 mg by mouth daily after breakfast   famotidine (PEPCID) 40 MG tablet   Yes No   Sig: Take 40 mg by mouth daily at bedtime as needed    insulin degludec [place] FlexTouch) 100 units/mL injection pen   Yes No   Si units subcut in hs   lisinopril (ZESTRIL) 20 mg tablet   No No   Sig: Take 1 tablet (20 mg total) by mouth daily   omega-3-acid ethyl esters (LOVAZA) 1 g capsule   No No   Sig: Take 2 capsules (2 g total) by mouth 2 (two) times a day   ondansetron (ZOFRAN-ODT) 4 mg disintegrating tablet   No No   Sig: Take 1 tablet (4 mg total) by mouth every 8 (eight) hours   propranolol (INDERAL) 20 mg tablet   No No   Sig: TAKE 1 TABLET BY MOUTH TWICE DAILY   rosuvastatin (CRESTOR) 40 MG tablet   No No   Sig: TAKE ONE TABLET BY MOUTH EVERY DAY   traZODone (DESYREL) 50 mg tablet   No No   Sig: Take 1 tablet (50 mg total) by mouth daily at bedtime      Facility-Administered Medications: None       Past Medical History:   Diagnosis Date   • Asthma    • Chronic pain disorder    • GERD (gastroesophageal reflux disease)    • Hyperlipidemia    • Hypertension    • Liver abscess    • Meniscus tear     left knee work injury last assessed 08/24/2016   • Pancreatitis    • Pneumonia        Past Surgical History:   Procedure Laterality Date   • CELIAC PLEXUS BLOCK Bilateral 10/29/2018    Procedure: SPLANCHNIC NERVE BLOCK;  Surgeon: Jaime Kearney MD;  [facility];  Service: Pain Management    • CELIAC PLEXUS BLOCK Bilateral 01/10/2019    Procedure: SPLANCHNIC NERVE BLOCK;  Surgeon: Jaime Kearney MD;  [facility];  Service: Pain Management    • CHOLECYSTECTOMY     • COLONOSCOPY     • ESOPHAGOGASTRODUODENOSCOPY N/A 11/16/2017    Procedure: ESOPHAGOGASTRODUODENOSCOPY (EGD); Surgeon: Eli Gallegos MD;  [facility]; Service: Gastroenterology   • ESOPHAGOGASTRODUODENOSCOPY N/A 04/19/2018    Procedure: ESOPHAGOGASTRODUODENOSCOPY (EGD); Surgeon: Rocio Witt MD;  [facility]; Service: Gastroenterology   • ESOPHAGOGASTRODUODENOSCOPY N/A 05/10/2018    Procedure: ESOPHAGOGASTRODUODENOSCOPY (EGD); Surgeon: Mercy Head MD;  [facility];   Service: Gastroenterology   • IR TEMPORARY DIALYSIS CATHETER PLACEMENT  02/28/2019   • KNEE ARTHROSCOPY Bilateral    • KNEE ARTHROSCOPY Right 2009    edgardoishino last assessed 08/24/2016   • KNEE ARTHROSCOPY Right 07/01/2019   • LIVER SURGERY • NERVE BLOCK Bilateral 05/29/2018    Procedure: SPLANCHNIC NERVE BLOCK;  Surgeon: Mellisa Baldwin MD;  [facility];  Service: Pain Management    • PANCREAS SURGERY      stents   • PANCREATIC CYST EXCISION     • HI INJECTION ANES LMBR/THRC PARAVERTBRL SYMPATHETIC Bilateral 12/28/2017    Procedure: SPLANCHNIC NERVE BLOCK;  Surgeon: Mellisa Baldwin MD;  [facility];  Service: Pain Management    •  [facility] W/WO RADIOLOGIC MONITRNG Bilateral 05/09/2017    Procedure: CELIAC PLEXUS BLOCK ;  Surgeon: Mellisa Baldwin MD;  [facility];  Service: Pain Management    •  [facility] W/WO RADIOLOGIC MONITRNG Bilateral 06/01/2017    Procedure: SPLANCHNIC NERVE BLOCK at T12;  Surgeon: Mellisa Baldwin MD;  [facility];  Service: Pain Management    •  [facility] W/WO RADIOLOGIC MONITRNG Bilateral 08/08/2017    Procedure: BILATERAL SPLANCHNIC NERVE BLOCK T12;  Surgeon: Mellisa Baldwin MD;  [facility];  Service: Pain Management    • HI INJX ANES CELIAC PLEXUS W/WO RADIOLOGIC MONITRNG Bilateral 04/18/2019    Procedure: BLOCK / INJECTION CELIAC PLEXUS;  Surgeon: Mellisa Baldwin MD;  [facility];  Service: Pain Management    • HI INJX ANES CELIAC PLEXUS W/WO RADIOLOGIC MONITRNG Bilateral 08/20/2019    Procedure: SPLANCHNIC NERVE BLOCK;  Surgeon: Mellisa Baldwin MD;  [facility];  Service: Pain Management    • HI INJX ANES CELIAC PLEXUS W/WO RADIOLOGIC MONITRNG Bilateral 10/17/2019    Procedure: SPLANCHNIC NERVE BLOCK;  Surgeon: Mellisa Baldwin MD;  [facility];  Service: Pain Management    • HI LAPAROSCOPY SURG CHOLECYSTECTOMY N/A 02/14/2017    Procedure: LAPAROSCOPIC CHOLECYSTECTOMY, IOC, POSSIBLE OPEN ;  Surgeon: Patricia Rausch MD;  [facility];  Service: General   • ROTATOR CUFF REPAIR Right    • SHOULDER ARTHROSCOPY     • SHOULDER ARTHROSCOPY Right        Family History   Problem Relation Age of Onset   • Cirrhosis Mother    • Heart disease Other         cardiac disorder   • Cancer Other      I have reviewed and agree with the history as documented  E-Cigarette/Vaping   • E-Cigarette Use Former User      E-Cigarette/Vaping Substances   • Nicotine Yes    • THC No    • CBD No    • Flavoring No    • Other No    • Unknown No      Social History     Tobacco Use   • Smoking status: Former     Packs/day: 1 00     Years: 20 00     Pack years: 20 00     Types: Cigarettes     Quit date: 3/15/2021     Years since quittin 8   • Smokeless tobacco: Never   Vaping Use   • Vaping Use: Former   • Substances: Nicotine   Substance Use Topics   • Alcohol use: Yes     Alcohol/week: 2 0 standard drinks     Types: 2 Cans of beer per week     Comment: on weekends   • Drug use: Never       Review of Systems   Musculoskeletal: Positive for arthralgias  All other systems reviewed and are negative  Physical Exam  Physical Exam  Vitals and nursing note reviewed  Constitutional:       General: He is not in acute distress  Appearance: He is well-developed  He is not diaphoretic  HENT:      Head: Normocephalic and atraumatic  Eyes:      General:         Right eye: No discharge  Left eye: No discharge  Conjunctiva/sclera: Conjunctivae normal       Pupils: Pupils are equal, round, and reactive to light  Neck:      Vascular: No JVD  Pulmonary:      Breath sounds: No stridor  Musculoskeletal:         General: Swelling and tenderness present  No deformity  Normal range of motion  Cervical back: Normal range of motion and neck supple  Comments: Ecchymosis and swelling over proximal lateral R foot c/w ATFL injury  No ttp of 5th MT or midfoot  Normal distal perfusion and sensation  Some ecchymosis at base of 2nd through 4th toes  Skin:     General: Skin is warm and dry  Capillary Refill: Capillary refill takes less than 2 seconds  Coloration: Skin is not pale        Findings: No erythema or rash    Neurological:      Mental Status: He is alert and oriented to person, place, and time  Cranial Nerves: No cranial nerve deficit  Sensory: No sensory deficit  Motor: No abnormal muscle tone  Coordination: Coordination normal          Vital Signs  ED Triage Vitals [02/01/23 1351]   Temperature Pulse Respirations Blood Pressure SpO2   97 9 °F (36 6 °C) 93 20 157/72 99 %      Temp Source Heart Rate Source Patient Position - Orthostatic VS BP Location FiO2 (%)   Temporal Monitor Sitting Left arm --      Pain Score       --           Vitals:    02/01/23 1351   BP: 157/72   Pulse: 93   Patient Position - Orthostatic VS: Sitting         Visual Acuity      ED Medications  Medications - No data to display    Diagnostic Studies  Results Reviewed     None                 XR ankle 3+ views RIGHT   ED Interpretation by Carolyn Torres MD (02/01 1428)   Normal study  Procedures  Procedures         ED Course                               SBIRT 20yo+    Flowsheet Row Most Recent Value   SBIRT (23 yo +)    In order to provide better care to our patients, we are screening all of our patients for alcohol and drug use  Would it be okay to ask you these screening questions? Yes Filed at: 02/01/2023 1411   Initial Alcohol Screen: US AUDIT-C     1  How often do you have a drink containing alcohol? 0 Filed at: 02/01/2023 1411   2  How many drinks containing alcohol do you have on a typical day you are drinking? 0 Filed at: 02/01/2023 1411   3a  Male UNDER 65: How often do you have five or more drinks on one occasion? 0 Filed at: 02/01/2023 1411   3b  FEMALE Any Age, or MALE 65+: How often do you have 4 or more drinks on one occassion? 0 Filed at: 02/01/2023 1411   Audit-C Score 0 Filed at: 02/01/2023 1411   EUSEBIO: How many times in the past year have you    Used an illegal drug or used a prescription medication for non-medical reasons?  Never Filed at: 02/01/2023 1411                    Medical Decision Making  Sprain of anterior talofibular ligament of right ankle, initial encounter: complicated acute illness or injury  Amount and/or Complexity of Data Reviewed  Radiology: ordered and independent interpretation performed  Disposition  Final diagnoses:   Sprain of anterior talofibular ligament of right ankle, initial encounter     Time reflects when diagnosis was documented in both MDM as applicable and the Disposition within this note     Time User Action Codes Description Comment    2/1/2023  2:29 PM Melvi Vargas Add [[id number]] Sprain of anterior talofibular ligament of right ankle, initial encounter       ED Disposition     ED Disposition   Discharge    Condition   Stable    Date/Time   Wed Feb 1, 2023  2:29 PM    Giuliano discharge to home/self care                 Follow-up Information    None         Patient's Medications   Discharge Prescriptions    No medications on file           PDMP Review       Value Time User    PDMP Reviewed  Yes 1/20/2022  9:27 AM Nelida Scheuermann, CRNP          ED Provider  Electronically Signed by           Tia Shaikh MD  02/01/23 5918

## 2023-02-01 NOTE — TELEPHONE ENCOUNTER
Caller: Geraldine    Doctor/Office:      Call regarding :   Foot appt     Call was transferred to: podiatry

## 2023-02-02 ENCOUNTER — TELEMEDICINE (OUTPATIENT)
Dept: NEUROLOGY | Facility: CLINIC | Age: 48
End: 2023-02-02

## 2023-02-02 VITALS — BODY MASS INDEX: 26.64 KG/M2 | WEIGHT: 191 LBS

## 2023-02-02 DIAGNOSIS — G25.0 TREMOR, ESSENTIAL: ICD-10-CM

## 2023-02-02 RX ORDER — PROPRANOLOL HYDROCHLORIDE 20 MG/1
TABLET ORAL
Qty: 180 TABLET | Refills: 3 | Status: ON HOLD | OUTPATIENT
Start: 2023-02-02

## 2023-02-02 NOTE — ASSESSMENT & PLAN NOTE
Benign essential tremor  Excellent control with Inderal 20 mg twice daily  He denies any adverse effects  We will have him continue on his current dose of Inderal 20 mg twice daily and he will follow-up in 1 year, sooner if needed

## 2023-02-02 NOTE — PROGRESS NOTES
Virtual Regular Visit    Verification of patient location:    Patient is located in the following state in which I hold an active license [place]      Assessment/Plan:    Problem List Items Addressed This Visit        Nervous and Auditory    Tremor, essential     Benign essential tremor  Excellent control with Inderal 20 mg twice daily  He denies any adverse effects  We will have him continue on his current dose of Inderal 20 mg twice daily and he will follow-up in 1 year, sooner if needed  Relevant Medications    propranolol (INDERAL) 20 mg tablet            Reason for visit is   Chief Complaint   Patient presents with   • Virtual Regular Visit        Encounter provider Mumtaz Bertrand [facility]    Provider located at [address] [id number] [facility]      Recent Visits  No visits were found meeting these conditions  Showing recent visits within past 7 days and meeting all other requirements  Today's Visits  Date Type Provider Dept   02/02/23 Telemedicine Loren [address] [place] [address] , [address] [place] [address] [address] today's visits and meeting all other requirements  Future Appointments  No visits were found meeting these conditions  Showing future appointments within next 150 days and meeting all other requirements       The patient was identified by name and date of birth  Otf Christopher was informed that this is a telemedicine visit and that the visit is being conducted through the [facility]  He agrees to proceed     My office door was closed  No one else was in the room  He acknowledged consent and understanding of privacy and security of the video platform  The patient has agreed to participate and understands they can discontinue the visit at any time  Patient is aware this is a billable service  Subjective  Otf Christopher is a 50 y o  male who returns to the office today to follow up on his tremor   He was last seen 8/16/2022 and since completed his MRI Brain which was unremarkable  He continues on Inderal 20 mg 1 tab twice daily  At his last visit we attempted to increase this further since he felt he only had about a 70% improvement in his tremor however unfortunately he did not tolerate the increase and noted difficulty with his memory  He returned to 20 mg bid with resolution of his memory concerns  He is tolerating his current dose well without any adverse effects, lightheadedness or dizziness  He tells me today that his tremor has recently become almost unnoticeable  If he misses a dose of Inderal he does notice an increase in tremor  He states he can now do his paperwork for work without difficulty and can sign his name  He does not smoke, or drink coffee  He denies any other excess stimulant use  He drinks 1 cup of green tea daily  He has recently been out of work awaiting hernia surgery so has been under less stress      He denies any freezing, magnetism of gait, postural instability, swallowing or walking difficulty, or bradykinesia  He denies any neurologic complaints today and is pleased with his current tremor control        HPI     Past Medical History:   Diagnosis Date   • Asthma    • Chronic pain disorder    • GERD (gastroesophageal reflux disease)    • Hyperlipidemia    • Hypertension    • Liver abscess    • Meniscus tear     left knee work injury last assessed 08/24/2016   • Pancreatitis    • Pneumonia        Past Surgical History:   Procedure Laterality Date   • CELIAC PLEXUS BLOCK Bilateral 10/29/2018    Procedure: SPLANCHNIC NERVE BLOCK;  Surgeon: Blanche Landau, MD;  [facility];  Service: Pain Management    • CELIAC PLEXUS BLOCK Bilateral 01/10/2019    Procedure: SPLANCHNIC NERVE BLOCK;  Surgeon: Blanche Landau, MD;  [facility];  Service: Pain Management    • CHOLECYSTECTOMY     • COLONOSCOPY     • ESOPHAGOGASTRODUODENOSCOPY N/A 11/16/2017    Procedure: ESOPHAGOGASTRODUODENOSCOPY (EGD); Surgeon: Ailyn Polanco MD;  [facility]; Service: Gastroenterology   • ESOPHAGOGASTRODUODENOSCOPY N/A 04/19/2018    Procedure: ESOPHAGOGASTRODUODENOSCOPY (EGD); Surgeon: Arlene Kim MD;  [facility]; Service: Gastroenterology   • ESOPHAGOGASTRODUODENOSCOPY N/A 05/10/2018    Procedure: ESOPHAGOGASTRODUODENOSCOPY (EGD); Surgeon: Haylie Bazan MD;  [facility];   Service: Gastroenterology   • IR TEMPORARY DIALYSIS CATHETER PLACEMENT  02/28/2019   • KNEE ARTHROSCOPY Bilateral    • KNEE ARTHROSCOPY Right 2009    cibishino last assessed 08/24/2016   • KNEE ARTHROSCOPY Right 07/01/2019   • LIVER SURGERY     • NERVE BLOCK Bilateral 05/29/2018    Procedure: SPLANCHNIC NERVE BLOCK;  Surgeon: Estefany Holcomb MD;  [facility];  Service: Pain Management    • PANCREAS SURGERY      stents   • PANCREATIC CYST EXCISION     • ND INJECTION ANES LMBR/THRC PARAVERTBRL SYMPATHETIC Bilateral 12/28/2017    Procedure: SPLANCHNIC NERVE BLOCK;  Surgeon: Estefany Holcomb MD;  [facility];  Service: Pain Management    •  [facility] W/WO RADIOLOGIC MONITRNG Bilateral 05/09/2017    Procedure: CELIAC PLEXUS BLOCK ;  Surgeon: Estefany Holcomb MD;  [facility];  Service: Pain Management    • ND INJX ANES CELIAC PLEXUS W/WO RADIOLOGIC MONITRNG Bilateral 06/01/2017    Procedure: SPLANCHNIC NERVE BLOCK at T12;  Surgeon: Estefany Holcomb MD;  [facility];  Service: Pain Management    • ND INJX ANES CELIAC PLEXUS W/WO RADIOLOGIC MONITRNG Bilateral 08/08/2017    Procedure: BILATERAL SPLANCHNIC NERVE BLOCK T12;  Surgeon: Estefany Holcomb MD;  [facility];  Service: Pain Management    • ND INJX ANES CELIAC PLEXUS W/WO RADIOLOGIC MONITRNG Bilateral 04/18/2019    Procedure: BLOCK / INJECTION CELIAC PLEXUS;  Surgeon: Estefany Holcomb MD;  [facility];  Service: Pain Management    • ND INJX ANES CELIAC PLEXUS W/WO RADIOLOGIC MONITRNG Bilateral 08/20/2019 Procedure: SPLANCHNIC NERVE BLOCK;  Surgeon: Mellisa Baldwin MD;  [facility];  Service: Pain Management    • [place] INJX ANES CELIAC PLEXUS W/WO RADIOLOGIC MONITRNG Bilateral 10/17/2019    Procedure: SPLANCHNIC NERVE BLOCK;  Surgeon: Mellisa Baldwin MD;  [facility];  Service: Pain Management    • OH LAPAROSCOPY SURG CHOLECYSTECTOMY N/A 02/14/2017    Procedure: LAPAROSCOPIC CHOLECYSTECTOMY, IOC, POSSIBLE OPEN ;  Surgeon: Patricia Rausch MD;  [facility];  Service: General   • ROTATOR CUFF REPAIR Right    • SHOULDER ARTHROSCOPY     • SHOULDER ARTHROSCOPY Right        Current Outpatient Medications   Medication Sig Dispense Refill   • albuterol (PROVENTIL HFA,VENTOLIN HFA) 90 mcg/act inhaler INHALE TWO PUFFS BY MOUTH EVERY 6 HOURS AS NEEDED FOR WHEEZING 54 g 0   • amLODIPine (NORVASC) 5 mg tablet TAKE ONE TABLET BY MOUTH EVERY DAY 30 tablet 0   • Choline Fenofibrate (Fenofibric Acid) 135 MG CPDR Take 1 capsule (135 mg total) by mouth daily 90 capsule 0   • Creon 33808-69574 units capsule take 1 capsule by mouth up to 3 times per day as needed for snacks 90 capsule 0   • Empagliflozin 10 MG TABS Take 10 mg by mouth every morning     • esomeprazole (NexIUM) 40 MG capsule Take 1 capsule (40 mg total) by mouth 2 (two) times a day before meals (Patient taking differently: Take 40 mg by mouth daily after breakfast) 60 capsule 11   • famotidine (PEPCID) 40 MG tablet Take 40 mg by mouth daily at bedtime as needed      • insulin degludec [place] FlexTouch) 100 units/mL injection pen 20 units subcut in hs     • Insulin Pen Needle (Pen Needles) 32G X 5 MM MISC use once daily as directed     • lisinopril (ZESTRIL) 20 mg tablet Take 1 tablet (20 mg total) by mouth daily 30 tablet 5   • Multiple Vitamins-Minerals (MULTIVITAMIN ADULTS PO) Take by mouth daily after breakfast     • omega-3-acid ethyl esters (LOVAZA) 1 g capsule Take 2 capsules (2 g total) by mouth 2 (two) times a day 360 capsule 0   • ondansetron (ZOFRAN-ODT) 4 mg disintegrating tablet Take 1 tablet (4 mg total) by mouth every 8 (eight) hours 30 tablet 0   • Pancrelipase, Lip-Prot-Amyl, (Creon) 17723-595465 units CPEP Take 1 capsule (36,000 Units total) by mouth 3 (three) times a day before meals 90 capsule 0   • propranolol (INDERAL) 20 mg tablet TAKE 1 TABLET BY MOUTH TWICE DAILY 180 tablet 3   • rosuvastatin (CRESTOR) 40 MG tablet TAKE ONE TABLET BY MOUTH EVERY DAY 90 tablet 0   • traZODone (DESYREL) 50 mg tablet Take 1 tablet (50 mg total) by mouth daily at bedtime 30 tablet 0     No current facility-administered medications for this visit  Allergies   Allergen Reactions   • Bee Venom Swelling       Review of Systems   Constitutional: Negative  Negative for appetite change and fever  HENT: Negative  Negative for hearing loss, tinnitus, trouble swallowing and voice change  Eyes: Negative  Negative for photophobia, pain and visual disturbance  Respiratory: Negative  Negative for shortness of breath  Cardiovascular: Negative  Negative for palpitations  Gastrointestinal: Negative  Negative for nausea and vomiting  Endocrine: Negative  Negative for cold intolerance  Genitourinary: Negative  Negative for dysuria, frequency and urgency  Musculoskeletal: Negative  Negative for gait problem, myalgias and neck pain  Skin: Negative  Negative for rash  Allergic/Immunologic: Negative  Neurological: Negative  Negative for dizziness, tremors, seizures, syncope, facial asymmetry, speech difficulty, weakness, light-headedness, numbness and headaches  Hematological: Negative  Does not bruise/bleed easily  Psychiatric/Behavioral: Negative  Negative for confusion, hallucinations and sleep disturbance  All other systems reviewed and are negative  Reviewed ROS as entered by medical assistant        Video Exam    Vitals:    02/02/23 0903   Weight: 86 6 kg (191 lb)       Physical Exam     On neurological examination the patient was awake, alert, attentive, oriented to person, place, and time  Recent and remote memory intact to conversation with no evidence of language dysfunction  Satisfactory fund of knowledge  Normal attention span and concentration  Mood, affect and judgement are appropriate  Speech is fluent without dysarthria or aphasia  Face appears symmetric, with no obvious weakness noted  Audition is intact to casual conversation  Eye movements are intact  Tongue is midline  Able to move bilateral upper extremities antigravity without difficulty        I spent 10 minutes directly with the patient during this visit

## 2023-02-03 ENCOUNTER — HOSPITAL ENCOUNTER (OUTPATIENT)
Facility: HOSPITAL | Age: 48
Setting detail: OUTPATIENT SURGERY
Discharge: HOME | End: 2023-02-03
Attending: STUDENT IN AN ORGANIZED HEALTH CARE EDUCATION/TRAINING PROGRAM | Admitting: STUDENT IN AN ORGANIZED HEALTH CARE EDUCATION/TRAINING PROGRAM

## 2023-02-03 ENCOUNTER — ANESTHESIA EVENT (OUTPATIENT)
Dept: PERIOP | Facility: HOSPITAL | Age: 48
End: 2023-02-03

## 2023-02-03 ENCOUNTER — ANESTHESIA (OUTPATIENT)
Dept: PERIOP | Facility: HOSPITAL | Age: 48
End: 2023-02-03

## 2023-02-03 VITALS
TEMPERATURE: 97.8 F | HEART RATE: 75 BPM | OXYGEN SATURATION: 95 % | SYSTOLIC BLOOD PRESSURE: 157 MMHG | RESPIRATION RATE: 15 BRPM | DIASTOLIC BLOOD PRESSURE: 87 MMHG

## 2023-02-03 DIAGNOSIS — R10.31 RIGHT GROIN PAIN: ICD-10-CM

## 2023-02-03 DIAGNOSIS — K40.20 NON-RECURRENT BILATERAL INGUINAL HERNIA WITHOUT OBSTRUCTION OR GANGRENE: Primary | ICD-10-CM

## 2023-02-03 PROBLEM — Z79.891 LONG-TERM CURRENT USE OF OPIATE ANALGESIC: Status: RESOLVED | Noted: 2018-06-28 | Resolved: 2023-02-03

## 2023-02-03 PROBLEM — G89.4 CHRONIC PAIN SYNDROME: Status: RESOLVED | Noted: 2018-05-02 | Resolved: 2023-02-03

## 2023-02-03 LAB — GLUCOSE SERPL-MCNC: 103 MG/DL (ref 65–140)

## 2023-02-03 DEVICE — LAPAROSCOPIC SELF-FIXATING MESH POLYESTER WITH POLYLACTIC ACID GRIPS AND COLLAGEN FILM
Type: IMPLANTABLE DEVICE | Site: INGUINAL | Status: FUNCTIONAL
Brand: PROGRIP

## 2023-02-03 RX ORDER — HEPARIN SODIUM 5000 [USP'U]/ML
5000 INJECTION, SOLUTION INTRAVENOUS; SUBCUTANEOUS ONCE
Status: COMPLETED | OUTPATIENT
Start: 2023-02-03 | End: 2023-02-03

## 2023-02-03 RX ORDER — SODIUM CHLORIDE, SODIUM LACTATE, POTASSIUM CHLORIDE, CALCIUM CHLORIDE 600; 310; 30; 20 MG/100ML; MG/100ML; MG/100ML; MG/100ML
INJECTION, SOLUTION INTRAVENOUS CONTINUOUS PRN
Status: DISCONTINUED | OUTPATIENT
Start: 2023-02-03 | End: 2023-02-03

## 2023-02-03 RX ORDER — FENTANYL CITRATE/PF 50 MCG/ML
50 SYRINGE (ML) INJECTION
Status: DISCONTINUED | OUTPATIENT
Start: 2023-02-03 | End: 2023-02-03 | Stop reason: HOSPADM

## 2023-02-03 RX ORDER — BUPIVACAINE HYDROCHLORIDE 2.5 MG/ML
INJECTION, SOLUTION EPIDURAL; INFILTRATION; INTRACAUDAL AS NEEDED
Status: DISCONTINUED | OUTPATIENT
Start: 2023-02-03 | End: 2023-02-03 | Stop reason: HOSPADM

## 2023-02-03 RX ORDER — IBUPROFEN 400 MG/1
400 TABLET ORAL EVERY 6 HOURS PRN
Status: DISCONTINUED | OUTPATIENT
Start: 2023-02-03 | End: 2023-02-03 | Stop reason: HOSPADM

## 2023-02-03 RX ORDER — DEXAMETHASONE SODIUM PHOSPHATE 10 MG/ML
INJECTION, SOLUTION INTRAMUSCULAR; INTRAVENOUS AS NEEDED
Status: DISCONTINUED | OUTPATIENT
Start: 2023-02-03 | End: 2023-02-03

## 2023-02-03 RX ORDER — ONDANSETRON 2 MG/ML
INJECTION INTRAMUSCULAR; INTRAVENOUS AS NEEDED
Status: DISCONTINUED | OUTPATIENT
Start: 2023-02-03 | End: 2023-02-03

## 2023-02-03 RX ORDER — LIDOCAINE HYDROCHLORIDE 20 MG/ML
INJECTION, SOLUTION EPIDURAL; INFILTRATION; INTRACAUDAL; PERINEURAL AS NEEDED
Status: DISCONTINUED | OUTPATIENT
Start: 2023-02-03 | End: 2023-02-03

## 2023-02-03 RX ORDER — KETAMINE HCL IN NACL, ISO-OSM 100MG/10ML
SYRINGE (ML) INJECTION AS NEEDED
Status: DISCONTINUED | OUTPATIENT
Start: 2023-02-03 | End: 2023-02-03

## 2023-02-03 RX ORDER — DOCUSATE SODIUM 100 MG/1
100 CAPSULE, LIQUID FILLED ORAL 3 TIMES DAILY PRN
Qty: 30 CAPSULE | Refills: 0 | Status: SHIPPED | OUTPATIENT
Start: 2023-02-03

## 2023-02-03 RX ORDER — MAGNESIUM HYDROXIDE 1200 MG/15ML
LIQUID ORAL AS NEEDED
Status: DISCONTINUED | OUTPATIENT
Start: 2023-02-03 | End: 2023-02-03 | Stop reason: HOSPADM

## 2023-02-03 RX ORDER — FENTANYL CITRATE 50 UG/ML
INJECTION, SOLUTION INTRAMUSCULAR; INTRAVENOUS AS NEEDED
Status: DISCONTINUED | OUTPATIENT
Start: 2023-02-03 | End: 2023-02-03

## 2023-02-03 RX ORDER — TRAMADOL HYDROCHLORIDE 50 MG/1
50 TABLET ORAL EVERY 6 HOURS PRN
Status: DISCONTINUED | OUTPATIENT
Start: 2023-02-03 | End: 2023-02-03 | Stop reason: HOSPADM

## 2023-02-03 RX ORDER — MIDAZOLAM HYDROCHLORIDE 2 MG/2ML
INJECTION, SOLUTION INTRAMUSCULAR; INTRAVENOUS AS NEEDED
Status: DISCONTINUED | OUTPATIENT
Start: 2023-02-03 | End: 2023-02-03

## 2023-02-03 RX ORDER — TRAMADOL HYDROCHLORIDE 50 MG/1
50 TABLET ORAL EVERY 6 HOURS PRN
Qty: 16 TABLET | Refills: 0 | Status: SHIPPED | OUTPATIENT
Start: 2023-02-03 | End: 2023-02-14

## 2023-02-03 RX ORDER — OXYCODONE HYDROCHLORIDE 5 MG/1
5 TABLET ORAL ONCE
Status: COMPLETED | OUTPATIENT
Start: 2023-02-03 | End: 2023-02-03

## 2023-02-03 RX ORDER — CHLORHEXIDINE GLUCONATE 4 G/100ML
SOLUTION TOPICAL DAILY PRN
Status: DISCONTINUED | OUTPATIENT
Start: 2023-02-03 | End: 2023-02-03 | Stop reason: HOSPADM

## 2023-02-03 RX ORDER — HYDROMORPHONE HCL/PF 1 MG/ML
0.5 SYRINGE (ML) INJECTION
Status: COMPLETED | OUTPATIENT
Start: 2023-02-03 | End: 2023-02-03

## 2023-02-03 RX ORDER — ROCURONIUM BROMIDE 10 MG/ML
INJECTION, SOLUTION INTRAVENOUS AS NEEDED
Status: DISCONTINUED | OUTPATIENT
Start: 2023-02-03 | End: 2023-02-03

## 2023-02-03 RX ORDER — CEFAZOLIN SODIUM 2 G/50ML
2000 SOLUTION INTRAVENOUS ONCE
Status: COMPLETED | OUTPATIENT
Start: 2023-02-03 | End: 2023-02-03

## 2023-02-03 RX ORDER — PROPOFOL 10 MG/ML
INJECTION, EMULSION INTRAVENOUS AS NEEDED
Status: DISCONTINUED | OUTPATIENT
Start: 2023-02-03 | End: 2023-02-03

## 2023-02-03 RX ADMIN — CEFAZOLIN SODIUM 2000 MG: 2 SOLUTION INTRAVENOUS at 09:24

## 2023-02-03 RX ADMIN — MIDAZOLAM 2 MG: 1 INJECTION INTRAMUSCULAR; INTRAVENOUS at 09:28

## 2023-02-03 RX ADMIN — HEPARIN SODIUM 5000 UNITS: 5000 INJECTION INTRAVENOUS; SUBCUTANEOUS at 08:20

## 2023-02-03 RX ADMIN — SODIUM CHLORIDE, SODIUM LACTATE, POTASSIUM CHLORIDE, AND CALCIUM CHLORIDE: .6; .31; .03; .02 INJECTION, SOLUTION INTRAVENOUS at 08:12

## 2023-02-03 RX ADMIN — SODIUM CHLORIDE, SODIUM LACTATE, POTASSIUM CHLORIDE, AND CALCIUM CHLORIDE 1000 ML: .6; .31; .03; .02 INJECTION, SOLUTION INTRAVENOUS at 08:19

## 2023-02-03 RX ADMIN — FENTANYL CITRATE 50 MCG: 50 INJECTION, SOLUTION INTRAMUSCULAR; INTRAVENOUS at 11:52

## 2023-02-03 RX ADMIN — FENTANYL CITRATE 100 MCG: 50 INJECTION, SOLUTION INTRAMUSCULAR; INTRAVENOUS at 09:33

## 2023-02-03 RX ADMIN — SUGAMMADEX 200 MG: 100 INJECTION, SOLUTION INTRAVENOUS at 11:24

## 2023-02-03 RX ADMIN — OXYCODONE HYDROCHLORIDE 5 MG: 5 TABLET ORAL at 14:21

## 2023-02-03 RX ADMIN — HYDROMORPHONE HYDROCHLORIDE 0.5 MG: 1 INJECTION, SOLUTION INTRAMUSCULAR; INTRAVENOUS; SUBCUTANEOUS at 12:34

## 2023-02-03 RX ADMIN — ROCURONIUM BROMIDE 30 MG: 10 INJECTION, SOLUTION INTRAVENOUS at 09:51

## 2023-02-03 RX ADMIN — ONDANSETRON 4 MG: 2 INJECTION INTRAMUSCULAR; INTRAVENOUS at 09:34

## 2023-02-03 RX ADMIN — LIDOCAINE HYDROCHLORIDE 100 MG: 20 INJECTION, SOLUTION EPIDURAL; INFILTRATION; INTRACAUDAL at 09:33

## 2023-02-03 RX ADMIN — ROCURONIUM BROMIDE 20 MG: 10 INJECTION, SOLUTION INTRAVENOUS at 10:34

## 2023-02-03 RX ADMIN — HYDROMORPHONE HYDROCHLORIDE 0.5 MG: 1 INJECTION, SOLUTION INTRAMUSCULAR; INTRAVENOUS; SUBCUTANEOUS at 12:19

## 2023-02-03 RX ADMIN — DEXAMETHASONE SODIUM PHOSPHATE 10 MG: 10 INJECTION, SOLUTION INTRAMUSCULAR; INTRAVENOUS at 09:34

## 2023-02-03 RX ADMIN — FENTANYL CITRATE 50 MCG: 50 INJECTION, SOLUTION INTRAMUSCULAR; INTRAVENOUS at 12:03

## 2023-02-03 RX ADMIN — SODIUM CHLORIDE, SODIUM LACTATE, POTASSIUM CHLORIDE, AND CALCIUM CHLORIDE: .6; .31; .03; .02 INJECTION, SOLUTION INTRAVENOUS at 10:55

## 2023-02-03 RX ADMIN — Medication 30 MG: at 09:39

## 2023-02-03 RX ADMIN — PROPOFOL 200 MG: 10 INJECTION, EMULSION INTRAVENOUS at 09:33

## 2023-02-03 RX ADMIN — TRAMADOL HYDROCHLORIDE 50 MG: 50 TABLET, COATED ORAL at 13:30

## 2023-02-03 RX ADMIN — ROCURONIUM BROMIDE 50 MG: 10 INJECTION, SOLUTION INTRAVENOUS at 09:34

## 2023-02-03 RX ADMIN — SODIUM CHLORIDE, SODIUM LACTATE, POTASSIUM CHLORIDE, AND CALCIUM CHLORIDE: .6; .31; .03; .02 INJECTION, SOLUTION INTRAVENOUS at 09:28

## 2023-02-03 NOTE — ANESTHESIA POSTPROCEDURE EVALUATION
Post-Op Assessment Note    CV Status:  Stable  Pain Score: 0    Pain management: adequate     Mental Status:  Alert and awake   Hydration Status:  Euvolemic   PONV Controlled:  Controlled   Airway Patency:  Patent      Post Op Vitals Reviewed: Yes      Staff: Anesthesiologist, CRNA         No notable events documented      /77 (02/03/23 1142)    Temp 99 5 °F (37 5 °C) (02/03/23 1142)    Pulse 74 (02/03/23 1142)   Resp 12 (02/03/23 1142)    SpO2 97 % (02/03/23 1142)

## 2023-02-03 NOTE — OP NOTE
OPERATIVE REPORT  PATIENT NAME: Sandie Oliveira    :  1975  MRN: [id number]  Pt Location: [facility]OM 01    SURGERY DATE: 2/3/2023    Surgeon(s) and Role:     * Mery Diallo, DO - Primary     * Amrik West PA-C - Assisting    Preop Diagnosis:  Right groin pain [R10 31]  Non-recurrent bilateral inguinal hernia without obstruction or gangrene [K40 20]    Post-Op Diagnosis Codes:     * Right groin pain [R10 31]     * Non-recurrent bilateral inguinal hernia without obstruction or gangrene [K40 20]    Procedure(s):  Bilateral - REPAIR HERNIA INGUINAL  LAPAROSCOPIC  Possible Open    Specimen(s):  * No specimens in log *    Estimated Blood Loss:   Minimal    Drains:  * No LDAs found *    Anesthesia Type:   General    Operative Indications:  Right groin pain [R10 31]  Non-recurrent bilateral inguinal hernia without obstruction or gangrene [K40 20]    Operative Findings: There was a small umbilical hernia  There was a right-sided indirect inguinal hernia  There was a left-sided indirect inguinal hernia and left-sided direct inguinal hernia    Complications:   None    Procedure and Technique:  The patient was seen again in Preoperative Holding  All the risks, benefits, complications, treatment options, and expected outcomes were discussed with the patient and family at length  All questions were answered to satisfaction  There was concurrence with the proposed plan and informed consent was obtained  The site of surgery was properly noted/marked  The patient was taken to Operating Room, identified, and the procedure verified as Bilateral laparoscopic inguinal hernia repair with mesh, possible open  The patient was placed in the supine position on the operating room table  The patient had received preoperative antibiotics and SCDs placed on the bilateral lower extremities  Anesthesia was then induced and the patient was intubated      The abdomen was then prepped and draped in the usual sterile fashion using ChloraPrep  A Time-Out was then performed with all involved present confirming the correct patient, procedure, antibiotics, and any additional concerns  A 1 5 centimeter supraumbilical incision was made in a transverse fashion using a #15 blade and the umbilical stalk was grasped and elevated  The patient was noted to have a small umbilical hernia and the umbilicus was elevated off the fascia  Using Kocher clamps the fascial edges were grasped  Using a large blunt Gini clamp the peritoneum was entered under direct visualization  A 11 mm port was then placed in the fascial opening and pneumoperitoneum was established  Initial pressure upon establishing pneumoperitoneum was noted to be low  Two additional 5 mm ports were placed under direct visualization; one on the right side of the abdomen just below the umbilicus in the midclavicular line and one on the left side of the abdomen just below the umbilicus in the midclavicular line  The peritoneum overlying the Right groin was incised and reflected  The patient was noted to have an indirect inguinal hernia containing preperitoneal fat which was removed  The hernia sac was identified and carefully dissected from the cord structures without injuring them  The preperitoneal space was carefully expanded using blunt dissection in preparation for mesh placement  A 15 cm by 10 cm ProGrip mesh was marked for orientation and introduced into the abdomen via the 11 mm port  The mesh was introduced into the preperitoneal space and and spread evenly making sure to cover the hernia defect  The peritoneum was closed with running suture of 0 absorbable V-Loc with the Endo Stitch device  Hemostasis was noted  The peritoneum overlying the Left groin was incised and reflected  The patient was noted to have an indirect and direct inguinal hernia both containing preperitoneal fat which was reduced and removed    The hernia sac was identified and carefully dissected from the cord structures without injuring them  The preperitoneal space was carefully expanded using blunt dissection in preparation for mesh placement  A 15 cm by 10 cm ProGrip mesh was marked for orientation and introduced into the abdomen via the 11 mm port  The mesh was introduced into the preperitoneal space and and spread evenly making sure to cover the hernia defect  The peritoneum was closed with running suture of 0 absorbable V-Loc with the Endo Stitch device  Hemostasis was noted  The abdomen was desufflated and the umbilical fascial defect was closed with 0 Vicryl in an interrupted fashion using 5 stitches  The abdomen was then reinsufflated and the fascial incision was inspected and noted to be hemostatic without any insufflation leakage  The remaining 5 mm ports were removed and the abdomen was desufflated  Local anesthesia infiltrated in the port sites using 0 25% Marcaine  The port sites were then closed using 4-0 Monocryl  The abdomen was then cleansed and dried and Steri-Strips, 2 x 2, Tegaderm were used to cover the sites  All instrument, sponge, and needle counts were noted to be correct at the conclusion of the case  The patient was brought to the PACU in stable and satisfactory condition       I was present for the entire procedure, A qualified resident physician was not available and A physician assistant was required during the procedure for retraction tissue handling,dissection and suturing    Patient Disposition:  extubated and stable        SIGNATURE: David Sotelo DO  DATE: February 3, 2023  TIME: 11:27 AM

## 2023-02-03 NOTE — DISCHARGE INSTR - AVS FIRST PAGE
Your incisions are covered with Steri-Strips, 4 x 4 gauze, Tegaderm  In 2 days you may remove your dressing, the Steri-Strips will remain on your skin but the 4 x 4 gauze and Tegaderm can be removed  The Steri-Strips will fall off on their own  After your dressings are removed you may shower  Do not soak your incisions including tub baths or swimming  You may shower and let water and soap wash over incisions  Do not scrub your incisions  No heavy lifting greater than 15 lb for 4 weeks or until cleared by your Surgeon  You may resume your normal diet as tolerated  Do not make any important decisions and do not drive while taking narcotic pain medication  You are prescribed Tramadol for severe pain  Take only as needed  Take Colace to soften your stool while taking narcotic pain medication  You may take Ibuprofen over-the-counter as needed for pain, follow instructions on the bottle  Follow-up with your Surgeon in 2 weeks, call the office for an appointment  You will receive a survey via Email in regards to your same day surgery experience  Please fill out the survey to let us know how we did with your care

## 2023-02-03 NOTE — INTERVAL H&P NOTE
H&P reviewed  After examining the patient I find no changes in the patients condition since the H&P had been written      Vitals:    02/03/23 0804   BP: 139/78   Pulse: 69   Resp: 18   Temp: 98 9 °F (37 2 °C)   SpO2: 95%

## 2023-02-03 NOTE — DISCHARGE INSTRUCTIONS
Inguinal Hernia Repair   WHAT YOU NEED TO KNOW:   An inguinal hernia repair may be done open or laparoscopically  Open means your healthcare provider will make 1 large incision and fix your hernia  Laparoscopically means he will make 2 to 3 small incisions and fix your hernia  DISCHARGE INSTRUCTIONS:   Call 911 for any of the following: You feel lightheaded, short of breath, and have chest pain  You cough up blood  You have trouble breathing  Seek care immediately if:   Your arm or leg feels warm, tender, and painful  It may look swollen and red  Blood soaks through your bandage  Your abdomen or groin feels hard and looks bigger than usual      Your bowel movements are black, bloody, or tarry-looking  Contact your healthcare provider if:   You have a fever above 101°F      You develop a skin rash, hives, or itching  Your incision is swollen, red, or draining pus or fluid  You have nausea, or you are vomiting  You cannot have a bowel movement  You have trouble urinating  Your pain does not get better after you take pain medicine  You have questions or concerns about your condition or care  Medicines: You may need any of the following:  Prescription pain medicine  may be given  Ask your healthcare provider how to take this medicine safely  Some prescription pain medicines contain acetaminophen  Do not take other medicines that contain acetaminophen without talking to your healthcare provider  Too much acetaminophen may cause liver damage  Prescription pain medicine may cause constipation  Ask your healthcare provider how to prevent or treat constipation  NSAIDs , such as ibuprofen, help decrease swelling, pain, and fever  This medicine is available with or without a doctor's order  NSAIDs can cause stomach bleeding or kidney problems in certain people  If you take blood thinner medicine, always ask your healthcare provider if NSAIDs are safe for you   Always read the medicine label and follow directions  Acetaminophen  decreases pain and fever  It is available without a doctor's order  Ask how much to take and how often to take it  Follow directions  Read the labels of all other medicines you are using to see if they also contain acetaminophen, or ask your doctor or pharmacist  Acetaminophen can cause liver damage if not taken correctly  Do not use more than 4 grams (4,000 milligrams) total of acetaminophen in one day  Take your medicine as directed  Contact your healthcare provider if you think your medicine is not helping or if you have side effects  Tell him or her if you are allergic to any medicine  Keep a list of the medicines, vitamins, and herbs you take  Include the amounts, and when and why you take them  Bring the list or the pill bottles to follow-up visits  Carry your medicine list with you in case of an emergency  Care for your wound as directed: You can shower in 48 hours  Remove your bandage before you shower  It is normal to see a small amount of blood under the bandage  Carefully wash around your wound  It is okay to let soap and water run over your wound  Do not  scrub your wound  Gently pay your wound dry  If you have strips of medical tape over your incision, allow them to fall off on their own  It may take 7 to 10 days for them to fall off  Do not get in a bathtub, swimming pool, or hot tub until your healthcare provider says it is okay  Self-care:   Eat a variety of healthy foods  Healthy foods include fruits, vegetables, whole-grain breads, low-fat dairy products, beans, lean meats, and fish  Healthy foods may help you heal faster  Ask if you need to be on a special diet  Drink liquids as directed  Liquids may prevent constipation and straining during a bowel movement  This will help prevent pressure on your incision, and prevent another hernia  Ask how much liquid to drink each day and which liquids are best for you       Apply ice on your incision for 15 to 20 minutes every hour or as directed  Use an ice pack, or put crushed ice in a plastic bag  Cover it with a towel  Ice helps prevent tissue damage and decreases swelling and pain  Take deep breaths and cough  10 times each hour  This will decrease your risk for a lung infection  Take a deep breath and hold it for as long as you can  Let the air out and then cough strongly  Deep breaths help open your airway  You may be given an incentive spirometer to help you take deep breaths  Put the plastic piece in your mouth and take a slow, deep breath  Then let the air out and cough  Repeat these steps 10 times every hour  Press a pillow lightly against your incision when you cough  This may decrease pain or discomfort  Do not smoke  Nicotine and other chemicals in cigarettes and cigars can prevent your wound from healing  It can also increase your risk for another inguinal hernia  Ask your healthcare provider for information if you currently smoke and need help to quit  E-cigarettes or smokeless tobacco still contain nicotine  Talk to your healthcare provider before you use these products  Driving:  Do not drive for at least 1 week after surgery  Do not drive if you are taking prescription pain medication  Do not drive until it is comfortable to wear a seatbelt across your abdomen  Ask your healthcare provider when it is safe for you to drive  Activity: It is important to get out of bed and walk the day after your surgery  This will help prevent blood clots, move your bowels after surgery, and increase healing  Do not lift anything heavy until your healthcare provider says it is okay  This may put too much pressure on your incision and cause it to come apart  It may also increase your risk for another hernia  Do not play sports for 2 to 3 weeks  Ask your healthcare provider when you can return to work, school, and your normal activities     Follow up with your healthcare provider as directed:  Write down your questions so you remember to ask them during your visits  © Copyright [employer] 2022 Information is for End User's use only and may not be sold, redistributed or otherwise used for commercial purposes  All illustrations and images included in CareNotes® are the copyrighted property of A D A M [employer] Inc  or Kady Estrada  The above information is an  only  It is not intended as medical advice for individual conditions or treatments  Talk to your doctor, nurse or pharmacist before following any medical regimen to see if it is safe and effective for you

## 2023-02-03 NOTE — ANESTHESIA PREPROCEDURE EVALUATION
Procedure:  REPAIR HERNIA INGUINAL, LAPAROSCOPIC, Possible Open (Bilateral: Groin)    Relevant Problems   CARDIO   (+) Hypertension   (+) Hypertriglyceridemia   (+) RBBB      GI/HEPATIC   (+) Chronic pancreatitis ([address] ) (last flare 2021, very elevated triglycerides)   (+) GERD (gastroesophageal reflux disease)   (+) Liver abscess      MUSCULOSKELETAL   (+) Cervical spondylosis      NEURO/PSYCH   (+) Chronic pain syndrome (Resolved) (received celiac plexus block with complete resolution of pain)      Nervous and Auditory   (+) Tremor, essential      Other   (+) Abnormal transaminases (chronically elevated)   (+) Prediabetes   (+) Right groin pain      EKG 1/24/23:  NSR, HR 93,     CT A/P with contrast 8/3/22: IMPRESSION:     No evidence of acute abdominopelvic process      No acute fracture      Chronic findings as above including stable hepatomegaly with features suggestive of cirrhosis and portal hypertension  Physical Exam    Airway    Mallampati score: II  TM Distance: <3 FB  Neck ROM: full     Dental       Cardiovascular      Pulmonary      Other Findings        Anesthesia Plan  ASA Score- 3     Anesthesia Type- general with ASA Monitors  Additional Monitors:   Airway Plan: ETT      Comment: Recent labs personally reviewed:  Lab Results       Component                Value               Date                       WBC                      6 4                 01/23/2023                 HGB                      14 1                01/23/2023                 PLT                      114 (L)             01/23/2023            Lab Results       Component                Value               Date                       NA                       140                 04/26/2017                 K                        4 2                 01/23/2023                 BUN                      14                  01/23/2023                 CREATININE               0 61                01/23/2023            Lab Results       Component                Value               Date                       PTT                      36                  05/14/2020             Lab Results       Component                Value               Date                       INR                      1 1                 01/23/2023              Blood type       I, Luis Manuel Segura MD, have personally seen and evaluated the patient prior to anesthetic care  I have reviewed the pre-anesthetic record, medical history, allergies, medications and any other medical records if appropriate to the anesthetic care  If a CRNA is involved in the case, I have reviewed the CRNA assessment, if present, and agree  I consented the patient for general anesthesia with appropriate airway support as indicated  We reviewed the risks associated including PONV, sore throat, allergic reaction to anesthetics and management plan to address these issues  We discussed the indication and risks associated with any invasive monitors that would be placed  We discussed post op pain control and expectations  We discussed rare complications including hypoxia, perioperative cardiac and neurologic events, and death based on the patient's baseline risk  All questions and concerns were addressed          Plan Factors-Exercise tolerance (METS): >4 METS  Chart reviewed  EKG reviewed  Imaging results reviewed  Existing labs reviewed  Patient summary reviewed  Patient is not a current smoker  Patient did not smoke on day of surgery  Obstructive sleep apnea risk education given perioperatively  Induction- intravenous  Postoperative Plan- Plan for postoperative opioid use  Informed Consent- Anesthetic plan and risks discussed with patient  I personally reviewed this patient with the CRNA  Discussed and agreed on the Anesthesia Plan with the CRNA  Yesica Amato

## 2023-02-05 ENCOUNTER — NURSE TRIAGE (OUTPATIENT)
Dept: OTHER | Facility: OTHER | Age: 48
End: 2023-02-05

## 2023-02-05 LAB — GLUCOSE SERPL-MCNC: 128 MG/DL (ref 65–140)

## 2023-02-06 ENCOUNTER — APPOINTMENT (EMERGENCY)
Dept: CT IMAGING | Facility: HOSPITAL | Age: 48
End: 2023-02-06

## 2023-02-06 ENCOUNTER — HOSPITAL ENCOUNTER (INPATIENT)
Facility: HOSPITAL | Age: 48
LOS: 6 days | Discharge: HOME | End: 2023-02-14
Attending: EMERGENCY MEDICINE | Admitting: SURGERY

## 2023-02-06 ENCOUNTER — NURSE TRIAGE (OUTPATIENT)
Dept: OTHER | Facility: OTHER | Age: 48
End: 2023-02-06

## 2023-02-06 DIAGNOSIS — K59.03 DRUG-INDUCED CONSTIPATION: ICD-10-CM

## 2023-02-06 DIAGNOSIS — T81.9XXA POST-OPERATIVE COMPLICATION: ICD-10-CM

## 2023-02-06 DIAGNOSIS — K59.39 DILATATION OF COLON: Primary | ICD-10-CM

## 2023-02-06 DIAGNOSIS — K40.20 NON-RECURRENT BILATERAL INGUINAL HERNIA WITHOUT OBSTRUCTION OR GANGRENE: ICD-10-CM

## 2023-02-06 DIAGNOSIS — R10.31 RIGHT GROIN PAIN: ICD-10-CM

## 2023-02-06 DIAGNOSIS — K59.09 OTHER CONSTIPATION: ICD-10-CM

## 2023-02-06 DIAGNOSIS — I10 PRIMARY HYPERTENSION: ICD-10-CM

## 2023-02-06 LAB
ALBUMIN SERPL BCP-MCNC: 3.7 G/DL (ref 3.5–5)
ALP SERPL-CCNC: 188 U/L (ref 46–116)
ALT SERPL W P-5'-P-CCNC: 93 U/L (ref 12–78)
ANION GAP SERPL CALCULATED.3IONS-SCNC: 8 MMOL/L (ref 4–13)
AST SERPL W P-5'-P-CCNC: 104 U/L (ref 5–45)
BASOPHILS # BLD AUTO: 0.04 THOUSANDS/ÂΜL (ref 0–0.1)
BASOPHILS NFR BLD AUTO: 0 % (ref 0–1)
BILIRUB SERPL-MCNC: 2.18 MG/DL (ref 0.2–1)
BUN SERPL-MCNC: 16 MG/DL (ref 5–25)
CALCIUM SERPL-MCNC: 8.9 MG/DL (ref 8.3–10.1)
CHLORIDE SERPL-SCNC: 96 MMOL/L (ref 96–108)
CO2 SERPL-SCNC: 28 MMOL/L (ref 21–32)
CREAT SERPL-MCNC: 0.58 MG/DL (ref 0.6–1.3)
EOSINOPHIL # BLD AUTO: 0.11 THOUSAND/ÂΜL (ref 0–0.61)
EOSINOPHIL NFR BLD AUTO: 1 % (ref 0–6)
ERYTHROCYTE [DISTWIDTH] IN BLOOD BY AUTOMATED COUNT: 12.6 % (ref 11.6–15.1)
GFR SERPL CREATININE-BSD FRML MDRD: 119 ML/MIN/1.73SQ M
GLUCOSE SERPL-MCNC: 97 MG/DL (ref 65–140)
HCT VFR BLD AUTO: 44.9 % (ref 36.5–49.3)
HGB BLD-MCNC: 15.2 G/DL (ref 12–17)
IMM GRANULOCYTES # BLD AUTO: 0.04 THOUSAND/UL (ref 0–0.2)
IMM GRANULOCYTES NFR BLD AUTO: 0 % (ref 0–2)
LACTATE SERPL-SCNC: 0.8 MMOL/L (ref 0.5–2)
LIPASE SERPL-CCNC: 68 U/L (ref 73–393)
LYMPHOCYTES # BLD AUTO: 2.1 THOUSANDS/ÂΜL (ref 0.6–4.47)
LYMPHOCYTES NFR BLD AUTO: 20 % (ref 14–44)
MCH RBC QN AUTO: 33.8 PG (ref 26.8–34.3)
MCHC RBC AUTO-ENTMCNC: 33.9 G/DL (ref 31.4–37.4)
MCV RBC AUTO: 100 FL (ref 82–98)
MONOCYTES # BLD AUTO: 0.98 THOUSAND/ÂΜL (ref 0.17–1.22)
MONOCYTES NFR BLD AUTO: 10 % (ref 4–12)
NEUTROPHILS # BLD AUTO: 7.1 THOUSANDS/ÂΜL (ref 1.85–7.62)
NEUTS SEG NFR BLD AUTO: 69 % (ref 43–75)
NRBC BLD AUTO-RTO: 0 /100 WBCS
PLATELET # BLD AUTO: 143 THOUSANDS/UL (ref 149–390)
PMV BLD AUTO: 9.5 FL (ref 8.9–12.7)
POTASSIUM SERPL-SCNC: 3.6 MMOL/L (ref 3.5–5.3)
PROT SERPL-MCNC: 7.4 G/DL (ref 6.4–8.4)
RBC # BLD AUTO: 4.5 MILLION/UL (ref 3.88–5.62)
SODIUM SERPL-SCNC: 132 MMOL/L (ref 135–147)
WBC # BLD AUTO: 10.37 THOUSAND/UL (ref 4.31–10.16)

## 2023-02-06 RX ORDER — ONDANSETRON 2 MG/ML
4 INJECTION INTRAMUSCULAR; INTRAVENOUS EVERY 6 HOURS PRN
Status: DISCONTINUED | OUTPATIENT
Start: 2023-02-06 | End: 2023-02-14 | Stop reason: HOSPADM

## 2023-02-06 RX ORDER — MORPHINE SULFATE 4 MG/ML
4 INJECTION, SOLUTION INTRAMUSCULAR; INTRAVENOUS ONCE
Status: COMPLETED | OUTPATIENT
Start: 2023-02-06 | End: 2023-02-06

## 2023-02-06 RX ORDER — DEXTROSE, SODIUM CHLORIDE, AND POTASSIUM CHLORIDE 5; .45; .15 G/100ML; G/100ML; G/100ML
125 INJECTION INTRAVENOUS CONTINUOUS
Status: DISCONTINUED | OUTPATIENT
Start: 2023-02-06 | End: 2023-02-09

## 2023-02-06 RX ORDER — FAMOTIDINE 10 MG/ML
20 INJECTION, SOLUTION INTRAVENOUS EVERY 12 HOURS SCHEDULED
Status: DISCONTINUED | OUTPATIENT
Start: 2023-02-06 | End: 2023-02-14

## 2023-02-06 RX ORDER — ALBUMIN, HUMAN INJ 5% 5 %
12.5 SOLUTION INTRAVENOUS ONCE
Status: DISCONTINUED | OUTPATIENT
Start: 2023-02-06 | End: 2023-02-06

## 2023-02-06 RX ORDER — HEPARIN SODIUM 5000 [USP'U]/ML
5000 INJECTION, SOLUTION INTRAVENOUS; SUBCUTANEOUS EVERY 8 HOURS SCHEDULED
Status: DISCONTINUED | OUTPATIENT
Start: 2023-02-06 | End: 2023-02-14 | Stop reason: HOSPADM

## 2023-02-06 RX ADMIN — IOHEXOL 100 ML: 350 INJECTION, SOLUTION INTRAVENOUS at 21:58

## 2023-02-06 RX ADMIN — MORPHINE SULFATE 4 MG: 4 INJECTION INTRAVENOUS at 21:13

## 2023-02-06 RX ADMIN — SODIUM CHLORIDE 1000 ML: 0.9 INJECTION, SOLUTION INTRAVENOUS at 21:13

## 2023-02-06 NOTE — TELEPHONE ENCOUNTER
Wife Faby Shelton called stating that pt has not had a bm yet  Pt had two doses of miralax yesterday 2/5 and one today  I informed her to try enema as the stool can harden and after that continue miralax and colace, I also informed her she could try magnesium citrate however, that can make him nauseous and per maria fernanda pt doesn't have an appetite  Also informed her to watch for fever 101 or above and any vomiting  Per maria fernanda incisions look good is just the pain in his lower ab from surgery and constipation       She verbally understood, inform her to call back if things done change by tomorrow or go to ed if things get worse

## 2023-02-06 NOTE — TELEPHONE ENCOUNTER
Regarding:  had surgery now having sharp lower pain from surgical site thats radiating from hip to hip  ----- Message from Dre Amaro sent at 2/5/2023  9:02 PM EST -----  '' My  had surgery on 2/3, he's having sharp lower pain from the surgical site that is radiating from hip to hip, his stomach is extended ''

## 2023-02-06 NOTE — LETTER
[address] [facility] FLOOR MED SURG UNIT  [address] [facility] [address] [place] [address]  Dept: [phone]    February 14, 2023     Patient: Janay Arredondo   YOB: 1975   Date of Visit: 2/6/2023       To Whom it May Concern:    Janay Arredondo is under my professional care  He was seen in the hospital from 2/6/2023 to 02/14/23  He may start physical therapy upon discharge with the following limitations: No strenuous exercise or lifting more than 10 lbs as patient had recent hernia repair  Patient will have a follow up visit 2/21/2023 and will reassess limitations then  If you have any questions or concerns, please don't hesitate to call           Sincerely,          Edwar Crespo PA-C

## 2023-02-06 NOTE — TELEPHONE ENCOUNTER
Reason for Disposition  • [1] SEVERE pain (e g , excruciating) AND [2] present > 1 hour    Additional Information  • Constipation  • [1] Abdomen pain is main symptom AND [2] male    Answer Assessment - Initial Assessment Questions  1  SYMPTOM: "What's the main symptom you're concerned about?" (e g , pain, fever, vomiting)      Sharp shooting pain across lower abdomen, more on left side  Pt feels lower part of abdomen is swollen  Constipation- last BM on 2/2- feels like he needs to have a bowel movement but cannot  Taking MiraLAX, last dose at noon today  2  ONSET: "When did this start?"      Started after surgery, has not subsided     3  SURGERY: "What surgery was performed?"      laparascopic inguinal hernia repair x 3 and one umbilical hernia repair    4  DATE of SURGERY: "When was surgery performed?"       2/3/23    5  ANESTHESIA: " What type of anesthesia did you have?" (e g , general, spinal, epidural, local)      General     6  PAIN: "Is there any pain?" If Yes, ask: "How bad is it?"  (Scale 1-10; or mild, moderate, severe)       Yes, across lower abd and groin areas  Pain severe and intermittent  7  FEVER: "Do you have a fever?" If Yes, ask: "What is your temperature, how was it measured, and when did it start?"      Denies     8  VOMITING: "Is there any vomiting?" If yes, ask: "How many times?"      Denies     9  BLEEDING: "Is there any bleeding?" If Yes, ask: "How much?" and "Where?"      Denies     10  OTHER SYMPTOMS: "Do you have any other symptoms?" (e g , drainage from wound, painful urination, constipation)       Denies any other symptoms at this time, no fevers  Incisions clean, dry, intact  Contacted on-call provider who recommends taking MiraLAX twice daily, continuing Colace, tramadol, Tylenol, and trying to have a bowel movement  If patient has bowel movement in pain is still present, he should be evaluated  Contacted wife to inform her of on-call provider's recommendations  She reported that they never picked up the Colace at the pharmacy, so patient has not been taking that  She will give him an extra dose of MiraLAX tonight and some gas tablets that she has on hand and  the Colace first thing in the morning  ER precautions reviewed with understanding      Protocols used: ABDOMINAL PAIN - MALE-ADULT-AH, POST-OP SYMPTOMS AND QUESTIONS-ADULT-AH, CONSTIPATION-ADULT-AH

## 2023-02-07 ENCOUNTER — APPOINTMENT (OUTPATIENT)
Dept: RADIOLOGY | Facility: HOSPITAL | Age: 48
End: 2023-02-07

## 2023-02-07 PROBLEM — K59.00 CONSTIPATION: Status: ACTIVE | Noted: 2023-02-07

## 2023-02-07 LAB
ALBUMIN SERPL BCP-MCNC: 3.2 G/DL (ref 3.5–5)
ALP SERPL-CCNC: 148 U/L (ref 46–116)
ALT SERPL W P-5'-P-CCNC: 73 U/L (ref 12–78)
ANION GAP SERPL CALCULATED.3IONS-SCNC: 7 MMOL/L (ref 4–13)
AST SERPL W P-5'-P-CCNC: 66 U/L (ref 5–45)
BACTERIA UR QL AUTO: NORMAL /HPF
BASOPHILS # BLD AUTO: 0.04 THOUSANDS/ÂΜL (ref 0–0.1)
BASOPHILS NFR BLD AUTO: 1 % (ref 0–1)
BILIRUB SERPL-MCNC: 1.72 MG/DL (ref 0.2–1)
BILIRUB UR QL STRIP: NEGATIVE
BUN SERPL-MCNC: 13 MG/DL (ref 5–25)
CALCIUM ALBUM COR SERPL-MCNC: 8.7 MG/DL (ref 8.3–10.1)
CALCIUM SERPL-MCNC: 8.1 MG/DL (ref 8.3–10.1)
CHLORIDE SERPL-SCNC: 99 MMOL/L (ref 96–108)
CLARITY UR: CLEAR
CO2 SERPL-SCNC: 28 MMOL/L (ref 21–32)
COLOR UR: YELLOW
CREAT SERPL-MCNC: 0.5 MG/DL (ref 0.6–1.3)
EOSINOPHIL # BLD AUTO: 0.08 THOUSAND/ÂΜL (ref 0–0.61)
EOSINOPHIL NFR BLD AUTO: 1 % (ref 0–6)
ERYTHROCYTE [DISTWIDTH] IN BLOOD BY AUTOMATED COUNT: 12.8 % (ref 11.6–15.1)
GFR SERPL CREATININE-BSD FRML MDRD: 127 ML/MIN/1.73SQ M
GLUCOSE SERPL-MCNC: 95 MG/DL (ref 65–140)
GLUCOSE UR STRIP-MCNC: ABNORMAL MG/DL
HCT VFR BLD AUTO: 38.8 % (ref 36.5–49.3)
HGB BLD-MCNC: 13.1 G/DL (ref 12–17)
HGB UR QL STRIP.AUTO: NEGATIVE
IMM GRANULOCYTES # BLD AUTO: 0.02 THOUSAND/UL (ref 0–0.2)
IMM GRANULOCYTES NFR BLD AUTO: 0 % (ref 0–2)
KETONES UR STRIP-MCNC: ABNORMAL MG/DL
LEUKOCYTE ESTERASE UR QL STRIP: NEGATIVE
LYMPHOCYTES # BLD AUTO: 2.31 THOUSANDS/ÂΜL (ref 0.6–4.47)
LYMPHOCYTES NFR BLD AUTO: 34 % (ref 14–44)
MAGNESIUM SERPL-MCNC: 2.4 MG/DL (ref 1.6–2.6)
MCH RBC QN AUTO: 34 PG (ref 26.8–34.3)
MCHC RBC AUTO-ENTMCNC: 33.8 G/DL (ref 31.4–37.4)
MCV RBC AUTO: 101 FL (ref 82–98)
MONOCYTES # BLD AUTO: 0.67 THOUSAND/ÂΜL (ref 0.17–1.22)
MONOCYTES NFR BLD AUTO: 10 % (ref 4–12)
NEUTROPHILS # BLD AUTO: 3.61 THOUSANDS/ÂΜL (ref 1.85–7.62)
NEUTS SEG NFR BLD AUTO: 54 % (ref 43–75)
NITRITE UR QL STRIP: NEGATIVE
NON-SQ EPI CELLS URNS QL MICRO: NORMAL /HPF
NRBC BLD AUTO-RTO: 0 /100 WBCS
PH UR STRIP.AUTO: 7 [PH]
PHOSPHATE SERPL-MCNC: 2.9 MG/DL (ref 2.7–4.5)
PLATELET # BLD AUTO: 102 THOUSANDS/UL (ref 149–390)
PLATELET # BLD AUTO: 119 THOUSANDS/UL (ref 149–390)
PMV BLD AUTO: 10 FL (ref 8.9–12.7)
PMV BLD AUTO: 9.8 FL (ref 8.9–12.7)
POTASSIUM SERPL-SCNC: 3.7 MMOL/L (ref 3.5–5.3)
PROT SERPL-MCNC: 6.3 G/DL (ref 6.4–8.4)
PROT UR STRIP-MCNC: ABNORMAL MG/DL
RBC # BLD AUTO: 3.85 MILLION/UL (ref 3.88–5.62)
RBC #/AREA URNS AUTO: NORMAL /HPF
SODIUM SERPL-SCNC: 134 MMOL/L (ref 135–147)
SP GR UR STRIP.AUTO: >=1.05 (ref 1–1.03)
UROBILINOGEN UR STRIP-ACNC: <2 MG/DL
WBC # BLD AUTO: 6.73 THOUSAND/UL (ref 4.31–10.16)
WBC #/AREA URNS AUTO: NORMAL /HPF

## 2023-02-07 RX ORDER — HYDROMORPHONE HCL/PF 1 MG/ML
0.5 SYRINGE (ML) INJECTION EVERY 4 HOURS PRN
Status: DISCONTINUED | OUTPATIENT
Start: 2023-02-07 | End: 2023-02-14 | Stop reason: HOSPADM

## 2023-02-07 RX ORDER — METOCLOPRAMIDE HYDROCHLORIDE 5 MG/ML
10 INJECTION INTRAMUSCULAR; INTRAVENOUS EVERY 6 HOURS PRN
Status: DISCONTINUED | OUTPATIENT
Start: 2023-02-07 | End: 2023-02-08

## 2023-02-07 RX ORDER — ACETAMINOPHEN 325 MG/1
650 TABLET ORAL EVERY 6 HOURS PRN
Status: DISCONTINUED | OUTPATIENT
Start: 2023-02-07 | End: 2023-02-14 | Stop reason: HOSPADM

## 2023-02-07 RX ORDER — OXYCODONE HYDROCHLORIDE 5 MG/1
5 TABLET ORAL EVERY 4 HOURS PRN
Status: DISCONTINUED | OUTPATIENT
Start: 2023-02-07 | End: 2023-02-14 | Stop reason: HOSPADM

## 2023-02-07 RX ORDER — PANTOPRAZOLE SODIUM 40 MG/10ML
40 INJECTION, POWDER, LYOPHILIZED, FOR SOLUTION INTRAVENOUS
Status: DISCONTINUED | OUTPATIENT
Start: 2023-02-07 | End: 2023-02-07

## 2023-02-07 RX ORDER — OXYCODONE HYDROCHLORIDE 10 MG/1
10 TABLET ORAL EVERY 4 HOURS PRN
Status: DISCONTINUED | OUTPATIENT
Start: 2023-02-07 | End: 2023-02-14 | Stop reason: HOSPADM

## 2023-02-07 RX ADMIN — HEPARIN SODIUM 5000 UNITS: 5000 INJECTION INTRAVENOUS; SUBCUTANEOUS at 14:14

## 2023-02-07 RX ADMIN — DEXTROSE, SODIUM CHLORIDE, AND POTASSIUM CHLORIDE 125 ML/HR: 5; .45; .15 INJECTION INTRAVENOUS at 07:42

## 2023-02-07 RX ADMIN — OXYCODONE HYDROCHLORIDE 10 MG: 10 TABLET ORAL at 21:51

## 2023-02-07 RX ADMIN — MORPHINE SULFATE 2 MG: 2 INJECTION, SOLUTION INTRAMUSCULAR; INTRAVENOUS at 07:24

## 2023-02-07 RX ADMIN — HEPARIN SODIUM 5000 UNITS: 5000 INJECTION INTRAVENOUS; SUBCUTANEOUS at 21:52

## 2023-02-07 RX ADMIN — FAMOTIDINE 20 MG: 10 INJECTION, SOLUTION INTRAVENOUS at 00:07

## 2023-02-07 RX ADMIN — MORPHINE SULFATE 2 MG: 2 INJECTION, SOLUTION INTRAMUSCULAR; INTRAVENOUS at 10:22

## 2023-02-07 RX ADMIN — HYDROMORPHONE HYDROCHLORIDE 0.5 MG: 1 INJECTION, SOLUTION INTRAMUSCULAR; INTRAVENOUS; SUBCUTANEOUS at 19:31

## 2023-02-07 RX ADMIN — HEPARIN SODIUM 5000 UNITS: 5000 INJECTION INTRAVENOUS; SUBCUTANEOUS at 06:58

## 2023-02-07 RX ADMIN — DEXTROSE, SODIUM CHLORIDE, AND POTASSIUM CHLORIDE 125 ML/HR: 5; .45; .15 INJECTION INTRAVENOUS at 16:20

## 2023-02-07 RX ADMIN — HEPARIN SODIUM 5000 UNITS: 5000 INJECTION INTRAVENOUS; SUBCUTANEOUS at 00:11

## 2023-02-07 RX ADMIN — OXYCODONE HYDROCHLORIDE 10 MG: 10 TABLET ORAL at 16:19

## 2023-02-07 RX ADMIN — FAMOTIDINE 20 MG: 10 INJECTION, SOLUTION INTRAVENOUS at 21:52

## 2023-02-07 RX ADMIN — DEXTROSE, SODIUM CHLORIDE, AND POTASSIUM CHLORIDE 125 ML/HR: 5; .45; .15 INJECTION INTRAVENOUS at 00:13

## 2023-02-07 RX ADMIN — HYDROMORPHONE HYDROCHLORIDE 0.5 MG: 1 INJECTION, SOLUTION INTRAMUSCULAR; INTRAVENOUS; SUBCUTANEOUS at 13:15

## 2023-02-07 RX ADMIN — MORPHINE SULFATE 2 MG: 2 INJECTION, SOLUTION INTRAMUSCULAR; INTRAVENOUS at 00:09

## 2023-02-07 RX ADMIN — FAMOTIDINE 20 MG: 10 INJECTION, SOLUTION INTRAVENOUS at 10:22

## 2023-02-07 RX ADMIN — MORPHINE SULFATE 2 MG: 2 INJECTION, SOLUTION INTRAMUSCULAR; INTRAVENOUS at 12:00

## 2023-02-07 RX ADMIN — MORPHINE SULFATE 2 MG: 2 INJECTION, SOLUTION INTRAMUSCULAR; INTRAVENOUS at 04:22

## 2023-02-07 NOTE — TELEPHONE ENCOUNTER
Reason for Disposition  • [1] Caller has URGENT question AND [2] triager unable to answer question    Answer Assessment - Initial Assessment Questions  1  SYMPTOM: "What's the main symptom you're concerned about?" (e g , pain, fever, vomiting)      Constipation-last BM 2/2/23-stomach distended    2  ONSET: "When did S/S  start?"    2/2/23    3  SURGERY: "What surgery was performed?"      Inguinal hernia     4  DATE of SURGERY: "When was surgery performed?"       2/3/23    5  ANESTHESIA: " What type of anesthesia did you have?" (e g , general, spinal, epidural, local)      General     6  PAIN: "Is there any pain?" If Yes, ask: "How bad is it?"  (Scale 1-10; or mild, moderate, severe)    Lower abdominal pain 7/10, severe at times     7  FEVER: "Do you have a fever?" If Yes, ask: "What is your temperature, how was it measured, and when did it start?"     Denies    8  VOMITING: "Is there any vomiting?" If yes, ask: "How many times?"     Denies    9  BLEEDING: "Is there any bleeding?" If Yes, ask: "How much?" and "Where?"     Denies    10   OTHER SYMPTOMS: "Do you have any other symptoms?" (e g , drainage from wound, painful urination, constipation)       Clammy and cold     Mag Citrate at 1645  Two doses of Miralax today  Colace this morning  Gas pill today  Fiber gummies today   Tramadol at 1200  Tylenol at 1000 mg   1400    Protocols used: POST-OP SYMPTOMS AND QUESTIONS-Critical access hospital

## 2023-02-07 NOTE — UTILIZATION REVIEW
Initial Clinical Review    Observation 2/6 2328 changed to inpatient 2/8 1426  Pt requiring continued stay for management of ileus including IV fluids and decompression  Admission: Date/Time/Statement:   Admission Orders (From admission, onward)     Ordered        02/08/23 1426  Inpatient Admission  Once            02/06/23 2328  Place in Observation  Once                      Orders Placed This Encounter   Procedures   • Inpatient Admission     Standing Status:   Standing     Number of Occurrences:   1     Order Specific Question:   Level of Care     Answer:   Med Surg [16]     Order Specific Question:   Estimated length of stay     Answer:   More than 2 Midnights     Order Specific Question:   Certification     Answer:   I certify that inpatient services are medically necessary for this patient for a duration of greater than two midnights  See H&P and MD Progress Notes for additional information about the patient's course of treatment  ED Arrival Information     Expected   2/6/2023 20:00    Arrival   2/6/2023 20:13    Acuity   Urgent            Means of arrival   Walk-In    Escorted by   Spouse    Service   Surgery-General    Admission type   Emergency            Arrival complaint   Post-op Problem           Chief Complaint   Patient presents with   • Constipation     Hernia sx on 2/3, no BM since  Has trialed otc stool softeners and laxatives w/o success  Increased pain, abdominal distention  Initial Presentation: 50 y o  male with a past medical history of asthma, GERD, hyperlipidemia, hypertension who presents with increasing abdominal pain and distention over the last 4 days  The patient had an elective laparoscopic bilateral inguinal hernia repair on 2/3  He reports no bowel movement since 2/2  Plan: Observation for pneumoperitoneum: GI consult, NPO, IV fluids, serial abd exams, encourage ambulation       2/7:    Internal medicine consult: CT on admission showed dilation of colon with stool burden high risk for perforation  Consult GI for possible rectal tube given colonic distention  IV Pepcid  GI consult: No plan for colonoscopy decompression unless distention worsens to 10-12 cm  Rectal tube recommended  Frequent abdominal exams and KUB every 12 hours  Check stool studies once patient does have a bowel movement  NPO and IVF  2/8 Observation changed to inpatient  Surgery: colonic distention secondary to colon ileus  Per GI, scheduled for decompression today at noon  NPO  IV fluids  Serial abd exams  Date: 2/9 Day 2:    GI: Patient reports right sided groin pain  Otherwise, he reports his generalized abdominal pain is much improved today  He is passing gas  No bowel movements overnight  Continue frequent abd exams and KUB q 12 hrs  C diff PCR pending, however no sign of colitis during colonoscopy  Surgery: Continue with clear liquid diet as tolerated  Patient encouraged to go slow with diet  Likely advance tomorrow  Continue to monitor abdominal exams and bowel function  Decrease IV fluids   Continue with Colonic decompression per GI     ED Triage Vitals   Temperature Pulse Respirations Blood Pressure SpO2   02/06/23 2021 02/06/23 2021 02/06/23 2021 02/06/23 2021 02/06/23 2021   97 7 °F (36 5 °C) 94 20 95/67 97 %      Temp Source Heart Rate Source Patient Position - Orthostatic VS BP Location FiO2 (%)   02/06/23 2021 02/06/23 2021 02/06/23 2021 02/06/23 2021 --   Temporal Monitor Sitting Left arm       Pain Score       02/07/23 0422       8          Wt Readings from Last 1 Encounters:   02/02/23 86 6 kg (191 lb)     Additional Vital Signs:   Date/Time Temp Pulse Resp BP MAP (mmHg) SpO2 O2 Device   02/09/23 06:55:43 98 2 °F (36 8 °C) 75 16 141/79 100 97 % --   02/08/23 2326 -- -- -- -- -- -- None (Room air)   02/08/23 2255 98 6 °F (37 °C) 82 17 124/72 89 96 % None (Room air)   02/08/23 2052 -- -- -- -- -- 96 % None (Room air)   02/08/23 1944 -- -- -- 118/75 89 94 % None (Room air) 02/08/23 19:43:56 98 8 °F (37 1 °C) 83 20 118/75 89 94 % --   02/08/23 14:57:02 97 5 °F (36 4 °C) 81 16 -- -- 95 % --   02/08/23 14:57:01 -- -- -- 117/75 89 -- --     02/08/23 14:19:01 97 5 °F (36 4 °C) -- 16 131/86 101 -- --   02/08/23 1345 97 8 °F (36 6 °C) 83 16 132/74 97 94 % None (Room air)   02/08/23 1330 -- 85 17 130/79 100 93 % None (Room air)   02/08/23 1323 97 8 °F (36 6 °C) 83 20 151/94 113 95 % None (Room air)   02/08/23 1146 97 6 °F (36 4 °C) 87 22 143/83 -- 95 % --   02/08/23 0700 -- -- -- -- -- 95 % None (Room air)   02/08/23 06:45:39 97 6 °F (36 4 °C) 114 Abnormal  -- 153/98 116 94 % --   02/07/23 23:04:58 98 6 °F (37 °C) 86 -- 143/82 102 94 % --   02/07/23 19:05:29 98 8 °F (37 1 °C) 78 15 122/86 98 96 % --   02/07/23 1800 -- 74 14 138/76 -- 95 % None (Room air)   02/07/23 1615 -- 72 19 146/74 105 96 % None (Room air)   02/07/23 1200 -- 75 16 134/79 -- 94 % --   02/07/23 1000 -- 80 16 138/76 -- 94 % None (Room air)   02/07/23 0600 -- 75 16 143/69 95 94 % --   02/07/23 0200 -- 78 16 123/67 90 93 % None (Room air)   02/07/23 0015 -- 76 15 121/72 92 94 % None (Room air)   02/06/23 2245 -- 79 -- 126/76 93 94 % None (Room air)   02/06/23 2234 -- 77 18 126/76 -- 98 % None (Room air)     Pertinent Labs/Diagnostic Test Results:   XR abdomen 1 view kub   Final Result by Arias Ramirez MD (02/09 1364)      Status post insertion of the colonic decompression tube, with tip in the transverse colon  Diffuse large bowel distention again seen consistent with ileus, improved from priors  Workstation performed: [id number]         FL < 1 hour   Final Result by Arias Ramirez MD (02/09 0915)      Fluoroscopic guidance provided for procedure guidance  Please refer to the separate procedure notes for additional details           Workstation performed: [id number]         XR abdomen 1 view kub   Final Result by Loyda Lange MD (02/08 1989)      Nonobstructive bowel gas pattern with gas-filled the small bowel loops and colonic loops compatible with ileus       Gas-filled cecum noted with the diameter measuring 9 5 cm         Workstation performed: [id number]         XR abdomen 1 view kub   Final Result by Sravani Corado MD (02/08 9016)      Dilatation of the ascending and transverse colon with a maximum diameter of the ascending colon of up to 8 cm  Close follow-up recommended  The study was marked in EPIC for significant notification  Workstation performed: [id number]         CT abdomen pelvis with contrast   Final Result by Анна Valencia DO (02/06 2250)      Liquid stool within the colon with marked distention of the colon  Ascending colon measuring up to 7 cm  Findings may represent colitis  Given the size of the colon patient is at a high risk for perforation  Pneumoperitoneum which may be due to prior surgery however cannot rule out postsurgical complication or other etiologies  Cirrhotic liver with signs of portal hypertension  Thickening of the urinary bladder wall with surrounding inflammatory changes likely representing cystitis  Follow-up with urology is recommended         I personally discussed this study with Ching Dover on 2/6/2023 at 10:46 PM                      Workstation performed: [id number]           2/8 Colonoscopy:  FINDINGS:  • The cecum, splenic flexure, descending colon, sigmoid colon, rectosigmoid and rectum appeared normal   • Dilation in the ascending colon, hepatic flexure and transverse colon; completely decompressed by aspiration and tube placement, tube placed with guidewire under fluoroscopic guidance      Results from last 7 days   Lab Units 02/09/23  0538 02/08/23  0508 02/07/23  0420 02/07/23  0016 02/06/23  2113   WBC Thousand/uL 4 65 5 35 6 73  --  10 37*   HEMOGLOBIN g/dL 12 5 12 6 13 1  --  15 2   HEMATOCRIT % 37 7 38 1 38 8  --  44 9   PLATELETS Thousands/uL 97* 112* 102*   < > 143*   NEUTROS ABS Thousands/µL 2 20 2 77 3 61  --  7 10    < > = values in this interval not displayed           Results from last 7 days   Lab Units 02/09/23  0538 02/08/23  0508 02/07/23  0420 02/06/23  2113   SODIUM mmol/L 133* 133* 134* 132*   POTASSIUM mmol/L 4 4 4 0 3 7 3 6   CHLORIDE mmol/L 101 99 99 96   CO2 mmol/L 24 26 28 28   ANION GAP mmol/L 8 8 7 8   BUN mg/dL 5 7 13 16   CREATININE mg/dL 0 45* 0 52* 0 50* 0 58*   EGFR ml/min/1 73sq m 132 125 127 119   CALCIUM mg/dL 8 7 8 6 8 1* 8 9   MAGNESIUM mg/dL 2 1 2 0 2 4  --    PHOSPHORUS mg/dL 4 0 3 0 2 9  --      Results from last 7 days   Lab Units 02/07/23  0420 02/06/23  2113   AST U/L 66* 104*   ALT U/L 73 93*   ALK PHOS U/L 148* 188*   TOTAL PROTEIN g/dL 6 3* 7 4   ALBUMIN g/dL 3 2* 3 7   TOTAL BILIRUBIN mg/dL 1 72* 2 18*     Results from last 7 days   Lab Units 02/03/23  1051 02/03/23  0814   POC GLUCOSE mg/dl 128 103     Results from last 7 days   Lab Units 02/09/23  0538 02/08/23  0508 02/07/23  0420 02/06/23  2113   GLUCOSE RANDOM mg/dL 129 122 95 97         Results from last 7 days   Lab Units 02/06/23  2301   LACTIC ACID mmol/L 0 8           Results from last 7 days   Lab Units 02/06/23  2113   LIPASE u/L 68*                 Results from last 7 days   Lab Units 02/06/23  2317   CLARITY UA  Clear   COLOR UA  Yellow   SPEC GRAV UA  >=1 050*   PH UA  7 0   GLUCOSE UA mg/dl 1000 (1%)*   KETONES UA mg/dl Trace*   BLOOD UA  Negative   PROTEIN UA mg/dl Trace*   NITRITE UA  Negative   BILIRUBIN UA  Negative   UROBILINOGEN UA (BE) mg/dl <2 0   LEUKOCYTES UA  Negative   WBC UA /hpf 1-2   RBC UA /hpf 1-2   BACTERIA UA /hpf None Seen   EPITHELIAL CELLS WET PREP /hpf None Seen         ED Treatment:   Medication Administration from 02/06/2023 1919 to 02/07/2023 1827       Date/Time Order Dose Route Action     02/06/2023 2113 EST morphine injection 4 mg 4 mg Intravenous Given     02/06/2023 2113 EST sodium chloride 0 9 % bolus 1,000 mL 1,000 mL Intravenous New Bag     02/06/2023 2158 EST iohexol (OMNIPAQUE) 350 MG/ML injection (SINGLE-DOSE) 100 mL 100 mL Intravenous Given     02/07/2023 0742 EST dextrose 5 % and sodium chloride 0 45 % with KCl 20 mEq/L infusion 125 mL/hr Intravenous New Bag     02/07/2023 0013 EST dextrose 5 % and sodium chloride 0 45 % with KCl 20 mEq/L infusion 125 mL/hr Intravenous New Bag     02/07/2023 0658 EST heparin (porcine) subcutaneous injection 5,000 Units 5,000 Units Subcutaneous Given     02/07/2023 0011 EST heparin (porcine) subcutaneous injection 5,000 Units 5,000 Units Subcutaneous Given     02/07/2023 0724 EST morphine injection 2 mg 2 mg Intravenous Given     02/07/2023 0422 EST morphine injection 2 mg 2 mg Intravenous Given     02/07/2023 0009 EST morphine injection 2 mg 2 mg Intravenous Given     02/07/2023 0007 EST Famotidine (PF) (PEPCID) injection 20 mg 20 mg Intravenous Given        Past Medical History:   Diagnosis Date   • Asthma    • Chronic pain disorder    • GERD (gastroesophageal reflux disease)    • Hyperlipidemia    • Hypertension    • Liver abscess    • Meniscus tear     left knee work injury last assessed 08/24/2016   • Pancreatitis    • Pneumonia      Present on Admission:  • GERD (gastroesophageal reflux disease)  • Hypertension      Admitting Diagnosis: Dilatation of colon [K59 39]  Primary hypertension [I10]  Constipation [K59 00]  Post-operative complication [E78  9XXA]  Age/Sex: 50 y o  male  Admission Orders:  Scheduled Medications:  famotidine, 20 mg, Intravenous, Q12H [address]  heparin (porcine), 5,000 Units, Subcutaneous, Q8H [address]      Continuous IV Infusions:  dextrose 5 % and sodium chloride 0 45 % with KCl 20 mEq/L, 125 mL/hr, Intravenous, Continuous      PRN Meds:  acetaminophen, 650 mg, Oral, Q6H PRN  HYDROmorphone, 0 5 mg, Intravenous, Q4H PRN  ondansetron, 4 mg, Intravenous, Q6H PRN  oxyCODONE, 10 mg, Oral, Q4H PRN  oxyCODONE, 5 mg, Oral, Q4H PRN        IP CONSULT TO INTERNAL MEDICINE  IP CONSULT TO GASTROENTEROLOGY    Network Utilization Review Department  ATTENTION: Please call with any questions or concerns to [phone] and carefully listen to the prompts so that you are directed to the right person  All voicemails are confidential   Augusto Oar all requests for admission clinical reviews, approved or denied determinations and any other requests to dedicated fax number below belonging to the campus where the patient is receiving treatment   List of dedicated fax numbers for the Facilities:  [address] DENIALS (Administrative/Medical Necessity) [phone]   [address] (Maternity/NICU/Pediatrics) [phone]   [facility] Rosa [place]ene [phone]   [facility] [phone]   [address] [place] [address] [facility] [phone]   [facility]9 [facility] 134 815 [facility] [phone]

## 2023-02-07 NOTE — ASSESSMENT & PLAN NOTE
Presented with significant constipation  Reports no bowel movements for 5 days  CT on admission showed dilation of colon with stool burden high risk for perforation  Surgery primary team  Planning to consult GI for possible rectal tube given colonic distention  Currently n p o    Defer treatment plan to surgery team

## 2023-02-07 NOTE — H&P
H&P Exam - General Surgery   Gerg Pulliam 50 y o  male MRN: [id number]  Unit/Bed#: ED 09 Encounter: [id number]    Assessment/Plan     Greg Pulliam is a 50 y o  male with colonic distention secondary to colitis versus ileus  AVSS, WBC 6 7 from 10 3  T  bili 1 7 from 2 1  Remainder of labs within normal limits  Notified by nurse that patient had a large liquid bowel movement after my evaluation  CTAP: Liquid stool within the colon with marked distention of the colon  Ascending colon measuring up to 7 cm  Findings may represent colitis  Given the size of the colon patient is at a high risk for perforation      Pneumoperitoneum which may be due to prior surgery however cannot rule out postsurgical complication or other etiologies    Plan:  • No acute surgical intervention indicated at this time as this is likely related to postoperative ileus and narcotic use postop  • GI consult placed for decompression vs rectal tube placement for colonic distension however since patient had a bowel movement this is no longer indicated  • No small bowel dilation or N/V so no indication for NGT at this time  • NPO  • IVF  • Monitor bowel function  • Serial abdominal exams  • Monitor labs and vitals  • PRN pain medication and anti-emetics  • Encourage ambulation TID  • DVT ppx: heparin  • Continue home medications as prescribed   • General surgery primary  TT with any questions or concerns  History of Present Illness     HPI:  Greg Pulliam is a 50 y o  male with a past medical history of asthma, GERD, hyperlipidemia, hypertension who presents with increasing abdominal pain and distention over the last 4 days  The patient had an elective laparoscopic bilateral inguinal hernia repair on 2/3  He reports no bowel movement since 2/2  Denies passing gas since Thursday as well    He reports being able to eat normally Friday night Saturday and Sunday however yesterday he was unable to eat a normal diet because of the abdominal distention and discomfort  He states it is difficult to get in and out of bed due to the abdominal distention and lower abdominal pain  He denies any intestinal issues in the past however he has seen GI for other issues and currently follows with them  No history of ileus  The patient denies CP, SOB, palpitations, headache, fever, chills, unintentional weight loss, night sweats, nausea, vomiting, constipation, diarrhea  Review of Systems   Constitutional: Negative for chills and fever  HENT: Negative for ear pain and sore throat  Eyes: Negative for pain and visual disturbance  Respiratory: Negative for cough and shortness of breath  Cardiovascular: Negative for chest pain and palpitations  Gastrointestinal: Positive for abdominal distention, abdominal pain and constipation  Negative for diarrhea, nausea and vomiting  Genitourinary: Negative for dysuria and hematuria  Musculoskeletal: Negative for arthralgias and back pain  Skin: Negative for color change and rash  Neurological: Negative for seizures and syncope  All other systems reviewed and are negative  Historical Information   Past Medical History:   Diagnosis Date   • Asthma    • Chronic pain disorder    • GERD (gastroesophageal reflux disease)    • Hyperlipidemia    • Hypertension    • Liver abscess    • Meniscus tear     left knee work injury last assessed 08/24/2016   • Pancreatitis    • Pneumonia      Past Surgical History:   Procedure Laterality Date   • CELIAC PLEXUS BLOCK Bilateral 10/29/2018    Procedure: SPLANCHNIC NERVE BLOCK;  Surgeon: Thom Santoyo MD;  [facility];  Service: Pain Management    • CELIAC PLEXUS BLOCK Bilateral 01/10/2019    Procedure: SPLANCHNIC NERVE BLOCK;  Surgeon: Thom Santoyo MD;  [facility];  Service: Pain Management    • CHOLECYSTECTOMY     • COLONOSCOPY     • ESOPHAGOGASTRODUODENOSCOPY N/A 11/16/2017    Procedure: ESOPHAGOGASTRODUODENOSCOPY (EGD);   Surgeon: Javier Bright MD;  Location: [facility]; Service: Gastroenterology   • ESOPHAGOGASTRODUODENOSCOPY N/A 04/19/2018    Procedure: ESOPHAGOGASTRODUODENOSCOPY (EGD); Surgeon: Marsha Sanchez MD;  [facility]; Service: Gastroenterology   • ESOPHAGOGASTRODUODENOSCOPY N/A 05/10/2018    Procedure: ESOPHAGOGASTRODUODENOSCOPY (EGD); Surgeon: Sonya Jay MD;  [facility];   Service: Gastroenterology   • HERNIA REPAIR Bilateral 2/3/2023    Procedure: REPAIR HERNIA INGUINAL, LAPAROSCOPIC,;  Surgeon: Huy Dyer DO;  [facility];  Service: General   • IR TEMPORARY DIALYSIS CATHETER PLACEMENT  02/28/2019   • KNEE ARTHROSCOPY Bilateral    • KNEE ARTHROSCOPY Right 2009    cibishino last assessed 08/24/2016   • KNEE ARTHROSCOPY Right 07/01/2019   • LIVER SURGERY     • NERVE BLOCK Bilateral 05/29/2018    Procedure: SPLANCHNIC NERVE BLOCK;  Surgeon: Austin Wilder MD;  [facility];  Service: Pain Management    • PANCREAS SURGERY      stents   • PANCREATIC CYST EXCISION     • AZ INJECTION ANES LMBR/THRC PARAVERTBRL SYMPATHETIC Bilateral 12/28/2017    Procedure: SPLANCHNIC NERVE BLOCK;  Surgeon: Austin Wilder MD;  [facility];  Service: Pain Management    • AZ INJX ANES CELIAC PLEXUS W/WO RADIOLOGIC MONITRNG Bilateral 05/09/2017    Procedure: CELIAC PLEXUS BLOCK ;  Surgeon: Austin Wilder MD;  [facility];  Service: Pain Management    • AZ INJX ANES CELIAC PLEXUS W/WO RADIOLOGIC MONITRNG Bilateral 06/01/2017    Procedure: SPLANCHNIC NERVE BLOCK at T12;  Surgeon: Austin Wilder MD;  [facility];  Service: Pain Management    • AZ INJX ANES CELIAC PLEXUS W/WO RADIOLOGIC MONITRNG Bilateral 08/08/2017    Procedure: BILATERAL SPLANCHNIC NERVE BLOCK T12;  Surgeon: Austin Wilder MD;  [facility];  Service: Pain Management    • AZ INJX ANES CELIAC PLEXUS W/WO RADIOLOGIC MONITRNG Bilateral 04/18/2019    Procedure: BLOCK / INJECTION CELIAC PLEXUS;  Surgeon: Josh Vance MD;  [facility];  Service: Pain Management    • WV INJX ANES CELIAC PLEXUS W/WO RADIOLOGIC MONITRNG Bilateral 2019    Procedure: SPLANCHNIC NERVE BLOCK;  Surgeon: Josh Vance MD;  [facility];  Service: Pain Management    • WV INJX ANES CELIAC PLEXUS W/WO RADIOLOGIC MONITRNG Bilateral 10/17/2019    Procedure: SPLANCHNIC NERVE BLOCK;  Surgeon: Josh Vance MD;  [facility];  Service: Pain Management    • WV LAPAROSCOPY SURG CHOLECYSTECTOMY N/A 2017    Procedure: LAPAROSCOPIC CHOLECYSTECTOMY, IOC, POSSIBLE OPEN ;  Surgeon: Diony Holm MD;  [facility];  Service: General   • ROTATOR CUFF REPAIR Right    • SHOULDER ARTHROSCOPY     • SHOULDER ARTHROSCOPY Right      Social History   Social History     Substance and Sexual Activity   Alcohol Use Yes   • Alcohol/week: 2 0 standard drinks   • Types: 2 Cans of beer per week    Comment: on weekends     Social History     Substance and Sexual Activity   Drug Use Never     Social History     Tobacco Use   Smoking Status Former   • Packs/day: 1 00   • Years: 20 00   • Pack years: 20 00   • Types: Cigarettes   • Quit date: 3/15/2021   • Years since quittin 9   Smokeless Tobacco Never     Family History: non-contributory    Meds/Allergies   all medications and allergies reviewed  Allergies   Allergen Reactions   • Bee Venom Swelling       Objective   First Vitals:   Blood Pressure: 95/67 (23)  Pulse: 94 (23)  Temperature: 97 7 °F (36 5 °C) (23)  Temp Source: Temporal (23)  Respirations: 20 (23)  SpO2: 97 % (23)    Current Vitals:   Blood Pressure: 143/69 (23 0600)  Pulse: 75 (23 06)  Temperature: 97 7 °F (36 5 °C) (23)  Temp Source: Temporal (23)  Respirations: 16 (23 0600)  SpO2: 94 % (23)      Intake/Output Summary (Last 24 hours) at 2023 0901  Last data filed at 2023 2612  Gross per 24 hour   Intake 935 42 ml   Output --   Net 935 42 ml       Invasive Devices     Peripheral Intravenous Line  Duration           Peripheral IV 02/06/23 Left Forearm <1 day    Peripheral IV 02/07/23 Right;Ventral (anterior) Forearm <1 day                Physical Exam  Vitals reviewed  Constitutional:       General: He is not in acute distress  Appearance: Normal appearance  He is obese  He is not ill-appearing or toxic-appearing  HENT:      Head: Normocephalic and atraumatic  Right Ear: External ear normal       Left Ear: External ear normal       Nose: Nose normal       Mouth/Throat:      Mouth: Mucous membranes are moist    Eyes:      General: No scleral icterus  Right eye: No discharge  Left eye: No discharge  Conjunctiva/sclera: Conjunctivae normal    Cardiovascular:      Rate and Rhythm: Normal rate and regular rhythm  Pulmonary:      Effort: Pulmonary effort is normal  No respiratory distress  Abdominal:      General: There is distension  Palpations: Abdomen is soft  Tenderness: There is abdominal tenderness  There is no guarding  Musculoskeletal:         General: Normal range of motion  Cervical back: Normal range of motion  Skin:     General: Skin is warm  Coloration: Skin is not jaundiced  Neurological:      General: No focal deficit present  Mental Status: He is alert and oriented to person, place, and time  Psychiatric:         Mood and Affect: Mood normal          Behavior: Behavior normal          Thought Content: Thought content normal          Judgment: Judgment normal          Lab Results: I have personally reviewed pertinent lab results      Recent Results (from the past 36 hour(s))   CBC and differential    Collection Time: 02/06/23  9:13 PM   Result Value Ref Range    WBC 10 37 (H) 4 31 - 10 16 Thousand/uL    RBC 4 50 3 88 - 5 62 Million/uL    Hemoglobin 15 2 12 0 - 17 0 g/dL    Hematocrit 44 9 36 5 - 49 3 %     (H) 82 - 98 fL    MCH 33 8 26 8 - 34 3 pg    MCHC 33 9 31 4 - 37 4 g/dL    RDW 12 6 11 6 - 15 1 %    MPV 9 5 8 9 - 12 7 fL    Platelets 666 (L) 230 - 390 Thousands/uL    nRBC 0 /100 WBCs    Neutrophils Relative 69 43 - 75 %    Immat GRANS % 0 0 - 2 %    Lymphocytes Relative 20 14 - 44 %    Monocytes Relative 10 4 - 12 %    Eosinophils Relative 1 0 - 6 %    Basophils Relative 0 0 - 1 %    Neutrophils Absolute 7 10 1 85 - 7 62 Thousands/µL    Immature Grans Absolute 0 04 0 00 - 0 20 Thousand/uL    Lymphocytes Absolute 2 10 0 60 - 4 47 Thousands/µL    Monocytes Absolute 0 98 0 17 - 1 22 Thousand/µL    Eosinophils Absolute 0 11 0 00 - 0 61 Thousand/µL    Basophils Absolute 0 04 0 00 - 0 10 Thousands/µL   Comprehensive metabolic panel    Collection Time: 02/06/23  9:13 PM   Result Value Ref Range    Sodium 132 (L) 135 - 147 mmol/L    Potassium 3 6 3 5 - 5 3 mmol/L    Chloride 96 96 - 108 mmol/L    CO2 28 21 - 32 mmol/L    ANION GAP 8 4 - 13 mmol/L    BUN 16 5 - 25 mg/dL    Creatinine 0 58 (L) 0 60 - 1 30 mg/dL    Glucose 97 65 - 140 mg/dL    Calcium 8 9 8 3 - 10 1 mg/dL     (H) 5 - 45 U/L    ALT 93 (H) 12 - 78 U/L    Alkaline Phosphatase 188 (H) 46 - 116 U/L    Total Protein 7 4 6 4 - 8 4 g/dL    Albumin 3 7 3 5 - 5 0 g/dL    Total Bilirubin 2 18 (H) 0 20 - 1 00 mg/dL    eGFR 119 ml/min/1 73sq m   Lipase    Collection Time: 02/06/23  9:13 PM   Result Value Ref Range    Lipase 68 (L) 73 - 393 u/L   Lactic acid, plasma    Collection Time: 02/06/23 11:01 PM   Result Value Ref Range    LACTIC ACID 0 8 0 5 - 2 0 mmol/L   UA w Reflex to Microscopic w Reflex to Culture    Collection Time: 02/06/23 11:17 PM    Specimen: Urine, Clean Catch   Result Value Ref Range    Color, UA Yellow     Clarity, UA Clear     Specific Gravity, UA >=1 050 (H) 1 003 - 1 030    pH, UA 7 0 4 5, 5 0, 5 5, 6 0, 6 5, 7 0, 7 5, 8 0    Leukocytes, UA Negative Negative    Nitrite, UA Negative Negative    Protein, UA Trace (A) Negative mg/dl    Glucose, UA 1000 (1%) (A) Negative mg/dl    Ketones, UA Trace (A) Negative mg/dl    Urobilinogen, UA <2 0 <2 0 mg/dl mg/dl    Bilirubin, UA Negative Negative    Occult Blood, UA Negative Negative   Urine Microscopic    Collection Time: 02/06/23 11:17 PM   Result Value Ref Range    RBC, UA 1-2 None Seen, 1-2 /hpf    WBC, UA 1-2 None Seen, 1-2 /hpf    Epithelial Cells None Seen None Seen, Occasional /hpf    Bacteria, UA None Seen None Seen, Occasional /hpf   Platelet count    Collection Time: 02/07/23 12:16 AM   Result Value Ref Range    Platelets 962 (L) 691 - 390 Thousands/uL    MPV 10 0 8 9 - 12 7 fL   Comprehensive metabolic panel    Collection Time: 02/07/23  4:20 AM   Result Value Ref Range    Sodium 134 (L) 135 - 147 mmol/L    Potassium 3 7 3 5 - 5 3 mmol/L    Chloride 99 96 - 108 mmol/L    CO2 28 21 - 32 mmol/L    ANION GAP 7 4 - 13 mmol/L    BUN 13 5 - 25 mg/dL    Creatinine 0 50 (L) 0 60 - 1 30 mg/dL    Glucose 95 65 - 140 mg/dL    Calcium 8 1 (L) 8 3 - 10 1 mg/dL    Corrected Calcium 8 7 8 3 - 10 1 mg/dL    AST 66 (H) 5 - 45 U/L    ALT 73 12 - 78 U/L    Alkaline Phosphatase 148 (H) 46 - 116 U/L    Total Protein 6 3 (L) 6 4 - 8 4 g/dL    Albumin 3 2 (L) 3 5 - 5 0 g/dL    Total Bilirubin 1 72 (H) 0 20 - 1 00 mg/dL    eGFR 127 ml/min/1 73sq m   Magnesium    Collection Time: 02/07/23  4:20 AM   Result Value Ref Range    Magnesium 2 4 1 6 - 2 6 mg/dL   Phosphorus    Collection Time: 02/07/23  4:20 AM   Result Value Ref Range    Phosphorus 2 9 2 7 - 4 5 mg/dL   CBC and differential    Collection Time: 02/07/23  4:20 AM   Result Value Ref Range    WBC 6 73 4 31 - 10 16 Thousand/uL    RBC 3 85 (L) 3 88 - 5 62 Million/uL    Hemoglobin 13 1 12 0 - 17 0 g/dL    Hematocrit 38 8 36 5 - 49 3 %     (H) 82 - 98 fL    MCH 34 0 26 8 - 34 3 pg    MCHC 33 8 31 4 - 37 4 g/dL    RDW 12 8 11 6 - 15 1 %    MPV 9 8 8 9 - 12 7 fL    Platelets 259 (L) 820 - 390 Thousands/uL    nRBC 0 /100 WBCs    Neutrophils Relative 54 43 - 75 %    Immat GRANS % 0 0 - 2 %    Lymphocytes Relative 34 14 - 44 %    Monocytes Relative 10 4 - 12 %    Eosinophils Relative 1 0 - 6 %    Basophils Relative 1 0 - 1 %    Neutrophils Absolute 3 61 1 85 - 7 62 Thousands/µL    Immature Grans Absolute 0 02 0 00 - 0 20 Thousand/uL    Lymphocytes Absolute 2 31 0 60 - 4 47 Thousands/µL    Monocytes Absolute 0 67 0 17 - 1 22 Thousand/µL    Eosinophils Absolute 0 08 0 00 - 0 61 Thousand/µL    Basophils Absolute 0 04 0 00 - 0 10 Thousands/µL     Imaging: I have personally reviewed pertinent reports  CT abdomen pelvis with contrast    Result Date: 2/6/2023  Impression: Liquid stool within the colon with marked distention of the colon  Ascending colon measuring up to 7 cm  Findings may represent colitis  Given the size of the colon patient is at a high risk for perforation  Pneumoperitoneum which may be due to prior surgery however cannot rule out postsurgical complication or other etiologies  Cirrhotic liver with signs of portal hypertension  Thickening of the urinary bladder wall with surrounding inflammatory changes likely representing cystitis  Follow-up with urology is recommended  I personally discussed this study with Ernst Boston on 2/6/2023 at 10:46 PM  Workstation performed: [id number]        EKG, Pathology, and Other Studies: I have personally reviewed pertinent reports

## 2023-02-07 NOTE — TELEPHONE ENCOUNTER
DONE    Regarding: post -op issues/ constipation  ----- Message from Spencer Ng sent at 2/6/2023  7:03 PM EST -----  Pt's wife called, " my  recently had surgery and he still having some complications   He is is constipated from the pains, but he was was instructed to take miralax to have bowel movement, but it's been 2 hours and he still haven't  had a bowel movement

## 2023-02-07 NOTE — ED PROVIDER NOTES
History  Chief Complaint   Patient presents with   • Constipation     Hernia sx on 2/3, no BM since  Has trialed otc stool softeners and laxatives w/o success  Increased pain, abdominal distention  Patient is a 25-year-old male with a past medical history significant for hypertension, hyperlipidemia, chronic pancreatitis, postop day 3 from laparoscopic bilateral inguinal hernia presenting to the emergency department for evaluation of worsening lower abdominal pain, abdominal distention, constipation  He has not had a bowel movement since February 2  Does not remember the last time that he passed gas  He states that he is having 7 out of 10 pain across the entirety of his lower abdomen  Pain does not radiate he also states that his abdomen has been getting progressively more distended and swollen  Denying any fevers, chills, chest pain, difficulty breathing, nausea, vomiting, diarrhea  No redness or drainage from his surgical site wounds  No other complaints at this time  Prior to Admission Medications   Prescriptions Last Dose Informant Patient Reported? Taking?    Choline Fenofibrate (Fenofibric Acid) 135 MG CPDR   No No   Sig: Take 1 capsule (135 mg total) by mouth daily   Creon 37345-32554 units capsule   No No   Sig: take 1 capsule by mouth up to 3 times per day as needed for snacks   Empagliflozin 10 MG TABS   Yes No   Sig: Take 10 mg by mouth every morning   Insulin Pen Needle (Pen Needles) 32G X 5 MM MISC   Yes No   Sig: use once daily as directed   Multiple Vitamins-Minerals (MULTIVITAMIN ADULTS PO)   Yes No   Sig: Take by mouth daily after breakfast   Pancrelipase, Lip-Prot-Amyl, (Creon) 08831-689957 units CPEP   No No   Sig: Take 1 capsule (36,000 Units total) by mouth 3 (three) times a day before meals   albuterol (PROVENTIL HFA,VENTOLIN HFA) 90 mcg/act inhaler   No No   Sig: INHALE TWO PUFFS BY MOUTH EVERY 6 HOURS AS NEEDED FOR WHEEZING   amLODIPine (NORVASC) 5 mg tablet   No No Sig: TAKE ONE TABLET BY MOUTH EVERY DAY   docusate sodium (COLACE) 100 mg capsule   No No   Sig: Take 1 capsule (100 mg total) by mouth 3 (three) times a day as needed for constipation (while taking narcotic pain medication)   esomeprazole (NexIUM) 40 MG capsule   No No   Sig: Take 1 capsule (40 mg total) by mouth 2 (two) times a day before meals   Patient taking differently: Take 40 mg by mouth daily after breakfast   famotidine (PEPCID) 40 MG tablet   Yes No   Sig: Take 40 mg by mouth daily at bedtime as needed    insulin degludec Celestino Distance FlexTouch) 100 units/mL injection pen   Yes No   Si units subcut in hs   lisinopril (ZESTRIL) 20 mg tablet   No No   Sig: Take 1 tablet (20 mg total) by mouth daily   omega-3-acid ethyl esters (LOVAZA) 1 g capsule   No No   Sig: Take 2 capsules (2 g total) by mouth 2 (two) times a day   ondansetron (ZOFRAN-ODT) 4 mg disintegrating tablet   No No   Sig: Take 1 tablet (4 mg total) by mouth every 8 (eight) hours   propranolol (INDERAL) 20 mg tablet   No No   Sig: TAKE 1 TABLET BY MOUTH TWICE DAILY   rosuvastatin (CRESTOR) 40 MG tablet   No No   Sig: TAKE ONE TABLET BY MOUTH EVERY DAY   traMADol (ULTRAM) 50 mg tablet   No No   Sig: Take 1 tablet (50 mg total) by mouth every 6 (six) hours as needed for moderate pain for up to 10 days   traZODone (DESYREL) 50 mg tablet   No No   Sig: Take 1 tablet (50 mg total) by mouth daily at bedtime      Facility-Administered Medications: None       Past Medical History:   Diagnosis Date   • Asthma    • Chronic pain disorder    • GERD (gastroesophageal reflux disease)    • Hyperlipidemia    • Hypertension    • Liver abscess    • Meniscus tear     left knee work injury last assessed 2016   • Pancreatitis    • Pneumonia        Past Surgical History:   Procedure Laterality Date   • CELIAC PLEXUS BLOCK Bilateral 10/29/2018    Procedure: SPLANCHNIC NERVE BLOCK;  Surgeon: Mellisa Baldwin MD;  [facility];  Service: Pain Management • CELIAC PLEXUS BLOCK Bilateral 01/10/2019    Procedure: SPLANCHNIC NERVE BLOCK;  Surgeon: Nat Jon MD;  [facility];  Service: Pain Management    • CHOLECYSTECTOMY     • COLONOSCOPY     • ESOPHAGOGASTRODUODENOSCOPY N/A 11/16/2017    Procedure: ESOPHAGOGASTRODUODENOSCOPY (EGD); Surgeon: Tonya Ramirez MD;  [facility]; Service: Gastroenterology   • ESOPHAGOGASTRODUODENOSCOPY N/A 04/19/2018    Procedure: ESOPHAGOGASTRODUODENOSCOPY (EGD); Surgeon: Isidro Dotson MD;  [facility]; Service: Gastroenterology   • ESOPHAGOGASTRODUODENOSCOPY N/A 05/10/2018    Procedure: ESOPHAGOGASTRODUODENOSCOPY (EGD); Surgeon: Genet Wolf MD;  [facility];   Service: Gastroenterology   • HERNIA REPAIR Bilateral 2/3/2023    Procedure: REPAIR HERNIA INGUINAL, LAPAROSCOPIC,;  Surgeon: Abel Joseph DO;  [facility];  Service: General   • IR TEMPORARY DIALYSIS CATHETER PLACEMENT  02/28/2019   • KNEE ARTHROSCOPY Bilateral    • KNEE ARTHROSCOPY Right 2009    cibishino last assessed 08/24/2016   • KNEE ARTHROSCOPY Right 07/01/2019   • LIVER SURGERY     • NERVE BLOCK Bilateral 05/29/2018    Procedure: SPLANCHNIC NERVE BLOCK;  Surgeon: Nat Jon MD;  [facility];  Service: Pain Management    • PANCREAS SURGERY      stents   • PANCREATIC CYST EXCISION     • ME INJECTION ANES LMBR/THRC PARAVERTBRL SYMPATHETIC Bilateral 12/28/2017    Procedure: SPLANCHNIC NERVE BLOCK;  Surgeon: Nat Jon MD;  [facility];  Service: Pain Management    • ME INJX ANES CELIAC PLEXUS W/WO RADIOLOGIC MONITRNG Bilateral 05/09/2017    Procedure: CELIAC PLEXUS BLOCK ;  Surgeon: Nat Jon MD;  [facility];  Service: Pain Management    • ME INJX ANES CELIAC PLEXUS W/WO RADIOLOGIC MONITRNG Bilateral 06/01/2017    Procedure: SPLANCHNIC NERVE BLOCK at T12;  Surgeon: Nat Jon MD;  [facility];  Service: Pain Management    • ME INJX ANES CELIAC PLEXUS W/WO RADIOLOGIC MONITRNG Bilateral 2017    Procedure: BILATERAL SPLANCHNIC NERVE BLOCK T12;  Surgeon: Sami Francois MD;  [facility];  Service: Pain Management    • PA INJX ANES CELIAC PLEXUS W/WO RADIOLOGIC MONITRNG Bilateral 2019    Procedure: BLOCK / INJECTION CELIAC PLEXUS;  Surgeon: Sami Francois MD;  [facility];  Service: Pain Management    • PA INJX ANES CELIAC PLEXUS W/WO RADIOLOGIC MONITRNG Bilateral 2019    Procedure: SPLANCHNIC NERVE BLOCK;  Surgeon: Sami Francois MD;  [facility];  Service: Pain Management    • PA INJX ANES CELIAC PLEXUS W/WO RADIOLOGIC MONITRNG Bilateral 10/17/2019    Procedure: SPLANCHNIC NERVE BLOCK;  Surgeon: Sami Francois MD;  [facility];  Service: Pain Management    • PA LAPAROSCOPY SURG CHOLECYSTECTOMY N/A 2017    Procedure: LAPAROSCOPIC CHOLECYSTECTOMY, IOC, POSSIBLE OPEN ;  Surgeon: Jeancarlos Pendleton MD;  [facility];  Service: General   • ROTATOR CUFF REPAIR Right    • SHOULDER ARTHROSCOPY     • SHOULDER ARTHROSCOPY Right        Family History   Problem Relation Age of Onset   • Cirrhosis Mother    • Heart disease Other         cardiac disorder   • Cancer Other      I have reviewed and agree with the history as documented  E-Cigarette/Vaping   • E-Cigarette Use Former User      E-Cigarette/Vaping Substances   • Nicotine Yes    • THC No    • CBD No    • Flavoring No    • Other No    • Unknown No      Social History     Tobacco Use   • Smoking status: Former     Packs/day: 1 00     Years: 20 00     Pack years: 20 00     Types: Cigarettes     Quit date: 3/15/2021     Years since quittin 9   • Smokeless tobacco: Never   Vaping Use   • Vaping Use: Former   • Substances: Nicotine   Substance Use Topics   • Alcohol use: Yes     Alcohol/week: 2 0 standard drinks     Types: 2 Cans of beer per week     Comment: on weekends   • Drug use: Never       Review of Systems   Constitutional: Negative for chills and fever  HENT: Negative for congestion, drooling, facial swelling, nosebleeds, sore throat and voice change  Eyes: Negative for discharge and redness  Respiratory: Negative for cough, choking, chest tightness, shortness of breath and stridor  Cardiovascular: Negative for chest pain and palpitations  Gastrointestinal: Positive for abdominal distention, abdominal pain and constipation  Negative for diarrhea, nausea and vomiting  Musculoskeletal: Negative for arthralgias, back pain, neck pain and neck stiffness  Skin: Negative for color change, rash and wound  Neurological: Negative for dizziness, syncope, facial asymmetry, weakness, light-headedness, numbness and headaches  Psychiatric/Behavioral: Negative for confusion and suicidal ideas  The patient is not nervous/anxious  All other systems reviewed and are negative  Physical Exam  Physical Exam  Vitals reviewed  Constitutional:       General: He is not in acute distress  Appearance: Normal appearance  He is normal weight  He is not ill-appearing, toxic-appearing or diaphoretic  HENT:      Head: Normocephalic and atraumatic  Right Ear: External ear normal       Left Ear: External ear normal       Mouth/Throat:      Mouth: Mucous membranes are moist       Pharynx: Oropharynx is clear  No oropharyngeal exudate or posterior oropharyngeal erythema  Eyes:      General: No scleral icterus  Right eye: No discharge  Left eye: No discharge  Extraocular Movements: Extraocular movements intact  Conjunctiva/sclera: Conjunctivae normal    Cardiovascular:      Rate and Rhythm: Normal rate and regular rhythm  Pulses: Normal pulses  Heart sounds: Normal heart sounds  No murmur heard  No friction rub  No gallop  Pulmonary:      Effort: Pulmonary effort is normal  No respiratory distress  Breath sounds: Normal breath sounds  No stridor  No wheezing, rhonchi or rales     Abdominal:      General: Abdomen is flat  There is distension  Palpations: Abdomen is soft  Tenderness: There is abdominal tenderness (generalized)  There is no guarding or rebound  Comments: Laparoscopic site incisions well healing, no surrounding erythema or drainage   Musculoskeletal:         General: Normal range of motion  Cervical back: Normal range of motion and neck supple  Right lower leg: No edema  Left lower leg: No edema  Skin:     General: Skin is warm and dry  Capillary Refill: Capillary refill takes less than 2 seconds  Neurological:      General: No focal deficit present  Mental Status: He is alert and oriented to person, place, and time     Psychiatric:         Mood and Affect: Mood normal          Behavior: Behavior normal          Vital Signs  ED Triage Vitals   Temperature Pulse Respirations Blood Pressure SpO2   02/06/23 2021 02/06/23 2021 02/06/23 2021 02/06/23 2021 02/06/23 2021   97 7 °F (36 5 °C) 94 20 95/67 97 %      Temp Source Heart Rate Source Patient Position - Orthostatic VS BP Location FiO2 (%)   02/06/23 2021 02/06/23 2021 02/06/23 2021 02/06/23 2021 --   Temporal Monitor Sitting Left arm       Pain Score       02/07/23 0422       8           Vitals:    02/06/23 2234 02/06/23 2245 02/07/23 0015 02/07/23 0200   BP: 126/76 126/76 121/72 123/67   Pulse: 77 79 76 78   Patient Position - Orthostatic VS:  Sitting Sitting Sitting         Visual Acuity      ED Medications  Medications   ondansetron (ZOFRAN) injection 4 mg (has no administration in time range)   dextrose 5 % and sodium chloride 0 45 % with KCl 20 mEq/L infusion (125 mL/hr Intravenous New Bag 2/7/23 0013)   heparin (porcine) subcutaneous injection 5,000 Units (5,000 Units Subcutaneous Given 2/7/23 0011)   morphine injection 2 mg (2 mg Intravenous Given 2/7/23 0422)   Famotidine (PF) (PEPCID) injection 20 mg (20 mg Intravenous Given 2/7/23 0007)   morphine injection 4 mg (4 mg Intravenous Given 2/6/23 2113)   sodium chloride 0 9 % bolus 1,000 mL (0 mL Intravenous Stopped 2/6/23 2234)   iohexol (OMNIPAQUE) 350 MG/ML injection (SINGLE-DOSE) 100 mL (100 mL Intravenous Given 2/6/23 2158)       Diagnostic Studies  Results Reviewed     Procedure Component Value Units Date/Time    Comprehensive metabolic panel [[id number]]  (Abnormal) Collected: 02/07/23 0420    Lab Status: Final result Specimen: Blood from Arm, Left Updated: 02/07/23 0454     Sodium 134 mmol/L      Potassium 3 7 mmol/L      Chloride 99 mmol/L      CO2 28 mmol/L      ANION GAP 7 mmol/L      BUN 13 mg/dL      Creatinine 0 50 mg/dL      Glucose 95 mg/dL      Calcium 8 1 mg/dL      Corrected Calcium 8 7 mg/dL      AST 66 U/L      ALT 73 U/L      Alkaline Phosphatase 148 U/L      Total Protein 6 3 g/dL      Albumin 3 2 g/dL      Total Bilirubin 1 72 mg/dL      eGFR 127 ml/min/1 73sq m     Narrative:      [facility] guidelines for Chronic Kidney Disease (CKD):   •  Stage 1 with normal or high GFR (GFR > 90 mL/min/1 73 square meters)  •  Stage 2 Mild CKD (GFR = 60-89 mL/min/1 73 square meters)  •  Stage 3A Moderate CKD (GFR = 45-59 mL/min/1 73 square meters)  •  Stage 3B Moderate CKD (GFR = 30-44 mL/min/1 73 square meters)  •  Stage 4 Severe CKD (GFR = 15-29 mL/min/1 73 square meters)  •  Stage 5 End Stage CKD (GFR <15 mL/min/1 73 square meters)  Note: GFR calculation is accurate only with a steady state creatinine    Magnesium [[id number]]  (Normal) Collected: 02/07/23 0420    Lab Status: Final result Specimen: Blood from Arm, Left Updated: 02/07/23 0454     Magnesium 2 4 mg/dL     Phosphorus [[id number]]  (Normal) Collected: 02/07/23 0420    Lab Status: Final result Specimen: Blood from Arm, Left Updated: 02/07/23 0454     Phosphorus 2 9 mg/dL     CBC and differential [[id number]]  (Abnormal) Collected: 02/07/23 0420    Lab Status: Final result Specimen: Blood from Arm, Left Updated: 02/07/23 0430     WBC 6 73 Thousand/uL      RBC 3 85 Million/uL Hemoglobin 13 1 g/dL      Hematocrit 38 8 %       fL      MCH 34 0 pg      MCHC 33 8 g/dL      RDW 12 8 %      MPV 9 8 fL      Platelets 799 Thousands/uL      nRBC 0 /100 WBCs      Neutrophils Relative 54 %      Immat GRANS % 0 %      Lymphocytes Relative 34 %      Monocytes Relative 10 %      Eosinophils Relative 1 %      Basophils Relative 1 %      Neutrophils Absolute 3 61 Thousands/µL      Immature Grans Absolute 0 02 Thousand/uL      Lymphocytes Absolute 2 31 Thousands/µL      Monocytes Absolute 0 67 Thousand/µL      Eosinophils Absolute 0 08 Thousand/µL      Basophils Absolute 0 04 Thousands/µL     Urine Microscopic [[id number]]  (Normal) Collected: 02/06/23 2317    Lab Status: Final result Specimen: Urine, Clean Catch Updated: 02/07/23 0033     RBC, UA 1-2 /hpf      WBC, UA 1-2 /hpf      Epithelial Cells None Seen /hpf      Bacteria, UA None Seen /hpf     UA w Reflex to Microscopic w Reflex to Culture [[id number]]  (Abnormal) Collected: 02/06/23 2317    Lab Status: Final result Specimen: Urine, Clean Catch Updated: 02/07/23 0032     Color, UA Yellow     Clarity, UA Clear     Specific Gravity, UA >=1 050     pH, UA 7 0     Leukocytes, UA Negative     Nitrite, UA Negative     Protein, UA Trace mg/dl      Glucose, UA 1000 (1%) mg/dl      Ketones, UA Trace mg/dl      Urobilinogen, UA <2 0 mg/dl      Bilirubin, UA Negative     Occult Blood, UA Negative    Platelet count [[id number]]  (Abnormal) Collected: 02/07/23 0016    Lab Status: Final result Specimen: Blood from Arm, Right Updated: 02/07/23 0028     Platelets 180 Thousands/uL      MPV 10 0 fL     Lactic acid, plasma [[id number]]  (Normal) Collected: 02/06/23 2301    Lab Status: Final result Specimen: Blood from Arm, Left Updated: 02/06/23 2336     LACTIC ACID 0 8 mmol/L     Narrative:      Result may be elevated if tourniquet was used during collection      Comprehensive metabolic panel [[id number]]  (Abnormal) Collected: 02/06/23 2113    Lab Status: Final result Specimen: Blood from Arm, Left Updated: 02/06/23 2137     Sodium 132 mmol/L      Potassium 3 6 mmol/L      Chloride 96 mmol/L      CO2 28 mmol/L      ANION GAP 8 mmol/L      BUN 16 mg/dL      Creatinine 0 58 mg/dL      Glucose 97 mg/dL      Calcium 8 9 mg/dL       U/L      ALT 93 U/L      Alkaline Phosphatase 188 U/L      Total Protein 7 4 g/dL      Albumin 3 7 g/dL      Total Bilirubin 2 18 mg/dL      eGFR 119 ml/min/1 73sq m     Narrative:      [facility] guidelines for Chronic Kidney Disease (CKD):   •  Stage 1 with normal or high GFR (GFR > 90 mL/min/1 73 square meters)  •  Stage 2 Mild CKD (GFR = 60-89 mL/min/1 73 square meters)  •  Stage 3A Moderate CKD (GFR = 45-59 mL/min/1 73 square meters)  •  Stage 3B Moderate CKD (GFR = 30-44 mL/min/1 73 square meters)  •  Stage 4 Severe CKD (GFR = 15-29 mL/min/1 73 square meters)  •  Stage 5 End Stage CKD (GFR <15 mL/min/1 73 square meters)  Note: GFR calculation is accurate only with a steady state creatinine    Lipase [[id number]]  (Abnormal) Collected: 02/06/23 2113    Lab Status: Final result Specimen: Blood from Arm, Left Updated: 02/06/23 2137     Lipase 68 u/L     CBC and differential [[id number]]  (Abnormal) Collected: 02/06/23 2113    Lab Status: Final result Specimen: Blood from Arm, Left Updated: 02/06/23 2119     WBC 10 37 Thousand/uL      RBC 4 50 Million/uL      Hemoglobin 15 2 g/dL      Hematocrit 44 9 %       fL      MCH 33 8 pg      MCHC 33 9 g/dL      RDW 12 6 %      MPV 9 5 fL      Platelets 184 Thousands/uL      nRBC 0 /100 WBCs      Neutrophils Relative 69 %      Immat GRANS % 0 %      Lymphocytes Relative 20 %      Monocytes Relative 10 %      Eosinophils Relative 1 %      Basophils Relative 0 %      Neutrophils Absolute 7 10 Thousands/µL      Immature Grans Absolute 0 04 Thousand/uL      Lymphocytes Absolute 2 10 Thousands/µL      Monocytes Absolute 0 98 Thousand/µL      Eosinophils Absolute 0 11 Thousand/µL      Basophils Absolute 0 04 Thousands/µL                  CT abdomen pelvis with contrast   Final Result by Pete Pleitez DO (02/06 2250)      Liquid stool within the colon with marked distention of the colon  Ascending colon measuring up to 7 cm  Findings may represent colitis  Given the size of the colon patient is at a high risk for perforation  Pneumoperitoneum which may be due to prior surgery however cannot rule out postsurgical complication or other etiologies  Cirrhotic liver with signs of portal hypertension  Thickening of the urinary bladder wall with surrounding inflammatory changes likely representing cystitis  Follow-up with urology is recommended  I personally discussed this study with Lian Donnelly on 2/6/2023 at 10:46 PM                      Workstation performed: [id number]                    Procedures  Procedures         ED Course                               SBIRT 22yo+    Flowsheet Row Most Recent Value   SBIRT (25 yo +)    In order to provide better care to our patients, we are screening all of our patients for alcohol and drug use  Would it be okay to ask you these screening questions? Yes Filed at: 02/06/2023 2032   Initial Alcohol Screen: US AUDIT-C     1  How often do you have a drink containing alcohol? 0 Filed at: 02/06/2023 2032   2  How many drinks containing alcohol do you have on a typical day you are drinking? 0 Filed at: 02/06/2023 2032   3a  Male UNDER 65: How often do you have five or more drinks on one occasion? 0 Filed at: 02/06/2023 2032   3b  FEMALE Any Age, or MALE 65+: How often do you have 4 or more drinks on one occassion? 0 Filed at: 02/06/2023 2032   Audit-C Score 0 Filed at: 02/06/2023 2032   EUSEBIO: How many times in the past year have you    Used an illegal drug or used a prescription medication for non-medical reasons?  Never Filed at: 02/06/2023 2032                    Medical Decision Making  Patient presented for evaluation of constipation, abdominal pain, abdominal distention in the setting of a recent bilateral inguinal hernia repair  Upon arrival patient appears comfortable  He is not in any acute distress  Vital signs unremarkable  Patient does have abdominal distention, generalized abdominal tenderness without rebound or guarding on examination  Laparoscopic site incisions are well-healing, no surrounding erythema or drainage  Labs with mild hyponatremia, repleted with normal saline  CT of the abdomen pelvis is consistent with potential colitis, however given the degree of colonic dilation patient is high risk for perforation  He also has pneumoperitoneum which is likely from recent laparoscopic surgery  I believe patient's symptoms likely secondary to postoperative ileus  General surgery was consulted and will admit the patient to their service  He is currently in stable condition  Dilatation of colon: acute illness or injury  Post-operative complication: acute illness or injury  Amount and/or Complexity of Data Reviewed  Labs: ordered  Radiology: ordered  Risk  Prescription drug management  Decision regarding hospitalization  Disposition  Final diagnoses:   Dilatation of colon   Post-operative complication     Time reflects when diagnosis was documented in both MDM as applicable and the Disposition within this note     Time User Action Codes Description Comment    2/6/2023 11:27 PM Rickey, [facility] Primary hypertension     2/6/2023 11:30 PM Kodak Ulrich Add [K59 39] Dilatation of colon     2/6/2023 11:38 PM Kodak Ulrich Modify [I10] Primary hypertension     2/6/2023 11:38 PM Kodak Ulrich Modify [K59 39] Dilatation of colon     2/6/2023 11:38 PM Oli Dykes [address]  [id number]] Post-operative complication       ED Disposition     ED Disposition   Admit    Condition   Stable    Date/Time   Mon Feb 6, 2023 11:29 PM    Comment   Case was discussed with General Surgery and the patient's admission status was agreed to be Admission Status: observation status to the service of Dr Lorrie Walker   Follow-up Information    None         Patient's Medications   Discharge Prescriptions    No medications on file       No discharge procedures on file      PDMP Review       Value Time User    PDMP Reviewed  Yes 1/20/2022  9:27 AM LUANA Segundo          ED Provider  Electronically Signed by           Samanta Ribeiro PA-C  02/07/23 5573

## 2023-02-07 NOTE — CONSULTS
[facility] 1975, 50 [facility]  [facility] MRN: [id number]  Unit/Bed#: ED 09 Encounter: [id number]  Primary Care Provider: LUANA Queen   Date and time admitted to hospital: 2/6/2023  8:23 PM    Inpatient consult to Internal Medicine  Consult performed by: Lexie Agarwal MD  Consult ordered by: Chiara Ruiz MD          * Constipation  Assessment & Plan  Presented with significant constipation  Reports no bowel movements for 5 days  CT on admission showed dilation of colon with stool burden high risk for perforation  Surgery primary team  Planning to consult GI for possible rectal tube given colonic distention  Currently n p o  Defer treatment plan to surgery team    Hypertension  Assessment & Plan  Blood pressure controlled  Hold p o  agents for now    GERD (gastroesophageal reflux disease)  Assessment & Plan  Will place on IV Pepcid      VTE Prophylaxis: Heparin  / sequential compression device     Recommendations for Discharge:  · na    Counseling / Coordination of Care Time: 1 hour  Greater than 50% of total time spent on patient counseling and coordination of care  Collaboration of Care: Were Recommendations Directly Discussed with Primary Treatment Team? - Yes     History of Present Illness:    Fernanda Lesch is a 50 y o  male who is originally admitted to the general surgery service due to severe constipation  We are consulted for medical management of hypertension, hyperlipidemia  Initially presented with abdominal pain and distention  Denies chest pain, shortness of breath, cough, fevers, chills    Review of Systems:    Review of Systems   Constitutional: Negative for appetite change, chills, diaphoresis, fatigue, fever and unexpected weight change  HENT: Negative for congestion, rhinorrhea and sore throat  Eyes: Negative for photophobia and visual disturbance  Respiratory: Negative for cough, shortness of breath and wheezing  Cardiovascular: Negative for chest pain, palpitations and leg swelling  Gastrointestinal: Positive for abdominal pain and constipation  Negative for anal bleeding, blood in stool, diarrhea, nausea and vomiting  Genitourinary: Negative for decreased urine volume, difficulty urinating, dysuria, flank pain, frequency, hematuria and urgency  Musculoskeletal: Negative for arthralgias, back pain, joint swelling and myalgias  Skin: Negative for color change and rash  Neurological: Negative for dizziness, seizures, facial asymmetry, speech difficulty, numbness and headaches  Psychiatric/Behavioral: Negative for agitation, confusion and decreased concentration  The patient is not nervous/anxious  Past Medical and Surgical History:     Past Medical History:   Diagnosis Date   • Asthma    • Chronic pain disorder    • GERD (gastroesophageal reflux disease)    • Hyperlipidemia    • Hypertension    • Liver abscess    • Meniscus tear     left knee work injury last assessed 08/24/2016   • Pancreatitis    • Pneumonia        Past Surgical History:   Procedure Laterality Date   • CELIAC PLEXUS BLOCK Bilateral 10/29/2018    Procedure: SPLANCHNIC NERVE BLOCK;  Surgeon: Dominic De León MD;  [facility];  Service: Pain Management    • CELIAC PLEXUS BLOCK Bilateral 01/10/2019    Procedure: SPLANCHNIC NERVE BLOCK;  Surgeon: Dominic De León MD;  [facility];  Service: Pain Management    • CHOLECYSTECTOMY     • COLONOSCOPY     • ESOPHAGOGASTRODUODENOSCOPY N/A 11/16/2017    Procedure: ESOPHAGOGASTRODUODENOSCOPY (EGD); Surgeon: Yosef Lopez MD;  [facility]; Service: Gastroenterology   • ESOPHAGOGASTRODUODENOSCOPY N/A 04/19/2018    Procedure: ESOPHAGOGASTRODUODENOSCOPY (EGD); Surgeon: Shelly Eckert MD;  [facility]; Service: Gastroenterology   • ESOPHAGOGASTRODUODENOSCOPY N/A 05/10/2018    Procedure: ESOPHAGOGASTRODUODENOSCOPY (EGD);   Surgeon: Hector Sims MD;  [facility]; Service: Gastroenterology   • HERNIA REPAIR Bilateral 2/3/2023    Procedure: REPAIR HERNIA INGUINAL, LAPAROSCOPIC,;  Surgeon: Rubens Harmon DO;  [facility];  Service: General   • IR TEMPORARY DIALYSIS CATHETER PLACEMENT  02/28/2019   • KNEE ARTHROSCOPY Bilateral    • KNEE ARTHROSCOPY Right 2009    cibishino last assessed 08/24/2016   • KNEE ARTHROSCOPY Right 07/01/2019   • LIVER SURGERY     • NERVE BLOCK Bilateral 05/29/2018    Procedure: SPLANCHNIC NERVE BLOCK;  Surgeon: Blanche Landau, MD;  [facility];  Service: Pain Management    • PANCREAS SURGERY      stents   • PANCREATIC CYST EXCISION     • [place] INJECTION ANES LMBR/THRC PARAVERTBRL SYMPATHETIC Bilateral 12/28/2017    Procedure: SPLANCHNIC NERVE BLOCK;  Surgeon: Blanche Landau, MD;  [facility];  Service: Pain Management    •  [facility] W/WO RADIOLOGIC MONITRNG Bilateral 05/09/2017    Procedure: CELIAC PLEXUS BLOCK ;  Surgeon: Blanche Landau, MD;  [facility];  Service: Pain Management    • NY INJX ANES CELIAC PLEXUS W/WO RADIOLOGIC MONITRNG Bilateral 06/01/2017    Procedure: SPLANCHNIC NERVE BLOCK at T12;  Surgeon: Blanche Landau, MD;  [facility];  Service: Pain Management    • [place] INJX ANES CELIAC PLEXUS W/WO RADIOLOGIC MONITRNG Bilateral 08/08/2017    Procedure: BILATERAL SPLANCHNIC NERVE BLOCK T12;  Surgeon: Blanche Landau, MD;  [facility];  Service: Pain Management    • [place] INJX ANES CELIAC PLEXUS W/WO RADIOLOGIC MONITRNG Bilateral 04/18/2019    Procedure: BLOCK / INJECTION CELIAC PLEXUS;  Surgeon: Blanche Landau, MD;  [facility];  Service: Pain Management    • [place] INJX ANES CELIAC PLEXUS W/WO RADIOLOGIC MONITRNG Bilateral 08/20/2019    Procedure: SPLANCHNIC NERVE BLOCK;  Surgeon: Blanche Landau, MD;  [facility];  Service: Pain Management    • [place] INJX ANES CELIAC PLEXUS W/WO RADIOLOGIC MONITRNG Bilateral 10/17/2019    Procedure: SPLANCHNIC NERVE BLOCK;  Surgeon: Estefany Holcomb MD;  [facility];  Service: Pain Management    • ME LAPAROSCOPY SURG CHOLECYSTECTOMY N/A 2017    Procedure: LAPAROSCOPIC CHOLECYSTECTOMY, IOC, POSSIBLE OPEN ;  Surgeon: Pebbles Chaves MD;  [facility];  Service: General   • ROTATOR CUFF REPAIR Right    • SHOULDER ARTHROSCOPY     • SHOULDER ARTHROSCOPY Right        Meds/Allergies:    PTA meds:   Prior to Admission Medications   Prescriptions Last Dose Informant Patient Reported? Taking?    Choline Fenofibrate (Fenofibric Acid) 135 MG CPDR   No No   Sig: Take 1 capsule (135 mg total) by mouth daily   Creon 78208-84552 units capsule   No No   Sig: take 1 capsule by mouth up to 3 times per day as needed for snacks   Empagliflozin 10 MG TABS   Yes No   Sig: Take 10 mg by mouth every morning   Insulin Pen Needle (Pen Needles) 32G X 5 MM MISC   Yes No   Sig: use once daily as directed   Multiple Vitamins-Minerals (MULTIVITAMIN ADULTS PO)   Yes No   Sig: Take by mouth daily after breakfast   Pancrelipase, Lip-Prot-Amyl, (Creon) 32107-623678 units CPEP   No No   Sig: Take 1 capsule (36,000 Units total) by mouth 3 (three) times a day before meals   albuterol (PROVENTIL HFA,VENTOLIN HFA) 90 mcg/act inhaler   No No   Sig: INHALE TWO PUFFS BY MOUTH EVERY 6 HOURS AS NEEDED FOR WHEEZING   amLODIPine (NORVASC) 5 mg tablet   No No   Sig: TAKE ONE TABLET BY MOUTH EVERY DAY   docusate sodium (COLACE) 100 mg capsule   No No   Sig: Take 1 capsule (100 mg total) by mouth 3 (three) times a day as needed for constipation (while taking narcotic pain medication)   esomeprazole (NexIUM) 40 MG capsule   No No   Sig: Take 1 capsule (40 mg total) by mouth 2 (two) times a day before meals   Patient taking differently: Take 40 mg by mouth daily after breakfast   famotidine (PEPCID) 40 MG tablet   Yes No   Sig: Take 40 mg by mouth daily at bedtime as needed    insulin degludec Carlos Aant FlexTouch) 100 units/mL injection pen   Yes No   Si units subcut in hs lisinopril (ZESTRIL) 20 mg tablet   No No   Sig: Take 1 tablet (20 mg total) by mouth daily   omega-3-acid ethyl esters (LOVAZA) 1 g capsule   No No   Sig: Take 2 capsules (2 g total) by mouth 2 (two) times a day   ondansetron (ZOFRAN-ODT) 4 mg disintegrating tablet   No No   Sig: Take 1 tablet (4 mg total) by mouth every 8 (eight) hours   propranolol (INDERAL) 20 mg tablet   No No   Sig: TAKE 1 TABLET BY MOUTH TWICE DAILY   rosuvastatin (CRESTOR) 40 MG tablet   No No   Sig: TAKE ONE TABLET BY MOUTH EVERY DAY   traMADol (ULTRAM) 50 mg tablet   No No   Sig: Take 1 tablet (50 mg total) by mouth every 6 (six) hours as needed for moderate pain for up to 10 days   traZODone (DESYREL) 50 mg tablet   No No   Sig: Take 1 tablet (50 mg total) by mouth daily at bedtime      Facility-Administered Medications: None       Allergies: Allergies   Allergen Reactions   • Bee Venom Swelling       Social History:     Marital Status: /Civil Union    Substance Use History:   Social History     Substance and Sexual Activity   Alcohol Use Yes   • Alcohol/week: 2 0 standard drinks   • Types: 2 Cans of beer per week    Comment: on weekends     Social History     Tobacco Use   Smoking Status Former   • Packs/day: 1 00   • Years: 20 00   • Pack years: 20 00   • Types: Cigarettes   • Quit date: 3/15/2021   • Years since quittin 9   Smokeless Tobacco Never     Social History     Substance and Sexual Activity   Drug Use Never       Family History:    Family History   Problem Relation Age of Onset   • Cirrhosis Mother    • Heart disease Other         cardiac disorder   • Cancer Other        Physical Exam:     Vitals:   Blood Pressure: 138/76 (23 1000)  Pulse: 80 (23 1000)  Temperature: 97 7 °F (36 5 °C) (23)  Temp Source: Temporal (23)  Respirations: 16 (23 1000)  SpO2: 94 % (23 1000)    Physical Exam  Constitutional:       General: He is not in acute distress       Appearance: He is well-developed  He is not diaphoretic  HENT:      Head: Normocephalic and atraumatic  Nose: Nose normal       Mouth/Throat:      Pharynx: No oropharyngeal exudate  Eyes:      General: No scleral icterus  Conjunctiva/sclera: Conjunctivae normal    Cardiovascular:      Rate and Rhythm: Normal rate and regular rhythm  Heart sounds: Normal heart sounds  No murmur heard  No friction rub  No gallop  Pulmonary:      Effort: Pulmonary effort is normal  No respiratory distress  Breath sounds: Normal breath sounds  No wheezing or rales  Chest:      Chest wall: No tenderness  Abdominal:      General: Bowel sounds are normal  There is no distension  Palpations: Abdomen is soft  Tenderness: There is no abdominal tenderness  There is no guarding  Musculoskeletal:         General: No tenderness or deformity  Normal range of motion  Cervical back: Normal range of motion and neck supple  Skin:     General: Skin is warm and dry  Findings: No erythema  Neurological:      Mental Status: He is alert  Mental status is at baseline  Additional Data:     Lab Results: I have personally reviewed pertinent reports        Results from last 7 days   Lab Units 02/07/23  0420   WBC Thousand/uL 6 73   HEMOGLOBIN g/dL 13 1   HEMATOCRIT % 38 8   PLATELETS Thousands/uL 102*   NEUTROS PCT % 54   LYMPHS PCT % 34   MONOS PCT % 10   EOS PCT % 1     Results from last 7 days   Lab Units 02/07/23  0420   SODIUM mmol/L 134*   POTASSIUM mmol/L 3 7   CHLORIDE mmol/L 99   CO2 mmol/L 28   BUN mg/dL 13   CREATININE mg/dL 0 50*   ANION GAP mmol/L 7   CALCIUM mg/dL 8 1*   ALBUMIN g/dL 3 2*   TOTAL BILIRUBIN mg/dL 1 72*   ALK PHOS U/L 148*   ALT U/L 73   AST U/L 66*   GLUCOSE RANDOM mg/dL 95             Lab Results   Component Value Date/Time    HGBA1C 5 4 08/24/2021 11:14 AM    HGBA1C 5 3 08/24/2021 11:07 AM    HGBA1C 5 1 12/10/2020 10:25 AM    HGBA1C 5 1 12/10/2020 12:00 AM    HGBA1C 5 3 06/15/2020 10:04 AM     Results from last 7 days   Lab Units 02/03/23  1051 02/03/23  0814   POC GLUCOSE mg/dl 128 103     Results from last 7 days   Lab Units 02/06/23  2301   LACTIC ACID mmol/L 0 8       Imaging: I have personally reviewed pertinent reports  CT abdomen pelvis with contrast   Final Result by Fredo Lawson DO (02/06 2250)      Liquid stool within the colon with marked distention of the colon  Ascending colon measuring up to 7 cm  Findings may represent colitis  Given the size of the colon patient is at a high risk for perforation  Pneumoperitoneum which may be due to prior surgery however cannot rule out postsurgical complication or other etiologies  Cirrhotic liver with signs of portal hypertension  Thickening of the urinary bladder wall with surrounding inflammatory changes likely representing cystitis  Follow-up with urology is recommended  I personally discussed this study with Shasta Houston on 2/6/2023 at 10:46 PM                      Workstation performed: [id number]         XR abdomen 1 view kub    (Results Pending)       EKG, Pathology, and Other Studies Reviewed on Admission:   · EKG: reviewed    ** Please Note: This note has been constructed using a voice recognition system   **

## 2023-02-07 NOTE — CONSULTS
Consultation - [facility]  Cheo Estrada 50 y o  male MRN: [id number]  Unit/Bed#: ED 09 Encounter: [id number]         Reason for Consult / Principal Problem: Colonic distention on CT    HPI: Mr Nina Torres is a 50year old male with history of chronic pancreatitis secondary to hypertriglyceridemia, GERD, fatty liver disease, and HTN  Patient presented to the emergency room for abdominal distention, abdominal pain and lack of passage of gas/stool since prior to inguinal hernia repair on 2/3  Patient reports no signs or GI bleeding, diarrhea or constipation prior to surgery  Patient had repair of small umbilical hernia, right sided indirect inguinal hernia, left-sided indirect and left-sided indirect inguinal hernias  CT on admission revealed liquid stool within the colon with ascending colon measuring up to 7 cm  Pneumoperitoneum noted  According to the report, the liver did appear cirrhotic  He had similar findings on MRI abdomen in 2022  Mild leukocytosis slightly above 10,000 with no left shift  Patient was seen and examined in the emergency room this morning  Patient reports generalized abdominal pain worse when he tries to lay on his sides  Later in the morning, patient reportedly had a bowel movement in the emergency room  Patient's last endoscopic ultrasound was with Dr Mariza House in March 2022 for a celiac plexus block  Last colonoscopy was in 2020 with removal of 4 large polyps  Patient would be due for a recall colonoscopy this year in 2023  Review of Systems:    CONSTITUTIONAL: Denies any fever, chills, or rigors  Good appetite, and no recent weight loss  HEENT: No earache or tinnitus  Denies hearing loss or visual disturbances  CARDIOVASCULAR: No chest pain or palpitations  RESPIRATORY: Denies any cough, hemoptysis, shortness of breath or dyspnea on exertion  GASTROINTESTINAL: As noted in the History of Present Illness  GENITOURINARY: No problems with urination  Denies any hematuria or dysuria  NEUROLOGIC: No dizziness or vertigo, denies headaches  MUSCULOSKELETAL: Denies any muscle or joint pain  SKIN: Denies skin rashes or itching  ENDOCRINE: Denies excessive thirst  Denies intolerance to heat or cold  PSYCHOSOCIAL: Denies depression or anxiety  Denies any recent memory loss  Historical Information   Past Medical History:   Diagnosis Date   • Asthma    • Chronic pain disorder    • GERD (gastroesophageal reflux disease)    • Hyperlipidemia    • Hypertension    • Liver abscess    • Meniscus tear     left knee work injury last assessed 08/24/2016   • Pancreatitis    • Pneumonia      Past Surgical History:   Procedure Laterality Date   • CELIAC PLEXUS BLOCK Bilateral 10/29/2018    Procedure: SPLANCHNIC NERVE BLOCK;  Surgeon: Cyndi Huang MD;  [facility];  Service: Pain Management    • CELIAC PLEXUS BLOCK Bilateral 01/10/2019    Procedure: SPLANCHNIC NERVE BLOCK;  Surgeon: Cyndi Huang MD;  [facility];  Service: Pain Management    • CHOLECYSTECTOMY     • COLONOSCOPY     • ESOPHAGOGASTRODUODENOSCOPY N/A 11/16/2017    Procedure: ESOPHAGOGASTRODUODENOSCOPY (EGD); Surgeon: Gabriella Douglas MD;  [facility]; Service: Gastroenterology   • ESOPHAGOGASTRODUODENOSCOPY N/A 04/19/2018    Procedure: ESOPHAGOGASTRODUODENOSCOPY (EGD); Surgeon: Ganesh Pastor MD;  [facility]; Service: Gastroenterology   • ESOPHAGOGASTRODUODENOSCOPY N/A 05/10/2018    Procedure: ESOPHAGOGASTRODUODENOSCOPY (EGD); Surgeon: Tanner Grace MD;  [facility];   Service: Gastroenterology   • HERNIA REPAIR Bilateral 2/3/2023    Procedure: REPAIR HERNIA INGUINAL, LAPAROSCOPIC,;  Surgeon: Mona Carey DO;  [facility];  Service: General   • IR TEMPORARY DIALYSIS CATHETER PLACEMENT  02/28/2019   • KNEE ARTHROSCOPY Bilateral    • KNEE ARTHROSCOPY Right 2009    cibishino last assessed 08/24/2016   • KNEE ARTHROSCOPY Right 07/01/2019   • LIVER SURGERY     • NERVE BLOCK Bilateral 05/29/2018    Procedure: SPLANCHNIC NERVE BLOCK;  Surgeon: Kishan Mckinney MD;  [facility];  Service: Pain Management    • PANCREAS SURGERY      stents   • PANCREATIC CYST EXCISION     • [place] INJECTION ANES LMBR/THRC PARAVERTBRL SYMPATHETIC Bilateral 12/28/2017    Procedure: SPLANCHNIC NERVE BLOCK;  Surgeon: Kishan Mckinney MD;  [facility];  Service: Pain Management    •  [facility]/ RADIOLOGIC MONITRNG Bilateral 05/09/2017    Procedure: CELIAC PLEXUS BLOCK ;  Surgeon: Kishan Mckinney MD;  [facility];  Service: Pain Management    •  [facility]/ RADIOLOGIC MONITRNG Bilateral 06/01/2017    Procedure: SPLANCHNIC NERVE BLOCK at T12;  Surgeon: Kishan Mckinney MD;  [facility];  Service: Pain Management    •  [facility]/ RADIOLOGIC MONITRNG Bilateral 08/08/2017    Procedure: BILATERAL SPLANCHNIC NERVE BLOCK T12;  Surgeon: Kishan Mckinney MD;  [facility];  Service: Pain Management    •  [facility]/ RADIOLOGIC MONITRNG Bilateral 04/18/2019    Procedure: BLOCK / INJECTION CELIAC PLEXUS;  Surgeon: Kishan Mckinney MD;  [facility];  Service: Pain Management    •  [facility]/ RADIOLOGIC MONITRNG Bilateral 08/20/2019    Procedure: SPLANCHNIC NERVE BLOCK;  Surgeon: Kishan Mckinney MD;  [facility];  Service: Pain Management    •  [facility]/ RADIOLOGIC MONITRNG Bilateral 10/17/2019    Procedure: SPLANCHNIC NERVE BLOCK;  Surgeon: Kishan Mckinney MD;  [facility];  Service: Pain Management    • OR LAPAROSCOPY SURG CHOLECYSTECTOMY N/A 02/14/2017    Procedure: LAPAROSCOPIC CHOLECYSTECTOMY, IOC, POSSIBLE OPEN ;  Surgeon: Ugo Goode MD;  [facility];  Service: General   • ROTATOR CUFF REPAIR Right    • SHOULDER ARTHROSCOPY     • SHOULDER ARTHROSCOPY Right      Social History   Social History Substance and Sexual Activity   Alcohol Use Yes   • Alcohol/week: 2 0 standard drinks   • Types: 2 Cans of beer per week    Comment: on weekends     Social History     Substance and Sexual Activity   Drug Use Never     Social History     Tobacco Use   Smoking Status Former   • Packs/day: 1 00   • Years:    • Pack years:    • Types: Cigarettes   • Quit date: 3/15/2021   • Years since quittin 9   Smokeless Tobacco Never     Family History   Problem Relation Age of Onset   • Cirrhosis Mother    • Heart disease Other         cardiac disorder   • Cancer Other         Meds/Allergies     Current Facility-Administered Medications   Medication Dose Route Frequency   • dextrose 5 % and sodium chloride 0 45 % with KCl 20 mEq/L infusion  125 mL/hr Intravenous Continuous   • Famotidine (PF) (PEPCID) injection 20 mg  20 mg Intravenous Q12H [address]   • heparin (porcine) subcutaneous injection 5,000 Units  5,000 Units Subcutaneous Q8H [address]   • morphine injection 2 mg  2 mg Intravenous Q3H PRN   • ondansetron (ZOFRAN) injection 4 mg  4 mg Intravenous Q6H PRN       Allergies   Allergen Reactions   • Bee Venom Swelling         Objective     Blood pressure 134/79, pulse 75, temperature 97 7 °F (36 5 °C), temperature source Temporal, resp  rate 16, SpO2 94 %  Intake/Output Summary (Last 24 hours) at 2023 1215  Last data filed at 2023 7140  Gross per 24 hour   Intake 935 42 ml   Output --   Net 935 42 ml         PHYSICAL EXAM:      General Appearance:   Alert and oriented x 3  Cooperative, and in no respiratory distress   HEENT:   Normocephalic, atraumatic, anicteric      Neck:  Supple, symmetrical, trachea midline   Lungs:   Clear to auscultation bilaterally; no rales, rhonchi or wheezing; respirations unlabored    Heart[de-identified]   S1 and S2 normal; regular rate and rhythm; no murmur, rub, or gallop     Abdomen:   Soft, no guarding or rigidity, moderately distended; hypoactive bowel sounds; no masses, no organomegaly; positive tympany     Genitalia:   Deferred    Rectal:   Deferred    Extremities:  No cyanosis, clubbing or edema    Pulses:  2+ and symmetric all extremities    Skin:  Skin color, texture, turgor normal, no rashes or lesions    Lymph nodes:  No palpable cervical or supraclavicular lymphadenopathy        Lab Results:   Results from last 7 days   Lab Units 02/07/23  0420   WBC Thousand/uL 6 73   HEMOGLOBIN g/dL 13 1   HEMATOCRIT % 38 8   PLATELETS Thousands/uL 102*   NEUTROS PCT % 54   LYMPHS PCT % 34   MONOS PCT % 10   EOS PCT % 1     Results from last 7 days   Lab Units 02/07/23  0420   POTASSIUM mmol/L 3 7   CHLORIDE mmol/L 99   CO2 mmol/L 28   BUN mg/dL 13   CREATININE mg/dL 0 50*   CALCIUM mg/dL 8 1*   ALK PHOS U/L 148*   ALT U/L 73   AST U/L 66*         Results from last 7 days   Lab Units 02/06/23  2113   LIPASE u/L 68*       Imaging Studies: I have personally reviewed pertinent imaging studies  CT abdomen pelvis with contrast    Result Date: 2/6/2023  Impression: Liquid stool within the colon with marked distention of the colon  Ascending colon measuring up to 7 cm  Findings may represent colitis  Given the size of the colon patient is at a high risk for perforation  Pneumoperitoneum which may be due to prior surgery however cannot rule out postsurgical complication or other etiologies  Cirrhotic liver with signs of portal hypertension  Thickening of the urinary bladder wall with surrounding inflammatory changes likely representing cystitis  Follow-up with urology is recommended  I personally discussed this study with Gladys Khan on 2/6/2023 at 10:46 PM  Workstation performed: [id number]       ASSESSMENT and PLAN:      1) Colonic distention, colitis on imaging - Began post-op inguinal hernia repair  Spoke with Dr Donald Cruz from radiology  Cecum measures 8 cm  Remaining colon around 7 cm  Later this morning, patient reportedly had a bowel movement   Patient without profound leukocytosis, fever or diarrhea prior to surgery that would suggest C diff    - Discussed importance of moving or at least turning on patient's side while in bed to promote motility   - Discussed with Dr Lakia Craft  No plan for colonoscopy decompression unless distention worsens to 10-12 cm  Rectal tube recommended  - Frequent abdominal exams and KUB every 12 hours   - Continue to monitor and replete potassium, calcium, and magnesium as necessary   - Check stool studies once patient does have a bowel movement   - NGT if patient vomits  - NPO and IVF  - Discussed with Sean Jones PA-C from surgery     2) Cirrhosis on imaging - Noted on MRI last year and again on CT on admission  Patient had no signs of portal hypertension on his endoscopic ultrasound 1 year ago  He was referred to hepatology, does not appear that he had an appointment  He does have history of fatty liver disease from hypertriglyceridemia   - Once above improves, we can consider repeat MRI to investigate   - If consistent with cirrhosis, would need an eventual EGD screening for varices       The patient was seen and examined by Dr Lakia Craft, all rod medical decisions were made with Dr Lakia Craft     Thank you for allowing us to participate in the care of this pleasant patient  We will follow up with you closely

## 2023-02-08 ENCOUNTER — APPOINTMENT (OUTPATIENT)
Dept: RADIOLOGY | Facility: HOSPITAL | Age: 48
End: 2023-02-08

## 2023-02-08 ENCOUNTER — ANESTHESIA EVENT (OUTPATIENT)
Dept: GASTROENTEROLOGY | Facility: HOSPITAL | Age: 48
End: 2023-02-08

## 2023-02-08 ENCOUNTER — APPOINTMENT (OUTPATIENT)
Dept: GASTROENTEROLOGY | Facility: HOSPITAL | Age: 48
End: 2023-02-08
Attending: INTERNAL MEDICINE

## 2023-02-08 ENCOUNTER — ANESTHESIA (OUTPATIENT)
Dept: GASTROENTEROLOGY | Facility: HOSPITAL | Age: 48
End: 2023-02-08

## 2023-02-08 LAB
ANION GAP SERPL CALCULATED.3IONS-SCNC: 8 MMOL/L (ref 4–13)
BASOPHILS # BLD AUTO: 0.02 THOUSANDS/ÂΜL (ref 0–0.1)
BASOPHILS NFR BLD AUTO: 0 % (ref 0–1)
BUN SERPL-MCNC: 7 MG/DL (ref 5–25)
CALCIUM SERPL-MCNC: 8.6 MG/DL (ref 8.3–10.1)
CHLORIDE SERPL-SCNC: 99 MMOL/L (ref 96–108)
CO2 SERPL-SCNC: 26 MMOL/L (ref 21–32)
CREAT SERPL-MCNC: 0.52 MG/DL (ref 0.6–1.3)
EOSINOPHIL # BLD AUTO: 0.12 THOUSAND/ÂΜL (ref 0–0.61)
EOSINOPHIL NFR BLD AUTO: 2 % (ref 0–6)
ERYTHROCYTE [DISTWIDTH] IN BLOOD BY AUTOMATED COUNT: 12.3 % (ref 11.6–15.1)
GFR SERPL CREATININE-BSD FRML MDRD: 125 ML/MIN/1.73SQ M
GLUCOSE P FAST SERPL-MCNC: 122 MG/DL (ref 65–99)
GLUCOSE SERPL-MCNC: 122 MG/DL (ref 65–140)
HCT VFR BLD AUTO: 38.1 % (ref 36.5–49.3)
HGB BLD-MCNC: 12.6 G/DL (ref 12–17)
IMM GRANULOCYTES # BLD AUTO: 0.02 THOUSAND/UL (ref 0–0.2)
IMM GRANULOCYTES NFR BLD AUTO: 0 % (ref 0–2)
LYMPHOCYTES # BLD AUTO: 1.77 THOUSANDS/ÂΜL (ref 0.6–4.47)
LYMPHOCYTES NFR BLD AUTO: 33 % (ref 14–44)
MAGNESIUM SERPL-MCNC: 2 MG/DL (ref 1.6–2.6)
MCH RBC QN AUTO: 33.1 PG (ref 26.8–34.3)
MCHC RBC AUTO-ENTMCNC: 33.1 G/DL (ref 31.4–37.4)
MCV RBC AUTO: 100 FL (ref 82–98)
MONOCYTES # BLD AUTO: 0.65 THOUSAND/ÂΜL (ref 0.17–1.22)
MONOCYTES NFR BLD AUTO: 12 % (ref 4–12)
NEUTROPHILS # BLD AUTO: 2.77 THOUSANDS/ÂΜL (ref 1.85–7.62)
NEUTS SEG NFR BLD AUTO: 53 % (ref 43–75)
NRBC BLD AUTO-RTO: 0 /100 WBCS
PHOSPHATE SERPL-MCNC: 3 MG/DL (ref 2.7–4.5)
PLATELET # BLD AUTO: 112 THOUSANDS/UL (ref 149–390)
PMV BLD AUTO: 9.5 FL (ref 8.9–12.7)
POTASSIUM SERPL-SCNC: 4 MMOL/L (ref 3.5–5.3)
RBC # BLD AUTO: 3.81 MILLION/UL (ref 3.88–5.62)
SODIUM SERPL-SCNC: 133 MMOL/L (ref 135–147)
WBC # BLD AUTO: 5.35 THOUSAND/UL (ref 4.31–10.16)

## 2023-02-08 PROCEDURE — 0D9K8ZZ DRAINAGE OF ASCENDING COLON, VIA NATURAL OR ARTIFICIAL OPENING ENDOSCOPIC: ICD-10-PCS | Performed by: SURGERY

## 2023-02-08 RX ORDER — PROPOFOL 10 MG/ML
INJECTION, EMULSION INTRAVENOUS AS NEEDED
Status: DISCONTINUED | OUTPATIENT
Start: 2023-02-08 | End: 2023-02-08

## 2023-02-08 RX ORDER — ONDANSETRON 2 MG/ML
4 INJECTION INTRAMUSCULAR; INTRAVENOUS ONCE AS NEEDED
Status: DISCONTINUED | OUTPATIENT
Start: 2023-02-08 | End: 2023-02-08 | Stop reason: HOSPADM

## 2023-02-08 RX ORDER — ONDANSETRON 2 MG/ML
INJECTION INTRAMUSCULAR; INTRAVENOUS AS NEEDED
Status: DISCONTINUED | OUTPATIENT
Start: 2023-02-08 | End: 2023-02-08

## 2023-02-08 RX ORDER — LIDOCAINE HYDROCHLORIDE 10 MG/ML
INJECTION, SOLUTION EPIDURAL; INFILTRATION; INTRACAUDAL; PERINEURAL AS NEEDED
Status: DISCONTINUED | OUTPATIENT
Start: 2023-02-08 | End: 2023-02-08

## 2023-02-08 RX ORDER — SODIUM CHLORIDE, SODIUM LACTATE, POTASSIUM CHLORIDE, CALCIUM CHLORIDE 600; 310; 30; 20 MG/100ML; MG/100ML; MG/100ML; MG/100ML
INJECTION, SOLUTION INTRAVENOUS CONTINUOUS PRN
Status: DISCONTINUED | OUTPATIENT
Start: 2023-02-08 | End: 2023-02-08

## 2023-02-08 RX ORDER — SUCCINYLCHOLINE/SOD CL,ISO/PF 100 MG/5ML
SYRINGE (ML) INTRAVENOUS AS NEEDED
Status: DISCONTINUED | OUTPATIENT
Start: 2023-02-08 | End: 2023-02-08

## 2023-02-08 RX ADMIN — SODIUM CHLORIDE, SODIUM LACTATE, POTASSIUM CHLORIDE, AND CALCIUM CHLORIDE: .6; .31; .03; .02 INJECTION, SOLUTION INTRAVENOUS at 12:13

## 2023-02-08 RX ADMIN — HEPARIN SODIUM 5000 UNITS: 5000 INJECTION INTRAVENOUS; SUBCUTANEOUS at 14:38

## 2023-02-08 RX ADMIN — DEXTROSE, SODIUM CHLORIDE, AND POTASSIUM CHLORIDE 125 ML/HR: 5; .45; .15 INJECTION INTRAVENOUS at 20:41

## 2023-02-08 RX ADMIN — HEPARIN SODIUM 5000 UNITS: 5000 INJECTION INTRAVENOUS; SUBCUTANEOUS at 23:17

## 2023-02-08 RX ADMIN — OXYCODONE HYDROCHLORIDE 10 MG: 10 TABLET ORAL at 04:59

## 2023-02-08 RX ADMIN — HEPARIN SODIUM 5000 UNITS: 5000 INJECTION INTRAVENOUS; SUBCUTANEOUS at 05:00

## 2023-02-08 RX ADMIN — HYDROMORPHONE HYDROCHLORIDE 0.5 MG: 1 INJECTION, SOLUTION INTRAMUSCULAR; INTRAVENOUS; SUBCUTANEOUS at 09:18

## 2023-02-08 RX ADMIN — PROPOFOL 30 MG: 10 INJECTION, EMULSION INTRAVENOUS at 12:30

## 2023-02-08 RX ADMIN — DEXTROSE, SODIUM CHLORIDE, AND POTASSIUM CHLORIDE 125 ML/HR: 5; .45; .15 INJECTION INTRAVENOUS at 09:27

## 2023-02-08 RX ADMIN — PROPOFOL 170 MG: 10 INJECTION, EMULSION INTRAVENOUS at 12:26

## 2023-02-08 RX ADMIN — Medication 100 MG: at 12:27

## 2023-02-08 RX ADMIN — HYDROMORPHONE HYDROCHLORIDE 0.5 MG: 1 INJECTION, SOLUTION INTRAMUSCULAR; INTRAVENOUS; SUBCUTANEOUS at 23:26

## 2023-02-08 RX ADMIN — ONDANSETRON 4 MG: 2 INJECTION INTRAMUSCULAR; INTRAVENOUS at 12:35

## 2023-02-08 RX ADMIN — HYDROMORPHONE HYDROCHLORIDE 0.5 MG: 1 INJECTION, SOLUTION INTRAMUSCULAR; INTRAVENOUS; SUBCUTANEOUS at 18:47

## 2023-02-08 RX ADMIN — FAMOTIDINE 20 MG: 10 INJECTION, SOLUTION INTRAVENOUS at 20:50

## 2023-02-08 RX ADMIN — LIDOCAINE HYDROCHLORIDE 50 MG: 10 INJECTION, SOLUTION EPIDURAL; INFILTRATION; INTRACAUDAL; PERINEURAL at 12:26

## 2023-02-08 RX ADMIN — FAMOTIDINE 20 MG: 10 INJECTION, SOLUTION INTRAVENOUS at 09:18

## 2023-02-08 RX ADMIN — HYDROMORPHONE HYDROCHLORIDE 0.5 MG: 1 INJECTION, SOLUTION INTRAMUSCULAR; INTRAVENOUS; SUBCUTANEOUS at 05:42

## 2023-02-08 RX ADMIN — OXYCODONE HYDROCHLORIDE 10 MG: 10 TABLET ORAL at 20:52

## 2023-02-08 RX ADMIN — HYDROMORPHONE HYDROCHLORIDE 0.5 MG: 1 INJECTION, SOLUTION INTRAMUSCULAR; INTRAVENOUS; SUBCUTANEOUS at 00:52

## 2023-02-08 RX ADMIN — DEXTROSE, SODIUM CHLORIDE, AND POTASSIUM CHLORIDE 125 ML/HR: 5; .45; .15 INJECTION INTRAVENOUS at 00:44

## 2023-02-08 NOTE — PROGRESS NOTES
Progress Note - General Surgery   Neville Boy 50 y o  male MRN: [id number]  Unit/Bed#: -01 Encounter: [id number]    Assessment:  Neville Moya is a 50 y o  male with colonic distention secondary to krystni ileus  AVSS, no leukocytosis  Labs within normal limits  Rectal tube with 0 output overnight  Repeat KUB: Nonobstructive bowel gas pattern with gas-filled the small bowel loops and colonic loops compatible with ileus   Gas-filled cecum noted with the diameter measuring 9 5 cm    Plan:  • Per GI, scheduled for decompression today at noon  • N p o   • IV fluid hydration  • Serial abdominal exams   • Monitor labs and vitals  • PRN pain medication and anti-emetics  • Encourage ambulation  • DVT ppx: heparin  • Incentive spirometry 10 times/hour while awake  • Continue home medications as prescribed   • General surgery primary  Tiger text with any questions or concerns  Subjective/Objective    Subjective: No acute events overnight  Objective:     Blood pressure 153/98, pulse (!) 114, temperature 97 6 °F (36 4 °C), resp  rate 15, SpO2 95 %  ,There is no height or weight on file to calculate BMI  Intake/Output Summary (Last 24 hours) at 2/8/2023 0836  Last data filed at 2/8/2023 0700  Gross per 24 hour   Intake 2129 17 ml   Output 800 ml   Net 1329 17 ml       Invasive Devices     Peripheral Intravenous Line  Duration           Peripheral IV 02/06/23 Left Forearm 1 day    Peripheral IV 02/07/23 Right;Ventral (anterior) Forearm 1 day          Drain  Duration           Rectal Tube With balloon <1 day                Physical Exam:   GEN: NAD  HEENT: NCAT, MMM  CV: RRR, no m/r/g  Lung: Normal effort, CTA B/L, no w/r/r  Ab: Soft, distended, diffusely tender to palpation throughout abdomen worse in lower abdomen  Extrem: No CCE   Neuro: A+Ox3     Lab, Imaging and other studies:I have personally reviewed pertinent lab results       VTE Pharmacologic Prophylaxis: Heparin  VTE Mechanical Prophylaxis: sequential compression device    Recent Results (from the past 36 hour(s))   CBC and differential    Collection Time: 02/06/23  9:13 PM   Result Value Ref Range    WBC 10 37 (H) 4 31 - 10 16 Thousand/uL    RBC 4 50 3 88 - 5 62 Million/uL    Hemoglobin 15 2 12 0 - 17 0 g/dL    Hematocrit 44 9 36 5 - 49 3 %     (H) 82 - 98 fL    MCH 33 8 26 8 - 34 3 pg    MCHC 33 9 31 4 - 37 4 g/dL    RDW 12 6 11 6 - 15 1 %    MPV 9 5 8 9 - 12 7 fL    Platelets 096 (L) 051 - 390 Thousands/uL    nRBC 0 /100 WBCs    Neutrophils Relative 69 43 - 75 %    Immat GRANS % 0 0 - 2 %    Lymphocytes Relative 20 14 - 44 %    Monocytes Relative 10 4 - 12 %    Eosinophils Relative 1 0 - 6 %    Basophils Relative 0 0 - 1 %    Neutrophils Absolute 7 10 1 85 - 7 62 Thousands/µL    Immature Grans Absolute 0 04 0 00 - 0 20 Thousand/uL    Lymphocytes Absolute 2 10 0 60 - 4 47 Thousands/µL    Monocytes Absolute 0 98 0 17 - 1 22 Thousand/µL    Eosinophils Absolute 0 11 0 00 - 0 61 Thousand/µL    Basophils Absolute 0 04 0 00 - 0 10 Thousands/µL   Comprehensive metabolic panel    Collection Time: 02/06/23  9:13 PM   Result Value Ref Range    Sodium 132 (L) 135 - 147 mmol/L    Potassium 3 6 3 5 - 5 3 mmol/L    Chloride 96 96 - 108 mmol/L    CO2 28 21 - 32 mmol/L    ANION GAP 8 4 - 13 mmol/L    BUN 16 5 - 25 mg/dL    Creatinine 0 58 (L) 0 60 - 1 30 mg/dL    Glucose 97 65 - 140 mg/dL    Calcium 8 9 8 3 - 10 1 mg/dL     (H) 5 - 45 U/L    ALT 93 (H) 12 - 78 U/L    Alkaline Phosphatase 188 (H) 46 - 116 U/L    Total Protein 7 4 6 4 - 8 4 g/dL    Albumin 3 7 3 5 - 5 0 g/dL    Total Bilirubin 2 18 (H) 0 20 - 1 00 mg/dL    eGFR 119 ml/min/1 73sq m   Lipase    Collection Time: 02/06/23  9:13 PM   Result Value Ref Range    Lipase 68 (L) 73 - 393 u/L   Lactic acid, plasma    Collection Time: 02/06/23 11:01 PM   Result Value Ref Range    LACTIC ACID 0 8 0 5 - 2 0 mmol/L   UA w Reflex to Microscopic w Reflex to Culture    Collection Time: 02/06/23 11:17 PM    Specimen: Urine, Clean Catch   Result Value Ref Range    Color, UA Yellow     Clarity, UA Clear     Specific Gravity, UA >=1 050 (H) 1 003 - 1 030    pH, UA 7 0 4 5, 5 0, 5 5, 6 0, 6 5, 7 0, 7 5, 8 0    Leukocytes, UA Negative Negative    Nitrite, UA Negative Negative    Protein, UA Trace (A) Negative mg/dl    Glucose, UA 1000 (1%) (A) Negative mg/dl    Ketones, UA Trace (A) Negative mg/dl    Urobilinogen, UA <2 0 <2 0 mg/dl mg/dl    Bilirubin, UA Negative Negative    Occult Blood, UA Negative Negative   Urine Microscopic    Collection Time: 02/06/23 11:17 PM   Result Value Ref Range    RBC, UA 1-2 None Seen, 1-2 /hpf    WBC, UA 1-2 None Seen, 1-2 /hpf    Epithelial Cells None Seen None Seen, Occasional /hpf    Bacteria, UA None Seen None Seen, Occasional /hpf   Platelet count    Collection Time: 02/07/23 12:16 AM   Result Value Ref Range    Platelets 578 (L) 984 - 390 Thousands/uL    MPV 10 0 8 9 - 12 7 fL   Comprehensive metabolic panel    Collection Time: 02/07/23  4:20 AM   Result Value Ref Range    Sodium 134 (L) 135 - 147 mmol/L    Potassium 3 7 3 5 - 5 3 mmol/L    Chloride 99 96 - 108 mmol/L    CO2 28 21 - 32 mmol/L    ANION GAP 7 4 - 13 mmol/L    BUN 13 5 - 25 mg/dL    Creatinine 0 50 (L) 0 60 - 1 30 mg/dL    Glucose 95 65 - 140 mg/dL    Calcium 8 1 (L) 8 3 - 10 1 mg/dL    Corrected Calcium 8 7 8 3 - 10 1 mg/dL    AST 66 (H) 5 - 45 U/L    ALT 73 12 - 78 U/L    Alkaline Phosphatase 148 (H) 46 - 116 U/L    Total Protein 6 3 (L) 6 4 - 8 4 g/dL    Albumin 3 2 (L) 3 5 - 5 0 g/dL    Total Bilirubin 1 72 (H) 0 20 - 1 00 mg/dL    eGFR 127 ml/min/1 73sq m   Magnesium    Collection Time: 02/07/23  4:20 AM   Result Value Ref Range    Magnesium 2 4 1 6 - 2 6 mg/dL   Phosphorus    Collection Time: 02/07/23  4:20 AM   Result Value Ref Range    Phosphorus 2 9 2 7 - 4 5 mg/dL   CBC and differential    Collection Time: 02/07/23  4:20 AM   Result Value Ref Range    WBC 6 73 4 31 - 10 16 Thousand/uL    RBC 3 85 (L) 3 88 - 5 62 Million/uL    Hemoglobin 13 1 12 0 - 17 0 g/dL    Hematocrit 38 8 36 5 - 49 3 %     (H) 82 - 98 fL    MCH 34 0 26 8 - 34 3 pg    MCHC 33 8 31 4 - 37 4 g/dL    RDW 12 8 11 6 - 15 1 %    MPV 9 8 8 9 - 12 7 fL    Platelets 763 (L) 912 - 390 Thousands/uL    nRBC 0 /100 WBCs    Neutrophils Relative 54 43 - 75 %    Immat GRANS % 0 0 - 2 %    Lymphocytes Relative 34 14 - 44 %    Monocytes Relative 10 4 - 12 %    Eosinophils Relative 1 0 - 6 %    Basophils Relative 1 0 - 1 %    Neutrophils Absolute 3 61 1 85 - 7 62 Thousands/µL    Immature Grans Absolute 0 02 0 00 - 0 20 Thousand/uL    Lymphocytes Absolute 2 31 0 60 - 4 47 Thousands/µL    Monocytes Absolute 0 67 0 17 - 1 22 Thousand/µL    Eosinophils Absolute 0 08 0 00 - 0 61 Thousand/µL    Basophils Absolute 0 04 0 00 - 0 10 Thousands/µL   CBC and differential    Collection Time: 02/08/23  5:08 AM   Result Value Ref Range    WBC 5 35 4 31 - 10 16 Thousand/uL    RBC 3 81 (L) 3 88 - 5 62 Million/uL    Hemoglobin 12 6 12 0 - 17 0 g/dL    Hematocrit 38 1 36 5 - 49 3 %     (H) 82 - 98 fL    MCH 33 1 26 8 - 34 3 pg    MCHC 33 1 31 4 - 37 4 g/dL    RDW 12 3 11 6 - 15 1 %    MPV 9 5 8 9 - 12 7 fL    Platelets 030 (L) 216 - 390 Thousands/uL    nRBC 0 /100 WBCs    Neutrophils Relative 53 43 - 75 %    Immat GRANS % 0 0 - 2 %    Lymphocytes Relative 33 14 - 44 %    Monocytes Relative 12 4 - 12 %    Eosinophils Relative 2 0 - 6 %    Basophils Relative 0 0 - 1 %    Neutrophils Absolute 2 77 1 85 - 7 62 Thousands/µL    Immature Grans Absolute 0 02 0 00 - 0 20 Thousand/uL    Lymphocytes Absolute 1 77 0 60 - 4 47 Thousands/µL    Monocytes Absolute 0 65 0 17 - 1 22 Thousand/µL    Eosinophils Absolute 0 12 0 00 - 0 61 Thousand/µL    Basophils Absolute 0 02 0 00 - 0 10 Thousands/µL   Basic metabolic panel    Collection Time: 02/08/23  5:08 AM   Result Value Ref Range    Sodium 133 (L) 135 - 147 mmol/L    Potassium 4 0 3 5 - 5 3 mmol/L    Chloride 99 96 - 108 mmol/L    CO2 26 21 - 32 mmol/L    ANION GAP 8 4 - 13 mmol/L    BUN 7 5 - 25 mg/dL    Creatinine 0 52 (L) 0 60 - 1 30 mg/dL    Glucose 122 65 - 140 mg/dL    Glucose, Fasting 122 (H) 65 - 99 mg/dL    Calcium 8 6 8 3 - 10 1 mg/dL    eGFR 125 ml/min/1 73sq m   Magnesium    Collection Time: 02/08/23  5:08 AM   Result Value Ref Range    Magnesium 2 0 1 6 - 2 6 mg/dL   Phosphorus    Collection Time: 02/08/23  5:08 AM   Result Value Ref Range    Phosphorus 3 0 2 7 - 4 5 mg/dL

## 2023-02-08 NOTE — PLAN OF CARE
Problem: PAIN - ADULT  Goal: Verbalizes/displays adequate comfort level or baseline comfort level  Description: Interventions:  - Encourage patient to monitor pain and request assistance  - Assess pain using appropriate pain scale  - Administer analgesics based on type and severity of pain and evaluate response  - Implement non-pharmacological measures as appropriate and evaluate response  - Consider cultural and social influences on pain and pain management  - Notify physician/advanced practitioner if interventions unsuccessful or patient reports new pain  Outcome: Progressing     Problem: INFECTION - ADULT  Goal: Absence or prevention of progression during hospitalization  Description: INTERVENTIONS:  - Assess and monitor for signs and symptoms of infection  - Monitor lab/diagnostic results  - Monitor all insertion sites, i e  indwelling lines, tubes, and drains  - Monitor endotracheal if appropriate and nasal secretions for changes in amount and color  - [place] appropriate cooling/warming therapies per order  - Administer medications as ordered  - Instruct and encourage patient and family to use good hand hygiene technique  - Identify and instruct in appropriate isolation precautions for identified infection/condition  Outcome: Progressing  Goal: Absence of fever/infection during neutropenic period  Description: INTERVENTIONS:  - Monitor WBC    Outcome: Progressing     Problem: SAFETY ADULT  Goal: Patient will remain free of falls  Description: INTERVENTIONS:  - Educate patient/family on patient safety including physical limitations  - Instruct patient to call for assistance with activity   - Consult OT/PT to assist with strengthening/mobility   - Keep Call bell within reach  - Keep bed low and locked with side rails adjusted as appropriate  - Keep care items and personal belongings within reach  - Initiate and maintain comfort rounds  - Make Fall Risk Sign visible to staff  - Offer Toileting every  Hours, in advance of need  - Initiate/Maintain alarm  - Obtain necessary fall risk management equipment:   - Apply yellow socks and bracelet for high fall risk patients  - Consider moving patient to room near nurses station  Outcome: Progressing  Goal: Maintain or return to baseline ADL function  Description: INTERVENTIONS:  -  Assess patient's ability to carry out ADLs; assess patient's baseline for ADL function and identify physical deficits which impact ability to perform ADLs (bathing, care of mouth/teeth, toileting, grooming, dressing, etc )  - Assess/evaluate cause of self-care deficits   - Assess range of motion  - Assess patient's mobility; develop plan if impaired  - Assess patient's need for assistive devices and provide as appropriate  - Encourage maximum independence but intervene and supervise when necessary  - Involve family in performance of ADLs  - Assess for home care needs following discharge   - Consider OT consult to assist with ADL evaluation and planning for discharge  - Provide patient education as appropriate  Outcome: Progressing  Goal: Maintains/Returns to pre admission functional level  Description: INTERVENTIONS:  - Perform BMAT or MOVE assessment daily    - Set and communicate daily mobility goal to care team and patient/family/caregiver  - Collaborate with rehabilitation services on mobility goals if consulted  - Perform Range of Motion  times a day  - Reposition patient every  hours    - Dangle patient  times a day  - Stand patient  times a day  - Ambulate patient  times a day  - Out of bed to chair  times a day   - Out of bed for meals times a day  - Out of bed for toileting  - Record patient progress and toleration of activity level   Outcome: Progressing     Problem: DISCHARGE PLANNING  Goal: Discharge to home or other facility with appropriate resources  Description: INTERVENTIONS:  - Identify barriers to discharge w/patient and caregiver  - Arrange for needed discharge resources and transportation as appropriate  - Identify discharge learning needs (meds, wound care, etc )  - Arrange for interpretive services to assist at discharge as needed  - Refer to Case Management Department for coordinating discharge planning if the patient needs post-hospital services based on physician/advanced practitioner order or complex needs related to functional status, cognitive ability, or social support system  Outcome: Progressing     Problem: Knowledge Deficit  Goal: Patient/family/caregiver demonstrates understanding of disease process, treatment plan, medications, and discharge instructions  Description: Complete learning assessment and assess knowledge base    Interventions:  - Provide teaching at level of understanding  - Provide teaching via preferred learning methods  Outcome: Progressing     Problem: GASTROINTESTINAL - ADULT  Goal: Minimal or absence of nausea and/or vomiting  Description: INTERVENTIONS:  - Administer IV fluids if ordered to ensure adequate hydration  - Maintain NPO status until nausea and vomiting are resolved  - Nasogastric tube if ordered  - Administer ordered antiemetic medications as needed  - Provide nonpharmacologic comfort measures as appropriate  - Advance diet as tolerated, if ordered  - Consider nutrition services referral to assist patient with adequate nutrition and appropriate food choices  Outcome: Progressing  Goal: Maintains or returns to baseline bowel function  Description: INTERVENTIONS:  - Assess bowel function  - Encourage oral fluids to ensure adequate hydration  - Administer IV fluids if ordered to ensure adequate hydration  - Administer ordered medications as needed  - Encourage mobilization and activity  - Consider nutritional services referral to assist patient with adequate nutrition and appropriate food choices  Outcome: Progressing  Goal: Maintains adequate nutritional intake  Description: INTERVENTIONS:  - Monitor percentage of each meal consumed  - Identify factors contributing to decreased intake, treat as appropriate  - Assist with meals as needed  - Monitor I&O, weight, and lab values if indicated  - Obtain nutrition services referral as needed  Outcome: Progressing  Goal: Oral mucous membranes remain intact  Description: INTERVENTIONS  - Assess oral mucosa and hygiene practices  - Implement preventative oral hygiene regimen  - Implement oral medicated treatments as ordered  - Initiate Nutrition services referral as needed  Outcome: Progressing

## 2023-02-08 NOTE — ANESTHESIA POSTPROCEDURE EVALUATION
Post-Op Assessment Note    CV Status:  Stable  Pain Score: 0    Pain management: adequate     Mental Status:  Alert and awake   Hydration Status:  Euvolemic   PONV Controlled:  Controlled   Airway Patency:  Patent and adequate      Post Op Vitals Reviewed: Yes      Staff: CRNA         No notable events documented      BP   151/94   Temp 97 8   Pulse 82   Resp   20   SpO2 96

## 2023-02-08 NOTE — PLAN OF CARE
Problem: PAIN - ADULT  Goal: Verbalizes/displays adequate comfort level or baseline comfort level  Description: Interventions:  - Encourage patient to monitor pain and request assistance  - Assess pain using appropriate pain scale  - Administer analgesics based on type and severity of pain and evaluate response  - Implement non-pharmacological measures as appropriate and evaluate response  - Consider cultural and social influences on pain and pain management  - Notify physician/advanced practitioner if interventions unsuccessful or patient reports new pain  Outcome: Progressing     Problem: INFECTION - ADULT  Goal: Absence or prevention of progression during hospitalization  Description: INTERVENTIONS:  - Assess and monitor for signs and symptoms of infection  - Monitor lab/diagnostic results  - Monitor all insertion sites, i e  indwelling lines, tubes, and drains  - Monitor endotracheal if appropriate and nasal secretions for changes in amount and color  - [place] appropriate cooling/warming therapies per order  - Administer medications as ordered  - Instruct and encourage patient and family to use good hand hygiene technique  - Identify and instruct in appropriate isolation precautions for identified infection/condition  Outcome: Progressing  Goal: Absence of fever/infection during neutropenic period  Description: INTERVENTIONS:  - Monitor WBC    Outcome: Progressing     Problem: SAFETY ADULT  Goal: Patient will remain free of falls  Description: INTERVENTIONS:  - Educate patient/family on patient safety including physical limitations  - Instruct patient to call for assistance with activity   - Consult OT/PT to assist with strengthening/mobility   - Keep Call bell within reach  - Keep bed low and locked with side rails adjusted as appropriate  - Keep care items and personal belongings within reach  - Initiate and maintain comfort rounds  - Make Fall Risk Sign visible to staff  - Offer Toileting every  Hours, in advance of need  - Initiate/Maintain alarm  - Obtain necessary fall risk management equipment:   - Apply yellow socks and bracelet for high fall risk patients  - Consider moving patient to room near nurses station  Outcome: Progressing  Goal: Maintain or return to baseline ADL function  Description: INTERVENTIONS:  -  Assess patient's ability to carry out ADLs; assess patient's baseline for ADL function and identify physical deficits which impact ability to perform ADLs (bathing, care of mouth/teeth, toileting, grooming, dressing, etc )  - Assess/evaluate cause of self-care deficits   - Assess range of motion  - Assess patient's mobility; develop plan if impaired  - Assess patient's need for assistive devices and provide as appropriate  - Encourage maximum independence but intervene and supervise when necessary  - Involve family in performance of ADLs  - Assess for home care needs following discharge   - Consider OT consult to assist with ADL evaluation and planning for discharge  - Provide patient education as appropriate  Outcome: Progressing  Goal: Maintains/Returns to pre admission functional level  Description: INTERVENTIONS:  - Perform BMAT or MOVE assessment daily    - Set and communicate daily mobility goal to care team and patient/family/caregiver  - Collaborate with rehabilitation services on mobility goals if consulted  - Perform Range of Motion  times a day  - Reposition patient every  hours    - Dangle patient  times a day  - Stand patient  times a day  - Ambulate patient  times a day  - Out of bed to chair  times a day   - Out of bed for meals times a day  - Out of bed for toileting  - Record patient progress and toleration of activity level   Outcome: Progressing     Problem: DISCHARGE PLANNING  Goal: Discharge to home or other facility with appropriate resources  Description: INTERVENTIONS:  - Identify barriers to discharge w/patient and caregiver  - Arrange for needed discharge resources and transportation as appropriate  - Identify discharge learning needs (meds, wound care, etc )  - Arrange for interpretive services to assist at discharge as needed  - Refer to Case Management Department for coordinating discharge planning if the patient needs post-hospital services based on physician/advanced practitioner order or complex needs related to functional status, cognitive ability, or social support system  Outcome: Progressing     Problem: Knowledge Deficit  Goal: Patient/family/caregiver demonstrates understanding of disease process, treatment plan, medications, and discharge instructions  Description: Complete learning assessment and assess knowledge base    Interventions:  - Provide teaching at level of understanding  - Provide teaching via preferred learning methods  Outcome: Progressing     Problem: GASTROINTESTINAL - ADULT  Goal: Minimal or absence of nausea and/or vomiting  Description: INTERVENTIONS:  - Administer IV fluids if ordered to ensure adequate hydration  - Maintain NPO status until nausea and vomiting are resolved  - Nasogastric tube if ordered  - Administer ordered antiemetic medications as needed  - Provide nonpharmacologic comfort measures as appropriate  - Advance diet as tolerated, if ordered  - Consider nutrition services referral to assist patient with adequate nutrition and appropriate food choices  Outcome: Progressing  Goal: Maintains or returns to baseline bowel function  Description: INTERVENTIONS:  - Assess bowel function  - Encourage oral fluids to ensure adequate hydration  - Administer IV fluids if ordered to ensure adequate hydration  - Administer ordered medications as needed  - Encourage mobilization and activity  - Consider nutritional services referral to assist patient with adequate nutrition and appropriate food choices  Outcome: Progressing  Goal: Maintains adequate nutritional intake  Description: INTERVENTIONS:  - Monitor percentage of each meal consumed  - Identify factors contributing to decreased intake, treat as appropriate  - Assist with meals as needed  - Monitor I&O, weight, and lab values if indicated  - Obtain nutrition services referral as needed  Outcome: Progressing  Goal: Oral mucous membranes remain intact  Description: INTERVENTIONS  - Assess oral mucosa and hygiene practices  - Implement preventative oral hygiene regimen  - Implement oral medicated treatments as ordered  - Initiate Nutrition services referral as needed  Outcome: Progressing

## 2023-02-08 NOTE — CASE MANAGEMENT
Case Management Assessment & Discharge Planning Note    Patient name Maverick Dempsey  Location /-19 MRN [id number]  : 1975 Date 2023       Current Admission Date: 2023  Current Admission Diagnosis:Constipation   Patient Active Problem List    Diagnosis Date Noted   • Constipation 2023   • Right groin pain 2023   • Non-recurrent bilateral inguinal hernia without obstruction or gangrene 2023   • Tremor, essential 2022   • Cervical spondylosis 2022   • Tremor of both outstretched hands 2022   • Liver abscess 2022   • Musculoskeletal neck pain 2021   • Change in bowel habits 2020   • Prediabetes 2019   • Abdominal pain, epigastric 10/11/2019   • Pancreatitis, unspecified pancreatitis type 2019   • Hypertension 2019   • Upper abdominal pain 2019   • Elevated blood pressure reading 2019   • Healthcare maintenance 2018   • GERD (gastroesophageal reflux disease) 2018   • Hyponatremia    • Uncomplicated opioid dependence ([address] ) 2018   • Chronic gastritis without bleeding 2018   • Chronic pancreatitis ([facility] ) 2018   • Esophagitis 2018   • Hypertriglyceridemia 2018   • Generalized abdominal pain 2018   • Abnormal transaminases 2018   • Acute on chronic pancreatitis ([facility] [address] [facility] ) 2018   • Leukopenia 2018   • Leukocytosis 2017   • RBBB 2017   • Epigastric pain 11/15/2017   • Renal mass 2017   • Encounter for smoking cessation counseling 2017   • Pancreatic lesion 2017      LOS (days): 0  Geometric Mean LOS (GMLOS) (days):   Days to GMLOS:     OBJECTIVE:              Current admission status: Observation       [facility] Pharm[facility]   [facility] [place], [address]  [place] [address]  Phone: [phone] Fax: [phone]    Primary Care Provider: LUANA Marroquin    Primary Insurance: BLUE CROSS  Secondary Insurance:     ASSESSMENT:  Active Health Care Proxies     [address] - Spouse   Primary Phone: [phone] (Mobile)               Advance Directives  Does patient have a [address]?: No  Was patient offered paperwork?: Yes (not interested)  Does patient currently have a Health Care decision maker?: Yes, please see Health Care Proxy section  Does patient have Advance Directives?: No  Was patient offered paperwork?: Yes         Readmission Root Cause  30 Day Readmission: No    Patient Information  Admitted from[de-identified] Home  Mental Status: Alert  During Assessment patient was accompanied by: Spouse  Assessment information provided by[de-identified] Patient  Primary Caregiver: Self  Support Systems: Spouse/significant other  [place] of Residence: [place] [address] do you live in?: [facility] entry access options   Select all that apply : Stairs  Number of steps to enter home : One Flight  Do the steps have railings?: Yes  Type of Current Residence: [address] [place] home  Upon entering residence, is there a bedroom on the main floor (no further steps)?: No  A bedroom is located on the following floor levels of residence (select all that apply):: 2nd Floor  Upon entering residence, is there a bathroom on the main floor (no further steps)?: Yes  Number of steps to 2nd floor from main floor: One Flight  In the last 12 months, was there a time when you were not able to pay the mortgage or rent on time?: No  In the last 12 months, how many places have you lived?: 1  In the last 12 months, was there a time when you did not have a steady place to sleep or slept in a shelter (including now)?: No  Homeless/housing insecurity resource given?: No  Living Arrangements: Lives w/ Spouse/significant other  Is patient a ?: No    Activities of Daily Living Prior to Admission  Functional Status: Independent  Completes ADLs independently?: Yes  Ambulates independently?: Yes  Does patient use assisted devices?: No  Does patient currently own DME?: No  Does patient have a history of Outpatient Therapy (PT/OT)?: Yes  Does the patient have a history of Short-Term Rehab?: No  Does patient have a history of HHC?: No         Patient Information Continued  Income Source: Employed  Does patient have prescription coverage?: Yes  Within the past 12 months, you worried that your food would run out before you got the money to buy more : Never true  Within the past 12 months, the food you bought just didn't last and you didn't have money to get more : Never true  Food insecurity resource given?: N/A  Does patient receive dialysis treatments?: No  Does patient have a history of substance abuse?: No  Does patient have a history of Mental Health Diagnosis?: No         Means of Transportation  Means of Transport to Appts[de-identified] Drives Self  In the past 12 months, has lack of transportation kept you from medical appointments or from getting medications?: No  In the past 12 months, has lack of transportation kept you from meetings, work, or from getting things needed for daily living?: No  Was application for public transport provided?: N/A        DISCHARGE DETAILS:    Discharge planning discussed with[de-identified] Patient  Freedom of Choice: Yes     CM contacted family/caregiver?: Yes  Were Treatment Team discharge recommendations reviewed with patient/caregiver?: Yes  Did patient/caregiver verbalize understanding of patient care needs?: Yes  Were patient/caregiver advised of the risks associated with not following Treatment Team discharge recommendations?: Yes    Contacts  Patient Contacts: wife  Relationship to Patient[de-identified] Family  Contact Method:  In Person  Reason/Outcome: Continuity of [address]         Is the patient interested in [facility] at discharge?: No    DME Referral Provided  Referral made for DME?: No    Other Referral/Resources/Interventions Provided:  Referral Comments: No Dc needs anticipated         Treatment Team Recommendation: Home  Discharge Destination Plan[de-identified] Home  Transport at Discharge : Automobile, Family

## 2023-02-09 ENCOUNTER — APPOINTMENT (INPATIENT)
Dept: RADIOLOGY | Facility: HOSPITAL | Age: 48
End: 2023-02-09

## 2023-02-09 LAB
ANION GAP SERPL CALCULATED.3IONS-SCNC: 8 MMOL/L (ref 4–13)
BASOPHILS # BLD AUTO: 0.03 THOUSANDS/ÂΜL (ref 0–0.1)
BASOPHILS NFR BLD AUTO: 1 % (ref 0–1)
BUN SERPL-MCNC: 5 MG/DL (ref 5–25)
C DIFF TOX GENS STL QL NAA+PROBE: NEGATIVE
CALCIUM SERPL-MCNC: 8.7 MG/DL (ref 8.3–10.1)
CHLORIDE SERPL-SCNC: 101 MMOL/L (ref 96–108)
CO2 SERPL-SCNC: 24 MMOL/L (ref 21–32)
CREAT SERPL-MCNC: 0.45 MG/DL (ref 0.6–1.3)
EOSINOPHIL # BLD AUTO: 0.14 THOUSAND/ÂΜL (ref 0–0.61)
EOSINOPHIL NFR BLD AUTO: 3 % (ref 0–6)
ERYTHROCYTE [DISTWIDTH] IN BLOOD BY AUTOMATED COUNT: 12.4 % (ref 11.6–15.1)
GFR SERPL CREATININE-BSD FRML MDRD: 132 ML/MIN/1.73SQ M
GLUCOSE SERPL-MCNC: 129 MG/DL (ref 65–140)
HCT VFR BLD AUTO: 37.7 % (ref 36.5–49.3)
HGB BLD-MCNC: 12.5 G/DL (ref 12–17)
IMM GRANULOCYTES # BLD AUTO: 0.03 THOUSAND/UL (ref 0–0.2)
IMM GRANULOCYTES NFR BLD AUTO: 1 % (ref 0–2)
LYMPHOCYTES # BLD AUTO: 1.56 THOUSANDS/ÂΜL (ref 0.6–4.47)
LYMPHOCYTES NFR BLD AUTO: 34 % (ref 14–44)
MAGNESIUM SERPL-MCNC: 2.1 MG/DL (ref 1.6–2.6)
MCH RBC QN AUTO: 33.8 PG (ref 26.8–34.3)
MCHC RBC AUTO-ENTMCNC: 33.2 G/DL (ref 31.4–37.4)
MCV RBC AUTO: 102 FL (ref 82–98)
MONOCYTES # BLD AUTO: 0.69 THOUSAND/ÂΜL (ref 0.17–1.22)
MONOCYTES NFR BLD AUTO: 15 % (ref 4–12)
NEUTROPHILS # BLD AUTO: 2.2 THOUSANDS/ÂΜL (ref 1.85–7.62)
NEUTS SEG NFR BLD AUTO: 46 % (ref 43–75)
NRBC BLD AUTO-RTO: 0 /100 WBCS
PHOSPHATE SERPL-MCNC: 4 MG/DL (ref 2.7–4.5)
PLATELET # BLD AUTO: 97 THOUSANDS/UL (ref 149–390)
PMV BLD AUTO: 10.6 FL (ref 8.9–12.7)
POTASSIUM SERPL-SCNC: 4.4 MMOL/L (ref 3.5–5.3)
RBC # BLD AUTO: 3.7 MILLION/UL (ref 3.88–5.62)
SODIUM SERPL-SCNC: 133 MMOL/L (ref 135–147)
WBC # BLD AUTO: 4.65 THOUSAND/UL (ref 4.31–10.16)

## 2023-02-09 RX ADMIN — HEPARIN SODIUM 5000 UNITS: 5000 INJECTION INTRAVENOUS; SUBCUTANEOUS at 22:05

## 2023-02-09 RX ADMIN — DEXTROSE, SODIUM CHLORIDE, AND POTASSIUM CHLORIDE 125 ML/HR: 5; .45; .15 INJECTION INTRAVENOUS at 04:16

## 2023-02-09 RX ADMIN — DEXTROSE, SODIUM CHLORIDE, AND POTASSIUM CHLORIDE 125 ML/HR: 5; .45; .15 INJECTION INTRAVENOUS at 13:20

## 2023-02-09 RX ADMIN — HEPARIN SODIUM 5000 UNITS: 5000 INJECTION INTRAVENOUS; SUBCUTANEOUS at 07:02

## 2023-02-09 RX ADMIN — FAMOTIDINE 20 MG: 10 INJECTION, SOLUTION INTRAVENOUS at 10:39

## 2023-02-09 RX ADMIN — HYDROMORPHONE HYDROCHLORIDE 0.5 MG: 1 INJECTION, SOLUTION INTRAMUSCULAR; INTRAVENOUS; SUBCUTANEOUS at 13:20

## 2023-02-09 RX ADMIN — FAMOTIDINE 20 MG: 10 INJECTION, SOLUTION INTRAVENOUS at 22:04

## 2023-02-09 RX ADMIN — HEPARIN SODIUM 5000 UNITS: 5000 INJECTION INTRAVENOUS; SUBCUTANEOUS at 16:11

## 2023-02-09 RX ADMIN — HYDROMORPHONE HYDROCHLORIDE 0.5 MG: 1 INJECTION, SOLUTION INTRAMUSCULAR; INTRAVENOUS; SUBCUTANEOUS at 09:02

## 2023-02-09 RX ADMIN — HYDROMORPHONE HYDROCHLORIDE 0.5 MG: 1 INJECTION, SOLUTION INTRAMUSCULAR; INTRAVENOUS; SUBCUTANEOUS at 17:37

## 2023-02-09 RX ADMIN — OXYCODONE HYDROCHLORIDE 10 MG: 10 TABLET ORAL at 22:04

## 2023-02-09 NOTE — PROGRESS NOTES
Progress Note - Trudi Collet 50 y o  male MRN: [id number]    Unit/Bed#: -01 Encounter: [id number]        Subjective:     Patient reports right sided groin pain  Otherwise, he reports his generalized abdominal pain is much improved today  He is passing gas  No bowel movements overnight  ROS: As noted in the HPI, otherwise all others negative  Objective:     Vitals: Blood pressure 141/79, pulse 75, temperature 98 2 °F (36 8 °C), resp  rate 16, SpO2 97 %  ,There is no height or weight on file to calculate BMI  Intake/Output Summary (Last 24 hours) at 2/9/2023 1027  Last data filed at 2/9/2023 0902  Gross per 24 hour   Intake 2852 09 ml   Output 2150 ml   Net 702 09 ml       Physical Exam:     General Appearance: Alert and oriented x 3   In no respiratory distress  Lungs: Clear to auscultation bilaterally, no rales or rhonchi  Heart: Regular rate and rhythm, S1, S2 normal, no murmur, click, rub or gallop  Abdomen: Soft, non-tender, abdominal mildly distended, improvement in the lower abdomen; no masses or no organomegaly  Extremities: No cyanosis, edema    Invasive Devices     Peripheral Intravenous Line  Duration           Peripheral IV 02/06/23 Left Forearm 2 days    Peripheral IV 02/07/23 Right;Ventral (anterior) Forearm 2 days          Drain  Duration           Rectal Tube With balloon 1 day    Rectal Tube Without balloon 1 day                Lab Results:  Results from last 7 days   Lab Units 02/09/23  0538   WBC Thousand/uL 4 65   HEMOGLOBIN g/dL 12 5   HEMATOCRIT % 37 7   PLATELETS Thousands/uL 97*   NEUTROS PCT % 46   LYMPHS PCT % 34   MONOS PCT % 15*   EOS PCT % 3     Results from last 7 days   Lab Units 02/09/23  0538 02/08/23  0508 02/07/23  0420   POTASSIUM mmol/L 4 4   < > 3 7   CHLORIDE mmol/L 101   < > 99   CO2 mmol/L 24   < > 28   BUN mg/dL 5   < > 13   CREATININE mg/dL 0 45*   < > 0 50*   CALCIUM mg/dL 8 7   < > 8 1*   ALK PHOS U/L  --   --  148*   ALT U/L  --   --  73   AST U/L  -- --  66*    < > = values in this interval not displayed  Results from last 7 days   Lab Units 02/06/23  2113   LIPASE u/L 68*       Imaging Studies: I have personally reviewed pertinent imaging studies  Colonoscopy    Result Date: 2/8/2023  Impression: 1  Small bowel and colonic ileus, status post decompression and placement of a colonic decompression tube RECOMMENDATION: 1  Do not place this decompression tube on intermittent suction 2  Manual suction will be performed in the event that the patient is redeveloping colonic ileus 3  Decompression tube has been taped to the leg  Genny Sepulveda, DO Cite Johann Slim, FACP    XR abdomen 1 view kub    Result Date: 2/9/2023  Impression: Status post insertion of the colonic decompression tube, with tip in the transverse colon  Diffuse large bowel distention again seen consistent with ileus, improved from priors  Workstation performed: [id number]     XR abdomen 1 view kub    Result Date: 2/8/2023  Impression: Dilatation of the ascending and transverse colon with a maximum diameter of the ascending colon of up to 8 cm  Close follow-up recommended  The study was marked in EPIC for significant notification  Workstation performed: [id number]     XR abdomen 1 view kub    Result Date: 2/8/2023  Impression: Nonobstructive bowel gas pattern with gas-filled the small bowel loops and colonic loops compatible with ileus Gas-filled cecum noted with the diameter measuring 9 5 cm Workstation performed: [id number]     FL < 1 hour    Result Date: 2/9/2023  Impression: Fluoroscopic guidance provided for procedure guidance  Please refer to the separate procedure notes for additional details  Workstation performed: [id number]     CT abdomen pelvis with contrast    Result Date: 2/6/2023  Impression: Liquid stool within the colon with marked distention of the colon  Ascending colon measuring up to 7 cm  Findings may represent colitis    Given the size of the colon patient is at a high risk for perforation  Pneumoperitoneum which may be due to prior surgery however cannot rule out postsurgical complication or other etiologies  Cirrhotic liver with signs of portal hypertension  Thickening of the urinary bladder wall with surrounding inflammatory changes likely representing cystitis  Follow-up with urology is recommended  I personally discussed this study with Shasta Conrad on 2/6/2023 at 10:46 PM  Workstation performed: [id number]         Assessment and Plan:     1) Colonic distention, colitis on imaging - Began post-op inguinal hernia repair  Cecum measured 9 5 cm yesterday on KUB  Remaining colon around 7-8 cm  Patient underwent colonoscopy with decompression yesterday with insertion of a decompression tube in the right colon in case patient would need further decompression  KUB this AM read pending, however on exam abdomen is soft  Patient also reporting much less generalized abdominal pain  - Encouraged patient to be up and out of bed to promote motility   - Frequent abdominal exams and KUB every 12 hours   - C diff PCR pending, however no sign of colitis during colonoscopy   - Continue to monitor and replete potassium, calcium, and magnesium as necessary   - CLD as tolerated       2) Cirrhosis on imaging - Noted on MRI last year and again on CT on admission  Patient had no signs of portal hypertension on his endoscopic ultrasound 1 year ago  He was referred to hepatology, does not appear that he had an appointment  He does have history of fatty liver disease from hypertriglyceridemia  Patient aware    - Once above improves, we can consider repeat MRI to investigate   - If consistent with cirrhosis, would need an eventual EGD screening for varices

## 2023-02-09 NOTE — PLAN OF CARE
Problem: PAIN - ADULT  Goal: Verbalizes/displays adequate comfort level or baseline comfort level  Description: Interventions:  - Encourage patient to monitor pain and request assistance  - Assess pain using appropriate pain scale  - Administer analgesics based on type and severity of pain and evaluate response  - Implement non-pharmacological measures as appropriate and evaluate response  - Consider cultural and social influences on pain and pain management  - Notify physician/advanced practitioner if interventions unsuccessful or patient reports new pain  Outcome: Progressing     Problem: INFECTION - ADULT  Goal: Absence or prevention of progression during hospitalization  Description: INTERVENTIONS:  - Assess and monitor for signs and symptoms of infection  - Monitor lab/diagnostic results  - Monitor all insertion sites, i e  indwelling lines, tubes, and drains  - Monitor endotracheal if appropriate and nasal secretions for changes in amount and color  - [place] appropriate cooling/warming therapies per order  - Administer medications as ordered  - Instruct and encourage patient and family to use good hand hygiene technique  - Identify and instruct in appropriate isolation precautions for identified infection/condition  Outcome: Progressing     Problem: GASTROINTESTINAL - ADULT  Goal: Minimal or absence of nausea and/or vomiting  Description: INTERVENTIONS:  - Administer IV fluids if ordered to ensure adequate hydration  - Maintain NPO status until nausea and vomiting are resolved  - Nasogastric tube if ordered  - Administer ordered antiemetic medications as needed  - Provide nonpharmacologic comfort measures as appropriate  - Advance diet as tolerated, if ordered  - Consider nutrition services referral to assist patient with adequate nutrition and appropriate food choices  Outcome: Progressing

## 2023-02-09 NOTE — PROGRESS NOTES
Progress Note -Surgery PA  Bentley Quintanilla 50 y o  male MRN: [id number]  Unit/Bed#: -01 Encounter: [id number]      Assessment   51y M with colonic distension 2/2 colonic ileus s/p colonic decompression with colonic decompression tube placement 2/8/2023  -Passing flatus today and notable improvement in abdominal pain  No bowel movement  Tolerating clears  -KUB today shows improvement in large bowel distention but continued distention      Plan   -- Continue with clear liquid diet as tolerated  Patient encouraged to go slow with diet  Likely advance tomorrow  -- Encourage ambulation and out of bed as much as tolerated  -- Continue to monitor abdominal exams and bowel function  -- Decrease IV fluids  -- Ice to scrotum  -- Continue with Colonic decompression per GI  -- Monitor labs  -- Patient discussed with Dr Lakisha Benson  ______________________________________________________________________  Subjective:   Reports continued right lower quadrant pain but significantly improved  No nausea or vomiting  Tolerating clear liquids  Passing flatus  Reports bruising and tenderness of scrotumr    Objective:    Vitals:  /79   Pulse 75   Temp 98 2 °F (36 8 °C)   Resp 16   SpO2 97%     I/Os:  I/O last 3 completed shifts:   In: 5981 3 [I V :5981 3]  Out: 1200 [Urine:1200]    I/O this shift:  In: 1000 [I V :1000]  Out: 950 [Urine:950]    Invasive Devices     Peripheral Intravenous Line  Duration           Peripheral IV 02/06/23 Left Forearm 2 days    Peripheral IV 02/07/23 Right;Ventral (anterior) Forearm 2 days          Drain  Duration           Rectal Tube With balloon 1 day    Rectal Tube Without balloon 1 day                Medications:  Current Facility-Administered Medications   Medication Dose Route Frequency   • acetaminophen (TYLENOL) tablet 650 mg  650 mg Oral Q6H PRN   • dextrose 5 % and sodium chloride 0 45 % with KCl 20 mEq/L infusion  125 mL/hr Intravenous Continuous   • Famotidine (PF) (PEPCID) injection 20 mg  20 mg Intravenous Q12H [address]   • heparin (porcine) subcutaneous injection 5,000 Units  5,000 Units Subcutaneous Q8H [address]   • HYDROmorphone (DILAUDID) injection 0 5 mg  0 5 mg Intravenous Q4H PRN   • ondansetron (ZOFRAN) injection 4 mg  4 mg Intravenous Q6H PRN   • oxyCODONE (ROXICODONE) immediate release tablet 10 mg  10 mg Oral Q4H PRN   • oxyCODONE (ROXICODONE) IR tablet 5 mg  5 mg Oral Q4H PRN                 Lab Results and Cultures:   CBC with diff:   Lab Results   Component Value Date    WBC 4 65 02/09/2023    HGB 12 5 02/09/2023    HCT 37 7 02/09/2023     (H) 02/09/2023    PLT 97 (L) 02/09/2023    MCH 33 8 02/09/2023    MCHC 33 2 02/09/2023    RDW 12 4 02/09/2023    MPV 10 6 02/09/2023    NRBC 0 02/09/2023       BMP/CMP:  Lab Results   Component Value Date     04/26/2017    K 4 4 02/09/2023    K 4 2 01/23/2023     02/09/2023    CL 98 01/23/2023    CO2 24 02/09/2023    CO2 29 01/23/2023    BUN 5 02/09/2023    BUN 14 01/23/2023    CREATININE 0 45 (L) 02/09/2023    CREATININE 0 80 04/26/2017    CALCIUM 8 7 02/09/2023    CALCIUM 9 5 01/23/2023    AST 66 (H) 02/07/2023    AST 59 (H) 01/23/2023    ALT 73 02/07/2023    ALT 48 (H) 01/23/2023    ALKPHOS 148 (H) 02/07/2023    ALKPHOS 138 (H) 01/23/2023    PROT 7 3 04/26/2017    BILITOT 1 8 (H) 04/26/2017    EGFR 132 02/09/2023       Lipid Panel:   Lab Results   Component Value Date    CHOL 114 (L) 04/26/2017       Coags:   Lab Results   Component Value Date    PTT 36 05/14/2020    INR 1 1 01/23/2023    INR 1 27 (H) 05/14/2020        Urinalysis:   Lab Results   Component Value Date    COLORU Yellow 02/06/2023    COLORU YELLOW 09/15/2016    CLARITYU Clear 02/06/2023    SPECGRAV >=1 050 (H) 02/06/2023    SPECGRAV 1 007 09/15/2016    PHUR 7 0 02/06/2023    PHUR 6 5 09/11/2018    PHUR 7 0 09/15/2016    LEUKOCYTESUR Negative 02/06/2023    LEUKOCYTESUR NEGATIVE 09/15/2016    NITRITE Negative 02/06/2023    NITRITE NEGATIVE 09/15/2016 PROTEINUA NEGATIVE 09/15/2016    GLUCOSEU 1000 (1%) (A) 02/06/2023    KETONESU Trace (A) 02/06/2023    KETONESU NEGATIVE 09/15/2016    BILIRUBINUR Negative 02/06/2023    BILIRUBINUR NEGATIVE 09/15/2016    BLOODU Negative 02/06/2023        Urine Culture: No results found for: URINECX   Wound Culure: No results found for: WOUNDCULT  Blood Culture:   Lab Results   Component Value Date    BLOODCX No Growth After 5 Days  08/20/2019    BLOODCX No Growth After 5 Days  08/20/2019       Physical Exam:  General Appearance:    Alert and orientated x 3, cooperative, no distress, appears stated age   Lungs:     Clear to auscultation bilaterally, respirations unlabored, no wheezes    Heart:    Regular rate and rhythm, S1 and S2 normal, no murmur   Abdomen:    Scrotum:    Normoactive BS, soft, Mild ttp of RLQ, mod distension, non rigid, no masses, no palpated organomegaly  Ecchymosis, consistent with recent hernia repair, no edema or fluctuance   Extremities:  Extremities normal, no calf tenderness, no cyanosis or edema   Pulses:   2+ and symmetric all extremities   Skin:   Skin color, texture, turgor normal, no rashes   Neurologic:   CNII-XII intact, normal strength, affect appropriate       Imaging:  Colonoscopy    Result Date: 2/8/2023  Impression: 1  Small bowel and colonic ileus, status post decompression and placement of a colonic decompression tube RECOMMENDATION: 1  Do not place this decompression tube on intermittent suction 2  Manual suction will be performed in the event that the patient is redeveloping colonic ileus 3  Decompression tube has been taped to the leg  Rebecca Haleigh, DO Cite Johann Jeffries, FACP    XR abdomen 1 view kub    Result Date: 2/9/2023  Impression: Status post insertion of the colonic decompression tube, with tip in the transverse colon  Diffuse large bowel distention again seen consistent with ileus, improved from priors   Workstation performed: [id number]     XR abdomen 1 view kub    Result Date: 2/8/2023  Impression: Dilatation of the ascending and transverse colon with a maximum diameter of the ascending colon of up to 8 cm  Close follow-up recommended  The study was marked in EPIC for significant notification  Workstation performed: [id number]     XR abdomen 1 view kub    Result Date: 2/8/2023  Impression: Nonobstructive bowel gas pattern with gas-filled the small bowel loops and colonic loops compatible with ileus Gas-filled cecum noted with the diameter measuring 9 5 cm Workstation performed: [id number]     FL < 1 hour    Result Date: 2/9/2023  Impression: Fluoroscopic guidance provided for procedure guidance  Please refer to the separate procedure notes for additional details  Workstation performed: [id number]     CT abdomen pelvis with contrast    Result Date: 2/6/2023  Impression: Liquid stool within the colon with marked distention of the colon  Ascending colon measuring up to 7 cm  Findings may represent colitis  Given the size of the colon patient is at a high risk for perforation  Pneumoperitoneum which may be due to prior surgery however cannot rule out postsurgical complication or other etiologies  Cirrhotic liver with signs of portal hypertension  Thickening of the urinary bladder wall with surrounding inflammatory changes likely representing cystitis  Follow-up with urology is recommended    I personally discussed this study with Ernst Boston on 2/6/2023 at 10:46 PM  Workstation performed: [id number]       VTE Pharmacologic Prophylaxis: Heparin  VTE Mechanical Prophylaxis: sequential compression device    Rishabh Brooks PA-C   2/9/2023

## 2023-02-09 NOTE — ANESTHESIA PREPROCEDURE EVALUATION
Late entry  Procedure:  COLONOSCOPY    Relevant Problems   CARDIO   (+) Hypertension   (+) Hypertriglyceridemia   (+) RBBB      GI/HEPATIC   (+) Acute on chronic pancreatitis (HCC)   (+) Chronic pancreatitis (HCC)   (+) GERD (gastroesophageal reflux disease)   (+) Liver abscess   (+) Pancreatic lesion   (+) Pancreatitis, unspecified pancreatitis type      MUSCULOSKELETAL   (+) Cervical spondylosis      Colonic dilation with dilated small bowel   Physical Exam    Airway    Mallampati score: II  TM Distance: >3 FB  Neck ROM: full     Dental       Cardiovascular  Cardiovascular exam normal    Pulmonary  Pulmonary exam normal     Other Findings        Anesthesia Plan  ASA Score- 3 Emergent    Anesthesia Type- general with ASA Monitors  Additional Monitors:   Airway Plan: ETT  Plan Factors-Exercise tolerance (METS): >4 METS  Chart reviewed  EKG reviewed  Imaging results reviewed  Existing labs reviewed  Patient summary reviewed  Patient is not a current smoker  Induction- rapid sequence induction  Postoperative Plan-     Informed Consent- Anesthetic plan and risks discussed with patient  I personally reviewed this patient with the CRNA  Discussed and agreed on the Anesthesia Plan with the CRNA  Leeanna Barfield

## 2023-02-09 NOTE — PLAN OF CARE
Problem: PAIN - ADULT  Goal: Verbalizes/displays adequate comfort level or baseline comfort level  Description: Interventions:  - Encourage patient to monitor pain and request assistance  - Assess pain using appropriate pain scale  - Administer analgesics based on type and severity of pain and evaluate response  - Implement non-pharmacological measures as appropriate and evaluate response  - Consider cultural and social influences on pain and pain management  - Notify physician/advanced practitioner if interventions unsuccessful or patient reports new pain  Outcome: Progressing     Problem: INFECTION - ADULT  Goal: Absence or prevention of progression during hospitalization  Description: INTERVENTIONS:  - Assess and monitor for signs and symptoms of infection  - Monitor lab/diagnostic results  - Monitor all insertion sites, i e  indwelling lines, tubes, and drains  - Monitor endotracheal if appropriate and nasal secretions for changes in amount and color  - [place] appropriate cooling/warming therapies per order  - Administer medications as ordered  - Instruct and encourage patient and family to use good hand hygiene technique  - Identify and instruct in appropriate isolation precautions for identified infection/condition  Outcome: Progressing  Goal: Absence of fever/infection during neutropenic period  Description: INTERVENTIONS:  - Monitor WBC    Outcome: Progressing     Problem: SAFETY ADULT  Goal: Patient will remain free of falls  Description: INTERVENTIONS:  - Educate patient/family on patient safety including physical limitations  - Instruct patient to call for assistance with activity   - Consult OT/PT to assist with strengthening/mobility   - Keep Call bell within reach  - Keep bed low and locked with side rails adjusted as appropriate  - Keep care items and personal belongings within reach  - Initiate and maintain comfort rounds  - Make Fall Risk Sign visible to staff  - Offer Toileting every  Hours, in advance of need  - Initiate/Maintain alarm  - Obtain necessary fall risk management equipment:   - Apply yellow socks and bracelet for high fall risk patients  - Consider moving patient to room near nurses station  Outcome: Progressing  Goal: Maintain or return to baseline ADL function  Description: INTERVENTIONS:  -  Assess patient's ability to carry out ADLs; assess patient's baseline for ADL function and identify physical deficits which impact ability to perform ADLs (bathing, care of mouth/teeth, toileting, grooming, dressing, etc )  - Assess/evaluate cause of self-care deficits   - Assess range of motion  - Assess patient's mobility; develop plan if impaired  - Assess patient's need for assistive devices and provide as appropriate  - Encourage maximum independence but intervene and supervise when necessary  - Involve family in performance of ADLs  - Assess for home care needs following discharge   - Consider OT consult to assist with ADL evaluation and planning for discharge  - Provide patient education as appropriate  Outcome: Progressing  Goal: Maintains/Returns to pre admission functional level  Description: INTERVENTIONS:  - Perform BMAT or MOVE assessment daily    - Set and communicate daily mobility goal to care team and patient/family/caregiver  - Collaborate with rehabilitation services on mobility goals if consulted  - Perform Range of Motion  times a day  - Reposition patient every  hours    - Dangle patient  times a day  - Stand patient  times a day  - Ambulate patient  times a day  - Out of bed to chair  times a day   - Out of bed for meal times a day  - Out of bed for toileting  - Record patient progress and toleration of activity level   Outcome: Progressing     Problem: DISCHARGE PLANNING  Goal: Discharge to home or other facility with appropriate resources  Description: INTERVENTIONS:  - Identify barriers to discharge w/patient and caregiver  - Arrange for needed discharge resources and transportation as appropriate  - Identify discharge learning needs (meds, wound care, etc )  - Arrange for interpretive services to assist at discharge as needed  - Refer to Case Management Department for coordinating discharge planning if the patient needs post-hospital services based on physician/advanced practitioner order or complex needs related to functional status, cognitive ability, or social support system  Outcome: Progressing     Problem: Knowledge Deficit  Goal: Patient/family/caregiver demonstrates understanding of disease process, treatment plan, medications, and discharge instructions  Description: Complete learning assessment and assess knowledge base    Interventions:  - Provide teaching at level of understanding  - Provide teaching via preferred learning methods  Outcome: Progressing     Problem: GASTROINTESTINAL - ADULT  Goal: Minimal or absence of nausea and/or vomiting  Description: INTERVENTIONS:  - Administer IV fluids if ordered to ensure adequate hydration  - Maintain NPO status until nausea and vomiting are resolved  - Nasogastric tube if ordered  - Administer ordered antiemetic medications as needed  - Provide nonpharmacologic comfort measures as appropriate  - Advance diet as tolerated, if ordered  - Consider nutrition services referral to assist patient with adequate nutrition and appropriate food choices  Outcome: Progressing  Goal: Maintains or returns to baseline bowel function  Description: INTERVENTIONS:  - Assess bowel function  - Encourage oral fluids to ensure adequate hydration  - Administer IV fluids if ordered to ensure adequate hydration  - Administer ordered medications as needed  - Encourage mobilization and activity  - Consider nutritional services referral to assist patient with adequate nutrition and appropriate food choices  Outcome: Progressing  Goal: Maintains adequate nutritional intake  Description: INTERVENTIONS:  - Monitor percentage of each meal consumed  - Identify factors contributing to decreased intake, treat as appropriate  - Assist with meals as needed  - Monitor I&O, weight, and lab values if indicated  - Obtain nutrition services referral as needed  Outcome: Progressing  Goal: Oral mucous membranes remain intact  Description: INTERVENTIONS  - Assess oral mucosa and hygiene practices  - Implement preventative oral hygiene regimen  - Implement oral medicated treatments as ordered  - Initiate Nutrition services referral as needed  Outcome: Progressing

## 2023-02-10 ENCOUNTER — APPOINTMENT (INPATIENT)
Dept: RADIOLOGY | Facility: HOSPITAL | Age: 48
End: 2023-02-10

## 2023-02-10 ENCOUNTER — TELEPHONE (OUTPATIENT)
Dept: GASTROENTEROLOGY | Facility: CLINIC | Age: 48
End: 2023-02-10

## 2023-02-10 LAB
ANION GAP SERPL CALCULATED.3IONS-SCNC: 9 MMOL/L (ref 4–13)
BASOPHILS # BLD AUTO: 0.03 THOUSANDS/ÂΜL (ref 0–0.1)
BASOPHILS NFR BLD AUTO: 1 % (ref 0–1)
BUN SERPL-MCNC: 5 MG/DL (ref 5–25)
CALCIUM SERPL-MCNC: 9.2 MG/DL (ref 8.3–10.1)
CHLORIDE SERPL-SCNC: 100 MMOL/L (ref 96–108)
CO2 SERPL-SCNC: 27 MMOL/L (ref 21–32)
CREAT SERPL-MCNC: 0.52 MG/DL (ref 0.6–1.3)
EOSINOPHIL # BLD AUTO: 0.18 THOUSAND/ÂΜL (ref 0–0.61)
EOSINOPHIL NFR BLD AUTO: 3 % (ref 0–6)
ERYTHROCYTE [DISTWIDTH] IN BLOOD BY AUTOMATED COUNT: 12.4 % (ref 11.6–15.1)
GFR SERPL CREATININE-BSD FRML MDRD: 125 ML/MIN/1.73SQ M
GLUCOSE SERPL-MCNC: 105 MG/DL (ref 65–140)
GLUCOSE SERPL-MCNC: 107 MG/DL (ref 65–140)
HCT VFR BLD AUTO: 37.8 % (ref 36.5–49.3)
HGB BLD-MCNC: 12.5 G/DL (ref 12–17)
IMM GRANULOCYTES # BLD AUTO: 0.04 THOUSAND/UL (ref 0–0.2)
IMM GRANULOCYTES NFR BLD AUTO: 1 % (ref 0–2)
LYMPHOCYTES # BLD AUTO: 1.78 THOUSANDS/ÂΜL (ref 0.6–4.47)
LYMPHOCYTES NFR BLD AUTO: 34 % (ref 14–44)
MCH RBC QN AUTO: 33.7 PG (ref 26.8–34.3)
MCHC RBC AUTO-ENTMCNC: 33.1 G/DL (ref 31.4–37.4)
MCV RBC AUTO: 102 FL (ref 82–98)
MONOCYTES # BLD AUTO: 0.75 THOUSAND/ÂΜL (ref 0.17–1.22)
MONOCYTES NFR BLD AUTO: 14 % (ref 4–12)
NEUTROPHILS # BLD AUTO: 2.44 THOUSANDS/ÂΜL (ref 1.85–7.62)
NEUTS SEG NFR BLD AUTO: 47 % (ref 43–75)
NRBC BLD AUTO-RTO: 0 /100 WBCS
PLATELET # BLD AUTO: 118 THOUSANDS/UL (ref 149–390)
PMV BLD AUTO: 10 FL (ref 8.9–12.7)
POTASSIUM SERPL-SCNC: 4.1 MMOL/L (ref 3.5–5.3)
RBC # BLD AUTO: 3.71 MILLION/UL (ref 3.88–5.62)
SODIUM SERPL-SCNC: 136 MMOL/L (ref 135–147)
WBC # BLD AUTO: 5.22 THOUSAND/UL (ref 4.31–10.16)

## 2023-02-10 RX ORDER — POLYETHYLENE GLYCOL 3350 17 G/17G
17 POWDER, FOR SOLUTION ORAL DAILY
Status: DISCONTINUED | OUTPATIENT
Start: 2023-02-10 | End: 2023-02-12

## 2023-02-10 RX ADMIN — ONDANSETRON 4 MG: 2 INJECTION INTRAMUSCULAR; INTRAVENOUS at 19:54

## 2023-02-10 RX ADMIN — OXYCODONE HYDROCHLORIDE 10 MG: 10 TABLET ORAL at 09:15

## 2023-02-10 RX ADMIN — FAMOTIDINE 20 MG: 10 INJECTION, SOLUTION INTRAVENOUS at 22:03

## 2023-02-10 RX ADMIN — HEPARIN SODIUM 5000 UNITS: 5000 INJECTION INTRAVENOUS; SUBCUTANEOUS at 22:02

## 2023-02-10 RX ADMIN — HYDROMORPHONE HYDROCHLORIDE 0.5 MG: 1 INJECTION, SOLUTION INTRAMUSCULAR; INTRAVENOUS; SUBCUTANEOUS at 00:18

## 2023-02-10 RX ADMIN — HYDROMORPHONE HYDROCHLORIDE 0.5 MG: 1 INJECTION, SOLUTION INTRAMUSCULAR; INTRAVENOUS; SUBCUTANEOUS at 22:02

## 2023-02-10 RX ADMIN — HYDROMORPHONE HYDROCHLORIDE 0.5 MG: 1 INJECTION, SOLUTION INTRAMUSCULAR; INTRAVENOUS; SUBCUTANEOUS at 05:25

## 2023-02-10 RX ADMIN — OXYCODONE HYDROCHLORIDE 10 MG: 10 TABLET ORAL at 19:53

## 2023-02-10 RX ADMIN — FAMOTIDINE 20 MG: 10 INJECTION, SOLUTION INTRAVENOUS at 09:16

## 2023-02-10 RX ADMIN — HEPARIN SODIUM 5000 UNITS: 5000 INJECTION INTRAVENOUS; SUBCUTANEOUS at 05:25

## 2023-02-10 RX ADMIN — HEPARIN SODIUM 5000 UNITS: 5000 INJECTION INTRAVENOUS; SUBCUTANEOUS at 14:37

## 2023-02-10 RX ADMIN — HYDROMORPHONE HYDROCHLORIDE 0.5 MG: 1 INJECTION, SOLUTION INTRAMUSCULAR; INTRAVENOUS; SUBCUTANEOUS at 11:11

## 2023-02-10 RX ADMIN — OXYCODONE HYDROCHLORIDE 10 MG: 10 TABLET ORAL at 14:38

## 2023-02-10 RX ADMIN — POLYETHYLENE GLYCOL 3350 17 G: 17 POWDER, FOR SOLUTION ORAL at 09:15

## 2023-02-10 NOTE — QUICK NOTE
Patient feeling more uncomfortable after eating regular diet for lunch  Recheck later today with continued distention, not as severe as on admission but patient notes more than the last day with right lower quadrant tenderness  Denies nausea or vomiting  No further flatus or bowel movement since early this morning  Will take it easy and just have clear liquids tonight  Abdominal x-ray ordered to evaluate for further colonic distention    Continue to monitor abdominal exam    York Hawks

## 2023-02-10 NOTE — PROGRESS NOTES
Progress Note - Shanti Lindsey 50 y o  male MRN: [id number]    Unit/Bed#: -01 Encounter: [id number]        Subjective:     Patient reports his upper abdominal pain is resolved  He has RLQ discomfort  He did a have a small bowel movement last night, and the decompression tube migrated out  He is walking around the hallway  He is eager to advance diet  ROS: As noted in the HPI, otherwise all others negative  Objective:     Vitals: Blood pressure 141/96, pulse 86, temperature 98 2 °F (36 8 °C), resp  rate 18, SpO2 96 %  ,There is no height or weight on file to calculate BMI  Intake/Output Summary (Last 24 hours) at 2/10/2023 0908  Last data filed at 2/10/2023 0654  Gross per 24 hour   Intake 2260 ml   Output 600 ml   Net 1660 ml       Physical Exam:     General Appearance: Alert and oriented x 3  In no respiratory distress  Lungs: Clear to auscultation bilaterally, no rales or rhonchi  Heart: Regular rate and rhythm, S1, S2 normal, no murmur, click, rub or gallop  Abdomen: Soft, non-tender, non-distended; bowel sounds normal; no masses or no organomegaly  Extremities: No cyanosis, edema    Invasive Devices     Peripheral Intravenous Line  Duration           Peripheral IV 02/06/23 Left Forearm 3 days    Peripheral IV 02/07/23 Right;Ventral (anterior) Forearm 3 days                Lab Results:  Results from last 7 days   Lab Units 02/10/23  0336   WBC Thousand/uL 5 22   HEMOGLOBIN g/dL 12 5   HEMATOCRIT % 37 8   PLATELETS Thousands/uL 118*   NEUTROS PCT % 47   LYMPHS PCT % 34   MONOS PCT % 14*   EOS PCT % 3     Results from last 7 days   Lab Units 02/10/23  0336 02/08/23  0508 02/07/23  0420   POTASSIUM mmol/L 4 1   < > 3 7   CHLORIDE mmol/L 100   < > 99   CO2 mmol/L 27   < > 28   BUN mg/dL 5   < > 13   CREATININE mg/dL 0 52*   < > 0 50*   CALCIUM mg/dL 9 2   < > 8 1*   ALK PHOS U/L  --   --  148*   ALT U/L  --   --  73   AST U/L  --   --  66*    < > = values in this interval not displayed  Results from last 7 days   Lab Units 02/06/23  2113   LIPASE u/L 68*       Imaging Studies: I have personally reviewed pertinent imaging studies  Colonoscopy    Result Date: 2/8/2023  Impression: 1  Small bowel and colonic ileus, status post decompression and placement of a colonic decompression tube RECOMMENDATION: 1  Do not place this decompression tube on intermittent suction 2  Manual suction will be performed in the event that the patient is redeveloping colonic ileus 3  Decompression tube has been taped to the leg  Mary Beth Rai, DO Lazaro Jeffries, FACP    XR abdomen 1 view kub    Result Date: 2/9/2023  Impression: Status post insertion of the colonic decompression tube, with tip in the transverse colon  Diffuse large bowel distention again seen consistent with ileus, improved from priors  Workstation performed: [id number]     XR abdomen 1 view kub    Result Date: 2/8/2023  Impression: Dilatation of the ascending and transverse colon with a maximum diameter of the ascending colon of up to 8 cm  Close follow-up recommended  The study was marked in EPIC for significant notification  Workstation performed: [id number]     XR abdomen 1 view kub    Result Date: 2/8/2023  Impression: Nonobstructive bowel gas pattern with gas-filled the small bowel loops and colonic loops compatible with ileus Gas-filled cecum noted with the diameter measuring 9 5 cm Workstation performed: [id number]     FL < 1 hour    Result Date: 2/9/2023  Impression: Fluoroscopic guidance provided for procedure guidance  Please refer to the separate procedure notes for additional details  Workstation performed: [id number]     CT abdomen pelvis with contrast    Result Date: 2/6/2023  Impression: Liquid stool within the colon with marked distention of the colon  Ascending colon measuring up to 7 cm  Findings may represent colitis  Given the size of the colon patient is at a high risk for perforation   Pneumoperitoneum which may be due to prior surgery however cannot rule out postsurgical complication or other etiologies  Cirrhotic liver with signs of portal hypertension  Thickening of the urinary bladder wall with surrounding inflammatory changes likely representing cystitis  Follow-up with urology is recommended  I personally discussed this study with Ernst Fatimaer on 2/6/2023 at 10:46 PM  Workstation performed: [id number]         Assessment and Plan:     1) Colonic ileus - Began post-op inguinal hernia repair  Cecum measured 9 5 cm on KUB performed on 2/8  Remaining colon around 7-8 cm  Patient underwent colonoscopy with decompression on 2/8 with insertion of a decompression tube in the right colon in case patient would need further decompression  The tube fell out yesterday during a bowel movement per patient  His abdomen is soft, and he is ambulating around the hallway  Generalized abdominal pain much improved, and he is more so complaining of RLQ discomfort post inguinal hernia repair   - Encouraged patient to be up and out of bed to promote motility   - Advance to full liquid diet  - Continue to monitor and replete potassium, calcium, and magnesium as necessary   - Start bowel regimen   - Updated surgery servive     2) Cirrhosis on imaging - Noted on MRI last year and again on CT on admission  April Ibarra had no signs of portal hypertension on his endoscopic ultrasound 1 year ago  Deejay Bazzi was referred to hepatology, does not appear that he had an appointment  Deejay Bazzi does have history of fatty liver disease from hypertriglyceridemia  Patient aware  - Once above improves, we can consider repeat MRI to investigate   - If consistent with cirrhosis, would need an eventual EGD screening for varices   - Outpatient follow-up        GI will sign off, please call with questions  Thank you!

## 2023-02-10 NOTE — PROGRESS NOTES
Progress Note -Surgery BROOKLYNN Arguellester 50 y o  male MRN: [id number]  Unit/Bed#: -01 Encounter: [id number]      Assessment   51y M with colonic distension 2/2 colonic ileus s/p colonic decompression with colonic decompression tube placement 2/8/2023  - passing flatus and +BM, decompression tube came out with BM  -Afebrile, vital signs stable, no leukocytosis  Hyponatremia -resolved    Plan   --Advance diet as tolerated  -- Continue with serial labs and abdominal exams  -- Encourage ambulation  -- DVT prophylaxis  -- Tentative discharge later today if continues to do well  ______________________________________________________________________  Subjective:   Patient feeling well this morning  Still has some low right sided abdominal pain that he states is mild  He is tolerating clear liquid diet  Denies any nausea or vomiting  He is passing flatus  He had a bowel movement which removed the colonic decompression tube  Objective:    Vitals:  /96   Pulse 86   Temp 98 2 °F (36 8 °C)   Resp 18   SpO2 96%     I/Os:  I/O last 3 completed shifts: In: 4612 1 [P O :1260; I V :3352 1]  Out: 2750 [Urine:2750]    No intake/output data recorded      Invasive Devices     Peripheral Intravenous Line  Duration           Peripheral IV 02/06/23 Left Forearm 3 days    Peripheral IV 02/07/23 Right;Ventral (anterior) Forearm 3 days                Medications:  Current Facility-Administered Medications   Medication Dose Route Frequency   • acetaminophen (TYLENOL) tablet 650 mg  650 mg Oral Q6H PRN   • Famotidine (PF) (PEPCID) injection 20 mg  20 mg Intravenous Q12H [facility]   • heparin (porcine) subcutaneous injection 5,000 Units  5,000 Units Subcutaneous Q8H [facility]   • HYDROmorphone (DILAUDID) injection 0 5 mg  0 5 mg Intravenous Q4H PRN   • ondansetron (ZOFRAN) injection 4 mg  4 mg Intravenous Q6H PRN   • oxyCODONE (ROXICODONE) immediate release tablet 10 mg  10 mg Oral Q4H PRN   • oxyCODONE (ROXICODONE) IR tablet 5 mg 5 mg Oral Q4H PRN   • polyethylene glycol (MIRALAX) packet 17 g  17 g Oral Daily                 Lab Results and Cultures:   CBC with diff:   Lab Results   Component Value Date    WBC 5 22 02/10/2023    HGB 12 5 02/10/2023    HCT 37 8 02/10/2023     (H) 02/10/2023     (L) 02/10/2023    MCH 33 7 02/10/2023    MCHC 33 1 02/10/2023    RDW 12 4 02/10/2023    MPV 10 0 02/10/2023    NRBC 0 02/10/2023       BMP/CMP:  Lab Results   Component Value Date     04/26/2017    K 4 1 02/10/2023    K 4 2 01/23/2023     02/10/2023    CL 98 01/23/2023    CO2 27 02/10/2023    CO2 29 01/23/2023    BUN 5 02/10/2023    BUN 14 01/23/2023    CREATININE 0 52 (L) 02/10/2023    CREATININE 0 80 04/26/2017    CALCIUM 9 2 02/10/2023    CALCIUM 9 5 01/23/2023    AST 66 (H) 02/07/2023    AST 59 (H) 01/23/2023    ALT 73 02/07/2023    ALT 48 (H) 01/23/2023    ALKPHOS 148 (H) 02/07/2023    ALKPHOS 138 (H) 01/23/2023    PROT 7 3 04/26/2017    BILITOT 1 8 (H) 04/26/2017    EGFR 125 02/10/2023       Lipid Panel:   Lab Results   Component Value Date    CHOL 114 (L) 04/26/2017       Coags:   Lab Results   Component Value Date    PTT 36 05/14/2020    INR 1 1 01/23/2023    INR 1 27 (H) 05/14/2020        Urinalysis:   Lab Results   Component Value Date    COLORU Yellow 02/06/2023    COLORU YELLOW 09/15/2016    CLARITYU Clear 02/06/2023    SPECGRAV >=1 050 (H) 02/06/2023    SPECGRAV 1 007 09/15/2016    PHUR 7 0 02/06/2023    PHUR 6 5 09/11/2018    PHUR 7 0 09/15/2016    LEUKOCYTESUR Negative 02/06/2023    LEUKOCYTESUR NEGATIVE 09/15/2016    NITRITE Negative 02/06/2023    NITRITE NEGATIVE 09/15/2016    PROTEINUA NEGATIVE 09/15/2016    GLUCOSEU 1000 (1%) (A) 02/06/2023    KETONESU Trace (A) 02/06/2023    KETONESU NEGATIVE 09/15/2016    BILIRUBINUR Negative 02/06/2023    BILIRUBINUR NEGATIVE 09/15/2016    BLOODU Negative 02/06/2023        Urine Culture: No results found for: URINECX   Wound Culure: No results found for: WOUNDCULT  Blood Culture:   Lab Results   Component Value Date    BLOODCX No Growth After 5 Days  08/20/2019    BLOODCX No Growth After 5 Days  08/20/2019         Physical Exam:  General Appearance:    Alert and orientated x 3, cooperative, no distress, appears stated age   Lungs:     Clear to auscultation bilaterally, respirations unlabored, no wheezes    Heart:    Regular rate and rhythm, S1 and S2 normal, no murmur   Abdomen:    Normoactive BS, soft, mild right lower quadrant tenderness, non rigid, no masses, no palpated organomegaly  Wound/Dressing:  C/d/i, healing well   Extremities:  Extremities normal, no calf tenderness, no cyanosis or edema   Pulses:   2+ and symmetric all extremities   Skin:   Skin color, texture, turgor normal, no rashes   Neurologic:   CNII-XII intact, normal strength, affect appropriate       Imaging:  Colonoscopy    Result Date: 2/8/2023  Impression: 1  Small bowel and colonic ileus, status post decompression and placement of a colonic decompression tube RECOMMENDATION: 1  Do not place this decompression tube on intermittent suction 2  Manual suction will be performed in the event that the patient is redeveloping colonic ileus 3  Decompression tube has been taped to the leg  José Luis Child, DO [facility] Johann Jeffries, FACP    XR abdomen 1 view kub    Result Date: 2/9/2023  Impression: Status post insertion of the colonic decompression tube, with tip in the transverse colon  Diffuse large bowel distention again seen consistent with ileus, improved from priors  Workstation performed: [id number]     XR abdomen 1 view kub    Result Date: 2/8/2023  Impression: Dilatation of the ascending and transverse colon with a maximum diameter of the ascending colon of up to 8 cm  Close follow-up recommended  The study was marked in EPIC for significant notification   Workstation performed: [id number]     XR abdomen 1 view kub    Result Date: 2/8/2023  Impression: Nonobstructive bowel gas pattern with gas-filled the small bowel loops and colonic loops compatible with ileus Gas-filled cecum noted with the diameter measuring 9 5 cm Workstation performed: [id number]     FL < 1 hour    Result Date: 2/9/2023  Impression: Fluoroscopic guidance provided for procedure guidance  Please refer to the separate procedure notes for additional details  Workstation performed: [id number]     CT abdomen pelvis with contrast    Result Date: 2/6/2023  Impression: Liquid stool within the colon with marked distention of the colon  Ascending colon measuring up to 7 cm  Findings may represent colitis  Given the size of the colon patient is at a high risk for perforation  Pneumoperitoneum which may be due to prior surgery however cannot rule out postsurgical complication or other etiologies  Cirrhotic liver with signs of portal hypertension  Thickening of the urinary bladder wall with surrounding inflammatory changes likely representing cystitis  Follow-up with urology is recommended    I personally discussed this study with Jessica Everett on 2/6/2023 at 10:46 PM  Workstation performed: [id number]       VTE Pharmacologic Prophylaxis: Heparin  VTE Mechanical Prophylaxis: sequential compression device    Sean Paiz PA-C   2/10/2023

## 2023-02-10 NOTE — TELEPHONE ENCOUNTER
Spoke to patient  Jasminejason Hammerfatoumata He will phone back and schedule an appt as, he is currently hospitalized  Roanna Schlatter

## 2023-02-10 NOTE — PLAN OF CARE
Problem: PAIN - ADULT  Goal: Verbalizes/displays adequate comfort level or baseline comfort level  Description: Interventions:  - Encourage patient to monitor pain and request assistance  - Assess pain using appropriate pain scale  - Administer analgesics based on type and severity of pain and evaluate response  - Implement non-pharmacological measures as appropriate and evaluate response  - Consider cultural and social influences on pain and pain management  - Notify physician/advanced practitioner if interventions unsuccessful or patient reports new pain  Outcome: Progressing     Problem: INFECTION - ADULT  Goal: Absence of fever/infection during neutropenic period  Description: INTERVENTIONS:  - Monitor WBC    Outcome: Progressing     Problem: INFECTION - ADULT  Goal: Absence or prevention of progression during hospitalization  Description: INTERVENTIONS:  - Assess and monitor for signs and symptoms of infection  - Monitor lab/diagnostic results  - Monitor all insertion sites, i e  indwelling lines, tubes, and drains  - Monitor endotracheal if appropriate and nasal secretions for changes in amount and color  - [place] appropriate cooling/warming therapies per order  - Administer medications as ordered  - Instruct and encourage patient and family to use good hand hygiene technique  - Identify and instruct in appropriate isolation precautions for identified infection/condition  Outcome: Progressing

## 2023-02-11 LAB
ANION GAP SERPL CALCULATED.3IONS-SCNC: 8 MMOL/L (ref 4–13)
BASOPHILS # BLD AUTO: 0.04 THOUSANDS/ÂΜL (ref 0–0.1)
BASOPHILS NFR BLD AUTO: 1 % (ref 0–1)
BUN SERPL-MCNC: 4 MG/DL (ref 5–25)
CALCIUM SERPL-MCNC: 9.4 MG/DL (ref 8.3–10.1)
CHLORIDE SERPL-SCNC: 95 MMOL/L (ref 96–108)
CO2 SERPL-SCNC: 29 MMOL/L (ref 21–32)
CREAT SERPL-MCNC: 0.69 MG/DL (ref 0.6–1.3)
EOSINOPHIL # BLD AUTO: 0.18 THOUSAND/ÂΜL (ref 0–0.61)
EOSINOPHIL NFR BLD AUTO: 4 % (ref 0–6)
ERYTHROCYTE [DISTWIDTH] IN BLOOD BY AUTOMATED COUNT: 12.3 % (ref 11.6–15.1)
GFR SERPL CREATININE-BSD FRML MDRD: 111 ML/MIN/1.73SQ M
GLUCOSE SERPL-MCNC: 193 MG/DL (ref 65–140)
HCT VFR BLD AUTO: 40.3 % (ref 36.5–49.3)
HGB BLD-MCNC: 13.5 G/DL (ref 12–17)
IMM GRANULOCYTES # BLD AUTO: 0.03 THOUSAND/UL (ref 0–0.2)
IMM GRANULOCYTES NFR BLD AUTO: 1 % (ref 0–2)
LYMPHOCYTES # BLD AUTO: 1.77 THOUSANDS/ÂΜL (ref 0.6–4.47)
LYMPHOCYTES NFR BLD AUTO: 34 % (ref 14–44)
MCH RBC QN AUTO: 34 PG (ref 26.8–34.3)
MCHC RBC AUTO-ENTMCNC: 33.5 G/DL (ref 31.4–37.4)
MCV RBC AUTO: 102 FL (ref 82–98)
MONOCYTES # BLD AUTO: 0.88 THOUSAND/ÂΜL (ref 0.17–1.22)
MONOCYTES NFR BLD AUTO: 17 % (ref 4–12)
NEUTROPHILS # BLD AUTO: 2.27 THOUSANDS/ÂΜL (ref 1.85–7.62)
NEUTS SEG NFR BLD AUTO: 43 % (ref 43–75)
NRBC BLD AUTO-RTO: 0 /100 WBCS
PLATELET # BLD AUTO: 125 THOUSANDS/UL (ref 149–390)
PMV BLD AUTO: 10.4 FL (ref 8.9–12.7)
POTASSIUM SERPL-SCNC: 3.4 MMOL/L (ref 3.5–5.3)
RBC # BLD AUTO: 3.97 MILLION/UL (ref 3.88–5.62)
SODIUM SERPL-SCNC: 132 MMOL/L (ref 135–147)
WBC # BLD AUTO: 5.17 THOUSAND/UL (ref 4.31–10.16)

## 2023-02-11 RX ORDER — GLYCOPYRROLATE 0.2 MG/ML
0.2 INJECTION INTRAMUSCULAR; INTRAVENOUS ONCE
Status: COMPLETED | OUTPATIENT
Start: 2023-02-11 | End: 2023-02-11

## 2023-02-11 RX ORDER — NEOSTIGMINE METHYLSULFATE 1 MG/ML
0.5 INJECTION INTRAVENOUS EVERY 6 HOURS
Status: DISCONTINUED | OUTPATIENT
Start: 2023-02-11 | End: 2023-02-12

## 2023-02-11 RX ADMIN — HYDROMORPHONE HYDROCHLORIDE 0.5 MG: 1 INJECTION, SOLUTION INTRAMUSCULAR; INTRAVENOUS; SUBCUTANEOUS at 09:07

## 2023-02-11 RX ADMIN — POLYETHYLENE GLYCOL 3350 17 G: 17 POWDER, FOR SOLUTION ORAL at 08:53

## 2023-02-11 RX ADMIN — HYDROMORPHONE HYDROCHLORIDE 0.5 MG: 1 INJECTION, SOLUTION INTRAMUSCULAR; INTRAVENOUS; SUBCUTANEOUS at 22:30

## 2023-02-11 RX ADMIN — OXYCODONE HYDROCHLORIDE 10 MG: 10 TABLET ORAL at 21:07

## 2023-02-11 RX ADMIN — GLYCOPYRROLATE 0.2 MG: 0.2 INJECTION INTRAMUSCULAR; INTRAVENOUS at 15:21

## 2023-02-11 RX ADMIN — FAMOTIDINE 20 MG: 10 INJECTION, SOLUTION INTRAVENOUS at 08:53

## 2023-02-11 RX ADMIN — NEOSTIGMINE METHYLSULFATE 0.5 MG: 1 INJECTION INTRAVENOUS at 15:28

## 2023-02-11 RX ADMIN — NEOSTIGMINE METHYLSULFATE 0.5 MG: 1 INJECTION INTRAVENOUS at 22:31

## 2023-02-11 RX ADMIN — OXYCODONE HYDROCHLORIDE 10 MG: 10 TABLET ORAL at 06:25

## 2023-02-11 RX ADMIN — HEPARIN SODIUM 5000 UNITS: 5000 INJECTION INTRAVENOUS; SUBCUTANEOUS at 06:22

## 2023-02-11 RX ADMIN — OXYCODONE HYDROCHLORIDE 10 MG: 10 TABLET ORAL at 13:27

## 2023-02-11 RX ADMIN — OXYCODONE HYDROCHLORIDE 10 MG: 10 TABLET ORAL at 01:49

## 2023-02-11 RX ADMIN — HYDROMORPHONE HYDROCHLORIDE 0.5 MG: 1 INJECTION, SOLUTION INTRAMUSCULAR; INTRAVENOUS; SUBCUTANEOUS at 02:59

## 2023-02-11 RX ADMIN — HEPARIN SODIUM 5000 UNITS: 5000 INJECTION INTRAVENOUS; SUBCUTANEOUS at 21:09

## 2023-02-11 RX ADMIN — FAMOTIDINE 20 MG: 10 INJECTION, SOLUTION INTRAVENOUS at 21:09

## 2023-02-11 NOTE — PLAN OF CARE
Problem: PAIN - ADULT  Goal: Verbalizes/displays adequate comfort level or baseline comfort level  Description: Interventions:  - Encourage patient to monitor pain and request assistance  - Assess pain using appropriate pain scale  - Administer analgesics based on type and severity of pain and evaluate response  - Implement non-pharmacological measures as appropriate and evaluate response  - Consider cultural and social influences on pain and pain management  - Notify physician/advanced practitioner if interventions unsuccessful or patient reports new pain  Outcome: Progressing     Problem: INFECTION - ADULT  Goal: Absence or prevention of progression during hospitalization  Description: INTERVENTIONS:  - Assess and monitor for signs and symptoms of infection  - Monitor lab/diagnostic results  - Monitor all insertion sites, i e  indwelling lines, tubes, and drains  - Monitor endotracheal if appropriate and nasal secretions for changes in amount and color  - [place] appropriate cooling/warming therapies per order  - Administer medications as ordered  - Instruct and encourage patient and family to use good hand hygiene technique  - Identify and instruct in appropriate isolation precautions for identified infection/condition  Outcome: Progressing  Goal: Absence of fever/infection during neutropenic period  Description: INTERVENTIONS:  - Monitor WBC    Outcome: Progressing     Problem: SAFETY ADULT  Goal: Patient will remain free of falls  Description: INTERVENTIONS:  - Educate patient/family on patient safety including physical limitations  - Instruct patient to call for assistance with activity   - Consult OT/PT to assist with strengthening/mobility   - Keep Call bell within reach  - Keep bed low and locked with side rails adjusted as appropriate  - Keep care items and personal belongings within reach  - Initiate and maintain comfort rounds  - Make Fall Risk Sign visible to staff  - Offer Toileting every 2 Hours, in advance of need  - Initiate/Maintain bed alarm  - Obtain necessary fall risk management equipment: chair alrm  - Apply yellow socks and bracelet for high fall risk patients  - Consider moving patient to room near nurses station  Outcome: Progressing  Goal: Maintain or return to baseline ADL function  Description: INTERVENTIONS:  -  Assess patient's ability to carry out ADLs; assess patient's baseline for ADL function and identify physical deficits which impact ability to perform ADLs (bathing, care of mouth/teeth, toileting, grooming, dressing, etc )  - Assess/evaluate cause of self-care deficits   - Assess range of motion  - Assess patient's mobility; develop plan if impaired  - Assess patient's need for assistive devices and provide as appropriate  - Encourage maximum independence but intervene and supervise when necessary  - Involve family in performance of ADLs  - Assess for home care needs following discharge   - Consider OT consult to assist with ADL evaluation and planning for discharge  - Provide patient education as appropriate  Outcome: Progressing  Goal: Maintains/Returns to pre admission functional level  Description: INTERVENTIONS:  - Perform BMAT or MOVE assessment daily    - Set and communicate daily mobility goal to care team and patient/family/caregiver  - Collaborate with rehabilitation services on mobility goals if consulted  - Perform Range of Motion 3 times a day  - Reposition patient every 3 hours    - Dangle patient 3 times a day  - Stand patient 3 times a day  - Ambulate patient 3 times a day  - Out of bed to chair 3 times a day   - Out of bed for meals 3 times a day  - Out of bed for toileting  - Record patient progress and toleration of activity level   Outcome: Progressing     Problem: DISCHARGE PLANNING  Goal: Discharge to home or other facility with appropriate resources  Description: INTERVENTIONS:  - Identify barriers to discharge w/patient and caregiver  - Arrange for needed discharge resources and transportation as appropriate  - Identify discharge learning needs (meds, wound care, etc )  - Arrange for interpretive services to assist at discharge as needed  - Refer to Case Management Department for coordinating discharge planning if the patient needs post-hospital services based on physician/advanced practitioner order or complex needs related to functional status, cognitive ability, or social support system  Outcome: Progressing     Problem: Knowledge Deficit  Goal: Patient/family/caregiver demonstrates understanding of disease process, treatment plan, medications, and discharge instructions  Description: Complete learning assessment and assess knowledge base    Interventions:  - Provide teaching at level of understanding  - Provide teaching via preferred learning methods  Outcome: Progressing     Problem: GASTROINTESTINAL - ADULT  Goal: Minimal or absence of nausea and/or vomiting  Description: INTERVENTIONS:  - Administer IV fluids if ordered to ensure adequate hydration  - Maintain NPO status until nausea and vomiting are resolved  - Nasogastric tube if ordered  - Administer ordered antiemetic medications as needed  - Provide nonpharmacologic comfort measures as appropriate  - Advance diet as tolerated, if ordered  - Consider nutrition services referral to assist patient with adequate nutrition and appropriate food choices  Outcome: Progressing  Goal: Maintains or returns to baseline bowel function  Description: INTERVENTIONS:  - Assess bowel function  - Encourage oral fluids to ensure adequate hydration  - Administer IV fluids if ordered to ensure adequate hydration  - Administer ordered medications as needed  - Encourage mobilization and activity  - Consider nutritional services referral to assist patient with adequate nutrition and appropriate food choices  Outcome: Progressing  Goal: Maintains adequate nutritional intake  Description: INTERVENTIONS:  - Monitor percentage of each meal consumed  - Identify factors contributing to decreased intake, treat as appropriate  - Assist with meals as needed  - Monitor I&O, weight, and lab values if indicated  - Obtain nutrition services referral as needed  Outcome: Progressing  Goal: Oral mucous membranes remain intact  Description: INTERVENTIONS  - Assess oral mucosa and hygiene practices  - Implement preventative oral hygiene regimen  - Implement oral medicated treatments as ordered  - Initiate Nutrition services referral as needed  Outcome: Progressing

## 2023-02-11 NOTE — NURSING NOTE
Contacted surgery on call provider at 2015 regarding patient c/o tenderness in abdomen, distention and episode of vomiting at approximately 1945  Instructed by Dr Oral Foote to insert NG; spoke with patient about it he stated he would feel more comfortable speaking with Dr Jill Henderson about his concerns before getting an NG placed  Provider notified of patients wishes, stated to retake VS and get xray to attempt KUB for second time as first time was unsuccessful d/t patient being in too much pain; while speaking with provider patient informed myself that he had large loose BM with "a lot of gas" and felt a lot better

## 2023-02-11 NOTE — PLAN OF CARE
Problem: PAIN - ADULT  Goal: Verbalizes/displays adequate comfort level or baseline comfort level  Description: Interventions:  - Encourage patient to monitor pain and request assistance  - Assess pain using appropriate pain scale  - Administer analgesics based on type and severity of pain and evaluate response  - Implement non-pharmacological measures as appropriate and evaluate response  - Consider cultural and social influences on pain and pain management  - Notify physician/advanced practitioner if interventions unsuccessful or patient reports new pain  Outcome: Progressing     Problem: GASTROINTESTINAL - ADULT  Goal: Maintains or returns to baseline bowel function  Description: INTERVENTIONS:  - Assess bowel function  - Encourage oral fluids to ensure adequate hydration  - Administer IV fluids if ordered to ensure adequate hydration  - Administer ordered medications as needed  - Encourage mobilization and activity  - Consider nutritional services referral to assist patient with adequate nutrition and appropriate food choices  Outcome: Progressing     Problem: GASTROINTESTINAL - ADULT  Goal: Minimal or absence of nausea and/or vomiting  Description: INTERVENTIONS:  - Administer IV fluids if ordered to ensure adequate hydration  - Maintain NPO status until nausea and vomiting are resolved  - Nasogastric tube if ordered  - Administer ordered antiemetic medications as needed  - Provide nonpharmacologic comfort measures as appropriate  - Advance diet as tolerated, if ordered  - Consider nutrition services referral to assist patient with adequate nutrition and appropriate food choices  Outcome: Progressing

## 2023-02-11 NOTE — PROGRESS NOTES
Progress Note - General Surgery   Neville Moya 50 y o  male MRN: [id number]  Unit/Bed#: -01 Encounter: [id number]    Assessment/Plan  Neville Moya is a 50 y o  male     Colonic ileus with severe cecal dilation requiring colonoscopic decompression and placement of decompression tube  AVSS, Did pass flatus and have BM yesterday, none this AM so far  States he does feel improved after a BM yesterday but is starting to feel more discomfort this AM as he has not had a bowel movement yet  Abdomen remains rounded and distended, softening, tympanic on percussion, non-tender to palpation   Repeat KUB yesterday does show a slightly decreased colonic diameter on measurement     Given slow motility with persistent distention, will administer neostigmine  Monitor for bowel function afterwards  Serial abdominal exams  Diet as tolerated at this time   Trend labs, monitor vitals  Pain control/antiemetics PRN  DVT prophylaxis       Subjective/Objective    Subjective: Feeling better after having had a BM yesterday evening     Objective:     Blood pressure 125/71, pulse 81, temperature 98 2 °F (36 8 °C), resp  rate 14, SpO2 95 %  ,There is no height or weight on file to calculate BMI  No intake or output data in the 24 hours ending 02/11/23 1415    Invasive Devices     Peripheral Intravenous Line  Duration           Peripheral IV 02/11/23 Dorsal (posterior); Left Forearm <1 day                Physical Exam: /71   Pulse 81   Temp 98 2 °F (36 8 °C)   Resp 14   SpO2 95%   General appearance: alert and oriented, in no acute distress  Lungs: clear to auscultation bilaterally  Heart: regular rate and rhythm, S1, S2 normal, no murmur, click, rub or gallop  Abdomen: firm, distended but does appear to be softening, non-tender to palpation, hypoactive bowel sounds  Extremities: extremities normal, warm and well-perfused; no cyanosis, clubbing, or edema    Lab, Imaging and other studies:I have personally reviewed pertinent lab results       VTE Pharmacologic Prophylaxis: Heparin  VTE Mechanical Prophylaxis: sequential compression device    Recent Results (from the past 36 hour(s))   Basic metabolic panel    Collection Time: 02/10/23  3:36 AM   Result Value Ref Range    Sodium 136 135 - 147 mmol/L    Potassium 4 1 3 5 - 5 3 mmol/L    Chloride 100 96 - 108 mmol/L    CO2 27 21 - 32 mmol/L    ANION GAP 9 4 - 13 mmol/L    BUN 5 5 - 25 mg/dL    Creatinine 0 52 (L) 0 60 - 1 30 mg/dL    Glucose 105 65 - 140 mg/dL    Calcium 9 2 8 3 - 10 1 mg/dL    eGFR 125 ml/min/1 73sq m   CBC and differential    Collection Time: 02/10/23  3:36 AM   Result Value Ref Range    WBC 5 22 4 31 - 10 16 Thousand/uL    RBC 3 71 (L) 3 88 - 5 62 Million/uL    Hemoglobin 12 5 12 0 - 17 0 g/dL    Hematocrit 37 8 36 5 - 49 3 %     (H) 82 - 98 fL    MCH 33 7 26 8 - 34 3 pg    MCHC 33 1 31 4 - 37 4 g/dL    RDW 12 4 11 6 - 15 1 %    MPV 10 0 8 9 - 12 7 fL    Platelets 544 (L) 890 - 390 Thousands/uL    nRBC 0 /100 WBCs    Neutrophils Relative 47 43 - 75 %    Immat GRANS % 1 0 - 2 %    Lymphocytes Relative 34 14 - 44 %    Monocytes Relative 14 (H) 4 - 12 %    Eosinophils Relative 3 0 - 6 %    Basophils Relative 1 0 - 1 %    Neutrophils Absolute 2 44 1 85 - 7 62 Thousands/µL    Immature Grans Absolute 0 04 0 00 - 0 20 Thousand/uL    Lymphocytes Absolute 1 78 0 60 - 4 47 Thousands/µL    Monocytes Absolute 0 75 0 17 - 1 22 Thousand/µL    Eosinophils Absolute 0 18 0 00 - 0 61 Thousand/µL    Basophils Absolute 0 03 0 00 - 0 10 Thousands/µL   Fingerstick Glucose (POCT)    Collection Time: 02/10/23  8:10 PM   Result Value Ref Range    POC Glucose 107 65 - 140 mg/dl   CBC and differential    Collection Time: 02/11/23  6:18 AM   Result Value Ref Range    WBC 5 17 4 31 - 10 16 Thousand/uL    RBC 3 97 3 88 - 5 62 Million/uL    Hemoglobin 13 5 12 0 - 17 0 g/dL    Hematocrit 40 3 36 5 - 49 3 %     (H) 82 - 98 fL    MCH 34 0 26 8 - 34 3 pg    MCHC 33 5 31 4 - 37 4 g/dL    RDW 12 3 11 6 - 15 1 %    MPV 10 4 8 9 - 12 7 fL    Platelets 784 (L) 525 - 390 Thousands/uL    nRBC 0 /100 WBCs    Neutrophils Relative 43 43 - 75 %    Immat GRANS % 1 0 - 2 %    Lymphocytes Relative 34 14 - 44 %    Monocytes Relative 17 (H) 4 - 12 %    Eosinophils Relative 4 0 - 6 %    Basophils Relative 1 0 - 1 %    Neutrophils Absolute 2 27 1 85 - 7 62 Thousands/µL    Immature Grans Absolute 0 03 0 00 - 0 20 Thousand/uL    Lymphocytes Absolute 1 77 0 60 - 4 47 Thousands/µL    Monocytes Absolute 0 88 0 17 - 1 22 Thousand/µL    Eosinophils Absolute 0 18 0 00 - 0 61 Thousand/µL    Basophils Absolute 0 04 0 00 - 0 10 Thousands/µL   Basic metabolic panel    Collection Time: 02/11/23  9:06 AM   Result Value Ref Range    Sodium 132 (L) 135 - 147 mmol/L    Potassium 3 4 (L) 3 5 - 5 3 mmol/L    Chloride 95 (L) 96 - 108 mmol/L    CO2 29 21 - 32 mmol/L    ANION GAP 8 4 - 13 mmol/L    BUN 4 (L) 5 - 25 mg/dL    Creatinine 0 69 0 60 - 1 30 mg/dL    Glucose 193 (H) 65 - 140 mg/dL    Calcium 9 4 8 3 - 10 1 mg/dL    eGFR 111 ml/min/1 73sq m

## 2023-02-11 NOTE — PROGRESS NOTES
Patient given neostigmine and monitored closely for  Hour in ICU bed   No reactions noted, patient transferred back to room 425, report given to Ed RN, no further questions

## 2023-02-12 ENCOUNTER — APPOINTMENT (INPATIENT)
Dept: CT IMAGING | Facility: HOSPITAL | Age: 48
End: 2023-02-12

## 2023-02-12 LAB
ANION GAP SERPL CALCULATED.3IONS-SCNC: 10 MMOL/L (ref 4–13)
BASOPHILS # BLD AUTO: 0.06 THOUSANDS/ÂΜL (ref 0–0.1)
BASOPHILS NFR BLD AUTO: 1 % (ref 0–1)
BUN SERPL-MCNC: 5 MG/DL (ref 5–25)
CALCIUM SERPL-MCNC: 10 MG/DL (ref 8.3–10.1)
CHLORIDE SERPL-SCNC: 99 MMOL/L (ref 96–108)
CO2 SERPL-SCNC: 26 MMOL/L (ref 21–32)
CREAT SERPL-MCNC: 0.56 MG/DL (ref 0.6–1.3)
EOSINOPHIL # BLD AUTO: 0.18 THOUSAND/ÂΜL (ref 0–0.61)
EOSINOPHIL NFR BLD AUTO: 3 % (ref 0–6)
ERYTHROCYTE [DISTWIDTH] IN BLOOD BY AUTOMATED COUNT: 12.1 % (ref 11.6–15.1)
GFR SERPL CREATININE-BSD FRML MDRD: 121 ML/MIN/1.73SQ M
GLUCOSE SERPL-MCNC: 106 MG/DL (ref 65–140)
HCT VFR BLD AUTO: 42.2 % (ref 36.5–49.3)
HGB BLD-MCNC: 13.9 G/DL (ref 12–17)
IMM GRANULOCYTES # BLD AUTO: 0.04 THOUSAND/UL (ref 0–0.2)
IMM GRANULOCYTES NFR BLD AUTO: 1 % (ref 0–2)
LYMPHOCYTES # BLD AUTO: 1.99 THOUSANDS/ÂΜL (ref 0.6–4.47)
LYMPHOCYTES NFR BLD AUTO: 34 % (ref 14–44)
MCH RBC QN AUTO: 33.6 PG (ref 26.8–34.3)
MCHC RBC AUTO-ENTMCNC: 32.9 G/DL (ref 31.4–37.4)
MCV RBC AUTO: 102 FL (ref 82–98)
MONOCYTES # BLD AUTO: 0.91 THOUSAND/ÂΜL (ref 0.17–1.22)
MONOCYTES NFR BLD AUTO: 15 % (ref 4–12)
NEUTROPHILS # BLD AUTO: 2.73 THOUSANDS/ÂΜL (ref 1.85–7.62)
NEUTS SEG NFR BLD AUTO: 46 % (ref 43–75)
NRBC BLD AUTO-RTO: 0 /100 WBCS
PLATELET # BLD AUTO: 150 THOUSANDS/UL (ref 149–390)
PMV BLD AUTO: 9.6 FL (ref 8.9–12.7)
POTASSIUM SERPL-SCNC: 4.3 MMOL/L (ref 3.5–5.3)
RBC # BLD AUTO: 4.14 MILLION/UL (ref 3.88–5.62)
SODIUM SERPL-SCNC: 135 MMOL/L (ref 135–147)
WBC # BLD AUTO: 5.91 THOUSAND/UL (ref 4.31–10.16)

## 2023-02-12 RX ORDER — DEXTROSE, SODIUM CHLORIDE, AND POTASSIUM CHLORIDE 5; .45; .15 G/100ML; G/100ML; G/100ML
50 INJECTION INTRAVENOUS CONTINUOUS
Status: DISCONTINUED | OUTPATIENT
Start: 2023-02-12 | End: 2023-02-13

## 2023-02-12 RX ORDER — DEXTROSE, SODIUM CHLORIDE, AND POTASSIUM CHLORIDE 5; .45; .15 G/100ML; G/100ML; G/100ML
50 INJECTION INTRAVENOUS CONTINUOUS
Status: DISCONTINUED | OUTPATIENT
Start: 2023-02-12 | End: 2023-02-12

## 2023-02-12 RX ADMIN — FAMOTIDINE 20 MG: 10 INJECTION, SOLUTION INTRAVENOUS at 10:16

## 2023-02-12 RX ADMIN — OXYCODONE HYDROCHLORIDE 10 MG: 10 TABLET ORAL at 10:16

## 2023-02-12 RX ADMIN — OXYCODONE HYDROCHLORIDE 10 MG: 10 TABLET ORAL at 15:55

## 2023-02-12 RX ADMIN — HYDROMORPHONE HYDROCHLORIDE 0.5 MG: 1 INJECTION, SOLUTION INTRAMUSCULAR; INTRAVENOUS; SUBCUTANEOUS at 18:24

## 2023-02-12 RX ADMIN — POLYETHYLENE GLYCOL 3350 17 G: 17 POWDER, FOR SOLUTION ORAL at 10:16

## 2023-02-12 RX ADMIN — HEPARIN SODIUM 5000 UNITS: 5000 INJECTION INTRAVENOUS; SUBCUTANEOUS at 05:02

## 2023-02-12 RX ADMIN — HYDROMORPHONE HYDROCHLORIDE 0.5 MG: 1 INJECTION, SOLUTION INTRAMUSCULAR; INTRAVENOUS; SUBCUTANEOUS at 12:30

## 2023-02-12 RX ADMIN — HEPARIN SODIUM 5000 UNITS: 5000 INJECTION INTRAVENOUS; SUBCUTANEOUS at 21:36

## 2023-02-12 RX ADMIN — HYDROMORPHONE HYDROCHLORIDE 0.5 MG: 1 INJECTION, SOLUTION INTRAMUSCULAR; INTRAVENOUS; SUBCUTANEOUS at 06:14

## 2023-02-12 RX ADMIN — HYDROMORPHONE HYDROCHLORIDE 0.5 MG: 1 INJECTION, SOLUTION INTRAMUSCULAR; INTRAVENOUS; SUBCUTANEOUS at 23:54

## 2023-02-12 RX ADMIN — DEXTROSE, SODIUM CHLORIDE, AND POTASSIUM CHLORIDE 50 ML/HR: 5; .45; .15 INJECTION INTRAVENOUS at 15:52

## 2023-02-12 RX ADMIN — OXYCODONE HYDROCHLORIDE 10 MG: 10 TABLET ORAL at 05:02

## 2023-02-12 RX ADMIN — OXYCODONE HYDROCHLORIDE 10 MG: 10 TABLET ORAL at 21:35

## 2023-02-12 RX ADMIN — HEPARIN SODIUM 5000 UNITS: 5000 INJECTION INTRAVENOUS; SUBCUTANEOUS at 14:04

## 2023-02-12 RX ADMIN — IOHEXOL 50 ML: 240 INJECTION, SOLUTION INTRATHECAL; INTRAVASCULAR; INTRAVENOUS; ORAL at 13:48

## 2023-02-12 RX ADMIN — FAMOTIDINE 20 MG: 10 INJECTION, SOLUTION INTRAVENOUS at 21:36

## 2023-02-12 NOTE — PLAN OF CARE
Problem: PAIN - ADULT  Goal: Verbalizes/displays adequate comfort level or baseline comfort level  Description: Interventions:  - Encourage patient to monitor pain and request assistance  - Assess pain using appropriate pain scale  - Administer analgesics based on type and severity of pain and evaluate response  - Implement non-pharmacological measures as appropriate and evaluate response  - Consider cultural and social influences on pain and pain management  - Notify physician/advanced practitioner if interventions unsuccessful or patient reports new pain  Outcome: Progressing     Problem: INFECTION - ADULT  Goal: Absence or prevention of progression during hospitalization  Description: INTERVENTIONS:  - Assess and monitor for signs and symptoms of infection  - Monitor lab/diagnostic results  - Monitor all insertion sites, i e  indwelling lines, tubes, and drains  - Monitor endotracheal if appropriate and nasal secretions for changes in amount and color  - [place] appropriate cooling/warming therapies per order  - Administer medications as ordered  - Instruct and encourage patient and family to use good hand hygiene technique  - Identify and instruct in appropriate isolation precautions for identified infection/condition  Outcome: Progressing  Goal: Absence of fever/infection during neutropenic period  Description: INTERVENTIONS:  - Monitor WBC    Outcome: Progressing     Problem: SAFETY ADULT  Goal: Patient will remain free of falls  Description: INTERVENTIONS:  - Educate patient/family on patient safety including physical limitations  - Instruct patient to call for assistance with activity   - Consult OT/PT to assist with strengthening/mobility   - Keep Call bell within reach  - Keep bed low and locked with side rails adjusted as appropriate  - Keep care items and personal belongings within reach  - Initiate and maintain comfort rounds  - Make Fall Risk Sign visible to staff  - Offer Toileting every 2 Hours, in advance of need  - Initiate/Maintain bed alarm  - Obtain necessary fall risk management equipment: chair alarm  - Apply yellow socks and bracelet for high fall risk patients  - Consider moving patient to room near nurses station  Outcome: Progressing  Goal: Maintain or return to baseline ADL function  Description: INTERVENTIONS:  -  Assess patient's ability to carry out ADLs; assess patient's baseline for ADL function and identify physical deficits which impact ability to perform ADLs (bathing, care of mouth/teeth, toileting, grooming, dressing, etc )  - Assess/evaluate cause of self-care deficits   - Assess range of motion  - Assess patient's mobility; develop plan if impaired  - Assess patient's need for assistive devices and provide as appropriate  - Encourage maximum independence but intervene and supervise when necessary  - Involve family in performance of ADLs  - Assess for home care needs following discharge   - Consider OT consult to assist with ADL evaluation and planning for discharge  - Provide patient education as appropriate  Outcome: Progressing  Goal: Maintains/Returns to pre admission functional level  Description: INTERVENTIONS:  - Perform BMAT or MOVE assessment daily    - Set and communicate daily mobility goal to care team and patient/family/caregiver  - Collaborate with rehabilitation services on mobility goals if consulted  - Perform Range of Motion 3 times a day  - Reposition patient every 3 hours    - Dangle patient 3 times a day  - Stand patient 3 times a day  - Ambulate patient 3 times a day  - Out of bed to chair 3 times a day   - Out of bed for meals 3 times a day  - Out of bed for toileting  - Record patient progress and toleration of activity level   Outcome: Progressing     Problem: DISCHARGE PLANNING  Goal: Discharge to home or other facility with appropriate resources  Description: INTERVENTIONS:  - Identify barriers to discharge w/patient and caregiver  - Arrange for needed discharge resources and transportation as appropriate  - Identify discharge learning needs (meds, wound care, etc )  - Arrange for interpretive services to assist at discharge as needed  - Refer to Case Management Department for coordinating discharge planning if the patient needs post-hospital services based on physician/advanced practitioner order or complex needs related to functional status, cognitive ability, or social support system  Outcome: Progressing     Problem: Knowledge Deficit  Goal: Patient/family/caregiver demonstrates understanding of disease process, treatment plan, medications, and discharge instructions  Description: Complete learning assessment and assess knowledge base    Interventions:  - Provide teaching at level of understanding  - Provide teaching via preferred learning methods  Outcome: Progressing     Problem: GASTROINTESTINAL - ADULT  Goal: Minimal or absence of nausea and/or vomiting  Description: INTERVENTIONS:  - Administer IV fluids if ordered to ensure adequate hydration  - Maintain NPO status until nausea and vomiting are resolved  - Nasogastric tube if ordered  - Administer ordered antiemetic medications as needed  - Provide nonpharmacologic comfort measures as appropriate  - Advance diet as tolerated, if ordered  - Consider nutrition services referral to assist patient with adequate nutrition and appropriate food choices  Outcome: Progressing  Goal: Maintains or returns to baseline bowel function  Description: INTERVENTIONS:  - Assess bowel function  - Encourage oral fluids to ensure adequate hydration  - Administer IV fluids if ordered to ensure adequate hydration  - Administer ordered medications as needed  - Encourage mobilization and activity  - Consider nutritional services referral to assist patient with adequate nutrition and appropriate food choices  Outcome: Progressing  Goal: Maintains adequate nutritional intake  Description: INTERVENTIONS:  - Monitor percentage of each meal consumed  - Identify factors contributing to decreased intake, treat as appropriate  - Assist with meals as needed  - Monitor I&O, weight, and lab values if indicated  - Obtain nutrition services referral as needed  Outcome: Progressing  Goal: Oral mucous membranes remain intact  Description: INTERVENTIONS  - Assess oral mucosa and hygiene practices  - Implement preventative oral hygiene regimen  - Implement oral medicated treatments as ordered  - Initiate Nutrition services referral as needed  Outcome: Progressing

## 2023-02-12 NOTE — PROGRESS NOTES
Progress Note - General Surgery   Josh Montes 50 y o  male MRN: [id number]  Unit/Bed#: -01 Encounter: [id number]    Assessment/Plan  Josh Montes is a 50 y o  male     Colonic ileus with severe cecal dilation requiring colonoscopic decompression and placement of decompression tube  AVSS, No further bowel movements despite receiving neostigmine yesterday   Abdomen remains rounded and distended, softening, tympanic on percussion, non-tender to palpation     Given persistent ileus and abdominal distention, will order CTAP with PO contrast to assess abdomen for any potential mechanical cause of dilation  Further recommendations to be made following imaging   Clear liquid diet at this time as tolerated  Serial abdominal exams  Trend labs, monitor vitals  Pain control/antiemetics PRN  DVT prophylaxis    Subjective/Objective    Subjective: No bowel movements overnight, feeling more distended     Objective:     Blood pressure 140/77, pulse 85, temperature 98 4 °F (36 9 °C), resp  rate 18, height 5' 11" (1 803 m), weight 83 2 kg (183 lb 6 8 oz), SpO2 96 %  ,Body mass index is 25 58 kg/m²  Intake/Output Summary (Last 24 hours) at 2/12/2023 1238  Last data filed at 2/12/2023 0747  Gross per 24 hour   Intake 420 ml   Output --   Net 420 ml       Invasive Devices     Peripheral Intravenous Line  Duration           Peripheral IV 02/11/23 Dorsal (posterior); Left Forearm 1 day                Physical Exam: /77   Pulse 85   Temp 98 4 °F (36 9 °C)   Resp 18   Ht 5' 11" (1 803 m)   Wt 83 2 kg (183 lb 6 8 oz)   SpO2 96%   BMI 25 58 kg/m²   General appearance: alert and oriented, in no acute distress  Lungs: clear to auscultation bilaterally  Heart: regular rate and rhythm, S1, S2 normal, no murmur, click, rub or gallop  Abdomen: firm and distended, tympanic on percussion, hypoactive bowel sounds, non-tender to palpation, incisions c/d/i, some ecchymosis along lower abdomen  Extremities: extremities normal, warm and well-perfused; no cyanosis, clubbing, or edema    Lab, Imaging and other studies:I have personally reviewed pertinent lab results       VTE Pharmacologic Prophylaxis: Heparin  VTE Mechanical Prophylaxis: sequential compression device    Recent Results (from the past 36 hour(s))   CBC and differential    Collection Time: 02/11/23  6:18 AM   Result Value Ref Range    WBC 5 17 4 31 - 10 16 Thousand/uL    RBC 3 97 3 88 - 5 62 Million/uL    Hemoglobin 13 5 12 0 - 17 0 g/dL    Hematocrit 40 3 36 5 - 49 3 %     (H) 82 - 98 fL    MCH 34 0 26 8 - 34 3 pg    MCHC 33 5 31 4 - 37 4 g/dL    RDW 12 3 11 6 - 15 1 %    MPV 10 4 8 9 - 12 7 fL    Platelets 793 (L) 909 - 390 Thousands/uL    nRBC 0 /100 WBCs    Neutrophils Relative 43 43 - 75 %    Immat GRANS % 1 0 - 2 %    Lymphocytes Relative 34 14 - 44 %    Monocytes Relative 17 (H) 4 - 12 %    Eosinophils Relative 4 0 - 6 %    Basophils Relative 1 0 - 1 %    Neutrophils Absolute 2 27 1 85 - 7 62 Thousands/µL    Immature Grans Absolute 0 03 0 00 - 0 20 Thousand/uL    Lymphocytes Absolute 1 77 0 60 - 4 47 Thousands/µL    Monocytes Absolute 0 88 0 17 - 1 22 Thousand/µL    Eosinophils Absolute 0 18 0 00 - 0 61 Thousand/µL    Basophils Absolute 0 04 0 00 - 0 10 Thousands/µL   Basic metabolic panel    Collection Time: 02/11/23  9:06 AM   Result Value Ref Range    Sodium 132 (L) 135 - 147 mmol/L    Potassium 3 4 (L) 3 5 - 5 3 mmol/L    Chloride 95 (L) 96 - 108 mmol/L    CO2 29 21 - 32 mmol/L    ANION GAP 8 4 - 13 mmol/L    BUN 4 (L) 5 - 25 mg/dL    Creatinine 0 69 0 60 - 1 30 mg/dL    Glucose 193 (H) 65 - 140 mg/dL    Calcium 9 4 8 3 - 10 1 mg/dL    eGFR 111 ml/min/1 73sq m   CBC and differential    Collection Time: 02/12/23  4:58 AM   Result Value Ref Range    WBC 5 91 4 31 - 10 16 Thousand/uL    RBC 4 14 3 88 - 5 62 Million/uL    Hemoglobin 13 9 12 0 - 17 0 g/dL    Hematocrit 42 2 36 5 - 49 3 %     (H) 82 - 98 fL    MCH 33 6 26 8 - 34 3 pg    MCHC 32 9 31 4 - 37 4 g/dL    RDW 12 1 11 6 - 15 1 %    MPV 9 6 8 9 - 12 7 fL    Platelets 966 811 - 394 Thousands/uL    nRBC 0 /100 WBCs    Neutrophils Relative 46 43 - 75 %    Immat GRANS % 1 0 - 2 %    Lymphocytes Relative 34 14 - 44 %    Monocytes Relative 15 (H) 4 - 12 %    Eosinophils Relative 3 0 - 6 %    Basophils Relative 1 0 - 1 %    Neutrophils Absolute 2 73 1 85 - 7 62 Thousands/µL    Immature Grans Absolute 0 04 0 00 - 0 20 Thousand/uL    Lymphocytes Absolute 1 99 0 60 - 4 47 Thousands/µL    Monocytes Absolute 0 91 0 17 - 1 22 Thousand/µL    Eosinophils Absolute 0 18 0 00 - 0 61 Thousand/µL    Basophils Absolute 0 06 0 00 - 0 10 Thousands/µL   Basic metabolic panel    Collection Time: 02/12/23  4:58 AM   Result Value Ref Range    Sodium 135 135 - 147 mmol/L    Potassium 4 3 3 5 - 5 3 mmol/L    Chloride 99 96 - 108 mmol/L    CO2 26 21 - 32 mmol/L    ANION GAP 10 4 - 13 mmol/L    BUN 5 5 - 25 mg/dL    Creatinine 0 56 (L) 0 60 - 1 30 mg/dL    Glucose 106 65 - 140 mg/dL    Calcium 10 0 8 3 - 10 1 mg/dL    eGFR 121 ml/min/1 73sq m

## 2023-02-13 LAB
ANION GAP SERPL CALCULATED.3IONS-SCNC: 10 MMOL/L (ref 4–13)
BASOPHILS # BLD AUTO: 0.05 THOUSANDS/ÂΜL (ref 0–0.1)
BASOPHILS NFR BLD AUTO: 1 % (ref 0–1)
BUN SERPL-MCNC: 5 MG/DL (ref 5–25)
CALCIUM SERPL-MCNC: 9.3 MG/DL (ref 8.3–10.1)
CHLORIDE SERPL-SCNC: 100 MMOL/L (ref 96–108)
CO2 SERPL-SCNC: 26 MMOL/L (ref 21–32)
CREAT SERPL-MCNC: 0.53 MG/DL (ref 0.6–1.3)
EOSINOPHIL # BLD AUTO: 0.2 THOUSAND/ÂΜL (ref 0–0.61)
EOSINOPHIL NFR BLD AUTO: 4 % (ref 0–6)
ERYTHROCYTE [DISTWIDTH] IN BLOOD BY AUTOMATED COUNT: 12.2 % (ref 11.6–15.1)
GFR SERPL CREATININE-BSD FRML MDRD: 124 ML/MIN/1.73SQ M
GLUCOSE SERPL-MCNC: 114 MG/DL (ref 65–140)
HCT VFR BLD AUTO: 37.7 % (ref 36.5–49.3)
HGB BLD-MCNC: 12.6 G/DL (ref 12–17)
IMM GRANULOCYTES # BLD AUTO: 0.03 THOUSAND/UL (ref 0–0.2)
IMM GRANULOCYTES NFR BLD AUTO: 1 % (ref 0–2)
LYMPHOCYTES # BLD AUTO: 1.78 THOUSANDS/ÂΜL (ref 0.6–4.47)
LYMPHOCYTES NFR BLD AUTO: 32 % (ref 14–44)
MCH RBC QN AUTO: 34 PG (ref 26.8–34.3)
MCHC RBC AUTO-ENTMCNC: 33.4 G/DL (ref 31.4–37.4)
MCV RBC AUTO: 102 FL (ref 82–98)
MONOCYTES # BLD AUTO: 0.77 THOUSAND/ÂΜL (ref 0.17–1.22)
MONOCYTES NFR BLD AUTO: 14 % (ref 4–12)
NEUTROPHILS # BLD AUTO: 2.73 THOUSANDS/ÂΜL (ref 1.85–7.62)
NEUTS SEG NFR BLD AUTO: 48 % (ref 43–75)
NRBC BLD AUTO-RTO: 0 /100 WBCS
PLATELET # BLD AUTO: 147 THOUSANDS/UL (ref 149–390)
PMV BLD AUTO: 9.9 FL (ref 8.9–12.7)
POTASSIUM SERPL-SCNC: 3.9 MMOL/L (ref 3.5–5.3)
RBC # BLD AUTO: 3.71 MILLION/UL (ref 3.88–5.62)
SODIUM SERPL-SCNC: 136 MMOL/L (ref 135–147)
WBC # BLD AUTO: 5.56 THOUSAND/UL (ref 4.31–10.16)

## 2023-02-13 RX ORDER — LISINOPRIL 20 MG/1
20 TABLET ORAL DAILY
Status: DISCONTINUED | OUTPATIENT
Start: 2023-02-14 | End: 2023-02-14 | Stop reason: HOSPADM

## 2023-02-13 RX ORDER — FAMOTIDINE 20 MG/1
20 TABLET, FILM COATED ORAL ONCE
Status: COMPLETED | OUTPATIENT
Start: 2023-02-13 | End: 2023-02-13

## 2023-02-13 RX ORDER — AMLODIPINE BESYLATE 5 MG/1
5 TABLET ORAL DAILY
Status: DISCONTINUED | OUTPATIENT
Start: 2023-02-14 | End: 2023-02-14 | Stop reason: HOSPADM

## 2023-02-13 RX ADMIN — FAMOTIDINE 20 MG: 10 INJECTION, SOLUTION INTRAVENOUS at 08:37

## 2023-02-13 RX ADMIN — HEPARIN SODIUM 5000 UNITS: 5000 INJECTION INTRAVENOUS; SUBCUTANEOUS at 13:55

## 2023-02-13 RX ADMIN — FAMOTIDINE 20 MG: 20 TABLET, FILM COATED ORAL at 22:04

## 2023-02-13 RX ADMIN — HEPARIN SODIUM 5000 UNITS: 5000 INJECTION INTRAVENOUS; SUBCUTANEOUS at 06:14

## 2023-02-13 RX ADMIN — HYDROMORPHONE HYDROCHLORIDE 0.5 MG: 1 INJECTION, SOLUTION INTRAMUSCULAR; INTRAVENOUS; SUBCUTANEOUS at 11:05

## 2023-02-13 RX ADMIN — OXYCODONE HYDROCHLORIDE 10 MG: 10 TABLET ORAL at 22:04

## 2023-02-13 RX ADMIN — OXYCODONE HYDROCHLORIDE 10 MG: 10 TABLET ORAL at 04:28

## 2023-02-13 RX ADMIN — HYDROMORPHONE HYDROCHLORIDE 0.5 MG: 1 INJECTION, SOLUTION INTRAMUSCULAR; INTRAVENOUS; SUBCUTANEOUS at 15:02

## 2023-02-13 RX ADMIN — OXYCODONE HYDROCHLORIDE 10 MG: 10 TABLET ORAL at 17:54

## 2023-02-13 RX ADMIN — DEXTROSE, SODIUM CHLORIDE, AND POTASSIUM CHLORIDE 50 ML/HR: 5; .45; .15 INJECTION INTRAVENOUS at 11:06

## 2023-02-13 RX ADMIN — HYDROMORPHONE HYDROCHLORIDE 0.5 MG: 1 INJECTION, SOLUTION INTRAMUSCULAR; INTRAVENOUS; SUBCUTANEOUS at 06:14

## 2023-02-13 NOTE — ASSESSMENT & PLAN NOTE
Presented with significant constipation  Reports no bowel movements for 5 days  CT on admission showed dilation of colon with stool burden high risk for perforation    Repeat CT showed numerous hematomas as well as slightly improvement of the colonic distention  Surgery primary team  Currently advance to full liquid diet  Defer treatment plan to surgery team

## 2023-02-13 NOTE — MALNUTRITION/BMI
This medical record reflects one or more clinical indicators suggestive of malnutrition  Malnutrition Findings:   Adult Malnutrition type: Acute illness  Adult Degree of Malnutrition: Other severe protein calorie malnutrition  Malnutrition Characteristics: Inadequate energy, Weight loss                  360 Statement: related to inadequate energy intake as evidenced by consuiming < 50% of energy intake compared to estimated needs for > 5 days and 10 7% (22#) weight loss in 3 months  Treated with full liquid diet  See Nutrition note dated 2/13/2023 for additional details  Completed nutrition assessment is viewable in the nutrition documentation

## 2023-02-13 NOTE — PROGRESS NOTES
[facility]  Progress Note - Brittany Brown 1975, 50 y o  male MRN: [id number]  Unit/Bed#: -Rosie Encounter: [id number]  Primary Care Provider: LUANA Cruz   Date and time admitted to hospital: 2/6/2023  8:23 PM    * Constipation  Assessment & Plan  Presented with significant constipation  Reports no bowel movements for 5 days  CT on admission showed dilation of colon with stool burden high risk for perforation    Repeat CT showed numerous hematomas as well as slightly improvement of the colonic distention  Surgery primary team  Currently advance to full liquid diet  Defer treatment plan to surgery team    Hypertension  Assessment & Plan  Blood pressure controlled  Currently 158/89, will restart p o  blood pressure medication    GERD (gastroesophageal reflux disease)  Assessment & Plan  Continue place on IV Pepcid        VTE Pharmacologic Prophylaxis:   Moderate Risk (Score 3-4) - Pharmacological DVT Prophylaxis Contraindicated  Sequential Compression Devices Ordered  Patient Centered Rounds: I performed bedside rounds with nursing staff today  Discussions with Specialists or Other Care Team Provider: allan Dotson    Education and Discussions with Family / Patient: Patient declined call to   Time Spent for Care: 30 minutes  More than 50% of total time spent on counseling and coordination of care as described above  Current Length of Stay: 5 day(s)  Current Patient Status: Inpatient   Certification Statement: The patient will continue to require additional inpatient hospital stay due to Patient is followed by surgery  Discharge Plan: [facility] is following this patient on consult  They are medically stable for discharge when deemed appropriate by primary service  Code Status: Level 1 - Full Code    Subjective:   Patient reports having continued left-sided abdominal pain and is still currently not producing gas or having bowel movements    He continues to have nausea  He  denies lightheadedness, shortness of breath, chest pressure/pain, or palpitations  Objective:     Vitals:   Temp (24hrs), Av 8 °F (36 6 °C), Min:97 6 °F (36 4 °C), Max:98 2 °F (36 8 °C)    Temp:  [97 6 °F (36 4 °C)-98 2 °F (36 8 °C)] 98 2 °F (36 8 °C)  HR:  [70-80] 74  Resp:  [17-18] 17  BP: (132-158)/(70-91) 158/89  SpO2:  [96 %-98 %] 96 %  Body mass index is 25 58 kg/m²  Input and Output Summary (last 24 hours): Intake/Output Summary (Last 24 hours) at 2023 1623  Last data filed at 2023 1248  Gross per 24 hour   Intake 4200 ml   Output 50 ml   Net 4150 ml       Physical Exam:   Physical Exam  Vitals and nursing note reviewed  Constitutional:       Appearance: He is normal weight  HENT:      Head: Normocephalic  Nose: Nose normal       Mouth/Throat:      Mouth: Mucous membranes are moist       Pharynx: Oropharynx is clear  Eyes:      General: No scleral icterus  Conjunctiva/sclera: Conjunctivae normal       Pupils: Pupils are equal, round, and reactive to light  Cardiovascular:      Rate and Rhythm: Normal rate and regular rhythm  Heart sounds: No murmur heard  No friction rub  No gallop  Pulmonary:      Effort: Pulmonary effort is normal  No respiratory distress  Breath sounds: Normal breath sounds  No stridor  No wheezing, rhonchi or rales  Abdominal:      General: Abdomen is flat  There is distension  Palpations: Abdomen is soft  There is no mass  Tenderness: There is abdominal tenderness  There is no guarding  Musculoskeletal:         General: Normal range of motion  Cervical back: Normal range of motion and neck supple  Right lower leg: No edema  Left lower leg: No edema  Lymphadenopathy:      Cervical: No cervical adenopathy  Skin:     General: Skin is warm  Coloration: Skin is not jaundiced or pale  Findings: No bruising, erythema or lesion  Neurological:      General: No focal deficit present  Mental Status: He is alert and oriented to person, place, and time  Mental status is at baseline  Cranial Nerves: No cranial nerve deficit  Motor: No weakness  Psychiatric:         Mood and Affect: Mood normal          Behavior: Behavior normal          Thought Content: Thought content normal           Additional Data:     Labs:  Results from last 7 days   Lab Units 02/13/23  0506   WBC Thousand/uL 5 56   HEMOGLOBIN g/dL 12 6   HEMATOCRIT % 37 7   PLATELETS Thousands/uL 147*   NEUTROS PCT % 48   LYMPHS PCT % 32   MONOS PCT % 14*   EOS PCT % 4     Results from last 7 days   Lab Units 02/13/23  0506 02/08/23  0508 02/07/23  0420   SODIUM mmol/L 136   < > 134*   POTASSIUM mmol/L 3 9   < > 3 7   CHLORIDE mmol/L 100   < > 99   CO2 mmol/L 26   < > 28   BUN mg/dL 5   < > 13   CREATININE mg/dL 0 53*   < > 0 50*   ANION GAP mmol/L 10   < > 7   CALCIUM mg/dL 9 3   < > 8 1*   ALBUMIN g/dL  --   --  3 2*   TOTAL BILIRUBIN mg/dL  --   --  1 72*   ALK PHOS U/L  --   --  148*   ALT U/L  --   --  73   AST U/L  --   --  66*   GLUCOSE RANDOM mg/dL 114   < > 95    < > = values in this interval not displayed  Results from last 7 days   Lab Units 02/10/23  2010   POC GLUCOSE mg/dl 107         Results from last 7 days   Lab Units 02/06/23  2301   LACTIC ACID mmol/L 0 8       Lines/Drains:  Invasive Devices     Peripheral Intravenous Line  Duration           Peripheral IV 02/11/23 Dorsal (posterior); Left Forearm 2 days                      Imaging: Reviewed radiology reports from this admission including: abdominal/pelvic CT    Recent Cultures (last 7 days):   Results from last 7 days   Lab Units 02/08/23  0857   C DIFF TOXIN B BY PCR  Negative       Last 24 Hours Medication List:   Current Facility-Administered Medications   Medication Dose Route Frequency Provider Last Rate   • acetaminophen  650 mg Oral Q6H PRN Koko Sherwood PA-C     • [START ON 2/14/2023] amLODIPine  5 mg Oral Daily Wandy Vieira PA-C • famotidine  20 mg Intravenous Q12H [facility] Anna Bernal MD     • heparin (porcine)  5,000 Units Subcutaneous [facility] Anna Bernal MD     • HYDROmorphone  0 5 mg Intravenous Q4H PRN Melanie Lindquist PA-C     • [START ON 2/14/2023] lisinopril  20 mg Oral Daily Aleisha Vieira PA-C     • ondansetron  4 mg Intravenous Q6H PRN Anna Bernal MD     • oxyCODONE  10 mg Oral Q4H PRN Melanie Lindquist PA-C     • oxyCODONE  5 mg Oral Q4H PRN Melanie Lindquist PA-C          Today, Patient Was Seen By: Aleisha Vieira PA-C    **Please Note: This note may have been constructed using a voice recognition system  **

## 2023-02-13 NOTE — PLAN OF CARE
Problem: PAIN - ADULT  Goal: Verbalizes/displays adequate comfort level or baseline comfort level  Description: Interventions:  - Encourage patient to monitor pain and request assistance  - Assess pain using appropriate pain scale  - Administer analgesics based on type and severity of pain and evaluate response  - Implement non-pharmacological measures as appropriate and evaluate response  - Consider cultural and social influences on pain and pain management  - Notify physician/advanced practitioner if interventions unsuccessful or patient reports new pain  Outcome: Progressing     Problem: INFECTION - ADULT  Goal: Absence or prevention of progression during hospitalization  Description: INTERVENTIONS:  - Assess and monitor for signs and symptoms of infection  - Monitor lab/diagnostic results  - Monitor all insertion sites, i e  indwelling lines, tubes, and drains  - Monitor endotracheal if appropriate and nasal secretions for changes in amount and color  - [place] appropriate cooling/warming therapies per order  - Administer medications as ordered  - Instruct and encourage patient and family to use good hand hygiene technique  - Identify and instruct in appropriate isolation precautions for identified infection/condition  Outcome: Progressing  Goal: Absence of fever/infection during neutropenic period  Description: INTERVENTIONS:  - Monitor WBC    Outcome: Progressing     Problem: SAFETY ADULT  Goal: Patient will remain free of falls  Description: INTERVENTIONS:  - Educate patient/family on patient safety including physical limitations  - Instruct patient to call for assistance with activity   - Consult OT/PT to assist with strengthening/mobility   - Keep Call bell within reach  - Keep bed low and locked with side rails adjusted as appropriate  - Keep care items and personal belongings within reach  - Initiate and maintain comfort rounds  - Make Fall Risk Sign visible to staff  - Offer Toileting every  Hours, in advance of need  - Initiate/Maintain alarm  - Obtain necessary fall risk management equipment:   - Apply yellow socks and bracelet for high fall risk patients  - Consider moving patient to room near nurses station  Outcome: Progressing  Goal: Maintain or return to baseline ADL function  Description: INTERVENTIONS:  -  Assess patient's ability to carry out ADLs; assess patient's baseline for ADL function and identify physical deficits which impact ability to perform ADLs (bathing, care of mouth/teeth, toileting, grooming, dressing, etc )  - Assess/evaluate cause of self-care deficits   - Assess range of motion  - Assess patient's mobility; develop plan if impaired  - Assess patient's need for assistive devices and provide as appropriate  - Encourage maximum independence but intervene and supervise when necessary  - Involve family in performance of ADLs  - Assess for home care needs following discharge   - Consider OT consult to assist with ADL evaluation and planning for discharge  - Provide patient education as appropriate  Outcome: Progressing  Goal: Maintains/Returns to pre admission functional level  Description: INTERVENTIONS:  - Perform BMAT or MOVE assessment daily    - Set and communicate daily mobility goal to care team and patient/family/caregiver  - Collaborate with rehabilitation services on mobility goals if consulted  - Perform Range of Motion  times a day  - Reposition patient every  hours    - Dangle patient  times a day  - Stand patient  times a day  - Ambulate patient  times a day  - Out of bed to chair  times a day   - Out of bed for meals  times a day  - Out of bed for toileting  - Record patient progress and toleration of activity level   Outcome: Progressing     Problem: DISCHARGE PLANNING  Goal: Discharge to home or other facility with appropriate resources  Description: INTERVENTIONS:  - Identify barriers to discharge w/patient and caregiver  - Arrange for needed discharge resources and transportation as appropriate  - Identify discharge learning needs (meds, wound care, etc )  - Arrange for interpretive services to assist at discharge as needed  - Refer to Case Management Department for coordinating discharge planning if the patient needs post-hospital services based on physician/advanced practitioner order or complex needs related to functional status, cognitive ability, or social support system  Outcome: Progressing     Problem: Knowledge Deficit  Goal: Patient/family/caregiver demonstrates understanding of disease process, treatment plan, medications, and discharge instructions  Description: Complete learning assessment and assess knowledge base    Interventions:  - Provide teaching at level of understanding  - Provide teaching via preferred learning methods  Outcome: Progressing     Problem: GASTROINTESTINAL - ADULT  Goal: Minimal or absence of nausea and/or vomiting  Description: INTERVENTIONS:  - Administer IV fluids if ordered to ensure adequate hydration  - Maintain NPO status until nausea and vomiting are resolved  - Nasogastric tube if ordered  - Administer ordered antiemetic medications as needed  - Provide nonpharmacologic comfort measures as appropriate  - Advance diet as tolerated, if ordered  - Consider nutrition services referral to assist patient with adequate nutrition and appropriate food choices  Outcome: Progressing  Goal: Maintains or returns to baseline bowel function  Description: INTERVENTIONS:  - Assess bowel function  - Encourage oral fluids to ensure adequate hydration  - Administer IV fluids if ordered to ensure adequate hydration  - Administer ordered medications as needed  - Encourage mobilization and activity  - Consider nutritional services referral to assist patient with adequate nutrition and appropriate food choices  Outcome: Progressing  Goal: Maintains adequate nutritional intake  Description: INTERVENTIONS:  - Monitor percentage of each meal consumed  - Identify factors contributing to decreased intake, treat as appropriate  - Assist with meals as needed  - Monitor I&O, weight, and lab values if indicated  - Obtain nutrition services referral as needed  Outcome: Progressing  Goal: Oral mucous membranes remain intact  Description: INTERVENTIONS  - Assess oral mucosa and hygiene practices  - Implement preventative oral hygiene regimen  - Implement oral medicated treatments as ordered  - Initiate Nutrition services referral as needed  Outcome: Progressing

## 2023-02-13 NOTE — PROGRESS NOTES
Progress Note -Surgery PA  Taylor Wheatcroft 50 y o  male MRN: [id number]  Unit/Bed#: -01 Encounter: [id number]      Assessment   51y M w Colonic ileus with severe cecal dilation requiring colonoscopic decompression and placement of decompression tube  - decompression tube no longer in place since 2/10/23  - CT AP w oral contrast 2/12/23:  No discernible evidence for small or large bowel obstruction  Colonic distention overall has slightly improved with mild dilatation of the transverse colon persisting  Moderate stool in the right colon  New 2 cm probable hematoma in the omental fat adjacent to the inferior right hepatic lobe  New small quantity of pelvic free fluid with attenuation higher than simple fluid, also likely reflecting hemorrhage  New 3 9 x 1 2 x 2 4 cm hematoma in the space of Retzius to the left of midline   - Hgb 12 5 (13 9, 13 5, 12 5, 12 5, 12 6), VSS, afebrile, no leukocytosis  Plan   -- Discussed finding and exam with Dr David Barros  Will plan to advance diet to full liquid and monitor tolerating   -- discontinue IVF  -- Monitor abdominal exams  -- Monitor labs  -- continue to encourage ambulation and incentive spirometry  -- analgesia and antiemetics prn  -- DVT propylaxis    ______________________________________________________________________  Subjective:   Passed small amount of flatus last night  No BM since Saturday morning  Still feeling full quickly with small amounts of oral intake and becoming distended, but this does resolve  Denies n/v  RLQ pain but controlled  Objective:    Vitals:  /70   Pulse 70   Temp 97 7 °F (36 5 °C)   Resp 18   Ht 5' 11" (1 803 m)   Wt 83 2 kg (183 lb 6 8 oz)   SpO2 96%   BMI 25 58 kg/m²     I/Os:  I/O last 3 completed shifts: In: 1380 [P O :1380]  Out: -     I/O this shift:  In: 1400 [P O :1400]  Out: 50 [Urine:50]    Invasive Devices     Peripheral Intravenous Line  Duration           Peripheral IV 02/11/23 Dorsal (posterior); Left Forearm 2 days                Medications:  Current Facility-Administered Medications   Medication Dose Route Frequency   • acetaminophen (TYLENOL) tablet 650 mg  650 mg Oral Q6H PRN   • dextrose 5 % and sodium chloride 0 45 % with KCl 20 mEq/L infusion  50 mL/hr Intravenous Continuous   • Famotidine (PF) (PEPCID) injection 20 mg  20 mg Intravenous Q12H [facility]   • heparin (porcine) subcutaneous injection 5,000 Units  5,000 Units Subcutaneous Q8H [facility]   • HYDROmorphone (DILAUDID) injection 0 5 mg  0 5 mg Intravenous Q4H PRN   • ondansetron (ZOFRAN) injection 4 mg  4 mg Intravenous Q6H PRN   • oxyCODONE (ROXICODONE) immediate release tablet 10 mg  10 mg Oral Q4H PRN   • oxyCODONE (ROXICODONE) IR tablet 5 mg  5 mg Oral Q4H PRN                 Lab Results and Cultures:   CBC with diff:   Lab Results   Component Value Date    WBC 5 56 02/13/2023    HGB 12 6 02/13/2023    HCT 37 7 02/13/2023     (H) 02/13/2023     (L) 02/13/2023    MCH 34 0 02/13/2023    MCHC 33 4 02/13/2023    RDW 12 2 02/13/2023    MPV 9 9 02/13/2023    NRBC 0 02/13/2023       BMP/CMP:  Lab Results   Component Value Date     04/26/2017    K 3 9 02/13/2023    K 4 2 01/23/2023     02/13/2023    CL 98 01/23/2023    CO2 26 02/13/2023    CO2 29 01/23/2023    BUN 5 02/13/2023    BUN 14 01/23/2023    CREATININE 0 53 (L) 02/13/2023    CREATININE 0 80 04/26/2017    CALCIUM 9 3 02/13/2023    CALCIUM 9 5 01/23/2023    AST 66 (H) 02/07/2023    AST 59 (H) 01/23/2023    ALT 73 02/07/2023    ALT 48 (H) 01/23/2023    ALKPHOS 148 (H) 02/07/2023    ALKPHOS 138 (H) 01/23/2023    PROT 7 3 04/26/2017    BILITOT 1 8 (H) 04/26/2017    EGFR 124 02/13/2023       Lipid Panel:   Lab Results   Component Value Date    CHOL 114 (L) 04/26/2017       Coags:   Lab Results   Component Value Date    PTT 36 05/14/2020    INR 1 1 01/23/2023    INR 1 27 (H) 05/14/2020        Urinalysis:   Lab Results   Component Value Date    COLORU Yellow 02/06/2023    COLORU YELLOW 09/15/2016    CLARITYU Clear 02/06/2023    SPECGRAV >=1 050 (H) 02/06/2023    SPECGRAV 1 007 09/15/2016    PHUR 7 0 02/06/2023    PHUR 6 5 09/11/2018    PHUR 7 0 09/15/2016    LEUKOCYTESUR Negative 02/06/2023    LEUKOCYTESUR NEGATIVE 09/15/2016    NITRITE Negative 02/06/2023    NITRITE NEGATIVE 09/15/2016    PROTEINUA NEGATIVE 09/15/2016    GLUCOSEU 1000 (1%) (A) 02/06/2023    KETONESU Trace (A) 02/06/2023    KETONESU NEGATIVE 09/15/2016    BILIRUBINUR Negative 02/06/2023    BILIRUBINUR NEGATIVE 09/15/2016    BLOODU Negative 02/06/2023        Urine Culture: No results found for: URINECX   Wound Culure: No results found for: WOUNDCULT  Blood Culture:   Lab Results   Component Value Date    BLOODCX No Growth After 5 Days  08/20/2019    BLOODCX No Growth After 5 Days  08/20/2019         Physical Exam:  General Appearance:    Alert and orientated x 3, cooperative, no distress, appears stated age   Lungs:     Clear to auscultation bilaterally, respirations unlabored, no wheezes    Heart:    Regular rate and rhythm, S1 and S2 normal   Abdomen:    Normoactive BS, soft, non tender, non rigid, no masses, no palpated organomegaly, eccymosis from subcutaneous injections  Wound/Dressing:  C/d/i, no drainage, no erythema, no fluctuance eccymosis at periumbilcal incision, resolving;    Extremities:  Extremities normal, no calf tenderness, no cyanosis or edema   Pulses:   2+ and symmetric all extremities   Skin:   Skin color, texture, turgor normal, no rashes   Neurologic:   CNII-XII intact, normal strength, affect appropriate       Imaging:  Colonoscopy    Result Date: 2/8/2023  Impression: 1  Small bowel and colonic ileus, status post decompression and placement of a colonic decompression tube RECOMMENDATION: 1  Do not place this decompression tube on intermittent suction 2  Manual suction will be performed in the event that the patient is redeveloping colonic ileus 3  Decompression tube has been taped to the leg    Holly Damon DO Bridgette FACG, FACP    XR abdomen 1 view kub    Result Date: 2/11/2023  Impression: Small and large bowel distention similar to the prior study, may represent ileus  Workstation performed: [id number]     XR abdomen 1 view kub    Result Date: 2/10/2023  Impression: Moderate gaseous distention of transverse colon, estimated at up to 8 cm in caliber, unchanged from examination of one day earlier  Mild gaseous distention of small bowel loops  Workstation performed: [id number]     XR abdomen 1 view kub    Result Date: 2/10/2023  Impression: Moderate gaseous distention of transverse colon, estimated at up to 8 cm in caliber, slightly less severe as compared with radiograph performed with the decompression tube in place on February 9, 2023  Workstation performed: [id number]     XR abdomen 1 view kub    Result Date: 2/9/2023  Impression: Status post insertion of the colonic decompression tube, with tip in the transverse colon  Diffuse large bowel distention again seen consistent with ileus, improved from priors  Workstation performed: [id number]     XR abdomen 1 view kub    Result Date: 2/8/2023  Impression: Dilatation of the ascending and transverse colon with a maximum diameter of the ascending colon of up to 8 cm  Close follow-up recommended  The study was marked in EPIC for significant notification  Workstation performed: [id number]     XR abdomen 1 view kub    Result Date: 2/8/2023  Impression: Nonobstructive bowel gas pattern with gas-filled the small bowel loops and colonic loops compatible with ileus Gas-filled cecum noted with the diameter measuring 9 5 cm Workstation performed: [id number]     FL < 1 hour    Result Date: 2/9/2023  Impression: Fluoroscopic guidance provided for procedure guidance  Please refer to the separate procedure notes for additional details   Workstation performed: [id number]     CT abdomen pelvis with contrast    Result Date: 2/6/2023  Impression: Liquid stool within the colon with marked distention of the colon  Ascending colon measuring up to 7 cm  Findings may represent colitis  Given the size of the colon patient is at a high risk for perforation  Pneumoperitoneum which may be due to prior surgery however cannot rule out postsurgical complication or other etiologies  Cirrhotic liver with signs of portal hypertension  Thickening of the urinary bladder wall with surrounding inflammatory changes likely representing cystitis  Follow-up with urology is recommended    I personally discussed this study with Delma Rincon on 2/6/2023 at 10:46 PM  Workstation performed: [id number]       VTE Pharmacologic Prophylaxis: Heparin  VTE Mechanical Prophylaxis: sequential compression device    Jerzy Llanos PA-C   2/13/2023

## 2023-02-13 NOTE — PLAN OF CARE
Problem: PAIN - ADULT  Goal: Verbalizes/displays adequate comfort level or baseline comfort level  Description: Interventions:  - Encourage patient to monitor pain and request assistance  - Assess pain using appropriate pain scale  - Administer analgesics based on type and severity of pain and evaluate response  - Implement non-pharmacological measures as appropriate and evaluate response  - Consider cultural and social influences on pain and pain management  - Notify physician/advanced practitioner if interventions unsuccessful or patient reports new pain  Outcome: Progressing     Problem: INFECTION - ADULT  Goal: Absence or prevention of progression during hospitalization  Description: INTERVENTIONS:  - Assess and monitor for signs and symptoms of infection  - Monitor lab/diagnostic results  - Monitor all insertion sites, i e  indwelling lines, tubes, and drains  - Monitor endotracheal if appropriate and nasal secretions for changes in amount and color  - [place] appropriate cooling/warming therapies per order  - Administer medications as ordered  - Instruct and encourage patient and family to use good hand hygiene technique  - Identify and instruct in appropriate isolation precautions for identified infection/condition  Outcome: Progressing  Goal: Absence of fever/infection during neutropenic period  Description: INTERVENTIONS:  - Monitor WBC    Outcome: Progressing     Problem: SAFETY ADULT  Goal: Patient will remain free of falls  Description: INTERVENTIONS:  - Educate patient/family on patient safety including physical limitations  - Instruct patient to call for assistance with activity   - Consult OT/PT to assist with strengthening/mobility   - Keep Call bell within reach  - Keep bed low and locked with side rails adjusted as appropriate  - Keep care items and personal belongings within reach  - Initiate and maintain comfort rounds  - Make Fall Risk Sign visible to staff  - Offer Toileting every Hours, in advance of need  - Initiate/Maintain alarm  - Obtain necessary fall risk management equipment:   - Apply yellow socks and bracelet for high fall risk patients  - Consider moving patient to room near nurses station  Outcome: Progressing  Goal: Maintain or return to baseline ADL function  Description: INTERVENTIONS:  -  Assess patient's ability to carry out ADLs; assess patient's baseline for ADL function and identify physical deficits which impact ability to perform ADLs (bathing, care of mouth/teeth, toileting, grooming, dressing, etc )  - Assess/evaluate cause of self-care deficits   - Assess range of motion  - Assess patient's mobility; develop plan if impaired  - Assess patient's need for assistive devices and provide as appropriate  - Encourage maximum independence but intervene and supervise when necessary  - Involve family in performance of ADLs  - Assess for home care needs following discharge   - Consider OT consult to assist with ADL evaluation and planning for discharge  - Provide patient education as appropriate  Outcome: Progressing  Goal: Maintains/Returns to pre admission functional level  Description: INTERVENTIONS:  - Perform BMAT or MOVE assessment daily    - Set and communicate daily mobility goal to care team and patient/family/caregiver  - Collaborate with rehabilitation services on mobility goals if consulted  - Perform Range of Motion  times a day  - Reposition patient every  hours    - Dangle patient  times a day  - Stand patient  times a day  - Ambulate patient  times a day  - Out of bed to chair  times a day   - Out of bed for meals times a day  - Out of bed for toileting  - Record patient progress and toleration of activity level   Outcome: Progressing     Problem: DISCHARGE PLANNING  Goal: Discharge to home or other facility with appropriate resources  Description: INTERVENTIONS:  - Identify barriers to discharge w/patient and caregiver  - Arrange for needed discharge resources and transportation as appropriate  - Identify discharge learning needs (meds, wound care, etc )  - Arrange for interpretive services to assist at discharge as needed  - Refer to Case Management Department for coordinating discharge planning if the patient needs post-hospital services based on physician/advanced practitioner order or complex needs related to functional status, cognitive ability, or social support system  Outcome: Progressing     Problem: Knowledge Deficit  Goal: Patient/family/caregiver demonstrates understanding of disease process, treatment plan, medications, and discharge instructions  Description: Complete learning assessment and assess knowledge base    Interventions:  - Provide teaching at level of understanding  - Provide teaching via preferred learning methods  Outcome: Progressing     Problem: GASTROINTESTINAL - ADULT  Goal: Minimal or absence of nausea and/or vomiting  Description: INTERVENTIONS:  - Administer IV fluids if ordered to ensure adequate hydration  - Maintain NPO status until nausea and vomiting are resolved  - Nasogastric tube if ordered  - Administer ordered antiemetic medications as needed  - Provide nonpharmacologic comfort measures as appropriate  - Advance diet as tolerated, if ordered  - Consider nutrition services referral to assist patient with adequate nutrition and appropriate food choices  Outcome: Progressing  Goal: Maintains or returns to baseline bowel function  Description: INTERVENTIONS:  - Assess bowel function  - Encourage oral fluids to ensure adequate hydration  - Administer IV fluids if ordered to ensure adequate hydration  - Administer ordered medications as needed  - Encourage mobilization and activity  - Consider nutritional services referral to assist patient with adequate nutrition and appropriate food choices  Outcome: Progressing  Goal: Maintains adequate nutritional intake  Description: INTERVENTIONS:  - Monitor percentage of each meal consumed  - Identify factors contributing to decreased intake, treat as appropriate  - Assist with meals as needed  - Monitor I&O, weight, and lab values if indicated  - Obtain nutrition services referral as needed  Outcome: Progressing  Goal: Oral mucous membranes remain intact  Description: INTERVENTIONS  - Assess oral mucosa and hygiene practices  - Implement preventative oral hygiene regimen  - Implement oral medicated treatments as ordered  - Initiate Nutrition services referral as needed  Outcome: Progressing     Problem: Nutrition/Hydration-ADULT  Goal: Nutrient/Hydration intake appropriate for improving, restoring or maintaining nutritional needs  Description: Monitor and assess patient's nutrition/hydration status for malnutrition  Collaborate with interdisciplinary team and initiate plan and interventions as ordered  Monitor patient's weight and dietary intake as ordered or per policy  Utilize nutrition screening tool and intervene as necessary  Determine patient's food preferences and provide high-protein, high-caloric foods as appropriate       INTERVENTIONS:  - Monitor oral intake, urinary output, labs, and treatment plans  - Assess nutrition and hydration status and recommend course of action  - Evaluate amount of meals eaten  - Assist patient with eating if necessary   - Allow adequate time for meals  - Recommend/ encourage appropriate diets, oral nutritional supplements, and vitamin/mineral supplements  - Order, calculate, and assess calorie counts as needed  - Recommend, monitor, and adjust tube feedings and TPN/PPN based on assessed needs  - Assess need for intravenous fluids  - Provide specific nutrition/hydration education as appropriate  - Include patient/family/caregiver in decisions related to nutrition  Outcome: Progressing

## 2023-02-14 VITALS
SYSTOLIC BLOOD PRESSURE: 124 MMHG | HEIGHT: 71 IN | WEIGHT: 183.42 LBS | HEART RATE: 80 BPM | RESPIRATION RATE: 16 BRPM | OXYGEN SATURATION: 95 % | DIASTOLIC BLOOD PRESSURE: 88 MMHG | TEMPERATURE: 97.8 F | BODY MASS INDEX: 25.68 KG/M2

## 2023-02-14 DIAGNOSIS — G47.00 INSOMNIA, UNSPECIFIED TYPE: ICD-10-CM

## 2023-02-14 DIAGNOSIS — J45.909 UNCOMPLICATED ASTHMA, UNSPECIFIED ASTHMA SEVERITY, UNSPECIFIED WHETHER PERSISTENT: ICD-10-CM

## 2023-02-14 DIAGNOSIS — I10 ESSENTIAL HYPERTENSION: ICD-10-CM

## 2023-02-14 PROBLEM — E43 SEVERE PROTEIN-CALORIE MALNUTRITION (HCC): Status: ACTIVE | Noted: 2023-02-14

## 2023-02-14 LAB
ANION GAP SERPL CALCULATED.3IONS-SCNC: 9 MMOL/L (ref 4–13)
BUN SERPL-MCNC: 6 MG/DL (ref 5–25)
CALCIUM SERPL-MCNC: 9.5 MG/DL (ref 8.3–10.1)
CHLORIDE SERPL-SCNC: 102 MMOL/L (ref 96–108)
CO2 SERPL-SCNC: 27 MMOL/L (ref 21–32)
CREAT SERPL-MCNC: 0.56 MG/DL (ref 0.6–1.3)
ERYTHROCYTE [DISTWIDTH] IN BLOOD BY AUTOMATED COUNT: 12.1 % (ref 11.6–15.1)
GFR SERPL CREATININE-BSD FRML MDRD: 121 ML/MIN/1.73SQ M
GLUCOSE SERPL-MCNC: 122 MG/DL (ref 65–140)
HCT VFR BLD AUTO: 39.3 % (ref 36.5–49.3)
HGB BLD-MCNC: 12.8 G/DL (ref 12–17)
MCH RBC QN AUTO: 33.7 PG (ref 26.8–34.3)
MCHC RBC AUTO-ENTMCNC: 32.6 G/DL (ref 31.4–37.4)
MCV RBC AUTO: 103 FL (ref 82–98)
PLATELET # BLD AUTO: 143 THOUSANDS/UL (ref 149–390)
PMV BLD AUTO: 10.1 FL (ref 8.9–12.7)
POTASSIUM SERPL-SCNC: 4 MMOL/L (ref 3.5–5.3)
RBC # BLD AUTO: 3.8 MILLION/UL (ref 3.88–5.62)
SODIUM SERPL-SCNC: 138 MMOL/L (ref 135–147)
WBC # BLD AUTO: 5.75 THOUSAND/UL (ref 4.31–10.16)

## 2023-02-14 RX ORDER — DOCUSATE SODIUM 100 MG/1
100 CAPSULE, LIQUID FILLED ORAL 2 TIMES DAILY
Qty: 30 CAPSULE | Refills: 0 | Status: SHIPPED | OUTPATIENT
Start: 2023-02-14 | End: 2023-03-01

## 2023-02-14 RX ORDER — PANTOPRAZOLE SODIUM 40 MG/10ML
40 INJECTION, POWDER, LYOPHILIZED, FOR SOLUTION INTRAVENOUS EVERY 12 HOURS SCHEDULED
Status: DISCONTINUED | OUTPATIENT
Start: 2023-02-14 | End: 2023-02-14 | Stop reason: HOSPADM

## 2023-02-14 RX ORDER — ALBUTEROL SULFATE 90 UG/1
AEROSOL, METERED RESPIRATORY (INHALATION)
Qty: 54 G | Refills: 0 | Status: SHIPPED | OUTPATIENT
Start: 2023-02-14

## 2023-02-14 RX ORDER — AMOXICILLIN 250 MG
1 CAPSULE ORAL
Status: DISCONTINUED | OUTPATIENT
Start: 2023-02-14 | End: 2023-02-14 | Stop reason: HOSPADM

## 2023-02-14 RX ORDER — ONDANSETRON 4 MG/1
TABLET, ORALLY DISINTEGRATING ORAL
Qty: 30 TABLET | Refills: 0 | Status: SHIPPED | OUTPATIENT
Start: 2023-02-14

## 2023-02-14 RX ORDER — FAMOTIDINE 20 MG/1
20 TABLET, FILM COATED ORAL DAILY
Status: DISCONTINUED | OUTPATIENT
Start: 2023-02-14 | End: 2023-02-14 | Stop reason: HOSPADM

## 2023-02-14 RX ORDER — AMLODIPINE BESYLATE 5 MG/1
TABLET ORAL
Qty: 30 TABLET | Refills: 0 | Status: SHIPPED | OUTPATIENT
Start: 2023-02-14

## 2023-02-14 RX ADMIN — HEPARIN SODIUM 5000 UNITS: 5000 INJECTION INTRAVENOUS; SUBCUTANEOUS at 06:42

## 2023-02-14 RX ADMIN — LISINOPRIL 20 MG: 20 TABLET ORAL at 07:59

## 2023-02-14 RX ADMIN — HYDROMORPHONE HYDROCHLORIDE 0.5 MG: 1 INJECTION, SOLUTION INTRAMUSCULAR; INTRAVENOUS; SUBCUTANEOUS at 12:54

## 2023-02-14 RX ADMIN — PANTOPRAZOLE SODIUM 40 MG: 40 INJECTION, POWDER, FOR SOLUTION INTRAVENOUS at 12:54

## 2023-02-14 RX ADMIN — AMLODIPINE BESYLATE 5 MG: 5 TABLET ORAL at 07:59

## 2023-02-14 RX ADMIN — OXYCODONE HYDROCHLORIDE 10 MG: 10 TABLET ORAL at 02:46

## 2023-02-14 RX ADMIN — HEPARIN SODIUM 5000 UNITS: 5000 INJECTION INTRAVENOUS; SUBCUTANEOUS at 12:59

## 2023-02-14 RX ADMIN — SENNOSIDES AND DOCUSATE SODIUM 1 TABLET: 50; 8.6 TABLET ORAL at 12:54

## 2023-02-14 RX ADMIN — OXYCODONE HYDROCHLORIDE 10 MG: 10 TABLET ORAL at 07:59

## 2023-02-14 NOTE — PROGRESS NOTES
[facility]  Progress Note - Vernell Valencia 1975, 50 y o  male MRN: [id number]  Unit/Bed#: -01 Encounter: [id number]  Primary Care Provider: LUANA Lanier   Date and time admitted to hospital: 2/6/2023  8:23 PM    * Constipation  Assessment & Plan  Presented with significant constipation  Reports no bowel movements for 6 days  CT on admission showed dilation of colon with stool burden high risk for perforation    Repeat CT showed numerous hematomas as well as slightly improvement of the colonic distention  Surgery primary team  Currently advance to surgical soft  Defer treatment plan to surgery team    Severe protein-calorie malnutrition ([facility] [address] )  Assessment & Plan  Malnutrition Findings:   Adult Malnutrition type: Acute illness  Adult Degree of Malnutrition: Other severe protein calorie malnutrition  Malnutrition Characteristics: Inadequate energy, Weight loss                  360 Statement: related to inadequate energy intake as evidenced by consuiming < 50% of energy intake compared to estimated needs for > 5 days and 10 7% (22#) weight loss in 3 months  Treated with full liquid diet  BMI Findings: Body mass index is 25 58 kg/m²  Hypertension  Assessment & Plan  Blood pressure controlled  Currently 124/88, continue p o  BP meds    GERD (gastroesophageal reflux disease)  Assessment & Plan  Will change to p o  Pepcid, IV needs to be reinserted        VTE Pharmacologic Prophylaxis:   Moderate Risk (Score 3-4) - Pharmacological DVT Prophylaxis Ordered: heparin  Patient Centered Rounds: I performed bedside rounds with nursing staff today  Discussions with Specialists or Other Care Team Provider: Mauri ORTEGA    Education and Discussions with Family / Patient: Patient deferred provider discussion, patient will update spouse       Total Time Spent on Date of Encounter in care of patient: 35 minutes This time was spent on one or more of the following: performing physical exam; counseling and coordination of care; obtaining or reviewing history; documenting in the medical record; reviewing/ordering tests, medications or procedures; communicating with other healthcare professionals and discussing with patient's family/caregivers  Current Length of Stay: 6 day(s)  Current Patient Status: Inpatient   Certification Statement: Maria Del Carmen Marrero is a consultation, surgery is primary  Discharge Plan: [facility] is following this patient on consult  They are medically stable for discharge when deemed appropriate by primary service  Code Status: Level 1 - Full Code    Subjective:   Patient reports having continued abdominal pain and continuation of not passing gas and not having a bowel movement  He denies nausea, shortness of breath, chest pain/pressure, palpitations, or chills  Objective:     Vitals:   Temp (24hrs), Av 1 °F (36 7 °C), Min:97 8 °F (36 6 °C), Max:98 2 °F (36 8 °C)    Temp:  [97 8 °F (36 6 °C)-98 2 °F (36 8 °C)] 97 8 °F (36 6 °C)  HR:  [74-80] 80  Resp:  [16-19] 16  BP: (124-158)/() 124/88  SpO2:  [95 %-96 %] 95 %  Body mass index is 25 58 kg/m²  Input and Output Summary (last 24 hours): Intake/Output Summary (Last 24 hours) at 2023 1257  Last data filed at 2023 0300  Gross per 24 hour   Intake 480 ml   Output --   Net 480 ml       Physical Exam:   Physical Exam  Vitals and nursing note reviewed  Constitutional:       Appearance: He is normal weight  HENT:      Head: Normocephalic  Nose: Nose normal       Mouth/Throat:      Mouth: Mucous membranes are moist       Pharynx: Oropharynx is clear  Eyes:      General: No scleral icterus  Conjunctiva/sclera: Conjunctivae normal       Pupils: Pupils are equal, round, and reactive to light  Cardiovascular:      Rate and Rhythm: Normal rate and regular rhythm  Heart sounds: No murmur heard  No friction rub  No gallop     Pulmonary:      Effort: Pulmonary effort is normal  No respiratory distress  Breath sounds: Normal breath sounds  No stridor  No wheezing, rhonchi or rales  Abdominal:      General: Abdomen is flat  There is distension  Palpations: Abdomen is soft  There is no mass  Tenderness: There is abdominal tenderness ( Right upper and lower quadrants)  There is no guarding or rebound  Hernia: No hernia is present  Musculoskeletal:         General: Normal range of motion  Cervical back: Normal range of motion and neck supple  Right lower leg: No edema  Left lower leg: No edema  Lymphadenopathy:      Cervical: No cervical adenopathy  Skin:     General: Skin is warm  Coloration: Skin is not jaundiced or pale  Findings: No bruising, erythema or lesion  Neurological:      General: No focal deficit present  Mental Status: He is alert and oriented to person, place, and time  Mental status is at baseline  Cranial Nerves: No cranial nerve deficit  Motor: No weakness  Psychiatric:         Mood and Affect: Mood normal          Behavior: Behavior normal          Thought Content:  Thought content normal           Additional Data:     Labs:  Results from last 7 days   Lab Units 02/14/23  0440 02/13/23  0506   WBC Thousand/uL 5 75 5 56   HEMOGLOBIN g/dL 12 8 12 6   HEMATOCRIT % 39 3 37 7   PLATELETS Thousands/uL 143* 147*   NEUTROS PCT %  --  48   LYMPHS PCT %  --  32   MONOS PCT %  --  14*   EOS PCT %  --  4     Results from last 7 days   Lab Units 02/14/23  0440   SODIUM mmol/L 138   POTASSIUM mmol/L 4 0   CHLORIDE mmol/L 102   CO2 mmol/L 27   BUN mg/dL 6   CREATININE mg/dL 0 56*   ANION GAP mmol/L 9   CALCIUM mg/dL 9 5   GLUCOSE RANDOM mg/dL 122         Results from last 7 days   Lab Units 02/10/23  2010   POC GLUCOSE mg/dl 107               Lines/Drains:  Invasive Devices     Peripheral Intravenous Line  Duration           Peripheral IV 02/14/23 Right;Ventral (anterior) Forearm <1 day                      Imaging: Reviewed radiology reports from this admission including: chest xray and abdominal/pelvic CT    Recent Cultures (last 7 days):   Results from last 7 days   Lab Units 02/08/23  0857   C DIFF TOXIN B BY PCR  Negative       Last 24 Hours Medication List:   Current Facility-Administered Medications   Medication Dose Route Frequency Provider Last Rate   • acetaminophen  650 mg Oral Q6H PRN Carlos Crawford PA-C     • amLODIPine  5 mg Oral Daily Eleazar Vieira PA-C     • famotidine  20 mg Oral Daily Eleazar Vieira PA-C     • heparin (porcine)  5,000 Units Subcutaneous [facility] Gisela Fernando MD     • HYDROmorphone  0 5 mg Intravenous Q4H PRN Carlos Crawford PA-C     • lisinopril  20 mg Oral Daily Eleazar Vieira PA-C     • ondansetron  4 mg Intravenous Q6H PRN Gisela Fernando MD     • oxyCODONE  10 mg Oral Q4H PRN Carlos Crawford PA-C     • oxyCODONE  5 mg Oral Q4H PRN Carlos Crawford PA-C     • pancrelipase (Lip-Prot-Amyl)  24,000 Units Oral TID With Meals Blair Freed PA-C     • pantoprazole  40 mg Intravenous Q12H [address] Katelyn Freed PA-C     • senna-docusate sodium  1 tablet Oral HS Katelyn Jackman PA-C          Today, Patient Was Seen By: Eleazar Vieira PA-C    **Please Note: This note may have been constructed using a voice recognition system  **

## 2023-02-14 NOTE — PLAN OF CARE
Problem: PAIN - ADULT  Goal: Verbalizes/displays adequate comfort level or baseline comfort level  Description: Interventions:  - Encourage patient to monitor pain and request assistance  - Assess pain using appropriate pain scale  - Administer analgesics based on type and severity of pain and evaluate response  - Implement non-pharmacological measures as appropriate and evaluate response  - Consider cultural and social influences on pain and pain management  - Notify physician/advanced practitioner if interventions unsuccessful or patient reports new pain  Outcome: Progressing     Problem: INFECTION - ADULT  Goal: Absence or prevention of progression during hospitalization  Description: INTERVENTIONS:  - Assess and monitor for signs and symptoms of infection  - Monitor lab/diagnostic results  - Monitor all insertion sites, i e  indwelling lines, tubes, and drains  - Monitor endotracheal if appropriate and nasal secretions for changes in amount and color  - [place] appropriate cooling/warming therapies per order  - Administer medications as ordered  - Instruct and encourage patient and family to use good hand hygiene technique  - Identify and instruct in appropriate isolation precautions for identified infection/condition  Outcome: Progressing  Goal: Absence of fever/infection during neutropenic period  Description: INTERVENTIONS:  - Monitor WBC    Outcome: Progressing     Problem: SAFETY ADULT  Goal: Patient will remain free of falls  Description: INTERVENTIONS:  - Educate patient/family on patient safety including physical limitations  - Instruct patient to call for assistance with activity   - Consult OT/PT to assist with strengthening/mobility   - Keep Call bell within reach  - Keep bed low and locked with side rails adjusted as appropriate  - Keep care items and personal belongings within reach  - Initiate and maintain comfort rounds  - Make Fall Risk Sign visible to staff  - Offer Toileting every 2 Hours, in advance of need  - Initiate/Maintain alarm  - Obtain necessary fall risk management equipment:   - Apply yellow socks and bracelet for high fall risk patients  - Consider moving patient to room near nurses station  Outcome: Progressing  Goal: Maintain or return to baseline ADL function  Description: INTERVENTIONS:  -  Assess patient's ability to carry out ADLs; assess patient's baseline for ADL function and identify physical deficits which impact ability to perform ADLs (bathing, care of mouth/teeth, toileting, grooming, dressing, etc )  - Assess/evaluate cause of self-care deficits   - Assess range of motion  - Assess patient's mobility; develop plan if impaired  - Assess patient's need for assistive devices and provide as appropriate  - Encourage maximum independence but intervene and supervise when necessary  - Involve family in performance of ADLs  - Assess for home care needs following discharge   - Consider OT consult to assist with ADL evaluation and planning for discharge  - Provide patient education as appropriate  Outcome: Progressing  Goal: Maintains/Returns to pre admission functional level  Description: INTERVENTIONS:  - Perform BMAT or MOVE assessment daily    - Set and communicate daily mobility goal to care team and patient/family/caregiver  - Collaborate with rehabilitation services on mobility goals if consulted  - Perform Range of Motion 3 times a day  - Reposition patient every 2 hours    - Dangle patient 3 times a day  - Stand patient 3 times a day  - Ambulate patient 3 times a day  - Out of bed to chair 3 times a day   - Out of bed for meals 3 times a day  - Out of bed for toileting  - Record patient progress and toleration of activity level   Outcome: Progressing     Problem: DISCHARGE PLANNING  Goal: Discharge to home or other facility with appropriate resources  Description: INTERVENTIONS:  - Identify barriers to discharge w/patient and caregiver  - Arrange for needed discharge resources and transportation as appropriate  - Identify discharge learning needs (meds, wound care, etc )  - Arrange for interpretive services to assist at discharge as needed  - Refer to Case Management Department for coordinating discharge planning if the patient needs post-hospital services based on physician/advanced practitioner order or complex needs related to functional status, cognitive ability, or social support system  Outcome: Progressing     Problem: Knowledge Deficit  Goal: Patient/family/caregiver demonstrates understanding of disease process, treatment plan, medications, and discharge instructions  Description: Complete learning assessment and assess knowledge base    Interventions:  - Provide teaching at level of understanding  - Provide teaching via preferred learning methods  Outcome: Progressing     Problem: GASTROINTESTINAL - ADULT  Goal: Minimal or absence of nausea and/or vomiting  Description: INTERVENTIONS:  - Administer IV fluids if ordered to ensure adequate hydration  - Maintain NPO status until nausea and vomiting are resolved  - Nasogastric tube if ordered  - Administer ordered antiemetic medications as needed  - Provide nonpharmacologic comfort measures as appropriate  - Advance diet as tolerated, if ordered  - Consider nutrition services referral to assist patient with adequate nutrition and appropriate food choices  Outcome: Progressing  Goal: Maintains or returns to baseline bowel function  Description: INTERVENTIONS:  - Assess bowel function  - Encourage oral fluids to ensure adequate hydration  - Administer IV fluids if ordered to ensure adequate hydration  - Administer ordered medications as needed  - Encourage mobilization and activity  - Consider nutritional services referral to assist patient with adequate nutrition and appropriate food choices  Outcome: Progressing  Goal: Maintains adequate nutritional intake  Description: INTERVENTIONS:  - Monitor percentage of each meal consumed  - Identify factors contributing to decreased intake, treat as appropriate  - Assist with meals as needed  - Monitor I&O, weight, and lab values if indicated  - Obtain nutrition services referral as needed  Outcome: Progressing  Goal: Oral mucous membranes remain intact  Description: INTERVENTIONS  - Assess oral mucosa and hygiene practices  - Implement preventative oral hygiene regimen  - Implement oral medicated treatments as ordered  - Initiate Nutrition services referral as needed  Outcome: Progressing     Problem: Nutrition/Hydration-ADULT  Goal: Nutrient/Hydration intake appropriate for improving, restoring or maintaining nutritional needs  Description: Monitor and assess patient's nutrition/hydration status for malnutrition  Collaborate with interdisciplinary team and initiate plan and interventions as ordered  Monitor patient's weight and dietary intake as ordered or per policy  Utilize nutrition screening tool and intervene as necessary  Determine patient's food preferences and provide high-protein, high-caloric foods as appropriate       INTERVENTIONS:  - Monitor oral intake, urinary output, labs, and treatment plans  - Assess nutrition and hydration status and recommend course of action  - Evaluate amount of meals eaten  - Assist patient with eating if necessary   - Allow adequate time for meals  - Recommend/ encourage appropriate diets, oral nutritional supplements, and vitamin/mineral supplements  - Order, calculate, and assess calorie counts as needed  - Recommend, monitor, and adjust tube feedings and TPN/PPN based on assessed needs  - Assess need for intravenous fluids  - Provide specific nutrition/hydration education as appropriate  - Include patient/family/caregiver in decisions related to nutrition  Outcome: Progressing

## 2023-02-14 NOTE — PROGRESS NOTES
Progress Note -Surgery BROOKLYNN Oliveira 50 y o  male MRN: [id number]  Unit/Bed#: -Rosie Encounter: [id number]      Assessment   51y M w Colonic ileus with severe cecal dilation requiring colonoscopic decompression and placement of decompression tube  - decompression tube no longer in place since 2/10/23  - CT AP w oral contrast 2/12/23:  No discernible evidence for small or large bowel obstruction   Colonic distention overall has slightly improved with mild dilatation of the transverse colon persisting   Moderate stool in the right colon  New 2 cm probable hematoma in the omental fat adjacent to the inferior right hepatic lobe   New small quantity of pelvic free fluid with attenuation higher than simple fluid, also likely reflecting hemorrhage  New 3 9 x 1 2 x 2 4 cm hematoma in the space of Retzius to the left of midline   - Hgb stable, 12 8, VSS afebrile, no leukocytosis    Plan   -- advance diet and monitor tolerationg  -- continue with serial abdominal exams and monitor labs  -- Monitor bowel function  -- encourage ambulation  -- analgesia and antiemetics prn  -- DVT prophylaxis  -- Tentative discharge in the next 24h if continues to do well  ______________________________________________________________________  Subjective:   Patient still with early satiety and bloating with oral intake  No nausea or vomiting and tolerating diet  Passed flatus this morning, not yesterday  No BM  Still having intermittent RLQ pain    Objective:    Vitals:  /88 (BP Location: Left arm)   Pulse 80   Temp 97 8 °F (36 6 °C) (Oral)   Resp 16   Ht 5' 11" (1 803 m)   Wt 83 2 kg (183 lb 6 8 oz)   SpO2 95%   BMI 25 58 kg/m²     I/Os:  I/O last 3 completed shifts: In: 2932 [P O :3680; I V :1000]  Out: 50 [Urine:50]    No intake/output data recorded      Invasive Devices     None                 Medications:  Current Facility-Administered Medications   Medication Dose Route Frequency   • acetaminophen (TYLENOL) tablet 650 mg  650 mg Oral Q6H PRN   • amLODIPine (NORVASC) tablet 5 mg  5 mg Oral Daily   • Famotidine (PF) (PEPCID) injection 20 mg  20 mg Intravenous Q12H [address]   • heparin (porcine) subcutaneous injection 5,000 Units  5,000 Units Subcutaneous Q8H [address]   • HYDROmorphone (DILAUDID) injection 0 5 mg  0 5 mg Intravenous Q4H PRN   • lisinopril (ZESTRIL) tablet 20 mg  20 mg Oral Daily   • ondansetron (ZOFRAN) injection 4 mg  4 mg Intravenous Q6H PRN   • oxyCODONE (ROXICODONE) immediate release tablet 10 mg  10 mg Oral Q4H PRN   • oxyCODONE (ROXICODONE) IR tablet 5 mg  5 mg Oral Q4H PRN       Lab Results and Cultures:   CBC with diff:   Lab Results   Component Value Date    WBC 5 75 02/14/2023    HGB 12 8 02/14/2023    HCT 39 3 02/14/2023     (H) 02/14/2023     (L) 02/14/2023    MCH 33 7 02/14/2023    MCHC 32 6 02/14/2023    RDW 12 1 02/14/2023    MPV 10 1 02/14/2023    NRBC 0 02/13/2023       BMP/CMP:  Lab Results   Component Value Date     04/26/2017    K 4 0 02/14/2023    K 4 2 01/23/2023     02/14/2023    CL 98 01/23/2023    CO2 27 02/14/2023    CO2 29 01/23/2023    BUN 6 02/14/2023    BUN 14 01/23/2023    CREATININE 0 56 (L) 02/14/2023    CREATININE 0 80 04/26/2017    CALCIUM 9 5 02/14/2023    CALCIUM 9 5 01/23/2023    AST 66 (H) 02/07/2023    AST 59 (H) 01/23/2023    ALT 73 02/07/2023    ALT 48 (H) 01/23/2023    ALKPHOS 148 (H) 02/07/2023    ALKPHOS 138 (H) 01/23/2023    PROT 7 3 04/26/2017    BILITOT 1 8 (H) 04/26/2017    EGFR 121 02/14/2023     Coags:   Lab Results   Component Value Date    PTT 36 05/14/2020    INR 1 1 01/23/2023    INR 1 27 (H) 05/14/2020        Urinalysis:   Lab Results   Component Value Date    COLORU Yellow 02/06/2023    COLORU YELLOW 09/15/2016    CLARITYU Clear 02/06/2023    SPECGRAV >=1 050 (H) 02/06/2023    SPECGRAV 1 007 09/15/2016    PHUR 7 0 02/06/2023    PHUR 6 5 09/11/2018    PHUR 7 0 09/15/2016    LEUKOCYTESUR Negative 02/06/2023    LEUKOCYTESUR NEGATIVE 09/15/2016 NITRITE Negative 02/06/2023    NITRITE NEGATIVE 09/15/2016    PROTEINUA NEGATIVE 09/15/2016    GLUCOSEU 1000 (1%) (A) 02/06/2023    KETONESU Trace (A) 02/06/2023    KETONESU NEGATIVE 09/15/2016    BILIRUBINUR Negative 02/06/2023    BILIRUBINUR NEGATIVE 09/15/2016    BLOODU Negative 02/06/2023          Physical Exam:  General Appearance:    Alert and orientated x 3, cooperative, no distress, appears stated age   Lungs:     Clear to auscultation bilaterally, respirations unlabored, no wheezes    Heart:    Regular rate and rhythm, S1 and S2 normal, no murmur   Abdomen:    Normoactive BS, soft, RLQ tenderness, non rigid, no masses, no palpated organomegaly, surgical incisions healing well   Extremities:  Extremities normal, no calf tenderness, no cyanosis or edema   Pulses:   2+ and symmetric all extremities   Skin:   Skin color, texture, turgor normal, no rashes   Neurologic:   CNII-XII intact, normal strength, affect appropriate       Imaging:  Colonoscopy    Result Date: 2/8/2023  Impression: 1  Small bowel and colonic ileus, status post decompression and placement of a colonic decompression tube RECOMMENDATION: 1  Do not place this decompression tube on intermittent suction 2  Manual suction will be performed in the event that the patient is redeveloping colonic ileus 3  Decompression tube has been taped to the leg  Alfred Solis, DO Lazaro Jeffries, FACP    XR abdomen 1 view kub    Result Date: 2/11/2023  Impression: Small and large bowel distention similar to the prior study, may represent ileus  Workstation performed: [id number]     XR abdomen 1 view kub    Result Date: 2/10/2023  Impression: Moderate gaseous distention of transverse colon, estimated at up to 8 cm in caliber, unchanged from examination of one day earlier  Mild gaseous distention of small bowel loops   Workstation performed: [id number]     XR abdomen 1 view kub    Result Date: 2/10/2023  Impression: Moderate gaseous distention of transverse colon, estimated at up to 8 cm in caliber, slightly less severe as compared with radiograph performed with the decompression tube in place on February 9, 2023  Workstation performed: [id number]     XR abdomen 1 view kub    Result Date: 2/9/2023  Impression: Status post insertion of the colonic decompression tube, with tip in the transverse colon  Diffuse large bowel distention again seen consistent with ileus, improved from priors  Workstation performed: [id number]     XR abdomen 1 view kub    Result Date: 2/8/2023  Impression: Dilatation of the ascending and transverse colon with a maximum diameter of the ascending colon of up to 8 cm  Close follow-up recommended  The study was marked in EPIC for significant notification  Workstation performed: [id number]     XR abdomen 1 view kub    Result Date: 2/8/2023  Impression: Nonobstructive bowel gas pattern with gas-filled the small bowel loops and colonic loops compatible with ileus Gas-filled cecum noted with the diameter measuring 9 5 cm Workstation performed: [id number]     FL < 1 hour    Result Date: 2/9/2023  Impression: Fluoroscopic guidance provided for procedure guidance  Please refer to the separate procedure notes for additional details  Workstation performed: [id number]     CT abdomen pelvis with contrast    Result Date: 2/6/2023  Impression: Liquid stool within the colon with marked distention of the colon  Ascending colon measuring up to 7 cm  Findings may represent colitis  Given the size of the colon patient is at a high risk for perforation  Pneumoperitoneum which may be due to prior surgery however cannot rule out postsurgical complication or other etiologies  Cirrhotic liver with signs of portal hypertension  Thickening of the urinary bladder wall with surrounding inflammatory changes likely representing cystitis  Follow-up with urology is recommended    I personally discussed this study with Krista Sierra on 2/6/2023 at 10:46 PM  Workstation performed: [id number]       VTE Pharmacologic Prophylaxis: Heparin  VTE Mechanical Prophylaxis: sequential compression device    Geraldine Higgins PA-C   2/14/2023

## 2023-02-14 NOTE — ASSESSMENT & PLAN NOTE
Malnutrition Findings:   Adult Malnutrition type: Acute illness  Adult Degree of Malnutrition: Other severe protein calorie malnutrition  Malnutrition Characteristics: Inadequate energy, Weight loss                  360 Statement: related to inadequate energy intake as evidenced by consuiming < 50% of energy intake compared to estimated needs for > 5 days and 10 7% (22#) weight loss in 3 months  Treated with full liquid diet  BMI Findings: Body mass index is 25 58 kg/m²

## 2023-02-14 NOTE — DISCHARGE SUMMARY
Discharge Summary - Moni Hopper 50 y o  male MRN: [id number]    Unit/Bed#: -01 Encounter: [id number]    Admission Date:   Admission Orders (From admission, onward)     Ordered        02/08/23 1426  Inpatient Admission  Once            02/06/23 2328  Place in Observation  Once                        Admitting Diagnosis: Dilatation of colon [K59 39]  Primary hypertension [I10]  Constipation [K59 00]  Post-operative complication [H19  9XXA]     HPI: Moni Hopper is a 50 y o  male with a past medical history of asthma, GERD, hyperlipidemia, hypertension who presents with increasing abdominal pain and distention over the last 4 days  The patient had an elective laparoscopic bilateral inguinal hernia repair on 2/3  He reports no bowel movement since 2/2  Denies passing gas since Thursday as well  He reports being able to eat normally Friday night Saturday and Sunday however yesterday he was unable to eat a normal diet because of the abdominal distention and discomfort  He states it is difficult to get in and out of bed due to the abdominal distention and lower abdominal pain  He denies any intestinal issues in the past however he has seen GI for other issues and currently follows with them  No history of ileus  The patient denies CP, SOB, palpitations, headache, fever, chills, unintentional weight loss, night sweats, nausea, vomiting, constipation, diarrhea  (HPI obtained from H&P written by Samantha Bell PA-C, on 2/7/2023)       Procedures Performed: No orders of the defined types were placed in this encounter  Summary of Hospital Course: Patient underwent laparoscopic bilateral inguinal hernia repair on 2/3/2023  Patient then presented to the emergency department with complaints of abdominal pain and abdominal distention on 2/6/2023, stated that he had not had a bowel movement since 2/2/2023    He underwent CT of the abdomen pelvis with marked distention of the colon descending colon measuring up to 7 cm  Patient underwent rectal tube placement with little relief of symptoms, KUB on 2/8/2023 showed dilated cecum measuring 9 5 cm in diameter  Decision was made to have patient undergo colonoscopy on 2/8/2023 and a decompression tube was placed  Region over the next few days the patient continued to have persistent dilation of the colon on imaging, clinically had improved with less pain and abdominal distention  It was reported the patient's decompression tube had accidentally fallen out s/p bowel movement  2/10/2023 patient experienced some abdominal pain and increased distention with regular diet continues to have small amounts of flatus  Given the patient's slow motility and abdominal distention the patient was given 1 dose of neostigmine back down to clear liquid diet  The patient's consistent abdominal distention and slow transit repeat CT was ordered on 2/12/2023 showed colonic distention overall had slightly improved mild dilation of the transverse colon and moderate amount of stool in the right colon  Given these findings and patient's physical exam patient was advanced to full liquid diet was advanced as tolerated  Patient continues to have small amounts of flatus and small amounts of stool while tolerating diet advancement  He was discharged on 2/14/2023 which time he denied any abdominal pain but did continue to have slight nominal distention decreased appetite  He was instructed to follow-up with general surgeon on 2/21/2023, PCP, and gastroenterology  Time of discharge the patient with a prescription for Colace for 15 days, no pain medications were sent  All of the patient's questions and concerns were answered and addressed the patient's satisfaction  Discharge instructions were provided to the patient and verbal and written form  Physical exam was performed on day of discharge    Please see progress note from 2/14/2023 written by Mehdi Grady PA-C  Significant Findings, Care, Treatment and Services Provided: Postop colonic ileus with severe cecal dilation    Complications: None    Discharge Diagnosis: Postop colonic ileus    Medical Problems     Resolved Problems  Date Reviewed: 2/7/2023   None         Condition at Discharge: fair         Discharge instructions/Information to patient and family:   See after visit summary for information provided to patient and family  Provisions for Follow-Up Care:  See after visit summary for information related to follow-up care and any pertinent home health orders  PCP: LUANA Queen    Disposition: Home    Planned Readmission: No      Discharge Statement   I spent 30 minutes discharging the patient  This time was spent on the day of discharge  I had direct contact with the patient on the day of discharge  Additional documentation is required if more than 30 minutes were spent on discharge  Discharge Medications:  See after visit summary for reconciled discharge medications provided to patient and family

## 2023-02-14 NOTE — ASSESSMENT & PLAN NOTE
Presented with significant constipation  Reports no bowel movements for 6 days  CT on admission showed dilation of colon with stool burden high risk for perforation    Repeat CT showed numerous hematomas as well as slightly improvement of the colonic distention  Surgery primary team  Currently advance to surgical soft  Defer treatment plan to surgery team

## 2023-02-16 ENCOUNTER — TRANSITIONAL CARE MANAGEMENT (OUTPATIENT)
Dept: FAMILY MEDICINE CLINIC | Facility: CLINIC | Age: 48
End: 2023-02-16

## 2023-02-16 ENCOUNTER — EVALUATION (OUTPATIENT)
Dept: PHYSICAL THERAPY | Facility: CLINIC | Age: 48
End: 2023-02-16

## 2023-02-16 DIAGNOSIS — M75.42 SUBACROMIAL IMPINGEMENT OF LEFT SHOULDER: ICD-10-CM

## 2023-02-16 DIAGNOSIS — S46.212D BICEPS STRAIN, LEFT, SUBSEQUENT ENCOUNTER: ICD-10-CM

## 2023-02-16 DIAGNOSIS — S46.012D ROTATOR CUFF STRAIN, LEFT, SUBSEQUENT ENCOUNTER: Primary | ICD-10-CM

## 2023-02-16 DIAGNOSIS — S29.011D STRAIN OF LEFT PECTORALIS MUSCLE, SUBSEQUENT ENCOUNTER: ICD-10-CM

## 2023-02-16 NOTE — UTILIZATION REVIEW
NOTIFICATION OF ADMISSION DISCHARGE   This is a Notification of Discharge from [facility]  Please be advised that this patient has been discharge from our facility  Below you will find the admission and discharge date and time including the patient’s disposition  UTILIZATION REVIEW CONTACT:  Rosemarie Valladares  Utilization   Network Utilization Review Department  Phone: [phone] x carefully listen to the prompts  All voicemails are confidential   Email: [email or web address]     ADMISSION INFORMATION  PRESENTATION DATE: 2/6/2023  8:23 PM  OBERVATION ADMISSION DATE: 02/06/23  INPATIENT ADMISSION DATE: 2/8/23  2:27 PM   DISCHARGE DATE: 2/14/2023  3:16 PM   DISPOSITION:Home/Self Care    IMPORTANT INFORMATION:  Send all requests for admission clinical reviews, approved or denied determinations and any other requests to dedicated fax number below belonging to the campus where the patient is receiving treatment   List of dedicated fax numbers:  [address] DENIALS (Administrative/Medical Necessity) [phone]   [address] (Maternity/NICU/Pediatrics) [phone]   [facility] [phone]   [facility] 87 [phone]   [address] [phone]   [address] [facility] [phone]   90 [facility] [phone]   [address] [place] [address] 119 [phone]   [facility]  [phone] [address] [facility] [phone] [address] [facility] [address] [facility] [phone]

## 2023-02-16 NOTE — PROGRESS NOTES
PT Evaluation      Today's date: 2023  Patient name: Otf Christopher  : 1975  MRN: [id number]  Referring provider: Warner Maxwell  Dx:   Encounter Diagnosis     ICD-10-CM    1  Rotator cuff strain, left, subsequent encounter  S46 012D Ambulatory Referral to Physical Therapy      2  Biceps strain, left, subsequent encounter  S46 212D Ambulatory Referral to Physical Therapy      3  Subacromial impingement of left shoulder  M75 42 Ambulatory Referral to Physical Therapy      4  Strain of left pectoralis muscle, subsequent encounter  S29 011D Ambulatory Referral to Physical Therapy                     Assessment  Assessment details: Patient was provided a home exercise program and demonstrated an understanding of exercises  Patient was advised to stop performing home exercise program if symptoms increase or new complaints developed  Verbal understanding demonstrated regarding home exercise program instructions  Patient would benefit from skilled physical therapy services for prescribed exercises, manual interventions, neuromuscular re-education, education, and modalities as deemed appropriate to assist patient in achieving their maximum level of function  Patient returns to PT today to address is left pectoralis/ shoulder pain having attended 2 prior sessions with little change  He notes that he did receive an injection since his last treatment in 2022 and since has had significantly less pain   Evaluation today reveals:  Mild end range movement loss left shoulder, scapular weakness, left shoulder/rtc weakness, decreased postural awareness/ self correction, (+) tenderness left pectoralis, left bicep / sst tenderness  He appears motivated to reduce his pain/ improve his strength and functional movements     Impairments: abnormal or restricted ROM, activity intolerance, impaired physical strength, lacks appropriate home exercise program, pain with function and poor posture Understanding of Dx/Px/POC: good  Goals  STG   1  Patient will demonstrate independence and competence with HEP 2 -4 weeks  2  Patient will report > 25-50% reduced pain 2-4 weeks    LTG   1  Patient will report improvements with both functional and recreational abilities  4-6 weeks  2  Patient will demonstrate improved motor function  4-6 weeks  3,  Improved postural awareness and self correction  4-8 weeks  4  Reduced tenderness left pectoralis/ biceps/ sst   4-8 weeks  Plan  Plan details: Patient response to treatment will be monitored each session and progressed accordingly    Thank you for this referral    Patient would benefit from: skilled physical therapy  Planned modality interventions: cryotherapy and thermotherapy: hydrocollator packs  Other planned modality interventions: IASTM, Kinesiotape  Planned therapy interventions: IADL retraining, joint mobilization, manual therapy, patient education, postural training, strengthening, stretching, therapeutic exercise, flexibility, home exercise program and neuromuscular re-education  Frequency: 2x week  Duration in weeks: 8  Treatment plan discussed with: patient        Subjective Evaluation    History of Present Illness  Mechanism of injury: Working for [email or web address] - trying to move a pallet and felt left shoulder/ pect pain  Couple days later - sought medical attention   Attended PT x 2 with little change  Injected last visit with Dr Misty Cook and has felt great since  Unable to lie on left side       Pain  Current pain ratin  At worst pain ratin  Quality: sharp  Relieving factors: ice  Progression: improved    Social Support  Lives with: spouse    Employment status: working ([address] since injury)  Hand dominance: right    Patient Goals  Patient goals for therapy: increased strength          Objective     Postural Observations  Seated posture: fair  Standing posture: fair  Correction of posture: has no consistent effect    Additional Postural Observation Details  Patient tends to demonstrate slight fhp/ shoulders protracted/ fwd bilaterally   He has flattened t-spine  He can self correct with cueing     Palpation   Left   Tenderness of the anterior deltoid, biceps, pectoralis major and pectoralis minor  Tenderness     Left Shoulder   Tenderness in the acromion, biceps tendon (proximal), coracoid process and supraspinatus tendon  Cervical/Thoracic Screen   Cervical range of motion within normal limits    Neurological Testing     Sensation     Shoulder   Left Shoulder   Intact: light touch    Right Shoulder   Intact: light touch    Active Range of Motion   Left Shoulder   Flexion: 160 degrees with pain  Abduction: 165 degrees with pain  External rotation BTH: C3 with pain  Internal rotation BTB: L4 with pain    Right Shoulder   Normal active range of motion    Passive Range of Motion   Left Shoulder   Normal passive range of motion  Flexion: with pain  Adduction: with pain    Additional Passive Range of Motion Details  Tightness/ mild pain reported end range left shoulder     Strength/Myotome Testing     Left Shoulder     Planes of Motion   Flexion: 4+   Extension: 5   Abduction: 4+   Adduction: 5   External rotation at 0°: 4+   Internal rotation at 0°: 5     Isolated Muscles   Biceps: 4+   Pectoralis major: 4+   Pectoralis minor: 4+   Triceps: 4+     Right Shoulder   Normal muscle strength    Tests     Left Shoulder   Positive crossover and Yergason's  Negative empty can, Hawkin's, painful arc and Speed's  Precautions: Left RTC strain, small tear sst, pect strain     [facility]  Access FCTE: [email or web address]        Manuals 2/16            FOTO db                         Prom left shoulder                                        IASTM left pect/ biceps db                         Neuro Re-Ed             Back rolls/ scap retract 20x ea                                                                                          Ther Ex             UBE             Wall slides  10s x 10            pulleys 3 min 10s hold                         TB rows/ lpd, no monies gtb x 20 ea            BOR / bicep curls/ tricep kickbacks             Prone 45, 90 x 2             Supine AROM flex/ scap             S/L abd, er                                                                 Ther Activity                                       Gait Training                                       Modalities

## 2023-02-18 DIAGNOSIS — I10 ESSENTIAL HYPERTENSION: ICD-10-CM

## 2023-02-18 RX ORDER — LISINOPRIL 20 MG/1
TABLET ORAL
Qty: 30 TABLET | Refills: 0 | Status: SHIPPED | OUTPATIENT
Start: 2023-02-18

## 2023-02-21 ENCOUNTER — TELEPHONE (OUTPATIENT)
Dept: FAMILY MEDICINE CLINIC | Facility: CLINIC | Age: 48
End: 2023-02-21

## 2023-02-21 ENCOUNTER — OFFICE VISIT (OUTPATIENT)
Dept: OBGYN CLINIC | Facility: CLINIC | Age: 48
End: 2023-02-21

## 2023-02-21 VITALS
HEART RATE: 72 BPM | HEIGHT: 71 IN | BODY MASS INDEX: 25.9 KG/M2 | SYSTOLIC BLOOD PRESSURE: 115 MMHG | DIASTOLIC BLOOD PRESSURE: 80 MMHG | WEIGHT: 185 LBS

## 2023-02-21 DIAGNOSIS — S46.212D BICEPS STRAIN, LEFT, SUBSEQUENT ENCOUNTER: ICD-10-CM

## 2023-02-21 DIAGNOSIS — S93.491A SPRAIN OF ANTERIOR TALOFIBULAR LIGAMENT OF RIGHT ANKLE, INITIAL ENCOUNTER: ICD-10-CM

## 2023-02-21 DIAGNOSIS — M75.42 SUBACROMIAL IMPINGEMENT OF LEFT SHOULDER: ICD-10-CM

## 2023-02-21 DIAGNOSIS — S46.012D ROTATOR CUFF STRAIN, LEFT, SUBSEQUENT ENCOUNTER: Primary | ICD-10-CM

## 2023-02-21 DIAGNOSIS — S29.011D STRAIN OF LEFT PECTORALIS MUSCLE, SUBSEQUENT ENCOUNTER: ICD-10-CM

## 2023-02-21 NOTE — LETTER
February 21, 2023     Patient: Judy Navarro  YOB: 1975  Date of Visit: 2/21/2023      To Whom it May Concern:    Judy Navarro is under my professional care  [facility] [employer] was seen in my office on 2/21/2023  He is unable to return to work at this time  From sports medicine standpoint he may return to work full duty/without restriction as of 03/06/2023  If you have any questions or concerns, please don't hesitate to call           Sincerely,          Miley Cortes DO        CC: No Recipients

## 2023-02-21 NOTE — PROGRESS NOTES
Assessment/Plan:  Assessment/Plan   Diagnoses and all orders for this visit:    Rotator cuff strain, left, subsequent encounter  -     Ambulatory Referral to Physical Therapy; Future    Biceps strain, left, subsequent encounter  -     Ambulatory Referral to Physical Therapy; Future    Subacromial impingement of left shoulder  -     Ambulatory Referral to Physical Therapy; Future    Strain of left pectoralis muscle, subsequent encounter  -     Ambulatory Referral to Physical Therapy; Future            41-year-old right-hand-dominant male with onset of left chest wall and left shoulder pain from injury at work 9/9/2022  Discussed with patient physical exam, impression, and plan  Left shoulder has range of motion forward flexion 160 degrees, abduction 160 degrees, and internal rotation to lumbar spine  There is normal strength in rotator cuff  There is pain with supraspinatus loading  He has weakness with bicep supination 4+/5  He has normal sensation throughout both upper extremities  Clinical impression is that he is improving regard to rotator cuff strain and pectoralis strain  I recommend he attend a few more sessions of formal PT to help improve strength in the left arm  From sports medicine standpoint he may return to work full duty/without restriction as of 3/6/2023, pending release from general surgeon regards to his hernia repair  Advised that left shoulder symptoms can be reaggravated  He will follow up with me as needed in this regard  Subjective:   Patient ID: Campbell Dewitt is a 50 y o  male  Chief Complaint   Patient presents with   • Left Shoulder - Follow-up       41-year-old right-hand-dominant male following up for onset of left chest wall and left shoulder pain from injury at work 9/9/2022  He was last seen by me 5 weeks ago at which point MRI review was noted for small focal full-thickness tear supraspinatus and mild degenerative changes of the labrum    He underwent subacromial steroid injection and was advised to continue with formal therapy  Since last visit he has undergone bilateral inguinal hernia repair  PT was interrupted due to recovery from the surgery  He reports that following injection left shoulder pain significantly improved he also reports improved range of motion in the shoulder  He has been continuing with home exercises on regular basis  He reports having pain described as generalized to the shoulder and radiating to the left chest wall, achy and sore, mild in intensity, worse with prolonged repetitive activity, improved with resting  He has follow-up with general surgeon on 3/2/2023  Shoulder Pain  This is a new problem  The current episode started more than 1 month ago  The problem occurs intermittently  The problem has been gradually improving  Associated symptoms include arthralgias and weakness  Pertinent negatives include no joint swelling or numbness  Exacerbated by: Arm use  He has tried rest and position changes (Corticosteroid injection, physical therapy, home exercise) for the symptoms  The treatment provided significant relief  Review of Systems   Musculoskeletal: Positive for arthralgias  Negative for joint swelling  Neurological: Positive for weakness  Negative for numbness  Objective:  Vitals:    02/21/23 1017   BP: 115/80   Pulse: 72   Weight: 83 9 kg (185 lb)   Height: 5' 11" (1 803 m)     Left Elbow Exam     Muscle Strength   Left elbow normal strength: 4+/5 supination  Pronation:  5/5       Left Shoulder Exam     Range of Motion   Active abduction: 160   Forward flexion: 160   Internal rotation 0 degrees: Lumbar     Muscle Strength   Abduction: 5/5   Internal rotation: 5/5   External rotation: 5/5   Supraspinatus: 5/5             Physical Exam  Vitals and nursing note reviewed  Constitutional:       General: He is not in acute distress  Appearance: He is well-developed  He is not ill-appearing or diaphoretic  HENT:      Head: Normocephalic and atraumatic  Right Ear: External ear normal       Left Ear: External ear normal    Eyes:      Conjunctiva/sclera: Conjunctivae normal    Neck:      Trachea: No tracheal deviation  Cardiovascular:      Rate and Rhythm: Normal rate  Pulmonary:      Effort: Pulmonary effort is normal  No respiratory distress  Abdominal:      General: There is no distension  Skin:     General: Skin is warm and dry  Coloration: Skin is not jaundiced or pale  Neurological:      Mental Status: He is alert and oriented to person, place, and time  Psychiatric:         Mood and Affect: Mood normal          Behavior: Behavior normal          Thought Content:  Thought content normal          Judgment: Judgment normal

## 2023-02-22 ENCOUNTER — OFFICE VISIT (OUTPATIENT)
Dept: PHYSICAL THERAPY | Facility: CLINIC | Age: 48
End: 2023-02-22

## 2023-02-22 DIAGNOSIS — S46.212D BICEPS STRAIN, LEFT, SUBSEQUENT ENCOUNTER: Primary | ICD-10-CM

## 2023-02-22 DIAGNOSIS — M75.42 SUBACROMIAL IMPINGEMENT OF LEFT SHOULDER: ICD-10-CM

## 2023-02-22 DIAGNOSIS — S29.011D STRAIN OF LEFT PECTORALIS MUSCLE, SUBSEQUENT ENCOUNTER: ICD-10-CM

## 2023-02-22 DIAGNOSIS — S46.012D ROTATOR CUFF STRAIN, LEFT, SUBSEQUENT ENCOUNTER: ICD-10-CM

## 2023-02-22 NOTE — PROGRESS NOTES
Daily Note     Today's date: 2023  Patient name: Arlyn Moya  : 1975  MRN: [id number]  Referring provider: Gemini Meléndez  Dx:   Encounter Diagnosis     ICD-10-CM    1  Biceps strain, left, subsequent encounter  S46 212D       2  Subacromial impingement of left shoulder  M75 42       3  Strain of left pectoralis muscle, subsequent encounter  S29 011D       4  Rotator cuff strain, left, subsequent encounter  S46 012D                       Subjective: Upon presentation, SPR =0/10   "Since the injection (3 weeks prior) I really have little to no pain"  Objective: See treatment diary below      Assessment: Tolerated treatment and progression of current program without limitations in exercise performance or reported exacerbation of pain symptoms  Patient demonstrated PROM WFL in all planes of motion with slight restriction at end flexion and abduction ROM   Patient demonstrated fatigue post treatment, exhibited good technique with therapeutic exercises and would benefit from continued PT      Plan: Continue per plan of care  Progress treatment as tolerated  Precautions: Left RTC strain, small tear sst, pect strain     [facility]  Access Code: [email or web address]        Manuals            FOTO db                         Prom left shoulder   LA                                     IASTM left pect/ biceps db LA                        Neuro Re-Ed             Back rolls/ scap retract 20x ea 20x each                                                                                         Ther Ex             UBE  Alt  6'           Wall slides  10s x 10 :10  10x           pulleys 3 min 10s hold :10  x5'                        TB rows/ lpd, no monies gtb x 20 ea GTB  20x each           BOR / bicep curls/ tricep kickbacks             Prone 45, 90 x 2             Supine AROM flex/ scap  20x each            S/L abd, er  20x each  (ER w/ towel roll) Ther Activity                                       Gait Training                                       Modalities

## 2023-02-23 ENCOUNTER — TELEPHONE (OUTPATIENT)
Dept: OBGYN CLINIC | Facility: MEDICAL CENTER | Age: 48
End: 2023-02-23

## 2023-02-23 NOTE — TELEPHONE ENCOUNTER
Caller: Juan M Mcclendon from [facility] 45: [address]ardon    Reason for call:    Juan M Mcclendon requesting office notes and status, faxed to [phone]      Call back#: n/a

## 2023-02-24 ENCOUNTER — OFFICE VISIT (OUTPATIENT)
Dept: PHYSICAL THERAPY | Facility: CLINIC | Age: 48
End: 2023-02-24

## 2023-02-24 DIAGNOSIS — S46.012D ROTATOR CUFF STRAIN, LEFT, SUBSEQUENT ENCOUNTER: ICD-10-CM

## 2023-02-24 DIAGNOSIS — M75.42 SUBACROMIAL IMPINGEMENT OF LEFT SHOULDER: ICD-10-CM

## 2023-02-24 DIAGNOSIS — S46.212D BICEPS STRAIN, LEFT, SUBSEQUENT ENCOUNTER: Primary | ICD-10-CM

## 2023-02-24 DIAGNOSIS — S29.011D STRAIN OF LEFT PECTORALIS MUSCLE, SUBSEQUENT ENCOUNTER: ICD-10-CM

## 2023-02-24 NOTE — PROGRESS NOTES
Daily Note     Today's date: 2023  Patient name: Shima Escobar  : 1975  MRN: [id number]  Referring provider: Sammy Byers  Dx:   Encounter Diagnosis     ICD-10-CM    1  Biceps strain, left, subsequent encounter  S46 212D       2  Subacromial impingement of left shoulder  M75 42       3  Strain of left pectoralis muscle, subsequent encounter  S29 011D       4  Rotator cuff strain, left, subsequent encounter  S46 012D                       Subjective: patient reports sleeping poorly last night and feeling fatigued today  Notes feeling good post session  C/o end range discomfort mainly with flexion    Objective: See treatment diary below      Assessment: Tolerated treatment well overall today -  FULL aarom all planes with (+) achiness/ discomfort end range flexion reported  Tenderness left biceps persists       Plan: Continue per plan of care  Progress treatment as tolerated  Return to work planned for 3-6-23      Precautions: Left RTC strain, small tear sst, pect strain     [facility]  Access Code: [email or web address]        Manuals           FOTO db                         Prom left shoulder   LA db                                    IASTM left pect/ biceps db LA db                       Neuro Re-Ed             Back rolls/ scap retract 20x ea 20x each HEP                       TRX walk outs   10s x 10                                                               Ther Ex             UBE  Alt  6' 6' alt           Wall slides  10s x 10 :10  10x 10s x 10           pulleys 3 min 10s hold :10  x5' 5'  10s                        TB rows/ lpd, no monies, ext  gtb x 20 ea GTB  20x each gtb x 20 ea          BOR / tricep kickbacks   2# x 20 ea           Prone 45, 90 x 2   2# x 20           Supine AROM flex/ scap  20x each  2# x 20 ea           S/L abd, er  20x each  (ER w/ towel roll) 2# x 20 ea           Stand flex/ scap to 90   2# x 20 ea           Serratus/ triceps   2# x 20 ea                                                                                                                  Ther Activity                                       Gait Training                                       Modalities

## 2023-02-27 ENCOUNTER — TELEPHONE (OUTPATIENT)
Dept: SURGERY | Facility: CLINIC | Age: 48
End: 2023-02-27

## 2023-02-27 NOTE — TELEPHONE ENCOUNTER
Sherren Memos,   [facility] 14 minutes ago (10:37 AM)     DF  For his BM he should continue the GI recommendations  Continue the colace and fiber, miralax as needed  He should follow up with GI for this also and was seen by GI in the hospital    All his incisions are around the level of the belly button  Did he mean in his groin or under an incision  It sounds ok for now and I will see him at his appointment on Thursday  Any changes he should let us know     Thank you

## 2023-02-27 NOTE — TELEPHONE ENCOUNTER
Spoke to pt  He again stated the bulge was on his lower ab   I advised him to follow up with GI pt stated he has always had problems with constipation just that it got worse after surgery

## 2023-02-27 NOTE — TELEPHONE ENCOUNTER
PT CALLED STATING HE HAS A BULGE BESIDE HIS LOWER AB INCISION WHICH HE NEVER DID BEFORE  PT STATES IT HURTS A LOT, LYING DOWN HELPS WITH PAIN, DENIES ANY FEVER OR CHILLS, ANY NAUSEA VOMITING OR DIARRHEA, NO URINARY PROBLEMS BUT STATES HE IS STILL TAKING COLACE 2 TIMES A DAY AND A FIBER SUPPLEMENT EACH NIGHT AS HE IS STILL STRUGGLING WITH BM  HE DENIES SEVERE CONSTIPATION BUT STATES HE IS NOT HAVING "NORMAL" BM   PT HAS FOLLOW UP 3/2, PLEASE ADVISE IF PT NEEDS TO BE SEEN SOONER OR IF WE SHOULD PROVIDE OTHER INSTRUCTIONS       PT AWARE IF ITS OK TO WAIT UNTIL Thursday I will not call but to call us if his symptoms get worse

## 2023-02-28 ENCOUNTER — NURSE TRIAGE (OUTPATIENT)
Dept: OTHER | Facility: OTHER | Age: 48
End: 2023-02-28

## 2023-02-28 NOTE — TELEPHONE ENCOUNTER
Regarding: abdominal pain/groin pain (post op but calling into regular GI, see notes in chart from 2/27)  ----- Message from Adelia Rosario sent at 2/28/2023  7:36 AM EST -----  "I have abdominal pain and groin pain and I can't even bend over    I did have surgery earlier this month and see surgeon on Thursday but surgeon also recommended I follow up with GI which is why I am calling Dr Juan Diego Bojorquez office now "

## 2023-02-28 NOTE — TELEPHONE ENCOUNTER
Patient with hernia repair surgery 2/3  Patient states he has a lump on right side of stomach that is causing pain and making it hard for him to bend over  Patient denies vomiting, constipation and fever  Please follow up  Reason for Disposition  • [1] MILD-MODERATE post-op pain (e g , pain scale 1-7) AND [2] not controlled with pain medications    Answer Assessment - Initial Assessment Questions  1  SYMPTOM: "What's the main symptom you're concerned about?" (e g , pain, fever, vomiting)      Pain, can't bend over, bump     2  ONSET: "When did the symptoms start?"        3  SURGERY: "What surgery was performed?"      Hernia repair surgery    4  DATE of SURGERY: "When was surgery performed?"       2/3    5  ANESTHESIA: " What type of anesthesia did you have?" (e g , general, spinal, epidural, local)      General    6  PAIN: "Is there any pain?" If Yes, ask: "How bad is it?"  (Scale 1-10; or mild, moderate, severe)      Moderate pain where lump is in stomach    7  FEVER: "Do you have a fever?" If Yes, ask: "What is your temperature, how was it measured, and when did it start?"      Denies    8  VOMITING: "Is there any vomiting?" If yes, ask: "How many times?"      Denies    9  BLEEDING: "Is there any bleeding?" If Yes, ask: "How much?" and "Where?"      Denies    10   OTHER SYMPTOMS: "Do you have any other symptoms?" (e g , drainage from wound, painful urination, constipation)        Denies    Can't bend over  Bump in stomach right side of stomach and pain    Protocols used: POST-OP SYMPTOMS AND QUESTIONS-[facility] / [facility]

## 2023-03-01 ENCOUNTER — APPOINTMENT (OUTPATIENT)
Dept: PHYSICAL THERAPY | Facility: CLINIC | Age: 48
End: 2023-03-01

## 2023-03-02 ENCOUNTER — OFFICE VISIT (OUTPATIENT)
Dept: SURGERY | Facility: CLINIC | Age: 48
End: 2023-03-02

## 2023-03-02 VITALS
HEART RATE: 80 BPM | TEMPERATURE: 98 F | RESPIRATION RATE: 16 BRPM | OXYGEN SATURATION: 96 % | DIASTOLIC BLOOD PRESSURE: 90 MMHG | HEIGHT: 71 IN | BODY MASS INDEX: 26.04 KG/M2 | SYSTOLIC BLOOD PRESSURE: 130 MMHG | WEIGHT: 186 LBS

## 2023-03-02 DIAGNOSIS — K40.20 NON-RECURRENT BILATERAL INGUINAL HERNIA WITHOUT OBSTRUCTION OR GANGRENE: Primary | ICD-10-CM

## 2023-03-02 DIAGNOSIS — K59.00 CONSTIPATION: ICD-10-CM

## 2023-03-02 NOTE — PROGRESS NOTES
Assessment/Plan:  68-year-old male status post laparoscopic bilateral inguinal hernia repair with mesh on 2/3/2023, subsequent hospital admission for constipation and colonic ileus  -Patient is tolerating a diet well and having normal bowel function  -Denies any additional constipation or issues having bowel movements  -Does note some right lower quadrant abdominal pain and a small bump in the area  -Laparoscopic incisions healing well without erythema induration or drainage, no signs of hernia recurrence in the bilateral groin  -There is a small subcutaneous nodule in the patient's right lower quadrant just tender to palpation, this could be secondary to a lipoma versus an area of prior heparin injection while in the hospital  -BMP and CBC from 2/14/2023 reviewed  -Colonoscopy report from 2/8/2023 reviewed  -Discharge summary from 2/14/2023 reviewed  -Patient has no restrictions on 3/3/2023  -Recommend patient continue to monitor the area of pain in his right lower quadrant and if this does not resolve in time to call office for evaluation  -Follow-up in office as needed     1  Non-recurrent bilateral inguinal hernia without obstruction or gangrene    2  Constipation               Subjective:      Patient ID: Edilia Paul is a 50 y o  male  Triage Notes:    Patient is a 68-year-old male who presents the office for evaluation status post laparoscopic bilateral inguinal hernia repair with mesh  He was hospitalized due to the patient and colonic ileus postoperatively  He states he is having normal bowel movements at this time  He is taking Colace but sometimes this causes him to have loose stools  He is tolerating a diet well  He does state that there is a nodule in his right lower quadrant that causes him some discomfort with palpation        The following portions of the patient's history were reviewed and updated as appropriate: allergies, current medications, past family history, past medical history, past social history, past surgical history and problem list     Review of Systems   Constitutional: Negative for chills, fatigue and fever  HENT: Negative for congestion, hearing loss, rhinorrhea and sore throat  Eyes: Negative for pain and discharge  Respiratory: Negative for cough, chest tightness and shortness of breath  Cardiovascular: Negative for chest pain and palpitations  Gastrointestinal: Positive for abdominal pain  Negative for constipation, diarrhea, nausea and vomiting  Endocrine: Negative for cold intolerance and heat intolerance  Genitourinary: Negative for difficulty urinating and dysuria  Musculoskeletal: Negative for back pain and neck pain  Skin: Negative for color change and rash  Allergic/Immunologic: Negative for environmental allergies and food allergies  Neurological: Negative for seizures and headaches  Hematological: Negative for adenopathy  Does not bruise/bleed easily  Psychiatric/Behavioral: Negative for confusion and hallucinations  Objective:      /90   Pulse 80   Temp 98 °F (36 7 °C) (Temporal)   Resp 16   Ht 5' 11" (1 803 m)   Wt 84 4 kg (186 lb)   SpO2 96%   BMI 25 94 kg/m²     Below is the patient's most recent value for Albumin, ALT, AST, BUN, Calcium, Chloride, Cholesterol, CO2, Creatinine, GFR, Glucose, HDL, Hematocrit, Hemoglobin, Hemoglobin A1C, LDL, Magnesium, Phosphorus, Platelets, Potassium, PSA, Sodium, Triglycerides, and WBC     Lab Results   Component Value Date    ALT 73 02/07/2023    AST 66 (H) 02/07/2023    BUN 6 02/14/2023    CALCIUM 9 5 02/14/2023     02/14/2023    CHOL 114 (L) 04/26/2017    CO2 27 02/14/2023    CREATININE 0 56 (L) 02/14/2023    HDL 23 (L) 09/14/2021    HCT 39 3 02/14/2023    HGB 12 8 02/14/2023    HGBA1C 5 4 08/24/2021    MG 2 1 02/09/2023    PHOS 4 0 02/09/2023     (L) 02/14/2023    K 4 0 02/14/2023     04/26/2017    TRIG 672 (H) 09/14/2021    WBC 5 75 02/14/2023     Note: for a comprehensive list of the patient's lab results, access the Results Review activity  Physical Exam  Constitutional:       Appearance: Normal appearance  HENT:      Head: Normocephalic and atraumatic  Nose: Nose normal    Eyes:      General: No scleral icterus  Conjunctiva/sclera: Conjunctivae normal    Cardiovascular:      Rate and Rhythm: Normal rate  Pulmonary:      Effort: Pulmonary effort is normal    Abdominal:      General: There is no distension  Palpations: Abdomen is soft  Comments: Laparoscopic incisions healing well without erythema induration or drainage, some ecchymosis still present on the abdomen, no signs of hernia recurrence in the bilateral groin, there is a small 0 5 x 0 5 cm nodule in the right lower quadrant in the subcutaneous tissue which is tender to palpation, some surrounding ecchymosis present   Musculoskeletal:         General: No signs of injury  Skin:     General: Skin is warm  Coloration: Skin is not jaundiced  Neurological:      General: No focal deficit present  Mental Status: He is alert and oriented to person, place, and time     Psychiatric:         Mood and Affect: Mood normal          Behavior: Behavior normal

## 2023-03-03 ENCOUNTER — OFFICE VISIT (OUTPATIENT)
Dept: PHYSICAL THERAPY | Facility: CLINIC | Age: 48
End: 2023-03-03

## 2023-03-03 DIAGNOSIS — S29.011D STRAIN OF LEFT PECTORALIS MUSCLE, SUBSEQUENT ENCOUNTER: ICD-10-CM

## 2023-03-03 DIAGNOSIS — S46.212D BICEPS STRAIN, LEFT, SUBSEQUENT ENCOUNTER: ICD-10-CM

## 2023-03-03 DIAGNOSIS — S46.012D ROTATOR CUFF STRAIN, LEFT, SUBSEQUENT ENCOUNTER: ICD-10-CM

## 2023-03-03 DIAGNOSIS — M75.42 SUBACROMIAL IMPINGEMENT OF LEFT SHOULDER: Primary | ICD-10-CM

## 2023-03-03 NOTE — PROGRESS NOTES
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  Objective: See treatment diary below      Assessment: Tolerated treatment well overall today -  Full left shoulder arom   Mild pectoralis tenderness persists  Plan: Continue per plan of care  Progress treatment as tolerated  Return to work planned for 3-6-23   Patient will call Monday to discuss his ability to return to PT due to extensive work schedule  Precautions: Left RTC strain, small tear sst, pect strain     [place] [address]  Access Code: [email or web address]        Manuals 2/16 2/22 2/24 3/3         FOTO db                         Prom left shoulder   LA db                                    IASTM left pect/ biceps db LA db db                      Neuro Re-Ed             Back rolls/ scap retract 20x ea 20x each HEP HEP                      TRX walk outs   10s x 10  10s x 10                                                             Ther Ex             UBE  Alt  6' 6' alt  6' alt         Wall slides  10s x 10 :10  10x 10s x 10  10s x 10         pulleys 3 min 10s hold :10  x5' 5'  10s  5'  10s                       TB rows/ lpd, no monies, ext  gtb x 20 ea GTB  20x each gtb x 20 ea btb x 20 ea          BOR / tricep kickbacks   2# x 20 ea  3# x 20 ea          Prone 45, 90 x 2   2# x 20  3# x 20          Supine AROM flex/ scap  20x each  2# x 20 ea  3# x 20 ea          S/L abd, er  20x each  (ER w/ towel roll) 2# x 20 ea  3# x 20 ea          Stand flex/ scap to 90   2# x 20 ea  3# x 20 ea          Serratus/ triceps   2# x 20 ea 3# x 20 ea                                                                                                                 Ther Activity                                       Gait Training                                       Modalities

## 2023-03-07 DIAGNOSIS — E78.2 MIXED HYPERLIPIDEMIA: ICD-10-CM

## 2023-03-07 DIAGNOSIS — E78.1 HYPERTRIGLYCERIDEMIA: ICD-10-CM

## 2023-03-07 RX ORDER — OMEGA-3-ACID ETHYL ESTERS 1 G/1
2 CAPSULE, LIQUID FILLED ORAL 2 TIMES DAILY
Qty: 360 CAPSULE | Refills: 0 | Status: SHIPPED | OUTPATIENT
Start: 2023-03-07

## 2023-03-07 RX ORDER — FENOFIBRIC ACID 135 MG/1
1 CAPSULE, DELAYED RELEASE ORAL DAILY
Qty: 90 CAPSULE | Refills: 0 | Status: SHIPPED | OUTPATIENT
Start: 2023-03-07

## 2023-03-08 ENCOUNTER — APPOINTMENT (OUTPATIENT)
Dept: PHYSICAL THERAPY | Facility: CLINIC | Age: 48
End: 2023-03-08

## 2023-03-10 ENCOUNTER — APPOINTMENT (OUTPATIENT)
Dept: PHYSICAL THERAPY | Facility: CLINIC | Age: 48
End: 2023-03-10

## 2023-03-15 ENCOUNTER — APPOINTMENT (OUTPATIENT)
Dept: PHYSICAL THERAPY | Facility: CLINIC | Age: 48
End: 2023-03-15

## 2023-03-16 ENCOUNTER — OFFICE VISIT (OUTPATIENT)
Dept: GASTROENTEROLOGY | Facility: CLINIC | Age: 48
End: 2023-03-16

## 2023-03-16 VITALS
OXYGEN SATURATION: 98 % | DIASTOLIC BLOOD PRESSURE: 76 MMHG | WEIGHT: 187.4 LBS | SYSTOLIC BLOOD PRESSURE: 120 MMHG | BODY MASS INDEX: 26.23 KG/M2 | HEIGHT: 71 IN | HEART RATE: 62 BPM

## 2023-03-16 DIAGNOSIS — K86.1 ACUTE ON CHRONIC PANCREATITIS (HCC): ICD-10-CM

## 2023-03-16 DIAGNOSIS — K58.0 IRRITABLE BOWEL SYNDROME WITH DIARRHEA: Primary | ICD-10-CM

## 2023-03-16 DIAGNOSIS — K85.90 ACUTE ON CHRONIC PANCREATITIS (HCC): ICD-10-CM

## 2023-03-16 NOTE — PROGRESS NOTES
[facility] - Outpatient Follow-up Note  Wendy Martin 46 y o  male MRN: [id number]  Encounter: [id number]          ASSESSMENT AND PLAN:      1  Irritable bowel syndrome with diarrhea  - He has a history of IBS-D v SIBO due to chronic pancreatitis with diarrhea in the past that has improved with rifaximin courses  - We will trial this again and see if this improves his bloating and diarrhea  - Discussed using pepto, avoiding imodium for now given his recent hospitalization for the colonic ileus/dilatation requiring decompression  - If no significant improvement with rifaximin, we can trial cholestyramine      2  Acute on chronic pancreatitis (HCC)  - On creon 36K U BID with meals and 12K with snacks  - We will check fecal elastase testing given his diarrhea postprandially to ensure he is on the right dose of creon  - He is s/p EUS with celiac plexus block about a year ago for pain related to chronic pancreatitis and has generally done well but is starting to have some mild pain again so we discussed he may need to see Dr Crescencio Beck again for repeat block    Follow up in 2 months  He is up to date on his cirrhosis bloodwork/imaging and we will discuss this further at his next visit, will address above symptoms primarily today   ______________________________________________________________________    SUBJECTIVE:  Juliann Paris is a 47 yo M with a PMH of chronic pancreatitis, hypertriglyceridemia on multiple agents previously requiring plasmapheresis, cirrhosis, GERD, presenting for hospital follow up after he developed a postoperative colonic ileus after a bilateral inguinal hernia repair in the beginning of February  He required a colonoscopy with decompression on 2/8  He reports that his abdominal distention is significantly improved but he still feels very bloated   He is also struggling with urgent postprandial diarrhea within a half hour of eating which 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  There is no blood in the stool  He is on creon twice daily with meals and a small dose with snacks  He has dealt with diarrhea in the past and reports improvement with rifaximin courses previously  REVIEW OF SYSTEMS IS OTHERWISE NEGATIVE  Historical Information   Past Medical History:   Diagnosis Date   • Asthma    • Chronic pain disorder    • GERD (gastroesophageal reflux disease)    • Hyperlipidemia    • Hypertension    • Liver abscess    • Meniscus tear     left knee work injury last assessed 08/24/2016   • Pancreatitis    • Pneumonia      Past Surgical History:   Procedure Laterality Date   • CELIAC PLEXUS BLOCK Bilateral 10/29/2018    Procedure: SPLANCHNIC NERVE BLOCK;  Surgeon: Norma Ferraro MD;  [facility];  Service: Pain Management    • CELIAC PLEXUS BLOCK Bilateral 01/10/2019    Procedure: SPLANCHNIC NERVE BLOCK;  Surgeon: Norma Ferraro MD;  [facility];  Service: Pain Management    • CHOLECYSTECTOMY     • COLONOSCOPY     • ESOPHAGOGASTRODUODENOSCOPY N/A 11/16/2017    Procedure: ESOPHAGOGASTRODUODENOSCOPY (EGD); Surgeon: Jacques Puga MD;  [facility]: [facility]; Service: Gastroenterology   • ESOPHAGOGASTRODUODENOSCOPY N/A 04/19/2018    Procedure: ESOPHAGOGASTRODUODENOSCOPY (EGD); Surgeon: Lucía Owen MD;  [facility]; Service: Gastroenterology   • ESOPHAGOGASTRODUODENOSCOPY N/A 05/10/2018    Procedure: ESOPHAGOGASTRODUODENOSCOPY (EGD); Surgeon: Genevieve Virk MD;  [facility];   Service: Gastroenterology   • HERNIA REPAIR Bilateral 2/3/2023    Procedure: REPAIR HERNIA INGUINAL, LAPAROSCOPIC,;  Surgeon: Naomi Zimmerman DO;  [facility];  Service: General   • IR TEMPORARY DIALYSIS CATHETER PLACEMENT  02/28/2019   • KNEE ARTHROSCOPY Bilateral    • KNEE ARTHROSCOPY Right 2009    cibishino last assessed 08/24/2016   • KNEE ARTHROSCOPY Right 07/01/2019   • LIVER SURGERY     • NERVE BLOCK Bilateral 05/29/2018 Procedure: SPLANCHNIC NERVE BLOCK;  Surgeon: Caleb Berkowitz MD;  [facility];  Service: Pain Management    • PANCREAS SURGERY      stents   • PANCREATIC CYST EXCISION     • SC INJECTION ANES LMBR/THRC PARAVERTBRL SYMPATHETIC Bilateral 12/28/2017    Procedure: SPLANCHNIC NERVE BLOCK;  Surgeon: Caleb Berkowitz MD;  [facility];  Service: Pain Management    •  [facility] RADIOLOGIC MONITRNG Bilateral 05/09/2017    Procedure: CELIAC PLEXUS BLOCK ;  Surgeon: Caleb Berkowitz MD;  [facility];  Service: Pain Management    •  [facility]    Procedure: SPLANCHNIC NERVE BLOCK at T12;  Surgeon: Caleb Berkowitz MD;  [facility];  Service: Pain Management    •  [facility] RADIOLOGIC MONITRNG Bilateral 08/08/2017    Procedure: BILATERAL SPLANCHNIC NERVE BLOCK T12;  Surgeon: Caleb Berkowitz MD;  [facility];  Service: Pain Management    •  [facility]/ RADIOLOGIC MONITRNG Bilateral 04/18/2019    Procedure: BLOCK / INJECTION CELIAC PLEXUS;  Surgeon: Caleb Berkowitz MD;  [facility];  Service: Pain Management    •  [facility] RADIOLOGIC MONITRNG Bilateral 08/20/2019    Procedure: SPLANCHNIC NERVE BLOCK;  Surgeon: Caleb Berkowitz MD;  [facility];  Service: Pain Management    •  [facility] RADIOLOGIC MONITRNG Bilateral 10/17/2019    Procedure: SPLANCHNIC NERVE BLOCK;  Surgeon: Caleb Berkowitz MD;  [facility];  Service: Pain Management    • SC LAPAROSCOPY SURG CHOLECYSTECTOMY N/A 02/14/2017    Procedure: LAPAROSCOPIC CHOLECYSTECTOMY, IOC, POSSIBLE OPEN ;  Surgeon: Pepe Fuchs MD;  [facility];  Service: General   • ROTATOR CUFF REPAIR Right    • SHOULDER ARTHROSCOPY     • SHOULDER ARTHROSCOPY Right      Social History   Social History     Substance and Sexual Activity   Alcohol Use Yes   • Alcohol/week: 2 0 standard drinks   • Types: 2 Cans of beer per week    Comment: on weekends     Social History     Substance and Sexual Activity   Drug Use Never     Social History     Tobacco Use   Smoking Status Former   • Packs/day: 1 00   • Years:    • Pack years:    • Types: Cigarettes   • Quit date: 3/15/2021   • Years since quittin 0   Smokeless Tobacco Never     Family History   Problem Relation Age of Onset   • Cirrhosis Mother    • Heart disease Other         cardiac disorder   • Cancer Other        Meds/Allergies       Current Outpatient Medications:   •  albuterol (PROVENTIL HFA,VENTOLIN HFA) 90 mcg/act inhaler  •  amLODIPine (NORVASC) 5 mg tablet  •  Choline Fenofibrate (Fenofibric Acid) 135 MG CPDR  •  Creon 32035-36400 units capsule  •  Empagliflozin 10 MG TABS  •  esomeprazole (NexIUM) 40 MG capsule  •  famotidine (PEPCID) 40 MG tablet  •  insulin degludec Mosie Ishikawa FlexTouch) 100 units/mL injection pen  •  Insulin Pen Needle (Pen Needles) 32G X 5 MM MISC  •  lisinopril (ZESTRIL) 20 mg tablet  •  Multiple Vitamins-Minerals (MULTIVITAMIN ADULTS PO)  •  omega-3-acid ethyl esters (LOVAZA) 1 g capsule  •  ondansetron (ZOFRAN-ODT) 4 mg disintegrating tablet  •  Pancrelipase, Lip-Prot-Amyl, (Creon) 35749-963309 units CPEP  •  propranolol (INDERAL) 20 mg tablet  •  rifaximin (XIFAXAN) 550 mg tablet  •  rosuvastatin (CRESTOR) 40 MG tablet  •  traZODone (DESYREL) 50 mg tablet    Allergies   Allergen Reactions   • Bee Venom Swelling           Objective     Blood pressure 120/76, pulse 62, height 5' 11" (1 803 m), weight 85 kg (187 lb 6 4 oz), SpO2 98 %  Body mass index is 26 14 kg/m²        PHYSICAL EXAM:      General Appearance:   Alert, cooperative, no distress   HEENT:   Normocephalic, atraumatic, anicteric      Neck:  Supple, symmetrical, trachea midline   Lungs:   Clear to auscultation bilaterally; no rales, rhonchi or wheezing; respirations unlabored    Heart[de-identified]   Regular rate and rhythm; no murmur, rub, or gallop  Abdomen:   Soft, non-tender, non-distended; normal bowel sounds; no masses, no organomegaly    Genitalia:   Deferred    Rectal:   Deferred    Extremities:  No cyanosis, clubbing or edema    Pulses:  2+ and symmetric    Skin:  No jaundice, rashes, or lesions    Lymph nodes:  No palpable cervical lymphadenopathy        Lab Results:   No visits with results within 1 Day(s) from this visit  Latest known visit with results is:   No results displayed because visit has over 200 results  Radiology Results:   No results found

## 2023-03-17 ENCOUNTER — APPOINTMENT (OUTPATIENT)
Dept: PHYSICAL THERAPY | Facility: CLINIC | Age: 48
End: 2023-03-17

## 2023-03-20 DIAGNOSIS — I10 ESSENTIAL HYPERTENSION: ICD-10-CM

## 2023-03-20 RX ORDER — AMLODIPINE BESYLATE 5 MG/1
5 TABLET ORAL DAILY
Qty: 30 TABLET | Refills: 0 | Status: SHIPPED | OUTPATIENT
Start: 2023-03-20

## 2023-03-22 ENCOUNTER — APPOINTMENT (OUTPATIENT)
Dept: PHYSICAL THERAPY | Facility: CLINIC | Age: 48
End: 2023-03-22

## 2023-03-24 ENCOUNTER — APPOINTMENT (OUTPATIENT)
Dept: PHYSICAL THERAPY | Facility: CLINIC | Age: 48
End: 2023-03-24

## 2023-03-29 ENCOUNTER — APPOINTMENT (OUTPATIENT)
Dept: PHYSICAL THERAPY | Facility: CLINIC | Age: 48
End: 2023-03-29

## 2023-03-29 DIAGNOSIS — E78.2 MIXED HYPERLIPIDEMIA: ICD-10-CM

## 2023-03-29 RX ORDER — ROSUVASTATIN CALCIUM 40 MG/1
TABLET, COATED ORAL
Qty: 90 TABLET | Refills: 0 | Status: SHIPPED | OUTPATIENT
Start: 2023-03-29

## 2023-03-31 ENCOUNTER — APPOINTMENT (OUTPATIENT)
Dept: PHYSICAL THERAPY | Facility: CLINIC | Age: 48
End: 2023-03-31

## 2023-04-03 DIAGNOSIS — K86.1 CHRONIC PANCREATITIS, UNSPECIFIED PANCREATITIS TYPE (HCC): ICD-10-CM

## 2023-04-05 DIAGNOSIS — J45.909 UNCOMPLICATED ASTHMA, UNSPECIFIED ASTHMA SEVERITY, UNSPECIFIED WHETHER PERSISTENT: ICD-10-CM

## 2023-04-05 RX ORDER — ALBUTEROL SULFATE 90 UG/1
AEROSOL, METERED RESPIRATORY (INHALATION)
Qty: 54 G | Refills: 0 | Status: SHIPPED | OUTPATIENT
Start: 2023-04-05

## 2023-04-09 ENCOUNTER — HOSPITAL ENCOUNTER (EMERGENCY)
Facility: HOSPITAL | Age: 48
Discharge: HOME | End: 2023-04-10
Attending: EMERGENCY MEDICINE | Admitting: EMERGENCY MEDICINE

## 2023-04-09 ENCOUNTER — APPOINTMENT (EMERGENCY)
Dept: CT IMAGING | Facility: HOSPITAL | Age: 48
End: 2023-04-09

## 2023-04-09 DIAGNOSIS — R10.9 ABDOMINAL PAIN: Primary | ICD-10-CM

## 2023-04-09 LAB
ALBUMIN SERPL BCP-MCNC: 4.3 G/DL (ref 3.5–5)
ALP SERPL-CCNC: 175 U/L (ref 34–104)
ALT SERPL W P-5'-P-CCNC: 40 U/L (ref 7–52)
ANION GAP SERPL CALCULATED.3IONS-SCNC: 11 MMOL/L (ref 4–13)
AST SERPL W P-5'-P-CCNC: 82 U/L (ref 13–39)
BASOPHILS # BLD AUTO: 0.07 THOUSANDS/ΜL (ref 0–0.1)
BASOPHILS NFR BLD AUTO: 1 % (ref 0–1)
BILIRUB SERPL-MCNC: 0.91 MG/DL (ref 0.2–1)
BUN SERPL-MCNC: 15 MG/DL (ref 5–25)
CALCIUM SERPL-MCNC: 9.1 MG/DL (ref 8.4–10.2)
CHLORIDE SERPL-SCNC: 98 MMOL/L (ref 96–108)
CO2 SERPL-SCNC: 24 MMOL/L (ref 21–32)
CREAT SERPL-MCNC: 0.63 MG/DL (ref 0.6–1.3)
EOSINOPHIL # BLD AUTO: 0.13 THOUSAND/ΜL (ref 0–0.61)
EOSINOPHIL NFR BLD AUTO: 1 % (ref 0–6)
ERYTHROCYTE [DISTWIDTH] IN BLOOD BY AUTOMATED COUNT: 13 % (ref 11.6–15.1)
GFR SERPL CREATININE-BSD FRML MDRD: 114 ML/MIN/1.73SQ M
GLUCOSE SERPL-MCNC: 99 MG/DL (ref 65–140)
HCT VFR BLD AUTO: 42.7 % (ref 36.5–49.3)
HGB BLD-MCNC: 15.2 G/DL (ref 12–17)
IMM GRANULOCYTES # BLD AUTO: 0.02 THOUSAND/UL (ref 0–0.2)
IMM GRANULOCYTES NFR BLD AUTO: 0 % (ref 0–2)
LIPASE SERPL-CCNC: 53 U/L (ref 11–82)
LYMPHOCYTES # BLD AUTO: 4.12 THOUSANDS/ΜL (ref 0.6–4.47)
LYMPHOCYTES NFR BLD AUTO: 42 % (ref 14–44)
MCH RBC QN AUTO: 34.2 PG (ref 26.8–34.3)
MCHC RBC AUTO-ENTMCNC: 35.6 G/DL (ref 31.4–37.4)
MCV RBC AUTO: 96 FL (ref 82–98)
MONOCYTES # BLD AUTO: 0.73 THOUSAND/ΜL (ref 0.17–1.22)
MONOCYTES NFR BLD AUTO: 7 % (ref 4–12)
NEUTROPHILS # BLD AUTO: 4.81 THOUSANDS/ΜL (ref 1.85–7.62)
NEUTS SEG NFR BLD AUTO: 49 % (ref 43–75)
NRBC BLD AUTO-RTO: 0 /100 WBCS
PLATELET # BLD AUTO: 162 THOUSANDS/UL (ref 149–390)
PMV BLD AUTO: 10.2 FL (ref 8.9–12.7)
POTASSIUM SERPL-SCNC: 3.7 MMOL/L (ref 3.5–5.3)
PROT SERPL-MCNC: 7.2 G/DL (ref 6.4–8.4)
RBC # BLD AUTO: 4.45 MILLION/UL (ref 3.88–5.62)
SODIUM SERPL-SCNC: 133 MMOL/L (ref 135–147)
WBC # BLD AUTO: 9.88 THOUSAND/UL (ref 4.31–10.16)

## 2023-04-09 RX ORDER — MORPHINE SULFATE 4 MG/ML
4 INJECTION, SOLUTION INTRAMUSCULAR; INTRAVENOUS ONCE
Status: COMPLETED | OUTPATIENT
Start: 2023-04-09 | End: 2023-04-09

## 2023-04-09 RX ORDER — KETOROLAC TROMETHAMINE 30 MG/ML
15 INJECTION, SOLUTION INTRAMUSCULAR; INTRAVENOUS ONCE
Status: COMPLETED | OUTPATIENT
Start: 2023-04-09 | End: 2023-04-09

## 2023-04-09 RX ORDER — ONDANSETRON 2 MG/ML
4 INJECTION INTRAMUSCULAR; INTRAVENOUS ONCE
Status: COMPLETED | OUTPATIENT
Start: 2023-04-09 | End: 2023-04-09

## 2023-04-09 RX ADMIN — SODIUM CHLORIDE 1000 ML: 0.9 INJECTION, SOLUTION INTRAVENOUS at 22:03

## 2023-04-09 RX ADMIN — KETOROLAC TROMETHAMINE 15 MG: 30 INJECTION, SOLUTION INTRAMUSCULAR at 22:03

## 2023-04-09 RX ADMIN — IOHEXOL 100 ML: 350 INJECTION, SOLUTION INTRAVENOUS at 23:05

## 2023-04-09 RX ADMIN — MORPHINE SULFATE 4 MG: 4 INJECTION, SOLUTION INTRAMUSCULAR; INTRAVENOUS at 22:56

## 2023-04-09 RX ADMIN — ONDANSETRON 4 MG: 2 INJECTION INTRAMUSCULAR; INTRAVENOUS at 22:03

## 2023-04-09 NOTE — Clinical Note
Hossein Nolan was seen and treated in our emergency department on 4/9/2023  No restrictions            Diagnosis:     Artazucena Wise  may return to work on return date  He may return on this date: 04/12/2023         If you have any questions or concerns, please don't hesitate to call        Dex Bahena RN    ______________________________           _______________          _______________  [facility]                              Date                                Time

## 2023-04-10 VITALS
HEART RATE: 81 BPM | RESPIRATION RATE: 16 BRPM | DIASTOLIC BLOOD PRESSURE: 86 MMHG | SYSTOLIC BLOOD PRESSURE: 129 MMHG | OXYGEN SATURATION: 93 % | TEMPERATURE: 98.3 F

## 2023-04-10 LAB
BACTERIA UR QL AUTO: NORMAL /HPF
BILIRUB UR QL STRIP: NEGATIVE
CLARITY UR: CLEAR
COLOR UR: ABNORMAL
GLUCOSE UR STRIP-MCNC: ABNORMAL MG/DL
HGB UR QL STRIP.AUTO: NEGATIVE
KETONES UR STRIP-MCNC: NEGATIVE MG/DL
LEUKOCYTE ESTERASE UR QL STRIP: NEGATIVE
NITRITE UR QL STRIP: NEGATIVE
NON-SQ EPI CELLS URNS QL MICRO: NORMAL /HPF
PH UR STRIP.AUTO: 5.5 [PH]
PROT UR STRIP-MCNC: ABNORMAL MG/DL
RBC #/AREA URNS AUTO: NORMAL /HPF
SP GR UR STRIP.AUTO: >=1.05 (ref 1–1.03)
UROBILINOGEN UR STRIP-ACNC: <2 MG/DL
WBC #/AREA URNS AUTO: NORMAL /HPF

## 2023-04-10 RX ORDER — FAMOTIDINE 20 MG/1
20 TABLET, FILM COATED ORAL 2 TIMES DAILY
Qty: 30 TABLET | Refills: 0 | Status: SHIPPED | OUTPATIENT
Start: 2023-04-10

## 2023-04-25 ENCOUNTER — TELEPHONE (OUTPATIENT)
Dept: OBGYN CLINIC | Facility: HOSPITAL | Age: 48
End: 2023-04-25

## 2023-04-25 NOTE — TELEPHONE ENCOUNTER
Caller:Viet Wolff    Doctor: Pancho Frederick    Reason for call: Calling to schedule pt for a w/comp injury on 5/11/2023. I lost the call and I am not sure if this information was sent over for his claim.    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

## 2023-04-25 NOTE — ED PROVIDER NOTES
History  Chief Complaint   Patient presents with   • Abdominal Pain     Pt arrived ambulatory with c/o urq abdominal pain     68-year-old male presented to the emergency department for evaluation abdominal pain  Patient's had 1 to 2 days of right upper quadrant aching nonradiating abdominal pain  Associated with nausea without vomiting  No fevers or chills  No changes in bowel or bladder habits  No cough chest pain or shortness of breath  Prior to Admission Medications   Prescriptions Last Dose Informant Patient Reported? Taking?    Choline Fenofibrate (Fenofibric Acid) 135 MG CPDR   No No   Sig: Take 1 capsule (135 mg total) by mouth daily   Creon 58985-20079 units capsule   No No   Sig: take 1 capsule by mouth up to 3 times per day as needed for snacks   Empagliflozin 10 MG TABS   Yes No   Sig: Take 10 mg by mouth every morning   Insulin Pen Needle (Pen Needles) 32G X 5 MM MISC   Yes No   Sig: use once daily as directed   Multiple Vitamins-Minerals (MULTIVITAMIN ADULTS PO)   Yes No   Sig: Take by mouth daily after breakfast   Pancrelipase, Lip-Prot-Amyl, (Creon) 74584-543623 units CPEP   No No   Sig: Take 1 capsule (36,000 Units total) by mouth 3 (three) times a day before meals   albuterol (PROVENTIL HFA,VENTOLIN HFA) 90 mcg/act inhaler   No No   Sig: INHALE TWO PUFFS BY MOUTH EVERY 6 HOURS AS NEEDED FOR WHEEZING   esomeprazole (NexIUM) 40 MG capsule   No No   Sig: Take 1 capsule (40 mg total) by mouth 2 (two) times a day before meals   Patient taking differently: Take 40 mg by mouth daily after breakfast   famotidine (PEPCID) 40 MG tablet   Yes No   Sig: Take 40 mg by mouth daily at bedtime as needed    insulin degludec Silver Bob FlexTouch) 100 units/mL injection pen   Yes No   Si units subcut in hs   omega-3-acid ethyl esters (LOVAZA) 1 g capsule   No No   Sig: Take 2 capsules (2 g total) by mouth 2 (two) times a day   ondansetron (ZOFRAN-ODT) 4 mg disintegrating tablet   No No   Sig: DISSOLVE ONE TABLET IN MOUTH EVERY EIGHT HOURS   propranolol (INDERAL) 20 mg tablet   No No   Sig: TAKE 1 TABLET BY MOUTH TWICE DAILY   rosuvastatin (CRESTOR) 40 MG tablet   No No   Sig: TAKE ONE TABLET BY MOUTH ONCE A DAY   traZODone (DESYREL) 50 mg tablet   No No   Sig: Take 1 tablet (50 mg total) by mouth daily at bedtime      Facility-Administered Medications: None       Past Medical History:   Diagnosis Date   • Asthma    • Chronic pain disorder    • GERD (gastroesophageal reflux disease)    • Hyperlipidemia    • Hypertension    • Liver abscess    • Meniscus tear     left knee work injury last assessed 08/24/2016   • Pancreatitis    • Pneumonia        Past Surgical History:   Procedure Laterality Date   • CELIAC PLEXUS BLOCK Bilateral 10/29/2018    Procedure: SPLANCHNIC NERVE BLOCK;  Surgeon: Olivia Loza MD;  [facility];  Service: Pain Management    • CELIAC PLEXUS BLOCK Bilateral 01/10/2019    Procedure: SPLANCHNIC NERVE BLOCK;  Surgeon: Olivia Loza MD;  [facility];  Service: Pain Management    • CHOLECYSTECTOMY     • COLONOSCOPY     • ESOPHAGOGASTRODUODENOSCOPY N/A 11/16/2017    Procedure: ESOPHAGOGASTRODUODENOSCOPY (EGD); Surgeon: Elizabeth Farfan MD;  Location: [facility]; Service: Gastroenterology   • ESOPHAGOGASTRODUODENOSCOPY N/A 04/19/2018    Procedure: ESOPHAGOGASTRODUODENOSCOPY (EGD); Surgeon: Tamiko Flannery MD;  [facility]; Service: Gastroenterology   • ESOPHAGOGASTRODUODENOSCOPY N/A 05/10/2018    Procedure: ESOPHAGOGASTRODUODENOSCOPY (EGD); Surgeon: Rebecca Arrington MD;  [facility];   Service: Gastroenterology   • HERNIA REPAIR Bilateral 2/3/2023    Procedure: REPAIR HERNIA INGUINAL, LAPAROSCOPIC,;  Surgeon: Smith Lawson DO;  [facility];  Service: General   • IR TEMPORARY DIALYSIS CATHETER PLACEMENT  02/28/2019   • KNEE ARTHROSCOPY Bilateral    • KNEE ARTHROSCOPY Right 2009    janisno last assessed 08/24/2016   • KNEE ARTHROSCOPY Right 07/01/2019   • LIVER SURGERY     • NERVE BLOCK Bilateral 05/29/2018    Procedure: SPLANCHNIC NERVE BLOCK;  Surgeon: Nieves Lang MD;  [facility];  Service: Pain Management    • PANCREAS SURGERY      stents   • PANCREATIC CYST EXCISION     • IA INJECTION ANES LMBR/THRC PARAVERTBRL SYMPATHETIC Bilateral 12/28/2017    Procedure: SPLANCHNIC NERVE BLOCK;  Surgeon: Nieves Lang MD;  [facility];  Service: Pain Management    •  [facility]/ RADIOLOGIC MONITRNG Bilateral 05/09/2017    Procedure: CELIAC PLEXUS BLOCK ;  Surgeon: Nieves Lang MD;  [facility];  Service: Pain Management    •  [facility] RADIOLOGIC MONITRNG Bilateral 06/01/2017    Procedure: SPLANCHNIC NERVE BLOCK at T12;  Surgeon: Nieves Lang MD;  [facility];  Service: Pain Management    •  [facility] RADIOLOGIC MONITRNG Bilateral 08/08/2017    Procedure: BILATERAL SPLANCHNIC NERVE BLOCK T12;  Surgeon: Nieves Lang MD;  [facility];  Service: Pain Management    •  [facility] RADIOLOGIC MONITRNG Bilateral 04/18/2019    Procedure: BLOCK / INJECTION CELIAC PLEXUS;  Surgeon: Nieves Lang MD;  [facility];  Service: Pain Management    •  [facility] RADIOLOGIC MONITRNG Bilateral 08/20/2019    Procedure: SPLANCHNIC NERVE BLOCK;  Surgeon: Nieves Lang MD;  [facility];  Service: Pain Management    •  [facility] RADIOLOGIC MONITRNG Bilateral 10/17/2019    Procedure: SPLANCHNIC NERVE BLOCK;  Surgeon: Nieves Lang MD;  [facility];  Service: Pain Management    • IA LAPAROSCOPY SURG CHOLECYSTECTOMY N/A 02/14/2017    Procedure: LAPAROSCOPIC CHOLECYSTECTOMY, IOC, POSSIBLE OPEN ;  Surgeon: Karen Conner MD;  [facility];  Service: General   • ROTATOR CUFF REPAIR Right    • SHOULDER ARTHROSCOPY     • SHOULDER ARTHROSCOPY Right        Family History   Problem Relation Age of Onset   • Cirrhosis Mother    • Heart disease Other         cardiac disorder   • Cancer Other      I have reviewed and agree with the history as documented  E-Cigarette/Vaping   • E-Cigarette Use Former User      E-Cigarette/Vaping Substances   • Nicotine Yes    • THC No    • CBD No    • Flavoring No    • Other No    • Unknown No      Social History     Tobacco Use   • Smoking status: Former     Packs/day: 1 00     Years: 20 00     Pack years: 20 00     Types: Cigarettes     Quit date: 3/15/2021     Years since quittin 1   • Smokeless tobacco: Never   Vaping Use   • Vaping Use: Former   • Substances: Nicotine   Substance Use Topics   • Alcohol use: Yes     Alcohol/week: 2 0 standard drinks     Types: 2 Cans of beer per week     Comment: on weekends   • Drug use: Never       Review of Systems   Constitutional: Negative for appetite change, chills, fatigue and fever  HENT: Negative for sneezing and sore throat  Eyes: Negative for visual disturbance  Respiratory: Negative for cough, choking, chest tightness, shortness of breath and wheezing  Cardiovascular: Negative for chest pain and palpitations  Gastrointestinal: Positive for abdominal pain and nausea  Negative for constipation, diarrhea and vomiting  Genitourinary: Negative for difficulty urinating and dysuria  Neurological: Negative for dizziness, weakness, light-headedness, numbness and headaches  All other systems reviewed and are negative  Physical Exam  Physical Exam  Vitals and nursing note reviewed  Constitutional:       General: He is not in acute distress  Appearance: He is well-developed  He is not diaphoretic  HENT:      Head: Normocephalic and atraumatic  Eyes:      Pupils: Pupils are equal, round, and reactive to light  Neck:      Vascular: No JVD  Trachea: No tracheal deviation  Cardiovascular:      Rate and Rhythm: Normal rate and regular rhythm  Heart sounds: Normal heart sounds  No murmur heard  No friction rub  No gallop  Pulmonary:      Effort: Pulmonary effort is normal  No respiratory distress  Breath sounds: Normal breath sounds  No wheezing or rales  Abdominal:      General: Bowel sounds are normal  There is no distension  Palpations: Abdomen is soft  Tenderness: There is abdominal tenderness in the right upper quadrant and epigastric area  There is no guarding or rebound  Skin:     General: Skin is warm and dry  Coloration: Skin is not pale  Neurological:      Mental Status: He is alert and oriented to person, place, and time  Cranial Nerves: No cranial nerve deficit  Motor: No abnormal muscle tone     Psychiatric:         Behavior: Behavior normal          Vital Signs  ED Triage Vitals   Temperature Pulse Respirations Blood Pressure SpO2   04/09/23 2120 04/09/23 2120 04/09/23 2120 04/09/23 2120 04/09/23 2120   98 3 °F (36 8 °C) 76 18 145/88 97 %      Temp Source Heart Rate Source Patient Position - Orthostatic VS BP Location FiO2 (%)   04/09/23 2120 04/09/23 2120 04/09/23 2120 04/09/23 2120 --   Oral Monitor Sitting Left arm       Pain Score       04/09/23 2256       7           Vitals:    04/09/23 2120 04/09/23 2330 04/10/23 0000   BP: 145/88 134/81 129/86   Pulse: 76 74 81   Patient Position - Orthostatic VS: Sitting Sitting          Visual Acuity      ED Medications  Medications   sodium chloride 0 9 % bolus 1,000 mL (0 mL Intravenous Stopped 4/10/23 0025)   ondansetron (ZOFRAN) injection 4 mg (4 mg Intravenous Given 4/9/23 2203)   ketorolac (TORADOL) injection 15 mg (15 mg Intravenous Given 4/9/23 2203)   morphine injection 4 mg (4 mg Intravenous Given 4/9/23 2256)   iohexol (OMNIPAQUE) 350 MG/ML injection (SINGLE-DOSE) 100 mL (100 mL Intravenous Given 4/9/23 2305)       Diagnostic Studies  Results Reviewed     Procedure Component Value Units Date/Time    Urine Microscopic [[id number]]  (Normal) Collected: 04/10/23 0040    Lab Status: Final result Specimen: Urine, Clean Catch Updated: 04/10/23 0119     RBC, UA 1-2 /hpf      WBC, UA None Seen /hpf      Epithelial Cells None Seen /hpf      Bacteria, UA None Seen /hpf     UA w Reflex to Microscopic w Reflex to Culture [[id number]]  (Abnormal) Collected: 04/10/23 0040    Lab Status: Final result Specimen: Urine, Clean Catch Updated: 04/10/23 0115     Color, UA Light Yellow     Clarity, UA Clear     Specific Gravity, UA >=1 050     pH, UA 5 5     Leukocytes, UA Negative     Nitrite, UA Negative     Protein, UA Trace mg/dl      Glucose, UA 1000 (1%) mg/dl      Ketones, UA Negative mg/dl      Urobilinogen, UA <2 0 mg/dl      Bilirubin, UA Negative     Occult Blood, UA Negative    Comprehensive metabolic panel [[id number]]  (Abnormal) Collected: 04/09/23 2155    Lab Status: Final result Specimen: Blood from Arm, Right Updated: 04/09/23 2227     Sodium 133 mmol/L      Potassium 3 7 mmol/L      Chloride 98 mmol/L      CO2 24 mmol/L      ANION GAP 11 mmol/L      BUN 15 mg/dL      Creatinine 0 63 mg/dL      Glucose 99 mg/dL      Calcium 9 1 mg/dL      AST 82 U/L      ALT 40 U/L      Alkaline Phosphatase 175 U/L      Total Protein 7 2 g/dL      Albumin 4 3 g/dL      Total Bilirubin 0 91 mg/dL      eGFR 114 ml/min/1 73sq m     Narrative:      Specimen Lipemic    [place] guidelines for Chronic Kidney Disease (CKD):   •  Stage 1 with normal or high GFR (GFR > 90 mL/min/1 73 square meters)  •  Stage 2 Mild CKD (GFR = 60-89 mL/min/1 73 square meters)  •  Stage 3A Moderate CKD (GFR = 45-59 mL/min/1 73 square meters)  •  Stage 3B Moderate CKD (GFR = 30-44 mL/min/1 73 square meters)  •  Stage 4 Severe CKD (GFR = 15-29 mL/min/1 73 square meters)  •  Stage 5 End Stage CKD (GFR <15 mL/min/1 73 square meters)  Note: GFR calculation is accurate only with a steady state creatinine    Lipase [[id number]]  (Normal) Collected: 04/09/23 2155    Lab Status: Final result Specimen: Blood from Arm, Right Updated: 04/09/23 2227     Lipase 53 u/L     Narrative:      Specimen Lipemic  CBC and differential [[id number]] Collected: 04/09/23 2155    Lab Status: Final result Specimen: Blood from Arm, Right Updated: 04/09/23 2205     WBC 9 88 Thousand/uL      RBC 4 45 Million/uL      Hemoglobin 15 2 g/dL      Hematocrit 42 7 %      MCV 96 fL      MCH 34 2 pg      MCHC 35 6 g/dL      RDW 13 0 %      MPV 10 2 fL      Platelets 549 Thousands/uL      nRBC 0 /100 WBCs      Neutrophils Relative 49 %      Immat GRANS % 0 %      Lymphocytes Relative 42 %      Monocytes Relative 7 %      Eosinophils Relative 1 %      Basophils Relative 1 %      Neutrophils Absolute 4 81 Thousands/µL      Immature Grans Absolute 0 02 Thousand/uL      Lymphocytes Absolute 4 12 Thousands/µL      Monocytes Absolute 0 73 Thousand/µL      Eosinophils Absolute 0 13 Thousand/µL      Basophils Absolute 0 07 Thousands/µL                  CT abdomen pelvis with contrast   Final Result by Emilee Brewer MD (04/10 0117)      Cirrhosis with sequela detailed further above  Bilateral osteonecrosis of the femoral heads  Workstation performed: [id number]                    Procedures  Procedures         ED Course                                             Medical Decision Making  49-year-old male with abdominal pain, will check labs, CT, give fluids, pain meds, antiemetics, reassess  Amount and/or Complexity of Data Reviewed  Labs: ordered  Radiology: ordered  Risk  Prescription drug management  Disposition  Final diagnoses:   Abdominal pain     Time reflects when diagnosis was documented in both MDM as applicable and the Disposition within this note     Time User Action Codes Description Comment    4/10/2023  1:49 AM Brooks Boast Add [R10 9] Abdominal pain       ED Disposition     ED Disposition   Discharge    Condition   Stable    Date/Time   Mon Apr 10, 2023  1:48 AM    Comment Sherrlyn Sever discharge to home/self care  Follow-up Information     Follow up With Specialties Details Why Contact Info    Viv Mellisa, [address], Nurse Practitioner   [facility]   [phone]            Discharge Medication List as of 4/10/2023  1:55 AM      START taking these medications    Details   !! famotidine (PEPCID) 20 mg tablet Take 1 tablet (20 mg total) by mouth 2 (two) times a day, Starting Mon 4/10/2023, Normal       !! - Potential duplicate medications found  Please discuss with provider        CONTINUE these medications which have NOT CHANGED    Details   albuterol (PROVENTIL HFA,VENTOLIN HFA) 90 mcg/act inhaler INHALE TWO PUFFS BY MOUTH EVERY 6 HOURS AS NEEDED FOR WHEEZING, Normal      Choline Fenofibrate (Fenofibric Acid) 135 MG CPDR Take 1 capsule (135 mg total) by mouth daily, Starting Tue 3/7/2023, Normal      Creon 00649-35727 units capsule take 1 capsule by mouth up to 3 times per day as needed for snacks, Normal      Empagliflozin 10 MG TABS Take 10 mg by mouth every morning, Historical Med      esomeprazole (NexIUM) 40 MG capsule Take 1 capsule (40 mg total) by mouth 2 (two) times a day before meals, Starting Thu 10/20/2022, Normal      !! famotidine (PEPCID) 40 MG tablet Take 40 mg by mouth daily at bedtime as needed , Historical Med      insulin degludec Hyla Proctor FlexTouch) 100 units/mL injection pen 20 units subcut in hs, Historical Med      Insulin Pen Needle (Pen Needles) 32G X 5 MM MISC use once daily as directed, Historical Med      Multiple Vitamins-Minerals (MULTIVITAMIN ADULTS PO) Take by mouth daily after breakfast, Historical Med      omega-3-acid ethyl esters (LOVAZA) 1 g capsule Take 2 capsules (2 g total) by mouth 2 (two) times a day, Starting Tue 3/7/2023, Normal      ondansetron (ZOFRAN-ODT) 4 mg disintegrating tablet DISSOLVE ONE TABLET IN MOUTH EVERY EIGHT HOURS, Normal      Pancrelipase, Lip-Prot-Amyl, (Creon) 80623-294955 units CPEP Take 1 capsule (36,000 Units total) by mouth 3 (three) times a day before meals, Starting Thu 11/10/2022, Normal      propranolol (INDERAL) 20 mg tablet TAKE 1 TABLET BY MOUTH TWICE DAILY, Normal      rosuvastatin (CRESTOR) 40 MG tablet TAKE ONE TABLET BY MOUTH ONCE A DAY, Normal      traZODone (DESYREL) 50 mg tablet Take 1 tablet (50 mg total) by mouth daily at bedtime, Starting Thu 11/10/2022, Normal      amLODIPine (NORVASC) 5 mg tablet Take 1 tablet (5 mg total) by mouth daily, Starting Mon 3/20/2023, Normal      lisinopril (ZESTRIL) 20 mg tablet TAKE ONE TABLET BY MOUTH EVERY DAY, Normal       !! - Potential duplicate medications found  Please discuss with provider  No discharge procedures on file      PDMP Review       Value Time User    PDMP Reviewed  Yes 1/20/2022  9:27 AM LUANA Eric          ED Provider  Electronically Signed by           Luis Baker MD  04/24/23 0765

## 2023-05-10 ENCOUNTER — OFFICE VISIT (OUTPATIENT)
Dept: FAMILY MEDICINE CLINIC | Facility: CLINIC | Age: 48
End: 2023-05-10

## 2023-05-10 VITALS
SYSTOLIC BLOOD PRESSURE: 122 MMHG | BODY MASS INDEX: 26.74 KG/M2 | HEART RATE: 70 BPM | HEIGHT: 71 IN | DIASTOLIC BLOOD PRESSURE: 78 MMHG | WEIGHT: 191 LBS | OXYGEN SATURATION: 99 %

## 2023-05-10 DIAGNOSIS — K86.1 CHRONIC PANCREATITIS, UNSPECIFIED PANCREATITIS TYPE (HCC): ICD-10-CM

## 2023-05-10 DIAGNOSIS — K75.0 LIVER ABSCESS: ICD-10-CM
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
## 2023-05-10 DIAGNOSIS — F11.20 UNCOMPLICATED OPIOID DEPENDENCE (HCC): ICD-10-CM

## 2023-05-10 RX ORDER — CEPHALEXIN 500 MG/1
500 CAPSULE ORAL EVERY 8 HOURS SCHEDULED
Qty: 21 CAPSULE | Refills: 0 | Status: SHIPPED | OUTPATIENT
Start: 2023-05-10 | End: 2023-05-17

## 2023-05-10 NOTE — PROGRESS NOTES
BMI Counseling: Body mass index is 26 64 kg/m²  The BMI is above normal  Nutrition recommendations include decreasing portion sizes, decreasing fast food intake, limiting drinks that contain sugar, moderation in carbohydrate intake, increasing intake of lean protein, reducing intake of saturated and trans fat and reducing intake of cholesterol  Exercise recommendations include moderate physical activity 150 minutes/week and exercising 3-5 times per week  No pharmacotherapy was ordered  Rationale for BMI follow-up plan is due to patient being overweight or obese  Assessment/Plan:     Chronic Problems:  No problem-specific Assessment & Plan notes found for this encounter  Visit Diagnosis:  Diagnoses and all orders for this visit:    Insect bite, unspecified site, initial encounter  Comments:  Wound care instructions given antibiotic as directed  Orders:  -     cephalexin (KEFLEX) 500 mg capsule; Take 1 capsule (500 mg total) by mouth every 8 (eight) hours for 7 days    Liver abscess  Comments:  Presently follows with GI    Uncomplicated opioid dependence ([place] [address] )  Comments:  Currently under the care of pain management    Chronic pancreatitis, unspecified pancreatitis type ([place] [address] )  Comments:  Stable, presently following with GI, endocrinology for management  Orders:  -     Ambulatory Referral to Endocrinology; Future          Subjective:    Patient ID: Rivera Christopher is a 46 y o  male      C/o insect bite   Left  lower leg , ankle   3 days   Mild tender, redness appears to be expanding   self treating with nothing     Presently under care of endocrine with [facility] md espinal , for insulin management in regard to chronic pancreatitis   Would like [facility] provider   Noted Tresiba 20 units in am chronic pancreatitis and jardiance   Patient is on ACEi/ARB for renal protection  Patient is on statin therapy  Follows with christal ramos for PMH of chronic pancreatitis, hypertriglyceridemia on multiple agents previously "requiring plasmapheresis, cirrhosis, GERD  appt scheduled dfor       Insect Bite  This is a new problem  The current episode started in the past 7 days  The problem occurs constantly  The problem has been unchanged  Associated symptoms include a rash  Pertinent negatives include no abdominal pain, anorexia, arthralgias, congestion, diaphoresis, fatigue, fever, headaches, numbness, sore throat, swollen glands or weakness  The following portions of the patient's history were reviewed and updated as appropriate: allergies, current medications, past family history, past medical history, past social history, past surgical history and problem list     Review of Systems   Constitutional: Negative for diaphoresis, fatigue and fever  HENT: Negative for congestion and sore throat  Gastrointestinal: Negative for abdominal pain and anorexia  Musculoskeletal: Negative for arthralgias  Skin: Positive for rash  Neurological: Negative for weakness, numbness and headaches  /78   Pulse 70   Ht 5' 11\" (1 803 m)   Wt 86 6 kg (191 lb)   SpO2 99%   BMI 26 64 kg/m²   Social History     Socioeconomic History   • Marital status: /Civil Union     Spouse name: Not on file   • Number of children: Not on file   • Years of education: Not on file   • Highest education level: Not on file   Occupational History   • Occupation: fulltime employment   Tobacco Use   • Smoking status: Former     Packs/day: 1 00     Years: 20 00     Pack years: 20 00     Types: Cigarettes     Quit date: 3/15/2021     Years since quittin 1   • Smokeless tobacco: Never   Vaping Use   • Vaping Use: Former   • Substances: Nicotine   Substance and Sexual Activity   • Alcohol use:  Yes     Alcohol/week: 2 0 standard drinks     Types: 2 Cans of beer per week     Comment: on weekends   • Drug use: Never   • Sexual activity: Yes     Partners: Female   Other Topics Concern   • Not on file   Social History Narrative    Lives with " family    Daily caffeine consumption     Social Determinants of Health     Financial Resource Strain: Not on file   Food Insecurity: No Food Insecurity   • Worried About [address] in the Last Year: Never true   • Ran Out of Food in the Last Year: Never true   Transportation Needs: No Transportation Needs   • Lack of Transportation (Medical): No   • Lack of Transportation (Non-Medical): No   Physical Activity: Not on file   Stress: Not on file   Social Connections: Not on file   Intimate Partner Violence: Not on file   Housing Stability: Low Risk    • Unable to Pay for Housing in the Last Year: No   • Number of Places Lived in the Last Year: 1   • Unstable Housing in the Last Year: No     Past Medical History:   Diagnosis Date   • Asthma    • Chronic pain disorder    • GERD (gastroesophageal reflux disease)    • Hyperlipidemia    • Hypertension    • Liver abscess    • Meniscus tear     left knee work injury last assessed 08/24/2016   • Pancreatitis    • Pneumonia      Family History   Problem Relation Age of Onset   • Cirrhosis Mother    • Heart disease Other         cardiac disorder   • Cancer Other      Past Surgical History:   Procedure Laterality Date   • CELIAC PLEXUS BLOCK Bilateral 10/29/2018    Procedure: SPLANCHNIC NERVE BLOCK;  Surgeon: Kenneth Saeed MD;  [facility];  Service: Pain Management    • CELIAC PLEXUS BLOCK Bilateral 01/10/2019    Procedure: SPLANCHNIC NERVE BLOCK;  Surgeon: Kenneth Saeed MD;  [facility];  Service: Pain Management    • CHOLECYSTECTOMY     • COLONOSCOPY     • ESOPHAGOGASTRODUODENOSCOPY N/A 11/16/2017    Procedure: ESOPHAGOGASTRODUODENOSCOPY (EGD); Surgeon: Michael Foster MD;  [facility]; Service: Gastroenterology   • ESOPHAGOGASTRODUODENOSCOPY N/A 04/19/2018    Procedure: ESOPHAGOGASTRODUODENOSCOPY (EGD); Surgeon: Mark Mays MD;  [facility];   Service: Gastroenterology   • ESOPHAGOGASTRODUODENOSCOPY N/A 05/10/2018    Procedure: ESOPHAGOGASTRODUODENOSCOPY (EGD); Surgeon: Campbell Gongora MD;  [facility];   Service: Gastroenterology   • HERNIA REPAIR Bilateral 2/3/2023    Procedure: REPAIR HERNIA INGUINAL, LAPAROSCOPIC,;  Surgeon: Raymond Aguilar DO;  [facility];  Service: General   • IR TEMPORARY DIALYSIS CATHETER PLACEMENT  02/28/2019   • KNEE ARTHROSCOPY Bilateral    • KNEE ARTHROSCOPY Right 2009    cibishino last assessed 08/24/2016   • KNEE ARTHROSCOPY Right 07/01/2019   • LIVER SURGERY     • NERVE BLOCK Bilateral 05/29/2018    Procedure: SPLANCHNIC NERVE BLOCK;  Surgeon: Lj Gallego MD;  [facility];  Service: Pain Management    • PANCREAS SURGERY      stents   • PANCREATIC CYST EXCISION     • MA INJECTION ANES LMBR/THRC PARAVERTBRL SYMPATHETIC Bilateral 12/28/2017    Procedure: SPLANCHNIC NERVE BLOCK;  Surgeon: Lj Gallego MD;  [facility];  Service: Pain Management    • MA INJX ANES CELIAC PLEXUS W/WO RADIOLOGIC MONITRNG Bilateral 05/09/2017    Procedure: CELIAC PLEXUS BLOCK ;  Surgeon: Lj Gallego MD;  [facility];  Service: Pain Management    • MA INJX ANES CELIAC PLEXUS W/WO RADIOLOGIC MONITRNG Bilateral 06/01/2017    Procedure: SPLANCHNIC NERVE BLOCK at T12;  Surgeon: Lj Gallego MD;  [facility];  Service: Pain Management    • MA INJX ANES CELIAC PLEXUS W/WO RADIOLOGIC MONITRNG Bilateral 08/08/2017    Procedure: BILATERAL SPLANCHNIC NERVE BLOCK T12;  Surgeon: Lj Gallego MD;  [facility];  Service: Pain Management    • MA INJX ANES CELIAC PLEXUS W/WO RADIOLOGIC MONITRNG Bilateral 04/18/2019    Procedure: BLOCK / INJECTION CELIAC PLEXUS;  Surgeon: Lj Gallego MD;  [facility];  Service: Pain Management    • MA INJX ANES CELIAC PLEXUS W/WO RADIOLOGIC MONITRNG Bilateral 08/20/2019    Procedure: SPLANCHNIC NERVE BLOCK;  Surgeon: Lj Gallego MD;  [facility];  Service: Pain Management    • MA INJX ANES CELIAC PLEXUS W/WO RADIOLOGIC MONITRNG Bilateral 10/17/2019    Procedure: SPLANCHNIC NERVE BLOCK;  Surgeon: Jen Keating MD;  [facility];  Service: Pain Management    • DC LAPAROSCOPY SURG CHOLECYSTECTOMY N/A 02/14/2017    Procedure: LAPAROSCOPIC CHOLECYSTECTOMY, IOC, POSSIBLE OPEN ;  Surgeon: Alicia Stallings MD;  [facility];  Service: General   • ROTATOR CUFF REPAIR Right    • SHOULDER ARTHROSCOPY     • SHOULDER ARTHROSCOPY Right        Current Outpatient Medications:   •  albuterol (PROVENTIL HFA,VENTOLIN HFA) 90 mcg/act inhaler, INHALE TWO PUFFS BY MOUTH EVERY 6 HOURS AS NEEDED FOR WHEEZING, Disp: 54 g, Rfl: 0  •  amLODIPine (NORVASC) 5 mg tablet, TAKE ONE TABLET BY MOUTH ONCE A DAY, Disp: 30 tablet, Rfl: 1  •  cephalexin (KEFLEX) 500 mg capsule, Take 1 capsule (500 mg total) by mouth every 8 (eight) hours for 7 days, Disp: 21 capsule, Rfl: 0  •  Choline Fenofibrate (Fenofibric Acid) 135 MG CPDR, Take 1 capsule (135 mg total) by mouth daily, Disp: 90 capsule, Rfl: 0  •  Creon 66333-85509 units capsule, take 1 capsule by mouth up to 3 times per day as needed for snacks, Disp: 90 capsule, Rfl: 0  •  Empagliflozin 10 MG TABS, Take 10 mg by mouth every morning, Disp: , Rfl:   •  esomeprazole (NexIUM) 40 MG capsule, Take 1 capsule (40 mg total) by mouth 2 (two) times a day before meals (Patient taking differently: Take 40 mg by mouth daily after breakfast), Disp: 60 capsule, Rfl: 11  •  famotidine (PEPCID) 20 mg tablet, Take 1 tablet (20 mg total) by mouth 2 (two) times a day, Disp: 30 tablet, Rfl: 0  •  famotidine (PEPCID) 40 MG tablet, Take 40 mg by mouth daily at bedtime as needed , Disp: , Rfl:   •  insulin degludec Veryl Soulier FlexTouch) 100 units/mL injection pen, 20 units subcut in hs, Disp: , Rfl:   •  lisinopril (ZESTRIL) 20 mg tablet, Take 1 tablet (20 mg total) by mouth daily, Disp: 90 tablet, Rfl: 1  •  Multiple Vitamins-Minerals (MULTIVITAMIN ADULTS PO), Take by mouth daily after breakfast, Disp: , Rfl: •  omega-3-acid ethyl esters (LOVAZA) 1 g capsule, Take 2 capsules (2 g total) by mouth 2 (two) times a day, Disp: 360 capsule, Rfl: 0  •  ondansetron (ZOFRAN-ODT) 4 mg disintegrating tablet, DISSOLVE ONE TABLET IN MOUTH EVERY EIGHT HOURS, Disp: 30 tablet, Rfl: 0  •  Pancrelipase, Lip-Prot-Amyl, (Creon) 50073-930516 units CPEP, Take 1 capsule (36,000 Units total) by mouth 3 (three) times a day before meals, Disp: 90 capsule, Rfl: 0  •  propranolol (INDERAL) 20 mg tablet, TAKE 1 TABLET BY MOUTH TWICE DAILY, Disp: 180 tablet, Rfl: 3  •  rosuvastatin (CRESTOR) 40 MG tablet, TAKE ONE TABLET BY MOUTH ONCE A DAY, Disp: 90 tablet, Rfl: 0  •  traZODone (DESYREL) 50 mg tablet, Take 1 tablet (50 mg total) by mouth daily at bedtime, Disp: 30 tablet, Rfl: 0  •  Insulin Pen Needle (Pen Needles) 32G X 5 MM MISC, use once daily as directed, Disp: , Rfl:     Allergies   Allergen Reactions   • Bee Venom Swelling          Lab Review   Admission on 04/09/2023, Discharged on 04/10/2023   Component Date Value   • WBC 04/09/2023 9 88    • RBC 04/09/2023 4 45    • Hemoglobin 04/09/2023 15 2    • Hematocrit 04/09/2023 42 7    • MCV 04/09/2023 96    • MCH 04/09/2023 34 2    • MCHC 04/09/2023 35 6    • RDW 04/09/2023 13 0    • MPV 04/09/2023 10 2    • Platelets 16/56/4616 162    • nRBC 04/09/2023 0    • Neutrophils Relative 04/09/2023 49    • Immat GRANS % 04/09/2023 0    • Lymphocytes Relative 04/09/2023 42    • Monocytes Relative 04/09/2023 7    • Eosinophils Relative 04/09/2023 1    • Basophils Relative 04/09/2023 1    • Neutrophils Absolute 04/09/2023 4 81    • Immature Grans Absolute 04/09/2023 0 02    • Lymphocytes Absolute 04/09/2023 4 12    • Monocytes Absolute 04/09/2023 0 73    • Eosinophils Absolute 04/09/2023 0 13    • Basophils Absolute 04/09/2023 0 07    • Sodium 04/09/2023 133 (L)    • Potassium 04/09/2023 3 7    • Chloride 04/09/2023 98    • CO2 04/09/2023 24    • ANION GAP 04/09/2023 11    • BUN 04/09/2023 15    • Creatinine 04/09/2023 0 63    • Glucose 04/09/2023 99    • Calcium 04/09/2023 9 1    • AST 04/09/2023 82 (H)    • ALT 04/09/2023 40    • Alkaline Phosphatase 04/09/2023 175 (H)    • Total Protein 04/09/2023 7 2    • Albumin 04/09/2023 4 3    • Total Bilirubin 04/09/2023 0 91    • eGFR 04/09/2023 114    • Lipase 04/09/2023 53    • Color, UA 04/10/2023 Light Yellow    • Clarity, UA 04/10/2023 Clear    • Specific Gravity, UA 04/10/2023 >=1 050 (H)    • pH, UA 04/10/2023 5 5    • Leukocytes, UA 04/10/2023 Negative    • Nitrite, UA 04/10/2023 Negative    • Protein, UA 04/10/2023 Trace (A)    • Glucose, UA 04/10/2023 1000 (1%) (A)    • Ketones, UA 04/10/2023 Negative    • Urobilinogen, UA 04/10/2023 <2 0    • Bilirubin, UA 04/10/2023 Negative    • Occult Blood, UA 04/10/2023 Negative    • RBC, UA 04/10/2023 1-2    • WBC, UA 04/10/2023 None Seen    • Epithelial Cells 04/10/2023 None Seen    • Bacteria, UA 04/10/2023 None Seen         Imaging: No results found  Objective:     Physical Exam  Constitutional:       General: He is not in acute distress  Appearance: He is well-developed  He is not ill-appearing or toxic-appearing  HENT:      Head: Normocephalic and atraumatic  Cardiovascular:      Rate and Rhythm: Normal rate and regular rhythm  Heart sounds: Normal heart sounds  Pulmonary:      Effort: Pulmonary effort is normal       Breath sounds: Normal breath sounds  Abdominal:      General: Bowel sounds are normal       Palpations: Abdomen is soft  Musculoskeletal:         General: Normal range of motion  Cervical back: Normal range of motion and neck supple  Skin:     Findings: Rash present  Comments: Wound insect bite undetermined  Mildly erythemic, nonfluctuant, negative warm  Erythemic area with well demarcated  Negative distal swelling   Neurological:      Mental Status: He is alert and oriented to person, place, and time  Deep Tendon Reflexes: Reflexes are normal and symmetric  "  Psychiatric:         Behavior: Behavior normal          Thought Content: Thought content normal          Judgment: Judgment normal            There are no Patient Instructions on file for this visit  LUANA Condon    Portions of the record may have been created with voice recognition software  Occasional wrong word or \"sound a like\" substitutions may have occurred due to the inherent limitations of voice recognition software  Read the chart carefully and recognize, using context, where substitutions have occurred    "

## 2023-05-11 ENCOUNTER — OFFICE VISIT (OUTPATIENT)
Dept: GASTROENTEROLOGY | Facility: CLINIC | Age: 48
End: 2023-05-11

## 2023-05-11 ENCOUNTER — OFFICE VISIT (OUTPATIENT)
Dept: OBGYN CLINIC | Facility: CLINIC | Age: 48
End: 2023-05-11

## 2023-05-11 VITALS
OXYGEN SATURATION: 98 % | SYSTOLIC BLOOD PRESSURE: 122 MMHG | BODY MASS INDEX: 26.77 KG/M2 | HEIGHT: 71 IN | DIASTOLIC BLOOD PRESSURE: 70 MMHG | WEIGHT: 191.2 LBS | HEART RATE: 64 BPM

## 2023-05-11 VITALS
BODY MASS INDEX: 26.6 KG/M2 | HEIGHT: 71 IN | SYSTOLIC BLOOD PRESSURE: 113 MMHG | DIASTOLIC BLOOD PRESSURE: 73 MMHG | WEIGHT: 190 LBS | HEART RATE: 82 BPM

## 2023-05-11 DIAGNOSIS — K74.60 CIRRHOSIS OF LIVER WITHOUT ASCITES, UNSPECIFIED HEPATIC CIRRHOSIS TYPE (HCC): Primary | ICD-10-CM

## 2023-05-11 DIAGNOSIS — S46.002A ROTATOR CUFF INJURY, LEFT, INITIAL ENCOUNTER: Primary | ICD-10-CM

## 2023-05-11 NOTE — PROGRESS NOTES
[facility] - Outpatient Follow-up Note  Radha Hernandez 46 y o  male MRN: [id number]  Encounter: [id number]          ASSESSMENT AND PLAN:      1  Cirrhosis of liver without ascites, unspecified hepatic cirrhosis type ([address] )  - He has continued imaging showing hepatosplenomegaly and cirrhosis with intermittently elevated LFTs and known familial hypertriglyceridemia causing pancreatitis in the past  - We will plan for elastography to confirm cirrhosis or determine degree of fibrosis  - Lab workup in the past for elevated LFTs has showed a normal alpha 1 antitrypsin, normal ceruloplasmin, neg DEANNA, neg antismooth muscle ab, neg viral hepatitis panel, neg AMA  - He has had a high ferritin in the past with normal iron saturation percentage so could consider hemochromatosis genetic testing if cirrhosis is confirmed  - Discussed possible liver biopsy in the future but will hold off for now      ______________________________________________________________________    SUBJECTIVE:  Segrio Haq is a 47 yo M presenting for routine follow up  At his last visit in March she was experiencing diarrhea, bloating, and cramping which he is experienced in the past and has improved with rifaximin  We did another course of this he reports that this significantly helped him  He is generally having normal formed bowel movements at this point and feels much better  He did go to the ER in April because of right upper quadrant pain and he had a CAT scan done which showed severe hepatosplenomegaly which has been seen in the past   He also has cirrhotic changes  He does have a very complicated history of acute pancreatitis related to hypertriglyceridemia which has required plasmapheresis in the past and for which he takes insulin for to manage his triglycerides    When he had his initial episode in 2016 or 2017 he developed a pseudocyst that required drainage via EUS and then had complication with liver abscesses requiring drainage as well  He does drink alcohol but this is occasional and 1-2 drinks at a time  REVIEW OF SYSTEMS IS OTHERWISE NEGATIVE  Historical Information   Past Medical History:   Diagnosis Date   • Asthma    • Chronic pain disorder    • GERD (gastroesophageal reflux disease)    • Hyperlipidemia    • Hypertension    • Liver abscess    • Meniscus tear     left knee work injury last assessed 08/24/2016   • Pancreatitis    • Pneumonia      Past Surgical History:   Procedure Laterality Date   • CELIAC PLEXUS BLOCK Bilateral 10/29/2018    Procedure: SPLANCHNIC NERVE BLOCK;  Surgeon: Josefina Campoverde MD;  [facility];  Service: Pain Management    • CELIAC PLEXUS BLOCK Bilateral 01/10/2019    Procedure: SPLANCHNIC NERVE BLOCK;  Surgeon: Josefina Campoverde MD;  [facility];  Service: Pain Management    • CHOLECYSTECTOMY     • COLONOSCOPY     • ESOPHAGOGASTRODUODENOSCOPY N/A 11/16/2017    Procedure: ESOPHAGOGASTRODUODENOSCOPY (EGD); Surgeon: Liza Hidalgo MD;  [facility]; Service: Gastroenterology   • ESOPHAGOGASTRODUODENOSCOPY N/A 04/19/2018    Procedure: ESOPHAGOGASTRODUODENOSCOPY (EGD); Surgeon: Jose Romero MD;  [facility]; Service: Gastroenterology   • ESOPHAGOGASTRODUODENOSCOPY N/A 05/10/2018    Procedure: ESOPHAGOGASTRODUODENOSCOPY (EGD); Surgeon: Fatimah Landin MD;  [facility];   Service: Gastroenterology   • HERNIA REPAIR Bilateral 2/3/2023    Procedure: REPAIR HERNIA INGUINAL, LAPAROSCOPIC,;  Surgeon: Blaire Arciniega DO;  [facility];  Service: General   • IR TEMPORARY DIALYSIS CATHETER PLACEMENT  02/28/2019   • KNEE ARTHROSCOPY Bilateral    • KNEE ARTHROSCOPY Right 2009    cibishino last assessed 08/24/2016   • KNEE ARTHROSCOPY Right 07/01/2019   • LIVER SURGERY     • NERVE BLOCK Bilateral 05/29/2018    Procedure: SPLANCHNIC NERVE BLOCK;  Surgeon: Josefina Campoverde MD;  [facility];  Service: Pain Management    • PANCREAS SURGERY      stents   • PANCREATIC CYST EXCISION     • TN INJECTION ANES LMBR/THRC PARAVERTBRL SYMPATHETIC Bilateral 12/28/2017    Procedure: SPLANCHNIC NERVE BLOCK;  Surgeon: Jerry Elias MD;  [facility];  Service: Pain Management    •  [facility]/ RADIOLOGIC MONITRNG Bilateral 05/09/2017    Procedure: CELIAC PLEXUS BLOCK ;  Surgeon: Jerry Elias MD;  [facility];  Service: Pain Management    •  [facility]/ RADIOLOGIC MONITRNG Bilateral 06/01/2017    Procedure: SPLANCHNIC NERVE BLOCK at T12;  Surgeon: Jerry Elias MD;  [facility];  Service: Pain Management    •  [facility] RADIOLOGIC MONITRNG Bilateral 08/08/2017    Procedure: BILATERAL SPLANCHNIC NERVE BLOCK T12;  Surgeon: Jerry Elias MD;  [facility];  Service: Pain Management    • [address] RADIOLOGIC MONITRNG Bilateral 04/18/2019    Procedure: BLOCK / INJECTION CELIAC PLEXUS;  Surgeon: Jerry Elias MD;  [facility];  Service: Pain Management    • [address] RADIOLOGIC MONITRNG Bilateral 08/20/2019    Procedure: SPLANCHNIC NERVE BLOCK;  Surgeon: Jerry Elias MD;  [facility];  Service: Pain Management    •  [facility] RADIOLOGIC MONITRNG Bilateral 10/17/2019    Procedure: SPLANCHNIC NERVE BLOCK;  Surgeon: Jerry Elias MD;  [facility];  Service: Pain Management    • TN LAPAROSCOPY SURG CHOLECYSTECTOMY N/A 02/14/2017    Procedure: LAPAROSCOPIC CHOLECYSTECTOMY, IOC, POSSIBLE OPEN ;  Surgeon: Natali Osborn MD;  [facility];  Service: General   • ROTATOR CUFF REPAIR Right    • SHOULDER ARTHROSCOPY     • SHOULDER ARTHROSCOPY Right      Social History   Social History     Substance and Sexual Activity   Alcohol Use Yes   • Alcohol/week: 2 0 standard drinks   • Types: 2 Cans of beer per week    Comment: on weekends     Social History     Substance and Sexual "Activity   Drug Use Never     Social History     Tobacco Use   Smoking Status Former   • Packs/day: 1 00   • Years: 20 00   • Pack years: 20    • Types: Cigarettes   • Quit date: 3/15/2021   • Years since quittin 1   Smokeless Tobacco Never     Family History   Problem Relation Age of Onset   • Cirrhosis Mother    • Heart disease Other         cardiac disorder   • Cancer Other        Meds/Allergies       Current Outpatient Medications:   •  albuterol (PROVENTIL HFA,VENTOLIN HFA) 90 mcg/act inhaler  •  amLODIPine (NORVASC) 5 mg tablet  •  cephalexin (KEFLEX) 500 mg capsule  •  Choline Fenofibrate (Fenofibric Acid) 135 MG CPDR  •  Creon 77764-55447 units capsule  •  Empagliflozin 10 MG TABS  •  esomeprazole (NexIUM) 40 MG capsule  •  famotidine (PEPCID) 20 mg tablet  •  famotidine (PEPCID) 40 MG tablet  •  insulin degludec [place] Desanctis FlexTouch) 100 units/mL injection pen  •  Insulin Pen Needle (Pen Needles) 32G X 5 MM MISC  •  lisinopril (ZESTRIL) 20 mg tablet  •  Multiple Vitamins-Minerals (MULTIVITAMIN ADULTS PO)  •  omega-3-acid ethyl esters (LOVAZA) 1 g capsule  •  ondansetron (ZOFRAN-ODT) 4 mg disintegrating tablet  •  Pancrelipase, Lip-Prot-Amyl, (Creon) 31963-294001 units CPEP  •  propranolol (INDERAL) 20 mg tablet  •  rosuvastatin (CRESTOR) 40 MG tablet  •  traZODone (DESYREL) 50 mg tablet    Allergies   Allergen Reactions   • Bee Venom Swelling           Objective     Blood pressure 122/70, pulse 64, height 5' 11\" (1 803 m), weight 86 7 kg (191 lb 3 2 oz), SpO2 98 %  Body mass index is 26 67 kg/m²  PHYSICAL EXAM:      General Appearance:   Alert, cooperative, no distress   HEENT:   Normocephalic, atraumatic, anicteric      Neck:  Supple, symmetrical, trachea midline   Lungs:   Clear to auscultation bilaterally; no rales, rhonchi or wheezing; respirations unlabored    Heart[de-identified]   Regular rate and rhythm; no murmur, rub, or gallop     Abdomen:   Soft, non-tender, non-distended; normal bowel sounds; no " masses, no organomegaly    Genitalia:   Deferred    Rectal:   Deferred    Extremities:  No cyanosis, clubbing or edema    Pulses:  2+ and symmetric    Skin:  No jaundice, rashes, or lesions    Lymph nodes:  No palpable cervical lymphadenopathy        Lab Results:   No visits with results within 1 Day(s) from this visit     Latest known visit with results is:   Admission on 04/09/2023, Discharged on 04/10/2023   Component Date Value   • WBC 04/09/2023 9 88    • RBC 04/09/2023 4 45    • Hemoglobin 04/09/2023 15 2    • Hematocrit 04/09/2023 42 7    • MCV 04/09/2023 96    • MCH 04/09/2023 34 2    • MCHC 04/09/2023 35 6    • RDW 04/09/2023 13 0    • MPV 04/09/2023 10 2    • Platelets 38/98/3277 162    • nRBC 04/09/2023 0    • Neutrophils Relative 04/09/2023 49    • Immat GRANS % 04/09/2023 0    • Lymphocytes Relative 04/09/2023 42    • Monocytes Relative 04/09/2023 7    • Eosinophils Relative 04/09/2023 1    • Basophils Relative 04/09/2023 1    • Neutrophils Absolute 04/09/2023 4 81    • Immature Grans Absolute 04/09/2023 0 02    • Lymphocytes Absolute 04/09/2023 4 12    • Monocytes Absolute 04/09/2023 0 73    • Eosinophils Absolute 04/09/2023 0 13    • Basophils Absolute 04/09/2023 0 07    • Sodium 04/09/2023 133 (L)    • Potassium 04/09/2023 3 7    • Chloride 04/09/2023 98    • CO2 04/09/2023 24    • ANION GAP 04/09/2023 11    • BUN 04/09/2023 15    • Creatinine 04/09/2023 0 63    • Glucose 04/09/2023 99    • Calcium 04/09/2023 9 1    • AST 04/09/2023 82 (H)    • ALT 04/09/2023 40    • Alkaline Phosphatase 04/09/2023 175 (H)    • Total Protein 04/09/2023 7 2    • Albumin 04/09/2023 4 3    • Total Bilirubin 04/09/2023 0 91    • eGFR 04/09/2023 114    • Lipase 04/09/2023 53    • Color, UA 04/10/2023 Light Yellow    • Clarity, UA 04/10/2023 Clear    • Specific Gravity, UA 04/10/2023 >=1 050 (H)    • pH, UA 04/10/2023 5 5    • Leukocytes, UA 04/10/2023 Negative    • Nitrite, UA 04/10/2023 Negative    • Protein, UA 04/10/2023 Trace (A)    • Glucose, UA 04/10/2023 1000 (1%) (A)    • Ketones, UA 04/10/2023 Negative    • Urobilinogen, UA 04/10/2023 <2 0    • Bilirubin, UA 04/10/2023 Negative    • Occult Blood, UA 04/10/2023 Negative    • RBC, UA 04/10/2023 1-2    • WBC, UA 04/10/2023 None Seen    • Epithelial Cells 04/10/2023 None Seen    • Bacteria, UA 04/10/2023 None Seen          Radiology Results:   XR outside images    Result Date: 5/11/2023  Narrative: 1 2 [phone] [id number]  3 [id number]

## 2023-05-11 NOTE — LETTER
May 11, 2023     Patient: Benny Hebert  YOB: 1975  Date of Visit: 5/11/2023      To Whom it May Concern:    Benny Hebert is under my professional care  Richard Linares was seen in my office on 5/11/2023  Richard Linares is unable to return to work at this time  He is referred for MRI and will be re-evaluated afterward  If you have any questions or concerns, please don't hesitate to call           Sincerely,          Miley Cortes DO        CC: No Recipients

## 2023-05-11 NOTE — PROGRESS NOTES
Assessment/Plan:  Assessment/Plan   Diagnoses and all orders for this visit:    Rotator cuff injury, left, initial encounter  -     MRI shoulder left wo contrast; Future        57-year-old right-hand-dominant male with onset left shoulder pain from injury while at work on 4/17/2023  Discussed with patient physical exam, imaging studies, impression, and plan  X-rays left shoulder are unremarkable for acute osseous abnormality  Physical exam left shoulder noted for tenderness at the anterior, lateral, and posterior aspects  He has range of motion limited to forward flexion of 80 degrees, abduction 70 degrees, and internal rotation to the left buttock  He has 4/5 strength abduction, external rotation, and supraspinatus  There is positive to can test, positive drop arm test, and positive Vitale  His mechanism of injury, subsequent symptoms, and clinical exam are concerning for rotator cuff tear  Symptoms persist despite resting with arm sling, icing, and pain patch  At this time I will refer him for MRI left shoulder to evaluate for rotator cuff tear as surgical intervention may be warranted  He will return after having MRI done  Subjective:   Patient ID: Albania Rojas is a 46 y o  male  Chief Complaint   Patient presents with   • Left Shoulder - Pain       57-year-old right-hand-dominant male presents for evaluation left shoulder pain from injury at work on 4/17/2023  He was adjusting a wench when he felt a pop in the shoulder  He had pain described as sudden in onset, localized to the anterior lateral aspect the shoulder, sharp, severe in intensity, none radiating, worse with moving the arm, associated with limited range of motion and weakness, and improved with resting  He states he was seen by occupational medicine and placed in an arm sling  He had follow-up with occupational medicine about 1 week later and he was advised to follow-up with orthopedic care    At home he has been resting, "icing, applying heat, using pain patches  He denies improvement in symptoms  Shoulder Pain  This is a new problem  The current episode started 1 to 4 weeks ago  The problem occurs daily  The problem has been gradually worsening  Associated symptoms include arthralgias, numbness and weakness  Pertinent negatives include no joint swelling  Exacerbated by: Arm movement  He has tried rest, ice, heat, immobilization and position changes (Pain patch) for the symptoms  The treatment provided mild relief  Review of Systems   Musculoskeletal: Positive for arthralgias  Negative for joint swelling  Neurological: Positive for weakness and numbness  Objective:  Vitals:    05/11/23 1253   BP: 113/73   Pulse: 82   Weight: 86 2 kg (190 lb)   Height: 5' 11\" (1 803 m)     Left Shoulder Exam     Tenderness   Left shoulder tenderness location: Anterior, lateral, posterior  Range of Motion   Active abduction: 70   Forward flexion: 80   Left shoulder internal rotation 0 degrees: Buttock  Muscle Strength   Left shoulder normal muscle strength: 4+/5 internal rotation  Abduction: 4/5   External rotation: 4/5   Supraspinatus: 4/5     Tests   Vitale test: positive  Drop arm: positive    Comments:  Positive empty can test            Physical Exam  Vitals and nursing note reviewed  Constitutional:       General: He is not in acute distress  Appearance: He is well-developed  He is not ill-appearing or diaphoretic  HENT:      Head: Normocephalic and atraumatic  Right Ear: External ear normal       Left Ear: External ear normal    Eyes:      Conjunctiva/sclera: Conjunctivae normal    Neck:      Trachea: No tracheal deviation  Cardiovascular:      Rate and Rhythm: Normal rate  Pulmonary:      Effort: Pulmonary effort is normal  No respiratory distress  Abdominal:      General: There is no distension  Musculoskeletal:         General: Tenderness present   No swelling, deformity or signs of " injury  Skin:     General: Skin is warm and dry  Coloration: Skin is not jaundiced or pale  Neurological:      Mental Status: He is alert and oriented to person, place, and time  Psychiatric:         Mood and Affect: Mood normal          Behavior: Behavior normal          Thought Content: Thought content normal          Judgment: Judgment normal          I have personally reviewed pertinent films in PACS and my interpretation is No acute osseous abnormality of the left shoulder

## 2023-05-16 ENCOUNTER — TELEPHONE (OUTPATIENT)
Dept: OBGYN CLINIC | Facility: HOSPITAL | Age: 48
End: 2023-05-16

## 2023-05-16 NOTE — TELEPHONE ENCOUNTER
Caller: Rivas    Doctor/Office: Sebastian    CB#:       What needs to be faxed: 5/11/23 ov note and MRI order    ATTN to: Christiano Li    Fax#: [phone]      Documents were successfully e-faxed

## 2023-05-17 ENCOUNTER — TELEPHONE (OUTPATIENT)
Dept: OBGYN CLINIC | Facility: HOSPITAL | Age: 48
End: 2023-05-17

## 2023-05-17 NOTE — TELEPHONE ENCOUNTER
Caller:ASIM w/c     Doctor/Office: hercules       What needs to be faxed:Work status   ATTN to:Krzysztof     Fax#: [phone]       Documents were successfully e-faxed

## 2023-05-18 ENCOUNTER — HOSPITAL ENCOUNTER (OUTPATIENT)
Dept: MRI IMAGING | Facility: HOSPITAL | Age: 48
End: 2023-05-18
Attending: FAMILY MEDICINE

## 2023-05-18 DIAGNOSIS — S46.002A ROTATOR CUFF INJURY, LEFT, INITIAL ENCOUNTER: ICD-10-CM

## 2023-05-19 ENCOUNTER — TELEPHONE (OUTPATIENT)
Dept: OBGYN CLINIC | Facility: CLINIC | Age: 48
End: 2023-05-19

## 2023-05-22 ENCOUNTER — OFFICE VISIT (OUTPATIENT)
Dept: OBGYN CLINIC | Facility: CLINIC | Age: 48
End: 2023-05-22

## 2023-05-22 ENCOUNTER — TELEPHONE (OUTPATIENT)
Dept: OBGYN CLINIC | Facility: CLINIC | Age: 48
End: 2023-05-22

## 2023-05-22 VITALS
HEART RATE: 78 BPM | HEIGHT: 71 IN | SYSTOLIC BLOOD PRESSURE: 115 MMHG | BODY MASS INDEX: 27.02 KG/M2 | WEIGHT: 193 LBS | DIASTOLIC BLOOD PRESSURE: 75 MMHG

## 2023-05-22 DIAGNOSIS — M75.102 TEAR OF LEFT SUPRASPINATUS TENDON: Primary | ICD-10-CM

## 2023-05-22 NOTE — TELEPHONE ENCOUNTER
Called [phone] and spoke with [address] from [facility] insurance in regards to [address] [place] [address] with DOI: 4/17/23  Verified claim, claim is active and open and updated information in chart

## 2023-05-22 NOTE — PROGRESS NOTES
Assessment/Plan:  Assessment/Plan   Diagnoses and all orders for this visit:    Tear of left supraspinatus tendon  -     Ambulatory Referral to Orthopedic Surgery; Future        68-year-old right-hand-dominant male with onset left shoulder pain from injury while at work on 4/17/2023  Discussed with patient MRI results, impression, and plan  MRI left shoulder noted for degeneration of superior labrum, mild AC arthritis, tiny full-thickness tear anteriorly edge of the distal supraspinatus tendon 0 5 cm x 0 5 cm  Clinical impression is he has symptoms of rotator cuff pathology  I advised patient that it is uncertain as to whether small tear from previous injury healed and he has symptoms from retear, or if previous tear remained although he was without pain and was simply aggravated from new injury  Advised patient on options of trial of repeat steroid injection versus consultation orthopedic surgeon  He elected for orthopedic consultation  I will refer him to orthopedic surgeon for further evaluation and recommendation and treatment  He will follow-up with me as needed  Subjective:   Patient ID: Jumana Soto is a 46 y o  male  Chief Complaint   Patient presents with   • Left Shoulder - Follow-up       68-year-old right-hand-dominant male following up for onset left shoulder pain from injury while at work on 4/17/2022  He was last seen by me 11 days ago at which point he was referred for MRI of the left shoulder due to concern for rotator cuff tear  He denies changes in symptoms since his last visit  He has pain described as localized to the anterior lateral aspect of the shoulder, nonradiating, sharp, worse with moving the arm, associated with limited range of motion, and improved with resting  He has been doing range of motion exercises on his own to prevent stiffness however has been refraining from heavy lifting  Shoulder Pain  This is a new problem   The current episode started more than 1 "month ago  The problem occurs daily  The problem has been unchanged  Associated symptoms include arthralgias and weakness  Pertinent negatives include no joint swelling or numbness  Exacerbated by: Arm use  He has tried rest and immobilization (Home exercise) for the symptoms  The treatment provided mild relief  Review of Systems   Musculoskeletal: Positive for arthralgias  Negative for joint swelling  Neurological: Positive for weakness  Negative for numbness  Objective:  Vitals:    05/22/23 1353   BP: 115/75   Pulse: 78   Weight: 87 5 kg (193 lb)   Height: 5' 11\" (1 803 m)     Ortho Exam    Physical Exam  Vitals and nursing note reviewed  Constitutional:       General: He is not in acute distress  Appearance: He is well-developed  He is not ill-appearing or diaphoretic  HENT:      Head: Normocephalic and atraumatic  Right Ear: External ear normal       Left Ear: External ear normal    Eyes:      Conjunctiva/sclera: Conjunctivae normal    Neck:      Trachea: No tracheal deviation  Cardiovascular:      Rate and Rhythm: Normal rate  Pulmonary:      Effort: Pulmonary effort is normal  No respiratory distress  Abdominal:      General: There is no distension  Skin:     General: Skin is warm and dry  Coloration: Skin is not jaundiced or pale  Neurological:      Mental Status: He is alert and oriented to person, place, and time  Psychiatric:         Mood and Affect: Mood normal          Behavior: Behavior normal          Thought Content: Thought content normal          Judgment: Judgment normal          I have personally reviewed pertinent films in PACS and my interpretation is Small tear at the supraspinatus insertion          "

## 2023-05-22 NOTE — LETTER
May 22, 2023     Patient: Blaire Beatty  YOB: 1975  Date of Visit: 5/22/2023      To Whom it May Concern:    Blaire Beatty is under my professional care  Genoveva Caicedo was seen in my office on 5/22/2023  Genoveva Caicedo is unable to return to work at this time  He is referred to orthopedic surgeon for further treatment  If you have any questions or concerns, please don't hesitate to call           Sincerely,          Miley Cortes DO        CC: No Recipients

## 2023-05-25 ENCOUNTER — HOSPITAL ENCOUNTER (OUTPATIENT)
Dept: ULTRASOUND IMAGING | Facility: HOSPITAL | Age: 48
End: 2023-05-25

## 2023-05-25 DIAGNOSIS — K74.60 CIRRHOSIS OF LIVER WITHOUT ASCITES, UNSPECIFIED HEPATIC CIRRHOSIS TYPE (HCC): ICD-10-CM

## 2023-05-30 RX ORDER — PANCRELIPASE 60000; 12000; 38000 [USP'U]/1; [USP'U]/1; [USP'U]/1
36000 CAPSULE, DELAYED RELEASE PELLETS ORAL
Qty: 270 CAPSULE | Refills: 2 | Status: SHIPPED | OUTPATIENT
Start: 2023-05-30 | End: 2023-06-29

## 2023-06-01 ENCOUNTER — OFFICE VISIT (OUTPATIENT)
Dept: OBGYN CLINIC | Facility: CLINIC | Age: 48
End: 2023-06-01

## 2023-06-01 VITALS
SYSTOLIC BLOOD PRESSURE: 144 MMHG | WEIGHT: 193 LBS | DIASTOLIC BLOOD PRESSURE: 87 MMHG | HEART RATE: 59 BPM | BODY MASS INDEX: 27.02 KG/M2 | HEIGHT: 71 IN

## 2023-06-01 DIAGNOSIS — M25.512 CHRONIC LEFT SHOULDER PAIN: ICD-10-CM

## 2023-06-01 DIAGNOSIS — G89.29 CHRONIC LEFT SHOULDER PAIN: ICD-10-CM

## 2023-06-01 DIAGNOSIS — M75.102 TEAR OF LEFT SUPRASPINATUS TENDON: Primary | ICD-10-CM

## 2023-06-01 RX ORDER — CHLORHEXIDINE GLUCONATE 4 G/100ML
SOLUTION TOPICAL DAILY PRN
Status: CANCELLED | OUTPATIENT
Start: 2023-06-01

## 2023-06-01 NOTE — PROGRESS NOTES
Patient Name:  Dulce Leigh  MRN:  [id number]    [address] [place] [address]     1  Tear of left supraspinatus tendon  -     Ambulatory Referral to Orthopedic Surgery  -     Case request operating room: ARTHROSCOPY SHOULDER- Left shoulder arthroscopic rotator cuff repair; Standing  -     Ambulatory referral to [facility]; Future  -     Comprehensive metabolic panel; Future  -     CBC and differential; Future  -     EKG 12 lead; Future  -     XR chest pa & lateral; Future; Expected date: 06/01/2023  -     Case request operating room: ARTHROSCOPY SHOULDER- Left shoulder arthroscopic rotator cuff repair    2  Chronic left shoulder pain        Left shoulder full thickness supraspinatus tendon tear  · X-rays reviewed in office today with patient  · I reviewed and discussed the patient's left shoulder MRI displaying full thickness supraspinatus tendon tear  We discussed the patient's diagnosis and associated treatment options including conservative and surgical treatment  Conservative management would include formal physical therapy and a physician directed home exercise program, activity modification, injection therapy, and oral analgesics  Risks of conservative management include:  Progression of tear size, persistent pain, increased weakness, increased tendon retraction, increased fatty infiltration, decreased potential for repair and healing in the future due to tear progression/retraction/fatty infiltration/increasing age, and future development of rotator cuff arthropathy  · Surgical intervention was also discussed in the form of left shoulder arthroscopy with rotator cuff repair and all other associated procedures    Risks of surgery, including but not limited to, anesthesia complications, infection, damage to nerves and blood vessels, blood clots, postoperative stiffness, residual pain, inability to repair the torn tendon, failure of tendon healing, residual weakness, need for subsequent surgery and even "death were discussed at length  The patient's MRI does display a rotator cuff tear which corresponds to the patient's symptoms and physical exam findings  The patient has failed non-operative management including activity modification, corticosteroid injection, over the counter analgesics  The patient understands the risks and benefits of all mentioned treatment options and has no further questions  · The patient has elected to proceed forward with Left shoulder arthroscopy with rotator cuff repair  · I will see Johana Han on the day of surgery which is tentatively scheduled for 6/9/23    Chief Complaint     Right shoulder pain    History of the Present Illness     Brooklynn Mays is a RHD 46 y o  male with Left shoulder pain  Pain is rated a 6/10  The patient is referred by Dr Pablo Torres who was treating him for a tear of the supraspinatus and provided a corticosteroid injection to the left subacromial bursa space on 1/16/2023  The patient had an injury at work on 9/9/22 while moving a pallet and on 4/17/2022 while adjusting a wench and felt a pop  He works for [employer]  He returned to work after the 1st injury  He has not been back to work since the 2nd injury  He has a history of 3 shoulder arthroscopies on the right  Review of Systems     Review of Systems   Constitutional: Negative for appetite change and unexpected weight change  HENT: Negative for congestion and trouble swallowing  Eyes: Negative for visual disturbance  Respiratory: Negative for cough and shortness of breath  Cardiovascular: Negative for chest pain and palpitations  Gastrointestinal: Negative for nausea and vomiting  Endocrine: Negative for cold intolerance and heat intolerance  Musculoskeletal: Positive for arthralgias  Negative for joint swelling and myalgias  Skin: Negative for rash  Neurological: Negative for numbness         Physical Exam     /87   Pulse 59   Ht 5' 11\" (1 803 m)   Wt 87 5 kg (193 lb) " BMI 26 92 kg/m²     Left  Shoulder: Active range of motion   90 degrees forward flexion  90 degrees abduction  70 degrees external rotation   1 level restriction internal rotation    Passive range of motion   160 degrees of forward flexion     There is tenderness present over the biceps  There is 4/5 strength with supraspinatus testing  There is 4+/5 strength with infraspinatus testing  There is 4+/5 strength with subscapularis testing  Vitale test is positive  [place]'s test is positive   Speed's test is Negative  The patient is neurovascularly intact distally in the extremity  Eyes:  Anicteric sclerae  Neck:  Supple  Lungs:  Normal respiratory effort  Clear to auscultation  Cardiovascular:  Capillary refill is less than 2 seconds  Normal rhythm and rate  Skin:  Intact without erythema  Neurologic:  Sensation grossly intact to light touch  Psychiatric:  Mood and affect are appropriate  Data Review     I have personally reviewed pertinent films in PACS, and my interpretation follows:    MRI taken 5/18/23 of Left shoulder demonstrates full thickness tearing of the supraspinatus at the insertion without retraction and is well aligned  No fatty infiltration  X-rays taken 4/25/22 of Left shoulder demonstrates well preserved joint spaces  No acute osseous abnormality or fracture       Past Medical History:   Diagnosis Date   • Asthma    • Chronic pain disorder    • GERD (gastroesophageal reflux disease)    • Hyperlipidemia    • Hypertension    • Liver abscess    • Meniscus tear     left knee work injury last assessed 08/24/2016   • Pancreatitis    • Pneumonia        Past Surgical History:   Procedure Laterality Date   • CELIAC PLEXUS BLOCK Bilateral 10/29/2018    Procedure: SPLANCHNIC NERVE BLOCK;  Surgeon: Salma Osuna MD;  [facility];  Service: Pain Management    • CELIAC PLEXUS BLOCK Bilateral 01/10/2019    Procedure: SPLANCHNIC NERVE BLOCK;  Surgeon: Salma Osuna MD; [facility];  Service: Pain Management    • CHOLECYSTECTOMY     • COLONOSCOPY     • ESOPHAGOGASTRODUODENOSCOPY N/A 11/16/2017    Procedure: ESOPHAGOGASTRODUODENOSCOPY (EGD); Surgeon: Evelyn Gamez MD;  [facility]; Service: Gastroenterology   • ESOPHAGOGASTRODUODENOSCOPY N/A 04/19/2018    Procedure: ESOPHAGOGASTRODUODENOSCOPY (EGD); Surgeon: Amari Herrera MD;  [facility]; Service: Gastroenterology   • ESOPHAGOGASTRODUODENOSCOPY N/A 05/10/2018    Procedure: ESOPHAGOGASTRODUODENOSCOPY (EGD); Surgeon: Camillo Bloch, MD;  [facility];   Service: Gastroenterology   • HERNIA REPAIR Bilateral 2/3/2023    Procedure: REPAIR HERNIA INGUINAL, LAPAROSCOPIC,;  Surgeon: Verona Pickett DO;  [facility];  Service: General   • IR TEMPORARY DIALYSIS CATHETER PLACEMENT  02/28/2019   • KNEE ARTHROSCOPY Bilateral    • KNEE ARTHROSCOPY Right 2009    cibishino last assessed 08/24/2016   • KNEE ARTHROSCOPY Right 07/01/2019   • LIVER SURGERY     • NERVE BLOCK Bilateral 05/29/2018    Procedure: SPLANCHNIC NERVE BLOCK;  Surgeon: Erik Salmon MD;  [facility];  Service: Pain Management    • PANCREAS SURGERY      stents   • PANCREATIC CYST EXCISION     • MT INJECTION ANES LMBR/THRC PARAVERTBRL SYMPATHETIC Bilateral 12/28/2017    Procedure: SPLANCHNIC NERVE BLOCK;  Surgeon: Erik Salmon MD;  [facility];  Service: Pain Management    •  [facility] W/WO RADIOLOGIC MONITRNG Bilateral 05/09/2017    Procedure: CELIAC PLEXUS BLOCK ;  Surgeon: Erik Salmon MD;  [facility];  Service: Pain Management    • MT INJX ANES CELIAC PLEXUS W/WO RADIOLOGIC MONITRNG Bilateral 06/01/2017    Procedure: SPLANCHNIC NERVE BLOCK at T12;  Surgeon: Erik Salmon MD;  [facility];  Service: Pain Management    •  [facility] W/WO RADIOLOGIC MONITRNG Bilateral 08/08/2017    Procedure: BILATERAL SPLANCHNIC NERVE BLOCK T12;  Surgeon: Erik Salmon MD;  [facility];  Service: Pain Management    • [facility] INJX ANES CELIAC PLEXUS W/WO RADIOLOGIC MONITRNG Bilateral 04/18/2019    Procedure: BLOCK / INJECTION CELIAC PLEXUS;  Surgeon: Marguerite Uribe MD;  [facility];  Service: Pain Management    • VA INJX ANES CELIAC PLEXUS W/WO RADIOLOGIC MONITRNG Bilateral 08/20/2019    Procedure: SPLANCHNIC NERVE BLOCK;  Surgeon: Margueriet Uribe MD;  [facility];  Service: Pain Management    • VA INJX ANES CELIAC PLEXUS W/WO RADIOLOGIC MONITRNG Bilateral 10/17/2019    Procedure: SPLANCHNIC NERVE BLOCK;  Surgeon: Marguerite Uribe MD;  [facility];  Service: Pain Management    • VA LAPAROSCOPY SURG CHOLECYSTECTOMY N/A 02/14/2017    Procedure: LAPAROSCOPIC CHOLECYSTECTOMY, IOC, POSSIBLE OPEN ;  Surgeon: Randy Lopez MD;  [facility];  Service: General   • ROTATOR CUFF REPAIR Right    • SHOULDER ARTHROSCOPY     • SHOULDER ARTHROSCOPY Right        Allergies   Allergen Reactions   • Bee Venom Swelling       Current Outpatient Medications on File Prior to Visit   Medication Sig Dispense Refill   • albuterol (PROVENTIL HFA,VENTOLIN HFA) 90 mcg/act inhaler INHALE TWO PUFFS BY MOUTH EVERY 6 HOURS AS NEEDED FOR WHEEZING 54 g 0   • amLODIPine (NORVASC) 5 mg tablet TAKE ONE TABLET BY MOUTH ONCE A DAY 30 tablet 1   • Choline Fenofibrate (Fenofibric Acid) 135 MG CPDR Take 1 capsule (135 mg total) by mouth daily 90 capsule 0   • Empagliflozin 10 MG TABS Take 10 mg by mouth every morning     • famotidine (PEPCID) 20 mg tablet Take 1 tablet (20 mg total) by mouth 2 (two) times a day 30 tablet 0   • famotidine (PEPCID) 40 MG tablet Take 40 mg by mouth daily at bedtime as needed      • insulin degludec True Nahed FlexTouch) 100 units/mL injection pen 20 units subcut in hs     • Insulin Pen Needle (Pen Needles) 32G X 5 MM MISC use once daily as directed     • lisinopril (ZESTRIL) 20 mg tablet Take 1 tablet (20 mg total) by mouth daily 90 tablet 1   • Multiple Vitamins-Minerals (MULTIVITAMIN ADULTS PO) Take by mouth daily after breakfast     • omega-3-acid ethyl esters (LOVAZA) 1 g capsule Take 2 capsules (2 g total) by mouth 2 (two) times a day 360 capsule 0   • ondansetron (ZOFRAN-ODT) 4 mg disintegrating tablet DISSOLVE ONE TABLET IN MOUTH EVERY EIGHT HOURS 30 tablet 0   • pancrelipase, Lip-Prot-Amyl, (Creon) 12,000 units capsule Take 36,000 units of lipase by mouth 3 (three) times a day with meals 270 capsule 2   • Pancrelipase, Lip-Prot-Amyl, (Creon) 76983-022633 units CPEP Take 1 capsule (36,000 Units total) by mouth 3 (three) times a day before meals 90 capsule 0   • propranolol (INDERAL) 20 mg tablet TAKE 1 TABLET BY MOUTH TWICE DAILY 180 tablet 3   • rosuvastatin (CRESTOR) 40 MG tablet TAKE ONE TABLET BY MOUTH ONCE A DAY 90 tablet 0   • traZODone (DESYREL) 50 mg tablet Take 1 tablet (50 mg total) by mouth daily at bedtime 30 tablet 0   • esomeprazole (NexIUM) 40 MG capsule Take 1 capsule (40 mg total) by mouth 2 (two) times a day before meals (Patient not taking: Reported on 2023) 60 capsule 11     No current facility-administered medications on file prior to visit  Social History     Tobacco Use   • Smoking status: Former     Packs/day: 1 00     Years: 20 00     Total pack years: 20 00     Types: Cigarettes     Quit date: 3/15/2021     Years since quittin 2   • Smokeless tobacco: Never   Vaping Use   • Vaping Use: Former   • Substances: Nicotine   Substance Use Topics   • Alcohol use: Yes     Alcohol/week: 2 0 standard drinks of alcohol     Types: 2 Cans of beer per week     Comment: on weekends   • Drug use: Never       Family History   Problem Relation Age of Onset   • Cirrhosis Mother    • Heart disease Other         cardiac disorder   • Cancer Other              Procedures Performed     Procedures  None performed         Spencer Ortiz DO  Scribdouglas Attestation    I,:  Fabio Mobley am acting as a scribe while in the presence of the attending physician :       I,:  Alivia Ball DO personally performed the services described in this documentation    as scribed in my presence :

## 2023-06-01 NOTE — H&P (VIEW-ONLY)
Patient Name:  Mahad Esquivel  MRN:  [id number]    [address] [place] [address]     1  Tear of left supraspinatus tendon  -     Ambulatory Referral to Orthopedic Surgery  -     Case request operating room: ARTHROSCOPY SHOULDER- Left shoulder arthroscopic rotator cuff repair; Standing  -     Ambulatory referral to [facility]; Future  -     Comprehensive metabolic panel; Future  -     CBC and differential; Future  -     EKG 12 lead; Future  -     XR chest pa & lateral; Future; Expected date: 06/01/2023  -     Case request operating room: ARTHROSCOPY SHOULDER- Left shoulder arthroscopic rotator cuff repair    2  Chronic left shoulder pain        Left shoulder full thickness supraspinatus tendon tear  · X-rays reviewed in office today with patient  · I reviewed and discussed the patient's left shoulder MRI displaying full thickness supraspinatus tendon tear  We discussed the patient's diagnosis and associated treatment options including conservative and surgical treatment  Conservative management would include formal physical therapy and a physician directed home exercise program, activity modification, injection therapy, and oral analgesics  Risks of conservative management include:  Progression of tear size, persistent pain, increased weakness, increased tendon retraction, increased fatty infiltration, decreased potential for repair and healing in the future due to tear progression/retraction/fatty infiltration/increasing age, and future development of rotator cuff arthropathy  · Surgical intervention was also discussed in the form of left shoulder arthroscopy with rotator cuff repair and all other associated procedures    Risks of surgery, including but not limited to, anesthesia complications, infection, damage to nerves and blood vessels, blood clots, postoperative stiffness, residual pain, inability to repair the torn tendon, failure of tendon healing, residual weakness, need for subsequent surgery and even "death were discussed at length  The patient's MRI does display a rotator cuff tear which corresponds to the patient's symptoms and physical exam findings  The patient has failed non-operative management including activity modification, corticosteroid injection, over the counter analgesics  The patient understands the risks and benefits of all mentioned treatment options and has no further questions  · The patient has elected to proceed forward with Left shoulder arthroscopy with rotator cuff repair  · I will see Florian Hawkins on the day of surgery which is tentatively scheduled for 6/9/23    Chief Complaint     Right shoulder pain    History of the Present Illness     Jordin Mclaughlin is a RHD 46 y o  male with Left shoulder pain  Pain is rated a 6/10  The patient is referred by Dr Fredi Morton who was treating him for a tear of the supraspinatus and provided a corticosteroid injection to the left subacromial bursa space on 1/16/2023  The patient had an injury at work on 9/9/22 while moving a pallet and on 4/17/2022 while adjusting a wench and felt a pop  He works for [employer]  He returned to work after the 1st injury  He has not been back to work since the 2nd injury  He has a history of 3 shoulder arthroscopies on the right  Review of Systems     Review of Systems   Constitutional: Negative for appetite change and unexpected weight change  HENT: Negative for congestion and trouble swallowing  Eyes: Negative for visual disturbance  Respiratory: Negative for cough and shortness of breath  Cardiovascular: Negative for chest pain and palpitations  Gastrointestinal: Negative for nausea and vomiting  Endocrine: Negative for cold intolerance and heat intolerance  Musculoskeletal: Positive for arthralgias  Negative for joint swelling and myalgias  Skin: Negative for rash  Neurological: Negative for numbness         Physical Exam     /87   Pulse 59   Ht 5' 11\" (1 803 m)   Wt 87 5 kg (193 lb) " BMI 26 92 kg/m²     Left  Shoulder: Active range of motion   90 degrees forward flexion  90 degrees abduction  70 degrees external rotation   1 level restriction internal rotation    Passive range of motion   160 degrees of forward flexion     There is tenderness present over the biceps  There is 4/5 strength with supraspinatus testing  There is 4+/5 strength with infraspinatus testing  There is 4+/5 strength with subscapularis testing  Vitale test is positive  [place]'s test is positive   Speed's test is Negative  The patient is neurovascularly intact distally in the extremity  Eyes:  Anicteric sclerae  Neck:  Supple  Lungs:  Normal respiratory effort  Clear to auscultation  Cardiovascular:  Capillary refill is less than 2 seconds  Normal rhythm and rate  Skin:  Intact without erythema  Neurologic:  Sensation grossly intact to light touch  Psychiatric:  Mood and affect are appropriate  Data Review     I have personally reviewed pertinent films in PACS, and my interpretation follows:    MRI taken 5/18/23 of Left shoulder demonstrates full thickness tearing of the supraspinatus at the insertion without retraction and is well aligned  No fatty infiltration  X-rays taken 4/25/22 of Left shoulder demonstrates well preserved joint spaces  No acute osseous abnormality or fracture       Past Medical History:   Diagnosis Date   • Asthma    • Chronic pain disorder    • GERD (gastroesophageal reflux disease)    • Hyperlipidemia    • Hypertension    • Liver abscess    • Meniscus tear     left knee work injury last assessed 08/24/2016   • Pancreatitis    • Pneumonia        Past Surgical History:   Procedure Laterality Date   • CELIAC PLEXUS BLOCK Bilateral 10/29/2018    Procedure: SPLANCHNIC NERVE BLOCK;  Surgeon: Pati Dimas MD;  [facility];  Service: Pain Management    • CELIAC PLEXUS BLOCK Bilateral 01/10/2019    Procedure: SPLANCHNIC NERVE BLOCK;  Surgeon: Pati Dimas MD; [facility];  Service: Pain Management    • CHOLECYSTECTOMY     • COLONOSCOPY     • ESOPHAGOGASTRODUODENOSCOPY N/A 11/16/2017    Procedure: ESOPHAGOGASTRODUODENOSCOPY (EGD); Surgeon: Rosalind Almaguer MD;  [facility]; Service: Gastroenterology   • ESOPHAGOGASTRODUODENOSCOPY N/A 04/19/2018    Procedure: ESOPHAGOGASTRODUODENOSCOPY (EGD); Surgeon: Renzo Larkin MD;  [facility]; Service: Gastroenterology   • ESOPHAGOGASTRODUODENOSCOPY N/A 05/10/2018    Procedure: ESOPHAGOGASTRODUODENOSCOPY (EGD); Surgeon: Bridgette Villar MD;  [facility];   Service: Gastroenterology   • HERNIA REPAIR Bilateral 2/3/2023    Procedure: REPAIR HERNIA INGUINAL, LAPAROSCOPIC,;  Surgeon: Evan Sommers DO;  [facility];  Service: General   • IR TEMPORARY DIALYSIS CATHETER PLACEMENT  02/28/2019   • KNEE ARTHROSCOPY Bilateral    • KNEE ARTHROSCOPY Right 2009    cibishino last assessed 08/24/2016   • KNEE ARTHROSCOPY Right 07/01/2019   • LIVER SURGERY     • NERVE BLOCK Bilateral 05/29/2018    Procedure: SPLANCHNIC NERVE BLOCK;  Surgeon: Kael Nicolas MD;  [facility];  Service: Pain Management    • PANCREAS SURGERY      stents   • PANCREATIC CYST EXCISION     • ID INJECTION ANES LMBR/THRC PARAVERTBRL SYMPATHETIC Bilateral 12/28/2017    Procedure: SPLANCHNIC NERVE BLOCK;  Surgeon: Kael Nicolas MD;  [facility];  Service: Pain Management    •  [facility] W/WO RADIOLOGIC MONITRNG Bilateral 05/09/2017    Procedure: CELIAC PLEXUS BLOCK ;  Surgeon: Kael Nicolas MD;  [facility];  Service: Pain Management    • ID INJX ANES CELIAC PLEXUS W/WO RADIOLOGIC MONITRNG Bilateral 06/01/2017    Procedure: SPLANCHNIC NERVE BLOCK at T12;  Surgeon: Kael Nicolas MD;  [facility];  Service: Pain Management    •  [facility] W/WO RADIOLOGIC MONITRNG Bilateral 08/08/2017    Procedure: BILATERAL SPLANCHNIC NERVE BLOCK T12;  Surgeon: Kael Nicolas MD;  [facility];  Service: Pain Management    • TX INJX ANES CELIAC PLEXUS W/WO RADIOLOGIC MONITRNG Bilateral 04/18/2019    Procedure: BLOCK / INJECTION CELIAC PLEXUS;  Surgeon: Elsa Yost MD;  [facility];  Service: Pain Management    • TX INJX ANES CELIAC PLEXUS W/WO RADIOLOGIC MONITRNG Bilateral 08/20/2019    Procedure: SPLANCHNIC NERVE BLOCK;  Surgeon: Elsa Yost MD;  [facility];  Service: Pain Management    • TX INJX ANES CELIAC PLEXUS W/WO RADIOLOGIC MONITRNG Bilateral 10/17/2019    Procedure: SPLANCHNIC NERVE BLOCK;  Surgeon: Elsa Yost MD;  [facility];  Service: Pain Management    • TX LAPAROSCOPY SURG CHOLECYSTECTOMY N/A 02/14/2017    Procedure: LAPAROSCOPIC CHOLECYSTECTOMY, IOC, POSSIBLE OPEN ;  Surgeon: Jose Landeros MD;  [facility];  Service: General   • ROTATOR CUFF REPAIR Right    • SHOULDER ARTHROSCOPY     • SHOULDER ARTHROSCOPY Right        Allergies   Allergen Reactions   • Bee Venom Swelling       Current Outpatient Medications on File Prior to Visit   Medication Sig Dispense Refill   • albuterol (PROVENTIL HFA,VENTOLIN HFA) 90 mcg/act inhaler INHALE TWO PUFFS BY MOUTH EVERY 6 HOURS AS NEEDED FOR WHEEZING 54 g 0   • amLODIPine (NORVASC) 5 mg tablet TAKE ONE TABLET BY MOUTH ONCE A DAY 30 tablet 1   • Choline Fenofibrate (Fenofibric Acid) 135 MG CPDR Take 1 capsule (135 mg total) by mouth daily 90 capsule 0   • Empagliflozin 10 MG TABS Take 10 mg by mouth every morning     • famotidine (PEPCID) 20 mg tablet Take 1 tablet (20 mg total) by mouth 2 (two) times a day 30 tablet 0   • famotidine (PEPCID) 40 MG tablet Take 40 mg by mouth daily at bedtime as needed      • insulin degludec Carlos Lapidus FlexTouch) 100 units/mL injection pen 20 units subcut in hs     • Insulin Pen Needle (Pen Needles) 32G X 5 MM MISC use once daily as directed     • lisinopril (ZESTRIL) 20 mg tablet Take 1 tablet (20 mg total) by mouth daily 90 tablet 1   • Multiple Vitamins-Minerals (MULTIVITAMIN ADULTS PO) Take by mouth daily after breakfast     • omega-3-acid ethyl esters (LOVAZA) 1 g capsule Take 2 capsules (2 g total) by mouth 2 (two) times a day 360 capsule 0   • ondansetron (ZOFRAN-ODT) 4 mg disintegrating tablet DISSOLVE ONE TABLET IN MOUTH EVERY EIGHT HOURS 30 tablet 0   • pancrelipase, Lip-Prot-Amyl, (Creon) 12,000 units capsule Take 36,000 units of lipase by mouth 3 (three) times a day with meals 270 capsule 2   • Pancrelipase, Lip-Prot-Amyl, (Creon) 57536-483058 units CPEP Take 1 capsule (36,000 Units total) by mouth 3 (three) times a day before meals 90 capsule 0   • propranolol (INDERAL) 20 mg tablet TAKE 1 TABLET BY MOUTH TWICE DAILY 180 tablet 3   • rosuvastatin (CRESTOR) 40 MG tablet TAKE ONE TABLET BY MOUTH ONCE A DAY 90 tablet 0   • traZODone (DESYREL) 50 mg tablet Take 1 tablet (50 mg total) by mouth daily at bedtime 30 tablet 0   • esomeprazole (NexIUM) 40 MG capsule Take 1 capsule (40 mg total) by mouth 2 (two) times a day before meals (Patient not taking: Reported on 2023) 60 capsule 11     No current facility-administered medications on file prior to visit  Social History     Tobacco Use   • Smoking status: Former     Packs/day: 1 00     Years: 20 00     Total pack years: 20 00     Types: Cigarettes     Quit date: 3/15/2021     Years since quittin 2   • Smokeless tobacco: Never   Vaping Use   • Vaping Use: Former   • Substances: Nicotine   Substance Use Topics   • Alcohol use: Yes     Alcohol/week: 2 0 standard drinks of alcohol     Types: 2 Cans of beer per week     Comment: on weekends   • Drug use: Never       Family History   Problem Relation Age of Onset   • Cirrhosis Mother    • Heart disease Other         cardiac disorder   • Cancer Other              Procedures Performed     Procedures  None performed         Blake Obrien DO  Scribdouglas Attestation    I,:  Benjamin Carballo am acting as a scribe while in the presence of the attending physician :       I,:  Lester Dakins, DO personally performed the services described in this documentation    as scribed in my presence :

## 2023-06-02 ENCOUNTER — APPOINTMENT (OUTPATIENT)
Dept: LAB | Facility: HOSPITAL | Age: 48
End: 2023-06-02
Payer: COMMERCIAL

## 2023-06-02 ENCOUNTER — OFFICE VISIT (OUTPATIENT)
Dept: LAB | Facility: HOSPITAL | Age: 48
End: 2023-06-02

## 2023-06-02 ENCOUNTER — HOSPITAL ENCOUNTER (OUTPATIENT)
Dept: RADIOLOGY | Facility: HOSPITAL | Age: 48
End: 2023-06-02
Payer: OTHER MISCELLANEOUS

## 2023-06-02 DIAGNOSIS — M75.102 TEAR OF LEFT SUPRASPINATUS TENDON: ICD-10-CM

## 2023-06-02 DIAGNOSIS — E78.2 MIXED HYPERLIPIDEMIA: ICD-10-CM

## 2023-06-02 DIAGNOSIS — E78.1 HYPERTRIGLYCERIDEMIA: ICD-10-CM

## 2023-06-02 DIAGNOSIS — G47.00 INSOMNIA, UNSPECIFIED TYPE: ICD-10-CM

## 2023-06-02 LAB
ALBUMIN SERPL BCP-MCNC: 4.7 G/DL (ref 3.5–5)
ALP SERPL-CCNC: 125 U/L (ref 34–104)
ALT SERPL W P-5'-P-CCNC: 36 U/L (ref 7–52)
ANION GAP SERPL CALCULATED.3IONS-SCNC: 10 MMOL/L (ref 4–13)
AST SERPL W P-5'-P-CCNC: 94 U/L (ref 13–39)
BASOPHILS # BLD AUTO: 0.05 THOUSANDS/ÂΜL (ref 0–0.1)
BASOPHILS NFR BLD AUTO: 1 % (ref 0–1)
BILIRUB SERPL-MCNC: 1.74 MG/DL (ref 0.2–1)
BUN SERPL-MCNC: 12 MG/DL (ref 5–25)
CALCIUM SERPL-MCNC: 9.8 MG/DL (ref 8.4–10.2)
CHLORIDE SERPL-SCNC: 100 MMOL/L (ref 96–108)
CO2 SERPL-SCNC: 24 MMOL/L (ref 21–32)
CREAT SERPL-MCNC: 0.66 MG/DL (ref 0.6–1.3)
EOSINOPHIL # BLD AUTO: 0.12 THOUSAND/ÂΜL (ref 0–0.61)
EOSINOPHIL NFR BLD AUTO: 2 % (ref 0–6)
ERYTHROCYTE [DISTWIDTH] IN BLOOD BY AUTOMATED COUNT: 13.5 % (ref 11.6–15.1)
GFR SERPL CREATININE-BSD FRML MDRD: 112 ML/MIN/1.73SQ M
GLUCOSE P FAST SERPL-MCNC: 93 MG/DL (ref 65–99)
HCT VFR BLD AUTO: 43 % (ref 36.5–49.3)
HGB BLD-MCNC: 14.8 G/DL (ref 12–17)
IMM GRANULOCYTES # BLD AUTO: 0.01 THOUSAND/UL (ref 0–0.2)
IMM GRANULOCYTES NFR BLD AUTO: 0 % (ref 0–2)
LYMPHOCYTES # BLD AUTO: 1.71 THOUSANDS/ÂΜL (ref 0.6–4.47)
LYMPHOCYTES NFR BLD AUTO: 28 % (ref 14–44)
MCH RBC QN AUTO: 34.6 PG (ref 26.8–34.3)
MCHC RBC AUTO-ENTMCNC: 34.4 G/DL (ref 31.4–37.4)
MCV RBC AUTO: 101 FL (ref 82–98)
MONOCYTES # BLD AUTO: 0.46 THOUSAND/ÂΜL (ref 0.17–1.22)
MONOCYTES NFR BLD AUTO: 8 % (ref 4–12)
NEUTROPHILS # BLD AUTO: 3.82 THOUSANDS/ÂΜL (ref 1.85–7.62)
NEUTS SEG NFR BLD AUTO: 61 % (ref 43–75)
NRBC BLD AUTO-RTO: 0 /100 WBCS
PLATELET # BLD AUTO: 125 THOUSANDS/UL (ref 149–390)
PMV BLD AUTO: 9.9 FL (ref 8.9–12.7)
POTASSIUM SERPL-SCNC: 4.3 MMOL/L (ref 3.5–5.3)
PROT SERPL-MCNC: 7.9 G/DL (ref 6.4–8.4)
RBC # BLD AUTO: 4.28 MILLION/UL (ref 3.88–5.62)
SODIUM SERPL-SCNC: 134 MMOL/L (ref 135–147)
WBC # BLD AUTO: 6.17 THOUSAND/UL (ref 4.31–10.16)

## 2023-06-02 PROCEDURE — 80053 COMPREHEN METABOLIC PANEL: CPT

## 2023-06-02 PROCEDURE — 71046 X-RAY EXAM CHEST 2 VIEWS: CPT

## 2023-06-02 PROCEDURE — 85025 COMPLETE CBC W/AUTO DIFF WBC: CPT

## 2023-06-02 PROCEDURE — 36415 COLL VENOUS BLD VENIPUNCTURE: CPT

## 2023-06-02 RX ORDER — ONDANSETRON 4 MG/1
TABLET, ORALLY DISINTEGRATING ORAL
Qty: 30 TABLET | Refills: 0 | Status: SHIPPED | OUTPATIENT
Start: 2023-06-02 | End: 2023-06-09

## 2023-06-04 LAB
ATRIAL RATE: 64 BPM
P AXIS: 13 DEGREES
PR INTERVAL: 142 MS
QRS AXIS: 23 DEGREES
QRSD INTERVAL: 108 MS
QT INTERVAL: 438 MS
QTC INTERVAL: 451 MS
T WAVE AXIS: 22 DEGREES
VENTRICULAR RATE: 64 BPM

## 2023-06-05 ENCOUNTER — OFFICE VISIT (OUTPATIENT)
Dept: FAMILY MEDICINE CLINIC | Facility: CLINIC | Age: 48
End: 2023-06-05
Payer: OTHER MISCELLANEOUS

## 2023-06-05 ENCOUNTER — ANESTHESIA EVENT (OUTPATIENT)
Dept: PERIOP | Facility: HOSPITAL | Age: 48
End: 2023-06-05
Payer: OTHER MISCELLANEOUS

## 2023-06-05 VITALS
BODY MASS INDEX: 26.74 KG/M2 | HEIGHT: 71 IN | DIASTOLIC BLOOD PRESSURE: 70 MMHG | WEIGHT: 191 LBS | SYSTOLIC BLOOD PRESSURE: 124 MMHG | OXYGEN SATURATION: 95 % | HEART RATE: 75 BPM

## 2023-06-05 DIAGNOSIS — Z01.818 PRE-OP EXAMINATION: Primary | ICD-10-CM

## 2023-06-05 PROCEDURE — 99214 OFFICE O/P EST MOD 30 MIN: CPT | Performed by: FAMILY MEDICINE

## 2023-06-05 RX ORDER — FENOFIBRIC ACID 135 MG/1
CAPSULE, DELAYED RELEASE ORAL
Qty: 90 CAPSULE | Refills: 0 | Status: SHIPPED | OUTPATIENT
Start: 2023-06-05

## 2023-06-06 NOTE — PRE-PROCEDURE INSTRUCTIONS
Pre-Surgery Instructions:   Medication Instructions   • albuterol (PROVENTIL HFA,VENTOLIN HFA) 90 mcg/act inhaler Uses PRN- OK to take day of surgery   • amLODIPine (NORVASC) 5 mg tablet Take day of surgery  • Choline Fenofibrate (Fenofibric Acid) 135 MG CPDR Take day of surgery  • Empagliflozin 10 MG TABS Pt was instructed by PCP to hold starting 6/6/23   • esomeprazole (NexIUM) 40 MG capsule Take day of surgery  • famotidine (PEPCID) 40 MG tablet Take night before surgery   • insulin degludec [place] FlexTouch) 100 units/mL injection pen Pt was instructed by PCPt o hold starting 6/6/23   • lisinopril (ZESTRIL) 20 mg tablet Hold day of surgery  • MILK THISTLE PO Stop taking 7 days prior to surgery  • Multiple Vitamins-Minerals (MULTIVITAMIN ADULTS PO) Stop taking 7 days prior to surgery  • NON FORMULARY Stop taking 7 days prior to surgery  • omega-3-acid ethyl esters (LOVAZA) 1 g capsule Stop taking 7 days prior to surgery  • ondansetron (ZOFRAN-ODT) 4 mg disintegrating tablet Uses PRN- OK to take day of surgery   • pancrelipase, Lip-Prot-Amyl, (Creon) 12,000 units capsule Hold day of surgery  • Pancrelipase, Lip-Prot-Amyl, (Creon) 63511-274567 units CPEP Hold day of surgery  • rosuvastatin (CRESTOR) 40 MG tablet Take day of surgery  • traZODone (DESYREL) 50 mg tablet Take night before surgery   • TURMERIC PO Stop taking 7 days prior to surgery  •      Medication instructions for day surgery reviewed  Please use only a sip of water to take your instructed medications  Avoid all over the counter vitamins, supplements and NSAIDS for one week prior to surgery per anesthesia guidelines  Tylenol is ok to take as needed  You will receive a call one business day prior to surgery with an arrival time and hospital directions  If your surgery is scheduled on a Monday, the hospital will be calling you on the Friday prior to your surgery   If you have not heard from anyone by 8pm, please call the hospital supervisor through the hospital  at [phone]  Julian Abraham [phone])  Do not eat or drink anything after midnight the night before your surgery, including candy, mints, lifesavers, or chewing gum  Do not drink alcohol 24hrs before your surgery  Try not to smoke at least 24hrs before your surgery  Follow the pre surgery showering instructions as listed in the [facility] Surgical Experience Booklet” or otherwise provided by your surgeon's office  Do not shave the surgical area 24 hours before surgery  Do not apply any lotions, creams, including makeup, cologne, deodorant, or perfumes after showering on the day of your surgery  No contact lenses, eye make-up, or artificial eyelashes  Remove nail polish, including gel polish, and any artificial, gel, or acrylic nails if possible  Remove all jewelry including rings and body piercing jewelry  Wear causal clothing that is easy to take on and off  Consider your type of surgery  Keep any valuables, jewelry, piercings at home  Please bring any specially ordered equipment (sling, braces) if indicated  Arrange for a responsible person to drive you to and from the hospital on the day of your surgery  Visitor Guidelines discussed  Call the surgeon's office with any new illnesses, exposures, or additional questions prior to surgery  Please reference your [facility] Experience Booklet” for additional information to prepare for your upcoming surgery

## 2023-06-07 DIAGNOSIS — E78.2 MIXED HYPERLIPIDEMIA: ICD-10-CM

## 2023-06-07 RX ORDER — OMEGA-3-ACID ETHYL ESTERS 1 G/1
CAPSULE, LIQUID FILLED ORAL
Qty: 360 CAPSULE | Refills: 0 | Status: SHIPPED | OUTPATIENT
Start: 2023-06-07

## 2023-06-09 ENCOUNTER — HOSPITAL ENCOUNTER (OUTPATIENT)
Facility: HOSPITAL | Age: 48
Setting detail: OUTPATIENT SURGERY
Discharge: HOME | End: 2023-06-09
Attending: ORTHOPAEDIC SURGERY | Admitting: ORTHOPAEDIC SURGERY
Payer: OTHER MISCELLANEOUS

## 2023-06-09 ENCOUNTER — ANESTHESIA (OUTPATIENT)
Dept: PERIOP | Facility: HOSPITAL | Age: 48
End: 2023-06-09
Payer: OTHER MISCELLANEOUS

## 2023-06-09 VITALS
SYSTOLIC BLOOD PRESSURE: 133 MMHG | HEART RATE: 72 BPM | BODY MASS INDEX: 27.07 KG/M2 | RESPIRATION RATE: 20 BRPM | HEIGHT: 71 IN | WEIGHT: 193.34 LBS | DIASTOLIC BLOOD PRESSURE: 60 MMHG | OXYGEN SATURATION: 95 % | TEMPERATURE: 98 F

## 2023-06-09 DIAGNOSIS — M75.112 INCOMPLETE TEAR OF LEFT ROTATOR CUFF, UNSPECIFIED WHETHER TRAUMATIC: Primary | ICD-10-CM

## 2023-06-09 LAB — GLUCOSE SERPL-MCNC: 139 MG/DL (ref 65–140)

## 2023-06-09 PROCEDURE — 29827 SHO ARTHRS SRG RT8TR CUF RPR: CPT | Performed by: ORTHOPAEDIC SURGERY

## 2023-06-09 PROCEDURE — 82948 REAGENT STRIP/BLOOD GLUCOSE: CPT

## 2023-06-09 PROCEDURE — 29827 SHO ARTHRS SRG RT8TR CUF RPR: CPT | Performed by: PHYSICIAN ASSISTANT

## 2023-06-09 PROCEDURE — C1713 ANCHOR/SCREW BN/BN,TIS/BN: HCPCS | Performed by: ORTHOPAEDIC SURGERY

## 2023-06-09 PROCEDURE — 23430 REPAIR BICEPS TENDON: CPT | Performed by: PHYSICIAN ASSISTANT

## 2023-06-09 PROCEDURE — 23430 REPAIR BICEPS TENDON: CPT | Performed by: ORTHOPAEDIC SURGERY

## 2023-06-09 PROCEDURE — 29826 SHO ARTHRS SRG DECOMPRESSION: CPT | Performed by: ORTHOPAEDIC SURGERY

## 2023-06-09 PROCEDURE — 29826 SHO ARTHRS SRG DECOMPRESSION: CPT | Performed by: PHYSICIAN ASSISTANT

## 2023-06-09 DEVICE — BIO-COMPOSITE CRKSCRW 5.5X14.7MM
Type: IMPLANTABLE DEVICE | Site: SHOULDER | Status: FUNCTIONAL
Brand: ARTHREX®

## 2023-06-09 DEVICE — PROXIMAL TENODESIS IMPLANT SYSTEM REV: 0
Type: IMPLANTABLE DEVICE | Site: SHOULDER | Status: FUNCTIONAL
Brand: ARTHREX®

## 2023-06-09 RX ORDER — ROCURONIUM BROMIDE 10 MG/ML
INJECTION, SOLUTION INTRAVENOUS AS NEEDED
Status: DISCONTINUED | OUTPATIENT
Start: 2023-06-09 | End: 2023-06-09

## 2023-06-09 RX ORDER — ALBUTEROL SULFATE 2.5 MG/3ML
2.5 SOLUTION RESPIRATORY (INHALATION) ONCE AS NEEDED
Status: DISCONTINUED | OUTPATIENT
Start: 2023-06-09 | End: 2023-06-09 | Stop reason: HOSPADM

## 2023-06-09 RX ORDER — BUPIVACAINE HYDROCHLORIDE 5 MG/ML
INJECTION, SOLUTION EPIDURAL; INTRACAUDAL AS NEEDED
Status: DISCONTINUED | OUTPATIENT
Start: 2023-06-09 | End: 2023-06-09 | Stop reason: HOSPADM

## 2023-06-09 RX ORDER — ONDANSETRON 2 MG/ML
4 INJECTION INTRAMUSCULAR; INTRAVENOUS EVERY 6 HOURS PRN
Status: CANCELLED | OUTPATIENT
Start: 2023-06-09

## 2023-06-09 RX ORDER — LIDOCAINE HYDROCHLORIDE 10 MG/ML
INJECTION, SOLUTION EPIDURAL; INFILTRATION; INTRACAUDAL; PERINEURAL AS NEEDED
Status: DISCONTINUED | OUTPATIENT
Start: 2023-06-09 | End: 2023-06-09

## 2023-06-09 RX ORDER — PROMETHAZINE HYDROCHLORIDE 25 MG/ML
12.5 INJECTION, SOLUTION INTRAMUSCULAR; INTRAVENOUS ONCE AS NEEDED
Status: DISCONTINUED | OUTPATIENT
Start: 2023-06-09 | End: 2023-06-09 | Stop reason: HOSPADM

## 2023-06-09 RX ORDER — FENTANYL CITRATE 50 UG/ML
INJECTION, SOLUTION INTRAMUSCULAR; INTRAVENOUS AS NEEDED
Status: DISCONTINUED | OUTPATIENT
Start: 2023-06-09 | End: 2023-06-09

## 2023-06-09 RX ORDER — FENTANYL CITRATE/PF 50 MCG/ML
25 SYRINGE (ML) INJECTION
Status: DISCONTINUED | OUTPATIENT
Start: 2023-06-09 | End: 2023-06-09 | Stop reason: HOSPADM

## 2023-06-09 RX ORDER — BUPIVACAINE HYDROCHLORIDE 5 MG/ML
INJECTION, SOLUTION PERINEURAL
Status: COMPLETED | OUTPATIENT
Start: 2023-06-09 | End: 2023-06-09

## 2023-06-09 RX ORDER — OXYCODONE HYDROCHLORIDE AND ACETAMINOPHEN 5; 325 MG/1; MG/1
1 TABLET ORAL ONCE
Status: DISCONTINUED | OUTPATIENT
Start: 2023-06-09 | End: 2023-06-09 | Stop reason: HOSPADM

## 2023-06-09 RX ORDER — PROPOFOL 10 MG/ML
INJECTION, EMULSION INTRAVENOUS AS NEEDED
Status: DISCONTINUED | OUTPATIENT
Start: 2023-06-09 | End: 2023-06-09

## 2023-06-09 RX ORDER — ONDANSETRON 2 MG/ML
INJECTION INTRAMUSCULAR; INTRAVENOUS AS NEEDED
Status: DISCONTINUED | OUTPATIENT
Start: 2023-06-09 | End: 2023-06-09

## 2023-06-09 RX ORDER — CEFAZOLIN SODIUM 2 G/50ML
2000 SOLUTION INTRAVENOUS ONCE
Status: COMPLETED | OUTPATIENT
Start: 2023-06-09 | End: 2023-06-09

## 2023-06-09 RX ORDER — SODIUM CHLORIDE, SODIUM LACTATE, POTASSIUM CHLORIDE, CALCIUM CHLORIDE 600; 310; 30; 20 MG/100ML; MG/100ML; MG/100ML; MG/100ML
INJECTION, SOLUTION INTRAVENOUS CONTINUOUS PRN
Status: DISCONTINUED | OUTPATIENT
Start: 2023-06-09 | End: 2023-06-09

## 2023-06-09 RX ORDER — CHLORHEXIDINE GLUCONATE 4 G/100ML
SOLUTION TOPICAL DAILY PRN
Status: DISCONTINUED | OUTPATIENT
Start: 2023-06-09 | End: 2023-06-09 | Stop reason: HOSPADM

## 2023-06-09 RX ORDER — IBUPROFEN 800 MG/1
800 TABLET ORAL EVERY 8 HOURS PRN
Qty: 30 TABLET | Refills: 0 | Status: SHIPPED | OUTPATIENT
Start: 2023-06-09

## 2023-06-09 RX ORDER — MIDAZOLAM HYDROCHLORIDE 2 MG/2ML
INJECTION, SOLUTION INTRAMUSCULAR; INTRAVENOUS AS NEEDED
Status: DISCONTINUED | OUTPATIENT
Start: 2023-06-09 | End: 2023-06-09

## 2023-06-09 RX ORDER — MEPERIDINE HYDROCHLORIDE 50 MG/ML
12.5 INJECTION INTRAMUSCULAR; INTRAVENOUS; SUBCUTANEOUS
Status: DISCONTINUED | OUTPATIENT
Start: 2023-06-09 | End: 2023-06-09 | Stop reason: HOSPADM

## 2023-06-09 RX ORDER — OXYCODONE HYDROCHLORIDE AND ACETAMINOPHEN 5; 325 MG/1; MG/1
1 TABLET ORAL EVERY 4 HOURS PRN
Qty: 20 TABLET | Refills: 0 | Status: SHIPPED | OUTPATIENT
Start: 2023-06-09

## 2023-06-09 RX ORDER — ONDANSETRON 2 MG/ML
4 INJECTION INTRAMUSCULAR; INTRAVENOUS ONCE AS NEEDED
Status: COMPLETED | OUTPATIENT
Start: 2023-06-09 | End: 2023-06-09

## 2023-06-09 RX ORDER — HYDROMORPHONE HCL/PF 1 MG/ML
0.5 SYRINGE (ML) INJECTION
Status: DISCONTINUED | OUTPATIENT
Start: 2023-06-09 | End: 2023-06-09 | Stop reason: HOSPADM

## 2023-06-09 RX ADMIN — FENTANYL CITRATE 100 MCG: 50 INJECTION, SOLUTION INTRAMUSCULAR; INTRAVENOUS at 10:20

## 2023-06-09 RX ADMIN — ROCURONIUM BROMIDE 50 MG: 10 INJECTION, SOLUTION INTRAVENOUS at 10:21

## 2023-06-09 RX ADMIN — FENTANYL CITRATE 25 MCG: 50 INJECTION INTRAMUSCULAR; INTRAVENOUS at 13:02

## 2023-06-09 RX ADMIN — SUGAMMADEX 200 MG: 100 INJECTION, SOLUTION INTRAVENOUS at 12:17

## 2023-06-09 RX ADMIN — BUPIVACAINE HYDROCHLORIDE 25 ML: 5 INJECTION, SOLUTION PERINEURAL at 09:39

## 2023-06-09 RX ADMIN — FENTANYL CITRATE 25 MCG: 50 INJECTION INTRAMUSCULAR; INTRAVENOUS at 12:36

## 2023-06-09 RX ADMIN — LIDOCAINE HYDROCHLORIDE 40 MG: 10 INJECTION, SOLUTION EPIDURAL; INFILTRATION; INTRACAUDAL; PERINEURAL at 10:20

## 2023-06-09 RX ADMIN — CEFAZOLIN SODIUM 2000 MG: 2 SOLUTION INTRAVENOUS at 10:25

## 2023-06-09 RX ADMIN — PROPOFOL 200 MG: 10 INJECTION, EMULSION INTRAVENOUS at 10:21

## 2023-06-09 RX ADMIN — ONDANSETRON 4 MG: 2 INJECTION INTRAMUSCULAR; INTRAVENOUS at 10:20

## 2023-06-09 RX ADMIN — MIDAZOLAM HYDROCHLORIDE 1 MG: 1 INJECTION, SOLUTION INTRAMUSCULAR; INTRAVENOUS at 10:15

## 2023-06-09 RX ADMIN — ONDANSETRON 4 MG: 2 INJECTION INTRAMUSCULAR; INTRAVENOUS at 12:42

## 2023-06-09 RX ADMIN — MIDAZOLAM HYDROCHLORIDE 1 MG: 1 INJECTION, SOLUTION INTRAMUSCULAR; INTRAVENOUS at 10:18

## 2023-06-09 RX ADMIN — SODIUM CHLORIDE, SODIUM LACTATE, POTASSIUM CHLORIDE, AND CALCIUM CHLORIDE: .6; .31; .03; .02 INJECTION, SOLUTION INTRAVENOUS at 09:30

## 2023-06-09 NOTE — ANESTHESIA PREPROCEDURE EVALUATION
Procedure:  ARTHROSCOPY SHOULDER- Left shoulder arthroscopic rotator cuff repair (Left: Shoulder)    Relevant Problems   CARDIO   (+) Hypertension   (+) Hypertriglyceridemia   (+) RBBB      GI/HEPATIC   (+) Acute on chronic pancreatitis (HCC)   (+) Chronic pancreatitis (HCC)   (+) GERD (gastroesophageal reflux disease)   (+) Liver abscess   (+) Pancreatic lesion   (+) Pancreatitis, unspecified pancreatitis type      MUSCULOSKELETAL   (+) Cervical spondylosis      Other   (+) Uncomplicated opioid dependence ([address] )        Physical Exam    Airway    Mallampati score: I  TM Distance: >3 FB  Neck ROM: full     Dental       Cardiovascular  Cardiovascular exam normal    Pulmonary  Pulmonary exam normal     Other Findings        Anesthesia Plan  ASA Score- 2     Anesthesia Type- general with ASA Monitors  Additional Monitors:   Airway Plan: ETT  Plan Factors-Exercise tolerance (METS): >4 METS  Chart reviewed  EKG reviewed  Imaging results reviewed  Existing labs reviewed  Patient summary reviewed  Patient is not a current smoker  Patient did not smoke on day of surgery  Obstructive sleep apnea risk education given perioperatively  Induction- intravenous  Postoperative Plan- Plan for postoperative opioid use  Planned trial extubation    Informed Consent- Anesthetic plan and risks discussed with patient  I personally reviewed this patient with the CRNA  Discussed and agreed on the Anesthesia Plan with the JENNI Oden

## 2023-06-09 NOTE — DISCHARGE INSTR - AVS FIRST PAGE
Shoulder Surgery: Postoperative Instructions  Dr Mali Shelby may resume your regular diet as soon as possible  Medication    Take the pain medication as prescribed   Take pain medication with food   While taking pain medications, you may NOT operate a vehicle, heavy machinery, or appliances   While taking pain medication, you may NOT drink alcoholic beverages   If you have any reactions to your medications, stop taking them and call my office   Please keep in mind that constipation is a very common side effect of taking narcotic pain medication  Take precautions to prevent constipation:  o Drink plenty of water (6-8 glasses of 8 oz  a day)  o Avoid alcohol, caffeine, and dairy products  o Eat plenty of fiber (fruits, vegetables, and whole grains)  o Take an over the counter stool softener (Colace or Dulcolax)  o Patients that have had upper extremity surgery should take frequent walks  Activity    Minimize activity the day of surgery    DO NOT USE HEAT    Please keep ice applied to the shoulder for at least the first 72 hours or as long as pain or swelling persists  Do not apply ice directly to skin, or allow water to leak on your dressing  Place a pillow behind your elbow while lying down or sleeping  Sleeping in a more upright position (recliner) may be more comfortable initially  You should NOT roll onto the operative shoulder  You are in an immobilizer or sling and it should remain on at all times except when showering until your first postoperative visit   Open and close your hand 10 times every day for about 1 minute  You may also flex and extend your elbow each day when your sling is removed for showering  Be sure to keep your elbow at your side  DO NOT actively (on your own) lift your operative arm away from the side of your body or rotate it out away from your body  DO NOT use exercise equipment unless otherwise instructed    Sling/ Immobilizer    Use the sling/immobilizer at all times and while sleeping until your next office appointment  Showering    You may shower 4 days after surgery unless told otherwise  DO NOT immerse the shoulder under water and DO NOT rub the incision  Place new Band-Aids over the sutures after showering  You may sponge bathe for the first 72 hours, taking care to keep the dressing clean and dry  Dressing Care    It is normal to get some bloody wound seepage through the bandage  DO NOT BE ALARMED  If the dressing gets soaked with wound seepage, place more gauze over the dressing and secure with tape  If this soaks through remove the entire dressing and replace with STERILE gauze and tape    Remove all dressings at 72 hours after surgery  If there is still some wound seepage, apply a fresh STERILE gauze over the incisions and secure with tape  DO NOT TOUCH OR REMOVE THE SUTURES!! Emergency/Follow-Up   Please notify my office at [phone] if you develop any fever (101 deg or above), unexpected warmth, redness or swelling  Please call if your fingers become cold, purple, numb, or there is excessive bleeding  Please call the office within 24 hours at [phone] to schedule a follow up appt within 7-10 days from surgery

## 2023-06-09 NOTE — ANESTHESIA PROCEDURE NOTES
Peripheral Block    Patient location during procedure: holding area  Reason for block: at surgeon's request and post-op pain management  Staffing  Performed by: Tayler Swain MD  Authorized by: Tayler Swain MD    Preanesthetic Checklist  Completed: patient identified, IV checked, site marked, risks and benefits discussed, surgical consent, monitors and equipment checked, pre-op evaluation and timeout performed  Peripheral Block  Prep: ChloraPrep  Patient monitoring: continuous pulse ox and frequent blood pressure checks  Block type: interscalene  Laterality: left  Injection technique: single-shot  Procedures: ultrasound guided, Ultrasound guidance required for the procedure to increase accuracy and safety of medication placement and decrease risk of complications    Ultrasound permanent image savedbupivacaine (MARCAINE) 0 5 % - Perineural   25 mL - 6/9/2023 9:39:00 AM  Needle  Test dose: negative  Assessment  Injection assessment: incremental injection and local visualized surrounding nerve on ultrasound  Paresthesia pain: none  Heart rate change: no  Slow fractionated injection: yes  patient tolerated the procedure well with no immediate complications

## 2023-06-09 NOTE — OP NOTE
OPERATIVE REPORT  PATIENT NAME: Мария Marcelo    :  1975  MRN: [id number]  Pt Location: [facility] OR ROOM 04    SURGERY DATE: 2023    Surgeon(s) and Role:     * Ramirez Villalobos, DO - Primary     * [employer] - Assisting    Preop Diagnosis:  Tear of left supraspinatus tendon [M75 102]    Post-Op Diagnosis Codes:     * Tear of left supraspinatus tendon [M75 102]   Type II SLAP tear    Procedure(s):  Left - ARTHROSCOPY SHOULDER- Left shoulder arthroscopic rotator cuff repair    Specimen(s):  * No specimens in log *    Estimated Blood Loss:   10 mL    Drains:  * No LDAs found *    Anesthesia Type:   General w/ Interscalene Block with 10 cc 0 5% Marcaine local anesthetic    Operative Indications:  Tear of left supraspinatus tendon [M75 102]  Type II SLAP tear, persistent pain and weakness affecting activities of daily living despite nonoperative treatment including oral analgesics, activity medication, injection therapy, and physical therapy  Operative Findings:  Near full-thickness tear anterior supraspinatus tendon, type II SLAP tear, subacromial spur    Complications:   None    Procedure and Technique:    Antibiotics: 2 g Ancef  IV fluids: 500 cc crystalloid  Implants:  Arthrex 5 5 mm triple loaded BioComposite anchor x 1, 2 6 x 8 5 mm proximal biceps tenodesis button    The patient was seen in the preoperative holding area and the appropriate site of surgery was confirmed and the patient was marked  Upon bringing the patient back to the operating room he was placed supine on the operating room table and general anesthesia was provided  The patient was placed in the lateral decubitus position with the left side up  The patient was held in position with a beanbag reinforced with a seat belt and tape  All bony prominences were appropriately padded and the brachial plexus was offloaded with the appropriate ramp pillow functioning as an axillary roll   An exam under anesthesia was performed and displayed full passive range of motion without instability  The left upper extremity was prepped and draped in the usual sterile fashion and placed in 10 lbs of in-line traction  A preoperative time-out was performed to once again confirm the site of surgery and procedure  A posterior arthroscopic viewing portal was made with an 11 blade  The arthroscopic camera was inserted  Upon entering the glenohumeral joint space an anterior portal was made under direct visualization with the aid of an 18 gauge spinal needle  An anterior cannula was inserted, followed by an arthroscopic probe, and a diagnostic arthroscopy was performed  Diagnostic arthroscopy revealed left scapularis tendon to be intact  Upon inspection of the biceps tendon, erythema was noted at the biceps anchor with associated type II SLAP tear present  Degenerative fraying was noted of the anterior labrum  Glenohumeral articular surfaces were intact  Partial thickness undersurface tearing was noted of the anterior supraspinatus tendon  At this time a meniscal biter was inserted to release the biceps tendon  This was followed by an arthroscopic shaver used to debride the proximal biceps tendon stump, superior labrum, anterior labrum, undersurface of supraspinatus tear, and rotator interval including anterior capsule to reveal coracoid process  An 18 gauge spinal needle was inserted through the area of partial-thickness rotator cuff tear and marked with a PDS suture  Upon completion of arthroscopic work within the glenohumeral joint, the arthroscope was removed and attention was turned to biceps tenodesis  The patient was taken out of inline traction  A 15 blade was used to make a longitudinal incision over the inferior aspect of the pectoralis major tendon  Bovie cautery was used to achieve hemostasis  Metzenbaum scissors were used to dissect down to the inferior aspect of the pectoralis major tendon    Blunt dissection was performed inferior to the pectoralis major  The long head of the biceps tendon was palpated and retrieved from the surgical site  A whipstitch was placed from the musculotendinous junction extending proximally  Each limb of the whipstitch was then loaded into the biceps tenodesis button  Pectoralis major tendon was retracted superior laterally and short head of biceps was retracted medially to reveal the latissimus dorsi tendon  Bovie cautery was used to wally the appropriate site of tenodesis with the elbow in full extension  The near cortex was drilled and the tenodesis button was inserted to the far cortex and deployed  Sutures were tensioned with the elbow in full extension  1 limb of suture was then passed through the tendon using a free needle and a knot was tied to secure the tenodesis with the elbow in full extension  Surgical site was copiously irrigated  2-0 Vicryl was used fascia and subcutaneous closure  The patient was placed back into 10 pounds of inline traction and surgical gloves were changed  Attention was then turned to rotator cuff repair  Arthroscope was inserted via the posterior portal into the subacromial space  A lateral portal was made 3 cm off of the lateral aspect of the acromion in line with the rotator cuff tear  The arthroscopic shaver was inserted and a bursectomy was performed  Arthroscopic radiofrequency ablation device was used to remove soft tissue off the undersurface of the acromion  At this time, the arthroscopic camera was placed in the lateral portal and the remainder of the bursectomy was performed posteriorly  A  subacromial spur was visualized on the inferior aspect of the anterolateral acromion  An arthroscopic nas was used to perform an acromioplasty  Next, attention was turned to the rotator cuff tear  Previously marked PDS suture was used to identify the  Tear  Near full-thickness bursal sided tearing was appreciated    An accessory portal was made off of the lateral acromion and radiofrequency device was used to complete the rotator cuff tear, removing few residual fibers intact  Soft tissue was removed off the footprint on greater tuberosity  An arthroscopic shaver was used to lightly decorticate the rotator cuff footprint  The supraspinatus was properly released with arthroscopic shaver and mobilized to the appropriate point on the rotator cuff footprint without over tensioning  A tap was inserted followed by a 5 5 mm triple loaded BioComposite anchor centrally in the repair footprint  At this time sutures were passed through the rotator cuff tendon near the musculotendinous junction  Sutures were then subsequently tied, noting proper reduction and tensioning of the repair  Final repair was visualized and probed  Proper reduction and tensioning of the rotator cuff repair was confirmed and final arthroscopic pictures were taken  Arthroscopic incisions were closed with 3-0 nylon suture and tenodesis site was closed with 4-0 Monocryl  10 cc of 0 5% Marcaine local anesthetic were used at the tenodesis site  A sterile dressing was applied and the patient was placed in a sling and abduction pillow  The patient tolerated the procedure well and was transferred to the PACU without complication  I was present for the entire procedure , A qualified resident physician was not available  and A physician assistant, Elyse Funez PA-C, was required during the procedure for tissue retraction and handling, dissection, patient positioning, anchor insertion, assistance with arthroscopic camera and equipment due to the minimally invasive nature of the surgical case, and suturing      Patient Disposition:  PACU         SIGNATURE: Neva Griffin DO  DATE: June 9, 2023  TIME: 12:24 PM

## 2023-06-09 NOTE — ANESTHESIA POSTPROCEDURE EVALUATION
Post-Op Assessment Note    CV Status:  Stable  Pain Score: 1    Pain management: adequate     Mental Status:  Alert and awake   Hydration Status:  Euvolemic   PONV Controlled:  Controlled   Airway Patency:  Patent  Airway: intubated   Two or more mitigation strategies used for obstructive sleep apnea   Post Op Vitals Reviewed: Yes      Staff: CRNA         No notable events documented      BP   141/80   Temp 97 9   Pulse 78   Resp 18   SpO2 98% RA

## 2023-06-09 NOTE — INTERVAL H&P NOTE
H&P reviewed  After examining the patient I find no changes in the patients condition since the H&P had been written  Patient was seen and evaluated in the office where continued nonoperative vs surgical management of Left rotator cuff tear was discussed  In light of patients pain, weakness, MRI findings, difficulty with function and ADLs, patient would like to move forward with surgical intervention  Risks of surgical intervention discussed in the office with patient and detailed consent obtained  Patient will go to OR on 06/09/2023 with Dr Paola Herman for Left shoulder arthroscopic rotator cuff repair  14 point ROS in office   Musculoskeletal Left shoulder pain      Heart RRR  Lungs CTA BL  Abdomen Present, nontender  Left  Shoulder: Active range of motion   90 degrees forward flexion  90 degrees abduction  70 degrees external rotation   1 level restriction internal rotation    Passive range of motion   160 degrees of forward flexion   There is tenderness present over the biceps  There is 4/5 strength with supraspinatus testing  There is 4+/5 strength with infraspinatus testing  There is 4+/5 strength with subscapularis testing  Vitale test is positive  [place]'s test is positive   Speed's test is Negative  The patient is neurovascularly intact distally in the extremity          Vitals:    06/09/23 0915   BP: 146/73   Pulse: 70   Resp: 16   Temp: (!) 97 °F (36 1 °C)   SpO2: 97%

## 2023-06-15 ENCOUNTER — OFFICE VISIT (OUTPATIENT)
Dept: OBGYN CLINIC | Facility: CLINIC | Age: 48
End: 2023-06-15

## 2023-06-15 VITALS
WEIGHT: 193 LBS | BODY MASS INDEX: 27.02 KG/M2 | HEIGHT: 71 IN | DIASTOLIC BLOOD PRESSURE: 77 MMHG | SYSTOLIC BLOOD PRESSURE: 117 MMHG | HEART RATE: 70 BPM

## 2023-06-15 DIAGNOSIS — Z98.890 S/P ARTHROSCOPY OF LEFT SHOULDER: Primary | ICD-10-CM

## 2023-06-15 DIAGNOSIS — Z48.89 AFTERCARE FOLLOWING SURGERY: ICD-10-CM

## 2023-06-15 PROCEDURE — 99024 POSTOP FOLLOW-UP VISIT: CPT | Performed by: ORTHOPAEDIC SURGERY

## 2023-06-15 NOTE — PROGRESS NOTES
"Patient Name:  Justin Mandujano  MRN:  [id number]    [address] [place] [address] [place]     1  S/P arthroscopy of left shoulder  -     Ambulatory Referral to Physical Therapy; Future    2  Aftercare following surgery  -     Ambulatory Referral to Physical Therapy; Future      Approximately 6 day s/p Left shoulder arthroscopic rotator cuff repair, open subpectoral biceps tenodesis, acromioplasty  performed on 06/09/2023  · Sutures removed and intraoperative images reviewed  · Placed new PT script, post op RCR protocol and no resisted bicep flexion at this time  · Maintain sling at all times except for hygenic purposes and exercises  · Educated patient on home exercises including elbow ROM, pendulums, scapular squeezes and table slides  · Follow up in office in 4-5 weeks for reevaluation    History of the Present Illness   Justin Mandujano is a 46 y o  male approximately 6 day s/p Left shoulder arthroscopic rotator cuff repair, open subpectoral biceps tenodesis, acromioplasty  performed on 06/09/2023  Today, patient reports he is doing well s/p surgical intervention despite residual pain  He has been using the sling at all times and is feeling a little stiff  Review of Systems     Review of Systems   Constitutional: Negative for chills and fever  HENT: Negative for ear pain and sore throat  Eyes: Negative for pain and visual disturbance  Respiratory: Negative for cough and shortness of breath  Cardiovascular: Negative for chest pain and palpitations  Gastrointestinal: Negative for abdominal pain and vomiting  Genitourinary: Negative for dysuria and hematuria  Musculoskeletal: Negative for arthralgias and back pain  Skin: Negative for color change and rash  Neurological: Negative for seizures and syncope  All other systems reviewed and are negative        Physical Exam     /77 (BP Location: Right arm, Patient Position: Sitting, Cuff Size: Large)   Pulse 70   Ht 5' 11\" (1 803 m)   Wt 87 5 kg (193 lb)  " BMI 26 92 kg/m²     Left  Shoulder:   Surgical incisions well healing  Sutures removed  Active range of motion elbow   degrees      The patient is neurovascularly intact distally in the extremity  Data Review     I have personally reviewed pertinent films in PACS, and my interpretation follows  No new images     Social History     Tobacco Use   • Smoking status: Former     Packs/day:  00     Years: 20 00     Total pack years: 20 00     Types: Cigarettes     Quit date: 3/15/2021     Years since quittin 2   • Smokeless tobacco: Never   Vaping Use   • Vaping Use: Some days   • Substances: Nicotine   Substance Use Topics   • Alcohol use:  Yes     Alcohol/week: 2 0 standard drinks of alcohol     Types: 2 Cans of beer per week     Comment: on weekends   • Drug use: Never           Procedures  None     Allison Atkins PA-C

## 2023-06-19 ENCOUNTER — EVALUATION (OUTPATIENT)
Dept: PHYSICAL THERAPY | Facility: CLINIC | Age: 48
End: 2023-06-19
Payer: OTHER MISCELLANEOUS

## 2023-06-19 VITALS — HEART RATE: 62 BPM | DIASTOLIC BLOOD PRESSURE: 71 MMHG | SYSTOLIC BLOOD PRESSURE: 109 MMHG

## 2023-06-19 DIAGNOSIS — Z48.89 AFTERCARE FOLLOWING SURGERY: ICD-10-CM

## 2023-06-19 DIAGNOSIS — Z98.890 S/P ARTHROSCOPY OF LEFT SHOULDER: ICD-10-CM

## 2023-06-19 PROCEDURE — 97110 THERAPEUTIC EXERCISES: CPT | Performed by: PHYSICAL THERAPIST

## 2023-06-19 PROCEDURE — 97161 PT EVAL LOW COMPLEX 20 MIN: CPT | Performed by: PHYSICAL THERAPIST

## 2023-06-19 NOTE — PROGRESS NOTES
PT Evaluation     Today's date: 2023  Patient name: Georgie Duff  : 1975  MRN: [id number]  Referring provider: Maria Elena Kellogg DO  Dx:   Encounter Diagnosis     ICD-10-CM    1  S/P arthroscopy of left shoulder  Z98 890 Ambulatory Referral to Physical Therapy      2  Aftercare following surgery  Z48 89 Ambulatory Referral to Physical Therapy                     Assessment  Assessment details: Pt is a 45 y/o male who presents to physical therapy with primary nociceptive pain s/p left shoulder arthroscopic rotator cuff repair, open subpectoral biceps tenodesis, acromioplasty  performed on 19/10/4551 complicated by previous R shld surgical intervention  Pt does not present with any red flag symptoms at this time  He is doing well since surgery  PROM empty end feel today  Incisions healing well and no signs of infection  Education provided regarding POC, prognosis and HEP, pt verablized understanding  Pt would benefit from skilled physical therapy in order to decrease deficits and return to prior level of function  Impairments: abnormal or restricted ROM, activity intolerance, impaired physical strength and pain with function  Understanding of Dx/Px/POC: good  Goals  STG (6 weeks):  Pt will be independent with HEP  Pt will demonstrate increase in flexion PROM 140d  Pt will demonstrate abduction PROM 80d  LTG (12-16 weeks): FOTO will be expected outcome  Pt will demonstrate painfree flexion ROM comparable to contralateral limb  Pt will demonstrate MMT grade comparable to contralateral limb in all deficient muscle groups  Plan  Patient would benefit from: skilled physical therapy  Planned modality interventions: cryotherapy  Planned therapy interventions: manual therapy, neuromuscular re-education, patient education, self care, strengthening, stretching, therapeutic activities, therapeutic exercise and home exercise program  Frequency: 1-2x/week    Duration in weeks: 12  Treatment plan discussed with: patient        Subjective Evaluation    History of Present Illness  Mechanism of injury: Chief Complaint: Pt was seen at this facility prior to surgery for L shld pain  This occurred on 9/9/22 while at work and moving something heavy  At that time, MRI did not reveal any significant findings  He went through physical therapy which he was finding improvement with  He returned to work but on 4/17/2022 while adjusting a wench he felt a pop in his L shld  MRI revealed supraspinatus tear  He underwent a left shoulder arthroscopic rotator cuff repair, open subpectoral biceps tenodesis, acromioplasty  performed on 06/09/2023  He is now ten days out  Pain is controlled and he is not taking any pain medication at this time         24 hour Pattern: post-op  HPI: as above; R shld had 3 arthroscopic surgeries    P1: no N/T down the arm, just pain in the L shld that will sometimes run down the arm if he is out of the sling long enough  Aggs/eases: post-op    Physical Activity: None    Occupation:   GHS: None  Patient Goals  Patient goals for therapy: return to work, decreased edema, decreased pain, increased strength and increased motion          Objective     Observations     Additional Observation Details  Incision sites healing well, no signs of infection  Bruising in the post shld and down the bicep, none in the forearm today    Cervical/Thoracic Screen   Cervical range of motion within normal limits  Cervical range of motion within normal limits with the following exceptions: No symptoms with OP in any direction, did not test combined motions today    Active Range of Motion     Additional Active Range of Motion Details  Post-op    Passive Range of Motion   Left Shoulder   Flexion: 60 degrees   Abduction: 45 degrees     Additional Passive Range of Motion Details  A large amount of pain in this position and therefore did not complete much, however, able to get to 60d with empty end feel    Elbow: PROM flexion to 110d prior to onset of pulling in the bicep; PROM ext- able to achieve 0d but only after a little while of motion, not right out of the sling             Precautions: previous R shld surgeries    POC expires Auth Status Unit limit Start date  Expiration date PT/OT + Visit Limit?    9/11/23 pending 4 6/19/23 pending BOMN                                               Manuals 6/19            Phys mobs                                                    Neuro Re-Ed                                                                                                        Ther Ex             Pt edu TARUN            pendulums HEP            Elbow ROM             Wrist ROM                                                                 Ther Activity                                       Gait Training                                       Modalities

## 2023-06-21 ENCOUNTER — TELEPHONE (OUTPATIENT)
Dept: OBGYN CLINIC | Facility: HOSPITAL | Age: 48
End: 2023-06-21

## 2023-06-21 DIAGNOSIS — I10 ESSENTIAL HYPERTENSION: ICD-10-CM

## 2023-06-21 RX ORDER — AMLODIPINE BESYLATE 5 MG/1
TABLET ORAL
Qty: 30 TABLET | Refills: 0 | Status: SHIPPED | OUTPATIENT
Start: 2023-06-21

## 2023-06-21 NOTE — TELEPHONE ENCOUNTER
OV note with Dr Chelita Daniels 6/15  Faxed to [facility] / [facility]

## 2023-06-26 ENCOUNTER — OFFICE VISIT (OUTPATIENT)
Dept: PHYSICAL THERAPY | Facility: CLINIC | Age: 48
End: 2023-06-26
Payer: OTHER MISCELLANEOUS

## 2023-06-26 DIAGNOSIS — Z48.89 AFTERCARE FOLLOWING SURGERY: ICD-10-CM

## 2023-06-26 DIAGNOSIS — Z98.890 S/P ARTHROSCOPY OF LEFT SHOULDER: Primary | ICD-10-CM

## 2023-06-26 DIAGNOSIS — J45.909 UNCOMPLICATED ASTHMA, UNSPECIFIED ASTHMA SEVERITY, UNSPECIFIED WHETHER PERSISTENT: ICD-10-CM

## 2023-06-26 PROCEDURE — 97140 MANUAL THERAPY 1/> REGIONS: CPT | Performed by: PHYSICAL THERAPIST

## 2023-06-26 PROCEDURE — 97110 THERAPEUTIC EXERCISES: CPT | Performed by: PHYSICAL THERAPIST

## 2023-06-26 RX ORDER — ALBUTEROL SULFATE 90 UG/1
AEROSOL, METERED RESPIRATORY (INHALATION)
Qty: 54 G | Refills: 0 | Status: SHIPPED | OUTPATIENT
Start: 2023-06-26

## 2023-06-26 NOTE — PROGRESS NOTES
"Daily Note     Today's date: 2023  Patient name: Mitra Thompson  : 1975  MRN: [id number]  Referring provider: Juan Jon DO  Dx:   Encounter Diagnosis     ICD-10-CM    1  S/P arthroscopy of left shoulder  Z98 890       2  Aftercare following surgery  Z48 89                       Subjective: Pt reports his symptoms are getting better  Completing HEP as prescribed  Objective: See treatment diary below      Assessment: Tolerated treatment well  ROM flex ~70d, abd ~35d, ER ~8d  Elbow PROM still full ext  Patient would benefit from continued PT      Plan: Continue per plan of care  Progress treatment as tolerated  Precautions: previous R shld surgeries    POC expires Auth Status Unit limit Start date  Expiration date PT/OT + Visit Limit?    23 pending 4 23 pending BOMN                                               Manuals            Phys mobs  Flex, abd, ER TARUN gd 1-2                                                  Neuro Re-Ed                                                                                                        Ther Ex             Pt edu TARUN            pendulums HEP 20x A/P, M/L, CW/CCW           Elbow ROM  PROM 30x           Wrist ROM             Digiflex  Red 20x full, 10x ea           Web ext  10x           Scap retraction  10x10\"                        Ther Activity                                       Gait Training                                       Modalities                                            "

## 2023-06-28 ENCOUNTER — OFFICE VISIT (OUTPATIENT)
Dept: PHYSICAL THERAPY | Facility: CLINIC | Age: 48
End: 2023-06-28
Payer: OTHER MISCELLANEOUS

## 2023-06-28 DIAGNOSIS — Z98.890 S/P ARTHROSCOPY OF LEFT SHOULDER: Primary | ICD-10-CM

## 2023-06-28 DIAGNOSIS — Z48.89 AFTERCARE FOLLOWING SURGERY: ICD-10-CM

## 2023-06-28 PROCEDURE — 97110 THERAPEUTIC EXERCISES: CPT | Performed by: PHYSICAL THERAPIST

## 2023-06-28 PROCEDURE — 97140 MANUAL THERAPY 1/> REGIONS: CPT | Performed by: PHYSICAL THERAPIST

## 2023-06-28 NOTE — PROGRESS NOTES
"Daily Note     Today's date: 2023  Patient name: Camilo Ruiz  : 1975  MRN: [id number]  Referring provider: Ian Urrutia DO  Dx:   Encounter Diagnosis     ICD-10-CM    1  S/P arthroscopy of left shoulder  Z98 890       2  Aftercare following surgery  Z48 89                      Subjective: Pt reports that he has some R rib pain that he said began after reaching over his center console and heard a pop  He reports doing this before  He states that this is not as bad as the first time  Objective: See treatment diary below      Assessment: Tolerated treatment well  PROM increasing  No complaints of R rib pain during exercise  Patient would benefit from continued PT      Plan: Continue per plan of care  Progress treatment as tolerated  Precautions: previous R shld surgeries    POC expires Auth Status Unit limit Start date  Expiration date PT/OT + Visit Limit?    23 pending 4 23 pending BOMN                                               Manuals           Phys mobs  Flex, abd, ER TARUN gd 1-2 Flex, abd, ER TARUN gd 1-2                                                 Neuro Re-Ed                                                                                                        Ther Ex             Pt edu TARUN            pendulums HEP 20x A/P, M/L, CW/CCW           Elbow ROM  PROM 30x PROM 30x          Wrist ROM             Digiflex  Red 20x full, 10x ea Red 20x full, 10x          Web ext  10x 20x Yel          Scap retraction  10x10\" 10x10\" ret/liu                       Ther Activity                                       Gait Training                                       Modalities                                            "

## 2023-07-03 ENCOUNTER — OFFICE VISIT (OUTPATIENT)
Dept: PHYSICAL THERAPY | Facility: CLINIC | Age: 48
End: 2023-07-03
Payer: OTHER MISCELLANEOUS

## 2023-07-03 DIAGNOSIS — Z98.890 S/P ARTHROSCOPY OF LEFT SHOULDER: Primary | ICD-10-CM

## 2023-07-03 DIAGNOSIS — Z48.89 AFTERCARE FOLLOWING SURGERY: ICD-10-CM

## 2023-07-03 PROCEDURE — 97110 THERAPEUTIC EXERCISES: CPT | Performed by: PHYSICAL THERAPIST

## 2023-07-03 PROCEDURE — 97140 MANUAL THERAPY 1/> REGIONS: CPT | Performed by: PHYSICAL THERAPIST

## 2023-07-03 NOTE — PROGRESS NOTES
Daily Note     Today's date: 7/3/2023  Patient name: Esa Baez  : 1975  MRN: [id number]  Referring provider: Fernando Agrawal DO  Dx:   Encounter Diagnosis     ICD-10-CM    1. S/P arthroscopy of left shoulder  Z98.890       2. Aftercare following surgery  Z48.89                      Subjective: Pt offers no new complaints. Objective: See treatment diary below      Assessment: Tolerated treatment well. PROM improving. Staying within protocol at this time. Patient would benefit from continued PT      Plan: Continue per plan of care. Progress treatment as tolerated. Precautions: previous R shld surgeries    POC expires Auth Status Unit limit Start date  Expiration date PT/OT + Visit Limit?    23 pending 4 23 pending BOMN                                               Manuals 6/19 6/26 6/28 7/3         Phys mobs  Flex, abd, ER TARUN gd 1-2 Flex, abd, ER TARUN gd 1-2 Flex, abd, ER TARUN gd 1-2                                                Neuro Re-Ed                                                                                                        Ther Ex             Pt edu TARUN            pendulums HEP 20x A/P, M/L, CW/CCW  20x A/P, M/L, CW/CCW         Elbow ROM  PROM 30x PROM 30x PROM 30x         Wrist ROM             Digiflex  Red 20x full, 10x ea Red 20x full, 10x Green 20x full, 10x ea         Web ext  10x 20x Yel 20x yel         Scap retraction  10x10" 10x10" ret/liu 10x10" retract                      Ther Activity                                       Gait Training                                       Modalities

## 2023-07-06 ENCOUNTER — OFFICE VISIT (OUTPATIENT)
Dept: PHYSICAL THERAPY | Facility: CLINIC | Age: 48
End: 2023-07-06
Payer: OTHER MISCELLANEOUS

## 2023-07-06 DIAGNOSIS — Z98.890 S/P ARTHROSCOPY OF LEFT SHOULDER: Primary | ICD-10-CM

## 2023-07-06 DIAGNOSIS — Z48.89 AFTERCARE FOLLOWING SURGERY: ICD-10-CM

## 2023-07-06 PROCEDURE — 97110 THERAPEUTIC EXERCISES: CPT | Performed by: PHYSICAL THERAPIST

## 2023-07-06 PROCEDURE — 97140 MANUAL THERAPY 1/> REGIONS: CPT | Performed by: PHYSICAL THERAPIST

## 2023-07-06 NOTE — PROGRESS NOTES
Daily Note     Today's date: 2023  Patient name: Antonia Allred  : 1975  MRN: [id number]  Referring provider: Thu Kumar DO  Dx:   Encounter Diagnosis     ICD-10-CM    1. S/P arthroscopy of left shoulder  Z98.890       2. Aftercare following surgery  Z48.89                      Subjective: Pt reports he has been having some soreness in the post shoulder and lateral arm area. Objective: See treatment diary below      Assessment: Tolerated treatment well. Assessed cervical ROM, recreation of soreness/tightness with R cervical SB and R ROT and jt mobility testing to the C5 segment revealed reduced L to R sideglide at C5 and increased symptoms in the shoulder. Following jt mobs, 100% improvement in symptoms with sideglide, and no sx recreation with R SB and R ROT. Patient would benefit from continued PT      Plan: Continue per plan of care. Progress treatment as tolerated. Precautions: previous R shld surgeries    POC expires Auth Status Unit limit Start date  Expiration date PT/OT + Visit Limit?    23 pending 4 23 pending BOMN                                               Manuals 6/19 6/26 6/28 7/3 7/6        Phys mobs  Flex, abd, ER TARUN gd 1-2 Flex, abd, ER TARUN gd 1-2 Flex, abd, ER TARUN gd 1-2 Flex, abd, ER TARUN gd 1-2        Cervical side glide     L to R C5 cervical side glide 3x1' gd 2-4                                  Neuro Re-Ed                                                                                                        Ther Ex             Pt edu TARUN            pendulums HEP 20x A/P, M/L, CW/CCW  20x A/P, M/L, CW/CCW 30x A/P, M/L, CW/CCW        Elbow ROM  PROM 30x PROM 30x PROM 30x PROM 30x        Wrist ROM             Digiflex  Red 20x full, 10x ea Red 20x full, 10x Green 20x full, 10x ea Blue 20x full, 10x ea        Web ext  10x 20x Yel 20x yel 20x peach        Scap retraction  10x10" 10x10" ret/liu 10x10" retract 10x10" retract/ext                     Ther Activity Gait Training                                       Modalities

## 2023-07-10 ENCOUNTER — OFFICE VISIT (OUTPATIENT)
Dept: PHYSICAL THERAPY | Facility: CLINIC | Age: 48
End: 2023-07-10
Payer: OTHER MISCELLANEOUS

## 2023-07-10 DIAGNOSIS — Z48.89 AFTERCARE FOLLOWING SURGERY: ICD-10-CM

## 2023-07-10 DIAGNOSIS — Z98.890 S/P ARTHROSCOPY OF LEFT SHOULDER: Primary | ICD-10-CM

## 2023-07-10 PROCEDURE — 97110 THERAPEUTIC EXERCISES: CPT | Performed by: PHYSICAL THERAPIST

## 2023-07-10 PROCEDURE — 97140 MANUAL THERAPY 1/> REGIONS: CPT | Performed by: PHYSICAL THERAPIST

## 2023-07-10 NOTE — PROGRESS NOTES
Daily Note     Today's date: 7/10/2023  Patient name: Jossy Duckworth  : 1975  MRN: [id number]  Referring provider: Opal Alejo DO  Dx:   Encounter Diagnosis     ICD-10-CM    1. S/P arthroscopy of left shoulder  Z98.890       2. Aftercare following surgery  Z48.89                      Subjective: Pt reports the pain from last session is fully resolved. Objective: See treatment diary below      Assessment: Tolerated treatment well. Flexion ROM pt now is reporting some stiffness at end range. Some stretching into this today. Did not push too much in order to protect the surgery and limit pain following. Patient would benefit from continued PT      Plan: Continue per plan of care. Progress treatment as tolerated. Precautions: previous R shld surgeries    POC expires Auth Status Unit limit Start date  Expiration date PT/OT + Visit Limit?    23 45 visits 4 6/19/23 10/15/23 BOMN                                               Manuals 6/19 6/26 6/28 7/3 7/6 7/10       Phys mobs  Flex, abd, ER TARUN gd 1-2 Flex, abd, ER TARUN gd 1-2 Flex, abd, ER TARUN gd 1-2 Flex, abd, ER TARUN gd 1-2 Flex, abd, ER TARUN gd 1-2       Cervical side glide     L to R C5 cervical side glide 3x1' gd 2-4                                  Neuro Re-Ed                                                                                                        Ther Ex             Pt edu TARUN            pendulums HEP 20x A/P, M/L, CW/CCW  20x A/P, M/L, CW/CCW 30x A/P, M/L, CW/CCW 30x A/P, M/L, CW/CCW       Elbow ROM  PROM 30x PROM 30x PROM 30x PROM 30x PROM 30x       Wrist ROM             Digiflex  Red 20x full, 10x ea Red 20x full, 10x Green 20x full, 10x ea Blue 20x full, 10x ea Blue 20x full, 10x ea       Web ext  10x 20x Yel 20x yel 20x peach 20x peach       Scap retraction  10x10" 10x10" ret/liu 10x10" retract 10x10" retract/ext 10x10" retract/ext                    Ther Activity                                       Gait Training Modalities

## 2023-07-13 ENCOUNTER — OFFICE VISIT (OUTPATIENT)
Dept: PHYSICAL THERAPY | Facility: CLINIC | Age: 48
End: 2023-07-13
Payer: OTHER MISCELLANEOUS

## 2023-07-13 DIAGNOSIS — Z48.89 AFTERCARE FOLLOWING SURGERY: ICD-10-CM

## 2023-07-13 DIAGNOSIS — Z98.890 S/P ARTHROSCOPY OF LEFT SHOULDER: Primary | ICD-10-CM

## 2023-07-13 PROCEDURE — 97110 THERAPEUTIC EXERCISES: CPT | Performed by: PHYSICAL THERAPIST

## 2023-07-13 PROCEDURE — 97140 MANUAL THERAPY 1/> REGIONS: CPT | Performed by: PHYSICAL THERAPIST

## 2023-07-13 NOTE — PROGRESS NOTES
Daily Note     Today's date: 2023  Patient name: Alexandra Naidu  : 1975  MRN: [id number]  Referring provider: Tien Pickard DO  Dx:   Encounter Diagnosis     ICD-10-CM    1. S/P arthroscopy of left shoulder  Z98.890       2. Aftercare following surgery  Z48.89                      Subjective: Pt offers no new complaints. Objective: See treatment diary below      Assessment: Tolerated treatment well. PROM continuing to improve, 110d flex today. Focusing more on flex as pain is reducing. Patient would benefit from continued PT      Plan: Continue per plan of care. Progress treatment as tolerated. Precautions: previous R shld surgeries    POC expires Auth Status Unit limit Start date  Expiration date PT/OT + Visit Limit?    23 45 visits 4 6/19/23 10/15/23 BOMN                                               Manuals 6/19 6/26 6/28 7/3 7/6 7/10 7/13      Phys mobs  Flex, abd, ER TARUN gd 1-2 Flex, abd, ER TARUN gd 1-2 Flex, abd, ER TARUN gd 1-2 Flex, abd, ER TARUN gd 1-2 Flex, abd, ER TARUN gd 1-2 Flex gd 3, abd, ER TARUN gd 1-2      Cervical side glide     L to R C5 cervical side glide 3x1' gd 2-4                                  Neuro Re-Ed                                                                                                        Ther Ex             Pt edu TARUN            pendulums HEP 20x A/P, M/L, CW/CCW  20x A/P, M/L, CW/CCW 30x A/P, M/L, CW/CCW 30x A/P, M/L, CW/CCW 30x A/P, M/L, CW/CCW      Elbow ROM  PROM 30x PROM 30x PROM 30x PROM 30x PROM 30x AROM 30x      Wrist ROM             Digiflex  Red 20x full, 10x ea Red 20x full, 10x Green 20x full, 10x ea Blue 20x full, 10x ea Blue 20x full, 10x ea Blue 20x full, 10x ea      Web ext  10x 20x Yel 20x yel 20x peach 20x peach 20x peach      Scap retraction  10x10" 10x10" ret/liu 10x10" retract 10x10" retract/ext 10x10" retract/ext 10x10" retract/ext                   Ther Activity                                       Gait Training Modalities

## 2023-07-17 ENCOUNTER — OFFICE VISIT (OUTPATIENT)
Dept: PHYSICAL THERAPY | Facility: CLINIC | Age: 48
End: 2023-07-17
Payer: OTHER MISCELLANEOUS

## 2023-07-17 DIAGNOSIS — Z48.89 AFTERCARE FOLLOWING SURGERY: ICD-10-CM

## 2023-07-17 DIAGNOSIS — Z98.890 S/P ARTHROSCOPY OF LEFT SHOULDER: Primary | ICD-10-CM

## 2023-07-17 PROCEDURE — 97110 THERAPEUTIC EXERCISES: CPT | Performed by: PHYSICAL THERAPIST

## 2023-07-17 PROCEDURE — 97140 MANUAL THERAPY 1/> REGIONS: CPT | Performed by: PHYSICAL THERAPIST

## 2023-07-17 NOTE — PROGRESS NOTES
Daily Note     Today's date: 2023  Patient name: Zack Claire  : 1975  MRN: [id number]  Referring provider: Trang Valles DO  Dx:   Encounter Diagnosis     ICD-10-CM    1. S/P arthroscopy of left shoulder  Z98.890       2. Aftercare following surgery  Z48.89                      Subjective: Pt offers no new complaints. Objective: See treatment diary below      Assessment: Tolerated treatment well. Firm end feel noticed for flexion. Pain at end range currently. Improved today but still difficult. Patient would benefit from continued PT      Plan: Continue per plan of care. Progress treatment as tolerated. Precautions: previous R shld surgeries    POC expires Auth Status Unit limit Start date  Expiration date PT/OT + Visit Limit?    23 45 visits 4 6/19/23 10/15/23 BOMN                                               Manuals 6/19 6/26 6/28 7/3 7/6 7/10 7/13 7/17     Phys mobs  Flex, abd, ER TARUN gd 1-2 Flex, abd, ER TARUN gd 1-2 Flex, abd, ER TARUN gd 1-2 Flex, abd, ER TARUN gd 1-2 Flex, abd, ER TARUN gd 1-2 Flex gd 3, abd, ER TARUN gd 1-2 Flex gd 3, abd, ER TARUN gd 2-3     Cervical side glide     L to R C5 cervical side glide 3x1' gd 2-4                                  Neuro Re-Ed                                                                                                        Ther Ex             Pt edu TARUN            pendulums HEP 20x A/P, M/L, CW/CCW  20x A/P, M/L, CW/CCW 30x A/P, M/L, CW/CCW 30x A/P, M/L, CW/CCW 30x A/P, M/L, CW/CCW 30x A/P, M/L, CW/CCW     Elbow ROM  PROM 30x PROM 30x PROM 30x PROM 30x PROM 30x AROM 30x AROM 30x     Wrist ROM             Digiflex  Red 20x full, 10x ea Red 20x full, 10x Green 20x full, 10x ea Blue 20x full, 10x ea Blue 20x full, 10x ea Blue 20x full, 10x ea Blue 20x full, 10x ea     Web ext  10x 20x Yel 20x yel 20x peach 20x peach 20x peach 20x peach     Scap retraction  10x10" 10x10" ret/liu 10x10" retract 10x10" retract/ext 10x10" retract/ext 10x10" retract/ext 10x10" retract     Pulley's        2x15     Ther Activity                                       Gait Training                                       Modalities

## 2023-07-18 DIAGNOSIS — I10 ESSENTIAL HYPERTENSION: ICD-10-CM

## 2023-07-18 DIAGNOSIS — E78.2 MIXED HYPERLIPIDEMIA: ICD-10-CM

## 2023-07-18 RX ORDER — AMLODIPINE BESYLATE 5 MG/1
TABLET ORAL
Qty: 30 TABLET | Refills: 0 | Status: SHIPPED | OUTPATIENT
Start: 2023-07-18

## 2023-07-18 RX ORDER — AMLODIPINE BESYLATE 5 MG/1
TABLET ORAL
Qty: 30 TABLET | Refills: 0 | OUTPATIENT
Start: 2023-07-18

## 2023-07-19 RX ORDER — ROSUVASTATIN CALCIUM 40 MG/1
TABLET, COATED ORAL
Qty: 90 TABLET | Refills: 0 | Status: SHIPPED | OUTPATIENT
Start: 2023-07-19

## 2023-07-20 ENCOUNTER — EVALUATION (OUTPATIENT)
Dept: PHYSICAL THERAPY | Facility: CLINIC | Age: 48
End: 2023-07-20
Payer: OTHER MISCELLANEOUS

## 2023-07-20 DIAGNOSIS — Z98.890 S/P ARTHROSCOPY OF LEFT SHOULDER: Primary | ICD-10-CM

## 2023-07-20 DIAGNOSIS — Z48.89 AFTERCARE FOLLOWING SURGERY: ICD-10-CM

## 2023-07-20 PROCEDURE — 97110 THERAPEUTIC EXERCISES: CPT | Performed by: PHYSICAL THERAPIST

## 2023-07-20 PROCEDURE — 97140 MANUAL THERAPY 1/> REGIONS: CPT | Performed by: PHYSICAL THERAPIST

## 2023-07-20 NOTE — PROGRESS NOTES
PT Daily Note and Progress Note    Today's date: 2023  Patient name: Alexandra Naidu  : 1975  MRN: [id number]  Referring provider: Tien Pickard DO  Dx:   Encounter Diagnosis     ICD-10-CM    1. S/P arthroscopy of left shoulder  Z98.890 Ambulatory Referral to Physical Therapy      2. Aftercare following surgery  Z48.89 Ambulatory Referral to Physical Therapy                     Assessment  Assessment details: Pt is a 45 y/o male who presents to physical therapy with primary nociceptive pain s/p left shoulder arthroscopic rotator cuff repair, open subpectoral biceps tenodesis, acromioplasty  performed on  complicated by previous R shld surgical intervention. Pt progressing well and in line with protocol as he is closing in on 6 weeks post-op. No concerns at this point. Pt would benefit from skilled physical therapy in order to decrease deficits and return to prior level of function. Impairments: abnormal or restricted ROM, activity intolerance, impaired physical strength and pain with function  Understanding of Dx/Px/POC: good  Goals  STG (6 weeks):  Pt will be independent with HEP. - MET  Pt will demonstrate increase in flexion PROM 140d. - Ongoing  Pt will demonstrate abduction PROM 80d. - MET    LTG (12-16 weeks): - Ongoing  FOTO will be expected outcome. Pt will demonstrate painfree flexion ROM comparable to contralateral limb. Pt will demonstrate MMT grade comparable to contralateral limb in all deficient muscle groups. Plan  Patient would benefit from: skilled physical therapy  Planned modality interventions: cryotherapy  Planned therapy interventions: manual therapy, neuromuscular re-education, patient education, self care, strengthening, stretching, therapeutic activities, therapeutic exercise and home exercise program  Frequency: 1-2x/week.   Duration in weeks: 12  Treatment plan discussed with: patient        Subjective Evaluation    History of Present Illness  Mechanism of injury: Chief Complaint: Pt was seen at this facility prior to surgery for L shld pain. This occurred on 9/9/22 while at work and moving something heavy. At that time, MRI did not reveal any significant findings. He went through physical therapy which he was finding improvement with. He returned to work but on 4/17/2022 while adjusting a wench he felt a pop in his L shld. MRI revealed supraspinatus tear. He underwent a left shoulder arthroscopic rotator cuff repair, open subpectoral biceps tenodesis, acromioplasty  performed on 06/09/2023. He is now ten days out. Pain is controlled and he is not taking any pain medication at this time. Re (7/20): Pt reports some soreness following last session as ROM was pushed more and pulley's were added. He reports overall that his shoulder is improving as he would expect.        24 hour Pattern: post-op  HPI: as above; R shld had 3 arthroscopic surgeries    P1: no N/T down the arm, just pain in the L shld that will sometimes run down the arm if he is out of the sling long enough  Aggs/eases: post-op    Physical Activity: None    Occupation:   GHS: None  Patient Goals  Patient goals for therapy: return to work, decreased edema, decreased pain, increased strength and increased motion          Objective     Observations     Additional Observation Details  Incision sites healing well, no signs of infection  Bruising in the post shld and down the bicep, none in the forearm today    Cervical/Thoracic Screen   Cervical range of motion within normal limits  Cervical range of motion within normal limits with the following exceptions: No symptoms with OP in any direction, did not test combined motions today    Active Range of Motion     Additional Active Range of Motion Details  Post-op    Passive Range of Motion   Left Shoulder   Flexion: 60 degrees; Re (7/20): 122d  Abduction: 45 degrees; Re (7/20): 80d  External Rotation: Re (7/20): 32d    Additional Passive Range of Motion Details  A large amount of pain in this position and therefore did not complete much, however, able to get to 60d with empty end feel    Elbow: Full ROM      Precautions: previous R shld surgeries    Access code: [email or web address]       POC expires Auth Status Unit limit Start date  Expiration date PT/OT + Visit Limit?    9/11/23 45 visits 4 6/19/23 10/15/23 BOMN                                           Manuals 6/19 6/26 6/28 7/3 7/6 7/10 7/13 7/17 7/20    Phys mobs  Flex, abd, ER TARUN gd 1-2 Flex, abd, ER TARUN gd 1-2 Flex, abd, ER TARUN gd 1-2 Flex, abd, ER TARUN gd 1-2 Flex, abd, ER TARUN gd 1-2 Flex gd 3, abd, ER TARUN gd 1-2 Flex gd 3, abd, ER TARUN gd 2-3 Flex gd 3, abd, ER TARUN gd 2-3    Cervical side glide     L to R C5 cervical side glide 3x1' gd 2-4                                  Neuro Re-Ed                                                                                                        Ther Ex             Pt edu TARUN            pendulums HEP 20x A/P, M/L, CW/CCW  20x A/P, M/L, CW/CCW 30x A/P, M/L, CW/CCW 30x A/P, M/L, CW/CCW 30x A/P, M/L, CW/CCW 30x A/P, M/L, CW/CCW 30x A/P, M/L, CW/CCW    Elbow ROM  PROM 30x PROM 30x PROM 30x PROM 30x PROM 30x AROM 30x AROM 30x     Wrist ROM             Digiflex  Red 20x full, 10x ea Red 20x full, 10x Green 20x full, 10x ea Blue 20x full, 10x ea Blue 20x full, 10x ea Blue 20x full, 10x ea Blue 20x full, 10x ea Blue 20x full, 10x ea    Web ext  10x 20x Yel 20x yel 20x peach 20x peach 20x peach 20x peach 20x peach    Scap retraction  10x10" 10x10" ret/liu 10x10" retract 10x10" retract/ext 10x10" retract/ext 10x10" retract/ext 10x10" retract 10x10" retract    Cane ER         10x10"    Pulley's        2x15 2x10    Ther Activity                                       Gait Training                                       Modalities

## 2023-07-24 ENCOUNTER — OFFICE VISIT (OUTPATIENT)
Dept: PHYSICAL THERAPY | Facility: CLINIC | Age: 48
End: 2023-07-24
Payer: OTHER MISCELLANEOUS

## 2023-07-24 DIAGNOSIS — Z98.890 S/P ARTHROSCOPY OF LEFT SHOULDER: Primary | ICD-10-CM

## 2023-07-24 DIAGNOSIS — Z48.89 AFTERCARE FOLLOWING SURGERY: ICD-10-CM

## 2023-07-24 PROCEDURE — 97140 MANUAL THERAPY 1/> REGIONS: CPT

## 2023-07-24 PROCEDURE — 97110 THERAPEUTIC EXERCISES: CPT

## 2023-07-24 NOTE — PROGRESS NOTES
Daily Note     Today's date: 2023  Patient name: Aye Ragland  : 1975  MRN: [id number]  Referring provider: Kimberly Li DO  Dx:   Encounter Diagnosis     ICD-10-CM    1. S/P arthroscopy of left shoulder  Z98.890       2. Aftercare following surgery  Z48.89                      Subjective: Patient reports no new complaints this visit. Objective: See treatment diary below      Assessment: Tolerated treatment well. Tolerated PROM and exercises well without adverse effects or increase in pain. Patient exhibited good technique with therapeutic exercises and would benefit from continued PT      Plan: Continue per plan of care. Progress treatment as tolerated. Precautions: previous R shld surgeries    POC expires Auth Status Unit limit Start date  Expiration date PT/OT + Visit Limit?    23 45 visits 4 6/19/23 10/15/23 BOMN                                               Manuals 6/19 6/26 6/28 7/3 7/6 7/10 7/13 7/17 7/24    Phys mobs  Flex, abd, ER TARUN gd 1-2 Flex, abd, ER TARUN gd 1-2 Flex, abd, ER TARUN gd 1-2 Flex, abd, ER TARUN gd 1-2 Flex, abd, ER TARUN gd 1-2 Flex gd 3, abd, ER TARUN gd 1-2 Flex gd 3, abd, ER TARUN gd 2-3 Done SD    Cervical side glide     L to R C5 cervical side glide 3x1' gd 2-4                                  Neuro Re-Ed                                                                                                        Ther Ex             Pt edu TARUN            pendulums HEP 20x A/P, M/L, CW/CCW  20x A/P, M/L, CW/CCW 30x A/P, M/L, CW/CCW 30x A/P, M/L, CW/CCW 30x A/P, M/L, CW/CCW 30x A/P, M/L, CW/CCW 30s AP, ML, CW, CCW    Elbow ROM  PROM 30x PROM 30x PROM 30x PROM 30x PROM 30x AROM 30x AROM 30x AROM 30x    Wrist ROM             Digiflex  Red 20x full, 10x ea Red 20x full, 10x Green 20x full, 10x ea Blue 20x full, 10x ea Blue 20x full, 10x ea Blue 20x full, 10x ea Blue 20x full, 10x ea Blue 20x full 10x ea    Web ext  10x 20x Yel 20x yel 20x peach 20x peach 20x peach 20x peach 20x peach    Scap retraction  10x10" 10x10" ret/liu 10x10" retract 10x10" retract/ext 10x10" retract/ext 10x10" retract/ext 10x10" retract 10s x 10 retract    Pulley's        2x15 2x15    Ther Activity                                       Gait Training                                       Modalities

## 2023-07-26 ENCOUNTER — OFFICE VISIT (OUTPATIENT)
Dept: PHYSICAL THERAPY | Facility: CLINIC | Age: 48
End: 2023-07-26
Payer: OTHER MISCELLANEOUS

## 2023-07-26 DIAGNOSIS — Z48.89 AFTERCARE FOLLOWING SURGERY: ICD-10-CM

## 2023-07-26 DIAGNOSIS — Z98.890 S/P ARTHROSCOPY OF LEFT SHOULDER: Primary | ICD-10-CM

## 2023-07-26 PROCEDURE — 97140 MANUAL THERAPY 1/> REGIONS: CPT

## 2023-07-26 PROCEDURE — 97110 THERAPEUTIC EXERCISES: CPT

## 2023-07-26 NOTE — PROGRESS NOTES
Daily Note     Today's date: 2023  Patient name: Bob Sykes  : 1975  MRN: [id number]  Referring provider: Bj Rogel DO  Dx:   Encounter Diagnosis     ICD-10-CM    1. S/P arthroscopy of left shoulder  Z98.890       2. Aftercare following surgery  Z48.89                      Subjective: Patient reports some soreness in shoulder pre treatment. Objective: See treatment diary below      Assessment: Tolerated treatment well. Progressing well as per protocol. Patient exhibited good technique with therapeutic exercises and would benefit from continued PT      Plan: Continue per plan of care. Progress treament per protocol. Precautions: previous R shld surgeries    POC expires Auth Status Unit limit Start date  Expiration date PT/OT + Visit Limit?    23 45 visits 4 6/19/23 10/15/23 BOMN                                               Manuals 6/19 6/26 6/28 7/3 7/6 7/10 7/13 7/17 7/24 7/26   Phys mobs  Flex, abd, ER TARUN gd 1-2 Flex, abd, ER TARUN gd 1-2 Flex, abd, ER TARUN gd 1-2 Flex, abd, ER TARUN gd 1-2 Flex, abd, ER TARUN gd 1-2 Flex gd 3, abd, ER TARUN gd 1-2 Flex gd 3, abd, ER TARUN gd 2-3 Done SD Done SD   Cervical side glide     L to R C5 cervical side glide 3x1' gd 2-4                                  Neuro Re-Ed                                                                                                        Ther Ex             Pt edu TARUN            pendulums HEP 20x A/P, M/L, CW/CCW  20x A/P, M/L, CW/CCW 30x A/P, M/L, CW/CCW 30x A/P, M/L, CW/CCW 30x A/P, M/L, CW/CCW 30x A/P, M/L, CW/CCW 30s AP, ML, CW, CCW 30x AP, ML, CW, CCW   Elbow ROM  PROM 30x PROM 30x PROM 30x PROM 30x PROM 30x AROM 30x AROM 30x AROM 30x AROM 30x   Wrist ROM             Digiflex  Red 20x full, 10x ea Red 20x full, 10x Green 20x full, 10x ea Blue 20x full, 10x ea Blue 20x full, 10x ea Blue 20x full, 10x ea Blue 20x full, 10x ea Blue 20x full 10x ea Blue 20x   Full 10x ea   Web ext  10x 20x Yel 20x yel 20x peach 20x peach 20x peach 20x peach 20x peach 20x peach   Scap retraction  10x10" 10x10" ret/liu 10x10" retract 10x10" retract/ext 10x10" retract/ext 10x10" retract/ext 10x10" retract 10s x 10 retract 10s x 10 retract   Pulley's        2x15 2x15 2x15   Ther Activity                                       Gait Training                                       Modalities

## 2023-07-27 ENCOUNTER — OFFICE VISIT (OUTPATIENT)
Dept: OBGYN CLINIC | Facility: CLINIC | Age: 48
End: 2023-07-27

## 2023-07-27 VITALS
BODY MASS INDEX: 27.02 KG/M2 | DIASTOLIC BLOOD PRESSURE: 86 MMHG | HEIGHT: 71 IN | WEIGHT: 193 LBS | HEART RATE: 94 BPM | SYSTOLIC BLOOD PRESSURE: 138 MMHG

## 2023-07-27 DIAGNOSIS — Z98.890 S/P ARTHROSCOPY OF LEFT SHOULDER: Primary | ICD-10-CM

## 2023-07-27 DIAGNOSIS — Z48.89 AFTERCARE FOLLOWING SURGERY: ICD-10-CM

## 2023-07-27 PROCEDURE — 99024 POSTOP FOLLOW-UP VISIT: CPT | Performed by: ORTHOPAEDIC SURGERY

## 2023-07-27 NOTE — LETTER
July 27, 2023     Patient: Jazlyn Shaw  YOB: 1975  Date of Visit: 7/27/2023      To Whom it May Concern:    Jazlyn Shaw is under my professional care. Richie Green was seen in my office on 7/27/2023. Richie Green is to remain out of work until re-evaluated in approximately 6 weeks. If you have any questions or concerns, please don't hesitate to call.          Sincerely,          Corina Hdz,         CC: No Recipients

## 2023-07-27 NOTE — PROGRESS NOTES
Patient Name:  Aye Ragland  MRN:  [id number]    [address] [address]. S/P arthroscopy of left shoulder    2. Aftercare following surgery        Approximately 7 weeks s/p Left shoulder arthroscopic rotator cuff repair, open subpectoral biceps tenodesis, acromioplasty  performed on 06/09/2023  He may discontinue his sling at this time. Continue with physical therapy and progress per protocol as tolerated. Focus on restoring near end range of motion. Follow-up in 6 weeks for re-evaluation. History of the Present Illness   Aye Ragland is a 46 y.o. male approximately 7 weeks s/p Left shoulder arthroscopic rotator cuff repair, open subpectoral biceps tenodesis, acromioplasty  performed on 06/09/2023. The patient is doing well. He continues to attend physical therapy and utilize his post-operative sling. Review of Systems     Review of Systems   Constitutional: Negative for appetite change and unexpected weight change. HENT: Negative for congestion and trouble swallowing. Eyes: Negative for visual disturbance. Respiratory: Negative for cough and shortness of breath. Cardiovascular: Negative for chest pain and palpitations. Gastrointestinal: Negative for nausea and vomiting. Endocrine: Negative for cold intolerance and heat intolerance. Musculoskeletal: Negative for joint swelling and myalgias. Skin: Negative for rash. Neurological: Negative for numbness. Physical Exam     /86   Pulse 94   Ht 5' 11" (1.803 m)   Wt 87.5 kg (193 lb)   BMI 26.92 kg/m²     Left  Shoulder:   Surgical incisions well healed without signs of infection  Active range of motion of elbow and wrist well maintained  Passive range of motion   95 degrees of forward flexion   The patient is neurovascularly intact distally in the extremity. Data Review     I have personally reviewed pertinent films in PACS, and my interpretation follows. No new images today.      Social History     Tobacco Use • Smoking status: Former     Packs/day: 1.00     Years: 20.00     Total pack years: 20.00     Types: Cigarettes     Quit date: 3/15/2021     Years since quittin.3   • Smokeless tobacco: Never   Vaping Use   • Vaping Use: Some days   • Substances: Nicotine   Substance Use Topics   • Alcohol use: Yes     Alcohol/week: 2.0 standard drinks of alcohol     Types: 2 Cans of beer per week     Comment: on weekends   • Drug use: Never           Procedures  None performed.      Bhargav Mcgarry   Scribe Attestation    I,:  Bhargav Mcgarry am acting as a scribe while in the presence of the attending physician.:       I,:  Stephanie Eckert DO personally performed the services described in this documentation    as scribed in my presence.:

## 2023-07-31 ENCOUNTER — OFFICE VISIT (OUTPATIENT)
Dept: PHYSICAL THERAPY | Facility: CLINIC | Age: 48
End: 2023-07-31
Payer: OTHER MISCELLANEOUS

## 2023-07-31 DIAGNOSIS — Z98.890 S/P ARTHROSCOPY OF LEFT SHOULDER: Primary | ICD-10-CM

## 2023-07-31 DIAGNOSIS — Z48.89 AFTERCARE FOLLOWING SURGERY: ICD-10-CM

## 2023-07-31 PROCEDURE — 97140 MANUAL THERAPY 1/> REGIONS: CPT | Performed by: PHYSICAL THERAPIST

## 2023-07-31 PROCEDURE — 97110 THERAPEUTIC EXERCISES: CPT | Performed by: PHYSICAL THERAPIST

## 2023-07-31 NOTE — PROGRESS NOTES
Daily Note     Today's date: 2023  Patient name: Sophie Wallace  : 1975  MRN: [id number]  Referring provider: Greg Long DO  Dx:   Encounter Diagnosis     ICD-10-CM    1. S/P arthroscopy of left shoulder  Z98.890       2. Aftercare following surgery  Z48.89                      Subjective: Pt reporting that his shoulder has been more sore since getting it out of the sling. Objective: See treatment diary below      Assessment: Tolerated treatment fair. Largely pain dominant at this time for ROM as no firm end feel attained today with ROM. Slowly adding in AAROM as able. Patient would benefit from continued PT      Plan: Continue per plan of care. Progress treatment as tolerated. Precautions: previous R shld surgeries    POC expires Auth Status Unit limit Start date  Expiration date PT/OT + Visit Limit?    23 45 visits 4 6/19/23 10/15/23 BOMN                                               Manuals 7/31 6/26 6/28 7/3 7/6 7/10 7/13 7/17 7/24 7/26   Phys mobs Flex, abd ER, gd 3 TARUN Flex, abd, ER TARUN gd 1-2 Flex, abd, ER TARUN gd 1-2 Flex, abd, ER TARUN gd 1-2 Flex, abd, ER TARUN gd 1-2 Flex, abd, ER TARUN gd 1-2 Flex gd 3, abd, ER TARUN gd 1-2 Flex gd 3, abd, ER TARUN gd 2-3 Done SD Done SD   Cervical side glide     L to R C5 cervical side glide 3x1' gd 2-4                                  Neuro Re-Ed                                                                                                        Ther Ex             Pt edu             pendulums  20x A/P, M/L, CW/CCW  20x A/P, M/L, CW/CCW 30x A/P, M/L, CW/CCW 30x A/P, M/L, CW/CCW 30x A/P, M/L, CW/CCW 30x A/P, M/L, CW/CCW 30s AP, ML, CW, CCW 30x AP, ML, CW, CCW   Elbow ROM  PROM 30x PROM 30x PROM 30x PROM 30x PROM 30x AROM 30x AROM 30x AROM 30x AROM 30x   Wrist ROM             Digiflex  Red 20x full, 10x ea Red 20x full, 10x Green 20x full, 10x ea Blue 20x full, 10x ea Blue 20x full, 10x ea Blue 20x full, 10x ea Blue 20x full, 10x ea Blue 20x full 10x ea Blue 20x   Full 10x ea   Web ext  10x 20x Yel 20x yel 20x peach 20x peach 20x peach 20x peach 20x peach 20x peach   Scap retraction  10x10" 10x10" ret/liu 10x10" retract 10x10" retract/ext 10x10" retract/ext 10x10" retract/ext 10x10" retract 10s x 10 retract 10s x 10 retract   Cane ER 2x15            Cane flex 2x10                         Pulley's 2x15       2x15 2x15 2x15   Ther Activity                                       Gait Training                                       Modalities

## 2023-08-03 ENCOUNTER — OFFICE VISIT (OUTPATIENT)
Dept: PHYSICAL THERAPY | Facility: CLINIC | Age: 48
End: 2023-08-03
Payer: OTHER MISCELLANEOUS

## 2023-08-03 DIAGNOSIS — Z98.890 S/P ARTHROSCOPY OF LEFT SHOULDER: Primary | ICD-10-CM

## 2023-08-03 DIAGNOSIS — Z48.89 AFTERCARE FOLLOWING SURGERY: ICD-10-CM

## 2023-08-03 PROCEDURE — 97140 MANUAL THERAPY 1/> REGIONS: CPT | Performed by: PHYSICAL THERAPIST

## 2023-08-03 PROCEDURE — 97110 THERAPEUTIC EXERCISES: CPT | Performed by: PHYSICAL THERAPIST

## 2023-08-03 NOTE — PROGRESS NOTES
Daily Note     Today's date: 8/3/2023  Patient name: Mariangel Monroy  : 1975  MRN: [id number]  Referring provider: Lynn Jovel DO  Dx:   Encounter Diagnosis     ICD-10-CM    1. S/P arthroscopy of left shoulder  Z98.890       2. Aftercare following surgery  Z48.89                      Subjective: Pt reports his shoulder has been sore since treatment on Monday. He states that it is relatively constant right now. He is unsure of what is increasing his symptoms. He does report that he has been consistent with HEP, maybe slightly more painful after but not noticeable when he is doing exercise. He states that when he thinks back to his therapy for his other shoulder, he remembers it being sore as it is now. Objective: See treatment diary below      Assessment: Tolerated treatment fair. Toned it back due to soreness following last session. Noted ER the most painful today and therefore deferred this ROM and told pt to stop doing until next visit. Patient would benefit from continued PT      Plan: Continue per plan of care. Progress treatment as tolerated. Precautions: previous R shld surgeries    POC expires Auth Status Unit limit Start date  Expiration date PT/OT + Visit Limit?    23 45 visits 4 6/19/23 10/15/23 BOMN                                               Manuals 7/31 8/3 6/28 7/3 7/6 7/10 7/13 7/17 7/24 7/26   Phys mobs Flex, abd ER, gd 3 TARUN Flex, abd TARUN gd 1-2 Flex, abd, ER TARUN gd 1-2 Flex, abd, ER TARUN gd 1-2 Flex, abd, ER TARUN gd 1-2 Flex, abd, ER TARUN gd 1-2 Flex gd 3, abd, ER TARUN gd 1-2 Flex gd 3, abd, ER TARUN gd 2-3 Done SD Done SD   Cervical side glide     L to R C5 cervical side glide 3x1' gd 2-4                                  Neuro Re-Ed                                                                                                        Ther Ex             Pt edu             pendulums  30x A/P, M/L, CW/CCW  20x A/P, M/L, CW/CCW 30x A/P, M/L, CW/CCW 30x A/P, M/L, CW/CCW 30x A/P, M/L, CW/CCW 30x A/P, M/L, CW/CCW 30s AP, ML, CW, CCW 30x AP, ML, CW, CCW   Elbow ROM   PROM 30x PROM 30x PROM 30x PROM 30x AROM 30x AROM 30x AROM 30x AROM 30x   Wrist ROM             Digiflex  Blue 20x full, 10x ea Red 20x full, 10x Green 20x full, 10x ea Blue 20x full, 10x ea Blue 20x full, 10x ea Blue 20x full, 10x ea Blue 20x full, 10x ea Blue 20x full 10x ea Blue 20x   Full 10x ea   Web ext  10x 20x Yel 20x yel 20x peach 20x peach 20x peach 20x peach 20x peach 20x peach   Scap retraction  10x10" 10x10" ret/liu 10x10" retract 10x10" retract/ext 10x10" retract/ext 10x10" retract/ext 10x10" retract 10s x 10 retract 10s x 10 retract   Cane ER 2x15 deferred           Cane flex 2x10 deferred                        Pulley's 2x15 2x15      2x15 2x15 2x15   Ther Activity                                       Gait Training                                       Modalities

## 2023-08-07 ENCOUNTER — OFFICE VISIT (OUTPATIENT)
Dept: PHYSICAL THERAPY | Facility: CLINIC | Age: 48
End: 2023-08-07
Payer: OTHER MISCELLANEOUS

## 2023-08-07 DIAGNOSIS — Z98.890 S/P ARTHROSCOPY OF LEFT SHOULDER: Primary | ICD-10-CM

## 2023-08-07 DIAGNOSIS — Z48.89 AFTERCARE FOLLOWING SURGERY: ICD-10-CM

## 2023-08-07 PROCEDURE — 97140 MANUAL THERAPY 1/> REGIONS: CPT | Performed by: PHYSICAL THERAPIST

## 2023-08-07 PROCEDURE — 97110 THERAPEUTIC EXERCISES: CPT | Performed by: PHYSICAL THERAPIST

## 2023-08-07 NOTE — PROGRESS NOTES
Daily Note     Today's date: 2023  Patient name: Hailey Poole  : 1975  MRN: [id number]  Referring provider: Adriana Bay DO  Dx:   Encounter Diagnosis     ICD-10-CM    1. S/P arthroscopy of left shoulder  Z98.890       2. Aftercare following surgery  Z48.89                      Subjective: Pt reports that his soreness       Objective: See treatment diary below      Assessment: Tolerated treatment well. Able to progress back up to more substantial stretching today. Added back in more shld AAROM. Pt tolerated it all well, no significant increase in soreness. Patient would benefit from continued PT      Plan: Continue per plan of care. Progress treatment as tolerated. Precautions: previous R shld surgeries    POC expires Auth Status Unit limit Start date  Expiration date PT/OT + Visit Limit?    23 45 visits 4 6/19/23 10/15/23 BOMN                                               Manuals 7/31 8/3 6/28 7/3 7/6 7/10 7/13 7/17 7/24 7/26   Phys mobs Flex, abd ER, gd 3 TARUN Flex, abd TARUN gd 1-2 Flex, abd, ER TARUN gd 1-2 Flex, abd, ER TARUN gd 1-2 Flex, abd, ER TARUN gd 1-2 Flex, abd, ER TARUN gd 1-2 Flex gd 3, abd, ER TARUN gd 1-2 Flex gd 3, abd, ER TARUN gd 2-3 Done SD Done SD   Cervical side glide     L to R C5 cervical side glide 3x1' gd 2-4                                  Neuro Re-Ed                                                                                                        Ther Ex             Pt edu             pendulums  30x A/P, M/L, CW/CCW  20x A/P, M/L, CW/CCW 30x A/P, M/L, CW/CCW 30x A/P, M/L, CW/CCW 30x A/P, M/L, CW/CCW 30x A/P, M/L, CW/CCW 30s AP, ML, CW, CCW 30x AP, ML, CW, CCW   Elbow ROM    PROM 30x PROM 30x PROM 30x AROM 30x AROM 30x AROM 30x AROM 30x   Wrist ROM             Digiflex  Blue 20x full, 10x ea  Green 20x full, 10x ea Blue 20x full, 10x ea Blue 20x full, 10x ea Blue 20x full, 10x ea Blue 20x full, 10x ea Blue 20x full 10x ea Blue 20x   Full 10x ea   Web ext  10x  20x yel 20x peach 20x peach 20x peach 20x peach 20x peach 20x peach   Scap retraction  10x10"  10x10" retract 10x10" retract/ext 10x10" retract/ext 10x10" retract/ext 10x10" retract 10s x 10 retract 10s x 10 retract   Cane ER 2x15 deferred           Cane flex 2x10 deferred 2x10 in supine - press first set flexion second set          Standing cane flexion   2x10           Finger ladder             Pulley's 2x15 2x15 2x15     2x15 2x15 2x15   Ther Activity                                       Gait Training                                       Modalities

## 2023-08-08 ENCOUNTER — TELEPHONE (OUTPATIENT)
Age: 48
End: 2023-08-08

## 2023-08-08 NOTE — TELEPHONE ENCOUNTER
Caller: Alisia lloyd/ ASIM MICHAEL    Doctor/Office:     CB#: n/a      What needs to be faxed: Mariella Rogel from 7/27/23     ATTN to: Alisia Castro     Fax#: [phone]      Documents were successfully e-faxed

## 2023-08-10 ENCOUNTER — OFFICE VISIT (OUTPATIENT)
Dept: PHYSICAL THERAPY | Facility: CLINIC | Age: 48
End: 2023-08-10
Payer: OTHER MISCELLANEOUS

## 2023-08-10 DIAGNOSIS — Z98.890 S/P ARTHROSCOPY OF LEFT SHOULDER: Primary | ICD-10-CM

## 2023-08-10 DIAGNOSIS — Z48.89 AFTERCARE FOLLOWING SURGERY: ICD-10-CM

## 2023-08-10 PROCEDURE — 97110 THERAPEUTIC EXERCISES: CPT | Performed by: PHYSICAL THERAPIST

## 2023-08-10 PROCEDURE — 97140 MANUAL THERAPY 1/> REGIONS: CPT | Performed by: PHYSICAL THERAPIST

## 2023-08-10 NOTE — PROGRESS NOTES
Daily Note     Today's date: 8/10/2023  Patient name: Karsten Deutsch  : 1975  MRN: [id number]  Referring provider: Arsalan Wallace DO  Dx:   Encounter Diagnosis     ICD-10-CM    1. S/P arthroscopy of left shoulder  Z98.890       2. Aftercare following surgery  Z48.89                      Subjective: Pt offers no new complaints. Objective: See treatment diary below      Assessment: Tolerated treatment well. (+) nerve irritation noted. Lat glide improved symptoms with flexion. Patient would benefit from continued PT      Plan: Continue per plan of care. Progress treatment as tolerated. Precautions: previous R shld surgeries    POC expires Auth Status Unit limit Start date  Expiration date PT/OT + Visit Limit?    23 45 visits 4 6/19/23 10/15/23 BOMN                                               Manuals 7/31 8/3 6/28 7/3 7/6 7/10 7/13 7/17 7/24 7/26   Phys mobs Flex, abd ER, gd 3 TARUN Flex, abd TARUN gd 1-2 Flex, abd, ER TARUN gd 1-2 Flex, abd, ER TARUN gd 1-2 Flex, abd, ER TARUN gd 1-2 Flex, abd, ER TARUN gd 1-2 Flex gd 3, abd, ER TARUN gd 1-2 Flex gd 3, abd, ER TARUN gd 2-3 Done SD Done SD   Cervical side glide    L to R c5 cervical side glide 3x1' gd 4 L to R C5 cervical side glide 3x1' gd 2-4                                  Neuro Re-Ed                                                                                                        Ther Ex             Pt edu             pendulums  30x A/P, M/L, CW/CCW   30x A/P, M/L, CW/CCW 30x A/P, M/L, CW/CCW 30x A/P, M/L, CW/CCW 30x A/P, M/L, CW/CCW 30s AP, ML, CW, CCW 30x AP, ML, CW, CCW   Elbow ROM     PROM 30x PROM 30x AROM 30x AROM 30x AROM 30x AROM 30x   Wrist ROM             Digiflex  Blue 20x full, 10x ea   Blue 20x full, 10x ea Blue 20x full, 10x ea Blue 20x full, 10x ea Blue 20x full, 10x ea Blue 20x full 10x ea Blue 20x   Full 10x ea   Web ext  10x   20x peach 20x peach 20x peach 20x peach 20x peach 20x peach   Scap retraction  10x10"   10x10" retract/ext 10x10" retract/ext 10x10" retract/ext 10x10" retract 10s x 10 retract 10s x 10 retract   Cane ER 2x15 deferred  20x         Cane flex 2x10 deferred 2x10 in supine - press first set flexion second set 20x         Standing cane flexion   2x10           Finger ladder             Pulley's 2x15 2x15 2x15     2x15 2x15 2x15   Ther Activity                                       Gait Training                                       Modalities

## 2023-08-14 ENCOUNTER — OFFICE VISIT (OUTPATIENT)
Dept: PHYSICAL THERAPY | Facility: CLINIC | Age: 48
End: 2023-08-14
Payer: OTHER MISCELLANEOUS

## 2023-08-14 DIAGNOSIS — Z98.890 S/P ARTHROSCOPY OF LEFT SHOULDER: Primary | ICD-10-CM

## 2023-08-14 DIAGNOSIS — I10 ESSENTIAL HYPERTENSION: ICD-10-CM

## 2023-08-14 DIAGNOSIS — Z48.89 AFTERCARE FOLLOWING SURGERY: ICD-10-CM

## 2023-08-14 PROCEDURE — 97140 MANUAL THERAPY 1/> REGIONS: CPT | Performed by: PHYSICAL THERAPIST

## 2023-08-14 PROCEDURE — 97110 THERAPEUTIC EXERCISES: CPT | Performed by: PHYSICAL THERAPIST

## 2023-08-14 RX ORDER — AMLODIPINE BESYLATE 5 MG/1
5 TABLET ORAL DAILY
Qty: 30 TABLET | Refills: 0 | Status: SHIPPED | OUTPATIENT
Start: 2023-08-14

## 2023-08-14 NOTE — PROGRESS NOTES
Daily Note     Today's date: 2023  Patient name: Tania Winslow  : 1975  MRN: [id number]  Referring provider: Natasha Doyle DO  Dx:   Encounter Diagnosis     ICD-10-CM    1. S/P arthroscopy of left shoulder  Z98.890       2. Aftercare following surgery  Z48.89                      Subjective: Pt reports that he has still been sore. Objective: See treatment diary below      Assessment: Tolerated treatment well. Following desensitization to the scar, able to complete PROM to firm end feel today without reports of pain in the previous area. Educated on how to complete at home. Patient would benefit from continued PT      Plan: Continue per plan of care. Progress treatment as tolerated. Precautions: previous R shld surgeries    POC expires Auth Status Unit limit Start date  Expiration date PT/OT + Visit Limit?    23 45 visits 4 6/19/23 10/15/23 BOMN                                               Manuals 7/31 8/3 6/28 8/14 7/6 7/10 7/13 7/17 7/24 7/26   Phys mobs Flex, abd ER, gd 3 TARUN Flex, abd TARUN gd 1-2 Flex, abd, ER TARUN gd 1-2 Flex, abd, ER close to 90/90 TARUN gd 2-3 Flex, abd, ER  TARUN gd 1-2 Flex, abd, ER TARUN gd 1-2 Flex gd 3, abd, ER TARUN gd 1-2 Flex gd 3, abd, ER TARUN gd 2-3 Done SD Done SD   Cervical side glide     L to R C5 cervical side glide 3x1' gd 2-4        Scar mob    desensitization 5'                      Neuro Re-Ed                                                                                                        Ther Ex             Pt edu             pendulums  30x A/P, M/L, CW/CCW   30x A/P, M/L, CW/CCW 30x A/P, M/L, CW/CCW 30x A/P, M/L, CW/CCW 30x A/P, M/L, CW/CCW 30s AP, ML, CW, CCW 30x AP, ML, CW, CCW   Elbow ROM     PROM 30x PROM 30x AROM 30x AROM 30x AROM 30x AROM 30x   Wrist ROM             Digiflex  Blue 20x full, 10x ea   Blue 20x full, 10x ea Blue 20x full, 10x ea Blue 20x full, 10x ea Blue 20x full, 10x ea Blue 20x full 10x ea Blue 20x   Full 10x ea   Web ext  10x 20x peach 20x peach 20x peach 20x peach 20x peach 20x peach   Scap retraction  10x10"   10x10" retract/ext 10x10" retract/ext 10x10" retract/ext 10x10" retract 10s x 10 retract 10s x 10 retract   Cane ER 2x15 deferred           Cane flex 2x10 deferred 2x10 in supine - press first set flexion second set 2x10 flexion         Standing cane flexion   2x10  2x10         Finger ladder             Pulley's 2x15 2x15 2x15 4'    2x15 2x15 2x15   Ther Activity                                       Gait Training                                       Modalities

## 2023-08-24 ENCOUNTER — OFFICE VISIT (OUTPATIENT)
Dept: PHYSICAL THERAPY | Facility: CLINIC | Age: 48
End: 2023-08-24
Payer: OTHER MISCELLANEOUS

## 2023-08-24 DIAGNOSIS — Z98.890 S/P ARTHROSCOPY OF LEFT SHOULDER: Primary | ICD-10-CM

## 2023-08-24 DIAGNOSIS — Z48.89 AFTERCARE FOLLOWING SURGERY: ICD-10-CM

## 2023-08-24 PROCEDURE — 97140 MANUAL THERAPY 1/> REGIONS: CPT | Performed by: PHYSICAL THERAPIST

## 2023-08-24 PROCEDURE — 97110 THERAPEUTIC EXERCISES: CPT | Performed by: PHYSICAL THERAPIST

## 2023-08-24 NOTE — PROGRESS NOTES
Daily Note     Today's date: 2023  Patient name: Tristan Krueger  : 1975  MRN: [id number]  Referring provider: Kandace Caceres DO  Dx:   Encounter Diagnosis     ICD-10-CM    1. S/P arthroscopy of left shoulder  Z98.890       2. Aftercare following surgery  Z48.89                      Subjective: Pt reports that his ant shld pain continues to be there and he has instances of the shoulder ceasing up on him, he is unsure of any movement or activity that directly causes it. Objective: See treatment diary below      Assessment: Tolerated treatment well. Pain in incision area reduced with scar mob again today. Pt had instance of shld locking up with PROM below 90d abd. Therefore, stopped, it subsided within 1 minute. Did not go back to this again. He stated that Patient would benefit from continued PT      Plan: Continue per plan of care. Progress treatment as tolerated. Precautions: previous R shld surgeries    POC expires Auth Status Unit limit Start date  Expiration date PT/OT + Visit Limit?    23 45 visits 4 6/19/23 10/15/23 BOMN                                               Manuals 7/31 8/3 6/28 8/14 8/24 7/10 7/13 7/17 7/24 7/26   Phys mobs Flex, abd ER, gd 3 TARUN Flex, abd TARUN gd 1-2 Flex, abd, ER TARUN gd 1-2 Flex, abd, ER close to 90/90 TARUN gd 2-3 Flex gd 3-4, abd (deferred), ER  TARUN gd 3 Flex, abd, ER TARUN gd 1-2 Flex gd 3, abd, ER TARUN gd 1-2 Flex gd 3, abd, ER TARUN gd 2-3 Done SD Done SD   Cervical side glide             Scar mob    desensitization 5' TARUN 3'                     Neuro Re-Ed                                                                                                        Ther Ex             Pt edu             pendulums  30x A/P, M/L, CW/CCW    30x A/P, M/L, CW/CCW 30x A/P, M/L, CW/CCW 30x A/P, M/L, CW/CCW 30s AP, ML, CW, CCW 30x AP, ML, CW, CCW   Elbow ROM      PROM 30x AROM 30x AROM 30x AROM 30x AROM 30x   Wrist ROM             Digiflex  Blue 20x full, 10x ea    Blue 20x full, 10x ea Blue 20x full, 10x ea Blue 20x full, 10x ea Blue 20x full 10x ea Blue 20x   Full 10x ea   Web ext  10x    20x peach 20x peach 20x peach 20x peach 20x peach   Scap retraction  10x10"    10x10" retract/ext 10x10" retract/ext 10x10" retract 10s x 10 retract 10s x 10 retract   Cane ER 2x15 deferred           Cane flex 2x10 deferred 2x10 in supine - press first set flexion second set 2x10 flexion 2x10 flexion        Standing cane flexion   2x10  2x10 2x10         Standing cane abduction     2x10        Finger ladder             Pulley's 2x15 2x15 2x15 4' 3'   2x15 2x15 2x15   Ther Activity                                       Gait Training                                       Modalities

## 2023-08-29 DIAGNOSIS — E78.1 HYPERTRIGLYCERIDEMIA: ICD-10-CM

## 2023-08-29 DIAGNOSIS — E78.2 MIXED HYPERLIPIDEMIA: ICD-10-CM

## 2023-08-29 RX ORDER — FENOFIBRIC ACID 135 MG/1
1 CAPSULE, DELAYED RELEASE ORAL DAILY
Qty: 90 CAPSULE | Refills: 0 | Status: SHIPPED | OUTPATIENT
Start: 2023-08-29

## 2023-08-31 ENCOUNTER — APPOINTMENT (OUTPATIENT)
Dept: PHYSICAL THERAPY | Facility: CLINIC | Age: 48
End: 2023-08-31
Payer: OTHER MISCELLANEOUS

## 2023-09-06 ENCOUNTER — EVALUATION (OUTPATIENT)
Dept: PHYSICAL THERAPY | Facility: CLINIC | Age: 48
End: 2023-09-06
Payer: OTHER MISCELLANEOUS

## 2023-09-06 DIAGNOSIS — Z48.89 AFTERCARE FOLLOWING SURGERY: ICD-10-CM

## 2023-09-06 DIAGNOSIS — Z98.890 S/P ARTHROSCOPY OF LEFT SHOULDER: Primary | ICD-10-CM

## 2023-09-06 PROCEDURE — 97140 MANUAL THERAPY 1/> REGIONS: CPT | Performed by: PHYSICAL THERAPIST

## 2023-09-06 PROCEDURE — 97110 THERAPEUTIC EXERCISES: CPT | Performed by: PHYSICAL THERAPIST

## 2023-09-06 NOTE — PROGRESS NOTES
PT Re-evaluation    Today's date: 2023  Patient name: Fatmata Stewart  : 1975  MRN: [id number]  Referring provider: Kelin Green DO  Dx:   Encounter Diagnosis     ICD-10-CM    1. S/P arthroscopy of left shoulder  Z98.890 Ambulatory Referral to Physical Therapy      2. Aftercare following surgery  Z48.89 Ambulatory Referral to Physical Therapy                     Assessment  Assessment details: Pt is a 45 y/o male who presents to physical therapy with primary nociceptive pain s/p left shoulder arthroscopic rotator cuff repair, open subpectoral biceps tenodesis, acromioplasty  performed on  complicated by previous R shld surgical intervention. He continues to improve however, progression has been slow per protocol. Limitations mainly due to pain. Pt also experiencing fits where the shoulder pain is significant in abduction and he is unable to let it come down to his side. He reports this sensation has been getting somewhat better but is still there. Avoided abduction today as this brings on the symptoms and he will discuss with Dr. Darrius Marshall tomorrow. Otherwise, all objective measurements continue to improve. Initiated strengthening today to what the patient can tolerate, updated HEP. Pt would benefit from skilled physical therapy in order to decrease deficits and return to prior level of function. Impairments: abnormal or restricted ROM, activity intolerance, impaired physical strength and pain with function  Understanding of Dx/Px/POC: good  Goals  STG (6 weeks):  Pt will be independent with HEP. - MET  Pt will demonstrate increase in flexion PROM 140d. - MET  Pt will demonstrate abduction PROM 80d. - MET    LTG (12-16 weeks): - Ongoing  FOTO will be expected outcome. Pt will demonstrate painfree flexion ROM comparable to contralateral limb. Pt will demonstrate MMT grade comparable to contralateral limb in all deficient muscle groups.       Plan  Patient would benefit from: skilled physical therapy  Planned modality interventions: cryotherapy  Planned therapy interventions: manual therapy, neuromuscular re-education, patient education, self care, strengthening, stretching, therapeutic activities, therapeutic exercise and home exercise program  Frequency: 1-2x/week. Duration in weeks: 8  Treatment plan discussed with: patient        Subjective Evaluation    History of Present Illness  Mechanism of injury: Chief Complaint: Pt was seen at this facility prior to surgery for L shld pain. This occurred on 9/9/22 while at work and moving something heavy. At that time, MRI did not reveal any significant findings. He went through physical therapy which he was finding improvement with. He returned to work but on 4/17/2022 while adjusting a wench he felt a pop in his L shld. MRI revealed supraspinatus tear. He underwent a left shoulder arthroscopic rotator cuff repair, open subpectoral biceps tenodesis, acromioplasty  performed on 06/09/2023. He is now ten days out. Pain is controlled and he is not taking any pain medication at this time. Re (7/20): Pt reports some soreness following last session as ROM was pushed more and pulley's were added. He reports overall that his shoulder is improving as he would expect. RE (9/6): Pt reports he feels it is getting better, however, he is still lacking strength. Also he reports still having instances of shoulder causing large amounts of pain and difficulty bringing it back down (always in abduction).        24 hour Pattern: post-op  HPI: as above; R shld had 3 arthroscopic surgeries    P1: no N/T down the arm, just pain in the L shld that will sometimes run down the arm if he is out of the sling long enough  Aggs/eases: post-op    Physical Activity: None    Occupation:   GHS: None  Patient Goals  Patient goals for therapy: return to work, decreased edema, decreased pain, increased strength and increased motion          Objective     Observations Additional Observation Details  Incision sites healing well, no signs of infection  Bruising in the post shld and down the bicep, none in the forearm today    Cervical/Thoracic Screen   Cervical range of motion within normal limits  Cervical range of motion within normal limits with the following exceptions: No symptoms with OP in any direction, did not test combined motions today    Active Range of Motion     Flexion: 90d  Abduction: 70d  ER: 20d  Ext: 31d    Passive Range of Motion   Left Shoulder   Flexion: 60 degrees; Re (7/20): 122d; RE (9/6): 140d  Abduction: 45 degrees; Re (7/20): 80d; RE (9/6)  External Rotation: Re (7/20): 32d; RE (9/6): 42d at 0d; at 60d-35d      Elbow: Full ROM    Strength testing:    Not warranted at this time due to significant restriction AROM       Precautions: previous R shld surgeries    Access code: [email or web address]       POC expires Auth Status Unit limit Start date  Expiration date PT/OT + Visit Limit?    9/11/23 45 visits 4 6/19/23 10/15/23 BOMN                                         Manuals 7/31 8/3 6/28 8/14 8/24 9/6       Phys mobs Flex, abd ER, gd 3 TARUN Flex, abd TARUN gd 1-2 Flex, abd, ER TARUN gd 1-2 Flex, abd, ER close to 90/90 TARUN gd 2-3 Flex gd 3-4, abd (deferred), ER  TARUN gd 3 Flex gd 3 ER       Cervical side glide             Scar mob    desensitization 5' TARUN 3'                     Neuro Re-Ed             TB row      2x15 RTB                                                                                     Ther Ex             Pt edu             pendulums  30x A/P, M/L, CW/CCW           Elbow ROM             Wrist ROM             Digiflex  Blue 20x full, 10x ea           Web ext  10x           Scap retraction  10x10"           Cane ER 2x15 deferred           Cane flex 2x10 deferred 2x10 in supine - press first set flexion second set 2x10 flexion 2x10 flexion 2x10 flexion       Standing cane flexion   2x10  2x10 2x10  2x10        Standing cane abduction     2x10 2x10        Finger ladder      10x       Pulley's 2x15 2x15 2x15 4' 3' 4'       Ther Activity                                       Gait Training                                       Modalities

## 2023-09-07 ENCOUNTER — OFFICE VISIT (OUTPATIENT)
Dept: OBGYN CLINIC | Facility: CLINIC | Age: 48
End: 2023-09-07

## 2023-09-07 VITALS
SYSTOLIC BLOOD PRESSURE: 129 MMHG | WEIGHT: 190.2 LBS | BODY MASS INDEX: 26.63 KG/M2 | HEIGHT: 71 IN | DIASTOLIC BLOOD PRESSURE: 81 MMHG | HEART RATE: 80 BPM

## 2023-09-07 DIAGNOSIS — Z98.890 S/P ARTHROSCOPY OF LEFT SHOULDER: Primary | ICD-10-CM

## 2023-09-07 DIAGNOSIS — Z48.89 AFTERCARE FOLLOWING SURGERY: ICD-10-CM

## 2023-09-07 PROCEDURE — 99024 POSTOP FOLLOW-UP VISIT: CPT | Performed by: ORTHOPAEDIC SURGERY

## 2023-09-07 NOTE — PROGRESS NOTES
Patient Name:  Ashtyn Bradley  MRN:  [id number]    [id number] [facility]     1. S/P arthroscopy of left shoulder    2. Aftercare following surgery      Approximately 13 week s/p Left shoulder arthroscopic rotator cuff repair, open subpectoral biceps tenodesis, acromioplasty, extensive debridement performed on 06/09/2023  · Overall, patient progressing with range of motion with persistent stiffness despite outpatient PT. Placed order for [facility] flexionater for improvement of range of motion. · Continue with outpatient PT to work on passive and active range of motion, postural strengthening while following post operative protocol  · Encouraged OTC medications as needed for pain relief during PT  · Work note provided as patient is not clear to return to work at this time. · Follow up in 2 months for reevaluation of Left shoulder      History of the Present Illness   Ashtyn Bradley is a 46 y.o. male approximately 13 week s/p Left shoulder arthroscopic rotator cuff repair, open subpectoral biceps tenodesis, acromioplasty, extensive debridement performed on 06/09/2023. Today, patient reports he is doing okay. He admits to stiffness with overhead motions and pain with abduction. He admits to "catching" of his muscles when performing abduction exercises. He admits this started when PT increased his motion. He also admits to some "stretching" at the surgical incision at his biceps. He does admit to feeling a little better since before surgery and notices incremental improvements. He does not take anything when he is having pain. Review of Systems     Review of Systems   Constitutional: Negative for chills and fever. HENT: Negative for ear pain and sore throat. Eyes: Negative for pain and visual disturbance. Respiratory: Negative for cough and shortness of breath. Cardiovascular: Negative for chest pain and palpitations. Gastrointestinal: Negative for abdominal pain and vomiting.    Genitourinary: Negative for dysuria and hematuria. Musculoskeletal: Negative for arthralgias and back pain. Skin: Negative for color change and rash. Neurological: Negative for seizures and syncope. All other systems reviewed and are negative. Physical Exam     /81   Pulse 80   Ht 5' 11" (1.803 m)   Wt 86.3 kg (190 lb 3.2 oz)   BMI 26.53 kg/m²     Left Shoulder:   Surgical incisions well healed  Active range of motion   110 degrees forward flexion  90 degrees abduction  20-30 degrees external rotation   SI joint internal rotation    Passive range of motion   130 degrees of forward flexion   Supraspinatus testing 4+/5 and improving  Infraspinatus testing 4+/5 and improving  The patient is neurovascularly intact distally in the extremity. Data Review     I have personally reviewed pertinent films in PACS, and my interpretation follows. No new images     Social History     Tobacco Use   • Smoking status: Former     Packs/day: 1.00     Years: 20.00     Total pack years: 20.00     Types: Cigarettes     Quit date: 3/15/2021     Years since quittin.4   • Smokeless tobacco: Never   Vaping Use   • Vaping Use: Some days   • Substances: Nicotine   Substance Use Topics   • Alcohol use:  Yes     Alcohol/week: 2.0 standard drinks of alcohol     Types: 2 Cans of beer per week     Comment: on weekends   • Drug use: Never           Procedures  None     Ellie Pal PA-C

## 2023-09-07 NOTE — LETTER
September 7, 2023     Patient: Debbi De León  YOB: 1975  Date of Visit: 9/7/2023      To Whom it May Concern:    Debbi De León is under my professional care. Tamia Alvarado was seen in my office on 9/7/2023. Tamia Alvarado may not return to work at this time. He will follow up in office in 2 months for reevaluation and new work note will be provided at that time. If you have any questions or concerns, please don't hesitate to call.          Sincerely,          Ellie Pal DO        CC: No Recipients

## 2023-09-08 ENCOUNTER — OFFICE VISIT (OUTPATIENT)
Dept: PHYSICAL THERAPY | Facility: CLINIC | Age: 48
End: 2023-09-08
Payer: OTHER MISCELLANEOUS

## 2023-09-08 DIAGNOSIS — Z48.89 AFTERCARE FOLLOWING SURGERY: ICD-10-CM

## 2023-09-08 DIAGNOSIS — Z98.890 S/P ARTHROSCOPY OF LEFT SHOULDER: Primary | ICD-10-CM

## 2023-09-08 PROCEDURE — 97110 THERAPEUTIC EXERCISES: CPT | Performed by: PHYSICAL THERAPIST

## 2023-09-08 PROCEDURE — 97140 MANUAL THERAPY 1/> REGIONS: CPT | Performed by: PHYSICAL THERAPIST

## 2023-09-08 NOTE — PROGRESS NOTES
Daily Note     Today's date: 2023  Patient name: Jesus Lopez  : 1975  MRN: [id number]  Referring provider: Caro Nascimento DO  Dx:   Encounter Diagnosis     ICD-10-CM    1. S/P arthroscopy of left shoulder  Z98.890       2. Aftercare following surgery  Z48.89                      Subjective: Pt reports MD happy with progress. Somewhat sore from ROM at MD's yesterday. Objective: See treatment diary below      Assessment: Tolerated treatment well. Progressing ROM and strength. Added bicep strengthening. Patient would benefit from continued PT      Plan: Continue per plan of care. Progress treatment as tolerated. Precautions: previous R shld surgeries    POC expires Auth Status Unit limit Start date  Expiration date PT/OT + Visit Limit?    23 45 visits 4 6/19/23 10/15/23 BOMN                                         Manuals 7/31 8/3 6/28 8/14 8/24 9/6 9/8      Phys mobs Flex, abd ER, gd 3 TARUN Flex, abd TARUN gd 1-2 Flex, abd, ER TARUN gd 1-2 Flex, abd, ER close to 90/90 TARUN gd 2-3 Flex gd 3-4, abd (deferred), ER  TARUN gd 3 Flex gd 3 ER Flex and ER gd 3      Cervical side glide             Scar mob    desensitization 5' TARUN 3'                     Neuro Re-Ed             TB row      2x15 RTB 2x15 RTB                                                                                    Ther Ex             Pt edu             pendulums  30x A/P, M/L, CW/CCW           Elbow ROM             Wrist ROM             Digiflex  Blue 20x full, 10x ea           Web ext  10x           Scap retraction  10x10"           Cane ER 2x15 deferred           Cane flex 2x10 deferred 2x10 in supine - press first set flexion second set 2x10 flexion 2x10 flexion 2x10 flexion       Standing cane flexion   2x10  2x10 2x10  2x10  2x15      Standing cane abduction     2x10 2x10  2x15      Finger ladder      10x 10x      Bicep curl       3# 2x20      Pulley's 2x15 2x15 2x15 4' 3' 4' 4'      Ther Activity Gait Training                                       Modalities

## 2023-09-11 ENCOUNTER — OFFICE VISIT (OUTPATIENT)
Dept: PHYSICAL THERAPY | Facility: CLINIC | Age: 48
End: 2023-09-11
Payer: OTHER MISCELLANEOUS

## 2023-09-11 DIAGNOSIS — Z98.890 S/P ARTHROSCOPY OF LEFT SHOULDER: Primary | ICD-10-CM

## 2023-09-11 DIAGNOSIS — Z48.89 AFTERCARE FOLLOWING SURGERY: ICD-10-CM

## 2023-09-11 PROCEDURE — 97140 MANUAL THERAPY 1/> REGIONS: CPT | Performed by: PHYSICAL THERAPIST

## 2023-09-11 PROCEDURE — 97110 THERAPEUTIC EXERCISES: CPT | Performed by: PHYSICAL THERAPIST

## 2023-09-11 NOTE — PROGRESS NOTES
Daily Note     Today's date: 2023  Patient name: Osmel Richard  : 1975  MRN: [id number]  Referring provider: Tay Meyers DO  Dx:   Encounter Diagnosis     ICD-10-CM    1. S/P arthroscopy of left shoulder  Z98.890       2. Aftercare following surgery  Z48.89                      Subjective: Pt offers no new complaints. Objective: See treatment diary below      Assessment: Tolerated treatment well. Pt doing well. Less pain with PROM and better able to complete standing abduction today with increased motion by the end. Patient would benefit from continued PT      Plan: Continue per plan of care. Progress treatment as tolerated. Precautions: previous R shld surgeries    POC expires Auth Status Unit limit Start date  Expiration date PT/OT + Visit Limit?    23 45 visits 4 6/19/23 10/15/23 BOMN                                         Manuals 7/31 8/3 6/28 8/14 8/24 9/6 9/8 9/11     Phys mobs Flex, abd ER, gd 3 TARUN Flex, abd TARUN gd 1-2 Flex, abd, ER TARUN gd 1-2 Flex, abd, ER close to 90/90 TARUN gd 2-3 Flex gd 3-4, abd (deferred), ER  TARUN gd 3 Flex gd 3 ER Flex and ER gd 3 Flex and ER gd 3     Cervical side glide             Scar mob    desensitization 5' TARUN 3'                     Neuro Re-Ed             TB row      2x15 RTB 2x15 RTB 2x25 RTB     TB shld ext        2x25 RTB                                                                      Ther Ex             Pt edu             pendulums  30x A/P, M/L, CW/CCW           Elbow ROM             Wrist ROM             Digiflex  Blue 20x full, 10x ea           Web ext  10x           Scap retraction  10x10"           Cane ER 2x15 deferred           Cane flex 2x10 deferred 2x10 in supine - press first set flexion second set 2x10 flexion 2x10 flexion 2x10 flexion       Standing cane flexion   2x10  2x10 2x10  2x10  2x15 2x20     Standing cane abduction     2x10 2x10  2x15 2x20     Finger ladder      10x 10x 20x     Bicep curl       3# 2x20 3# 2x20 Skull crushers        2# 2x20     Pulley's 2x15 2x15 2x15 4' 3' 4' 4' 4'     Ther Activity                                       Gait Training                                       Modalities

## 2023-09-12 DIAGNOSIS — I10 ESSENTIAL HYPERTENSION: ICD-10-CM

## 2023-09-12 RX ORDER — AMLODIPINE BESYLATE 5 MG/1
5 TABLET ORAL DAILY
Qty: 30 TABLET | Refills: 0 | Status: SHIPPED | OUTPATIENT
Start: 2023-09-12

## 2023-09-14 ENCOUNTER — OFFICE VISIT (OUTPATIENT)
Dept: PHYSICAL THERAPY | Facility: CLINIC | Age: 48
End: 2023-09-14
Payer: OTHER MISCELLANEOUS

## 2023-09-14 ENCOUNTER — TELEPHONE (OUTPATIENT)
Age: 48
End: 2023-09-14

## 2023-09-14 DIAGNOSIS — Z98.890 S/P ARTHROSCOPY OF LEFT SHOULDER: Primary | ICD-10-CM

## 2023-09-14 DIAGNOSIS — Z48.89 AFTERCARE FOLLOWING SURGERY: ICD-10-CM

## 2023-09-14 PROCEDURE — 97110 THERAPEUTIC EXERCISES: CPT | Performed by: PHYSICAL THERAPIST

## 2023-09-14 PROCEDURE — 97140 MANUAL THERAPY 1/> REGIONS: CPT | Performed by: PHYSICAL THERAPIST

## 2023-09-14 NOTE — PROGRESS NOTES
Daily Note     Today's date: 2023  Patient name: Aniceto Hernandez  : 1975  MRN: [id number]  Referring provider: Shelley Garcia DO  Dx:   Encounter Diagnosis     ICD-10-CM    1. S/P arthroscopy of left shoulder  Z98.890       2. Aftercare following surgery  Z48.89                      Subjective: Pt reports that he had distal medial upper arm pain and what he believed was some swelling that began later that evening after last visit. He reports that this has improved since onset but is still there. Objective: See treatment diary below      Assessment: Tolerated treatment well. All exercises completed and ROM and strength improved despite the recent increase in pain. PROM improving as well. Patient would benefit from continued PT      Plan: Continue per plan of care. Progress treatment as tolerated. Precautions: previous R shld surgeries    POC expires Auth Status Unit limit Start date  Expiration date PT/OT + Visit Limit?    23 45 visits 4 6/19/23 10/15/23 BOMN                                         Manuals 7/31 8/3 6/28 8/14 8/24 9/6 9/8 9/11 9/14    Phys mobs Flex, abd ER, gd 3 TARUN Flex, abd TARUN gd 1-2 Flex, abd, ER TARUN gd 1-2 Flex, abd, ER close to [address] gd 2-3 Flex gd 3-4, abd (deferred), ER  TARUN gd 3 Flex gd 3 ER Flex and ER gd 3 Flex and ER gd 3 Flex and ER gd 3    Cervical side glide             Scar mob    desensitization 5' TARUN 3'                     Neuro Re-Ed             TB row      2x15 RTB 2x15 RTB 2x25 RTB 2x25 GTB    TB shld ext        2x25 RTB 2x25 GTB                                                                     Ther Ex             Pt edu             pendulums  30x A/P, M/L, CW/CCW           Elbow ROM             Wrist ROM             Digiflex  Blue 20x full, 10x ea           Web ext  10x           Scap retraction  10x10"           Cane ER 2x15 deferred           Cane flex 2x10 deferred 2x10 in supine - press first set flexion second set 2x10 flexion 2x10 flexion 2x10 flexion       Standing cane flexion   2x10  2x10 2x10  2x10  2x15 2x20 2x25    Standing cane abduction     2x10 2x10  2x15 2x20 2x25    Finger ladder      10x 10x 20x 25x    Bicep curl       3# 2x20 3# 2x20 3# 2x25    Skull crushers        2# 2x20 3# 2x25    scap protraction in supine         4# 2x25                 Pulley's 2x15 2x15 2x15 4' 3' 4' 4' 4' 4'    Ther Activity                                       Gait Training                                       Modalities

## 2023-09-14 NOTE — TELEPHONE ENCOUNTER
Caller: Sarah Castanon from [facility]    Doctor/Office: Dr. Kristine Corral    #:       What needs to be faxed: Shruti Marr and work letter from 9/7/23.     SARAH Castanon    Fax#: [phone] were successfully e-faxed

## 2023-09-18 ENCOUNTER — OFFICE VISIT (OUTPATIENT)
Dept: PHYSICAL THERAPY | Facility: CLINIC | Age: 48
End: 2023-09-18
Payer: OTHER MISCELLANEOUS

## 2023-09-18 DIAGNOSIS — G47.00 INSOMNIA, UNSPECIFIED TYPE: ICD-10-CM

## 2023-09-18 DIAGNOSIS — Z98.890 S/P ARTHROSCOPY OF LEFT SHOULDER: Primary | ICD-10-CM

## 2023-09-18 DIAGNOSIS — Z48.89 AFTERCARE FOLLOWING SURGERY: ICD-10-CM

## 2023-09-18 PROCEDURE — 97140 MANUAL THERAPY 1/> REGIONS: CPT | Performed by: PHYSICAL THERAPIST

## 2023-09-18 PROCEDURE — 97110 THERAPEUTIC EXERCISES: CPT | Performed by: PHYSICAL THERAPIST

## 2023-09-18 NOTE — PROGRESS NOTES
Daily Note     Today's date: 2023  Patient name: Carrie Byrne  : 1975  MRN: [id number]  Referring provider: Marsha Callahan DO  Dx:   Encounter Diagnosis     ICD-10-CM    1. S/P arthroscopy of left shoulder  Z98.890       2. Aftercare following surgery  Z48.89                      Subjective: Pt reports Thursday night he started to notice L sided neck pain. He reports that his bicep area is feeling better but is still sensitive to the touch. Objective: See treatment diary below      Assessment: Tolerated treatment well. TTP of the L UT. ROM of the neck WNL. Therefore believe it is related to muscular activity/nociplastic change. No treatment at this time required. ROM and strength continued to improve. Patient would benefit from continued PT      Plan: Continue per plan of care. Progress treatment as tolerated. Precautions: previous R shld surgeries    POC expires Auth Status Unit limit Start date  Expiration date PT/OT + Visit Limit?    23 45 visits 4 6/19/23 10/15/23 BOMN                                         Manuals 7/31 8/3 6/28 8/14 8/24 9/6 9/8 9/11 9/14 9/18   Phys mobs Flex, abd ER, gd 3 TARUN Flex, abd TARUN gd 1-2 Flex, abd, ER TARUN gd 1-2 Flex, abd, ER close to 90/90 TARUN gd 2-3 Flex gd 3-4, abd (deferred), ER  TARUN gd 3 Flex gd 3 ER Flex and ER gd 3 Flex and ER gd 3 Flex and ER gd 3 Flex and ER gd 3   Cervical side glide             Scar mob    desensitization 5' TARUN 3'                     Neuro Re-Ed             TB row      2x15 RTB 2x15 RTB 2x25 RTB 2x25 GTB 2x25 GTB   TB shld ext        2x25 RTB 2x25 GTB 2x25 GTB                                                                    Ther Ex             Pt edu             pendulums  30x A/P, M/L, CW/CCW           Elbow ROM             Wrist ROM             Digiflex  Blue 20x full, 10x ea           Web ext  10x           Scap retraction  10x10"           Cane ER 2x15 deferred           Cane flex 2x10 deferred 2x10 in supine - press first set flexion second set 2x10 flexion 2x10 flexion 2x10 flexion       Standing cane flexion   2x10  2x10 2x10  2x10  2x15 2x20 2x25 2x25   Standing cane abduction     2x10 2x10  2x15 2x20 2x25 2x25   Finger ladder      10x 10x 20x 25x 25x   Bicep curl       3# 2x20 3# 2x20 3# 2x25 3# 2x25   Skull crushers        2# 2x20 3# 2x25 3# 2x25   scap protraction in supine         4# 2x25    Supine shld flex          2# 2x25   Pulley's 2x15 2x15 2x15 4' 3' 4' 4' 4' 4' 4'   Ther Activity                                       Gait Training                                       Modalities

## 2023-09-19 RX ORDER — ONDANSETRON 4 MG/1
TABLET, ORALLY DISINTEGRATING ORAL
Qty: 30 TABLET | Refills: 0 | Status: SHIPPED | OUTPATIENT
Start: 2023-09-19

## 2023-09-21 ENCOUNTER — APPOINTMENT (OUTPATIENT)
Dept: PHYSICAL THERAPY | Facility: CLINIC | Age: 48
End: 2023-09-21
Payer: OTHER MISCELLANEOUS

## 2023-09-25 ENCOUNTER — OFFICE VISIT (OUTPATIENT)
Dept: PHYSICAL THERAPY | Facility: CLINIC | Age: 48
End: 2023-09-25
Payer: OTHER MISCELLANEOUS

## 2023-09-25 DIAGNOSIS — Z98.890 S/P ARTHROSCOPY OF LEFT SHOULDER: Primary | ICD-10-CM

## 2023-09-25 DIAGNOSIS — Z48.89 AFTERCARE FOLLOWING SURGERY: ICD-10-CM

## 2023-09-25 PROCEDURE — 97140 MANUAL THERAPY 1/> REGIONS: CPT | Performed by: PHYSICAL THERAPIST

## 2023-09-25 PROCEDURE — 97110 THERAPEUTIC EXERCISES: CPT | Performed by: PHYSICAL THERAPIST

## 2023-09-25 NOTE — PROGRESS NOTES
Daily Note     Today's date: 2023  Patient name: Alivia Shields  : 1975  MRN: [id number]  Referring provider: Jan Wing DO  Dx:   Encounter Diagnosis     ICD-10-CM    1. S/P arthroscopy of left shoulder  Z98.890       2. Aftercare following surgery  Z48.89                      Subjective: Pt was in the hospital last week due to GI disorder. Pt feeling better and was given return to PT clearance from MD.       Objective: See treatment diary below      Assessment: Tolerated treatment well. Strength is continuing to improve. ROM significantly improving and tolerance to flexion PROM improving well too. Patient would benefit from continued PT      Plan: Continue per plan of care. Progress treament per protocol. Precautions: previous R shld surgeries    POC expires Auth Status Unit limit Start date  Expiration date PT/OT + Visit Limit?    23 45 visits 4 6/19/23 10/15/23 BOMN                                         Manuals 9/25 8/3 6/28 8/14 8/24 9/6 9/8 9/11 9/14 9/18   Phys mobs Flex, Abd, ER TARUN gd 3 Flex, abd TARUN gd 1-2 Flex, abd, ER TARUN gd 1-2 Flex, abd, ER close to 90/90 TARUN gd 2-3 Flex gd 3-4, abd (deferred), ER  TARUN gd 3 Flex gd 3 ER Flex and ER gd 3 Flex and ER gd 3 Flex and ER gd 3 Flex and ER gd 3   Cervical side glide             Scar mob    desensitization 5' TARUN 3'                     Neuro Re-Ed             TB row      2x15 RTB 2x15 RTB 2x25 RTB 2x25 GTB 2x25 GTB   TB shld ext        2x25 RTB 2x25 GTB 2x25 GTB                                                                    Ther Ex             Pt edu             pendulums  30x A/P, M/L, CW/CCW           Elbow ROM             Wrist ROM             Digiflex  Blue 20x full, 10x ea           Web ext  10x           Scap retraction  10x10"           Cane ER  deferred           Cane flex  deferred 2x10 in supine - press first set flexion second set 2x10 flexion 2x10 flexion 2x10 flexion       Standing cane flexion 2x25  2x10  2x10 2x10 2x10  2x15 2x20 2x25 2x25   Standing cane abduction 2x25    2x10 2x10  2x15 2x20 2x25 2x25   Finger ladder 25x     10x 10x 20x 25x 25x   Bicep curl 3# 2x25      3# 2x20 3# 2x20 3# 2x25 3# 2x25   Skull crushers 3# 2x25       2# 2x20 3# 2x25 3# 2x25   scap protraction in supine         4# 2x25    Supine shld flex 3# 2x25         2# 2x25   Pulley's 4' 2x15 2x15 4' 3' 4' 4' 4' 4' 4'   Ther Activity                                       Gait Training                                       Modalities

## 2023-09-28 ENCOUNTER — APPOINTMENT (OUTPATIENT)
Dept: PHYSICAL THERAPY | Facility: CLINIC | Age: 48
End: 2023-09-28
Payer: OTHER MISCELLANEOUS

## 2023-10-02 ENCOUNTER — OFFICE VISIT (OUTPATIENT)
Dept: PHYSICAL THERAPY | Facility: CLINIC | Age: 48
End: 2023-10-02
Payer: OTHER MISCELLANEOUS

## 2023-10-02 DIAGNOSIS — Z48.89 AFTERCARE FOLLOWING SURGERY: ICD-10-CM

## 2023-10-02 DIAGNOSIS — Z98.890 S/P ARTHROSCOPY OF LEFT SHOULDER: Primary | ICD-10-CM

## 2023-10-02 PROCEDURE — 97110 THERAPEUTIC EXERCISES: CPT | Performed by: PHYSICAL THERAPIST

## 2023-10-02 PROCEDURE — 97140 MANUAL THERAPY 1/> REGIONS: CPT | Performed by: PHYSICAL THERAPIST

## 2023-10-02 NOTE — PROGRESS NOTES
Daily Note     Today's date: 10/2/2023  Patient name: Alexandra Quinteros  : 1975  MRN: [id number]  Referring provider: Nuno Posada DO  Dx:   Encounter Diagnosis     ICD-10-CM    1. S/P arthroscopy of left shoulder  Z98.890       2. Aftercare following surgery  Z48.89                      Subjective: Pt reports his shld is feeling better since Friday. He came in to speak with therapist on Friday and appt was cancelled due to a significant amount of pain. Rep for the flexionator was at the home the day before and Clotkaden Fernandez was put in a large amount of stretch for ~20 mins. Afterwards he had a significant amount of pain and noticed numbness/tingling in his L 4th and 5th digit. He states that since this time he has had a very difficult time actively using his shoulder. Over the weekend he has been icing his shoulder and trying to keep stretching it, he reports his pain has reduced from an 8/10 at rest to a 6/10 at rest.      Objective: See treatment diary below      Assessment: Tolerated treatment fair. Due to pain, limited in what we could accomplish today. Did the best we could but Cloteal Craw was limited in the amount of exercise, all listed were completed. Patient would benefit from continued PT      Plan: Continue per plan of care. Progress treatment as tolerated. Precautions: previous R shld surgeries    POC expires Auth Status Unit limit Start date  Expiration date PT/OT + Visit Limit?    23 45 visits 4 6/19/23 10/15/23 BOMN                                         Manuals 9/25 10/2 6/28 8/14 8/24 9/6 9/8 9/11 9/14 9/18   Phys mobs Flex, Abd, ER TARUN gd 3 Flex, abd, ER  TARUN gd 2 Flex, abd, ER TARUN gd 1-2 Flex, abd, ER close to / TARUN gd 2-3 Flex gd 3-4, abd (deferred), ER  TARUN gd 3 Flex gd 3 ER Flex and ER gd 3 Flex and ER gd 3 Flex and ER gd 3 Flex and ER gd 3   Cervical side glide             Scar mob    desensitization 5' TARUN 3'                     Neuro Re-Ed             TB row  GTB 2x10    2x15 RTB 2x15 RTB 2x25 RTB 2x25 GTB 2x25 GTB   TB shld ext        2x25 RTB 2x25 GTB 2x25 GTB                                                                    Ther Ex             Pt edu             pendulums             Elbow ROM             Wrist ROM             Digiflex             Web ext             Scap retraction             Cane ER             Cane flex   2x10 in supine - press first set flexion second set 2x10 flexion 2x10 flexion 2x10 flexion       Standing cane flexion 2x25 1x15 2x10  2x10 2x10  2x10  2x15 2x20 2x25 2x25   Standing cane abduction 2x25 1x15   2x10 2x10  2x15 2x20 2x25 2x25   Finger ladder 25x     10x 10x 20x 25x 25x   Bicep curl 3# 2x25 2# 2x25     3# 2x20 3# 2x20 3# 2x25 3# 2x25   Skull crushers 3# 2x25       2# 2x20 3# 2x25 3# 2x25   scap protraction in supine         4# 2x25    Supine shld flex 3# 2x25         2# 2x25   Pulley's 4' 2x10 2x15 4' 3' 4' 4' 4' 4' 4'   Ther Activity                                       Gait Training                                       Modalities

## 2023-10-05 ENCOUNTER — OFFICE VISIT (OUTPATIENT)
Dept: PHYSICAL THERAPY | Facility: CLINIC | Age: 48
End: 2023-10-05
Payer: OTHER MISCELLANEOUS

## 2023-10-05 DIAGNOSIS — Z98.890 S/P ARTHROSCOPY OF LEFT SHOULDER: Primary | ICD-10-CM

## 2023-10-05 DIAGNOSIS — Z48.89 AFTERCARE FOLLOWING SURGERY: ICD-10-CM

## 2023-10-05 PROCEDURE — 97112 NEUROMUSCULAR REEDUCATION: CPT

## 2023-10-05 PROCEDURE — 97110 THERAPEUTIC EXERCISES: CPT

## 2023-10-05 PROCEDURE — 97140 MANUAL THERAPY 1/> REGIONS: CPT

## 2023-10-05 NOTE — PROGRESS NOTES
Daily Note     Today's date: 10/5/2023  Patient name: Rick Mobley  : 1975  MRN: [id number]  Referring provider: Benjie Branch DO  Dx: No diagnosis found. Subjective: Upon presentation, SPR=2-3/10 at worst.  Patient noted shoulder has "calmed down" significantly since resting from initial Flexinator use. Post today's intervention, patient reported muscular "soreness" ; with clarification, "muscle fatigue". Objective: See treatment diary below      Assessment: Tolerated treatment with left UE AAROM restrictions at end range felxion, abduction and ER. Patient responded well to gentle joint distraction with PROM offering improved ROM and no reports of discomfort. . Patient demonstrated fatigue post treatment and exhibited good technique with therapeutic exercises      Plan: Continue per plan of care. Progress treatment as tolerated. Precautions: previous R shld surgeries    POC expires Auth Status Unit limit Start date  Expiration date PT/OT + Visit Limit?    23 45 visits 4 6/19/23 10/15/23 BOMN                                         Manuals 9/25 10/2 10/5   9/6 9/8 9/11 9/14 9/18   Phys mobs Flex, Abd, ER TARUN gd 3 Flex, abd, ER  TARUN gd 2 PROM    All planes    LA   Flex gd 3 ER Flex and ER gd 3 Flex and ER gd 3 Flex and ER gd 3 Flex and ER gd 3   Cervical side glide             Scar mob                          Neuro Re-Ed             TB row  GTB 2x10 GTB  20x   2x15 RTB 2x15 RTB 2x25 RTB 2x25 GTB 2x25 GTB   TB shld ext   GTB  20x     2x25 RTB 2x25 GTB 2x25 GTB                                                                    Ther Ex             Pt edu             pendulums             Elbow ROM             Wrist ROM             Digiflex             Web ext             Scap retraction             Cane ER             Cane flex             Standing cane flexion 2x25 1x15 :05+  20x    2x15 2x20 2x25 2x25   Standing cane abduction 2x25 1x15 :05+  20x    2x15 2x20 2x25 2x25 Finger ladder 25x      10x 20x 25x 25x   Bicep curl 3# 2x25 2# 2x25 3#  25x2    3# 2x20 3# 2x20 3# 2x25 3# 2x25   Skull crushers 3# 2x25  3#  25x2     2# 2x20 3# 2x25 3# 2x25   scap protraction in supine   3#  25x2      4# 2x25    Supine shld flex 3# 2x25  3#  25x2       2# 2x25   Pulley's 4' 2x10 :10+  5'    4' 4' 4' 4'   Ther Activity                                       Gait Training                                       Modalities

## 2023-10-09 ENCOUNTER — OFFICE VISIT (OUTPATIENT)
Dept: PHYSICAL THERAPY | Facility: CLINIC | Age: 48
End: 2023-10-09
Payer: OTHER MISCELLANEOUS

## 2023-10-09 DIAGNOSIS — Z98.890 S/P ARTHROSCOPY OF LEFT SHOULDER: Primary | ICD-10-CM

## 2023-10-09 DIAGNOSIS — Z48.89 AFTERCARE FOLLOWING SURGERY: ICD-10-CM

## 2023-10-09 PROCEDURE — 97110 THERAPEUTIC EXERCISES: CPT | Performed by: PHYSICAL THERAPIST

## 2023-10-09 PROCEDURE — 97140 MANUAL THERAPY 1/> REGIONS: CPT | Performed by: PHYSICAL THERAPIST

## 2023-10-09 NOTE — PROGRESS NOTES
Daily Note     Today's date: 10/9/2023  Patient name: Ifeanyi Moon  : 1975  MRN: [id number]  Referring provider: Pk Sorensen DO  Dx:   Encounter Diagnosis     ICD-10-CM    1. S/P arthroscopy of left shoulder  Z98.890       2. Aftercare following surgery  Z48.89                      Subjective: Pt offers no new complaints. Objective: See treatment diary below      Assessment: UBE for muscular endurance. Tolerated treatment well. Trialed improving shld abduction ROM. Assessed jot mobility and PROM. Limited ER in 90/90 improved with MT, however, no change in abd ROM. Assessed jt mobility in abd, most limited in post glide, however jt mobs in this direction did not change symptoms. Trialed roll and glide inf glie mob and no improvement with this either. ER strength in neutral god but in abduction his strength is significantly limited. Will work on this in future visits. Patient would benefit from continued PT      Plan: Continue per plan of care. Progress treatment as tolerated. Precautions: previous R shld surgeries    POC expires Auth Status Unit limit Start date  Expiration date PT/OT + Visit Limit?    23 45 visits 4 6/19/23 10/15/23 BOMN                                         Manuals 9/25 10/2 10/5 10/9  9/6 9/8 9/11 9/14 9/18   Phys mobs Flex, Abd, ER ALEKSANDR gd 3 Flex, abd, ER  ALEKSANDR gd 2 PROM    All planes    LA ER aleksandr gd IV 6x1' 61d to 75d post treatment  Flex gd 3 ER Flex and ER gd 3 Flex and ER gd 3 Flex and ER gd 3 Flex and ER gd 3   Cervical side glide             Scar mob             Post glide end range abd    ALEKSANDR 2x30" gd IV         Inf roll and glide end range abduction    ALEKSANDR 4x30" gd IV         Neuro Re-Ed             TB row  GTB 2x10 GTB  20x   2x15 RTB 2x15 RTB 2x25 RTB 2x25 GTB 2x25 GTB   TB shld ext   GTB  20x     2x25 RTB 2x25 GTB 2x25 GTB                                                                    Ther Ex             Pt edu             pendulums             Elbow ROM             Wrist ROM             Digiflex             Web ext             Scap retraction             Cane ER             Cane flex             Standing cane flexion 2x25 1x15 :05+  20x    2x15 2x20 2x25 2x25   Standing cane abduction 2x25 1x15 :05+  20x    2x15 2x20 2x25 2x25   Finger ladder 25x      10x 20x 25x 25x   Bicep curl 3# 2x25 2# 2x25 3#  25x2    3# 2x20 3# 2x20 3# 2x25 3# 2x25   Skull crushers 3# 2x25  3#  25x2     2# 2x20 3# 2x25 3# 2x25   scap protraction in supine   3#  25x2      4# 2x25    Supine shld flex 3# 2x25  3#  25x2       2# 2x25   Pulley's 4' 2x10 :10+  5' 3'   4' 4' 4' 4'   UBE    2'/2' fwd/retro         Ther Activity                                       Gait Training                                       Modalities

## 2023-10-11 ENCOUNTER — OFFICE VISIT (OUTPATIENT)
Dept: PHYSICAL THERAPY | Facility: CLINIC | Age: 48
End: 2023-10-11
Payer: OTHER MISCELLANEOUS

## 2023-10-11 DIAGNOSIS — Z48.89 AFTERCARE FOLLOWING SURGERY: Primary | ICD-10-CM

## 2023-10-11 DIAGNOSIS — E78.2 MIXED HYPERLIPIDEMIA: ICD-10-CM

## 2023-10-11 DIAGNOSIS — I10 ESSENTIAL HYPERTENSION: ICD-10-CM

## 2023-10-11 DIAGNOSIS — Z98.890 S/P ARTHROSCOPY OF LEFT SHOULDER: ICD-10-CM

## 2023-10-11 PROCEDURE — 97110 THERAPEUTIC EXERCISES: CPT | Performed by: PHYSICAL THERAPIST

## 2023-10-11 PROCEDURE — 97112 NEUROMUSCULAR REEDUCATION: CPT | Performed by: PHYSICAL THERAPIST

## 2023-10-11 RX ORDER — ROSUVASTATIN CALCIUM 40 MG/1
40 TABLET, COATED ORAL DAILY
Qty: 90 TABLET | Refills: 0 | Status: SHIPPED | OUTPATIENT
Start: 2023-10-11

## 2023-10-11 RX ORDER — LISINOPRIL 20 MG/1
20 TABLET ORAL DAILY
Qty: 90 TABLET | Refills: 0 | Status: SHIPPED | OUTPATIENT
Start: 2023-10-11

## 2023-10-11 RX ORDER — AMLODIPINE BESYLATE 5 MG/1
5 TABLET ORAL DAILY
Qty: 30 TABLET | Refills: 0 | Status: SHIPPED | OUTPATIENT
Start: 2023-10-11

## 2023-10-11 NOTE — PROGRESS NOTES
Daily Note     Today's date: 10/11/2023  Patient name: Fatmata Stewart  : 1975  MRN: [id number]  Referring provider: Kelin Green DO  Dx:   Encounter Diagnosis     ICD-10-CM    1. Aftercare following surgery  Z48.89       2. S/P arthroscopy of left shoulder  Z98.890                      Subjective: Pt reports some soreness following last visit that is still lingering today. Objective: See treatment diary below      Assessment: Tolerated treatment well. Able to progress loading on the RTC with abduction via eccentrics. Tolerated Wbing exercise without difficulty as well  today. Patient would benefit from continued PT      Plan: Continue per plan of care. Progress treatment as tolerated. Precautions: previous R shld surgeries    POC expires Auth Status Unit limit Start date  Expiration date PT/OT + Visit Limit?    23 45 visits 4 6/19/23 10/15/23 BOMN                                         Manuals 9/25 10/2 10/5 10/9 10/11 9/6 9/8 9/11 9/14 9/18   Phys mobs Flex, Abd, ER ALEKSANDR gd 3 Flex, abd, ER  ALEKSANDR gd 2 PROM    All planes    LA ER aleksandr gd IV 6x1' 61d to 75d post treatment  Flex gd 3 ER Flex and ER gd 3 Flex and ER gd 3 Flex and ER gd 3 Flex and ER gd 3   Cervical side glide             Scar mob             Post glide end range abd    ALEKSANDR 2x30" gd IV         Inf roll and glide end range abduction    ALEKSANDR 4x30" gd IV         Neuro Re-Ed             TB row  GTB 2x10 GTB  20x  [place] 20# 2x15 2x15 RTB 2x15 RTB 2x25 RTB 2x25 GTB 2x25 GTB   TB shld ext   GTB  20x     2x25 RTB 2x25 GTB 2x25 GTB   Prone Ext     2x15        Prone T     2x15        TB shld ER     2x15 RTB        TB shld IR     2x15 RTB                     Ther Ex             Pt edu             pendulums             Elbow ROM             Wrist ROM             Digiflex             Web ext             Scap retraction             Cane ER             Cane flex             Standing cane flexion 2x25 1x15 :05+  20x    2x15 2x20 2x25 2x25 Standing cane abduction 2x25 1x15 :05+  20x  Ecc 2x15  2x15 2x20 2x25 2x25   Finger ladder 25x      10x 20x 25x 25x   Bicep curl 3# 2x25 2# 2x25 3#  25x2    3# 2x20 3# 2x20 3# 2x25 3# 2x25   Skull crushers 3# 2x25  3#  25x2     2# 2x20 3# 2x25 3# 2x25   scap protraction in supine   3#  25x2      4# 2x25    Supine shld flex 3# 2x25  3#  25x2       2# 2x25   Pulley's 4' 2x10 :10+  5' 3' 3'  4' 4' 4' 4'   Wall push-up     2x10        UBE    2'/2' fwd/retro 2'/2' fwd/retro        Ther Activity                                       Gait Training                                       Modalities

## 2023-10-12 ENCOUNTER — OFFICE VISIT (OUTPATIENT)
Dept: OBGYN CLINIC | Facility: CLINIC | Age: 48
End: 2023-10-12
Payer: OTHER MISCELLANEOUS

## 2023-10-12 VITALS
BODY MASS INDEX: 26.6 KG/M2 | WEIGHT: 190 LBS | DIASTOLIC BLOOD PRESSURE: 85 MMHG | SYSTOLIC BLOOD PRESSURE: 136 MMHG | HEART RATE: 81 BPM | HEIGHT: 71 IN

## 2023-10-12 DIAGNOSIS — Z48.89 AFTERCARE FOLLOWING SURGERY: ICD-10-CM

## 2023-10-12 DIAGNOSIS — Z98.890 S/P ARTHROSCOPY OF LEFT SHOULDER: Primary | ICD-10-CM

## 2023-10-12 PROCEDURE — 99213 OFFICE O/P EST LOW 20 MIN: CPT | Performed by: ORTHOPAEDIC SURGERY

## 2023-10-12 NOTE — PROGRESS NOTES
Patient Name:  Xavi Leon  MRN:  [id number]    [address] [address] [facility]     1. S/P arthroscopy of left shoulder    2. Aftercare following surgery      Approximately 4 month s/p Left shoulder arthroscopic rotator cuff repair, open subpectoral biceps tenodesis, acromioplasty, extensive debridement performed on 06/09/2023  Overall, patient doing well s/p surgical intervention and improving range of motion despite residual stiffness with abduction. Advised patient to continue outpatient PT to work on range of motion, especially with abduction, strengthening per protocol. Continue home exercises and ERMI as it provides improvement of range of motion  Work note provided to patient with restrictions. Advised patient would likely transition to full duty next appointment. Follow up in 8 weeks     History of the Present Illness   Xavi Leon is a 46 y.o. male approximately 4 month s/p Left shoulder arthroscopic rotator cuff repair, open subpectoral biceps tenodesis, acromioplasty, extensive debridement performed on 06/09/2023. Today, patient reports he is doing well s/p surgical intervention. He admits he continues to attend outpatient PT and use ERMI at home with mild progression of motion. He admits to most difficulty with abduction. He has not yet been back to work. Review of Systems     Review of Systems   Constitutional:  Negative for chills and fever. HENT:  Negative for ear pain and sore throat. Eyes:  Negative for pain and visual disturbance. Respiratory:  Negative for cough and shortness of breath. Cardiovascular:  Negative for chest pain and palpitations. Gastrointestinal:  Negative for abdominal pain and vomiting. Genitourinary:  Negative for dysuria and hematuria. Musculoskeletal:  Negative for arthralgias and back pain. Skin:  Negative for color change and rash. Neurological:  Negative for seizures and syncope. All other systems reviewed and are negative.       Physical Exam /85   Pulse 81   Ht 5' 11" (1.803 m)   Wt 86.2 kg (190 lb)   BMI 26.50 kg/m²     Left Shoulder:   Surgical incisions well healed  Active range of motion   160-170 degrees forward flexion  100 degrees abduction  30-40 degrees external rotation   SI joint internal rotation    Passive range of motion   130 degrees of forward flexion   There is no tenderness present over the shoulder. Supraspinatus testing 4+/5  The patient is neurovascularly intact distally in the extremity. Data Review     I have personally reviewed pertinent films in PACS, and my interpretation follows. No new images     Social History     Tobacco Use    Smoking status: Former     Packs/day: 1.00     Years: 20.00     Total pack years: 20.00     Types: Cigarettes     Quit date: 3/15/2021     Years since quittin.5    Smokeless tobacco: Never   Vaping Use    Vaping Use: Some days    Substances: Nicotine   Substance Use Topics    Alcohol use:  Yes     Alcohol/week: 2.0 standard drinks of alcohol     Types: 2 Cans of beer per week     Comment: on weekends    Drug use: Never           Procedures  None      Cristobal Moya PA-C

## 2023-10-12 NOTE — LETTER
October 12, 2023     Patient: Jenna Gauthier  YOB: 1975  Date of Visit: 10/12/2023      To Whom it May Concern:    Jenna Gauthier is under my professional care. Lyly Paulino was seen in my office on 10/12/2023. Lyly Paulino may return to work with light duty restrictions on Monday 10/16/23. Restrictions include: no loading truck, no lifting, no overhead lifting, no pushing or pulling, no climbing ladders. 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 If you have any questions or concerns, please don't hesitate to call.          Sincerely,          Suszanne Dakins,         CC: No Recipients

## 2023-10-16 ENCOUNTER — TELEPHONE (OUTPATIENT)
Age: 48
End: 2023-10-16

## 2023-10-16 NOTE — TELEPHONE ENCOUNTER
Caller: Grisel Lewis    Doctor: Lady Campbell    Reason for call: Calling to confirm pt was seen in the office last week and when next f/u is. Requesting OV notes from 10/12/23 be faxed to her.     Fax #: [phone]    Call back#: [phone]

## 2023-10-17 DIAGNOSIS — K29.30 CHRONIC SUPERFICIAL GASTRITIS WITHOUT BLEEDING: ICD-10-CM

## 2023-10-17 RX ORDER — ESOMEPRAZOLE MAGNESIUM 40 MG/1
40 CAPSULE, DELAYED RELEASE ORAL
Qty: 60 CAPSULE | Refills: 5 | Status: SHIPPED | OUTPATIENT
Start: 2023-10-17

## 2023-10-20 ENCOUNTER — OFFICE VISIT (OUTPATIENT)
Dept: PHYSICAL THERAPY | Facility: CLINIC | Age: 48
End: 2023-10-20
Payer: OTHER MISCELLANEOUS

## 2023-10-20 DIAGNOSIS — Z48.89 AFTERCARE FOLLOWING SURGERY: Primary | ICD-10-CM

## 2023-10-20 DIAGNOSIS — Z98.890 S/P ARTHROSCOPY OF LEFT SHOULDER: ICD-10-CM

## 2023-10-20 PROCEDURE — 97140 MANUAL THERAPY 1/> REGIONS: CPT | Performed by: PHYSICAL THERAPIST

## 2023-10-20 PROCEDURE — 97112 NEUROMUSCULAR REEDUCATION: CPT | Performed by: PHYSICAL THERAPIST

## 2023-10-20 PROCEDURE — 97110 THERAPEUTIC EXERCISES: CPT | Performed by: PHYSICAL THERAPIST

## 2023-10-20 NOTE — PROGRESS NOTES
Daily Note     Today's date: 10/20/2023  Patient name: Alia Enciso  : 1975  MRN: [id number]  Referring provider: Melanie Ordaz PA-C  Dx:   Encounter Diagnosis     ICD-10-CM    1. Aftercare following surgery  Z48.89       2. S/P arthroscopy of left shoulder  Z98.890                      Subjective: Pt reports that he was able to load 4 cords of wood yesterday and has no soreness today. However, he continues to have limitation in abd ROM. Objective: See treatment diary below      Assessment: UBE for muscular endurance. Tolerated treatment well. Reassessment to further distinguish abd ROM, noted again, ROM not the issue, strength is. He does not present with any signs of failed reattachment, but that the muscles are not sufficiently strong at this point to tolerate abduction loading. Initiated strengthening for abduction in weak musculature, updated HEP to reflect. Patient would benefit from continued PT      Plan: Continue per plan of care. Progress treatment as tolerated. Precautions: previous R shld surgeries    POC expires Auth Status Unit limit Start date  Expiration date PT/OT + Visit Limit?    23 45 visits 4 6/19/23 10/15/23 BOMN                                         Manuals 9/25 10/2 10/5 10/9 10/11 10/16 9/8 9/11 9/14 9/18   Phys mobs Flex, Abd, ER TARUN gd 3 Flex, abd, ER  TARUN gd 2 PROM    All planes    LA ER tarun gd IV 6x1' 61d to 75d post treatment   Flex and ER gd 3 Flex and ER gd 3 Flex and ER gd 3 Flex and ER gd 3   Cervical side glide             Scar mob             Post glide end range abd    TARUN 2x30" gd IV         Inf roll and glide end range abduction    TARUN 4x30" gd IV         Reassessment of ROM/MMT to discern abduction restriction      TARUN - 20'- lack of strength, ROM WNL       Neuro Re-Ed             TB row  GTB 2x10 GTB  20x  [place] 20# 2x15  2x15 RTB 2x25 RTB 2x25 GTB 2x25 GTB   TB shld ext   GTB  20x     2x25 RTB 2x25 GTB 2x25 GTB   Prone Ext     2x15 2x15 3# Prone T     2x15 2x15       TB shld ER     2x15 RTB        TB shld IR     2x15 RTB                     Ther Ex             Pt edu             pendulums             Elbow ROM             Wrist ROM             Digiflex             Web ext             Scap retraction             Cane ER             Cane flex             Standing cane flexion 2x25 1x15 :05+  20x    2x15 2x20 2x25 2x25   Standing cane abduction 2x25 1x15 :05+  20x  Ecc 2x15  2x15 2x20 2x25 2x25   Finger ladder 25x      10x 20x 25x 25x   Bicep curl 3# 2x25 2# 2x25 3#  25x2    3# 2x20 3# 2x20 3# 2x25 3# 2x25   Skull crushers 3# 2x25  3#  25x2     2# 2x20 3# 2x25 3# 2x25   scap protraction in supine   3#  25x2      4# 2x25    Supine shld flex 3# 2x25  3#  25x2       2# 2x25   Pulley's 4' 2x10 :10+  5' 3' 3'  4' 4' 4' 4'   Wall push-up     2x10        Seated ER 90/90       1-3# 1x20, 1x15, 1x15       UBE    2'/2' fwd/retro 2'/2' fwd/retro 3'/3'       Ther Activity                                       Gait Training                                       Modalities

## 2023-10-23 ENCOUNTER — OFFICE VISIT (OUTPATIENT)
Dept: PHYSICAL THERAPY | Facility: CLINIC | Age: 48
End: 2023-10-23
Payer: OTHER MISCELLANEOUS

## 2023-10-23 DIAGNOSIS — Z48.89 AFTERCARE FOLLOWING SURGERY: Primary | ICD-10-CM

## 2023-10-23 DIAGNOSIS — Z98.890 S/P ARTHROSCOPY OF LEFT SHOULDER: ICD-10-CM

## 2023-10-23 PROCEDURE — 97110 THERAPEUTIC EXERCISES: CPT | Performed by: PHYSICAL THERAPIST

## 2023-10-23 PROCEDURE — 97112 NEUROMUSCULAR REEDUCATION: CPT | Performed by: PHYSICAL THERAPIST

## 2023-10-23 NOTE — PROGRESS NOTES
Daily Note     Today's date: 10/23/2023  Patient name: Christianne Canseco  : 1975  MRN: [id number]  Referring provider: Kayli Alonso PA-C  Dx:   Encounter Diagnosis     ICD-10-CM    1. Aftercare following surgery  Z48.89       2. S/P arthroscopy of left shoulder  Z98.890                      Subjective: Pt reports that while pulling a glass pan down from the cupboard, he had a feeling of instability in the shoulder. He reports it is a little sore from this but it is ok. He states that this was the first time he reached to the top shelf in the cupboard. Objective: See treatment diary below      Assessment: UBE for muscular endurance. Tolerated treatment well. Continued significant weakness, however, some improvement noted in prone exercises. Educated on continuing Hep while on trip, pt verbalized understanding. Pt able to complete push-ups without difficulty today. Patient would benefit from continued PT      Plan: Continue per plan of care. Progress treatment as tolerated. Precautions: previous R shld surgeries    POC expires Auth Status Unit limit Start date  Expiration date PT/OT + Visit Limit?    23 45 visits 4 6/19/23 10/15/23 BOMN                                         Manuals 9/25 10/2 10/5 10/9 10/11 10/16 10/23 9/11 9/14 9/18   Phys mobs Flex, Abd, ER ALEKSANDR gd 3 Flex, abd, ER  ALEKSANDR gd 2 PROM    All planes    LA ER aleksandr gd IV 6x1' 61d to 75d post treatment    Flex and ER gd 3 Flex and ER gd 3 Flex and ER gd 3   Cervical side glide             Scar mob             Post glide end range abd    ALEKSANDR 2x30" gd IV         Inf roll and glide end range abduction    ALEKSANDR 4x30" gd IV         Reassessment of ROM/MMT to discern abduction restriction      ALEKSANDR - 20'- lack of strength, ROM WNL       Neuro Re-Ed             TB row  GTB 2x10 GTB  20x  [place] 20# 2x15   2x25 RTB 2x25 GTB 2x25 GTB   TB shld ext   GTB  20x     2x25 RTB 2x25 GTB 2x25 GTB   Prone Ext     2x15 2x15 3# 2x15 3#      Prone T     2x15 2x15 2x15      TB shld ER     2x15 RTB  [place] 3# 2x20      TB shld IR     2x15 RTB  [place] 3# 2x20                   Ther Ex             Pt edu             pendulums             Elbow ROM             Wrist ROM             Digiflex             Web ext             Scap retraction             Cane ER             Cane flex             Standing cane flexion 2x25 1x15 :05+  20x     2x20 2x25 2x25   Standing cane abduction 2x25 1x15 :05+  20x  Ecc 2x15   2x20 2x25 2x25   Finger ladder 25x       20x 25x 25x   Bicep curl 3# 2x25 2# 2x25 3#  25x2     3# 2x20 3# 2x25 3# 2x25   Skull crushers 3# 2x25  3#  25x2     2# 2x20 3# 2x25 3# 2x25   scap protraction in supine   3#  25x2      4# 2x25    Supine shld flex 3# 2x25  3#  25x2       2# 2x25   Pulley's 4' 2x10 :10+  5' 3' 3'   4' 4' 4'   Wall push-up     2x10  On plinth 3x12      Seated ER 90/90       1-3# 1x20, 1x15, 1x15 3# 3x10      UBE    2'/2' fwd/retro 2'/2' fwd/retro 3'/3' 3'/3'      Ther Activity                                       Gait Training                                       Modalities

## 2023-10-31 ENCOUNTER — TELEPHONE (OUTPATIENT)
Dept: OBGYN CLINIC | Facility: HOSPITAL | Age: 48
End: 2023-10-31

## 2023-10-31 NOTE — TELEPHONE ENCOUNTER
Caller: DELL    Doctor/Office: Niraj RODRIGUEZ#: [id number]      What needs to be faxed: OV 10/12/23    ATTN to: Kathia Farooq    Fax#: [phone]      Documents were successfully e-faxed

## 2023-11-02 ENCOUNTER — OFFICE VISIT (OUTPATIENT)
Dept: PHYSICAL THERAPY | Facility: CLINIC | Age: 48
End: 2023-11-02
Payer: OTHER MISCELLANEOUS

## 2023-11-02 DIAGNOSIS — Z48.89 AFTERCARE FOLLOWING SURGERY: Primary | ICD-10-CM

## 2023-11-02 DIAGNOSIS — Z98.890 S/P ARTHROSCOPY OF LEFT SHOULDER: ICD-10-CM

## 2023-11-02 PROCEDURE — 97110 THERAPEUTIC EXERCISES: CPT | Performed by: PHYSICAL THERAPIST

## 2023-11-02 PROCEDURE — 97112 NEUROMUSCULAR REEDUCATION: CPT | Performed by: PHYSICAL THERAPIST

## 2023-11-02 NOTE — PROGRESS NOTES
Daily Note     Today's date: 2023  Patient name: Mejia Hedrick  : 1975  MRN: [id number]  Referring provider: Deja Kim PA-C  Dx:   Encounter Diagnosis     ICD-10-CM    1. Aftercare following surgery  Z48.89       2. S/P arthroscopy of left shoulder  Z98.890                      Subjective: Pt was away on vacation. No change in shoulder since this time. Objective: See treatment diary below      Assessment: UBE for muscular endurance. Tolerated treatment well. ER strength continues to improve. Abduction ROM still not improving. Began loading abduction ROM today, pt able to tolerate with minimal pain. Patient would benefit from continued PT      Plan: Continue per plan of care. Progress treatment as tolerated. Precautions: previous R shld surgeries    POC expires Auth Status Unit limit Start date  Expiration date PT/OT + Visit Limit?    23 45 visits 4 6/19/23 10/15/23 BOMN                                         Manuals 9/25 10/2 10/5 10/9 10/11 10/16 10/23 11/2 9/14 9/18   Phys mobs Flex, Abd, ER ALEKSANDR gd 3 Flex, abd, ER  ALEKSANDR gd 2 PROM    All planes    LA ER aleksandr gd IV 6x1' 61d to 75d post treatment     Flex and ER gd 3 Flex and ER gd 3   Cervical side glide             Scar mob             Post glide end range abd    ALEKSANDR 2x30" gd IV         Inf roll and glide end range abduction    ALEKSANDR 4x30" gd IV         Reassessment of ROM/MMT to discern abduction restriction      ALEKSANDR - 20'- lack of strength, ROM WNL       Neuro Re-Ed             TB row  GTB 2x10 GTB  20x  [place] 20# 2x15    2x25 GTB 2x25 GTB   TB shld ext   GTB  20x      2x25 GTB 2x25 GTB   Prone Ext     2x15 2x15 3# 2x15 3# 2x15 3#     Prone T     2x15 2x15 2x15 2x15 3#     TB shld ER     2x15 RTB  [place] 3# 2x20 [place] 4# 4x10     TB shld IR     2x15 RTB  [place] 3# 2x20 [place] 4# 2x20                  Ther Ex             Pt edu             pendulums             Elbow ROM             Wrist ROM             Digiflex             Web ext Scap retraction             Cane ER             Cane flex             Standing cane flexion 2x25 1x15 :05+  20x      2x25 2x25   Standing cane abduction 2x25 1x15 :05+  20x  Ecc 2x15    2x25 2x25   Finger ladder 25x        25x 25x   Bicep curl 3# 2x25 2# 2x25 3#  25x2      3# 2x25 3# 2x25   Skull crushers 3# 2x25  3#  25x2      3# 2x25 3# 2x25   scap protraction in supine   3#  25x2      4# 2x25    Supine shld flex 3# 2x25  3#  25x2       2# 2x25   Pulley's 4' 2x10 :10+  5' 3' 3'    4' 4'   Wall push-up     2x10  On plinth 3x12      Seated ER 90/90       1-3# 1x20, 1x15, 1x15 3# 3x10      Standing shld abd        1# 3x10     UBE    2'/2' fwd/retro 2'/2' fwd/retro 3'/3' 3'/3' 3'/3'     Ther Activity                                       Gait Training                                       Modalities

## 2023-11-07 ENCOUNTER — OFFICE VISIT (OUTPATIENT)
Dept: PHYSICAL THERAPY | Facility: CLINIC | Age: 48
End: 2023-11-07
Payer: OTHER MISCELLANEOUS

## 2023-11-07 DIAGNOSIS — Z98.890 S/P ARTHROSCOPY OF LEFT SHOULDER: ICD-10-CM

## 2023-11-07 DIAGNOSIS — Z48.89 AFTERCARE FOLLOWING SURGERY: Primary | ICD-10-CM

## 2023-11-07 DIAGNOSIS — I10 ESSENTIAL HYPERTENSION: ICD-10-CM

## 2023-11-07 PROCEDURE — 97110 THERAPEUTIC EXERCISES: CPT | Performed by: PHYSICAL THERAPIST

## 2023-11-07 PROCEDURE — 97112 NEUROMUSCULAR REEDUCATION: CPT | Performed by: PHYSICAL THERAPIST

## 2023-11-07 PROCEDURE — 97140 MANUAL THERAPY 1/> REGIONS: CPT | Performed by: PHYSICAL THERAPIST

## 2023-11-07 RX ORDER — AMLODIPINE BESYLATE 5 MG/1
5 TABLET ORAL DAILY
Qty: 30 TABLET | Refills: 0 | Status: SHIPPED | OUTPATIENT
Start: 2023-11-07

## 2023-11-07 NOTE — PROGRESS NOTES
Daily Note     Today's date: 2023  Patient name: Ifeanyi Moon  : 1975  MRN: [id number]  Referring provider: Pk Sorensen PA-C  Dx:   Encounter Diagnosis     ICD-10-CM    1. Aftercare following surgery  Z48.89       2. S/P arthroscopy of left shoulder  Z98.890                      Subjective: Pt reports 1 hour after last visit he started getting bicep pain and numbness/tingling back in the 4,5th digit on the L hand. He denies any mechanical cause of symptoms, and that it just has been painful and numb since. Objective: See treatment diary below      Assessment: Tolerated treatment fair. Unable to detect a cervical cause of the symptoms. Ulnar nerve tension test (-). PROM abduction to quadrant position recreated bicep pain, numbness did not change. Decided to continue with exercise program with modification. Able to demonstrate improved shoulder abduction ROM with thumb up today compared to previous visits. Numbness unchanged with exercise today. Education on HEP. Patient would benefit from continued PT      Plan: Continue per plan of care. Progress treatment as tolerated. Precautions: previous R shld surgeries    POC expires Auth Status Unit limit Start date  Expiration date PT/OT + Visit Limit?    23 45 visits 4 6/19/23 10/15/23 BOMN                                         Manuals 9/25 10/2 10/5 10/9 10/11 10/16 10/23 11/2 11/7 9/18   Phys mobs Flex, Abd, ER TARUN gd 3 Flex, abd, ER  TARUN gd 2 PROM    All planes    LA ER tarun gd IV 6x1' 61d to 75d post treatment      Flex and ER gd 3   Cervical side glide             Scar mob             Post glide end range abd    TARUN 2x30" gd IV         Inf roll and glide end range abduction    TARUN 4x30" gd IV         Reassessment of ROM/MMT to discern abduction restriction      TARUN - 20'- lack of strength, ROM WNL       Assessment of cervical spine         TARUN    Neuro Re-Ed             TB row  GTB 2x10 GTB  20x  Sun 20# 2x15     2x25 GTB   TB shld ext GTB  20x       2x25 GTB   Prone Ext     2x15 2x15 3# 2x15 3# 2x15 3# 3x10     Prone T     2x15 2x15 2x15 2x15 3# 3x10     TB shld ER     2x15 RTB  Sun 3# 2x20 [place] 4# 4x10     TB shld IR     2x15 RTB  Sun 3# 2x20 [place] 4# 2x20                  Ther Ex             Pt edu         TARUN    pendulums             Elbow ROM             Wrist ROM             Digiflex             Web ext             Scap retraction             Cane ER             Cane flex             Standing cane flexion 2x25 1x15 :05+  20x       2x25   Standing cane abduction 2x25 1x15 :05+  20x  Ecc 2x15     2x25   Finger ladder 25x         25x   Bicep curl 3# 2x25 2# 2x25 3#  25x2       3# 2x25   Skull crushers 3# 2x25  3#  25x2       3# 2x25   scap protraction in supine   3#  25x2          Supine shld flex 3# 2x25  3#  25x2       2# 2x25   Pulley's 4' 2x10 :10+  5' 3' 3'     4'   Wall push-up     2x10  On plinth 3x12      Seated ER 90/90       1-3# 1x20, 1x15, 1x15 3# 3x10  3# 3x10    Standing shld abd        1# 3x10     UBE    2'/2' fwd/retro 2'/2' fwd/retro 3'/3' 3'/3' 3'/3'     Ther Activity                                       Gait Training                                       Modalities

## 2023-11-14 ENCOUNTER — APPOINTMENT (OUTPATIENT)
Dept: PHYSICAL THERAPY | Facility: CLINIC | Age: 48
End: 2023-11-14
Payer: OTHER MISCELLANEOUS

## 2023-11-21 ENCOUNTER — OFFICE VISIT (OUTPATIENT)
Dept: PHYSICAL THERAPY | Facility: CLINIC | Age: 48
End: 2023-11-21
Payer: OTHER MISCELLANEOUS

## 2023-11-21 DIAGNOSIS — Z48.89 AFTERCARE FOLLOWING SURGERY: Primary | ICD-10-CM

## 2023-11-21 DIAGNOSIS — Z98.890 S/P ARTHROSCOPY OF LEFT SHOULDER: ICD-10-CM

## 2023-11-21 PROCEDURE — 97110 THERAPEUTIC EXERCISES: CPT | Performed by: PHYSICAL THERAPIST

## 2023-11-21 PROCEDURE — 97112 NEUROMUSCULAR REEDUCATION: CPT | Performed by: PHYSICAL THERAPIST

## 2023-11-21 NOTE — PROGRESS NOTES
Daily Note     Today's date: 2023  Patient name: Cami Juarez  : 1975  MRN: [id number]  Referring provider: Jenniffer Lewis PA-C  Dx:   Encounter Diagnosis     ICD-10-CM    1. Aftercare following surgery  Z48.89       2. S/P arthroscopy of left shoulder  Z98.890                      Subjective: Pt reports his shld strength is improving. Objective: See treatment diary below      Assessment: Tolerated treatment well. Improved shld abd AROM to 110d. Less shakiness at end range. Strength improving in all directions. Patient would benefit from continued PT      Plan: Continue per plan of care. Progress treatment as tolerated. Precautions: previous R shld surgeries    POC expires Auth Status Unit limit Start date  Expiration date PT/OT + Visit Limit?    23 45 visits 4 6/19/23 10/15/23 BOMN                                         Manuals 9/25 10/2 10/5 10/9 10/11 10/16 10/23 11/2 11/7 11/21   Phys mobs Flex, Abd, ER TARUN gd 3 Flex, abd, ER  TARUN gd 2 PROM    All planes    LA ER tarun gd IV 6x1' 61d to 75d post treatment         Cervical side glide             Scar mob             Post glide end range abd    TARUN 2x30" gd IV         Inf roll and glide end range abduction    TARUN 4x30" gd IV         Reassessment of ROM/MMT to discern abduction restriction      TARUN - 20'- lack of strength, ROM WNL       Assessment of cervical spine         TARUN    Neuro Re-Ed             TB row  GTB 2x10 GTB  20x  Sun 20# 2x15        TB shld ext   GTB  20x          Prone Ext     2x15 2x15 3# 2x15 3# 2x15 3# 3x10  2x15 4#   Prone T     2x15 2x15 2x15 2x15 3# 3x10  2x15 4#   TB shld ER     2x15 RTB  Sun 3# 2x20 [place] 4# 4x10  Sun 4# 3x15   TB shld IR     2x15 RTB  Sun 3# 2x20 [place] 4# 2x20  Sun 5# 3x15                Ther Ex             Pt edu         TARUN    pendulums             Elbow ROM             Wrist ROM             Digiflex             Web ext             Scap retraction             Cane ER Cane flex             Standing cane flexion 2x25 1x15 :05+  20x          Standing cane abduction 2x25 1x15 :05+  20x  Ecc 2x15        Finger ladder 25x            Bicep curl 3# 2x25 2# 2x25 3#  25x2          Skull crushers 3# 2x25  3#  25x2          scap protraction in supine   3#  25x2          Supine shld flex 3# 2x25  3#  25x2          Pulley's 4' 2x10 :10+  5' 3' 3'        Wall push-up     2x10  On plinth 3x12      Seated ER 90/90       1-3# 1x20, 1x15, 1x15 3# 3x10  3# 3x10 4# 3x15   Standing shld abd        1# 3x10  4# 3x15   UBE    2'/2' fwd/retro 2'/2' fwd/retro 3'/3' 3'/3' 3'/3'  3'/3'   Ther Activity                                       Gait Training                                       Modalities

## 2023-11-28 ENCOUNTER — OFFICE VISIT (OUTPATIENT)
Dept: PHYSICAL THERAPY | Facility: CLINIC | Age: 48
End: 2023-11-28
Payer: OTHER MISCELLANEOUS

## 2023-11-28 DIAGNOSIS — G47.00 INSOMNIA, UNSPECIFIED TYPE: ICD-10-CM

## 2023-11-28 DIAGNOSIS — Z48.89 AFTERCARE FOLLOWING SURGERY: Primary | ICD-10-CM

## 2023-11-28 DIAGNOSIS — Z98.890 S/P ARTHROSCOPY OF LEFT SHOULDER: ICD-10-CM

## 2023-11-28 PROCEDURE — 97140 MANUAL THERAPY 1/> REGIONS: CPT | Performed by: PHYSICAL THERAPIST

## 2023-11-28 PROCEDURE — 97110 THERAPEUTIC EXERCISES: CPT | Performed by: PHYSICAL THERAPIST

## 2023-11-28 PROCEDURE — 97112 NEUROMUSCULAR REEDUCATION: CPT | Performed by: PHYSICAL THERAPIST

## 2023-11-28 RX ORDER — ONDANSETRON 4 MG/1
TABLET, ORALLY DISINTEGRATING ORAL
Qty: 30 TABLET | Refills: 0 | Status: SHIPPED | OUTPATIENT
Start: 2023-11-28

## 2023-11-28 NOTE — PROGRESS NOTES
Daily Note     Today's date: 2023  Patient name: Rodrigue Figueroa  : 1975  MRN: [id number]  Referring provider: Epifanio Urban PA-C  Dx:   Encounter Diagnosis     ICD-10-CM    1. Aftercare following surgery  Z48.89       2. S/P arthroscopy of left shoulder  Z98.890                      Subjective: Pt offers no new complaints. Objective: See treatment diary below      Assessment: Further assessment of the thoracic and cervical spine today as abd ROM still limited to 117d. Pt demonstrated significant sensitivity of the L cervical and thoracic spine with palpation, allodynia to light touch in fact. Unable to find a manual therapy technique to help reduce, and instead opted to continue strengthening routine in hopes that this increased sensitivity will resolve on its own. Pt able to complete all strengthening exercises with minimal limitation and increased strength compared to previous visits. Patient would benefit from continued PT      Plan: Continue per plan of care. Progress treatment as tolerated. Precautions: previous R shld surgeries    POC expires Auth Status Unit limit Start date  Expiration date PT/OT + Visit Limit?    23 45 visits 4 6/19/23 10/15/23 BOMN                                         Manuals 11/21 10/2 10/5 10/9 10/11 10/16 10/23 11/2 11/7 11/21   Phys mobs  Flex, abd, ER  TARUN gd 2 PROM    All planes    LA ER tarun gd IV 6x1' 61d to 75d post treatment         Cervical side glide             Scar mob             Post glide end range abd    TARUN 2x30" gd IV         Inf roll and glide end range abduction    TARUN 4x30" gd IV         Reassessment of ROM/MMT to discern abduction restriction      TARUN - 20'- lack of strength, ROM WNL       Assessment of cervical spine Cervical and thoracic TARUN        TARUN    Neuro Re-Ed             TB row  GTB 2x10 GTB  20x  Sun 20# 2x15        TB shld ext   GTB  20x          Prone Ext 2x15 4#    2x15 2x15 3# 2x15 3# 2x15 3# 3x10  2x15 4#   Prone T 2x15 4#    2x15 2x15 2x15 2x15 3# 3x10  2x15 4#   TB shld ER [place] 5# 2x15    2x15 RTB  Sun 3# 2x20 [place] 4# 4x10  Sun 4# 3x15   TB shld IR     2x15 RTB  [place] 3# 2x20 [place] 4# 2x20  Sun 5# 3x15                Ther Ex             Pt edu         TARUN    pendulums             Elbow ROM             Wrist ROM             Digiflex             Web ext             Scap retraction             Cane ER             Cane flex             Standing cane flexion  1x15 :05+  20x          Standing cane abduction  1x15 :05+  20x  Ecc 2x15        Finger ladder             Bicep curl  2# 2x25 3#  25x2          Skull crushers   3#  25x2          scap protraction in supine   3#  25x2          Supine shld flex   3#  25x2          Pulley's  2x10 :10+  5' 3' 3'        Wall push-up     2x10  On plinth 3x12      Seated ER 90/90  4# 3x15     1-3# 1x20, 1x15, 1x15 3# 3x10  3# 3x10 4# 3x15   Standing shld abd 4# 3x15        1# 3x10  4# 3x15   UBE 3'/3'   2'/2' fwd/retro 2'/2' fwd/retro 3'/3' 3'/3' 3'/3'  3'/3'   Ther Activity                                       Gait Training                                       Modalities

## 2023-12-07 ENCOUNTER — OFFICE VISIT (OUTPATIENT)
Dept: OBGYN CLINIC | Facility: CLINIC | Age: 48
End: 2023-12-07
Payer: OTHER MISCELLANEOUS

## 2023-12-07 VITALS
BODY MASS INDEX: 26.6 KG/M2 | WEIGHT: 190 LBS | SYSTOLIC BLOOD PRESSURE: 142 MMHG | HEART RATE: 71 BPM | HEIGHT: 71 IN | DIASTOLIC BLOOD PRESSURE: 90 MMHG

## 2023-12-07 DIAGNOSIS — Z98.890 S/P ARTHROSCOPY OF LEFT SHOULDER: Primary | ICD-10-CM

## 2023-12-07 PROCEDURE — 99213 OFFICE O/P EST LOW 20 MIN: CPT | Performed by: ORTHOPAEDIC SURGERY

## 2023-12-07 NOTE — LETTER
December 7, 2023     Patient: Dot Harris  YOB: 1975  Date of Visit: 12/7/2023      To Whom it May Concern:    Dot Harris is under my professional care. Doug Wu was seen in my office on 12/7/2023. Doug Wu may return to work 12/11/23 with full, unrestricted duty. If you have any questions or concerns, please don't hesitate to call.          Sincerely,          Kirk Hairston DO        CC: No Recipients

## 2023-12-07 NOTE — PROGRESS NOTES
Patient Name:  Aamir Orona  MRN:  [id number]    [id number] [place] [id number]. S/P arthroscopy of left shoulder      46 y.o. male approximately 6 month s/p Left  shoulder arthroscopic rotator cuff repair, open subpectoral biceps tenodesis, acromioplasty, extensive debridement performed on 06/09/2023   Patient doing well s/p surgical intervention  He may return to work with unrestricted duty. A note was provided  Continue home exercises  OTC analgesics as needed for pain management  He will follow up on an as needed basis    History of the Present Illness   Aamir Orona is a 46 y.o. male approximately 6 month s/p Left  shoulder arthroscopic rotator cuff repair, open subpectoral biceps tenodesis, acromioplasty, extensive debridement performed on 06/09/2023. He has been moving, chopping, stacking wood for the past month and a half. He is having no pain at this time. He is requesting to go back to work at full duty. He has resumed his normal ADLs. Review of Systems     Review of Systems   Constitutional:  Negative for chills and fever. HENT:  Negative for ear pain and sore throat. Eyes:  Negative for pain and visual disturbance. Respiratory:  Negative for cough and shortness of breath. Cardiovascular:  Negative for chest pain and palpitations. Gastrointestinal:  Negative for abdominal pain and vomiting. Genitourinary:  Negative for dysuria and hematuria. Musculoskeletal:  Negative for arthralgias and back pain. Skin:  Negative for color change and rash. Neurological:  Negative for seizures and syncope. All other systems reviewed and are negative.       Physical Exam     /90   Pulse 71   Ht 5' 11" (1.803 m)   Wt 86.2 kg (190 lb)   BMI 26.50 kg/m²     Left  Shoulder:   Surgical incisions well healed  Active range of motion   170 degrees forward flexion  160 degrees abduction  70 degrees external rotation   Lower thoracic internal rotation    There is no tenderness present over the shoulder. There is 4+/5 strength with supraspinatus testing. There is 5/5 strength with infraspinatus testing. There is 5/5 strength with subscapularis testing. The patient is neurovascularly intact distally in the extremity. Data Review     I have personally reviewed pertinent films in PACS, and my interpretation follows. No new images today. Social History     Tobacco Use   • Smoking status: Former     Packs/day: 1.00     Years: 20.00     Total pack years: 20.00     Types: Cigarettes     Quit date: 3/15/2021     Years since quittin.7   • Smokeless tobacco: Never   Vaping Use   • Vaping Use: Some days   • Substances: Nicotine   Substance Use Topics   • Alcohol use:  Yes     Alcohol/week: 2.0 standard drinks of alcohol     Types: 2 Cans of beer per week     Comment: on weekends   • Drug use: Never           Procedures  None    Nguyen Zepeda   Scribe Attestation    I,:  Nguyen Zepeda am acting as a scribe while in the presence of the attending physician.:       I,:  Rosetta Gutierrez, DO personally performed the services described in this documentation    as scribed in my presence.:

## 2023-12-10 DIAGNOSIS — I10 ESSENTIAL HYPERTENSION: ICD-10-CM

## 2023-12-10 RX ORDER — AMLODIPINE BESYLATE 5 MG/1
5 TABLET ORAL DAILY
Qty: 30 TABLET | Refills: 0 | Status: SHIPPED | OUTPATIENT
Start: 2023-12-10

## 2023-12-12 ENCOUNTER — TELEPHONE (OUTPATIENT)
Age: 48
End: 2023-12-12

## 2023-12-12 NOTE — TELEPHONE ENCOUNTER
Nydia Campo from [facility]    Doctor: Dr. Fatimah Webb    Reason for call: Fax OVN and work letter from 12/7/23 to number below.     Jesse Campo  Fax: [phone]    Call back#: [phone]

## 2023-12-13 ENCOUNTER — OFFICE VISIT (OUTPATIENT)
Dept: FAMILY MEDICINE CLINIC | Facility: CLINIC | Age: 48
End: 2023-12-13
Payer: COMMERCIAL

## 2023-12-13 VITALS
HEIGHT: 71 IN | HEART RATE: 78 BPM | BODY MASS INDEX: 26.04 KG/M2 | SYSTOLIC BLOOD PRESSURE: 114 MMHG | DIASTOLIC BLOOD PRESSURE: 64 MMHG | WEIGHT: 186 LBS | OXYGEN SATURATION: 98 %

## 2023-12-13 DIAGNOSIS — E78.1 HYPERTRIGLYCERIDEMIA: ICD-10-CM

## 2023-12-13 DIAGNOSIS — E78.2 MIXED HYPERLIPIDEMIA: ICD-10-CM

## 2023-12-13 DIAGNOSIS — I10 PRIMARY HYPERTENSION: Primary | ICD-10-CM

## 2023-12-13 PROCEDURE — 99213 OFFICE O/P EST LOW 20 MIN: CPT | Performed by: FAMILY MEDICINE

## 2023-12-13 NOTE — PROGRESS NOTES
BMI Counseling: Body mass index is 25.94 kg/m². The BMI is above normal. Nutrition recommendations include decreasing portion sizes, decreasing fast food intake, limiting drinks that contain sugar, moderation in carbohydrate intake, increasing intake of lean protein and reducing intake of cholesterol. Exercise recommendations include moderate physical activity 150 minutes/week and exercising 3-5 times per week. No pharmacotherapy was ordered. Rationale for BMI follow-up plan is due to patient being overweight or obese. Depression Screening and Follow-up Plan: Patient was screened for depression during today's encounter. They screened negative with a PHQ-2 score of 0. Assessment/Plan:     Chronic Problems:  No problem-specific Assessment & Plan notes found for this encounter. Visit Diagnosis:  Diagnoses and all orders for this visit:    Primary hypertension      Htn   stable with multiple readings , and in review of outside records , to continue present regimen     Subjective:    Patient ID: Ifeanyi Moon is a 46 y.o. male.     F/u   Failed DOT  pe   Due to BP  at time of eval 182 /122   But has been checking numbers at home with no numbers in excess of 140 /   Has had multiple appointments with variety of providers endocrinologist orthopedics numbers fairly consistent 1 20-1 40 over 80s  Meds lisinopril 20 mg p.o. daily amlodipine 5 mg p.o. daily taken daily negative missed doses  Asymptomatic negative headache, neg vision changes  chest pain palpitation shortness of breath difficulty breathing  Endocrine treating for pancreatitis , reducing intake of insulin , continues with jardiance , non diabetic             The following portions of the patient's history were reviewed and updated as appropriate: allergies, current medications, past family history, past medical history, past social history, past surgical history and problem list.    Review of Systems   Constitutional:  Negative for appetite change, chills, fever and unexpected weight change. HENT:  Negative for congestion, dental problem, ear pain, hearing loss, postnasal drip, rhinorrhea, sinus pressure, sinus pain, sneezing, sore throat, tinnitus and voice change. Eyes:  Negative for visual disturbance. Respiratory:  Negative for apnea, cough, chest tightness and shortness of breath. Cardiovascular:  Negative for chest pain, palpitations and leg swelling. Gastrointestinal:  Negative for abdominal pain, blood in stool, constipation, diarrhea, nausea and vomiting. Endocrine: Negative for cold intolerance, heat intolerance, polydipsia, polyphagia and polyuria. Genitourinary:  Negative for decreased urine volume, difficulty urinating, dysuria, frequency and hematuria. Musculoskeletal:  Negative for arthralgias, back pain, gait problem, joint swelling and myalgias. Skin:  Negative for color change, rash and wound. Allergic/Immunologic: Negative for environmental allergies and food allergies. Neurological:  Negative for dizziness, syncope, weakness, light-headedness, numbness and headaches. Hematological:  Negative for adenopathy. Does not bruise/bleed easily. Psychiatric/Behavioral:  Negative for sleep disturbance and suicidal ideas. The patient is not nervous/anxious.           /64   Pulse 78   Ht 5' 11" (1.803 m)   Wt 84.4 kg (186 lb)   SpO2 98%   BMI 25.94 kg/m²   Social History     Socioeconomic History    Marital status: /Civil Union     Spouse name: Not on file    Number of children: Not on file    Years of education: Not on file    Highest education level: Not on file   Occupational History    Occupation: fulltime employment   Tobacco Use    Smoking status: Former     Current packs/day: 0.00     Average packs/day: 1 pack/day for 20.0 years (20.0 ttl pk-yrs)     Types: Cigarettes     Start date: 3/15/2001     Quit date: 3/15/2021     Years since quittin.7    Smokeless tobacco: Never   Vaping Use    Vaping status: Some Days    Substances: Nicotine   Substance and Sexual Activity    Alcohol use: Yes     Alcohol/week: 2.0 standard drinks of alcohol     Types: 2 Cans of beer per week     Comment: on weekends    Drug use: Never    Sexual activity: Yes     Partners: Female   Other Topics Concern    Not on file   Social History Narrative    Lives with family    Daily caffeine consumption     Social Determinants of Health     Financial Resource Strain: Not on file   Food Insecurity: No Food Insecurity (2/8/2023)    Hunger Vital Sign     Worried About Running Out of Food in the Last Year: Never true     Ran Out of Food in the Last Year: Never true   Transportation Needs: No Transportation Needs (2/8/2023)    PRAPARE - Transportation     Lack of Transportation (Medical): No     Lack of Transportation (Non-Medical):  No   Physical Activity: Not on file   Stress: Not on file   Social Connections: Not on file   Intimate Partner Violence: Not on file   Housing Stability: Low Risk  (2/8/2023)    Housing Stability Vital Sign     Unable to Pay for Housing in the Last Year: No     Number of Places Lived in the Last Year: 1     Unstable Housing in the Last Year: No     Past Medical History:   Diagnosis Date    Arthritis     Asthma     Chronic pain disorder     Chronic pancreatitis ([address])     Cirrhosis ([address])     early cirrhosis    GERD (gastroesophageal reflux disease)     History of COVID-19 01/2022    mild s/s    Hyperlipidemia     Hypertension     Liver abscess     Liver disease     Meniscus tear     left knee work injury last assessed 08/24/2016    Pancreatitis     Pneumonia     Rotator cuff tear      Family History   Problem Relation Age of Onset    Cirrhosis Mother     Heart disease Other         cardiac disorder    Cancer Other      Past Surgical History:   Procedure Laterality Date    CELIAC PLEXUS BLOCK Bilateral 10/29/2018    Procedure: SPLANCHNIC NERVE BLOCK;  Surgeon: Andreina Yost MD;  [facility];  Service: Pain Management     CELIAC PLEXUS BLOCK Bilateral 01/10/2019    Procedure: SPLANCHNIC NERVE BLOCK;  Surgeon: Adele Joyner MD;  [facility];  Service: Pain Management     CHOLECYSTECTOMY      COLONOSCOPY      ESOPHAGOGASTRODUODENOSCOPY N/A 11/16/2017    Procedure: ESOPHAGOGASTRODUODENOSCOPY (EGD); Surgeon: Mike Stokes MD;  [facility]; Service: Gastroenterology    ESOPHAGOGASTRODUODENOSCOPY N/A 04/19/2018    Procedure: ESOPHAGOGASTRODUODENOSCOPY (EGD); Surgeon: Lebron Barillas MD;  [facility]; Service: Gastroenterology    ESOPHAGOGASTRODUODENOSCOPY N/A 05/10/2018    Procedure: ESOPHAGOGASTRODUODENOSCOPY (EGD); Surgeon: Ricardo Whittington MD;  [facility];   Service: Gastroenterology    HERNIA REPAIR Bilateral 02/03/2023    Procedure: REPAIR HERNIA INGUINAL, LAPAROSCOPIC,;  Surgeon: Mj Ely DO;  [facility];  Service: General    IR TEMPORARY DIALYSIS CATHETER PLACEMENT  02/28/2019    KNEE ARTHROSCOPY Bilateral     KNEE ARTHROSCOPY Right 2009    cibishino last assessed 08/24/2016    KNEE ARTHROSCOPY Right 07/01/2019    LIVER SURGERY      NERVE BLOCK Bilateral 05/29/2018    Procedure: SPLANCHNIC NERVE BLOCK;  Surgeon: Adele Joyner MD;  [facility];  Service: Pain Management     PANCREAS SURGERY      stents    PANCREATIC CYST EXCISION      [place] INJECTION ANES LMBR/THRC PARAVERTBRL SYMPATHETIC Bilateral 12/28/2017    Procedure: SPLANCHNIC NERVE BLOCK;  Surgeon: Adele Joyner MD;  [facility];  Service: Pain Management     W[place] INJX ANES CELIAC PLEXUS W/WO RADIOLOGIC MONITRNG Bilateral 05/09/2017    Procedure: CELIAC PLEXUS BLOCK ;  Surgeon: Adele Joyner MD;  [facility];  Service: Pain Management     [place] INJX ANES CELIAC PLEXUS W/WO RADIOLOGIC MONITRNG Bilateral 06/01/2017    Procedure: SPLANCHNIC NERVE BLOCK at T12;  Surgeon: Adele Joyner MD;  [facility];  Service: Pain Management     [place] INJX ANES CELIAC PLEXUS W/WO RADIOLOGIC MONITRNG Bilateral 08/08/2017    Procedure: BILATERAL SPLANCHNIC NERVE BLOCK T12;  Surgeon: Gertrude Whelan MD;  [facility];  Service: Pain Management     [place] [address]/WO RADIOLOGIC MONITRNG Bilateral 04/18/2019    Procedure: BLOCK / INJECTION CELIAC PLEXUS;  Surgeon: Gertrude Whelan MD;  [facility];  Service: Pain Management     [place] [address]/WO RADIOLOGIC MONITRNG Bilateral 08/20/2019    Procedure: SPLANCHNIC NERVE BLOCK;  Surgeon: Gertrude Whelan MD;  [facility];  Service: Pain Management     [place] [address]/WO RADIOLOGIC MONITRNG Bilateral 10/17/2019    Procedure: SPLANCHNIC NERVE BLOCK;  Surgeon: Gertrude Whelan MD;  [facility];  Service: Pain Management     [place] LAPAROSCOPY SURG CHOLECYSTECTOMY N/A 02/14/2017    Procedure: LAPAROSCOPIC CHOLECYSTECTOMY, IOC, POSSIBLE OPEN.;  Surgeon: Kera Tristan MD;  [facility];  Service: General    [place] SURGICAL ARTHROSCOPY SHOULDER W/ROTATOR CUFF RPR Left 06/09/2023    Procedure: Left Shoulder Arthroscopic Rotator Cuff Repair, Open Subpectoral Biceps Tenodesis, Acromioplasty, Extensive Debridement;  Surgeon: Jerry Hart DO;  [facility];  Service: Orthopedics    ROTATOR CUFF REPAIR Right     SHOULDER ARTHROSCOPY      SHOULDER ARTHROSCOPY Right     SHOULDER SURGERY         Current Outpatient Medications:     albuterol (PROVENTIL HFA,VENTOLIN HFA) 90 mcg/act inhaler, INHALE TWO PUFFS BY MOUTH EVERY 6 HOURS AS NEEDED FOR WHEEZING, Disp: 54 g, Rfl: 0    amLODIPine (NORVASC) 5 mg tablet, TAKE ONE TABLET BY MOUTH EVERY DAY, Disp: 30 tablet, Rfl: 0    Choline Fenofibrate (Fenofibric Acid) 135 MG CPDR, TAKE ONE CAPSULE BY MOUTH ONCE A DAY, Disp: 90 capsule, Rfl: 0    Empagliflozin 10 MG TABS, Take 10 mg by mouth every morning, Disp: , Rfl:     esomeprazole (NexIUM) 40 MG capsule, Take 1 capsule (40 mg total) by mouth 2 (two) times a day before meals, Disp: 60 capsule, Rfl: 5 famotidine (PEPCID) 40 MG tablet, Take 40 mg by mouth daily at bedtime as needed , Disp: , Rfl:     insulin degludec [place] FlexTouch) 100 units/mL injection pen, 20 units subcut in hs, Disp: , Rfl:     lisinopril (ZESTRIL) 20 mg tablet, TAKE ONE TABLET BY MOUTH EVERY DAY, Disp: 90 tablet, Rfl: 0    MILK THISTLE PO, Take by mouth in the morning, Disp: , Rfl:     Multiple Vitamins-Minerals (MULTIVITAMIN ADULTS PO), Take by mouth daily after breakfast, Disp: , Rfl:     NON FORMULARY, Pantessin 1 capsule daily, Disp: , Rfl:     omega-3-acid ethyl esters (LOVAZA) 1 g capsule, TAKE TWO CAPSULES BY MOUTH TWICE DAILY, Disp: 360 capsule, Rfl: 0    ondansetron (ZOFRAN-ODT) 4 mg disintegrating tablet, DISSOLVE ONE TABLET IN MOUTH EVERY EIGHT HOURS AS NEEDED NAUSEA AND VOMITING, Disp: 30 tablet, Rfl: 0    Pancrelipase, Lip-Prot-Amyl, (Creon) 35576-795926 units CPEP, Take 1 capsule (36,000 Units total) by mouth 3 (three) times a day before meals, Disp: 90 capsule, Rfl: 0    propranolol (INDERAL) 20 mg tablet, TAKE 1 TABLET BY MOUTH TWICE DAILY, Disp: 180 tablet, Rfl: 3    rosuvastatin (CRESTOR) 40 MG tablet, TAKE ONE TABLET BY MOUTH ONCE A DAY, Disp: 90 tablet, Rfl: 0    traZODone (DESYREL) 50 mg tablet, Take 1 tablet (50 mg total) by mouth daily at bedtime (Patient taking differently: Take 50 mg by mouth if needed), Disp: 30 tablet, Rfl: 0    TURMERIC PO, Take by mouth in the morning, Disp: , Rfl:     ibuprofen (MOTRIN) 800 mg tablet, Take 1 tablet (800 mg total) by mouth every 8 (eight) hours as needed for mild pain Take 1 tablet by mouth 3 times daily for 3 days, then take as needed. Take with food and water.  Discontinue if stomach upset occurs (Patient not taking: Reported on 7/27/2023), Disp: 30 tablet, Rfl: 0    Insulin Pen Needle (Pen Needles) 32G X 5 MM MISC, use once daily as directed, Disp: , Rfl:     pancrelipase, Lip-Prot-Amyl, (Creon) 12,000 units capsule, Take 36,000 units of lipase by mouth 3 (three) times a day with meals (Patient taking differently: Take 36,000 units of lipase by mouth 3 (three) times a day with meals Snacks only), Disp: 270 capsule, Rfl: 2    Allergies   Allergen Reactions    Bee Venom Swelling          Lab Review   No visits with results within 6 Month(s) from this visit. Latest known visit with results is:   Admission on 06/09/2023, Discharged on 06/09/2023   Component Date Value    POC Glucose 06/09/2023 139         Imaging: No results found. Objective:     Physical Exam  Constitutional:       General: He is not in acute distress. Appearance: He is well-developed. He is not ill-appearing or toxic-appearing. HENT:      Head: Normocephalic and atraumatic. Cardiovascular:      Rate and Rhythm: Normal rate and regular rhythm. Heart sounds: Normal heart sounds. Pulmonary:      Effort: Pulmonary effort is normal.      Breath sounds: Normal breath sounds. Musculoskeletal:         General: Normal range of motion. Cervical back: Normal range of motion and neck supple. Skin:     General: Skin is warm and dry. Neurological:      Mental Status: He is alert and oriented to person, place, and time. Deep Tendon Reflexes: Reflexes are normal and symmetric. Psychiatric:         Behavior: Behavior normal.         Thought Content: Thought content normal.         Judgment: Judgment normal.           There are no Patient Instructions on file for this visit. LUANA Baez    Portions of the record may have been created with voice recognition software. Occasional wrong word or "sound a like" substitutions may have occurred due to the inherent limitations of voice recognition software. Read the chart carefully and recognize, using context, where substitutions have occurred.

## 2023-12-14 RX ORDER — FENOFIBRIC ACID 135 MG/1
1 CAPSULE, DELAYED RELEASE ORAL DAILY
Qty: 90 CAPSULE | Refills: 0 | Status: SHIPPED | OUTPATIENT
Start: 2023-12-14

## 2024-01-04 DIAGNOSIS — E78.2 MIXED HYPERLIPIDEMIA: ICD-10-CM

## 2024-01-04 RX ORDER — OMEGA-3-ACID ETHYL ESTERS 1 G/1
CAPSULE, LIQUID FILLED ORAL
Qty: 360 CAPSULE | Refills: 0 | Status: SHIPPED | OUTPATIENT
Start: 2024-01-04

## 2024-01-06 DIAGNOSIS — E78.2 MIXED HYPERLIPIDEMIA: ICD-10-CM

## 2024-01-06 DIAGNOSIS — I10 ESSENTIAL HYPERTENSION: ICD-10-CM

## 2024-01-08 PROCEDURE — 4010F ACE/ARB THERAPY RXD/TAKEN: CPT

## 2024-01-08 RX ORDER — LISINOPRIL 20 MG/1
20 TABLET ORAL DAILY
Qty: 90 TABLET | Refills: 0 | Status: SHIPPED | OUTPATIENT
Start: 2024-01-08

## 2024-01-08 RX ORDER — ROSUVASTATIN CALCIUM 40 MG/1
40 TABLET, COATED ORAL DAILY
Qty: 90 TABLET | Refills: 0 | Status: SHIPPED | OUTPATIENT
Start: 2024-01-08

## 2024-01-09 ENCOUNTER — OFFICE VISIT (OUTPATIENT)
Dept: FAMILY MEDICINE CLINIC | Facility: CLINIC | Age: 49
End: 2024-01-09
Payer: COMMERCIAL

## 2024-01-09 VITALS
SYSTOLIC BLOOD PRESSURE: 138 MMHG | HEIGHT: 71 IN | HEART RATE: 81 BPM | BODY MASS INDEX: 25.9 KG/M2 | OXYGEN SATURATION: 95 % | WEIGHT: 185 LBS | DIASTOLIC BLOOD PRESSURE: 74 MMHG

## 2024-01-09 DIAGNOSIS — E11.65 CONTROLLED TYPE 2 DIABETES MELLITUS WITH HYPERGLYCEMIA, WITH LONG-TERM CURRENT USE OF INSULIN (HCC): ICD-10-CM

## 2024-01-09 DIAGNOSIS — J01.00 ACUTE NON-RECURRENT MAXILLARY SINUSITIS: ICD-10-CM

## 2024-01-09 DIAGNOSIS — R04.2 SPITTING UP BLOOD: Primary | ICD-10-CM

## 2024-01-09 DIAGNOSIS — R42 DIZZY: ICD-10-CM

## 2024-01-09 DIAGNOSIS — K75.0 LIVER ABSCESS: ICD-10-CM

## 2024-01-09 DIAGNOSIS — Z79.4 CONTROLLED TYPE 2 DIABETES MELLITUS WITH HYPERGLYCEMIA, WITH LONG-TERM CURRENT USE OF INSULIN (HCC): ICD-10-CM

## 2024-01-09 DIAGNOSIS — K86.1 CHRONIC PANCREATITIS, UNSPECIFIED PANCREATITIS TYPE (HCC): ICD-10-CM

## 2024-01-09 PROBLEM — F11.20 UNCOMPLICATED OPIOID DEPENDENCE (HCC): Status: RESOLVED | Noted: 2018-06-28 | Resolved: 2024-01-09

## 2024-01-09 PROCEDURE — 99214 OFFICE O/P EST MOD 30 MIN: CPT

## 2024-01-09 RX ORDER — AMOXICILLIN AND CLAVULANATE POTASSIUM 875; 125 MG/1; MG/1
1 TABLET, FILM COATED ORAL EVERY 12 HOURS SCHEDULED
Qty: 20 TABLET | Refills: 0 | Status: SHIPPED | OUTPATIENT
Start: 2024-01-09 | End: 2024-01-19

## 2024-01-09 NOTE — ASSESSMENT & PLAN NOTE
Continue current treatment plan, hold insulin for low blood sugars.   Lab Results   Component Value Date    HGBA1C 4.9 12/12/2023

## 2024-01-09 NOTE — PROGRESS NOTES
Assessment/Plan:         Problem List Items Addressed This Visit        Digestive    Chronic pancreatitis (HCC)     Following closely with GI, feels stable at this time.          Liver abscess     Following closely with GI, feels stable at this time.             Endocrine    Controlled type 2 diabetes mellitus with hyperglycemia, with long-term current use of insulin (HCC)     Continue current treatment plan, hold insulin for low blood sugars.   Lab Results   Component Value Date    HGBA1C 4.9 12/12/2023         Other Visit Diagnoses     Spitting up blood    -  Primary    Have lab work, strongly recommend following up with GI    Relevant Orders    Ambulatory Referral to Gastroenterology    Protime-INR    Dizzy        Have lab work, will call with results.    Relevant Orders    CBC and differential    Comprehensive metabolic panel    TSH, 3rd generation with Free T4 reflex    Magnesium    Iron Panel (Includes Ferritin, Iron Sat%, Iron, and TIBC)    Acute non-recurrent maxillary sinusitis        Recommend humidification, augmentin twice daily for 10 days.    Relevant Medications    amoxicillin-clavulanate (AUGMENTIN) 875-125 mg per tablet              Subjective:      Patient ID: Ashwin Wright is a 48 y.o. male.    Kirill has been sick for a few weeks, he has had a lot of nose bleeds and is spitting out a lot of blood. He is spitting up blood every morning.         The following portions of the patient's history were reviewed and updated as appropriate:   Past Medical History:  He has a past medical history of Arthritis, Asthma, Chronic pain disorder, Chronic pancreatitis (HCC), Cirrhosis (HCC), GERD (gastroesophageal reflux disease), History of COVID-19 (01/2022), Hyperlipidemia, Hypertension, Liver abscess, Liver disease, Meniscus tear, Pancreatitis, Pneumonia, and Rotator cuff tear.,  _______________________________________________________________________  Medical Problems:  does not have any pertinent problems  on file.,  _______________________________________________________________________  Past Surgical History:   has a past surgical history that includes Rotator cuff repair (Right); Pancreas surgery; Cholecystectomy; pr laparoscopy surg cholecystectomy (N/A, 02/14/2017); Liver surgery; pr injx anes celiac plexus w/wo radiologic monitrng (Bilateral, 05/09/2017); pr injx anes celiac plexus w/wo radiologic monitrng (Bilateral, 06/01/2017); pr injx anes celiac plexus w/wo radiologic monitrng (Bilateral, 08/08/2017); Esophagogastroduodenoscopy (N/A, 11/16/2017); pr injection anes lmbr/thrc paravertbrl sympathetic (Bilateral, 12/28/2017); Knee arthroscopy (Bilateral); Shoulder arthroscopy; Shoulder arthroscopy (Right); Esophagogastroduodenoscopy (N/A, 04/19/2018); Esophagogastroduodenoscopy (N/A, 05/10/2018); Knee arthroscopy (Right, 2009); NERVE BLOCK (Bilateral, 05/29/2018); Celiac plexus block (Bilateral, 10/29/2018); Celiac plexus block (Bilateral, 01/10/2019); IR temporary dialysis catheter placement (02/28/2019); pr injx anes celiac plexus w/wo radiologic monitrng (Bilateral, 04/18/2019); Pancreatic cyst excision; Knee arthroscopy (Right, 07/01/2019); pr injx anes celiac plexus w/wo radiologic monitrng (Bilateral, 08/20/2019); pr injx anes celiac plexus w/wo radiologic monitrng (Bilateral, 10/17/2019); Colonoscopy; Hernia repair (Bilateral, 02/03/2023); pr surgical arthroscopy shoulder w/rotator cuff rpr (Left, 06/09/2023); and Shoulder surgery.,  _______________________________________________________________________  Family History:  family history includes Cancer in his other; Cirrhosis in his mother; Heart disease in his other.,  _______________________________________________________________________  Social History:   reports that he quit smoking about 2 years ago. His smoking use included cigarettes. He started smoking about 22 years ago. He has a 20.0 pack-year smoking history. He has never used smokeless  tobacco. He reports current alcohol use of about 2.0 standard drinks of alcohol per week. He reports that he does not use drugs.,  _______________________________________________________________________  Allergies:  is allergic to bee venom..  _______________________________________________________________________  Current Outpatient Medications   Medication Sig Dispense Refill   • albuterol (PROVENTIL HFA,VENTOLIN HFA) 90 mcg/act inhaler INHALE TWO PUFFS BY MOUTH EVERY 6 HOURS AS NEEDED FOR WHEEZING 54 g 0   • amLODIPine (NORVASC) 5 mg tablet TAKE ONE TABLET BY MOUTH EVERY DAY 30 tablet 0   • amoxicillin-clavulanate (AUGMENTIN) 875-125 mg per tablet Take 1 tablet by mouth every 12 (twelve) hours for 10 days 20 tablet 0   • Choline Fenofibrate (Fenofibric Acid) 135 MG CPDR TAKE ONE CAPSULE BY MOUTH EVERY DAY 90 capsule 0   • Empagliflozin 10 MG TABS Take 10 mg by mouth every morning     • esomeprazole (NexIUM) 40 MG capsule Take 1 capsule (40 mg total) by mouth 2 (two) times a day before meals 60 capsule 5   • famotidine (PEPCID) 40 MG tablet Take 40 mg by mouth daily at bedtime as needed      • insulin degludec ([place] FlexTouch) 100 units/mL injection pen 20 units subcut in hs     • Insulin Pen Needle (Pen Needles) 32G X 5 MM MISC use once daily as directed     • lisinopril (ZESTRIL) 20 mg tablet TAKE ONE TABLET BY MOUTH EVERY DAY 90 tablet 0   • MILK THISTLE PO Take by mouth in the morning     • Multiple Vitamins-Minerals (MULTIVITAMIN ADULTS PO) Take by mouth daily after breakfast     • NON FORMULARY Pantessin 1 capsule daily     • omega-3-acid ethyl esters (LOVAZA) 1 g capsule TAKE TWO CAPSULES BY MOUTH TWICE DAILY 360 capsule 0   • ondansetron (ZOFRAN-ODT) 4 mg disintegrating tablet DISSOLVE ONE TABLET IN MOUTH EVERY EIGHT HOURS AS NEEDED NAUSEA AND VOMITING 30 tablet 0   • Pancrelipase, Lip-Prot-Amyl, (Creon) 94313-099848 units CPEP Take 1 capsule (36,000 Units total) by mouth 3 (three) times a day before  meals 90 capsule 0   • propranolol (INDERAL) 20 mg tablet TAKE 1 TABLET BY MOUTH TWICE DAILY 180 tablet 3   • rosuvastatin (CRESTOR) 40 MG tablet TAKE ONE TABLET BY MOUTH EVERY DAY 90 tablet 0   • traZODone (DESYREL) 50 mg tablet Take 1 tablet (50 mg total) by mouth daily at bedtime (Patient taking differently: Take 50 mg by mouth if needed) 30 tablet 0   • TURMERIC PO Take by mouth in the morning     • ibuprofen (MOTRIN) 800 mg tablet Take 1 tablet (800 mg total) by mouth every 8 (eight) hours as needed for mild pain Take 1 tablet by mouth 3 times daily for 3 days, then take as needed. Take with food and water. Discontinue if stomach upset occurs (Patient not taking: Reported on 7/27/2023) 30 tablet 0   • pancrelipase, Lip-Prot-Amyl, (Creon) 12,000 units capsule Take 36,000 units of lipase by mouth 3 (three) times a day with meals (Patient taking differently: Take 36,000 units of lipase by mouth 3 (three) times a day with meals Snacks only) 270 capsule 2     No current facility-administered medications for this visit.     _______________________________________________________________________  Review of Systems   Constitutional:  Positive for chills, diaphoresis and fatigue. Negative for fever.   HENT:  Positive for congestion, ear pain, sinus pressure and sinus pain. Negative for sore throat.    Eyes:  Negative for pain and visual disturbance.   Respiratory:  Negative for cough, chest tightness, shortness of breath and wheezing.    Cardiovascular:  Negative for chest pain and palpitations.   Gastrointestinal:  Positive for nausea and vomiting. Negative for abdominal pain, constipation and diarrhea.   Genitourinary:  Negative for dysuria, frequency, hematuria and urgency.   Musculoskeletal:  Positive for arthralgias and back pain. Negative for myalgias.   Skin:  Negative for color change and rash.   Neurological:  Positive for dizziness and light-headedness. Negative for seizures, syncope and headaches.   All other  "systems reviewed and are negative.        Objective:  Vitals:    01/09/24 1300   BP: 138/74   BP Location: Left arm   Patient Position: Sitting   Cuff Size: Standard   Pulse: 81   SpO2: 95%   Weight: 83.9 kg (185 lb)   Height: 5' 11\" (1.803 m)     Body mass index is 25.8 kg/m².     Physical Exam  Vitals and nursing note reviewed.   Constitutional:       General: He is not in acute distress.     Appearance: Normal appearance. He is not ill-appearing.   HENT:      Head: Normocephalic.      Right Ear: Tympanic membrane, ear canal and external ear normal. There is no impacted cerumen.      Left Ear: Tympanic membrane, ear canal and external ear normal. There is no impacted cerumen.      Nose: Congestion present.      Mouth/Throat:      Mouth: Mucous membranes are moist.      Pharynx: Posterior oropharyngeal erythema present.   Eyes:      General:         Right eye: No discharge.         Left eye: No discharge.      Extraocular Movements: Extraocular movements intact.      Conjunctiva/sclera: Conjunctivae normal.      Pupils: Pupils are equal, round, and reactive to light.   Cardiovascular:      Rate and Rhythm: Normal rate and regular rhythm.      Pulses: Normal pulses.      Heart sounds: Normal heart sounds. No murmur heard.  Pulmonary:      Effort: Pulmonary effort is normal. No respiratory distress.      Breath sounds: Normal breath sounds. No wheezing.   Abdominal:      General: Abdomen is flat. Bowel sounds are normal.   Musculoskeletal:         General: Normal range of motion.      Cervical back: Normal range of motion.      Right lower leg: No edema.      Left lower leg: No edema.   Skin:     General: Skin is warm and dry.   Neurological:      General: No focal deficit present.      Mental Status: He is alert and oriented to person, place, and time.   Psychiatric:         Mood and Affect: Mood normal.         Behavior: Behavior normal.         "

## 2024-01-09 NOTE — LETTER
January 9, 2024     Patient: Ashwin Wright  YOB: 1975  Date of Visit: 1/9/2024      To Whom it May Concern:    Ashwin Wright is under my professional care. Ashwin was seen in my office on 1/9/2024. Ashwin may return to work on 1/12/24 .    If you have any questions or concerns, please don't hesitate to call.         Sincerely,          LUANA Morrissey        CC: No Recipients

## 2024-01-09 NOTE — PATIENT INSTRUCTIONS
Problem List Items Addressed This Visit          Digestive    Chronic pancreatitis (HCC)     Following closely with GI, feels stable at this time.          Liver abscess     Following closely with GI, feels stable at this time.             Endocrine    Controlled type 2 diabetes mellitus with hyperglycemia, with long-term current use of insulin (HCC)     Continue current treatment plan, hold insulin for low blood sugars.   Lab Results   Component Value Date    HGBA1C 4.9 12/12/2023             Other Visit Diagnoses       Spitting up blood    -  Primary    Have lab work, strongly recommend following up with GI    Relevant Orders    Ambulatory Referral to Gastroenterology    Protime-INR    Dizzy        Have lab work, will call with results.    Relevant Orders    CBC and differential    Comprehensive metabolic panel    TSH, 3rd generation with Free T4 reflex    Magnesium    Iron Panel (Includes Ferritin, Iron Sat%, Iron, and TIBC)    Acute non-recurrent maxillary sinusitis        Recommend humidification, augmentin twice daily for 10 days.    Relevant Medications    amoxicillin-clavulanate (AUGMENTIN) 875-125 mg per tablet

## 2024-01-12 ENCOUNTER — TELEPHONE (OUTPATIENT)
Dept: FAMILY MEDICINE CLINIC | Facility: CLINIC | Age: 49
End: 2024-01-12

## 2024-01-12 NOTE — TELEPHONE ENCOUNTER
Wife called to ask why he needed to come in today to be seen since he only had the lab work done this morning.  Also needs his work note extended to return to work on Monday.    I called Yrn at home to ask if he spoke to the patient this morning and said we could extend the work note.    Yrn wanted him to do the labs today and follow up with Avis because of he is from bleeding somewhere.     He went to [facility] for labs this morning.  I called [facility] to see if we could get his lab results today, to make sure he didn't need to go to the ER.  They said it was not marked as STAT and the labs that were STAT already left to go to the main lab to be done.      Spoke with Avis regarding the lab results won't be in today and patient doesn't feel the need to come into the office until the lab results are done.  If the patient is bleeding somewhere maybe he should go to the ER to be evaluated.    Called wife and let her know the labs won't be in today and I suggested maybe he should to go to the ER.  She stated his gums are bleeding and he may need periodontal work done.  He is not spitting up blood, it is just his gums.     Work note extended until Monday, but if he does not get better he needs to call the office or go to the ER.

## 2024-01-13 LAB
ALBUMIN SERPL-MCNC: 4.7 G/DL (ref 3.6–5.1)
ALBUMIN/GLOB SERPL: 1.7 (CALC) (ref 1–2.5)
ALP SERPL-CCNC: 154 U/L (ref 35–144)
ALT SERPL-CCNC: 64 U/L (ref 9–46)
AST SERPL-CCNC: 206 U/L (ref 10–35)
BASOPHILS # BLD AUTO: 72 CELLS/UL (ref 0–200)
BASOPHILS NFR BLD AUTO: 1.6 %
BILIRUB SERPL-MCNC: 1.2 MG/DL (ref 0.2–1.2)
BUN SERPL-MCNC: 9 MG/DL (ref 7–25)
BUN/CREAT SERPL: 20 (CALC) (ref 6–22)
CALCIUM SERPL-MCNC: 9.5 MG/DL (ref 8.6–10.3)
CHLORIDE SERPL-SCNC: 102 MMOL/L (ref 98–110)
CO2 SERPL-SCNC: 23 MMOL/L (ref 20–32)
CREAT SERPL-MCNC: 0.46 MG/DL (ref 0.7–1.3)
EOSINOPHIL # BLD AUTO: 81 CELLS/UL (ref 15–500)
EOSINOPHIL NFR BLD AUTO: 1.8 %
ERYTHROCYTE [DISTWIDTH] IN BLOOD BY AUTOMATED COUNT: 13.3 % (ref 11–15)
FERRITIN SERPL-MCNC: 386 NG/ML (ref 38–380)
GFR/BSA.PRED SERPLBLD CYS-BASED-ARV: 127 ML/MIN/1.73M2
GLOBULIN SER CALC-MCNC: 2.7 G/DL (CALC) (ref 1.9–3.7)
GLUCOSE SERPL-MCNC: 106 MG/DL (ref 65–99)
HCT VFR BLD AUTO: 39.4 % (ref 38.5–50)
HGB BLD-MCNC: 13.6 G/DL (ref 13.2–17.1)
INR PPP: 1.2
IRON SATN MFR SERPL: 36 % (CALC) (ref 20–48)
IRON SERPL-MCNC: 130 MCG/DL (ref 50–180)
LYMPHOCYTES # BLD AUTO: 725 CELLS/UL (ref 850–3900)
LYMPHOCYTES NFR BLD AUTO: 16.1 %
MAGNESIUM SERPL-MCNC: 1.8 MG/DL (ref 1.5–2.5)
MCH RBC QN AUTO: 34.6 PG (ref 27–33)
MCHC RBC AUTO-ENTMCNC: 34.5 G/DL (ref 32–36)
MCV RBC AUTO: 100.3 FL (ref 80–100)
MONOCYTES # BLD AUTO: 441 CELLS/UL (ref 200–950)
MONOCYTES NFR BLD AUTO: 9.8 %
NEUTROPHILS # BLD AUTO: 3182 CELLS/UL (ref 1500–7800)
NEUTROPHILS NFR BLD AUTO: 70.7 %
PLATELET # BLD AUTO: 52 THOUSAND/UL (ref 140–400)
PMV BLD REES-ECKER: 12.8 FL (ref 7.5–12.5)
POTASSIUM SERPL-SCNC: 3.9 MMOL/L (ref 3.5–5.3)
PROT SERPL-MCNC: 7.4 G/DL (ref 6.1–8.1)
PROTHROMBIN TIME: 12 SEC (ref 9–11.5)
RBC # BLD AUTO: 3.93 MILLION/UL (ref 4.2–5.8)
SL AMB PLATELET ESTIMATION: ABNORMAL
SODIUM SERPL-SCNC: 139 MMOL/L (ref 135–146)
TIBC SERPL-MCNC: 364 MCG/DL (CALC) (ref 250–425)
TSH SERPL-ACNC: 1.42 MIU/L (ref 0.4–4.5)
WBC # BLD AUTO: 4.5 THOUSAND/UL (ref 3.8–10.8)

## 2024-01-26 DIAGNOSIS — G47.00 INSOMNIA, UNSPECIFIED TYPE: ICD-10-CM

## 2024-01-27 NOTE — DISCHARGE INSTRUCTIONS
Bilateral Splanchnic Nerve Block   DISCHARGE INSTRUCTIONS:   Seek care immediately if:   · Blood soaks through your bandage  · Your wound is red, swollen, or draining pus  · You have a fever or chills, severe back pain, and the procedure area is sensitive to the touch  · You have a seizure  · You have trouble moving your legs  · You have weakness or numbness in your legs  · You cannot control when you urinate or have a bowel movement  Contact your healthcare provider if:   · You have nausea or are vomiting  · Your face or neck is red for a few days and you feel warm  · You have more pain than you had before the procedure  · You have swelling in your hands or feet  · You have questions or concerns about your condition or care  Follow up with your healthcare provider as directed:  Write down your questions so you remember to ask them during your visits  Care for your wound as directed: You may remove the bandage before you go to bed the day of your procedure  You may take a shower, but do not take a bath for at least 24 hours  Self-care:   · Do not drive,  use machines, or do strenuous activity for 24 hours after your procedure or as directed  · Continue other treatments  as directed  The injections are meant to be used with other treatments, such as physical therapy  © 2017 [address] Information is for End User's use only and may not be sold, redistributed or otherwise used for commercial purposes  All illustrations and images included in CareNotes® are the copyrighted property of [employer]  or [employer]  The above information is an  only  It is not intended as medical advice for individual conditions or treatments  Talk to your doctor, nurse or pharmacist before following any medical regimen to see if it is safe and effective for you  3 = A little assistance

## 2024-01-29 ENCOUNTER — TELEPHONE (OUTPATIENT)
Dept: FAMILY MEDICINE CLINIC | Facility: CLINIC | Age: 49
End: 2024-01-29

## 2024-01-29 RX ORDER — ONDANSETRON 4 MG/1
TABLET, ORALLY DISINTEGRATING ORAL
Qty: 30 TABLET | Refills: 0 | Status: SHIPPED | OUTPATIENT
Start: 2024-01-29

## 2024-01-29 NOTE — TELEPHONE ENCOUNTER
Called and spoke with patient he said his head is stuffy and his ears are all stuffed up and nose bleeding again. He is not sure if he has the same thing again.

## 2024-01-29 NOTE — TELEPHONE ENCOUNTER
Patient called in regards to sinus medication he was prescribed at the last visit and now he's states that he is right back at square one as the symptoms is starting again. Patient stated he saw Keerthi last.   Please advise.  Thanks

## 2024-01-30 ENCOUNTER — OFFICE VISIT (OUTPATIENT)
Dept: FAMILY MEDICINE CLINIC | Facility: CLINIC | Age: 49
End: 2024-01-30
Payer: COMMERCIAL

## 2024-01-30 VITALS
HEART RATE: 68 BPM | BODY MASS INDEX: 26.18 KG/M2 | WEIGHT: 187 LBS | DIASTOLIC BLOOD PRESSURE: 74 MMHG | TEMPERATURE: 96.8 F | HEIGHT: 71 IN | SYSTOLIC BLOOD PRESSURE: 124 MMHG | OXYGEN SATURATION: 95 %

## 2024-01-30 DIAGNOSIS — J01.01 ACUTE RECURRENT MAXILLARY SINUSITIS: ICD-10-CM

## 2024-01-30 DIAGNOSIS — R09.81 NASAL CONGESTION: Primary | ICD-10-CM

## 2024-01-30 LAB
SARS-COV-2 AG UPPER RESP QL IA: NEGATIVE
SL AMB POCT RAPID FLU A: NEGATIVE
SL AMB POCT RAPID FLU B: NEGATIVE
VALID CONTROL: NORMAL

## 2024-01-30 PROCEDURE — 87811 SARS-COV-2 COVID19 W/OPTIC: CPT

## 2024-01-30 PROCEDURE — 99214 OFFICE O/P EST MOD 30 MIN: CPT

## 2024-01-30 PROCEDURE — 3078F DIAST BP <80 MM HG: CPT

## 2024-01-30 PROCEDURE — 87804 INFLUENZA ASSAY W/OPTIC: CPT

## 2024-01-30 PROCEDURE — 3074F SYST BP LT 130 MM HG: CPT

## 2024-01-30 RX ORDER — PREDNISONE 20 MG/1
40 TABLET ORAL DAILY
Qty: 10 TABLET | Refills: 0 | Status: SHIPPED | OUTPATIENT
Start: 2024-01-30 | End: 2024-02-04

## 2024-01-30 RX ORDER — DOXYCYCLINE HYCLATE 100 MG/1
100 CAPSULE ORAL EVERY 12 HOURS SCHEDULED
Qty: 20 CAPSULE | Refills: 0 | Status: SHIPPED | OUTPATIENT
Start: 2024-01-30 | End: 2024-02-09

## 2024-01-30 NOTE — PROGRESS NOTES
Assessment/Plan:         Problem List Items Addressed This Visit    None  Visit Diagnoses     Nasal congestion    -  Primary    Will treat with doxy and steroid burst, recommend hydration, steam and continue to rest.    Relevant Orders    POCT Rapid Covid Ag (Completed)    POCT rapid flu A and B (Completed)    Acute recurrent maxillary sinusitis        Will treat with doxy and steroid burst, recommend hydration, steam and continue to rest.    Relevant Medications    predniSONE 20 mg tablet    doxycycline hyclate (VIBRAMYCIN) 100 mg capsule            Subjective:      Patient ID: Ashwin Wright is a 48 y.o. male.    Kirill is here for URI symptoms for the last few days. HE is having hearing loss.     Shortness of Breath  Associated symptoms include dizziness. Pertinent negatives include no chest pain, coughing, fatigue, palpitations or sore throat.       The following portions of the patient's history were reviewed and updated as appropriate:   Past Medical History:  He has a past medical history of Arthritis, Asthma, Chronic pain disorder, Chronic pancreatitis (HCC), Cirrhosis (HCC), GERD (gastroesophageal reflux disease), History of COVID-19 (01/2022), Hyperlipidemia, Hypertension, Liver abscess, Liver disease, Meniscus tear, Pancreatitis, Pneumonia, and Rotator cuff tear.,  _______________________________________________________________________  Medical Problems:  does not have any pertinent problems on file.,  _______________________________________________________________________  Past Surgical History:   has a past surgical history that includes Rotator cuff repair (Right); Pancreas surgery; Cholecystectomy; pr laparoscopy surg cholecystectomy (N/A, 02/14/2017); Liver surgery; pr injx anes celiac plexus w/wo radiologic monitrng (Bilateral, 05/09/2017); pr injx anes celiac plexus w/wo radiologic monitrng (Bilateral, 06/01/2017); pr injx anes celiac plexus w/wo radiologic monitrng (Bilateral, 08/08/2017);  Esophagogastroduodenoscopy (N/A, 11/16/2017); pr injection anes lmbr/thrc paravertbrl sympathetic (Bilateral, 12/28/2017); Knee arthroscopy (Bilateral); Shoulder arthroscopy; Shoulder arthroscopy (Right); Esophagogastroduodenoscopy (N/A, 04/19/2018); Esophagogastroduodenoscopy (N/A, 05/10/2018); Knee arthroscopy (Right, 2009); NERVE BLOCK (Bilateral, 05/29/2018); Celiac plexus block (Bilateral, 10/29/2018); Celiac plexus block (Bilateral, 01/10/2019); IR temporary dialysis catheter placement (02/28/2019); pr injx anes celiac plexus w/wo radiologic monitrng (Bilateral, 04/18/2019); Pancreatic cyst excision; Knee arthroscopy (Right, 07/01/2019); pr injx anes celiac plexus w/wo radiologic monitrng (Bilateral, 08/20/2019); pr injx anes celiac plexus w/wo radiologic monitrng (Bilateral, 10/17/2019); Colonoscopy; Hernia repair (Bilateral, 02/03/2023); pr surgical arthroscopy shoulder w/rotator cuff rpr (Left, 06/09/2023); and Shoulder surgery.,  _______________________________________________________________________  Family History:  family history includes Cancer in his other; Cirrhosis in his mother; Heart disease in his other.,  _______________________________________________________________________  Social History:   reports that he quit smoking about 2 years ago. His smoking use included cigarettes. He started smoking about 22 years ago. He has a 20.0 pack-year smoking history. He has never used smokeless tobacco. He reports current alcohol use of about 2.0 standard drinks of alcohol per week. He reports that he does not use drugs.,  _______________________________________________________________________  Allergies:  is allergic to bee venom..  _______________________________________________________________________  Current Outpatient Medications   Medication Sig Dispense Refill   • albuterol (PROVENTIL HFA,VENTOLIN HFA) 90 mcg/act inhaler INHALE TWO PUFFS BY MOUTH EVERY 6 HOURS AS NEEDED FOR WHEEZING 54 g 0   •  amLODIPine (NORVASC) 5 mg tablet TAKE ONE TABLET BY MOUTH EVERY DAY 30 tablet 0   • Choline Fenofibrate (Fenofibric Acid) 135 MG CPDR TAKE ONE CAPSULE BY MOUTH EVERY DAY 90 capsule 0   • doxycycline hyclate (VIBRAMYCIN) 100 mg capsule Take 1 capsule (100 mg total) by mouth every 12 (twelve) hours for 10 days 20 capsule 0   • Empagliflozin 10 MG TABS Take 10 mg by mouth every morning     • esomeprazole (NexIUM) 40 MG capsule Take 1 capsule (40 mg total) by mouth 2 (two) times a day before meals 60 capsule 5   • famotidine (PEPCID) 40 MG tablet Take 40 mg by mouth daily at bedtime as needed      • insulin degludec (Tresiba FlexTouch) 100 units/mL injection pen 20 units subcut in hs     • Insulin Pen Needle (Pen Needles) 32G X 5 MM MISC use once daily as directed     • lisinopril (ZESTRIL) 20 mg tablet TAKE ONE TABLET BY MOUTH EVERY DAY 90 tablet 0   • MILK THISTLE PO Take by mouth in the morning     • Multiple Vitamins-Minerals (MULTIVITAMIN ADULTS PO) Take by mouth daily after breakfast     • omega-3-acid ethyl esters (LOVAZA) 1 g capsule TAKE TWO CAPSULES BY MOUTH TWICE DAILY 360 capsule 0   • ondansetron (ZOFRAN-ODT) 4 mg disintegrating tablet DISSOLVE ONE TABLET IN MOUTH EVERY EIGHT HOURS AS NEEDED NAUSEA AND VOMITING 30 tablet 0   • Pancrelipase, Lip-Prot-Amyl, (Creon) 39958-686732 units CPEP Take 1 capsule (36,000 Units total) by mouth 3 (three) times a day before meals 90 capsule 0   • predniSONE 20 mg tablet Take 2 tablets (40 mg total) by mouth daily for 5 days 10 tablet 0   • propranolol (INDERAL) 20 mg tablet TAKE 1 TABLET BY MOUTH TWICE DAILY 180 tablet 3   • rosuvastatin (CRESTOR) 40 MG tablet TAKE ONE TABLET BY MOUTH EVERY DAY 90 tablet 0   • traZODone (DESYREL) 50 mg tablet Take 1 tablet (50 mg total) by mouth daily at bedtime (Patient taking differently: Take 50 mg by mouth if needed) 30 tablet 0   • TURMERIC PO Take by mouth in the morning     • ibuprofen (MOTRIN) 800 mg tablet Take 1 tablet (800 mg  "total) by mouth every 8 (eight) hours as needed for mild pain Take 1 tablet by mouth 3 times daily for 3 days, then take as needed. Take with food and water. Discontinue if stomach upset occurs (Patient not taking: Reported on 7/27/2023) 30 tablet 0   • NON FORMULARY Pantessin 1 capsule daily (Patient not taking: Reported on 1/30/2024)     • pancrelipase, Lip-Prot-Amyl, (Creon) 12,000 units capsule Take 36,000 units of lipase by mouth 3 (three) times a day with meals (Patient taking differently: Take 36,000 units of lipase by mouth 3 (three) times a day with meals Snacks only) 270 capsule 2     No current facility-administered medications for this visit.     _______________________________________________________________________  Review of Systems   Constitutional:  Positive for diaphoresis. Negative for chills, fatigue and fever.   HENT:  Positive for congestion, ear pain, sinus pressure and sinus pain. Negative for sore throat.    Eyes:  Negative for pain and visual disturbance.   Respiratory:  Positive for shortness of breath. Negative for cough and chest tightness.    Cardiovascular:  Negative for chest pain and palpitations.   Gastrointestinal:  Negative for abdominal pain, constipation, diarrhea, nausea and vomiting.   Musculoskeletal:  Positive for arthralgias. Negative for back pain and myalgias.   Skin:  Negative for color change and rash.   Neurological:  Positive for dizziness and headaches.   All other systems reviewed and are negative.        Objective:  Vitals:    01/30/24 0946   BP: 124/74   BP Location: Left arm   Patient Position: Sitting   Cuff Size: Standard   Pulse: 68   Temp: (!) 96.8 °F (36 °C)   SpO2: 95%   Weight: 84.8 kg (187 lb)   Height: 5' 11\" (1.803 m)     Body mass index is 26.08 kg/m².     Physical Exam  Cardiovascular:      Pulses: no weak pulses          Dorsalis pedis pulses are 2+ on the right side and 2+ on the left side.        Posterior tibial pulses are 2+ on the right side and " 2+ on the left side.   Feet:      Right foot:      Skin integrity: No ulcer, skin breakdown, erythema, warmth, callus or dry skin.      Left foot:      Skin integrity: No ulcer, skin breakdown, erythema, warmth, callus or dry skin.         Procedures  Diabetic Foot Exam    Patient's shoes and socks removed.    Right Foot/Ankle   Right Foot Inspection  Skin Exam: skin normal. Skin not intact, no dry skin, no warmth, no callus, no erythema, no maceration, no abnormal color, no pre-ulcer, no ulcer and no callus.     Toe Exam: ROM and strength within normal limits.     Sensory   Vibration: intact  Proprioception: intact  Monofilament testing: intact    Vascular  Capillary refills: < 3 seconds  The right DP pulse is 2+. The right PT pulse is 2+.     Left Foot/Ankle  Left Foot Inspection  Skin Exam: skin normal. Skin not intact, no dry skin, no warmth, no erythema, no maceration, normal color, no pre-ulcer, no ulcer and no callus.     Toe Exam: ROM and strength within normal limits.     Sensory   Vibration: intact  Proprioception: intact  Monofilament testing: intact    Vascular  Capillary refills: < 3 seconds  The left DP pulse is 2+. The left PT pulse is 2+.     Assign Risk Category  No deformity present  No loss of protective sensation  No weak pulses  Risk: 0

## 2024-01-30 NOTE — LETTER
January 30, 2024     Patient: Ashwin Wright  YOB: 1975  Date of Visit: 1/30/2024      To Whom it May Concern:    Ashwin Wright is under my professional care. Ashwin was seen in my office on 1/30/2024. Ashwin may return to work on 2/1/24 .    If you have any questions or concerns, please don't hesitate to call.         Sincerely,          LUANA Morrissey        CC: No Recipients

## 2024-02-02 ENCOUNTER — TELEPHONE (OUTPATIENT)
Dept: FAMILY MEDICINE CLINIC | Facility: CLINIC | Age: 49
End: 2024-02-02

## 2024-02-02 NOTE — TELEPHONE ENCOUNTER
Patient called in and stated that he has been experiencing dizziness and it's hard for him to lay down or stand up. 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  Patient can do. He's been taking the steroid and the antibiotic you prescribed. Please advise. Thanks

## 2024-02-05 ENCOUNTER — OFFICE VISIT (OUTPATIENT)
Dept: FAMILY MEDICINE CLINIC | Facility: CLINIC | Age: 49
End: 2024-02-05
Payer: COMMERCIAL

## 2024-02-05 VITALS
HEIGHT: 71 IN | WEIGHT: 187 LBS | DIASTOLIC BLOOD PRESSURE: 72 MMHG | BODY MASS INDEX: 26.18 KG/M2 | SYSTOLIC BLOOD PRESSURE: 128 MMHG | TEMPERATURE: 98 F | HEART RATE: 70 BPM | OXYGEN SATURATION: 96 %

## 2024-02-05 DIAGNOSIS — H69.92 ETD (EUSTACHIAN TUBE DYSFUNCTION), LEFT: Primary | ICD-10-CM

## 2024-02-05 DIAGNOSIS — I10 PRIMARY HYPERTENSION: ICD-10-CM

## 2024-02-05 DIAGNOSIS — K85.90 ACUTE ON CHRONIC PANCREATITIS (HCC): ICD-10-CM

## 2024-02-05 DIAGNOSIS — K86.1 ACUTE ON CHRONIC PANCREATITIS (HCC): ICD-10-CM

## 2024-02-05 PROCEDURE — 99214 OFFICE O/P EST MOD 30 MIN: CPT | Performed by: FAMILY MEDICINE

## 2024-02-05 RX ORDER — FLUTICASONE PROPIONATE 50 MCG
1 SPRAY, SUSPENSION (ML) NASAL DAILY
Qty: 9 ML | Refills: 1 | Status: SHIPPED | OUTPATIENT
Start: 2024-02-05

## 2024-02-05 RX ORDER — AZITHROMYCIN 250 MG/1
TABLET, FILM COATED ORAL
Qty: 6 TABLET | Refills: 0 | Status: SHIPPED | OUTPATIENT
Start: 2024-02-05 | End: 2024-02-09

## 2024-02-05 NOTE — PROGRESS NOTES
Assessment/Plan:     Chronic Problems:  No problem-specific Assessment & Plan notes found for this encounter.      Visit Diagnosis:  Diagnoses and all orders for this visit:    ETD (Eustachian tube dysfunction), left  Comments:  Discussed plan of care, call for any changes failure to resolve  Orders:  -     fluticasone (FLONASE) 50 mcg/act nasal spray; 1 spray into each nostril daily  -     azithromycin (ZITHROMAX) 250 mg tablet; Take 2 tablets today then 1 tablet daily x 4 days    Primary hypertension  Comments:  Within current parameters continue current medication    Acute on chronic pancreatitis (HCC)  Comments:  Stable asymptomatic continue current care GI          Subjective:    Patient ID: Ashwin Wright is a 48 y.o. male.    C/o left ear aches with some dizziness   Presently completing doxy , also completed steriods   Persistent   [employer]   Htn , stable numbers , meds unchanged  Chronic pancreatitis , no flares , continues to f/u with gi , , presently with insulin regimen       Earache   Pertinent negatives include no abdominal pain, coughing, diarrhea, headaches, hearing loss, rash, rhinorrhea, sore throat or vomiting.       The following portions of the patient's history were reviewed and updated as appropriate: allergies, current medications, past family history, past medical history, past social history, past surgical history and problem list.    Review of Systems   Constitutional:  Negative for appetite change, chills, fever and unexpected weight change.   HENT:  Positive for ear pain. Negative for congestion, dental problem, hearing loss, postnasal drip, rhinorrhea, sinus pressure, sinus pain, sneezing, sore throat, tinnitus and voice change.    Eyes:  Negative for visual disturbance.   Respiratory:  Negative for apnea, cough, chest tightness and shortness of breath.    Cardiovascular:  Negative for chest pain, palpitations and leg swelling.   Gastrointestinal:  Negative for abdominal pain,  "blood in stool, constipation, diarrhea, nausea and vomiting.   Endocrine: Negative for cold intolerance, heat intolerance, polydipsia, polyphagia and polyuria.   Genitourinary:  Negative for decreased urine volume, difficulty urinating, dysuria, frequency and hematuria.   Musculoskeletal:  Negative for arthralgias, back pain, gait problem, joint swelling and myalgias.   Skin:  Negative for color change, rash and wound.   Allergic/Immunologic: Negative for environmental allergies and food allergies.   Neurological:  Negative for dizziness, syncope, weakness, light-headedness, numbness and headaches.   Hematological:  Negative for adenopathy. Does not bruise/bleed easily.   Psychiatric/Behavioral:  Negative for sleep disturbance and suicidal ideas. The patient is not nervous/anxious.          /72   Pulse 70   Temp 98 °F (36.7 °C)   Ht 5' 11\" (1.803 m)   Wt 84.8 kg (187 lb)   SpO2 96%   BMI 26.08 kg/m²   Social History     Socioeconomic History    Marital status: /Civil Union     Spouse name: Not on file    Number of children: Not on file    Years of education: Not on file    Highest education level: Not on file   Occupational History    Occupation: fulltime employment   Tobacco Use    Smoking status: Former     Current packs/day: 0.00     Average packs/day: 1 pack/day for 20.0 years (20.0 ttl pk-yrs)     Types: Cigarettes     Start date: 3/15/2001     Quit date: 3/15/2021     Years since quittin.8    Smokeless tobacco: Never   Vaping Use    Vaping status: Some Days    Substances: Nicotine   Substance and Sexual Activity    Alcohol use: Yes     Alcohol/week: 2.0 standard drinks of alcohol     Types: 2 Cans of beer per week     Comment: on weekends    Drug use: Never    Sexual activity: Yes     Partners: Female   Other Topics Concern    Not on file   Social History Narrative    Lives with family    Daily caffeine consumption     Social Determinants of Health     Financial Resource Strain: Not on " file   Food Insecurity: No Food Insecurity (2/8/2023)    Hunger Vital Sign     Worried About Running Out of Food in the Last Year: Never true     Ran Out of Food in the Last Year: Never true   Transportation Needs: No Transportation Needs (2/8/2023)    PRAPARE - Transportation     Lack of Transportation (Medical): No     Lack of Transportation (Non-Medical): No   Physical Activity: Not on file   Stress: Not on file   Social Connections: Not on file   Intimate Partner Violence: Not on file   Housing Stability: Low Risk  (2/8/2023)    Housing Stability Vital Sign     Unable to Pay for Housing in the Last Year: No     Number of Places Lived in the Last Year: 1     Unstable Housing in the Last Year: No     Past Medical History:   Diagnosis Date    Arthritis     Asthma     Chronic pain disorder     Chronic pancreatitis (HCC)     Cirrhosis (HCC)     early cirrhosis    GERD (gastroesophageal reflux disease)     History of COVID-19 01/2022    mild s/s    Hyperlipidemia     Hypertension     Liver abscess     Liver disease     Meniscus tear     left knee work injury last assessed 08/24/2016    Pancreatitis     Pneumonia     Rotator cuff tear      Family History   Problem Relation Age of Onset    Cirrhosis Mother     Heart disease Other         cardiac disorder    Cancer Other      Past Surgical History:   Procedure Laterality Date    CELIAC PLEXUS BLOCK Bilateral 10/29/2018    Procedure: SPLANCHNIC NERVE BLOCK;  Surgeon: Ramiro Chinchilla MD;  [facility];  Service: Pain Management     CELIAC PLEXUS BLOCK Bilateral 01/10/2019    Procedure: SPLANCHNIC NERVE BLOCK;  Surgeon: Ramiro Chinchilla MD;  [facility];  Service: Pain Management     CHOLECYSTECTOMY      COLONOSCOPY      ESOPHAGOGASTRODUODENOSCOPY N/A 11/16/2017    Procedure: ESOPHAGOGASTRODUODENOSCOPY (EGD);  Surgeon: Ever Bonner MD;  [facility];  Service: Gastroenterology    ESOPHAGOGASTRODUODENOSCOPY N/A 04/19/2018    Procedure:  ESOPHAGOGASTRODUODENOSCOPY (EGD);  Surgeon: Orestes Forrest MD;  [facility];  Service: Gastroenterology    ESOPHAGOGASTRODUODENOSCOPY N/A 05/10/2018    Procedure: ESOPHAGOGASTRODUODENOSCOPY (EGD);  Surgeon: Vaibhav Matthew MD;  [facility];  Service: Gastroenterology    HERNIA REPAIR Bilateral 02/03/2023    Procedure: REPAIR HERNIA INGUINAL, LAPAROSCOPIC,;  Surgeon: Juanjo Travis DO;  [facility];  Service: General    IR TEMPORARY DIALYSIS CATHETER PLACEMENT  02/28/2019    KNEE ARTHROSCOPY Bilateral     KNEE ARTHROSCOPY Right 2009    cibishino last assessed 08/24/2016    KNEE ARTHROSCOPY Right 07/01/2019    LIVER SURGERY      NERVE BLOCK Bilateral 05/29/2018    Procedure: SPLANCHNIC NERVE BLOCK;  Surgeon: Ramiro Chinchilla MD;  [facility];  Service: Pain Management     PANCREAS SURGERY      stents    PANCREATIC CYST EXCISION      RI INJECTION ANES LMBR/THRC PARAVERTBRL SYMPATHETIC Bilateral 12/28/2017    Procedure: SPLANCHNIC NERVE BLOCK;  Surgeon: Ramiro Chinchilla MD;  [facility];  Service: Pain Management     RI INJX ANES CELIAC PLEXUS W/WO RADIOLOGIC MONITRNG Bilateral 05/09/2017    Procedure: CELIAC PLEXUS BLOCK ;  Surgeon: Ramiro Chinchilla MD;  [facility];  Service: Pain Management     RI INJX ANES CELIAC PLEXUS W/WO RADIOLOGIC MONITRNG Bilateral 06/01/2017    Procedure: SPLANCHNIC NERVE BLOCK at T12;  Surgeon: Ramiro Chinchilla MD;  [facility];  Service: Pain Management     RI INJX ANES CELIAC PLEXUS W/WO RADIOLOGIC MONITRNG Bilateral 08/08/2017    Procedure: BILATERAL SPLANCHNIC NERVE BLOCK T12;  Surgeon: Ramiro Chinchilla MD;  [facility];  Service: Pain Management     RI INJX ANES CELIAC PLEXUS W/WO RADIOLOGIC MONITRNG Bilateral 04/18/2019    Procedure: BLOCK / INJECTION CELIAC PLEXUS;  Surgeon: Ramiro Chinchilla MD;  [facility];  Service: Pain Management     RI INJX ANES CELIAC PLEXUS W/WO RADIOLOGIC MONITRNG Bilateral 08/20/2019     Procedure: SPLANCHNIC NERVE BLOCK;  Surgeon: Ramiro Chinchilla MD;  [facility];  Service: Pain Management     CO INJX ANES CELIAC PLEXUS W/WO RADIOLOGIC MONITRNG Bilateral 10/17/2019    Procedure: SPLANCHNIC NERVE BLOCK;  Surgeon: Ramiro Chinchilla MD;  [facility];  Service: Pain Management     CO LAPAROSCOPY SURG CHOLECYSTECTOMY N/A 02/14/2017    Procedure: LAPAROSCOPIC CHOLECYSTECTOMY, IOC, POSSIBLE OPEN.;  Surgeon: Suresh Lang MD;  [facility];  Service: General    CO SURGICAL ARTHROSCOPY SHOULDER W/ROTATOR CUFF RPR Left 06/09/2023    Procedure: Left Shoulder Arthroscopic Rotator Cuff Repair, Open Subpectoral Biceps Tenodesis, Acromioplasty, Extensive Debridement;  Surgeon: Toro Healy DO;  [facility];  Service: Orthopedics    ROTATOR CUFF REPAIR Right     SHOULDER ARTHROSCOPY      SHOULDER ARTHROSCOPY Right     SHOULDER SURGERY         Current Outpatient Medications:     albuterol (PROVENTIL HFA,VENTOLIN HFA) 90 mcg/act inhaler, INHALE TWO PUFFS BY MOUTH EVERY 6 HOURS AS NEEDED FOR WHEEZING, Disp: 54 g, Rfl: 0    amLODIPine (NORVASC) 5 mg tablet, TAKE ONE TABLET BY MOUTH EVERY DAY, Disp: 30 tablet, Rfl: 0    azithromycin (ZITHROMAX) 250 mg tablet, Take 2 tablets today then 1 tablet daily x 4 days, Disp: 6 tablet, Rfl: 0    Choline Fenofibrate (Fenofibric Acid) 135 MG CPDR, TAKE ONE CAPSULE BY MOUTH EVERY DAY, Disp: 90 capsule, Rfl: 0    doxycycline hyclate (VIBRAMYCIN) 100 mg capsule, Take 1 capsule (100 mg total) by mouth every 12 (twelve) hours for 10 days, Disp: 20 capsule, Rfl: 0    Empagliflozin 10 MG TABS, Take 10 mg by mouth every morning, Disp: , Rfl:     esomeprazole (NexIUM) 40 MG capsule, Take 1 capsule (40 mg total) by mouth 2 (two) times a day before meals, Disp: 60 capsule, Rfl: 5    famotidine (PEPCID) 40 MG tablet, Take 40 mg by mouth daily at bedtime as needed , Disp: , Rfl:     fluticasone (FLONASE) 50 mcg/act nasal spray, 1 spray into each nostril  daily, Disp: 9 mL, Rfl: 1    insulin degludec (Tresiba FlexTouch) 100 units/mL injection pen, 20 units subcut in hs, Disp: , Rfl:     lisinopril (ZESTRIL) 20 mg tablet, TAKE ONE TABLET BY MOUTH EVERY DAY, Disp: 90 tablet, Rfl: 0    MILK THISTLE PO, Take by mouth in the morning, Disp: , Rfl:     Multiple Vitamins-Minerals (MULTIVITAMIN ADULTS PO), Take by mouth daily after breakfast, Disp: , Rfl:     omega-3-acid ethyl esters (LOVAZA) 1 g capsule, TAKE TWO CAPSULES BY MOUTH TWICE DAILY, Disp: 360 capsule, Rfl: 0    Pancrelipase, Lip-Prot-Amyl, (Creon) 44982-954325 units CPEP, Take 1 capsule (36,000 Units total) by mouth 3 (three) times a day before meals, Disp: 90 capsule, Rfl: 0    propranolol (INDERAL) 20 mg tablet, TAKE 1 TABLET BY MOUTH TWICE DAILY, Disp: 180 tablet, Rfl: 3    rosuvastatin (CRESTOR) 40 MG tablet, TAKE ONE TABLET BY MOUTH EVERY DAY, Disp: 90 tablet, Rfl: 0    traZODone (DESYREL) 50 mg tablet, Take 1 tablet (50 mg total) by mouth daily at bedtime (Patient taking differently: Take 50 mg by mouth if needed), Disp: 30 tablet, Rfl: 0    TURMERIC PO, Take by mouth in the morning, Disp: , Rfl:     ibuprofen (MOTRIN) 800 mg tablet, Take 1 tablet (800 mg total) by mouth every 8 (eight) hours as needed for mild pain Take 1 tablet by mouth 3 times daily for 3 days, then take as needed. Take with food and water. Discontinue if stomach upset occurs (Patient not taking: Reported on 7/27/2023), Disp: 30 tablet, Rfl: 0    Insulin Pen Needle (Pen Needles) 32G X 5 MM MISC, use once daily as directed, Disp: , Rfl:     NON FORMULARY, Pantessin 1 capsule daily (Patient not taking: Reported on 1/30/2024), Disp: , Rfl:     ondansetron (ZOFRAN-ODT) 4 mg disintegrating tablet, DISSOLVE ONE TABLET IN MOUTH EVERY EIGHT HOURS AS NEEDED NAUSEA AND VOMITING, Disp: 30 tablet, Rfl: 0    pancrelipase, Lip-Prot-Amyl, (Creon) 12,000 units capsule, Take 36,000 units of lipase by mouth 3 (three) times a day with meals (Patient taking  differently: Take 36,000 units of lipase by mouth 3 (three) times a day with meals Snacks only), Disp: 270 capsule, Rfl: 2    Allergies   Allergen Reactions    Bee Venom Swelling          Lab Review   Office Visit on 01/30/2024   Component Date Value    POCT SARS-CoV-2 Ag 01/30/2024 Negative     VALID CONTROL 01/30/2024 Valid     RAPID FLU A 01/30/2024 negative     RAPID FLU B 01/30/2024 negative    Orders Only on 01/12/2024   Component Date Value    Iron, Serum 01/12/2024 130     Total Iron Binding [place]* 01/12/2024 364     Iron Saturation 01/12/2024 36     Ferritin 01/12/2024 386 (H)     Magnesium, Serum 01/12/2024 1.8     Glucose, Random 01/12/2024 106 (H)     BUN 01/12/2024 9     Creatinine 01/12/2024 0.46 (L)     eGFR 01/12/2024 127     SL AMB BUN/CREATININE RA* 01/12/2024 20     Sodium 01/12/2024 139     Potassium 01/12/2024 3.9     Chloride 01/12/2024 102     CO2 01/12/2024 23     Calcium 01/12/2024 9.5     Protein, Total 01/12/2024 7.4     Albumin 01/12/2024 4.7     Globulin 01/12/2024 2.7     Albumin/Globulin Ratio 01/12/2024 1.7     TOTAL BILIRUBIN 01/12/2024 1.2     Alkaline Phosphatase 01/12/2024 154 (H)     AST 01/12/2024 206 (H)     ALT 01/12/2024 64 (H)     White Blood Cell Count 01/12/2024 4.5     Red Blood Cell Count 01/12/2024 3.93 (L)     Hemoglobin 01/12/2024 13.6     HCT 01/12/2024 39.4     MCV 01/12/2024 100.3 (H)     MCH 01/12/2024 34.6 (H)     MCHC 01/12/2024 34.5     RDW 01/12/2024 13.3     Platelet Count 01/12/2024 52 (L)     SL AMB MPV 01/12/2024 12.8 (H)     Neutrophils (Absolute) 01/12/2024 3,182     Lymphocytes (Absolute) 01/12/2024 725 (L)     Monocytes (Absolute) 01/12/2024 441     Eosinophils (Absolute) 01/12/2024 81     Basophils ABS 01/12/2024 72     Neutrophils 01/12/2024 70.7     Lymphocytes 01/12/2024 16.1     Monocytes 01/12/2024 9.8     Eosinophils 01/12/2024 1.8     Basophils PCT 01/12/2024 1.6     INR 01/12/2024 1.2 (H)     Prothrombin Time 01/12/2024 12.0 (H)     TSH W/RFX  "TO FREE T4 01/12/2024 1.42     Platelet Estimation 01/12/2024 DECREASED (A)         Imaging: No results found.    Objective:     Physical Exam  Constitutional:       General: He is not in acute distress.     Appearance: He is well-developed. He is not ill-appearing or toxic-appearing.   HENT:      Head: Normocephalic and atraumatic.      Right Ear: Tympanic membrane and ear canal normal. Tympanic membrane has normal mobility.      Left Ear: A middle ear effusion is present. Tympanic membrane is not injected. Tympanic membrane has normal mobility.      Nose: Congestion present.   Cardiovascular:      Rate and Rhythm: Normal rate and regular rhythm.      Heart sounds: Normal heart sounds.   Pulmonary:      Effort: Pulmonary effort is normal.      Breath sounds: Normal breath sounds.   Abdominal:      Palpations: Abdomen is soft.   Musculoskeletal:         General: Normal range of motion.      Cervical back: Normal range of motion and neck supple.   Skin:     General: Skin is warm and dry.   Neurological:      Mental Status: He is alert and oriented to person, place, and time.      Deep Tendon Reflexes: Reflexes are normal and symmetric.   Psychiatric:         Behavior: Behavior normal.         Thought Content: Thought content normal.         Judgment: Judgment normal.           There are no Patient Instructions on file for this visit.    LUANA Mejía    Portions of the record may have been created with voice recognition software.  Occasional wrong word or \"sound a like\" substitutions may have occurred due to the inherent limitations of voice recognition software.  Read the chart carefully and recognize, using context, where substitutions have occurred.  "

## 2024-02-08 ENCOUNTER — OFFICE VISIT (OUTPATIENT)
Dept: GASTROENTEROLOGY | Facility: CLINIC | Age: 49
End: 2024-02-08
Payer: COMMERCIAL

## 2024-02-08 VITALS
OXYGEN SATURATION: 98 % | SYSTOLIC BLOOD PRESSURE: 128 MMHG | BODY MASS INDEX: 25.96 KG/M2 | WEIGHT: 185.4 LBS | DIASTOLIC BLOOD PRESSURE: 62 MMHG | HEART RATE: 75 BPM | HEIGHT: 71 IN

## 2024-02-08 DIAGNOSIS — R79.89 ELEVATED LFTS: Primary | ICD-10-CM

## 2024-02-08 DIAGNOSIS — E78.1 HYPERTRIGLYCERIDEMIA: ICD-10-CM

## 2024-02-08 DIAGNOSIS — K29.50 OTHER CHRONIC GASTRITIS WITHOUT HEMORRHAGE: ICD-10-CM

## 2024-02-08 PROCEDURE — 99213 OFFICE O/P EST LOW 20 MIN: CPT | Performed by: PHYSICIAN ASSISTANT

## 2024-02-08 NOTE — PROGRESS NOTES
[facility] - Outpatient Follow-up Note  Ashwin Wright 48 y.o. male MRN: [id number]  Encounter: [id number]          ASSESSMENT AND PLAN:      1. Gastritis  - He has a history of esophagitis/gastritis which is generally managed with nexium 40 mg BID and pepcid 40 mg daily, recently with worsening of symptoms while on antibiotics as he was told to stop the nexium and pepcid to prevent interference  - Okay to resume pepcid today  - Resume nexium once done with abx  - EGD in September at [facility] when hospitalized for chest pain showed esophagitis/gastritis which has been present on prior endoscopies    2. Elevated LFTs  3. Hypertriglyceridemia  - He has a history of chronically elevated LFTs that wax and wane in the setting of hypertriglyceridemia and hepatic steatosis  - Elastography in May 2023 showed F3 severe fibrosis and >67% steatosis  - Recent LFTs 1/12 with , ALT 64, Alk phos 154, and Tbili 1.2  - Does drink several beers per week, have discussed recommendation for abstinence in the past  - Recheck LFTs and TG beginning of April; follows with endocrine for  management of TG as he has required plasmapheresis for hypertriglyceridemia in the past  - Will plan for repeat elastography later this year v further discuss liver biopsy if LFTs remain persistently elevated    Follow up in 4-5 months.      ______________________________________________________________________    SUBJECTIVE:  Kirill is a 49 yo M presenting for follow-up after he was seen in September at [facility] for chest pain and underwent a workup including a cardiac catheterization, stress test, and eventually an endoscopy which ended up being the cause of his symptoms.  This showed esophagitis and gastritis which has been seen on prior ostomies in the past.  As of recent he has been struggling with upper respiratory and sinus infections and has been on multiple different antibiotics and prednisone and he was told to  stop his antacid medicines while out antibiotics really did not interfere.  Of course while stopping the Nexium and Pepcid he has experienced worsening reflux symptoms with heartburn and regurgitation.  He denies any nausea or vomiting.  He is experiencing some diarrhea with being on the antibiotics but nothing bloody.  He otherwise reports that he been doing really well and have really has not dealt with chronic abdominal pain from his pancreatitis in the past year.  His last treatment for his chronic pancreatitis pain was an EUS in March 2022 where he had a celiac plexus block done.      REVIEW OF SYSTEMS IS OTHERWISE NEGATIVE.      Historical Information   Past Medical History:   Diagnosis Date    Arthritis     Asthma     Chronic pain disorder     Chronic pancreatitis (HCC)     Cirrhosis (HCC)     early cirrhosis    GERD (gastroesophageal reflux disease)     History of COVID-19 01/2022    mild s/s    Hyperlipidemia     Hypertension     Liver abscess     Liver disease     Meniscus tear     left knee work injury last assessed 08/24/2016    Pancreatitis     Pneumonia     Rotator cuff tear      Past Surgical History:   Procedure Laterality Date    CELIAC PLEXUS BLOCK Bilateral 10/29/2018    Procedure: SPLANCHNIC NERVE BLOCK;  Surgeon: Ramiro Chinchilla MD;  [facility];  Service: Pain Management     CELIAC PLEXUS BLOCK Bilateral 01/10/2019    Procedure: SPLANCHNIC NERVE BLOCK;  Surgeon: Ramiro Chinchilla MD;  [facility];  Service: Pain Management     CHOLECYSTECTOMY      COLONOSCOPY      ESOPHAGOGASTRODUODENOSCOPY N/A 11/16/2017    Procedure: ESOPHAGOGASTRODUODENOSCOPY (EGD);  Surgeon: Ever Bonner MD;  [facility];  Service: Gastroenterology    ESOPHAGOGASTRODUODENOSCOPY N/A 04/19/2018    Procedure: ESOPHAGOGASTRODUODENOSCOPY (EGD);  Surgeon: Orestes Forrest MD;  [facility];  Service: Gastroenterology    ESOPHAGOGASTRODUODENOSCOPY N/A 05/10/2018    Procedure: ESOPHAGOGASTRODUODENOSCOPY  (EGD);  Surgeon: Vaibhav Matthew MD;  [facility];  Service: Gastroenterology    HERNIA REPAIR Bilateral 02/03/2023    Procedure: REPAIR HERNIA INGUINAL, LAPAROSCOPIC,;  Surgeon: Juanjo Travis DO;  [facility];  Service: General    IR TEMPORARY DIALYSIS CATHETER PLACEMENT  02/28/2019    KNEE ARTHROSCOPY Bilateral     KNEE ARTHROSCOPY Right 2009    cibishino last assessed 08/24/2016    KNEE ARTHROSCOPY Right 07/01/2019    LIVER SURGERY      NERVE BLOCK Bilateral 05/29/2018    Procedure: SPLANCHNIC NERVE BLOCK;  Surgeon: Ramiro Chinchilla MD;  [facility];  Service: Pain Management     PANCREAS SURGERY      stents    PANCREATIC CYST EXCISION      HI INJECTION ANES LMBR/THRC PARAVERTBRL SYMPATHETIC Bilateral 12/28/2017    Procedure: SPLANCHNIC NERVE BLOCK;  Surgeon: Ramiro Chinchilla MD;  [facility];  Service: Pain Management     HI INJX ANES CELIAC PLEXUS W/WO RADIOLOGIC MONITRNG Bilateral 05/09/2017    Procedure: CELIAC PLEXUS BLOCK ;  Surgeon: Ramiro Chinchilla MD;  [facility];  Service: Pain Management     HI INJX ANES CELIAC PLEXUS W/WO RADIOLOGIC MONITRNG Bilateral 06/01/2017    Procedure: SPLANCHNIC NERVE BLOCK at T12;  Surgeon: Ramiro Chinchilla MD;  [facility];  Service: Pain Management     HI INJX ANES CELIAC PLEXUS W/WO RADIOLOGIC MONITRNG Bilateral 08/08/2017    Procedure: BILATERAL SPLANCHNIC NERVE BLOCK T12;  Surgeon: Ramiro Chinchilla MD;  [facility];  Service: Pain Management     HI INJX ANES CELIAC PLEXUS W/WO RADIOLOGIC MONITRNG Bilateral 04/18/2019    Procedure: BLOCK / INJECTION CELIAC PLEXUS;  Surgeon: Ramiro Chinchilla MD;  [facility];  Service: Pain Management     HI INJX ANES CELIAC PLEXUS W/WO RADIOLOGIC MONITRNG Bilateral 08/20/2019    Procedure: SPLANCHNIC NERVE BLOCK;  Surgeon: Ramiro Chinchilla MD;  [facility];  Service: Pain Management     HI INJX ANES CELIAC PLEXUS W/WO RADIOLOGIC MONITRNG Bilateral 10/17/2019     Procedure: SPLANCHNIC NERVE BLOCK;  Surgeon: Ramiro Chinchilla MD;  [facility];  Service: Pain Management     CO LAPAROSCOPY SURG CHOLECYSTECTOMY N/A 2017    Procedure: LAPAROSCOPIC CHOLECYSTECTOMY, IOC, POSSIBLE OPEN.;  Surgeon: Suresh Lang MD;  [facility];  Service: General    CO SURGICAL ARTHROSCOPY SHOULDER W/ROTATOR CUFF RPR Left 2023    Procedure: Left Shoulder Arthroscopic Rotator Cuff Repair, Open Subpectoral Biceps Tenodesis, Acromioplasty, Extensive Debridement;  Surgeon: Toro Healy DO;  [facility];  Service: Orthopedics    ROTATOR CUFF REPAIR Right     SHOULDER ARTHROSCOPY      SHOULDER ARTHROSCOPY Right     SHOULDER SURGERY       Social History   Social History     Substance and Sexual Activity   Alcohol Use Yes    Alcohol/week: 2.0 standard drinks of alcohol    Types: 2 Cans of beer per week    Comment: on weekends     Social History     Substance and Sexual Activity   Drug Use Never     Social History     Tobacco Use   Smoking Status Former    Current packs/day: 0.00    Average packs/day: 1 pack/day for 20.0 years (20.0 ttl pk-yrs)    Types: Cigarettes    Start date: 3/15/2001    Quit date: 3/15/2021    Years since quittin.9   Smokeless Tobacco Never     Family History   Problem Relation Age of Onset    Cirrhosis Mother     Heart disease Other         cardiac disorder    Cancer Other        Meds/Allergies       Current Outpatient Medications:     albuterol (PROVENTIL HFA,VENTOLIN HFA) 90 mcg/act inhaler    amLODIPine (NORVASC) 5 mg tablet    azithromycin (ZITHROMAX) 250 mg tablet    Choline Fenofibrate (Fenofibric Acid) 135 MG CPDR    doxycycline hyclate (VIBRAMYCIN) 100 mg capsule    Empagliflozin 10 MG TABS    esomeprazole (NexIUM) 40 MG capsule    famotidine (PEPCID) 40 MG tablet    fluticasone (FLONASE) 50 mcg/act nasal spray    insulin degludec (Tresiba FlexTouch) 100 units/mL injection pen    Insulin Pen Needle (Pen Needles) 32G X 5 MM MISC     "lisinopril (ZESTRIL) 20 mg tablet    MILK THISTLE PO    Multiple Vitamins-Minerals (MULTIVITAMIN ADULTS PO)    omega-3-acid ethyl esters (LOVAZA) 1 g capsule    ondansetron (ZOFRAN-ODT) 4 mg disintegrating tablet    Pancrelipase, Lip-Prot-Amyl, (Creon) 62924-068460 units CPEP    propranolol (INDERAL) 20 mg tablet    rosuvastatin (CRESTOR) 40 MG tablet    traZODone (DESYREL) 50 mg tablet    TURMERIC PO    NON FORMULARY    pancrelipase, Lip-Prot-Amyl, (Creon) 12,000 units capsule    Allergies   Allergen Reactions    Bee Venom Swelling           Objective     Blood pressure 128/62, pulse 75, height 5' 11\" (1.803 m), weight 84.1 kg (185 lb 6.4 oz), SpO2 98%. Body mass index is 25.86 kg/m².      PHYSICAL EXAM:      General Appearance:   Alert, cooperative, no distress   HEENT:   Normocephalic, atraumatic, anicteric.     Neck:  Supple, symmetrical, trachea midline   Lungs:   Clear to auscultation bilaterally; no rales, rhonchi or wheezing; respirations unlabored    Heart::   Regular rate and rhythm; no murmur, rub, or gallop.   Abdomen:   Soft, non-tender, non-distended; normal bowel sounds; no masses, no organomegaly    Genitalia:   Deferred    Rectal:   Deferred    Extremities:  No cyanosis, clubbing or edema    Pulses:  2+ and symmetric    Skin:  No jaundice, rashes, or lesions    Lymph nodes:  No palpable cervical lymphadenopathy        Lab Results:   No visits with results within 1 Day(s) from this visit.   Latest known visit with results is:   Office Visit on 01/30/2024   Component Date Value    POCT SARS-CoV-2 Ag 01/30/2024 Negative     VALID CONTROL 01/30/2024 Valid     RAPID FLU A 01/30/2024 negative     RAPID FLU B 01/30/2024 negative          Radiology Results:   No results found.  "

## 2024-02-09 DIAGNOSIS — I10 ESSENTIAL HYPERTENSION: ICD-10-CM

## 2024-02-09 RX ORDER — AMLODIPINE BESYLATE 5 MG/1
5 TABLET ORAL DAILY
Qty: 30 TABLET | Refills: 0 | Status: SHIPPED | OUTPATIENT
Start: 2024-02-09

## 2024-02-14 ENCOUNTER — OFFICE VISIT (OUTPATIENT)
Age: 49
End: 2024-02-14
Payer: COMMERCIAL

## 2024-02-14 ENCOUNTER — APPOINTMENT (OUTPATIENT)
Age: 49
End: 2024-02-14
Payer: COMMERCIAL

## 2024-02-14 VITALS
OXYGEN SATURATION: 98 % | BODY MASS INDEX: 25.8 KG/M2 | DIASTOLIC BLOOD PRESSURE: 82 MMHG | WEIGHT: 185 LBS | SYSTOLIC BLOOD PRESSURE: 124 MMHG | HEART RATE: 74 BPM | TEMPERATURE: 97.1 F | RESPIRATION RATE: 18 BRPM

## 2024-02-14 DIAGNOSIS — M25.562 LEFT KNEE PAIN, UNSPECIFIED CHRONICITY: ICD-10-CM

## 2024-02-14 DIAGNOSIS — M25.562 LEFT KNEE PAIN, UNSPECIFIED CHRONICITY: Primary | ICD-10-CM

## 2024-02-14 PROCEDURE — 73564 X-RAY EXAM KNEE 4 OR MORE: CPT

## 2024-02-14 PROCEDURE — 99213 OFFICE O/P EST LOW 20 MIN: CPT | Performed by: PHYSICIAN ASSISTANT

## 2024-02-14 NOTE — LETTER
February 14, 2024     Patient: Ashwin Wright   YOB: 1975   Date of Visit: 2/14/2024       To Whom It May Concern:    It is my medical opinion that Ashwin Wright may return to work on 2/16/2024 .    If you have any questions or concerns, please don't hesitate to call.         Sincerely,        Farrukh Jolly PA-C    CC: No Recipients

## 2024-02-14 NOTE — PROGRESS NOTES
[facility]        NAME: Ashwin Wright is a 48 y.o. male  : 1975    MRN: [id number]  DATE: 2024  TIME: 12:36 PM    Assessment and Plan   Left knee pain, unspecified chronicity [M25.562]  1. Left knee pain, unspecified chronicity  XR knee 4+ vw left injury        XR Provider read.  No acute findings identified. Pt ambulatory.  Does have significant swelling    Patient Instructions     Continue to monitor symptoms.  If new or worsening symptoms develop, go immediately to Er. Drink plenty of fluids.  Follow up with Family Doctor this week.      Chief Complaint     Chief Complaint   Patient presents with    Knee Pain     Pt states last night he was using a  that went over a mound and pt wasn't able to hold onto it. Pt states left knee is hurting and left foot is going numb/turning blue.          History of Present Illness       Knee Pain   Incident onset: Last night pt was snowblowing.  Went over a slight incline and slipped landing with L knee on concrete. The injury mechanism was a direct blow. The pain is severe. The pain has been Constant since onset. Associated symptoms include numbness. Pertinent negatives include no inability to bear weight (Pt ambulating but with limp and pain), loss of motion, loss of sensation or muscle weakness. The symptoms are aggravated by palpation, weight bearing and movement. He has tried nothing for the symptoms. The treatment provided no relief.       Review of Systems   Review of Systems   Constitutional: Negative.  Negative for chills, fatigue and fever.   HENT: Negative.     Eyes: Negative.    Respiratory: Negative.  Negative for chest tightness, shortness of breath and wheezing.    Cardiovascular: Negative.  Negative for chest pain and palpitations.   Gastrointestinal: Negative.  Negative for abdominal pain, diarrhea, nausea and vomiting.   Endocrine: Negative.    Genitourinary: Negative.    Musculoskeletal:  Positive for gait problem and  joint swelling. Negative for back pain, neck pain and neck stiffness.   Skin: Negative.  Negative for color change, rash and wound.   Neurological:  Positive for numbness. Negative for dizziness, weakness, light-headedness and headaches.   Hematological: Negative.    Psychiatric/Behavioral: Negative.           Current Medications       Current Outpatient Medications:     albuterol (PROVENTIL HFA,VENTOLIN HFA) 90 mcg/act inhaler, INHALE TWO PUFFS BY MOUTH EVERY 6 HOURS AS NEEDED FOR WHEEZING, Disp: 54 g, Rfl: 0    amLODIPine (NORVASC) 5 mg tablet, TAKE ONE TABLET BY MOUTH EVERY DAY, Disp: 30 tablet, Rfl: 0    Choline Fenofibrate (Fenofibric Acid) 135 MG CPDR, TAKE ONE CAPSULE BY MOUTH EVERY DAY, Disp: 90 capsule, Rfl: 0    Empagliflozin 10 MG TABS, Take 10 mg by mouth every morning, Disp: , Rfl:     esomeprazole (NexIUM) 40 MG capsule, Take 1 capsule (40 mg total) by mouth 2 (two) times a day before meals, Disp: 60 capsule, Rfl: 5    famotidine (PEPCID) 40 MG tablet, Take 40 mg by mouth daily at bedtime as needed , Disp: , Rfl:     fluticasone (FLONASE) 50 mcg/act nasal spray, 1 spray into each nostril daily, Disp: 9 mL, Rfl: 1    insulin degludec (Tresiba FlexTouch) 100 units/mL injection pen, 20 units subcut in hs, Disp: , Rfl:     Insulin Pen Needle (Pen Needles) 32G X 5 MM MISC, use once daily as directed, Disp: , Rfl:     lisinopril (ZESTRIL) 20 mg tablet, TAKE ONE TABLET BY MOUTH EVERY DAY, Disp: 90 tablet, Rfl: 0    MILK THISTLE PO, Take by mouth in the morning, Disp: , Rfl:     NON FORMULARY, Pantessin 1 capsule daily, Disp: , Rfl:     omega-3-acid ethyl esters (LOVAZA) 1 g capsule, TAKE TWO CAPSULES BY MOUTH TWICE DAILY, Disp: 360 capsule, Rfl: 0    Pancrelipase, Lip-Prot-Amyl, (Creon) 09675-647566 units CPEP, Take 1 capsule (36,000 Units total) by mouth 3 (three) times a day before meals, Disp: 90 capsule, Rfl: 0    propranolol (INDERAL) 20 mg tablet, TAKE 1 TABLET BY MOUTH TWICE DAILY, Disp: 180 tablet,  Rfl: 3    rosuvastatin (CRESTOR) 40 MG tablet, TAKE ONE TABLET BY MOUTH EVERY DAY, Disp: 90 tablet, Rfl: 0    traZODone (DESYREL) 50 mg tablet, Take 1 tablet (50 mg total) by mouth daily at bedtime (Patient taking differently: Take 50 mg by mouth if needed), Disp: 30 tablet, Rfl: 0    TURMERIC PO, Take by mouth in the morning, Disp: , Rfl:     Multiple Vitamins-Minerals (MULTIVITAMIN ADULTS PO), Take by mouth daily after breakfast (Patient not taking: Reported on 2/14/2024), Disp: , Rfl:     ondansetron (ZOFRAN-ODT) 4 mg disintegrating tablet, DISSOLVE ONE TABLET IN MOUTH EVERY EIGHT HOURS AS NEEDED NAUSEA AND VOMITING, Disp: 30 tablet, Rfl: 0    pancrelipase, Lip-Prot-Amyl, (Creon) 12,000 units capsule, Take 36,000 units of lipase by mouth 3 (three) times a day with meals (Patient taking differently: Take 36,000 units of lipase by mouth 3 (three) times a day with meals Snacks only), Disp: 270 capsule, Rfl: 2    Current Allergies     Allergies as of 02/14/2024 - Reviewed 02/14/2024   Allergen Reaction Noted    Bee venom Swelling             The following portions of the patient's history were reviewed and updated as appropriate: allergies, current medications, past family history, past medical history, past social history, past surgical history and problem list.     Past Medical History:   Diagnosis Date    Arthritis     Asthma     Chronic pain disorder     Chronic pancreatitis (HCC)     Cirrhosis (HCC)     early cirrhosis    GERD (gastroesophageal reflux disease)     History of COVID-19 01/2022    mild s/s    Hyperlipidemia     Hypertension     Liver abscess     Liver disease     Meniscus tear     left knee work injury last assessed 08/24/2016    Pancreatitis     Pneumonia     Rotator cuff tear        Past Surgical History:   Procedure Laterality Date    CELIAC PLEXUS BLOCK Bilateral 10/29/2018    Procedure: SPLANCHNIC NERVE BLOCK;  Surgeon: Ramiro Chinchilla MD;  [facility];  Service: Pain Management      CELIAC PLEXUS BLOCK Bilateral 01/10/2019    Procedure: SPLANCHNIC NERVE BLOCK;  Surgeon: Ramiro Chinchilla MD;  [facility];  Service: Pain Management     CHOLECYSTECTOMY      COLONOSCOPY      ESOPHAGOGASTRODUODENOSCOPY N/A 11/16/2017    Procedure: ESOPHAGOGASTRODUODENOSCOPY (EGD);  Surgeon: Ever Bonner MD;  [facility];  Service: Gastroenterology    ESOPHAGOGASTRODUODENOSCOPY N/A 04/19/2018    Procedure: ESOPHAGOGASTRODUODENOSCOPY (EGD);  Surgeon: Orestes Forrest MD;  [facility];  Service: Gastroenterology    ESOPHAGOGASTRODUODENOSCOPY N/A 05/10/2018    Procedure: ESOPHAGOGASTRODUODENOSCOPY (EGD);  Surgeon: Vaibhav Matthew MD;  [facility];  Service: Gastroenterology    HERNIA REPAIR Bilateral 02/03/2023    Procedure: REPAIR HERNIA INGUINAL, LAPAROSCOPIC,;  Surgeon: Juanjo Travis DO;  [facility];  Service: General    IR TEMPORARY DIALYSIS CATHETER PLACEMENT  02/28/2019    KNEE ARTHROSCOPY Bilateral     KNEE ARTHROSCOPY Right 2009    cibishino last assessed 08/24/2016    KNEE ARTHROSCOPY Right 07/01/2019    LIVER SURGERY      NERVE BLOCK Bilateral 05/29/2018    Procedure: SPLANCHNIC NERVE BLOCK;  Surgeon: Ramiro Chinchilla MD;  [facility];  Service: Pain Management     PANCREAS SURGERY      stents    PANCREATIC CYST EXCISION      RI INJECTION ANES LMBR/THRC PARAVERTBRL SYMPATHETIC Bilateral 12/28/2017    Procedure: SPLANCHNIC NERVE BLOCK;  Surgeon: Ramiro Chinchilla MD;  [facility];  Service: Pain Management     RI INJX ANES CELIAC PLEXUS W/WO RADIOLOGIC MONITRNG Bilateral 05/09/2017    Procedure: CELIAC PLEXUS BLOCK ;  Surgeon: Ramiro Chinchilla MD;  [facility];  Service: Pain Management     RI INJX ANES CELIAC PLEXUS W/WO RADIOLOGIC MONITRNG Bilateral 06/01/2017    Procedure: SPLANCHNIC NERVE BLOCK at T12;  Surgeon: Ramiro Chinchilla MD;  [facility];  Service: Pain Management     RI INJX ANES CELIAC PLEXUS W/WO RADIOLOGIC MONITRNG  Bilateral 08/08/2017    Procedure: BILATERAL SPLANCHNIC NERVE BLOCK T12;  Surgeon: Ramiro Chinchilla MD;  [facility];  Service: Pain Management     MN INJX ANES CELIAC PLEXUS W/WO RADIOLOGIC MONITRNG Bilateral 04/18/2019    Procedure: BLOCK / INJECTION CELIAC PLEXUS;  Surgeon: Ramiro Chinchilla MD;  [facility];  Service: Pain Management     MN INJX ANES CELIAC PLEXUS W/WO RADIOLOGIC MONITRNG Bilateral 08/20/2019    Procedure: SPLANCHNIC NERVE BLOCK;  Surgeon: Ramiro Chinchilla MD;  [facility];  Service: Pain Management     MN INJX ANES CELIAC PLEXUS W/WO RADIOLOGIC MONITRNG Bilateral 10/17/2019    Procedure: SPLANCHNIC NERVE BLOCK;  Surgeon: Ramiro Chinchilla MD;  [facility];  Service: Pain Management     MN LAPAROSCOPY SURG CHOLECYSTECTOMY N/A 02/14/2017    Procedure: LAPAROSCOPIC CHOLECYSTECTOMY, IOC, POSSIBLE OPEN.;  Surgeon: Suresh Lang MD;  [facility];  Service: General    MN SURGICAL ARTHROSCOPY SHOULDER W/ROTATOR CUFF RPR Left 06/09/2023    Procedure: Left Shoulder Arthroscopic Rotator Cuff Repair, Open Subpectoral Biceps Tenodesis, Acromioplasty, Extensive Debridement;  Surgeon: Toro Healy DO;  [facility];  Service: Orthopedics    ROTATOR CUFF REPAIR Right     SHOULDER ARTHROSCOPY      SHOULDER ARTHROSCOPY Right     SHOULDER SURGERY         Family History   Problem Relation Age of Onset    Cirrhosis Mother     Heart disease Other         cardiac disorder    Cancer Other          Medications have been verified.        Objective   /82   Pulse 74   Temp (!) 97.1 °F (36.2 °C)   Resp 18   Wt 83.9 kg (185 lb)   SpO2 98%   BMI 25.80 kg/m²        Physical Exam     Physical Exam  Vitals and nursing note reviewed.   Constitutional:       General: He is not in acute distress.     Appearance: Normal appearance. He is well-developed. He is not ill-appearing or diaphoretic.   HENT:      Head: Normocephalic and atraumatic.   Cardiovascular:      Rate  and Rhythm: Normal rate and regular rhythm.      Heart sounds: Normal heart sounds.   Pulmonary:      Effort: Pulmonary effort is normal. No respiratory distress.      Breath sounds: Normal breath sounds. No wheezing, rhonchi or rales.   Musculoskeletal:      Cervical back: Normal range of motion and neck supple.      Left knee: Swelling and ecchymosis present. No deformity or lacerations. Normal range of motion. Tenderness (Directly over patella and medial aspect of patella) present. Normal alignment.      Instability Tests: Anterior drawer test negative. Posterior drawer test negative. Anterior Lachman test negative. Medial Isabel test negative and lateral Isabel test negative.      Left lower leg: Normal. No swelling, deformity, tenderness or bony tenderness. No edema.      Left ankle: Normal. No swelling, deformity or lacerations. No tenderness. Normal range of motion. Anterior drawer test negative. Normal pulse.      Left Achilles Tendon: Normal. No tenderness.      Left foot: Normal range of motion. No swelling, deformity, laceration, tenderness or bony tenderness. Normal pulse (Dorsalis pedis and posterior tibial intact.  Capillary refill <2sec).      Comments: Sensory examination of L foot, sensation to touch intact but patient seems to have difficulty.  Sensation of position also intact but with difficulty. Circulation intact.   Lymphadenopathy:      Cervical: No cervical adenopathy.   Skin:     General: Skin is warm.      Capillary Refill: Capillary refill takes less than 2 seconds.      Coloration: Skin is not pale.      Findings: No rash.   Neurological:      Mental Status: He is alert.

## 2024-02-14 NOTE — PATIENT INSTRUCTIONS
Rest, Ice, and Elevate limb.  Continue to monitor symptoms.  If symptoms do not improve in one week, follow up with orthopedics.  Call [facility] [phone] to schedule and appointment.  If new or worsening symptoms occur, go immediately to the ER.    Knee Pain   WHAT YOU NEED TO KNOW:   Knee pain may start suddenly, or it may be a long-term problem. You may have pain on the side, front, or back of your knee. You may have knee stiffness and swelling. You may hear popping sounds or feel like your knee is giving way or locking up as you walk. You may feel pain when you sit, stand, walk, or climb up and down stairs. Knee pain can be caused by conditions such as obesity, inflammation, or strains or tears in ligaments or tendons.   DISCHARGE INSTRUCTIONS:   Return to the emergency department if:   Your pain is worse, even after treatment.     You cannot bend or straighten your leg completely.     The swelling around your knee does not go down even with treatment.    Your knee is painful and hot to the touch.    Contact your healthcare provider if:   You have questions or concerns about your condition or care.       Medicines:  You may need any of the following:  NSAIDs  help decrease swelling and pain or fever. This medicine is available with or without a doctor's order. NSAIDs can cause stomach bleeding or kidney problems in certain people. If you take blood thinner medicine, always ask your healthcare provider if NSAIDs are safe for you. Always read the medicine label and follow directions.    Acetaminophen  decreases pain and fever. It is available without a doctor's order. Ask how much to take and how often to take it. Follow directions. Read the labels of all other medicines you are using to see if they also contain acetaminophen, or ask your doctor or pharmacist. Acetaminophen can cause liver damage if not taken correctly.    Prescription pain medicine  may be given. Ask your healthcare provider how to  take this medicine safely. Some prescription pain medicines contain acetaminophen. Do not take other medicines that contain acetaminophen without talking to your healthcare provider. Too much acetaminophen may cause liver damage. Prescription pain medicine may cause constipation. Ask your healthcare provider how to prevent or treat constipation.     Take your medicine as directed.  Contact your healthcare provider if you think your medicine is not helping or if you have side effects. Tell your provider if you are allergic to any medicine. Keep a list of the medicines, vitamins, and herbs you take. Include the amounts, and when and why you take them. Bring the list or the pill bottles to follow-up visits. Carry your medicine list with you in case of an emergency.    What you can do to manage your symptoms:   Rest your knee so it can heal.  Limit activities that increase your pain. Do low-impact exercises, such as walking or swimming.     Apply ice to help reduce swelling and pain.  Use an ice pack, or put crushed ice in a plastic bag. Cover it with a towel before you apply it to your knee. Apply ice for 15 to 20 minutes every hour, or as directed.    Apply compression to help reduce swelling.  Use a brace or bandage only as directed.    Elevate your knee to help decrease pain and swelling.  Elevate your knee while you are sitting or lying down. Prop your leg on pillows to keep your knee above the level of your heart.    Prevent your knee from moving as directed.  Your healthcare provider may put on a cast or splint. You may need to wear a leg brace to stabilize your knee. A leg brace can be adjusted to increase your range of motion as your knee heals.       What you can do to prevent knee pain:   Maintain a healthy weight.  Extra weight increases your risk for knee pain. Ask your healthcare provider how much you should weigh. He or she can help you create a safe weight loss plan if you need to lose  weight.    Exercise or train properly.  Use the correct equipment for sports. Wear shoes that provide good support. Check your posture often as you exercise, play sports, or train for an event. This can help prevent stress and strain on your knees. Rest between sessions so you do not overwork your knees.    Follow up with your healthcare provider within 24 hours or as directed:  You may need follow-up treatments, such as steroid injections to decrease pain. Write down your questions so you remember to ask them during your visits.   © Copyright [employer] 2023 Information is for End User's use only and may not be sold, redistributed or otherwise used for commercial purposes.  The above information is an  only. It is not intended as medical advice for individual conditions or treatments. Talk to your doctor, nurse or pharmacist before following any medical regimen to see if it is safe and effective for you.

## 2024-02-20 ENCOUNTER — OFFICE VISIT (OUTPATIENT)
Dept: FAMILY MEDICINE CLINIC | Facility: CLINIC | Age: 49
End: 2024-02-20
Payer: COMMERCIAL

## 2024-02-20 VITALS
BODY MASS INDEX: 26.46 KG/M2 | OXYGEN SATURATION: 97 % | WEIGHT: 189 LBS | SYSTOLIC BLOOD PRESSURE: 130 MMHG | HEART RATE: 68 BPM | HEIGHT: 71 IN | DIASTOLIC BLOOD PRESSURE: 80 MMHG

## 2024-02-20 DIAGNOSIS — H69.92 ETD (EUSTACHIAN TUBE DYSFUNCTION), LEFT: Primary | ICD-10-CM

## 2024-02-20 DIAGNOSIS — G89.29 CHRONIC RIGHT EAR PAIN: ICD-10-CM

## 2024-02-20 DIAGNOSIS — H92.01 CHRONIC RIGHT EAR PAIN: ICD-10-CM

## 2024-02-20 PROCEDURE — 99213 OFFICE O/P EST LOW 20 MIN: CPT | Performed by: FAMILY MEDICINE

## 2024-02-20 NOTE — PROGRESS NOTES
Tobacco Cessation Counseling: Tobacco cessation counseling was provided. The patient is sincerely urged to quit consumption of tobacco. He is not ready to quit tobacco.       Assessment/Plan:     Chronic Problems:  No problem-specific Assessment & Plan notes found for this encounter.      Visit Diagnosis:  Diagnoses and all orders for this visit:    ETD (Eustachian tube dysfunction), left  -     Ambulatory Referral to Otolaryngology; Future    Chronic right ear pain  -     Ambulatory Referral to Otolaryngology; Future      Discussed findings with patient no apparent infective processes, discussed longevity of current issues discussed potential discussed potential environmental factors, for completeness would recommend eval ENT referral placed    Subjective:    Patient ID: Ashwin Wright is a 48 y.o. male.    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       The following portions of the patient's history were reviewed and updated as appropriate: allergies, current medications, past family history, past medical history, past social history, past surgical history and problem list.    Review of Systems   Constitutional:  Negative for appetite change, chills, fever and unexpected weight change.   HENT:  Positive for ear pain.  "Negative for congestion, dental problem, hearing loss, postnasal drip, rhinorrhea, sinus pressure, sinus pain, sneezing, sore throat, tinnitus and voice change.    Eyes:  Negative for visual disturbance.   Respiratory:  Negative for apnea, cough, chest tightness and shortness of breath.    Cardiovascular:  Negative for chest pain, palpitations and leg swelling.   Gastrointestinal:  Negative for abdominal pain, blood in stool, constipation, diarrhea, nausea and vomiting.   Endocrine: Negative for cold intolerance, heat intolerance, polydipsia, polyphagia and polyuria.   Genitourinary:  Negative for decreased urine volume, difficulty urinating, dysuria, frequency and hematuria.   Musculoskeletal:  Negative for arthralgias, back pain, gait problem, joint swelling and myalgias.   Skin:  Negative for color change, rash and wound.   Allergic/Immunologic: Negative for environmental allergies and food allergies.   Neurological:  Negative for dizziness, syncope, weakness, light-headedness, numbness and headaches.   Hematological:  Negative for adenopathy. Does not bruise/bleed easily.   Psychiatric/Behavioral:  Negative for sleep disturbance and suicidal ideas. The patient is not nervous/anxious.          /80   Pulse 68   Ht 5' 11\" (1.803 m)   Wt 85.7 kg (189 lb)   SpO2 97%   BMI 26.36 kg/m²   Social History     Socioeconomic History    Marital status: /Civil Union     Spouse name: Not on file    Number of children: Not on file    Years of education: Not on file    Highest education level: Not on file   Occupational History    Occupation: fulltime employment   Tobacco Use    Smoking status: Former     Current packs/day: 0.00     Average packs/day: 1 pack/day for 20.0 years (20.0 ttl pk-yrs)     Types: Cigarettes     Start date: 3/15/2001     Quit date: 3/15/2021     Years since quittin.9    Smokeless tobacco: Never   Vaping Use    Vaping status: Some Days    Substances: Nicotine   Substance and Sexual " Activity    Alcohol use: Yes     Alcohol/week: 2.0 standard drinks of alcohol     Types: 2 Cans of beer per week     Comment: on weekends    Drug use: Never    Sexual activity: Yes     Partners: Female   Other Topics Concern    Not on file   Social History Narrative    Lives with family    Daily caffeine consumption     Social Determinants of Health     Financial Resource Strain: Not on file   Food Insecurity: No Food Insecurity (2/8/2023)    Hunger Vital Sign     Worried About Running Out of Food in the Last Year: Never true     Ran Out of Food in the Last Year: Never true   Transportation Needs: No Transportation Needs (2/8/2023)    PRAPARE - Transportation     Lack of Transportation (Medical): No     Lack of Transportation (Non-Medical): No   Physical Activity: Not on file   Stress: Not on file   Social Connections: Not on file   Intimate Partner Violence: Not on file   Housing Stability: Low Risk  (2/8/2023)    Housing Stability Vital Sign     Unable to Pay for Housing in the Last Year: No     Number of Places Lived in the Last Year: 1     Unstable Housing in the Last Year: No     Past Medical History:   Diagnosis Date    Arthritis     Asthma     Chronic pain disorder     Chronic pancreatitis (HCC)     Cirrhosis (HCC)     early cirrhosis    GERD (gastroesophageal reflux disease)     History of COVID-19 01/2022    mild s/s    Hyperlipidemia     Hypertension     Liver abscess     Liver disease     Meniscus tear     left knee work injury last assessed 08/24/2016    Pancreatitis     Pneumonia     Rotator cuff tear      Family History   Problem Relation Age of Onset    Cirrhosis Mother     Heart disease Other         cardiac disorder    Cancer Other      Past Surgical History:   Procedure Laterality Date    CELIAC PLEXUS BLOCK Bilateral 10/29/2018    Procedure: SPLANCHNIC NERVE BLOCK;  Surgeon: Ramiro Chinchilla MD;  [facility];  Service: Pain Management     CELIAC PLEXUS BLOCK Bilateral 01/10/2019     Procedure: SPLANCHNIC NERVE BLOCK;  Surgeon: Ramiro Chinchilla MD;  [facility];  Service: Pain Management     CHOLECYSTECTOMY      COLONOSCOPY      ESOPHAGOGASTRODUODENOSCOPY N/A 11/16/2017    Procedure: ESOPHAGOGASTRODUODENOSCOPY (EGD);  Surgeon: Ever Bonner MD;  [facility];  Service: Gastroenterology    ESOPHAGOGASTRODUODENOSCOPY N/A 04/19/2018    Procedure: ESOPHAGOGASTRODUODENOSCOPY (EGD);  Surgeon: Orestes Forrest MD;  [facility];  Service: Gastroenterology    ESOPHAGOGASTRODUODENOSCOPY N/A 05/10/2018    Procedure: ESOPHAGOGASTRODUODENOSCOPY (EGD);  Surgeon: Vaibhav Matthew MD;  [facility];  Service: Gastroenterology    HERNIA REPAIR Bilateral 02/03/2023    Procedure: REPAIR HERNIA INGUINAL, LAPAROSCOPIC,;  Surgeon: Juanjo Travis DO;  [facility];  Service: General    IR TEMPORARY DIALYSIS CATHETER PLACEMENT  02/28/2019    KNEE ARTHROSCOPY Bilateral     KNEE ARTHROSCOPY Right 2009    cibishino last assessed 08/24/2016    KNEE ARTHROSCOPY Right 07/01/2019    LIVER SURGERY      NERVE BLOCK Bilateral 05/29/2018    Procedure: SPLANCHNIC NERVE BLOCK;  Surgeon: Ramiro Chinchilla MD;  [facility];  Service: Pain Management     PANCREAS SURGERY      stents    PANCREATIC CYST EXCISION      OK INJECTION ANES LMBR/THRC PARAVERTBRL SYMPATHETIC Bilateral 12/28/2017    Procedure: SPLANCHNIC NERVE BLOCK;  Surgeon: Ramiro Chinchilla MD;  [facility];  Service: Pain Management     OK INJX ANES CELIAC PLEXUS W/WO RADIOLOGIC MONITRNG Bilateral 05/09/2017    Procedure: CELIAC PLEXUS BLOCK ;  Surgeon: Ramiro Chinchilla MD;  [facility];  Service: Pain Management     OK INJX ANES CELIAC PLEXUS W/WO RADIOLOGIC MONITRNG Bilateral 06/01/2017    Procedure: SPLANCHNIC NERVE BLOCK at T12;  Surgeon: Ramiro Chinchilla MD;  [facility];  Service: Pain Management     OK INJX ANES CELIAC PLEXUS W/WO RADIOLOGIC MONITRNG Bilateral 08/08/2017    Procedure: BILATERAL  SPLANCHNIC NERVE BLOCK T12;  Surgeon: Ramiro Chinchilla MD;  [facility];  Service: Pain Management     PA INJX ANES CELIAC PLEXUS W/WO RADIOLOGIC MONITRNG Bilateral 04/18/2019    Procedure: BLOCK / INJECTION CELIAC PLEXUS;  Surgeon: Ramiro Chinchilla MD;  [facility];  Service: Pain Management     PA INJX ANES CELIAC PLEXUS W/WO RADIOLOGIC MONITRNG Bilateral 08/20/2019    Procedure: SPLANCHNIC NERVE BLOCK;  Surgeon: Ramiro Chinchilla MD;  [facility];  Service: Pain Management     PA INJX ANES CELIAC PLEXUS W/WO RADIOLOGIC MONITRNG Bilateral 10/17/2019    Procedure: SPLANCHNIC NERVE BLOCK;  Surgeon: Ramiro Chinchilla MD;  [facility];  Service: Pain Management     PA LAPAROSCOPY SURG CHOLECYSTECTOMY N/A 02/14/2017    Procedure: LAPAROSCOPIC CHOLECYSTECTOMY, IOC, POSSIBLE OPEN.;  Surgeon: Suresh Lang MD;  [facility];  Service: General    PA SURGICAL ARTHROSCOPY SHOULDER W/ROTATOR CUFF RPR Left 06/09/2023    Procedure: Left Shoulder Arthroscopic Rotator Cuff Repair, Open Subpectoral Biceps Tenodesis, Acromioplasty, Extensive Debridement;  Surgeon: Toro Healy DO;  [facility];  Service: Orthopedics    ROTATOR CUFF REPAIR Right     SHOULDER ARTHROSCOPY      SHOULDER ARTHROSCOPY Right     SHOULDER SURGERY         Current Outpatient Medications:     albuterol (PROVENTIL HFA,VENTOLIN HFA) 90 mcg/act inhaler, INHALE TWO PUFFS BY MOUTH EVERY 6 HOURS AS NEEDED FOR WHEEZING, Disp: 54 g, Rfl: 0    amLODIPine (NORVASC) 5 mg tablet, TAKE ONE TABLET BY MOUTH EVERY DAY, Disp: 30 tablet, Rfl: 0    Choline Fenofibrate (Fenofibric Acid) 135 MG CPDR, TAKE ONE CAPSULE BY MOUTH EVERY DAY, Disp: 90 capsule, Rfl: 0    Empagliflozin 10 MG TABS, Take 10 mg by mouth every morning, Disp: , Rfl:     esomeprazole (NexIUM) 40 MG capsule, Take 1 capsule (40 mg total) by mouth 2 (two) times a day before meals, Disp: 60 capsule, Rfl: 5    famotidine (PEPCID) 40 MG tablet, Take 40 mg by mouth  daily at bedtime as needed , Disp: , Rfl:     fluticasone (FLONASE) 50 mcg/act nasal spray, 1 spray into each nostril daily, Disp: 9 mL, Rfl: 1    insulin degludec (Tresiba FlexTouch) 100 units/mL injection pen, 20 units subcut in hs, Disp: , Rfl:     Insulin Pen Needle (Pen Needles) 32G X 5 MM MISC, use once daily as directed, Disp: , Rfl:     lisinopril (ZESTRIL) 20 mg tablet, TAKE ONE TABLET BY MOUTH EVERY DAY, Disp: 90 tablet, Rfl: 0    MILK THISTLE PO, Take by mouth in the morning, Disp: , Rfl:     Multiple Vitamins-Minerals (MULTIVITAMIN ADULTS PO), Take by mouth daily after breakfast, Disp: , Rfl:     NON FORMULARY, Pantessin 1 capsule daily, Disp: , Rfl:     omega-3-acid ethyl esters (LOVAZA) 1 g capsule, TAKE TWO CAPSULES BY MOUTH TWICE DAILY, Disp: 360 capsule, Rfl: 0    ondansetron (ZOFRAN-ODT) 4 mg disintegrating tablet, DISSOLVE ONE TABLET IN MOUTH EVERY EIGHT HOURS AS NEEDED NAUSEA AND VOMITING, Disp: 30 tablet, Rfl: 0    Pancrelipase, Lip-Prot-Amyl, (Creon) 80619-601159 units CPEP, Take 1 capsule (36,000 Units total) by mouth 3 (three) times a day before meals, Disp: 90 capsule, Rfl: 0    propranolol (INDERAL) 20 mg tablet, TAKE 1 TABLET BY MOUTH TWICE DAILY, Disp: 180 tablet, Rfl: 3    rosuvastatin (CRESTOR) 40 MG tablet, TAKE ONE TABLET BY MOUTH EVERY DAY, Disp: 90 tablet, Rfl: 0    traZODone (DESYREL) 50 mg tablet, Take 1 tablet (50 mg total) by mouth daily at bedtime (Patient taking differently: Take 50 mg by mouth if needed), Disp: 30 tablet, Rfl: 0    pancrelipase, Lip-Prot-Amyl, (Creon) 12,000 units capsule, Take 36,000 units of lipase by mouth 3 (three) times a day with meals (Patient taking differently: Take 36,000 units of lipase by mouth 3 (three) times a day with meals Snacks only), Disp: 270 capsule, Rfl: 2    TURMERIC PO, Take by mouth in the morning (Patient not taking: Reported on 2/20/2024), Disp: , Rfl:     Allergies   Allergen Reactions    Bee Venom Swelling          Lab Review  "  not applicable     Imaging: XR knee 4+ vw left injury    Result Date: 2/15/2024  Narrative: XR KNEE 4+ VW LEFT INJURY INDICATION: M25.562: Pain in left knee. Knee injury 1 day ago, unable to fully straighten leg. Pain with weightbearing. COMPARISON: None FINDINGS: No acute fracture or dislocation. No joint effusion. No significant degenerative changes. No lytic or blastic osseous lesion. Soft tissue thickening in the region of the patellar tendon.     Impression: No acute osseous abnormality. Soft tissue thickening in the region of the patellar tendon. Resident: VALDEMAR ESTRADA I, the attending radiologist, have reviewed the images and agree with the final report above. Workstation performed: [id number]       Objective:     Physical Exam  Constitutional:       General: He is not in acute distress.     Appearance: He is not ill-appearing or toxic-appearing.   HENT:      Head: Normocephalic and atraumatic.      Right Ear: Tympanic membrane and external ear normal.      Left Ear: Tympanic membrane and external ear normal.      Nose: Congestion present. No rhinorrhea.   Cardiovascular:      Pulses: Normal pulses.   Pulmonary:      Effort: Pulmonary effort is normal. No respiratory distress.   Musculoskeletal:      Cervical back: Normal range of motion.   Skin:     General: Skin is warm and dry.   Neurological:      Mental Status: He is oriented to person, place, and time.   Psychiatric:         Mood and Affect: Mood normal.         Behavior: Behavior normal.         Thought Content: Thought content normal.           There are no Patient Instructions on file for this visit.    LUANA Mejía    Portions of the record may have been created with voice recognition software.  Occasional wrong word or \"sound a like\" substitutions may have occurred due to the inherent limitations of voice recognition software.  Read the chart carefully and recognize, using context, where substitutions have occurred.  "

## 2024-02-21 PROBLEM — Z00.00 HEALTHCARE MAINTENANCE: Status: RESOLVED | Noted: 2018-11-21 | Resolved: 2024-02-21

## 2024-03-01 ENCOUNTER — PATIENT MESSAGE (OUTPATIENT)
Dept: NEUROLOGY | Facility: CLINIC | Age: 49
End: 2024-03-01

## 2024-03-01 ENCOUNTER — TELEPHONE (OUTPATIENT)
Dept: NEUROLOGY | Facility: CLINIC | Age: 49
End: 2024-03-01

## 2024-03-01 DIAGNOSIS — G25.0 TREMOR, ESSENTIAL: ICD-10-CM

## 2024-03-01 RX ORDER — PROPRANOLOL HYDROCHLORIDE 20 MG/1
TABLET ORAL
Qty: 180 TABLET | Refills: 1 | Status: SHIPPED | OUTPATIENT
Start: 2024-03-01

## 2024-03-01 NOTE — TELEPHONE ENCOUNTER
Patients wife Geraldine called in to schedule follow up appointment with Brett in [facility].    Informed her that Brett is no longer at that office.     A virtual visit was scheduled for patient with Brett (Patient is ADD ON) for 3/4/24 at 9:15 am.     Patient can follow up LOV/Telemed 2/2/23 and speak with Brett about seeing someone else in [place] as Geraldine had inquired about it.    Patient also sent [employer] message about Propranolol refills.

## 2024-03-01 NOTE — TELEPHONE ENCOUNTER
Called patient and wife Geraldine and left messages to call back with our number, as per Brett's notes, patient must be seen IN PERSON or can begin to follow up with [place] office.    Was going to offer next available appointment in [place] office on 8/7/24 w/Dr Olman Hunter and place patient on wait list for sooner appointment.    If not, patient can follow up with Brett in [place] office.

## 2024-03-01 NOTE — TELEPHONE ENCOUNTER
This patient has not been seen in person in more than 2 years, last visit was virtual.  It is difficult to assess tremor via virtual platform. I will send refills of Propanolol but patient needs an IN PERSON follow up. Please cancel virtual visit scheduled for next week. Please assist with scheduling in [place], unless patient would like to be seen in [place].

## 2024-03-01 NOTE — TELEPHONE ENCOUNTER
Geraldine patients wife returned call from message.    Geraldine asked if patients medication was refill at least one more time. Upon chart review it was confirmed it was.    Patient would phong to stay in [facility].    Patient was schedule to see Dr Olman Hunter in [place] on 8/7/24 at 1 pm.     Patient was placed on wait list.

## 2024-03-09 DIAGNOSIS — G47.00 INSOMNIA, UNSPECIFIED TYPE: ICD-10-CM

## 2024-03-09 DIAGNOSIS — E78.1 HYPERTRIGLYCERIDEMIA: ICD-10-CM

## 2024-03-09 DIAGNOSIS — E78.2 MIXED HYPERLIPIDEMIA: ICD-10-CM

## 2024-03-11 DIAGNOSIS — I10 ESSENTIAL HYPERTENSION: ICD-10-CM

## 2024-03-11 RX ORDER — ONDANSETRON 4 MG/1
TABLET, ORALLY DISINTEGRATING ORAL
Qty: 30 TABLET | Refills: 0 | Status: SHIPPED | OUTPATIENT
Start: 2024-03-11

## 2024-03-11 RX ORDER — AMLODIPINE BESYLATE 5 MG/1
5 TABLET ORAL DAILY
Qty: 30 TABLET | Refills: 0 | Status: SHIPPED | OUTPATIENT
Start: 2024-03-11

## 2024-03-11 RX ORDER — FENOFIBRIC ACID 135 MG/1
1 CAPSULE, DELAYED RELEASE ORAL DAILY
Qty: 90 CAPSULE | Refills: 0 | Status: SHIPPED | OUTPATIENT
Start: 2024-03-11

## 2024-03-26 DIAGNOSIS — K86.1 CHRONIC PANCREATITIS, UNSPECIFIED PANCREATITIS TYPE (HCC): ICD-10-CM

## 2024-03-26 DIAGNOSIS — R14.0 ABDOMINAL DISTENTION: ICD-10-CM

## 2024-03-26 DIAGNOSIS — K59.00 CONSTIPATION, ACUTE: ICD-10-CM

## 2024-03-26 RX ORDER — PANCRELIPASE 36000; 180000; 114000 [USP'U]/1; [USP'U]/1; [USP'U]/1
1 CAPSULE, DELAYED RELEASE PELLETS ORAL
Qty: 90 CAPSULE | Refills: 0 | Status: SHIPPED | OUTPATIENT
Start: 2024-03-26

## 2024-03-26 NOTE — TELEPHONE ENCOUNTER
Reason for call:   [x] Refill   [] Prior Auth  [] Other:     Office:   [] PCP/Provider -   [x] Specialty/Provider - GI    Medication: Pancrelipase, Lip-Prot-Amyl, (Creon) [id number] units CPEP     Dose/Frequency:  Take 1 capsule (36,000 Units total) by mouth 3 (three) times a day before meals     Quantity: 90    Pharmacy: [facility] [place] - [address] [place] [address] [phone]     Does the patient have enough for 3 days?   [x] Yes   [] No - Send as HP to POD

## 2024-04-04 DIAGNOSIS — E78.2 MIXED HYPERLIPIDEMIA: ICD-10-CM

## 2024-04-04 RX ORDER — OMEGA-3-ACID ETHYL ESTERS 1 G/1
CAPSULE, LIQUID FILLED ORAL
Qty: 360 CAPSULE | Refills: 0 | Status: SHIPPED | OUTPATIENT
Start: 2024-04-04

## 2024-04-08 DIAGNOSIS — I10 ESSENTIAL HYPERTENSION: ICD-10-CM

## 2024-04-08 RX ORDER — AMLODIPINE BESYLATE 5 MG/1
5 TABLET ORAL DAILY
Qty: 30 TABLET | Refills: 0 | Status: SHIPPED | OUTPATIENT
Start: 2024-04-08 | End: 2024-04-10

## 2024-04-10 DIAGNOSIS — I10 ESSENTIAL HYPERTENSION: ICD-10-CM

## 2024-04-10 DIAGNOSIS — E78.2 MIXED HYPERLIPIDEMIA: ICD-10-CM

## 2024-04-10 DIAGNOSIS — G47.00 INSOMNIA, UNSPECIFIED TYPE: ICD-10-CM

## 2024-04-10 RX ORDER — ROSUVASTATIN CALCIUM 40 MG/1
40 TABLET, COATED ORAL DAILY
Qty: 90 TABLET | Refills: 0 | Status: SHIPPED | OUTPATIENT
Start: 2024-04-10

## 2024-04-10 RX ORDER — TRAZODONE HYDROCHLORIDE 50 MG/1
50 TABLET ORAL
Qty: 30 TABLET | Refills: 0 | Status: SHIPPED | OUTPATIENT
Start: 2024-04-10

## 2024-04-10 RX ORDER — AMLODIPINE BESYLATE 5 MG/1
5 TABLET ORAL DAILY
Qty: 30 TABLET | Refills: 0 | Status: SHIPPED | OUTPATIENT
Start: 2024-04-10

## 2024-05-06 DIAGNOSIS — G47.00 INSOMNIA, UNSPECIFIED TYPE: ICD-10-CM

## 2024-05-06 RX ORDER — TRAZODONE HYDROCHLORIDE 50 MG/1
50 TABLET ORAL
Qty: 30 TABLET | Refills: 0 | Status: SHIPPED | OUTPATIENT
Start: 2024-05-06

## 2024-05-10 ENCOUNTER — TELEPHONE (OUTPATIENT)
Dept: NEUROLOGY | Facility: CLINIC | Age: 49
End: 2024-05-10

## 2024-05-13 DIAGNOSIS — G47.00 INSOMNIA, UNSPECIFIED TYPE: ICD-10-CM

## 2024-05-14 DIAGNOSIS — I10 ESSENTIAL HYPERTENSION: ICD-10-CM

## 2024-05-14 RX ORDER — ONDANSETRON 4 MG/1
TABLET, ORALLY DISINTEGRATING ORAL
Qty: 30 TABLET | Refills: 0 | Status: SHIPPED | OUTPATIENT
Start: 2024-05-14

## 2024-05-14 RX ORDER — LISINOPRIL 20 MG/1
20 TABLET ORAL DAILY
Qty: 90 TABLET | Refills: 0 | Status: SHIPPED | OUTPATIENT
Start: 2024-05-14

## 2024-05-15 NOTE — PROGRESS NOTES
"Reason for Disposition  • [1] Sore throat is the only symptom AND [2] present > 48 hours    Answer Assessment - Initial Assessment Questions  1. ONSET: \"When did the throat start hurting?\" (Hours or days ago)       Monday night    2. SEVERITY: \"How bad is the sore throat?\" (Scale 1-10; mild, moderate or severe)    - MILD (1-3):  doesn't interfere with eating or normal activities    - MODERATE (4-7): interferes with eating some solids and normal activities    - SEVERE (8-10):  excruciating pain, interferes with most normal activities    - SEVERE DYSPHAGIA: can't swallow liquids, drooling      moderate    3. STREP EXPOSURE: \"Has there been any exposure to strep within the past week?\" If Yes, ask: \"What type of contact occurred?\"       Possibly friend's child    4.  VIRAL SYMPTOMS: \"Are there any symptoms of a cold, such as a runny nose, cough, hoarse voice or red eyes?\"       Ears clogged    5. FEVER: \"Do you have a fever?\" If Yes, ask: \"What is your temperature, how was it measured, and when did it start?\"      no    6. PUS ON THE TONSILS: \"Is there pus on the tonsils in the back of your throat?\"      Has not looked    7. OTHER SYMPTOMS: \"Do you have any other symptoms?\" (e.g., difficulty breathing, headache, rash)      Clogged ears    8. PREGNANCY: \"Is there any chance you are pregnant?\" \"When was your last menstrual period?\"      no    Protocols used: Sore Throat-ADULT-    " INTERNAL MEDICINE PRE-OPERATIVE EVALUATION  [facility] -  Brayden Lenz [address] [facility]    NAME: Pearl Telles  AGE: 46 y o  SEX: male  : 1975     DATE: 2023     Internal Medicine Pre-Operative Evaluation:     Chief Complaint: Pre-operative Evaluation     Surgery:Left shoulder arthroscopic rotator cuff repair (Left: Shoulder)  Anticipated Date of Surgery: 2023  Referring Provider: Ramonita Dennison MD        History of Present Illness:     Pearl Tleles is a 46 y o  male who presents to the office today for a preoperative consultation at the request of surgeon, latricia , who plans on performingLeft shoulder arthroscopic rotator cuff repair (Left: Shoulder) Planned anesthesia is general  Patient has a bleeding risk of: no recent abnormal bleeding, no remote history of abnormal bleeding and no use of Ca-channel blockers  Patient does not have objections to receiving blood products if needed  Current anti-platelet/anti-coagulation medications that the patient is prescribed includes: Aspirin   D/c'd     Assessment of Chronic Conditions:   htn , stable   Chronic pancreatitis, stable       Assessment of Cardiac Risk:  · Denies unstable or severe angina or MI in the last 6 weeks or history of stent placement in the last year   · Denies decompensated heart failure (e g  New onset heart failure, NYHA functional class IV heart failure, or worsening existing heart failure)  · Denies significant arrhythmias such as high grade AV block, symptomatic ventricular arrhythmia, newly recognized ventricular tachycardia, supraventricular tachycardia with resting heart rate >100, or symptomatic bradycardia  · Denies severe heart valve disease including aortic stenosis or symptomatic mitral stenosis     Exercise Capacity:  · Able to walk 4 blocks without symptoms?: Yes  · Able to walk 2 flights without symptoms?: Yes    Prior Anesthesia Reactions: Yes     Personal history of venous thromboembolic disease? No    History of steroid use for >2 weeks within last year? No       Review of Systems:     Review of Systems   Constitutional: Negative for appetite change, chills, fever and unexpected weight change  HENT: Negative for congestion, dental problem, ear pain, hearing loss, postnasal drip, rhinorrhea, sinus pressure, sinus pain, sneezing, sore throat, tinnitus and voice change  Eyes: Negative for visual disturbance  Respiratory: Negative for apnea, cough, chest tightness and shortness of breath  Cardiovascular: Negative for chest pain, palpitations and leg swelling  Gastrointestinal: Negative for abdominal pain, blood in stool, constipation, diarrhea, nausea and vomiting  Endocrine: Negative for cold intolerance, heat intolerance, polydipsia, polyphagia and polyuria  Genitourinary: Negative for decreased urine volume, difficulty urinating, dysuria, frequency and hematuria  Musculoskeletal: Positive for arthralgias and myalgias  Negative for back pain, gait problem and joint swelling  Skin: Negative for color change, rash and wound  Allergic/Immunologic: Negative for environmental allergies and food allergies  Neurological: Negative for dizziness, syncope, weakness, light-headedness, numbness and headaches  Hematological: Negative for adenopathy  Does not bruise/bleed easily  Psychiatric/Behavioral: Negative for sleep disturbance and suicidal ideas  The patient is not nervous/anxious           Problem List:     Patient Active Problem List   Diagnosis   • Pancreatic lesion   • Renal mass   • Encounter for smoking cessation counseling   • Leukocytosis   • RBBB   • Epigastric pain   • Generalized abdominal pain   • Abnormal transaminases   • Acute on chronic pancreatitis (HCC)   • Leukopenia   • Hypertriglyceridemia   • Esophagitis   • Chronic pancreatitis (HCC)   • Chronic gastritis without bleeding   • Uncomplicated opioid dependence ([facility] [address] )   • GERD (gastroesophageal reflux disease)   • Hyponatremia   • Healthcare maintenance   • Elevated blood pressure reading   • Upper abdominal pain   • Hypertension   • Pancreatitis, unspecified pancreatitis type   • Abdominal pain, epigastric   • Change in bowel habits   • Musculoskeletal neck pain   • Prediabetes   • Liver abscess   • Cervical spondylosis   • Tremor of both outstretched hands   • Tremor, essential   • Right groin pain   • Non-recurrent bilateral inguinal hernia without obstruction or gangrene   • Constipation   • Severe protein-calorie malnutrition (HCC)        Allergies:      Allergies   Allergen Reactions   • Bee Venom Swelling        Current Medications:       Current Outpatient Medications:   •  albuterol (PROVENTIL HFA,VENTOLIN HFA) 90 mcg/act inhaler, INHALE TWO PUFFS BY MOUTH EVERY 6 HOURS AS NEEDED FOR WHEEZING, Disp: 54 g, Rfl: 0  •  amLODIPine (NORVASC) 5 mg tablet, TAKE ONE TABLET BY MOUTH ONCE A DAY, Disp: 30 tablet, Rfl: 1  •  Choline Fenofibrate (Fenofibric Acid) 135 MG CPDR, TAKE ONE CAPSULE BY MOUTH EVERY DAY, Disp: 90 capsule, Rfl: 0  •  Empagliflozin 10 MG TABS, Take 10 mg by mouth every morning, Disp: , Rfl:   •  famotidine (PEPCID) 40 MG tablet, Take 40 mg by mouth daily at bedtime as needed , Disp: , Rfl:   •  insulin degludec Carlos Lapidus FlexTouch) 100 units/mL injection pen, 20 units subcut in hs, Disp: , Rfl:   •  lisinopril (ZESTRIL) 20 mg tablet, Take 1 tablet (20 mg total) by mouth daily, Disp: 90 tablet, Rfl: 1  •  Multiple Vitamins-Minerals (MULTIVITAMIN ADULTS PO), Take by mouth daily after breakfast, Disp: , Rfl:   •  omega-3-acid ethyl esters (LOVAZA) 1 g capsule, Take 2 capsules (2 g total) by mouth 2 (two) times a day, Disp: 360 capsule, Rfl: 0  •  Pancrelipase, Lip-Prot-Amyl, (Creon) 49458-099025 units CPEP, Take 1 capsule (36,000 Units total) by mouth 3 (three) times a day before meals, Disp: 90 capsule, Rfl: 0  •  propranolol (INDERAL) 20 mg tablet, TAKE 1 TABLET BY MOUTH TWICE DAILY, Disp: 180 tablet, Rfl: 3  •  rosuvastatin (CRESTOR) 40 MG tablet, TAKE ONE TABLET BY MOUTH ONCE A DAY, Disp: 90 tablet, Rfl: 0  •  traZODone (DESYREL) 50 mg tablet, Take 1 tablet (50 mg total) by mouth daily at bedtime, Disp: 30 tablet, Rfl: 0  •  esomeprazole (NexIUM) 40 MG capsule, Take 1 capsule (40 mg total) by mouth 2 (two) times a day before meals (Patient not taking: Reported on 6/1/2023), Disp: 60 capsule, Rfl: 11  •  famotidine (PEPCID) 20 mg tablet, Take 1 tablet (20 mg total) by mouth 2 (two) times a day (Patient not taking: Reported on 6/5/2023), Disp: 30 tablet, Rfl: 0  •  Insulin Pen Needle (Pen Needles) 32G X 5 MM MISC, use once daily as directed, Disp: , Rfl:   •  ondansetron (ZOFRAN-ODT) 4 mg disintegrating tablet, DISSOLVE ONE TABLET IN MOUTH EVERY EIGHT HOURS AS NEEDED NAUSEA AND VOMITING, Disp: 30 tablet, Rfl: 0  •  pancrelipase, Lip-Prot-Amyl, (Creon) 12,000 units capsule, Take 36,000 units of lipase by mouth 3 (three) times a day with meals, Disp: 270 capsule, Rfl: 2     Past History:     Past Medical History:   Diagnosis Date   • Asthma    • Chronic pain disorder    • GERD (gastroesophageal reflux disease)    • Hyperlipidemia    • Hypertension    • Liver abscess    • Meniscus tear     left knee work injury last assessed 08/24/2016   • Pancreatitis    • Pneumonia         Past Surgical History:   Procedure Laterality Date   • CELIAC PLEXUS BLOCK Bilateral 10/29/2018    Procedure: SPLANCHNIC NERVE BLOCK;  Surgeon: Melonie Henderson MD;  [facility];  Service: Pain Management    • CELIAC PLEXUS BLOCK Bilateral 01/10/2019    Procedure: SPLANCHNIC NERVE BLOCK;  Surgeon: Melonie Henderson MD;  [facility];  Service: Pain Management    • CHOLECYSTECTOMY     • COLONOSCOPY     • ESOPHAGOGASTRODUODENOSCOPY N/A 11/16/2017    Procedure: ESOPHAGOGASTRODUODENOSCOPY (EGD); Surgeon: Dayana Pandey MD;  [facility];   Service: Gastroenterology   • ESOPHAGOGASTRODUODENOSCOPY N/A 04/19/2018 Procedure: ESOPHAGOGASTRODUODENOSCOPY (EGD); Surgeon: Wade Sanchez MD;  [facility]; Service: Gastroenterology   • ESOPHAGOGASTRODUODENOSCOPY N/A 05/10/2018    Procedure: ESOPHAGOGASTRODUODENOSCOPY (EGD); Surgeon: Axel Mohan MD;  [facility];   Service: Gastroenterology   • HERNIA REPAIR Bilateral 2/3/2023    Procedure: REPAIR HERNIA INGUINAL, LAPAROSCOPIC,;  Surgeon: Vinnie Edwards DO;  [facility];  Service: General   • IR TEMPORARY DIALYSIS CATHETER PLACEMENT  02/28/2019   • KNEE ARTHROSCOPY Bilateral    • KNEE ARTHROSCOPY Right 2009    cibishino last assessed 08/24/2016   • KNEE ARTHROSCOPY Right 07/01/2019   • LIVER SURGERY     • NERVE BLOCK Bilateral 05/29/2018    Procedure: SPLANCHNIC NERVE BLOCK;  Surgeon: Nasim Almeida MD;  [facility];  Service: Pain Management    • PANCREAS SURGERY      stents   • PANCREATIC CYST EXCISION     • NM INJECTION ANES LMBR/THRC PARAVERTBRL SYMPATHETIC Bilateral 12/28/2017    Procedure: SPLANCHNIC NERVE BLOCK;  Surgeon: Nasim Almeida MD;  [facility];  Service: Pain Management    • NM INJX ANES CELIAC PLEXUS W/WO RADIOLOGIC MONITRNG Bilateral 05/09/2017    Procedure: CELIAC PLEXUS BLOCK ;  Surgeon: Nasim Almeida MD;  [facility];  Service: Pain Management    • NM INJX ANES CELIAC PLEXUS W/WO RADIOLOGIC MONITRNG Bilateral 06/01/2017    Procedure: SPLANCHNIC NERVE BLOCK at T12;  Surgeon: Nasim Almeida MD;  [facility];  Service: Pain Management    • NM INJX ANES CELIAC PLEXUS W/WO RADIOLOGIC MONITRNG Bilateral 08/08/2017    Procedure: BILATERAL SPLANCHNIC NERVE BLOCK T12;  Surgeon: Nasim Almeida MD;  [facility];  Service: Pain Management    • NM INJX ANES CELIAC PLEXUS W/WO RADIOLOGIC MONITRNG Bilateral 04/18/2019    Procedure: BLOCK / INJECTION CELIAC PLEXUS;  Surgeon: Nasim Almeida MD;  [facility];  Service: Pain Management    • NM INJX ANES CELIAC PLEXUS W/WO RADIOLOGIC MONITRNG Bilateral 2019    Procedure: SPLANCHNIC NERVE BLOCK;  Surgeon: Lenin Leonardo MD;  [facility];  Service: Pain Management    • MA INJX ANES CELIAC PLEXUS W/WO RADIOLOGIC MONITRNG Bilateral 10/17/2019    Procedure: SPLANCHNIC NERVE BLOCK;  Surgeon: Lenin Leonardo MD;  [facility];  Service: Pain Management    • MA LAPAROSCOPY SURG CHOLECYSTECTOMY N/A 2017    Procedure: LAPAROSCOPIC CHOLECYSTECTOMY, IOC, POSSIBLE OPEN ;  Surgeon: Rajni Pizarro MD;  [facility];  Service: General   • ROTATOR CUFF REPAIR Right    • SHOULDER ARTHROSCOPY     • SHOULDER ARTHROSCOPY Right         Family History   Problem Relation Age of Onset   • Cirrhosis Mother    • Heart disease Other         cardiac disorder   • Cancer Other         Social History     Socioeconomic History   • Marital status: /Civil Union     Spouse name: Not on file   • Number of children: Not on file   • Years of education: Not on file   • Highest education level: Not on file   Occupational History   • Occupation: fulltime employment   Tobacco Use   • Smoking status: Former     Packs/day: 1 00     Years: 20 00     Total pack years: 20 00     Types: Cigarettes     Quit date: 3/15/2021     Years since quittin 2   • Smokeless tobacco: Never   Vaping Use   • Vaping Use: Former   • Substances: Nicotine   Substance and Sexual Activity   • Alcohol use:  Yes     Alcohol/week: 2 0 standard drinks of alcohol     Types: 2 Cans of beer per week     Comment: on weekends   • Drug use: Never   • Sexual activity: Yes     Partners: Female   Other Topics Concern   • Not on file   Social History Narrative    Lives with family    Daily caffeine consumption     Social Determinants of Health     Financial Resource Strain: Not on file   Food Insecurity: No Food Insecurity (2023)    Hunger Vital Sign    • Worried About Running Out of Food in the Last Year: Never true    • Ran Out of Food in the Last Year: Never true "  Transportation Needs: No Transportation Needs (2/8/2023)    PRAPARE - Transportation    • Lack of Transportation (Medical): No    • Lack of Transportation (Non-Medical): No   Physical Activity: Not on file   Stress: Not on file   Social Connections: Not on file   Intimate Partner Violence: Not on file   Housing Stability: Low Risk  (2/8/2023)    Housing Stability Vital Sign    • Unable to Pay for Housing in the Last Year: No    • Number of Places Lived in the Last Year: 1    • Unstable Housing in the Last Year: No        Physical Exam:      /70   Pulse 75   Ht 5' 11\" (1 803 m)   Wt 86 6 kg (191 lb)   SpO2 95%   BMI 26 64 kg/m²     Physical Exam      Data:     Pre-operative work-up    Laboratory Results: I have personally reviewed the pertinent laboratory results/reports     EKG: I have personally reviewed pertinent reports  Assessment:     No diagnosis found  Plan:     46 y o  male with planned surgery: Left shoulder arthroscopic rotator cuff repair (Left: Shoulder)  Cardiac Risk Estimation: per the Revised Cardiac Risk Index (Circ  100:1043, 1999), the patient's risk factors for cardiac complications include na, putting him in: RCI RISK CLASS I (0 risk factors, risk of major cardiac compl  appr  0 5%)  1  Further preoperative workup as follows:   - None; no further preoperative work-up is required    2  Medication Management/Recommendations:   - Patient has been instructed to avoid herbs or non-directed vitamins the week prior to surgery to ensure no drug interactions with perioperative surgical and anesthetic medications  - Patient should continue beta-blocker medication up through and including the day of surgery  - Insulin Management: hold  - Patient has been instructed to avoid aspirin containing medications or non-steroidal anti-inflammatory drugs for the week preceding surgery  3  Prophylaxis for cardiac events with perioperative beta-blockers: not indicated      4  " Patient requires further consultation with: None    Clearance  Patient is CLEARED for surgery without any additional cardiac testing       [place] [address] [id number]  Phone#  [phone]  Fax#  [phone]

## 2024-05-17 ENCOUNTER — TELEPHONE (OUTPATIENT)
Dept: NEUROLOGY | Facility: CLINIC | Age: 49
End: 2024-05-17

## 2024-05-17 NOTE — TELEPHONE ENCOUNTER
Called and spoke w/ pt regarding r/s appt due to Dr. Hunter's jail. Informed pt we have to r/s August appt. His appt note stated he only wants to follow up in [place] office.    Offered pt to see Dr. Luis Calixto on 10/31/24 @ 11AM and he accepted.

## 2024-05-21 NOTE — MISCELLANEOUS
I would like pt to have updated stress especially if he's going to truly think about surgery. Cont same meds   Message   Recorded as Task   Date: 08/14/2017 09:50 AM, Created By: Madalyn Regalado   Task Name: Miscellaneous   Assigned To: [facility] es clinical,Team   Regarding Patient: Virgie You, Status: In Progress   Comment:    Ellen Daniel - 14 Aug 2017 9:50 AM     TASK CREATED  pt had a branch block done last tuesday and he is in more pain then before and says he hasnt been urinating as much  please call pt back [phone]   Mariluz Granado - 14 Aug 2017 11:19 AM     TASK EDITED  S/w patient  States abdominal pain 7/10  Had B SPLANCHNIC BLOCK 8/8  Advised patient that results are temporary  Patient taking tylenol and aleve for pain and states no improvement  States voiding small amounts at a time, since Friday  Denies fever or pain/burning with voiding  Has f/u OV 8/16  Please advise  Maycol Huang - 22 Aug 2017 7:31 PM     TASK REPLIED TO: Previously Assigned To Maycol Huang  I spoke with patient and he states that he has been having more pain than usual  He states that he is currently in the hospital because he has been having more pain  He states that he has also noticed reduced UO 2 days after the procedure  He states that blood work was taken and the values were normal  He just had a CT scan of the abdomen and results are pending  He states that he urinated 3x yesterday and over the weekend (3x/day) and that today he urinated once  He denies fevers or chills  I informed patient that it is unclear as to what is causing his symptoms and that I will follow up with the results of the CT scan  He verbalizes understanding  Labs were reviewed  Lucrecia Meadows - 15 Aug 2017 10:57 AM     TASK IN PROGRESS        Active Problems    1  Abnormal LFTs (790 6) (R79 89)   2  Asthma due to seasonal allergies (493 90) (J45 909)   3  Chronic pain (338 29) (G89 29)   4  Chronic pain syndrome (338 4) (G89 4)   5  Chronic pancreatitis, unspecified pancreatitis type (577 1) (K86 1)   6  Gallbladder sludge (575 8) (K82 8)   7  High triglycerides (272 1) (E78 1)   8  History of allergy (V15 09) (Z88 9)   9  Hyperlipemia, mixed (272 2) (E78 2)   10  Insomnia, persistent (307 42) (G47 00)   11  Lesion of liver (573 8) (K76 9)   12  Liver abscess (572 0) (K75 0)   13  Muscular aches (729 1) (M79 1)   14  Nausea and vomiting (787 01) (R11 2)   15  Pancreatic pseudocyst/cyst (577 2) (K86 2,K86 3)   16  Postoperative state (V45 89) (Z98 890)   17  Renal mass (593 9) (N28 89)    Current Meds   1  Aspirin 325 MG Oral Tablet; TAKE 1 TABLET DAILY; Therapy: (Recorded:28Ceu6863) to Recorded   2  Creon 26351 UNIT Oral Capsule Delayed Release Particles; Take 1 tablet with each   snack between meals; Therapy: 18Dxz5152 to (Last Rx:13Apr2017)  Requested for: 13Apr2017; Status: ACTIVE   - Transmit to Pharmacy - Awaiting Verification Ordered   3  Creon 44877 UNIT Oral Capsule Delayed Release Particles; TAKE 1 CAPSULE 3 times   daily; Therapy: 53Sbu7217 to (Evaluate:37Asf6781)  Requested for: 24Jan2017; Last   Rx:24Jan2017; Status: ACTIVE - Transmit to Pharmacy - Awaiting Verification Ordered   4  EpiPen 2-Larry 0 3 MG/0 3ML Injection Solution Auto-injector; INJECT 0 3ML   INTRAMUSCULARLY AS DIRECTED  Requested for: 36Dkk0772; Last Rx:52Lrj9182;   Status: ACTIVE - Transmit to Pharmacy - Awaiting Verification Ordered   5  Fenofibric Acid 135 MG Oral Capsule Delayed Release (Trilipix); TAKE ONE CAPSULE   BY MOUTH EVERY DAY; Therapy: 58OHX5628 to (Evaluate:83Mfp1630)  Requested for: 23PHI0946; Last   Rx:09Mar2017; Status: ACTIVE - Transmit to [facility] Verification Ordered   6  Flaxseed (Linseed) 1000 MG CAPS; TAKE 3 TABLET BY MOUTH DAILY; Therapy: (Mireille Farias) to Recorded   7  Gralise 600 MG Oral Tablet; take 3 tablets at bedtime; Therapy: 90GTE7829 to (Evaluate:79Edc8146)  Requested for: 66IKM3650; Last   Rx:24Ibe2256; Status: ACTIVE - [facility] Ordered   8   Niacin ER (Antihyperlipidemic) 500 MG Oral Tablet Extended Release; TAKE ONE   TABLET BY MOUTH NIGHTLY AT BEDTIME; Therapy: 14IKP0708 to (Evaluate:37Tow5574)  Requested for: 92VCD9380; Last   Rx:13Pvg5333 Ordered   9  Omega-3-acid Ethyl Esters 1 GM Oral Capsule (Lovaza); TAKE 2 CAPSULES BY MOUTH   TWICE DAILY; Therapy: 16Upf5833 to (21 )  Requested for: 57Wao5325; Last   Rx:75Tir1802 Ordered   10  Ondansetron HCl - 4 MG Oral Tablet; TAKE 1 TABLET Every 8 hours PRN nausea and    vomiting; Therapy: 66Vnp5480 to (Evaluate:35Sbj2128)  Requested for: 71Daq2364; Last    Rx:63Wln6975; Status: ACTIVE - Transmit to [facility] Verification Ordered   11  Protonix 40 MG Oral Packet; take 1 tablet by mouth an 1/2 hour before breakfast daily; Therapy: (Nickolas Reyes) to Recorded   12  Rosuvastatin Calcium 20 MG Oral Tablet (Crestor); TAKE 1 TABLET DAILY  Requested    for: 91Sah7001; Last Rx:24Xac7550; Status: ACTIVE - Transmit to Pharmacy - Awaiting    Verification Ordered   13  TraZODone HCl - 50 MG Oral Tablet; TAKE ONE TABLET BY MOUTH NIGHTLY AT    BEDTIME AS NEEDED FOR SLEEP; Therapy: 56HTD3933 to (Evaluate:48Tca5734)  Requested for: 69SUK1922; Last    Rx:09Mar2017 Ordered   14  Ventolin  (90 Base) MCG/ACT Inhalation Aerosol Solution; INHALE 1 TO 2    PUFFS EVERY 4 TO 6 HOURS AS NEEDED; Therapy: 33GRZ9940 to (Last Rx:77Rkk7629)  Requested for: 76Vot5801 Ordered    Allergies    1  No Known Drug Allergies    2  Bee sting    Signatures   Electronically signed by :  Nilson Zacarias, ; Aug 15 2017 10:57AM EST                       (Author)

## 2024-06-03 DIAGNOSIS — G47.00 INSOMNIA, UNSPECIFIED TYPE: ICD-10-CM

## 2024-06-03 RX ORDER — TRAZODONE HYDROCHLORIDE 50 MG/1
50 TABLET ORAL
Qty: 30 TABLET | Refills: 5 | Status: SHIPPED | OUTPATIENT
Start: 2024-06-03

## 2024-06-13 DIAGNOSIS — G47.00 INSOMNIA, UNSPECIFIED TYPE: ICD-10-CM

## 2024-06-13 RX ORDER — ONDANSETRON 4 MG/1
TABLET, ORALLY DISINTEGRATING ORAL
Qty: 30 TABLET | Refills: 0 | Status: SHIPPED | OUTPATIENT
Start: 2024-06-13

## 2024-06-18 DIAGNOSIS — K86.1 CHRONIC PANCREATITIS, UNSPECIFIED PANCREATITIS TYPE (HCC): ICD-10-CM

## 2024-06-18 DIAGNOSIS — R14.0 ABDOMINAL DISTENTION: ICD-10-CM

## 2024-06-18 DIAGNOSIS — K59.00 CONSTIPATION, ACUTE: ICD-10-CM

## 2024-06-18 RX ORDER — PANCRELIPASE 60000; 12000; 38000 [USP'U]/1; [USP'U]/1; [USP'U]/1
36000 CAPSULE, DELAYED RELEASE PELLETS ORAL
Qty: 270 CAPSULE | Refills: 1 | Status: SHIPPED | OUTPATIENT
Start: 2024-06-18 | End: 2024-06-20 | Stop reason: SDUPTHER

## 2024-06-18 RX ORDER — PANCRELIPASE 36000; 180000; 114000 [USP'U]/1; [USP'U]/1; [USP'U]/1
1 CAPSULE, DELAYED RELEASE PELLETS ORAL
Qty: 270 CAPSULE | Refills: 0 | Status: SHIPPED | OUTPATIENT
Start: 2024-06-18

## 2024-06-18 NOTE — TELEPHONE ENCOUNTER
Reason for call:   [x] Refill   [] Prior Auth  [] Other:     Office:   [] PCP/Provider -   [x] Specialty/Provider - Gastro        Does the patient have enough for 3 days?   [] Yes   [x] No - Send as HP to POD

## 2024-06-20 ENCOUNTER — TELEPHONE (OUTPATIENT)
Age: 49
End: 2024-06-20

## 2024-06-20 ENCOUNTER — OFFICE VISIT (OUTPATIENT)
Dept: GASTROENTEROLOGY | Facility: CLINIC | Age: 49
End: 2024-06-20
Payer: COMMERCIAL

## 2024-06-20 VITALS
TEMPERATURE: 98 F | HEART RATE: 68 BPM | WEIGHT: 190.8 LBS | BODY MASS INDEX: 26.71 KG/M2 | SYSTOLIC BLOOD PRESSURE: 122 MMHG | DIASTOLIC BLOOD PRESSURE: 82 MMHG | OXYGEN SATURATION: 98 % | RESPIRATION RATE: 18 BRPM | HEIGHT: 71 IN

## 2024-06-20 DIAGNOSIS — K76.0 HEPATIC STEATOSIS: ICD-10-CM

## 2024-06-20 DIAGNOSIS — K29.30 CHRONIC SUPERFICIAL GASTRITIS WITHOUT BLEEDING: ICD-10-CM

## 2024-06-20 DIAGNOSIS — K86.1 CHRONIC PANCREATITIS, UNSPECIFIED PANCREATITIS TYPE (HCC): ICD-10-CM

## 2024-06-20 DIAGNOSIS — R79.89 ELEVATED LFTS: Primary | ICD-10-CM

## 2024-06-20 LAB
ALBUMIN SERPL-MCNC: 4.7 G/DL (ref 3.6–5.1)
ALBUMIN/GLOB SERPL: 1.7 (CALC) (ref 1–2.5)
ALP SERPL-CCNC: 123 U/L (ref 35–144)
ALT SERPL-CCNC: 45 U/L (ref 9–46)
AST SERPL-CCNC: 97 U/L (ref 10–35)
BILIRUB SERPL-MCNC: 0.8 MG/DL (ref 0.2–1.2)
BUN SERPL-MCNC: 13 MG/DL (ref 7–25)
BUN/CREAT SERPL: 20 (CALC) (ref 6–22)
CALCIUM SERPL-MCNC: 10.4 MG/DL (ref 8.6–10.3)
CHLORIDE SERPL-SCNC: 102 MMOL/L (ref 98–110)
CHOLEST SERPL-MCNC: 176 MG/DL
CHOLEST/HDLC SERPL: 4.6 (CALC)
CO2 SERPL-SCNC: 25 MMOL/L (ref 20–32)
CREAT SERPL-MCNC: 0.64 MG/DL (ref 0.7–1.3)
GFR/BSA.PRED SERPLBLD CYS-BASED-ARV: 115 ML/MIN/1.73M2
GLOBULIN SER CALC-MCNC: 2.7 G/DL (CALC) (ref 1.9–3.7)
GLUCOSE SERPL-MCNC: 101 MG/DL (ref 65–99)
HDLC SERPL-MCNC: 38 MG/DL
LDLC SERPL CALC-MCNC: 104 MG/DL (CALC)
NONHDLC SERPL-MCNC: 138 MG/DL (CALC)
POTASSIUM SERPL-SCNC: 4.1 MMOL/L (ref 3.5–5.3)
PROT SERPL-MCNC: 7.4 G/DL (ref 6.1–8.1)
SODIUM SERPL-SCNC: 139 MMOL/L (ref 135–146)
TRIGL SERPL-MCNC: 215 MG/DL

## 2024-06-20 PROCEDURE — 99214 OFFICE O/P EST MOD 30 MIN: CPT | Performed by: PHYSICIAN ASSISTANT

## 2024-06-20 RX ORDER — PANCRELIPASE 60000; 12000; 38000 [USP'U]/1; [USP'U]/1; [USP'U]/1
12000 CAPSULE, DELAYED RELEASE PELLETS ORAL 3 TIMES DAILY PRN
Qty: 270 CAPSULE | Refills: 1 | Status: SHIPPED | OUTPATIENT
Start: 2024-06-20 | End: 2024-06-25 | Stop reason: SDUPTHER

## 2024-06-20 RX ORDER — PANCRELIPASE 60000; 12000; 38000 [USP'U]/1; [USP'U]/1; [USP'U]/1
12000 CAPSULE, DELAYED RELEASE PELLETS ORAL AS NEEDED
Qty: 270 CAPSULE | Refills: 1 | Status: SHIPPED | OUTPATIENT
Start: 2024-06-20 | End: 2024-06-20 | Stop reason: SDUPTHER

## 2024-06-20 NOTE — PROGRESS NOTES
[facility] - Outpatient Follow-up Note  Ashwin Wright 49 y.o. male MRN: [id number]  Encounter: [id number]          ASSESSMENT AND PLAN:      1. Elevated LFTs  2. Hepatic steatosis  - He has a history of chronically elevated LFTs with massive hepatomegaly and steatosis on imaging;  - LFTs overall much improved over the past 4 months and he reports this may be related to portion control and overall better diet as his wife had a gastric bypass recently  - Continue regimen for hypertriglyceridemia per endocrine  - Elastography in May 2023 showed F3 severe fibrosis and >67% steatosis  - Plan for repeat elastography and US  - Does drink several beers per week, have discussed in the past the recommendation for abstinence from alcohol      3. Chronic pancreatitis, unspecified pancreatitis type (HCC)  - 2/2 severe pancreatitis in the past in the setting of familial hypertriglyceridemia  -  on recent labs  - No significant pain so no indication to repeat a celiac plexus block for pain control  - Creon 36K U TID AC and 12K U with snacks    6 month follow up.    ______________________________________________________________________    SUBJECTIVE:  Kirill is a 50 yo F presenting for routine follow up of hepatic steatosis, elevated LFTs, and chronic pancreatitis.  He overall reports no significant changes since he was last seen in February and no new problems except for when he bends over he has a lot of pain in the right lower abdomen.  It seems when he is upright that this does not happen.  He has no radiation of the pain to the groin or his testicle.  He otherwise denies any significant upper abdominal pain similar to pancreatitis in the past and no chronic pain similar to his chronic pancreatitis in the past which he had received celiac plexus blocks for.  Denies any nausea or vomiting.  He reports his reflux symptoms are controlled on Nexium 40 mg daily and Pepcid 40 mg at bedtime.  His  recent blood work shows his liver numbers have improved and his triglycerides are stable at 206 which is great for him.  He continues on his regimen of Tresiba, fenofibrate, and Crestor for management of his hypertriglyceridemia.       REVIEW OF SYSTEMS IS OTHERWISE NEGATIVE.      Historical Information   Past Medical History:   Diagnosis Date    Arthritis     Asthma     Chronic pain disorder     Chronic pancreatitis (HCC)     Cirrhosis (HCC)     early cirrhosis    GERD (gastroesophageal reflux disease)     History of COVID-19 01/2022    mild s/s    Hyperlipidemia     Hypertension     Liver abscess     Liver disease     Meniscus tear     left knee work injury last assessed 08/24/2016    Pancreatitis     Pneumonia     Rotator cuff tear      Past Surgical History:   Procedure Laterality Date    CELIAC PLEXUS BLOCK Bilateral 10/29/2018    Procedure: SPLANCHNIC NERVE BLOCK;  Surgeon: Ramiro Chinchilla MD;  [facility];  Service: Pain Management     CELIAC PLEXUS BLOCK Bilateral 01/10/2019    Procedure: SPLANCHNIC NERVE BLOCK;  Surgeon: Ramiro Chinchilla MD;  [facility];  Service: Pain Management     CHOLECYSTECTOMY      COLONOSCOPY      ESOPHAGOGASTRODUODENOSCOPY N/A 11/16/2017    Procedure: ESOPHAGOGASTRODUODENOSCOPY (EGD);  Surgeon: Ever Bonner MD;  [facility];  Service: Gastroenterology    ESOPHAGOGASTRODUODENOSCOPY N/A 04/19/2018    Procedure: ESOPHAGOGASTRODUODENOSCOPY (EGD);  Surgeon: Orestes Forrest MD;  [facility];  Service: Gastroenterology    ESOPHAGOGASTRODUODENOSCOPY N/A 05/10/2018    Procedure: ESOPHAGOGASTRODUODENOSCOPY (EGD);  Surgeon: Vaibhav Matthew MD;  [facility];  Service: Gastroenterology    HERNIA REPAIR Bilateral 02/03/2023    Procedure: REPAIR HERNIA INGUINAL, LAPAROSCOPIC,;  Surgeon: Juanjo Travis DO;  [facility];  Service: General    IR TEMPORARY DIALYSIS CATHETER PLACEMENT  02/28/2019    KNEE ARTHROSCOPY Bilateral     KNEE ARTHROSCOPY  Right 2009    cibishino last assessed 08/24/2016    KNEE ARTHROSCOPY Right 07/01/2019    LIVER SURGERY      NERVE BLOCK Bilateral 05/29/2018    Procedure: SPLANCHNIC NERVE BLOCK;  Surgeon: Ramiro Chinchilla MD;  [facility];  Service: Pain Management     PANCREAS SURGERY      stents    PANCREATIC CYST EXCISION      ME INJECTION ANES LMBR/THRC PARAVERTBRL SYMPATHETIC Bilateral 12/28/2017    Procedure: SPLANCHNIC NERVE BLOCK;  Surgeon: Ramiro Chinchilla MD;  [facility];  Service: Pain Management     ME INJX ANES CELIAC PLEXUS W/WO RADIOLOGIC MONITRNG Bilateral 05/09/2017    Procedure: CELIAC PLEXUS BLOCK ;  Surgeon: Ramiro Chinchilla MD;  [facility];  Service: Pain Management     ME INJX ANES CELIAC PLEXUS W/WO RADIOLOGIC MONITRNG Bilateral 06/01/2017    Procedure: SPLANCHNIC NERVE BLOCK at T12;  Surgeon: Ramiro Chinchilla MD;  [facility];  Service: Pain Management     ME INJX ANES CELIAC PLEXUS W/WO RADIOLOGIC MONITRNG Bilateral 08/08/2017    Procedure: BILATERAL SPLANCHNIC NERVE BLOCK T12;  Surgeon: Ramiro Chinchilla MD;  [facility];  Service: Pain Management     ME INJX ANES CELIAC PLEXUS W/WO RADIOLOGIC MONITRNG Bilateral 04/18/2019    Procedure: BLOCK / INJECTION CELIAC PLEXUS;  Surgeon: Ramiro Chinchilla MD;  [facility];  Service: Pain Management     ME INJX ANES CELIAC PLEXUS W/WO RADIOLOGIC MONITRNG Bilateral 08/20/2019    Procedure: SPLANCHNIC NERVE BLOCK;  Surgeon: Ramiro Chinchilla MD;  [facility];  Service: Pain Management     ME INJX ANES CELIAC PLEXUS W/WO RADIOLOGIC MONITRNG Bilateral 10/17/2019    Procedure: SPLANCHNIC NERVE BLOCK;  Surgeon: Ramiro Chinchilla MD;  [facility];  Service: Pain Management     ME LAPAROSCOPY SURG CHOLECYSTECTOMY N/A 02/14/2017    Procedure: LAPAROSCOPIC CHOLECYSTECTOMY, IOC, POSSIBLE OPEN.;  Surgeon: Suresh Lang MD;  [facility];  Service: General    ME SURGICAL ARTHROSCOPY SHOULDER W/ROTATOR CUFF RPR Left  06/09/2023    Procedure: Left Shoulder Arthroscopic Rotator Cuff Repair, Open Subpectoral Biceps Tenodesis, Acromioplasty, Extensive Debridement;  Surgeon: Toro Healy DO;  [facility];  Service: Orthopedics    ROTATOR CUFF REPAIR Right     SHOULDER ARTHROSCOPY      SHOULDER ARTHROSCOPY Right     SHOULDER SURGERY       Social History   Social History     Substance and Sexual Activity   Alcohol Use Yes    Alcohol/week: 2.0 standard drinks of alcohol    Types: 2 Cans of beer per week    Comment: on weekends     Social History     Substance and Sexual Activity   Drug Use Never     Social History     Tobacco Use   Smoking Status Former    Current packs/day: 0.00    Average packs/day: 1 pack/day for 20.0 years (20.0 ttl pk-yrs)    Types: Cigarettes    Start date: 3/15/2001    Quit date: 3/15/2021    Years since quitting: 3.2   Smokeless Tobacco Never     Family History   Problem Relation Age of Onset    Cirrhosis Mother     Heart disease Other         cardiac disorder    Cancer Other        Meds/Allergies       Current Outpatient Medications:     albuterol (PROVENTIL HFA,VENTOLIN HFA) 90 mcg/act inhaler    amLODIPine (NORVASC) 5 mg tablet    Choline Fenofibrate (Fenofibric Acid) 135 MG CPDR    Empagliflozin 10 MG TABS    esomeprazole (NexIUM) 40 MG capsule    famotidine (PEPCID) 40 MG tablet    fluticasone (FLONASE) 50 mcg/act nasal spray    insulin degludec (Tresiba FlexTouch) 100 units/mL injection pen    Insulin Pen Needle (Pen Needles) 32G X 5 MM MISC    lisinopril (ZESTRIL) 20 mg tablet    MILK THISTLE PO    Multiple Vitamins-Minerals (MULTIVITAMIN ADULTS PO)    NON FORMULARY    omega-3-acid ethyl esters (LOVAZA) 1 g capsule    ondansetron (ZOFRAN-ODT) 4 mg disintegrating tablet    Pancrelipase, Lip-Prot-Amyl, (Creon) 94176-690790 units CPEP    propranolol (INDERAL) 20 mg tablet    rosuvastatin (CRESTOR) 40 MG tablet    traZODone (DESYREL) 50 mg tablet    pancrelipase, Lip-Prot-Amyl, (Creon) 12,000  "units capsule    TURMERIC PO    Allergies   Allergen Reactions    Bee Venom Swelling           Objective     Blood pressure 122/82, pulse 68, temperature 98 °F (36.7 °C), temperature source Tympanic, resp. rate 18, height 5' 11\" (1.803 m), weight 86.5 kg (190 lb 12.8 oz), SpO2 98%. Body mass index is 26.61 kg/m².      PHYSICAL EXAM:      General Appearance:   Alert, cooperative, no distress   HEENT:   Normocephalic, atraumatic, anicteric.     Neck:  Supple, symmetrical, trachea midline   Lungs:   Clear to auscultation bilaterally; no rales, rhonchi or wheezing; respirations unlabored    Heart::   Regular rate and rhythm; no murmur, rub, or gallop.   Abdomen:   Soft, non-tender, non-distended; normal bowel sounds; no masses, no organomegaly    Genitalia:   Deferred    Rectal:   Deferred    Extremities:  No cyanosis, clubbing or edema    Pulses:  2+ and symmetric    Skin:  No jaundice, rashes, or lesions    Lymph nodes:  No palpable cervical lymphadenopathy        Lab Results:   No visits with results within 1 Day(s) from this visit.   Latest known visit with results is:   Orders Only on 06/19/2024   Component Date Value    Total Cholesterol 06/19/2024 176     HDL 06/19/2024 38 (L)     Triglycerides 06/19/2024 215 (H)     LDL Calculated 06/19/2024 104 (H)     Chol HDLC Ratio 06/19/2024 4.6     Non-HDL Cholesterol 06/19/2024 138 (H)     Glucose, Random 06/19/2024 101 (H)     BUN 06/19/2024 13     Creatinine 06/19/2024 0.64 (L)     eGFR 06/19/2024 115     SL AMB BUN/CREATININE RA* 06/19/2024 20     Sodium 06/19/2024 139     Potassium 06/19/2024 4.1     Chloride 06/19/2024 102     CO2 06/19/2024 25     Calcium 06/19/2024 10.4 (H)     Protein, Total 06/19/2024 7.4     Albumin 06/19/2024 4.7     Globulin 06/19/2024 2.7     Albumin/Globulin Ratio 06/19/2024 1.7     TOTAL BILIRUBIN 06/19/2024 0.8     Alkaline Phosphatase 06/19/2024 123     AST 06/19/2024 97 (H)     ALT 06/19/2024 45          Radiology Results:   No results " found.

## 2024-06-20 NOTE — TELEPHONE ENCOUNTER
Pharmacy called the RX Refill Line. Message is being forwarded to the office.     Pharmacy is requesting and updated script on pancrelipase, Lip-Prot-Amyl, (Creon) 12,000 units capsule, currently the script states to take TID with meals and the patient told the pharmacy he takes this TID with snacks. Please review, adjust and advise.    Please contact pharmacy at

## 2024-06-20 NOTE — TELEPHONE ENCOUNTER
I reviewed chart  Patient currently in hospital for abdominal pain  Quality 130: Documentation Of Current Medications In The Medical Record: Current Medications Documented Quality 226: Preventive Care And Screening: Tobacco Use: Screening And Cessation Intervention: Patient screened for tobacco use and is an ex/non-smoker Quality 431: Preventive Care And Screening: Unhealthy Alcohol Use - Screening: Patient not identified as an unhealthy alcohol user when screened for unhealthy alcohol use using a systematic screening method Detail Level: Detailed

## 2024-06-20 NOTE — TELEPHONE ENCOUNTER
Called pt and LMOM with new script sent and to call us back if any questions.  Office # provided.

## 2024-06-22 ENCOUNTER — OCCMED (OUTPATIENT)
Age: 49
End: 2024-06-22
Payer: OTHER MISCELLANEOUS

## 2024-06-22 ENCOUNTER — APPOINTMENT (OUTPATIENT)
Age: 49
End: 2024-06-22
Payer: COMMERCIAL

## 2024-06-22 DIAGNOSIS — S46.212A STRAIN OF LEFT BICEPS MUSCLE, INITIAL ENCOUNTER: ICD-10-CM

## 2024-06-22 DIAGNOSIS — I10 ESSENTIAL HYPERTENSION: ICD-10-CM

## 2024-06-22 DIAGNOSIS — E78.1 HYPERTRIGLYCERIDEMIA: ICD-10-CM

## 2024-06-22 DIAGNOSIS — S46.912A STRAIN OF LEFT SHOULDER, INITIAL ENCOUNTER: Primary | ICD-10-CM

## 2024-06-22 DIAGNOSIS — E78.2 MIXED HYPERLIPIDEMIA: ICD-10-CM

## 2024-06-22 DIAGNOSIS — S46.912A STRAIN OF LEFT SHOULDER, INITIAL ENCOUNTER: ICD-10-CM

## 2024-06-22 PROCEDURE — 73030 X-RAY EXAM OF SHOULDER: CPT

## 2024-06-22 PROCEDURE — G0382 LEV 3 HOSP TYPE B ED VISIT: HCPCS | Performed by: EMERGENCY MEDICINE

## 2024-06-22 PROCEDURE — 99283 EMERGENCY DEPT VISIT LOW MDM: CPT | Performed by: EMERGENCY MEDICINE

## 2024-06-22 RX ORDER — AMLODIPINE BESYLATE 5 MG/1
5 TABLET ORAL DAILY
Qty: 90 TABLET | Refills: 1 | Status: SHIPPED | OUTPATIENT
Start: 2024-06-22 | End: 2024-06-25 | Stop reason: SDUPTHER

## 2024-06-22 RX ORDER — FENOFIBRIC ACID 135 MG/1
1 CAPSULE, DELAYED RELEASE ORAL DAILY
Qty: 90 CAPSULE | Refills: 1 | Status: SHIPPED | OUTPATIENT
Start: 2024-06-22

## 2024-06-24 ENCOUNTER — TELEPHONE (OUTPATIENT)
Dept: OBGYN CLINIC | Facility: HOSPITAL | Age: 49
End: 2024-06-24

## 2024-06-24 NOTE — TELEPHONE ENCOUNTER
Caller: Patient's spouse Geraldine    Doctor: Niraj    Reason for call: Kirill has seen you in the past for his left shoulder. He injured it recently and was seen at [facility] and they said he should have an MRI. Can you please put in an order for him to have one to be sure everything is ok.    Call back#: [phone]

## 2024-06-25 DIAGNOSIS — K29.30 CHRONIC SUPERFICIAL GASTRITIS WITHOUT BLEEDING: ICD-10-CM

## 2024-06-25 DIAGNOSIS — G47.00 INSOMNIA, UNSPECIFIED TYPE: ICD-10-CM

## 2024-06-25 DIAGNOSIS — I10 ESSENTIAL HYPERTENSION: ICD-10-CM

## 2024-06-25 RX ORDER — PANCRELIPASE 60000; 12000; 38000 [USP'U]/1; [USP'U]/1; [USP'U]/1
12000 CAPSULE, DELAYED RELEASE PELLETS ORAL 3 TIMES DAILY PRN
Qty: 270 CAPSULE | Refills: 1 | Status: SHIPPED | OUTPATIENT
Start: 2024-06-25 | End: 2024-07-25

## 2024-06-25 RX ORDER — ESOMEPRAZOLE MAGNESIUM 40 MG/1
40 CAPSULE, DELAYED RELEASE ORAL
Qty: 60 CAPSULE | Refills: 5 | Status: CANCELLED | OUTPATIENT
Start: 2024-06-25

## 2024-06-25 RX ORDER — ESOMEPRAZOLE MAGNESIUM 40 MG/1
40 CAPSULE, DELAYED RELEASE ORAL
Qty: 60 CAPSULE | Refills: 5 | Status: SHIPPED | OUTPATIENT
Start: 2024-06-25

## 2024-06-25 NOTE — TELEPHONE ENCOUNTER
Requested Prescriptions     Pending Prescriptions Disp Refills    amLODIPine (NORVASC) 5 mg tablet 90 tablet 1     Sig: Take 1 tablet (5 mg total) by mouth daily    ondansetron (ZOFRAN-ODT) 4 mg disintegrating tablet 30 tablet 0    esomeprazole (NexIUM) 40 MG capsule 60 capsule 5     Sig: Take 1 capsule (40 mg total) by mouth 2 (two) times a day before meals

## 2024-06-25 NOTE — TELEPHONE ENCOUNTER
Pt called in to have pharmacy updated and medications resent to updated pharmacy. Pt requested creon and nexium to [facility].     Pts primary pharmacy updated and confirmed.

## 2024-06-26 RX ORDER — AMLODIPINE BESYLATE 5 MG/1
5 TABLET ORAL DAILY
Qty: 90 TABLET | Refills: 1 | Status: SHIPPED | OUTPATIENT
Start: 2024-06-26

## 2024-06-26 RX ORDER — ONDANSETRON 4 MG/1
4 TABLET, ORALLY DISINTEGRATING ORAL EVERY 8 HOURS PRN
Qty: 30 TABLET | Refills: 0 | Status: SHIPPED | OUTPATIENT
Start: 2024-06-26

## 2024-07-02 ENCOUNTER — TELEPHONE (OUTPATIENT)
Age: 49
End: 2024-07-02

## 2024-07-02 ENCOUNTER — TELEPHONE (OUTPATIENT)
Dept: OBGYN CLINIC | Facility: MEDICAL CENTER | Age: 49
End: 2024-07-02

## 2024-07-02 NOTE — TELEPHONE ENCOUNTER
Hello,    Please advise if a forced appointment can be accommodated for the patient:    Call back #: [phone]     Insurance:     Reason for appointment: PT re-tore left Rotator cuff per Katelyn Nurse     Requested doctor and/or location: Dr Healy in [place]     [phone]  Katelyn VILCHIS made the call.       Thank you.

## 2024-07-02 NOTE — TELEPHONE ENCOUNTER
Hello,    Please advise if a forced appointment can be accommodated for the patient:    Call back #:     Insurance:  [employer]    Reason for appointment: patient had SX on shoulder last year with DR Healy Patient re injured his shoulder.  Patient in a great deal of pain. Patient already had MRI done and a copy to bring with    Requested doctor and/or location: Niraj Souza      Thank you.

## 2024-07-05 ENCOUNTER — TELEPHONE (OUTPATIENT)
Dept: GASTROENTEROLOGY | Facility: CLINIC | Age: 49
End: 2024-07-05

## 2024-07-05 DIAGNOSIS — E78.2 MIXED HYPERLIPIDEMIA: ICD-10-CM

## 2024-07-05 RX ORDER — OMEGA-3-ACID ETHYL ESTERS 1 G/1
CAPSULE, LIQUID FILLED ORAL
Qty: 360 CAPSULE | Refills: 1 | Status: SHIPPED | OUTPATIENT
Start: 2024-07-05 | End: 2024-07-07

## 2024-07-05 RX ORDER — ROSUVASTATIN CALCIUM 40 MG/1
40 TABLET, COATED ORAL DAILY
Qty: 90 TABLET | Refills: 1 | Status: SHIPPED | OUTPATIENT
Start: 2024-07-05

## 2024-07-07 DIAGNOSIS — E78.2 MIXED HYPERLIPIDEMIA: ICD-10-CM

## 2024-07-07 RX ORDER — OMEGA-3-ACID ETHYL ESTERS 1 G/1
CAPSULE, LIQUID FILLED ORAL
Qty: 360 CAPSULE | Refills: 0 | Status: SHIPPED | OUTPATIENT
Start: 2024-07-07

## 2024-07-08 ENCOUNTER — OFFICE VISIT (OUTPATIENT)
Dept: OBGYN CLINIC | Facility: CLINIC | Age: 49
End: 2024-07-08
Payer: COMMERCIAL

## 2024-07-08 ENCOUNTER — TELEPHONE (OUTPATIENT)
Age: 49
End: 2024-07-08

## 2024-07-08 VITALS
HEART RATE: 66 BPM | SYSTOLIC BLOOD PRESSURE: 128 MMHG | BODY MASS INDEX: 27.3 KG/M2 | WEIGHT: 195 LBS | HEIGHT: 71 IN | DIASTOLIC BLOOD PRESSURE: 79 MMHG

## 2024-07-08 DIAGNOSIS — S46.012A TRAUMATIC INCOMPLETE TEAR OF LEFT ROTATOR CUFF, INITIAL ENCOUNTER: ICD-10-CM

## 2024-07-08 DIAGNOSIS — Z98.890 S/P ARTHROSCOPY OF LEFT SHOULDER: Primary | ICD-10-CM

## 2024-07-08 DIAGNOSIS — M25.512 ACUTE PAIN OF LEFT SHOULDER: ICD-10-CM

## 2024-07-08 PROCEDURE — 99214 OFFICE O/P EST MOD 30 MIN: CPT | Performed by: ORTHOPAEDIC SURGERY

## 2024-07-08 NOTE — TELEPHONE ENCOUNTER
Caller: [facility] Zepeda     Doctor: Niraj     Reason for call: She called to request today's OVN but the note is not signed yet. She will call back to request that.   I was confirming insurance info and she said this should be billed under [employer], not [employer]. All the insurance information was scanned into the medica tab today.  Please add/correct insurance info     Call back#: [phone]

## 2024-07-08 NOTE — LETTER
July 8, 2024     Patient: Ashwin Wright  YOB: 1975  Date of Visit: 7/8/2024      To Whom it May Concern:    Ashwin Wright is under my professional care. Ashwin was seen in my office on 7/8/2024. Ashwin will remain out of work until his follow up appointment. He will be reevaluated again in approximately 8 weeks and a new note may be provided at that time.    If you have any questions or concerns, please don't hesitate to call.         Sincerely,          Toro Healy, DO        CC: No Recipients

## 2024-07-08 NOTE — PROGRESS NOTES
"Patient Name:  Ashwin Wright  MRN:  [id number]    Assessment & Plan     1. S/P arthroscopy of left shoulder  -     Ambulatory Referral to Physical Therapy; Future  2. Traumatic incomplete tear of left rotator cuff, initial encounter  -     Ambulatory Referral to Physical Therapy; Future  3. Acute pain of left shoulder    1 year s/p left shoulder arthroscopy rotator cuff repair, open subpectoral biceps tenodesis, acromioplasty, extensive debridement recurrent partial thickness supraspinatus tear, DOI 6/21/24  X-rays and MRI study obtained and reviewed in the office today  The patient was referred to outpatient physical therapy  OTC analgesics and ice as needed for symptom management  He will remain out of work until follow up appointment  Follow up 6-8 weeks for reevaluation    History of the Present Illness   Ashwin Wright is a 49 y.o. male approximately 1 year s/p left shoulder arthroscopy rotator cuff repair, open subpectoral biceps tenodesis, acromioplasty, extensive debridement. He was pulling down a roll door at work and felt increased pain in his shoulder on 6/21/2024. He is having increased pain and difficulty with range of motion.         Review of Systems     Review of Systems   Constitutional:  Negative for chills and fever.   HENT:  Negative for ear pain and sore throat.    Eyes:  Negative for pain and visual disturbance.   Respiratory:  Negative for cough and shortness of breath.    Cardiovascular:  Negative for chest pain and palpitations.   Gastrointestinal:  Negative for abdominal pain and vomiting.   Genitourinary:  Negative for dysuria and hematuria.   Musculoskeletal:  Negative for arthralgias and back pain.   Skin:  Negative for color change and rash.   Neurological:  Negative for seizures and syncope.   All other systems reviewed and are negative.      Physical Exam     /79   Pulse 66   Ht 5' 11\" (1.803 m)   Wt 88.5 kg (195 lb)   BMI 27.20 kg/m²     Left  Shoulder:   Active range of " motion   100 degrees forward flexion  120 degrees abduction  60 degrees external rotation   2 level restriction internal rotation      There is mild tenderness present over the greater tuberosity.   Forward flexion testing 4/5  External rotation testing 5/5  Internal rotation testing 5/5  The patient is neurovascularly intact distally in the extremity.      Data Review     I have personally reviewed pertinent films in PACS, and my interpretation follows.    X-rays taken 6/22/2024 of Left  shoulder independently reviewed and demonstrate no acute fracture or dislocation    MRI study of the left shoulder obtained 6/26/2024 demonstrates recurrent small, partial thickness articular sided tear of supraspinatus tendon with mild retraction. No atrophy    Social History     Tobacco Use    Smoking status: Former     Current packs/day: 0.00     Average packs/day: 1 pack/day for 20.0 years (20.0 ttl pk-yrs)     Types: Cigarettes     Start date: 3/15/2001     Quit date: 3/15/2021     Years since quitting: 3.3    Smokeless tobacco: Never   Vaping Use    Vaping status: Some Days    Substances: Nicotine   Substance Use Topics    Alcohol use: Yes     Alcohol/week: 2.0 standard drinks of alcohol     Types: 2 Cans of beer per week     Comment: on weekends    Drug use: Never           Procedures  None.    Toro Healy DO   Scribe Attestation      I,:  Elena Aparicio am acting as a scribe while in the presence of the attending physician.:       I,:  Toro Healy DO personally performed the services described in this documentation    as scribed in my presence.:

## 2024-07-09 ENCOUNTER — TELEPHONE (OUTPATIENT)
Age: 49
End: 2024-07-09

## 2024-07-10 ENCOUNTER — TELEPHONE (OUTPATIENT)
Age: 49
End: 2024-07-10

## 2024-07-10 NOTE — TELEPHONE ENCOUNTER
Caller: [employer]    Doctor: Niraj    Reason for call: Can physical therapy referral be faxed to them at     Side note- AVS and work note from 7/8/24 were faxed successfully during this encounter    Call back#: [phone]

## 2024-07-22 ENCOUNTER — EVALUATION (OUTPATIENT)
Dept: PHYSICAL THERAPY | Facility: CLINIC | Age: 49
End: 2024-07-22
Payer: OTHER MISCELLANEOUS

## 2024-07-22 DIAGNOSIS — Z98.890 S/P ARTHROSCOPY OF LEFT SHOULDER: ICD-10-CM

## 2024-07-22 DIAGNOSIS — S46.012A TRAUMATIC INCOMPLETE TEAR OF LEFT ROTATOR CUFF, INITIAL ENCOUNTER: ICD-10-CM

## 2024-07-22 PROCEDURE — 97140 MANUAL THERAPY 1/> REGIONS: CPT | Performed by: PHYSICAL THERAPIST

## 2024-07-22 PROCEDURE — 97161 PT EVAL LOW COMPLEX 20 MIN: CPT | Performed by: PHYSICAL THERAPIST

## 2024-07-22 NOTE — PROGRESS NOTES
PT Evaluation     Today's date: 2024  Patient name: Ashwin Wright  : 1975  MRN: [id number]  Referring provider: Toro Healy DO  Dx:   Encounter Diagnosis     ICD-10-CM    1. S/P arthroscopy of left shoulder  Z98.890 Ambulatory Referral to Physical Therapy      2. Traumatic incomplete tear of left rotator cuff, initial encounter  S46.012A Ambulatory Referral to Physical Therapy                     Assessment  Impairments: abnormal or restricted ROM, activity intolerance, impaired physical strength and pain with function    Assessment details: Pt is a 48 y/o male who presents to physical therapy with primary nociceptive pain associated with L rotator cuff related shoulder pain in the environment of reduced thoracic mobility complicated by significant history of multiple shoulder injuries and surgeries, poor nutrition with a number of GI related issues, and liver disease. Pt does not present with any red flag symptoms at this time. Pt presents with significant lack of A/PROM of the L shld, as well as pain with MMT testing. Education provided regarding POC, prognosis and HEP, pt verablized understanding. Pt would benefit from skilled physical therapy in order to decrease deficits and return to prior level of function.    Understanding of Dx/Px/POC: good    Goals  STG (4 weeks):  Pt will be independent with HEP.  Pt will demonstrate increase in flexion ROM by 5-10d.  Pt will demonstrate increase in MMT grade by 1/3 for ER.    LTG (8 weeks):  FOTO will be expected outcome.   Pt will demonstrate painfree flexion ROM comparable to contralateral limb.  Pt will demonstrate MMT grade comparable to contralateral limb in all deficient muscle groups.      Plan  Patient would benefit from: skilled physical therapy  Planned modality interventions: cryotherapy    Planned therapy interventions: manual therapy, neuromuscular re-education, patient education, self care, strengthening, stretching, therapeutic  activities, therapeutic exercise and home exercise program    Frequency: 1-2x/week.  Duration in weeks: 8  Treatment plan discussed with: patient      Subjective Evaluation    History of Present Illness  Mechanism of injury: Chief Complaint: Pt reports that on 6/21/24 while pulling his truck door down it got stuck. He forcefully pulled it down and it did not move. This put a lot of pressure on his shoulder and he had pain immediately. Pt reports that since this has happened he has had significant pain in the shoulder when he goes to move it. MRI revealed a recurrent small, partial thickness articular sided tear of supraspinatus tendon fibers at the footprint with mild retraction. He also reports pain in the lateral arm at times. Some bruising as well.    Severity: severe  Irritability: moderate  Nature: nociceptive  Stage: subacute  Stability: no change    P1: see body chart  Patient Goals  Patient goals for therapy: decreased pain and return to work        Objective     Observations     Additional Observation Details  Bruising on the L lateral arm, no evidence of change in muscle bulk of the upper arm  Bruising of the superior L shld, tender to the touch, pt unsure of how this got there- he did report he had been doing belly flops in the pool and had gotten a number of bruises on his abdomen from this- these were visualized    Cervical/Thoracic Screen   Cervical range of motion within normal limits  Cervical range of motion within normal limits with the following exceptions: No recreation of pain    Active Range of Motion   Left Shoulder   Flexion: 105 degrees with pain  Abduction: 72 degrees with pain  External rotation 0°: WFL and with pain    Right Shoulder   Normal active range of motion    Passive Range of Motion   Left Shoulder   Flexion: 105 degrees with pain  Abduction: 60 degrees with pain  External rotation 0°: WFL and with pain    Additional Passive Range of Motion Details  Empty end feel with all  PROM    Joint Play   Left Shoulder  Joints within functional limits are the posterior capsule.     Additional Joint Play Details  Hypomobile T1-3    Strength/Myotome Testing     Additional Strength Details  Painful and strong resisted testing of the L shoulder with ER, abd    Bicep/Tricep WNL             Precautions:         Chronic pain disorder      Chronic pancreatitis (HCC)      Cirrhosis (HCC)  early cirrhosis    GERD (gastroesophageal reflux disease)      History of COVID-19 01/2022 mild s/s    Hyperlipidemia      Hypertension      Liver abscess      Liver disease      Meniscus tear  left knee work injury last assessed 08/24/2016    Pancreatitis      Pneumonia      Rotator cuff tear          POC expires Unit limit Auth Expiration date PT/OT/ST + Visit Limit?   9/16/24                              Visit/Unit Tracking  AUTH Status:  Date 7/22               Used 1               Remaining                        Manuals 7/22            Phys shld mob Tarun gd 2            Thoracic mob T2-6 TARUN gd 4-5                                      Neuro Re-Ed                                                                                                        Ther Ex             UBE             Supine cane flex             Table slide                                                                 Pt edu TARUN            Ther Activity                                       Gait Training                                       Modalities

## 2024-07-25 ENCOUNTER — OFFICE VISIT (OUTPATIENT)
Dept: PHYSICAL THERAPY | Facility: CLINIC | Age: 49
End: 2024-07-25
Payer: OTHER MISCELLANEOUS

## 2024-07-25 DIAGNOSIS — Z98.890 S/P ARTHROSCOPY OF LEFT SHOULDER: Primary | ICD-10-CM

## 2024-07-25 DIAGNOSIS — S46.012A TRAUMATIC INCOMPLETE TEAR OF LEFT ROTATOR CUFF, INITIAL ENCOUNTER: ICD-10-CM

## 2024-07-25 PROCEDURE — 97140 MANUAL THERAPY 1/> REGIONS: CPT | Performed by: PHYSICAL THERAPIST

## 2024-07-25 PROCEDURE — 97110 THERAPEUTIC EXERCISES: CPT | Performed by: PHYSICAL THERAPIST

## 2024-07-25 NOTE — PROGRESS NOTES
Daily Note     Today's date: 2024  Patient name: Ashwin Wright  : 1975  MRN: [id number]  Referring provider: Toro Healy DO  Dx:   Encounter Diagnosis     ICD-10-CM    1. S/P arthroscopy of left shoulder  Z98.890       2. Traumatic incomplete tear of left rotator cuff, initial encounter  S46.012A                      Subjective: Pt reports that his shoulder is sore.       Objective: See treatment diary below      Assessment: Tolerated treatment well. Patient demonstrated fatigue post treatment, exhibited good technique with therapeutic exercises, and would benefit from continued PT. Pt had more pain with AAROM with cane flexion than table slides. Continue to progress motion as able.      Plan: Continue per plan of care.  Progress treatment as tolerated.       Precautions:         Chronic pain disorder      Chronic pancreatitis (HCC)      Cirrhosis (HCC)  early cirrhosis    GERD (gastroesophageal reflux disease)      History of COVID-19 2022 mild s/s    Hyperlipidemia      Hypertension      Liver abscess      Liver disease      Meniscus tear  left knee work injury last assessed 2016    Pancreatitis      Pneumonia      Rotator cuff tear          POC expires Unit limit Auth Expiration date PT/OT/ST + Visit Limit?   24                                Visit/Unit Tracking  AUTH Status:  Date               Used 1 2              Remaining                        Manuals            Phys shld mob Randall gd 2 KB gr            Thoracic mob T2-6 RANDALL gd 4-5 KB T2-6 gr 4                                     Neuro Re-Ed                                                                                                          Ther Ex             UBE  Alt 6'            Supine cane flex  1x10           Table slide flex  2x10           Table slide scap  2x10           Table slide abd  2x10                                     Pt edu RANDALL            Ther Activity                                        Gait Training                                       Modalities

## 2024-07-26 ENCOUNTER — HOSPITAL ENCOUNTER (OUTPATIENT)
Dept: ULTRASOUND IMAGING | Facility: HOSPITAL | Age: 49
End: 2024-07-26
Payer: COMMERCIAL

## 2024-07-26 DIAGNOSIS — K76.0 HEPATIC STEATOSIS: ICD-10-CM

## 2024-07-26 DIAGNOSIS — R79.89 ELEVATED LFTS: ICD-10-CM

## 2024-07-26 PROCEDURE — 76705 ECHO EXAM OF ABDOMEN: CPT

## 2024-07-26 PROCEDURE — 76981 USE PARENCHYMA: CPT

## 2024-07-29 ENCOUNTER — OFFICE VISIT (OUTPATIENT)
Dept: PHYSICAL THERAPY | Facility: CLINIC | Age: 49
End: 2024-07-29
Payer: OTHER MISCELLANEOUS

## 2024-07-29 DIAGNOSIS — Z98.890 S/P ARTHROSCOPY OF LEFT SHOULDER: Primary | ICD-10-CM

## 2024-07-29 DIAGNOSIS — S46.012A TRAUMATIC INCOMPLETE TEAR OF LEFT ROTATOR CUFF, INITIAL ENCOUNTER: ICD-10-CM

## 2024-07-29 PROCEDURE — 97140 MANUAL THERAPY 1/> REGIONS: CPT | Performed by: PHYSICAL THERAPIST

## 2024-07-29 PROCEDURE — 97110 THERAPEUTIC EXERCISES: CPT | Performed by: PHYSICAL THERAPIST

## 2024-07-29 NOTE — PROGRESS NOTES
"Daily Note     Today's date: 2024  Patient name: Ashwin Wright  : 1975  MRN: [id number]  Referring provider: Toro Healy DO  Dx:   Encounter Diagnosis     ICD-10-CM    1. S/P arthroscopy of left shoulder  Z98.890       2. Traumatic incomplete tear of left rotator cuff, initial encounter  S46.012A                      Subjective: Pt reports that his shoulder has been \"popping\" - not painful, just a sensation.   C/o soreness with PT session - mild reporting that he has \" been babying it\" and now we are moving it a little more.      Objective: See treatment diary below  AA flexion = 175  abd= 180  ER = 90  IR = 65 with end range discomfort reported    Assessment: Tolerated treatment well.   Patient with good AAROM all planes -  cueing to minimize compensatory movements.       Plan: Continue per plan of care.  Progress treatment as tolerated.       Precautions:         Chronic pain disorder      Chronic pancreatitis (HCC)      Cirrhosis (HCC)  early cirrhosis    GERD (gastroesophageal reflux disease)      History of COVID-19 2022 mild s/s    Hyperlipidemia      Hypertension      Liver abscess      Liver disease      Meniscus tear  left knee work injury last assessed 2016    Pancreatitis      Pneumonia      Rotator cuff tear          POC expires Unit limit Auth Expiration date PT/OT/ST + Visit Limit?   24                                Visit/Unit Tracking  AUTH Status:  Date              Used 1 2 3             Remaining                        Manuals           Phys shld mob Randall gd 2 KB gr  db          Thoracic mob T2-6 RANDALL gd 4-5 KB T2-6 gr 4                                     Neuro Re-Ed                                       pulleys   3' 10s                        TRX walk outs  flex/ ext   10s x 5 ea                                       Ther Ex             UBE  Alt 6'  3'/3'          Supine cane flex  1x10 20x          Supine cane cp   20x            Table " slide flex  2x10 2 x 10          Table slide scap  2x10 2 x 10          Table slide abd  2x10 2 x 10                                     Pt edu TARUN            Ther Activity                                       Gait Training                                       Modalities

## 2024-08-01 ENCOUNTER — APPOINTMENT (OUTPATIENT)
Dept: PHYSICAL THERAPY | Facility: CLINIC | Age: 49
End: 2024-08-01
Payer: OTHER MISCELLANEOUS

## 2024-08-05 ENCOUNTER — OFFICE VISIT (OUTPATIENT)
Dept: PHYSICAL THERAPY | Facility: CLINIC | Age: 49
End: 2024-08-05
Payer: OTHER MISCELLANEOUS

## 2024-08-05 DIAGNOSIS — Z98.890 S/P ARTHROSCOPY OF LEFT SHOULDER: Primary | ICD-10-CM

## 2024-08-05 DIAGNOSIS — S46.012A TRAUMATIC INCOMPLETE TEAR OF LEFT ROTATOR CUFF, INITIAL ENCOUNTER: ICD-10-CM

## 2024-08-05 PROCEDURE — 97140 MANUAL THERAPY 1/> REGIONS: CPT | Performed by: PHYSICAL THERAPIST

## 2024-08-05 PROCEDURE — 97110 THERAPEUTIC EXERCISES: CPT | Performed by: PHYSICAL THERAPIST

## 2024-08-05 NOTE — PROGRESS NOTES
Daily Note     Today's date: 2024  Patient name: Ashwin Wright  : 1975  MRN: [id number]  Referring provider: Toro Healy DO  Dx:   Encounter Diagnosis     ICD-10-CM    1. S/P arthroscopy of left shoulder  Z98.890       2. Traumatic incomplete tear of left rotator cuff, initial encounter  S46.012A                      Subjective: Pt reports he is tired this morning. His wife had to leave the area for a family emergency so he has to take care of the house and pets. He went to bed at 2am last night, got up at 6am. Noting continued pain and inability to lift his arm overhead. Pt reports he is consistent with HEP. He reports not noticing much change at this point.       Objective: See treatment diary below      Assessment: Tolerated treatment fair. No change after phys flex mobs. Improvement in shld flexion with supine cane flex. No change following table slide. Patient would benefit from continued PT      Plan: Continue per plan of care.  Progress treatment as tolerated.       Precautions:         Chronic pain disorder      Chronic pancreatitis (HCC)      Cirrhosis (HCC)  early cirrhosis    GERD (gastroesophageal reflux disease)      History of COVID-19 2022 mild s/s    Hyperlipidemia      Hypertension      Liver abscess      Liver disease      Meniscus tear  left knee work injury last assessed 2016    Pancreatitis      Pneumonia      Rotator cuff tear          POC expires Unit limit Auth Expiration date PT/OT/ST + Visit Limit?   24                                Visit/Unit Tracking  AUTH Status:  Date              Used 1 2 3             Remaining                        Manuals  8/5         Phys shld mob Randall gd 2 KB gr  db RANDALL gd 3         Thoracic mob T2-6 RANDALL gd 4-5 KB T2-6 gr 4                                     Neuro Re-Ed                                       pulleys   3' 10s                        TRX walk outs  flex/ ext   10s x 5 ea                       "                 Ther Ex             UBE  Alt 6'  3'/3' 3'/3'         Supine cane flex  1x10 20x 25x         Supine cane cp   20x   25x         Table slide flex  2x10 2 x 10 25x         Table slide scap  2x10 2 x 10          Table slide abd  2x10 2 x 10           ER iso    5x10\"                      Pt edu TARUN            Ther Activity                                       Gait Training                                       Modalities                                              "

## 2024-08-07 ENCOUNTER — OFFICE VISIT (OUTPATIENT)
Dept: PHYSICAL THERAPY | Facility: CLINIC | Age: 49
End: 2024-08-07
Payer: OTHER MISCELLANEOUS

## 2024-08-07 DIAGNOSIS — S46.012A TRAUMATIC INCOMPLETE TEAR OF LEFT ROTATOR CUFF, INITIAL ENCOUNTER: ICD-10-CM

## 2024-08-07 DIAGNOSIS — Z98.890 S/P ARTHROSCOPY OF LEFT SHOULDER: Primary | ICD-10-CM

## 2024-08-07 PROCEDURE — 97110 THERAPEUTIC EXERCISES: CPT | Performed by: PHYSICAL THERAPIST

## 2024-08-07 NOTE — PROGRESS NOTES
Daily Note     Today's date: 2024  Patient name: Ashwin Wright  : 1975  MRN: [id number]  Referring provider: Toro Healy DO  Dx:   Encounter Diagnosis     ICD-10-CM    1. S/P arthroscopy of left shoulder  Z98.890       2. Traumatic incomplete tear of left rotator cuff, initial encounter  S46.012A                      Subjective: Pt reports soreness for a few hours following last treatment.       Objective: See treatment diary below      Assessment: Tolerated treatment fair. Due to full PROM, moved away from manual PROM. Unable to add MRE, instead relied on AAROM. Progressed to more shld isometrics. Some increased soreness with shld flexion following but same ROM. Patient would benefit from continued PT      Plan: Continue per plan of care.  Progress treatment as tolerated.       Precautions:         Chronic pain disorder      Chronic pancreatitis (HCC)      Cirrhosis (HCC)  early cirrhosis    GERD (gastroesophageal reflux disease)      History of COVID-19 2022 mild s/s    Hyperlipidemia      Hypertension      Liver abscess      Liver disease      Meniscus tear  left knee work injury last assessed 2016    Pancreatitis      Pneumonia      Rotator cuff tear          POC expires Unit limit Auth Expiration date PT/OT/ST + Visit Limit?   24                                Visit/Unit Tracking  AUTH Status:  Date              Used 1 2 3             Remaining                        Manuals         Phys shld mob Randall gd 2 KB gr  db RANDALL gd 3         Thoracic mob T2-6 RANDALL gd 4-5 KB T2-6 gr 4                                     Neuro Re-Ed                                       pulleys   3' 10s                        TRX walk outs  flex/ ext   10s x 5 ea                                       Ther Ex             UBE  Alt 6'  3'/3' 3'/3' 3'/3'        Supine cane flex  1x10 20x 25x 25x        Supine cane cp   20x   25x         Table slide flex  2x10 2 x 10 25x 25x       "  Table slide scap  2x10 2 x 10          Table slide abd  2x10 2 x 10           ER iso    5x10\" 5x10\"        Shld iso     Abd 5x10\"        Debby's     3'        Pt edu TARUN            Ther Activity                                       Gait Training                                       Modalities                                                "

## 2024-08-10 DIAGNOSIS — I10 ESSENTIAL HYPERTENSION: ICD-10-CM

## 2024-08-11 RX ORDER — LISINOPRIL 20 MG/1
20 TABLET ORAL DAILY
Qty: 90 TABLET | Refills: 1 | Status: SHIPPED | OUTPATIENT
Start: 2024-08-11

## 2024-08-12 ENCOUNTER — OFFICE VISIT (OUTPATIENT)
Dept: PHYSICAL THERAPY | Facility: CLINIC | Age: 49
End: 2024-08-12
Payer: OTHER MISCELLANEOUS

## 2024-08-12 DIAGNOSIS — Z98.890 S/P ARTHROSCOPY OF LEFT SHOULDER: Primary | ICD-10-CM

## 2024-08-12 DIAGNOSIS — S46.012A TRAUMATIC INCOMPLETE TEAR OF LEFT ROTATOR CUFF, INITIAL ENCOUNTER: ICD-10-CM

## 2024-08-12 PROCEDURE — 97110 THERAPEUTIC EXERCISES: CPT

## 2024-08-12 NOTE — PROGRESS NOTES
Daily Note     Today's date: 2024  Patient name: Ashwin Wright  : 1975  MRN: [id number]  Referring provider: Toro Healy DO  Dx:   Encounter Diagnosis     ICD-10-CM    1. S/P arthroscopy of left shoulder  Z98.890       2. Traumatic incomplete tear of left rotator cuff, initial encounter  S46.012A                      Subjective: Pt reports on  his dog pulled him down the stairs and he fell on his R side, which is now bruised.  He denies falling on L shoulder and denies significant complications with this.  He notes popping in L shoulder and difficulty with pushing/pulling.        Objective: See treatment diary below      Assessment: Painful arc during AAROM.  Completes all other interventions without significant complications.  He does experience popping in L shoulder with overhead reaching.  Pt would benefit from continued PT      Plan: Continue per plan of care.      Precautions:         Chronic pain disorder      Chronic pancreatitis (HCC)      Cirrhosis (HCC)  early cirrhosis    GERD (gastroesophageal reflux disease)      History of COVID-19 2022 mild s/s    Hyperlipidemia      Hypertension      Liver abscess      Liver disease      Meniscus tear  left knee work injury last assessed 2016    Pancreatitis      Pneumonia      Rotator cuff tear          POC expires Unit limit Auth Expiration date PT/OT/ST + Visit Limit?   24                                Visit/Unit Tracking  AUTH Status:  Date              Used 1 2 3             Remaining                        Manuals        Phys shld mob Randall gd 2 KB gr  db RANDALL gd 3         Thoracic mob T2-6 RANDALL gd 4-5 KB T2-6 gr 4                                     Neuro Re-Ed                                       pulleys   3' 10s                        TRX walk outs  flex/ ext   10s x 5 ea                                       Ther Ex             UBE  Alt 6'  3'/3' 3'/3' 3'/3' 3'/3'       Supine cane  "flex  1x10 20x 25x 25x x25       Supine cane cp   20x   25x         Table slide flex  2x10 2 x 10 25x 25x Elevated x30       Table slide scap  2x10 2 x 10          Table slide abd  2x10 2 x 10           ER iso    5x10\" 5x10\" 10\"x5       Shld iso     Abd 5x10\" 10\"x5       Shoulder flex c ball squeeze      2x10       Pulley's     3' 3'       Pt edu TARUN            Ther Activity                                       Gait Training                                       Modalities                                                  "

## 2024-08-14 ENCOUNTER — OFFICE VISIT (OUTPATIENT)
Dept: PHYSICAL THERAPY | Facility: CLINIC | Age: 49
End: 2024-08-14
Payer: OTHER MISCELLANEOUS

## 2024-08-14 DIAGNOSIS — Z98.890 S/P ARTHROSCOPY OF LEFT SHOULDER: ICD-10-CM

## 2024-08-14 DIAGNOSIS — S46.012A TRAUMATIC INCOMPLETE TEAR OF LEFT ROTATOR CUFF, INITIAL ENCOUNTER: Primary | ICD-10-CM

## 2024-08-14 PROCEDURE — 97112 NEUROMUSCULAR REEDUCATION: CPT | Performed by: PHYSICAL THERAPIST

## 2024-08-14 PROCEDURE — 97110 THERAPEUTIC EXERCISES: CPT | Performed by: PHYSICAL THERAPIST

## 2024-08-14 NOTE — PROGRESS NOTES
Daily Note     Today's date: 2024  Patient name: Ashwin Wright  : 1975  MRN: [id number]  Referring provider: Toro Healy DO  Dx:   Encounter Diagnosis     ICD-10-CM    1. Traumatic incomplete tear of left rotator cuff, initial encounter  S46.012A       2. S/P arthroscopy of left shoulder  Z98.890                      Subjective: Pt reports he is sweating a lot today due to the heat.       Objective: See treatment diary below      Assessment: Tolerated treatment well. Did notice diaphoresis in the forehead throughout session, internal temp of the clinic was 70d. Able to complete all listed exercise without increase in shoulder symptoms. Patient would benefit from continued PT      Plan: Continue per plan of care.  Progress treatment as tolerated.       Precautions:         Chronic pain disorder      Chronic pancreatitis (HCC)      Cirrhosis (HCC)  early cirrhosis    GERD (gastroesophageal reflux disease)      History of COVID-19 2022 mild s/s    Hyperlipidemia      Hypertension      Liver abscess      Liver disease      Meniscus tear  left knee work injury last assessed 2016    Pancreatitis      Pneumonia      Rotator cuff tear          POC expires Unit limit Auth Expiration date PT/OT/ST + Visit Limit?   24                                Visit/Unit Tracking  AUTH Status:  Date              Used 1 2 3             Remaining                        Manuals       Phys shld mob Randall gd 2 KB gr  db RANDALL gd 3         Thoracic mob T2-6 RANDALL gd 4-5 KB T2-6 gr 4                                     Neuro Re-Ed             Banded TB shld ext       GTB 2x10      Banded TB row       BTB 2x10      pulleys   3' 10s                        TRX walk outs  flex/ ext   10s x 5 ea                                       Ther Ex             UBE  Alt 6'  3'/3' 3'/3' 3'/3' 3'/3' 3'/3'      Supine cane flex  1x10 20x 25x 25x x25 x25      Supine cane cp   20x   25x        "  Table slide flex  2x10 2 x 10 25x 25x Elevated x30 Elevated x30      Table slide scap  2x10 2 x 10          Table slide abd  2x10 2 x 10           ER iso    5x10\" 5x10\" 10\"x5 10x5\"      Shld iso     Abd 5x10\" 10\"x5       Shoulder flex c ball squeeze      2x10       Pulley's     3' 3' 3'      Pt edu TARUN            Ther Activity                                       Gait Training                                       Modalities                                                    "

## 2024-08-15 ENCOUNTER — TELEPHONE (OUTPATIENT)
Dept: OBGYN CLINIC | Facility: CLINIC | Age: 49
End: 2024-08-15

## 2024-08-19 ENCOUNTER — OFFICE VISIT (OUTPATIENT)
Dept: OBGYN CLINIC | Facility: CLINIC | Age: 49
End: 2024-08-19
Payer: OTHER MISCELLANEOUS

## 2024-08-19 ENCOUNTER — OFFICE VISIT (OUTPATIENT)
Dept: PHYSICAL THERAPY | Facility: CLINIC | Age: 49
End: 2024-08-19
Payer: OTHER MISCELLANEOUS

## 2024-08-19 ENCOUNTER — TELEPHONE (OUTPATIENT)
Age: 49
End: 2024-08-19

## 2024-08-19 VITALS
SYSTOLIC BLOOD PRESSURE: 120 MMHG | BODY MASS INDEX: 26.82 KG/M2 | DIASTOLIC BLOOD PRESSURE: 79 MMHG | HEIGHT: 71 IN | WEIGHT: 191.6 LBS | HEART RATE: 68 BPM

## 2024-08-19 DIAGNOSIS — S46.012D TRAUMATIC INCOMPLETE TEAR OF LEFT ROTATOR CUFF, SUBSEQUENT ENCOUNTER: Primary | ICD-10-CM

## 2024-08-19 DIAGNOSIS — Z98.890 S/P ARTHROSCOPY OF LEFT SHOULDER: ICD-10-CM

## 2024-08-19 DIAGNOSIS — S46.012A TRAUMATIC INCOMPLETE TEAR OF LEFT ROTATOR CUFF, INITIAL ENCOUNTER: Primary | ICD-10-CM

## 2024-08-19 PROCEDURE — 97110 THERAPEUTIC EXERCISES: CPT | Performed by: PHYSICAL THERAPIST

## 2024-08-19 PROCEDURE — 97112 NEUROMUSCULAR REEDUCATION: CPT | Performed by: PHYSICAL THERAPIST

## 2024-08-19 PROCEDURE — 99213 OFFICE O/P EST LOW 20 MIN: CPT | Performed by: ORTHOPAEDIC SURGERY

## 2024-08-19 NOTE — TELEPHONE ENCOUNTER
Caller: Soumya    Doctor/Office: Niraj RODRIGUEZ#: [phone]      What needs to be faxed: AVS and worknote from today    ATTN to: Katelyn    Fax#: [phone]      Documents were successfully e-faxed

## 2024-08-19 NOTE — PROGRESS NOTES
Daily Note     Today's date: 2024  Patient name: Ashwin Wright  : 1975  MRN: [id number]  Referring provider: Toro Healy DO  Dx:   Encounter Diagnosis     ICD-10-CM    1. Traumatic incomplete tear of left rotator cuff, initial encounter  S46.012A       2. S/P arthroscopy of left shoulder  Z98.890                      Subjective: Pt reports that he was drinking yesterday and feels tired this morning.       Objective: See treatment diary below      Assessment: Tolerated treatment fair. Shld flexion asterisk with minimal improvement with exercise today. Fatigue following and soreness. Patient would benefit from continued PT      Plan: Continue per plan of care.  Progress treatment as tolerated.       Precautions:         Chronic pain disorder      Chronic pancreatitis (HCC)      Cirrhosis (HCC)  early cirrhosis    GERD (gastroesophageal reflux disease)      History of COVID-19 2022 mild s/s    Hyperlipidemia      Hypertension      Liver abscess      Liver disease      Meniscus tear  left knee work injury last assessed 2016    Pancreatitis      Pneumonia      Rotator cuff tear          POC expires Unit limit Auth Expiration date PT/OT/ST + Visit Limit?   24                                Visit/Unit Tracking  AUTH Status:  Date              Used 1 2 3             Remaining                        Manuals      Phys shld mob Randall gd 2 KB gr  db RANDALL gd 3         Thoracic mob T2-6 RANDALL gd 4-5 KB T2-6 gr 4                                     Neuro Re-Ed             Banded TB shld ext       GTB 2x10      Banded TB row       BTB 2x10 High row BTB 3x15     pulleys   3' 10s           Banded ER        YTB 2x10     TRX walk outs  flex/ ext   10s x 5 ea                                       Ther Ex             UBE  Alt 6'  3'/3' 3'/3' 3'/3' 3'/3' 3'/3' 3'/3'     Supine cane flex  1x10 20x 25x 25x x25 x25      Supine cane cp   20x   25x         Table  "slide flex  2x10 2 x 10 25x 25x Elevated x30 Elevated x30 BTB 2x15     Table slide scap  2x10 2 x 10          Table slide abd  2x10 2 x 10           ER iso    5x10\" 5x10\" 10\"x5 10x5\"      Shld iso     Abd 5x10\" 10\"x5                    Shoulder flex c ball squeeze      2x10       Pulley's     3' 3' 3' 3'     Pt [facility] TARUN            Ther Activity                                       Gait Training                                       Modalities                                                      "

## 2024-08-19 NOTE — LETTER
August 19, 2024     Patient: Ashwin Wright  YOB: 1975  Date of Visit: 8/19/2024      To Whom it May Concern:    Ashwin Wright is under my professional care. Ashwin was seen in my office on 8/19/2024. Ashwin may return to work with limitations including:    No overhead work.   No lifting than greater 10lbs to waist height.  No pushing or pulling greater than 10lbs.    No climbing elevated structures or ladders.    If you have any questions or concerns, please don't hesitate to call.         Sincerely,          Toro Healy,         CC: No Recipients

## 2024-08-21 ENCOUNTER — OFFICE VISIT (OUTPATIENT)
Dept: PHYSICAL THERAPY | Facility: CLINIC | Age: 49
End: 2024-08-21
Payer: OTHER MISCELLANEOUS

## 2024-08-21 DIAGNOSIS — Z98.890 S/P ARTHROSCOPY OF LEFT SHOULDER: ICD-10-CM

## 2024-08-21 DIAGNOSIS — S46.012A TRAUMATIC INCOMPLETE TEAR OF LEFT ROTATOR CUFF, INITIAL ENCOUNTER: Primary | ICD-10-CM

## 2024-08-21 PROCEDURE — 97110 THERAPEUTIC EXERCISES: CPT | Performed by: PHYSICAL THERAPIST

## 2024-08-21 PROCEDURE — 97112 NEUROMUSCULAR REEDUCATION: CPT | Performed by: PHYSICAL THERAPIST

## 2024-08-21 NOTE — PROGRESS NOTES
Daily Note     Today's date: 2024  Patient name: Ashwin Wright  : 1975  MRN: [id number]  Referring provider: Toro Healy DO  Dx:   Encounter Diagnosis     ICD-10-CM    1. Traumatic incomplete tear of left rotator cuff, initial encounter  S46.012A       2. S/P arthroscopy of left shoulder  Z98.890                      Subjective: Pt reports his head feels better than last session. He states that he was sore in L shld, it reduced in a day. 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       Objective: See treatment diary below      Assessment: UBE for muscular endurance. Tolerated treatment well. Flex AROM 105d. Able to achieve higher flexion with shorter lever arm OH press. No reports of pain throughout. Patient would benefit from continued PT      Plan: Continue per plan of care.  Progress treatment as tolerated.       Precautions:         Chronic pain disorder      Chronic pancreatitis (HCC)      Cirrhosis (HCC)  early cirrhosis    GERD (gastroesophageal reflux disease)      History of COVID-19 2022 mild s/s    Hyperlipidemia      Hypertension      Liver abscess      Liver disease      Meniscus tear  left knee work injury last assessed 2016    Pancreatitis      Pneumonia      Rotator cuff tear          POC expires Unit limit Auth Expiration date PT/OT/ST + Visit Limit?   24                                Visit/Unit Tracking  AUTH Status:  Date              Used 1 2 3             Remaining                        Manuals     Phys shld mob Randall gd 2 KB gr  db RANDALL gd 3         Thoracic mob T2-6 RANDALL gd 4-5 KB T2-6 gr 4                                     Neuro Re-Ed             Banded TB shld ext       GTB 2x10      Banded TB row       BTB 2x10 High row BTB 3x15 High row BTB 3x15    pulleys   3' 10s           Banded ER        YTB 2x10 YTB 2x10    TRX walk outs  flex/ ext   10s x 5 ea                                       Ther Ex    "          UBE  Alt 6'  3'/3' 3'/3' 3'/3' 3'/3' 3'/3' 3'/3' 3'/3'    Supine cane flex  1x10 20x 25x 25x x25 x25      Supine cane cp   20x   25x         Table slide flex  2x10 2 x 10 25x 25x Elevated x30 Elevated x30 BTB 2x15 BTB 2x15    OH press         No weight  2x10    Table slide scap  2x10 2 x 10          Table slide abd  2x10 2 x 10           ER iso    5x10\" 5x10\" 10\"x5 10x5\"      Shld iso     Abd 5x10\" 10\"x5       Bicep curl         5# db's 2x10    Tricep pushdown         12# 2x10    Shoulder flex c ball squeeze      2x10       Pulley's     3' 3' 3' 3' 3'    Pt edu TARUN            Ther Activity                                       Gait Training                                       Modalities                                                        "

## 2024-08-26 ENCOUNTER — OFFICE VISIT (OUTPATIENT)
Dept: PHYSICAL THERAPY | Facility: CLINIC | Age: 49
End: 2024-08-26
Payer: OTHER MISCELLANEOUS

## 2024-08-26 DIAGNOSIS — S46.012A TRAUMATIC INCOMPLETE TEAR OF LEFT ROTATOR CUFF, INITIAL ENCOUNTER: Primary | ICD-10-CM

## 2024-08-26 DIAGNOSIS — Z98.890 S/P ARTHROSCOPY OF LEFT SHOULDER: ICD-10-CM

## 2024-08-26 PROCEDURE — 97112 NEUROMUSCULAR REEDUCATION: CPT | Performed by: PHYSICAL THERAPIST

## 2024-08-26 PROCEDURE — 97110 THERAPEUTIC EXERCISES: CPT | Performed by: PHYSICAL THERAPIST

## 2024-08-26 NOTE — PROGRESS NOTES
Daily Note     Today's date: 2024  Patient name: Ashwin Wright  : 1975  MRN: [id number]  Referring provider: Toro Healy DO  Dx:   Encounter Diagnosis     ICD-10-CM    1. Traumatic incomplete tear of left rotator cuff, initial encounter  S46.012A       2. S/P arthroscopy of left shoulder  Z98.890                      Subjective: Pt reports a day of soreness following last session. He states that he feels it is getting better.       Objective: See treatment diary below    Flex: 120d  Abd: 95d      Assessment: UBE for muscular endurance. Tolerated treatment well. Improvement in asterisks prior to treatment today. Able to fatigue muscles by the end of treatment as evidenced by decreased AROM overhead which is the goal at this phase of the treatment. Progressing as able. 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 Patient would benefit from continued PT      Plan: Continue per plan of care.  Progress treatment as tolerated.       Precautions:         Chronic pain disorder      Chronic pancreatitis (HCC)      Cirrhosis (HCC)  early cirrhosis    GERD (gastroesophageal reflux disease)      History of COVID-19 2022 mild s/s    Hyperlipidemia      Hypertension      Liver abscess      Liver disease      Meniscus tear  left knee work injury last assessed 2016    Pancreatitis      Pneumonia      Rotator cuff tear          POC expires Unit limit Auth Expiration date PT/OT/ST + Visit Limit?   24                                Visit/Unit Tracking  AUTH Status:  Date              Used 1 2 3             Remaining                        Manuals  8//   Phys shld mob Randall gd 2 KB gr  db RANDALL gd 3         Thoracic mob T2-6 RANDALL gd 4-5 KB T2-6 gr 4                                     Neuro Re-Ed             Banded TB shld ext       GTB 2x10      Banded TB row        "BTB 2x10 High row BTB 3x15 High row BTB 3x15    pulley   3' 10s                        Banded ER        YTB 2x10 YTB 2x10 YTB 2x15   TRX walk outs  flex/ ext   10s x 5 ea                                       Ther Ex             UBE  Alt 6'  3'/3' 3'/3' 3'/3' 3'/3' 3'/3' 3'/3' 3'/3' 3'/3'   Supine cane flex  1x10 20x 25x 25x x25 x25      Supine cane cp   20x   25x         Table slide flex  2x10 2 x 10 25x 25x Elevated x30 Elevated x30 BTB 2x15 BTB 2x15    OH press         No weight  2x10 No weight 2x15   Db row          10# 2x18,15   Table slide scap  2x10 2 x 10          Table slide abd  2x10 2 x 10           ER iso    5x10\" 5x10\" 10\"x5 10x5\"      Shld iso     Abd 5x10\" 10\"x5       Bicep curl         5# db's 2x10    Tricep pushdown         12# 2x10    Shoulder flex c ball squeeze      2x10       Pulley's     3' 3' 3' 3' 3'    Pt edu TARUN         TARUN   Ther Activity                                       Gait Training                                       Modalities                                                          "

## 2024-08-28 ENCOUNTER — OFFICE VISIT (OUTPATIENT)
Dept: PHYSICAL THERAPY | Facility: CLINIC | Age: 49
End: 2024-08-28
Payer: OTHER MISCELLANEOUS

## 2024-08-28 DIAGNOSIS — S46.012A TRAUMATIC INCOMPLETE TEAR OF LEFT ROTATOR CUFF, INITIAL ENCOUNTER: Primary | ICD-10-CM

## 2024-08-28 DIAGNOSIS — Z98.890 S/P ARTHROSCOPY OF LEFT SHOULDER: ICD-10-CM

## 2024-08-28 PROCEDURE — 97110 THERAPEUTIC EXERCISES: CPT | Performed by: PHYSICAL THERAPIST

## 2024-08-28 NOTE — PROGRESS NOTES
Daily Note     Today's date: 2024  Patient name: Ashwin Wright  : 1975  MRN: [id number]  Referring provider: Toro Healy DO  Dx:   Encounter Diagnosis     ICD-10-CM    1. Traumatic incomplete tear of left rotator cuff, initial encounter  S46.012A       2. S/P arthroscopy of left shoulder  Z98.890                      Subjective: Pt reports that his shoulder is a little sore still from last treatment.       Objective: See treatment diary below      Assessment: UBE for muscular endurance. Tolerated treatment well. Able to complete all listed exercise. Discussed HEP and provided to pt. Pt verbalized understanding. Patient would benefit from continued PT      Plan: Continue per plan of care.  Progress treatment as tolerated.       Precautions:         Chronic pain disorder      Chronic pancreatitis (HCC)      Cirrhosis (HCC)  early cirrhosis    GERD (gastroesophageal reflux disease)      History of COVID-19 2022 mild s/s    Hyperlipidemia      Hypertension      Liver abscess      Liver disease      Meniscus tear  left knee work injury last assessed 2016    Pancreatitis      Pneumonia      Rotator cuff tear          Code: [id number]     POC expires Unit limit Auth Expiration date PT/OT/ST + Visit Limit?   24                                Visit/Unit Tracking  AUTH Status:  Date              Used 1 2 3             Remaining                        Manuals    Phys shld mob   db TARUN gd 3         Thoracic mob                                       Neuro Re-Ed             Banded TB shld ext       GTB 2x10      Banded TB row       BTB 2x10 High row BTB 3x15 High row BTB 3x15    pulley   3' 10s                        Banded ER RTB 2x15       YTB 2x10 YTB 2x10 YTB 2x15   TRX walk outs  flex/ ext   10s x 5 ea                                       Ther Ex             UBE 3'/3'  3'/3' 3'/3' 3'/3' 3'/3' 3'/3' 3'/3' 3'/3' 3'/3'   Supine cane flex    "20x 25x 25x x25 x25      Supine cane cp   20x   25x         Table slide flex   2 x 10 25x 25x Elevated x30 Elevated x30 BTB 2x15 BTB 2x15    OH press No weight 2x15        No weight  2x10 No weight 2x15   Db row 10# 2x15         10# 2x18,15   Lateral db raise  NV           Table slide scap   2 x 10          Table slide abd   2 x 10           ER iso    5x10\" 5x10\" 10\"x5 10x5\"      Shld iso     Abd 5x10\" 10\"x5       Bicep curl 5# 2x12        5# db's 2x10    Tricep pushdown Black TB 2x12        12# 2x10    Shoulder flex c ball squeeze      2x10       Pulley's     3' 3' 3' 3' 3'    Pt [facility]          TARUN   Ther Activity                                       Gait Training                                       Modalities                                                            "

## 2024-09-04 ENCOUNTER — OFFICE VISIT (OUTPATIENT)
Dept: PHYSICAL THERAPY | Facility: CLINIC | Age: 49
End: 2024-09-04
Payer: OTHER MISCELLANEOUS

## 2024-09-04 DIAGNOSIS — S46.012A TRAUMATIC INCOMPLETE TEAR OF LEFT ROTATOR CUFF, INITIAL ENCOUNTER: Primary | ICD-10-CM

## 2024-09-04 DIAGNOSIS — Z98.890 S/P ARTHROSCOPY OF LEFT SHOULDER: ICD-10-CM

## 2024-09-04 PROCEDURE — 97110 THERAPEUTIC EXERCISES: CPT | Performed by: PHYSICAL THERAPIST

## 2024-09-04 PROCEDURE — 97112 NEUROMUSCULAR REEDUCATION: CPT | Performed by: PHYSICAL THERAPIST

## 2024-09-04 NOTE — PROGRESS NOTES
Daily Note     Today's date: 2024  Patient name: Ashwin Wright  : 1975  MRN: [id number]  Referring provider: Toro Healy DO  Dx: No diagnosis found.               Subjective: Pt reports that he was sore for ~1 day following last session.       Objective: See treatment diary below      Assessment: Tolerated treatment well. Patient would benefit from continued PT      Plan: Continue per plan of care.  Progress treatment as tolerated.       Precautions:         Chronic pain disorder      Chronic pancreatitis (HCC)      Cirrhosis (HCC)  early cirrhosis    GERD (gastroesophageal reflux disease)      History of COVID-19 2022 mild s/s    Hyperlipidemia      Hypertension      Liver abscess      Liver disease      Meniscus tear  left knee work injury last assessed 2016    Pancreatitis      Pneumonia      Rotator cuff tear          Code: [id number]     POC expires Unit limit Auth Expiration date PT/OT/ST + Visit Limit?   24                                Visit/Unit Tracking  AUTH Status:  Date              Used 1 2 3             Remaining                        Manuals    Phys shld mob   db TARUN gd 3         Thoracic mob                                       Neuro Re-Ed             Banded TB shld ext       GTB 2x10      Banded TB row       BTB 2x10 High row BTB 3x15 High row BTB 3x15    pulley   3' 10s                        Banded ER RTB 2x15 RTB 3x12      YTB 2x10 YTB 2x10 YTB 2x15   TRX walk outs  flex/ ext   10s x 5 ea           Prone T  Incline 2# 2x10                          Ther Ex             UBE 3'/3' 3'/3' 3'/3' 3'/3' 3'/3' 3'/3' 3'/3' 3'/3' 3'/3' 3'/3'   Supine cane flex   20x 25x 25x x25 x25      Supine cane cp   20x   25x         Table slide flex   2 x 10 25x 25x Elevated x30 Elevated x30 BTB 2x15 BTB 2x15    OH press No weight 2x15 2# 3x10       No weight  2x10 No weight 2x15   Db row 10# 2x15 15# 2x8        10# 2x18,15  "  Lateral db raise  1# 2x10           Table slide scap   2 x 10          Table slide abd   2 x 10           ER iso    5x10\" 5x10\" 10\"x5 10x5\"      Shld iso     Abd 5x10\" 10\"x5       Bicep curl 5# 2x12 7# 3x10       5# db's 2x10    Tricep pushdown Black TB 2x12        12# 2x10    Skull crushers  5# 3x10           Shoulder flex c ball squeeze      2x10       Pulley's     3' 3' 3' 3' 3'    Pt edu          TARUN   Ther Activity                                       Gait Training                                       Modalities                                                              "

## 2024-09-06 ENCOUNTER — APPOINTMENT (OUTPATIENT)
Dept: PHYSICAL THERAPY | Facility: CLINIC | Age: 49
End: 2024-09-06
Payer: OTHER MISCELLANEOUS

## 2024-09-09 ENCOUNTER — OFFICE VISIT (OUTPATIENT)
Dept: PHYSICAL THERAPY | Facility: CLINIC | Age: 49
End: 2024-09-09
Payer: OTHER MISCELLANEOUS

## 2024-09-09 DIAGNOSIS — S46.012A TRAUMATIC INCOMPLETE TEAR OF LEFT ROTATOR CUFF, INITIAL ENCOUNTER: Primary | ICD-10-CM

## 2024-09-09 DIAGNOSIS — Z98.890 S/P ARTHROSCOPY OF LEFT SHOULDER: ICD-10-CM

## 2024-09-09 PROCEDURE — 97110 THERAPEUTIC EXERCISES: CPT | Performed by: PHYSICAL THERAPIST

## 2024-09-09 NOTE — PROGRESS NOTES
Daily Note     Today's date: 2024  Patient name: Ashwin Wright  : 1975  MRN: [id number]  Referring provider: Toro Healy DO  Dx:   Encounter Diagnosis     ICD-10-CM    1. Traumatic incomplete tear of left rotator cuff, initial encounter  S46.012A       2. S/P arthroscopy of left shoulder  Z98.890                      Subjective: Pt reports that he was sore for only a few hours following last session. However, he was leaning over his railing at home fixing his flag and felt a pop in his L rib area. Feels he might have broken a rib. He reports he is not concerned with it He reports that it is mildly painful but does not report much pain with breathing. Does report that if he laughs too hard it will be more painful.       Objective: See treatment diary below      Assessment: Arrived 12 mins late, accommodated. Tolerated treatment well. Only some soreness in the L rib area with exercise today. Able to complete the rest of the treatment. Patient would benefit from continued PT      Plan: Continue per plan of care.  Progress treatment as tolerated.       Precautions:         Chronic pain disorder      Chronic pancreatitis (HCC)      Cirrhosis (HCC)  early cirrhosis    GERD (gastroesophageal reflux disease)      History of COVID-19 2022 mild s/s    Hyperlipidemia      Hypertension      Liver abscess      Liver disease      Meniscus tear  left knee work injury last assessed 2016    Pancreatitis      Pneumonia      Rotator cuff tear          Code: [id number]     POC expires Unit limit Auth Expiration date PT/OT/ST + Visit Limit?   24                                Visit/Unit Tracking  AUTH Status:  Date              Used 1 2 3             Remaining                        Manuals    Phys shld mob    TARUN gd 3         Thoracic mob                                       Neuro Re-Ed             Banded TB shld ext       GTB 2x10      Banded TB  "row       BTB 2x10 High row BTB 3x15 High row BTB 3x15    pulley                          Banded ER RTB 2x15 RTB 3x12 RTB 3x12     YTB 2x10 YTB 2x10 YTB 2x15   TRX walk outs  flex/ ext             Prone T  Incline 2# 2x10                          Ther Ex             UBE 3'/3' 3'/3' 3'/3' 3'/3' 3'/3' 3'/3' 3'/3' 3'/3' 3'/3' 3'/3'   Supine cane flex    25x 25x x25 x25      Supine cane cp    25x         Table slide flex    25x 25x Elevated x30 Elevated x30 BTB 2x15 BTB 2x15    OH press No weight 2x15 2# 3x10 3# 3x10      No weight  2x10 No weight 2x15   Db row 10# 2x15 15# 2x8 15# 2x10       10# 2x18,15   Lateral db raise  1# 2x10 2# 2x10          Table slide scap             Table slide abd             ER iso    5x10\" 5x10\" 10\"x5 10x5\"      Shld iso     Abd 5x10\" 10\"x5       Bicep curl 5# 2x12 7# 3x10 9# 2x10      5# db's 2x10    Tricep pushdown Black TB 2x12        12# 2x10    Skull crushers  5# 3x10 7# 2x8          Shoulder flex c ball squeeze      2x10       Pulley's     3' 3' 3' 3' 3'    Pt edu          TARUN   Ther Activity                                       Gait Training                                       Modalities                                                              "

## 2024-09-10 DIAGNOSIS — K59.00 CONSTIPATION, ACUTE: ICD-10-CM

## 2024-09-10 DIAGNOSIS — K86.1 CHRONIC PANCREATITIS, UNSPECIFIED PANCREATITIS TYPE (HCC): ICD-10-CM

## 2024-09-10 DIAGNOSIS — R14.0 ABDOMINAL DISTENTION: ICD-10-CM

## 2024-09-10 RX ORDER — PANCRELIPASE 36000; 180000; 114000 [USP'U]/1; [USP'U]/1; [USP'U]/1
1 CAPSULE, DELAYED RELEASE PELLETS ORAL
Qty: 270 CAPSULE | Refills: 1 | Status: SHIPPED | OUTPATIENT
Start: 2024-09-10

## 2024-09-11 ENCOUNTER — APPOINTMENT (OUTPATIENT)
Dept: PHYSICAL THERAPY | Facility: CLINIC | Age: 49
End: 2024-09-11
Payer: OTHER MISCELLANEOUS

## 2024-09-16 ENCOUNTER — OFFICE VISIT (OUTPATIENT)
Dept: PHYSICAL THERAPY | Facility: CLINIC | Age: 49
End: 2024-09-16
Payer: OTHER MISCELLANEOUS

## 2024-09-16 DIAGNOSIS — S46.012A TRAUMATIC INCOMPLETE TEAR OF LEFT ROTATOR CUFF, INITIAL ENCOUNTER: Primary | ICD-10-CM

## 2024-09-16 DIAGNOSIS — Z98.890 S/P ARTHROSCOPY OF LEFT SHOULDER: ICD-10-CM

## 2024-09-16 PROCEDURE — 97112 NEUROMUSCULAR REEDUCATION: CPT | Performed by: PHYSICAL THERAPIST

## 2024-09-16 PROCEDURE — 97110 THERAPEUTIC EXERCISES: CPT | Performed by: PHYSICAL THERAPIST

## 2024-09-16 NOTE — PROGRESS NOTES
Daily Note     Today's date: 2024  Patient name: Ashwin Wright  : 1975  MRN: [id number]  Referring provider: Toro Healy DO  Dx:   Encounter Diagnosis     ICD-10-CM    1. Traumatic incomplete tear of left rotator cuff, initial encounter  S46.012A       2. S/P arthroscopy of left shoulder  Z98.890                      Subjective: Pt reports that he was working all weekend on splitting and stacking wood for his house. His entire body is sore including his L shoulder.       Objective: See treatment diary below      Assessment: UBE for muscular endurance. Tolerated treatment well. Able to complete all listed exercises although pt did have more fatigue than normal. Pt did not report any increase in pain. Increasing strength. Patient would benefit from continued PT      Plan: Continue per plan of care.  Progress treatment as tolerated.       Precautions:         Chronic pain disorder      Chronic pancreatitis (HCC)      Cirrhosis (HCC)  early cirrhosis    GERD (gastroesophageal reflux disease)      History of COVID-19 2022 mild s/s    Hyperlipidemia      Hypertension      Liver abscess      Liver disease      Meniscus tear  left knee work injury last assessed 2016    Pancreatitis      Pneumonia      Rotator cuff tear          Code: [id number]     POC expires Unit limit Auth Expiration date PT/OT/ST + Visit Limit?   24                                Visit/Unit Tracking  AUTH Status:  Date              Used 1 2 3             Remaining                        Manuals    Phys shld mob             Thoracic mob                                       Neuro Re-Ed             Banded TB shld ext       GTB 2x10      Banded TB row       BTB 2x10 High row BTB 3x15 High row BTB 3x15    pulley                          Banded ER RTB 2x15 RTB 3x12 RTB 3x12 RTB 3x12    YTB 2x10 YTB 2x10 YTB 2x15   TRX walk outs  flex/ ext             Prone T  Incline  "2# 2x10  Incline 2# 2x10                      Ther Ex             UBE 3'/3' 3'/3' 3'/3' 3'/3' 3'/3' 3'/3' 3'/3' 3'/3' 3'/3' 3'/3'   Supine cane flex     25x x25 x25      Supine cane cp             Table slide flex     25x Elevated x30 Elevated x30 BTB 2x15 BTB 2x15    OH press No weight 2x15 2# 3x10 3# 3x10 3# 3x10     No weight  2x10 No weight 2x15   Db row 10# 2x15 15# 2x8 15# 2x10 15# 2x10      10# 2x18,15   Lateral db raise  1# 2x10 2# 2x10 2# 3x10         Seated banded lat pulldown    Black TB 3x10         Table slide scap             Table slide abd             ER iso     5x10\" 10\"x5 10x5\"      Shld iso     Abd 5x10\" 10\"x5       Bicep curl 5# 2x12 7# 3x10 9# 2x10 9# 3x10     5# db's 2x10    Tricep pushdown Black TB 2x12        12# 2x10    Skull crushers  5# 3x10 7# 2x8 7# 2x10         Shoulder flex c ball squeeze      2x10       Pulley's     3' 3' 3' 3' 3'    Pt edu          TARUN   Ther Activity                                       Gait Training                                       Modalities                                                                "

## 2024-09-18 ENCOUNTER — APPOINTMENT (OUTPATIENT)
Dept: PHYSICAL THERAPY | Facility: CLINIC | Age: 49
End: 2024-09-18
Payer: OTHER MISCELLANEOUS

## 2024-09-23 ENCOUNTER — OFFICE VISIT (OUTPATIENT)
Dept: PHYSICAL THERAPY | Facility: CLINIC | Age: 49
End: 2024-09-23
Payer: OTHER MISCELLANEOUS

## 2024-09-23 DIAGNOSIS — S46.012A TRAUMATIC INCOMPLETE TEAR OF LEFT ROTATOR CUFF, INITIAL ENCOUNTER: Primary | ICD-10-CM

## 2024-09-23 DIAGNOSIS — Z98.890 S/P ARTHROSCOPY OF LEFT SHOULDER: ICD-10-CM

## 2024-09-23 PROCEDURE — 97110 THERAPEUTIC EXERCISES: CPT | Performed by: PHYSICAL THERAPIST

## 2024-09-25 ENCOUNTER — APPOINTMENT (OUTPATIENT)
Dept: PHYSICAL THERAPY | Facility: CLINIC | Age: 49
End: 2024-09-25
Payer: OTHER MISCELLANEOUS

## 2024-09-30 ENCOUNTER — OFFICE VISIT (OUTPATIENT)
Dept: FAMILY MEDICINE CLINIC | Facility: CLINIC | Age: 49
End: 2024-09-30
Payer: COMMERCIAL

## 2024-09-30 ENCOUNTER — OFFICE VISIT (OUTPATIENT)
Dept: OBGYN CLINIC | Facility: CLINIC | Age: 49
End: 2024-09-30
Payer: OTHER MISCELLANEOUS

## 2024-09-30 ENCOUNTER — TELEPHONE (OUTPATIENT)
Dept: OBGYN CLINIC | Facility: MEDICAL CENTER | Age: 49
End: 2024-09-30

## 2024-09-30 VITALS
BODY MASS INDEX: 26.88 KG/M2 | DIASTOLIC BLOOD PRESSURE: 85 MMHG | WEIGHT: 192 LBS | HEIGHT: 71 IN | HEART RATE: 79 BPM | SYSTOLIC BLOOD PRESSURE: 129 MMHG

## 2024-09-30 VITALS
BODY MASS INDEX: 26.94 KG/M2 | HEIGHT: 71 IN | SYSTOLIC BLOOD PRESSURE: 122 MMHG | HEART RATE: 79 BPM | DIASTOLIC BLOOD PRESSURE: 72 MMHG | WEIGHT: 192.4 LBS | OXYGEN SATURATION: 97 % | TEMPERATURE: 98.4 F

## 2024-09-30 DIAGNOSIS — I10 PRIMARY HYPERTENSION: ICD-10-CM

## 2024-09-30 DIAGNOSIS — Z79.4 CONTROLLED TYPE 2 DIABETES MELLITUS WITH HYPERGLYCEMIA, WITH LONG-TERM CURRENT USE OF INSULIN (HCC): ICD-10-CM

## 2024-09-30 DIAGNOSIS — Z98.890 S/P ARTHROSCOPY OF LEFT SHOULDER: ICD-10-CM

## 2024-09-30 DIAGNOSIS — K86.1 CHRONIC PANCREATITIS, UNSPECIFIED PANCREATITIS TYPE (HCC): ICD-10-CM

## 2024-09-30 DIAGNOSIS — E11.65 CONTROLLED TYPE 2 DIABETES MELLITUS WITH HYPERGLYCEMIA, WITH LONG-TERM CURRENT USE OF INSULIN (HCC): ICD-10-CM

## 2024-09-30 DIAGNOSIS — S46.012D TRAUMATIC INCOMPLETE TEAR OF LEFT ROTATOR CUFF, SUBSEQUENT ENCOUNTER: ICD-10-CM

## 2024-09-30 DIAGNOSIS — M24.812 INTERNAL DERANGEMENT OF LEFT SHOULDER: Primary | ICD-10-CM

## 2024-09-30 DIAGNOSIS — M25.472 LEFT ANKLE SWELLING: Primary | ICD-10-CM

## 2024-09-30 PROCEDURE — 99214 OFFICE O/P EST MOD 30 MIN: CPT | Performed by: ORTHOPAEDIC SURGERY

## 2024-09-30 PROCEDURE — 99214 OFFICE O/P EST MOD 30 MIN: CPT | Performed by: FAMILY MEDICINE

## 2024-09-30 NOTE — PROGRESS NOTES
"Ambulatory Visit  Name: Ashwin Wright      : 1975      MRN: [id number]  Encounter Provider: LUANA Mejía  Encounter Date: 2024   Encounter department: [facility] [address]    Assessment & Plan  Left ankle swelling  Recommend supportive footwear, recommend compression       Primary hypertension  Stable within parameters       Chronic pancreatitis, unspecified pancreatitis type (HCC)  Stable continue to follow-up with GI       Controlled type 2 diabetes mellitus with hyperglycemia, with long-term current use of insulin (HCC)  Stable within goal continued follow-up with GI in regard to chronic pancreatitis treatment  Lab Results   Component Value Date    HGBA1C 4.9 2023               History of Present Illness     C/o left ankle swelling   Approx 2wks   Dog 'has taken me out \" big dog , pulls   Denies any direct injury  Negative numbness tingling, negative rash lesion although does have multiple dog scratches  Negative fever chills nonhealing wound          Review of Systems   Constitutional:  Negative for appetite change, chills, fever and unexpected weight change.   HENT:  Negative for congestion, dental problem, ear pain, hearing loss, postnasal drip, rhinorrhea, sinus pressure, sinus pain, sneezing, sore throat, tinnitus and voice change.    Eyes:  Negative for visual disturbance.   Respiratory:  Negative for apnea, cough, chest tightness and shortness of breath.    Cardiovascular:  Negative for chest pain, palpitations and leg swelling.   Gastrointestinal:  Negative for abdominal pain, blood in stool, constipation, diarrhea, nausea and vomiting.   Endocrine: Negative for cold intolerance, heat intolerance, polydipsia, polyphagia and polyuria.   Genitourinary:  Negative for decreased urine volume, difficulty urinating, dysuria, frequency and hematuria.   Musculoskeletal:  Positive for joint swelling. Negative for arthralgias, back pain, gait problem " "and myalgias.   Skin:  Negative for color change, rash and wound.   Allergic/Immunologic: Negative for environmental allergies and food allergies.   Neurological:  Negative for dizziness, syncope, weakness, light-headedness, numbness and headaches.   Hematological:  Negative for adenopathy. Does not bruise/bleed easily.   Psychiatric/Behavioral:  Negative for sleep disturbance and suicidal ideas. The patient is not nervous/anxious.            Objective     /72 (BP Location: Left arm, Patient Position: Sitting)   Pulse 79   Temp 98.4 °F (36.9 °C)   Ht 5' 11\" (1.803 m)   Wt 87.3 kg (192 lb 6.4 oz)   SpO2 97%   BMI 26.83 kg/m²     Physical Exam  Constitutional:       General: He is not in acute distress.     Appearance: He is well-developed. He is not ill-appearing or toxic-appearing.   HENT:      Head: Normocephalic and atraumatic.   Cardiovascular:      Rate and Rhythm: Normal rate and regular rhythm.      Heart sounds: Normal heart sounds.   Pulmonary:      Effort: Pulmonary effort is normal.      Breath sounds: Normal breath sounds.   Abdominal:      Palpations: Abdomen is soft.   Musculoskeletal:         General: Normal range of motion.      Cervical back: Normal range of motion and neck supple.      Left lower leg: Edema present.      Comments: Mild nonpitting swelling left ankle, full range of motion, negative point tenderness    Gait steady balanced   Skin:     General: Skin is warm and dry.   Neurological:      Mental Status: He is alert and oriented to person, place, and time.      Deep Tendon Reflexes: Reflexes are normal and symmetric.   Psychiatric:         Behavior: Behavior normal.         Thought Content: Thought content normal.         Judgment: Judgment normal.         "

## 2024-09-30 NOTE — ASSESSMENT & PLAN NOTE
Stable within goal continued follow-up with GI in regard to chronic pancreatitis treatment  Lab Results   Component Value Date    HGBA1C 4.9 12/12/2023

## 2024-09-30 NOTE — TELEPHONE ENCOUNTER
Caller: Katelyn    Doctor: Niraj     Reason for call: Need OVS from 9/30 and Office Visit note 9/30  MRI left shoulder     Call back#: [phone]       Fax: [phone] FERNANDA Floyd

## 2024-09-30 NOTE — LETTER
September 30, 2024     Patient: Ashwin Wright  YOB: 1975  Date of Visit: 9/30/2024      To Whom it May Concern:    Ashwin Wright is under my professional care. Ashwin was seen in my office on 9/30/2024. Ashwin may return to work with limitations including:     No overhead work.   No lifting than greater 10lbs to waist height.  No pushing or pulling greater than 10lbs.    No climbing elevated structures or ladders..    If you have any questions or concerns, please don't hesitate to call.         Sincerely,          Toro Healy,         CC: No Recipients

## 2024-09-30 NOTE — PROGRESS NOTES
Patient Name:  Ashwin Wright  MRN:  [id number]    Assessment & Plan     1. Internal derangement of left shoulder  -     MRI shoulder left wo contrast; Future; Expected date: 09/30/2024  2. Traumatic incomplete tear of left rotator cuff, subsequent encounter  -     MRI shoulder left wo contrast; Future; Expected date: 09/30/2024  3. S/P arthroscopy of left shoulder  -     MRI shoulder left wo contrast; Future; Expected date: 09/30/2024        1 year s/p left shoulder arthroscopy rotator cuff repair, open subpectoral biceps tenodesis, acromioplasty, extensive debridement recurrent partial thickness supraspinatus tear, DOI 6/21/24  Due to recent recurrent acute worsening of symptoms after injury with weakness on exam, and difficulty with range of motion, repeat left shoulder MRI study ordered to evaluate for new  or worsened rotator cuff retear   Continue physical therapy  Continue home exercises. Additional exercises focused on range of motion and scapular mechanics demonstrated in the office today  Ice and OTC analgesics as needed for symptom management  Continue work restrictions. Work note provided today.  Follow up after MRI study for further treatment planning      History of the Present Illness   Ashwin Wright is a 49 y.o. male s/p left shoulder arthroscopy rotator cuff repair, open subpectoral biceps tenodesis, acromioplasty, extensive debridement. Today the patient reports he has been attending physical therapy once per week. About one week ago he was putting dishes away and felt a painful pop in his shoulder. Pain has been present since this event. Prior to that, he was doing very well. Pain is managed with Tylenol.         Review of Systems     Review of Systems   Constitutional:  Negative for chills and fever.   HENT:  Negative for ear pain and sore throat.    Eyes:  Negative for pain and visual disturbance.   Respiratory:  Negative for cough and shortness of breath.    Cardiovascular:  Negative for  "chest pain and palpitations.   Gastrointestinal:  Negative for abdominal pain and vomiting.   Genitourinary:  Negative for dysuria and hematuria.   Musculoskeletal:  Negative for arthralgias and back pain.   Skin:  Negative for color change and rash.   Neurological:  Negative for seizures and syncope.   All other systems reviewed and are negative.      Physical Exam     /85   Pulse 79   Ht 5' 11\" (1.803 m)   Wt 87.1 kg (192 lb)   BMI 26.78 kg/m²     Left  Shoulder:   Active range of motion   110 degrees forward flexion  110 degrees abduction  70 degrees external rotation   1 level restriction internal rotation    Passive range of motion   160 degrees forward flexion     There is no tenderness present over the shoulder  Forward flexion testing 4+/5  External rotation testing 5/5  Internal rotation testing 5/5  The patient is neurovascularly intact distally in the extremity.      Data Review     I have personally reviewed pertinent films in PACS, and my interpretation follows.    X-rays taken 6/22/2024 of Left  shoulder independently reviewed and demonstrate no acute fracture or dislocation    MRI study of the left shoulder obtained 6/26/2024 demonstrates recurrent small, partial thickness articular sided tear of supraspinatus tendon with mild retraction. No atrophy.      Social History     Tobacco Use    Smoking status: Former     Current packs/day: 0.00     Average packs/day: 1 pack/day for 20.0 years (20.0 ttl pk-yrs)     Types: Cigarettes     Start date: 3/15/2001     Quit date: 3/15/2021     Years since quitting: 3.5    Smokeless tobacco: Never   Vaping Use    Vaping status: Some Days    Substances: Nicotine   Substance Use Topics    Alcohol use: Yes     Alcohol/week: 1.0 standard drink of alcohol     Types: 1 Cans of beer per week     Comment: on weekends    Drug use: No       Procedures  None performed.      Scribe Attestation      I,:  Elena Aparicio am acting as a scribe while in the presence of " the attending physician.:       I,:  Toro Healy, DO personally performed the services described in this documentation    as scribed in my presence.:

## 2024-10-02 ENCOUNTER — TELEPHONE (OUTPATIENT)
Dept: OBGYN CLINIC | Facility: HOSPITAL | Age: 49
End: 2024-10-02

## 2024-10-02 NOTE — TELEPHONE ENCOUNTER
Caller: [facility]    Doctor/Office: Alvaro    #: [phone] ext [phone]      What needs to be faxed: MRI Script- left shoulder    ATTN to: Sonya    Fax#: [phone]      Please fax

## 2024-10-07 ENCOUNTER — APPOINTMENT (OUTPATIENT)
Dept: PHYSICAL THERAPY | Facility: CLINIC | Age: 49
End: 2024-10-07
Payer: OTHER MISCELLANEOUS

## 2024-10-09 ENCOUNTER — APPOINTMENT (OUTPATIENT)
Dept: PHYSICAL THERAPY | Facility: CLINIC | Age: 49
End: 2024-10-09
Payer: OTHER MISCELLANEOUS

## 2024-10-18 ENCOUNTER — OFFICE VISIT (OUTPATIENT)
Dept: OBGYN CLINIC | Facility: CLINIC | Age: 49
End: 2024-10-18
Payer: OTHER MISCELLANEOUS

## 2024-10-18 VITALS
WEIGHT: 192.4 LBS | HEIGHT: 71 IN | BODY MASS INDEX: 26.94 KG/M2 | HEART RATE: 71 BPM | DIASTOLIC BLOOD PRESSURE: 84 MMHG | SYSTOLIC BLOOD PRESSURE: 133 MMHG

## 2024-10-18 DIAGNOSIS — Z98.890 S/P ARTHROSCOPY OF LEFT SHOULDER: ICD-10-CM

## 2024-10-18 DIAGNOSIS — M75.112 NONTRAUMATIC INCOMPLETE TEAR OF LEFT ROTATOR CUFF: Primary | ICD-10-CM

## 2024-10-18 DIAGNOSIS — M25.512 LEFT SHOULDER PAIN, UNSPECIFIED CHRONICITY: ICD-10-CM

## 2024-10-18 PROCEDURE — 20610 DRAIN/INJ JOINT/BURSA W/O US: CPT | Performed by: ORTHOPAEDIC SURGERY

## 2024-10-18 PROCEDURE — 99214 OFFICE O/P EST MOD 30 MIN: CPT | Performed by: ORTHOPAEDIC SURGERY

## 2024-10-18 RX ORDER — BUPIVACAINE HYDROCHLORIDE 2.5 MG/ML
2 INJECTION, SOLUTION INFILTRATION; PERINEURAL
Status: COMPLETED | OUTPATIENT
Start: 2024-10-18 | End: 2024-10-18

## 2024-10-18 RX ORDER — LIDOCAINE HYDROCHLORIDE 10 MG/ML
2 INJECTION, SOLUTION INFILTRATION; PERINEURAL
Status: COMPLETED | OUTPATIENT
Start: 2024-10-18 | End: 2024-10-18

## 2024-10-18 RX ORDER — METHYLPREDNISOLONE ACETATE 40 MG/ML
2 INJECTION, SUSPENSION INTRA-ARTICULAR; INTRALESIONAL; INTRAMUSCULAR; SOFT TISSUE
Status: COMPLETED | OUTPATIENT
Start: 2024-10-18 | End: 2024-10-18

## 2024-10-18 RX ADMIN — BUPIVACAINE HYDROCHLORIDE 2 ML: 2.5 INJECTION, SOLUTION INFILTRATION; PERINEURAL at 09:00

## 2024-10-18 RX ADMIN — METHYLPREDNISOLONE ACETATE 2 ML: 40 INJECTION, SUSPENSION INTRA-ARTICULAR; INTRALESIONAL; INTRAMUSCULAR; SOFT TISSUE at 09:00

## 2024-10-18 RX ADMIN — LIDOCAINE HYDROCHLORIDE 2 ML: 10 INJECTION, SOLUTION INFILTRATION; PERINEURAL at 09:00

## 2024-10-18 NOTE — LETTER
October 18, 2024     Patient: Ashwin Wright  YOB: 1975  Date of Visit: 10/18/2024      To Whom it May Concern:    Ashwin Wright is under my professional care. Ashwin was seen in my office on 10/18/2024. Ashwin may return to work with limitations including:     No overhead work.   No lifting than greater 10lbs to waist height.  No pushing or pulling greater than 10lbs.    No climbing elevated structures or ladders..    If you have any questions or concerns, please don't hesitate to call.         Sincerely,          Toro Healy,         CC: No Recipients

## 2024-10-18 NOTE — PROGRESS NOTES
Patient Name:  Ashwin Wright  MRN:  [id number]    Assessment & Plan     1. Nontraumatic incomplete tear of left rotator cuff  -     Ambulatory Referral to Physical Therapy; Future  -     Large joint arthrocentesis: L subacromial bursa  2. S/P arthroscopy of left shoulder  -     Ambulatory Referral to Physical Therapy; Future  3. Left shoulder pain, unspecified chronicity      Left shoulder partial-thickness recurrent tear supraspinatus  MRI of the left shoulder reviewed demonstrated a partial thickness articular sided tear at the supraspinatus without interval retraction when compared to prior MRI  Continuation of nonoperative care with a steroid injection and physical therapy  Surgical intervention also discussed in the form of a shoulder arthroscopy with rotator cuff repair versus augmentation  Kirill elected nonoperative care with physical therapy and subacromial steroid injection  Tolerated injection well without complication  Current work restrictions will be extended note facilitated for him today  Follow up 6 to 8 weeks for repeat clinical evaluation.    Chief Complaint     Left shoulder pain    History of the Present Illness     Ashwin Wright is a 49 y.o. male with left shoulder pain.  He is 16 months status post left shoulder arthroscopy with rotator cuff repair open subpectoral biceps tenodesis acromioplasty and extensive debridement.  Date of surgery June 9, 2023. This is a Workmen's Compensation case.  He states that his shoulder is quite painful today and is unable to reach overhead without significant difficulty.  He describes his pain as moderate to severe aching and sharp pain.  It is diffuse about the anterior lateral aspect of his shoulder.  His recurrent pain after placing dishware in in an overhead cabinet approximately 3 weeks ago.  Pain is typically better while rest.  He denies any distal paresthesias today.  He did have an MRI of the left shoulder completed to be reviewed today.    Review  "of Systems     Review of Systems   Constitutional:  Negative for chills, fever and unexpected weight change.   HENT:  Negative for hearing loss, nosebleeds and sore throat.    Eyes:  Negative for pain, redness and visual disturbance.   Respiratory:  Negative for cough, shortness of breath and wheezing.    Cardiovascular:  Negative for chest pain, palpitations and leg swelling.   Gastrointestinal:  Negative for abdominal pain, nausea and vomiting.   Endocrine: Negative for polyphagia and polyuria.   Genitourinary:  Negative for dysuria and hematuria.   Musculoskeletal:  Positive for arthralgias and myalgias.        See HPI   Skin:  Negative for rash and wound.   Neurological:  Negative for dizziness, numbness and headaches.   Psychiatric/Behavioral:  Negative for decreased concentration and suicidal ideas. The patient is not nervous/anxious.        Physical Exam     /84   Pulse 71   Ht 5' 11\" (1.803 m)   Wt 87.3 kg (192 lb 6.4 oz)   BMI 26.83 kg/m²     Left  Shoulder:   Active range of motion   90 degrees forward flexion  80 degrees abduction  40 degrees external rotation   1 level restriction internal rotation    Passive range of motion   150 degrees of forward flexion   There is no tenderness   Forward flexion testing 4/5  External rotation testing 5/5  Internal rotation testing 5/5  Vitale test is negative   [place]'s test is negative    Speed's test is Negative  The patient is neurovascularly intact distally in the extremity.      Eyes:  Anicteric sclerae.  Neck:  Supple.  Lungs:  Normal respiratory effort.  Cardiovascular:  Capillary refill is less than 2 seconds.  Skin:  Intact without erythema.  Neurologic:  Sensation grossly intact to light touch.  Psychiatric:  Mood and affect are appropriate.    Data Review     I have personally reviewed pertinent films in PACS, and my interpretation follows:    MRI left shoulder demonstrates a articular sided partial thickness supraspinatus tear at the anterior " insertion.  No significant change when compared to prior MRI.    Past Medical History:   Diagnosis Date    Arthritis     Asthma     Chronic pain disorder     Chronic pancreatitis (HCC)     Cirrhosis (HCC)     early cirrhosis    GERD (gastroesophageal reflux disease)     History of COVID-19 01/2022    mild s/s    Hyperlipidemia     Hypertension     Liver abscess     Liver disease     Meniscus tear     left knee work injury last assessed 08/24/2016    Pancreatitis     Pneumonia     Rotator cuff tear     Stomach disorder     Chronic gastritus       Past Surgical History:   Procedure Laterality Date    CELIAC PLEXUS BLOCK Bilateral 10/29/2018    Procedure: SPLANCHNIC NERVE BLOCK;  Surgeon: Ramiro Chinchilla MD;  [facility];  Service: Pain Management     CELIAC PLEXUS BLOCK Bilateral 01/10/2019    Procedure: SPLANCHNIC NERVE BLOCK;  Surgeon: Ramiro Chinchilla MD;  [facility];  Service: Pain Management     CHOLECYSTECTOMY      COLONOSCOPY      ESOPHAGOGASTRODUODENOSCOPY N/A 11/16/2017    Procedure: ESOPHAGOGASTRODUODENOSCOPY (EGD);  Surgeon: Ever Bonner MD;  [facility]: [facility];  Service: Gastroenterology    ESOPHAGOGASTRODUODENOSCOPY N/A 04/19/2018    Procedure: ESOPHAGOGASTRODUODENOSCOPY (EGD);  Surgeon: Orestes Forrest MD;  [facility];  Service: Gastroenterology    ESOPHAGOGASTRODUODENOSCOPY N/A 05/10/2018    Procedure: ESOPHAGOGASTRODUODENOSCOPY (EGD);  Surgeon: Vaibhav Matthew MD;  [facility];  Service: Gastroenterology    HERNIA REPAIR Bilateral 02/03/2023    Procedure: REPAIR HERNIA INGUINAL, LAPAROSCOPIC,;  Surgeon: Juanjo Travis DO;  [facility];  Service: General    IR TEMPORARY DIALYSIS CATHETER PLACEMENT  02/28/2019    KNEE ARTHROSCOPY Bilateral     KNEE ARTHROSCOPY Right 2009    cibishino last assessed 08/24/2016    KNEE ARTHROSCOPY Right 07/01/2019    LIVER SURGERY      NERVE BLOCK Bilateral 05/29/2018    Procedure: SPLANCHNIC NERVE BLOCK;  Surgeon: Ramiro  MD Renée;  [facility];  Service: Pain Management     PANCREAS SURGERY      stents    PANCREATIC CYST EXCISION      TN INJECTION ANES LMBR/THRC PARAVERTBRL SYMPATHETIC Bilateral 12/28/2017    Procedure: SPLANCHNIC NERVE BLOCK;  Surgeon: Ramiro Chinchilla MD;  [facility];  Service: Pain Management     TN INJX ANES CELIAC PLEXUS W/WO RADIOLOGIC MONITRNG Bilateral 05/09/2017    Procedure: CELIAC PLEXUS BLOCK ;  Surgeon: Rmairo Chinchilla MD;  [facility];  Service: Pain Management     TN INJX ANES CELIAC PLEXUS W/WO RADIOLOGIC MONITRNG Bilateral 06/01/2017    Procedure: SPLANCHNIC NERVE BLOCK at T12;  Surgeon: Ramiro Chinchilla MD;  [facility];  Service: Pain Management     TN INJX ANES CELIAC PLEXUS W/WO RADIOLOGIC MONITRNG Bilateral 08/08/2017    Procedure: BILATERAL SPLANCHNIC NERVE BLOCK T12;  Surgeon: Ramiro Chinchilla MD;  [facility];  Service: Pain Management     TN INJX ANES CELIAC PLEXUS W/WO RADIOLOGIC MONITRNG Bilateral 04/18/2019    Procedure: BLOCK / INJECTION CELIAC PLEXUS;  Surgeon: Ramiro Chinchilla MD;  [facility];  Service: Pain Management     TN INJX ANES CELIAC PLEXUS W/WO RADIOLOGIC MONITRNG Bilateral 08/20/2019    Procedure: SPLANCHNIC NERVE BLOCK;  Surgeon: Ramiro Chinchilla MD;  [facility];  Service: Pain Management     TN INJX ANES CELIAC PLEXUS W/WO RADIOLOGIC MONITRNG Bilateral 10/17/2019    Procedure: SPLANCHNIC NERVE BLOCK;  Surgeon: Ramiro Chinchilla MD;  [facility];  Service: Pain Management     TN LAPAROSCOPY SURG CHOLECYSTECTOMY N/A 02/14/2017    Procedure: LAPAROSCOPIC CHOLECYSTECTOMY, IOC, POSSIBLE OPEN.;  Surgeon: Suresh Lang MD;  [facility];  Service: General    TN SURGICAL ARTHROSCOPY SHOULDER W/ROTATOR CUFF RPR Left 06/09/2023    Procedure: Left Shoulder Arthroscopic Rotator Cuff Repair, Open Subpectoral Biceps Tenodesis, Acromioplasty, Extensive Debridement;  Surgeon: Toro Healy DO;  [facility];  Service: Orthopedics    ROTATOR CUFF REPAIR Right     SHOULDER ARTHROSCOPY      SHOULDER ARTHROSCOPY Right     SHOULDER SURGERY      TENDON REPAIR  2015    Right shoulder       Allergies   Allergen Reactions    Bee Venom Swelling       Current Outpatient Medications on File Prior to Visit   Medication Sig Dispense Refill    albuterol (PROVENTIL HFA,VENTOLIN HFA) 90 mcg/act inhaler INHALE TWO PUFFS BY MOUTH EVERY 6 HOURS AS NEEDED FOR WHEEZING 54 g 0    amLODIPine (NORVASC) 5 mg tablet Take 1 tablet (5 mg total) by mouth daily 90 tablet 1    Choline Fenofibrate (Fenofibric Acid) 135 MG CPDR TAKE ONE CAPSULE BY MOUTH EVERY DAY 90 capsule 1    Empagliflozin 10 MG TABS Take 10 mg by mouth every morning      esomeprazole (NexIUM) 40 MG capsule Take 1 capsule (40 mg total) by mouth 2 (two) times a day before meals 60 capsule 5    famotidine (PEPCID) 40 MG tablet Take 40 mg by mouth daily at bedtime as needed       fluticasone (FLONASE) 50 mcg/act nasal spray 1 spray into each nostril daily 9 mL 1    insulin degludec (Tresiba FlexTouch) 100 units/mL injection pen 20 units subcut in hs      Insulin Pen Needle (Pen Needles) 32G X 5 MM MISC use once daily as directed      lisinopril (ZESTRIL) 20 mg tablet TAKE ONE TABLET BY MOUTH EVERY DAY 90 tablet 1    Multiple Vitamins-Minerals (MULTIVITAMIN ADULTS PO) Take by mouth daily after breakfast      NON FORMULARY Pantessin 1 capsule daily      ondansetron (ZOFRAN-ODT) 4 mg disintegrating tablet Take 1 tablet (4 mg total) by mouth every 8 (eight) hours as needed for nausea or vomiting 30 tablet 0    Pancrelipase, Lip-Prot-Amyl, (Creon) 91602-191501 units CPEP TAKE 1 CAPSULE (36,000 UNITS TOTAL) BY MOUTH 3 (THREE) TIMES A DAY BEFORE MEALS 270 capsule 1    propranolol (INDERAL) 20 mg tablet TAKE 1 TABLET BY MOUTH TWICE DAILY 180 tablet 1    rosuvastatin (CRESTOR) 40 MG tablet TAKE ONE TABLET BY MOUTH EVERY DAY 90 tablet 1    traZODone (DESYREL) 50 mg tablet TAKE ONE TABLET BY  "MOUTH AT BEDTIME 30 tablet 5    TURMERIC PO Take by mouth in the morning      omega-3-acid ethyl esters (LOVAZA) 1 g capsule TAKE TWO CAPSULES BY MOUTH TWICE DAILY (Patient not taking: Reported on 8/19/2024) 360 capsule 0    pancrelipase, Lip-Prot-Amyl, (Creon) 12,000 units capsule Take 12,000 units of lipase by mouth 3 (three) times a day as needed for snacks 270 capsule 1     No current facility-administered medications on file prior to visit.       Social History     Tobacco Use    Smoking status: Former     Current packs/day: 0.00     Average packs/day: 1 pack/day for 20.0 years (20.0 ttl pk-yrs)     Types: Cigarettes     Start date: 3/15/2001     Quit date: 3/15/2021     Years since quitting: 3.5    Smokeless tobacco: Never   Vaping Use    Vaping status: Some Days    Substances: Nicotine   Substance Use Topics    Alcohol use: Yes     Alcohol/week: 1.0 standard drink of alcohol     Types: 1 Cans of beer per week     Comment: on weekends    Drug use: No       Family History   Problem Relation Age of Onset    Cirrhosis Mother     Heart disease Other         cardiac disorder    Cancer Other              Procedures Performed     Large joint arthrocentesis: L subacromial bursa  [place] Protocol:  procedure performed by consultantConsent: Verbal consent obtained.  Risks and benefits: risks, benefits and alternatives were discussed  Consent given by: patient  Time out: Immediately prior to procedure a \"time out\" was called to verify the correct patient, procedure, equipment, support staff and site/side marked as required.  Timeout called at: 10/18/2024 10:04 AM.  Patient understanding: patient states understanding of the procedure being performed  Radiology Images displayed and confirmed. If images not available, report reviewed: imaging studies available  Required items: required blood products, implants, devices, and special equipment available  Patient identity confirmed: verbally with patient  Supporting " Documentation  Indications: pain   Procedure Details  Location: shoulder - L subacromial bursa  Preparation: Patient was prepped and draped in the usual sterile fashion  Needle size: 22 G  Ultrasound guidance: no  Approach: posterolateral  Medications administered: 2 mL bupivacaine 0.25 %; 2 mL lidocaine 1 %; 2 mL methylPREDNISolone acetate 40 mg/mL    Patient tolerance: patient tolerated the procedure well with no immediate complications  Dressing:  Sterile dressing applied          Scribe Attestation      I,:  Dakota Hagen am acting as a scribe while in the presence of the attending physician.:       I,:  Toro Healy DO personally performed the services described in this documentation    as scribed in my presence.:

## 2024-10-24 ENCOUNTER — EVALUATION (OUTPATIENT)
Dept: PHYSICAL THERAPY | Facility: CLINIC | Age: 49
End: 2024-10-24
Payer: OTHER MISCELLANEOUS

## 2024-10-24 DIAGNOSIS — M75.112 NONTRAUMATIC INCOMPLETE TEAR OF LEFT ROTATOR CUFF: ICD-10-CM

## 2024-10-24 DIAGNOSIS — Z98.890 S/P ARTHROSCOPY OF LEFT SHOULDER: ICD-10-CM

## 2024-10-24 PROCEDURE — 97110 THERAPEUTIC EXERCISES: CPT | Performed by: PHYSICAL THERAPIST

## 2024-10-24 PROCEDURE — 97164 PT RE-EVAL EST PLAN CARE: CPT | Performed by: PHYSICAL THERAPIST

## 2024-10-24 NOTE — PROGRESS NOTES
PT Re-evaluation     Today's date: 10/24/2024  Patient name: Ashwin Wright  : 1975  MRN: [id number]  Referring provider: Toro Healy DO  Dx:   Encounter Diagnosis     ICD-10-CM    1. S/P arthroscopy of left shoulder  Z98.890 Ambulatory Referral to Physical Therapy      2. Traumatic incomplete tear of left rotator cuff, initial encounter  S46.012A Ambulatory Referral to Physical Therapy                     Assessment  Impairments: abnormal or restricted ROM, activity intolerance, impaired physical strength and pain with function    Assessment details: Pt is a 48 y/o male who presents to physical therapy with primary nociceptive pain associated with L rotator cuff related shoulder pain in the environment of reduced thoracic mobility complicated by significant history of multiple shoulder injuries and surgeries, poor nutrition with a number of GI related issues, and liver disease. Pt had CSI on 10/18/24. Improvement in pain but still has limited ROM and strength. Pt would benefit from skilled physical therapy in order to decrease deficits and return to prior level of function.     Understanding of Dx/Px/POC: good    Goals  STG (4 weeks): - NOT MET  Pt will be independent with HEP.  Pt will demonstrate increase in flexion ROM by 5-10d.  Pt will demonstrate increase in MMT grade by 1/3 for ER.    LTG (8 weeks): - NOT MET  FOTO will be expected outcome.   Pt will demonstrate painfree flexion ROM comparable to contralateral limb.  Pt will demonstrate MMT grade comparable to contralateral limb in all deficient muscle groups.      Plan: d/c      Subjective Evaluation    History of Present Illness  Mechanism of injury: Chief Complaint: Pt reports that on 24 while pulling his truck door down it got stuck. He forcefully pulled it down and it did not move. This put a lot of pressure on his shoulder and he had pain immediately. Pt reports that since this has happened he has had significant pain in the  shoulder when he goes to move it. MRI revealed a recurrent small, partial thickness articular sided tear of supraspinatus tendon fibers at the footprint with mild retraction. He also reports pain in the lateral arm at times. Some bruising as well.    (9/23): Pt reports he was doing well. However, on Saturday, he reports lifting 4 pyrex dishes overhead to put them away and felt a pop in his shoulder. He reports having excruciating pain in the shoulder following and it continues until today. States he can't lay on that side now. Reports that he hasn't been able to do much with his shoulder since.     (10/24): Pt went to [facility]. MRI showed tear of supraspinatus. CSI given. Improved pain and ROM. Still limited ROM and unable to tolerate much activity.     Severity: severe  Irritability: moderate  Nature: nociceptive  Stage: subacute  Stability: no change    P1: see body chart  Patient Goals  Patient goals for therapy: decreased pain and return to work        Objective     Observations     Additional Observation Details  Bruising on the L lateral arm, no evidence of change in muscle bulk of the upper arm  Bruising of the superior L shld, tender to the touch, pt unsure of how this got there- he did report he had been doing belly flops in the pool and had gotten a number of bruises on his abdomen from this- these were visualized    Cervical/Thoracic Screen   Cervical range of motion within normal limits  Cervical range of motion within normal limits with the following exceptions: No recreation of pain    Active Range of Motion   Left Shoulder   Flexion: 105 degrees with pain  Abduction: 105 degrees with pain  External rotation 0°: WFL and with pain    Right Shoulder   Normal active range of motion    Passive Range of Motion   Left Shoulder   Flexion: 105 degrees with pain  Abduction: 90d  External rotation 0°: WFL and with pain    Additional Passive Range of Motion Details  Empty end feel with all PROM and muscular guarding  today    Joint Play   Left Shoulder  Joints within functional limits are the posterior capsule.     Additional Joint Play Details  Hypomobile T1-3    Strength/Myotome Testing     Shld:  Flex:  Abd:  ER: 5/5 R; 3+/5 L  IR: 5/5 R; 3+/5    Pain limited for all MMT             Precautions:         Chronic pain disorder      Chronic pancreatitis (HCC)      Cirrhosis (HCC)  early cirrhosis    GERD (gastroesophageal reflux disease)      History of COVID-19 01/2022 mild s/s    Hyperlipidemia      Hypertension      Liver abscess      Liver disease      Meniscus tear  left knee work injury last assessed 08/24/2016    Pancreatitis      Pneumonia      Rotator cuff tear          POC expires Unit limit Auth Expiration date PT/OT/ST + Visit Limit?   9/16/24                              Visit/Unit Tracking  AUTH Status:  Date 7/22               Used 1               Remaining                    Manuals 10/24            Phys shld mob             Thoracic mob                                       Neuro Re-Ed             Banded TB shld ext             Banded TB row             pulley                          Banded ER             TRX walk outs  flex/ ext             Prone T                          Ther Ex             UBE             Supine cane flex 20x            Wall slide 10x            Supine cane cp             PROM             Table slide flex             OH press             Db row             Lateral db raise             Seated banded lat pulldown             Table slide scap             Table slide abd             ER iso             Shld iso             Bicep curl             Tricep pushdown             Skull crushers             Shoulder flex c ball squeeze             Debby's 20x            Pt edu             Ther Activity                                       Gait Training                                       Modalities

## 2024-10-29 DIAGNOSIS — G47.00 INSOMNIA, UNSPECIFIED TYPE: ICD-10-CM

## 2024-10-30 RX ORDER — ONDANSETRON 4 MG/1
4 TABLET, ORALLY DISINTEGRATING ORAL EVERY 8 HOURS PRN
Qty: 30 TABLET | Refills: 0 | Status: SHIPPED | OUTPATIENT
Start: 2024-10-30

## 2024-10-31 ENCOUNTER — OFFICE VISIT (OUTPATIENT)
Dept: NEUROLOGY | Facility: CLINIC | Age: 49
End: 2024-10-31
Payer: COMMERCIAL

## 2024-10-31 VITALS
OXYGEN SATURATION: 94 % | HEART RATE: 67 BPM | SYSTOLIC BLOOD PRESSURE: 110 MMHG | HEIGHT: 71 IN | DIASTOLIC BLOOD PRESSURE: 58 MMHG | BODY MASS INDEX: 26.83 KG/M2

## 2024-10-31 DIAGNOSIS — R41.3 MEMORY DIFFICULTIES: ICD-10-CM

## 2024-10-31 DIAGNOSIS — G25.0 TREMOR, ESSENTIAL: Primary | ICD-10-CM

## 2024-10-31 PROCEDURE — 99215 OFFICE O/P EST HI 40 MIN: CPT | Performed by: PSYCHIATRY & NEUROLOGY

## 2024-10-31 RX ORDER — PROPRANOLOL HCL 20 MG
TABLET ORAL
Qty: 180 TABLET | Refills: 1 | Status: SHIPPED | OUTPATIENT
Start: 2024-10-31 | End: 2024-11-05 | Stop reason: SDUPTHER

## 2024-10-31 NOTE — PROGRESS NOTES
Neurology Ambulatory Visit  Name: Ashwin Wright       : 1975       MRN: [id number]   Encounter Provider: Trino Calixto MD   Encounter Date: 10/31/2024  Encounter department: [facility]      Ashwin Wright is a 49 y.o. male.       Assessment & Plan  Tremor, essential    Orders:    propranolol (INDERAL) 20 mg tablet; TAKE 1 TABLET BY MOUTH TWICE DAILY  Loren and Dr. Lee's prior notes were reviewed ,most likely essential tremor currently stable on Inderal 20 mg 1 tablet twice a day advised to continue with same to keep his blood pressure cholesterol sugar under control to take fall and safety precautions  Memory difficulties    Orders:    MRI brain NeuroQuant wo contrast; Future    Ambulatory Referral to Occupational Therapy; Future    Ambulatory referral to Neuropsychology; Future    Folate; Future    Lyme Total AB W Reflex to IGM/IGG; Future    CBC and differential; Future    Comprehensive metabolic panel; Future    TSH, 3rd generation with Free T4 reflex; Future    Vitamin B12; Future    EEG Routine and awake; Future    Patient does have cognitive impairment I am not sure if his hearing is causing some of his issues versus a true cognitive impairment would recommend an MRI of the brain with neuro quant, EEG blood work and a detailed neuropsych testing patient to call me after the test to discuss the results he was advised to go for cognitive therapy also was advised to follow-up with an ENT surgeon regarding his hearing issues and with his family physician, he was strongly advised not to drive till we figure out regarding his memory and his hearing issues patient is agreeable, to take fall and safety precautions to take a multivitamin he was advised to completely go off the alcohol.  To go to the hospital if has any worsening symptoms and call me otherwise to see me back in 3 to 4 months or sooner if needed and follow-up with the other  physicians    Subjective:  Chief Complaint   Patient presents with    Tremors       HISTORY OF PRESENT ILLNESS    49-year-old male with past medical history of essential tremor who was seeing my associate Loren and prior to that saw Dr. 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  He is able to manage his finances lately he went for shooting and did not have his headphones and has had some issues with his hearing and is in follow-up with an ENT specialist.  No history of seizures no syncopal episodes.  No family history of dementia at a young age he drinks alcohol only on Sundays less than 6 pack beer according to the patient.  Appetite is good, weight has been stable no bowel and bladder incontinence no swallowing difficulty no headaches no dizziness no other complaints.        Prior Work-up:   MRI: Brain in 2022 was reported as unremarkable for any acute pathology       Past Medical History:    Past Medical History:   Diagnosis Date    Arthritis     Asthma     Chronic pain disorder     Chronic pancreatitis (HCC)     Cirrhosis (HCC)     early cirrhosis    GERD (gastroesophageal reflux disease)     History of COVID-19 01/2022    mild s/s    Hyperlipidemia     Hypertension     Liver abscess     Liver disease      Meniscus tear     left knee work injury last assessed 08/24/2016    Pancreatitis     Pneumonia     Rotator cuff tear     Stomach disorder     Chronic gastritus     Past Surgical History:   Procedure Laterality Date    CELIAC PLEXUS BLOCK Bilateral 10/29/2018    Procedure: SPLANCHNIC NERVE BLOCK;  Surgeon: Ramiro Chinchilla MD;  [facility];  Service: Pain Management     CELIAC PLEXUS BLOCK Bilateral 01/10/2019    Procedure: SPLANCHNIC NERVE BLOCK;  Surgeon: Ramiro Chinchilla MD;  [facility];  Service: Pain Management     CHOLECYSTECTOMY      COLONOSCOPY      ESOPHAGOGASTRODUODENOSCOPY N/A 11/16/2017    Procedure: ESOPHAGOGASTRODUODENOSCOPY (EGD);  Surgeon: Ever Bonner MD;  [facility];  Service: Gastroenterology    ESOPHAGOGASTRODUODENOSCOPY N/A 04/19/2018    Procedure: ESOPHAGOGASTRODUODENOSCOPY (EGD);  Surgeon: Orestes Forrest MD;  [facility];  Service: Gastroenterology    ESOPHAGOGASTRODUODENOSCOPY N/A 05/10/2018    Procedure: ESOPHAGOGASTRODUODENOSCOPY (EGD);  Surgeon: Vaibhav Matthew MD;  [facility];  Service: Gastroenterology    HERNIA REPAIR Bilateral 02/03/2023    Procedure: REPAIR HERNIA INGUINAL, LAPAROSCOPIC,;  Surgeon: Juanjo Travis DO;  [facility];  Service: General    IR TEMPORARY DIALYSIS CATHETER PLACEMENT  02/28/2019    KNEE ARTHROSCOPY Bilateral     KNEE ARTHROSCOPY Right 2009    cibishino last assessed 08/24/2016    KNEE ARTHROSCOPY Right 07/01/2019    LIVER SURGERY      NERVE BLOCK Bilateral 05/29/2018    Procedure: SPLANCHNIC NERVE BLOCK;  Surgeon: Ramiro Chinchilla MD;  [facility];  Service: Pain Management     PANCREAS SURGERY      stents    PANCREATIC CYST EXCISION      MT INJECTION ANES LMBR/THRC PARAVERTBRL SYMPATHETIC Bilateral 12/28/2017    Procedure: SPLANCHNIC NERVE BLOCK;  Surgeon: Ramiro Chinchilla MD;  [facility];  Service: Pain Management     MT INJX ANES CELIAC PLEXUS W/WO RADIOLOGIC MONITRNG Bilateral  05/09/2017    Procedure: CELIAC PLEXUS BLOCK ;  Surgeon: Ramiro Chinchilla MD;  [facility];  Service: Pain Management     IN INJX ANES CELIAC PLEXUS W/WO RADIOLOGIC MONITRNG Bilateral 06/01/2017    Procedure: SPLANCHNIC NERVE BLOCK at T12;  Surgeon: Ramiro Chinchilla MD;  [facility];  Service: Pain Management     IN INJX ANES CELIAC PLEXUS W/WO RADIOLOGIC MONITRNG Bilateral 08/08/2017    Procedure: BILATERAL SPLANCHNIC NERVE BLOCK T12;  Surgeon: Ramiro Chinchilla MD;  [facility];  Service: Pain Management     IN INJX ANES CELIAC PLEXUS W/WO RADIOLOGIC MONITRNG Bilateral 04/18/2019    Procedure: BLOCK / INJECTION CELIAC PLEXUS;  Surgeon: Ramiro Chinchilla MD;  [facility];  Service: Pain Management     IN INJX ANES CELIAC PLEXUS W/WO RADIOLOGIC MONITRNG Bilateral 08/20/2019    Procedure: SPLANCHNIC NERVE BLOCK;  Surgeon: Ramiro Chinchilla MD;  [facility];  Service: Pain Management     IN INJX ANES CELIAC PLEXUS W/WO RADIOLOGIC MONITRNG Bilateral 10/17/2019    Procedure: SPLANCHNIC NERVE BLOCK;  Surgeon: Ramiro Chinchilla MD;  [facility];  Service: Pain Management     IN LAPAROSCOPY SURG CHOLECYSTECTOMY N/A 02/14/2017    Procedure: LAPAROSCOPIC CHOLECYSTECTOMY, IOC, POSSIBLE OPEN.;  Surgeon: Suresh Lang MD;  [facility];  Service: General    IN SURGICAL ARTHROSCOPY SHOULDER W/ROTATOR CUFF RPR Left 06/09/2023    Procedure: Left Shoulder Arthroscopic Rotator Cuff Repair, Open Subpectoral Biceps Tenodesis, Acromioplasty, Extensive Debridement;  Surgeon: Toro Healy DO;  [facility];  Service: Orthopedics    ROTATOR CUFF REPAIR Right     SHOULDER ARTHROSCOPY      SHOULDER ARTHROSCOPY Right     SHOULDER SURGERY      TENDON REPAIR  2015    Right shoulder       Family History:  Family History   Problem Relation Age of Onset    Cirrhosis Mother     Heart disease Other         cardiac disorder    Cancer Other        Social History:  Social History     Tobacco  Use    Smoking status: Former     Current packs/day: 0.00     Average packs/day: 1 pack/day for 20.0 years (20.0 ttl pk-yrs)     Types: Cigarettes     Start date: 3/15/2001     Quit date: 3/15/2021     Years since quitting: 3.6    Smokeless tobacco: Never   Vaping Use    Vaping status: Some Days    Substances: Nicotine   Substance Use Topics    Alcohol use: Yes     Alcohol/week: 1.0 standard drink of alcohol     Types: 1 Cans of beer per week     Comment: on weekends    Drug use: No      Living situation:    Work:      Allergies:  Allergies   Allergen Reactions    Bee Venom Swelling       Medications:    Current Outpatient Medications:     albuterol (PROVENTIL HFA,VENTOLIN HFA) 90 mcg/act inhaler, INHALE TWO PUFFS BY MOUTH EVERY 6 HOURS AS NEEDED FOR WHEEZING, Disp: 54 g, Rfl: 0    amLODIPine (NORVASC) 5 mg tablet, Take 1 tablet (5 mg total) by mouth daily, Disp: 90 tablet, Rfl: 1    Choline Fenofibrate (Fenofibric Acid) 135 MG CPDR, TAKE ONE CAPSULE BY MOUTH EVERY DAY, Disp: 90 capsule, Rfl: 1    Empagliflozin 10 MG TABS, Take 10 mg by mouth every morning, Disp: , Rfl:     esomeprazole (NexIUM) 40 MG capsule, Take 1 capsule (40 mg total) by mouth 2 (two) times a day before meals, Disp: 60 capsule, Rfl: 5    famotidine (PEPCID) 40 MG tablet, Take 40 mg by mouth daily at bedtime as needed , Disp: , Rfl:     fluticasone (FLONASE) 50 mcg/act nasal spray, 1 spray into each nostril daily, Disp: 9 mL, Rfl: 1    insulin degludec (Tresiba FlexTouch) 100 units/mL injection pen, 20 units subcut in hs, Disp: , Rfl:     Insulin Pen Needle (Pen Needles) 32G X 5 MM MISC, use once daily as directed, Disp: , Rfl:     lisinopril (ZESTRIL) 20 mg tablet, TAKE ONE TABLET BY MOUTH EVERY DAY, Disp: 90 tablet, Rfl: 1    Multiple Vitamins-Minerals (MULTIVITAMIN ADULTS PO), Take by mouth daily after breakfast, Disp: , Rfl:     NON FORMULARY, Pantessin 1 capsule daily, Disp: , Rfl:     ondansetron (ZOFRAN-ODT) 4 mg disintegrating tablet,  "Take 1 tablet (4 mg total) by mouth every 8 (eight) hours as needed for nausea or vomiting, Disp: 30 tablet, Rfl: 0    Pancrelipase, Lip-Prot-Amyl, (Creon) 87932-764097 units CPEP, TAKE 1 CAPSULE (36,000 UNITS TOTAL) BY MOUTH 3 (THREE) TIMES A DAY BEFORE MEALS, Disp: 270 capsule, Rfl: 1    propranolol (INDERAL) 20 mg tablet, TAKE 1 TABLET BY MOUTH TWICE DAILY, Disp: 180 tablet, Rfl: 1    rosuvastatin (CRESTOR) 40 MG tablet, TAKE ONE TABLET BY MOUTH EVERY DAY, Disp: 90 tablet, Rfl: 1    traZODone (DESYREL) 50 mg tablet, TAKE ONE TABLET BY MOUTH AT BEDTIME, Disp: 30 tablet, Rfl: 5    TURMERIC PO, Take by mouth in the morning, Disp: , Rfl:     omega-3-acid ethyl esters (LOVAZA) 1 g capsule, TAKE TWO CAPSULES BY MOUTH TWICE DAILY (Patient not taking: Reported on 8/19/2024), Disp: 360 capsule, Rfl: 0    pancrelipase, Lip-Prot-Amyl, (Creon) 12,000 units capsule, Take 12,000 units of lipase by mouth 3 (three) times a day as needed for snacks, Disp: 270 capsule, Rfl: 1    Objective:  /58 (BP Location: Left arm, Patient Position: Sitting, Cuff Size: Standard)   Pulse 67   Ht 5' 11\" (1.803 m)   SpO2 94%   BMI 26.83 kg/m²      Labs  I have reviewed pertinent labs:  CBC:   Lab Results   Component Value Date    WBC 4.5 01/12/2024    RBC 3.93 (L) 01/12/2024    HGB 13.6 01/12/2024    HCT 39.4 01/12/2024    .3 (H) 01/12/2024    PLT 52 (L) 01/12/2024    MCH 34.6 (H) 01/12/2024    MCHC 34.5 01/12/2024    RDW 13.3 01/12/2024    MPV 9.9 06/02/2023    NEUTROABS 3,182 01/12/2024     CMP:   Lab Results   Component Value Date    SODIUM 139 06/19/2024    K 4.1 06/19/2024     06/19/2024    CO2 25 06/19/2024    AGAP 7 09/21/2023    BUN 13 06/19/2024    CREATININE 0.64 (L) 06/19/2024    GLUC 101 (H) 06/19/2024    GLUF 93 06/02/2023    CALCIUM 10.4 (H) 06/19/2024    AST 97 (H) 06/19/2024    ALT 45 06/19/2024    ALKPHOS 123 06/19/2024    TP 7.4 06/19/2024    ALB 4.7 06/19/2024    TBILI 0.8 06/19/2024    EGFR 115 06/19/2024 "     Thyroid Studies:   Lab Results   Component Value Date    RFF4BPAQRZNH 1.141 05/14/2020     Vitamin B12   Lab Results   Component Value Date    XZRQGDXC27 770 09/08/2019     Folate   Lab Results   Component Value Date    FOLATE >20.0 (H) 09/08/2019              General Exam  GENERAL APPEARANCE:  No distress, alert, interactive and cooperative.  CARDIOVASCULAR: Warm and well perfused  LUNGS: normal work of breathing on room air  EXTREMITIES: no peripheral edema     Neurologic Exam  MENTAL STATE:  Orientation was normal to time, place and person without aphasia or apraxia.  MoCA is 15/30    CRANIAL NERVES:  CN 2       visual fields full to confrontation.  CN 3, 4, 6  Pupils round, 4 mm in diameter, equally reactive to light. Lids symmetric; no ptosis. EOMs normal alignment, full range. No nystagmus.  CN 5  Facial sensation intact bilaterally.  CN 7  Normal and symmetric facial strength.   CN 8  Hearing is decreased to finger rub bilaterally.  CN 9  Palate elevates symmetrically.  CN 11  Normal strength of shoulder shrug and neck turning.  CN 12  Tongue midline, with normal bulk and strength.     MOTOR:  Motor exam was normal. Muscle bulk and tone were normal in both upper and lower extremities. Muscle strength was 5/5 in distal and proximal muscles in both upper and lower extremities. No fasciculations, tremor or other abnormal movements were present.  Mild tremor on outstretched hands bilaterally     REFLEXES:  RIGHT UE   LEFT UE  BR:2              BR:2    Biceps:2      Biceps:2    Triceps:2     Triceps:2       RIGHT LLE   LEFT LLE    Knee:2           Knee:2    Ankle:2         Ankle:2    Babinski: toes downgoing to plantar stimulation. No clonus.     SENSORY:  Intact to temperature and vibratory sensation in the upper and lower extremities bilaterally. Cortical function is intact.    COORDINATION:   Coordination exam was normal. In both upper extremities, finger-nose-finger was intact without dysmetria or  overshoot.      GAIT:  Gait was normal. Station was stable with a normal base. Gait was stable with a normal arm swing and speed. Tandem gait was intact. No Romberg sign was present.      ROS:  Review of Systems   Constitutional:  Negative for appetite change, fatigue and fever.   HENT: Negative.  Negative for hearing loss, tinnitus, trouble swallowing and voice change.    Eyes: Negative.  Negative for photophobia, pain and visual disturbance.   Respiratory: Negative.  Negative for shortness of breath.    Cardiovascular: Negative.  Negative for palpitations.   Gastrointestinal: Negative.  Negative for nausea and vomiting.   Endocrine: Negative.  Negative for cold intolerance.   Genitourinary: Negative.  Negative for dysuria, frequency and urgency.   Musculoskeletal:  Negative for back pain, gait problem, myalgias, neck pain and neck stiffness.   Skin: Negative.  Negative for rash.   Allergic/Immunologic: Negative.    Neurological:  Positive for tremors. Negative for dizziness, seizures, syncope, facial asymmetry, speech difficulty, weakness, light-headedness, numbness and headaches.   Hematological: Negative.  Does not bruise/bleed easily.   Psychiatric/Behavioral: Negative.  Negative for confusion, hallucinations and sleep disturbance.        I have reviewed the past medical history, surgical history, social and family history, current medications, allergies vitals, review of systems, and updated this information as appropriate today.    Administrative Statements   The total amount of time spent with the patient and on chart review and documentation was  35minutes. Issues addressed during this clinic visit included overall management, medication counseling or monitoring, and counseling and coordination of care.         Trino Calixto MD

## 2024-10-31 NOTE — ASSESSMENT & PLAN NOTE
Orders:    propranolol (INDERAL) 20 mg tablet; TAKE 1 TABLET BY MOUTH TWICE DAILY  Loren and Dr. Lee's prior notes were reviewed ,most likely essential tremor currently stable on Inderal 20 mg 1 tablet twice a day advised to continue with same to keep his blood pressure cholesterol sugar under control to take fall and safety precautions

## 2024-10-31 NOTE — ASSESSMENT & PLAN NOTE
Orders:    MRI brain NeuroQuant wo contrast; Future    Ambulatory Referral to Occupational Therapy; Future    Ambulatory referral to Neuropsychology; Future    Folate; Future    Lyme Total AB W Reflex to IGM/IGG; Future    CBC and differential; Future    Comprehensive metabolic panel; Future    TSH, 3rd generation with Free T4 reflex; Future    Vitamin B12; Future    EEG Routine and awake; Future

## 2024-11-01 ENCOUNTER — TELEPHONE (OUTPATIENT)
Age: 49
End: 2024-11-01

## 2024-11-01 NOTE — TELEPHONE ENCOUNTER
left  in regards to ROF.  will send outside resours when patient returns my call.  is going to close referral at this time due to not having a provider to complete the requested service.  left call back # [phone].

## 2024-11-04 ENCOUNTER — OFFICE VISIT (OUTPATIENT)
Dept: PHYSICAL THERAPY | Facility: CLINIC | Age: 49
End: 2024-11-04
Payer: OTHER MISCELLANEOUS

## 2024-11-04 DIAGNOSIS — M75.112 NONTRAUMATIC INCOMPLETE TEAR OF LEFT ROTATOR CUFF: ICD-10-CM

## 2024-11-04 DIAGNOSIS — Z98.890 S/P ARTHROSCOPY OF LEFT SHOULDER: Primary | ICD-10-CM

## 2024-11-04 PROCEDURE — 97110 THERAPEUTIC EXERCISES: CPT

## 2024-11-04 NOTE — PROGRESS NOTES
"Daily Note     Today's date: 2024  Patient name: Ashwin Wright  : 1975  MRN: [id number]  Referring provider: Toro Healy DO  Dx:   Encounter Diagnosis     ICD-10-CM    1. S/P arthroscopy of left shoulder  Z98.890       2. Nontraumatic incomplete tear of left rotator cuff  M75.112                      Subjective: Pt reports his L shoulder has been popping.  At times is painful.  Presents with large L medial elbow bruise.  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       Objective: See treatment diary below      Assessment:  Some scapular compensation with shoulder flexion in standing.  He experiences pain with shoulder flexion ROM.  Demonstrates increased L shoulder soreness post session.        Plan: Continue per plan of care.      Precautions:         Chronic pain disorder      Chronic pancreatitis (HCC)      Cirrhosis (HCC)  early cirrhosis    GERD (gastroesophageal reflux disease)      History of COVID-19 2022 mild s/s    Hyperlipidemia      Hypertension      Liver abscess      Liver disease      Meniscus tear  left knee work injury last assessed 2016    Pancreatitis      Pneumonia      Rotator cuff tear          Code: [id number]     POC expires Unit limit Auth Expiration date PT/OT/ST + Visit Limit?   24                                Visit/Unit Tracking  AUTH Status:  Date              Used 1 2 3             Remaining                          Manuals 10/24 11/4           Phys shld mob             Thoracic mob                                       Neuro Re-Ed             Banded TB shld ext             Banded TB row             pulley                          Banded ER             TRX walk outs  flex/ ext             Prone T                          Ther Ex             UBE             Supine cane flex 20x x20           Wall slide 10x 2x10           Supine cane cp             PROM             Table slide flex             OH press  " "           Db row             Lateral db raise             Seated banded lat pulldown             Table slide scap             Table slide abd             ER iso  5\"x20           Shld iso  Abd 5\"x20           Bicep curl             Tricep pushdown             Skull crushers             Shoulder flex c ball squeeze  2x10           S/l ER  3x10           scaption  2x10           Pulley's 20x 3'           Pt edu             Ther Activity                                       Gait Training                                       Modalities                                              "

## 2024-11-05 DIAGNOSIS — G25.0 TREMOR, ESSENTIAL: ICD-10-CM

## 2024-11-05 RX ORDER — PROPRANOLOL HCL 20 MG
TABLET ORAL
Qty: 180 TABLET | Refills: 0 | Status: SHIPPED | OUTPATIENT
Start: 2024-11-05

## 2024-11-06 ENCOUNTER — OFFICE VISIT (OUTPATIENT)
Dept: PHYSICAL THERAPY | Facility: CLINIC | Age: 49
End: 2024-11-06
Payer: OTHER MISCELLANEOUS

## 2024-11-06 DIAGNOSIS — Z98.890 S/P ARTHROSCOPY OF LEFT SHOULDER: Primary | ICD-10-CM

## 2024-11-06 DIAGNOSIS — M75.112 NONTRAUMATIC INCOMPLETE TEAR OF LEFT ROTATOR CUFF: ICD-10-CM

## 2024-11-06 PROCEDURE — 97110 THERAPEUTIC EXERCISES: CPT | Performed by: PHYSICAL THERAPIST

## 2024-11-06 PROCEDURE — 97112 NEUROMUSCULAR REEDUCATION: CPT | Performed by: PHYSICAL THERAPIST

## 2024-11-06 NOTE — PROGRESS NOTES
Daily Note     Today's date: 2024  Patient name: Ashwin Wright  : 1975  MRN: [id number]  Referring provider: Toro Healy DO  Dx:   Encounter Diagnosis     ICD-10-CM    1. S/P arthroscopy of left shoulder  Z98.890       2. Nontraumatic incomplete tear of left rotator cuff  M75.112                      Subjective: Pt reports not much pain in the shoulder since the injection, but continued weakness.       Objective: See treatment diary below      Assessment: Tolerated treatment fair. Bruise on medial L elbow continues to be the same. Able to complete all listed exercise. Patient would benefit from continued PT      Plan: Continue per plan of care.  Progress treatment as tolerated.       Precautions:         Chronic pain disorder      Chronic pancreatitis (HCC)      Cirrhosis (HCC)  early cirrhosis    GERD (gastroesophageal reflux disease)      History of COVID-19 2022 mild s/s    Hyperlipidemia      Hypertension      Liver abscess      Liver disease      Meniscus tear  left knee work injury last assessed 2016    Pancreatitis      Pneumonia      Rotator cuff tear          Code: [id number]     POC expires Unit limit Auth Expiration date PT/OT/ST + Visit Limit?   24                                Visit/Unit Tracking  AUTH Status:  Date              Used 1 2 3             Remaining                          Manuals 10/24 11/4 11/6          Phys shld mob             Thoracic mob                                       Neuro Re-Ed             Banded TB shld ext   Black TB 20x          Banded TB row   2x10 GTB          pulley   YTB 20x          Banded ER             Banded ER             TRX walk outs  flex/ ext             Prone T                          Ther Ex             UBE             Supine cane flex 20x x20 x20          Wall slide 10x 2x10           Supine cane cp             PROM             Table slide flex             OH press             Db row             Lateral db  "raise             Seated banded lat pulldown             Table slide scap             Table slide abd                          ER iso  5\"x20           Shld iso  Abd 5\"x20 Abd 5\"x20          Bicep curl             Tricep pushdown             Skull crushers             Shoulder flex c ball squeeze  2x10           S/l ER  3x10           scaption  2x10 2x10          Pulley's 20x 3'           Pt edu             Ther Activity                                       Gait Training                                       Modalities                                                   "

## 2024-11-08 NOTE — TELEPHONE ENCOUNTER
Writer left VM for patient in regards to ROF.  Writer was returning missed call from patient. Writer left call back # [phone].

## 2024-11-11 ENCOUNTER — OFFICE VISIT (OUTPATIENT)
Dept: PHYSICAL THERAPY | Facility: CLINIC | Age: 49
End: 2024-11-11
Payer: OTHER MISCELLANEOUS

## 2024-11-11 DIAGNOSIS — M75.112 NONTRAUMATIC INCOMPLETE TEAR OF LEFT ROTATOR CUFF: ICD-10-CM

## 2024-11-11 DIAGNOSIS — Z98.890 S/P ARTHROSCOPY OF LEFT SHOULDER: Primary | ICD-10-CM

## 2024-11-11 PROCEDURE — 97110 THERAPEUTIC EXERCISES: CPT | Performed by: PHYSICAL THERAPIST

## 2024-11-11 PROCEDURE — 97112 NEUROMUSCULAR REEDUCATION: CPT | Performed by: PHYSICAL THERAPIST

## 2024-11-11 NOTE — PROGRESS NOTES
Daily Note     Today's date: 2024  Patient name: Ashwin Wright  : 1975  MRN: [id number]  Referring provider: Toro Healy DO  Dx:   Encounter Diagnosis     ICD-10-CM    1. S/P arthroscopy of left shoulder  Z98.890       2. Nontraumatic incomplete tear of left rotator cuff  M75.112                      Subjective: Pt reports he feels things are getting better.       Objective: See treatment diary below      Assessment: Tolerated treatment well. Upon arrival pt able to achieve 105d shld scaption. Pt reported fatigue in the shld by the end of treatment. Patient would benefit from continued PT      Plan: Continue per plan of care.  Progress treatment as tolerated.       Precautions:         Chronic pain disorder      Chronic pancreatitis (HCC)      Cirrhosis (HCC)  early cirrhosis    GERD (gastroesophageal reflux disease)      History of COVID-19 2022 mild s/s    Hyperlipidemia      Hypertension      Liver abscess      Liver disease      Meniscus tear  left knee work injury last assessed 2016    Pancreatitis      Pneumonia      Rotator cuff tear          Code: [id number]     POC expires Unit limit Auth Expiration date PT/OT/ST + Visit Limit?   24                                Visit/Unit Tracking  AUTH Status:  Date              Used 1 2 3             Remaining                          Manuals 10/24 11/4 11/6 11/11         Phys shld mob             Thoracic mob                                       Neuro Re-Ed             Banded TB shld ext   Black TB 20x Black TB 2x15         Banded TB row   2x10 GTB BTB 2x15         pulley   YTB 20x          Banded ER             Banded ER             TRX walk outs  flex/ ext             Prone T                          Ther Ex             UBE             Supine cane flex 20x x20 x20          Wall slide 10x 2x10  20x         Supine cane cp             PROM             Table slide flex             OH press             Db row            "  Lateral db raise             Seated banded lat pulldown             Table slide scap             Table slide abd                          ER iso  5\"x20  5\"x20         Shld iso  Abd 5\"x20 Abd 5\"x20 Abd 5\"x20         Bicep curl             Tricep pushdown             Skull crushers             Shoulder flex c ball squeeze  2x10           S/l ER  3x10           scaption  2x10 2x10 2x15         Pulley's 20x 3'  30x         Pt edu             Ther Activity                                       Gait Training                                       Modalities                                                     "

## 2024-11-13 ENCOUNTER — OFFICE VISIT (OUTPATIENT)
Dept: PHYSICAL THERAPY | Facility: CLINIC | Age: 49
End: 2024-11-13
Payer: OTHER MISCELLANEOUS

## 2024-11-13 DIAGNOSIS — Z98.890 S/P ARTHROSCOPY OF LEFT SHOULDER: Primary | ICD-10-CM

## 2024-11-13 DIAGNOSIS — M75.112 NONTRAUMATIC INCOMPLETE TEAR OF LEFT ROTATOR CUFF: ICD-10-CM

## 2024-11-13 PROCEDURE — 97112 NEUROMUSCULAR REEDUCATION: CPT | Performed by: PHYSICAL THERAPIST

## 2024-11-13 PROCEDURE — 97110 THERAPEUTIC EXERCISES: CPT | Performed by: PHYSICAL THERAPIST

## 2024-11-13 NOTE — PROGRESS NOTES
Daily Note     Today's date: 2024  Patient name: Ashwin Wright  : 1975  MRN: [id number]  Referring provider: Toro Healy DO  Dx:   Encounter Diagnosis     ICD-10-CM    1. S/P arthroscopy of left shoulder  Z98.890       2. Nontraumatic incomplete tear of left rotator cuff  M75.112                      Subjective: Pt reports he only had 3 hours of sleep last night.       Objective: See treatment diary below      Assessment: Tolerated treatment well. AROM and strength improving. Still scapular compensation for reaching overhead. Patient would benefit from continued PT      Plan: Continue per plan of care.  Progress treatment as tolerated.       Precautions:         Chronic pain disorder      Chronic pancreatitis (HCC)      Cirrhosis (HCC)  early cirrhosis    GERD (gastroesophageal reflux disease)      History of COVID-19 2022 mild s/s    Hyperlipidemia      Hypertension      Liver abscess      Liver disease      Meniscus tear  left knee work injury last assessed 2016    Pancreatitis      Pneumonia      Rotator cuff tear          Code: [id number]     POC expires Unit limit Auth Expiration date PT/OT/ST + Visit Limit?   24                                Visit/Unit Tracking  AUTH Status:  Date              Used 1 2 3             Remaining                          Manuals 10/24 11/4 11/6 11/11 11/13        Phys shld mob             Thoracic mob                                       Neuro Re-Ed             Banded TB shld ext   Black TB 20x Black TB 2x15 Black TB 2x15        Banded TB row   2x10 GTB BTB 2x15 Black TB 2x15        pulley   YTB 20x          Banded ER     GTB 2x10        Banded IR     Black TB 2x10        TRX walk outs  flex/ ext             Prone T                          Ther Ex             UBE             Supine cane flex 20x x20 x20          Wall slide 10x 2x10  20x 30x        Supine cane cp             PROM             Table slide flex             OH press     "         Db row             Lateral db raise             Seated banded lat pulldown             Table slide scap             Table slide abd                          ER iso  5\"x20  5\"x20 6x10\"        Shld iso  Abd 5\"x20 Abd 5\"x20 Abd 5\"x20 Abd 6x10\"        Bicep curl             Tricep pushdown             Skull crushers             Shoulder flex c ball squeeze  2x10           S/l ER  3x10           scaption  2x10 2x10 2x15 20x        Pulley's 20x 3'  30x 30x        Pt edu             Ther Activity                                       Gait Training                                       Modalities                                                       "

## 2024-11-18 ENCOUNTER — OFFICE VISIT (OUTPATIENT)
Dept: PHYSICAL THERAPY | Facility: CLINIC | Age: 49
End: 2024-11-18
Payer: OTHER MISCELLANEOUS

## 2024-11-18 DIAGNOSIS — Z98.890 S/P ARTHROSCOPY OF LEFT SHOULDER: Primary | ICD-10-CM

## 2024-11-18 DIAGNOSIS — M75.112 NONTRAUMATIC INCOMPLETE TEAR OF LEFT ROTATOR CUFF: ICD-10-CM

## 2024-11-18 PROCEDURE — 97110 THERAPEUTIC EXERCISES: CPT | Performed by: PHYSICAL THERAPIST

## 2024-11-18 PROCEDURE — 97112 NEUROMUSCULAR REEDUCATION: CPT | Performed by: PHYSICAL THERAPIST

## 2024-11-18 NOTE — PROGRESS NOTES
Daily Note     Today's date: 2024  Patient name: Ashwin Wright  : 1975  MRN: [id number]  Referring provider: Toro Healy DO  Dx:   Encounter Diagnosis     ICD-10-CM    1. S/P arthroscopy of left shoulder  Z98.890       2. Nontraumatic incomplete tear of left rotator cuff  M75.112                      Subjective: Pt states his shoulder is popping, but not painful during the pop.        Objective: See treatment diary below      Assessment: Tolerated treatment well. Verbal and manual cueing for scapular retraction and to prevent scapular compensatory patterns with overhead movement.  No increased pain overall post session.  Patient would benefit from continued PT      Plan: Continue per plan of care.  Progress treatment as tolerated.       Precautions:         Chronic pain disorder      Chronic pancreatitis (HCC)      Cirrhosis (HCC)  early cirrhosis    GERD (gastroesophageal reflux disease)      History of COVID-19 2022 mild s/s    Hyperlipidemia      Hypertension      Liver abscess      Liver disease      Meniscus tear  left knee work injury last assessed 2016    Pancreatitis      Pneumonia      Rotator cuff tear          Code: [id number]     POC expires Unit limit Auth Expiration date PT/OT/ST + Visit Limit?   24                                Visit/Unit Tracking  AUTH Status:  Date              Used 1 2 3             Remaining                          Manuals 10/24 11/4 11/6 11/11 11/13 11/18        Phys shld mob             Thoracic mob                                       Neuro Re-Ed             Banded TB shld ext   Black TB 20x Black TB 2x15 Black TB 2x15 Black 2x15       Banded TB row   2x10 GTB BTB 2x15 Black TB 2x15 Black 2x15       pulley   YTB 20x          Banded ER     GTB 2x10 Green 2x10        Banded IR     Black TB 2x10 Black 2x10        TRX walk outs  flex/ ext             Prone T                          Ther Ex             UBE             Supine cane  "flex 20x x20 x20          Wall slide 10x 2x10  20x 30x X30        Supine cane cp             PROM             Table slide flex             OH press             Db row             Lateral db raise             Seated banded lat pulldown             Table slide scap             Table slide abd                          ER iso  5\"x20  5\"x20 6x10\" ER 6\" x10        Shld iso  Abd 5\"x20 Abd 5\"x20 Abd 5\"x20 Abd 6x10\" Abd 6\" x10        Bicep curl             Tricep pushdown             Skull crushers             Shoulder flex c ball squeeze  2x10           S/l ER  3x10           scaption  2x10 2x10 2x15 20x X20        Pulley's 20x 3'  30x 30x X30        Pt edu      KS       Ther Activity                                       Gait Training                                       Modalities                                                       "

## 2024-11-19 DIAGNOSIS — K29.30 CHRONIC SUPERFICIAL GASTRITIS WITHOUT BLEEDING: ICD-10-CM

## 2024-11-19 RX ORDER — ESOMEPRAZOLE MAGNESIUM 40 MG/1
40 CAPSULE, DELAYED RELEASE ORAL
Qty: 180 CAPSULE | Refills: 1 | Status: SHIPPED | OUTPATIENT
Start: 2024-11-19

## 2024-11-20 ENCOUNTER — OFFICE VISIT (OUTPATIENT)
Dept: PHYSICAL THERAPY | Facility: CLINIC | Age: 49
End: 2024-11-20
Payer: OTHER MISCELLANEOUS

## 2024-11-20 ENCOUNTER — TELEPHONE (OUTPATIENT)
Age: 49
End: 2024-11-20

## 2024-11-20 DIAGNOSIS — Z98.890 S/P ARTHROSCOPY OF LEFT SHOULDER: Primary | ICD-10-CM

## 2024-11-20 DIAGNOSIS — M75.112 NONTRAUMATIC INCOMPLETE TEAR OF LEFT ROTATOR CUFF: ICD-10-CM

## 2024-11-20 PROCEDURE — 97112 NEUROMUSCULAR REEDUCATION: CPT | Performed by: PHYSICAL THERAPIST

## 2024-11-20 PROCEDURE — 97110 THERAPEUTIC EXERCISES: CPT | Performed by: PHYSICAL THERAPIST

## 2024-11-20 NOTE — PROGRESS NOTES
Daily Note     Today's date: 2024  Patient name: Ashwin Wright  : 1975  MRN: [id number]  Referring provider: Toro Healy DO  Dx:   Encounter Diagnosis     ICD-10-CM    1. S/P arthroscopy of left shoulder  Z98.890       2. Nontraumatic incomplete tear of left rotator cuff  M75.112                      Subjective: Pt offers no new complaints.       Objective: See treatment diary below      Assessment: Tolerated treatment well. Improving strength and AROM. Scapular hiking is still prominent with shoulder motion. Patient would benefit from continued PT      Plan: Continue per plan of care.  Progress treatment as tolerated.       Precautions:         Chronic pain disorder      Chronic pancreatitis (HCC)      Cirrhosis (HCC)  early cirrhosis    GERD (gastroesophageal reflux disease)      History of COVID-19 2022 mild s/s    Hyperlipidemia      Hypertension      Liver abscess      Liver disease      Meniscus tear  left knee work injury last assessed 2016    Pancreatitis      Pneumonia      Rotator cuff tear          Code: [id number]     POC expires Unit limit Auth Expiration date PT/OT/ST + Visit Limit?   24                                Visit/Unit Tracking  AUTH Status:  Date              Used 1 2 3             Remaining                          Manuals 10/24 11/4 11/6 11/11 11/13 11/18  11/20      Phys shld mob             Thoracic mob                                       Neuro Re-Ed             Banded TB shld ext   Black TB 20x Black TB 2x15 Black TB 2x15 Black 2x15 Black TB 2x15      Banded TB row   2x10 GTB BTB 2x15 Black TB 2x15 Black 2x15 Black TB 2x15      pulley   YTB 20x          Banded ER     GTB 2x10 Green 2x10  Green 2x15      Banded IR     Black TB 2x10 Black 2x10  Black 2x15      TRX walk outs  flex/ ext             Prone T                          Ther Ex             UBE             Supine cane flex 20x x20 x20          Wall slide 10x 2x10  20x 30x X30  x30   "    Supine cane cp             PROM             Table slide flex             OH press             Db row             Lateral db raise             Seated banded lat pulldown             Table slide scap             Table slide abd                          ER iso  5\"x20  5\"x20 6x10\" ER 6\" x10  ER 6x10\"      Shld iso  Abd 5\"x20 Abd 5\"x20 Abd 5\"x20 Abd 6x10\" Abd 6\" x10  Abd 6x10\"      Bicep curl             Tricep pushdown             Skull crushers             Shoulder flex c ball squeeze  2x10           S/l ER  3x10           scaption  2x10 2x10 2x15 20x X20        Pulley's 20x 3'  30x 30x X30  x30      Pt edu      KS       Ther Activity                                       Gait Training                                       Modalities                                                         "

## 2024-11-22 ENCOUNTER — OFFICE VISIT (OUTPATIENT)
Dept: OBGYN CLINIC | Facility: CLINIC | Age: 49
End: 2024-11-22
Payer: OTHER MISCELLANEOUS

## 2024-11-22 ENCOUNTER — TELEPHONE (OUTPATIENT)
Age: 49
End: 2024-11-22

## 2024-11-22 VITALS
HEART RATE: 81 BPM | OXYGEN SATURATION: 95 % | DIASTOLIC BLOOD PRESSURE: 77 MMHG | SYSTOLIC BLOOD PRESSURE: 142 MMHG | WEIGHT: 188 LBS | BODY MASS INDEX: 26.32 KG/M2 | HEIGHT: 71 IN

## 2024-11-22 DIAGNOSIS — M75.112 INCOMPLETE TEAR OF LEFT ROTATOR CUFF, UNSPECIFIED WHETHER TRAUMATIC: ICD-10-CM

## 2024-11-22 DIAGNOSIS — Z98.890 S/P ARTHROSCOPY OF LEFT SHOULDER: Primary | ICD-10-CM

## 2024-11-22 PROCEDURE — 99214 OFFICE O/P EST MOD 30 MIN: CPT | Performed by: ORTHOPAEDIC SURGERY

## 2024-11-22 NOTE — TELEPHONE ENCOUNTER
Caller: Soni    Doctor: Niraj    Reason for call: Faxed 11/22 ovn/work status to [phone]     Call back#: [phone]

## 2024-11-22 NOTE — PROGRESS NOTES
Patient Name:  Ashwin Wright  MRN:  [id number]    Assessment & Plan     1. S/P arthroscopy of left shoulder  2. Incomplete tear of left rotator cuff, unspecified whether traumatic      Left shoulder partial thickness re-tear of supraspinatus   MRI reviewed again with patient   Advised patient his motion is still limited with associated pain and weakness with special testing   Discussed the patient's diagnosis and associated treatment options including nonoperative and surgical treatment.    Nonoperative management by means of outpatient physical therapy, home exercises for ROM and strength, activity modifications, injection therapy, and OTC topical and oral analgesics. Risks of nonoperative management include progression of tear size, persistent pain, increased weakness, increased tendon retraction, increased fatty infiltration, decreased potential for repair and healing in the future due to tear progression/retraction/fatty infiltration/increasing age, and future development of rotator cuff arthropathy.    Surgical intervention was also discussed by means of Left shoulder arthroscopy with rotator cuff repair. Educated patient we would evaluate rotator cuff intraoperatively and determine if needed for revision repair vs augmentation with bioinductive collagen implant.   At this time, patient wishes to move forward with nonoperative management.   Continue outpatient PT to work on shoulder motion, postural strengthening, and light shoulder strengthening.  New work note provided  Follow up 6 weeks for discussion of nonoperative vs surgical management       History of the Present Illness   Ashwin Wright is a 49 y.o. male with Left shoulder partial thickness re-tear of supraspinatus s/p subacromial CSI performed 10/18/2024. Today, patient reports his pain is a little worse since last appointment. He had about 2 weeks of pain relief from recent injection. He has pain with sleeping and Left side lying. He admits to a  "new bruise over the deltoid \"after last PT session\". Patient reports he also had a \"fall down the stairs about 4 weeks ago when the dog tripped me\". Work injury sustained 06/21/2024. At the end of the appointment, patient states \"I am feeling better\".         Review of Systems     Review of Systems   Constitutional:  Negative for chills and fever.   HENT:  Negative for ear pain and sore throat.    Eyes:  Negative for pain and visual disturbance.   Respiratory:  Negative for cough and shortness of breath.    Cardiovascular:  Negative for chest pain and palpitations.   Gastrointestinal:  Negative for abdominal pain and vomiting.   Genitourinary:  Negative for dysuria and hematuria.   Musculoskeletal:  Negative for arthralgias and back pain.   Skin:  Negative for color change and rash.   Neurological:  Negative for seizures and syncope.   All other systems reviewed and are negative.      Physical Exam     /77   Pulse 81   Ht 5' 11\" (1.803 m)   Wt 85.3 kg (188 lb)   SpO2 95%   BMI 26.22 kg/m²     Left Shoulder:   Active range of motion   120 degrees forward flexion  130 degrees abduction  80 degrees external rotation   2 level restriction internal rotation    Passive range of motion   160 degrees of forward flexion with pain at terminal flexion  There is tenderness present over the deltoid over ecchymosis.   Forward flexion testing 4+/5  External rotation testing 5/5  Internal rotation testing 5/5  Vitale test is negative   The patient is neurovascularly intact distally in the extremity.      Data Review     I have personally reviewed pertinent films in PACS, and my interpretation follows.    MRI Left shoulder demonstrates recurrent articular sided partial thickness supraspinatus tear at the anterior insertion. Cystic changes noted a rotator cuff anchor.     Social History     Tobacco Use    Smoking status: Former     Current packs/day: 0.00     Average packs/day: 1 pack/day for 20.0 years (20.0 ttl pk-yrs) "     Types: Cigarettes     Start date: 3/15/2001     Quit date: 3/15/2021     Years since quitting: 3.6    Smokeless tobacco: Never   Vaping Use    Vaping status: Some Days    Substances: Nicotine   Substance Use Topics    Alcohol use: Yes     Alcohol/week: 1.0 standard drink of alcohol     Types: 1 Cans of beer per week     Comment: on weekends    Drug use: No           Procedures  None     Toro Healy DO

## 2024-11-22 NOTE — LETTER
November 22, 2024     Patient: Ashwin Wright  YOB: 1975  Date of Visit: 11/22/2024      To Whom it May Concern:    Ashwin Wright is under my professional care. Ashwin was seen in my office on 11/22/2024. Ashwin may return to work with the following restrictions:    No overhead work.   No lifting than greater 15lbs to waist height  No pushing or pulling greater than 15lbs  No climbing elevated structures or ladders    He will follow up in office in 6 weeks for reevaluation.   If you have any questions or concerns, please don't hesitate to call.         Sincerely,          Toro Healy,         CC: No Recipients

## 2024-11-25 ENCOUNTER — OFFICE VISIT (OUTPATIENT)
Dept: PHYSICAL THERAPY | Facility: CLINIC | Age: 49
End: 2024-11-25
Payer: OTHER MISCELLANEOUS

## 2024-11-25 DIAGNOSIS — Z98.890 S/P ARTHROSCOPY OF LEFT SHOULDER: Primary | ICD-10-CM

## 2024-11-25 DIAGNOSIS — M75.112 NONTRAUMATIC INCOMPLETE TEAR OF LEFT ROTATOR CUFF: ICD-10-CM

## 2024-11-25 PROCEDURE — 97110 THERAPEUTIC EXERCISES: CPT | Performed by: PHYSICAL THERAPIST

## 2024-11-25 PROCEDURE — 97112 NEUROMUSCULAR REEDUCATION: CPT | Performed by: PHYSICAL THERAPIST

## 2024-11-25 NOTE — PROGRESS NOTES
Daily Note     Today's date: 2024  Patient name: Ashwin Wright  : 1975  MRN: [id number]  Referring provider: Toro Healy DO  Dx:   Encounter Diagnosis     ICD-10-CM    1. S/P arthroscopy of left shoulder  Z98.890       2. Nontraumatic incomplete tear of left rotator cuff  M75.112                      Subjective: Pt arrives with large bruise around the L lateral deltoid. Reports it began last week on Friday morning. Unsure of the cause. He reports Dr. Healy assessed it at their visit last Friday.       Objective: See treatment diary below      Assessment: Pt AROM 100d. Unable to tolerate much treatment today as he reports it got sore quickly. Had to reduce activity today to accommodate. Patient would benefit from continued PT      Plan: Continue per plan of care.  Progress treatment as tolerated.       Precautions:         Chronic pain disorder      Chronic pancreatitis (HCC)      Cirrhosis (HCC)  early cirrhosis    GERD (gastroesophageal reflux disease)      History of COVID-19 2022 mild s/s    Hyperlipidemia      Hypertension      Liver abscess      Liver disease      Meniscus tear  left knee work injury last assessed 2016    Pancreatitis      Pneumonia      Rotator cuff tear          Code: [id number]     POC expires Unit limit Auth Expiration date PT/OT/ST + Visit Limit?   24                                Visit/Unit Tracking  AUTH Status:  Date              Used 1 2 3             Remaining                          Manuals 10/24 11/4 11/6 11/11 11/13 11/18  11/20 11/25     Phys shld mob             Thoracic mob                                       Neuro Re-Ed             Banded TB shld ext   Black TB 20x Black TB 2x15 Black TB 2x15 Black 2x15 Black TB 2x15 RTB 2x10     Banded TB row   2x10 GTB BTB 2x15 Black TB 2x15 Black 2x15 Black TB 2x15 Red TB 2x10     pulley   YTB 20x          Banded ER     GTB 2x10 Green 2x10  Green 2x15      Banded IR     Black TB 2x10  "Black 2x10  Black 2x15      TRX walk outs  flex/ ext             Prone T                          Ther Ex             UBE             Supine cane flex 20x x20 x20     x20     Wall slide 10x 2x10  20x 30x X30  x30      Supine cane cp             PROM             Table slide flex             OH press             Db row             Lateral db raise             Seated banded lat pulldown             Table slide scap             Table slide abd                          ER iso  5\"x20  5\"x20 6x10\" ER 6\" x10  ER 6x10\"      Shld iso  Abd 5\"x20 Abd 5\"x20 Abd 5\"x20 Abd 6x10\" Abd 6\" x10  Abd 6x10\"      Bicep curl             Tricep pushdown             Skull crushers             Shoulder flex c ball squeeze  2x10           S/l ER  3x10           scaption  2x10 2x10 2x15 20x X20        UBE        2'/2'     Pulley's 20x 3'  30x 30x X30  x30      Pt edu      KS       Ther Activity                                       Gait Training                                       Modalities                                                           "

## 2024-11-27 ENCOUNTER — OFFICE VISIT (OUTPATIENT)
Dept: PHYSICAL THERAPY | Facility: CLINIC | Age: 49
End: 2024-11-27
Payer: OTHER MISCELLANEOUS

## 2024-11-27 DIAGNOSIS — Z98.890 S/P ARTHROSCOPY OF LEFT SHOULDER: Primary | ICD-10-CM

## 2024-11-27 DIAGNOSIS — M75.112 NONTRAUMATIC INCOMPLETE TEAR OF LEFT ROTATOR CUFF: ICD-10-CM

## 2024-11-27 PROCEDURE — 97110 THERAPEUTIC EXERCISES: CPT

## 2024-11-27 PROCEDURE — 97112 NEUROMUSCULAR REEDUCATION: CPT

## 2024-11-27 NOTE — PROGRESS NOTES
Daily Note     Today's date: 2024  Patient name: Ashwin Wright  : 1975  MRN: [id number]  Referring provider: Toro Healy DO  Dx:   Encounter Diagnosis     ICD-10-CM    1. S/P arthroscopy of left shoulder  Z98.890       2. Nontraumatic incomplete tear of left rotator cuff  M75.112                      Subjective: Pt arrives with large bruise around the L lateral deltoid again today. Pt reports soreness following last session.       Objective: See treatment diary below      Assessment: Pt began on the UBE as an active warmup. Continued to focus on shoulder strengthening this session. He did need verbal cues to ensure proper rep count was performed. Pt noted increased discomfort towards end rep resisted shoulder ER with the ands but able to complete. Patient would benefit from continued PT      Plan: Continue per plan of care.  Progress treatment as tolerated.       Precautions:         Chronic pain disorder      Chronic pancreatitis (HCC)      Cirrhosis (HCC)  early cirrhosis    GERD (gastroesophageal reflux disease)      History of COVID-19 2022 mild s/s    Hyperlipidemia      Hypertension      Liver abscess      Liver disease      Meniscus tear  left knee work injury last assessed 2016    Pancreatitis      Pneumonia      Rotator cuff tear          Code: [id number]     POC expires Unit limit Auth Expiration date PT/OT/ST + Visit Limit?   24                                Visit/Unit Tracking  AUTH Status:  Date              Used 1 2 3             Remaining                          Manuals 10/24 11/4 11/6 11/11 11/13 11/18  11/20 11/25 11/27    Phys shld mob             Thoracic mob                                       Neuro Re-Ed             Banded TB shld ext   Black TB 20x Black TB 2x15 Black TB 2x15 Black 2x15 Black TB 2x15 RTB 2x10 RTB 2x10    Banded TB row   2x10 GTB BTB 2x15 Black TB 2x15 Black 2x15 Black TB 2x15 Red TB 2x10 Red TB 2x10    pulley   YTB 20x         "  Banded ER     GTB 2x10 Green 2x10  Green 2x15  Black 2x15    Banded IR     Black TB 2x10 Black 2x10  Black 2x15  Black 2x15    TRX walk outs  flex/ ext             Prone T                          Ther Ex             UBE             Supine cane flex 20x x20 x20     x20 x20    Wall slide 10x 2x10  20x 30x X30  x30  x30    Supine cane cp             PROM             Table slide flex             OH press             Db row             Lateral db raise             Seated banded lat pulldown             Table slide scap             Table slide abd                          ER iso  5\"x20  5\"x20 6x10\" ER 6\" x10  ER 6x10\"  ER 6x10\"    Shld iso  Abd 5\"x20 Abd 5\"x20 Abd 5\"x20 Abd 6x10\" Abd 6\" x10  Abd 6x10\"  Abd 6x10\"    Bicep curl             Tricep pushdown             Skull crushers             Shoulder flex c ball squeeze  2x10           S/l ER  3x10           scaption  2x10 2x10 2x15 20x X20        UBE        2'/2' 2'/2'    Pulley's 20x 3'  30x 30x X30  x30      Pt edu      KS       Ther Activity                                       Gait Training                                       Modalities                                                           "

## 2024-12-02 ENCOUNTER — OFFICE VISIT (OUTPATIENT)
Dept: PHYSICAL THERAPY | Facility: CLINIC | Age: 49
End: 2024-12-02
Payer: OTHER MISCELLANEOUS

## 2024-12-02 ENCOUNTER — TELEPHONE (OUTPATIENT)
Age: 49
End: 2024-12-02

## 2024-12-02 DIAGNOSIS — M75.112 NONTRAUMATIC INCOMPLETE TEAR OF LEFT ROTATOR CUFF: ICD-10-CM

## 2024-12-02 DIAGNOSIS — Z98.890 S/P ARTHROSCOPY OF LEFT SHOULDER: Primary | ICD-10-CM

## 2024-12-02 PROCEDURE — 97112 NEUROMUSCULAR REEDUCATION: CPT | Performed by: PHYSICAL THERAPIST

## 2024-12-02 PROCEDURE — 97110 THERAPEUTIC EXERCISES: CPT | Performed by: PHYSICAL THERAPIST

## 2024-12-02 NOTE — PROGRESS NOTES
Daily Note     Today's date: 2024  Patient name: Ashwin Wright  : 1975  MRN: [id number]  Referring provider: Toro Healy DO  Dx:   Encounter Diagnosis     ICD-10-CM    1. S/P arthroscopy of left shoulder  Z98.890       2. Nontraumatic incomplete tear of left rotator cuff  M75.112                      Subjective: Pt reports not getting much sleep as his wife was in the hospital.      Objective: See treatment diary below      Assessment: Tolerated treatment well. Shld flexion AROM 115d today. Bruise healing but slowly. Able to tolerate exercise as listed. Patient would benefit from continued PT      Plan: Continue per plan of care.  Progress treatment as tolerated.       Precautions:         Chronic pain disorder      Chronic pancreatitis (HCC)      Cirrhosis (HCC)  early cirrhosis    GERD (gastroesophageal reflux disease)      History of COVID-19 2022 mild s/s    Hyperlipidemia      Hypertension      Liver abscess      Liver disease      Meniscus tear  left knee work injury last assessed 2016    Pancreatitis      Pneumonia      Rotator cuff tear          Code: [id number]     POC expires Unit limit Auth Expiration date PT/OT/ST + Visit Limit?   24                                Visit/Unit Tracking  AUTH Status:  Date              Used 1 2 3             Remaining                          Manuals 10/24 11/4 11/6 11/11 11/13 11/18  11/20 11/25 11/27 12   Phys shld mob             Thoracic mob                                       Neuro Re-Ed             Banded TB shld ext   Black TB 20x Black TB 2x15 Black TB 2x15 Black 2x15 Black TB 2x15 RTB 2x10 RTB 2x10 BTB 2x15   Banded TB row   2x10 GTB BTB 2x15 Black TB 2x15 Black 2x15 Black TB 2x15 Red TB 2x10 Red TB 2x10 Black TB 2x15   pulley   YTB 20x          Banded ER     GTB 2x10 Green 2x10  Green 2x15  Black 2x15 Black TB 2x15    Banded IR     Black TB 2x10 Black 2x10  Black 2x15  Black 2x15 Black 2x15   TRX walk outs  flex/  "ext             Prone T                          Ther Ex             UBE             Supine cane flex 20x x20 x20     x20 x20 x25   Wall slide 10x 2x10  20x 30x X30  x30  x30 x30   Supine cane cp             PROM             Table slide flex             OH press             Db row             Lateral db raise             Seated banded lat pulldown             Table slide scap             Table slide abd                          ER iso  5\"x20  5\"x20 6x10\" ER 6\" x10  ER 6x10\"  ER 6x10\" ER 6x10\"   Shld iso  Abd 5\"x20 Abd 5\"x20 Abd 5\"x20 Abd 6x10\" Abd 6\" x10  Abd 6x10\"  Abd 6x10\" Abd 6x10\"   Bicep curl             Tricep pushdown             Skull crushers             Shoulder flex c ball squeeze  2x10           S/l ER  3x10           scaption  2x10 2x10 2x15 20x X20        UBE        2'/2' 2'/2'    Pulley's 20x 3'  30x 30x X30  x30   x30   Pt edu      KS       Ther Activity                                       Gait Training                                       Modalities                                                             "

## 2024-12-02 NOTE — TELEPHONE ENCOUNTER
Caller: Tiffanie nurse    From [facility]    Doctor: Niraj    Reason for call:  Wants to know in the Dr opinion which has the better option of a full recovery?  The surgery or current plan of treatment    Call back#: [phone]

## 2024-12-04 ENCOUNTER — OFFICE VISIT (OUTPATIENT)
Dept: PHYSICAL THERAPY | Facility: CLINIC | Age: 49
End: 2024-12-04
Payer: OTHER MISCELLANEOUS

## 2024-12-04 ENCOUNTER — TELEPHONE (OUTPATIENT)
Age: 49
End: 2024-12-04

## 2024-12-04 ENCOUNTER — OFFICE VISIT (OUTPATIENT)
Dept: FAMILY MEDICINE CLINIC | Facility: CLINIC | Age: 49
End: 2024-12-04
Payer: COMMERCIAL

## 2024-12-04 VITALS
HEART RATE: 72 BPM | HEIGHT: 71 IN | BODY MASS INDEX: 25.62 KG/M2 | SYSTOLIC BLOOD PRESSURE: 126 MMHG | WEIGHT: 183 LBS | DIASTOLIC BLOOD PRESSURE: 78 MMHG | OXYGEN SATURATION: 98 %

## 2024-12-04 DIAGNOSIS — Z79.4 CONTROLLED TYPE 2 DIABETES MELLITUS WITH HYPERGLYCEMIA, WITH LONG-TERM CURRENT USE OF INSULIN (HCC): ICD-10-CM

## 2024-12-04 DIAGNOSIS — R53.83 OTHER FATIGUE: ICD-10-CM

## 2024-12-04 DIAGNOSIS — F41.9 ANXIETY: ICD-10-CM

## 2024-12-04 DIAGNOSIS — G47.00 INSOMNIA, UNSPECIFIED TYPE: Primary | ICD-10-CM

## 2024-12-04 DIAGNOSIS — Z98.890 S/P ARTHROSCOPY OF LEFT SHOULDER: Primary | ICD-10-CM

## 2024-12-04 DIAGNOSIS — M75.112 NONTRAUMATIC INCOMPLETE TEAR OF LEFT ROTATOR CUFF: ICD-10-CM

## 2024-12-04 DIAGNOSIS — I10 PRIMARY HYPERTENSION: ICD-10-CM

## 2024-12-04 DIAGNOSIS — I10 ESSENTIAL HYPERTENSION: ICD-10-CM

## 2024-12-04 DIAGNOSIS — E11.65 CONTROLLED TYPE 2 DIABETES MELLITUS WITH HYPERGLYCEMIA, WITH LONG-TERM CURRENT USE OF INSULIN (HCC): ICD-10-CM

## 2024-12-04 LAB — SL AMB POCT HEMOGLOBIN AIC: 4.8 (ref ?–6.5)

## 2024-12-04 PROCEDURE — 97112 NEUROMUSCULAR REEDUCATION: CPT | Performed by: PHYSICAL THERAPIST

## 2024-12-04 PROCEDURE — 99214 OFFICE O/P EST MOD 30 MIN: CPT | Performed by: FAMILY MEDICINE

## 2024-12-04 PROCEDURE — 83036 HEMOGLOBIN GLYCOSYLATED A1C: CPT | Performed by: FAMILY MEDICINE

## 2024-12-04 PROCEDURE — 97110 THERAPEUTIC EXERCISES: CPT | Performed by: PHYSICAL THERAPIST

## 2024-12-04 RX ORDER — MIRTAZAPINE 7.5 MG/1
7.5 TABLET, FILM COATED ORAL
Qty: 30 TABLET | Refills: 1 | Status: SHIPPED | OUTPATIENT
Start: 2024-12-04 | End: 2024-12-04 | Stop reason: SDUPTHER

## 2024-12-04 RX ORDER — BUSPIRONE HYDROCHLORIDE 5 MG/1
5 TABLET ORAL 2 TIMES DAILY
Qty: 60 TABLET | Refills: 5 | Status: SHIPPED | OUTPATIENT
Start: 2024-12-04 | End: 2024-12-04 | Stop reason: SDUPTHER

## 2024-12-04 RX ORDER — MIRTAZAPINE 7.5 MG/1
7.5 TABLET, FILM COATED ORAL
Qty: 30 TABLET | Refills: 1 | Status: SHIPPED | OUTPATIENT
Start: 2024-12-04

## 2024-12-04 RX ORDER — MIRTAZAPINE 7.5 MG/1
7.5 TABLET, FILM COATED ORAL
Qty: 90 TABLET | Refills: 3 | Status: SHIPPED | OUTPATIENT
Start: 2024-12-04 | End: 2024-12-04 | Stop reason: SDUPTHER

## 2024-12-04 RX ORDER — BUSPIRONE HYDROCHLORIDE 5 MG/1
5 TABLET ORAL 2 TIMES DAILY
Qty: 60 TABLET | Refills: 5 | Status: SHIPPED | OUTPATIENT
Start: 2024-12-04

## 2024-12-04 NOTE — PROGRESS NOTES
Daily Note     Today's date: 2024  Patient name: Ashwin Wright  : 1975  MRN: [id number]  Referring provider: Toro Healy DO  Dx:   Encounter Diagnosis     ICD-10-CM    1. S/P arthroscopy of left shoulder  Z98.890       2. Nontraumatic incomplete tear of left rotator cuff  M75.112                      Subjective: Pt reports he has not been getting much sleep at this time.       Objective: See treatment diary below      Assessment: Tolerated treatment well. AROM to 120d shld elevation. Still having pain with most activities but able to exercise through it. Bruising still very apparent in the L shld. Patient would benefit from continued PT      Plan: Continue per plan of care.  Progress treatment as tolerated.       Precautions:         Chronic pain disorder      Chronic pancreatitis (HCC)      Cirrhosis (HCC)  early cirrhosis    GERD (gastroesophageal reflux disease)      History of COVID-19 2022 mild s/s    Hyperlipidemia      Hypertension      Liver abscess      Liver disease      Meniscus tear  left knee work injury last assessed 2016    Pancreatitis      Pneumonia      Rotator cuff tear          Code: [id number]     POC expires Unit limit Auth Expiration date PT/OT/ST + Visit Limit?   24                                Visit/Unit Tracking  AUTH Status:  Date              Used 1 2 3             Remaining                          Manuals  12/   Phys shld mob             Thoracic mob                                       Neuro Re-Ed             Banded TB shld ext Black TB 2x15  Black TB 20x Black TB 2x15 Black TB 2x15 Black 2x15 Black TB 2x15 RTB 2x10 RTB 2x10 BTB 2x15   Banded TB row Black TB 2x15  2x10 GTB BTB 2x15 Black TB 2x15 Black 2x15 Black TB 2x15 Red TB 2x10 Red TB 2x10 Black TB 2x15   pulley   YTB 20x          Banded ER Black TB 2x15    GTB 2x10 Green 2x10  Green 2x15  Black 2x15 Black TB 2x15    Banded IR  "Black 2x15    Black TB 2x10 Black 2x10  Black 2x15  Black 2x15 Black 2x15   TRX walk outs  flex/ ext             Prone T                          Ther Ex             UBE             Supine cane flex x25 x20 x20     x20 x20 x25   Wall slide  2x10  20x 30x X30  x30  x30 x30   Supine cane cp             PROM             Table slide flex             OH press             Db row             Lateral db raise             Seated banded lat pulldown             Table slide scap             Table slide abd                          ER iso 25x5\" 5\"x20  5\"x20 6x10\" ER 6\" x10  ER 6x10\"  ER 6x10\" ER 6x10\"   Shld iso 25x5\" Abd 5\"x20 Abd 5\"x20 Abd 5\"x20 Abd 6x10\" Abd 6\" x10  Abd 6x10\"  Abd 6x10\" Abd 6x10\"   Bicep curl             Tricep pushdown             Skull crushers             Shoulder flex c ball squeeze  2x10           S/l ER  3x10           scaption  2x10 2x10 2x15 20x X20        UBE        2'/2' 2'/2'    Pulley's  3'  30x 30x X30  x30   x30   Pt edu      KS       Ther Activity                                       Gait Training                                       Modalities                                                               "

## 2024-12-04 NOTE — PROGRESS NOTES
Name: Ashwin Wright      : 1975      MRN: [id number]  Encounter Provider: LUANA Mejía  Encounter Date: 2024   Encounter department: [facility] [address]  :  Assessment & Plan  Insomnia, unspecified type  Discussed issues concern increased stressors, failure of trazodone, Neurontin 7.5 nightly reviewed sleep hygiene practices  Orders:    Comprehensive metabolic panel; Future    CBC and differential; Future    TSH, 3rd generation; Future    Amylase; Future    Lipase; Future    Ammonia; Future    mirtazapine (REMERON) 7.5 MG tablet; Take 1 tablet (7.5 mg total) by mouth daily at bedtime    Other fatigue  Discussed multiple potential causative factors to include current home stressors, lack of refreshed sleep and updated meds in [place] follow-up to discuss  Orders:    Comprehensive metabolic panel; Future    CBC and differential; Future    TSH, 3rd generation; Future    Amylase; Future    Lipase; Future    Ammonia; Future    Primary hypertension  Stable within parameters continue current  Orders:    Comprehensive metabolic panel; Future    CBC and differential; Future    TSH, 3rd generation; Future    Amylase; Future    Lipase; Future    Ammonia; Future    Controlled type 2 diabetes mellitus with hyperglycemia, with long-term current use of insulin (HCC)  Stable continue current care  Lab Results   Component Value Date    HGBA1C 4.8 2024       Orders:    Comprehensive metabolic panel; Future    CBC and differential; Future    TSH, 3rd generation; Future    Amylase; Future    Lipase; Future    Ammonia; Future    POCT hemoglobin A1c    Essential hypertension    Orders:    Comprehensive metabolic panel; Future    CBC and differential; Future    TSH, 3rd generation; Future    Amylase; Future    Lipase; Future    Ammonia; Future    Anxiety  Discussed multiple family issues, start BuSpar 1 tab p.o. twice daily counseling therapy  Orders:    busPIRone  (BUSPAR) 5 mg tablet; Take 1 tablet (5 mg total) by mouth 2 (two) times a day           History of Present Illness     C/o fatigue   Just can't sleep , trazadone  at this point no longer effective   Enormous stress at home ,   Spouse with multiple mental issues   Constantly on alert for spouses condition  Chronic pancreatitis , insulin therapy  managed thru endocrine   Denies hypoglycemia       Fatigue  This is a recurrent problem. The current episode started 1 to 4 weeks ago. Associated symptoms include fatigue. Pertinent negatives include no abdominal pain, arthralgias, chest pain, chills, congestion, coughing, fever, headaches, joint swelling, myalgias, nausea, numbness, rash, sore throat, vomiting or weakness. The symptoms are aggravated by stress. He has tried nothing for the symptoms.     Review of Systems   Constitutional:  Positive for fatigue. Negative for appetite change, chills, fever and unexpected weight change.   HENT:  Negative for congestion, dental problem, ear pain, hearing loss, postnasal drip, rhinorrhea, sinus pressure, sinus pain, sneezing, sore throat, tinnitus and voice change.    Eyes:  Negative for visual disturbance.   Respiratory:  Negative for apnea, cough, chest tightness and shortness of breath.    Cardiovascular:  Negative for chest pain, palpitations and leg swelling.   Gastrointestinal:  Negative for abdominal pain, blood in stool, constipation, diarrhea, nausea and vomiting.   Endocrine: Negative for cold intolerance, heat intolerance, polydipsia, polyphagia and polyuria.   Genitourinary:  Negative for decreased urine volume, difficulty urinating, dysuria, frequency and hematuria.   Musculoskeletal:  Negative for arthralgias, back pain, gait problem, joint swelling and myalgias.   Skin:  Negative for color change, rash and wound.   Allergic/Immunologic: Negative for environmental allergies and food allergies.   Neurological:  Negative for dizziness, syncope, weakness,  "light-headedness, numbness and headaches.   Hematological:  Negative for adenopathy. Does not bruise/bleed easily.   Psychiatric/Behavioral:  Negative for sleep disturbance and suicidal ideas. The patient is not nervous/anxious.           Objective   /78   Pulse 72   Ht 5' 11\" (1.803 m)   Wt 83 kg (183 lb)   SpO2 98%   BMI 25.52 kg/m²      Physical Exam  Constitutional:       General: He is not in acute distress.     Appearance: He is well-developed. He is not ill-appearing or toxic-appearing.   HENT:      Head: Normocephalic and atraumatic.   Cardiovascular:      Rate and Rhythm: Normal rate and regular rhythm.      Heart sounds: Normal heart sounds.   Pulmonary:      Effort: Pulmonary effort is normal.      Breath sounds: Normal breath sounds.   Abdominal:      General: Bowel sounds are normal.      Palpations: Abdomen is soft.   Musculoskeletal:         General: Normal range of motion.      Cervical back: Normal range of motion and neck supple.   Skin:     General: Skin is warm and dry.   Neurological:      Mental Status: He is alert and oriented to person, place, and time.      Deep Tendon Reflexes: Reflexes are normal and symmetric.   Psychiatric:         Behavior: Behavior normal.         Thought Content: Thought content normal.         Judgment: Judgment normal.         "

## 2024-12-04 NOTE — TELEPHONE ENCOUNTER
Caller: Geraldine-Spouse    Doctor: Niraj    Reason for call: Patient is scheduled for an appointment 12/23/2024. Geraldine states patient needs to  be seen before 12/21/2024 due to insurance ending. Please call patient and adivse if they can be seen before 12/21/2024 for their follow up appointment. Thank you.    Call back#: [phone]-Geraldine

## 2024-12-04 NOTE — ASSESSMENT & PLAN NOTE
Stable continue current care  Lab Results   Component Value Date    HGBA1C 4.8 12/04/2024       Orders:    Comprehensive metabolic panel; Future    CBC and differential; Future    TSH, 3rd generation; Future    Amylase; Future    Lipase; Future    Ammonia; Future    POCT hemoglobin A1c    
Stable within parameters continue current  Orders:    Comprehensive metabolic panel; Future    CBC and differential; Future    TSH, 3rd generation; Future    Amylase; Future    Lipase; Future    Ammonia; Future    
18

## 2024-12-04 NOTE — PROGRESS NOTES
Diabetic Foot Exam    Patient's shoes and socks removed.    Right Foot/Ankle   Right Foot Inspection  Skin Exam: skin normal. Skin not intact, no dry skin, no warmth, no callus, no erythema, no maceration, no abnormal color, no pre-ulcer, no ulcer and no callus.     Toe Exam: ROM and strength within normal limits.     Sensory   Vibration: intact  Proprioception: intact  Monofilament testing: intact    Vascular  Capillary refills: < 3 seconds  The right DP pulse is 2+. The right PT pulse is 2+.     Left Foot/Ankle  Left Foot Inspection  Skin Exam: skin normal. Skin not intact, no dry skin, no warmth, no erythema, no maceration, normal color, no pre-ulcer, no ulcer and no callus.     Toe Exam: ROM and strength within normal limits.     Sensory   Vibration: intact  Proprioception: intact  Monofilament testing: intact    Vascular  Capillary refills: < 3 seconds  The left DP pulse is 2+. The left PT pulse is 2+.     Assign Risk Category  No deformity present  No loss of protective sensation  No weak pulses  Risk: 0

## 2024-12-10 ENCOUNTER — OFFICE VISIT (OUTPATIENT)
Dept: PHYSICAL THERAPY | Facility: CLINIC | Age: 49
End: 2024-12-10
Payer: OTHER MISCELLANEOUS

## 2024-12-10 DIAGNOSIS — M75.112 NONTRAUMATIC INCOMPLETE TEAR OF LEFT ROTATOR CUFF: ICD-10-CM

## 2024-12-10 DIAGNOSIS — Z98.890 S/P ARTHROSCOPY OF LEFT SHOULDER: Primary | ICD-10-CM

## 2024-12-10 PROCEDURE — 97112 NEUROMUSCULAR REEDUCATION: CPT | Performed by: PHYSICAL THERAPIST

## 2024-12-10 PROCEDURE — 97110 THERAPEUTIC EXERCISES: CPT | Performed by: PHYSICAL THERAPIST

## 2024-12-10 NOTE — PROGRESS NOTES
Daily Note     Today's date: 12/10/2024  Patient name: Ashwin Wright  : 1975  MRN: [id number]  Referring provider: Toro Healy DO  Dx:   Encounter Diagnosis     ICD-10-CM    1. S/P arthroscopy of left shoulder  Z98.890       2. Nontraumatic incomplete tear of left rotator cuff  M75.112                      Subjective: Pt reports only getting 3-4 hours of sleep last night.       Objective: See treatment diary below      Assessment: Tolerated treatment fair. Pt able to complete all listed exercise. Minimal pain reported and ROM and strength continue to improve. However, bruise continues to be apparent with slow healing process. Patient would benefit from continued PT      Plan: Continue per plan of care.  Progress treatment as tolerated.       Precautions:         Chronic pain disorder      Chronic pancreatitis (HCC)      Cirrhosis (HCC)  early cirrhosis    GERD (gastroesophageal reflux disease)      History of COVID-19 2022 mild s/s    Hyperlipidemia      Hypertension      Liver abscess      Liver disease      Meniscus tear  left knee work injury last assessed 2016    Pancreatitis      Pneumonia      Rotator cuff tear          Code: [id number]     POC expires Unit limit Auth Expiration date PT/OT/ST + Visit Limit?   24                                Visit/Unit Tracking  AUTH Status:  Date              Used 1 2 3             Remaining                          Manuals 12/4 12/10 11/6 11/11 11/13 11/18  11/20 11/25 11/27 12   Phys shld mob             Thoracic mob                                       Neuro Re-Ed             Banded TB shld ext Black TB 2x15 Black TB 2x15 Black TB 20x Black TB 2x15 Black TB 2x15 Black 2x15 Black TB 2x15 RTB 2x10 RTB 2x10 BTB 2x15   Banded TB row Black TB 2x15 Black 2x15 2x10 GTB BTB 2x15 Black TB 2x15 Black 2x15 Black TB 2x15 Red TB 2x10 Red TB 2x10 Black TB 2x15   pulley   YTB 20x          Banded ER Black TB 2x15 Black TB 2x15   GTB 2x10 Green  "2x10  Green 2x15  Black 2x15 Black TB 2x15    Banded IR Black 2x15 Black 2x15   Black TB 2x10 Black 2x10  Black 2x15  Black 2x15 Black 2x15   TRX walk outs  flex/ ext             Prone T                          Ther Ex             UBE             Supine cane flex x25 x25 x20     x20 x20 x25   Wall slide    20x 30x X30  x30  x30 x30   Supine cane cp             PROM             Table slide flex             OH press             Db row             Lateral db raise             Seated banded lat pulldown             Table slide scap             Table slide abd                          ER iso 25x5\" 10x10\"  5\"x20 6x10\" ER 6\" x10  ER 6x10\"  ER 6x10\" ER 6x10\"   Shld iso 25x5\" 10x10\" Abd 5\"x20 Abd 5\"x20 Abd 6x10\" Abd 6\" x10  Abd 6x10\"  Abd 6x10\" Abd 6x10\"   Bicep curl             Tricep pushdown             Skull crushers             Shoulder flex c ball squeeze             S/l ER             scaption   2x10 2x15 20x X20        UBE  2'/2'      2'/2' 2'/2'    Pulley's    30x 30x X30  x30   x30   Pt edu      KS       Ther Activity                                       Gait Training                                       Modalities                                                                 "

## 2024-12-12 ENCOUNTER — OFFICE VISIT (OUTPATIENT)
Dept: PHYSICAL THERAPY | Facility: CLINIC | Age: 49
End: 2024-12-12
Payer: OTHER MISCELLANEOUS

## 2024-12-12 DIAGNOSIS — Z98.890 S/P ARTHROSCOPY OF LEFT SHOULDER: Primary | ICD-10-CM

## 2024-12-12 DIAGNOSIS — M75.112 NONTRAUMATIC INCOMPLETE TEAR OF LEFT ROTATOR CUFF: ICD-10-CM

## 2024-12-12 PROCEDURE — 97110 THERAPEUTIC EXERCISES: CPT | Performed by: PHYSICAL THERAPIST

## 2024-12-12 PROCEDURE — 97112 NEUROMUSCULAR REEDUCATION: CPT | Performed by: PHYSICAL THERAPIST

## 2024-12-12 NOTE — PROGRESS NOTES
PT Re-evaluation     Today's date: 2024  Patient name: Ashwin Wright  : 1975  MRN: [id number]  Referring provider: Toro Healy DO  Dx:   Encounter Diagnosis     ICD-10-CM    1. S/P arthroscopy of left shoulder  Z98.890 Ambulatory Referral to Physical Therapy      2. Traumatic incomplete tear of left rotator cuff, initial encounter  S46.012A Ambulatory Referral to Physical Therapy                     Assessment  Impairments: abnormal or restricted ROM, activity intolerance, impaired physical strength and pain with function    Assessment details: Pt is a 50 y/o male who presents to physical therapy with primary nociceptive pain associated with L rotator cuff related shoulder pain in the environment of reduced thoracic mobility complicated by significant history of multiple shoulder injuries and surgeries, poor nutrition with a number of GI related issues, and liver disease. Pt has made some improvement in ROM and strength since last evaluation. Passed physical exam for return to work. Returning to [facility] next week for return to work. Pt on hold until seeing MD.   Understanding of Dx/Px/POC: good    Goals  STG (4 weeks): -MET  Pt will be independent with HEP.  Pt will demonstrate increase in flexion ROM by 5-10d.  Pt will demonstrate increase in MMT grade by 1/3 for ER.    LTG (8 weeks): - NOT MET  FOTO will be expected outcome.   Pt will demonstrate painfree flexion ROM comparable to contralateral limb.  Pt will demonstrate MMT grade comparable to contralateral limb in all deficient muscle groups.      Plan: d/c      Subjective Evaluation    History of Present Illness  Mechanism of injury: Chief Complaint: Pt reports that on 24 while pulling his truck door down it got stuck. He forcefully pulled it down and it did not move. This put a lot of pressure on his shoulder and he had pain immediately. Pt reports that since this has happened he has had significant pain in the shoulder when he goes to  move it. MRI revealed a recurrent small, partial thickness articular sided tear of supraspinatus tendon fibers at the footprint with mild retraction. He also reports pain in the lateral arm at times. Some bruising as well.    (9/23): Pt reports he was doing well. However, on Saturday, he reports lifting 4 pyrex dishes overhead to put them away and felt a pop in his shoulder. He reports having excruciating pain in the shoulder following and it continues until today. States he can't lay on that side now. Reports that he hasn't been able to do much with his shoulder since.     (10/24): Pt went to [facility]. MRI showed tear of supraspinatus. CSI given. Improved pain and ROM. Still limited ROM and unable to tolerate much activity.     (12/12): Pt reports improving pain, strength and ROM. Passed physical exam for driving. Returning to [facility] on Monday for return to work.     Severity: severe  Irritability: moderate  Nature: nociceptive  Stage: subacute  Stability: no change    P1: see body chart  Patient Goals  Patient goals for therapy: decreased pain and return to work        Objective     Observations     Additional Observation Details  Bruising on the L lateral arm, no evidence of change in muscle bulk of the upper arm  Bruising of the superior L shld, tender to the touch, pt unsure of how this got there- he did report he had been doing belly flops in the pool and had gotten a number of bruises on his abdomen from this- these were visualized    Cervical/Thoracic Screen   Cervical range of motion within normal limits  Cervical range of motion within normal limits with the following exceptions: No recreation of pain    Active Range of Motion   Left Shoulder   Flexion: 110 degrees with pain  Abduction: 110 degrees with pain  External rotation 0°: WFL and with pain    Right Shoulder   Normal active range of motion    Passive Range of Motion   Left Shoulder   Flexion: 120 degrees with pain  Abduction: 90d  External rotation 0°: WFL  and with pain    Additional Passive Range of Motion Details  Empty end feel with all PROM and muscular guarding today    Joint Play   Left Shoulder  Joints within functional limits are the posterior capsule.     Additional Joint Play Details  Hypomobile T1-3    Strength/Myotome Testing     Shld:  Flex: 4/5  Abd: 4/5  ER: 5/5 R; 3+/5 L  IR: 5/5 R; 3+/5    Increased tremoring today, pt reported this was related to the patient having to take care of his wife and missing pill times.              Precautions:         Chronic pain disorder      Chronic pancreatitis (HCC)      Cirrhosis (HCC)  early cirrhosis    GERD (gastroesophageal reflux disease)      History of COVID-19 01/2022 mild s/s    Hyperlipidemia      Hypertension      Liver abscess      Liver disease      Meniscus tear  left knee work injury last assessed 08/24/2016    Pancreatitis      Pneumonia      Rotator cuff tear          POC expires Unit limit Auth Expiration date PT/OT/ST + Visit Limit?   9/16/24                              Visit/Unit Tracking  AUTH Status:  Date 7/22               Used 1               Remaining                  Manuals 12/4 12/10 12/12 11/11 11/13 11/18  11/20 11/25 11/27 12/2   Phys shld mob             Thoracic mob                                       Neuro Re-Ed             Banded TB shld ext Black TB 2x15 Black TB 2x15 Black TB 2x15 Black TB 2x15 Black TB 2x15 Black 2x15 Black TB 2x15 RTB 2x10 RTB 2x10 BTB 2x15   Banded TB row Black TB 2x15 Black 2x15 Black 2x15 BTB 2x15 Black TB 2x15 Black 2x15 Black TB 2x15 Red TB 2x10 Red TB 2x10 Black TB 2x15   pulley             Banded ER Black TB 2x15 Black TB 2x15 Black TB 2x15  GTB 2x10 Green 2x10  Green 2x15  Black 2x15 Black TB 2x15    Banded IR Black 2x15 Black 2x15 Black 2x15  Black TB 2x10 Black 2x10  Black 2x15  Black 2x15 Black 2x15   TRX walk outs  flex/ ext             Prone T                          Ther Ex             UBE             Supine cane flex x25 x25 x35     x20 x20  "x25   Wall slide   30x 20x 30x X30  x30  x30 x30   Supine cane cp             PROM             Table slide flex             OH press             Db row             Lateral db raise             Seated banded lat pulldown             Table slide scap             Table slide abd                          ER iso 25x5\" 10x10\" 10x10\" 5\"x20 6x10\" ER 6\" x10  ER 6x10\"  ER 6x10\" ER 6x10\"   Shld iso 25x5\" 10x10\" 10x10\" Abd 5\"x20 Abd 6x10\" Abd 6\" x10  Abd 6x10\"  Abd 6x10\" Abd 6x10\"   Bicep curl             Tricep pushdown             Skull crushers             Shoulder flex c ball squeeze             S/l ER             scaption    2x15 20x X20        UBE  2'/2' 2'/2'     2'/2' 2'/2'    Pulley's    30x 30x X30  x30   x30   Pt edu      KS       Ther Activity                                       Gait Training                                       Modalities                                                "

## 2024-12-16 ENCOUNTER — OFFICE VISIT (OUTPATIENT)
Dept: OBGYN CLINIC | Facility: CLINIC | Age: 49
End: 2024-12-16
Payer: OTHER MISCELLANEOUS

## 2024-12-16 ENCOUNTER — HOSPITAL ENCOUNTER (OUTPATIENT)
Dept: MRI IMAGING | Facility: HOSPITAL | Age: 49
Discharge: HOME | End: 2024-12-16
Attending: PSYCHIATRY & NEUROLOGY
Payer: COMMERCIAL

## 2024-12-16 VITALS
DIASTOLIC BLOOD PRESSURE: 86 MMHG | WEIGHT: 192.8 LBS | HEART RATE: 72 BPM | BODY MASS INDEX: 30.26 KG/M2 | SYSTOLIC BLOOD PRESSURE: 137 MMHG | HEIGHT: 67 IN

## 2024-12-16 DIAGNOSIS — M75.112 INCOMPLETE TEAR OF LEFT ROTATOR CUFF, UNSPECIFIED WHETHER TRAUMATIC: ICD-10-CM

## 2024-12-16 DIAGNOSIS — R41.3 MEMORY DIFFICULTIES: ICD-10-CM

## 2024-12-16 DIAGNOSIS — Z98.890 S/P ARTHROSCOPY OF LEFT SHOULDER: Primary | ICD-10-CM

## 2024-12-16 PROCEDURE — 70551 MRI BRAIN STEM W/O DYE: CPT

## 2024-12-16 PROCEDURE — 99213 OFFICE O/P EST LOW 20 MIN: CPT | Performed by: ORTHOPAEDIC SURGERY

## 2024-12-16 NOTE — LETTER
December 16, 2024     Patient: Ashwin Wright  YOB: 1975  Date of Visit: 12/16/2024      To Whom it May Concern:    Ashwin Wright is under my professional care. Ashwin was seen in my office on 12/16/2024. Ashwin may resume work without restriction on 12/23/2024.     If you have any questions or concerns, please don't hesitate to call.         Sincerely,          Toro Healy,         CC: No Recipients

## 2024-12-16 NOTE — PROGRESS NOTES
Patient Name:  Ashwin Wright  MRN:  [id number]    Assessment & Plan     1. S/P arthroscopy of left shoulder  2. Incomplete tear of left rotator cuff, unspecified whether traumatic        Left shoulder partial thickness re-tear of supraspinatus, 18 months s/p left shoulder arthroscopic rotator cuff repair, open subpectoral biceps tenodesis, acromioplasty and debridement performed on 6/9/23  Patient has improved symptoms overall with minor discomfort with overhead motion.   Patient to be full weightbearing to LUE (Left Upper Extremity)  ROM as tolerated to LUE (Left Upper Extremity)  It was discussed it is too early for a repeat injection at this time. Also, it was explained this is an area we do not like to inject frequently due to the effects on tendons.   Discussed with patient he is encouraged to continue with a home exercise plan for life to maintain his range of motion and strength.  Patient to continue physical therapy for strength and mobility training  Patient to continue at home analgesic regimen with Tylenol   Patient may resume working on 12/23/2024 without restrictions. A note was provided.   Patient to follow up as needed.          History of the Present Illness   Ashwin Wright is a 49 y.o. male with Left shoulder partial thickness re-tear of supraspinatus s/p subacromial CSI performed 10/18/2024. He reports he is doing pretty good, however, the pain is beginning to return. He continues to attend physical therapy and home physical therapy with mild improvement in symptoms. He has pain with overhead activities. He would like a repeat CSI and to resume working on Monday.         Review of Systems     Review of Systems   Constitutional:  Negative for chills and fever.   HENT:  Negative for ear pain and sore throat.    Eyes:  Negative for pain and visual disturbance.   Respiratory:  Negative for cough and shortness of breath.    Cardiovascular:  Negative for chest pain and palpitations.  "  Gastrointestinal:  Negative for abdominal pain and vomiting.   Genitourinary:  Negative for dysuria and hematuria.   Musculoskeletal:  Negative for arthralgias and back pain.   Skin:  Negative for color change and rash.   Neurological:  Negative for seizures and syncope.   All other systems reviewed and are negative.      Physical Exam     /86   Pulse 72   Ht 5' 7\" (1.702 m)   Wt 87.5 kg (192 lb 12.8 oz)   BMI 30.20 kg/m²     Left Shoulder:   Active range of motion   170 degrees forward flexion  180 degrees abduction  80 degrees external rotation   Equal mid thoracic internal rotation    There is mild tenderness present over the biceps.   Forward flexion testing 5/5  External rotation testing 5/5  Internal rotation testing 5/5  Vitale test is negative   The patient is neurovascularly intact distally in the extremity.      Data Review     I have personally reviewed pertinent films in PACS, and my interpretation follows.    MRI Left shoulder demonstrates recurrent articular sided partial thickness supraspinatus tear at the anterior insertion. Cystic changes noted a rotator cuff anchor.     Social History     Tobacco Use   • Smoking status: Former     Current packs/day: 0.00     Average packs/day: 1 pack/day for 20.0 years (20.0 ttl pk-yrs)     Types: Cigarettes     Start date: 3/15/2001     Quit date: 3/15/2021     Years since quitting: 3.7   • Smokeless tobacco: Never   Vaping Use   • Vaping status: Some Days   • Substances: Nicotine   Substance Use Topics   • Alcohol use: Yes     Alcohol/week: 1.0 standard drink of alcohol     Types: 1 Cans of beer per week     Comment: on weekends   • Drug use: No           Procedures  None performed.     Veronica Whittaker   Scribe Attestation    I,:  Veronica Whittaker am acting as a scribe while in the presence of the attending physician.:       I,:  Toro Healy, DO personally performed the services described in this documentation    as scribed in my presence.:           "

## 2024-12-17 ENCOUNTER — OFFICE VISIT (OUTPATIENT)
Dept: PHYSICAL THERAPY | Facility: CLINIC | Age: 49
End: 2024-12-17
Payer: OTHER MISCELLANEOUS

## 2024-12-17 DIAGNOSIS — M75.112 NONTRAUMATIC INCOMPLETE TEAR OF LEFT ROTATOR CUFF: ICD-10-CM

## 2024-12-17 DIAGNOSIS — Z98.890 S/P ARTHROSCOPY OF LEFT SHOULDER: Primary | ICD-10-CM

## 2024-12-17 PROCEDURE — 97140 MANUAL THERAPY 1/> REGIONS: CPT | Performed by: PHYSICAL THERAPIST

## 2024-12-17 PROCEDURE — 97110 THERAPEUTIC EXERCISES: CPT | Performed by: PHYSICAL THERAPIST

## 2024-12-17 NOTE — PROGRESS NOTES
PT Discharge     Today's date: 2024  Patient name: Ashwin Wright  : 1975  MRN: [id number]  Referring provider: Toro Healy DO  Dx:   Encounter Diagnosis     ICD-10-CM    1. S/P arthroscopy of left shoulder  Z98.890 Ambulatory Referral to Physical Therapy      2. Traumatic incomplete tear of left rotator cuff, initial encounter  S46.012A Ambulatory Referral to Physical Therapy                     Assessment  Impairments: abnormal or restricted ROM, activity intolerance, impaired physical strength and pain with function    Assessment details: Pt is a 50 y/o male who presents to physical therapy with primary nociceptive pain associated with L rotator cuff related shoulder pain in the environment of reduced thoracic mobility complicated by significant history of multiple shoulder injuries and surgeries, poor nutrition with a number of GI related issues, and liver disease. Pt has significant improvement. Full ROM and strength. PT and pt discussed d/c as pt returning to work and no deficits, pt agreeable.   Understanding of Dx/Px/POC: good    Goals  STG (4 weeks): -MET  Pt will be independent with HEP.  Pt will demonstrate increase in flexion ROM by 5-10d.  Pt will demonstrate increase in MMT grade by 1/3 for ER.    LTG (8 weeks): - MET  FOTO will be expected outcome.   Pt will demonstrate painfree flexion ROM comparable to contralateral limb.  Pt will demonstrate MMT grade comparable to contralateral limb in all deficient muscle groups.      Plan: d/c      Subjective Evaluation    History of Present Illness  Mechanism of injury: Chief Complaint: Pt reports that on 24 while pulling his truck door down it got stuck. He forcefully pulled it down and it did not move. This put a lot of pressure on his shoulder and he had pain immediately. Pt reports that since this has happened he has had significant pain in the shoulder when he goes to move it. MRI revealed a recurrent small, partial thickness  articular sided tear of supraspinatus tendon fibers at the footprint with mild retraction. He also reports pain in the lateral arm at times. Some bruising as well.    (9/23): Pt reports he was doing well. However, on Saturday, he reports lifting 4 pyrex dishes overhead to put them away and felt a pop in his shoulder. He reports having excruciating pain in the shoulder following and it continues until today. States he can't lay on that side now. Reports that he hasn't been able to do much with his shoulder since.     (10/24): Pt went to [facility]. MRI showed tear of supraspinatus. CSI given. Improved pain and ROM. Still limited ROM and unable to tolerate much activity.     (12/12): Pt reports improving pain, strength and ROM. Passed physical exam for driving. Returning to [facility] on Monday for return to work.     (12/17): Pt reports that since he has been chopping wood, his shoulder has been feeling very good.     Severity: severe  Irritability: moderate  Nature: nociceptive  Stage: subacute  Stability: no change    P1: see body chart  Patient Goals  Patient goals for therapy: decreased pain and return to work        Objective     Observations     Additional Observation Details  Bruising on the L lateral arm, no evidence of change in muscle bulk of the upper arm  Bruising of the superior L shld, tender to the touch, pt unsure of how this got there- he did report he had been doing belly flops in the pool and had gotten a number of bruises on his abdomen from this- these were visualized    Cervical/Thoracic Screen   Cervical range of motion within normal limits  Cervical range of motion within normal limits with the following exceptions: No recreation of pain    Active Range of Motion   Left Shoulder   Flexion: 155d  Abduction: 155d  External rotation 0°: WNL    Right Shoulder   Normal active range of motion    Passive Range of Motion   Left Shoulder   Flexion: WNL  Abduction: WNL  External rotation 0°: WNL        Joint Play    Left Shoulder  Joints within functional limits are the posterior capsule.     Additional Joint Play Details  Hypomobile T1-3    Strength/Myotome Testing     Shld:  Flex: 5/5  Abd: 5/5  ER: 5/5 R; 5/5 L  IR: 5/5 R; 5/5       Precautions:         Chronic pain disorder      Chronic pancreatitis (HCC)      Cirrhosis (HCC)  early cirrhosis    GERD (gastroesophageal reflux disease)      History of COVID-19 01/2022 mild s/s    Hyperlipidemia      Hypertension      Liver abscess      Liver disease      Meniscus tear  left knee work injury last assessed 08/24/2016    Pancreatitis      Pneumonia      Rotator cuff tear          POC expires Unit limit Auth Expiration date PT/OT/ST + Visit Limit?   9/16/24                              Visit/Unit Tracking  AUTH Status:  Date 7/22               Used 1               Remaining                  Manuals 12/4 12/10 12/12 12/17 11/13 11/18  11/20 11/25 11/27 12/2   Phys shld mob    TARUN         Thoracic mob             Post GH jt glide    TARUN                      Neuro Re-Ed             Banded TB shld ext Black TB 2x15 Black TB 2x15 Black TB 2x15  Black TB 2x15 Black 2x15 Black TB 2x15 RTB 2x10 RTB 2x10 BTB 2x15   Banded TB row Black TB 2x15 Black 2x15 Black 2x15  Black TB 2x15 Black 2x15 Black TB 2x15 Red TB 2x10 Red TB 2x10 Black TB 2x15   pulley             Banded ER Black TB 2x15 Black TB 2x15 Black TB 2x15  GTB 2x10 Green 2x10  Green 2x15  Black 2x15 Black TB 2x15    Banded IR Black 2x15 Black 2x15 Black 2x15  Black TB 2x10 Black 2x10  Black 2x15  Black 2x15 Black 2x15   TRX walk outs  flex/ ext             Prone T                          Ther Ex             UBE             Supine cane flex x25 x25 x35 x35    x20 x20 x25   Wall slide   30x  30x X30  x30  x30 x30   Supine cane cp             PROM             Table slide flex             OH press             Db row             Lateral db raise             Seated banded lat pulldown             Table slide scap             Table slide  "abd                          ER iso 25x5\" 10x10\" 10x10\"  6x10\" ER 6\" x10  ER 6x10\"  ER 6x10\" ER 6x10\"   Shld iso 25x5\" 10x10\" 10x10\"  Abd 6x10\" Abd 6\" x10  Abd 6x10\"  Abd 6x10\" Abd 6x10\"   Bicep curl             Tricep pushdown             Skull crushers             Shoulder flex c ball squeeze             S/l ER             scaption     20x X20        UBE  2'/2' 2'/2' 2'/2'    2'/2' 2'/2'    Pulley's     30x X30  x30   x30   Pt edu      KS       Ther Activity                                       Gait Training                                       Modalities                                                "

## 2024-12-19 ENCOUNTER — APPOINTMENT (OUTPATIENT)
Dept: PHYSICAL THERAPY | Facility: CLINIC | Age: 49
End: 2024-12-19
Payer: OTHER MISCELLANEOUS

## 2024-12-19 ENCOUNTER — TELEPHONE (OUTPATIENT)
Dept: NEUROLOGY | Facility: CLINIC | Age: 49
End: 2024-12-19

## 2024-12-19 ENCOUNTER — OFFICE VISIT (OUTPATIENT)
Dept: GASTROENTEROLOGY | Facility: CLINIC | Age: 49
End: 2024-12-19
Payer: COMMERCIAL

## 2024-12-19 VITALS
HEART RATE: 77 BPM | RESPIRATION RATE: 18 BRPM | OXYGEN SATURATION: 97 % | WEIGHT: 192 LBS | DIASTOLIC BLOOD PRESSURE: 84 MMHG | BODY MASS INDEX: 30.13 KG/M2 | TEMPERATURE: 97.6 F | SYSTOLIC BLOOD PRESSURE: 140 MMHG | HEIGHT: 67 IN

## 2024-12-19 DIAGNOSIS — K74.60 CIRRHOSIS OF LIVER WITHOUT ASCITES, UNSPECIFIED HEPATIC CIRRHOSIS TYPE (HCC): Primary | ICD-10-CM

## 2024-12-19 DIAGNOSIS — R41.3 SHORT-TERM MEMORY LOSS: ICD-10-CM

## 2024-12-19 DIAGNOSIS — R19.4 FREQUENT BOWEL MOVEMENTS: ICD-10-CM

## 2024-12-19 PROCEDURE — 99214 OFFICE O/P EST MOD 30 MIN: CPT | Performed by: PHYSICIAN ASSISTANT

## 2024-12-19 NOTE — TELEPHONE ENCOUNTER
Discussed with patient MRI of the brain results he does have history of liver issues there is no acute abnormality on the MRI scan discussed with the patient and advised him to follow-up with the PCP regarding his liver issues and make sure that he is not drinking any alcohol

## 2024-12-19 NOTE — PROGRESS NOTES
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; has a history of familial hypertriglyceridemia with recurrent pancreatitis requiring even plasmapheresis in the past as well as cyst gastrostomy for pancreatic pseudocyst complicated by liver abscess in 2016  - Prior workup for elevated LFTs has been negative for viral hepatitis, neg DEANNA, negative anti-smooth muscle ab, negative AMA, normal ceruloplasmin, normal alpha 1 antitrypsin; had a high ferritin in the past with normal iron saturation so could consider hemochromatosis testing  - MELD labs pending, likely low  - Grade 0 HE but recently reports some memory loss; no asterixis on exam, will follow up ammonia level as ordered by PCP  - No history of ascites and no LE edema  - EGD last done in  at [facility] which showed mild PHG, gastritis, and reflux esophagitis but no varices; will repeat in  for surveillance  - HCC screen neg with US in July, repeat in January and q6 months for continued screening  - Follow up in 6 months  Orders:    Protime-INR; Future    US right upper quadrant; Future    Frequent bowel movements  - He is having frequent formed bowel movements with a sensation of incomplete evacuation and urgency when he eats  - He is eating a high fiber diet and supplementing with a fiber powder every day  - Will add miralax 17 g daily and titrate for better evacuation, he will let me know how this goes so we can adjust before he goes back to work as he is a        Short-term memory loss  - As above, follow up ammonia level but no asterixis on exam so unlikely to be HE           History of Present Illness    RUSTAM Wright is a 49 y.o. male who presents for routine follow-up of his newly diagnosed cirrhosis over the summer.  He has a history of elevated LFTs and hepatic steatosis with hepatomegaly for years as well as hypertriglyceridemia.  He is scoring progressive cirrhosis over the summer.  He really has no complaints.  He denies any abdominal pain, lower extremity swelling.  There is no black or bloody bowel movements.  The only issue he is having is that for the past several months he is having frequent urgent bowel movements.  There is no diarrhea and his stool is formed but he is going small amounts 4-5 times a day with urgency after he eats.  He has a sensation of incomplete evacuation.  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  His weight has remained stable around 190.  There is no nausea or vomiting.  His reflux is controlled on Nexium.  He used to drink several beers per week but really has stopped drinking since his elastography showed progression to cirrhosis and beer seems to give him diarrhea now.    History obtained from: patient    Review of Systems  Medical History Reviewed by provider this encounter:  Tobacco  Allergies  Meds  Problems  Med Hx  Surg Hx  Fam Hx     .  Current Outpatient Medications on File Prior to Visit   Medication Sig Dispense Refill    albuterol (PROVENTIL HFA,VENTOLIN HFA) 90 mcg/act inhaler INHALE TWO PUFFS BY MOUTH EVERY 6 HOURS AS NEEDED FOR WHEEZING 54 g 0    amLODIPine (NORVASC) 5 mg tablet Take 1 tablet (5 mg total) by mouth daily 90 tablet 1    busPIRone (BUSPAR) 5 mg tablet Take 1 tablet (5 mg total) by mouth 2 (two) times a day 60 tablet 5    Choline Fenofibrate (Fenofibric Acid) 135 MG CPDR TAKE ONE CAPSULE BY MOUTH EVERY DAY 90 capsule 1    Empagliflozin 10 MG TABS Take 10 mg by mouth every morning       "esomeprazole (NexIUM) 40 MG capsule Take 1 capsule (40 mg total) by mouth 2 (two) times a day before meals 180 capsule 1    famotidine (PEPCID) 40 MG tablet Take 40 mg by mouth daily at bedtime as needed       fluticasone (FLONASE) 50 mcg/act nasal spray 1 spray into each nostril daily 9 mL 1    insulin degludec (Tresiba FlexTouch) 100 units/mL injection pen 20 units subcut in hs      Insulin Pen Needle (Pen Needles) 32G X 5 MM MISC use once daily as directed      lisinopril (ZESTRIL) 20 mg tablet TAKE ONE TABLET BY MOUTH EVERY DAY 90 tablet 1    mirtazapine (REMERON) 7.5 MG tablet Take 1 tablet (7.5 mg total) by mouth daily at bedtime 30 tablet 1    Multiple Vitamins-Minerals (MULTIVITAMIN ADULTS PO) Take by mouth daily after breakfast      NON FORMULARY Pantessin 1 capsule daily      ondansetron (ZOFRAN-ODT) 4 mg disintegrating tablet Take 1 tablet (4 mg total) by mouth every 8 (eight) hours as needed for nausea or vomiting 30 tablet 0    Pancrelipase, Lip-Prot-Amyl, (Creon) 30558-781956 units CPEP TAKE 1 CAPSULE (36,000 UNITS TOTAL) BY MOUTH 3 (THREE) TIMES A DAY BEFORE MEALS 270 capsule 1    propranolol (INDERAL) 20 mg tablet TAKE 1 TABLET BY MOUTH TWICE DAILY 180 tablet 0    rosuvastatin (CRESTOR) 40 MG tablet TAKE ONE TABLET BY MOUTH EVERY DAY 90 tablet 1    TURMERIC PO Take by mouth in the morning      omega-3-acid ethyl esters (LOVAZA) 1 g capsule TAKE TWO CAPSULES BY MOUTH TWICE DAILY (Patient not taking: Reported on 8/19/2024) 360 capsule 0    pancrelipase, Lip-Prot-Amyl, (Creon) 12,000 units capsule Take 12,000 units of lipase by mouth 3 (three) times a day as needed for snacks 270 capsule 1     No current facility-administered medications on file prior to visit.         Objective   /84 (BP Location: Left arm, Patient Position: Sitting, Cuff Size: Standard)   Pulse 77   Temp 97.6 °F (36.4 °C) (Tympanic)   Resp 18   Ht 5' 7\" (1.702 m)   Wt 87.1 kg (192 lb)   SpO2 97%   BMI 30.07 kg/m²    "   Physical Exam  General- Well appearing, no acute distress, no asterixis  CV- RRR, no murmur  Pulm- CTA bilaterally  Abdomen- Mild distention, BS active, NTTP  Legs- No LE edema

## 2024-12-30 ENCOUNTER — TELEPHONE (OUTPATIENT)
Dept: FAMILY MEDICINE CLINIC | Facility: CLINIC | Age: 49
End: 2024-12-30

## 2024-12-30 NOTE — TELEPHONE ENCOUNTER
Kirill's Left ankle and calf starting swelling about a week ago.  It is now starting to get discolored and hurting a little bit. She doesn't know if it it hot to touch because he is not there.   He is wearing compression stockings today at work.  She mentioned he can't able to make an appointment until Thursday, but you did not have any appointments available that day.      Please advise if he can wait until Thursday and we can squeeze him in or need to do something sooner.

## 2024-12-30 NOTE — TELEPHONE ENCOUNTER
Informed Geraldine to have him go to the ER for eval.  She said he will either go after work tonight or in the morning.

## 2024-12-31 ENCOUNTER — HOSPITAL ENCOUNTER (EMERGENCY)
Facility: HOSPITAL | Age: 49
Discharge: HOME | End: 2024-12-31
Attending: EMERGENCY MEDICINE
Payer: COMMERCIAL

## 2024-12-31 ENCOUNTER — APPOINTMENT (EMERGENCY)
Dept: RADIOLOGY | Facility: HOSPITAL | Age: 49
End: 2024-12-31
Payer: COMMERCIAL

## 2024-12-31 VITALS
DIASTOLIC BLOOD PRESSURE: 81 MMHG | OXYGEN SATURATION: 97 % | HEART RATE: 73 BPM | RESPIRATION RATE: 18 BRPM | SYSTOLIC BLOOD PRESSURE: 147 MMHG | TEMPERATURE: 98 F

## 2024-12-31 DIAGNOSIS — M79.89 LEFT LEG SWELLING: Primary | ICD-10-CM

## 2024-12-31 LAB
ALBUMIN SERPL BCG-MCNC: 3.6 G/DL (ref 3.5–5)
ALP SERPL-CCNC: 119 U/L (ref 34–104)
ALT SERPL W P-5'-P-CCNC: 44 U/L (ref 7–52)
ANION GAP SERPL CALCULATED.3IONS-SCNC: 12 MMOL/L (ref 4–13)
AST SERPL W P-5'-P-CCNC: 208 U/L (ref 13–39)
BASOPHILS # BLD AUTO: 0.07 THOUSANDS/ΜL (ref 0–0.1)
BASOPHILS NFR BLD AUTO: 2 % (ref 0–1)
BILIRUB SERPL-MCNC: 3.7 MG/DL (ref 0.2–1)
BUN SERPL-MCNC: 7 MG/DL (ref 5–25)
CALCIUM SERPL-MCNC: 8.4 MG/DL (ref 8.4–10.2)
CHLORIDE SERPL-SCNC: 100 MMOL/L (ref 96–108)
CO2 SERPL-SCNC: 26 MMOL/L (ref 21–32)
CREAT SERPL-MCNC: 0.49 MG/DL (ref 0.6–1.3)
D DIMER PPP FEU-MCNC: 3.11 UG/ML FEU
EOSINOPHIL # BLD AUTO: 0.11 THOUSAND/ΜL (ref 0–0.61)
EOSINOPHIL NFR BLD AUTO: 3 % (ref 0–6)
ERYTHROCYTE [DISTWIDTH] IN BLOOD BY AUTOMATED COUNT: 14.5 % (ref 11.6–15.1)
GFR SERPL CREATININE-BSD FRML MDRD: 126 ML/MIN/1.73SQ M
GLUCOSE SERPL-MCNC: 84 MG/DL (ref 65–140)
HCT VFR BLD AUTO: 38.2 % (ref 36.5–49.3)
HGB BLD-MCNC: 12.3 G/DL (ref 12–17)
IMM GRANULOCYTES # BLD AUTO: 0.01 THOUSAND/UL (ref 0–0.2)
IMM GRANULOCYTES NFR BLD AUTO: 0 % (ref 0–2)
LYMPHOCYTES # BLD AUTO: 1.25 THOUSANDS/ΜL (ref 0.6–4.47)
LYMPHOCYTES NFR BLD AUTO: 30 % (ref 14–44)
MACROCYTES BLD QL AUTO: PRESENT
MCH RBC QN AUTO: 33.1 PG (ref 26.8–34.3)
MCHC RBC AUTO-ENTMCNC: 32.2 G/DL (ref 31.4–37.4)
MCV RBC AUTO: 103 FL (ref 82–98)
MONOCYTES # BLD AUTO: 0.69 THOUSAND/ΜL (ref 0.17–1.22)
MONOCYTES NFR BLD AUTO: 17 % (ref 4–12)
NEUTROPHILS # BLD AUTO: 2 THOUSANDS/ΜL (ref 1.85–7.62)
NEUTS SEG NFR BLD AUTO: 48 % (ref 43–75)
NRBC BLD AUTO-RTO: 0 /100 WBCS
PLATELET # BLD AUTO: 43 THOUSANDS/UL (ref 149–390)
PLATELET BLD QL SMEAR: ABNORMAL
PMV BLD AUTO: 11.1 FL (ref 8.9–12.7)
POTASSIUM SERPL-SCNC: 2.9 MMOL/L (ref 3.5–5.3)
PROT SERPL-MCNC: 6.7 G/DL (ref 6.4–8.4)
RBC # BLD AUTO: 3.72 MILLION/UL (ref 3.88–5.62)
RBC MORPH BLD: PRESENT
SODIUM SERPL-SCNC: 138 MMOL/L (ref 135–147)
WBC # BLD AUTO: 4.13 THOUSAND/UL (ref 4.31–10.16)

## 2024-12-31 PROCEDURE — 85379 FIBRIN DEGRADATION QUANT: CPT | Performed by: EMERGENCY MEDICINE

## 2024-12-31 PROCEDURE — 99285 EMERGENCY DEPT VISIT HI MDM: CPT | Performed by: EMERGENCY MEDICINE

## 2024-12-31 PROCEDURE — 85025 COMPLETE CBC W/AUTO DIFF WBC: CPT | Performed by: EMERGENCY MEDICINE

## 2024-12-31 PROCEDURE — 73590 X-RAY EXAM OF LOWER LEG: CPT

## 2024-12-31 PROCEDURE — 36415 COLL VENOUS BLD VENIPUNCTURE: CPT | Performed by: EMERGENCY MEDICINE

## 2024-12-31 PROCEDURE — 99283 EMERGENCY DEPT VISIT LOW MDM: CPT

## 2024-12-31 PROCEDURE — 73630 X-RAY EXAM OF FOOT: CPT

## 2024-12-31 PROCEDURE — 80053 COMPREHEN METABOLIC PANEL: CPT | Performed by: EMERGENCY MEDICINE

## 2024-12-31 PROCEDURE — 96372 THER/PROPH/DIAG INJ SC/IM: CPT

## 2024-12-31 RX ORDER — POTASSIUM CHLORIDE 1500 MG/1
40 TABLET, EXTENDED RELEASE ORAL ONCE
Status: COMPLETED | OUTPATIENT
Start: 2024-12-31 | End: 2024-12-31

## 2024-12-31 RX ORDER — ENOXAPARIN SODIUM 150 MG/ML
1.5 INJECTION SUBCUTANEOUS ONCE
Status: COMPLETED | OUTPATIENT
Start: 2024-12-31 | End: 2024-12-31

## 2024-12-31 RX ADMIN — ENOXAPARIN SODIUM 135 MG: 150 INJECTION SUBCUTANEOUS at 03:10

## 2024-12-31 RX ADMIN — POTASSIUM CHLORIDE 40 MEQ: 1500 TABLET, EXTENDED RELEASE ORAL at 03:10

## 2024-12-31 NOTE — DISCHARGE INSTRUCTIONS
Please call ultrasound later this morning to schedule an appointment to check for a clot. It is very important to follow up for this. Return to ED for any trouble breathing or chest pain    Eat foods rich in potassium such as potatoes, leafy greens, citrus fruits or avocado.

## 2024-12-31 NOTE — ED PROVIDER NOTES
Time reflects when diagnosis was documented in both MDM as applicable and the Disposition within this note       Time User Action Codes Description Comment    12/31/2024  3:07 AM Diamond Naomi Add [M79.89] Left leg swelling           ED Disposition       ED Disposition   Discharge    Condition   Stable    Date/Time   Tue Dec 31, 2024  3:07 AM    Comment   Ashwin Wright discharge to home/self care.                   Assessment & Plan       Medical Decision Making  Amount and/or Complexity of Data Reviewed  Labs: ordered.  Radiology: ordered.    Risk  Prescription drug management.      No venous duplex available overnight. Ddimer elevated, will give dose of lovenox and order for outpatient venous duplex later today. XR no acute fracture or dislocation per my read. Chronic hypokalemia per patient.       Medications   potassium chloride (Klor-Con M20) CR tablet 40 mEq (40 mEq Oral Given 12/31/24 0310)   enoxaparin (LOVENOX) subcutaneous injection 135 mg (135 mg Subcutaneous Given 12/31/24 0310)       ED Risk Strat Scores                          SBIRT 20yo+      Flowsheet Row Most Recent Value   Initial Alcohol Screen: US AUDIT-C     1. How often do you have a drink containing alcohol? 0 Filed at: 12/31/2024 0109   2. How many drinks containing alcohol do you have on a typical day you are drinking?  0 Filed at: 12/31/2024 0109   3a. Male UNDER 65: How often do you have five or more drinks on one occasion? 0 Filed at: 12/31/2024 0109   Audit-C Score 0 Filed at: 12/31/2024 0109   EUSEBIO: How many times in the past year have you...    Used an illegal drug or used a prescription medication for non-medical reasons? Never Filed at: 12/31/2024 0109                            History of Present Illness       Chief Complaint   Patient presents with    Foot Pain     Left foot swelling,  left leg pain x4 days. Radiating from foot to groin. Pain 5/10.  No OTC. Hx Left knee surgery. No know injury  drives truck for living. Wearing  compression, when remove swelling returns        Past Medical History:   Diagnosis Date    Arthritis     Asthma     Chronic pain disorder     Chronic pancreatitis (HCC)     Cirrhosis (HCC)     early cirrhosis    GERD (gastroesophageal reflux disease)     History of COVID-19 01/2022    mild s/s    Hyperlipidemia     Hypertension     Liver abscess     Liver disease     Meniscus tear     left knee work injury last assessed 08/24/2016    Pancreatitis     Pneumonia     Rotator cuff tear     Stomach disorder     Chronic gastritus      Past Surgical History:   Procedure Laterality Date    CELIAC PLEXUS BLOCK Bilateral 10/29/2018    Procedure: SPLANCHNIC NERVE BLOCK;  Surgeon: Ramiro Chinchilla MD;  [facility];  Service: Pain Management     CELIAC PLEXUS BLOCK Bilateral 01/10/2019    Procedure: SPLANCHNIC NERVE BLOCK;  Surgeon: Ramiro Chinchilla MD;  [facility];  Service: Pain Management     CHOLECYSTECTOMY      COLONOSCOPY      ESOPHAGOGASTRODUODENOSCOPY N/A 11/16/2017    Procedure: ESOPHAGOGASTRODUODENOSCOPY (EGD);  Surgeon: Ever Bonner MD;  [facility]: [facility];  Service: Gastroenterology    ESOPHAGOGASTRODUODENOSCOPY N/A 04/19/2018    Procedure: ESOPHAGOGASTRODUODENOSCOPY (EGD);  Surgeon: Orestes Forrest MD;  [facility];  Service: Gastroenterology    ESOPHAGOGASTRODUODENOSCOPY N/A 05/10/2018    Procedure: ESOPHAGOGASTRODUODENOSCOPY (EGD);  Surgeon: Vaibhav Matthew MD;  [facility];  Service: Gastroenterology    HERNIA REPAIR Bilateral 02/03/2023    Procedure: REPAIR HERNIA INGUINAL, LAPAROSCOPIC,;  Surgeon: Juanjo Travis DO;  [facility];  Service: General    IR TEMPORARY DIALYSIS CATHETER PLACEMENT  02/28/2019    KNEE ARTHROSCOPY Bilateral     KNEE ARTHROSCOPY Right 2009    cibishino last assessed 08/24/2016    KNEE ARTHROSCOPY Right 07/01/2019    LIVER SURGERY      NERVE BLOCK Bilateral 05/29/2018    Procedure: SPLANCHNIC NERVE BLOCK;  Surgeon: Ramiro Chinchilla MD;   [facility];  Service: Pain Management     PANCREAS SURGERY      stents    PANCREATIC CYST EXCISION      CT INJECTION ANES LMBR/THRC PARAVERTBRL SYMPATHETIC Bilateral 12/28/2017    Procedure: SPLANCHNIC NERVE BLOCK;  Surgeon: Ramiro Chinchilla MD;  [facility];  Service: Pain Management     CT INJX ANES CELIAC PLEXUS W/WO RADIOLOGIC MONITRNG Bilateral 05/09/2017    Procedure: CELIAC PLEXUS BLOCK ;  Surgeon: Ramiro Chinchilla MD;  [facility];  Service: Pain Management     CT INJX ANES CELIAC PLEXUS W/WO RADIOLOGIC MONITRNG Bilateral 06/01/2017    Procedure: SPLANCHNIC NERVE BLOCK at T12;  Surgeon: Ramiro Chinchilla MD;  [facility];  Service: Pain Management     CT INJX ANES CELIAC PLEXUS W/WO RADIOLOGIC MONITRNG Bilateral 08/08/2017    Procedure: BILATERAL SPLANCHNIC NERVE BLOCK T12;  Surgeon: Ramiro Chinchilla MD;  [facility];  Service: Pain Management     CT INJX ANES CELIAC PLEXUS W/WO RADIOLOGIC MONITRNG Bilateral 04/18/2019    Procedure: BLOCK / INJECTION CELIAC PLEXUS;  Surgeon: Ramiro Chinchilla MD;  [facility];  Service: Pain Management     CT INJX ANES CELIAC PLEXUS W/WO RADIOLOGIC MONITRNG Bilateral 08/20/2019    Procedure: SPLANCHNIC NERVE BLOCK;  Surgeon: Ramiro Chinchilla MD;  [facility];  Service: Pain Management     CT INJX ANES CELIAC PLEXUS W/WO RADIOLOGIC MONITRNG Bilateral 10/17/2019    Procedure: SPLANCHNIC NERVE BLOCK;  Surgeon: Ramiro Chinchilla MD;  [facility];  Service: Pain Management     CT LAPAROSCOPY SURG CHOLECYSTECTOMY N/A 02/14/2017    Procedure: LAPAROSCOPIC CHOLECYSTECTOMY, IOC, POSSIBLE OPEN.;  Surgeon: Suresh Lang MD;  [facility];  Service: General    CT SURGICAL ARTHROSCOPY SHOULDER W/ROTATOR CUFF RPR Left 06/09/2023    Procedure: Left Shoulder Arthroscopic Rotator Cuff Repair, Open Subpectoral Biceps Tenodesis, Acromioplasty, Extensive Debridement;  Surgeon: Toro Healy DO;  [facility];   Service: Orthopedics    ROTATOR CUFF REPAIR Right     SHOULDER ARTHROSCOPY      SHOULDER ARTHROSCOPY Right     SHOULDER SURGERY      TENDON REPAIR  2015    Right shoulder      Family History   Problem Relation Age of Onset    Cirrhosis Mother     Heart disease Other         cardiac disorder    Cancer Other       Social History     Tobacco Use    Smoking status: Former     Current packs/day: 0.00     Average packs/day: 1 pack/day for 20.0 years (20.0 ttl pk-yrs)     Types: Cigarettes     Start date: 3/15/2001     Quit date: 3/15/2021     Years since quitting: 3.8    Smokeless tobacco: Never   Vaping Use    Vaping status: Some Days    Substances: Nicotine   Substance Use Topics    Alcohol use: Yes     Alcohol/week: 1.0 standard drink of alcohol     Types: 1 Cans of beer per week     Comment: on weekends    Drug use: No      E-Cigarette/Vaping    E-Cigarette Use Current Some Day User       E-Cigarette/Vaping Substances    Nicotine Yes     THC No     CBD No     Flavoring No     Other No     Unknown No       I have reviewed and agree with the history as documented.     HPI  48 yo M presents with left leg swelling and pain for the past 4 days. He is a . Denies any trauma. Thought that pain was related to compression stockings. Denies chest pain or shortness of breath.  Review of Systems   Constitutional:  Negative for chills and fever.   HENT:  Negative for dental problem and ear pain.    Eyes:  Negative for pain and redness.   Respiratory:  Negative for cough and shortness of breath.    Cardiovascular:  Positive for leg swelling. Negative for chest pain and palpitations.   Gastrointestinal:  Negative for abdominal pain and nausea.   Endocrine: Negative for polydipsia and polyphagia.   Genitourinary:  Negative for dysuria and frequency.   Musculoskeletal:  Negative for arthralgias and joint swelling.   Skin:  Negative for color change and rash.   Neurological:  Negative for dizziness and headaches.    Psychiatric/Behavioral:  Negative for behavioral problems and confusion.    All other systems reviewed and are negative.          Objective       ED Triage Vitals [12/31/24 0102]   Temperature Pulse Blood Pressure Respirations SpO2 Patient Position - Orthostatic VS   98 °F (36.7 °C) 73 147/81 18 97 % Sitting      Temp Source Heart Rate Source BP Location FiO2 (%) Pain Score    Oral Monitor Left arm -- 5      Vitals      Date and Time Temp Pulse SpO2 Resp BP Pain Score FACES Pain Rating User   12/31/24 0102 98 °F (36.7 °C) 73 97 % 18 147/81 5 -- SREE            Physical Exam  Constitutional:       General: He is not in acute distress.     Appearance: He is well-developed. He is not diaphoretic.   HENT:      Head: Normocephalic and atraumatic.   Eyes:      Conjunctiva/sclera: Conjunctivae normal.      Pupils: Pupils are equal, round, and reactive to light.   Neck:      Vascular: No JVD.      Trachea: No tracheal deviation.   Cardiovascular:      Rate and Rhythm: Normal rate.   Pulmonary:      Effort: Pulmonary effort is normal.   Musculoskeletal:      Cervical back: Normal range of motion and neck supple.      Comments: LLE with mild edema, bruising of toes, DP pulse 2+   Skin:     General: Skin is warm and dry.         Results Reviewed       Procedure Component Value Units Date/Time    CBC and differential [[id number]]  (Abnormal) Collected: 12/31/24 0152    Lab Status: Final result Specimen: Blood from Arm, Right Updated: 12/31/24 0258     WBC 4.13 Thousand/uL      RBC 3.72 Million/uL      Hemoglobin 12.3 g/dL      Hematocrit 38.2 %       fL      MCH 33.1 pg      MCHC 32.2 g/dL      RDW 14.5 %      MPV 11.1 fL      Platelets 43 Thousands/uL      nRBC 0 /100 WBCs      Segmented % 48 %      Immature Grans % 0 %      Lymphocytes % 30 %      Monocytes % 17 %      Eosinophils Relative 3 %      Basophils Relative 2 %      Absolute Neutrophils 2.00 Thousands/µL      Absolute Immature Grans 0.01 Thousand/uL       Absolute Lymphocytes 1.25 Thousands/µL      Absolute Monocytes 0.69 Thousand/µL      Eosinophils Absolute 0.11 Thousand/µL      Basophils Absolute 0.07 Thousands/µL     Narrative:      This is an appended report.  These results have been appended to a previously verified report.    Smear Review(Phlebs Do Not Order) [[id number]]  (Abnormal) Collected: 12/31/24 0152    Lab Status: Final result Specimen: Blood from Arm, Right Updated: 12/31/24 0258     RBC Morphology Present     Platelet Estimate Decreased     Macrocytes Present    D-dimer, quantitative [[id number]]  (Abnormal) Collected: 12/31/24 0152    Lab Status: Final result Specimen: Blood from Arm, Right Updated: 12/31/24 0216     D-Dimer, Quant 3.11 ug/ml FEU     Narrative:      In the evaluation for possible pulmonary embolism, in the appropriate (Well's Score of 4 or less) patient, the age adjusted d-dimer cutoff for this patient can be calculated as:    Age x 0.01 (in ug/mL) for Age-adjusted D-dimer exclusion threshold for a patient over 50 years.    Comprehensive metabolic panel [[id number]]  (Abnormal) Collected: 12/31/24 0152    Lab Status: Final result Specimen: Blood from Arm, Right Updated: 12/31/24 0211     Sodium 138 mmol/L      Potassium 2.9 mmol/L      Chloride 100 mmol/L      CO2 26 mmol/L      ANION GAP 12 mmol/L      BUN 7 mg/dL      Creatinine 0.49 mg/dL      Glucose 84 mg/dL      Calcium 8.4 mg/dL       U/L      ALT 44 U/L      Alkaline Phosphatase 119 U/L      Total Protein 6.7 g/dL      Albumin 3.6 g/dL      Total Bilirubin 3.70 mg/dL      eGFR 126 ml/min/1.73sq m     Narrative:      [employer] guidelines for Chronic Kidney Disease (CKD):     Stage 1 with normal or high GFR (GFR > 90 mL/min/1.73 square meters)    Stage 2 Mild CKD (GFR = 60-89 mL/min/1.73 square meters)    Stage 3A Moderate CKD (GFR = 45-59 mL/min/1.73 square meters)    Stage 3B Moderate CKD (GFR = 30-44 mL/min/1.73 square meters)    Stage 4 Severe  CKD (GFR = 15-29 mL/min/1.73 square meters)    Stage 5 End Stage CKD (GFR <15 mL/min/1.73 square meters)  Note: GFR calculation is accurate only with a steady state creatinine            XR foot 3+ views LEFT   Final Interpretation by Neha Claudio MD (12/31 0832)      No acute osseous abnormality.         Computerized Assisted Algorithm (CAA) may have been used to analyze all applicable images.         Workstation performed: [id number]         XR tibia fibula 2 views LEFT   Final Interpretation by Neha Claudio MD (12/31 0905)      No acute osseous abnormality.            Computerized Assisted Algorithm (CAA) may have been used to analyze all applicable images.               Workstation performed: [id number]         VAS VENOUS DUPLEX -LOWER LIMB UNILATERAL    (Results Pending)       Procedures    ED Medication and Procedure Management   Prior to Admission Medications   Prescriptions Last Dose Informant Patient Reported? Taking?   Choline Fenofibrate (Fenofibric Acid) 135 MG CPDR  Self No No   Sig: TAKE ONE CAPSULE BY MOUTH EVERY DAY   Empagliflozin 10 MG TABS  Self Yes No   Sig: Take 10 mg by mouth every morning   Insulin Pen Needle (Pen Needles) 32G X 5 MM MISC  Self Yes No   Sig: use once daily as directed   Multiple Vitamins-Minerals (MULTIVITAMIN ADULTS PO)  Self Yes No   Sig: Take by mouth daily after breakfast   NON FORMULARY  Self Yes No   Sig: Pantessin 1 capsule daily   Pancrelipase, Lip-Prot-Amyl, (Creon) 53360-589813 units CPEP  Self No No   Sig: TAKE 1 CAPSULE (36,000 UNITS TOTAL) BY MOUTH 3 (THREE) TIMES A DAY BEFORE MEALS   TURMERIC PO  Self Yes No   Sig: Take by mouth in the morning   albuterol (PROVENTIL HFA,VENTOLIN HFA) 90 mcg/act inhaler  Self No No   Sig: INHALE TWO PUFFS BY MOUTH EVERY 6 HOURS AS NEEDED FOR WHEEZING   amLODIPine (NORVASC) 5 mg tablet  Self No No   Sig: Take 1 tablet (5 mg total) by mouth daily   busPIRone (BUSPAR) 5 mg tablet  Self No No   Sig: Take 1 tablet (5 mg  total) by mouth 2 (two) times a day   esomeprazole (NexIUM) 40 MG capsule  Self No No   Sig: Take 1 capsule (40 mg total) by mouth 2 (two) times a day before meals   famotidine (PEPCID) 40 MG tablet  Self Yes No   Sig: Take 40 mg by mouth daily at bedtime as needed    fluticasone (FLONASE) 50 mcg/act nasal spray  Self No No   Si spray into each nostril daily   insulin degludec (Tresiba FlexTouch) 100 units/mL injection pen  Self Yes No   Si units subcut in hs   lisinopril (ZESTRIL) 20 mg tablet  Self No No   Sig: TAKE ONE TABLET BY MOUTH EVERY DAY   mirtazapine (REMERON) 7.5 MG tablet  Self No No   Sig: Take 1 tablet (7.5 mg total) by mouth daily at bedtime   omega-3-acid ethyl esters (LOVAZA) 1 g capsule  Self No No   Sig: TAKE TWO CAPSULES BY MOUTH TWICE DAILY   Patient not taking: Reported on 2024   ondansetron (ZOFRAN-ODT) 4 mg disintegrating tablet  Self No No   Sig: Take 1 tablet (4 mg total) by mouth every 8 (eight) hours as needed for nausea or vomiting   pancrelipase, Lip-Prot-Amyl, (Creon) 12,000 units capsule  Self No No   Sig: Take 12,000 units of lipase by mouth 3 (three) times a day as needed for snacks   propranolol (INDERAL) 20 mg tablet  Self No No   Sig: TAKE 1 TABLET BY MOUTH TWICE DAILY   rosuvastatin (CRESTOR) 40 MG tablet  Self No No   Sig: TAKE ONE TABLET BY MOUTH EVERY DAY      Facility-Administered Medications: None     Discharge Medication List as of 2024  3:10 AM        CONTINUE these medications which have NOT CHANGED    Details   albuterol (PROVENTIL HFA,VENTOLIN HFA) 90 mcg/act inhaler INHALE TWO PUFFS BY MOUTH EVERY 6 HOURS AS NEEDED FOR WHEEZING, Normal      amLODIPine (NORVASC) 5 mg tablet Take 1 tablet (5 mg total) by mouth daily, Starting 2024, Normal      busPIRone (BUSPAR) 5 mg tablet Take 1 tablet (5 mg total) by mouth 2 (two) times a day, Starting 2024, Normal      Choline Fenofibrate (Fenofibric Acid) 135 MG CPDR TAKE ONE CAPSULE BY MOUTH  EVERY DAY, Starting Sat 6/22/2024, Normal      Empagliflozin 10 MG TABS Take 10 mg by mouth every morning, Historical Med      esomeprazole (NexIUM) 40 MG capsule Take 1 capsule (40 mg total) by mouth 2 (two) times a day before meals, Starting Tue 11/19/2024, Normal      famotidine (PEPCID) 40 MG tablet Take 40 mg by mouth daily at bedtime as needed , Historical Med      fluticasone (FLONASE) 50 mcg/act nasal spray 1 spray into each nostril daily, Starting Mon 2/5/2024, Normal      insulin degludec (Tresiba FlexTouch) 100 units/mL injection pen 20 units subcut in hs, Historical Med      Insulin Pen Needle (Pen Needles) 32G X 5 MM MISC use once daily as directed, Historical Med      lisinopril (ZESTRIL) 20 mg tablet TAKE ONE TABLET BY MOUTH EVERY DAY, Starting Sun 8/11/2024, Normal      mirtazapine (REMERON) 7.5 MG tablet Take 1 tablet (7.5 mg total) by mouth daily at bedtime, Starting Wed 12/4/2024, Normal      Multiple Vitamins-Minerals (MULTIVITAMIN ADULTS PO) Take by mouth daily after breakfast, Historical Med      NON FORMULARY Pantessin 1 capsule daily, Historical Med      omega-3-acid ethyl esters (LOVAZA) 1 g capsule TAKE TWO CAPSULES BY MOUTH TWICE DAILY, Normal      ondansetron (ZOFRAN-ODT) 4 mg disintegrating tablet Take 1 tablet (4 mg total) by mouth every 8 (eight) hours as needed for nausea or vomiting, Starting Wed 10/30/2024, Normal      pancrelipase, Lip-Prot-Amyl, (Creon) 12,000 units capsule Take 12,000 units of lipase by mouth 3 (three) times a day as needed for snacks, Starting Tue 6/25/2024, Until Mon 9/30/2024 at 2359, Normal      Pancrelipase, Lip-Prot-Amyl, (Creon) 07463-531079 units CPEP TAKE 1 CAPSULE (36,000 UNITS TOTAL) BY MOUTH 3 (THREE) TIMES A DAY BEFORE MEALS, Starting Tue 9/10/2024, Normal      propranolol (INDERAL) 20 mg tablet TAKE 1 TABLET BY MOUTH TWICE DAILY, Normal      rosuvastatin (CRESTOR) 40 MG tablet TAKE ONE TABLET BY MOUTH EVERY DAY, Starting Fri 7/5/2024, Normal       TURMERIC PO Take by mouth in the morning, Historical Med           Outpatient Discharge Orders   VAS VENOUS DUPLEX -LOWER LIMB UNILATERAL   Standing Status: Future Standing Exp. Date: 12/31/28     ED SEPSIS DOCUMENTATION   Time reflects when diagnosis was documented in both MDM as applicable and the Disposition within this note       Time User Action Codes Description Comment    12/31/2024  3:07 AM Naomi Diamond Add [M79.89] Left leg swelling                  Naomi Diamond MD  01/02/25 [id number]

## 2025-01-01 ENCOUNTER — APPOINTMENT (EMERGENCY)
Dept: CT IMAGING | Facility: HOSPITAL | Age: 50
End: 2025-01-01
Payer: COMMERCIAL

## 2025-01-01 ENCOUNTER — HOSPITAL ENCOUNTER (EMERGENCY)
Facility: HOSPITAL | Age: 50
Discharge: HOME | End: 2025-01-01
Payer: COMMERCIAL

## 2025-01-01 ENCOUNTER — APPOINTMENT (EMERGENCY)
Dept: VASCULAR ULTRASOUND | Facility: HOSPITAL | Age: 50
End: 2025-01-01
Payer: COMMERCIAL

## 2025-01-01 VITALS
OXYGEN SATURATION: 95 % | DIASTOLIC BLOOD PRESSURE: 102 MMHG | BODY MASS INDEX: 30.13 KG/M2 | WEIGHT: 192 LBS | RESPIRATION RATE: 18 BRPM | TEMPERATURE: 98 F | HEART RATE: 66 BPM | HEIGHT: 67 IN | SYSTOLIC BLOOD PRESSURE: 139 MMHG

## 2025-01-01 DIAGNOSIS — D69.6 THROMBOCYTOPENIA (HCC): Primary | ICD-10-CM

## 2025-01-01 LAB
ALBUMIN SERPL BCG-MCNC: 3.3 G/DL (ref 3.5–5)
ALP SERPL-CCNC: 146 U/L (ref 34–104)
ALT SERPL W P-5'-P-CCNC: 35 U/L (ref 7–52)
ANION GAP SERPL CALCULATED.3IONS-SCNC: 10 MMOL/L (ref 4–13)
APTT PPP: 33 SECONDS (ref 23–34)
AST SERPL W P-5'-P-CCNC: 168 U/L (ref 13–39)
BASOPHILS # BLD AUTO: 0.06 THOUSANDS/ΜL (ref 0–0.1)
BASOPHILS NFR BLD AUTO: 2 % (ref 0–1)
BILIRUB SERPL-MCNC: 2.4 MG/DL (ref 0.2–1)
BUN SERPL-MCNC: 5 MG/DL (ref 5–25)
CALCIUM ALBUM COR SERPL-MCNC: 8.9 MG/DL (ref 8.3–10.1)
CALCIUM SERPL-MCNC: 8.3 MG/DL (ref 8.4–10.2)
CHLORIDE SERPL-SCNC: 103 MMOL/L (ref 96–108)
CO2 SERPL-SCNC: 25 MMOL/L (ref 21–32)
CREAT SERPL-MCNC: 0.33 MG/DL (ref 0.6–1.3)
EOSINOPHIL # BLD AUTO: 0.11 THOUSAND/ΜL (ref 0–0.61)
EOSINOPHIL NFR BLD AUTO: 3 % (ref 0–6)
ERYTHROCYTE [DISTWIDTH] IN BLOOD BY AUTOMATED COUNT: 14.4 % (ref 11.6–15.1)
GFR SERPL CREATININE-BSD FRML MDRD: 148 ML/MIN/1.73SQ M
GLUCOSE SERPL-MCNC: 93 MG/DL (ref 65–140)
HCT VFR BLD AUTO: 35.3 % (ref 36.5–49.3)
HGB BLD-MCNC: 11.6 G/DL (ref 12–17)
IMM GRANULOCYTES # BLD AUTO: 0.01 THOUSAND/UL (ref 0–0.2)
IMM GRANULOCYTES NFR BLD AUTO: 0 % (ref 0–2)
INR PPP: 1.15 (ref 0.85–1.19)
LYMPHOCYTES # BLD AUTO: 1.39 THOUSANDS/ΜL (ref 0.6–4.47)
LYMPHOCYTES NFR BLD AUTO: 36 % (ref 14–44)
MCH RBC QN AUTO: 33.2 PG (ref 26.8–34.3)
MCHC RBC AUTO-ENTMCNC: 32.9 G/DL (ref 31.4–37.4)
MCV RBC AUTO: 101 FL (ref 82–98)
MONOCYTES # BLD AUTO: 0.65 THOUSAND/ΜL (ref 0.17–1.22)
MONOCYTES NFR BLD AUTO: 17 % (ref 4–12)
NEUTROPHILS # BLD AUTO: 1.69 THOUSANDS/ΜL (ref 1.85–7.62)
NEUTS SEG NFR BLD AUTO: 42 % (ref 43–75)
NRBC BLD AUTO-RTO: 0 /100 WBCS
PLATELET # BLD AUTO: 50 THOUSANDS/UL (ref 149–390)
PMV BLD AUTO: 11 FL (ref 8.9–12.7)
POTASSIUM SERPL-SCNC: 3.1 MMOL/L (ref 3.5–5.3)
PROT SERPL-MCNC: 6.2 G/DL (ref 6.4–8.4)
PROTHROMBIN TIME: 15.4 SECONDS (ref 12.3–15)
RBC # BLD AUTO: 3.49 MILLION/UL (ref 3.88–5.62)
SODIUM SERPL-SCNC: 138 MMOL/L (ref 135–147)
WBC # BLD AUTO: 3.91 THOUSAND/UL (ref 4.31–10.16)

## 2025-01-01 PROCEDURE — 93971 EXTREMITY STUDY: CPT

## 2025-01-01 PROCEDURE — 85610 PROTHROMBIN TIME: CPT

## 2025-01-01 PROCEDURE — 93971 EXTREMITY STUDY: CPT | Performed by: SURGERY

## 2025-01-01 PROCEDURE — 80053 COMPREHEN METABOLIC PANEL: CPT

## 2025-01-01 PROCEDURE — 99284 EMERGENCY DEPT VISIT MOD MDM: CPT

## 2025-01-01 PROCEDURE — 85730 THROMBOPLASTIN TIME PARTIAL: CPT

## 2025-01-01 PROCEDURE — 73706 CT ANGIO LWR EXTR W/O&W/DYE: CPT

## 2025-01-01 PROCEDURE — 85025 COMPLETE CBC W/AUTO DIFF WBC: CPT

## 2025-01-01 PROCEDURE — 99285 EMERGENCY DEPT VISIT HI MDM: CPT

## 2025-01-01 PROCEDURE — 36415 COLL VENOUS BLD VENIPUNCTURE: CPT

## 2025-01-01 RX ORDER — POTASSIUM CHLORIDE 1500 MG/1
20 TABLET, EXTENDED RELEASE ORAL ONCE
Status: COMPLETED | OUTPATIENT
Start: 2025-01-01 | End: 2025-01-01

## 2025-01-01 RX ORDER — PREDNISONE 10 MG/1
TABLET ORAL
Qty: 30 TABLET | Refills: 0 | Status: SHIPPED | OUTPATIENT
Start: 2025-01-01 | End: 2025-01-11

## 2025-01-01 RX ORDER — PREDNISONE 10 MG/1
TABLET ORAL
Qty: 30 TABLET | Refills: 0 | Status: SHIPPED | OUTPATIENT
Start: 2025-01-01 | End: 2025-01-01

## 2025-01-01 RX ADMIN — IOHEXOL 100 ML: 350 INJECTION, SOLUTION INTRAVENOUS at 12:12

## 2025-01-01 RX ADMIN — POTASSIUM CHLORIDE 20 MEQ: 1500 TABLET, EXTENDED RELEASE ORAL at 11:24

## 2025-01-01 NOTE — Clinical Note
Ashwin Wright was seen and treated in our emergency department on 1/1/2025.                Diagnosis:     Ashwin  may return to work on return date.    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      Destiny Luevano, MAME    ______________________________           _______________          _______________  Hospital Representative                              Date                                Time

## 2025-01-01 NOTE — ED PROVIDER NOTES
ED Disposition       None          Assessment & Plan   {Hyperlinks  Risk Stratification - NIHSS - HEART SCORE - Fill out sepsis note and make sure you call [phone] if severe or septic shock:[id number]}    Medical Decision Making  49y M p/w lower extremity swelling    Ddx includes DVT, contusion, coagulopathy    Doubt fracture/dislocation given recent imaging    Plan: bloodwork, US    Amount and/or Complexity of Data Reviewed  Labs: ordered.             Medications - No data to display    ED Risk Strat Scores                                              History of Present Illness   {Hyperlinks  History (Med, Surg, Fam, Social) - Current Medications - Allergies  :[id number]}    Chief Complaint   Patient presents with    Leg Pain     C/o left leg pain x 6 days, was seen here for the same 3 days ago.        Past Medical History:   Diagnosis Date    Arthritis     Asthma     Chronic pain disorder     Chronic pancreatitis (HCC)     Cirrhosis (HCC)     early cirrhosis    GERD (gastroesophageal reflux disease)     History of COVID-19 01/2022    mild s/s    Hyperlipidemia     Hypertension     Liver abscess     Liver disease     Meniscus tear     left knee work injury last assessed 08/24/2016    Pancreatitis     Pneumonia     Rotator cuff tear     Stomach disorder     Chronic gastritus      Past Surgical History:   Procedure Laterality Date    CELIAC PLEXUS BLOCK Bilateral 10/29/2018    Procedure: SPLANCHNIC NERVE BLOCK;  Surgeon: Ramiro Chinchilla MD;  [facility];  Service: Pain Management     CELIAC PLEXUS BLOCK Bilateral 01/10/2019    Procedure: SPLANCHNIC NERVE BLOCK;  Surgeon: Ramiro Chinchilla MD;  [facility];  Service: Pain Management     CHOLECYSTECTOMY      COLONOSCOPY      ESOPHAGOGASTRODUODENOSCOPY N/A 11/16/2017    Procedure: ESOPHAGOGASTRODUODENOSCOPY (EGD);  Surgeon: Ever Bonner MD;  [facility];  Service: Gastroenterology    ESOPHAGOGASTRODUODENOSCOPY N/A 04/19/2018    Procedure:  ESOPHAGOGASTRODUODENOSCOPY (EGD);  Surgeon: Orestes Forrest MD;  [facility];  Service: Gastroenterology    ESOPHAGOGASTRODUODENOSCOPY N/A 05/10/2018    Procedure: ESOPHAGOGASTRODUODENOSCOPY (EGD);  Surgeon: Vaibhav Matthew MD;  [facility];  Service: Gastroenterology    HERNIA REPAIR Bilateral 02/03/2023    Procedure: REPAIR HERNIA INGUINAL, LAPAROSCOPIC,;  Surgeon: Juanjo Travis DO;  [facility];  Service: General    IR TEMPORARY DIALYSIS CATHETER PLACEMENT  02/28/2019    KNEE ARTHROSCOPY Bilateral     KNEE ARTHROSCOPY Right 2009    cibishino last assessed 08/24/2016    KNEE ARTHROSCOPY Right 07/01/2019    LIVER SURGERY      NERVE BLOCK Bilateral 05/29/2018    Procedure: SPLANCHNIC NERVE BLOCK;  Surgeon: Ramiro Chinchilla MD;  [facility];  Service: Pain Management     PANCREAS SURGERY      stents    PANCREATIC CYST EXCISION      OH INJECTION ANES LMBR/THRC PARAVERTBRL SYMPATHETIC Bilateral 12/28/2017    Procedure: SPLANCHNIC NERVE BLOCK;  Surgeon: Ramiro Chinchilla MD;  [facility];  Service: Pain Management     OH INJX ANES CELIAC PLEXUS W/WO RADIOLOGIC MONITRNG Bilateral 05/09/2017    Procedure: CELIAC PLEXUS BLOCK ;  Surgeon: Ramiro Chinchilla MD;  [facility];  Service: Pain Management     OH INJX ANES CELIAC PLEXUS W/WO RADIOLOGIC MONITRNG Bilateral 06/01/2017    Procedure: SPLANCHNIC NERVE BLOCK at T12;  Surgeon: Ramiro Chinchilla MD;  [facility];  Service: Pain Management     OH INJX ANES CELIAC PLEXUS W/WO RADIOLOGIC MONITRNG Bilateral 08/08/2017    Procedure: BILATERAL SPLANCHNIC NERVE BLOCK T12;  Surgeon: Ramiro Chinchilla MD;  [facility];  Service: Pain Management     OH INJX ANES CELIAC PLEXUS W/WO RADIOLOGIC MONITRNG Bilateral 04/18/2019    Procedure: BLOCK / INJECTION CELIAC PLEXUS;  Surgeon: Ramiro Chinchilla MD;  [facility];  Service: Pain Management     OH INJX ANES CELIAC PLEXUS W/WO RADIOLOGIC MONITRNG Bilateral 08/20/2019     Procedure: SPLANCHNIC NERVE BLOCK;  Surgeon: Ramiro Chinchilla MD;  [facility];  Service: Pain Management     CO INJX ANES CELIAC PLEXUS W/WO RADIOLOGIC MONITRNG Bilateral 10/17/2019    Procedure: SPLANCHNIC NERVE BLOCK;  Surgeon: Ramiro Chinchilla MD;  [facility];  Service: Pain Management     CO LAPAROSCOPY SURG CHOLECYSTECTOMY N/A 02/14/2017    Procedure: LAPAROSCOPIC CHOLECYSTECTOMY, IOC, POSSIBLE OPEN.;  Surgeon: Suresh Lang MD;  [facility];  Service: General    CO SURGICAL ARTHROSCOPY SHOULDER W/ROTATOR CUFF RPR Left 06/09/2023    Procedure: Left Shoulder Arthroscopic Rotator Cuff Repair, Open Subpectoral Biceps Tenodesis, Acromioplasty, Extensive Debridement;  Surgeon: Toro Healy DO;  [facility];  Service: Orthopedics    ROTATOR CUFF REPAIR Right     SHOULDER ARTHROSCOPY      SHOULDER ARTHROSCOPY Right     SHOULDER SURGERY      TENDON REPAIR  2015    Right shoulder      Family History   Problem Relation Age of Onset    Cirrhosis Mother     Heart disease Other         cardiac disorder    Cancer Other       Social History     Tobacco Use    Smoking status: Former     Current packs/day: 0.00     Average packs/day: 1 pack/day for 20.0 years (20.0 ttl pk-yrs)     Types: Cigarettes     Start date: 3/15/2001     Quit date: 3/15/2021     Years since quitting: 3.8    Smokeless tobacco: Never   Vaping Use    Vaping status: Some Days    Substances: Nicotine   Substance Use Topics    Alcohol use: Yes     Alcohol/week: 1.0 standard drink of alcohol     Types: 1 Cans of beer per week     Comment: on weekends    Drug use: No      E-Cigarette/Vaping    E-Cigarette Use Current Some Day User       E-Cigarette/Vaping Substances    Nicotine Yes     THC No     CBD No     Flavoring No     Other No     Unknown No       I have reviewed and agree with the history as documented.     Patient is a 49-year-old male with a past medical history significant for asthma, chronic pain, GERD,  dyslipidemia, hypertension presenting to the emergency department for evaluation of lower extremity swelling.  Notably patient was seen yesterday where he had a markedly elevated D-dimer.  He received a single dose of Lovenox and was instructed to receive a left lower extremity duplex.  Patient states it is scheduled for tomorrow however this morning he noted significant increase in his lower extremity swelling.  He notes lower extremity swelling x 2 weeks however reports bruising over the left lower extremity x 3 days.  He initially thought it was compression stockings that caused it.  He does work as a .  He denies history of DVT/PE.  Denies any surgical history recently or traumatic injury.  Does note a distant history of right knee surgery.  Denies fevers or chills.  Denies any numbness.  Denies chest pain or shortness of breath.  Denies dyspnea on exertion. Pt reports he is presenting today because his ankle became more swollen and painful and he is concerned.        Review of Systems   Constitutional:  Negative for chills and fever.   HENT:  Negative for ear pain and sore throat.    Eyes:  Negative for pain and visual disturbance.   Respiratory:  Negative for cough and shortness of breath.    Cardiovascular:  Positive for leg swelling. Negative for chest pain and palpitations.   Gastrointestinal:  Negative for abdominal pain and vomiting.   Genitourinary:  Negative for dysuria and hematuria.   Musculoskeletal:  Negative for arthralgias and back pain.   Skin:  Negative for color change and rash.   Neurological:  Negative for seizures and syncope.   All other systems reviewed and are negative.          Objective   {Hyperlinks  Historical Vitals - Historical Labs - Chart Review/Microbiology - Last Echo - Code Status  :[id number]}    ED Triage Vitals [01/01/25 0857]   Temperature Pulse Blood Pressure Respirations SpO2 Patient Position - Orthostatic VS   98 °F (36.7 °C) 77 112/53 18 97 % Sitting       Temp Source Heart Rate Source BP Location FiO2 (%) Pain Score    Temporal Monitor Left arm -- --      Vitals      Date and Time Temp Pulse SpO2 Resp BP Pain Score FACES Pain Rating User   01/01/25 0857 98 °F (36.7 °C) 77 97 % 18 112/53 -- --             Physical Exam  Vitals and nursing note reviewed.   Constitutional:       General: He is not in acute distress.     Appearance: Normal appearance. He is not ill-appearing, toxic-appearing or diaphoretic.      Comments: Patient sitting in bed in no acute distress, no respiratory distress.   HENT:      Head: Normocephalic and atraumatic.   Eyes:      General: No scleral icterus.        Right eye: No discharge.         Left eye: No discharge.      Extraocular Movements: Extraocular movements intact.      Conjunctiva/sclera: Conjunctivae normal.   Cardiovascular:      Rate and Rhythm: Normal rate.      Pulses: Normal pulses.      Heart sounds: Normal heart sounds. No murmur heard.     No friction rub. No gallop.   Pulmonary:      Effort: Pulmonary effort is normal. No respiratory distress.      Breath sounds: Normal breath sounds. No stridor. No wheezing, rhonchi or rales.      Comments: No rales, wheezing, rhonchi, no respiratory distress.  Abdominal:      General: Abdomen is flat. Bowel sounds are normal. There is no distension.      Palpations: Abdomen is soft.      Tenderness: There is no abdominal tenderness. There is no guarding or rebound.      Comments: Abdomen soft, nontender to palpation.  No rigidity, guarding, rebound   Musculoskeletal:         General: Tenderness present. No swelling. Normal range of motion.      Cervical back: Normal range of motion. No rigidity.      Right lower leg: No edema.      Left lower leg: Edema present.      Comments: Left lower extremity swelling and mild dark discoloration.  Swelling and tenderness appreciated of foot and ankle up through mid tibia.  Normal sensation of foot, all 5 digits.  Able to range toes.  Difficulty  with plantar, dorsiflexion.  2+ dorsalis pedis pulse.  No pain on palpation of knee.   Skin:     General: Skin is warm and dry.      Capillary Refill: Capillary refill takes less than 2 seconds.      Coloration: Skin is not jaundiced.      Findings: No bruising or lesion.   Neurological:      General: No focal deficit present.      Mental Status: He is alert and oriented to person, place, and time. Mental status is at baseline.   Psychiatric:         Mood and Affect: Mood normal.         Behavior: Behavior normal.         Thought Content: Thought content normal.         Judgment: Judgment normal.         Results Reviewed       Procedure Component Value Units Date/Time    CBC and differential [[id number]]     Lab Status: No result Specimen: Blood     Comprehensive metabolic panel [[id number]]     Lab Status: No result Specimen: Blood     Protime-INR [[id number]]     Lab Status: No result Specimen: Blood     APTT [[id number]]     Lab Status: No result Specimen: Blood             VAS VENOUS DUPLEX -LOWER LIMB UNILATERAL    (Results Pending)       Procedures    ED Medication and Procedure Management   Prior to Admission Medications   Prescriptions Last Dose Informant Patient Reported? Taking?   Choline Fenofibrate (Fenofibric Acid) 135 MG CPDR  Self No No   Sig: TAKE ONE CAPSULE BY MOUTH EVERY DAY   Empagliflozin 10 MG TABS  Self Yes No   Sig: Take 10 mg by mouth every morning   Insulin Pen Needle (Pen Needles) 32G X 5 MM MISC  Self Yes No   Sig: use once daily as directed   Multiple Vitamins-Minerals (MULTIVITAMIN ADULTS PO)  Self Yes No   Sig: Take by mouth daily after breakfast   NON FORMULARY  Self Yes No   Sig: Pantessin 1 capsule daily   Pancrelipase, Lip-Prot-Amyl, (Creon) 72935-449983 units CPEP  Self No No   Sig: TAKE 1 CAPSULE (36,000 UNITS TOTAL) BY MOUTH 3 (THREE) TIMES A DAY BEFORE MEALS   TURMERIC PO  Self Yes No   Sig: Take by mouth in the morning   albuterol (PROVENTIL HFA,VENTOLIN HFA) 90 mcg/act inhaler   Self No No   Sig: INHALE TWO PUFFS BY MOUTH EVERY 6 HOURS AS NEEDED FOR WHEEZING   amLODIPine (NORVASC) 5 mg tablet  Self No No   Sig: Take 1 tablet (5 mg total) by mouth daily   busPIRone (BUSPAR) 5 mg tablet  Self No No   Sig: Take 1 tablet (5 mg total) by mouth 2 (two) times a day   esomeprazole (NexIUM) 40 MG capsule  Self No No   Sig: Take 1 capsule (40 mg total) by mouth 2 (two) times a day before meals   famotidine (PEPCID) 40 MG tablet  Self Yes No   Sig: Take 40 mg by mouth daily at bedtime as needed    fluticasone (FLONASE) 50 mcg/act nasal spray  Self No No   Si spray into each nostril daily   insulin degludec (Tresiba FlexTouch) 100 units/mL injection pen  Self Yes No   Si units subcut in hs   lisinopril (ZESTRIL) 20 mg tablet  Self No No   Sig: TAKE ONE TABLET BY MOUTH EVERY DAY   mirtazapine (REMERON) 7.5 MG tablet  Self No No   Sig: Take 1 tablet (7.5 mg total) by mouth daily at bedtime   omega-3-acid ethyl esters (LOVAZA) 1 g capsule  Self No No   Sig: TAKE TWO CAPSULES BY MOUTH TWICE DAILY   Patient not taking: Reported on 2024   ondansetron (ZOFRAN-ODT) 4 mg disintegrating tablet  Self No No   Sig: Take 1 tablet (4 mg total) by mouth every 8 (eight) hours as needed for nausea or vomiting   pancrelipase, Lip-Prot-Amyl, (Creon) 12,000 units capsule  Self No No   Sig: Take 12,000 units of lipase by mouth 3 (three) times a day as needed for snacks   propranolol (INDERAL) 20 mg tablet  Self No No   Sig: TAKE 1 TABLET BY MOUTH TWICE DAILY   rosuvastatin (CRESTOR) 40 MG tablet  Self No No   Sig: TAKE ONE TABLET BY MOUTH EVERY DAY      Facility-Administered Medications: None     Patient's Medications   Discharge Prescriptions    No medications on file     No discharge procedures on file.  ED SEPSIS DOCUMENTATION          01/01/25 0919    Lab Status: Final result Specimen: Blood from Arm, Right Updated: 01/01/25 0949     PTT 33 seconds     CBC and differential [[id number]]  (Abnormal) Collected: 01/01/25 0919    Lab Status: Final result Specimen: Blood from Arm, Right Updated: 01/01/25 0949     WBC 3.91 Thousand/uL      RBC 3.49 Million/uL      Hemoglobin 11.6 g/dL      Hematocrit 35.3 %       fL      MCH 33.2 pg      MCHC 32.9 g/dL      RDW 14.4 %      MPV 11.0 fL      Platelets 50 Thousands/uL      nRBC 0 /100 WBCs      Segmented % 42 %      Immature Grans % 0 %      Lymphocytes % 36 %      Monocytes % 17 %      Eosinophils Relative 3 %      Basophils Relative 2 %      Absolute Neutrophils 1.69 Thousands/µL      Absolute Immature Grans 0.01 Thousand/uL      Absolute Lymphocytes 1.39 Thousands/µL      Absolute Monocytes 0.65 Thousand/µL      Eosinophils Absolute 0.11 Thousand/µL      Basophils Absolute 0.06 Thousands/µL             CTA lower extremity left w wo contrast   Final Interpretation by Erick Sanchez MD (01/02 4281)      1.  Normal arterial evaluation of the left lower extremity.   2.  Mild asymmetric subcutaneous infiltration of the lower left calf and foot. No defined fluid collection.   3.  Incompletely visualized cirrhotic liver morphology with sequela of portal hypertension, evidenced by a paraumbilical varix and small volume dependent pelvic ascites.      Preliminary report provided by Dr. Howell in [facility]. Final report is congruent.            Workstation performed: [id number]         VAS VENOUS DUPLEX -LOWER LIMB UNILATERAL   Final Interpretation by Patricia Agrawal MD (01/01 3289)          Procedures    ED Medication and Procedure Management   Prior to Admission Medications   Prescriptions Last Dose Informant Patient Reported? Taking?   Choline Fenofibrate (Fenofibric Acid) 135 MG CPDR  Self No No   Sig: TAKE ONE CAPSULE BY MOUTH EVERY DAY   Empagliflozin 10 MG TABS  Self Yes No   Sig: Take 10 mg by mouth  every morning   Insulin Pen Needle (Pen Needles) 32G X 5 MM MISC  Self Yes No   Sig: use once daily as directed   Multiple Vitamins-Minerals (MULTIVITAMIN ADULTS PO)  Self Yes No   Sig: Take by mouth daily after breakfast   NON FORMULARY  Self Yes No   Sig: Pantessin 1 capsule daily   Pancrelipase, Lip-Prot-Amyl, (Creon) 76532-168792 units CPEP  Self No No   Sig: TAKE 1 CAPSULE (36,000 UNITS TOTAL) BY MOUTH 3 (THREE) TIMES A DAY BEFORE MEALS   TURMERIC PO  Self Yes No   Sig: Take by mouth in the morning   albuterol (PROVENTIL HFA,VENTOLIN HFA) 90 mcg/act inhaler  Self No No   Sig: INHALE TWO PUFFS BY MOUTH EVERY 6 HOURS AS NEEDED FOR WHEEZING   amLODIPine (NORVASC) 5 mg tablet  Self No No   Sig: Take 1 tablet (5 mg total) by mouth daily   busPIRone (BUSPAR) 5 mg tablet  Self No No   Sig: Take 1 tablet (5 mg total) by mouth 2 (two) times a day   esomeprazole (NexIUM) 40 MG capsule  Self No No   Sig: Take 1 capsule (40 mg total) by mouth 2 (two) times a day before meals   famotidine (PEPCID) 40 MG tablet  Self Yes No   Sig: Take 40 mg by mouth daily at bedtime as needed    fluticasone (FLONASE) 50 mcg/act nasal spray  Self No No   Si spray into each nostril daily   insulin degludec (Tresiba FlexTouch) 100 units/mL injection pen  Self Yes No   Si units subcut in hs   lisinopril (ZESTRIL) 20 mg tablet  Self No No   Sig: TAKE ONE TABLET BY MOUTH EVERY DAY   mirtazapine (REMERON) 7.5 MG tablet  Self No No   Sig: Take 1 tablet (7.5 mg total) by mouth daily at bedtime   omega-3-acid ethyl esters (LOVAZA) 1 g capsule  Self No No   Sig: TAKE TWO CAPSULES BY MOUTH TWICE DAILY   Patient not taking: Reported on 2024   ondansetron (ZOFRAN-ODT) 4 mg disintegrating tablet  Self No No   Sig: Take 1 tablet (4 mg total) by mouth every 8 (eight) hours as needed for nausea or vomiting   pancrelipase, Lip-Prot-Amyl, (Creon) 12,000 units capsule  Self No No   Sig: Take 12,000 units of lipase by mouth 3 (three) times a day as  needed for snacks   propranolol (INDERAL) 20 mg tablet  Self No No   Sig: TAKE 1 TABLET BY MOUTH TWICE DAILY   rosuvastatin (CRESTOR) 40 MG tablet  Self No No   Sig: TAKE ONE TABLET BY MOUTH EVERY DAY      Facility-Administered Medications: None     Discharge Medication List as of 1/1/2025  8:29 PM        CONTINUE these medications which have CHANGED    Details   predniSONE 10 mg tablet Multiple Dosages:Starting Wed 1/1/2025, Until Thu 1/2/2025 at 2359, THEN Starting Fri 1/3/2025, Until Sat 1/4/2025 at 2359, THEN Starting Sun 1/5/2025, Until Mon 1/6/2025 at 2359, THEN Starting Tue 1/7/2025, Until Wed 1/8/2025 at 2359, THEN Starting  Thu 1/9/2025, Until Fri 1/10/2025 at 2359Take 5 tablets (50 mg total) by mouth daily for 2 days, THEN 4 tablets (40 mg total) daily for 2 days, THEN 3 tablets (30 mg total) daily for 2 days, THEN 2 tablets (20 mg total) daily for 2 days, THEN 1 tablet  (10 mg total) daily for 2 days., Normal           CONTINUE these medications which have NOT CHANGED    Details   albuterol (PROVENTIL HFA,VENTOLIN HFA) 90 mcg/act inhaler INHALE TWO PUFFS BY MOUTH EVERY 6 HOURS AS NEEDED FOR WHEEZING, Normal      amLODIPine (NORVASC) 5 mg tablet Take 1 tablet (5 mg total) by mouth daily, Starting Wed 6/26/2024, Normal      busPIRone (BUSPAR) 5 mg tablet Take 1 tablet (5 mg total) by mouth 2 (two) times a day, Starting Wed 12/4/2024, Normal      Choline Fenofibrate (Fenofibric Acid) 135 MG CPDR TAKE ONE CAPSULE BY MOUTH EVERY DAY, Starting Sat 6/22/2024, Normal      Empagliflozin 10 MG TABS Take 10 mg by mouth every morning, Historical Med      esomeprazole (NexIUM) 40 MG capsule Take 1 capsule (40 mg total) by mouth 2 (two) times a day before meals, Starting Tue 11/19/2024, Normal      famotidine (PEPCID) 40 MG tablet Take 40 mg by mouth daily at bedtime as needed , Historical Med      fluticasone (FLONASE) 50 mcg/act nasal spray 1 spray into each nostril daily, Starting Mon 2/5/2024, Normal      insulin  degludec (Tresiba FlexTouch) 100 units/mL injection pen 20 units subcut in hs, Historical Med      Insulin Pen Needle (Pen Needles) 32G X 5 MM MISC use once daily as directed, Historical Med      lisinopril (ZESTRIL) 20 mg tablet TAKE ONE TABLET BY MOUTH EVERY DAY, Starting Sun 8/11/2024, Normal      mirtazapine (REMERON) 7.5 MG tablet Take 1 tablet (7.5 mg total) by mouth daily at bedtime, Starting Wed 12/4/2024, Normal      Multiple Vitamins-Minerals (MULTIVITAMIN ADULTS PO) Take by mouth daily after breakfast, Historical Med      NON FORMULARY Pantessin 1 capsule daily, Historical Med      omega-3-acid ethyl esters (LOVAZA) 1 g capsule TAKE TWO CAPSULES BY MOUTH TWICE DAILY, Normal      ondansetron (ZOFRAN-ODT) 4 mg disintegrating tablet Take 1 tablet (4 mg total) by mouth every 8 (eight) hours as needed for nausea or vomiting, Starting Wed 10/30/2024, Normal      pancrelipase, Lip-Prot-Amyl, (Creon) 12,000 units capsule Take 12,000 units of lipase by mouth 3 (three) times a day as needed for snacks, Starting Tue 6/25/2024, Until Mon 9/30/2024 at 2359, Normal      Pancrelipase, Lip-Prot-Amyl, (Creon) 33122-780468 units CPEP TAKE 1 CAPSULE (36,000 UNITS TOTAL) BY MOUTH 3 (THREE) TIMES A DAY BEFORE MEALS, Starting Tue 9/10/2024, Normal      propranolol (INDERAL) 20 mg tablet TAKE 1 TABLET BY MOUTH TWICE DAILY, Normal      rosuvastatin (CRESTOR) 40 MG tablet TAKE ONE TABLET BY MOUTH EVERY DAY, Starting Fri 7/5/2024, Normal      TURMERIC PO Take by mouth in the morning, Historical Med           No discharge procedures on file.  ED SEPSIS DOCUMENTATION   Time reflects when diagnosis was documented in both MDM as applicable and the Disposition within this note       Time User Action Codes Description Comment    1/1/2025  8:27 PM Gilles Miranda Add [D69.6] Thrombocytopenia (HCC)                  Bretifrah Ehsan López DO  01/08/25 1252

## 2025-01-02 NOTE — QUICK NOTE
Concern for left LE bruising.    Prelim CTA:    3 vessel runoff on the left. No active extravasation. Final report to be dictated tomorrow.

## 2025-01-09 ENCOUNTER — OFFICE VISIT (OUTPATIENT)
Dept: FAMILY MEDICINE CLINIC | Facility: CLINIC | Age: 50
End: 2025-01-09

## 2025-01-09 ENCOUNTER — TELEPHONE (OUTPATIENT)
Dept: FAMILY MEDICINE CLINIC | Facility: CLINIC | Age: 50
End: 2025-01-09

## 2025-01-09 VITALS
DIASTOLIC BLOOD PRESSURE: 72 MMHG | HEART RATE: 88 BPM | WEIGHT: 184 LBS | HEIGHT: 67 IN | RESPIRATION RATE: 18 BRPM | OXYGEN SATURATION: 98 % | BODY MASS INDEX: 28.88 KG/M2 | SYSTOLIC BLOOD PRESSURE: 138 MMHG

## 2025-01-09 DIAGNOSIS — I10 PRIMARY HYPERTENSION: ICD-10-CM

## 2025-01-09 DIAGNOSIS — E11.65 CONTROLLED TYPE 2 DIABETES MELLITUS WITH HYPERGLYCEMIA, WITH LONG-TERM CURRENT USE OF INSULIN (HCC): Primary | ICD-10-CM

## 2025-01-09 DIAGNOSIS — K86.1 CHRONIC PANCREATITIS, UNSPECIFIED PANCREATITIS TYPE (HCC): ICD-10-CM

## 2025-01-09 DIAGNOSIS — K74.60 CIRRHOSIS OF LIVER WITHOUT ASCITES, UNSPECIFIED HEPATIC CIRRHOSIS TYPE (HCC): ICD-10-CM

## 2025-01-09 DIAGNOSIS — Z79.4 CONTROLLED TYPE 2 DIABETES MELLITUS WITH HYPERGLYCEMIA, WITH LONG-TERM CURRENT USE OF INSULIN (HCC): Primary | ICD-10-CM

## 2025-01-09 DIAGNOSIS — E87.6 HYPOKALEMIA: ICD-10-CM

## 2025-01-09 LAB
LEFT EYE DIABETIC RETINOPATHY: NORMAL
LEFT EYE IMAGE QUALITY: NORMAL
LEFT EYE MACULAR EDEMA: NORMAL
LEFT EYE OTHER RETINOPATHY: NORMAL
RIGHT EYE DIABETIC RETINOPATHY: NORMAL
RIGHT EYE IMAGE QUALITY: NORMAL
RIGHT EYE MACULAR EDEMA: NORMAL
RIGHT EYE OTHER RETINOPATHY: NORMAL
SEVERITY (EYE EXAM): NORMAL

## 2025-01-09 PROCEDURE — 92250 FUNDUS PHOTOGRAPHY W/I&R: CPT | Performed by: FAMILY MEDICINE

## 2025-01-09 PROCEDURE — 99214 OFFICE O/P EST MOD 30 MIN: CPT | Performed by: FAMILY MEDICINE

## 2025-01-09 NOTE — ASSESSMENT & PLAN NOTE
Stable continue current care negative reported episodes of hypoglycemia  Lab Results   Component Value Date    HGBA1C 4.8 12/04/2024       Orders:    Comprehensive metabolic panel; Future    CBC and differential; Future    Hemoglobin A1C; Future    Lipid Panel with Direct LDL reflex; Future    TSH, 3rd generation; Future    Albumin / creatinine urine ratio; Future    IRIS Diabetic eye exam

## 2025-01-09 NOTE — TELEPHONE ENCOUNTER
Patient called into the Pod and was wondering of the medication Bill was going to be sending to the Pharmacy for the patient liver.  Please advise.  Thanks

## 2025-01-09 NOTE — PROGRESS NOTES
Name: Ashwin Wright      : 1975      MRN: [id number]  Encounter Provider: LUANA Mejía  Encounter Date: 2025   Encounter department: [facility] [address]  [facility]  :  Assessment & Plan  Primary hypertension  Stable within parameters continue current medications encouraged home monitoring  Orders:    Comprehensive metabolic panel; Future    CBC and differential; Future    Hemoglobin A1C; Future    Lipid Panel with Direct LDL reflex; Future    TSH, 3rd generation; Future    Controlled type 2 diabetes mellitus with hyperglycemia, with long-term current use of insulin (HCC)  Stable continue current care negative reported episodes of hypoglycemia  Lab Results   Component Value Date    HGBA1C 4.8 2024       Orders:    Comprehensive metabolic panel; Future    CBC and differential; Future    Hemoglobin A1C; Future    Lipid Panel with Direct LDL reflex; Future    TSH, 3rd generation; Future    Albumin / creatinine urine ratio; Future    IRIS Diabetic eye exam    Hypokalemia  Denies any complaints of leg cramps, replenished ER, recommend recheck values  Orders:    Comprehensive metabolic panel; Future    CBC and differential; Future    Hemoglobin A1C; Future    Lipid Panel with Direct LDL reflex; Future    TSH, 3rd generation; Future    Chronic pancreatitis, unspecified pancreatitis type (HCC)  Continue current care GI  Orders:    PHOSPHATIDYLETHANOL; Future    Cirrhosis of liver without ascites, unspecified hepatic cirrhosis type (HCC)  Continue current care with GI              History of Present Illness     F/u er   Will need return to work note  Noted to have swelling in the left ankle, muscle cramps  , eval er ,   Presently improved, denies any complaints pain cramping, swelling trace sock line  Bp Continues with Inderal lisinopril, amlodipine  Negative missed dosing tolerating  Negative chest pain palpitation shortness of breath  Chronic pancreatitis, cirrhosis  "admits to No , etoh intake for a minimum of 1 months   Denies any abdominal pain pressure negative change in bowel or bladder habits negative skin changes  Scheduled follow-up GI        Review of Systems   Constitutional:  Negative for appetite change, chills, fever and unexpected weight change.   HENT:  Negative for congestion, dental problem, ear pain, hearing loss, postnasal drip, rhinorrhea, sinus pressure, sinus pain, sneezing, sore throat, tinnitus and voice change.    Eyes:  Negative for visual disturbance.   Respiratory:  Negative for apnea, cough, chest tightness and shortness of breath.    Cardiovascular:  Negative for chest pain, palpitations and leg swelling.   Gastrointestinal:  Negative for abdominal pain, blood in stool, constipation, diarrhea, nausea and vomiting.   Endocrine: Negative for cold intolerance, heat intolerance, polydipsia, polyphagia and polyuria.   Genitourinary:  Negative for decreased urine volume, difficulty urinating, dysuria, frequency and hematuria.   Musculoskeletal:  Negative for arthralgias, back pain, gait problem, joint swelling and myalgias.   Skin:  Negative for color change, rash and wound.   Allergic/Immunologic: Negative for environmental allergies and food allergies.   Neurological:  Negative for dizziness, syncope, weakness, light-headedness, numbness and headaches.   Hematological:  Negative for adenopathy. Does not bruise/bleed easily.   Psychiatric/Behavioral:  Negative for sleep disturbance and suicidal ideas. The patient is not nervous/anxious.        Objective   /72 (BP Location: Left arm, Patient Position: Sitting)   Pulse 88   Resp 18   Ht 5' 7\" (1.702 m)   Wt 83.5 kg (184 lb)   SpO2 98%   BMI 28.82 kg/m²      Physical Exam  Constitutional:       General: He is not in acute distress.     Appearance: He is well-developed. He is not ill-appearing or toxic-appearing.   HENT:      Head: Normocephalic and atraumatic.   Cardiovascular:      Rate and " Rhythm: Normal rate and regular rhythm.      Heart sounds: Normal heart sounds.   Pulmonary:      Effort: Pulmonary effort is normal.      Breath sounds: Normal breath sounds.   Abdominal:      General: Bowel sounds are normal.      Palpations: Abdomen is soft.   Musculoskeletal:         General: Normal range of motion.      Cervical back: Normal range of motion and neck supple.      Right lower leg: No edema.      Left lower leg: No edema.   Skin:     General: Skin is warm and dry.      Findings: No lesion or rash.   Neurological:      Mental Status: He is alert and oriented to person, place, and time.      Deep Tendon Reflexes: Reflexes are normal and symmetric.   Psychiatric:         Behavior: Behavior normal.         Thought Content: Thought content normal.         Judgment: Judgment normal.

## 2025-01-09 NOTE — ASSESSMENT & PLAN NOTE
Stable within parameters continue current medications encouraged home monitoring  Orders:    Comprehensive metabolic panel; Future    CBC and differential; Future    Hemoglobin A1C; Future    Lipid Panel with Direct LDL reflex; Future    TSH, 3rd generation; Future

## 2025-01-09 NOTE — TELEPHONE ENCOUNTER
Patient's wife called about the patients script. She stated the pharmacy doesn't have anything and she is asking if it could be sent.     Please advise, thank you.

## 2025-01-10 ENCOUNTER — TELEPHONE (OUTPATIENT)
Age: 50
End: 2025-01-10

## 2025-01-10 DIAGNOSIS — R79.89 ELEVATED LFTS: ICD-10-CM

## 2025-01-10 DIAGNOSIS — M79.89 LEG SWELLING: Primary | ICD-10-CM

## 2025-01-10 NOTE — TELEPHONE ENCOUNTER
I reviewed labs/imaging done. No other labs needs to be done other than what PCP ordered but I would like him to get a special ultrasound done of his liver to make sure there is no blood clot near his liver that would cause swelling. I will order this and he would need to call central scheduling to get this done. Let's make him a follow up with me in then next 4 weeks if possible as well.

## 2025-01-10 NOTE — TELEPHONE ENCOUNTER
Pt. C/o LLE edema , pitting, was seen in ER over [place] years [place] and had blood work done, CT scan and US and no blood clot was found, pt. Saw pcp yesterday as a follow up and wanted pt. To reach out to Dinora Mensah due to elevated liver levels in blood work, pt. Still having tingling, swelling and discoloration to LLE from knee  down, denies SOB or abdominal swelling, last OV on 12/19/24 pt. had no LLE edema or ascites,  pcp ordered blood work for pt. To have done and pt. Will get lab work done tomorrow at [facility] , please advise if he needs any additional testing or office visit

## 2025-01-13 ENCOUNTER — APPOINTMENT (EMERGENCY)
Dept: RADIOLOGY | Facility: HOSPITAL | Age: 50
End: 2025-01-13

## 2025-01-13 ENCOUNTER — APPOINTMENT (EMERGENCY)
Dept: VASCULAR ULTRASOUND | Facility: HOSPITAL | Age: 50
End: 2025-01-13

## 2025-01-13 ENCOUNTER — HOSPITAL ENCOUNTER (EMERGENCY)
Facility: HOSPITAL | Age: 50
Discharge: HOME | End: 2025-01-13
Attending: EMERGENCY MEDICINE | Admitting: EMERGENCY MEDICINE

## 2025-01-13 VITALS
SYSTOLIC BLOOD PRESSURE: 125 MMHG | HEART RATE: 80 BPM | RESPIRATION RATE: 20 BRPM | DIASTOLIC BLOOD PRESSURE: 88 MMHG | OXYGEN SATURATION: 98 % | TEMPERATURE: 97.6 F

## 2025-01-13 DIAGNOSIS — M79.606 LEG PAIN: Primary | ICD-10-CM

## 2025-01-13 PROCEDURE — 93971 EXTREMITY STUDY: CPT

## 2025-01-13 PROCEDURE — 93971 EXTREMITY STUDY: CPT | Performed by: INTERNAL MEDICINE

## 2025-01-13 PROCEDURE — 99284 EMERGENCY DEPT VISIT MOD MDM: CPT | Performed by: EMERGENCY MEDICINE

## 2025-01-13 PROCEDURE — 73552 X-RAY EXAM OF FEMUR 2/>: CPT

## 2025-01-13 PROCEDURE — 99284 EMERGENCY DEPT VISIT MOD MDM: CPT

## 2025-01-13 RX ADMIN — DICLOFENAC SODIUM 2 G: 10 GEL TOPICAL at 11:02

## 2025-01-13 NOTE — ED PROVIDER NOTES
Time reflects when diagnosis was documented in both MDM as applicable and the Disposition within this note       Time User Action Codes Description Comment    1/13/2025 10:38 AM Nicki Mario Add [M79.606] Leg pain           ED Disposition       ED Disposition   Discharge    Condition   Stable    Date/Time   Mon Jan 13, 2025 10:38 AM    Comment   Ashwin Adriana discharge to home/self care.                   Assessment & Plan       Medical Decision Making  This patient presents with joint pain.  I have low suspicion for fracture, dislocation, significant ligamentous injury, septic arthritis, gout or new autoimmune arthropathy.  Patient is negative for DVT.  Patient had a x-ray of his right femur and has potential femoral acetabular impingement.  Patient given crutches.  Patient advised to follow-up with orthopedics.  Multi modal regimen given for pain relief.  Return to the ER symptoms worsens or questions or concerns arise at home.      Amount and/or Complexity of Data Reviewed  Radiology: ordered. Decision-making details documented in ED Course.        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     There is asphericity at the superior femoral head/neck junction may present clinically as cam-type MARIAN (femoral acetabular impingement)         Medications - No data to display    ED Risk Strat Scores                                              History of Present Illness       Chief Complaint   Patient presents with    Leg Pain     Right thigh pain since 0430 this am.        Past Medical History:   Diagnosis Date    Arthritis     Asthma     Chronic pain disorder     Chronic pancreatitis (HCC)     Cirrhosis (HCC)     early cirrhosis    GERD (gastroesophageal reflux disease)     History of COVID-19 01/2022    mild s/s    Hyperlipidemia     Hypertension     Liver abscess     Liver disease     Meniscus tear     left  knee work injury last assessed 08/24/2016    Pancreatitis     Pneumonia     Rotator cuff tear     Stomach disorder     Chronic gastritus      Past Surgical History:   Procedure Laterality Date    CELIAC PLEXUS BLOCK Bilateral 10/29/2018    Procedure: SPLANCHNIC NERVE BLOCK;  Surgeon: Ramiro Chinchilla MD;  [facility];  Service: Pain Management     CELIAC PLEXUS BLOCK Bilateral 01/10/2019    Procedure: SPLANCHNIC NERVE BLOCK;  Surgeon: Ramiro Chinchilla MD;  [facility];  Service: Pain Management     CHOLECYSTECTOMY      COLONOSCOPY      ESOPHAGOGASTRODUODENOSCOPY N/A 11/16/2017    Procedure: ESOPHAGOGASTRODUODENOSCOPY (EGD);  Surgeon: Ever Bonner MD;  [facility]: [facility];  Service: Gastroenterology    ESOPHAGOGASTRODUODENOSCOPY N/A 04/19/2018    Procedure: ESOPHAGOGASTRODUODENOSCOPY (EGD);  Surgeon: Orestes Forrest MD;  [facility];  Service: Gastroenterology    ESOPHAGOGASTRODUODENOSCOPY N/A 05/10/2018    Procedure: ESOPHAGOGASTRODUODENOSCOPY (EGD);  Surgeon: Vaibhav Matthew MD;  [facility];  Service: Gastroenterology    HERNIA REPAIR Bilateral 02/03/2023    Procedure: REPAIR HERNIA INGUINAL, LAPAROSCOPIC,;  Surgeon: Juanjo Travis DO;  [facility];  Service: General    IR TEMPORARY DIALYSIS CATHETER PLACEMENT  02/28/2019    KNEE ARTHROSCOPY Bilateral     KNEE ARTHROSCOPY Right 2009    cibishino last assessed 08/24/2016    KNEE ARTHROSCOPY Right 07/01/2019    LIVER SURGERY      NERVE BLOCK Bilateral 05/29/2018    Procedure: SPLANCHNIC NERVE BLOCK;  Surgeon: Ramiro Chinchilla MD;  [facility];  Service: Pain Management     PANCREAS SURGERY      stents    PANCREATIC CYST EXCISION      WI INJECTION ANES LMBR/THRC PARAVERTBRL SYMPATHETIC Bilateral 12/28/2017    Procedure: SPLANCHNIC NERVE BLOCK;  Surgeon: Ramiro Chinchilla MD;  [facility];  Service: Pain Management     WI INJX ANES CELIAC PLEXUS W/WO RADIOLOGIC MONITRNG Bilateral 05/09/2017    Procedure:  CELIAC PLEXUS BLOCK ;  Surgeon: Ramiro Chinchilla MD;  [facility];  Service: Pain Management     MT INJX ANES CELIAC PLEXUS W/WO RADIOLOGIC MONITRNG Bilateral 06/01/2017    Procedure: SPLANCHNIC NERVE BLOCK at T12;  Surgeon: Ramiro Chinchilla MD;  [facility];  Service: Pain Management     MT INJX ANES CELIAC PLEXUS W/WO RADIOLOGIC MONITRNG Bilateral 08/08/2017    Procedure: BILATERAL SPLANCHNIC NERVE BLOCK T12;  Surgeon: Ramiro Chinchilla MD;  [facility];  Service: Pain Management     MT INJX ANES CELIAC PLEXUS W/WO RADIOLOGIC MONITRNG Bilateral 04/18/2019    Procedure: BLOCK / INJECTION CELIAC PLEXUS;  Surgeon: Ramiro Chinchilla MD;  [facility];  Service: Pain Management     MT INJX ANES CELIAC PLEXUS W/WO RADIOLOGIC MONITRNG Bilateral 08/20/2019    Procedure: SPLANCHNIC NERVE BLOCK;  Surgeon: Ramiro Chinchilla MD;  [facility];  Service: Pain Management     MT INJX ANES CELIAC PLEXUS W/WO RADIOLOGIC MONITRNG Bilateral 10/17/2019    Procedure: SPLANCHNIC NERVE BLOCK;  Surgeon: Ramiro Chinchilla MD;  [facility];  Service: Pain Management     MT LAPAROSCOPY SURG CHOLECYSTECTOMY N/A 02/14/2017    Procedure: LAPAROSCOPIC CHOLECYSTECTOMY, IOC, POSSIBLE OPEN.;  Surgeon: Suresh Lang MD;  [facility];  Service: General    MT SURGICAL ARTHROSCOPY SHOULDER W/ROTATOR CUFF RPR Left 06/09/2023    Procedure: Left Shoulder Arthroscopic Rotator Cuff Repair, Open Subpectoral Biceps Tenodesis, Acromioplasty, Extensive Debridement;  Surgeon: Toro Healy DO;  [facility];  Service: Orthopedics    ROTATOR CUFF REPAIR Right     SHOULDER ARTHROSCOPY      SHOULDER ARTHROSCOPY Right     SHOULDER SURGERY      TENDON REPAIR  2015    Right shoulder      Family History   Problem Relation Age of Onset    Cirrhosis Mother     Heart disease Other         cardiac disorder    Cancer Other       Social History     Tobacco Use    Smoking status: Former     Current packs/day: 0.00      Average packs/day: 1 pack/day for 20.0 years (20.0 ttl pk-yrs)     Types: Cigarettes     Start date: 3/15/2001     Quit date: 3/15/2021     Years since quitting: 3.8    Smokeless tobacco: Never   Vaping Use    Vaping status: Some Days    Substances: Nicotine   Substance Use Topics    Alcohol use: Yes     Alcohol/week: 1.0 standard drink of alcohol     Types: 1 Cans of beer per week     Comment: on weekends    Drug use: No      E-Cigarette/Vaping    E-Cigarette Use Current Some Day User       E-Cigarette/Vaping Substances    Nicotine Yes     THC No     CBD No     Flavoring No     Other No     Unknown No       I have reviewed and agree with the history as documented.     48 y/o male patient presents to the ER for evaluation of leg pain. Patient stated that he was awoken out of his sleep this morning with right thigh pain.     No noted trauma/injury  No noted ecchymosis, rash, erythema or open wounds  Patient ambulatory on arrival  Neurovascularly intact, sensation equal bilaterally      Leg Pain  Associated symptoms: no back pain and no fever        Review of Systems   Constitutional:  Negative for chills and fever.   HENT:  Negative for ear pain and sore throat.    Eyes:  Negative for pain and visual disturbance.   Respiratory:  Negative for cough and shortness of breath.    Cardiovascular:  Negative for chest pain and palpitations.   Gastrointestinal:  Negative for abdominal pain and vomiting.   Genitourinary:  Negative for dysuria and hematuria.   Musculoskeletal:  Positive for arthralgias. Negative for back pain.   Skin:  Negative for color change and rash.   Neurological:  Negative for seizures and syncope.   All other systems reviewed and are negative.          Objective       ED Triage Vitals [01/13/25 0913]   Temperature Pulse Blood Pressure Respirations SpO2 Patient Position - Orthostatic VS   97.6 °F (36.4 °C) 80 125/88 20 98 % Sitting      Temp Source Heart Rate Source BP Location FiO2 (%) Pain Score     Oral Monitor Left arm -- --      Vitals      Date and Time Temp Pulse SpO2 Resp BP Pain Score FACES Pain Rating User   01/13/25 0913 97.6 °F (36.4 °C) 80 98 % 20 125/88 -- -- RG            Physical Exam  Vitals and nursing note reviewed.   Constitutional:       General: He is not in acute distress.     Appearance: He is well-developed.   HENT:      Head: Normocephalic and atraumatic.      Right Ear: External ear normal.      Left Ear: External ear normal.   Eyes:      Conjunctiva/sclera: Conjunctivae normal.   Cardiovascular:      Rate and Rhythm: Normal rate and regular rhythm.      Pulses: Normal pulses.      Heart sounds: Normal heart sounds.   Pulmonary:      Effort: Pulmonary effort is normal. No respiratory distress.      Breath sounds: Normal breath sounds.   Abdominal:      Palpations: Abdomen is soft.   Musculoskeletal:         General: No swelling.      Cervical back: Neck supple.      Right upper leg: Tenderness present.   Skin:     General: Skin is warm and dry.      Capillary Refill: Capillary refill takes less than 2 seconds.   Neurological:      Mental Status: He is alert and oriented to person, place, and time. Mental status is at baseline.   Psychiatric:         Mood and Affect: Mood normal.         Behavior: Behavior normal.         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      There is asphericity at the superior femoral head/neck junction may present clinically as cam-type MARIAN (femoral acetabular impingement).         Computerized Assisted Algorithm (CAA) may have been used to analyze all applicable images.         Workstation performed: [id number]         VAS VENOUS DUPLEX -LOWER LIMB UNILATERAL   Final Interpretation by Bentley Wong MD (01/13 1110)          Procedures    ED Medication and Procedure Management   Prior to Admission Medications   Prescriptions  Last Dose Informant Patient Reported? Taking?   Choline Fenofibrate (Fenofibric Acid) 135 MG CPDR  Self No No   Sig: TAKE ONE CAPSULE BY MOUTH EVERY DAY   Empagliflozin 10 MG TABS  Self Yes No   Sig: Take 10 mg by mouth every morning   Insulin Pen Needle (Pen Needles) 32G X 5 MM MISC  Self Yes No   Sig: use once daily as directed   Multiple Vitamins-Minerals (MULTIVITAMIN ADULTS PO)  Self Yes No   Sig: Take by mouth daily after breakfast   NON FORMULARY  Self Yes No   Sig: Pantessin 1 capsule daily   Pancrelipase, Lip-Prot-Amyl, (Creon) 79145-112553 units CPEP  Self No No   Sig: TAKE 1 CAPSULE (36,000 UNITS TOTAL) BY MOUTH 3 (THREE) TIMES A DAY BEFORE MEALS   TURMERIC PO  Self Yes No   Sig: Take by mouth in the morning   albuterol (PROVENTIL HFA,VENTOLIN HFA) 90 mcg/act inhaler  Self No No   Sig: INHALE TWO PUFFS BY MOUTH EVERY 6 HOURS AS NEEDED FOR WHEEZING   amLODIPine (NORVASC) 5 mg tablet  Self No No   Sig: Take 1 tablet (5 mg total) by mouth daily   busPIRone (BUSPAR) 5 mg tablet  Self No No   Sig: Take 1 tablet (5 mg total) by mouth 2 (two) times a day   esomeprazole (NexIUM) 40 MG capsule  Self No No   Sig: Take 1 capsule (40 mg total) by mouth 2 (two) times a day before meals   famotidine (PEPCID) 40 MG tablet  Self Yes No   Sig: Take 40 mg by mouth daily at bedtime as needed    fluticasone (FLONASE) 50 mcg/act nasal spray  Self No No   Si spray into each nostril daily   insulin degludec (Tresiba FlexTouch) 100 units/mL injection pen  Self Yes No   Si units subcut in hs   lisinopril (ZESTRIL) 20 mg tablet  Self No No   Sig: TAKE ONE TABLET BY MOUTH EVERY DAY   mirtazapine (REMERON) 7.5 MG tablet  Self No No   Sig: Take 1 tablet (7.5 mg total) by mouth daily at bedtime   omega-3-acid ethyl esters (LOVAZA) 1 g capsule  Self No No   Sig: TAKE TWO CAPSULES BY MOUTH TWICE DAILY   Patient not taking: Reported on 2024   ondansetron (ZOFRAN-ODT) 4 mg disintegrating tablet  Self No No   Sig: Take 1  tablet (4 mg total) by mouth every 8 (eight) hours as needed for nausea or vomiting   pancrelipase, Lip-Prot-Amyl, (Creon) 12,000 units capsule  Self No No   Sig: Take 12,000 units of lipase by mouth 3 (three) times a day as needed for snacks   propranolol (INDERAL) 20 mg tablet  Self No No   Sig: TAKE 1 TABLET BY MOUTH TWICE DAILY   rosuvastatin (CRESTOR) 40 MG tablet  Self No No   Sig: TAKE ONE TABLET BY MOUTH EVERY DAY      Facility-Administered Medications: None     Discharge Medication List as of 1/13/2025 10:40 AM        CONTINUE these medications which have NOT CHANGED    Details   albuterol (PROVENTIL HFA,VENTOLIN HFA) 90 mcg/act inhaler INHALE TWO PUFFS BY MOUTH EVERY 6 HOURS AS NEEDED FOR WHEEZING, Normal      amLODIPine (NORVASC) 5 mg tablet Take 1 tablet (5 mg total) by mouth daily, Starting Wed 6/26/2024, Normal      busPIRone (BUSPAR) 5 mg tablet Take 1 tablet (5 mg total) by mouth 2 (two) times a day, Starting Wed 12/4/2024, Normal      Choline Fenofibrate (Fenofibric Acid) 135 MG CPDR TAKE ONE CAPSULE BY MOUTH EVERY DAY, Starting Sat 6/22/2024, Normal      Empagliflozin 10 MG TABS Take 10 mg by mouth every morning, Historical Med      esomeprazole (NexIUM) 40 MG capsule Take 1 capsule (40 mg total) by mouth 2 (two) times a day before meals, Starting Tue 11/19/2024, Normal      famotidine (PEPCID) 40 MG tablet Take 40 mg by mouth daily at bedtime as needed , Historical Med      fluticasone (FLONASE) 50 mcg/act nasal spray 1 spray into each nostril daily, Starting Mon 2/5/2024, Normal      insulin degludec (Tresiba FlexTouch) 100 units/mL injection pen 20 units subcut in hs, Historical Med      Insulin Pen Needle (Pen Needles) 32G X 5 MM MISC use once daily as directed, Historical Med      lisinopril (ZESTRIL) 20 mg tablet TAKE ONE TABLET BY MOUTH EVERY DAY, Starting Sun 8/11/2024, Normal      mirtazapine (REMERON) 7.5 MG tablet Take 1 tablet (7.5 mg total) by mouth daily at bedtime, Starting Wed  12/4/2024, Normal      Multiple Vitamins-Minerals (MULTIVITAMIN ADULTS PO) Take by mouth daily after breakfast, Historical Med      NON FORMULARY Pantessin 1 capsule daily, Historical Med      omega-3-acid ethyl esters (LOVAZA) 1 g capsule TAKE TWO CAPSULES BY MOUTH TWICE DAILY, Normal      ondansetron (ZOFRAN-ODT) 4 mg disintegrating tablet Take 1 tablet (4 mg total) by mouth every 8 (eight) hours as needed for nausea or vomiting, Starting Wed 10/30/2024, Normal      pancrelipase, Lip-Prot-Amyl, (Creon) 12,000 units capsule Take 12,000 units of lipase by mouth 3 (three) times a day as needed for snacks, Starting Tue 6/25/2024, Until Mon 9/30/2024 at 2359, Normal      Pancrelipase, Lip-Prot-Amyl, (Creon) 65784-087705 units CPEP TAKE 1 CAPSULE (36,000 UNITS TOTAL) BY MOUTH 3 (THREE) TIMES A DAY BEFORE MEALS, Starting Tue 9/10/2024, Normal      propranolol (INDERAL) 20 mg tablet TAKE 1 TABLET BY MOUTH TWICE DAILY, Normal      rosuvastatin (CRESTOR) 40 MG tablet TAKE ONE TABLET BY MOUTH EVERY DAY, Starting Fri 7/5/2024, Normal      TURMERIC PO Take by mouth in the morning, Historical Med           No discharge procedures on file.  ED SEPSIS DOCUMENTATION   Time reflects when diagnosis was documented in both MDM as applicable and the Disposition within this note       Time User Action Codes Description Comment    1/13/2025 10:38 AM Nicki Mario Add [M79.606] Leg pain                  LUANA Galvan  01/13/25 5086

## 2025-01-13 NOTE — DISCHARGE INSTRUCTIONS
Voltaren up to four times a day as needed for pain  Rest, Ice, Compression, Elevation  Follow up with orthopedics as needed

## 2025-01-13 NOTE — Clinical Note
Ashwin Wright was seen and treated in our emergency department on 1/13/2025.    No restrictions            Diagnosis:     Ashwin  may return to work on return date.    He may return on this date: 01/14/2025         If you have any questions or concerns, please don't hesitate to call.      Alex Flores RN    ______________________________           _______________          _______________  Hospital Representative                              Date                                Time

## 2025-01-14 ENCOUNTER — HOSPITAL ENCOUNTER (EMERGENCY)
Facility: HOSPITAL | Age: 50
Discharge: HOME | End: 2025-01-14
Attending: EMERGENCY MEDICINE
Payer: COMMERCIAL

## 2025-01-14 VITALS
SYSTOLIC BLOOD PRESSURE: 120 MMHG | HEART RATE: 77 BPM | DIASTOLIC BLOOD PRESSURE: 73 MMHG | TEMPERATURE: 97.8 F | RESPIRATION RATE: 18 BRPM | OXYGEN SATURATION: 96 %

## 2025-01-14 DIAGNOSIS — M87.051 AVASCULAR NECROSIS OF RIGHT FEMUR (HCC): Primary | ICD-10-CM

## 2025-01-14 LAB
ATRIAL RATE: 48 BPM
ATRIAL RATE: 73 BPM
QRS AXIS: 55 DEGREES
QRS AXIS: 67 DEGREES
QRSD INTERVAL: 120 MS
QRSD INTERVAL: 122 MS
QT INTERVAL: 430 MS
QT INTERVAL: 446 MS
QTC INTERVAL: 490 MS
QTC INTERVAL: 495 MS
T WAVE AXIS: 14 DEGREES
T WAVE AXIS: 51 DEGREES
VENTRICULAR RATE: 74 BPM
VENTRICULAR RATE: 78 BPM

## 2025-01-14 PROCEDURE — 99284 EMERGENCY DEPT VISIT MOD MDM: CPT

## 2025-01-14 PROCEDURE — 93005 ELECTROCARDIOGRAM TRACING: CPT

## 2025-01-14 PROCEDURE — 93010 ELECTROCARDIOGRAM REPORT: CPT | Performed by: STUDENT IN AN ORGANIZED HEALTH CARE EDUCATION/TRAINING PROGRAM

## 2025-01-14 NOTE — ED PROVIDER NOTES
Time reflects when diagnosis was documented in both MDM as applicable and the Disposition within this note       Time User Action Codes Description Comment    1/14/2025  3:31 AM Mello Moore Add [M87.051] Avascular necrosis of right femur (HCC)           ED Disposition       ED Disposition   Discharge    Condition   Stable    Date/Time   Tue Jan 14, 2025  3:31 AM    Comment   Ashwinjose luis Wright discharge to home/self care.                   Assessment & Plan       Medical Decision Making  49-year-old male presents ED for evaluation of right thigh pain, tingling of lower extremities as seen in HPI.  On physical examination patient vital signs stable.  Patient in no apparent distress.  No murmur.  Normal breath sounds.  Tender to palpation of right groin region.  No overlying skin changes at site of pain.  No crepitus.  Able to flex right hip without difficulty.  Unremarkable motor assessment of bilateral lower extremities.  Hyperpigmentation of bilateral ankles.  No peripheral edema.  Neurovascularly intact of bilateral lower extremities.  No hair on bilateral feet.     Discussed results of workup from ED visit earlier in the day.  Explained that his right femur x-ray demonstrated avascular necrosis.  Spent time discussing the pathophysiology of venous necrosis with patient.  Explained that he will continue to have pain in the right hip.  Discussed that treatment for the condition involves using crutches with ambulation to take weight off of the hip.  Patient follow-up with orthopedics for long-term management of the condition.  Explained that he will continue to have pain and 1 option to reduce the amount of pain he experiences this is to take medication such as ibuprofen, Tylenol.  Patient firmly does not want to use any pain medications.  Explained to patient that this is entirely his decision however the structural injury will continue to cause pain.  Using the crutches will help reduce stress on the hip.  In  regards to his tingling of his lower extremities, observed the patient has hyperpigmentation of bilateral ankles.  As well as hand on his feet.  This could suggest PAD.  This would be best evaluated further with a follow-up at his PCP's office.  Do not suspect that patient experiencing emergent vascular crisis because the tingling of his feet, ankles.  His chart does say that he has history of well-controlled diabetes.  Recent A1c excellent.  Appears well-managed.  Further review of patient's chart demonstrates that he had a negative duplex ultrasound of his left leg on January 1.  This demonstrates that he has had one negative duplex studies in the past 2 weeks on each leg.  Discussed that duplex ultrasound is the gold standard test for DVT.  Discussed that this is reassuring.     At the end of conversation with patient he demonstrates understanding that he has avascular necrosis.  He understands that he recently had reassuring duplex ultrasound studies.  He understands that he needs to follow-up with orthopedics.  He understands that he will continue to have pain in his right hip, groin due to the avascular necrosis and that he can use over-the-counter anti-inflammatory medications for pain control if he chooses.  Patient understands that he needs to use crutches with ambulation.  Return precautions discussed.     Prior to discharge, discharge instructions were discussed with patient at bedside. Patient was provided both verbal and written instructions. Patient is understanding of the discharge instructions and is agreeable to plan of care. Return precautions were discussed with patient bedside, patient verbalized understanding of signs and symptoms that would necessitate return to the ED. All questions were answered. Patient was comfortable with the plan of care and discharged to home.    Portions of this chart may have been written with voice recognition software.  Occasional grammatical errors, wrong word or  "\"sound a like\" substitutions may have occurred due to software limitations.  Please read carefully and use context to recognize where substitutions have occurred.              Medications - No data to display    ED Risk Strat Scores                          SBIRT 22yo+      Flowsheet Row Most Recent Value   Initial Alcohol Screen: US AUDIT-C     1. How often do you have a drink containing alcohol? 0 Filed at: 01/14/2025 0223   2. How many drinks containing alcohol do you have on a typical day you are drinking?  0 Filed at: 01/14/2025 0223   3a. Male UNDER 65: How often do you have five or more drinks on one occasion? 0 Filed at: 01/14/2025 0223   Audit-C Score 0 Filed at: 01/14/2025 0223   EUSEBIO: How many times in the past year have you...    Used an illegal drug or used a prescription medication for non-medical reasons? Never Filed at: 01/14/2025 0223                            History of Present Illness       Chief Complaint   Patient presents with    Leg Pain     Pt BIBA c/o R sided Leg pain. Pt reports recent discharge for blood clot in same leg. Pt states new onset numbness bilateral feet.         Past Medical History:   Diagnosis Date    Arthritis     Asthma     Chronic pain disorder     Chronic pancreatitis (HCC)     Cirrhosis (HCC)     early cirrhosis    GERD (gastroesophageal reflux disease)     History of COVID-19 01/2022    mild s/s    Hyperlipidemia     Hypertension     Liver abscess     Liver disease     Meniscus tear     left knee work injury last assessed 08/24/2016    Pancreatitis     Pneumonia     Rotator cuff tear     Stomach disorder     Chronic gastritus      Past Surgical History:   Procedure Laterality Date    CELIAC PLEXUS BLOCK Bilateral 10/29/2018    Procedure: SPLANCHNIC NERVE BLOCK;  Surgeon: Ramiro Chinchilla MD;  [facility];  Service: Pain Management     CELIAC PLEXUS BLOCK Bilateral 01/10/2019    Procedure: SPLANCHNIC NERVE BLOCK;  Surgeon: Ramiro Chinchilla MD;  [facility];  Service: Pain Management     CHOLECYSTECTOMY      COLONOSCOPY      ESOPHAGOGASTRODUODENOSCOPY N/A 11/16/2017    Procedure: ESOPHAGOGASTRODUODENOSCOPY (EGD);  Surgeon: Ever Bonner MD;  [facility];  Service: Gastroenterology    ESOPHAGOGASTRODUODENOSCOPY N/A 04/19/2018    Procedure: ESOPHAGOGASTRODUODENOSCOPY (EGD);  Surgeon: Orestes Forrest MD;  [facility];  Service: Gastroenterology    ESOPHAGOGASTRODUODENOSCOPY N/A 05/10/2018    Procedure: ESOPHAGOGASTRODUODENOSCOPY (EGD);  Surgeon: Vaibhav Matthew MD;  [facility];  Service: Gastroenterology    HERNIA REPAIR Bilateral 02/03/2023    Procedure: REPAIR HERNIA INGUINAL, LAPAROSCOPIC,;  Surgeon: Juanjo Travis DO;  [facility];  Service: General    IR TEMPORARY DIALYSIS CATHETER PLACEMENT  02/28/2019    KNEE ARTHROSCOPY Bilateral     KNEE ARTHROSCOPY Right 2009    cibishino last assessed 08/24/2016    KNEE ARTHROSCOPY Right 07/01/2019    LIVER SURGERY      NERVE BLOCK Bilateral 05/29/2018    Procedure: SPLANCHNIC NERVE BLOCK;  Surgeon: Ramiro Chinchilla MD;  [facility];  Service: Pain Management     PANCREAS SURGERY      stents    PANCREATIC CYST EXCISION      KY INJECTION ANES LMBR/THRC PARAVERTBRL SYMPATHETIC Bilateral 12/28/2017    Procedure: SPLANCHNIC NERVE BLOCK;  Surgeon: Ramiro Chinchilla MD;  [facility];  Service: Pain Management     KY INJX ANES CELIAC PLEXUS W/WO RADIOLOGIC MONITRNG Bilateral 05/09/2017    Procedure: CELIAC PLEXUS BLOCK ;  Surgeon: Ramiro Chinchilla MD;  [facility];  Service: Pain Management     KY INJX ANES CELIAC PLEXUS W/WO RADIOLOGIC MONITRNG Bilateral 06/01/2017    Procedure: SPLANCHNIC NERVE BLOCK at T12;  Surgeon: Ramiro Chinchilla MD;  [facility];  Service: Pain Management     KY INJX ANES CELIAC PLEXUS W/WO RADIOLOGIC MONITRNG Bilateral 08/08/2017    Procedure: BILATERAL SPLANCHNIC NERVE BLOCK T12;  Surgeon: Ramiro Chinchilla MD;  [facility];   Service: Pain Management     AR INJX ANES CELIAC PLEXUS W/WO RADIOLOGIC MONITRNG Bilateral 04/18/2019    Procedure: BLOCK / INJECTION CELIAC PLEXUS;  Surgeon: Ramiro Chinchilla MD;  [facility];  Service: Pain Management     AR INJX ANES CELIAC PLEXUS W/WO RADIOLOGIC MONITRNG Bilateral 08/20/2019    Procedure: SPLANCHNIC NERVE BLOCK;  Surgeon: Ramiro Chinchilla MD;  [facility];  Service: Pain Management     AR INJX ANES CELIAC PLEXUS W/WO RADIOLOGIC MONITRNG Bilateral 10/17/2019    Procedure: SPLANCHNIC NERVE BLOCK;  Surgeon: Ramiro Chinchilla MD;  [facility];  Service: Pain Management     AR LAPAROSCOPY SURG CHOLECYSTECTOMY N/A 02/14/2017    Procedure: LAPAROSCOPIC CHOLECYSTECTOMY, IOC, POSSIBLE OPEN.;  Surgeon: Suresh Lang MD;  [facility];  Service: General    AR SURGICAL ARTHROSCOPY SHOULDER W/ROTATOR CUFF RPR Left 06/09/2023    Procedure: Left Shoulder Arthroscopic Rotator Cuff Repair, Open Subpectoral Biceps Tenodesis, Acromioplasty, Extensive Debridement;  Surgeon: Toro Healy DO;  [facility];  Service: Orthopedics    ROTATOR CUFF REPAIR Right     SHOULDER ARTHROSCOPY      SHOULDER ARTHROSCOPY Right     SHOULDER SURGERY      TENDON REPAIR  2015    Right shoulder      Family History   Problem Relation Age of Onset    Cirrhosis Mother     Heart disease Other         cardiac disorder    Cancer Other       Social History     Tobacco Use    Smoking status: Former     Current packs/day: 0.00     Average packs/day: 1 pack/day for 20.0 years (20.0 ttl pk-yrs)     Types: Cigarettes     Start date: 3/15/2001     Quit date: 3/15/2021     Years since quitting: 3.8    Smokeless tobacco: Never   Vaping Use    Vaping status: Some Days    Substances: Nicotine   Substance Use Topics    Alcohol use: Yes     Alcohol/week: 1.0 standard drink of alcohol     Types: 1 Cans of beer per week     Comment: on weekends    Drug use: No      E-Cigarette/Vaping    E-Cigarette Use Current  Some Day User       E-Cigarette/Vaping Substances    Nicotine Yes     THC No     CBD No     Flavoring No     Other No     Unknown No       I have reviewed and agree with the history as documented.     49-year-old male with history of chronic pain disorder, chronic pancreatitis, cirrhosis, GERD, hyperlipidemia, hypertension, liver abscess presents to ED for evaluation of right thigh pain, tingling of lower extremities.  Patient states that today he has been experiencing right thigh pain.  Was already seen in the ED today for evaluation of this. Patient had reassuring workup.  Negative duplex ultrasound of the right leg.  X-ray of right femur obtained and it showed avascular necrosis and possible femoral acetabular impingement.  He was given crutches to use with ambulation as the avascular necrosis was seen.  Patient declined taking any pain medications during this visit and following the visit.  Patient states he is opposed to using pain medications.  Patient wondering why he still has right thigh pain.  Denies any recent falls.  Denies change in activity.  Pain has been present the past couple days but significantly worsened today.  Denies testicular pain.  Denies penile lesions.  Denies urinary symptoms.    In regards to the bilateral lower extremity tingling, patient concerned about DVT.  States that he understands the duplex was negative earlier today but he is extra concerned about blood clot due to recently losing a close friend to DVT complication.  States that his feet feel tingly.  Denies loss of motor function of lower extremities.  Denies any open wounds of the lower extremities.  Denies fever, chills, shortness of breath, chest pain, seizure, syncope.                 Review of Systems   Musculoskeletal:         Positive right thigh pain   Neurological:  Positive for numbness.   All other systems reviewed and are negative.          Objective       ED Triage Vitals [01/14/25 0221]   Temperature Pulse Blood  Pressure Respirations SpO2 Patient Position - Orthostatic VS   97.8 °F (36.6 °C) 75 (!) 173/74 18 99 % --      Temp src Heart Rate Source BP Location FiO2 (%) Pain Score    -- Monitor -- -- --      Vitals      Date and Time Temp Pulse SpO2 Resp BP Pain Score FACES Pain Rating User   01/14/25 0300 -- 77 96 % 18 120/73 -- -- KG   01/14/25 0221 97.8 °F (36.6 °C) 75 99 % 18 173/74 -- -- KG            Physical Exam  Vitals and nursing note reviewed.   Constitutional:       General: He is not in acute distress.     Appearance: Normal appearance. He is well-developed. He is not ill-appearing, toxic-appearing or diaphoretic.   HENT:      Head: Normocephalic and atraumatic.   Eyes:      Conjunctiva/sclera: Conjunctivae normal.   Cardiovascular:      Rate and Rhythm: Normal rate and regular rhythm.      Pulses: Normal pulses.      Heart sounds: Normal heart sounds. No murmur heard.     No friction rub. No gallop.   Pulmonary:      Effort: Pulmonary effort is normal. No respiratory distress.      Breath sounds: Normal breath sounds. No stridor. No wheezing, rhonchi or rales.   Abdominal:      Palpations: Abdomen is soft.      Tenderness: There is no abdominal tenderness.   Musculoskeletal:      Cervical back: Neck supple.      Right lower leg: No edema.      Left lower leg: No edema.        Legs:       Comments: Hyperpigmentation of bilateral ankles.  Hairless feet.  2+ DP and PT pulses.  Brisk capillary refill.  Able to dorsiflex, plantarflex without difficulty.  Touch sensation intact of bilateral lower extremities.   Skin:     General: Skin is warm and dry.      Capillary Refill: Capillary refill takes less than 2 seconds.      Findings: No rash.   Neurological:      Mental Status: He is alert.   Psychiatric:         Mood and Affect: Mood normal.         Results Reviewed       None            No orders to display       Procedures    ED Medication and Procedure Management   Prior to Admission Medications   Prescriptions Last  Dose Informant Patient Reported? Taking?   Choline Fenofibrate (Fenofibric Acid) 135 MG CPDR  Self No No   Sig: TAKE ONE CAPSULE BY MOUTH EVERY DAY   Empagliflozin 10 MG TABS  Self Yes No   Sig: Take 10 mg by mouth every morning   Insulin Pen Needle (Pen Needles) 32G X 5 MM MISC  Self Yes No   Sig: use once daily as directed   Multiple Vitamins-Minerals (MULTIVITAMIN ADULTS PO)  Self Yes No   Sig: Take by mouth daily after breakfast   NON FORMULARY  Self Yes No   Sig: Pantessin 1 capsule daily   Pancrelipase, Lip-Prot-Amyl, (Creon) 11343-562443 units CPEP  Self No No   Sig: TAKE 1 CAPSULE (36,000 UNITS TOTAL) BY MOUTH 3 (THREE) TIMES A DAY BEFORE MEALS   TURMERIC PO  Self Yes No   Sig: Take by mouth in the morning   albuterol (PROVENTIL HFA,VENTOLIN HFA) 90 mcg/act inhaler  Self No No   Sig: INHALE TWO PUFFS BY MOUTH EVERY 6 HOURS AS NEEDED FOR WHEEZING   amLODIPine (NORVASC) 5 mg tablet  Self No No   Sig: Take 1 tablet (5 mg total) by mouth daily   busPIRone (BUSPAR) 5 mg tablet  Self No No   Sig: Take 1 tablet (5 mg total) by mouth 2 (two) times a day   esomeprazole (NexIUM) 40 MG capsule  Self No No   Sig: Take 1 capsule (40 mg total) by mouth 2 (two) times a day before meals   famotidine (PEPCID) 40 MG tablet  Self Yes No   Sig: Take 40 mg by mouth daily at bedtime as needed    fluticasone (FLONASE) 50 mcg/act nasal spray  Self No No   Si spray into each nostril daily   insulin degludec (Tresiba FlexTouch) 100 units/mL injection pen  Self Yes No   Si units subcut in hs   lisinopril (ZESTRIL) 20 mg tablet  Self No No   Sig: TAKE ONE TABLET BY MOUTH EVERY DAY   mirtazapine (REMERON) 7.5 MG tablet  Self No No   Sig: Take 1 tablet (7.5 mg total) by mouth daily at bedtime   omega-3-acid ethyl esters (LOVAZA) 1 g capsule  Self No No   Sig: TAKE TWO CAPSULES BY MOUTH TWICE DAILY   Patient not taking: Reported on 2024   ondansetron (ZOFRAN-ODT) 4 mg disintegrating tablet  Self No No   Sig: Take 1 tablet (4  mg total) by mouth every 8 (eight) hours as needed for nausea or vomiting   pancrelipase, Lip-Prot-Amyl, (Creon) 12,000 units capsule  Self No No   Sig: Take 12,000 units of lipase by mouth 3 (three) times a day as needed for snacks   propranolol (INDERAL) 20 mg tablet  Self No No   Sig: TAKE 1 TABLET BY MOUTH TWICE DAILY   rosuvastatin (CRESTOR) 40 MG tablet  Self No No   Sig: TAKE ONE TABLET BY MOUTH EVERY DAY      Facility-Administered Medications: None     Discharge Medication List as of 1/14/2025  3:33 AM        CONTINUE these medications which have NOT CHANGED    Details   albuterol (PROVENTIL HFA,VENTOLIN HFA) 90 mcg/act inhaler INHALE TWO PUFFS BY MOUTH EVERY 6 HOURS AS NEEDED FOR WHEEZING, Normal      amLODIPine (NORVASC) 5 mg tablet Take 1 tablet (5 mg total) by mouth daily, Starting Wed 6/26/2024, Normal      busPIRone (BUSPAR) 5 mg tablet Take 1 tablet (5 mg total) by mouth 2 (two) times a day, Starting Wed 12/4/2024, Normal      Choline Fenofibrate (Fenofibric Acid) 135 MG CPDR TAKE ONE CAPSULE BY MOUTH EVERY DAY, Starting Sat 6/22/2024, Normal      Empagliflozin 10 MG TABS Take 10 mg by mouth every morning, Historical Med      esomeprazole (NexIUM) 40 MG capsule Take 1 capsule (40 mg total) by mouth 2 (two) times a day before meals, Starting Tue 11/19/2024, Normal      famotidine (PEPCID) 40 MG tablet Take 40 mg by mouth daily at bedtime as needed , Historical Med      fluticasone (FLONASE) 50 mcg/act nasal spray 1 spray into each nostril daily, Starting Mon 2/5/2024, Normal      insulin degludec (Tresiba FlexTouch) 100 units/mL injection pen 20 units subcut in hs, Historical Med      Insulin Pen Needle (Pen Needles) 32G X 5 MM MISC use once daily as directed, Historical Med      lisinopril (ZESTRIL) 20 mg tablet TAKE ONE TABLET BY MOUTH EVERY DAY, Starting Sun 8/11/2024, Normal      mirtazapine (REMERON) 7.5 MG tablet Take 1 tablet (7.5 mg total) by mouth daily at bedtime, Starting Wed 12/4/2024,  Normal      Multiple Vitamins-Minerals (MULTIVITAMIN ADULTS PO) Take by mouth daily after breakfast, Historical Med      NON FORMULARY Pantessin 1 capsule daily, Historical Med      omega-3-acid ethyl esters (LOVAZA) 1 g capsule TAKE TWO CAPSULES BY MOUTH TWICE DAILY, Normal      ondansetron (ZOFRAN-ODT) 4 mg disintegrating tablet Take 1 tablet (4 mg total) by mouth every 8 (eight) hours as needed for nausea or vomiting, Starting Wed 10/30/2024, Normal      pancrelipase, Lip-Prot-Amyl, (Creon) 12,000 units capsule Take 12,000 units of lipase by mouth 3 (three) times a day as needed for snacks, Starting Tue 6/25/2024, Until Mon 9/30/2024 at 2359, Normal      Pancrelipase, Lip-Prot-Amyl, (Creon) 39767-052913 units CPEP TAKE 1 CAPSULE (36,000 UNITS TOTAL) BY MOUTH 3 (THREE) TIMES A DAY BEFORE MEALS, Starting Tue 9/10/2024, Normal      propranolol (INDERAL) 20 mg tablet TAKE 1 TABLET BY MOUTH TWICE DAILY, Normal      rosuvastatin (CRESTOR) 40 MG tablet TAKE ONE TABLET BY MOUTH EVERY DAY, Starting Fri 7/5/2024, Normal      TURMERIC PO Take by mouth in the morning, Historical Med             ED SEPSIS DOCUMENTATION   Time reflects when diagnosis was documented in both MDM as applicable and the Disposition within this note       Time User Action Codes Description Comment    1/14/2025  3:31 AM Mello Moore Add [M87.051] Avascular necrosis of right femur (HCC)                  Mello Moore PA-C  01/16/25 0223

## 2025-01-14 NOTE — Clinical Note
Ashwin Wright was seen and treated in our emergency department on 1/14/2025.    No restrictions            Diagnosis:     Ashwin  may return to work on return date.    He may return on this date: 01/16/2025         If you have any questions or concerns, please don't hesitate to call.      Mello Moore PA-C    ______________________________           _______________          _______________  Hospital Representative                              Date                                Time

## 2025-01-14 NOTE — DISCHARGE INSTRUCTIONS
Follow-up with orthopedics to discuss avascular necrosis seen on x-ray this past afternoon.  Use the provided crutches with ambulation to help keep weight off of the right leg.  In regards to bilateral numbness of your legs, follow-up with your primary care provider to discuss possibility of peripheral vascular disease.

## 2025-01-16 ENCOUNTER — OFFICE VISIT (OUTPATIENT)
Dept: OBGYN CLINIC | Facility: CLINIC | Age: 50
End: 2025-01-16
Payer: COMMERCIAL

## 2025-01-16 VITALS — WEIGHT: 187 LBS | HEIGHT: 67 IN | BODY MASS INDEX: 29.35 KG/M2

## 2025-01-16 DIAGNOSIS — M87.051 AVASCULAR NECROSIS OF RIGHT FEMUR (HCC): Primary | ICD-10-CM

## 2025-01-16 DIAGNOSIS — M25.859 FEMORAL ACETABULAR IMPINGEMENT: ICD-10-CM

## 2025-01-16 DIAGNOSIS — M70.61 TROCHANTERIC BURSITIS OF RIGHT HIP: ICD-10-CM

## 2025-01-16 PROCEDURE — 20610 DRAIN/INJ JOINT/BURSA W/O US: CPT | Performed by: FAMILY MEDICINE

## 2025-01-16 PROCEDURE — 99214 OFFICE O/P EST MOD 30 MIN: CPT | Performed by: FAMILY MEDICINE

## 2025-01-16 RX ORDER — TRIAMCINOLONE ACETONIDE 40 MG/ML
40 INJECTION, SUSPENSION INTRA-ARTICULAR; INTRAMUSCULAR
Status: COMPLETED | OUTPATIENT
Start: 2025-01-16 | End: 2025-01-16

## 2025-01-16 RX ORDER — LIDOCAINE HYDROCHLORIDE 10 MG/ML
4 INJECTION, SOLUTION INFILTRATION; PERINEURAL
Status: COMPLETED | OUTPATIENT
Start: 2025-01-16 | End: 2025-01-16

## 2025-01-16 RX ADMIN — TRIAMCINOLONE ACETONIDE 40 MG: 40 INJECTION, SUSPENSION INTRA-ARTICULAR; INTRAMUSCULAR at 10:00

## 2025-01-16 RX ADMIN — LIDOCAINE HYDROCHLORIDE 4 ML: 10 INJECTION, SOLUTION INFILTRATION; PERINEURAL at 10:00

## 2025-01-16 NOTE — LETTER
January 16, 2025     Patient: Ashwin Wright  YOB: 1975  Date of Visit: 1/16/2025      To Whom it May Concern:    Ashwin Wright is under my professional care. Ashwin was seen in my office on 1/16/2025.Please excuse from work until 1/20/25    If you have any questions or concerns, please don't hesitate to call.         Sincerely,          Sammy Garcia,         CC: No Recipients

## 2025-01-16 NOTE — PROGRESS NOTES
"    Chief Complaint   Patient presents with    Right Hip - Pain     Makes popping sound    Right Thigh - Pain       Ashwin Wright is a 49 y.o. male complains of right hip pain. Onset of the symptoms was several days ago.  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 Aggravating factors: direct pressure. Treatment to date: avoidance of offending activity. Symptoms have progressed to a point and plateaued.    The following portions of the patient's history were reviewed and updated as appropriate: allergies, current medications, past family history, past medical history, past social history, past surgical history and problem list.    Occupation:    Review of Systems   Constitutional: Negative for fever.   HENT: Negative for dental problem and headaches.    Eyes: Negative for vision loss.   Respiratory: Negative for cough and shortness of breath.    Cardiovascular: Negative for leg swelling and palpitations.   Gastrointestinal: Negative for constipation and diarrhea.   Genitourinary: Negative for bladder incontinence and difficulty urinating.   Musculoskeletal: Negative for back pain and difficulty walking.   Skin: Negative for rash and ulcer.   Neurological: Negative for dizziness and headaches.   Hem/Lymph/Immuno: Negative for blood clots. Does not bruise/bleed easily.   Psychiatric/Behavioral: Negative for confusion.         Objective:  Ht 5' 7\" (1.702 m)   Wt 84.8 kg (187 lb)   BMI 29.29 kg/m²     Skin: no rashes, lesions, skin discolorations, lacerations  Vasculature: normal popliteal and pedal pulse, normal skin color, normal capillary refill in extremity, no lower extremity edema  Neurologic: Neurologic exam is normal throughout lower extremities, Awake, alert, and oriented x3, no apparent distress.    Neuro: no weakness in the L4-S1 nerve distribution  Musculoskeletal:  Inspection: no observable abnormalities  Palpation + tenderness to palpation over " greater trochanter  ROM: Full flexion and extension of the hip. full internal and external rotation  Special tests: negative FADIR and DARCY test  Negative straight leg raise  No sensory deficit in the lower extremity          Imaging:       Assessment/Plan:  1. Avascular necrosis of right femur (HCC) (Primary)    - Ambulatory Referral to Orthopedic Surgery  - MRI hip right wo contrast; Future    2. Trochanteric bursitis of right hip    - Large joint arthrocentesis: R greater trochanteric bursa    3. Femoral acetabular impingement        > 45 min devoted to review of previous, pertinent medical records, imaging, discussion of treatment options, counseling and documentation  Imaging independently reviewed and discussed with patient.  The hip x-rays from the emergency room are reviewed which revealed notable cam deformity of the right hip with developing degenerative changes.  There was concern for possible avascular necrosis of the femoral head.  Patient's examination reveals predominance of pain symptoms over the trochanteric bursa and gluteal muscle belly.  He does not have much pain reproduction with FADIR maneuver or internal rotation of the hip.  My clinical impression is that his primary pain generator seems to be arising from trochanteric bursitis.  We discussed the nature of trochanteric bursitis at length and detailed the treatment approach.  Directed to ice the area daily for 20 minutes at a time using a barrier to protect the skin- stressed specifically icing after activity to address inflammation  CSI performed for trochanteric bursa today's office visit.  We did have an in-depth discussion however that given his radiographic findings of AVN I would like to have him follow-up with an MRI of the right hip as may require surgical intervention.

## 2025-01-16 NOTE — PROGRESS NOTES
Large joint arthrocentesis: R greater trochanteric bursa  Universal Protocol:  Consent: Verbal consent obtained.  Risks and benefits: risks, benefits and alternatives were discussed  Consent given by: patient  Patient identity confirmed: verbally with patient  Supporting Documentation  Indications: pain   Procedure Details  Location: hip - R greater trochanteric bursa  Needle size: 22 G  Ultrasound guidance: no  Approach: lateral  Medications administered: 4 mL lidocaine 1 %; 40 mg triamcinolone acetonide 40 mg/mL    Patient tolerance: patient tolerated the procedure well with no immediate complications    Risks and benefits of CSI were discussed with patient extensively. Risks were highlighted which included but were not limited to infection, pain, local site swelling, and chance that injection may not be effective. Patient was also counseled regarding glucose elevation days after receiving CSI and to be mindful of diet and check sugars daily. Patient agreeable to proceed with CSI after counseling.

## 2025-01-21 ENCOUNTER — TELEPHONE (OUTPATIENT)
Age: 50
End: 2025-01-21

## 2025-01-21 NOTE — TELEPHONE ENCOUNTER
Pt's wife, Geraldine, calling in requesting a sooner appointment with Dinora Mensah than the 2/6/25 as previously scheduled. Pt discharged today from [facility] after being admitted for UGI bleed from bleeding varices. Scheduled an urgent appointment for 1/30/25. Reviewed with wife what s/s to look out for if pt starts bleeding again. She verbalized understanding.

## 2025-01-23 ENCOUNTER — OFFICE VISIT (OUTPATIENT)
Dept: FAMILY MEDICINE CLINIC | Facility: CLINIC | Age: 50
End: 2025-01-23
Payer: COMMERCIAL

## 2025-01-23 VITALS
DIASTOLIC BLOOD PRESSURE: 70 MMHG | SYSTOLIC BLOOD PRESSURE: 120 MMHG | OXYGEN SATURATION: 98 % | RESPIRATION RATE: 18 BRPM | HEART RATE: 78 BPM | WEIGHT: 189 LBS | HEIGHT: 67 IN | BODY MASS INDEX: 29.66 KG/M2

## 2025-01-23 DIAGNOSIS — K74.60 CIRRHOSIS OF LIVER WITHOUT ASCITES, UNSPECIFIED HEPATIC CIRRHOSIS TYPE (HCC): Primary | ICD-10-CM

## 2025-01-23 DIAGNOSIS — K92.2 GASTROINTESTINAL HEMORRHAGE, UNSPECIFIED GASTROINTESTINAL HEMORRHAGE TYPE: ICD-10-CM

## 2025-01-23 DIAGNOSIS — F10.21 ALCOHOL DEPENDENCE IN REMISSION (HCC): ICD-10-CM

## 2025-01-23 PROCEDURE — 99496 TRANSJ CARE MGMT HIGH F2F 7D: CPT | Performed by: FAMILY MEDICINE

## 2025-01-23 RX ORDER — CARVEDILOL 3.12 MG/1
3.12 TABLET ORAL
COMMUNITY
Start: 2025-01-21 | End: 2025-02-20

## 2025-01-23 RX ORDER — LACTULOSE 10 G/15ML
SOLUTION ORAL
COMMUNITY
Start: 2025-01-21

## 2025-01-23 RX ORDER — B-COMPLEX WITH VITAMIN C
250 TABLET ORAL DAILY
COMMUNITY
Start: 2025-01-22 | End: 2025-02-21

## 2025-01-23 NOTE — PROGRESS NOTES
Transition of Care Visit  Name: Ashwin Wright      : 1975      MRN: [id number]  Encounter Provider: LUANA Mejía  Encounter Date: 2025   Encounter department: [facility] [address] [place] [address]    Assessment & Plan  Cirrhosis of liver without ascites, unspecified hepatic cirrhosis type (HCC)  Discussed recommendations discharge, continuation of lactulose, obtain updated CMP ammonia levels prior to GI eval  Orders:    Comprehensive metabolic panel; Future    Ammonia; Future    Gastrointestinal hemorrhage, unspecified gastrointestinal hemorrhage type  Presently asymptomatic, negative nausea vomiting abdominal pain, to  follow-up GI, obtain updated CBC  Orders:    CBC and differential; Future    Alcohol dependence in remission (HCC)  Stressed the importance of total abstinence, discussed enrollment in patient alcohol detox program, [facility] drug and alcohol resources  Orders:    Comprehensive metabolic panel; Future    Glutamic acid decarboxylase; Future           History of Present Illness     Transitional Care Management Review:   Ashwin Wright is a 49 y.o. male here for TCM follow up.     During the TCM phone call patient stated:  TCM Call       Date and time call was made  2023  3:29 PM    Hospital care reviewed  Records reviewed    Patient was hospitialized at  [facility]    Date of Admission  23    Date of discharge  23    Diagnosis  Constipation    Disposition  Home    Were the patients medications reviewed and updated  Yes    Current Symptoms  None    Middle abdominal pain severity  Mild    Middle abdominal pain onset  Gradual  pain is bareable          TCM Call       Post hospital issues  Reduced activity    Should patient be enrolled in anticoag monitoring?  No    Scheduled for follow up?  Yes    Patient refusal reason  Patient refused to schedule stating that he has the specialist appointment's to go to first and then maybe he will  schedule.    Patients specialists  Other (comment)    Other specialists names  Dr. Farias    Other specialists contcat #  Gastro    Referrals needed  no    Did you obtain your prescribed medications  Yes    Do you need help managing your prescriptions or medications  No    Is transportation to your appointment needed  No    I have advised the patient to call PCP with any new or worsening symptoms  Celestina Averyhoelton RMA    Living Arrangements  Family members    Are you recieving any outpatient services  No    Are you recieving home care services  No    Are you using any community resources  No    Current waiver services  No    Have you fallen in the last 12 months  No    Interperter language line needed  No    Counseling  Family    Counseling topics  Importance of RX compliance          Tcm  F/u hosp admit [facility] , 1/16/2025, ED via EMS w c/o vomiting dark red blood since last night (roughly 3-4 times) . Pt has hx of cirrhosis/pancreatitis/gerd   Incidental finding of avascular nec , left em head , without pain ,, = f/u with ortho  Gi f/u scheduled for   Egd completed   Etoh admits prior to admit excessive intake vodka  Denies any further bleeding negative vomiting negative abdominal pain swelling to extremities  Spouse concerned patient at times appears to be hallucinating, although normal in faculties presently, has refused to continue lactulose  Questions change in medication from hospital  Wife concerned the patient has hidden alcohol within the house?         Review of Systems   Constitutional:  Negative for appetite change, chills, fever and unexpected weight change.   HENT:  Negative for congestion, dental problem, ear pain, hearing loss, postnasal drip, rhinorrhea, sinus pressure, sinus pain, sneezing, sore throat, tinnitus and voice change.    Eyes:  Negative for visual disturbance.   Respiratory:  Negative for apnea, cough, chest tightness and shortness of breath.    Cardiovascular:  Negative for chest pain,  "palpitations and leg swelling.   Gastrointestinal:  Negative for abdominal pain, blood in stool, constipation, diarrhea, nausea and vomiting.   Endocrine: Negative for cold intolerance, heat intolerance, polydipsia, polyphagia and polyuria.   Genitourinary:  Negative for decreased urine volume, difficulty urinating, dysuria, frequency and hematuria.   Musculoskeletal:  Negative for arthralgias, back pain, gait problem, joint swelling and myalgias.   Skin:  Negative for color change, rash and wound.   Allergic/Immunologic: Negative for environmental allergies and food allergies.   Neurological:  Negative for dizziness, syncope, weakness, light-headedness, numbness and headaches.   Hematological:  Negative for adenopathy. Does not bruise/bleed easily.   Psychiatric/Behavioral:  Negative for sleep disturbance and suicidal ideas. The patient is not nervous/anxious.      Objective   /70 (BP Location: Left arm, Patient Position: Sitting)   Pulse 78   Resp 18   Ht 5' 7\" (1.702 m)   Wt 85.7 kg (189 lb)   SpO2 98%   BMI 29.60 kg/m²     Physical Exam  Constitutional:       General: He is not in acute distress.     Appearance: He is well-developed. He is not ill-appearing.   HENT:      Head: Normocephalic and atraumatic.   Cardiovascular:      Rate and Rhythm: Normal rate and regular rhythm.      Heart sounds: Normal heart sounds.   Pulmonary:      Effort: Pulmonary effort is normal.      Breath sounds: Normal breath sounds.   Abdominal:      General: Bowel sounds are normal.      Palpations: Abdomen is soft.   Musculoskeletal:         General: Normal range of motion.      Cervical back: Normal range of motion and neck supple.   Lymphadenopathy:      Cervical: No cervical adenopathy.   Skin:     General: Skin is warm and dry.   Neurological:      General: No focal deficit present.      Mental Status: He is alert and oriented to person, place, and time.      Deep Tendon Reflexes: Reflexes are normal and symmetric. "   Psychiatric:         Attention and Perception: Attention and perception normal.         Mood and Affect: Mood normal.         Speech: Speech normal.         Behavior: Behavior normal. Behavior is cooperative.         Thought Content: Thought content normal.         Cognition and Memory: Cognition normal.         Judgment: Judgment normal.       Medications have been reviewed by provider in current encounter

## 2025-01-23 NOTE — ASSESSMENT & PLAN NOTE
Discussed recommendations discharge, continuation of lactulose, obtain updated CMP ammonia levels prior to GI eval  Orders:    Comprehensive metabolic panel; Future    Ammonia; Future

## 2025-01-24 ENCOUNTER — TELEPHONE (OUTPATIENT)
Dept: GASTROENTEROLOGY | Facility: CLINIC | Age: 50
End: 2025-01-24

## 2025-01-27 ENCOUNTER — TELEPHONE (OUTPATIENT)
Age: 50
End: 2025-01-27

## 2025-01-27 NOTE — TELEPHONE ENCOUNTER
Patients wife states when patient was in for an appt yesterday that Bill was going to order a CT and MRI of his head? Wife doesn't see an order, please advise

## 2025-01-28 ENCOUNTER — RESULTS FOLLOW-UP (OUTPATIENT)
Dept: FAMILY MEDICINE CLINIC | Facility: CLINIC | Age: 50
End: 2025-01-28

## 2025-01-28 ENCOUNTER — HOSPITAL ENCOUNTER (EMERGENCY)
Facility: HOSPITAL | Age: 50
Discharge: HOME | End: 2025-01-28
Attending: EMERGENCY MEDICINE | Admitting: EMERGENCY MEDICINE
Payer: COMMERCIAL

## 2025-01-28 VITALS
RESPIRATION RATE: 20 BRPM | WEIGHT: 174.4 LBS | HEART RATE: 74 BPM | DIASTOLIC BLOOD PRESSURE: 62 MMHG | TEMPERATURE: 98 F | SYSTOLIC BLOOD PRESSURE: 140 MMHG | BODY MASS INDEX: 27.37 KG/M2 | HEIGHT: 67 IN | OXYGEN SATURATION: 97 %

## 2025-01-28 DIAGNOSIS — E80.6 HYPERBILIRUBINEMIA: ICD-10-CM

## 2025-01-28 DIAGNOSIS — K74.60 CIRRHOSIS (HCC): Primary | ICD-10-CM

## 2025-01-28 DIAGNOSIS — D64.9 ANEMIA: ICD-10-CM

## 2025-01-28 LAB
ALBUMIN SERPL BCG-MCNC: 3.3 G/DL (ref 3.5–5)
ALP SERPL-CCNC: 97 U/L (ref 34–104)
ALT SERPL W P-5'-P-CCNC: 18 U/L (ref 7–52)
AMMONIA PLAS-SCNC: 42 UMOL/L (ref 18–72)
ANION GAP SERPL CALCULATED.3IONS-SCNC: 6 MMOL/L (ref 4–13)
AST SERPL W P-5'-P-CCNC: 50 U/L (ref 13–39)
BASOPHILS # BLD AUTO: 0.07 THOUSANDS/ΜL (ref 0–0.1)
BASOPHILS NFR BLD AUTO: 1 % (ref 0–1)
BILIRUB DIRECT SERPL-MCNC: 0.59 MG/DL (ref 0–0.2)
BILIRUB SERPL-MCNC: 1.62 MG/DL (ref 0.2–1)
BUN SERPL-MCNC: 7 MG/DL (ref 5–25)
CALCIUM SERPL-MCNC: 8.3 MG/DL (ref 8.4–10.2)
CHLORIDE SERPL-SCNC: 109 MMOL/L (ref 96–108)
CO2 SERPL-SCNC: 26 MMOL/L (ref 21–32)
CREAT SERPL-MCNC: 0.55 MG/DL (ref 0.6–1.3)
EOSINOPHIL # BLD AUTO: 0.09 THOUSAND/ΜL (ref 0–0.61)
EOSINOPHIL NFR BLD AUTO: 2 % (ref 0–6)
ERYTHROCYTE [DISTWIDTH] IN BLOOD BY AUTOMATED COUNT: 17.7 % (ref 11.6–15.1)
GFR SERPL CREATININE-BSD FRML MDRD: 120 ML/MIN/1.73SQ M
GLUCOSE SERPL-MCNC: 129 MG/DL (ref 65–140)
HCT VFR BLD AUTO: 29.9 % (ref 36.5–49.3)
HGB BLD-MCNC: 9.4 G/DL (ref 12–17)
IMM GRANULOCYTES # BLD AUTO: 0.02 THOUSAND/UL (ref 0–0.2)
IMM GRANULOCYTES NFR BLD AUTO: 0 % (ref 0–2)
LYMPHOCYTES # BLD AUTO: 1.34 THOUSANDS/ΜL (ref 0.6–4.47)
LYMPHOCYTES NFR BLD AUTO: 25 % (ref 14–44)
MCH RBC QN AUTO: 30.2 PG (ref 26.8–34.3)
MCHC RBC AUTO-ENTMCNC: 31.4 G/DL (ref 31.4–37.4)
MCV RBC AUTO: 96 FL (ref 82–98)
MONOCYTES # BLD AUTO: 0.77 THOUSAND/ΜL (ref 0.17–1.22)
MONOCYTES NFR BLD AUTO: 15 % (ref 4–12)
NEUTROPHILS # BLD AUTO: 3.02 THOUSANDS/ΜL (ref 1.85–7.62)
NEUTS SEG NFR BLD AUTO: 57 % (ref 43–75)
NRBC BLD AUTO-RTO: 0 /100 WBCS
PLATELET # BLD AUTO: 150 THOUSANDS/UL (ref 149–390)
PMV BLD AUTO: 11 FL (ref 8.9–12.7)
POTASSIUM SERPL-SCNC: 3.7 MMOL/L (ref 3.5–5.3)
PROT SERPL-MCNC: 6.6 G/DL (ref 6.4–8.4)
RBC # BLD AUTO: 3.11 MILLION/UL (ref 3.88–5.62)
SODIUM SERPL-SCNC: 141 MMOL/L (ref 135–147)
WBC # BLD AUTO: 5.31 THOUSAND/UL (ref 4.31–10.16)

## 2025-01-28 PROCEDURE — 85025 COMPLETE CBC W/AUTO DIFF WBC: CPT | Performed by: EMERGENCY MEDICINE

## 2025-01-28 PROCEDURE — 80048 BASIC METABOLIC PNL TOTAL CA: CPT | Performed by: EMERGENCY MEDICINE

## 2025-01-28 PROCEDURE — 80076 HEPATIC FUNCTION PANEL: CPT | Performed by: EMERGENCY MEDICINE

## 2025-01-28 PROCEDURE — 36415 COLL VENOUS BLD VENIPUNCTURE: CPT | Performed by: EMERGENCY MEDICINE

## 2025-01-28 PROCEDURE — 82140 ASSAY OF AMMONIA: CPT | Performed by: EMERGENCY MEDICINE

## 2025-01-28 PROCEDURE — 99284 EMERGENCY DEPT VISIT MOD MDM: CPT | Performed by: EMERGENCY MEDICINE

## 2025-01-28 PROCEDURE — 99283 EMERGENCY DEPT VISIT LOW MDM: CPT

## 2025-01-28 NOTE — TELEPHONE ENCOUNTER
Patient's wife calling about previous message regarding imaging of head and also now about patient's most recent labs. CBC and ammonia levels. Patient's wife has many questions regarding patient's care and would like to discuss further with Toro Phillips NP. Please advise.

## 2025-01-28 NOTE — ED PROVIDER NOTES
Time reflects when diagnosis was documented in both MDM as applicable and the Disposition within this note       Time User Action Codes Description Comment    1/28/2025  5:28 PM Jasmyne Maloney [K74.60] Cirrhosis (HCC)     1/28/2025  5:28 PM Jasmyne Maloney [E80.6] Hyperbilirubinemia     1/28/2025  5:28 PM Jasmyne Maloney [D64.9] Anemia           ED Disposition       ED Disposition   Discharge    Condition   Fair    Date/Time   Tue Jan 28, 2025  5:28 PM    Comment   Ashwin Wright discharge to home/self care.             Assessment & Plan       Medical Decision Making  This is a 49-year-old male who presents here today for evaluation of abnormal lab work.  He was admitted from 1/26 to 1/21 at [facility] or GI bleed related to esophageal varices, and hepatic encephalopathy.  He had a follow-up visit with his doctor 1/23, and had repeat lab work drawn yesterday.  He received a call that he was more anemic and that his ammonia was back up from discharge.  The patient denies any recurrent hematemesis, melena, hematochezia.  He denies any abdominal pain or other complaints.  His wife says that when they saw his doctor last week he was hallucinating, and had been since shortly after discharge.  He had not been taking the lactulose when he left the hospital as he was not aware he had to, and she says this has resolved and he has gotten back to baseline.  There is no somnolence, or other abnormal behavior.  He says he has been being his bowels about 1-2 times a day which is normal for him, and has not changed since restarting the lactulose.  He had lab work drawn yesterday that resulted today, which showed hemoglobin of 9.8 and ammonia of 84.  He had an ammonia of 42 and hemoglobin of 8.9 on discharge from [facility] 1/21.     Review of systems: Otherwise negative unless stated as above    He is well-appearing, no acute distress.  Exam is unremarkable. Hemoglobin has  actually increased from discharge and is likely due to cessation of bleeding.  Ammonia did increase, however he was not taking his lactulose and has had symptom improvement since restarting it, so is likely a decrease from where it was last week when he was symptomatic from it.  However, we will repeat labs today to ensure he is not having persistent anemia, that ammonia is improving, and he is not having any hepatic failure contributing to worsening things.    Hemoglobin today is 9.4, essentially unchanged.  Bilirubin is 1.6 with AST of 50, both improved from recent values at [facility].  Ammonia today is down to 42.  I discussed with patient and wife continued use of lactulose, follow-up with GI later this week as scheduled, and indications for return, and they expressed understanding with this plan.    Problems Addressed:  Anemia: acute illness or injury  Cirrhosis (HCC): chronic illness or injury  Hyperbilirubinemia: chronic illness or injury    Amount and/or Complexity of Data Reviewed  Independent Historian: spouse  External Data Reviewed: labs and notes.  Labs: ordered. Decision-making details documented in ED Course.             Medications - No data to display    ED Risk Strat Scores                          SBIRT 22yo+      Flowsheet Row Most Recent Value   Initial Alcohol Screen: US AUDIT-C     1. How often do you have a drink containing alcohol? 0 Filed at: 01/28/2025 1532   2. How many drinks containing alcohol do you have on a typical day you are drinking?  0 Filed at: 01/28/2025 1532   3a. Male UNDER 65: How often do you have five or more drinks on one occasion? 0 Filed at: 01/28/2025 1532   3b. FEMALE Any Age, or MALE 65+: How often do you have 4 or more drinks on one occassion? 0 Filed at: 01/28/2025 1532   Audit-C Score 0 Filed at: 01/28/2025 1532   EUSEBIO: How many times in the past year have you...    Used an illegal drug or used a prescription medication for non-medical reasons? Never Filed at:  01/28/2025 1532                            History of Present Illness       Chief Complaint   Patient presents with    Abnormal Lab     1/16/25 seen at [facility] for vomiting blood, had esophageal tears with emergent surgeries and intubation. Pt with known pancreatitis and liver issues, had outpatient labs for f/u and had low hgb and high ammonia       Past Medical History:   Diagnosis Date    Arthritis     Asthma     Chronic pain disorder     Chronic pancreatitis (HCC)     Cirrhosis (HCC)     early cirrhosis    GERD (gastroesophageal reflux disease)     History of COVID-19 01/2022    mild s/s    Hyperlipidemia     Hypertension     Liver abscess     Liver disease     Meniscus tear     left knee work injury last assessed 08/24/2016    Pancreatitis     Pneumonia     Rotator cuff tear     Stomach disorder     Chronic gastritus      Past Surgical History:   Procedure Laterality Date    CELIAC PLEXUS BLOCK Bilateral 10/29/2018    Procedure: SPLANCHNIC NERVE BLOCK;  Surgeon: Ramiro Chinchilla MD;  [facility];  Service: Pain Management     CELIAC PLEXUS BLOCK Bilateral 01/10/2019    Procedure: SPLANCHNIC NERVE BLOCK;  Surgeon: Ramiro Chinchilla MD;  [facility];  Service: Pain Management     CHOLECYSTECTOMY      COLONOSCOPY      ESOPHAGOGASTRODUODENOSCOPY N/A 11/16/2017    Procedure: ESOPHAGOGASTRODUODENOSCOPY (EGD);  Surgeon: Ever Bonner MD;  [facility];  Service: Gastroenterology    ESOPHAGOGASTRODUODENOSCOPY N/A 04/19/2018    Procedure: ESOPHAGOGASTRODUODENOSCOPY (EGD);  Surgeon: Orestes Forrest MD;  [facility];  Service: Gastroenterology    ESOPHAGOGASTRODUODENOSCOPY N/A 05/10/2018    Procedure: ESOPHAGOGASTRODUODENOSCOPY (EGD);  Surgeon: Vaibhav Matthew MD;  [facility];  Service: Gastroenterology    ESOPHAGUS SURGERY  01/17/2025    nubia galindo tear repain    HERNIA REPAIR Bilateral 02/03/2023    Procedure: REPAIR HERNIA INGUINAL, LAPAROSCOPIC,;  Surgeon: Juanjo Travis DO;   [facility];  Service: General    IR TEMPORARY DIALYSIS CATHETER PLACEMENT  02/28/2019    KNEE ARTHROSCOPY Bilateral     KNEE ARTHROSCOPY Right 2009    cibishino last assessed 08/24/2016    KNEE ARTHROSCOPY Right 07/01/2019    LIVER SURGERY      NERVE BLOCK Bilateral 05/29/2018    Procedure: SPLANCHNIC NERVE BLOCK;  Surgeon: Ramiro Chinchilla MD;  [facility];  Service: Pain Management     PANCREAS SURGERY      stents    PANCREATIC CYST EXCISION      AK INJECTION ANES LMBR/THRC PARAVERTBRL SYMPATHETIC Bilateral 12/28/2017    Procedure: SPLANCHNIC NERVE BLOCK;  Surgeon: Ramiro Chinchilla MD;  [facility];  Service: Pain Management     AK INJX ANES CELIAC PLEXUS W/WO RADIOLOGIC MONITRNG Bilateral 05/09/2017    Procedure: CELIAC PLEXUS BLOCK ;  Surgeon: Ramiro Chinchilla MD;  [facility];  Service: Pain Management     AK INJX ANES CELIAC PLEXUS W/WO RADIOLOGIC MONITRNG Bilateral 06/01/2017    Procedure: SPLANCHNIC NERVE BLOCK at T12;  Surgeon: Ramiro Chinchilla MD;  [facility];  Service: Pain Management     AK INJX ANES CELIAC PLEXUS W/WO RADIOLOGIC MONITRNG Bilateral 08/08/2017    Procedure: BILATERAL SPLANCHNIC NERVE BLOCK T12;  Surgeon: Ramiro Chinchilla MD;  [facility];  Service: Pain Management     AK INJX ANES CELIAC PLEXUS W/WO RADIOLOGIC MONITRNG Bilateral 04/18/2019    Procedure: BLOCK / INJECTION CELIAC PLEXUS;  Surgeon: Ramiro Chinchilla MD;  [facility];  Service: Pain Management     AK INJX ANES CELIAC PLEXUS W/WO RADIOLOGIC MONITRNG Bilateral 08/20/2019    Procedure: SPLANCHNIC NERVE BLOCK;  Surgeon: Ramiro Chinchilla MD;  [facility];  Service: Pain Management     AK INJX ANES CELIAC PLEXUS W/WO RADIOLOGIC MONITRNG Bilateral 10/17/2019    Procedure: SPLANCHNIC NERVE BLOCK;  Surgeon: Ramiro Chinchilla MD;  [facility];  Service: Pain Management     AK LAPAROSCOPY SURG CHOLECYSTECTOMY N/A 02/14/2017    Procedure: LAPAROSCOPIC CHOLECYSTECTOMY, IOC,  POSSIBLE OPEN.;  Surgeon: Suresh Lang MD;  [facility];  Service: General    AK SURGICAL ARTHROSCOPY SHOULDER W/ROTATOR CUFF RPR Left 06/09/2023    Procedure: Left Shoulder Arthroscopic Rotator Cuff Repair, Open Subpectoral Biceps Tenodesis, Acromioplasty, Extensive Debridement;  Surgeon: Toro Healy DO;  [facility];  Service: Orthopedics    ROTATOR CUFF REPAIR Right     SHOULDER ARTHROSCOPY      SHOULDER ARTHROSCOPY Right     SHOULDER SURGERY      TENDON REPAIR  2015    Right shoulder      Family History   Problem Relation Age of Onset    Cirrhosis Mother     Heart disease Other         cardiac disorder    Cancer Other       Social History     Tobacco Use    Smoking status: Former     Current packs/day: 0.00     Average packs/day: 1 pack/day for 20.0 years (20.0 ttl pk-yrs)     Types: Cigarettes     Start date: 3/15/2001     Quit date: 3/15/2021     Years since quitting: 3.8    Smokeless tobacco: Never   Vaping Use    Vaping status: Some Days    Substances: Nicotine   Substance Use Topics    Alcohol use: Yes     Alcohol/week: 1.0 standard drink of alcohol     Types: 1 Cans of beer per week     Comment: on weekends    Drug use: No      E-Cigarette/Vaping    E-Cigarette Use Current Some Day User       E-Cigarette/Vaping Substances    Nicotine Yes     THC No     CBD No     Flavoring No     Other No     Unknown No       I have reviewed and agree with the history as documented.     HPI    Review of Systems        Objective       ED Triage Vitals [01/28/25 1529]   Temperature Pulse Blood Pressure Respirations SpO2 Patient Position - Orthostatic VS   98 °F (36.7 °C) 82 143/62 18 99 % Sitting      Temp Source Heart Rate Source BP Location FiO2 (%) Pain Score    Temporal Monitor Left arm -- --      Vitals      Date and Time Temp Pulse SpO2 Resp BP Pain Score FACES Pain Rating User   01/28/25 1529 98 °F (36.7 °C) 82 99 % 18 143/62 -- -- RO            Physical Exam  Vitals and nursing note  reviewed.   Constitutional:       General: He is not in acute distress.     Appearance: He is well-developed.   HENT:      Head: Normocephalic and atraumatic.      Mouth/Throat:      Mouth: Mucous membranes are moist.   Eyes:      General: No scleral icterus.     Conjunctiva/sclera: Conjunctivae normal.      Pupils: Pupils are equal, round, and reactive to light.   Neck:      Trachea: No tracheal deviation.   Cardiovascular:      Rate and Rhythm: Normal rate and regular rhythm.      Heart sounds: Normal heart sounds.   Pulmonary:      Effort: Pulmonary effort is normal. No respiratory distress.      Breath sounds: Normal breath sounds.   Abdominal:      General: There is no distension.      Palpations: Abdomen is soft. There is no fluid wave.      Tenderness: There is no abdominal tenderness.   Musculoskeletal:      Cervical back: Normal range of motion.   Skin:     General: Skin is warm and dry.      Coloration: Skin is not jaundiced.   Neurological:      Mental Status: He is alert and oriented to person, place, and time.      GCS: GCS eye subscore is 4. GCS verbal subscore is 5. GCS motor subscore is 6.   Psychiatric:         Mood and Affect: Mood and affect normal.         Behavior: Behavior normal.         Results Reviewed       Procedure Component Value Units Date/Time    Hepatic function panel [[id number]]  (Abnormal) Collected: 01/28/25 1625    Lab Status: Final result Specimen: Blood from Arm, Right Updated: 01/28/25 1722     Total Bilirubin 1.62 mg/dL      Bilirubin, Direct 0.59 mg/dL      Alkaline Phosphatase 97 U/L      AST 50 U/L      ALT 18 U/L      Total Protein 6.6 g/dL      Albumin 3.3 g/dL     Basic metabolic panel [[id number]]  (Abnormal) Collected: 01/28/25 1625    Lab Status: Final result Specimen: Blood from Arm, Right Updated: 01/28/25 1657     Sodium 141 mmol/L      Potassium 3.7 mmol/L      Chloride 109 mmol/L      CO2 26 mmol/L      ANION GAP 6 mmol/L      BUN 7 mg/dL      Creatinine 0.55  mg/dL      Glucose 129 mg/dL      Calcium 8.3 mg/dL      eGFR 120 ml/min/1.73sq m     Narrative:      National Kidney Disease Foundation guidelines for Chronic Kidney Disease (CKD):     Stage 1 with normal or high GFR (GFR > 90 mL/min/1.73 square meters)    Stage 2 Mild CKD (GFR = 60-89 mL/min/1.73 square meters)    Stage 3A Moderate CKD (GFR = 45-59 mL/min/1.73 square meters)    Stage 3B Moderate CKD (GFR = 30-44 mL/min/1.73 square meters)    Stage 4 Severe CKD (GFR = 15-29 mL/min/1.73 square meters)    Stage 5 End Stage CKD (GFR <15 mL/min/1.73 square meters)  Note: GFR calculation is accurate only with a steady state creatinine    Ammonia [[id number]]  (Normal) Collected: 01/28/25 1625    Lab Status: Final result Specimen: Blood from Arm, Right Updated: 01/28/25 1656     Ammonia 42 umol/L     CBC and differential [[id number]]  (Abnormal) Collected: 01/28/25 1625    Lab Status: Final result Specimen: Blood from Arm, Right Updated: 01/28/25 1633     WBC 5.31 Thousand/uL      RBC 3.11 Million/uL      Hemoglobin 9.4 g/dL      Hematocrit 29.9 %      MCV 96 fL      MCH 30.2 pg      MCHC 31.4 g/dL      RDW 17.7 %      MPV 11.0 fL      Platelets 150 Thousands/uL      nRBC 0 /100 WBCs      Segmented % 57 %      Immature Grans % 0 %      Lymphocytes % 25 %      Monocytes % 15 %      Eosinophils Relative 2 %      Basophils Relative 1 %      Absolute Neutrophils 3.02 Thousands/µL      Absolute Immature Grans 0.02 Thousand/uL      Absolute Lymphocytes 1.34 Thousands/µL      Absolute Monocytes 0.77 Thousand/µL      Eosinophils Absolute 0.09 Thousand/µL      Basophils Absolute 0.07 Thousands/µL             No orders to display       Procedures    ED Medication and Procedure Management   Prior to Admission Medications   Prescriptions Last Dose Informant Patient Reported? Taking?   Choline Fenofibrate (Fenofibric Acid) 135 MG CPDR  Self No No   Sig: TAKE ONE CAPSULE BY MOUTH EVERY DAY   Empagliflozin 10 MG TABS  Self Yes No   Sig:  Take 10 mg by mouth every morning   Insulin Pen Needle (Pen Needles) 32G X 5 MM MISC  Self Yes No   Sig: use once daily as directed   Multiple Vitamins-Minerals (MULTIVITAMIN ADULTS PO)  Self Yes No   Sig: Take by mouth daily after breakfast   NON FORMULARY  Self Yes No   Sig: Pantessin 1 capsule daily   Pancrelipase, Lip-Prot-Amyl, (Creon) 25672-037514 units CPEP  Self No No   Sig: TAKE 1 CAPSULE (36,000 UNITS TOTAL) BY MOUTH 3 (THREE) TIMES A DAY BEFORE MEALS   TURMERIC PO  Self Yes No   Sig: Take by mouth in the morning   Thiamine HCl (vitamin B-1) 250 MG tablet   Yes No   Sig: Take 250 mg by mouth daily   albuterol (PROVENTIL HFA,VENTOLIN HFA) 90 mcg/act inhaler  Self No No   Sig: INHALE TWO PUFFS BY MOUTH EVERY 6 HOURS AS NEEDED FOR WHEEZING   amLODIPine (NORVASC) 5 mg tablet  Self No No   Sig: Take 1 tablet (5 mg total) by mouth daily   busPIRone (BUSPAR) 5 mg tablet  Self No No   Sig: Take 1 tablet (5 mg total) by mouth 2 (two) times a day   carvedilol (COREG) 3.125 mg tablet   Yes No   Sig: Take 3.125 mg by mouth   esomeprazole (NexIUM) 40 MG capsule  Self No No   Sig: Take 1 capsule (40 mg total) by mouth 2 (two) times a day before meals   famotidine (PEPCID) 40 MG tablet  Self Yes No   Sig: Take 40 mg by mouth daily at bedtime as needed    fluticasone (FLONASE) 50 mcg/act nasal spray  Self No No   Si spray into each nostril daily   insulin degludec (Tresiba FlexTouch) 100 units/mL injection pen  Self Yes No   Si units subcut in hs   lactulose (CHRONULAC) 10 g/15 mL solution   Yes No   lisinopril (ZESTRIL) 20 mg tablet  Self No No   Sig: TAKE ONE TABLET BY MOUTH EVERY DAY   mirtazapine (REMERON) 7.5 MG tablet  Self No No   Sig: Take 1 tablet (7.5 mg total) by mouth daily at bedtime   omega-3-acid ethyl esters (LOVAZA) 1 g capsule  Self No No   Sig: TAKE TWO CAPSULES BY MOUTH TWICE DAILY   Patient not taking: Reported on 2024   ondansetron (ZOFRAN-ODT) 4 mg disintegrating tablet  Self No No    Sig: Take 1 tablet (4 mg total) by mouth every 8 (eight) hours as needed for nausea or vomiting   pancrelipase, Lip-Prot-Amyl, (Creon) 12,000 units capsule  Self No No   Sig: Take 12,000 units of lipase by mouth 3 (three) times a day as needed for snacks   propranolol (INDERAL) 20 mg tablet  Self No No   Sig: TAKE 1 TABLET BY MOUTH TWICE DAILY   rosuvastatin (CRESTOR) 40 MG tablet  Self No No   Sig: TAKE ONE TABLET BY MOUTH EVERY DAY      Facility-Administered Medications: None     Patient's Medications   Discharge Prescriptions    No medications on file     No discharge procedures on file.  ED SEPSIS DOCUMENTATION   Time reflects when diagnosis was documented in both MDM as applicable and the Disposition within this note       Time User Action Codes Description Comment    1/28/2025  5:28 PM Jasmyne Maloney [K74.60] Cirrhosis (HCC)     1/28/2025  5:28 PM Jasmyne Maloney [E80.6] Hyperbilirubinemia     1/28/2025  5:28 PM Jasmyne Maloney [D64.9] Anemia                  Jasmyen Maloney MD  01/28/25 8248

## 2025-01-28 NOTE — DISCHARGE INSTRUCTIONS
Your hemoglobin today is essentially unchanged from yesterday, but is improved from when you are at [facility].  Ammonia has improved yesterday.  Continue to take your medications as prescribed.  If you start to get confused, or have decreased bowel movements, take an extra dose of the lactulose.  Follow-up with your primary care doctor as needed, and the GI doctor on Thursday as scheduled for further evaluation management.  Return if you develop worsening confusion that does not respond to the lactulose, recurrence of vomiting blood, or persistently dark stools, or for any other concerns.

## 2025-01-28 NOTE — TELEPHONE ENCOUNTER
All his labs aren't back yet but there are some abnormalities showing please advise if he can wait for all labs to come back

## 2025-01-29 ENCOUNTER — TELEPHONE (OUTPATIENT)
Age: 50
End: 2025-01-29

## 2025-01-29 NOTE — TELEPHONE ENCOUNTER
Geraldine, wife called, stated patient's insurance had been reinstated on 12/282024 but was not active for 1/9/2025 office visit. Patient had to pay $111.00 for the visit.    Insurance will give a reimbursement, however they are requesting a paid itemized bill with procedure codes.    Patient needs this for repayment.     Please advise and notify. Thank you.

## 2025-01-30 ENCOUNTER — OFFICE VISIT (OUTPATIENT)
Dept: GASTROENTEROLOGY | Facility: CLINIC | Age: 50
End: 2025-01-30
Payer: COMMERCIAL

## 2025-01-30 VITALS
BODY MASS INDEX: 26.84 KG/M2 | WEIGHT: 171 LBS | HEIGHT: 67 IN | SYSTOLIC BLOOD PRESSURE: 118 MMHG | DIASTOLIC BLOOD PRESSURE: 62 MMHG | OXYGEN SATURATION: 98 % | HEART RATE: 79 BPM | TEMPERATURE: 99.3 F

## 2025-01-30 DIAGNOSIS — I85.11 SECONDARY ESOPHAGEAL VARICES WITH BLEEDING (HCC): Primary | ICD-10-CM

## 2025-01-30 DIAGNOSIS — K76.82 HEPATIC ENCEPHALOPATHY (HCC): ICD-10-CM

## 2025-01-30 DIAGNOSIS — K74.60 CIRRHOSIS OF LIVER WITHOUT ASCITES, UNSPECIFIED HEPATIC CIRRHOSIS TYPE (HCC): ICD-10-CM

## 2025-01-30 DIAGNOSIS — K70.11 ALCOHOLIC HEPATITIS WITH ASCITES: ICD-10-CM

## 2025-01-30 PROCEDURE — 99214 OFFICE O/P EST MOD 30 MIN: CPT | Performed by: PHYSICIAN ASSISTANT

## 2025-01-30 NOTE — ASSESSMENT & PLAN NOTE
- MASH +/- alcohol induced  - MELD Na 1/16 was 14, will repeat week of 2/10  - Grade 0 HE currently but now on lactulose as above so we will continue this  - Trace ascites on abdominal imaging, no LE edema today so no indication for diuretics at this point  - EGD as above; planning repeat EGD 2/10 to band again  - HCC screen will be done with US with dopplers, AFP normal  - Discussed potential for transplant in the future but   Orders:    CBC and Platelet; Future    Comprehensive metabolic panel; Future    Protime-INR; Future      Follow up in 6 weeks.

## 2025-01-30 NOTE — PROGRESS NOTES
Name: Ashwin Wright      : 1975      MRN: [id number]  Encounter Provider: Dinora Mensah PA-C  Encounter Date: 2025   Encounter department: [facility]STROENTEROLOG[facility] [place]  :  Assessment & Plan  Secondary esophageal varices with bleeding (HCC)  - Developed acute variceal bleeding in the setting of acute alcoholic hepatitis and questionable PV thrombus on US there  - EGD  at [facility] with large EV with stigmata s/p banding x 2, no gastric varices, severe PHG noted  - Continue carvedilol 3.125 g BID  - Repeat EGD within 3-4 weeks of last EGD, we will schedule this today    Orders:    EGD Banding; Future    Alcoholic hepatitis with ascites  -  He has known cirrhosis that has progressed from severe steatosis over years, thought to be 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 given his hypertriglyceride disorder, but presented to [facility] on  with acute esophageal variceal bleeding with alcohol intoxication and had HE as well so there may be an alcohol component to his cirrhosis as well  - Labs consistent with an alcoholic hepatitis there and have improved, last labs done on   - There was question of a possible PV thrombus on an US done there so we will repeat an US with dopplers STAT to clarify this as this would determine need for anticoagulation to reduce worsening portal hypertension  - Repeat CBC, CMP, INR week of 2/10  - Importance of alcohol cessation/abstinence discussed, his wife is aware, no alcohol since prior to the hospital stay; they are getting support from friends and their   Orders:    EGD Banding; Future    CBC and Platelet; Future    Comprehensive metabolic panel; Future    Protime-INR; Future    Hepatic encephalopathy (HCC)  - New onset with his variceal bleeding, on lactulose 10g BID and having 2-3 BMs daily so we will continue this dose       Cirrhosis of liver without ascites, unspecified hepatic cirrhosis type (HCC)  - MASH +/- alcohol induced  - MELD Na  was  14, will repeat week of 2/10  - Grade 0 HE currently but now on lactulose as above so we will continue this  - Trace ascites on abdominal imaging, no LE edema today so no indication for diuretics at this point  - EGD as above; planning repeat EGD 2/10 to band again  - HCC screen will be done with US with dopplers, AFP normal  - Discussed potential for transplant in the future but   Orders:    CBC and Platelet; Future    Comprehensive metabolic panel; Future    Protime-INR; Future      Follow up in 6 weeks.      History of Present Illness   HPI  Ashwin Wright is a 49 y.o. male who presents for follow up after he was at [facility] from 1/16-1/23 for acute variceal bleeding.  He apparently was drinking heavily for at least several weeks, his wife is concerned this may have been going on longer and she is here today, and presented to [facility] on the 16th with an ethanol level of 249 and vomiting of laura blood in copious amounts with hypotension.  He underwent EGD on the 17th with large esophageal varices with stigmata, banding x 2 with no gastric varices and severe portal hypertensive gastropathy noted. He was intubated and sedated for 48 hours for withdrawal and bleeding reasons. He was encephalopathic during the admission and at home some and has been on lactulose with resolution of that. He is having 2-3 BMs daily. He is very tired overall.  There has been no alcohol use since prior to that admission.  He tells me it was only a couple weeks he was drinking heavily and can't quantify how much he was drinking.  He has not seen any black or bloody bowel movements and no recurrent vomiting.  History obtained from: patient and patient's Significant Other    Review of Systems  Medical History Reviewed by provider this encounter:  Tobacco  Allergies  Meds  Problems  Med Hx  Surg Hx  Fam Hx     .  Current Outpatient Medications on File Prior to Visit   Medication Sig Dispense Refill    albuterol (PROVENTIL  HFA,VENTOLIN HFA) 90 mcg/act inhaler INHALE TWO PUFFS BY MOUTH EVERY 6 HOURS AS NEEDED FOR WHEEZING 54 g 0    amLODIPine (NORVASC) 5 mg tablet Take 1 tablet (5 mg total) by mouth daily 90 tablet 1    carvedilol (COREG) 3.125 mg tablet Take 3.125 mg by mouth (Patient taking differently: Take 3.125 mg by mouth 2 (two) times a day)      Choline Fenofibrate (Fenofibric Acid) 135 MG CPDR TAKE ONE CAPSULE BY MOUTH EVERY DAY 90 capsule 1    esomeprazole (NexIUM) 40 MG capsule Take 1 capsule (40 mg total) by mouth 2 (two) times a day before meals (Patient taking differently: Take 40 mg by mouth if needed) 180 capsule 1    insulin degludec (Tresiba FlexTouch) 100 units/mL injection pen 20 units subcut in hs      Insulin Pen Needle (Pen Needles) 32G X 5 MM MISC use once daily as directed      lactulose (CHRONULAC) 10 g/15 mL solution       Multiple Vitamins-Minerals (MULTIVITAMIN ADULTS PO) Take by mouth daily after breakfast      ondansetron (ZOFRAN-ODT) 4 mg disintegrating tablet Take 1 tablet (4 mg total) by mouth every 8 (eight) hours as needed for nausea or vomiting 30 tablet 0    pancrelipase, Lip-Prot-Amyl, (Creon) 12,000 units capsule Take 12,000 units of lipase by mouth 3 (three) times a day as needed for snacks 270 capsule 1    Pancrelipase, Lip-Prot-Amyl, (Creon) 11985-054062 units CPEP TAKE 1 CAPSULE (36,000 UNITS TOTAL) BY MOUTH 3 (THREE) TIMES A DAY BEFORE MEALS 270 capsule 1    rosuvastatin (CRESTOR) 40 MG tablet TAKE ONE TABLET BY MOUTH EVERY DAY 90 tablet 1    Thiamine HCl (vitamin B-1) 250 MG tablet Take 250 mg by mouth daily      busPIRone (BUSPAR) 5 mg tablet Take 1 tablet (5 mg total) by mouth 2 (two) times a day (Patient not taking: Reported on 1/30/2025) 60 tablet 5    Empagliflozin 10 MG TABS Take 10 mg by mouth every morning (Patient not taking: Reported on 1/30/2025)      famotidine (PEPCID) 40 MG tablet Take 40 mg by mouth daily at bedtime as needed  (Patient not taking: Reported on 1/30/2025)       "fluticasone (FLONASE) 50 mcg/act nasal spray 1 spray into each nostril daily (Patient not taking: Reported on 1/30/2025) 9 mL 1    lisinopril (ZESTRIL) 20 mg tablet TAKE ONE TABLET BY MOUTH EVERY DAY (Patient not taking: Reported on 1/30/2025) 90 tablet 1    mirtazapine (REMERON) 7.5 MG tablet Take 1 tablet (7.5 mg total) by mouth daily at bedtime (Patient not taking: Reported on 1/30/2025) 30 tablet 1    NON FORMULARY Pantessin 1 capsule daily (Patient not taking: Reported on 1/30/2025)      omega-3-acid ethyl esters (LOVAZA) 1 g capsule TAKE TWO CAPSULES BY MOUTH TWICE DAILY (Patient not taking: Reported on 8/19/2024) 360 capsule 0    propranolol (INDERAL) 20 mg tablet TAKE 1 TABLET BY MOUTH TWICE DAILY (Patient not taking: Reported on 1/30/2025) 180 tablet 0    TURMERIC PO Take by mouth in the morning (Patient not taking: Reported on 1/30/2025)       No current facility-administered medications on file prior to visit.         Objective   /62 (BP Location: Left arm, Patient Position: Sitting, Cuff Size: Large)   Pulse 79   Temp 99.3 °F (37.4 °C) (Tympanic)   Ht 5' 7\" (1.702 m)   Wt 77.6 kg (171 lb)   SpO2 98%   BMI 26.78 kg/m²      Physical Exam  General- Appears chronically ill, mildly pale, some muscle wasting present  CV- RRR, no murmur; HR72  Pulm- CTA bilaterally, no distress  Abdomen- Mild distention but soft, BS active, NTTP  LE- No edema present  "

## 2025-01-30 NOTE — PATIENT INSTRUCTIONS
Scheduled date of EGD(as of today):2/10/25  Physician performing EGD:Aubrey  Location of EGD:[place]  Instructions reviewed with patient by:Joshua park  Clearances:  none

## 2025-01-30 NOTE — H&P (VIEW-ONLY)
Name: Ashwin Wright      : 1975      MRN: [id number]  Encounter Provider: Dinora Mensah PA-C  Encounter Date: 2025   Encounter department: [facility]STROENTEROLOG[facility] [place]  :  Assessment & Plan  Secondary esophageal varices with bleeding (HCC)  - Developed acute variceal bleeding in the setting of acute alcoholic hepatitis and questionable PV thrombus on US there  - EGD  at [facility] with large EV with stigmata s/p banding x 2, no gastric varices, severe PHG noted  - Continue carvedilol 3.125 g BID  - Repeat EGD within 3-4 weeks of last EGD, we will schedule this today    Orders:    EGD Banding; Future    Alcoholic hepatitis with ascites  -  He has known cirrhosis that has progressed from severe steatosis over years, thought to be 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 given his hypertriglyceride disorder, but presented to [facility] on  with acute esophageal variceal bleeding with alcohol intoxication and had HE as well so there may be an alcohol component to his cirrhosis as well  - Labs consistent with an alcoholic hepatitis there and have improved, last labs done on   - There was question of a possible PV thrombus on an US done there so we will repeat an US with dopplers STAT to clarify this as this would determine need for anticoagulation to reduce worsening portal hypertension  - Repeat CBC, CMP, INR week of 2/10  - Importance of alcohol cessation/abstinence discussed, his wife is aware, no alcohol since prior to the hospital stay; they are getting support from friends and their   Orders:    EGD Banding; Future    CBC and Platelet; Future    Comprehensive metabolic panel; Future    Protime-INR; Future    Hepatic encephalopathy (HCC)  - New onset with his variceal bleeding, on lactulose 10g BID and having 2-3 BMs daily so we will continue this dose       Cirrhosis of liver without ascites, unspecified hepatic cirrhosis type (HCC)  - MASH +/- alcohol induced  - MELD Na  was  14, will repeat week of 2/10  - Grade 0 HE currently but now on lactulose as above so we will continue this  - Trace ascites on abdominal imaging, no LE edema today so no indication for diuretics at this point  - EGD as above; planning repeat EGD 2/10 to band again  - HCC screen will be done with US with dopplers, AFP normal  - Discussed potential for transplant in the future but   Orders:    CBC and Platelet; Future    Comprehensive metabolic panel; Future    Protime-INR; Future      Follow up in 6 weeks.      History of Present Illness   HPI  Ashwin Wright is a 49 y.o. male who presents for follow up after he was at [facility] from 1/16-1/23 for acute variceal bleeding.  He apparently was drinking heavily for at least several weeks, his wife is concerned this may have been going on longer and she is here today, and presented to [facility] on the 16th with an ethanol level of 249 and vomiting of laura blood in copious amounts with hypotension.  He underwent EGD on the 17th with large esophageal varices with stigmata, banding x 2 with no gastric varices and severe portal hypertensive gastropathy noted. He was intubated and sedated for 48 hours for withdrawal and bleeding reasons. He was encephalopathic during the admission and at home some and has been on lactulose with resolution of that. He is having 2-3 BMs daily. He is very tired overall.  There has been no alcohol use since prior to that admission.  He tells me it was only a couple weeks he was drinking heavily and can't quantify how much he was drinking.  He has not seen any black or bloody bowel movements and no recurrent vomiting.  History obtained from: patient and patient's Significant Other    Review of Systems  Medical History Reviewed by provider this encounter:  Tobacco  Allergies  Meds  Problems  Med Hx  Surg Hx  Fam Hx     .  Current Outpatient Medications on File Prior to Visit   Medication Sig Dispense Refill    albuterol (PROVENTIL  HFA,VENTOLIN HFA) 90 mcg/act inhaler INHALE TWO PUFFS BY MOUTH EVERY 6 HOURS AS NEEDED FOR WHEEZING 54 g 0    amLODIPine (NORVASC) 5 mg tablet Take 1 tablet (5 mg total) by mouth daily 90 tablet 1    carvedilol (COREG) 3.125 mg tablet Take 3.125 mg by mouth (Patient taking differently: Take 3.125 mg by mouth 2 (two) times a day)      Choline Fenofibrate (Fenofibric Acid) 135 MG CPDR TAKE ONE CAPSULE BY MOUTH EVERY DAY 90 capsule 1    esomeprazole (NexIUM) 40 MG capsule Take 1 capsule (40 mg total) by mouth 2 (two) times a day before meals (Patient taking differently: Take 40 mg by mouth if needed) 180 capsule 1    insulin degludec (Tresiba FlexTouch) 100 units/mL injection pen 20 units subcut in hs      Insulin Pen Needle (Pen Needles) 32G X 5 MM MISC use once daily as directed      lactulose (CHRONULAC) 10 g/15 mL solution       Multiple Vitamins-Minerals (MULTIVITAMIN ADULTS PO) Take by mouth daily after breakfast      ondansetron (ZOFRAN-ODT) 4 mg disintegrating tablet Take 1 tablet (4 mg total) by mouth every 8 (eight) hours as needed for nausea or vomiting 30 tablet 0    pancrelipase, Lip-Prot-Amyl, (Creon) 12,000 units capsule Take 12,000 units of lipase by mouth 3 (three) times a day as needed for snacks 270 capsule 1    Pancrelipase, Lip-Prot-Amyl, (Creon) 53021-573094 units CPEP TAKE 1 CAPSULE (36,000 UNITS TOTAL) BY MOUTH 3 (THREE) TIMES A DAY BEFORE MEALS 270 capsule 1    rosuvastatin (CRESTOR) 40 MG tablet TAKE ONE TABLET BY MOUTH EVERY DAY 90 tablet 1    Thiamine HCl (vitamin B-1) 250 MG tablet Take 250 mg by mouth daily      busPIRone (BUSPAR) 5 mg tablet Take 1 tablet (5 mg total) by mouth 2 (two) times a day (Patient not taking: Reported on 1/30/2025) 60 tablet 5    Empagliflozin 10 MG TABS Take 10 mg by mouth every morning (Patient not taking: Reported on 1/30/2025)      famotidine (PEPCID) 40 MG tablet Take 40 mg by mouth daily at bedtime as needed  (Patient not taking: Reported on 1/30/2025)       "fluticasone (FLONASE) 50 mcg/act nasal spray 1 spray into each nostril daily (Patient not taking: Reported on 1/30/2025) 9 mL 1    lisinopril (ZESTRIL) 20 mg tablet TAKE ONE TABLET BY MOUTH EVERY DAY (Patient not taking: Reported on 1/30/2025) 90 tablet 1    mirtazapine (REMERON) 7.5 MG tablet Take 1 tablet (7.5 mg total) by mouth daily at bedtime (Patient not taking: Reported on 1/30/2025) 30 tablet 1    NON FORMULARY Pantessin 1 capsule daily (Patient not taking: Reported on 1/30/2025)      omega-3-acid ethyl esters (LOVAZA) 1 g capsule TAKE TWO CAPSULES BY MOUTH TWICE DAILY (Patient not taking: Reported on 8/19/2024) 360 capsule 0    propranolol (INDERAL) 20 mg tablet TAKE 1 TABLET BY MOUTH TWICE DAILY (Patient not taking: Reported on 1/30/2025) 180 tablet 0    TURMERIC PO Take by mouth in the morning (Patient not taking: Reported on 1/30/2025)       No current facility-administered medications on file prior to visit.         Objective   /62 (BP Location: Left arm, Patient Position: Sitting, Cuff Size: Large)   Pulse 79   Temp 99.3 °F (37.4 °C) (Tympanic)   Ht 5' 7\" (1.702 m)   Wt 77.6 kg (171 lb)   SpO2 98%   BMI 26.78 kg/m²      Physical Exam  General- Appears chronically ill, mildly pale, some muscle wasting present  CV- RRR, no murmur; HR72  Pulm- CTA bilaterally, no distress  Abdomen- Mild distention but soft, BS active, NTTP  LE- No edema present  "

## 2025-01-31 ENCOUNTER — RESULTS FOLLOW-UP (OUTPATIENT)
Dept: GASTROENTEROLOGY | Facility: CLINIC | Age: 50
End: 2025-01-31

## 2025-01-31 ENCOUNTER — HOSPITAL ENCOUNTER (OUTPATIENT)
Dept: ULTRASOUND IMAGING | Facility: HOSPITAL | Age: 50
End: 2025-01-31
Payer: COMMERCIAL

## 2025-01-31 DIAGNOSIS — M79.89 LEG SWELLING: ICD-10-CM

## 2025-01-31 DIAGNOSIS — R79.89 ELEVATED LFTS: ICD-10-CM

## 2025-01-31 LAB
AMMONIA PLAS-SCNC: 84 UMOL/L
BASOPHILS # BLD AUTO: 60 CELLS/UL (ref 0–200)
BASOPHILS NFR BLD AUTO: 1.5 %
EOSINOPHIL # BLD AUTO: 72 CELLS/UL (ref 15–500)
EOSINOPHIL NFR BLD AUTO: 1.8 %
ERYTHROCYTE [DISTWIDTH] IN BLOOD BY AUTOMATED COUNT: 15.1 % (ref 11–15)
GAD65 AB SER IA-ACNC: <5 IU/ML
HCT VFR BLD AUTO: 30.5 % (ref 38.5–50)
HGB BLD-MCNC: 9.8 G/DL (ref 13.2–17.1)
LYMPHOCYTES # BLD AUTO: 972 CELLS/UL (ref 850–3900)
LYMPHOCYTES NFR BLD AUTO: 24.3 %
MCH RBC QN AUTO: 30.2 PG (ref 27–33)
MCHC RBC AUTO-ENTMCNC: 32.1 G/DL (ref 32–36)
MCV RBC AUTO: 93.8 FL (ref 80–100)
MONOCYTES # BLD AUTO: 612 CELLS/UL (ref 200–950)
MONOCYTES NFR BLD AUTO: 15.3 %
NEUTROPHILS # BLD AUTO: 2284 CELLS/UL (ref 1500–7800)
NEUTROPHILS NFR BLD AUTO: 57.1 %
PLATELET # BLD AUTO: 135 THOUSAND/UL (ref 140–400)
PLPETH BLD-MCNC: 57 NG/ML
PMV BLD REES-ECKER: 12.8 FL (ref 7.5–12.5)
POPETH BLD-MCNC: 107 NG/ML
RBC # BLD AUTO: 3.25 MILLION/UL (ref 4.2–5.8)
WBC # BLD AUTO: 4 THOUSAND/UL (ref 3.8–10.8)

## 2025-01-31 PROCEDURE — 76705 ECHO EXAM OF ABDOMEN: CPT

## 2025-01-31 NOTE — TELEPHONE ENCOUNTER
----- Message from Dinora Mensah PA-C sent at 1/31/2025  1:37 PM EST -----  Please let him know that his ultrasound does not show a blood clot around his liver and does not show any masses in his liver which is great news.

## 2025-02-01 DIAGNOSIS — G47.00 INSOMNIA, UNSPECIFIED TYPE: ICD-10-CM

## 2025-02-03 ENCOUNTER — TELEPHONE (OUTPATIENT)
Dept: GASTROENTEROLOGY | Facility: CLINIC | Age: 50
End: 2025-02-03

## 2025-02-03 ENCOUNTER — TELEPHONE (OUTPATIENT)
Age: 50
End: 2025-02-03

## 2025-02-03 RX ORDER — MIRTAZAPINE 7.5 MG/1
7.5 TABLET, FILM COATED ORAL
Qty: 30 TABLET | Refills: 0 | Status: SHIPPED | OUTPATIENT
Start: 2025-02-03

## 2025-02-03 NOTE — TELEPHONE ENCOUNTER
Called and spoke to patients wife. She is asking if patient needs more labs done to check levels and is asking if out office can fill out long term disability paper work. Let her know Dinora is not in office today but will be in on 2/4/2025 ( Tuesday ) and will give her message them . She voiced understanding and had no further questions or cocnerns

## 2025-02-03 NOTE — TELEPHONE ENCOUNTER
Patients wife Geraldine calling with two questions:    Does he need labs done again? He is very tired    Is the long term disability form faxed yet?    Please call her back at [phone]

## 2025-02-03 NOTE — TELEPHONE ENCOUNTER
Geraldine called, requesting to fax long term disability form to [employer].     Fax: [phone]    Please call her back at [phone] when faxed.     Please advise, thank you.

## 2025-02-03 NOTE — TELEPHONE ENCOUNTER
Called & spoke to patients wife. Gave her message as per Edilia. She voiced understanding and  had no further questions or concerns

## 2025-02-03 NOTE — TELEPHONE ENCOUNTER
Hello I am hoping you would be able to help patient with filling these forms out for him, Yrn vaughan doesn't feel comfortable with filling forms out as there are some questions that he is unable to answer as patient is being treated through GI for this Diagnosis

## 2025-02-03 NOTE — TELEPHONE ENCOUNTER
Patient Lactulose 2 tablespoons twice a day.   Was having 2-3 BM's, now only having 1 yesterday and so far 1 today's.  Has been sleeping a lot seems a little perkier today but getting very tired quickly.   She is concerned that he's only having one BM and would like to know if he should have labs done and would like to know if the lactulose should be increased.  Please review and advise.

## 2025-02-04 DIAGNOSIS — K74.60 CIRRHOSIS OF LIVER WITHOUT ASCITES, UNSPECIFIED HEPATIC CIRRHOSIS TYPE (HCC): ICD-10-CM

## 2025-02-04 DIAGNOSIS — K76.82 HEPATIC ENCEPHALOPATHY (HCC): ICD-10-CM

## 2025-02-04 DIAGNOSIS — K70.11 ALCOHOLIC HEPATITIS WITH ASCITES: ICD-10-CM

## 2025-02-04 DIAGNOSIS — I85.11 SECONDARY ESOPHAGEAL VARICES WITH BLEEDING (HCC): Primary | ICD-10-CM

## 2025-02-04 NOTE — TELEPHONE ENCOUNTER
Routing to PA      Patients wife is asking if patient should have more labs drawn to check his levels and also asking if long term disability papers can be filled out.      Please advise. Thank you !

## 2025-02-04 NOTE — TELEPHONE ENCOUNTER
Pt's wife calling to request paperwork scanned in by family doctor be filled out and faxed back over to fax# indicated on paperwork. Please help to fill this out and call wife back once faxed over. Please note pt is relying on this for income.

## 2025-02-04 NOTE — TELEPHONE ENCOUNTER
Reason for call:   [x] Refill   [] Prior Auth  [] Other:     Office:   [] PCP/Provider -   [x] Specialty/Provider -     Medication:     carvedilol (COREG) 3.125 mg tablet       Dose/Frequency: Take 3.125 mg by mouth,     Quantity: 90 tablet    Medication:     lactulose (CHRONULAC) 10 g/15 mL solution     Quantity: 240 ML    Pharmacy: [facility] [id number]     Does the patient have enough for 3 days?   [x] Yes   [] No - Send as HP to POD

## 2025-02-05 ENCOUNTER — TELEPHONE (OUTPATIENT)
Dept: GASTROENTEROLOGY | Facility: CLINIC | Age: 50
End: 2025-02-05

## 2025-02-05 RX ORDER — CARVEDILOL 3.12 MG/1
3.12 TABLET ORAL DAILY
Qty: 30 TABLET | Refills: 5 | Status: SHIPPED | OUTPATIENT
Start: 2025-02-05 | End: 2025-02-10

## 2025-02-05 RX ORDER — LACTULOSE 10 G/15ML
10 SOLUTION ORAL 2 TIMES DAILY
Qty: 900 ML | Refills: 2 | Status: SHIPPED | OUTPATIENT
Start: 2025-02-05 | End: 2025-02-13 | Stop reason: SDUPTHER

## 2025-02-05 NOTE — TELEPHONE ENCOUNTER
"Called back patients wife. Got VM. Asked her to please return call to office , that I have the fax # but need to know who to put fax \" attention to \" left office phone #. Made a copy of disability forms. Left originals on my desk as patient will  tomorrow. Will scan into patients chart.  "

## 2025-02-05 NOTE — TELEPHONE ENCOUNTER
Pt returned call and says he dose not have a specific  yet so it should just be attn to Short term disability

## 2025-02-05 NOTE — TELEPHONE ENCOUNTER
Pt calling back to ask that we fax over form to [phone]. Pt's wife will be by tomorrow to p/u the hard copy.

## 2025-02-05 NOTE — TELEPHONE ENCOUNTER
Called patient & got VM . DUNGOM that his [employer] Paperwork is ready to be picked up. Left office # asking that he return call to office to see if he was going to pick paperwork up or if he prefers it mailed or e mailed.

## 2025-02-06 DIAGNOSIS — J45.909 UNCOMPLICATED ASTHMA, UNSPECIFIED ASTHMA SEVERITY, UNSPECIFIED WHETHER PERSISTENT: ICD-10-CM

## 2025-02-06 NOTE — TELEPHONE ENCOUNTER
Needs appt   Patient's wife calling to confirm that scripts were sent. Made aware scripts were sent to pharmacy yesterday. She will follow up with pharmacy

## 2025-02-10 ENCOUNTER — APPOINTMENT (EMERGENCY)
Dept: CT IMAGING | Facility: HOSPITAL | Age: 50
End: 2025-02-10
Payer: COMMERCIAL

## 2025-02-10 ENCOUNTER — HOSPITAL ENCOUNTER (EMERGENCY)
Facility: HOSPITAL | Age: 50
Discharge: HOME | End: 2025-02-10
Payer: COMMERCIAL

## 2025-02-10 ENCOUNTER — ANESTHESIA EVENT (OUTPATIENT)
Dept: GASTROENTEROLOGY | Facility: HOSPITAL | Age: 50
End: 2025-02-10
Payer: COMMERCIAL

## 2025-02-10 ENCOUNTER — ANESTHESIA (OUTPATIENT)
Dept: GASTROENTEROLOGY | Facility: HOSPITAL | Age: 50
End: 2025-02-10
Payer: COMMERCIAL

## 2025-02-10 ENCOUNTER — NURSE TRIAGE (OUTPATIENT)
Age: 50
End: 2025-02-10

## 2025-02-10 ENCOUNTER — APPOINTMENT (EMERGENCY)
Dept: RADIOLOGY | Facility: HOSPITAL | Age: 50
End: 2025-02-10
Payer: COMMERCIAL

## 2025-02-10 ENCOUNTER — HOSPITAL ENCOUNTER (OUTPATIENT)
Dept: GASTROENTEROLOGY | Facility: HOSPITAL | Age: 50
Setting detail: OUTPATIENT SURGERY
Discharge: HOME | End: 2025-02-10
Payer: COMMERCIAL

## 2025-02-10 VITALS
TEMPERATURE: 98.8 F | DIASTOLIC BLOOD PRESSURE: 70 MMHG | HEIGHT: 71 IN | RESPIRATION RATE: 12 BRPM | WEIGHT: 175.04 LBS | HEART RATE: 58 BPM | SYSTOLIC BLOOD PRESSURE: 162 MMHG | OXYGEN SATURATION: 100 % | BODY MASS INDEX: 24.51 KG/M2

## 2025-02-10 VITALS
TEMPERATURE: 98.4 F | RESPIRATION RATE: 12 BRPM | OXYGEN SATURATION: 98 % | DIASTOLIC BLOOD PRESSURE: 78 MMHG | HEART RATE: 59 BPM | SYSTOLIC BLOOD PRESSURE: 167 MMHG

## 2025-02-10 DIAGNOSIS — G89.18 POSTOPERATIVE PAIN: Primary | ICD-10-CM

## 2025-02-10 DIAGNOSIS — I85.11 SECONDARY ESOPHAGEAL VARICES WITH BLEEDING (HCC): ICD-10-CM

## 2025-02-10 DIAGNOSIS — K29.30 CHRONIC SUPERFICIAL GASTRITIS WITHOUT BLEEDING: ICD-10-CM

## 2025-02-10 DIAGNOSIS — K70.11 ALCOHOLIC HEPATITIS WITH ASCITES: ICD-10-CM

## 2025-02-10 LAB
ALBUMIN SERPL BCG-MCNC: 3.4 G/DL (ref 3.5–5)
ALP SERPL-CCNC: 82 U/L (ref 34–104)
ALT SERPL W P-5'-P-CCNC: 16 U/L (ref 7–52)
ANION GAP SERPL CALCULATED.3IONS-SCNC: 6 MMOL/L (ref 4–13)
AST SERPL W P-5'-P-CCNC: 34 U/L (ref 13–39)
BASOPHILS # BLD AUTO: 0.06 THOUSANDS/ΜL (ref 0–0.1)
BASOPHILS NFR BLD AUTO: 1 % (ref 0–1)
BILIRUB SERPL-MCNC: 1.12 MG/DL (ref 0.2–1)
BUN SERPL-MCNC: 10 MG/DL (ref 5–25)
CALCIUM ALBUM COR SERPL-MCNC: 9.4 MG/DL (ref 8.3–10.1)
CALCIUM SERPL-MCNC: 8.9 MG/DL (ref 8.4–10.2)
CARDIAC TROPONIN I PNL SERPL HS: 3 NG/L (ref ?–50)
CHLORIDE SERPL-SCNC: 107 MMOL/L (ref 96–108)
CO2 SERPL-SCNC: 25 MMOL/L (ref 21–32)
CREAT SERPL-MCNC: 0.4 MG/DL (ref 0.6–1.3)
EOSINOPHIL # BLD AUTO: 0.17 THOUSAND/ΜL (ref 0–0.61)
EOSINOPHIL NFR BLD AUTO: 3 % (ref 0–6)
ERYTHROCYTE [DISTWIDTH] IN BLOOD BY AUTOMATED COUNT: 16.7 % (ref 11.6–15.1)
GFR SERPL CREATININE-BSD FRML MDRD: 137 ML/MIN/1.73SQ M
GLUCOSE SERPL-MCNC: 110 MG/DL (ref 65–140)
GLUCOSE SERPL-MCNC: 96 MG/DL (ref 65–140)
HCT VFR BLD AUTO: 30.6 % (ref 36.5–49.3)
HGB BLD-MCNC: 9.6 G/DL (ref 12–17)
IMM GRANULOCYTES # BLD AUTO: 0.01 THOUSAND/UL (ref 0–0.2)
IMM GRANULOCYTES NFR BLD AUTO: 0 % (ref 0–2)
LYMPHOCYTES # BLD AUTO: 1.64 THOUSANDS/ΜL (ref 0.6–4.47)
LYMPHOCYTES NFR BLD AUTO: 28 % (ref 14–44)
MCH RBC QN AUTO: 28.3 PG (ref 26.8–34.3)
MCHC RBC AUTO-ENTMCNC: 31.4 G/DL (ref 31.4–37.4)
MCV RBC AUTO: 90 FL (ref 82–98)
MONOCYTES # BLD AUTO: 0.64 THOUSAND/ΜL (ref 0.17–1.22)
MONOCYTES NFR BLD AUTO: 11 % (ref 4–12)
NEUTROPHILS # BLD AUTO: 3.36 THOUSANDS/ΜL (ref 1.85–7.62)
NEUTS SEG NFR BLD AUTO: 57 % (ref 43–75)
NRBC BLD AUTO-RTO: 0 /100 WBCS
PLATELET # BLD AUTO: 142 THOUSANDS/UL (ref 149–390)
PMV BLD AUTO: 10.1 FL (ref 8.9–12.7)
POTASSIUM SERPL-SCNC: 3.7 MMOL/L (ref 3.5–5.3)
PROT SERPL-MCNC: 7.1 G/DL (ref 6.4–8.4)
RBC # BLD AUTO: 3.39 MILLION/UL (ref 3.88–5.62)
SODIUM SERPL-SCNC: 138 MMOL/L (ref 135–147)
WBC # BLD AUTO: 5.88 THOUSAND/UL (ref 4.31–10.16)

## 2025-02-10 PROCEDURE — 96375 TX/PRO/DX INJ NEW DRUG ADDON: CPT

## 2025-02-10 PROCEDURE — 84484 ASSAY OF TROPONIN QUANT: CPT | Performed by: NURSE PRACTITIONER

## 2025-02-10 PROCEDURE — 71260 CT THORAX DX C+: CPT

## 2025-02-10 PROCEDURE — 96365 THER/PROPH/DIAG IV INF INIT: CPT

## 2025-02-10 PROCEDURE — 99285 EMERGENCY DEPT VISIT HI MDM: CPT

## 2025-02-10 PROCEDURE — 85025 COMPLETE CBC W/AUTO DIFF WBC: CPT | Performed by: NURSE PRACTITIONER

## 2025-02-10 PROCEDURE — 36415 COLL VENOUS BLD VENIPUNCTURE: CPT | Performed by: NURSE PRACTITIONER

## 2025-02-10 PROCEDURE — 43244 EGD VARICES LIGATION: CPT | Performed by: INTERNAL MEDICINE

## 2025-02-10 PROCEDURE — 93005 ELECTROCARDIOGRAM TRACING: CPT

## 2025-02-10 PROCEDURE — 82948 REAGENT STRIP/BLOOD GLUCOSE: CPT

## 2025-02-10 PROCEDURE — 99285 EMERGENCY DEPT VISIT HI MDM: CPT | Performed by: NURSE PRACTITIONER

## 2025-02-10 PROCEDURE — 71045 X-RAY EXAM CHEST 1 VIEW: CPT

## 2025-02-10 PROCEDURE — 80053 COMPREHEN METABOLIC PANEL: CPT | Performed by: NURSE PRACTITIONER

## 2025-02-10 RX ORDER — SODIUM CHLORIDE, SODIUM LACTATE, POTASSIUM CHLORIDE, CALCIUM CHLORIDE 600; 310; 30; 20 MG/100ML; MG/100ML; MG/100ML; MG/100ML
INJECTION, SOLUTION INTRAVENOUS CONTINUOUS PRN
Status: DISCONTINUED | OUTPATIENT
Start: 2025-02-10 | End: 2025-02-10

## 2025-02-10 RX ORDER — ESOMEPRAZOLE MAGNESIUM 40 MG/1
40 CAPSULE, DELAYED RELEASE ORAL AS NEEDED
Qty: 30 CAPSULE | Refills: 6 | Status: SHIPPED | OUTPATIENT
Start: 2025-02-10

## 2025-02-10 RX ORDER — ALBUTEROL SULFATE 90 UG/1
2 INHALANT RESPIRATORY (INHALATION) EVERY 6 HOURS PRN
Qty: 54 G | Refills: 0 | Status: SHIPPED | OUTPATIENT
Start: 2025-02-10

## 2025-02-10 RX ORDER — ONDANSETRON 2 MG/ML
4 INJECTION INTRAMUSCULAR; INTRAVENOUS ONCE
Status: COMPLETED | OUTPATIENT
Start: 2025-02-10 | End: 2025-02-10

## 2025-02-10 RX ORDER — SODIUM CHLORIDE, SODIUM GLUCONATE, SODIUM ACETATE, POTASSIUM CHLORIDE, MAGNESIUM CHLORIDE, SODIUM PHOSPHATE, DIBASIC, AND POTASSIUM PHOSPHATE .53; .5; .37; .037; .03; .012; .00082 G/100ML; G/100ML; G/100ML; G/100ML; G/100ML; G/100ML; G/100ML
1000 INJECTION, SOLUTION INTRAVENOUS ONCE
Status: COMPLETED | OUTPATIENT
Start: 2025-02-10 | End: 2025-02-10

## 2025-02-10 RX ORDER — PROPOFOL 10 MG/ML
INJECTION, EMULSION INTRAVENOUS AS NEEDED
Status: DISCONTINUED | OUTPATIENT
Start: 2025-02-10 | End: 2025-02-10

## 2025-02-10 RX ORDER — DIPHENHYDRAMINE HYDROCHLORIDE AND LIDOCAINE HYDROCHLORIDE AND ALUMINUM HYDROXIDE AND MAGNESIUM HYDRO
5 KIT EVERY 4 HOURS PRN
Qty: 119 ML | Refills: 0 | Status: SHIPPED | OUTPATIENT
Start: 2025-02-10 | End: 2025-02-13

## 2025-02-10 RX ORDER — DIPHENHYDRAMINE HYDROCHLORIDE AND LIDOCAINE HYDROCHLORIDE AND ALUMINUM HYDROXIDE AND MAGNESIUM HYDRO
10 KIT ONCE
Status: DISCONTINUED | OUTPATIENT
Start: 2025-02-10 | End: 2025-02-10 | Stop reason: HOSPADM

## 2025-02-10 RX ORDER — MORPHINE SULFATE 4 MG/ML
4 INJECTION, SOLUTION INTRAMUSCULAR; INTRAVENOUS ONCE
Status: COMPLETED | OUTPATIENT
Start: 2025-02-10 | End: 2025-02-10

## 2025-02-10 RX ORDER — LIDOCAINE HYDROCHLORIDE 20 MG/ML
INJECTION, SOLUTION EPIDURAL; INFILTRATION; INTRACAUDAL; PERINEURAL AS NEEDED
Status: DISCONTINUED | OUTPATIENT
Start: 2025-02-10 | End: 2025-02-10

## 2025-02-10 RX ORDER — OXYCODONE HYDROCHLORIDE 5 MG/1
5 TABLET ORAL EVERY 6 HOURS PRN
Qty: 20 TABLET | Refills: 0 | Status: SHIPPED | OUTPATIENT
Start: 2025-02-10

## 2025-02-10 RX ORDER — CARVEDILOL 3.12 MG/1
6.25 TABLET ORAL 2 TIMES DAILY WITH MEALS
Qty: 60 TABLET | Refills: 6 | Status: SHIPPED | OUTPATIENT
Start: 2025-02-10 | End: 2025-08-09

## 2025-02-10 RX ADMIN — IOHEXOL 85 ML: 350 INJECTION, SOLUTION INTRAVENOUS at 15:48

## 2025-02-10 RX ADMIN — SODIUM CHLORIDE, SODIUM GLUCONATE, SODIUM ACETATE, POTASSIUM CHLORIDE, MAGNESIUM CHLORIDE, SODIUM PHOSPHATE, DIBASIC, AND POTASSIUM PHOSPHATE 1000 ML: .53; .5; .37; .037; .03; .012; .00082 INJECTION, SOLUTION INTRAVENOUS at 15:26

## 2025-02-10 RX ADMIN — SODIUM CHLORIDE, SODIUM LACTATE, POTASSIUM CHLORIDE, AND CALCIUM CHLORIDE: .6; .31; .03; .02 INJECTION, SOLUTION INTRAVENOUS at 13:01

## 2025-02-10 RX ADMIN — PROPOFOL 50 MG: 10 INJECTION, EMULSION INTRAVENOUS at 13:09

## 2025-02-10 RX ADMIN — PROPOFOL 50 MG: 10 INJECTION, EMULSION INTRAVENOUS at 13:06

## 2025-02-10 RX ADMIN — PROPOFOL 150 MG: 10 INJECTION, EMULSION INTRAVENOUS at 13:05

## 2025-02-10 RX ADMIN — PROPOFOL 50 MG: 10 INJECTION, EMULSION INTRAVENOUS at 13:08

## 2025-02-10 RX ADMIN — ONDANSETRON 4 MG: 2 INJECTION INTRAMUSCULAR; INTRAVENOUS at 15:27

## 2025-02-10 RX ADMIN — MORPHINE SULFATE 4 MG: 4 INJECTION INTRAVENOUS at 15:26

## 2025-02-10 RX ADMIN — LIDOCAINE HYDROCHLORIDE 100 MG: 20 INJECTION, SOLUTION EPIDURAL; INFILTRATION; INTRACAUDAL; PERINEURAL at 13:05

## 2025-02-10 NOTE — TELEPHONE ENCOUNTER
"LOV:    HX:Hepatic encephalopathy, Cirrhosis of liver w/o ascites    Pt's wife calling. States pt just got home from EGD and is now having difficulty swallowing and chest pain.  Recommend ED for eval of chest pain.  Pt's wife verbalizes understanding.       Reason for Disposition   Chest pain and at least one cardiac risk factor (i.e., hypertension, diabetes, obesity, smoker, or strong family history of heart disease)    Answer Assessment - Initial Assessment Questions  1. DATE/TIME: \"When did you have your endoscopy?\"       Today   2. MAIN CONCERN: \"What is your main concern right now?\" \"What questions do you have?\"      Chest pain, diff swallowing   3. ADOMINAL PAIN: \"Are you having any abdominal (belly or stomach) pain?\" If Yes, ask: \"How bad is it?\" (e.g., Scale 1-10; mild, moderate, severe).     - MILD (1-3): doesn't interfere with normal activities, abdomen soft and not tender to touch      - MODERATE (4-7): interferes with normal activities or awakens from sleep, tender to touch      - SEVERE (8-10): excruciating pain, doubled over, unable to do any normal activities        Denies   4. OTHER SYMPTOMS: \"What other symptoms are you having?\" (e.g., breathing or swallowing problems, chest pain, throat pain, hoarseness, vomiting, stomach pain, bloating, fever, dizziness)      Denies   5. ONSET: \"When did your symptoms start?\"      Today    Protocols used: GI-Endoscopy (Upper GI) Symptoms and Questions-ADULT-[place]    "

## 2025-02-10 NOTE — ED PROVIDER NOTES
Time reflects when diagnosis was documented in both MDM as applicable and the Disposition within this note       Time User Action Codes Description Comment    2/10/2025  4:39 PM Camron Peña Add [G89.18] Postoperative pain           ED Disposition       ED Disposition   Discharge    Condition   Stable    Date/Time   Mon Feb 10, 2025  4:39 PM    Comment   Ashwin Wright discharge to home/self care.                   Assessment & Plan       Medical Decision Making  Patient improved after medication administration.  Discussed briefly with gastroenterology who made recommendation for Magic mouthwash for the discomfort.  Provided a prescription for at home.  Return precautions discussed    Amount and/or Complexity of Data Reviewed  Labs: ordered.  Radiology: ordered and independent interpretation performed.    Risk  Prescription drug management.             Medications   diphenhydramine, lidocaine, Al/Mg hydroxide, simethicone (Magic Mouthwash) oral solution 10 mL (has no administration in time range)   multi-electrolyte (ISOLYTE-S PH 7.4) bolus 1,000 mL (1,000 mL Intravenous New Bag 2/10/25 1526)   morphine injection 4 mg (4 mg Intravenous Given 2/10/25 1526)   ondansetron (ZOFRAN) injection 4 mg (4 mg Intravenous Given 2/10/25 1527)   iohexol (OMNIPAQUE) 350 MG/ML injection (MULTI-DOSE) 85 mL (85 mL Intravenous Given 2/10/25 1548)       ED Risk Strat Scores                          SBIRT 22yo+      Flowsheet Row Most Recent Value   Initial Alcohol Screen: US AUDIT-C     1. How often do you have a drink containing alcohol? 0 Filed at: 02/10/2025 1451   2. How many drinks containing alcohol do you have on a typical day you are drinking?  0 Filed at: 02/10/2025 1451   3a. Male UNDER 65: How often do you have five or more drinks on one occasion? 0 Filed at: 02/10/2025 1451   Audit-C Score 0 Filed at: 02/10/2025 1451   EUSEBIO: How many times in the past year have you...    Used an illegal drug or used a prescription  "medication for non-medical reasons? Never Filed at: 02/10/2025 1451                            History of Present Illness       Chief Complaint   Patient presents with    Chest Pain     Sharp chest pain and unable to swallow post EDG with 4 bands placed 30 minutes ago for \"tears\"        Past Medical History:   Diagnosis Date    Arthritis     Asthma     Chronic pain disorder     Chronic pancreatitis (HCC)     Cirrhosis (HCC)     early cirrhosis    GERD (gastroesophageal reflux disease)     History of COVID-19 01/2022    mild s/s    Hyperlipidemia     Hypertension     Liver abscess     Liver disease     Meniscus tear     left knee work injury last assessed 08/24/2016    Pancreatitis     Pneumonia     Rotator cuff tear     Stomach disorder     Chronic gastritus      Past Surgical History:   Procedure Laterality Date    CELIAC PLEXUS BLOCK Bilateral 10/29/2018    Procedure: SPLANCHNIC NERVE BLOCK;  Surgeon: Ramiro Chinchilla MD;  [facility];  Service: Pain Management     CELIAC PLEXUS BLOCK Bilateral 01/10/2019    Procedure: SPLANCHNIC NERVE BLOCK;  Surgeon: Ramiro Chinchilla MD;  [facility];  Service: Pain Management     CHOLECYSTECTOMY      COLONOSCOPY      ESOPHAGOGASTRODUODENOSCOPY N/A 11/16/2017    Procedure: ESOPHAGOGASTRODUODENOSCOPY (EGD);  Surgeon: Ever Bonner MD;  [facility];  Service: Gastroenterology    ESOPHAGOGASTRODUODENOSCOPY N/A 04/19/2018    Procedure: ESOPHAGOGASTRODUODENOSCOPY (EGD);  Surgeon: Orestes Forrest MD;  [facility];  Service: Gastroenterology    ESOPHAGOGASTRODUODENOSCOPY N/A 05/10/2018    Procedure: ESOPHAGOGASTRODUODENOSCOPY (EGD);  Surgeon: Vaibhav Matthew MD;  [facility];  Service: Gastroenterology    ESOPHAGUS SURGERY  01/17/2025    nubia galindo tear repain    HERNIA REPAIR Bilateral 02/03/2023    Procedure: REPAIR HERNIA INGUINAL, LAPAROSCOPIC,;  Surgeon: Juanjo Travis DO;  [facility];  Service: General    IR TEMPORARY " DIALYSIS CATHETER PLACEMENT  02/28/2019    KNEE ARTHROSCOPY Bilateral     KNEE ARTHROSCOPY Right 2009    cibishino last assessed 08/24/2016    KNEE ARTHROSCOPY Right 07/01/2019    LIVER SURGERY      NERVE BLOCK Bilateral 05/29/2018    Procedure: SPLANCHNIC NERVE BLOCK;  Surgeon: Ramiro Chinchilla MD;  [facility];  Service: Pain Management     PANCREAS SURGERY      stents    PANCREATIC CYST EXCISION      GA INJECTION ANES LMBR/THRC PARAVERTBRL SYMPATHETIC Bilateral 12/28/2017    Procedure: SPLANCHNIC NERVE BLOCK;  Surgeon: Ramiro Chinchilla MD;  [facility];  Service: Pain Management     GA INJX ANES CELIAC PLEXUS W/WO RADIOLOGIC MONITRNG Bilateral 05/09/2017    Procedure: CELIAC PLEXUS BLOCK ;  Surgeon: Ramiro Chinchilla MD;  [facility];  Service: Pain Management     GA INJX ANES CELIAC PLEXUS W/WO RADIOLOGIC MONITRNG Bilateral 06/01/2017    Procedure: SPLANCHNIC NERVE BLOCK at T12;  Surgeon: Ramiro Chinchilla MD;  [facility];  Service: Pain Management     GA INJX ANES CELIAC PLEXUS W/WO RADIOLOGIC MONITRNG Bilateral 08/08/2017    Procedure: BILATERAL SPLANCHNIC NERVE BLOCK T12;  Surgeon: Ramiro Chinchilla MD;  [facility];  Service: Pain Management     GA INJX ANES CELIAC PLEXUS W/WO RADIOLOGIC MONITRNG Bilateral 04/18/2019    Procedure: BLOCK / INJECTION CELIAC PLEXUS;  Surgeon: Ramiro Chinchilla MD;  [facility];  Service: Pain Management     GA INJX ANES CELIAC PLEXUS W/WO RADIOLOGIC MONITRNG Bilateral 08/20/2019    Procedure: SPLANCHNIC NERVE BLOCK;  Surgeon: Ramiro Chinchilla MD;  [facility];  Service: Pain Management     GA INJX ANES CELIAC PLEXUS W/WO RADIOLOGIC MONITRNG Bilateral 10/17/2019    Procedure: SPLANCHNIC NERVE BLOCK;  Surgeon: Ramiro Chinchilla MD;  [facility];  Service: Pain Management     GA LAPAROSCOPY SURG CHOLECYSTECTOMY N/A 02/14/2017    Procedure: LAPAROSCOPIC CHOLECYSTECTOMY, IOC, POSSIBLE OPEN.;  Surgeon: Suresh Lang MD;  [facility];  Service: General    RI SURGICAL ARTHROSCOPY SHOULDER W/ROTATOR CUFF RPR Left 06/09/2023    Procedure: Left Shoulder Arthroscopic Rotator Cuff Repair, Open Subpectoral Biceps Tenodesis, Acromioplasty, Extensive Debridement;  Surgeon: Toro Healy DO;  [facility];  Service: Orthopedics    ROTATOR CUFF REPAIR Right     SHOULDER ARTHROSCOPY      SHOULDER ARTHROSCOPY Right     SHOULDER SURGERY      TENDON REPAIR  2015    Right shoulder      Family History   Problem Relation Age of Onset    Cirrhosis Mother     Heart disease Other         cardiac disorder    Cancer Other       Social History     Tobacco Use    Smoking status: Former     Current packs/day: 0.00     Average packs/day: 1 pack/day for 20.0 years (20.0 ttl pk-yrs)     Types: Cigarettes     Start date: 3/15/2001     Quit date: 3/15/2021     Years since quitting: 3.9    Smokeless tobacco: Never   Vaping Use    Vaping status: Some Days    Substances: Nicotine   Substance Use Topics    Alcohol use: Yes     Alcohol/week: 1.0 standard drink of alcohol     Types: 1 Cans of beer per week     Comment: on weekends    Drug use: No      E-Cigarette/Vaping    E-Cigarette Use Current Some Day User       E-Cigarette/Vaping Substances    Nicotine Yes     THC No     CBD No     Flavoring No     Other No     Unknown No       I have reviewed and agree with the history as documented.     49-year-old male patient who is status post esophageal variceal banding x 2 today about an hour prior.  Was discharged and while he was outside of the hospital attempted to eat some applesauce and was unable to to swallow due to pain.  He has had previous esophageal variceal banding but at that time he was intubated and sedated so we cannot compare his discomfort today to that.  No nausea no vomiting.  No crepitus.  Will evaluate him for perforation.          Review of Systems   Constitutional:  Negative for chills and fever.   HENT:  Negative for ear pain and sore  throat.    Eyes:  Negative for pain and visual disturbance.   Respiratory:  Negative for cough and shortness of breath.    Cardiovascular:  Positive for chest pain. Negative for palpitations.   Gastrointestinal:  Negative for abdominal pain and vomiting.   Genitourinary:  Negative for dysuria and hematuria.   Musculoskeletal:  Negative for arthralgias and back pain.   Skin:  Negative for color change and rash.   Neurological:  Negative for seizures and syncope.   All other systems reviewed and are negative.          Objective       ED Triage Vitals   Temperature Pulse Blood Pressure Respirations SpO2 Patient Position - Orthostatic VS   02/10/25 1451 02/10/25 1451 02/10/25 1451 02/10/25 1451 02/10/25 1451 02/10/25 1451   98.4 °F (36.9 °C) 60 165/84 16 99 % Lying      Temp Source Heart Rate Source BP Location FiO2 (%) Pain Score    02/10/25 1451 02/10/25 1451 02/10/25 1451 -- 02/10/25 1526    Temporal Monitor Left arm  8      Vitals      Date and Time Temp Pulse SpO2 Resp BP Pain Score FACES Pain Rating User   02/10/25 1630 -- 59 98 % 12 167/78 -- -- LM   02/10/25 1530 -- 63 97 % 14 165/81 10 - Worst Possible Pain -- LM   02/10/25 1526 -- -- -- -- -- 8 -- LM   02/10/25 1515 -- 63 97 % 12 145/64 -- -- LM   02/10/25 1451 98.4 °F (36.9 °C) 60 99 % 16 165/84 -- -- CT            Physical Exam  Vitals and nursing note reviewed.   Constitutional:       General: He is not in acute distress.     Appearance: He is well-developed.   HENT:      Head: Normocephalic and atraumatic.      Nose: No rhinorrhea.   Eyes:      General:         Right eye: No discharge.         Left eye: No discharge.      Conjunctiva/sclera: Conjunctivae normal.   Cardiovascular:      Rate and Rhythm: Normal rate.   Pulmonary:      Effort: Pulmonary effort is normal. No respiratory distress.   Abdominal:      General: There is no distension.      Tenderness: There is no guarding.   Musculoskeletal:         General: No deformity.      Cervical back: Normal  range of motion and neck supple.   Skin:     General: Skin is warm and dry.      Coloration: Skin is not pale.   Neurological:      Mental Status: He is alert and oriented to person, place, and time.      Coordination: Coordination normal.   Psychiatric:         Mood and Affect: Mood normal.         Results Reviewed       Procedure Component Value Units Date/Time    HS Troponin 0hr (reflex protocol) [[id number]]  (Normal) Collected: 02/10/25 1534    Lab Status: Final result Specimen: Blood from Arm, Left Updated: 02/10/25 1605     hs TnI 0hr 3 ng/L     HS Troponin I 2hr [[id number]]     Lab Status: No result Specimen: Blood     HS Troponin I 4hr [[id number]]     Lab Status: No result Specimen: Blood     Comprehensive metabolic panel [[id number]]  (Abnormal) Collected: 02/10/25 1534    Lab Status: Final result Specimen: Blood from Arm, Left Updated: 02/10/25 1603     Sodium 138 mmol/L      Potassium 3.7 mmol/L      Chloride 107 mmol/L      CO2 25 mmol/L      ANION GAP 6 mmol/L      BUN 10 mg/dL      Creatinine 0.40 mg/dL      Glucose 110 mg/dL      Calcium 8.9 mg/dL      Corrected Calcium 9.4 mg/dL      AST 34 U/L      ALT 16 U/L      Alkaline Phosphatase 82 U/L      Total Protein 7.1 g/dL      Albumin 3.4 g/dL      Total Bilirubin 1.12 mg/dL      eGFR 137 ml/min/1.73sq m     Narrative:      National Kid[employer] Disease Foundation guidelines for Chronic Kidney Disease (CKD):     Stage 1 with normal or high GFR (GFR > 90 mL/min/1.73 square meters)    Stage 2 Mild CKD (GFR = 60-89 mL/min/1.73 square meters)    Stage 3A Moderate CKD (GFR = 45-59 mL/min/1.73 square meters)    Stage 3B Moderate CKD (GFR = 30-44 mL/min/1.73 square meters)    Stage 4 Severe CKD (GFR = 15-29 mL/min/1.73 square meters)    Stage 5 End Stage CKD (GFR <15 mL/min/1.73 square meters)  Note: GFR calculation is accurate only with a steady state creatinine    CBC and differential [[id number]]  (Abnormal) Collected: 02/10/25 1534    Lab Status: Final result  Specimen: Blood from Arm, Left Updated: 02/10/25 1540     WBC 5.88 Thousand/uL      RBC 3.39 Million/uL      Hemoglobin 9.6 g/dL      Hematocrit 30.6 %      MCV 90 fL      MCH 28.3 pg      MCHC 31.4 g/dL      RDW 16.7 %      MPV 10.1 fL      Platelets 142 Thousands/uL      nRBC 0 /100 WBCs      Segmented % 57 %      Immature Grans % 0 %      Lymphocytes % 28 %      Monocytes % 11 %      Eosinophils Relative 3 %      Basophils Relative 1 %      Absolute Neutrophils 3.36 Thousands/µL      Absolute Immature Grans 0.01 Thousand/uL      Absolute Lymphocytes 1.64 Thousands/µL      Absolute Monocytes 0.64 Thousand/µL      Eosinophils Absolute 0.17 Thousand/µL      Basophils Absolute 0.06 Thousands/µL             CT chest with contrast   Final Interpretation by Ronni Yin MD (02/10 1624)      No acute findings in the chest. No evidence of active bleeding.      Cirrhosis with portal hypertension. Abdominal and paraesophageal varices.               Workstation performed: [id number]         XR chest 1 view portable   ED Interpretation by LUANA Pacheco (02/10 1540)   No free air      Final Interpretation by Neha Claudio MD (02/10 1641)      No acute cardiopulmonary disease.   No free air seen         Workstation performed: [id number]             Procedures    ED Medication and Procedure Management   Prior to Admission Medications   Prescriptions Last Dose Informant Patient Reported? Taking?   Choline Fenofibrate (Fenofibric Acid) 135 MG CPDR  Self, Spouse/Significant Other No No   Sig: TAKE ONE CAPSULE BY MOUTH EVERY DAY   Empagliflozin 10 MG TABS  Self, Spouse/Significant Other Yes No   Sig: Take 10 mg by mouth every morning   Patient not taking: Reported on 1/30/2025   Insulin Pen Needle (Pen Needles) 32G X 5 MM MISC  Self, Spouse/Significant Other Yes No   Sig: use once daily as directed   Multiple Vitamins-Minerals (MULTIVITAMIN ADULTS PO)  Self, Spouse/Significant Other Yes No   Sig: Take by mouth daily  after breakfast   NON FORMULARY  Self, Spouse/Significant Other Yes No   Sig: Pantessin 1 capsule daily   Patient not taking: Reported on 2025   Pancrelipase, Lip-Prot-Amyl, (Creon) 97529-553926 units CPEP  Self, Spouse/Significant Other No No   Sig: TAKE 1 CAPSULE (36,000 UNITS TOTAL) BY MOUTH 3 (THREE) TIMES A DAY BEFORE MEALS   TURMERIC PO  Self, Spouse/Significant Other Yes No   Sig: Take by mouth in the morning   Patient not taking: Reported on 2025   Thiamine HCl (vitamin B-1) 250 MG tablet  Self, Spouse/Significant Other Yes No   Sig: Take 250 mg by mouth daily   albuterol (PROVENTIL HFA,VENTOLIN HFA) 90 mcg/act inhaler   No No   Sig: INHALE TWO PUFFS BY MOUTH EVERY 6 HOURS AS NEEDED FOR WHEEZING   amLODIPine (NORVASC) 5 mg tablet  Self, Spouse/Significant Other No No   Sig: Take 1 tablet (5 mg total) by mouth daily   busPIRone (BUSPAR) 5 mg tablet  Self, Spouse/Significant Other No No   Sig: Take 1 tablet (5 mg total) by mouth 2 (two) times a day   carvedilol (COREG) 3.125 mg tablet   No No   Sig: Take 2 tablets (6.25 mg total) by mouth 2 (two) times a day with meals   esomeprazole (NexIUM) 40 MG capsule   No No   Sig: Take 1 capsule (40 mg total) by mouth if needed (heartburn)   famotidine (PEPCID) 40 MG tablet  Self, Spouse/Significant Other Yes No   Sig: Take 40 mg by mouth daily at bedtime as needed   fluticasone (FLONASE) 50 mcg/act nasal spray  Self, Spouse/Significant Other No No   Si spray into each nostril daily   Patient not taking: Reported on 2025   insulin degludec (Tresiba FlexTouch) 100 units/mL injection pen  Self, Spouse/Significant Other Yes No   Si units subcut in hs   lactulose (CHRONULAC) 10 g/15 mL solution   No No   Sig: Take 15 mL (10 g total) by mouth 2 (two) times a day Titrate dose to have 2-3 bowel movements daily. Take an additional dose if you are not having 2-3 bowel movements daily.   lisinopril (ZESTRIL) 20 mg tablet  Self, Spouse/Significant Other No  No   Sig: TAKE ONE TABLET BY MOUTH EVERY DAY   Patient not taking: Reported on 1/30/2025   mirtazapine (REMERON) 7.5 MG tablet   No No   Sig: Take 1 tablet (7.5 mg total) by mouth daily at bedtime   omega-3-acid ethyl esters (LOVAZA) 1 g capsule  Self, Spouse/Significant Other No No   Sig: TAKE TWO CAPSULES BY MOUTH TWICE DAILY   Patient not taking: Reported on 8/19/2024   ondansetron (ZOFRAN-ODT) 4 mg disintegrating tablet  Self, Spouse/Significant Other No No   Sig: Take 1 tablet (4 mg total) by mouth every 8 (eight) hours as needed for nausea or vomiting   rosuvastatin (CRESTOR) 40 MG tablet  Self, Spouse/Significant Other No No   Sig: TAKE ONE TABLET BY MOUTH EVERY DAY      Facility-Administered Medications: None     Patient's Medications   Discharge Prescriptions    DIPHENHYDRAMINE, LIDOCAINE, AL/MG HYDROXIDE, SIMETHICONE (MAGIC MOUTHWASH) SUSP    Swish and swallow 5 mL every 4 (four) hours as needed for mouth pain or discomfort for up to 3 days       Start Date: 2/10/2025 End Date: 2/13/2025       Order Dose: 5 mL       Quantity: 119 mL    Refills: 0    OXYCODONE (ROXICODONE) 5 IMMEDIATE RELEASE TABLET    Take 1 tablet (5 mg total) by mouth every 6 (six) hours as needed for moderate pain for up to 12 doses Max Daily Amount: 20 mg       Start Date: 2/10/2025 End Date: --       Order Dose: 5 mg       Quantity: 20 tablet    Refills: 0     No discharge procedures on file.  ED SEPSIS DOCUMENTATION   Time reflects when diagnosis was documented in both MDM as applicable and the Disposition within this note       Time User Action Codes Description Comment    2/10/2025  4:39 PM Camron Peña [G89.18] Postoperative pain                  LUANA Pacheco  02/10/25 1708

## 2025-02-10 NOTE — INTERVAL H&P NOTE
H&P reviewed. After examining the patient I find no changes in the patients condition since the H&P had been written.    Vitals:    02/10/25 1140   BP: 125/60   Pulse: 67   Resp: 16   Temp: 98.1 °F (36.7 °C)   SpO2: 99%

## 2025-02-10 NOTE — ANESTHESIA POSTPROCEDURE EVALUATION
Post-Op Assessment Note    CV Status:  Stable    Pain management: adequate       Mental Status:  Sleepy   Hydration Status:  Euvolemic   PONV Controlled:  Controlled   Airway Patency:  Patent  Two or more mitigation strategies used for obstructive sleep apnea   Post Op Vitals Reviewed: Yes    No anethesia notable event occurred.    Staff: Anesthesiologist, CRNA           Last Filed PACU Vitals:  Vitals Value Taken Time   Temp 98.7    Pulse 80    /81    Resp 20    SpO2 98

## 2025-02-10 NOTE — ANESTHESIA PREPROCEDURE EVALUATION
Procedure:  EGD    Relevant Problems   CARDIO   (+) Hypertension   (+) Hypertriglyceridemia   (+) RBBB      ENDO   (+) Controlled type 2 diabetes mellitus with hyperglycemia, with long-term current use of insulin (HCC)      GI/HEPATIC   (+) Acute on chronic pancreatitis (HCC)   (+) Chronic pancreatitis (HCC)   (+) Cirrhosis of liver without ascites, unspecified hepatic cirrhosis type (HCC)   (+) GERD (gastroesophageal reflux disease)   (+) Liver abscess   (+) Pancreatic lesion   (+) Pancreatitis, unspecified pancreatitis type      MUSCULOSKELETAL   (+) Cervical spondylosis    Recent EGD with banding 1/16/2025 in setting of hematemesis.    Physical Exam    Airway    Mallampati score: II  TM Distance: >3 FB  Neck ROM: full     Dental       Cardiovascular  Cardiovascular exam normal    Pulmonary  Pulmonary exam normal     Other Findings        Anesthesia Plan  ASA Score- 4     Anesthesia Type- IV sedation with anesthesia with ASA Monitors.         Additional Monitors:     Airway Plan:            Plan Factors-Exercise tolerance (METS): >4 METS.    Chart reviewed. EKG reviewed. Imaging results reviewed. Existing labs reviewed. Patient summary reviewed.    Patient is not a current smoker.              Induction- intravenous.    Postoperative Plan-         Informed Consent- Anesthetic plan and risks discussed with patient.  I personally reviewed this patient with the CRNA. Discussed and agreed on the Anesthesia Plan with the CRNA..      NPO Status:  Vitals Value Taken Time   Date of last liquid 02/10/25 02/10/25 1135   Time of last liquid 0800 02/10/25 1135   Date of last solid 02/09/25 02/10/25 1135   Time of last solid 2230 02/10/25 1135

## 2025-02-11 ENCOUNTER — TELEPHONE (OUTPATIENT)
Age: 50
End: 2025-02-11

## 2025-02-11 LAB
ATRIAL RATE: 60 BPM
P AXIS: 31 DEGREES
PR INTERVAL: 120 MS
QRS AXIS: 74 DEGREES
QRSD INTERVAL: 106 MS
QT INTERVAL: 434 MS
QTC INTERVAL: 434 MS
T WAVE AXIS: 58 DEGREES
VENTRICULAR RATE: 60 BPM

## 2025-02-11 PROCEDURE — 93010 ELECTROCARDIOGRAM REPORT: CPT | Performed by: INTERNAL MEDICINE

## 2025-02-11 NOTE — TELEPHONE ENCOUNTER
Scheduled date of EGD(as of today):3/5/25  Physician performing EGD:DR VIDALES  Location of EGD:[facility]  Instructions reviewed with patient by:DERICK  Clearances: NA

## 2025-02-12 ENCOUNTER — TELEPHONE (OUTPATIENT)
Dept: GASTROENTEROLOGY | Facility: CLINIC | Age: 50
End: 2025-02-12

## 2025-02-12 NOTE — TELEPHONE ENCOUNTER
Patients GI provider: BROOKLYNN Mensah    Number to return call: [phone]    Reason for call: Pt's wife called in regard to lactulose medication. Pt's wife stated they need clarification as to how much pt should be taking and how many times a day.    Scheduled procedure/appointment date if applicable: 3/5/2025

## 2025-02-12 NOTE — TELEPHONE ENCOUNTER
Please advise    Pt wife calling in, reports pt is only have 1 BM a day and today did not have a BM. She is aware to titrate dose to have 2-3 bowel movements a day.     She reports after EGD was told to triple dose but she is unsure what that means- if at one time take 45 ml BID or 15 mL more frequently throughout the day.      [phone]

## 2025-02-12 NOTE — TELEPHONE ENCOUNTER
lactulose (CHRONULAC) 10 g/15 mL solution [[id number]]    Order Details  Dose: 10 g Route: Oral Frequency: 2 times daily   Dispense Quantity: 900 mL Refills: 2          Sig: Take 15 mL (10 g total) by mouth 2 (two) times a day Titrate dose to have 2-3 bowel movements daily. Take an additional dose if you are not having 2-3 bowel movements daily.         Left detailed msg for patient of medication directiions

## 2025-02-13 DIAGNOSIS — K76.82 HEPATIC ENCEPHALOPATHY (HCC): ICD-10-CM

## 2025-02-13 RX ORDER — LACTULOSE 10 G/15ML
30 SOLUTION ORAL 2 TIMES DAILY
Qty: 2700 ML | Refills: 2 | Status: SHIPPED | OUTPATIENT
Start: 2025-02-13

## 2025-02-13 NOTE — TELEPHONE ENCOUNTER
Yes lets do 45 mL BID and see how he does with that. I can send more of the lactulose if they need it.

## 2025-02-13 NOTE — TELEPHONE ENCOUNTER
I spoke with Mrs. Wright and informed her that updated script was submitted to [facility].

## 2025-02-17 ENCOUNTER — TELEPHONE (OUTPATIENT)
Dept: GASTROENTEROLOGY | Facility: CLINIC | Age: 50
End: 2025-02-17

## 2025-02-18 ENCOUNTER — TELEPHONE (OUTPATIENT)
Age: 50
End: 2025-02-18

## 2025-02-18 NOTE — TELEPHONE ENCOUNTER
Called & spoke to patients wife Geraldine. Patient is trying to get MLA from his employer  and they are asking for a letter stating that is is under Drs care for  a serious medical condition ( without going in to to much detail) that he is being by this office and expected date to return to work or to state will be out of work till further notice. Explained to her that Dinora is out of the  office till Thursday 2/20/2025 and will give her the message then    Patients wife voiced understanding and had no further questions or cocnerns

## 2025-02-18 NOTE — TELEPHONE ENCOUNTER
Patients GI provider:  Dinora Mensah PA-C    Number to return call: ([phone]    Reason for call: Pt wife calling to have Dinora call back to discuss FMLA forms. Pt or his wife would like a call back to get this taken care of for the pt job.     Scheduled procedure/appointment date if applicable: N/A

## 2025-02-20 ENCOUNTER — TELEPHONE (OUTPATIENT)
Dept: GASTROENTEROLOGY | Facility: CLINIC | Age: 50
End: 2025-02-20

## 2025-02-20 NOTE — TELEPHONE ENCOUNTER
Called and spoke to patients wife, Geraldine . Let her know that Dinora wrote the letter for the patient. She asked that we E Mail it to her @ [email or web address].

## 2025-02-20 NOTE — TELEPHONE ENCOUNTER
I already filled out [place] paperwork for him with a return to date work I believe it was sometime in April?   Is this a separate note or another form from his work that needs to be filled out?

## 2025-02-24 ENCOUNTER — TELEPHONE (OUTPATIENT)
Age: 50
End: 2025-02-24

## 2025-02-24 NOTE — TELEPHONE ENCOUNTER
Patients GI provider:  Dinora Mensah    Number to return call: [phone]    Reason for call: Pt wife called on the contact and pt gave permission to speak with her requesting the Doctor letter or note showing that pt was in hospital on 12/31/24 in ER as he was in ER couple of times stated she has the letter from [facility] for 1/16 to 1/21 already. But requesting the letter from  For 12/31/25 advised that letter should have given to him form ER hospital, she said she has to discharge papers but not the letter. Tried calling the [place] office twice but was disconnected twice, please call pt and advise how she can get that letter.    Scheduled procedure/appointment date if applicable: procedure  3/5/25

## 2025-02-25 ENCOUNTER — TELEPHONE (OUTPATIENT)
Age: 50
End: 2025-02-25

## 2025-02-25 ENCOUNTER — RESULTS FOLLOW-UP (OUTPATIENT)
Dept: GASTROENTEROLOGY | Facility: CLINIC | Age: 50
End: 2025-02-25

## 2025-02-25 LAB
ALBUMIN SERPL-MCNC: 4.1 G/DL (ref 3.6–5.1)
ALBUMIN/GLOB SERPL: 1.1 (CALC) (ref 1–2.5)
ALP SERPL-CCNC: 90 U/L (ref 35–144)
ALT SERPL-CCNC: 12 U/L (ref 9–46)
AST SERPL-CCNC: 26 U/L (ref 10–35)
BILIRUB SERPL-MCNC: 1 MG/DL (ref 0.2–1.2)
BUN SERPL-MCNC: 14 MG/DL (ref 7–25)
BUN/CREAT SERPL: 22 (CALC) (ref 6–22)
CALCIUM SERPL-MCNC: 9.7 MG/DL (ref 8.6–10.3)
CHLORIDE SERPL-SCNC: 105 MMOL/L (ref 98–110)
CO2 SERPL-SCNC: 28 MMOL/L (ref 20–32)
CREAT SERPL-MCNC: 0.64 MG/DL (ref 0.7–1.3)
ERYTHROCYTE [DISTWIDTH] IN BLOOD BY AUTOMATED COUNT: 15.9 % (ref 11–15)
GFR/BSA.PRED SERPLBLD CYS-BASED-ARV: 115 ML/MIN/1.73M2
GLOBULIN SER CALC-MCNC: 3.9 G/DL (CALC) (ref 1.9–3.7)
GLUCOSE SERPL-MCNC: 104 MG/DL (ref 65–99)
HCT VFR BLD AUTO: 34.7 % (ref 38.5–50)
HGB BLD-MCNC: 10.8 G/DL (ref 13.2–17.1)
INR PPP: 1.2
MCH RBC QN AUTO: 26.9 PG (ref 27–33)
MCHC RBC AUTO-ENTMCNC: 31.1 G/DL (ref 32–36)
MCV RBC AUTO: 86.3 FL (ref 80–100)
PLATELET # BLD AUTO: 156 THOUSAND/UL (ref 140–400)
PMV BLD REES-ECKER: 11.6 FL (ref 7.5–12.5)
POTASSIUM SERPL-SCNC: 4.4 MMOL/L (ref 3.5–5.3)
PROT SERPL-MCNC: 8 G/DL (ref 6.1–8.1)
PROTHROMBIN TIME: 12.6 SEC (ref 9–11.5)
RBC # BLD AUTO: 4.02 MILLION/UL (ref 4.2–5.8)
SODIUM SERPL-SCNC: 139 MMOL/L (ref 135–146)
WBC # BLD AUTO: 5.4 THOUSAND/UL (ref 3.8–10.8)

## 2025-02-25 NOTE — TELEPHONE ENCOUNTER
Pt wife calling in checking if anyone reviewed pt's labs from yesterday. Informed pt that the provider has not reviewed them yet but will send a message requesting they do so. Pt wife questioning whether the labs are moving in the right direction and is looking for a little reassurance considering what the pt has been through in the past few weeks. Please call the pt's number [phone], as the wife will be at work.

## 2025-02-25 NOTE — TELEPHONE ENCOUNTER
I'm sorry I wasn't the person or part of the specialty group that saw him 12/31, it was the ER, so I cannot provide a letter for that date.

## 2025-02-27 ENCOUNTER — TELEPHONE (OUTPATIENT)
Age: 50
End: 2025-02-27

## 2025-02-27 NOTE — TELEPHONE ENCOUNTER
Patients GI provider:  Dr. Burgos    Number to return call: [phone]    Reason for call: [facility] calling to let us know they sent a fax to request the pts chart note from 1/30/25. They also said that they need the code for the procedure from 2/10/25 for EGD. I did provide them with [id number] please reach back out on the fax if additional codes are needed    Scheduled procedure/appointment date if applicable: procedure 3/5/25

## 2025-02-27 NOTE — TELEPHONE ENCOUNTER
I spoke with patient and wife, I explained that our office was not able to provide the requested letter but he find out if his PCP can do that for him

## 2025-02-28 DIAGNOSIS — E78.1 HYPERTRIGLYCERIDEMIA: ICD-10-CM

## 2025-02-28 DIAGNOSIS — E78.2 MIXED HYPERLIPIDEMIA: ICD-10-CM

## 2025-02-28 RX ORDER — FENOFIBRIC ACID 135 MG/1
1 CAPSULE, DELAYED RELEASE ORAL DAILY
Qty: 30 CAPSULE | Refills: 0 | Status: SHIPPED | OUTPATIENT
Start: 2025-02-28

## 2025-02-28 NOTE — TELEPHONE ENCOUNTER
Received paper work from THE [place] VIA FAX. [employer].. calling the [place] to see if they can send better copies..........* [phone].

## 2025-03-03 NOTE — TELEPHONE ENCOUNTER
Wife Geraldine (on Communication Consent form) called. Checking to see if we received new forms faxed by Milli today. Advised not in EMR yet.

## 2025-03-03 NOTE — TELEPHONE ENCOUNTER
Called & spoke to patients wife maria fernanda. Let her know we received paper work from [facility] [place].. & that richard will be in office 3/4/2025 ( Tuesday ) and will give it to her then. She voiced understanding and had no further questions or cocnerns

## 2025-03-05 ENCOUNTER — ANESTHESIA EVENT (OUTPATIENT)
Dept: GASTROENTEROLOGY | Facility: HOSPITAL | Age: 50
End: 2025-03-05
Payer: COMMERCIAL

## 2025-03-05 ENCOUNTER — HOSPITAL ENCOUNTER (OUTPATIENT)
Dept: GASTROENTEROLOGY | Facility: HOSPITAL | Age: 50
Setting detail: OUTPATIENT SURGERY
Discharge: HOME | End: 2025-03-05
Attending: INTERNAL MEDICINE
Payer: COMMERCIAL

## 2025-03-05 ENCOUNTER — TELEPHONE (OUTPATIENT)
Dept: GASTROENTEROLOGY | Facility: CLINIC | Age: 50
End: 2025-03-05

## 2025-03-05 ENCOUNTER — ANESTHESIA (OUTPATIENT)
Dept: GASTROENTEROLOGY | Facility: HOSPITAL | Age: 50
End: 2025-03-05
Payer: COMMERCIAL

## 2025-03-05 VITALS
HEART RATE: 66 BPM | OXYGEN SATURATION: 96 % | RESPIRATION RATE: 16 BRPM | SYSTOLIC BLOOD PRESSURE: 147 MMHG | BODY MASS INDEX: 25.34 KG/M2 | HEIGHT: 71 IN | WEIGHT: 181 LBS | DIASTOLIC BLOOD PRESSURE: 76 MMHG | TEMPERATURE: 97.8 F

## 2025-03-05 DIAGNOSIS — I85.11 SECONDARY ESOPHAGEAL VARICES WITH BLEEDING (HCC): ICD-10-CM

## 2025-03-05 LAB — GLUCOSE SERPL-MCNC: 126 MG/DL (ref 65–140)

## 2025-03-05 PROCEDURE — 82948 REAGENT STRIP/BLOOD GLUCOSE: CPT

## 2025-03-05 PROCEDURE — 43244 EGD VARICES LIGATION: CPT | Performed by: INTERNAL MEDICINE

## 2025-03-05 RX ORDER — PROPOFOL 10 MG/ML
INJECTION, EMULSION INTRAVENOUS AS NEEDED
Status: DISCONTINUED | OUTPATIENT
Start: 2025-03-05 | End: 2025-03-05

## 2025-03-05 RX ORDER — LIDOCAINE HYDROCHLORIDE 20 MG/ML
INJECTION, SOLUTION EPIDURAL; INFILTRATION; INTRACAUDAL; PERINEURAL AS NEEDED
Status: DISCONTINUED | OUTPATIENT
Start: 2025-03-05 | End: 2025-03-05

## 2025-03-05 RX ORDER — SODIUM CHLORIDE, SODIUM LACTATE, POTASSIUM CHLORIDE, CALCIUM CHLORIDE 600; 310; 30; 20 MG/100ML; MG/100ML; MG/100ML; MG/100ML
50 INJECTION, SOLUTION INTRAVENOUS CONTINUOUS
Status: DISCONTINUED | OUTPATIENT
Start: 2025-03-05 | End: 2025-03-09 | Stop reason: HOSPADM

## 2025-03-05 RX ADMIN — LIDOCAINE HYDROCHLORIDE 100 MG: 20 INJECTION, SOLUTION EPIDURAL; INFILTRATION; INTRACAUDAL; PERINEURAL at 08:43

## 2025-03-05 RX ADMIN — PROPOFOL 180 MG: 10 INJECTION, EMULSION INTRAVENOUS at 08:43

## 2025-03-05 RX ADMIN — PROPOFOL 50 MG: 10 INJECTION, EMULSION INTRAVENOUS at 08:46

## 2025-03-05 RX ADMIN — SODIUM CHLORIDE, SODIUM LACTATE, POTASSIUM CHLORIDE, AND CALCIUM CHLORIDE 50 ML/HR: .6; .31; .03; .02 INJECTION, SOLUTION INTRAVENOUS at 07:34

## 2025-03-05 NOTE — ANESTHESIA POSTPROCEDURE EVALUATION
Post-Op Assessment Note    CV Status:  Stable    Pain management: satisfactory to patient       Mental Status:  Awake and sleepy   Hydration Status:  Euvolemic   PONV Controlled:  Controlled   Airway Patency:  Patent     Post Op Vitals Reviewed: Yes    No anethesia notable event occurred.    Staff: Anesthesiologist, CRNA           Last Filed PACU Vitals:  Vitals Value Taken Time   Temp 97.8 °F (36.6 °C) 03/05/25 0852   Pulse 79 03/05/25 0852   /63 03/05/25 0852   Resp 16 03/05/25 0852   SpO2 95 % 03/05/25 0852       Modified Roger:     Vitals Value Taken Time   Activity 2 03/05/25 0852   Respiration 2 03/05/25 0852   Circulation 2 03/05/25 0852   Consciousness 1 03/05/25 0852   Oxygen Saturation 2 03/05/25 0852     Modified Roger Score: 9

## 2025-03-05 NOTE — TELEPHONE ENCOUNTER
Called patients wife and got ONEYDA JARAMILLO  that completed forms were faxed to [facility] [place] @ [phone]. Left office # as well

## 2025-03-05 NOTE — H&P
History and Physical - [facility]  Ashwin Wright 49 y.o. male MRN: [id number]                  HPI: Ashwin Wright is a 49 y.o. year old male who presents for egd with banding for varices      REVIEW OF SYSTEMS: Per the HPI, and otherwise unremarkable.    Historical Information   Past Medical History:   Diagnosis Date    Arthritis     Asthma     Chronic pain disorder     Chronic pancreatitis (HCC)     Cirrhosis (HCC)     early cirrhosis    GERD (gastroesophageal reflux disease)     History of COVID-19 01/2022    mild s/s    Hyperlipidemia     Hypertension     Liver abscess     Liver disease     Meniscus tear     left knee work injury last assessed 08/24/2016    Pancreatitis     Pneumonia     Rotator cuff tear     Stomach disorder     Chronic gastritus     Past Surgical History:   Procedure Laterality Date    CELIAC PLEXUS BLOCK Bilateral 10/29/2018    Procedure: SPLANCHNIC NERVE BLOCK;  Surgeon: Ramiro Chinchilla MD;  [facility];  Service: Pain Management     CELIAC PLEXUS BLOCK Bilateral 01/10/2019    Procedure: SPLANCHNIC NERVE BLOCK;  Surgeon: Ramiro Chinchilla MD;  [facility];  Service: Pain Management     CHOLECYSTECTOMY      COLONOSCOPY      ESOPHAGOGASTRODUODENOSCOPY N/A 11/16/2017    Procedure: ESOPHAGOGASTRODUODENOSCOPY (EGD);  Surgeon: Ever Bonner MD;  [facility];  Service: Gastroenterology    ESOPHAGOGASTRODUODENOSCOPY N/A 04/19/2018    Procedure: ESOPHAGOGASTRODUODENOSCOPY (EGD);  Surgeon: Orestes Forrest MD;  [facility];  Service: Gastroenterology    ESOPHAGOGASTRODUODENOSCOPY N/A 05/10/2018    Procedure: ESOPHAGOGASTRODUODENOSCOPY (EGD);  Surgeon: Vaibhav Matthew MD;  [facility];  Service: Gastroenterology    ESOPHAGUS SURGERY  01/17/2025    nubia galindo tear repain    HERNIA REPAIR Bilateral 02/03/2023    Procedure: REPAIR HERNIA INGUINAL, LAPAROSCOPIC,;  Surgeon: Juanjo Travis DO;  [facility];  Service: General    IR  TEMPORARY DIALYSIS CATHETER PLACEMENT  02/28/2019    KNEE ARTHROSCOPY Bilateral     KNEE ARTHROSCOPY Right 2009    cibishino last assessed 08/24/2016    KNEE ARTHROSCOPY Right 07/01/2019    LIVER SURGERY      NERVE BLOCK Bilateral 05/29/2018    Procedure: SPLANCHNIC NERVE BLOCK;  Surgeon: Ramiro Chinchilla MD;  [facility];  Service: Pain Management     PANCREAS SURGERY      stents    PANCREATIC CYST EXCISION      NE INJECTION ANES LMBR/THRC PARAVERTBRL SYMPATHETIC Bilateral 12/28/2017    Procedure: SPLANCHNIC NERVE BLOCK;  Surgeon: Ramiro Chinchilla MD;  [facility];  Service: Pain Management     NE INJX ANES CELIAC PLEXUS W/WO RADIOLOGIC MONITRNG Bilateral 05/09/2017    Procedure: CELIAC PLEXUS BLOCK ;  Surgeon: Ramiro Chinchilla MD;  [facility];  Service: Pain Management     NE INJX ANES CELIAC PLEXUS W/WO RADIOLOGIC MONITRNG Bilateral 06/01/2017    Procedure: SPLANCHNIC NERVE BLOCK at T12;  Surgeon: Ramiro Chinchilla MD;  [facility];  Service: Pain Management     NE INJX ANES CELIAC PLEXUS W/WO RADIOLOGIC MONITRNG Bilateral 08/08/2017    Procedure: BILATERAL SPLANCHNIC NERVE BLOCK T12;  Surgeon: Ramiro Chinchilla MD;  [facility];  Service: Pain Management     NE INJX ANES CELIAC PLEXUS W/WO RADIOLOGIC MONITRNG Bilateral 04/18/2019    Procedure: BLOCK / INJECTION CELIAC PLEXUS;  Surgeon: Ramiro Chinchilla MD;  [facility];  Service: Pain Management     NE INJX ANES CELIAC PLEXUS W/WO RADIOLOGIC MONITRNG Bilateral 08/20/2019    Procedure: SPLANCHNIC NERVE BLOCK;  Surgeon: Ramiro Chinchilla MD;  [facility];  Service: Pain Management     NE INJX ANES CELIAC PLEXUS W/WO RADIOLOGIC MONITRNG Bilateral 10/17/2019    Procedure: SPLANCHNIC NERVE BLOCK;  Surgeon: Ramiro Chinchilla MD;  [facility];  Service: Pain Management     NE LAPAROSCOPY SURG CHOLECYSTECTOMY N/A 02/14/2017    Procedure: LAPAROSCOPIC CHOLECYSTECTOMY, IOC, POSSIBLE OPEN.;  Surgeon: Suresh Lang MD;   "[facility];  Service: General    NV SURGICAL ARTHROSCOPY SHOULDER W/ROTATOR CUFF RPR Left 06/09/2023    Procedure: Left Shoulder Arthroscopic Rotator Cuff Repair, Open Subpectoral Biceps Tenodesis, Acromioplasty, Extensive Debridement;  Surgeon: Toro Healy DO;  [facility];  Service: Orthopedics    ROTATOR CUFF REPAIR Right     SHOULDER ARTHROSCOPY      SHOULDER ARTHROSCOPY Right     SHOULDER SURGERY      TENDON REPAIR  2015    Right shoulder     Social History   Social History     Substance and Sexual Activity   Alcohol Use Not Currently    Alcohol/week: 1.0 standard drink of alcohol    Types: 1 Cans of beer per week    Comment: on weekends     Social History     Substance and Sexual Activity   Drug Use No     Social History     Tobacco Use   Smoking Status Former    Current packs/day: 0.00    Average packs/day: 1 pack/day for 20.0 years (20.0 ttl pk-yrs)    Types: Cigarettes    Start date: 3/15/2001    Quit date: 3/15/2021    Years since quitting: 3.9   Smokeless Tobacco Never     Family History   Problem Relation Age of Onset    Cirrhosis Mother     Heart disease Other         cardiac disorder    Cancer Other        Meds/Allergies     Not in a hospital admission.    Allergies   Allergen Reactions    Bee Venom Swelling    Percocet [Oxycodone-Acetaminophen] GI Bleeding, Vomiting and Abdominal Pain       Objective     Blood pressure 131/66, pulse 69, temperature 98.1 °F (36.7 °C), temperature source Temporal, resp. rate 16, height 5' 11\" (1.803 m), weight 82.1 kg (181 lb), SpO2 100%.      PHYSICAL EXAM    /66   Pulse 69   Temp 98.1 °F (36.7 °C) (Temporal)   Resp 16   Ht 5' 11\" (1.803 m)   Wt 82.1 kg (181 lb)   SpO2 100%   BMI 25.24 kg/m²       Gen: NAD  CV: RRR  CHEST: Clear  ABD: soft, NT/ND  EXT: no edema      ASSESSMENT/PLAN:  This is a 49 y.o. year old male here for endoscopy, and he is stable and optimized for his procedure.        "

## 2025-03-05 NOTE — ANESTHESIA PREPROCEDURE EVALUATION
Procedure:  EGD    Relevant Problems   CARDIO   (+) Hypertension   (+) Hypertriglyceridemia   (+) RBBB      ENDO   (+) Controlled type 2 diabetes mellitus with hyperglycemia, with long-term current use of insulin (HCC)      GI/HEPATIC   (+) Acute on chronic pancreatitis (HCC)   (+) Chronic pancreatitis (HCC)   (+) Cirrhosis of liver without ascites, unspecified hepatic cirrhosis type (HCC)   (+) GERD (gastroesophageal reflux disease)   (+) Liver abscess   (+) Pancreatic lesion   (+) Pancreatitis, unspecified pancreatitis type      MUSCULOSKELETAL   (+) Cervical spondylosis      Other   (+) Severe protein-calorie malnutrition (HCC)      Hyperlipidemia    Pancreatitis    Meniscus tear left knee work injury last assessed 08/24/2016   Liver abscess    Asthma    GERD (gastroesophageal reflux disease)    Chronic pain disorder    Pneumonia    Hypertension    Chronic pancreatitis (HCC)    Cirrhosis (HCC) early cirrhosis   Liver disease    Arthritis    Rotator cuff tear    History of COVID-19 mild s/s   Stomach disorder Chronic gastritus            Left Ventricle: Systolic function is normal with an ejection fraction   of 55-60%.     Aortic Valve: The aortic valve is trileaflet. There is mild sclerosis.     Mitral Valve: There is trace regurgitation.     Tricuspid Valve: There is trace regurgitation.     Pulmonic Valve: The estimated pulmonary artery systolic pressure is   23.7 mmHg. Normal pulmonary artery pressure.       Physical Exam    Airway    Mallampati score: II  TM Distance: >3 FB  Neck ROM: full     Dental       Cardiovascular  Cardiovascular exam normal    Pulmonary  Pulmonary exam normal     Other Findings        Anesthesia Plan  ASA Score- 3     Anesthesia Type- IV sedation with anesthesia with ASA Monitors.         Additional Monitors:     Airway Plan:            Plan Factors-Exercise tolerance (METS): >4 METS.    Chart reviewed. EKG reviewed. Imaging results reviewed. Existing labs reviewed. Patient summary  reviewed.                  Induction- intravenous.    Postoperative Plan-         Informed Consent- Anesthetic plan and risks discussed with patient.  I personally reviewed this patient with the CRNA. Discussed and agreed on the Anesthesia Plan with the CRNA..      NPO Status:  Vitals Value Taken Time   Date of last liquid 03/05/25 03/05/25 0725   Time of last liquid 0630 03/05/25 0725   Date of last solid 03/04/25 03/05/25 0725   Time of last solid 1800 03/05/25 0725

## 2025-03-05 NOTE — TELEPHONE ENCOUNTER
Dinora completed forms from [facility] [place] for patients sort term disability. (Scanned in chart, copies were legible ) .. Will fax to . Will also call patient to let them know they have been faxed.

## 2025-03-11 ENCOUNTER — TELEPHONE (OUTPATIENT)
Age: 50
End: 2025-03-11

## 2025-03-11 ENCOUNTER — OFFICE VISIT (OUTPATIENT)
Dept: GASTROENTEROLOGY | Facility: CLINIC | Age: 50
End: 2025-03-11
Payer: COMMERCIAL

## 2025-03-11 VITALS
SYSTOLIC BLOOD PRESSURE: 122 MMHG | BODY MASS INDEX: 25.37 KG/M2 | OXYGEN SATURATION: 98 % | DIASTOLIC BLOOD PRESSURE: 78 MMHG | HEART RATE: 77 BPM | TEMPERATURE: 97.8 F | RESPIRATION RATE: 18 BRPM | HEIGHT: 71 IN | WEIGHT: 181.2 LBS

## 2025-03-11 DIAGNOSIS — K76.82 HEPATIC ENCEPHALOPATHY (HCC): ICD-10-CM

## 2025-03-11 DIAGNOSIS — I85.11 SECONDARY ESOPHAGEAL VARICES WITH BLEEDING (HCC): ICD-10-CM

## 2025-03-11 DIAGNOSIS — K70.11 ALCOHOLIC HEPATITIS WITH ASCITES: ICD-10-CM

## 2025-03-11 DIAGNOSIS — K74.60 CIRRHOSIS OF LIVER WITHOUT ASCITES, UNSPECIFIED HEPATIC CIRRHOSIS TYPE (HCC): Primary | ICD-10-CM

## 2025-03-11 PROCEDURE — 99214 OFFICE O/P EST MOD 30 MIN: CPT | Performed by: PHYSICIAN ASSISTANT

## 2025-03-11 NOTE — LETTER
[facility] [place]  [address]  [place] [address]  [phone]  Dept: [phone]    March 11, 2025     Patient: Ashwin Wright   YOB: 1975   Date of Visit:        To Whom it May Concern:    Ashwin Wright is under my professional care. He was seen in my office on 3/11/25. He is being treated for a serious medical condition that will require frequent office visits and procedures and has not recovered to return to work on 4/1/25. Tentatively, Ashwin may return to work on 4/15/2025 and if this date changes based on his condition, we can update his return to work date.    If you have any questions or concerns, please don't hesitate to call.         Sincerely,          Dinora Mensah PA-C

## 2025-03-11 NOTE — TELEPHONE ENCOUNTER
Pt's wife Geraldine called, says  PT needs the dates on his excuse letter for work corrected.  On date of visit should be 3/11/2025 and return to work will be 4/15/2025. PT says we can send to his My Chart.

## 2025-03-11 NOTE — ASSESSMENT & PLAN NOTE
- Had acute EV bleeding in January at [facility], s/p banding then as well as repeat banding in February and again earlier this month  - Continue carvedilol 6.25 mg BID  - Monitor for evidence of bleeding  Orders:    CBC and Platelet; Future

## 2025-03-11 NOTE — ASSESSMENT & PLAN NOTE
- No confusion since his hospitalization almost 2 months ago but not having BMs for up to 2-3 days at a time despite lactulose 4 TBsp TID  - Will add miralax 17 g daily with goal of going at least 1x/daily  - Add rifaximin 550 mg BID to help reduce HE as well  - We will check ammonia level on next labs but discussed that this may not be helpful if he is not actively confused  Orders:    Comprehensive metabolic panel; Future    Protime-INR; Future    Ammonia; Future    rifaximin (XIFAXAN) 550 mg tablet; Take 1 tablet (550 mg total) by mouth every 12 (twelve) hours

## 2025-03-11 NOTE — LETTER
[facility] [place]  [address]  [place] [address]  [phone]  Dept: [phone]    March 11, 2025     Patient: Ashwin Wright   YOB: 1975   Date of Visit:        To Whom it May Concern:       Ashwin Wright is under our professional care. Ashwin was seen in my office on 1/30/2025. He is being treated for a serious medical condition that will require frequent office visits and procedures. Tentatively, Ashwin may return to work on 4/15/2025 and if this date changes based on his condition, we can update his return to work date.     If you have any questions or concerns, please don't hesitate to call.      Sincerely,          Dinora Mensah PA-C

## 2025-03-11 NOTE — PROGRESS NOTES
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  - No alcohol use in 2 months  - Grade 0 HE currently; see HE below for management  - No ascites or LE edema  - EGD last week with small EV s/p banding resulting in eradication so will plan for repeat EGD in 1 year  - HCC screen neg in January with US with dopplers; will need repeat imaging in July  - No indication for transplant referral now given low MELD  - Follow up in 6-8 weeks; will repeat MELD labs   Orders:    Comprehensive metabolic panel; Future    Protime-INR; Future    Ammonia; Future    Alcoholic hepatitis with ascites    Orders:    Comprehensive metabolic panel; Future    Protime-INR; Future    Ammonia; Future    Hepatic encephalopathy (HCC)  - No confusion since his hospitalization almost 2 months ago but not having BMs for up to 2-3 days at a time despite lactulose 4 TBsp TID  - Will add miralax 17 g daily with goal of going at least 1x/daily  - Add rifaximin 550 mg BID to help reduce HE as well  - We will check ammonia level on next labs but discussed that this may not be helpful if he is not actively confused  Orders:    Comprehensive metabolic panel; Future    Protime-INR; Future    Ammonia; Future    rifaximin (XIFAXAN) 550 mg tablet; Take 1 tablet (550 mg total) by mouth every 12 (twelve) hours    Secondary esophageal varices with bleeding (HCC)  - Had acute EV bleeding in January at [facility], s/p banding then as well as repeat banding in February and again earlier this month  - Continue carvedilol 6.25 mg BID  - Monitor for evidence of bleeding  Orders:    CBC and Platelet; Future        History of Present Illness    HPI  Ashwin Wright is a 50 y.o. male who presents for 6-week follow-up of alcoholic hepatitis and cirrhosis.  He was last seen at the end of January which was a few weeks after his hospitalization with esophageal variceal bleeding and alcoholic hepatitis as well as hepatic encephalopathy.  He is really doing well overall and has not had any alcohol in 2 months but is struggling with having bowel movements despite taking lactulose 4 tablespoons 3 times a day.  He will go for 2 or 3 days at a time.  He is having some abdominal discomfort that could be related to that.  There are days where he will have many bowel movements that are frequent and he feels he can even leave the house and then will have several days of not going at all.  There is been no confusion despite that.  He has not had no evidence of bleeding.  No nausea or vomiting.  No fevers.  They are also very concerned about him going back to work and driving in case he was to get confused or have acute bleeding so they are trying to see if there are any other jobs that he could do.   History obtained from: patient    Review of Systems  Medical History Reviewed by provider this encounter:  Tobacco  Allergies  Meds  Problems  Med Hx  Surg Hx  Fam Hx     .  Current Outpatient Medications on File Prior to Visit   Medication Sig Dispense Refill    albuterol (PROVENTIL HFA,VENTOLIN HFA) 90 mcg/act inhaler INHALE TWO PUFFS BY MOUTH EVERY 6 HOURS AS NEEDED FOR WHEEZING 54 g 0    amLODIPine (NORVASC) 5 mg tablet Take 1 tablet (5 mg total) by mouth daily 90 tablet 1    busPIRone (BUSPAR) 5 mg tablet Take 1 tablet (5 mg total) by mouth 2 (two) times a day 60 tablet 5    carvedilol (COREG) 3.125 mg tablet Take 2 tablets (6.25 mg total) by mouth 2 (two) times a day with meals 60 tablet 6    Choline Fenofibrate (Fenofibric Acid) 135 MG CPDR TAKE ONE CAPSULE BY MOUTH DAILY 30 capsule 0    Empagliflozin 10 MG TABS Take 10 mg by mouth every morning       esomeprazole (NexIUM) 40 MG capsule Take 1 capsule (40 mg total) by mouth if needed (heartburn) 30 capsule 6    famotidine (PEPCID) 40 MG tablet Take 40 mg by mouth daily at bedtime as needed      insulin degludec (Tresiba FlexTouch) 100 units/mL injection pen 20 units subcut in hs      Insulin Pen Needle (Pen Needles) 32G X 5 MM MISC use once daily as directed      lactulose (CHRONULAC) 10 g/15 mL solution Take 45 mL (30 g total) by mouth 2 (two) times a day Titrate dose to have 2-3 bowel movements daily. Take an additional dose if you are not having 2-3 bowel movements daily. 2700 mL 2    mirtazapine (REMERON) 7.5 MG tablet Take 1 tablet (7.5 mg total) by mouth daily at bedtime 30 tablet 0    Multiple Vitamins-Minerals (MULTIVITAMIN ADULTS PO) Take by mouth daily after breakfast      ondansetron (ZOFRAN-ODT) 4 mg disintegrating tablet Take 1 tablet (4 mg total) by mouth every 8 (eight) hours as needed for nausea or vomiting 30 tablet 0    oxyCODONE (Roxicodone) 5 immediate release tablet Take 1 tablet (5 mg total) by mouth every 6 (six) hours as needed for moderate pain for up to 12 doses Max Daily Amount: 20 mg 20 tablet 0    Pancrelipase, Lip-Prot-Amyl, (Creon) 23149-122445 units CPEP TAKE 1 CAPSULE (36,000 UNITS TOTAL) BY MOUTH 3 (THREE) TIMES A DAY BEFORE MEALS 270 capsule 1    rosuvastatin (CRESTOR) 40 MG tablet TAKE ONE TABLET BY MOUTH EVERY DAY 90 tablet 1    fluticasone (FLONASE) 50 mcg/act nasal spray 1 spray into each nostril daily (Patient not taking: Reported on 1/30/2025) 9 mL 1    lisinopril (ZESTRIL) 20 mg tablet TAKE ONE TABLET BY MOUTH EVERY DAY (Patient not taking: Reported on 3/11/2025) 90 tablet 1    NON FORMULARY Pantessin 1 capsule daily (Patient not taking: Reported on 1/30/2025)      omega-3-acid ethyl esters (LOVAZA) 1 g capsule TAKE TWO CAPSULES BY MOUTH TWICE DAILY (Patient not taking: Reported on 8/19/2024) 360 capsule 0    Thiamine HCl (vitamin B-1) 250 MG tablet Take 250 mg by mouth  "daily      TURMERIC PO Take by mouth in the morning (Patient not taking: Reported on 1/30/2025)       No current facility-administered medications on file prior to visit.         Objective   /78 (BP Location: Left arm, Patient Position: Sitting, Cuff Size: Standard)   Pulse 77   Temp 97.8 °F (36.6 °C) (Tympanic)   Resp 18   Ht 5' 11\" (1.803 m)   Wt 82.2 kg (181 lb 3.2 oz)   SpO2 98%   BMI 25.27 kg/m²      Physical Exam  General- No acute distress, appears well, no confusion, answering questions appropriately  "

## 2025-03-11 NOTE — ASSESSMENT & PLAN NOTE
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

## 2025-03-21 ENCOUNTER — APPOINTMENT (EMERGENCY)
Dept: CT IMAGING | Facility: HOSPITAL | Age: 50
End: 2025-03-21
Payer: COMMERCIAL

## 2025-03-21 ENCOUNTER — HOSPITAL ENCOUNTER (EMERGENCY)
Facility: HOSPITAL | Age: 50
Discharge: HOME | End: 2025-03-21
Payer: COMMERCIAL

## 2025-03-21 ENCOUNTER — APPOINTMENT (EMERGENCY)
Dept: RADIOLOGY | Facility: HOSPITAL | Age: 50
End: 2025-03-21
Payer: COMMERCIAL

## 2025-03-21 ENCOUNTER — NURSE TRIAGE (OUTPATIENT)
Age: 50
End: 2025-03-21

## 2025-03-21 VITALS
SYSTOLIC BLOOD PRESSURE: 143 MMHG | RESPIRATION RATE: 18 BRPM | HEART RATE: 85 BPM | TEMPERATURE: 98.2 F | DIASTOLIC BLOOD PRESSURE: 81 MMHG | OXYGEN SATURATION: 98 %

## 2025-03-21 DIAGNOSIS — K70.30 ALCOHOLIC CIRRHOSIS (HCC): ICD-10-CM

## 2025-03-21 DIAGNOSIS — D64.9 CHRONIC ANEMIA: ICD-10-CM

## 2025-03-21 DIAGNOSIS — F10.920 ALCOHOLIC INTOXICATION WITHOUT COMPLICATION (HCC): Primary | ICD-10-CM

## 2025-03-21 LAB
ALBUMIN SERPL BCG-MCNC: 4.3 G/DL (ref 3.5–5)
ALP SERPL-CCNC: 103 U/L (ref 34–104)
ALT SERPL W P-5'-P-CCNC: 21 U/L (ref 7–52)
AMMONIA PLAS-SCNC: 28 UMOL/L (ref 18–72)
ANION GAP SERPL CALCULATED.3IONS-SCNC: 10 MMOL/L (ref 4–13)
APAP SERPL-MCNC: <2 UG/ML (ref 10–20)
AST SERPL W P-5'-P-CCNC: 39 U/L (ref 13–39)
BASOPHILS # BLD AUTO: 0.04 THOUSANDS/ÂΜL (ref 0–0.1)
BASOPHILS NFR BLD AUTO: 1 % (ref 0–1)
BILIRUB SERPL-MCNC: 1.05 MG/DL (ref 0.2–1)
BUN SERPL-MCNC: 10 MG/DL (ref 5–25)
CALCIUM SERPL-MCNC: 9.5 MG/DL (ref 8.4–10.2)
CARDIAC TROPONIN I PNL SERPL HS: <2 NG/L (ref ?–50)
CHLORIDE SERPL-SCNC: 106 MMOL/L (ref 96–108)
CK SERPL-CCNC: 71 U/L (ref 39–308)
CO2 SERPL-SCNC: 27 MMOL/L (ref 21–32)
CREAT SERPL-MCNC: 0.61 MG/DL (ref 0.6–1.3)
EOSINOPHIL # BLD AUTO: 0.09 THOUSAND/ÂΜL (ref 0–0.61)
EOSINOPHIL NFR BLD AUTO: 2 % (ref 0–6)
ERYTHROCYTE [DISTWIDTH] IN BLOOD BY AUTOMATED COUNT: 17.6 % (ref 11.6–15.1)
ETHANOL SERPL-MCNC: 362 MG/DL
GFR SERPL CREATININE-BSD FRML MDRD: 114 ML/MIN/1.73SQ M
GLUCOSE SERPL-MCNC: 114 MG/DL (ref 65–140)
GLUCOSE SERPL-MCNC: 123 MG/DL (ref 65–140)
HCT VFR BLD AUTO: 34.8 % (ref 36.5–49.3)
HGB BLD-MCNC: 10.4 G/DL (ref 12–17)
IMM GRANULOCYTES # BLD AUTO: 0.03 THOUSAND/UL (ref 0–0.2)
IMM GRANULOCYTES NFR BLD AUTO: 1 % (ref 0–2)
INR PPP: 1.27 (ref 0.85–1.19)
LIPASE SERPL-CCNC: 22 U/L (ref 11–82)
LYMPHOCYTES # BLD AUTO: 1.84 THOUSANDS/ÂΜL (ref 0.6–4.47)
LYMPHOCYTES NFR BLD AUTO: 35 % (ref 14–44)
MAGNESIUM SERPL-MCNC: 2 MG/DL (ref 1.9–2.7)
MCH RBC QN AUTO: 24.5 PG (ref 26.8–34.3)
MCHC RBC AUTO-ENTMCNC: 29.9 G/DL (ref 31.4–37.4)
MCV RBC AUTO: 82 FL (ref 82–98)
MONOCYTES # BLD AUTO: 0.54 THOUSAND/ÂΜL (ref 0.17–1.22)
MONOCYTES NFR BLD AUTO: 10 % (ref 4–12)
NEUTROPHILS # BLD AUTO: 2.71 THOUSANDS/ÂΜL (ref 1.85–7.62)
NEUTS SEG NFR BLD AUTO: 51 % (ref 43–75)
NRBC BLD AUTO-RTO: 0 /100 WBCS
PLATELET # BLD AUTO: 108 THOUSANDS/UL (ref 149–390)
PMV BLD AUTO: 9.5 FL (ref 8.9–12.7)
POTASSIUM SERPL-SCNC: 3.8 MMOL/L (ref 3.5–5.3)
PROT SERPL-MCNC: 7.6 G/DL (ref 6.4–8.4)
PROTHROMBIN TIME: 16.6 SECONDS (ref 12.3–15)
RBC # BLD AUTO: 4.25 MILLION/UL (ref 3.88–5.62)
SALICYLATES SERPL-MCNC: <5 MG/DL (ref 3–20)
SODIUM SERPL-SCNC: 143 MMOL/L (ref 135–147)
WBC # BLD AUTO: 5.25 THOUSAND/UL (ref 4.31–10.16)

## 2025-03-21 PROCEDURE — 99285 EMERGENCY DEPT VISIT HI MDM: CPT

## 2025-03-21 PROCEDURE — 85025 COMPLETE CBC W/AUTO DIFF WBC: CPT

## 2025-03-21 PROCEDURE — 80179 DRUG ASSAY SALICYLATE: CPT

## 2025-03-21 PROCEDURE — 36415 COLL VENOUS BLD VENIPUNCTURE: CPT

## 2025-03-21 PROCEDURE — 82550 ASSAY OF CK (CPK): CPT

## 2025-03-21 PROCEDURE — 74177 CT ABD & PELVIS W/CONTRAST: CPT

## 2025-03-21 PROCEDURE — 82948 REAGENT STRIP/BLOOD GLUCOSE: CPT

## 2025-03-21 PROCEDURE — 85610 PROTHROMBIN TIME: CPT

## 2025-03-21 PROCEDURE — 70450 CT HEAD/BRAIN W/O DYE: CPT

## 2025-03-21 PROCEDURE — 96360 HYDRATION IV INFUSION INIT: CPT

## 2025-03-21 PROCEDURE — 93005 ELECTROCARDIOGRAM TRACING: CPT

## 2025-03-21 PROCEDURE — 80053 COMPREHEN METABOLIC PANEL: CPT

## 2025-03-21 PROCEDURE — 80143 DRUG ASSAY ACETAMINOPHEN: CPT

## 2025-03-21 PROCEDURE — 82140 ASSAY OF AMMONIA: CPT

## 2025-03-21 PROCEDURE — 83735 ASSAY OF MAGNESIUM: CPT

## 2025-03-21 PROCEDURE — 83690 ASSAY OF LIPASE: CPT

## 2025-03-21 PROCEDURE — 71260 CT THORAX DX C+: CPT

## 2025-03-21 PROCEDURE — 82077 ASSAY SPEC XCP UR&BREATH IA: CPT

## 2025-03-21 PROCEDURE — 84484 ASSAY OF TROPONIN QUANT: CPT

## 2025-03-21 PROCEDURE — 72125 CT NECK SPINE W/O DYE: CPT

## 2025-03-21 PROCEDURE — 71045 X-RAY EXAM CHEST 1 VIEW: CPT

## 2025-03-21 RX ADMIN — SODIUM CHLORIDE 1000 ML: 0.9 INJECTION, SOLUTION INTRAVENOUS at 18:31

## 2025-03-21 RX ADMIN — IOHEXOL 100 ML: 350 INJECTION, SOLUTION INTRAVENOUS at 18:17

## 2025-03-21 NOTE — TELEPHONE ENCOUNTER
"Pt's wife, Geraldine Wright calling to have message sent to Dinora CHAIDEZ PA-C or Dr Burgos.  States pt is on his way to the hospital for confusion which she thinks may be due to his cirrhosis.  Going to [facility].     for [phone]     Reason for Disposition   Health information question, no triage required and triager able to answer question    Answer Assessment - Initial Assessment Questions  1. REASON FOR CALL: \"What is the main reason for your call?\" or \"How can I best help you?\"      Pt going to ED for confusion.  Wife wanted to make GI providers aware.  2. SYMPTOMS : \"Do you have any symptoms?\"       Confusion   3. OTHER QUESTIONS: \"Do you have any other questions?\"      No    Protocols used: Information Only Call - No Triage-Adult-[place]    "

## 2025-03-21 NOTE — DISCHARGE INSTRUCTIONS
Do not consume alcohol.    Follow up with GI specialist as soon as possible.     Return to the ED with any new/concerning issues.

## 2025-03-23 NOTE — ED PROVIDER NOTES
Time reflects when diagnosis was documented in both MDM as applicable and the Disposition within this note       Time User Action Codes Description Comment    3/21/2025  7:33 PM Juanjo Garcia [F10.920] Alcoholic intoxication without complication (HCC)     3/21/2025  7:33 PM Juanjo Garcia [K70.30] Alcoholic cirrhosis (HCC)     3/21/2025  7:33 PM Juanjo Garcia [D64.9] Chronic anemia           ED Disposition       ED Disposition   Discharge    Condition   Stable    Date/Time   Fri Mar 21, 2025  7:33 PM    Comment   Ashwin Wright discharge to home/self care.                   Assessment & Plan       Medical Decision Making  50 yom with hx chronic pancreatitis, alcoholic cirrhosis, esophageal varices, GERD, gastritis, DM, presenting to the ED via EMS for suspected alcohol intoxication. Per EMS, patient was found by his wife sitting on the floor in their home crying hysterically with suspected alcohol intoxication. No SI or HI voiced per report. Unknown trauma, though patient states he did not fall. Patient states that he drank 3 shots at a bar today, then drove home. He states he did not fall. Has known hx alcohol use disorder, but states he has never experienced withdrawal symptoms. Denies coingestions. Wife arrived in the ED and states that she believes he was drunk several days ago as well. Patient states he was not drunk a few days ago, but after having multiple arguments with his wife about his sobriety, decided to drink today. Patient currently denies any headache, neck pain, back pain, chest pain, shortness of breath, hemoptysis, abdominal pain, vomiting, bloody stools, urinary complaints.    Given hx of alcohol use disorder and varices/cirrhosis, will check CBC, CMP, Ammonia level for evidence of encephalopathy. Given unclear hx of why patient was on the floor crying, will order trauma scans including CT head, cervical spine, c/a/p to r/o acute traumatic injury. Patient given IVF.     Diagnostics  reviewed with wife and patient at bedside. Workup significant for alcohol intoxication with EtOH 362. Despite this, patient is clinically sober - with clear speech and steady gait pattern. Cts are unremarkable for acute emergent pathology. Otherwise labs WNL. Discussed with wife at bedside and she is comfortable transporting patient home and assuming care for him upon discharge from the ED. Patient has GI specialist who follows him closely. Patient strongly encouraged to abstain from the use of alcohol given hx of cirrhosis and to follow up with GI as soon as possible.    I reviewed all testing with the patient:   I gave oral return precautions for what to return for in addition to the written return precautions.   The patient (and any family present: wife) verbalized understanding of the discharge instructions and warnings that would necessitate return to the Emergency Department.  I specifically highlighted areas of special concern regarding the written and verbal discharge instructions and return precautions.    All questions were answered prior to discharge.      Amount and/or Complexity of Data Reviewed  Labs: ordered. Decision-making details documented in ED Course.  Radiology: ordered and independent interpretation performed.    Risk  Prescription drug management.        ED Course as of 03/23/25 1705   Fri Mar 21, 2025   1647 CXR: No acute cardiopulmonary disease as interpreted by me     1702 Hemoglobin(!): 10.4  Baseline anemia     1720 EKG shows normal sinus rhythm at 86 bpm, incomplete right bundle branch block, no acute ischemic changes as interpreted by me.   1720 ETHANOL(!): 362   1726 Total CK: 71  Reassuring against rhabdomyolysis   1727 hs TnI 0hr: <2  Reassuring against acute coronary syndrome   1916 IMPRESSION:     No acute intracranial abnormality.     1917 IMPRESSION:     No cervical spine fracture or traumatic malalignment.     1939 IMPRESSION:     No findings of acute traumatic injury in the  chest, abdomen or pelvis.  Stable findings of cirrhosis with hepatosplenomegaly, recanalization of the umbilical vein, and prominent varices. There is no ascites.     1939 On re-evaluation, patient is alert awake, hemodynamically stable. Patient is ambulatory around the ED and has clear speech with steady gait. Discussed lab and CT results with patient and wife at bedside. No acute concerning abnormalities. Patient's wife feels comfortable assuming care for the patient and is agreeable to taking full responsibility for him upon discharge from the ED. Patient was encouraged to discontinue alcohol use immediately given his hx of cirrhosis, also encouraged to connect with GI early next week. Patient agreeable with the plan.    I reviewed all testing with the patient:  I gave oral return precautions for what to return for in addition to the written return precautions.   The patient (and any family present: wife) verbalized understanding of the discharge instructions and warnings that would necessitate return to the Emergency Department.  I specifically highlighted areas of special concern regarding the written and verbal discharge instructions and return precautions.    All questions were answered prior to discharge.         Medications   sodium chloride 0.9 % bolus 1,000 mL (0 mL Intravenous Stopped 3/21/25 1932)   iohexol (OMNIPAQUE) 350 MG/ML injection (MULTI-DOSE) 100 mL (100 mL Intravenous Given 3/21/25 1817)       ED Risk Strat Scores                            SBIRT 20yo+      Flowsheet Row Most Recent Value   Initial Alcohol Screen: US AUDIT-C     1. How often do you have a drink containing alcohol? 1 Filed at: 03/21/2025 1627   2. How many drinks containing alcohol do you have on a typical day you are drinking?  0 Filed at: 03/21/2025 1627   3a. Male UNDER 65: How often do you have five or more drinks on one occasion? 0 Filed at: 03/21/2025 1627   Audit-C Score 1 Filed at: 03/21/2025 1627   EUSEBIO: How many times  in the past year have you...    Used an illegal drug or used a prescription medication for non-medical reasons? Never Filed at: 03/21/2025 6517                            History of Present Illness       Chief Complaint   Patient presents with    Alcohol Intoxication     Pt arrived via EMS, ETOH +, hx cirrhosis. Pt wife found pt on the floor drunk crying emotionally as per ems. Wife would like blood work regarding liver labs       Past Medical History:   Diagnosis Date    Arthritis     Asthma     Chronic pain disorder     Chronic pancreatitis (HCC)     Cirrhosis (HCC)     early cirrhosis    GERD (gastroesophageal reflux disease)     History of COVID-19 01/2022    mild s/s    Hyperlipidemia     Hypertension     Liver abscess     Liver disease     Meniscus tear     left knee work injury last assessed 08/24/2016    Pancreatitis     Pneumonia     Rotator cuff tear     Stomach disorder     Chronic gastritus      Past Surgical History:   Procedure Laterality Date    CELIAC PLEXUS BLOCK Bilateral 10/29/2018    Procedure: SPLANCHNIC NERVE BLOCK;  Surgeon: Ramiro Chinchilla MD;  [facility];  Service: Pain Management     CELIAC PLEXUS BLOCK Bilateral 01/10/2019    Procedure: SPLANCHNIC NERVE BLOCK;  Surgeon: Ramiro Chinchilla MD;  [facility];  Service: Pain Management     CHOLECYSTECTOMY      COLONOSCOPY      ESOPHAGOGASTRODUODENOSCOPY N/A 11/16/2017    Procedure: ESOPHAGOGASTRODUODENOSCOPY (EGD);  Surgeon: Ever Bonner MD;  Location: [facility];  Service: Gastroenterology    ESOPHAGOGASTRODUODENOSCOPY N/A 04/19/2018    Procedure: ESOPHAGOGASTRODUODENOSCOPY (EGD);  Surgeon: Orestes Forrest MD;  [facility];  Service: Gastroenterology    ESOPHAGOGASTRODUODENOSCOPY N/A 05/10/2018    Procedure: ESOPHAGOGASTRODUODENOSCOPY (EGD);  Surgeon: Vaibhav Matthew MD;  [facility];  Service: Gastroenterology    ESOPHAGUS SURGERY  01/17/2025    nubia galindo tear repain    HERNIA REPAIR Bilateral 02/03/2023     Procedure: REPAIR HERNIA INGUINAL, LAPAROSCOPIC,;  Surgeon: Juanjo Travis DO;  [facility];  Service: General    IR TEMPORARY DIALYSIS CATHETER PLACEMENT  02/28/2019    KNEE ARTHROSCOPY Bilateral     KNEE ARTHROSCOPY Right 2009    cibishino last assessed 08/24/2016    KNEE ARTHROSCOPY Right 07/01/2019    LIVER SURGERY      NERVE BLOCK Bilateral 05/29/2018    Procedure: SPLANCHNIC NERVE BLOCK;  Surgeon: Ramiro Chinchilla MD;  [facility];  Service: Pain Management     PANCREAS SURGERY      stents    PANCREATIC CYST EXCISION      NE INJECTION ANES LMBR/THRC PARAVERTBRL SYMPATHETIC Bilateral 12/28/2017    Procedure: SPLANCHNIC NERVE BLOCK;  Surgeon: Ramiro Chinchilla MD;  [facility];  Service: Pain Management     NE INJX ANES CELIAC PLEXUS W/WO RADIOLOGIC MONITRNG Bilateral 05/09/2017    Procedure: CELIAC PLEXUS BLOCK ;  Surgeon: Ramiro Chinchilla MD;  [facility];  Service: Pain Management     NE INJX ANES CELIAC PLEXUS W/WO RADIOLOGIC MONITRNG Bilateral 06/01/2017    Procedure: SPLANCHNIC NERVE BLOCK at T12;  Surgeon: Ramiro Chinchilla MD;  [facility];  Service: Pain Management     NE INJX ANES CELIAC PLEXUS W/WO RADIOLOGIC MONITRNG Bilateral 08/08/2017    Procedure: BILATERAL SPLANCHNIC NERVE BLOCK T12;  Surgeon: Ramiro Chinchilla MD;  [facility];  Service: Pain Management     NE INJX ANES CELIAC PLEXUS W/WO RADIOLOGIC MONITRNG Bilateral 04/18/2019    Procedure: BLOCK / INJECTION CELIAC PLEXUS;  Surgeon: Ramiro Chinchilla MD;  [facility];  Service: Pain Management     NE INJX ANES CELIAC PLEXUS W/WO RADIOLOGIC MONITRNG Bilateral 08/20/2019    Procedure: SPLANCHNIC NERVE BLOCK;  Surgeon: Ramiro Chinchilla MD;  [facility];  Service: Pain Management     NE INJX ANES CELIAC PLEXUS W/WO RADIOLOGIC MONITRNG Bilateral 10/17/2019    Procedure: SPLANCHNIC NERVE BLOCK;  Surgeon: Ramiro Chinchilla MD;  [facility];  Service: Pain Management     NE  LAPAROSCOPY SURG CHOLECYSTECTOMY N/A 2017    Procedure: LAPAROSCOPIC CHOLECYSTECTOMY, IOC, POSSIBLE OPEN.;  Surgeon: Suresh Lang MD;  [facility];  Service: General    WI SURGICAL ARTHROSCOPY SHOULDER W/ROTATOR CUFF RPR Left 2023    Procedure: Left Shoulder Arthroscopic Rotator Cuff Repair, Open Subpectoral Biceps Tenodesis, Acromioplasty, Extensive Debridement;  Surgeon: Toro Healy DO;  [facility];  Service: Orthopedics    ROTATOR CUFF REPAIR Right     SHOULDER ARTHROSCOPY      SHOULDER ARTHROSCOPY Right     SHOULDER SURGERY      TENDON REPAIR      Right shoulder      Family History   Problem Relation Age of Onset    Cirrhosis Mother     Heart disease Other         cardiac disorder    Cancer Other       Social History     Tobacco Use    Smoking status: Former     Current packs/day: 0.00     Average packs/day: 1 pack/day for 20.0 years (20.0 ttl pk-yrs)     Types: Cigarettes     Start date: 3/15/2001     Quit date: 3/15/2021     Years since quittin.0    Smokeless tobacco: Never   Vaping Use    Vaping status: Some Days    Substances: Nicotine   Substance Use Topics    Alcohol use: Not Currently     Alcohol/week: 1.0 standard drink of alcohol     Types: 1 Cans of beer per week     Comment: on weekends    Drug use: No      E-Cigarette/Vaping    E-Cigarette Use Current Some Day User       E-Cigarette/Vaping Substances    Nicotine Yes     THC No     CBD No     Flavoring No     Other No     Unknown No       I have reviewed and agree with the history as documented.     HPI    50 yom with hx chronic pancreatitis, alcoholic cirrhosis, esophageal varices, GERD, gastritis, DM, presenting to the ED via EMS for suspected alcohol intoxication. Per EMS, patient was found by his wife sitting on the floor in their home crying hysterically with suspected alcohol intoxication. No SI or HI voiced per report. Unknown trauma, though patient states he did not fall. Patient states that he  drank 3 shots at a bar today, then drove home. He states he did not fall. Has known hx alcohol use disorder, but states he has never experienced withdrawal symptoms. Denies coingestions. Wife arrived in the ED and states that she believes he was drunk several days ago as well. Patient states he was not drunk a few days ago, but after having multiple arguments with his wife about his sobriety, decided to drink today. Patient currently denies any headache, neck pain, back pain, chest pain, shortness of breath, hemoptysis, abdominal pain, vomiting, bloody stools, urinary complaints.    Review of Systems   All other systems reviewed and are negative.          Objective       ED Triage Vitals [03/21/25 1626]   Temperature Pulse Blood Pressure Respirations SpO2 Patient Position - Orthostatic VS   98.2 °F (36.8 °C) 92 141/66 18 97 % Sitting      Temp Source Heart Rate Source BP Location FiO2 (%) Pain Score    Oral Monitor Right arm -- --      Vitals      Date and Time Temp Pulse SpO2 Resp BP Pain Score FACES Pain Rating User   03/21/25 1847 -- 85 98 % 18 143/81 -- -- BS   03/21/25 1626 98.2 °F (36.8 °C) 92 97 % 18 141/66 -- -- FS            Physical Exam  Vitals and nursing note reviewed.   Constitutional:       General: He is not in acute distress.     Appearance: Normal appearance. He is well-developed and normal weight. He is not ill-appearing, toxic-appearing or diaphoretic.      Comments: Clear speech, steady gait, alcohol noted on breath     HENT:      Head: Normocephalic and atraumatic.      Right Ear: External ear normal.      Left Ear: External ear normal.      Nose: Nose normal.      Mouth/Throat:      Mouth: Mucous membranes are moist.      Pharynx: Oropharynx is clear.   Eyes:      General: No scleral icterus.     Extraocular Movements: Extraocular movements intact.      Conjunctiva/sclera: Conjunctivae normal.      Pupils: Pupils are equal, round, and reactive to light.   Cardiovascular:      Rate and Rhythm:  Normal rate and regular rhythm.      Pulses: Normal pulses.      Heart sounds: Normal heart sounds. No murmur heard.  Pulmonary:      Effort: Pulmonary effort is normal. No respiratory distress.      Breath sounds: Normal breath sounds. No wheezing, rhonchi or rales.   Abdominal:      General: Abdomen is flat.      Palpations: Abdomen is soft.      Tenderness: There is no abdominal tenderness. There is no guarding or rebound.   Musculoskeletal:         General: No swelling, tenderness, deformity or signs of injury. Normal range of motion.      Cervical back: Normal range of motion and neck supple. No tenderness.      Right lower leg: No edema.      Left lower leg: No edema.   Skin:     General: Skin is warm and dry.      Capillary Refill: Capillary refill takes less than 2 seconds.      Findings: No bruising or erythema.   Neurological:      General: No focal deficit present.      Mental Status: He is alert and oriented to person, place, and time.      Cranial Nerves: No cranial nerve deficit.      Sensory: No sensory deficit.      Motor: No weakness.      Coordination: Coordination normal.      Gait: Gait normal.      Comments: Answering questions without difficulty     Psychiatric:         Mood and Affect: Mood normal.         Behavior: Behavior normal.         Thought Content: Thought content normal.         Results Reviewed       Procedure Component Value Units Date/Time    Ammonia [[id number]]  (Normal) Collected: 03/21/25 1835    Lab Status: Final result Specimen: Blood from Arm, Right Updated: 03/21/25 1901     Ammonia 28 umol/L     Protime-INR [[id number]]  (Abnormal) Collected: 03/21/25 1835    Lab Status: Final result Specimen: Blood from Arm, Right Updated: 03/21/25 1855     Protime 16.6 seconds      INR 1.27    Narrative:      INR Therapeutic Range    Indication                                             INR Range      Atrial Fibrillation                                                2.0-3.0  Hypercoagulable State                                    2.0.2.3  Left Ventricular Asist Device                            2.0-3.0  Mechanical Heart Valve                                  -    Aortic(with afib, MI, embolism, HF, LA enlargement,    and/or coagulopathy)                                     2.0-3.0 (2.5-3.5)     Mitral                                                             2.5-3.5  Prosthetic/Bioprosthetic Heart Valve               2.0-3.0  Venous thromboembolism (VTE: VT, PE        2.0-3.0    HS Troponin 0hr (reflex protocol) [[id number]]  (Normal) Collected: 03/21/25 1648    Lab Status: Final result Specimen: Blood from Arm, Right Updated: 03/21/25 1719     hs TnI 0hr <2 ng/L     Comprehensive metabolic panel [[id number]]  (Abnormal) Collected: 03/21/25 1648    Lab Status: Final result Specimen: Blood from Arm, Right Updated: 03/21/25 1718     Sodium 143 mmol/L      Potassium 3.8 mmol/L      Chloride 106 mmol/L      CO2 27 mmol/L      ANION GAP 10 mmol/L      BUN 10 mg/dL      Creatinine 0.61 mg/dL      Glucose 123 mg/dL      Calcium 9.5 mg/dL      AST 39 U/L      ALT 21 U/L      Alkaline Phosphatase 103 U/L      Total Protein 7.6 g/dL      Albumin 4.3 g/dL      Total Bilirubin 1.05 mg/dL      eGFR 114 ml/min/1.73sq m     Narrative:      [employer] guidelines for Chronic Kidney Disease (CKD):     Stage 1 with normal or high GFR (GFR > 90 mL/min/1.73 square meters)    Stage 2 Mild CKD (GFR = 60-89 mL/min/1.73 square meters)    Stage 3A Moderate CKD (GFR = 45-59 mL/min/1.73 square meters)    Stage 3B Moderate CKD (GFR = 30-44 mL/min/1.73 square meters)    Stage 4 Severe CKD (GFR = 15-29 mL/min/1.73 square meters)    Stage 5 End Stage CKD (GFR <15 mL/min/1.73 square meters)  Note: GFR calculation is accurate only with a steady state creatinine    Magnesium [[id number]]  (Normal) Collected: 03/21/25 1648    Lab Status: Final result Specimen: Blood from Arm, Right  Updated: 03/21/25 1718     Magnesium 2.0 mg/dL     Lipase [[id number]]  (Normal) Collected: 03/21/25 1648    Lab Status: Final result Specimen: Blood from Arm, Right Updated: 03/21/25 1718     Lipase 22 u/L     CK [[id number]]  (Normal) Collected: 03/21/25 1648    Lab Status: Final result Specimen: Blood from Arm, Right Updated: 03/21/25 1718     Total CK 71 U/L     Salicylate level [[id number]]  (Normal) Collected: 03/21/25 1648    Lab Status: Final result Specimen: Blood from Arm, Right Updated: 03/21/25 1718     Salicylate Lvl <5 mg/dL     Acetaminophen level-If concentration is detectable, please discuss with medical  on call. [[id number]]  (Abnormal) Collected: 03/21/25 1648    Lab Status: Final result Specimen: Blood from Arm, Right Updated: 03/21/25 1718     Acetaminophen Level <2 ug/mL     Ethanol [[id number]]  (Abnormal) Collected: 03/21/25 1648    Lab Status: Final result Specimen: Blood from Arm, Right Updated: 03/21/25 1715     Ethanol Lvl 362 mg/dL     Fingerstick Glucose (POCT) [[id number]]  (Normal) Collected: 03/21/25 1713    Lab Status: Final result Specimen: Blood Updated: 03/21/25 1714     POC Glucose 114 mg/dl     CBC and differential [[id number]]  (Abnormal) Collected: 03/21/25 1648    Lab Status: Final result Specimen: Blood from Arm, Right Updated: 03/21/25 1657     WBC 5.25 Thousand/uL      RBC 4.25 Million/uL      Hemoglobin 10.4 g/dL      Hematocrit 34.8 %      MCV 82 fL      MCH 24.5 pg      MCHC 29.9 g/dL      RDW 17.6 %      MPV 9.5 fL      Platelets 108 Thousands/uL      nRBC 0 /100 WBCs      Segmented % 51 %      Immature Grans % 1 %      Lymphocytes % 35 %      Monocytes % 10 %      Eosinophils Relative 2 %      Basophils Relative 1 %      Absolute Neutrophils 2.71 Thousands/µL      Absolute Immature Grans 0.03 Thousand/uL      Absolute Lymphocytes 1.84 Thousands/µL      Absolute Monocytes 0.54 Thousand/µL      Eosinophils Absolute 0.09 Thousand/µL      Basophils Absolute 0.04  Thousands/µL             CT head without contrast   Final Interpretation by Heriberto Barrett MD (03/21 1904)      No acute intracranial abnormality.                  Workstation performed: [id number]         CT cervical spine without contrast   Final Interpretation by Heriberto Barrett MD (03/21 1907)      No cervical spine fracture or traumatic malalignment.                  Workstation performed: [id number]         CT chest abdomen pelvis w contrast   Final Interpretation by Heriberto Barrett MD (03/21 1917)      No findings of acute traumatic injury in the chest, abdomen or pelvis.   Stable findings of cirrhosis with hepatosplenomegaly, recanalization of the umbilical vein, and prominent varices. There is no ascites.            Workstation performed: [id number]         XR chest 1 view portable   ED Interpretation by Juanjo Garcia DO (03/21 1647)   No acute cardiopulmonary disease as interpreted by me        Final Interpretation by Nathen Balderas MD (03/21 1704)      No acute cardiopulmonary disease.            Workstation performed: [id number]             Procedures    ED Medication and Procedure Management   Prior to Admission Medications   Prescriptions Last Dose Informant Patient Reported? Taking?   Choline Fenofibrate (Fenofibric Acid) 135 MG CPDR   No No   Sig: TAKE ONE CAPSULE BY MOUTH DAILY   Empagliflozin 10 MG TABS  Self, Spouse/Significant Other Yes No   Sig: Take 10 mg by mouth every morning   Insulin Pen Needle (Pen Needles) 32G X 5 MM MISC  Self, Spouse/Significant Other Yes No   Sig: use once daily as directed   Multiple Vitamins-Minerals (MULTIVITAMIN ADULTS PO)  Self, Spouse/Significant Other Yes No   Sig: Take by mouth daily after breakfast   NON FORMULARY  Self, Spouse/Significant Other Yes No   Sig: Pantessin 1 capsule daily   Patient not taking: Reported on 1/30/2025   Pancrelipase, Lip-Prot-Amyl, (Creon) 87218-781753 units CPEP  Self, Spouse/Significant Other No  No   Sig: TAKE 1 CAPSULE (36,000 UNITS TOTAL) BY MOUTH 3 (THREE) TIMES A DAY BEFORE MEALS   TURMERIC PO  Self, Spouse/Significant Other Yes No   Sig: Take by mouth in the morning   Patient not taking: Reported on 2025   Thiamine HCl (vitamin B-1) 250 MG tablet  Self, Spouse/Significant Other Yes No   Sig: Take 250 mg by mouth daily   albuterol (PROVENTIL HFA,VENTOLIN HFA) 90 mcg/act inhaler   No No   Sig: INHALE TWO PUFFS BY MOUTH EVERY 6 HOURS AS NEEDED FOR WHEEZING   amLODIPine (NORVASC) 5 mg tablet  Self, Spouse/Significant Other No No   Sig: Take 1 tablet (5 mg total) by mouth daily   busPIRone (BUSPAR) 5 mg tablet  Self, Spouse/Significant Other No No   Sig: Take 1 tablet (5 mg total) by mouth 2 (two) times a day   carvedilol (COREG) 3.125 mg tablet   No No   Sig: Take 2 tablets (6.25 mg total) by mouth 2 (two) times a day with meals   esomeprazole (NexIUM) 40 MG capsule   No No   Sig: Take 1 capsule (40 mg total) by mouth if needed (heartburn)   famotidine (PEPCID) 40 MG tablet  Self, Spouse/Significant Other Yes No   Sig: Take 40 mg by mouth daily at bedtime as needed   fluticasone (FLONASE) 50 mcg/act nasal spray  Self, Spouse/Significant Other No No   Si spray into each nostril daily   Patient not taking: Reported on 2025   insulin degludec (Tresiba FlexTouch) 100 units/mL injection pen  Self, Spouse/Significant Other Yes No   Si units subcut in hs   lactulose (CHRONULAC) 10 g/15 mL solution   No No   Sig: Take 45 mL (30 g total) by mouth 2 (two) times a day Titrate dose to have 2-3 bowel movements daily. Take an additional dose if you are not having 2-3 bowel movements daily.   lisinopril (ZESTRIL) 20 mg tablet  Self, Spouse/Significant Other No No   Sig: TAKE ONE TABLET BY MOUTH EVERY DAY   Patient not taking: Reported on 3/11/2025   mirtazapine (REMERON) 7.5 MG tablet   No No   Sig: Take 1 tablet (7.5 mg total) by mouth daily at bedtime   omega-3-acid ethyl esters (LOVAZA) 1 g capsule   Self, Spouse/Significant Other No No   Sig: TAKE TWO CAPSULES BY MOUTH TWICE DAILY   Patient not taking: Reported on 8/19/2024   ondansetron (ZOFRAN-ODT) 4 mg disintegrating tablet  Self, Spouse/Significant Other No No   Sig: Take 1 tablet (4 mg total) by mouth every 8 (eight) hours as needed for nausea or vomiting   oxyCODONE (Roxicodone) 5 immediate release tablet   No No   Sig: Take 1 tablet (5 mg total) by mouth every 6 (six) hours as needed for moderate pain for up to 12 doses Max Daily Amount: 20 mg   rifaximin (XIFAXAN) 550 mg tablet   No No   Sig: Take 1 tablet (550 mg total) by mouth every 12 (twelve) hours   rosuvastatin (CRESTOR) 40 MG tablet  Self, Spouse/Significant Other No No   Sig: TAKE ONE TABLET BY MOUTH EVERY DAY      Facility-Administered Medications: None     Discharge Medication List as of 3/21/2025  7:34 PM        CONTINUE these medications which have NOT CHANGED    Details   albuterol (PROVENTIL HFA,VENTOLIN HFA) 90 mcg/act inhaler INHALE TWO PUFFS BY MOUTH EVERY 6 HOURS AS NEEDED FOR WHEEZING, Starting Mon 2/10/2025, Normal      amLODIPine (NORVASC) 5 mg tablet Take 1 tablet (5 mg total) by mouth daily, Starting Wed 6/26/2024, Normal      busPIRone (BUSPAR) 5 mg tablet Take 1 tablet (5 mg total) by mouth 2 (two) times a day, Starting Wed 12/4/2024, Normal      carvedilol (COREG) 3.125 mg tablet Take 2 tablets (6.25 mg total) by mouth 2 (two) times a day with meals, Starting Mon 2/10/2025, Until Sat 8/9/2025, Normal      Choline Fenofibrate (Fenofibric Acid) 135 MG CPDR TAKE ONE CAPSULE BY MOUTH DAILY, Starting Fri 2/28/2025, Normal      Empagliflozin 10 MG TABS Take 10 mg by mouth every morning, Historical Med      esomeprazole (NexIUM) 40 MG capsule Take 1 capsule (40 mg total) by mouth if needed (heartburn), Starting Mon 2/10/2025, Normal      famotidine (PEPCID) 40 MG tablet Take 40 mg by mouth daily at bedtime as needed, Historical Med      fluticasone (FLONASE) 50 mcg/act nasal spray 1  spray into each nostril daily, Starting Mon 2/5/2024, Normal      insulin degludec (Tresiba FlexTouch) 100 units/mL injection pen 20 units subcut in hs, Historical Med      Insulin Pen Needle (Pen Needles) 32G X 5 MM MISC use once daily as directed, Historical Med      lactulose (CHRONULAC) 10 g/15 mL solution Take 45 mL (30 g total) by mouth 2 (two) times a day Titrate dose to have 2-3 bowel movements daily. Take an additional dose if you are not having 2-3 bowel movements daily., Starting Thu 2/13/2025, Normal      lisinopril (ZESTRIL) 20 mg tablet TAKE ONE TABLET BY MOUTH EVERY DAY, Starting Sun 8/11/2024, Normal      mirtazapine (REMERON) 7.5 MG tablet Take 1 tablet (7.5 mg total) by mouth daily at bedtime, Starting Mon 2/3/2025, Normal      Multiple Vitamins-Minerals (MULTIVITAMIN ADULTS PO) Take by mouth daily after breakfast, Historical Med      NON FORMULARY Pantessin 1 capsule daily, Historical Med      omega-3-acid ethyl esters (LOVAZA) 1 g capsule TAKE TWO CAPSULES BY MOUTH TWICE DAILY, Normal      ondansetron (ZOFRAN-ODT) 4 mg disintegrating tablet Take 1 tablet (4 mg total) by mouth every 8 (eight) hours as needed for nausea or vomiting, Starting Wed 10/30/2024, Normal      oxyCODONE (Roxicodone) 5 immediate release tablet Take 1 tablet (5 mg total) by mouth every 6 (six) hours as needed for moderate pain for up to 12 doses Max Daily Amount: 20 mg, Starting Mon 2/10/2025, Normal      Pancrelipase, Lip-Prot-Amyl, (Creon) 92597-149555 units CPEP TAKE 1 CAPSULE (36,000 UNITS TOTAL) BY MOUTH 3 (THREE) TIMES A DAY BEFORE MEALS, Starting Tue 9/10/2024, Normal      rifaximin (XIFAXAN) 550 mg tablet Take 1 tablet (550 mg total) by mouth every 12 (twelve) hours, Starting Tue 3/11/2025, Until Mon 6/9/2025, Normal      rosuvastatin (CRESTOR) 40 MG tablet TAKE ONE TABLET BY MOUTH EVERY DAY, Starting Fri 7/5/2024, Normal      Thiamine HCl (vitamin B-1) 250 MG tablet Take 250 mg by mouth daily, Starting Wed  1/22/2025, Until Fri 2/21/2025, Historical Med      TURMERIC PO Take by mouth in the morning, Historical Med           No discharge procedures on file.  ED SEPSIS DOCUMENTATION   Time reflects when diagnosis was documented in both MDM as applicable and the Disposition within this note       Time User Action Codes Description Comment    3/21/2025  7:33 PM Juanjo Garcia [F10.920] Alcoholic intoxication without complication (HCC)     3/21/2025  7:33 PM Juanjo Garcia [K70.30] Alcoholic cirrhosis (HCC)     3/21/2025  7:33 PM Juanjo Garcia [D64.9] Chronic anemia                  Juanjo Garcia DO  03/23/25 1705

## 2025-03-24 LAB
ATRIAL RATE: 86 BPM
P AXIS: 45 DEGREES
PR INTERVAL: 140 MS
QRS AXIS: 53 DEGREES
QRSD INTERVAL: 118 MS
QT INTERVAL: 408 MS
QTC INTERVAL: 488 MS
T WAVE AXIS: 18 DEGREES
VENTRICULAR RATE: 86 BPM

## 2025-03-24 PROCEDURE — 93010 ELECTROCARDIOGRAM REPORT: CPT | Performed by: INTERNAL MEDICINE

## 2025-03-26 DIAGNOSIS — E78.2 MIXED HYPERLIPIDEMIA: ICD-10-CM

## 2025-03-26 DIAGNOSIS — E78.1 HYPERTRIGLYCERIDEMIA: ICD-10-CM

## 2025-03-27 RX ORDER — FENOFIBRIC ACID 135 MG/1
1 CAPSULE, DELAYED RELEASE ORAL DAILY
Qty: 30 CAPSULE | Refills: 5 | Status: SHIPPED | OUTPATIENT
Start: 2025-03-27

## 2025-04-08 ENCOUNTER — RESULTS FOLLOW-UP (OUTPATIENT)
Dept: GASTROENTEROLOGY | Facility: CLINIC | Age: 50
End: 2025-04-08

## 2025-04-08 ENCOUNTER — APPOINTMENT (OUTPATIENT)
Dept: LAB | Facility: HOSPITAL | Age: 50
End: 2025-04-08
Payer: COMMERCIAL

## 2025-04-08 ENCOUNTER — TELEPHONE (OUTPATIENT)
Age: 50
End: 2025-04-08

## 2025-04-08 DIAGNOSIS — R53.83 OTHER FATIGUE: ICD-10-CM

## 2025-04-08 DIAGNOSIS — R41.3 MEMORY DIFFICULTIES: ICD-10-CM

## 2025-04-08 DIAGNOSIS — I10 PRIMARY HYPERTENSION: ICD-10-CM

## 2025-04-08 DIAGNOSIS — E87.6 HYPOKALEMIA: ICD-10-CM

## 2025-04-08 DIAGNOSIS — E11.65 CONTROLLED TYPE 2 DIABETES MELLITUS WITH HYPERGLYCEMIA, WITH LONG-TERM CURRENT USE OF INSULIN (HCC): ICD-10-CM

## 2025-04-08 DIAGNOSIS — Z79.4 CONTROLLED TYPE 2 DIABETES MELLITUS WITH HYPERGLYCEMIA, WITH LONG-TERM CURRENT USE OF INSULIN (HCC): ICD-10-CM

## 2025-04-08 DIAGNOSIS — I85.11 SECONDARY ESOPHAGEAL VARICES WITH BLEEDING (HCC): ICD-10-CM

## 2025-04-08 DIAGNOSIS — K74.60 CIRRHOSIS OF LIVER WITHOUT ASCITES, UNSPECIFIED HEPATIC CIRRHOSIS TYPE (HCC): ICD-10-CM

## 2025-04-08 DIAGNOSIS — K92.2 GASTROINTESTINAL HEMORRHAGE, UNSPECIFIED GASTROINTESTINAL HEMORRHAGE TYPE: ICD-10-CM

## 2025-04-08 DIAGNOSIS — K76.82 HEPATIC ENCEPHALOPATHY (HCC): ICD-10-CM

## 2025-04-08 DIAGNOSIS — F10.21 ALCOHOL DEPENDENCE IN REMISSION (HCC): ICD-10-CM

## 2025-04-08 DIAGNOSIS — K70.11 ALCOHOLIC HEPATITIS WITH ASCITES: ICD-10-CM

## 2025-04-08 DIAGNOSIS — I10 ESSENTIAL HYPERTENSION: ICD-10-CM

## 2025-04-08 DIAGNOSIS — K86.1 CHRONIC PANCREATITIS, UNSPECIFIED PANCREATITIS TYPE (HCC): ICD-10-CM

## 2025-04-08 DIAGNOSIS — G47.00 INSOMNIA, UNSPECIFIED TYPE: ICD-10-CM

## 2025-04-08 LAB
ALBUMIN SERPL BCG-MCNC: 4.1 G/DL (ref 3.5–5)
ALP SERPL-CCNC: 112 U/L (ref 34–104)
ALT SERPL W P-5'-P-CCNC: 13 U/L (ref 7–52)
AMMONIA PLAS-SCNC: 35 UMOL/L (ref 18–72)
ANION GAP SERPL CALCULATED.3IONS-SCNC: 7 MMOL/L (ref 4–13)
AST SERPL W P-5'-P-CCNC: 25 U/L (ref 13–39)
BILIRUB SERPL-MCNC: 1.14 MG/DL (ref 0.2–1)
BUN SERPL-MCNC: 10 MG/DL (ref 5–25)
CALCIUM SERPL-MCNC: 9 MG/DL (ref 8.4–10.2)
CHLORIDE SERPL-SCNC: 103 MMOL/L (ref 96–108)
CO2 SERPL-SCNC: 25 MMOL/L (ref 21–32)
CREAT SERPL-MCNC: 0.55 MG/DL (ref 0.6–1.3)
ERYTHROCYTE [DISTWIDTH] IN BLOOD BY AUTOMATED COUNT: 18.3 % (ref 11.6–15.1)
GFR SERPL CREATININE-BSD FRML MDRD: 119 ML/MIN/1.73SQ M
GLUCOSE P FAST SERPL-MCNC: 90 MG/DL (ref 65–99)
HCT VFR BLD AUTO: 34.5 % (ref 36.5–49.3)
HGB BLD-MCNC: 10.1 G/DL (ref 12–17)
INR PPP: 1.2 (ref 0.85–1.19)
MCH RBC QN AUTO: 23.1 PG (ref 26.8–34.3)
MCHC RBC AUTO-ENTMCNC: 29.3 G/DL (ref 31.4–37.4)
MCV RBC AUTO: 79 FL (ref 82–98)
PLATELET # BLD AUTO: 103 THOUSANDS/UL (ref 149–390)
PMV BLD AUTO: 10.6 FL (ref 8.9–12.7)
POTASSIUM SERPL-SCNC: 3.7 MMOL/L (ref 3.5–5.3)
PROT SERPL-MCNC: 7.6 G/DL (ref 6.4–8.4)
PROTHROMBIN TIME: 16 SECONDS (ref 12.3–15)
RBC # BLD AUTO: 4.38 MILLION/UL (ref 3.88–5.62)
SODIUM SERPL-SCNC: 135 MMOL/L (ref 135–147)
WBC # BLD AUTO: 3.29 THOUSAND/UL (ref 4.31–10.16)

## 2025-04-08 PROCEDURE — 82140 ASSAY OF AMMONIA: CPT

## 2025-04-08 PROCEDURE — 85610 PROTHROMBIN TIME: CPT

## 2025-04-08 PROCEDURE — 85027 COMPLETE CBC AUTOMATED: CPT

## 2025-04-08 PROCEDURE — 80053 COMPREHEN METABOLIC PANEL: CPT

## 2025-04-08 PROCEDURE — 36415 COLL VENOUS BLD VENIPUNCTURE: CPT

## 2025-04-08 NOTE — TELEPHONE ENCOUNTER
I wrote a letter starting he can return to work 4/16 without no restrictions so he can pick this up or it should be available in [employer] I believe.

## 2025-04-08 NOTE — TELEPHONE ENCOUNTER
Called patient and got NICK JARAMILLO that Dinora wrote work note and to please return call to office to let us know if he would like to pick it up or to mail it to him. Left office # as well

## 2025-04-08 NOTE — TELEPHONE ENCOUNTER
Pt says work note still not what job is looking for -- as per his employer it must  specifically list that he can return to work with no restrictions April 16th . They wont accept any other wording. Please assist .

## 2025-04-08 NOTE — TELEPHONE ENCOUNTER
Routing to PA    Patient returned phone ronny to office. His work note must state he can return to work on 4/16/2025 with no restrictions.    Please advise. Thank you !

## 2025-04-09 NOTE — TELEPHONE ENCOUNTER
Pt's wife calling to advise cannot see letter on [employer]. Tried resending, wife advised still not showing. [facility].

## 2025-04-11 ENCOUNTER — TELEPHONE (OUTPATIENT)
Dept: GASTROENTEROLOGY | Facility: CLINIC | Age: 50
End: 2025-04-11

## 2025-04-11 NOTE — TELEPHONE ENCOUNTER
Patient dropped off forms from [employer]...... ( did not scan into chart due to fact they are blank forms ) . Will give to richard when she is in to have her fill out. Once forms are completed will call patient a...fax them and then scan into patients chart

## 2025-04-15 ENCOUNTER — TELEPHONE (OUTPATIENT)
Dept: GASTROENTEROLOGY | Facility: CLINIC | Age: 50
End: 2025-04-15

## 2025-04-15 NOTE — TELEPHONE ENCOUNTER
Called and spoke to patient. Let patient know that Dinora filled out his back to work forms .  Also let patient know that they were faxed to [employer] @ [phone].  Patient stated he will  originals  in next day or so..... will scan into patient chart.............

## 2025-04-20 DIAGNOSIS — G47.00 INSOMNIA, UNSPECIFIED TYPE: ICD-10-CM

## 2025-04-20 RX ORDER — ONDANSETRON 4 MG/1
TABLET, ORALLY DISINTEGRATING ORAL
Qty: 30 TABLET | Refills: 0 | Status: SHIPPED | OUTPATIENT
Start: 2025-04-20

## 2025-04-21 DIAGNOSIS — G47.00 INSOMNIA, UNSPECIFIED TYPE: ICD-10-CM

## 2025-04-21 DIAGNOSIS — J45.909 UNCOMPLICATED ASTHMA, UNSPECIFIED ASTHMA SEVERITY, UNSPECIFIED WHETHER PERSISTENT: ICD-10-CM

## 2025-04-21 RX ORDER — ONDANSETRON 4 MG/1
TABLET, ORALLY DISINTEGRATING ORAL
Qty: 30 TABLET | Refills: 0 | OUTPATIENT
Start: 2025-04-21

## 2025-04-21 RX ORDER — ALBUTEROL SULFATE 90 UG/1
2 INHALANT RESPIRATORY (INHALATION) EVERY 6 HOURS PRN
Qty: 54 G | Refills: 0 | Status: SHIPPED | OUTPATIENT
Start: 2025-04-21

## 2025-04-22 ENCOUNTER — APPOINTMENT (OUTPATIENT)
Dept: RADIOLOGY | Facility: CLINIC | Age: 50
End: 2025-04-22
Attending: STUDENT IN AN ORGANIZED HEALTH CARE EDUCATION/TRAINING PROGRAM
Payer: COMMERCIAL

## 2025-04-22 ENCOUNTER — OFFICE VISIT (OUTPATIENT)
Dept: OBGYN CLINIC | Facility: CLINIC | Age: 50
End: 2025-04-22
Payer: COMMERCIAL

## 2025-04-22 VITALS — WEIGHT: 183.2 LBS | HEIGHT: 71 IN | BODY MASS INDEX: 25.65 KG/M2

## 2025-04-22 DIAGNOSIS — M25.551 RIGHT HIP PAIN: ICD-10-CM

## 2025-04-22 DIAGNOSIS — M70.61 TROCHANTERIC BURSITIS OF RIGHT HIP: Primary | ICD-10-CM

## 2025-04-22 PROCEDURE — 99213 OFFICE O/P EST LOW 20 MIN: CPT | Performed by: STUDENT IN AN ORGANIZED HEALTH CARE EDUCATION/TRAINING PROGRAM

## 2025-04-22 PROCEDURE — 20610 DRAIN/INJ JOINT/BURSA W/O US: CPT | Performed by: STUDENT IN AN ORGANIZED HEALTH CARE EDUCATION/TRAINING PROGRAM

## 2025-04-22 PROCEDURE — 73502 X-RAY EXAM HIP UNI 2-3 VIEWS: CPT

## 2025-04-22 RX ORDER — BUPIVACAINE HYDROCHLORIDE 2.5 MG/ML
3 INJECTION, SOLUTION INFILTRATION; PERINEURAL
Status: COMPLETED | OUTPATIENT
Start: 2025-04-22 | End: 2025-04-22

## 2025-04-22 RX ORDER — MIRTAZAPINE 7.5 MG/1
7.5 TABLET, FILM COATED ORAL
Qty: 30 TABLET | Refills: 0 | Status: SHIPPED | OUTPATIENT
Start: 2025-04-22

## 2025-04-22 RX ORDER — LIDOCAINE HYDROCHLORIDE 10 MG/ML
3 INJECTION, SOLUTION INFILTRATION; PERINEURAL
Status: COMPLETED | OUTPATIENT
Start: 2025-04-22 | End: 2025-04-22

## 2025-04-22 RX ADMIN — LIDOCAINE HYDROCHLORIDE 3 ML: 10 INJECTION, SOLUTION INFILTRATION; PERINEURAL at 08:30

## 2025-04-22 RX ADMIN — BUPIVACAINE HYDROCHLORIDE 3 ML: 2.5 INJECTION, SOLUTION INFILTRATION; PERINEURAL at 08:30

## 2025-04-22 NOTE — LETTER
April 22, 2025     Patient: Ashwin Wright  YOB: 1975  Date of Visit: 4/22/2025      To Whom it May Concern:    Ashwin Wright is under my professional care. Ashwin was seen in my office on 4/22/2025.  Please excuse him from any work duty missed in order to attend his visit today.  It is okay for him to return to work tomorrow April 23, 2025 and full duty capacity.    If you have any questions or concerns, please don't hesitate to call.         Sincerely,          Nilson Torres MD        CC: No Recipients

## 2025-04-22 NOTE — PROGRESS NOTES
ASSESSMENT/PLAN:    Assessment & Plan  Trochanteric bursitis of right hip  Discussed history, exam, and imaging with patient. Presentation most consistent with right trochanteric bursitis and we will plan for nonoperative management at this time.  Discussed oral/topical medication regimen. Will plan for continued use of over-the-counter medications as needed.  Likewise discussed topical diclofenac gel, which she already has.  Discussed injections as a pain adjunct.  He previously had a trochanteric bursa injection with significant relief.  This was repeated again today in clinic.  Patient tolerated this well.  Discussed rehabilitation efforts. Will plan for home exercise program.  We did discuss that rehab can be beneficial to improve his likely muscular imbalance and tightness to his IT band contributing to his symptoms.  However, this was deferred at this time.  Work note provided allowing him to return to work as a  tomorrow.     No follow-ups on file.    _____________________________________________________  CHIEF COMPLAINT:  No chief complaint on file.      SUBJECTIVE:  Ashwin Wright is a 50 y.o. year old male who presents for evaluation of right hip pain. The issue began in January without traumatic onset.  Localizes to the lateral aspect of the hip primarily however it on occasion will radiate to the medial aspect of his distal femur.  No physical therapy.  He has tried some over-the-counter medications as well as diclofenac gel.  He saw Dr. Garcia in January and he was given a trochanteric bursa injection which gave him near full relief for approximately 3 months.  He is just began to have pain again over the last week and is scheduled to start work again tomorrow as a  and wanted to attempt to do something for resolution of his pain.  Denies any numbness or tingling to the distal extremity.      PAST MEDICAL HISTORY:  Past Medical History:   Diagnosis Date    Arthritis     Asthma      Chronic pain disorder     Chronic pancreatitis (HCC)     Cirrhosis (HCC)     early cirrhosis    GERD (gastroesophageal reflux disease)     History of COVID-19 01/2022    mild s/s    Hyperlipidemia     Hypertension     Liver abscess     Liver disease     Meniscus tear     left knee work injury last assessed 08/24/2016    Pancreatitis     Pneumonia     Rotator cuff tear     Stomach disorder     Chronic gastritus       PAST SURGICAL HISTORY:  Past Surgical History:   Procedure Laterality Date    CELIAC PLEXUS BLOCK Bilateral 10/29/2018    Procedure: SPLANCHNIC NERVE BLOCK;  Surgeon: Ramiro Chinchilla MD;  [facility];  Service: Pain Management     CELIAC PLEXUS BLOCK Bilateral 01/10/2019    Procedure: SPLANCHNIC NERVE BLOCK;  Surgeon: Ramiro Chinchilla MD;  [facility];  Service: Pain Management     CHOLECYSTECTOMY      COLONOSCOPY      ESOPHAGOGASTRODUODENOSCOPY N/A 11/16/2017    Procedure: ESOPHAGOGASTRODUODENOSCOPY (EGD);  Surgeon: Ever Bonner MD;  [facility];  Service: Gastroenterology    ESOPHAGOGASTRODUODENOSCOPY N/A 04/19/2018    Procedure: ESOPHAGOGASTRODUODENOSCOPY (EGD);  Surgeon: Orestes Forrest MD;  [facility];  Service: Gastroenterology    ESOPHAGOGASTRODUODENOSCOPY N/A 05/10/2018    Procedure: ESOPHAGOGASTRODUODENOSCOPY (EGD);  Surgeon: Vaibhav Matthew MD;  [facility];  Service: Gastroenterology    ESOPHAGUS SURGERY  01/17/2025    nubia galindo tear repain    HERNIA REPAIR Bilateral 02/03/2023    Procedure: REPAIR HERNIA INGUINAL, LAPAROSCOPIC,;  Surgeon: Juanjo Travis DO;  [facility];  Service: General    IR TEMPORARY DIALYSIS CATHETER PLACEMENT  02/28/2019    KNEE ARTHROSCOPY Bilateral     KNEE ARTHROSCOPY Right 2009    cibishino last assessed 08/24/2016    KNEE ARTHROSCOPY Right 07/01/2019    LIVER SURGERY      NERVE BLOCK Bilateral 05/29/2018    Procedure: SPLANCHNIC NERVE BLOCK;  Surgeon: Ramiro Chinchilla MD;  [facility];  Service:  Pain Management     PANCREAS SURGERY      stents    PANCREATIC CYST EXCISION      OH INJECTION ANES LMBR/THRC PARAVERTBRL SYMPATHETIC Bilateral 12/28/2017    Procedure: SPLANCHNIC NERVE BLOCK;  Surgeon: Ramiro Chinchilla MD;  [facility];  Service: Pain Management     OH INJX ANES CELIAC PLEXUS W/WO RADIOLOGIC MONITRNG Bilateral 05/09/2017    Procedure: CELIAC PLEXUS BLOCK ;  Surgeon: Ramiro Chinchilla MD;  [facility];  Service: Pain Management     OH INJX ANES CELIAC PLEXUS W/WO RADIOLOGIC MONITRNG Bilateral 06/01/2017    Procedure: SPLANCHNIC NERVE BLOCK at T12;  Surgeon: Ramiro Chinchilla MD;  [facility];  Service: Pain Management     OH INJX ANES CELIAC PLEXUS W/WO RADIOLOGIC MONITRNG Bilateral 08/08/2017    Procedure: BILATERAL SPLANCHNIC NERVE BLOCK T12;  Surgeon: Ramiro Chinchilla MD;  [facility];  Service: Pain Management     OH INJX ANES CELIAC PLEXUS W/WO RADIOLOGIC MONITRNG Bilateral 04/18/2019    Procedure: BLOCK / INJECTION CELIAC PLEXUS;  Surgeon: Ramiro Chinchilla MD;  [facility];  Service: Pain Management     OH INJX ANES CELIAC PLEXUS W/WO RADIOLOGIC MONITRNG Bilateral 08/20/2019    Procedure: SPLANCHNIC NERVE BLOCK;  Surgeon: Ramiro Chinchilla MD;  [facility];  Service: Pain Management     OH INJX ANES CELIAC PLEXUS W/WO RADIOLOGIC MONITRNG Bilateral 10/17/2019    Procedure: SPLANCHNIC NERVE BLOCK;  Surgeon: Ramiro Chinchilla MD;  [facility];  Service: Pain Management     OH LAPAROSCOPY SURG CHOLECYSTECTOMY N/A 02/14/2017    Procedure: LAPAROSCOPIC CHOLECYSTECTOMY, IOC, POSSIBLE OPEN.;  Surgeon: Suresh Lang MD;  [facility];  Service: General    OH SURGICAL ARTHROSCOPY SHOULDER W/ROTATOR CUFF RPR Left 06/09/2023    Procedure: Left Shoulder Arthroscopic Rotator Cuff Repair, Open Subpectoral Biceps Tenodesis, Acromioplasty, Extensive Debridement;  Surgeon: Toro Healy DO;  [facility];  Service: Orthopedics    ROTATOR CUFF  REPAIR Right     SHOULDER ARTHROSCOPY      SHOULDER ARTHROSCOPY Right     SHOULDER SURGERY      TENDON REPAIR  2015    Right shoulder       FAMILY HISTORY:  Family History   Problem Relation Age of Onset    Cirrhosis Mother     Heart disease Other         cardiac disorder    Cancer Other        SOCIAL HISTORY:  Social History     Tobacco Use    Smoking status: Former     Current packs/day: 0.00     Average packs/day: 1 pack/day for 20.0 years (20.0 ttl pk-yrs)     Types: Cigarettes     Start date: 3/15/2001     Quit date: 3/15/2021     Years since quittin.1    Smokeless tobacco: Never   Vaping Use    Vaping status: Some Days    Substances: Nicotine   Substance Use Topics    Alcohol use: Not Currently     Alcohol/week: 1.0 standard drink of alcohol     Types: 1 Cans of beer per week     Comment: on weekends    Drug use: No       MEDICATIONS:    Current Outpatient Medications:     albuterol (PROVENTIL HFA,VENTOLIN HFA) 90 mcg/act inhaler, Inhale 2 puffs every 6 (six) hours as needed for wheezing, Disp: 54 g, Rfl: 0    amLODIPine (NORVASC) 5 mg tablet, Take 1 tablet (5 mg total) by mouth daily, Disp: 90 tablet, Rfl: 1    busPIRone (BUSPAR) 5 mg tablet, Take 1 tablet (5 mg total) by mouth 2 (two) times a day, Disp: 60 tablet, Rfl: 5    carvedilol (COREG) 3.125 mg tablet, Take 2 tablets (6.25 mg total) by mouth 2 (two) times a day with meals, Disp: 60 tablet, Rfl: 6    Choline Fenofibrate (Fenofibric Acid) 135 MG CPDR, TAKE ONE CAPSULE BY MOUTH DAILY, Disp: 30 capsule, Rfl: 5    Empagliflozin 10 MG TABS, Take 10 mg by mouth every morning, Disp: , Rfl:     esomeprazole (NexIUM) 40 MG capsule, Take 1 capsule (40 mg total) by mouth if needed (heartburn), Disp: 30 capsule, Rfl: 6    famotidine (PEPCID) 40 MG tablet, Take 40 mg by mouth daily at bedtime as needed, Disp: , Rfl:     insulin degludec (Tresiba FlexTouch) 100 units/mL injection pen, 20 units subcut in hs, Disp: , Rfl:     Insulin Pen Needle (Pen Needles) 32G X  5 MM MISC, use once daily as directed, Disp: , Rfl:     lactulose (CHRONULAC) 10 g/15 mL solution, Take 45 mL (30 g total) by mouth 2 (two) times a day Titrate dose to have 2-3 bowel movements daily. Take an additional dose if you are not having 2-3 bowel movements daily., Disp: 2700 mL, Rfl: 2    mirtazapine (REMERON) 7.5 MG tablet, Take 1 tablet (7.5 mg total) by mouth daily at bedtime, Disp: 30 tablet, Rfl: 0    Multiple Vitamins-Minerals (MULTIVITAMIN ADULTS PO), Take by mouth daily after breakfast, Disp: , Rfl:     ondansetron (ZOFRAN-ODT) 4 mg disintegrating tablet, DISSOLVE ONE TABLET IN MOUTH EVERY EIGHT HOURS AS NEEDED FOR NAUSEA AND VOMITING, Disp: 30 tablet, Rfl: 0    oxyCODONE (Roxicodone) 5 immediate release tablet, Take 1 tablet (5 mg total) by mouth every 6 (six) hours as needed for moderate pain for up to 12 doses Max Daily Amount: 20 mg, Disp: 20 tablet, Rfl: 0    Pancrelipase, Lip-Prot-Amyl, (Creon) 71792-231441 units CPEP, TAKE 1 CAPSULE (36,000 UNITS TOTAL) BY MOUTH 3 (THREE) TIMES A DAY BEFORE MEALS, Disp: 270 capsule, Rfl: 1    rifaximin (XIFAXAN) 550 mg tablet, Take 1 tablet (550 mg total) by mouth every 12 (twelve) hours, Disp: 180 tablet, Rfl: 2    rosuvastatin (CRESTOR) 40 MG tablet, TAKE ONE TABLET BY MOUTH EVERY DAY, Disp: 90 tablet, Rfl: 1    fluticasone (FLONASE) 50 mcg/act nasal spray, 1 spray into each nostril daily (Patient not taking: Reported on 1/30/2025), Disp: 9 mL, Rfl: 1    lisinopril (ZESTRIL) 20 mg tablet, TAKE ONE TABLET BY MOUTH EVERY DAY (Patient not taking: Reported on 3/11/2025), Disp: 90 tablet, Rfl: 1    NON FORMULARY, Pantessin 1 capsule daily (Patient not taking: Reported on 1/30/2025), Disp: , Rfl:     omega-3-acid ethyl esters (LOVAZA) 1 g capsule, TAKE TWO CAPSULES BY MOUTH TWICE DAILY (Patient not taking: Reported on 8/19/2024), Disp: 360 capsule, Rfl: 0    Thiamine HCl (vitamin B-1) 250 MG tablet, Take 250 mg by mouth daily, Disp: , Rfl:     TURMERIC PO, Take  "by mouth in the morning (Patient not taking: Reported on 1/30/2025), Disp: , Rfl:     ALLERGIES:  Allergies   Allergen Reactions    Bee Venom Swelling    Percocet [Oxycodone-Acetaminophen] GI Bleeding, Vomiting and Abdominal Pain       Review of systems:   Constitutional: Negative for fatigue, fever or loss of apetite.   HENT: Negative.    Respiratory: Negative for shortness of breath, dyspnea.    Cardiovascular: Negative for chest pain/tightness.   Gastrointestinal: Negative for abdominal pain, N/V.   Endocrine: Negative for cold/heat intolerance, unexplained weight loss/gain.   Genitourinary: Negative for flank pain, dysuria, hematuria.   Musculoskeletal: As in HPI   Skin: Negative for rash.    Neurological: Negative for numbness tingling  Psychiatric/Behavioral: Negative for agitation.  _____________________________________________________  PHYSICAL EXAMINATION:    Height 5' 11\" (1.803 m), weight 83.1 kg (183 lb 3.2 oz).    General: well developed and well nourished, alert, oriented times 3, and appears comfortable  HEENT: Benign, normocephalic, atraumatic  Cardiovascular: regular rate    Pulmonary: No wheezing or stridor  Abdomen: Soft, Nontender  Skin: No masses, erythema, lacerations, fluctation, ulcerations  Neurovascular: as per MSK exam below    MUSCULOSKELETAL EXAMINATION:    Right hip Exam  No swelling, bruising or deformity  Tender to palpation at the greater trochanter, nonTTP elsewhere about proximal LE   Passive ROM  Flexion: 95  Internal Rotation: 15  External Rotation: 30  Provocative Maneuvers  (+) DARCY  (-): Impingment  Strength testing  4/5 Iliopsoas, abductors. 5/5 quad, hamstring, TA, gastroc  SILT sa/loredo/sp/dp/t  2+ PT pulse       _____________________________________________________  STUDIES REVIEWED:  Images personally reviewed by me today;    Xrays of the right hip and pelvis taken on 4/22/2025 demonstrate x-rays pelvis and right hip reviewed showing that patient has significant femoral " acetabular impingement with large cam deformity to right hip as well as associated small ossific density adjacent to right acetabulum.  There is no evidence of fracture or dislocation.  The visualized aspect to the lumbar spine is without significant pathology.      Large joint arthrocentesis: R greater trochanteric bursa  Universal Protocol:  procedure performed by consultantConsent: Verbal consent obtained.  Consent given by: patient  Patient understanding: patient states understanding of the procedure being performed  Patient consent: the patient's understanding of the procedure matches consent given  Relevant documents: relevant documents present and verified  Patient identity confirmed: verbally with patient  Supporting Documentation  Indications: pain     Is this a Visco injection? NoProcedure Details  Location: hip - R greater trochanteric bursa  Needle size: 22 G  Ultrasound guidance: no  Medications administered: 3 mL bupivacaine 0.25 %; 3 mL lidocaine 1 %; 40 mg triamcinolone acetonide 10 mg/mL    Patient tolerance: patient tolerated the procedure well with no immediate complications  Dressing:  Sterile dressing applied      3, 3, 1     Scribe Attestation      I,:   am acting as a scribe while in the presence of the attending physician.:       I,:   personally performed the services described in this documentation    as scribed in my presence.:

## 2025-04-25 ENCOUNTER — HOSPITAL ENCOUNTER (EMERGENCY)
Facility: HOSPITAL | Age: 50
Discharge: HOME | End: 2025-04-25
Attending: EMERGENCY MEDICINE
Payer: COMMERCIAL

## 2025-04-25 VITALS
DIASTOLIC BLOOD PRESSURE: 77 MMHG | TEMPERATURE: 98.1 F | SYSTOLIC BLOOD PRESSURE: 157 MMHG | RESPIRATION RATE: 17 BRPM | HEART RATE: 73 BPM | OXYGEN SATURATION: 96 %

## 2025-04-25 DIAGNOSIS — F10.929 ALCOHOL INTOXICATION (HCC): Primary | ICD-10-CM

## 2025-04-25 LAB
ALBUMIN SERPL BCG-MCNC: 4 G/DL (ref 3.5–5)
ALP SERPL-CCNC: 129 U/L (ref 34–104)
ALT SERPL W P-5'-P-CCNC: 23 U/L (ref 7–52)
AMPHETAMINES SERPL QL SCN: NEGATIVE
ANION GAP SERPL CALCULATED.3IONS-SCNC: 10 MMOL/L (ref 4–13)
ANISOCYTOSIS BLD QL SMEAR: PRESENT
AST SERPL W P-5'-P-CCNC: 36 U/L (ref 13–39)
ATRIAL RATE: 70 BPM
BACTERIA UR QL AUTO: NORMAL /HPF
BARBITURATES UR QL: NEGATIVE
BASOPHILS # BLD AUTO: 0.01 THOUSANDS/ÂΜL (ref 0–0.1)
BASOPHILS NFR BLD AUTO: 0 % (ref 0–1)
BENZODIAZ UR QL: NEGATIVE
BILIRUB SERPL-MCNC: 1.22 MG/DL (ref 0.2–1)
BILIRUB UR QL STRIP: NEGATIVE
BUN SERPL-MCNC: 9 MG/DL (ref 5–25)
CALCIUM SERPL-MCNC: 9.4 MG/DL (ref 8.4–10.2)
CHLORIDE SERPL-SCNC: 107 MMOL/L (ref 96–108)
CLARITY UR: CLEAR
CO2 SERPL-SCNC: 24 MMOL/L (ref 21–32)
COCAINE UR QL: NEGATIVE
COLOR UR: ABNORMAL
CREAT SERPL-MCNC: 0.44 MG/DL (ref 0.6–1.3)
EOSINOPHIL # BLD AUTO: 0 THOUSAND/ÂΜL (ref 0–0.61)
EOSINOPHIL NFR BLD AUTO: 0 % (ref 0–6)
ERYTHROCYTE [DISTWIDTH] IN BLOOD BY AUTOMATED COUNT: 20.4 % (ref 11.6–15.1)
ETHANOL EXG-MCNC: 0.97 MG/DL
FENTANYL UR QL SCN: NEGATIVE
GFR SERPL CREATININE-BSD FRML MDRD: 131 ML/MIN/1.73SQ M
GLUCOSE SERPL-MCNC: 164 MG/DL (ref 65–140)
GLUCOSE UR STRIP-MCNC: ABNORMAL MG/DL
HCT VFR BLD AUTO: 34.4 % (ref 36.5–49.3)
HGB BLD-MCNC: 10.3 G/DL (ref 12–17)
HGB UR QL STRIP.AUTO: NEGATIVE
HYDROCODONE UR QL SCN: NEGATIVE
IMM GRANULOCYTES # BLD AUTO: 0.02 THOUSAND/UL (ref 0–0.2)
IMM GRANULOCYTES NFR BLD AUTO: 0 % (ref 0–2)
KETONES UR STRIP-MCNC: NEGATIVE MG/DL
LEUKOCYTE ESTERASE UR QL STRIP: ABNORMAL
LYMPHOCYTES # BLD AUTO: 1.35 THOUSANDS/ÂΜL (ref 0.6–4.47)
LYMPHOCYTES NFR BLD AUTO: 18 % (ref 14–44)
MCH RBC QN AUTO: 23.2 PG (ref 26.8–34.3)
MCHC RBC AUTO-ENTMCNC: 29.9 G/DL (ref 31.4–37.4)
MCV RBC AUTO: 78 FL (ref 82–98)
METHADONE UR QL: NEGATIVE
MONOCYTES # BLD AUTO: 0.7 THOUSAND/ÂΜL (ref 0.17–1.22)
MONOCYTES NFR BLD AUTO: 9 % (ref 4–12)
NEUTROPHILS # BLD AUTO: 5.42 THOUSANDS/ÂΜL (ref 1.85–7.62)
NEUTS SEG NFR BLD AUTO: 73 % (ref 43–75)
NITRITE UR QL STRIP: NEGATIVE
NON-SQ EPI CELLS URNS QL MICRO: NORMAL /HPF
NRBC BLD AUTO-RTO: 0 /100 WBCS
OPIATES UR QL SCN: NEGATIVE
OXYCODONE+OXYMORPHONE UR QL SCN: NEGATIVE
P AXIS: 23 DEGREES
PCP UR QL: NEGATIVE
PH UR STRIP.AUTO: 6.5 [PH]
PLATELET # BLD AUTO: 86 THOUSANDS/UL (ref 149–390)
PLATELET BLD QL SMEAR: ABNORMAL
PMV BLD AUTO: 11 FL (ref 8.9–12.7)
POTASSIUM SERPL-SCNC: 3.5 MMOL/L (ref 3.5–5.3)
PR INTERVAL: 130 MS
PROT SERPL-MCNC: 7.3 G/DL (ref 6.4–8.4)
PROT UR STRIP-MCNC: NEGATIVE MG/DL
QRS AXIS: 42 DEGREES
QRSD INTERVAL: 112 MS
QT INTERVAL: 424 MS
QTC INTERVAL: 457 MS
RBC # BLD AUTO: 4.44 MILLION/UL (ref 3.88–5.62)
RBC #/AREA URNS AUTO: NORMAL /HPF
RBC MORPH BLD: PRESENT
SODIUM SERPL-SCNC: 141 MMOL/L (ref 135–147)
SP GR UR STRIP.AUTO: 1.04 (ref 1–1.03)
T WAVE AXIS: 34 DEGREES
THC UR QL: NEGATIVE
TSH SERPL DL<=0.05 MIU/L-ACNC: 0.61 UIU/ML (ref 0.45–4.5)
UROBILINOGEN UR STRIP-ACNC: <2 MG/DL
VENTRICULAR RATE: 70 BPM
WBC # BLD AUTO: 7.5 THOUSAND/UL (ref 4.31–10.16)
WBC #/AREA URNS AUTO: NORMAL /HPF

## 2025-04-25 PROCEDURE — 93010 ELECTROCARDIOGRAM REPORT: CPT | Performed by: INTERNAL MEDICINE

## 2025-04-25 PROCEDURE — 82075 ASSAY OF BREATH ETHANOL: CPT | Performed by: EMERGENCY MEDICINE

## 2025-04-25 PROCEDURE — 84443 ASSAY THYROID STIM HORMONE: CPT | Performed by: EMERGENCY MEDICINE

## 2025-04-25 PROCEDURE — 36415 COLL VENOUS BLD VENIPUNCTURE: CPT | Performed by: EMERGENCY MEDICINE

## 2025-04-25 PROCEDURE — 85025 COMPLETE CBC W/AUTO DIFF WBC: CPT | Performed by: EMERGENCY MEDICINE

## 2025-04-25 PROCEDURE — 80307 DRUG TEST PRSMV CHEM ANLYZR: CPT | Performed by: EMERGENCY MEDICINE

## 2025-04-25 PROCEDURE — 99284 EMERGENCY DEPT VISIT MOD MDM: CPT

## 2025-04-25 PROCEDURE — 81001 URINALYSIS AUTO W/SCOPE: CPT | Performed by: EMERGENCY MEDICINE

## 2025-04-25 PROCEDURE — 80053 COMPREHEN METABOLIC PANEL: CPT | Performed by: EMERGENCY MEDICINE

## 2025-04-25 PROCEDURE — 93005 ELECTROCARDIOGRAM TRACING: CPT

## 2025-04-25 PROCEDURE — 99285 EMERGENCY DEPT VISIT HI MDM: CPT | Performed by: EMERGENCY MEDICINE

## 2025-04-25 RX ORDER — MIRTAZAPINE 15 MG/1
7.5 TABLET, FILM COATED ORAL
Status: DISCONTINUED | OUTPATIENT
Start: 2025-04-25 | End: 2025-04-25 | Stop reason: HOSPADM

## 2025-04-25 RX ORDER — LANOLIN ALCOHOL/MO/W.PET/CERES
250 CREAM (GRAM) TOPICAL DAILY
Status: DISCONTINUED | OUTPATIENT
Start: 2025-04-25 | End: 2025-04-25 | Stop reason: HOSPADM

## 2025-04-25 RX ORDER — CARVEDILOL 12.5 MG/1
6.25 TABLET ORAL 2 TIMES DAILY WITH MEALS
Status: DISCONTINUED | OUTPATIENT
Start: 2025-04-25 | End: 2025-04-25 | Stop reason: HOSPADM

## 2025-04-25 RX ORDER — BUSPIRONE HYDROCHLORIDE 5 MG/1
5 TABLET ORAL 2 TIMES DAILY
Status: DISCONTINUED | OUTPATIENT
Start: 2025-04-25 | End: 2025-04-25 | Stop reason: HOSPADM

## 2025-04-25 RX ORDER — LACTULOSE 10 G/15ML
30 SOLUTION ORAL 2 TIMES DAILY
Status: DISCONTINUED | OUTPATIENT
Start: 2025-04-25 | End: 2025-04-25 | Stop reason: HOSPADM

## 2025-04-25 RX ADMIN — THIAMINE HCL TAB 100 MG 250 MG: 100 TAB at 09:10

## 2025-04-25 RX ADMIN — BUSPIRONE HYDROCHLORIDE 5 MG: 5 TABLET ORAL at 09:10

## 2025-04-25 RX ADMIN — LACTULOSE 30 G: 10 SOLUTION ORAL at 09:10

## 2025-04-25 RX ADMIN — CARVEDILOL 6.25 MG: 12.5 TABLET, FILM COATED ORAL at 07:30

## 2025-04-25 RX ADMIN — RIFAXIMIN 550 MG: 550 TABLET ORAL at 09:11

## 2025-04-25 NOTE — ED NOTES
This RN verified with provider which would be the correct monitoring order for this patient and she confirmed Virtual.      Margarita Hickman RN  04/25/25 0701

## 2025-04-25 NOTE — ED NOTES
Discharge and Safety Plan    This writer discussed the patients current presentation and recommended discharge plan with Dr. Hinton. They agree with the patient being discharged at this time with referrals and/or information about - patient declined referrals at this time due to no prior history.     The patient was Instructed to follow up with their PCP and other medical providers.   The patient was provided with referral information for: patient decline additional referrals at this time. Patient will follow up with medical providers in place.     In addition, the patient was instructed to call local [facility] crisis, other crisis services, 911 or to go to the nearest ER immediately if their situation changes at any time.     This writer discussed discharge plans with the patient, who agrees with and understands the discharge plans.       SAFETY PLAN  Warning Signs (thoughts, images, mood, behavior, situations) of a potential crisis: situations    Coping Skills (what can I do to take my mind off the problem, or to keep myself safe): engage in healthy activities    Outside Support (who can I reach out to for support and help): therapist      National Suicide Prevention Hotline:  [address] [place] [address] [phone] - [facility] [phone] - [facility] Crisis/Mobile Crisis   [phone] - [facility] Crisis   [facility]: [phone]  [facility]: [phone]   [facility] [phone] - Crisis   [facility] [phone] - Crisis     [facility] [phone] - Crisis   [facility] [phone] - Crisis   [phone] - Peer Support Talk Line (1-9pm daily)  [phone] - Teen Support Talk Line (1-9pm daily)  [phone] - [facility]   [facility] [phone]- Crisis    [facility] [phone] - Crisis   [facility] [phone] - Crisis    [facility]) [phone] - [facility] Crisis      TDS, CW

## 2025-04-25 NOTE — ED PROVIDER NOTES
Time reflects when diagnosis was documented in both MDM as applicable and the Disposition within this note       Time User Action Codes Description Comment    4/25/2025  2:58 AM Naomi Diamond Add [F10.929] Alcohol intoxication (HCC)           ED Disposition       None          Assessment & Plan       Medical Decision Making  Amount and/or Complexity of Data Reviewed  Labs: ordered.    Risk  OTC drugs.  Prescription drug management.      Patient is calm, clinically sober, however his alcohol level is elevated at 0.97.  302 noted, patient denies any recent SI or HI but does states that he has felt this week in the past.  Will obtain labs, consult crisis for evaluation later this morning.       Medications   mirtazapine (REMERON) tablet 7.5 mg (has no administration in time range)   lactulose (CHRONULAC) oral solution 30 g (has no administration in time range)   carvedilol (COREG) tablet 6.25 mg (has no administration in time range)   thiamine tablet 250 mg (has no administration in time range)   busPIRone (BUSPAR) tablet 5 mg (has no administration in time range)   rifaximin (XIFAXAN) tablet 550 mg (has no administration in time range)       ED Risk Strat Scores                    No data recorded                            History of Present Illness       Chief Complaint   Patient presents with    Psychiatric Evaluation     Pt brought in by [employer] for a 302 petition. According to SP, pt's wife states that the pt has cirrhosis and is not taking their daily medications. Wife is stating that the patient has HI against her and multiple SI attempts. Pt denies SI HI at this time.       Past Medical History:   Diagnosis Date    Arthritis     Asthma     Chronic pain disorder     Chronic pancreatitis (HCC)     Cirrhosis (HCC)     early cirrhosis    GERD (gastroesophageal reflux disease)     History of COVID-19 01/2022    mild s/s    Hyperlipidemia     Hypertension     Liver abscess     Liver disease     Meniscus tear      left knee work injury last assessed 08/24/2016    Pancreatitis     Pneumonia     Rotator cuff tear     Stomach disorder     Chronic gastritus      Past Surgical History:   Procedure Laterality Date    CELIAC PLEXUS BLOCK Bilateral 10/29/2018    Procedure: SPLANCHNIC NERVE BLOCK;  Surgeon: Ramiro Chinchilla MD;  [facility];  Service: Pain Management     CELIAC PLEXUS BLOCK Bilateral 01/10/2019    Procedure: SPLANCHNIC NERVE BLOCK;  Surgeon: Ramiro Chinchilla MD;  [facility];  Service: Pain Management     CHOLECYSTECTOMY      COLONOSCOPY      ESOPHAGOGASTRODUODENOSCOPY N/A 11/16/2017    Procedure: ESOPHAGOGASTRODUODENOSCOPY (EGD);  Surgeon: Ever Bonner MD;  [facility];  Service: Gastroenterology    ESOPHAGOGASTRODUODENOSCOPY N/A 04/19/2018    Procedure: ESOPHAGOGASTRODUODENOSCOPY (EGD);  Surgeon: Orestes Forrest MD;  [facility];  Service: Gastroenterology    ESOPHAGOGASTRODUODENOSCOPY N/A 05/10/2018    Procedure: ESOPHAGOGASTRODUODENOSCOPY (EGD);  Surgeon: Vaibhav Matthew MD;  [facility];  Service: Gastroenterology    ESOPHAGUS SURGERY  01/17/2025    nubia galindo tear repain    HERNIA REPAIR Bilateral 02/03/2023    Procedure: REPAIR HERNIA INGUINAL, LAPAROSCOPIC,;  Surgeon: Juanjo Travis DO;  [facility];  Service: General    IR TEMPORARY DIALYSIS CATHETER PLACEMENT  02/28/2019    KNEE ARTHROSCOPY Bilateral     KNEE ARTHROSCOPY Right 2009    cibishino last assessed 08/24/2016    KNEE ARTHROSCOPY Right 07/01/2019    LIVER SURGERY      NERVE BLOCK Bilateral 05/29/2018    Procedure: SPLANCHNIC NERVE BLOCK;  Surgeon: Ramiro Chinchilla MD;  [facility];  Service: Pain Management     PANCREAS SURGERY      stents    PANCREATIC CYST EXCISION      VA INJECTION ANES LMBR/THRC PARAVERTBRL SYMPATHETIC Bilateral 12/28/2017    Procedure: SPLANCHNIC NERVE BLOCK;  Surgeon: Ramiro Chinchilla MD;  [facility];  Service: Pain Management     VA INJX ANES CELIAC  PLEXUS W/WO RADIOLOGIC MONITRNG Bilateral 05/09/2017    Procedure: CELIAC PLEXUS BLOCK ;  Surgeon: Ramiro Chinchilla MD;  [facility];  Service: Pain Management     FL INJX ANES CELIAC PLEXUS W/WO RADIOLOGIC MONITRNG Bilateral 06/01/2017    Procedure: SPLANCHNIC NERVE BLOCK at T12;  Surgeon: Ramiro Chinchilla MD;  [facility];  Service: Pain Management     FL INJX ANES CELIAC PLEXUS W/WO RADIOLOGIC MONITRNG Bilateral 08/08/2017    Procedure: BILATERAL SPLANCHNIC NERVE BLOCK T12;  Surgeon: Ramiro Chinchilla MD;  [facility];  Service: Pain Management     FL INJX ANES CELIAC PLEXUS W/WO RADIOLOGIC MONITRNG Bilateral 04/18/2019    Procedure: BLOCK / INJECTION CELIAC PLEXUS;  Surgeon: Ramiro Chinchilla MD;  [facility];  Service: Pain Management     FL INJX ANES CELIAC PLEXUS W/WO RADIOLOGIC MONITRNG Bilateral 08/20/2019    Procedure: SPLANCHNIC NERVE BLOCK;  Surgeon: Ramiro Chinchilla MD;  [facility];  Service: Pain Management     FL INJX ANES CELIAC PLEXUS W/WO RADIOLOGIC MONITRNG Bilateral 10/17/2019    Procedure: SPLANCHNIC NERVE BLOCK;  Surgeon: Ramiro Chinchilla MD;  [facility];  Service: Pain Management     FL LAPAROSCOPY SURG CHOLECYSTECTOMY N/A 02/14/2017    Procedure: LAPAROSCOPIC CHOLECYSTECTOMY, IOC, POSSIBLE OPEN.;  Surgeon: Suresh Lang MD;  [facility];  Service: General    FL SURGICAL ARTHROSCOPY SHOULDER W/ROTATOR CUFF RPR Left 06/09/2023    Procedure: Left Shoulder Arthroscopic Rotator Cuff Repair, Open Subpectoral Biceps Tenodesis, Acromioplasty, Extensive Debridement;  Surgeon: Toro Healy DO;  [facility];  Service: Orthopedics    ROTATOR CUFF REPAIR Right     SHOULDER ARTHROSCOPY      SHOULDER ARTHROSCOPY Right     SHOULDER SURGERY      TENDON REPAIR  2015    Right shoulder      Family History   Problem Relation Age of Onset    Cirrhosis Mother     Heart disease Other         cardiac disorder    Cancer Other       Social History      Tobacco Use    Smoking status: Former     Current packs/day: 0.00     Average packs/day: 1 pack/day for 20.0 years (20.0 ttl pk-yrs)     Types: Cigarettes     Start date: 3/15/2001     Quit date: 3/15/2021     Years since quittin.1    Smokeless tobacco: Never   Vaping Use    Vaping status: Some Days    Substances: Nicotine   Substance Use Topics    Alcohol use: Not Currently     Alcohol/week: 1.0 standard drink of alcohol     Types: 1 Cans of beer per week     Comment: on weekends    Drug use: No      E-Cigarette/Vaping    E-Cigarette Use Current Some Day User       E-Cigarette/Vaping Substances    Nicotine Yes     THC No     CBD No     Flavoring No     Other No     Unknown No       I have reviewed and agree with the history as documented.     HPI  50-year-old male past medical history of alcohol abuse, cirrhosis presents on 302 brought in by police.  302 was petitioned by patient's wife, states that patient sent text messages that he is going to harm himself with a gun.  Also states that patient does not take his medications.  Patient states that he may have sent these messages in the past, however not recently.  He denies SI and HI.  States he is in a dispute with his wife.  He reports that he is compliant with his medications.  He does report drinking this evening.  Review of Systems   Constitutional:  Negative for chills and fever.   HENT:  Negative for dental problem and ear pain.    Eyes:  Negative for pain and redness.   Respiratory:  Negative for cough and shortness of breath.    Cardiovascular:  Negative for chest pain and palpitations.   Gastrointestinal:  Negative for abdominal pain and nausea.   Endocrine: Negative for polydipsia and polyphagia.   Genitourinary:  Negative for dysuria and frequency.   Musculoskeletal:  Negative for arthralgias and joint swelling.   Skin:  Negative for color change and rash.   Neurological:  Negative for dizziness and headaches.   Psychiatric/Behavioral:  Negative  for behavioral problems and confusion.    All other systems reviewed and are negative.          Objective       ED Triage Vitals [04/25/25 0235]   Temperature Pulse Blood Pressure Respirations SpO2 Patient Position - Orthostatic VS   98.1 °F (36.7 °C) 96 155/85 18 95 % --      Temp Source Heart Rate Source BP Location FiO2 (%) Pain Score    Oral Monitor -- -- --      Vitals      Date and Time Temp Pulse SpO2 Resp BP Pain Score FACES Pain Rating User   04/25/25 0235 98.1 °F (36.7 °C) 96 95 % 18 155/85 -- -- EJ            Physical Exam  Vitals and nursing note reviewed.   Constitutional:       General: He is not in acute distress.     Appearance: He is well-developed. He is not diaphoretic.   HENT:      Head: Atraumatic.      Right Ear: External ear normal.      Left Ear: External ear normal.      Nose: Nose normal.   Eyes:      Conjunctiva/sclera: Conjunctivae normal.      Pupils: Pupils are equal, round, and reactive to light.   Neck:      Vascular: No JVD.   Cardiovascular:      Rate and Rhythm: Normal rate and regular rhythm.      Heart sounds: Normal heart sounds. No murmur heard.  Pulmonary:      Effort: Pulmonary effort is normal. No respiratory distress.      Breath sounds: Normal breath sounds. No wheezing.   Abdominal:      General: Bowel sounds are normal. There is no distension.      Palpations: Abdomen is soft.      Tenderness: There is no abdominal tenderness.   Musculoskeletal:         General: Normal range of motion.      Cervical back: Normal range of motion and neck supple.   Skin:     General: Skin is warm and dry.      Capillary Refill: Capillary refill takes less than 2 seconds.   Neurological:      Mental Status: He is alert and oriented to person, place, and time.      Cranial Nerves: No cranial nerve deficit.   Psychiatric:         Behavior: Behavior normal.         Results Reviewed       Procedure Component Value Units Date/Time    CBC and differential [[id number]]  (Abnormal) Collected:  04/25/25 0319    Lab Status: Final result Specimen: Blood from Hand, Left Updated: 04/25/25 0429     WBC 7.50 Thousand/uL      RBC 4.44 Million/uL      Hemoglobin 10.3 g/dL      Hematocrit 34.4 %      MCV 78 fL      MCH 23.2 pg      MCHC 29.9 g/dL      RDW 20.4 %      MPV 11.0 fL      Platelets 86 Thousands/uL      nRBC 0 /100 WBCs      Segmented % 73 %      Immature Grans % 0 %      Lymphocytes % 18 %      Monocytes % 9 %      Eosinophils Relative 0 %      Basophils Relative 0 %      Absolute Neutrophils 5.42 Thousands/µL      Absolute Immature Grans 0.02 Thousand/uL      Absolute Lymphocytes 1.35 Thousands/µL      Absolute Monocytes 0.70 Thousand/µL      Eosinophils Absolute 0.00 Thousand/µL      Basophils Absolute 0.01 Thousands/µL     Narrative:      This is an appended report.  These results have been appended to a previously verified report.    Smear Review(Phlebs Do Not Order) [[id number]]  (Abnormal) Collected: 04/25/25 0319    Lab Status: Final result Specimen: Blood from Hand, Left Updated: 04/25/25 0429     RBC Morphology Present     Platelet Estimate Decreased     Anisocytosis Present    Rapid drug screen, urine [[id number]]  (Normal) Collected: 04/25/25 0332    Lab Status: Final result Specimen: Urine, Clean Catch Updated: 04/25/25 0421     Amph/Meth UR Negative     Barbiturate Ur Negative     Benzodiazepine Urine Negative     Cocaine Urine Negative     Methadone Urine Negative     Opiate Urine Negative     PCP Ur Negative     THC Urine Negative     Oxycodone Urine Negative     Fentanyl Urine Negative     HYDROCODONE URINE Negative    Narrative:      FOR MEDICAL PURPOSES ONLY.   IF CONFIRMATION NEEDED PLEASE CONTACT THE LAB WITHIN 5 DAYS.    Drug Screen Cutoff Levels:  AMPHETAMINE/METHAMPHETAMINES  1000 ng/mL  BARBITURATES     200 ng/mL  BENZODIAZEPINES     200 ng/mL  COCAINE      300 ng/mL  METHADONE      300 ng/mL  OPIATES      300 ng/mL  PHENCYCLIDINE     25 ng/mL  THC       50  ng/mL  OXYCODONE      100 ng/mL  FENTANYL      5 ng/mL  HYDROCODONE     300 ng/mL    TSH [[id number]]  (Normal) Collected: 04/25/25 0319    Lab Status: Final result Specimen: Blood from Hand, Left Updated: 04/25/25 0401     TSH 3RD GENERATON 0.609 uIU/mL     Urine Microscopic [[id number]]  (Normal) Collected: 04/25/25 0332    Lab Status: Final result Specimen: Urine, Clean Catch Updated: 04/25/25 0351     RBC, UA 1-2 /hpf      WBC, UA None Seen /hpf      Epithelial Cells None Seen /hpf      Bacteria, UA None Seen /hpf     UA w Reflex to Microscopic w Reflex to Culture [[id number]]  (Abnormal) Collected: 04/25/25 0332    Lab Status: Final result Specimen: Urine, Clean Catch Updated: 04/25/25 0350     Color, UA Light Yellow     Clarity, UA Clear     Specific Gravity, UA 1.038     pH, UA 6.5     Leukocytes, UA Elevated glucose may cause decreased leukocyte values. See urine microscopic for UWBC result     Nitrite, UA Negative     Protein, UA Negative mg/dl      Glucose, UA >=1000 (1%) mg/dl      Ketones, UA Negative mg/dl      Urobilinogen, UA <2.0 mg/dl      Bilirubin, UA Negative     Occult Blood, UA Negative    Comprehensive metabolic panel [[id number]]  (Abnormal) Collected: 04/25/25 0319    Lab Status: Final result Specimen: Blood from Hand, Left Updated: 04/25/25 0349     Sodium 141 mmol/L      Potassium 3.5 mmol/L      Chloride 107 mmol/L      CO2 24 mmol/L      ANION GAP 10 mmol/L      BUN 9 mg/dL      Creatinine 0.44 mg/dL      Glucose 164 mg/dL      Calcium 9.4 mg/dL      AST 36 U/L      ALT 23 U/L      Alkaline Phosphatase 129 U/L      Total Protein 7.3 g/dL      Albumin 4.0 g/dL      Total Bilirubin 1.22 mg/dL      eGFR 131 ml/min/1.73sq m     Narrative:      National Kidney Disease Foundation guidelines for Chronic Kidney Disease (CKD):     Stage 1 with normal or high GFR (GFR > 90 mL/min/1.73 square meters)    Stage 2 Mild CKD (GFR = 60-89 mL/min/1.73 square meters)    Stage 3A Moderate CKD (GFR = 45-59  mL/min/1.73 square meters)    Stage 3B Moderate CKD (GFR = 30-44 mL/min/1.73 square meters)    Stage 4 Severe CKD (GFR = 15-29 mL/min/1.73 square meters)    Stage 5 End Stage CKD (GFR <15 mL/min/1.73 square meters)  Note: GFR calculation is accurate only with a steady state creatinine    POCT alcohol breath test [[id number]]  (Normal) Resulted: 04/25/25 0250    Lab Status: Final result Updated: 04/25/25 0250     EXTBreath Alcohol 0.97            No orders to display       ECG 12 Lead Documentation Only    Date/Time: 4/25/2025 3:17 AM    Performed by: Naomi Diamond MD  Authorized by: Naomi Diamond MD    Comments:      NSR rate of 70 normal axis and intervals no acute ST elevations or depressions      ED Medication and Procedure Management   Prior to Admission Medications   Prescriptions Last Dose Informant Patient Reported? Taking?   Choline Fenofibrate (Fenofibric Acid) 135 MG CPDR  Self, Spouse/Significant Other No No   Sig: TAKE ONE CAPSULE BY MOUTH DAILY   Empagliflozin 10 MG TABS  Self, Spouse/Significant Other Yes No   Sig: Take 10 mg by mouth every morning   Insulin Pen Needle (Pen Needles) 32G X 5 MM MISC  Self, Spouse/Significant Other Yes No   Sig: use once daily as directed   Multiple Vitamins-Minerals (MULTIVITAMIN ADULTS PO)  Self, Spouse/Significant Other Yes No   Sig: Take by mouth daily after breakfast   NON FORMULARY  Self, Spouse/Significant Other Yes No   Sig: Pantessin 1 capsule daily   Patient not taking: Reported on 1/30/2025   Pancrelipase, Lip-Prot-Amyl, (Creon) 66364-919158 units CPEP  Self, Spouse/Significant Other No No   Sig: TAKE 1 CAPSULE (36,000 UNITS TOTAL) BY MOUTH 3 (THREE) TIMES A DAY BEFORE MEALS   TURMERIC PO  Self, Spouse/Significant Other Yes No   Sig: Take by mouth in the morning   Patient not taking: Reported on 1/30/2025   Thiamine HCl (vitamin B-1) 250 MG tablet  Self, Spouse/Significant Other Yes No   Sig: Take 250 mg by mouth daily   albuterol (PROVENTIL HFA,VENTOLIN HFA)  90 mcg/act inhaler  Self, Spouse/Significant Other No No   Sig: Inhale 2 puffs every 6 (six) hours as needed for wheezing   amLODIPine (NORVASC) 5 mg tablet  Self, Spouse/Significant Other No No   Sig: Take 1 tablet (5 mg total) by mouth daily   busPIRone (BUSPAR) 5 mg tablet  Self, Spouse/Significant Other No No   Sig: Take 1 tablet (5 mg total) by mouth 2 (two) times a day   carvedilol (COREG) 3.125 mg tablet  Self, Spouse/Significant Other No No   Sig: Take 2 tablets (6.25 mg total) by mouth 2 (two) times a day with meals   esomeprazole (NexIUM) 40 MG capsule  Self, Spouse/Significant Other No No   Sig: Take 1 capsule (40 mg total) by mouth if needed (heartburn)   famotidine (PEPCID) 40 MG tablet  Self, Spouse/Significant Other Yes No   Sig: Take 40 mg by mouth daily at bedtime as needed   fluticasone (FLONASE) 50 mcg/act nasal spray  Self, Spouse/Significant Other No No   Si spray into each nostril daily   Patient not taking: Reported on 2025   insulin degludec (Tresiba FlexTouch) 100 units/mL injection pen  Self, Spouse/Significant Other Yes No   Si units subcut in hs   lactulose (CHRONULAC) 10 g/15 mL solution  Self, Spouse/Significant Other No No   Sig: Take 45 mL (30 g total) by mouth 2 (two) times a day Titrate dose to have 2-3 bowel movements daily. Take an additional dose if you are not having 2-3 bowel movements daily.   lisinopril (ZESTRIL) 20 mg tablet  Self, Spouse/Significant Other No No   Sig: TAKE ONE TABLET BY MOUTH EVERY DAY   Patient not taking: Reported on 3/11/2025   mirtazapine (REMERON) 7.5 MG tablet  Self, Spouse/Significant Other No No   Sig: Take 1 tablet (7.5 mg total) by mouth daily at bedtime   omega-3-acid ethyl esters (LOVAZA) 1 g capsule  Self, Spouse/Significant Other No No   Sig: TAKE TWO CAPSULES BY MOUTH TWICE DAILY   Patient not taking: Reported on 2024   ondansetron (ZOFRAN-ODT) 4 mg disintegrating tablet  Self, Spouse/Significant Other No No   Sig: DISSOLVE  ONE TABLET IN MOUTH EVERY EIGHT HOURS AS NEEDED FOR NAUSEA AND VOMITING   oxyCODONE (Roxicodone) 5 immediate release tablet  Self, Spouse/Significant Other No No   Sig: Take 1 tablet (5 mg total) by mouth every 6 (six) hours as needed for moderate pain for up to 12 doses Max Daily Amount: 20 mg   rifaximin (XIFAXAN) 550 mg tablet  Self, Spouse/Significant Other No No   Sig: Take 1 tablet (550 mg total) by mouth every 12 (twelve) hours   rosuvastatin (CRESTOR) 40 MG tablet  Self, Spouse/Significant Other No No   Sig: TAKE ONE TABLET BY MOUTH EVERY DAY      Facility-Administered Medications: None     Patient's Medications   Discharge Prescriptions    No medications on file     No discharge procedures on file.  ED SEPSIS DOCUMENTATION   Time reflects when diagnosis was documented in both MDM as applicable and the Disposition within this note       Time User Action Codes Description Comment    4/25/2025  2:58 AM Naomi Diamond Add [F10.929] Alcohol intoxication (HCC)                  Naomi Diamond MD  04/25/25 0434

## 2025-04-25 NOTE — ED NOTES
"Pt presents to the ER from home via [facility]. [facility] \"Today around 2300 hrs Ashwin told his wife he was going to find her & kill her. Sent multiple text messages regarding self harm with a gun. Also the wife claims he has not taken medication which causes him to hallucinate. The wife believes he is a harm to himself & herself and believes he should be psychological eval.\" CW allowed pt to provide details as to events prior to reading / reviewing 302 w/pt. Pt states, \"I believe I know where this is all stemming from. Earlier this week I had to call the [facility] on my wife because she had been drinking and tried to get in the car and go out. I took the keys from her. She told me to call the police and I did. She has a hx of drinking heavily over the years and has attended many detox / rehab facilities. She is on one of her episodes again. [facility], and this week she has been drinking all week. She will drink and then go out and buy more. In fact I found a receipt yesterday again that she bought more, she then left the house. I tried looking for her and was calling her to let her know to stay where she was and I would get her so she wouldn't drive drunk. She has not been talking to me all week either since I called the police.\" Pt is calm, cooperative, maintains good eye contact and is forthcoming w/details. CW read and reviewed 302 w/pt. CW read 302 rights to pt. Pt denies having made any homicidal threats nor any suicidal threats. Pt denies hx of MH illness and or tx. Pt confirms taking medications as prescribed. Pt adds, \"I always follow up w/my medical issues. I didn't take my medicine today because I'm here but I take my meds everyday.\" Pt denies any hx of AVH's. Pt does not appear to have any hallucinations present at this time. Pt confirms drinking once or twice per week. Pt denies legal issues and or use of illegal " "substances. Pt works full time and reports medical issues. Pt is not seeking IP tx. 302 appears to contain  information and pt states, \"She's been drinking how is the information valid?\" CW provided Dr. Hinton w/details. Dr. Hinton will deny the 302 at this time and pt will be discharged home. Pt declined additional OP resources at this time.     CW sent denied 302 documents to [facility] office via email.    TDS, CW  "

## 2025-04-25 NOTE — Clinical Note
Ashwin Wright was seen and treated in our emergency department on 4/25/2025.    No restrictions            Diagnosis:     Ashwin  may return to work on return date.    He may return on this date: 04/28/2025         If you have any questions or concerns, please don't hesitate to call.      Fe Hinton, DO    ______________________________           _______________          _______________  Hospital Representative                              Date                                Time

## 2025-04-25 NOTE — ED RE-EVALUATION NOTE
Received sign-out at 7AM from Dr. Diamond.  Patient had presented overnight after patient's wife petitioned a 302 after he allegedly made suicidal and homicidal threats toward her.  Patient had been clinically intoxicated at that time and was awaiting sobriety for crisis evaluation.  Overnight he did deny making any threats and denied feeling suicidal.    Patient assessed by ED crisis worker this morning at approximately 8:30 AM.  Patient clinically sober.  He denied making any suicidal or homicidal threats to the crisis worker and during my evaluation he denied this as well and denies feeling suicidal currently.  He states his wife is an alcoholic and recently tried to drive intoxicated so he called police on her which made her very upset with him over the past 1 week.  Patient contracts for safety and crisis worker is in agreement that he does not need inpatient psychiatric treatment at this time.  302 denied and patient stable for discharge.  Outpatient mental health resources provided to patient.     Fe Hinton,   04/25/25 1044

## 2025-05-12 ENCOUNTER — OFFICE VISIT (OUTPATIENT)
Dept: FAMILY MEDICINE CLINIC | Facility: CLINIC | Age: 50
End: 2025-05-12
Payer: COMMERCIAL

## 2025-05-12 VITALS
HEART RATE: 84 BPM | HEIGHT: 71 IN | SYSTOLIC BLOOD PRESSURE: 120 MMHG | WEIGHT: 182.8 LBS | DIASTOLIC BLOOD PRESSURE: 64 MMHG | OXYGEN SATURATION: 98 % | RESPIRATION RATE: 18 BRPM | BODY MASS INDEX: 25.59 KG/M2

## 2025-05-12 DIAGNOSIS — Z79.4 CONTROLLED TYPE 2 DIABETES MELLITUS WITH HYPERGLYCEMIA, WITH LONG-TERM CURRENT USE OF INSULIN (HCC): Primary | ICD-10-CM

## 2025-05-12 DIAGNOSIS — Z00.00 ANNUAL PHYSICAL EXAM: ICD-10-CM

## 2025-05-12 DIAGNOSIS — K86.1 CHRONIC PANCREATITIS, UNSPECIFIED PANCREATITIS TYPE (HCC): ICD-10-CM

## 2025-05-12 DIAGNOSIS — E78.2 MIXED HYPERLIPIDEMIA: ICD-10-CM

## 2025-05-12 DIAGNOSIS — E78.1 HYPERTRIGLYCERIDEMIA: ICD-10-CM

## 2025-05-12 DIAGNOSIS — K74.60 CIRRHOSIS OF LIVER WITHOUT ASCITES, UNSPECIFIED HEPATIC CIRRHOSIS TYPE (HCC): ICD-10-CM

## 2025-05-12 DIAGNOSIS — E11.65 CONTROLLED TYPE 2 DIABETES MELLITUS WITH HYPERGLYCEMIA, WITH LONG-TERM CURRENT USE OF INSULIN (HCC): Primary | ICD-10-CM

## 2025-05-12 LAB
CREAT UR-MCNC: 166.1 MG/DL
MICROALBUMIN UR-MCNC: 10.4 MG/L
MICROALBUMIN/CREAT 24H UR: 6 MG/G CREATININE (ref 0–30)
SL AMB POCT HEMOGLOBIN AIC: 5.6 (ref ?–6.5)

## 2025-05-12 PROCEDURE — 82570 ASSAY OF URINE CREATININE: CPT | Performed by: FAMILY MEDICINE

## 2025-05-12 PROCEDURE — 99396 PREV VISIT EST AGE 40-64: CPT | Performed by: FAMILY MEDICINE

## 2025-05-12 PROCEDURE — 82043 UR ALBUMIN QUANTITATIVE: CPT | Performed by: FAMILY MEDICINE

## 2025-05-12 PROCEDURE — 83036 HEMOGLOBIN GLYCOSYLATED A1C: CPT | Performed by: FAMILY MEDICINE

## 2025-05-12 PROCEDURE — 99214 OFFICE O/P EST MOD 30 MIN: CPT | Performed by: FAMILY MEDICINE

## 2025-05-12 NOTE — ASSESSMENT & PLAN NOTE
Stable denies hypoglycemic episodes negative polyuria polydipsia continued insulin regimen per endocrinology in regard to chronic pancreatitis  Lab Results   Component Value Date    HGBA1C 5.6 05/12/2025       Orders:    POCT hemoglobin A1c    Albumin / creatinine urine ratio

## 2025-05-12 NOTE — PROGRESS NOTES
Adult Annual Physical  Name: Ashwin Wright      : 1975      MRN: [id number]  Encounter Provider: LUANA Mejía  Encounter Date: 2025   Encounter department: [facility] [address]    :  Assessment & Plan  Annual physical exam  Discussed and reviewed age-appropriate anticipatory guidance encouraged continued abstinence alcohol       Controlled type 2 diabetes mellitus with hyperglycemia, with long-term current use of insulin (HCC)  Stable denies hypoglycemic episodes negative polyuria polydipsia continued insulin regimen per endocrinology in regard to chronic pancreatitis  Lab Results   Component Value Date    HGBA1C 5.6 2025       Orders:    POCT hemoglobin A1c    Albumin / creatinine urine ratio    Chronic pancreatitis, unspecified pancreatitis type (HCC)  Stable, asymptomatic continue care per GI  Orders:    Comprehensive metabolic panel; Future    Cirrhosis of liver without ascites, unspecified hepatic cirrhosis type (HCC)  Stable continue current care per GI  Orders:    CBC and differential; Future    Comprehensive metabolic panel; Future    Mixed hyperlipidemia  Tolerating statin to be continued reviewed lipid profile  Orders:    Lipid Panel with Direct LDL reflex; Future    Hypertriglyceridemia  Continue current care  Orders:    Lipid Panel with Direct LDL reflex; Future            Immunizations:  - Immunizations due: Prevnar 20, Tdap, Zoster (Shingrix) and Hepatitis A         History of Present Illness     Adult Annual Physical:  Patient presents for annual physical. Annual   Would like return to work note without restriction  Timothy a , etoh intox , admit hosp , will be discharge   Geraldine spouse  etoh abuse , still active , starting a program today   Spouse tried to get me f302 when she was drunk , er , relaeased   But was prrocessed in system   Personally denies any etoh   [employer] [facility] .     Diet and Physical Activity:  -  "Diet/Nutrition: well balanced diet.  - Exercise: walking.    General Health:  - Sleep: sleeps well.  - Hearing: decreased hearing right ear.  - Vision: wears glasses.  - Dental: no dental visits for > 1 year.     Health:  - History of STDs: no.   - Urinary symptoms: none.     Review of Systems   Constitutional:  Negative for appetite change, chills, fever and unexpected weight change.   HENT:  Negative for congestion, dental problem, ear pain, hearing loss, postnasal drip, rhinorrhea, sinus pressure, sinus pain, sneezing, sore throat, tinnitus and voice change.    Eyes:  Negative for visual disturbance.   Respiratory:  Negative for apnea, cough, chest tightness and shortness of breath.    Cardiovascular:  Negative for chest pain, palpitations and leg swelling.   Gastrointestinal:  Negative for abdominal pain, blood in stool, constipation, diarrhea, nausea and vomiting.   Endocrine: Negative for cold intolerance, heat intolerance, polydipsia, polyphagia and polyuria.   Genitourinary:  Negative for decreased urine volume, difficulty urinating, dysuria, frequency and hematuria.   Musculoskeletal:  Negative for arthralgias, back pain, gait problem, joint swelling and myalgias.   Skin:  Negative for color change, rash and wound.   Allergic/Immunologic: Negative for environmental allergies and food allergies.   Neurological:  Negative for dizziness, syncope, weakness, light-headedness, numbness and headaches.   Hematological:  Negative for adenopathy. Does not bruise/bleed easily.   Psychiatric/Behavioral:  Negative for sleep disturbance and suicidal ideas. The patient is not nervous/anxious.          Objective   /64 (BP Location: Left arm, Patient Position: Sitting) Comment: verbalized reading to patient  Pulse 84   Resp 18   Ht 5' 11\" (1.803 m)   Wt 82.9 kg (182 lb 12.8 oz)   SpO2 98%   BMI 25.50 kg/m²     Physical Exam  Constitutional:       General: He is not in acute distress.     Appearance: He is " well-developed. He is not ill-appearing or toxic-appearing.   HENT:      Head: Normocephalic and atraumatic.   Cardiovascular:      Rate and Rhythm: Normal rate and regular rhythm.      Heart sounds: Normal heart sounds.   Pulmonary:      Effort: Pulmonary effort is normal.      Breath sounds: Normal breath sounds.   Abdominal:      General: Bowel sounds are normal.      Palpations: Abdomen is soft.   Musculoskeletal:         General: Normal range of motion.      Cervical back: Normal range of motion and neck supple.   Skin:     General: Skin is warm and dry.   Neurological:      Mental Status: He is alert and oriented to person, place, and time.      Deep Tendon Reflexes: Reflexes are normal and symmetric.   Psychiatric:         Behavior: Behavior normal.         Thought Content: Thought content normal.         Judgment: Judgment normal.

## 2025-05-12 NOTE — ASSESSMENT & PLAN NOTE
Stable continue current care per GI  Orders:    CBC and differential; Future    Comprehensive metabolic panel; Future

## 2025-05-15 ENCOUNTER — TELEPHONE (OUTPATIENT)
Dept: NEUROSURGERY | Facility: CLINIC | Age: 50
End: 2025-05-15

## 2025-05-15 ENCOUNTER — HOSPITAL ENCOUNTER (EMERGENCY)
Facility: HOSPITAL | Age: 50
Discharge: STILL ADMITTED OR ADMINISTRATIVE | End: 2025-05-15
Attending: EMERGENCY MEDICINE
Payer: COMMERCIAL

## 2025-05-15 ENCOUNTER — HOSPITAL ENCOUNTER (INPATIENT)
Facility: HOSPITAL | Age: 50
LOS: 2 days | Discharge: HOME | End: 2025-05-17
Attending: HOSPITALIST | Admitting: HOSPITALIST
Payer: COMMERCIAL

## 2025-05-15 ENCOUNTER — APPOINTMENT (EMERGENCY)
Dept: CT IMAGING | Facility: HOSPITAL | Age: 50
End: 2025-05-15
Payer: COMMERCIAL

## 2025-05-15 ENCOUNTER — APPOINTMENT (INPATIENT)
Dept: RADIOLOGY | Facility: HOSPITAL | Age: 50
End: 2025-05-15
Payer: COMMERCIAL

## 2025-05-15 VITALS
OXYGEN SATURATION: 100 % | RESPIRATION RATE: 12 BRPM | TEMPERATURE: 98.6 F | SYSTOLIC BLOOD PRESSURE: 136 MMHG | HEART RATE: 86 BPM | DIASTOLIC BLOOD PRESSURE: 77 MMHG

## 2025-05-15 DIAGNOSIS — F10.939 ALCOHOL WITHDRAWAL SYNDROME, WITH UNSPECIFIED COMPLICATION (HCC): Primary | ICD-10-CM

## 2025-05-15 DIAGNOSIS — F10.10 CHRONIC ALCOHOL ABUSE: ICD-10-CM

## 2025-05-15 DIAGNOSIS — S22.000D COMPRESSION FRACTURE OF THORACIC VERTEBRA WITH ROUTINE HEALING, UNSPECIFIED THORACIC VERTEBRAL LEVEL, SUBSEQUENT ENCOUNTER: ICD-10-CM

## 2025-05-15 DIAGNOSIS — S22.000A THORACIC COMPRESSION FRACTURE (HCC): ICD-10-CM

## 2025-05-15 DIAGNOSIS — F10.929 ALCOHOL INTOXICATION (HCC): Primary | ICD-10-CM

## 2025-05-15 PROBLEM — I85.10 SECONDARY ESOPHAGEAL VARICES WITHOUT BLEEDING (HCC): Status: ACTIVE | Noted: 2025-03-11

## 2025-05-15 PROBLEM — F10.20 ALCOHOL USE DISORDER, SEVERE, DEPENDENCE (HCC): Status: ACTIVE | Noted: 2025-05-15

## 2025-05-15 LAB
ALBUMIN SERPL BCG-MCNC: 3.5 G/DL (ref 3.5–5)
ALP SERPL-CCNC: 104 U/L (ref 34–104)
ALT SERPL W P-5'-P-CCNC: 37 U/L (ref 7–52)
AMMONIA PLAS-SCNC: 50 UMOL/L (ref 18–72)
ANION GAP SERPL CALCULATED.3IONS-SCNC: 9 MMOL/L (ref 4–13)
AST SERPL W P-5'-P-CCNC: 81 U/L (ref 13–39)
B-OH-BUTYR SERPL-MCNC: 0.07 MMOL/L (ref 0.2–0.6)
BASOPHILS # BLD AUTO: 0.03 THOUSANDS/ÂΜL (ref 0–0.1)
BASOPHILS NFR BLD AUTO: 1 % (ref 0–1)
BILIRUB SERPL-MCNC: 0.71 MG/DL (ref 0.2–1)
BNP SERPL-MCNC: 10 PG/ML (ref 0–100)
BUN SERPL-MCNC: 7 MG/DL (ref 5–25)
CALCIUM SERPL-MCNC: 7.8 MG/DL (ref 8.4–10.2)
CARDIAC TROPONIN I PNL SERPL HS: 10 NG/L (ref ?–50)
CHLORIDE SERPL-SCNC: 103 MMOL/L (ref 96–108)
CO2 SERPL-SCNC: 30 MMOL/L (ref 21–32)
CREAT SERPL-MCNC: 0.43 MG/DL (ref 0.6–1.3)
EOSINOPHIL # BLD AUTO: 0.1 THOUSAND/ÂΜL (ref 0–0.61)
EOSINOPHIL NFR BLD AUTO: 3 % (ref 0–6)
ERYTHROCYTE [DISTWIDTH] IN BLOOD BY AUTOMATED COUNT: 23.8 % (ref 11.6–15.1)
ETHANOL SERPL-MCNC: 361 MG/DL
GFR SERPL CREATININE-BSD FRML MDRD: 132 ML/MIN/1.73SQ M
GLUCOSE SERPL-MCNC: 121 MG/DL (ref 65–140)
HCT VFR BLD AUTO: 32.5 % (ref 36.5–49.3)
HGB BLD-MCNC: 10.1 G/DL (ref 12–17)
IMM GRANULOCYTES # BLD AUTO: 0.01 THOUSAND/UL (ref 0–0.2)
IMM GRANULOCYTES NFR BLD AUTO: 0 % (ref 0–2)
LIPASE SERPL-CCNC: 40 U/L (ref 11–82)
LYMPHOCYTES # BLD AUTO: 1.8 THOUSANDS/ÂΜL (ref 0.6–4.47)
LYMPHOCYTES NFR BLD AUTO: 47 % (ref 14–44)
MCH RBC QN AUTO: 24.6 PG (ref 26.8–34.3)
MCHC RBC AUTO-ENTMCNC: 31.1 G/DL (ref 31.4–37.4)
MCV RBC AUTO: 79 FL (ref 82–98)
MONOCYTES # BLD AUTO: 0.66 THOUSAND/ÂΜL (ref 0.17–1.22)
MONOCYTES NFR BLD AUTO: 18 % (ref 4–12)
NEUTROPHILS # BLD AUTO: 1.18 THOUSANDS/ÂΜL (ref 1.85–7.62)
NEUTS SEG NFR BLD AUTO: 31 % (ref 43–75)
NRBC BLD AUTO-RTO: 0 /100 WBCS
PLATELET # BLD AUTO: 76 THOUSANDS/UL (ref 149–390)
PMV BLD AUTO: 9.5 FL (ref 8.9–12.7)
POTASSIUM SERPL-SCNC: 3.4 MMOL/L (ref 3.5–5.3)
PROT SERPL-MCNC: 6.6 G/DL (ref 6.4–8.4)
RBC # BLD AUTO: 4.1 MILLION/UL (ref 3.88–5.62)
SODIUM SERPL-SCNC: 142 MMOL/L (ref 135–147)
WBC # BLD AUTO: 3.78 THOUSAND/UL (ref 4.31–10.16)

## 2025-05-15 PROCEDURE — 82140 ASSAY OF AMMONIA: CPT

## 2025-05-15 PROCEDURE — 84484 ASSAY OF TROPONIN QUANT: CPT

## 2025-05-15 PROCEDURE — 71260 CT THORAX DX C+: CPT

## 2025-05-15 PROCEDURE — 74177 CT ABD & PELVIS W/CONTRAST: CPT

## 2025-05-15 PROCEDURE — 96365 THER/PROPH/DIAG IV INF INIT: CPT

## 2025-05-15 PROCEDURE — 96361 HYDRATE IV INFUSION ADD-ON: CPT

## 2025-05-15 PROCEDURE — 85025 COMPLETE CBC W/AUTO DIFF WBC: CPT

## 2025-05-15 PROCEDURE — 36415 COLL VENOUS BLD VENIPUNCTURE: CPT

## 2025-05-15 PROCEDURE — 82010 KETONE BODYS QUAN: CPT

## 2025-05-15 PROCEDURE — 96375 TX/PRO/DX INJ NEW DRUG ADDON: CPT

## 2025-05-15 PROCEDURE — 83880 ASSAY OF NATRIURETIC PEPTIDE: CPT

## 2025-05-15 PROCEDURE — 70450 CT HEAD/BRAIN W/O DYE: CPT

## 2025-05-15 PROCEDURE — 82077 ASSAY SPEC XCP UR&BREATH IA: CPT

## 2025-05-15 PROCEDURE — 72072 X-RAY EXAM THORAC SPINE 3VWS: CPT

## 2025-05-15 PROCEDURE — 99223 1ST HOSP IP/OBS HIGH 75: CPT | Performed by: EMERGENCY MEDICINE

## 2025-05-15 PROCEDURE — 83690 ASSAY OF LIPASE: CPT

## 2025-05-15 PROCEDURE — 80053 COMPREHEN METABOLIC PANEL: CPT

## 2025-05-15 PROCEDURE — 93005 ELECTROCARDIOGRAM TRACING: CPT

## 2025-05-15 PROCEDURE — 99222 1ST HOSP IP/OBS MODERATE 55: CPT | Performed by: HOSPITALIST

## 2025-05-15 PROCEDURE — 99285 EMERGENCY DEPT VISIT HI MDM: CPT

## 2025-05-15 RX ORDER — LACTULOSE 10 G/15ML
30 SOLUTION ORAL 2 TIMES DAILY
Status: DISCONTINUED | OUTPATIENT
Start: 2025-05-15 | End: 2025-05-17 | Stop reason: HOSPADM

## 2025-05-15 RX ORDER — DIAZEPAM 10 MG/2ML
10 INJECTION, SOLUTION INTRAMUSCULAR; INTRAVENOUS ONCE
Status: COMPLETED | OUTPATIENT
Start: 2025-05-15 | End: 2025-05-15

## 2025-05-15 RX ORDER — ACETAMINOPHEN 325 MG/1
650 TABLET ORAL EVERY 6 HOURS PRN
Status: DISCONTINUED | OUTPATIENT
Start: 2025-05-15 | End: 2025-05-17 | Stop reason: HOSPADM

## 2025-05-15 RX ORDER — LORAZEPAM 2 MG/ML
2 INJECTION INTRAMUSCULAR ONCE
Status: COMPLETED | OUTPATIENT
Start: 2025-05-15 | End: 2025-05-15

## 2025-05-15 RX ORDER — ONDANSETRON 2 MG/ML
4 INJECTION INTRAMUSCULAR; INTRAVENOUS ONCE
Status: COMPLETED | OUTPATIENT
Start: 2025-05-15 | End: 2025-05-15

## 2025-05-15 RX ORDER — PANTOPRAZOLE SODIUM 40 MG/1
40 TABLET, DELAYED RELEASE ORAL
Status: DISCONTINUED | OUTPATIENT
Start: 2025-05-15 | End: 2025-05-17 | Stop reason: HOSPADM

## 2025-05-15 RX ORDER — METHOCARBAMOL 500 MG/1
500 TABLET, FILM COATED ORAL EVERY 6 HOURS PRN
Status: DISCONTINUED | OUTPATIENT
Start: 2025-05-15 | End: 2025-05-17 | Stop reason: HOSPADM

## 2025-05-15 RX ORDER — PHENOBARBITAL SODIUM 130 MG/ML
130 INJECTION, SOLUTION INTRAMUSCULAR; INTRAVENOUS ONCE
Status: COMPLETED | OUTPATIENT
Start: 2025-05-15 | End: 2025-05-15

## 2025-05-15 RX ORDER — LORAZEPAM 2 MG/ML
4 INJECTION INTRAMUSCULAR ONCE
Status: DISCONTINUED | OUTPATIENT
Start: 2025-05-15 | End: 2025-05-15

## 2025-05-15 RX ORDER — PHENOBARBITAL SODIUM 130 MG/ML
260 INJECTION, SOLUTION INTRAMUSCULAR; INTRAVENOUS ONCE
Status: COMPLETED | OUTPATIENT
Start: 2025-05-15 | End: 2025-05-15

## 2025-05-15 RX ORDER — OXYCODONE HYDROCHLORIDE 5 MG/1
5 TABLET ORAL EVERY 4 HOURS PRN
Refills: 0 | Status: DISCONTINUED | OUTPATIENT
Start: 2025-05-15 | End: 2025-05-16

## 2025-05-15 RX ORDER — CARVEDILOL 6.25 MG/1
6.25 TABLET ORAL 2 TIMES DAILY WITH MEALS
Status: DISCONTINUED | OUTPATIENT
Start: 2025-05-15 | End: 2025-05-17 | Stop reason: HOSPADM

## 2025-05-15 RX ORDER — POTASSIUM CHLORIDE 1500 MG/1
20 TABLET, EXTENDED RELEASE ORAL ONCE
Status: COMPLETED | OUTPATIENT
Start: 2025-05-15 | End: 2025-05-15

## 2025-05-15 RX ORDER — GABAPENTIN 300 MG/1
300 CAPSULE ORAL 3 TIMES DAILY
Status: DISCONTINUED | OUTPATIENT
Start: 2025-05-15 | End: 2025-05-16

## 2025-05-15 RX ORDER — ONDANSETRON 2 MG/ML
4 INJECTION INTRAMUSCULAR; INTRAVENOUS EVERY 6 HOURS PRN
Status: DISCONTINUED | OUTPATIENT
Start: 2025-05-15 | End: 2025-05-17 | Stop reason: HOSPADM

## 2025-05-15 RX ORDER — SODIUM CHLORIDE 9 MG/ML
125 INJECTION, SOLUTION INTRAVENOUS CONTINUOUS
Status: DISCONTINUED | OUTPATIENT
Start: 2025-05-15 | End: 2025-05-17

## 2025-05-15 RX ORDER — LACTULOSE 10 G/15ML
30 SOLUTION ORAL ONCE
Status: COMPLETED | OUTPATIENT
Start: 2025-05-15 | End: 2025-05-15

## 2025-05-15 RX ORDER — LIDOCAINE 50 MG/G
1 PATCH TOPICAL DAILY
Status: DISCONTINUED | OUTPATIENT
Start: 2025-05-15 | End: 2025-05-17 | Stop reason: HOSPADM

## 2025-05-15 RX ORDER — FAMOTIDINE 20 MG/1
40 TABLET, FILM COATED ORAL
Status: DISCONTINUED | OUTPATIENT
Start: 2025-05-15 | End: 2025-05-17 | Stop reason: HOSPADM

## 2025-05-15 RX ORDER — HYDROMORPHONE HCL/PF 1 MG/ML
0.5 SYRINGE (ML) INJECTION ONCE
Status: COMPLETED | OUTPATIENT
Start: 2025-05-15 | End: 2025-05-15

## 2025-05-15 RX ORDER — MAGNESIUM SULFATE HEPTAHYDRATE 40 MG/ML
2 INJECTION, SOLUTION INTRAVENOUS ONCE
Status: DISCONTINUED | OUTPATIENT
Start: 2025-05-15 | End: 2025-05-15 | Stop reason: HOSPADM

## 2025-05-15 RX ORDER — HYDROMORPHONE HCL/PF 1 MG/ML
0.5 SYRINGE (ML) INJECTION EVERY 4 HOURS PRN
Refills: 0 | Status: DISCONTINUED | OUTPATIENT
Start: 2025-05-15 | End: 2025-05-15

## 2025-05-15 RX ORDER — ATORVASTATIN CALCIUM 80 MG/1
80 TABLET, FILM COATED ORAL
Status: DISCONTINUED | OUTPATIENT
Start: 2025-05-15 | End: 2025-05-17 | Stop reason: HOSPADM

## 2025-05-15 RX ADMIN — SODIUM CHLORIDE 125 ML/HR: 0.9 INJECTION, SOLUTION INTRAVENOUS at 20:31

## 2025-05-15 RX ADMIN — ONDANSETRON 4 MG: 2 INJECTION INTRAMUSCULAR; INTRAVENOUS at 07:49

## 2025-05-15 RX ADMIN — DIAZEPAM 10 MG: 5 INJECTION, SOLUTION INTRAMUSCULAR; INTRAVENOUS at 06:49

## 2025-05-15 RX ADMIN — SODIUM CHLORIDE 125 ML/HR: 0.9 INJECTION, SOLUTION INTRAVENOUS at 12:14

## 2025-05-15 RX ADMIN — THIAMINE HYDROCHLORIDE: 100 INJECTION, SOLUTION INTRAMUSCULAR; INTRAVENOUS at 12:52

## 2025-05-15 RX ADMIN — PHENOBARBITAL SODIUM 130 MG: 130 INJECTION INTRAMUSCULAR; INTRAVENOUS at 20:29

## 2025-05-15 RX ADMIN — LIDOCAINE 5% 1 PATCH: 700 PATCH TOPICAL at 21:51

## 2025-05-15 RX ADMIN — LORAZEPAM 2 MG: 2 INJECTION INTRAMUSCULAR; INTRAVENOUS at 08:10

## 2025-05-15 RX ADMIN — LACTULOSE 30 G: 20 SOLUTION ORAL at 18:03

## 2025-05-15 RX ADMIN — Medication 2.5 MG: at 18:02

## 2025-05-15 RX ADMIN — METHOCARBAMOL TABLETS 500 MG: 500 TABLET, COATED ORAL at 22:49

## 2025-05-15 RX ADMIN — NICOTINE 7 MG: 7 PATCH, EXTENDED RELEASE TRANSDERMAL at 13:50

## 2025-05-15 RX ADMIN — IOHEXOL 100 ML: 350 INJECTION, SOLUTION INTRAVENOUS at 06:25

## 2025-05-15 RX ADMIN — MAGNESIUM SULFATE HEPTAHYDRATE 2 G: 40 INJECTION, SOLUTION INTRAVENOUS at 09:45

## 2025-05-15 RX ADMIN — PANCRELIPASE 36000 UNITS: 120000; 24000; 76000 CAPSULE, DELAYED RELEASE PELLETS ORAL at 13:51

## 2025-05-15 RX ADMIN — POTASSIUM CHLORIDE 20 MEQ: 1500 TABLET, EXTENDED RELEASE ORAL at 07:49

## 2025-05-15 RX ADMIN — Medication 2.5 MG: at 16:55

## 2025-05-15 RX ADMIN — SODIUM CHLORIDE 1000 ML: 0.9 INJECTION, SOLUTION INTRAVENOUS at 06:34

## 2025-05-15 RX ADMIN — CARVEDILOL 6.25 MG: 6.25 TABLET, FILM COATED ORAL at 16:55

## 2025-05-15 RX ADMIN — RIFAXIMIN 550 MG: 550 TABLET ORAL at 20:30

## 2025-05-15 RX ADMIN — HYDROMORPHONE HYDROCHLORIDE 0.5 MG: 1 INJECTION, SOLUTION INTRAMUSCULAR; INTRAVENOUS; SUBCUTANEOUS at 21:50

## 2025-05-15 RX ADMIN — PANCRELIPASE 36000 UNITS: 120000; 24000; 76000 CAPSULE, DELAYED RELEASE PELLETS ORAL at 16:55

## 2025-05-15 RX ADMIN — GABAPENTIN 300 MG: 300 CAPSULE ORAL at 16:57

## 2025-05-15 RX ADMIN — ATORVASTATIN CALCIUM 80 MG: 80 TABLET, FILM COATED ORAL at 16:55

## 2025-05-15 RX ADMIN — LACTULOSE 30 G: 20 SOLUTION ORAL at 08:11

## 2025-05-15 RX ADMIN — PHENOBARBITAL SODIUM 260 MG: 130 INJECTION INTRAMUSCULAR; INTRAVENOUS at 12:13

## 2025-05-15 RX ADMIN — GABAPENTIN 300 MG: 300 CAPSULE ORAL at 20:30

## 2025-05-15 RX ADMIN — PHENOBARBITAL SODIUM 130 MG: 130 INJECTION INTRAMUSCULAR; INTRAVENOUS at 14:26

## 2025-05-15 RX ADMIN — RIFAXIMIN 550 MG: 550 TABLET ORAL at 13:50

## 2025-05-15 NOTE — CONSULTS
Consultation - Medical Toxicology   Name: Ashwin Wright 50 y.o. male I MRN: [id number]  Unit/Bed#: 5T DETOX 511-01 I Date of Admission: 5/15/2025   Date of Service: 5/15/2025 I Hospital Day: 0   Consult to ED crisis worker  Consult performed by: Rajiv Godinez DO  Consult ordered by: Naomi Diamond MD      Physician Requesting Evaluation: Wander Alberto DO   Reason for Evaluation / Principal Problem: Alcohol withdrawal    Assessment & Plan  Alcohol withdrawal syndrome, with unspecified complication (HCC)  Pt has some evidence of early withdrawal with mild tremor. It is noted his ETOH was 361 at about 5AM and he does report felling withdrawal within a few hours of his last drink. Therefore, he is at high risk for worsening withdrawal.   Will start SEWS for phenobarbital dosing as indicated and monitor closely.   Supportive care with thiamine, folate supplementation.   Alcohol use disorder, severe, dependence (HCC)  CM/CRS involvement for aftercare planning and linkage to AUD treatment after discharge  We will discuss naltrexone with him probably tomorrow. It is noted on PDMP that he has had some opioid Rx's recently and has current spinal fractures. So, he will need to demonstrate no need for opioid pain medication for this prior to starting naltrexone.   I have discussed the above management plan in detail with the primary service.       For further questions, please contact the medical  on call via SecureChat between 8am and 9pm. If between 9pm and 8am, please reach out to the [facility] at [phone].     History of Present Illness   Ashwin Wright is a 50 y.o. year old male who presents with alcohol withdrawal. He has been drinking about 1.5L of vodka per day form many years with only brief episodes of abstinence. He has known cirrhosis with portal hypertension and is transferred to the [facility] for ETOH withdrawal management. He currently feels anxious and states he feels withdrawal  symptoms within a few hours of his last drink. He states that every , he tries to go without alcohol, but develops worsening shakes throughout the day that require alcohol eventually to resolve. He denies ant other substance use and also denies a h/o ETOH w/d seizures.     Review of Systems   Constitutional:  Negative for chills and fever.   HENT:  Negative for ear pain and sore throat.    Eyes:  Negative for pain and visual disturbance.   Respiratory:  Negative for cough and shortness of breath.    Cardiovascular:  Negative for chest pain and palpitations.   Gastrointestinal:  Negative for abdominal pain and vomiting.   Genitourinary:  Negative for dysuria and hematuria.   Musculoskeletal:  Positive for back pain. Negative for arthralgias.   Skin:  Negative for color change and rash.   Neurological:  Negative for seizures and syncope.   All other systems reviewed and are negative.      Historical Information   Medical History Review: I have reviewed the patient's PMH, PSH, Social History, Family History, Meds, and Allergies   Social History     Tobacco Use    Smoking status: Former     Current packs/day: 0.00     Average packs/day: 1 pack/day for 20.0 years (20.0 ttl pk-yrs)     Types: Cigarettes     Start date: 3/15/2001     Quit date: 3/15/2021     Years since quittin.1    Smokeless tobacco: Never   Vaping Use    Vaping status: Some Days    Substances: Nicotine   Substance and Sexual Activity    Alcohol use: Yes     Alcohol/week: 1.0 standard drink of alcohol     Types: 1 Cans of beer per week     Comment: on weekends / once or twice per week    Drug use: No    Sexual activity: Yes     Partners: Female     Birth control/protection: None     Family History   Problem Relation Age of Onset    Cirrhosis Mother     Heart disease Other         cardiac disorder    Cancer Other        Meds/Allergies   Prior to Admission medications    Medication Sig Start Date End Date Taking? Authorizing Provider   albuterol  (PROVENTIL HFA,VENTOLIN HFA) 90 mcg/act inhaler Inhale 2 puffs every 6 (six) hours as needed for wheezing 4/21/25   LUANA Mejía   amLODIPine (NORVASC) 5 mg tablet Take 1 tablet (5 mg total) by mouth daily 6/26/24   LUANA Mejía   carvedilol (COREG) 3.125 mg tablet Take 2 tablets (6.25 mg total) by mouth 2 (two) times a day with meals 2/10/25 8/9/25  Rajiv Burgos III, MD   Choline Fenofibrate (Fenofibric Acid) 135 MG CPDR TAKE ONE CAPSULE BY MOUTH DAILY 3/27/25   LUANA Mejía   Empagliflozin 10 MG TABS Take 10 mg by mouth every morning    Historical Provider, MD   esomeprazole (NexIUM) 40 MG capsule Take 1 capsule (40 mg total) by mouth if needed (heartburn) 2/10/25   Rajiv Burgos III, MD   famotidine (PEPCID) 40 MG tablet Take 40 mg by mouth daily at bedtime as needed    Historical Provider, MD   fluticasone (FLONASE) 50 mcg/act nasal spray 1 spray into each nostril daily  Patient not taking: Reported on 1/30/2025 2/5/24   LUANA Mejía   insulin degludec (Tresiba FlexTouch) 100 units/mL injection pen 20 units subcut in hs 6/22/20   Historical Provider, MD   Insulin Pen Needle (Pen Needles) 32G X 5 MM MISC use once daily as directed 4/29/21   Historical Provider, MD   lactulose (CHRONULAC) 10 g/15 mL solution Take 45 mL (30 g total) by mouth 2 (two) times a day Titrate dose to have 2-3 bowel movements daily. Take an additional dose if you are not having 2-3 bowel movements daily. 2/13/25   Dinora Mensah PA-C   lisinopril (ZESTRIL) 20 mg tablet TAKE ONE TABLET BY MOUTH EVERY DAY  Patient not taking: Reported on 3/11/2025 8/11/24   LUANA Mejía   Multiple Vitamins-Minerals (MULTIVITAMIN ADULTS PO) Take by mouth daily after breakfast    Historical Provider, MD   NON FORMULARY Pantessin 1 capsule daily  Patient not taking: Reported on 1/30/2025    Historical Provider, MD   omega-3-acid ethyl esters (LOVAZA) 1 g capsule TAKE TWO CAPSULES BY MOUTH TWICE DAILY  Patient not  taking: Reported on 8/19/2024 7/7/24   LUANA Mejía   ondansetron (ZOFRAN-ODT) 4 mg disintegrating tablet DISSOLVE ONE TABLET IN MOUTH EVERY EIGHT HOURS AS NEEDED FOR NAUSEA AND VOMITING 4/20/25   LUANA Mejía   oxyCODONE (Roxicodone) 5 immediate release tablet Take 1 tablet (5 mg total) by mouth every 6 (six) hours as needed for moderate pain for up to 12 doses Max Daily Amount: 20 mg 2/10/25   LUANA Pacheco   Pancrelipase, Lip-Prot-Amyl, (Creon) 50268-724216 units CPEP TAKE 1 CAPSULE (36,000 UNITS TOTAL) BY MOUTH 3 (THREE) TIMES A DAY BEFORE MEALS 9/10/24   Dinora Mensah PA-C   rifaximin (XIFAXAN) 550 mg tablet Take 1 tablet (550 mg total) by mouth every 12 (twelve) hours 3/11/25 6/9/25  Dinora Mensah PA-C   rosuvastatin (CRESTOR) 40 MG tablet TAKE ONE TABLET BY MOUTH EVERY DAY 7/5/24   LUANA Mejía   Thiamine HCl (vitamin B-1) 250 MG tablet Take 250 mg by mouth daily 1/22/25 2/21/25  Historical Provider, MD   TURMERIC PO Take by mouth in the morning  Patient not taking: Reported on 1/30/2025    Historical Provider, MD   Current Medications[1]   Allergies[2]    Objective :  Temp:  [97.5 °F (36.4 °C)-98.6 °F (37 °C)] 97.5 °F (36.4 °C)  HR:  [71-92] 71  BP: (135-155)/(70-91) 140/89  Resp:  [12-18] 16  SpO2:  [96 %-100 %] 96 %  O2 Device: None (Room air)  Nasal Cannula O2 Flow Rate (L/min):  [5 L/min] 5 L/min    No intake or output data in the 24 hours ending 05/15/25 1132    Physical Exam  Constitutional:       General: He is not in acute distress.  HENT:      Mouth/Throat:      Mouth: Mucous membranes are moist.     Eyes:      Extraocular Movements: Extraocular movements intact.      Pupils: Pupils are equal, round, and reactive to light.       Cardiovascular:      Rate and Rhythm: Normal rate and regular rhythm.   Pulmonary:      Effort: Pulmonary effort is normal.   Abdominal:      Palpations: Abdomen is soft.     Musculoskeletal:      Left lower leg: No edema.     Skin:     Coloration:  Skin is not jaundiced.     Neurological:      Mental Status: He is alert and oriented to person, place, and time.      Comments: Mild intention tremor         Lab Results: I have reviewed the following results:  Results from last 7 days   Lab Units 05/15/25  0547   WBC Thousand/uL 3.78*   HEMOGLOBIN g/dL 10.1*   HEMATOCRIT % 32.5*   PLATELETS Thousands/uL 76*   SEGS PCT % 31*   LYMPHO PCT % 47*   MONO PCT % 18*   EOS PCT % 3      Results from last 7 days   Lab Units 05/15/25  0547   POTASSIUM mmol/L 3.4*   CHLORIDE mmol/L 103   CO2 mmol/L 30   BUN mg/dL 7   CREATININE mg/dL 0.43*   CALCIUM mg/dL 7.8*   ALBUMIN g/dL 3.5   ALK PHOS U/L 104   ALT U/L 37   AST U/L 81*              Results from last 7 days   Lab Units 05/15/25  0605 05/15/25  0547   HS TNI 0HR ng/L 10  --    BNP pg/mL  --  10          Results from last 7 days   Lab Units 05/15/25  0547   ETHANOL LVL mg/dL 361*     Imaging Results Review: No pertinent imaging studies reviewed.  Other Study Results Review: EKG was reviewed.     Administrative Statements   I have spent a total time of 45 minutes in caring for this patient on the day of the visit/encounter including Impressions, Documenting in the medical record, Obtaining or reviewing history  , and Communicating with other healthcare professionals .       [1] No current facility-administered medications for this encounter.  [2]   Allergies  Allergen Reactions    Bee Venom Swelling    Percocet [Oxycodone-Acetaminophen] GI Bleeding, Vomiting and Abdominal Pain

## 2025-05-15 NOTE — ASSESSMENT & PLAN NOTE
History of esophageal varices with intermittent bleeding  No active reports of melena or hematemesis  Baseline hemoglobin between 9 and 11, currently 10.1 and at baseline  Monitor hemoglobin  Continue carvedilol  CT imaging does show portal colopathy with findings of colonic wall thickening to ascending colon  Continue outpatient follow-up with the GI team

## 2025-05-15 NOTE — ASSESSMENT & PLAN NOTE
Pt has some evidence of early withdrawal with mild tremor. It is noted his ETOH was 361 at about 5AM and he does report felling withdrawal within a few hours of his last drink. Therefore, he is at high risk for worsening withdrawal.   Will start SEWS for phenobarbital dosing as indicated and monitor closely.   Supportive care with thiamine, folate supplementation.

## 2025-05-15 NOTE — ASSESSMENT & PLAN NOTE
Last drink 4:30 AM 5/15  Serum alcohol 361 in the ED  Received 2 mg of lorazepam and 10 mg of Valium in the ED PTA  Without history of seizures  Toxicology consulted; appreciate recommendations   Initiate SEWS protocol for medical management of alcohol withdrawal  Current alcohol withdrawal signs/symptoms include nausea, anxiety, tremors  SEWS score 9 upon admission  Administer 260 mg of phenobarbital as initial dose  Continue monitoring under protocol and administer phenobarbital as indicated  Continuous pulse ox and telemetry monitoring

## 2025-05-15 NOTE — LETTER
[address] INPATIENT DETOX  [address] [place] [address]  [phone]  Dept: [phone]    May 17, 2025     Patient: Ashwin Wright   YOB: 1975   Date of Visit: 5/15/2025       To Whom it May Concern:    Ashwin Wright is under my professional care. He was seen in the hospital from 5/15/2025 to 05/17/25. He is scheduled to for two week Neurosurgery follow up on 6/4/25 who will assess his ability to return to work and his limitations at that time.    If you have any questions or concerns, please don't hesitate to call.         Sincerely,          Tahmina Winkler PA-C

## 2025-05-15 NOTE — EMTALA/ACUTE CARE TRANSFER
[facility] EMERGENCY DEPARTMENT  [address] [facility] [address]  Dept: [phone]      EMTALA TRANSFER CONSENT    NAME Ashwin GAMEZ 1975                              MRN [id number]    I have been informed of my rights regarding examination, treatment, and transfer   by Dr. Jony Flores    Benefits:      Risks:        Consent for Transfer:  I acknowledge that my medical condition has been evaluated and explained to me by the emergency department physician or other qualified medical person and/or my attending physician, who has recommended that I be transferred to the service of    at  . The above potential benefits of such transfer, the potential risks associated with such transfer, and the probable risks of not being transferred have been explained to me, and I fully understand them.  The doctor has explained that, in my case, the benefits of transfer outweigh the risks.  I agree to be transferred.    I authorize the performance of emergency medical procedures and treatments upon me in both transit and upon arrival at the receiving facility.  Additionally, I authorize the release of any and all medical records to the receiving facility and request they be transported with me, if possible.  I understand that the safest mode of transportation during a medical emergency is an ambulance and that the Hospital advocates the use of this mode of transport. Risks of traveling to the receiving facility by car, including absence of medical control, life sustaining equipment, such as oxygen, and medical personnel has been explained to me and I fully understand them.    (MARIANNE CORRECT BOX BELOW)  [  ]  I consent to the stated transfer and to be transported by ambulance/helicopter.  [  ]  I consent to the stated transfer, but refuse transportation by ambulance and accept full responsibility for my transportation by car.  I understand the risks of  non-ambulance transfers and I exonerate the Hospital and its staff from any deterioration in my condition that results from this refusal.    X___________________________________________    DATE  05/15/25  TIME________  Signature of patient or legally responsible individual signing on patient behalf           RELATIONSHIP TO PATIENT_________________________          Provider Certification    NAME Ashwin Wright                                         1975                              MRN [id number]    A medical screening exam was performed on the above named patient.  Based on the examination:    Condition Necessitating Transfer The primary encounter diagnosis was Alcohol intoxication (HCC). Diagnoses of Chronic alcohol abuse and Thoracic compression fracture (HCC) were also pertinent to this visit.    Patient Condition:      Reason for Transfer:      Transfer Requirements: Facility     Space available and qualified personnel available for treatment as acknowledged by    Agreed to accept transfer and to provide appropriate medical treatment as acknowledged by          Appropriate medical records of the examination and treatment of the patient are provided at the time of transfer   STAFF INITIAL WHEN COMPLETED _______  Transfer will be performed by qualified personnel from    and appropriate transfer equipment as required, including the use of necessary and appropriate life support measures.    Provider Certification: I have examined the patient and explained the following risks and benefits of being transferred/refusing transfer to the patient/family:         Based on these reasonable risks and benefits to the patient and/or the unborn child(deepika), and based upon the information available at the time of the patient’s examination, I certify that the medical benefits reasonably to be expected from the provision of appropriate medical treatments at another medical facility outweigh the increasing risks, if any,  to the individual’s medical condition, and in the case of labor to the unborn child, from effecting the transfer.    X____________________________________________ DATE 05/15/25        TIME_______      ORIGINAL - SEND TO MEDICAL RECORDS   COPY - SEND WITH PATIENT DURING TRANSFER

## 2025-05-15 NOTE — ASSESSMENT & PLAN NOTE
"Lab Results   Component Value Date    HGBA1C 5.6 05/12/2025       No results for input(s): \"POCGLU\" in the last 72 hours.    Blood Sugar Average: Last 72 hrs:    Most recent hemoglobin A1c 5.6  We will continue maintenance with BMP, no need to check required blood glucose  Outpatient medications include Jardiance, will hold at this time while inpatient  No longer taking Tresiba  "
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
## 2025-05-15 NOTE — QUICK NOTE
As patient is having increased pain throughout the day with associated thoracic compression fractures, patient will require a pain regimen.  Pain management will have to be approached from a conservative viewpoint given patient's comorbidities of recent GI bleed/esophageal variceal bleed, alcohol use disorder, and portal hypertension.  Discussed with patient's while reviewing risk versus benefits and ultimately would prefer Tylenol over Toradol/ibuprofen and continue to trend LFTs closely.  Will also order low-dose oxycodone 2.5 mg for severe pain.  Patient may have additional low-dose 2.5 mg Oxy for breakthrough pain half hour after administrating initial dose.    Continued need for TLSO brace

## 2025-05-15 NOTE — TELEPHONE ENCOUNTER
5/15/25 - PT IN [facility] DETOX  6/4/25 2 WK HFU W/TSPINE XR *JB*    LUANA López  P Neurosurgical [facility]  Patient needs 2-week hospital follow-up with AP with thoracic spine x-ray
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
## 2025-05-15 NOTE — ASSESSMENT & PLAN NOTE
History of hepatic encephalopathy secondary to alcohol abuse liver cirrhosis  Not in current hepatic encephalopathy  Ammonia 50  Continue with lactulose and Xifaxan

## 2025-05-15 NOTE — ASSESSMENT & PLAN NOTE
CM/CRS involvement for aftercare planning and linkage to AUD treatment after discharge  We will discuss naltrexone with him probably tomorrow. It is noted on PDMP that he has had some opioid Rx's recently and has current spinal fractures. So, he will need to demonstrate no need for opioid pain medication for this prior to starting naltrexone.
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
## 2025-05-15 NOTE — TREATMENT PLAN
Reviewed upright thoracic spine x-ray which demonstrates grossly stable multiple thoracic fractures as well as stable spinal alignment.    No neurosurgical intervention warranted.  Continue conservative management with TLSO brace and pain control.    Neurosurgery will sign off, patient will follow-up in 2 weeks with repeat thoracic spine x-rays, call with any further questions or concerns.

## 2025-05-15 NOTE — DISCHARGE INSTR - AVS FIRST PAGE
Neurosurgery discharge instructions following spine fracture:     TLSO brace to be worn when out of bed of head of bed greater than 45 degrees.  May place brace on while sitting on edge of bed. May be removed for showering.   No bending, twisting or heavy lifting. No strenuous activities. No Driving.   Follow-up as scheduled in two weeks with repeat spine x-rays to be completed 2-3 days prior to visit.  Prescription has been entered electronically.      **Please notify MD immediately if you have increased back or leg pain. New numbness, tingling, weakness in your legs and/or bowel/bladder incontinence**
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
## 2025-05-15 NOTE — NURSING NOTE
Patient found sitting at the edge of the bed eating dinner. Not having symptoms of withdrawal at this time, but is having back pain. Updated provider of pain and waiting new orders.
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
## 2025-05-15 NOTE — ASSESSMENT & PLAN NOTE
Pt with a h/o of daily alcohol use   Drinks one 750 ml bottle of vodka daily  Without previous admission to the [facility] Medical Detox Unit   H/o withdrawal seizures  Agreed to naltrexone at this time  Withdrawal management as above  Initiate IVFs, IV thiamine, folic acid, and MVI  Consult to CRS   Consult case management/CRS for assistance with aftercare resources - pt interested in outpatient at this time

## 2025-05-16 PROBLEM — E87.6 HYPOKALEMIA: Status: ACTIVE | Noted: 2025-05-16

## 2025-05-16 LAB
ALBUMIN SERPL BCG-MCNC: 3.3 G/DL (ref 3.5–5)
ALP SERPL-CCNC: 112 U/L (ref 34–104)
ALT SERPL W P-5'-P-CCNC: 32 U/L (ref 7–52)
ANION GAP SERPL CALCULATED.3IONS-SCNC: 8 MMOL/L (ref 4–13)
AST SERPL W P-5'-P-CCNC: 61 U/L (ref 13–39)
ATRIAL RATE: 83 BPM
BILIRUB SERPL-MCNC: 1.44 MG/DL (ref 0.2–1)
BUN SERPL-MCNC: 7 MG/DL (ref 5–25)
CALCIUM ALBUM COR SERPL-MCNC: 8.6 MG/DL (ref 8.3–10.1)
CALCIUM SERPL-MCNC: 8 MG/DL (ref 8.4–10.2)
CHLORIDE SERPL-SCNC: 99 MMOL/L (ref 96–108)
CO2 SERPL-SCNC: 29 MMOL/L (ref 21–32)
CREAT SERPL-MCNC: 0.4 MG/DL (ref 0.6–1.3)
ERYTHROCYTE [DISTWIDTH] IN BLOOD BY AUTOMATED COUNT: 22.8 % (ref 11.6–15.1)
GFR SERPL CREATININE-BSD FRML MDRD: 136 ML/MIN/1.73SQ M
GLUCOSE SERPL-MCNC: 94 MG/DL (ref 65–140)
HCT VFR BLD AUTO: 31.2 % (ref 36.5–49.3)
HGB BLD-MCNC: 9.2 G/DL (ref 12–17)
MCH RBC QN AUTO: 24 PG (ref 26.8–34.3)
MCHC RBC AUTO-ENTMCNC: 29.5 G/DL (ref 31.4–37.4)
MCV RBC AUTO: 82 FL (ref 82–98)
P AXIS: 66 DEGREES
PLATELET # BLD AUTO: 55 THOUSANDS/UL (ref 149–390)
PMV BLD AUTO: 9.7 FL (ref 8.9–12.7)
POTASSIUM SERPL-SCNC: 3.2 MMOL/L (ref 3.5–5.3)
PR INTERVAL: 156 MS
PROT SERPL-MCNC: 6.1 G/DL (ref 6.4–8.4)
QRS AXIS: 61 DEGREES
QRSD INTERVAL: 116 MS
QT INTERVAL: 408 MS
QTC INTERVAL: 479 MS
RBC # BLD AUTO: 3.83 MILLION/UL (ref 3.88–5.62)
SODIUM SERPL-SCNC: 136 MMOL/L (ref 135–147)
T WAVE AXIS: 29 DEGREES
VENTRICULAR RATE: 83 BPM
WBC # BLD AUTO: 2.79 THOUSAND/UL (ref 4.31–10.16)

## 2025-05-16 PROCEDURE — 85027 COMPLETE CBC AUTOMATED: CPT

## 2025-05-16 PROCEDURE — 93010 ELECTROCARDIOGRAM REPORT: CPT | Performed by: INTERNAL MEDICINE

## 2025-05-16 PROCEDURE — 97163 PT EVAL HIGH COMPLEX 45 MIN: CPT

## 2025-05-16 PROCEDURE — 97167 OT EVAL HIGH COMPLEX 60 MIN: CPT

## 2025-05-16 PROCEDURE — 99233 SBSQ HOSP IP/OBS HIGH 50: CPT | Performed by: EMERGENCY MEDICINE

## 2025-05-16 PROCEDURE — 99232 SBSQ HOSP IP/OBS MODERATE 35: CPT | Performed by: HOSPITALIST

## 2025-05-16 PROCEDURE — 80053 COMPREHEN METABOLIC PANEL: CPT

## 2025-05-16 RX ORDER — OXYCODONE HYDROCHLORIDE 10 MG/1
10 TABLET ORAL EVERY 4 HOURS PRN
Refills: 0 | Status: DISCONTINUED | OUTPATIENT
Start: 2025-05-16 | End: 2025-05-17 | Stop reason: HOSPADM

## 2025-05-16 RX ORDER — POTASSIUM CHLORIDE 14.9 MG/ML
20 INJECTION INTRAVENOUS ONCE
Status: COMPLETED | OUTPATIENT
Start: 2025-05-16 | End: 2025-05-16

## 2025-05-16 RX ORDER — NICOTINE 21 MG/24HR
14 PATCH, TRANSDERMAL 24 HOURS TRANSDERMAL DAILY
Status: DISCONTINUED | OUTPATIENT
Start: 2025-05-16 | End: 2025-05-17 | Stop reason: HOSPADM

## 2025-05-16 RX ORDER — HYDROMORPHONE HCL/PF 1 MG/ML
0.5 SYRINGE (ML) INJECTION ONCE
Refills: 0 | Status: COMPLETED | OUTPATIENT
Start: 2025-05-16 | End: 2025-05-16

## 2025-05-16 RX ORDER — OXYCODONE HYDROCHLORIDE 5 MG/1
5 TABLET ORAL EVERY 4 HOURS PRN
Refills: 0 | Status: DISCONTINUED | OUTPATIENT
Start: 2025-05-16 | End: 2025-05-17 | Stop reason: HOSPADM

## 2025-05-16 RX ORDER — OXYCODONE HYDROCHLORIDE 10 MG/1
10 TABLET ORAL EVERY 4 HOURS PRN
Refills: 0 | Status: DISCONTINUED | OUTPATIENT
Start: 2025-05-16 | End: 2025-05-16

## 2025-05-16 RX ORDER — PHENOBARBITAL SODIUM 130 MG/ML
130 INJECTION, SOLUTION INTRAMUSCULAR; INTRAVENOUS ONCE
Status: COMPLETED | OUTPATIENT
Start: 2025-05-16 | End: 2025-05-16

## 2025-05-16 RX ORDER — PHENOBARBITAL 64.8 MG/1
64.8 TABLET ORAL ONCE
Status: COMPLETED | OUTPATIENT
Start: 2025-05-16 | End: 2025-05-16

## 2025-05-16 RX ORDER — OXYCODONE HYDROCHLORIDE 5 MG/1
5 TABLET ORAL EVERY 4 HOURS PRN
Refills: 0 | Status: DISCONTINUED | OUTPATIENT
Start: 2025-05-16 | End: 2025-05-16

## 2025-05-16 RX ORDER — HYDROMORPHONE HCL/PF 1 MG/ML
0.5 SYRINGE (ML) INJECTION EVERY 4 HOURS PRN
Refills: 0 | Status: DISCONTINUED | OUTPATIENT
Start: 2025-05-16 | End: 2025-05-17 | Stop reason: HOSPADM

## 2025-05-16 RX ADMIN — RIFAXIMIN 550 MG: 550 TABLET ORAL at 08:31

## 2025-05-16 RX ADMIN — GABAPENTIN 300 MG: 300 CAPSULE ORAL at 08:31

## 2025-05-16 RX ADMIN — LIDOCAINE 5% 1 PATCH: 700 PATCH TOPICAL at 08:36

## 2025-05-16 RX ADMIN — RIFAXIMIN 550 MG: 550 TABLET ORAL at 20:06

## 2025-05-16 RX ADMIN — CARVEDILOL 6.25 MG: 6.25 TABLET, FILM COATED ORAL at 15:37

## 2025-05-16 RX ADMIN — PHENOBARBITAL SODIUM 130 MG: 130 INJECTION INTRAMUSCULAR; INTRAVENOUS at 08:33

## 2025-05-16 RX ADMIN — OXYCODONE HYDROCHLORIDE 5 MG: 5 TABLET ORAL at 22:12

## 2025-05-16 RX ADMIN — LACTULOSE 30 G: 20 SOLUTION ORAL at 08:33

## 2025-05-16 RX ADMIN — PANTOPRAZOLE SODIUM 40 MG: 40 TABLET, DELAYED RELEASE ORAL at 05:04

## 2025-05-16 RX ADMIN — PANCRELIPASE 36000 UNITS: 120000; 24000; 76000 CAPSULE, DELAYED RELEASE PELLETS ORAL at 11:11

## 2025-05-16 RX ADMIN — OXYCODONE HYDROCHLORIDE 5 MG: 5 TABLET ORAL at 04:20

## 2025-05-16 RX ADMIN — PANCRELIPASE 36000 UNITS: 120000; 24000; 76000 CAPSULE, DELAYED RELEASE PELLETS ORAL at 08:43

## 2025-05-16 RX ADMIN — HYDROMORPHONE HYDROCHLORIDE 0.5 MG: 1 INJECTION, SOLUTION INTRAMUSCULAR; INTRAVENOUS; SUBCUTANEOUS at 17:24

## 2025-05-16 RX ADMIN — OXYCODONE HYDROCHLORIDE 10 MG: 10 TABLET ORAL at 12:11

## 2025-05-16 RX ADMIN — OXYCODONE HYDROCHLORIDE 5 MG: 5 TABLET ORAL at 15:37

## 2025-05-16 RX ADMIN — OXYCODONE HYDROCHLORIDE 5 MG: 5 TABLET ORAL at 08:31

## 2025-05-16 RX ADMIN — LACTULOSE 30 G: 20 SOLUTION ORAL at 17:09

## 2025-05-16 RX ADMIN — OXYCODONE HYDROCHLORIDE 5 MG: 5 TABLET ORAL at 00:18

## 2025-05-16 RX ADMIN — NICOTINE 7 MG: 7 PATCH, EXTENDED RELEASE TRANSDERMAL at 08:36

## 2025-05-16 RX ADMIN — SODIUM CHLORIDE 125 ML/HR: 0.9 INJECTION, SOLUTION INTRAVENOUS at 04:36

## 2025-05-16 RX ADMIN — CARVEDILOL 6.25 MG: 6.25 TABLET, FILM COATED ORAL at 08:31

## 2025-05-16 RX ADMIN — THIAMINE HYDROCHLORIDE: 100 INJECTION, SOLUTION INTRAMUSCULAR; INTRAVENOUS at 11:12

## 2025-05-16 RX ADMIN — PHENOBARBITAL SODIUM 130 MG: 130 INJECTION INTRAMUSCULAR; INTRAVENOUS at 00:30

## 2025-05-16 RX ADMIN — HYDROMORPHONE HYDROCHLORIDE 0.5 MG: 1 INJECTION, SOLUTION INTRAMUSCULAR; INTRAVENOUS; SUBCUTANEOUS at 11:20

## 2025-05-16 RX ADMIN — PHENOBARBITAL 64.8 MG: 64.8 TABLET ORAL at 14:37

## 2025-05-16 RX ADMIN — OXYCODONE HYDROCHLORIDE 10 MG: 10 TABLET ORAL at 20:05

## 2025-05-16 RX ADMIN — ATORVASTATIN CALCIUM 80 MG: 80 TABLET, FILM COATED ORAL at 16:31

## 2025-05-16 RX ADMIN — ACETAMINOPHEN 650 MG: 325 TABLET, FILM COATED ORAL at 13:17

## 2025-05-16 RX ADMIN — POTASSIUM CHLORIDE 20 MEQ: 14.9 INJECTION, SOLUTION INTRAVENOUS at 08:44

## 2025-05-16 RX ADMIN — NICOTINE 14 MG: 14 PATCH, EXTENDED RELEASE TRANSDERMAL at 20:26

## 2025-05-16 RX ADMIN — PHENOBARBITAL SODIUM 130 MG: 130 INJECTION INTRAMUSCULAR; INTRAVENOUS at 04:36

## 2025-05-16 RX ADMIN — METHOCARBAMOL TABLETS 500 MG: 500 TABLET, COATED ORAL at 10:59

## 2025-05-16 RX ADMIN — PANCRELIPASE 36000 UNITS: 120000; 24000; 76000 CAPSULE, DELAYED RELEASE PELLETS ORAL at 16:31

## 2025-05-16 NOTE — ASSESSMENT & PLAN NOTE
CM/CRS involvement for aftercare planning and linkage to AUD treatment after discharge  Receiving opioids for pain control secondary to vertebral fractures. Therefore, will hold on naltrexone for now.

## 2025-05-16 NOTE — PLAN OF CARE
Problem: PAIN - ADULT  Goal: Verbalizes/displays adequate comfort level or baseline comfort level  Description: Interventions:  - Encourage patient to monitor pain and request assistance  - Assess pain using appropriate pain scale  - Administer analgesics as ordered based on type and severity of pain and evaluate response  - Implement non-pharmacological measures as appropriate and evaluate response  - Consider cultural and social influences on pain and pain management  - Notify physician/advanced practitioner if interventions unsuccessful or patient reports new pain  - Educate patient/family on pain management process including their role and importance of  reporting pain   - Provide non-pharmacologic/complimentary pain relief interventions  Outcome: Progressing     Problem: INFECTION - ADULT  Goal: Absence or prevention of progression during hospitalization  Description: INTERVENTIONS:  - Assess and monitor for signs and symptoms of infection  - Monitor lab/diagnostic results  - Monitor all insertion sites, i.e. indwelling lines, tubes, and drains  - Monitor endotracheal if appropriate and nasal secretions for changes in amount and color  - McNeil appropriate cooling/warming therapies per order  - Administer medications as ordered  - Instruct and encourage patient and family to use good hand hygiene technique  - Identify and instruct in appropriate isolation precautions for identified infection/condition  Outcome: Progressing  Goal: Absence of fever/infection during neutropenic period  Description: INTERVENTIONS:  - Monitor WBC  - Perform strict hand hygiene  - Limit to healthy visitors only  - No plants, dried, fresh or silk flowers with lake in patient room  Outcome: Progressing     Problem: SAFETY ADULT  Goal: Patient will remain free of falls  Description: INTERVENTIONS:  - Educate patient/family on patient safety including physical limitations  - Instruct patient to call for assistance with activity   -  Consider consulting OT/PT to assist with strengthening/mobility based on AM PAC & JH-HLM score  - Consult OT/PT to assist with strengthening/mobility   - Keep Call bell within reach  - Keep bed low and locked with side rails adjusted as appropriate  - Keep care items and personal belongings within reach  - Initiate and maintain comfort rounds  - Make Fall Risk Sign visible to staff  Outcome: Progressing  Goal: Maintain or return to baseline ADL function  Description: INTERVENTIONS:  -  Assess patient's ability to carry out ADLs; assess patient's baseline for ADL function and identify physical deficits which impact ability to perform ADLs (bathing, care of mouth/teeth, toileting, grooming, dressing, etc.)  - Assess/evaluate cause of self-care deficits   - Assess range of motion  - Assess patient's mobility; develop plan if impaired  - Assess patient's need for assistive devices and provide as appropriate  - Encourage maximum independence but intervene and supervise when necessary  - Involve family in performance of ADLs  - Assess for home care needs following discharge   - Consider OT consult to assist with ADL evaluation and planning for discharge  - Provide patient education as appropriate  - Monitor functional capacity and physical performance, use of AM PAC & JH-HLM   - Monitor gait, balance and fatigue with ambulation    Outcome: Progressing  Goal: Maintains/Returns to pre admission functional level  Description: INTERVENTIONS:  - Perform AM-PAC 6 Click Basic Mobility/ Daily Activity assessment daily.  - Set and communicate daily mobility goal to care team and patient/family/caregiver.   - Collaborate with rehabilitation services on mobility goals if consulted  Outcome: Progressing     Problem: DISCHARGE PLANNING  Goal: Discharge to home or other facility with appropriate resources  Description: INTERVENTIONS:  - Identify barriers to discharge w/patient and caregiver  - Arrange for needed discharge resources and  transportation as appropriate  - Identify discharge learning needs (meds, wound care, etc.)  - Arrange for interpretive services to assist at discharge as needed  - Refer to Case Management Department for coordinating discharge planning if the patient needs post-hospital services based on physician/advanced practitioner order or complex needs related to functional status, cognitive ability, or social support system  Outcome: Progressing     Problem: Knowledge Deficit  Goal: Patient/family/caregiver demonstrates understanding of disease process, treatment plan, medications, and discharge instructions  Description: Complete learning assessment and assess knowledge base.  Interventions:  - Provide teaching at level of understanding  - Provide teaching via preferred learning methods  Outcome: Progressing

## 2025-05-16 NOTE — PLAN OF CARE
Problem: OCCUPATIONAL THERAPY ADULT  Goal: Performs self-care activities at highest level of function for planned discharge setting.  See evaluation for individualized goals.  Description: Treatment Interventions: ADL retraining, Functional transfer training, UE strengthening/ROM, Endurance training, Continued evaluation, Energy conservation, Activityengagement          See flowsheet documentation for full assessment, interventions and recommendations.   Outcome: Progressing  Note: Limitation: Decreased high-level ADLs, Decreased endurance, Decreased Safe judgement during ADL  Prognosis: Good  Assessment: Pt is a 50 y.o. male seen for OT evaluation s/p admit to [facility] on 5/15/2025 w/ Alcohol withdrawal syndrome, with unspecified complication (HCC), recent T2-T5 compression fx.  See medical history above for extensive list of comorbidities affecting pt's functional performance at time of assessment. Personal factors affecting Pt at time of IE include:behavioral pattern, difficulty performing IADLS , and health management . Upon evaluation: Pt requires supervision for functional ambulation including bathroom mobility and negotiation of small spaces, supervision for ADL transfers.  Pt requires Qian for ADLs in standing. Pt educated re: donning/doffing back brace, requires assist for appropriate placement and strap management. Pt educated re: spinal precautions and modified LB ADLs. Pt's primary barrier(s) at this time: decreased safety, endurance. The following deficits impact occupational performance: weakness, decreased strength, decreased balance, decreased tolerance, impaired problem solving, and decreased safety awareness. Pt to benefit from continued skilled OT services while in the hospital to address deficits as defined above and maximize level of functional independence w ADL's and functional mobility. Occupational performance areas to address include: bathing/shower, toilet hygiene, dressing, health  maintenance, functional mobility, and clothing management. From OT standpoint, recommendation at time of d/c would be minimum resource intensity.       Rehab Resource Intensity Level, OT: III (Minimum Resource Intensity)

## 2025-05-16 NOTE — PROGRESS NOTES
"Progress Note - Medical Toxicology   Name: Ashwin Wright 50 y.o. male I MRN: [id number]  Unit/Bed#: 5T DETOX 511-01 I Date of Admission: 5/15/2025   Date of Service: 5/16/2025 I Hospital Day: 1    Assessment & Plan  Alcohol withdrawal syndrome, with unspecified complication (HCC)  Withdrawal is adequately controlled with phenobarbital per SEWS. He has received 910mg thus far.   Will continue SEWS today. I anticipate he will be able to come off the protocol within the next 24 hours.   Alcohol use disorder, severe, dependence (HCC)  CM/CRS involvement for aftercare planning and linkage to AUD treatment after discharge  Receiving opioids for pain control secondary to vertebral fractures. Therefore, will hold on naltrexone for now.  Encounter for smoking cessation counseling    Controlled type 2 diabetes mellitus with hyperglycemia, with long-term current use of insulin (HCC)  Lab Results   Component Value Date    HGBA1C 5.6 05/12/2025       No results for input(s): \"POCGLU\" in the last 72 hours.    Blood Sugar Average: Last 72 hrs:      Hepatic encephalopathy (HCC)    Secondary esophageal varices without bleeding (HCC)    Thoracic compression fracture (HCC)      Reason for current consult: Alcohol withdrawal     Subjective   No acute events overnight. He c/o back pain mainly    Objective :  Temp:  [97.2 °F (36.2 °C)-98.5 °F (36.9 °C)] 97.6 °F (36.4 °C)  HR:  [66-95] 66  BP: (118-166)/(59-96) 162/92  Resp:  [12-18] 16  SpO2:  [90 %-100 %] 95 %  O2 Device: None (Room air)  Nasal Cannula O2 Flow Rate (L/min):  [3 L/min] 3 L/min      Intake/Output Summary (Last 24 hours) at 5/16/2025 0902  Last data filed at 5/16/2025 0840  Gross per 24 hour   Intake 960 ml   Output --   Net 960 ml       Physical Exam  Constitutional:       General: He is not in acute distress.     Appearance: Normal appearance.     Cardiovascular:      Rate and Rhythm: Normal rate and regular rhythm.   Pulmonary:      Effort: Pulmonary effort is normal. "     Neurological:      Mental Status: He is alert and oriented to person, place, and time.      Comments: Minimal intention tremor         Lab Results: I have reviewed the following results:CBC/BMP:   .     05/16/25  0441   WBC 2.79*   HGB 9.2*   HCT 31.2*   PLT 55*   SODIUM 136   K 3.2*   CL 99   CO2 29   BUN 7   CREATININE 0.40*   GLUC 94        Imaging Results Review: No pertinent imaging studies reviewed.  Other Study Results Review: No additional pertinent studies reviewed.    Administrative Statements   I have spent a total time of 20 minutes in caring for this patient on the day of the visit/encounter including Impressions, Reviewing/placing orders in the medical record (including tests, medications, and/or procedures), Obtaining or reviewing history  , and Communicating with other healthcare professionals .

## 2025-05-16 NOTE — CERTIFIED RECOVERY SPECIALIST
Certified  Note      Name: Ashwin Wright 50 y.o. male I MRN: [id number]  Unit/Bed#: 5T DETOX 511-01 I Date of Admission: 5/15/2025   Date of Service: 5/16/2025 I Hospital Day: 1    Summary of Contact   Patient in with CM at this time will add resources     Follow up: NA          Administrative Statements  I have spent a total time of 0 minutes in caring for this patient on the day of the visit/encounter.    Kelli Felder

## 2025-05-16 NOTE — UTILIZATION REVIEW
Initial Clinical Review    Pt initially presented to [facility] ED. Pt was transferred by EMS to [facility] for medically managed detox.    Admission: Date/Time/Statement:   Admission Orders (From admission, onward)       Ordered        05/15/25 1157  Inpatient Admission  Once                          Orders Placed This Encounter   Procedures    Inpatient Admission     Standing Status:   Standing     Number of Occurrences:   1     Level of Care:   Med Surg [16]     Estimated length of stay:   More than 2 Midnights     Certification:   I certify that inpatient services are medically necessary for this patient for a duration of greater than two midnights. See H&P and MD Progress Notes for additional information about the patient's course of treatment.       No chief complaint on file.      Initial Presentation: 50 y.o. male who presented to [facility]. Inpatient admission for evaluation and treatment of alcohol withdrawal syndrome. PMH of alcoholic cirrhosis, hepatic encephalopathy, esophageal varices with history of bleeds, alcohol dependency who presents with alcohol withdrawal. Presented w/ nausea, tremors, anxiety a . Exam: Tremors, resting comfortably, no tongue fasciculations, finger-to-nose positive . Plan: SEWS score 9 upon admission Administer 260 mg of phenobarbital as initial dose  Patient s/p fall last week wears TSLo brace, DM controlled on Jardiance continue med.History of esophageal varices with intermittent bleeding No active reports of melena or hematemesis Baseline hemoglobin between 9 and 11, currently 10.1 and at baseline Continue monitoring under protocol and administer phenobarbital as indicated Continuous pulse ox and telemetry monitoring  IVF, telemetry, continuous pulse ox, continue PTA meds, trend labs, replete electrolytes as needed; I&O, fall & seizure precautions. Toxicology consulted.     Anticipated Length of Stay: Patient will be admitted on an  inpatient basis with an anticipated length of stay of greater than 2 midnights secondary to alcohol withdrawal.     Toxicology: Presented w/ need for detox from alcohol. Serum ETOH: 361. Reports one 750 mL bottle of vodka clovis  daily, last drink on 05-15-25 @ 0400. Has prior rehab treatment for withdrawal. Reports no hx of withdrawal seizures. On exam, mild intention tremor . SEWS 9. Plan: SEWS monitoring w/ phenobarbital management, IV thiamine/folic acid supplement.       Date: 05-16-25       Day 2: Pt reports Less tremor and less anxiety,.  On exam, Continues to report worsening back pain. Continues to have withdrawal symptoms diaphoretic tremos and anxious. . SEWS 9-12. Plan: continue SEWS monitoring w/ phenobarbital management, IV thiamine/folic acid supplement, telemetry, continuous pulse ox, continue current meds, trend labs, replete electrolytes as needed. Received 910 phenobarbital since admission.continue to monitor blood sugars  Continuous pulse ox and telemetry monitoring.patient is currently receiving opioid medication for pain secondary to thoracic compression fracture      Certification Statement: The patient will continue to require additional inpatient hospital stay due to alcohol withdrawal         Scheduled Medications:  atorvastatin, 80 mg, Oral, Daily With Dinner  carvedilol, 6.25 mg, Oral, BID With Meals  folic acid 1 mg, thiamine (VITAMIN B1) 100 mg in sodium chloride 0.9 % 100 mL IV piggyback, , Intravenous, Daily  lactulose, 30 g, Oral, BID  lidocaine, 1 patch, Topical, Daily  nicotine, 7 mg, Transdermal, Daily  pancrelipase (Lip-Prot-Amyl), 36,000 Units, Oral, TID AC  pantoprazole, 40 mg, Oral, Early Morning  rifaximin, 550 mg, Oral, Q12H CARRIE      Continuous IV Infusions:  sodium chloride, 125 mL/hr, Intravenous, Continuous      PRN Meds:  acetaminophen, 650 mg, Oral, Q6H PRN  famotidine, 40 mg, Oral, HS PRN  methocarbamol, 500 mg, Oral, Q6H PRN  X 2  ondansetron, 4 mg, Intravenous, Q6H  PRN  oxyCODONE, 10 mg, Oral, Q4H PRN  X 1  oxyCODONE, 5 mg, Oral, Q4H PRN X 3  oxyCODONE 2.5 mg oral q 4 hrs  x 1     PHENobarbital injection 260 mg x 1   PHENobarbital injection 130 mg  X 5    ED Triage Vitals [05/15/25 1104]   Temperature Pulse Respirations Blood Pressure SpO2 Pain Score   97.5 °F (36.4 °C) 71 16 140/89 96 % 9     Weight (last 2 days)       Date/Time Weight    05/15/25 1104 80 (176.37)              Vital Signs (last 3 days)       Date/Time Temp Pulse Resp BP MAP (mmHg) SpO2 Calculated FIO2 (%) - Nasal Cannula Nasal Cannula O2 Flow Rate (L/min) O2 Device Patient Position - Orthostatic VS Rigo Coma Scale Score SEWS Total Score Pain    05/16/25 1317 -- -- -- -- -- -- -- -- -- -- -- -- 8    05/16/25 1211 -- -- -- -- -- -- -- -- -- -- -- -- 8    05/16/25 1120 -- -- -- -- -- -- -- -- -- -- -- -- 10 - Worst Possible Pain    05/16/25 1058 -- -- -- -- -- -- -- -- -- -- -- 0 --    05/16/25 1053 97.5 °F (36.4 °C) 71 16 164/90 114 93 % -- -- None (Room air) Lying -- -- --    05/16/25 1017 97.8 °F (36.6 °C) 80 16 151/91 111 97 % -- -- None (Room air) Lying -- -- --    05/16/25 0900 -- -- -- -- -- -- -- -- -- -- -- 0 --    05/16/25 0838 97.8 °F (36.6 °C) 80 16 151/91 -- 97 % -- -- -- -- -- -- --    05/16/25 0831 -- -- -- -- -- -- -- -- -- -- -- -- 9    05/16/25 0807 -- -- -- -- -- -- -- -- -- -- -- 12 --    05/16/25 0805 -- -- -- -- -- -- -- -- -- -- -- -- 9 05/16/25 0723 97.6 °F (36.4 °C) 66 16 162/92 115 95 % -- -- None (Room air) Lying -- -- --    05/16/25 0512 -- -- -- -- -- -- -- -- -- -- -- 0 --    05/16/25 0500 97.8 °F (36.6 °C) 70 16 166/88 -- 92 % -- -- None (Room air) -- -- -- --    05/16/25 0421 -- -- -- -- -- -- -- -- -- -- -- 9 --    05/16/25 0420 97.3 °F (36.3 °C) 70 16 161/96 -- 92 % -- -- None (Room air) -- -- -- 9 05/16/25 0100 98 °F (36.7 °C) 79 18 137/78 -- 91 % -- -- None (Room air) -- -- 0 --    05/16/25 0018 -- -- -- -- -- -- -- -- -- -- -- -- 8 05/16/25 0017 -- -- -- -- -- --  -- -- -- -- -- 9 --    05/16/25 0016 97.8 °F (36.6 °C) 86 18 157/89 -- 95 % -- -- None (Room air) -- -- -- --    05/15/25 2300 -- -- -- -- -- -- -- -- -- -- 15 -- --    05/15/25 2240 -- -- -- -- -- -- -- -- -- -- -- -- 8    05/15/25 2220 -- -- -- -- -- -- -- -- -- -- -- 0 --    05/15/25 2150 98.5 °F (36.9 °C) 95 18 118/59 -- 95 % -- -- None (Room air) Lying -- -- 10 - Worst Possible Pain    05/15/25 2021 -- -- -- -- -- -- -- -- -- -- -- 8 --    05/15/25 2020 98.5 °F (36.9 °C) 82 18 150/81 104 90 % -- -- None (Room air) -- -- -- --    05/15/25 1802 -- -- -- -- -- -- -- -- -- -- -- -- 9    05/15/25 1655 -- -- -- -- -- -- -- -- -- -- -- -- 9    05/15/25 1600 -- -- -- -- -- -- -- -- -- -- -- 0 --    05/15/25 1513 97.7 °F (36.5 °C) 78 16 133/71 91 98 % 32 3 L/min Nasal cannula Lying -- -- --    05/15/25 1418 97.2 °F (36.2 °C) 78 16 129/70 89 93 % 32 3 L/min Nasal cannula Lying -- -- --    05/15/25 1416 -- -- -- -- -- -- -- -- -- -- -- 8 --    05/15/25 1154 97.5 °F (36.4 °C) 71 16 140/89 -- 96 % -- -- -- -- -- -- --    05/15/25 1141 -- -- -- -- -- -- -- -- -- -- -- 9 --    05/15/25 1126 -- -- -- -- -- -- -- -- -- -- 15 -- --    05/15/25 1104 97.5 °F (36.4 °C) 71 16 140/89 106 96 % -- -- None (Room air) Lying -- -- 9            SEWS:   SEWS    Row Name 05/16/25 1058 05/16/25 0900 05/16/25 0807 05/16/25 0512 05/16/25 0421   Severity of Ethanol Withdrawal Scale (SEWS)   ANXIETY: Do you feel that something bad is about to happen to you right now? 0  -LL 0  -LL 3  -LL 0  -TS 3  -TS   NAUSEA and DRY HEAVES or VOMITING? 0  -LL 0  -LL 3  -LL 0  -TS 0  -TS   SWEATING: (includes moist palms, sweating now)? Score 0 or 2 0  -LL 0  -LL 0  -LL 0  -TS 0  -TS   TREMOR: with arms extended eyes closed? 0  -LL 0  -LL 0  -LL 0  -TS 0  -TS   AGITATION: Fidgety, restless, pacing? 0  -LL 0  -LL 3  -LL 0  -TS 3  -TS   DISORIENTATION: 0  -LL 0  -LL 0  -LL 0  -TS 0  -TS   HALLUCINATIONS: 0  -LL 0  -LL 0  -LL 0  -TS 0  -TS   VITAL SIGNS: ANY (Pulse  >110, Diastolic BP >90, Temp >99.6) 0  -LL 0  -LL 3  -LL 0  -TS 3  -TS   SEWS Total Score 0  -LL 0  -LL 12  -LL 0  -TS 9  -TS   Hobbs Agitation Sedation Scale (RASS)   Hobbs Agitation Sedation Scale (RASS) 0  -LL -2  -LL +1  -LL -2  -TS +1  -TS   Row Name 05/16/25 0100 05/16/25 0017 05/15/25 2220 05/15/25 2021 05/15/25 1600   Severity of Ethanol Withdrawal Scale (SEWS)   ANXIETY: Do you feel that something bad is about to happen to you right now? 0  -TS 3  -TS 0  -TS 3  -TS 0  -LB   NAUSEA and DRY HEAVES or VOMITING? 0  -TS 3  -TS 0  -TS 0  -TS 0  -LB   SWEATING: (includes moist palms, sweating now)? Score 0 or 2 0  -TS 0  -TS 0  -TS 0  -TS 0  -LB   TREMOR: with arms extended eyes closed? 0  -TS 0  -TS 0  -TS 2  -TS 0  -LB   AGITATION: Fidgety, restless, pacing? 0  -TS 3  -TS 0  -TS 3  -TS 0  -LB   DISORIENTATION: 0  -TS 0  -TS 0  -TS 0  -TS 0  -LB   HALLUCINATIONS: 0  -TS 0  -TS 0  -TS 0  -TS 0  -LB   VITAL SIGNS: ANY (Pulse >110, Diastolic BP >90, Temp >99.6) 0  -TS 0  -TS 0  -TS 0  -TS 0  -LB   SEWS Total Score 0  -TS 9  -TS 0  -TS 8  -TS 0  -LB   Hobbs Agitation Sedation Scale (RASS)   Hobbs Agitation Sedation Scale (RASS) -2  -TS +1  -TS 0  -TS +1  -TS -1  -LB     Row Name 05/15/25 1416 05/15/25 1141      Severity of Ethanol Withdrawal Scale (SEWS)   ANXIETY: Do you feel that something bad is about to happen to you right now? 3  -LB 3  -LB      NAUSEA and DRY HEAVES or VOMITING? 0  -LB 3  -LB      SWEATING: (includes moist palms, sweating now)? Score 0 or 2 0  -LB 0  -LB      TREMOR: with arms extended eyes closed? 2  -LB 0  -LB      AGITATION: Fidgety, restless, pacing? 3  -LB 3  -LB      DISORIENTATION: 0  -LB 0  -LB      HALLUCINATIONS: 0  -LB 0  -LB      VITAL SIGNS: ANY (Pulse >110, Diastolic BP >90, Temp >99.6) 0  -LB 0  -LB      SEWS Total Score 8  -LB 9  -LB                  Pertinent Labs/Diagnostic Test Results:   Radiology:  XR spine thoracic 3 vw   Final Interpretation by Priti HERNDON  MD Cintia (05/15 1648)      Redemonstrated superior endplate compression fractures of T2-T5.      Computerized Assisted Algorithm (CAA) may have been used to analyze all applicable images.         Workstation performed: [id number]         XR spine thoracic 3 vw    (Results Pending)     Cardiology:  No orders to display     GI:  No orders to display         Results from last 7 days   Lab Units 05/16/25  0441 05/15/25  0547   WBC Thousand/uL 2.79* 3.78*   HEMOGLOBIN g/dL 9.2* 10.1*   HEMATOCRIT % 31.2* 32.5*   PLATELETS Thousands/uL 55* 76*   TOTAL NEUT ABS Thousands/µL  --  1.18*         Results from last 7 days   Lab Units 05/16/25  0441 05/15/25  0547   SODIUM mmol/L 136 142   POTASSIUM mmol/L 3.2* 3.4*   CHLORIDE mmol/L 99 103   CO2 mmol/L 29 30   ANION GAP mmol/L 8 9   BUN mg/dL 7 7   CREATININE mg/dL 0.40* 0.43*   EGFR ml/min/1.73sq m 136 132   CALCIUM mg/dL 8.0* 7.8*     Results from last 7 days   Lab Units 05/16/25  0441 05/15/25  0605 05/15/25  0547   AST U/L 61*  --  81*   ALT U/L 32  --  37   ALK PHOS U/L 112*  --  104   TOTAL PROTEIN g/dL 6.1*  --  6.6   ALBUMIN g/dL 3.3*  --  3.5   TOTAL BILIRUBIN mg/dL 1.44*  --  0.71   AMMONIA umol/L  --  50  --          Results from last 7 days   Lab Units 05/16/25  0441 05/15/25  0547   GLUCOSE RANDOM mg/dL 94 121         Results from last 7 days   Lab Units 05/12/25  1129   HEMOGLOBIN A1C  5.6     Beta- Hydroxybutyrate   Date Value Ref Range Status   05/15/2025 0.07 (L) 0.20 - 0.60 mmol/L Final                      Results from last 7 days   Lab Units 05/15/25  0605   HS TNI 0HR ng/L 10                                 Results from last 7 days   Lab Units 05/15/25  0547   BNP pg/mL 10                     Results from last 7 days   Lab Units 05/15/25  0547   LIPASE u/L 40                 Results from last 7 days   Lab Units 05/12/25  1128   CREATININE UR mg/dL 166.1                 Results from last 7 days   Lab Units 05/15/25  0547   ETHANOL LVL mg/dL 361*                                      Past Medical History:   Diagnosis Date    Alcoholism (HCC)     Arthritis     Asthma     Chronic pain disorder     Chronic pancreatitis (HCC)     Cirrhosis (HCC)     early cirrhosis    GERD (gastroesophageal reflux disease)     History of COVID-19 01/2022    mild s/s    Hyperlipidemia     Hypertension     Liver abscess     Liver disease     Meniscus tear     left knee work injury last assessed 08/24/2016    Pancreatitis     Pneumonia     Rotator cuff tear     Stomach disorder     Chronic gastritus     Present on Admission:   Hepatic encephalopathy (HCC)   Secondary esophageal varices without bleeding (HCC)      Admitting Diagnosis: Alcohol intoxication (HCC) [F10.929]  Age/Sex: 50 y.o. male      SEWS PHENOBARBITAL PROTOCOL FOR ALCOHOL WITHDRAWAL  Admit patient to medical detox unit and continue supportive care and stabilization of acute ethanol withdrawal per medical toxicology/detox treatment pathway. Monitor ethanol withdrawal severity via the Severity of Ethanol Withdrawal Scale (SEWS) Q4 hours and then hourly if/when SEWS > 6. Treat withdrawal per pathway and reassess Q30-60 minutes.          Mild SEWS Score 1-6  Administer medications* (IV or PO; PO preferred):  If initial SEWS score: diazepam 10mg PO/IV x 1 AND phenobarbital 65 mg PO/IV x 1  If repeat SEWS score 1-6: phenobarbital 65 mg PO/IV q1 hour x 5 doses maximum   Reassessment:   SEWS q1 hour after each dose until SEWS 0 x 2 hours  VS q1 hours (until SEWS 0, then q4 hours)  Notify provider for bedside evaluation if 5-dose maximum is reached, RASS of -3 to -5, or SEWS score escalates to moderate or severe.   Moderate SEWS Score 7-12  Administer medications* (IV):  If initial SEWS score: diazepam 10mg IV x 1 AND phenobarbital 260 mg IV x 1  If repeat SEWS score 7-12 or score escalated from mild: phenobarbital 130 mg IV q30 minutes x 5 doses maximum   Reassessment:  SEWS q30 minutes after each dose until SEWS < 7 (then hourly until  SEWS 0 x 2 hours)  VS q30 minutes until SEWS < 7 (then hourly until SEWS 0, then q4 hours)  Notify provider for bedside evaluation if 5-dose maximum is reached, RASS of -3 to -5, or SEWS score escalates to severe.   Severe SEWS Score >= 13  Administer medications* (IV):  If initial SEWS score: Diazepam 10 mg IV x 1 AND phenobarbital 650 mg IV piggyback x 1 over 15-30 minutes  If repeat SEWS score >= 13 or score escalated from mild or moderate: phenobarbital 130 mg IV q30 minutes x 5 doses maximum   Reassessment:  SEWS q30 minutes after each dose until SEWS < 7 (then hourly until SEWS 0 x 2 hours)   VS q30 minutes until SEWS < 7 (then hourly until SEWS 0, then q4 hours)  Notify provider for bedside evaluation if 5-dose maximum is reached or RASS of -3 to -5   *Hold medications and notify provider if CNS depression, respirations < 10/min, or RASS of -3 to -5.        Network Utilization Review Department  ATTENTION: Please call with any questions or concerns to [phone] and carefully listen to the prompts so that you are directed to the right person. All voicemails are confidential.   For Discharge needs, contact Care Management DC Support Team at [phone] opt. 2  Send all requests for admission clinical reviews, approved or denied determinations and any other requests to dedicated fax number below belonging to the campus where the patient is receiving treatment. List of dedicated fax numbers for the Facilities:  FACILITY NAME UR FAX NUMBER   ADMISSION DENIALS (Administrative/Medical Necessity) [phone]   DISCHARGE SUPPORT TEAM (NETWORK) [phone]   PARENT CHILD HEALTH (Maternity/NICU/Pediatrics) [phone]   [facility] [phone]   [facility] [phone]   [facility] [phone]   [facility] [phone]   [facility] [phone]   [facility]  [phone]   [facility] [phone]   [facility] [phone]   [facility] [phone]   [facility] [phone]   [facility] [phone]   [facility] [phone]

## 2025-05-16 NOTE — ASSESSMENT & PLAN NOTE
"Lab Results   Component Value Date    HGBA1C 5.6 05/12/2025       No results for input(s): \"POCGLU\" in the last 72 hours.    Blood Sugar Average: Last 72 hrs:      "
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
## 2025-05-16 NOTE — OCCUPATIONAL THERAPY NOTE
Occupational Therapy Evaluation     Patient Name: Ashwin Wright  Today's Date: 5/16/2025  Problem List  Principal Problem:    Alcohol withdrawal syndrome, with unspecified complication (HCC)  Active Problems:    Encounter for smoking cessation counseling    Controlled type 2 diabetes mellitus with hyperglycemia, with long-term current use of insulin (HCC)    Hepatic encephalopathy (HCC)    Secondary esophageal varices without bleeding (HCC)    Alcohol use disorder, severe, dependence (HCC)    Thoracic compression fracture (HCC)    Hypokalemia    Past Medical History  Past Medical History:   Diagnosis Date    Alcoholism (HCC)     Arthritis     Asthma     Chronic pain disorder     Chronic pancreatitis (HCC)     Cirrhosis (HCC)     early cirrhosis    GERD (gastroesophageal reflux disease)     History of COVID-19 01/2022    mild s/s    Hyperlipidemia     Hypertension     Liver abscess     Liver disease     Meniscus tear     left knee work injury last assessed 08/24/2016    Pancreatitis     Pneumonia     Rotator cuff tear     Stomach disorder     Chronic gastritus     Past Surgical History  Past Surgical History:   Procedure Laterality Date    CELIAC PLEXUS BLOCK Bilateral 10/29/2018    Procedure: SPLANCHNIC NERVE BLOCK;  Surgeon: Ramiro Chinchilla MD;  [facility];  Service: Pain Management     CELIAC PLEXUS BLOCK Bilateral 01/10/2019    Procedure: SPLANCHNIC NERVE BLOCK;  Surgeon: Ramiro Chinchilla MD;  [facility];  Service: Pain Management     CHOLECYSTECTOMY      COLONOSCOPY      ESOPHAGOGASTRODUODENOSCOPY N/A 11/16/2017    Procedure: ESOPHAGOGASTRODUODENOSCOPY (EGD);  Surgeon: Ever Bonner MD;  [facility];  Service: Gastroenterology    ESOPHAGOGASTRODUODENOSCOPY N/A 04/19/2018    Procedure: ESOPHAGOGASTRODUODENOSCOPY (EGD);  Surgeon: Orestes Forrest MD;  [facility];  Service: Gastroenterology    ESOPHAGOGASTRODUODENOSCOPY N/A 05/10/2018    Procedure: ESOPHAGOGASTRODUODENOSCOPY  (EGD);  Surgeon: Vaibhav Matthew MD;  [facility];  Service: Gastroenterology    ESOPHAGUS SURGERY  01/17/2025    nubia galindo tear repain    HERNIA REPAIR Bilateral 02/03/2023    Procedure: REPAIR HERNIA INGUINAL, LAPAROSCOPIC,;  Surgeon: Juanjo Travis DO;  [facility];  Service: General    IR TEMPORARY DIALYSIS CATHETER PLACEMENT  02/28/2019    KNEE ARTHROSCOPY Bilateral     KNEE ARTHROSCOPY Right 2009    cibishino last assessed 08/24/2016    KNEE ARTHROSCOPY Right 07/01/2019    LIVER SURGERY      NERVE BLOCK Bilateral 05/29/2018    Procedure: SPLANCHNIC NERVE BLOCK;  Surgeon: Ramiro Chinchilla MD;  [facility];  Service: Pain Management     PANCREAS SURGERY      stents    PANCREATIC CYST EXCISION      WA INJECTION ANES LMBR/THRC PARAVERTBRL SYMPATHETIC Bilateral 12/28/2017    Procedure: SPLANCHNIC NERVE BLOCK;  Surgeon: Ramiro Chinchilla MD;  [facility];  Service: Pain Management     WA INJX ANES CELIAC PLEXUS W/WO RADIOLOGIC MONITRNG Bilateral 05/09/2017    Procedure: CELIAC PLEXUS BLOCK ;  Surgeon: Ramiro Chinchilla MD;  [facility];  Service: Pain Management     WA INJX ANES CELIAC PLEXUS W/WO RADIOLOGIC MONITRNG Bilateral 06/01/2017    Procedure: SPLANCHNIC NERVE BLOCK at T12;  Surgeon: Ramiro Chinchilla MD;  [facility];  Service: Pain Management     WA INJX ANES CELIAC PLEXUS W/WO RADIOLOGIC MONITRNG Bilateral 08/08/2017    Procedure: BILATERAL SPLANCHNIC NERVE BLOCK T12;  Surgeon: Ramiro Chinchilla MD;  [facility];  Service: Pain Management     WA INJX ANES CELIAC PLEXUS W/WO RADIOLOGIC MONITRNG Bilateral 04/18/2019    Procedure: BLOCK / INJECTION CELIAC PLEXUS;  Surgeon: Ramiro Chinchilla MD;  [facility];  Service: Pain Management     WA INJX ANES CELIAC PLEXUS W/WO RADIOLOGIC MONITRNG Bilateral 08/20/2019    Procedure: SPLANCHNIC NERVE BLOCK;  Surgeon: Ramiro Chinchilla MD;  [facility];  Service: Pain Management     WA INJX ANES  CELIAC PLEXUS W/WO RADIOLOGIC MONITRNG Bilateral 10/17/2019    Procedure: SPLANCHNIC NERVE BLOCK;  Surgeon: Ramiro Chinchilla MD;  [facility];  Service: Pain Management     FL LAPAROSCOPY SURG CHOLECYSTECTOMY N/A 02/14/2017    Procedure: LAPAROSCOPIC CHOLECYSTECTOMY, IOC, POSSIBLE OPEN.;  Surgeon: Suresh Lang MD;  [facility];  Service: General    FL SURGICAL ARTHROSCOPY SHOULDER W/ROTATOR CUFF RPR Left 06/09/2023    Procedure: Left Shoulder Arthroscopic Rotator Cuff Repair, Open Subpectoral Biceps Tenodesis, Acromioplasty, Extensive Debridement;  Surgeon: Toro Healy DO;  [facility];  Service: Orthopedics    ROTATOR CUFF REPAIR Right     SHOULDER ARTHROSCOPY      SHOULDER ARTHROSCOPY Right     SHOULDER SURGERY      TENDON REPAIR  2015    Right shoulder             05/16/25 1447   OT Last Visit   OT Visit Date 05/16/25   Note Type   Note type Evaluation   Pain Assessment   Pain Assessment Tool 0-10   Pain Score 7   Restrictions/Precautions   Weight Bearing Precautions Per Order No   Braces or Orthoses TLSO   Other Precautions Spinal precautions;Chair Alarm;Bed Alarm;Fall Risk   Home Living   Type of Home House   Home Layout Two level;Bed/bath upstairs;1/2 bath on main level   Bathroom Shower/Tub Tub/shower unit   Bathroom Toilet Standard   Home Equipment   (none)   Prior Function   Level of [place] Independent with ADLs;Independent with IADLS   Lives With Spouse   Receives Help From Family   Falls in the last 6 months 1 to 4   ADL   Grooming Assistance 5  Supervision/Setup   UB Bathing Assistance 5  Supervision/Setup   LB Bathing Assistance 4  Minimal Assistance   UB Dressing Assistance 5  Supervision/Setup   LB Dressing Assistance 4  Minimal Assistance   Toileting Assistance  5  Supervision/Setup   Toileting Deficit Clothing management up;Clothing management down;Perineal hygiene   Transfers   Sit to Stand 5  Supervision   Stand to Sit 5  Supervision   Functional Mobility    Functional Mobility   (CGA)   Balance   Static Sitting Fair +   Dynamic Sitting Fair   Static Standing Fair -   Dynamic Standing Fair -   Ambulatory Fair -   Activity Tolerance   Activity Tolerance Patient tolerated treatment well   RUE Assessment   RUE Assessment WFL   LUE Assessment   LUE Assessment WFL   Sensation   Light Touch No apparent deficits   Cognition   Arousal/Participation Alert;Cooperative   Attention Attends with cues to redirect   Orientation Level Oriented X4   Memory Decreased recall of recent events   Assessment   Limitation Decreased high-level ADLs;Decreased endurance;Decreased Safe judgement during ADL   Prognosis Good   Assessment   Pt is a 50 y.o. male seen for OT evaluation s/p admit to [facility] on 5/15/2025 w/ Alcohol withdrawal syndrome, with unspecified complication (HCC), recent T2-T5 compression fx.  See medical history above for extensive list of comorbidities affecting pt's functional performance at time of assessment. Personal factors affecting Pt at time of IE include:behavioral pattern, difficulty performing IADLS , and health management . Upon evaluation: Pt requires supervision for functional ambulation including bathroom mobility and negotiation of small spaces, supervision for ADL transfers.  Pt requires Qian for ADLs in standing. Pt educated re: donning/doffing back brace, requires assist for appropriate placement and strap management. Pt educated re: spinal precautions and modified LB ADLs. Pt's primary barrier(s) at this time: decreased safety, endurance. The following deficits impact occupational performance: weakness, decreased strength, decreased balance, decreased tolerance, impaired problem solving, and decreased safety awareness. Pt to benefit from continued skilled OT services while in the hospital to address deficits as defined above and maximize level of functional independence w ADL's and functional mobility. Occupational performance areas to address include:  bathing/shower, toilet hygiene, dressing, health maintenance, functional mobility, and clothing management. From OT standpoint, recommendation at time of d/c would be minimum resource intensity.       Goals   STG Time Frame   (1 week)   Short Term Goals Pt will complete functional ambulation Rivas/I.   Pt will complete LB ADLs Rivas/I.   Pt will complete toilet hygiene Rivas/I.   Pt will complete brace management Rivas/I.    Plan   Treatment Interventions ADL retraining;Functional transfer training;UE strengthening/ROM;Endurance training;Continued evaluation;Energy conservation;Activityengagement   Goal Expiration Date 05/23/25   OT Frequency 2-3x/wk   Discharge Recommendation   Rehab Resource Intensity Level, OT III (Minimum Resource Intensity)   AM-PAC Daily Activity Inpatient   Lower Body Dressing 3   Bathing 3   Toileting 3   Upper Body Dressing 4   Grooming 4   Eating 4   Daily Activity Raw Score 21   Daily Activity Standardized Score (Calc for Raw Score >=11) 44.27

## 2025-05-16 NOTE — PHYSICAL THERAPY NOTE
Physical Therapy Evaluation    Patient's Name: Ashwin Wright    Admitting Diagnosis  Alcohol intoxication (HCC) [F10.929]    Problem List  Problem List[1]    Past Medical History  Past Medical History:   Diagnosis Date    Alcoholism (HCC)     Arthritis     Asthma     Chronic pain disorder     Chronic pancreatitis (HCC)     Cirrhosis (HCC)     early cirrhosis    GERD (gastroesophageal reflux disease)     History of COVID-19 01/2022    mild s/s    Hyperlipidemia     Hypertension     Liver abscess     Liver disease     Meniscus tear     left knee work injury last assessed 08/24/2016    Pancreatitis     Pneumonia     Rotator cuff tear     Stomach disorder     Chronic gastritus       Past Surgical History  Past Surgical History:   Procedure Laterality Date    CELIAC PLEXUS BLOCK Bilateral 10/29/2018    Procedure: SPLANCHNIC NERVE BLOCK;  Surgeon: Ramiro Chinchilla MD;  [facility];  Service: Pain Management     CELIAC PLEXUS BLOCK Bilateral 01/10/2019    Procedure: SPLANCHNIC NERVE BLOCK;  Surgeon: Ramiro Chinchilla MD;  [facility];  Service: Pain Management     CHOLECYSTECTOMY      COLONOSCOPY      ESOPHAGOGASTRODUODENOSCOPY N/A 11/16/2017    Procedure: ESOPHAGOGASTRODUODENOSCOPY (EGD);  Surgeon: Ever Bonner MD;  [facility];  Service: Gastroenterology    ESOPHAGOGASTRODUODENOSCOPY N/A 04/19/2018    Procedure: ESOPHAGOGASTRODUODENOSCOPY (EGD);  Surgeon: Orestes Forrest MD;  [facility];  Service: Gastroenterology    ESOPHAGOGASTRODUODENOSCOPY N/A 05/10/2018    Procedure: ESOPHAGOGASTRODUODENOSCOPY (EGD);  Surgeon: Vaibhav Matthew MD;  [facility];  Service: Gastroenterology    ESOPHAGUS SURGERY  01/17/2025    nubia galindo tear repain    HERNIA REPAIR Bilateral 02/03/2023    Procedure: REPAIR HERNIA INGUINAL, LAPAROSCOPIC,;  Surgeon: Juanjo Travis DO;  [facility];  Service: General    IR TEMPORARY DIALYSIS CATHETER PLACEMENT  02/28/2019    KNEE ARTHROSCOPY  Bilateral     KNEE ARTHROSCOPY Right 2009    cibishino last assessed 08/24/2016    KNEE ARTHROSCOPY Right 07/01/2019    LIVER SURGERY      NERVE BLOCK Bilateral 05/29/2018    Procedure: SPLANCHNIC NERVE BLOCK;  Surgeon: Ramiro Chinchilla MD;  [facility];  Service: Pain Management     PANCREAS SURGERY      stents    PANCREATIC CYST EXCISION      HI INJECTION ANES LMBR/THRC PARAVERTBRL SYMPATHETIC Bilateral 12/28/2017    Procedure: SPLANCHNIC NERVE BLOCK;  Surgeon: Ramiro Chinchilla MD;  [facility];  Service: Pain Management     HI INJX ANES CELIAC PLEXUS W/WO RADIOLOGIC MONITRNG Bilateral 05/09/2017    Procedure: CELIAC PLEXUS BLOCK ;  Surgeon: Ramiro Chinchilla MD;  [facility];  Service: Pain Management     HI INJX ANES CELIAC PLEXUS W/WO RADIOLOGIC MONITRNG Bilateral 06/01/2017    Procedure: SPLANCHNIC NERVE BLOCK at T12;  Surgeon: Ramiro Chinchilla MD;  [facility];  Service: Pain Management     HI INJX ANES CELIAC PLEXUS W/WO RADIOLOGIC MONITRNG Bilateral 08/08/2017    Procedure: BILATERAL SPLANCHNIC NERVE BLOCK T12;  Surgeon: Ramiro Chinchilla MD;  [facility];  Service: Pain Management     HI INJX ANES CELIAC PLEXUS W/WO RADIOLOGIC MONITRNG Bilateral 04/18/2019    Procedure: BLOCK / INJECTION CELIAC PLEXUS;  Surgeon: Ramiro Chinchilla MD;  [facility];  Service: Pain Management     HI INJX ANES CELIAC PLEXUS W/WO RADIOLOGIC MONITRNG Bilateral 08/20/2019    Procedure: SPLANCHNIC NERVE BLOCK;  Surgeon: Ramiro Chinchilla MD;  [facility];  Service: Pain Management     HI INJX ANES CELIAC PLEXUS W/WO RADIOLOGIC MONITRNG Bilateral 10/17/2019    Procedure: SPLANCHNIC NERVE BLOCK;  Surgeon: Ramiro Chinchilla MD;  [facility];  Service: Pain Management     HI LAPAROSCOPY SURG CHOLECYSTECTOMY N/A 02/14/2017    Procedure: LAPAROSCOPIC CHOLECYSTECTOMY, IOC, POSSIBLE OPEN.;  Surgeon: Suresh Lang MD;  [facility];  Service: General    HI SURGICAL ARTHROSCOPY  SHOULDER W/ROTATOR CUFF RPR Left 06/09/2023    Procedure: Left Shoulder Arthroscopic Rotator Cuff Repair, Open Subpectoral Biceps Tenodesis, Acromioplasty, Extensive Debridement;  Surgeon: Toro Healy DO;  [facility];  Service: Orthopedics    ROTATOR CUFF REPAIR Right     SHOULDER ARTHROSCOPY      SHOULDER ARTHROSCOPY Right     SHOULDER SURGERY      TENDON REPAIR  2015    Right shoulder       Recent Imaging  XR spine thoracic 3 vw   Final Result by Priti Chamberlain MD (05/15 1648)      Redemonstrated superior endplate compression fractures of T2-T5.      Computerized Assisted Algorithm (CAA) may have been used to analyze all applicable images.         Workstation performed: [id number]         XR spine thoracic 3 vw    (Results Pending)       Recent Vital Signs  Vitals:    05/16/25 0838 05/16/25 1017 05/16/25 1053 05/16/25 1509   BP: 151/91 151/91 164/90 146/84   BP Location:  Left arm Left arm Left arm   Pulse: 80 80 71 68   Resp: 16 16 16 16   Temp: 97.8 °F (36.6 °C) 97.8 °F (36.6 °C) 97.5 °F (36.4 °C) (!) 97.4 °F (36.3 °C)   TempSrc: Temporal Temporal Temporal Temporal   SpO2: 97% 97% 93% 92%   Weight:       Height:            05/16/25 1310   PT Last Visit   PT Visit Date 05/16/25   Note Type   Note type Evaluation   Pain Assessment   Pain Assessment Tool 0-10   Pain Score 8   Pain Location/Orientation Location: Back   Restrictions/Precautions   Weight Bearing Precautions Per Order No   Braces or Orthoses TLSO   Other Precautions Spinal precautions;Pain   Home Living   Type of Home House   Home Layout Two level;Bed/bath upstairs;1/2 bath on main level   Bathroom Shower/Tub Tub/shower unit   Bathroom Toilet Standard   Prior Function   Level of [place] Independent with functional mobility;Independent with ADLs   Lives With Spouse   Receives Help From Family   Falls in the last 6 months 1 to 4   General   Family/Caregiver Present No   Cognition   Overall Cognitive Status WFL    Arousal/Participation Alert   Attention Within functional limits   Orientation Level Oriented X4   Memory Within functional limits   Following Commands Follows all commands and directions without difficulty   RLE Assessment   RLE Assessment   (4/5)   LLE Assessment   LLE Assessment   (4/5)   Light Touch   RLE Light Touch Impaired   LLE Light Touch Impaired   Bed Mobility   Supine to Sit 6  Modified independent   Sit to Supine 6  Modified independent   Transfers   Sit to Stand 5  Supervision   Stand to Sit 5  Supervision   Ambulation/Elevation   Gait pattern Step through pattern;Decreased toe off;Decreased heel strike;Excessively slow;Short stride;Decreased foot clearance   Gait Assistance 5  Supervision   Additional items Verbal cues   Assistive Device None   Distance 200ft   Balance   Static Sitting Fair +   Dynamic Sitting Fair   Static Standing Fair -   Dynamic Standing Fair -   Ambulatory Fair -   Endurance Deficit   Endurance Deficit Yes   Endurance Deficit Description back pain   Activity Tolerance   Activity Tolerance Patient tolerated treatment well   Medical Staff Made Aware spoke to CM   Nurse Made Aware spoke to RN   Assessment   Prognosis Good   Problem List Decreased strength;Decreased endurance;Decreased range of motion;Impaired balance;Decreased mobility;Decreased coordination;Impaired sensation;Pain;Orthopedic restrictions   Barriers to Discharge Inaccessible home environment;Decreased caregiver support   Goals   Patient Goals to go home soon   Discharge Recommendation   Rehab Resource Intensity Level, PT III (Minimum Resource Intensity)   AM-PAC Basic Mobility Inpatient   Turning in Flat Bed Without Bedrails 4   Lying on Back to Sitting on Edge of Flat Bed Without Bedrails 4   Moving Bed to Chair 4   Standing Up From Chair Using Arms 4   Walk in Room 4   Climb 3-5 Stairs With Railing 3   Basic Mobility Inpatient Raw Score 23   Basic Mobility Standardized Score 50.88   [facility] Level Of  Mobility   -[facility] Goal 7: Walk 25 feet or more   -HLM Achieved 7: Walk 25 feet or more   End of Consult   Patient Position at End of Consult Bedside chair;All needs within reach         ASSESSMENT                                                                                                                     Ashwin Wright is a 50 y.o. male admitted to [facility] on 5/15/2025 for Alcohol withdrawal syndrome, with unspecified complication (HCC). Pt  has a past medical history of Alcoholism (HCC), Arthritis, Asthma, Chronic pain disorder, Chronic pancreatitis (HCC), Cirrhosis (HCC), GERD (gastroesophageal reflux disease), History of COVID-19 (01/2022), Hyperlipidemia, Hypertension, Liver abscess, Liver disease, Meniscus tear, Pancreatitis, Pneumonia, Rotator cuff tear, and Stomach disorder.. PT was consulted and pt was seen on 5/16/2025 for mobility assessment and d/c planning.  Impairments limiting pt at this time include decreased ROM, impaired balance, decreased endurance, decreased coordination, new onset of impairment of functional mobility, decreased ADLS, decreased IADLS, pain, decreased activity tolerance, decreased sensation, and decreased strength. Pt is currently functioning at a modified independent assistance level for bed mobility, supervision assistance x1 level for transfers, supervision assistance x1 level for ambulation with no assistive device. The patient's -[facility] Basic Mobility Inpatient Short Form Raw Score is 23. A Raw score of greater than 16 suggests the patient may benefit from discharge to home. Please also refer to the recommendation of the Physical Therapist for safe discharge planning.    Recommendations                                                                                                                DME: None    Discharge Disposition:  Home with Outpatient Physical Therapy       Ever Bacon PT, DPT         [1]   Patient Active Problem List  Diagnosis    Pancreatic  lesion    Renal mass    Encounter for smoking cessation counseling    Leukocytosis    RBBB    Epigastric pain    Generalized abdominal pain    Abnormal transaminases    Acute on chronic pancreatitis (HCC)    Leukopenia    Hypertriglyceridemia    Esophagitis    Chronic pancreatitis (HCC)    Chronic gastritis without bleeding    GERD (gastroesophageal reflux disease)    Hyponatremia    Elevated blood pressure reading    Upper abdominal pain    Hypertension    Pancreatitis, unspecified pancreatitis type    Abdominal pain, epigastric    Change in bowel habits    Musculoskeletal neck pain    Prediabetes    Liver abscess    Cervical spondylosis    Tremor of both outstretched hands    Tremor, essential    Right groin pain    Non-recurrent bilateral inguinal hernia without obstruction or gangrene    Constipation    Severe protein-calorie malnutrition (HCC)    Incomplete tear of left rotator cuff, unspecified whether traumatic    Controlled type 2 diabetes mellitus with hyperglycemia, with long-term current use of insulin (HCC)    Memory difficulties    Cirrhosis of liver without ascites, unspecified hepatic cirrhosis type (HCC)    Hepatic encephalopathy (HCC)    Secondary esophageal varices without bleeding (HCC)    Alcohol withdrawal syndrome, with unspecified complication (HCC)    Alcohol use disorder, severe, dependence (HCC)    Thoracic compression fracture (HCC)    Hypokalemia

## 2025-05-16 NOTE — DISCHARGE INSTR - OTHER ORDERS
Kelli Felder   Certified     [facility], [place] and [facility]  [phone]  Monday-Friday 6:45am-3:15pm     AA meeting guide   https://www.aa.org/find-aa    AA Phone apps:   Meeting Guide  Everything AA  In the Rooms    AA/24 hour hotline   [phone]    SMART Recovery Meetings    Self Management and Recovery Training (SMART)  SMART Recovery is an evidenced-informed recovery method grounded in Rational Emotive Behavioral Therapy (REBT) and Cognitive Behavioral Therapy (CBT), that supports people with substance dependencies or problem behaviors to:  Build and maintain motivation  [place] with urges and cravings  Manage thoughts, feelings and behaviors  Live a balanced life    Find meetings and tools: https://AR LLC.org/                  DRUG AND ALCOHOL RESOURCES IN [facility]    If you have health insurance, including medical assistance, there should be a phone number on your insurance card that you can call to find out how to access services. The card may say, “For Behavioral Health Services” or “For Drug and Alcohol Services” or “For Substance Abuse Services” call the number provided. Or call [place] Get Help Now at [phone]    [facility] Drug and Alcohol  For information on how to access Drug and Alcohol treatment services please contact:  After hours 24/7 number: Critical access hospital at [phone]  During regular business hours call:   [facility] Toll Free: [phone]   [facility]: [phone]  [facility] Outpatient: [phone]  [facility] [phone]    [facility] National Helpline  Confidential free help, from public health agencies, to find substance use treatment and information.  1-879.518.4617    12 step Meetings      AA [facility]   HOTLINE [phone]  [facility] hotline: [phone]  AA meeting list can  be found at [email or web address]  NA meeting list can be found at [email or web address]    Outpatient Drug & Alcohol Treatment   The [facility] currently operates an outpatient treatment unit:  [facility] in [place], [place] as a functional unit of the [facility]  The Outpatient treatment units are licensed by the [place] Department of Drug & Alcohol Programs to provide individual and group counseling for those with substance abuse and dependency problems. The clinical staff is experienced in a variety of therapeutic modalities and provides treatment that is individualized to meet the particular needs of each person. These units are drug-free treatment programs.  The [facility] accepts most major healthcare insurance coverage plans, [place] Medical Assistance and in those cases where the consumer has no third party healthcare coverage, a liberal sliding fee schedule is utilized. The length of service and type of outpatient service provided is based on the results of the Level of Care Assessment            There are currently three treatment protocols available:  Outpatient  Intensive Outpatient  Contracted services for Partial Hospitalization  Therapy is provided in both Individual and Group counseling formats. The Outpatient department offers individual counseling for the family members of substance abusers to address co-dependent and enabling behaviors.    Outpatient treatment services in [facility] are purchased through a fee-for-service subcontract with:  [place] [facility]  [address] [place] [address]   Phone: [phone]    [facility] Outpatient  [address]  [place] [address]  Phone: [phone]  Fax: [phone]     Outpatient treatment services in [facility] are purchased through fee-for-service subcontracts with:  [facility]   [address] [place] [address]  [address]  [place], [place] [address]  Phone: [phone]  [facility] Outpatient  [address]  nancy Souza [address]  Phone: [phone]  Fax: [phone]   Outpatient treatment services are also available to [facility] residents in our [facility] Office.24/7 Mental Health Crisis Hotline  [facility] Crisis Line  [phone]  [facility] Toll Free: [phone]

## 2025-05-16 NOTE — PROGRESS NOTES
05/16/25 1148   Referral Data   Referral Name Self   Referral Reason Drug/Alcohol Abuse   [facility]   [facility]   Readmission Root Cause   30 Day Readmission No   Patient Information   Mental Status Alert   Primary Caregiver Self   Support System Immediate family   Catholic/Cultural Requests Pentecostalism   Legal Information   Legal Issues Pending legal issues related to an issue with a [facility]   Health Care Proxy Appointed No   Activities of Daily Living Prior to Admission   Functional Status Independent   Assistive Device Other (Comment)  (Pt just given a chest brace by PT to help with recent fracture)   Living Arrangement House   Ambulation Independent   Access to Firearms   Access to Firearms No   Income Information   Income Source Employed   Means of Transportation   Means of Transport to Appts: Drives Self        05/16/25 1149   Patient Intake   Special Needs None   Living Arrangement House;Lives with someone   Can patient return home? Yes   Address to be Discharge to: [address] [place] [address] [address] [id number]   Patient's Telephone Number [phone]   Access to Firearms No   Type of Work , by was out on leave since January due to varices   Work History Full-time   School Name HS diploma   Admission Status   [facility]   Patient History   Presenting Problems Alcohol use d/o   Treatment History Pt denies any prior residential or outpatient OS[facility]DO treatment, denies any prior  treatment   Currently in Treatment No   Community Agency Supports None   Medical Problems Thoracic compression fracture, type II diabetes, hepatic encephalopathy, esophageal varices w/o bleeding   Legal Issues Pending legal issues related to an issue with a    Probation/ Name (if applicable) n/a   Substance Abuse Yes (See BH History section for detail)   Crisis Info   Release of Information Signed Yes        05/16/25 1151   Substance Abuse Addendum  Details   History of Withdrawal Symptoms Denies past symptoms   Medical Complications Thoracic compression fracture, type II diabetes, hepatic encephalopathy, esophageal varices w/o bleeding   Sober Supports son, wife was hospitalized recently related to her alcohol use as well and pt identifies her as a support   Present Treatment None current   Substance Abuse Treatment Hx Denies past history   ASAM Level & Criteria Pt meets for 3.5 LOC, declining this LOC due to having responsibilities/bills to pay at home, open to engaging in an outpatient LOC, unsure of a provider at this time wants to discuss this with wife   Stage of Change   Stage of Change Contemplation

## 2025-05-16 NOTE — UTILIZATION REVIEW
NOTIFICATION OF INPATIENT MEDICAL ADMISSION   AUTHORIZATION REQUEST   SERVICING FACILITY:   [facility]  [address] [place] [address]  Tax ID: [id number]  NPI: [id number] ATTENDING PROVIDER:  Attending Name and NPI#: Wander Alberto Do [[id number]]  Address: [address] [place] [address]  Phone: [phone]     ADMISSION INFORMATION:  Place of Service: Inpatient [facility]  Place of Service Code: 21  Inpatient Admission Date/Time: 5/15/25 10:45 AM  Discharge Date/Time: No discharge date for patient encounter.  Admitting Diagnosis Code/Description:  Alcohol intoxication (HCC) [F10.929]     UTILIZATION REVIEW CONTACT:  Kallie Cho, Utilization   Network Utilization Review Department  Phone: [phone] Fax: [phone]  Email: [email or web address]  Contact for approvals/pending authorizations, clinical reviews, and discharge.     PHYSICIAN ADVISORY SERVICES:  Medical Necessity Denial & Kzis-dv-Ywcq Review  Phone: [phone]  Fax: [phone]  Email: [email or web address]     DISCHARGE SUPPORT TEAM:  For Patients Discharge Needs & Updates  Phone: [phone] opt. 2 Fax: [phone]  Email: [email or web address]
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
## 2025-05-16 NOTE — ASSESSMENT & PLAN NOTE
Pt with a h/o of daily alcohol use   Drinks one 750 ml bottle of vodka daily  Without previous admission to the [facility] Detox [facility]   H/o withdrawal seizures  Interested in naltrexone  However, patient is currently receiving opioid medication for pain secondary to thoracic compression fracture   Withdrawal management as above  Continue thiamine, folic acid, and MVI  Consult to CRS   Consult case management/CRS for assistance with aftercare resources - pt interested in outpatient at this time

## 2025-05-16 NOTE — ASSESSMENT & PLAN NOTE
Withdrawal is adequately controlled with phenobarbital per JESSICA. He has received 910mg thus far.   Will continue SEWS today. I anticipate he will be able to come off the protocol within the next 24 hours.

## 2025-05-16 NOTE — ASSESSMENT & PLAN NOTE
Last drink 4:30 AM 5/15  Serum alcohol 361 in the ED  Received 2 mg of lorazepam and 10 mg of Valium in the ED PTA  Without history of seizures  Toxicology consulted; appreciate recommendations   Continue SEWS protocol for medical management of alcohol withdrawal  Total phenobarbital: 910 mg of phenobarbital  Continue SEWS and dose phenobarbital as indicated   Continue monitoring under protocol and administer phenobarbital as indicated  Continuous pulse ox and telemetry monitoring

## 2025-05-16 NOTE — PLAN OF CARE
Problem: PAIN - ADULT  Goal: Verbalizes/displays adequate comfort level or baseline comfort level  Description: Interventions:  - Encourage patient to monitor pain and request assistance  - Assess pain using appropriate pain scale  - Administer analgesics as ordered based on type and severity of pain and evaluate response  - Implement non-pharmacological measures as appropriate and evaluate response  - Consider cultural and social influences on pain and pain management  - Notify physician/advanced practitioner if interventions unsuccessful or patient reports new pain  - Educate patient/family on pain management process including their role and importance of  reporting pain   - Provide non-pharmacologic/complimentary pain relief interventions  Outcome: Progressing     Problem: SUBSTANCE USE/ABUSE  Goal: By discharge, will develop insight into their chemical dependency and sustain motivation to continue in recovery  Description: INTERVENTIONS:  - Attends all daily group sessions and scheduled AA groups  - Actively practices coping skills through participation in the therapeutic community and adherence to program rules  - Reviews and completes assignments from individual treatment plan  - Assist patient development of understanding of their personal cycle of addiction and relapse triggers  Outcome: Progressing  Goal: By discharge, patient will have ongoing treatment plan addressing chemical dependency  Description: INTERVENTIONS:  - Assist patient with resources and/or appointments for ongoing recovery based living  Outcome: Progressing

## 2025-05-17 VITALS
SYSTOLIC BLOOD PRESSURE: 168 MMHG | HEART RATE: 69 BPM | OXYGEN SATURATION: 97 % | BODY MASS INDEX: 24.69 KG/M2 | RESPIRATION RATE: 16 BRPM | DIASTOLIC BLOOD PRESSURE: 94 MMHG | WEIGHT: 176.37 LBS | TEMPERATURE: 97.5 F | HEIGHT: 71 IN

## 2025-05-17 PROBLEM — E87.6 HYPOKALEMIA: Status: RESOLVED | Noted: 2025-05-16 | Resolved: 2025-05-17

## 2025-05-17 PROCEDURE — 99239 HOSP IP/OBS DSCHRG MGMT >30: CPT | Performed by: HOSPITALIST

## 2025-05-17 RX ORDER — FOLIC ACID 1 MG/1
1 TABLET ORAL DAILY
Status: DISCONTINUED | OUTPATIENT
Start: 2025-05-17 | End: 2025-05-17 | Stop reason: HOSPADM

## 2025-05-17 RX ORDER — OXYCODONE HYDROCHLORIDE 10 MG/1
10 TABLET ORAL EVERY 4 HOURS PRN
Qty: 42 TABLET | Refills: 0 | Status: SHIPPED | OUTPATIENT
Start: 2025-05-17 | End: 2025-05-24

## 2025-05-17 RX ORDER — FOLIC ACID 1 MG/1
1 TABLET ORAL DAILY
Qty: 30 TABLET | Refills: 0 | Status: SHIPPED | OUTPATIENT
Start: 2025-05-18

## 2025-05-17 RX ORDER — LANOLIN ALCOHOL/MO/W.PET/CERES
100 CREAM (GRAM) TOPICAL DAILY
Status: DISCONTINUED | OUTPATIENT
Start: 2025-05-17 | End: 2025-05-17 | Stop reason: HOSPADM

## 2025-05-17 RX ORDER — PHENOBARBITAL SODIUM 130 MG/ML
130 INJECTION, SOLUTION INTRAMUSCULAR; INTRAVENOUS ONCE
Status: COMPLETED | OUTPATIENT
Start: 2025-05-17 | End: 2025-05-17

## 2025-05-17 RX ADMIN — THIAMINE HCL TAB 100 MG 100 MG: 100 TAB at 08:33

## 2025-05-17 RX ADMIN — LACTULOSE 30 G: 20 SOLUTION ORAL at 08:17

## 2025-05-17 RX ADMIN — OXYCODONE HYDROCHLORIDE 5 MG: 5 TABLET ORAL at 04:30

## 2025-05-17 RX ADMIN — FOLIC ACID 1 MG: 1 TABLET ORAL at 08:34

## 2025-05-17 RX ADMIN — PANTOPRAZOLE SODIUM 40 MG: 40 TABLET, DELAYED RELEASE ORAL at 05:11

## 2025-05-17 RX ADMIN — LIDOCAINE 5% 1 PATCH: 700 PATCH TOPICAL at 08:17

## 2025-05-17 RX ADMIN — PHENOBARBITAL SODIUM 130 MG: 130 INJECTION INTRAMUSCULAR; INTRAVENOUS at 00:14

## 2025-05-17 RX ADMIN — METHOCARBAMOL TABLETS 500 MG: 500 TABLET, COATED ORAL at 08:19

## 2025-05-17 RX ADMIN — CARVEDILOL 6.25 MG: 6.25 TABLET, FILM COATED ORAL at 08:16

## 2025-05-17 RX ADMIN — PANCRELIPASE 36000 UNITS: 120000; 24000; 76000 CAPSULE, DELAYED RELEASE PELLETS ORAL at 06:25

## 2025-05-17 RX ADMIN — PHENOBARBITAL SODIUM 130 MG: 130 INJECTION INTRAMUSCULAR; INTRAVENOUS at 01:13

## 2025-05-17 RX ADMIN — RIFAXIMIN 550 MG: 550 TABLET ORAL at 08:17

## 2025-05-17 RX ADMIN — OXYCODONE HYDROCHLORIDE 10 MG: 10 TABLET ORAL at 08:19

## 2025-05-17 RX ADMIN — NICOTINE 14 MG: 14 PATCH, EXTENDED RELEASE TRANSDERMAL at 08:17

## 2025-05-17 NOTE — ASSESSMENT & PLAN NOTE
Pt with a h/o of daily alcohol use   Drinks one 750 ml bottle of vodka daily  Without previous admission to the [facility] Medical Detox Unit   H/o withdrawal seizures  Interested in naltrexone  However, patient is currently receiving opioid medication for pain secondary to thoracic compression fracture   Withdrawal management as above  Continue thiamine, folic acid, and MVI  Consult case management/CRS for assistance with aftercare resources - pt interested in outpatient at this time
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
## 2025-05-17 NOTE — NURSING NOTE
Pt.'s did not have pain from 2239 to 0420. Pt.stated he has 9/10 pain in the mid back area at 0423.

## 2025-05-17 NOTE — ASSESSMENT & PLAN NOTE
"Lab Results   Component Value Date    HGBA1C 5.6 05/12/2025       No results for input(s): \"POCGLU\" in the last 72 hours.    Blood Sugar Average: Last 72 hrs:    Most recent hemoglobin A1c 5.6  Resume home Jardiance  "

## 2025-05-17 NOTE — NURSING NOTE
Pt. Signed an CAITLIN for spouse. Spouse called at 2200 and was concerned about pt's well-being. She was hoping he could go to inpt. Rehab for his alcoholism. I spoke to spouse and voiced that ultimately it was his decision to go to inpt. rehab.  I spoke to pt. about benefits of going to inpt.rehab and suggested he go due to Hx of how alcohol has effected him physically and mentally. Pt. stated he would think about it.
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
## 2025-05-17 NOTE — NURSING NOTE
Pt. Reported he almost fell while going to the bathroom. RN repeatedly told him to use the urinal or call bell if he needs to use the restroom. Bed alarm placed.

## 2025-05-17 NOTE — ASSESSMENT & PLAN NOTE
Last drink 4:30 AM 5/15  Serum alcohol 361 in the ED  Received 2 mg of lorazepam and 10 mg of Valium in the ED PTA  Without history of seizures  Toxicology consulted; appreciate recommendations   SEWS protocol for medical management of alcohol withdrawal  Total phenobarbital: 910 mg of phenobarbital

## 2025-05-17 NOTE — PLAN OF CARE
Problem: SUBSTANCE USE/ABUSE  Goal: By discharge, will develop insight into their chemical dependency and sustain motivation to continue in recovery  Description: INTERVENTIONS:  - Attends all daily group sessions and scheduled AA groups  - Actively practices coping skills through participation in the therapeutic community and adherence to program rules  - Reviews and completes assignments from individual treatment plan  - Assist patient development of understanding of their personal cycle of addiction and relapse triggers  5/17/2025 0517 by Sae Hicks RN  Outcome: Not Progressing  5/16/2025 1918 by Sae Hicks RN  Outcome: Progressing  Goal: By discharge, patient will have ongoing treatment plan addressing chemical dependency  Description: INTERVENTIONS:  - Assist patient with resources and/or appointments for ongoing recovery based living  5/17/2025 0517 by Sae Hicks RN  Outcome: Progressing  5/16/2025 1918 by Sae Hicks RN  Outcome: Progressing     Problem: INFECTION - ADULT  Goal: Absence or prevention of progression during hospitalization  Description: INTERVENTIONS:  - Assess and monitor for signs and symptoms of infection  - Monitor lab/diagnostic results  - Monitor all insertion sites, i.e. indwelling lines, tubes, and drains  - Monitor endotracheal if appropriate and nasal secretions for changes in amount and color  - [place] appropriate cooling/warming therapies per order  - Administer medications as ordered  - Instruct and encourage patient and family to use good hand hygiene technique  - Identify and instruct in appropriate isolation precautions for identified infection/condition  5/17/2025 0517 by Sae Hicks RN  Outcome: Progressing  5/16/2025 1918 by Sae Hicks RN  Outcome: Progressing  5/16/2025 1916 by Sae Hicks RN  Outcome: Progressing  Goal: Absence of fever/infection during neutropenic period  Description: INTERVENTIONS:  - Monitor WBC  -  Perform strict hand hygiene  - Limit to healthy visitors only  - No plants, dried, fresh or silk flowers with lake in patient room  5/17/2025 0517 by Sae Hicks RN  Outcome: Progressing  5/16/2025 1918 by Sae Hicks RN  Outcome: Progressing  5/16/2025 1916 by Sae Hicks RN  Outcome: Progressing     Problem: PAIN - ADULT  Goal: Verbalizes/displays adequate comfort level or baseline comfort level  Description: Interventions:  - Encourage patient to monitor pain and request assistance  - Assess pain using appropriate pain scale  - Administer analgesics as ordered based on type and severity of pain and evaluate response  - Implement non-pharmacological measures as appropriate and evaluate response  - Consider cultural and social influences on pain and pain management  - Notify physician/advanced practitioner if interventions unsuccessful or patient reports new pain  - Educate patient/family on pain management process including their role and importance of  reporting pain   - Provide non-pharmacologic/complimentary pain relief interventions  5/17/2025 0517 by Sae Hicks RN  Outcome: Not Progressing  5/16/2025 1918 by Sae Hicks RN  Outcome: Progressing  5/16/2025 1916 by Sae Hicks RN  Outcome: Progressing     Problem: Knowledge Deficit  Goal: Patient/family/caregiver demonstrates understanding of disease process, treatment plan, medications, and discharge instructions  Description: Complete learning assessment and assess knowledge base.  Interventions:  - Provide teaching at level of understanding  - Provide teaching via preferred learning methods  5/17/2025 0517 by Sae Hicks RN  Outcome: Not Progressing  5/16/2025 1918 by Sae Hicks RN  Outcome: Progressing  5/16/2025 1916 by Sae Hicks RN  Outcome: Progressing

## 2025-05-17 NOTE — PLAN OF CARE
Problem: PAIN - ADULT  Goal: Verbalizes/displays adequate comfort level or baseline comfort level  Description: Interventions:  - Encourage patient to monitor pain and request assistance  - Assess pain using appropriate pain scale  - Administer analgesics as ordered based on type and severity of pain and evaluate response  - Implement non-pharmacological measures as appropriate and evaluate response  - Consider cultural and social influences on pain and pain management  - Notify physician/advanced practitioner if interventions unsuccessful or patient reports new pain  - Educate patient/family on pain management process including their role and importance of  reporting pain   - Provide non-pharmacologic/complimentary pain relief interventions  Outcome: Adequate for Discharge

## 2025-05-17 NOTE — SOCIAL WORK
Pt to discharge today. Pt denies any withdrawal symptoms at this time. Pt to discharge to home and a [employer]  will  upon discharge. Pt to follow up with referral to [facility]. [facility]  [address]  [address]  [place] [address]  Phone: [phone]  Fax: [phone]     Discharge Address:   [address] [place] [address] [address] JANENE [place]ON [address] [phone] (M)  [phone] (H)     Phone Number:

## 2025-05-17 NOTE — NURSING NOTE
Pt's spouse stated case management can call her in relation to pt. going to rehab. Spouse Geraldine, [phone]

## 2025-05-18 DIAGNOSIS — G47.00 INSOMNIA, UNSPECIFIED TYPE: ICD-10-CM

## 2025-05-18 RX ORDER — ONDANSETRON 4 MG/1
TABLET, ORALLY DISINTEGRATING ORAL
Qty: 30 TABLET | Refills: 0 | OUTPATIENT
Start: 2025-05-18

## 2025-05-19 ENCOUNTER — TRANSITIONAL CARE MANAGEMENT (OUTPATIENT)
Dept: FAMILY MEDICINE CLINIC | Facility: CLINIC | Age: 50
End: 2025-05-19

## 2025-05-19 ENCOUNTER — TELEPHONE (OUTPATIENT)
Dept: FAMILY MEDICINE CLINIC | Facility: CLINIC | Age: 50
End: 2025-05-19

## 2025-05-19 NOTE — TELEPHONE ENCOUNTER
Patient called back in regards to scheduling a TCM appt. He said he is going to wait until he sees ortho tomorrow and see what they say before he schedules with us. He does feel at this time he does not need an appointment with Bill.

## 2025-05-19 NOTE — TELEPHONE ENCOUNTER
05/21/2025- PT WAS DISCHARGED HOME. CALLED AND SPOKE WITH PT CONFIRMING 06/04/2025 APT W/ X-RAY NEEDED PRIOR. PT WAS INFORMED THAT THE X-RAY WAS A WALK IN SERVICE AND SHOULD BE COMPLETED ABOUT 2-3 DAYS PRIOR TO THE APT.    5/19/25 - PT DISCHARGED TO HOME? CALLED PT AND LEFT MESSAGE ON MACHINE TO CONFIRM [facility] FOLLOW [facility]. OFFICE PHONE NUMBER WAS PROVIDED FOR A CALL BACK.

## 2025-05-19 NOTE — UTILIZATION REVIEW
NOTIFICATION OF ADMISSION DISCHARGE   This is a Notification of Discharge from [facility]. Please be advised that this patient has been discharge from our facility. Below you will find the admission and discharge date and time including the patient’s disposition.   UTILIZATION REVIEW CONTACT:  Utilization Review Assistants  Network Utilization Review Department  Phone: [phone] x carefully listen to the prompts. All voicemails are confidential.  Email: [email or web address]     ADMISSION INFORMATION  PRESENTATION DATE: 5/15/2025 10:45 AM  OBERVATION ADMISSION DATE: N/A  INPATIENT ADMISSION DATE: 5/15/25 10:45 AM   DISCHARGE DATE: 5/17/2025  9:53 AM   DISPOSITION:Home/Self Care    Network Utilization Review Department  ATTENTION: Please call with any questions or concerns to [phone] and carefully listen to the prompts so that you are directed to the right person. All voicemails are confidential.   For Discharge needs, contact Care Management DC Support Team at [phone] opt. 2  Send all requests for admission clinical reviews, approved or denied determinations and any other requests to dedicated fax number below belonging to the campus where the patient is receiving treatment. List of dedicated fax numbers for the Facilities:  FACILITY NAME UR FAX NUMBER   ADMISSION DENIALS (Administrative/Medical Necessity) [phone]   DISCHARGE SUPPORT TEAM ([facility]) [phone]   PARENT CHILD HEALTH (Maternity/NICU/Pediatrics) [phone]   General acute hospital [phone]   [facility] [phone]   [facility] [phone]   [facility] [phone]   [facility] [phone]   [facility] [phone]   [facility] [phone]   [facility] [phone]   [facility]  [facility] [phone]   [facility] [phone]   [facility] [phone]   [facility] [phone]

## 2025-05-20 ENCOUNTER — OFFICE VISIT (OUTPATIENT)
Age: 50
End: 2025-05-20
Payer: COMMERCIAL

## 2025-05-20 VITALS — WEIGHT: 176 LBS | HEIGHT: 71 IN | BODY MASS INDEX: 24.64 KG/M2

## 2025-05-20 DIAGNOSIS — S22.040A COMPRESSION FRACTURE OF T4 VERTEBRA, INITIAL ENCOUNTER (HCC): Primary | ICD-10-CM

## 2025-05-20 DIAGNOSIS — S22.050A COMPRESSION FRACTURE OF T5 VERTEBRA, INITIAL ENCOUNTER (HCC): ICD-10-CM

## 2025-05-20 PROCEDURE — 99214 OFFICE O/P EST MOD 30 MIN: CPT | Performed by: ORTHOPAEDIC SURGERY

## 2025-05-20 NOTE — LETTER
May 20, 2025     Patient: Ashwin Wright  YOB: 1975  Date of Visit: 5/20/2025      To Whom it May Concern:    Ashwin Wright is under my professional care. Ashwin was seen in my office on 5/20/2025. Ashwin is to remain out of work until re-evaluation by myself in 6 weeks.    If you have any questions or concerns, please don't hesitate to call.         Sincerely,          Linda Hodges MD        CC: No Recipients
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
## 2025-05-22 NOTE — TELEPHONE ENCOUNTER
Celestina from [facility] called to verify if patient had a hospital follow up appointment with PCP.  Celestina aware patient did not wish to schedule at this time and that he was following with Orthopedics

## 2025-05-27 NOTE — PROGRESS NOTES
April Michaels I don't think he is going to come in, he let me know for him and his wife they are both doing rehab and they discussed with Bill and informed Bill at his last visit that he wasn't going to come in for a hospital visit which is documented in wife chart but he said for both of them. Should we try to call him one more time event though he said he would call back

## 2025-06-02 ENCOUNTER — HOSPITAL ENCOUNTER (OUTPATIENT)
Dept: RADIOLOGY | Facility: HOSPITAL | Age: 50
Discharge: HOME | End: 2025-06-02
Payer: COMMERCIAL

## 2025-06-02 DIAGNOSIS — S22.000D COMPRESSION FRACTURE OF THORACIC VERTEBRA WITH ROUTINE HEALING, UNSPECIFIED THORACIC VERTEBRAL LEVEL, SUBSEQUENT ENCOUNTER: ICD-10-CM

## 2025-06-02 PROCEDURE — 72072 X-RAY EXAM THORAC SPINE 3VWS: CPT

## 2025-06-03 NOTE — PROGRESS NOTES
Can we close this please he has an apt schedule with Bill for the 12th but I think it is out the time frame and he can't come in any sooner

## 2025-06-04 ENCOUNTER — OFFICE VISIT (OUTPATIENT)
Dept: NEUROSURGERY | Facility: CLINIC | Age: 50
End: 2025-06-04
Payer: COMMERCIAL

## 2025-06-04 VITALS
BODY MASS INDEX: 25.48 KG/M2 | TEMPERATURE: 98.7 F | HEIGHT: 71 IN | DIASTOLIC BLOOD PRESSURE: 82 MMHG | SYSTOLIC BLOOD PRESSURE: 144 MMHG | WEIGHT: 182 LBS | HEART RATE: 79 BPM | OXYGEN SATURATION: 98 %

## 2025-06-04 DIAGNOSIS — S22.029A T2 VERTEBRAL FRACTURE (HCC): Primary | ICD-10-CM

## 2025-06-04 PROCEDURE — 99202 OFFICE O/P NEW SF 15 MIN: CPT | Performed by: PHYSICIAN ASSISTANT
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
## 2025-06-05 ENCOUNTER — RA CDI HCC (OUTPATIENT)
Dept: OTHER | Facility: HOSPITAL | Age: 50
End: 2025-06-05

## 2025-06-05 NOTE — PROGRESS NOTES
HCC coding opportunities       Chart reviewed, no opportunity found: CHART REVIEWED, NO OPPORTUNITY FOUND        Patients Insurance        Commercial Insurance: [employer]

## 2025-06-08 ENCOUNTER — APPOINTMENT (EMERGENCY)
Dept: CT IMAGING | Facility: HOSPITAL | Age: 50
End: 2025-06-08
Payer: COMMERCIAL

## 2025-06-08 ENCOUNTER — HOSPITAL ENCOUNTER (OUTPATIENT)
Facility: HOSPITAL | Age: 50
Setting detail: OBSERVATION
Discharge: HOME | End: 2025-06-10
Attending: EMERGENCY MEDICINE | Admitting: INTERNAL MEDICINE
Payer: COMMERCIAL

## 2025-06-08 DIAGNOSIS — S22.040A COMPRESSION FRACTURE OF T4 VERTEBRA, INITIAL ENCOUNTER (HCC): ICD-10-CM

## 2025-06-08 DIAGNOSIS — R26.2 AMBULATORY DYSFUNCTION: ICD-10-CM

## 2025-06-08 DIAGNOSIS — R29.898 LEG WEAKNESS, BILATERAL: ICD-10-CM

## 2025-06-08 DIAGNOSIS — M54.9 BACK PAIN: Primary | ICD-10-CM

## 2025-06-08 DIAGNOSIS — K59.00 CONSTIPATION: ICD-10-CM

## 2025-06-08 LAB
GLUCOSE SERPL-MCNC: 200 MG/DL (ref 65–140)
GLUCOSE SERPL-MCNC: 240 MG/DL (ref 65–140)

## 2025-06-08 PROCEDURE — 72128 CT CHEST SPINE W/O DYE: CPT

## 2025-06-08 PROCEDURE — 94762 N-INVAS EAR/PLS OXIMTRY CONT: CPT

## 2025-06-08 PROCEDURE — 99223 1ST HOSP IP/OBS HIGH 75: CPT | Performed by: INTERNAL MEDICINE

## 2025-06-08 PROCEDURE — 96372 THER/PROPH/DIAG INJ SC/IM: CPT

## 2025-06-08 PROCEDURE — 72131 CT LUMBAR SPINE W/O DYE: CPT

## 2025-06-08 PROCEDURE — 82948 REAGENT STRIP/BLOOD GLUCOSE: CPT

## 2025-06-08 PROCEDURE — 99285 EMERGENCY DEPT VISIT HI MDM: CPT

## 2025-06-08 RX ORDER — ALBUTEROL SULFATE 90 UG/1
2 INHALANT RESPIRATORY (INHALATION) EVERY 6 HOURS PRN
Status: DISCONTINUED | OUTPATIENT
Start: 2025-06-08 | End: 2025-06-10 | Stop reason: HOSPADM

## 2025-06-08 RX ORDER — DEXAMETHASONE SODIUM PHOSPHATE 10 MG/ML
10 INJECTION, SOLUTION INTRAMUSCULAR; INTRAVENOUS ONCE
Status: COMPLETED | OUTPATIENT
Start: 2025-06-08 | End: 2025-06-08

## 2025-06-08 RX ORDER — HYDROMORPHONE HCL/PF 1 MG/ML
0.5 SYRINGE (ML) INJECTION
Status: DISCONTINUED | OUTPATIENT
Start: 2025-06-08 | End: 2025-06-10 | Stop reason: HOSPADM

## 2025-06-08 RX ORDER — MAGNESIUM HYDROXIDE/ALUMINUM HYDROXICE/SIMETHICONE 120; 1200; 1200 MG/30ML; MG/30ML; MG/30ML
30 SUSPENSION ORAL EVERY 6 HOURS PRN
Status: DISCONTINUED | OUTPATIENT
Start: 2025-06-08 | End: 2025-06-10 | Stop reason: HOSPADM

## 2025-06-08 RX ORDER — OXYCODONE HYDROCHLORIDE 5 MG/1
5 TABLET ORAL EVERY 6 HOURS PRN
Refills: 0 | Status: DISCONTINUED | OUTPATIENT
Start: 2025-06-08 | End: 2025-06-10 | Stop reason: HOSPADM

## 2025-06-08 RX ORDER — FAMOTIDINE 20 MG/1
40 TABLET, FILM COATED ORAL
Status: DISCONTINUED | OUTPATIENT
Start: 2025-06-08 | End: 2025-06-10 | Stop reason: HOSPADM

## 2025-06-08 RX ORDER — DIAZEPAM 10 MG/2ML
2.5 INJECTION, SOLUTION INTRAMUSCULAR; INTRAVENOUS ONCE
Status: COMPLETED | OUTPATIENT
Start: 2025-06-08 | End: 2025-06-08

## 2025-06-08 RX ORDER — ENOXAPARIN SODIUM 100 MG/ML
40 INJECTION SUBCUTANEOUS
Status: DISCONTINUED | OUTPATIENT
Start: 2025-06-08 | End: 2025-06-10 | Stop reason: HOSPADM

## 2025-06-08 RX ORDER — ACETAMINOPHEN 325 MG/1
325 TABLET ORAL EVERY 4 HOURS PRN
Status: DISCONTINUED | OUTPATIENT
Start: 2025-06-08 | End: 2025-06-10 | Stop reason: HOSPADM

## 2025-06-08 RX ORDER — FENOFIBRATE 48 MG/1
48 TABLET, FILM COATED ORAL DAILY
Status: DISCONTINUED | OUTPATIENT
Start: 2025-06-08 | End: 2025-06-08

## 2025-06-08 RX ORDER — LACTULOSE 10 G/15ML
30 SOLUTION ORAL 2 TIMES DAILY
Status: DISCONTINUED | OUTPATIENT
Start: 2025-06-08 | End: 2025-06-10 | Stop reason: HOSPADM

## 2025-06-08 RX ORDER — ATORVASTATIN CALCIUM 40 MG/1
80 TABLET, FILM COATED ORAL
Status: DISCONTINUED | OUTPATIENT
Start: 2025-06-08 | End: 2025-06-10 | Stop reason: HOSPADM

## 2025-06-08 RX ORDER — INSULIN LISPRO 100 [IU]/ML
1-5 INJECTION, SOLUTION INTRAVENOUS; SUBCUTANEOUS
Status: DISCONTINUED | OUTPATIENT
Start: 2025-06-08 | End: 2025-06-10 | Stop reason: HOSPADM

## 2025-06-08 RX ORDER — CARVEDILOL 6.25 MG/1
6.25 TABLET ORAL 2 TIMES DAILY WITH MEALS
Status: DISCONTINUED | OUTPATIENT
Start: 2025-06-08 | End: 2025-06-10 | Stop reason: HOSPADM

## 2025-06-08 RX ORDER — HYDROMORPHONE HCL/PF 1 MG/ML
1 SYRINGE (ML) INJECTION ONCE
Status: COMPLETED | OUTPATIENT
Start: 2025-06-08 | End: 2025-06-08

## 2025-06-08 RX ORDER — DIAZEPAM 10 MG/2ML
2.5 INJECTION, SOLUTION INTRAMUSCULAR; INTRAVENOUS ONCE
Status: DISCONTINUED | OUTPATIENT
Start: 2025-06-08 | End: 2025-06-08

## 2025-06-08 RX ORDER — LANOLIN ALCOHOL/MO/W.PET/CERES
250 CREAM (GRAM) TOPICAL DAILY
Status: DISCONTINUED | OUTPATIENT
Start: 2025-06-08 | End: 2025-06-10 | Stop reason: HOSPADM

## 2025-06-08 RX ORDER — ONDANSETRON 2 MG/ML
4 INJECTION INTRAMUSCULAR; INTRAVENOUS EVERY 6 HOURS PRN
Status: DISCONTINUED | OUTPATIENT
Start: 2025-06-08 | End: 2025-06-10 | Stop reason: HOSPADM

## 2025-06-08 RX ORDER — HYDROMORPHONE HCL/PF 1 MG/ML
1 SYRINGE (ML) INJECTION ONCE
Status: DISCONTINUED | OUTPATIENT
Start: 2025-06-08 | End: 2025-06-08

## 2025-06-08 RX ORDER — HYDROMORPHONE HCL/PF 1 MG/ML
0.5 SYRINGE (ML) INJECTION
Status: DISCONTINUED | OUTPATIENT
Start: 2025-06-08 | End: 2025-06-08

## 2025-06-08 RX ORDER — FOLIC ACID 1 MG/1
1 TABLET ORAL DAILY
Status: DISCONTINUED | OUTPATIENT
Start: 2025-06-08 | End: 2025-06-10 | Stop reason: HOSPADM

## 2025-06-08 RX ORDER — INSULIN GLARGINE 100 [IU]/ML
15 INJECTION, SOLUTION SUBCUTANEOUS
Status: DISCONTINUED | OUTPATIENT
Start: 2025-06-09 | End: 2025-06-10 | Stop reason: HOSPADM

## 2025-06-08 RX ORDER — PANTOPRAZOLE SODIUM 40 MG/1
40 TABLET, DELAYED RELEASE ORAL
Status: DISCONTINUED | OUTPATIENT
Start: 2025-06-09 | End: 2025-06-10 | Stop reason: HOSPADM

## 2025-06-08 RX ADMIN — INSULIN LISPRO 2 UNITS: 100 INJECTION, SOLUTION INTRAVENOUS; SUBCUTANEOUS at 18:02

## 2025-06-08 RX ADMIN — DEXAMETHASONE SODIUM PHOSPHATE 10 MG: 10 INJECTION, SOLUTION INTRAMUSCULAR; INTRAVENOUS at 10:41

## 2025-06-08 RX ADMIN — HYDROMORPHONE HYDROCHLORIDE 1 MG: 1 INJECTION, SOLUTION INTRAMUSCULAR; INTRAVENOUS; SUBCUTANEOUS at 14:10

## 2025-06-08 RX ADMIN — PANCRELIPASE 36000 UNITS: 120000; 24000; 76000 CAPSULE, DELAYED RELEASE PELLETS ORAL at 18:02

## 2025-06-08 RX ADMIN — DIAZEPAM 2.5 MG: 5 INJECTION, SOLUTION INTRAMUSCULAR; INTRAVENOUS at 10:41

## 2025-06-08 RX ADMIN — THIAMINE HCL TAB 100 MG 250 MG: 100 TAB at 18:02

## 2025-06-08 RX ADMIN — OXYCODONE HYDROCHLORIDE 5 MG: 5 TABLET ORAL at 18:01

## 2025-06-08 RX ADMIN — Medication 1 TABLET: at 18:02

## 2025-06-08 RX ADMIN — FAMOTIDINE 40 MG: 20 TABLET, FILM COATED ORAL at 21:30

## 2025-06-08 RX ADMIN — FOLIC ACID 1 MG: 1 TABLET ORAL at 18:02

## 2025-06-08 RX ADMIN — CARVEDILOL 6.25 MG: 6.25 TABLET, FILM COATED ORAL at 18:02

## 2025-06-08 RX ADMIN — LACTULOSE 30 G: 10 SOLUTION ORAL at 18:02

## 2025-06-08 RX ADMIN — ENOXAPARIN SODIUM 40 MG: 40 INJECTION SUBCUTANEOUS at 18:02

## 2025-06-08 RX ADMIN — INSULIN LISPRO 1 UNITS: 100 INJECTION, SOLUTION INTRAVENOUS; SUBCUTANEOUS at 21:30

## 2025-06-08 RX ADMIN — HYDROMORPHONE HYDROCHLORIDE 0.5 MG: 1 INJECTION, SOLUTION INTRAMUSCULAR; INTRAVENOUS; SUBCUTANEOUS at 21:29

## 2025-06-08 RX ADMIN — ATORVASTATIN CALCIUM 80 MG: 40 TABLET, FILM COATED ORAL at 18:02

## 2025-06-08 NOTE — ASSESSMENT & PLAN NOTE
Malnutrition Findings:                        High-protein diet advised         BMI Findings:           Body mass index is 26.14 kg/m².

## 2025-06-08 NOTE — H&P
"H&P - Hospitalist   Name: Ashwin Wright 50 y.o. male I MRN: [id number]  Unit/Bed#: ED 22 I Date of Admission: 6/8/2025   Date of Service: 6/8/2025 I Hospital Day: 0     Assessment & Plan  Thoracic compression fracture (HCC)  Patient has severe back pain 10/10 in intensity on admission.  T1-T4 compression fractures presenting with worsening back pain radiating down bilateral legs. Very low concern for cauda equina - post void residual was 10mL, he had pin prick sensation. Bilateral LE strength is 3/5 and he is having pain with ambulating. ER discussed with neuro surg on-call in [place] who does not think his new symptoms are not related to his compression fractures.  CT Subacute fractures along the superior endplates of T2-T5 with minimal interval loss of vertebral body height since 5/15/2025 (however less than 25% height loss overall). No retropulsion into the spinal canal or involvement of the posterior elements.  No new fractures. No traumatic subluxation.  Patient with pain and exam unchanged despite multimodal pain regimen.  Therefore patient was hospitalized.  Will get orthopedics consult and also get physical and Occupational Therapy.  Back brace.  Will defer need for further imaging and possible MRI to orthopedics  Chronic pancreatitis (HCC)  Continue Creon 36,000 units 3 times daily with meals.  No evidence of acute pancreatitis clinically  GERD (gastroesophageal reflux disease)  Patient takes PPI in the morning, Pepcid in the evening.  Continue home regimen  Severe protein-calorie malnutrition (HCC)  Malnutrition Findings:                        High-protein diet advised         BMI Findings:           Body mass index is 26.14 kg/m².     Controlled type 2 diabetes mellitus with hyperglycemia, with long-term current use of insulin (HCC)  Lab Results   Component Value Date    HGBA1C 5.6 05/12/2025       No results for input(s): \"POCGLU\" in the last 72 hours.    Blood Sugar Average: Last 72 hrs:    Hold " Elvin.  Lantus 15 units subcu daily in the morning.  Patient does not take any mealtime insulins.  Will do blood sugar monitoring and sliding scale insulin coverage  Cirrhosis of liver without ascites, unspecified hepatic cirrhosis type (HCC)  Patient to continue lactulose as he has high risk of hepatic encephalopathy  Alcohol use disorder, severe, dependence (HCC)  Continue thiamine, folic acid and multivitamin  On CIWA protocol      VTE Pharmacologic Prophylaxis: VTE Score: 2 Lovenox  Code Status: Level 1 - Full Code   Discussion with family: Spoke to patient in detail.     Anticipated Length of Stay: Patient will be admitted on an observation basis with an anticipated length of stay of less than 2 midnights secondary to at least 1 midnight due to severe back pain.    History of Present Illness   Chief Complaint: Severe back pain    Ashwin Wright is a 50 y.o. male with a PMH of history of alcohol use, chronic pancreatitis and history of hepatic encephalopathy who presents with severe worsening back pain.  Patient was recently in [place] for detox.  On imaging he has T1-T4 compression fractures which is causing his worsening back pain radiating down bilateral legs.  Pain is 10/10 in intensity at its worst and better with pain medications.     CT scan subacute fractures along the superior endplates of T2-T5 with minimal interval loss of vertebral body height since 5/15/2025 (however less than 25% height loss overall). No retropulsion into the spinal canal or involvement of the posterior elements.    Patient does not have any severe chest pain, palpitations or diaphoresis.  No severe abdominal pain or no confusion    Review of Systems   Constitutional:  Positive for fatigue. Negative for activity change, appetite change and fever.   HENT:  Negative for congestion, dental problem, drooling and sore throat.    Eyes:  Negative for pain and discharge.   Respiratory:  Negative for apnea and choking.     Cardiovascular:  Negative for chest pain, palpitations and leg swelling.   Gastrointestinal:  Negative for abdominal distention and abdominal pain.   Endocrine: Negative for cold intolerance, polydipsia and polyphagia.   Genitourinary:  Negative for dysuria and hematuria.   Musculoskeletal:  Positive for back pain and gait problem.   Skin:  Negative for color change and pallor.   Neurological:  Negative for seizures.   Hematological:  Negative for adenopathy. Does not bruise/bleed easily.   Psychiatric/Behavioral:  Negative for agitation, behavioral problems and confusion.        Historical Information   Past Medical History[1]  Past Surgical History[2]  Social History[3]  E-Cigarette/Vaping    E-Cigarette Use Current Some Day User      E-Cigarette/Vaping Substances    Nicotine Yes     THC No     CBD No     Flavoring No     Other No     Unknown No      Family history non-contributory  Social History:  Marital Status: /Civil Union   Patient Pre-hospital Living Situation: Home  Patient Pre-hospital Level of Mobility: walks usually is able to walk  Patient Pre-hospital Diet Restrictions: Heart healthy diet    Meds/Allergies   I have reviewed home medications with patient personally.  Prior to Admission medications    Medication Sig Start Date End Date Taking? Authorizing Provider   albuterol (PROVENTIL HFA,VENTOLIN HFA) 90 mcg/act inhaler Inhale 2 puffs every 6 (six) hours as needed for wheezing 4/21/25   LUANA Mejía   carvedilol (COREG) 3.125 mg tablet Take 2 tablets (6.25 mg total) by mouth 2 (two) times a day with meals 2/10/25 8/9/25  Rajiv Burgos III, MD   Choline Fenofibrate (Fenofibric Acid) 135 MG CPDR TAKE ONE CAPSULE BY MOUTH DAILY 3/27/25   LUANA Mejía   Empagliflozin 10 MG TABS Take 10 mg by mouth every morning    Historical Provider, MD   esomeprazole (NexIUM) 40 MG capsule Take 1 capsule (40 mg total) by mouth if needed (heartburn) 2/10/25   Rajiv Burgos III, MD    famotidine (PEPCID) 40 MG tablet Take 40 mg by mouth daily at bedtime as needed    Historical Provider, MD   folic acid (FOLVITE) 1 mg tablet Take 1 tablet (1 mg total) by mouth daily 5/18/25   Eli Cortez PA-C   Insulin Pen Needle (Pen Needles) 32G X 5 MM MISC  4/29/21   Historical Provider, MD   lactulose (CHRONULAC) 10 g/15 mL solution Take 45 mL (30 g total) by mouth 2 (two) times a day Titrate dose to have 2-3 bowel movements daily. Take an additional dose if you are not having 2-3 bowel movements daily. 2/13/25   Dinora Mensah PA-C   Multiple Vitamins-Minerals (MULTIVITAMIN ADULTS PO) Take by mouth daily after breakfast    Historical Provider, MD   Pancrelipase, Lip-Prot-Amyl, (Creon) 12229-132472 units CPEP TAKE 1 CAPSULE (36,000 UNITS TOTAL) BY MOUTH 3 (THREE) TIMES A DAY BEFORE MEALS 9/10/24   Dinora Mensah PA-C   rifaximin (XIFAXAN) 550 mg tablet Take 1 tablet (550 mg total) by mouth every 12 (twelve) hours 3/11/25 6/9/25  Dinora Mensah PA-C   rosuvastatin (CRESTOR) 40 MG tablet TAKE ONE TABLET BY MOUTH EVERY DAY 7/5/24   LUANA Mejía   Thiamine HCl (vitamin B-1) 250 MG tablet Take 250 mg by mouth daily 1/22/25 2/21/25  Historical Provider, MD     Allergies   Allergen Reactions    Bee Venom Swelling    Percocet [Oxycodone-Acetaminophen] GI Bleeding, Vomiting and Abdominal Pain       Objective :  Temp:  [98.6 °F (37 °C)] 98.6 °F (37 °C)  HR:  [77-96] 83  BP: (145-189)/() 145/75  Resp:  [16-20] 16  SpO2:  [95 %-98 %] 98 %  O2 Device: None (Room air)    Physical Exam     General exam- looks weak    HEENT - atraumatic and normocephalic  Neck- supple  Skin - no fresh rash  Extremities no fresh focal deformities  Cardiovascular- S1-S2 heard  Respiratory- bilateral air entry present, no crackles or rhonchi  Skin - no fresh rash  Abdomen - normal bowel sounds present, no rebound tenderness  CNS- No fresh focal deficits  Psych- no acute psychosis  Does not have any asterixis.  Gait could not be  tested because of the fact that the patient is quite weak to walk    Lines/Drains:            Lab Results: I have reviewed the following results:      Results from last 7 days   Lab Units 06/06/25  2310   SODIUM mmol/L 143   POTASSIUM mmol/L 3.1*   CHLORIDE mmol/L 107   CO2 mmol/L 27   BUN mg/dL 5*   CREATININE mg/dL 0.48*   ANION GAP  9   CALCIUM mg/dL 8*   ALBUMIN g/dL 3.6   TOTAL BILIRUBIN mg/dL 0.8   ALK PHOS U/L 160*   ALT U/L 21   AST U/L 66*   GLUCOSE RANDOM mg/dL 99             Lab Results   Component Value Date    HGBA1C 5.6 05/12/2025    HGBA1C 5.1 01/17/2025    HGBA1C  01/17/2025     Unable to determine A1c due to interfering substance, reflexed to alternate methodology.           Imaging Results Review: No pertinent imaging studies reviewed.  Other Study Results Review: No additional pertinent studies reviewed.    Administrative Statements   I have spent a total time of 75 minutes in caring for this patient on the day of the visit/encounter including Diagnostic results, Prognosis, Risks and benefits of tx options, Instructions for management, Patient and family education, Counseling / Coordination of care, Documenting in the medical record, Reviewing/placing orders in the medical record (including tests, medications, and/or procedures), Obtaining or reviewing history  , and Communicating with other healthcare professionals .    ** Please Note: This note has been constructed using a voice recognition system. **         [1]   Past Medical History:  Diagnosis Date    Alcoholism (HCC)     Arthritis     Asthma     Chronic pain disorder     Chronic pancreatitis (HCC)     Cirrhosis (HCC)     early cirrhosis    GERD (gastroesophageal reflux disease)     History of COVID-19 01/2022    mild s/s    Hyperlipidemia     Hypertension     Liver abscess     Liver disease     Meniscus tear     left knee work injury last assessed 08/24/2016    Pancreatitis     Pneumonia     Rotator cuff tear     Stomach disorder      Chronic gastritus   [2]   Past Surgical History:  Procedure Laterality Date    CELIAC PLEXUS BLOCK Bilateral 10/29/2018    Procedure: SPLANCHNIC NERVE BLOCK;  Surgeon: Ramiro Chinchilla MD;  [facility];  Service: Pain Management     CELIAC PLEXUS BLOCK Bilateral 01/10/2019    Procedure: SPLANCHNIC NERVE BLOCK;  Surgeon: Ramiro Chinchilla MD;  [facility];  Service: Pain Management     CHOLECYSTECTOMY      COLONOSCOPY      ESOPHAGOGASTRODUODENOSCOPY N/A 11/16/2017    Procedure: ESOPHAGOGASTRODUODENOSCOPY (EGD);  Surgeon: Ever Bonner MD;  [facility];  Service: Gastroenterology    ESOPHAGOGASTRODUODENOSCOPY N/A 04/19/2018    Procedure: ESOPHAGOGASTRODUODENOSCOPY (EGD);  Surgeon: Orestes Forrest MD;  [facility];  Service: Gastroenterology    ESOPHAGOGASTRODUODENOSCOPY N/A 05/10/2018    Procedure: ESOPHAGOGASTRODUODENOSCOPY (EGD);  Surgeon: Vaibhav Matthew MD;  [facility];  Service: Gastroenterology    ESOPHAGUS SURGERY  01/17/2025    nubia galindo tear repain    HERNIA REPAIR Bilateral 02/03/2023    Procedure: REPAIR HERNIA INGUINAL, LAPAROSCOPIC,;  Surgeon: Juanjo Travis DO;  [facility];  Service: General    IR TEMPORARY DIALYSIS CATHETER PLACEMENT  02/28/2019    KNEE ARTHROSCOPY Bilateral     KNEE ARTHROSCOPY Right 2009    cibishino last assessed 08/24/2016    KNEE ARTHROSCOPY Right 07/01/2019    LIVER SURGERY      NERVE BLOCK Bilateral 05/29/2018    Procedure: SPLANCHNIC NERVE BLOCK;  Surgeon: Ramiro Chinchilla MD;  [facility];  Service: Pain Management     PANCREAS SURGERY      stents    PANCREATIC CYST EXCISION      MA INJECTION ANES LMBR/THRC PARAVERTBRL SYMPATHETIC Bilateral 12/28/2017    Procedure: SPLANCHNIC NERVE BLOCK;  Surgeon: Ramiro Chinchilla MD;  [facility];  Service: Pain Management     MA INJX ANES CELIAC PLEXUS W/WO RADIOLOGIC MONITRNG Bilateral 05/09/2017    Procedure: CELIAC PLEXUS BLOCK ;  Surgeon: Ramiro Chinchilla MD;   [facility];  Service: Pain Management     CO INJX ANES CELIAC PLEXUS W/WO RADIOLOGIC MONITRNG Bilateral 2017    Procedure: SPLANCHNIC NERVE BLOCK at T12;  Surgeon: Ramiro Chinchilla MD;  [facility];  Service: Pain Management     CO INJX ANES CELIAC PLEXUS W/WO RADIOLOGIC MONITRNG Bilateral 2017    Procedure: BILATERAL SPLANCHNIC NERVE BLOCK T12;  Surgeon: Ramiro Chinchilla MD;  [facility];  Service: Pain Management     CO INJX ANES CELIAC PLEXUS W/WO RADIOLOGIC MONITRNG Bilateral 2019    Procedure: BLOCK / INJECTION CELIAC PLEXUS;  Surgeon: Ramiro Chinchilla MD;  [facility];  Service: Pain Management     CO INJX ANES CELIAC PLEXUS W/WO RADIOLOGIC MONITRNG Bilateral 2019    Procedure: SPLANCHNIC NERVE BLOCK;  Surgeon: Ramiro Chinchilla MD;  [facility];  Service: Pain Management     CO INJX ANES CELIAC PLEXUS W/WO RADIOLOGIC MONITRNG Bilateral 10/17/2019    Procedure: SPLANCHNIC NERVE BLOCK;  Surgeon: Ramiro Chinchilla MD;  [facility];  Service: Pain Management     CO LAPAROSCOPY SURG CHOLECYSTECTOMY N/A 2017    Procedure: LAPAROSCOPIC CHOLECYSTECTOMY, IOC, POSSIBLE OPEN.;  Surgeon: Suresh Lang MD;  [facility];  Service: General    CO SURGICAL ARTHROSCOPY SHOULDER W/ROTATOR CUFF RPR Left 2023    Procedure: Left Shoulder Arthroscopic Rotator Cuff Repair, Open Subpectoral Biceps Tenodesis, Acromioplasty, Extensive Debridement;  Surgeon: Toro Healy DO;  [facility];  Service: Orthopedics    ROTATOR CUFF REPAIR Right     SHOULDER ARTHROSCOPY      SHOULDER ARTHROSCOPY Right     SHOULDER SURGERY      TENDON REPAIR      Right shoulder   [3]   Social History  Tobacco Use    Smoking status: Former     Current packs/day: 0.00     Average packs/day: 1 pack/day for 20.0 years (20.0 ttl pk-yrs)     Types: Cigarettes     Start date: 3/15/2001     Quit date: 3/15/2021     Years since quittin.2    Smokeless tobacco:  Never   Vaping Use    Vaping status: Some Days    Substances: Nicotine   Substance and Sexual Activity    Alcohol use: Yes     Alcohol/week: 1.0 standard drink of alcohol     Types: 1 Cans of beer per week     Comment: on weekends / once or twice per week    Drug use: No    Sexual activity: Yes     Partners: Female     Birth control/protection: None

## 2025-06-08 NOTE — ASSESSMENT & PLAN NOTE
Patient has severe back pain 10/10 in intensity on admission.  T1-T4 compression fractures presenting with worsening back pain radiating down bilateral legs. Very low concern for cauda equina - post void residual was 10mL, he had pin prick sensation. Bilateral LE strength is 3/5 and he is having pain with ambulating. ER discussed with neuro surg on-call in [place] who does not think his new symptoms are not related to his compression fractures.  CT Subacute fractures along the superior endplates of T2-T5 with minimal interval loss of vertebral body height since 5/15/2025 (however less than 25% height loss overall). No retropulsion into the spinal canal or involvement of the posterior elements.  No new fractures. No traumatic subluxation.  Patient with pain and exam unchanged despite multimodal pain regimen.  Therefore patient was hospitalized.  Will get orthopedics consult and also get physical and Occupational Therapy.  Back brace.  Will defer need for further imaging and possible MRI to orthopedics

## 2025-06-08 NOTE — ASSESSMENT & PLAN NOTE
Patient takes PPI in the morning, Pepcid in the evening.  Continue home regimen   Patient's belongings returned

## 2025-06-08 NOTE — ASSESSMENT & PLAN NOTE
Continue Creon 36,000 units 3 times daily with meals.  No evidence of acute pancreatitis clinically

## 2025-06-08 NOTE — LETTER
[facility] 2ND FLOOR MED SURG UNIT  [address]  [place] [address]  Dept: [phone]    Sherin 10, 2025     Patient: Ashwin Wright   YOB: 1975   Date of Visit: 6/8/2025       To Whom it May Concern:    Ashwin Wright is under my professional care. He was seen in the hospital from 6/8/2025 to 06/10/25. He may return to work on 06/17/25 or sooner if able without limitations.    If you have any questions or concerns, please don't hesitate to call.         Sincerely,          Sergio Ochoa MD

## 2025-06-08 NOTE — ED PROVIDER NOTES
Time reflects when diagnosis was documented in both MDM as applicable and the Disposition within this note       Time User Action Codes Description Comment    6/8/2025  3:00 PM Elmira Jorgensen [M54.9] Back pain     6/8/2025  3:00 PM Elmira Jorgensen Add [R26.2] Ambulatory dysfunction     6/8/2025  3:00 PM Elmira Jorgensen Add [R29.898] Leg weakness, bilateral     6/8/2025  3:00 PM Elmira Jorgensen Add [S22.040A] Compression fracture of T4 vertebra, initial encounter (HCC)           ED Disposition       ED Disposition   Admit    Condition   Stable    Date/Time   Sun Jun 8, 2025  3:01 PM    Comment   Case was discussed with CAREY and the patient's admission status was agreed to be Admission Status: observation status to the service of Dr. Michael .               Assessment & Plan       Medical Decision Making  50 y.o. male with a PMH of history of alcohol use, chronic pancreatitis and history of hepatic encephalopathy who presents with severe worsening back pain.   No back pain red flags on history or physical. Presentation not consistent with malignancy (lack of history of malignancy, lack of B symptoms), fracture (no trauma, no bony tenderness to palpation), cauda equina (no bowel or urinary incontinence/retention, no saddle anesthesia) AAA, viscus perforation , pulmonary embolism, renal colic, pyelonephritis (afebrile, no CVAT, no urinary symptoms).     Discussed with neurosurgery who reports that symptoms are not likely due to his current compression fractures in the thoracic region.  Will give patient pain management and reassess.  Post void residual is 10 mL.  There is no perineal numbness.    After first round of pain management patient with no relief.  Plan to obtain CT rule out any retropulsion.     CT IMPRESSION: Subacute fractures along the superior endplates of T2-T5 with minimal interval loss of vertebral body height since 5/15/2025 (however less than 25% height loss overall). No  retropulsion into the spinal canal or involvement of the posterior elements. No new fractures. No traumatic subluxation.    Another round of pain medication given to patient.  Patient with no relief of pain and still having pain with ambulation and movement.  Patient benefit from admission for further evaluation by orthopedics and possible further imaging as well as pain management.  Discussed case with Mervin Michael who will be admitting provider.  Patient is agreeable to admission.  Patient stable for admission.    Amount and/or Complexity of Data Reviewed  Radiology: ordered. Decision-making details documented in ED Course.    Risk  Prescription drug management.  Decision regarding hospitalization.        ED Course as of 06/08/25 1842   Sun Jun 08, 2025   1213 No pain relief after meds     1335 CT spine thoracic and lumbar wo contrast  IMPRESSION:     Subacute fractures along the superior endplates of T2-T5 with minimal interval loss of vertebral body height since 5/15/2025 (however less than 25% height loss overall). No retropulsion into the spinal canal or involvement of the posterior elements.     No new fractures. No traumatic subluxation.            Medications   albuterol (PROVENTIL HFA,VENTOLIN HFA) inhaler 2 puff (has no administration in time range)   carvedilol (COREG) tablet 6.25 mg (has no administration in time range)   pantoprazole (PROTONIX) EC tablet 40 mg (has no administration in time range)   famotidine (PEPCID) tablet 40 mg (has no administration in time range)   folic acid (FOLVITE) tablet 1 mg (has no administration in time range)   lactulose (CHRONULAC) oral solution 30 g (has no administration in time range)   multivitamin-minerals (CENTRUM) tablet 1 tablet (has no administration in time range)   pancrelipase (Lip-Prot-Amyl) (CREON) delayed release capsule 36,000 Units (has no administration in time range)   atorvastatin (LIPITOR) tablet 80 mg (has no administration in time range)    thiamine tablet 250 mg (has no administration in time range)   acetaminophen (TYLENOL) tablet 325 mg (has no administration in time range)   magnesium hydroxide (MILK OF MAGNESIA) oral suspension 30 mL (has no administration in time range)   ondansetron (ZOFRAN) injection 4 mg (has no administration in time range)   aluminum-magnesium hydroxide-simethicone (MAALOX) oral suspension 30 mL (has no administration in time range)   insulin lispro (HumALOG/ADMELOG) 100 units/mL subcutaneous injection 1-5 Units (has no administration in time range)   insulin lispro (HumALOG/ADMELOG) 100 units/mL subcutaneous injection 1-5 Units (has no administration in time range)   insulin glargine (LANTUS) subcutaneous injection 15 Units 0.15 mL (has no administration in time range)   HYDROmorphone (DILAUDID) injection 0.5 mg (has no administration in time range)   oxyCODONE (ROXICODONE) IR tablet 5 mg (has no administration in time range)   enoxaparin (LOVENOX) subcutaneous injection 40 mg (has no administration in time range)   dexamethasone (PF) (DECADRON) injection 10 mg (10 mg Intramuscular Given 6/8/25 1041)   diazepam (VALIUM) injection 2.5 mg (2.5 mg Intramuscular Given 6/8/25 1041)   HYDROmorphone (DILAUDID) injection 1 mg (1 mg Subcutaneous Given 6/8/25 1410)       ED Risk Strat Scores                    No data recorded        SBIRT 20yo+      Flowsheet Row Most Recent Value   Initial Alcohol Screen: US AUDIT-C     1. How often do you have a drink containing alcohol? 1 Filed at: 06/08/2025 0944   2. How many drinks containing alcohol do you have on a typical day you are drinking?  1 Filed at: 06/08/2025 0944   3a. Male UNDER 65: How often do you have five or more drinks on one occasion? 0 Filed at: 06/08/2025 0944   3b. FEMALE Any Age, or MALE 65+: How often do you have 4 or more drinks on one occassion? 0 Filed at: 06/08/2025 0944   Audit-C Score 2 Filed at: 06/08/2025 0944   EUSEBIO: How many times in the past year have  "you...    Used an illegal drug or used a prescription medication for non-medical reasons? Never Filed at: 06/08/2025 0944                            History of Present Illness       Chief Complaint   Patient presents with    Back Pain     Pt c/o worsening back pain, pt has hx of T1-T4 compression fx, wears a brace, recently may have over exerted himself and worsened the pain. Pt now c/o tingling to bilateral legs, denies incontinence        Past Medical History[1]   Past Surgical History[2]   Family History[3]   Social History[4]   E-Cigarette/Vaping    E-Cigarette Use Current Some Day User       E-Cigarette/Vaping Substances    Nicotine Yes     THC No     CBD No     Flavoring No     Other No     Unknown No       I have reviewed and agree with the history as documented.     50 y.o. male with a PMH of history of alcohol use, chronic pancreatitis and history of hepatic encephalopathy who presents with severe worsening back pain.  Patient was recently in [place] for detox. Patient reports he bent down and felt something \"pop\" and pain started radiating down his back and down both his legs. He now experiencing pain with ambulating, movement, and cannot get comfortable. Denies any urinary or bowel in continence, saddle anesthesia, fever, chills, headaches, vision changes, dizziness, chest pain, abdominal pain, or any other complaints.       Back Pain  Associated symptoms: weakness    Associated symptoms: no abdominal pain, no chest pain, no dysuria and no fever        Review of Systems   Constitutional:  Negative for chills and fever.   HENT:  Negative for ear pain and sore throat.    Eyes:  Negative for pain and visual disturbance.   Respiratory:  Negative for cough and shortness of breath.    Cardiovascular:  Negative for chest pain and palpitations.   Gastrointestinal:  Negative for abdominal pain and vomiting.   Genitourinary:  Negative for dysuria and hematuria.   Musculoskeletal:  Positive for back pain and " gait problem. Negative for arthralgias.   Skin:  Negative for color change and rash.   Neurological:  Positive for weakness. Negative for seizures and syncope.   All other systems reviewed and are negative.          Objective       ED Triage Vitals   Temperature Pulse Blood Pressure Respirations SpO2 Patient Position - Orthostatic VS   06/08/25 0939 06/08/25 0939 06/08/25 0939 06/08/25 0939 06/08/25 0939 06/08/25 0939   98.6 °F (37 °C) 96 (!) 189/113 20 98 % Sitting      Temp Source Heart Rate Source BP Location FiO2 (%) Pain Score    06/08/25 0939 06/08/25 0939 06/08/25 0939 -- 06/08/25 1410    Temporal Monitor Left arm  9      Vitals      Date and Time Temp Pulse SpO2 Resp BP Pain Score FACES Pain Rating User   06/08/25 1801 -- 88 -- -- 147/93 9 --    06/08/25 1716 -- -- 97 % -- -- -- --    06/08/25 1629 -- -- 96 % -- -- 9 --    06/08/25 1621 -- -- -- -- -- 9 --    06/08/25 1620 -- -- -- 18 -- -- --    06/08/25 1620 98.6 °F (37 °C) 86 96 % -- 162/73 -- -- [facility]   06/08/25 1413 -- 83 98 % 16 145/75 -- -- [facility]   06/08/25 1410 -- -- -- -- -- 9 -- [facility]   06/08/25 1130 -- 77 95 % 16 160/76 -- -- [facility]   06/08/25 1045 -- 86 97 % 18 171/86 -- -- [facility]   06/08/25 0939 98.6 °F (37 °C) 96 98 % 20 189/113 -- -- TO            Physical Exam  Vitals and nursing note reviewed.   Constitutional:       General: He is not in acute distress.     Appearance: He is well-developed. He is ill-appearing.      Comments: Patient uncomfortable.   HENT:      Head: Normocephalic and atraumatic.     Eyes:      Conjunctiva/sclera: Conjunctivae normal.       Cardiovascular:      Rate and Rhythm: Normal rate and regular rhythm.      Heart sounds: No murmur heard.  Pulmonary:      Effort: Pulmonary effort is normal. No respiratory distress.      Breath sounds: Normal breath sounds.   Abdominal:      Palpations: Abdomen is soft.      Tenderness: There is no abdominal tenderness.     Musculoskeletal:         General: No swelling.      Cervical  back: Neck supple. No tenderness.      Thoracic back: Tenderness and bony tenderness present. Decreased range of motion.      Lumbar back: Tenderness present. No bony tenderness. Decreased range of motion.      Comments: 3 out of 5 bilateral lower extremity strength.  Full sensation.     Skin:     General: Skin is warm and dry.      Capillary Refill: Capillary refill takes less than 2 seconds.     Neurological:      Mental Status: He is alert and oriented to person, place, and time.      Cranial Nerves: Cranial nerves 2-12 are intact.      Sensory: Sensation is intact.      Comments: 3 out of 5 strength to bilateral lower extremities.   Psychiatric:         Mood and Affect: Mood normal.         Results Reviewed       None            CT spine thoracic and lumbar wo contrast   Final Interpretation by Caro Rm MD (06/08 1330)      Subacute fractures along the superior endplates of T2-T5 with minimal interval loss of vertebral body height since 5/15/2025 (however less than 25% height loss overall). No retropulsion into the spinal canal or involvement of the posterior elements.      No new fractures. No traumatic subluxation.               Workstation performed: [id number]             Procedures    ED Medication and Procedure Management   Prior to Admission Medications   Prescriptions Last Dose Informant Patient Reported? Taking?   Choline Fenofibrate (Fenofibric Acid) 135 MG CPDR  Self, Spouse/Significant Other No No   Sig: TAKE ONE CAPSULE BY MOUTH DAILY   Empagliflozin 10 MG TABS  Self, Spouse/Significant Other Yes No   Sig: Take 10 mg by mouth every morning   Insulin Pen Needle (Pen Needles) 32G X 5 MM MISC  Self, Spouse/Significant Other Yes No   Multiple Vitamins-Minerals (MULTIVITAMIN ADULTS PO)  Self, Spouse/Significant Other Yes No   Sig: Take by mouth daily after breakfast   Pancrelipase, Lip-Prot-Amyl, (Creon) 00693-384496 units CPEP  Self, Spouse/Significant Other No No   Sig: TAKE 1 CAPSULE (36,000  UNITS TOTAL) BY MOUTH 3 (THREE) TIMES A DAY BEFORE MEALS   Thiamine HCl (vitamin B-1) 250 MG tablet  Self, Spouse/Significant Other Yes No   Sig: Take 250 mg by mouth daily   albuterol (PROVENTIL HFA,VENTOLIN HFA) 90 mcg/act inhaler  Self, Spouse/Significant Other No No   Sig: Inhale 2 puffs every 6 (six) hours as needed for wheezing   carvedilol (COREG) 3.125 mg tablet  Self, Spouse/Significant Other No No   Sig: Take 2 tablets (6.25 mg total) by mouth 2 (two) times a day with meals   esomeprazole (NexIUM) 40 MG capsule  Self, Spouse/Significant Other No No   Sig: Take 1 capsule (40 mg total) by mouth if needed (heartburn)   famotidine (PEPCID) 40 MG tablet  Self, Spouse/Significant Other Yes No   Sig: Take 40 mg by mouth daily at bedtime as needed   folic acid (FOLVITE) 1 mg tablet   No No   Sig: Take 1 tablet (1 mg total) by mouth daily   lactulose (CHRONULAC) 10 g/15 mL solution  Self, Spouse/Significant Other No No   Sig: Take 45 mL (30 g total) by mouth 2 (two) times a day Titrate dose to have 2-3 bowel movements daily. Take an additional dose if you are not having 2-3 bowel movements daily.   rifaximin (XIFAXAN) 550 mg tablet  Self, Spouse/Significant Other No No   Sig: Take 1 tablet (550 mg total) by mouth every 12 (twelve) hours   rosuvastatin (CRESTOR) 40 MG tablet  Self, Spouse/Significant Other No No   Sig: TAKE ONE TABLET BY MOUTH EVERY DAY      Facility-Administered Medications: None     Current Discharge Medication List        CONTINUE these medications which have NOT CHANGED    Details   albuterol (PROVENTIL HFA,VENTOLIN HFA) 90 mcg/act inhaler Inhale 2 puffs every 6 (six) hours as needed for wheezing  Qty: 54 g, Refills: 0    Comments: Substitution to a formulary equivalent within the same pharmaceutical class is authorized.  Associated Diagnoses: Uncomplicated asthma, unspecified asthma severity, unspecified whether persistent      carvedilol (COREG) 3.125 mg tablet Take 2 tablets (6.25 mg total)  by mouth 2 (two) times a day with meals  Qty: 60 tablet, Refills: 6    Associated Diagnoses: Secondary esophageal varices with bleeding (HCC)      Choline Fenofibrate (Fenofibric Acid) 135 MG CPDR TAKE ONE CAPSULE BY MOUTH DAILY  Qty: 30 capsule, Refills: 5    Associated Diagnoses: Mixed hyperlipidemia; Hypertriglyceridemia      Empagliflozin 10 MG TABS Take 10 mg by mouth every morning      esomeprazole (NexIUM) 40 MG capsule Take 1 capsule (40 mg total) by mouth if needed (heartburn)  Qty: 30 capsule, Refills: 6    Associated Diagnoses: Chronic superficial gastritis without bleeding      famotidine (PEPCID) 40 MG tablet Take 40 mg by mouth daily at bedtime as needed      folic acid (FOLVITE) 1 mg tablet Take 1 tablet (1 mg total) by mouth daily  Qty: 30 tablet, Refills: 0    Associated Diagnoses: Alcohol withdrawal syndrome, with unspecified complication (HCC)      Insulin Pen Needle (Pen Needles) 32G X 5 MM MISC       lactulose (CHRONULAC) 10 g/15 mL solution Take 45 mL (30 g total) by mouth 2 (two) times a day Titrate dose to have 2-3 bowel movements daily. Take an additional dose if you are not having 2-3 bowel movements daily.  Qty: 2700 mL, Refills: 2    Associated Diagnoses: Hepatic encephalopathy (HCC)      Multiple Vitamins-Minerals (MULTIVITAMIN ADULTS PO) Take by mouth daily after breakfast      Pancrelipase, Lip-Prot-Amyl, (Creon) 69582-043272 units CPEP TAKE 1 CAPSULE (36,000 UNITS TOTAL) BY MOUTH 3 (THREE) TIMES A DAY BEFORE MEALS  Qty: 270 capsule, Refills: 1    Associated Diagnoses: Chronic pancreatitis, unspecified pancreatitis type (HCC); Abdominal distention; Constipation, acute      rifaximin (XIFAXAN) 550 mg tablet Take 1 tablet (550 mg total) by mouth every 12 (twelve) hours  Qty: 180 tablet, Refills: 2    Associated Diagnoses: Hepatic encephalopathy (HCC)      rosuvastatin (CRESTOR) 40 MG tablet TAKE ONE TABLET BY MOUTH EVERY DAY  Qty: 90 tablet, Refills: 1    Associated Diagnoses: Mixed  hyperlipidemia      Thiamine HCl (vitamin B-1) 250 MG tablet Take 250 mg by mouth daily           No discharge procedures on file.  ED SEPSIS DOCUMENTATION   Time reflects when diagnosis was documented in both MDM as applicable and the Disposition within this note       Time User Action Codes Description Comment    6/8/2025  3:00 PM Elmira Jorgensen Add [M54.9] Back pain     6/8/2025  3:00 PM Elmira Jorgensen Add [R26.2] Ambulatory dysfunction     6/8/2025  3:00 PM Elmira Jorgensen Add [R29.898] Leg weakness, bilateral     6/8/2025  3:00 PM Elmira Jorgensen Add [S22.040A] Compression fracture of T4 vertebra, initial encounter (HCC)                    [1]   Past Medical History:  Diagnosis Date    Alcoholism (HCC)     Arthritis     Asthma     Chronic pain disorder     Chronic pancreatitis (HCC)     Cirrhosis (HCC)     early cirrhosis    GERD (gastroesophageal reflux disease)     History of COVID-19 01/2022    mild s/s    Hyperlipidemia     Hypertension     Liver abscess     Liver disease     Meniscus tear     left knee work injury last assessed 08/24/2016    Pancreatitis     Pneumonia     Rotator cuff tear     Stomach disorder     Chronic gastritus   [2]   Past Surgical History:  Procedure Laterality Date    CELIAC PLEXUS BLOCK Bilateral 10/29/2018    Procedure: SPLANCHNIC NERVE BLOCK;  Surgeon: Ramiro Chinchilla MD;  [facility];  Service: Pain Management     CELIAC PLEXUS BLOCK Bilateral 01/10/2019    Procedure: SPLANCHNIC NERVE BLOCK;  Surgeon: Ramiro Chinchilla MD;  [facility];  Service: Pain Management     CHOLECYSTECTOMY      COLONOSCOPY      ESOPHAGOGASTRODUODENOSCOPY N/A 11/16/2017    Procedure: ESOPHAGOGASTRODUODENOSCOPY (EGD);  Surgeon: Ever Bonner MD;  [facility]: [facility];  Service: Gastroenterology    ESOPHAGOGASTRODUODENOSCOPY N/A 04/19/2018    Procedure: ESOPHAGOGASTRODUODENOSCOPY (EGD);  Surgeon: Orestes Forrest MD;  [facility];  Service: Gastroenterology     ESOPHAGOGASTRODUODENOSCOPY N/A 05/10/2018    Procedure: ESOPHAGOGASTRODUODENOSCOPY (EGD);  Surgeon: Vaibhva Matthew MD;  [facility];  Service: Gastroenterology    ESOPHAGUS SURGERY  01/17/2025    nubia galindo tear repain    HERNIA REPAIR Bilateral 02/03/2023    Procedure: REPAIR HERNIA INGUINAL, LAPAROSCOPIC,;  Surgeon: Juanjo Travis DO;  [facility]: [facility];  Service: General    IR TEMPORARY DIALYSIS CATHETER PLACEMENT  02/28/2019    KNEE ARTHROSCOPY Bilateral     KNEE ARTHROSCOPY Right 2009    cibishino last assessed 08/24/2016    KNEE ARTHROSCOPY Right 07/01/2019    LIVER SURGERY      NERVE BLOCK Bilateral 05/29/2018    Procedure: SPLANCHNIC NERVE BLOCK;  Surgeon: Ramiro Chinchilla MD;  [facility];  Service: Pain Management     PANCREAS SURGERY      stents    PANCREATIC CYST EXCISION      NH INJECTION ANES LMBR/THRC PARAVERTBRL SYMPATHETIC Bilateral 12/28/2017    Procedure: SPLANCHNIC NERVE BLOCK;  Surgeon: Ramiro Chinchilla MD;  [facility];  Service: Pain Management     NH INJX ANES CELIAC PLEXUS W/WO RADIOLOGIC MONITRNG Bilateral 05/09/2017    Procedure: CELIAC PLEXUS BLOCK ;  Surgeon: Ramiro Chinchilla MD;  [facility];  Service: Pain Management     NH INJX ANES CELIAC PLEXUS W/WO RADIOLOGIC MONITRNG Bilateral 06/01/2017    Procedure: SPLANCHNIC NERVE BLOCK at T12;  Surgeon: Ramiro Chinchilla MD;  [facility];  Service: Pain Management     NH INJX ANES CELIAC PLEXUS W/WO RADIOLOGIC MONITRNG Bilateral 08/08/2017    Procedure: BILATERAL SPLANCHNIC NERVE BLOCK T12;  Surgeon: Ramiro Chinchilla MD;  [facility];  Service: Pain Management     NH INJX ANES CELIAC PLEXUS W/WO RADIOLOGIC MONITRNG Bilateral 04/18/2019    Procedure: BLOCK / INJECTION CELIAC PLEXUS;  Surgeon: Ramiro Chinchilla MD;  [facility];  Service: Pain Management     NH INJX ANES CELIAC PLEXUS W/WO RADIOLOGIC MONITRNG Bilateral 08/20/2019    Procedure: SPLANCHNIC NERVE BLOCK;  Surgeon:  Ramiro Chinchilla MD;  [facility];  Service: Pain Management     VT INJX ANES CELIAC PLEXUS W/WO RADIOLOGIC MONITRNG Bilateral 10/17/2019    Procedure: SPLANCHNIC NERVE BLOCK;  Surgeon: Ramiro Chinchilla MD;  [facility];  Service: Pain Management     VT LAPAROSCOPY SURG CHOLECYSTECTOMY N/A 2017    Procedure: LAPAROSCOPIC CHOLECYSTECTOMY, IOC, POSSIBLE OPEN.;  Surgeon: Suresh Lang MD;  [facility];  Service: General    VT SURGICAL ARTHROSCOPY SHOULDER W/ROTATOR CUFF RPR Left 2023    Procedure: Left Shoulder Arthroscopic Rotator Cuff Repair, Open Subpectoral Biceps Tenodesis, Acromioplasty, Extensive Debridement;  Surgeon: Toro Healy DO;  [facility];  Service: Orthopedics    ROTATOR CUFF REPAIR Right     SHOULDER ARTHROSCOPY      SHOULDER ARTHROSCOPY Right     SHOULDER SURGERY      TENDON REPAIR      Right shoulder   [3]   Family History  Problem Relation Name Age of Onset    Cirrhosis Mother      Heart disease Other grandfather         cardiac disorder    Cancer Other grandmother    [4]   Social History  Tobacco Use    Smoking status: Former     Current packs/day: 0.00     Average packs/day: 1 pack/day for 20.0 years (20.0 ttl pk-yrs)     Types: Cigarettes     Start date: 3/15/2001     Quit date: 3/15/2021     Years since quittin.2    Smokeless tobacco: Never   Vaping Use    Vaping status: Some Days    Substances: Nicotine   Substance Use Topics    Alcohol use: Yes     Alcohol/week: 1.0 standard drink of alcohol     Types: 1 Cans of beer per week     Comment: on weekends / once or twice per week    Drug use: No        Elmira Jorgensen PA-C  [phone]

## 2025-06-08 NOTE — ED NOTES
Attempted 4 times for IV access, unsuccessful, provider made aware.      Amaya Jones, RN  06/08/25 1409

## 2025-06-08 NOTE — ASSESSMENT & PLAN NOTE
"Lab Results   Component Value Date    HGBA1C 5.6 05/12/2025       No results for input(s): \"POCGLU\" in the last 72 hours.    Blood Sugar Average: Last 72 hrs:    Hold Jardiance.  Lantus 15 units subcu daily in the morning.  Patient does not take any mealtime insulins.  Will do blood sugar monitoring and sliding scale insulin coverage  "

## 2025-06-08 NOTE — PLAN OF CARE
Problem: PAIN - ADULT  Goal: Verbalizes/displays adequate comfort level or baseline comfort level  Description: Interventions:  - Encourage patient to monitor pain and request assistance  - Assess pain using appropriate pain scale  - Administer analgesics as ordered based on type and severity of pain and evaluate response  - Implement non-pharmacological measures as appropriate and evaluate response  - Consider cultural and social influences on pain and pain management  - Notify physician/advanced practitioner if interventions unsuccessful or patient reports new pain  - Educate patient/family on pain management process including their role and importance of  reporting pain   - Provide non-pharmacologic/complimentary pain relief interventions  Outcome: Progressing     Problem: INFECTION - ADULT  Goal: Absence or prevention of progression during hospitalization  Description: INTERVENTIONS:  - Assess and monitor for signs and symptoms of infection  - Monitor lab/diagnostic results  - Monitor all insertion sites, i.e. indwelling lines, tubes, and drains  - Monitor endotracheal if appropriate and nasal secretions for changes in amount and color  - [place] appropriate cooling/warming therapies per order  - Administer medications as ordered  - Instruct and encourage patient and family to use good hand hygiene technique  - Identify and instruct in appropriate isolation precautions for identified infection/condition  Outcome: Progressing  Goal: Absence of fever/infection during neutropenic period  Description: INTERVENTIONS:  - Monitor WBC  - Perform strict hand hygiene  - Limit to healthy visitors only  - No plants, dried, fresh or silk flowers with lake in patient room  Outcome: Progressing     Problem: SAFETY ADULT  Goal: Patient will remain free of falls  Description: INTERVENTIONS:  - Educate patient/family on patient safety including physical limitations  - Instruct patient to call for assistance with activity   -  Consider consulting OT/PT to assist with strengthening/mobility based on AM PAC & JH-HLM score  - Consult OT/PT to assist with strengthening/mobility   - Keep Call bell within reach  - Keep bed low and locked with side rails adjusted as appropriate  - Keep care items and personal belongings within reach  - Initiate and maintain comfort rounds  - Make Fall Risk Sign visible to staff  - Offer Toileting every  Hours, in advance of need  - Initiate/Maintain alarm  - Obtain necessary fall risk management equipment:   - Apply yellow socks and bracelet for high fall risk patients  - Consider moving patient to room near nurses station  Outcome: Progressing  Goal: Maintain or return to baseline ADL function  Description: INTERVENTIONS:  -  Assess patient's ability to carry out ADLs; assess patient's baseline for ADL function and identify physical deficits which impact ability to perform ADLs (bathing, care of mouth/teeth, toileting, grooming, dressing, etc.)  - Assess/evaluate cause of self-care deficits   - Assess range of motion  - Assess patient's mobility; develop plan if impaired  - Assess patient's need for assistive devices and provide as appropriate  - Encourage maximum independence but intervene and supervise when necessary  - Involve family in performance of ADLs  - Assess for home care needs following discharge   - Consider OT consult to assist with ADL evaluation and planning for discharge  - Provide patient education as appropriate  - Monitor functional capacity and physical performance, use of AM PAC & JH-HLM   - Monitor gait, balance and fatigue with ambulation    Outcome: Progressing  Goal: Maintains/Returns to pre admission functional level  Description: INTERVENTIONS:  - Perform AM-PAC 6 Click Basic Mobility/ Daily Activity assessment daily.  - Set and communicate daily mobility goal to care team and patient/family/caregiver.   - Collaborate with rehabilitation services on mobility goals if consulted  -  Perform Range of Motion  times a day.  - Reposition patient every  hours.  - Dangle patient  times a day  - Stand patient  times a day  - Ambulate patient  times a day  - Out of bed to chair  times a day   - Out of bed for meals times a day  - Out of bed for toileting  - Record patient progress and toleration of activity level   Outcome: Progressing     Problem: DISCHARGE PLANNING  Goal: Discharge to home or other facility with appropriate resources  Description: INTERVENTIONS:  - Identify barriers to discharge w/patient and caregiver  - Arrange for needed discharge resources and transportation as appropriate  - Identify discharge learning needs (meds, wound care, etc.)  - Arrange for interpretive services to assist at discharge as needed  - Refer to Case Management Department for coordinating discharge planning if the patient needs post-hospital services based on physician/advanced practitioner order or complex needs related to functional status, cognitive ability, or social support system  Outcome: Progressing     Problem: Knowledge Deficit  Goal: Patient/family/caregiver demonstrates understanding of disease process, treatment plan, medications, and discharge instructions  Description: Complete learning assessment and assess knowledge base.  Interventions:  - Provide teaching at level of understanding  - Provide teaching via preferred learning methods  Outcome: Progressing

## 2025-06-09 LAB
ALBUMIN SERPL BCG-MCNC: 3.4 G/DL (ref 3.5–5)
ALP SERPL-CCNC: 145 U/L (ref 34–104)
ALT SERPL W P-5'-P-CCNC: 18 U/L (ref 7–52)
ANION GAP SERPL CALCULATED.3IONS-SCNC: 7 MMOL/L (ref 4–13)
AST SERPL W P-5'-P-CCNC: 42 U/L (ref 13–39)
BASOPHILS # BLD AUTO: 0 THOUSANDS/ÂΜL (ref 0–0.1)
BASOPHILS NFR BLD AUTO: 0 % (ref 0–1)
BILIRUB SERPL-MCNC: 2.21 MG/DL (ref 0.2–1)
BUN SERPL-MCNC: 5 MG/DL (ref 5–25)
CALCIUM ALBUM COR SERPL-MCNC: 9.4 MG/DL (ref 8.3–10.1)
CALCIUM SERPL-MCNC: 8.9 MG/DL (ref 8.4–10.2)
CHLORIDE SERPL-SCNC: 102 MMOL/L (ref 96–108)
CO2 SERPL-SCNC: 27 MMOL/L (ref 21–32)
CREAT SERPL-MCNC: 0.44 MG/DL (ref 0.6–1.3)
EOSINOPHIL # BLD AUTO: 0.02 THOUSAND/ÂΜL (ref 0–0.61)
EOSINOPHIL NFR BLD AUTO: 0 % (ref 0–6)
ERYTHROCYTE [DISTWIDTH] IN BLOOD BY AUTOMATED COUNT: 22.6 % (ref 11.6–15.1)
GFR SERPL CREATININE-BSD FRML MDRD: 131 ML/MIN/1.73SQ M
GLUCOSE P FAST SERPL-MCNC: 160 MG/DL (ref 65–99)
GLUCOSE SERPL-MCNC: 115 MG/DL (ref 65–140)
GLUCOSE SERPL-MCNC: 120 MG/DL (ref 65–140)
GLUCOSE SERPL-MCNC: 139 MG/DL (ref 65–140)
GLUCOSE SERPL-MCNC: 149 MG/DL (ref 65–140)
GLUCOSE SERPL-MCNC: 157 MG/DL (ref 65–140)
GLUCOSE SERPL-MCNC: 160 MG/DL (ref 65–140)
GLUCOSE SERPL-MCNC: 196 MG/DL (ref 65–140)
HCT VFR BLD AUTO: 33.7 % (ref 36.5–49.3)
HGB BLD-MCNC: 10.2 G/DL (ref 12–17)
IMM GRANULOCYTES # BLD AUTO: 0.02 THOUSAND/UL (ref 0–0.2)
IMM GRANULOCYTES NFR BLD AUTO: 0 % (ref 0–2)
LYMPHOCYTES # BLD AUTO: 1.02 THOUSANDS/ÂΜL (ref 0.6–4.47)
LYMPHOCYTES NFR BLD AUTO: 18 % (ref 14–44)
MCH RBC QN AUTO: 23.9 PG (ref 26.8–34.3)
MCHC RBC AUTO-ENTMCNC: 30.3 G/DL (ref 31.4–37.4)
MCV RBC AUTO: 79 FL (ref 82–98)
MONOCYTES # BLD AUTO: 0.55 THOUSAND/ÂΜL (ref 0.17–1.22)
MONOCYTES NFR BLD AUTO: 10 % (ref 4–12)
NEUTROPHILS # BLD AUTO: 3.92 THOUSANDS/ÂΜL (ref 1.85–7.62)
NEUTS SEG NFR BLD AUTO: 72 % (ref 43–75)
NRBC BLD AUTO-RTO: 0 /100 WBCS
PLATELET # BLD AUTO: 79 THOUSANDS/UL (ref 149–390)
PMV BLD AUTO: 10.8 FL (ref 8.9–12.7)
POTASSIUM SERPL-SCNC: 3.5 MMOL/L (ref 3.5–5.3)
PROT SERPL-MCNC: 6.4 G/DL (ref 6.4–8.4)
RBC # BLD AUTO: 4.26 MILLION/UL (ref 3.88–5.62)
SODIUM SERPL-SCNC: 136 MMOL/L (ref 135–147)
WBC # BLD AUTO: 5.53 THOUSAND/UL (ref 4.31–10.16)

## 2025-06-09 PROCEDURE — 82948 REAGENT STRIP/BLOOD GLUCOSE: CPT

## 2025-06-09 PROCEDURE — 94762 N-INVAS EAR/PLS OXIMTRY CONT: CPT

## 2025-06-09 PROCEDURE — 80053 COMPREHEN METABOLIC PANEL: CPT | Performed by: INTERNAL MEDICINE

## 2025-06-09 PROCEDURE — 85025 COMPLETE CBC W/AUTO DIFF WBC: CPT | Performed by: INTERNAL MEDICINE

## 2025-06-09 PROCEDURE — 97166 OT EVAL MOD COMPLEX 45 MIN: CPT

## 2025-06-09 PROCEDURE — 99233 SBSQ HOSP IP/OBS HIGH 50: CPT | Performed by: STUDENT IN AN ORGANIZED HEALTH CARE EDUCATION/TRAINING PROGRAM

## 2025-06-09 PROCEDURE — 97162 PT EVAL MOD COMPLEX 30 MIN: CPT

## 2025-06-09 RX ORDER — LIDOCAINE 50 MG/G
1 PATCH TOPICAL DAILY
Status: DISCONTINUED | OUTPATIENT
Start: 2025-06-09 | End: 2025-06-10 | Stop reason: HOSPADM

## 2025-06-09 RX ADMIN — FOLIC ACID 1 MG: 1 TABLET ORAL at 09:50

## 2025-06-09 RX ADMIN — ATORVASTATIN CALCIUM 80 MG: 40 TABLET, FILM COATED ORAL at 18:02

## 2025-06-09 RX ADMIN — HYDROMORPHONE HYDROCHLORIDE 0.5 MG: 1 INJECTION, SOLUTION INTRAMUSCULAR; INTRAVENOUS; SUBCUTANEOUS at 09:50

## 2025-06-09 RX ADMIN — FAMOTIDINE 40 MG: 20 TABLET, FILM COATED ORAL at 22:48

## 2025-06-09 RX ADMIN — LACTULOSE 30 G: 10 SOLUTION ORAL at 09:50

## 2025-06-09 RX ADMIN — LACTULOSE 30 G: 10 SOLUTION ORAL at 18:02

## 2025-06-09 RX ADMIN — OXYCODONE HYDROCHLORIDE 5 MG: 5 TABLET ORAL at 13:49

## 2025-06-09 RX ADMIN — HYDROMORPHONE HYDROCHLORIDE 0.5 MG: 1 INJECTION, SOLUTION INTRAMUSCULAR; INTRAVENOUS; SUBCUTANEOUS at 14:49

## 2025-06-09 RX ADMIN — LIDOCAINE 5% 1 PATCH: 700 PATCH TOPICAL at 18:01

## 2025-06-09 RX ADMIN — PANCRELIPASE 36000 UNITS: 120000; 24000; 76000 CAPSULE, DELAYED RELEASE PELLETS ORAL at 12:27

## 2025-06-09 RX ADMIN — HYDROMORPHONE HYDROCHLORIDE 0.5 MG: 1 INJECTION, SOLUTION INTRAMUSCULAR; INTRAVENOUS; SUBCUTANEOUS at 23:16

## 2025-06-09 RX ADMIN — THIAMINE HCL TAB 100 MG 250 MG: 100 TAB at 09:50

## 2025-06-09 RX ADMIN — ACETAMINOPHEN 325 MG: 325 TABLET ORAL at 00:08

## 2025-06-09 RX ADMIN — PANCRELIPASE 36000 UNITS: 120000; 24000; 76000 CAPSULE, DELAYED RELEASE PELLETS ORAL at 18:05

## 2025-06-09 RX ADMIN — OXYCODONE HYDROCHLORIDE 5 MG: 5 TABLET ORAL at 06:37

## 2025-06-09 RX ADMIN — CARVEDILOL 6.25 MG: 6.25 TABLET, FILM COATED ORAL at 09:48

## 2025-06-09 RX ADMIN — CARVEDILOL 6.25 MG: 6.25 TABLET, FILM COATED ORAL at 18:02

## 2025-06-09 RX ADMIN — PANTOPRAZOLE SODIUM 40 MG: 40 TABLET, DELAYED RELEASE ORAL at 05:05

## 2025-06-09 RX ADMIN — OXYCODONE HYDROCHLORIDE 5 MG: 5 TABLET ORAL at 19:44

## 2025-06-09 RX ADMIN — ENOXAPARIN SODIUM 40 MG: 40 INJECTION SUBCUTANEOUS at 09:50

## 2025-06-09 RX ADMIN — PANCRELIPASE 36000 UNITS: 120000; 24000; 76000 CAPSULE, DELAYED RELEASE PELLETS ORAL at 09:49

## 2025-06-09 RX ADMIN — HYDROMORPHONE HYDROCHLORIDE 0.5 MG: 1 INJECTION, SOLUTION INTRAMUSCULAR; INTRAVENOUS; SUBCUTANEOUS at 02:10

## 2025-06-09 RX ADMIN — INSULIN GLARGINE 15 UNITS: 100 INJECTION, SOLUTION SUBCUTANEOUS at 09:49

## 2025-06-09 RX ADMIN — INSULIN LISPRO 1 UNITS: 100 INJECTION, SOLUTION INTRAVENOUS; SUBCUTANEOUS at 09:47

## 2025-06-09 RX ADMIN — Medication 1 TABLET: at 09:50

## 2025-06-09 RX ADMIN — OXYCODONE HYDROCHLORIDE 5 MG: 5 TABLET ORAL at 00:08

## 2025-06-09 NOTE — ASSESSMENT & PLAN NOTE
Lab Results   Component Value Date    HGBA1C 5.6 05/12/2025       Recent Labs     06/08/25  2111 06/09/25  0805 06/09/25  0947 06/09/25  1129   POCGLU 200* 149* 196* 139       Blood Sugar Average: Last 72 hrs:  (P) 184.8  Hold Jardiance.  Lantus 15 units subcu daily in the morning.  Patient does not take any mealtime insulins.  Will do blood sugar monitoring and sliding scale insulin coverage

## 2025-06-09 NOTE — PLAN OF CARE
Problem: PAIN - ADULT  Goal: Verbalizes/displays adequate comfort level or baseline comfort level  Description: Interventions:  - Encourage patient to monitor pain and request assistance  - Assess pain using appropriate pain scale  - Administer analgesics as ordered based on type and severity of pain and evaluate response  - Implement non-pharmacological measures as appropriate and evaluate response  - Consider cultural and social influences on pain and pain management  - Notify physician/advanced practitioner if interventions unsuccessful or patient reports new pain  - Educate patient/family on pain management process including their role and importance of  reporting pain   - Provide non-pharmacologic/complimentary pain relief interventions  Outcome: Progressing     Problem: INFECTION - ADULT  Goal: Absence or prevention of progression during hospitalization  Description: INTERVENTIONS:  - Assess and monitor for signs and symptoms of infection  - Monitor lab/diagnostic results  - Monitor all insertion sites, i.e. indwelling lines, tubes, and drains  - Monitor endotracheal if appropriate and nasal secretions for changes in amount and color  - [place] appropriate cooling/warming therapies per order  - Administer medications as ordered  - Instruct and encourage patient and family to use good hand hygiene technique  - Identify and instruct in appropriate isolation precautions for identified infection/condition  Outcome: Progressing  Goal: Absence of fever/infection during neutropenic period  Description: INTERVENTIONS:  - Monitor WBC  - Perform strict hand hygiene  - Limit to healthy visitors only  - No plants, dried, fresh or silk flowers with lake in patient room  Outcome: Progressing     Problem: SAFETY ADULT  Goal: Patient will remain free of falls  Description: INTERVENTIONS:  - Educate patient/family on patient safety including physical limitations  - Instruct patient to call for assistance with activity   -  Consider consulting OT/PT to assist with strengthening/mobility based on AM PAC & JH-HLM score  - Consult OT/PT to assist with strengthening/mobility   - Keep Call bell within reach  - Keep bed low and locked with side rails adjusted as appropriate  - Keep care items and personal belongings within reach  - Initiate and maintain comfort rounds  - Make Fall Risk Sign visible to staff  - Offer Toileting every  Hours, in advance of need  - Initiate/Maintain alarm  - Obtain necessary fall risk management equipment:   - Apply yellow socks and bracelet for high fall risk patients  - Consider moving patient to room near nurses station  Outcome: Progressing  Goal: Maintain or return to baseline ADL function  Description: INTERVENTIONS:  -  Assess patient's ability to carry out ADLs; assess patient's baseline for ADL function and identify physical deficits which impact ability to perform ADLs (bathing, care of mouth/teeth, toileting, grooming, dressing, etc.)  - Assess/evaluate cause of self-care deficits   - Assess range of motion  - Assess patient's mobility; develop plan if impaired  - Assess patient's need for assistive devices and provide as appropriate  - Encourage maximum independence but intervene and supervise when necessary  - Involve family in performance of ADLs  - Assess for home care needs following discharge   - Consider OT consult to assist with ADL evaluation and planning for discharge  - Provide patient education as appropriate  - Monitor functional capacity and physical performance, use of AM PAC & JH-HLM   - Monitor gait, balance and fatigue with ambulation    Outcome: Progressing  Goal: Maintains/Returns to pre admission functional level  Description: INTERVENTIONS:  - Perform AM-PAC 6 Click Basic Mobility/ Daily Activity assessment daily.  - Set and communicate daily mobility goal to care team and patient/family/caregiver.   - Collaborate with rehabilitation services on mobility goals if consulted  -  Perform Range of Motion  times a day.  - Reposition patient every  hours.  - Dangle patient  times a day  - Stand patient  times a day  - Ambulate patient  times a day  - Out of bed to chair  times a day   - Out of bed for meals times a day  - Out of bed for toileting  - Record patient progress and toleration of activity level   Outcome: Progressing     Problem: DISCHARGE PLANNING  Goal: Discharge to home or other facility with appropriate resources  Description: INTERVENTIONS:  - Identify barriers to discharge w/patient and caregiver  - Arrange for needed discharge resources and transportation as appropriate  - Identify discharge learning needs (meds, wound care, etc.)  - Arrange for interpretive services to assist at discharge as needed  - Refer to Case Management Department for coordinating discharge planning if the patient needs post-hospital services based on physician/advanced practitioner order or complex needs related to functional status, cognitive ability, or social support system  Outcome: Progressing     Problem: Knowledge Deficit  Goal: Patient/family/caregiver demonstrates understanding of disease process, treatment plan, medications, and discharge instructions  Description: Complete learning assessment and assess knowledge base.  Interventions:  - Provide teaching at level of understanding  - Provide teaching via preferred learning methods  Outcome: Progressing

## 2025-06-09 NOTE — UTILIZATION REVIEW
Initial Clinical Review    Admission: Date/Time/Statement:   Admission Orders (From admission, onward)       Ordered        06/08/25 1501  Place in Observation  Once                          Orders Placed This Encounter   Procedures    Place in Observation     Standing Status:   Standing     Number of Occurrences:   1     Level of Care:   Med Surg [16]     ED Arrival Information       Expected   -    Arrival   6/8/2025 09:31    Acuity   Urgent              Means of arrival   Walk-In    Escorted by   Self    Service   Hospitalist    Admission type   Emergency              Arrival complaint   Back Pain             Chief Complaint   Patient presents with    Back Pain     Pt c/o worsening back pain, pt has hx of T1-T4 compression fx, wears a brace, recently may have over exerted himself and worsened the pain. Pt now c/o tingling to bilateral legs, denies incontinence        Initial Presentation: 50 y.o. male presents to ED from home with severe worsening back pain.  Recently  admitted in Detox  unit. Imaging shows T 1- T 4 compression fractures.  Pain radiates down both legs.  Pain  10/10 and improves with meds.  PMH  is  alcohol use,  GERD,  DM2,  chronic pancreatitis and hepatic encephalopathy.  Admit  Observation with  Thoracic compression fracture and plan is  ortho consult, pain  control, CIWA scores, PT/OT and continue current meds.      Anticipated Length of Stay/Certification Statement:  Patient will be admitted on an observation basis with an anticipated length of stay of less than 2 midnights secondary to at least 1 midnight due to severe back pain.       ED Treatment-Medication Administration from 06/08/2025 0931 to 06/08/2025 1609         Date/Time Order Dose Route Action     06/08/2025 1041 dexamethasone (PF) (DECADRON) injection 10 mg 10 mg Intramuscular Given     06/08/2025 1041 diazepam (VALIUM) injection 2.5 mg 2.5 mg Intramuscular Given     06/08/2025 1410 HYDROmorphone (DILAUDID) injection 1 mg 1 mg  Subcutaneous Given            Scheduled Medications:  atorvastatin, 80 mg, Oral, Daily With Dinner  carvedilol, 6.25 mg, Oral, BID With Meals  enoxaparin, 40 mg, Subcutaneous, Q24H CARRIE  famotidine, 40 mg, Oral, HS  folic acid, 1 mg, Oral, Daily  insulin glargine, 15 Units, Subcutaneous, Daily With Breakfast  insulin lispro, 1-5 Units, Subcutaneous, TID AC  insulin lispro, 1-5 Units, Subcutaneous, HS  lactulose, 30 g, Oral, BID  multivitamin-minerals, 1 tablet, Oral, Daily  pancrelipase (Lip-Prot-Amyl), 36,000 Units, Oral, TID With Meals  pantoprazole, 40 mg, Oral, Early Morning  vitamin B-1, 250 mg, Oral, Daily      Continuous IV Infusions:     PRN Meds:  acetaminophen, 325 mg, Oral, Q4H PRN  albuterol, 2 puff, Inhalation, Q6H PRN  aluminum-magnesium hydroxide-simethicone, 30 mL, Oral, Q6H PRN  HYDROmorphone, 0.5 mg, Intravenous, Q3H PRN  magnesium hydroxide, 30 mL, Oral, Daily PRN  ondansetron, 4 mg, Intravenous, Q6H PRN  oxyCODONE, 5 mg, Oral, Q6H PRN      ED Triage Vitals   Temperature Pulse Respirations Blood Pressure SpO2 Pain Score   06/08/25 0939 06/08/25 0939 06/08/25 0939 06/08/25 0939 06/08/25 0939 06/08/25 1410   98.6 °F (37 °C) 96 20 (!) 189/113 98 % 9     Weight (last 2 days)       Date/Time Weight    06/08/25 0939 85 (187.39)            Vital Signs (last 3 days)       Date/Time Temp Pulse Resp BP MAP (mmHg) SpO2 O2 Device Patient Position - Orthostatic VS CIWA-Ar Total Pain    06/09/25 08:03:49 98 °F (36.7 °C) 76 -- 147/79 102 97 % None (Room air) Lying -- --    06/09/25 06:43:21 -- 79 -- 154/75 101 97 % -- -- -- --    06/09/25 0637 -- -- -- -- -- -- -- -- -- 7    06/09/25 03:46:40 98.2 °F (36.8 °C) 62 -- 150/85 107 95 % -- -- 2 --    06/09/25 0333 -- 57 -- -- -- -- -- -- -- --    06/09/25 02:14:37 -- 81 -- 144/94 111 97 % -- -- 2 --    06/09/25 0210 -- -- -- -- -- -- -- -- -- 8 06/09/25 0145 -- 69 -- -- -- 96 % -- -- -- --    06/09/25 0008 -- -- -- -- -- -- -- -- -- 7 06/09/25 00:03:45 -- 67 --  164/88 113 95 % -- -- 2 --    06/08/25 2129 -- -- -- -- -- -- -- -- -- 8    06/08/25 2017 -- -- -- -- -- -- -- -- -- No Pain    06/08/25 19:12:44 98.2 °F (36.8 °C) 84 -- 165/97 120 95 % -- -- -- --    06/08/25 19:08:44 98.2 °F (36.8 °C) 84 -- 165/97 120 95 % -- -- 3 --    06/08/25 1801 -- 88 -- 147/93 -- -- -- -- -- 9 06/08/25 1716 -- -- -- -- -- 97 % None (Room air) -- -- --    06/08/25 1629 -- -- -- -- -- 96 % None (Room air) -- -- 9 06/08/25 1621 -- -- -- -- -- -- -- -- -- 9 06/08/25 16:20:57 98.6 °F (37 °C) 86 18 162/73 103 96 % -- Lying 0 --    06/08/25 1538 -- -- -- -- -- 97 % None (Room air) -- -- --    06/08/25 1413 -- 83 16 145/75 -- 98 % None (Room air) Lying -- --    06/08/25 1410 -- -- -- -- -- -- -- -- -- 9 06/08/25 1130 -- 77 16 160/76 109 95 % None (Room air) Lying -- --    06/08/25 1045 -- 86 18 171/86 121 97 % None (Room air) Lying -- --    06/08/25 0939 98.6 °F (37 °C) 96 20 189/113 137 98 % None (Room air) Sitting -- --           CIWA-Ar Score       Row Name 06/09/25 03:46:40 06/09/25 0333 06/09/25 02:14:37       CIWA-Ar    Pulse -- 57 --    Nausea and Vomiting 0 -- 0    Tactile Disturbances 0 -- 0    Tremor 2 -- 2    Auditory Disturbances 0 -- 0    Paroxysmal Sweats 0 -- 0    Visual Disturbances 0 -- 0    Anxiety 0 -- 0    Headache, Fullness in Head 0 -- 0    Agitation 0 -- 0    Orientation and Clouding of Sensorium 0 -- 0    CIWA-Ar Total 2 -- 2      Row Name 06/09/25 00:03:45 06/08/25 19:08:44          CIWA-Ar    Nausea and Vomiting 0 0     Tactile Disturbances 0 0     Tremor 2 2     Auditory Disturbances 0 0     Paroxysmal Sweats 0 1     Visual Disturbances 0 0     Anxiety 0 0     Headache, Fullness in Head 0 0     Agitation 0 0     Orientation and Clouding of Sensorium 0 0     CIWA-Ar Total 2 3                     Pertinent Labs/Diagnostic Test Results:   Radiology:  CT spine thoracic and lumbar wo contrast   Final Interpretation by Caro Rm MD (06/08 1271)      Subacute  fractures along the superior endplates of T2-T5 with minimal interval loss of vertebral body height since 5/15/2025 (however less than 25% height loss overall). No retropulsion into the spinal canal or involvement of the posterior elements.      No new fractures. No traumatic subluxation.               Workstation performed: [id number]           Cardiology:  GI:        Results from last 7 days   Lab Units 06/09/25  0454   WBC Thousand/uL 5.53   HEMOGLOBIN g/dL 10.2*   HEMATOCRIT % 33.7*   PLATELETS Thousands/uL 79*   TOTAL NEUT ABS Thousands/µL 3.92         Results from last 7 days   Lab Units 06/09/25  0454 06/06/25  2310   SODIUM mmol/L 136 143   POTASSIUM mmol/L 3.5 3.1*   CHLORIDE mmol/L 102 107   CO2 mmol/L 27 27   ANION GAP mmol/L 7 9   BUN mg/dL 5 5*   CREATININE mg/dL 0.44* 0.48*   EGFR ml/min/1.73sq m 131 124   CALCIUM mg/dL 8.9 8*   XMAGNESIUM mg/dL  --  1.5     Results from last 7 days   Lab Units 06/09/25  0454 06/06/25  2310   AST U/L 42* 66*   ALT U/L 18 21   ALK PHOS U/L 145* 160*   TOTAL PROTEIN g/dL 6.4 6.8   ALBUMIN g/dL 3.4* 3.6   TOTAL BILIRUBIN mg/dL 2.21* 0.8     Results from last 7 days   Lab Units 06/08/25  2111 06/08/25  1615   POC GLUCOSE mg/dl 200* 240*     Results from last 7 days   Lab Units 06/09/25  0454 06/06/25  2310   GLUCOSE RANDOM mg/dL 160* 99                 Admitting Diagnosis: Back pain [M54.9]  Leg weakness, bilateral [R29.898]  Ambulatory dysfunction [R26.2]  Compression fracture of T4 vertebra, initial encounter ([facility]) [S22.040A]  Age/Sex: 50 y.o. male    Network Utilization Review Department  ATTENTION: Please call with any questions or concerns to [phone] and carefully listen to the prompts so that you are directed to the right person. All voicemails are confidential.   For Discharge needs, contact Care Management DC Support Team at [phone] opt. 2  Send all requests for admission clinical reviews, approved or denied determinations and any other requests to  dedicated fax number below belonging to the campus where the patient is receiving treatment. List of dedicated fax numbers for the Facilities:  FACILITY NAME UR FAX NUMBER   ADMISSION DENIALS (Administrative/Medical Necessity) [phone]   DISCHARGE SUPPORT TEAM (NETWORK) [phone]   PARENT CHILD HEALTH (Maternity/NICU/Pediatrics) [phone]   [facility] [phone]   [facility] [phone]   [facility] [phone]   [facility] [phone]   [facility] [phone]   [facility] [phone]   [facility] [phone]   [facility] [phone]   [facility] [phone]   Critical access hospital [phone]   [facility] [phone]   [facility] [phone]

## 2025-06-09 NOTE — OCCUPATIONAL THERAPY NOTE
"          Occupational Therapy Evaluation        Patient Name: Ashwin Wright  Today's Date: 6/9/2025 06/09/25 1315   OT Last Visit   OT Visit Date 06/09/25   Note Type   Note type Evaluation   Pain Assessment   Pain Assessment Tool 0-10   Pain Score 8   Pain Location/Orientation Orientation: Upper;Location: Back;Orientation: Mid;Orientation: Lower   Pain Onset/Description Onset: Ongoing   Multiple Pain Sites No   Restrictions/Precautions   Weight Bearing Precautions Per Order No   Braces or Orthoses Other (Comment);TLSO   Other Precautions Fall Risk;Pain   Home Living   Type of Home House   Home Layout Two level;Bed/bath upstairs;Performs ADLs on one level;Stairs to enter with rails  (13  (full flight ) CRISTAL)   Bathroom Shower/Tub Tub/shower unit   Bathroom Toilet Standard   Bathroom Equipment Other (Comment)  (none at baseline)   Bathroom Accessibility Accessible   Home Equipment Walker;Cane   Additional Comments ambulatory without   Prior Function   Level of [place] Independent with ADLs;Independent with functional mobility   Lives With Spouse   Receives Help From Family   IADLs Independent with driving;Family/Friend/Other provides meals;Independent with medication management   Falls in the last 6 months 0  (bent over to  the cat , \"heard a POP\")   Vocational Retired   Lifestyle   Autonomy Patient lives with family in a 2 story house, full flight to enter. Patient was independent with ADLs, except for Lower body dressing, and indpendnet with IADLs. Patient was ambulating without an AD.   Reciprocal Relationships Supportive Family   General   Family/Caregiver Present No   ADL   Where Assessed Edge of bed   Eating Assistance 7  Independent   Grooming Assistance 5  Supervision/Setup   UB Bathing Assistance 4  Minimal Assistance   LB Bathing Assistance 2  Maximal Assistance   UB Dressing Assistance 4  Minimal Assistance   LB Dressing Assistance 2  Maximal Assistance   Toileting Assistance  3  " Moderate Assistance   Functional Assistance 4  Minimal Assistance   Bed Mobility   Supine to Sit 5  Supervision   Additional items Assist x 1;HOB elevated;Increased time required   Sit to Supine 5  Supervision   Additional items Assist x 1;Increased time required   Transfers   Sit to Stand 5  Supervision   Additional items Assist x 1;Increased time required   Stand to Sit 5  Supervision   Additional items Assist x 1;Increased time required   Functional Mobility   Functional Mobility   (contact guar/ supervision)   Additional Comments assist of 1/ no LOB/ increased pain and discomfort   Balance   Static Sitting Fair +   Dynamic Sitting Fair +   Static Standing Fair   Dynamic Standing Fair -   Activity Tolerance   Activity Tolerance Patient tolerated treatment well;Patient limited by pain   RUE Assessment   RUE Assessment WFL   LUE Assessment   LUE Assessment WFL   Hand Function   Gross Motor Coordination Functional   Fine Motor Coordination Functional   Vision-Basic Assessment   Current Vision Does not wear glasses   Psychosocial   Psychosocial (WDL) WDL   Perception   Inattention/Neglect Appears intact   Cognition   Overall Cognitive Status WFL   Arousal/Participation Alert;Responsive;Cooperative   Attention Within functional limits   Orientation Level Oriented X4   Memory Within functional limits   Following Commands Follows all commands and directions without difficulty   Assessment   Limitation Decreased ADL status;Decreased endurance;Decreased self-care trans;Decreased high-level ADLs   Prognosis Good   Assessment Patient is a 50 y.o. male seen for OT evaluation s/p admit to [facility] on 6/8/2025 w/Thoracic compression fracture (HCC). Commorbidities affecting patient's functional performance at time of assessment include: Thoracic compression fracture, Chronic pancreatitis, GERD, severe protein- calorie malnutrition, Controlled type 2 DM, Cirrhosis of liver, alcohol use disorder.  Orders placed for  OT evaluation and treatment.  Performed at least two patient identifiers during session including name and wristband.  Prior to admission,  Patient lives with family in a 2 story house, full flight to enter. Patient was independent with ADLs, except for Lower body dressing, and indpendnet with IADLs. Patient was ambulating without an AD.  Personal factors affecting patient at time of initial evaluation include: difficulty performing ADLs and difficulty performing IADLs. Upon evaluation, patient requires minimal  assist for UB ADLs, maximal assist for LB ADLs, transfers and functional ambulation in room and bathroom with supervision and contact guard assist, Occupational performance is affected by the following deficits: degenerative arthritic joint changes, dynamic sit/ stand balance deficit with poor standing tolerance time for self care and functional mobility, decreased activity tolerance, increased pain, and orthopedic restrictions.  Patient to benefit from continued Occupational Therapy treatment while in the hospital to address deficits as defined above and maximize level of functional independence with ADLs and functional mobility. Occupational Performance areas to address include: bathing/ shower, dressing, transfer to all surfaces, functional mobility, emergency response, health maintenance, IADLs: safety procedures, and Leisure Participation. From OT standpoint, recommendation at time of d/c would be Level 3.   Plan   Treatment Interventions ADL retraining;Functional transfer training;UE strengthening/ROM;Endurance training;Patient/family training;Equipment evaluation/education;Compensatory technique education;Continued evaluation   Goal Expiration Date 06/23/25   OT Frequency 3-5x/wk   Discharge Recommendation   Rehab Resource Intensity Level, OT III (Minimum Resource Intensity)   AM-PAC Daily Activity Inpatient   Lower Body Dressing 2   Bathing 2   Toileting 2   Upper Body Dressing 3   Grooming 4    Eating 4   Daily Activity Raw Score 17   Daily Activity Standardized Score (Calc for Raw Score >=11) 37.26   AM-PAC Applied Cognition Inpatient   Following a Speech/Presentation 4   Understanding Ordinary Conversation 4   Taking Medications 4   Remembering Where Things Are Placed or Put Away 4   Remembering List of 4-5 Errands 4   Taking Care of Complicated Tasks 4   Applied Cognition Raw Score 24   Applied Cognition Standardized Score 62.21       Occupational therapy Goals: In 7-10 days    Patient will verbalize/ demonstrate back safety precautions during functional activity  with No verbal cues    Patient will demonstrate correct of  use of long handle equipment to complete LB ADLs @ Mod I  level.     Patient will increase standing tolerance time to 5 minutes with  Unilateral UE support to complete sink level ADLs @  Mod I  level    Patient will restore  independence in bed mobility using Log Rolling technique to transfer OOB at Mod I  level.     Patient will verbalize 3  safety awareness/ body mechanics principles to  prevent falls in the home setting.     Patient will complete UB/LB ADLs @ Mod I level.    Patient will complete light meal prep @ Mod I level.    Patient will complete transfers to all surfaces @ Mod I level with AD as indicated.

## 2025-06-09 NOTE — PLAN OF CARE
Problem: OCCUPATIONAL THERAPY ADULT  Goal: Performs self-care activities at highest level of function for planned discharge setting.  See evaluation for individualized goals.  Description: Treatment Interventions: ADL retraining, Functional transfer training, UE strengthening/ROM, Endurance training, Patient/family training, Equipment evaluation/education, Compensatory technique education, Continued evaluation          See flowsheet documentation for full assessment, interventions and recommendations.   Note: Limitation: Decreased ADL status, Decreased endurance, Decreased self-care trans, Decreased high-level ADLs  Prognosis: Good  Assessment: Patient is a 50 y.o. male seen for OT evaluation s/p admit to [facility] on 6/8/2025 w/Thoracic compression fracture (HCC). Commorbidities affecting patient's functional performance at time of assessment include: Thoracic compression fracture, Chronic pancreatitis, GERD, severe protein- calorie malnutrition, Controlled type 2 DM, Cirrhosis of liver, alcohol use disorder.  Orders placed for OT evaluation and treatment.  Performed at least two patient identifiers during session including name and wristband.  Prior to admission,  Patient lives with family in a 2 story house, full flight to enter. Patient was independent with ADLs, except for Lower body dressing, and indpendnet with IADLs. Patient was ambulating without an AD.  Personal factors affecting patient at time of initial evaluation include: difficulty performing ADLs and difficulty performing IADLs. Upon evaluation, patient requires minimal  assist for UB ADLs, maximal assist for LB ADLs, transfers and functional ambulation in room and bathroom with supervision and contact guard assist, Occupational performance is affected by the following deficits: degenerative arthritic joint changes, dynamic sit/ stand balance deficit with poor standing tolerance time for self care and functional mobility, decreased  activity tolerance, increased pain, and orthopedic restrictions.  Patient to benefit from continued Occupational Therapy treatment while in the hospital to address deficits as defined above and maximize level of functional independence with ADLs and functional mobility. Occupational Performance areas to address include: bathing/ shower, dressing, transfer to all surfaces, functional mobility, emergency response, health maintenance, IADLs: safety procedures, and Leisure Participation. From OT standpoint, recommendation at time of d/c would be Level 3.     Rehab Resource Intensity Level, OT: III (Minimum Resource Intensity)

## 2025-06-09 NOTE — PHYSICAL THERAPY NOTE
"   Physical Therapy Evaluation     Patient's Name: Ashwin Wright    Admitting Diagnosis  Back pain [M54.9]  Leg weakness, bilateral [R29.898]  Ambulatory dysfunction [R26.2]  Compression fracture of T4 vertebra, initial encounter ([facility]) [S22.664A]    Problem List  Problem List[1]  Past Medical History  Past Medical History[2]  Past Surgical History  Past Surgical History[3]     06/09/25 1327   PT Last Visit   PT Visit Date 06/09/25   Note Type   Note type Evaluation   Pain Assessment   Pain Assessment Tool 0-10   Pain Score 8   Pain Location/Orientation Orientation: Upper;Orientation: Mid;Orientation: Lower;Location: Back   Pain Onset/Description Onset: Ongoing   Hospital Pain Intervention(s) Repositioned;Ambulation/increased activity  (RN made aware)   Multiple Pain Sites No   Restrictions/Precautions   Weight Bearing Precautions Per Order No   Braces or Orthoses TLSO  (per neurosurgery: \"Continue TLSO brace when OOB and HOB greater than 45 degrees\")   Other Precautions Fall Risk;Pain;Spinal precautions   Home Living   Type of Home House   Home Layout Two level;Bed/bath upstairs;Performs ADLs on one level;Stairs to enter with rails  (13 CRISTAL to enter with rails)   Bathroom Shower/Tub Tub/shower unit   Bathroom Toilet Standard   Bathroom Equipment Other (Comment)  (none per pt)   Bathroom Accessibility Accessible   Home Equipment Walker;Cane;Wheelchair-manual   Additional Comments Ambulatory without AD at baseline   Prior Function   Level of [place] Independent with ADLs;Independent with functional mobility;Independent with IADLS   Lives With Spouse   Receives Help From Family   IADLs Independent with driving;Independent with medication management;Family/Friend/Other provides meals   Falls in the last 6 months 0   Vocational Retired   General   Family/Caregiver Present No   Cognition   Overall Cognitive Status WFL   Arousal/Participation Alert   Orientation Level Oriented X4   Memory Within functional limits " "  Following Commands Follows all commands and directions without difficulty   Comments Pt agreeable to PT.   Subjective   Subjective \"I'm in pain.\"   RLE Assessment   RLE Assessment X   Strength RLE   RLE Overall Strength 3+/5  (at least; grossly assessed; limited secondary to pain)   LLE Assessment   LLE Assessment X   Strength LLE   LLE Overall Strength 3+/5  (at least; grossly assessed; limited secondary to pain)   Light Touch   RLE Light Touch Grossly intact  (pt reports tingling in legs when ambulating increased distance)   LLE Light Touch Grossly intact  (pt reports tingling in legs when ambulating increased distance)   Bed Mobility   Rolling R 5  Supervision   Additional items Assist x 1;HOB elevated;Bedrails;Increased time required   Supine to Sit 5  Supervision   Additional items Assist x 1;HOB elevated;Bedrails;Increased time required;Verbal cues   Sit to Supine 5  Supervision   Additional items Assist x 1;HOB elevated;Bedrails;Increased time required;Verbal cues   Transfers   Sit to Stand 5  Supervision   Additional items Assist x 1;Increased time required;Verbal cues   Stand to Sit 5  Supervision   Additional items Assist x 1;Increased time required;Verbal cues   Ambulation/Elevation   Gait pattern Improper Weight shift;Wide DAVIAN;Short stride;Decreased heel strike;Decreased toe off   Gait Assistance   (Contact Guard)   Additional items Assist x 1;Verbal cues   Assistive Device None   Distance 50' x 2 trials   Ambulation/Elevation Additional Comments Pt performed alternating marching in place with unilateral UE support with minimal assist/CG assist.   Balance   Static Sitting Good   Dynamic Sitting Fair +   Static Standing Fair   Dynamic Standing Fair -   Ambulatory Fair -   Endurance Deficit   Endurance Deficit Yes   Endurance Deficit Description Pt limited due to pain   Activity Tolerance   Activity Tolerance Patient tolerated treatment well;Patient limited by pain   Medical Staff Made Aware OT Reena PT " Elena; discussed case with Dr. Boston  (Co-evaluation performed with OT secondary to complex medical condition of patient and regression of functional status from baseline. PT/OT goals were addressed separately.)   Nurse Made Aware RN Catrina   Assessment   Prognosis Good   Problem List Decreased strength;Impaired balance;Decreased mobility;Pain;Decreased endurance   Assessment Pt is a 50 y.o. male evaluated by PT secondary to admission to [facility] due to a thoracic compression fracture. Co-morbidities affecting pt during admission include chronic pancreatitis, GERD, severe protein-calorie malnutrition, type 2 diabetes mellitus, cirrhosis of liver without ascites, and alcohol use disorder. Pt resides in two-story home with 13 steps to enter. Pt was previously independent in ADLs and IADLs and ambulated without the use of an AD. Strength was grossly assessed due to pt reporting pain. Pt performed bed mobility and transfers with supervision A x 1 requiring increased time due to pain. Pt ambulated 50' x 2 trials with CG A x 1 due to improper weight shift and pain. AM-PAC assessment was administered with pt scoring 17/24. Pt presents with the following impairments: decreased strength, decreased endurance, impaired balance, and decreased mobility. These impairments limit pt's ability to safely perform transfers and ambulation independently, navigate community distances, navigate uneven surfaces, and negotiate stairs. PT to recommend level 3 minimum resource intensity to address above listed impairments. Pt to benefit from PT.   Barriers to Discharge Inaccessible home environment;Other (Comment)  (decline in functional mobility)   Goals   STG Expiration Date 06/19/25   Short Term Goal #1 In 10 days: Pt will improve bilateral LE strength by 1/2 MMT grade to be more independent in activities involving functional mobility. Pt will be able to perform all transfers and bed mobility independently demonstrating safety in  functional mobility. Pt will be able to ambulate 250' independently in order to be able to navigate community distances. Pt will be able to navigate 13 steps independently in order to enter home.   Plan   Treatment/Interventions Functional transfer training;LE strengthening/ROM;Elevations;Therapeutic exercise;Endurance training;Patient/family training;Equipment eval/education;Bed mobility;Gait training;Spoke to nursing;OT;Spoke to MD   PT Frequency 2-3x/wk   Discharge Recommendation   Rehab Resource Intensity Level, PT III (Minimum Resource Intensity)   AM-PAC Basic Mobility Inpatient   Turning in Flat Bed Without Bedrails 3   Lying on Back to Sitting on Edge of Flat Bed Without Bedrails 3   Moving Bed to Chair 3   Standing Up From Chair Using Arms 3   Walk in Room 3   Climb 3-5 Stairs With Railing 2   Basic Mobility Inpatient Raw Score 17   Basic Mobility Standardized Score 39.67   [facility] Highest Level Of Mobility   -HLM Goal 5: Stand one or more mins   JH-HLM Achieved 7: Walk 25 feet or more         PT Evaluation Time  [id number]  Pina Edmond, SPT         [1]   Patient Active Problem List  Diagnosis    Pancreatic lesion    Renal mass    Encounter for smoking cessation counseling    Leukocytosis    RBBB    Epigastric pain    Generalized abdominal pain    Abnormal transaminases    Acute on chronic pancreatitis (HCC)    Leukopenia    Hypertriglyceridemia    Esophagitis    Chronic pancreatitis (HCC)    Chronic gastritis without bleeding    GERD (gastroesophageal reflux disease)    Hyponatremia    Elevated blood pressure reading    Upper abdominal pain    Hypertension    Pancreatitis, unspecified pancreatitis type    Abdominal pain, epigastric    Change in bowel habits    Musculoskeletal neck pain    Prediabetes    Liver abscess    Cervical spondylosis    Tremor of both outstretched hands    Tremor, essential    Right groin pain    Non-recurrent bilateral inguinal hernia without obstruction or gangrene     Constipation    Severe protein-calorie malnutrition (HCC)    Incomplete tear of left rotator cuff, unspecified whether traumatic    Controlled type 2 diabetes mellitus with hyperglycemia, with long-term current use of insulin (HCC)    Memory difficulties    Cirrhosis of liver without ascites, unspecified hepatic cirrhosis type (HCC)    Hepatic encephalopathy (HCC)    Secondary esophageal varices without bleeding (HCC)    Alcohol withdrawal syndrome, with unspecified complication (HCC)    Alcohol use disorder, severe, dependence (HCC)    Thoracic compression fracture (HCC)   [2]   Past Medical History:  Diagnosis Date    Alcoholism (HCC)     Arthritis     Asthma     Chronic pain disorder     Chronic pancreatitis (HCC)     Cirrhosis (HCC)     early cirrhosis    GERD (gastroesophageal reflux disease)     History of COVID-19 01/2022    mild s/s    Hyperlipidemia     Hypertension     Liver abscess     Liver disease     Meniscus tear     left knee work injury last assessed 08/24/2016    Pancreatitis     Pneumonia     Rotator cuff tear     Stomach disorder     Chronic gastritus   [3]   Past Surgical History:  Procedure Laterality Date    CELIAC PLEXUS BLOCK Bilateral 10/29/2018    Procedure: SPLANCHNIC NERVE BLOCK;  Surgeon: Ramiro Chinchilla MD;  [facility];  Service: Pain Management     CELIAC PLEXUS BLOCK Bilateral 01/10/2019    Procedure: SPLANCHNIC NERVE BLOCK;  Surgeon: Ramiro Chinchilla MD;  [facility];  Service: Pain Management     CHOLECYSTECTOMY      COLONOSCOPY      ESOPHAGOGASTRODUODENOSCOPY N/A 11/16/2017    Procedure: ESOPHAGOGASTRODUODENOSCOPY (EGD);  Surgeon: Ever Bonner MD;  [facility];  Service: Gastroenterology    ESOPHAGOGASTRODUODENOSCOPY N/A 04/19/2018    Procedure: ESOPHAGOGASTRODUODENOSCOPY (EGD);  Surgeon: Orestes Forrest MD;  [facility];  Service: Gastroenterology    ESOPHAGOGASTRODUODENOSCOPY N/A 05/10/2018    Procedure: ESOPHAGOGASTRODUODENOSCOPY (EGD);  Surgeon:  Vaibhav Matthew MD;  [facility];  Service: Gastroenterology    ESOPHAGUS SURGERY  01/17/2025    nubia galindo tear repain    HERNIA REPAIR Bilateral 02/03/2023    Procedure: REPAIR HERNIA INGUINAL, LAPAROSCOPIC,;  Surgeon: Juanjo Travis DO;  [facility];  Service: General    IR TEMPORARY DIALYSIS CATHETER PLACEMENT  02/28/2019    KNEE ARTHROSCOPY Bilateral     KNEE ARTHROSCOPY Right 2009    cibishino last assessed 08/24/2016    KNEE ARTHROSCOPY Right 07/01/2019    LIVER SURGERY      NERVE BLOCK Bilateral 05/29/2018    Procedure: SPLANCHNIC NERVE BLOCK;  Surgeon: Ramiro Chinchilla MD;  [facility];  Service: Pain Management     PANCREAS SURGERY      stents    PANCREATIC CYST EXCISION      DE INJECTION ANES LMBR/THRC PARAVERTBRL SYMPATHETIC Bilateral 12/28/2017    Procedure: SPLANCHNIC NERVE BLOCK;  Surgeon: Ramiro Chinchilla MD;  [facility];  Service: Pain Management     DE INJX ANES CELIAC PLEXUS W/WO RADIOLOGIC MONITRNG Bilateral 05/09/2017    Procedure: CELIAC PLEXUS BLOCK ;  Surgeon: Ramiro Chinchilla MD;  [facility];  Service: Pain Management     DE INJX ANES CELIAC PLEXUS W/WO RADIOLOGIC MONITRNG Bilateral 06/01/2017    Procedure: SPLANCHNIC NERVE BLOCK at T12;  Surgeon: Ramiro Chinchilla MD;  [facility];  Service: Pain Management     DE INJX ANES CELIAC PLEXUS W/WO RADIOLOGIC MONITRNG Bilateral 08/08/2017    Procedure: BILATERAL SPLANCHNIC NERVE BLOCK T12;  Surgeon: Ramiro Chinchilla MD;  [facility];  Service: Pain Management     DE INJX ANES CELIAC PLEXUS W/WO RADIOLOGIC MONITRNG Bilateral 04/18/2019    Procedure: BLOCK / INJECTION CELIAC PLEXUS;  Surgeon: Ramiro Chinchilla MD;  [facility];  Service: Pain Management     DE INJX ANES CELIAC PLEXUS W/WO RADIOLOGIC MONITRNG Bilateral 08/20/2019    Procedure: SPLANCHNIC NERVE BLOCK;  Surgeon: Ramiro Chinchilla MD;  [facility];  Service: Pain Management     DE INJX ANES CELIAC PLEXUS W/WO  RADIOLOGIC MONITRNG Bilateral 10/17/2019    Procedure: SPLANCHNIC NERVE BLOCK;  Surgeon: Ramiro Chinchilla MD;  [facility];  Service: Pain Management     OR LAPAROSCOPY SURG CHOLECYSTECTOMY N/A 02/14/2017    Procedure: LAPAROSCOPIC CHOLECYSTECTOMY, IOC, POSSIBLE OPEN.;  Surgeon: Suresh Lang MD;  [facility];  Service: General    OR SURGICAL ARTHROSCOPY SHOULDER W/ROTATOR CUFF RPR Left 06/09/2023    Procedure: Left Shoulder Arthroscopic Rotator Cuff Repair, Open Subpectoral Biceps Tenodesis, Acromioplasty, Extensive Debridement;  Surgeon: Toro Healy DO;  [facility];  Service: Orthopedics    ROTATOR CUFF REPAIR Right     SHOULDER ARTHROSCOPY      SHOULDER ARTHROSCOPY Right     SHOULDER SURGERY      TENDON REPAIR  2015    Right shoulder

## 2025-06-09 NOTE — PROGRESS NOTES
Progress Note - Hospitalist   Name: Ashwin Wright 50 y.o. male I MRN: [id number]  Unit/Bed#: -01 I Date of Admission: 6/8/2025   Date of Service: 6/9/2025 I Hospital Day: 0    Assessment & Plan  Thoracic compression fracture (HCC)  Patient has severe back pain 10/10 in intensity on admission.  T1-T4 compression fractures presenting with worsening back pain radiating down bilateral legs. Very low concern for cauda equina - post void residual was 10mL, he had pin prick sensation. Bilateral LE strength is 3/5 and he is having pain with ambulating. ER discussed with neuro surg on-call in [place] who does not think his new symptoms are not related to his compression fractures.  CT Subacute fractures along the superior endplates of T2-T5 with minimal interval loss of vertebral body height since 5/15/2025 (however less than 25% height loss overall). No retropulsion into the spinal canal or involvement of the posterior elements.  No new fractures. No traumatic subluxation.  Today's update and plan;  Reach out to neurosurgery recommended pain optimization with TLSO.  PT OT recommended level 3  Chronic pancreatitis (HCC)  Continue Creon 36,000 units 3 times daily with meals.  No evidence of acute pancreatitis clinically  GERD (gastroesophageal reflux disease)  Patient takes PPI in the morning, Pepcid in the evening.  Continue home regimen  Severe protein-calorie malnutrition (HCC)  Malnutrition Findings:                        High-protein diet advised         BMI Findings:           Body mass index is 26.14 kg/m².     Controlled type 2 diabetes mellitus with hyperglycemia, with long-term current use of insulin (HCC)  Lab Results   Component Value Date    HGBA1C 5.6 05/12/2025       Recent Labs     06/08/25  2111 06/09/25  0805 06/09/25  0947 06/09/25  1129   POCGLU 200* 149* 196* 139       Blood Sugar Average: Last 72 hrs:  (P) 184.8  Hold Jardiance.  Lantus 15 units subcu daily in the morning.  Patient does not  take any mealtime insulins.  Will do blood sugar monitoring and sliding scale insulin coverage  Cirrhosis of liver without ascites, unspecified hepatic cirrhosis type (HCC)  Patient to continue lactulose as he has high risk of hepatic encephalopathy  Alcohol use disorder, severe, dependence (HCC)  Continue thiamine, folic acid and multivitamin  On [facility] protocol    VTE Pharmacologic Prophylaxis: VTE Score: 2 Moderate Risk (Score 3-4) - Pharmacological DVT Prophylaxis Ordered: enoxaparin (Lovenox).    Mobility:   Basic Mobility Inpatient Raw Score: 17  JH-HLM Goal: 5: Stand one or more mins  JH-HLM Achieved: 7: Walk 25 feet or more  JH-HLM Goal achieved. Continue to encourage appropriate mobility.    Patient Centered Rounds: I performed bedside rounds with nursing staff today.   Discussions with Specialists or Other Care Team Provider: Neurosurgery    Education and Discussions with Family / Patient: Updated patient.     Current Length of Stay: 0 day(s)  Current Patient Status: Observation   Certification Statement: The patient will continue to require additional inpatient hospital stay due to assessment and plan  Discharge Plan: Anticipate discharge in 24-48 hrs to home.    Code Status: Level 1 - Full Code    Subjective   Patient seen and examined at bedside.  He reported he has no leg weakness no urinary and fecal incontinence or retention sensations are intact  He has difficulty leaning forward and standing up due to pain    Objective :  Temp:  [98 °F (36.7 °C)-98.6 °F (37 °C)] 98.1 °F (36.7 °C)  HR:  [57-88] 60  BP: (144-165)/(73-97) 155/78  Resp:  [18] 18  SpO2:  [95 %-98 %] 98 %  O2 Device: None (Room air)    Body mass index is 26.14 kg/m².     Input and Output Summary (last 24 hours):     Intake/Output Summary (Last 24 hours) at 6/9/2025 1541  Last data filed at 6/9/2025 0900  Gross per 24 hour   Intake 540 ml   Output 625 ml   Net -85 ml       Physical Exam  Alert oriented to time place and person  S1 plus  S2  No tachypnea  Abdomen soft, nontender  No pedal edema    Lines/Drains:              Lab Results: I have reviewed the following results:   Results from last 7 days   Lab Units 06/09/25  0454   WBC Thousand/uL 5.53   HEMOGLOBIN g/dL 10.2*   HEMATOCRIT % 33.7*   PLATELETS Thousands/uL 79*   SEGS PCT % 72   LYMPHO PCT % 18   MONO PCT % 10   EOS PCT % 0     Results from last 7 days   Lab Units 06/09/25  0454   SODIUM mmol/L 136   POTASSIUM mmol/L 3.5   CHLORIDE mmol/L 102   CO2 mmol/L 27   BUN mg/dL 5   CREATININE mg/dL 0.44*   ANION GAP mmol/L 7   CALCIUM mg/dL 8.9   ALBUMIN g/dL 3.4*   TOTAL BILIRUBIN mg/dL 2.21*   ALK PHOS U/L 145*   ALT U/L 18   AST U/L 42*   GLUCOSE RANDOM mg/dL 160*         Results from last 7 days   Lab Units 06/09/25  1129 06/09/25  0947 06/09/25  0805 06/08/25  2111 06/08/25  1615   POC GLUCOSE mg/dl 139 196* 149* 200* 240*               Recent Cultures (last 7 days):         Imaging Results Review: I reviewed radiology reports from this admission including: chest xray.  Other Study Results Review: Other studies reviewed include: CBC and BMP    Last 24 Hours Medication List:     Current Facility-Administered Medications:     acetaminophen (TYLENOL) tablet 325 mg, Q4H PRN    albuterol (PROVENTIL HFA,VENTOLIN HFA) inhaler 2 puff, Q6H PRN    aluminum-magnesium hydroxide-simethicone (MAALOX) oral suspension 30 mL, Q6H PRN    atorvastatin (LIPITOR) tablet 80 mg, Daily With Dinner    carvedilol (COREG) tablet 6.25 mg, BID With Meals    enoxaparin (LOVENOX) subcutaneous injection 40 mg, Q24H CARRIE    famotidine (PEPCID) tablet 40 mg, HS    folic acid (FOLVITE) tablet 1 mg, Daily    HYDROmorphone (DILAUDID) injection 0.5 mg, Q3H PRN    insulin glargine (LANTUS) subcutaneous injection 15 Units 0.15 mL, Daily With Breakfast    insulin lispro (HumALOG/ADMELOG) 100 units/mL subcutaneous injection 1-5 Units, TID AC **AND** Fingerstick Glucose (POCT), TID AC    insulin lispro (HumALOG/ADMELOG) 100 units/mL  subcutaneous injection 1-5 Units, HS    lactulose (CHRONULAC) oral solution 30 g, BID    lidocaine (LIDODERM) 5 % patch 1 patch, Daily    magnesium hydroxide (MILK OF MAGNESIA) oral suspension 30 mL, Daily PRN    multivitamin-minerals (CENTRUM) tablet 1 tablet, Daily    ondansetron (ZOFRAN) injection 4 mg, Q6H PRN    oxyCODONE (ROXICODONE) IR tablet 5 mg, Q6H PRN    pancrelipase (Lip-Prot-Amyl) (CREON) delayed release capsule 36,000 Units, TID With Meals    pantoprazole (PROTONIX) EC tablet 40 mg, Early Morning    thiamine tablet 250 mg, Daily    Administrative Statements   Today, Patient Was Seen By: Ray Boston MD      **Please Note: This note may have been constructed using a voice recognition system.**

## 2025-06-09 NOTE — PLAN OF CARE
Problem: PAIN - ADULT  Goal: Verbalizes/displays adequate comfort level or baseline comfort level  Description: Interventions:  - Encourage patient to monitor pain and request assistance  - Assess pain using appropriate pain scale  - Administer analgesics as ordered based on type and severity of pain and evaluate response  - Implement non-pharmacological measures as appropriate and evaluate response  - Consider cultural and social influences on pain and pain management  - Notify physician/advanced practitioner if interventions unsuccessful or patient reports new pain  - Educate patient/family on pain management process including their role and importance of  reporting pain   - Provide non-pharmacologic/complimentary pain relief interventions  Outcome: Progressing     Problem: INFECTION - ADULT  Goal: Absence or prevention of progression during hospitalization  Description: INTERVENTIONS:  - Assess and monitor for signs and symptoms of infection  - Monitor lab/diagnostic results  - Monitor all insertion sites, i.e. indwelling lines, tubes, and drains  - Monitor endotracheal if appropriate and nasal secretions for changes in amount and color  - [place] appropriate cooling/warming therapies per order  - Administer medications as ordered  - Instruct and encourage patient and family to use good hand hygiene technique  - Identify and instruct in appropriate isolation precautions for identified infection/condition  Outcome: Progressing

## 2025-06-09 NOTE — PLAN OF CARE
Problem: INFECTION - ADULT  Goal: Absence or prevention of progression during hospitalization  Description: INTERVENTIONS:  - Assess and monitor for signs and symptoms of infection  - Monitor lab/diagnostic results  - Monitor all insertion sites, i.e. indwelling lines, tubes, and drains  - Monitor endotracheal if appropriate and nasal secretions for changes in amount and color  - [place] appropriate cooling/warming therapies per order  - Administer medications as ordered  - Instruct and encourage patient and family to use good hand hygiene technique  - Identify and instruct in appropriate isolation precautions for identified infection/condition  6/9/2025 0526 by Agata Kay RN  Outcome: Progressing  6/8/2025 2019 by Agata Kay RN  Outcome: Progressing
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
## 2025-06-09 NOTE — ASSESSMENT & PLAN NOTE
Patient has severe back pain 10/10 in intensity on admission.  T1-T4 compression fractures presenting with worsening back pain radiating down bilateral legs. Very low concern for cauda equina - post void residual was 10mL, he had pin prick sensation. Bilateral LE strength is 3/5 and he is having pain with ambulating. ER discussed with neuro surg on-call in [place] who does not think his new symptoms are not related to his compression fractures.  CT Subacute fractures along the superior endplates of T2-T5 with minimal interval loss of vertebral body height since 5/15/2025 (however less than 25% height loss overall). No retropulsion into the spinal canal or involvement of the posterior elements.  No new fractures. No traumatic subluxation.  Today's update and plan;  Reach out to neurosurgery recommended pain optimization with TLSO.  PT OT recommended level 3

## 2025-06-09 NOTE — PLAN OF CARE
Problem: PHYSICAL THERAPY ADULT  Goal: Performs mobility at highest level of function for planned discharge setting.  See evaluation for individualized goals.  Description: Treatment/Interventions: Functional transfer training, LE strengthening/ROM, Elevations, Therapeutic exercise, Endurance training, Patient/family training, Equipment eval/education, Bed mobility, Gait training, Spoke to nursing, OT, Spoke to MD          See flowsheet documentation for full assessment, interventions and recommendations.  Note: Prognosis: Good  Problem List: Decreased strength, Impaired balance, Decreased mobility, Pain, Decreased endurance  Assessment: Pt is a 50 y.o. male evaluated by PT secondary to admission to [facility] due to a thoracic compression fracture. Co-morbidities affecting pt during admission include chronic pancreatitis, GERD, severe protein-calorie malnutrition, type 2 diabetes mellitus, cirrhosis of liver without ascites, and alcohol use disorder. Pt resides in two-story home with 13 steps to enter. Pt was previously independent in ADLs and IADLs and ambulated without the use of an AD. Strength was grossly assessed due to pt reporting pain. Pt performed bed mobility and transfers with supervision A x 1 requiring increased time due to pain. Pt ambulated 50' x 2 trials with CG A x 1 due to improper weight shift and pain. AM-PAC assessment was administered with pt scoring 17/24. Pt presents with the following impairments: decreased strength, decreased endurance, impaired balance, and decreased mobility. These impairments limit pt's ability to safely perform transfers and ambulation independently, navigate community distances, navigate uneven surfaces, and negotiate stairs. PT to recommend level 3 minimum resource intensity to address above listed impairments. Pt to benefit from PT.  Barriers to Discharge: Inaccessible home environment, Other (Comment) (decline in functional mobility)     Rehab Resource  Intensity Level, PT: III (Minimum Resource Intensity)    See flowsheet documentation for full assessment.

## 2025-06-10 ENCOUNTER — TRANSITIONAL CARE MANAGEMENT (OUTPATIENT)
Dept: FAMILY MEDICINE CLINIC | Facility: CLINIC | Age: 50
End: 2025-06-10

## 2025-06-10 VITALS
HEART RATE: 68 BPM | RESPIRATION RATE: 20 BRPM | OXYGEN SATURATION: 96 % | TEMPERATURE: 97.7 F | DIASTOLIC BLOOD PRESSURE: 84 MMHG | HEIGHT: 71 IN | BODY MASS INDEX: 26.23 KG/M2 | SYSTOLIC BLOOD PRESSURE: 146 MMHG | WEIGHT: 187.39 LBS

## 2025-06-10 LAB
ANION GAP SERPL CALCULATED.3IONS-SCNC: 8 MMOL/L (ref 4–13)
BUN SERPL-MCNC: 6 MG/DL (ref 5–25)
CALCIUM SERPL-MCNC: 8.4 MG/DL (ref 8.4–10.2)
CHLORIDE SERPL-SCNC: 104 MMOL/L (ref 96–108)
CO2 SERPL-SCNC: 25 MMOL/L (ref 21–32)
CREAT SERPL-MCNC: 0.46 MG/DL (ref 0.6–1.3)
ERYTHROCYTE [DISTWIDTH] IN BLOOD BY AUTOMATED COUNT: 23.5 % (ref 11.6–15.1)
GFR SERPL CREATININE-BSD FRML MDRD: 128 ML/MIN/1.73SQ M
GLUCOSE P FAST SERPL-MCNC: 116 MG/DL (ref 65–99)
GLUCOSE SERPL-MCNC: 116 MG/DL (ref 65–140)
GLUCOSE SERPL-MCNC: 128 MG/DL (ref 65–140)
GLUCOSE SERPL-MCNC: 151 MG/DL (ref 65–140)
HCT VFR BLD AUTO: 31.1 % (ref 36.5–49.3)
HGB BLD-MCNC: 9.5 G/DL (ref 12–17)
MCH RBC QN AUTO: 24.6 PG (ref 26.8–34.3)
MCHC RBC AUTO-ENTMCNC: 30.5 G/DL (ref 31.4–37.4)
MCV RBC AUTO: 81 FL (ref 82–98)
PLATELET # BLD AUTO: 66 THOUSANDS/UL (ref 149–390)
PMV BLD AUTO: 10.2 FL (ref 8.9–12.7)
POTASSIUM SERPL-SCNC: 3.2 MMOL/L (ref 3.5–5.3)
RBC # BLD AUTO: 3.86 MILLION/UL (ref 3.88–5.62)
SODIUM SERPL-SCNC: 137 MMOL/L (ref 135–147)
WBC # BLD AUTO: 5.11 THOUSAND/UL (ref 4.31–10.16)

## 2025-06-10 PROCEDURE — 94762 N-INVAS EAR/PLS OXIMTRY CONT: CPT

## 2025-06-10 PROCEDURE — 99239 HOSP IP/OBS DSCHRG MGMT >30: CPT | Performed by: INTERNAL MEDICINE

## 2025-06-10 PROCEDURE — 85027 COMPLETE CBC AUTOMATED: CPT | Performed by: STUDENT IN AN ORGANIZED HEALTH CARE EDUCATION/TRAINING PROGRAM

## 2025-06-10 PROCEDURE — 82948 REAGENT STRIP/BLOOD GLUCOSE: CPT

## 2025-06-10 PROCEDURE — 80048 BASIC METABOLIC PNL TOTAL CA: CPT | Performed by: STUDENT IN AN ORGANIZED HEALTH CARE EDUCATION/TRAINING PROGRAM

## 2025-06-10 RX ORDER — LACTULOSE 10 G/15ML
30 SOLUTION ORAL 2 TIMES DAILY
Qty: 240 ML | Refills: 0 | Status: SHIPPED | OUTPATIENT
Start: 2025-06-10 | End: 2025-06-11 | Stop reason: SDUPTHER

## 2025-06-10 RX ORDER — OXYCODONE HYDROCHLORIDE 5 MG/1
10 TABLET ORAL EVERY 4 HOURS PRN
Qty: 30 TABLET | Refills: 0 | Status: SHIPPED | OUTPATIENT
Start: 2025-06-10 | End: 2025-06-20

## 2025-06-10 RX ADMIN — THIAMINE HCL TAB 100 MG 250 MG: 100 TAB at 08:50

## 2025-06-10 RX ADMIN — LIDOCAINE 5% 1 PATCH: 700 PATCH TOPICAL at 09:05

## 2025-06-10 RX ADMIN — PANCRELIPASE 36000 UNITS: 120000; 24000; 76000 CAPSULE, DELAYED RELEASE PELLETS ORAL at 09:12

## 2025-06-10 RX ADMIN — OXYCODONE HYDROCHLORIDE 5 MG: 5 TABLET ORAL at 12:16

## 2025-06-10 RX ADMIN — PANTOPRAZOLE SODIUM 40 MG: 40 TABLET, DELAYED RELEASE ORAL at 06:04

## 2025-06-10 RX ADMIN — FOLIC ACID 1 MG: 1 TABLET ORAL at 08:50

## 2025-06-10 RX ADMIN — HYDROMORPHONE HYDROCHLORIDE 0.5 MG: 1 INJECTION, SOLUTION INTRAMUSCULAR; INTRAVENOUS; SUBCUTANEOUS at 08:47

## 2025-06-10 RX ADMIN — CARVEDILOL 6.25 MG: 6.25 TABLET, FILM COATED ORAL at 09:21

## 2025-06-10 RX ADMIN — INSULIN LISPRO 1 UNITS: 100 INJECTION, SOLUTION INTRAVENOUS; SUBCUTANEOUS at 12:22

## 2025-06-10 RX ADMIN — OXYCODONE HYDROCHLORIDE 5 MG: 5 TABLET ORAL at 03:42

## 2025-06-10 RX ADMIN — ENOXAPARIN SODIUM 40 MG: 40 INJECTION SUBCUTANEOUS at 08:50

## 2025-06-10 RX ADMIN — LACTULOSE 30 G: 10 SOLUTION ORAL at 08:50

## 2025-06-10 RX ADMIN — PANCRELIPASE 36000 UNITS: 120000; 24000; 76000 CAPSULE, DELAYED RELEASE PELLETS ORAL at 12:17

## 2025-06-10 RX ADMIN — Medication 1 TABLET: at 08:50

## 2025-06-10 NOTE — CASE MANAGEMENT
Case Management Progress Note    Patient name Ashwin Wright  Location /-01 MRN [id number]  : 1975 Date 6/10/2025       LOS (days): 0  Geometric Mean LOS (GMLOS) (days):   Days to GMLOS:        OBJECTIVE:        Current admission status: Observation  Preferred Pharmacy:   [id number] [place], [place] - [address] [address]  Phone: [phone] Fax: [phone]    Primary Care Provider: LUANA Mejía    Primary Insurance: BLUE CROSS  Secondary Insurance:     PROGRESS NOTE:  Patient reviewed during SLIM rounds today and he is to be discharged today. CM did acknowledge the Level III rec from PT/OT but SLIM reported that patient is independent with no need for CM intervention at this time. CM Dept will remain available through [place].

## 2025-06-10 NOTE — PLAN OF CARE
Problem: PAIN - ADULT  Goal: Verbalizes/displays adequate comfort level or baseline comfort level  Description: Interventions:  - Encourage patient to monitor pain and request assistance  - Assess pain using appropriate pain scale  - Administer analgesics as ordered based on type and severity of pain and evaluate response  - Implement non-pharmacological measures as appropriate and evaluate response  - Consider cultural and social influences on pain and pain management  - Notify physician/advanced practitioner if interventions unsuccessful or patient reports new pain  - Educate patient/family on pain management process including their role and importance of  reporting pain   - Provide non-pharmacologic/complimentary pain relief interventions  Outcome: Progressing     Problem: INFECTION - ADULT  Goal: Absence or prevention of progression during hospitalization  Description: INTERVENTIONS:  - Assess and monitor for signs and symptoms of infection  - Monitor lab/diagnostic results  - Monitor all insertion sites, i.e. indwelling lines, tubes, and drains  - Monitor endotracheal if appropriate and nasal secretions for changes in amount and color  - [place] appropriate cooling/warming therapies per order  - Administer medications as ordered  - Instruct and encourage patient and family to use good hand hygiene technique  - Identify and instruct in appropriate isolation precautions for identified infection/condition  Outcome: Progressing  Goal: Absence of fever/infection during neutropenic period  Description: INTERVENTIONS:  - Monitor WBC  - Perform strict hand hygiene  - Limit to healthy visitors only  - No plants, dried, fresh or silk flowers with lake in patient room  Outcome: Progressing     Problem: SAFETY ADULT  Goal: Patient will remain free of falls  Description: INTERVENTIONS:  - Educate patient/family on patient safety including physical limitations  - Instruct patient to call for assistance with activity   -  Consider consulting OT/PT to assist with strengthening/mobility based on AM PAC & JH-HLM score  - Consult OT/PT to assist with strengthening/mobility   - Keep Call bell within reach  - Keep bed low and locked with side rails adjusted as appropriate  - Keep care items and personal belongings within reach  - Initiate and maintain comfort rounds  - Make Fall Risk Sign visible to staff  - Offer Toileting every 2 Hours, in advance of need  - Initiate/Maintain bed alarm  - Obtain necessary fall risk management equipment  - Apply yellow socks and bracelet for high fall risk patients  - Consider moving patient to room near nurses station  Outcome: Progressing  Goal: Maintain or return to baseline ADL function  Description: INTERVENTIONS:  -  Assess patient's ability to carry out ADLs; assess patient's baseline for ADL function and identify physical deficits which impact ability to perform ADLs (bathing, care of mouth/teeth, toileting, grooming, dressing, etc.)  - Assess/evaluate cause of self-care deficits   - Assess range of motion  - Assess patient's mobility; develop plan if impaired  - Assess patient's need for assistive devices and provide as appropriate  - Encourage maximum independence but intervene and supervise when necessary  - Involve family in performance of ADLs  - Assess for home care needs following discharge   - Consider OT consult to assist with ADL evaluation and planning for discharge  - Provide patient education as appropriate  - Monitor functional capacity and physical performance, use of AM PAC & JH-HLM   - Monitor gait, balance and fatigue with ambulation    Outcome: Progressing  Goal: Maintains/Returns to pre admission functional level  Description: INTERVENTIONS:  - Perform AM-PAC 6 Click Basic Mobility/ Daily Activity assessment daily.  - Set and communicate daily mobility goal to care team and patient/family/caregiver.   - Collaborate with rehabilitation services on mobility goals if consulted  -  Perform Range of Motion 3 times a day.  - Reposition patient every 2 hours.  - Dangle patient 3 times a day  - Stand patient 3 times a day  - Ambulate patient 3 times a day  - Out of bed to chair 3 times a day   - Out of bed for meals 3 times a day  - Out of bed for toileting  - Record patient progress and toleration of activity level   Outcome: Progressing     Problem: DISCHARGE PLANNING  Goal: Discharge to home or other facility with appropriate resources  Description: INTERVENTIONS:  - Identify barriers to discharge w/patient and caregiver  - Arrange for needed discharge resources and transportation as appropriate  - Identify discharge learning needs (meds, wound care, etc.)  - Arrange for interpretive services to assist at discharge as needed  - Refer to Case Management Department for coordinating discharge planning if the patient needs post-hospital services based on physician/advanced practitioner order or complex needs related to functional status, cognitive ability, or social support system  Outcome: Progressing     Problem: Knowledge Deficit  Goal: Patient/family/caregiver demonstrates understanding of disease process, treatment plan, medications, and discharge instructions  Description: Complete learning assessment and assess knowledge base.  Interventions:  - Provide teaching at level of understanding  - Provide teaching via preferred learning methods  Outcome: Progressing

## 2025-06-10 NOTE — PLAN OF CARE
Problem: PAIN - ADULT  Goal: Verbalizes/displays adequate comfort level or baseline comfort level  Description: Interventions:  - Encourage patient to monitor pain and request assistance  - Assess pain using appropriate pain scale  - Administer analgesics as ordered based on type and severity of pain and evaluate response  - Implement non-pharmacological measures as appropriate and evaluate response  - Consider cultural and social influences on pain and pain management  - Notify physician/advanced practitioner if interventions unsuccessful or patient reports new pain  - Educate patient/family on pain management process including their role and importance of  reporting pain   - Provide non-pharmacologic/complimentary pain relief interventions  Outcome: Progressing     Problem: INFECTION - ADULT  Goal: Absence or prevention of progression during hospitalization  Description: INTERVENTIONS:  - Assess and monitor for signs and symptoms of infection  - Monitor lab/diagnostic results  - Monitor all insertion sites, i.e. indwelling lines, tubes, and drains  - Monitor endotracheal if appropriate and nasal secretions for changes in amount and color  - [place] appropriate cooling/warming therapies per order  - Administer medications as ordered  - Instruct and encourage patient and family to use good hand hygiene technique  - Identify and instruct in appropriate isolation precautions for identified infection/condition  Outcome: Progressing  Goal: Absence of fever/infection during neutropenic period  Description: INTERVENTIONS:  - Monitor WBC  - Perform strict hand hygiene  - Limit to healthy visitors only  - No plants, dried, fresh or silk flowers with lake in patient room  Outcome: Progressing     Problem: SAFETY ADULT  Goal: Patient will remain free of falls  Description: INTERVENTIONS:  - Educate patient/family on patient safety including physical limitations  - Instruct patient to call for assistance with activity   -  Consider consulting OT/PT to assist with strengthening/mobility based on AM PAC & JH-HLM score  - Consult OT/PT to assist with strengthening/mobility   - Keep Call bell within reach  - Keep bed low and locked with side rails adjusted as appropriate  - Keep care items and personal belongings within reach  - Initiate and maintain comfort rounds  - Make Fall Risk Sign visible to staff  - Offer Toileting every 3 Hours, in advance of need  - Initiate/Maintain alarm  - Obtain necessary fall risk management equipment  - Apply yellow socks and bracelet for high fall risk patients  - Consider moving patient to room near nurses station  Outcome: Progressing  Goal: Maintain or return to baseline ADL function  Description: INTERVENTIONS:  -  Assess patient's ability to carry out ADLs; assess patient's baseline for ADL function and identify physical deficits which impact ability to perform ADLs (bathing, care of mouth/teeth, toileting, grooming, dressing, etc.)  - Assess/evaluate cause of self-care deficits   - Assess range of motion  - Assess patient's mobility; develop plan if impaired  - Assess patient's need for assistive devices and provide as appropriate  - Encourage maximum independence but intervene and supervise when necessary  - Involve family in performance of ADLs  - Assess for home care needs following discharge   - Consider OT consult to assist with ADL evaluation and planning for discharge  - Provide patient education as appropriate  - Monitor functional capacity and physical performance, use of AM PAC & JH-HLM   - Monitor gait, balance and fatigue with ambulation    Outcome: Progressing  Goal: Maintains/Returns to pre admission functional level  Description: INTERVENTIONS:  - Perform AM-PAC 6 Click Basic Mobility/ Daily Activity assessment daily.  - Set and communicate daily mobility goal to care team and patient/family/caregiver.   - Collaborate with rehabilitation services on mobility goals if consulted  -  Perform Range of Motion 3 times a day.  - Reposition patient every 3 hours.  - Dangle patient 3 times a day  - Stand patient 3 times a day  - Ambulate patient 3 times a day  - Out of bed to chair 3 times a day   - Out of bed for meals 3 times a day  - Out of bed for toileting  - Record patient progress and toleration of activity level   Outcome: Progressing     Problem: DISCHARGE PLANNING  Goal: Discharge to home or other facility with appropriate resources  Description: INTERVENTIONS:  - Identify barriers to discharge w/patient and caregiver  - Arrange for needed discharge resources and transportation as appropriate  - Identify discharge learning needs (meds, wound care, etc.)  - Arrange for interpretive services to assist at discharge as needed  - Refer to Case Management Department for coordinating discharge planning if the patient needs post-hospital services based on physician/advanced practitioner order or complex needs related to functional status, cognitive ability, or social support system  Outcome: Progressing     Problem: Knowledge Deficit  Goal: Patient/family/caregiver demonstrates understanding of disease process, treatment plan, medications, and discharge instructions  Description: Complete learning assessment and assess knowledge base.  Interventions:  - Provide teaching at level of understanding  - Provide teaching via preferred learning methods  Outcome: Progressing

## 2025-06-10 NOTE — ASSESSMENT & PLAN NOTE
Patient has severe back pain 10/10 in intensity on admission.  T1-T4 compression fractures presenting with worsening back pain radiating down bilateral legs. Very low concern for cauda equina - post void residual was 10mL, he had pin prick sensation. Bilateral LE strength is 3/5 and he is having pain with ambulating. ER discussed with neuro surg on-call in [place] who does not think his new symptoms are not related to his compression fractures.  CT Subacute fractures along the superior endplates of T2-T5 with minimal interval loss of vertebral body height since 5/15/2025 (however less than 25% height loss overall). No retropulsion into the spinal canal or involvement of the posterior elements.  No new fractures. No traumatic subluxation.  PT/OT recommended level 3  Will have outpatient follow-up with neurosurgery and repeat x-rays in approximately 4 weeks

## 2025-06-10 NOTE — DISCHARGE SUMMARY
Discharge Summary - Hospitalist   Name: Ashwin Wright 50 y.o. male I MRN: [id number]  Unit/Bed#: -01 I Date of Admission: 6/8/2025   Date of Service: 6/10/2025 I Hospital Day: 0     Assessment & Plan  Thoracic compression fracture (HCC)  Patient has severe back pain 10/10 in intensity on admission.  T1-T4 compression fractures presenting with worsening back pain radiating down bilateral legs. Very low concern for cauda equina - post void residual was 10mL, he had pin prick sensation. Bilateral LE strength is 3/5 and he is having pain with ambulating. ER discussed with neuro surg on-call in [place] who does not think his new symptoms are not related to his compression fractures.  CT Subacute fractures along the superior endplates of T2-T5 with minimal interval loss of vertebral body height since 5/15/2025 (however less than 25% height loss overall). No retropulsion into the spinal canal or involvement of the posterior elements.  No new fractures. No traumatic subluxation.  PT/OT recommended level 3  Will have outpatient follow-up with neurosurgery and repeat x-rays in approximately 4 weeks  Chronic pancreatitis (HCC)  Continue Creon 36,000 units 3 times daily with meals.  No evidence of acute pancreatitis clinically  GERD (gastroesophageal reflux disease)  Patient takes PPI in the morning, Pepcid in the evening.  Continue home regimen  Severe protein-calorie malnutrition (HCC)  Malnutrition Findings:                        High-protein diet advised         BMI Findings:           Body mass index is 26.14 kg/m².     Controlled type 2 diabetes mellitus with hyperglycemia, with long-term current use of insulin ([facility])  Lab Results   Component Value Date    HGBA1C 5.6 05/12/2025       Recent Labs     06/09/25  1632 06/09/25  2120 06/09/25  2247 06/10/25  0741   POCGLU 115 157* 120 128       Blood Sugar Average: Last 72 hrs:  (P) [id number]  Hold Jardiance.  Lantus 15 units subcu daily in the morning.   Patient does not take any mealtime insulins.  Will do blood sugar monitoring and sliding scale insulin coverage  Cirrhosis of liver without ascites, unspecified hepatic cirrhosis type (HCC)  Patient to continue lactulose as he has high risk of hepatic encephalopathy  Alcohol use disorder, severe, dependence (HCC)  Continue thiamine, folic acid and multivitamin  On [facility] protocol       Discharging Physician / Practitioner: Sergio Ochoa MD  PCP: LUANA Mejía  Admission Date:   Admission Orders (From admission, onward)       Ordered        06/08/25 1501  Place in Observation  Once                          Discharge Date: 06/10/25    Medical Problems       Resolved Problems  Date Reviewed: 6/8/2025   None         Consultations During Hospital Stay:  Neurosurgery    Procedures Performed:   none    Significant Findings / Test Results:   CT spine thoracic and lumbar wo contrast  Result Date: 6/8/2025  Impression: Subacute fractures along the superior endplates of T2-T5 with minimal interval loss of vertebral body height since 5/15/2025 (however less than 25% height loss overall). No retropulsion into the spinal canal or involvement of the posterior elements. No new fractures. No traumatic subluxation. Workstation performed: [id number]      Incidental Findings:   none     Test Results Pending at Discharge (will require follow up):   none     Outpatient Tests Requested:  none    Complications:  none    Reason for Admission: Thoracic compression fracture    Hospital Course:     Ashwin Wright is a 50 y.o. male patient who originally presented to the hospital on 6/8/2025 with past medical history significant chronic pancreatitis, GERD, type 2 diabetes, cirrhosis initially presented with severe back pain noted to have T1-T4 compression fractures was treated with conservative management with improvement of pain we will have repeat x-rays with neurosurgery as an outpatient  Follow-up outpatient with primary care doctor  "approximate week      Please see above list of diagnoses and related plan for additional information.     Condition at Discharge: stable     Discharge Day Visit / Exam:     Subjective:   Denies fevers, chills, cough, abdominal pain  Vitals: Blood Pressure: 146/84 (06/10/25 0846)  Pulse: 68 (06/10/25 0846)  Temperature: 97.7 °F (36.5 °C) (06/10/25 0743)  Temp Source: Oral (06/10/25 0743)  Respirations: 20 (06/10/25 0846)  Height: 5' 11\" (180.3 cm) (06/09/25 2252)  Weight - Scale: 85 kg (187 lb 6.3 oz) (06/09/25 2252)  SpO2: 97 % (06/10/25 0846)  Exam:   Physical Exam  Constitutional:       General: He is not in acute distress.     Appearance: He is well-developed. He is not diaphoretic.   HENT:      Head: Normocephalic and atraumatic.      Nose: Nose normal.      Mouth/Throat:      Pharynx: No oropharyngeal exudate.     Eyes:      General: No scleral icterus.     Conjunctiva/sclera: Conjunctivae normal.       Cardiovascular:      Rate and Rhythm: Normal rate and regular rhythm.      Heart sounds: Normal heart sounds. No murmur heard.     No friction rub. No gallop.   Pulmonary:      Effort: Pulmonary effort is normal. No respiratory distress.      Breath sounds: Normal breath sounds. No wheezing or rales.   Chest:      Chest wall: No tenderness.   Abdominal:      General: Bowel sounds are normal. There is no distension.      Palpations: Abdomen is soft.      Tenderness: There is no abdominal tenderness. There is no guarding.     Musculoskeletal:         General: No tenderness or deformity. Normal range of motion.      Cervical back: Normal range of motion and neck supple.     Skin:     General: Skin is warm and dry.      Findings: No erythema.     Neurological:      Mental Status: He is alert. Mental status is at baseline.       (   Discussion with Family: pt, declined family update    Discharge instructions/Information to patient and family:   See after visit summary for information provided to patient and family.  "     Provisions for Follow-Up Care:  See after visit summary for information related to follow-up care and any pertinent home health orders.      Disposition:     Home    For Discharges to [facility] SNF:   Not Applicable to this Patient - Not Applicable to this Patient    Planned Readmission: none     Discharge Statement:  I spent 60 minutes discharging the patient. This time was spent on the day of discharge. I had direct contact with the patient on the day of discharge. Greater than 50% of the total time was spent examining patient, answering all patient questions, arranging and discussing plan of care with patient as well as directly providing post-discharge instructions.  Additional time then spent on discharge activities.    Discharge Medications:  See after visit summary for reconciled discharge medications provided to patient and family.      ** Please Note: This note has been constructed using a voice recognition system **

## 2025-06-10 NOTE — ASSESSMENT & PLAN NOTE
Lab Results   Component Value Date    HGBA1C 5.6 05/12/2025       Recent Labs     06/09/25  1632 06/09/25  2120 06/09/25  2247 06/10/25  0741   POCGLU 115 157* 120 128       Blood Sugar Average: Last 72 hrs:  (P) [id number]  Hold Jardiance.  Lantus 15 units subcu daily in the morning.  Patient does not take any mealtime insulins.  Will do blood sugar monitoring and sliding scale insulin coverage

## 2025-06-11 DIAGNOSIS — K59.00 CONSTIPATION: ICD-10-CM

## 2025-06-11 DIAGNOSIS — K76.82 HEPATIC ENCEPHALOPATHY (HCC): Primary | ICD-10-CM

## 2025-06-11 RX ORDER — LACTULOSE 10 G/15ML
30 SOLUTION ORAL 3 TIMES DAILY
Qty: 4050 ML | Refills: 5 | Status: SHIPPED | OUTPATIENT
Start: 2025-06-11

## 2025-06-11 NOTE — PROGRESS NOTES
April Michaels him and his wife are bot coming in on 6/12 they both were in at the same time and in rehab as well

## 2025-06-12 ENCOUNTER — TELEPHONE (OUTPATIENT)
Age: 50
End: 2025-06-12

## 2025-06-12 PROBLEM — J45.909 ASTHMA DUE TO SEASONAL ALLERGIES: Status: ACTIVE | Noted: 2025-06-12

## 2025-06-12 PROBLEM — F11.20 OPIOID DEPENDENCE, UNCOMPLICATED (HCC): Status: ACTIVE | Noted: 2025-06-12

## 2025-06-12 NOTE — TELEPHONE ENCOUNTER
Case ID: [id number]  Note from payer: Approved. XIFAXAN 550MG Tablet is approved from 06/12/2025 to 06/12/2026.    This medication is a possible high dollar medication. The patient has not been contacted regarding the decision as the office clinical team will handle advising the patient and if/any patient assistance that may have been started.  Thank you.

## 2025-06-12 NOTE — TELEPHONE ENCOUNTER
Patient called to check on status of disability forms, advised they were completed and faxed on 5/30.

## 2025-06-12 NOTE — TELEPHONE ENCOUNTER
I spoke directly with patient, he is aware medication is approved/authorized and we discussed the patient assistance program should he need it

## 2025-06-12 NOTE — TELEPHONE ENCOUNTER
PA for XIFAXAN 550MG SUBMITTED to       via      [x][email or web address]-Case ID # [id number]       [x]PA sent as URGENT      All office notes, labs and other pertaining documents and studies sent. Clinical questions answered. Awaiting determination from insurance company.     Turnaround time for your insurance to make a decision on your Prior Authorization can take 7-21 business days.

## 2025-06-12 NOTE — TELEPHONE ENCOUNTER
Caller: Patient     Doctor: Dr. Hodges    Reason for call: Patient called requested completed paperwork be re-faxed to fax#: [phone] and placed in patients my chart.     Call back#: [phone]

## 2025-06-12 NOTE — TELEPHONE ENCOUNTER
Patient has been added to the Medication Management and Talk Therapy wait list with a referral.    Insurance: [facility]  Insurance Type:    Commercial [x]   Medicaid []   [facility] (if applicable)   Medicare []  Location Preference:   Provider Preference:   Virtual: Yes [] No []  Were outside resources sent: Yes [x] No []

## 2025-06-22 ENCOUNTER — HOSPITAL ENCOUNTER (EMERGENCY)
Facility: HOSPITAL | Age: 50
End: 2025-06-23
Attending: EMERGENCY MEDICINE | Admitting: EMERGENCY MEDICINE
Payer: COMMERCIAL

## 2025-06-22 DIAGNOSIS — F10.929 ALCOHOL INTOXICATION (HCC): Primary | ICD-10-CM

## 2025-06-22 DIAGNOSIS — F10.10 ALCOHOL ABUSE: ICD-10-CM

## 2025-06-22 LAB
ALBUMIN SERPL BCG-MCNC: 4 G/DL (ref 3.5–5)
ALP SERPL-CCNC: 86 U/L (ref 34–104)
ALT SERPL W P-5'-P-CCNC: 16 U/L (ref 7–52)
ANION GAP SERPL CALCULATED.3IONS-SCNC: 12 MMOL/L (ref 4–13)
AST SERPL W P-5'-P-CCNC: 39 U/L (ref 13–39)
BASOPHILS # BLD AUTO: 0.08 THOUSANDS/ÂΜL (ref 0–0.1)
BASOPHILS NFR BLD AUTO: 1 % (ref 0–1)
BILIRUB SERPL-MCNC: 0.72 MG/DL (ref 0.2–1)
BUN SERPL-MCNC: 5 MG/DL (ref 5–25)
CALCIUM SERPL-MCNC: 8.7 MG/DL (ref 8.4–10.2)
CHLORIDE SERPL-SCNC: 110 MMOL/L (ref 96–108)
CO2 SERPL-SCNC: 25 MMOL/L (ref 21–32)
CREAT SERPL-MCNC: 0.44 MG/DL (ref 0.6–1.3)
EOSINOPHIL # BLD AUTO: 0.06 THOUSAND/ÂΜL (ref 0–0.61)
EOSINOPHIL NFR BLD AUTO: 1 % (ref 0–6)
ERYTHROCYTE [DISTWIDTH] IN BLOOD BY AUTOMATED COUNT: 23 % (ref 11.6–15.1)
ETHANOL SERPL-MCNC: 446 MG/DL
GFR SERPL CREATININE-BSD FRML MDRD: 131 ML/MIN/1.73SQ M
GLUCOSE SERPL-MCNC: 116 MG/DL (ref 65–140)
GLUCOSE SERPL-MCNC: 120 MG/DL (ref 65–140)
HCT VFR BLD AUTO: 37 % (ref 36.5–49.3)
HGB BLD-MCNC: 11.2 G/DL (ref 12–17)
IMM GRANULOCYTES # BLD AUTO: 0.02 THOUSAND/UL (ref 0–0.2)
IMM GRANULOCYTES NFR BLD AUTO: 0 % (ref 0–2)
LACTATE SERPL-SCNC: 3.1 MMOL/L (ref 0.5–2)
LIPASE SERPL-CCNC: 19 U/L (ref 11–82)
LYMPHOCYTES # BLD AUTO: 2.34 THOUSANDS/ÂΜL (ref 0.6–4.47)
LYMPHOCYTES NFR BLD AUTO: 36 % (ref 14–44)
MAGNESIUM SERPL-MCNC: 1.6 MG/DL (ref 1.9–2.7)
MCH RBC QN AUTO: 24.1 PG (ref 26.8–34.3)
MCHC RBC AUTO-ENTMCNC: 30.3 G/DL (ref 31.4–37.4)
MCV RBC AUTO: 80 FL (ref 82–98)
MONOCYTES # BLD AUTO: 0.6 THOUSAND/ÂΜL (ref 0.17–1.22)
MONOCYTES NFR BLD AUTO: 9 % (ref 4–12)
NEUTROPHILS # BLD AUTO: 3.47 THOUSANDS/ÂΜL (ref 1.85–7.62)
NEUTS SEG NFR BLD AUTO: 53 % (ref 43–75)
NRBC BLD AUTO-RTO: 0 /100 WBCS
PLATELET # BLD AUTO: 181 THOUSANDS/UL (ref 149–390)
PMV BLD AUTO: 9.5 FL (ref 8.9–12.7)
POTASSIUM SERPL-SCNC: 3.4 MMOL/L (ref 3.5–5.3)
PROT SERPL-MCNC: 7.3 G/DL (ref 6.4–8.4)
RBC # BLD AUTO: 4.65 MILLION/UL (ref 3.88–5.62)
SODIUM SERPL-SCNC: 147 MMOL/L (ref 135–147)
WBC # BLD AUTO: 6.57 THOUSAND/UL (ref 4.31–10.16)

## 2025-06-22 PROCEDURE — 85025 COMPLETE CBC W/AUTO DIFF WBC: CPT | Performed by: EMERGENCY MEDICINE

## 2025-06-22 PROCEDURE — 99285 EMERGENCY DEPT VISIT HI MDM: CPT | Performed by: EMERGENCY MEDICINE

## 2025-06-22 PROCEDURE — 96365 THER/PROPH/DIAG IV INF INIT: CPT

## 2025-06-22 PROCEDURE — 83690 ASSAY OF LIPASE: CPT | Performed by: EMERGENCY MEDICINE

## 2025-06-22 PROCEDURE — 82948 REAGENT STRIP/BLOOD GLUCOSE: CPT

## 2025-06-22 PROCEDURE — 83735 ASSAY OF MAGNESIUM: CPT | Performed by: EMERGENCY MEDICINE

## 2025-06-22 PROCEDURE — 83605 ASSAY OF LACTIC ACID: CPT | Performed by: EMERGENCY MEDICINE

## 2025-06-22 PROCEDURE — 36415 COLL VENOUS BLD VENIPUNCTURE: CPT | Performed by: EMERGENCY MEDICINE

## 2025-06-22 PROCEDURE — 93005 ELECTROCARDIOGRAM TRACING: CPT

## 2025-06-22 PROCEDURE — 96367 TX/PROPH/DG ADDL SEQ IV INF: CPT

## 2025-06-22 PROCEDURE — 82077 ASSAY SPEC XCP UR&BREATH IA: CPT | Performed by: EMERGENCY MEDICINE

## 2025-06-22 PROCEDURE — 99284 EMERGENCY DEPT VISIT MOD MDM: CPT

## 2025-06-22 PROCEDURE — 80053 COMPREHEN METABOLIC PANEL: CPT | Performed by: EMERGENCY MEDICINE

## 2025-06-22 PROCEDURE — 96366 THER/PROPH/DIAG IV INF ADDON: CPT

## 2025-06-22 RX ORDER — MAGNESIUM SULFATE HEPTAHYDRATE 40 MG/ML
2 INJECTION, SOLUTION INTRAVENOUS ONCE
Status: COMPLETED | OUTPATIENT
Start: 2025-06-22 | End: 2025-06-23

## 2025-06-22 RX ORDER — POTASSIUM CHLORIDE 1500 MG/1
20 TABLET, EXTENDED RELEASE ORAL ONCE
Status: COMPLETED | OUTPATIENT
Start: 2025-06-22 | End: 2025-06-22

## 2025-06-22 RX ORDER — ACETAMINOPHEN 325 MG/1
650 TABLET ORAL ONCE
Status: COMPLETED | OUTPATIENT
Start: 2025-06-22 | End: 2025-06-22

## 2025-06-22 RX ADMIN — SODIUM CHLORIDE 1000 ML: 0.9 INJECTION, SOLUTION INTRAVENOUS at 21:01

## 2025-06-22 RX ADMIN — ACETAMINOPHEN 650 MG: 325 TABLET ORAL at 22:27

## 2025-06-22 RX ADMIN — POTASSIUM CHLORIDE 20 MEQ: 1500 TABLET, EXTENDED RELEASE ORAL at 22:27

## 2025-06-22 RX ADMIN — MAGNESIUM SULFATE HEPTAHYDRATE 2 G: 40 INJECTION, SOLUTION INTRAVENOUS at 22:13

## 2025-06-22 RX ADMIN — FOLIC ACID 1 MG: 5 INJECTION, SOLUTION INTRAMUSCULAR; INTRAVENOUS; SUBCUTANEOUS at 21:01

## 2025-06-23 ENCOUNTER — HOSPITAL ENCOUNTER (INPATIENT)
Facility: HOSPITAL | Age: 50
LOS: 4 days | Discharge: HOME | End: 2025-06-27
Attending: INTERNAL MEDICINE | Admitting: INTERNAL MEDICINE
Payer: COMMERCIAL

## 2025-06-23 VITALS
TEMPERATURE: 98 F | HEART RATE: 78 BPM | OXYGEN SATURATION: 98 % | SYSTOLIC BLOOD PRESSURE: 137 MMHG | RESPIRATION RATE: 18 BRPM | DIASTOLIC BLOOD PRESSURE: 74 MMHG

## 2025-06-23 DIAGNOSIS — K21.9 GASTROESOPHAGEAL REFLUX DISEASE WITHOUT ESOPHAGITIS: ICD-10-CM

## 2025-06-23 DIAGNOSIS — K76.82 HEPATIC ENCEPHALOPATHY (HCC): ICD-10-CM

## 2025-06-23 DIAGNOSIS — F10.20 ALCOHOL USE DISORDER, SEVERE, DEPENDENCE (HCC): Primary | ICD-10-CM

## 2025-06-23 DIAGNOSIS — E78.2 MIXED HYPERLIPIDEMIA: ICD-10-CM

## 2025-06-23 DIAGNOSIS — R03.0 ELEVATED BLOOD PRESSURE READING: ICD-10-CM

## 2025-06-23 DIAGNOSIS — F41.9 ANXIETY: ICD-10-CM

## 2025-06-23 DIAGNOSIS — E78.1 HYPERTRIGLYCERIDEMIA: ICD-10-CM

## 2025-06-23 PROBLEM — K92.0 HEMATEMESIS: Status: ACTIVE | Noted: 2025-01-16

## 2025-06-23 PROBLEM — K86.1 ACUTE ON CHRONIC PANCREATITIS (HCC): Status: RESOLVED | Noted: 2018-04-18 | Resolved: 2025-06-23

## 2025-06-23 PROBLEM — M76.51 PATELLAR TENDINITIS OF RIGHT KNEE: Status: ACTIVE | Noted: 2020-04-21

## 2025-06-23 PROBLEM — Z80.0 FAMILY HISTORY OF PANCREATIC CANCER: Status: ACTIVE | Noted: 2019-11-06

## 2025-06-23 PROBLEM — D69.6 THROMBOCYTOPENIA (HCC): Status: ACTIVE | Noted: 2023-09-20

## 2025-06-23 PROBLEM — K82.8 GALLBLADDER SLUDGE: Status: ACTIVE | Noted: 2017-01-31

## 2025-06-23 PROBLEM — K29.70 GASTRITIS WITHOUT BLEEDING: Status: ACTIVE | Noted: 2017-11-15

## 2025-06-23 PROBLEM — Z71.6 ENCOUNTER FOR SMOKING CESSATION COUNSELING: Status: RESOLVED | Noted: 2017-03-02 | Resolved: 2025-06-23

## 2025-06-23 PROBLEM — F17.200 SMOKER: Status: ACTIVE | Noted: 2019-11-06

## 2025-06-23 PROBLEM — K85.90 ACUTE ON CHRONIC PANCREATITIS (HCC): Status: RESOLVED | Noted: 2018-04-18 | Resolved: 2025-06-23

## 2025-06-23 PROBLEM — R33.9 URINARY RETENTION: Status: ACTIVE | Noted: 2017-08-16

## 2025-06-23 LAB
ATRIAL RATE: 86 BPM
CARDIAC TROPONIN I PNL SERPL HS: 6 NG/L (ref 8–18)
GLUCOSE SERPL-MCNC: 118 MG/DL (ref 65–140)
GLUCOSE SERPL-MCNC: 126 MG/DL (ref 65–140)
GLUCOSE SERPL-MCNC: 129 MG/DL (ref 65–140)
GLUCOSE SERPL-MCNC: 180 MG/DL (ref 65–140)
P AXIS: 49 DEGREES
PR INTERVAL: 138 MS
QRS AXIS: 67 DEGREES
QRSD INTERVAL: 116 MS
QT INTERVAL: 402 MS
QTC INTERVAL: 481 MS
T WAVE AXIS: 31 DEGREES
VENTRICULAR RATE: 86 BPM

## 2025-06-23 PROCEDURE — 93010 ELECTROCARDIOGRAM REPORT: CPT | Performed by: INTERNAL MEDICINE

## 2025-06-23 PROCEDURE — HZ2ZZZZ DETOXIFICATION SERVICES FOR SUBSTANCE ABUSE TREATMENT: ICD-10-PCS | Performed by: INTERNAL MEDICINE

## 2025-06-23 PROCEDURE — 84484 ASSAY OF TROPONIN QUANT: CPT

## 2025-06-23 PROCEDURE — 82948 REAGENT STRIP/BLOOD GLUCOSE: CPT

## 2025-06-23 PROCEDURE — 99223 1ST HOSP IP/OBS HIGH 75: CPT | Performed by: EMERGENCY MEDICINE

## 2025-06-23 PROCEDURE — 99223 1ST HOSP IP/OBS HIGH 75: CPT | Performed by: INTERNAL MEDICINE

## 2025-06-23 RX ORDER — PHENOBARBITAL SODIUM 130 MG/ML
130 INJECTION, SOLUTION INTRAMUSCULAR; INTRAVENOUS ONCE
Status: COMPLETED | OUTPATIENT
Start: 2025-06-23 | End: 2025-06-23

## 2025-06-23 RX ORDER — PANTOPRAZOLE SODIUM 40 MG/1
40 TABLET, DELAYED RELEASE ORAL
Status: DISCONTINUED | OUTPATIENT
Start: 2025-06-23 | End: 2025-06-27 | Stop reason: HOSPADM

## 2025-06-23 RX ORDER — FENOFIBRATE 145 MG/1
145 TABLET, FILM COATED ORAL DAILY
Status: DISCONTINUED | OUTPATIENT
Start: 2025-06-23 | End: 2025-06-27 | Stop reason: HOSPADM

## 2025-06-23 RX ORDER — ALBUTEROL SULFATE 90 UG/1
2 INHALANT RESPIRATORY (INHALATION) EVERY 6 HOURS PRN
Status: DISCONTINUED | OUTPATIENT
Start: 2025-06-23 | End: 2025-06-27 | Stop reason: HOSPADM

## 2025-06-23 RX ORDER — BUSPIRONE HYDROCHLORIDE 5 MG/1
5 TABLET ORAL 2 TIMES DAILY
Status: DISCONTINUED | OUTPATIENT
Start: 2025-06-23 | End: 2025-06-27 | Stop reason: HOSPADM

## 2025-06-23 RX ORDER — TRAZODONE HYDROCHLORIDE 50 MG/1
50 TABLET ORAL
Status: DISCONTINUED | OUTPATIENT
Start: 2025-06-23 | End: 2025-06-27 | Stop reason: HOSPADM

## 2025-06-23 RX ORDER — ONDANSETRON 2 MG/ML
4 INJECTION INTRAMUSCULAR; INTRAVENOUS EVERY 6 HOURS PRN
Status: DISCONTINUED | OUTPATIENT
Start: 2025-06-23 | End: 2025-06-27 | Stop reason: HOSPADM

## 2025-06-23 RX ORDER — LANOLIN ALCOHOL/MO/W.PET/CERES
100 CREAM (GRAM) TOPICAL DAILY
Status: DISCONTINUED | OUTPATIENT
Start: 2025-06-23 | End: 2025-06-27 | Stop reason: HOSPADM

## 2025-06-23 RX ORDER — FLUOXETINE 10 MG/1
10 CAPSULE ORAL DAILY
Status: DISCONTINUED | OUTPATIENT
Start: 2025-06-23 | End: 2025-06-25

## 2025-06-23 RX ORDER — HYDROXYZINE HYDROCHLORIDE 25 MG/1
25 TABLET, FILM COATED ORAL ONCE
Status: COMPLETED | OUTPATIENT
Start: 2025-06-23 | End: 2025-06-23

## 2025-06-23 RX ORDER — LACTULOSE 10 G/15ML
30 SOLUTION ORAL 3 TIMES DAILY
Status: DISCONTINUED | OUTPATIENT
Start: 2025-06-23 | End: 2025-06-27 | Stop reason: HOSPADM

## 2025-06-23 RX ORDER — INSULIN LISPRO 100 [IU]/ML
1-6 INJECTION, SOLUTION INTRAVENOUS; SUBCUTANEOUS
Status: DISCONTINUED | OUTPATIENT
Start: 2025-06-23 | End: 2025-06-27 | Stop reason: HOSPADM

## 2025-06-23 RX ORDER — CARVEDILOL 6.25 MG/1
6.25 TABLET ORAL 2 TIMES DAILY WITH MEALS
Status: DISCONTINUED | OUTPATIENT
Start: 2025-06-23 | End: 2025-06-27 | Stop reason: HOSPADM

## 2025-06-23 RX ORDER — DIAZEPAM 10 MG/2ML
10 INJECTION, SOLUTION INTRAMUSCULAR; INTRAVENOUS ONCE
Status: COMPLETED | OUTPATIENT
Start: 2025-06-23 | End: 2025-06-23

## 2025-06-23 RX ORDER — FOLIC ACID 1 MG/1
1 TABLET ORAL DAILY
Status: DISCONTINUED | OUTPATIENT
Start: 2025-06-23 | End: 2025-06-27 | Stop reason: HOSPADM

## 2025-06-23 RX ORDER — ATORVASTATIN CALCIUM 80 MG/1
80 TABLET, FILM COATED ORAL
Status: DISCONTINUED | OUTPATIENT
Start: 2025-06-23 | End: 2025-06-27 | Stop reason: HOSPADM

## 2025-06-23 RX ORDER — FAMOTIDINE 20 MG/1
40 TABLET, FILM COATED ORAL
Status: DISCONTINUED | OUTPATIENT
Start: 2025-06-23 | End: 2025-06-27 | Stop reason: HOSPADM

## 2025-06-23 RX ORDER — LORAZEPAM 0.5 MG/1
0.5 TABLET ORAL EVERY 8 HOURS PRN
Status: DISCONTINUED | OUTPATIENT
Start: 2025-06-23 | End: 2025-06-24

## 2025-06-23 RX ORDER — DEXTROSE MONOHYDRATE AND SODIUM CHLORIDE 5; .9 G/100ML; G/100ML
125 INJECTION, SOLUTION INTRAVENOUS CONTINUOUS
Status: DISCONTINUED | OUTPATIENT
Start: 2025-06-23 | End: 2025-06-24

## 2025-06-23 RX ORDER — PHENOBARBITAL 64.8 MG/1
64.8 TABLET ORAL ONCE
Status: COMPLETED | OUTPATIENT
Start: 2025-06-23 | End: 2025-06-23

## 2025-06-23 RX ADMIN — DEXTROSE AND SODIUM CHLORIDE 125 ML/HR: 5; .9 INJECTION, SOLUTION INTRAVENOUS at 02:25

## 2025-06-23 RX ADMIN — DIAZEPAM 10 MG: 10 INJECTION, SOLUTION INTRAMUSCULAR; INTRAVENOUS at 02:25

## 2025-06-23 RX ADMIN — PHENOBARBITAL SODIUM 130 MG: 130 INJECTION INTRAMUSCULAR; INTRAVENOUS at 13:51

## 2025-06-23 RX ADMIN — BUSPIRONE HYDROCHLORIDE 5 MG: 5 TABLET ORAL at 17:00

## 2025-06-23 RX ADMIN — FLUOXETINE HYDROCHLORIDE 10 MG: 10 CAPSULE ORAL at 08:09

## 2025-06-23 RX ADMIN — FENOFIBRATE 145 MG: 145 TABLET, FILM COATED ORAL at 08:09

## 2025-06-23 RX ADMIN — PANCRELIPASE 36000 UNITS: 120000; 24000; 76000 CAPSULE, DELAYED RELEASE PELLETS ORAL at 16:55

## 2025-06-23 RX ADMIN — ATORVASTATIN CALCIUM 80 MG: 80 TABLET, FILM COATED ORAL at 16:55

## 2025-06-23 RX ADMIN — PANCRELIPASE 36000 UNITS: 120000; 24000; 76000 CAPSULE, DELAYED RELEASE PELLETS ORAL at 11:44

## 2025-06-23 RX ADMIN — PHENOBARBITAL SODIUM 130 MG: 130 INJECTION INTRAMUSCULAR; INTRAVENOUS at 08:23

## 2025-06-23 RX ADMIN — LORAZEPAM 0.5 MG: 0.5 TABLET ORAL at 22:43

## 2025-06-23 RX ADMIN — LACTULOSE 30 G: 20 SOLUTION ORAL at 08:08

## 2025-06-23 RX ADMIN — PHENOBARBITAL SODIUM 130 MG: 130 INJECTION INTRAMUSCULAR; INTRAVENOUS at 17:21

## 2025-06-23 RX ADMIN — RIFAXIMIN 550 MG: 550 TABLET ORAL at 08:39

## 2025-06-23 RX ADMIN — LACTULOSE 30 G: 20 SOLUTION ORAL at 17:00

## 2025-06-23 RX ADMIN — CARVEDILOL 6.25 MG: 6.25 TABLET, FILM COATED ORAL at 16:55

## 2025-06-23 RX ADMIN — BUSPIRONE HYDROCHLORIDE 5 MG: 5 TABLET ORAL at 08:09

## 2025-06-23 RX ADMIN — DEXTROSE AND SODIUM CHLORIDE 125 ML/HR: 5; .9 INJECTION, SOLUTION INTRAVENOUS at 20:42

## 2025-06-23 RX ADMIN — LACTULOSE 30 G: 20 SOLUTION ORAL at 20:43

## 2025-06-23 RX ADMIN — PANTOPRAZOLE SODIUM 40 MG: 40 TABLET, DELAYED RELEASE ORAL at 06:16

## 2025-06-23 RX ADMIN — PANCRELIPASE 12000 UNITS: 30000; 6000; 19000 CAPSULE, DELAYED RELEASE PELLETS ORAL at 08:08

## 2025-06-23 RX ADMIN — THIAMINE HCL TAB 100 MG 100 MG: 100 TAB at 08:09

## 2025-06-23 RX ADMIN — MORPHINE SULFATE 2 MG: 2 INJECTION, SOLUTION INTRAMUSCULAR; INTRAVENOUS at 20:45

## 2025-06-23 RX ADMIN — PHENOBARBITAL SODIUM 130 MG: 130 INJECTION INTRAMUSCULAR; INTRAVENOUS at 06:35

## 2025-06-23 RX ADMIN — PHENOBARBITAL 64.8 MG: 64.8 TABLET ORAL at 19:30

## 2025-06-23 RX ADMIN — MULTIPLE VITAMINS W/ MINERALS TAB 1 TABLET: TAB ORAL at 08:09

## 2025-06-23 RX ADMIN — DEXTROSE AND SODIUM CHLORIDE 125 ML/HR: 5; .9 INJECTION, SOLUTION INTRAVENOUS at 20:46

## 2025-06-23 RX ADMIN — DEXTROSE AND SODIUM CHLORIDE 125 ML/HR: 5; .9 INJECTION, SOLUTION INTRAVENOUS at 11:00

## 2025-06-23 RX ADMIN — TRAZODONE HYDROCHLORIDE 50 MG: 50 TABLET ORAL at 22:43

## 2025-06-23 RX ADMIN — PHENOBARBITAL SODIUM 130 MG: 130 INJECTION INTRAMUSCULAR; INTRAVENOUS at 11:44

## 2025-06-23 RX ADMIN — INSULIN LISPRO 1 UNITS: 100 INJECTION, SOLUTION INTRAVENOUS; SUBCUTANEOUS at 08:09

## 2025-06-23 RX ADMIN — RIFAXIMIN 550 MG: 550 TABLET ORAL at 02:24

## 2025-06-23 RX ADMIN — Medication 650 MG: at 02:25

## 2025-06-23 RX ADMIN — CARVEDILOL 6.25 MG: 6.25 TABLET, FILM COATED ORAL at 08:09

## 2025-06-23 RX ADMIN — HYDROXYZINE HYDROCHLORIDE 25 MG: 25 TABLET, FILM COATED ORAL at 10:39

## 2025-06-23 RX ADMIN — PANCRELIPASE 12000 UNITS: 30000; 6000; 19000 CAPSULE, DELAYED RELEASE PELLETS ORAL at 20:45

## 2025-06-23 RX ADMIN — PANCRELIPASE 36000 UNITS: 120000; 24000; 76000 CAPSULE, DELAYED RELEASE PELLETS ORAL at 06:15

## 2025-06-23 RX ADMIN — RIFAXIMIN 550 MG: 550 TABLET ORAL at 20:44

## 2025-06-23 RX ADMIN — ONDANSETRON 4 MG: 2 INJECTION INTRAMUSCULAR; INTRAVENOUS at 02:22

## 2025-06-23 RX ADMIN — FOLIC ACID 1 MG: 1 TABLET ORAL at 08:09

## 2025-06-23 NOTE — PLAN OF CARE
Problem: PAIN - ADULT  Goal: Verbalizes/displays adequate comfort level or baseline comfort level  Description: Interventions:  - Encourage patient to monitor pain and request assistance  - Assess pain using appropriate pain scale  - Administer analgesics as ordered based on type and severity of pain and evaluate response  - Implement non-pharmacological measures as appropriate and evaluate response  - Consider cultural and social influences on pain and pain management  - Notify physician/advanced practitioner if interventions unsuccessful or patient reports new pain  - Educate patient on pain management process including their role and importance of  reporting pain   - Provide non-pharmacologic/complimentary pain relief interventions  Outcome: Progressing     Problem: SAFETY ADULT  Goal: Patient will remain free of falls  Description: INTERVENTIONS:  - Educate patient on patient safety including physical limitations  - Instruct patient to call for assistance with activity   - Keep Call bell within reach  - Keep bed low and locked with side rails adjusted as appropriate  - Keep care items and personal belongings within reach  - Initiate and maintain comfort rounds  - Make Fall Risk Sign visible to staff  - Initiate/Maintain chair alarm  - Apply yellow socks and bracelet for high fall risk patients  - Consider moving patient to room near nurses station  Outcome: Progressing  Goal: Maintain or return to baseline ADL function  Description: INTERVENTIONS:  -  Assess patient's ability to carry out ADLs; assess patient's baseline for ADL function and identify physical deficits which impact ability to perform ADLs (bathing, care of mouth/teeth, toileting, grooming, dressing, etc.)  - Assess/evaluate cause of self-care deficits   - Assess range of motion  - Assess patient's mobility; develop plan if impaired  - Assess patient's need for assistive devices and provide as appropriate  - Provide patient education as  appropriate  - Monitor functional capacity and physical performance, use of AM PAC & JH-HLM   - Monitor gait, balance and fatigue with ambulation    Outcome: Progressing  Goal: Maintains/Returns to pre admission functional level  Description: INTERVENTIONS:  - Perform AM-PAC 6 Click Basic Mobility/ Daily Activity assessment daily.  - Set and communicate daily mobility goal to care team and patient.   - Collaborate with rehabilitation services on mobility goals if consulted  - Out of bed for toileting  - Record patient progress and toleration of activity level   Outcome: Progressing     Problem: DISCHARGE PLANNING  Goal: Discharge to home or other facility with appropriate resources  Description: INTERVENTIONS:  - Identify barriers to discharge w/patient  - Arrange for needed discharge resources and transportation as appropriate  - Identify discharge learning needs (meds, wound care, etc.)  - Arrange for interpretive services to assist at discharge as needed  - Refer to Case Management Department for coordinating discharge planning if the patient needs post-hospital services based on physician/advanced practitioner order or complex needs related to functional status, cognitive ability, or social support system  Outcome: Progressing     Problem: Knowledge Deficit  Goal: Patient/family/caregiver demonstrates understanding of disease process, treatment plan, medications, and discharge instructions  Description: Complete learning assessment and assess knowledge base.  Interventions:  - Provide teaching at level of understanding  - Provide teaching via preferred learning methods  Outcome: Progressing

## 2025-06-23 NOTE — ASSESSMENT & PLAN NOTE
Recent Labs     06/22/25  2132   ETOH 446*     Start symptom triggered phenobarbital administration via protocol   On the unit, received 10 mg IV diazepam and loading dose of 650 mg IV phenobarbital.  Total received: 910 mg phenobarbital 06/23/25 9:29 AM   Current withdrawal symptoms: Anxiety and tremors  Supportive care including IV fluids, antiemetics, nonopioid analgesics, antidiarrheals as needed  Cardiac monitoring and telemetry  Seizure precautions

## 2025-06-23 NOTE — PLAN OF CARE
Problem: PAIN - ADULT  Goal: Verbalizes/displays adequate comfort level or baseline comfort level  Description: Interventions:  - Encourage patient to monitor pain and request assistance  - Assess pain using appropriate pain scale  - Administer analgesics as ordered based on type and severity of pain and evaluate response  - Implement non-pharmacological measures as appropriate and evaluate response  - Consider cultural and social influences on pain and pain management  - Notify physician/advanced practitioner if interventions unsuccessful or patient reports new pain  - Educate patient on pain management process including their role and importance of  reporting pain   - Provide non-pharmacologic/complimentary pain relief interventions  Outcome: Progressing     Problem: SUBSTANCE USE/ABUSE  Goal: By discharge, will develop insight into their chemical dependency and sustain motivation to continue in recovery  Description: INTERVENTIONS:  - Attends all daily group sessions and scheduled AA groups  - Actively practices coping skills through participation in the therapeutic community and adherence to program rules  - Reviews and completes assignments from individual treatment plan  - Assist patient development of understanding of their personal cycle of addiction and relapse triggers  Outcome: Progressing     Problem: Knowledge Deficit  Goal: Patient/family/caregiver demonstrates understanding of disease process, treatment plan, medications, and discharge instructions  Description: Complete learning assessment and assess knowledge base.  Interventions:  - Provide teaching at level of understanding  - Provide teaching via preferred learning methods  6/23/2025 1258 by Gislee Mendoza RN  Outcome: Progressing  6/23/2025 1258 by Gisele Mendoza RN  Outcome: Progressing     Problem: DISCHARGE PLANNING  Goal: Discharge to home or other facility with appropriate resources  Description: INTERVENTIONS:  - Identify barriers to  discharge w/patient  - Arrange for needed discharge resources and transportation as appropriate  - Identify discharge learning needs (meds, wound care, etc.)  - Arrange for interpretive services to assist at discharge as needed  - Refer to Case Management Department for coordinating discharge planning if the patient needs post-hospital services based on physician/advanced practitioner order or complex needs related to functional status, cognitive ability, or social support system  6/23/2025 1258 by Gisele Mendoza, RN  Outcome: Progressing  6/23/2025 1258 by Gisele Mendoza, RN  Outcome: Progressing

## 2025-06-23 NOTE — ED NOTES
"Attempted to BAT pt, pt refused and stated \"kiss my ass\" to this writer. provider and RN made aware.     Durga Louis  06/22/25 2129    "

## 2025-06-23 NOTE — H&P
H&P - Hospitalist   Name: Ashwin Wright 50 y.o. male I MRN: [id number]  Unit/Bed#: 5T DETOX 512-01 I Date of Admission: 6/23/2025   Date of Service: 6/23/2025 I Hospital Day: 1     Assessment & Plan  Alcohol use disorder, severe, dependence (HCC)  History of chronic pancreatitis, cirrhosis of liver, hepatic encephalopathy, esophageal varices, and thoracic vertebral fracture transferred here for alcohol detox  Management per toxicology recommendations with SEWS protocol  Cirrhosis of liver without ascites, unspecified hepatic cirrhosis type (HCC)  Follows with gastroenterology as an outpatient.  Continue carvedilol for history of varices  Thoracic compression fracture (HCC)  Recent imaging with subacute fractures T2-T5  Patient wearing a spinal brace  Controlled type 2 diabetes mellitus with hyperglycemia, with long-term current use of insulin (HCC)  Lab Results   Component Value Date    HGBA1C 5.6 05/12/2025     Recent Labs     06/22/25 2028 06/23/25  0623 06/23/25  1117   POCGLU 120 180* 118     Prior to admission on Jardiance.  Placed on sliding scale insulin during hospitalization  Hypertriglyceridemia  Continue atorvastatin  Chronic pancreatitis (HCC)  Continue Creon  Chronic gastritis without bleeding  Continue famotidine and PPI  Hypertension  Continue carvedilol      VTE Pharmacologic Prophylaxis: VTE Score: 1 Low Risk (Score 0-2) - Encourage Ambulation.  Code Status: Level 1 - Full Code per patient  Discussion with family: Patient declined call to .     Anticipated Length of Stay: Patient will be admitted on an observation basis with an anticipated length of stay of less than 2 midnights secondary to call use disorder with acute alcohol intoxication requiring symptom management continue monitoring.    History of Present Illness   Chief Complaint: Alcohol intoxication    Ashwin Wright is a 50 y.o. male with a PMH of alcohol use disorder, hyperlipidemia, hypertension, non-insulin-dependent  diabetes, chronic pancreatitis, chronic gastritis who presents with alcohol intoxication.  Patient with a longstanding history of alcohol use disorder presents ER for further evaluation treatment withdrawal symptom management.  Patient states that he drinks 2-3/5 of vodka a day as well as 2-3 sixpacks of beer a day has been drinking like this for some time.  Patient had recent med detox stay on 5/15/2025 states that he resumed drinking as soon as he was discharged.    Review of Systems   Constitutional:  Negative for chills and fever.   HENT:  Negative for facial swelling and trouble swallowing.    Eyes:  Negative for visual disturbance.   Respiratory:  Negative for shortness of breath.    Cardiovascular:  Negative for chest pain and palpitations.   Gastrointestinal:  Negative for abdominal distention and abdominal pain.   Genitourinary:  Negative for hematuria.   Musculoskeletal:  Positive for back pain. Negative for myalgias.   Skin:  Negative for rash.   Neurological:  Positive for tremors. Negative for facial asymmetry and speech difficulty.   Psychiatric/Behavioral:  The patient is nervous/anxious.    All other systems reviewed and are negative.      Historical Information   Past Medical History[1]  Past Surgical History[2]  Social History[3]  E-Cigarette/Vaping    E-Cigarette Use Current Some Day User      E-Cigarette/Vaping Substances    Nicotine Yes     THC No     CBD No     Flavoring No     Other No     Unknown No      Family history non-contributory  Social History:  Marital Status: /Civil Union   Patient Pre-hospital Living Situation: With spouse  Patient Pre-hospital Level of Mobility: walks  Patient Pre-hospital Diet Restrictions: Cardiac    Meds/Allergies   I have reviewed home medications with patient personally.  Prior to Admission medications    Medication Sig Start Date End Date Taking? Authorizing Provider   albuterol (PROVENTIL HFA,VENTOLIN HFA) 90 mcg/act inhaler Inhale 2 puffs every 6  (six) hours as needed for wheezing 4/21/25  Yes LUANA Mejía   carvedilol (COREG) 3.125 mg tablet Take 2 tablets (6.25 mg total) by mouth 2 (two) times a day with meals 2/10/25 8/9/25 Yes Rajiv Burgos III, MD   Choline Fenofibrate (Fenofibric Acid) 135 MG CPDR TAKE ONE CAPSULE BY MOUTH DAILY 3/27/25  Yes LUANA Mejía   Empagliflozin 10 MG TABS Take 10 mg by mouth every morning   Yes Historical Provider, MD   esomeprazole (NexIUM) 40 MG capsule Take 1 capsule (40 mg total) by mouth if needed (heartburn) 2/10/25  Yes Rajiv Burgos III, MD   famotidine (PEPCID) 40 MG tablet Take 40 mg by mouth daily at bedtime as needed   Yes Historical Provider, MD   FLUoxetine 10 MG tablet Take 1 tablet (10 mg total) by mouth daily 6/12/25  Yes LUANA Mejía   folic acid (FOLVITE) 1 mg tablet Take 1 tablet (1 mg total) by mouth daily 5/18/25  Yes Eli Cortez PA-C   Insulin Pen Needle (Pen Needles) 32G X 5 MM MISC  4/29/21  Yes Historical Provider, MD   lactulose (CHRONULAC) 10 g/15 mL solution Take 45 mL (30 g total) by mouth 3 (three) times a day 6/11/25  Yes Dinora Mensah PA-C   Multiple Vitamins-Minerals (MULTIVITAMIN ADULTS PO) Take by mouth daily after breakfast   Yes Historical Provider, MD   Pancrelipase, Lip-Prot-Amyl, (Creon) 29988-060540 units CPEP TAKE 1 CAPSULE (36,000 UNITS TOTAL) BY MOUTH 3 (THREE) TIMES A DAY BEFORE MEALS 9/10/24  Yes Dinora Mensah PA-C   rifaximin (XIFAXAN) 550 mg tablet Take 1 tablet (550 mg total) by mouth every 12 (twelve) hours 6/11/25 9/9/25 Yes Dinora Mensah PA-C   rosuvastatin (CRESTOR) 40 MG tablet TAKE ONE TABLET BY MOUTH EVERY DAY 7/5/24  Yes LUANA Mejía   Thiamine HCl (vitamin B-1) 250 MG tablet Take 250 mg by mouth daily 1/22/25 2/21/25  Historical Provider, MD     Allergies   Allergen Reactions    Bee Venom Swelling    Percocet [Oxycodone-Acetaminophen] GI Bleeding, Vomiting and Abdominal Pain       Objective :  Temp:  [97 °F (36.1 °C)-98 °F (36.7 °C)]  97.8 °F (36.6 °C)  HR:  [] 81  BP: (112-137)/(64-89) 112/64  Resp:  [18-20] 18  SpO2:  [91 %-98 %] 91 %  O2 Device: None (Room air)    Physical Exam  Vitals reviewed.   Constitutional:       General: He is not in acute distress.  HENT:      Head: Atraumatic.     Eyes:      General: No scleral icterus.     Extraocular Movements: Extraocular movements intact.       Cardiovascular:      Rate and Rhythm: Regular rhythm.      Heart sounds:      No gallop.   Pulmonary:      Effort: Pulmonary effort is normal. No respiratory distress.   Abdominal:      General: Bowel sounds are normal.      Palpations: Abdomen is soft.      Tenderness: There is no abdominal tenderness. There is no guarding.     Musculoskeletal:         General: Tenderness present. No deformity.     Skin:     General: Skin is warm.      Coloration: Skin is not jaundiced.     Neurological:      General: No focal deficit present.      Mental Status: He is alert.      Motor: No weakness.     Psychiatric:         Mood and Affect: Mood normal.            Lab Results: I have reviewed the following results:  Results from last 7 days   Lab Units 06/22/25  2132   WBC Thousand/uL 6.57   HEMOGLOBIN g/dL 11.2*   HEMATOCRIT % 37.0   PLATELETS Thousands/uL 181   SEGS PCT % 53   LYMPHO PCT % 36   MONO PCT % 9   EOS PCT % 1     Results from last 7 days   Lab Units 06/22/25  2132   SODIUM mmol/L 147   POTASSIUM mmol/L 3.4*   CHLORIDE mmol/L 110*   CO2 mmol/L 25   BUN mg/dL 5   CREATININE mg/dL 0.44*   ANION GAP mmol/L 12   CALCIUM mg/dL 8.7   ALBUMIN g/dL 4.0   TOTAL BILIRUBIN mg/dL 0.72   ALK PHOS U/L 86   ALT U/L 16   AST U/L 39   GLUCOSE RANDOM mg/dL 116         Results from last 7 days   Lab Units 06/22/25 2028   POC GLUCOSE mg/dl 120     Lab Results   Component Value Date    HGBA1C 5.6 05/12/2025    HGBA1C 5.1 01/17/2025    HGBA1C  01/17/2025     Unable to determine A1c due to interfering substance, reflexed to alternate methodology.     Results from last 7 days    Lab Units 06/22/25  2132   LACTIC ACID mmol/L 3.1*       Imaging Results Review: No pertinent imaging studies reviewed.  Other Study Results Review: No additional pertinent studies reviewed.    Administrative Statements   I have spent a total time of 60 minutes in caring for this patient on the day of the visit/encounter including Diagnostic results, Impressions, Documenting in the medical record, Reviewing/placing orders in the medical record (including tests, medications, and/or procedures), and Obtaining or reviewing history  .    ** Please Note: This note has been constructed using a voice recognition system. **         [1]   Past Medical History:  Diagnosis Date    Alcoholism (HCC)     Arthritis     Asthma     Chronic pain disorder     Chronic pancreatitis (HCC)     Cirrhosis (HCC)     early cirrhosis    GERD (gastroesophageal reflux disease)     History of COVID-19 01/2022    mild s/s    Hyperlipidemia     Hypertension     Liver abscess     Liver disease     Meniscus tear     left knee work injury last assessed 08/24/2016    Pancreatitis     Pneumonia     Rotator cuff tear     Stomach disorder     Chronic gastritus   [2]   Past Surgical History:  Procedure Laterality Date    CELIAC PLEXUS BLOCK Bilateral 10/29/2018    Procedure: SPLANCHNIC NERVE BLOCK;  Surgeon: Ramiro Chinchilla MD;  [facility];  Service: Pain Management     CELIAC PLEXUS BLOCK Bilateral 01/10/2019    Procedure: SPLANCHNIC NERVE BLOCK;  Surgeon: Ramiro Chinchilla MD;  [facility];  Service: Pain Management     CHOLECYSTECTOMY      COLONOSCOPY      ESOPHAGOGASTRODUODENOSCOPY N/A 11/16/2017    Procedure: ESOPHAGOGASTRODUODENOSCOPY (EGD);  Surgeon: Ever Bonner MD;  [facility];  Service: Gastroenterology    ESOPHAGOGASTRODUODENOSCOPY N/A 04/19/2018    Procedure: ESOPHAGOGASTRODUODENOSCOPY (EGD);  Surgeon: Orestes Forrest MD;  [facility];  Service: Gastroenterology    ESOPHAGOGASTRODUODENOSCOPY N/A 05/10/2018     Procedure: ESOPHAGOGASTRODUODENOSCOPY (EGD);  Surgeon: Vaibhav Matthew MD;  [facility];  Service: Gastroenterology    ESOPHAGUS SURGERY  01/17/2025    nubia galindo tear repain    HERNIA REPAIR Bilateral 02/03/2023    Procedure: REPAIR HERNIA INGUINAL, LAPAROSCOPIC,;  Surgeon: Juanjo Travis DO;  [facility];  Service: General    IR TEMPORARY DIALYSIS CATHETER PLACEMENT  02/28/2019    KNEE ARTHROSCOPY Bilateral     KNEE ARTHROSCOPY Right 2009    cibishino last assessed 08/24/2016    KNEE ARTHROSCOPY Right 07/01/2019    LIVER SURGERY      NERVE BLOCK Bilateral 05/29/2018    Procedure: SPLANCHNIC NERVE BLOCK;  Surgeon: Ramiro Chinchilla MD;  [facility];  Service: Pain Management     PANCREAS SURGERY      stents    PANCREATIC CYST EXCISION      NJ INJECTION ANES LMBR/THRC PARAVERTBRL SYMPATHETIC Bilateral 12/28/2017    Procedure: SPLANCHNIC NERVE BLOCK;  Surgeon: Ramiro Chinchilla MD;  [facility];  Service: Pain Management     NJ INJX ANES CELIAC PLEXUS W/WO RADIOLOGIC MONITRNG Bilateral 05/09/2017    Procedure: CELIAC PLEXUS BLOCK ;  Surgeon: Ramiro Chinchilla MD;  [facility];  Service: Pain Management     NJ INJX ANES CELIAC PLEXUS W/WO RADIOLOGIC MONITRNG Bilateral 06/01/2017    Procedure: SPLANCHNIC NERVE BLOCK at T12;  Surgeon: Ramiro Chinchilla MD;  [facility];  Service: Pain Management     NJ INJX ANES CELIAC PLEXUS W/WO RADIOLOGIC MONITRNG Bilateral 08/08/2017    Procedure: BILATERAL SPLANCHNIC NERVE BLOCK T12;  Surgeon: Ramiro Chinchilla MD;  [facility];  Service: Pain Management     NJ INJX ANES CELIAC PLEXUS W/WO RADIOLOGIC MONITRNG Bilateral 04/18/2019    Procedure: BLOCK / INJECTION CELIAC PLEXUS;  Surgeon: Ramiro Chinchilla MD;  [facility];  Service: Pain Management     NJ INJX ANES CELIAC PLEXUS W/WO RADIOLOGIC MONITRNG Bilateral 08/20/2019    Procedure: SPLANCHNIC NERVE BLOCK;  Surgeon: Ramiro Chinchilla MD;  [facility];   Service: Pain Management     MT INJX ANES CELIAC PLEXUS W/WO RADIOLOGIC MONITRNG Bilateral 10/17/2019    Procedure: SPLANCHNIC NERVE BLOCK;  Surgeon: Ramiro Chinchilla MD;  [facility];  Service: Pain Management     MT LAPAROSCOPY SURG CHOLECYSTECTOMY N/A 2017    Procedure: LAPAROSCOPIC CHOLECYSTECTOMY, IOC, POSSIBLE OPEN.;  Surgeon: Suresh Lang MD;  [facility];  Service: General    MT SURGICAL ARTHROSCOPY SHOULDER W/ROTATOR CUFF RPR Left 2023    Procedure: Left Shoulder Arthroscopic Rotator Cuff Repair, Open Subpectoral Biceps Tenodesis, Acromioplasty, Extensive Debridement;  Surgeon: Toro Healy DO;  [facility];  Service: Orthopedics    ROTATOR CUFF REPAIR Right     SHOULDER ARTHROSCOPY      SHOULDER ARTHROSCOPY Right     SHOULDER SURGERY      TENDON REPAIR  2015    Right shoulder   [3]   Social History  Tobacco Use    Smoking status: Former     Current packs/day: 0.00     Average packs/day: 1 pack/day for 20.0 years (20.0 ttl pk-yrs)     Types: Cigarettes     Start date: 3/15/2001     Quit date: 3/15/2021     Years since quittin.2    Smokeless tobacco: Never   Vaping Use    Vaping status: Some Days    Substances: Nicotine   Substance and Sexual Activity    Alcohol use: Not Currently     Comment: Pt relapsed one day after D/c from Detox in May 2025. Drinks 2-3 Fifth bottles of Vodka, and 2-3 6-packs of 12 oz beer. Last drink 25 around 7pm    Drug use: No    Sexual activity: Yes     Partners: Female     Birth control/protection: None

## 2025-06-23 NOTE — ASSESSMENT & PLAN NOTE
History of chronic pancreatitis, cirrhosis of liver, hepatic encephalopathy, esophageal varices, and thoracic vertebral fracture transferred here for alcohol detox  Management per toxicology recommendations with SEWS protocol

## 2025-06-23 NOTE — DISCHARGE INSTR - OTHER ORDERS
Kelli Felder   Certified     [facility] [place] [facility]  [phone]  Monday-Friday 6:45am-3:15pm    AA meeting guide   [email or web address]    AA Phone apps:   Meeting Guide  Everything AA  In the Rooms    AA/24 hour hotline   [phone]    AALV   [email or web address]  [phone]    SMART Recovery Meetings    Self Management and Recovery Training (SMART)  SMART Recovery is an evidenced-informed recovery method grounded in Rational Emotive Behavioral Therapy (REBT) and Cognitive Behavioral Therapy (CBT), that supports people with substance dependencies or problem behaviors to:  Build and maintain motivation  [place] with urges and cravings  Manage thoughts, feelings and behaviors  Live a balanced life    Find meetings and tools: https://smartrecovery.org/  Outpatient Drug & Alcohol Treatment   The [facility] currently operates an outpatient treatment unit:  [facility] in [place], [place] as a functional unit of the [facility]  The Outpatient treatment units are licensed by the [place] Department of Drug & Alcohol Programs to provide individual and group counseling for those with substance abuse and dependency problems. The clinical staff is experienced in a variety of therapeutic modalities and provides treatment that is individualized to meet the particular needs of each person. These units are drug-free treatment programs.  The [facility] accepts most major healthcare insurance coverage plans, [place] Medical Assistance and in those cases where the consumer has no third party healthcare coverage, a liberal sliding fee schedule is utilized. The length of service and type of outpatient service provided is based on the results of the Level of Care Assessment            There are currently three treatment protocols available:  Outpatient  Intensive Outpatient  Contracted services for Partial Hospitalization  Therapy is provided in  both Individual and Group counseling formats. The Outpatient department offers individual counseling for the family members of substance abusers to address co-dependent and enabling behaviors.    Outpatient treatment services in [facility] are purchased through a fee-for-service subcontract with:  [facility]  [address] [place] [address]   Phone: [phone]    [facility]  [address]  [address]  [place] [address]  Phone: [phone]  Fax: [phone]     Outpatient treatment services in [facility] are purchased through fee-for-service subcontracts with:  [facility]  [address] [address]  Phone: [phone]  [facility]  [address]  [address]  [place] [address]  Phone: [phone]  Fax: [phone]   Outpatient treatment services are also available to [facility] residents in our [facility] Office.    24/7 Mental Health Crisis Hotline  [facility]  [phone]    [facility] Toll Free: [phone]

## 2025-06-23 NOTE — SOCIAL WORK
"       06/24/25 1518   Referral Data   Referral Name Pt initially presented to  [facility]   Referral Reason Drug/Alcohol Abuse   County Information   County of Residence [place]   Readmission Root Cause   30 Day Readmission No   Patient Information   Mental Status Alert   Primary Caregiver Self   Accompanied by/Relationship n/a   Support System Immediate family   Mandaen/Cultural Requests [facility], no requests   Legal Information   Legal Issues Pt has pending charges of simple assault, harrassment, strangulation, and unlawful restraint/serious bodily injury.   Health Care Proxy Appointed No   Activities of Daily Living Prior to Admission   Functional Status Independent   Assistive Device   (wears back brace)   Living Arrangement Lives with someone;House   Ambulation Independent   Access to Firearms   Access to Firearms No  (Pt states, \"My wife hid them.\")   Income Information   Income Source Pension/detention  (Pt is not working, he has a pension)        06/24/25 1531   Substance Abuse Addendum Details   History of Withdrawal Symptoms Other withdrawal symptoms (specify in comment);DT's  (Tremors, anxiety, back pain, irritability)   Medical Complications Alcohol use disorder, severe, dependence (HCC)  Hypertriglyceridemia  Chronic pancreatitis (HCC)  Chronic gastritis without bleeding  Hypertension  Controlled type 2 diabetes mellitus with hyperglycemia, with long-term current use of insulin (HCC)  Cirrhosis of liver without ascites, unspecified hepatic cirrhosis type (HCC)  Alcohol withdrawal (HCC)  Thoracic compression fracture (HCC)   Sober Supports wife is in early recovery   Present Treatment PCP, AA home group, therapist   Substance Abuse Treatment Hx Past Tx, Outpatient;Attends AA/NA;Past detox   ASAM Level & Criteria 4.0 pt reports of withdrawal seizures and DTs; current w/d sxs of Tremors, anxiety, back pain, irritability; medical dxes of Alcohol use disorder, severe, dependence (HCC)  " Hypertriglyceridemia  Chronic pancreatitis (HCC)  Chronic gastritis without bleeding  Hypertension  Controlled type 2 diabetes mellitus with hyperglycemia, with long-term current use of insulin (HCC)  Cirrhosis of liver without ascites, unspecified hepatic cirrhosis type (HCC)  Alcohol withdrawal (HCC)  Thoracic compression fracture (HCC); pt has limited natural sober supports in the community; active legal problems; is currently enrolled in OP therapy and attending AA and drinking a large amt of alcohol daily; could benefit from HLOC at this time   Stage of Change   Stage of Change Contemplation     Pt is a 49 yo male admitted to [facility] for alcohol withdrawal. Pt initially presented to [facility] ED. Pt's name, date of birth, home address and telephone number were verified. Pt was informed of case management role and the purpose of the completion of intake with case management. Pt was cooperative.     SUBSTANCE ABUSE    Stressor/Trigger    Alcohol withdrawal; Pt reported he relapsed one day after D/c from Detox in May 2025    UDS: not completed  Audit: not completed  PAWSS: 5 Nicotine: vaping some days, former cigarette smoker  Ethanol: pt refused, then this was  later completed and was 446   Prior D&A treatment   No IP AUD treatment in past    Detox 5/2025   AA/NA Meetings   Pt stated he attends AA mtgs and has an OP therapist   Withdrawal  Symptoms   Tremors, anxiety, back pain, irritability   History of OD/blackouts or Withdrawal Seizures   Pt denies history of w/d seizures, but states he had DTs in the past   MENTAL HEALTH INFORMATION    Hx of Mental Health Dx   Pt denies   Outpatient Mental Health Tx   Pt denies    Previous notes per chart review indicate SI in the past   Inpatient Hospitalizations (Mental Health)   Pt denies   Family History of Mental Health    Mother, grandmother, father, uncle-alcohol use    Trauma History    Recent death of friend, recent hospitalization of friend   Current MH Symptoms   Pt  "endorses anxiety, denies SI/HI/AVH, other MH sxs, can contract for safety.   Access to Firearms    Pt stated \"my wife his them.\"     PHYSICAL HEALTH INFORMATION    Physical Health Conditions (Chronic)   Alcohol use disorder, severe, dependence (HCC)   Hypertriglyceridemia   Chronic pancreatitis (HCC)   Chronic gastritis without bleeding   Hypertension   Controlled type 2 diabetes mellitus with hyperglycemia, with long-term current use of insulin (HCC)   Cirrhosis of liver without ascites, unspecified hepatic cirrhosis type (HCC)   Alcohol withdrawal (HCC)   Thoracic compression fracture (HCC)      PCP   LUANA Mejía  [phone]    Insurance   [facility] PFSweb BC PLAN 280 (primary)    [employer] (secondary)  [phone]      Preferred Pharmacy   [id number] - [place], [place] - [address] [place] [address]   [address] [place] [address]   Phone: [phone] Fax: [phone]      LEGAL ISSUES    Past or present legal issues   Pt endorsed current legal issues, did not provide details. Per the [facility] [employer] [place] online portal, Pt has pending charges of simple assault, harrassment, strangulation, and unlawful restraint/serious bodily injury.    Probation/[place]      EMPLOYMENT/INCOME/NEEDS    Education   HS Diploma    Employment Pt is not currently working. He indicated he is receiving a pension.       History   denies   Spiritual/Restorationism Beliefs   Yazidism   Transportation/Transport Home   Pt has transportation to [facility]   DEMOGRAPHICS    Discharge Address   [address] [place] [address]    ([facility])   Living Arrangement   Lives with wife   Can pt return home Pt is requesting referral to IP AUD treatment. Wife is in agreement.      External Supports     Geraldine Wright (Spouse)   [address] [place] [address] [phone] (M)     Pt reports his significant other has been sober for 33 days   Phone Number   [phone] " "(M)   [phone] (H)    Email   [email or web address]    Marital Status/Children   , two adult children from previous marriage      Substance First use Last Use Frequency Amount Used How long Longest period of sobriety and when Method of use   THC            Heroin            Cocaine            ETOH   7 or 8 6/22/25 daily 2 bottles vodka and 15 beers 2 years   5 years around last 90s or early 2000s, right before he got  the first time  drink   Meth            Benzos            Other:                Pt and CM completed recovery plan. He stated his goals were to be sober and \"get myself better.\" He indicated his wife was a support, that she was in early recovery and had recently completed IP tx. He also stated he had been 5 years sober in the past. He was unable to say what his triggers are for drinking only that he has \" a long line of alcoholics in my family.\"He was unsure of any relapse prevention strategies that were helpful in his recovery. Pt's goals for detox are to successfully complete medical withdrawal and to develop a discharge plan that includes relapse prevention education. Pt signed full CAITLIN for Geraldine Wright (wife/emergency contact), PCP Dr Phillips, [facility], [facility] (insurance), [facility] (IP referral). Pt is in the contemplation stage of change.     Social Determinants     06/25/25 4487   Financial Resource Strain   How hard is it for you to pay for the very basics like food, housing, medical care, and heating? Not hard   Housing Stability   In the last 12 months, was there a time when you were not able to pay the mortgage or rent on time? N   In the past 12 months, how many times have you moved where you were living? 1   At any time in the past 12 months, were you homeless or living in a shelter (including now)? N   Transportation Needs   In the past 12 months, has lack of transportation kept you from medical appointments or from getting medications? no   In the past 12 months, " has lack of transportation kept you from meetings, work, or from getting things needed for daily living? No   Food Insecurity   Within the past 12 months, you worried that your food would run out before you got the money to buy more. Never true   Within the past 12 months, the food you bought just didn't last and you didn't have money to get more. Never true   Social Connections   In a typical week, how many times do you talk on the phone with family, friends, or neighbors? More than 3   How often do you get together with friends or relatives? More than 3   How often do you attend Baptism or Anglican services? More than 4   Do you belong to any clubs or organizations such as Baptism groups, unions, fraternal or athletic groups, or school groups? Yes   How often do you attend meetings of the clubs or organizations you belong to? More than 4   Are you , , , , never , or living with a partner?    Intimate Partner Violence   Within the last year, have you been afraid of your partner or ex-partner? No   Within the last year, have you been humiliated or emotionally abused in other ways by your partner or ex-partner? No   Within the last year, have you been kicked, hit, slapped, or otherwise physically hurt by your partner or ex-partner? No   Within the last year, have you been raped or forced to have any kind of sexual activity by your partner or ex-partner? No   Alcohol Use   Q1: How often do you have a drink containing alcohol? 4 or more ti   Q2: How many drinks containing alcohol do you have on a typical day when you are drinking? 10 or more   Q3: How often do you have six or more drinks on one occasion? Daily   Utilities   In the past 12 months has the electric, gas, oil, or water company threatened to shut off services in your home? No   Health Literacy   How often do you need to have someone help you when you read instructions, pamphlets, or other written material from  your doctor or pharmacy? Never   Follow-Ups   We make community resources available to all of our patients to assist with everyday needs. We may be able to connect you with those resources. Would you be interested? Y   Would you be interested in assistance with any of these areas? If so, which ones? 2

## 2025-06-23 NOTE — ED NOTES
Pt refused repeat lactic blood draw and refused to take vs at present. Pt proceed to rest comfortably in the stretcher     Venecia Bridges RN  06/23/25 0027

## 2025-06-23 NOTE — EMTALA/ACUTE CARE TRANSFER
[facility] EMERGENCY DEPARTMENT  [address]  [place] [address]  Dept: [phone]      EMTALA TRANSFER CONSENT    NAME Ashwin Wright                                         1975                              MRN [id number]    I have been informed of my rights regarding examination, treatment, and transfer   by Dr. Krystle Joe DO    Benefits: Specialized equipment and/or services available at the receiving facility (Include comment)________________________, Continuity of care    Risks: Increased discomfort during transfer, Potential for delay in receiving treatment, Potential deterioration of medical condition, Loss of IV, Possible worsening of condition or death during transfer      Consent for Transfer:  I acknowledge that my medical condition has been evaluated and explained to me by the emergency department physician or other qualified medical person and/or my attending physician, who has recommended that I be transferred to the service of  Accepting Physician: Dr Joseph at Accepting Facility Name, City & State : [facility]. The above potential benefits of such transfer, the potential risks associated with such transfer, and the probable risks of not being transferred have been explained to me, and I fully understand them.  The doctor has explained that, in my case, the benefits of transfer outweigh the risks.  I agree to be transferred.    I authorize the performance of emergency medical procedures and treatments upon me in both transit and upon arrival at the receiving facility.  Additionally, I authorize the release of any and all medical records to the receiving facility and request they be transported with me, if possible.  I understand that the safest mode of transportation during a medical emergency is an ambulance and that the Hospital advocates the use of this mode of transport. Risks of traveling to the receiving facility by car, including absence of medical  control, life sustaining equipment, such as oxygen, and medical personnel has been explained to me and I fully understand them.    (MARIANNE CORRECT BOX BELOW)  [  ]  I consent to the stated transfer and to be transported by ambulance/helicopter.  [  ]  I consent to the stated transfer, but refuse transportation by ambulance and accept full responsibility for my transportation by car.  I understand the risks of non-ambulance transfers and I exonerate the Hospital and its staff from any deterioration in my condition that results from this refusal.    X___________________________________________    DATE  25  TIME________  Signature of patient or legally responsible individual signing on patient behalf           RELATIONSHIP TO PATIENT_________________________          Provider Certification    NAME Ashwin Wright                                        [facility] 1975                              MRN [id number]    A medical screening exam was performed on the above named patient.  Based on the examination:    Condition Necessitating Transfer The primary encounter diagnosis was Alcohol intoxication (HCC). A diagnosis of Alcohol abuse was also pertinent to this visit.    Patient Condition: The patient has been stabilized such that within reasonable medical probability, no material deterioration of the patient condition or the condition of the unborn child(deepika) is likely to result from the transfer    Reason for Transfer: Level of Care needed not available at this facility    Transfer Requirements: Facility [facility]   Space available and qualified personnel available for treatment as acknowledged by    Agreed to accept transfer and to provide appropriate medical treatment as acknowledged by       Dr Joseph  Appropriate medical records of the examination and treatment of the patient are provided at the time of transfer   STAFF INITIAL WHEN COMPLETED _______  Transfer will be performed by qualified personnel from    and  appropriate transfer equipment as required, including the use of necessary and appropriate life support measures.    Provider Certification: I have examined the patient and explained the following risks and benefits of being transferred/refusing transfer to the patient/family:  General risk, such as traffic hazards, adverse weather conditions, rough terrain or turbulence, possible failure of equipment (including vehicle or aircraft), or consequences of actions of persons outside the control of the transport personnel, Unanticipated needs of medical equipment and personnel during transport, Risk of worsening condition, The possibility of a transport vehicle being unavailable      Based on these reasonable risks and benefits to the patient and/or the unborn child(deepika), and based upon the information available at the time of the patient’s examination, I certify that the medical benefits reasonably to be expected from the provision of appropriate medical treatments at another medical facility outweigh the increasing risks, if any, to the individual’s medical condition, and in the case of labor to the unborn child, from effecting the transfer.    X____________________________________________ DATE 06/22/25        TIME_______      ORIGINAL - SEND TO MEDICAL RECORDS   COPY - SEND WITH PATIENT DURING TRANSFER

## 2025-06-23 NOTE — ASSESSMENT & PLAN NOTE
Lab Results   Component Value Date    HGBA1C 5.6 05/12/2025     Recent Labs     06/22/25 2028 06/23/25 0623 06/23/25  1117   POCGLU 120 180* 118     Prior to admission on Jardiance.  Placed on sliding scale insulin during hospitalization

## 2025-06-23 NOTE — ED CARE HANDOFF
Emergency Department Sign Out Note        Sign out and transfer of care from Dr. Joe. See Separate Emergency Department note.     The patient, Ashwin Wright, was evaluated by the previous provider for alcohol abuse.    Workup Completed:  Labs completed - lactic acidosis, hypomagnesemia,     ED Course / Workup Pending (followup):  Awaiting transfer to  for Detox prior to rehab        No data recorded                             Procedures  Medical Decision Making  Amount and/or Complexity of Data Reviewed  Labs: ordered.    Risk  OTC drugs.  Prescription drug management.            Disposition  Final diagnoses:   Alcohol intoxication (HCC)   Alcohol abuse     Time reflects when diagnosis was documented in both MDM as applicable and the Disposition within this note       Time User Action Codes Description Comment    6/22/2025 10:13 PM Krystle Joe Add [F10.929] Alcohol intoxication (HCC)     6/22/2025 10:13 PM Krystle Joe Add [F10.10] Alcohol abuse           ED Disposition       ED Disposition   Transfer to Another Facility-In Network    Condition   --    Date/Time   Sun Jun 22, 2025 10:14 PM    Comment   Ashwin Wright should be transferred out to .               MD Documentation      Flowsheet Row Most Recent Value   Patient Condition The patient has been stabilized such that within reasonable medical probability, no material deterioration of the patient condition or the condition of the unborn child(deepika) is likely to result from the transfer   Reason for Transfer Level of Care needed not available at this facility   Benefits of Transfer Specialized equipment and/or services available at the receiving facility (Include comment)________________________, Continuity of care   Risks of Transfer Increased discomfort during transfer, Potential for delay in receiving treatment, Potential deterioration of medical condition, Loss of IV, Possible worsening of condition or death during transfer   Accepting  Physician Dr Joseph   Accepting Facility Name, [facility]   Sending MD Joe   Provider Certification General risk, such as traffic hazards, adverse weather conditions, rough terrain or turbulence, possible failure of equipment (including vehicle or aircraft), or consequences of actions of persons outside the control of the transport personnel, Unanticipated needs of medical equipment and personnel during transport, Risk of worsening condition, The possibility of a transport vehicle being unavailable          RN Documentation      Flowsheet Row Most Recent Value   Accepting Facility Name, [facility]          Follow-up Information    None       Patient's Medications   Discharge Prescriptions    No medications on file     No discharge procedures on file.       ED Provider  Electronically Signed by     Maggi Wing MD  06/23/25 0045

## 2025-06-23 NOTE — ASSESSMENT & PLAN NOTE
Longstanding history of alcohol use past couple years, currently drinking approximately 2 bottles of vodka and 15 beers daily, last drink at approximately 2100 on 06/22  (-)history of withdrawal seizures  (-) history of ICU admissions  (+) history of admission to detox unit   (-) history of inpatient/outpatient rehab  (+) history of naltrexone/acamprosate/disulfiram  He is interested in IM naltrexone  Multivitamin, thiamine, folic acid  Encourage cessation  Appreciate case management recommendations in regards to disposition planning -- wants inpatient resources

## 2025-06-23 NOTE — CONSULTS
Consultation - Medical Toxicology   Name: Ashwin Wright 50 y.o. male I MRN: [id number]  Unit/Bed#: 5T DETOX 512-01 I Date of Admission: 6/23/2025   Date of Service: 6/23/2025 I Hospital Day: 1   Inpatient consult to Toxicology  Consult performed by: Fito Holt DO  Consult ordered by: Leodan Pryor PA-C        Physician Requesting Evaluation: Kirill Joseph DO   Reason for Evaluation / Principal Problem: Alcohol withdrawal    Assessment & Plan  Alcohol use disorder, severe, dependence (HCC)  Longstanding history of alcohol use past couple years, currently drinking approximately 2 bottles of vodka and 15 beers daily, last drink at approximately 2100 on 06/22  (-)history of withdrawal seizures  (-) history of ICU admissions  (+) history of admission to detox unit   (-) history of inpatient/outpatient rehab  (+) history of naltrexone/acamprosate/disulfiram  He is interested in IM naltrexone  Multivitamin, thiamine, folic acid  Encourage cessation  Appreciate case management recommendations in regards to disposition planning -- wants inpatient resources   Alcohol withdrawal (HCC)     Recent Labs     06/22/25  2132   ETOH 446*     Start symptom triggered phenobarbital administration via protocol   On the unit, received 10 mg IV diazepam and loading dose of 650 mg IV phenobarbital.  Total received: 910 mg phenobarbital 06/23/25 9:29 AM   Current withdrawal symptoms: Anxiety and tremors  Supportive care including IV fluids, antiemetics, nonopioid analgesics, antidiarrheals as needed  Cardiac monitoring and telemetry  Seizure precautions   Medications reviewed. Continue current medications at prescribed doses.    Please see additional teaching note below (if available):    For further questions, please contact the medical  on call via SecureChat between 8am and 9pm. If between 9pm and 8am, please reach out to the [facility] at [phone].     History of Present Illness   Ashwin Wright is a 50 y.o.  year old male who presents with alcohol withdrawal.  Patient was drinking approximately 2 bottles of vodka and 15 beers daily for the past couple of years.  He has a history of detox in the past.  He also claims that he experienced delirium tremens when trying to go through withdrawal at home.  Patient denies any history of rehab.  Patient did take naltrexone for a brief period of time for breaking his back and being put on oral opioids.  Patient has not taken any opioids in the last 2 weeks and would like to go back on naltrexone.  He is interested in Vivitrol at this time.     Review of Systems   Constitutional: Negative.    HENT: Negative.     Eyes: Negative.    Respiratory: Negative.     Cardiovascular: Negative.    Gastrointestinal: Negative.    Endocrine: Negative.    Genitourinary: Negative.    Musculoskeletal: Negative.    Allergic/Immunologic: Negative.    Neurological:  Positive for tremors.   Psychiatric/Behavioral:  The patient is nervous/anxious.        Historical Information   Medical History Review: I have reviewed the patient's PMH, PSH, Social History, Family History, Meds, and Allergies   Historical Information   Past Medical History[1]  Past Surgical History[2]  Social History[3]  E-Cigarette/Vaping    E-Cigarette Use Current Some Day User      E-Cigarette/Vaping Substances    Nicotine Yes     THC No     CBD No     Flavoring No     Other No     Unknown No      Family history non-contributory  Social History[4]    Current Facility-Administered Medications:     albuterol (PROVENTIL HFA,VENTOLIN HFA) inhaler 2 puff, Q6H PRN    atorvastatin (LIPITOR) tablet 80 mg, Daily With Dinner    busPIRone (BUSPAR) tablet 5 mg, BID    carvedilol (COREG) tablet 6.25 mg, BID With Meals    dextrose 5 % and sodium chloride 0.9 % infusion, Continuous, Last Rate: 125 mL/hr (06/23/25 5965)    famotidine (PEPCID) tablet 40 mg, HS PRN    fenofibrate (TRICOR) tablet 145 mg, Daily    FLUoxetine (PROzac) capsule 10 mg,  Daily    folic acid (FOLVITE) tablet 1 mg, Daily    hydrOXYzine HCL (ATARAX) tablet 25 mg, Once    insulin lispro (HumALOG/ADMELOG) 100 units/mL subcutaneous injection 1-6 Units, TID AC **AND** Fingerstick Glucose (POCT), TID AC    insulin lispro (HumALOG/ADMELOG) 100 units/mL subcutaneous injection 1-6 Units, HS    lactulose (CHRONULAC) oral solution 30 g, TID    multivitamin-minerals (CENTRUM) tablet 1 tablet, Daily    ondansetron (ZOFRAN) injection 4 mg, Q6H PRN    pancrelipase (Lip-Prot-Amyl) (CREON) delayed release capsule 12,000 Units, BID    pancrelipase (Lip-Prot-Amyl) (CREON) delayed release capsule 36,000 Units, TID AC    pantoprazole (PROTONIX) EC tablet 40 mg, Early Morning    rifaximin (XIFAXAN) tablet 550 mg, Q12H CARRIE    thiamine tablet 100 mg, Daily    traZODone (DESYREL) tablet 50 mg, HS PRN  Prior to Admission Medications   Prescriptions Last Dose Informant Patient Reported? Taking?   Choline Fenofibrate (Fenofibric Acid) 135 MG CPDR 6/22/2025 Self, Spouse/Significant Other No Yes   Sig: TAKE ONE CAPSULE BY MOUTH DAILY   Empagliflozin 10 MG TABS 6/22/2025 Self, Spouse/Significant Other Yes Yes   Sig: Take 10 mg by mouth every morning   FLUoxetine 10 MG tablet 6/22/2025 Morning  No Yes   Sig: Take 1 tablet (10 mg total) by mouth daily   Insulin Pen Needle (Pen Needles) 32G X 5 MM MISC 6/22/2025 Self, Spouse/Significant Other Yes Yes   Multiple Vitamins-Minerals (MULTIVITAMIN ADULTS PO) 6/22/2025 Self, Spouse/Significant Other Yes Yes   Sig: Take by mouth daily after breakfast   Pancrelipase, Lip-Prot-Amyl, (Creon) 32317-181715 units CPEP 6/22/2025 Evening Self, Spouse/Significant Other No Yes   Sig: TAKE 1 CAPSULE (36,000 UNITS TOTAL) BY MOUTH 3 (THREE) TIMES A DAY BEFORE MEALS   Thiamine HCl (vitamin B-1) 250 MG tablet  Self, Spouse/Significant Other Yes No   Sig: Take 250 mg by mouth daily   albuterol (PROVENTIL HFA,VENTOLIN HFA) 90 mcg/act inhaler Past Month Self, Spouse/Significant Other No Yes    Sig: Inhale 2 puffs every 6 (six) hours as needed for wheezing   carvedilol (COREG) 3.125 mg tablet 6/21/2025 Self, Spouse/Significant Other No Yes   Sig: Take 2 tablets (6.25 mg total) by mouth 2 (two) times a day with meals   esomeprazole (NexIUM) 40 MG capsule 6/22/2025 Morning Self, Spouse/Significant Other No Yes   Sig: Take 1 capsule (40 mg total) by mouth if needed (heartburn)   famotidine (PEPCID) 40 MG tablet 6/22/2025 Bedtime Self, Spouse/Significant Other Yes Yes   Sig: Take 40 mg by mouth daily at bedtime as needed   folic acid (FOLVITE) 1 mg tablet 6/22/2025 Morning  No Yes   Sig: Take 1 tablet (1 mg total) by mouth daily   lactulose (CHRONULAC) 10 g/15 mL solution 6/22/2025 Evening  No Yes   Sig: Take 45 mL (30 g total) by mouth 3 (three) times a day   rifaximin (XIFAXAN) 550 mg tablet 6/22/2025 Morning  No Yes   Sig: Take 1 tablet (550 mg total) by mouth every 12 (twelve) hours   rosuvastatin (CRESTOR) 40 MG tablet 6/22/2025 Bedtime Self, Spouse/Significant Other No Yes   Sig: TAKE ONE TABLET BY MOUTH EVERY DAY      Facility-Administered Medications: None     Bee venom and Percocet [oxycodone-acetaminophen]  Social History[5]  Family History[6]    Meds/Allergies   Prior to Admission medications    Medication Sig Start Date End Date Taking? Authorizing Provider   albuterol (PROVENTIL HFA,VENTOLIN HFA) 90 mcg/act inhaler Inhale 2 puffs every 6 (six) hours as needed for wheezing 4/21/25  Yes LUANA Mejía   carvedilol (COREG) 3.125 mg tablet Take 2 tablets (6.25 mg total) by mouth 2 (two) times a day with meals 2/10/25 8/9/25 Yes Rajiv Burgos III, MD   Choline Fenofibrate (Fenofibric Acid) 135 MG CPDR TAKE ONE CAPSULE BY MOUTH DAILY 3/27/25  Yes LUANA Mejía   Empagliflozin 10 MG TABS Take 10 mg by mouth every morning   Yes Historical Provider, MD   esomeprazole (NexIUM) 40 MG capsule Take 1 capsule (40 mg total) by mouth if needed (heartburn) 2/10/25  Yes Rajiv Burgos III, MD    famotidine (PEPCID) 40 MG tablet Take 40 mg by mouth daily at bedtime as needed   Yes Historical Provider, MD   FLUoxetine 10 MG tablet Take 1 tablet (10 mg total) by mouth daily 6/12/25  Yes LUANA Mejía   folic acid (FOLVITE) 1 mg tablet Take 1 tablet (1 mg total) by mouth daily 5/18/25  Yes Eli Cortez PA-C   Insulin Pen Needle (Pen Needles) 32G X 5 MM MISC  4/29/21  Yes Historical Provider, MD   lactulose (CHRONULAC) 10 g/15 mL solution Take 45 mL (30 g total) by mouth 3 (three) times a day 6/11/25  Yes Dinora Mensah PA-C   Multiple Vitamins-Minerals (MULTIVITAMIN ADULTS PO) Take by mouth daily after breakfast   Yes Historical Provider, MD   Pancrelipase, Lip-Prot-Amyl, (Creon) 10964-288210 units CPEP TAKE 1 CAPSULE (36,000 UNITS TOTAL) BY MOUTH 3 (THREE) TIMES A DAY BEFORE MEALS 9/10/24  Yes Dinora Mensah PA-C   rifaximin (XIFAXAN) 550 mg tablet Take 1 tablet (550 mg total) by mouth every 12 (twelve) hours 6/11/25 9/9/25 Yes Dinora Mensah PA-C   rosuvastatin (CRESTOR) 40 MG tablet TAKE ONE TABLET BY MOUTH EVERY DAY 7/5/24  Yes LUANA Mejía   Thiamine HCl (vitamin B-1) 250 MG tablet Take 250 mg by mouth daily 1/22/25 2/21/25  Historical Provider, MD   Current Medications[7]   Allergies[8]    Objective :  Temp:  [97 °F (36.1 °C)-98 °F (36.7 °C)] 97.5 °F (36.4 °C)  HR:  [] 96  BP: (112-143)/(64-89) 138/76  Resp:  [17-20] 18  SpO2:  [91 %-99 %] 97 %  O2 Device: None (Room air)      Intake/Output Summary (Last 24 hours) at 6/23/2025 0904  Last data filed at 6/23/2025 0644  Gross per 24 hour   Intake 820 ml   Output --   Net 820 ml       Physical Exam  Constitutional:       Appearance: Normal appearance.      Comments: Wearing back brace     Eyes:      Extraocular Movements: Extraocular movements intact.      Conjunctiva/sclera: Conjunctivae normal.      Pupils: Pupils are equal, round, and reactive to light.       Cardiovascular:      Rate and Rhythm: Normal rate and regular rhythm.       Pulses: Normal pulses.      Heart sounds: Normal heart sounds.   Pulmonary:      Effort: Pulmonary effort is normal.      Breath sounds: Normal breath sounds.   Abdominal:      General: There is no distension.      Tenderness: There is no abdominal tenderness. There is no guarding.     Neurological:      General: No focal deficit present.      Mental Status: He is alert and oriented to person, place, and time.      Cranial Nerves: No cranial nerve deficit.      Sensory: No sensory deficit.      Motor: No weakness or tremor.      Coordination: Finger-Nose-Finger Test normal.           Lab Results: I have reviewed the following results:  Results from last 7 days   Lab Units 06/22/25  2132   WBC Thousand/uL 6.57   HEMOGLOBIN g/dL 11.2*   HEMATOCRIT % 37.0   PLATELETS Thousands/uL 181   SEGS PCT % 53   LYMPHO PCT % 36   MONO PCT % 9   EOS PCT % 1      Results from last 7 days   Lab Units 06/22/25  2132   POTASSIUM mmol/L 3.4*   CHLORIDE mmol/L 110*   CO2 mmol/L 25   BUN mg/dL 5   CREATININE mg/dL 0.44*   CALCIUM mg/dL 8.7   ALBUMIN g/dL 4.0   ALK PHOS U/L 86   ALT U/L 16   AST U/L 39   MAGNESIUM mg/dL 1.6*          Results from last 7 days   Lab Units 06/22/25  2132   LACTIC ACID mmol/L 3.1*              Results from last 7 days   Lab Units 06/22/25  2132   ETHANOL LVL mg/dL 446*     Imaging Results Review: No pertinent imaging studies reviewed.  Other Study Results Review: EKG was reviewed.     Administrative Statements   I have spent a total time of 15 minutes in caring for this patient on the day of the visit/encounter including Diagnostic results, Prognosis, Risks and benefits of tx options, Instructions for management, Patient and family education, and Importance of tx compliance.         [1]   Past Medical History:  Diagnosis Date    Alcoholism (HCC)     Arthritis     Asthma     Chronic pain disorder     Chronic pancreatitis (HCC)     Cirrhosis (HCC)     early cirrhosis    GERD (gastroesophageal reflux disease)      History of COVID-19 01/2022    mild s/s    Hyperlipidemia     Hypertension     Liver abscess     Liver disease     Meniscus tear     left knee work injury last assessed 08/24/2016    Pancreatitis     Pneumonia     Rotator cuff tear     Stomach disorder     Chronic gastritus   [2]   Past Surgical History:  Procedure Laterality Date    CELIAC PLEXUS BLOCK Bilateral 10/29/2018    Procedure: SPLANCHNIC NERVE BLOCK;  Surgeon: Ramiro Chinchilla MD;  [facility];  Service: Pain Management     CELIAC PLEXUS BLOCK Bilateral 01/10/2019    Procedure: SPLANCHNIC NERVE BLOCK;  Surgeon: Ramiro Chinchilla MD;  [facility];  Service: Pain Management     CHOLECYSTECTOMY      COLONOSCOPY      ESOPHAGOGASTRODUODENOSCOPY N/A 11/16/2017    Procedure: ESOPHAGOGASTRODUODENOSCOPY (EGD);  Surgeon: Ever Bonner MD;  Location: [facility];  Service: Gastroenterology    ESOPHAGOGASTRODUODENOSCOPY N/A 04/19/2018    Procedure: ESOPHAGOGASTRODUODENOSCOPY (EGD);  Surgeon: Orestes Forrest MD;  [facility];  Service: Gastroenterology    ESOPHAGOGASTRODUODENOSCOPY N/A 05/10/2018    Procedure: ESOPHAGOGASTRODUODENOSCOPY (EGD);  Surgeon: Vaibhav Matthew MD;  [facility];  Service: Gastroenterology    ESOPHAGUS SURGERY  01/17/2025    nubia galindo tear repain    HERNIA REPAIR Bilateral 02/03/2023    Procedure: REPAIR HERNIA INGUINAL, LAPAROSCOPIC,;  Surgeon: Juanjo Travis DO;  [facility];  Service: General    IR TEMPORARY DIALYSIS CATHETER PLACEMENT  02/28/2019    KNEE ARTHROSCOPY Bilateral     KNEE ARTHROSCOPY Right 2009    cibishino last assessed 08/24/2016    KNEE ARTHROSCOPY Right 07/01/2019    LIVER SURGERY      NERVE BLOCK Bilateral 05/29/2018    Procedure: SPLANCHNIC NERVE BLOCK;  Surgeon: Ramiro Chinchilla MD;  [facility];  Service: Pain Management     PANCREAS SURGERY      stents    PANCREATIC CYST EXCISION      AL INJECTION ANES LMBR/THRC PARAVERTBRL SYMPATHETIC Bilateral 12/28/2017     Procedure: SPLANCHNIC NERVE BLOCK;  Surgeon: Ramiro Chinchilla MD;  [facility];  Service: Pain Management     LA INJX ANES CELIAC PLEXUS W/WO RADIOLOGIC MONITRNG Bilateral 05/09/2017    Procedure: CELIAC PLEXUS BLOCK ;  Surgeon: Ramiro Chinchilla MD;  [facility];  Service: Pain Management     LA INJX ANES CELIAC PLEXUS W/WO RADIOLOGIC MONITRNG Bilateral 06/01/2017    Procedure: SPLANCHNIC NERVE BLOCK at T12;  Surgeon: Ramiro Chinchilla MD;  [facility];  Service: Pain Management     LA INJX ANES CELIAC PLEXUS W/WO RADIOLOGIC MONITRNG Bilateral 08/08/2017    Procedure: BILATERAL SPLANCHNIC NERVE BLOCK T12;  Surgeon: Ramiro Chinchilla MD;  [facility];  Service: Pain Management     LA INJX ANES CELIAC PLEXUS W/WO RADIOLOGIC MONITRNG Bilateral 04/18/2019    Procedure: BLOCK / INJECTION CELIAC PLEXUS;  Surgeon: Ramiro Chinchilla MD;  [facility];  Service: Pain Management     LA INJX ANES CELIAC PLEXUS W/WO RADIOLOGIC MONITRNG Bilateral 08/20/2019    Procedure: SPLANCHNIC NERVE BLOCK;  Surgeon: Ramiro Chinchilla MD;  [facility];  Service: Pain Management     LA INJX ANES CELIAC PLEXUS W/WO RADIOLOGIC MONITRNG Bilateral 10/17/2019    Procedure: SPLANCHNIC NERVE BLOCK;  Surgeon: Ramiro Chinchilla MD;  [facility];  Service: Pain Management     LA LAPAROSCOPY SURG CHOLECYSTECTOMY N/A 02/14/2017    Procedure: LAPAROSCOPIC CHOLECYSTECTOMY, IOC, POSSIBLE OPEN.;  Surgeon: Suresh Lang MD;  [facility];  Service: General    LA SURGICAL ARTHROSCOPY SHOULDER W/ROTATOR CUFF RPR Left 06/09/2023    Procedure: Left Shoulder Arthroscopic Rotator Cuff Repair, Open Subpectoral Biceps Tenodesis, Acromioplasty, Extensive Debridement;  Surgeon: Toro Healy DO;  [facility];  Service: Orthopedics    ROTATOR CUFF REPAIR Right     SHOULDER ARTHROSCOPY      SHOULDER ARTHROSCOPY Right     SHOULDER SURGERY      TENDON REPAIR  2015    Right shoulder   [3]   Social  History  Tobacco Use    Smoking status: Former     Current packs/day: 0.00     Average packs/day: 1 pack/day for 20.0 years (20.0 ttl pk-yrs)     Types: Cigarettes     Start date: 3/15/2001     Quit date: 3/15/2021     Years since quittin.2    Smokeless tobacco: Never   Vaping Use    Vaping status: Some Days    Substances: Nicotine   Substance and Sexual Activity    Alcohol use: Not Currently     Comment: Pt relapsed one day after D/c from Detox in May 2025. Drinks 2-3 Fifth bottles of Vodka, and 2-3 6-packs of 12 oz beer. Last drink 25 around 7pm    Drug use: No    Sexual activity: Yes     Partners: Female     Birth control/protection: None   [4]   Social History  Tobacco Use    Smoking status: Former     Current packs/day: 0.00     Average packs/day: 1 pack/day for 20.0 years (20.0 ttl pk-yrs)     Types: Cigarettes     Start date: 3/15/2001     Quit date: 3/15/2021     Years since quittin.2    Smokeless tobacco: Never   Vaping Use    Vaping status: Some Days    Substances: Nicotine   Substance and Sexual Activity    Alcohol use: Not Currently     Comment: Pt relapsed one day after D/c from Detox in May 2025. Drinks 2-3 Fifth bottles of Vodka, and 2-3 6-packs of 12 oz beer. Last drink 25 around 7pm    Drug use: No    Sexual activity: Yes     Partners: Female     Birth control/protection: None   [5]   Social History  Tobacco Use    Smoking status: Former     Current packs/day: 0.00     Average packs/day: 1 pack/day for 20.0 years (20.0 ttl pk-yrs)     Types: Cigarettes     Start date: 3/15/2001     Quit date: 3/15/2021     Years since quittin.2    Smokeless tobacco: Never   Vaping Use    Vaping status: Some Days    Substances: Nicotine   Substance and Sexual Activity    Alcohol use: Not Currently     Comment: Pt relapsed one day after D/c from Detox in May 2025. Drinks 2-3 Fifth bottles of Vodka, and 2-3 6-packs of 12 oz beer. Last drink 25 around 7pm    Drug use: No    Sexual activity:  Yes     Partners: Female     Birth control/protection: None   [6]   Family History  Problem Relation Name Age of Onset    Cirrhosis Mother      Heart disease Other grandfather         cardiac disorder    Cancer Other grandmother    [7]   Current Facility-Administered Medications:     albuterol (PROVENTIL HFA,VENTOLIN HFA) inhaler 2 puff, 2 puff, Inhalation, Q6H PRN, Leodan Pryor PA-C    atorvastatin (LIPITOR) tablet 80 mg, 80 mg, Oral, Daily With Dinner, Leodan Pryor PA-C    busPIRone (BUSPAR) tablet 5 mg, 5 mg, Oral, BID, Leodan Pryor PA-C, 5 mg at 06/23/25 0809    carvedilol (COREG) tablet 6.25 mg, 6.25 mg, Oral, BID With Meals, Leodan Pryor PA-C, 6.25 mg at 06/23/25 0809    dextrose 5 % and sodium chloride 0.9 % infusion, 125 mL/hr, Intravenous, Continuous, Leodan Pryor PA-C, Last Rate: 125 mL/hr at 06/23/25 0225, 125 mL/hr at 06/23/25 0225    famotidine (PEPCID) tablet 40 mg, 40 mg, Oral, HS PRN, Leodan Pryor PA-C    fenofibrate (TRICOR) tablet 145 mg, 145 mg, Oral, Daily, Leodan Pryor PA-C, 145 mg at 06/23/25 0809    FLUoxetine (PROzac) capsule 10 mg, 10 mg, Oral, Daily, Leodan Pryor PA-C, 10 mg at 06/23/25 0809    folic acid (FOLVITE) tablet 1 mg, 1 mg, Oral, Daily, Leodan Pryor PA-C, 1 mg at 06/23/25 0809    insulin lispro (HumALOG/ADMELOG) 100 units/mL subcutaneous injection 1-6 Units, 1-6 Units, Subcutaneous, TID AC, 1 Units at 06/23/25 0809 **AND** Fingerstick Glucose (POCT), , , TID AC, Leodan Pryor PA-C    insulin lispro (HumALOG/ADMELOG) 100 units/mL subcutaneous injection 1-6 Units, 1-6 Units, Subcutaneous, HS, Leodan Pryor, PA-C    lactulose (CHRONULAC) oral solution 30 g, 30 g, Oral, TID, Leodan Pryor PA-C, 30 g at 06/23/25 0808    multivitamin-minerals (CENTRUM) tablet 1 tablet, 1 tablet, Oral, Daily, Leodan Pryor PA-C, 1 tablet at 06/23/25 0809    ondansetron (ZOFRAN) injection 4 mg, 4 mg, Intravenous, Q6H PRN, Leodan Pryor PA-C, 4 mg at 06/23/25 0222    pancrelipase (Lip-Prot-Amyl)  (CREON) delayed release capsule 12,000 Units, 12,000 Units, Oral, BID, Leodan Pryor PA-C, 12,000 Units at 06/23/25 0808    pancrelipase (Lip-Prot-Amyl) (CREON) delayed release capsule 36,000 Units, 36,000 Units, Oral, TID AC, Leodan Pryor PA-C, 36,000 Units at 06/23/25 0615    pantoprazole (PROTONIX) EC tablet 40 mg, 40 mg, Oral, Early Morning, Leodan Pryor PA-C, 40 mg at 06/23/25 0616    rifaximin (XIFAXAN) tablet 550 mg, 550 mg, Oral, Q12H CARRIE, Leodan Pryor PA-C, 550 mg at 06/23/25 0839    thiamine tablet 100 mg, 100 mg, Oral, Daily, Leodan Pryor PA-C, 100 mg at 06/23/25 0809    traZODone (DESYREL) tablet 50 mg, 50 mg, Oral, HS PRN, Leodan Pryor PA-C  [8]   Allergies  Allergen Reactions    Bee Venom Swelling    Percocet [Oxycodone-Acetaminophen] GI Bleeding, Vomiting and Abdominal Pain

## 2025-06-23 NOTE — ED PROVIDER NOTES
Time reflects when diagnosis was documented in both MDM as applicable and the Disposition within this note       Time User Action Codes Description Comment    6/22/2025 10:13 PM LatvianKrystle kenyon Add [F10.929] Alcohol intoxication (HCC)     6/22/2025 10:13 PM Krystle Joe Add [F10.10] Alcohol abuse           ED Disposition       ED Disposition   Transfer to Another Facility-In Network    Condition   --    Date/Time   Sun Jun 22, 2025 10:14 PM    Comment   Ashwin Wright should be transferred out to .               Assessment & Plan       Medical Decision Making  49 y/o male here for alcohol intoxication- requesting detox. Will get labs, ethanol, and speak with  for detox.     Amount and/or Complexity of Data Reviewed  Labs: ordered.    Risk  OTC drugs.  Prescription drug management.             Medications   sodium chloride 0.9 % bolus 1,000 mL (0 mL Intravenous Stopped 6/22/25 2317)   folic acid 1 mg in sodium chloride 0.9 % 50 mL IVPB (0 mg Intravenous Stopped 6/22/25 2144)   magnesium sulfate 2 g/50 mL IVPB (premix) 2 g (0 g Intravenous Stopped 6/23/25 0025)   potassium chloride (Klor-Con M20) CR tablet 20 mEq (20 mEq Oral Given 6/22/25 2227)   acetaminophen (TYLENOL) tablet 650 mg (650 mg Oral Given 6/22/25 2227)       ED Risk Strat Scores                 CIWA-Ar Score       Row Name 06/22/25 2102             CIWA-Ar    Blood Pressure 136/85      Pulse 90      Nausea and Vomiting 0      Tactile Disturbances 0      Tremor 0      Auditory Disturbances 0      Paroxysmal Sweats 0      Visual Disturbances 0      Anxiety 2                      No data recorded        SBIRT 22yo+      Flowsheet Row Most Recent Value   Initial Alcohol Screen: US AUDIT-C     1. How often do you have a drink containing alcohol? 6 Filed at: 06/22/2025 2044   2. How many drinks containing alcohol do you have on a typical day you are drinking?  6 Filed at: 06/22/2025 2044   3a. Male UNDER 65: How often do you have five or more  "drinks on one occasion? 6 Filed at: 06/22/2025 2044   Audit-C Score 18 Filed at: 06/22/2025 2044   Full Alcohol Screen: US AUDIT    4. How often during the last year have you found that you were not able to stop drinking once you had started? 4 Filed at: 06/22/2025 2044   5. How often during past year have you failed to do what was normally expected of you because of drinking?  0 Filed at: 06/22/2025 2044   6. How often in past year have you needed a first drink in the morning to get yourself going after a heavy drinking session?  0 Filed at: 06/22/2025 2044   7. How often in past year have you had feeling of guilt or remorse after drinking?  0 Filed at: 06/22/2025 2044   8. How often in past year have you been unable to remember what happened night before because you had been drinking?  0 Filed at: 06/22/2025 2044   10. Has a relative, friend, doctor or other health worker been concerned about your drinking and suggested you cut down?  0 Filed at: 06/22/2025 2044   EUSEBIO: How many times in the past year have you...    Used an illegal drug or used a prescription medication for non-medical reasons? Never Filed at: 06/22/2025 2044                            History of Present Illness       Chief Complaint   Patient presents with    Detox Evaluation     Pt states he is \"ready to get sober\" from alcohol. Pt drinks about 1 pint of vodka per day. Pt drank about 1 pint prior to arrival.        Past Medical History[1]   Past Surgical History[2]   Family History[3]   Social History[4]   E-Cigarette/Vaping    E-Cigarette Use Current Some Day User       E-Cigarette/Vaping Substances    Nicotine Yes     THC No     CBD No     Flavoring No     Other No     Unknown No       I have reviewed and agree with the history as documented.     49 y/o male presents to the ED for detox. He has a hx of alcohol abuse and drink a pint of liquor a day. Last drink was around 7p tonight. He was admitted to  a month ago but started drinking after " discharge. States that he now wants help to get sober and is suppose to go to [facility] but was told that they cannot accept him yet for detox given he was rude and angry over the phone with them earlier so was told to come here for detox first. He denies any complaints.       History provided by:  Patient  Detox Evaluation  Similar prior episodes: yes    Severity:  Moderate  Onset quality:  Sudden  Timing:  Constant  Progression:  Worsening  Chronicity:  New  Suspected agents:  Alcohol  Associated symptoms: no abdominal pain, no agitation, no confusion, no headaches, no loss of consciousness, no nausea, no seizures, no shortness of breath, no vomiting and no weakness        Review of Systems   Constitutional:  Negative for chills and fever.   HENT:  Negative for congestion, ear pain and sore throat.    Eyes:  Negative for pain and visual disturbance.   Respiratory:  Negative for cough, shortness of breath and wheezing.    Cardiovascular:  Negative for chest pain and leg swelling.   Gastrointestinal:  Negative for abdominal pain, diarrhea, nausea and vomiting.   Genitourinary:  Negative for dysuria, frequency, hematuria and urgency.   Musculoskeletal:  Negative for neck pain and neck stiffness.   Skin:  Negative for rash and wound.   Neurological:  Negative for seizures, loss of consciousness, weakness, numbness and headaches.   Psychiatric/Behavioral:  Negative for agitation and confusion.    All other systems reviewed and are negative.          Objective       ED Triage Vitals   Temperature Pulse Blood Pressure Respirations SpO2 Patient Position - Orthostatic VS   06/22/25 2023 06/22/25 2023 06/22/25 2023 06/22/25 2023 06/22/25 2023 06/22/25 2023   98 °F (36.7 °C) 86 134/89 18 97 % Lying      Temp Source Heart Rate Source BP Location FiO2 (%) Pain Score    06/22/25 2023 06/22/25 2023 06/22/25 2023 -- 06/22/25 2227    Oral Monitor Right arm  10 - Worst Possible Pain      Vitals      Date and Time Temp Pulse SpO2  Resp BP Pain Score FACES Pain Rating User   06/23/25 0024 -- 78 98 % 18 137/74 -- -- [place]   06/22/25 2227 -- -- -- -- -- 10 - Worst Possible Pain -- JK   06/22/25 2102 -- 90 -- -- 136/85 -- -- [place]   06/22/25 2030 -- 90 97 % 20 134/89 -- -- [place]   06/22/25 2023 98 °F (36.7 °C) 86 97 % 18 134/89 -- -- JK            Physical Exam  Vitals and nursing note reviewed.   Constitutional:       Appearance: He is well-developed.   HENT:      Head: Normocephalic and atraumatic.     Eyes:      Pupils: Pupils are equal, round, and reactive to light.       Cardiovascular:      Rate and Rhythm: Normal rate and regular rhythm.   Pulmonary:      Effort: Pulmonary effort is normal.      Breath sounds: Normal breath sounds.   Abdominal:      General: Bowel sounds are normal.      Palpations: Abdomen is soft.     Musculoskeletal:         General: Normal range of motion.      Cervical back: Normal range of motion and neck supple.     Skin:     General: Skin is warm and dry.     Neurological:      General: No focal deficit present.      Mental Status: He is alert and oriented to person, place, and time.      Comments: No focal deficits       Results Reviewed       Procedure Component Value Units Date/Time    Comprehensive metabolic panel [[id number]]  (Abnormal) Collected: 06/22/25 2132    Lab Status: Final result Specimen: Blood from Arm, Right Updated: 06/22/25 2157     Sodium 147 mmol/L      Potassium 3.4 mmol/L      Chloride 110 mmol/L      CO2 25 mmol/L      ANION GAP 12 mmol/L      BUN 5 mg/dL      Creatinine 0.44 mg/dL      Glucose 116 mg/dL      Calcium 8.7 mg/dL      AST 39 U/L      ALT 16 U/L      Alkaline Phosphatase 86 U/L      Total Protein 7.3 g/dL      Albumin 4.0 g/dL      Total Bilirubin 0.72 mg/dL      eGFR 131 ml/min/1.73sq m     Narrative:      National Kidney Disease Foundation guidelines for Chronic Kidney Disease (CKD):     Stage 1 with normal or high GFR (GFR > 90 mL/min/1.73 square meters)    Stage 2 Mild CKD (GFR =  60-89 mL/min/1.73 square meters)    Stage 3A Moderate CKD (GFR = 45-59 mL/min/1.73 square meters)    Stage 3B Moderate CKD (GFR = 30-44 mL/min/1.73 square meters)    Stage 4 Severe CKD (GFR = 15-29 mL/min/1.73 square meters)    Stage 5 End Stage CKD (GFR <15 mL/min/1.73 square meters)  Note: GFR calculation is accurate only with a steady state creatinine    Magnesium [[id number]]  (Abnormal) Collected: 06/22/25 2132    Lab Status: Final result Specimen: Blood from Arm, Right Updated: 06/22/25 2157     Magnesium 1.6 mg/dL     Lipase [[id number]]  (Normal) Collected: 06/22/25 2132    Lab Status: Final result Specimen: Blood from Arm, Right Updated: 06/22/25 2157     Lipase 19 u/L     Ethanol [[id number]]  (Abnormal) Collected: 06/22/25 2132    Lab Status: Final result Specimen: Blood from Arm, Right Updated: 06/22/25 2154     Ethanol Lvl 446 mg/dL     Lactic acid, plasma (w/reflex if result > 2.0) [[id number]]  (Abnormal) Collected: 06/22/25 2132    Lab Status: Final result Specimen: Blood from Arm, Right Updated: 06/22/25 2154     LACTIC ACID 3.1 mmol/L     Narrative:      Result may be elevated if tourniquet was used during collection.    CBC and differential [[id number]]  (Abnormal) Collected: 06/22/25 2132    Lab Status: Final result Specimen: Blood from Arm, Right Updated: 06/22/25 2140     WBC 6.57 Thousand/uL      RBC 4.65 Million/uL      Hemoglobin 11.2 g/dL      Hematocrit 37.0 %      MCV 80 fL      MCH 24.1 pg      MCHC 30.3 g/dL      RDW 23.0 %      MPV 9.5 fL      Platelets 181 Thousands/uL      nRBC 0 /100 WBCs      Segmented % 53 %      Immature Grans % 0 %      Lymphocytes % 36 %      Monocytes % 9 %      Eosinophils Relative 1 %      Basophils Relative 1 %      Absolute Neutrophils 3.47 Thousands/µL      Absolute Immature Grans 0.02 Thousand/uL      Absolute Lymphocytes 2.34 Thousands/µL      Absolute Monocytes 0.60 Thousand/µL      Eosinophils Absolute 0.06 Thousand/µL      Basophils Absolute 0.08  Thousands/µL     Fingerstick Glucose (POCT) [[id number]]  (Normal) Collected: 06/22/25 2028    Lab Status: Final result Specimen: Blood Updated: 06/22/25 2029     POC Glucose 120 mg/dl             No orders to display       Procedures    ED Medication and Procedure Management   Prior to Admission Medications   Prescriptions Last Dose Informant Patient Reported? Taking?   Choline Fenofibrate (Fenofibric Acid) 135 MG CPDR  Self, Spouse/Significant Other No No   Sig: TAKE ONE CAPSULE BY MOUTH DAILY   Empagliflozin 10 MG TABS  Self, Spouse/Significant Other Yes No   Sig: Take 10 mg by mouth every morning   FLUoxetine 10 MG tablet   No No   Sig: Take 1 tablet (10 mg total) by mouth daily   Insulin Pen Needle (Pen Needles) 32G X 5 MM MISC  Self, Spouse/Significant Other Yes No   Multiple Vitamins-Minerals (MULTIVITAMIN ADULTS PO)  Self, Spouse/Significant Other Yes No   Sig: Take by mouth daily after breakfast   Pancrelipase, Lip-Prot-Amyl, (Creon) 67864-447996 units CPEP  Self, Spouse/Significant Other No No   Sig: TAKE 1 CAPSULE (36,000 UNITS TOTAL) BY MOUTH 3 (THREE) TIMES A DAY BEFORE MEALS   Thiamine HCl (vitamin B-1) 250 MG tablet  Self, Spouse/Significant Other Yes No   Sig: Take 250 mg by mouth daily   albuterol (PROVENTIL HFA,VENTOLIN HFA) 90 mcg/act inhaler  Self, Spouse/Significant Other No No   Sig: Inhale 2 puffs every 6 (six) hours as needed for wheezing   carvedilol (COREG) 3.125 mg tablet  Self, Spouse/Significant Other No No   Sig: Take 2 tablets (6.25 mg total) by mouth 2 (two) times a day with meals   esomeprazole (NexIUM) 40 MG capsule  Self, Spouse/Significant Other No No   Sig: Take 1 capsule (40 mg total) by mouth if needed (heartburn)   famotidine (PEPCID) 40 MG tablet  Self, Spouse/Significant Other Yes No   Sig: Take 40 mg by mouth daily at bedtime as needed   folic acid (FOLVITE) 1 mg tablet   No No   Sig: Take 1 tablet (1 mg total) by mouth daily   lactulose (CHRONULAC) 10 g/15 mL solution   No  No   Sig: Take 45 mL (30 g total) by mouth 3 (three) times a day   rifaximin (XIFAXAN) 550 mg tablet   No No   Sig: Take 1 tablet (550 mg total) by mouth every 12 (twelve) hours   rosuvastatin (CRESTOR) 40 MG tablet  Self, Spouse/Significant Other No No   Sig: TAKE ONE TABLET BY MOUTH EVERY DAY      Facility-Administered Medications: None     Discharge Medication List as of 6/23/2025 12:49 AM        CONTINUE these medications which have NOT CHANGED    Details   albuterol (PROVENTIL HFA,VENTOLIN HFA) 90 mcg/act inhaler Inhale 2 puffs every 6 (six) hours as needed for wheezing, Starting Mon 4/21/2025, Normal      carvedilol (COREG) 3.125 mg tablet Take 2 tablets (6.25 mg total) by mouth 2 (two) times a day with meals, Starting Mon 2/10/2025, Until Sat 8/9/2025, Normal      Choline Fenofibrate (Fenofibric Acid) 135 MG CPDR TAKE ONE CAPSULE BY MOUTH DAILY, Starting Thu 3/27/2025, Normal      Empagliflozin 10 MG TABS Take 10 mg by mouth every morning, Historical Med      esomeprazole (NexIUM) 40 MG capsule Take 1 capsule (40 mg total) by mouth if needed (heartburn), Starting Mon 2/10/2025, Normal      famotidine (PEPCID) 40 MG tablet Take 40 mg by mouth daily at bedtime as needed, Historical Med      FLUoxetine 10 MG tablet Take 1 tablet (10 mg total) by mouth daily, Starting Thu 6/12/2025, Normal      folic acid (FOLVITE) 1 mg tablet Take 1 tablet (1 mg total) by mouth daily, Starting Sun 5/18/2025, Normal      Insulin Pen Needle (Pen Needles) 32G X 5 MM MISC Historical Med      lactulose (CHRONULAC) 10 g/15 mL solution Take 45 mL (30 g total) by mouth 3 (three) times a day, Starting Wed 6/11/2025, Normal      Multiple Vitamins-Minerals (MULTIVITAMIN ADULTS PO) Take by mouth daily after breakfast, Historical Med      Pancrelipase, Lip-Prot-Amyl, (Creon) 38535-358902 units CPEP TAKE 1 CAPSULE (36,000 UNITS TOTAL) BY MOUTH 3 (THREE) TIMES A DAY BEFORE MEALS, Starting Tue 9/10/2024, Normal      rifaximin (XIFAXAN) 550 mg  tablet Take 1 tablet (550 mg total) by mouth every 12 (twelve) hours, Starting Wed 6/11/2025, Until Tue 9/9/2025, Normal      rosuvastatin (CRESTOR) 40 MG tablet TAKE ONE TABLET BY MOUTH EVERY DAY, Starting Fri 7/5/2024, Normal      Thiamine HCl (vitamin B-1) 250 MG tablet Take 250 mg by mouth daily, Starting Wed 1/22/2025, Until Fri 2/21/2025, Historical Med           No discharge procedures on file.  ED SEPSIS DOCUMENTATION   Time reflects when diagnosis was documented in both MDM as applicable and the Disposition within this note       Time User Action Codes Description Comment    6/22/2025 10:13 PM Krystle Joe Add [F10.929] Alcohol intoxication (HCC)     6/22/2025 10:13 PM Krystle Joe Add [F10.10] Alcohol abuse                      [1]   Past Medical History:  Diagnosis Date    Alcoholism (HCC)     Arthritis     Asthma     Chronic pain disorder     Chronic pancreatitis (HCC)     Cirrhosis (HCC)     early cirrhosis    GERD (gastroesophageal reflux disease)     History of COVID-19 01/2022    mild s/s    Hyperlipidemia     Hypertension     Liver abscess     Liver disease     Meniscus tear     left knee work injury last assessed 08/24/2016    Pancreatitis     Pneumonia     Rotator cuff tear     Stomach disorder     Chronic gastritus   [2]   Past Surgical History:  Procedure Laterality Date    CELIAC PLEXUS BLOCK Bilateral 10/29/2018    Procedure: SPLANCHNIC NERVE BLOCK;  Surgeon: Ramiro Chinchilla MD;  [facility]: [facility];  Service: Pain Management     CELIAC PLEXUS BLOCK Bilateral 01/10/2019    Procedure: SPLANCHNIC NERVE BLOCK;  Surgeon: Ramiro Chinchilla MD;  [facility]: [facility];  Service: Pain Management     CHOLECYSTECTOMY      COLONOSCOPY      ESOPHAGOGASTRODUODENOSCOPY N/A 11/16/2017    Procedure: ESOPHAGOGASTRODUODENOSCOPY (EGD);  Surgeon: Ever Bonner MD;  Location: [facility];  Service: Gastroenterology    ESOPHAGOGASTRODUODENOSCOPY N/A 04/19/2018    Procedure: ESOPHAGOGASTRODUODENOSCOPY (EGD);   Surgeon: Orestes Forrest MD;  [facility]: [facility];  Service: Gastroenterology    ESOPHAGOGASTRODUODENOSCOPY N/A 05/10/2018    Procedure: ESOPHAGOGASTRODUODENOSCOPY (EGD);  Surgeon: Vaibhav Matthew MD;  [facility];  Service: Gastroenterology    ESOPHAGUS SURGERY  01/17/2025    nubia galindo tear repain    HERNIA REPAIR Bilateral 02/03/2023    Procedure: REPAIR HERNIA INGUINAL, LAPAROSCOPIC,;  Surgeon: Juanjo Travis DO;  [facility]: [facility];  Service: General    IR TEMPORARY DIALYSIS CATHETER PLACEMENT  02/28/2019    KNEE ARTHROSCOPY Bilateral     KNEE ARTHROSCOPY Right 2009    cibishino last assessed 08/24/2016    KNEE ARTHROSCOPY Right 07/01/2019    LIVER SURGERY      NERVE BLOCK Bilateral 05/29/2018    Procedure: SPLANCHNIC NERVE BLOCK;  Surgeon: Ramiro Chinchilla MD;  [facility];  Service: Pain Management     PANCREAS SURGERY      stents    PANCREATIC CYST EXCISION      PA INJECTION ANES LMBR/THRC PARAVERTBRL SYMPATHETIC Bilateral 12/28/2017    Procedure: SPLANCHNIC NERVE BLOCK;  Surgeon: Ramiro Chinchilla MD;  [facility];  Service: Pain Management     PA INJX ANES CELIAC PLEXUS W/WO RADIOLOGIC MONITRNG Bilateral 05/09/2017    Procedure: CELIAC PLEXUS BLOCK ;  Surgeon: Ramiro Chinchilla MD;  [facility];  Service: Pain Management     PA INJX ANES CELIAC PLEXUS W/WO RADIOLOGIC MONITRNG Bilateral 06/01/2017    Procedure: SPLANCHNIC NERVE BLOCK at T12;  Surgeon: Ramiro Chinchilla MD;  [facility];  Service: Pain Management     PA INJX ANES CELIAC PLEXUS W/WO RADIOLOGIC MONITRNG Bilateral 08/08/2017    Procedure: BILATERAL SPLANCHNIC NERVE BLOCK T12;  Surgeon: Ramiro Chinchilla MD;  [facility];  Service: Pain Management     PA INJX ANES CELIAC PLEXUS W/WO RADIOLOGIC MONITRNG Bilateral 04/18/2019    Procedure: BLOCK / INJECTION CELIAC PLEXUS;  Surgeon: Ramiro Chinchilla MD;  [facility];  Service: Pain Management     PA INJX ANES CELIAC PLEXUS W/WO RADIOLOGIC  MONITRNG Bilateral 2019    Procedure: SPLANCHNIC NERVE BLOCK;  Surgeon: Ramiro Chinchilla MD;  [facility];  Service: Pain Management     MA INJX ANES CELIAC PLEXUS W/WO RADIOLOGIC MONITRNG Bilateral 10/17/2019    Procedure: SPLANCHNIC NERVE BLOCK;  Surgeon: Ramiro Chinchilla MD;  [facility];  Service: Pain Management     MA LAPAROSCOPY SURG CHOLECYSTECTOMY N/A 2017    Procedure: LAPAROSCOPIC CHOLECYSTECTOMY, IOC, POSSIBLE OPEN.;  Surgeon: Suresh Lang MD;  [facility];  Service: General    MA SURGICAL ARTHROSCOPY SHOULDER W/ROTATOR CUFF RPR Left 2023    Procedure: Left Shoulder Arthroscopic Rotator Cuff Repair, Open Subpectoral Biceps Tenodesis, Acromioplasty, Extensive Debridement;  Surgeon: Toro Healy DO;  [facility];  Service: Orthopedics    ROTATOR CUFF REPAIR Right     SHOULDER ARTHROSCOPY      SHOULDER ARTHROSCOPY Right     SHOULDER SURGERY      TENDON REPAIR      Right shoulder   [3]   Family History  Problem Relation Name Age of Onset    Cirrhosis Mother      Heart disease Other grandfather         cardiac disorder    Cancer Other grandmother    [4]   Social History  Tobacco Use    Smoking status: Former     Current packs/day: 0.00     Average packs/day: 1 pack/day for 20.0 years (20.0 ttl pk-yrs)     Types: Cigarettes     Start date: 3/15/2001     Quit date: 3/15/2021     Years since quittin.2    Smokeless tobacco: Never   Vaping Use    Vaping status: Some Days    Substances: Nicotine   Substance Use Topics    Alcohol use: Not Currently     Comment: Pt relapsed one day after D/c from Detox in May 2025. Drinks 2-3 Fifth bottles of Vodka, and 2-3 6-packs of 12 oz beer. Last drink 25 around 7pm    Drug use: No        Krystle Joe DO  25 1524

## 2025-06-24 ENCOUNTER — APPOINTMENT (OUTPATIENT)
Dept: RADIOLOGY | Facility: HOSPITAL | Age: 50
End: 2025-06-24
Payer: COMMERCIAL

## 2025-06-24 PROBLEM — F41.9 ANXIETY: Status: ACTIVE | Noted: 2025-06-24

## 2025-06-24 PROBLEM — R07.89 CHEST WALL TENDERNESS: Status: ACTIVE | Noted: 2025-06-24

## 2025-06-24 LAB
ALBUMIN SERPL BCG-MCNC: 3.2 G/DL (ref 3.5–5)
ALP SERPL-CCNC: 89 U/L (ref 34–104)
ALT SERPL W P-5'-P-CCNC: 15 U/L (ref 7–52)
ANION GAP SERPL CALCULATED.3IONS-SCNC: 7 MMOL/L (ref 4–13)
AST SERPL W P-5'-P-CCNC: 36 U/L (ref 13–39)
BILIRUB SERPL-MCNC: 1.16 MG/DL (ref 0.2–1)
BUN SERPL-MCNC: 4 MG/DL (ref 5–25)
CALCIUM ALBUM COR SERPL-MCNC: 8.8 MG/DL (ref 8.3–10.1)
CALCIUM SERPL-MCNC: 8.2 MG/DL (ref 8.4–10.2)
CHLORIDE SERPL-SCNC: 107 MMOL/L (ref 96–108)
CO2 SERPL-SCNC: 26 MMOL/L (ref 21–32)
CREAT SERPL-MCNC: 0.37 MG/DL (ref 0.6–1.3)
ERYTHROCYTE [DISTWIDTH] IN BLOOD BY AUTOMATED COUNT: 22.4 % (ref 11.6–15.1)
GFR SERPL CREATININE-BSD FRML MDRD: 141 ML/MIN/1.73SQ M
GLUCOSE SERPL-MCNC: 101 MG/DL (ref 65–140)
GLUCOSE SERPL-MCNC: 127 MG/DL (ref 65–140)
GLUCOSE SERPL-MCNC: 129 MG/DL (ref 65–140)
GLUCOSE SERPL-MCNC: 137 MG/DL (ref 65–140)
GLUCOSE SERPL-MCNC: 152 MG/DL (ref 65–140)
HCT VFR BLD AUTO: 31.7 % (ref 36.5–49.3)
HGB BLD-MCNC: 9.1 G/DL (ref 12–17)
MCH RBC QN AUTO: 24.1 PG (ref 26.8–34.3)
MCHC RBC AUTO-ENTMCNC: 28.7 G/DL (ref 31.4–37.4)
MCV RBC AUTO: 84 FL (ref 82–98)
PLATELET # BLD AUTO: 97 THOUSANDS/UL (ref 149–390)
PMV BLD AUTO: 10.1 FL (ref 8.9–12.7)
POTASSIUM SERPL-SCNC: 3.5 MMOL/L (ref 3.5–5.3)
PROT SERPL-MCNC: 5.5 G/DL (ref 6.4–8.4)
RBC # BLD AUTO: 3.77 MILLION/UL (ref 3.88–5.62)
SODIUM SERPL-SCNC: 140 MMOL/L (ref 135–147)
WBC # BLD AUTO: 3.31 THOUSAND/UL (ref 4.31–10.16)

## 2025-06-24 PROCEDURE — 99232 SBSQ HOSP IP/OBS MODERATE 35: CPT

## 2025-06-24 PROCEDURE — 85027 COMPLETE CBC AUTOMATED: CPT | Performed by: INTERNAL MEDICINE

## 2025-06-24 PROCEDURE — 71045 X-RAY EXAM CHEST 1 VIEW: CPT

## 2025-06-24 PROCEDURE — 99233 SBSQ HOSP IP/OBS HIGH 50: CPT | Performed by: EMERGENCY MEDICINE

## 2025-06-24 PROCEDURE — 82948 REAGENT STRIP/BLOOD GLUCOSE: CPT

## 2025-06-24 PROCEDURE — 80053 COMPREHEN METABOLIC PANEL: CPT | Performed by: INTERNAL MEDICINE

## 2025-06-24 RX ORDER — GABAPENTIN 300 MG/1
300 CAPSULE ORAL 3 TIMES DAILY
Status: DISCONTINUED | OUTPATIENT
Start: 2025-06-24 | End: 2025-06-27 | Stop reason: HOSPADM

## 2025-06-24 RX ORDER — HYDROXYZINE HYDROCHLORIDE 50 MG/1
50 TABLET, FILM COATED ORAL EVERY 6 HOURS PRN
Status: DISCONTINUED | OUTPATIENT
Start: 2025-06-24 | End: 2025-06-27 | Stop reason: HOSPADM

## 2025-06-24 RX ADMIN — MORPHINE SULFATE 2 MG: 2 INJECTION, SOLUTION INTRAMUSCULAR; INTRAVENOUS at 11:48

## 2025-06-24 RX ADMIN — LACTULOSE 30 G: 20 SOLUTION ORAL at 22:25

## 2025-06-24 RX ADMIN — MORPHINE SULFATE 2 MG: 2 INJECTION, SOLUTION INTRAMUSCULAR; INTRAVENOUS at 20:46

## 2025-06-24 RX ADMIN — FLUOXETINE HYDROCHLORIDE 10 MG: 10 CAPSULE ORAL at 08:39

## 2025-06-24 RX ADMIN — PANCRELIPASE 36000 UNITS: 120000; 24000; 76000 CAPSULE, DELAYED RELEASE PELLETS ORAL at 08:45

## 2025-06-24 RX ADMIN — GABAPENTIN 300 MG: 300 CAPSULE ORAL at 22:26

## 2025-06-24 RX ADMIN — RIFAXIMIN 550 MG: 550 TABLET ORAL at 08:40

## 2025-06-24 RX ADMIN — LACTULOSE 30 G: 20 SOLUTION ORAL at 16:49

## 2025-06-24 RX ADMIN — PANCRELIPASE 36000 UNITS: 120000; 24000; 76000 CAPSULE, DELAYED RELEASE PELLETS ORAL at 16:49

## 2025-06-24 RX ADMIN — MULTIPLE VITAMINS W/ MINERALS TAB 1 TABLET: TAB ORAL at 08:40

## 2025-06-24 RX ADMIN — GABAPENTIN 300 MG: 300 CAPSULE ORAL at 16:49

## 2025-06-24 RX ADMIN — DEXTROSE AND SODIUM CHLORIDE 125 ML/HR: 5; .9 INJECTION, SOLUTION INTRAVENOUS at 05:08

## 2025-06-24 RX ADMIN — ATORVASTATIN CALCIUM 80 MG: 80 TABLET, FILM COATED ORAL at 16:49

## 2025-06-24 RX ADMIN — INSULIN LISPRO 1 UNITS: 100 INJECTION, SOLUTION INTRAVENOUS; SUBCUTANEOUS at 11:43

## 2025-06-24 RX ADMIN — FOLIC ACID 1 MG: 1 TABLET ORAL at 08:40

## 2025-06-24 RX ADMIN — MORPHINE SULFATE 2 MG: 2 INJECTION, SOLUTION INTRAMUSCULAR; INTRAVENOUS at 16:50

## 2025-06-24 RX ADMIN — PANCRELIPASE 36000 UNITS: 120000; 24000; 76000 CAPSULE, DELAYED RELEASE PELLETS ORAL at 11:47

## 2025-06-24 RX ADMIN — CARVEDILOL 6.25 MG: 6.25 TABLET, FILM COATED ORAL at 16:49

## 2025-06-24 RX ADMIN — RIFAXIMIN 550 MG: 550 TABLET ORAL at 22:26

## 2025-06-24 RX ADMIN — THIAMINE HCL TAB 100 MG 100 MG: 100 TAB at 08:40

## 2025-06-24 RX ADMIN — PANCRELIPASE 12000 UNITS: 30000; 6000; 19000 CAPSULE, DELAYED RELEASE PELLETS ORAL at 22:26

## 2025-06-24 RX ADMIN — CARVEDILOL 6.25 MG: 6.25 TABLET, FILM COATED ORAL at 08:40

## 2025-06-24 RX ADMIN — LACTULOSE 30 G: 20 SOLUTION ORAL at 08:39

## 2025-06-24 RX ADMIN — FENOFIBRATE 145 MG: 145 TABLET, FILM COATED ORAL at 08:39

## 2025-06-24 RX ADMIN — PANCRELIPASE 12000 UNITS: 30000; 6000; 19000 CAPSULE, DELAYED RELEASE PELLETS ORAL at 08:40

## 2025-06-24 RX ADMIN — GABAPENTIN 300 MG: 300 CAPSULE ORAL at 08:40

## 2025-06-24 RX ADMIN — BUSPIRONE HYDROCHLORIDE 5 MG: 5 TABLET ORAL at 17:00

## 2025-06-24 RX ADMIN — HYDROXYZINE HYDROCHLORIDE 50 MG: 50 TABLET ORAL at 14:11

## 2025-06-24 RX ADMIN — BUSPIRONE HYDROCHLORIDE 5 MG: 5 TABLET ORAL at 08:40

## 2025-06-24 RX ADMIN — PANTOPRAZOLE SODIUM 40 MG: 40 TABLET, DELAYED RELEASE ORAL at 05:08

## 2025-06-24 NOTE — CERTIFIED RECOVERY SPECIALIST
Certified  Note      Name: Ashwin Wright 50 y.o. male I MRN: [id number]  Unit/Bed#: 5T DETOX 512-01 I Date of Admission: 6/23/2025   Date of Service: 6/24/2025 I Hospital Day: 1    Summary of Contact     CRS follow up with patient.     CRS and patient had conversation that supports and encourages recovery. Discussed plans for aftercare and continued sobriety. Patient presents as pleasant and optimistic about future. Patient still planning to go into IP rehab at [facility].     Patient did indicate that he is very anxious and asked if he could get Atarax      Follow up: 06/25/2025          Administrative Statements  I have spent a total time of 12 minutes in caring for this patient on the day of the visit/encounter.    Kelli Felder

## 2025-06-24 NOTE — PROGRESS NOTES
Progress Note - Medical Toxicology   Name: Ashwin Wright 50 y.o. male I MRN: [id number]  Unit/Bed#: 5T DETOX 512-01 I Date of Admission: 6/23/2025   Date of Service: 6/24/2025 I Hospital Day: 1    Assessment & Plan  Alcohol use disorder, severe, dependence (HCC)  CM/CRS for aftercare planning and linkage to ongoing AUD treatment after discharge  He is getting a CXR for pain the he states is from rolling his tractor yesterday. He was not complaining of this pain then. If CXR does not reveal anything that would require opioids then we can consider starting naltrexone prior to discharge.   Alcohol withdrawal (HCC)   Withdrawal is adequately controlled with phenobarbital. He has received about 1356 mg thus far  We will continue SEWS for now, but I anticipate he will be able to come off the protocol within 24 hours.     Cirrhosis of liver without ascites, unspecified hepatic cirrhosis type (HCC)    Thoracic compression fracture (HCC)      Reason for current consult: Alcohol withdrawal     Subjective   Feels better. No acute events overnight    Objective :  Temp:  [97.5 °F (36.4 °C)-98.5 °F (36.9 °C)] 97.6 °F (36.4 °C)  HR:  [75-97] 76  BP: (120-162)/(71-90) 153/90  Resp:  [17-18] 18  SpO2:  [92 %-98 %] 94 %  O2 Device: None (Room air)      Intake/Output Summary (Last 24 hours) at 6/24/2025 0933  Last data filed at 6/24/2025 0832  Gross per 24 hour   Intake 780 ml   Output 2 ml   Net 778 ml       Physical Exam  Constitutional:       General: He is not in acute distress.    Cardiovascular:      Rate and Rhythm: Normal rate and regular rhythm.   Pulmonary:      Effort: Pulmonary effort is normal.      Breath sounds: Normal breath sounds.     Neurological:      Mental Status: He is alert and oriented to person, place, and time.      Comments: Minimal tremor. No tongue fasciculations         Lab Results: I have reviewed the following results:CBC/BMP:   .     06/24/25  0506   WBC 3.31*   HGB 9.1*   HCT 31.7*   PLT 97*    SODIUM 140   K 3.5      CO2 26   BUN 4*   CREATININE 0.37*   GLUC 127        Imaging Results Review: No pertinent imaging studies reviewed.  Other Study Results Review: No additional pertinent studies reviewed.    Administrative Statements   I have spent a total time of 20 minutes in caring for this patient on the day of the visit/encounter including Impressions, Obtaining or reviewing history  , and Communicating with other healthcare professionals .

## 2025-06-24 NOTE — ASSESSMENT & PLAN NOTE
Patient endorsing severe anxiety in the setting of acute alcohol withdrawal  Will start gabapentin 300 mg TID for post acute withdrawal syndrome   Continue atarax

## 2025-06-24 NOTE — ASSESSMENT & PLAN NOTE
CM/CRS for aftercare planning and linkage to ongoing AUD treatment after discharge  He is getting a CXR for pain the he states is from rolling his tractor yesterday. He was not complaining of this pain then. If CXR does not reveal anything that would require opioids then we can consider starting naltrexone prior to discharge.

## 2025-06-24 NOTE — ASSESSMENT & PLAN NOTE
Lab Results   Component Value Date    HGBA1C 5.6 05/12/2025     Recent Labs     06/23/25  1117 06/23/25  1547 06/23/25  2105 06/24/25  0631   POCGLU 118 129 126 137     Prior to admission on Jardiance.  Placed on sliding scale insulin during hospitalization

## 2025-06-24 NOTE — PLAN OF CARE
Problem: SAFETY ADULT  Goal: Patient will remain free of falls  Description: INTERVENTIONS:  - Educate patient on patient safety including physical limitations  - Instruct patient to call for assistance with activity   - Keep Call bell within reach  - Keep bed low and locked with side rails adjusted as appropriate  - Keep care items and personal belongings within reach  - Initiate and maintain comfort rounds  - Make Fall Risk Sign visible to staff  - Initiate/Maintain chair alarm  - Apply yellow socks and bracelet for high fall risk patients  - Consider moving patient to room near nurses station  Outcome: Progressing  Goal: Maintain or return to baseline ADL function  Description: INTERVENTIONS:  -  Assess patient's ability to carry out ADLs; assess patient's baseline for ADL function and identify physical deficits which impact ability to perform ADLs (bathing, care of mouth/teeth, toileting, grooming, dressing, etc.)  - Assess/evaluate cause of self-care deficits   - Assess range of motion  - Assess patient's mobility; develop plan if impaired  - Assess patient's need for assistive devices and provide as appropriate  - Provide patient education as appropriate  - Monitor functional capacity and physical performance, use of AM PAC & -HLM   - Monitor gait, balance and fatigue with ambulation    Outcome: Progressing  Goal: Maintains/Returns to pre admission functional level  Description: INTERVENTIONS:  - Perform AM-PAC 6 Click Basic Mobility/ Daily Activity assessment daily.  - Set and communicate daily mobility goal to care team and patient.   - Collaborate with rehabilitation services on mobility goals if consulted  - Out of bed for toileting  - Record patient progress and toleration of activity level   Outcome: Progressing     Problem: Knowledge Deficit  Goal: Patient/family/caregiver demonstrates understanding of disease process, treatment plan, medications, and discharge instructions  Description: Complete  learning assessment and assess knowledge base.  Interventions:  - Provide teaching at level of understanding  - Provide teaching via preferred learning methods  Outcome: Progressing     Problem: DISCHARGE PLANNING  Goal: Discharge to home or other facility with appropriate resources  Description: INTERVENTIONS:  - Identify barriers to discharge w/patient  - Arrange for needed discharge resources and transportation as appropriate  - Identify discharge learning needs (meds, wound care, etc.)  - Arrange for interpretive services to assist at discharge as needed  - Refer to Case Management Department for coordinating discharge planning if the patient needs post-hospital services based on physician/advanced practitioner order or complex needs related to functional status, cognitive ability, or social support system  Outcome: Progressing     Problem: PAIN - ADULT  Goal: Verbalizes/displays adequate comfort level or baseline comfort level  Description: Interventions:  - Encourage patient to monitor pain and request assistance  - Assess pain using appropriate pain scale  - Administer analgesics as ordered based on type and severity of pain and evaluate response  - Implement non-pharmacological measures as appropriate and evaluate response  - Consider cultural and social influences on pain and pain management  - Notify physician/advanced practitioner if interventions unsuccessful or patient reports new pain  - Educate patient on pain management process including their role and importance of  reporting pain   - Provide non-pharmacologic/complimentary pain relief interventions  Outcome: Progressing

## 2025-06-24 NOTE — ASSESSMENT & PLAN NOTE
Patient is endorsing chest wall tenderness upon morning evaluation. He states that he rolled his tractor yesterday prior to coming into the ED   He has tenderness upon exam, no bruising   Obtain CXR   Continue to monitor

## 2025-06-24 NOTE — PLAN OF CARE
Problem: PAIN - ADULT  Goal: Verbalizes/displays adequate comfort level or baseline comfort level  Description: Interventions:  - Encourage patient to monitor pain and request assistance  - Assess pain using appropriate pain scale  - Administer analgesics as ordered based on type and severity of pain and evaluate response  - Implement non-pharmacological measures as appropriate and evaluate response  - Consider cultural and social influences on pain and pain management  - Notify physician/advanced practitioner if interventions unsuccessful or patient reports new pain  - Educate patient on pain management process including their role and importance of  reporting pain   - Provide non-pharmacologic/complimentary pain relief interventions  Outcome: Progressing     Problem: SUBSTANCE USE/ABUSE  Goal: By discharge, will develop insight into their chemical dependency and sustain motivation to continue in recovery  Description: INTERVENTIONS:  - Attends all daily group sessions and scheduled AA groups  - Actively practices coping skills through participation in the therapeutic community and adherence to program rules  - Reviews and completes assignments from individual treatment plan  - Assist patient development of understanding of their personal cycle of addiction and relapse triggers  Outcome: Progressing     Problem: SUBSTANCE USE/ABUSE  Goal: By discharge, patient will have ongoing treatment plan addressing chemical dependency  Description: INTERVENTIONS:  - Assist patient with resources and/or appointments for ongoing recovery based living  Outcome: Progressing     Problem: Knowledge Deficit  Goal: Patient/family/caregiver demonstrates understanding of disease process, treatment plan, medications, and discharge instructions  Description: Complete learning assessment and assess knowledge base.  Interventions:  - Provide teaching at level of understanding  - Provide teaching via preferred learning methods  Outcome:  Progressing     Problem: DISCHARGE PLANNING  Goal: Discharge to home or other facility with appropriate resources  Description: INTERVENTIONS:  - Identify barriers to discharge w/patient  - Arrange for needed discharge resources and transportation as appropriate  - Identify discharge learning needs (meds, wound care, etc.)  - Arrange for interpretive services to assist at discharge as needed  - Refer to Case Management Department for coordinating discharge planning if the patient needs post-hospital services based on physician/advanced practitioner order or complex needs related to functional status, cognitive ability, or social support system  Outcome: Progressing

## 2025-06-24 NOTE — ASSESSMENT & PLAN NOTE
History of chronic pancreatitis, cirrhosis of liver, hepatic encephalopathy, esophageal varices, and thoracic vertebral fracture transferred here for alcohol detox  Recent admission to the withdrawal management unit in May   Patient reports to relapsing shortly after discharge   Continue thiamine and folic acid supplementation   Management per toxicology recommendations with SEWS protocol

## 2025-06-24 NOTE — PROGRESS NOTES
Progress Note - Hospitalist   Name: Ashwin Wright 50 y.o. male I MRN: [id number]  Unit/Bed#: 5T DETOX 512-01 I Date of Admission: 6/23/2025   Date of Service: 6/24/2025 I Hospital Day: 1    Assessment & Plan  Alcohol withdrawal (HCC)  H/o chronic daily alcohol consumption, denies h/o withdrawal seizures  Last drink: 6/23   Toxicology consulted;   Continue SEWS protocol with symptom-triggered phenobarbital for medically-assisted alcohol withdrawal  Current symptoms include anxiety, headache, nausea   Total phenobarbital: 1365 mg of phenobarbital   Telemetry and pulse oximetry monitoring   Continue to monitor vitals, symptoms      Alcohol use disorder, severe, dependence (HCC)  History of chronic pancreatitis, cirrhosis of liver, hepatic encephalopathy, esophageal varices, and thoracic vertebral fracture transferred here for alcohol detox  Recent admission to the withdrawal management unit in May   Patient reports to relapsing shortly after discharge   Continue thiamine and folic acid supplementation   Management per toxicology recommendations with SEWS protocol  Cirrhosis of liver without ascites, unspecified hepatic cirrhosis type (HCC)  Follows with gastroenterology as an outpatient.  Continue carvedilol for history of varices  Thoracic compression fracture (HCC)  Recent imaging with subacute fractures T2-T5  Patient wearing a spinal brace  Controlled type 2 diabetes mellitus with hyperglycemia, with long-term current use of insulin (HCC)  Lab Results   Component Value Date    HGBA1C 5.6 05/12/2025     Recent Labs     06/23/25  1117 06/23/25  1547 06/23/25  2105 06/24/25  0631   POCGLU 118 129 126 137     Prior to admission on Jardiance.  Placed on sliding scale insulin during hospitalization  Hypertriglyceridemia  Continue atorvastatin  Chronic pancreatitis (HCC)  Continue Creon  Chronic gastritis without bleeding  Continue famotidine and PPI  Hypertension  Continue carvedilol  Chest wall tenderness  Patient is  endorsing chest wall tenderness upon morning evaluation. He states that he rolled his tractor yesterday prior to coming into the ED   He has tenderness upon exam, no bruising   Obtain CXR   Continue to monitor   Anxiety  Patient endorsing severe anxiety in the setting of acute alcohol withdrawal  Will start gabapentin 300 mg TID for post acute withdrawal syndrome   Continue atarax     VTE Pharmacologic Prophylaxis: VTE Score: 1 Low Risk (Score 0-2) - Encourage Ambulation.    Mobility:   Basic Mobility Inpatient Raw Score: 21  JH-HLM Goal: 6: Walk 10 steps or more  JH-HLM Achieved: 7: Walk 25 feet or more  JH-HLM Goal achieved. Continue to encourage appropriate mobility.    Patient Centered Rounds: I performed bedside rounds with nursing staff today.   Discussions with Specialists or Other Care Team Provider: Case Management     Education and Discussions with Family / Patient: Patient declined call to .     Current Length of Stay: 1 day(s)  Current Patient Status: Observation   Certification Statement: The patient will continue to require additional inpatient hospital stay due to alcohol withdrawal on SEWS protocol   Discharge Plan: Anticipate discharge in 24-48 hrs to home.    Code Status: Level 1 - Full Code    Subjective   Patient endorsing chest wall tenderness, dull pain. States onset occurred prior to admission when he rolled his tractor. No bruising noted on examination. Patient reports he was wearing his TSLO brace when it occurred.   From a withdrawal standpoint he is endorsing severe anxiety     Objective :  Temp:  [97.5 °F (36.4 °C)-98.5 °F (36.9 °C)] 97.6 °F (36.4 °C)  HR:  [75-97] 76  BP: (120-162)/(71-90) 153/90  Resp:  [17-18] 18  SpO2:  [92 %-98 %] 94 %  O2 Device: None (Room air)    Body mass index is 24.32 kg/m².     Input and Output Summary (last 24 hours):     Intake/Output Summary (Last 24 hours) at 6/24/2025 0902  Last data filed at 6/24/2025 0832  Gross per 24 hour   Intake 780 ml    Output 2 ml   Net 778 ml       Physical Exam  Vitals reviewed.   Constitutional:       General: He is not in acute distress.     Appearance: He is not diaphoretic.   HENT:      Head: Normocephalic and atraumatic.   Pulmonary:      Breath sounds: Normal breath sounds.   Chest:      Chest wall: Tenderness present. No lacerations, deformity, swelling or edema.   Abdominal:      Tenderness: There is no abdominal tenderness.     Skin:     Findings: No ecchymosis.     Neurological:      Mental Status: He is alert and oriented to person, place, and time.      Motor: No tremor.     Psychiatric:         Mood and Affect: Mood is anxious.           Lines/Drains:        Telemetry:  Telemetry Orders (From admission, onward)               24 Hour Telemetry Monitoring  Continuous x 24 Hours (Telem)        Expiring   Question:  Reason for 24 Hour Telemetry  Answer:  Alcohol withdrawal and CIWA >7, electrolyte abnormalities, abnormal ECG and/or heart disease                     Telemetry Reviewed: Normal Sinus Rhythm  Indication for Continued Telemetry Use: Metabolic/electrolyte disturbance with high probability of dysrhythmia               Lab Results: I have reviewed the following results:   Results from last 7 days   Lab Units 06/24/25  0506 06/22/25  2132   WBC Thousand/uL 3.31* 6.57   HEMOGLOBIN g/dL 9.1* 11.2*   HEMATOCRIT % 31.7* 37.0   PLATELETS Thousands/uL 97* 181   SEGS PCT %  --  53   LYMPHO PCT %  --  36   MONO PCT %  --  9   EOS PCT %  --  1     Results from last 7 days   Lab Units 06/24/25  0506   SODIUM mmol/L 140   POTASSIUM mmol/L 3.5   CHLORIDE mmol/L 107   CO2 mmol/L 26   BUN mg/dL 4*   CREATININE mg/dL 0.37*   ANION GAP mmol/L 7   CALCIUM mg/dL 8.2*   ALBUMIN g/dL 3.2*   TOTAL BILIRUBIN mg/dL 1.16*   ALK PHOS U/L 89   ALT U/L 15   AST U/L 36   GLUCOSE RANDOM mg/dL 127         Results from last 7 days   Lab Units 06/24/25  0631 06/23/25  2105 06/23/25  1547 06/23/25  1117 06/23/25  0623 06/22/25  2028   POC  GLUCOSE mg/dl 137 126 129 118 180* 120         Results from last 7 days   Lab Units 06/22/25  2132   LACTIC ACID mmol/L 3.1*       Recent Cultures (last 7 days):         Imaging Results Review: No pertinent imaging studies reviewed.  Other Study Results Review: EKG was reviewed.     Last 24 Hours Medication List:     Current Facility-Administered Medications:     albuterol (PROVENTIL HFA,VENTOLIN HFA) inhaler 2 puff, Q6H PRN    atorvastatin (LIPITOR) tablet 80 mg, Daily With Dinner    busPIRone (BUSPAR) tablet 5 mg, BID    carvedilol (COREG) tablet 6.25 mg, BID With Meals    dextrose 5 % and sodium chloride 0.9 % infusion, Continuous, Last Rate: 125 mL/hr (06/24/25 0508)    famotidine (PEPCID) tablet 40 mg, HS PRN    fenofibrate (TRICOR) tablet 145 mg, Daily    FLUoxetine (PROzac) capsule 10 mg, Daily    folic acid (FOLVITE) tablet 1 mg, Daily    gabapentin (NEURONTIN) capsule 300 mg, TID    insulin lispro (HumALOG/ADMELOG) 100 units/mL subcutaneous injection 1-6 Units, TID AC **AND** Fingerstick Glucose (POCT), TID AC    insulin lispro (HumALOG/ADMELOG) 100 units/mL subcutaneous injection 1-6 Units, HS    lactulose (CHRONULAC) oral solution 30 g, TID    multivitamin-minerals (CENTRUM) tablet 1 tablet, Daily    ondansetron (ZOFRAN) injection 4 mg, Q6H PRN    pancrelipase (Lip-Prot-Amyl) (CREON) delayed release capsule 12,000 Units, BID    pancrelipase (Lip-Prot-Amyl) (CREON) delayed release capsule 36,000 Units, TID AC    pantoprazole (PROTONIX) EC tablet 40 mg, Early Morning    rifaximin (XIFAXAN) tablet 550 mg, Q12H CARRIE    thiamine tablet 100 mg, Daily    traZODone (DESYREL) tablet 50 mg, HS PRN    Administrative Statements   Today, Patient Was Seen By: Tahmina Winkler PA-C      **Please Note: This note may have been constructed using a voice recognition system.**

## 2025-06-24 NOTE — ASSESSMENT & PLAN NOTE
Withdrawal is adequately controlled with phenobarbital. He has received about 1356 mg thus far  We will continue SEWS for now, but I anticipate he will be able to come off the protocol within 24 hours.

## 2025-06-24 NOTE — ASSESSMENT & PLAN NOTE
H/o chronic daily alcohol consumption, denies h/o withdrawal seizures  Last drink: 6/23   Toxicology consulted;   Continue SEWS protocol with symptom-triggered phenobarbital for medically-assisted alcohol withdrawal  Current symptoms include anxiety, headache, nausea   Total phenobarbital: 1365 mg of phenobarbital   Telemetry and pulse oximetry monitoring   Continue to monitor vitals, symptoms

## 2025-06-25 ENCOUNTER — APPOINTMENT (INPATIENT)
Dept: RADIOLOGY | Facility: HOSPITAL | Age: 50
End: 2025-06-25
Payer: COMMERCIAL

## 2025-06-25 ENCOUNTER — APPOINTMENT (INPATIENT)
Dept: NON INVASIVE DIAGNOSTICS | Facility: HOSPITAL | Age: 50
End: 2025-06-25
Payer: COMMERCIAL

## 2025-06-25 PROBLEM — M79.602 PAIN OF LEFT UPPER EXTREMITY: Status: ACTIVE | Noted: 2025-06-25

## 2025-06-25 LAB
ALBUMIN SERPL BCG-MCNC: 3.2 G/DL (ref 3.5–5)
ALP SERPL-CCNC: 93 U/L (ref 34–104)
ALT SERPL W P-5'-P-CCNC: 15 U/L (ref 7–52)
ANION GAP SERPL CALCULATED.3IONS-SCNC: 6 MMOL/L (ref 4–13)
AST SERPL W P-5'-P-CCNC: 32 U/L (ref 13–39)
BILIRUB SERPL-MCNC: 0.8 MG/DL (ref 0.2–1)
BUN SERPL-MCNC: 4 MG/DL (ref 5–25)
CALCIUM ALBUM COR SERPL-MCNC: 9.2 MG/DL (ref 8.3–10.1)
CALCIUM SERPL-MCNC: 8.6 MG/DL (ref 8.4–10.2)
CHLORIDE SERPL-SCNC: 106 MMOL/L (ref 96–108)
CO2 SERPL-SCNC: 28 MMOL/L (ref 21–32)
CREAT SERPL-MCNC: 0.39 MG/DL (ref 0.6–1.3)
ERYTHROCYTE [DISTWIDTH] IN BLOOD BY AUTOMATED COUNT: 22.2 % (ref 11.6–15.1)
GFR SERPL CREATININE-BSD FRML MDRD: 137 ML/MIN/1.73SQ M
GLUCOSE SERPL-MCNC: 109 MG/DL (ref 65–140)
GLUCOSE SERPL-MCNC: 115 MG/DL (ref 65–140)
GLUCOSE SERPL-MCNC: 155 MG/DL (ref 65–140)
GLUCOSE SERPL-MCNC: 92 MG/DL (ref 65–140)
GLUCOSE SERPL-MCNC: 98 MG/DL (ref 65–140)
HCT VFR BLD AUTO: 29.5 % (ref 36.5–49.3)
HGB BLD-MCNC: 8.9 G/DL (ref 12–17)
MCH RBC QN AUTO: 24.4 PG (ref 26.8–34.3)
MCHC RBC AUTO-ENTMCNC: 30.2 G/DL (ref 31.4–37.4)
MCV RBC AUTO: 81 FL (ref 82–98)
PLATELET # BLD AUTO: 85 THOUSANDS/UL (ref 149–390)
PMV BLD AUTO: 10.1 FL (ref 8.9–12.7)
POTASSIUM SERPL-SCNC: 3.6 MMOL/L (ref 3.5–5.3)
PROT SERPL-MCNC: 5.7 G/DL (ref 6.4–8.4)
RBC # BLD AUTO: 3.65 MILLION/UL (ref 3.88–5.62)
SODIUM SERPL-SCNC: 140 MMOL/L (ref 135–147)
WBC # BLD AUTO: 3.17 THOUSAND/UL (ref 4.31–10.16)

## 2025-06-25 PROCEDURE — 80053 COMPREHEN METABOLIC PANEL: CPT

## 2025-06-25 PROCEDURE — 82948 REAGENT STRIP/BLOOD GLUCOSE: CPT

## 2025-06-25 PROCEDURE — 72072 X-RAY EXAM THORAC SPINE 3VWS: CPT

## 2025-06-25 PROCEDURE — 93971 EXTREMITY STUDY: CPT

## 2025-06-25 PROCEDURE — 85027 COMPLETE CBC AUTOMATED: CPT

## 2025-06-25 PROCEDURE — 72100 X-RAY EXAM L-S SPINE 2/3 VWS: CPT

## 2025-06-25 PROCEDURE — 99232 SBSQ HOSP IP/OBS MODERATE 35: CPT | Performed by: PHYSICIAN ASSISTANT

## 2025-06-25 PROCEDURE — 93971 EXTREMITY STUDY: CPT | Performed by: SURGERY

## 2025-06-25 RX ORDER — CLONIDINE HYDROCHLORIDE 0.1 MG/1
0.1 TABLET ORAL EVERY 6 HOURS PRN
Status: DISCONTINUED | OUTPATIENT
Start: 2025-06-25 | End: 2025-06-27 | Stop reason: HOSPADM

## 2025-06-25 RX ADMIN — GABAPENTIN 300 MG: 300 CAPSULE ORAL at 08:14

## 2025-06-25 RX ADMIN — GABAPENTIN 300 MG: 300 CAPSULE ORAL at 20:14

## 2025-06-25 RX ADMIN — PANCRELIPASE 36000 UNITS: 120000; 24000; 76000 CAPSULE, DELAYED RELEASE PELLETS ORAL at 15:41

## 2025-06-25 RX ADMIN — BUSPIRONE HYDROCHLORIDE 5 MG: 5 TABLET ORAL at 08:15

## 2025-06-25 RX ADMIN — BUSPIRONE HYDROCHLORIDE 5 MG: 5 TABLET ORAL at 17:30

## 2025-06-25 RX ADMIN — HYDROXYZINE HYDROCHLORIDE 50 MG: 50 TABLET ORAL at 10:09

## 2025-06-25 RX ADMIN — INSULIN LISPRO 1 UNITS: 100 INJECTION, SOLUTION INTRAVENOUS; SUBCUTANEOUS at 17:30

## 2025-06-25 RX ADMIN — PANCRELIPASE 12000 UNITS: 30000; 6000; 19000 CAPSULE, DELAYED RELEASE PELLETS ORAL at 20:13

## 2025-06-25 RX ADMIN — CARVEDILOL 6.25 MG: 6.25 TABLET, FILM COATED ORAL at 08:14

## 2025-06-25 RX ADMIN — ATORVASTATIN CALCIUM 80 MG: 80 TABLET, FILM COATED ORAL at 15:41

## 2025-06-25 RX ADMIN — RIFAXIMIN 550 MG: 550 TABLET ORAL at 08:14

## 2025-06-25 RX ADMIN — PANCRELIPASE 36000 UNITS: 120000; 24000; 76000 CAPSULE, DELAYED RELEASE PELLETS ORAL at 08:19

## 2025-06-25 RX ADMIN — THIAMINE HCL TAB 100 MG 100 MG: 100 TAB at 08:14

## 2025-06-25 RX ADMIN — MORPHINE SULFATE 2 MG: 2 INJECTION, SOLUTION INTRAMUSCULAR; INTRAVENOUS at 01:16

## 2025-06-25 RX ADMIN — CLONIDINE HYDROCHLORIDE 0.1 MG: 0.1 TABLET ORAL at 20:17

## 2025-06-25 RX ADMIN — GABAPENTIN 300 MG: 300 CAPSULE ORAL at 15:41

## 2025-06-25 RX ADMIN — LACTULOSE 30 G: 20 SOLUTION ORAL at 15:41

## 2025-06-25 RX ADMIN — PANCRELIPASE 36000 UNITS: 120000; 24000; 76000 CAPSULE, DELAYED RELEASE PELLETS ORAL at 11:11

## 2025-06-25 RX ADMIN — MORPHINE SULFATE 2 MG: 2 INJECTION, SOLUTION INTRAMUSCULAR; INTRAVENOUS at 05:54

## 2025-06-25 RX ADMIN — CLONIDINE HYDROCHLORIDE 0.1 MG: 0.1 TABLET ORAL at 11:28

## 2025-06-25 RX ADMIN — MULTIPLE VITAMINS W/ MINERALS TAB 1 TABLET: TAB ORAL at 08:15

## 2025-06-25 RX ADMIN — FOLIC ACID 1 MG: 1 TABLET ORAL at 08:15

## 2025-06-25 RX ADMIN — LACTULOSE 30 G: 20 SOLUTION ORAL at 20:14

## 2025-06-25 RX ADMIN — MORPHINE SULFATE 2 MG: 2 INJECTION, SOLUTION INTRAMUSCULAR; INTRAVENOUS at 20:16

## 2025-06-25 RX ADMIN — RIFAXIMIN 550 MG: 550 TABLET ORAL at 20:13

## 2025-06-25 RX ADMIN — FLUOXETINE HYDROCHLORIDE 10 MG: 10 CAPSULE ORAL at 08:15

## 2025-06-25 RX ADMIN — PANTOPRAZOLE SODIUM 40 MG: 40 TABLET, DELAYED RELEASE ORAL at 05:54

## 2025-06-25 RX ADMIN — FENOFIBRATE 145 MG: 145 TABLET, FILM COATED ORAL at 08:14

## 2025-06-25 RX ADMIN — LACTULOSE 30 G: 20 SOLUTION ORAL at 08:15

## 2025-06-25 RX ADMIN — CARVEDILOL 6.25 MG: 6.25 TABLET, FILM COATED ORAL at 15:41

## 2025-06-25 NOTE — ASSESSMENT & PLAN NOTE
Patient endorsing severe anxiety in the setting of acute alcohol withdrawal  Start gabapentin 300 mg TID for post acute withdrawal syndrome   Continue home fluoxetine  Increased dose to 20 mg daily  Continue atarax 50 mg every 6 hours as needed  Clonidine 0.1 mg every 6 hours as needed

## 2025-06-25 NOTE — ASSESSMENT & PLAN NOTE
H/o chronic daily alcohol consumption, denies h/o withdrawal seizures  Last drink: 6/23   Toxicology consulted;   Continue SEWS protocol with symptom-triggered phenobarbital for medically-assisted alcohol withdrawal  Current symptoms include anxiety, headache, nausea   Total phenobarbital: 1365 mg of phenobarbital   Continue to monitor vitals, symptoms  Score 0 x 3 on SEWS, discontinue protocol

## 2025-06-25 NOTE — ASSESSMENT & PLAN NOTE
History of chronic pancreatitis, cirrhosis of liver, hepatic encephalopathy, esophageal varices, and thoracic vertebral fracture transferred here for alcohol detox  Recent admission to the withdrawal management unit in May   Patient reports to relapsing shortly after discharge   Continue thiamine and folic acid supplementation   Patient requesting [facility] rehab upon discharge, JULIET aware

## 2025-06-25 NOTE — ASSESSMENT & PLAN NOTE
Recent imaging with subacute fractures T2-T5  Patient wearing a spinal brace  Endorsing increased back and chest wall tenderness after tractor rollover prior to arrival  Chest x-ray-moderately limited study with unremarkable bones  Will obtain repeat imaging

## 2025-06-25 NOTE — ASSESSMENT & PLAN NOTE
Lab Results   Component Value Date    HGBA1C 5.6 05/12/2025     Recent Labs     06/24/25  1532 06/24/25  2034 06/25/25  0600 06/25/25  1104   POCGLU 101 129 98 115     Prior to admission on Jardiance.  Placed on sliding scale insulin during hospitalization

## 2025-06-25 NOTE — UTILIZATION REVIEW
Initial Clinical Review    Pt initially presented to [facility] ED. Pt was transferred by EMS to [facility] for medically managed detox.    Pt initially admitted as Observation on 6/23. Changed to Inpatient on 6/24. Pt requiring continued stay d/t pain requiring further workup.    Admission: Date/Time/Statement:   Admission Orders (From admission, onward)       Ordered        06/24/25 1642  INPATIENT ADMISSION  Once            06/23/25 0153  Place in Observation  Once                          Orders Placed This Encounter   Procedures    INPATIENT ADMISSION     Standing Status:   Standing     Number of Occurrences:   1     Level of Care:   Med Surg [16]     Estimated length of stay:   More than 2 Midnights     Certification:   I certify that inpatient services are medically necessary for this patient for a duration of greater than two midnights. See H&P and MD Progress Notes for additional information about the patient's course of treatment.        Initial Presentation: 50 y.o. male who presented by EMS to [facility] as a direct admission.  Pertinent PMHx: AUD, HLD, HTN, T2DM, chronic pancreatitis. OBS admission for evaluation and treatment of alcohol withdrawal syndrome. Presented w/ alcohol intoxication. Serum ETOH: 446. Reports 2-3 750 mL bottles of vodka, 2-3 six packs of beer daily, last drink on 6/22 @ 2100. Exam: tremors, anxiety, n/v, restlessness. Lactic 3.1. SEWS: 14. Plan:  IVF, telemetry, continuous pulse ox, SEWS monitoring w/ phenobarbital management, thiamine/folic acid supplement, continue PTA meds, trend labs, replete electrolytes as needed; I&O, fall & seizure precautions. Maintain PTA spinal brace. SSI w/ BG checks ACHS. IV morphine 2mg x1. Toxicology consulted.      Toxicology: Presented w/ need for detox from alcohol.   Plan: SEWS monitoring w/ phenobarbital management, PO thiamine/folic acid supplement. Pt is interested in naltrexone.    Date: 06/24/25        Changed to Inpatient Status: Pt reports severe anxiety. SEWS 0. Plan: continue SEWS monitoring w/ phenobarbital management, continue IVF, PO thiamine/folic acid supplement, telemetry, continuous pulse ox, continue current meds, trend labs, replete electrolytes as needed. Received IV phenobarbital 650 mg x1, 130 mg x5, PO 64.8 mg x1 since admission. Start gabapentin 300 mg TID. Continue PRN Atarax. Check CXR s/t chest wall tenderness. Pt now notes he rolled his tractor yesterday prior to coming to the ED. SSI w/ BG checks ACHS. IV morphine 2 mg x3.     Date: 06/25/25 Day 2: Pt endorses thoracic and lumbar pain as well as chest wall tenderness. Addionally, LUE pain and edema noted on exam. Anxiety. Plan: discontinue SEWS monitoring, thiamine/folic acid, check CXR, LUE duplex to r/o DVT. Continue gabapentin. Increase fluoxetine to 20 mg daily. Continue atarax and clonidine PRN. SSI w/ BG checks ACHS. Continue current meds. Trend labs, replete electrolytes as needed. IV morphine 2 mg x2.        Scheduled Medications:  atorvastatin, 80 mg, Oral, Daily With Dinner  busPIRone, 5 mg, Oral, BID  carvedilol, 6.25 mg, Oral, BID With Meals  fenofibrate, 145 mg, Oral, Daily  [START ON 6/26/2025] FLUoxetine, 20 mg, Oral, Daily  folic acid, 1 mg, Oral, Daily  gabapentin, 300 mg, Oral, TID  insulin lispro, 1-6 Units, Subcutaneous, TID AC  insulin lispro, 1-6 Units, Subcutaneous, HS  lactulose, 30 g, Oral, TID  multivitamin-minerals, 1 tablet, Oral, Daily  pancrelipase (Lip-Prot-Amyl), 12,000 Units, Oral, BID  pancrelipase (Lip-Prot-Amyl), 36,000 Units, Oral, TID AC  pantoprazole, 40 mg, Oral, Early Morning  rifaximin, 550 mg, Oral, Q12H CARRIE  thiamine, 100 mg, Oral, Daily    Continuous IV Infusions:  dextrose 5 % and sodium chloride 0.9 % infusion  Rate: 125 mL/hr Dose: 125 mL/hr  Freq: Continuous Route: IV  Last Dose: Stopped (06/24/25 1137)  Start: 06/23/25 0200 End: 06/24/25 1012    PRN Meds:  albuterol, 2 puff, Inhalation, Q6H  PRN  cloNIDine, 0.1 mg, Oral, Q6H PRN; 6/25 x1  famotidine, 40 mg, Oral, HS PRN  hydrOXYzine HCL, 50 mg, Oral, Q6H PRN; 6/24 x1, 6/25 x1  LORazepam, 0.5 mg Oral, 6/23 x1  morphine injection, 2 mg, Intravenous, Q4H PRN; 6/23 x1, 6/24 x3, 6/25 x2  ondansetron, 4 mg, Intravenous, Q6H PRN; 6/23 x1  traZODone, 50 mg, Oral, HS PRN; 6/23 x1  diazepam, 10 mg, Intravenous; 6/23 x1  phenobarbital, 650 mg, Intravenous; 6/23 x1  phenobarbital, 130 mg, Intravenous; 6/23 x5  phenobarbital, 64.8 mg, Oral; 6/23 x1    FLUoxetine (PROzac) capsule 10 mg  Dose: 10 mg Freq: Daily Route: PO  Start: 06/23/25 0900 End: 06/25/25 1115  hydrOXYzine HCL (ATARAX) tablet 25 mg  Dose: 25 mg Freq: Once Route: PO  Start: 06/23/25 1030 End: 06/23/25 1039       ED Triage Vitals   Temperature Pulse Respirations Blood Pressure SpO2 Pain Score   06/23/25 0140 06/23/25 0140 06/23/25 0140 06/23/25 0140 06/23/25 0140 06/23/25 0143   (!) 97 °F (36.1 °C) (!) 111 18 132/79 92 % 10 - Worst Possible Pain     Weight (last 2 days)       Date/Time Weight    06/23/25 0140 79.1 (174.38)              Vital Signs (last 3 days)       Date/Time Temp Pulse Resp BP MAP (mmHg) SpO2 O2 Device Patient Position - Orthostatic VS SEWS Total Score Pain    06/25/25 1103 98 °F (36.7 °C) 75 17 137/81 99 96 % None (Room air) Lying -- --    06/25/25 0734 97.6 °F (36.4 °C) 75 18 144/90 108 97 % None (Room air) Lying -- --    06/25/25 0554 -- -- -- -- -- -- -- -- -- 9    06/25/25 0116 -- -- -- -- -- -- -- -- -- 9 06/24/25 2313 97.8 °F (36.6 °C) 81 18 145/88 107 96 % None (Room air) Lying -- --    06/24/25 2046 -- -- -- -- -- -- -- -- -- 8 06/24/25 1900 97.9 °F (36.6 °C) 77 18 144/74 -- 97 % None (Room air) Sitting -- --    06/24/25 1650 -- -- -- -- -- -- -- -- -- 9 06/24/25 1530 97.7 °F (36.5 °C) 80 19 148/84 105 95 % None (Room air) Sitting -- --    06/24/25 1148 -- -- -- -- -- -- -- -- -- 9 06/24/25 1101 97.7 °F (36.5 °C) 77 18 146/83 104 93 % None (Room air) Lying -- --     06/24/25 0900 -- -- -- -- -- -- -- -- 0 --    06/24/25 0726 97.6 °F (36.4 °C) 76 18 153/90 111 94 % None (Room air) Lying -- --    06/24/25 0500 -- -- -- -- -- -- -- -- 0 --    06/24/25 0458 97.5 °F (36.4 °C) 75 18 148/88 -- 95 % None (Room air) Lying -- --    06/23/25 2241 98.1 °F (36.7 °C) 81 18 143/75 97 94 % None (Room air) Lying -- --    06/23/25 2109 -- -- -- -- -- -- -- -- 0 --    06/23/25 2100 98.1 °F (36.7 °C) 77 18 139/83 -- 96 % -- -- -- --    06/23/25 2045 -- -- -- -- -- -- -- -- -- 8    06/23/25 1919 -- -- -- -- -- -- -- -- 5 --    06/23/25 1918 98.1 °F (36.7 °C) 85 18 162/82 108 96 % None (Room air) Lying -- --    06/23/25 1657 97.8 °F (36.6 °C) 97 18 140/82 -- 96 % None (Room air) Sitting 8 --    06/23/25 1452 98.5 °F (36.9 °C) 90 18 141/76 97 92 % None (Room air) Sitting 0 --    06/23/25 1338 98.5 °F (36.9 °C) 88 17 134/79 97 92 % None (Room air) Lying 8 --    06/23/25 1215 97.8 °F (36.6 °C) 92 18 136/80 -- 98 % None (Room air) Lying 0 --    06/23/25 1114 98 °F (36.7 °C) 94 18 148/81 103 96 % None (Room air) Sitting 8 --    06/23/25 1000 97.6 °F (36.4 °C) 95 18 120/71 87 98 % None (Room air) Lying -- No Pain    06/23/25 0830 97.5 °F (36.4 °C) 96 18 138/76 -- 97 % -- -- 0 --    06/23/25 0800 97.5 °F (36.4 °C) 98 18 142/80 -- 99 % None (Room air) Lying 7 --    06/23/25 0715 97.5 °F (36.4 °C) 96 17 143/83 103 98 % None (Room air) Lying -- --    06/23/25 0625 -- -- -- -- -- -- -- -- 7 --    06/23/25 0618 97.5 °F (36.4 °C) 102 18 127/75 92 94 % None (Room air) Sitting -- 8    06/23/25 0341 97.8 °F (36.6 °C) 81 18 112/64 80 91 % None (Room air) Sitting -- --    06/23/25 0203 -- -- -- -- -- -- -- -- 14 --    06/23/25 0143 -- -- -- -- -- -- -- -- -- 10 - Worst Possible Pain    06/23/25 0140 97 °F (36.1 °C) 111 18 132/79 96 92 % None (Room air) Sitting -- --            SEWS:   Row Name 06/24/25 0900 06/24/25 0500 06/23/25 2109 06/23/25 1919 06/23/25 1657   Severity of Ethanol Withdrawal Scale (SEWS)    ANXIETY: Do you feel that something bad is about to happen to you right now? 0  -KD 0  -TS 0  -TS 3  -TS 3  -KD   NAUSEA and DRY HEAVES or VOMITING? 0  -KD 0  -TS 0  -TS 0  -TS 0  -KD   SWEATING: (includes moist palms, sweating now)? Score 0 or 2 0  -KD 0  -TS 0  -TS 0  -TS 0  -KD   TREMOR: with arms extended eyes closed? 0  -KD 0  -TS 0  -TS 2  -TS 2  -KD   AGITATION: Fidgety, restless, pacing? 0  -KD 0  -TS 0  -TS 0  -TS 3  -KD   DISORIENTATION: 0  -KD 0  -TS 0  -TS 0  -TS 0  -KD   HALLUCINATIONS: 0  -KD 0  -TS 0  -TS 0  -TS 0  -KD   VITAL SIGNS: ANY (Pulse >110, Diastolic BP >90, Temp >99.6) 0  -KD 0  -TS 0  -TS 0  -TS 0  -KD   SEWS Total Score 0  -KD 0  -TS 0  -TS 5  -TS 8  -KD   Hobbs Agitation Sedation Scale (RASS)   Hobbs Agitation Sedation Scale (RASS) 0  -KD -1  -TS 0  -TS 0  -TS 0  -KD   Row Name 06/23/25 1452 06/23/25 1338 06/23/25 1215 06/23/25 1114 06/23/25 0830   Severity of Ethanol Withdrawal Scale (SEWS)   ANXIETY: Do you feel that something bad is about to happen to you right now? 0  -KD 3  -KD 0  -KD 3  -KD 0  -KD   NAUSEA and DRY HEAVES or VOMITING? 0  -KD 0  -KD 0  -KD 0  -KD 0  -KD   SWEATING: (includes moist palms, sweating now)? Score 0 or 2 0  -KD 0  -KD 0  -KD 0  -KD 0  -KD   TREMOR: with arms extended eyes closed? 0  -KD 2  -KD 0  -KD 2  -KD 0  -KD   AGITATION: Fidgety, restless, pacing? 0  -KD 3  -KD 0  -KD 3  -KD 0  -KD   DISORIENTATION: 0  -KD 0  -KD 0  -KD 0  -KD 0  -KD   HALLUCINATIONS: 0  -KD 0  -KD 0  -KD 0  -KD 0  -KD   VITAL SIGNS: ANY (Pulse >110, Diastolic BP >90, Temp >99.6) 0  -KD 0  -KD 0  -KD 0  -KD 0  -KD   SEWS Total Score 0  -KD 8  -KD 0  -KD 8  -KD 0  -KD   Hobbs Agitation Sedation Scale (RASS)   Hobbs Agitation Sedation Scale (RASS) -1  Pt. is sleeping  -KD 0  -KD 0  -KD 0  -KD 0  -KD   Row Name 06/23/25 0800 06/23/25 0625 06/23/25 0343 06/23/25 0203    Severity of Ethanol Withdrawal Scale (SEWS)   ANXIETY: Do you feel that something bad is about to happen  to you right now? 0  -KD 3  -NT -- 3  -NT    NAUSEA and DRY HEAVES or VOMITING? 0  -KD 0  -NT -- 3  -NT    SWEATING: (includes moist palms, sweating now)? Score 0 or 2 2  -KD 2  -NT -- 0  -NT    TREMOR: with arms extended eyes closed? 2  -KD 2  -NT -- 2  -NT    AGITATION: Fidgety, restless, pacing? 3  -KD 0  -NT -- 3  -NT    DISORIENTATION: 0  -KD 0  -NT -- 0  -NT    HALLUCINATIONS: 0  -KD 0  -NT -- 0  -NT    VITAL SIGNS: ANY (Pulse >110, Diastolic BP >90, Temp >99.6) 0  -KD 0  -NT -- 3  -NT    SEWS Total Score 7  -KD 7  -NT -- 14  -NT    Hobbs Agitation Sedation Scale (RASS)   Hobbs Agitation Sedation Scale (RASS) 0  -KD 0  -NT -2  -NT +1  -NT        Pertinent Labs/Diagnostic Test Results:   Radiology:  XR chest portable   Final Interpretation by Nathen Balderas MD (06/25 0827)      Limited study. There is some platelike atelectasis in the right upper lobe.            Workstation performed: [id number]         XR spine thoracic 3 vw    (Results Pending)   XR spine lumbar 2 or 3 views injury    (Results Pending)   VAS upper limb venous duplex scan, unilateral/limited    (Results Pending)          Results from last 7 days   Lab Units 06/25/25  0554 06/24/25  0506 06/22/25  2132   WBC Thousand/uL 3.17* 3.31* 6.57   HEMOGLOBIN g/dL 8.9* 9.1* 11.2*   HEMATOCRIT % 29.5* 31.7* 37.0   PLATELETS Thousands/uL 85* 97* 181   TOTAL NEUT ABS Thousands/µL  --   --  3.47         Results from last 7 days   Lab Units 06/25/25  0554 06/24/25  0506 06/22/25  2132   SODIUM mmol/L 140 140 147   POTASSIUM mmol/L 3.6 3.5 3.4*   CHLORIDE mmol/L 106 107 110*   CO2 mmol/L 28 26 25   ANION GAP mmol/L 6 7 12   BUN mg/dL 4* 4* 5   CREATININE mg/dL 0.39* 0.37* 0.44*   EGFR ml/min/1.73sq m 137 141 131   CALCIUM mg/dL 8.6 8.2* 8.7   MAGNESIUM mg/dL  --   --  1.6*     Results from last 7 days   Lab Units 06/25/25  0554 06/24/25  0506 06/22/25  2132   AST U/L 32 36 39   ALT U/L 15 15 16   ALK PHOS U/L 93 89 86   TOTAL PROTEIN g/dL 5.7*  5.5* 7.3   ALBUMIN g/dL 3.2* 3.2* 4.0   TOTAL BILIRUBIN mg/dL 0.80 1.16* 0.72     Results from last 7 days   Lab Units 06/25/25  1104 06/25/25  0600 06/24/25  2034 06/24/25  1532 06/24/25  1109 06/24/25  0631 06/23/25  2105 06/23/25  1547 06/23/25  1117 06/23/25  0623 06/22/25 2028   POC GLUCOSE mg/dl 115 98 129 101 152* 137 126 129 118 180* 120     Results from last 7 days   Lab Units 06/25/25  0554 06/24/25  0506 06/22/25  2132   GLUCOSE RANDOM mg/dL 92 127 116      Results from last 7 days   Lab Units 06/22/25  2132   LACTIC ACID mmol/L 3.1*      Results from last 7 days   Lab Units 06/22/25  2132   LIPASE u/L 19      Results from last 7 days   Lab Units 06/22/25  2132   ETHANOL LVL mg/dL 446*          Past Medical History[1]  Present on Admission:   Hypertriglyceridemia   Chronic pancreatitis (HCC)   Chronic gastritis without bleeding   Hypertension   Alcohol use disorder, severe, dependence (HCC)   Alcohol withdrawal (HCC)   Cirrhosis of liver without ascites, unspecified hepatic cirrhosis type (HCC)   Thoracic compression fracture (HCC)      Admitting Diagnosis: Alcohol abuse [F10.10]  Alcohol intoxication (HCC) [F10.929]  Age/Sex: 50 y.o. male      SEWS PHENOBARBITAL PROTOCOL FOR ALCOHOL WITHDRAWAL  Admit patient to medical detox unit and continue supportive care and stabilization of acute ethanol withdrawal per medical toxicology/detox treatment pathway. Monitor ethanol withdrawal severity via the Severity of Ethanol Withdrawal Scale (SEWS) Q4 hours and then hourly if/when SEWS > 6. Treat withdrawal per pathway and reassess Q30-60 minutes.          Mild SEWS Score 1-6  Administer medications* (IV or PO; PO preferred):  If initial SEWS score: diazepam 10mg PO/IV x 1 AND phenobarbital 65 mg PO/IV x 1  If repeat SEWS score 1-6: phenobarbital 65 mg PO/IV q1 hour x 5 doses maximum   Reassessment:   SEWS q1 hour after each dose until SEWS 0 x 2 hours  VS q1 hours (until SEWS 0, then q4 hours)  Notify provider for  bedside evaluation if 5-dose maximum is reached, RASS of -3 to -5, or SEWS score escalates to moderate or severe.   Moderate SEWS Score 7-12  Administer medications* (IV):  If initial SEWS score: diazepam 10mg IV x 1 AND phenobarbital 260 mg IV x 1  If repeat SEWS score 7-12 or score escalated from mild: phenobarbital 130 mg IV q30 minutes x 5 doses maximum   Reassessment:  SEWS q30 minutes after each dose until SEWS < 7 (then hourly until SEWS 0 x 2 hours)  VS q30 minutes until SEWS < 7 (then hourly until SEWS 0, then q4 hours)  Notify provider for bedside evaluation if 5-dose maximum is reached, RASS of -3 to -5, or SEWS score escalates to severe.   Severe SEWS Score >= 13  Administer medications* (IV):  If initial SEWS score: Diazepam 10 mg IV x 1 AND phenobarbital 650 mg IV piggyback x 1 over 15-30 minutes  If repeat SEWS score >= 13 or score escalated from mild or moderate: phenobarbital 130 mg IV q30 minutes x 5 doses maximum   Reassessment:  SEWS q30 minutes after each dose until SEWS < 7 (then hourly until SEWS 0 x 2 hours)   VS q30 minutes until SEWS < 7 (then hourly until SEWS 0, then q4 hours)  Notify provider for bedside evaluation if 5-dose maximum is reached or RASS of -3 to -5   *Hold medications and notify provider if CNS depression, respirations < 10/min, or RASS of -3 to -5.        Network Utilization Review Department  ATTENTION: Please call with any questions or concerns to [phone] and carefully listen to the prompts so that you are directed to the right person. All voicemails are confidential.   For Discharge needs, contact Care Management DC Support Team at [phone] opt. 2  Send all requests for admission clinical reviews, approved or denied determinations and any other requests to dedicated fax number below belonging to the campus where the patient is receiving treatment. List of dedicated fax numbers for the Facilities:  FACILITY NAME UR FAX NUMBER   ADMISSION DENIALS  (Administrative/Medical Necessity) [phone]   DISCHARGE SUPPORT TEAM (NETWORK) [phone]   PARENT CHILD HEALTH (Maternity/NICU/Pediatrics) [phone]   [facility] [phone]   [facility] [phone]   Pending sale to [facility] [phone]   [facility] [phone]   [facility] [phone]   [facility] [phone]   [facility] [phone]   [facility] [phone]   [facility] [phone]   [facility] [phone]   [facility] [phone]   [facility] [phone]              [1]   Past Medical History:  Diagnosis Date    Alcoholism (HCC)     Arthritis     Asthma     Chronic pain disorder     Chronic pancreatitis (HCC)     Cirrhosis (HCC)     early cirrhosis    GERD (gastroesophageal reflux disease)     History of COVID-19 01/2022    mild s/s    Hyperlipidemia     Hypertension     Liver abscess     Liver disease     Meniscus tear     left knee work injury last assessed 08/24/2016    Pancreatitis     Pneumonia     Rotator cuff tear     Stomach disorder     Chronic gastritus

## 2025-06-25 NOTE — ASSESSMENT & PLAN NOTE
Endorsing LUE pain, swelling.  LUE edema > RUE.  Will obtain upper extremity duplex for DVT rule out

## 2025-06-25 NOTE — PLAN OF CARE
Problem: PAIN - ADULT  Goal: Verbalizes/displays adequate comfort level or baseline comfort level  Description: Interventions:  - Encourage patient to monitor pain and request assistance  - Assess pain using appropriate pain scale  - Administer analgesics as ordered based on type and severity of pain and evaluate response  - Implement non-pharmacological measures as appropriate and evaluate response  - Consider cultural and social influences on pain and pain management  - Notify physician/advanced practitioner if interventions unsuccessful or patient reports new pain  - Educate patient on pain management process including their role and importance of  reporting pain   - Provide non-pharmacologic/complimentary pain relief interventions  Outcome: Progressing     Problem: SAFETY ADULT  Goal: Patient will remain free of falls  Description: INTERVENTIONS:  - Educate patient on patient safety including physical limitations  - Instruct patient to call for assistance with activity   - Keep Call bell within reach  - Keep bed low and locked with side rails adjusted as appropriate  - Keep care items and personal belongings within reach  - Initiate and maintain comfort rounds  - Make Fall Risk Sign visible to staff  - Initiate/Maintain chair alarm  - Apply yellow socks and bracelet for high fall risk patients  - Consider moving patient to room near nurses station  Outcome: Progressing  Goal: Maintain or return to baseline ADL function  Description: INTERVENTIONS:  -  Assess patient's ability to carry out ADLs; assess patient's baseline for ADL function and identify physical deficits which impact ability to perform ADLs (bathing, care of mouth/teeth, toileting, grooming, dressing, etc.)  - Assess/evaluate cause of self-care deficits   - Assess range of motion  - Assess patient's mobility; develop plan if impaired  - Assess patient's need for assistive devices and provide as appropriate  - Provide patient education as  appropriate  - Monitor functional capacity and physical performance, use of AM PAC & JH-HLM   - Monitor gait, balance and fatigue with ambulation    Outcome: Progressing  Goal: Maintains/Returns to pre admission functional level  Description: INTERVENTIONS:  - Perform AM-PAC 6 Click Basic Mobility/ Daily Activity assessment daily.  - Set and communicate daily mobility goal to care team and patient.   - Collaborate with rehabilitation services on mobility goals if consulted  - Out of bed for toileting  - Record patient progress and toleration of activity level   Outcome: Progressing     Problem: DISCHARGE PLANNING  Goal: Discharge to home or other facility with appropriate resources  Description: INTERVENTIONS:  - Identify barriers to discharge w/patient  - Arrange for needed discharge resources and transportation as appropriate  - Identify discharge learning needs (meds, wound care, etc.)  - Arrange for interpretive services to assist at discharge as needed  - Refer to Case Management Department for coordinating discharge planning if the patient needs post-hospital services based on physician/advanced practitioner order or complex needs related to functional status, cognitive ability, or social support system  Outcome: Progressing     Problem: Knowledge Deficit  Goal: Patient/family/caregiver demonstrates understanding of disease process, treatment plan, medications, and discharge instructions  Description: Complete learning assessment and assess knowledge base.  Interventions:  - Provide teaching at level of understanding  - Provide teaching via preferred learning methods  Outcome: Progressing     Problem: SUBSTANCE USE/ABUSE  Goal: By discharge, will develop insight into their chemical dependency and sustain motivation to continue in recovery  Description: INTERVENTIONS:  - Attends all daily group sessions and scheduled AA groups  - Actively practices coping skills through participation in the therapeutic  community and adherence to program rules  - Reviews and completes assignments from individual treatment plan  - Assist patient development of understanding of their personal cycle of addiction and relapse triggers  Outcome: Progressing  Goal: By discharge, patient will have ongoing treatment plan addressing chemical dependency  Description: INTERVENTIONS:  - Assist patient with resources and/or appointments for ongoing recovery based living  Outcome: Progressing

## 2025-06-25 NOTE — CERTIFIED RECOVERY SPECIALIST
Certified  Note      Name: Ashwin Wright 50 y.o. male I MRN: [id number]  Unit/Bed#: 5T DETOX 512-01 I Date of Admission: 6/23/2025   Date of Service: 6/25/2025 I Hospital Day: 2    Summary of Contact   CRS checked in with patient - expected Dc tomorrow and then planning on IP at [place].     Patient reports that he is still very anxious    Follow up: NA     Administrative Statements  I have spent a total time of 10 minutes in caring for this patient on the day of the visit/encounter.    Kelli Felder
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
## 2025-06-25 NOTE — ASSESSMENT & PLAN NOTE
Follows with gastroenterology as an outpatient.    Continue carvedilol for history of varices  Hgb stable, no evidence of bleeding  Continue to monitor CBC daily

## 2025-06-26 PROBLEM — D61.818 PANCYTOPENIA (HCC): Status: ACTIVE | Noted: 2018-04-18

## 2025-06-26 LAB
ANION GAP SERPL CALCULATED.3IONS-SCNC: 6 MMOL/L (ref 4–13)
BUN SERPL-MCNC: 4 MG/DL (ref 5–25)
CALCIUM SERPL-MCNC: 8.8 MG/DL (ref 8.4–10.2)
CHLORIDE SERPL-SCNC: 105 MMOL/L (ref 96–108)
CO2 SERPL-SCNC: 28 MMOL/L (ref 21–32)
CREAT SERPL-MCNC: 0.44 MG/DL (ref 0.6–1.3)
ERYTHROCYTE [DISTWIDTH] IN BLOOD BY AUTOMATED COUNT: 22.7 % (ref 11.6–15.1)
GFR SERPL CREATININE-BSD FRML MDRD: 131 ML/MIN/1.73SQ M
GLUCOSE SERPL-MCNC: 111 MG/DL (ref 65–140)
GLUCOSE SERPL-MCNC: 126 MG/DL (ref 65–140)
GLUCOSE SERPL-MCNC: 135 MG/DL (ref 65–140)
GLUCOSE SERPL-MCNC: 146 MG/DL (ref 65–140)
GLUCOSE SERPL-MCNC: 159 MG/DL (ref 65–140)
HCT VFR BLD AUTO: 28.9 % (ref 36.5–49.3)
HGB BLD-MCNC: 8.8 G/DL (ref 12–17)
MCH RBC QN AUTO: 24.6 PG (ref 26.8–34.3)
MCHC RBC AUTO-ENTMCNC: 30.4 G/DL (ref 31.4–37.4)
MCV RBC AUTO: 81 FL (ref 82–98)
PLATELET # BLD AUTO: 90 THOUSANDS/UL (ref 149–390)
PMV BLD AUTO: 10.5 FL (ref 8.9–12.7)
POTASSIUM SERPL-SCNC: 3.7 MMOL/L (ref 3.5–5.3)
RBC # BLD AUTO: 3.58 MILLION/UL (ref 3.88–5.62)
SODIUM SERPL-SCNC: 139 MMOL/L (ref 135–147)
WBC # BLD AUTO: 3.46 THOUSAND/UL (ref 4.31–10.16)

## 2025-06-26 PROCEDURE — 82948 REAGENT STRIP/BLOOD GLUCOSE: CPT

## 2025-06-26 PROCEDURE — 99232 SBSQ HOSP IP/OBS MODERATE 35: CPT

## 2025-06-26 PROCEDURE — 85027 COMPLETE CBC AUTOMATED: CPT | Performed by: PHYSICIAN ASSISTANT

## 2025-06-26 PROCEDURE — 80048 BASIC METABOLIC PNL TOTAL CA: CPT | Performed by: PHYSICIAN ASSISTANT

## 2025-06-26 RX ORDER — PANTOPRAZOLE SODIUM 40 MG/1
40 TABLET, DELAYED RELEASE ORAL
Qty: 7 TABLET | Refills: 0 | Status: SHIPPED | OUTPATIENT
Start: 2025-06-27 | End: 2025-06-26

## 2025-06-26 RX ORDER — ROSUVASTATIN CALCIUM 40 MG/1
40 TABLET, COATED ORAL DAILY
Qty: 90 TABLET | Refills: 0 | Status: SHIPPED | OUTPATIENT
Start: 2025-06-26 | End: 2025-07-03

## 2025-06-26 RX ORDER — FENOFIBRATE 145 MG/1
145 TABLET, FILM COATED ORAL DAILY
Qty: 7 TABLET | Refills: 0 | Status: SHIPPED | OUTPATIENT
Start: 2025-06-27 | End: 2025-07-04

## 2025-06-26 RX ORDER — OXYCODONE HYDROCHLORIDE 5 MG/1
5 TABLET ORAL EVERY 6 HOURS PRN
Refills: 0 | Status: DISCONTINUED | OUTPATIENT
Start: 2025-06-26 | End: 2025-06-27 | Stop reason: HOSPADM

## 2025-06-26 RX ORDER — CARVEDILOL 6.25 MG/1
6.25 TABLET ORAL 2 TIMES DAILY WITH MEALS
Qty: 14 TABLET | Refills: 0 | Status: SHIPPED | OUTPATIENT
Start: 2025-06-26 | End: 2025-07-03

## 2025-06-26 RX ORDER — PANTOPRAZOLE SODIUM 40 MG/1
40 TABLET, DELAYED RELEASE ORAL
Qty: 7 TABLET | Refills: 0 | Status: SHIPPED | OUTPATIENT
Start: 2025-06-27 | End: 2025-07-04

## 2025-06-26 RX ORDER — ROSUVASTATIN CALCIUM 40 MG/1
40 TABLET, COATED ORAL DAILY
Qty: 90 TABLET | Refills: 0 | Status: SHIPPED | OUTPATIENT
Start: 2025-06-26 | End: 2025-06-26

## 2025-06-26 RX ORDER — GABAPENTIN 300 MG/1
300 CAPSULE ORAL 3 TIMES DAILY
Qty: 21 CAPSULE | Refills: 0 | Status: SHIPPED | OUTPATIENT
Start: 2025-06-26 | End: 2025-07-03

## 2025-06-26 RX ORDER — HYDROXYZINE HYDROCHLORIDE 50 MG/1
50 TABLET, FILM COATED ORAL EVERY 6 HOURS PRN
Qty: 30 TABLET | Refills: 0 | Status: SHIPPED | OUTPATIENT
Start: 2025-06-26 | End: 2025-06-26

## 2025-06-26 RX ORDER — GABAPENTIN 300 MG/1
300 CAPSULE ORAL 3 TIMES DAILY
Qty: 21 CAPSULE | Refills: 0 | Status: SHIPPED | OUTPATIENT
Start: 2025-06-26 | End: 2025-06-26

## 2025-06-26 RX ORDER — CARVEDILOL 6.25 MG/1
6.25 TABLET ORAL 2 TIMES DAILY WITH MEALS
Qty: 14 TABLET | Refills: 0 | Status: SHIPPED | OUTPATIENT
Start: 2025-06-26 | End: 2025-06-26

## 2025-06-26 RX ORDER — FENOFIBRATE 145 MG/1
145 TABLET, FILM COATED ORAL DAILY
Qty: 7 TABLET | Refills: 0 | Status: SHIPPED | OUTPATIENT
Start: 2025-06-27 | End: 2025-06-26

## 2025-06-26 RX ORDER — HYDROXYZINE HYDROCHLORIDE 50 MG/1
50 TABLET, FILM COATED ORAL EVERY 6 HOURS PRN
Qty: 30 TABLET | Refills: 0 | Status: SHIPPED | OUTPATIENT
Start: 2025-06-26 | End: 2025-07-04

## 2025-06-26 RX ADMIN — PANCRELIPASE 36000 UNITS: 120000; 24000; 76000 CAPSULE, DELAYED RELEASE PELLETS ORAL at 10:41

## 2025-06-26 RX ADMIN — CLONIDINE HYDROCHLORIDE 0.1 MG: 0.1 TABLET ORAL at 22:33

## 2025-06-26 RX ADMIN — PANCRELIPASE 36000 UNITS: 120000; 24000; 76000 CAPSULE, DELAYED RELEASE PELLETS ORAL at 06:24

## 2025-06-26 RX ADMIN — MORPHINE SULFATE 2 MG: 2 INJECTION, SOLUTION INTRAMUSCULAR; INTRAVENOUS at 00:43

## 2025-06-26 RX ADMIN — GABAPENTIN 300 MG: 300 CAPSULE ORAL at 08:23

## 2025-06-26 RX ADMIN — CLONIDINE HYDROCHLORIDE 0.1 MG: 0.1 TABLET ORAL at 16:33

## 2025-06-26 RX ADMIN — LACTULOSE 30 G: 20 SOLUTION ORAL at 08:22

## 2025-06-26 RX ADMIN — GABAPENTIN 300 MG: 300 CAPSULE ORAL at 21:24

## 2025-06-26 RX ADMIN — PANCRELIPASE 36000 UNITS: 120000; 24000; 76000 CAPSULE, DELAYED RELEASE PELLETS ORAL at 16:33

## 2025-06-26 RX ADMIN — MULTIPLE VITAMINS W/ MINERALS TAB 1 TABLET: TAB ORAL at 08:22

## 2025-06-26 RX ADMIN — PANTOPRAZOLE SODIUM 40 MG: 40 TABLET, DELAYED RELEASE ORAL at 06:25

## 2025-06-26 RX ADMIN — LACTULOSE 30 G: 20 SOLUTION ORAL at 21:24

## 2025-06-26 RX ADMIN — ATORVASTATIN CALCIUM 80 MG: 80 TABLET, FILM COATED ORAL at 16:33

## 2025-06-26 RX ADMIN — OXYCODONE HYDROCHLORIDE 5 MG: 5 TABLET ORAL at 12:38

## 2025-06-26 RX ADMIN — THIAMINE HCL TAB 100 MG 100 MG: 100 TAB at 08:23

## 2025-06-26 RX ADMIN — OXYCODONE HYDROCHLORIDE 5 MG: 5 TABLET ORAL at 19:23

## 2025-06-26 RX ADMIN — FLUOXETINE HYDROCHLORIDE 20 MG: 20 CAPSULE ORAL at 08:23

## 2025-06-26 RX ADMIN — FOLIC ACID 1 MG: 1 TABLET ORAL at 08:23

## 2025-06-26 RX ADMIN — CLONIDINE HYDROCHLORIDE 0.1 MG: 0.1 TABLET ORAL at 03:40

## 2025-06-26 RX ADMIN — LACTULOSE 30 G: 20 SOLUTION ORAL at 16:33

## 2025-06-26 RX ADMIN — RIFAXIMIN 550 MG: 550 TABLET ORAL at 21:24

## 2025-06-26 RX ADMIN — BUSPIRONE HYDROCHLORIDE 5 MG: 5 TABLET ORAL at 08:23

## 2025-06-26 RX ADMIN — MORPHINE SULFATE 2 MG: 2 INJECTION, SOLUTION INTRAMUSCULAR; INTRAVENOUS at 08:22

## 2025-06-26 RX ADMIN — CARVEDILOL 6.25 MG: 6.25 TABLET, FILM COATED ORAL at 08:23

## 2025-06-26 RX ADMIN — GABAPENTIN 300 MG: 300 CAPSULE ORAL at 16:33

## 2025-06-26 RX ADMIN — INSULIN LISPRO 1 UNITS: 100 INJECTION, SOLUTION INTRAVENOUS; SUBCUTANEOUS at 06:26

## 2025-06-26 RX ADMIN — CARVEDILOL 6.25 MG: 6.25 TABLET, FILM COATED ORAL at 16:32

## 2025-06-26 RX ADMIN — BUSPIRONE HYDROCHLORIDE 5 MG: 5 TABLET ORAL at 17:31

## 2025-06-26 RX ADMIN — FENOFIBRATE 145 MG: 145 TABLET, FILM COATED ORAL at 08:23

## 2025-06-26 RX ADMIN — PANCRELIPASE 12000 UNITS: 30000; 6000; 19000 CAPSULE, DELAYED RELEASE PELLETS ORAL at 21:25

## 2025-06-26 RX ADMIN — CLONIDINE HYDROCHLORIDE 0.1 MG: 0.1 TABLET ORAL at 10:41

## 2025-06-26 RX ADMIN — RIFAXIMIN 550 MG: 550 TABLET ORAL at 08:23

## 2025-06-26 NOTE — SOCIAL WORK
[facility] in [place], [place] per pt. Request. Reviewing. CM will follow.
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
## 2025-06-26 NOTE — ASSESSMENT & PLAN NOTE
History of chronic pancreatitis, cirrhosis of liver, hepatic encephalopathy, esophageal varices, and thoracic vertebral fracture transferred here for alcohol detox  Recent admission to the withdrawal management unit in May   Patient reports to relapsing shortly after discharge   Continue thiamine and folic acid supplementation   Patient requesting [facility] rehab upon discharge, JULIET aware
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
## 2025-06-26 NOTE — ASSESSMENT & PLAN NOTE
Lab Results   Component Value Date    HGBA1C 5.6 05/12/2025     Recent Labs     06/25/25  1607 06/25/25  2110 06/26/25  0613 06/26/25  1123   POCGLU 155* 109 159* 135     Prior to admission on Jardiance.  Placed on sliding scale insulin during hospitalization

## 2025-06-26 NOTE — UTILIZATION REVIEW
NOTIFICATION OF INPATIENT MEDICAL ADMISSION   AUTHORIZATION REQUEST   SERVICING FACILITY:   [address] [address]  Tax ID: [id number]  NPI: [id number] ATTENDING PROVIDER:  Attending Name and NPI#: Kirill Joseph Do [[id number]]  Address: [address] [place] [address]  Phone: [phone]     ADMISSION INFORMATION:  Place of Service: Inpatient [facility]  Place of Service Code: 21  Inpatient Admission Date/Time: 6/23/25  1:24 AM  Discharge Date/Time: No discharge date for patient encounter.  Admitting Diagnosis Code/Description:  Alcohol abuse [F10.10]  Alcohol intoxication (HCC) [F10.929]     UTILIZATION REVIEW CONTACT:  Kallie Cho, Utilization   Network Utilization Review Department  Phone: [phone] Fax: [phone]  Email: [email or web address]  Contact for approvals/pending authorizations, clinical reviews, and discharge.     PHYSICIAN ADVISORY SERVICES:  Medical Necessity Denial & Ysif-sx-Lnjv Review  Phone: [phone]  Fax: [phone]  Email: [email or web address]     DISCHARGE SUPPORT TEAM:  For Patients Discharge Needs & Updates  Phone: [phone] opt. 2 Fax: [phone]  Email: [email or web address]

## 2025-06-26 NOTE — PLAN OF CARE
Problem: PAIN - ADULT  Goal: Verbalizes/displays adequate comfort level or baseline comfort level  Description: Interventions:  - Encourage patient to monitor pain and request assistance  - Assess pain using appropriate pain scale  - Administer analgesics as ordered based on type and severity of pain and evaluate response  - Implement non-pharmacological measures as appropriate and evaluate response  - Consider cultural and social influences on pain and pain management  - Notify physician/advanced practitioner if interventions unsuccessful or patient reports new pain  - Educate patient on pain management process including their role and importance of  reporting pain   - Provide non-pharmacologic/complimentary pain relief interventions  Outcome: Progressing     Problem: SAFETY ADULT  Goal: Patient will remain free of falls  Description: INTERVENTIONS:  - Educate patient on patient safety including physical limitations  - Instruct patient to call for assistance with activity   - Keep Call bell within reach  - Keep bed low and locked with side rails adjusted as appropriate  - Keep care items and personal belongings within reach  - Initiate and maintain comfort rounds  - Make Fall Risk Sign visible to staff  - Initiate/Maintain chair alarm  - Apply yellow socks and bracelet for high fall risk patients  - Consider moving patient to room near nurses station  Outcome: Progressing  Goal: Maintain or return to baseline ADL function  Description: INTERVENTIONS:  -  Assess patient's ability to carry out ADLs; assess patient's baseline for ADL function and identify physical deficits which impact ability to perform ADLs (bathing, care of mouth/teeth, toileting, grooming, dressing, etc.)  - Assess/evaluate cause of self-care deficits   - Assess range of motion  - Assess patient's mobility; develop plan if impaired  - Assess patient's need for assistive devices and provide as appropriate  - Provide patient education as  appropriate  - Monitor functional capacity and physical performance, use of AM PAC & JH-HLM   - Monitor gait, balance and fatigue with ambulation    Outcome: Progressing  Goal: Maintains/Returns to pre admission functional level  Description: INTERVENTIONS:  - Perform AM-PAC 6 Click Basic Mobility/ Daily Activity assessment daily.  - Set and communicate daily mobility goal to care team and patient.   - Collaborate with rehabilitation services on mobility goals if consulted  - Out of bed for toileting  - Record patient progress and toleration of activity level   Outcome: Progressing     Problem: SUBSTANCE USE/ABUSE  Goal: By discharge, will develop insight into their chemical dependency and sustain motivation to continue in recovery  Description: INTERVENTIONS:  - Attends all daily group sessions and scheduled AA groups  - Actively practices coping skills through participation in the therapeutic community and adherence to program rules  - Reviews and completes assignments from individual treatment plan  - Assist patient development of understanding of their personal cycle of addiction and relapse triggers  Outcome: Progressing  Goal: By discharge, patient will have ongoing treatment plan addressing chemical dependency  Description: INTERVENTIONS:  - Assist patient with resources and/or appointments for ongoing recovery based living  Outcome: Progressing

## 2025-06-26 NOTE — SOCIAL WORK
Pt was declined by [facility], pt's preferred [facility] treatment facility, d/t medical complexity. CM did make pt aware and provided several other in-network options. Pt stated he'd have to talk to his wife to discuss, wasn't sure what other facilities he might prefer. CM will follow.    Gabapentin Counseling: I discussed with the patient the risks of gabapentin including but not limited to dizziness, somnolence, fatigue and ataxia.

## 2025-06-26 NOTE — PLAN OF CARE
Problem: PAIN - ADULT  Goal: Verbalizes/displays adequate comfort level or baseline comfort level  Description: Interventions:  - Encourage patient to monitor pain and request assistance  - Assess pain using appropriate pain scale  - Administer analgesics as ordered based on type and severity of pain and evaluate response  - Implement non-pharmacological measures as appropriate and evaluate response  - Consider cultural and social influences on pain and pain management  - Notify physician/advanced practitioner if interventions unsuccessful or patient reports new pain  - Educate patient on pain management process including their role and importance of  reporting pain   - Provide non-pharmacologic/complimentary pain relief interventions  Outcome: Progressing     Problem: SAFETY ADULT  Goal: Patient will remain free of falls  Description: INTERVENTIONS:  - Educate patient on patient safety including physical limitations  - Instruct patient to call for assistance with activity   - Keep Call bell within reach  - Keep bed low and locked with side rails adjusted as appropriate  - Keep care items and personal belongings within reach  - Initiate and maintain comfort rounds  - Make Fall Risk Sign visible to staff  - Initiate/Maintain chair alarm  - Apply yellow socks and bracelet for high fall risk patients  - Consider moving patient to room near nurses station  Outcome: Progressing  Goal: Maintain or return to baseline ADL function  Description: INTERVENTIONS:  -  Assess patient's ability to carry out ADLs; assess patient's baseline for ADL function and identify physical deficits which impact ability to perform ADLs (bathing, care of mouth/teeth, toileting, grooming, dressing, etc.)  - Assess/evaluate cause of self-care deficits   - Assess range of motion  - Assess patient's mobility; develop plan if impaired  - Assess patient's need for assistive devices and provide as appropriate  - Provide patient education as  appropriate  - Monitor functional capacity and physical performance, use of AM PAC & JH-HLM   - Monitor gait, balance and fatigue with ambulation    Outcome: Progressing  Goal: Maintains/Returns to pre admission functional level  Description: INTERVENTIONS:  - Perform AM-PAC 6 Click Basic Mobility/ Daily Activity assessment daily.  - Set and communicate daily mobility goal to care team and patient.   - Collaborate with rehabilitation services on mobility goals if consulted  - Out of bed for toileting  - Record patient progress and toleration of activity level   Outcome: Progressing     Problem: DISCHARGE PLANNING  Goal: Discharge to home or other facility with appropriate resources  Description: INTERVENTIONS:  - Identify barriers to discharge w/patient  - Arrange for needed discharge resources and transportation as appropriate  - Identify discharge learning needs (meds, wound care, etc.)  - Arrange for interpretive services to assist at discharge as needed  - Refer to Case Management Department for coordinating discharge planning if the patient needs post-hospital services based on physician/advanced practitioner order or complex needs related to functional status, cognitive ability, or social support system  Outcome: Progressing     Problem: Knowledge Deficit  Goal: Patient/family/caregiver demonstrates understanding of disease process, treatment plan, medications, and discharge instructions  Description: Complete learning assessment and assess knowledge base.  Interventions:  - Provide teaching at level of understanding  - Provide teaching via preferred learning methods  Outcome: Progressing     Problem: SUBSTANCE USE/ABUSE  Goal: By discharge, will develop insight into their chemical dependency and sustain motivation to continue in recovery  Description: INTERVENTIONS:  - Attends all daily group sessions and scheduled AA groups  - Actively practices coping skills through participation in the therapeutic  community and adherence to program rules  - Reviews and completes assignments from individual treatment plan  - Assist patient development of understanding of their personal cycle of addiction and relapse triggers  Outcome: Progressing  Goal: By discharge, patient will have ongoing treatment plan addressing chemical dependency  Description: INTERVENTIONS:  - Assist patient with resources and/or appointments for ongoing recovery based living  Outcome: Progressing      Opt out

## 2025-06-26 NOTE — CERTIFIED RECOVERY SPECIALIST
Certified  Note      Name: Ashwin Wright 50 y.o. male I MRN: [id number]  Unit/Bed#: 5T DETOX 512-01 I Date of Admission: 6/23/2025   Date of Service: 6/26/2025 I Hospital Day: 3    Summary of Contact  Patient plan to go to [place] from hospital. Patient has good outlook on recovery and open to suggestions.     CRS encouraged alcohol cessation and recovery support     Follow up: NA     Administrative Statements  I have spent a total time of 15 minutes in caring for this patient on the day of the visit/encounter.    Kelli Felder

## 2025-06-26 NOTE — ASSESSMENT & PLAN NOTE
Pancytopenia not present on admission secondary to alcoholism    Results from last 7 days   Lab Units 06/26/25  0459 06/25/25  0554 06/24/25  0506   WBC Thousand/uL 3.46* 3.17* 3.31*   HEMOGLOBIN g/dL 8.8* 8.9* 9.1*   PLATELETS Thousands/uL 90* 85* 97*

## 2025-06-27 VITALS
BODY MASS INDEX: 24.41 KG/M2 | HEIGHT: 71 IN | SYSTOLIC BLOOD PRESSURE: 120 MMHG | OXYGEN SATURATION: 95 % | DIASTOLIC BLOOD PRESSURE: 71 MMHG | RESPIRATION RATE: 18 BRPM | TEMPERATURE: 97.6 F | WEIGHT: 174.38 LBS | HEART RATE: 62 BPM

## 2025-06-27 PROBLEM — F10.939 ALCOHOL WITHDRAWAL (HCC): Status: RESOLVED | Noted: 2025-05-15 | Resolved: 2025-06-27

## 2025-06-27 LAB
GLUCOSE SERPL-MCNC: 116 MG/DL (ref 65–140)
GLUCOSE SERPL-MCNC: 143 MG/DL (ref 65–140)

## 2025-06-27 PROCEDURE — 82948 REAGENT STRIP/BLOOD GLUCOSE: CPT

## 2025-06-27 PROCEDURE — 99239 HOSP IP/OBS DSCHRG MGMT >30: CPT

## 2025-06-27 RX ADMIN — GABAPENTIN 300 MG: 300 CAPSULE ORAL at 08:12

## 2025-06-27 RX ADMIN — RIFAXIMIN 550 MG: 550 TABLET ORAL at 08:13

## 2025-06-27 RX ADMIN — CLONIDINE HYDROCHLORIDE 0.1 MG: 0.1 TABLET ORAL at 06:15

## 2025-06-27 RX ADMIN — FENOFIBRATE 145 MG: 145 TABLET, FILM COATED ORAL at 08:13

## 2025-06-27 RX ADMIN — PANTOPRAZOLE SODIUM 40 MG: 40 TABLET, DELAYED RELEASE ORAL at 06:15

## 2025-06-27 RX ADMIN — FLUOXETINE HYDROCHLORIDE 20 MG: 20 CAPSULE ORAL at 08:12

## 2025-06-27 RX ADMIN — LACTULOSE 30 G: 20 SOLUTION ORAL at 08:12

## 2025-06-27 RX ADMIN — MULTIPLE VITAMINS W/ MINERALS TAB 1 TABLET: TAB ORAL at 08:12

## 2025-06-27 RX ADMIN — BUSPIRONE HYDROCHLORIDE 5 MG: 5 TABLET ORAL at 08:13

## 2025-06-27 RX ADMIN — OXYCODONE HYDROCHLORIDE 5 MG: 5 TABLET ORAL at 06:15

## 2025-06-27 RX ADMIN — PANCRELIPASE 36000 UNITS: 120000; 24000; 76000 CAPSULE, DELAYED RELEASE PELLETS ORAL at 06:13

## 2025-06-27 RX ADMIN — PANCRELIPASE 36000 UNITS: 120000; 24000; 76000 CAPSULE, DELAYED RELEASE PELLETS ORAL at 11:49

## 2025-06-27 RX ADMIN — CARVEDILOL 6.25 MG: 6.25 TABLET, FILM COATED ORAL at 08:13

## 2025-06-27 RX ADMIN — THIAMINE HCL TAB 100 MG 100 MG: 100 TAB at 08:12

## 2025-06-27 RX ADMIN — CLONIDINE HYDROCHLORIDE 0.1 MG: 0.1 TABLET ORAL at 12:05

## 2025-06-27 RX ADMIN — FOLIC ACID 1 MG: 1 TABLET ORAL at 08:12

## 2025-06-27 NOTE — ASSESSMENT & PLAN NOTE
H/o chronic daily alcohol consumption, denies h/o withdrawal seizures  Last drink: 6/23   Toxicology consulted;   Discontinue SEWS protocol with symptom-triggered phenobarbital for medically-assisted alcohol withdrawal  Total phenobarbital: 1365 mg of phenobarbital

## 2025-06-27 NOTE — ASSESSMENT & PLAN NOTE
History of chronic pancreatitis, cirrhosis of liver, hepatic encephalopathy, esophageal varices, and thoracic vertebral fracture transferred here for alcohol detox  Recent admission to the withdrawal management unit in May   Patient reports to relapsing shortly after discharge   Continue thiamine and folic acid supplementation   Patient requesting [place] rehab upon discharge, JULIET aware
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
## 2025-06-27 NOTE — ASSESSMENT & PLAN NOTE
Lab Results   Component Value Date    HGBA1C 5.6 05/12/2025     Recent Labs     06/26/25  1123 06/26/25  1522 06/26/25  2114 06/27/25  0611   POCGLU 135 111 146* 116     Prior to admission on Jardiance.  Placed on sliding scale insulin during hospitalization

## 2025-06-27 NOTE — CERTIFIED RECOVERY SPECIALIST
Certified  Note      Name: Ashwin Wright 50 y.o. male I MRN: [id number]  Unit/Bed#: 5T DETOX 512-01 I Date of Admission: 6/23/2025   Date of Service: 6/27/2025 I Hospital Day: 4    Summary of Contact   Patient frustrated declined at [place]. CM actively looking for other IP options for patient.     Patient stated that he plans on leaving today if IP bed not found     Follow up: NA       Administrative Statements  I have spent a total time of 10 minutes in caring for this patient on the day of the visit/encounter.    Kelli Felder

## 2025-06-30 ENCOUNTER — TELEPHONE (OUTPATIENT)
Age: 50
End: 2025-06-30

## 2025-06-30 DIAGNOSIS — F10.20 ALCOHOL USE DISORDER, SEVERE, DEPENDENCE (HCC): ICD-10-CM

## 2025-06-30 DIAGNOSIS — Z78.9 NEED FOR FOLLOW-UP BY SOCIAL WORKER: Primary | ICD-10-CM

## 2025-06-30 NOTE — UTILIZATION REVIEW
NOTIFICATION OF ADMISSION DISCHARGE   This is a Notification of Discharge from [facility]. Please be advised that this patient has been discharge from our facility. Below you will find the admission and discharge date and time including the patient’s disposition.   UTILIZATION REVIEW CONTACT:  Utilization Review Assistants  Network Utilization Review Department  Phone: [phone] x carefully listen to the prompts. All voicemails are confidential.  Email: [email or web address]     ADMISSION INFORMATION  PRESENTATION DATE: 6/23/2025  1:24 AM  OBERVATION ADMISSION DATE: 06/23/2025 0153  INPATIENT ADMISSION DATE: 6/23/25  1:24 AM   DISCHARGE DATE: 6/27/2025  1:49 PM   DISPOSITION:Home/Self Care    Network Utilization Review Department  ATTENTION: Please call with any questions or concerns to [phone] and carefully listen to the prompts so that you are directed to the right person. All voicemails are confidential.   For Discharge needs, contact Care Management DC Support Team at [phone] opt. 2  Send all requests for admission clinical reviews, approved or denied determinations and any other requests to dedicated fax number below belonging to the campus where the patient is receiving treatment. List of dedicated fax numbers for the Facilities:  FACILITY NAME UR FAX NUMBER   ADMISSION DENIALS (Administrative/Medical Necessity) [phone]   DISCHARGE SUPPORT TEAM ([facility]) [phone]   PARENT CHILD HEALTH (Maternity/NICU/Pediatrics) [phone]   [facility] [phone]   [facility] [phone]   [facility] [phone]   [facility] [phone]   [facility] [phone]   [facility] [phone]   [facility] [phone]   [facility] [phone]    [facility] [phone]   [facility] [phone]   [facility] [phone]   [facility] [phone]

## 2025-06-30 NOTE — PROGRESS NOTES
06/30/25 1012   Other Referral/Resources/Interventions Provided:   Financial Resources Provided Indigent Medication  (Indigent medications provided in the amount of $1. Wife provided transportation to IP AUD tx upon discharge.)   Referrals Provided: Other (Specify);AuntBertha;Crisis Hotline;Support Group;Therapist;Psychiatrist  (IP and OP AUD tx resources)   Post Acute Placement to: Substance Abuse Treatment  (Pt was referred to [facility] for IP AUD tx)   Discharge Communications   Discharge planning discussed with: pt, wife, [facility], [facility], [facility] (IP AUD tx referrals)   Freedom of Choice Yes   Comments - Freedom of Choice Pt chose IP tx at [facility], but was decline. Was referred and accepted to [facility] (2nd choice).   CM contacted family/caregiver? Yes   Were Treatment Team discharge recommendations reviewed with patient/caregiver? Yes   Did patient/caregiver verbalize understanding of patient care needs? Yes   Were patient/caregiver advised of the risks associated with not following Treatment Team discharge recommendations? Yes   Contacts   Patient Contacts Geraldine Wright (wife) [facility] (IP AUD tx referrals)   Relationship to Patient: Family;Treatment Provider   Contact Method Phone;Other (Comment)  (email)   Phone Number [phone], [phone], [phone], [phone]   Reason/Outcome Continuity of Care;Referral;Discharge Planning     CM was made aware by medical provider that pt was medically stable for discharge. Pt to discharge 6/27/25. Pt denies any withdrawal symptoms at this time. Pt to discharge to [facility] and wife will  upon discharge. Pt to follow up with [facility] on 6/27/25 and with PCP LUANA Mejía after completion of IP AUD tx. Pt came with his own supply of medications. New medications were filled and he left with supply.     Discharge Address:  (Facility)  [facility], [address] [place] [address] [address], [place] [address]  Phone Number: (Gszdehzx) [phone]    Pt's Home Address: [address] [place] [address] [address] [address]   Pt Phone: [phone] (Mobile)   [phone] (Home Phone)

## 2025-06-30 NOTE — TELEPHONE ENCOUNTER
Patient's wife called and is asking if provider would be able to provide a note.  Wife states that provider is aware of everything pt has been going through and wife needs note to provide to  and court for legal purposes. Wife is asking if the note can state that patient was in the ED on 6/23/25 for alcohol detox (per encounter it was the night of 6/22/25 to [facility]). On 6/23/25 pt was then transferred to [facility] to continue with the detox treatment and on 6/27/25 he was admitted to inpatient treatment at [facility].     Wife is asking if Note can be released through [employer] and be notified once its done so she is aware. Wife wants provider to know that once patient is release on the 1 st week of August they will be making a follow up appointment with him. Thank you
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
## 2025-07-08 ENCOUNTER — PATIENT OUTREACH (OUTPATIENT)
Dept: CASE MANAGEMENT | Facility: OTHER | Age: 50
End: 2025-07-08

## 2025-07-08 NOTE — PROGRESS NOTES
OP SWCM received a referral to contact patient to offer support regarding alcohol addiction treatment services.  Chart reviewed and call placed to patient regarding same.  Patient reports that he is currently receiving inpatient treatment at [facility] and will be discharged on July 25th.  Patient states he was napping and denies further needs at this time. Will close referral and remain available to provide support.

## 2025-07-24 ENCOUNTER — TELEPHONE (OUTPATIENT)
Age: 50
End: 2025-07-24

## 2025-07-24 NOTE — TELEPHONE ENCOUNTER
07/24/25    Patient called office today to schedule an appt with Therapy.    Asked patient what kind of therapy and Patient stated for “Alcohol”  Patient shared that he just came out of rehab.    While looking into patient chart to better assist call got disconnected.    Called patient back.  No answer.  Left message.    If patient contact office, please related that not documentation found of him needing to follow up with Neurology.    Advice on message to reach out to PCP for clarification and appropriate advice to what specialty he needs to see.    However, saw in patient chart that he is diabetic.    Please ask if he needs Neurological care.    Also, please apologies on my behalf of the disconnection of our call.   Thank You.    My Apologies for the Request or trouble.

## 2025-07-28 ENCOUNTER — TELEPHONE (OUTPATIENT)
Dept: FAMILY MEDICINE CLINIC | Facility: CLINIC | Age: 50
End: 2025-07-28

## 2025-07-29 ENCOUNTER — TELEPHONE (OUTPATIENT)
Dept: GASTROENTEROLOGY | Facility: CLINIC | Age: 50
End: 2025-07-29

## 2025-07-29 LAB
ATRIAL RATE: 80 BPM
P AXIS: 34 DEGREES
PR INTERVAL: 136 MS
QRS AXIS: 28 DEGREES
QRSD INTERVAL: 104 MS
QT INTERVAL: 388 MS
QTC INTERVAL: 448 MS
T WAVE AXIS: 32 DEGREES
VENTRICULAR RATE: 80 BPM

## 2025-07-30 ENCOUNTER — OFFICE VISIT (OUTPATIENT)
Dept: FAMILY MEDICINE CLINIC | Facility: CLINIC | Age: 50
End: 2025-07-30
Payer: COMMERCIAL

## 2025-07-30 VITALS
HEART RATE: 59 BPM | WEIGHT: 203 LBS | OXYGEN SATURATION: 98 % | DIASTOLIC BLOOD PRESSURE: 64 MMHG | SYSTOLIC BLOOD PRESSURE: 116 MMHG | BODY MASS INDEX: 28.31 KG/M2

## 2025-07-30 DIAGNOSIS — E11.65 CONTROLLED TYPE 2 DIABETES MELLITUS WITH HYPERGLYCEMIA, WITH LONG-TERM CURRENT USE OF INSULIN (HCC): ICD-10-CM

## 2025-07-30 DIAGNOSIS — E78.1 HYPERTRIGLYCERIDEMIA: ICD-10-CM

## 2025-07-30 DIAGNOSIS — D61.818 PANCYTOPENIA (HCC): ICD-10-CM

## 2025-07-30 DIAGNOSIS — K86.0 ALCOHOL-INDUCED CHRONIC PANCREATITIS (HCC): ICD-10-CM

## 2025-07-30 DIAGNOSIS — E78.2 MIXED HYPERLIPIDEMIA: ICD-10-CM

## 2025-07-30 DIAGNOSIS — K74.60 CIRRHOSIS OF LIVER WITHOUT ASCITES, UNSPECIFIED HEPATIC CIRRHOSIS TYPE (HCC): ICD-10-CM

## 2025-07-30 DIAGNOSIS — Z79.4 CONTROLLED TYPE 2 DIABETES MELLITUS WITH HYPERGLYCEMIA, WITH LONG-TERM CURRENT USE OF INSULIN (HCC): ICD-10-CM

## 2025-07-30 DIAGNOSIS — F41.9 ANXIETY: ICD-10-CM

## 2025-07-30 DIAGNOSIS — D72.829 LEUKOCYTOSIS, UNSPECIFIED TYPE: Primary | ICD-10-CM

## 2025-07-30 DIAGNOSIS — F10.20 ALCOHOL USE DISORDER, SEVERE, DEPENDENCE (HCC): ICD-10-CM

## 2025-07-30 DIAGNOSIS — K76.82 HEPATIC ENCEPHALOPATHY (HCC): Primary | ICD-10-CM

## 2025-07-30 PROCEDURE — 99215 OFFICE O/P EST HI 40 MIN: CPT | Performed by: FAMILY MEDICINE

## 2025-07-30 RX ORDER — GABAPENTIN 300 MG/1
300 CAPSULE ORAL
Qty: 30 CAPSULE | Refills: 5 | Status: SHIPPED | OUTPATIENT
Start: 2025-07-30 | End: 2025-08-29

## 2025-07-30 RX ORDER — BUSPIRONE HYDROCHLORIDE 5 MG/1
TABLET ORAL EVERY 12 HOURS
COMMUNITY
End: 2025-07-30 | Stop reason: SDUPTHER

## 2025-07-30 RX ORDER — BUSPIRONE HYDROCHLORIDE 5 MG/1
5 TABLET ORAL EVERY 12 HOURS
Qty: 60 TABLET | Refills: 3 | Status: SHIPPED | OUTPATIENT
Start: 2025-07-30

## 2025-07-30 RX ORDER — HYDROXYZINE HYDROCHLORIDE 50 MG/1
50 TABLET, FILM COATED ORAL EVERY 6 HOURS PRN
Qty: 30 TABLET | Refills: 0 | Status: SHIPPED | OUTPATIENT
Start: 2025-07-30 | End: 2025-08-07

## 2025-07-30 RX ORDER — INSULIN GLARGINE 100 [IU]/ML
12 INJECTION, SOLUTION SUBCUTANEOUS
COMMUNITY
Start: 2025-07-24

## 2025-08-01 ENCOUNTER — APPOINTMENT (OUTPATIENT)
Dept: LAB | Facility: HOSPITAL | Age: 50
End: 2025-08-01
Payer: COMMERCIAL

## 2025-08-01 ENCOUNTER — HOSPITAL ENCOUNTER (OUTPATIENT)
Dept: RADIOLOGY | Facility: HOSPITAL | Age: 50
Discharge: HOME | End: 2025-08-01
Payer: COMMERCIAL

## 2025-08-01 DIAGNOSIS — Z79.4 CONTROLLED TYPE 2 DIABETES MELLITUS WITH HYPERGLYCEMIA, WITH LONG-TERM CURRENT USE OF INSULIN (HCC): ICD-10-CM

## 2025-08-01 DIAGNOSIS — K86.0 ALCOHOL-INDUCED CHRONIC PANCREATITIS (HCC): ICD-10-CM

## 2025-08-01 DIAGNOSIS — D61.818 PANCYTOPENIA (HCC): ICD-10-CM

## 2025-08-01 DIAGNOSIS — E78.1 HYPERTRIGLYCERIDEMIA: ICD-10-CM

## 2025-08-01 DIAGNOSIS — K74.60 CIRRHOSIS OF LIVER WITHOUT ASCITES, UNSPECIFIED HEPATIC CIRRHOSIS TYPE (HCC): ICD-10-CM

## 2025-08-01 DIAGNOSIS — F10.20 ALCOHOL USE DISORDER, SEVERE, DEPENDENCE (HCC): ICD-10-CM

## 2025-08-01 DIAGNOSIS — E11.65 CONTROLLED TYPE 2 DIABETES MELLITUS WITH HYPERGLYCEMIA, WITH LONG-TERM CURRENT USE OF INSULIN (HCC): ICD-10-CM

## 2025-08-01 DIAGNOSIS — E78.2 MIXED HYPERLIPIDEMIA: ICD-10-CM

## 2025-08-01 DIAGNOSIS — D72.829 LEUKOCYTOSIS, UNSPECIFIED TYPE: ICD-10-CM

## 2025-08-01 DIAGNOSIS — S22.029A T2 VERTEBRAL FRACTURE (HCC): ICD-10-CM

## 2025-08-01 LAB
ALBUMIN SERPL BCG-MCNC: 3.9 G/DL (ref 3.5–5)
ALP SERPL-CCNC: 78 U/L (ref 34–104)
ALT SERPL W P-5'-P-CCNC: 12 U/L (ref 7–52)
AMMONIA PLAS-SCNC: 75 UMOL/L (ref 18–72)
ANION GAP SERPL CALCULATED.3IONS-SCNC: 7 MMOL/L (ref 4–13)
AST SERPL W P-5'-P-CCNC: 26 U/L (ref 13–39)
BILIRUB SERPL-MCNC: 1.42 MG/DL (ref 0.2–1)
BUN SERPL-MCNC: 13 MG/DL (ref 5–25)
CALCIUM SERPL-MCNC: 9.3 MG/DL (ref 8.4–10.2)
CHLORIDE SERPL-SCNC: 104 MMOL/L (ref 96–108)
CHOLEST SERPL-MCNC: 100 MG/DL (ref ?–200)
CO2 SERPL-SCNC: 25 MMOL/L (ref 21–32)
CREAT SERPL-MCNC: 0.54 MG/DL (ref 0.6–1.3)
ERYTHROCYTE [DISTWIDTH] IN BLOOD BY AUTOMATED COUNT: 24.1 % (ref 11.6–15.1)
EST. AVERAGE GLUCOSE BLD GHB EST-MCNC: 128 MG/DL
GFR SERPL CREATININE-BSD FRML MDRD: 120 ML/MIN/1.73SQ M
GLUCOSE P FAST SERPL-MCNC: 157 MG/DL (ref 65–99)
HBA1C MFR BLD: 6.1 %
HCT VFR BLD AUTO: 36.9 % (ref 36.5–49.3)
HDLC SERPL-MCNC: 33 MG/DL
HGB BLD-MCNC: 11.9 G/DL (ref 12–17)
LDLC SERPL CALC-MCNC: 37 MG/DL (ref 0–100)
MCH RBC QN AUTO: 28.3 PG (ref 26.8–34.3)
MCHC RBC AUTO-ENTMCNC: 32.2 G/DL (ref 31.4–37.4)
MCV RBC AUTO: 88 FL (ref 82–98)
PLATELET # BLD AUTO: 85 THOUSANDS/UL (ref 149–390)
PMV BLD AUTO: 10.3 FL (ref 8.9–12.7)
POTASSIUM SERPL-SCNC: 4.1 MMOL/L (ref 3.5–5.3)
PROT SERPL-MCNC: 6.7 G/DL (ref 6.4–8.4)
RBC # BLD AUTO: 4.21 MILLION/UL (ref 3.88–5.62)
SODIUM SERPL-SCNC: 136 MMOL/L (ref 135–147)
TRIGL SERPL-MCNC: 148 MG/DL (ref ?–150)
WBC # BLD AUTO: 3.82 THOUSAND/UL (ref 4.31–10.16)

## 2025-08-01 PROCEDURE — 85027 COMPLETE CBC AUTOMATED: CPT

## 2025-08-01 PROCEDURE — 83036 HEMOGLOBIN GLYCOSYLATED A1C: CPT

## 2025-08-01 PROCEDURE — 80061 LIPID PANEL: CPT

## 2025-08-01 PROCEDURE — 72072 X-RAY EXAM THORAC SPINE 3VWS: CPT

## 2025-08-01 PROCEDURE — 80321 ALCOHOLS BIOMARKERS 1OR 2: CPT

## 2025-08-01 PROCEDURE — 36415 COLL VENOUS BLD VENIPUNCTURE: CPT

## 2025-08-01 PROCEDURE — 82140 ASSAY OF AMMONIA: CPT

## 2025-08-01 PROCEDURE — 80053 COMPREHEN METABOLIC PANEL: CPT

## 2025-08-04 ENCOUNTER — NURSE TRIAGE (OUTPATIENT)
Age: 50
End: 2025-08-04

## 2025-08-04 ENCOUNTER — OFFICE VISIT (OUTPATIENT)
Dept: NEUROSURGERY | Facility: CLINIC | Age: 50
End: 2025-08-04
Payer: COMMERCIAL

## 2025-08-04 VITALS
OXYGEN SATURATION: 99 % | TEMPERATURE: 98 F | HEART RATE: 68 BPM | SYSTOLIC BLOOD PRESSURE: 90 MMHG | DIASTOLIC BLOOD PRESSURE: 60 MMHG

## 2025-08-04 DIAGNOSIS — S22.000A THORACIC COMPRESSION FRACTURE (HCC): Primary | ICD-10-CM

## 2025-08-04 PROCEDURE — 99214 OFFICE O/P EST MOD 30 MIN: CPT | Performed by: PHYSICIAN ASSISTANT

## 2025-08-05 ENCOUNTER — OFFICE VISIT (OUTPATIENT)
Dept: FAMILY MEDICINE CLINIC | Facility: CLINIC | Age: 50
End: 2025-08-05
Payer: COMMERCIAL

## 2025-08-05 VITALS
TEMPERATURE: 98 F | HEIGHT: 70 IN | WEIGHT: 203 LBS | HEART RATE: 65 BPM | RESPIRATION RATE: 16 BRPM | DIASTOLIC BLOOD PRESSURE: 52 MMHG | SYSTOLIC BLOOD PRESSURE: 88 MMHG | BODY MASS INDEX: 29.06 KG/M2 | OXYGEN SATURATION: 95 %

## 2025-08-05 DIAGNOSIS — Z79.4 CONTROLLED TYPE 2 DIABETES MELLITUS WITH HYPERGLYCEMIA, WITH LONG-TERM CURRENT USE OF INSULIN (HCC): ICD-10-CM

## 2025-08-05 DIAGNOSIS — F10.20 ALCOHOL USE DISORDER, SEVERE, DEPENDENCE (HCC): ICD-10-CM

## 2025-08-05 DIAGNOSIS — E11.65 CONTROLLED TYPE 2 DIABETES MELLITUS WITH HYPERGLYCEMIA, WITH LONG-TERM CURRENT USE OF INSULIN (HCC): ICD-10-CM

## 2025-08-05 DIAGNOSIS — K74.60 CIRRHOSIS OF LIVER WITHOUT ASCITES, UNSPECIFIED HEPATIC CIRRHOSIS TYPE (HCC): Primary | ICD-10-CM

## 2025-08-05 DIAGNOSIS — J45.909 ASTHMA DUE TO SEASONAL ALLERGIES: ICD-10-CM

## 2025-08-05 DIAGNOSIS — I10 PRIMARY HYPERTENSION: ICD-10-CM

## 2025-08-05 PROCEDURE — 99214 OFFICE O/P EST MOD 30 MIN: CPT | Performed by: FAMILY MEDICINE

## 2025-08-05 PROCEDURE — 93000 ELECTROCARDIOGRAM COMPLETE: CPT | Performed by: FAMILY MEDICINE

## 2025-08-06 VITALS — WEIGHT: 197.8 LBS | HEIGHT: 70 IN | BODY MASS INDEX: 28.32 KG/M2

## 2025-08-06 DIAGNOSIS — S22.050A COMPRESSION FRACTURE OF T5 VERTEBRA, INITIAL ENCOUNTER (HCC): ICD-10-CM

## 2025-08-06 DIAGNOSIS — S22.040A COMPRESSION FRACTURE OF T4 VERTEBRA, INITIAL ENCOUNTER (HCC): Primary | ICD-10-CM

## 2025-08-06 PROCEDURE — 99214 OFFICE O/P EST MOD 30 MIN: CPT | Performed by: ORTHOPAEDIC SURGERY

## 2025-08-09 ENCOUNTER — APPOINTMENT (EMERGENCY)
Dept: RADIOLOGY | Facility: HOSPITAL | Age: 50
End: 2025-08-09
Payer: COMMERCIAL

## 2025-08-09 ENCOUNTER — HOSPITAL ENCOUNTER (EMERGENCY)
Facility: HOSPITAL | Age: 50
Discharge: HOME | End: 2025-08-10
Payer: COMMERCIAL

## 2025-08-09 ENCOUNTER — APPOINTMENT (EMERGENCY)
Dept: CT IMAGING | Facility: HOSPITAL | Age: 50
End: 2025-08-09
Payer: COMMERCIAL

## 2025-08-12 LAB
PETH BLD QL SCN: POSITIVE
PETH BLD-MCNC: 173 NG/ML

## 2025-08-19 ENCOUNTER — OFFICE VISIT (OUTPATIENT)
Dept: FAMILY MEDICINE CLINIC | Facility: CLINIC | Age: 50
End: 2025-08-19
Payer: COMMERCIAL

## 2025-08-19 VITALS
SYSTOLIC BLOOD PRESSURE: 144 MMHG | HEIGHT: 70 IN | HEART RATE: 81 BPM | TEMPERATURE: 98.2 F | BODY MASS INDEX: 28.2 KG/M2 | WEIGHT: 197 LBS | OXYGEN SATURATION: 99 % | DIASTOLIC BLOOD PRESSURE: 90 MMHG

## 2025-08-19 DIAGNOSIS — I10 PRIMARY HYPERTENSION: Primary | ICD-10-CM

## 2025-08-19 DIAGNOSIS — E78.2 MIXED HYPERLIPIDEMIA: ICD-10-CM

## 2025-08-19 DIAGNOSIS — F10.20 ALCOHOL USE DISORDER, SEVERE, DEPENDENCE (HCC): ICD-10-CM

## 2025-08-19 PROCEDURE — 99214 OFFICE O/P EST MOD 30 MIN: CPT | Performed by: FAMILY MEDICINE

## 2025-08-19 RX ORDER — ROSUVASTATIN CALCIUM 40 MG/1
40 TABLET, COATED ORAL DAILY
Qty: 90 TABLET | Refills: 0 | Status: SHIPPED | OUTPATIENT
Start: 2025-08-19 | End: 2025-08-19 | Stop reason: SDUPTHER

## 2025-08-19 RX ORDER — LISINOPRIL 5 MG/1
5 TABLET ORAL DAILY
Qty: 100 TABLET | Refills: 3 | Status: SHIPPED | OUTPATIENT
Start: 2025-08-19

## 2025-08-19 RX ORDER — ROSUVASTATIN CALCIUM 40 MG/1
40 TABLET, COATED ORAL DAILY
Qty: 90 TABLET | Refills: 0 | Status: SHIPPED | OUTPATIENT
Start: 2025-08-19 | End: 2025-11-17

## (undated) DEVICE — BLOCK TRAY UNIVERSAL

## (undated) DEVICE — SYRINGE 1ML TB 26G X 3/8 SAFETY

## (undated) DEVICE — 1820 FOAM BLOCK NEEDLE COUNTER: Brand: DEVON

## (undated) DEVICE — CHOLE CATH KIT ARROW

## (undated) DEVICE — PLASTIC ADHESIVE BANDAGE: Brand: CURITY

## (undated) DEVICE — DRAPE C-ARM X-RAY

## (undated) DEVICE — DRAPE TOWEL: Brand: CONVERTORS

## (undated) DEVICE — ALLENTOWN LAP CHOLE APP PACK: Brand: CARDINAL HEALTH

## (undated) DEVICE — PENCIL ELECTROSURG E-Z CLEAN -0035H

## (undated) DEVICE — Device: Brand: MEDEX

## (undated) DEVICE — TUBING ARTHROSCOPIC WAVE  MAIN PUMP

## (undated) DEVICE — SYRINGE 5ML LL

## (undated) DEVICE — MAYO STAND COVER: Brand: CONVERTORS

## (undated) DEVICE — SPECIMEN CONTAINER STERILE PEEL PACK

## (undated) DEVICE — GLOVE INDICATOR PI UNDERGLOVE SZ 8 BLUE

## (undated) DEVICE — ENDOPATH XCEL BLADELESS TROCARS WITH STABILITY SLEEVES: Brand: ENDOPATH XCEL

## (undated) DEVICE — SYRINGE 10ML LL

## (undated) DEVICE — UTILITY MARKER,BLACK WITH LABELS: Brand: DEVON

## (undated) DEVICE — OCCLUSIVE GAUZE STRIP,3% BISMUTH TRIBROMOPHENATE IN PETROLATUM BLEND: Brand: XEROFORM

## (undated) DEVICE — NEEDLE BLUNT 18 G X 1 1/2IN

## (undated) DEVICE — DRAPE SHEET THREE QUARTER

## (undated) DEVICE — NEEDLE 25G X 1 1/2

## (undated) DEVICE — GAUZE SPONGES,8 PLY: Brand: CURITY

## (undated) DEVICE — SUT PDS II 0 CT-1 27 IN Z340H

## (undated) DEVICE — U-DRAPE: Brand: CONVERTORS

## (undated) DEVICE — 3M™ STERI-STRIP™ REINFORCED ADHESIVE SKIN CLOSURES, R1546, 1/4 IN X 4 IN (6 MM X 100 MM), 10 STRIPS/ENVELOPE: Brand: 3M™ STERI-STRIP™

## (undated) DEVICE — GLOVE INDICATOR PI UNDERGLOVE SZ 7 BLUE

## (undated) DEVICE — 4-PORT MANIFOLD: Brand: NEPTUNE 2

## (undated) DEVICE — ENDOPATH XCEL UNIVERSAL TROCAR STABLILITY SLEEVES: Brand: ENDOPATH XCEL

## (undated) DEVICE — GLOVE INDICATOR PI UNDERGLOVE SZ 7.5 BLUE

## (undated) DEVICE — BETHLEHEM UNIVERSAL  ARTHRO PK: Brand: CARDINAL HEALTH

## (undated) DEVICE — 3M™ STERI-STRIP™ REINFORCED ADHESIVE SKIN CLOSURES, R1542, 1/4 IN X 1-1/2 IN (6 MM X 38 MM), 6 STRIPS/ENVELOPE: Brand: 3M™ STERI-STRIP™

## (undated) DEVICE — INTENDED FOR TISSUE SEPARATION, AND OTHER PROCEDURES THAT REQUIRE A SHARP SURGICAL BLADE TO PUNCTURE OR CUT.: Brand: BARD-PARKER SAFETY BLADES SIZE 15, STERILE

## (undated) DEVICE — IV SET EXT SM BORE CARESITE 8IN

## (undated) DEVICE — SHOULDER SUSPENSION KIT 6 PER BOX

## (undated) DEVICE — COTTON TIP APPLICTOR 2 PK

## (undated) DEVICE — ASTOUND STANDARD SURGICAL GOWN, XL: Brand: CONVERTORS

## (undated) DEVICE — 3M™ MICROFOAM™ SURGICAL TAPE 4 ROLLS/CARTON 6 CARTONS/CASE 1528-3: Brand: 3M™ MICROFOAM™

## (undated) DEVICE — IRRIG ENDO FLO TUBING

## (undated) DEVICE — SUT ETHILON 3-0 PS-1 18 IN 1663H

## (undated) DEVICE — LIGHT HANDLE COVER SLEEVE DISP BLUE STELLAR

## (undated) DEVICE — NEEDLE SPINAL 22G X 5IN QUINCKE

## (undated) DEVICE — SUT VLOC 180  0 8IN  VLOCA008L

## (undated) DEVICE — CHLORAPREP HI-LITE 10.5ML ORANGE

## (undated) DEVICE — THREADED CLEAR CANNULA WITH OBTURATOR 8.5MM X 75MM

## (undated) DEVICE — ELECTRODE LAP SPATULA CRV E-Z CLEAN 33CM -0018C

## (undated) DEVICE — DRAPE EQUIPMENT RF WAND

## (undated) DEVICE — SUT VICRYL 4-0 PS-2 27 IN J426H

## (undated) DEVICE — SCD SEQUENTIAL COMPRESSION COMFORT SLEEVE MEDIUM KNEE LENGTH: Brand: KENDALL SCD

## (undated) DEVICE — PROBE ABLATION  APOLLO RF 90 DEG MULTI PORT

## (undated) DEVICE — ENDOPOUCH RETRIEVER SPECIMEN RETRIEVAL BAGS: Brand: ENDOPOUCH RETRIEVER

## (undated) DEVICE — TUBING SUCTION 5MM X 12 FT

## (undated) DEVICE — LIGHT HANDLE COVER CAMERA DISP

## (undated) DEVICE — FLEXIBLE ADHESIVE BANDAGE,X-LARGE: Brand: CURITY

## (undated) DEVICE — CANNULA ARTHRO LOPRFL 5MM X 7CM

## (undated) DEVICE — GAUZE SPONGES,16 PLY: Brand: CURITY

## (undated) DEVICE — SUT VICRYL 0 UR-6 27 IN J603H

## (undated) DEVICE — BANDAID WATERPROOF 1-7/8 X 4

## (undated) DEVICE — [HIGH FLOW INSUFFLATOR,  DO NOT USE IF PACKAGE IS DAMAGED,  KEEP DRY,  KEEP AWAY FROM SUNLIGHT,  PROTECT FROM HEAT AND RADIOACTIVE SOURCES.]: Brand: PNEUMOSURE

## (undated) DEVICE — TUBING SMOKE EVAC W/FILTRATION DEVICE PLUMEPORT ACTIV

## (undated) DEVICE — GLOVE SRG BIOGEL ECLIPSE 7.5

## (undated) DEVICE — SPONGE STICK WITH PVP-I: Brand: KENDALL

## (undated) DEVICE — BLADE SHAVER DISSECTOR 4MM 13CM COOLCUT

## (undated) DEVICE — MINOR PROCEDURE DRAPE: Brand: CONVERTORS

## (undated) DEVICE — SUT MONOCRYL 4-0 PS-2 18 IN Y496G

## (undated) DEVICE — GLOVE SRG BIOGEL ORTHOPEDIC 7

## (undated) DEVICE — GAUZE SPONGES,USP TYPE VII GAUZE, 12 PLY: Brand: CURITY

## (undated) DEVICE — ENDO STITCH DEVICE 10 MM

## (undated) DEVICE — GLOVE SRG BIOGEL 7

## (undated) DEVICE — PAD GROUNDING ADULT

## (undated) DEVICE — MEDI-VAC TUBING CONNECTOR 6-IN-1 STRAIGHT: Brand: CARDINAL HEALTH

## (undated) DEVICE — 3M™ STERI-STRIP™ REINFORCED ADHESIVE SKIN CLOSURES, R1547, 1/2 IN X 4 IN (12 MM X 100 MM), 6 STRIPS/ENVELOPE: Brand: 3M™ STERI-STRIP™

## (undated) DEVICE — Device

## (undated) DEVICE — 3M™ TEGADERM™ TRANSPARENT FILM DRESSING FRAME STYLE, 1624W, 2-3/8 IN X 2-3/4 IN (6 CM X 7 CM), 100/CT 4CT/CASE: Brand: 3M™ TEGADERM™

## (undated) DEVICE — ABDOMINAL PAD: Brand: DERMACEA

## (undated) DEVICE — AEM CORD

## (undated) DEVICE — LIGAMAX 5 MM ENDOSCOPIC MULTIPLE CLIP APPLIER: Brand: LIGAMAX

## (undated) DEVICE — REM POLYHESIVE ADULT PATIENT RETURN ELECTRODE: Brand: VALLEYLAB

## (undated) DEVICE — CHLORAPREP HI-LITE 26ML ORANGE

## (undated) DEVICE — TROCAR: Brand: KII FIOS FIRST ENTRY

## (undated) DEVICE — DRAPE C-ARMOUR

## (undated) DEVICE — IV CATH 14 G X 1.75

## (undated) DEVICE — INTENDED FOR TISSUE SEPARATION, AND OTHER PROCEDURES THAT REQUIRE A SHARP SURGICAL BLADE TO PUNCTURE OR CUT.: Brand: BARD-PARKER ® CARBON RIB-BACK BLADES